# Patient Record
Sex: FEMALE | Race: WHITE | NOT HISPANIC OR LATINO | Employment: OTHER | URBAN - NONMETROPOLITAN AREA
[De-identification: names, ages, dates, MRNs, and addresses within clinical notes are randomized per-mention and may not be internally consistent; named-entity substitution may affect disease eponyms.]

---

## 2020-06-01 ENCOUNTER — HISTORICAL (OUTPATIENT)
Dept: ADMINISTRATIVE | Facility: HOSPITAL | Age: 70
End: 2020-06-01

## 2020-07-08 ENCOUNTER — HISTORICAL (OUTPATIENT)
Dept: ADMINISTRATIVE | Facility: HOSPITAL | Age: 70
End: 2020-07-08

## 2020-07-08 LAB
APPEARANCE, UA: CLEAR
BACTERIA SPEC CULT: NORMAL /HPF
BILIRUB UR QL STRIP: NORMAL MG/DL
BUDDING YEAST: NORMAL /HPF
CASTS, URINE MICROSCOPIC: 28 /LPF
COLOR UR: NORMAL
EPITHELIAL, URINE MICROSCOPIC: NEGATIVE /HPF
GLUCOSE (UA): NEGATIVE MG/DL
HGB UR QL STRIP: NEGATIVE ERY/UL
KETONES UR QL STRIP: 15 MG/DL
LEUKOCYTE ESTERASE UR QL STRIP: NORMAL LEU/UL
NITRITE UR QL STRIP: POSITIVE MG/DL
PH UR STRIP: 6 [PH] (ref 5–7.5)
PROT UR QL STRIP: 30 MG/DL
RBC #/AREA URNS HPF: NEGATIVE /HPF
SP GR UR STRIP: 1.03 (ref 1–1.03)
SPERM, URINE MICROSCOPIC: NORMAL /HPF
TYPE OF SPECIMEN  (UA): NORMAL
UNCLASSIFIED CRYSTALS, UA: NORMAL /HPF
UROBILINOGEN UR STRIP-ACNC: 1 EU/L
WBC #/AREA URNS HPF: 32 /HPF

## 2020-07-08 PROCEDURE — 87077 CULTURE AEROBIC IDENTIFY: CPT

## 2020-07-08 PROCEDURE — 87186 SC STD MICRODIL/AGAR DIL: CPT

## 2020-07-08 PROCEDURE — 99283 EMERGENCY DEPT VISIT LOW MDM: CPT

## 2020-07-08 PROCEDURE — 99990 CHARGE CONVERSION: CPT

## 2020-07-08 PROCEDURE — 81001 URINALYSIS AUTO W/SCOPE: CPT

## 2020-07-08 PROCEDURE — 87086 URINE CULTURE/COLONY COUNT: CPT

## 2020-07-10 ENCOUNTER — HISTORICAL (OUTPATIENT)
Dept: ADMINISTRATIVE | Facility: HOSPITAL | Age: 70
End: 2020-07-10

## 2020-07-10 ENCOUNTER — HOSPITAL ENCOUNTER (INPATIENT)
Facility: HOSPITAL | Age: 70
LOS: 20 days | Discharge: NURSING, REHAB OR LONG-TERM CARE | DRG: 871 | End: 2020-07-30
Attending: INTERNAL MEDICINE | Admitting: INTERNAL MEDICINE
Payer: MEDICARE

## 2020-07-10 LAB
ALBUMIN SERPL BCP-MCNC: 2.8 G/DL (ref 3.5–5)
ALBUMIN/GLOB SERPL ELPH: 0.6 {RATIO} (ref 1.5–2.2)
ALP SERPL-CCNC: 102 U/L (ref 45–117)
ALT SERPL W P-5'-P-CCNC: 28 U/L (ref 13–56)
ANION GAP SERPL CALC-SCNC: 12 MEQ/L (ref 10–20)
APPEARANCE, UA: CLEAR
AST SERPL-CCNC: 36 U/L (ref 15–37)
BACTERIA SPEC CULT: NEGATIVE /HPF
BASOPHILS NFR BLD: 0 10 (ref 0–0.1)
BASOPHILS NFR BLD: 0.1 % (ref 0–1.5)
BILIRUB SERPL-MCNC: 1.02 MG/DL (ref 0.2–1)
BILIRUB UR QL STRIP: NEGATIVE MG/DL
BNP SERPL-MCNC: 1535 PG/ML (ref 0–352)
BUDDING YEAST: ABNORMAL /HPF
BUN SERPL-MCNC: 22 MG/DL (ref 7–18)
CALCIUM SERPL-MCNC: 9.7 MG/DL (ref 8.5–10.1)
CASTS, URINE MICROSCOPIC: NEGATIVE /LPF
CHLORIDE SERPL-SCNC: 96 MMOL/L (ref 98–107)
CO2 SERPL-SCNC: 29 MMOL/L (ref 22–32)
COLOR UR: YELLOW
COVID-19 INTERNAL CONTROL: NORMAL
CREAT SERPL-MCNC: 1.25 MG/DL (ref 0.55–1.02)
D DIMER PPP IA.FEU-MCNC: 3.83 MG/L (ref 0–0.5)
EGFR: 45 ML/MIN/1.73M
EOSINOPHIL NFR BLD: 0 % (ref 0–7)
EOSINOPHIL NFR BLD: 0 10 (ref 0–0.7)
EPITHELIAL, URINE MICROSCOPIC: NEGATIVE /HPF
ERYTHROCYTE [DISTWIDTH] IN BLOOD BY AUTOMATED COUNT: 15.5 % (ref 11.5–14.5)
GLOBULIN: 4.5 G/DL (ref 2.3–3.5)
GLUCOSE (UA): NEGATIVE MG/DL
GLUCOSE SERPL-MCNC: 141 MG/DL (ref 70–99)
GRAN #: 22.07 10 (ref 2–7.5)
GRAN%: 0.9 %
GRAN%: 80 % (ref 50–80)
HCT VFR BLD AUTO: 42.8 % (ref 37.7–47.9)
HGB BLD-MCNC: 13.7 G/DL (ref 12.2–16.2)
HGB UR QL STRIP: ABNORMAL ERY/UL
IMMATURE GRANULOCYTES #: 0.26 10
KETONES UR QL STRIP: NEGATIVE MG/DL
LACTATE SERPL-SCNC: 1.56 MMOL/L (ref 0.4–2)
LACTATE SERPL-SCNC: 2.32 MMOL/L (ref 0.4–2)
LEUKOCYTE ESTERASE UR QL STRIP: NEGATIVE LEU/UL
LIPASE SERPL-CCNC: 33 U/L (ref 73–393)
LYMPH #: 1.4 10 (ref 1–3.5)
LYMPH%: 5 % (ref 12–50)
MAGNESIUM SERPL-MCNC: 1.9 MG/DL (ref 1.8–2.4)
MCH RBC QN AUTO: 29.1 PG (ref 27–31)
MCHC RBC AUTO-ENTMCNC: 32 G% (ref 32–35)
MCV RBC AUTO: 90.9 FL (ref 80–97)
MONO #: 3.9 10 (ref 0–0.8)
MONO%: 14 % (ref 0–12)
NITRITE UR QL STRIP: NEGATIVE MG/DL
OSMOC: 272 MOSM/KG (ref 275–295)
PH UR STRIP: 5 [PH] (ref 5–7.5)
PMV BLD AUTO: 235 10 (ref 142–424)
PMV BLD AUTO: 9.8 FL (ref 7.4–10.4)
POTASSIUM SERPL-SCNC: 4 MMOL/L (ref 3.5–5.1)
PROT SERPL-MCNC: 7.3 G/DL (ref 6.4–8.2)
PROT UR QL STRIP: 100 MG/DL
RBC # BLD AUTO: 4.71 M/UL (ref 4.04–5.48)
RBC #/AREA URNS HPF: NEGATIVE /HPF
SARS-COV-2 RNA RESP QL NAA+PROBE: NOT DETECTED
SODIUM BLD-SCNC: 133 MMOL/L (ref 136–145)
SP GR UR STRIP: >1.045 (ref 1–1.03)
SPERM, URINE MICROSCOPIC: ABNORMAL /HPF
TROPONIN I SERPL DL<=0.01 NG/ML-MCNC: <0.02 NG/ML (ref 0–0.05)
TYPE OF SPECIMEN  (UA): ABNORMAL
UNCLASSIFIED CRYSTALS, UA: ABNORMAL /HPF
URINE CULTURE, ROUTINE: NORMAL
UROBILINOGEN UR STRIP-ACNC: 1 EU/L
WBC # BLD AUTO: 27.6 10 (ref 4–10.2)
WBC #/AREA URNS HPF: NEGATIVE /HPF

## 2020-07-10 PROCEDURE — 81001 URINALYSIS AUTO W/SCOPE: CPT

## 2020-07-10 PROCEDURE — 80053 COMPREHEN METABOLIC PANEL: CPT

## 2020-07-10 PROCEDURE — 85025 COMPLETE CBC W/AUTO DIFF WBC: CPT

## 2020-07-10 PROCEDURE — 87077 CULTURE AEROBIC IDENTIFY: CPT

## 2020-07-10 PROCEDURE — 85379 FIBRIN DEGRADATION QUANT: CPT

## 2020-07-10 PROCEDURE — 83880 ASSAY OF NATRIURETIC PEPTIDE: CPT

## 2020-07-10 PROCEDURE — 87186 SC STD MICRODIL/AGAR DIL: CPT

## 2020-07-10 PROCEDURE — 94640 AIRWAY INHALATION TREATMENT: CPT

## 2020-07-10 PROCEDURE — 87040 BLOOD CULTURE FOR BACTERIA: CPT

## 2020-07-10 PROCEDURE — 83605 ASSAY OF LACTIC ACID: CPT

## 2020-07-10 PROCEDURE — P9612 CATHETERIZE FOR URINE SPEC: HCPCS

## 2020-07-10 PROCEDURE — 83735 ASSAY OF MAGNESIUM: CPT

## 2020-07-10 PROCEDURE — 71275 CT ANGIOGRAPHY CHEST: CPT

## 2020-07-10 PROCEDURE — 99285 EMERGENCY DEPT VISIT HI MDM: CPT | Mod: 25

## 2020-07-10 PROCEDURE — 84484 ASSAY OF TROPONIN QUANT: CPT

## 2020-07-10 PROCEDURE — 93005 ELECTROCARDIOGRAM TRACING: CPT

## 2020-07-10 PROCEDURE — 71045 X-RAY EXAM CHEST 1 VIEW: CPT

## 2020-07-10 PROCEDURE — 99990 CHARGE CONVERSION: CPT | Mod: 25

## 2020-07-10 PROCEDURE — 83690 ASSAY OF LIPASE: CPT

## 2020-07-11 LAB
ANION GAP SERPL CALC-SCNC: 7.6 MEQ/L (ref 10–20)
BASOPHILS NFR BLD: 0 10 (ref 0–0.1)
BASOPHILS NFR BLD: 0.1 % (ref 0–1.5)
BUN SERPL-MCNC: 20 MG/DL (ref 7–18)
CALCIUM SERPL-MCNC: 8.6 MG/DL (ref 8.5–10.1)
CHLORIDE SERPL-SCNC: 98 MMOL/L (ref 98–107)
CO2 SERPL-SCNC: 30 MMOL/L (ref 22–32)
CREAT SERPL-MCNC: 1.08 MG/DL (ref 0.55–1.02)
EGFR: 53 ML/MIN/1.73M
EOSINOPHIL NFR BLD: 0 % (ref 0–7)
EOSINOPHIL NFR BLD: 0 10 (ref 0–0.7)
ERYTHROCYTE [DISTWIDTH] IN BLOOD BY AUTOMATED COUNT: 15.5 % (ref 11.5–14.5)
GLUCOSE SERPL-MCNC: 136 MG/DL (ref 70–99)
GRAN #: 20.04 10 (ref 2–7.5)
GRAN%: 1.1 %
GRAN%: 80.9 % (ref 50–80)
HCT VFR BLD AUTO: 38.9 % (ref 37.7–47.9)
HGB BLD-MCNC: 12.6 G/DL (ref 12.2–16.2)
IMMATURE GRANULOCYTES #: 0.26 10
LACTATE SERPL-SCNC: 0.81 MMOL/L (ref 0.4–2)
LYMPH #: 1 10 (ref 1–3.5)
LYMPH%: 4.2 % (ref 12–50)
MAGNESIUM SERPL-MCNC: 2.37 MG/DL (ref 1.8–2.4)
MCH RBC QN AUTO: 29.3 PG (ref 27–31)
MCHC RBC AUTO-ENTMCNC: 32.4 G% (ref 32–35)
MCV RBC AUTO: 90.5 FL (ref 80–97)
MONO #: 3.4 10 (ref 0–0.8)
MONO%: 13.7 % (ref 0–12)
OSMOC: 269 MOSM/KG (ref 275–295)
PMV BLD AUTO: 10 FL (ref 7.4–10.4)
PMV BLD AUTO: 228 10 (ref 142–424)
POTASSIUM SERPL-SCNC: 3.6 MMOL/L (ref 3.5–5.1)
RBC # BLD AUTO: 4.3 M/UL (ref 4.04–5.48)
SODIUM BLD-SCNC: 132 MMOL/L (ref 136–145)
WBC # BLD AUTO: 24.8 10 (ref 4–10.2)

## 2020-07-11 PROCEDURE — 80048 BASIC METABOLIC PNL TOTAL CA: CPT

## 2020-07-11 PROCEDURE — 87205 SMEAR GRAM STAIN: CPT

## 2020-07-11 PROCEDURE — 94640 AIRWAY INHALATION TREATMENT: CPT | Mod: 76

## 2020-07-11 PROCEDURE — 83605 ASSAY OF LACTIC ACID: CPT

## 2020-07-11 PROCEDURE — 99990 CHARGE CONVERSION: CPT | Mod: 76

## 2020-07-11 PROCEDURE — 83735 ASSAY OF MAGNESIUM: CPT

## 2020-07-11 PROCEDURE — 85025 COMPLETE CBC W/AUTO DIFF WBC: CPT

## 2020-07-12 LAB
ALBUMIN SERPL BCP-MCNC: 2.3 G/DL (ref 3.5–5)
ALBUMIN/GLOB SERPL ELPH: 0.5 {RATIO} (ref 1.5–2.2)
ALP SERPL-CCNC: 142 U/L (ref 45–117)
ALT SERPL W P-5'-P-CCNC: 32 U/L (ref 13–56)
ANION GAP SERPL CALC-SCNC: 12.4 MEQ/L (ref 10–20)
AST SERPL-CCNC: 35 U/L (ref 15–37)
BASOPHILS NFR BLD: 0 10 (ref 0–0.1)
BASOPHILS NFR BLD: 0.1 % (ref 0–1.5)
BILIRUB SERPL-MCNC: 0.52 MG/DL (ref 0.2–1)
BUN SERPL-MCNC: 27 MG/DL (ref 7–18)
CALCIUM SERPL-MCNC: 9.3 MG/DL (ref 8.5–10.1)
CHLORIDE SERPL-SCNC: 91 MMOL/L (ref 98–107)
CO2 SERPL-SCNC: 28 MMOL/L (ref 22–32)
CREAT SERPL-MCNC: 1.28 MG/DL (ref 0.55–1.02)
EGFR: 44 ML/MIN/1.73M
EOSINOPHIL NFR BLD: 0 % (ref 0–7)
EOSINOPHIL NFR BLD: 0 10 (ref 0–0.7)
ERYTHROCYTE [DISTWIDTH] IN BLOOD BY AUTOMATED COUNT: 15.3 % (ref 11.5–14.5)
GLOBULIN: 4.6 G/DL (ref 2.3–3.5)
GLUCOSE SERPL-MCNC: 111 MG/DL (ref 70–99)
GRAN #: 16.77 10 (ref 2–7.5)
GRAN%: 0.7 %
GRAN%: 78.5 % (ref 50–80)
HCT VFR BLD AUTO: 35.8 % (ref 37.7–47.9)
HGB BLD-MCNC: 11.8 G/DL (ref 12.2–16.2)
IMMATURE GRANULOCYTES #: 0.15 10
LYMPH #: 1.1 10 (ref 1–3.5)
LYMPH%: 4.9 % (ref 12–50)
MAGNESIUM SERPL-MCNC: 2.35 MG/DL (ref 1.8–2.4)
MCH RBC QN AUTO: 29.6 PG (ref 27–31)
MCHC RBC AUTO-ENTMCNC: 33 G% (ref 32–35)
MCV RBC AUTO: 89.9 FL (ref 80–97)
MONO #: 3.4 10 (ref 0–0.8)
MONO%: 15.8 % (ref 0–12)
OSMOC: 263 MOSM/KG (ref 275–295)
PMV BLD AUTO: 10.6 FL (ref 7.4–10.4)
PMV BLD AUTO: 225 10 (ref 142–424)
POTASSIUM SERPL-SCNC: 3.4 MMOL/L (ref 3.5–5.1)
PROT SERPL-MCNC: 6.9 G/DL (ref 6.4–8.2)
RBC # BLD AUTO: 3.98 M/UL (ref 4.04–5.48)
SODIUM BLD-SCNC: 128 MMOL/L (ref 136–145)
WBC # BLD AUTO: 21.4 10 (ref 4–10.2)

## 2020-07-12 PROCEDURE — 85025 COMPLETE CBC W/AUTO DIFF WBC: CPT

## 2020-07-12 PROCEDURE — C1751 CATH, INF, PER/CENT/MIDLINE: HCPCS

## 2020-07-12 PROCEDURE — 83735 ASSAY OF MAGNESIUM: CPT

## 2020-07-12 PROCEDURE — 80053 COMPREHEN METABOLIC PANEL: CPT

## 2020-07-12 PROCEDURE — 94640 AIRWAY INHALATION TREATMENT: CPT | Mod: 76

## 2020-07-12 PROCEDURE — 99990 CHARGE CONVERSION: CPT

## 2020-07-13 ENCOUNTER — HISTORICAL (OUTPATIENT)
Dept: ADMINISTRATIVE | Facility: HOSPITAL | Age: 70
End: 2020-07-13

## 2020-07-13 LAB
ALBUMIN SERPL BCP-MCNC: 2.1 G/DL (ref 3.5–5)
ALBUMIN/GLOB SERPL ELPH: 0.4 {RATIO} (ref 1.5–2.2)
ALP SERPL-CCNC: 117 U/L (ref 45–117)
ALT SERPL W P-5'-P-CCNC: 42 U/L (ref 13–56)
ANION GAP SERPL CALC-SCNC: 10.3 MEQ/L (ref 10–20)
AST SERPL-CCNC: 42 U/L (ref 15–37)
BACTERIA BLD CULT: NORMAL
BASOPHILS NFR BLD: 0 10 (ref 0–0.1)
BASOPHILS NFR BLD: 0.2 % (ref 0–1.5)
BILIRUB SERPL-MCNC: 0.58 MG/DL (ref 0.2–1)
BUN SERPL-MCNC: 23 MG/DL (ref 7–18)
CALCIUM SERPL-MCNC: 8.9 MG/DL (ref 8.5–10.1)
CHLORIDE SERPL-SCNC: 90 MMOL/L (ref 98–107)
CO2 SERPL-SCNC: 29 MMOL/L (ref 22–32)
CREAT SERPL-MCNC: 1.18 MG/DL (ref 0.55–1.02)
EGFR: 48 ML/MIN/1.73M
EOSINOPHIL NFR BLD: 0 10 (ref 0–0.7)
EOSINOPHIL NFR BLD: 0.2 % (ref 0–7)
ERYTHROCYTE [DISTWIDTH] IN BLOOD BY AUTOMATED COUNT: 15.2 % (ref 11.5–14.5)
GLOBULIN: 5 G/DL (ref 2.3–3.5)
GLUCOSE SERPL-MCNC: 109 MG/DL (ref 70–99)
GRAN #: 14.77 10 (ref 2–7.5)
GRAN%: 1.1 %
GRAN%: 74.9 % (ref 50–80)
HCT VFR BLD AUTO: 37.9 % (ref 37.7–47.9)
HGB BLD-MCNC: 12.6 G/DL (ref 12.2–16.2)
IMMATURE GRANULOCYTES #: 0.22 10
LYMPH #: 1.4 10 (ref 1–3.5)
LYMPH%: 7 % (ref 12–50)
MAGNESIUM SERPL-MCNC: 2.28 MG/DL (ref 1.8–2.4)
MCH RBC QN AUTO: 29.3 PG (ref 27–31)
MCHC RBC AUTO-ENTMCNC: 33.2 G% (ref 32–35)
MCV RBC AUTO: 88.1 FL (ref 80–97)
MONO #: 3.3 10 (ref 0–0.8)
MONO%: 16.6 % (ref 0–12)
OSMOC: 258 MOSM/KG (ref 275–295)
PMV BLD AUTO: 10.5 FL (ref 7.4–10.4)
PMV BLD AUTO: 242 10 (ref 142–424)
POTASSIUM SERPL-SCNC: 3.3 MMOL/L (ref 3.5–5.1)
PROT SERPL-MCNC: 7.1 G/DL (ref 6.4–8.2)
RBC # BLD AUTO: 4.3 M/UL (ref 4.04–5.48)
SODIUM BLD-SCNC: 126 MMOL/L (ref 136–145)
VANCOMYCIN-TROUGH: 22.5 UG/ML (ref 10–20)
WBC # BLD AUTO: 19.7 10 (ref 4–10.2)

## 2020-07-13 PROCEDURE — 80202 ASSAY OF VANCOMYCIN: CPT

## 2020-07-13 PROCEDURE — 71045 X-RAY EXAM CHEST 1 VIEW: CPT

## 2020-07-13 PROCEDURE — 99990 CHARGE CONVERSION: CPT | Mod: 76

## 2020-07-13 PROCEDURE — 94640 AIRWAY INHALATION TREATMENT: CPT | Mod: 76

## 2020-07-13 PROCEDURE — 85025 COMPLETE CBC W/AUTO DIFF WBC: CPT

## 2020-07-13 PROCEDURE — 80053 COMPREHEN METABOLIC PANEL: CPT

## 2020-07-13 PROCEDURE — 93306 TTE W/DOPPLER COMPLETE: CPT

## 2020-07-13 PROCEDURE — 83735 ASSAY OF MAGNESIUM: CPT

## 2020-07-14 LAB
ALBUMIN SERPL BCP-MCNC: 2.1 G/DL (ref 3.5–5)
ALBUMIN/GLOB SERPL ELPH: 0.4 {RATIO} (ref 1.5–2.2)
ALP SERPL-CCNC: 122 U/L (ref 45–117)
ALT SERPL W P-5'-P-CCNC: 48 U/L (ref 13–56)
ANION GAP SERPL CALC-SCNC: 10.3 MEQ/L (ref 10–20)
AST SERPL-CCNC: 44 U/L (ref 15–37)
BASOPHILS NFR BLD: 0.1 10 (ref 0–0.1)
BASOPHILS NFR BLD: 0.3 % (ref 0–1.5)
BILIRUB SERPL-MCNC: 0.67 MG/DL (ref 0.2–1)
BUN SERPL-MCNC: 17 MG/DL (ref 7–18)
CALCIUM SERPL-MCNC: 9.4 MG/DL (ref 8.5–10.1)
CHLORIDE SERPL-SCNC: 88 MMOL/L (ref 98–107)
CO2 SERPL-SCNC: 29 MMOL/L (ref 22–32)
CREAT SERPL-MCNC: 1 MG/DL (ref 0.55–1.02)
EGFR: 58 ML/MIN/1.73M
EOSINOPHIL NFR BLD: 0.1 10 (ref 0–0.7)
EOSINOPHIL NFR BLD: 0.3 % (ref 0–7)
ERYTHROCYTE [DISTWIDTH] IN BLOOD BY AUTOMATED COUNT: 14.6 % (ref 11.5–14.5)
GLOBULIN: 5.2 G/DL (ref 2.3–3.5)
GLUCOSE SERPL-MCNC: 105 MG/DL (ref 70–99)
GRAN #: 12.04 10 (ref 2–7.5)
GRAN%: 3.5 %
GRAN%: 67.6 % (ref 50–80)
HCT VFR BLD AUTO: 36.2 % (ref 37.7–47.9)
HGB BLD-MCNC: 12.3 G/DL (ref 12.2–16.2)
IMMATURE GRANULOCYTES #: 0.62 10
LYMPH #: 1.9 10 (ref 1–3.5)
LYMPH%: 10.4 % (ref 12–50)
MAGNESIUM SERPL-MCNC: 2.13 MG/DL (ref 1.8–2.4)
MCH RBC QN AUTO: 29.1 PG (ref 27–31)
MCHC RBC AUTO-ENTMCNC: 34 G% (ref 32–35)
MCV RBC AUTO: 85.8 FL (ref 80–97)
MONO #: 3.2 10 (ref 0–0.8)
MONO%: 17.9 % (ref 0–12)
OSMOC: 252 MOSM/KG (ref 275–295)
PMV BLD AUTO: 10.1 FL (ref 7.4–10.4)
PMV BLD AUTO: 298 10 (ref 142–424)
POTASSIUM SERPL-SCNC: 3.3 MMOL/L (ref 3.5–5.1)
PROT SERPL-MCNC: 7.3 G/DL (ref 6.4–8.2)
RBC # BLD AUTO: 4.22 M/UL (ref 4.04–5.48)
SODIUM BLD-SCNC: 124 MMOL/L (ref 136–145)
VANCOMYCIN-TROUGH: 26.2 UG/ML (ref 10–20)
WBC # BLD AUTO: 17.8 10 (ref 4–10.2)

## 2020-07-14 PROCEDURE — 80202 ASSAY OF VANCOMYCIN: CPT

## 2020-07-14 PROCEDURE — 94640 AIRWAY INHALATION TREATMENT: CPT | Mod: 76

## 2020-07-14 PROCEDURE — 80053 COMPREHEN METABOLIC PANEL: CPT

## 2020-07-14 PROCEDURE — 99990 CHARGE CONVERSION: CPT | Mod: 76

## 2020-07-14 PROCEDURE — 85025 COMPLETE CBC W/AUTO DIFF WBC: CPT

## 2020-07-14 PROCEDURE — 83735 ASSAY OF MAGNESIUM: CPT

## 2020-07-15 ENCOUNTER — HISTORICAL (OUTPATIENT)
Dept: ADMINISTRATIVE | Facility: HOSPITAL | Age: 70
End: 2020-07-15

## 2020-07-15 LAB
ALBUMIN SERPL BCP-MCNC: 2.1 G/DL (ref 3.5–5)
ALBUMIN/GLOB SERPL ELPH: 0.4 {RATIO} (ref 1.5–2.2)
ALP SERPL-CCNC: 139 U/L (ref 45–117)
ALT SERPL W P-5'-P-CCNC: 45 U/L (ref 13–56)
ANION GAP SERPL CALC-SCNC: 10.7 MEQ/L (ref 10–20)
AST SERPL-CCNC: 42 U/L (ref 15–37)
BANDS: 6 % (ref 0–3)
BASOPHILS NFR BLD: 0 % (ref 0–3)
BASOPHILS NFR BLD: 0.1 10 (ref 0–0.1)
BASOPHILS NFR BLD: 0.5 % (ref 0–1.5)
BE (B): 2.2 MMOL/L
BILIRUB SERPL-MCNC: 0.51 MG/DL (ref 0.2–1)
BUN SERPL-MCNC: 20 MG/DL (ref 7–18)
CALCIUM SERPL-MCNC: 9 MG/DL (ref 8.5–10.1)
CHLORIDE SERPL-SCNC: 88 MMOL/L (ref 98–107)
CO2 SERPL-SCNC: 30 MMOL/L (ref 22–32)
CREAT SERPL-MCNC: 1.09 MG/DL (ref 0.55–1.02)
CTCO2: 27.4 MMOL/L (ref 22–30)
EGFR: 53 ML/MIN/1.73M
EOSINOPHIL NFR BLD: 0.1 10 (ref 0–0.7)
EOSINOPHIL NFR BLD: 0.5 % (ref 0–7)
EOSINOPHIL NFR BLD: 1 % (ref 0–5)
ERYTHROCYTE [DISTWIDTH] IN BLOOD BY AUTOMATED COUNT: 14.6 % (ref 11.5–14.5)
FIO2: 40
FLOW: 5
GLOBULIN: 5.2 G/DL (ref 2.3–3.5)
GLUCOSE SERPL-MCNC: 103 MG/DL (ref 70–99)
GRAN #: 12.26 10 (ref 2–7.5)
GRAN%: 6.8 %
GRAN%: 65 % (ref 46–66)
GRAN%: 65.8 % (ref 50–80)
HCO3-: 26.2 MMOL/L (ref 22–26)
HCT VFR BLD AUTO: 35.8 % (ref 37.7–47.9)
HGB BLD-MCNC: 12.1 G/DL (ref 12.2–16.2)
IMMATURE GRANULOCYTES #: 1.27 10
LYMPH #: 1.9 10 (ref 1–3.5)
LYMPH #: 11 % (ref 24–44)
LYMPH%: 10.1 % (ref 12–50)
MAGNESIUM SERPL-MCNC: 2.17 MG/DL (ref 1.8–2.4)
MCH RBC QN AUTO: 28.8 PG (ref 27–31)
MCHC RBC AUTO-ENTMCNC: 33.8 G% (ref 32–35)
MCV RBC AUTO: 85.2 FL (ref 80–97)
MODALLEN: ABNORMAL
MODE: ABNORMAL
MONO #: 17 % (ref 0–12)
MONO #: 3.1 10 (ref 0–0.8)
MONO%: 16.3 % (ref 0–12)
O2SAT: 92.8 % (ref 92–100)
OSMOC: 254 MOSM/KG (ref 275–295)
PCO2: 38.9 MMHG (ref 35–45)
PH: 7.45 (ref 7.35–7.45)
PMV BLD AUTO: 313 10 (ref 142–424)
PMV BLD AUTO: 9.8 FL (ref 7.4–10.4)
PO2: 63.4 (ref 80–100)
POTASSIUM SERPL-SCNC: 3.7 MMOL/L (ref 3.5–5.1)
PROT SERPL-MCNC: 7.3 G/DL (ref 6.4–8.2)
RBC # BLD AUTO: 4.2 M/UL (ref 4.04–5.48)
SAM TYPE: ABNORMAL
SITE: ABNORMAL
SODIUM BLD-SCNC: 125 MMOL/L (ref 136–145)
SOURCE: ABNORMAL
TOTALC1: 100
VANCOMYCIN, PEDIATRIC (1HR): 18.4 UG/ML
WBC # BLD AUTO: 18.7 10 (ref 4–10.2)

## 2020-07-15 PROCEDURE — 73610 X-RAY EXAM OF ANKLE: CPT

## 2020-07-15 PROCEDURE — 85025 COMPLETE CBC W/AUTO DIFF WBC: CPT

## 2020-07-15 PROCEDURE — 83735 ASSAY OF MAGNESIUM: CPT

## 2020-07-15 PROCEDURE — 80053 COMPREHEN METABOLIC PANEL: CPT

## 2020-07-15 PROCEDURE — 82803 BLOOD GASES ANY COMBINATION: CPT

## 2020-07-15 PROCEDURE — 94640 AIRWAY INHALATION TREATMENT: CPT | Mod: 76

## 2020-07-15 PROCEDURE — 80202 ASSAY OF VANCOMYCIN: CPT

## 2020-07-15 PROCEDURE — 99990 CHARGE CONVERSION: CPT | Mod: 76

## 2020-07-15 PROCEDURE — 71045 X-RAY EXAM CHEST 1 VIEW: CPT

## 2020-07-15 PROCEDURE — 36600 WITHDRAWAL OF ARTERIAL BLOOD: CPT

## 2020-07-16 LAB
ALBUMIN SERPL BCP-MCNC: 1.8 G/DL (ref 3.5–5)
ALBUMIN/GLOB SERPL ELPH: 0.4 {RATIO} (ref 1.5–2.2)
ALP SERPL-CCNC: 113 U/L (ref 45–117)
ALT SERPL W P-5'-P-CCNC: 33 U/L (ref 13–56)
ANION GAP SERPL CALC-SCNC: 12.7 MEQ/L (ref 10–20)
AST SERPL-CCNC: 26 U/L (ref 15–37)
BANDS: 17 % (ref 0–3)
BASOPHILS NFR BLD: 0 % (ref 0–3)
BASOPHILS NFR BLD: 0.1 10 (ref 0–0.1)
BASOPHILS NFR BLD: 0.6 % (ref 0–1.5)
BILIRUB SERPL-MCNC: 0.62 MG/DL (ref 0.2–1)
BUN SERPL-MCNC: 22 MG/DL (ref 7–18)
CALCIUM SERPL-MCNC: 9.4 MG/DL (ref 8.5–10.1)
CHLORIDE SERPL-SCNC: 89 MMOL/L (ref 98–107)
CO2 SERPL-SCNC: 29 MMOL/L (ref 22–32)
CREAT SERPL-MCNC: 1.27 MG/DL (ref 0.55–1.02)
EGFR: 44 ML/MIN/1.73M
EOSINOPHIL NFR BLD: 0 % (ref 0–5)
EOSINOPHIL NFR BLD: 0 10 (ref 0–0.7)
EOSINOPHIL NFR BLD: 0.2 % (ref 0–7)
ERYTHROCYTE [DISTWIDTH] IN BLOOD BY AUTOMATED COUNT: 14.6 % (ref 11.5–14.5)
GLOBULIN: 5.1 G/DL (ref 2.3–3.5)
GLUCOSE SERPL-MCNC: 107 MG/DL (ref 70–99)
GRAN #: 17.44 10 (ref 2–7.5)
GRAN%: 6.8 %
GRAN%: 68 % (ref 46–66)
GRAN%: 71.1 % (ref 50–80)
HCT VFR BLD AUTO: 34.7 % (ref 37.7–47.9)
HGB BLD-MCNC: 11.8 G/DL (ref 12.2–16.2)
IMMATURE GRANULOCYTES #: 1.67 10
LYMPH #: 2.3 10 (ref 1–3.5)
LYMPH #: 9 % (ref 24–44)
LYMPH%: 9.5 % (ref 12–50)
MAGNESIUM SERPL-MCNC: 2.2 MG/DL (ref 1.8–2.4)
MCH RBC QN AUTO: 28.9 PG (ref 27–31)
MCHC RBC AUTO-ENTMCNC: 34 G% (ref 32–35)
MCV RBC AUTO: 84.8 FL (ref 80–97)
MONO #: 2.9 10 (ref 0–0.8)
MONO #: 6 % (ref 0–12)
MONO%: 11.8 % (ref 0–12)
OSMOC: 259 MOSM/KG (ref 275–295)
PMV BLD AUTO: 363 10 (ref 142–424)
PMV BLD AUTO: 9.8 FL (ref 7.4–10.4)
POTASSIUM SERPL-SCNC: 3.7 MMOL/L (ref 3.5–5.1)
PROT SERPL-MCNC: 6.9 G/DL (ref 6.4–8.2)
RBC # BLD AUTO: 4.09 M/UL (ref 4.04–5.48)
SODIUM BLD-SCNC: 127 MMOL/L (ref 136–145)
TOTALC1: 100
VANCOMYCIN, PEDIATRIC (1HR): 9.6 UG/ML
WBC # BLD AUTO: 24.5 10 (ref 4–10.2)

## 2020-07-16 PROCEDURE — 80202 ASSAY OF VANCOMYCIN: CPT

## 2020-07-16 PROCEDURE — 85025 COMPLETE CBC W/AUTO DIFF WBC: CPT

## 2020-07-16 PROCEDURE — 80053 COMPREHEN METABOLIC PANEL: CPT

## 2020-07-16 PROCEDURE — 99990 CHARGE CONVERSION: CPT | Mod: 76

## 2020-07-16 PROCEDURE — 83735 ASSAY OF MAGNESIUM: CPT

## 2020-07-16 PROCEDURE — 94640 AIRWAY INHALATION TREATMENT: CPT | Mod: 76

## 2020-07-17 LAB
ALBUMIN SERPL BCP-MCNC: 1.7 G/DL (ref 3.5–5)
ALBUMIN/GLOB SERPL ELPH: 0.3 {RATIO} (ref 1.5–2.2)
ALP SERPL-CCNC: 121 U/L (ref 45–117)
ALT SERPL W P-5'-P-CCNC: 34 U/L (ref 13–56)
ANION GAP SERPL CALC-SCNC: 14.1 MEQ/L (ref 10–20)
AST SERPL-CCNC: 33 U/L (ref 15–37)
BANDS: 5 % (ref 0–3)
BASOPHILS NFR BLD: 0 % (ref 0–3)
BASOPHILS NFR BLD: 0 10 (ref 0–0.1)
BASOPHILS NFR BLD: 0.1 % (ref 0–1.5)
BILIRUB SERPL-MCNC: 0.58 MG/DL (ref 0.2–1)
BLASTS: 0 %
BUN SERPL-MCNC: 29 MG/DL (ref 7–18)
CALCIUM SERPL-MCNC: 9.2 MG/DL (ref 8.5–10.1)
CHLORIDE SERPL-SCNC: 88 MMOL/L (ref 98–107)
CO2 SERPL-SCNC: 26 MMOL/L (ref 22–32)
CREAT SERPL-MCNC: 1.65 MG/DL (ref 0.55–1.02)
EGFR: 33 ML/MIN/1.73M
EOSINOPHIL NFR BLD: 0 % (ref 0–5)
EOSINOPHIL NFR BLD: 0.1 10 (ref 0–0.7)
EOSINOPHIL NFR BLD: 0.2 % (ref 0–7)
ERYTHROCYTE [DISTWIDTH] IN BLOOD BY AUTOMATED COUNT: 14.7 % (ref 11.5–14.5)
GLOBULIN: 5.6 G/DL (ref 2.3–3.5)
GLUCOSE SERPL-MCNC: 94 MG/DL (ref 70–99)
GRAN #: 30.74 10 (ref 2–7.5)
GRAN%: 6.4 %
GRAN%: 77 % (ref 46–66)
GRAN%: 78 % (ref 50–80)
HCT VFR BLD AUTO: 35 % (ref 37.7–47.9)
HGB BLD-MCNC: 11.9 G/DL (ref 12.2–16.2)
IMMATURE GRANULOCYTES #: 2.53 10
LYMPH #: 0 %
LYMPH #: 2.6 10 (ref 1–3.5)
LYMPH #: 9 % (ref 24–44)
LYMPH%: 6.5 % (ref 12–50)
MAGNESIUM SERPL-MCNC: 2.37 MG/DL (ref 1.8–2.4)
MCH RBC QN AUTO: 29.1 PG (ref 27–31)
MCHC RBC AUTO-ENTMCNC: 34 G% (ref 32–35)
MCV RBC AUTO: 85.6 FL (ref 80–97)
METAMYELOCYTES # BLD MANUAL: 1 %
MONO #: 3.5 10 (ref 0–0.8)
MONO #: 7 % (ref 0–12)
MONO%: 8.8 % (ref 0–12)
MYELOCYTES: 1 % (ref 0–1)
OSMOC: 253 MOSM/KG (ref 275–295)
PMV BLD AUTO: 333 10 (ref 142–424)
PMV BLD AUTO: 9.9 FL (ref 7.4–10.4)
POTASSIUM SERPL-SCNC: 5.1 MMOL/L (ref 3.5–5.1)
PROMYELOCYTES/100 LEUKOCYTES: 0 %
PROT SERPL-MCNC: 7.3 G/DL (ref 6.4–8.2)
RBC # BLD AUTO: 4.09 M/UL (ref 4.04–5.48)
REACTIVE LYMPHOCYTES: 0 %
SODIUM BLD-SCNC: 123 MMOL/L (ref 136–145)
TOTALC1: 98
TOTALC2: 2
WBC # BLD AUTO: 39.4 10 (ref 4–10.2)

## 2020-07-17 PROCEDURE — 99990 CHARGE CONVERSION: CPT | Mod: 76

## 2020-07-17 PROCEDURE — 94640 AIRWAY INHALATION TREATMENT: CPT | Mod: 76

## 2020-07-17 PROCEDURE — 80053 COMPREHEN METABOLIC PANEL: CPT

## 2020-07-17 PROCEDURE — 85025 COMPLETE CBC W/AUTO DIFF WBC: CPT

## 2020-07-17 PROCEDURE — 83735 ASSAY OF MAGNESIUM: CPT

## 2020-07-18 LAB
ALBUMIN SERPL BCP-MCNC: 1.4 G/DL (ref 3.5–5)
ALBUMIN/GLOB SERPL ELPH: 0.3 {RATIO} (ref 1.5–2.2)
ALP SERPL-CCNC: 123 U/L (ref 45–117)
ALT SERPL W P-5'-P-CCNC: 32 U/L (ref 13–56)
ANION GAP SERPL CALC-SCNC: 14.1 MEQ/L (ref 10–20)
AST SERPL-CCNC: 34 U/L (ref 15–37)
BANDS: 24 % (ref 0–3)
BASOPHILS NFR BLD: 0 % (ref 0–3)
BASOPHILS NFR BLD: 0.19 10 (ref 0–0.1)
BASOPHILS NFR BLD: 0.4 % (ref 0–1.5)
BILIRUB SERPL-MCNC: 0.61 MG/DL (ref 0.2–1)
BUN SERPL-MCNC: 33 MG/DL (ref 7–18)
CALCIUM SERPL-MCNC: 9 MG/DL (ref 8.5–10.1)
CHLORIDE SERPL-SCNC: 84 MMOL/L (ref 98–107)
CO2 SERPL-SCNC: 28 MMOL/L (ref 22–32)
CREAT SERPL-MCNC: 1.51 MG/DL (ref 0.55–1.02)
EGFR: 36 ML/MIN/1.73M
EOSINOPHIL NFR BLD: 0 % (ref 0–5)
EOSINOPHIL NFR BLD: 0.07 10 (ref 0–0.7)
EOSINOPHIL NFR BLD: 0.2 % (ref 0–7)
ERYTHROCYTE [DISTWIDTH] IN BLOOD BY AUTOMATED COUNT: 14.6 % (ref 11.5–14.5)
GLOBULIN: 5.4 G/DL (ref 2.3–3.5)
GLUCOSE SERPL-MCNC: 83 MG/DL (ref 70–99)
GRAN #: 35.01 10 (ref 2–7.5)
GRAN%: 5.5 %
GRAN%: 62 % (ref 46–66)
GRAN%: 80.9 % (ref 50–80)
HCT VFR BLD AUTO: 30.7 % (ref 37.7–47.9)
HGB BLD-MCNC: 10.5 G/DL (ref 12.2–16.2)
IMMATURE GRANULOCYTES #: 2.36 10
LYMPH #: 2.26 10 (ref 1–3.5)
LYMPH #: 8 % (ref 24–44)
LYMPH%: 5.2 % (ref 12–50)
MCH RBC QN AUTO: 29.1 PG (ref 27–31)
MCHC RBC AUTO-ENTMCNC: 34.2 G% (ref 32–35)
MCV RBC AUTO: 85 FL (ref 80–97)
MONO #: 3.39 10 (ref 0–0.8)
MONO #: 6 % (ref 0–12)
MONO%: 7.8 % (ref 0–12)
NUCLEATED RBC/100 LEUKOCYTES: 0
OSMOC: 251 MOSM/KG (ref 275–295)
PMV BLD AUTO: 10 FL (ref 7.4–10.4)
PMV BLD AUTO: 374 10 (ref 142–424)
POTASSIUM SERPL-SCNC: 5.1 MMOL/L (ref 3.5–5.1)
PROT SERPL-MCNC: 6.8 G/DL (ref 6.4–8.2)
RBC # BLD AUTO: 3.61 M/UL (ref 4.04–5.48)
SODIUM BLD-SCNC: 121 MMOL/L (ref 136–145)
TOTALC1: 100
WBC # BLD AUTO: 44.6 10 (ref 4.6–10.2)

## 2020-07-18 PROCEDURE — 85027 COMPLETE CBC AUTOMATED: CPT

## 2020-07-18 PROCEDURE — 99990 CHARGE CONVERSION: CPT | Mod: 76

## 2020-07-18 PROCEDURE — 80053 COMPREHEN METABOLIC PANEL: CPT

## 2020-07-18 PROCEDURE — 94640 AIRWAY INHALATION TREATMENT: CPT | Mod: 76

## 2020-07-18 PROCEDURE — 85004 AUTOMATED DIFF WBC COUNT: CPT

## 2020-07-19 LAB
ALBUMIN SERPL BCP-MCNC: 1.4 G/DL (ref 3.5–5)
ALBUMIN/GLOB SERPL ELPH: 0.2 {RATIO} (ref 1.5–2.2)
ALP SERPL-CCNC: 131 U/L (ref 45–117)
ALT SERPL W P-5'-P-CCNC: 31 U/L (ref 13–56)
ANION GAP SERPL CALC-SCNC: 10.9 MEQ/L (ref 10–20)
AST SERPL-CCNC: 35 U/L (ref 15–37)
BANDS: 16 % (ref 0–3)
BASOPHILS NFR BLD: 0 % (ref 0–3)
BASOPHILS NFR BLD: 0.13 10 (ref 0–0.1)
BASOPHILS NFR BLD: 0.3 % (ref 0–1.5)
BILIRUB SERPL-MCNC: 0.63 MG/DL (ref 0.2–1)
BUN SERPL-MCNC: 27 MG/DL (ref 7–18)
CALCIUM SERPL-MCNC: 8.9 MG/DL (ref 8.5–10.1)
CHLORIDE SERPL-SCNC: 88 MMOL/L (ref 98–107)
CO2 SERPL-SCNC: 28 MMOL/L (ref 22–32)
CREAT SERPL-MCNC: 1.31 MG/DL (ref 0.55–1.02)
EGFR: 43 ML/MIN/1.73M
EOSINOPHIL NFR BLD: 0 % (ref 0–5)
EOSINOPHIL NFR BLD: 0.07 10 (ref 0–0.7)
EOSINOPHIL NFR BLD: 0.2 % (ref 0–7)
ERYTHROCYTE [DISTWIDTH] IN BLOOD BY AUTOMATED COUNT: 14.6 % (ref 11.5–14.5)
GLOBULIN: 5.7 G/DL (ref 2.3–3.5)
GLUCOSE SERPL-MCNC: 85 MG/DL (ref 70–99)
GRAN #: 32.08 10 (ref 2–7.5)
GRAN%: 5 %
GRAN%: 70 % (ref 46–66)
GRAN%: 80.8 % (ref 50–80)
HCT VFR BLD AUTO: 29 % (ref 37.7–47.9)
HGB BLD-MCNC: 10.1 G/DL (ref 12.2–16.2)
IMMATURE GRANULOCYTES #: 1.99 10
LYMPH #: 1.57 10 (ref 1–3.5)
LYMPH #: 2 % (ref 24–44)
LYMPH%: 4 % (ref 12–50)
MCH RBC QN AUTO: 29.4 PG (ref 27–31)
MCHC RBC AUTO-ENTMCNC: 34.8 G% (ref 32–35)
MCV RBC AUTO: 84.5 FL (ref 80–97)
MONO #: 12 % (ref 0–12)
MONO #: 3.83 10 (ref 0–0.8)
MONO%: 9.7 % (ref 0–12)
OSMOC: 250 MOSM/KG (ref 275–295)
PMV BLD AUTO: 396 10 (ref 142–424)
PMV BLD AUTO: 9.4 FL (ref 7.4–10.4)
POTASSIUM SERPL-SCNC: 4.9 MMOL/L (ref 3.5–5.1)
PROT SERPL-MCNC: 7.1 G/DL (ref 6.4–8.2)
RBC # BLD AUTO: 3.43 M/UL (ref 4.04–5.48)
SODIUM BLD-SCNC: 122 MMOL/L (ref 136–145)
TOTALC1: 100
VANCOMYCIN-TROUGH: 22 UG/ML (ref 10–20)
WBC # BLD AUTO: 39.6 10 (ref 4.6–10.2)

## 2020-07-19 PROCEDURE — 80053 COMPREHEN METABOLIC PANEL: CPT

## 2020-07-19 PROCEDURE — 80202 ASSAY OF VANCOMYCIN: CPT

## 2020-07-19 PROCEDURE — 94640 AIRWAY INHALATION TREATMENT: CPT | Mod: 76

## 2020-07-19 PROCEDURE — 99990 CHARGE CONVERSION: CPT | Mod: 76

## 2020-07-19 PROCEDURE — 85004 AUTOMATED DIFF WBC COUNT: CPT

## 2020-07-19 PROCEDURE — 87040 BLOOD CULTURE FOR BACTERIA: CPT

## 2020-07-19 PROCEDURE — 85027 COMPLETE CBC AUTOMATED: CPT

## 2020-07-20 LAB
ALBUMIN SERPL BCP-MCNC: 1.4 G/DL (ref 3.5–5)
ALBUMIN/GLOB SERPL ELPH: 0.2 {RATIO} (ref 1.5–2.2)
ALP SERPL-CCNC: 126 U/L (ref 45–117)
ALT SERPL W P-5'-P-CCNC: 31 U/L (ref 13–56)
ANION GAP SERPL CALC-SCNC: 11.7 MEQ/L (ref 10–20)
AST SERPL-CCNC: 34 U/L (ref 15–37)
BANDS: 9 % (ref 0–3)
BASOPHILS NFR BLD: 0 % (ref 0–3)
BASOPHILS NFR BLD: 0.1 10 (ref 0–0.1)
BASOPHILS NFR BLD: 0.4 % (ref 0–1.5)
BILIRUB SERPL-MCNC: 0.58 MG/DL (ref 0.2–1)
BUN SERPL-MCNC: 27 MG/DL (ref 7–18)
CALCIUM SERPL-MCNC: 9 MG/DL (ref 8.5–10.1)
CHLORIDE SERPL-SCNC: 89 MMOL/L (ref 98–107)
CO2 SERPL-SCNC: 28 MMOL/L (ref 22–32)
CREAT SERPL-MCNC: 1.18 MG/DL (ref 0.55–1.02)
EGFR: 48 ML/MIN/1.73M
EOSINOPHIL NFR BLD: 0 % (ref 0–5)
EOSINOPHIL NFR BLD: 0.1 10 (ref 0–0.7)
EOSINOPHIL NFR BLD: 0.4 % (ref 0–7)
ERYTHROCYTE [DISTWIDTH] IN BLOOD BY AUTOMATED COUNT: 14.6 % (ref 11.5–14.5)
GLOBULIN: 5.8 G/DL (ref 2.3–3.5)
GLUCOSE SERPL-MCNC: 82 MG/DL (ref 70–99)
GRAN #: 26.97 10 (ref 2–7.5)
GRAN%: 5 %
GRAN%: 78 % (ref 46–66)
GRAN%: 79.1 % (ref 50–80)
HCT VFR BLD AUTO: 28.3 % (ref 37.7–47.9)
HGB BLD-MCNC: 9.7 G/DL (ref 12.2–16.2)
IMMATURE GRANULOCYTES #: 1.71 10
LYMPH #: 1.5 10 (ref 1–3.5)
LYMPH #: 3 % (ref 24–44)
LYMPH%: 4.4 % (ref 12–50)
MAGNESIUM SERPL-MCNC: 2.58 MG/DL (ref 1.8–2.4)
MCH RBC QN AUTO: 29.1 PG (ref 27–31)
MCHC RBC AUTO-ENTMCNC: 34.3 G% (ref 32–35)
MCV RBC AUTO: 85 FL (ref 80–97)
MONO #: 10 % (ref 0–12)
MONO #: 3.6 10 (ref 0–0.8)
MONO%: 10.7 % (ref 0–12)
OSMOC: 254 MOSM/KG (ref 275–295)
PMV BLD AUTO: 421 10 (ref 142–424)
PMV BLD AUTO: 9.1 FL (ref 7.4–10.4)
POTASSIUM SERPL-SCNC: 4.7 MMOL/L (ref 3.5–5.1)
PROT SERPL-MCNC: 7.2 G/DL (ref 6.4–8.2)
RBC # BLD AUTO: 3.33 M/UL (ref 4.04–5.48)
SODIUM BLD-SCNC: 124 MMOL/L (ref 136–145)
TOTALC1: 100
WBC # BLD AUTO: 34.1 10 (ref 4–10.2)

## 2020-07-20 PROCEDURE — 80053 COMPREHEN METABOLIC PANEL: CPT

## 2020-07-20 PROCEDURE — 85025 COMPLETE CBC W/AUTO DIFF WBC: CPT

## 2020-07-20 PROCEDURE — 94640 AIRWAY INHALATION TREATMENT: CPT | Mod: 76

## 2020-07-20 PROCEDURE — 83735 ASSAY OF MAGNESIUM: CPT

## 2020-07-20 PROCEDURE — 99990 CHARGE CONVERSION: CPT | Mod: 76

## 2020-07-21 LAB
ALBUMIN SERPL BCP-MCNC: 1.3 G/DL (ref 3.5–5)
ALBUMIN/GLOB SERPL ELPH: 0.2 {RATIO} (ref 1.5–2.2)
ALP SERPL-CCNC: 117 U/L (ref 45–117)
ALT SERPL W P-5'-P-CCNC: 30 U/L (ref 13–56)
ANION GAP SERPL CALC-SCNC: 12.6 MEQ/L (ref 10–20)
AST SERPL-CCNC: 34 U/L (ref 15–37)
BANDS: 4 % (ref 0–3)
BASOPHILS NFR BLD: 0 % (ref 0–3)
BASOPHILS NFR BLD: 0.1 10 (ref 0–0.1)
BASOPHILS NFR BLD: 0.3 % (ref 0–1.5)
BILIRUB SERPL-MCNC: 0.51 MG/DL (ref 0.2–1)
BLASTS: 0 %
BUN SERPL-MCNC: 23 MG/DL (ref 7–18)
CALCIUM SERPL-MCNC: 9.3 MG/DL (ref 8.5–10.1)
CHLORIDE SERPL-SCNC: 95 MMOL/L (ref 98–107)
CO2 SERPL-SCNC: 26 MMOL/L (ref 22–32)
CREAT SERPL-MCNC: 1.04 MG/DL (ref 0.55–1.02)
EGFR: 56 ML/MIN/1.73M
EOSINOPHIL NFR BLD: 0 % (ref 0–5)
EOSINOPHIL NFR BLD: 0.1 10 (ref 0–0.7)
EOSINOPHIL NFR BLD: 0.3 % (ref 0–7)
ERYTHROCYTE [DISTWIDTH] IN BLOOD BY AUTOMATED COUNT: 15.1 % (ref 11.5–14.5)
GLOBULIN: 6.1 G/DL (ref 2.3–3.5)
GLUCOSE SERPL-MCNC: 83 MG/DL (ref 70–99)
GRAN #: 22.86 10 (ref 2–7.5)
GRAN%: 4.4 %
GRAN%: 77 % (ref 46–66)
GRAN%: 78.4 % (ref 50–80)
HCT VFR BLD AUTO: 29.1 % (ref 37.7–47.9)
HGB BLD-MCNC: 9.9 G/DL (ref 12.2–16.2)
IMMATURE GRANULOCYTES #: 1.27 10
IPF: 15.6
LYMPH #: 0 %
LYMPH #: 1.6 10 (ref 1–3.5)
LYMPH #: 5 % (ref 24–44)
LYMPH%: 5.6 % (ref 12–50)
MAGNESIUM SERPL-MCNC: 2.56 MG/DL (ref 1.8–2.4)
MCH RBC QN AUTO: 29.3 PG (ref 27–31)
MCHC RBC AUTO-ENTMCNC: 34 G% (ref 32–35)
MCV RBC AUTO: 86.1 FL (ref 80–97)
METAMYELOCYTES # BLD MANUAL: 2 %
MONO #: 3.2 10 (ref 0–0.8)
MONO #: 9 % (ref 0–12)
MONO%: 11 % (ref 0–12)
MYELOCYTES: 3 % (ref 0–1)
OSMOC: 262 MOSM/KG (ref 275–295)
PMV BLD AUTO: 458 10 (ref 142–424)
PMV BLD AUTO: 9.6 FL (ref 7.4–10.4)
POTASSIUM SERPL-SCNC: 4.6 MMOL/L (ref 3.5–5.1)
PROMYELOCYTES/100 LEUKOCYTES: 0 %
PROT SERPL-MCNC: 7.4 G/DL (ref 6.4–8.2)
RBC # BLD AUTO: 3.38 M/UL (ref 4.04–5.48)
REACTIVE LYMPHOCYTES: 0 %
SODIUM BLD-SCNC: 129 MMOL/L (ref 136–145)
TOTALC1: 95
TOTALC2: 5
WBC # BLD AUTO: 29.1 10 (ref 4–10.2)

## 2020-07-21 PROCEDURE — 80053 COMPREHEN METABOLIC PANEL: CPT

## 2020-07-21 PROCEDURE — 99990 CHARGE CONVERSION: CPT | Mod: 76

## 2020-07-21 PROCEDURE — 97167 OT EVAL HIGH COMPLEX 60 MIN: CPT

## 2020-07-21 PROCEDURE — 94640 AIRWAY INHALATION TREATMENT: CPT | Mod: 76

## 2020-07-21 PROCEDURE — 83735 ASSAY OF MAGNESIUM: CPT

## 2020-07-21 PROCEDURE — 85025 COMPLETE CBC W/AUTO DIFF WBC: CPT

## 2020-07-21 PROCEDURE — 97530 THERAPEUTIC ACTIVITIES: CPT

## 2020-07-21 PROCEDURE — 97161 PT EVAL LOW COMPLEX 20 MIN: CPT

## 2020-07-22 ENCOUNTER — HISTORICAL (OUTPATIENT)
Dept: ADMINISTRATIVE | Facility: HOSPITAL | Age: 70
End: 2020-07-22

## 2020-07-22 LAB
ALBUMIN SERPL BCP-MCNC: 1.4 G/DL (ref 3.5–5)
ALBUMIN/GLOB SERPL ELPH: 0.2 {RATIO} (ref 1.5–2.2)
ALP SERPL-CCNC: 122 U/L (ref 45–117)
ALT SERPL W P-5'-P-CCNC: 29 U/L (ref 13–56)
ANION GAP SERPL CALC-SCNC: 8.5 MEQ/L (ref 10–20)
AST SERPL-CCNC: 33 U/L (ref 15–37)
BASOPHILS NFR BLD: 0.1 10 (ref 0–0.1)
BASOPHILS NFR BLD: 0.3 % (ref 0–1.5)
BILIRUB SERPL-MCNC: 0.47 MG/DL (ref 0.2–1)
BUN SERPL-MCNC: 19 MG/DL (ref 7–18)
CALCIUM SERPL-MCNC: 9.6 MG/DL (ref 8.5–10.1)
CHLORIDE SERPL-SCNC: 101 MMOL/L (ref 98–107)
CO2 SERPL-SCNC: 29 MMOL/L (ref 22–32)
CREAT SERPL-MCNC: 1.01 MG/DL (ref 0.55–1.02)
EGFR: 57 ML/MIN/1.73M
EOSINOPHIL NFR BLD: 0.1 10 (ref 0–0.7)
EOSINOPHIL NFR BLD: 0.4 % (ref 0–7)
ERYTHROCYTE [DISTWIDTH] IN BLOOD BY AUTOMATED COUNT: 15.4 % (ref 11.5–14.5)
GLOBULIN: 6 G/DL (ref 2.3–3.5)
GLUCOSE SERPL-MCNC: 88 MG/DL (ref 70–99)
GRAN #: 18.23 10 (ref 2–7.5)
GRAN%: 4.1 %
GRAN%: 76.5 % (ref 50–80)
HCT VFR BLD AUTO: 28.8 % (ref 37.7–47.9)
HGB BLD-MCNC: 9.5 G/DL (ref 12.2–16.2)
IMMATURE GRANULOCYTES #: 0.98 10
LYMPH #: 1.4 10 (ref 1–3.5)
LYMPH%: 5.7 % (ref 12–50)
MAGNESIUM SERPL-MCNC: 2.62 MG/DL (ref 1.8–2.4)
MCH RBC QN AUTO: 29.1 PG (ref 27–31)
MCHC RBC AUTO-ENTMCNC: 33 G% (ref 32–35)
MCV RBC AUTO: 88.1 FL (ref 80–97)
MONO #: 3.1 10 (ref 0–0.8)
MONO%: 13 % (ref 0–12)
OSMOC: 270 MOSM/KG (ref 275–295)
PMV BLD AUTO: 501 10 (ref 142–424)
PMV BLD AUTO: 9.3 FL (ref 7.4–10.4)
POTASSIUM SERPL-SCNC: 4.5 MMOL/L (ref 3.5–5.1)
PROT SERPL-MCNC: 7.4 G/DL (ref 6.4–8.2)
RBC # BLD AUTO: 3.27 M/UL (ref 4.04–5.48)
SODIUM BLD-SCNC: 134 MMOL/L (ref 136–145)
VANCOMYCIN-TROUGH: 18.9 UG/ML (ref 10–20)
WBC # BLD AUTO: 23.8 10 (ref 4–10.2)

## 2020-07-22 PROCEDURE — 94640 AIRWAY INHALATION TREATMENT: CPT | Mod: 76

## 2020-07-22 PROCEDURE — 97110 THERAPEUTIC EXERCISES: CPT

## 2020-07-22 PROCEDURE — 83735 ASSAY OF MAGNESIUM: CPT

## 2020-07-22 PROCEDURE — 80053 COMPREHEN METABOLIC PANEL: CPT

## 2020-07-22 PROCEDURE — 99990 CHARGE CONVERSION: CPT | Mod: 76

## 2020-07-22 PROCEDURE — 80202 ASSAY OF VANCOMYCIN: CPT

## 2020-07-22 PROCEDURE — 85025 COMPLETE CBC W/AUTO DIFF WBC: CPT

## 2020-07-22 PROCEDURE — 71250 CT THORAX DX C-: CPT

## 2020-07-22 PROCEDURE — 97530 THERAPEUTIC ACTIVITIES: CPT

## 2020-07-22 PROCEDURE — 71045 X-RAY EXAM CHEST 1 VIEW: CPT

## 2020-07-23 LAB
ALBUMIN SERPL BCP-MCNC: 1.4 G/DL (ref 3.5–5)
ALBUMIN/GLOB SERPL ELPH: 0.2 {RATIO} (ref 1.5–2.2)
ALP SERPL-CCNC: 108 U/L (ref 45–117)
ALT SERPL W P-5'-P-CCNC: 29 U/L (ref 13–56)
ANION GAP SERPL CALC-SCNC: 10.7 MEQ/L (ref 10–20)
APTT PPP: 24.2 SEC (ref 23–30.4)
AST SERPL-CCNC: 29 U/L (ref 15–37)
BASOPHILS NFR BLD: 0.1 10 (ref 0–0.1)
BASOPHILS NFR BLD: 0.4 % (ref 0–1.5)
BILIRUB SERPL-MCNC: 0.44 MG/DL (ref 0.2–1)
BUN SERPL-MCNC: 17 MG/DL (ref 7–18)
CALCIUM SERPL-MCNC: 9.7 MG/DL (ref 8.5–10.1)
CHLORIDE SERPL-SCNC: 101 MMOL/L (ref 98–107)
CO2 SERPL-SCNC: 29 MMOL/L (ref 22–32)
CREAT SERPL-MCNC: 0.92 MG/DL (ref 0.55–1.02)
EGFR: 64 ML/MIN/1.73M
EOSINOPHIL NFR BLD: 0.1 10 (ref 0–0.7)
EOSINOPHIL NFR BLD: 0.6 % (ref 0–7)
ERYTHROCYTE [DISTWIDTH] IN BLOOD BY AUTOMATED COUNT: 15.6 % (ref 11.5–14.5)
GLOBULIN: 6 G/DL (ref 2.3–3.5)
GLUCOSE SERPL-MCNC: 94 MG/DL (ref 70–99)
GRAN #: 14.53 10 (ref 2–7.5)
GRAN%: 3.2 %
GRAN%: 76.9 % (ref 50–80)
HCT VFR BLD AUTO: 27.3 % (ref 37.7–47.9)
HGB BLD-MCNC: 9.1 G/DL (ref 12.2–16.2)
IMMATURE GRANULOCYTES #: 0.61 10
INR PPP: 1.1 (ref 0.9–1.2)
LYMPH #: 1.4 10 (ref 1–3.5)
LYMPH%: 7.5 % (ref 12–50)
MAGNESIUM SERPL-MCNC: 2.29 MG/DL (ref 1.8–2.4)
MCH RBC QN AUTO: 29.4 PG (ref 27–31)
MCHC RBC AUTO-ENTMCNC: 33.3 G% (ref 32–35)
MCV RBC AUTO: 88.1 FL (ref 80–97)
MONO #: 2.2 10 (ref 0–0.8)
MONO%: 11.4 % (ref 0–12)
OSMOC: 273 MOSM/KG (ref 275–295)
PMV BLD AUTO: 507 10 (ref 142–424)
PMV BLD AUTO: 9.1 FL (ref 7.4–10.4)
POTASSIUM SERPL-SCNC: 4.7 MMOL/L (ref 3.5–5.1)
PROT SERPL-MCNC: 7.4 G/DL (ref 6.4–8.2)
PROTHROMBIN TIME: 11 SEC (ref 9.8–12.4)
RBC # BLD AUTO: 3.1 M/UL (ref 4.04–5.48)
SODIUM BLD-SCNC: 136 MMOL/L (ref 136–145)
WBC # BLD AUTO: 18.9 10 (ref 4–10.2)

## 2020-07-23 PROCEDURE — 99990 CHARGE CONVERSION: CPT | Mod: 76

## 2020-07-23 PROCEDURE — 80053 COMPREHEN METABOLIC PANEL: CPT

## 2020-07-23 PROCEDURE — 85025 COMPLETE CBC W/AUTO DIFF WBC: CPT

## 2020-07-23 PROCEDURE — 85730 THROMBOPLASTIN TIME PARTIAL: CPT

## 2020-07-23 PROCEDURE — 97530 THERAPEUTIC ACTIVITIES: CPT

## 2020-07-23 PROCEDURE — 94640 AIRWAY INHALATION TREATMENT: CPT | Mod: 76

## 2020-07-23 PROCEDURE — 97110 THERAPEUTIC EXERCISES: CPT

## 2020-07-23 PROCEDURE — 97535 SELF CARE MNGMENT TRAINING: CPT

## 2020-07-23 PROCEDURE — 83735 ASSAY OF MAGNESIUM: CPT

## 2020-07-23 PROCEDURE — 85610 PROTHROMBIN TIME: CPT

## 2020-07-24 ENCOUNTER — HISTORICAL (OUTPATIENT)
Dept: ADMINISTRATIVE | Facility: HOSPITAL | Age: 70
End: 2020-07-24

## 2020-07-24 LAB
ALBUMIN SERPL BCP-MCNC: 1.5 G/DL (ref 3.5–5)
ALBUMIN/GLOB SERPL ELPH: 0.2 {RATIO} (ref 1.5–2.2)
ALP SERPL-CCNC: 107 U/L (ref 45–117)
ALT SERPL W P-5'-P-CCNC: 30 U/L (ref 13–56)
ANION GAP SERPL CALC-SCNC: 6.7 MEQ/L (ref 10–20)
AST SERPL-CCNC: 31 U/L (ref 15–37)
BASOPHILS NFR BLD: 0.1 10 (ref 0–0.1)
BASOPHILS NFR BLD: 0.3 % (ref 0–1.5)
BILIRUB SERPL-MCNC: 0.37 MG/DL (ref 0.2–1)
BUN SERPL-MCNC: 15 MG/DL (ref 7–18)
CALCIUM SERPL-MCNC: 10.5 MG/DL (ref 8.5–10.1)
CBC W AUTO DIFF BLD: NORMAL
CHLORIDE SERPL-SCNC: 102 MMOL/L (ref 98–107)
CO2 SERPL-SCNC: 31 MMOL/L (ref 22–32)
COLOR, FLUID: NORMAL
CREAT SERPL-MCNC: 1.01 MG/DL (ref 0.55–1.02)
EGFR: 57 ML/MIN/1.73M
EOSINOPHIL NFR BLD: 0.1 10 (ref 0–0.7)
EOSINOPHIL NFR BLD: 0.8 % (ref 0–7)
ERYTHROCYTE [DISTWIDTH] IN BLOOD BY AUTOMATED COUNT: 15.9 % (ref 11.5–14.5)
FLUID TYPE: NORMAL
GLOBULIN: 6.5 G/DL (ref 2.3–3.5)
GLUCOSE SERPL-MCNC: 90 MG/DL (ref 70–99)
GRAN #: 12.13 10 (ref 2–7.5)
GRAN%: 2.3 %
GRAN%: 75 % (ref 50–80)
HCT VFR BLD AUTO: 31 % (ref 37.7–47.9)
HGB BLD-MCNC: 10 G/DL (ref 12.2–16.2)
IMMATURE GRANULOCYTES #: 0.38 10
LYMPH #: 1.7 10 (ref 1–3.5)
LYMPH%: 10.8 % (ref 12–50)
Lab: 14 %
Lab: 390 CELLS/MM3
Lab: 86 %
Lab: NORMAL CELLS/MM3
MAGNESIUM SERPL-MCNC: 2.39 MG/DL (ref 1.8–2.4)
MCH RBC QN AUTO: 29 PG (ref 27–31)
MCHC RBC AUTO-ENTMCNC: 32.3 G% (ref 32–35)
MCV RBC AUTO: 89.9 FL (ref 80–97)
MONO #: 1.8 10 (ref 0–0.8)
MONO%: 10.8 % (ref 0–12)
OSMOC: 271 MOSM/KG (ref 275–295)
PMV BLD AUTO: 528 10 (ref 142–424)
PMV BLD AUTO: 9.3 FL (ref 7.4–10.4)
POTASSIUM SERPL-SCNC: 4.7 MMOL/L (ref 3.5–5.1)
PROT SERPL-MCNC: 8 G/DL (ref 6.4–8.2)
RBC # BLD AUTO: 3.45 M/UL (ref 4.04–5.48)
SODIUM BLD-SCNC: 135 MMOL/L (ref 136–145)
WBC # BLD AUTO: 16.2 10 (ref 4–10.2)

## 2020-07-24 PROCEDURE — 83986 ASSAY PH BODY FLUID NOS: CPT

## 2020-07-24 PROCEDURE — 99001 SPECIMEN HANDLING PT-LAB: CPT

## 2020-07-24 PROCEDURE — 85025 COMPLETE CBC W/AUTO DIFF WBC: CPT

## 2020-07-24 PROCEDURE — 99990 CHARGE CONVERSION: CPT | Mod: 76

## 2020-07-24 PROCEDURE — 89051 BODY FLUID CELL COUNT: CPT

## 2020-07-24 PROCEDURE — 87015 SPECIMEN INFECT AGNT CONCNTJ: CPT

## 2020-07-24 PROCEDURE — 80053 COMPREHEN METABOLIC PANEL: CPT

## 2020-07-24 PROCEDURE — 84157 ASSAY OF PROTEIN OTHER: CPT

## 2020-07-24 PROCEDURE — 87205 SMEAR GRAM STAIN: CPT

## 2020-07-24 PROCEDURE — 83615 LACTATE (LD) (LDH) ENZYME: CPT

## 2020-07-24 PROCEDURE — 77012 CT SCAN FOR NEEDLE BIOPSY: CPT

## 2020-07-24 PROCEDURE — 87070 CULTURE OTHR SPECIMN AEROBIC: CPT

## 2020-07-24 PROCEDURE — 94640 AIRWAY INHALATION TREATMENT: CPT | Mod: 76

## 2020-07-24 PROCEDURE — 82945 GLUCOSE OTHER FLUID: CPT

## 2020-07-24 PROCEDURE — 83735 ASSAY OF MAGNESIUM: CPT

## 2020-07-25 DIAGNOSIS — S82.61XG: Primary | ICD-10-CM

## 2020-07-25 DIAGNOSIS — A41.9 SEVERE SEPSIS: ICD-10-CM

## 2020-07-25 DIAGNOSIS — J18.9 COMMUNITY ACQUIRED PNEUMONIA: ICD-10-CM

## 2020-07-25 DIAGNOSIS — G72.9 MYOPATHY: ICD-10-CM

## 2020-07-25 DIAGNOSIS — R65.20 SEVERE SEPSIS: ICD-10-CM

## 2020-07-25 LAB
ALBUMIN SERPL BCP-MCNC: 1.5 G/DL (ref 3.5–5)
ALBUMIN/GLOB SERPL ELPH: 0.3 {RATIO} (ref 1.5–2.2)
ALP SERPL-CCNC: 100 U/L (ref 45–117)
ALT SERPL W P-5'-P-CCNC: 31 U/L (ref 13–56)
ANION GAP SERPL CALC-SCNC: 7 MEQ/L (ref 10–20)
AST SERPL-CCNC: 32 U/L (ref 15–37)
BASOPHILS NFR BLD: 0 10 (ref 0–0.1)
BASOPHILS NFR BLD: 0.2 % (ref 0–1.5)
BILIRUB SERPL-MCNC: 0.27 MG/DL (ref 0.2–1)
BUN SERPL-MCNC: 15 MG/DL (ref 7–18)
CALCIUM SERPL-MCNC: 10 MG/DL (ref 8.5–10.1)
CHLORIDE SERPL-SCNC: 103 MMOL/L (ref 98–107)
CO2 SERPL-SCNC: 33 MMOL/L (ref 22–32)
CREAT SERPL-MCNC: 0.96 MG/DL (ref 0.55–1.02)
EGFR: 61 ML/MIN/1.73M
EOSINOPHIL NFR BLD: 0.1 10 (ref 0–0.7)
EOSINOPHIL NFR BLD: 1.1 % (ref 0–7)
ERYTHROCYTE [DISTWIDTH] IN BLOOD BY AUTOMATED COUNT: 16 % (ref 11.5–14.5)
GLOBULIN: 6 G/DL (ref 2.3–3.5)
GLUCOSE SERPL-MCNC: 106 MG/DL (ref 70–99)
GLUCOSE, BODY FLUID: 10 MG/DL
GRAN #: 9.39 10 (ref 2–7.5)
GRAN%: 1.6 %
GRAN%: 76.1 % (ref 50–80)
HCT VFR BLD AUTO: 29.8 % (ref 37.7–47.9)
HGB BLD-MCNC: 9.5 G/DL (ref 12.2–16.2)
IMMATURE GRANULOCYTES #: 0.2 10
LDH, BODY FLUID: 1854 IU/L
LYMPH #: 1.2 10 (ref 1–3.5)
LYMPH%: 9.6 % (ref 12–50)
MAGNESIUM SERPL-MCNC: 2.2 MG/DL (ref 1.8–2.4)
MCH RBC QN AUTO: 29 PG (ref 27–31)
MCHC RBC AUTO-ENTMCNC: 31.9 G% (ref 32–35)
MCV RBC AUTO: 90.9 FL (ref 80–97)
MONO #: 1.4 10 (ref 0–0.8)
MONO%: 11.4 % (ref 0–12)
OSMOC: 277 MOSM/KG (ref 275–295)
PMV BLD AUTO: 497 10 (ref 142–424)
PMV BLD AUTO: 8.9 FL (ref 7.4–10.4)
POTASSIUM SERPL-SCNC: 5 MMOL/L (ref 3.5–5.1)
PROT SERPL-MCNC: 7.5 G/DL (ref 6.4–8.2)
RBC # BLD AUTO: 3.28 M/UL (ref 4.04–5.48)
SODIUM BLD-SCNC: 138 MMOL/L (ref 136–145)
TOTAL PROTEIN, BODY FLUID: 4.7 G/DL
WBC # BLD AUTO: 12.4 10 (ref 4–10.2)

## 2020-07-25 PROCEDURE — 85025 COMPLETE CBC W/AUTO DIFF WBC: CPT

## 2020-07-25 PROCEDURE — 97530 THERAPEUTIC ACTIVITIES: CPT

## 2020-07-25 PROCEDURE — 83735 ASSAY OF MAGNESIUM: CPT

## 2020-07-25 PROCEDURE — 80053 COMPREHEN METABOLIC PANEL: CPT

## 2020-07-25 PROCEDURE — 97110 THERAPEUTIC EXERCISES: CPT

## 2020-07-25 PROCEDURE — 99990 CHARGE CONVERSION: CPT | Mod: 76

## 2020-07-25 PROCEDURE — 94640 AIRWAY INHALATION TREATMENT: CPT | Mod: 76

## 2020-07-25 RX ORDER — ISOSORBIDE DINITRATE 20 MG/1
20 TABLET ORAL DAILY
Status: ON HOLD | COMMUNITY
End: 2020-07-30 | Stop reason: HOSPADM

## 2020-07-25 RX ORDER — METOPROLOL SUCCINATE 50 MG/1
50 TABLET, EXTENDED RELEASE ORAL DAILY
Status: ON HOLD | COMMUNITY
End: 2020-12-17 | Stop reason: HOSPADM

## 2020-07-25 RX ORDER — PRAVASTATIN SODIUM 40 MG/1
40 TABLET ORAL NIGHTLY
Status: DISCONTINUED | OUTPATIENT
Start: 2020-07-25 | End: 2020-07-30 | Stop reason: HOSPADM

## 2020-07-25 RX ORDER — METOPROLOL SUCCINATE 25 MG/1
25 TABLET, EXTENDED RELEASE ORAL DAILY
Status: ON HOLD | COMMUNITY
End: 2020-07-30 | Stop reason: HOSPADM

## 2020-07-25 RX ORDER — LISINOPRIL 40 MG/1
40 TABLET ORAL DAILY
Status: ON HOLD | COMMUNITY
End: 2020-07-30 | Stop reason: HOSPADM

## 2020-07-25 RX ORDER — ACETAMINOPHEN 325 MG/1
650 TABLET ORAL EVERY 6 HOURS PRN
Status: DISCONTINUED | OUTPATIENT
Start: 2020-07-25 | End: 2020-07-30 | Stop reason: HOSPADM

## 2020-07-25 RX ORDER — BACLOFEN 20 MG/1
20 TABLET ORAL 2 TIMES DAILY
Status: ON HOLD | COMMUNITY
End: 2020-07-30 | Stop reason: HOSPADM

## 2020-07-25 RX ORDER — ROPINIROLE 1 MG/1
1 TABLET, FILM COATED ORAL NIGHTLY
Status: ON HOLD | COMMUNITY
End: 2020-07-30 | Stop reason: HOSPADM

## 2020-07-25 RX ORDER — AMLODIPINE BESYLATE 10 MG/1
10 TABLET ORAL DAILY
Status: ON HOLD | COMMUNITY
End: 2020-07-30 | Stop reason: HOSPADM

## 2020-07-25 RX ORDER — LINEZOLID 2 MG/ML
600 INJECTION, SOLUTION INTRAVENOUS
Status: DISCONTINUED | OUTPATIENT
Start: 2020-07-26 | End: 2020-07-30 | Stop reason: HOSPADM

## 2020-07-25 RX ORDER — IPRATROPIUM BROMIDE AND ALBUTEROL SULFATE 2.5; .5 MG/3ML; MG/3ML
3 SOLUTION RESPIRATORY (INHALATION)
Status: DISCONTINUED | OUTPATIENT
Start: 2020-07-25 | End: 2020-07-30 | Stop reason: HOSPADM

## 2020-07-25 RX ORDER — MEMANTINE HYDROCHLORIDE 28 MG/1
28 CAPSULE, EXTENDED RELEASE ORAL DAILY
Status: ON HOLD | COMMUNITY
End: 2020-12-01 | Stop reason: HOSPADM

## 2020-07-25 RX ORDER — SODIUM CHLORIDE 0.9 % (FLUSH) 0.9 %
10 SYRINGE (ML) INJECTION
Status: DISCONTINUED | OUTPATIENT
Start: 2020-07-25 | End: 2020-07-30 | Stop reason: HOSPADM

## 2020-07-25 RX ORDER — DONEPEZIL HYDROCHLORIDE 10 MG/1
10 TABLET, FILM COATED ORAL NIGHTLY
COMMUNITY
End: 2022-05-12 | Stop reason: SDUPTHER

## 2020-07-25 RX ORDER — GLUCOSAMINE/CHONDR SU A SOD 167-133 MG
500 CAPSULE ORAL NIGHTLY
Status: ON HOLD | COMMUNITY
End: 2020-07-30 | Stop reason: HOSPADM

## 2020-07-25 RX ORDER — FERROUS SULFATE 324(65)MG
325 TABLET, DELAYED RELEASE (ENTERIC COATED) ORAL NIGHTLY
COMMUNITY
End: 2022-05-12 | Stop reason: SDUPTHER

## 2020-07-25 RX ORDER — PANTOPRAZOLE SODIUM 40 MG/1
40 TABLET, DELAYED RELEASE ORAL
Status: DISCONTINUED | OUTPATIENT
Start: 2020-07-26 | End: 2020-07-30 | Stop reason: HOSPADM

## 2020-07-25 RX ORDER — BACLOFEN 10 MG/1
10 TABLET ORAL 2 TIMES DAILY
Status: ON HOLD | COMMUNITY
End: 2020-07-30 | Stop reason: HOSPADM

## 2020-07-25 RX ORDER — ASPIRIN 81 MG/1
81 TABLET ORAL DAILY
Status: DISCONTINUED | OUTPATIENT
Start: 2020-07-26 | End: 2020-07-30 | Stop reason: HOSPADM

## 2020-07-25 RX ORDER — IPRATROPIUM BROMIDE AND ALBUTEROL SULFATE 2.5; .5 MG/3ML; MG/3ML
3 SOLUTION RESPIRATORY (INHALATION)
Status: DISCONTINUED | OUTPATIENT
Start: 2020-07-26 | End: 2020-07-25

## 2020-07-25 RX ORDER — METOPROLOL SUCCINATE 50 MG/1
50 TABLET, EXTENDED RELEASE ORAL DAILY
Status: DISCONTINUED | OUTPATIENT
Start: 2020-07-26 | End: 2020-07-30 | Stop reason: HOSPADM

## 2020-07-25 RX ORDER — LISINOPRIL 20 MG/1
20 TABLET ORAL DAILY
Status: DISCONTINUED | OUTPATIENT
Start: 2020-07-25 | End: 2020-07-30 | Stop reason: HOSPADM

## 2020-07-25 RX ORDER — ENOXAPARIN SODIUM 100 MG/ML
40 INJECTION SUBCUTANEOUS EVERY 24 HOURS
Status: DISCONTINUED | OUTPATIENT
Start: 2020-07-25 | End: 2020-07-30 | Stop reason: HOSPADM

## 2020-07-25 RX ORDER — BACLOFEN 20 MG/1
20 TABLET ORAL DAILY
Status: ON HOLD | COMMUNITY
End: 2020-07-30 | Stop reason: HOSPADM

## 2020-07-25 RX ORDER — ISOSORBIDE MONONITRATE 30 MG/1
60 TABLET, EXTENDED RELEASE ORAL DAILY
Status: DISCONTINUED | OUTPATIENT
Start: 2020-07-26 | End: 2020-07-30 | Stop reason: HOSPADM

## 2020-07-25 RX ORDER — DULOXETIN HYDROCHLORIDE 60 MG/1
60 CAPSULE, DELAYED RELEASE ORAL DAILY
COMMUNITY
End: 2022-05-12 | Stop reason: SDUPTHER

## 2020-07-25 RX ORDER — ISOSORBIDE MONONITRATE 60 MG/1
60 TABLET, EXTENDED RELEASE ORAL DAILY
COMMUNITY
End: 2022-04-14 | Stop reason: SDUPTHER

## 2020-07-25 RX ORDER — GABAPENTIN 600 MG/1
600 TABLET ORAL 3 TIMES DAILY
Status: ON HOLD | COMMUNITY
End: 2020-07-30 | Stop reason: HOSPADM

## 2020-07-25 RX ORDER — HYDROCODONE BITARTRATE AND ACETAMINOPHEN 10; 325 MG/1; MG/1
1 TABLET ORAL EVERY 4 HOURS PRN
Status: DISCONTINUED | OUTPATIENT
Start: 2020-07-25 | End: 2020-07-30 | Stop reason: HOSPADM

## 2020-07-25 RX ORDER — SUCRALFATE 1 G/1
1 TABLET ORAL 2 TIMES DAILY
Status: ON HOLD | COMMUNITY
End: 2020-07-30 | Stop reason: HOSPADM

## 2020-07-25 RX ORDER — SPIRONOLACTONE 25 MG/1
25 TABLET ORAL DAILY
Status: ON HOLD | COMMUNITY
End: 2020-07-30 | Stop reason: HOSPADM

## 2020-07-25 RX ORDER — HYDROXYZINE HYDROCHLORIDE 50 MG/1
50 TABLET, FILM COATED ORAL NIGHTLY
Status: ON HOLD | COMMUNITY
End: 2020-07-30 | Stop reason: HOSPADM

## 2020-07-25 RX ORDER — ASPIRIN 81 MG/1
81 TABLET ORAL DAILY
COMMUNITY

## 2020-07-25 RX ORDER — FAMOTIDINE 20 MG/1
20 TABLET, FILM COATED ORAL DAILY
Status: ON HOLD | COMMUNITY
End: 2020-07-30 | Stop reason: HOSPADM

## 2020-07-25 RX ORDER — PRAVASTATIN SODIUM 40 MG/1
40 TABLET ORAL NIGHTLY
COMMUNITY
End: 2022-05-12 | Stop reason: SDUPTHER

## 2020-07-26 PROBLEM — F03.90 DEMENTIA WITHOUT BEHAVIORAL DISTURBANCE: Status: ACTIVE | Noted: 2020-07-26

## 2020-07-26 PROBLEM — J18.9 COMMUNITY ACQUIRED PNEUMONIA: Status: ACTIVE | Noted: 2020-07-26

## 2020-07-26 PROBLEM — R78.81 MRSA BACTEREMIA: Status: ACTIVE | Noted: 2020-07-26

## 2020-07-26 PROBLEM — Z66 DO NOT RESUSCITATE: Status: ACTIVE | Noted: 2020-07-26

## 2020-07-26 PROBLEM — B95.62 MRSA BACTEREMIA: Status: ACTIVE | Noted: 2020-07-26

## 2020-07-26 PROBLEM — S82.61XG: Status: ACTIVE | Noted: 2020-07-26

## 2020-07-26 PROBLEM — J90 PLEURAL EFFUSION: Status: ACTIVE | Noted: 2020-07-26

## 2020-07-26 LAB
ALBUMIN SERPL BCP-MCNC: 1.5 G/DL (ref 3.5–5.2)
ALP SERPL-CCNC: 94 U/L (ref 55–135)
ALT SERPL W/O P-5'-P-CCNC: 30 U/L (ref 10–44)
ANION GAP SERPL CALC-SCNC: 3 MMOL/L (ref 8–16)
AST SERPL-CCNC: 29 U/L (ref 10–40)
BASOPHILS # BLD AUTO: 0.04 K/UL (ref 0–0.2)
BASOPHILS NFR BLD: 0.3 % (ref 0–1.9)
BILIRUB SERPL-MCNC: 0.3 MG/DL (ref 0.1–1)
BUN SERPL-MCNC: 14 MG/DL (ref 8–23)
CALCIUM SERPL-MCNC: 10 MG/DL (ref 8.7–10.5)
CHLORIDE SERPL-SCNC: 102 MMOL/L (ref 95–110)
CO2 SERPL-SCNC: 33 MMOL/L (ref 23–29)
CREAT SERPL-MCNC: 0.9 MG/DL (ref 0.5–1.4)
DIFFERENTIAL METHOD: ABNORMAL
EOSINOPHIL # BLD AUTO: 0.1 K/UL (ref 0–0.5)
EOSINOPHIL NFR BLD: 0.8 % (ref 0–8)
ERYTHROCYTE [DISTWIDTH] IN BLOOD BY AUTOMATED COUNT: 15.8 % (ref 11.5–14.5)
EST. GFR  (AFRICAN AMERICAN): >60 ML/MIN/1.73 M^2
EST. GFR  (NON AFRICAN AMERICAN): >60 ML/MIN/1.73 M^2
GLUCOSE SERPL-MCNC: 115 MG/DL (ref 70–110)
HCT VFR BLD AUTO: 29.7 % (ref 37–48.5)
HGB BLD-MCNC: 9.4 G/DL (ref 12–16)
IMM GRANULOCYTES # BLD AUTO: 0.1 K/UL (ref 0–0.04)
IMM GRANULOCYTES NFR BLD AUTO: 0.8 % (ref 0–0.5)
LYMPHOCYTES # BLD AUTO: 1.3 K/UL (ref 1–4.8)
LYMPHOCYTES NFR BLD: 9.4 % (ref 18–48)
MAGNESIUM SERPL-MCNC: 2.2 MG/DL (ref 1.6–2.6)
MCH RBC QN AUTO: 28.9 PG (ref 27–31)
MCHC RBC AUTO-ENTMCNC: 31.6 G/DL (ref 32–36)
MCV RBC AUTO: 91 FL (ref 82–98)
MONOCYTES # BLD AUTO: 1.2 K/UL (ref 0.3–1)
MONOCYTES NFR BLD: 8.7 % (ref 4–15)
NEUTROPHILS # BLD AUTO: 10.6 K/UL (ref 1.8–7.7)
NEUTROPHILS NFR BLD: 80 % (ref 38–73)
NRBC BLD-RTO: 0 /100 WBC
PLATELET # BLD AUTO: 491 K/UL (ref 150–350)
PMV BLD AUTO: 8.8 FL (ref 9.2–12.9)
POTASSIUM SERPL-SCNC: 4.9 MMOL/L (ref 3.5–5.1)
PROT SERPL-MCNC: 7.5 G/DL (ref 6–8.4)
RBC # BLD AUTO: 3.25 M/UL (ref 4–5.4)
SODIUM SERPL-SCNC: 138 MMOL/L (ref 136–145)
WBC # BLD AUTO: 13.25 K/UL (ref 3.9–12.7)

## 2020-07-26 PROCEDURE — 85025 COMPLETE CBC W/AUTO DIFF WBC: CPT

## 2020-07-26 PROCEDURE — 99900035 HC TECH TIME PER 15 MIN (STAT)

## 2020-07-26 PROCEDURE — 36415 COLL VENOUS BLD VENIPUNCTURE: CPT

## 2020-07-26 PROCEDURE — 94640 AIRWAY INHALATION TREATMENT: CPT

## 2020-07-26 PROCEDURE — 12000002 HC ACUTE/MED SURGE SEMI-PRIVATE ROOM

## 2020-07-26 PROCEDURE — 25000003 PHARM REV CODE 250

## 2020-07-26 PROCEDURE — 80053 COMPREHEN METABOLIC PANEL: CPT

## 2020-07-26 PROCEDURE — 94761 N-INVAS EAR/PLS OXIMETRY MLT: CPT

## 2020-07-26 PROCEDURE — 63600175 PHARM REV CODE 636 W HCPCS

## 2020-07-26 PROCEDURE — 25000242 PHARM REV CODE 250 ALT 637 W/ HCPCS

## 2020-07-26 PROCEDURE — 27000221 HC OXYGEN, UP TO 24 HOURS

## 2020-07-26 PROCEDURE — 83735 ASSAY OF MAGNESIUM: CPT

## 2020-07-26 PROCEDURE — 99900031 HC PATIENT EDUCATION (STAT)

## 2020-07-26 RX ADMIN — IPRATROPIUM BROMIDE AND ALBUTEROL SULFATE 3 ML: .5; 3 SOLUTION RESPIRATORY (INHALATION) at 01:07

## 2020-07-26 RX ADMIN — ISOSORBIDE MONONITRATE 60 MG: 30 TABLET, EXTENDED RELEASE ORAL at 08:07

## 2020-07-26 RX ADMIN — LISINOPRIL 20 MG: 20 TABLET ORAL at 08:07

## 2020-07-26 RX ADMIN — HYDROCODONE BITARTRATE AND ACETAMINOPHEN 1 TABLET: 10; 325 TABLET ORAL at 05:07

## 2020-07-26 RX ADMIN — LINEZOLID 600 MG: 600 INJECTION, SOLUTION INTRAVENOUS at 08:07

## 2020-07-26 RX ADMIN — ENOXAPARIN SODIUM 40 MG: 40 INJECTION SUBCUTANEOUS at 05:07

## 2020-07-26 RX ADMIN — IPRATROPIUM BROMIDE AND ALBUTEROL SULFATE 3 ML: .5; 3 SOLUTION RESPIRATORY (INHALATION) at 09:07

## 2020-07-26 RX ADMIN — IPRATROPIUM BROMIDE AND ALBUTEROL SULFATE 3 ML: .5; 3 SOLUTION RESPIRATORY (INHALATION) at 08:07

## 2020-07-26 RX ADMIN — METOPROLOL SUCCINATE 50 MG: 50 TABLET, EXTENDED RELEASE ORAL at 08:07

## 2020-07-26 RX ADMIN — LINEZOLID 600 MG: 600 INJECTION, SOLUTION INTRAVENOUS at 09:07

## 2020-07-26 RX ADMIN — PRAVASTATIN SODIUM 40 MG: 40 TABLET ORAL at 09:07

## 2020-07-26 RX ADMIN — PANTOPRAZOLE SODIUM 40 MG: 40 TABLET, DELAYED RELEASE ORAL at 06:07

## 2020-07-26 RX ADMIN — ASPIRIN 81 MG: 81 TABLET, COATED ORAL at 08:07

## 2020-07-26 NOTE — HPI
The patient is a 69-year-old female who presented to our facility with severe sepsis secondary to MRSA bacteremia.

## 2020-07-26 NOTE — HOSPITAL COURSE
Patient progressed fairly well throughout her hospital course thus far.  This was complicated by community-acquired pneumonia with loculated pleural effusion.  She required CT-guided paracentesis.    7/27/20  -  today the patient is without any new complaints.  I am unsure the volume of fluid that was removed but we need repeat imaging studies and to discuss with General surgery whether or not further surgical into in intervention is needed for her pleural effusion.    7/28/20 LF discussed with nurse pleural drain was dislodged on yesterday, General surgery to address today  7/29/20  family at bedside discussed condition and discharge plans. Patient and daughter both agree that she needs SNF. Have decided on [facility]. Patient not a candidate for inpatient rehab but due to lack of mobility at home patient on a normal basis uses a [employer]  7/30/20  patient is stable able to wean oxygen from VM to 2 liters N/C, COVID test was negative, patient was approved for SNF will d/c to [facility] today

## 2020-07-26 NOTE — SUBJECTIVE & OBJECTIVE
Interval History:  No acute events overnight.    Review of Systems  Objective:     Vital Signs (Most Recent):  Temp: 98.5 °F (36.9 °C) (07/26/20 1605)  Pulse: 67 (07/26/20 1605)  Resp: (!) 22 (07/26/20 1735)  BP: 138/61 (07/26/20 1605)  SpO2: (!) 91 % (07/26/20 1605) Vital Signs (24h Range):  Temp:  [97.9 °F (36.6 °C)-98.6 °F (37 °C)] 98.5 °F (36.9 °C)  Pulse:  [62-71] 67  Resp:  [18-22] 22  SpO2:  [91 %-96 %] 91 %  BP: (125-166)/(61-70) 138/61     Weight: (!) 152.4 kg (335 lb 15.7 oz)  Body mass index is 48.21 kg/m².    Intake/Output Summary (Last 24 hours) at 7/26/2020 1858  Last data filed at 7/26/2020 0600  Gross per 24 hour   Intake 1319 ml   Output 3400 ml   Net -2081 ml      Physical Exam  Vitals signs and nursing note reviewed.   Constitutional:       Appearance: She is obese.      Comments: Chronically ill-appearing.   HENT:      Head: Normocephalic and atraumatic.      Nose: Nose normal.   Eyes:      Extraocular Movements: Extraocular movements intact.      Pupils: Pupils are equal, round, and reactive to light.   Neck:      Musculoskeletal: Normal range of motion and neck supple.   Cardiovascular:      Rate and Rhythm: Normal rate and regular rhythm.      Heart sounds: No murmur. No friction rub. No gallop.    Pulmonary:      Effort: Pulmonary effort is normal.      Breath sounds: Rales present.      Comments: Decreased breath sounds at the bases  Abdominal:      General: Abdomen is flat. Bowel sounds are normal.      Palpations: Abdomen is soft.   Musculoskeletal:         General: No swelling or deformity.      Comments: Trace edema bilateral lower extremities.  Diffuse weakness noted.  Right lower extremity currently in splint.   Skin:     General: Skin is warm and dry.      Capillary Refill: Capillary refill takes less than 2 seconds.   Neurological:      General: No focal deficit present.      Mental Status: She is alert.      Motor: Weakness present.      Gait: Gait abnormal.   Psychiatric:          Mood and Affect: Mood normal.      Comments: Dimension noted         Significant Labs: All pertinent labs within the past 24 hours have been reviewed.    Significant Imaging: I have reviewed and interpreted all pertinent imaging results/findings within the past 24 hours.

## 2020-07-27 LAB
ALBUMIN SERPL BCP-MCNC: 1.5 G/DL (ref 3.5–5.2)
ALP SERPL-CCNC: 90 U/L (ref 55–135)
ALT SERPL W/O P-5'-P-CCNC: 28 U/L (ref 10–44)
ANION GAP SERPL CALC-SCNC: 3 MMOL/L (ref 8–16)
AST SERPL-CCNC: 26 U/L (ref 10–40)
BASOPHILS # BLD AUTO: 0.03 K/UL (ref 0–0.2)
BASOPHILS NFR BLD: 0.3 % (ref 0–1.9)
BILIRUB SERPL-MCNC: 0.2 MG/DL (ref 0.1–1)
BUN SERPL-MCNC: 15 MG/DL (ref 8–23)
CALCIUM SERPL-MCNC: 9.9 MG/DL (ref 8.7–10.5)
CHLORIDE SERPL-SCNC: 102 MMOL/L (ref 95–110)
CO2 SERPL-SCNC: 33 MMOL/L (ref 23–29)
CREAT SERPL-MCNC: 1 MG/DL (ref 0.5–1.4)
DIFFERENTIAL METHOD: ABNORMAL
EOSINOPHIL # BLD AUTO: 0.1 K/UL (ref 0–0.5)
EOSINOPHIL NFR BLD: 1.1 % (ref 0–8)
ERYTHROCYTE [DISTWIDTH] IN BLOOD BY AUTOMATED COUNT: 15.9 % (ref 11.5–14.5)
EST. GFR  (AFRICAN AMERICAN): >60 ML/MIN/1.73 M^2
EST. GFR  (NON AFRICAN AMERICAN): 56.8 ML/MIN/1.73 M^2
GLUCOSE SERPL-MCNC: 116 MG/DL (ref 70–110)
HCT VFR BLD AUTO: 28.9 % (ref 37–48.5)
HGB BLD-MCNC: 9.3 G/DL (ref 12–16)
IMM GRANULOCYTES # BLD AUTO: 0.09 K/UL (ref 0–0.04)
IMM GRANULOCYTES NFR BLD AUTO: 0.8 % (ref 0–0.5)
LYMPHOCYTES # BLD AUTO: 1.2 K/UL (ref 1–4.8)
LYMPHOCYTES NFR BLD: 10.7 % (ref 18–48)
MAGNESIUM SERPL-MCNC: 2.1 MG/DL (ref 1.6–2.6)
MCH RBC QN AUTO: 29.2 PG (ref 27–31)
MCHC RBC AUTO-ENTMCNC: 32.2 G/DL (ref 32–36)
MCV RBC AUTO: 91 FL (ref 82–98)
MONOCYTES # BLD AUTO: 1.1 K/UL (ref 0.3–1)
MONOCYTES NFR BLD: 9.4 % (ref 4–15)
NEUTROPHILS # BLD AUTO: 8.9 K/UL (ref 1.8–7.7)
NEUTROPHILS NFR BLD: 77.7 % (ref 38–73)
NRBC BLD-RTO: 0 /100 WBC
PLATELET # BLD AUTO: 463 K/UL (ref 150–350)
PMV BLD AUTO: 8.9 FL (ref 9.2–12.9)
POTASSIUM SERPL-SCNC: 4.9 MMOL/L (ref 3.5–5.1)
PROT SERPL-MCNC: 7.5 G/DL (ref 6–8.4)
RBC # BLD AUTO: 3.18 M/UL (ref 4–5.4)
SODIUM SERPL-SCNC: 138 MMOL/L (ref 136–145)
WBC # BLD AUTO: 11.41 K/UL (ref 3.9–12.7)

## 2020-07-27 PROCEDURE — 99900035 HC TECH TIME PER 15 MIN (STAT)

## 2020-07-27 PROCEDURE — 27000221 HC OXYGEN, UP TO 24 HOURS

## 2020-07-27 PROCEDURE — 12000002 HC ACUTE/MED SURGE SEMI-PRIVATE ROOM

## 2020-07-27 PROCEDURE — 97110 THERAPEUTIC EXERCISES: CPT

## 2020-07-27 PROCEDURE — 97530 THERAPEUTIC ACTIVITIES: CPT

## 2020-07-27 PROCEDURE — 25000003 PHARM REV CODE 250

## 2020-07-27 PROCEDURE — 94640 AIRWAY INHALATION TREATMENT: CPT

## 2020-07-27 PROCEDURE — 63600175 PHARM REV CODE 636 W HCPCS

## 2020-07-27 PROCEDURE — 85025 COMPLETE CBC W/AUTO DIFF WBC: CPT

## 2020-07-27 PROCEDURE — 94761 N-INVAS EAR/PLS OXIMETRY MLT: CPT

## 2020-07-27 PROCEDURE — 36415 COLL VENOUS BLD VENIPUNCTURE: CPT

## 2020-07-27 PROCEDURE — 83735 ASSAY OF MAGNESIUM: CPT

## 2020-07-27 PROCEDURE — 25000242 PHARM REV CODE 250 ALT 637 W/ HCPCS

## 2020-07-27 PROCEDURE — 80053 COMPREHEN METABOLIC PANEL: CPT

## 2020-07-27 RX ADMIN — HYDROCODONE BITARTRATE AND ACETAMINOPHEN 1 TABLET: 10; 325 TABLET ORAL at 05:07

## 2020-07-27 RX ADMIN — IPRATROPIUM BROMIDE AND ALBUTEROL SULFATE 3 ML: .5; 3 SOLUTION RESPIRATORY (INHALATION) at 08:07

## 2020-07-27 RX ADMIN — PANTOPRAZOLE SODIUM 40 MG: 40 TABLET, DELAYED RELEASE ORAL at 05:07

## 2020-07-27 RX ADMIN — METOPROLOL SUCCINATE 50 MG: 50 TABLET, EXTENDED RELEASE ORAL at 09:07

## 2020-07-27 RX ADMIN — HYDROCODONE BITARTRATE AND ACETAMINOPHEN 1 TABLET: 10; 325 TABLET ORAL at 09:07

## 2020-07-27 RX ADMIN — LINEZOLID 600 MG: 600 INJECTION, SOLUTION INTRAVENOUS at 10:07

## 2020-07-27 RX ADMIN — IPRATROPIUM BROMIDE AND ALBUTEROL SULFATE 3 ML: .5; 3 SOLUTION RESPIRATORY (INHALATION) at 03:07

## 2020-07-27 RX ADMIN — HYDROCODONE BITARTRATE AND ACETAMINOPHEN 1 TABLET: 10; 325 TABLET ORAL at 04:07

## 2020-07-27 RX ADMIN — HYDROCODONE BITARTRATE AND ACETAMINOPHEN 1 TABLET: 10; 325 TABLET ORAL at 11:07

## 2020-07-27 RX ADMIN — LISINOPRIL 20 MG: 20 TABLET ORAL at 09:07

## 2020-07-27 RX ADMIN — ASPIRIN 81 MG: 81 TABLET, COATED ORAL at 09:07

## 2020-07-27 RX ADMIN — ENOXAPARIN SODIUM 40 MG: 40 INJECTION SUBCUTANEOUS at 04:07

## 2020-07-27 RX ADMIN — IPRATROPIUM BROMIDE AND ALBUTEROL SULFATE 3 ML: .5; 3 SOLUTION RESPIRATORY (INHALATION) at 07:07

## 2020-07-27 RX ADMIN — PRAVASTATIN SODIUM 40 MG: 40 TABLET ORAL at 11:07

## 2020-07-27 RX ADMIN — LINEZOLID 600 MG: 600 INJECTION, SOLUTION INTRAVENOUS at 09:07

## 2020-07-27 RX ADMIN — ISOSORBIDE MONONITRATE 60 MG: 30 TABLET, EXTENDED RELEASE ORAL at 09:07

## 2020-07-27 NOTE — ASSESSMENT & PLAN NOTE
This is further complicated by a loculated pleural effusion status post CT-guided thoracentesis.  Patient doing well postop.  Cultures pending.

## 2020-07-27 NOTE — PLAN OF CARE
35% venti mask with oxygen maintaining around 90-92%. Chest drainage to left chest wall. Purewick in place. Contact precautions for MRSA in urine.

## 2020-07-27 NOTE — PROGRESS NOTES
[facility] Medicine  Progress Note    Patient Name: Martina Betancourt  MRN: [id number]  Patient Class: IP- Inpatient   Admission Date: 7/10/2020  Length of Stay: 16 days  Attending Physician: Joe Fuller III, MD  Primary Care Provider: Joe Fuller Iii, MD        Subjective:     Principal Problem:<principal problem not specified>        HPI:  The patient is a 69-year-old female who presented to our facility with severe sepsis secondary to MRSA bacteremia    Overview/Hospital Course:  Patient progressed fairly well throughout her hospital course thus far.  This was complicated by community-acquired pneumonia with loculated pleural effusion.  She required CT-guided paracentesis.    Interval History:  No acute events overnight.    Review of Systems  Objective:     Vital Signs (Most Recent):  Temp: 98.5 °F (36.9 °C) (07/26/20 1605)  Pulse: 67 (07/26/20 1605)  Resp: (!) 22 (07/26/20 1735)  BP: 138/61 (07/26/20 1605)  SpO2: (!) 91 % (07/26/20 1605) Vital Signs (24h Range):  Temp:  [97.9 °F (36.6 °C)-98.6 °F (37 °C)] 98.5 °F (36.9 °C)  Pulse:  [62-71] 67  Resp:  [18-22] 22  SpO2:  [91 %-96 %] 91 %  BP: (125-166)/(61-70) 138/61     Weight: (!) 152.4 kg (335 lb 15.7 oz)  Body mass index is 48.21 kg/m².    Intake/Output Summary (Last 24 hours) at 7/26/2020 1858  Last data filed at 7/26/2020 0600  Gross per 24 hour   Intake 1319 ml   Output 3400 ml   Net -2081 ml      Physical Exam  Vitals signs and nursing note reviewed.   Constitutional:       Appearance: She is obese.      Comments: Chronically ill-appearing.   HENT:      Head: Normocephalic and atraumatic.      Nose: Nose normal.   Eyes:      Extraocular Movements: Extraocular movements intact.      Pupils: Pupils are equal, round, and reactive to light.   Neck:      Musculoskeletal: Normal range of motion and neck supple.   Cardiovascular:      Rate and Rhythm: Normal rate and regular rhythm.      Heart sounds: No murmur. No friction rub. No gallop.     Pulmonary:      Effort: Pulmonary effort is normal.      Breath sounds: Rales present.      Comments: Decreased breath sounds at the bases  Abdominal:      General: Abdomen is flat. Bowel sounds are normal.      Palpations: Abdomen is soft.   Musculoskeletal:         General: No swelling or deformity.      Comments: Trace edema bilateral lower extremities.  Diffuse weakness noted.  Right lower extremity currently in splint.   Skin:     General: Skin is warm and dry.      Capillary Refill: Capillary refill takes less than 2 seconds.   Neurological:      General: No focal deficit present.      Mental Status: She is alert.      Motor: Weakness present.      Gait: Gait abnormal.   Psychiatric:         Mood and Affect: Mood normal.      Comments: Dimension noted         Significant Labs: All pertinent labs within the past 24 hours have been reviewed.    Significant Imaging: I have reviewed and interpreted all pertinent imaging results/findings within the past 24 hours.      Assessment/Plan:      Closed high lateral malleolus fracture, right, with delayed healing, subsequent encounter  Status post splint placement.  Defer further management to primary team.      Dementia without behavioral disturbance  Patient appears to be at baseline.      Pleural effusion  Status post thoracentesis.  Doing well postop thus far.      Community acquired pneumonia  This is further complicated by a loculated pleural effusion status post CT-guided thoracentesis.  Patient doing well postop.  Cultures pending.      MRSA bacteremia  Continues Zyvox at current dosing      Severe sepsis  This is secondary to MRSA bacteremia.  Continue Zyvox.  Patient completed 14 days of vancomycin and Zosyn as well as 7 days of Cleocin.  White blood cell count is stable.        VTE Risk Mitigation (From admission, onward)         Ordered     enoxaparin injection 40 mg  Every 24 hours      07/25/20 1980                      Gokul Ndiaye Jr, MD  Department  of [facility] Medicine   [facility]

## 2020-07-27 NOTE — PROGRESS NOTES
[facility] Medicine  Progress Note    Patient Name: Martina Betancourt  MRN: [id number]  Patient Class: IP- Inpatient   Admission Date: 7/10/2020  Length of Stay: 17 days  Attending Physician: Joe Fuller III, MD  Primary Care Provider: Joe Fuller Iii, MD        Subjective:     Principal Problem:Severe sepsis        HPI:  The patient is a 69-year-old female who presented to our facility with severe sepsis secondary to MRSA bacteremia.    Overview/Hospital Course:  Patient progressed fairly well throughout her hospital course thus far.  This was complicated by community-acquired pneumonia with loculated pleural effusion.  She required CT-guided paracentesis.    7/27/20 FM -  today the patient is without any new complaints.  I am unsure the volume of fluid that was removed but we need repeat imaging studies and to discuss with General surgery whether or not further surgical into in intervention is needed for her pleural effusion.    No new subjective & objective note has been filed under this hospital service since the last note was generated.      Assessment/Plan:      * Severe sepsis  This is secondary to MRSA bacteremia.  Continue Zyvox.  Patient completed 14 days of vancomycin and Zosyn as well as 7 days of Cleocin.  White blood cell count is stable.      Closed high lateral malleolus fracture, right, with delayed healing, subsequent encounter  Status post splint placement.  Defer further management to primary team.      Dementia without behavioral disturbance  Patient appears to be at baseline.      Pleural effusion  Status post thoracentesis.  Doing well postop thus far.    ? How much volume removed?    Community acquired pneumonia  This is further complicated by a loculated pleural effusion status post CT-guided thoracentesis.  Patient doing well postop.  Cultures pending.      MRSA bacteremia  Continues Zyvox at current dosing        VTE Risk Mitigation (From admission, onward)          Ordered     enoxaparin injection 40 mg  Every 24 hours      07/25/20 6401                      Joe Fuller Iii, MD  Department of [facility] Medicine   [facility]

## 2020-07-27 NOTE — PLAN OF CARE
Pt's vitals are stable. Pt worked with PT and SPO2 decreased while moving in bed. Pt received pain medication for back pain. No complaints at this time. Will continue to monitor.

## 2020-07-27 NOTE — PLAN OF CARE
Pt's vitals were stable. Complained of back pain, administered PO pain med. Clear, yellow urine voided via purewick. Will continue to monitor.

## 2020-07-27 NOTE — PROGRESS NOTES
[facility]  Progress Note    Patient Name: Martina Betancourt  MRN: [id number]  Patient Class: IP- Inpatient   Admission Date: 7/10/2020  Length of Stay: 17 days  Attending Physician: Joe Fuller III, MD  Primary Care Provider: Joe Fuller Iii, MD        Subjective:     Principal Problem:Severe sepsis        HPI:  The patient is a 69-year-old female who presented to our facility with severe sepsis secondary to MRSA bacteremia.    Overview/Hospital Course:  Patient progressed fairly well throughout her hospital course thus far.  This was complicated by community-acquired pneumonia with loculated pleural effusion.  She required CT-guided paracentesis.    7/27/20 FM -  today the patient is without any new complaints.  I am unsure the volume of fluid that was removed but we need repeat imaging studies and to discuss with General surgery whether or not further surgical into in intervention is needed for her pleural effusion.    Interval History: See Note.    Review of Systems   Constitutional: Negative for activity change, chills and fever.   Respiratory: Positive for chest tightness, shortness of breath and wheezing.    Cardiovascular: Negative for chest pain and leg swelling.   Gastrointestinal: Negative for abdominal distention, abdominal pain, blood in stool and nausea.   Neurological: Positive for weakness and light-headedness.     Objective:     Vital Signs (Most Recent):  Temp: 98.3 °F (36.8 °C) (07/27/20 0400)  Pulse: 66 (07/27/20 0400)  Resp: 18 (07/27/20 0512)  BP: (!) 142/75 (07/27/20 0400)  SpO2: (!) 93 % (07/26/20 2046) Vital Signs (24h Range):  Temp:  [97.2 °F (36.2 °C)-98.5 °F (36.9 °C)] 98.3 °F (36.8 °C)  Pulse:  [62-70] 66  Resp:  [18-24] 18  SpO2:  [91 %-96 %] 93 %  BP: (113-148)/(56-77) 142/75     Weight: (!) 152.4 kg (335 lb 15.7 oz)  Body mass index is 48.21 kg/m².    Intake/Output Summary (Last 24 hours) at 7/27/2020 0818  Last data filed at 7/27/2020 0600  Gross per 24  hour   Intake 1747 ml   Output 1800 ml   Net -53 ml      Physical Exam  Constitutional:       Appearance: She is obese.   HENT:      Head: Normocephalic and atraumatic.   Cardiovascular:      Rate and Rhythm: Normal rate and regular rhythm.   Pulmonary:      Effort: Respiratory distress present.      Breath sounds: Rhonchi and rales present.   Abdominal:      General: There is no distension.      Palpations: There is no mass.      Tenderness: There is no abdominal tenderness. There is no guarding or rebound.      Hernia: No hernia is present.   Neurological:      Mental Status: She is alert.         Significant Labs:   Recent Lab Results       07/27/20  0410        Albumin 1.5     Alkaline Phosphatase 90     ALT 28     Anion Gap 3     AST 26     Baso # 0.03     Basophil% 0.3     BILIRUBIN TOTAL 0.2  Comment:  For infants and newborns, interpretation of results should be based  on gestational age, weight and in agreement with clinical  observations.  Premature Infant recommended reference ranges:  Up to 24 hours.............<8.0 mg/dL  Up to 48 hours............<12.0 mg/dL  3-5 days..................<15.0 mg/dL  6-29 days.................<15.0 mg/dL  For patients on Eltrombopag therapy, use of Dimension [place] TBIL is   not   recommended.       BUN, Bld 15     Calcium 9.9     Chloride 102     CO2 33     Creatinine 1.0     Differential Method Automated     eGFR if African American >60.0     eGFR if non  56.8  Comment:  Calculation used to obtain the estimated glomerular filtration  rate (eGFR) is the CKD-EPI equation.        Eos # 0.1     Eosinophil% 1.1     Glucose 116     Gran # (ANC) 8.9     Gran% 77.7     Hematocrit 28.9     Hemoglobin 9.3     Immature Grans (Abs) 0.09  Comment:  Mild elevation in immature granulocytes is non specific and   can be seen in a variety of conditions including stress response,   acute inflammation, trauma and pregnancy. Correlation with other   laboratory and clinical  findings is essential.       Immature Granulocytes 0.8     Lymph # 1.2     Lymph% 10.7     Magnesium 2.1     MCH 29.2     MCHC 32.2     MCV 91     Mono # 1.1     Mono% 9.4     MPV 8.9     nRBC 0     Platelets 463     Potassium 4.9     PROTEIN TOTAL 7.5     RBC 3.18     RDW 15.9     Sodium 138     WBC 11.41           Significant Imaging: I have reviewed and interpreted all pertinent imaging results/findings within the past 24 hours.      Assessment/Plan:      * Severe sepsis  This is secondary to MRSA bacteremia.  Continue Zyvox.  Patient completed 14 days of vancomycin and Zosyn as well as 7 days of Cleocin.  White blood cell count is stable.      Closed high lateral malleolus fracture, right, with delayed healing, subsequent encounter  Status post splint placement.  Defer further management to primary team.      Dementia without behavioral disturbance  Patient appears to be at baseline.      Pleural effusion  Status post thoracentesis.  Doing well postop thus far.    ? How much volume removed?    Community acquired pneumonia  This is further complicated by a loculated pleural effusion status post CT-guided thoracentesis.  Patient doing well postop.  Cultures pending.      MRSA bacteremia  Continues Zyvox at current dosing        VTE Risk Mitigation (From admission, onward)         Ordered     enoxaparin injection 40 mg  Every 24 hours      07/25/20 0621                      Joe Fuller Iii, MD  Department of Hospital Medicine   [facility]

## 2020-07-27 NOTE — PT/OT/SLP PROGRESS
Occupational Therapy   Treatment    Name: Martina Betancourt  MRN: [id number]  Admitting Diagnosis:  Severe sepsis       Recommendations:     Discharge Recommendations: nursing facility, skilled  Discharge Equipment Recommendations:  bedside commode, bath bench, grab bar, oxygen, walker, rolling, wheelchair  Barriers to discharge:  Inaccessible home environment    Assessment:     Martina Betancourt is a 69 y.o. female with a medical diagnosis of Severe sepsis.  She presents with decreased strength, decreased functional mobility, decreased endurance, decreased ADL.    Rehab Prognosis:  Good; patient would benefit from acute skilled OT services to address these deficits and reach maximum level of function.       Plan:     Patient to be seen 6 x/week to address the above listed problems via self-care/home management, therapeutic activities, therapeutic exercises  · Plan of Care Expires: 07/31/20  · Plan of Care Reviewed with: patient    Subjective     Pain/Comfort:  · Pain Rating 1: 6/10  · Location - Side 1: Right(RLE)  · Location - Orientation 1: lower  · Location 1: leg  · Pain Addressed 1: Nurse notified  · Pain Rating Post-Intervention 1: 6/10    Objective:     Communicated with: Nursing prior to session.  Patient found supine with arterial line, chest tube, oxygen upon OT entry to room.    General Precautions: Standard, airborne, droplet, fall   Orthopedic Precautions:RLE non weight bearing   Braces: (Ankle immobilizer)     Occupational Performance:     Activities of Daily Living:  · Feeding:  minimum assistance and moderate assistance to bring straw to mouth and adjust ventimask      AMPAC 6 Click ADL: 9    Treatment & Education    Patient left supine with call button in reachEducation:      GOALS:   Multidisciplinary Problems     Occupational Therapy Goals        Problem: Occupational Therapy Goal    Goal Priority Disciplines Outcome Interventions   Occupational Therapy Goal     OT, PT/OT     Description: Goals to be  met by: End of week     Patient will increase functional independence with ADLs by performing:    Feeding with Set-up Assistance.  Sitting at edge of bed x 10 minutes with Supervision.  Supine to sit with Contact Guard Assistance.  Upper extremity exercise program x20 reps per handout, with assistance as needed.                     Time Tracking:     OT Date of Treatment: 07/27/20  OT Start Time: 0130  OT Stop Time: 0153  OT Total Time (min): 23 min    Billable Minutes:Therapeutic Activity 23    Farheen Camacho OT  7/27/2020

## 2020-07-27 NOTE — ASSESSMENT & PLAN NOTE
This is secondary to MRSA bacteremia.  Continue Zyvox.  Patient completed 14 days of vancomycin and Zosyn as well as 7 days of Cleocin.  White blood cell count is stable.

## 2020-07-27 NOTE — SUBJECTIVE & OBJECTIVE
Interval History: See Note.    Review of Systems   Constitutional: Negative for activity change, chills and fever.   Respiratory: Positive for chest tightness, shortness of breath and wheezing.    Cardiovascular: Negative for chest pain and leg swelling.   Gastrointestinal: Negative for abdominal distention, abdominal pain, blood in stool and nausea.   Neurological: Positive for weakness and light-headedness.     Objective:     Vital Signs (Most Recent):  Temp: 98.3 °F (36.8 °C) (07/27/20 0400)  Pulse: 66 (07/27/20 0400)  Resp: 18 (07/27/20 0512)  BP: (!) 142/75 (07/27/20 0400)  SpO2: (!) 93 % (07/26/20 2046) Vital Signs (24h Range):  Temp:  [97.2 °F (36.2 °C)-98.5 °F (36.9 °C)] 98.3 °F (36.8 °C)  Pulse:  [62-70] 66  Resp:  [18-24] 18  SpO2:  [91 %-96 %] 93 %  BP: (113-148)/(56-77) 142/75     Weight: (!) 152.4 kg (335 lb 15.7 oz)  Body mass index is 48.21 kg/m².    Intake/Output Summary (Last 24 hours) at 7/27/2020 0818  Last data filed at 7/27/2020 0600  Gross per 24 hour   Intake 1747 ml   Output 1800 ml   Net -53 ml      Physical Exam  Constitutional:       Appearance: She is obese.   HENT:      Head: Normocephalic and atraumatic.   Cardiovascular:      Rate and Rhythm: Normal rate and regular rhythm.   Pulmonary:      Effort: Respiratory distress present.      Breath sounds: Rhonchi and rales present.   Abdominal:      General: There is no distension.      Palpations: There is no mass.      Tenderness: There is no abdominal tenderness. There is no guarding or rebound.      Hernia: No hernia is present.   Neurological:      Mental Status: She is alert.         Significant Labs:   Recent Lab Results       07/27/20  0410        Albumin 1.5     Alkaline Phosphatase 90     ALT 28     Anion Gap 3     AST 26     Baso # 0.03     Basophil% 0.3     BILIRUBIN TOTAL 0.2  Comment:  For infants and newborns, interpretation of results should be based  on gestational age, weight and in agreement with  clinical  observations.  Premature Infant recommended reference ranges:  Up to 24 hours.............<8.0 mg/dL  Up to 48 hours............<12.0 mg/dL  3-5 days..................<15.0 mg/dL  6-29 days.................<15.0 mg/dL  For patients on Eltrombopag therapy, use of Dimension [place] TBIL is   not   recommended.       BUN, Bld 15     Calcium 9.9     Chloride 102     CO2 33     Creatinine 1.0     Differential Method Automated     eGFR if African American >60.0     eGFR if non  56.8  Comment:  Calculation used to obtain the estimated glomerular filtration  rate (eGFR) is the CKD-EPI equation.        Eos # 0.1     Eosinophil% 1.1     Glucose 116     Gran # (ANC) 8.9     Gran% 77.7     Hematocrit 28.9     Hemoglobin 9.3     Immature Grans (Abs) 0.09  Comment:  Mild elevation in immature granulocytes is non specific and   can be seen in a variety of conditions including stress response,   acute inflammation, trauma and pregnancy. Correlation with other   laboratory and clinical findings is essential.       Immature Granulocytes 0.8     Lymph # 1.2     Lymph% 10.7     Magnesium 2.1     MCH 29.2     MCHC 32.2     MCV 91     Mono # 1.1     Mono% 9.4     MPV 8.9     nRBC 0     Platelets 463     Potassium 4.9     PROTEIN TOTAL 7.5     RBC 3.18     RDW 15.9     Sodium 138     WBC 11.41           Significant Imaging: I have reviewed and interpreted all pertinent imaging results/findings within the past 24 hours.

## 2020-07-28 PROBLEM — S82.209A TIBIAL FRACTURE: Status: ACTIVE | Noted: 2020-07-28

## 2020-07-28 PROBLEM — G72.9 MYOPATHY: Status: ACTIVE | Noted: 2020-07-28

## 2020-07-28 LAB
ALBUMIN SERPL BCP-MCNC: 1.6 G/DL (ref 3.5–5.2)
ALP SERPL-CCNC: 89 U/L (ref 55–135)
ALT SERPL W/O P-5'-P-CCNC: 34 U/L (ref 10–44)
ANION GAP SERPL CALC-SCNC: 8 MMOL/L (ref 8–16)
AST SERPL-CCNC: 31 U/L (ref 10–40)
BASOPHILS # BLD AUTO: 0.03 K/UL (ref 0–0.2)
BASOPHILS NFR BLD: 0.3 % (ref 0–1.9)
BILIRUB SERPL-MCNC: 0.3 MG/DL (ref 0.1–1)
BUN SERPL-MCNC: 15 MG/DL (ref 8–23)
CALCIUM SERPL-MCNC: 10 MG/DL (ref 8.7–10.5)
CHLORIDE SERPL-SCNC: 101 MMOL/L (ref 95–110)
CO2 SERPL-SCNC: 29 MMOL/L (ref 23–29)
CREAT SERPL-MCNC: 1 MG/DL (ref 0.5–1.4)
DIFFERENTIAL METHOD: ABNORMAL
EOSINOPHIL # BLD AUTO: 0.1 K/UL (ref 0–0.5)
EOSINOPHIL NFR BLD: 1.1 % (ref 0–8)
ERYTHROCYTE [DISTWIDTH] IN BLOOD BY AUTOMATED COUNT: 15.9 % (ref 11.5–14.5)
EST. GFR  (AFRICAN AMERICAN): >60 ML/MIN/1.73 M^2
EST. GFR  (NON AFRICAN AMERICAN): 56.8 ML/MIN/1.73 M^2
GLUCOSE SERPL-MCNC: 95 MG/DL (ref 70–110)
HCT VFR BLD AUTO: 32.1 % (ref 37–48.5)
HGB BLD-MCNC: 10 G/DL (ref 12–16)
IMM GRANULOCYTES # BLD AUTO: 0.04 K/UL (ref 0–0.04)
IMM GRANULOCYTES NFR BLD AUTO: 0.4 % (ref 0–0.5)
LYMPHOCYTES # BLD AUTO: 1.1 K/UL (ref 1–4.8)
LYMPHOCYTES NFR BLD: 10.8 % (ref 18–48)
MAGNESIUM SERPL-MCNC: 1.9 MG/DL (ref 1.6–2.6)
MCH RBC QN AUTO: 28.7 PG (ref 27–31)
MCHC RBC AUTO-ENTMCNC: 31.2 G/DL (ref 32–36)
MCV RBC AUTO: 92 FL (ref 82–98)
MONOCYTES # BLD AUTO: 1.1 K/UL (ref 0.3–1)
MONOCYTES NFR BLD: 10.9 % (ref 4–15)
NEUTROPHILS # BLD AUTO: 8 K/UL (ref 1.8–7.7)
NEUTROPHILS NFR BLD: 76.5 % (ref 38–73)
NRBC BLD-RTO: 0 /100 WBC
PLATELET # BLD AUTO: 450 K/UL (ref 150–350)
PMV BLD AUTO: 9.1 FL (ref 9.2–12.9)
POTASSIUM SERPL-SCNC: 4.8 MMOL/L (ref 3.5–5.1)
PROT SERPL-MCNC: 7.6 G/DL (ref 6–8.4)
RBC # BLD AUTO: 3.49 M/UL (ref 4–5.4)
SODIUM SERPL-SCNC: 138 MMOL/L (ref 136–145)
WBC # BLD AUTO: 10.46 K/UL (ref 3.9–12.7)

## 2020-07-28 PROCEDURE — 36415 COLL VENOUS BLD VENIPUNCTURE: CPT

## 2020-07-28 PROCEDURE — 83735 ASSAY OF MAGNESIUM: CPT

## 2020-07-28 PROCEDURE — 85025 COMPLETE CBC W/AUTO DIFF WBC: CPT

## 2020-07-28 PROCEDURE — 27000221 HC OXYGEN, UP TO 24 HOURS

## 2020-07-28 PROCEDURE — 97110 THERAPEUTIC EXERCISES: CPT

## 2020-07-28 PROCEDURE — 94640 AIRWAY INHALATION TREATMENT: CPT

## 2020-07-28 PROCEDURE — 25000242 PHARM REV CODE 250 ALT 637 W/ HCPCS

## 2020-07-28 PROCEDURE — 94761 N-INVAS EAR/PLS OXIMETRY MLT: CPT

## 2020-07-28 PROCEDURE — 12000002 HC ACUTE/MED SURGE SEMI-PRIVATE ROOM

## 2020-07-28 PROCEDURE — 80053 COMPREHEN METABOLIC PANEL: CPT

## 2020-07-28 PROCEDURE — 63600175 PHARM REV CODE 636 W HCPCS

## 2020-07-28 PROCEDURE — 25000003 PHARM REV CODE 250

## 2020-07-28 PROCEDURE — 99900035 HC TECH TIME PER 15 MIN (STAT)

## 2020-07-28 RX ADMIN — ISOSORBIDE MONONITRATE 60 MG: 30 TABLET, EXTENDED RELEASE ORAL at 08:07

## 2020-07-28 RX ADMIN — HYDROCODONE BITARTRATE AND ACETAMINOPHEN 1 TABLET: 10; 325 TABLET ORAL at 07:07

## 2020-07-28 RX ADMIN — ENOXAPARIN SODIUM 40 MG: 40 INJECTION SUBCUTANEOUS at 04:07

## 2020-07-28 RX ADMIN — LISINOPRIL 20 MG: 20 TABLET ORAL at 08:07

## 2020-07-28 RX ADMIN — HYDROCODONE BITARTRATE AND ACETAMINOPHEN 1 TABLET: 10; 325 TABLET ORAL at 01:07

## 2020-07-28 RX ADMIN — PANTOPRAZOLE SODIUM 40 MG: 40 TABLET, DELAYED RELEASE ORAL at 07:07

## 2020-07-28 RX ADMIN — METOPROLOL SUCCINATE 50 MG: 50 TABLET, EXTENDED RELEASE ORAL at 09:07

## 2020-07-28 RX ADMIN — LINEZOLID 600 MG: 600 INJECTION, SOLUTION INTRAVENOUS at 09:07

## 2020-07-28 RX ADMIN — HYDROCODONE BITARTRATE AND ACETAMINOPHEN 1 TABLET: 10; 325 TABLET ORAL at 09:07

## 2020-07-28 RX ADMIN — IPRATROPIUM BROMIDE AND ALBUTEROL SULFATE 3 ML: .5; 3 SOLUTION RESPIRATORY (INHALATION) at 07:07

## 2020-07-28 RX ADMIN — LINEZOLID 600 MG: 600 INJECTION, SOLUTION INTRAVENOUS at 08:07

## 2020-07-28 RX ADMIN — IPRATROPIUM BROMIDE AND ALBUTEROL SULFATE 3 ML: .5; 3 SOLUTION RESPIRATORY (INHALATION) at 02:07

## 2020-07-28 RX ADMIN — IPRATROPIUM BROMIDE AND ALBUTEROL SULFATE 3 ML: .5; 3 SOLUTION RESPIRATORY (INHALATION) at 08:07

## 2020-07-28 RX ADMIN — PRAVASTATIN SODIUM 40 MG: 40 TABLET ORAL at 09:07

## 2020-07-28 RX ADMIN — ASPIRIN 81 MG: 81 TABLET, COATED ORAL at 08:07

## 2020-07-28 NOTE — ASSESSMENT & PLAN NOTE
Status post thoracentesis.  Doing well postop thus far.    7/28/20 LF patient's pleural drain was dislodged, general surgery following.  Will get chest x-ray today

## 2020-07-28 NOTE — PROGRESS NOTES
Physical Therapy  Treatment    Martina Betancourt   MRN: [id number]   Admitting Diagnosis: Community acquired pneumonia                          Billable Minutes:  Therapeutic Exercise 10 minutes                     General Precautions: Standard, contact for MRSA       Subjective:  Communicated with nures prior to session.        Objective:     Functional Mobility:  Bed Mobility:   Total assist for positioning only.   Transfers:    Therapeutic Activities and Exercises:  Performed AAROM/PROM of B LE's in supine with frequent rest breaks due to pt O2%fluctuating on  8L ventimask from 84% to 91% with vc's required for proper breathing technique to maintain sats.      Patient left HOB elevated with all lines intact and call button in reach.    Assessment:  Martina Betancourt is a 69 y.o. female with a medical diagnosis of Community acquired pneumonia and presents     Rehab identified problem list/impairments:weakness    Rehab potential is fair.    Activity tolerance: Poor    Discharge recommendations:SNF    Barriers to discharge:weakness; requires 24 hour care  Equipment recommendations: hospital bed    GOALS:   Multidisciplinary Problems     Physical Therapy Goals     Not on file                PLAN:    Continue POC      Brielle Hylton, PT  07/28/2020

## 2020-07-28 NOTE — ASSESSMENT & PLAN NOTE
7/28/20 patient s/p fall 7/15/20 with right medial malleolus and distal tibial fracture--continue splint plan to follow up outpatient with ortho

## 2020-07-28 NOTE — PROGRESS NOTES
[facility] Medicine  Progress Note    Patient Name: Martina Betancourt  MRN: [id number]  Patient Class: IP- Inpatient   Admission Date: 7/10/2020  Length of Stay: 18 days  Attending Physician: Joe Fuller III, MD  Primary Care Provider: Joe Fuller Iii, MD        Subjective:     Principal Problem:Community acquired pneumonia        HPI:  The patient is a 69-year-old female who presented to our facility with severe sepsis secondary to MRSA bacteremia.    Overview/Hospital Course:  Patient progressed fairly well throughout her hospital course thus far.  This was complicated by community-acquired pneumonia with loculated pleural effusion.  She required CT-guided paracentesis.    7/27/20 FM -  today the patient is without any new complaints.  I am unsure the volume of fluid that was removed but we need repeat imaging studies and to discuss with General surgery whether or not further surgical into in intervention is needed for her pleural effusion.    7/28/20 LF discussed with nurse pleural drain was dislodged on yesterday, General surgery to address today    Interval History: Patients drain dislodged on yesterday, no acute distress. Patient reports some nausea on yesterday however resolved. No other complaints or acute events at this time. No family present at bedside today    Review of Systems   Constitutional: Positive for appetite change (appetite improving). Negative for activity change, chills, diaphoresis, fatigue, fever and unexpected weight change.   HENT: Negative for congestion, postnasal drip, sore throat and trouble swallowing.    Eyes: Negative for discharge.   Respiratory: Negative for cough, chest tightness, shortness of breath and wheezing.    Cardiovascular: Negative for chest pain, palpitations and leg swelling.   Gastrointestinal: Positive for nausea. Negative for abdominal pain, blood in stool, constipation, diarrhea and vomiting.   Genitourinary: Negative for decreased urine  volume, difficulty urinating, dysuria, hematuria and urgency.   Musculoskeletal: Negative for arthralgias.   Skin: Negative for rash and wound.   Neurological: Negative for dizziness, speech difficulty, numbness and headaches.   Psychiatric/Behavioral: Negative for agitation and sleep disturbance.     Objective:     Vital Signs (Most Recent):  Temp: 98.9 °F (37.2 °C) (07/28/20 0706)  Pulse: 69 (07/28/20 0706)  Resp: 20 (07/28/20 0733)  BP: (!) 146/73 (07/28/20 0706)  SpO2: 98 % (07/28/20 0706) Vital Signs (24h Range):  Temp:  [98.2 °F (36.8 °C)-99.5 °F (37.5 °C)] 98.9 °F (37.2 °C)  Pulse:  [7-86] 69  Resp:  [18-22] 20  SpO2:  [92 %-100 %] 98 %  BP: (118-168)/(63-73) 146/73     Weight: (!) 152.4 kg (335 lb 15.7 oz)  Body mass index is 48.21 kg/m².    Intake/Output Summary (Last 24 hours) at 7/28/2020 0815  Last data filed at 7/27/2020 1800  Gross per 24 hour   Intake 1297 ml   Output 1525 ml   Net -228 ml      Physical Exam  Constitutional:       Appearance: She is obese.   HENT:      Head: Normocephalic.      Mouth/Throat:      Mouth: Mucous membranes are moist.   Cardiovascular:      Rate and Rhythm: Normal rate and regular rhythm.      Heart sounds: Normal heart sounds.   Pulmonary:      Effort: Pulmonary effort is normal.      Breath sounds: Normal breath sounds.   Abdominal:      General: Bowel sounds are normal. There is distension.      Palpations: Abdomen is soft.   Skin:     General: Skin is warm.   Neurological:      Mental Status: She is oriented to person, place, and time.   Psychiatric:         Mood and Affect: Mood normal.         Behavior: Behavior normal.         Thought Content: Thought content normal.         Judgment: Judgment normal.         Significant Labs: All pertinent labs within the past 24 hours have been reviewed.    Significant Imaging: none      Assessment/Plan:      * Community acquired pneumonia  This is further complicated by a loculated pleural effusion status post CT-guided  thoracentesis.  Patient doing well postop.  Cultures pending.    7/28/20  Patients pleural drain was dislodged, general surgery following. NO respiratory distress. Will get chest xray today and discuss case and plans; WBC normal, afebrile    Myopathy  7/28/20  patient with myopathy secondary to acute illness. Continue PT/OT      Do not resuscitate  7/28/20 LF per la post      Closed high lateral malleolus fracture, right, with delayed healing, subsequent encounter  Status post splint placement.  Defer further management to primary team.    7/28/20 patient s/p fall 7/15/20 with right medial malleolus and distal tibial fracture--continue splint plan to follow up outpatient with ortho      Dementia without behavioral disturbance  Patient appears to be at baseline.  7/28/20  baseline      Pleural effusion  Status post thoracentesis.  Doing well postop thus far.    7/28/20  patient's pleural drain was dislodged, general surgery following.  Will get chest x-ray today    MRSA bacteremia  Continues Zyvox at current dosing    7/28/20  repeat blood cultures negative, continue zyvox #6      Severe sepsis  This is secondary to MRSA bacteremia.  Continue Zyvox.  Patient completed 14 days of vancomycin and Zosyn as well as 7 days of Cleocin.  White blood cell count is stable.  7/28/20  WBC normal, continue zyvox #6        VTE Risk Mitigation (From admission, onward)         Ordered     enoxaparin injection 40 mg  Every 24 hours      07/25/20 6874                      Livia Earl NP  Department of Hospital Medicine   [facility]

## 2020-07-28 NOTE — ASSESSMENT & PLAN NOTE
This is further complicated by a loculated pleural effusion status post CT-guided thoracentesis.  Patient doing well postop.  Cultures pending.    7/28/20 LF Patients pleural drain was dislodged, general surgery following. NO respiratory distress. Will get chest xray today and discuss case and plans; WBC normal, afebrile

## 2020-07-28 NOTE — SUBJECTIVE & OBJECTIVE
Interval History: No acute events over weekend.  Denies SOB or CP.    Medications:  Continuous Infusions:  Scheduled Meds:   albuterol-ipratropium  3 mL Nebulization TID WAKE    aspirin  81 mg Oral Daily    enoxaparin  40 mg Subcutaneous Q24H    isosorbide mononitrate  60 mg Oral Daily    linezolid  600 mg Intravenous Q12H    lisinopriL  20 mg Oral Daily    metoprolol succinate  50 mg Oral Daily    pantoprazole  40 mg Oral Before breakfast    pravastatin  40 mg Oral QHS     PRN Meds:acetaminophen, albuterol-ipratropium, HYDROcodone-acetaminophen, sodium chloride 0.9%     Review of patient's allergies indicates:   Allergen Reactions    Hydroxyzine     Tizanidine      Objective:     Vital Signs (Most Recent):  Temp: 98.2 °F (36.8 °C) (07/27/20 2000)  Pulse: 66 (07/27/20 2000)  Resp: 20 (07/27/20 2000)  BP: 118/63 (07/27/20 2000)  SpO2: 96 % (07/1950) Vital Signs (24h Range):  Temp:  [98.2 °F (36.8 °C)-98.7 °F (37.1 °C)] 98.2 °F (36.8 °C)  Pulse:  [63-86] 66  Resp:  [18-22] 20  SpO2:  [93 %-100 %] 96 %  BP: (118-168)/(63-77) 118/63     Weight: (!) 152.4 kg (335 lb 15.7 oz)  Body mass index is 48.21 kg/m².    Intake/Output - Last 3 Shifts       07/25 0700 - 07/26 0659 07/26 0700 - 07/27 0659 07/27 0700 - 07/28 0659    P.O. 720 1120 960    I.V. (mL/kg) 299 (2) 300 (2)     IV Piggyback 300 327 337    Total Intake(mL/kg) 1319 (8.7) 1747 (11.5) 1297 (8.5)    Urine (mL/kg/hr) 3400 (0.9) 1700 (0.5) 1525 (0.6)    Drains  100 0    Stool 0 0 0    Total Output 3400 1800 1525    Net -2081 -53 -228                 Physical Exam  Vitals signs reviewed.   Constitutional:       General: She is not in acute distress.  Cardiovascular:      Rate and Rhythm: Normal rate and regular rhythm.   Pulmonary:      Breath sounds: Decreased air movement present.      Comments: Decreased at L base  Tube out chest and lying on bed  Abdominal:      General: Bowel sounds are normal.      Palpations: Abdomen is soft.      Tenderness:  There is no abdominal tenderness.   Musculoskeletal:      Right shoulder: She exhibits swelling.   Neurological:      Mental Status: She is alert and oriented to person, place, and time.         Significant Labs:  CBC:   Recent Labs   Lab 07/27/20 0410   WBC 11.41   RBC 3.18*   HGB 9.3*   HCT 28.9*   *   MCV 91   MCH 29.2   MCHC 32.2     BMP:   Recent Labs   Lab 07/27/20 0410   *      K 4.9      CO2 33*   BUN 15   CREATININE 1.0   CALCIUM 9.9   MG 2.1     Microbiology Results (last 7 days)     ** No results found for the last 168 hours. **        Specimen (12h ago, onward)    None          Significant Diagnostics:  No new CXR

## 2020-07-28 NOTE — PROGRESS NOTES
[facility] S[facility]  General Surgery  Progress Note    Subjective:     History of Present Illness:  No notes on file    Post-Op Info:  * No surgery found *         Interval History: No acute events over weekend.  Denies SOB or CP.    Medications:  Continuous Infusions:  Scheduled Meds:   albuterol-ipratropium  3 mL Nebulization TID WAKE    aspirin  81 mg Oral Daily    enoxaparin  40 mg Subcutaneous Q24H    isosorbide mononitrate  60 mg Oral Daily    linezolid  600 mg Intravenous Q12H    lisinopriL  20 mg Oral Daily    metoprolol succinate  50 mg Oral Daily    pantoprazole  40 mg Oral Before breakfast    pravastatin  40 mg Oral QHS     PRN Meds:acetaminophen, albuterol-ipratropium, HYDROcodone-acetaminophen, sodium chloride 0.9%     Review of patient's allergies indicates:   Allergen Reactions    Hydroxyzine     Tizanidine      Objective:     Vital Signs (Most Recent):  Temp: 98.2 °F (36.8 °C) (07/27/20 2000)  Pulse: 66 (07/27/20 2000)  Resp: 20 (07/27/20 2000)  BP: 118/63 (07/27/20 2000)  SpO2: 96 % (07/1950) Vital Signs (24h Range):  Temp:  [98.2 °F (36.8 °C)-98.7 °F (37.1 °C)] 98.2 °F (36.8 °C)  Pulse:  [63-86] 66  Resp:  [18-22] 20  SpO2:  [93 %-100 %] 96 %  BP: (118-168)/(63-77) 118/63     Weight: (!) 152.4 kg (335 lb 15.7 oz)  Body mass index is 48.21 kg/m².    Intake/Output - Last 3 Shifts       07/25 0700 - 07/26 0659 07/26 0700 - 07/27 0659 07/27 0700 - 07/28 0659    P.O. 720 1120 960    I.V. (mL/kg) 299 (2) 300 (2)     IV Piggyback 300 327 337    Total Intake(mL/kg) 1319 (8.7) 1747 (11.5) 1297 (8.5)    Urine (mL/kg/hr) 3400 (0.9) 1700 (0.5) 1525 (0.6)    Drains  100 0    Stool 0 0 0    Total Output 3400 1800 1525    Net -2081 -53 -228                 Physical Exam  Vitals signs reviewed.   Constitutional:       General: She is not in acute distress.  Cardiovascular:      Rate and Rhythm: Normal rate and regular rhythm.   Pulmonary:      Breath sounds: Decreased air movement present.       Comments: Decreased at L base  Tube out chest and lying on bed  Abdominal:      General: Bowel sounds are normal.      Palpations: Abdomen is soft.      Tenderness: There is no abdominal tenderness.   Musculoskeletal:      Right shoulder: She exhibits swelling.   Neurological:      Mental Status: She is alert and oriented to person, place, and time.         Significant Labs:  CBC:   Recent Labs   Lab 07/27/20  0410   WBC 11.41   RBC 3.18*   HGB 9.3*   HCT 28.9*   *   MCV 91   MCH 29.2   MCHC 32.2     BMP:   Recent Labs   Lab 07/27/20  0410   *      K 4.9      CO2 33*   BUN 15   CREATININE 1.0   CALCIUM 9.9   MG 2.1     Microbiology Results (last 7 days)     ** No results found for the last 168 hours. **        Specimen (12h ago, onward)    None          Significant Diagnostics:  No new CXR      Assessment/Plan:     Pleural effusion  Chest CXR in AM  Dressing to drain site  May need to repeat CT    Community acquired pneumonia  Antimicrobials per primary and culture results        Park Ortiz MD  General Surgery  [facility]

## 2020-07-28 NOTE — PROGRESS NOTES
Physical Therapy  Treatment    Martina Betancourt   MRN: [id number]   Admitting Diagnosis: Community acquired pneumonia                    Billable Minutes:  Therapeutic Exercise 15 minutes      General Precautions: Standard,     Subjective:  Communicated with nurseSheridan prior to session.      Objective:    Pt on 6 L O2 by mask; O2% 95-96% throughout today    Functional Mobility:  Bed Mobility: practiced rolling L/R with max assist for lower body management, pt able to assist with UE's though weak in shoulders and requires assist to reach rails       Therapeutic Activities and Exercises:  Performed AAROM of B LE's in supine x 15 reps each     Patient left supine with all lines intact and call button in reach.    Assessment:  Martina Betancourt is a 69 y.o. female with a medical diagnosis of Community acquired pneumonia and presents with decreased strength/mobility    Rehab identified problem list/impairments:    Rehab potential is fair.    Activity tolerance: Fair    Discharge recommendations: SNF    Barriers to discharge: wtbearing prec/24 hour assist    Equipment recommendations: pending progress     GOALS:   Multidisciplinary Problems     Physical Therapy Goals     Not on file                PLAN:    Continue strengthening, progress mobility as kaiden.       Brielle Hylton, PT  07/28/2020

## 2020-07-28 NOTE — ASSESSMENT & PLAN NOTE
Continues Zyvox at current dosing    7/28/20 LF repeat blood cultures negative, continue zyvox #6

## 2020-07-28 NOTE — SUBJECTIVE & OBJECTIVE
Interval History: Patients drain dislodged on yesterday, no acute distress. Patient reports some nausea on yesterday however resolved. No other complaints or acute events at this time. No family present at bedside today    Review of Systems   Constitutional: Positive for appetite change (appetite improving). Negative for activity change, chills, diaphoresis, fatigue, fever and unexpected weight change.   HENT: Negative for congestion, postnasal drip, sore throat and trouble swallowing.    Eyes: Negative for discharge.   Respiratory: Negative for cough, chest tightness, shortness of breath and wheezing.    Cardiovascular: Negative for chest pain, palpitations and leg swelling.   Gastrointestinal: Positive for nausea. Negative for abdominal pain, blood in stool, constipation, diarrhea and vomiting.   Genitourinary: Negative for decreased urine volume, difficulty urinating, dysuria, hematuria and urgency.   Musculoskeletal: Negative for arthralgias.   Skin: Negative for rash and wound.   Neurological: Negative for dizziness, speech difficulty, numbness and headaches.   Psychiatric/Behavioral: Negative for agitation and sleep disturbance.     Objective:     Vital Signs (Most Recent):  Temp: 98.9 °F (37.2 °C) (07/28/20 0706)  Pulse: 69 (07/28/20 0706)  Resp: 20 (07/28/20 0733)  BP: (!) 146/73 (07/28/20 0706)  SpO2: 98 % (07/28/20 0706) Vital Signs (24h Range):  Temp:  [98.2 °F (36.8 °C)-99.5 °F (37.5 °C)] 98.9 °F (37.2 °C)  Pulse:  [7-86] 69  Resp:  [18-22] 20  SpO2:  [92 %-100 %] 98 %  BP: (118-168)/(63-73) 146/73     Weight: (!) 152.4 kg (335 lb 15.7 oz)  Body mass index is 48.21 kg/m².    Intake/Output Summary (Last 24 hours) at 7/28/2020 0815  Last data filed at 7/27/2020 1800  Gross per 24 hour   Intake 1297 ml   Output 1525 ml   Net -228 ml      Physical Exam  Constitutional:       Appearance: She is obese.   HENT:      Head: Normocephalic.      Mouth/Throat:      Mouth: Mucous membranes are moist.    Cardiovascular:      Rate and Rhythm: Normal rate and regular rhythm.      Heart sounds: Normal heart sounds.   Pulmonary:      Effort: Pulmonary effort is normal.      Breath sounds: Normal breath sounds.   Abdominal:      General: Bowel sounds are normal. There is distension.      Palpations: Abdomen is soft.   Skin:     General: Skin is warm.   Neurological:      Mental Status: She is oriented to person, place, and time.   Psychiatric:         Mood and Affect: Mood normal.         Behavior: Behavior normal.         Thought Content: Thought content normal.         Judgment: Judgment normal.         Significant Labs: All pertinent labs within the past 24 hours have been reviewed.    Significant Imaging: none

## 2020-07-28 NOTE — ASSESSMENT & PLAN NOTE
This is secondary to MRSA bacteremia.  Continue Zyvox.  Patient completed 14 days of vancomycin and Zosyn as well as 7 days of Cleocin.  White blood cell count is stable.  7/28/20 LF WBC normal, continue zyvox #6

## 2020-07-28 NOTE — ASSESSMENT & PLAN NOTE
Status post splint placement.  Defer further management to primary team.    7/28/20 patient s/p fall 7/15/20 with right medial malleolus and distal tibial fracture--continue splint plan to follow up outpatient with ortho

## 2020-07-29 LAB
ALBUMIN SERPL BCP-MCNC: 1.6 G/DL (ref 3.5–5.2)
ALP SERPL-CCNC: 86 U/L (ref 55–135)
ALT SERPL W/O P-5'-P-CCNC: 33 U/L (ref 10–44)
ANION GAP SERPL CALC-SCNC: 3 MMOL/L (ref 8–16)
AST SERPL-CCNC: 30 U/L (ref 10–40)
BASOPHILS # BLD AUTO: 0.03 K/UL (ref 0–0.2)
BASOPHILS NFR BLD: 0.3 % (ref 0–1.9)
BILIRUB SERPL-MCNC: 0.3 MG/DL (ref 0.1–1)
BUN SERPL-MCNC: 15 MG/DL (ref 8–23)
CALCIUM SERPL-MCNC: 9.7 MG/DL (ref 8.7–10.5)
CHLORIDE SERPL-SCNC: 101 MMOL/L (ref 95–110)
CO2 SERPL-SCNC: 34 MMOL/L (ref 23–29)
CREAT SERPL-MCNC: 1.1 MG/DL (ref 0.5–1.4)
DIFFERENTIAL METHOD: ABNORMAL
EOSINOPHIL # BLD AUTO: 0.1 K/UL (ref 0–0.5)
EOSINOPHIL NFR BLD: 1.2 % (ref 0–8)
ERYTHROCYTE [DISTWIDTH] IN BLOOD BY AUTOMATED COUNT: 15.8 % (ref 11.5–14.5)
EST. GFR  (AFRICAN AMERICAN): 58.4 ML/MIN/1.73 M^2
EST. GFR  (NON AFRICAN AMERICAN): 50.6 ML/MIN/1.73 M^2
GLUCOSE SERPL-MCNC: 98 MG/DL (ref 70–110)
HCT VFR BLD AUTO: 30.8 % (ref 37–48.5)
HGB BLD-MCNC: 9.6 G/DL (ref 12–16)
IMM GRANULOCYTES # BLD AUTO: 0.04 K/UL (ref 0–0.04)
IMM GRANULOCYTES NFR BLD AUTO: 0.5 % (ref 0–0.5)
LYMPHOCYTES # BLD AUTO: 1 K/UL (ref 1–4.8)
LYMPHOCYTES NFR BLD: 11.6 % (ref 18–48)
MAGNESIUM SERPL-MCNC: 1.9 MG/DL (ref 1.6–2.6)
MCH RBC QN AUTO: 28.7 PG (ref 27–31)
MCHC RBC AUTO-ENTMCNC: 31.2 G/DL (ref 32–36)
MCV RBC AUTO: 92 FL (ref 82–98)
MONOCYTES # BLD AUTO: 1 K/UL (ref 0.3–1)
MONOCYTES NFR BLD: 11.5 % (ref 4–15)
NEUTROPHILS # BLD AUTO: 6.6 K/UL (ref 1.8–7.7)
NEUTROPHILS NFR BLD: 74.9 % (ref 38–73)
NRBC BLD-RTO: 0 /100 WBC
PH, BODY FLUID: 7.6
PLATELET # BLD AUTO: 457 K/UL (ref 150–350)
PMV BLD AUTO: 9 FL (ref 9.2–12.9)
POTASSIUM SERPL-SCNC: 4.6 MMOL/L (ref 3.5–5.1)
PROT SERPL-MCNC: 7.4 G/DL (ref 6–8.4)
RBC # BLD AUTO: 3.35 M/UL (ref 4–5.4)
SARS-COV-2 RDRP RESP QL NAA+PROBE: NEGATIVE
SODIUM SERPL-SCNC: 138 MMOL/L (ref 136–145)
WBC # BLD AUTO: 8.86 K/UL (ref 3.9–12.7)

## 2020-07-29 PROCEDURE — U0002 COVID-19 LAB TEST NON-CDC: HCPCS

## 2020-07-29 PROCEDURE — 12000002 HC ACUTE/MED SURGE SEMI-PRIVATE ROOM

## 2020-07-29 PROCEDURE — 27000221 HC OXYGEN, UP TO 24 HOURS

## 2020-07-29 PROCEDURE — 97530 THERAPEUTIC ACTIVITIES: CPT

## 2020-07-29 PROCEDURE — 94640 AIRWAY INHALATION TREATMENT: CPT

## 2020-07-29 PROCEDURE — 86580 TB INTRADERMAL TEST: CPT | Performed by: NURSE PRACTITIONER

## 2020-07-29 PROCEDURE — 63600175 PHARM REV CODE 636 W HCPCS

## 2020-07-29 PROCEDURE — 85025 COMPLETE CBC W/AUTO DIFF WBC: CPT

## 2020-07-29 PROCEDURE — 25000003 PHARM REV CODE 250

## 2020-07-29 PROCEDURE — 83735 ASSAY OF MAGNESIUM: CPT

## 2020-07-29 PROCEDURE — 94761 N-INVAS EAR/PLS OXIMETRY MLT: CPT

## 2020-07-29 PROCEDURE — 30200315 PPD INTRADERMAL TEST REV CODE 302: Performed by: NURSE PRACTITIONER

## 2020-07-29 PROCEDURE — 36415 COLL VENOUS BLD VENIPUNCTURE: CPT

## 2020-07-29 PROCEDURE — 99900031 HC PATIENT EDUCATION (STAT)

## 2020-07-29 PROCEDURE — 99900035 HC TECH TIME PER 15 MIN (STAT)

## 2020-07-29 PROCEDURE — 25000242 PHARM REV CODE 250 ALT 637 W/ HCPCS

## 2020-07-29 PROCEDURE — 80053 COMPREHEN METABOLIC PANEL: CPT

## 2020-07-29 RX ADMIN — PRAVASTATIN SODIUM 40 MG: 40 TABLET ORAL at 09:07

## 2020-07-29 RX ADMIN — METOPROLOL SUCCINATE 50 MG: 50 TABLET, EXTENDED RELEASE ORAL at 09:07

## 2020-07-29 RX ADMIN — ISOSORBIDE MONONITRATE 60 MG: 30 TABLET, EXTENDED RELEASE ORAL at 09:07

## 2020-07-29 RX ADMIN — HYDROCODONE BITARTRATE AND ACETAMINOPHEN 1 TABLET: 10; 325 TABLET ORAL at 11:07

## 2020-07-29 RX ADMIN — ASPIRIN 81 MG: 81 TABLET, COATED ORAL at 09:07

## 2020-07-29 RX ADMIN — LINEZOLID 600 MG: 600 INJECTION, SOLUTION INTRAVENOUS at 09:07

## 2020-07-29 RX ADMIN — LISINOPRIL 20 MG: 20 TABLET ORAL at 09:07

## 2020-07-29 RX ADMIN — ENOXAPARIN SODIUM 40 MG: 40 INJECTION SUBCUTANEOUS at 04:07

## 2020-07-29 RX ADMIN — IPRATROPIUM BROMIDE AND ALBUTEROL SULFATE 3 ML: .5; 3 SOLUTION RESPIRATORY (INHALATION) at 08:07

## 2020-07-29 RX ADMIN — HYDROCODONE BITARTRATE AND ACETAMINOPHEN 1 TABLET: 10; 325 TABLET ORAL at 05:07

## 2020-07-29 RX ADMIN — HYDROCODONE BITARTRATE AND ACETAMINOPHEN 1 TABLET: 10; 325 TABLET ORAL at 09:07

## 2020-07-29 RX ADMIN — TUBERCULIN PURIFIED PROTEIN DERIVATIVE 5 UNITS: 5 INJECTION INTRADERMAL at 10:07

## 2020-07-29 RX ADMIN — IPRATROPIUM BROMIDE AND ALBUTEROL SULFATE 3 ML: .5; 3 SOLUTION RESPIRATORY (INHALATION) at 02:07

## 2020-07-29 RX ADMIN — PANTOPRAZOLE SODIUM 40 MG: 40 TABLET, DELAYED RELEASE ORAL at 06:07

## 2020-07-29 NOTE — ASSESSMENT & PLAN NOTE
Status post splint placement.  Defer further management to primary team.    7/28/20 patient s/p fall 7/15/20 with right medial malleolus and distal tibial fracture--continue splint plan to follow up outpatient with ortho  7/29/20  plan to follow up with ortho outpatient

## 2020-07-29 NOTE — PLAN OF CARE
New order to arrange for SNF. Spoke to Dr. Fuller and Livia Earl NP who say that the patient and her daughter agreed to [facility] in [place]. I verified this with the patient and her daughter. Choice form placed in chart. Notified Cynthia with [facility] of new referral and faxed clinicals. Cynthia will begin working on referral. Cynthia aware that patient is ready for discharge once approved.

## 2020-07-29 NOTE — PROGRESS NOTES
"Pt was supine in bed when OT entered. She was lethargic. When asked if she would participate, she responded, "No, I need to rest and I already got out of bed with the other people from therapy". OT informed pt we would be checking back in with her tomorrow.  "

## 2020-07-29 NOTE — PT/OT/SLP PROGRESS
"Occupational Therapy      Patient Name:  Martina Betancourt   MRN:  [id number]    Patient not seen today secondary to Patient fatigue, Patient unwilling to participate. Will follow-up 7/30/20. Pt was supine in bed when OT entered. She was lethargic. When asked if she would participate, she responded, "No, I need to rest and I already got out of bed with the other people from therapy". OT informed pt we would be checking back in with her tomorrow.     Marleny Randolph, OT  7/29/2020  "

## 2020-07-29 NOTE — ASSESSMENT & PLAN NOTE
This is secondary to MRSA bacteremia.  Continue Zyvox.  Patient completed 14 days of vancomycin and Zosyn as well as 7 days of Cleocin.  White blood cell count is stable.  7/28/20 LF WBC normal, continue zyvox #6  7/29/20 LF continue zyvox #7

## 2020-07-29 NOTE — ASSESSMENT & PLAN NOTE
This is further complicated by a loculated pleural effusion status post CT-guided thoracentesis.  Patient doing well postop.  Cultures pending.    7/28/20  Patients pleural drain was dislodged, general surgery following. NO respiratory distress. Will get chest xray today and discuss case and plans; WBC normal, afebrile    7/29/20  xray with improvement plan to complete zyvox #7/14

## 2020-07-29 NOTE — SUBJECTIVE & OBJECTIVE
Interval History: patient reports that stomach is not having any discomfort. No acute events overnight    Review of Systems   Constitutional: Positive for appetite change (appetite improving). Negative for activity change, chills, diaphoresis, fatigue, fever and unexpected weight change.   HENT: Negative for congestion, postnasal drip, sore throat and trouble swallowing.    Eyes: Negative for discharge.   Respiratory: Negative for cough, chest tightness, shortness of breath and wheezing.    Cardiovascular: Negative for chest pain, palpitations and leg swelling.   Gastrointestinal: Negative for abdominal pain, blood in stool, constipation, diarrhea, nausea and vomiting.   Genitourinary: Negative for decreased urine volume, difficulty urinating, dysuria, hematuria and urgency.   Musculoskeletal: Negative for arthralgias.   Skin: Negative for rash and wound.   Neurological: Negative for dizziness, speech difficulty, numbness and headaches.   Psychiatric/Behavioral: Negative for agitation and sleep disturbance.     Objective:     Vital Signs (Most Recent):  Temp: 98.6 °F (37 °C) (07/29/20 0804)  Pulse: 70 (07/29/20 0804)  Resp: 20 (07/29/20 0804)  BP: 126/65 (07/29/20 0804)  SpO2: 96 % (07/29/20 0804) Vital Signs (24h Range):  Temp:  [98.3 °F (36.8 °C)-98.9 °F (37.2 °C)] 98.6 °F (37 °C)  Pulse:  [66-75] 70  Resp:  [18-20] 20  SpO2:  [94 %-98 %] 96 %  BP: (126-155)/(61-85) 126/65     Weight: (!) 152.4 kg (335 lb 15.7 oz)  Body mass index is 48.21 kg/m².    Intake/Output Summary (Last 24 hours) at 7/29/2020 0814  Last data filed at 7/29/2020 0700  Gross per 24 hour   Intake 1778 ml   Output 3000 ml   Net -1222 ml      Physical Exam  Constitutional:       Appearance: She is obese.   HENT:      Head: Normocephalic.      Mouth/Throat:      Mouth: Mucous membranes are moist.   Cardiovascular:      Rate and Rhythm: Normal rate and regular rhythm.      Heart sounds: Normal heart sounds.   Pulmonary:      Effort: Pulmonary  effort is normal.      Breath sounds: Normal breath sounds.   Abdominal:      General: Bowel sounds are normal. There is distension.      Palpations: Abdomen is soft.   Skin:     General: Skin is warm.   Neurological:      Mental Status: She is oriented to person, place, and time.   Psychiatric:         Mood and Affect: Mood normal.         Behavior: Behavior normal.         Thought Content: Thought content normal.         Judgment: Judgment normal.         Significant Labs: All pertinent labs within the past 24 hours have been reviewed.    Significant Imaging: CXR: I have reviewed all pertinent results/findings within the past 24 hours and my personal findings are:  Interval improvement in the left pleural effusion and bilateral perihilar infiltrates.

## 2020-07-29 NOTE — ASSESSMENT & PLAN NOTE
7/28/20 LF patient with myopathy secondary to acute illness. Continue PT/OT  7/29/20 LF consult for SNF at [facility]

## 2020-07-29 NOTE — PROGRESS NOTES
[facility] Medicine  Progress Note    Patient Name: Martina Betancourt  MRN: [id number]  Patient Class: IP- Inpatient   Admission Date: 7/10/2020  Length of Stay: 19 days  Attending Physician: Joe Fuller III, MD  Primary Care Provider: Joe Fuller Iii, MD        Subjective:     Principal Problem:Community acquired pneumonia        HPI:  The patient is a 69-year-old female who presented to our facility with severe sepsis secondary to MRSA bacteremia.    Overview/Hospital Course:  Patient progressed fairly well throughout her hospital course thus far.  This was complicated by community-acquired pneumonia with loculated pleural effusion.  She required CT-guided paracentesis.    7/27/20 FM -  today the patient is without any new complaints.  I am unsure the volume of fluid that was removed but we need repeat imaging studies and to discuss with General surgery whether or not further surgical into in intervention is needed for her pleural effusion.    7/28/20 LF discussed with nurse pleural drain was dislodged on yesterday, General surgery to address today  7/29/20 LF family at bedside discussed condition and discharge plans. Patient and daughter both agree that she needs SNF. Have decided on [facility]. Patient not a candidate for inpatient rehab but due to lack of mobility at home patient on a normal basis uses a [employer]    Interval History: patient reports that stomach is not having any discomfort. No acute events overnight    Review of Systems   Constitutional: Positive for appetite change (appetite improving). Negative for activity change, chills, diaphoresis, fatigue, fever and unexpected weight change.   HENT: Negative for congestion, postnasal drip, sore throat and trouble swallowing.    Eyes: Negative for discharge.   Respiratory: Negative for cough, chest tightness, shortness of breath and wheezing.    Cardiovascular: Negative for chest pain, palpitations and leg swelling.    Gastrointestinal: Negative for abdominal pain, blood in stool, constipation, diarrhea, nausea and vomiting.   Genitourinary: Negative for decreased urine volume, difficulty urinating, dysuria, hematuria and urgency.   Musculoskeletal: Negative for arthralgias.   Skin: Negative for rash and wound.   Neurological: Negative for dizziness, speech difficulty, numbness and headaches.   Psychiatric/Behavioral: Negative for agitation and sleep disturbance.     Objective:     Vital Signs (Most Recent):  Temp: 98.6 °F (37 °C) (07/29/20 0804)  Pulse: 70 (07/29/20 0804)  Resp: 20 (07/29/20 0804)  BP: 126/65 (07/29/20 0804)  SpO2: 96 % (07/29/20 0804) Vital Signs (24h Range):  Temp:  [98.3 °F (36.8 °C)-98.9 °F (37.2 °C)] 98.6 °F (37 °C)  Pulse:  [66-75] 70  Resp:  [18-20] 20  SpO2:  [94 %-98 %] 96 %  BP: (126-155)/(61-85) 126/65     Weight: (!) 152.4 kg (335 lb 15.7 oz)  Body mass index is 48.21 kg/m².    Intake/Output Summary (Last 24 hours) at 7/29/2020 0814  Last data filed at 7/29/2020 0700  Gross per 24 hour   Intake 1778 ml   Output 3000 ml   Net -1222 ml      Physical Exam  Constitutional:       Appearance: She is obese.   HENT:      Head: Normocephalic.      Mouth/Throat:      Mouth: Mucous membranes are moist.   Cardiovascular:      Rate and Rhythm: Normal rate and regular rhythm.      Heart sounds: Normal heart sounds.   Pulmonary:      Effort: Pulmonary effort is normal.      Breath sounds: Normal breath sounds.   Abdominal:      General: Bowel sounds are normal. There is distension.      Palpations: Abdomen is soft.   Skin:     General: Skin is warm.   Neurological:      Mental Status: She is oriented to person, place, and time.   Psychiatric:         Mood and Affect: Mood normal.         Behavior: Behavior normal.         Thought Content: Thought content normal.         Judgment: Judgment normal.         Significant Labs: All pertinent labs within the past 24 hours have been reviewed.    Significant Imaging: CXR:  I have reviewed all pertinent results/findings within the past 24 hours and my personal findings are:  Interval improvement in the left pleural effusion and bilateral perihilar infiltrates.         Assessment/Plan:      * Community acquired pneumonia  This is further complicated by a loculated pleural effusion status post CT-guided thoracentesis.  Patient doing well postop.  Cultures pending.    7/28/20  Patients pleural drain was dislodged, general surgery following. NO respiratory distress. Will get chest xray today and discuss case and plans; WBC normal, afebrile    7/29/20  xray with improvement plan to complete zyvox #7/14    Myopathy  7/28/20  patient with myopathy secondary to acute illness. Continue PT/OT  7/29/20  consult for SNF at [facility]      Do not resuscitate  7/28/20 LF per la post  7/29/20 LF per la post      Closed high lateral malleolus fracture, right, with delayed healing, subsequent encounter  Status post splint placement.  Defer further management to primary team.    7/28/20 patient s/p fall 7/15/20 with right medial malleolus and distal tibial fracture--continue splint plan to follow up outpatient with ortho  7/29/20  plan to follow up with ortho outpatient      Dementia without behavioral disturbance  Patient appears to be at baseline.  7/28/20  baseline  7/29/20  baseline    Pleural effusion  Status post thoracentesis.  Doing well postop thus far.    7/28/20 LF patient's pleural drain was dislodged, general surgery following.  Will get chest x-ray today    7/29/20  xray with improvment    MRSA bacteremia  Continues Zyvox at current dosing    7/28/20 LF repeat blood cultures negative, continue zyvox #6  7/29/20 LF complete zyvox #7/14      Severe sepsis  This is secondary to MRSA bacteremia.  Continue Zyvox.  Patient completed 14 days of vancomycin and Zosyn as well as 7 days of Cleocin.  White blood cell count is stable.  7/28/20 LF WBC normal, continue zyvox #6  7/29/20 LF  continue zyvox #7        VTE Risk Mitigation (From admission, onward)         Ordered     enoxaparin injection 40 mg  Every 24 hours      07/25/20 8700                      Livia Earl NP  Department of Hospital Medicine   [facility]

## 2020-07-29 NOTE — PROGRESS NOTES
Physical Therapy  Treatment    Martina Betancourt   MRN: [id number]   Admitting Diagnosis: Community acquired pneumonia                          Billable Minutes:30  Therapeutic Activity 30      General Precautions: Standard,  fall    Subjective:  Communicated with nurse prior to session.  Pt's dtr present upon arrival, pt is alert and willing to particiapte    Objective:        Functional Mobility:  Bed Mobility:   -rolling to sidelying mod assist  -Supine to/from sit edge of bed max assist x 2, good pt effort but overall weakness limits ability  -scooting up in bed total assist 2    Balance:   Static Sit: POOR+: Needs MINIMAL assist to maintain        Therapeutic Activities and Exercises:  Tolerated BS sitting x 15 minutes. O2% stable at 95% on 6 L ventimask. /67 pre treatment; 138/67 after.      Patient left supine with all lines intact and call button in reach.    Assessment:  Martina Betancourt is a 69 y.o. female with a medical diagnosis of Community acquired pneumonia and presents with decreased strength, mobility and activity kaiden.    Rehab identified problem list/impairments:      Rehab potential is fair.    Activity tolerance: Good     Discharge recommendations:   SNF    Barriers to discharge:  wtbearing restrictions R LE; 24 hour care requried    Equipment recommendations:       GOALS:   Multidisciplinary Problems     Physical Therapy Goals        Problem: Physical Therapy Goal    Goal Priority Disciplines Outcome Goal Variances Interventions   Physical Therapy Goal     PT, PT/OT      Description: Goals to be met by: 20     Patient will increase functional independence with mobility by performin. Supine to sit with Moderate Assistance  2. Sit to supine with Moderate Assistance  3. Sitting at edge of bed x20 minutes with Modified [place]                     PLAN:      Plan of Care reviewed with:  pt;dtr and nursing         Brielle Hylton PT  2020

## 2020-07-29 NOTE — PLAN OF CARE
Goal to be met by 8/12/20    1.pt will perform supine to/from sit with mod assist x 2  2. Pt will tolerate BS sitting balance x 20 minutes mod I to prepare for transfers OOB

## 2020-07-29 NOTE — ASSESSMENT & PLAN NOTE
Status post thoracentesis.  Doing well postop thus far.    7/28/20  patient's pleural drain was dislodged, general surgery following.  Will get chest x-ray today    7/29/20  xray with improvment

## 2020-07-29 NOTE — PLAN OF CARE
Pt resting at this time. No complaints voiced. Education on o2 meds and plan of care. Pt aamir rich

## 2020-07-29 NOTE — ASSESSMENT & PLAN NOTE
Continues Zyvox at current dosing    7/28/20 LF repeat blood cultures negative, continue zyvox #6  7/29/20 LF complete zyvox #7/14

## 2020-07-29 NOTE — SUBJECTIVE & OBJECTIVE
Interval History: Doing well.  No SOB.  Fed herself.  Increasing strength.      Medications:  Continuous Infusions:  Scheduled Meds:   albuterol-ipratropium  3 mL Nebulization TID WAKE    aspirin  81 mg Oral Daily    enoxaparin  40 mg Subcutaneous Q24H    isosorbide mononitrate  60 mg Oral Daily    linezolid  600 mg Intravenous Q12H    lisinopriL  20 mg Oral Daily    metoprolol succinate  50 mg Oral Daily    pantoprazole  40 mg Oral Before breakfast    pravastatin  40 mg Oral QHS     PRN Meds:acetaminophen, albuterol-ipratropium, HYDROcodone-acetaminophen, sodium chloride 0.9%     Review of patient's allergies indicates:   Allergen Reactions    Hydroxyzine     Tizanidine      Objective:     Vital Signs (Most Recent):  Temp: 98.1 °F (36.7 °C) (07/29/20 1555)  Pulse: 68 (07/29/20 1555)  Resp: 20 (07/29/20 1555)  BP: (!) 127/58 (07/29/20 1555)  SpO2: 95 % (07/29/20 1555) Vital Signs (24h Range):  Temp:  [98.1 °F (36.7 °C)-98.6 °F (37 °C)] 98.1 °F (36.7 °C)  Pulse:  [65-75] 68  Resp:  [20] 20  SpO2:  [94 %-98 %] 95 %  BP: (104-155)/(58-85) 127/58     Weight: (!) 152.4 kg (335 lb 15.7 oz)  Body mass index is 48.21 kg/m².    Intake/Output - Last 3 Shifts       07/27 0700 - 07/28 0659 07/28 0700 - 07/29 0659 07/29 0700 - 07/30 0659    P.O.     I.V. (mL/kg)   658 (4.3)    IV Piggyback 337      Total Intake(mL/kg) 1297 (8.5) 960 (6.3) 1658 (10.9)    Urine (mL/kg/hr) 1525 (0.4) 1600 (0.4) 1400 (0.8)    Drains 0      Stool 0 0     Total Output 1525 1600 1400    Net -228 -640 +258                 Physical Exam  Vitals signs and nursing note reviewed.   Constitutional:       General: She is not in acute distress.     Appearance: She is obese.   Cardiovascular:      Rate and Rhythm: Normal rate and regular rhythm.      Heart sounds: Normal heart sounds.   Pulmonary:      Effort: Pulmonary effort is normal.      Breath sounds: Normal breath sounds.   Abdominal:      General: Bowel sounds are normal.       Palpations: Abdomen is soft.   Skin:     General: Skin is warm and dry.   Neurological:      Mental Status: She is alert.         Significant Labs:  CBC:   Recent Labs   Lab 07/29/20  0525   WBC 8.86   RBC 3.35*   HGB 9.6*   HCT 30.8*   *   MCV 92   MCH 28.7   MCHC 31.2*     CMP:   Recent Labs   Lab 07/29/20  0525   GLU 98   CALCIUM 9.7   ALBUMIN 1.6*   PROT 7.4      K 4.6   CO2 34*      BUN 15   CREATININE 1.1   ALKPHOS 86   ALT 33   AST 30   BILITOT 0.3     ABGs: No results for input(s): PH, PCO2, PO2, HCO3, POCSATURATED, BE in the last 168 hours.    Significant Diagnostics:  CXR: I have reviewed all pertinent results/findings within the past 24 hours and my personal findings are:  improved L lung field with resolved effusion

## 2020-07-29 NOTE — PROGRESS NOTES
[facility] Surg  General Surgery  Progress Note    Subjective:     History of Present Illness:  No notes on file    Post-Op Info:  * No surgery found *         Interval History: Doing well.  No SOB.  Fed herself.  Increasing strength.      Medications:  Continuous Infusions:  Scheduled Meds:   albuterol-ipratropium  3 mL Nebulization TID WAKE    aspirin  81 mg Oral Daily    enoxaparin  40 mg Subcutaneous Q24H    isosorbide mononitrate  60 mg Oral Daily    linezolid  600 mg Intravenous Q12H    lisinopriL  20 mg Oral Daily    metoprolol succinate  50 mg Oral Daily    pantoprazole  40 mg Oral Before breakfast    pravastatin  40 mg Oral QHS     PRN Meds:acetaminophen, albuterol-ipratropium, HYDROcodone-acetaminophen, sodium chloride 0.9%     Review of patient's allergies indicates:   Allergen Reactions    Hydroxyzine     Tizanidine      Objective:     Vital Signs (Most Recent):  Temp: 98.1 °F (36.7 °C) (07/29/20 1555)  Pulse: 68 (07/29/20 1555)  Resp: 20 (07/29/20 1555)  BP: (!) 127/58 (07/29/20 1555)  SpO2: 95 % (07/29/20 1555) Vital Signs (24h Range):  Temp:  [98.1 °F (36.7 °C)-98.6 °F (37 °C)] 98.1 °F (36.7 °C)  Pulse:  [65-75] 68  Resp:  [20] 20  SpO2:  [94 %-98 %] 95 %  BP: (104-155)/(58-85) 127/58     Weight: (!) 152.4 kg (335 lb 15.7 oz)  Body mass index is 48.21 kg/m².    Intake/Output - Last 3 Shifts       07/27 0700 - 07/28 0659 07/28 0700 - 07/29 0659 07/29 0700 - 07/30 0659    P.O.     I.V. (mL/kg)   658 (4.3)    IV Piggyback 337      Total Intake(mL/kg) 1297 (8.5) 960 (6.3) 1658 (10.9)    Urine (mL/kg/hr) 1525 (0.4) 1600 (0.4) 1400 (0.8)    Drains 0      Stool 0 0     Total Output 1525 1600 1400    Net -228 -640 +258                 Physical Exam  Vitals signs and nursing note reviewed.   Constitutional:       General: She is not in acute distress.     Appearance: She is obese.   Cardiovascular:      Rate and Rhythm: Normal rate and regular rhythm.      Heart sounds: Normal heart  sounds.   Pulmonary:      Effort: Pulmonary effort is normal.      Breath sounds: Normal breath sounds.   Abdominal:      General: Bowel sounds are normal.      Palpations: Abdomen is soft.   Skin:     General: Skin is warm and dry.   Neurological:      Mental Status: She is alert.         Significant Labs:  CBC:   Recent Labs   Lab 07/29/20  0525   WBC 8.86   RBC 3.35*   HGB 9.6*   HCT 30.8*   *   MCV 92   MCH 28.7   MCHC 31.2*     CMP:   Recent Labs   Lab 07/29/20  0525   GLU 98   CALCIUM 9.7   ALBUMIN 1.6*   PROT 7.4      K 4.6   CO2 34*      BUN 15   CREATININE 1.1   ALKPHOS 86   ALT 33   AST 30   BILITOT 0.3     ABGs: No results for input(s): PH, PCO2, PO2, HCO3, POCSATURATED, BE in the last 168 hours.    Significant Diagnostics:  CXR: I have reviewed all pertinent results/findings within the past 24 hours and my personal findings are:  improved L lung field with resolved effusion    Assessment/Plan:     * Community acquired pneumonia  Antimicrobials per primary    Pleural effusion  Resolved per CXR  Dressing to drain site  Reconsult as needed        Park Ortiz MD  General Surgery  [facility]

## 2020-07-29 NOTE — PLAN OF CARE
07/29/20 1031   Post-Acute Status   Post-Acute Authorization Placement   Post-Acute Placement Status Referrals Sent       Sent referral to [facility] in [place]. Awaiting approval.

## 2020-07-30 VITALS
WEIGHT: 293 LBS | SYSTOLIC BLOOD PRESSURE: 132 MMHG | BODY MASS INDEX: 41.95 KG/M2 | HEART RATE: 70 BPM | DIASTOLIC BLOOD PRESSURE: 60 MMHG | HEIGHT: 70 IN | OXYGEN SATURATION: 99 % | TEMPERATURE: 99 F | RESPIRATION RATE: 20 BRPM

## 2020-07-30 PROBLEM — R65.20 SEVERE SEPSIS: Status: RESOLVED | Noted: 2020-07-25 | Resolved: 2020-07-30

## 2020-07-30 PROBLEM — A41.9 SEVERE SEPSIS: Status: RESOLVED | Noted: 2020-07-25 | Resolved: 2020-07-30

## 2020-07-30 LAB
ALBUMIN SERPL BCP-MCNC: 1.6 G/DL (ref 3.5–5.2)
ALP SERPL-CCNC: 89 U/L (ref 55–135)
ALT SERPL W/O P-5'-P-CCNC: 32 U/L (ref 10–44)
ANION GAP SERPL CALC-SCNC: 6 MMOL/L (ref 8–16)
AST SERPL-CCNC: 26 U/L (ref 10–40)
BASOPHILS # BLD AUTO: 0.04 K/UL (ref 0–0.2)
BASOPHILS NFR BLD: 0.5 % (ref 0–1.9)
BILIRUB SERPL-MCNC: 0.3 MG/DL (ref 0.1–1)
BUN SERPL-MCNC: 16 MG/DL (ref 8–23)
CALCIUM SERPL-MCNC: 9.9 MG/DL (ref 8.7–10.5)
CHLORIDE SERPL-SCNC: 100 MMOL/L (ref 95–110)
CO2 SERPL-SCNC: 32 MMOL/L (ref 23–29)
CREAT SERPL-MCNC: 1.2 MG/DL (ref 0.5–1.4)
DIFFERENTIAL METHOD: ABNORMAL
EOSINOPHIL # BLD AUTO: 0.1 K/UL (ref 0–0.5)
EOSINOPHIL NFR BLD: 1.2 % (ref 0–8)
ERYTHROCYTE [DISTWIDTH] IN BLOOD BY AUTOMATED COUNT: 16 % (ref 11.5–14.5)
EST. GFR  (AFRICAN AMERICAN): 52.5 ML/MIN/1.73 M^2
EST. GFR  (NON AFRICAN AMERICAN): 45.6 ML/MIN/1.73 M^2
GLUCOSE SERPL-MCNC: 110 MG/DL (ref 70–110)
HCT VFR BLD AUTO: 30.8 % (ref 37–48.5)
HGB BLD-MCNC: 9.7 G/DL (ref 12–16)
IMM GRANULOCYTES # BLD AUTO: 0.05 K/UL (ref 0–0.04)
IMM GRANULOCYTES NFR BLD AUTO: 0.6 % (ref 0–0.5)
LYMPHOCYTES # BLD AUTO: 1 K/UL (ref 1–4.8)
LYMPHOCYTES NFR BLD: 11.6 % (ref 18–48)
MAGNESIUM SERPL-MCNC: 1.9 MG/DL (ref 1.6–2.6)
MCH RBC QN AUTO: 29.2 PG (ref 27–31)
MCHC RBC AUTO-ENTMCNC: 31.5 G/DL (ref 32–36)
MCV RBC AUTO: 93 FL (ref 82–98)
MONOCYTES # BLD AUTO: 1.2 K/UL (ref 0.3–1)
MONOCYTES NFR BLD: 13.5 % (ref 4–15)
NEUTROPHILS # BLD AUTO: 6.4 K/UL (ref 1.8–7.7)
NEUTROPHILS NFR BLD: 72.6 % (ref 38–73)
NRBC BLD-RTO: 0 /100 WBC
PLATELET # BLD AUTO: 430 K/UL (ref 150–350)
PMV BLD AUTO: 9 FL (ref 9.2–12.9)
POTASSIUM SERPL-SCNC: 4.3 MMOL/L (ref 3.5–5.1)
PROT SERPL-MCNC: 7.6 G/DL (ref 6–8.4)
RBC # BLD AUTO: 3.32 M/UL (ref 4–5.4)
SODIUM SERPL-SCNC: 138 MMOL/L (ref 136–145)
WBC # BLD AUTO: 8.83 K/UL (ref 3.9–12.7)

## 2020-07-30 PROCEDURE — 85025 COMPLETE CBC W/AUTO DIFF WBC: CPT

## 2020-07-30 PROCEDURE — 94640 AIRWAY INHALATION TREATMENT: CPT

## 2020-07-30 PROCEDURE — 25000003 PHARM REV CODE 250

## 2020-07-30 PROCEDURE — 25000242 PHARM REV CODE 250 ALT 637 W/ HCPCS

## 2020-07-30 PROCEDURE — 99900035 HC TECH TIME PER 15 MIN (STAT)

## 2020-07-30 PROCEDURE — 63600175 PHARM REV CODE 636 W HCPCS

## 2020-07-30 PROCEDURE — 27000221 HC OXYGEN, UP TO 24 HOURS

## 2020-07-30 PROCEDURE — 36415 COLL VENOUS BLD VENIPUNCTURE: CPT

## 2020-07-30 PROCEDURE — 94761 N-INVAS EAR/PLS OXIMETRY MLT: CPT

## 2020-07-30 PROCEDURE — 99900031 HC PATIENT EDUCATION (STAT)

## 2020-07-30 PROCEDURE — 83735 ASSAY OF MAGNESIUM: CPT

## 2020-07-30 PROCEDURE — 80053 COMPREHEN METABOLIC PANEL: CPT

## 2020-07-30 RX ORDER — ACETAMINOPHEN 325 MG/1
650 TABLET ORAL EVERY 6 HOURS PRN
Refills: 0
Start: 2020-07-30

## 2020-07-30 RX ORDER — HYDROCODONE BITARTRATE AND ACETAMINOPHEN 10; 325 MG/1; MG/1
1 TABLET ORAL EVERY 4 HOURS PRN
Refills: 0 | Status: ON HOLD
Start: 2020-07-30 | End: 2020-08-12 | Stop reason: HOSPADM

## 2020-07-30 RX ORDER — IPRATROPIUM BROMIDE AND ALBUTEROL SULFATE 2.5; .5 MG/3ML; MG/3ML
3 SOLUTION RESPIRATORY (INHALATION) 3 TIMES DAILY
Qty: 1 BOX | Refills: 0 | Status: ON HOLD | OUTPATIENT
Start: 2020-07-30 | End: 2020-08-12 | Stop reason: SDUPTHER

## 2020-07-30 RX ORDER — IPRATROPIUM BROMIDE AND ALBUTEROL SULFATE 2.5; .5 MG/3ML; MG/3ML
3 SOLUTION RESPIRATORY (INHALATION)
Qty: 1 BOX | Refills: 0 | Status: ON HOLD | OUTPATIENT
Start: 2020-07-30 | End: 2020-08-12 | Stop reason: HOSPADM

## 2020-07-30 RX ORDER — LISINOPRIL 20 MG/1
20 TABLET ORAL DAILY
Qty: 90 TABLET | Refills: 3 | Status: ON HOLD | OUTPATIENT
Start: 2020-07-31 | End: 2020-08-12 | Stop reason: HOSPADM

## 2020-07-30 RX ORDER — LINEZOLID 600 MG/1
600 TABLET, FILM COATED ORAL EVERY 12 HOURS
Qty: 14 TABLET | Refills: 0
Start: 2020-07-30 | End: 2020-08-06

## 2020-07-30 RX ORDER — PANTOPRAZOLE SODIUM 40 MG/1
40 TABLET, DELAYED RELEASE ORAL
Qty: 30 TABLET | Refills: 11 | OUTPATIENT
Start: 2020-07-31 | End: 2021-07-31

## 2020-07-30 RX ADMIN — HYDROCODONE BITARTRATE AND ACETAMINOPHEN 1 TABLET: 10; 325 TABLET ORAL at 10:07

## 2020-07-30 RX ADMIN — ASPIRIN 81 MG: 81 TABLET, COATED ORAL at 10:07

## 2020-07-30 RX ADMIN — HYDROCODONE BITARTRATE AND ACETAMINOPHEN 1 TABLET: 10; 325 TABLET ORAL at 04:07

## 2020-07-30 RX ADMIN — LINEZOLID 600 MG: 600 INJECTION, SOLUTION INTRAVENOUS at 10:07

## 2020-07-30 RX ADMIN — ISOSORBIDE MONONITRATE 60 MG: 30 TABLET, EXTENDED RELEASE ORAL at 10:07

## 2020-07-30 RX ADMIN — ENOXAPARIN SODIUM 40 MG: 40 INJECTION SUBCUTANEOUS at 04:07

## 2020-07-30 RX ADMIN — LISINOPRIL 20 MG: 20 TABLET ORAL at 10:07

## 2020-07-30 RX ADMIN — PANTOPRAZOLE SODIUM 40 MG: 40 TABLET, DELAYED RELEASE ORAL at 05:07

## 2020-07-30 RX ADMIN — METOPROLOL SUCCINATE 50 MG: 50 TABLET, EXTENDED RELEASE ORAL at 10:07

## 2020-07-30 RX ADMIN — IPRATROPIUM BROMIDE AND ALBUTEROL SULFATE 3 ML: .5; 3 SOLUTION RESPIRATORY (INHALATION) at 07:07

## 2020-07-30 RX ADMIN — IPRATROPIUM BROMIDE AND ALBUTEROL SULFATE 3 ML: .5; 3 SOLUTION RESPIRATORY (INHALATION) at 01:07

## 2020-07-30 NOTE — NURSING
7520  REPORT CALLED TO OH RIVERA AT [facility] IN [place].  PATIENT WILL BE GOING TO THEIR FACILITY VIA [facility].  SALINE-LOCK  D/C'D WITH CATHETER INTACT.  TELEMETRY D/C'D AS WELL.  [facility] NOTIFIED OF NEED FOR TRANSPORT.   YOSELIN HE LPN

## 2020-07-30 NOTE — ASSESSMENT & PLAN NOTE
7/28/20 LF patient with myopathy secondary to acute illness. Continue PT/OT  7/29/20 LF consult for SNF at [facility]  7/30/20LF discharge to SNF at [facility] today

## 2020-07-30 NOTE — NURSING
1750  PATIENT STILL WAITING ON [facility] TO TRANSPORT HER TO [facility].  DENIES C/O PAIN AT THIS TIME.  PAIN MEDS EFFECTIVE.   YOSELIN HE, EUGENION

## 2020-07-30 NOTE — PLAN OF CARE
07/30/20 1403   Final Note   Assessment Type Final Discharge Note   Anticipated Discharge Disposition SNF  ([facility]       New orders for patient to discharge to [facility] in . Patient agrees with discharge. Verbal consent obtained for second signature on IM. Copy given to patient. Discharge orders faxed to Cynthia with [facility]. Primary Nurse, Fina, to call report. Dr. Fuller to follow at nursing home. Patient needs an ortho referral. Spoke to patient who would like to see Dr. Caputo in [place]. Called his office and had to leave a voicemail. Awaiting a call back. Notified Cynthia with [facility] of this and asked her that if Dr. Ray's office doesn't call me back could she make sure that the patient gets an ortho appointment. She says she will take care of it. No other discharge needs.

## 2020-07-30 NOTE — PT/OT/SLP PROGRESS
Occupational Therapy      Patient Name:  Martina Betancourt   MRN:  [id number]    Patient not seen today secondary to Patient fatigue, Pain, Patient unwilling to participate. Will follow-up 7/30/20. Spoke with , she reported that patient has been approved for SNF placement and is planned to transfer out today. Waiting on RT to change pt from ventimask to nasal canula to make sure pt can tolerate nasal canula with good oxygenation. Went in to treat pt, she was supine in bed with ventimask on and family at BS. She was a bit agitated. Reporting that she had not yet had her pain meds and she was not doing any therapy until she was given pain meds. Pt also reported that she was being transferred to the SNF so she was leaving anyways, so she didn't need to do any therapy now because she was drained. Told her therapy will come back later to try again and check on her if she has not yet left.    Marleny Randolph, OT

## 2020-07-30 NOTE — ASSESSMENT & PLAN NOTE
This is further complicated by a loculated pleural effusion status post CT-guided thoracentesis.  Patient doing well postop.  Cultures pending.    7/28/20 LF Patients pleural drain was dislodged, general surgery following. NO respiratory distress. Will get chest xray today and discuss case and plans; WBC normal, afebrile      7/29/20 LF xray with improvement plan to complete zyvox #7/14 7/30/20 LF xray with improvement plan to complete zyvox #8/14, will get outpatient xray in one month

## 2020-07-30 NOTE — ASSESSMENT & PLAN NOTE
Continues Zyvox at current dosing    7/28/20 LF repeat blood cultures negative, continue zyvox #6  7/29/20 LF complete zyvox #7/14 7/30/20 LF patient to complete 14 day course of zyvox--change IV to PO to complete

## 2020-07-30 NOTE — ASSESSMENT & PLAN NOTE
Status post splint placement.  Defer further management to primary team.    7/28/20 patient s/p fall 7/15/20 with right medial malleolus and distal tibial fracture--continue splint plan to follow up outpatient with ortho  7/29/20  plan to follow up with ortho outpatient  7/30/20  continue splint and weight bearing restrictions--needs referral to ortho in 1-2 weeks

## 2020-07-30 NOTE — DISCHARGE SUMMARY
[facility] Medicine  Discharge Summary      Patient Name: Martina Betancourt  MRN: [id number]  Admission Date: 7/10/2020  Hospital Length of Stay: 20 days  Discharge Date and Time:  07/30/2020 1:14 PM  Attending Physician: Joe Fuller III, MD   Discharging Provider: Livia Nettles NP  Primary Care Provider: Joe Fuller Iii, MD      HPI:   The patient is a 69-year-old female who presented to our facility with severe sepsis secondary to MRSA bacteremia.    * No surgery found *      Hospital Course:   Patient progressed fairly well throughout her hospital course thus far.  This was complicated by community-acquired pneumonia with loculated pleural effusion.  She required CT-guided paracentesis.    7/27/20  -  today the patient is without any new complaints.  I am unsure the volume of fluid that was removed but we need repeat imaging studies and to discuss with General surgery whether or not further surgical into in intervention is needed for her pleural effusion.    7/28/20 LF discussed with nurse pleural drain was dislodged on yesterday, General surgery to address today  7/29/20 LF family at bedside discussed condition and discharge plans. Patient and daughter both agree that she needs SNF. Have decided on [facility]. Patient not a candidate for inpatient rehab but due to lack of mobility at home patient on a normal basis uses a [employer]  7/30/20 LF patient is stable able to wean oxygen from VM to 2 liters N/C, COVID test was negative, patient was approved for SNF will d/c to [facility] today     Consults:   Consults (From admission, onward)        Status Ordering Provider     Inpatient consult to General Surgery  Once     Provider:  Park Ortiz MD    Acknowledged ORDER, ABSTRACTED     Inpatient consult to Social Work/Case Management  Once     Provider:  (Not yet assigned)    Acknowledged LIVIA NETTLES          * Community acquired pneumonia  This is further complicated by a loculated  pleural effusion status post CT-guided thoracentesis.  Patient doing well postop.  Cultures pending.    7/28/20  Patients pleural drain was dislodged, general surgery following. NO respiratory distress. Will get chest xray today and discuss case and plans; WBC normal, afebrile      7/29/20 LF xray with improvement plan to complete zyvox #7/14  7/30/20 LF xray with improvement plan to complete zyvox #8/14, will get outpatient xray in one month    Myopathy  7/28/20  patient with myopathy secondary to acute illness. Continue PT/OT  7/29/20  consult for SNF at [facility]  7/30/20LF discharge to SNF at [facility] today    Do not resuscitate  7/28/20 LF per la post  7/29/20 LF per la post  7/30/20 LF per la post      Closed high lateral malleolus fracture, right, with delayed healing, subsequent encounter  Status post splint placement.  Defer further management to primary team.    7/28/20 patient s/p fall 7/15/20 with right medial malleolus and distal tibial fracture--continue splint plan to follow up outpatient with ortho  7/29/20  plan to follow up with ortho outpatient  7/30/20  continue splint and weight bearing restrictions--needs referral to ortho in 1-2 weeks      Dementia without behavioral disturbance  Patient appears to be at baseline.  7/28/20  baseline  7/29/20  baseline  7/30/20  at baseline will resume meds on discharge    Pleural effusion  Status post thoracentesis.  Doing well postop thus far.    7/28/20  patient's pleural drain was dislodged, general surgery following.  Will get chest x-ray today    7/29/20  xray with improvement    7/30/20  will monitor condition at SNF, plan to repeat xray in one month    MRSA bacteremia  Continues Zyvox at current dosing    7/28/20  repeat blood cultures negative, continue zyvox #6  7/29/20 LF complete zyvox #7/14  7/30/20  patient to complete 14 day course of zyvox--change IV to PO to complete        Final Active Diagnoses:    Diagnosis Date  Noted POA    PRINCIPAL PROBLEM:  Community acquired pneumonia [J18.9] 07/26/2020 Yes    Myopathy [G72.9] 07/28/2020 No    Pleural effusion [J90] 07/26/2020 Yes    MRSA bacteremia [R78.81] 07/26/2020 Yes    Closed high lateral malleolus fracture, right, with delayed healing, subsequent encounter [S82.61XG] 07/26/2020 Not Applicable    Do not resuscitate [Z66] 07/26/2020 Yes      Problems Resolved During this Admission:    Diagnosis Date Noted Date Resolved POA    Severe sepsis [A41.9, R65.20] 07/25/2020 07/30/2020 Yes       Discharged Condition: good    Disposition: Skilled Nursing Facility    Follow Up:   Contact information for follow-up providers     Joe Fuller Iii, MD In 2 weeks.    Specialty: Internal Medicine  Why: Dr Fuller to follow at [facility]  Contact information:  [address] [facility] [address]  [phone]                   Contact information for after-discharge care     Destination     [facility] [place].    Service: Skilled Nursing  Contact information:  [phone]                           Patient Instructions:      Comprehensive metabolic panel   Standing Status: Future Standing Exp. Date: 09/28/21     CBC auto differential   Standing Status: Future Standing Exp. Date: 09/28/21     Magnesium   Standing Status: Future Standing Exp. Date: 09/28/21     Ambulatory referral/consult to Orthopedics   Standing Status: Future   Referral Priority: Routine Referral Type: Consultation   Requested Specialty: Orthopedic Surgery   Number of Visits Requested: 1     Diet Cardiac     Activity as tolerated     Weight bearing restrictions (specify):   Order Comments: Can not bear weight on right leg until seen per ortho       Significant Diagnostic Studies: Labs: All labs within the past 24 hours have been reviewed  Microbiology:   Blood Culture   Lab Results   Component Value Date    LABBLOO  07/10/2020      ========================================================================================================================    LABBLOO  07/10/2020     Erythromycin                     <=0.5          S                                                                           LABBLOO  07/10/2020     Gentamicin                       <=4            S                                                                           LABBLOO  07/10/2020     Levofloxacin                     <=1            S                                                                           LABBLOO  07/10/2020     Linezolid                        4              S                                                                           LABBLOO  07/10/2020     Moxifloxacin                     <=0.5          S                                                                           LABBLOO  07/10/2020     Oxacillin                        >2             R                                                                           LABBLOO  07/10/2020     Penicillin                       >8          R*                                                                             LABBLOO  07/10/2020     Rifampin                         <=1            S                                                                           LABBLOO  07/10/2020     Tetracycline                     <=4            S                                                                           LABBLOO  07/10/2020     Trimeth/Sulfa                    <=0.5/9.5      S                                                                           LABBLOO  07/10/2020     Specimen NO.:   [id number]          Exam Status:  ** Final **           Procedure:  CULTURE BLOOD SET #1                       LABBLOO  07/10/2020     Cefoxitin Screen                 >4          POS                                                                            LABBLOO  07/10/2020     Ceftaroline                       <=0.5          S                                                                           LABBLOO  07/10/2020     Vancomycin                       1              S                                                                           LABBLOO  07/10/2020     ========================================================================================================================    [facility]  07/10/2020     Iso/Result: 01 MRSA                                                                                                         [facility]  07/10/2020     Antimicrobic/Dose                RUBY        Systemic  Urine                                                                 LABOO  07/10/2020     _________________                ___        ________  _____                                                                 [facility]BLOO  07/10/2020     Ciprofloxacin                    <=1            S                                                                           [facility]BLOO  07/10/2020     Clindamycin                      <=0.5          S                                                                           [facility]BLOO  07/10/2020     Daptomycin                       <=0.5          S                                                                           and Wound Culture: negative  Radiology: X-Ray: CXR: X-Ray Chest 1 View (CXR):   Results for orders placed or performed during the hospital encounter of 07/10/20   X-Ray Chest 1 View    Narrative    EXAMINATION:  XR CHEST 1 VIEW    CLINICAL HISTORY:  pneumonia; dislodge of pleural drain;    COMPARISON:  07/22/2020    FINDINGS:  Cardiac silhouette is unchanged.  The left pleural effusion appears improved with a small amount of residual pleural fluid suspected.  The bilateral perihilar infiltrates have improved as well.      Impression    Interval improvement in the left pleural effusion and bilateral perihilar infiltrates.      Electronically signed by: Sunny  MD Jamison  Date:    07/28/2020  Time:    13:08       Pending Diagnostic Studies:     None         Medications:  Reconciled Home Medications:      Medication List      START taking these medications    acetaminophen 325 MG tablet  Commonly known as: TYLENOL  Take 2 tablets (650 mg total) by mouth every 6 (six) hours as needed (HEADACHE, TEMPERATURE - FEVER > 100.4).     * albuterol-ipratropium 2.5 mg-0.5 mg/3 mL nebulizer solution  Commonly known as: DUO-NEB  Take 3 mLs by nebulization 3 (three) times daily. Rescue     * albuterol-ipratropium 2.5 mg-0.5 mg/3 mL nebulizer solution  Commonly known as: DUO-NEB  Take 3 mLs by nebulization every 2 (two) hours as needed for Wheezing or Shortness of Breath. Rescue     HYDROcodone-acetaminophen  mg per tablet  Commonly known as: NORCO  Take 1 tablet by mouth every 4 (four) hours as needed.     linezolid 600 mg Tab  Commonly known as: ZYVOX  Take 1 tablet (600 mg total) by mouth every 12 (twelve) hours. for 7 days     pantoprazole 40 MG tablet  Commonly known as: PROTONIX  Take 1 tablet (40 mg total) by mouth before breakfast.  Start taking on: July 31, 2020         * This list has 2 medication(s) that are the same as other medications prescribed for you. Read the directions carefully, and ask your doctor or other care provider to review them with you.            CHANGE how you take these medications    lisinopriL 20 MG tablet  Commonly known as: PRINIVIL,ZESTRIL  Take 1 tablet (20 mg total) by mouth once daily.  Start taking on: July 31, 2020  What changed:   · medication strength  · how much to take     metoprolol succinate 50 MG 24 hr tablet  Commonly known as: TOPROL-XL  Take 50 mg by mouth once daily.  What changed: Another medication with the same name was removed. Continue taking this medication, and follow the directions you see here.        CONTINUE taking these medications    aspirin 81 MG EC tablet  Commonly known as: ECOTRIN  Take 81 mg by mouth once daily.      donepeziL 10 MG tablet  Commonly known as: ARICEPT  Take 10 mg by mouth every evening.     DULoxetine 60 MG capsule  Commonly known as: CYMBALTA  Take 60 mg by mouth once daily.     ferrous sulfate 324 mg (65 mg iron) Tbec  Take 325 mg by mouth every evening.     isosorbide mononitrate 60 MG 24 hr tablet  Commonly known as: IMDUR  Take 60 mg by mouth once daily.     memantine 28 mg Cspx  Commonly known as: NAMENDA XR  Take 28 mg by mouth once daily.     pravastatin 40 MG tablet  Commonly known as: PRAVACHOL  Take 40 mg by mouth every evening.        STOP taking these medications    amLODIPine 10 MG tablet  Commonly known as: NORVASC     baclofen 10 MG tablet  Commonly known as: LIORESAL     baclofen 20 MG tablet  Commonly known as: LIORESAL     famotidine 20 MG tablet  Commonly known as: PEPCID     gabapentin 600 MG tablet  Commonly known as: NEURONTIN     hydrOXYzine 50 MG tablet  Commonly known as: ATARAX     isosorbide dinitrate 20 MG tablet  Commonly known as: ISORDIL     niacin 500 MG Tab     rOPINIRole 1 MG tablet  Commonly known as: REQUIP     spironolactone 25 MG tablet  Commonly known as: ALDACTONE     sucralfate 1 gram tablet  Commonly known as: CARAFATE            Indwelling Lines/Drains at time of discharge:   Lines/Drains/Airways     None                 Time spent on the discharge of patient: 30 minutes  Patient was seen and examined on the date of discharge and determined to be suitable for discharge.         Livia Earl NP  Department of Hospital Medicine  [facility]

## 2020-07-30 NOTE — PLAN OF CARE
07/30/20 0924   Post-Acute Status   Post-Acute Authorization Placement   Post-Acute Placement Status Set-up Complete  (Recieved approval for patient to go to [facility] yesterday afternoon. Spoke with Livia Earl NP who says that we will discharge on 7/30/2020.)

## 2020-07-30 NOTE — ASSESSMENT & PLAN NOTE
Patient appears to be at baseline.  7/28/20 LF baseline  7/29/20 LF baseline  7/30/20 LF at baseline will resume meds on discharge

## 2020-07-30 NOTE — ASSESSMENT & PLAN NOTE
Status post thoracentesis.  Doing well postop thus far.    7/28/20  patient's pleural drain was dislodged, general surgery following.  Will get chest x-ray today    7/29/20  xray with improvement    7/30/20  will monitor condition at [facility], plan to repeat xray in one month

## 2020-07-31 NOTE — NURSING
1915   [facility] HERE TO TRANSPORT PATIENT TO [facility].  PATIENT LEFT FLOOR IN STABLE CONDITION PER STRETCHER WITH [facility] PERSONNEL IN ATTENDANCE.  YOSELIN HE, EUGENION

## 2020-08-09 ENCOUNTER — HOSPITAL ENCOUNTER (EMERGENCY)
Facility: HOSPITAL | Age: 70
End: 2020-08-10
Attending: EMERGENCY MEDICINE
Payer: MEDICARE

## 2020-08-09 DIAGNOSIS — I21.4 NON-ST ELEVATION MYOCARDIAL INFARCTION (NSTEMI): Primary | ICD-10-CM

## 2020-08-09 DIAGNOSIS — R06.02 SOB (SHORTNESS OF BREATH): ICD-10-CM

## 2020-08-09 DIAGNOSIS — R09.02 HYPOXIA: ICD-10-CM

## 2020-08-09 LAB
ALBUMIN SERPL BCP-MCNC: 2 G/DL (ref 3.5–5.2)
ALP SERPL-CCNC: 115 U/L (ref 55–135)
ALT SERPL W/O P-5'-P-CCNC: 23 U/L (ref 10–44)
ANION GAP SERPL CALC-SCNC: 10 MMOL/L (ref 8–16)
AST SERPL-CCNC: 25 U/L (ref 10–40)
BASOPHILS # BLD AUTO: 0.02 K/UL (ref 0–0.2)
BASOPHILS NFR BLD: 0.3 % (ref 0–1.9)
BILIRUB SERPL-MCNC: 0.4 MG/DL (ref 0.1–1)
BUN SERPL-MCNC: 14 MG/DL (ref 8–23)
CALCIUM SERPL-MCNC: 9.7 MG/DL (ref 8.7–10.5)
CHLORIDE SERPL-SCNC: 97 MMOL/L (ref 95–110)
CO2 SERPL-SCNC: 30 MMOL/L (ref 23–29)
CREAT SERPL-MCNC: 1.1 MG/DL (ref 0.5–1.4)
D DIMER PPP IA.FEU-MCNC: >33 MG/L FEU
DIFFERENTIAL METHOD: ABNORMAL
EOSINOPHIL # BLD AUTO: 0 K/UL (ref 0–0.5)
EOSINOPHIL NFR BLD: 0.4 % (ref 0–8)
ERYTHROCYTE [DISTWIDTH] IN BLOOD BY AUTOMATED COUNT: 16 % (ref 11.5–14.5)
EST. GFR  (AFRICAN AMERICAN): 58.4 ML/MIN/1.73 M^2
EST. GFR  (NON AFRICAN AMERICAN): 50.6 ML/MIN/1.73 M^2
GLUCOSE SERPL-MCNC: 108 MG/DL (ref 70–110)
HCT VFR BLD AUTO: 37.3 % (ref 37–48.5)
HGB BLD-MCNC: 11 G/DL (ref 12–16)
IMM GRANULOCYTES # BLD AUTO: 0.05 K/UL (ref 0–0.04)
IMM GRANULOCYTES NFR BLD AUTO: 0.7 % (ref 0–0.5)
INR PPP: 1.2 (ref 0.8–1.2)
LACTATE SERPL-SCNC: 3.2 MMOL/L (ref 0.5–2.2)
LYMPHOCYTES # BLD AUTO: 0.3 K/UL (ref 1–4.8)
LYMPHOCYTES NFR BLD: 4.4 % (ref 18–48)
MAGNESIUM SERPL-MCNC: 1.7 MG/DL (ref 1.6–2.6)
MCH RBC QN AUTO: 28.1 PG (ref 27–31)
MCHC RBC AUTO-ENTMCNC: 29.5 G/DL (ref 32–36)
MCV RBC AUTO: 95 FL (ref 82–98)
MONOCYTES # BLD AUTO: 0 K/UL (ref 0.3–1)
MONOCYTES NFR BLD: 0.6 % (ref 4–15)
NEUTROPHILS # BLD AUTO: 6.6 K/UL (ref 1.8–7.7)
NEUTROPHILS NFR BLD: 93.6 % (ref 38–73)
NRBC BLD-RTO: 0 /100 WBC
NT-PROBNP: 729 PG/ML (ref 5–900)
PLATELET # BLD AUTO: 363 K/UL (ref 150–350)
PMV BLD AUTO: 8.6 FL (ref 9.2–12.9)
POTASSIUM SERPL-SCNC: 4.2 MMOL/L (ref 3.5–5.1)
PROT SERPL-MCNC: 7.8 G/DL (ref 6–8.4)
PROTHROMBIN TIME: 12.5 SEC (ref 9–12.5)
RBC # BLD AUTO: 3.92 M/UL (ref 4–5.4)
SARS-COV-2 RDRP RESP QL NAA+PROBE: NEGATIVE
SODIUM SERPL-SCNC: 137 MMOL/L (ref 136–145)
TROPONIN I SERPL DL<=0.01 NG/ML-MCNC: 0.14 NG/ML (ref 0–0.03)
TROPONIN I SERPL DL<=0.01 NG/ML-MCNC: 0.88 NG/ML (ref 0–0.03)
TROPONIN I SERPL DL<=0.01 NG/ML-MCNC: 1.66 NG/ML (ref 0–0.03)
TSH SERPL DL<=0.005 MIU/L-ACNC: 1.04 UIU/ML (ref 0.4–4)
WBC # BLD AUTO: 7.06 K/UL (ref 3.9–12.7)

## 2020-08-09 PROCEDURE — 85610 PROTHROMBIN TIME: CPT

## 2020-08-09 PROCEDURE — 99900035 HC TECH TIME PER 15 MIN (STAT)

## 2020-08-09 PROCEDURE — 80053 COMPREHEN METABOLIC PANEL: CPT

## 2020-08-09 PROCEDURE — 96374 THER/PROPH/DIAG INJ IV PUSH: CPT

## 2020-08-09 PROCEDURE — 27000190 HC CPAP FULL FACE MASK W/VALVE

## 2020-08-09 PROCEDURE — 25000242 PHARM REV CODE 250 ALT 637 W/ HCPCS: Performed by: EMERGENCY MEDICINE

## 2020-08-09 PROCEDURE — 83880 ASSAY OF NATRIURETIC PEPTIDE: CPT

## 2020-08-09 PROCEDURE — 93010 EKG 12-LEAD: ICD-10-PCS | Mod: ,,, | Performed by: INTERNAL MEDICINE

## 2020-08-09 PROCEDURE — 96372 THER/PROPH/DIAG INJ SC/IM: CPT | Mod: 59

## 2020-08-09 PROCEDURE — 94640 AIRWAY INHALATION TREATMENT: CPT

## 2020-08-09 PROCEDURE — 36415 COLL VENOUS BLD VENIPUNCTURE: CPT

## 2020-08-09 PROCEDURE — U0002 COVID-19 LAB TEST NON-CDC: HCPCS

## 2020-08-09 PROCEDURE — 84443 ASSAY THYROID STIM HORMONE: CPT

## 2020-08-09 PROCEDURE — 94660 CPAP INITIATION&MGMT: CPT

## 2020-08-09 PROCEDURE — 84484 ASSAY OF TROPONIN QUANT: CPT

## 2020-08-09 PROCEDURE — 25500020 PHARM REV CODE 255: Performed by: EMERGENCY MEDICINE

## 2020-08-09 PROCEDURE — 27000221 HC OXYGEN, UP TO 24 HOURS

## 2020-08-09 PROCEDURE — 99285 EMERGENCY DEPT VISIT HI MDM: CPT | Mod: 25

## 2020-08-09 PROCEDURE — 87040 BLOOD CULTURE FOR BACTERIA: CPT | Mod: 59

## 2020-08-09 PROCEDURE — 63600175 PHARM REV CODE 636 W HCPCS: Performed by: EMERGENCY MEDICINE

## 2020-08-09 PROCEDURE — 99900031 HC PATIENT EDUCATION (STAT)

## 2020-08-09 PROCEDURE — 84484 ASSAY OF TROPONIN QUANT: CPT | Mod: 91

## 2020-08-09 PROCEDURE — 99900029 HC O2 SETUP (STAT)

## 2020-08-09 PROCEDURE — 83605 ASSAY OF LACTIC ACID: CPT

## 2020-08-09 PROCEDURE — 83735 ASSAY OF MAGNESIUM: CPT

## 2020-08-09 PROCEDURE — 85025 COMPLETE CBC W/AUTO DIFF WBC: CPT

## 2020-08-09 PROCEDURE — 25000003 PHARM REV CODE 250: Performed by: EMERGENCY MEDICINE

## 2020-08-09 PROCEDURE — 93005 ELECTROCARDIOGRAM TRACING: CPT

## 2020-08-09 PROCEDURE — 94761 N-INVAS EAR/PLS OXIMETRY MLT: CPT

## 2020-08-09 PROCEDURE — 85379 FIBRIN DEGRADATION QUANT: CPT

## 2020-08-09 PROCEDURE — 93010 ELECTROCARDIOGRAM REPORT: CPT | Mod: ,,, | Performed by: INTERNAL MEDICINE

## 2020-08-09 RX ORDER — ASPIRIN 325 MG
325 TABLET ORAL
Status: COMPLETED | OUTPATIENT
Start: 2020-08-09 | End: 2020-08-09

## 2020-08-09 RX ORDER — ENOXAPARIN SODIUM 100 MG/ML
100 INJECTION SUBCUTANEOUS
Status: COMPLETED | OUTPATIENT
Start: 2020-08-09 | End: 2020-08-09

## 2020-08-09 RX ORDER — METOPROLOL TARTRATE 1 MG/ML
2.5 INJECTION, SOLUTION INTRAVENOUS
Status: COMPLETED | OUTPATIENT
Start: 2020-08-09 | End: 2020-08-09

## 2020-08-09 RX ORDER — IPRATROPIUM BROMIDE AND ALBUTEROL SULFATE 2.5; .5 MG/3ML; MG/3ML
3 SOLUTION RESPIRATORY (INHALATION)
Status: COMPLETED | OUTPATIENT
Start: 2020-08-09 | End: 2020-08-09

## 2020-08-09 RX ADMIN — METOPROLOL TARTRATE 2.5 MG: 5 INJECTION, SOLUTION INTRAVENOUS at 11:08

## 2020-08-09 RX ADMIN — ENOXAPARIN SODIUM 100 MG: 100 INJECTION SUBCUTANEOUS at 08:08

## 2020-08-09 RX ADMIN — ASPIRIN 325 MG ORAL TABLET 325 MG: 325 PILL ORAL at 08:08

## 2020-08-09 RX ADMIN — IPRATROPIUM BROMIDE AND ALBUTEROL SULFATE 3 ML: .5; 3 SOLUTION RESPIRATORY (INHALATION) at 05:08

## 2020-08-09 RX ADMIN — IOHEXOL 100 ML: 350 INJECTION, SOLUTION INTRAVENOUS at 09:08

## 2020-08-09 NOTE — ED PROVIDER NOTES
Encounter Date: 8/9/2020       History     Chief Complaint   Patient presents with    Shortness of Breath     Initial sats @ the nursing home <50%, en route [facility] sats up to 82%.       This is a 69-year-old white female from the nursing home history of Alzheimer's disease coronary disease hyperlipidemia hypertension obesity and sleep apnea.  Presents with shortness of breath that began today for the nursing home staff.  Has a remote history of COVID positivity and then subsequent negatives patient afebrile here.  EMS reports sats were in the 50s when they got the nursing home.  A breathing treatment was given the nursing home and placed on 100% non-rebreather by EMS.  Patient is a DNR and is a patient of Dr. Fuller        Review of patient's allergies indicates:   Allergen Reactions    Hydroxyzine     Tizanidine      Past Medical History:   Diagnosis Date    Alzheimer disease     Alzheimer disease     Coronary artery disease     Hyperlipidemia     Hypertension     Obesity     Sleep apnea      Past Surgical History:   Procedure Laterality Date    APPENDECTOMY      APPENDECTOMY      CHOLECYSTECTOMY      CORONARY STENT PLACEMENT      HYSTERECTOMY       History reviewed. No pertinent family history.  Social History     Tobacco Use    Smoking status: Unknown If Ever Smoked   Substance Use Topics    Alcohol use: Not on file    Drug use: Not on file     Review of Systems   Constitutional: Negative for fever.        Obese   HENT: Negative for congestion.    Eyes: Negative for discharge.   Respiratory: Positive for cough, shortness of breath and wheezing. Negative for apnea.    Cardiovascular: Negative for chest pain, palpitations and leg swelling.   Gastrointestinal: Negative for abdominal distention.   All other systems reviewed and are negative.      Physical Exam     Initial Vitals   BP Pulse Resp Temp SpO2   08/09/20 1712 08/09/20 1712 08/09/20 1712 08/09/20 1717 08/09/20 1712   (!) 131/59 101 (!) 42 98.5  °F (36.9 °C) (!) 94 %      MAP       --                Physical Exam    Nursing note and vitals reviewed.  Constitutional: She appears well-developed and well-nourished. She is not diaphoretic. No distress.   Obese   HENT:   Head: Normocephalic and atraumatic.   Eyes: Conjunctivae and EOM are normal. Pupils are equal, round, and reactive to light.   Neck: Normal range of motion. Neck supple.   Cardiovascular: Normal rate, regular rhythm, normal heart sounds and intact distal pulses.   No murmur heard.  Pulmonary/Chest: Breath sounds normal. No respiratory distress. She has no wheezes. She has no rhonchi. She has no rales. She exhibits no tenderness.   Mild respiratory distress on arrival.  Tachypneic   Abdominal: Soft. Bowel sounds are normal.   Musculoskeletal: Normal range of motion. No tenderness or edema.   Neurological: She is alert and oriented to person, place, and time. She has normal strength and normal reflexes. No cranial nerve deficit. GCS eye subscore is 4. GCS verbal subscore is 5. GCS motor subscore is 6.   Skin: Skin is warm and dry. Capillary refill takes less than 2 seconds.         ED Course   Procedures  Labs Reviewed   CBC W/ AUTO DIFFERENTIAL - Abnormal; Notable for the following components:       Result Value    RBC 3.92 (*)     Hemoglobin 11.0 (*)     Mean Corpuscular Hemoglobin Conc 29.5 (*)     RDW 16.0 (*)     Platelets 363 (*)     MPV 8.6 (*)     Immature Granulocytes 0.7 (*)     Immature Grans (Abs) 0.05 (*)     Lymph # 0.3 (*)     Mono # 0.0 (*)     Gran% 93.6 (*)     Lymph% 4.4 (*)     Mono% 0.6 (*)     All other components within normal limits   COMPREHENSIVE METABOLIC PANEL - Abnormal; Notable for the following components:    CO2 30 (*)     Albumin 2.0 (*)     eGFR if  58.4 (*)     eGFR if non  50.6 (*)     All other components within normal limits   D DIMER, QUANTITATIVE - Abnormal; Notable for the following components:    D-Dimer >33.00 (*)     All  other components within normal limits   LACTIC ACID, PLASMA - Abnormal; Notable for the following components:    Lactate (Lactic Acid) 3.2 (*)     All other components within normal limits   TROPONIN I - Abnormal; Notable for the following components:    Troponin I 0.142 (*)     All other components within normal limits   TROPONIN I - Abnormal; Notable for the following components:    Troponin I 0.877 (*)     All other components within normal limits   TROPONIN I - Abnormal; Notable for the following components:    Troponin I 1.660 (*)     All other components within normal limits   CULTURE, BLOOD   CULTURE, BLOOD   SARS-COV-2 RNA AMPLIFICATION, QUAL   MAGNESIUM   PROTIME-INR   TSH   NT-PRO NATRIURETIC PEPTIDE     EKG Readings: (Independently Interpreted)   Initial Reading: No STEMI. Rhythm: Normal Sinus Rhythm. Heart Rate: 100. Ectopy: No Ectopy. Conduction: Normal. ST Segments: Non-Specific ST Segment Depression. ST Segment Depression: V4, V5 and V6. T Waves: Normal. Axis: Normal. Clinical Impression: Normal Sinus Rhythm     ECG Results          EKG 12-lead (Final result)  Result time 08/09/20 20:49:54    Final result by Interface, Lab In [facility] (08/09/20 20:49:54)                 Narrative:    Test Reason : R06.02,    Vent. Rate : 100 BPM     Atrial Rate : 100 BPM     P-R Int : 158 ms          QRS Dur : 092 ms      QT Int : 304 ms       P-R-T Axes : 053 020 117 degrees     QTc Int : 392 ms    Normal sinus rhythm Baseline Artifact  ST and/or T wave abnormalities suggesting myocardial ischemia Lateral leads  Abnormal ECG  No previous ECGs available  Confirmed by SP HAY MD ([id number]) on 8/9/2020 8:49:41 PM    Referred By: [email or web address]   SELF           Confirmed By:SP HAY MD                            Imaging Results          CTA Chest Non-Coronary (PE Study) (In process)                X-Ray Chest 1 View (In process)                  Medical Decision Making:   ED Management:  Respiratory status has greatly improved  with BIPAP. SaO2 97% on FiO2 of 60%.  Pt resting comfortably.  She denies any current chest pain or chest pain prior to this epidsode.  She does have a cardiac history with stent placement however.  Initial troponin 0.1, repeat troponins 0.8 and 1.6.  ASA and lovenox given.  CTA chest demonstrates NAD and no PE                   ED Course as of Aug 10 0022   Sun Aug 09, 2020   1749 Chest x-ray obtained.  Actually appears better than the last chest x-ray at the end of July.  Some chronic issues, possible fluid versus infiltrate    [SD]   1818 Transfer of care to Dr. Oates.    [SD]   Mon Aug 10, 2020   0021 Discussed with Dr Avery at [facility] and will transfer for cardiology. Dx NSTEMI    [BK]      ED Course User Index  [BK] Mayco Oates MD  [SD] Bryce Rosado MD       Patient Condition: The patient has been stabilized such that, within reasonable medical probability, no material deterioration of the patient's condition or the condition of the unborn child([place]) is likely to result from transfer.  Reason for Transfer: Service(s) unavailable  Benefits of Transfer: cardiology  MD Certification: Patient examined and risks and benefits explained, Patient and/or family/representative verbalize understanding        Clinical Impression:       ICD-10-CM ICD-9-CM   1. Non-ST elevation myocardial infarction (NSTEMI)  I21.4 410.70   2. SOB (shortness of breath)  R06.02 786.05   3. Hypoxia  R09.02 799.02             ED Disposition Condition    Transfer to Another Facility Serious                          Mayco Oates MD  08/10/20 0022

## 2020-08-10 ENCOUNTER — HOSPITAL ENCOUNTER (INPATIENT)
Facility: HOSPITAL | Age: 70
LOS: 2 days | Discharge: NURSING, REHAB OR LONG-TERM CARE | DRG: 280 | End: 2020-08-12
Attending: INTERNAL MEDICINE | Admitting: INTERNAL MEDICINE
Payer: MEDICARE

## 2020-08-10 VITALS
HEIGHT: 70 IN | TEMPERATURE: 99 F | HEART RATE: 58 BPM | WEIGHT: 293 LBS | RESPIRATION RATE: 22 BRPM | OXYGEN SATURATION: 90 % | DIASTOLIC BLOOD PRESSURE: 57 MMHG | SYSTOLIC BLOOD PRESSURE: 106 MMHG | BODY MASS INDEX: 41.95 KG/M2

## 2020-08-10 DIAGNOSIS — R07.9 CHEST PAIN: ICD-10-CM

## 2020-08-10 DIAGNOSIS — J96.02 ACUTE RESPIRATORY FAILURE WITH HYPOXIA AND HYPERCARBIA: ICD-10-CM

## 2020-08-10 DIAGNOSIS — I21.4 NSTEMI (NON-ST ELEVATED MYOCARDIAL INFARCTION): ICD-10-CM

## 2020-08-10 DIAGNOSIS — S82.61XG: ICD-10-CM

## 2020-08-10 DIAGNOSIS — J96.01 ACUTE RESPIRATORY FAILURE WITH HYPOXIA AND HYPERCARBIA: ICD-10-CM

## 2020-08-10 DIAGNOSIS — I21.4 NSTEMI (NON-ST ELEVATION MYOCARDIAL INFARCTION): ICD-10-CM

## 2020-08-10 DIAGNOSIS — L89.611 PRESSURE INJURY OF RIGHT HEEL, STAGE 1: Primary | ICD-10-CM

## 2020-08-10 PROBLEM — G47.33 OBSTRUCTIVE SLEEP APNEA TREATED WITH CONTINUOUS POSITIVE AIRWAY PRESSURE (CPAP): Status: ACTIVE | Noted: 2020-08-10

## 2020-08-10 PROBLEM — E66.01 SEVERE OBESITY (BMI >= 40): Status: ACTIVE | Noted: 2020-08-10

## 2020-08-10 LAB
ALLENS TEST: ABNORMAL
ALLENS TEST: ABNORMAL
AORTIC ROOT ANNULUS: 3.69 CM
AORTIC VALVE CUSP SEPERATION: 2.39 CM
AV INDEX (PROSTH): 0.74
AV MEAN GRADIENT: 4 MMHG
AV PEAK GRADIENT: 8 MMHG
AV VALVE AREA: 2.88 CM2
AV VELOCITY RATIO: 0.87
BIPAP: ABNORMAL
BSA FOR ECHO PROCEDURE: 2.62 M2
CHOLEST SERPL-MCNC: 63 MG/DL (ref 120–199)
CHOLEST/HDLC SERPL: 3.3 {RATIO} (ref 2–5)
CORRECTED TEMPERATURE (PCO2): 51.9 MMHG
CORRECTED TEMPERATURE (PH): 7.41
CORRECTED TEMPERATURE (PO2): 51 MMHG
DELSYS: ABNORMAL
DELSYS: ABNORMAL
DOP CALC AO PEAK VEL: 1.43 M/S
DOP CALC AO VTI: 30.18 CM
DOP CALC LVOT AREA: 3.9 CM2
DOP CALC LVOT DIAMETER: 2.22 CM
DOP CALC LVOT PEAK VEL: 1.24 M/S
DOP CALC LVOT STROKE VOLUME: 86.78 CM3
DOP CALCLVOT PEAK VEL VTI: 22.43 CM
E WAVE DECELERATION TIME: 239.47 MSEC
E/A RATIO: 1.28
EP: 5
EP: 5
ERYTHROCYTE [SEDIMENTATION RATE] IN BLOOD BY WESTERGREN METHOD: 16 MM/H
FIO2: 50
FIO2: 50 %
FIO2: 60
HCO3 UR-SCNC: 30.8 MMOL/L
HCO3 UR-SCNC: 32 MMOL/L (ref 24–28)
HCO3 UR-SCNC: 32.5 MMOL/L (ref 24–28)
HDLC SERPL-MCNC: 19 MG/DL (ref 40–75)
HDLC SERPL: 30.2 % (ref 20–50)
IP: 12
IP: 14
IVRT: 94.58 MSEC
LA MAJOR: 4.52 CM
LDLC SERPL CALC-MCNC: 28.6 MG/DL (ref 63–159)
LEFT ATRIUM SIZE: 3.77 CM
Lab: ABNORMAL
MIN VOL: 8.5
MODE: ABNORMAL
MODE: ABNORMAL
MV PEAK A VEL: 0.85 M/S
MV PEAK E VEL: 1.09 M/S
MV STENOSIS PRESSURE HALF TIME: 82.59 MS
MV VALVE AREA P 1/2 METHOD: 2.66 CM2
NONHDLC SERPL-MCNC: 44 MG/DL
NOTIFIED BY: ABNORMAL
O2DEVICE: ABNORMAL
PCO2 BLDA: 51.6 MMHG (ref 35–45)
PCO2 BLDA: 51.9 MMHG (ref 35–45)
PCO2 BLDA: 53.5 MMHG (ref 35–45)
PH SMN: 7.39 [PH] (ref 7.35–7.45)
PH SMN: 7.4 [PH] (ref 7.35–7.45)
PH SMN: 7.41 [PH] (ref 7.34–7.45)
PISA TR MAX VEL: 1.58 M/S
PO2 BLDA: 51 MMHG (ref 80–100)
PO2 BLDA: 67 MMHG (ref 80–100)
PO2 BLDA: 83 MMHG (ref 80–100)
POC BASE DEFICIT: 8.3 MMOL/L
POC BE: 6 MMOL/L
POC BE: 6 MMOL/L
POC PERFORMED BY: ABNORMAL
POC SATURATED O2: 86.8 %
POC SATURATED O2: 92 % (ref 95–100)
POC SATURATED O2: 96 % (ref 95–100)
POC TCO2: 30.7 MMOL/L
POC TCO2: 34 MMOL/L (ref 23–27)
POC TCO2: 34 MMOL/L (ref 23–27)
POC TEMPERATURE: 37 C
PROVIDER NOTIFIED: ABNORMAL
PV PEAK VELOCITY: 0.84 CM/S
RA MAJOR: 5.06 CM
RA PRESSURE: 3 MMHG
RA WIDTH: 4.52 CM
SAMPLE: ABNORMAL
SAMPLE: ABNORMAL
SITE: ABNORMAL
SITE: ABNORMAL
SPECIMEN SOURCE: ABNORMAL
TR MAX PG: 10 MMHG
TRICUSPID ANNULAR PLANE SYSTOLIC EXCURSION: 1.86 CM
TRIGL SERPL-MCNC: 77 MG/DL (ref 30–150)
TROPONIN I SERPL DL<=0.01 NG/ML-MCNC: 0.43 NG/ML (ref 0–0.03)
TROPONIN I SERPL DL<=0.01 NG/ML-MCNC: 0.62 NG/ML (ref 0–0.03)
TROPONIN I SERPL DL<=0.01 NG/ML-MCNC: 1.05 NG/ML (ref 0–0.03)
TV REST PULMONARY ARTERY PRESSURE: 13 MMHG

## 2020-08-10 PROCEDURE — 94761 N-INVAS EAR/PLS OXIMETRY MLT: CPT

## 2020-08-10 PROCEDURE — 36600 WITHDRAWAL OF ARTERIAL BLOOD: CPT

## 2020-08-10 PROCEDURE — 84484 ASSAY OF TROPONIN QUANT: CPT | Mod: 91

## 2020-08-10 PROCEDURE — 99223 PR INITIAL HOSPITAL CARE,LEVL III: ICD-10-PCS | Mod: 25,,, | Performed by: INTERNAL MEDICINE

## 2020-08-10 PROCEDURE — 21400001 HC TELEMETRY ROOM

## 2020-08-10 PROCEDURE — 27000190 HC CPAP FULL FACE MASK W/VALVE

## 2020-08-10 PROCEDURE — 96375 TX/PRO/DX INJ NEW DRUG ADDON: CPT

## 2020-08-10 PROCEDURE — 93005 ELECTROCARDIOGRAM TRACING: CPT

## 2020-08-10 PROCEDURE — 25000003 PHARM REV CODE 250: Performed by: HOSPITALIST

## 2020-08-10 PROCEDURE — A4216 STERILE WATER/SALINE, 10 ML: HCPCS | Performed by: INTERNAL MEDICINE

## 2020-08-10 PROCEDURE — 97535 SELF CARE MNGMENT TRAINING: CPT

## 2020-08-10 PROCEDURE — 96374 THER/PROPH/DIAG INJ IV PUSH: CPT

## 2020-08-10 PROCEDURE — 99223 1ST HOSP IP/OBS HIGH 75: CPT | Mod: 25,,, | Performed by: INTERNAL MEDICINE

## 2020-08-10 PROCEDURE — 92610 EVALUATE SWALLOWING FUNCTION: CPT

## 2020-08-10 PROCEDURE — 93010 EKG 12-LEAD: ICD-10-PCS | Mod: ,,, | Performed by: INTERNAL MEDICINE

## 2020-08-10 PROCEDURE — 36415 COLL VENOUS BLD VENIPUNCTURE: CPT

## 2020-08-10 PROCEDURE — 82803 BLOOD GASES ANY COMBINATION: CPT

## 2020-08-10 PROCEDURE — 80061 LIPID PANEL: CPT

## 2020-08-10 PROCEDURE — 25000242 PHARM REV CODE 250 ALT 637 W/ HCPCS: Performed by: INTERNAL MEDICINE

## 2020-08-10 PROCEDURE — 94660 CPAP INITIATION&MGMT: CPT

## 2020-08-10 PROCEDURE — 94640 AIRWAY INHALATION TREATMENT: CPT

## 2020-08-10 PROCEDURE — 99900035 HC TECH TIME PER 15 MIN (STAT)

## 2020-08-10 PROCEDURE — 97161 PT EVAL LOW COMPLEX 20 MIN: CPT

## 2020-08-10 PROCEDURE — 63600175 PHARM REV CODE 636 W HCPCS: Performed by: INTERNAL MEDICINE

## 2020-08-10 PROCEDURE — 25000003 PHARM REV CODE 250: Performed by: INTERNAL MEDICINE

## 2020-08-10 PROCEDURE — 25000003 PHARM REV CODE 250: Performed by: EMERGENCY MEDICINE

## 2020-08-10 PROCEDURE — 97165 OT EVAL LOW COMPLEX 30 MIN: CPT

## 2020-08-10 PROCEDURE — 27000221 HC OXYGEN, UP TO 24 HOURS

## 2020-08-10 PROCEDURE — 93010 ELECTROCARDIOGRAM REPORT: CPT | Mod: ,,, | Performed by: INTERNAL MEDICINE

## 2020-08-10 PROCEDURE — 99223 PR INITIAL HOSPITAL CARE,LEVL III: ICD-10-PCS | Mod: ,,, | Performed by: ORTHOPAEDIC SURGERY

## 2020-08-10 PROCEDURE — 63600175 PHARM REV CODE 636 W HCPCS: Performed by: EMERGENCY MEDICINE

## 2020-08-10 PROCEDURE — 99223 1ST HOSP IP/OBS HIGH 75: CPT | Mod: ,,, | Performed by: ORTHOPAEDIC SURGERY

## 2020-08-10 RX ORDER — CLOPIDOGREL BISULFATE 75 MG/1
75 TABLET ORAL DAILY
Status: DISCONTINUED | OUTPATIENT
Start: 2020-08-10 | End: 2020-08-12 | Stop reason: HOSPADM

## 2020-08-10 RX ORDER — IPRATROPIUM BROMIDE AND ALBUTEROL SULFATE 2.5; .5 MG/3ML; MG/3ML
3 SOLUTION RESPIRATORY (INHALATION) EVERY 4 HOURS PRN
Status: DISCONTINUED | OUTPATIENT
Start: 2020-08-10 | End: 2020-08-12 | Stop reason: HOSPADM

## 2020-08-10 RX ORDER — MORPHINE SULFATE 4 MG/ML
4 INJECTION, SOLUTION INTRAMUSCULAR; INTRAVENOUS
Status: COMPLETED | OUTPATIENT
Start: 2020-08-10 | End: 2020-08-10

## 2020-08-10 RX ORDER — ATORVASTATIN CALCIUM 40 MG/1
40 TABLET, FILM COATED ORAL NIGHTLY
Status: DISCONTINUED | OUTPATIENT
Start: 2020-08-10 | End: 2020-08-12 | Stop reason: HOSPADM

## 2020-08-10 RX ORDER — FERROUS SULFATE 325(65) MG
325 TABLET, DELAYED RELEASE (ENTERIC COATED) ORAL NIGHTLY
Status: DISCONTINUED | OUTPATIENT
Start: 2020-08-10 | End: 2020-08-12 | Stop reason: HOSPADM

## 2020-08-10 RX ORDER — IBUPROFEN 200 MG
16 TABLET ORAL
Status: DISCONTINUED | OUTPATIENT
Start: 2020-08-10 | End: 2020-08-12 | Stop reason: HOSPADM

## 2020-08-10 RX ORDER — NYSTATIN 100000 [USP'U]/G
POWDER TOPICAL 2 TIMES DAILY
Status: DISCONTINUED | OUTPATIENT
Start: 2020-08-10 | End: 2020-08-10

## 2020-08-10 RX ORDER — PANTOPRAZOLE SODIUM 40 MG/1
40 TABLET, DELAYED RELEASE ORAL
Status: DISCONTINUED | OUTPATIENT
Start: 2020-08-10 | End: 2020-08-12 | Stop reason: HOSPADM

## 2020-08-10 RX ORDER — IPRATROPIUM BROMIDE AND ALBUTEROL SULFATE 2.5; .5 MG/3ML; MG/3ML
3 SOLUTION RESPIRATORY (INHALATION) EVERY 6 HOURS
Status: DISCONTINUED | OUTPATIENT
Start: 2020-08-10 | End: 2020-08-11

## 2020-08-10 RX ORDER — MEMANTINE HYDROCHLORIDE 5 MG/1
10 TABLET ORAL 2 TIMES DAILY
Status: DISCONTINUED | OUTPATIENT
Start: 2020-08-10 | End: 2020-08-12 | Stop reason: HOSPADM

## 2020-08-10 RX ORDER — ACETAMINOPHEN 325 MG/1
650 TABLET ORAL EVERY 4 HOURS PRN
Status: DISCONTINUED | OUTPATIENT
Start: 2020-08-10 | End: 2020-08-12 | Stop reason: HOSPADM

## 2020-08-10 RX ORDER — PRAVASTATIN SODIUM 40 MG/1
40 TABLET ORAL NIGHTLY
Status: DISCONTINUED | OUTPATIENT
Start: 2020-08-10 | End: 2020-08-10

## 2020-08-10 RX ORDER — IBUPROFEN 200 MG
24 TABLET ORAL
Status: DISCONTINUED | OUTPATIENT
Start: 2020-08-10 | End: 2020-08-12 | Stop reason: HOSPADM

## 2020-08-10 RX ORDER — LISINOPRIL 20 MG/1
20 TABLET ORAL DAILY
Status: DISCONTINUED | OUTPATIENT
Start: 2020-08-10 | End: 2020-08-11

## 2020-08-10 RX ORDER — ENOXAPARIN SODIUM 150 MG/ML
1 INJECTION SUBCUTANEOUS
Status: DISCONTINUED | OUTPATIENT
Start: 2020-08-10 | End: 2020-08-11

## 2020-08-10 RX ORDER — SODIUM CHLORIDE 0.9 % (FLUSH) 0.9 %
10 SYRINGE (ML) INJECTION
Status: DISCONTINUED | OUTPATIENT
Start: 2020-08-10 | End: 2020-08-12 | Stop reason: HOSPADM

## 2020-08-10 RX ORDER — LEVOFLOXACIN 5 MG/ML
750 INJECTION, SOLUTION INTRAVENOUS
Status: DISCONTINUED | OUTPATIENT
Start: 2020-08-10 | End: 2020-08-12 | Stop reason: HOSPADM

## 2020-08-10 RX ORDER — DULOXETIN HYDROCHLORIDE 30 MG/1
60 CAPSULE, DELAYED RELEASE ORAL DAILY
Status: DISCONTINUED | OUTPATIENT
Start: 2020-08-10 | End: 2020-08-12 | Stop reason: HOSPADM

## 2020-08-10 RX ORDER — GLUCAGON 1 MG
1 KIT INJECTION
Status: DISCONTINUED | OUTPATIENT
Start: 2020-08-10 | End: 2020-08-12 | Stop reason: HOSPADM

## 2020-08-10 RX ORDER — CLOPIDOGREL BISULFATE 75 MG/1
300 TABLET ORAL
Status: COMPLETED | OUTPATIENT
Start: 2020-08-10 | End: 2020-08-10

## 2020-08-10 RX ORDER — FUROSEMIDE 40 MG/1
40 TABLET ORAL DAILY
Status: DISCONTINUED | OUTPATIENT
Start: 2020-08-10 | End: 2020-08-12 | Stop reason: HOSPADM

## 2020-08-10 RX ORDER — AMOXICILLIN 250 MG
1 CAPSULE ORAL 2 TIMES DAILY PRN
Status: DISCONTINUED | OUTPATIENT
Start: 2020-08-10 | End: 2020-08-12 | Stop reason: HOSPADM

## 2020-08-10 RX ORDER — FUROSEMIDE 10 MG/ML
20 INJECTION INTRAMUSCULAR; INTRAVENOUS DAILY
Status: DISCONTINUED | OUTPATIENT
Start: 2020-08-10 | End: 2020-08-10

## 2020-08-10 RX ORDER — METOPROLOL SUCCINATE 50 MG/1
50 TABLET, EXTENDED RELEASE ORAL DAILY
Status: DISCONTINUED | OUTPATIENT
Start: 2020-08-10 | End: 2020-08-12 | Stop reason: HOSPADM

## 2020-08-10 RX ORDER — TALC
6 POWDER (GRAM) TOPICAL NIGHTLY PRN
Status: DISCONTINUED | OUTPATIENT
Start: 2020-08-10 | End: 2020-08-12 | Stop reason: HOSPADM

## 2020-08-10 RX ORDER — ISOSORBIDE MONONITRATE 30 MG/1
60 TABLET, EXTENDED RELEASE ORAL DAILY
Status: DISCONTINUED | OUTPATIENT
Start: 2020-08-10 | End: 2020-08-12 | Stop reason: HOSPADM

## 2020-08-10 RX ORDER — LEVOFLOXACIN 5 MG/ML
500 INJECTION, SOLUTION INTRAVENOUS
Status: DISCONTINUED | OUTPATIENT
Start: 2020-08-10 | End: 2020-08-10

## 2020-08-10 RX ORDER — DONEPEZIL HYDROCHLORIDE 10 MG/1
10 TABLET, FILM COATED ORAL NIGHTLY
Status: DISCONTINUED | OUTPATIENT
Start: 2020-08-10 | End: 2020-08-12 | Stop reason: HOSPADM

## 2020-08-10 RX ORDER — HYDROCODONE BITARTRATE AND ACETAMINOPHEN 5; 325 MG/1; MG/1
1 TABLET ORAL EVERY 6 HOURS PRN
Status: DISCONTINUED | OUTPATIENT
Start: 2020-08-10 | End: 2020-08-12 | Stop reason: HOSPADM

## 2020-08-10 RX ORDER — NAPROXEN SODIUM 220 MG/1
81 TABLET, FILM COATED ORAL DAILY
Status: DISCONTINUED | OUTPATIENT
Start: 2020-08-10 | End: 2020-08-12 | Stop reason: HOSPADM

## 2020-08-10 RX ORDER — ONDANSETRON 2 MG/ML
4 INJECTION INTRAMUSCULAR; INTRAVENOUS
Status: COMPLETED | OUTPATIENT
Start: 2020-08-10 | End: 2020-08-10

## 2020-08-10 RX ORDER — FUROSEMIDE 10 MG/ML
20 INJECTION INTRAMUSCULAR; INTRAVENOUS
Status: COMPLETED | OUTPATIENT
Start: 2020-08-10 | End: 2020-08-10

## 2020-08-10 RX ADMIN — NYSTATIN: 100000 POWDER TOPICAL at 11:08

## 2020-08-10 RX ADMIN — PANTOPRAZOLE SODIUM 40 MG: 40 TABLET, DELAYED RELEASE ORAL at 05:08

## 2020-08-10 RX ADMIN — LEVOFLOXACIN 750 MG: 750 INJECTION, SOLUTION INTRAVENOUS at 05:08

## 2020-08-10 RX ADMIN — ENOXAPARIN SODIUM 135 MG: 150 INJECTION SUBCUTANEOUS at 09:08

## 2020-08-10 RX ADMIN — DOCUSATE SODIUM 50 MG AND SENNOSIDES 8.6 MG 1 TABLET: 8.6; 5 TABLET, FILM COATED ORAL at 05:08

## 2020-08-10 RX ADMIN — LISINOPRIL 20 MG: 20 TABLET ORAL at 09:08

## 2020-08-10 RX ADMIN — ISOSORBIDE MONONITRATE 60 MG: 30 TABLET, EXTENDED RELEASE ORAL at 09:08

## 2020-08-10 RX ADMIN — HYDROCODONE BITARTRATE AND ACETAMINOPHEN 1 TABLET: 5; 325 TABLET ORAL at 11:08

## 2020-08-10 RX ADMIN — HYDROCODONE BITARTRATE AND ACETAMINOPHEN 1 TABLET: 5; 325 TABLET ORAL at 05:08

## 2020-08-10 RX ADMIN — MEMANTINE HYDROCHLORIDE 10 MG: 5 TABLET ORAL at 09:08

## 2020-08-10 RX ADMIN — ONDANSETRON 4 MG: 2 INJECTION INTRAMUSCULAR; INTRAVENOUS at 12:08

## 2020-08-10 RX ADMIN — IPRATROPIUM BROMIDE AND ALBUTEROL SULFATE 3 ML: .5; 3 SOLUTION RESPIRATORY (INHALATION) at 08:08

## 2020-08-10 RX ADMIN — FERROUS SULFATE TAB EC 325 MG (65 MG FE EQUIVALENT) 325 MG: 325 (65 FE) TABLET DELAYED RESPONSE at 09:08

## 2020-08-10 RX ADMIN — IPRATROPIUM BROMIDE AND ALBUTEROL SULFATE 3 ML: .5; 3 SOLUTION RESPIRATORY (INHALATION) at 02:08

## 2020-08-10 RX ADMIN — MORPHINE SULFATE 4 MG: 4 INJECTION, SOLUTION INTRAMUSCULAR; INTRAVENOUS at 12:08

## 2020-08-10 RX ADMIN — FUROSEMIDE 20 MG: 10 INJECTION, SOLUTION INTRAVENOUS at 12:08

## 2020-08-10 RX ADMIN — Medication 10 ML: at 05:08

## 2020-08-10 RX ADMIN — DONEPEZIL HYDROCHLORIDE 10 MG: 10 TABLET, FILM COATED ORAL at 09:08

## 2020-08-10 RX ADMIN — CLOPIDOGREL 300 MG: 75 TABLET, FILM COATED ORAL at 12:08

## 2020-08-10 RX ADMIN — HYDROCODONE BITARTRATE AND ACETAMINOPHEN 1 TABLET: 5; 325 TABLET ORAL at 06:08

## 2020-08-10 RX ADMIN — MICONAZOLE NITRATE: 20 OINTMENT TOPICAL at 09:08

## 2020-08-10 RX ADMIN — ATORVASTATIN CALCIUM 40 MG: 40 TABLET, FILM COATED ORAL at 10:08

## 2020-08-10 RX ADMIN — FUROSEMIDE 40 MG: 40 TABLET ORAL at 10:08

## 2020-08-10 RX ADMIN — DULOXETINE HYDROCHLORIDE 60 MG: 30 CAPSULE, DELAYED RELEASE ORAL at 09:08

## 2020-08-10 RX ADMIN — CLOPIDOGREL 75 MG: 75 TABLET, FILM COATED ORAL at 09:08

## 2020-08-10 RX ADMIN — ASPIRIN 81 MG 81 MG: 81 TABLET ORAL at 09:08

## 2020-08-10 NOTE — CONSULTS
Please note that Dr. Barnett is already seeing this patient.     [facility] will sign [facility] now    Please re-consult if needed

## 2020-08-10 NOTE — PT/OT/SLP EVAL
"Occupational Therapy   Evaluation/Treatment    Name: Martina Betancourt  MRN: [id number]  Admitting Diagnosis:  Acute respiratory failure with hypoxia and hypercarbia      Recommendations:     Discharge Recommendations: nursing facility, skilled  Discharge Equipment Recommendations:  (TBD at next level of care)  Barriers to discharge:  (returnt to SNF)    Assessment:     Martina Betancourt is a 69 y.o. female with a medical diagnosis of Acute respiratory failure with hypoxia and hypercarbia.   Performance deficits affecting function: weakness, impaired endurance, impaired self care skills, impaired functional mobilty, impaired balance, decreased upper extremity function, decreased lower extremity function, pain, edema, impaired cardiopulmonary response to activity, orthopedic precautions.    The patient is limited by c/o chronic back pain and NWB? to the RLE 2* ankle fracture ~one month ago. The patient will benefit from OT to address functional deficits.    Rehab Prognosis: Fair; patient would benefit from acute skilled OT services to address these deficits and reach maximum level of function.       Plan:     Patient to be seen 5 x/week to address the above listed problems via self-care/home management, therapeutic activities, therapeutic exercises  · Plan of Care Expires: 08/24/20  · Plan of Care Reviewed with: patient    Subjective     Chief Complaint: severe back gown 2* "sciatic nerve"  Patient/Family Comments/goals: wants to be able to transfer to her power chair    Occupational Profile:  Living Environment: Prior to ankle fx, the patient lived alone   Previous level of function: Mod I with transfer to power chair. The patient was mod I with self care.  Roles and Routines: The patient was admitted from SNF.  Equipment Used at Home:  power chair(uses a Hoverroound)  Assistance upon Discharge: daughter    Pain/Comfort:  · Pain Rating 1: 8/10  · Location 1: back  · Pain Addressed 1: Nurse notified, Cessation of " Activity, Reposition(pain meds requested)  · Location - Side 2: Right  · Location 2: knee  · Pain Addressed 2: Reposition    Patients cultural, spiritual, Mandaeism conflicts given the current situation:      Objective:     Communicated with: nurseSharon prior to session.  Patient found HOB elevated with telemetry, oxygen, peripheral IV, bed alarm(5L NC) upon OT entry to room.    General Precautions: Standard, fall   Orthopedic Precautions:(ortho consult requested for right ankle fx)   Braces: (right ankle in splint)     Occupational Performance:    Bed Mobility:    · Patient completed Rolling/Turning to Left with  moderate assistance and with side rail  · Patient completed Rolling/Turning to Right with minimum assistance and with side rail  · Patient completed Scooting/Bridging with total assistance and 2 persons for draw sheet lift to the top of the bed in [facility]  · Patient completed Supine to Sit with maximal assistance and 2 persons  · Patient completed Sit to Supine with maximal assistance and 2 persons    Functional Mobility/Transfers:  · Functional Mobility: The patient tolerated sitting on the EOB ~5-1o min. With SBA.  The patient reports no right ankle pain but back pain was 8/10    Activities of Daily Living:  · Grooming: set up assist to wipe hands and face on the EOB    · Upper Body Dressing: maximal assistance    · Lower Body Dressing: dependence  to don left sock    Cognitive/Visual Perceptual:  Cognitive/Psychosocial Skills:     -       Oriented to: Person, Place, Situation and month and year   -       Follows Commands/attention:Follows two-step commands  -       Communication: clear/fluent  -       Memory: No Deficits noted  -       Safety awareness/insight to disability: intact   -       Mood/Affect/Coping skills/emotional control: Appropriate to situation    Physical Exam:  Balance: -       fair+ sitting  Postural examination/scapula alignment:    -       Rounded shoulders  -        Forward head  Skin integrity: Visible skin intact  Edema:  BLE  Sensation:    -       Intact  Upper Extremity Range of Motion:     -       Right Upper Extremity: WFL  -       Left Upper Extremity: WFL  Upper Extremity Strength:    -       Right Upper Extremity: WFL  -       Left Upper Extremity: WFL    AMPAC 6 Click ADL:  AMPAC Total Score: 16    Treatment & Education:  The patient participated in OT eval and rolling left to right to adjust sheets.  · Pt educated on OT role/POC.   · Importance of proper positioning in bed for LE edema management  · White board updated   · Multiple self-care tasks/functional mobility completed- assistance level noted above     Patient left HOB elevated with all lines intact, call button in reach, bed alarm on and nurse notified    GOALS:   Multidisciplinary Problems     Occupational Therapy Goals        Problem: Occupational Therapy Goal    Goal Priority Disciplines Outcome Interventions   Occupational Therapy Goal     OT, PT/OT Ongoing, Progressing    Description: Goals to be met by: 8/24/20    Patient will increase functional independence with ADLs by performing:    UE Dressing with Modified [place] and Supervision.  Grooming while seated with Modified [place].  Sitting at edge of bed x20 minutes with Modified [place].  Rolling to Bilateral with Modified [place].   Supine to sit with Contact Guard Assistance.  Squat pivot transfers: TBA as able  Upper extremity exercise program x15 reps per handout, with independence.                     History:     Past Medical History:   Diagnosis Date    Alzheimer disease     Alzheimer disease     Coronary artery disease     Hyperlipidemia     Hypertension     Obesity     Sleep apnea        Past Surgical History:   Procedure Laterality Date    APPENDECTOMY      APPENDECTOMY      CHOLECYSTECTOMY      CORONARY STENT PLACEMENT      HYSTERECTOMY      TONSILLECTOMY         Time Tracking:     OT Date of Treatment:  08/10/20  OT Start Time: 1111  OT Stop Time: 1140  OT Total Time (min): 29 min    Billable Minutes:Evaluation 15 (with PT)  Self Care/Home Management 14  Total Time 29    Chanelle Diaz OT  8/10/2020

## 2020-08-10 NOTE — PT/OT/SLP EVAL
"Physical Therapy Evaluation    Patient Name:  Martina Betancourt   MRN:  [id number]    Recommendations:     Discharge Recommendations:  nursing facility, skilled   Discharge Equipment Recommendations: hospital bed, lift device, wheelchair  And HHPT if DC'd to home with dtr. Stretcher for transport home (pt unable to stand to get into WC and unable to tolerate sitting > 10 minutes due to severe back pain)  Barriers to discharge: Decreased caregiver support    Assessment:     Martina Betancourt is a 69 y.o. female admitted with a medical diagnosis of Acute respiratory failure with hypoxia and hypercarbia.  She presents with the following impairments/functional limitations:  weakness, impaired self care skills, impaired balance, decreased ROM, impaired skin, impaired joint extensibility, impaired endurance, impaired functional mobilty, decreased upper extremity function, pain, impaired muscle length, gait instability, decreased lower extremity function, impaired cardiopulmonary response to activity, orthopedic precautions .    Pt is a good historian. She was living alone, mostly non amb but ind with t/f to her power scooter until she broke her R ankle. She stated she spent x 1 month in hospital until she was DC'd to SNF ~ 2 weeks ago. She describes that since she broke her R ankle she has not been OOB to a WC due to SNF not having a wide enough WC for her, so therapy mostly included sitting at EOB and performing therex. Today she was limited by severe "sciatic" back pain which radiates to her legs, worse with upright sitting at EOB. Pending Ortho consult for RLE WB status. Progressive Mobility Level 1: In-bed activity. Positioning, ROM, and turning as tolerated.       Rehab Prognosis: Good; patient would benefit from acute skilled PT services to address these deficits and reach maximum level of function.    Recent Surgery: * No surgery found *      Plan:     During this hospitalization, patient to be seen 6 x/week to address " the identified rehab impairments via therapeutic activities, therapeutic exercises, neuromuscular re-education and progress toward the following goals:    · Plan of Care Expires:  08/24/20    Subjective     Chief Complaint: sciatic pain once sitting EOB  Patient/Family Comments/goals: be able to stand and pivot into her hoverround and live with her dtr  Pain/Comfort:  · Pain Rating 1: 8/10  · Location 1: back  · Pain Addressed 1: Nurse notified, Reposition, Distraction, Cessation of Activity  · Pain Rating 2: (margot baker 10, with R leg lift via OT in bed)  · Location - Side 2: Right  · Location 2: knee  · Pain Addressed 2: Reposition    Patients cultural, spiritual, Congregational conflicts given the current situation: no    Living Environment:  From SNF (was there x 2 weeks). Has a dtr. Was living alone until R ankle fx ~ 6 weeks ago (per pt report)  Prior to admission, patients level of function was assist for bed mobiltiy at SNF. Non ambulatory; unable to get OOB with yesenia lift at SNF due to lack of accomodating WC (pt weighs > 300 #).  Equipment used at home: (has a Hoverround at home).  DME owned (not currently used): n/a.  Upon discharge, patient will have assistance from n/a.    Objective:     Communicated with LORAINE Herrera prior to session.  Patient found HOB elevated with bed alarm, peripheral IV, telemetry, oxygen(5L O2 via NC, diaper)  upon PT entry to room.    General Precautions: Standard, fall   Orthopedic Precautions:(assume NWB RLE until cleared by Ortho)   Braces: (R ankle in splint)     Exams:  · on 5LO2 throughout.   · Cognitive Exam:  Patient is oriented to Person, Place, Time and Situation  · Gross Motor Coordination:  WFL  · Postural Exam:  Patient presented with the following abnormalities:    · -       large body habitus  · Sensation:    · -       Intact  · Skin Integrity/Edema:      · -       Skin integrity: ecchymosis on lower abd  · RLE ROM: Deficits: ankle fixed in splint to neutral; hip and  knee WFL  · RLE Strength: Deficits: hip flex <3/5; knee flex/ext >3/5  · LLE ROM: WFL  · LLE Strength: Deficits: as RLE    Functional Mobility:  · Bed Mobility:     · Rolling Left:  moderate assistance  · Rolling Right: minimum assistance  · Scooting: stand by assistance and < 3 inches via BUEs and LLE pushoff, NWB RLE along EOB  · Supine to Sit: maximal assistance and of 2 persons  · Sit to Supine: maximal assistance and of 2 persons  · Balance: good seated   · Sat EOB ~ 5 min; c/o increased back pain. Notified RN  · Pt able to lean trunk forward moderately but limited by increased back pain with forward flexion    Therapeutic Activities and Exercises:   n/a    AM-PAC 6 CLICK MOBILITY  Total Score:9     Patient left HOB elevated with call button in reach, bed alarm on and RN present.    GOALS:   Multidisciplinary Problems     Physical Therapy Goals        Problem: Physical Therapy Goal    Goal Priority Disciplines Outcome Goal Variances Interventions   Physical Therapy Goal     PT, PT/OT Ongoing, Progressing     Description: Goals to be met by:      Patient will increase functional independence with mobility by performin. Supine to sit with Stand-by Assistance  2. Sit to supine with Stand-by Assistance  3. Rolling to Left and Right with Stand-by Assistance.  4. Sitting at edge of bed x20 minutes with Modified [place]  5. Lower extremity exercise program x20 reps per handout, with supervision    Standing TBA once WB status clarified after Ortho consult                     History:     Past Medical History:   Diagnosis Date    Alzheimer disease     Alzheimer disease     Coronary artery disease     Hyperlipidemia     Hypertension     Obesity     Sleep apnea        Past Surgical History:   Procedure Laterality Date    APPENDECTOMY      APPENDECTOMY      CHOLECYSTECTOMY      CORONARY STENT PLACEMENT      HYSTERECTOMY      TONSILLECTOMY         Time Tracking:     PT Received On: 08/10/20  PT  Start Time: 1110     PT Stop Time: 1140  PT Total Time (min): 30 min     Billable Minutes: Evaluation 15   Coeval with OT      yKm Messer, PT  08/10/2020

## 2020-08-10 NOTE — PT/OT/SLP EVAL
Speech Language Pathology Evaluation  Bedside Swallow    Patient Name:  Martina Betancourt   MRN:  [id number]  Admitting Diagnosis: Acute respiratory failure with hypoxia and hypercarbia    Recommendations:                 General Recommendations:  Dysphagia therapy  Diet recommendations:  Mechanical soft, No Rice, No Bread, Thin   Aspiration Precautions: Alternating bites/sips, Feed only when awake/alert, HOB to 90 degrees, Meds whole 1 at a time and Monitor for s/s of aspiration   General Precautions: Standard, respiratory  Communication strategies:  none    History:     Past Medical History:   Diagnosis Date    Alzheimer disease     Alzheimer disease     Coronary artery disease     Hyperlipidemia     Hypertension     Obesity     Sleep apnea        Past Surgical History:   Procedure Laterality Date    APPENDECTOMY      APPENDECTOMY      CHOLECYSTECTOMY      CORONARY STENT PLACEMENT      HYSTERECTOMY      TONSILLECTOMY         Social History: Patient lives at home idependently.    Chest X-Rays:  Most recent on 8/10/20 compared to 7/28/20     Suggestion of diffuse superimposed interstitial infiltrate or edema and probable left basilar small effusion    Prior diet: Regular diet consistency     Subjective     Pt awake and alert with her daughter present at b/s. Per pt and her daughter, pt has no hx of difficulty with eating/drinking.  HOB adjusted for initiation of ST evaluation.     Objective:     Oral Musculature Evaluation  · Oral Musculature: WFL  · Dentition: edentulous  · Mucosal Quality: adequate  · Mandibular Strength and Mobility: WFL  · Oral Labial Strength and Mobility: WFL  · Lingual Strength and Mobility: WFL, functional strength  · Buccal Strength and Mobility: WFL, decreased tone  · Volitional Swallow: Delayed  · Voice Prior to PO Intake: Clear    Bedside Swallow Eval:   Consistencies Assessed:  · Thin liquids approx 4oz  · Puree x5tsp   · Soft solids x3     Oral Phase:   · Prolonged mastication  - across trials soft solid trials  · Slow oral transit time - across PO trials     Pharyngeal Phase:   · decreased hyolaryngeal excursion to palpation  · delayed swallow initation    Compensatory Strategies  · None    Treatment: Pt and daughter educated regarding overt s/s of aspiration. Silent aspiration cannot be r/o at b/s.     Assessment:     Martina Betancourt is a 69 y.o. female with a medical diagnosis of Acute respiratory failure with hypoxia and hypercarbia. She presents with dysphagia c/b increased mastication, delayed A-P transport and delayed swallow timing.     Goals:   Multidisciplinary Problems     SLP Goals        Problem: SLP Goal    Goal Priority Disciplines Outcome   SLP Goal     SLP Ongoing, Progressing   Description: 1. Pt will tolerate mech soft diet (NO Rice, NO Bread) and thin liquids without overt s/s of aspiration.                    Plan:     · Patient to be seen:  3 x/week   · Plan of Care expires:  08/21/20  · Plan of Care reviewed with:  patient, daughter   · SLP Follow-Up:  Yes       Discharge recommendations:  (TBD)   Barriers to Discharge:  None    Time Tracking:     SLP Treatment Date:   08/10/20  Speech Start Time:  1030  Speech Stop Time:  1045     Speech Total Time (min):  15 min    Billable Minutes: Eval Swallow and Oral Function 8min and Seld Care/Home Management Training 7min    Fany Reis CCC-SLP  08/10/2020

## 2020-08-10 NOTE — ASSESSMENT & PLAN NOTE
Peak troponin 1.6. No acute EKG changes or CP. DNR. Unclear if this was demand ischemia - will treat as small NSTEMI. Poor cath lab candidate - dementia/DNR. Check echo. Medical Rx for CAD

## 2020-08-10 NOTE — PLAN OF CARE
Problem: Physical Therapy Goal  Goal: Physical Therapy Goal  Description: Goals to be met by:      Patient will increase functional independence with mobility by performin. Supine to sit with Stand-by Assistance  2. Sit to supine with Stand-by Assistance  3. Rolling to Left and Right with Stand-by Assistance.  4. Sitting at edge of bed x20 minutes with Modified [place]  5. Lower extremity exercise program x20 reps per handout, with supervision    Standing TBA once WB status clarified after Ortho consult    Outcome: Ongoing, Progressing    6x. Return to SNF

## 2020-08-10 NOTE — ED NOTES
[facility] states patient accepted to [facility], Rm 322.  Call report to [phone]

## 2020-08-10 NOTE — NURSING
Report received from LORAINE Greene. Visualized patient and assessed patient's overall condition and appearance. No acute distress noted. Will continue to monitor

## 2020-08-10 NOTE — PLAN OF CARE
B/s swallow evaluation completed. ST will follow.     Problem: SLP Goal  Goal: SLP Goal  Description: 1. Pt will tolerate mech soft diet (NO Rice, NO Bread) and thin liquids without overt s/s of aspiration.   Outcome: Ongoing, Progressing

## 2020-08-10 NOTE — PLAN OF CARE
Problem: Infection (Pneumonia)  Goal: Resolution of Infection Signs/Symptoms  Outcome: Ongoing, Progressing     Problem: Adult Inpatient Plan of Care  Goal: Plan of Care Review  Outcome: Ongoing, Progressing     Problem: Wound  Goal: Optimal Wound Healing  Outcome: Ongoing, Progressing     Problem: Infection  Goal: Infection Symptom Resolution  Outcome: Ongoing, Progressing     Problem: Fall Injury Risk  Goal: Absence of Fall and Fall-Related Injury  Outcome: Ongoing, Progressing

## 2020-08-10 NOTE — CONSULTS
"Orthopedics Consult Note     CC: right ankle fracture     HPI: 69 female recently hospitalized at [facility] - sustained a R trimal fx about 1 month ago - does not know how it happened because she states she was "out of it"   Readmitted for concern for NSTEMI and transferred here   Non ambulatory prior to injury of ankle  Was supposed to follow-up with an orthopedic surgeon in [place] but she states he would not see her because she was in a nursing home.  She states that she plans on going home from here not returning to the nursing home.  Minimal pain of the ankle  Has been in the splint since her last hospitalization  Has not have the splint taken down since its placement    Past Medical History:   Diagnosis Date    Alzheimer disease     Alzheimer disease     Coronary artery disease     Hyperlipidemia     Hypertension     Obesity     Sleep apnea      Past Surgical History:   Procedure Laterality Date    APPENDECTOMY      APPENDECTOMY      CHOLECYSTECTOMY      CORONARY STENT PLACEMENT      HYSTERECTOMY      TONSILLECTOMY       Social History     Tobacco Use    Smoking status: Unknown If Ever Smoked   Substance Use Topics    Alcohol use: Not Currently    Drug use: Never     History reviewed. No pertinent family history.      Current Facility-Administered Medications:     acetaminophen tablet 650 mg, 650 mg, Oral, Q4H PRN, LUIS Avery MD    albuterol-ipratropium 2.5 mg-0.5 mg/3 mL nebulizer solution 3 mL, 3 mL, Nebulization, Q4H PRN, LUIS Avery MD    albuterol-ipratropium 2.5 mg-0.5 mg/3 mL nebulizer solution 3 mL, 3 mL, Nebulization, Q6H, LUIS Avery MD, 3 mL at 08/10/20 1400    aspirin chewable tablet 81 mg, 81 mg, Oral, Daily, LUIS Avery MD, 81 mg at 08/10/20 0912    atorvastatin tablet 40 mg, 40 mg, Oral, QHS, Trevon Nolen MD, 40 mg at 08/10/20 1045    clopidogreL tablet 75 mg, 75 mg, Oral, Daily, LUIS Avery MD, 75 mg at 08/10/20 0912    dextrose 50% " injection 12.5 g, 12.5 g, Intravenous, PRN, LUIS Avery MD    dextrose 50% injection 25 g, 25 g, Intravenous, PRN, LUIS Avery MD    donepeziL tablet 10 mg, 10 mg, Oral, QHS, LUIS Avery MD    DULoxetine DR capsule 60 mg, 60 mg, Oral, Daily, LUIS Avery MD, 60 mg at 08/10/20 0913    enoxaparin injection 135 mg, 1 mg/kg, Subcutaneous, Q12H, LUIS Avery MD, 135 mg at 08/10/20 0914    ferrous sulfate EC tablet 325 mg, 325 mg, Oral, QHS, LUIS Avery MD    furosemide tablet 40 mg, 40 mg, Oral, Daily, Trevon Nolen MD, 40 mg at 08/10/20 1045    glucagon (human recombinant) injection 1 mg, 1 mg, Intramuscular, PRN, LUIS Avery MD    glucose chewable tablet 16 g, 16 g, Oral, PRN, LUIS Avery MD    glucose chewable tablet 24 g, 24 g, Oral, PRN, LUIS Avery MD    HYDROcodone-acetaminophen 5-325 mg per tablet 1 tablet, 1 tablet, Oral, Q6H PRN, LUIS Avery MD, 1 tablet at 08/10/20 1137    isosorbide mononitrate 24 hr tablet 60 mg, 60 mg, Oral, Daily, LUIS Avery MD, 60 mg at 08/10/20 0912    levoFLOXacin 750 mg/150 mL IVPB 750 mg, 750 mg, Intravenous, Q24H, LUIS Avery MD, Last Rate: 100 mL/hr at 08/10/20 0537, 750 mg at 08/10/20 0537    lisinopriL tablet 20 mg, 20 mg, Oral, Daily, LUIS Avery MD, 20 mg at 08/10/20 0913    melatonin tablet 6 mg, 6 mg, Oral, Nightly PRN, LUIS Avery MD    memantine tablet 10 mg, 10 mg, Oral, BID, LUIS Avery MD, 10 mg at 08/10/20 0912    metoprolol succinate (TOPROL-XL) 24 hr tablet 50 mg, 50 mg, Oral, Daily, LUIS Avery MD    miconazole nitrate 2% ointment, , Topical (Top), BID, Trevon Nolen MD    pantoprazole EC tablet 40 mg, 40 mg, Oral, Before breakfast, LUIS Avery MD, 40 mg at 08/10/20 0534    promethazine (PHENERGAN) 6.25 mg in dextrose 5 % 50 mL IVPB, 6.25 mg, Intravenous, Q6H PRN, LUIS Avery MD    senna-docusate 8.6-50 mg per tablet 1 tablet, 1 tablet, Oral,  BID LUIS HICKEY MD, 1 tablet at 08/10/20 0534    sodium chloride 0.9% flush 10 mL, 10 mL, Intravenous, PRN, LUIS Avery MD, 10 mL at 08/10/20 0539    Physical Exam:    Temp:  [97.7 °F (36.5 °C)-98.5 °F (36.9 °C)] 98.4 °F (36.9 °C)  Pulse:  [] 70  Resp:  [16-42] 18  SpO2:  [90 %-99 %] 92 %  BP: (102-131)/(50-66) 102/50    General: Patient is   alert, awake and oriented to time, place and person. Mood and affect are appropriate.  Patient does not appear to be in any distress, denies any constitutional symptoms and appears stated age.   HEENT: Pupils are equal and round, sclera are not injected. External examination of ears and nose reveals no abnormalities. Cranial nerves II-X are grossly intact  Skin:  No rashes or abrasions.  Pressure wound over heel as described below  Resp: No respiratory distress or audible wheezing   CV: 2+  pulses, all extremities warm and well perfused   Right Ankle    Posterior slab splint applied to the ankle.  She was splinted in about 20° of plantar flexion  Diffuse ecchymosis over the medial lateral aspects of the ankle  No abrasions, lacerations or pressure injury over the medial or lateral ankle.  Minimally tender palpation over medial and lateral malleoli  Posterior heel demonstrates area of pressure necrosis with non blanchable skin surrounding.  No exposed bone or subcutaneous tissue.  It is about 2 and 0.5 cm in diameter  Ltsi s/s/sp/dp/t  + ehl/fhl/ta/gs  2+ DP        Imaging:  Three views of the right ankle show a nondisplaced fractures of the medial, lateral and posterior malleoli.  There is some early callus formation.      A/P: 69 y.o. female with right trimalleolar fracture  - this fracture can be treated as an outpatient.  She may benefit from non operative treatment given her nonambulatory status and multiple medical comorbidities with increased cardiac and medical perioperative risk.  - Mepilex to heel   - wound care consult for pressure wound over the  heel  - discontinue splint, place CAM boot  - nonweightbearing right lower extremity  - if she does go home she will need to be set up with home health PT/OT, wound care, DME in outpatient orthopedic follow-up.  She does prefer to follow up with someone closer to her home, possibly in [place].  She did have an orthopedic surgeon that she was established with but he recently moved to [place] so she does not have anyone to see.  She will need to be set up with this definitively prior to discharge given lack of follow-up after last admission.  If she has difficulty establishing care with an orthopedic surgeon closer to home I will be happy to see her in my clinic here.

## 2020-08-10 NOTE — SUBJECTIVE & OBJECTIVE
Past Medical History:   Diagnosis Date    Alzheimer disease     Alzheimer disease     Coronary artery disease     Hyperlipidemia     Hypertension     Obesity     Sleep apnea        Past Surgical History:   Procedure Laterality Date    APPENDECTOMY      APPENDECTOMY      CHOLECYSTECTOMY      CORONARY STENT PLACEMENT      HYSTERECTOMY      TONSILLECTOMY         Review of patient's allergies indicates:   Allergen Reactions    Hydroxyzine     Tizanidine        Current Facility-Administered Medications on File Prior to Encounter   Medication    [COMPLETED] albuterol-ipratropium 2.5 mg-0.5 mg/3 mL nebulizer solution 3 mL    [COMPLETED] aspirin tablet 325 mg    [COMPLETED] clopidogreL tablet 300 mg    [COMPLETED] enoxaparin injection 100 mg    [COMPLETED] furosemide injection 20 mg    [COMPLETED] iohexoL (OMNIPAQUE 350) injection 100 mL    [COMPLETED] metoprolol injection 2.5 mg    [COMPLETED] morphine injection 4 mg    [COMPLETED] ondansetron injection 4 mg     Current Outpatient Medications on File Prior to Encounter   Medication Sig    acetaminophen (TYLENOL) 325 MG tablet Take 2 tablets (650 mg total) by mouth every 6 (six) hours as needed (HEADACHE, TEMPERATURE - FEVER > 100.4).    albuterol-ipratropium (DUO-NEB) 2.5 mg-0.5 mg/3 mL nebulizer solution Take 3 mLs by nebulization 3 (three) times daily. Rescue    albuterol-ipratropium (DUO-NEB) 2.5 mg-0.5 mg/3 mL nebulizer solution Take 3 mLs by nebulization every 2 (two) hours as needed for Wheezing or Shortness of Breath. Rescue    aspirin (ECOTRIN) 81 MG EC tablet Take 81 mg by mouth once daily.    donepeziL (ARICEPT) 10 MG tablet Take 10 mg by mouth every evening.    DULoxetine (CYMBALTA) 60 MG capsule Take 60 mg by mouth once daily.    ferrous sulfate 324 mg (65 mg iron) TbEC Take 325 mg by mouth every evening.    HYDROcodone-acetaminophen (NORCO)  mg per tablet Take 1 tablet by mouth every 4 (four) hours as needed.     isosorbide mononitrate (IMDUR) 60 MG 24 hr tablet Take 60 mg by mouth once daily.    lisinopriL (PRINIVIL,ZESTRIL) 20 MG tablet Take 1 tablet (20 mg total) by mouth once daily.    memantine (NAMENDA XR) 28 mg CSpX Take 28 mg by mouth once daily.    metoprolol succinate (TOPROL-XL) 50 MG 24 hr tablet Take 50 mg by mouth once daily.    pantoprazole (PROTONIX) 40 MG tablet Take 1 tablet (40 mg total) by mouth before breakfast.    pravastatin (PRAVACHOL) 40 MG tablet Take 40 mg by mouth every evening.     Family History     None        Tobacco Use    Smoking status: Unknown If Ever Smoked   Substance and Sexual Activity    Alcohol use: Not Currently    Drug use: Never    Sexual activity: Not Currently     Review of Systems   Unable to perform ROS: acuity of condition     Objective:     Vital Signs (Most Recent):  Temp: 97.7 °F (36.5 °C) (08/10/20 0743)  Pulse: (!) 57 (08/10/20 0826)  Resp: 20 (08/10/20 0826)  BP: (!) 109/56 (08/10/20 0743)  SpO2: 96 % (08/10/20 0826) Vital Signs (24h Range):  Temp:  [97.7 °F (36.5 °C)-98.5 °F (36.9 °C)] 97.7 °F (36.5 °C)  Pulse:  [] 57  Resp:  [18-42] 20  SpO2:  [90 %-99 %] 96 %  BP: (105-131)/(52-66) 109/56     Weight: (!) 139 kg (306 lb 7 oz)  Body mass index is 43.97 kg/m².    SpO2: 96 %  O2 Device (Oxygen Therapy): nasal cannula      Intake/Output Summary (Last 24 hours) at 8/10/2020 0927  Last data filed at 8/10/2020 0730  Gross per 24 hour   Intake 270 ml   Output --   Net 270 ml       Lines/Drains/Airways     Peripheral Intravenous Line                 Peripheral IV - Single Lumen 08/09/20 1752 22 G Left Wrist less than 1 day         Peripheral IV - Single Lumen 08/09/20 2100 18 G Left Antecubital less than 1 day                Physical Exam   Constitutional: She is oriented to person, place, and time. She appears well-developed and well-nourished.   HENT:   Head: Normocephalic and atraumatic.   Eyes: Pupils are equal, round, and reactive to light. Conjunctivae are  normal.   Neck: Normal range of motion. Neck supple.   Cardiovascular: Normal rate, normal heart sounds and intact distal pulses.   Pulmonary/Chest: Effort normal. She has decreased breath sounds.   Abdominal: Soft. Bowel sounds are normal.   Musculoskeletal: Normal range of motion.   Neurological: She is alert and oriented to person, place, and time.   Skin: Skin is warm and dry.       Significant Labs: All pertinent lab results from the last 24 hours have been reviewed.    Significant Imaging: Echocardiogram: 2D echo with color flow doppler: No results found for this or any previous visit.

## 2020-08-10 NOTE — PLAN OF CARE
(Physician in Lead of Transfers)   Outside Transfer Acceptance Note / [facility]erral [facility]      Transferring Physician: Dr. Oates    Accepting Physician: Kailash Silvestre MD    Date of Acceptance: 08/10/2020    Transferring Facility: [facility]    Reason for Transfer: NSTEMI    Report from Transferring Physician/Hospital course: The patient is a 68 y/o female NH resident with PMH of Alzheimer's, CAD, HTN, HLP, and sleep apnea who presented with SOB. Covid negative. Patient is a DNR. Placed on BIPAP and is requiring continuous BIPAP. Initial troponin of 0.1 then trended up to 1.6. CTA negative for PE. Given ASA and lovenox. Possible NSTEMI. Transferring due to ICU bed requirement.       Labs & Radiographs: see EPIC      To Do List:   1) Telemetry  2) Wean BIPAP as tolerated  3) ACS work up       Kailash Silvestre MD  Hospital Medicine Staff

## 2020-08-10 NOTE — PLAN OF CARE
Problem: Occupational Therapy Goal  Goal: Occupational Therapy Goal  Description: Goals to be met by: 8/24/20    Patient will increase functional independence with ADLs by performing:    UE Dressing with Modified [place] and Supervision.  Grooming while seated with Modified [place].  Sitting at edge of bed x20 minutes with Modified [place].  Rolling to Bilateral with Modified [place].   Supine to sit with Contact Guard Assistance.  Squat pivot transfers: TBA as able  Upper extremity exercise program x15 reps per handout, with independence.    Outcome: Ongoing, Progressing   The patient participated in OT eval. The patient will benefit from OT to address functional deficits.    The patient will benefit from continued Rehab via SNF

## 2020-08-10 NOTE — NURSING TRANSFER
Nursing Transfer Note      8/10/2020     Transfer from [facility] to [facility]     Transfer via stretcher     Transfer with 4 person assist and transfer board    Transported by [facility]    Medicines sent: none    Chart send with patient: YES     Notified: Dr. Avery    Patient reassessed at:     Upon arrival to floor: Telemetry

## 2020-08-10 NOTE — HPI
"HPI: Ms. Betancourt is a 70yo lady with morbid obesity, YOVANI, alzheimers and HTN who is residing in a nursing home currently due to a fractured right ankle.  She cannot remember the day's events, but per EMS reports, "sats were in the 50s when they got the nursing home.  A breathing treatment was given the nursing home and placed on 100% non-rebreather by EMS.  Patient is a DNR and is a patient of Dr. Fuller."     She was taken to [facility] and underwent work up with CTA, which was negative for PTE.  She was found to have an elevated trop and BNP though.  She was begun on NSTEMI therapy and placed on BiPAP.  She was transferred here for further management.  The patient denies any CP to me.  She states she does feel SOB.    On BiPAP - denies CP. Less SOB  EKG NSR - no acute STT changes  Troponin peak 1.6 now trending down    Prior cardiac Hx unknown  "

## 2020-08-10 NOTE — ASSESSMENT & PLAN NOTE
Her troponin is rapidly rising:   -0.142 -> 0.877 ->1.66  Loaded with Plavix at outside ED   -Continue plavix 75mg po qday  Given ASA 325mg x 1 at outside ED   -Continue ASA 81mg po qday  Lovenox 1mg/Kg given at outside ED at 8:30pm 8/9/20   -Continue with Lovenox 135mg SQ q12 hours at 9am  Telemetry, Cardiology consult  Given age, degree of illness and DNR, recommend against LH   -Likely better for medical management  Check Echo

## 2020-08-10 NOTE — CONSULTS
"[facility]  Cardiology  Consult Note    Patient Name: Martina Betancourt  MRN: [id number]  Admission Date: 8/10/2020  Hospital Length of Stay: 0 days  Code Status: DNR   Attending Provider: Trevon Nolen MD   Consulting Provider: Nish Felix MD  Primary Care Physician: Joe Fuller Iii, MD  Principal Problem:Acute respiratory failure with hypoxia and hypercarbia    Patient information was obtained from patient and ER records.     Consults  Subjective:     Chief Complaint:  NSTEMI     HPI: Ms. Betancourt is a 68yo lady with morbid obesity, YOVANI, alzheimers and HTN who is residing in a nursing home currently due to a fractured right ankle.  She cannot remember the day's events, but per EMS reports, "sats were in the 50s when they got the nursing home.  A breathing treatment was given the nursing home and placed on 100% non-rebreather by EMS.  Patient is a DNR and is a patient of Dr. Fuller."     She was taken to [facility] and underwent work up with CTA, which was negative for PTE.  She was found to have an elevated trop and BNP though.  She was begun on NSTEMI therapy and placed on BiPAP.  She was transferred here for further management.  The patient denies any CP to me.  She states she does feel SOB.    On BiPAP - denies CP. Less SOB  EKG NSR - no acute STT changes  Troponin peak 1.6 now trending down    Prior cardiac Hx unknown    Past Medical History:   Diagnosis Date    Alzheimer disease     Alzheimer disease     Coronary artery disease     Hyperlipidemia     Hypertension     Obesity     Sleep apnea        Past Surgical History:   Procedure Laterality Date    APPENDECTOMY      APPENDECTOMY      CHOLECYSTECTOMY      CORONARY STENT PLACEMENT      HYSTERECTOMY      TONSILLECTOMY         Review of patient's allergies indicates:   Allergen Reactions    Hydroxyzine     Tizanidine        Current Facility-Administered Medications on File Prior to Encounter   Medication    " [COMPLETED] albuterol-ipratropium 2.5 mg-0.5 mg/3 mL nebulizer solution 3 mL    [COMPLETED] aspirin tablet 325 mg    [COMPLETED] clopidogreL tablet 300 mg    [COMPLETED] enoxaparin injection 100 mg    [COMPLETED] furosemide injection 20 mg    [COMPLETED] iohexoL (OMNIPAQUE 350) injection 100 mL    [COMPLETED] metoprolol injection 2.5 mg    [COMPLETED] morphine injection 4 mg    [COMPLETED] ondansetron injection 4 mg     Current Outpatient Medications on File Prior to Encounter   Medication Sig    acetaminophen (TYLENOL) 325 MG tablet Take 2 tablets (650 mg total) by mouth every 6 (six) hours as needed (HEADACHE, TEMPERATURE - FEVER > 100.4).    albuterol-ipratropium (DUO-NEB) 2.5 mg-0.5 mg/3 mL nebulizer solution Take 3 mLs by nebulization 3 (three) times daily. Rescue    albuterol-ipratropium (DUO-NEB) 2.5 mg-0.5 mg/3 mL nebulizer solution Take 3 mLs by nebulization every 2 (two) hours as needed for Wheezing or Shortness of Breath. Rescue    aspirin (ECOTRIN) 81 MG EC tablet Take 81 mg by mouth once daily.    donepeziL (ARICEPT) 10 MG tablet Take 10 mg by mouth every evening.    DULoxetine (CYMBALTA) 60 MG capsule Take 60 mg by mouth once daily.    ferrous sulfate 324 mg (65 mg iron) TbEC Take 325 mg by mouth every evening.    HYDROcodone-acetaminophen (NORCO)  mg per tablet Take 1 tablet by mouth every 4 (four) hours as needed.    isosorbide mononitrate (IMDUR) 60 MG 24 hr tablet Take 60 mg by mouth once daily.    lisinopriL (PRINIVIL,ZESTRIL) 20 MG tablet Take 1 tablet (20 mg total) by mouth once daily.    memantine (NAMENDA XR) 28 mg CSpX Take 28 mg by mouth once daily.    metoprolol succinate (TOPROL-XL) 50 MG 24 hr tablet Take 50 mg by mouth once daily.    pantoprazole (PROTONIX) 40 MG tablet Take 1 tablet (40 mg total) by mouth before breakfast.    pravastatin (PRAVACHOL) 40 MG tablet Take 40 mg by mouth every evening.     Family History     None        Tobacco Use    Smoking  status: Unknown If Ever Smoked   Substance and Sexual Activity    Alcohol use: Not Currently    Drug use: Never    Sexual activity: Not Currently     Review of Systems   Unable to perform ROS: acuity of condition     Objective:     Vital Signs (Most Recent):  Temp: 97.7 °F (36.5 °C) (08/10/20 0743)  Pulse: (!) 57 (08/10/20 0826)  Resp: 20 (08/10/20 0826)  BP: (!) 109/56 (08/10/20 0743)  SpO2: 96 % (08/10/20 0826) Vital Signs (24h Range):  Temp:  [97.7 °F (36.5 °C)-98.5 °F (36.9 °C)] 97.7 °F (36.5 °C)  Pulse:  [] 57  Resp:  [18-42] 20  SpO2:  [90 %-99 %] 96 %  BP: (105-131)/(52-66) 109/56     Weight: (!) 139 kg (306 lb 7 oz)  Body mass index is 43.97 kg/m².    SpO2: 96 %  O2 Device (Oxygen Therapy): nasal cannula      Intake/Output Summary (Last 24 hours) at 8/10/2020 0927  Last data filed at 8/10/2020 0730  Gross per 24 hour   Intake 270 ml   Output --   Net 270 ml       Lines/Drains/Airways     Peripheral Intravenous Line                 Peripheral IV - Single Lumen 08/09/20 1752 22 G Left Wrist less than 1 day         Peripheral IV - Single Lumen 08/09/20 2100 18 G Left Antecubital less than 1 day                Physical Exam   Constitutional: She is oriented to person, place, and time. She appears well-developed and well-nourished.   HENT:   Head: Normocephalic and atraumatic.   Eyes: Pupils are equal, round, and reactive to light. Conjunctivae are normal.   Neck: Normal range of motion. Neck supple.   Cardiovascular: Normal rate, normal heart sounds and intact distal pulses.   Pulmonary/Chest: Effort normal. She has decreased breath sounds.   Abdominal: Soft. Bowel sounds are normal.   Musculoskeletal: Normal range of motion.   Neurological: She is alert and oriented to person, place, and time.   Skin: Skin is warm and dry.       Significant Labs: All pertinent lab results from the last 24 hours have been reviewed.    Significant Imaging: Echocardiogram: 2D echo with color flow doppler: No results found  for this or any previous visit.    Assessment and Plan:     * Acute respiratory failure with hypoxia and hypercarbia  Likely the trigger for elevated troponin - per primary    NSTEMI (non-ST elevated myocardial infarction)  Peak troponin 1.6. No acute EKG changes or CP. DNR. Unclear if this was demand ischemia - will treat as small NSTEMI. Poor cath lab candidate - dementia/DNR. Check echo. Medical Rx for CAD    Do not resuscitate  noted    Dementia without behavioral disturbance  Per primary        VTE Risk Mitigation (From admission, onward)         Ordered     enoxaparin injection 135 mg  Every 12 hours (non-standard times)      08/10/20 6428                Thank you for your consult. I will follow-up with patient. Please contact us if you have any additional questions.    Nish Felix MD  Cardiology   [facility]

## 2020-08-10 NOTE — ASSESSMENT & PLAN NOTE
"She is supposed to be using he CPAP, but states she, "hasn't used it in 3 years"  On BiPAP currently    "

## 2020-08-10 NOTE — HPI
"Ms. Betancourt is a 68yo lady with morbid obesity, YOVANI, alzheimers and HTN who is residing in a nursing home currently due to a fractured right ankle.  She cannot remember the day's events, but per EMS reports, "sats were in the 50s when they got the nursing home.  A breathing treatment was given the nursing home and placed on 100% non-rebreather by EMS.  Patient is a DNR and is a patient of Dr. Fuller."    She was taken to [facility] and underwent work up with CTA, which was negative for PTE.  She was found to have an elevated trop and BNP though.  She was begun on NSTEMI therapy and placed on BiPAP.  She was transferred here for further management.  The patient denies any CP to me.  She states she does feel SOB.    "

## 2020-08-10 NOTE — SUBJECTIVE & OBJECTIVE
Past Medical History:   Diagnosis Date    Alzheimer disease     Alzheimer disease     Coronary artery disease     Hyperlipidemia     Hypertension     Obesity     Sleep apnea        Past Surgical History:   Procedure Laterality Date    APPENDECTOMY      APPENDECTOMY      CHOLECYSTECTOMY      CORONARY STENT PLACEMENT      HYSTERECTOMY      TONSILLECTOMY         Review of patient's allergies indicates:   Allergen Reactions    Hydroxyzine     Tizanidine        Current Facility-Administered Medications on File Prior to Encounter   Medication    [COMPLETED] albuterol-ipratropium 2.5 mg-0.5 mg/3 mL nebulizer solution 3 mL    [COMPLETED] aspirin tablet 325 mg    [COMPLETED] clopidogreL tablet 300 mg    [COMPLETED] enoxaparin injection 100 mg    [COMPLETED] furosemide injection 20 mg    [COMPLETED] iohexoL (OMNIPAQUE 350) injection 100 mL    [COMPLETED] metoprolol injection 2.5 mg    [COMPLETED] morphine injection 4 mg    [COMPLETED] ondansetron injection 4 mg     Current Outpatient Medications on File Prior to Encounter   Medication Sig    acetaminophen (TYLENOL) 325 MG tablet Take 2 tablets (650 mg total) by mouth every 6 (six) hours as needed (HEADACHE, TEMPERATURE - FEVER > 100.4).    albuterol-ipratropium (DUO-NEB) 2.5 mg-0.5 mg/3 mL nebulizer solution Take 3 mLs by nebulization 3 (three) times daily. Rescue    albuterol-ipratropium (DUO-NEB) 2.5 mg-0.5 mg/3 mL nebulizer solution Take 3 mLs by nebulization every 2 (two) hours as needed for Wheezing or Shortness of Breath. Rescue    aspirin (ECOTRIN) 81 MG EC tablet Take 81 mg by mouth once daily.    donepeziL (ARICEPT) 10 MG tablet Take 10 mg by mouth every evening.    DULoxetine (CYMBALTA) 60 MG capsule Take 60 mg by mouth once daily.    ferrous sulfate 324 mg (65 mg iron) TbEC Take 325 mg by mouth every evening.    HYDROcodone-acetaminophen (NORCO)  mg per tablet Take 1 tablet by mouth every 4 (four) hours as needed.     isosorbide mononitrate (IMDUR) 60 MG 24 hr tablet Take 60 mg by mouth once daily.    lisinopriL (PRINIVIL,ZESTRIL) 20 MG tablet Take 1 tablet (20 mg total) by mouth once daily.    memantine (NAMENDA XR) 28 mg CSpX Take 28 mg by mouth once daily.    metoprolol succinate (TOPROL-XL) 50 MG 24 hr tablet Take 50 mg by mouth once daily.    pantoprazole (PROTONIX) 40 MG tablet Take 1 tablet (40 mg total) by mouth before breakfast.    pravastatin (PRAVACHOL) 40 MG tablet Take 40 mg by mouth every evening.     Family History     None        Tobacco Use    Smoking status: Unknown If Ever Smoked   Substance and Sexual Activity    Alcohol use: Not on file    Drug use: Not on file    Sexual activity: Not on file     Review of Systems   Unable to perform ROS: Dementia   Constitutional: Positive for fatigue. Negative for chills and fever.   HENT: Negative for congestion.    Respiratory: Positive for cough and shortness of breath.    Cardiovascular: Positive for leg swelling. Negative for chest pain.   Gastrointestinal: Negative for abdominal pain, constipation, diarrhea, nausea and vomiting.   Endocrine: Positive for cold intolerance.   Genitourinary: Negative for dysuria.   Musculoskeletal: Positive for arthralgias and back pain.   Skin: Positive for color change.   Allergic/Immunologic: Negative for immunocompromised state.   Neurological: Negative for dizziness and weakness.   Hematological: Does not bruise/bleed easily.   Psychiatric/Behavioral: Positive for confusion and decreased concentration. The patient is not nervous/anxious.      Objective:     Vital Signs (Most Recent):  Temp: 98 °F (36.7 °C) (08/10/20 0424)  Pulse: 62 (08/10/20 0424)  Resp: 18 (08/10/20 0424)  BP: (!) 105/56 (08/10/20 0424)  SpO2: (!) 92 % (08/10/20 0424) Vital Signs (24h Range):  Temp:  [98 °F (36.7 °C)-98.5 °F (36.9 °C)] 98 °F (36.7 °C)  Pulse:  [] 62  Resp:  [18-42] 18  SpO2:  [90 %-99 %] 92 %  BP: (105-131)/(52-66) 105/56      Weight: (!) 139 kg (306 lb 7 oz)  Body mass index is 43.97 kg/m².    Physical Exam  Vitals signs and nursing note reviewed.   Constitutional:       General: She is not in acute distress.     Appearance: She is well-developed. She is morbidly obese. She is ill-appearing. She is not toxic-appearing or diaphoretic.   HENT:      Head: Normocephalic and atraumatic.      Mouth/Throat:      Pharynx: No oropharyngeal exudate.   Eyes:      General: No scleral icterus.        Right eye: No discharge.         Left eye: No discharge.      Conjunctiva/sclera: Conjunctivae normal.      Pupils: Pupils are equal, round, and reactive to light.   Neck:      Musculoskeletal: Normal range of motion and neck supple.      Thyroid: No thyromegaly.      Vascular: No JVD.      Trachea: No tracheal deviation.   Cardiovascular:      Rate and Rhythm: Normal rate and regular rhythm.      Heart sounds: Normal heart sounds. No murmur. No friction rub. No gallop.    Pulmonary:      Effort: Pulmonary effort is normal. No respiratory distress.      Breath sounds: Decreased air movement present. No stridor. Examination of the right-lower field reveals rales. Examination of the left-lower field reveals rales. Decreased breath sounds and rales present. No wheezing or rhonchi.   Chest:      Chest wall: No tenderness.   Abdominal:      General: Bowel sounds are normal. There is no distension.      Palpations: Abdomen is soft. There is no mass.      Tenderness: There is no abdominal tenderness. There is no guarding or rebound.   Genitourinary:     Comments: No null in place  Musculoskeletal:         General: No tenderness.      Right ankle: She exhibits decreased range of motion.      Comments: Right ankle in brace and wrapped   Lymphadenopathy:      Cervical: No cervical adenopathy.      Comments: Trace edema to lower legs   Skin:     General: Skin is cool and moist.      Coloration: Skin is not pale.      Findings: No erythema or rash.    Neurological:      Mental Status: She is oriented to person, place, and time. She is lethargic.      GCS: GCS eye subscore is 4. GCS verbal subscore is 5. GCS motor subscore is 6.      Cranial Nerves: No cranial nerve deficit.      Motor: No abnormal muscle tone.      Coordination: Coordination normal.      Deep Tendon Reflexes: Reflexes normal.   Psychiatric:         Behavior: Behavior normal.         Thought Content: Thought content normal.         Cognition and Memory: Cognition is impaired. Memory is impaired. She exhibits impaired recent memory and impaired remote memory.         Judgment: Judgment normal.           CRANIAL NERVES     CN III, IV, VI   Pupils are equal, round, and reactive to light.       Significant Labs:   Recent Results (from the past 24 hour(s))   COVID-19 Rapid Screening    Collection Time: 08/09/20  5:33 PM   Result Value Ref Range    SARS-CoV-2 RNA, Amplification, Qual Negative Negative   CBC auto differential    Collection Time: 08/09/20  5:53 PM   Result Value Ref Range    WBC 7.06 3.90 - 12.70 K/uL    RBC 3.92 (L) 4.00 - 5.40 M/uL    Hemoglobin 11.0 (L) 12.0 - 16.0 g/dL    Hematocrit 37.3 37.0 - 48.5 %    Mean Corpuscular Volume 95 82 - 98 fL    Mean Corpuscular Hemoglobin 28.1 27.0 - 31.0 pg    Mean Corpuscular Hemoglobin Conc 29.5 (L) 32.0 - 36.0 g/dL    RDW 16.0 (H) 11.5 - 14.5 %    Platelets 363 (H) 150 - 350 K/uL    MPV 8.6 (L) 9.2 - 12.9 fL    Immature Granulocytes 0.7 (H) 0.0 - 0.5 %    Gran # (ANC) 6.6 1.8 - 7.7 K/uL    Immature Grans (Abs) 0.05 (H) 0.00 - 0.04 K/uL    Lymph # 0.3 (L) 1.0 - 4.8 K/uL    Mono # 0.0 (L) 0.3 - 1.0 K/uL    Eos # 0.0 0.0 - 0.5 K/uL    Baso # 0.02 0.00 - 0.20 K/uL    nRBC 0 0 /100 WBC    Gran% 93.6 (H) 38.0 - 73.0 %    Lymph% 4.4 (L) 18.0 - 48.0 %    Mono% 0.6 (L) 4.0 - 15.0 %    Eosinophil% 0.4 0.0 - 8.0 %    Basophil% 0.3 0.0 - 1.9 %    Differential Method Automated    Comprehensive metabolic panel    Collection Time: 08/09/20  5:53 PM   Result  Value Ref Range    Sodium 137 136 - 145 mmol/L    Potassium 4.2 3.5 - 5.1 mmol/L    Chloride 97 95 - 110 mmol/L    CO2 30 (H) 23 - 29 mmol/L    Glucose 108 70 - 110 mg/dL    BUN, Bld 14 8 - 23 mg/dL    Creatinine 1.1 0.5 - 1.4 mg/dL    Calcium 9.7 8.7 - 10.5 mg/dL    Total Protein 7.8 6.0 - 8.4 g/dL    Albumin 2.0 (L) 3.5 - 5.2 g/dL    Total Bilirubin 0.4 0.1 - 1.0 mg/dL    Alkaline Phosphatase 115 55 - 135 U/L    AST 25 10 - 40 U/L    ALT 23 10 - 44 U/L    Anion Gap 10 8 - 16 mmol/L    eGFR if African American 58.4 (A) >60 mL/min/1.73 m^2    eGFR if non African American 50.6 (A) >60 mL/min/1.73 m^2   D dimer, quantitative    Collection Time: 08/09/20  5:53 PM   Result Value Ref Range    D-Dimer >33.00 (H) <0.50 mg/L FEU   Lactic acid, plasma    Collection Time: 08/09/20  5:53 PM   Result Value Ref Range    Lactate (Lactic Acid) 3.2 (H) 0.5 - 2.2 mmol/L   Magnesium    Collection Time: 08/09/20  5:53 PM   Result Value Ref Range    Magnesium 1.7 1.6 - 2.6 mg/dL   Protime-INR    Collection Time: 08/09/20  5:53 PM   Result Value Ref Range    Prothrombin Time 12.5 9.0 - 12.5 sec    INR 1.2 0.8 - 1.2   Troponin I    Collection Time: 08/09/20  5:53 PM   Result Value Ref Range    Troponin I 0.142 (H) 0.000 - 0.026 ng/mL   TSH    Collection Time: 08/09/20  5:53 PM   Result Value Ref Range    TSH 1.040 0.400 - 4.000 uIU/mL   NT-Pro Natriuretic Peptide    Collection Time: 08/09/20  5:54 PM   Result Value Ref Range    NT-proBNP 729 5 - 900 pg/mL   Troponin I    Collection Time: 08/09/20  7:43 PM   Result Value Ref Range    Troponin I 0.877 (H) 0.000 - 0.026 ng/mL   Troponin I    Collection Time: 08/09/20 10:13 PM   Result Value Ref Range    Troponin I 1.660 (H) 0.000 - 0.026 ng/mL   POCT ARTERIAL BLOOD GAS    Collection Time: 08/10/20  1:44 AM   Result Value Ref Range    POC PH 7.411 7.345 - 7.450    POC PCO2 51.9 (H) 35.0 - 45.0 mmHg    POC PO2 51.0 (LL) 80.0 - 100 mmHg    POC SATURATED O2 86.8 %    Correct Temperature (PH)  7.411     Corrected Temperature (pCO2) 51.9 mmHg    Corrected Temperature (pO2) 51.0 mmHg    POC HCO3 30.8 mmol/l    Base Deficit 8.3 mmol/l    POC TCO2 30.7 mmol/l    POC Temp 37.0 C    Specimen source Arterial     Performed By: Karel     FiO2 50.0 %    O2DEVICE BIPAP     BIPAP 14/5     Provider Notified: dr [email or web address]     Notified Time 08/10/2020 01:45:00     Notified By Karel      Significant Imaging:     CXR: personal interpretation  Bibasilar infiltrates, poor inspiration  Left pleural effusion    CTA chest (verbal report from sending MD):  Large hiatal hernia  Negative for PTE

## 2020-08-10 NOTE — H&P
"[facility] Medicine  History & Physical    Patient Name: Martina Betancourt  MRN: [id number]  Admission Date: 8/10/2020  Attending Physician: LUIS Avery MD   Primary Care Provider: Joe Fuller Iii, MD         Patient information was obtained from patient, past medical records and ER records.     Subjective:     Principal Problem:Acute respiratory failure with hypoxia and hypercarbia    Chief Complaint: "I guess I just got really short of breath at the nursing home."       HPI: Ms. Betancourt is a 70yo lady with morbid obesity, YOVANI, alzheimers and HTN who is residing in a nursing home currently due to a fractured right ankle.  She cannot remember the day's events, but per EMS reports, "sats were in the 50s when they got the nursing home.  A breathing treatment was given the nursing home and placed on 100% non-rebreather by EMS.  Patient is a DNR and is a patient of Dr. Fuller."    She was taken to [facility] and underwent work up with CTA, which was negative for PTE.  She was found to have an elevated trop and BNP though.  She was begun on NSTEMI therapy and placed on BiPAP.  She was transferred here for further management.  The patient denies any CP to me.  She states she does feel SOB.      Past Medical History:   Diagnosis Date    Alzheimer disease     Alzheimer disease     Coronary artery disease     Hyperlipidemia     Hypertension     Obesity     Sleep apnea        Past Surgical History:   Procedure Laterality Date    APPENDECTOMY      APPENDECTOMY      CHOLECYSTECTOMY      CORONARY STENT PLACEMENT      HYSTERECTOMY      TONSILLECTOMY         Review of patient's allergies indicates:   Allergen Reactions    Hydroxyzine     Tizanidine        Current Facility-Administered Medications on File Prior to Encounter   Medication    [COMPLETED] albuterol-ipratropium 2.5 mg-0.5 mg/3 mL nebulizer solution 3 mL    [COMPLETED] aspirin tablet 325 mg    [COMPLETED] " clopidogreL tablet 300 mg    [COMPLETED] enoxaparin injection 100 mg    [COMPLETED] furosemide injection 20 mg    [COMPLETED] iohexoL (OMNIPAQUE 350) injection 100 mL    [COMPLETED] metoprolol injection 2.5 mg    [COMPLETED] morphine injection 4 mg    [COMPLETED] ondansetron injection 4 mg     Current Outpatient Medications on File Prior to Encounter   Medication Sig    acetaminophen (TYLENOL) 325 MG tablet Take 2 tablets (650 mg total) by mouth every 6 (six) hours as needed (HEADACHE, TEMPERATURE - FEVER > 100.4).    albuterol-ipratropium (DUO-NEB) 2.5 mg-0.5 mg/3 mL nebulizer solution Take 3 mLs by nebulization 3 (three) times daily. Rescue    albuterol-ipratropium (DUO-NEB) 2.5 mg-0.5 mg/3 mL nebulizer solution Take 3 mLs by nebulization every 2 (two) hours as needed for Wheezing or Shortness of Breath. Rescue    aspirin (ECOTRIN) 81 MG EC tablet Take 81 mg by mouth once daily.    donepeziL (ARICEPT) 10 MG tablet Take 10 mg by mouth every evening.    DULoxetine (CYMBALTA) 60 MG capsule Take 60 mg by mouth once daily.    ferrous sulfate 324 mg (65 mg iron) TbEC Take 325 mg by mouth every evening.    HYDROcodone-acetaminophen (NORCO)  mg per tablet Take 1 tablet by mouth every 4 (four) hours as needed.    isosorbide mononitrate (IMDUR) 60 MG 24 hr tablet Take 60 mg by mouth once daily.    lisinopriL (PRINIVIL,ZESTRIL) 20 MG tablet Take 1 tablet (20 mg total) by mouth once daily.    memantine (NAMENDA XR) 28 mg CSpX Take 28 mg by mouth once daily.    metoprolol succinate (TOPROL-XL) 50 MG 24 hr tablet Take 50 mg by mouth once daily.    pantoprazole (PROTONIX) 40 MG tablet Take 1 tablet (40 mg total) by mouth before breakfast.    pravastatin (PRAVACHOL) 40 MG tablet Take 40 mg by mouth every evening.     Family History     None        Tobacco Use    Smoking status: Unknown If Ever Smoked   Substance and Sexual Activity    Alcohol use: Not on file    Drug use: Not on file    Sexual  activity: Not on file     Review of Systems   Unable to perform ROS: Dementia   Constitutional: Positive for fatigue. Negative for chills and fever.   HENT: Negative for congestion.    Respiratory: Positive for cough and shortness of breath.    Cardiovascular: Positive for leg swelling. Negative for chest pain.   Gastrointestinal: Negative for abdominal pain, constipation, diarrhea, nausea and vomiting.   Endocrine: Positive for cold intolerance.   Genitourinary: Negative for dysuria.   Musculoskeletal: Positive for arthralgias and back pain.   Skin: Positive for color change.   Allergic/Immunologic: Negative for immunocompromised state.   Neurological: Negative for dizziness and weakness.   Hematological: Does not bruise/bleed easily.   Psychiatric/Behavioral: Positive for confusion and decreased concentration. The patient is not nervous/anxious.      Objective:     Vital Signs (Most Recent):  Temp: 98 °F (36.7 °C) (08/10/20 0424)  Pulse: 62 (08/10/20 0424)  Resp: 18 (08/10/20 0424)  BP: (!) 105/56 (08/10/20 0424)  SpO2: (!) 92 % (08/10/20 0424) Vital Signs (24h Range):  Temp:  [98 °F (36.7 °C)-98.5 °F (36.9 °C)] 98 °F (36.7 °C)  Pulse:  [] 62  Resp:  [18-42] 18  SpO2:  [90 %-99 %] 92 %  BP: (105-131)/(52-66) 105/56     Weight: (!) 139 kg (306 lb 7 oz)  Body mass index is 43.97 kg/m².    Physical Exam  Vitals signs and nursing note reviewed.   Constitutional:       General: She is not in acute distress.     Appearance: She is well-developed. She is morbidly obese. She is ill-appearing. She is not toxic-appearing or diaphoretic.   HENT:      Head: Normocephalic and atraumatic.      Mouth/Throat:      Pharynx: No oropharyngeal exudate.   Eyes:      General: No scleral icterus.        Right eye: No discharge.         Left eye: No discharge.      Conjunctiva/sclera: Conjunctivae normal.      Pupils: Pupils are equal, round, and reactive to light.   Neck:      Musculoskeletal: Normal range of motion and neck  supple.      Thyroid: No thyromegaly.      Vascular: No JVD.      Trachea: No tracheal deviation.   Cardiovascular:      Rate and Rhythm: Normal rate and regular rhythm.      Heart sounds: Normal heart sounds. No murmur. No friction rub. No gallop.    Pulmonary:      Effort: Pulmonary effort is normal. No respiratory distress.      Breath sounds: Decreased air movement present. No stridor. Examination of the right-lower field reveals rales. Examination of the left-lower field reveals rales. Decreased breath sounds and rales present. No wheezing or rhonchi.   Chest:      Chest wall: No tenderness.   Abdominal:      General: Bowel sounds are normal. There is no distension.      Palpations: Abdomen is soft. There is no mass.      Tenderness: There is no abdominal tenderness. There is no guarding or rebound.   Genitourinary:     Comments: No null in place  Musculoskeletal:         General: No tenderness.      Right ankle: She exhibits decreased range of motion.      Comments: Right ankle in brace and wrapped   Lymphadenopathy:      Cervical: No cervical adenopathy.      Comments: Trace edema to lower legs   Skin:     General: Skin is cool and moist.      Coloration: Skin is not pale.      Findings: No erythema or rash.   Neurological:      Mental Status: She is oriented to person, place, and time. She is lethargic.      GCS: GCS eye subscore is 4. GCS verbal subscore is 5. GCS motor subscore is 6.      Cranial Nerves: No cranial nerve deficit.      Motor: No abnormal muscle tone.      Coordination: Coordination normal.      Deep Tendon Reflexes: Reflexes normal.   Psychiatric:         Behavior: Behavior normal.         Thought Content: Thought content normal.         Cognition and Memory: Cognition is impaired. Memory is impaired. She exhibits impaired recent memory and impaired remote memory.         Judgment: Judgment normal.           CRANIAL NERVES     CN III, IV, VI   Pupils are equal, round, and reactive to  light.       Significant Labs:   Recent Results (from the past 24 hour(s))   COVID-19 Rapid Screening    Collection Time: 08/09/20  5:33 PM   Result Value Ref Range    SARS-CoV-2 RNA, Amplification, Qual Negative Negative   CBC auto differential    Collection Time: 08/09/20  5:53 PM   Result Value Ref Range    WBC 7.06 3.90 - 12.70 K/uL    RBC 3.92 (L) 4.00 - 5.40 M/uL    Hemoglobin 11.0 (L) 12.0 - 16.0 g/dL    Hematocrit 37.3 37.0 - 48.5 %    Mean Corpuscular Volume 95 82 - 98 fL    Mean Corpuscular Hemoglobin 28.1 27.0 - 31.0 pg    Mean Corpuscular Hemoglobin Conc 29.5 (L) 32.0 - 36.0 g/dL    RDW 16.0 (H) 11.5 - 14.5 %    Platelets 363 (H) 150 - 350 K/uL    MPV 8.6 (L) 9.2 - 12.9 fL    Immature Granulocytes 0.7 (H) 0.0 - 0.5 %    Gran # (ANC) 6.6 1.8 - 7.7 K/uL    Immature Grans (Abs) 0.05 (H) 0.00 - 0.04 K/uL    Lymph # 0.3 (L) 1.0 - 4.8 K/uL    Mono # 0.0 (L) 0.3 - 1.0 K/uL    Eos # 0.0 0.0 - 0.5 K/uL    Baso # 0.02 0.00 - 0.20 K/uL    nRBC 0 0 /100 WBC    Gran% 93.6 (H) 38.0 - 73.0 %    Lymph% 4.4 (L) 18.0 - 48.0 %    Mono% 0.6 (L) 4.0 - 15.0 %    Eosinophil% 0.4 0.0 - 8.0 %    Basophil% 0.3 0.0 - 1.9 %    Differential Method Automated    Comprehensive metabolic panel    Collection Time: 08/09/20  5:53 PM   Result Value Ref Range    Sodium 137 136 - 145 mmol/L    Potassium 4.2 3.5 - 5.1 mmol/L    Chloride 97 95 - 110 mmol/L    CO2 30 (H) 23 - 29 mmol/L    Glucose 108 70 - 110 mg/dL    BUN, Bld 14 8 - 23 mg/dL    Creatinine 1.1 0.5 - 1.4 mg/dL    Calcium 9.7 8.7 - 10.5 mg/dL    Total Protein 7.8 6.0 - 8.4 g/dL    Albumin 2.0 (L) 3.5 - 5.2 g/dL    Total Bilirubin 0.4 0.1 - 1.0 mg/dL    Alkaline Phosphatase 115 55 - 135 U/L    AST 25 10 - 40 U/L    ALT 23 10 - 44 U/L    Anion Gap 10 8 - 16 mmol/L    eGFR if African American 58.4 (A) >60 mL/min/1.73 m^2    eGFR if non African American 50.6 (A) >60 mL/min/1.73 m^2   D dimer, quantitative    Collection Time: 08/09/20  5:53 PM   Result Value Ref Range    D-Dimer  >33.00 (H) <0.50 mg/L FEU   Lactic acid, plasma    Collection Time: 08/09/20  5:53 PM   Result Value Ref Range    Lactate (Lactic Acid) 3.2 (H) 0.5 - 2.2 mmol/L   Magnesium    Collection Time: 08/09/20  5:53 PM   Result Value Ref Range    Magnesium 1.7 1.6 - 2.6 mg/dL   Protime-INR    Collection Time: 08/09/20  5:53 PM   Result Value Ref Range    Prothrombin Time 12.5 9.0 - 12.5 sec    INR 1.2 0.8 - 1.2   Troponin I    Collection Time: 08/09/20  5:53 PM   Result Value Ref Range    Troponin I 0.142 (H) 0.000 - 0.026 ng/mL   TSH    Collection Time: 08/09/20  5:53 PM   Result Value Ref Range    TSH 1.040 0.400 - 4.000 uIU/mL   NT-Pro Natriuretic Peptide    Collection Time: 08/09/20  5:54 PM   Result Value Ref Range    NT-proBNP 729 5 - 900 pg/mL   Troponin I    Collection Time: 08/09/20  7:43 PM   Result Value Ref Range    Troponin I 0.877 (H) 0.000 - 0.026 ng/mL   Troponin I    Collection Time: 08/09/20 10:13 PM   Result Value Ref Range    Troponin I 1.660 (H) 0.000 - 0.026 ng/mL   POCT ARTERIAL BLOOD GAS    Collection Time: 08/10/20  1:44 AM   Result Value Ref Range    POC PH 7.411 7.345 - 7.450    POC PCO2 51.9 (H) 35.0 - 45.0 mmHg    POC PO2 51.0 (LL) 80.0 - 100 mmHg    POC SATURATED O2 86.8 %    Correct Temperature (PH) 7.411     Corrected Temperature (pCO2) 51.9 mmHg    Corrected Temperature (pO2) 51.0 mmHg    POC HCO3 30.8 mmol/l    Base Deficit 8.3 mmol/l    POC TCO2 30.7 mmol/l    POC Temp 37.0 C    Specimen source Arterial     Performed By: Karel     FiO2 50.0 %    O2DEVICE BIPAP     BIPAP 14/5     Provider Notified: dr nicholas     Notified Time 08/10/2020 01:45:00     Notified By Karel      Significant Imaging:     CXR: personal interpretation  Bibasilar infiltrates, poor inspiration  Left pleural effusion    CTA chest (verbal report from sending MD):  Large hiatal hernia  Negative for PTE    Assessment/Plan:     * Acute respiratory failure with hypoxia and hypercarbia  Per sending MD (I am unable to see  "the report):   -CTA was negative for PTE   -Large hiatal hernia was present   -Edema lung bases  Her BNP is elevated, but no echo on file for EF   -Abrupt rise in troponin, so acute cardiac ischemia and failure is other possibility  She has no fever, chills, leukocytosis to suggest PNA   -Given abrupt onset and dementia, will treat with Levaquin in case of aspiration  YOVANI and hypoventilation could be playing a part as well   -Currently on BiPAP at 12/5 and 50%   -Stat ABG now  Duonebs q6 hours scheduled and prn q4 hours      NSTEMI (non-ST elevated myocardial infarction)  Her troponin is rapidly rising:   -0.142 -> 0.877 ->1.66  Loaded with Plavix at outside ED   -Continue plavix 75mg po qday  Given ASA 325mg x 1 at outside ED   -Continue ASA 81mg po qday  Lovenox 1mg/Kg given at outside ED at 8:30pm 8/9/20   -Continue with Lovenox 135mg SQ q12 hours at 9am  Telemetry, Cardiology consult  Given age, degree of illness and DNR, recommend against LHC   -Likely better for medical management  Check Echo         Dementia without behavioral disturbance  Consult SLP for swallow eval  Continue home Aricept and Namenda  Aspiration precautions      Obstructive sleep apnea treated with continuous positive airway pressure (CPAP)  She is supposed to be using he CPAP, but states she, "hasn't used it in 3 years"  On BiPAP currently      Severe obesity (BMI >= 40)  Contributing to YVOANI and atelectasis  Encourage weight loss      Do not resuscitate  As noted by sending MD, "Patient is a DNR and is a patient of Dr. Fuller."  She also came with a signed LaPOST stating DNR  The patient confirmed with the nurse again she wanted to be DNR  She is willing to take fluids, trial of BiPAP and antibiotics though    Closed high lateral malleolus fracture, right, with delayed healing, subsequent encounter  Currently wrapped and in brace  PT and OT consults        VTE Risk Mitigation (From admission, onward)         Ordered     enoxaparin injection " 135 mg  Every 12 hours (non-standard times)      08/10/20 0432                   W Paul Avery MD  Department of Hospital Medicine   [facility]

## 2020-08-10 NOTE — PLAN OF CARE
08/10/20 1558   Discharge Assessment   Assessment Type Discharge Planning Assessment   Confirmed/corrected address and phone number on facesheet? Yes   Assessment information obtained from? Patient   Communicated expected length of stay with patient/caregiver yes   Prior to hospitilization cognitive status: Alert/Oriented   Prior to hospitalization functional status: Assistive Equipment   Current cognitive status: Alert/Oriented   Current Functional Status: Assistive Equipment   Facility Arrived From: Home   Lives With alone  (According to pt, she lives alone but her daughter will move in on the 1st of the month.)   Able to Return to Prior Arrangements yes   Is patient able to care for self after discharge? Yes   Who are your caregiver(s) and their phone number(s)? Alexsandra, pt's daughter, [phone]   Patient's perception of discharge disposition skilled nursing facility   Readmission Within the Last 30 Days no previous admission in last 30 days   Patient currently being followed by outpatient case management? No   Patient currently receives any other outside agency services? No   Equipment Currently Used at Home power chair   Do you have any problems affording any of your prescribed medications? No   Is the patient taking medications as prescribed? yes   Does the patient have transportation home? Yes   Transportation Anticipated family or friend will provide   Dialysis Name and Scheduled days N/A   Does the patient receive services at the [facility]? No   Discharge Plan A Skilled Nursing Facility   Discharge Plan B Home with family;Home Health   DME Needed Upon Discharge  none   Patient/Family in Agreement with Plan yes     SW to patient's room to discuss Helping the patient manage care at home.   TN/SW role explained to pt.  Patient identified using two identifiers:  Name and date of birth.    SW's name and contact info placed on white board.     Person who will help at home if needed:  Alexsandra pt's daughter.  "    "Help at home Questions" discussed and placed in "My Health Packet" and placed at bedside.     Preferred Appointment time: Morning appts.        "

## 2020-08-10 NOTE — ASSESSMENT & PLAN NOTE
"As noted by sending MD, "Patient is a DNR and is a patient of Dr. Fuller."  She also came with a signed LaPOST stating DNR  The patient confirmed with the nurse again she wanted to be DNR  She is willing to take fluids, trial of BiPAP and antibiotics though  "

## 2020-08-10 NOTE — ASSESSMENT & PLAN NOTE
Per sending MD (I am unable to see the report):   -CTA was negative for PTE   -Large hiatal hernia was present   -Edema lung bases  Her BNP is elevated, but no echo on file for EF   -Abrupt rise in troponin, so acute cardiac ischemia and failure is other possibility  She has no fever, chills, leukocytosis to suggest PNA   -Given abrupt onset and dementia, will treat with Levaquin in case of aspiration  YOVANI and hypoventilation could be playing a part as well   -Currently on BiPAP at 12/5 and 50%   -Stat ABG now  Duonebs q6 hours scheduled and prn q4 hours

## 2020-08-10 NOTE — CARE UPDATE
Patient has been seen and evaluated,patient is admitted  for acute on chronic hypoxic,hypercapnic respiratory failure,with history of YOVANI,non complaint with CPAP at home,has been started in Bipap over night,repeat ABG show improvement,changed bipap to QHS,stable on NC O 2,alert,has elevated Trponin with no chest pain or EKG changed,cardiology is following,will have Echo,only medical treatment recommended.consulted wound care for below  breast wounds,started on nystatin,will do X ray right Ankle for history of fracture,consulted ortho,consulted PT,OT,ST.

## 2020-08-10 NOTE — CONSULTS
Consulted for wound below breast  A 69 year old female admitted today from NH with acute respiratory failure with hypoxia and hypercarbia; NSTEMI; dementia without behavioral disturbance; YOVANI treated with CPAP; severe obesity; DNR; closed high lateral malleolus fracture right with delayed healing  PMH: Alzheimer's disease; CAD; HLD; HTN; obesity; sleep apnea  8/9 WBC 7.06 Hgb 11.0 Hct 37.3 Alb 2.0   Assessment:  Linear skin breakdown under left breast with newly healed area under right breast.   Patient reports treatment of area of skin breakdown for a couple of weeks at nursing home. Has been at nursing home since fracturing right ankle.  Treatment:  Local wound care to skin breakdown under breast twice a day with CriticAid AF (2% miconazole ointment).   Treatment plan discussed with patient.

## 2020-08-11 PROBLEM — S21.109A WOUND, OPEN, CHEST WALL WITH COMPLICATION: Status: ACTIVE | Noted: 2020-08-11

## 2020-08-11 LAB
ANION GAP SERPL CALC-SCNC: 7 MMOL/L (ref 8–16)
BASOPHILS # BLD AUTO: 0.03 K/UL (ref 0–0.2)
BASOPHILS NFR BLD: 0.2 % (ref 0–1.9)
BUN SERPL-MCNC: 20 MG/DL (ref 8–23)
CALCIUM SERPL-MCNC: 9 MG/DL (ref 8.7–10.5)
CHLORIDE SERPL-SCNC: 96 MMOL/L (ref 95–110)
CO2 SERPL-SCNC: 33 MMOL/L (ref 23–29)
CREAT SERPL-MCNC: 1.2 MG/DL (ref 0.5–1.4)
DIFFERENTIAL METHOD: ABNORMAL
EOSINOPHIL # BLD AUTO: 0 K/UL (ref 0–0.5)
EOSINOPHIL NFR BLD: 0.3 % (ref 0–8)
ERYTHROCYTE [DISTWIDTH] IN BLOOD BY AUTOMATED COUNT: 16 % (ref 11.5–14.5)
EST. GFR  (AFRICAN AMERICAN): 53 ML/MIN/1.73 M^2
EST. GFR  (NON AFRICAN AMERICAN): 46 ML/MIN/1.73 M^2
GLUCOSE SERPL-MCNC: 82 MG/DL (ref 70–110)
HCT VFR BLD AUTO: 29.4 % (ref 37–48.5)
HGB BLD-MCNC: 8.9 G/DL (ref 12–16)
IMM GRANULOCYTES # BLD AUTO: 0.05 K/UL (ref 0–0.04)
IMM GRANULOCYTES NFR BLD AUTO: 0.4 % (ref 0–0.5)
LYMPHOCYTES # BLD AUTO: 1.9 K/UL (ref 1–4.8)
LYMPHOCYTES NFR BLD: 14.5 % (ref 18–48)
MAGNESIUM SERPL-MCNC: 2 MG/DL (ref 1.6–2.6)
MCH RBC QN AUTO: 28.9 PG (ref 27–31)
MCHC RBC AUTO-ENTMCNC: 30.3 G/DL (ref 32–36)
MCV RBC AUTO: 96 FL (ref 82–98)
MONOCYTES # BLD AUTO: 1.5 K/UL (ref 0.3–1)
MONOCYTES NFR BLD: 11.7 % (ref 4–15)
NEUTROPHILS # BLD AUTO: 9.5 K/UL (ref 1.8–7.7)
NEUTROPHILS NFR BLD: 72.9 % (ref 38–73)
NRBC BLD-RTO: 0 /100 WBC
PHOSPHATE SERPL-MCNC: 3.5 MG/DL (ref 2.7–4.5)
PLATELET # BLD AUTO: 322 K/UL (ref 150–350)
PMV BLD AUTO: 9 FL (ref 9.2–12.9)
POTASSIUM SERPL-SCNC: 4.1 MMOL/L (ref 3.5–5.1)
RBC # BLD AUTO: 3.08 M/UL (ref 4–5.4)
SODIUM SERPL-SCNC: 136 MMOL/L (ref 136–145)
WBC # BLD AUTO: 12.99 K/UL (ref 3.9–12.7)

## 2020-08-11 PROCEDURE — 99900035 HC TECH TIME PER 15 MIN (STAT)

## 2020-08-11 PROCEDURE — 94761 N-INVAS EAR/PLS OXIMETRY MLT: CPT

## 2020-08-11 PROCEDURE — 63600175 PHARM REV CODE 636 W HCPCS: Performed by: INTERNAL MEDICINE

## 2020-08-11 PROCEDURE — 25000242 PHARM REV CODE 250 ALT 637 W/ HCPCS: Performed by: INTERNAL MEDICINE

## 2020-08-11 PROCEDURE — 85025 COMPLETE CBC W/AUTO DIFF WBC: CPT

## 2020-08-11 PROCEDURE — 36415 COLL VENOUS BLD VENIPUNCTURE: CPT

## 2020-08-11 PROCEDURE — 84100 ASSAY OF PHOSPHORUS: CPT

## 2020-08-11 PROCEDURE — 25000003 PHARM REV CODE 250: Performed by: INTERNAL MEDICINE

## 2020-08-11 PROCEDURE — 80048 BASIC METABOLIC PNL TOTAL CA: CPT

## 2020-08-11 PROCEDURE — 83735 ASSAY OF MAGNESIUM: CPT

## 2020-08-11 PROCEDURE — 25000003 PHARM REV CODE 250: Performed by: HOSPITALIST

## 2020-08-11 PROCEDURE — 25000242 PHARM REV CODE 250 ALT 637 W/ HCPCS: Performed by: HOSPITALIST

## 2020-08-11 PROCEDURE — 27000221 HC OXYGEN, UP TO 24 HOURS

## 2020-08-11 PROCEDURE — 94640 AIRWAY INHALATION TREATMENT: CPT

## 2020-08-11 PROCEDURE — 21400001 HC TELEMETRY ROOM

## 2020-08-11 PROCEDURE — 97110 THERAPEUTIC EXERCISES: CPT

## 2020-08-11 RX ORDER — IPRATROPIUM BROMIDE AND ALBUTEROL SULFATE 2.5; .5 MG/3ML; MG/3ML
3 SOLUTION RESPIRATORY (INHALATION)
Status: DISCONTINUED | OUTPATIENT
Start: 2020-08-11 | End: 2020-08-12 | Stop reason: HOSPADM

## 2020-08-11 RX ORDER — LISINOPRIL 2.5 MG/1
2.5 TABLET ORAL DAILY
Status: DISCONTINUED | OUTPATIENT
Start: 2020-08-12 | End: 2020-08-12 | Stop reason: HOSPADM

## 2020-08-11 RX ORDER — ENOXAPARIN SODIUM 100 MG/ML
40 INJECTION SUBCUTANEOUS EVERY 24 HOURS
Status: DISCONTINUED | OUTPATIENT
Start: 2020-08-12 | End: 2020-08-12 | Stop reason: HOSPADM

## 2020-08-11 RX ORDER — MAG HYDROX/ALUMINUM HYD/SIMETH 200-200-20
30 SUSPENSION, ORAL (FINAL DOSE FORM) ORAL EVERY 6 HOURS PRN
Status: DISCONTINUED | OUTPATIENT
Start: 2020-08-11 | End: 2020-08-12 | Stop reason: HOSPADM

## 2020-08-11 RX ADMIN — ASPIRIN 81 MG 81 MG: 81 TABLET ORAL at 09:08

## 2020-08-11 RX ADMIN — MICONAZOLE NITRATE: 20 OINTMENT TOPICAL at 09:08

## 2020-08-11 RX ADMIN — DULOXETINE HYDROCHLORIDE 60 MG: 30 CAPSULE, DELAYED RELEASE ORAL at 09:08

## 2020-08-11 RX ADMIN — IPRATROPIUM BROMIDE AND ALBUTEROL SULFATE 3 ML: .5; 3 SOLUTION RESPIRATORY (INHALATION) at 12:08

## 2020-08-11 RX ADMIN — MEMANTINE HYDROCHLORIDE 10 MG: 5 TABLET ORAL at 09:08

## 2020-08-11 RX ADMIN — LISINOPRIL 20 MG: 20 TABLET ORAL at 09:08

## 2020-08-11 RX ADMIN — HYDROCODONE BITARTRATE AND ACETAMINOPHEN 1 TABLET: 5; 325 TABLET ORAL at 08:08

## 2020-08-11 RX ADMIN — FUROSEMIDE 40 MG: 40 TABLET ORAL at 09:08

## 2020-08-11 RX ADMIN — ISOSORBIDE MONONITRATE 60 MG: 30 TABLET, EXTENDED RELEASE ORAL at 09:08

## 2020-08-11 RX ADMIN — FERROUS SULFATE TAB EC 325 MG (65 MG FE EQUIVALENT) 325 MG: 325 (65 FE) TABLET DELAYED RESPONSE at 08:08

## 2020-08-11 RX ADMIN — HYDROCODONE BITARTRATE AND ACETAMINOPHEN 1 TABLET: 5; 325 TABLET ORAL at 07:08

## 2020-08-11 RX ADMIN — IPRATROPIUM BROMIDE AND ALBUTEROL SULFATE 3 ML: .5; 3 SOLUTION RESPIRATORY (INHALATION) at 08:08

## 2020-08-11 RX ADMIN — METOPROLOL SUCCINATE 50 MG: 50 TABLET, FILM COATED, EXTENDED RELEASE ORAL at 09:08

## 2020-08-11 RX ADMIN — HYDROCODONE BITARTRATE AND ACETAMINOPHEN 1 TABLET: 5; 325 TABLET ORAL at 01:08

## 2020-08-11 RX ADMIN — ENOXAPARIN SODIUM 135 MG: 150 INJECTION SUBCUTANEOUS at 09:08

## 2020-08-11 RX ADMIN — ATORVASTATIN CALCIUM 40 MG: 40 TABLET, FILM COATED ORAL at 08:08

## 2020-08-11 RX ADMIN — PANTOPRAZOLE SODIUM 40 MG: 40 TABLET, DELAYED RELEASE ORAL at 05:08

## 2020-08-11 RX ADMIN — LEVOFLOXACIN 750 MG: 750 INJECTION, SOLUTION INTRAVENOUS at 05:08

## 2020-08-11 RX ADMIN — MEMANTINE HYDROCHLORIDE 10 MG: 5 TABLET ORAL at 08:08

## 2020-08-11 RX ADMIN — CLOPIDOGREL 75 MG: 75 TABLET, FILM COATED ORAL at 09:08

## 2020-08-11 RX ADMIN — IPRATROPIUM BROMIDE AND ALBUTEROL SULFATE 3 ML: .5; 3 SOLUTION RESPIRATORY (INHALATION) at 02:08

## 2020-08-11 RX ADMIN — MAGNESIUM HYDROXIDE/ALUMINUM HYDROXICE/SIMETHICONE 30 ML: 120; 1200; 1200 SUSPENSION ORAL at 09:08

## 2020-08-11 RX ADMIN — DONEPEZIL HYDROCHLORIDE 10 MG: 10 TABLET, FILM COATED ORAL at 08:08

## 2020-08-11 RX ADMIN — MICONAZOLE NITRATE: 20 OINTMENT TOPICAL at 08:08

## 2020-08-11 RX ADMIN — HYDROCODONE BITARTRATE AND ACETAMINOPHEN 1 TABLET: 5; 325 TABLET ORAL at 12:08

## 2020-08-11 RX ADMIN — DOCUSATE SODIUM 50 MG AND SENNOSIDES 8.6 MG 1 TABLET: 8.6; 5 TABLET, FILM COATED ORAL at 01:08

## 2020-08-11 NOTE — PLAN OF CARE
Problem: Respiratory Compromise (Pneumonia)  Goal: Effective Oxygenation and Ventilation  Outcome: Ongoing, Progressing     Problem: Adult Inpatient Plan of Care  Goal: Plan of Care Review  Outcome: Ongoing, Progressing     Problem: Bariatric Environmental Safety  Goal: Safety Maintained with Care  Outcome: Ongoing, Progressing     Problem: Infection  Goal: Infection Symptom Resolution  Outcome: Ongoing, Progressing     Problem: Fall Injury Risk  Goal: Absence of Fall and Fall-Related Injury  Outcome: Ongoing, Progressing     Problem: Pain Acute  Goal: Optimal Pain Control  Outcome: Ongoing, Progressing    Pt remained free of injuries or falls throughout the shift. Vitals signs WNL. Pt is clean and dry. Nasal cannula intact with 3L of O2. NAD noted. Cam boot intact and BLE are elevated on pillows. Bed lowered and lock with call light within reach.

## 2020-08-11 NOTE — NURSING
Report received from LORAINE Munson. Visualized patient and assessed patient's overall condition and appearance. No acute distress noted. Will continue to monitor

## 2020-08-11 NOTE — PLAN OF CARE
08/11/20 0850   Post-Acute Status   Post-Acute Authorization Placement   Post-Acute Placement Status Pending Payor Review   Patient choice form signed by patient/caregiver   (Pt to return to [facility])   Discharge Plan   Discharge Plan A Skilled Nursing Facility     JOHN contacted pt's daughter, Alexsandra, at [phone] to discuss d/c planning. Alexsandra informed  pt was previously in SNF at [facility] and the d/c goal is for pt to return to SNF. According to Alexsandra, she is in the progress of moving in with pt to assist at home when pt is discharged from SNF. JOHN will follow-up with [facility] to confirm pt is eligible to return.     2:50pm  JOHN contacted [facility] at [phone] to confirm pt's residency. Cynthia confirmed pt is a resident and provided  with fax number to send clinicals. JOHN faxed PACKET, MAR, PT/OT notes, and wound care notes to NH at [phone].

## 2020-08-11 NOTE — ASSESSMENT & PLAN NOTE
"She is supposed to be using he CPAP, but states she, "hasn't used it in 3 years"  Improving with nightly bipap.    "

## 2020-08-11 NOTE — ASSESSMENT & PLAN NOTE
Per sending MD (I am unable to see the report):   -CTA was negative for PTE   -Large hiatal hernia was present   -Edema lung bases  Her BNP is elevated, but no echo on file for EF   -Abrupt rise in troponin, so acute cardiac ischemia and failure is other possibility  She has no fever, chills, leukocytosis to suggest PNA   -Given abrupt onset and dementia, will treat with Levaquin in case of aspiration  YOVANI and hypoventilation could be playing a part as well   -Currently on BiPAP at 12/5 and 50%    ABG   Duonebs q6 hours scheduled and prn q4 hours   history of YOVANI,non complaint with CPAP at home,has been started in Bipap over night,repeat ABG show improvement,changed bipap to QHS,stable on NC O 2,alert,

## 2020-08-11 NOTE — ASSESSMENT & PLAN NOTE
Currently wrapped and in brace  PT and OT consults       X ray right Ankle for show healing trimalleolar fracture,ortho. is following,boot is placed,    PT,OT is recommending SNF,SW is consulted.

## 2020-08-11 NOTE — PLAN OF CARE
Problem: Occupational Therapy Goal  Goal: Occupational Therapy Goal  Description: Goals to be met by: 8/24/20    Patient will increase functional independence with ADLs by performing:    UE Dressing with Modified [place] and Supervision.  Grooming while seated with Modified [place].  Sitting at edge of bed x20 minutes with Modified [place].  Rolling to Bilateral with Modified [place].   Supine to sit with Contact Guard Assistance.  Squat pivot transfers: TBA as able  Upper extremity exercise program x15 reps per handout, with independence.    Outcome: Ongoing, Progressing  The patient participated in UE therx in bed. The patient was limited by right shoulder weakness.   Patient will benefit from OT to address functional deficits.  The patient will benefit from SNF.

## 2020-08-11 NOTE — SUBJECTIVE & OBJECTIVE
Past Medical History:   Diagnosis Date    Alzheimer disease     Alzheimer disease     Coronary artery disease     Hyperlipidemia     Hypertension     Obesity     Sleep apnea        Past Surgical History:   Procedure Laterality Date    APPENDECTOMY      APPENDECTOMY      CHOLECYSTECTOMY      CORONARY STENT PLACEMENT      HYSTERECTOMY      TONSILLECTOMY         Review of patient's allergies indicates:   Allergen Reactions    Hydroxyzine     Tizanidine        No current facility-administered medications on file prior to encounter.      Current Outpatient Medications on File Prior to Encounter   Medication Sig    acetaminophen (TYLENOL) 325 MG tablet Take 2 tablets (650 mg total) by mouth every 6 (six) hours as needed (HEADACHE, TEMPERATURE - FEVER > 100.4).    albuterol-ipratropium (DUO-NEB) 2.5 mg-0.5 mg/3 mL nebulizer solution Take 3 mLs by nebulization 3 (three) times daily. Rescue    albuterol-ipratropium (DUO-NEB) 2.5 mg-0.5 mg/3 mL nebulizer solution Take 3 mLs by nebulization every 2 (two) hours as needed for Wheezing or Shortness of Breath. Rescue    aspirin (ECOTRIN) 81 MG EC tablet Take 81 mg by mouth once daily.    donepeziL (ARICEPT) 10 MG tablet Take 10 mg by mouth every evening.    DULoxetine (CYMBALTA) 60 MG capsule Take 60 mg by mouth once daily.    ferrous sulfate 324 mg (65 mg iron) TbEC Take 325 mg by mouth every evening.    HYDROcodone-acetaminophen (NORCO)  mg per tablet Take 1 tablet by mouth every 4 (four) hours as needed.    isosorbide mononitrate (IMDUR) 60 MG 24 hr tablet Take 60 mg by mouth once daily.    lisinopriL (PRINIVIL,ZESTRIL) 20 MG tablet Take 1 tablet (20 mg total) by mouth once daily.    memantine (NAMENDA XR) 28 mg CSpX Take 28 mg by mouth once daily.    metoprolol succinate (TOPROL-XL) 50 MG 24 hr tablet Take 50 mg by mouth once daily.    pantoprazole (PROTONIX) 40 MG tablet Take 1 tablet (40 mg total) by mouth before breakfast.    pravastatin  (PRAVACHOL) 40 MG tablet Take 40 mg by mouth every evening.     Family History     None        Tobacco Use    Smoking status: Unknown If Ever Smoked   Substance and Sexual Activity    Alcohol use: Not Currently    Drug use: Never    Sexual activity: Not Currently     Review of Systems   Unable to perform ROS: Dementia   Constitutional: Positive for fatigue. Negative for chills and fever.   HENT: Negative for congestion.    Respiratory: Positive for cough. Negative for shortness of breath.    Cardiovascular: Positive for leg swelling. Negative for chest pain.   Gastrointestinal: Negative for abdominal pain, constipation, diarrhea, nausea and vomiting.   Endocrine: Positive for cold intolerance.   Genitourinary: Negative for dysuria.   Musculoskeletal: Positive for arthralgias and back pain.   Skin: Positive for wound. Negative for color change.   Allergic/Immunologic: Negative for immunocompromised state.   Neurological: Negative for dizziness and weakness.   Hematological: Does not bruise/bleed easily.   Psychiatric/Behavioral: Negative for confusion and decreased concentration. The patient is not nervous/anxious.      Objective:     Vital Signs (Most Recent):  Temp: 97.1 °F (36.2 °C) (08/11/20 0757)  Pulse: 70 (08/11/20 0841)  Resp: 18 (08/11/20 0841)  BP: (!) 109/53 (08/11/20 0757)  SpO2: (!) 93 % (08/11/20 0841) Vital Signs (24h Range):  Temp:  [97.1 °F (36.2 °C)-98.5 °F (36.9 °C)] 97.1 °F (36.2 °C)  Pulse:  [] 70  Resp:  [16-21] 18  SpO2:  [78 %-98 %] 93 %  BP: (102-122)/(50-58) 109/53     Weight: (!) 148.1 kg (326 lb 8 oz)  Body mass index is 46.85 kg/m².    Physical Exam  Vitals signs and nursing note reviewed.   Constitutional:       General: She is not in acute distress.     Appearance: She is well-developed. She is morbidly obese. She is ill-appearing. She is not toxic-appearing or diaphoretic.   HENT:      Head: Normocephalic and atraumatic.      Mouth/Throat:      Pharynx: No oropharyngeal  exudate.   Eyes:      General: No scleral icterus.        Right eye: No discharge.         Left eye: No discharge.      Conjunctiva/sclera: Conjunctivae normal.      Pupils: Pupils are equal, round, and reactive to light.   Neck:      Musculoskeletal: Normal range of motion and neck supple.      Thyroid: No thyromegaly.      Vascular: No JVD.      Trachea: No tracheal deviation.   Cardiovascular:      Rate and Rhythm: Normal rate and regular rhythm.      Heart sounds: Normal heart sounds. No murmur. No friction rub. No gallop.    Pulmonary:      Effort: Pulmonary effort is normal. No respiratory distress.      Breath sounds: Decreased air movement present. No stridor. Examination of the right-lower field reveals rales. Examination of the left-lower field reveals rales. Decreased breath sounds and rales present. No wheezing or rhonchi.   Chest:      Chest wall: No tenderness.   Abdominal:      General: Bowel sounds are normal. There is no distension.      Palpations: Abdomen is soft. There is no mass.      Tenderness: There is no abdominal tenderness. There is no guarding or rebound.   Genitourinary:     Comments: No null in place  Musculoskeletal:         General: No tenderness.      Right ankle: She exhibits decreased range of motion.      Comments: Right ankle in brace and wrapped   Lymphadenopathy:      Cervical: No cervical adenopathy.      Comments: Trace edema to lower legs   Skin:     General: Skin is cool and moist.      Coloration: Skin is not pale.      Findings: No erythema or rash.      Comments: Right below breast wounds.   Neurological:      Mental Status: She is oriented to person, place, and time. She is lethargic.      GCS: GCS eye subscore is 4. GCS verbal subscore is 5. GCS motor subscore is 6.      Cranial Nerves: No cranial nerve deficit.      Motor: No abnormal muscle tone.      Coordination: Coordination normal.      Deep Tendon Reflexes: Reflexes normal.   Psychiatric:         Behavior:  Behavior normal.         Thought Content: Thought content normal.         Cognition and Memory: Cognition is impaired. Memory is impaired. She exhibits impaired recent memory and impaired remote memory.         Judgment: Judgment normal.           CRANIAL NERVES     CN III, IV, VI   Pupils are equal, round, and reactive to light.       Significant Labs:   Recent Results (from the past 24 hour(s))   Troponin I    Collection Time: 08/10/20 12:07 PM   Result Value Ref Range    Troponin I 0.625 (H) 0.000 - 0.026 ng/mL   Echo Color Flow Doppler? Yes; Bubble Contrast? No    Collection Time: 08/10/20 12:50 PM   Result Value Ref Range    BSA 2.62 m2    AORTIC VALVE CUSP SEPERATION 2.39 cm    PV PEAK VELOCITY 0.84 cm/s    Ao root annulus 3.69 cm    LA size 3.77 cm    TAPSE 1.86 cm    AV mean gradient 4 mmHg    AV valve area 2.88 cm2    AV Velocity Ratio 0.87     AV index (prosthetic) 0.74     MV valve area p 1/2 method 2.66 cm2    E/A ratio 1.28     E wave decelartion time 239.47 msec    IVRT 94.58 msec    LVOT diameter 2.22 cm    LVOT area 3.9 cm2    LVOT peak anderson 1.24 m/s    LVOT peak VTI 22.43 cm    Ao peak anderson 1.43 m/s    Ao VTI 30.18 cm    LVOT stroke volume 86.78 cm3    AV peak gradient 8 mmHg    MV Peak E Anderson 1.09 m/s    TR Max Anderson 1.58 m/s    MV stenosis pressure 1/2 time 82.59 ms    MV Peak A Anderson 0.85 m/s    RA Major Axis 5.06 cm    Left Atrium Major Axis 4.52 cm    Triscuspid Valve Regurgitation Peak Gradient 10 mmHg    RA Width 4.52 cm    Right Atrial Pressure (from IVC) 3 mmHg    TV rest pulmonary artery pressure 13 mmHg   Troponin I    Collection Time: 08/10/20  6:00 PM   Result Value Ref Range    Troponin I 0.429 (H) 0.000 - 0.026 ng/mL   Basic Metabolic Panel (BMP)    Collection Time: 08/11/20  4:53 AM   Result Value Ref Range    Sodium 136 136 - 145 mmol/L    Potassium 4.1 3.5 - 5.1 mmol/L    Chloride 96 95 - 110 mmol/L    CO2 33 (H) 23 - 29 mmol/L    Glucose 82 70 - 110 mg/dL    BUN, Bld 20 8 - 23 mg/dL     Creatinine 1.2 0.5 - 1.4 mg/dL    Calcium 9.0 8.7 - 10.5 mg/dL    Anion Gap 7 (L) 8 - 16 mmol/L    eGFR if African American 53 (A) >60 mL/min/1.73 m^2    eGFR if non African American 46 (A) >60 mL/min/1.73 m^2   Magnesium    Collection Time: 08/11/20  4:53 AM   Result Value Ref Range    Magnesium 2.0 1.6 - 2.6 mg/dL   Phosphorus    Collection Time: 08/11/20  4:53 AM   Result Value Ref Range    Phosphorus 3.5 2.7 - 4.5 mg/dL   CBC with Automated Differential    Collection Time: 08/11/20  4:53 AM   Result Value Ref Range    WBC 12.99 (H) 3.90 - 12.70 K/uL    RBC 3.08 (L) 4.00 - 5.40 M/uL    Hemoglobin 8.9 (L) 12.0 - 16.0 g/dL    Hematocrit 29.4 (L) 37.0 - 48.5 %    Mean Corpuscular Volume 96 82 - 98 fL    Mean Corpuscular Hemoglobin 28.9 27.0 - 31.0 pg    Mean Corpuscular Hemoglobin Conc 30.3 (L) 32.0 - 36.0 g/dL    RDW 16.0 (H) 11.5 - 14.5 %    Platelets 322 150 - 350 K/uL    MPV 9.0 (L) 9.2 - 12.9 fL    Immature Granulocytes 0.4 0.0 - 0.5 %    Gran # (ANC) 9.5 (H) 1.8 - 7.7 K/uL    Immature Grans (Abs) 0.05 (H) 0.00 - 0.04 K/uL    Lymph # 1.9 1.0 - 4.8 K/uL    Mono # 1.5 (H) 0.3 - 1.0 K/uL    Eos # 0.0 0.0 - 0.5 K/uL    Baso # 0.03 0.00 - 0.20 K/uL    nRBC 0 0 /100 WBC    Gran% 72.9 38.0 - 73.0 %    Lymph% 14.5 (L) 18.0 - 48.0 %    Mono% 11.7 4.0 - 15.0 %    Eosinophil% 0.3 0.0 - 8.0 %    Basophil% 0.2 0.0 - 1.9 %    Differential Method Automated      Significant Imaging:     CXR: personal interpretation  Bibasilar infiltrates, poor inspiration  Left pleural effusion    CTA chest (verbal report from sending MD):  Large hiatal hernia  Negative for PTE

## 2020-08-11 NOTE — NURSING
1908 Received bedside report from OH Herrera. Patient alert and oriented. Complaints of decreasing pain on her back - just had pain meds. No sign of distress. IV line intact - saline locked. Call bell given on her reach. Instructed to call for assistance all the time. Bed  On lowest position. Bed alarm on. Safety maintained.

## 2020-08-11 NOTE — ASSESSMENT & PLAN NOTE
Consult SLP for swallow eval,passed on diet.  Continue home Aricept and Namenda  Aspiration precautions

## 2020-08-11 NOTE — CONSULTS
Consulted for altered skin integrity of right heel  Patient seen yesterday for wound below breast and treatment plan developed.  8/10/20 Ortho consult for right trimal fracture about 1 month ago. Has been in splint since last hospitalization; splint not taken down since its placement  Ortho identified posterior heel pressure necrosis with non blanchable skin surrounding. No exposed bone or subcutaneous tissue.   Ortho recommendations:  Mepilex to heel; consult wound care for pressure wound over the heel; discontinue splint, place CAM boot; nonweightbearing right lower extremity; discharge planning for treatment of ankle and follow up with Ortho  Assessment:  EHOB boot in place to right lower extremity. Foam dressing in place over posterior heel.  Photodocumentation    Right posterior heel- Unstageable pressure injury 5 cm(L) 8 mm(W). Dry stable eschar with light erythema surrounding.     Right dorsal foot- Light pink erythema on dorsal foot with surrounding scattered areas of ecchymosis.  Patient reports splint was on right foot for > 1 month.   Patient unable to lift either foot off of bed.  Left heel- No pressure injury identified.  Skin under bilateral breast improving with antifungal barrier ointment. Less erythema and ointment managing moisture.   Treatment:  Nursing to get Cam boot for right foot and place as per Ortho plan  Continue monitoring pressure injury on left heel and offload pressure from heel.  Use EHOB boot on left lower extremity to offload pressure from heel.  Continue CriticAid AF under breast to treat fungus/manage moisture.

## 2020-08-11 NOTE — PLAN OF CARE
Problem: Pain Acute  Goal: Optimal Pain Control  Outcome: Ongoing, Progressing  Intervention: Develop Pain Management Plan  Flowsheets (Taken 8/11/2020 0427)  Pain Management Interventions:   awakened for pain meds per patient request   around-the-clock dosing utilized   relaxation techniques promoted   quiet environment facilitated   position adjusted  Intervention: Prevent or Manage Pain  Flowsheets (Taken 8/11/2020 0427)  Sleep/Rest Enhancement:   awakenings minimized   consistent schedule promoted  Sensory Stimulation Regulation: quiet environment promoted  Intervention: Optimize Psychosocial Wellbeing  Flowsheets (Taken 8/11/2020 0427)  Supportive Measures:   active listening utilized   counseling provided   decision-making supported

## 2020-08-11 NOTE — PT/OT/SLP PROGRESS
Physical Therapy      Patient Name:  Martina Betancourt   MRN:  [id number]    Patient not seen today secondary to Other (PT SOILED). On second attempt pt being cleaned.  Will follow-up as able.    Devante Nesbitt, PTA

## 2020-08-11 NOTE — PT/OT/SLP PROGRESS
Occupational Therapy   Treatment    Name: Martina Betancourt  MRN: [id number]  Admitting Diagnosis:  Acute respiratory failure with hypoxia and hypercarbia       Recommendations:     Discharge Recommendations: nursing facility, skilled  Discharge Equipment Recommendations:  none(TBD at SNF)  Barriers to discharge:  (returnt to SNF)    Assessment:     Martina Betancourt is a 69 y.o. female with a medical diagnosis of Acute respiratory failure with hypoxia and hypercarbia.  Performance deficits affecting function are weakness, impaired endurance, impaired self care skills, impaired functional mobilty, impaired balance, decreased upper extremity function, decreased lower extremity function, decreased safety awareness, decreased coordination, pain, impaired skin, edema, impaired cardiopulmonary response to activity, orthopedic precautions.   The patient participated in UE therx in bed. The patient was limited by right shoulder weakness.     Rehab Prognosis:  Good; patient would benefit from acute skilled OT services to address these deficits and reach maximum level of function.       Plan:     Patient to be seen 3 x/week, 5 x/week to address the above listed problems via self-care/home management, therapeutic activities, therapeutic exercises  · Plan of Care Expires: 08/24/20  · Plan of Care Reviewed with: patient    Subjective     Pain/Comfort:  · Pain Rating 1: (yes-did not rate)  · Location - Orientation 1: lower  · Location 1: back  · Pain Addressed 1: Reposition, Cessation of Activity    Objective:      Patient found HOB elevated with bed alarm, oxygen, telemetry upon OT entry to room.    General Precautions: Standard, respiratory, fall   Orthopedic Precautions:RLE non weight bearing   Braces: (cam boot to right foot)     Occupational Performance:     Bed Mobility:    · Patient completed Scooting/Bridging from supine. The patient was able to scoot to the top of the bed with use of B hands on the head bed, bed in  sabino and assist to bend left kneee to pull herself up with CGA. The patient required to be placed in supine in small movements 2* c/o back pain.    Functional Mobility/Transfers:  · Functional Mobility: N/T    Activities of Daily Living:  · Feeding:  modified independence and after tray set up in bed  · Grooming: set up assist to wipe her hands        AMPAC 6 Click ADL: 15    Treatment & Education:  The patient participated in self care and functional mobility tasks as noted above,  · Pt completed  BUE HEP with red theraband in elevated bed:  · Shoulder horizontal ab/dduction x10  · Shoulder flexion/extension with ab/dduction left shoulder only x10  · Elbow extension BUE x10  · Handout placed in blue folder with theraband placed within reach of pt   · The patient was instructed to perform UE therx with OT only    Patient left HOB elevated with all lines intact, call button in reach, bed alarm on and nurseSharon notifiedEducation:      GOALS:   Multidisciplinary Problems     Occupational Therapy Goals        Problem: Occupational Therapy Goal    Goal Priority Disciplines Outcome Interventions   Occupational Therapy Goal     OT, PT/OT Ongoing, Progressing    Description: Goals to be met by: 8/24/20    Patient will increase functional independence with ADLs by performing:    UE Dressing with Modified Union and Supervision.  Grooming while seated with Modified Union.  Sitting at edge of bed x20 minutes with Modified Union.  Rolling to Bilateral with Modified Union.   Supine to sit with Contact Guard Assistance.  Squat pivot transfers: TBA as able  Upper extremity exercise program x15 reps per handout, with independence.                     Time Tracking:     OT Date of Treatment: 08/11/20  OT Start Time: 1653  OT Stop Time: 1708  OT Total Time (min): 15 min    Billable Minutes:Therapeutic Exercise 15    Chanelle Diaz OT  8/11/2020

## 2020-08-11 NOTE — NURSING
[facility] [place]-  RN Proactive Rounding Note    Date of Visit: 08/11/2020  Time of Visit: 0845    Admit Date: 8/10/2020  LOS: 1    Code Status: DNR     Attending Physician: Trevon Nolen MD    TRIGGER:    Reason for Round:  O2 Device/ Pulse Ox and RN concerned    ASSESSMENT:     Abnormal Vital Signs: BiPAP switched to nasal canula   Clinical Issues: Respiratory     INTERVENTIONS/ RECOMMENDATIONS:     Pt is awake alert and oriented Xs 4. Able to follow commands. Sats are 97% on 3 L NC. Pt does not complain of feeling SOB or chest pain. Lungs sounds coarse. Pt resting in bed comfortably.     Discussed plan of care with RN, YES.    PHYSICIAN ESCALATION:     Yes/No  no    Orders received and case discussed with   N/A .    Disposition: Remain in room 322.    FOLLOW-UP/CONTINGENCY:     Call back the Rapid Response Nurse at [phone] for additional questions or concerns.

## 2020-08-11 NOTE — NURSING
Applied Cam boot to (R) extremity. Pt tolerated well. No complaints noted. Will continue to monitor.

## 2020-08-11 NOTE — ASSESSMENT & PLAN NOTE
Her troponin is rapidly rising:   -0.142 -> 0.877 ->1.66  Loaded with Plavix at outside ED   -Continue plavix 75mg po qday  Given ASA 325mg x 1 at outside ED   -Continue ASA 81mg po qday  Lovenox 1mg/Kg given at outside ED at 8:30pm 8/9/20   -Continue with Lovenox 135mg SQ q12 hours at 9am  Telemetry, Cardiology consult  Given age, degree of illness and DNR, recommend against LHC   -Likely better for medical management  t,has elevated Trponin with no chest pain or EKG changed,cardiology is following,Echo show preserved EF,per cardiology small NSTEMI,she denies chest pain,troponin level are decreasing,on ACS mediations,

## 2020-08-11 NOTE — NURSING
Bedside Report given to LORAINE Munson. Walking rounds completed. Visualized and assessed patient NAD noted. Safety precautions maintained and call light within reach.     Chart check completed.

## 2020-08-11 NOTE — ASSESSMENT & PLAN NOTE
,,consulted wound care for below  breast wounds,on chest wall,started on nystatin,on local wound care

## 2020-08-11 NOTE — PROGRESS NOTES
"[facility]  Progress Note    Patient Name: Martina Betancourt  MRN: [id number]  Patient Class: IP- Inpatient   Admission Date: 8/10/2020  Length of Stay: 1 days  Attending Physician: Trevon Nolen MD  Primary Care Provider: Joe Fuller Iii, MD        Subjective:     Principal Problem:Acute respiratory failure with hypoxia and hypercarbia        HPI:  Ms. Betancourt is a 68yo lady with morbid obesity, YOVANI, alzheimers and HTN who is residing in a nursing home currently due to a fractured right ankle.  She cannot remember the day's events, but per EMS reports, "sats were in the 50s when they got the nursing home.  A breathing treatment was given the nursing home and placed on 100% non-rebreather by EMS.  Patient is a DNR and is a patient of Dr. Fuller."    She was taken to [facility] and underwent work up with CTA, which was negative for PTE.  She was found to have an elevated trop and BNP though.  She was begun on NSTEMI therapy and placed on BiPAP.  She was transferred here for further management.  The patient denies any CP to me.  She states she does feel SOB.      Overview/Hospital Course:  patient is admitted  for acute on chronic hypoxic,hypercapnic respiratory failure,with history of YOVANI,non complaint with CPAP at home,has been started in Bipap over night,repeat ABG show improvement,changed bipap to QHS,stable on NC O 2,alert,has elevated Trponin with no chest pain or EKG changed,cardiology is following,Echo show preserved EF,per cardiology small NSTEMI,she denies chest pain,troponin level are decreasing,on ACS mediations,,consulted wound care for below  breast wounds,started on nystatin,   X ray right Ankle for show healing trimalleolar fracture,ortho. is following,boot is placed,  Passed ST,on diet.  PT,OT is recommending SNF,SW is consulted.    Past Medical History:   Diagnosis Date    Alzheimer disease     Alzheimer disease     Coronary artery disease     " Hyperlipidemia     Hypertension     Obesity     Sleep apnea        Past Surgical History:   Procedure Laterality Date    APPENDECTOMY      APPENDECTOMY      CHOLECYSTECTOMY      CORONARY STENT PLACEMENT      HYSTERECTOMY      TONSILLECTOMY         Review of patient's allergies indicates:   Allergen Reactions    Hydroxyzine     Tizanidine        No current facility-administered medications on file prior to encounter.      Current Outpatient Medications on File Prior to Encounter   Medication Sig    acetaminophen (TYLENOL) 325 MG tablet Take 2 tablets (650 mg total) by mouth every 6 (six) hours as needed (HEADACHE, TEMPERATURE - FEVER > 100.4).    albuterol-ipratropium (DUO-NEB) 2.5 mg-0.5 mg/3 mL nebulizer solution Take 3 mLs by nebulization 3 (three) times daily. Rescue    albuterol-ipratropium (DUO-NEB) 2.5 mg-0.5 mg/3 mL nebulizer solution Take 3 mLs by nebulization every 2 (two) hours as needed for Wheezing or Shortness of Breath. Rescue    aspirin (ECOTRIN) 81 MG EC tablet Take 81 mg by mouth once daily.    donepeziL (ARICEPT) 10 MG tablet Take 10 mg by mouth every evening.    DULoxetine (CYMBALTA) 60 MG capsule Take 60 mg by mouth once daily.    ferrous sulfate 324 mg (65 mg iron) TbEC Take 325 mg by mouth every evening.    HYDROcodone-acetaminophen (NORCO)  mg per tablet Take 1 tablet by mouth every 4 (four) hours as needed.    isosorbide mononitrate (IMDUR) 60 MG 24 hr tablet Take 60 mg by mouth once daily.    lisinopriL (PRINIVIL,ZESTRIL) 20 MG tablet Take 1 tablet (20 mg total) by mouth once daily.    memantine (NAMENDA XR) 28 mg CSpX Take 28 mg by mouth once daily.    metoprolol succinate (TOPROL-XL) 50 MG 24 hr tablet Take 50 mg by mouth once daily.    pantoprazole (PROTONIX) 40 MG tablet Take 1 tablet (40 mg total) by mouth before breakfast.    pravastatin (PRAVACHOL) 40 MG tablet Take 40 mg by mouth every evening.     Family History     None        Tobacco Use    Smoking  status: Unknown If Ever Smoked   Substance and Sexual Activity    Alcohol use: Not Currently    Drug use: Never    Sexual activity: Not Currently     Review of Systems   Unable to perform ROS: Dementia   Constitutional: Positive for fatigue. Negative for chills and fever.   HENT: Negative for congestion.    Respiratory: Positive for cough. Negative for shortness of breath.    Cardiovascular: Positive for leg swelling. Negative for chest pain.   Gastrointestinal: Negative for abdominal pain, constipation, diarrhea, nausea and vomiting.   Endocrine: Positive for cold intolerance.   Genitourinary: Negative for dysuria.   Musculoskeletal: Positive for arthralgias and back pain.   Skin: Positive for wound. Negative for color change.   Allergic/Immunologic: Negative for immunocompromised state.   Neurological: Negative for dizziness and weakness.   Hematological: Does not bruise/bleed easily.   Psychiatric/Behavioral: Negative for confusion and decreased concentration. The patient is not nervous/anxious.      Objective:     Vital Signs (Most Recent):  Temp: 97.1 °F (36.2 °C) (08/11/20 0757)  Pulse: 70 (08/11/20 0841)  Resp: 18 (08/11/20 0841)  BP: (!) 109/53 (08/11/20 0757)  SpO2: (!) 93 % (08/11/20 0841) Vital Signs (24h Range):  Temp:  [97.1 °F (36.2 °C)-98.5 °F (36.9 °C)] 97.1 °F (36.2 °C)  Pulse:  [] 70  Resp:  [16-21] 18  SpO2:  [78 %-98 %] 93 %  BP: (102-122)/(50-58) 109/53     Weight: (!) 148.1 kg (326 lb 8 oz)  Body mass index is 46.85 kg/m².    Physical Exam  Vitals signs and nursing note reviewed.   Constitutional:       General: She is not in acute distress.     Appearance: She is well-developed. She is morbidly obese. She is ill-appearing. She is not toxic-appearing or diaphoretic.   HENT:      Head: Normocephalic and atraumatic.      Mouth/Throat:      Pharynx: No oropharyngeal exudate.   Eyes:      General: No scleral icterus.        Right eye: No discharge.         Left eye: No discharge.       Conjunctiva/sclera: Conjunctivae normal.      Pupils: Pupils are equal, round, and reactive to light.   Neck:      Musculoskeletal: Normal range of motion and neck supple.      Thyroid: No thyromegaly.      Vascular: No JVD.      Trachea: No tracheal deviation.   Cardiovascular:      Rate and Rhythm: Normal rate and regular rhythm.      Heart sounds: Normal heart sounds. No murmur. No friction rub. No gallop.    Pulmonary:      Effort: Pulmonary effort is normal. No respiratory distress.      Breath sounds: Decreased air movement present. No stridor. Examination of the right-lower field reveals rales. Examination of the left-lower field reveals rales. Decreased breath sounds and rales present. No wheezing or rhonchi.   Chest:      Chest wall: No tenderness.   Abdominal:      General: Bowel sounds are normal. There is no distension.      Palpations: Abdomen is soft. There is no mass.      Tenderness: There is no abdominal tenderness. There is no guarding or rebound.   Genitourinary:     Comments: No null in place  Musculoskeletal:         General: No tenderness.      Right ankle: She exhibits decreased range of motion.      Comments: Right ankle in brace and wrapped   Lymphadenopathy:      Cervical: No cervical adenopathy.      Comments: Trace edema to lower legs   Skin:     General: Skin is cool and moist.      Coloration: Skin is not pale.      Findings: No erythema or rash.      Comments: Right below breast wounds.   Neurological:      Mental Status: She is oriented to person, place, and time. She is lethargic.      GCS: GCS eye subscore is 4. GCS verbal subscore is 5. GCS motor subscore is 6.      Cranial Nerves: No cranial nerve deficit.      Motor: No abnormal muscle tone.      Coordination: Coordination normal.      Deep Tendon Reflexes: Reflexes normal.   Psychiatric:         Behavior: Behavior normal.         Thought Content: Thought content normal.         Cognition and Memory: Cognition is impaired.  Memory is impaired. She exhibits impaired recent memory and impaired remote memory.         Judgment: Judgment normal.           CRANIAL NERVES     CN III, IV, VI   Pupils are equal, round, and reactive to light.       Significant Labs:   Recent Results (from the past 24 hour(s))   Troponin I    Collection Time: 08/10/20 12:07 PM   Result Value Ref Range    Troponin I 0.625 (H) 0.000 - 0.026 ng/mL   Echo Color Flow Doppler? Yes; Bubble Contrast? No    Collection Time: 08/10/20 12:50 PM   Result Value Ref Range    BSA 2.62 m2    AORTIC VALVE CUSP SEPERATION 2.39 cm    PV PEAK VELOCITY 0.84 cm/s    Ao root annulus 3.69 cm    LA size 3.77 cm    TAPSE 1.86 cm    AV mean gradient 4 mmHg    AV valve area 2.88 cm2    AV Velocity Ratio 0.87     AV index (prosthetic) 0.74     MV valve area p 1/2 method 2.66 cm2    E/A ratio 1.28     E wave decelartion time 239.47 msec    IVRT 94.58 msec    LVOT diameter 2.22 cm    LVOT area 3.9 cm2    LVOT peak anderson 1.24 m/s    LVOT peak VTI 22.43 cm    Ao peak anderson 1.43 m/s    Ao VTI 30.18 cm    LVOT stroke volume 86.78 cm3    AV peak gradient 8 mmHg    MV Peak E Anderson 1.09 m/s    TR Max Anderson 1.58 m/s    MV stenosis pressure 1/2 time 82.59 ms    MV Peak A Anderson 0.85 m/s    RA Major Axis 5.06 cm    Left Atrium Major Axis 4.52 cm    Triscuspid Valve Regurgitation Peak Gradient 10 mmHg    RA Width 4.52 cm    Right Atrial Pressure (from IVC) 3 mmHg    TV rest pulmonary artery pressure 13 mmHg   Troponin I    Collection Time: 08/10/20  6:00 PM   Result Value Ref Range    Troponin I 0.429 (H) 0.000 - 0.026 ng/mL   Basic Metabolic Panel (BMP)    Collection Time: 08/11/20  4:53 AM   Result Value Ref Range    Sodium 136 136 - 145 mmol/L    Potassium 4.1 3.5 - 5.1 mmol/L    Chloride 96 95 - 110 mmol/L    CO2 33 (H) 23 - 29 mmol/L    Glucose 82 70 - 110 mg/dL    BUN, Bld 20 8 - 23 mg/dL    Creatinine 1.2 0.5 - 1.4 mg/dL    Calcium 9.0 8.7 - 10.5 mg/dL    Anion Gap 7 (L) 8 - 16 mmol/L    eGFR if African  American 53 (A) >60 mL/min/1.73 m^2    eGFR if non African American 46 (A) >60 mL/min/1.73 m^2   Magnesium    Collection Time: 08/11/20  4:53 AM   Result Value Ref Range    Magnesium 2.0 1.6 - 2.6 mg/dL   Phosphorus    Collection Time: 08/11/20  4:53 AM   Result Value Ref Range    Phosphorus 3.5 2.7 - 4.5 mg/dL   CBC with Automated Differential    Collection Time: 08/11/20  4:53 AM   Result Value Ref Range    WBC 12.99 (H) 3.90 - 12.70 K/uL    RBC 3.08 (L) 4.00 - 5.40 M/uL    Hemoglobin 8.9 (L) 12.0 - 16.0 g/dL    Hematocrit 29.4 (L) 37.0 - 48.5 %    Mean Corpuscular Volume 96 82 - 98 fL    Mean Corpuscular Hemoglobin 28.9 27.0 - 31.0 pg    Mean Corpuscular Hemoglobin Conc 30.3 (L) 32.0 - 36.0 g/dL    RDW 16.0 (H) 11.5 - 14.5 %    Platelets 322 150 - 350 K/uL    MPV 9.0 (L) 9.2 - 12.9 fL    Immature Granulocytes 0.4 0.0 - 0.5 %    Gran # (ANC) 9.5 (H) 1.8 - 7.7 K/uL    Immature Grans (Abs) 0.05 (H) 0.00 - 0.04 K/uL    Lymph # 1.9 1.0 - 4.8 K/uL    Mono # 1.5 (H) 0.3 - 1.0 K/uL    Eos # 0.0 0.0 - 0.5 K/uL    Baso # 0.03 0.00 - 0.20 K/uL    nRBC 0 0 /100 WBC    Gran% 72.9 38.0 - 73.0 %    Lymph% 14.5 (L) 18.0 - 48.0 %    Mono% 11.7 4.0 - 15.0 %    Eosinophil% 0.3 0.0 - 8.0 %    Basophil% 0.2 0.0 - 1.9 %    Differential Method Automated      Significant Imaging:     CXR: personal interpretation  Bibasilar infiltrates, poor inspiration  Left pleural effusion    CTA chest (verbal report from sending MD):  Large hiatal hernia  Negative for PTE      Assessment/Plan:      * Acute respiratory failure with hypoxia and hypercarbia  Per sending MD (I am unable to see the report):   -CTA was negative for PTE   -Large hiatal hernia was present   -Edema lung bases  Her BNP is elevated, but no echo on file for EF   -Abrupt rise in troponin, so acute cardiac ischemia and failure is other possibility  She has no fever, chills, leukocytosis to suggest PNA   -Given abrupt onset and dementia, will treat with Levaquin in case of  "aspiration  YOVANI and hypoventilation could be playing a part as well   -Currently on BiPAP at 12/5 and 50%    ABG   Duonebs q6 hours scheduled and prn q4 hours   history of YOVANI,non complaint with CPAP at home,has been started in Bipap over night,repeat ABG show improvement,changed bipap to QHS,stable on NC O 2,alert,      Wound, open, chest wall with complication  ,,consulted wound care for below  breast wounds,on chest wall,started on nystatin,on local wound care         Pressure injury of right heel, stage 1  Local wound care .      Severe obesity (BMI >= 40)  Contributing to YOVANI and atelectasis  Encourage weight loss      Obstructive sleep apnea treated with continuous positive airway pressure (CPAP)  She is supposed to be using he CPAP, but states she, "hasn't used it in 3 years"  Improving with nightly bipap.      NSTEMI (non-ST elevated myocardial infarction)  Her troponin is rapidly rising:   -0.142 -> 0.877 ->1.66  Loaded with Plavix at outside ED   -Continue plavix 75mg po qday  Given ASA 325mg x 1 at outside ED   -Continue ASA 81mg po qday  Lovenox 1mg/Kg given at outside ED at 8:30pm 8/9/20   -Continue with Lovenox 135mg SQ q12 hours at 9am  Telemetry, Cardiology consult  Given age, degree of illness and DNR, recommend against [facility]   -Likely better for medical management  t,has elevated Trponin with no chest pain or EKG changed,cardiology is following,Echo show preserved EF,per cardiology small NSTEMI,she denies chest pain,troponin level are decreasing,on ACS mediations,           Do not resuscitate  As noted by sending MD, "Patient is a DNR and is a patient of Dr. Fuller."  She also came with a signed [place] stating DNR  The patient confirmed with the nurse again she wanted to be DNR  She is willing to take fluids, trial of BiPAP and antibiotics though    Closed high lateral malleolus fracture, right, with delayed healing, subsequent encounter  Currently wrapped and in brace  PT and OT consults       X ray " right Ankle for show healing trimalleolar fracture,ortho. is following,boot is placed,    PT,OT is recommending SNF,SW is consulted.    Dementia without behavioral disturbance  Consult SLP for swallow eval,passed on diet.  Continue home Aricept and Namenda  Aspiration precautions        VTE Risk Mitigation (From admission, onward)         Ordered     enoxaparin injection 40 mg  Every 24 hours      08/11/20 1020                      Trevon Nolen MD  Department of Hospital Medicine   [facility]

## 2020-08-12 VITALS
DIASTOLIC BLOOD PRESSURE: 53 MMHG | SYSTOLIC BLOOD PRESSURE: 106 MMHG | OXYGEN SATURATION: 92 % | WEIGHT: 293 LBS | RESPIRATION RATE: 20 BRPM | HEIGHT: 70 IN | TEMPERATURE: 98 F | HEART RATE: 71 BPM | BODY MASS INDEX: 41.95 KG/M2

## 2020-08-12 LAB
ANION GAP SERPL CALC-SCNC: 7 MMOL/L (ref 8–16)
BASOPHILS # BLD AUTO: 0.03 K/UL (ref 0–0.2)
BASOPHILS NFR BLD: 0.3 % (ref 0–1.9)
BUN SERPL-MCNC: 16 MG/DL (ref 8–23)
CALCIUM SERPL-MCNC: 9.1 MG/DL (ref 8.7–10.5)
CHLORIDE SERPL-SCNC: 97 MMOL/L (ref 95–110)
CO2 SERPL-SCNC: 31 MMOL/L (ref 23–29)
CREAT SERPL-MCNC: 1 MG/DL (ref 0.5–1.4)
DIFFERENTIAL METHOD: ABNORMAL
EOSINOPHIL # BLD AUTO: 0.1 K/UL (ref 0–0.5)
EOSINOPHIL NFR BLD: 0.9 % (ref 0–8)
ERYTHROCYTE [DISTWIDTH] IN BLOOD BY AUTOMATED COUNT: 16.1 % (ref 11.5–14.5)
EST. GFR  (AFRICAN AMERICAN): >60 ML/MIN/1.73 M^2
EST. GFR  (NON AFRICAN AMERICAN): 57 ML/MIN/1.73 M^2
GLUCOSE SERPL-MCNC: 89 MG/DL (ref 70–110)
HCT VFR BLD AUTO: 29.5 % (ref 37–48.5)
HGB BLD-MCNC: 8.9 G/DL (ref 12–16)
IMM GRANULOCYTES # BLD AUTO: 0.03 K/UL (ref 0–0.04)
IMM GRANULOCYTES NFR BLD AUTO: 0.3 % (ref 0–0.5)
LYMPHOCYTES # BLD AUTO: 1.7 K/UL (ref 1–4.8)
LYMPHOCYTES NFR BLD: 18.4 % (ref 18–48)
MAGNESIUM SERPL-MCNC: 2.2 MG/DL (ref 1.6–2.6)
MCH RBC QN AUTO: 28.4 PG (ref 27–31)
MCHC RBC AUTO-ENTMCNC: 30.2 G/DL (ref 32–36)
MCV RBC AUTO: 94 FL (ref 82–98)
MONOCYTES # BLD AUTO: 1.4 K/UL (ref 0.3–1)
MONOCYTES NFR BLD: 15.3 % (ref 4–15)
NEUTROPHILS # BLD AUTO: 6 K/UL (ref 1.8–7.7)
NEUTROPHILS NFR BLD: 64.8 % (ref 38–73)
NRBC BLD-RTO: 0 /100 WBC
PHOSPHATE SERPL-MCNC: 2.6 MG/DL (ref 2.7–4.5)
PLATELET # BLD AUTO: 309 K/UL (ref 150–350)
PMV BLD AUTO: 8.7 FL (ref 9.2–12.9)
POTASSIUM SERPL-SCNC: 4 MMOL/L (ref 3.5–5.1)
RBC # BLD AUTO: 3.13 M/UL (ref 4–5.4)
SODIUM SERPL-SCNC: 135 MMOL/L (ref 136–145)
WBC # BLD AUTO: 9.2 K/UL (ref 3.9–12.7)

## 2020-08-12 PROCEDURE — 36415 COLL VENOUS BLD VENIPUNCTURE: CPT

## 2020-08-12 PROCEDURE — 84100 ASSAY OF PHOSPHORUS: CPT

## 2020-08-12 PROCEDURE — 97530 THERAPEUTIC ACTIVITIES: CPT | Mod: CQ

## 2020-08-12 PROCEDURE — 94660 CPAP INITIATION&MGMT: CPT

## 2020-08-12 PROCEDURE — 85025 COMPLETE CBC W/AUTO DIFF WBC: CPT

## 2020-08-12 PROCEDURE — 25000003 PHARM REV CODE 250: Performed by: HOSPITALIST

## 2020-08-12 PROCEDURE — 97110 THERAPEUTIC EXERCISES: CPT | Mod: CQ

## 2020-08-12 PROCEDURE — 25000242 PHARM REV CODE 250 ALT 637 W/ HCPCS: Performed by: HOSPITALIST

## 2020-08-12 PROCEDURE — 94761 N-INVAS EAR/PLS OXIMETRY MLT: CPT

## 2020-08-12 PROCEDURE — 99900035 HC TECH TIME PER 15 MIN (STAT)

## 2020-08-12 PROCEDURE — 94640 AIRWAY INHALATION TREATMENT: CPT

## 2020-08-12 PROCEDURE — 80048 BASIC METABOLIC PNL TOTAL CA: CPT

## 2020-08-12 PROCEDURE — 25000003 PHARM REV CODE 250: Performed by: INTERNAL MEDICINE

## 2020-08-12 PROCEDURE — 63600175 PHARM REV CODE 636 W HCPCS: Performed by: INTERNAL MEDICINE

## 2020-08-12 PROCEDURE — 83735 ASSAY OF MAGNESIUM: CPT

## 2020-08-12 RX ORDER — FUROSEMIDE 40 MG/1
40 TABLET ORAL DAILY
Qty: 30 TABLET | Refills: 11
Start: 2020-08-12 | End: 2021-08-12

## 2020-08-12 RX ORDER — IPRATROPIUM BROMIDE AND ALBUTEROL SULFATE 2.5; .5 MG/3ML; MG/3ML
3 SOLUTION RESPIRATORY (INHALATION) EVERY 6 HOURS PRN
Qty: 1 BOX | Refills: 0
Start: 2020-08-12 | End: 2022-04-14 | Stop reason: SDUPTHER

## 2020-08-12 RX ORDER — AMOXICILLIN AND CLAVULANATE POTASSIUM 875; 125 MG/1; MG/1
1 TABLET, FILM COATED ORAL 2 TIMES DAILY
Qty: 14 TABLET | Refills: 0
Start: 2020-08-12 | End: 2020-08-19

## 2020-08-12 RX ORDER — HYDROCODONE BITARTRATE AND ACETAMINOPHEN 5; 325 MG/1; MG/1
1 TABLET ORAL EVERY 8 HOURS PRN
Qty: 15 TABLET | Refills: 0 | Status: SHIPPED | OUTPATIENT
Start: 2020-08-12 | End: 2020-08-17

## 2020-08-12 RX ORDER — LISINOPRIL 2.5 MG/1
2.5 TABLET ORAL DAILY
Qty: 90 TABLET | Refills: 3 | Status: ON HOLD
Start: 2020-08-12 | End: 2020-12-01 | Stop reason: HOSPADM

## 2020-08-12 RX ORDER — CLOPIDOGREL BISULFATE 75 MG/1
75 TABLET ORAL DAILY
Qty: 30 TABLET | Refills: 11 | Status: ON HOLD
Start: 2020-08-12 | End: 2022-04-12 | Stop reason: HOSPADM

## 2020-08-12 RX ADMIN — PANTOPRAZOLE SODIUM 40 MG: 40 TABLET, DELAYED RELEASE ORAL at 05:08

## 2020-08-12 RX ADMIN — CLOPIDOGREL 75 MG: 75 TABLET, FILM COATED ORAL at 09:08

## 2020-08-12 RX ADMIN — IPRATROPIUM BROMIDE AND ALBUTEROL SULFATE 3 ML: .5; 3 SOLUTION RESPIRATORY (INHALATION) at 08:08

## 2020-08-12 RX ADMIN — DOCUSATE SODIUM 50 MG AND SENNOSIDES 8.6 MG 1 TABLET: 8.6; 5 TABLET, FILM COATED ORAL at 10:08

## 2020-08-12 RX ADMIN — METOPROLOL SUCCINATE 50 MG: 50 TABLET, FILM COATED, EXTENDED RELEASE ORAL at 09:08

## 2020-08-12 RX ADMIN — HYDROCODONE BITARTRATE AND ACETAMINOPHEN 1 TABLET: 5; 325 TABLET ORAL at 12:08

## 2020-08-12 RX ADMIN — LISINOPRIL 2.5 MG: 2.5 TABLET ORAL at 09:08

## 2020-08-12 RX ADMIN — MEMANTINE HYDROCHLORIDE 10 MG: 5 TABLET ORAL at 09:08

## 2020-08-12 RX ADMIN — LEVOFLOXACIN 750 MG: 750 INJECTION, SOLUTION INTRAVENOUS at 05:08

## 2020-08-12 RX ADMIN — HYDROCODONE BITARTRATE AND ACETAMINOPHEN 1 TABLET: 5; 325 TABLET ORAL at 05:08

## 2020-08-12 RX ADMIN — ASPIRIN 81 MG 81 MG: 81 TABLET ORAL at 09:08

## 2020-08-12 RX ADMIN — FUROSEMIDE 40 MG: 40 TABLET ORAL at 09:08

## 2020-08-12 RX ADMIN — DULOXETINE HYDROCHLORIDE 60 MG: 30 CAPSULE, DELAYED RELEASE ORAL at 09:08

## 2020-08-12 RX ADMIN — ISOSORBIDE MONONITRATE 60 MG: 30 TABLET, EXTENDED RELEASE ORAL at 09:08

## 2020-08-12 NOTE — PROGRESS NOTES
This note was entered in error by Dr. Surendra Sue and was not completed by him.  He is no longer working at our facility.

## 2020-08-12 NOTE — PLAN OF CARE
08/12/20 1050   Post-Acute Status   Post-Acute Authorization Placement   Post-Acute Placement Status Pending Payor Review   Patient choice form signed by patient/caregiver List with quality metrics by geographic area provided   Discharge Delays None known at this time   Discharge Plan   Discharge Plan A Skilled Nursing Facility     Orders received to transport pt back to [facility]. SW faxed orders to [phone]. SW waiting confirmation services will be provided.     11:02am  JOHN faxed updated orders to [facility] at [phone].     11:15am   received a call from Cynthia with [facility] providing SW with call report information. According to Cynthia, pt will transport back to room 118, nurse will be Cece, and call report to [phone]. According to Cynthia, they will approve for pt to transport by w/c or stretcher pending pt's condition via [facility].     11:24am  JOHN contacted pt's daughter, Alexsandra, and notified pt to transport back to SNF and transport will be arranged for 1:00pm. Alexsandra verbalized understanding.     11:38am  JOHN spoke with LORAINE Coleman, to discuss pt's travel. We discussed w/c vs stretcher. According to pt's nursePreet, pt is unable to travel by w/c. Stretcher arranged due to alzheimer's diease, postural instability, ankle fracture, and confusion.     11:39am  ADT 30 order placed for Stretcher Transportation.  Requested  time: 1:00pm.   If transportation does not arrive at ETA time nurse will be instructed to follow protocol for transportation below:   How can I get in touch directly with dispatch, if needed?                 · Non-emergent (stretcher): [phone]    · Emergent dispatch: [phone]    ++NURSING:  If Stretcher does not arrive at requested time please call the above Non Emergent Dispatcher.  If issue not resolved please escalate to your charge nurse for further instructions.

## 2020-08-12 NOTE — PLAN OF CARE
Problem: Fluid Imbalance (Pneumonia)  Goal: Fluid Balance  Outcome: Ongoing, Progressing  Intervention: Monitor and Manage Fluid Balance  Flowsheets (Taken 8/12/2020 0632)  Fluid/Electrolyte Management: fluids provided     Problem: Respiratory Compromise (Pneumonia)  Goal: Effective Oxygenation and Ventilation  Outcome: Ongoing, Progressing  Intervention: Optimize Oxygenation and Ventilation  Flowsheets (Taken 8/12/2020 0632)  Airway/Ventilation Management:   airway patency maintained   pulmonary hygiene promoted  Head of Bed (HOB): HOB at 30 degrees     Problem: Adult Inpatient Plan of Care  Goal: Plan of Care Review  Outcome: Ongoing, Progressing  Flowsheets (Taken 8/12/2020 0632)  Plan of Care Reviewed With: patient  Goal: Patient-Specific Goal (Individualization)  Outcome: Ongoing, Progressing  Goal: Absence of Hospital-Acquired Illness or Injury  Outcome: Ongoing, Progressing  Goal: Optimal Comfort and Wellbeing  Outcome: Ongoing, Progressing     Problem: Bariatric Environmental Safety  Goal: Safety Maintained with Care  Outcome: Ongoing, Progressing     Problem: Wound  Goal: Optimal Wound Healing  Outcome: Ongoing, Progressing     Problem: Infection  Goal: Infection Symptom Resolution  Outcome: Ongoing, Progressing     Problem: Fall Injury Risk  Goal: Absence of Fall and Fall-Related Injury  Outcome: Ongoing, Progressing     Problem: Skin Injury Risk Increased  Goal: Skin Health and Integrity  Outcome: Ongoing, Progressing     Problem: Pain Acute  Goal: Optimal Pain Control  Outcome: Ongoing, Progressing

## 2020-08-12 NOTE — DISCHARGE SUMMARY
"[facility]  Discharge Summary      Patient Name: Martina Betancourt  MRN: [id number]  Admission Date: 8/10/2020  Hospital Length of Stay: 2 days  Discharge Date and Time:  08/12/2020 11:07 AM  Attending Physician: Trevon Nolen MD   Discharging Provider: Trevon Nolen MD  Primary Care Provider: Joe Fuller Iii, MD      HPI:   Ms. Betancourt is a 68yo lady with morbid obesity, YOVANI, alzheimers and HTN who is residing in a nursing home currently due to a fractured right ankle.  She cannot remember the day's events, but per EMS reports, "sats were in the 50s when they got the nursing home.  A breathing treatment was given the nursing home and placed on 100% non-rebreather by EMS.  Patient is a DNR and is a patient of Dr. Fuller."    She was taken to [facility] and underwent work up with CTA, which was negative for PTE.  She was found to have an elevated trop and BNP though.  She was begun on NSTEMI therapy and placed on BiPAP.  She was transferred here for further management.  The patient denies any CP to me.  She states she does feel SOB.      * No surgery found *      Hospital Course:   patient was admitted  for acute on chronic hypoxic,hypercapnic respiratory failure,with history of YOVANI,non complaint with CPAP at home,was started in Bipap over night,repeat ABG show improvement,changed bipap to QHS,was stable on NC O 2 during day,,alert,had  elevated Trponin with no chest pain or EKG changed,cardiology was following,Echo show preserved EF,per cardiology small NSTEMI,she denies chest pain,troponin level are improved,was on ACS mediations,.  consulted wound care for below  breast wounds,started on miconazole,also foot wound care.   X ray right Ankle, show healing trimalleolar fracture,ortho. Was following,boot was placed,no plan for intervention.  Passed ST,was on diet.empiric IV Abx,  Her symptoms much improved,was alert and oriented,  PT,OT is recommending " SNF,SW is consulted.  Patient was discharged to SNF with nightly bipap,oxygen during day,and follow up with PCP and orthopedic as out patient.     Consults:   Consults (From admission, onward)        Status Ordering Provider     Inpatient consult to Cardiology  Once     Provider:  (Not yet assigned)    Acknowledged LUIS PARTIDA     Inpatient consult to Orthopedic Surgery  Once     Provider:  Jamarcus Coello MD    Completed JORGE A GEE     Inpatient consult to Social Work  Once     Provider:  (Not yet assigned)    Acknowledged JORGE A GEE          No new Assessment & Plan notes have been filed under this hospital service since the last note was generated.  Service: Hospital Medicine    Final Active Diagnoses:    Diagnosis Date Noted POA    PRINCIPAL PROBLEM:  Acute respiratory failure with hypoxia and hypercarbia [J96.01, J96.02] 08/10/2020 Yes    Wound, open, chest wall with complication [S21.109A] 08/11/2020 Yes    NSTEMI (non-ST elevated myocardial infarction) [I21.4] 08/10/2020 Yes    Obstructive sleep apnea treated with continuous positive airway pressure (CPAP) [G47.33, Z99.89] 08/10/2020 Not Applicable    Severe obesity (BMI >= 40) [E66.01] 08/10/2020 Yes    Pressure injury of right heel, stage 1 [L89.611] 08/10/2020 Yes    Dementia without behavioral disturbance [F03.90] 07/26/2020 Yes    Do not resuscitate [Z66] 07/26/2020 Yes    Closed high lateral malleolus fracture, right, with delayed healing, subsequent encounter [S82.61XG] 07/26/2020 Not Applicable      Problems Resolved During this Admission:       Discharged Condition: stable    Disposition: Skilled Nursing Facility    Follow Up:  Follow-up Information     Joe Fuller Iii, MD In 1 week.    Specialty: Internal Medicine  Contact information:  [address] [place] [address] [id number] [phone]                 Patient Instructions:      Activity as tolerated       Significant Diagnostic Studies: Labs:   BMP:    Recent Labs   Lab 08/11/20  0453 08/12/20  0506   GLU 82 89    135*   K 4.1 4.0   CL 96 97   CO2 33* 31*   BUN 20 16   CREATININE 1.2 1.0   CALCIUM 9.0 9.1   MG 2.0 2.2   , CMP   Recent Labs   Lab 08/11/20  0453 08/12/20  0506    135*   K 4.1 4.0   CL 96 97   CO2 33* 31*   GLU 82 89   BUN 20 16   CREATININE 1.2 1.0   CALCIUM 9.0 9.1   ANIONGAP 7* 7*   ESTGFRAFRICA 53* >60   EGFRNONAA 46* 57*   , CBC   Recent Labs   Lab 08/11/20  0453 08/12/20  0506   WBC 12.99* 9.20   HGB 8.9* 8.9*   HCT 29.4* 29.5*    309   , Lipid Panel   Lab Results   Component Value Date    CHOL 63 (L) 08/10/2020    HDL 19 (L) 08/10/2020    LDLCALC 28.6 (L) 08/10/2020    TRIG 77 08/10/2020    CHOLHDL 30.2 08/10/2020    and Troponin   Recent Labs   Lab 08/10/20  1800   TROPONINI 0.429*     Radiology: X-Ray: CXR: X-Ray Chest 1 View (CXR): No results found for this visit on 08/10/20., X-Ray Chest PA and Lateral (CXR): No results found for this visit on 08/10/20. and foot     Pending Diagnostic Studies:     None         Medications:  Reconciled Home Medications:      Medication List      START taking these medications    amoxicillin-clavulanate 875-125mg 875-125 mg per tablet  Commonly known as: AUGMENTIN  Take 1 tablet by mouth 2 (two) times daily. for 7 days     clopidogreL 75 mg tablet  Commonly known as: PLAVIX  Take 1 tablet (75 mg total) by mouth once daily.     furosemide 40 MG tablet  Commonly known as: LASIX  Take 1 tablet (40 mg total) by mouth once daily.     HYDROcodone-acetaminophen 5-325 mg per tablet  Commonly known as: NORCO  Take 1 tablet by mouth every 8 (eight) hours as needed.  Replaces: HYDROcodone-acetaminophen  mg per tablet     miconazole nitrate 2% 2 % Oint  Commonly known as: MICOTIN  Apply topically 2 (two) times daily.        CHANGE how you take these medications    albuterol-ipratropium 2.5 mg-0.5 mg/3 mL nebulizer solution  Commonly known as: DUO-NEB  Take 3 mLs by nebulization every 6 (six)  hours as needed for Wheezing or Shortness of Breath. Rescue  What changed:   · when to take this  · reasons to take this  · Another medication with the same name was removed. Continue taking this medication, and follow the directions you see here.     lisinopriL 2.5 MG tablet  Commonly known as: PRINIVIL,ZESTRIL  Take 1 tablet (2.5 mg total) by mouth once daily.  What changed:   · medication strength  · how much to take        CONTINUE taking these medications    acetaminophen 325 MG tablet  Commonly known as: TYLENOL  Take 2 tablets (650 mg total) by mouth every 6 (six) hours as needed (HEADACHE, TEMPERATURE - FEVER > 100.4).     aspirin 81 MG EC tablet  Commonly known as: ECOTRIN  Take 81 mg by mouth once daily.     donepeziL 10 MG tablet  Commonly known as: ARICEPT  Take 10 mg by mouth every evening.     DULoxetine 60 MG capsule  Commonly known as: CYMBALTA  Take 60 mg by mouth once daily.     ferrous sulfate 324 mg (65 mg iron) Tbec  Take 325 mg by mouth every evening.     isosorbide mononitrate 60 MG 24 hr tablet  Commonly known as: IMDUR  Take 60 mg by mouth once daily.     memantine 28 mg Cspx  Commonly known as: NAMENDA XR  Take 28 mg by mouth once daily.     metoprolol succinate 50 MG 24 hr tablet  Commonly known as: TOPROL-XL  Take 50 mg by mouth once daily.     pantoprazole 40 MG tablet  Commonly known as: PROTONIX  Take 1 tablet (40 mg total) by mouth before breakfast.     pravastatin 40 MG tablet  Commonly known as: PRAVACHOL  Take 40 mg by mouth every evening.        STOP taking these medications    HYDROcodone-acetaminophen  mg per tablet  Commonly known as: NORCO  Replaced by: HYDROcodone-acetaminophen 5-325 mg per tablet            Indwelling Lines/Drains at time of discharge:   Lines/Drains/Airways     Drain            Female External Urinary Catheter 08/11/20 2000 less than 1 day                Time spent on the discharge of patient: over 30  minutes  Patient was seen and examined on the  date of discharge and determined to be suitable for discharge.         Trevon Nolen MD  Department of Hospital Medicine  [facility]

## 2020-08-12 NOTE — PT/OT/SLP DISCHARGE
Occupational Therapy Discharge Summary    Martina Betancourt  MRN: [id number]   Principal Problem: Acute respiratory failure with hypoxia and hypercarbia      Patient Discharged from acute Occupational Therapy on 8/12/20.  Please refer to prior OT notes for functional status.    Assessment:      Patient appropriate for care in another setting.    Objective:     GOALS:   Multidisciplinary Problems     Occupational Therapy Goals        Problem: Occupational Therapy Goal    Goal Priority Disciplines Outcome Interventions   Occupational Therapy Goal     OT, PT/OT Ongoing, Progressing    Description: Goals to be met by: 8/24/20    Patient will increase functional independence with ADLs by performing:    UE Dressing with Modified [place] and Supervision.  Grooming while seated with Modified [place].  Sitting at edge of bed x20 minutes with Modified [place].  Rolling to Bilateral with Modified [place].   Supine to sit with Contact Guard Assistance.  Squat pivot transfers: TBA as able  Upper extremity exercise program x15 reps per handout, with independence.                     Reasons for Discontinuation of Therapy Services  Transfer to alternate level of care.      Plan:     Patient Discharged to: Skilled Nursing Facility    Chanelle Diaz OT  8/12/2020

## 2020-08-12 NOTE — NURSING
1912 Received report from LORAINE Jaramillo. Patient alert and oriented. Complaints of back pain - will give PRN meds. No sign of distress. IV line intact - saline locked. On 3L of oxygen. Cardiac monitor in place. Call bell given on her reach. Instructed to call for assistance as needed. Started on Purewick to suction. Safety maintained.

## 2020-08-12 NOTE — PT/OT/SLP PROGRESS
"Occupational Therapy      Patient Name:  Martina Betancourt   MRN:  [id number]    Patient not seen today secondary to Other (Pt just finished worked with PT and was assisted back to supine in preparation for discharge, pt stated " I think I have worked out enough today". Pt discharging back to SNF shortly). Will follow-up as able    EMILY Justin  8/12/2020  "

## 2020-08-12 NOTE — PLAN OF CARE
08/12/20 1142   Final Note   Assessment Type Final Discharge Note   Anticipated Discharge Disposition SNF   What phone number can be called within the next 1-3 days to see how you are doing after discharge? [phone]   Right Care Referral Info   Post Acute Recommendation SNF / Sub-Acute Rehab   Referral Type SNF   Facility Name [facility] [address] [place] [address]   Post-Acute Status   Post-Acute Authorization Placement   Post-Acute Placement Status Set-up Complete   Discharge Delays None known at this time

## 2020-08-12 NOTE — NURSING
Bedside Report given to LORAINE gee. Walking rounds completed. Visualized and assessed patient NAD noted. Safety precautions maintained and call light within reach.     Chart check completed.

## 2020-08-12 NOTE — PT/OT/SLP DISCHARGE
Physical Therapy Discharge Summary    Name: Martina Betancourt  MRN: [id number]   Principal Problem: Acute respiratory failure with hypoxia and hypercarbia     Patient Discharged from acute Physical Therapy on .  Please refer to prior PT noted date on  for functional status.     Assessment:     Goals partially met.    Objective:     GOALS:   Multidisciplinary Problems     Physical Therapy Goals        Problem: Physical Therapy Goal    Goal Priority Disciplines Outcome Goal Variances Interventions   Physical Therapy Goal     PT, PT/OT Ongoing, Progressing     Description: Goals to be met by:      Patient will increase functional independence with mobility by performin. Supine to sit with Stand-by Assistance  2. Sit to supine with Stand-by Assistance  3. Rolling to Left and Right with Stand-by Assistance.  4. Sitting at edge of bed x20 minutes with Modified [place]  5. Lower extremity exercise program x20 reps per handout, with supervision    Standing TBA once WB status clarified after Ortho consult                     Reasons for Discontinuation of Therapy Services  Transfer to alternate level of care.      Plan:     Patient Discharged to: Skilled Nursing Facility.    Kym Messer, PT  2020

## 2020-08-12 NOTE — PLAN OF CARE
[facility]     Department of Hospital Medicine     [address] [place] [address]     [phone] after hours  [phone] fax       NURSING HOME ORDERS    08/12/2020    Admit to Nursing Home:     Skilled Bed                                               Diagnoses:  Active Hospital Problems    Diagnosis  POA    *Acute respiratory failure with hypoxia and hypercarbia [J96.01, J96.02]  Yes    Wound, open, chest wall with complication [S21.109A]  Yes    NSTEMI (non-ST elevated myocardial infarction) [I21.4]  Yes    Obstructive sleep apnea treated with continuous positive airway pressure (CPAP) [G47.33, Z99.89]  Not Applicable    Severe obesity (BMI >= 40) [E66.01]  Yes    Pressure injury of right heel, stage 1 [L89.611]  Yes    Dementia without behavioral disturbance [F03.90]  Yes    Do not resuscitate [Z66]  Yes    Closed high lateral malleolus fracture, right, with delayed healing, subsequent encounter [S82.61XG]  Not Applicable      Resolved Hospital Problems   No resolved problems to display.       Patient is homebound due to:  Acute respiratory failure with hypoxia and hypercarbia    Allergies:  Review of patient's allergies indicates:   Allergen Reactions    Hydroxyzine     Tizanidine        Vitals:           Every shift (Skilled Nursing patients)    Diet: mechanical soft diet,aspiration precaution     Acitivities:      - Up in a chair each morning as tolerated   - Ambulate with assistance to bathroom       - May use walker, cane, or self-propelled wheelchair   - Weight bearing: non weight bearing on right leg     LABS:  Per facility protocol    Nursing Precautions:    - Aspiration precautions:                 -  Upright 90 degrees befor during and after meals               - Fall precautions per nursing home protocol     - Decubitus precautions:        -  for positioning   - Pressure reducing foam mattress   - Turn patient every two hours.  Use wedge pillows to anchor patient    CONSULTS:      Physical Therapy to evaluate and treat     Occupational Therapy to evaluate and treat         MISCELLANEOUS CARE:              Wound care,    Continue monitoring pressure injury on left heel and offload pressure from heel.  Use EHOB boot on right lower extremity to offload pressure from heel.  Place daily meplex on left foot.    Continue CriticAid AF under breast to treat fungus/manage moisture.     Routine Skin for Bedridden Patients:  Apply moisture barrier cream to all    skin folds and wet areas in perineal area daily and after baths and                           all bowel movements.      Respiratory,please keep on 2 liter Oxygen during day and Bipap 12/5 FIO2 at 30% QHS                     Medications: Discontinue all previous medication orders, if any. See new list below.     Martina Betancourt   Home Medication Instructions SUE:[id number]    Printed on:08/12/20 6221   Medication Information                      acetaminophen (TYLENOL) 325 MG tablet  Take 2 tablets (650 mg total) by mouth every 6 (six) hours as needed (HEADACHE, TEMPERATURE - FEVER > 100.4).             albuterol-ipratropium (DUO-NEB) 2.5 mg-0.5 mg/3 mL nebulizer solution  Take 3 mLs by nebulization every 6 (six) hours as needed for Wheezing or Shortness of Breath. Rescue             amoxicillin-clavulanate 875-125mg (AUGMENTIN) 875-125 mg per tablet  Take 1 tablet by mouth 2 (two) times daily. for 7 days  Until 8.19.20            aspirin (ECOTRIN) 81 MG EC tablet  Take 81 mg by mouth once daily.             clopidogreL (PLAVIX) 75 mg tablet  Take 1 tablet (75 mg total) by mouth once daily.             donepeziL (ARICEPT) 10 MG tablet  Take 10 mg by mouth every evening.             DULoxetine (CYMBALTA) 60 MG capsule  Take 60 mg by mouth once daily.             ferrous sulfate 324 mg (65 mg iron) TbEC  Take 325 mg by mouth every evening.             furosemide (LASIX) 40 MG tablet  Take 1  tablet (40 mg total) by mouth once daily.             HYDROcodone-acetaminophen (NORCO) 5-325 mg per tablet  Take 1 tablet by mouth every 8 (eight) hours as needed for pain.             isosorbide mononitrate (IMDUR) 60 MG 24 hr tablet  Take 60 mg by mouth once daily.             lisinopriL (PRINIVIL,ZESTRIL) 2.5 MG tablet  Take 1 tablet (2.5 mg total) by mouth once daily.             memantine (NAMENDA XR) 28 mg CSpX  Take 28 mg by mouth once daily.             metoprolol succinate (TOPROL-XL) 50 MG 24 hr tablet  Take 50 mg by mouth once daily.             miconazole nitrate 2% (MICOTIN) 2 % Oint  Apply topically 2 (two) times daily,apply below breast              pantoprazole (PROTONIX) 40 MG tablet  Take 1 tablet (40 mg total) by mouth before breakfast.             pravastatin (PRAVACHOL) 40 MG tablet  Take 40 mg by mouth every evening.                       _________________________________  Trevon Nolen MD  08/12/2020

## 2020-08-12 NOTE — PT/OT/SLP PROGRESS
"Physical Therapy Treatment    Patient Name:  Martina Betancourt   MRN:  [id number]    Recommendations:     Discharge Recommendations:  nursing facility, skilled   Discharge Equipment Recommendations: hospital bed, lift device, wheelchair   Barriers to discharge: Decreased caregiver support    Assessment:     Martina Betancourt is a 69 y.o. female admitted with a medical diagnosis of Acute respiratory failure with hypoxia and hypercarbia.  She presents with the following impairments/functional limitations:  weakness, impaired endurance, impaired self care skills, impaired functional mobilty, gait instability, impaired balance, decreased coordination, decreased upper extremity function, decreased lower extremity function, decreased safety awareness, pain, edema, impaired skin, impaired cardiopulmonary response to activity, impaired joint extensibility.   Pt  Sup to sit with MIN a with RLE. Sat EOB with good balance x 30 min for LE therex and to eat lunch. Sit to sup with MIN A with BLE lift, max x 3 to scoot to HOB. Pt tolerated BLE TE supine and sitting 2x10 with vc.    Rehab Prognosis: Good; patient would benefit from acute skilled PT services to address these deficits and reach maximum level of function.    Recent Surgery: * No surgery found *      Plan:     During this hospitalization, patient to be seen 6 x/week to address the identified rehab impairments via therapeutic activities, therapeutic exercises, neuromuscular re-education and progress toward the following goals:    · Plan of Care Expires:  08/24/20    Subjective     Chief Complaint: pain  Patient/Family Comments/goals: "I have sciatica pain in my back and down the back of both my legs."  Pain/Comfort:  · Pain Rating 1: 8/10  · Location - Side 1: Bilateral  · Location - Orientation 1: generalized  · Location 1: back(sciatic pain; BLE)  · Pain Addressed 1: Pre-medicate for activity, Reposition, Distraction, Cessation of Activity, Nurse notified  · Pain Rating " Post-Intervention 1: 8/10      Objective:     Communicated with nurse prior to session.  Patient found HOB elevated with oxygen, peripheral IV upon PT entry to room.     General Precautions: Standard, fall   Orthopedic Precautions:RLE non weight bearing   Braces: (CAMBOOT)     Functional Mobility:  · Bed Mobility:     · Rolling Right: stand by assistance  · Scooting: stand by assistance to EOB. To HOB tot A x 3 using drawsheet  · Supine to Sit: minimum assistance  · Sit to Supine: minimum assistance /c BLE lift  · Balance: good sitting balance      AM-PAC 6 CLICK MOBILITY  Turning over in bed (including adjusting bedclothes, sheets and blankets)?: 2  Sitting down on and standing up from a chair with arms (e.g., wheelchair, bedside commode, etc.): 2  Moving from lying on back to sitting on the side of the bed?: 3  Moving to and from a bed to a chair (including a wheelchair)?: 2  Need to walk in hospital room?: 1  Climbing 3-5 steps with a railing?: 1  Basic Mobility Total Score: 11       Therapeutic Activities and Exercises:   supine BLE TE 2X10; hs, abd/add, hip rotation, ap  LAQ, AND PILLOW SQUEEZES 2X10  Sat EOB x 30 mins for therex and eating lunch    Patient left HOB elevated with all lines intact, call button in reach and nurse present..    GOALS:   Multidisciplinary Problems     Physical Therapy Goals        Problem: Physical Therapy Goal    Goal Priority Disciplines Outcome Goal Variances Interventions   Physical Therapy Goal     PT, PT/OT Ongoing, Progressing     Description: Goals to be met by:      Patient will increase functional independence with mobility by performin. Supine to sit with Stand-by Assistance  2. Sit to supine with Stand-by Assistance  3. Rolling to Left and Right with Stand-by Assistance.  4. Sitting at edge of bed x20 minutes with Modified [place]  5. Lower extremity exercise program x20 reps per handout, with supervision    Standing TBA once WB status clarified after  Ortho consult                     Time Tracking:     PT Received On: 08/12/20  PT Start Time: 1155     PT Stop Time: 1233  PT Total Time (min): 38 min     Billable Minutes: Therapeutic Activity 15 and Therapeutic Exercise 23       PT/PTA: PTA     PTA Visit Number: 1     Devante Nesbitt PTA  08/12/2020

## 2020-08-14 LAB
BACTERIA BLD CULT: NORMAL
BACTERIA BLD CULT: NORMAL

## 2020-08-25 ENCOUNTER — LAB VISIT (OUTPATIENT)
Dept: LAB | Facility: HOSPITAL | Age: 70
End: 2020-08-25
Attending: INTERNAL MEDICINE
Payer: OTHER GOVERNMENT

## 2020-08-25 DIAGNOSIS — E78.5 HYPERLIPEMIA: Primary | ICD-10-CM

## 2020-08-25 LAB
ALBUMIN SERPL BCP-MCNC: 2.3 G/DL (ref 3.5–5.2)
ALP SERPL-CCNC: 114 U/L (ref 55–135)
ALT SERPL W/O P-5'-P-CCNC: 14 U/L (ref 10–44)
AST SERPL-CCNC: 14 U/L (ref 10–40)
BILIRUB DIRECT SERPL-MCNC: 0.2 MG/DL (ref 0.1–0.3)
BILIRUB SERPL-MCNC: 0.5 MG/DL (ref 0.1–1)
CHOLEST SERPL-MCNC: 93 MG/DL (ref 120–199)
CHOLEST/HDLC SERPL: 3.4 {RATIO} (ref 2–5)
HDLC SERPL-MCNC: 27 MG/DL (ref 40–75)
HDLC SERPL: 29 % (ref 20–50)
LDLC SERPL CALC-MCNC: 43.8 MG/DL (ref 63–159)
NONHDLC SERPL-MCNC: 66 MG/DL
PROT SERPL-MCNC: 7.2 G/DL (ref 6–8.4)
TRIGL SERPL-MCNC: 111 MG/DL (ref 30–150)

## 2020-08-25 PROCEDURE — 80076 HEPATIC FUNCTION PANEL: CPT

## 2020-08-25 PROCEDURE — 36415 COLL VENOUS BLD VENIPUNCTURE: CPT

## 2020-08-25 PROCEDURE — 80061 LIPID PANEL: CPT

## 2020-09-16 ENCOUNTER — APPOINTMENT (OUTPATIENT)
Dept: LAB | Facility: HOSPITAL | Age: 70
End: 2020-09-16
Attending: INTERNAL MEDICINE
Payer: MEDICARE

## 2020-09-16 DIAGNOSIS — N39.0 URINARY TRACT INFECTION, SITE NOT SPECIFIED: Primary | ICD-10-CM

## 2020-09-16 LAB
BILIRUB UR QL STRIP: NEGATIVE
CLARITY UR: CLEAR
COLOR UR: YELLOW
GLUCOSE UR QL STRIP: NEGATIVE
HGB UR QL STRIP: NEGATIVE
KETONES UR QL STRIP: NEGATIVE
LEUKOCYTE ESTERASE UR QL STRIP: NEGATIVE
NITRITE UR QL STRIP: NEGATIVE
PH UR STRIP: 6 [PH] (ref 5–8)
PROT UR QL STRIP: NEGATIVE
SP GR UR STRIP: 1.01 (ref 1–1.03)
URN SPEC COLLECT METH UR: NORMAL
UROBILINOGEN UR STRIP-ACNC: NEGATIVE EU/DL

## 2020-09-16 PROCEDURE — 87086 URINE CULTURE/COLONY COUNT: CPT

## 2020-09-16 PROCEDURE — 81003 URINALYSIS AUTO W/O SCOPE: CPT

## 2020-09-17 ENCOUNTER — HOSPITAL ENCOUNTER (EMERGENCY)
Facility: HOSPITAL | Age: 70
Discharge: HOME | End: 2020-09-17
Attending: EMERGENCY MEDICINE
Payer: MEDICARE

## 2020-09-17 VITALS
SYSTOLIC BLOOD PRESSURE: 139 MMHG | DIASTOLIC BLOOD PRESSURE: 80 MMHG | HEIGHT: 70 IN | WEIGHT: 293 LBS | TEMPERATURE: 98 F | BODY MASS INDEX: 41.95 KG/M2 | HEART RATE: 77 BPM | OXYGEN SATURATION: 99 % | RESPIRATION RATE: 22 BRPM

## 2020-09-17 DIAGNOSIS — L89.612 PRESSURE INJURY OF RIGHT HEEL, STAGE 2: ICD-10-CM

## 2020-09-17 DIAGNOSIS — M54.31 BILATERAL SCIATICA: Primary | ICD-10-CM

## 2020-09-17 DIAGNOSIS — M54.32 BILATERAL SCIATICA: Primary | ICD-10-CM

## 2020-09-17 LAB — BACTERIA UR CULT: NORMAL

## 2020-09-17 PROCEDURE — 99284 EMERGENCY DEPT VISIT MOD MDM: CPT | Mod: 25

## 2020-09-17 PROCEDURE — 63600175 PHARM REV CODE 636 W HCPCS: Performed by: CLINICAL NURSE SPECIALIST

## 2020-09-17 PROCEDURE — 96372 THER/PROPH/DIAG INJ SC/IM: CPT

## 2020-09-17 RX ORDER — KETOROLAC TROMETHAMINE 30 MG/ML
30 INJECTION, SOLUTION INTRAMUSCULAR; INTRAVENOUS
Status: COMPLETED | OUTPATIENT
Start: 2020-09-17 | End: 2020-09-17

## 2020-09-17 RX ORDER — DEXAMETHASONE SODIUM PHOSPHATE 4 MG/ML
4 INJECTION, SOLUTION INTRA-ARTICULAR; INTRALESIONAL; INTRAMUSCULAR; INTRAVENOUS; SOFT TISSUE
Status: COMPLETED | OUTPATIENT
Start: 2020-09-17 | End: 2020-09-17

## 2020-09-17 RX ADMIN — DEXAMETHASONE SODIUM PHOSPHATE 4 MG: 4 INJECTION, SOLUTION INTRA-ARTICULAR; INTRALESIONAL; INTRAMUSCULAR; INTRAVENOUS; SOFT TISSUE at 12:09

## 2020-09-17 RX ADMIN — KETOROLAC TROMETHAMINE 30 MG: 30 INJECTION, SOLUTION INTRAMUSCULAR at 12:09

## 2020-09-17 NOTE — ED PROVIDER NOTES
Encounter Date: 9/17/2020       History     Chief Complaint   Patient presents with    Sciatica     Pt is having sciatic nerve pain on both sides but the right side is worse.  Pt also has sore on her right heel that they would like for the ER to look at .     69-year-old female presents via EMS from a nursing home for complaints of sciatica to both sides but greater on the right.  Patient also has stage to ulcer to right heel with dressing is dry and intact.  Appears to be healing well        Review of patient's allergies indicates:   Allergen Reactions    Hydroxyzine     Tizanidine      Past Medical History:   Diagnosis Date    Alzheimer disease     Alzheimer disease     Coronary artery disease     Hyperlipidemia     Hypertension     Myopathy     Non-ST elevation (NSTEMI) myocardial infarction     Obesity     Pneumonia     Sleep apnea      Past Surgical History:   Procedure Laterality Date    APPENDECTOMY      APPENDECTOMY      CHOLECYSTECTOMY      CORONARY STENT PLACEMENT      HYSTERECTOMY      TONSILLECTOMY       History reviewed. No pertinent family history.  Social History     Tobacco Use    Smoking status: Unknown If Ever Smoked   Substance Use Topics    Alcohol use: Not Currently    Drug use: Never     Review of Systems   Constitutional: Negative for fever.   HENT: Negative for sore throat.    Respiratory: Negative for shortness of breath.    Cardiovascular: Negative for chest pain.   Gastrointestinal: Negative for nausea.   Genitourinary: Negative for dysuria.   Musculoskeletal: Positive for back pain and gait problem.   Skin: Positive for wound. Negative for rash.   Neurological: Negative for weakness.   Hematological: Does not bruise/bleed easily.   All other systems reviewed and are negative.      Physical Exam     Initial Vitals [09/17/20 1149]   BP Pulse Resp Temp SpO2   (!) 149/81 66 18 98.2 °F (36.8 °C) 95 %      MAP       --         Physical Exam    Nursing note and vitals  reviewed.  Constitutional: She appears well-developed and well-nourished.   HENT:   Head: Normocephalic and atraumatic.   Eyes: Pupils are equal, round, and reactive to light.   Neck: Normal range of motion.   Cardiovascular: Normal rate and regular rhythm.   Pulmonary/Chest: Breath sounds normal.   Abdominal: Soft. Bowel sounds are normal.   Musculoskeletal: Normal range of motion.   Neurological: She is alert and oriented to person, place, and time.   Skin: There is erythema.   Healing stage II wound to right heel.  Dressing dry and intact   Psychiatric: She has a normal mood and affect.         ED Course   Procedures  Labs Reviewed - No data to display       Imaging Results    None          Medical Decision Making:   Differential Diagnosis:   Stage II decubitus, sciatica, low back pain                             Clinical Impression:     ICD-10-CM ICD-9-CM   1. Bilateral sciatica  M54.31 724.3    M54.32    2. Pressure injury of right heel, stage 2  L89.612 707.07     707.22                          ED Disposition Condition    Discharge Stable        ED Prescriptions     None        Follow-up Information    None                                      Debra Rosas NP  09/17/20 [id number]

## 2020-09-23 ENCOUNTER — HOSPITAL ENCOUNTER (INPATIENT)
Facility: HOSPITAL | Age: 70
LOS: 8 days | Discharge: HOME HEALTH | DRG: 193 | End: 2020-10-01
Attending: EMERGENCY MEDICINE | Admitting: EMERGENCY MEDICINE
Payer: MEDICARE

## 2020-09-23 DIAGNOSIS — J18.9 HOSPITAL-ACQUIRED PNEUMONIA: Primary | ICD-10-CM

## 2020-09-23 DIAGNOSIS — R06.02 SHORTNESS OF BREATH: ICD-10-CM

## 2020-09-23 DIAGNOSIS — J96.02 ACUTE RESPIRATORY FAILURE WITH HYPOXIA AND HYPERCARBIA: ICD-10-CM

## 2020-09-23 DIAGNOSIS — J96.01 ACUTE RESPIRATORY FAILURE WITH HYPOXIA AND HYPERCARBIA: ICD-10-CM

## 2020-09-23 DIAGNOSIS — L89.611 PRESSURE INJURY OF RIGHT HEEL, STAGE 1: ICD-10-CM

## 2020-09-23 DIAGNOSIS — R09.02 HYPOXIA: ICD-10-CM

## 2020-09-23 DIAGNOSIS — E66.01 SEVERE OBESITY (BMI >= 40): ICD-10-CM

## 2020-09-23 DIAGNOSIS — G72.9 MYOPATHY: ICD-10-CM

## 2020-09-23 DIAGNOSIS — N30.00 ACUTE CYSTITIS WITHOUT HEMATURIA: ICD-10-CM

## 2020-09-23 DIAGNOSIS — J90 PLEURAL EFFUSION: ICD-10-CM
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
## 2020-09-23 LAB
ALBUMIN SERPL BCP-MCNC: 2.4 G/DL (ref 3.5–5.2)
ALP SERPL-CCNC: 117 U/L (ref 55–135)
ALT SERPL W/O P-5'-P-CCNC: 19 U/L (ref 10–44)
ANION GAP SERPL CALC-SCNC: 10 MMOL/L (ref 8–16)
AST SERPL-CCNC: 23 U/L (ref 10–40)
BACTERIA #/AREA URNS HPF: ABNORMAL /HPF
BASOPHILS # BLD AUTO: 0.03 K/UL (ref 0–0.2)
BASOPHILS NFR BLD: 0.1 % (ref 0–1.9)
BILIRUB SERPL-MCNC: 1 MG/DL (ref 0.1–1)
BILIRUB UR QL STRIP: ABNORMAL
BIPAP: ABNORMAL
BUN SERPL-MCNC: 17 MG/DL (ref 8–23)
CALCIUM SERPL-MCNC: 9.6 MG/DL (ref 8.7–10.5)
CHLORIDE SERPL-SCNC: 100 MMOL/L (ref 95–110)
CLARITY UR: ABNORMAL
CO2 SERPL-SCNC: 28 MMOL/L (ref 23–29)
COLOR UR: YELLOW
CORRECTED TEMPERATURE (PCO2): 46.3 MMHG
CORRECTED TEMPERATURE (PH): 7.4
CORRECTED TEMPERATURE (PO2): 83.1 MMHG
CREAT SERPL-MCNC: 1.3 MG/DL (ref 0.5–1.4)
DIFFERENTIAL METHOD: ABNORMAL
EOSINOPHIL # BLD AUTO: 0 K/UL (ref 0–0.5)
EOSINOPHIL NFR BLD: 0 % (ref 0–8)
ERYTHROCYTE [DISTWIDTH] IN BLOOD BY AUTOMATED COUNT: 15.9 % (ref 11.5–14.5)
EST. GFR  (AFRICAN AMERICAN): 47.7 ML/MIN/1.73 M^2
EST. GFR  (NON AFRICAN AMERICAN): 41.4 ML/MIN/1.73 M^2
FIO2: 50 %
GLUCOSE SERPL-MCNC: 120 MG/DL (ref 70–110)
GLUCOSE UR QL STRIP: NEGATIVE
HCO3 UR-SCNC: 27.2 MMOL/L
HCT VFR BLD AUTO: 41.3 % (ref 37–48.5)
HGB BLD-MCNC: 12.4 G/DL (ref 12–16)
HGB UR QL STRIP: ABNORMAL
HYALINE CASTS #/AREA URNS LPF: 0 /LPF
IMM GRANULOCYTES # BLD AUTO: 0.14 K/UL (ref 0–0.04)
IMM GRANULOCYTES NFR BLD AUTO: 0.7 % (ref 0–0.5)
KETONES UR QL STRIP: ABNORMAL
LACTATE SERPL-SCNC: 2 MMOL/L (ref 0.5–2.2)
LEUKOCYTE ESTERASE UR QL STRIP: ABNORMAL
LYMPHOCYTES # BLD AUTO: 2.2 K/UL (ref 1–4.8)
LYMPHOCYTES NFR BLD: 10.8 % (ref 18–48)
MCH RBC QN AUTO: 27.9 PG (ref 27–31)
MCHC RBC AUTO-ENTMCNC: 30 G/DL (ref 32–36)
MCV RBC AUTO: 93 FL (ref 82–98)
MICROSCOPIC COMMENT: ABNORMAL
MONOCYTES # BLD AUTO: 3.3 K/UL (ref 0.3–1)
MONOCYTES NFR BLD: 16.3 % (ref 4–15)
NEUTROPHILS # BLD AUTO: 14.8 K/UL (ref 1.8–7.7)
NEUTROPHILS NFR BLD: 72.1 % (ref 38–73)
NITRITE UR QL STRIP: POSITIVE
NRBC BLD-RTO: 0 /100 WBC
NT-PROBNP: 609 PG/ML (ref 5–900)
O2DEVICE: ABNORMAL
PCO2 BLDA: 46.3 MMHG (ref 35–45)
PH SMN: 7.4 [PH] (ref 7.34–7.45)
PH UR STRIP: 5 [PH] (ref 5–8)
PLATELET # BLD AUTO: 309 K/UL (ref 150–350)
PMV BLD AUTO: 9.3 FL (ref 9.2–12.9)
PO2 BLDA: 83.1 MMHG (ref 80–100)
POC BASE DEFICIT: 3.8 MMOL/L
POC PERFORMED BY: ABNORMAL
POC SATURATED O2: 96.9 %
POC TCO2: 26.1 MMOL/L
POC TEMPERATURE: 37 C
POTASSIUM SERPL-SCNC: 4.4 MMOL/L (ref 3.5–5.1)
PROT SERPL-MCNC: 7.9 G/DL (ref 6–8.4)
PROT UR QL STRIP: ABNORMAL
RBC # BLD AUTO: 4.44 M/UL (ref 4–5.4)
RBC #/AREA URNS HPF: 7 /HPF (ref 0–4)
SARS-COV-2 RDRP RESP QL NAA+PROBE: NEGATIVE
SODIUM SERPL-SCNC: 138 MMOL/L (ref 136–145)
SP GR UR STRIP: 1.02 (ref 1–1.03)
SPECIMEN SOURCE: ABNORMAL
SQUAMOUS #/AREA URNS HPF: 1 /HPF
TROPONIN I SERPL DL<=0.01 NG/ML-MCNC: <0.02 NG/ML (ref 0–0.03)
URN SPEC COLLECT METH UR: ABNORMAL
UROBILINOGEN UR STRIP-ACNC: 1 EU/DL
WBC # BLD AUTO: 20.54 K/UL (ref 3.9–12.7)
WBC #/AREA URNS HPF: 25 /HPF (ref 0–5)

## 2020-09-23 PROCEDURE — 94761 N-INVAS EAR/PLS OXIMETRY MLT: CPT

## 2020-09-23 PROCEDURE — 25000003 PHARM REV CODE 250: Performed by: EMERGENCY MEDICINE

## 2020-09-23 PROCEDURE — 85025 COMPLETE CBC W/AUTO DIFF WBC: CPT

## 2020-09-23 PROCEDURE — 25000003 PHARM REV CODE 250: Performed by: NURSE PRACTITIONER

## 2020-09-23 PROCEDURE — 63600175 PHARM REV CODE 636 W HCPCS: Performed by: EMERGENCY MEDICINE

## 2020-09-23 PROCEDURE — 87186 SC STD MICRODIL/AGAR DIL: CPT

## 2020-09-23 PROCEDURE — 87077 CULTURE AEROBIC IDENTIFY: CPT

## 2020-09-23 PROCEDURE — 27000190 HC CPAP FULL FACE MASK W/VALVE

## 2020-09-23 PROCEDURE — U0002 COVID-19 LAB TEST NON-CDC: HCPCS

## 2020-09-23 PROCEDURE — 94760 N-INVAS EAR/PLS OXIMETRY 1: CPT

## 2020-09-23 PROCEDURE — 83605 ASSAY OF LACTIC ACID: CPT

## 2020-09-23 PROCEDURE — 96365 THER/PROPH/DIAG IV INF INIT: CPT

## 2020-09-23 PROCEDURE — 36600 WITHDRAWAL OF ARTERIAL BLOOD: CPT

## 2020-09-23 PROCEDURE — 87088 URINE BACTERIA CULTURE: CPT

## 2020-09-23 PROCEDURE — 94660 CPAP INITIATION&MGMT: CPT

## 2020-09-23 PROCEDURE — 81000 URINALYSIS NONAUTO W/SCOPE: CPT

## 2020-09-23 PROCEDURE — 99900035 HC TECH TIME PER 15 MIN (STAT)

## 2020-09-23 PROCEDURE — 83880 ASSAY OF NATRIURETIC PEPTIDE: CPT

## 2020-09-23 PROCEDURE — 84484 ASSAY OF TROPONIN QUANT: CPT

## 2020-09-23 PROCEDURE — 87086 URINE CULTURE/COLONY COUNT: CPT

## 2020-09-23 PROCEDURE — P9612 CATHETERIZE FOR URINE SPEC: HCPCS

## 2020-09-23 PROCEDURE — 93005 ELECTROCARDIOGRAM TRACING: CPT

## 2020-09-23 PROCEDURE — 93010 ELECTROCARDIOGRAM REPORT: CPT | Mod: ,,, | Performed by: INTERNAL MEDICINE

## 2020-09-23 PROCEDURE — 87040 BLOOD CULTURE FOR BACTERIA: CPT

## 2020-09-23 PROCEDURE — 36415 COLL VENOUS BLD VENIPUNCTURE: CPT

## 2020-09-23 PROCEDURE — 93010 EKG 12-LEAD: ICD-10-PCS | Mod: ,,, | Performed by: INTERNAL MEDICINE

## 2020-09-23 PROCEDURE — 99900031 HC PATIENT EDUCATION (STAT)

## 2020-09-23 PROCEDURE — 11000001 HC ACUTE MED/SURG PRIVATE ROOM

## 2020-09-23 PROCEDURE — 27000221 HC OXYGEN, UP TO 24 HOURS

## 2020-09-23 PROCEDURE — 80053 COMPREHEN METABOLIC PANEL: CPT

## 2020-09-23 PROCEDURE — 99285 EMERGENCY DEPT VISIT HI MDM: CPT | Mod: 25

## 2020-09-23 PROCEDURE — 82803 BLOOD GASES ANY COMBINATION: CPT

## 2020-09-23 RX ORDER — METOPROLOL SUCCINATE 50 MG/1
50 TABLET, EXTENDED RELEASE ORAL DAILY
Status: DISCONTINUED | OUTPATIENT
Start: 2020-09-24 | End: 2020-10-01 | Stop reason: HOSPADM

## 2020-09-23 RX ORDER — DULOXETIN HYDROCHLORIDE 60 MG/1
60 CAPSULE, DELAYED RELEASE ORAL DAILY
Status: DISCONTINUED | OUTPATIENT
Start: 2020-09-24 | End: 2020-10-01 | Stop reason: HOSPADM

## 2020-09-23 RX ORDER — LISINOPRIL 2.5 MG/1
2.5 TABLET ORAL DAILY
Status: DISCONTINUED | OUTPATIENT
Start: 2020-09-24 | End: 2020-10-01 | Stop reason: HOSPADM

## 2020-09-23 RX ORDER — DONEPEZIL HYDROCHLORIDE 5 MG/1
10 TABLET, FILM COATED ORAL NIGHTLY
Status: DISCONTINUED | OUTPATIENT
Start: 2020-09-23 | End: 2020-10-01 | Stop reason: HOSPADM

## 2020-09-23 RX ORDER — CLOPIDOGREL BISULFATE 75 MG/1
75 TABLET ORAL DAILY
Status: DISCONTINUED | OUTPATIENT
Start: 2020-09-24 | End: 2020-10-01 | Stop reason: HOSPADM

## 2020-09-23 RX ORDER — ASPIRIN 81 MG/1
81 TABLET ORAL DAILY
Status: DISCONTINUED | OUTPATIENT
Start: 2020-09-24 | End: 2020-10-01 | Stop reason: HOSPADM

## 2020-09-23 RX ORDER — HYDROCODONE BITARTRATE AND ACETAMINOPHEN 5; 325 MG/1; MG/1
1 TABLET ORAL ONCE AS NEEDED
Status: COMPLETED | OUTPATIENT
Start: 2020-09-23 | End: 2020-09-23

## 2020-09-23 RX ORDER — SODIUM CHLORIDE 0.9 % (FLUSH) 0.9 %
10 SYRINGE (ML) INJECTION
Status: DISCONTINUED | OUTPATIENT
Start: 2020-09-23 | End: 2020-10-01 | Stop reason: HOSPADM

## 2020-09-23 RX ORDER — PRAVASTATIN SODIUM 40 MG/1
40 TABLET ORAL NIGHTLY
Status: DISCONTINUED | OUTPATIENT
Start: 2020-09-23 | End: 2020-10-01 | Stop reason: HOSPADM

## 2020-09-23 RX ORDER — ISOSORBIDE MONONITRATE 30 MG/1
60 TABLET, EXTENDED RELEASE ORAL DAILY
Status: DISCONTINUED | OUTPATIENT
Start: 2020-09-24 | End: 2020-10-01 | Stop reason: HOSPADM

## 2020-09-23 RX ORDER — HYDROCODONE BITARTRATE AND ACETAMINOPHEN 10; 325 MG/1; MG/1
1 TABLET ORAL 3 TIMES DAILY
Status: ON HOLD | COMMUNITY
End: 2020-09-24

## 2020-09-23 RX ORDER — FAMOTIDINE 20 MG/1
20 TABLET, FILM COATED ORAL 2 TIMES DAILY
Status: DISCONTINUED | OUTPATIENT
Start: 2020-09-23 | End: 2020-10-01 | Stop reason: HOSPADM

## 2020-09-23 RX ORDER — SODIUM CHLORIDE 9 MG/ML
INJECTION, SOLUTION INTRAVENOUS CONTINUOUS
Status: DISCONTINUED | OUTPATIENT
Start: 2020-09-23 | End: 2020-10-01 | Stop reason: HOSPADM

## 2020-09-23 RX ORDER — LINEZOLID 2 MG/ML
600 INJECTION, SOLUTION INTRAVENOUS
Status: DISCONTINUED | OUTPATIENT
Start: 2020-09-23 | End: 2020-10-01 | Stop reason: HOSPADM

## 2020-09-23 RX ORDER — ONDANSETRON 4 MG/1
8 TABLET, ORALLY DISINTEGRATING ORAL EVERY 8 HOURS PRN
Status: DISCONTINUED | OUTPATIENT
Start: 2020-09-23 | End: 2020-10-01 | Stop reason: HOSPADM

## 2020-09-23 RX ORDER — ACETAMINOPHEN 325 MG/1
650 TABLET ORAL EVERY 8 HOURS PRN
Status: DISCONTINUED | OUTPATIENT
Start: 2020-09-23 | End: 2020-10-01 | Stop reason: HOSPADM

## 2020-09-23 RX ADMIN — PIPERACILLIN SODIUM,TAZOBACTAM SODIUM 4.5 G: 4; .5 INJECTION, POWDER, FOR SOLUTION INTRAVENOUS at 10:09

## 2020-09-23 RX ADMIN — LINEZOLID 600 MG: 2 INJECTION, SOLUTION INTRAVENOUS at 05:09

## 2020-09-23 RX ADMIN — ACETAMINOPHEN 650 MG: 325 TABLET ORAL at 08:09

## 2020-09-23 RX ADMIN — ONDANSETRON 8 MG: 4 TABLET, ORALLY DISINTEGRATING ORAL at 09:09

## 2020-09-23 RX ADMIN — FAMOTIDINE 20 MG: 20 TABLET ORAL at 08:09

## 2020-09-23 RX ADMIN — DONEPEZIL HYDROCHLORIDE 10 MG: 5 TABLET, FILM COATED ORAL at 08:09

## 2020-09-23 RX ADMIN — SODIUM CHLORIDE: 0.9 INJECTION, SOLUTION INTRAVENOUS at 05:09

## 2020-09-23 RX ADMIN — PRAVASTATIN SODIUM 40 MG: 40 TABLET ORAL at 08:09

## 2020-09-23 RX ADMIN — HYDROCODONE BITARTRATE AND ACETAMINOPHEN 1 TABLET: 5; 325 TABLET ORAL at 10:09

## 2020-09-23 RX ADMIN — PIPERACILLIN AND TAZOBACTAM 4.5 G: 4; .5 INJECTION, POWDER, LYOPHILIZED, FOR SOLUTION INTRAVENOUS; PARENTERAL at 02:09

## 2020-09-23 NOTE — RESPIRATORY THERAPY
09/23/20 1610   Patient Assessment/Suction   Level of Consciousness (AVPU) alert   Respiratory Effort Mild;Labored   Expansion/Accessory Muscles/Retractions accessory muscle use;abdominal muscle use   Rhythm/Pattern, Respiratory labored;shortness of breath   PRE-TX-O2   O2 Device (Oxygen Therapy) BiPAP   $ Is the patient on Low Flow Oxygen? Yes   Oxygen Concentration (%) 50   SpO2 96 %   Pulse Oximetry Type Intermittent   $ Pulse Oximetry - Multiple Charge Pulse Oximetry - Multiple   Pulse 82   Resp (!) 27   Positioning HOB elevated 30 degrees   Equipment Change   $ RT Equipment large full face mask   Preset CPAP/BiPAP Settings   Mode Of Delivery BiPAP S/T   $ CPAP/BiPAP Daily Charge BiPAP/CPAP Daily   $ Initial CPAP/BiPAP Setup? No   $ Is patient using? Yes   Size of Mask Large  (changed out mask to large full face mask for patient comfort)   Sized Appropriately? Yes   Equipment Type Other (see comment)  (ev 300)   Airway Device Type large full face mask   Humidifier not applicable   Ipap 12   EPAP (cm H2O) 5   Pressure Support (cm H2O) 7   Patient CPAP/BiPAP Settings   RR Total (Breaths/Min) 27   Tidal Volume (mL) 464   VE Minute Ventilation (L/min) 6.1 L/min   Peak Inspiratory Pressure (cm H2O) 11.8   Total Leak (L/Min) 34   CPAP/BiPAP Backup Settings   Backup Rate 8 breaths per minute (bpm)   CPAP/BiPAP Alarms   High Pressure (cm H2O) 20   Low Pressure (cm H2O) 4   High RR (breaths/min) 45   Low RR (breaths/min) 8         Placed patient back on Bipap at this time. No distress noted. Parag BARON at bedside

## 2020-09-23 NOTE — RESPIRATORY THERAPY
09/23/20 1600   PRE-TX-O2   O2 Device (Oxygen Therapy) nasal cannula   $ Is the patient on Low Flow Oxygen? Yes   Flow (L/min) 4   Oxygen Concentration (%) 36   SpO2 (!) 91 %   Pulse Oximetry Type Intermittent   $ Pulse Oximetry - Multiple Charge Pulse Oximetry - Multiple   Pulse 82   Transport Patient   $ Transport Tech Time Charge 15 min   Oxygen Method Nasal cannula   Transport to 3   Toleration Fair       Transported patient and Bipap from ER to room 623. Patient placed on 4lpm nasal cannula for transport. Patient's O2 saturation 91% on 4lpm nasal cannula. Will place patient back on Bipap once in Room 623. Martina Amaya RN with patient and RT for Transport

## 2020-09-23 NOTE — NURSING
Arrived to unit via stretcher. Alert and oriented. Resp even and labored.  Instructed on call bell. BIPAP applied continuous. Bed rails up x2. Call bell in reach.

## 2020-09-23 NOTE — ED PROVIDER NOTES
Encounter Date: 9/23/2020       History     Chief Complaint   Patient presents with    Shortness of Breath     Patient recently discharged from nursing home. SOB since last night. Patient is alert and oriented.       This is a 69-year-old white female history of Alzheimer's disease coronary disease hypertension obstructive sleep apnea obesity presents to the emergency department via EMS with shortness of breath and some lethargy.  History of this in the past was recently admitted and transferred to an outlying facility due to elevated troponins.  Unsure of what happened at outlying facility.  Reviewing old notes states that when she presented here BiPAP improved her mental status tremendously.  Is alert, will wake up when prompted, is talking in complete sentences. Patient is also a DNR per previous ER records and confirmed by EMS        Review of patient's allergies indicates:   Allergen Reactions    Tizanidine      Past Medical History:   Diagnosis Date    Alzheimer disease     Alzheimer disease     Coronary artery disease     Hyperlipidemia     Hypertension     Myopathy     Non-ST elevation (NSTEMI) myocardial infarction     Obesity     Pneumonia     Sleep apnea      Past Surgical History:   Procedure Laterality Date    APPENDECTOMY      APPENDECTOMY      CHOLECYSTECTOMY      CORONARY STENT PLACEMENT      HYSTERECTOMY      TONSILLECTOMY       History reviewed. No pertinent family history.  Social History     Tobacco Use    Smoking status: Unknown If Ever Smoked   Substance Use Topics    Alcohol use: Not Currently    Drug use: Never     Review of Systems   Constitutional: Negative for fever.   HENT: Negative for sore throat.    Respiratory: Positive for shortness of breath.    Cardiovascular: Negative for chest pain.   Gastrointestinal: Negative for nausea.   Genitourinary: Negative for dysuria.   Musculoskeletal: Negative for back pain.   Skin: Negative for rash.   Neurological: Negative for  weakness.        Mild altered mental status   Hematological: Does not bruise/bleed easily.   All other systems reviewed and are negative.      Physical Exam     Initial Vitals [09/23/20 1314]   BP Pulse Resp Temp SpO2   127/79 93 (!) 24 99.6 °F (37.6 °C) 95 %      MAP       --         Physical Exam    Nursing note and vitals reviewed.  Constitutional: She appears well-developed and well-nourished. She is not diaphoretic. No distress.   Morbid obesity   HENT:   Head: Normocephalic and atraumatic.   Eyes: Conjunctivae and EOM are normal. Pupils are equal, round, and reactive to light.   Neck: Normal range of motion. Neck supple.   Cardiovascular: Normal rate, regular rhythm, normal heart sounds and intact distal pulses.   No murmur heard.  Pulmonary/Chest: Breath sounds normal. No respiratory distress. She has no wheezes. She has no rhonchi. She has no rales. She exhibits no tenderness.   Abdominal: Soft. Bowel sounds are normal.   Musculoskeletal: Normal range of motion. No tenderness or edema.      Comments: Pressure ulcer to right heel   Neurological: She is alert and oriented to person, place, and time. She has normal strength and normal reflexes. No cranial nerve deficit. GCS score is 15. GCS eye subscore is 4. GCS verbal subscore is 5. GCS motor subscore is 6.   Skin: Skin is warm and dry. Capillary refill takes less than 2 seconds.         ED Course   Procedures  Labs Reviewed   CBC W/ AUTO DIFFERENTIAL - Abnormal; Notable for the following components:       Result Value    WBC 20.54 (*)     Mean Corpuscular Hemoglobin Conc 30.0 (*)     RDW 15.9 (*)     Immature Granulocytes 0.7 (*)     Gran # (ANC) 14.8 (*)     Immature Grans (Abs) 0.14 (*)     Mono # 3.3 (*)     Lymph% 10.8 (*)     Mono% 16.3 (*)     All other components within normal limits   COMPREHENSIVE METABOLIC PANEL - Abnormal; Notable for the following components:    Glucose 120 (*)     Albumin 2.4 (*)     eGFR if  47.7 (*)     eGFR if  non  41.4 (*)     All other components within normal limits   URINALYSIS, REFLEX TO URINE CULTURE - Abnormal; Notable for the following components:    Appearance, UA Cloudy (*)     Protein, UA Trace (*)     Ketones, UA Trace (*)     Bilirubin (UA) 1+ (*)     Occult Blood UA 3+ (*)     Nitrite, UA Positive (*)     Leukocytes, UA 1+ (*)     All other components within normal limits    Narrative:     Specimen Source->Urine   URINALYSIS MICROSCOPIC - Abnormal; Notable for the following components:    RBC, UA 7 (*)     WBC, UA 25 (*)     Bacteria Many (*)     All other components within normal limits    Narrative:     Specimen Source->Urine   CULTURE, BLOOD   CULTURE, BLOOD   CULTURE, URINE   NT-PRO NATRIURETIC PEPTIDE   LACTIC ACID, PLASMA   TROPONIN I   SARS-COV-2 RNA AMPLIFICATION, QUAL        ECG Results          EKG 12-lead (Final result)  Result time 09/23/20 15:05:23    Final result by Interface, Lab In [facility] (09/23/20 15:05:23)                 Narrative:    Test Reason : R06.02,    Vent. Rate : 094 BPM     Atrial Rate : 094 BPM     P-R Int : 150 ms          QRS Dur : 080 ms      QT Int : 328 ms       P-R-T Axes : 057 017 061 degrees     QTc Int : 410 ms    Normal sinus rhythm  Nonspecific ST and T wave abnormality  Abnormal ECG  When compared with ECG of 10-AUG-2020 06:42,  Nonspecific T wave abnormality now evident in Lateral leads  Confirmed by Willie Matthews MD ([id number]) on 9/23/2020 3:05:10 PM    Referred By: [email or web address]   SELF           Confirmed By:Willie Matthews MD                            Imaging Results          X-Ray Chest 1 View (Final result)  Result time 09/23/20 13:21:09    Final result by Gokul Ding MD (09/23/20 13:21:09)                 Impression:      Left lower lung zone opacity with indistinctness of left hemidiaphragm, may reflect atelectasis or evolving airspace disease with a small left pleural effusion.      Electronically signed by: Gokul Ding  MD  Date:    09/23/2020  Time:    13:21             Narrative:    EXAMINATION:  XR CHEST 1 VIEW    CLINICAL HISTORY:  Shortness of breath    COMPARISON:  Portable chest 08/09/2020, CTA chest with IV contrast 08/09/2020.    FINDINGS:  Frontal chest radiograph is slightly rotated to the right.  There is a mild left lower lung zone opacity.  Indistinctness of the left costophrenic angle.  Mild enlargement of cardiac silhouette.                                 Medical Decision Making:   Differential Diagnosis:     YOVANI, pneumonia, hypercapnia, hypoxia                   ED Course as of Sep 23 1655   Wed Sep 23, 2020   1351 Results for LIV CULP (MRN [id number]) as of 9/23/2020 13:50    9/23/2020 13:36  POC PH: 7.399  POC PCO2: 46.3 (H)  POC PO2: 83.1  POC HCO3: 27.2  POC SATURATED O2: 96.9  POC TCO2: 26.1  FiO2: 50.0  O2DEVICE: BIPAP  Specimen source: Arterial  Base Deficit: 3.8  Performed By:: Lewis  Corrected Temperature (pCO2): 46.3  Corrected Temperature (pO2): 83.1  Correct Temperature (PH): 7.399  BIPAP: 12/5      [SD]   1438  White count is elevated at 20.54    [SD]   1438  COVID is negative    [SD]   1438  Chest x-ray read by radiologist and consistent with left-sided infiltrate    [SD]   1446  Discussed with Dr. Fuller,  and will admit for hospital-acquired pneumonia    [SD]      ED Course User Index  [SD] Bryce Rosado MD            Clinical Impression:       ICD-10-CM ICD-9-CM   1. Hospital-acquired pneumonia  J18.9 486    Y95    2. Shortness of breath  R06.02 786.05                          ED Disposition Condition    Admit                             Bryce Rosado MD  09/23/20 1656

## 2020-09-24 PROBLEM — R09.02 HYPOXIA: Status: ACTIVE | Noted: 2020-09-24

## 2020-09-24 PROBLEM — N39.0 UTI (URINARY TRACT INFECTION): Status: ACTIVE | Noted: 2020-09-24

## 2020-09-24 PROCEDURE — 27000221 HC OXYGEN, UP TO 24 HOURS

## 2020-09-24 PROCEDURE — 94660 CPAP INITIATION&MGMT: CPT

## 2020-09-24 PROCEDURE — 99900035 HC TECH TIME PER 15 MIN (STAT)

## 2020-09-24 PROCEDURE — 94760 N-INVAS EAR/PLS OXIMETRY 1: CPT

## 2020-09-24 PROCEDURE — 94640 AIRWAY INHALATION TREATMENT: CPT

## 2020-09-24 PROCEDURE — 25000242 PHARM REV CODE 250 ALT 637 W/ HCPCS: Performed by: NURSE PRACTITIONER

## 2020-09-24 PROCEDURE — 94761 N-INVAS EAR/PLS OXIMETRY MLT: CPT

## 2020-09-24 PROCEDURE — 25000003 PHARM REV CODE 250: Performed by: EMERGENCY MEDICINE

## 2020-09-24 PROCEDURE — 25000003 PHARM REV CODE 250: Performed by: NURSE PRACTITIONER

## 2020-09-24 PROCEDURE — 63600175 PHARM REV CODE 636 W HCPCS: Performed by: EMERGENCY MEDICINE

## 2020-09-24 PROCEDURE — 99900031 HC PATIENT EDUCATION (STAT)

## 2020-09-24 PROCEDURE — 63600175 PHARM REV CODE 636 W HCPCS: Performed by: NURSE PRACTITIONER

## 2020-09-24 PROCEDURE — 11000001 HC ACUTE MED/SURG PRIVATE ROOM

## 2020-09-24 RX ORDER — HYDROCODONE BITARTRATE AND ACETAMINOPHEN 5; 325 MG/1; MG/1
1 TABLET ORAL EVERY 6 HOURS PRN
Status: DISCONTINUED | OUTPATIENT
Start: 2020-09-24 | End: 2020-09-26

## 2020-09-24 RX ORDER — MEMANTINE HYDROCHLORIDE 7 MG/1
28 CAPSULE, EXTENDED RELEASE ORAL DAILY
Status: DISCONTINUED | OUTPATIENT
Start: 2020-09-25 | End: 2020-10-01 | Stop reason: HOSPADM

## 2020-09-24 RX ORDER — IPRATROPIUM BROMIDE AND ALBUTEROL SULFATE 2.5; .5 MG/3ML; MG/3ML
3 SOLUTION RESPIRATORY (INHALATION) EVERY 4 HOURS
Status: DISCONTINUED | OUTPATIENT
Start: 2020-09-24 | End: 2020-09-29

## 2020-09-24 RX ORDER — CIPROFLOXACIN 2 MG/ML
400 INJECTION, SOLUTION INTRAVENOUS
Status: COMPLETED | OUTPATIENT
Start: 2020-09-24 | End: 2020-10-01

## 2020-09-24 RX ADMIN — IPRATROPIUM BROMIDE AND ALBUTEROL SULFATE 3 ML: 2.5; .5 SOLUTION RESPIRATORY (INHALATION) at 11:09

## 2020-09-24 RX ADMIN — PIPERACILLIN SODIUM,TAZOBACTAM SODIUM 4.5 G: 4; .5 INJECTION, POWDER, FOR SOLUTION INTRAVENOUS at 04:09

## 2020-09-24 RX ADMIN — LINEZOLID 600 MG: 2 INJECTION, SOLUTION INTRAVENOUS at 05:09

## 2020-09-24 RX ADMIN — HYDROCODONE BITARTRATE AND ACETAMINOPHEN 1 TABLET: 5; 325 TABLET ORAL at 08:09

## 2020-09-24 RX ADMIN — DULOXETINE HYDROCHLORIDE 60 MG: 60 CAPSULE, DELAYED RELEASE ORAL at 08:09

## 2020-09-24 RX ADMIN — IPRATROPIUM BROMIDE AND ALBUTEROL SULFATE 3 ML: 2.5; .5 SOLUTION RESPIRATORY (INHALATION) at 04:09

## 2020-09-24 RX ADMIN — FAMOTIDINE 20 MG: 20 TABLET ORAL at 08:09

## 2020-09-24 RX ADMIN — LISINOPRIL 2.5 MG: 2.5 TABLET ORAL at 09:09

## 2020-09-24 RX ADMIN — ASPIRIN 81 MG: 81 TABLET, COATED ORAL at 08:09

## 2020-09-24 RX ADMIN — PIPERACILLIN SODIUM,TAZOBACTAM SODIUM 4.5 G: 4; .5 INJECTION, POWDER, FOR SOLUTION INTRAVENOUS at 10:09

## 2020-09-24 RX ADMIN — SODIUM CHLORIDE: 0.9 INJECTION, SOLUTION INTRAVENOUS at 02:09

## 2020-09-24 RX ADMIN — METOPROLOL SUCCINATE 50 MG: 50 TABLET, EXTENDED RELEASE ORAL at 08:09

## 2020-09-24 RX ADMIN — ISOSORBIDE MONONITRATE 60 MG: 30 TABLET, EXTENDED RELEASE ORAL at 08:09

## 2020-09-24 RX ADMIN — CLOPIDOGREL 75 MG: 75 TABLET, FILM COATED ORAL at 08:09

## 2020-09-24 RX ADMIN — IPRATROPIUM BROMIDE AND ALBUTEROL SULFATE 3 ML: 2.5; .5 SOLUTION RESPIRATORY (INHALATION) at 07:09

## 2020-09-24 RX ADMIN — DONEPEZIL HYDROCHLORIDE 10 MG: 5 TABLET, FILM COATED ORAL at 08:09

## 2020-09-24 RX ADMIN — LINEZOLID 600 MG: 2 INJECTION, SOLUTION INTRAVENOUS at 06:09

## 2020-09-24 RX ADMIN — PIPERACILLIN SODIUM,TAZOBACTAM SODIUM 4.5 G: 4; .5 INJECTION, POWDER, FOR SOLUTION INTRAVENOUS at 06:09

## 2020-09-24 RX ADMIN — CIPROFLOXACIN 400 MG: 2 INJECTION, SOLUTION INTRAVENOUS at 04:09

## 2020-09-24 RX ADMIN — PRAVASTATIN SODIUM 40 MG: 40 TABLET ORAL at 08:09

## 2020-09-24 NOTE — SUBJECTIVE & OBJECTIVE
Past Medical History:   Diagnosis Date    Alzheimer disease     Alzheimer disease     Coronary artery disease     Hyperlipidemia     Hypertension     Myopathy     Non-ST elevation (NSTEMI) myocardial infarction     Obesity     Pneumonia     Sleep apnea        Past Surgical History:   Procedure Laterality Date    APPENDECTOMY      APPENDECTOMY      CHOLECYSTECTOMY      CORONARY STENT PLACEMENT      HYSTERECTOMY      TONSILLECTOMY         Review of patient's allergies indicates:   Allergen Reactions    Tizanidine        No current facility-administered medications on file prior to encounter.      Current Outpatient Medications on File Prior to Encounter   Medication Sig    clopidogreL (PLAVIX) 75 mg tablet Take 1 tablet (75 mg total) by mouth once daily.    donepeziL (ARICEPT) 10 MG tablet Take 10 mg by mouth every evening.    DULoxetine (CYMBALTA) 60 MG capsule Take 60 mg by mouth once daily.    furosemide (LASIX) 40 MG tablet Take 1 tablet (40 mg total) by mouth once daily.    HYDROcodone-acetaminophen (NORCO)  mg per tablet Take 1 tablet by mouth 3 (three) times daily.    isosorbide mononitrate (IMDUR) 60 MG 24 hr tablet Take 60 mg by mouth once daily.    lisinopriL (PRINIVIL,ZESTRIL) 2.5 MG tablet Take 1 tablet (2.5 mg total) by mouth once daily.    memantine (NAMENDA XR) 28 mg CSpX Take 28 mg by mouth once daily.    metoprolol succinate (TOPROL-XL) 50 MG 24 hr tablet Take 50 mg by mouth once daily.    pantoprazole (PROTONIX) 40 MG tablet Take 1 tablet (40 mg total) by mouth before breakfast.    pravastatin (PRAVACHOL) 40 MG tablet Take 40 mg by mouth every evening.    acetaminophen (TYLENOL) 325 MG tablet Take 2 tablets (650 mg total) by mouth every 6 (six) hours as needed (HEADACHE, TEMPERATURE - FEVER > 100.4).    albuterol-ipratropium (DUO-NEB) 2.5 mg-0.5 mg/3 mL nebulizer solution Take 3 mLs by nebulization every 6 (six) hours as needed for Wheezing or Shortness of Breath.  Rescue    aspirin (ECOTRIN) 81 MG EC tablet Take 81 mg by mouth once daily.    ferrous sulfate 324 mg (65 mg iron) TbEC Take 325 mg by mouth every evening.    miconazole nitrate 2% (MICOTIN) 2 % Oint Apply topically 2 (two) times daily.     Family History     None        Tobacco Use    Smoking status: Unknown If Ever Smoked   Substance and Sexual Activity    Alcohol use: Not Currently    Drug use: Never    Sexual activity: Not Currently     Review of Systems   Constitutional: Positive for activity change, appetite change and fatigue. Negative for chills and fever.   HENT: Negative for ear pain, mouth sores, sinus pressure, sinus pain and sore throat.    Eyes: Negative for photophobia and visual disturbance.   Respiratory: Positive for cough and shortness of breath. Negative for wheezing.    Cardiovascular: Negative for chest pain, palpitations and leg swelling.   Gastrointestinal: Negative for abdominal pain, constipation, diarrhea, nausea and vomiting.   Endocrine: Negative for cold intolerance and heat intolerance.   Genitourinary: Negative for difficulty urinating.   Musculoskeletal: Positive for arthralgias and myalgias.   Skin: Positive for wound. Negative for rash.   Neurological: Positive for weakness. Negative for dizziness and light-headedness.     Objective:     Vital Signs (Most Recent):  Temp: 98.4 °F (36.9 °C) (09/24/20 1032)  Pulse: 83 (09/24/20 1032)  Resp: 19 (09/24/20 1032)  BP: (!) 107/51 (09/24/20 1032)  SpO2: (!) 93 % (09/24/20 1032) Vital Signs (24h Range):  Temp:  [97.6 °F (36.4 °C)-101.1 °F (38.4 °C)] 98.4 °F (36.9 °C)  Pulse:  [55-86] 83  Resp:  [19-30] 19  SpO2:  [78 %-99 %] 93 %  BP: (107-158)/(51-79) 107/51     Weight: (!) 140 kg (308 lb 11.2 oz)  Body mass index is 41.87 kg/m².    Physical Exam  Vitals signs and nursing note reviewed.   Constitutional:       General: She is not in acute distress.     Appearance: She is obese. She is ill-appearing.   HENT:      Head: Normocephalic  and atraumatic.      Mouth/Throat:      Pharynx: Oropharynx is clear.   Eyes:      Extraocular Movements: Extraocular movements intact.   Neck:      Musculoskeletal: Normal range of motion and neck supple.   Cardiovascular:      Rate and Rhythm: Normal rate and regular rhythm.      Pulses: Normal pulses.      Heart sounds: Normal heart sounds.   Pulmonary:      Effort: No respiratory distress.      Breath sounds: Normal breath sounds. No wheezing or rhonchi.      Comments: Wearing bipap.  Abdominal:      General: Bowel sounds are normal.      Palpations: Abdomen is soft.      Tenderness: There is no abdominal tenderness. There is no guarding.   Musculoskeletal:      Right lower leg: Edema (trace) present.      Left lower leg: Edema (trace) present.   Skin:     General: Skin is warm and dry.      Capillary Refill: Capillary refill takes less than 2 seconds.      Comments: Wound with dressing to right heel.   Neurological:      Motor: Weakness present.      Comments: Lethargic.             Significant Labs:   Blood Culture:   Recent Labs   Lab 09/23/20  1422 09/23/20  1433   LABBLOO Gram stain dale bottle: Gram positive cocci in clusters resembling Staph   Results called to and read back by:Parag Galvan RN 09/24/2020  11:30  Gram stain aer bottle: Gram positive cocci in clusters resembling Staph  Positive results previously called 09/24/2020  13:53 Gram stain dale bottle: Gram positive cocci in clusters resembling Staph   Results called to and read back by:Parag Galvan RN 09/24/2020  11:30  Gram stain aer bottle: Gram positive cocci in clusters resembling Staph   Positive results previously called 09/24/2020  13:51     CBC:   Recent Labs   Lab 09/23/20  1347   WBC 20.54*   HGB 12.4   HCT 41.3        CMP:   Recent Labs   Lab 09/23/20  1347      K 4.4      CO2 28   *   BUN 17   CREATININE 1.3   CALCIUM 9.6   PROT 7.9   ALBUMIN 2.4*   BILITOT 1.0   ALKPHOS 117   AST 23   ALT 19   ANIONGAP  10   EGFRNONAA 41.4*     Lactic Acid:   Recent Labs   Lab 09/23/20  1347   LACTATE 2.0     Troponin:   Recent Labs   Lab 09/23/20  1347   TROPONINI <0.020     Urine Culture: No results for input(s): LABURIN in the last 48 hours.  Urine Studies:   Recent Labs   Lab 09/23/20  1457   COLORU Yellow   APPEARANCEUA Cloudy*   PHUR 5.0   SPECGRAV 1.020   PROTEINUA Trace*   GLUCUA Negative   KETONESU Trace*   BILIRUBINUA 1+*   OCCULTUA 3+*   NITRITE Positive*   UROBILINOGEN 1.0   LEUKOCYTESUR 1+*   RBCUA 7*   WBCUA 25*   BACTERIA Many*   SQUAMEPITHEL 1   HYALINECASTS 0     Lab Results   Component Value Date    [id number] Negative 09/23/2020       All pertinent labs within the past 24 hours have been reviewed.    Significant Imaging: I have reviewed all pertinent imaging results/findings within the past 24 hours.

## 2020-09-24 NOTE — PLAN OF CARE
No IM noted in scanned documents or documented.  Irene Vinson was contacted by director JOHN Pino and was informed IM was scanned.

## 2020-09-24 NOTE — HOSPITAL COURSE
09/24/2020 SD: BCx2 positive for gram+ cocci in clusters resembling staph - treat with linezolid, zosyn, and duoneb. Continuous oxygen therapy with bipap qHS. Urine culture pending - empiric treatment with zosyn and cipro. Medical management of Alzheimer's, CAD, and HTN. GI proph. with famotidine. DVT proph. with SCDs  09/25/2020 SD: BCx2 positive for staphylococcus aureus, susceptibility pending - continue Linezolid and duoneb. Urine culture positive for gram negative john, identification and susceptibility pending - continue Zosyn and Cipro. Pt reports back pain - managed with pain medicine. States she is feeling better, requesting PT. No problems overnight.  09/26 : seen and examined, remains in pain and no acute evebnts reported, urine pos for klebsiella , will change abx to cipro and dc zosyn  09/27 : seen and examined, remains in pain and no acute evebnts reported, urine pos for klebsiella , afebrile   09/28/2020 SD: Continue antibiotic therapy. Pt c/o back pain - managed with pain medicine.  09/29/2020 SD: Continue antibiotic therapy to complete minimum 7-day treatment. Continue bipap qHS and PRN. Replete potassium. Pt reports back pain is manageable with pain medicine prescribed. No problems overnight.  09/30/2020 SD: Continue antibiotic therapy to complete minimum 7-day treatment. Continue bipap qHS and PRN. Potassium stabilized. Pt reports improved appetite, requests food vs clear liquid diet. Pt denies any complaints. No problems overnight.  10/01/2020 SD:  7-day antibiotic therapy complete. Pt's condition improved. Denies any c/o with no problems overnight. Plan to discharge home.

## 2020-09-24 NOTE — ASSESSMENT & PLAN NOTE
BC x2 positive for gram+ cocci in clusters resembling staph. Treating with Linezolid, Zosyn, and Duoneb treatments.

## 2020-09-24 NOTE — H&P
[facility] Medicine  History & Physical    Patient Name: Martina Betancourt  MRN: [id number]  Admission Date: 9/23/2020  Attending Physician: Joe Fuller III, MD   Primary Care Provider: Joe Fuller Iii, MD         Patient information was obtained from patient and ER records.     Subjective:     Principal Problem:Hospital-acquired pneumonia    Chief Complaint:   Chief Complaint   Patient presents with    Shortness of Breath     Patient recently discharged from nursing home. SOB since last night. Patient is alert and oriented.         HPI: Pt is a 69-year-old white female history of Alzheimer's disease, CAD, HTN, Obstructive Sleep Apnea, and obesity presents to the [facility] ED via EMS with SOB and lethargy.  Hx of this in the past - recently admitted and transferred to an outlying facility due to elevated troponins.  Pt is alert, will wake up when prompted, is talking in complete sentences. DNR per previous ER records and confirmed by EMS.    Past Medical History:   Diagnosis Date    Alzheimer disease     Alzheimer disease     Coronary artery disease     Hyperlipidemia     Hypertension     Myopathy     Non-ST elevation (NSTEMI) myocardial infarction     Obesity     Pneumonia     Sleep apnea        Past Surgical History:   Procedure Laterality Date    APPENDECTOMY      APPENDECTOMY      CHOLECYSTECTOMY      CORONARY STENT PLACEMENT      HYSTERECTOMY      TONSILLECTOMY         Review of patient's allergies indicates:   Allergen Reactions    Tizanidine        No current facility-administered medications on file prior to encounter.      Current Outpatient Medications on File Prior to Encounter   Medication Sig    clopidogreL (PLAVIX) 75 mg tablet Take 1 tablet (75 mg total) by mouth once daily.    donepeziL (ARICEPT) 10 MG tablet Take 10 mg by mouth every evening.    DULoxetine (CYMBALTA) 60 MG capsule Take 60 mg by mouth once daily.    furosemide (LASIX) 40 MG tablet Take 1  tablet (40 mg total) by mouth once daily.    HYDROcodone-acetaminophen (NORCO)  mg per tablet Take 1 tablet by mouth 3 (three) times daily.    isosorbide mononitrate (IMDUR) 60 MG 24 hr tablet Take 60 mg by mouth once daily.    lisinopriL (PRINIVIL,ZESTRIL) 2.5 MG tablet Take 1 tablet (2.5 mg total) by mouth once daily.    memantine (NAMENDA XR) 28 mg CSpX Take 28 mg by mouth once daily.    metoprolol succinate (TOPROL-XL) 50 MG 24 hr tablet Take 50 mg by mouth once daily.    pantoprazole (PROTONIX) 40 MG tablet Take 1 tablet (40 mg total) by mouth before breakfast.    pravastatin (PRAVACHOL) 40 MG tablet Take 40 mg by mouth every evening.    acetaminophen (TYLENOL) 325 MG tablet Take 2 tablets (650 mg total) by mouth every 6 (six) hours as needed (HEADACHE, TEMPERATURE - FEVER > 100.4).    albuterol-ipratropium (DUO-NEB) 2.5 mg-0.5 mg/3 mL nebulizer solution Take 3 mLs by nebulization every 6 (six) hours as needed for Wheezing or Shortness of Breath. Rescue    aspirin (ECOTRIN) 81 MG EC tablet Take 81 mg by mouth once daily.    ferrous sulfate 324 mg (65 mg iron) TbEC Take 325 mg by mouth every evening.    miconazole nitrate 2% (MICOTIN) 2 % Oint Apply topically 2 (two) times daily.     Family History     None        Tobacco Use    Smoking status: Unknown If Ever Smoked   Substance and Sexual Activity    Alcohol use: Not Currently    Drug use: Never    Sexual activity: Not Currently     Review of Systems   Constitutional: Positive for activity change, appetite change and fatigue. Negative for chills and fever.   HENT: Negative for ear pain, mouth sores, sinus pressure, sinus pain and sore throat.    Eyes: Negative for photophobia and visual disturbance.   Respiratory: Positive for cough and shortness of breath. Negative for wheezing.    Cardiovascular: Negative for chest pain, palpitations and leg swelling.   Gastrointestinal: Negative for abdominal pain, constipation, diarrhea, nausea and  vomiting.   Endocrine: Negative for cold intolerance and heat intolerance.   Genitourinary: Negative for difficulty urinating.   Musculoskeletal: Positive for arthralgias and myalgias.   Skin: Positive for wound. Negative for rash.   Neurological: Positive for weakness. Negative for dizziness and light-headedness.     Objective:     Vital Signs (Most Recent):  Temp: 98.4 °F (36.9 °C) (09/24/20 1032)  Pulse: 83 (09/24/20 1032)  Resp: 19 (09/24/20 1032)  BP: (!) 107/51 (09/24/20 1032)  SpO2: (!) 93 % (09/24/20 1032) Vital Signs (24h Range):  Temp:  [97.6 °F (36.4 °C)-101.1 °F (38.4 °C)] 98.4 °F (36.9 °C)  Pulse:  [55-86] 83  Resp:  [19-30] 19  SpO2:  [78 %-99 %] 93 %  BP: (107-158)/(51-79) 107/51     Weight: (!) 140 kg (308 lb 11.2 oz)  Body mass index is 41.87 kg/m².    Physical Exam  Vitals signs and nursing note reviewed.   Constitutional:       General: She is not in acute distress.     Appearance: She is obese. She is ill-appearing.   HENT:      Head: Normocephalic and atraumatic.      Mouth/Throat:      Pharynx: Oropharynx is clear.   Eyes:      Extraocular Movements: Extraocular movements intact.   Neck:      Musculoskeletal: Normal range of motion and neck supple.   Cardiovascular:      Rate and Rhythm: Normal rate and regular rhythm.      Pulses: Normal pulses.      Heart sounds: Normal heart sounds.   Pulmonary:      Effort: No respiratory distress.      Breath sounds: Normal breath sounds. No wheezing or rhonchi.      Comments: Wearing bipap.  Abdominal:      General: Bowel sounds are normal.      Palpations: Abdomen is soft.      Tenderness: There is no abdominal tenderness. There is no guarding.   Musculoskeletal:      Right lower leg: Edema (trace) present.      Left lower leg: Edema (trace) present.   Skin:     General: Skin is warm and dry.      Capillary Refill: Capillary refill takes less than 2 seconds.      Comments: Wound with dressing to right heel.   Neurological:      Motor: Weakness present.       Comments: Lethargic.             Significant Labs:   Blood Culture:   Recent Labs   Lab 09/23/20  1422 09/23/20  1433   LABBLOO Gram stain dale bottle: Gram positive cocci in clusters resembling Staph   Results called to and read back by:Parag Galvan RN 09/24/2020  11:30  Gram stain aer bottle: Gram positive cocci in clusters resembling Staph  Positive results previously called 09/24/2020  13:53 Gram stain dale bottle: Gram positive cocci in clusters resembling Staph   Results called to and read back by:Parag Galvan RN 09/24/2020  11:30  Gram stain aer bottle: Gram positive cocci in clusters resembling Staph   Positive results previously called 09/24/2020  13:51     CBC:   Recent Labs   Lab 09/23/20  1347   WBC 20.54*   HGB 12.4   HCT 41.3        CMP:   Recent Labs   Lab 09/23/20  1347      K 4.4      CO2 28   *   BUN 17   CREATININE 1.3   CALCIUM 9.6   PROT 7.9   ALBUMIN 2.4*   BILITOT 1.0   ALKPHOS 117   AST 23   ALT 19   ANIONGAP 10   EGFRNONAA 41.4*     Lactic Acid:   Recent Labs   Lab 09/23/20  1347   LACTATE 2.0     Troponin:   Recent Labs   Lab 09/23/20  1347   TROPONINI <0.020     Urine Culture: No results for input(s): LABURIN in the last 48 hours.  Urine Studies:   Recent Labs   Lab 09/23/20  1457   COLORU Yellow   APPEARANCEUA Cloudy*   PHUR 5.0   SPECGRAV 1.020   PROTEINUA Trace*   GLUCUA Negative   KETONESU Trace*   BILIRUBINUA 1+*   OCCULTUA 3+*   NITRITE Positive*   UROBILINOGEN 1.0   LEUKOCYTESUR 1+*   RBCUA 7*   WBCUA 25*   BACTERIA Many*   SQUAMEPITHEL 1   HYALINECASTS 0     Lab Results   Component Value Date    [id number] Negative 09/23/2020       All pertinent labs within the past 24 hours have been reviewed.    Significant Imaging: I have reviewed all pertinent imaging results/findings within the past 24 hours.    Assessment/Plan:     * Hospital-acquired pneumonia  BC x2 positive for gram+ cocci in clusters resembling staph. Treating with Linezolid, Zosyn,  and Duoneb treatments.      Hypoxia  Continuous oxygen therapy with bipap qHS and PRN.      UTI (urinary tract infection)  UC pending. Empiric treatment with Zosyn and Cipro.        VTE Risk Mitigation (From admission, onward)         Ordered     IP VTE HIGH RISK PATIENT  Once      09/23/20 1621     Place sequential compression device  Until discontinued      09/23/20 1621                   Ramona Downey NP  Department of Hospital Medicine   [facility]

## 2020-09-24 NOTE — PLAN OF CARE
09/24/20 1307   Discharge Assessment   Assessment Type Discharge Planning Assessment   Confirmed/corrected address and phone number on facesheet? Yes   Assessment information obtained from? Caregiver   Expected Length of Stay (days) 3   Communicated expected length of stay with patient/caregiver yes   Prior to hospitilization cognitive status: Alert/Oriented   Prior to hospitalization functional status: Partially Dependent  (Able to feed self only if meals prepared)   Current cognitive status: Not Oriented to Person;Not Oriented to Place   Current Functional Status: Completely Dependent   Lives With child(j[place]ica), adult   Able to Return to Prior Arrangements yes   Is patient able to care for self after discharge? No   Who are your caregiver(s) and their phone number(s)? Sharon (daughter) [phone]   Patient's perception of discharge disposition home or selfcare;home health   Readmission Within the Last 30 Days no previous admission in last 30 days   Patient currently being followed by outpatient case management? No   Patient currently receives any other outside agency services? Yes   Name and contact number of agency or person providing outside services [facility]   Equipment Currently Used at Home bedside commode;other (see comments)  (transporting chair)   Do you have any problems affording any of your prescribed medications? No   Is the patient taking medications as prescribed? yes   Does the patient have transportation home? Yes   Transportation Anticipated family or friend will provide   Discharge Plan A Home;Home Health   Discharge Plan B Home;Home Health   DME Needed Upon Discharge  hospital bed  (Daughter requesting hospital bed)   Patient/Family in Agreement with Plan yes     DCP obtained from patient's daughter Sharon.  She states patient was recently at [facility] but is now to discharge home.  She states that they will require a hospital bed and she states that [facility] is currently in  the works of getting them one.  CM did reach out to [facility] who denies them attempting to obtain hospital bed for patient.  CM will speak to physician and request hospital bed be initiated.  Sharon also states that upon discharge, they want to resume services with [facility].

## 2020-09-24 NOTE — PLAN OF CARE
Pt rested in bed with bipap on throughout the night. C/o of pain after being changed,  Called verbalized order for 1 time dose of Norco. Tolerated all meds well.

## 2020-09-24 NOTE — NURSING
After changing pt she c/o of 6/10 pain in her back and legs.temp of 101.1 Notified  On call. Orders for Narco 5 mg 1 times dose.

## 2020-09-24 NOTE — NURSING
ALANIS Greene notified of positive blood cultures +Cocci and clusters resembling STAPH. No new orders noted.

## 2020-09-25 LAB
ALBUMIN SERPL BCP-MCNC: 1.6 G/DL (ref 3.5–5.2)
ALP SERPL-CCNC: 101 U/L (ref 55–135)
ALT SERPL W/O P-5'-P-CCNC: 12 U/L (ref 10–44)
ANION GAP SERPL CALC-SCNC: 3 MMOL/L (ref 8–16)
AST SERPL-CCNC: 12 U/L (ref 10–40)
BACTERIA UR CULT: ABNORMAL
BASOPHILS # BLD AUTO: 0.03 K/UL (ref 0–0.2)
BASOPHILS NFR BLD: 0.1 % (ref 0–1.9)
BILIRUB SERPL-MCNC: 0.6 MG/DL (ref 0.1–1)
BUN SERPL-MCNC: 31 MG/DL (ref 8–23)
CALCIUM SERPL-MCNC: 9 MG/DL (ref 8.7–10.5)
CHLORIDE SERPL-SCNC: 102 MMOL/L (ref 95–110)
CO2 SERPL-SCNC: 32 MMOL/L (ref 23–29)
CREAT SERPL-MCNC: 1.2 MG/DL (ref 0.5–1.4)
DIFFERENTIAL METHOD: ABNORMAL
EOSINOPHIL # BLD AUTO: 0.1 K/UL (ref 0–0.5)
EOSINOPHIL NFR BLD: 0.2 % (ref 0–8)
ERYTHROCYTE [DISTWIDTH] IN BLOOD BY AUTOMATED COUNT: 15.7 % (ref 11.5–14.5)
EST. GFR  (AFRICAN AMERICAN): 52.5 ML/MIN/1.73 M^2
EST. GFR  (NON AFRICAN AMERICAN): 45.6 ML/MIN/1.73 M^2
GLUCOSE SERPL-MCNC: 114 MG/DL (ref 70–110)
HCT VFR BLD AUTO: 31.8 % (ref 37–48.5)
HGB BLD-MCNC: 9.5 G/DL (ref 12–16)
IMM GRANULOCYTES # BLD AUTO: 0.13 K/UL (ref 0–0.04)
IMM GRANULOCYTES NFR BLD AUTO: 0.6 % (ref 0–0.5)
LYMPHOCYTES # BLD AUTO: 2.3 K/UL (ref 1–4.8)
LYMPHOCYTES NFR BLD: 11.5 % (ref 18–48)
MAGNESIUM SERPL-MCNC: 2 MG/DL (ref 1.6–2.6)
MCH RBC QN AUTO: 27.9 PG (ref 27–31)
MCHC RBC AUTO-ENTMCNC: 29.9 G/DL (ref 32–36)
MCV RBC AUTO: 93 FL (ref 82–98)
MONOCYTES # BLD AUTO: 1.9 K/UL (ref 0.3–1)
MONOCYTES NFR BLD: 9.3 % (ref 4–15)
NEUTROPHILS # BLD AUTO: 15.8 K/UL (ref 1.8–7.7)
NEUTROPHILS NFR BLD: 78.3 % (ref 38–73)
NRBC BLD-RTO: 0 /100 WBC
PLATELET # BLD AUTO: 274 K/UL (ref 150–350)
PMV BLD AUTO: 9.9 FL (ref 9.2–12.9)
POTASSIUM SERPL-SCNC: 3.6 MMOL/L (ref 3.5–5.1)
PROT SERPL-MCNC: 6.2 G/DL (ref 6–8.4)
RBC # BLD AUTO: 3.41 M/UL (ref 4–5.4)
SODIUM SERPL-SCNC: 137 MMOL/L (ref 136–145)
WBC # BLD AUTO: 20.18 K/UL (ref 3.9–12.7)

## 2020-09-25 PROCEDURE — 25000242 PHARM REV CODE 250 ALT 637 W/ HCPCS: Performed by: NURSE PRACTITIONER

## 2020-09-25 PROCEDURE — 36415 COLL VENOUS BLD VENIPUNCTURE: CPT

## 2020-09-25 PROCEDURE — 27100171 HC OXYGEN HIGH FLOW UP TO 24 HOURS

## 2020-09-25 PROCEDURE — 27000221 HC OXYGEN, UP TO 24 HOURS

## 2020-09-25 PROCEDURE — 97162 PT EVAL MOD COMPLEX 30 MIN: CPT

## 2020-09-25 PROCEDURE — 99900035 HC TECH TIME PER 15 MIN (STAT)

## 2020-09-25 PROCEDURE — 94640 AIRWAY INHALATION TREATMENT: CPT

## 2020-09-25 PROCEDURE — 63600175 PHARM REV CODE 636 W HCPCS: Performed by: EMERGENCY MEDICINE

## 2020-09-25 PROCEDURE — 85025 COMPLETE CBC W/AUTO DIFF WBC: CPT

## 2020-09-25 PROCEDURE — 94761 N-INVAS EAR/PLS OXIMETRY MLT: CPT

## 2020-09-25 PROCEDURE — 94760 N-INVAS EAR/PLS OXIMETRY 1: CPT

## 2020-09-25 PROCEDURE — 63600175 PHARM REV CODE 636 W HCPCS: Performed by: NURSE PRACTITIONER

## 2020-09-25 PROCEDURE — 25000003 PHARM REV CODE 250: Performed by: EMERGENCY MEDICINE

## 2020-09-25 PROCEDURE — 11000001 HC ACUTE MED/SURG PRIVATE ROOM

## 2020-09-25 PROCEDURE — 25000003 PHARM REV CODE 250: Performed by: NURSE PRACTITIONER

## 2020-09-25 PROCEDURE — 99900031 HC PATIENT EDUCATION (STAT)

## 2020-09-25 PROCEDURE — 97168 OT RE-EVAL EST PLAN CARE: CPT

## 2020-09-25 PROCEDURE — 80053 COMPREHEN METABOLIC PANEL: CPT

## 2020-09-25 PROCEDURE — 94660 CPAP INITIATION&MGMT: CPT

## 2020-09-25 PROCEDURE — 83735 ASSAY OF MAGNESIUM: CPT

## 2020-09-25 PROCEDURE — 97165 OT EVAL LOW COMPLEX 30 MIN: CPT

## 2020-09-25 RX ADMIN — FAMOTIDINE 20 MG: 20 TABLET ORAL at 08:09

## 2020-09-25 RX ADMIN — IPRATROPIUM BROMIDE AND ALBUTEROL SULFATE 3 ML: 2.5; .5 SOLUTION RESPIRATORY (INHALATION) at 07:09

## 2020-09-25 RX ADMIN — HYDROCODONE BITARTRATE AND ACETAMINOPHEN 1 TABLET: 5; 325 TABLET ORAL at 10:09

## 2020-09-25 RX ADMIN — IPRATROPIUM BROMIDE AND ALBUTEROL SULFATE 3 ML: 2.5; .5 SOLUTION RESPIRATORY (INHALATION) at 03:09

## 2020-09-25 RX ADMIN — LINEZOLID 600 MG: 2 INJECTION, SOLUTION INTRAVENOUS at 05:09

## 2020-09-25 RX ADMIN — ASPIRIN 81 MG: 81 TABLET, COATED ORAL at 08:09

## 2020-09-25 RX ADMIN — HYDROCODONE BITARTRATE AND ACETAMINOPHEN 1 TABLET: 5; 325 TABLET ORAL at 05:09

## 2020-09-25 RX ADMIN — FAMOTIDINE 20 MG: 20 TABLET ORAL at 09:09

## 2020-09-25 RX ADMIN — CIPROFLOXACIN 400 MG: 2 INJECTION, SOLUTION INTRAVENOUS at 03:09

## 2020-09-25 RX ADMIN — METOPROLOL SUCCINATE 50 MG: 50 TABLET, EXTENDED RELEASE ORAL at 08:09

## 2020-09-25 RX ADMIN — PRAVASTATIN SODIUM 40 MG: 40 TABLET ORAL at 09:09

## 2020-09-25 RX ADMIN — MEMANTINE HYDROCHLORIDE 28 MG: 7 CAPSULE, EXTENDED RELEASE ORAL at 08:09

## 2020-09-25 RX ADMIN — IPRATROPIUM BROMIDE AND ALBUTEROL SULFATE 3 ML: 2.5; .5 SOLUTION RESPIRATORY (INHALATION) at 04:09

## 2020-09-25 RX ADMIN — CIPROFLOXACIN 400 MG: 2 INJECTION, SOLUTION INTRAVENOUS at 04:09

## 2020-09-25 RX ADMIN — LINEZOLID 600 MG: 2 INJECTION, SOLUTION INTRAVENOUS at 06:09

## 2020-09-25 RX ADMIN — IPRATROPIUM BROMIDE AND ALBUTEROL SULFATE 3 ML: 2.5; .5 SOLUTION RESPIRATORY (INHALATION) at 11:09

## 2020-09-25 RX ADMIN — LISINOPRIL 2.5 MG: 2.5 TABLET ORAL at 08:09

## 2020-09-25 RX ADMIN — CLOPIDOGREL 75 MG: 75 TABLET, FILM COATED ORAL at 08:09

## 2020-09-25 RX ADMIN — DULOXETINE HYDROCHLORIDE 60 MG: 60 CAPSULE, DELAYED RELEASE ORAL at 08:09

## 2020-09-25 RX ADMIN — PIPERACILLIN SODIUM,TAZOBACTAM SODIUM 4.5 G: 4; .5 INJECTION, POWDER, FOR SOLUTION INTRAVENOUS at 02:09

## 2020-09-25 RX ADMIN — ISOSORBIDE MONONITRATE 60 MG: 30 TABLET, EXTENDED RELEASE ORAL at 08:09

## 2020-09-25 RX ADMIN — HYDROCODONE BITARTRATE AND ACETAMINOPHEN 1 TABLET: 5; 325 TABLET ORAL at 04:09

## 2020-09-25 RX ADMIN — PIPERACILLIN SODIUM,TAZOBACTAM SODIUM 4.5 G: 4; .5 INJECTION, POWDER, FOR SOLUTION INTRAVENOUS at 10:09

## 2020-09-25 RX ADMIN — DONEPEZIL HYDROCHLORIDE 10 MG: 5 TABLET, FILM COATED ORAL at 09:09

## 2020-09-25 RX ADMIN — PIPERACILLIN SODIUM,TAZOBACTAM SODIUM 4.5 G: 4; .5 INJECTION, POWDER, FOR SOLUTION INTRAVENOUS at 07:09

## 2020-09-25 NOTE — ASSESSMENT & PLAN NOTE
BCx2 positive for staphylococcus aureus, susceptibility pending - continue Linezolid and duoneb treatments

## 2020-09-25 NOTE — PLAN OF CARE
Pt rested in bed Bipap on. C/o of pain after changing. Tolerated all meds well. Iv intact, infusing fluids per orders

## 2020-09-25 NOTE — PROGRESS NOTES
[facility] Medicine  Progress Note    Patient Name: Martina Betancourt  MRN: [id number]  Patient Class: IP- Inpatient   Admission Date: 9/23/2020  Length of Stay: 2 days  Attending Physician: Joe Fuller III, MD  Primary Care Provider: Joe Fuller Iii, MD        Subjective:     Principal Problem:Hospital-acquired pneumonia        HPI:  Pt is a 69-year-old white female history of Alzheimer's disease, CAD, HTN, Obstructive Sleep Apnea, and obesity presents to the [facility] ED via EMS with SOB and lethargy.  Hx of this in the past - recently admitted and transferred to an outlying facility due to elevated troponins.  Pt is alert, will wake up when prompted, is talking in complete sentences. DNR per previous ER records and confirmed by EMS.    Overview/Hospital Course:  09/24/2020 SD: BCx2 positive for gram+ cocci in clusters resembling staph - treat with linezolid, zosyn, and duoneb. Continuous oxygen therapy with bipap qHS. Urine culture pending - empiric treatment with zosyn and cipro. Medical management of Alzheimer's, CAD, and HTN. GI proph. with famotidine. DVT proph. with SCDs  09/25/2020 SD: BCx2 positive for staphylococcus aureus, susceptibility pending - continue Linezolid and duoneb. Urine culture positive for gram negative john, identification and susceptibility pending - continue Zosyn and Cipro. Pt reports back pain - managed with pain medicine. States she is feeling better, requesting PT. No problems overnight.    Past Medical History:   Diagnosis Date    Alzheimer disease     Alzheimer disease     Coronary artery disease     Hyperlipidemia     Hypertension     Myopathy     Non-ST elevation (NSTEMI) myocardial infarction     Obesity     Pneumonia     Sleep apnea        Past Surgical History:   Procedure Laterality Date    APPENDECTOMY      APPENDECTOMY      CHOLECYSTECTOMY      CORONARY STENT PLACEMENT      HYSTERECTOMY      TONSILLECTOMY         Review of patient's  allergies indicates:   Allergen Reactions    Tizanidine        No current facility-administered medications on file prior to encounter.      Current Outpatient Medications on File Prior to Encounter   Medication Sig    clopidogreL (PLAVIX) 75 mg tablet Take 1 tablet (75 mg total) by mouth once daily.    donepeziL (ARICEPT) 10 MG tablet Take 10 mg by mouth every evening.    DULoxetine (CYMBALTA) 60 MG capsule Take 60 mg by mouth once daily.    furosemide (LASIX) 40 MG tablet Take 1 tablet (40 mg total) by mouth once daily.    isosorbide mononitrate (IMDUR) 60 MG 24 hr tablet Take 60 mg by mouth once daily.    lisinopriL (PRINIVIL,ZESTRIL) 2.5 MG tablet Take 1 tablet (2.5 mg total) by mouth once daily.    memantine (NAMENDA XR) 28 mg CSpX Take 28 mg by mouth once daily.    metoprolol succinate (TOPROL-XL) 50 MG 24 hr tablet Take 50 mg by mouth once daily.    pantoprazole (PROTONIX) 40 MG tablet Take 1 tablet (40 mg total) by mouth before breakfast.    pravastatin (PRAVACHOL) 40 MG tablet Take 40 mg by mouth every evening.    acetaminophen (TYLENOL) 325 MG tablet Take 2 tablets (650 mg total) by mouth every 6 (six) hours as needed (HEADACHE, TEMPERATURE - FEVER > 100.4).    albuterol-ipratropium (DUO-NEB) 2.5 mg-0.5 mg/3 mL nebulizer solution Take 3 mLs by nebulization every 6 (six) hours as needed for Wheezing or Shortness of Breath. Rescue    aspirin (ECOTRIN) 81 MG EC tablet Take 81 mg by mouth once daily.    ferrous sulfate 324 mg (65 mg iron) TbEC Take 325 mg by mouth every evening.    miconazole nitrate 2% (MICOTIN) 2 % Oint Apply topically 2 (two) times daily.     Family History     None        Tobacco Use    Smoking status: Unknown If Ever Smoked   Substance and Sexual Activity    Alcohol use: Not Currently    Drug use: Never    Sexual activity: Not Currently     Review of Systems   Constitutional: Positive for activity change, appetite change and fatigue. Negative for chills and fever.    HENT: Negative for ear pain, mouth sores, sinus pressure, sinus pain and sore throat.    Eyes: Negative for photophobia and visual disturbance.   Respiratory: Positive for cough. Negative for shortness of breath and wheezing.    Cardiovascular: Negative for chest pain, palpitations and leg swelling.   Gastrointestinal: Negative for abdominal pain, constipation, diarrhea, nausea and vomiting.   Endocrine: Negative for cold intolerance and heat intolerance.   Genitourinary: Negative for difficulty urinating.   Musculoskeletal: Positive for back pain and myalgias.   Skin: Positive for wound. Negative for rash.   Neurological: Positive for weakness. Negative for dizziness and light-headedness.     Objective:     Vital Signs (Most Recent):  Temp: 98.1 °F (36.7 °C) (09/25/20 0711)  Pulse: 79 (09/25/20 0711)  Resp: 20 (09/25/20 0711)  BP: (!) 130/56 (09/25/20 0711)  SpO2: (!) 90 % (09/25/20 0711) Vital Signs (24h Range):  Temp:  [98.1 °F (36.7 °C)-99.8 °F (37.7 °C)] 98.1 °F (36.7 °C)  Pulse:  [66-86] 79  Resp:  [15-20] 20  SpO2:  [90 %-100 %] 90 %  BP: (101-130)/(53-62) 130/56     Weight: (!) 140 kg (308 lb 11.2 oz)  Body mass index is 41.87 kg/m².    Physical Exam  Vitals signs and nursing note reviewed.   Constitutional:       General: She is not in acute distress.     Appearance: She is obese.   HENT:      Head: Normocephalic and atraumatic.      Mouth/Throat:      Pharynx: Oropharynx is clear.   Eyes:      Extraocular Movements: Extraocular movements intact.   Neck:      Musculoskeletal: Normal range of motion and neck supple.   Cardiovascular:      Rate and Rhythm: Normal rate and regular rhythm.      Pulses: Normal pulses.      Heart sounds: Normal heart sounds.   Pulmonary:      Effort: No respiratory distress.      Breath sounds: Normal breath sounds. No wheezing or rhonchi.      Comments: Wearing bipap.  Abdominal:      General: Bowel sounds are normal.      Palpations: Abdomen is soft.      Tenderness: There is  no abdominal tenderness. There is no guarding.   Musculoskeletal:      Right lower leg: Edema (trace) present.      Left lower leg: Edema (trace) present.   Skin:     General: Skin is warm and dry.      Capillary Refill: Capillary refill takes less than 2 seconds.      Comments: Wound with dressing to right heel.   Neurological:      General: No focal deficit present.      Mental Status: She is oriented to person, place, and time.      Motor: Weakness present.   Psychiatric:         Mood and Affect: Mood normal.         Behavior: Behavior normal.         Thought Content: Thought content normal.         Judgment: Judgment normal.        Significant Labs:   Blood Culture:   Recent Labs   Lab 09/23/20  1422 09/23/20  1433   LABBLOO Gram stain dale bottle: Gram positive cocci in clusters resembling Staph   Results called to and read back by:Parag Galvan RN 09/24/2020  11:30  Gram stain aer bottle: Gram positive cocci in clusters resembling Staph  Positive results previously called 09/24/2020  13:53  STAPHYLOCOCCUS AUREUS  Susceptibility pending  ID consult required at [facility] [place] [address].  * Gram stain dale bottle: Gram positive cocci in clusters resembling Staph   Results called to and read back by:Parag Galvan RN 09/24/2020  11:30  Gram stain aer bottle: Gram positive cocci in clusters resembling Staph   Positive results previously called 09/24/2020  13:51  STAPHYLOCOCCUS AUREUS  ID consult required at [facility].  For susceptibility see order #[id number]  *     CBC:   Recent Labs   Lab 09/23/20  1347 09/25/20  0344   WBC 20.54* 20.18*   HGB 12.4 9.5*   HCT 41.3 31.8*    274     CMP:   Recent Labs   Lab 09/23/20  1347 09/25/20  0344    137   K 4.4 3.6    102   CO2 28 32*   * 114*   BUN 17 31*   CREATININE 1.3 1.2   CALCIUM 9.6 9.0   PROT 7.9 6.2   ALBUMIN 2.4* 1.6*   BILITOT 1.0 0.6   ALKPHOS 117 101   AST 23 12   ALT 19 12    ANIONGAP 10 3*   EGFRNONAA 41.4* 45.6*     Magnesium:   Recent Labs   Lab 09/25/20  0344   MG 2.0     Urine Culture:   Recent Labs   Lab 09/23/20  1457   LABURIN GRAM NEGATIVE KATERINE  >100,000 cfu/ml  Identification and susceptibility pending  *       All pertinent labs within the past 24 hours have been reviewed.    Significant Imaging: I have reviewed all pertinent imaging results/findings within the past 24 hours.      Assessment/Plan:      * Hospital-acquired pneumonia  BCx2 positive for staphylococcus aureus, susceptibility pending - continue Linezolid and duoneb treatments    Hypoxia  Continuous oxygen therapy with bipap qHS and PRN.      UTI (urinary tract infection)  Urine culture positive for gram negative katerine, identification and susceptibility pending - continue Zosyn and Cipro    Myopathy  My patient Martina Betancourt (1950) was last seen and evaluated by me on 09/25/2020. She has a diagnosis of pneumonia, hypoxia, UTI, and myopathy. This causes her to require an electric hospital bed due to an increased risk of aspiration unless the patient's head and upper body are elevated above 30 degrees. Pillows or wedges will not be efficient for this task. I believe an electric hospital bed will provide a safer environment for her by providing variable height feature to assist in transport from bed to wheelchair. As a result, Martina Betancourt can have a better quality of life. Please take into consideration my medical opinion when making a decision for my patient regarding the request for an electric hospital bed.        VTE Risk Mitigation (From admission, onward)         Ordered     IP VTE HIGH RISK PATIENT  Once      09/23/20 1621     Place sequential compression device  Until discontinued      09/23/20 1621                Discharge Planning   BRIT:      Code Status: DNR   Is the patient medically ready for discharge?: No    Reason for patient still in hospital (select all that apply): Patient trending condition,  Laboratory test and Treatment  Discharge Plan A: Home, Home Health                  Ramona Downey NP  Department of Hospital Medicine   [facility]

## 2020-09-25 NOTE — ASSESSMENT & PLAN NOTE
My patient Martina Betancourt (1950) was last seen and evaluated by me on 09/25/2020. She has a diagnosis of pneumonia, hypoxia, UTI, and myopathy. This causes her to require an electric hospital bed due to an increased risk of aspiration unless the patient's head and upper body are elevated above 30 degrees. Pillows or wedges will not be efficient for this task. I believe an electric hospital bed will provide a safer environment for her by providing variable height feature to assist in transport from bed to wheelchair. As a result, Martina Betancourt can have a better quality of life. Please take into consideration my medical opinion when making a decision for my patient regarding the request for an electric hospital bed.

## 2020-09-25 NOTE — PT/OT/SLP EVAL
Physical Therapy Evaluation    Patient Name:  Martina Betancourt   MRN:  [id number]    Recommendations:     Discharge Recommendations:    Home  Discharge Equipment Recommendations:     Barriers to discharge: None    Assessment:     Martina Betancourt is a 69 y.o. female admitted with a medical diagnosis of Hospital-acquired pneumonia.  She presents with the following impairments/functional limitations:    Limited functional mobility, bed mobility, endurance, transfer ability.    Rehab Prognosis: Fair; patient would benefit from acute skilled PT services to address these deficits and reach maximum level of function.    Recent Surgery: * No surgery found *      Plan:     During this hospitalization, patient to be seen   to address the identified rehab impairments via   and progress toward the following goals:    · Plan of Care Expires:  10/02/20    Subjective     Chief Complaint: Back pain, general body pain  Patient/Family Comments/goals: Non stated by patient  Pain/Comfort:  · 10/10 with activity    Patients cultural, spiritual, Hinduism conflicts given the current situation:      Living Environment:  Lives with daughter and family who provide care.  Prior to admission, patients level of function was receiving rehabilitative services at a nursing home. Daughter stated the the most the patient does is [facility]  Equipment used at home:  .  DME owned (not currently used): [employer].  Upon discharge, patient will have assistance from family.    Objective:     Communicated with CNA prior to session.  Patient found supine with    upon PT entry to room.    General Precautions: Standard, fall, other (see comments)(sl\kin breakdown)   Orthopedic Precautions:    Braces:       Exams:  · Cognitive Exam:  Patient is oriented to Person and Place    Functional Mobility:  · Bed Mobility:     · Rolling Left:  moderate assistance  · Rolling Right: moderate assistance  · Supine to Sit: maximal  assistance  · Sit to Supine: maximal assistance    Therapeutic Activities and Exercises:   Worked on sitting balance at EOB    Patient left right sidelying with all lines intact and daughter and RT present.    GOALS:   Multidisciplinary Problems     Physical Therapy Goals        Problem: Physical Therapy Goal    Goal Priority Disciplines Outcome Goal Variances Interventions   Physical Therapy Goal     PT, PT/OT                      History:     Past Medical History:   Diagnosis Date    Alzheimer disease     Alzheimer disease     Coronary artery disease     Hyperlipidemia     Hypertension     Myopathy     Non-ST elevation (NSTEMI) myocardial infarction     Obesity     Pneumonia     Sleep apnea        Past Surgical History:   Procedure Laterality Date    APPENDECTOMY      APPENDECTOMY      CHOLECYSTECTOMY      CORONARY STENT PLACEMENT      HYSTERECTOMY      TONSILLECTOMY         Time Tracking:     PT Received On: 09/25/20  PT Start Time: 1544     PT Stop Time: 1556  PT Total Time (min): 12 min     Billable Minutes: Evaluation 12      Azeem Wheeler, PT  09/25/2020

## 2020-09-25 NOTE — PLAN OF CARE
Plan of care discussed with patient at bedside. Patient had complaints today of pain to back and sides which were relieved with pain medication and positioning. Patient is alert but can be confused at times. Wound to right heel was assessed by Hanna today, New orders in place to provide further care. Will continue to monitor.

## 2020-09-25 NOTE — PT/OT/SLP EVAL
"Occupational Therapy   Evaluation    Name: Martina Betancourt  MRN: [id number]  Admitting Diagnosis:  Hospital-acquired pneumonia      Recommendations:     Discharge Recommendations: nursing facility, skilled  Discharge Equipment Recommendations:     Barriers to discharge:  None    Assessment:     Martina Betancourt is a 69 y.o. female with a medical diagnosis of Hospital-acquired pneumonia.  . Performance deficits affecting function: weakness, impaired endurance, impaired self care skills, impaired functional mobilty, gait instability, impaired balance, decreased upper extremity function, decreased lower extremity function, decreased safety awareness, pain, decreased ROM, impaired skin, edema.      Rehab Prognosis: Fair; patient would benefit from acute skilled OT services to address these deficits and reach maximum level of function.       Plan:     Patient to be seen 6 x/week to address the above listed problems via self-care/home management, therapeutic activities, therapeutic exercises  · Plan of Care Expires: 09/30/20  · Plan of Care Reviewed with: patient, daughter    Subjective     Chief Complaint: "My back hurts"    Patient/Family Comments/goals: To get stronger    Occupational Profile:  Living Environment: was living with daughter   Previous level of function: Min-MAX A with all ADLs.   Roles and Routines: Daughter helped with all t/f and ADLs.   Equipment Used at Home:     Assistance upon Discharge: Assist with t/f and ADLs. Home with daughter or SNF placement.      Pain/Comfort:  · Pain Rating 1: 6/10  · Location - Side 1: Bilateral  · Location 1: back  · Pain Addressed 1: Cessation of Activity, Nurse notified    Patients cultural, spiritual, Hinduism conflicts given the current situation:      Objective:     Communicated with: nursing prior to session.  Patient found supine with oxygen, peripheral IV upon OT entry to room.    General Precautions: Standard, fall   Orthopedic Precautions:N/A   Braces: N/A "     Occupational Performance:    Bed Mobility:    · Patient completed Supine to Sit with maximal assistance  · Patient completed Sit to Supine with maximal assistance    Functional Mobility/Transfers:  · Patient completed Sit <> Stand Transfer with na/  with  na   · Functional Mobility: none attempted    Activities of Daily Living:  · Feeding:  supervision and stand by assistance na  · Grooming: supervision and stand by assistance na\    Cognitive/Visual Perceptual:  Cognitive/Psychosocial Skills:     -       Oriented to: Person, Place, Time and Situation     Physical Exam:  Upper Extremity Range of Motion:     -       Right Upper Extremity: WFL  -       Left Upper Extremity: WFL  Upper Extremity Strength:    -       Right Upper Extremity: Deficits: 2/5  -       Left Upper Extremity: Deficits: 2/5    AMPAC 6 Click ADL:  AMPAC Total Score: 11    Treatment & Education:  Pt evaluated for ADLs, sitting balance, endurance, and strength, bed mobility  Education:    Patient left supine with all lines intact, call button in reach and daughter present    GOALS:   Multidisciplinary Problems     Occupational Therapy Goals        Problem: Occupational Therapy Goal    Goal Priority Disciplines Outcome Interventions   Occupational Therapy Goal     OT, PT/OT     Description: 1.  Pt will sit EOB for 5 minutes and complete grooming activity with set-up assistance.   2.  Pt will complete feeding with MOD I.  3.  Pt will complete BUE exercises as tolerated with no SOB.    4.  Pt will improve supine to sit with MIN A.                     History:     Past Medical History:   Diagnosis Date    Alzheimer disease     Alzheimer disease     Coronary artery disease     Hyperlipidemia     Hypertension     Myopathy     Non-ST elevation (NSTEMI) myocardial infarction     Obesity     Pneumonia     Sleep apnea        Past Surgical History:   Procedure Laterality Date    APPENDECTOMY      APPENDECTOMY      CHOLECYSTECTOMY       CORONARY STENT PLACEMENT      HYSTERECTOMY      TONSILLECTOMY         Time Tracking:     OT Date of Treatment:    OT Start Time: 1544  OT Stop Time: 1556  OT Total Time (min): 12 min    Billable Minutes:Evaluation 12    Farheen Camacho OT  9/25/2020

## 2020-09-25 NOTE — PROGRESS NOTES
09/25/20 1315        Altered Skin Integrity 08/10/20 1700 Right posterior Heel Full thickness tissue loss. Base is covered by slough and/or eschar in the wound bed   Date First Assessed/Time First Assessed: 08/10/20 1700   Altered Skin Integrity Present on Admission: yes  Side: Right  Orientation: posterior  Location: Heel  Description of Altered Skin Integrity: Full thickness tissue loss. Base is covered by sloug...   Wound Image    Description of Altered Skin Integrity Full thickness tissue loss. Base is covered by slough and/or eschar in the wound bed  (Medical device pressure-related injury)   Dressing Appearance Clean;Dry   Drainage Amount Scant   Drainage Characteristics/Odor Serous;No odor   Appearance Pink;Red;Slough   Tissue loss description Full thickness   Red (%), Wound Tissue Color 25 %   Yellow (%), Wound Tissue Color 75 %   Periwound Area Pink;Dry;Intact   Wound Edges Defined   Wound Length (cm) 1.6 cm   Wound Width (cm) 1 cm   Wound Depth (cm) 0.1 cm   Wound Volume (cm^3) 0.16 cm^3   Wound Surface Area (cm^2) 1.6 cm^2   Care Cleansed with:;Wound cleanser;Applied:;Skin Barrier   Dressing Changed;Foam;Other (see comments)  (Opticell Ag+ applied to wound bed)   Periwound Care Skin barrier film applied   Dressing Change Due 09/28/20

## 2020-09-25 NOTE — ASSESSMENT & PLAN NOTE
Urine culture positive for gram negative john, identification and susceptibility pending - continue Zosyn and Cipro

## 2020-09-25 NOTE — SUBJECTIVE & OBJECTIVE
Past Medical History:   Diagnosis Date    Alzheimer disease     Alzheimer disease     Coronary artery disease     Hyperlipidemia     Hypertension     Myopathy     Non-ST elevation (NSTEMI) myocardial infarction     Obesity     Pneumonia     Sleep apnea        Past Surgical History:   Procedure Laterality Date    APPENDECTOMY      APPENDECTOMY      CHOLECYSTECTOMY      CORONARY STENT PLACEMENT      HYSTERECTOMY      TONSILLECTOMY         Review of patient's allergies indicates:   Allergen Reactions    Tizanidine        No current facility-administered medications on file prior to encounter.      Current Outpatient Medications on File Prior to Encounter   Medication Sig    clopidogreL (PLAVIX) 75 mg tablet Take 1 tablet (75 mg total) by mouth once daily.    donepeziL (ARICEPT) 10 MG tablet Take 10 mg by mouth every evening.    DULoxetine (CYMBALTA) 60 MG capsule Take 60 mg by mouth once daily.    furosemide (LASIX) 40 MG tablet Take 1 tablet (40 mg total) by mouth once daily.    isosorbide mononitrate (IMDUR) 60 MG 24 hr tablet Take 60 mg by mouth once daily.    lisinopriL (PRINIVIL,ZESTRIL) 2.5 MG tablet Take 1 tablet (2.5 mg total) by mouth once daily.    memantine (NAMENDA XR) 28 mg CSpX Take 28 mg by mouth once daily.    metoprolol succinate (TOPROL-XL) 50 MG 24 hr tablet Take 50 mg by mouth once daily.    pantoprazole (PROTONIX) 40 MG tablet Take 1 tablet (40 mg total) by mouth before breakfast.    pravastatin (PRAVACHOL) 40 MG tablet Take 40 mg by mouth every evening.    acetaminophen (TYLENOL) 325 MG tablet Take 2 tablets (650 mg total) by mouth every 6 (six) hours as needed (HEADACHE, TEMPERATURE - FEVER > 100.4).    albuterol-ipratropium (DUO-NEB) 2.5 mg-0.5 mg/3 mL nebulizer solution Take 3 mLs by nebulization every 6 (six) hours as needed for Wheezing or Shortness of Breath. Rescue    aspirin (ECOTRIN) 81 MG EC tablet Take 81 mg by mouth once daily.    ferrous sulfate 324 mg  (65 mg iron) TbEC Take 325 mg by mouth every evening.    miconazole nitrate 2% (MICOTIN) 2 % Oint Apply topically 2 (two) times daily.     Family History     None        Tobacco Use    Smoking status: Unknown If Ever Smoked   Substance and Sexual Activity    Alcohol use: Not Currently    Drug use: Never    Sexual activity: Not Currently     Review of Systems   Constitutional: Positive for activity change, appetite change and fatigue. Negative for chills and fever.   HENT: Negative for ear pain, mouth sores, sinus pressure, sinus pain and sore throat.    Eyes: Negative for photophobia and visual disturbance.   Respiratory: Positive for cough. Negative for shortness of breath and wheezing.    Cardiovascular: Negative for chest pain, palpitations and leg swelling.   Gastrointestinal: Negative for abdominal pain, constipation, diarrhea, nausea and vomiting.   Endocrine: Negative for cold intolerance and heat intolerance.   Genitourinary: Negative for difficulty urinating.   Musculoskeletal: Positive for back pain and myalgias.   Skin: Positive for wound. Negative for rash.   Neurological: Positive for weakness. Negative for dizziness and light-headedness.     Objective:     Vital Signs (Most Recent):  Temp: 98.1 °F (36.7 °C) (09/25/20 0711)  Pulse: 79 (09/25/20 0711)  Resp: 20 (09/25/20 0711)  BP: (!) 130/56 (09/25/20 0711)  SpO2: (!) 90 % (09/25/20 0711) Vital Signs (24h Range):  Temp:  [98.1 °F (36.7 °C)-99.8 °F (37.7 °C)] 98.1 °F (36.7 °C)  Pulse:  [66-86] 79  Resp:  [15-20] 20  SpO2:  [90 %-100 %] 90 %  BP: (101-130)/(53-62) 130/56     Weight: (!) 140 kg (308 lb 11.2 oz)  Body mass index is 41.87 kg/m².    Physical Exam  Vitals signs and nursing note reviewed.   Constitutional:       General: She is not in acute distress.     Appearance: She is obese.   HENT:      Head: Normocephalic and atraumatic.      Mouth/Throat:      Pharynx: Oropharynx is clear.   Eyes:      Extraocular Movements: Extraocular movements  intact.   Neck:      Musculoskeletal: Normal range of motion and neck supple.   Cardiovascular:      Rate and Rhythm: Normal rate and regular rhythm.      Pulses: Normal pulses.      Heart sounds: Normal heart sounds.   Pulmonary:      Effort: No respiratory distress.      Breath sounds: Normal breath sounds. No wheezing or rhonchi.      Comments: Wearing bipap.  Abdominal:      General: Bowel sounds are normal.      Palpations: Abdomen is soft.      Tenderness: There is no abdominal tenderness. There is no guarding.   Musculoskeletal:      Right lower leg: Edema (trace) present.      Left lower leg: Edema (trace) present.   Skin:     General: Skin is warm and dry.      Capillary Refill: Capillary refill takes less than 2 seconds.      Comments: Wound with dressing to right heel.   Neurological:      General: No focal deficit present.      Mental Status: She is oriented to person, place, and time.      Motor: Weakness present.   Psychiatric:         Mood and Affect: Mood normal.         Behavior: Behavior normal.         Thought Content: Thought content normal.         Judgment: Judgment normal.        Significant Labs:   Blood Culture:   Recent Labs   Lab 09/23/20  1422 09/23/20  1433   LABBLOO Gram stain dale bottle: Gram positive cocci in clusters resembling Staph   Results called to and read back by:Parag Galvan RN 09/24/2020  11:30  Gram stain aer bottle: Gram positive cocci in clusters resembling Staph  Positive results previously called 09/24/2020  13:53  STAPHYLOCOCCUS AUREUS  Susceptibility pending  ID consult required at [facility] and [facility] locations.  * Gram stain dale bottle: Gram positive cocci in clusters resembling Staph   Results called to and read back by:Parag Galvan RN 09/24/2020  11:30  Gram stain aer bottle: Gram positive cocci in clusters resembling Staph   Positive results previously called 09/24/2020  13:51  STAPHYLOCOCCUS AUREUS  ID consult required at [facility]  and [facility].  For susceptibility see order #[id number]  *     CBC:   Recent Labs   Lab 09/23/20  1347 09/25/20  0344   WBC 20.54* 20.18*   HGB 12.4 9.5*   HCT 41.3 31.8*    274     CMP:   Recent Labs   Lab 09/23/20  1347 09/25/20  0344    137   K 4.4 3.6    102   CO2 28 32*   * 114*   BUN 17 31*   CREATININE 1.3 1.2   CALCIUM 9.6 9.0   PROT 7.9 6.2   ALBUMIN 2.4* 1.6*   BILITOT 1.0 0.6   ALKPHOS 117 101   AST 23 12   ALT 19 12   ANIONGAP 10 3*   EGFRNONAA 41.4* 45.6*     Magnesium:   Recent Labs   Lab 09/25/20  0344   MG 2.0     Urine Culture:   Recent Labs   Lab 09/23/20  1457   LABURIN GRAM NEGATIVE KATERINE  >100,000 cfu/ml  Identification and susceptibility pending  *       All pertinent labs within the past 24 hours have been reviewed.    Significant Imaging: I have reviewed all pertinent imaging results/findings within the past 24 hours.

## 2020-09-25 NOTE — CONSULTS
[facility]  Adult Nutrition  Consult Note    SUMMARY     Recommendations    Recommendation/Intervention: Advanced diet as medically appropriate per MD. Add Cedrick BID.  Goals: Intake of 75%  Nutrition Goal Status: new    Reason for Assessment    Reason For Assessment: consult  Diagnosis: pulmonary disease    Nutrition Risk Screen    Nutrition Risk Screen: large or nonhealing wound, burn or pressure injury    Nutrition/Diet History    Food Allergies: NKFA  Factors Affecting Nutritional Intake: impaired cognitive status/motor control, clear liquid diet    Anthropometrics    Temp: 98.3 °F (36.8 °C)  Height: 6' (182.9 cm)  Height (inches): 72 in  Weight Method: Bed Scale  Weight: (!) 139.7 kg (308 lb)  Weight (lb): (!) 308 lb  Ideal Body Weight (IBW), Female: 160 lb  % Ideal Body Weight, Female (lb): 192.5 %  BMI (Calculated): 41.8  BMI Grade: greater than 40 - morbid obesity       Lab/Procedures/Meds    Pertinent Labs Reviewed: reviewed  Pertinent Medications Reviewed: reviewed    Physical Findings/Assessment         Estimated/Assessed Needs    Weight Used For Calorie Calculations: 99 kg (218 lb 4.1 oz)(aj ibw)  Energy Calorie Requirements (kcal): 2475 kcal(25 kcal/kg aj ibw)  Energy Need Method: Kcal/kg  Protein Requirements: 128 gm(1.3 gm/kg aj ibw)  Weight Used For Protein Calculations: 99 kg (218 lb 4.1 oz)(aj ibw)  Fluid Requirements (mL): 2475 ml  Estimated Fluid Requirement Method: RDA Method  RDA Method (mL): 2475         Nutrition Prescription Ordered    Current Diet Order: Clear liquid    Evaluation of Received Nutrient/Fluid Intake       % Intake of Estimated Energy Needs: 0 - 25 %  % Meal Intake: 0 - 25 %    Nutrition Risk    Level of Risk/Frequency of Follow-up: moderate - high     Assessment and Plan  70 y/o female admitted for hospital acquired pneumonia. PMHx includes HTN, CAD, alzheimer's, HLD, MI, and obesity.    RD consulted for wound. Wound is full thickness located on the right  heel. Currently she is only receiving clear liquids with poor intake. Will add Cedrick BID to diet order.     Nutrition Problem  Increased nutrient needs    Related to (etiology):   Wound healing    Signs and Symptoms (as evidenced by):   Full thickness wound to right heel     Interventions/Recommendations (treatment strategy):  Add Cedrick BID.  Advance diet as medically appropriate per MD.    Nutrition Diagnosis Status:   New    Monitor and Evaluation  Monitor intake, weight, and labs.       Nutrition Follow-Up    RD Follow-up?: Yes   Miles Ball

## 2020-09-25 NOTE — PLAN OF CARE
09/25/20 1200   Post-Acute Status   Post-Acute Authorization HME   HME Status Referrals Sent     [facility] sent [facility] bed.

## 2020-09-26 LAB
ALBUMIN SERPL BCP-MCNC: 1.6 G/DL (ref 3.5–5.2)
ALP SERPL-CCNC: 104 U/L (ref 55–135)
ALT SERPL W/O P-5'-P-CCNC: 20 U/L (ref 10–44)
ANION GAP SERPL CALC-SCNC: 4 MMOL/L (ref 8–16)
AST SERPL-CCNC: 30 U/L (ref 10–40)
BACTERIA BLD CULT: ABNORMAL
BASOPHILS # BLD AUTO: 0.02 K/UL (ref 0–0.2)
BASOPHILS NFR BLD: 0.1 % (ref 0–1.9)
BILIRUB SERPL-MCNC: 0.5 MG/DL (ref 0.1–1)
BUN SERPL-MCNC: 13 MG/DL (ref 8–23)
CALCIUM SERPL-MCNC: 9.2 MG/DL (ref 8.7–10.5)
CHLORIDE SERPL-SCNC: 101 MMOL/L (ref 95–110)
CO2 SERPL-SCNC: 31 MMOL/L (ref 23–29)
CREAT SERPL-MCNC: 0.6 MG/DL (ref 0.5–1.4)
DIFFERENTIAL METHOD: ABNORMAL
EOSINOPHIL # BLD AUTO: 0.1 K/UL (ref 0–0.5)
EOSINOPHIL NFR BLD: 0.4 % (ref 0–8)
ERYTHROCYTE [DISTWIDTH] IN BLOOD BY AUTOMATED COUNT: 15.5 % (ref 11.5–14.5)
EST. GFR  (AFRICAN AMERICAN): >60 ML/MIN/1.73 M^2
EST. GFR  (NON AFRICAN AMERICAN): >60 ML/MIN/1.73 M^2
GLUCOSE SERPL-MCNC: 102 MG/DL (ref 70–110)
HCT VFR BLD AUTO: 32 % (ref 37–48.5)
HGB BLD-MCNC: 9.5 G/DL (ref 12–16)
IMM GRANULOCYTES # BLD AUTO: 0.05 K/UL (ref 0–0.04)
IMM GRANULOCYTES NFR BLD AUTO: 0.3 % (ref 0–0.5)
LYMPHOCYTES # BLD AUTO: 1.5 K/UL (ref 1–4.8)
LYMPHOCYTES NFR BLD: 9.5 % (ref 18–48)
MAGNESIUM SERPL-MCNC: 2 MG/DL (ref 1.6–2.6)
MCH RBC QN AUTO: 27.5 PG (ref 27–31)
MCHC RBC AUTO-ENTMCNC: 29.7 G/DL (ref 32–36)
MCV RBC AUTO: 93 FL (ref 82–98)
MONOCYTES # BLD AUTO: 1.6 K/UL (ref 0.3–1)
MONOCYTES NFR BLD: 10.4 % (ref 4–15)
NEUTROPHILS # BLD AUTO: 12.4 K/UL (ref 1.8–7.7)
NEUTROPHILS NFR BLD: 79.3 % (ref 38–73)
NRBC BLD-RTO: 0 /100 WBC
PLATELET # BLD AUTO: 290 K/UL (ref 150–350)
PMV BLD AUTO: 10 FL (ref 9.2–12.9)
POTASSIUM SERPL-SCNC: 3.4 MMOL/L (ref 3.5–5.1)
PROT SERPL-MCNC: 6.5 G/DL (ref 6–8.4)
RBC # BLD AUTO: 3.46 M/UL (ref 4–5.4)
SODIUM SERPL-SCNC: 136 MMOL/L (ref 136–145)
WBC # BLD AUTO: 15.61 K/UL (ref 3.9–12.7)

## 2020-09-26 PROCEDURE — 80053 COMPREHEN METABOLIC PANEL: CPT

## 2020-09-26 PROCEDURE — 27000221 HC OXYGEN, UP TO 24 HOURS

## 2020-09-26 PROCEDURE — 97110 THERAPEUTIC EXERCISES: CPT

## 2020-09-26 PROCEDURE — 25000242 PHARM REV CODE 250 ALT 637 W/ HCPCS: Performed by: NURSE PRACTITIONER

## 2020-09-26 PROCEDURE — 11000001 HC ACUTE MED/SURG PRIVATE ROOM

## 2020-09-26 PROCEDURE — 36415 COLL VENOUS BLD VENIPUNCTURE: CPT

## 2020-09-26 PROCEDURE — 94640 AIRWAY INHALATION TREATMENT: CPT

## 2020-09-26 PROCEDURE — 97530 THERAPEUTIC ACTIVITIES: CPT

## 2020-09-26 PROCEDURE — 63600175 PHARM REV CODE 636 W HCPCS: Performed by: EMERGENCY MEDICINE

## 2020-09-26 PROCEDURE — 94761 N-INVAS EAR/PLS OXIMETRY MLT: CPT

## 2020-09-26 PROCEDURE — 94660 CPAP INITIATION&MGMT: CPT

## 2020-09-26 PROCEDURE — 85025 COMPLETE CBC W/AUTO DIFF WBC: CPT

## 2020-09-26 PROCEDURE — 87040 BLOOD CULTURE FOR BACTERIA: CPT

## 2020-09-26 PROCEDURE — 25000003 PHARM REV CODE 250: Performed by: EMERGENCY MEDICINE

## 2020-09-26 PROCEDURE — 63600175 PHARM REV CODE 636 W HCPCS: Performed by: NURSE PRACTITIONER

## 2020-09-26 PROCEDURE — 25000003 PHARM REV CODE 250: Performed by: INTERNAL MEDICINE

## 2020-09-26 PROCEDURE — 99900035 HC TECH TIME PER 15 MIN (STAT)

## 2020-09-26 PROCEDURE — 25000003 PHARM REV CODE 250: Performed by: NURSE PRACTITIONER

## 2020-09-26 PROCEDURE — 83735 ASSAY OF MAGNESIUM: CPT

## 2020-09-26 RX ORDER — HYDROCODONE BITARTRATE AND ACETAMINOPHEN 10; 325 MG/1; MG/1
1 TABLET ORAL EVERY 4 HOURS PRN
Status: DISCONTINUED | OUTPATIENT
Start: 2020-09-26 | End: 2020-10-01 | Stop reason: HOSPADM

## 2020-09-26 RX ADMIN — LINEZOLID 600 MG: 2 INJECTION, SOLUTION INTRAVENOUS at 05:09

## 2020-09-26 RX ADMIN — METOPROLOL SUCCINATE 50 MG: 50 TABLET, EXTENDED RELEASE ORAL at 09:09

## 2020-09-26 RX ADMIN — IPRATROPIUM BROMIDE AND ALBUTEROL SULFATE 3 ML: 2.5; .5 SOLUTION RESPIRATORY (INHALATION) at 11:09

## 2020-09-26 RX ADMIN — HYDROCODONE BITARTRATE AND ACETAMINOPHEN 1 TABLET: 5; 325 TABLET ORAL at 09:09

## 2020-09-26 RX ADMIN — CLOPIDOGREL 75 MG: 75 TABLET, FILM COATED ORAL at 09:09

## 2020-09-26 RX ADMIN — FAMOTIDINE 20 MG: 20 TABLET ORAL at 09:09

## 2020-09-26 RX ADMIN — HYDROCODONE BITARTRATE AND ACETAMINOPHEN 1 TABLET: 10; 325 TABLET ORAL at 05:09

## 2020-09-26 RX ADMIN — HYDROCODONE BITARTRATE AND ACETAMINOPHEN 1 TABLET: 5; 325 TABLET ORAL at 04:09

## 2020-09-26 RX ADMIN — SODIUM CHLORIDE: 0.9 INJECTION, SOLUTION INTRAVENOUS at 02:09

## 2020-09-26 RX ADMIN — IPRATROPIUM BROMIDE AND ALBUTEROL SULFATE 3 ML: 2.5; .5 SOLUTION RESPIRATORY (INHALATION) at 07:09

## 2020-09-26 RX ADMIN — ISOSORBIDE MONONITRATE 60 MG: 30 TABLET, EXTENDED RELEASE ORAL at 09:09

## 2020-09-26 RX ADMIN — MEMANTINE HYDROCHLORIDE 28 MG: 7 CAPSULE, EXTENDED RELEASE ORAL at 09:09

## 2020-09-26 RX ADMIN — ASPIRIN 81 MG: 81 TABLET, COATED ORAL at 09:09

## 2020-09-26 RX ADMIN — IPRATROPIUM BROMIDE AND ALBUTEROL SULFATE 3 ML: 2.5; .5 SOLUTION RESPIRATORY (INHALATION) at 04:09

## 2020-09-26 RX ADMIN — IPRATROPIUM BROMIDE AND ALBUTEROL SULFATE 3 ML: 2.5; .5 SOLUTION RESPIRATORY (INHALATION) at 03:09

## 2020-09-26 RX ADMIN — ONDANSETRON 8 MG: 4 TABLET, ORALLY DISINTEGRATING ORAL at 09:09

## 2020-09-26 RX ADMIN — LISINOPRIL 2.5 MG: 2.5 TABLET ORAL at 09:09

## 2020-09-26 RX ADMIN — CIPROFLOXACIN 400 MG: 2 INJECTION, SOLUTION INTRAVENOUS at 03:09

## 2020-09-26 RX ADMIN — IPRATROPIUM BROMIDE AND ALBUTEROL SULFATE 3 ML: 2.5; .5 SOLUTION RESPIRATORY (INHALATION) at 12:09

## 2020-09-26 RX ADMIN — DULOXETINE HYDROCHLORIDE 60 MG: 60 CAPSULE, DELAYED RELEASE ORAL at 09:09

## 2020-09-26 RX ADMIN — CIPROFLOXACIN 400 MG: 2 INJECTION, SOLUTION INTRAVENOUS at 04:09

## 2020-09-26 RX ADMIN — HYDROCODONE BITARTRATE AND ACETAMINOPHEN 1 TABLET: 10; 325 TABLET ORAL at 09:09

## 2020-09-26 RX ADMIN — DONEPEZIL HYDROCHLORIDE 10 MG: 5 TABLET, FILM COATED ORAL at 09:09

## 2020-09-26 RX ADMIN — PIPERACILLIN SODIUM,TAZOBACTAM SODIUM 4.5 G: 4; .5 INJECTION, POWDER, FOR SOLUTION INTRAVENOUS at 06:09

## 2020-09-26 RX ADMIN — PRAVASTATIN SODIUM 40 MG: 40 TABLET ORAL at 09:09

## 2020-09-26 NOTE — PLAN OF CARE
Plan of care discussed with patient. New order for lortab 10mg q4prn for pain that was unrelieved with lortab 5mg q6prn. Will continue to monitor for any changes.

## 2020-09-26 NOTE — PROGRESS NOTES
Physical Therapy  Treatment    Martina Betancourt   MRN: [id number]   Admitting Diagnosis: Hospital-acquired pneumonia                          Billable Minutes:  Therapeutic Activity 10 and Therapeutic Exercise 13                     General Precautions: Standard, fall, other (see comments)(sl\kin breakdown)  Orthopedic Precautions:     Braces:           Subjective:  Communicated with nurse prior to session.           Objective:        Functional Mobility:  Bed Mobility: maxa for sit to supine and supine to sit. She was able to pull herself up in bed with bed tilted downward and rashida       Transfers:Pt went sit to stand with maxa for only a brief period of time       Gait:        Stairs:          Balance:   Static Sit: FAIR+: Able to take MINIMAL challenges from all directions  Dynamic Sit: FAIR: Cannot move trunk without losing balance  Static Stand: 0: Needs MAXIMAL assist to maintain   Dynamic stand: 0: N/A       Therapeutic Activities and Exercises:  Once in sitting Pt performed 2 sets of 10 reps ble arom exs. Her left leg is definitely weaker then her right leg     AM-PAC 6 CLICK MOBILITY  How much help from another person does this patient currently need?   1 = Unable, Total/Dependent Assistance  2 = A lot, Maximum/Moderate Assistance  3 = A little, Minimum/Contact Guard/Supervision  4 = None, Modified Mason/Independent         AM-PAC Raw Score CMS G-Code Modifier Level of Impairment Assistance   6 % Total / Unable   7 - 9 CM 80 - 100% Maximal Assist   10 - 14 CL 60 - 80% Moderate Assist   15 - 19 CK 40 - 60% Moderate Assist   20 - 22 CJ 20 - 40% Minimal Assist   23 CI 1-20% SBA / CGA   24 CH 0% Independent/ Mod I     Patient left supine with bed alarm on.    Assessment:  Martina Betancourt is a 69 y.o. female with a medical diagnosis of Hospital-acquired pneumonia and Pt was able to tolerate slightly more activity then yesterday.    Rehab identified problem list/impairments:      Rehab potential is  fair.    Activity tolerance: Fair    Discharge recommendations:       Barriers to discharge:      Equipment recommendations:       GOALS:   Multidisciplinary Problems     Physical Therapy Goals        Problem: Physical Therapy Goal    Goal Priority Disciplines Outcome Goal Variances Interventions   Physical Therapy Goal     PT, PT/OT                      PLAN:    Patient to be seen    to address the above listed problems via    Plan of Care expires: 10/02/20  Plan of Care reviewed with:           Mitch Richardson, PT  09/26/2020

## 2020-09-26 NOTE — PROGRESS NOTES
Principal Problem:Hospital-acquired pneumonia           HPI:  Pt is a 69-year-old white female history of Alzheimer's disease, CAD, HTN, Obstructive Sleep Apnea, and obesity presents to the [facility] ED via EMS with SOB and lethargy.  Hx of this in the past - recently admitted and transferred to an outlying facility due to elevated troponins.  Pt is alert, will wake up when prompted, is talking in complete sentences. DNR per previous ER records and confirmed by EMS.     Overview/Hospital Course:  09/24/2020 SD: BCx2 positive for gram+ cocci in clusters resembling staph - treat with linezolid, zosyn, and duoneb. Continuous oxygen therapy with bipap qHS. Urine culture pending - empiric treatment with zosyn and cipro. Medical management of Alzheimer's, CAD, and HTN. GI proph. with famotidine. DVT proph. with SCDs  09/25/2020 SD: BCx2 positive for staphylococcus aureus, susceptibility pending - continue Linezolid and duoneb. Urine culture positive for gram negative john, identification and susceptibility pending - continue Zosyn and Cipro. Pt reports back pain - managed with pain medicine. States she is feeling better, requesting PT. No problems overnight.    09/26 : seen and examined, remains in pain and no acute evebnts reported, urine pos for klebsiella , will change abx to cipro and dc zosyn          Past Medical History:   Diagnosis Date    Alzheimer disease      Alzheimer disease      Coronary artery disease      Hyperlipidemia      Hypertension      Myopathy      Non-ST elevation (NSTEMI) myocardial infarction      Obesity      Pneumonia      Sleep apnea                 Past Surgical History:   Procedure Laterality Date    APPENDECTOMY        APPENDECTOMY        CHOLECYSTECTOMY        CORONARY STENT PLACEMENT        HYSTERECTOMY        TONSILLECTOMY                  Review of patient's allergies indicates:   Allergen Reactions    Tizanidine           No current facility-administered medications on file  prior to encounter.            Current Outpatient Medications on File Prior to Encounter   Medication Sig    clopidogreL (PLAVIX) 75 mg tablet Take 1 tablet (75 mg total) by mouth once daily.    donepeziL (ARICEPT) 10 MG tablet Take 10 mg by mouth every evening.    DULoxetine (CYMBALTA) 60 MG capsule Take 60 mg by mouth once daily.    furosemide (LASIX) 40 MG tablet Take 1 tablet (40 mg total) by mouth once daily.    isosorbide mononitrate (IMDUR) 60 MG 24 hr tablet Take 60 mg by mouth once daily.    lisinopriL (PRINIVIL,ZESTRIL) 2.5 MG tablet Take 1 tablet (2.5 mg total) by mouth once daily.    memantine (NAMENDA XR) 28 mg CSpX Take 28 mg by mouth once daily.    metoprolol succinate (TOPROL-XL) 50 MG 24 hr tablet Take 50 mg by mouth once daily.    pantoprazole (PROTONIX) 40 MG tablet Take 1 tablet (40 mg total) by mouth before breakfast.    pravastatin (PRAVACHOL) 40 MG tablet Take 40 mg by mouth every evening.    acetaminophen (TYLENOL) 325 MG tablet Take 2 tablets (650 mg total) by mouth every 6 (six) hours as needed (HEADACHE, TEMPERATURE - FEVER > 100.4).    albuterol-ipratropium (DUO-NEB) 2.5 mg-0.5 mg/3 mL nebulizer solution Take 3 mLs by nebulization every 6 (six) hours as needed for Wheezing or Shortness of Breath. Rescue    aspirin (ECOTRIN) 81 MG EC tablet Take 81 mg by mouth once daily.    ferrous sulfate 324 mg (65 mg iron) TbEC Take 325 mg by mouth every evening.    miconazole nitrate 2% (MICOTIN) 2 % Oint Apply topically 2 (two) times daily.          Family History      None               Tobacco Use    Smoking status: Unknown If Ever Smoked   Substance and Sexual Activity    Alcohol use: Not Currently    Drug use: Never    Sexual activity: Not Currently      Review of Systems   Constitutional: Positive for activity change, appetite change and fatigue. Negative for chills and fever.   HENT: Negative for ear pain, mouth sores, sinus pressure, sinus pain and sore throat.    Eyes:  Negative for photophobia and visual disturbance.   Respiratory: Positive for cough. Negative for shortness of breath and wheezing.    Cardiovascular: Negative for chest pain, palpitations and leg swelling.   Gastrointestinal: Negative for abdominal pain, constipation, diarrhea, nausea and vomiting.   Endocrine: Negative for cold intolerance and heat intolerance.   Genitourinary: Negative for difficulty urinating.   Musculoskeletal: Positive for back pain and myalgias.   Skin: Positive for wound. Negative for rash.   Neurological: Positive for weakness. Negative for dizziness and light-headedness.      Objective:      Vitals:    09/26/20 1148   BP: (!) 142/65   Pulse: 74   Resp: 20   Temp: 98.1 °F (36.7 °C)       Weight: (!) 140 kg (308 lb 11.2 oz)  Body mass index is 41.87 kg/m².     Physical Exam  Vitals signs and nursing note reviewed.   Constitutional:       General: She is not in acute distress.     Appearance: She is obese.   HENT:      Head: Normocephalic and atraumatic.      Mouth/Throat:      Pharynx: Oropharynx is clear.   Eyes:      Extraocular Movements: Extraocular movements intact.   Neck:      Musculoskeletal: Normal range of motion and neck supple.   Cardiovascular:      Rate and Rhythm: Normal rate and regular rhythm.      Pulses: Normal pulses.      Heart sounds: Normal heart sounds.   Pulmonary:      Effort: No respiratory distress.      Breath sounds: Normal breath sounds. No wheezing or rhonchi.      Comments: Wearing bipap.  Abdominal:      General: Bowel sounds are normal.      Palpations: Abdomen is soft.      Tenderness: There is no abdominal tenderness. There is no guarding.   Musculoskeletal:      Right lower leg: Edema (trace) present.      Left lower leg: Edema (trace) present.   Skin:     General: Skin is warm and dry.      Capillary Refill: Capillary refill takes less than 2 seconds.      Comments: Wound with dressing to right heel.   Neurological:      General: No focal deficit  present.      Mental Status: She is oriented to person, place, and time.      Motor: Weakness present.   Psychiatric:         Mood and Affect: Mood normal.         Behavior: Behavior normal.         Thought Content: Thought content normal.         Judgment: Judgment normal.         Significant Labs:   Lab Results   Component Value Date    WBC 15.61 (H) 09/26/2020    HGB 9.5 (L) 09/26/2020    HCT 32.0 (L) 09/26/2020    MCV 93 09/26/2020     09/26/2020       Recent Labs   Lab 09/26/20  0330      K 3.4*      CO2 31*   BUN 13   CREATININE 0.6   CALCIUM 9.2       Significant Imaging: I have reviewed all pertinent imaging results/findings within the past 24 hours.        Assessment/Plan:      * Hospital-acquired pneumonia  BCx2 positive for staphylococcus aureus, susceptibility pending - continue Linezolid and duoneb treatments     Hypoxia  Continuous oxygen therapy with bipap qHS and PRN.        UTI (urinary tract infection)  Urine culture positive for gram negative john, identification and susceptibility pending - continue Zosyn and Cipro     Myopathy  My patient Martina Betancourt ([id number]) was last seen and evaluated by me on 09/25/2020. She has a diagnosis of pneumonia, hypoxia, UTI, and myopathy. This causes her to require an electric hospital bed due to an increased risk of aspiration unless the patient's head and upper body are elevated above 30 degrees. Pillows or wedges will not be efficient for this task. I believe an electric hospital bed will provide a safer environment for her by providing variable height feature to assist in transport from bed to wheelchair. As a result, Martina Betancourt can have a better quality of life. Please take into consideration my medical opinion when making a decision for my patient regarding the request for an electric hospital bed.               VTE Risk Mitigation (From admission, onward)                  Ordered        IP VTE HIGH RISK PATIENT  Once      09/23/20  1621        Place sequential compression device  Until discontinued      09/23/20 1621                     Discharge Planning   BRIT:      Code Status: DNR   Is the patient medically ready for discharge?: No    Reason for patient still in hospital (select all that apply): Patient trending condition, Laboratory test and Treatment  Discharge Plan A: Home, Home Health       Dispo: mrsa bacteremia - continue with abx  Repeat blood cx  afberile  Continue zyvox for now  Dc zosyn

## 2020-09-26 NOTE — PLAN OF CARE
No acute resp distress noted this shift. Pt remained on BiPap this shift. No coughing. No difficulty swallowing liquids. Pt was in a great amount of pain to the lower back area. Manipulating the bed to get in a comfortable position. Relief with pain med, until about 1 and 1/2 to 1 hour before the next dose is due.

## 2020-09-26 NOTE — PT/OT/SLP PROGRESS
Occupational Therapy   Treatment    Name: Martina Betancourt  MRN: [id number]  Admitting Diagnosis:  Hospital-acquired pneumonia       Recommendations:     Discharge Recommendations: nursing facility, skilled  Discharge Equipment Recommendations:     Barriers to discharge:       Assessment:     Martina Betancourt is a 69 y.o. female with a medical diagnosis of Hospital-acquired pneumonia. Performance deficits affecting function are  .     Rehab Prognosis:  Fair; patient would benefit from acute skilled OT services to address these deficits and reach maximum level of function.       Plan:     Patient to be seen 6 x/week to address the above listed problems via self-care/home management, therapeutic activities, therapeutic exercises  · Plan of Care Expires: 09/30/20  · Plan of Care Reviewed with: patient, daughter    Subjective     Pain/Comfort:  · Pain Rating 1: 10/10  · Location - Side 1: Bilateral  · Location - Orientation 1: lower  · Location 1: back  · Pain Addressed 1: Reposition  · Pain Rating Post-Intervention 1: other (see comments)(nursing was entering to give medicine for pain)  · Pain Rating 2: (nursing entering to give med )    Objective:     Communicated with:  Patient found supine with PureWick, peripheral IV, oxygen upon OT entry to room.    General Precautions: Standard, fall, other (see comments)(skin breakdown)   Orthopedic Precautions:N/A   Braces:       Occupational Performance:     Bed Mobility:    · Patient completed Rolling/Turning to Left with  maximal assistance  · Patient completed Supine to Sit with maximal assistance  · Patient completed Sit to Supine with moderate assistance     Functional Mobility/Transfers:  · Not attempted   ·     Activities of Daily Living:   not attempted       [facility] 6 Click ADL: 11    Treatment & Education:  Pt was instructed on bed mobility and supine to EOB to supine mobility. Pt required max A to complete tasks due to weakness and poor endurance. Pt required extended  time due to severe low back pain. Pt benefited from repositioning.     Patient left left sidelying with all lines intact, call button in reach and nurse notifiedEducation:      GOALS:   Multidisciplinary Problems     Occupational Therapy Goals        Problem: Occupational Therapy Goal    Goal Priority Disciplines Outcome Interventions   Occupational Therapy Goal     OT, PT/OT     Description: 1.  Pt will sit EOB for 5 minutes and complete grooming activity with set-up assistance.   2.  Pt will complete feeding with MOD I.  3.  Pt will complete BUE exercises as tolerated with no SOB.    4.  Pt will improve supine to sit with MIN A.                     Time Tracking:     OT Date of Treatment: 09/26/20  OT Start Time: 0955  OT Stop Time: 1010  OT Total Time (min): 15 min    Billable Minutes:Therapeutic Activity 15 min    Clair Amaro OT  9/26/2020

## 2020-09-26 NOTE — CARE UPDATE
09/26/20 0715   Patient Assessment/Suction   Level of Consciousness (AVPU) alert   Respiratory Effort Normal;Unlabored   Expansion/Accessory Muscles/Retractions expansion symmetric   All Lung Fields Breath Sounds clear   Rhythm/Pattern, Respiratory pattern regular   Cough Frequency no cough   PRE-TX-O2   O2 Device (Oxygen Therapy) BiPAP   $ Is the patient on Low Flow Oxygen? Yes   Oxygen Concentration (%) 50   SpO2 96 %   Pulse Oximetry Type Intermittent   $ Pulse Oximetry - Multiple Charge Pulse Oximetry - Multiple   Pulse 71   Resp 18   Positioning Flat   Aerosol Therapy   $ Aerosol Therapy Charges Aerosol Treatment   Daily Review of Necessity (SVN) completed   Respiratory Treatment Status (SVN) given   Treatment Route (SVN) in-line   Patient Position (SVN) other (see comments)  (flat)   Post Treatment Assessment (SVN) increased aeration   Signs of Intolerance (SVN) none   Breath Sounds Post-Respiratory Treatment   Throughout All Fields Post-Treatment All Fields   Throughout All Fields Post-Treatment aeration increased;wheezes, expiratory   Post-treatment Heart Rate (beats/min) 78   Post-treatment Resp Rate (breaths/min) 17   Preset CPAP/BiPAP Settings   Mode Of Delivery BiPAP S/T   $ Is patient using? Yes   Size of Mask Large   Sized Appropriately? Yes   Equipment Type Other (see comment)  ([id number])   Airway Device Type large full face mask   Humidifier not applicable   Ipap 12   EPAP (cm H2O) 5   Pressure Support (cm H2O) 7   Set Rate (Breaths/Min) 8   ITime (sec) 3   Patient CPAP/BiPAP Settings   RR Total (Breaths/Min) 18   Tidal Volume (mL) 440   VE Minute Ventilation (L/min) 8 L/min   Peak Inspiratory Pressure (cm H2O) 11.9   Total Leak (L/Min) 22   Patient Trigger - ST Mode Only (%) 100   CPAP/BiPAP Alarms   Minute Ventilation (L/Min)   (low alarm 4, high alarm 24)   High RR (breaths/min) 50   Low RR (breaths/min) 8   Apnea (Sec) 5   Respiratory Evaluation   $ Care Plan Tech Time 15 min

## 2020-09-27 LAB
ALBUMIN SERPL BCP-MCNC: 1.6 G/DL (ref 3.5–5.2)
ALP SERPL-CCNC: 103 U/L (ref 55–135)
ALT SERPL W/O P-5'-P-CCNC: 24 U/L (ref 10–44)
ANION GAP SERPL CALC-SCNC: 4 MMOL/L (ref 8–16)
AST SERPL-CCNC: 26 U/L (ref 10–40)
BASOPHILS # BLD AUTO: 0.02 K/UL (ref 0–0.2)
BASOPHILS NFR BLD: 0.2 % (ref 0–1.9)
BILIRUB SERPL-MCNC: 0.4 MG/DL (ref 0.1–1)
BUN SERPL-MCNC: 7 MG/DL (ref 8–23)
CALCIUM SERPL-MCNC: 9.2 MG/DL (ref 8.7–10.5)
CHLORIDE SERPL-SCNC: 103 MMOL/L (ref 95–110)
CO2 SERPL-SCNC: 31 MMOL/L (ref 23–29)
CREAT SERPL-MCNC: 0.5 MG/DL (ref 0.5–1.4)
DIFFERENTIAL METHOD: ABNORMAL
EOSINOPHIL # BLD AUTO: 0.1 K/UL (ref 0–0.5)
EOSINOPHIL NFR BLD: 0.5 % (ref 0–8)
ERYTHROCYTE [DISTWIDTH] IN BLOOD BY AUTOMATED COUNT: 15.6 % (ref 11.5–14.5)
EST. GFR  (AFRICAN AMERICAN): >60 ML/MIN/1.73 M^2
EST. GFR  (NON AFRICAN AMERICAN): >60 ML/MIN/1.73 M^2
GLUCOSE SERPL-MCNC: 94 MG/DL (ref 70–110)
HCT VFR BLD AUTO: 34 % (ref 37–48.5)
HGB BLD-MCNC: 10 G/DL (ref 12–16)
IMM GRANULOCYTES # BLD AUTO: 0.04 K/UL (ref 0–0.04)
IMM GRANULOCYTES NFR BLD AUTO: 0.4 % (ref 0–0.5)
LYMPHOCYTES # BLD AUTO: 1.2 K/UL (ref 1–4.8)
LYMPHOCYTES NFR BLD: 10.7 % (ref 18–48)
MAGNESIUM SERPL-MCNC: 1.9 MG/DL (ref 1.6–2.6)
MCH RBC QN AUTO: 27 PG (ref 27–31)
MCHC RBC AUTO-ENTMCNC: 29.4 G/DL (ref 32–36)
MCV RBC AUTO: 92 FL (ref 82–98)
MONOCYTES # BLD AUTO: 1.5 K/UL (ref 0.3–1)
MONOCYTES NFR BLD: 13.4 % (ref 4–15)
NEUTROPHILS # BLD AUTO: 8.3 K/UL (ref 1.8–7.7)
NEUTROPHILS NFR BLD: 74.8 % (ref 38–73)
NRBC BLD-RTO: 0 /100 WBC
PLATELET # BLD AUTO: 291 K/UL (ref 150–350)
PMV BLD AUTO: 10.2 FL (ref 9.2–12.9)
POTASSIUM SERPL-SCNC: 3.4 MMOL/L (ref 3.5–5.1)
PROT SERPL-MCNC: 6.5 G/DL (ref 6–8.4)
RBC # BLD AUTO: 3.7 M/UL (ref 4–5.4)
SODIUM SERPL-SCNC: 138 MMOL/L (ref 136–145)
WBC # BLD AUTO: 11.06 K/UL (ref 3.9–12.7)

## 2020-09-27 PROCEDURE — 80053 COMPREHEN METABOLIC PANEL: CPT

## 2020-09-27 PROCEDURE — 36415 COLL VENOUS BLD VENIPUNCTURE: CPT

## 2020-09-27 PROCEDURE — 63600175 PHARM REV CODE 636 W HCPCS: Performed by: NURSE PRACTITIONER

## 2020-09-27 PROCEDURE — 85025 COMPLETE CBC W/AUTO DIFF WBC: CPT

## 2020-09-27 PROCEDURE — 25000003 PHARM REV CODE 250: Performed by: NURSE PRACTITIONER

## 2020-09-27 PROCEDURE — 27000221 HC OXYGEN, UP TO 24 HOURS

## 2020-09-27 PROCEDURE — 94640 AIRWAY INHALATION TREATMENT: CPT

## 2020-09-27 PROCEDURE — 25000003 PHARM REV CODE 250: Performed by: INTERNAL MEDICINE

## 2020-09-27 PROCEDURE — 11000001 HC ACUTE MED/SURG PRIVATE ROOM

## 2020-09-27 PROCEDURE — 25000003 PHARM REV CODE 250: Performed by: EMERGENCY MEDICINE

## 2020-09-27 PROCEDURE — 63600175 PHARM REV CODE 636 W HCPCS: Performed by: EMERGENCY MEDICINE

## 2020-09-27 PROCEDURE — 83735 ASSAY OF MAGNESIUM: CPT

## 2020-09-27 PROCEDURE — 94761 N-INVAS EAR/PLS OXIMETRY MLT: CPT

## 2020-09-27 PROCEDURE — 25000242 PHARM REV CODE 250 ALT 637 W/ HCPCS: Performed by: NURSE PRACTITIONER

## 2020-09-27 PROCEDURE — 94660 CPAP INITIATION&MGMT: CPT

## 2020-09-27 PROCEDURE — 99900035 HC TECH TIME PER 15 MIN (STAT)

## 2020-09-27 RX ADMIN — LINEZOLID 600 MG: 2 INJECTION, SOLUTION INTRAVENOUS at 05:09

## 2020-09-27 RX ADMIN — ISOSORBIDE MONONITRATE 60 MG: 30 TABLET, EXTENDED RELEASE ORAL at 08:09

## 2020-09-27 RX ADMIN — DONEPEZIL HYDROCHLORIDE 10 MG: 5 TABLET, FILM COATED ORAL at 09:09

## 2020-09-27 RX ADMIN — PRAVASTATIN SODIUM 40 MG: 40 TABLET ORAL at 09:09

## 2020-09-27 RX ADMIN — DULOXETINE HYDROCHLORIDE 60 MG: 60 CAPSULE, DELAYED RELEASE ORAL at 08:09

## 2020-09-27 RX ADMIN — HYDROCODONE BITARTRATE AND ACETAMINOPHEN 1 TABLET: 10; 325 TABLET ORAL at 03:09

## 2020-09-27 RX ADMIN — IPRATROPIUM BROMIDE AND ALBUTEROL SULFATE 3 ML: 2.5; .5 SOLUTION RESPIRATORY (INHALATION) at 12:09

## 2020-09-27 RX ADMIN — IPRATROPIUM BROMIDE AND ALBUTEROL SULFATE 3 ML: 2.5; .5 SOLUTION RESPIRATORY (INHALATION) at 07:09

## 2020-09-27 RX ADMIN — HYDROCODONE BITARTRATE AND ACETAMINOPHEN 1 TABLET: 10; 325 TABLET ORAL at 09:09

## 2020-09-27 RX ADMIN — FAMOTIDINE 20 MG: 20 TABLET ORAL at 08:09

## 2020-09-27 RX ADMIN — METOPROLOL SUCCINATE 50 MG: 50 TABLET, EXTENDED RELEASE ORAL at 08:09

## 2020-09-27 RX ADMIN — LISINOPRIL 2.5 MG: 2.5 TABLET ORAL at 08:09

## 2020-09-27 RX ADMIN — ASPIRIN 81 MG: 81 TABLET, COATED ORAL at 08:09

## 2020-09-27 RX ADMIN — CLOPIDOGREL 75 MG: 75 TABLET, FILM COATED ORAL at 08:09

## 2020-09-27 RX ADMIN — HYDROCODONE BITARTRATE AND ACETAMINOPHEN 1 TABLET: 10; 325 TABLET ORAL at 01:09

## 2020-09-27 RX ADMIN — IPRATROPIUM BROMIDE AND ALBUTEROL SULFATE 3 ML: 2.5; .5 SOLUTION RESPIRATORY (INHALATION) at 11:09

## 2020-09-27 RX ADMIN — SODIUM CHLORIDE: 0.9 INJECTION, SOLUTION INTRAVENOUS at 01:09

## 2020-09-27 RX ADMIN — LINEZOLID 600 MG: 2 INJECTION, SOLUTION INTRAVENOUS at 06:09

## 2020-09-27 RX ADMIN — FAMOTIDINE 20 MG: 20 TABLET ORAL at 09:09

## 2020-09-27 RX ADMIN — IPRATROPIUM BROMIDE AND ALBUTEROL SULFATE 3 ML: 2.5; .5 SOLUTION RESPIRATORY (INHALATION) at 03:09

## 2020-09-27 RX ADMIN — CIPROFLOXACIN 400 MG: 2 INJECTION, SOLUTION INTRAVENOUS at 04:09

## 2020-09-27 RX ADMIN — SODIUM CHLORIDE: 0.9 INJECTION, SOLUTION INTRAVENOUS at 11:09

## 2020-09-27 RX ADMIN — CIPROFLOXACIN 400 MG: 2 INJECTION, SOLUTION INTRAVENOUS at 03:09

## 2020-09-27 RX ADMIN — IPRATROPIUM BROMIDE AND ALBUTEROL SULFATE 3 ML: 2.5; .5 SOLUTION RESPIRATORY (INHALATION) at 04:09

## 2020-09-27 RX ADMIN — MEMANTINE HYDROCHLORIDE 28 MG: 7 CAPSULE, EXTENDED RELEASE ORAL at 08:09

## 2020-09-27 RX ADMIN — HYDROCODONE BITARTRATE AND ACETAMINOPHEN 1 TABLET: 10; 325 TABLET ORAL at 08:09

## 2020-09-27 NOTE — PROGRESS NOTES
Principal Problem:Hospital-acquired pneumonia           HPI:  Pt is a 69-year-old white female history of Alzheimer's disease, CAD, HTN, Obstructive Sleep Apnea, and obesity presents to the [facility] ED via EMS with SOB and lethargy.  Hx of this in the past - recently admitted and transferred to an outlying facility due to elevated troponins.  Pt is alert, will wake up when prompted, is talking in complete sentences. DNR per previous ER records and confirmed by EMS.     Overview/Hospital Course:  09/24/2020 SD: BCx2 positive for gram+ cocci in clusters resembling staph - treat with linezolid, zosyn, and duoneb. Continuous oxygen therapy with bipap qHS. Urine culture pending - empiric treatment with zosyn and cipro. Medical management of Alzheimer's, CAD, and HTN. GI proph. with famotidine. DVT proph. with SCDs  09/25/2020 SD: BCx2 positive for staphylococcus aureus, susceptibility pending - continue Linezolid and duoneb. Urine culture positive for gram negative john, identification and susceptibility pending - continue Zosyn and Cipro. Pt reports back pain - managed with pain medicine. States she is feeling better, requesting PT. No problems overnight.     09/26 : seen and examined, remains in pain and no acute evebnts reported, urine pos for klebsiella , will change abx to cipro and dc zosyn    09/27 : seen and examined, remains in pain and no acute evebnts reported, urine pos for klebsiella , afebrile           Past Medical History:   Diagnosis Date    Alzheimer disease      Alzheimer disease      Coronary artery disease      Hyperlipidemia      Hypertension      Myopathy      Non-ST elevation (NSTEMI) myocardial infarction      Obesity      Pneumonia      Sleep apnea                     Past Surgical History:   Procedure Laterality Date    APPENDECTOMY        APPENDECTOMY        CHOLECYSTECTOMY        CORONARY STENT PLACEMENT        HYSTERECTOMY        TONSILLECTOMY                     Review of  patient's allergies indicates:   Allergen Reactions    Tizanidine           No current facility-administered medications on file prior to encounter.               Current Outpatient Medications on File Prior to Encounter   Medication Sig    clopidogreL (PLAVIX) 75 mg tablet Take 1 tablet (75 mg total) by mouth once daily.    donepeziL (ARICEPT) 10 MG tablet Take 10 mg by mouth every evening.    DULoxetine (CYMBALTA) 60 MG capsule Take 60 mg by mouth once daily.    furosemide (LASIX) 40 MG tablet Take 1 tablet (40 mg total) by mouth once daily.    isosorbide mononitrate (IMDUR) 60 MG 24 hr tablet Take 60 mg by mouth once daily.    lisinopriL (PRINIVIL,ZESTRIL) 2.5 MG tablet Take 1 tablet (2.5 mg total) by mouth once daily.    memantine (NAMENDA XR) 28 mg CSpX Take 28 mg by mouth once daily.    metoprolol succinate (TOPROL-XL) 50 MG 24 hr tablet Take 50 mg by mouth once daily.    pantoprazole (PROTONIX) 40 MG tablet Take 1 tablet (40 mg total) by mouth before breakfast.    pravastatin (PRAVACHOL) 40 MG tablet Take 40 mg by mouth every evening.    acetaminophen (TYLENOL) 325 MG tablet Take 2 tablets (650 mg total) by mouth every 6 (six) hours as needed (HEADACHE, TEMPERATURE - FEVER > 100.4).    albuterol-ipratropium (DUO-NEB) 2.5 mg-0.5 mg/3 mL nebulizer solution Take 3 mLs by nebulization every 6 (six) hours as needed for Wheezing or Shortness of Breath. Rescue    aspirin (ECOTRIN) 81 MG EC tablet Take 81 mg by mouth once daily.    ferrous sulfate 324 mg (65 mg iron) TbEC Take 325 mg by mouth every evening.    miconazole nitrate 2% (MICOTIN) 2 % Oint Apply topically 2 (two) times daily.            Family History      None                  Tobacco Use    Smoking status: Unknown If Ever Smoked   Substance and Sexual Activity    Alcohol use: Not Currently    Drug use: Never    Sexual activity: Not Currently      Review of Systems   Constitutional: Positive for activity change, appetite change and  fatigue. Negative for chills and fever.   HENT: Negative for ear pain, mouth sores, sinus pressure, sinus pain and sore throat.    Eyes: Negative for photophobia and visual disturbance.   Respiratory: Positive for cough. Negative for shortness of breath and wheezing.    Cardiovascular: Negative for chest pain, palpitations and leg swelling.   Gastrointestinal: Negative for abdominal pain, constipation, diarrhea, nausea and vomiting.   Endocrine: Negative for cold intolerance and heat intolerance.   Genitourinary: Negative for difficulty urinating.   Musculoskeletal: Positive for back pain and myalgias.   Skin: Positive for wound. Negative for rash.   Neurological: Positive for weakness. Negative for dizziness and light-headedness.      Objective:      Vitals:    09/27/20 0752 09/27/20 0830 09/27/20 1122 09/27/20 1125   BP: (!) 140/64   138/62   BP Location: Left arm      Patient Position: Lying   Lying   Pulse: 79  67 67   Resp: (!) 22 (!) 22 20 18   Temp: 97.8 °F (36.6 °C)   98.3 °F (36.8 °C)   TempSrc: Oral   Oral   SpO2: (!) 94%  95% 95%   Weight:       Height:              Weight: (!) 140 kg (308 lb 11.2 oz)  Body mass index is 41.87 kg/m².     Physical Exam  Vitals signs and nursing note reviewed.   Constitutional:       General: She is not in acute distress.     Appearance: She is obese.   HENT:      Head: Normocephalic and atraumatic.      Mouth/Throat:      Pharynx: Oropharynx is clear.   Eyes:      Extraocular Movements: Extraocular movements intact.   Neck:      Musculoskeletal: Normal range of motion and neck supple.   Cardiovascular:      Rate and Rhythm: Normal rate and regular rhythm.      Pulses: Normal pulses.      Heart sounds: Normal heart sounds.   Pulmonary:      Effort: No respiratory distress.      Breath sounds: Normal breath sounds. No wheezing or rhonchi.      Comments: Wearing bipap.  Abdominal:      General: Bowel sounds are normal.      Palpations: Abdomen is soft.      Tenderness: There  is no abdominal tenderness. There is no guarding.   Musculoskeletal:      Right lower leg: Edema (trace) present.      Left lower leg: Edema (trace) present.   Skin:     General: Skin is warm and dry.      Capillary Refill: Capillary refill takes less than 2 seconds.      Comments: Wound with dressing to right heel.   Neurological:      General: No focal deficit present.      Mental Status: She is oriented to person, place, and time.      Motor: Weakness present.   Psychiatric:         Mood and Affect: Mood normal.         Behavior: Behavior normal.         Thought Content: Thought content normal.         Judgment: Judgment normal.         Significant Labs:   Lab Results   Component Value Date    WBC 11.06 09/27/2020    HGB 10.0 (L) 09/27/2020    HCT 34.0 (L) 09/27/2020    MCV 92 09/27/2020     09/27/2020       Recent Labs   Lab 09/27/20  0345      K 3.4*      CO2 31*   BUN 7*   CREATININE 0.5   CALCIUM 9.2       Significant Imaging: I have reviewed all pertinent imaging results/findings within the past 24 hours.        Assessment/Plan:      * Hospital-acquired pneumonia  BCx2 positive for staphylococcus aureus, susceptibility pending - continue Linezolid and duoneb treatments     Hypoxia  Continuous oxygen therapy with bipap qHS and PRN.        UTI (urinary tract infection)  Urine culture positive for gram negative john, identification and susceptibility pending - continue Zosyn and Cipro     Myopathy  My patient Martina Betancourt (1950) was last seen and evaluated by me on 09/25/2020. She has a diagnosis of pneumonia, hypoxia, UTI, and myopathy. This causes her to require an electric hospital bed due to an increased risk of aspiration unless the patient's head and upper body are elevated above 30 degrees. Pillows or wedges will not be efficient for this task. I believe an electric hospital bed will provide a safer environment for her by providing variable height feature to assist in transport  from bed to wheelchair. As a result, Martina Betancourt can have a better quality of life. Please take into consideration my medical opinion when making a decision for my patient regarding the request for an electric hospital bed.                       VTE Risk Mitigation (From admission, onward)                        Ordered         IP VTE HIGH RISK PATIENT  Once      09/23/20 1621         Place sequential compression device  Until discontinued      09/23/20 1621                           Discharge Planning   BRIT:      Code Status: DNR   Is the patient medically ready for discharge?: No    Reason for patient still in hospital (select all that apply): Patient trending condition, Laboratory test and Treatment  Discharge Plan A: Home, Home Health         Dispo: mrsa bacteremia - continue with abx  Repeat blood cx  afberile  Continue zyvox for now  Continue cipro  Repeat blood xc neg so far

## 2020-09-27 NOTE — PLAN OF CARE
Plan of care discussed with patient. Patient has pain relieved with the lortab. She is confused to situation at times. Will continue to monitor.

## 2020-09-27 NOTE — PLAN OF CARE
Pt requesting pain med for back pain. No acute resp distress. Requested to have BiPap removed first half of the night.

## 2020-09-27 NOTE — CARE UPDATE
09/27/20 0710   Patient Assessment/Suction   Level of Consciousness (AVPU) alert   Respiratory Effort Normal;Unlabored   Expansion/Accessory Muscles/Retractions expansion symmetric;no use of accessory muscles   All Lung Fields Breath Sounds diminished;clear   Rhythm/Pattern, Respiratory pattern regular   PRE-TX-O2   O2 Device (Oxygen Therapy) nasal cannula   $ Is the patient on Low Flow Oxygen? Yes   Flow (L/min) 4   Oxygen Concentration (%) 36   SpO2 (!) 91 %   Pulse Oximetry Type Intermittent   $ Pulse Oximetry - Multiple Charge Pulse Oximetry - Multiple   Pulse 75   Resp 20   Positioning Flat   Aerosol Therapy   $ Aerosol Therapy Charges Aerosol Treatment   Daily Review of Necessity (SVN) completed   Respiratory Treatment Status (SVN) given   Treatment Route (SVN) mask   Patient Position (SVN) other (see comments)  (flat)   Post Treatment Assessment (SVN) increased aeration   Signs of Intolerance (SVN) none   Breath Sounds Post-Respiratory Treatment   Throughout All Fields Post-Treatment All Fields   Throughout All Fields Post-Treatment wheezes, expiratory   Post-treatment Heart Rate (beats/min) 77   Post-treatment Resp Rate (breaths/min) 20   Respiratory Evaluation   $ Care Plan Tech Time 15 min

## 2020-09-28 LAB
ALBUMIN SERPL BCP-MCNC: 1.6 G/DL (ref 3.5–5.2)
ALP SERPL-CCNC: 96 U/L (ref 55–135)
ALT SERPL W/O P-5'-P-CCNC: 19 U/L (ref 10–44)
ANION GAP SERPL CALC-SCNC: 3 MMOL/L (ref 8–16)
AST SERPL-CCNC: 17 U/L (ref 10–40)
BASOPHILS # BLD AUTO: 0.01 K/UL (ref 0–0.2)
BASOPHILS NFR BLD: 0.1 % (ref 0–1.9)
BILIRUB SERPL-MCNC: 0.3 MG/DL (ref 0.1–1)
BUN SERPL-MCNC: 6 MG/DL (ref 8–23)
CALCIUM SERPL-MCNC: 9.6 MG/DL (ref 8.7–10.5)
CHLORIDE SERPL-SCNC: 102 MMOL/L (ref 95–110)
CO2 SERPL-SCNC: 33 MMOL/L (ref 23–29)
CREAT SERPL-MCNC: 0.5 MG/DL (ref 0.5–1.4)
DIFFERENTIAL METHOD: ABNORMAL
EOSINOPHIL # BLD AUTO: 0.1 K/UL (ref 0–0.5)
EOSINOPHIL NFR BLD: 0.7 % (ref 0–8)
ERYTHROCYTE [DISTWIDTH] IN BLOOD BY AUTOMATED COUNT: 15.8 % (ref 11.5–14.5)
EST. GFR  (AFRICAN AMERICAN): >60 ML/MIN/1.73 M^2
EST. GFR  (NON AFRICAN AMERICAN): >60 ML/MIN/1.73 M^2
GLUCOSE SERPL-MCNC: 116 MG/DL (ref 70–110)
HCT VFR BLD AUTO: 32.3 % (ref 37–48.5)
HGB BLD-MCNC: 9.5 G/DL (ref 12–16)
IMM GRANULOCYTES # BLD AUTO: 0.04 K/UL (ref 0–0.04)
IMM GRANULOCYTES NFR BLD AUTO: 0.4 % (ref 0–0.5)
LYMPHOCYTES # BLD AUTO: 1.4 K/UL (ref 1–4.8)
LYMPHOCYTES NFR BLD: 13.3 % (ref 18–48)
MAGNESIUM SERPL-MCNC: 2 MG/DL (ref 1.6–2.6)
MCH RBC QN AUTO: 27.2 PG (ref 27–31)
MCHC RBC AUTO-ENTMCNC: 29.4 G/DL (ref 32–36)
MCV RBC AUTO: 93 FL (ref 82–98)
MONOCYTES # BLD AUTO: 1.3 K/UL (ref 0.3–1)
MONOCYTES NFR BLD: 12.5 % (ref 4–15)
NEUTROPHILS # BLD AUTO: 7.6 K/UL (ref 1.8–7.7)
NEUTROPHILS NFR BLD: 73 % (ref 38–73)
NRBC BLD-RTO: 0 /100 WBC
PLATELET # BLD AUTO: 347 K/UL (ref 150–350)
PMV BLD AUTO: 9.5 FL (ref 9.2–12.9)
POTASSIUM SERPL-SCNC: 3.3 MMOL/L (ref 3.5–5.1)
PROT SERPL-MCNC: 6.6 G/DL (ref 6–8.4)
RBC # BLD AUTO: 3.49 M/UL (ref 4–5.4)
SODIUM SERPL-SCNC: 138 MMOL/L (ref 136–145)
WBC # BLD AUTO: 10.42 K/UL (ref 3.9–12.7)

## 2020-09-28 PROCEDURE — 94640 AIRWAY INHALATION TREATMENT: CPT

## 2020-09-28 PROCEDURE — 36415 COLL VENOUS BLD VENIPUNCTURE: CPT

## 2020-09-28 PROCEDURE — 97530 THERAPEUTIC ACTIVITIES: CPT

## 2020-09-28 PROCEDURE — 85025 COMPLETE CBC W/AUTO DIFF WBC: CPT

## 2020-09-28 PROCEDURE — 27000221 HC OXYGEN, UP TO 24 HOURS

## 2020-09-28 PROCEDURE — A4216 STERILE WATER/SALINE, 10 ML: HCPCS | Performed by: EMERGENCY MEDICINE

## 2020-09-28 PROCEDURE — 25000242 PHARM REV CODE 250 ALT 637 W/ HCPCS: Performed by: NURSE PRACTITIONER

## 2020-09-28 PROCEDURE — 99900035 HC TECH TIME PER 15 MIN (STAT)

## 2020-09-28 PROCEDURE — 25000003 PHARM REV CODE 250: Performed by: NURSE PRACTITIONER

## 2020-09-28 PROCEDURE — 80053 COMPREHEN METABOLIC PANEL: CPT

## 2020-09-28 PROCEDURE — 94660 CPAP INITIATION&MGMT: CPT

## 2020-09-28 PROCEDURE — 63600175 PHARM REV CODE 636 W HCPCS: Performed by: EMERGENCY MEDICINE

## 2020-09-28 PROCEDURE — 63600175 PHARM REV CODE 636 W HCPCS: Performed by: NURSE PRACTITIONER

## 2020-09-28 PROCEDURE — 25000003 PHARM REV CODE 250: Performed by: EMERGENCY MEDICINE

## 2020-09-28 PROCEDURE — 94761 N-INVAS EAR/PLS OXIMETRY MLT: CPT

## 2020-09-28 PROCEDURE — 99900031 HC PATIENT EDUCATION (STAT)

## 2020-09-28 PROCEDURE — 25000003 PHARM REV CODE 250: Performed by: INTERNAL MEDICINE

## 2020-09-28 PROCEDURE — 83735 ASSAY OF MAGNESIUM: CPT

## 2020-09-28 PROCEDURE — 11000001 HC ACUTE MED/SURG PRIVATE ROOM

## 2020-09-28 RX ADMIN — SODIUM CHLORIDE 10 ML: 9 INJECTION INTRAMUSCULAR; INTRAVENOUS; SUBCUTANEOUS at 09:09

## 2020-09-28 RX ADMIN — DULOXETINE HYDROCHLORIDE 60 MG: 60 CAPSULE, DELAYED RELEASE ORAL at 09:09

## 2020-09-28 RX ADMIN — IPRATROPIUM BROMIDE AND ALBUTEROL SULFATE 3 ML: 2.5; .5 SOLUTION RESPIRATORY (INHALATION) at 12:09

## 2020-09-28 RX ADMIN — MEMANTINE HYDROCHLORIDE 28 MG: 7 CAPSULE, EXTENDED RELEASE ORAL at 09:09

## 2020-09-28 RX ADMIN — PRAVASTATIN SODIUM 40 MG: 40 TABLET ORAL at 10:09

## 2020-09-28 RX ADMIN — ISOSORBIDE MONONITRATE 60 MG: 30 TABLET, EXTENDED RELEASE ORAL at 09:09

## 2020-09-28 RX ADMIN — HYDROCODONE BITARTRATE AND ACETAMINOPHEN 1 TABLET: 10; 325 TABLET ORAL at 09:09

## 2020-09-28 RX ADMIN — IPRATROPIUM BROMIDE AND ALBUTEROL SULFATE 3 ML: 2.5; .5 SOLUTION RESPIRATORY (INHALATION) at 04:09

## 2020-09-28 RX ADMIN — METOPROLOL SUCCINATE 50 MG: 50 TABLET, EXTENDED RELEASE ORAL at 09:09

## 2020-09-28 RX ADMIN — CIPROFLOXACIN 400 MG: 2 INJECTION, SOLUTION INTRAVENOUS at 03:09

## 2020-09-28 RX ADMIN — IPRATROPIUM BROMIDE AND ALBUTEROL SULFATE 3 ML: 2.5; .5 SOLUTION RESPIRATORY (INHALATION) at 07:09

## 2020-09-28 RX ADMIN — LISINOPRIL 2.5 MG: 2.5 TABLET ORAL at 09:09

## 2020-09-28 RX ADMIN — LINEZOLID 600 MG: 2 INJECTION, SOLUTION INTRAVENOUS at 05:09

## 2020-09-28 RX ADMIN — LINEZOLID 600 MG: 2 INJECTION, SOLUTION INTRAVENOUS at 06:09

## 2020-09-28 RX ADMIN — FAMOTIDINE 20 MG: 20 TABLET ORAL at 09:09

## 2020-09-28 RX ADMIN — ONDANSETRON 8 MG: 4 TABLET, ORALLY DISINTEGRATING ORAL at 09:09

## 2020-09-28 RX ADMIN — DONEPEZIL HYDROCHLORIDE 10 MG: 5 TABLET, FILM COATED ORAL at 10:09

## 2020-09-28 RX ADMIN — ASPIRIN 81 MG: 81 TABLET, COATED ORAL at 09:09

## 2020-09-28 RX ADMIN — CLOPIDOGREL 75 MG: 75 TABLET, FILM COATED ORAL at 09:09

## 2020-09-28 RX ADMIN — IPRATROPIUM BROMIDE AND ALBUTEROL SULFATE 3 ML: 2.5; .5 SOLUTION RESPIRATORY (INHALATION) at 11:09

## 2020-09-28 RX ADMIN — FAMOTIDINE 20 MG: 20 TABLET ORAL at 10:09

## 2020-09-28 NOTE — PROGRESS NOTES
[facility] Medicine  Progress Note    Patient Name: Martina Betancourt  MRN: [id number]  Patient Class: IP- Inpatient   Admission Date: 9/23/2020  Length of Stay: 5 days  Attending Physician: Joe Fuller III, MD  Primary Care Provider: Joe Fuller Iii, MD        Subjective:     Principal Problem:Hospital-acquired pneumonia        HPI:  Pt is a 69-year-old white female history of Alzheimer's disease, CAD, HTN, Obstructive Sleep Apnea, and obesity presents to the [facility] ED via EMS with SOB and lethargy.  Hx of this in the past - recently admitted and transferred to an outlying facility due to elevated troponins.  Pt is alert, will wake up when prompted, is talking in complete sentences. DNR per previous ER records and confirmed by EMS.    Overview/Hospital Course:  09/24/2020 SD: BCx2 positive for gram+ cocci in clusters resembling staph - treat with linezolid, zosyn, and duoneb. Continuous oxygen therapy with bipap qHS. Urine culture pending - empiric treatment with zosyn and cipro. Medical management of Alzheimer's, CAD, and HTN. GI proph. with famotidine. DVT proph. with SCDs  09/25/2020 SD: BCx2 positive for staphylococcus aureus, susceptibility pending - continue Linezolid and duoneb. Urine culture positive for gram negative john, identification and susceptibility pending - continue Zosyn and Cipro. Pt reports back pain - managed with pain medicine. States she is feeling better, requesting PT. No problems overnight.  09/26 : seen and examined, remains in pain and no acute evebnts reported, urine pos for klebsiella , will change abx to cipro and dc zosyn  09/27 : seen and examined, remains in pain and no acute evebnts reported, urine pos for klebsiella , afebrile   09/28/2020 SD: Continue antibiotic therapy. Pt c/o back pain - managed with pain medicine.    Interval History: See hospital course.    Review of Systems   Constitutional: Positive for activity change, appetite change and  fatigue. Negative for chills and fever.   HENT: Negative for ear pain, mouth sores, sinus pressure, sinus pain and sore throat.    Eyes: Negative for photophobia and visual disturbance.   Respiratory: Positive for cough. Negative for shortness of breath and wheezing.    Cardiovascular: Negative for chest pain, palpitations and leg swelling.   Gastrointestinal: Negative for abdominal pain, constipation, diarrhea, nausea and vomiting.   Endocrine: Negative for cold intolerance and heat intolerance.   Genitourinary: Negative for difficulty urinating.   Musculoskeletal: Positive for back pain and myalgias.   Skin: Positive for wound. Negative for rash.   Neurological: Positive for weakness. Negative for dizziness and light-headedness.     Objective:     Vital Signs (Most Recent):  Temp: 98.4 °F (36.9 °C) (09/28/20 1127)  Pulse: 77 (09/28/20 1127)  Resp: 20 (09/28/20 1127)  BP: (!) 163/70 (09/28/20 1127)  SpO2: (!) 90 % (09/28/20 1127) Vital Signs (24h Range):  Temp:  [97.6 °F (36.4 °C)-98.7 °F (37.1 °C)] 98.4 °F (36.9 °C)  Pulse:  [65-85] 77  Resp:  [18-22] 20  SpO2:  [90 %-97 %] 90 %  BP: (123-165)/(60-74) 163/70     Weight: (!) 139.7 kg (308 lb)  Body mass index is 41.77 kg/m².    Intake/Output Summary (Last 24 hours) at 9/28/2020 1323  Last data filed at 9/28/2020 0600  Gross per 24 hour   Intake 2480 ml   Output --   Net 2480 ml      Physical Exam  Vitals signs and nursing note reviewed.   Constitutional:       General: She is not in acute distress.     Appearance: She is obese.   HENT:      Head: Normocephalic and atraumatic.      Mouth/Throat:      Pharynx: Oropharynx is clear.   Eyes:      Extraocular Movements: Extraocular movements intact.   Neck:      Musculoskeletal: Normal range of motion and neck supple.   Cardiovascular:      Rate and Rhythm: Normal rate and regular rhythm.      Pulses: Normal pulses.      Heart sounds: Normal heart sounds.   Pulmonary:      Effort: No respiratory distress.      Breath  sounds: Normal breath sounds. No wheezing or rhonchi.      Comments: Wearing bipap.  Abdominal:      General: Bowel sounds are normal.      Palpations: Abdomen is soft.      Tenderness: There is no abdominal tenderness. There is no guarding.   Musculoskeletal:      Right lower leg: Edema (trace) present.      Left lower leg: Edema (trace) present.   Skin:     General: Skin is warm and dry.      Capillary Refill: Capillary refill takes less than 2 seconds.      Comments: Wound with dressing to right heel.   Neurological:      General: No focal deficit present.      Mental Status: She is oriented to person, place, and time.      Motor: Weakness present.   Psychiatric:         Mood and Affect: Mood normal.         Behavior: Behavior normal.         Thought Content: Thought content normal.         Judgment: Judgment normal.         Significant Labs:   CBC:   Recent Labs   Lab 09/27/20 0345 09/28/20  0545   WBC 11.06 10.42   HGB 10.0* 9.5*   HCT 34.0* 32.3*    347     CMP:   Recent Labs   Lab 09/27/20 0345 09/28/20  0545    138   K 3.4* 3.3*    102   CO2 31* 33*   GLU 94 116*   BUN 7* 6*   CREATININE 0.5 0.5   CALCIUM 9.2 9.6   PROT 6.5 6.6   ALBUMIN 1.6* 1.6*   BILITOT 0.4 0.3   ALKPHOS 103 96   AST 26 17   ALT 24 19   ANIONGAP 4* 3*   EGFRNONAA >60.0 >60.0     Magnesium:   Recent Labs   Lab 09/27/20 0345 09/28/20  0545   MG 1.9 2.0     All pertinent labs within the past 24 hours have been reviewed.    Significant Imaging: I have reviewed all pertinent imaging results/findings within the past 24 hours.      Assessment/Plan:      * Hospital-acquired pneumonia  BCx2 positive for staphylococcus aureus - continue Linezolid and duoneb treatments    Hypoxia  Continuous oxygen therapy with bipap qHS and PRN.      UTI (urinary tract infection)  Urine culture positive for klebsiella pneumoniae. Zosyn discontinued S/T sensitivity report. Continue Cipro.    Myopathy  - My patient Martina Betancourt (1950)  was last seen and evaluated by me on 09/25/2020. She has a diagnosis of pneumonia, hypoxia, UTI, and myopathy. This causes her to require an electric hospital bed due to an increased risk of aspiration unless the patient's head and upper body are elevated above 30 degrees. Pillows or wedges will not be efficient for this task. I believe an electric hospital bed will provide a safer environment for her by providing variable height feature to assist in transport from bed to wheelchair. As a result, Martina Betancourt can have a better quality of life. Please take into consideration my medical opinion when making a decision for my patient regarding the request for an electric hospital bed.  - Hospital bed approved.        VTE Risk Mitigation (From admission, onward)         Ordered     IP VTE HIGH RISK PATIENT  Once      09/23/20 1621     Place sequential compression device  Until discontinued      09/23/20 1621                Discharge Planning   BRIT:      Code Status: DNR   Is the patient medically ready for discharge?: No    Reason for patient still in hospital (select all that apply): Patient trending condition, Laboratory test and Treatment  Discharge Plan A: Home, Home Health                  Ramona Downey NP  Department of Hospital Medicine   [facility]

## 2020-09-28 NOTE — PLAN OF CARE
09/28/20 0909   Discharge Reassessment   Assessment Type Discharge Planning Reassessment   Anticipated Discharge Disposition Home-Health   Provided patient/caregiver education on the expected discharge date and the discharge plan Yes   Do you have any problems affording any of your prescribed medications? No   Discharge Plan A Home;Home Health   Discharge Plan B Home;Home Health   DME Needed Upon Discharge  hospital bed   Post-Acute Status   Post-Acute Authorization Home Health;HME   HME Status Set-up Complete   Home Health Status Set-up Complete     Patient to discharge home with services from [facility].  Hospital bed set-up w/Jenifer.  Contacted by Denisa Simmons stating only thing pending for delivery is date of patient's discharge.  Informed Denisa RAZO will notify her.

## 2020-09-28 NOTE — ASSESSMENT & PLAN NOTE
- My patient Martina Betancourt (1950) was last seen and evaluated by me on 09/25/2020. She has a diagnosis of pneumonia, hypoxia, UTI, and myopathy. This causes her to require an electric hospital bed due to an increased risk of aspiration unless the patient's head and upper body are elevated above 30 degrees. Pillows or wedges will not be efficient for this task. I believe an electric hospital bed will provide a safer environment for her by providing variable height feature to assist in transport from bed to wheelchair. As a result, Martina Betancourt can have a better quality of life. Please take into consideration my medical opinion when making a decision for my patient regarding the request for an electric hospital bed.  - Hospital bed approved.

## 2020-09-28 NOTE — PT/OT/SLP PROGRESS
"Physical Therapy Treatment    Patient Name:  Martina Betancourt   MRN:  [id number]    Recommendations:   Barriers to discharge: impaired functional mobility    Assessment:     Martina Betancourt is a 69 y.o. female admitted with a medical diagnosis of Hospital-acquired pneumonia.  She presents with the following impairments/functional limitations:    impaired functional strength, gait, transfers, balance, coordination, safety.    Rehab Prognosis: Fair; patient would benefit from acute skilled PT services to address these deficits and reach maximum level of function.    Recent Surgery: * No surgery found *      Plan:     During this hospitalization, patient to be seen  up to 12 x per week to address the identified rehab impairments via  therapeutic exercise and activities and progress toward the following goals:    · Plan of Care Expires:  10/02/20    Subjective     Chief Complaint: Patient reports LBP today. Patient requesting to sit up on side of bed.   Patient/Family Comments/goals: "I want to go home."  Pain/Comfort:  · Pain Rating 1: 8/10  · Location - Side 1: Bilateral  · Location - Orientation 1: lower  · Location 1: back  · Pain Addressed 1: Reposition, Nurse notified      Objective:     Communicated with nurse prior to session.  Patient found supine with peripheral IV upon PT entry to room.     General Precautions: Standard, fall, other (see comments)(sl\kin breakdown)     Functional Mobility:  · Bed Mobility:     · Rolling Left:  minimum assistance  · Rolling Right: minimum assistance  · Supine to Sit: moderate assistance  · Sit to Supine: moderate assistance      Therapeutic Activities and Exercises:   Patient sat on EOB with verbal and tactile cues to maintain proper postural alignment. Patient declined to perform LE exercises. Patient sat approximately 5 minutes then requested to lay back down.     Patient left supine with all lines intact and call button in reach..    GOALS:   Multidisciplinary Problems     " Physical Therapy Goals        Problem: Physical Therapy Goal    Goal Priority Disciplines Outcome Goal Variances Interventions   Physical Therapy Goal     PT, PT/OT Ongoing, Progressing                     Time Tracking:     PT Received On:    PT Start Time: 0925     PT Stop Time: 0935  PT Total Time (min): 10 min     Billable Minutes: Therapeutic Activity 10    Treatment Type: Treatment  PT/PTA: PT           Bria Felix, PT  09/28/2020

## 2020-09-28 NOTE — PLAN OF CARE
Pt voices back pain occasionally, but wants to remain on her back while in bed. Staff attempts to reposition and pt scoots back in supine position. No resp distress noted. Continue to observe.

## 2020-09-28 NOTE — ASSESSMENT & PLAN NOTE
Urine culture positive for klebsiella pneumoniae. Zosyn discontinued S/T sensitivity report. Continue Cipro.

## 2020-09-28 NOTE — SUBJECTIVE & OBJECTIVE
Interval History: See hospital course.    Review of Systems   Constitutional: Positive for activity change, appetite change and fatigue. Negative for chills and fever.   HENT: Negative for ear pain, mouth sores, sinus pressure, sinus pain and sore throat.    Eyes: Negative for photophobia and visual disturbance.   Respiratory: Positive for cough. Negative for shortness of breath and wheezing.    Cardiovascular: Negative for chest pain, palpitations and leg swelling.   Gastrointestinal: Negative for abdominal pain, constipation, diarrhea, nausea and vomiting.   Endocrine: Negative for cold intolerance and heat intolerance.   Genitourinary: Negative for difficulty urinating.   Musculoskeletal: Positive for back pain and myalgias.   Skin: Positive for wound. Negative for rash.   Neurological: Positive for weakness. Negative for dizziness and light-headedness.     Objective:     Vital Signs (Most Recent):  Temp: 98.4 °F (36.9 °C) (09/28/20 1127)  Pulse: 77 (09/28/20 1127)  Resp: 20 (09/28/20 1127)  BP: (!) 163/70 (09/28/20 1127)  SpO2: (!) 90 % (09/28/20 1127) Vital Signs (24h Range):  Temp:  [97.6 °F (36.4 °C)-98.7 °F (37.1 °C)] 98.4 °F (36.9 °C)  Pulse:  [65-85] 77  Resp:  [18-22] 20  SpO2:  [90 %-97 %] 90 %  BP: (123-165)/(60-74) 163/70     Weight: (!) 139.7 kg (308 lb)  Body mass index is 41.77 kg/m².    Intake/Output Summary (Last 24 hours) at 9/28/2020 1323  Last data filed at 9/28/2020 0600  Gross per 24 hour   Intake 2480 ml   Output --   Net 2480 ml      Physical Exam  Vitals signs and nursing note reviewed.   Constitutional:       General: She is not in acute distress.     Appearance: She is obese.   HENT:      Head: Normocephalic and atraumatic.      Mouth/Throat:      Pharynx: Oropharynx is clear.   Eyes:      Extraocular Movements: Extraocular movements intact.   Neck:      Musculoskeletal: Normal range of motion and neck supple.   Cardiovascular:      Rate and Rhythm: Normal rate and regular rhythm.       Pulses: Normal pulses.      Heart sounds: Normal heart sounds.   Pulmonary:      Effort: No respiratory distress.      Breath sounds: Normal breath sounds. No wheezing or rhonchi.      Comments: Wearing bipap.  Abdominal:      General: Bowel sounds are normal.      Palpations: Abdomen is soft.      Tenderness: There is no abdominal tenderness. There is no guarding.   Musculoskeletal:      Right lower leg: Edema (trace) present.      Left lower leg: Edema (trace) present.   Skin:     General: Skin is warm and dry.      Capillary Refill: Capillary refill takes less than 2 seconds.      Comments: Wound with dressing to right heel.   Neurological:      General: No focal deficit present.      Mental Status: She is oriented to person, place, and time.      Motor: Weakness present.   Psychiatric:         Mood and Affect: Mood normal.         Behavior: Behavior normal.         Thought Content: Thought content normal.         Judgment: Judgment normal.         Significant Labs:   CBC:   Recent Labs   Lab 09/27/20 0345 09/28/20  0545   WBC 11.06 10.42   HGB 10.0* 9.5*   HCT 34.0* 32.3*    347     CMP:   Recent Labs   Lab 09/27/20 0345 09/28/20  0545    138   K 3.4* 3.3*    102   CO2 31* 33*   GLU 94 116*   BUN 7* 6*   CREATININE 0.5 0.5   CALCIUM 9.2 9.6   PROT 6.5 6.6   ALBUMIN 1.6* 1.6*   BILITOT 0.4 0.3   ALKPHOS 103 96   AST 26 17   ALT 24 19   ANIONGAP 4* 3*   EGFRNONAA >60.0 >60.0     Magnesium:   Recent Labs   Lab 09/27/20 0345 09/28/20  0545   MG 1.9 2.0     All pertinent labs within the past 24 hours have been reviewed.    Significant Imaging: I have reviewed all pertinent imaging results/findings within the past 24 hours.

## 2020-09-28 NOTE — PT/OT/SLP PROGRESS
"Pt refused, reporting "I did my exercises already today."  Pt very lethargic. Nursing reported she had given her pain meds prior.  Will check back as appropriate.    Farheen Camacho, OTR/L  "

## 2020-09-29 PROBLEM — E87.6 HYPOKALEMIA: Status: ACTIVE | Noted: 2020-09-29

## 2020-09-29 LAB
ALBUMIN SERPL BCP-MCNC: 1.6 G/DL (ref 3.5–5.2)
ALP SERPL-CCNC: 90 U/L (ref 55–135)
ALT SERPL W/O P-5'-P-CCNC: 14 U/L (ref 10–44)
ANION GAP SERPL CALC-SCNC: 5 MMOL/L (ref 8–16)
AST SERPL-CCNC: 14 U/L (ref 10–40)
BASOPHILS # BLD AUTO: 0.02 K/UL (ref 0–0.2)
BASOPHILS NFR BLD: 0.2 % (ref 0–1.9)
BILIRUB SERPL-MCNC: 0.3 MG/DL (ref 0.1–1)
BUN SERPL-MCNC: 6 MG/DL (ref 8–23)
CALCIUM SERPL-MCNC: 9.6 MG/DL (ref 8.7–10.5)
CHLORIDE SERPL-SCNC: 101 MMOL/L (ref 95–110)
CO2 SERPL-SCNC: 33 MMOL/L (ref 23–29)
CREAT SERPL-MCNC: 0.4 MG/DL (ref 0.5–1.4)
DIFFERENTIAL METHOD: ABNORMAL
EOSINOPHIL # BLD AUTO: 0.1 K/UL (ref 0–0.5)
EOSINOPHIL NFR BLD: 0.4 % (ref 0–8)
ERYTHROCYTE [DISTWIDTH] IN BLOOD BY AUTOMATED COUNT: 15.8 % (ref 11.5–14.5)
EST. GFR  (AFRICAN AMERICAN): >60 ML/MIN/1.73 M^2
EST. GFR  (NON AFRICAN AMERICAN): >60 ML/MIN/1.73 M^2
GLUCOSE SERPL-MCNC: 89 MG/DL (ref 70–110)
HCT VFR BLD AUTO: 31.2 % (ref 37–48.5)
HGB BLD-MCNC: 9.3 G/DL (ref 12–16)
IMM GRANULOCYTES # BLD AUTO: 0.06 K/UL (ref 0–0.04)
IMM GRANULOCYTES NFR BLD AUTO: 0.5 % (ref 0–0.5)
LYMPHOCYTES # BLD AUTO: 1.5 K/UL (ref 1–4.8)
LYMPHOCYTES NFR BLD: 13.8 % (ref 18–48)
MAGNESIUM SERPL-MCNC: 1.8 MG/DL (ref 1.6–2.6)
MCH RBC QN AUTO: 27.4 PG (ref 27–31)
MCHC RBC AUTO-ENTMCNC: 29.8 G/DL (ref 32–36)
MCV RBC AUTO: 92 FL (ref 82–98)
MONOCYTES # BLD AUTO: 1.3 K/UL (ref 0.3–1)
MONOCYTES NFR BLD: 12 % (ref 4–15)
NEUTROPHILS # BLD AUTO: 8.1 K/UL (ref 1.8–7.7)
NEUTROPHILS NFR BLD: 73.1 % (ref 38–73)
NRBC BLD-RTO: 0 /100 WBC
PLATELET # BLD AUTO: 343 K/UL (ref 150–350)
PMV BLD AUTO: 9.2 FL (ref 9.2–12.9)
POTASSIUM SERPL-SCNC: 3.2 MMOL/L (ref 3.5–5.1)
PROT SERPL-MCNC: 6.3 G/DL (ref 6–8.4)
RBC # BLD AUTO: 3.39 M/UL (ref 4–5.4)
SODIUM SERPL-SCNC: 139 MMOL/L (ref 136–145)
WBC # BLD AUTO: 11.12 K/UL (ref 3.9–12.7)

## 2020-09-29 PROCEDURE — 94660 CPAP INITIATION&MGMT: CPT

## 2020-09-29 PROCEDURE — 27000221 HC OXYGEN, UP TO 24 HOURS

## 2020-09-29 PROCEDURE — 97530 THERAPEUTIC ACTIVITIES: CPT

## 2020-09-29 PROCEDURE — 25000242 PHARM REV CODE 250 ALT 637 W/ HCPCS: Performed by: INTERNAL MEDICINE

## 2020-09-29 PROCEDURE — 94761 N-INVAS EAR/PLS OXIMETRY MLT: CPT

## 2020-09-29 PROCEDURE — 94640 AIRWAY INHALATION TREATMENT: CPT

## 2020-09-29 PROCEDURE — 83735 ASSAY OF MAGNESIUM: CPT

## 2020-09-29 PROCEDURE — 11000001 HC ACUTE MED/SURG PRIVATE ROOM

## 2020-09-29 PROCEDURE — 36415 COLL VENOUS BLD VENIPUNCTURE: CPT

## 2020-09-29 PROCEDURE — 63600175 PHARM REV CODE 636 W HCPCS: Performed by: EMERGENCY MEDICINE

## 2020-09-29 PROCEDURE — 99900031 HC PATIENT EDUCATION (STAT)

## 2020-09-29 PROCEDURE — 80053 COMPREHEN METABOLIC PANEL: CPT

## 2020-09-29 PROCEDURE — 25000242 PHARM REV CODE 250 ALT 637 W/ HCPCS: Performed by: NURSE PRACTITIONER

## 2020-09-29 PROCEDURE — 25000003 PHARM REV CODE 250: Performed by: INTERNAL MEDICINE

## 2020-09-29 PROCEDURE — 25000003 PHARM REV CODE 250: Performed by: EMERGENCY MEDICINE

## 2020-09-29 PROCEDURE — 85025 COMPLETE CBC W/AUTO DIFF WBC: CPT

## 2020-09-29 PROCEDURE — 63600175 PHARM REV CODE 636 W HCPCS: Performed by: NURSE PRACTITIONER

## 2020-09-29 PROCEDURE — 99900035 HC TECH TIME PER 15 MIN (STAT)

## 2020-09-29 PROCEDURE — 25000003 PHARM REV CODE 250: Performed by: NURSE PRACTITIONER

## 2020-09-29 RX ORDER — IPRATROPIUM BROMIDE AND ALBUTEROL SULFATE 2.5; .5 MG/3ML; MG/3ML
3 SOLUTION RESPIRATORY (INHALATION)
Status: DISCONTINUED | OUTPATIENT
Start: 2020-09-29 | End: 2020-10-01 | Stop reason: HOSPADM

## 2020-09-29 RX ORDER — POTASSIUM CHLORIDE 20 MEQ/1
40 TABLET, EXTENDED RELEASE ORAL ONCE
Status: COMPLETED | OUTPATIENT
Start: 2020-09-29 | End: 2020-09-29

## 2020-09-29 RX ADMIN — DULOXETINE HYDROCHLORIDE 60 MG: 60 CAPSULE, DELAYED RELEASE ORAL at 08:09

## 2020-09-29 RX ADMIN — PRAVASTATIN SODIUM 40 MG: 40 TABLET ORAL at 08:09

## 2020-09-29 RX ADMIN — FAMOTIDINE 20 MG: 20 TABLET ORAL at 08:09

## 2020-09-29 RX ADMIN — IPRATROPIUM BROMIDE AND ALBUTEROL SULFATE 3 ML: 2.5; .5 SOLUTION RESPIRATORY (INHALATION) at 07:09

## 2020-09-29 RX ADMIN — LINEZOLID 600 MG: 2 INJECTION, SOLUTION INTRAVENOUS at 05:09

## 2020-09-29 RX ADMIN — HYDROCODONE BITARTRATE AND ACETAMINOPHEN 1 TABLET: 10; 325 TABLET ORAL at 08:09

## 2020-09-29 RX ADMIN — DONEPEZIL HYDROCHLORIDE 10 MG: 5 TABLET, FILM COATED ORAL at 08:09

## 2020-09-29 RX ADMIN — CIPROFLOXACIN 400 MG: 2 INJECTION, SOLUTION INTRAVENOUS at 03:09

## 2020-09-29 RX ADMIN — ISOSORBIDE MONONITRATE 60 MG: 30 TABLET, EXTENDED RELEASE ORAL at 08:09

## 2020-09-29 RX ADMIN — CIPROFLOXACIN 400 MG: 2 INJECTION, SOLUTION INTRAVENOUS at 04:09

## 2020-09-29 RX ADMIN — CLOPIDOGREL 75 MG: 75 TABLET, FILM COATED ORAL at 08:09

## 2020-09-29 RX ADMIN — IPRATROPIUM BROMIDE AND ALBUTEROL SULFATE 3 ML: 2.5; .5 SOLUTION RESPIRATORY (INHALATION) at 11:09

## 2020-09-29 RX ADMIN — LISINOPRIL 2.5 MG: 2.5 TABLET ORAL at 08:09

## 2020-09-29 RX ADMIN — IPRATROPIUM BROMIDE AND ALBUTEROL SULFATE 3 ML: 2.5; .5 SOLUTION RESPIRATORY (INHALATION) at 12:09

## 2020-09-29 RX ADMIN — METOPROLOL SUCCINATE 50 MG: 50 TABLET, EXTENDED RELEASE ORAL at 08:09

## 2020-09-29 RX ADMIN — POTASSIUM CHLORIDE 40 MEQ: 1500 TABLET, EXTENDED RELEASE ORAL at 11:09

## 2020-09-29 RX ADMIN — MEMANTINE HYDROCHLORIDE 28 MG: 7 CAPSULE, EXTENDED RELEASE ORAL at 08:09

## 2020-09-29 RX ADMIN — IPRATROPIUM BROMIDE AND ALBUTEROL SULFATE 3 ML: .5; 3 SOLUTION RESPIRATORY (INHALATION) at 07:09

## 2020-09-29 RX ADMIN — IPRATROPIUM BROMIDE AND ALBUTEROL SULFATE 3 ML: 2.5; .5 SOLUTION RESPIRATORY (INHALATION) at 04:09

## 2020-09-29 RX ADMIN — ASPIRIN 81 MG: 81 TABLET, COATED ORAL at 08:09

## 2020-09-29 RX ADMIN — SODIUM CHLORIDE: 0.9 INJECTION, SOLUTION INTRAVENOUS at 08:09

## 2020-09-29 NOTE — PROGRESS NOTES
[facility]  Adult Nutrition  Progress Note    SUMMARY       Recommendations    Recommendation/Intervention: Advanced diet as medically appropriate per MD. Add Cedrick BID.  Goals: Intake of 75%  Nutrition Goal Status: new    Reason for Assessment    Reason For Assessment: consult  Diagnosis: pulmonary disease    Nutrition Risk Screen    Nutrition Risk Screen: no indicators present    Nutrition/Diet History    Food Allergies: NKFA  Factors Affecting Nutritional Intake: impaired cognitive status/motor control, clear liquid diet    Anthropometrics    Temp: 98.2 °F (36.8 °C)  Height: 6' (182.9 cm)  Height (inches): 72 in  Weight Method: Bed Scale  Weight: (!) 139.7 kg (308 lb)  Weight (lb): (!) 308 lb  Ideal Body Weight (IBW), Female: 160 lb  % Ideal Body Weight, Female (lb): 192.5 %  BMI (Calculated): 41.8  BMI Grade: greater than 40 - morbid obesity       Lab/Procedures/Meds    Pertinent Labs Reviewed: reviewed  Pertinent Medications Reviewed: reviewed    Physical Findings/Assessment         Estimated/Assessed Needs    Weight Used For Calorie Calculations: 99 kg (218 lb 4.1 oz)( ibw)  Energy Calorie Requirements (kcal): 2475 kcal(25 kcal/kg aj ibw)  Energy Need Method: Kcal/kg  Protein Requirements: 128 gm(1.3 gm/kg  ibw)  Weight Used For Protein Calculations: 99 kg (218 lb 4.1 oz)(aj ibw)  Fluid Requirements (mL): 2475 ml  Estimated Fluid Requirement Method: RDA Method  RDA Method (mL): 2475         Nutrition Prescription Ordered    Current Diet Order: Clear liquid    Evaluation of Received Nutrient/Fluid Intake       % Intake of Estimated Energy Needs: 25 - 50 %  % Meal Intake: 50 - 75 %    Nutrition Risk    Level of Risk/Frequency of Follow-up: moderate - high     Assessment and Plan  68 y/o female admitted for hospital acquired pneumonia. PMHx includes HTN, CAD, alzheimer's, HLD, MI, and obesity.    RD consulted for wound. Wound is full thickness located on the right heel. Currently she is only  receiving clear liquids with poor intake. Will add Cedrick BID to diet order.     9/29- PO @ 60% on clear liquid diet. Pt is unable to obtain adequate nutrients through a clear liquid diet. Since her intake and mental status has improved, I will add Ensure clear to diet order to increase calorie and protein intake.     Nutrition Problem  Increased nutrient needs     Related to (etiology):   Wound healing     Signs and Symptoms (as evidenced by):   Full thickness wound to right heel      Interventions/Recommendations (treatment strategy):  Add Ensure Clear BID.  Advance diet as medically appropriate per MD.     Nutrition Diagnosis Status:   New     Monitor and Evaluation  Monitor intake, weight, and labs.     Nutrition Follow-Up    RD Follow-up?: Yes   Miles Ball

## 2020-09-29 NOTE — PT/OT/SLP PROGRESS
Attempted tx at 1520. Pt continues to be lethargic and unable to be aroused enough to participate in tx. Will continue with POC tomorrow.

## 2020-09-29 NOTE — PLAN OF CARE
Pt rested throughout shift, PO meds given with no problem, Pt slept with bipap on all night, noted extreme fatigue, pt awakens/responds to voice. IVF's infusing per MD orders, site patent.

## 2020-09-29 NOTE — PROGRESS NOTES
[facility] Medicine  Progress Note    Patient Name: Martina Betancourt  MRN: [id number]  Patient Class: IP- Inpatient   Admission Date: 9/23/2020  Length of Stay: 6 days  Attending Physician: Joe Fuller III, MD  Primary Care Provider: Joe Fuller Iii, MD        Subjective:     Principal Problem:Hospital-acquired pneumonia        HPI:  Pt is a 69-year-old white female history of Alzheimer's disease, CAD, HTN, Obstructive Sleep Apnea, and obesity presents to the [facility] ED via EMS with SOB and lethargy.  Hx of this in the past - recently admitted and transferred to an outlying facility due to elevated troponins.  Pt is alert, will wake up when prompted, is talking in complete sentences. DNR per previous ER records and confirmed by EMS.    Overview/Hospital Course:  09/24/2020 SD: BCx2 positive for gram+ cocci in clusters resembling staph - treat with linezolid, zosyn, and duoneb. Continuous oxygen therapy with bipap qHS. Urine culture pending - empiric treatment with zosyn and cipro. Medical management of Alzheimer's, CAD, and HTN. GI proph. with famotidine. DVT proph. with SCDs  09/25/2020 SD: BCx2 positive for staphylococcus aureus, susceptibility pending - continue Linezolid and duoneb. Urine culture positive for gram negative john, identification and susceptibility pending - continue Zosyn and Cipro. Pt reports back pain - managed with pain medicine. States she is feeling better, requesting PT. No problems overnight.  09/26 : seen and examined, remains in pain and no acute evebnts reported, urine pos for klebsiella , will change abx to cipro and dc zosyn  09/27 : seen and examined, remains in pain and no acute evebnts reported, urine pos for klebsiella , afebrile   09/28/2020 SD: Continue antibiotic therapy. Pt c/o back pain - managed with pain medicine.  09/29/2020 SD: Continue antibiotic therapy to complete minimum 7-day treatment. Continue bipap qHS and PRN. Replete potassium. Pt  reports back pain is manageable with pain medicine prescribed. No problems overnight.    Interval History: See hospital course.    Review of Systems   Constitutional: Positive for activity change, appetite change and fatigue. Negative for chills and fever.   HENT: Negative for ear pain, mouth sores, sinus pressure, sinus pain and sore throat.    Eyes: Negative for photophobia and visual disturbance.   Respiratory: Positive for cough. Negative for shortness of breath and wheezing.    Cardiovascular: Negative for chest pain, palpitations and leg swelling.   Gastrointestinal: Negative for abdominal pain, constipation, diarrhea, nausea and vomiting.   Endocrine: Negative for cold intolerance and heat intolerance.   Genitourinary: Negative for difficulty urinating.   Musculoskeletal: Positive for back pain and myalgias.   Skin: Positive for wound. Negative for rash.   Neurological: Positive for weakness. Negative for dizziness and light-headedness.     Objective:     Vital Signs (Most Recent):  Temp: 98.6 °F (37 °C) (09/29/20 0726)  Pulse: 68 (09/29/20 0726)  Resp: 18 (09/29/20 0818)  BP: (!) 165/66 (09/29/20 0726)  SpO2: 96 % (09/29/20 0726) Vital Signs (24h Range):  Temp:  [98.3 °F (36.8 °C)-99 °F (37.2 °C)] 98.6 °F (37 °C)  Pulse:  [64-85] 68  Resp:  [18-22] 18  SpO2:  [88 %-97 %] 96 %  BP: (140-165)/(61-74) 165/66     Weight: (!) 139.7 kg (308 lb)  Body mass index is 41.77 kg/m².    Intake/Output Summary (Last 24 hours) at 9/29/2020 1033  Last data filed at 9/29/2020 0600  Gross per 24 hour   Intake 4853 ml   Output --   Net 4853 ml      Physical Exam  Vitals signs and nursing note reviewed.   Constitutional:       General: She is not in acute distress.     Appearance: She is obese.   HENT:      Head: Normocephalic and atraumatic.      Mouth/Throat:      Pharynx: Oropharynx is clear.   Eyes:      Extraocular Movements: Extraocular movements intact.   Neck:      Musculoskeletal: Normal range of motion and neck supple.    Cardiovascular:      Rate and Rhythm: Normal rate and regular rhythm.      Pulses: Normal pulses.      Heart sounds: Normal heart sounds.   Pulmonary:      Effort: No respiratory distress.      Breath sounds: Normal breath sounds. No wheezing or rhonchi.      Comments: Wearing bipap.  Abdominal:      General: Bowel sounds are normal.      Palpations: Abdomen is soft.      Tenderness: There is no abdominal tenderness. There is no guarding.   Musculoskeletal:      Right lower leg: Edema (trace) present.      Left lower leg: Edema (trace) present.   Skin:     General: Skin is warm and dry.      Capillary Refill: Capillary refill takes less than 2 seconds.      Comments: Wound with dressing to right heel.   Neurological:      General: No focal deficit present.      Mental Status: She is oriented to person, place, and time.      Motor: Weakness present.   Psychiatric:         Mood and Affect: Mood normal.         Behavior: Behavior normal.         Thought Content: Thought content normal.         Judgment: Judgment normal.         Significant Labs:   CBC:   Recent Labs   Lab 09/28/20  0545 09/29/20 0317   WBC 10.42 11.12   HGB 9.5* 9.3*   HCT 32.3* 31.2*    343     CMP:   Recent Labs   Lab 09/28/20  0545 09/29/20 0317    139   K 3.3* 3.2*    101   CO2 33* 33*   * 89   BUN 6* 6*   CREATININE 0.5 0.4*   CALCIUM 9.6 9.6   PROT 6.6 6.3   ALBUMIN 1.6* 1.6*   BILITOT 0.3 0.3   ALKPHOS 96 90   AST 17 14   ALT 19 14   ANIONGAP 3* 5*   EGFRNONAA >60.0 >60.0     Magnesium:   Recent Labs   Lab 09/28/20  0545 09/29/20 0317   MG 2.0 1.8     All pertinent labs within the past 24 hours have been reviewed.    Significant Imaging: I have reviewed all pertinent imaging results/findings within the past 24 hours.      Assessment/Plan:      * Hospital-acquired pneumonia  BCx2 positive for staphylococcus aureus - continue Linezolid and duoneb treatments    Hypoxia  Continuous oxygen therapy with bipap qHS and  PRN.      UTI (urinary tract infection)  Urine culture positive for klebsiella pneumoniae. Zosyn discontinued S/T sensitivity report. Continue Cipro.    Myopathy  - My patient Martina Betancourt (1950) was last seen and evaluated by me on 09/25/2020. She has a diagnosis of pneumonia, hypoxia, UTI, and myopathy. This causes her to require an electric hospital bed due to an increased risk of aspiration unless the patient's head and upper body are elevated above 30 degrees. Pillows or wedges will not be efficient for this task. I believe an electric hospital bed will provide a safer environment for her by providing variable height feature to assist in transport from bed to wheelchair. As a result, Martina Betancourt can have a better quality of life. Please take into consideration my medical opinion when making a decision for my patient regarding the request for an electric hospital bed.  - Hospital bed approved.      Hypokalemia  Replete and monitor.        VTE Risk Mitigation (From admission, onward)         Ordered     IP VTE HIGH RISK PATIENT  Once      09/23/20 1621     Place sequential compression device  Until discontinued      09/23/20 1621                Discharge Planning   BRIT:      Code Status: DNR   Is the patient medically ready for discharge?: No    Reason for patient still in hospital (select all that apply): Patient trending condition, Laboratory test and Treatment  Discharge Plan A: Home, Home Health                  Ramona Downey NP  Department of Hospital Medicine   [facility]

## 2020-09-29 NOTE — PROGRESS NOTES
Physical Therapy  Treatment    Martina Betancourt   MRN: [id number]   Admitting Diagnosis: Hospital-acquired pneumonia       Start time: 1015  Stop time: 1045      Billable Minutes:  Therapeutic Activity 30 minutes                     General Precautions: Standard, fall, other (see comments)(sl\kin breakdown)  Contact for MRSA in blood       Subjective:  Communicated with nurse/CNA prior to session.  Pt was noted to be soiled in urine during mobilization; CNA in room to assist in extensive cleaning.         Objective:       Pt found to be very lethargic. Attempted rolling to sit up in order to increase level of alertness and found pt very soiled in urine, through sheets. CNA to room and assisted in cleaning/changing pt. Note that pt remained very groggy throughout process.    Functional Mobility:  Bed Mobility:    -Rolling L/R x 3-4 trials for cleaning, changing sheets total assist x 2  -scooting up in bed total assist x 3    Transfers:   NA -pt unable to participate well enough today        Balance: NA, pt too groggy        Therapeutic Activities and Exercises:  PROM of B SOSA's, pt unable to participate. Bed mobility as above.       Patient left HOB elevated with call button in reach and chair alarm off.    Assessment:  Martina Betancourt is a 69 y.o. female with a medical diagnosis of Hospital-acquired pneumonia and presents with increased lethargy today; inability to participate with urinary incontinence.    Rehab identified problem list/impairments:      Rehab potential is fair.    Activity tolerance: Poor today    Discharge recommendations:   continued PT    Barriers to discharge:      Equipment recommendations:       GOALS:   Multidisciplinary Problems     Physical Therapy Goals        Problem: Physical Therapy Goal    Goal Priority Disciplines Outcome Goal Variances Interventions   Physical Therapy Goal     PT, PT/OT Ongoing, Progressing                     PLAN:    Continue POC       Brielle Hylton  PT  09/29/2020

## 2020-09-29 NOTE — PT/OT/SLP PROGRESS
Attempted OT tx at 0930. Pt very lethargic, unable to maintain wakeful state despite maximum verbal and tactile cues. Pt unsafe to attempt to sit up at this time and unable to participate in tx. Will attempt later in the day.

## 2020-09-29 NOTE — SUBJECTIVE & OBJECTIVE
Interval History: See hospital course.    Review of Systems   Constitutional: Positive for activity change, appetite change and fatigue. Negative for chills and fever.   HENT: Negative for ear pain, mouth sores, sinus pressure, sinus pain and sore throat.    Eyes: Negative for photophobia and visual disturbance.   Respiratory: Positive for cough. Negative for shortness of breath and wheezing.    Cardiovascular: Negative for chest pain, palpitations and leg swelling.   Gastrointestinal: Negative for abdominal pain, constipation, diarrhea, nausea and vomiting.   Endocrine: Negative for cold intolerance and heat intolerance.   Genitourinary: Negative for difficulty urinating.   Musculoskeletal: Positive for back pain and myalgias.   Skin: Positive for wound. Negative for rash.   Neurological: Positive for weakness. Negative for dizziness and light-headedness.     Objective:     Vital Signs (Most Recent):  Temp: 98.6 °F (37 °C) (09/29/20 0726)  Pulse: 68 (09/29/20 0726)  Resp: 18 (09/29/20 0818)  BP: (!) 165/66 (09/29/20 0726)  SpO2: 96 % (09/29/20 0726) Vital Signs (24h Range):  Temp:  [98.3 °F (36.8 °C)-99 °F (37.2 °C)] 98.6 °F (37 °C)  Pulse:  [64-85] 68  Resp:  [18-22] 18  SpO2:  [88 %-97 %] 96 %  BP: (140-165)/(61-74) 165/66     Weight: (!) 139.7 kg (308 lb)  Body mass index is 41.77 kg/m².    Intake/Output Summary (Last 24 hours) at 9/29/2020 1033  Last data filed at 9/29/2020 0600  Gross per 24 hour   Intake 4853 ml   Output --   Net 4853 ml      Physical Exam  Vitals signs and nursing note reviewed.   Constitutional:       General: She is not in acute distress.     Appearance: She is obese.   HENT:      Head: Normocephalic and atraumatic.      Mouth/Throat:      Pharynx: Oropharynx is clear.   Eyes:      Extraocular Movements: Extraocular movements intact.   Neck:      Musculoskeletal: Normal range of motion and neck supple.   Cardiovascular:      Rate and Rhythm: Normal rate and regular rhythm.      Pulses:  Normal pulses.      Heart sounds: Normal heart sounds.   Pulmonary:      Effort: No respiratory distress.      Breath sounds: Normal breath sounds. No wheezing or rhonchi.      Comments: Wearing bipap.  Abdominal:      General: Bowel sounds are normal.      Palpations: Abdomen is soft.      Tenderness: There is no abdominal tenderness. There is no guarding.   Musculoskeletal:      Right lower leg: Edema (trace) present.      Left lower leg: Edema (trace) present.   Skin:     General: Skin is warm and dry.      Capillary Refill: Capillary refill takes less than 2 seconds.      Comments: Wound with dressing to right heel.   Neurological:      General: No focal deficit present.      Mental Status: She is oriented to person, place, and time.      Motor: Weakness present.   Psychiatric:         Mood and Affect: Mood normal.         Behavior: Behavior normal.         Thought Content: Thought content normal.         Judgment: Judgment normal.         Significant Labs:   CBC:   Recent Labs   Lab 09/28/20 0545 09/29/20 0317   WBC 10.42 11.12   HGB 9.5* 9.3*   HCT 32.3* 31.2*    343     CMP:   Recent Labs   Lab 09/28/20 0545 09/29/20 0317    139   K 3.3* 3.2*    101   CO2 33* 33*   * 89   BUN 6* 6*   CREATININE 0.5 0.4*   CALCIUM 9.6 9.6   PROT 6.6 6.3   ALBUMIN 1.6* 1.6*   BILITOT 0.3 0.3   ALKPHOS 96 90   AST 17 14   ALT 19 14   ANIONGAP 3* 5*   EGFRNONAA >60.0 >60.0     Magnesium:   Recent Labs   Lab 09/28/20 0545 09/29/20 0317   MG 2.0 1.8     All pertinent labs within the past 24 hours have been reviewed.    Significant Imaging: I have reviewed all pertinent imaging results/findings within the past 24 hours.

## 2020-09-29 NOTE — NURSING
Patient is resting with HOB elevated.Continues on 4L of oxygen nc. Tolerates medications well.No distress noted during the shift.Afebrile, no adverse reactions noted due to abx therapy.Tolerating breathing treatments well. Bed in lowest position,call light within reach.[email or web address]

## 2020-09-30 LAB
ALBUMIN SERPL BCP-MCNC: 1.6 G/DL (ref 3.5–5.2)
ALP SERPL-CCNC: 79 U/L (ref 55–135)
ALT SERPL W/O P-5'-P-CCNC: 14 U/L (ref 10–44)
ANION GAP SERPL CALC-SCNC: 3 MMOL/L (ref 8–16)
AST SERPL-CCNC: 11 U/L (ref 10–40)
BASOPHILS # BLD AUTO: 0.03 K/UL (ref 0–0.2)
BASOPHILS NFR BLD: 0.3 % (ref 0–1.9)
BILIRUB SERPL-MCNC: 0.3 MG/DL (ref 0.1–1)
BUN SERPL-MCNC: 8 MG/DL (ref 8–23)
CALCIUM SERPL-MCNC: 9.3 MG/DL (ref 8.7–10.5)
CHLORIDE SERPL-SCNC: 104 MMOL/L (ref 95–110)
CO2 SERPL-SCNC: 35 MMOL/L (ref 23–29)
CREAT SERPL-MCNC: 0.4 MG/DL (ref 0.5–1.4)
DIFFERENTIAL METHOD: ABNORMAL
EOSINOPHIL # BLD AUTO: 0.2 K/UL (ref 0–0.5)
EOSINOPHIL NFR BLD: 1.8 % (ref 0–8)
ERYTHROCYTE [DISTWIDTH] IN BLOOD BY AUTOMATED COUNT: 16 % (ref 11.5–14.5)
EST. GFR  (AFRICAN AMERICAN): >60 ML/MIN/1.73 M^2
EST. GFR  (NON AFRICAN AMERICAN): >60 ML/MIN/1.73 M^2
GLUCOSE SERPL-MCNC: 81 MG/DL (ref 70–110)
HCT VFR BLD AUTO: 30.2 % (ref 37–48.5)
HGB BLD-MCNC: 9 G/DL (ref 12–16)
IMM GRANULOCYTES # BLD AUTO: 0.04 K/UL (ref 0–0.04)
IMM GRANULOCYTES NFR BLD AUTO: 0.4 % (ref 0–0.5)
LYMPHOCYTES # BLD AUTO: 1.8 K/UL (ref 1–4.8)
LYMPHOCYTES NFR BLD: 18.7 % (ref 18–48)
MAGNESIUM SERPL-MCNC: 1.9 MG/DL (ref 1.6–2.6)
MCH RBC QN AUTO: 27.6 PG (ref 27–31)
MCHC RBC AUTO-ENTMCNC: 29.8 G/DL (ref 32–36)
MCV RBC AUTO: 93 FL (ref 82–98)
MONOCYTES # BLD AUTO: 1.2 K/UL (ref 0.3–1)
MONOCYTES NFR BLD: 12.7 % (ref 4–15)
NEUTROPHILS # BLD AUTO: 6.4 K/UL (ref 1.8–7.7)
NEUTROPHILS NFR BLD: 66.1 % (ref 38–73)
NRBC BLD-RTO: 0 /100 WBC
PLATELET # BLD AUTO: 397 K/UL (ref 150–350)
PMV BLD AUTO: 9.5 FL (ref 9.2–12.9)
POTASSIUM SERPL-SCNC: 3.7 MMOL/L (ref 3.5–5.1)
PROT SERPL-MCNC: 6 G/DL (ref 6–8.4)
RBC # BLD AUTO: 3.26 M/UL (ref 4–5.4)
SODIUM SERPL-SCNC: 142 MMOL/L (ref 136–145)
WBC # BLD AUTO: 9.73 K/UL (ref 3.9–12.7)

## 2020-09-30 PROCEDURE — 25000003 PHARM REV CODE 250: Performed by: INTERNAL MEDICINE

## 2020-09-30 PROCEDURE — 63600175 PHARM REV CODE 636 W HCPCS: Performed by: EMERGENCY MEDICINE

## 2020-09-30 PROCEDURE — 94660 CPAP INITIATION&MGMT: CPT

## 2020-09-30 PROCEDURE — 94761 N-INVAS EAR/PLS OXIMETRY MLT: CPT

## 2020-09-30 PROCEDURE — 99900031 HC PATIENT EDUCATION (STAT)

## 2020-09-30 PROCEDURE — 25000003 PHARM REV CODE 250: Performed by: EMERGENCY MEDICINE

## 2020-09-30 PROCEDURE — 99900035 HC TECH TIME PER 15 MIN (STAT)

## 2020-09-30 PROCEDURE — 97530 THERAPEUTIC ACTIVITIES: CPT

## 2020-09-30 PROCEDURE — 25000003 PHARM REV CODE 250: Performed by: NURSE PRACTITIONER

## 2020-09-30 PROCEDURE — 97110 THERAPEUTIC EXERCISES: CPT

## 2020-09-30 PROCEDURE — 36415 COLL VENOUS BLD VENIPUNCTURE: CPT

## 2020-09-30 PROCEDURE — 27000221 HC OXYGEN, UP TO 24 HOURS

## 2020-09-30 PROCEDURE — 80053 COMPREHEN METABOLIC PANEL: CPT

## 2020-09-30 PROCEDURE — 85025 COMPLETE CBC W/AUTO DIFF WBC: CPT

## 2020-09-30 PROCEDURE — 94640 AIRWAY INHALATION TREATMENT: CPT

## 2020-09-30 PROCEDURE — 83735 ASSAY OF MAGNESIUM: CPT

## 2020-09-30 PROCEDURE — 63600175 PHARM REV CODE 636 W HCPCS: Performed by: NURSE PRACTITIONER

## 2020-09-30 PROCEDURE — 11000001 HC ACUTE MED/SURG PRIVATE ROOM

## 2020-09-30 PROCEDURE — 25000242 PHARM REV CODE 250 ALT 637 W/ HCPCS: Performed by: INTERNAL MEDICINE

## 2020-09-30 RX ADMIN — LINEZOLID 600 MG: 2 INJECTION, SOLUTION INTRAVENOUS at 05:09

## 2020-09-30 RX ADMIN — IPRATROPIUM BROMIDE AND ALBUTEROL SULFATE 3 ML: .5; 3 SOLUTION RESPIRATORY (INHALATION) at 11:09

## 2020-09-30 RX ADMIN — FAMOTIDINE 20 MG: 20 TABLET ORAL at 09:09

## 2020-09-30 RX ADMIN — ISOSORBIDE MONONITRATE 60 MG: 30 TABLET, EXTENDED RELEASE ORAL at 09:09

## 2020-09-30 RX ADMIN — CLOPIDOGREL 75 MG: 75 TABLET, FILM COATED ORAL at 09:09

## 2020-09-30 RX ADMIN — METOPROLOL SUCCINATE 50 MG: 50 TABLET, EXTENDED RELEASE ORAL at 09:09

## 2020-09-30 RX ADMIN — IPRATROPIUM BROMIDE AND ALBUTEROL SULFATE 3 ML: .5; 3 SOLUTION RESPIRATORY (INHALATION) at 07:09

## 2020-09-30 RX ADMIN — PRAVASTATIN SODIUM 40 MG: 40 TABLET ORAL at 09:09

## 2020-09-30 RX ADMIN — ASPIRIN 81 MG: 81 TABLET, COATED ORAL at 09:09

## 2020-09-30 RX ADMIN — CIPROFLOXACIN 400 MG: 2 INJECTION, SOLUTION INTRAVENOUS at 03:09

## 2020-09-30 RX ADMIN — HYDROCODONE BITARTRATE AND ACETAMINOPHEN 1 TABLET: 10; 325 TABLET ORAL at 12:09

## 2020-09-30 RX ADMIN — DONEPEZIL HYDROCHLORIDE 10 MG: 5 TABLET, FILM COATED ORAL at 09:09

## 2020-09-30 RX ADMIN — LINEZOLID 600 MG: 2 INJECTION, SOLUTION INTRAVENOUS at 06:09

## 2020-09-30 RX ADMIN — HYDROCODONE BITARTRATE AND ACETAMINOPHEN 1 TABLET: 10; 325 TABLET ORAL at 02:09

## 2020-09-30 RX ADMIN — IPRATROPIUM BROMIDE AND ALBUTEROL SULFATE 3 ML: .5; 3 SOLUTION RESPIRATORY (INHALATION) at 03:09

## 2020-09-30 RX ADMIN — DULOXETINE HYDROCHLORIDE 60 MG: 60 CAPSULE, DELAYED RELEASE ORAL at 09:09

## 2020-09-30 RX ADMIN — HYDROCODONE BITARTRATE AND ACETAMINOPHEN 1 TABLET: 10; 325 TABLET ORAL at 09:09

## 2020-09-30 RX ADMIN — MEMANTINE HYDROCHLORIDE 28 MG: 7 CAPSULE, EXTENDED RELEASE ORAL at 09:09

## 2020-09-30 RX ADMIN — CIPROFLOXACIN 400 MG: 2 INJECTION, SOLUTION INTRAVENOUS at 05:09

## 2020-09-30 RX ADMIN — LISINOPRIL 2.5 MG: 2.5 TABLET ORAL at 09:09

## 2020-09-30 NOTE — PT/OT/SLP PROGRESS
Occupational Therapy   Treatment    Name: Martina Betancourt  MRN: [id number]  Admitting Diagnosis:  Hospital-acquired pneumonia       Recommendations:     Discharge Recommendations: nursing facility, skilled  Discharge Equipment Recommendations:   TBD  Barriers to discharge:   Medical status    Assessment:     Martina Betancourt is a 69 y.o. female with a medical diagnosis of Hospital-acquired pneumonia.  She presents with decreased functional Ind with ADLs and functional mobility. Performance deficits affecting function are  BUE strength, endurance, coordination, balance, and safety.     Rehab Prognosis:  Fair; patient would benefit from acute skilled OT services to address these deficits and reach maximum level of function.       Plan:     Patient to be seen 6 x/week to address the above listed problems via self-care/home management, therapeutic activities, therapeutic exercises  · Plan of Care Expires: 09/30/20  · Plan of Care Reviewed with: patient, daughter    Subjective     Pain/Comfort:  ·  0/10    Objective:     Communicated with: nursing prior to session.  Patient found supine with   NCO2; null catheter, IV, upon OT entry to room.    General Precautions: Standard, fall, other (see comments)(skin breakdown)     Treatment & Education:  Pt supine in bed upon OT entry into room. Pt states she recently finished PT and was too fatigued to sit upright at EOB. She is agreeable to perform bed mobility and weight shifting training to allow her to relieve pressure and prevent skin breakdown. OT instructed pt in strategies to perform bridging and rolling in bed to prevent skin breakdown, as well as proper hygiene and use of pillows to float heels and elevate legs. Pt verbalizes understanding of all education, but requires Total A to execute due to functional level. Pt much more alert today and able to perform BUE AROM exercises all joints through all planes 3x10 reps to improve strength required for ADLs and functional  mobility. Pt requires rest breaks and eventually began with SOB and heavy breathing. Session ended to allow pt to recover. Pt tolerated tx well without complaints.     Patient left HOB elevated with all lines intact, call button in reach, nurse notified and pt given room phone to call daughterEducation:      GOALS:   Multidisciplinary Problems     Occupational Therapy Goals        Problem: Occupational Therapy Goal    Goal Priority Disciplines Outcome Interventions   Occupational Therapy Goal     OT, PT/OT Ongoing, Progressing    Description: 1.  Pt will sit EOB for 5 minutes and complete grooming activity with set-up assistance.   2.  Pt will complete feeding with MOD I.  3.  Pt will complete BUE exercises as tolerated with no SOB.    4.  Pt will improve supine to sit with MIN A.                     Time Tracking:     OT Date of Treatment:   09/30/2020  OT Start Time:  1430  OT Stop Time:  1454  OT Total Time (min):  24 minutes    Billable Minutes:Therapeutic Activity 16 minutes  Therapeutic Exercise 8 minutes    Rio Martinez OT  9/30/2020

## 2020-09-30 NOTE — PLAN OF CARE
Contacted by patient's daughter requesting trapeze for hospital bed at home.  CM contacted JOYCE Shahid to obtain orders.  Orders entered as indicated and faxed referral to [facility] as they are provided hospital bed as well.

## 2020-09-30 NOTE — PHYSICIAN QUERY
PT Name: Martina Betancourt  MR #: [id number]     SYSTEMIC INFECTIOUS PROCESS CLARIFICATION     CDS/: Charley Reyes RN             Contact information: [email or web address]  This form is a permanent document in the medical record.     Query Date: September 30, 2020    By submitting this query, we are merely seeking further clarification of documentation.  Please utilize your independent clinical judgment when addressing the question(s) below.  The Medical Record contains the following:  Indicators Supporting Clinical Findings Location in Medical Record   x HR         RR          BP        Temp Temp:  [97.6 °F (36.4 °C)-101.1 °F   Pulse:  [55-86]   Resp:  [19-30]   SpO2:  [78 %-99 %]   BP: (107-158)/(51-79)  H & P of 9/24   x Lactic Acid          Procalcitonin   Lactate, Rakesh 2.0      Lactate of 9/23   x WBC           Bands          CRP    WBC 20.54 (H) 20.18 (H) 15.61 (H) 11.06 10.42 11.12      WBC of 9/25 to 9/29   x Culture(s) METHICILLIN RESISTANT STAPHYLOCOCCUS AUREUS     KLEBSIELLA PNEUMONIAE   >100,000 cfu/ml      Blood cultures of 9/23      Urine cultures of 9/23   x AMS, Confusion, LOC, etc. Mild altered mental status         ED Provider Note of 9/23    Organ Dysfunction/Failure      Bacteremia or Sepsis / Septic     x Known or Suspected Source of Infection documented Hospital-acquired pneumonia    UTI   HM H & P  of 9/24     H & P of 9/24    (Failed) Outpatient Treatment     x Medication Ciprofloxacin 400mg IV every 12 hours     Linezolid 600 mg IV every 8 hours     Piperacillin 4.5mg IV every 8 hours     Piperacillin 4.5 mg IV ED times one     NACL infusion IV @ 100cc/hr     MAR 9/24 to 10/1    MAR 9/23 to present    MAR 9/23 to 9/26    MAR 9/23    MAR 9/23 to present    Treatment      Other          Provider, please specify diagnosis or diagnoses associated with above clinical findings: Select all that apply  [   ] Sepsis due to MRSA   [   } Sepsis due to Klebsiella Pneumoniae   [   ] Severe  Sepsis with Acute Organ Dysfunction/Failure (please specify organ dysfunction/failure): _______   [ X ] Bacteremia without Sepsis   [   ] Bacteremia with Sepsis   [   ] SIRS with infection but without Sepsis   [   ] Other Infectious Disease (please specify): __________   [   ] Sepsis Ruled Out   [  ] Clinically Undetermined         Please document in your progress notes daily for the duration of treatment until resolved and include in your discharge summary.

## 2020-09-30 NOTE — PHYSICIAN QUERY
PT Name: Martina Betancourt  MR #: [id number]     PHYSICIAN QUERY -  INTEGUMENTARY CLARIFICATION     CDS/: Charley Reyes RN            Contact information: [email or web address]  This form is a permanent document in the medical record.     Query Date: September 30, 2020    By submitting this query, we are merely seeking further clarification of documentation.  Please utilize your independent clinical judgment when addressing the question(s) below.  The Medical Record contains the following:   Indicators   Supporting Clinical Findings Location in Medical Record    Redness     x Decubitus, Pressure, Ulcer, etc. Pressure ulcer to right heel    ED Provider Note of 9/23    Deep Tissue Injury     x Wound care consult Altered Skin Integrity Present on Admission: yes  Side: Right  Orientation: posterior  Location: Heel  Description of Altered Skin Integrity: Full thickness tissue loss. Base is covered by sloug...  Full thickness tissue loss. Base is covered by slough and/or eschar in the wound bed  (Medical device pressure-related injury) Wound Care consult of 9/25    Medication:     x Treatment: Cleansed with:;Wound cleanser;Applied:;Skin Barrier  Changed;Foam;Other (see comments)  (Opticell Ag+ applied to wound bed) Wound Care Consult of 9/25   x Other:          National Pressure Ulcer Advisory Panel (2007) Pressure Ulcer Definitions & Stages:  Stage I:                     Intact skin with non-blanchable redness of a localized area usually over a bony prominence.   Stage II:                    Partial thickness loss of dermis presenting as a shallow open ulcer with a red pink wound bed, without slough.   Stage III:                   Full thickness tissue loss. Subcutaneous fat may be visible but bone, tendon or muscle is not exposed. Slough may be                                      present but does not obscure the depth of tissue loss. May include undermining and tunneling.  Stage IV:                  Full thickness  tissue loss with exposed bone, tendon or muscle. Slough or eschar may be present on some parts of the                                      wound bed. Often include undermining and tunneling.  Unstageable:           Full thickness tissue loss but base of ulcer is covered by slough and/or eschar in the wound bed. Until enough                                        slough and/or eschar is removed to expose wound base, its true depth, and therefore stage, cannot be determined  Deep Tissue Injury: Purple or maroon localized area of discolored intact skin or blood-filled blister due to damage of underlying soft tissue   from pressure and/or shear.  Suspected deep tissue injuries may develop into a stageable ulcer or turn out to be another diagnosis (e.g. an ecchymosis)     Provider, please specify the stage of the Pressure ulcer to right heel     SITE LATERALITY STAGE   [X] heel [X]  right       [  ]  left [  ] 1         [  ] 2  [X] 3         [  ] 4  [  ]Unstageable  [  ] DTI   [  ] other site (please specify): ______ [  ]  right       [  ]  left [  ] 1         [  ] 2  [  ] 3         [  ] 4  [  ]Unstageable  [  ] DTI       [  ] Clinically undetermined         Please document in your progress notes daily for the duration of treatment until resolved and include in your discharge summary.

## 2020-09-30 NOTE — SUBJECTIVE & OBJECTIVE
Interval History: See hospital course.    Review of Systems   Constitutional: Positive for activity change and fatigue. Negative for appetite change, chills and fever.   HENT: Negative for ear pain, mouth sores, sinus pressure, sinus pain and sore throat.    Eyes: Negative for photophobia and visual disturbance.   Respiratory: Positive for cough. Negative for shortness of breath and wheezing.    Cardiovascular: Negative for chest pain, palpitations and leg swelling.   Gastrointestinal: Negative for abdominal pain, constipation, diarrhea, nausea and vomiting.   Endocrine: Negative for cold intolerance and heat intolerance.   Genitourinary: Negative for difficulty urinating.   Musculoskeletal: Positive for back pain and myalgias.   Skin: Positive for wound. Negative for rash.   Neurological: Positive for weakness. Negative for dizziness and light-headedness.     Objective:     Vital Signs (Most Recent):  Temp: 98.5 °F (36.9 °C) (09/30/20 0705)  Pulse: 65 (09/30/20 0751)  Resp: 20 (09/30/20 0751)  BP: (!) 141/66 (09/30/20 0705)  SpO2: 96 % (09/30/20 0751) Vital Signs (24h Range):  Temp:  [97.6 °F (36.4 °C)-98.5 °F (36.9 °C)] 98.5 °F (36.9 °C)  Pulse:  [57-66] 65  Resp:  [18-22] 20  SpO2:  [92 %-98 %] 96 %  BP: (132-147)/(63-71) 141/66     Weight: (!) 139.7 kg (308 lb)  Body mass index is 41.77 kg/m².    Intake/Output Summary (Last 24 hours) at 9/30/2020 0911  Last data filed at 9/30/2020 0600  Gross per 24 hour   Intake 1600 ml   Output 2807 ml   Net -1207 ml      Physical Exam  Vitals signs and nursing note reviewed.   Constitutional:       General: She is not in acute distress.     Appearance: She is obese.   HENT:      Head: Normocephalic and atraumatic.      Mouth/Throat:      Pharynx: Oropharynx is clear.   Eyes:      Extraocular Movements: Extraocular movements intact.   Neck:      Musculoskeletal: Normal range of motion and neck supple.   Cardiovascular:      Rate and Rhythm: Normal rate and regular rhythm.       Pulses: Normal pulses.      Heart sounds: Normal heart sounds.   Pulmonary:      Effort: No respiratory distress.      Breath sounds: Normal breath sounds. No wheezing or rhonchi.      Comments: Wearing bipap.  Abdominal:      General: Bowel sounds are normal.      Palpations: Abdomen is soft.      Tenderness: There is no abdominal tenderness. There is no guarding.   Musculoskeletal:      Right lower leg: Edema (trace) present.      Left lower leg: Edema (trace) present.   Skin:     General: Skin is warm and dry.      Capillary Refill: Capillary refill takes less than 2 seconds.      Comments: Wound with dressing to right heel.   Neurological:      General: No focal deficit present.      Mental Status: She is oriented to person, place, and time.      Motor: Weakness present.   Psychiatric:         Mood and Affect: Mood normal.         Behavior: Behavior normal.         Thought Content: Thought content normal.         Judgment: Judgment normal.         Significant Labs:   CBC:   Recent Labs   Lab 09/29/20 0317 09/30/20  0320   WBC 11.12 9.73   HGB 9.3* 9.0*   HCT 31.2* 30.2*    397*     CMP:   Recent Labs   Lab 09/29/20 0317 09/30/20  0321    142   K 3.2* 3.7    104   CO2 33* 35*   GLU 89 81   BUN 6* 8   CREATININE 0.4* 0.4*   CALCIUM 9.6 9.3   PROT 6.3 6.0   ALBUMIN 1.6* 1.6*   BILITOT 0.3 0.3   ALKPHOS 90 79   AST 14 11   ALT 14 14   ANIONGAP 5* 3*   EGFRNONAA >60.0 >60.0     Magnesium:   Recent Labs   Lab 09/29/20 0317 09/30/20  0321   MG 1.8 1.9     All pertinent labs within the past 24 hours have been reviewed.    Significant Imaging: I have reviewed all pertinent imaging results/findings within the past 24 hours.

## 2020-09-30 NOTE — PROGRESS NOTES
"Physical Therapy  Treatment    Martina Betancourt   MRN: [id number]   Admitting Diagnosis: Hospital-acquired pneumonia         start time: 1325  Stop time: 1350          Billable Minutes:  Therapeutic Activity 25 minutes                     General Precautions: Standard, fall, other (see comments)(sl\kin breakdown)  MRSA blood       Subjective:  Communicated with nurse prior to session.    Pain: no apparent pain       Objective:    -pt laying in bed, more alert today and willing to participate    Functional Mobility:  Bed Mobility:    -rolling max assist x 2  -supine to/from sit max-total assist x 2  -scooting in sitting total assist  -scooting up in bed total assist x 2    Transfers:   -attempted sit to stand edge of bed x 2 max assist x 2, buttocks barely cleared bed then pt states "I;m afraid of that thing" referring to walker and reports she uses a WC at home and has not ambulated in a long time but gets to WC with family assist daily. Attempted sit to stand 3rd trial but pt unable to stand upright with max assist x 2 effort.    Gait: NA           Balance:   Static Sit: POOR+: Needs MINIMAL assist to maintain  Dynamic Sit: 0: N/A  Static Stand:NA; unable to achieve standing  Dynamic stand: NA      Therapeutic Activities and Exercises:    Patient left HOB elevated with all lines intact.    Assessment:  Martina Betancourt is a 69 y.o. female with a medical diagnosis of Hospital-acquired pneumonia and presents with decreased B LE strength and mobility.    Rehab identified problem list/impairments:      Rehab potential is fair.    Activity tolerance: Fair    Discharge recommendations:       Barriers to discharge:      Equipment recommendations:       GOALS:   Multidisciplinary Problems     Physical Therapy Goals        Problem: Physical Therapy Goal    Goal Priority Disciplines Outcome Goal Variances Interventions   Physical Therapy Goal     PT, PT/OT Ongoing, Progressing                     PLAN:    Continue POC   "     Brielle Hylton, PT  09/30/2020

## 2020-09-30 NOTE — PROGRESS NOTES
[facility] Medicine  Progress Note    Patient Name: Martina Betancourt  MRN: [id number]  Patient Class: IP- Inpatient   Admission Date: 9/23/2020  Length of Stay: 7 days  Attending Physician: Joe Fuller III, MD  Primary Care Provider: Joe Fuller Iii, MD        Subjective:     Principal Problem:Hospital-acquired pneumonia        HPI:  Pt is a 69-year-old white female history of Alzheimer's disease, CAD, HTN, Obstructive Sleep Apnea, and obesity presents to the [facility] ED via EMS with SOB and lethargy.  Hx of this in the past - recently admitted and transferred to an outlying facility due to elevated troponins.  Pt is alert, will wake up when prompted, is talking in complete sentences. DNR per previous ER records and confirmed by EMS.    Overview/Hospital Course:  09/24/2020 SD: BCx2 positive for gram+ cocci in clusters resembling staph - treat with linezolid, zosyn, and duoneb. Continuous oxygen therapy with bipap qHS. Urine culture pending - empiric treatment with zosyn and cipro. Medical management of Alzheimer's, CAD, and HTN. GI proph. with famotidine. DVT proph. with SCDs  09/25/2020 SD: BCx2 positive for staphylococcus aureus, susceptibility pending - continue Linezolid and duoneb. Urine culture positive for gram negative john, identification and susceptibility pending - continue Zosyn and Cipro. Pt reports back pain - managed with pain medicine. States she is feeling better, requesting PT. No problems overnight.  09/26 : seen and examined, remains in pain and no acute evebnts reported, urine pos for klebsiella , will change abx to cipro and dc zosyn  09/27 : seen and examined, remains in pain and no acute evebnts reported, urine pos for klebsiella , afebrile   09/28/2020 SD: Continue antibiotic therapy. Pt c/o back pain - managed with pain medicine.  09/29/2020 SD: Continue antibiotic therapy to complete minimum 7-day treatment. Continue bipap qHS and PRN. Replete potassium. Pt  reports back pain is manageable with pain medicine prescribed. No problems overnight.  09/30/2020 SE: Continue antibiotic therapy to complete minimum 7-day treatment. Continue bipap qHS and PRN. Potassium stabilized. Pt reports improved appetite, requests food vs clear liquid diet. Pt denies any complaints. No problems overnight.    Interval History: See hospital course.    Review of Systems   Constitutional: Positive for activity change and fatigue. Negative for appetite change, chills and fever.   HENT: Negative for ear pain, mouth sores, sinus pressure, sinus pain and sore throat.    Eyes: Negative for photophobia and visual disturbance.   Respiratory: Positive for cough. Negative for shortness of breath and wheezing.    Cardiovascular: Negative for chest pain, palpitations and leg swelling.   Gastrointestinal: Negative for abdominal pain, constipation, diarrhea, nausea and vomiting.   Endocrine: Negative for cold intolerance and heat intolerance.   Genitourinary: Negative for difficulty urinating.   Musculoskeletal: Positive for back pain and myalgias.   Skin: Positive for wound. Negative for rash.   Neurological: Positive for weakness. Negative for dizziness and light-headedness.     Objective:     Vital Signs (Most Recent):  Temp: 98.5 °F (36.9 °C) (09/30/20 0705)  Pulse: 65 (09/30/20 0751)  Resp: 20 (09/30/20 0751)  BP: (!) 141/66 (09/30/20 0705)  SpO2: 96 % (09/30/20 0751) Vital Signs (24h Range):  Temp:  [97.6 °F (36.4 °C)-98.5 °F (36.9 °C)] 98.5 °F (36.9 °C)  Pulse:  [57-66] 65  Resp:  [18-22] 20  SpO2:  [92 %-98 %] 96 %  BP: (132-147)/(63-71) 141/66     Weight: (!) 139.7 kg (308 lb)  Body mass index is 41.77 kg/m².    Intake/Output Summary (Last 24 hours) at 9/30/2020 0911  Last data filed at 9/30/2020 0600  Gross per 24 hour   Intake 1600 ml   Output 2807 ml   Net -1207 ml      Physical Exam  Vitals signs and nursing note reviewed.   Constitutional:       General: She is not in acute distress.      Appearance: She is obese.   HENT:      Head: Normocephalic and atraumatic.      Mouth/Throat:      Pharynx: Oropharynx is clear.   Eyes:      Extraocular Movements: Extraocular movements intact.   Neck:      Musculoskeletal: Normal range of motion and neck supple.   Cardiovascular:      Rate and Rhythm: Normal rate and regular rhythm.      Pulses: Normal pulses.      Heart sounds: Normal heart sounds.   Pulmonary:      Effort: No respiratory distress.      Breath sounds: Normal breath sounds. No wheezing or rhonchi.      Comments: Wearing bipap.  Abdominal:      General: Bowel sounds are normal.      Palpations: Abdomen is soft.      Tenderness: There is no abdominal tenderness. There is no guarding.   Musculoskeletal:      Right lower leg: Edema (trace) present.      Left lower leg: Edema (trace) present.   Skin:     General: Skin is warm and dry.      Capillary Refill: Capillary refill takes less than 2 seconds.      Comments: Wound with dressing to right heel.   Neurological:      General: No focal deficit present.      Mental Status: She is oriented to person, place, and time.      Motor: Weakness present.   Psychiatric:         Mood and Affect: Mood normal.         Behavior: Behavior normal.         Thought Content: Thought content normal.         Judgment: Judgment normal.         Significant Labs:   CBC:   Recent Labs   Lab 09/29/20 0317 09/30/20  0320   WBC 11.12 9.73   HGB 9.3* 9.0*   HCT 31.2* 30.2*    397*     CMP:   Recent Labs   Lab 09/29/20 0317 09/30/20  0321    142   K 3.2* 3.7    104   CO2 33* 35*   GLU 89 81   BUN 6* 8   CREATININE 0.4* 0.4*   CALCIUM 9.6 9.3   PROT 6.3 6.0   ALBUMIN 1.6* 1.6*   BILITOT 0.3 0.3   ALKPHOS 90 79   AST 14 11   ALT 14 14   ANIONGAP 5* 3*   EGFRNONAA >60.0 >60.0     Magnesium:   Recent Labs   Lab 09/29/20 0317 09/30/20  0321   MG 1.8 1.9     All pertinent labs within the past 24 hours have been reviewed.    Significant Imaging: I have reviewed all  pertinent imaging results/findings within the past 24 hours.      Assessment/Plan:      * Hospital-acquired pneumonia  BCx2 positive for staphylococcus aureus - continue Linezolid and duoneb treatments    Hypoxia  Continuous oxygen therapy with bipap qHS and PRN.      UTI (urinary tract infection)  Urine culture positive for klebsiella pneumoniae. Zosyn discontinued S/T sensitivity report. Continue Cipro.    Myopathy  - My patient Martina Betancourt (1950) was last seen and evaluated by me on 09/25/2020. She has a diagnosis of pneumonia, hypoxia, UTI, and myopathy. This causes her to require an electric hospital bed due to an increased risk of aspiration unless the patient's head and upper body are elevated above 30 degrees. Pillows or wedges will not be efficient for this task. I believe an electric hospital bed will provide a safer environment for her by providing variable height feature to assist in transport from bed to wheelchair. As a result, Martina Betancourt can have a better quality of life. Please take into consideration my medical opinion when making a decision for my patient regarding the request for an electric hospital bed.  - Hospital bed approved.  - Recommend trapeze for hospital bed to assist with facilitating movement.      Hypokalemia  - Replete and monitor  - Resolved        VTE Risk Mitigation (From admission, onward)         Ordered     IP VTE HIGH RISK PATIENT  Once      09/23/20 1621     Place sequential compression device  Until discontinued      09/23/20 1621                Discharge Planning   BRIT:      Code Status: DNR   Is the patient medically ready for discharge?: No    Reason for patient still in hospital (select all that apply): Patient trending condition, Laboratory test and Treatment  Discharge Plan A: Home, Home Health                  Ramona Downey NP  Department of Hospital Medicine   [facility]

## 2020-09-30 NOTE — PLAN OF CARE
Pt rested comfortably throughout shift, requested pain medication x 2. IVF's infusing per MD orders, site patent.

## 2020-09-30 NOTE — PHYSICIAN QUERY
PT Name: Martina Betancourt  MR #: [id number]     RESPIRATORY CONDITION CLARIFICATION     CDS/: Charley Reyes RN         Contact information: [email or web address]  This form is a permanent document in the medical record.     Query Date: September 30, 2020    By submitting this query, we are merely seeking further clarification of documentation.  Please utilize your independent clinical judgment when addressing the question(s) below.  The Medical Record contains the following   Indicators   Supporting Clinical Findings Location in Medical Record   x SOB, BISHOP, Wheezing, Productive Cough, Use of Accessory Muscles, etc. SOB since last night    Negative for wheezing    Respiratory: Positive for cough and shortness of breath     ED Provider Note  of 9/23    HM H & P of 9/24    HM H & P of 9/24   x RR         ABGs         O2 sat Temp:  [97.6 °F (36.4 °C)-101.1 °F (38.4 °C)]   Pulse:  [55-86]   Resp:  [19-30]   SpO2:  [78 %-99 %]   BP: (107-158)/(51-79)       POC PH 7.399   POC PCO2 46.3 (H)   POC PO2 83.1   POC HCO3 27.2   POC SATURATED O2 96.9   POC TCO2 26.1   FiO2 50.0   O2DEVICE BIPAP   Specimen source Arterial      H & P of 9/24                Blood Gases of 9/23   x Hypoxia/Hypercapnia Hypoxia    H & P of 9/24   x BiPAP/Intubation/Mechanical Ventilation BIPAP up to 50%    Vital Sign Flowsheet of 9/23 to 9/29     x Supplemental O2 O2 NC up to 4L Vital Sign Flowsheet 9/25 to 9/29      Home O2, Oxygen Dependence        Respiratory Distress or Failure       x Radiology Findings Left lower lung zone opacity with indistinctness of left hemidiaphragm, may reflect atelectasis or evolving airspace disease with a small left pleural effusion.   Chest xray of 9/23   x Acute/Chronic Illness Hospital-acquired Pneumonia    history of Alzheimer's disease coronary disease hypertension obstructive sleep apnea obesity    ED Provider Note of 9/23    Treatment      Other       Respiratory failure can be acute, chronic or both. It is  generally further specified as hypoxic, hypercapnic or both. Lastly, it is important to identify an etiology, if known or suspected.   References:: https://www.acphospitalist.org/archives/2013/10/coding.htm    http://[email or web address]/acute-respiratory-failure-know    The noted clinical guidelines are only system guidelines and do not replace the providers clinical judgment.    Provider, please specify diagnosis or diagnoses associated with above clinical findings.     [    ] Acute Respiratory Failure with Hypoxia - ABG pO2 < 60 mmHg or O2 sat of <91% on room air and respiratory symptoms documented   [ X ] Hypoxia Only   [    ] Other Respiratory Diagnosis (please specify): _________________   [   ] Clinically Undetermined            Please document in your progress notes daily for the duration of treatment until resolved and include in your discharge summary.

## 2020-10-01 VITALS
RESPIRATION RATE: 20 BRPM | TEMPERATURE: 98 F | OXYGEN SATURATION: 96 % | WEIGHT: 293 LBS | BODY MASS INDEX: 39.68 KG/M2 | SYSTOLIC BLOOD PRESSURE: 132 MMHG | HEIGHT: 72 IN | HEART RATE: 65 BPM | DIASTOLIC BLOOD PRESSURE: 60 MMHG

## 2020-10-01 LAB
ALBUMIN SERPL BCP-MCNC: 1.6 G/DL (ref 3.5–5.2)
ALP SERPL-CCNC: 85 U/L (ref 55–135)
ALT SERPL W/O P-5'-P-CCNC: 13 U/L (ref 10–44)
ANION GAP SERPL CALC-SCNC: 3 MMOL/L (ref 8–16)
AST SERPL-CCNC: 9 U/L (ref 10–40)
BASOPHILS # BLD AUTO: 0.02 K/UL (ref 0–0.2)
BASOPHILS NFR BLD: 0.2 % (ref 0–1.9)
BILIRUB SERPL-MCNC: 0.3 MG/DL (ref 0.1–1)
BUN SERPL-MCNC: 8 MG/DL (ref 8–23)
CALCIUM SERPL-MCNC: 9.4 MG/DL (ref 8.7–10.5)
CHLORIDE SERPL-SCNC: 102 MMOL/L (ref 95–110)
CO2 SERPL-SCNC: 34 MMOL/L (ref 23–29)
CREAT SERPL-MCNC: 0.4 MG/DL (ref 0.5–1.4)
DIFFERENTIAL METHOD: ABNORMAL
EOSINOPHIL # BLD AUTO: 0.2 K/UL (ref 0–0.5)
EOSINOPHIL NFR BLD: 2.1 % (ref 0–8)
ERYTHROCYTE [DISTWIDTH] IN BLOOD BY AUTOMATED COUNT: 15.8 % (ref 11.5–14.5)
EST. GFR  (AFRICAN AMERICAN): >60 ML/MIN/1.73 M^2
EST. GFR  (NON AFRICAN AMERICAN): >60 ML/MIN/1.73 M^2
GLUCOSE SERPL-MCNC: 80 MG/DL (ref 70–110)
HCT VFR BLD AUTO: 31.1 % (ref 37–48.5)
HGB BLD-MCNC: 9.3 G/DL (ref 12–16)
IMM GRANULOCYTES # BLD AUTO: 0.05 K/UL (ref 0–0.04)
IMM GRANULOCYTES NFR BLD AUTO: 0.5 % (ref 0–0.5)
LYMPHOCYTES # BLD AUTO: 1.9 K/UL (ref 1–4.8)
LYMPHOCYTES NFR BLD: 18 % (ref 18–48)
MAGNESIUM SERPL-MCNC: 1.8 MG/DL (ref 1.6–2.6)
MCH RBC QN AUTO: 27.4 PG (ref 27–31)
MCHC RBC AUTO-ENTMCNC: 29.9 G/DL (ref 32–36)
MCV RBC AUTO: 92 FL (ref 82–98)
MONOCYTES # BLD AUTO: 1.2 K/UL (ref 0.3–1)
MONOCYTES NFR BLD: 11.5 % (ref 4–15)
NEUTROPHILS # BLD AUTO: 7 K/UL (ref 1.8–7.7)
NEUTROPHILS NFR BLD: 67.7 % (ref 38–73)
NRBC BLD-RTO: 0 /100 WBC
PLATELET # BLD AUTO: 440 K/UL (ref 150–350)
PMV BLD AUTO: 9 FL (ref 9.2–12.9)
POTASSIUM SERPL-SCNC: 3.6 MMOL/L (ref 3.5–5.1)
PROT SERPL-MCNC: 6.4 G/DL (ref 6–8.4)
RBC # BLD AUTO: 3.39 M/UL (ref 4–5.4)
SODIUM SERPL-SCNC: 139 MMOL/L (ref 136–145)
WBC # BLD AUTO: 10.4 K/UL (ref 3.9–12.7)

## 2020-10-01 PROCEDURE — 99900035 HC TECH TIME PER 15 MIN (STAT)

## 2020-10-01 PROCEDURE — 27000221 HC OXYGEN, UP TO 24 HOURS

## 2020-10-01 PROCEDURE — 90471 IMMUNIZATION ADMIN: CPT | Performed by: INTERNAL MEDICINE

## 2020-10-01 PROCEDURE — 25000003 PHARM REV CODE 250: Performed by: NURSE PRACTITIONER

## 2020-10-01 PROCEDURE — 90472 IMMUNIZATION ADMIN EACH ADD: CPT | Performed by: INTERNAL MEDICINE

## 2020-10-01 PROCEDURE — 94660 CPAP INITIATION&MGMT: CPT

## 2020-10-01 PROCEDURE — 63600175 PHARM REV CODE 636 W HCPCS: Performed by: INTERNAL MEDICINE

## 2020-10-01 PROCEDURE — 94761 N-INVAS EAR/PLS OXIMETRY MLT: CPT

## 2020-10-01 PROCEDURE — 99900031 HC PATIENT EDUCATION (STAT)

## 2020-10-01 PROCEDURE — 85025 COMPLETE CBC W/AUTO DIFF WBC: CPT

## 2020-10-01 PROCEDURE — 94640 AIRWAY INHALATION TREATMENT: CPT

## 2020-10-01 PROCEDURE — 90694 VACC AIIV4 NO PRSRV 0.5ML IM: CPT | Performed by: INTERNAL MEDICINE

## 2020-10-01 PROCEDURE — 36415 COLL VENOUS BLD VENIPUNCTURE: CPT

## 2020-10-01 PROCEDURE — G0008 ADMIN INFLUENZA VIRUS VAC: HCPCS | Performed by: INTERNAL MEDICINE

## 2020-10-01 PROCEDURE — 25000003 PHARM REV CODE 250: Performed by: EMERGENCY MEDICINE

## 2020-10-01 PROCEDURE — 80053 COMPREHEN METABOLIC PANEL: CPT

## 2020-10-01 PROCEDURE — 25000003 PHARM REV CODE 250: Performed by: INTERNAL MEDICINE

## 2020-10-01 PROCEDURE — 63600175 PHARM REV CODE 636 W HCPCS: Performed by: NURSE PRACTITIONER

## 2020-10-01 PROCEDURE — 25000242 PHARM REV CODE 250 ALT 637 W/ HCPCS: Performed by: INTERNAL MEDICINE

## 2020-10-01 PROCEDURE — 63600175 PHARM REV CODE 636 W HCPCS: Performed by: EMERGENCY MEDICINE

## 2020-10-01 PROCEDURE — 83735 ASSAY OF MAGNESIUM: CPT

## 2020-10-01 RX ADMIN — A/SINGAPORE/GP1908/2015 IVR-180 (AN A/MICHIGAN/45/2015 (H1N1)PDM09-LIKE VIRUS, A/HONG KONG/4801/2014, NYMC X-263B (H3N2) (AN A/HONG KONG/4801/2014-LIKE VIRUS), AND B/BRISBANE/60/2008, WILD TYPE (A B/BRISBANE/60/2008-LIKE VIRUS) 0.5 ML: 15; 15; 15 INJECTION, SUSPENSION INTRAMUSCULAR at 01:10

## 2020-10-01 RX ADMIN — MEMANTINE HYDROCHLORIDE 28 MG: 7 CAPSULE, EXTENDED RELEASE ORAL at 08:10

## 2020-10-01 RX ADMIN — ISOSORBIDE MONONITRATE 60 MG: 30 TABLET, EXTENDED RELEASE ORAL at 08:10

## 2020-10-01 RX ADMIN — IPRATROPIUM BROMIDE AND ALBUTEROL SULFATE 3 ML: .5; 3 SOLUTION RESPIRATORY (INHALATION) at 11:10

## 2020-10-01 RX ADMIN — FAMOTIDINE 20 MG: 20 TABLET ORAL at 08:10

## 2020-10-01 RX ADMIN — LINEZOLID 600 MG: 2 INJECTION, SOLUTION INTRAVENOUS at 06:10

## 2020-10-01 RX ADMIN — HYDROCODONE BITARTRATE AND ACETAMINOPHEN 1 TABLET: 10; 325 TABLET ORAL at 01:10

## 2020-10-01 RX ADMIN — IPRATROPIUM BROMIDE AND ALBUTEROL SULFATE 3 ML: .5; 3 SOLUTION RESPIRATORY (INHALATION) at 07:10

## 2020-10-01 RX ADMIN — METOPROLOL SUCCINATE 50 MG: 50 TABLET, EXTENDED RELEASE ORAL at 08:10

## 2020-10-01 RX ADMIN — LISINOPRIL 2.5 MG: 2.5 TABLET ORAL at 08:10

## 2020-10-01 RX ADMIN — CIPROFLOXACIN 400 MG: 2 INJECTION, SOLUTION INTRAVENOUS at 04:10

## 2020-10-01 RX ADMIN — CLOPIDOGREL 75 MG: 75 TABLET, FILM COATED ORAL at 08:10

## 2020-10-01 RX ADMIN — DULOXETINE HYDROCHLORIDE 60 MG: 60 CAPSULE, DELAYED RELEASE ORAL at 08:10

## 2020-10-01 RX ADMIN — HYDROCODONE BITARTRATE AND ACETAMINOPHEN 1 TABLET: 10; 325 TABLET ORAL at 08:10

## 2020-10-01 RX ADMIN — ASPIRIN 81 MG: 81 TABLET, COATED ORAL at 08:10

## 2020-10-01 NOTE — NURSING
[facility] home care called with report given to Chencho at this time. Currently awaiting oxygen to be delivered to oxygen before patient is released.

## 2020-10-01 NOTE — PLAN OF CARE
Followed up w/Jenifer in reference to ETA for DME.  Jackie will call me back as she did not get an answer from .

## 2020-10-01 NOTE — PLAN OF CARE
10/01/20 1022   Medicare Message   Date IMM was signed 10/01/20   Time IMM was signed 1009     Second signature obtained.

## 2020-10-01 NOTE — ASSESSMENT & PLAN NOTE
- BCx2 positive for staphylococcus aureus - continue Linezolid and duoneb treatments  - 7-day antibiotic therapy complete. Pt's condition improved. Plan to discharge home.

## 2020-10-01 NOTE — PLAN OF CARE
Followed up with Jenifer to check on status of bed delivery.  Per Jackie, bed and trapeze will be delivered today.

## 2020-10-01 NOTE — PLAN OF CARE
10/01/20 1026   Final Note   Assessment Type Final Discharge Note   Anticipated Discharge Disposition Home-Health   Post-Acute Status   Post-Acute Authorization HME;Home Health   Post-Acute Placement Status Set-up Complete   HME Status Set-up Complete   Home Health Status Set-up Complete   Discharge Delays None known at this time     Patient being discharged to home with home health services from [facility].  CM contacted patient's granddaughter Christelle to inform her of discharge and obtain consent for IM.  Patient's granddaughter in agreement w/discharge.  Informed her that hospital bed and trapeze will be delivered today and that we are currently working on getting patient home o2.  Informed her that once all is set up at home and oxygen delivered to hospital, patient can go home.  Informed her that she will also be followed by [facility].  CM did contact Chencho taylor/[facility] and informed her of discharge and that all orders were faxed.  Patient clear to dc from CM stand point and made primary nurse aware.

## 2020-10-01 NOTE — ASSESSMENT & PLAN NOTE
- Urine culture positive for klebsiella pneumoniae. Zosyn discontinued S/T sensitivity report. Continue Cipro.  -  7-day antibiotic therapy complete. Pt's condition improved. Plan to discharge home.

## 2020-10-01 NOTE — NURSING
Oxygen delivered to hospital. I called Alexsandra and verified that oxygen concentrator is at home. Patient leaving unit via wheelchair with all belongings in hand via wheelchair with 3l nasal cannuala in use.

## 2020-10-01 NOTE — SUBJECTIVE & OBJECTIVE
Interval History: ***    Review of Systems  Objective:     Vital Signs (Most Recent):  Temp: 98.5 °F (36.9 °C) (10/01/20 0706)  Pulse: 64 (10/01/20 0915)  Resp: 20 (10/01/20 0814)  BP: (!) 140/63 (10/01/20 0706)  SpO2: 95 % (10/01/20 0915) Vital Signs (24h Range):  Temp:  [97.9 °F (36.6 °C)-99.2 °F (37.3 °C)] 98.5 °F (36.9 °C)  Pulse:  [58-69] 64  Resp:  [18-22] 20  SpO2:  [78 %-100 %] 95 %  BP: (134-197)/(63-82) 140/63     Weight: (!) 139.7 kg (308 lb)  Body mass index is 41.77 kg/m².    Intake/Output Summary (Last 24 hours) at 10/1/2020 0918  Last data filed at 9/30/2020 1800  Gross per 24 hour   Intake 1740 ml   Output --   Net 1740 ml      Physical Exam    Significant Labs: {Results:14204}    Significant Imaging: {Imaging Review:91971}

## 2020-10-01 NOTE — ASSESSMENT & PLAN NOTE
- My patient Martina Betancourt (1950) was last seen and evaluated by me on 09/25/2020. She has a diagnosis of pneumonia, hypoxia, UTI, and myopathy. This causes her to require an electric hospital bed due to an increased risk of aspiration unless the patient's head and upper body are elevated above 30 degrees. Pillows or wedges will not be efficient for this task. I believe an electric hospital bed will provide a safer environment for her by providing variable height feature to assist in transport from bed to wheelchair. As a result, Martina Betancourt can have a better quality of life. Please take into consideration my medical opinion when making a decision for my patient regarding the request for an electric hospital bed.  - Hospital bed approved.  - Recommend trapeze for hospital bed to assist with facilitating movement.

## 2020-10-01 NOTE — CARE UPDATE
10/01/20 0722   Patient Assessment/Suction   Level of Consciousness (AVPU) alert   Respiratory Effort Normal;Unlabored   Expansion/Accessory Muscles/Retractions no use of accessory muscles   All Lung Fields Breath Sounds diminished   Rhythm/Pattern, Respiratory pattern regular   PRE-TX-O2   O2 Device (Oxygen Therapy) nasal cannula   $ Is the patient on Low Flow Oxygen? Yes   SpO2 (!) 91 %   Pulse Oximetry Type Intermittent   $ Pulse Oximetry - Multiple Charge Pulse Oximetry - Multiple   Pulse 64   Resp 20   Positioning HOB elevated 30 degrees   Aerosol Therapy   $ Aerosol Therapy Charges Aerosol Treatment   Daily Review of Necessity (SVN) completed   Respiratory Treatment Status (SVN) given   Treatment Route (SVN) mask   Patient Position (SVN) HOB elevated   Post Treatment Assessment (SVN) increased aeration   Signs of Intolerance (SVN) none   Breath Sounds Post-Respiratory Treatment   Throughout All Fields Post-Treatment All Fields   Throughout All Fields Post-Treatment aeration increased   Post-treatment Heart Rate (beats/min) 68   Post-treatment Resp Rate (breaths/min) 20   Respiratory Evaluation   $ Care Plan Tech Time 15 min

## 2020-10-01 NOTE — DISCHARGE SUMMARY
[facility] Medicine  Discharge Summary      Patient Name: Martina Betancourt  MRN: [id number]  Admission Date: 9/23/2020  Hospital Length of Stay: 8 days  Discharge Date and Time:  10/01/2020 9:37 AM  Attending Physician: Joe Fuller III, MD   Discharging Provider: Ramona Downey NP  Primary Care Provider: Joe Fuller Iii, MD      HPI:   Pt is a 69-year-old white female history of Alzheimer's disease, CAD, HTN, Obstructive Sleep Apnea, and obesity presents to the [facility] ED via EMS with SOB and lethargy.  Hx of this in the past - recently admitted and transferred to an outlying facility due to elevated troponins.  Pt is alert, will wake up when prompted, is talking in complete sentences. DNR per previous ER records and confirmed by EMS.    * No surgery found *      Hospital Course:   09/24/2020 SD: BCx2 positive for gram+ cocci in clusters resembling staph - treat with linezolid, zosyn, and duoneb. Continuous oxygen therapy with bipap qHS. Urine culture pending - empiric treatment with zosyn and cipro. Medical management of Alzheimer's, CAD, and HTN. GI proph. with famotidine. DVT proph. with SCDs  09/25/2020 SD: BCx2 positive for staphylococcus aureus, susceptibility pending - continue Linezolid and duoneb. Urine culture positive for gram negative john, identification and susceptibility pending - continue Zosyn and Cipro. Pt reports back pain - managed with pain medicine. States she is feeling better, requesting PT. No problems overnight.  09/26 : seen and examined, remains in pain and no acute evebnts reported, urine pos for klebsiella , will change abx to cipro and dc zosyn  09/27 : seen and examined, remains in pain and no acute evebnts reported, urine pos for klebsiella , afebrile   09/28/2020 SD: Continue antibiotic therapy. Pt c/o back pain - managed with pain medicine.  09/29/2020 SD: Continue antibiotic therapy to complete minimum 7-day treatment. Continue bipap qHS and PRN.  Replete potassium. Pt reports back pain is manageable with pain medicine prescribed. No problems overnight.  09/30/2020 SD: Continue antibiotic therapy to complete minimum 7-day treatment. Continue bipap qHS and PRN. Potassium stabilized. Pt reports improved appetite, requests food vs clear liquid diet. Pt denies any complaints. No problems overnight.  10/01/2020 SD:  7-day antibiotic therapy complete. Pt's condition improved. Denies any c/o with no problems overnight. Plan to discharge home.     Consults:   Consults (From admission, onward)        Status Ordering Provider     Inpatient consult to Registered Dietitian/Nutritionist  Once     Provider:  (Not yet assigned)    Completed ERIC FARRELL III     Inpatient consult to Social Work/Case Management  Once     Provider:  (Not yet assigned)    Acknowledged ERIC FARRELL III     IP consult to case management  Once     Provider:  (Not yet assigned)    Acknowledged ERIC FARRELL III          * Hospital-acquired pneumonia  - BCx2 positive for staphylococcus aureus - continue Linezolid and duoneb treatments  - 7-day antibiotic therapy complete. Pt's condition improved. Plan to discharge home.    Hypoxia  - Continuous oxygen therapy with bipap qHS and PRN.  - Pt 78% on room air, 95% on 3L NC. Qualifies for home oxygen.      UTI (urinary tract infection)  - Urine culture positive for klebsiella pneumoniae. Zosyn discontinued S/T sensitivity report. Continue Cipro.  -  7-day antibiotic therapy complete. Pt's condition improved. Plan to discharge home.    Myopathy  - My patient Martina Betancourt (1950) was last seen and evaluated by me on 09/25/2020. She has a diagnosis of pneumonia, hypoxia, UTI, and myopathy. This causes her to require an electric hospital bed due to an increased risk of aspiration unless the patient's head and upper body are elevated above 30 degrees. Pillows or wedges will not be efficient for this task. I believe an electric hospital bed  will provide a safer environment for her by providing variable height feature to assist in transport from bed to wheelchair. As a result, Martina Betancourt can have a better quality of life. Please take into consideration my medical opinion when making a decision for my patient regarding the request for an electric hospital bed.  - Hospital bed approved.  - Recommend trapeze for hospital bed to assist with facilitating movement.      Hypokalemia  - Replete and monitor  - Resolved        Final Active Diagnoses:    Diagnosis Date Noted POA    PRINCIPAL PROBLEM:  Hospital-acquired pneumonia [J18.9, Y95] 09/23/2020 Yes    Hypoxia [R09.02] 09/24/2020 Yes    UTI (urinary tract infection) [N39.0] 09/24/2020 Yes    Myopathy [G72.9] 07/28/2020 Yes    Hypokalemia [E87.6] 09/29/2020 No      Problems Resolved During this Admission:       Discharged Condition: fair    Disposition:     Follow Up:   Contact information for follow-up providers     Joe Fuller Iii, MD. Schedule an appointment as soon as possible for a visit in 2 weeks.    Specialty: Internal Medicine  Contact information:  [address] [place] [id number] [phone]                   Contact information for after-discharge care     Durable Medical Equipment     [facility] .    Service: Durable Medical Equipment  Contact information:  [address] [place] [phone]                           Patient Instructions:      HOSPITAL BED FOR HOME USE     Order Specific Question Answer Comments   Type: Semi-electric    Length of need (1-99 months): 99    Does patient have medical equipment at home? bedside commode transporting chair / transporting chair   Does patient have medical equipment at home? other (see comments)    Height: 6' (1.829 m)    Weight: 140 kg (308 lb 11.2 oz)    Please check all that apply: Patient requires positioning of the body in ways not feasible in an ordinary bed due to a medical condition which is expected to last at  least one month.    Please check all that apply: Patient requires, for the alleviation of pain, positioning of the body in ways not feasible in an ordinary bed.    Please check all that apply: Patient requires the head of bed to be elevated more than 30 degrees most of the time due to congestive heart failure, chronic pulmonary disease, or aspiration.  Pillows and wedges have been considered and ruled out.    Please check all that apply: Patient requires a bed height different than a fixed height hospital bed to permit transfers to chair, wheelchair, or standing.    Please check all that apply: Patient requires frequent changes in body position and/or has an immediate need for a change in body position.      HME - OTHER     Order Specific Question Answer Comments   Type of Equipment: Trapeze for hospital bed    Height: 6' (1.829 m)    Weight: 139.7 kg (308 lb)    Does patient have medical equipment at home? bedside commode transporting chair / transporting chair   Does patient have medical equipment at home? other (see comments)      Home O2 eval (desaturation screen)       Significant Diagnostic Studies: Labs:   CMP   Recent Labs   Lab 09/30/20  0321 10/01/20  0530    139   K 3.7 3.6    102   CO2 35* 34*   GLU 81 80   BUN 8 8   CREATININE 0.4* 0.4*   CALCIUM 9.3 9.4   PROT 6.0 6.4   ALBUMIN 1.6* 1.6*   BILITOT 0.3 0.3   ALKPHOS 79 85   AST 11 9*   ALT 14 13   ANIONGAP 3* 3*   ESTGFRAFRICA >60.0 >60.0   EGFRNONAA >60.0 >60.0   , CBC   Recent Labs   Lab 09/30/20  0320 10/01/20  0530   WBC 9.73 10.40   HGB 9.0* 9.3*   HCT 30.2* 31.1*   * 440*    and All labs within the past 24 hours have been reviewed  Microbiology:   Blood Culture   Lab Results   Component Value Date    LABBLOO No Growth to date 09/26/2020    LABBLOO No Growth to date 09/26/2020    LABBLOO No Growth to date 09/26/2020    LABBLOO No Growth to date 09/26/2020    LABBLOO No Growth to date 09/26/2020    and Urine Culture    Lab Results    Component Value Date    LABURIN KLEBSIELLA PNEUMONIAE  >100,000 cfu/ml   (A) 09/23/2020       Pending Diagnostic Studies:     None         Medications:  Reconciled Home Medications:      Medication List      STOP taking these medications    HYDROcodone-acetaminophen  mg per tablet  Commonly known as: NORCO        ASK your doctor about these medications    acetaminophen 325 MG tablet  Commonly known as: TYLENOL  Take 2 tablets (650 mg total) by mouth every 6 (six) hours as needed (HEADACHE, TEMPERATURE - FEVER > 100.4).     albuterol-ipratropium 2.5 mg-0.5 mg/3 mL nebulizer solution  Commonly known as: DUO-NEB  Take 3 mLs by nebulization every 6 (six) hours as needed for Wheezing or Shortness of Breath. Rescue     aspirin 81 MG EC tablet  Commonly known as: ECOTRIN  Take 81 mg by mouth once daily.     clopidogreL 75 mg tablet  Commonly known as: PLAVIX  Take 1 tablet (75 mg total) by mouth once daily.     donepeziL 10 MG tablet  Commonly known as: ARICEPT  Take 10 mg by mouth every evening.     DULoxetine 60 MG capsule  Commonly known as: CYMBALTA  Take 60 mg by mouth once daily.     ferrous sulfate 324 mg (65 mg iron) Tbec  Take 325 mg by mouth every evening.     furosemide 40 MG tablet  Commonly known as: LASIX  Take 1 tablet (40 mg total) by mouth once daily.     isosorbide mononitrate 60 MG 24 hr tablet  Commonly known as: IMDUR  Take 60 mg by mouth once daily.     lisinopriL 2.5 MG tablet  Commonly known as: PRINIVIL,ZESTRIL  Take 1 tablet (2.5 mg total) by mouth once daily.     memantine 28 mg Cspx  Commonly known as: NAMENDA XR  Take 28 mg by mouth once daily.     metoprolol succinate 50 MG 24 hr tablet  Commonly known as: TOPROL-XL  Take 50 mg by mouth once daily.     miconazole nitrate 2% 2 % Oint  Commonly known as: MICOTIN  Apply topically 2 (two) times daily.     pantoprazole 40 MG tablet  Commonly known as: PROTONIX  Take 1 tablet (40 mg total) by mouth before breakfast.     pravastatin 40 MG  tablet  Commonly known as: PRAVACHOL  Take 40 mg by mouth every evening.            Indwelling Lines/Drains at time of discharge:   Lines/Drains/Airways     None                 Time spent on the discharge of patient: 30 minutes  Patient was seen and examined on the date of discharge and determined to be suitable for discharge.         Ramona Downey NP  Department of Hospital Medicine  [facility]

## 2020-10-01 NOTE — CARE UPDATE
10/01/20 0915   PRE-TX-O2   O2 Device (Oxygen Therapy) nasal cannula   Flow (L/min) 3   Oxygen Concentration (%) 32   SpO2 95 %   Pulse 64

## 2020-10-01 NOTE — CARE UPDATE
10/01/20 0920   PRE-TX-O2   O2 Device (Oxygen Therapy) nasal cannula   Flow (L/min) 2   Oxygen Concentration (%) 28   SpO2 (!) 93 %   Pulse 65

## 2020-10-01 NOTE — ASSESSMENT & PLAN NOTE
- Continuous oxygen therapy with bipap qHS and PRN.  - Pt 78% on room air, 95% on 3L NC. Qualifies for home oxygen.

## 2020-10-02 LAB — BACTERIA BLD CULT: NORMAL

## 2020-10-06 NOTE — PHYSICIAN QUERY
PT Name: Martina Betancourt  MR #: [id number]    CAUSE AND EFFECT RELATIONSHIP CLARIFICATION     CDS/: Charley Reyes RN             Contact information: [email or web address]    This form is a permanent document in the medical record.     Query Date: October 6, 2020    By submitting this query, we are merely seeking further clarification of documentation. Please utilize your independent clinical judgment when addressing the question(s) below.    Supporting Clinical Findings   Location in Medical Record   Hospital-acquired pneumonia  BCx2 positive for staphylococcus aureus, susceptibility pending - continue Linezolid and duoneb treatments                                                                                                                                                                                   HM PN of 9/25   Methicillin Resistant Staphylococcus Aureus                                                                                                                                                                                       Blood cultures of 9/23         Provider, please clarify if there is any clinical correlation between Hospital-acquired pneumonia and Methicillin Resistant Staphylococcus Aureus              Are the conditions:      [  ] Due to or associated with each other   [  ] Unrelated to each other   [  ] Other explanation (Please Specify): ______________   [ x ] Clinically Undetermined                                                                               Please document in your progress notes daily for the duration of treatment until resolved and include in your discharge summary.

## 2020-10-07 ENCOUNTER — HOSPITAL ENCOUNTER (OUTPATIENT)
Facility: HOSPITAL | Age: 70
Discharge: ANOTHER HOSPITAL | End: 2020-10-08
Attending: EMERGENCY MEDICINE | Admitting: INTERNAL MEDICINE
Payer: MEDICARE

## 2020-10-07 DIAGNOSIS — M54.9 BACK PAIN, UNSPECIFIED BACK LOCATION, UNSPECIFIED BACK PAIN LATERALITY, UNSPECIFIED CHRONICITY: ICD-10-CM

## 2020-10-07 DIAGNOSIS — M46.44 DISCITIS OF THORACIC REGION: Primary | ICD-10-CM

## 2020-10-07 LAB
ALBUMIN SERPL BCP-MCNC: 1.8 G/DL (ref 3.5–5.2)
ALP SERPL-CCNC: 98 U/L (ref 55–135)
ALT SERPL W/O P-5'-P-CCNC: 9 U/L (ref 10–44)
ANION GAP SERPL CALC-SCNC: 4 MMOL/L (ref 8–16)
AST SERPL-CCNC: 11 U/L (ref 10–40)
BASOPHILS # BLD AUTO: 0.02 K/UL (ref 0–0.2)
BASOPHILS NFR BLD: 0.1 % (ref 0–1.9)
BILIRUB SERPL-MCNC: 0.4 MG/DL (ref 0.1–1)
BILIRUB UR QL STRIP: NEGATIVE
BUN SERPL-MCNC: 8 MG/DL (ref 8–23)
CALCIUM SERPL-MCNC: 9.5 MG/DL (ref 8.7–10.5)
CHLORIDE SERPL-SCNC: 99 MMOL/L (ref 95–110)
CLARITY UR: CLEAR
CO2 SERPL-SCNC: 35 MMOL/L (ref 23–29)
COLOR UR: YELLOW
CREAT SERPL-MCNC: 0.5 MG/DL (ref 0.5–1.4)
DIFFERENTIAL METHOD: ABNORMAL
EOSINOPHIL # BLD AUTO: 0 K/UL (ref 0–0.5)
EOSINOPHIL NFR BLD: 0.1 % (ref 0–8)
ERYTHROCYTE [DISTWIDTH] IN BLOOD BY AUTOMATED COUNT: 15.9 % (ref 11.5–14.5)
EST. GFR  (AFRICAN AMERICAN): >60 ML/MIN/1.73 M^2
EST. GFR  (NON AFRICAN AMERICAN): >60 ML/MIN/1.73 M^2
GLUCOSE SERPL-MCNC: 96 MG/DL (ref 70–110)
GLUCOSE UR QL STRIP: NEGATIVE
HCT VFR BLD AUTO: 35.4 % (ref 37–48.5)
HGB BLD-MCNC: 10.6 G/DL (ref 12–16)
HGB UR QL STRIP: ABNORMAL
HYALINE CASTS #/AREA URNS LPF: 5 /LPF
IMM GRANULOCYTES # BLD AUTO: 0.05 K/UL (ref 0–0.04)
IMM GRANULOCYTES NFR BLD AUTO: 0.3 % (ref 0–0.5)
KETONES UR QL STRIP: NEGATIVE
LEUKOCYTE ESTERASE UR QL STRIP: ABNORMAL
LIPASE SERPL-CCNC: 61 U/L (ref 23–300)
LYMPHOCYTES # BLD AUTO: 2 K/UL (ref 1–4.8)
LYMPHOCYTES NFR BLD: 13.4 % (ref 18–48)
MCH RBC QN AUTO: 27.2 PG (ref 27–31)
MCHC RBC AUTO-ENTMCNC: 29.9 G/DL (ref 32–36)
MCV RBC AUTO: 91 FL (ref 82–98)
MICROSCOPIC COMMENT: ABNORMAL
MONOCYTES # BLD AUTO: 1.5 K/UL (ref 0.3–1)
MONOCYTES NFR BLD: 10.4 % (ref 4–15)
NEUTROPHILS # BLD AUTO: 11 K/UL (ref 1.8–7.7)
NEUTROPHILS NFR BLD: 75.7 % (ref 38–73)
NITRITE UR QL STRIP: NEGATIVE
NRBC BLD-RTO: 0 /100 WBC
PH UR STRIP: 8 [PH] (ref 5–8)
PLATELET # BLD AUTO: 537 K/UL (ref 150–350)
PMV BLD AUTO: 8.9 FL (ref 9.2–12.9)
POTASSIUM SERPL-SCNC: 3.6 MMOL/L (ref 3.5–5.1)
PROT SERPL-MCNC: 7.1 G/DL (ref 6–8.4)
PROT UR QL STRIP: NEGATIVE
RBC # BLD AUTO: 3.89 M/UL (ref 4–5.4)
RBC #/AREA URNS HPF: 14 /HPF (ref 0–4)
SARS-COV-2 RDRP RESP QL NAA+PROBE: NEGATIVE
SODIUM SERPL-SCNC: 138 MMOL/L (ref 136–145)
SP GR UR STRIP: 1.01 (ref 1–1.03)
SQUAMOUS #/AREA URNS HPF: 3 /HPF
URN SPEC COLLECT METH UR: ABNORMAL
UROBILINOGEN UR STRIP-ACNC: 1 EU/DL
WBC # BLD AUTO: 14.55 K/UL (ref 3.9–12.7)
WBC #/AREA URNS HPF: 5 /HPF (ref 0–5)

## 2020-10-07 PROCEDURE — 36415 COLL VENOUS BLD VENIPUNCTURE: CPT

## 2020-10-07 PROCEDURE — 25000003 PHARM REV CODE 250: Performed by: EMERGENCY MEDICINE

## 2020-10-07 PROCEDURE — 96375 TX/PRO/DX INJ NEW DRUG ADDON: CPT | Performed by: EMERGENCY MEDICINE

## 2020-10-07 PROCEDURE — G0378 HOSPITAL OBSERVATION PER HR: HCPCS

## 2020-10-07 PROCEDURE — 96365 THER/PROPH/DIAG IV INF INIT: CPT | Performed by: EMERGENCY MEDICINE

## 2020-10-07 PROCEDURE — 27000221 HC OXYGEN, UP TO 24 HOURS

## 2020-10-07 PROCEDURE — 63600175 PHARM REV CODE 636 W HCPCS: Performed by: EMERGENCY MEDICINE

## 2020-10-07 PROCEDURE — 81000 URINALYSIS NONAUTO W/SCOPE: CPT

## 2020-10-07 PROCEDURE — 99285 EMERGENCY DEPT VISIT HI MDM: CPT | Mod: 25

## 2020-10-07 PROCEDURE — U0002 COVID-19 LAB TEST NON-CDC: HCPCS

## 2020-10-07 PROCEDURE — 85025 COMPLETE CBC W/AUTO DIFF WBC: CPT

## 2020-10-07 PROCEDURE — 83690 ASSAY OF LIPASE: CPT

## 2020-10-07 PROCEDURE — 80053 COMPREHEN METABOLIC PANEL: CPT

## 2020-10-07 PROCEDURE — 99900035 HC TECH TIME PER 15 MIN (STAT)

## 2020-10-07 RX ORDER — MEMANTINE HYDROCHLORIDE 7 MG/1
28 CAPSULE, EXTENDED RELEASE ORAL DAILY
Status: DISCONTINUED | OUTPATIENT
Start: 2020-10-08 | End: 2020-10-08 | Stop reason: HOSPADM

## 2020-10-07 RX ORDER — DULOXETIN HYDROCHLORIDE 60 MG/1
60 CAPSULE, DELAYED RELEASE ORAL DAILY
Status: DISCONTINUED | OUTPATIENT
Start: 2020-10-08 | End: 2020-10-08 | Stop reason: HOSPADM

## 2020-10-07 RX ORDER — IPRATROPIUM BROMIDE AND ALBUTEROL SULFATE 2.5; .5 MG/3ML; MG/3ML
3 SOLUTION RESPIRATORY (INHALATION) EVERY 6 HOURS PRN
Status: DISCONTINUED | OUTPATIENT
Start: 2020-10-07 | End: 2020-10-08 | Stop reason: HOSPADM

## 2020-10-07 RX ORDER — HYDROCODONE BITARTRATE AND ACETAMINOPHEN 10; 325 MG/1; MG/1
1 TABLET ORAL
Status: COMPLETED | OUTPATIENT
Start: 2020-10-07 | End: 2020-10-07

## 2020-10-07 RX ORDER — FUROSEMIDE 40 MG/1
40 TABLET ORAL DAILY
Status: DISCONTINUED | OUTPATIENT
Start: 2020-10-08 | End: 2020-10-08 | Stop reason: HOSPADM

## 2020-10-07 RX ORDER — CLOPIDOGREL BISULFATE 75 MG/1
75 TABLET ORAL DAILY
Status: DISCONTINUED | OUTPATIENT
Start: 2020-10-08 | End: 2020-10-08 | Stop reason: HOSPADM

## 2020-10-07 RX ORDER — FERROUS SULFATE 324(65)MG
325 TABLET, DELAYED RELEASE (ENTERIC COATED) ORAL NIGHTLY
Status: DISCONTINUED | OUTPATIENT
Start: 2020-10-08 | End: 2020-10-08

## 2020-10-07 RX ORDER — ISOSORBIDE MONONITRATE 30 MG/1
60 TABLET, EXTENDED RELEASE ORAL DAILY
Status: DISCONTINUED | OUTPATIENT
Start: 2020-10-08 | End: 2020-10-08 | Stop reason: HOSPADM

## 2020-10-07 RX ORDER — DONEPEZIL HYDROCHLORIDE 5 MG/1
10 TABLET, FILM COATED ORAL NIGHTLY
Status: DISCONTINUED | OUTPATIENT
Start: 2020-10-07 | End: 2020-10-08 | Stop reason: HOSPADM

## 2020-10-07 RX ORDER — METOPROLOL SUCCINATE 25 MG/1
50 TABLET, EXTENDED RELEASE ORAL DAILY
Status: DISCONTINUED | OUTPATIENT
Start: 2020-10-08 | End: 2020-10-08 | Stop reason: HOSPADM

## 2020-10-07 RX ORDER — MORPHINE SULFATE 4 MG/ML
4 INJECTION, SOLUTION INTRAMUSCULAR; INTRAVENOUS EVERY 4 HOURS PRN
Status: DISCONTINUED | OUTPATIENT
Start: 2020-10-07 | End: 2020-10-08 | Stop reason: HOSPADM

## 2020-10-07 RX ORDER — LISINOPRIL 2.5 MG/1
2.5 TABLET ORAL DAILY
Status: DISCONTINUED | OUTPATIENT
Start: 2020-10-08 | End: 2020-10-08 | Stop reason: HOSPADM

## 2020-10-07 RX ORDER — ACETAMINOPHEN 325 MG/1
650 TABLET ORAL EVERY 6 HOURS PRN
Status: DISCONTINUED | OUTPATIENT
Start: 2020-10-07 | End: 2020-10-08 | Stop reason: HOSPADM

## 2020-10-07 RX ORDER — PANTOPRAZOLE SODIUM 40 MG/1
40 TABLET, DELAYED RELEASE ORAL
Status: DISCONTINUED | OUTPATIENT
Start: 2020-10-08 | End: 2020-10-08 | Stop reason: HOSPADM

## 2020-10-07 RX ORDER — ACETAMINOPHEN 325 MG/1
650 TABLET ORAL EVERY 8 HOURS PRN
Status: DISCONTINUED | OUTPATIENT
Start: 2020-10-07 | End: 2020-10-08 | Stop reason: HOSPADM

## 2020-10-07 RX ORDER — HYDROCODONE BITARTRATE AND ACETAMINOPHEN 5; 325 MG/1; MG/1
1 TABLET ORAL EVERY 4 HOURS PRN
Status: DISCONTINUED | OUTPATIENT
Start: 2020-10-07 | End: 2020-10-08 | Stop reason: HOSPADM

## 2020-10-07 RX ORDER — ONDANSETRON 2 MG/ML
4 INJECTION INTRAMUSCULAR; INTRAVENOUS EVERY 8 HOURS PRN
Status: DISCONTINUED | OUTPATIENT
Start: 2020-10-07 | End: 2020-10-08 | Stop reason: HOSPADM

## 2020-10-07 RX ORDER — SODIUM CHLORIDE 0.9 % (FLUSH) 0.9 %
10 SYRINGE (ML) INJECTION
Status: DISCONTINUED | OUTPATIENT
Start: 2020-10-07 | End: 2020-10-08 | Stop reason: HOSPADM

## 2020-10-07 RX ORDER — ASPIRIN 81 MG/1
81 TABLET ORAL DAILY
Status: DISCONTINUED | OUTPATIENT
Start: 2020-10-08 | End: 2020-10-08 | Stop reason: HOSPADM

## 2020-10-07 RX ORDER — PRAVASTATIN SODIUM 40 MG/1
40 TABLET ORAL NIGHTLY
Status: DISCONTINUED | OUTPATIENT
Start: 2020-10-08 | End: 2020-10-08 | Stop reason: HOSPADM

## 2020-10-07 RX ADMIN — DONEPEZIL HYDROCHLORIDE 10 MG: 5 TABLET, FILM COATED ORAL at 11:10

## 2020-10-07 RX ADMIN — HYDROCODONE BITARTRATE AND ACETAMINOPHEN 1 TABLET: 10; 325 TABLET ORAL at 02:10

## 2020-10-07 RX ADMIN — MORPHINE SULFATE 4 MG: 4 INJECTION, SOLUTION INTRAMUSCULAR; INTRAVENOUS at 08:10

## 2020-10-07 RX ADMIN — PIPERACILLIN AND TAZOBACTAM 4.5 G: .5; 4 INJECTION, POWDER, LYOPHILIZED, FOR SOLUTION INTRAVENOUS at 09:10

## 2020-10-07 RX ADMIN — HYDROCODONE BITARTRATE AND ACETAMINOPHEN 1 TABLET: 5; 325 TABLET ORAL at 11:10

## 2020-10-07 NOTE — ED PROVIDER NOTES
Encounter Date: 10/7/2020       History     Chief Complaint   Patient presents with    Flank Pain     bilateral kidney pain. was discharged from hospital x 6 days ago for UTI. did not go home with antibiotics     70-year-old female presents with bilateral flank pain.  Patient states that she was discharged from the hospital 1 week ago by  and was treated for pneumonia and urinary tract infection.  She states that she continues to have back pain.  She denies having fever or dysuria.  She states these are the same symptoms        Review of patient's allergies indicates:   Allergen Reactions    Hydroxyzine hcl     Tizanidine      Past Medical History:   Diagnosis Date    Alzheimer disease     Alzheimer disease     Coronary artery disease     Hyperlipidemia     Hypertension     Myopathy     Non-ST elevation (NSTEMI) myocardial infarction     Obesity     Pneumonia     Sleep apnea      Past Surgical History:   Procedure Laterality Date    APPENDECTOMY      APPENDECTOMY      CHOLECYSTECTOMY      CORONARY STENT PLACEMENT      HYSTERECTOMY      TONSILLECTOMY       History reviewed. No pertinent family history.  Social History     Tobacco Use    Smoking status: Unknown If Ever Smoked   Substance Use Topics    Alcohol use: Not Currently    Drug use: Never     Review of Systems   Musculoskeletal: Positive for back pain.        Flank pain bilateral   All other systems reviewed and are negative.      Physical Exam     Initial Vitals [10/07/20 1338]   BP Pulse Resp Temp SpO2   (!) 171/71 65 20 98.1 °F (36.7 °C) (!) 94 %      MAP       --         Physical Exam    Nursing note and vitals reviewed.  Constitutional: She appears well-developed and well-nourished.   HENT:   Head: Atraumatic.   Mouth/Throat: Oropharynx is clear and moist.   Eyes: EOM are normal. Pupils are equal, round, and reactive to light.   Cardiovascular: Normal rate and regular rhythm.   Pulmonary/Chest: Breath sounds normal. No  respiratory distress.   Abdominal: Soft. There is no abdominal tenderness. There is no rebound and no guarding.   Neurological: She is alert and oriented to person, place, and time.   Or bilateral flank tenderness to palpation   Skin: Skin is warm and dry.         ED Course   Procedures  Labs Reviewed   CBC W/ AUTO DIFFERENTIAL - Abnormal; Notable for the following components:       Result Value    WBC 14.55 (*)     RBC 3.89 (*)     Hemoglobin 10.6 (*)     Hematocrit 35.4 (*)     Mean Corpuscular Hemoglobin Conc 29.9 (*)     RDW 15.9 (*)     Platelets 537 (*)     MPV 8.9 (*)     Gran # (ANC) 11.0 (*)     Immature Grans (Abs) 0.05 (*)     Mono # 1.5 (*)     Gran% 75.7 (*)     Lymph% 13.4 (*)     All other components within normal limits   COMPREHENSIVE METABOLIC PANEL - Abnormal; Notable for the following components:    CO2 35 (*)     Albumin 1.8 (*)     ALT 9 (*)     Anion Gap 4 (*)     All other components within normal limits   URINALYSIS, REFLEX TO URINE CULTURE - Abnormal; Notable for the following components:    Occult Blood UA Trace (*)     Leukocytes, UA Trace (*)     All other components within normal limits    Narrative:     Specimen Source->Urine   URINALYSIS MICROSCOPIC - Abnormal; Notable for the following components:    RBC, UA 14 (*)     Hyaline Casts, UA 5 (*)     All other components within normal limits    Narrative:     Specimen Source->Urine   LIPASE          Imaging Results          CT Renal Stone Study ABD Pelvis WO (Final result)  Result time 10/07/20 15:52:40    Final result by Sunny Swift MD (10/07/20 15:52:40)                 Impression:      Destructive changes at the endplates of the T9 and T10 bodies, new when compared to the chest CT from 08/09/2020 and suspicious for discitis.    Mild-to-moderate right hydronephrosis along with multiple calcifications in the vicinity of the distal right ureter, most of which likely reflect calcified phleboliths.  One of these calcifications could  represent a ureteral stone.    Colonic diverticulosis.    Small bilateral pleural effusions with adjacent atelectasis/infiltrates.    10 mm subpleural nodular density within the right middle pulmonary lobe.  This nodule is unchanged from the previous chest CTs.  A follow-up noncontrast chest CT may be obtained in 1 year for surveillance.    Additional observations as above.    Findings discussed with Dr. Oates at the time of this dictation.      Electronically signed by: Sunny Swift MD  Date:    10/07/2020  Time:    15:52             Narrative:    EXAMINATION:  CT RENAL STONE STUDY ABD PELVIS WO    CLINICAL HISTORY:  Flank pain, kidney stone suspected;    CT/nuclear cardiac exams in previous 12 months: 3    TECHNIQUE:  Axial CT images were obtained and evaluated with coronal reformatted images.  Iterative reconstruction technique was used.    COMPARISON:  Reference is made to the previous chest CTs.    FINDINGS:  Images through the lower chest demonstrate small bilateral pleural effusions with adjacent atelectasis/infiltrates.  A 10 mm subpleural nodular density at the lateral aspect of the right middle pulmonary lobe is unchanged when compared to a CTA chest from 07/10/2020.  There is a large hiatal hernia.  No abnormality seen of the nonenhanced liver, pancreas, spleen, adrenal glands or left kidney.  There is mild to moderate right hydronephrosis.  The distal right ureter is not well visualized.  Multiple ovoid calcifications are present in the vicinity of the distal right ureter and right UVJ, most of which likely reflect calcified phleboliths.  One of these calcifications could represent a ureteral stone.  No nephrolithiasis.  Gallbladder is surgically absent.  A diverticulum is suspected of the duodenum.  A moderate to large amount of fecal material is present within the rectum, suggesting fecal impaction.  There is colonic diverticulosis without definitive evidence of diverticulitis.  No evidence of bowel  obstruction or appendicitis.  Urinary bladder is moderately distended.  There is diffuse atherosclerotic change of the abdominal aorta.  Vertebral body endplate destruction at the T9-T10 level is new when compared to the CTA chest from 08/09/2020, suspicious for discitis.                               X-Ray Chest AP Portable (Final result)  Result time 10/07/20 15:55:08    Final result by Sunny Swift MD (10/07/20 15:55:08)                 Impression:      No definite acute pulmonary process.      Electronically signed by: Sunny Swift MD  Date:    10/07/2020  Time:    15:55             Narrative:    EXAMINATION:  XR CHEST AP PORTABLE    CLINICAL HISTORY:  Shortness of breath    COMPARISON:  09/23/2020    FINDINGS:  Cardiac silhouette appears mildly enlarged.  No obvious focal infiltrate or pleural effusion identified.  There is degenerative change of the spine and left shoulder.                                 Medical Decision Making:   ED Management:  Discussed with Dr Fortune and she requests transfer for biopsy.  Discussed with [facility] Dr. Dejesus, who states that this is not an emergent situation and the diagnosis is not conclusive on CT scan therefore he recommended admission for MRI and definitive diagnosis prior to transfer.  Discussed with Dr. jose winkler in and she will admit on antibiotics pain control and MRI and re-evaluate                             Clinical Impression:     ICD-10-CM ICD-9-CM   1. Discitis of thoracic region  M46.44 722.92   2. Back pain, unspecified back location, unspecified back pain laterality, unspecified chronicity  M54.9 724.5                          ED Disposition Condition    Admit                             Mayco Oates MD  10/07/20 1741

## 2020-10-08 ENCOUNTER — HOSPITAL ENCOUNTER (INPATIENT)
Facility: HOSPITAL | Age: 70
LOS: 12 days | Discharge: NURSING, REHAB OR LONG-TERM CARE | DRG: 478 | End: 2020-10-20
Attending: HOSPITALIST | Admitting: HOSPITALIST
Payer: MEDICARE

## 2020-10-08 VITALS
WEIGHT: 293 LBS | HEART RATE: 60 BPM | TEMPERATURE: 98 F | DIASTOLIC BLOOD PRESSURE: 67 MMHG | HEIGHT: 70 IN | BODY MASS INDEX: 41.95 KG/M2 | RESPIRATION RATE: 20 BRPM | OXYGEN SATURATION: 93 % | SYSTOLIC BLOOD PRESSURE: 132 MMHG

## 2020-10-08 DIAGNOSIS — R78.81 BACTEREMIA DUE TO STAPHYLOCOCCUS AUREUS: ICD-10-CM

## 2020-10-08 DIAGNOSIS — M46.40 DISCITIS: ICD-10-CM

## 2020-10-08 DIAGNOSIS — B95.62 MRSA BACTEREMIA: ICD-10-CM

## 2020-10-08 DIAGNOSIS — B95.61 BACTEREMIA DUE TO STAPHYLOCOCCUS AUREUS: ICD-10-CM

## 2020-10-08 DIAGNOSIS — M54.9 BACK PAIN, UNSPECIFIED BACK LOCATION, UNSPECIFIED BACK PAIN LATERALITY, UNSPECIFIED CHRONICITY: ICD-10-CM

## 2020-10-08 DIAGNOSIS — N13.39 OTHER HYDRONEPHROSIS: ICD-10-CM

## 2020-10-08 DIAGNOSIS — R78.81 MRSA BACTEREMIA: ICD-10-CM

## 2020-10-08 DIAGNOSIS — M46.44 DISCITIS OF THORACIC REGION: ICD-10-CM

## 2020-10-08 DIAGNOSIS — J90 PLEURAL EFFUSION: ICD-10-CM

## 2020-10-08 PROBLEM — I10 HTN (HYPERTENSION): Status: ACTIVE | Noted: 2020-10-08

## 2020-10-08 PROBLEM — I25.10 CAD (CORONARY ARTERY DISEASE): Status: ACTIVE | Noted: 2020-10-08

## 2020-10-08 LAB
ALBUMIN SERPL BCP-MCNC: 1.8 G/DL (ref 3.5–5.2)
ALP SERPL-CCNC: 93 U/L (ref 55–135)
ALT SERPL W/O P-5'-P-CCNC: 9 U/L (ref 10–44)
ANION GAP SERPL CALC-SCNC: 2 MMOL/L (ref 8–16)
AST SERPL-CCNC: 10 U/L (ref 10–40)
BASOPHILS # BLD AUTO: 0.03 K/UL (ref 0–0.2)
BASOPHILS NFR BLD: 0.2 % (ref 0–1.9)
BILIRUB SERPL-MCNC: 0.5 MG/DL (ref 0.1–1)
BUN SERPL-MCNC: 7 MG/DL (ref 8–23)
CALCIUM SERPL-MCNC: 9.3 MG/DL (ref 8.7–10.5)
CHLORIDE SERPL-SCNC: 99 MMOL/L (ref 95–110)
CO2 SERPL-SCNC: 35 MMOL/L (ref 23–29)
CREAT SERPL-MCNC: 0.5 MG/DL (ref 0.5–1.4)
DIFFERENTIAL METHOD: ABNORMAL
EOSINOPHIL # BLD AUTO: 0.1 K/UL (ref 0–0.5)
EOSINOPHIL NFR BLD: 0.4 % (ref 0–8)
ERYTHROCYTE [DISTWIDTH] IN BLOOD BY AUTOMATED COUNT: 15.7 % (ref 11.5–14.5)
EST. GFR  (AFRICAN AMERICAN): >60 ML/MIN/1.73 M^2
EST. GFR  (NON AFRICAN AMERICAN): >60 ML/MIN/1.73 M^2
GLUCOSE SERPL-MCNC: 94 MG/DL (ref 70–110)
HCT VFR BLD AUTO: 35.7 % (ref 37–48.5)
HGB BLD-MCNC: 10.6 G/DL (ref 12–16)
IMM GRANULOCYTES # BLD AUTO: 0.05 K/UL (ref 0–0.04)
IMM GRANULOCYTES NFR BLD AUTO: 0.4 % (ref 0–0.5)
LYMPHOCYTES # BLD AUTO: 1.4 K/UL (ref 1–4.8)
LYMPHOCYTES NFR BLD: 10.6 % (ref 18–48)
MCH RBC QN AUTO: 26.9 PG (ref 27–31)
MCHC RBC AUTO-ENTMCNC: 29.7 G/DL (ref 32–36)
MCV RBC AUTO: 91 FL (ref 82–98)
MONOCYTES # BLD AUTO: 1.6 K/UL (ref 0.3–1)
MONOCYTES NFR BLD: 12.1 % (ref 4–15)
NEUTROPHILS # BLD AUTO: 10.2 K/UL (ref 1.8–7.7)
NEUTROPHILS NFR BLD: 76.3 % (ref 38–73)
NRBC BLD-RTO: 0 /100 WBC
PLATELET # BLD AUTO: 527 K/UL (ref 150–350)
PMV BLD AUTO: 9 FL (ref 9.2–12.9)
POTASSIUM SERPL-SCNC: 3.1 MMOL/L (ref 3.5–5.1)
PROT SERPL-MCNC: 7 G/DL (ref 6–8.4)
RBC # BLD AUTO: 3.94 M/UL (ref 4–5.4)
SODIUM SERPL-SCNC: 136 MMOL/L (ref 136–145)
WBC # BLD AUTO: 13.44 K/UL (ref 3.9–12.7)

## 2020-10-08 PROCEDURE — 36415 COLL VENOUS BLD VENIPUNCTURE: CPT

## 2020-10-08 PROCEDURE — 63600175 PHARM REV CODE 636 W HCPCS: Performed by: INTERNAL MEDICINE

## 2020-10-08 PROCEDURE — 96366 THER/PROPH/DIAG IV INF ADDON: CPT | Performed by: EMERGENCY MEDICINE

## 2020-10-08 PROCEDURE — 80202 ASSAY OF VANCOMYCIN: CPT

## 2020-10-08 PROCEDURE — G0378 HOSPITAL OBSERVATION PER HR: HCPCS | Mod: OBSCO

## 2020-10-08 PROCEDURE — 63600175 PHARM REV CODE 636 W HCPCS

## 2020-10-08 PROCEDURE — 99900035 HC TECH TIME PER 15 MIN (STAT)

## 2020-10-08 PROCEDURE — 96376 TX/PRO/DX INJ SAME DRUG ADON: CPT | Performed by: EMERGENCY MEDICINE

## 2020-10-08 PROCEDURE — 99900031 HC PATIENT EDUCATION (STAT)

## 2020-10-08 PROCEDURE — 94761 N-INVAS EAR/PLS OXIMETRY MLT: CPT

## 2020-10-08 PROCEDURE — 99223 1ST HOSP IP/OBS HIGH 75: CPT | Mod: ,,, | Performed by: HOSPITALIST

## 2020-10-08 PROCEDURE — 87040 BLOOD CULTURE FOR BACTERIA: CPT | Mod: 59

## 2020-10-08 PROCEDURE — 25000003 PHARM REV CODE 250: Performed by: INTERNAL MEDICINE

## 2020-10-08 PROCEDURE — 80053 COMPREHEN METABOLIC PANEL: CPT

## 2020-10-08 PROCEDURE — 11000001 HC ACUTE MED/SURG PRIVATE ROOM

## 2020-10-08 PROCEDURE — 25000003 PHARM REV CODE 250

## 2020-10-08 PROCEDURE — 27000221 HC OXYGEN, UP TO 24 HOURS

## 2020-10-08 PROCEDURE — 63600175 PHARM REV CODE 636 W HCPCS: Performed by: EMERGENCY MEDICINE

## 2020-10-08 PROCEDURE — G0378 HOSPITAL OBSERVATION PER HR: HCPCS

## 2020-10-08 PROCEDURE — 96367 TX/PROPH/DG ADDL SEQ IV INF: CPT | Performed by: EMERGENCY MEDICINE

## 2020-10-08 PROCEDURE — 25000003 PHARM REV CODE 250: Performed by: EMERGENCY MEDICINE

## 2020-10-08 PROCEDURE — 85025 COMPLETE CBC W/AUTO DIFF WBC: CPT

## 2020-10-08 PROCEDURE — 99223 PR INITIAL HOSPITAL CARE,LEVL III: ICD-10-PCS | Mod: ,,, | Performed by: HOSPITALIST

## 2020-10-08 RX ORDER — HYDROCODONE BITARTRATE AND ACETAMINOPHEN 10; 325 MG/1; MG/1
1 TABLET ORAL EVERY 6 HOURS PRN
Status: DISCONTINUED | OUTPATIENT
Start: 2020-10-09 | End: 2020-10-20 | Stop reason: HOSPADM

## 2020-10-08 RX ORDER — ONDANSETRON 2 MG/ML
4 INJECTION INTRAMUSCULAR; INTRAVENOUS EVERY 8 HOURS PRN
Status: DISCONTINUED | OUTPATIENT
Start: 2020-10-08 | End: 2020-10-20 | Stop reason: HOSPADM

## 2020-10-08 RX ORDER — IBUPROFEN 200 MG
16 TABLET ORAL
Status: DISCONTINUED | OUTPATIENT
Start: 2020-10-08 | End: 2020-10-20 | Stop reason: HOSPADM

## 2020-10-08 RX ORDER — SODIUM CHLORIDE 0.9 % (FLUSH) 0.9 %
10 SYRINGE (ML) INJECTION
Status: DISCONTINUED | OUTPATIENT
Start: 2020-10-08 | End: 2020-10-20 | Stop reason: HOSPADM

## 2020-10-08 RX ORDER — IBUPROFEN 200 MG
24 TABLET ORAL
Status: DISCONTINUED | OUTPATIENT
Start: 2020-10-08 | End: 2020-10-20 | Stop reason: HOSPADM

## 2020-10-08 RX ORDER — DULOXETIN HYDROCHLORIDE 30 MG/1
60 CAPSULE, DELAYED RELEASE ORAL DAILY
Status: DISCONTINUED | OUTPATIENT
Start: 2020-10-09 | End: 2020-10-20 | Stop reason: HOSPADM

## 2020-10-08 RX ORDER — PROCHLORPERAZINE EDISYLATE 5 MG/ML
5 INJECTION INTRAMUSCULAR; INTRAVENOUS EVERY 6 HOURS PRN
Status: DISCONTINUED | OUTPATIENT
Start: 2020-10-08 | End: 2020-10-20 | Stop reason: HOSPADM

## 2020-10-08 RX ORDER — ENOXAPARIN SODIUM 100 MG/ML
40 INJECTION SUBCUTANEOUS EVERY 24 HOURS
Status: DISCONTINUED | OUTPATIENT
Start: 2020-10-09 | End: 2020-10-16

## 2020-10-08 RX ORDER — IPRATROPIUM BROMIDE AND ALBUTEROL SULFATE 2.5; .5 MG/3ML; MG/3ML
3 SOLUTION RESPIRATORY (INHALATION) EVERY 6 HOURS PRN
Status: DISCONTINUED | OUTPATIENT
Start: 2020-10-08 | End: 2020-10-20 | Stop reason: HOSPADM

## 2020-10-08 RX ORDER — ACETAMINOPHEN 325 MG/1
650 TABLET ORAL EVERY 4 HOURS PRN
Status: DISCONTINUED | OUTPATIENT
Start: 2020-10-08 | End: 2020-10-08

## 2020-10-08 RX ORDER — PRAVASTATIN SODIUM 40 MG/1
40 TABLET ORAL NIGHTLY
Status: DISCONTINUED | OUTPATIENT
Start: 2020-10-08 | End: 2020-10-20 | Stop reason: HOSPADM

## 2020-10-08 RX ORDER — LISINOPRIL 2.5 MG/1
2.5 TABLET ORAL DAILY
Status: DISCONTINUED | OUTPATIENT
Start: 2020-10-09 | End: 2020-10-20 | Stop reason: HOSPADM

## 2020-10-08 RX ORDER — FERROUS SULFATE 324(65)MG
324 TABLET, DELAYED RELEASE (ENTERIC COATED) ORAL NIGHTLY
Status: DISCONTINUED | OUTPATIENT
Start: 2020-10-08 | End: 2020-10-08

## 2020-10-08 RX ORDER — FERROUS SULFATE 325(65) MG
325 TABLET ORAL NIGHTLY
Status: DISCONTINUED | OUTPATIENT
Start: 2020-10-08 | End: 2020-10-08 | Stop reason: HOSPADM

## 2020-10-08 RX ORDER — PANTOPRAZOLE SODIUM 40 MG/1
40 TABLET, DELAYED RELEASE ORAL
Status: DISCONTINUED | OUTPATIENT
Start: 2020-10-09 | End: 2020-10-20 | Stop reason: HOSPADM

## 2020-10-08 RX ORDER — METOPROLOL SUCCINATE 50 MG/1
50 TABLET, EXTENDED RELEASE ORAL DAILY
Status: DISCONTINUED | OUTPATIENT
Start: 2020-10-09 | End: 2020-10-20 | Stop reason: HOSPADM

## 2020-10-08 RX ORDER — HYDROMORPHONE HYDROCHLORIDE 1 MG/ML
0.5 INJECTION, SOLUTION INTRAMUSCULAR; INTRAVENOUS; SUBCUTANEOUS EVERY 6 HOURS PRN
Status: DISCONTINUED | OUTPATIENT
Start: 2020-10-09 | End: 2020-10-20 | Stop reason: HOSPADM

## 2020-10-08 RX ORDER — ISOSORBIDE MONONITRATE 30 MG/1
60 TABLET, EXTENDED RELEASE ORAL DAILY
Status: DISCONTINUED | OUTPATIENT
Start: 2020-10-09 | End: 2020-10-20 | Stop reason: HOSPADM

## 2020-10-08 RX ORDER — FUROSEMIDE 40 MG/1
40 TABLET ORAL DAILY
Status: DISCONTINUED | OUTPATIENT
Start: 2020-10-09 | End: 2020-10-15

## 2020-10-08 RX ORDER — CLOPIDOGREL BISULFATE 75 MG/1
75 TABLET ORAL DAILY
Status: DISCONTINUED | OUTPATIENT
Start: 2020-10-09 | End: 2020-10-08

## 2020-10-08 RX ORDER — ASPIRIN 81 MG/1
81 TABLET ORAL DAILY
Status: DISCONTINUED | OUTPATIENT
Start: 2020-10-09 | End: 2020-10-20 | Stop reason: HOSPADM

## 2020-10-08 RX ORDER — TALC
6 POWDER (GRAM) TOPICAL NIGHTLY PRN
Status: DISCONTINUED | OUTPATIENT
Start: 2020-10-08 | End: 2020-10-20 | Stop reason: HOSPADM

## 2020-10-08 RX ORDER — DONEPEZIL HYDROCHLORIDE 10 MG/1
10 TABLET, FILM COATED ORAL NIGHTLY
Status: DISCONTINUED | OUTPATIENT
Start: 2020-10-08 | End: 2020-10-20 | Stop reason: HOSPADM

## 2020-10-08 RX ADMIN — VANCOMYCIN HYDROCHLORIDE 2000 MG: 1 INJECTION, POWDER, LYOPHILIZED, FOR SOLUTION INTRAVENOUS at 01:10

## 2020-10-08 RX ADMIN — MORPHINE SULFATE 4 MG: 4 INJECTION, SOLUTION INTRAMUSCULAR; INTRAVENOUS at 04:10

## 2020-10-08 RX ADMIN — FUROSEMIDE 40 MG: 40 TABLET ORAL at 09:10

## 2020-10-08 RX ADMIN — MEMANTINE HYDROCHLORIDE 28 MG: 7 CAPSULE, EXTENDED RELEASE ORAL at 09:10

## 2020-10-08 RX ADMIN — MORPHINE SULFATE 4 MG: 4 INJECTION, SOLUTION INTRAMUSCULAR; INTRAVENOUS at 10:10

## 2020-10-08 RX ADMIN — HYDROCODONE BITARTRATE AND ACETAMINOPHEN 1 TABLET: 5; 325 TABLET ORAL at 04:10

## 2020-10-08 RX ADMIN — PANTOPRAZOLE SODIUM 40 MG: 40 TABLET, DELAYED RELEASE ORAL at 07:10

## 2020-10-08 RX ADMIN — CLOPIDOGREL 75 MG: 75 TABLET, FILM COATED ORAL at 09:10

## 2020-10-08 RX ADMIN — DULOXETINE HYDROCHLORIDE 60 MG: 60 CAPSULE, DELAYED RELEASE ORAL at 09:10

## 2020-10-08 RX ADMIN — PIPERACILLIN AND TAZOBACTAM 4.5 G: .5; 4 INJECTION, POWDER, LYOPHILIZED, FOR SOLUTION INTRAVENOUS at 04:10

## 2020-10-08 RX ADMIN — PIPERACILLIN AND TAZOBACTAM 4.5 G: .5; 4 INJECTION, POWDER, LYOPHILIZED, FOR SOLUTION INTRAVENOUS at 01:10

## 2020-10-08 RX ADMIN — LISINOPRIL 2.5 MG: 2.5 TABLET ORAL at 09:10

## 2020-10-08 RX ADMIN — ISOSORBIDE MONONITRATE 60 MG: 30 TABLET, EXTENDED RELEASE ORAL at 09:10

## 2020-10-08 RX ADMIN — METOPROLOL SUCCINATE 50 MG: 25 TABLET, EXTENDED RELEASE ORAL at 09:10

## 2020-10-08 RX ADMIN — ASPIRIN 81 MG: 81 TABLET, COATED ORAL at 09:10

## 2020-10-08 RX ADMIN — VANCOMYCIN HYDROCHLORIDE 1250 MG: 1.25 INJECTION, POWDER, LYOPHILIZED, FOR SOLUTION INTRAVENOUS at 12:10

## 2020-10-08 RX ADMIN — VANCOMYCIN HYDROCHLORIDE 1.25 G: 1.25 INJECTION, POWDER, LYOPHILIZED, FOR SOLUTION INTRAVENOUS at 01:10

## 2020-10-08 RX ADMIN — MORPHINE SULFATE 4 MG: 4 INJECTION, SOLUTION INTRAMUSCULAR; INTRAVENOUS at 07:10

## 2020-10-08 NOTE — PROGRESS NOTES
Pharmacokinetic Initial Assessment: IV Vancomycin    Assessment/Plan:    Initiate intravenous vancomycin with loading dose of 2500 mg once followed by a maintenance dose of vancomycin 2000mg IV every 12 hours  Desired empiric serum trough concentration is 15 to 20 mcg/mL  Draw vancomycin trough level 60 min prior to fourth dose on 10/9/20 at approximately 1130  Pharmacy will continue to follow and monitor vancomycin.      Please contact pharmacy with any questions regarding this assessment.     Thank you for the consult,   Alia Kelley       Patient brief summary:  Martina Betancourt is a 70 y.o. female initiated on antimicrobial therapy with IV Vancomycin for treatment of suspected bone/joint infection    Drug Allergies:   Review of patient's allergies indicates:   Allergen Reactions    Hydroxyzine hcl     Tizanidine        Actual Body Weight:   137 kg    Renal Function:   Estimated Creatinine Clearance: 154 mL/min (based on SCr of 0.5 mg/dL).,       CBC (last 72 hours):  Recent Labs   Lab Result Units 10/07/20  1421   WBC K/uL 14.55*   Hemoglobin g/dL 10.6*   Hematocrit % 35.4*   Platelets K/uL 537*   Gran% % 75.7*   Lymph% % 13.4*   Mono% % 10.4   Eosinophil% % 0.1   Basophil% % 0.1   Differential Method  Automated       Metabolic Panel (last 72 hours):  Recent Labs   Lab Result Units 10/07/20  1421 10/07/20  1449   Sodium mmol/L 138  --    Potassium mmol/L 3.6  --    Chloride mmol/L 99  --    CO2 mmol/L 35*  --    Glucose mg/dL 96  --    Glucose, UA   --  Negative   BUN, Bld mg/dL 8  --    Creatinine mg/dL 0.5  --    Albumin g/dL 1.8*  --    Total Bilirubin mg/dL 0.4  --    Alkaline Phosphatase U/L 98  --    AST U/L 11  --    ALT U/L 9*  --        Drug levels (last 3 results):  No results for input(s): VANCOMYCINRA, VANCOMYCINPE, VANCOMYCINTR in the last 72 hours.    Microbiologic Results:  Microbiology Results (last 7 days)     ** No results found for the last 168 hours. **

## 2020-10-08 NOTE — HPI
Patient is a 70-year-old female with a history of recent hospitalization x2 including a Staph aureus bacteremia and then a subsequent pneumonia who at the last hospitalization about 1-2 weeks ago made a significant improvement with antibiotics return home with her daughter.  The patient reports she was doing well until about 24 hours ago began having excruciating flank pain.  In the ED she had imaging studies that showed a diskitis at the bottom thoracic spine levels and the concern of a possible epidural abscess/vertebral infection was entertained.  The case was discussed with an outside facility that had neurosurgical services and they felt that an MRI was needed before transfer.  She is scheduled for the transfer today.  The patient has some dementia and during her last few hospitalizations was placed do not resuscitate because of her deteriorating condition.  I will clarify this with the daughter today.

## 2020-10-08 NOTE — PLAN OF CARE
" (Physician in Lead of Transfers)   Outside Transfer Acceptance Note / Regional Referral Center    Transferring Physician: Alina SHARPE MD (), Ramona Downey NP    Accepting Physician: Anna Oconnell MD    Date of Acceptance: 10/08/2020    Transferring Facility: [facility]    Reason for Transfer:  Spinal OM/abscesses, needs neurosurgery consult    Report from Transferring Physician/Hospital course: 70F with Alzheimer's dz, HTN, HLD, CAD/stent(s), YOVANI, obesity, recent admit x2 for Staph aureus bacteremia and subsequent pneumonia (?and UTI), admitted to  at [facility] via the ED for excruciating flank pain x 24 hrs, CT renal stone study negative for renal stones but revealed diskitis.   Transfer center was called for transfer for spine services, and neurosurgery rec'd to obtain a dedicated MRI T spine prior to transfer.  MRI T spine (non-contrast) revealed "Evidence of discitis/osteomyelitis at T9-T10 with possible small associated abscesses within the adjacent soft tissues"  In terms of advanced directives, "The patient has some dementia and during her last few hospitalizations was placed do not resuscitate because of her deteriorating condition."    On Vanc and Zosyn since yesterday.  Neuro exam: "worsening B paraparesis" per verbal report in my discussion with referring team, and Physical exam in Epic note with "weakness present," "abnormal reflexes," "sensory deficit" .   I asked for details of the strength, reflexes, and other aspects of the neuro exam.  The hospitalist team there will update their H&P note to reflect the details so that we may appropriately trend this on serial exams.  Exam is challenging as she has dementia and is obese and weak at baseline (baseline unclear).      Dr Erickson, Neurosurgery staff, is aware and will consult.     VS: VSS, see Epic    Labs: WBC 13.4 (from 14.5).  See Epic.     Radiographs: see above    To Do List: IV ABx, ID and Neurosurgery consults    Upon patient arrival to floor, " please send SecureChat to [facility] or call extension [phone] (if no answer, this will flip to a beeper, so enter your call back number) for Hospital Medicine admit team assignment and for additional admit orders for the patient.  Do not page the attending physician associated with the patient on arrival (this physician may not be on duty at the time of arrival).  Rather, always call [phone] to reach the triage physician for orders and team assignment.    Anna Oconnell MD  Hospital Medicine Staff  Cell: [phone]

## 2020-10-08 NOTE — ASSESSMENT & PLAN NOTE
Strong suspicion for epidural abscess at the thoracic region.  The patient has a history of Staph aureus bacteremia and pneumonia.  Once MRI is completed will transfer for for neurosurgical care if necessary.  I have spoken to the patient's daughter.  She would likely do not resuscitate order continued which has been in place for the last few admissions.

## 2020-10-08 NOTE — HOSPITAL COURSE
10/08/2020 SD: MRI Thoracic Spine indicates Evidence of discitis/osteomyelitis at T9-T10 with possible small associated abscesses within the adjacent soft tissues; No significant central canal or foraminal stenosis identified of the thoracic spine. Transfer pt to [facility] for higher level of care (Neuro).

## 2020-10-08 NOTE — PLAN OF CARE
Contacted by JOYCE Shahid that patient needed transferring for neuro surgery services due to abnormal MRI.  CM contacted patient's daughter to inform her of the transfer and is in agreement to get patient for higher level of care.  CM initiated transfer.  Contacted [facility] to initiate.

## 2020-10-08 NOTE — ED NOTES
Incontinence cleaned and barrier cream applied. New bedding with pad applied. Patient moved to hospital bed from stretcher. Pure wick applied to suction. Patient tolerated well in no acute distress.

## 2020-10-08 NOTE — NURSING
PureWick changed.  Bedding and gown changed.  Taryn-area cleansed and re-applied cream provided by ER to redness.  It hurts pt more to turn to the right than the left; she screamed.

## 2020-10-08 NOTE — SUBJECTIVE & OBJECTIVE
Past Medical History:   Diagnosis Date    Alzheimer disease     Alzheimer disease     Coronary artery disease     Hyperlipidemia     Hypertension     Myopathy     Non-ST elevation (NSTEMI) myocardial infarction     Obesity     Pneumonia     Sleep apnea        Past Surgical History:   Procedure Laterality Date    APPENDECTOMY      APPENDECTOMY      CHOLECYSTECTOMY      CORONARY STENT PLACEMENT      HYSTERECTOMY      TONSILLECTOMY         Review of patient's allergies indicates:   Allergen Reactions    Hydroxyzine hcl     Tizanidine        No current facility-administered medications on file prior to encounter.      Current Outpatient Medications on File Prior to Encounter   Medication Sig    acetaminophen (TYLENOL) 325 MG tablet Take 2 tablets (650 mg total) by mouth every 6 (six) hours as needed (HEADACHE, TEMPERATURE - FEVER > 100.4).    albuterol-ipratropium (DUO-NEB) 2.5 mg-0.5 mg/3 mL nebulizer solution Take 3 mLs by nebulization every 6 (six) hours as needed for Wheezing or Shortness of Breath. Rescue    aspirin (ECOTRIN) 81 MG EC tablet Take 81 mg by mouth once daily.    clopidogreL (PLAVIX) 75 mg tablet Take 1 tablet (75 mg total) by mouth once daily.    donepeziL (ARICEPT) 10 MG tablet Take 10 mg by mouth every evening.    DULoxetine (CYMBALTA) 60 MG capsule Take 60 mg by mouth once daily.    ferrous sulfate 324 mg (65 mg iron) TbEC Take 325 mg by mouth every evening.    furosemide (LASIX) 40 MG tablet Take 1 tablet (40 mg total) by mouth once daily.    isosorbide mononitrate (IMDUR) 60 MG 24 hr tablet Take 60 mg by mouth once daily.    lisinopriL (PRINIVIL,ZESTRIL) 2.5 MG tablet Take 1 tablet (2.5 mg total) by mouth once daily.    memantine (NAMENDA XR) 28 mg CSpX Take 28 mg by mouth once daily.    metoprolol succinate (TOPROL-XL) 50 MG 24 hr tablet Take 50 mg by mouth once daily.    miconazole nitrate 2% (MICOTIN) 2 % Oint Apply topically 2 (two) times daily.    pantoprazole  (PROTONIX) 40 MG tablet Take 1 tablet (40 mg total) by mouth before breakfast.    pravastatin (PRAVACHOL) 40 MG tablet Take 40 mg by mouth every evening.     Family History     None        Tobacco Use    Smoking status: Unknown If Ever Smoked   Substance and Sexual Activity    Alcohol use: Not Currently    Drug use: Never    Sexual activity: Not Currently     Review of Systems   Constitutional: Positive for activity change, appetite change and fever. Negative for chills, diaphoresis, fatigue and unexpected weight change.   HENT: Negative for congestion, dental problem, ear discharge, ear pain, facial swelling, mouth sores, nosebleeds, rhinorrhea, sinus pain, sore throat, tinnitus, trouble swallowing and voice change.    Eyes: Negative for photophobia, pain, discharge, redness, itching and visual disturbance.   Respiratory: Positive for cough and shortness of breath. Negative for apnea, choking, chest tightness, wheezing and stridor.    Cardiovascular: Positive for leg swelling. Negative for chest pain and palpitations.   Gastrointestinal: Negative for abdominal distention, abdominal pain, anal bleeding, blood in stool, constipation, diarrhea, nausea, rectal pain and vomiting.   Endocrine: Negative for cold intolerance, heat intolerance, polydipsia, polyphagia and polyuria.   Genitourinary: Negative for difficulty urinating, dysuria, flank pain, frequency, genital sores, hematuria and urgency.   Musculoskeletal: Positive for back pain, gait problem and myalgias. Negative for arthralgias, joint swelling, neck pain and neck stiffness.   Skin: Negative for color change, rash and wound.   Allergic/Immunologic: Negative for environmental allergies, food allergies and immunocompromised state.   Neurological: Positive for weakness and numbness. Negative for dizziness, tremors, syncope, facial asymmetry, speech difficulty, light-headedness and headaches.   Hematological: Negative for adenopathy. Does not bruise/bleed  easily.   Psychiatric/Behavioral: Positive for confusion. Negative for agitation, decreased concentration, hallucinations, self-injury and suicidal ideas.     Objective:     Vital Signs (Most Recent):  Temp: 98 °F (36.7 °C) (10/08/20 0732)  Pulse: 69 (10/08/20 0732)  Resp: (!) 22 (10/08/20 0732)  BP: 134/65 (10/08/20 0732)  SpO2: (!) 93 % (10/08/20 0732) Vital Signs (24h Range):  Temp:  [97.9 °F (36.6 °C)-98.4 °F (36.9 °C)] 98 °F (36.7 °C)  Pulse:  [61-69] 69  Resp:  [18-22] 22  SpO2:  [90 %-97 %] 93 %  BP: (134-198)/(64-92) 134/65     Weight: (!) 137 kg (302 lb)  Body mass index is 43.33 kg/m².    Physical Exam  Constitutional:       General: She is awake. She is not in acute distress.     Appearance: Normal appearance. She is obese. She is ill-appearing. She is not toxic-appearing or diaphoretic.   HENT:      Head: Normocephalic and atraumatic.      Nose: Nose normal. No congestion or rhinorrhea.      Mouth/Throat:      Mouth: Mucous membranes are moist.      Pharynx: Oropharynx is clear. No oropharyngeal exudate or posterior oropharyngeal erythema.   Eyes:      General: No scleral icterus.        Right eye: No discharge.         Left eye: No discharge.      Extraocular Movements: Extraocular movements intact.      Pupils: Pupils are equal, round, and reactive to light.   Neck:      Musculoskeletal: Normal range of motion and neck supple. No neck rigidity or muscular tenderness.      Thyroid: No thyroid mass or thyromegaly.      Vascular: No carotid bruit.      Meningeal: Brudzinski's sign and Kernig's sign absent.   Cardiovascular:      Rate and Rhythm: Normal rate and regular rhythm.      Chest Wall: PMI is not displaced. No thrill.      Pulses: Normal pulses.      Heart sounds: Normal heart sounds. No murmur. No friction rub. No gallop.    Pulmonary:      Effort: Pulmonary effort is normal. No tachypnea, accessory muscle usage, prolonged expiration or respiratory distress.      Breath sounds: Normal breath  sounds. No stridor or decreased air movement. No wheezing, rhonchi or rales.   Chest:      Chest wall: No tenderness.   Abdominal:      General: Bowel sounds are normal. There is no distension.      Palpations: Abdomen is soft. There is no hepatomegaly, splenomegaly or mass.      Tenderness: There is no abdominal tenderness. There is no right CVA tenderness, left CVA tenderness, guarding or rebound.      Hernia: No hernia is present.   Musculoskeletal: Normal range of motion.         General: No swelling, tenderness, deformity or signs of injury.      Right lower leg: Edema present.      Left lower leg: Edema present.   Lymphadenopathy:      Cervical: No cervical adenopathy.   Skin:     General: Skin is warm.      Capillary Refill: Capillary refill takes 2 to 3 seconds.      Coloration: Skin is not cyanotic, jaundiced or pale.      Findings: No bruising, erythema, lesion, petechiae or rash.   Neurological:      Mental Status: She is alert. She is disoriented.      Cranial Nerves: No cranial nerve deficit, dysarthria or facial asymmetry.      Sensory: Sensory deficit present.      Motor: Weakness present. No tremor.      Coordination: Coordination abnormal.      Gait: Gait abnormal.      Deep Tendon Reflexes: Reflexes abnormal.   Psychiatric:         Mood and Affect: Mood normal. Mood is not anxious or depressed. Affect is not flat.         Speech: Speech is not rapid and pressured or slurred.         Behavior: Behavior normal. Behavior is not agitated, aggressive or combative.         Thought Content: Thought content normal. Thought content is not paranoid or delusional.         Cognition and Memory: Cognition is not impaired. Memory is not impaired.           CRANIAL NERVES     CN III, IV, VI   Pupils are equal, round, and reactive to light.       Significant Labs:   CBC:   Recent Labs   Lab 10/07/20  1421 10/08/20  0557   WBC 14.55* 13.44*   HGB 10.6* 10.6*   HCT 35.4* 35.7*   * 527*     CMP:   Recent Labs    Lab 10/07/20  1421 10/08/20  0557    136   K 3.6 3.1*   CL 99 99   CO2 35* 35*   GLU 96 94   BUN 8 7*   CREATININE 0.5 0.5   CALCIUM 9.5 9.3   PROT 7.1 7.0   ALBUMIN 1.8* 1.8*   BILITOT 0.4 0.5   ALKPHOS 98 93   AST 11 10   ALT 9* 9*   ANIONGAP 4* 2*   EGFRNONAA >60.0 >60.0       Significant Imaging: CT: I have reviewed all pertinent results/findings within the past 24 hours and my personal findings are:  Noted.

## 2020-10-08 NOTE — DISCHARGE SUMMARY
[facility] Medicine  Discharge Summary      Patient Name: Martina Betancourt  MRN: [id number]  Admission Date: 10/7/2020  Hospital Length of Stay: 0 days  Discharge Date and Time:  10/08/2020 5:03 PM  Attending Physician: Joe Fuller III, MD   Discharging Provider: Ramona Downey NP  Primary Care Provider: Joe Fuller Iii, MD      HPI:   Patient is a 70-year-old female with a history of recent hospitalization x2 including a Staph aureus bacteremia and then a subsequent pneumonia who at the last hospitalization about 1-2 weeks ago made a significant improvement with antibiotics return home with her daughter.  The patient reports she was doing well until about 24 hours ago began having excruciating flank pain.  In the ED she had imaging studies that showed a diskitis at the bottom thoracic spine levels and the concern of a possible epidural abscess/vertebral infection was entertained.  The case was discussed with an outside facility that had neurosurgical services and they felt that an MRI was needed before transfer.  She is scheduled for the transfer today.  The patient has some dementia and during her last few hospitalizations was placed do not resuscitate because of her deteriorating condition.  I will clarify this with the daughter today.    * No surgery found *      Hospital Course:   10/08/2020 SD: MRI Thoracic Spine indicates Evidence of discitis/osteomyelitis at T9-T10 with possible small associated abscesses within the adjacent soft tissues; No significant central canal or foraminal stenosis identified of the thoracic spine. Transfer pt to [facility] for higher level of care (Neuro).     Consults:   Consults (From admission, onward)        Status Ordering Provider     Inpatient consult to Internal Medicine  Once     Provider:  Joe Fuller III, MD    Acknowledged AARON RODRIGUEZ     Pharmacy to dose Vancomycin consult  Once     Provider:  (Not yet assigned)     Acknowledged AARON OATES          No new Assessment & Plan notes have been filed under this hospital service since the last note was generated.  Service: Hospital Medicine    Final Active Diagnoses:    Diagnosis Date Noted POA    PRINCIPAL PROBLEM:  Discitis of thoracic region [M46.44] 10/07/2020 Yes    MRSA bacteremia [R78.81, B95.62] 07/26/2020 Yes    Dementia without behavioral disturbance [F03.90] 07/26/2020 Yes    Closed high lateral malleolus fracture, right, with delayed healing, subsequent encounter [S82.61XG] 07/26/2020 Not Applicable    Do not resuscitate [Z66] 07/26/2020 Yes      Problems Resolved During this Admission:       Discharged Condition: stable    Disposition: Another Health Care Inst*    Follow Up:    Patient Instructions:   No discharge procedures on file.    Significant Diagnostic Studies: Labs: All labs within the past 24 hours have been reviewed    Pending Diagnostic Studies:     None         Medications:  Transfer Medications (for Discharge Readmit only):   Current Facility-Administered Medications   Medication Dose Route Frequency Provider Last Rate Last Dose    acetaminophen tablet 650 mg  650 mg Oral Q6H PRN Aaron Oates MD        acetaminophen tablet 650 mg  650 mg Oral Q8H PRN Aaron Oates MD        acetaminophen tablet 650 mg  650 mg Oral Q8H PRN Aaron Oates MD        albuterol-ipratropium 2.5 mg-0.5 mg/3 mL nebulizer solution 3 mL  3 mL Nebulization Q6H PRN Aaron Oates MD        aspirin EC tablet 81 mg  81 mg Oral Daily Aaron Oates MD   81 mg at 10/08/20 0923    clopidogreL tablet 75 mg  75 mg Oral Daily Aaron Oates MD   75 mg at 10/08/20 0925    donepeziL tablet 10 mg  10 mg Oral QHS Aaron Oates MD   10 mg at 10/07/20 2332    DULoxetine DR capsule 60 mg  60 mg Oral Daily Aaron Oates MD   60 mg at 10/08/20 0925    ferrous sulfate tablet 325 mg  325 mg Oral QHS Aaron Oates MD        furosemide tablet  40 mg  40 mg Oral Daily Mayco Oates MD   40 mg at 10/08/20 0923    HYDROcodone-acetaminophen 5-325 mg per tablet 1 tablet  1 tablet Oral Q4H PRN Mayco Oates MD   1 tablet at 10/08/20 1625    isosorbide mononitrate 24 hr tablet 60 mg  60 mg Oral Daily Mayco Oates MD   60 mg at 10/08/20 0924    lisinopriL tablet 2.5 mg  2.5 mg Oral Daily Mayco Oates MD   2.5 mg at 10/08/20 0953    memantine capsule CSpX 28 mg  28 mg Oral Daily Mayco Oates MD   28 mg at 10/08/20 0926    metoprolol succinate (TOPROL-XL) 24 hr tablet 50 mg  50 mg Oral Daily Mayco Oates MD   50 mg at 10/08/20 0923    morphine injection 4 mg  4 mg Intravenous Q4H PRN Mayco Oates MD   4 mg at 10/08/20 1049    ondansetron injection 4 mg  4 mg Intravenous Q8H PRN Mayco Oates MD        pantoprazole EC tablet 40 mg  40 mg Oral Before breakfast Mayco Oates MD   40 mg at 10/08/20 0720    piperacillin-tazobactam 4.5 g in dextrose 5 % 100 mL IVPB (ready to mix system)  4.5 g Intravenous Q8H Mayco Oates MD 25 mL/hr at 10/08/20 1310 4.5 g at 10/08/20 1310    pravastatin tablet 40 mg  40 mg Oral QHS Mayco Oates MD        sodium chloride 0.9% flush 10 mL  10 mL Intravenous PRN Mayco Oates MD        vancomycin (VANCOCIN) 2,000 mg in dextrose 5 % 500 mL IVPB  15 mg/kg Intravenous Q12H Joe H. Alina SHARPE MD   2,000 mg at 10/08/20 1310    vancomycin - pharmacy to dose   Intravenous pharmacy to manage frequency Mayco Oates MD           Indwelling Lines/Drains at time of discharge:   Lines/Drains/Airways     Drain            Female External Urinary Catheter 10/07/20 1700 1 day                Time spent on the discharge of patient: 30 minutes  Patient was seen and examined on the date of discharge and determined to be suitable for discharge.         Ramona Downey NP  [facility]  [facility]

## 2020-10-08 NOTE — PLAN OF CARE
10/08/20 1203   Discharge Assessment   Assessment Type Discharge Planning Assessment   Confirmed/corrected address and phone number on facesheet? Yes   Assessment information obtained from? Caregiver   Expected Length of Stay (days) 1   Communicated expected length of stay with patient/caregiver yes   Prior to hospitilization cognitive status: Alert/Oriented   Prior to hospitalization functional status: Partially Dependent  (Patient only able to feed self at this time.)   Current cognitive status: Alert/Oriented   Current Functional Status: Partially Dependent   Lives With child([place]), adult   Able to Return to Prior Arrangements other (see comments)  (Patient transferring to higher level of care.)   Is patient able to care for self after discharge? No   Patient's perception of discharge disposition [facility]   Equipment Currently Used at Home bedside commode;CPAP;grab bar;hospital bed;oxygen;wheelchair;walker, rolling   Do you have any problems affording any of your prescribed medications? No   Is the patient taking medications as prescribed? yes   Does the patient have transportation home? Yes   Transportation Anticipated family or friend will provide   Discharge Plan A Home with family;Home Health   Discharge Plan B Home with family;Home Health   DME Needed Upon Discharge  none   Patient/Family in Agreement with Plan yes     Patient recently hospitalized and discharged home w/home health services from [facility].  Patient has all required DME at home at this time.  Patient possibly transferring for higher level of care.  Awaiting MRI results.

## 2020-10-08 NOTE — PLAN OF CARE
10/08/20 1724   Discharge Assessment   Assessment Type Final Discharge Note   Discharge Plan A Other  (Patient transferred for higher level of care.)   Discharge Plan B Other   DME Needed Upon Discharge  lift device     Patient transferred to [facility] for higher level of care.

## 2020-10-08 NOTE — PLAN OF CARE
10/08/20 1501   PALMER Message   Date PALMER was signed 10/08/20   Time PALMER was signed 1448      PALMER obtained.

## 2020-10-08 NOTE — H&P
[facility] Medicine  History & Physical    Patient Name: Martina Betancourt  MRN: [id number]  Admission Date: 10/7/2020  Attending Physician: Joe Fuller III, MD   Primary Care Provider: Joe Fuller Iii, MD         Patient information was obtained from patient, relative(s), past medical records and ER records.     Subjective:     Principal Problem:Discitis of thoracic region    Chief Complaint:   Chief Complaint   Patient presents with    Flank Pain     bilateral kidney pain. was discharged from hospital x 6 days ago for UTI. did not go home with antibiotics        HPI: Patient is a 70-year-old female with a history of recent hospitalization x2 including a Staph aureus bacteremia and then a subsequent pneumonia who at the last hospitalization about 1-2 weeks ago made a significant improvement with antibiotics return home with her daughter.  The patient reports she was doing well until about 24 hours ago began having excruciating flank pain.  In the ED she had imaging studies that showed a diskitis at the bottom thoracic spine levels and the concern of a possible epidural abscess/vertebral infection was entertained.  The case was discussed with an outside facility that had neurosurgical services and they felt that an MRI was needed before transfer.  She is scheduled for the transfer today.  The patient has some dementia and during her last few hospitalizations was placed do not resuscitate because of her deteriorating condition.  I will clarify this with the daughter today.    Past Medical History:   Diagnosis Date    Alzheimer disease     Alzheimer disease     Coronary artery disease     Hyperlipidemia     Hypertension     Myopathy     Non-ST elevation (NSTEMI) myocardial infarction     Obesity     Pneumonia     Sleep apnea        Past Surgical History:   Procedure Laterality Date    APPENDECTOMY      APPENDECTOMY      CHOLECYSTECTOMY      CORONARY STENT PLACEMENT       HYSTERECTOMY      TONSILLECTOMY         Review of patient's allergies indicates:   Allergen Reactions    Hydroxyzine hcl     Tizanidine        No current facility-administered medications on file prior to encounter.      Current Outpatient Medications on File Prior to Encounter   Medication Sig    acetaminophen (TYLENOL) 325 MG tablet Take 2 tablets (650 mg total) by mouth every 6 (six) hours as needed (HEADACHE, TEMPERATURE - FEVER > 100.4).    albuterol-ipratropium (DUO-NEB) 2.5 mg-0.5 mg/3 mL nebulizer solution Take 3 mLs by nebulization every 6 (six) hours as needed for Wheezing or Shortness of Breath. Rescue    aspirin (ECOTRIN) 81 MG EC tablet Take 81 mg by mouth once daily.    clopidogreL (PLAVIX) 75 mg tablet Take 1 tablet (75 mg total) by mouth once daily.    donepeziL (ARICEPT) 10 MG tablet Take 10 mg by mouth every evening.    DULoxetine (CYMBALTA) 60 MG capsule Take 60 mg by mouth once daily.    ferrous sulfate 324 mg (65 mg iron) TbEC Take 325 mg by mouth every evening.    furosemide (LASIX) 40 MG tablet Take 1 tablet (40 mg total) by mouth once daily.    isosorbide mononitrate (IMDUR) 60 MG 24 hr tablet Take 60 mg by mouth once daily.    lisinopriL (PRINIVIL,ZESTRIL) 2.5 MG tablet Take 1 tablet (2.5 mg total) by mouth once daily.    memantine (NAMENDA XR) 28 mg CSpX Take 28 mg by mouth once daily.    metoprolol succinate (TOPROL-XL) 50 MG 24 hr tablet Take 50 mg by mouth once daily.    miconazole nitrate 2% (MICOTIN) 2 % Oint Apply topically 2 (two) times daily.    pantoprazole (PROTONIX) 40 MG tablet Take 1 tablet (40 mg total) by mouth before breakfast.    pravastatin (PRAVACHOL) 40 MG tablet Take 40 mg by mouth every evening.     Family History     None        Tobacco Use    Smoking status: Unknown If Ever Smoked   Substance and Sexual Activity    Alcohol use: Not Currently    Drug use: Never    Sexual activity: Not Currently     Review of Systems   Constitutional: Positive for  activity change, appetite change and fever. Negative for chills, diaphoresis, fatigue and unexpected weight change.   HENT: Negative for congestion, dental problem, ear discharge, ear pain, facial swelling, mouth sores, nosebleeds, rhinorrhea, sinus pain, sore throat, tinnitus, trouble swallowing and voice change.    Eyes: Negative for photophobia, pain, discharge, redness, itching and visual disturbance.   Respiratory: Positive for cough and shortness of breath. Negative for apnea, choking, chest tightness, wheezing and stridor.    Cardiovascular: Positive for leg swelling. Negative for chest pain and palpitations.   Gastrointestinal: Negative for abdominal distention, abdominal pain, anal bleeding, blood in stool, constipation, diarrhea, nausea, rectal pain and vomiting.   Endocrine: Negative for cold intolerance, heat intolerance, polydipsia, polyphagia and polyuria.   Genitourinary: Negative for difficulty urinating, dysuria, flank pain, frequency, genital sores, hematuria and urgency.   Musculoskeletal: Positive for back pain, gait problem and myalgias. Negative for arthralgias, joint swelling, neck pain and neck stiffness.   Skin: Negative for color change, rash and wound.   Allergic/Immunologic: Negative for environmental allergies, food allergies and immunocompromised state.   Neurological: Positive for weakness and numbness. Negative for dizziness, tremors, syncope, facial asymmetry, speech difficulty, light-headedness and headaches.   Hematological: Negative for adenopathy. Does not bruise/bleed easily.   Psychiatric/Behavioral: Positive for confusion. Negative for agitation, decreased concentration, hallucinations, self-injury and suicidal ideas.     Objective:     Vital Signs (Most Recent):  Temp: 98 °F (36.7 °C) (10/08/20 0732)  Pulse: 69 (10/08/20 0732)  Resp: (!) 22 (10/08/20 0732)  BP: 134/65 (10/08/20 0732)  SpO2: (!) 93 % (10/08/20 0732) Vital Signs (24h Range):  Temp:  [97.9 °F (36.6 °C)-98.4 °F  (36.9 °C)] 98 °F (36.7 °C)  Pulse:  [61-69] 69  Resp:  [18-22] 22  SpO2:  [90 %-97 %] 93 %  BP: (134-198)/(64-92) 134/65     Weight: (!) 137 kg (302 lb)  Body mass index is 43.33 kg/m².    Physical Exam  Constitutional:       General: She is awake. She is not in acute distress.     Appearance: Normal appearance. She is obese. She is ill-appearing. She is not toxic-appearing or diaphoretic.   HENT:      Head: Normocephalic and atraumatic.      Nose: Nose normal. No congestion or rhinorrhea.      Mouth/Throat:      Mouth: Mucous membranes are moist.      Pharynx: Oropharynx is clear. No oropharyngeal exudate or posterior oropharyngeal erythema.   Eyes:      General: No scleral icterus.        Right eye: No discharge.         Left eye: No discharge.      Extraocular Movements: Extraocular movements intact.      Pupils: Pupils are equal, round, and reactive to light.   Neck:      Musculoskeletal: Normal range of motion and neck supple. No neck rigidity or muscular tenderness.      Thyroid: No thyroid mass or thyromegaly.      Vascular: No carotid bruit.      Meningeal: Brudzinski's sign and Kernig's sign absent.   Cardiovascular:      Rate and Rhythm: Normal rate and regular rhythm.      Chest Wall: PMI is not displaced. No thrill.      Pulses: Normal pulses.      Heart sounds: Normal heart sounds. No murmur. No friction rub. No gallop.    Pulmonary:      Effort: Pulmonary effort is normal. No tachypnea, accessory muscle usage, prolonged expiration or respiratory distress.      Breath sounds: Normal breath sounds. No stridor or decreased air movement. No wheezing, rhonchi or rales.   Chest:      Chest wall: No tenderness.   Abdominal:      General: Bowel sounds are normal. There is no distension.      Palpations: Abdomen is soft. There is no hepatomegaly, splenomegaly or mass.      Tenderness: There is no abdominal tenderness. There is no right CVA tenderness, left CVA tenderness, guarding or rebound.      Hernia: No  hernia is present.   Musculoskeletal: Normal range of motion.         General: No swelling, tenderness, deformity or signs of injury.      Right lower leg: Edema present.      Left lower leg: Edema present.   Lymphadenopathy:      Cervical: No cervical adenopathy.   Skin:     General: Skin is warm.      Capillary Refill: Capillary refill takes 2 to 3 seconds.      Coloration: Skin is not cyanotic, jaundiced or pale.      Findings: No bruising, erythema, lesion, petechiae or rash.   Neurological:      Mental Status: She is alert. She is disoriented.      Cranial Nerves: No cranial nerve deficit, dysarthria or facial asymmetry.      Sensory: Sensory deficit present.      Motor: Weakness present. No tremor.      Coordination: Coordination abnormal.      Gait: Gait abnormal.      Deep Tendon Reflexes: Reflexes abnormal.   Psychiatric:         Mood and Affect: Mood normal. Mood is not anxious or depressed. Affect is not flat.         Speech: Speech is not rapid and pressured or slurred.         Behavior: Behavior normal. Behavior is not agitated, aggressive or combative.         Thought Content: Thought content normal. Thought content is not paranoid or delusional.         Cognition and Memory: Cognition is not impaired. Memory is not impaired.           CRANIAL NERVES     CN III, IV, VI   Pupils are equal, round, and reactive to light.       Significant Labs:   CBC:   Recent Labs   Lab 10/07/20  1421 10/08/20  0557   WBC 14.55* 13.44*   HGB 10.6* 10.6*   HCT 35.4* 35.7*   * 527*     CMP:   Recent Labs   Lab 10/07/20  1421 10/08/20  0557    136   K 3.6 3.1*   CL 99 99   CO2 35* 35*   GLU 96 94   BUN 8 7*   CREATININE 0.5 0.5   CALCIUM 9.5 9.3   PROT 7.1 7.0   ALBUMIN 1.8* 1.8*   BILITOT 0.4 0.5   ALKPHOS 98 93   AST 11 10   ALT 9* 9*   ANIONGAP 4* 2*   EGFRNONAA >60.0 >60.0       Significant Imaging: CT: I have reviewed all pertinent results/findings within the past 24 hours and my personal findings are:   Noted.    Assessment/Plan:     * Discitis of thoracic region  Strong suspicion for epidural abscess at the thoracic region.  The patient has a history of Staph aureus bacteremia and pneumonia.  Once MRI is completed will transfer for for neurosurgical care if necessary.  I have spoken to the patient's daughter.  She would likely do not resuscitate order continued which has been in place for the last few admissions.      Do not resuscitate        Closed high lateral malleolus fracture, right, with delayed healing, subsequent encounter  Continue pain control      Dementia without behavioral disturbance  Continue current care      MRSA bacteremia  Prior during her prior admissions so will carry over to this diagnoses for now        VTE Risk Mitigation (From admission, onward)         Ordered     IP VTE HIGH RISK PATIENT  Once      10/07/20 2249     Place sequential compression device  Until discontinued      10/07/20 2249                   Joe Fuller Iii, MD  Department of Hospital Medicine   [facility]

## 2020-10-09 PROBLEM — N13.30 HYDRONEPHROSIS: Status: ACTIVE | Noted: 2020-10-09

## 2020-10-09 LAB
ALBUMIN SERPL BCP-MCNC: 2 G/DL (ref 3.5–5.2)
ALP SERPL-CCNC: 101 U/L (ref 55–135)
ALT SERPL W/O P-5'-P-CCNC: 8 U/L (ref 10–44)
ANION GAP SERPL CALC-SCNC: 11 MMOL/L (ref 8–16)
AST SERPL-CCNC: 14 U/L (ref 10–40)
BASOPHILS # BLD AUTO: 0.03 K/UL (ref 0–0.2)
BASOPHILS NFR BLD: 0.2 % (ref 0–1.9)
BILIRUB SERPL-MCNC: 0.5 MG/DL (ref 0.1–1)
BUN SERPL-MCNC: 9 MG/DL (ref 8–23)
CALCIUM SERPL-MCNC: 9.7 MG/DL (ref 8.7–10.5)
CHLORIDE SERPL-SCNC: 98 MMOL/L (ref 95–110)
CO2 SERPL-SCNC: 33 MMOL/L (ref 23–29)
CREAT SERPL-MCNC: 0.7 MG/DL (ref 0.5–1.4)
CRP SERPL-MCNC: 139.5 MG/L (ref 0–8.2)
DIFFERENTIAL METHOD: ABNORMAL
EOSINOPHIL # BLD AUTO: 0.1 K/UL (ref 0–0.5)
EOSINOPHIL NFR BLD: 0.5 % (ref 0–8)
ERYTHROCYTE [DISTWIDTH] IN BLOOD BY AUTOMATED COUNT: 15.9 % (ref 11.5–14.5)
EST. GFR  (AFRICAN AMERICAN): >60 ML/MIN/1.73 M^2
EST. GFR  (NON AFRICAN AMERICAN): >60 ML/MIN/1.73 M^2
GLUCOSE SERPL-MCNC: 95 MG/DL (ref 70–110)
HCT VFR BLD AUTO: 37.5 % (ref 37–48.5)
HGB BLD-MCNC: 11 G/DL (ref 12–16)
IMM GRANULOCYTES # BLD AUTO: 0.04 K/UL (ref 0–0.04)
IMM GRANULOCYTES NFR BLD AUTO: 0.3 % (ref 0–0.5)
LYMPHOCYTES # BLD AUTO: 2 K/UL (ref 1–4.8)
LYMPHOCYTES NFR BLD: 15 % (ref 18–48)
MCH RBC QN AUTO: 27.4 PG (ref 27–31)
MCHC RBC AUTO-ENTMCNC: 29.3 G/DL (ref 32–36)
MCV RBC AUTO: 93 FL (ref 82–98)
MONOCYTES # BLD AUTO: 1.7 K/UL (ref 0.3–1)
MONOCYTES NFR BLD: 12.5 % (ref 4–15)
NEUTROPHILS # BLD AUTO: 9.5 K/UL (ref 1.8–7.7)
NEUTROPHILS NFR BLD: 71.5 % (ref 38–73)
NRBC BLD-RTO: 0 /100 WBC
PLATELET # BLD AUTO: 510 K/UL (ref 150–350)
PMV BLD AUTO: 9.3 FL (ref 9.2–12.9)
POTASSIUM SERPL-SCNC: 3.6 MMOL/L (ref 3.5–5.1)
PROT SERPL-MCNC: 7 G/DL (ref 6–8.4)
RBC # BLD AUTO: 4.02 M/UL (ref 4–5.4)
SODIUM SERPL-SCNC: 142 MMOL/L (ref 136–145)
VANCOMYCIN SERPL-MCNC: 22.9 UG/ML
WBC # BLD AUTO: 13.25 K/UL (ref 3.9–12.7)

## 2020-10-09 PROCEDURE — 99223 PR INITIAL HOSPITAL CARE,LEVL III: ICD-10-PCS | Mod: ,,, | Performed by: PHYSICIAN ASSISTANT

## 2020-10-09 PROCEDURE — 36415 COLL VENOUS BLD VENIPUNCTURE: CPT

## 2020-10-09 PROCEDURE — 99232 PR SUBSEQUENT HOSPITAL CARE,LEVL II: ICD-10-PCS | Mod: ,,, | Performed by: INTERNAL MEDICINE

## 2020-10-09 PROCEDURE — 97161 PT EVAL LOW COMPLEX 20 MIN: CPT

## 2020-10-09 PROCEDURE — 86140 C-REACTIVE PROTEIN: CPT

## 2020-10-09 PROCEDURE — 25000003 PHARM REV CODE 250: Performed by: INTERNAL MEDICINE

## 2020-10-09 PROCEDURE — 25000003 PHARM REV CODE 250: Performed by: HOSPITALIST

## 2020-10-09 PROCEDURE — 85025 COMPLETE CBC W/AUTO DIFF WBC: CPT

## 2020-10-09 PROCEDURE — 99223 1ST HOSP IP/OBS HIGH 75: CPT | Mod: ,,, | Performed by: PHYSICIAN ASSISTANT

## 2020-10-09 PROCEDURE — 63600175 PHARM REV CODE 636 W HCPCS: Performed by: HOSPITALIST

## 2020-10-09 PROCEDURE — 99232 SBSQ HOSP IP/OBS MODERATE 35: CPT | Mod: ,,, | Performed by: INTERNAL MEDICINE

## 2020-10-09 PROCEDURE — 11000001 HC ACUTE MED/SURG PRIVATE ROOM

## 2020-10-09 PROCEDURE — 80053 COMPREHEN METABOLIC PANEL: CPT

## 2020-10-09 PROCEDURE — 63600175 PHARM REV CODE 636 W HCPCS: Performed by: INTERNAL MEDICINE

## 2020-10-09 PROCEDURE — 97530 THERAPEUTIC ACTIVITIES: CPT

## 2020-10-09 PROCEDURE — 63600175 PHARM REV CODE 636 W HCPCS: Performed by: PHYSICIAN ASSISTANT

## 2020-10-09 PROCEDURE — 97802 MEDICAL NUTRITION INDIV IN: CPT

## 2020-10-09 RX ADMIN — FUROSEMIDE 40 MG: 40 TABLET ORAL at 10:10

## 2020-10-09 RX ADMIN — HYDROCODONE BITARTRATE AND ACETAMINOPHEN 1 TABLET: 10; 325 TABLET ORAL at 01:10

## 2020-10-09 RX ADMIN — ISOSORBIDE MONONITRATE 60 MG: 30 TABLET, EXTENDED RELEASE ORAL at 01:10

## 2020-10-09 RX ADMIN — PIPERACILLIN SODIUM AND TAZOBACTAM SODIUM 4.5 G: 4; .5 INJECTION, POWDER, LYOPHILIZED, FOR SOLUTION INTRAVENOUS at 01:10

## 2020-10-09 RX ADMIN — DONEPEZIL HYDROCHLORIDE 10 MG: 10 TABLET ORAL at 09:10

## 2020-10-09 RX ADMIN — CEFTRIAXONE SODIUM 2 G: 2 INJECTION, SOLUTION INTRAVENOUS at 08:10

## 2020-10-09 RX ADMIN — PIPERACILLIN SODIUM AND TAZOBACTAM SODIUM 4.5 G: 4; .5 INJECTION, POWDER, LYOPHILIZED, FOR SOLUTION INTRAVENOUS at 05:10

## 2020-10-09 RX ADMIN — PRAVASTATIN SODIUM 40 MG: 40 TABLET ORAL at 09:10

## 2020-10-09 RX ADMIN — ASPIRIN 81 MG: 81 TABLET, COATED ORAL at 10:10

## 2020-10-09 RX ADMIN — PRAVASTATIN SODIUM 40 MG: 40 TABLET ORAL at 01:10

## 2020-10-09 RX ADMIN — LISINOPRIL 2.5 MG: 2.5 TABLET ORAL at 10:10

## 2020-10-09 RX ADMIN — HYDROCODONE BITARTRATE AND ACETAMINOPHEN 1 TABLET: 10; 325 TABLET ORAL at 10:10

## 2020-10-09 RX ADMIN — METOPROLOL SUCCINATE 50 MG: 50 TABLET, EXTENDED RELEASE ORAL at 10:10

## 2020-10-09 RX ADMIN — DONEPEZIL HYDROCHLORIDE 10 MG: 10 TABLET ORAL at 01:10

## 2020-10-09 RX ADMIN — DULOXETINE HYDROCHLORIDE 60 MG: 30 CAPSULE, DELAYED RELEASE ORAL at 10:10

## 2020-10-09 RX ADMIN — VANCOMYCIN HYDROCHLORIDE 2500 MG: 1 INJECTION, POWDER, LYOPHILIZED, FOR SOLUTION INTRAVENOUS at 05:10

## 2020-10-09 RX ADMIN — PIPERACILLIN SODIUM AND TAZOBACTAM SODIUM 4.5 G: 4; .5 INJECTION, POWDER, LYOPHILIZED, FOR SOLUTION INTRAVENOUS at 11:10

## 2020-10-09 RX ADMIN — ENOXAPARIN SODIUM 40 MG: 40 INJECTION SUBCUTANEOUS at 05:10

## 2020-10-09 NOTE — H&P
"Hospital Medicine  History and Physical Exam    Team: [facility] A Morales Garzon MD  Admit Date: 10/8/2020  Principal Problem:  Discitis   Patient information was obtained from patient, past medical records and ER records.   Primary care Physician: Joe Fuller Iii, MD  Code status: DNR    HPI: 70F with Alzheimer's dz, HTN, HLD, CAD, YOVANI, obesity, recent admit x2 for MRSA bacteremia and subsequent pneumonia (?and UTI), admitted to  at [facility] via the ED for excruciating flank pain x 24 hrs, CT renal stone study negative for renal stones but revealed diskitis.   Transfer center was called for transfer for spine services, and neurosurgery rec'd to obtain a dedicated MRI T spine prior to transfer.  MRI T spine (non-contrast) revealed "Evidence of discitis/osteomyelitis at T9-T10 with possible small associated abscesses within the adjacent soft tissues"  In terms of advanced directives, "The patient has some dementia and during her last few hospitalizations was placed do not resuscitate because of her deteriorating condition." Of note, after most recent admission with MRSA bacteremia, pt. Was treated with IV linezolid for 8 days which was stopped at discharge.      On Vanc and Zosyn since yesterday.  Neuro exam: "worsening B paraparesis" per verbal report in my discussion with referring team, and Physical exam in Epic note with "weakness present," "abnormal reflexes," "sensory deficit" .   I asked for details of the strength, reflexes, and other aspects of the neuro exam.  The hospitalist team there will update their H&P note to reflect the details so that we may appropriately trend this on serial exams.  Exam is challenging as she has dementia and is obese and weak at baseline (baseline unclear).      On my assessment, the patient is alert and oriented. She reports that she has chronic back pain at baseline, but she developed significant worsening of her pain on the night prior to presentation (10/6). She states " that the pain has been significant ever since and only sometimes controlled by pain medications. She denies any other associated symptoms such as fevers, chills, nausea, vomiting, malaise, cough, or focal weakness/numbness.       No results found for: HGBA1C    Past Medical History: Patient has a past medical history of Alzheimer disease, Alzheimer disease, Coronary artery disease, Hyperlipidemia, Hypertension, Myopathy, Non-ST elevation (NSTEMI) myocardial infarction, Obesity, Pneumonia, and Sleep apnea.    Past Surgical History: Patient has a past surgical history that includes Appendectomy; Cholecystectomy; Appendectomy; Hysterectomy; Coronary stent placement; and Tonsillectomy.    Social History: Patient reports previous alcohol use. She reports that she does not use drugs.    Family History: family history is not on file.    Medications: reviewed     Allergies: Patient is allergic to hydroxyzine hcl and tizanidine.    ROS  Pain Scale: 6 /10   Constitutional: no fever or chills  Respiratory: no cough or shortness of breath  Cardiovascular: no chest pain or palpitations  Gastrointestinal: no nausea or vomiting, no abdominal pain or change in bowel habits  Genitourinary: no hematuria or dysuria  Integument/Breast: no rash or pruritis  Hematologic/Lymphatic: no easy bruising or lymphadenopathy  Musculoskeletal: Positive for back pain  Neurological: no seizures or tremors  Behavioral/Psych: no depression or anxiety    PEx  Temp:  [97.7 °F (36.5 °C)-98.4 °F (36.9 °C)]   Pulse:  [60-69]   Resp:  [18-22]   BP: (129-153)/(64-83)   SpO2:  [90 %-96 %]   There is no height or weight on file to calculate BMI.   No intake or output data in the 24 hours ending 10/08/20 2227    General appearance: no distress, pt. resting comfortably  Mental status: Alert and oriented x 3  HEENT:  conjunctivae/corneas clear, PERRL  Neck: supple, thyroid not enlarged  Pulm:   normal respiratory effort, CTA B, no c/w/r  Card: RRR, S1, S2 normal,  no murmur, click, rub or gallop  Abd: soft, NT, ND, BS present; no masses, no organomegaly  Ext: no c/c/e  Pulses: 2+, symmetric  Skin: color, texture, turgor normal. No rashes or lesions  Neuro: CN II-XII grossly intact, no focal numbness or weakness, normal strength and tone     Recent Results (from the past 24 hour(s))   CBC auto differential    Collection Time: 10/08/20  5:57 AM   Result Value Ref Range    WBC 13.44 (H) 3.90 - 12.70 K/uL    RBC 3.94 (L) 4.00 - 5.40 M/uL    Hemoglobin 10.6 (L) 12.0 - 16.0 g/dL    Hematocrit 35.7 (L) 37.0 - 48.5 %    Mean Corpuscular Volume 91 82 - 98 fL    Mean Corpuscular Hemoglobin 26.9 (L) 27.0 - 31.0 pg    Mean Corpuscular Hemoglobin Conc 29.7 (L) 32.0 - 36.0 g/dL    RDW 15.7 (H) 11.5 - 14.5 %    Platelets 527 (H) 150 - 350 K/uL    MPV 9.0 (L) 9.2 - 12.9 fL    Immature Granulocytes 0.4 0.0 - 0.5 %    Gran # (ANC) 10.2 (H) 1.8 - 7.7 K/uL    Immature Grans (Abs) 0.05 (H) 0.00 - 0.04 K/uL    Lymph # 1.4 1.0 - 4.8 K/uL    Mono # 1.6 (H) 0.3 - 1.0 K/uL    Eos # 0.1 0.0 - 0.5 K/uL    Baso # 0.03 0.00 - 0.20 K/uL    nRBC 0 0 /100 WBC    Gran% 76.3 (H) 38.0 - 73.0 %    Lymph% 10.6 (L) 18.0 - 48.0 %    Mono% 12.1 4.0 - 15.0 %    Eosinophil% 0.4 0.0 - 8.0 %    Basophil% 0.2 0.0 - 1.9 %    Differential Method Automated    Comprehensive metabolic panel    Collection Time: 10/08/20  5:57 AM   Result Value Ref Range    Sodium 136 136 - 145 mmol/L    Potassium 3.1 (L) 3.5 - 5.1 mmol/L    Chloride 99 95 - 110 mmol/L    CO2 35 (H) 23 - 29 mmol/L    Glucose 94 70 - 110 mg/dL    BUN, Bld 7 (L) 8 - 23 mg/dL    Creatinine 0.5 0.5 - 1.4 mg/dL    Calcium 9.3 8.7 - 10.5 mg/dL    Total Protein 7.0 6.0 - 8.4 g/dL    Albumin 1.8 (L) 3.5 - 5.2 g/dL    Total Bilirubin 0.5 0.1 - 1.0 mg/dL    Alkaline Phosphatase 93 55 - 135 U/L    AST 10 10 - 40 U/L    ALT 9 (L) 10 - 44 U/L    Anion Gap 2 (L) 8 - 16 mmol/L    eGFR if African American >60.0 >60 mL/min/1.73 m^2    eGFR if non African American >60.0 >60  mL/min/1.73 m^2       No results for input(s): POCTGLUCOSE in the last 168 hours.    Active Hospital Problems    Diagnosis  POA    Discitis [M46.40]  Yes      Resolved Hospital Problems   No resolved problems to display.         Assessment and Plan:  Discitis of thoracic region  MRSA Bacteremia  -Pt. With recent hx of MRSA bacteremia presents with back pain. MRI shows  Evidence of discitis/osteomyelitis at T9-T10 with possible small associated abscesses within the adjacent soft tissues  -Neurosurgery consulted regarding need for intervention  -Continue broad spectrum abx with vanc/zosyn therapy for now, but strong suspicion for MRSA as cause.  -Blood cultures ordered as they were not drawn at OSH prior to transfer (note that abx have already been given)  -Consult ID for further recommendations regarding need for zosyn    HTN  -Continue home BP meds     Dementia without behavioral disturbance  -Continue donepezil PRN  -Delirium precautions     CAD  -Pt. With recent NSTEMI during admission last month. Was deemed Poor cath lab candidate - dementia/DNR  -Continue GDMT with ASA, b-blocker, ACEi, and statin. Will hold plavix for now as pt. Does not have an absolute indication until neurosurgery evaluates need for intervention     DVT PPx: Lovenox    Morales Garzon MD  Hospital Medicine Staff  [phone] pager

## 2020-10-09 NOTE — PLAN OF CARE
Recommendations    Recommendation:  1. [place] regular diet, ADAT to regular texture per SLP   2. Add boost plus BID to increase intake   3. Suggest Vitmain C, zinc and MVI to aid in wound healing  4. RD to monitor and follow up    Goals: pt to tolerate >85% of EEN/EPN by RD follow up  Nutrition Goal Status: new  Communication of RD Recs: other (comment)(POC)

## 2020-10-09 NOTE — CONSULTS
"  [facility]  Adult Nutrition  Consult Note    SUMMARY     Recommendations    Recommendation:  1. S[place]set regular diet, ADAT to regular texture per SLP   2. Add boost plus BID to increase intake   3. Suggest Vitmain C, zinc and MVI to aid in wound healing  4. RD to monitor and follow up    Goals: pt to tolerate >85% of EEN/EPN by RD follow up  Nutrition Goal Status: new  Communication of RD Recs: other (comment)(POC)    Reason for Assessment    Reason For Assessment: consult  Diagnosis: (discitis)  Relevant Medical History: HTN; CAD; obesity; HLD; myopathy  Interdisciplinary Rounds: did not attend  General Information Comments: Pt resting in bed, remains NPO at this time. PTA states poor PO intake at home for a few weeks. Wt loss reported of ~40 lbs from UBW of 345 lbs. No c/o N/V/C/D reported at this time. Agrees to drink boost plus to increase intake. NFPE completed today, pt with mild muscle/fat loss. Meets criteria for moderate malnutrition at this time.  Nutrition Discharge Planning: adequate PO intake    Nutrition Risk Screen    Nutrition Risk Screen: no indicators present    Nutrition/Diet History    Food Allergies: NKFA  Factors Affecting Nutritional Intake: NPO    Anthropometrics    Temp: 98.5 °F (36.9 °C)  Height: 5' 10" (177.8 cm)  Height (inches): 70 in  Weight Method: Bed Scale  Weight: (!) 137 kg (302 lb 0.5 oz)  Weight (lb): (!) 302.03 lb  Ideal Body Weight (IBW), Female: 150 lb  % Ideal Body Weight, Female (lb): 201.35 %  BMI (Calculated): 43.3  BMI Grade: greater than 40 - morbid obesity    Lab/Procedures/Meds    Pertinent Labs Reviewed: reviewed  Pertinent Labs Comments: BUN  24; Cr 1.5; Glucose 161; Ca 8.1  Pertinent Medications Reviewed: reviewed  Pertinent Medications Comments: furosemide; lisinopril; statin; vancomycin; aspirin     Estimated/Assessed Needs    Weight Used For Calorie Calculations: (!) 137 kg (302 lb 0.5 oz)  Energy Calorie Requirements (kcal): 1960 " kcal/d  Energy Need Method: [place]- Jay  Protein Requirements: 109-137 g/day  Weight Used For Protein Calculations: (!) 137 kg (302 lb 0.5 oz)  Fluid Requirements (mL): 1 mL/kcal or per MD  RDA Method (mL): 1960    Nutrition Prescription Ordered    Current Diet Order: NPO    Evaluation of Received Nutrient/Fluid Intake    Energy Calories Required: not meeting needs  Protein Required: not meeting needs  Fluid Required: not meeting needs  Comments: LBM 10/5  Tolerance: tolerating  % Intake of Estimated Energy Needs: 0 - 25 %  % Meal Intake: NPO    Nutrition Risk    Level of Risk/Frequency of Follow-up: high     Assessment and Plan  Nutrition Problem:   Moderate Protein-Calorie Malnutrition  Malnutrition in the context of Chronic Illness/Injury    Related to (etiology):  Decreased intake     Signs and Symptoms (as evidenced by):  Energy Intake: <75% of estimated energy requirement for >1 month   Body Fat Depletion: mild depletion of orbitals   Muscle Mass Depletion: mild depletion of temples   Weight Loss: 12% x 1 month     Interventions(treatment strategy):  Collaboration of care with other providers    Nutrition Diagnosis Status:  New    Monitor and Evaluation    Food and Nutrient Intake: food and beverage intake, energy intake  Food and Nutrient Adminstration: diet order  Knowledge/Beliefs/Attitudes: food and nutrition knowledge/skill  Anthropometric Measurements: weight, weight change  Biochemical Data, Medical Tests and Procedures: electrolyte and renal panel, gastrointestinal profile, glucose/endocrine profile, inflammatory profile, lipid profile  Nutrition-Focused Physical Findings: overall appearance     Malnutrition Assessment  Orbital Region (Subcutaneous Fat Loss): mild depletion  Upper Arm Region (Subcutaneous Fat Loss): well nourished  Thoracic and Lumbar Region: well nourished   [place] Region (Muscle Loss): mild depletion  Clavicle Bone Region (Muscle Loss): well nourished  Clavicle and Acromion Bone  Region (Muscle Loss): well nourished  Dorsal Hand (Muscle Loss): mild depletion  Anterior Thigh Region (Muscle Loss): well nourished  Posterior Calf Region (Muscle Loss): well nourished     Nutrition Follow-Up    RD Follow-up?: Yes

## 2020-10-09 NOTE — HPI
Patient is a 70-year-old female with a history of recent hospitalization x2 including a Staph aureus bacteremia and then a subsequent pneumonia who at the last hospitalization about 1-2 weeks ago made a significant improvement with antibiotics return home with her daughter.  The patient reports she was doing well until about 24 hours ago began having excruciating flank pain.  In the ED she had imaging studies that showed a diskitis at the bottom thoracic spine levels and the concern of a possible epidural abscess/vertebral infection was entertained.  The case was discussed with an outside facility that had neurosurgical services and they felt that an MRI was needed before transfer.  She was transferred from [facility].  The patient has some dementia and during her last few hospitalizations was placed do not resuscitate because of her deteriorating condition.     Per dc summary from last hospitalization patient was treated from 9/23 to 10/1 with zyvox then dc'd.  Blood cultures cleared on 9/26.  And repeats 10/8 are NGTD.    MRI T spine obtained showing:  There is fluid signal within the T9-T10 disc space along with destructive changes of the adjacent vertebral body endplates and associated marrow edema, suggestive of discitis and osteomyelitis.  Small ovoid areas of fluid signal within the soft tissues along the lateral aspects of the T9-T10 disc space bilaterally could reflect abscesses.  The collection on the left measures 2.7 x 1.5 cm and the collection on the right measures 3.8 x 2.4 cm.  Mild degenerative related signal change is seen along the endplates of multiple other thoracic bodies.    CT renal stone study shows:  Destructive changes at the endplates of the T9 and T10 bodies, new when compared to the chest CT from 08/09/2020 and suspicious for discitis.  Mild-to-moderate right hydronephrosis along with multiple calcifications in the vicinity of the distal right ureter, most of which likely reflect calcified  phleboliths.  One of these calcifications could represent a ureteral stone.  Small bilateral pleural effusions with adjacent atelectasis/infiltrates.    Patient afebrile and WBC 13s.  NSGY consulted. On Vanc and Zosyn.  ID consulted for abx recs.  Patient co of sever back pain.  She denies dysuria, fever, chills and sweats.

## 2020-10-09 NOTE — PT/OT/SLP EVAL
Physical Therapy Evaluation    Patient Name:  Martina Betancourt   MRN:  [id number]    Recommendations:     Discharge Recommendations:  nursing facility, skilled(Option for longterm care/senior care)   Discharge Equipment Recommendations: other (see comments)(TBD by next level of care)   Barriers to discharge: None    Assessment:     Martina Betancourt is a 70 y.o. female admitted with a medical diagnosis of Discitis.  She presents with the following impairments/functional limitations:  weakness, impaired endurance, impaired self care skills, impaired functional mobilty, gait instability, impaired balance, impaired sensation, decreased upper extremity function, decreased lower extremity function, pain, decreased safety awareness. PT recommending skilled nursing facility upon DC from hospital.    Rehab Prognosis: Good; patient would benefit from acute skilled PT services to address these deficits and reach maximum level of function.    Recent Surgery: * No surgery found *      Plan:     During this hospitalization, patient to be seen 3 x/week to address the identified rehab impairments via gait training, therapeutic activities, therapeutic exercises, neuromuscular re-education and progress toward the following goals:    · Plan of Care Expires:  11/08/20    Subjective     Chief Complaint: pain  Patient/Family Comments/goals: improve functional mobility  Pain/Comfort:  · Pain Rating 1: 10/10  · Location - Orientation 1: generalized  · Location 1: back  · Pain Addressed 1: Reposition, Distraction, Cessation of Activity  · Pain Rating Post-Intervention 1: 10/10    Patients cultural, spiritual, Methodist conflicts given the current situation: no    Living Environment:  Pt lives with her daughter in a [facility] with 0E.  Prior to onset of symptoms/admission in June, pt was modified independent with a RW. Prior to previous admission, pt was primarily bed bound due to severe back pain. Equipment used at home: walker, rolling, oxygen,  hospital bed, bedside commode.  DME owned (not currently used): rolling walker and bedside commode.  Upon discharge, patient will have assistance from daughter.    Objective:     Communicated with Rn prior to session.  Patient found HOB elevated with PureWick, peripheral IV  upon PT entry to room.    General Precautions: Standard, fall   Orthopedic Precautions:N/A   Braces: N/A     Exams:  · Cognitive Exam:  Patient is oriented to Person, Place, Time and Situation  · Gross Motor Coordination:  WFL  · Postural Exam:  AUBREY, EOB deferred 2/2 pain  · Sensation: BLE feet impaired sensation 2/2 neuropathy, R>L 2/2 previous foot fracture   · RLE ROM: decreased 2/2 body habitus  · RLE Strength: Deficits: grossly 3+/5, except DF 4/5  · LLE ROM: decreased 2/2 body habitus  · LLE Strength: grossly 3+/5, except DF 4/5    Functional Mobility:  · Bed Mobility:     · Rolling Left:  maximal assistance  · Rolling Right: maximal assistance  · Supine to Sit: deferred 2/2 pain    Therapeutic Activities and Exercises:  Patient and daughter educated on log roll technique.  Patient educated on role of therapy, goals of session, and benefits of mobilizing.   Discussed PT plan of care during hospitalization.   Patient educated on calling for assistance.   Patient educated on how their diagnosis impacts their mobility within PT scope of practice.   Communication board up to date.  All questions answered within PT scope of practice.      AM-PAC 6 CLICK MOBILITY  Total Score:7     Patient left HOB elevated with all lines intact, call button in reach, bed alarm on and daughter present.    GOALS:   Multidisciplinary Problems     Physical Therapy Goals        Problem: Physical Therapy Goal    Goal Priority Disciplines Outcome Goal Variances Interventions   Physical Therapy Goal     PT, PT/OT Ongoing, Progressing     Description: Goals to be met by: 10/23/2020    Patient will increase functional independence with mobility by performin. Pt  will perform bed mobility (rolling L/R, scooting, and bridging) with Rose.  2. Pt will perform supine to/from sit with Rose.  3. Pt will sit EOB x 10 mins with no UE support with min assistance.  4. Pt will perform sit to stand with max assistance and RW.  5. Pt will perform there-ex from handout x 15 reps to improve strength for functional mobility.                         History:     Past Medical History:   Diagnosis Date    Alzheimer disease     Alzheimer disease     Coronary artery disease     Hyperlipidemia     Hypertension     Myopathy     Non-ST elevation (NSTEMI) myocardial infarction     Obesity     Pneumonia     Sleep apnea        Past Surgical History:   Procedure Laterality Date    APPENDECTOMY      APPENDECTOMY      CHOLECYSTECTOMY      CORONARY STENT PLACEMENT      HYSTERECTOMY      TONSILLECTOMY         Time Tracking:     PT Received On: 10/09/20  PT Start Time: 1545     PT Stop Time: 1601  PT Total Time (min): 16 min     Billable Minutes: Evaluation 8 and Therapeutic Activity 8      Gissell Holloway, PT  10/09/2020

## 2020-10-09 NOTE — CONSULTS
Wound consult received on patient's right heel. Unstageable pressure injury noted to right heel, 1cm x 1cm dry stable crust/scabbing present. Recommend painting with betadine daily. Left heel intact. Recommend keeping heels elevated with heel boots.  Nursing to continue care. Wound care to follow prn.

## 2020-10-09 NOTE — PLAN OF CARE
PT Eval complete and POC established.    Gissell Holloway, PT, DPT  10/9/2020      Problem: Physical Therapy Goal  Goal: Physical Therapy Goal  Description: Goals to be met by: 10/23/2020    Patient will increase functional independence with mobility by performin. Pt will perform bed mobility (rolling L/R, scooting, and bridging) with Rose.  2. Pt will perform supine to/from sit with Rose.  3. Pt will sit EOB x 10 mins with no UE support with min assistance.  4. Pt will perform sit to stand with max assistance and RW.  5. Pt will perform there-ex from handout x 15 reps to improve strength for functional mobility.        Outcome: Ongoing, Progressing

## 2020-10-09 NOTE — CONSULTS
[facility]  Infectious Disease  Consult Note    Patient Name: Martina Betancourt  MRN: [id number]  Admission Date: 10/8/2020  Hospital Length of Stay: 1 days  Attending Physician: Tha Villarreal MD  Primary Care Provider: Joe Fuller Iii, MD       Inpatient consult to Infectious Diseases  Consult performed by: LAYO Hightower Jr.  Consult ordered by: Morales Garzon MD      Consult received.  Full consult to follow.    LAYO Kennedy  Infectious Disease  [facility]

## 2020-10-09 NOTE — PT/OT/SLP EVAL
Occupational Therapy   Evaluation    Name: Martina Betancourt  MRN: [id number]  Admitting Diagnosis:  Discitis      Recommendations:     Discharge Recommendations: nursing facility, skilled  Discharge Equipment Recommendations:  (TBD)  Barriers to discharge:       Assessment:     Martina Betancourt is a 70 y.o. female with a medical diagnosis of Discitis.  She presents with uncontrolled pain and a decline in functional mobility and ADL participation. Pt was willing to sit EOB and participate in therapy, but limited by back pain and dizziness. Concerns for s/s for OH. Will assess with mobility next visit. Pt demonstrates good EOB sitting with no assistance for balance to perform ADLs. Performance deficits affecting function: weakness, impaired endurance, impaired self care skills, impaired functional mobilty, decreased upper extremity function, decreased lower extremity function, pain.      Rehab Prognosis: Good; patient would benefit from acute skilled OT services to address these deficits and reach maximum level of function.       Plan:     Patient to be seen 3 x/week to address the above listed problems via therapeutic exercises, therapeutic activities, self-care/home management  · Plan of Care Expires: 11/09/20  · Plan of Care Reviewed with: patient    Subjective     Chief Complaint: Back pain  Patient/Family Comments/goals: Pt wishes to return to prior level of function.    Occupational Profile:  Living Environment: Pt lives in a [facility] with daughter and his . Pt has no steps to enter.   Previous level of function: Pt reports being independent in all ADLs prior to being in and out of the hospital since the beginning of July 2020.  Roles and Routines: Mother  Equipment Used at Home:  bedside commode, power chair (hover round), hospital bed, grab bar, walker, rolling, wheelchair, trapeze, walk in shower, shower chair  Assistance upon Discharge: Daughter's  to provide assistance at  home.    Pain/Comfort:  · Pain Rating 1: 10/10  · Location 1: back  · Pain Addressed 1: Distraction, Cessation of Activity  · Pain Rating Post-Intervention 1: other (see comments)(Pt in pain, did not rate)    Patients cultural, spiritual, Jehovah's witness conflicts given the current situation: no    Objective:     Communicated with: RN prior to session.  Patient found supine with peripheral IV, PureWick upon OT entry to room.    General Precautions: Standard,     Orthopedic Precautions:N/A   Braces: N/A     Occupational Performance:    Bed Mobility:    · Patient completed Rolling/Turning to Left with  total assistance and 2 persons  · Patient completed Scooting/Bridging with total assistance and 2 persons  · Patient completed Supine to Sit with total assistance and 2 persons  · Patient completed Sit to Supine with total assistance and 2 persons   · Pt completed scooting to EOB while seated with Max A.    Functional Mobility/Transfers:  · Functional Mobility: Pt able to sit EOB and maintain balance with SBA.    Activities of Daily Living:  · Pt deferred to feeling of dizziness and pain while seated EOB.     Physical Exam:  Balance:    -       Pt maintain adequate postural control white sitting.    AMPAC 6 Click ADL:  AMPAC Total Score: 7    Treatment & Education:  Pt educated on role of OT in acute care setting.   Assisted with ADLs and functional mobility with assist levels noted above   Pt educated on purpose OT evaluation.      Education:    Patient left supine with call button in reach    GOALS:   Multidisciplinary Problems     Occupational Therapy Goals     Not on file                History:     Past Medical History:   Diagnosis Date    Alzheimer disease     Alzheimer disease     Coronary artery disease     Hyperlipidemia     Hypertension     Myopathy     Non-ST elevation (NSTEMI) myocardial infarction     Obesity     Pneumonia     Sleep apnea        Past Surgical History:   Procedure Laterality Date     APPENDECTOMY      APPENDECTOMY      CHOLECYSTECTOMY      CORONARY STENT PLACEMENT      HYSTERECTOMY      TONSILLECTOMY         Time Tracking:     OT Date of Treatment: 10/09/20  OT Start Time: 1357  OT Stop Time: 1426  OT Total Time (min): 29 min    Billable Minutes:Evaluation 10  Therapeutic Activity 19    WILLOW Pierre  10/9/2020     .

## 2020-10-09 NOTE — PROGRESS NOTES
Hospital Medicine  Progress note    Team: [facility] HOSP MED A Tha Villarreal MD  Admit Date: 10/8/2020  BR[facility]   Length of Stay:  LOS: 1 day   Code status: Full Code    Principal Problem:  Discitis    HPI / Hospital Course     Interval hx:  10/09 await ID consult. VSS, WBC is 13.2. BC-NGTD    ROS     Respiratory: neg for cough neg for shortness of breath  Cardiovascular: neg for chest pain neg for palpitations  Gastrointestinal: neg for nausea neg for vomiting, neg for abdominal pain neg for diarrhea neg for constipation   Behavioral/Psych: neg for depression neg for anxiety    PEx  Temp:  [97.7 °F (36.5 °C)-98.2 °F (36.8 °C)]   Pulse:  [60-66]   Resp:  [15-22]   BP: (129-160)/(63-70)   SpO2:  [90 %-94 %]   No intake or output data in the 24 hours ending 10/09/20 0817    General Appearance: no acute distress   Heart: regular rate and rhythm  Respiratory: Normal respiratory effort, no crackles   Abdomen: Soft, non-tender; bowel sounds active  Skin: intact.Skin intact  Neurologic:  No focal numbness or weakness  Mental status: Alert, oriented x 4, affect appropriate     Recent Labs   Lab 10/07/20  1421 10/08/20  0557 10/09/20  0439   WBC 14.55* 13.44* 13.25*   HGB 10.6* 10.6* 11.0*   HCT 35.4* 35.7* 37.5   * 527* 510*     Recent Labs   Lab 10/07/20  1421 10/08/20  0557 10/09/20  0439    136 142   K 3.6 3.1* 3.6   CL 99 99 98   CO2 35* 35* 33*   BUN 8 7* 9   CREATININE 0.5 0.5 0.7   GLU 96 94 95   CALCIUM 9.5 9.3 9.7     Recent Labs   Lab 10/07/20  1421 10/08/20  0557 10/09/20  0439   ALKPHOS 98 93 101   ALT 9* 9* 8*   AST 11 10 14   ALBUMIN 1.8* 1.8* 2.0*   PROT 7.1 7.0 7.0   BILITOT 0.4 0.5 0.5        No results for input(s): POCTGLUCOSE in the last 168 hours.    Scheduled Meds:   aspirin  81 mg Oral Daily    donepeziL  10 mg Oral QHS    DULoxetine  60 mg Oral Daily    enoxaparin  40 mg Subcutaneous Q24H    furosemide  40 mg Oral Daily    isosorbide mononitrate  60 mg Oral Daily    lisinopriL  2.5 mg  Oral Daily    metoprolol succinate  50 mg Oral Daily    pantoprazole  40 mg Oral Before breakfast    piperacillin-tazobactam (ZOSYN) IVPB  4.5 g Intravenous Q8H    pravastatin  40 mg Oral QHS    vancomycin (VANCOCIN) IVPB  2,500 mg Intravenous Q24H     Continuous Infusions:  As Needed:  albuterol-ipratropium, glucose, glucose, HYDROcodone-acetaminophen, HYDROmorphone, melatonin, ondansetron, prochlorperazine, sodium chloride 0.9%, Pharmacy to dose Vancomycin consult **AND** vancomycin - pharmacy to dose    ** update problem list    Active Hospital Problems    Diagnosis  POA    *Discitis [M46.40]  Yes    HTN (hypertension) [I10]  Unknown    CAD (coronary artery disease) [I25.10]  Unknown    MRSA bacteremia [R78.81, B95.62]  Yes    Dementia without behavioral disturbance [F03.90]  Yes      Resolved Hospital Problems   No resolved problems to display.       Assessment and Plan  / Problems managed today    MRSA Bacteremia  -Pt. With recent hx of MRSA bacteremia presents with back pain. MRI shows  Evidence of discitis/osteomyelitis at T9-T10 with possible small associated abscesses within the adjacent soft tissues  -Neurosurgery consulted regarding need for intervention  -Continue broad spectrum abx with vanc/zosyn therapy for now, but strong suspicion for MRSA as cause.  -Blood cultures ordered as they were not drawn at OSH prior to transfer (note that abx have already been given)  -Consult ID for further recommendations regarding need for zosyn     HTN  -Continue home BP meds     Dementia without behavioral disturbance  -Continue donepezil PRN  -Delirium precautions     CAD  -Pt. With recent NSTEMI during admission last month. Was deemed Poor cath lab candidate - dementia/DNR  -Continue GDMT with ASA, b-blocker, ACEi, and statin. Will hold plavix for now as pt. Does not have an absolute indication until neurosurgery evaluates need for intervention     DVT PPx: Lovenox           :    Goals of Care:  Return to  prior functional status    Discharge plan:    Time (minutes) spent in care of the patient (Greater than 1/2 spent in direct face-to-face contact)  35 minutes    Tha Villarreal MD

## 2020-10-09 NOTE — PLAN OF CARE
Eval complete  Continue with POC    Problem: Occupational Therapy Goal  Goal: Occupational Therapy Goal  Description: Goals to be met by: 10/16/2020     Patient will increase functional independence with ADLs by performing:    Feeding with Minimal Assistance seated EOB.  UE Dressing with Minimal Assistance seated EOB.  Grooming while EOB with Minimal Assistance.  Bathing from edge of bed with wipes with Minimal Assistance.  Rolling to Bilateral with Moderate Assistance.   Supine to sit with Moderate Assistance.    Outcome: Ongoing, Progressing     WILLOW Pierre  10/9/2020  Student OT

## 2020-10-09 NOTE — PLAN OF CARE
CM met with patient in room for Discharge Planning Assessment.  Per patient,  patient lives with daughter and grand daughter in a(n) SS with 0 steps to enter.   Per patient, patient was partial assist with ADLS and used rolling walker for ambulation.  Per patient, the patient will have assistance from family upon discharge.  Discharge Plan A Home with home health and Discharge Plan B SS SNF.  Discharge Planning Booklet given to patient/family and discussed.  All questions addressed.     PCP:  Joe Fuller Iii, MD      Pharmacy:    [address] [address]  Phone: [phone] Fax: [phone]        Emergency Contacts:  Extended Emergency Contact Information  Primary Emergency Contact: Sharon Turk  Mobile Phone: [phone]  Relation: Daughter  Secondary Emergency Contact: Christelle Yoder  Mobile Phone: [phone]  Relation: Grandchild      Insurance:    Payor: MEDICARE / Plan: MEDICARE PART A & B / Product Type: [facility] /        10/09/20 1307   Discharge Assessment   Assessment Type Discharge Planning Assessment   Confirmed/corrected address and phone number on facesheet? Yes   Assessment information obtained from? Patient   Expected Length of Stay (days) 4   Communicated expected length of stay with patient/caregiver yes   Prior to hospitilization cognitive status: Alert/Oriented;No Deficits   Prior to hospitalization functional status: Needs Assistance;Assistive Equipment   Current cognitive status: Alert/Oriented   Current Functional Status: Assistive Equipment;Needs Assistance;Partially Dependent   Facility Arrived From: [facility]   Lives With child(jamaica), adult   Able to Return to Prior Arrangements yes   Is patient able to care for self after discharge? Unable to determine at this time (comments)   Who are your caregiver(s) and their phone number(s)? Sharon Turk daughter [phone]; Christelle Paresh grand daughter  [phone]   Patient's perception of discharge disposition home health   Readmission Within the Last 30 Days current reason for admission unrelated to previous admission   If yes, most recent facility name: skilled Nursing facility and [facility]   Patient currently being followed by outpatient case management? No   Patient currently receives any other outside agency services? Yes   How many hours a day does the patient receive services? 8   Name and contact number of agency or person providing outside services [facility]   Is it the patient/care giver preference to resume care with the current outside agency? Yes   Equipment Currently Used at Home bedside commode;CPAP;grab bar;hospital bed;oxygen;wheelchair;walker, rolling   Part D Coverage medicare/medicaid   Do you have any problems affording any of your prescribed medications? TBD   Is the patient taking medications as prescribed? yes   Does the patient have transportation home? Yes   Transportation Anticipated family or friend will provide  (daughter/grand daughter)   Dialysis Name and Scheduled days na   Does the patient receive services at the Coumadin Clinic? No   Discharge Plan A Home with family;Home Health   Discharge Plan B Skilled Nursing Facility  (Wants Swing bed SNF)   DME Needed Upon Discharge  other (see comments)  (tbd)   Patient/Family in Agreement with Plan yes   Readmission Questionnaire   At the time of your discharge, did someone talk to you about what your health problems were? Yes   At the time of discharge, did someone talk to you about what to watch out for regarding worsening of your health problem? Yes   At the time of discharge, did someone talk to you about what to do if you experienced worsening of your health problem? Yes   At the time of discharge, did someone talk to you about which medication to take when you left the hospital and which ones to stop taking? Yes   At the time of discharge, did someone talk to you about when and  where to follow up with a doctor after you left the hospital? Yes   What do you believe caused you to be sick enough to be re-admitted? infection in spine   How often do you need to have someone help you when you read instructions, pamphlets, or other written material from your doctor or pharmacy? Always   Do you have problems taking your medications as prescribed? No   Do you have any problems affording any of  your prescribed medications? To be determined   Do you have problems obtaining/receiving your medications? Yes   Does the patient have transportation to healthcare appointments? Yes   Living Arrangements house   Does the patient have family/friends to help with healtcare needs after discharge? yes   Does your caregiver provide all the help you need? Yes   Are you currently feeling confused? No   Are you currently having problems thinking? No   Are you currently having memory problems? No   Have you felt down, depressed, or hopeless? 0   Have you felt little interest or pleasure in doing things? 0   In the last 7 days, my sleep quality was: jonny Tierney RN  Case Management  Ext: [phone]

## 2020-10-09 NOTE — SUBJECTIVE & OBJECTIVE
Past Medical History:   Diagnosis Date    Alzheimer disease     Alzheimer disease     Coronary artery disease     Hyperlipidemia     Hypertension     Myopathy     Non-ST elevation (NSTEMI) myocardial infarction     Obesity     Pneumonia     Sleep apnea        Past Surgical History:   Procedure Laterality Date    APPENDECTOMY      APPENDECTOMY      CHOLECYSTECTOMY      CORONARY STENT PLACEMENT      HYSTERECTOMY      TONSILLECTOMY         Review of patient's allergies indicates:   Allergen Reactions    Hydroxyzine hcl     Tizanidine        Medications:  Medications Prior to Admission   Medication Sig    acetaminophen (TYLENOL) 325 MG tablet Take 2 tablets (650 mg total) by mouth every 6 (six) hours as needed (HEADACHE, TEMPERATURE - FEVER > 100.4).    albuterol-ipratropium (DUO-NEB) 2.5 mg-0.5 mg/3 mL nebulizer solution Take 3 mLs by nebulization every 6 (six) hours as needed for Wheezing or Shortness of Breath. Rescue    aspirin (ECOTRIN) 81 MG EC tablet Take 81 mg by mouth once daily.    clopidogreL (PLAVIX) 75 mg tablet Take 1 tablet (75 mg total) by mouth once daily.    donepeziL (ARICEPT) 10 MG tablet Take 10 mg by mouth every evening.    DULoxetine (CYMBALTA) 60 MG capsule Take 60 mg by mouth once daily.    ferrous sulfate 324 mg (65 mg iron) TbEC Take 325 mg by mouth every evening.    furosemide (LASIX) 40 MG tablet Take 1 tablet (40 mg total) by mouth once daily.    isosorbide mononitrate (IMDUR) 60 MG 24 hr tablet Take 60 mg by mouth once daily.    lisinopriL (PRINIVIL,ZESTRIL) 2.5 MG tablet Take 1 tablet (2.5 mg total) by mouth once daily.    memantine (NAMENDA XR) 28 mg CSpX Take 28 mg by mouth once daily.    metoprolol succinate (TOPROL-XL) 50 MG 24 hr tablet Take 50 mg by mouth once daily.    miconazole nitrate 2% (MICOTIN) 2 % Oint Apply topically 2 (two) times daily.    pantoprazole (PROTONIX) 40 MG tablet Take 1 tablet (40 mg total) by mouth before breakfast.     pravastatin (PRAVACHOL) 40 MG tablet Take 40 mg by mouth every evening.     Antibiotics (From admission, onward)    Start     Stop Route Frequency Ordered    10/09/20 0600  vancomycin (VANCOCIN) 2,500 mg in dextrose 5 % 500 mL IVPB      -- IV Every 24 hours (non-standard times) 10/09/20 0039    10/09/20 0000  piperacillin-tazobactam 4.5 g in sodium chloride 0.9% 100 mL IVPB (ready to mix system)      -- IV Every 8 hours (non-standard times) 10/08/20 2247    10/08/20 2334  vancomycin - pharmacy to dose  (vancomycin IVPB)      -- IV pharmacy to manage frequency 10/08/20 2234        Antifungals (From admission, onward)    None        Antivirals (From admission, onward)    None           Immunization History   Administered Date(s) Administered    Influenza (FLUAD) - Quadrivalent - Adjuvanted - PF *Preferred* (65+) 10/01/2020    PPD Test 07/29/2020       Family History     None        Social History     Socioeconomic History    Marital status:      Spouse name: Not on file    Number of children: Not on file    Years of education: Not on file    Highest education level: Not on file   Occupational History    Not on file   Social Needs    Financial resource strain: Not on file    Food insecurity     Worry: Not on file     Inability: Not on file    Transportation needs     Medical: Not on file     Non-medical: Not on file   Tobacco Use    Smoking status: Unknown If Ever Smoked   Substance and Sexual Activity    Alcohol use: Not Currently    Drug use: Never    Sexual activity: Not Currently   Lifestyle    Physical activity     Days per week: Not on file     Minutes per session: Not on file    Stress: Not on file   Relationships    Social connections     Talks on phone: Not on file     Gets together: Not on file     Attends Voodoo service: Not on file     Active member of club or organization: Not on file     Attends meetings of clubs or organizations: Not on file     Relationship status: Not on file    Other Topics Concern    Not on file   Social History Narrative    Not on file     Review of Systems   Constitutional: Negative for appetite change, chills, diaphoresis, fatigue, fever and unexpected weight change.   HENT: Negative for congestion, ear pain, hearing loss, sore throat and tinnitus.    Eyes: Negative for pain, redness and visual disturbance.   Respiratory: Negative for cough, chest tightness, shortness of breath and wheezing.    Cardiovascular: Negative for chest pain.   Gastrointestinal: Negative for abdominal pain, constipation, diarrhea, nausea and vomiting.   Endocrine: Negative for cold intolerance and heat intolerance.   Genitourinary: Negative for decreased urine volume, difficulty urinating, dysuria, flank pain, frequency, hematuria and urgency.   Musculoskeletal: Positive for back pain. Negative for arthralgias, myalgias and neck pain.   Skin: Negative for rash and wound.   Allergic/Immunologic: Negative for environmental allergies, food allergies and immunocompromised state.   Neurological: Negative for dizziness, facial asymmetry, weakness, light-headedness, numbness and headaches.   Hematological: Negative for adenopathy. Does not bruise/bleed easily.   Psychiatric/Behavioral: Negative for agitation, behavioral problems and confusion.     Objective:     Vital Signs (Most Recent):  Temp: 98.2 °F (36.8 °C) (10/09/20 0853)  Pulse: 64 (10/09/20 0900)  Resp: 19 (10/09/20 0854)  BP: (!) 144/81 (10/09/20 0854)  SpO2: 96 % (10/09/20 0854) Vital Signs (24h Range):  Temp:  [97.7 °F (36.5 °C)-98.2 °F (36.8 °C)] 98.2 °F (36.8 °C)  Pulse:  [60-66] 64  Resp:  [15-22] 19  SpO2:  [90 %-96 %] 96 %  BP: (129-160)/(63-81) 144/81     Weight: (!) 137 kg (302 lb 0.5 oz)  Body mass index is 43.34 kg/m².    Estimated Creatinine Clearance: 110 mL/min (based on SCr of 0.7 mg/dL).    Physical Exam  Constitutional:       General: She is not in acute distress.     Appearance: She is well-developed. She is not  diaphoretic.       HENT:      Head: Normocephalic and atraumatic.   Cardiovascular:      Rate and Rhythm: Normal rate and regular rhythm.      Heart sounds: Normal heart sounds. No murmur. No friction rub. No gallop.    Pulmonary:      Effort: Pulmonary effort is normal. No respiratory distress.      Breath sounds: Normal breath sounds. No wheezing or rales.   Abdominal:      General: Bowel sounds are normal. There is no distension.      Palpations: Abdomen is soft. There is no mass.      Tenderness: There is no abdominal tenderness. There is no guarding or rebound.   Skin:     General: Skin is warm and dry.   Neurological:      Mental Status: She is alert and oriented to person, place, and time.   Psychiatric:         Behavior: Behavior normal.         Significant Labs:   Blood Culture:   Recent Labs   Lab 09/23/20  1422 09/23/20  1433 09/26/20  1452 10/08/20  2344 10/08/20  2345   LABBLOO Gram stain dale bottle: Gram positive cocci in clusters resembling Staph   Results called to and read back by:Parag Galvan RN 09/24/2020  11:30  Gram stain aer bottle: Gram positive cocci in clusters resembling Staph  Positive results previously called 09/24/2020  13:53  METHICILLIN RESISTANT STAPHYLOCOCCUS AUREUS  ID consult required at [facility].  * Gram stain dale bottle: Gram positive cocci in clusters resembling Staph   Results called to and read back by:Parag Galvan RN 09/24/2020  11:30  Gram stain aer bottle: Gram positive cocci in clusters resembling Staph   Positive results previously called 09/24/2020  13:51  METHICILLIN RESISTANT STAPHYLOCOCCUS AUREUS  ID consult required at [facility].  For susceptibility see order #[id number]  * No growth after 5 days. No Growth to date No Growth to date     CBC:   Recent Labs   Lab 10/07/20  1421 10/08/20  0557 10/09/20  0439   WBC 14.55* 13.44* 13.25*   HGB 10.6* 10.6* 11.0*   HCT 35.4* 35.7* 37.5   *  527* 510*     CMP:   Recent Labs   Lab 10/07/20  1421 10/08/20  0557 10/09/20  0439    136 142   K 3.6 3.1* 3.6   CL 99 99 98   CO2 35* 35* 33*   GLU 96 94 95   BUN 8 7* 9   CREATININE 0.5 0.5 0.7   CALCIUM 9.5 9.3 9.7   PROT 7.1 7.0 7.0   ALBUMIN 1.8* 1.8* 2.0*   BILITOT 0.4 0.5 0.5   ALKPHOS 98 93 101   AST 11 10 14   ALT 9* 9* 8*   ANIONGAP 4* 2* 11   EGFRNONAA >60.0 >60.0 >60.0     Wound Culture: No results for input(s): LABAERO in the last 4320 hours.  All pertinent labs within the past 24 hours have been reviewed.    Significant Imaging: I have reviewed all pertinent imaging results/findings within the past 24 hours. MRI Thoracic Spine Without Contrast  Order: [id number]  Status:  Final result   Visible to patient:  No (inaccessible in Patient Portal) Next appt:  None  Details    Reading Physician Reading Date Result Priority   Sunny Swift MD  [phone] 10/8/2020 STAT      Narrative & Impression     EXAMINATION:  MRI THORACIC SPINE WITHOUT CONTRAST     CLINICAL HISTORY:  Back pain, evidence of discitis seen on a recent CT exam     COMPARISON:  None     FINDINGS:  MR images of the thoracic spine were obtained in multiple planes and sequences.  Thoracic body alignment is maintained.  There is fluid signal within the T9-T10 disc space along with destructive changes of the adjacent vertebral body endplates and associated marrow edema, suggestive of discitis and osteomyelitis.  Small ovoid areas of fluid signal within the soft tissues along the lateral aspects of the T9-T10 disc space bilaterally could reflect abscesses.  The collection on the left measures 2.7 x 1.5 cm and the collection on the right measures 3.8 x 2.4 cm.  Mild degenerative related signal change is seen along the endplates of multiple other thoracic bodies.  A few mild disc bulges are present at the upper thoracic spine which only mildly compress the thecal sac.  No significant central canal or foraminal stenosis identified through the  thoracic region.  Small bilateral pleural effusions incidentally noted.     Impression:     Evidence of discitis/osteomyelitis at T9-T10 with possible small associated abscesses within the adjacent soft tissues.     No significant central canal or foraminal stenosis identified of the thoracic spine.     Please see the above report for details and additional observations.

## 2020-10-09 NOTE — PROGRESS NOTES
Pharmacokinetic Initial Assessment: IV Vancomycin    Assessment/Plan:  - Patient received vancomycin 2.5g loading dose @ ~0100 this morning and another 2g @ 13:10  - Vancomycin random level result = 22.9 mcg/ml at 23:45 which is above the therapeutic range  - Initiate intravenous vancomycin maintenance dose of vancomycin 2500 mg IV every 24 hours to start early tomorrow morning. Given patients age, a higher mg/kg dose given q24h is more appropriate than a q12h regimen  - Desired empiric serum trough concentration is 15 to 20 mcg/mL  - Draw vancomycin trough level prior to 4th dose on 10/12 at approximately 0500  - Pharmacy will continue to follow and monitor vancomycin.      Please contact pharmacy at extension [phone] with any questions regarding this assessment.     Thank you for the consult,   Nneka Adhikari       Patient brief summary:  Martina Betancourt is a 70 y.o. female initiated on antimicrobial therapy with IV Vancomycin for treatment of suspected bacteremia    Drug Allergies:   Review of patient's allergies indicates:   Allergen Reactions    Hydroxyzine hcl     Tizanidine        Actual Body Weight:   137 kg    Renal Function:   CrCl cannot be calculated (Unknown ideal weight.).,       CBC (last 72 hours):  Recent Labs   Lab Result Units 10/07/20  1421 10/08/20  0557   WBC K/uL 14.55* 13.44*   Hemoglobin g/dL 10.6* 10.6*   Hematocrit % 35.4* 35.7*   Platelets K/uL 537* 527*   Gran% % 75.7* 76.3*   Lymph% % 13.4* 10.6*   Mono% % 10.4 12.1   Eosinophil% % 0.1 0.4   Basophil% % 0.1 0.2   Differential Method  Automated Automated       Metabolic Panel (last 72 hours):  Recent Labs   Lab Result Units 10/07/20  1421 10/07/20  1449 10/08/20  0557   Sodium mmol/L 138  --  136   Potassium mmol/L 3.6  --  3.1*   Chloride mmol/L 99  --  99   CO2 mmol/L 35*  --  35*   Glucose mg/dL 96  --  94   Glucose, UA   --  Negative  --    BUN, Bld mg/dL 8  --  7*   Creatinine mg/dL 0.5  --  0.5   Albumin g/dL 1.8*  --  1.8*   Total  Bilirubin mg/dL 0.4  --  0.5   Alkaline Phosphatase U/L 98  --  93   AST U/L 11  --  10   ALT U/L 9*  --  9*       Drug levels (last 3 results):  Recent Labs   Lab Result Units 10/08/20  2344   Vancomycin, Random ug/mL 22.9       Microbiologic Results:  Microbiology Results (last 7 days)     Procedure Component Value Units Date/Time    Blood culture [[id number]] Collected: 10/08/20 2344    Order Status: Sent Specimen: Blood Updated: 10/08/20 2350    Blood culture [[id number]] Collected: 10/08/20 2345    Order Status: Sent Specimen: Blood Updated: 10/08/20 2350

## 2020-10-10 LAB
ALBUMIN SERPL BCP-MCNC: 1.8 G/DL (ref 3.5–5.2)
ALP SERPL-CCNC: 90 U/L (ref 55–135)
ALT SERPL W/O P-5'-P-CCNC: 8 U/L (ref 10–44)
ANION GAP SERPL CALC-SCNC: 8 MMOL/L (ref 8–16)
AST SERPL-CCNC: 12 U/L (ref 10–40)
BASOPHILS # BLD AUTO: 0.03 K/UL (ref 0–0.2)
BASOPHILS NFR BLD: 0.3 % (ref 0–1.9)
BILIRUB SERPL-MCNC: 0.3 MG/DL (ref 0.1–1)
BUN SERPL-MCNC: 9 MG/DL (ref 8–23)
CALCIUM SERPL-MCNC: 9.2 MG/DL (ref 8.7–10.5)
CHLORIDE SERPL-SCNC: 96 MMOL/L (ref 95–110)
CO2 SERPL-SCNC: 34 MMOL/L (ref 23–29)
CREAT SERPL-MCNC: 0.7 MG/DL (ref 0.5–1.4)
DIFFERENTIAL METHOD: ABNORMAL
EOSINOPHIL # BLD AUTO: 0.1 K/UL (ref 0–0.5)
EOSINOPHIL NFR BLD: 1 % (ref 0–8)
ERYTHROCYTE [DISTWIDTH] IN BLOOD BY AUTOMATED COUNT: 16 % (ref 11.5–14.5)
EST. GFR  (AFRICAN AMERICAN): >60 ML/MIN/1.73 M^2
EST. GFR  (NON AFRICAN AMERICAN): >60 ML/MIN/1.73 M^2
GLUCOSE SERPL-MCNC: 92 MG/DL (ref 70–110)
HCT VFR BLD AUTO: 34.5 % (ref 37–48.5)
HGB BLD-MCNC: 10.2 G/DL (ref 12–16)
IMM GRANULOCYTES # BLD AUTO: 0.03 K/UL (ref 0–0.04)
IMM GRANULOCYTES NFR BLD AUTO: 0.3 % (ref 0–0.5)
LYMPHOCYTES # BLD AUTO: 1.6 K/UL (ref 1–4.8)
LYMPHOCYTES NFR BLD: 17.5 % (ref 18–48)
MCH RBC QN AUTO: 27.6 PG (ref 27–31)
MCHC RBC AUTO-ENTMCNC: 29.6 G/DL (ref 32–36)
MCV RBC AUTO: 94 FL (ref 82–98)
MONOCYTES # BLD AUTO: 1.2 K/UL (ref 0.3–1)
MONOCYTES NFR BLD: 12.4 % (ref 4–15)
NEUTROPHILS # BLD AUTO: 6.4 K/UL (ref 1.8–7.7)
NEUTROPHILS NFR BLD: 68.5 % (ref 38–73)
NRBC BLD-RTO: 0 /100 WBC
PLATELET # BLD AUTO: 443 K/UL (ref 150–350)
PMV BLD AUTO: 9.5 FL (ref 9.2–12.9)
POTASSIUM SERPL-SCNC: 3.1 MMOL/L (ref 3.5–5.1)
PROT SERPL-MCNC: 6.7 G/DL (ref 6–8.4)
RBC # BLD AUTO: 3.69 M/UL (ref 4–5.4)
SODIUM SERPL-SCNC: 138 MMOL/L (ref 136–145)
WBC # BLD AUTO: 9.3 K/UL (ref 3.9–12.7)

## 2020-10-10 PROCEDURE — 85025 COMPLETE CBC W/AUTO DIFF WBC: CPT

## 2020-10-10 PROCEDURE — 63600175 PHARM REV CODE 636 W HCPCS: Performed by: HOSPITALIST

## 2020-10-10 PROCEDURE — 11000001 HC ACUTE MED/SURG PRIVATE ROOM

## 2020-10-10 PROCEDURE — 99232 SBSQ HOSP IP/OBS MODERATE 35: CPT | Mod: ,,, | Performed by: INTERNAL MEDICINE

## 2020-10-10 PROCEDURE — 25000003 PHARM REV CODE 250: Performed by: INTERNAL MEDICINE

## 2020-10-10 PROCEDURE — 25000003 PHARM REV CODE 250: Performed by: HOSPITALIST

## 2020-10-10 PROCEDURE — 99232 PR SUBSEQUENT HOSPITAL CARE,LEVL II: ICD-10-PCS | Mod: ,,, | Performed by: INTERNAL MEDICINE

## 2020-10-10 PROCEDURE — 80053 COMPREHEN METABOLIC PANEL: CPT

## 2020-10-10 PROCEDURE — 63600175 PHARM REV CODE 636 W HCPCS: Performed by: INTERNAL MEDICINE

## 2020-10-10 PROCEDURE — 63600175 PHARM REV CODE 636 W HCPCS: Performed by: PHYSICIAN ASSISTANT

## 2020-10-10 PROCEDURE — 36415 COLL VENOUS BLD VENIPUNCTURE: CPT

## 2020-10-10 RX ADMIN — HYDROCODONE BITARTRATE AND ACETAMINOPHEN 1 TABLET: 10; 325 TABLET ORAL at 06:10

## 2020-10-10 RX ADMIN — VANCOMYCIN HYDROCHLORIDE 2500 MG: 1 INJECTION, POWDER, LYOPHILIZED, FOR SOLUTION INTRAVENOUS at 06:10

## 2020-10-10 RX ADMIN — DONEPEZIL HYDROCHLORIDE 10 MG: 10 TABLET ORAL at 09:10

## 2020-10-10 RX ADMIN — LISINOPRIL 2.5 MG: 2.5 TABLET ORAL at 09:10

## 2020-10-10 RX ADMIN — METOPROLOL SUCCINATE 50 MG: 50 TABLET, EXTENDED RELEASE ORAL at 09:10

## 2020-10-10 RX ADMIN — DULOXETINE HYDROCHLORIDE 60 MG: 30 CAPSULE, DELAYED RELEASE ORAL at 09:10

## 2020-10-10 RX ADMIN — ASPIRIN 81 MG: 81 TABLET, COATED ORAL at 09:10

## 2020-10-10 RX ADMIN — ENOXAPARIN SODIUM 40 MG: 40 INJECTION SUBCUTANEOUS at 04:10

## 2020-10-10 RX ADMIN — ISOSORBIDE MONONITRATE 60 MG: 30 TABLET, EXTENDED RELEASE ORAL at 01:10

## 2020-10-10 RX ADMIN — FUROSEMIDE 40 MG: 40 TABLET ORAL at 09:10

## 2020-10-10 RX ADMIN — CEFTRIAXONE SODIUM 2 G: 2 INJECTION, SOLUTION INTRAVENOUS at 08:10

## 2020-10-10 RX ADMIN — PRAVASTATIN SODIUM 40 MG: 40 TABLET ORAL at 09:10

## 2020-10-10 RX ADMIN — PANTOPRAZOLE SODIUM 40 MG: 40 TABLET, DELAYED RELEASE ORAL at 06:10

## 2020-10-10 RX ADMIN — HYDROCODONE BITARTRATE AND ACETAMINOPHEN 1 TABLET: 10; 325 TABLET ORAL at 07:10

## 2020-10-10 NOTE — PLAN OF CARE
Problem: Adult Inpatient Plan of Care  Goal: Plan of Care Review  Outcome: Ongoing, Progressing     Problem: Adult Inpatient Plan of Care  Goal: Optimal Comfort and Wellbeing  Outcome: Ongoing, Progressing     Problem: Adult Inpatient Plan of Care  Goal: Absence of Hospital-Acquired Illness or Injury  Outcome: Ongoing, Progressing     Problem: Infection (Pneumonia)  Goal: Resolution of Infection Signs/Symptoms  Outcome: Ongoing, Progressing

## 2020-10-10 NOTE — ASSESSMENT & PLAN NOTE
Osteodiscitis T9-10 with small soft tissue abscesses  - likely from bacteremia  - on vanc and zosyn  - dc zosyn and start ceftriaxone   - NSGY consult  - rec IR aspiration of abscesses if possible  - continue vanc

## 2020-10-10 NOTE — ASSESSMENT & PLAN NOTE
- possible passed renal stone with mild hydronephrosis on CT  - on ceftriaxone  - consider urology consult

## 2020-10-10 NOTE — CONSULTS
[facility]  Infectious Disease  Consult Note    Patient Name: Martina Betancourt  MRN: [id number]  Admission Date: 10/8/2020  Hospital Length of Stay: 1 days  Attending Physician: Tha Villarreal MD  Primary Care Provider: Joe Fuller Iii, MD     Isolation Status: Contact    Patient information was obtained from patient and ER records.      Consults  Assessment/Plan:     * Discitis  Osteodiscitis T9-10 with small soft tissue abscesses  - likely from bacteremia  - on vanc and zosyn  - dc zosyn and start ceftriaxone   - NSGY consult  - rec IR aspiration of abscesses if possible  - continue vanc      Hydronephrosis  - possible passed renal stone with mild hydronephrosis on CT  - on ceftriaxone  - consider urology consult    MRSA bacteremia  HX of mrsa bacteremia treated with Zyvox x 7d ending 10/1 at OSH  - likely bacteremia was undertreated and seeded back  - needs 2 D echo  - repeat bcxs ngtd        Thank you for your consult. I will follow-up with patient. Please contact us if you have any additional questions.    LAYO Kennedy  Infectious Disease  [facility]    Subjective:     Principal Problem: Discitis    HPI: Patient is a 70-year-old female with a history of recent hospitalization x2 including a Staph aureus bacteremia and then a subsequent pneumonia who at the last hospitalization about 1-2 weeks ago made a significant improvement with antibiotics return home with her daughter.  The patient reports she was doing well until about 24 hours ago began having excruciating flank pain.  In the ED she had imaging studies that showed a diskitis at the bottom thoracic spine levels and the concern of a possible epidural abscess/vertebral infection was entertained.  The case was discussed with an outside facility that had neurosurgical services and they felt that an MRI was needed before transfer.  She was transferred from and OSH.  The patient has some dementia and during  her last few hospitalizations was placed do not resuscitate because of her deteriorating condition.     Per dc summary from last hospitalization patient was treated from 9/23 to 10/1 with zyvox then dc'd.  Blood cultures cleared on 9/26.  And repeats 10/8 are NGTD.    MRI T spine obtained showing:  There is fluid signal within the T9-T10 disc space along with destructive changes of the adjacent vertebral body endplates and associated marrow edema, suggestive of discitis and osteomyelitis.  Small ovoid areas of fluid signal within the soft tissues along the lateral aspects of the T9-T10 disc space bilaterally could reflect abscesses.  The collection on the left measures 2.7 x 1.5 cm and the collection on the right measures 3.8 x 2.4 cm.  Mild degenerative related signal change is seen along the endplates of multiple other thoracic bodies.    CT renal stone study shows:  Destructive changes at the endplates of the T9 and T10 bodies, new when compared to the chest CT from 08/09/2020 and suspicious for discitis.  Mild-to-moderate right hydronephrosis along with multiple calcifications in the vicinity of the distal right ureter, most of which likely reflect calcified phleboliths.  One of these calcifications could represent a ureteral stone.  Small bilateral pleural effusions with adjacent atelectasis/infiltrates.    Patient afebrile and WBC 13s.  NSGY consulted. On Vanc and Zosyn.  ID consulted for abx recs.  Patient co of sever back pain.  She denies dysuria, fever, chills and sweats.    Past Medical History:   Diagnosis Date    Alzheimer disease     Alzheimer disease     Coronary artery disease     Hyperlipidemia     Hypertension     Myopathy     Non-ST elevation (NSTEMI) myocardial infarction     Obesity     Pneumonia     Sleep apnea        Past Surgical History:   Procedure Laterality Date    APPENDECTOMY      APPENDECTOMY      CHOLECYSTECTOMY      CORONARY STENT PLACEMENT      HYSTERECTOMY       TONSILLECTOMY         Review of patient's allergies indicates:   Allergen Reactions    Hydroxyzine hcl     Tizanidine        Medications:  Medications Prior to Admission   Medication Sig    acetaminophen (TYLENOL) 325 MG tablet Take 2 tablets (650 mg total) by mouth every 6 (six) hours as needed (HEADACHE, TEMPERATURE - FEVER > 100.4).    albuterol-ipratropium (DUO-NEB) 2.5 mg-0.5 mg/3 mL nebulizer solution Take 3 mLs by nebulization every 6 (six) hours as needed for Wheezing or Shortness of Breath. Rescue    aspirin (ECOTRIN) 81 MG EC tablet Take 81 mg by mouth once daily.    clopidogreL (PLAVIX) 75 mg tablet Take 1 tablet (75 mg total) by mouth once daily.    donepeziL (ARICEPT) 10 MG tablet Take 10 mg by mouth every evening.    DULoxetine (CYMBALTA) 60 MG capsule Take 60 mg by mouth once daily.    ferrous sulfate 324 mg (65 mg iron) TbEC Take 325 mg by mouth every evening.    furosemide (LASIX) 40 MG tablet Take 1 tablet (40 mg total) by mouth once daily.    isosorbide mononitrate (IMDUR) 60 MG 24 hr tablet Take 60 mg by mouth once daily.    lisinopriL (PRINIVIL,ZESTRIL) 2.5 MG tablet Take 1 tablet (2.5 mg total) by mouth once daily.    memantine (NAMENDA XR) 28 mg CSpX Take 28 mg by mouth once daily.    metoprolol succinate (TOPROL-XL) 50 MG 24 hr tablet Take 50 mg by mouth once daily.    miconazole nitrate 2% (MICOTIN) 2 % Oint Apply topically 2 (two) times daily.    pantoprazole (PROTONIX) 40 MG tablet Take 1 tablet (40 mg total) by mouth before breakfast.    pravastatin (PRAVACHOL) 40 MG tablet Take 40 mg by mouth every evening.     Antibiotics (From admission, onward)    Start     Stop Route Frequency Ordered    10/09/20 0600  vancomycin (VANCOCIN) 2,500 mg in dextrose 5 % 500 mL IVPB      -- IV Every 24 hours (non-standard times) 10/09/20 0039    10/09/20 0000  piperacillin-tazobactam 4.5 g in sodium chloride 0.9% 100 mL IVPB (ready to mix system)      -- IV Every 8 hours (non-standard  times) 10/08/20 2247    10/08/20 2334  vancomycin - pharmacy to dose  (vancomycin IVPB)      -- IV pharmacy to manage frequency 10/08/20 2234        Antifungals (From admission, onward)    None        Antivirals (From admission, onward)    None           Immunization History   Administered Date(s) Administered    Influenza (FLUAD) - Quadrivalent - Adjuvanted - PF *Preferred* (65+) 10/01/2020    PPD Test 07/29/2020       Family History     None        Social History     Socioeconomic History    Marital status:      Spouse name: Not on file    Number of children: Not on file    Years of education: Not on file    Highest education level: Not on file   Occupational History    Not on file   Social Needs    Financial resource strain: Not on file    Food insecurity     Worry: Not on file     Inability: Not on file    Transportation needs     Medical: Not on file     Non-medical: Not on file   Tobacco Use    Smoking status: Unknown If Ever Smoked   Substance and Sexual Activity    Alcohol use: Not Currently    Drug use: Never    Sexual activity: Not Currently   Lifestyle    Physical activity     Days per week: Not on file     Minutes per session: Not on file    Stress: Not on file   Relationships    Social connections     Talks on phone: Not on file     Gets together: Not on file     Attends Congregation service: Not on file     Active member of club or organization: Not on file     Attends meetings of clubs or organizations: Not on file     Relationship status: Not on file   Other Topics Concern    Not on file   Social History Narrative    Not on file     Review of Systems   Constitutional: Negative for appetite change, chills, diaphoresis, fatigue, fever and unexpected weight change.   HENT: Negative for congestion, ear pain, hearing loss, sore throat and tinnitus.    Eyes: Negative for pain, redness and visual disturbance.   Respiratory: Negative for cough, chest tightness, shortness of breath and  wheezing.    Cardiovascular: Negative for chest pain.   Gastrointestinal: Negative for abdominal pain, constipation, diarrhea, nausea and vomiting.   Endocrine: Negative for cold intolerance and heat intolerance.   Genitourinary: Negative for decreased urine volume, difficulty urinating, dysuria, flank pain, frequency, hematuria and urgency.   Musculoskeletal: Positive for back pain. Negative for arthralgias, myalgias and neck pain.   Skin: Negative for rash and wound.   Allergic/Immunologic: Negative for environmental allergies, food allergies and immunocompromised state.   Neurological: Negative for dizziness, facial asymmetry, weakness, light-headedness, numbness and headaches.   Hematological: Negative for adenopathy. Does not bruise/bleed easily.   Psychiatric/Behavioral: Negative for agitation, behavioral problems and confusion.     Objective:     Vital Signs (Most Recent):  Temp: 98.2 °F (36.8 °C) (10/09/20 0853)  Pulse: 64 (10/09/20 0900)  Resp: 19 (10/09/20 0854)  BP: (!) 144/81 (10/09/20 0854)  SpO2: 96 % (10/09/20 0854) Vital Signs (24h Range):  Temp:  [97.7 °F (36.5 °C)-98.2 °F (36.8 °C)] 98.2 °F (36.8 °C)  Pulse:  [60-66] 64  Resp:  [15-22] 19  SpO2:  [90 %-96 %] 96 %  BP: (129-160)/(63-81) 144/81     Weight: (!) 137 kg (302 lb 0.5 oz)  Body mass index is 43.34 kg/m².    Estimated Creatinine Clearance: 110 mL/min (based on SCr of 0.7 mg/dL).    Physical Exam  Constitutional:       General: She is not in acute distress.     Appearance: She is well-developed. She is not diaphoretic.       HENT:      Head: Normocephalic and atraumatic.   Cardiovascular:      Rate and Rhythm: Normal rate and regular rhythm.      Heart sounds: Normal heart sounds. No murmur. No friction rub. No gallop.    Pulmonary:      Effort: Pulmonary effort is normal. No respiratory distress.      Breath sounds: Normal breath sounds. No wheezing or rales.   Abdominal:      General: Bowel sounds are normal. There is no distension.       Palpations: Abdomen is soft. There is no mass.      Tenderness: There is no abdominal tenderness. There is no guarding or rebound.   Skin:     General: Skin is warm and dry.   Neurological:      Mental Status: She is alert and oriented to person, place, and time.   Psychiatric:         Behavior: Behavior normal.         Significant Labs:   Blood Culture:   Recent Labs   Lab 09/23/20  1422 09/23/20  1433 09/26/20  1452 10/08/20  2344 10/08/20  2345   LABBLOO Gram stain dale bottle: Gram positive cocci in clusters resembling Staph   Results called to and read back by:Parag Galvan RN 09/24/2020  11:30  Gram stain aer bottle: Gram positive cocci in clusters resembling Staph  Positive results previously called 09/24/2020  13:53  METHICILLIN RESISTANT STAPHYLOCOCCUS AUREUS  ID consult required at [facility] and [facility] locations.  * Gram stain dale bottle: Gram positive cocci in clusters resembling Staph   Results called to and read back by:Parag Galvan RN 09/24/2020  11:30  Gram stain aer bottle: Gram positive cocci in clusters resembling Staph   Positive results previously called 09/24/2020  13:51  METHICILLIN RESISTANT STAPHYLOCOCCUS AUREUS  ID consult required at [facility] and [facility] locations.  For susceptibility see order #[id number]  * No growth after 5 days. No Growth to date No Growth to date     CBC:   Recent Labs   Lab 10/07/20  1421 10/08/20  0557 10/09/20  0439   WBC 14.55* 13.44* 13.25*   HGB 10.6* 10.6* 11.0*   HCT 35.4* 35.7* 37.5   * 527* 510*     CMP:   Recent Labs   Lab 10/07/20  1421 10/08/20  0557 10/09/20  0439    136 142   K 3.6 3.1* 3.6   CL 99 99 98   CO2 35* 35* 33*   GLU 96 94 95   BUN 8 7* 9   CREATININE 0.5 0.5 0.7   CALCIUM 9.5 9.3 9.7   PROT 7.1 7.0 7.0   ALBUMIN 1.8* 1.8* 2.0*   BILITOT 0.4 0.5 0.5   ALKPHOS 98 93 101   AST 11 10 14   ALT 9* 9* 8*   ANIONGAP 4* 2* 11   EGFRNONAA >60.0 >60.0 >60.0     Wound Culture: No results for input(s): LABAERO in  the last 4320 hours.  All pertinent labs within the past 24 hours have been reviewed.    Significant Imaging: I have reviewed all pertinent imaging results/findings within the past 24 hours. MRI Thoracic Spine Without Contrast  Order: [id number]  Status:  Final result   Visible to patient:  No (inaccessible in Patient Portal) Next appt:  None  Details    Reading Physician Reading Date Result Priority   Sunny Swift MD  [phone] 10/8/2020 STAT      Narrative & Impression     EXAMINATION:  MRI THORACIC SPINE WITHOUT CONTRAST     CLINICAL HISTORY:  Back pain, evidence of discitis seen on a recent CT exam     COMPARISON:  None     FINDINGS:  MR images of the thoracic spine were obtained in multiple planes and sequences.  Thoracic body alignment is maintained.  There is fluid signal within the T9-T10 disc space along with destructive changes of the adjacent vertebral body endplates and associated marrow edema, suggestive of discitis and osteomyelitis.  Small ovoid areas of fluid signal within the soft tissues along the lateral aspects of the T9-T10 disc space bilaterally could reflect abscesses.  The collection on the left measures 2.7 x 1.5 cm and the collection on the right measures 3.8 x 2.4 cm.  Mild degenerative related signal change is seen along the endplates of multiple other thoracic bodies.  A few mild disc bulges are present at the upper thoracic spine which only mildly compress the thecal sac.  No significant central canal or foraminal stenosis identified through the thoracic region.  Small bilateral pleural effusions incidentally noted.     Impression:     Evidence of discitis/osteomyelitis at T9-T10 with possible small associated abscesses within the adjacent soft tissues.     No significant central canal or foraminal stenosis identified of the thoracic spine.     Please see the above report for details and additional observations.

## 2020-10-10 NOTE — PROGRESS NOTES
Hospital Medicine  Progress note    Team: [facility] Tha Villarreal MD  Admit Date: 10/8/2020  BRIT 10/12/2020  Length of Stay:  LOS: 2 days   Code status: Full Code    Principal Problem:  Discitis    HPI / Hospital Course     Interval hx:  10/10 Zosyn stopped, rocephin started. Neurosurgery consult pending. WBC -9, afebrile, BC=NGTD    10/09 await ID consult. VSS, WBC is 13.2. BC-NGTD    ROS     Respiratory: neg for cough neg for shortness of breath  Cardiovascular: neg for chest pain neg for palpitations  Gastrointestinal: neg for nausea neg for vomiting, neg for abdominal pain neg for diarrhea neg for constipation   Behavioral/Psych: neg for depression neg for anxiety    PEx  Temp:  [97.9 °F (36.6 °C)-98.5 °F (36.9 °C)]   Pulse:  [63-65]   Resp:  [17-27]   BP: (139-149)/(78-87)   SpO2:  [91 %-98 %]     Intake/Output Summary (Last 24 hours) at 10/10/2020 0814  Last data filed at 10/10/2020 0600  Gross per 24 hour   Intake --   Output 350 ml   Net -350 ml       General Appearance: no acute distress   Heart: regular rate and rhythm  Respiratory: Normal respiratory effort, no crackles   Abdomen: Soft, non-tender; bowel sounds active  Skin: intact.Skin intact  Neurologic:  No focal numbness or weakness  Mental status: Alert, oriented x 4, affect appropriate     Recent Labs   Lab 10/08/20  0557 10/09/20  0439 10/10/20  0625   WBC 13.44* 13.25* 9.30   HGB 10.6* 11.0* 10.2*   HCT 35.7* 37.5 34.5*   * 510* 443*     Recent Labs   Lab 10/08/20  0557 10/09/20  0439 10/10/20  0625    142 138   K 3.1* 3.6 3.1*   CL 99 98 96   CO2 35* 33* 34*   BUN 7* 9 9   CREATININE 0.5 0.7 0.7   GLU 94 95 92   CALCIUM 9.3 9.7 9.2     Recent Labs   Lab 10/08/20  0557 10/09/20  0439 10/10/20  0625   ALKPHOS 93 101 90   ALT 9* 8* 8*   AST 10 14 12   ALBUMIN 1.8* 2.0* 1.8*   PROT 7.0 7.0 6.7   BILITOT 0.5 0.5 0.3        No results for input(s): POCTGLUCOSE in the last 168 hours.    Scheduled Meds:   aspirin  81 mg Oral Daily     cefTRIAXone (ROCEPHIN) IVPB  2 g Intravenous Q24H    donepeziL  10 mg Oral QHS    DULoxetine  60 mg Oral Daily    enoxaparin  40 mg Subcutaneous Q24H    furosemide  40 mg Oral Daily    isosorbide mononitrate  60 mg Oral Daily    lisinopriL  2.5 mg Oral Daily    metoprolol succinate  50 mg Oral Daily    pantoprazole  40 mg Oral Before breakfast    pravastatin  40 mg Oral QHS    vancomycin (VANCOCIN) IVPB  2,500 mg Intravenous Q24H     Continuous Infusions:  As Needed:  albuterol-ipratropium, glucose, glucose, HYDROcodone-acetaminophen, HYDROmorphone, melatonin, ondansetron, prochlorperazine, sodium chloride 0.9%, Pharmacy to dose Vancomycin consult **AND** vancomycin - pharmacy to dose    ** update problem list    Active Hospital Problems    Diagnosis  POA    *Discitis [M46.40]  Yes    Hydronephrosis [N13.30]  Yes    HTN (hypertension) [I10]  Unknown    CAD (coronary artery disease) [I25.10]  Unknown    MRSA bacteremia [R78.81, B95.62]  Yes    Dementia without behavioral disturbance [F03.90]  Yes      Resolved Hospital Problems   No resolved problems to display.       Assessment and Plan  / Problems managed today    MRSA Bacteremia  -Pt. With recent hx of MRSA bacteremia presents with back pain. MRI shows  Evidence of discitis/osteomyelitis at T9-T10 with possible small associated abscesses within the adjacent soft tissues  -Neurosurgery consulted regarding need for intervention  -Continue broad spectrum abx with vanc/zosyn therapy for now, but strong suspicion for MRSA as cause.  -Blood cultures ordered as they were not drawn at OSH prior to transfer (note that abx have already been given)  -Consult ID for further recommendations regarding need for zosyn     HTN  -Continue home BP meds     Dementia without behavioral disturbance  -Continue donepezil PRN  -Delirium precautions     CAD  -Pt. With recent NSTEMI during admission last month. Was deemed Poor cath lab candidate - dementia/DNR  -Continue  GDMT with ASA, b-blocker, ACEi, and statin. Will hold plavix for now as pt. Does not have an absolute indication until neurosurgery evaluates need for intervention     DVT PPx: Lovenox           :    Goals of Care:  Return to prior functional status    Discharge plan:    Time (minutes) spent in care of the patient (Greater than 1/2 spent in direct face-to-face contact)  35 minutes    Tha Villarreal MD

## 2020-10-10 NOTE — ASSESSMENT & PLAN NOTE
HX of mrsa bacteremia treated with Zyvox x 7d ending 10/1 at OSH  - likely bacteremia was undertreated and seeded back  - needs 2 D echo  - repeat bcxs ngtd

## 2020-10-10 NOTE — NURSING
IV access was lost after vanc infiltration. Warm compressed placed and arm elevated. IV access was restored ~1600. MD made aware of this finding. No further intervention was recommended by pharmacy and MD. Will continue to monitor for changes in condition.

## 2020-10-11 LAB
ALBUMIN SERPL BCP-MCNC: 1.9 G/DL (ref 3.5–5.2)
ALP SERPL-CCNC: 89 U/L (ref 55–135)
ALT SERPL W/O P-5'-P-CCNC: 7 U/L (ref 10–44)
ANION GAP SERPL CALC-SCNC: 8 MMOL/L (ref 8–16)
AST SERPL-CCNC: 12 U/L (ref 10–40)
BASOPHILS # BLD AUTO: 0.05 K/UL (ref 0–0.2)
BASOPHILS NFR BLD: 0.6 % (ref 0–1.9)
BILIRUB SERPL-MCNC: 0.2 MG/DL (ref 0.1–1)
BUN SERPL-MCNC: 8 MG/DL (ref 8–23)
CALCIUM SERPL-MCNC: 9.7 MG/DL (ref 8.7–10.5)
CHLORIDE SERPL-SCNC: 96 MMOL/L (ref 95–110)
CO2 SERPL-SCNC: 36 MMOL/L (ref 23–29)
CREAT SERPL-MCNC: 0.7 MG/DL (ref 0.5–1.4)
DIFFERENTIAL METHOD: ABNORMAL
EOSINOPHIL # BLD AUTO: 0.1 K/UL (ref 0–0.5)
EOSINOPHIL NFR BLD: 1.4 % (ref 0–8)
ERYTHROCYTE [DISTWIDTH] IN BLOOD BY AUTOMATED COUNT: 15.7 % (ref 11.5–14.5)
EST. GFR  (AFRICAN AMERICAN): >60 ML/MIN/1.73 M^2
EST. GFR  (NON AFRICAN AMERICAN): >60 ML/MIN/1.73 M^2
GLUCOSE SERPL-MCNC: 87 MG/DL (ref 70–110)
HCT VFR BLD AUTO: 34.5 % (ref 37–48.5)
HGB BLD-MCNC: 9.9 G/DL (ref 12–16)
IMM GRANULOCYTES # BLD AUTO: 0.04 K/UL (ref 0–0.04)
IMM GRANULOCYTES NFR BLD AUTO: 0.5 % (ref 0–0.5)
LYMPHOCYTES # BLD AUTO: 1.9 K/UL (ref 1–4.8)
LYMPHOCYTES NFR BLD: 21.8 % (ref 18–48)
MCH RBC QN AUTO: 27 PG (ref 27–31)
MCHC RBC AUTO-ENTMCNC: 28.7 G/DL (ref 32–36)
MCV RBC AUTO: 94 FL (ref 82–98)
MONOCYTES # BLD AUTO: 1.2 K/UL (ref 0.3–1)
MONOCYTES NFR BLD: 14.1 % (ref 4–15)
NEUTROPHILS # BLD AUTO: 5.4 K/UL (ref 1.8–7.7)
NEUTROPHILS NFR BLD: 61.6 % (ref 38–73)
NRBC BLD-RTO: 0 /100 WBC
PLATELET # BLD AUTO: 509 K/UL (ref 150–350)
PMV BLD AUTO: 9.5 FL (ref 9.2–12.9)
POCT GLUCOSE: 119 MG/DL (ref 70–110)
POTASSIUM SERPL-SCNC: 2.9 MMOL/L (ref 3.5–5.1)
PROT SERPL-MCNC: 6.7 G/DL (ref 6–8.4)
RBC # BLD AUTO: 3.66 M/UL (ref 4–5.4)
SODIUM SERPL-SCNC: 140 MMOL/L (ref 136–145)
VANCOMYCIN TROUGH SERPL-MCNC: 26.2 UG/ML (ref 10–22)
WBC # BLD AUTO: 8.81 K/UL (ref 3.9–12.7)

## 2020-10-11 PROCEDURE — 63600175 PHARM REV CODE 636 W HCPCS: Performed by: INTERNAL MEDICINE

## 2020-10-11 PROCEDURE — 11000001 HC ACUTE MED/SURG PRIVATE ROOM

## 2020-10-11 PROCEDURE — 63600175 PHARM REV CODE 636 W HCPCS: Performed by: PHYSICIAN ASSISTANT

## 2020-10-11 PROCEDURE — 80053 COMPREHEN METABOLIC PANEL: CPT

## 2020-10-11 PROCEDURE — 36415 COLL VENOUS BLD VENIPUNCTURE: CPT

## 2020-10-11 PROCEDURE — 94799 UNLISTED PULMONARY SVC/PX: CPT

## 2020-10-11 PROCEDURE — 99232 PR SUBSEQUENT HOSPITAL CARE,LEVL II: ICD-10-PCS | Mod: ,,, | Performed by: INTERNAL MEDICINE

## 2020-10-11 PROCEDURE — 27000221 HC OXYGEN, UP TO 24 HOURS

## 2020-10-11 PROCEDURE — 99232 SBSQ HOSP IP/OBS MODERATE 35: CPT | Mod: ,,, | Performed by: INTERNAL MEDICINE

## 2020-10-11 PROCEDURE — 80202 ASSAY OF VANCOMYCIN: CPT

## 2020-10-11 PROCEDURE — 94761 N-INVAS EAR/PLS OXIMETRY MLT: CPT

## 2020-10-11 PROCEDURE — 25000003 PHARM REV CODE 250: Performed by: INTERNAL MEDICINE

## 2020-10-11 PROCEDURE — 63600175 PHARM REV CODE 636 W HCPCS: Performed by: HOSPITALIST

## 2020-10-11 PROCEDURE — 85025 COMPLETE CBC W/AUTO DIFF WBC: CPT

## 2020-10-11 PROCEDURE — 25000003 PHARM REV CODE 250: Performed by: HOSPITALIST

## 2020-10-11 RX ADMIN — PRAVASTATIN SODIUM 40 MG: 40 TABLET ORAL at 09:10

## 2020-10-11 RX ADMIN — CEFTRIAXONE SODIUM 2 G: 2 INJECTION, SOLUTION INTRAVENOUS at 09:10

## 2020-10-11 RX ADMIN — HYDROCODONE BITARTRATE AND ACETAMINOPHEN 1 TABLET: 10; 325 TABLET ORAL at 12:10

## 2020-10-11 RX ADMIN — ENOXAPARIN SODIUM 40 MG: 40 INJECTION SUBCUTANEOUS at 05:10

## 2020-10-11 RX ADMIN — ASPIRIN 81 MG: 81 TABLET, COATED ORAL at 10:10

## 2020-10-11 RX ADMIN — FUROSEMIDE 40 MG: 40 TABLET ORAL at 10:10

## 2020-10-11 RX ADMIN — METOPROLOL SUCCINATE 50 MG: 50 TABLET, EXTENDED RELEASE ORAL at 10:10

## 2020-10-11 RX ADMIN — LISINOPRIL 2.5 MG: 2.5 TABLET ORAL at 10:10

## 2020-10-11 RX ADMIN — ISOSORBIDE MONONITRATE 60 MG: 30 TABLET, EXTENDED RELEASE ORAL at 10:10

## 2020-10-11 RX ADMIN — VANCOMYCIN HYDROCHLORIDE 2500 MG: 1 INJECTION, POWDER, LYOPHILIZED, FOR SOLUTION INTRAVENOUS at 05:10

## 2020-10-11 RX ADMIN — PANTOPRAZOLE SODIUM 40 MG: 40 TABLET, DELAYED RELEASE ORAL at 05:10

## 2020-10-11 RX ADMIN — HYDROCODONE BITARTRATE AND ACETAMINOPHEN 1 TABLET: 10; 325 TABLET ORAL at 03:10

## 2020-10-11 RX ADMIN — HYDROCODONE BITARTRATE AND ACETAMINOPHEN 1 TABLET: 10; 325 TABLET ORAL at 09:10

## 2020-10-11 RX ADMIN — DULOXETINE HYDROCHLORIDE 60 MG: 30 CAPSULE, DELAYED RELEASE ORAL at 10:10

## 2020-10-11 RX ADMIN — DONEPEZIL HYDROCHLORIDE 10 MG: 10 TABLET ORAL at 09:10

## 2020-10-11 NOTE — PROGRESS NOTES
Hospital Medicine  Progress note    Team: [facility] A Tha Villarreal MD  Admit Date: 10/8/2020  BRIT 10/12/2020  Length of Stay:  LOS: 3 days   Code status: Full Code    Principal Problem:  Discitis    HPI / Hospital Course     Interval hx:  10/11 Zosyn stopped, rocephin started. Neurosurgery consult pending. WBC -8.8, afebrile, BC- NGTD    10/09 await ID consult. VSS, WBC is 13.2. BC-NGTD    ROS     Respiratory: neg for cough neg for shortness of breath  Cardiovascular: neg for chest pain neg for palpitations  Gastrointestinal: neg for nausea neg for vomiting, neg for abdominal pain neg for diarrhea neg for constipation   Behavioral/Psych: neg for depression neg for anxiety    PEx  Temp:  [97.7 °F (36.5 °C)-98.8 °F (37.1 °C)]   Pulse:  [65-99]   Resp:  [17-21]   BP: (133-147)/(69-83)   SpO2:  [92 %-96 %]     Intake/Output Summary (Last 24 hours) at 10/11/2020 0826  Last data filed at 10/11/2020 0400  Gross per 24 hour   Intake --   Output 800 ml   Net -800 ml       General Appearance: no acute distress   Heart: regular rate and rhythm  Respiratory: Normal respiratory effort, no crackles   Abdomen: Soft, non-tender; bowel sounds active  Skin: intact.Skin intact  Neurologic:  No focal numbness or weakness  Mental status: Alert, oriented x 4, affect appropriate     Recent Labs   Lab 10/09/20  0439 10/10/20  0625 10/11/20  0542   WBC 13.25* 9.30 8.81   HGB 11.0* 10.2* 9.9*   HCT 37.5 34.5* 34.5*   * 443* 509*     Recent Labs   Lab 10/09/20  0439 10/10/20  0625 10/11/20  0541    138 140   K 3.6 3.1* 2.9*   CL 98 96 96   CO2 33* 34* 36*   BUN 9 9 8   CREATININE 0.7 0.7 0.7   GLU 95 92 87   CALCIUM 9.7 9.2 9.7     Recent Labs   Lab 10/09/20  0439 10/10/20  0625 10/11/20  0541   ALKPHOS 101 90 89   ALT 8* 8* 7*   AST 14 12 12   ALBUMIN 2.0* 1.8* 1.9*   PROT 7.0 6.7 6.7   BILITOT 0.5 0.3 0.2        No results for input(s): POCTGLUCOSE in the last 168 hours.    Scheduled Meds:   aspirin  81 mg Oral Daily     cefTRIAXone (ROCEPHIN) IVPB  2 g Intravenous Q24H    donepeziL  10 mg Oral QHS    DULoxetine  60 mg Oral Daily    enoxaparin  40 mg Subcutaneous Q24H    furosemide  40 mg Oral Daily    isosorbide mononitrate  60 mg Oral Daily    lisinopriL  2.5 mg Oral Daily    metoprolol succinate  50 mg Oral Daily    pantoprazole  40 mg Oral Before breakfast    pravastatin  40 mg Oral QHS    vancomycin (VANCOCIN) IVPB  2,500 mg Intravenous Q24H     Continuous Infusions:  As Needed:  albuterol-ipratropium, glucose, glucose, HYDROcodone-acetaminophen, HYDROmorphone, melatonin, ondansetron, prochlorperazine, sodium chloride 0.9%, Pharmacy to dose Vancomycin consult **AND** vancomycin - pharmacy to dose    ** update problem list    Active Hospital Problems    Diagnosis  POA    *Discitis [M46.40]  Yes    Hydronephrosis [N13.30]  Yes    HTN (hypertension) [I10]  Unknown    CAD (coronary artery disease) [I25.10]  Unknown    MRSA bacteremia [R78.81, B95.62]  Yes    Dementia without behavioral disturbance [F03.90]  Yes      Resolved Hospital Problems   No resolved problems to display.       Assessment and Plan  / Problems managed today    MRSA Bacteremia  -Pt. With recent hx of MRSA bacteremia presents with back pain. MRI shows  Evidence of discitis/osteomyelitis at T9-T10 with possible small associated abscesses within the adjacent soft tissues  -Neurosurgery consulted regarding need for intervention  -Continue broad spectrum abx with vanc/zosyn therapy for now, but strong suspicion for MRSA as cause.  -Blood cultures ordered as they were not drawn at OSH prior to transfer (note that abx have already been given)  -Consult ID for further recommendations regarding need for zosyn     HTN  -Continue home BP meds     Dementia without behavioral disturbance  -Continue donepezil PRN  -Delirium precautions     CAD  -Pt. With recent NSTEMI during admission last month. Was deemed Poor cath lab candidate - dementia/DNR  -Continue  GDMT with ASA, b-blocker, ACEi, and statin. Will hold plavix for now as pt. Does not have an absolute indication until neurosurgery evaluates need for intervention     DVT PPx: Lovenox           :    Goals of Care:  Return to prior functional status    Discharge plan:    Time (minutes) spent in care of the patient (Greater than 1/2 spent in direct face-to-face contact)  35 minutes    Tah Villarreal MD

## 2020-10-11 NOTE — PLAN OF CARE
Problem: Respiratory Compromise (Pneumonia)  Goal: Effective Oxygenation and Ventilation  Outcome: Ongoing, Progressing     Problem: Fall Injury Risk  Goal: Absence of Fall and Fall-Related Injury  Outcome: Ongoing, Progressing

## 2020-10-11 NOTE — NURSING
Plan  Of care reviewed with patient. Patient demonstrated understanding of plan of care. VSS throughout shift. Patient complained of constant lower back pain. PRN pain meds  given. Mild relief obtained. IV antibiotics given. Fall and aspiration precautions maintained. SCDs checked and maintained throughout shift. Patient turned q2 and frequent turns were encouraged. Bed locked and low, patient belongings and call light within reach. Wctm.

## 2020-10-11 NOTE — PROGRESS NOTES
Pharmacokinetic Assessment Follow Up: IV Vancomycin    Vancomycin serum concentration assessment(s):    -Vancomycin trough was drawn ~ 23 hrs from the previous dose and can be used to guide therapy.  -A level of 26.2 mcg/mL is above therapeutic goal 15-20 mcg/mL for osteodiscitis.  -Renal function remains stable.    Vancomycin Regimen Plan:    -Obtain a random level with AM labs tomorrow prior to re-dosing.   -Reduce regimen to 1750 mg IV Q24H.  -Obtain a trough prior to the 3rd dose on 10/14 @ 0445.   -PK estimates: AUC: RUBY of 588 mcg*hr/mL, with an est peak of 17 mcg/mL.    Drug levels (last 3 results):  Recent Labs   Lab Result Units 10/08/20  2344 10/11/20  0542   Vancomycin, Random ug/mL 22.9  --    Vancomycin-Trough ug/mL  --  26.2*       Pharmacy will continue to follow and monitor vancomycin.    Please contact pharmacy at extension [phone] for questions regarding this assessment.    Thank you for the consult,   Leif Hernandez       Patient brief summary:  Martina Betancourt is a 70 y.o. female initiated on antimicrobial therapy with IV Vancomycin for treatment of bone/joint infection    Drug Allergies:   Review of patient's allergies indicates:   Allergen Reactions    Hydroxyzine hcl     Tizanidine        Actual Body Weight:   137 kg  Renal Function:   Estimated Creatinine Clearance: 110 mL/min (based on SCr of 0.7 mg/dL).,     Dialysis Method (if applicable):  N/A    CBC (last 72 hours):  Recent Labs   Lab Result Units 10/09/20  0439 10/10/20  0625 10/11/20  0542   WBC K/uL 13.25* 9.30 8.81   Hemoglobin g/dL 11.0* 10.2* 9.9*   Hematocrit % 37.5 34.5* 34.5*   Platelets K/uL 510* 443* 509*   Gran% % 71.5 68.5 61.6   Lymph% % 15.0* 17.5* 21.8   Mono% % 12.5 12.4 14.1   Eosinophil% % 0.5 1.0 1.4   Basophil% % 0.2 0.3 0.6   Differential Method  Automated Automated Automated       Metabolic Panel (last 72 hours):  Recent Labs   Lab Result Units 10/09/20  0439 10/10/20  0625 10/11/20  0541   Sodium mmol/L 142 138  140   Potassium mmol/L 3.6 3.1* 2.9*   Chloride mmol/L 98 96 96   CO2 mmol/L 33* 34* 36*   Glucose mg/dL 95 92 87   BUN, Bld mg/dL 9 9 8   Creatinine mg/dL 0.7 0.7 0.7   Albumin g/dL 2.0* 1.8* 1.9*   Total Bilirubin mg/dL 0.5 0.3 0.2   Alkaline Phosphatase U/L 101 90 89   AST U/L 14 12 12   ALT U/L 8* 8* 7*       Vancomycin Administrations:  vancomycin given in the last 96 hours                   vancomycin (VANCOCIN) 2,500 mg in dextrose 5 % 500 mL IVPB (mg) 2,500 mg New Bag 10/11/20 0548     2,500 mg New Bag 10/10/20 0649     2,500 mg New Bag 10/09/20 0539    vancomycin (VANCOCIN) 2,000 mg in dextrose 5 % 500 mL IVPB (mg) 2,000 mg New Bag 10/08/20 1310    VANCOMYCIN 1.25 G/250 ML D5W (READY TO MIX) IVPB (g) 1.25 g New Bag 10/08/20 0146    vancomycin 1.25 g in dextrose 5% 250 mL IVPB (ready to mix) (mg) 1,250 mg New Bag 10/08/20 0021                Microbiologic Results:  Microbiology Results (last 7 days)     Procedure Component Value Units Date/Time    Blood culture [[id number]] Collected: 10/08/20 2345    Order Status: Completed Specimen: Blood Updated: 10/11/20 0612     Blood Culture, Routine No Growth to date      No Growth to date      No Growth to date    Blood culture [[id number]] Collected: 10/08/20 2344    Order Status: Completed Specimen: Blood Updated: 10/11/20 0612     Blood Culture, Routine No Growth to date      No Growth to date      No Growth to date

## 2020-10-11 NOTE — PLAN OF CARE
Problem: Adult Inpatient Plan of Care  Goal: Plan of Care Review  Outcome: Ongoing, Progressing     Problem: Adult Inpatient Plan of Care  Goal: Absence of Hospital-Acquired Illness or Injury  Outcome: Ongoing, Progressing     Problem: Adult Inpatient Plan of Care  Goal: Optimal Comfort and Wellbeing  Outcome: Ongoing, Progressing     Problem: Bariatric Environmental Safety  Goal: Safety Maintained with Care  Outcome: Ongoing, Progressing

## 2020-10-12 LAB
ALBUMIN SERPL BCP-MCNC: 1.9 G/DL (ref 3.5–5.2)
ALP SERPL-CCNC: 84 U/L (ref 55–135)
ALT SERPL W/O P-5'-P-CCNC: 7 U/L (ref 10–44)
ANION GAP SERPL CALC-SCNC: 9 MMOL/L (ref 8–16)
ASCENDING AORTA: 3.67 CM
AST SERPL-CCNC: 9 U/L (ref 10–40)
AV INDEX (PROSTH): 0.74
AV MEAN GRADIENT: 10 MMHG
AV PEAK GRADIENT: 18 MMHG
AV VALVE AREA: 2.34 CM2
AV VELOCITY RATIO: 0.52
BASOPHILS # BLD AUTO: 0.04 K/UL (ref 0–0.2)
BASOPHILS NFR BLD: 0.5 % (ref 0–1.9)
BILIRUB SERPL-MCNC: 0.2 MG/DL (ref 0.1–1)
BSA FOR ECHO PROCEDURE: 2.6 M2
BUN SERPL-MCNC: 7 MG/DL (ref 8–23)
CALCIUM SERPL-MCNC: 9.5 MG/DL (ref 8.7–10.5)
CHLORIDE SERPL-SCNC: 95 MMOL/L (ref 95–110)
CO2 SERPL-SCNC: 36 MMOL/L (ref 23–29)
CREAT SERPL-MCNC: 0.7 MG/DL (ref 0.5–1.4)
CV ECHO LV RWT: 0.37 CM
DIFFERENTIAL METHOD: ABNORMAL
DOP CALC AO PEAK VEL: 2.1 M/S
DOP CALC AO VTI: 37.64 CM
DOP CALC LVOT AREA: 3.2 CM2
DOP CALC LVOT DIAMETER: 2.01 CM
DOP CALC LVOT PEAK VEL: 1.09 M/S
DOP CALC LVOT STROKE VOLUME: 88.26 CM3
DOP CALCLVOT PEAK VEL VTI: 27.83 CM
E WAVE DECELERATION TIME: 272.77 MSEC
E/A RATIO: 1.04
E/E' RATIO: 13.53 M/S
ECHO LV POSTERIOR WALL: 0.78 CM (ref 0.6–1.1)
EOSINOPHIL # BLD AUTO: 0.1 K/UL (ref 0–0.5)
EOSINOPHIL NFR BLD: 1.2 % (ref 0–8)
ERYTHROCYTE [DISTWIDTH] IN BLOOD BY AUTOMATED COUNT: 15.7 % (ref 11.5–14.5)
EST. GFR  (AFRICAN AMERICAN): >60 ML/MIN/1.73 M^2
EST. GFR  (NON AFRICAN AMERICAN): >60 ML/MIN/1.73 M^2
FRACTIONAL SHORTENING: 23 % (ref 28–44)
GLUCOSE SERPL-MCNC: 87 MG/DL (ref 70–110)
HCT VFR BLD AUTO: 34.8 % (ref 37–48.5)
HGB BLD-MCNC: 10 G/DL (ref 12–16)
IMM GRANULOCYTES # BLD AUTO: 0.02 K/UL (ref 0–0.04)
IMM GRANULOCYTES NFR BLD AUTO: 0.2 % (ref 0–0.5)
INTERVENTRICULAR SEPTUM: 1.11 CM (ref 0.6–1.1)
LA MAJOR: 7.42 CM
LA MINOR: 7.01 CM
LA WIDTH: 4.8 CM
LEFT ATRIUM SIZE: 4.37 CM
LEFT ATRIUM VOLUME INDEX: 51.7 ML/M2
LEFT ATRIUM VOLUME: 128.54 CM3
LEFT INTERNAL DIMENSION IN SYSTOLE: 3.29 CM (ref 2.1–4)
LEFT VENTRICLE DIASTOLIC VOLUME INDEX: 32.41 ML/M2
LEFT VENTRICLE DIASTOLIC VOLUME: 80.61 ML
LEFT VENTRICLE MASS INDEX: 52 G/M2
LEFT VENTRICLE SYSTOLIC VOLUME INDEX: 17.7 ML/M2
LEFT VENTRICLE SYSTOLIC VOLUME: 43.9 ML
LEFT VENTRICULAR INTERNAL DIMENSION IN DIASTOLE: 4.25 CM (ref 3.5–6)
LEFT VENTRICULAR MASS: 129.32 G
LV LATERAL E/E' RATIO: 10.45 M/S
LV SEPTAL E/E' RATIO: 19.17 M/S
LYMPHOCYTES # BLD AUTO: 1.8 K/UL (ref 1–4.8)
LYMPHOCYTES NFR BLD: 21.8 % (ref 18–48)
MCH RBC QN AUTO: 27.2 PG (ref 27–31)
MCHC RBC AUTO-ENTMCNC: 28.7 G/DL (ref 32–36)
MCV RBC AUTO: 95 FL (ref 82–98)
MONOCYTES # BLD AUTO: 1.1 K/UL (ref 0.3–1)
MONOCYTES NFR BLD: 13.6 % (ref 4–15)
MV PEAK A VEL: 1.11 M/S
MV PEAK E VEL: 1.15 M/S
MV STENOSIS PRESSURE HALF TIME: 79.1 MS
MV VALVE AREA P 1/2 METHOD: 2.78 CM2
NEUTROPHILS # BLD AUTO: 5.2 K/UL (ref 1.8–7.7)
NEUTROPHILS NFR BLD: 62.7 % (ref 38–73)
NRBC BLD-RTO: 0 /100 WBC
PLATELET # BLD AUTO: 486 K/UL (ref 150–350)
PMV BLD AUTO: 9.4 FL (ref 9.2–12.9)
POTASSIUM SERPL-SCNC: 2.8 MMOL/L (ref 3.5–5.1)
PROT SERPL-MCNC: 6.6 G/DL (ref 6–8.4)
RA MAJOR: 6.13 CM
RA PRESSURE: 3 MMHG
RA WIDTH: 4.28 CM
RBC # BLD AUTO: 3.67 M/UL (ref 4–5.4)
RIGHT VENTRICULAR END-DIASTOLIC DIMENSION: 3.58 CM
SINUS: 3.42 CM
SODIUM SERPL-SCNC: 140 MMOL/L (ref 136–145)
STJ: 3.12 CM
TDI LATERAL: 0.11 M/S
TDI SEPTAL: 0.06 M/S
TDI: 0.09 M/S
TRICUSPID ANNULAR PLANE SYSTOLIC EXCURSION: 3.06 CM
VANCOMYCIN SERPL-MCNC: 24.6 UG/ML
WBC # BLD AUTO: 8.25 K/UL (ref 3.9–12.7)

## 2020-10-12 PROCEDURE — 63600175 PHARM REV CODE 636 W HCPCS: Performed by: PHYSICIAN ASSISTANT

## 2020-10-12 PROCEDURE — 99232 PR SUBSEQUENT HOSPITAL CARE,LEVL II: ICD-10-PCS | Mod: ,,, | Performed by: INTERNAL MEDICINE

## 2020-10-12 PROCEDURE — 80053 COMPREHEN METABOLIC PANEL: CPT

## 2020-10-12 PROCEDURE — 63600175 PHARM REV CODE 636 W HCPCS: Performed by: INTERNAL MEDICINE

## 2020-10-12 PROCEDURE — 99233 SBSQ HOSP IP/OBS HIGH 50: CPT | Mod: ,,, | Performed by: PHYSICIAN ASSISTANT

## 2020-10-12 PROCEDURE — 99233 PR SUBSEQUENT HOSPITAL CARE,LEVL III: ICD-10-PCS | Mod: ,,, | Performed by: PHYSICIAN ASSISTANT

## 2020-10-12 PROCEDURE — 27000221 HC OXYGEN, UP TO 24 HOURS

## 2020-10-12 PROCEDURE — 85025 COMPLETE CBC W/AUTO DIFF WBC: CPT

## 2020-10-12 PROCEDURE — 99223 1ST HOSP IP/OBS HIGH 75: CPT | Mod: ,,, | Performed by: PHYSICIAN ASSISTANT

## 2020-10-12 PROCEDURE — 94761 N-INVAS EAR/PLS OXIMETRY MLT: CPT

## 2020-10-12 PROCEDURE — 99232 SBSQ HOSP IP/OBS MODERATE 35: CPT | Mod: ,,, | Performed by: INTERNAL MEDICINE

## 2020-10-12 PROCEDURE — 99223 PR INITIAL HOSPITAL CARE,LEVL III: ICD-10-PCS | Mod: ,,, | Performed by: PHYSICIAN ASSISTANT

## 2020-10-12 PROCEDURE — 94799 UNLISTED PULMONARY SVC/PX: CPT

## 2020-10-12 PROCEDURE — 80202 ASSAY OF VANCOMYCIN: CPT

## 2020-10-12 PROCEDURE — 36415 COLL VENOUS BLD VENIPUNCTURE: CPT

## 2020-10-12 PROCEDURE — 63600175 PHARM REV CODE 636 W HCPCS: Performed by: HOSPITALIST

## 2020-10-12 PROCEDURE — 25500020 PHARM REV CODE 255: Performed by: INTERNAL MEDICINE

## 2020-10-12 PROCEDURE — A9585 GADOBUTROL INJECTION: HCPCS | Performed by: INTERNAL MEDICINE

## 2020-10-12 PROCEDURE — 11000001 HC ACUTE MED/SURG PRIVATE ROOM

## 2020-10-12 PROCEDURE — 99900035 HC TECH TIME PER 15 MIN (STAT)

## 2020-10-12 PROCEDURE — 25000003 PHARM REV CODE 250: Performed by: HOSPITALIST

## 2020-10-12 RX ORDER — GADOBUTROL 604.72 MG/ML
10 INJECTION INTRAVENOUS
Status: COMPLETED | OUTPATIENT
Start: 2020-10-12 | End: 2020-10-12

## 2020-10-12 RX ORDER — BISACODYL 5 MG
5 TABLET, DELAYED RELEASE (ENTERIC COATED) ORAL DAILY PRN
Status: DISCONTINUED | OUTPATIENT
Start: 2020-10-12 | End: 2020-10-20 | Stop reason: HOSPADM

## 2020-10-12 RX ADMIN — HUMAN ALBUMIN MICROSPHERES AND PERFLUTREN 0.66 MG: 10; .22 INJECTION, SOLUTION INTRAVENOUS at 09:10

## 2020-10-12 RX ADMIN — METOPROLOL SUCCINATE 50 MG: 50 TABLET, EXTENDED RELEASE ORAL at 10:10

## 2020-10-12 RX ADMIN — HYDROCODONE BITARTRATE AND ACETAMINOPHEN 1 TABLET: 10; 325 TABLET ORAL at 05:10

## 2020-10-12 RX ADMIN — HYDROCODONE BITARTRATE AND ACETAMINOPHEN 1 TABLET: 10; 325 TABLET ORAL at 06:10

## 2020-10-12 RX ADMIN — PANTOPRAZOLE SODIUM 40 MG: 40 TABLET, DELAYED RELEASE ORAL at 05:10

## 2020-10-12 RX ADMIN — VANCOMYCIN HYDROCHLORIDE 1750 MG: 750 INJECTION, POWDER, LYOPHILIZED, FOR SOLUTION INTRAVENOUS at 06:10

## 2020-10-12 RX ADMIN — CEFTRIAXONE SODIUM 2 G: 2 INJECTION, SOLUTION INTRAVENOUS at 08:10

## 2020-10-12 RX ADMIN — HYDROCODONE BITARTRATE AND ACETAMINOPHEN 1 TABLET: 10; 325 TABLET ORAL at 11:10

## 2020-10-12 RX ADMIN — DULOXETINE HYDROCHLORIDE 60 MG: 30 CAPSULE, DELAYED RELEASE ORAL at 10:10

## 2020-10-12 RX ADMIN — FUROSEMIDE 40 MG: 40 TABLET ORAL at 10:10

## 2020-10-12 RX ADMIN — ASPIRIN 81 MG: 81 TABLET, COATED ORAL at 11:10

## 2020-10-12 RX ADMIN — PRAVASTATIN SODIUM 40 MG: 40 TABLET ORAL at 08:10

## 2020-10-12 RX ADMIN — GADOBUTROL 10 ML: 604.72 INJECTION INTRAVENOUS at 01:10

## 2020-10-12 RX ADMIN — ISOSORBIDE MONONITRATE 60 MG: 30 TABLET, EXTENDED RELEASE ORAL at 10:10

## 2020-10-12 RX ADMIN — ENOXAPARIN SODIUM 40 MG: 40 INJECTION SUBCUTANEOUS at 06:10

## 2020-10-12 RX ADMIN — DONEPEZIL HYDROCHLORIDE 10 MG: 10 TABLET ORAL at 08:10

## 2020-10-12 RX ADMIN — LISINOPRIL 2.5 MG: 2.5 TABLET ORAL at 10:10

## 2020-10-12 NOTE — PT/OT/SLP PROGRESS
Occupational Therapy      Patient Name:  Martina Betancourt   MRN:  [id number]    Chart review completed:    Patient not seen today secondary to -- neurosurgery consult in place, pending MRI results to determine necessary intervention, pain control and not appropriate to participate with therapy at this time.     Will follow-up per schedule and as patient appropriate to participate.     Neetu Ramirez OT  10/12/2020

## 2020-10-12 NOTE — PROGRESS NOTES
[facility]  Infectious Disease  Progress Note    Patient Name: Martina Betancourt  MRN: [id number]  Admission Date: 10/8/2020  Length of Stay: 4 days  Attending Physician: Tha Villarreal MD  Primary Care Provider: Joe Fuller Iii, MD    Isolation Status: Contact  Assessment/Plan:      Discitis     70 year old female recently admitted to OSH with MRSA bacteremia and subsequent pneumonia treated with Zyvox x 7 days with clearance of her bacteremia now admitted with back pain found to have T9-10 osteo discitis with associated soft tissue abscesses. Spine infection likely hematogenous from under treated bacteremia. Neurosurgery has been consulted. Further imaging ordered. Repeat blood cx sterile. On Vancomycin and Ceftriaxone. Afebrile. HDS/neurologically intact.    Plan  - Continue Vancomycin and Ceftriaxone.  - NSGY consulted for surgical evaluation. Repeat imaging ordered  - If he is not taken for surgery, recommend IR drainage of abscesses if possible  - Plan for 6 - 8 weeks of IV antibiotics  - ID will follow.       MRSA bacteremia      See above        Please call for any questions. Thank you.  Krystina Mendoza PA-C  Phone: [phone]  Pager: [phone]    Subjective:     Principal Problem:Discitis    HPI: Patient is a 70-year-old female with a history of recent hospitalization x2 including a Staph aureus bacteremia and then a subsequent pneumonia who at the last hospitalization about 1-2 weeks ago made a significant improvement with antibiotics return home with her daughter.  The patient reports she was doing well until about 24 hours ago began having excruciating flank pain.  In the ED she had imaging studies that showed a diskitis at the bottom thoracic spine levels and the concern of a possible epidural abscess/vertebral infection was entertained.  The case was discussed with an outside facility that had neurosurgical services and they felt that an MRI was needed before transfer.  She was  transferred from and OSH.  The patient has some dementia and during her last few hospitalizations was placed do not resuscitate because of her deteriorating condition.     Per dc summary from last hospitalization patient was treated from 9/23 to 10/1 with zyvox then dc'd.  Blood cultures cleared on 9/26.  And repeats 10/8 are NGTD.    MRI T spine obtained showing:  There is fluid signal within the T9-T10 disc space along with destructive changes of the adjacent vertebral body endplates and associated marrow edema, suggestive of discitis and osteomyelitis.  Small ovoid areas of fluid signal within the soft tissues along the lateral aspects of the T9-T10 disc space bilaterally could reflect abscesses.  The collection on the left measures 2.7 x 1.5 cm and the collection on the right measures 3.8 x 2.4 cm.  Mild degenerative related signal change is seen along the endplates of multiple other thoracic bodies.    CT renal stone study shows:  Destructive changes at the endplates of the T9 and T10 bodies, new when compared to the chest CT from 08/09/2020 and suspicious for discitis.  Mild-to-moderate right hydronephrosis along with multiple calcifications in the vicinity of the distal right ureter, most of which likely reflect calcified phleboliths.  One of these calcifications could represent a ureteral stone.  Small bilateral pleural effusions with adjacent atelectasis/infiltrates.    Patient afebrile and WBC 13s.  NSGY consulted. On Vanc and Zosyn.  ID consulted for abx recs.  Patient co of sever back pain.  She denies dysuria, fever, chills and sweats.  Interval History: NAEON. Afebrile. No leukocytosis. NSGY evaluated patient. Repeat imaging ordered. She has no acute complaints today.     Review of Systems   Constitutional: Negative for chills, diaphoresis and fever.   HENT: Negative for congestion, rhinorrhea and trouble swallowing.    Respiratory: Negative for cough and shortness of breath.    Cardiovascular:  Negative for chest pain and palpitations.   Gastrointestinal: Positive for constipation. Negative for nausea.   Genitourinary: Positive for flank pain. Negative for difficulty urinating and dysuria.   Musculoskeletal: Positive for back pain. Negative for arthralgias and neck pain.   Skin: Negative for rash and wound.   Neurological: Negative for weakness and numbness.   Psychiatric/Behavioral: Negative for agitation and confusion. The patient is not nervous/anxious.      Objective:     Vital Signs (Most Recent):  Temp: 97.9 °F (36.6 °C) (10/12/20 1458)  Pulse: 63 (10/12/20 1458)  Resp: 18 (10/12/20 1458)  BP: (!) 161/70 (10/12/20 1458)  SpO2: 95 % (10/12/20 1458) Vital Signs (24h Range):  Temp:  [97.9 °F (36.6 °C)-99.1 °F (37.3 °C)] 97.9 °F (36.6 °C)  Pulse:  [63-74] 63  Resp:  [16-23] 18  SpO2:  [88 %-98 %] 95 %  BP: (131-162)/(64-73) 161/70     Weight: (!) 137 kg (302 lb)  Body mass index is 43.33 kg/m².    Estimated Creatinine Clearance: 110 mL/min (based on SCr of 0.7 mg/dL).    Physical Exam  Constitutional:       General: She is not in acute distress.     Appearance: She is well-developed. She is not diaphoretic.       HENT:      Head: Normocephalic and atraumatic.   Cardiovascular:      Rate and Rhythm: Normal rate and regular rhythm.   Pulmonary:      Effort: Pulmonary effort is normal. No respiratory distress.      Breath sounds: Normal breath sounds. No wheezing or rales.   Abdominal:      General: There is no distension.      Palpations: Abdomen is soft.      Tenderness: There is no abdominal tenderness. There is no guarding.   Skin:     General: Skin is warm and dry.   Neurological:      Mental Status: She is alert and oriented to person, place, and time.   Psychiatric:         Behavior: Behavior normal.         Significant Labs: All pertinent labs within the past 24 hours have been reviewed.    Significant Imaging: I have reviewed all pertinent imaging results/findings within the past 24 hours.

## 2020-10-12 NOTE — ASSESSMENT & PLAN NOTE
Martina Betancourt is a 70 y.o. female with recent MRSA bacteremia who presents with new onselt back pain, found to haveT9-T10 discitis with bone erosion and possible adjacent soft tissue abscesses.     Neurologically intact on exam.     - All labs and imaging personally reviewed  - q4 neuro checks  - MRI w wo contrast of thoracic and lumbar spine shows discitis/osteomyelitis at T9-T10 with T8-T10 phlegmon. There is a bilateral spondylolysis at L5 and central canal narrowing from L3-L5.   - CT thoracic/lumbar spine shows T9-T10 discitis/osteomyelitis. No retropulsion, no focal kyphosis.   - No emergent neurosurgical intervention indicated at this time. Recommend IR biopsy/drainage.   - ID: Recent hospitalization for MRSA bacteremia, likely infectious cause. Blood cultures 10/8 NGTD. WBC wnl, afebrile.    - ID recommending vanc + rocephin anticipated for 6-8 weeks.   - Back Pain: Per primary team. Not well managed on current regimen of Norco 10 mg PRN. Dilaudid ordered PRN but pt not taking.   - Recommend trial of muscle relaxer and gabapentin for improved multimodal pain control  - Constipation: Recommend bowel regimen for c/o constipation   - Bilateral pleural effusions: seen on CT lumbar spine. On 4L NC.   - Please notify neurosx for any changes in neuro exam      Discussed with Dr. Monteiro

## 2020-10-12 NOTE — PLAN OF CARE
Interval hx:  10/12 Zosyn stopped, rocephin started. Neurosurgery consult, II spoke to them, consult pending,. WBC -8.2, afebrile, BC- NGTD       10/12/20 1234   Discharge Reassessment   Assessment Type Discharge Planning Reassessment   Provided patient/caregiver education on the expected discharge date and the discharge plan Yes   Do you have any problems affording any of your prescribed medications? TBD   Discharge Plan A Home with family   Discharge Plan B Skilled Nursing Facility   DME Needed Upon Discharge  other (see comments)  (tbd)   Patient choice form signed by patient/caregiver N/A   Anticipated Discharge Disposition Swing Bed   Can the patient/caregiver answer the patient profile reliably? Yes, cognitively intact   How does the patient rate their overall health at the present time? Fair   Describe the patient's ability to walk at the present time. Major restrictions/daily assistance from another person   How often would a person be available to care for the patient? Whenever needed   Number of comorbid conditions (as recorded on the chart) Four   During the past month, has the patient often been bothered by feeling down, depressed or hopeless? No   During the past month, has the patient often been bothered by little interest or pleasure in doing things? No   Post-Acute Status   Post-Acute Authorization Placement   Post-Acute Placement Status Pending Post-Acute Clinical Review   Discharge Delays None known at this time     Marilu Tierney RN  Case Management  Ext: [phone]

## 2020-10-12 NOTE — NURSING
POC reviewed with pt. Pt's questions and concerns were addressed. Medications reviewed in completion with pt. No distress noted. Pt was also given an IS for breathing exercises. VS WDL. Pt call light, bed in lowest and locked position, and bed alarm on. Pt's  personal belongings at the bedside. Will continue to monitor pt accordingly.

## 2020-10-12 NOTE — PT/OT/SLP PROGRESS
Physical Therapy      Patient Name:  Martina Betancourt   MRN:  [id number]    Patient not seen today secondary to PT awaiting Neuro-Sx full consult after viewing MRI. Pt functional mobility limited by pain and Neuro-Sx PA contacted about PLOF and pain limiting function. Will follow-up 10/13/2020 as appropriate.    Gissell Holloway, PT

## 2020-10-12 NOTE — ASSESSMENT & PLAN NOTE
70 year old female recently admitted to OSH with MRSA bacteremia and subsequent pneumonia treated with Zyvox x 7 days with clearance of her bacteremia now admitted with back pain found to have T9-10 osteo discitis with associated soft tissue abscesses. Spine infection likely hematogenous from under treated bacteremia. Neurosurgery has been consulted. Further imaging ordered. Repeat blood cx sterile. On Vancomycin and Ceftriaxone. Afebrile. HDS/neurologically intact.    Plan  - Continue Vancomycin and Ceftriaxone.  - NSGY consulted for surgical evaluation. Repeat imaging ordered  - If he is not taken for surgery, recommend IR drainage of abscesses if possible  - Plan for 6 - 8 weeks of IV antibiotics  - ID will follow.

## 2020-10-12 NOTE — PROGRESS NOTES
Hospital Medicine  Progress note    [place]: [facility] Tha Villarreal MD  Admit Date: 10/8/2020  BRIT 10/12/2020  Length of Stay:  LOS: 4 days   Code status: Full Code    Principal Problem:  Discitis    HPI / Hospital Course     Interval hx:  10/12 Zosyn stopped, rocephin started. Neurosurgery consult, II spoke to them, consult pending,. WBC -8.2, afebrile, BC- NGTD. Will get MRI and CT scan of back today.    10/09 await ID consult. VSS, WBC is 13.2. BC-NGTD    ROS     Respiratory: neg for cough neg for shortness of breath  Cardiovascular: neg for chest pain neg for palpitations  Gastrointestinal: neg for nausea neg for vomiting, neg for abdominal pain neg for diarrhea neg for constipation   Behavioral/Psych: neg for depression neg for anxiety    PEx  Temp:  [97.8 °F (36.6 °C)-99.1 °F (37.3 °C)]   Pulse:  [64-71]   Resp:  [16-23]   BP: (131-176)/(64-87)   SpO2:  [88 %-93 %]     Intake/Output Summary (Last 24 hours) at 10/12/2020 0800  Last data filed at 10/12/2020 0500  Gross per 24 hour   Intake 1000 ml   Output 3350 ml   Net -2350 ml       General Appearance: no acute distress   Heart: regular rate and rhythm  Respiratory: Normal respiratory effort, no crackles   Abdomen: Soft, non-tender; bowel sounds active  Skin: intact.Skin intact  Neurologic:  No focal numbness or weakness  Mental status: Alert, oriented x 4, affect appropriate     Recent Labs   Lab 10/10/20  0625 10/11/20  0542 10/12/20  0522   WBC 9.30 8.81 8.25   HGB 10.2* 9.9* 10.0*   HCT 34.5* 34.5* 34.8*   * 509* 486*     Recent Labs   Lab 10/10/20  0625 10/11/20  0541 10/12/20  0522    140 140   K 3.1* 2.9* 2.8*   CL 96 96 95   CO2 34* 36* 36*   BUN 9 8 7*   CREATININE 0.7 0.7 0.7   GLU 92 87 87   CALCIUM 9.2 9.7 9.5     Recent Labs   Lab 10/10/20  0625 10/11/20  0541 10/12/20  0522   ALKPHOS 90 89 84   ALT 8* 7* 7*   AST 12 12 9*   ALBUMIN 1.8* 1.9* 1.9*   PROT 6.7 6.7 6.6   BILITOT 0.3 0.2 0.2        Recent Labs   Lab 10/11/20  1151    POCTGLUCOSE 119*       Scheduled Meds:   aspirin  81 mg Oral Daily    cefTRIAXone (ROCEPHIN) IVPB  2 g Intravenous Q24H    donepeziL  10 mg Oral QHS    DULoxetine  60 mg Oral Daily    enoxaparin  40 mg Subcutaneous Q24H    furosemide  40 mg Oral Daily    isosorbide mononitrate  60 mg Oral Daily    lisinopriL  2.5 mg Oral Daily    metoprolol succinate  50 mg Oral Daily    pantoprazole  40 mg Oral Before breakfast    pravastatin  40 mg Oral QHS    vancomycin (VANCOCIN) IVPB  1,750 mg Intravenous Q24H     Continuous Infusions:  As Needed:  albuterol-ipratropium, glucose, glucose, HYDROcodone-acetaminophen, HYDROmorphone, melatonin, ondansetron, prochlorperazine, sodium chloride 0.9%, Pharmacy to dose Vancomycin consult **AND** vancomycin - pharmacy to dose    ** update problem list    Active Hospital Problems    Diagnosis  POA    *Discitis [M46.40]  Yes    Hydronephrosis [N13.30]  Yes    HTN (hypertension) [I10]  Unknown    CAD (coronary artery disease) [I25.10]  Unknown    MRSA bacteremia [R78.81, B95.62]  Yes    Dementia without behavioral disturbance [F03.90]  Yes      Resolved Hospital Problems   No resolved problems to display.       Assessment and Plan  / Problems managed today    MRSA Bacteremia  -Pt. With recent hx of MRSA bacteremia presents with back pain. MRI shows  Evidence of discitis/osteomyelitis at T9-T10 with possible small associated abscesses within the adjacent soft tissues  -Neurosurgery consulted regarding need for intervention  -Continue broad spectrum abx with vanc/zosyn therapy for now, but strong suspicion for MRSA as cause.  -Blood cultures ordered as they were not drawn at OSH prior to transfer (note that abx have already been given)  -Consult ID for further recommendations regarding need for zosyn     HTN  -Continue home BP meds     Dementia without behavioral disturbance  -Continue donepezil PRN  -Delirium precautions     CAD  -Pt. With recent NSTEMI during admission  last month. Was deemed Poor cath lab candidate - dementia/DNR  -Continue GDMT with ASA, b-blocker, ACEi, and statin. Will hold plavix for now as pt. Does not have an absolute indication until neurosurgery evaluates need for intervention     DVT PPx: Lovenox           :    Goals of Care:  Return to prior functional status    Discharge plan:    Time (minutes) spent in care of the patient (Greater than 1/2 spent in direct face-to-face contact)  35 minutes    Tha Villarreal MD

## 2020-10-12 NOTE — CONSULTS
[facility]  Neurosurgery  Consult Note    Inpatient consult to Neurosurgery  Consult performed by: Georgina Morrison PA-C  Consult ordered by: Tha Villarreal MD        Subjective:     Chief Complaint/Reason for Admission: Back and flank pain     History of Present Illness: Patient is a 70 year old F with a PMHx of Alzheimer's dz, HTN, HLD, CAD, YOVANI, and morbid obesity who was recently hospitalized x2 for MRSA bacteremia complicated by pneumonia and possible UTI, treated with linozolid x8 days and cipro and discharged 10/1. She was admitted to [facility] 10/8 from ED, presenting for excruciating flank pain x 24 hours. Ct renal stone study negative, but revealed T9-T10 discitis. Non-contrast MRI T spine prior to transfer revealed T9-T10 discitis/osteomyelitis with vertebral bone erosion and possible adjacent soft tissue abscesses. Patient transfer 10/8, began IV vanc 10/8 and IV rocephin 10/9. On neurosx consult exam, patient is awake alert and cooperative, AOx4. 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 Patient reports nonambulatory at baseline, denies changes to her activity other than pain limitation. Neurologically intact on PEx, no evidence of motor/sensory deficit.           Medications Prior to Admission   Medication Sig Dispense Refill Last Dose    acetaminophen (TYLENOL) 325 MG tablet Take 2 tablets (650 mg total) by mouth every 6 (six) hours as needed (HEADACHE, TEMPERATURE - FEVER > 100.4).  0     albuterol-ipratropium (DUO-NEB) 2.5 mg-0.5 mg/3 mL nebulizer solution Take 3 mLs by nebulization every 6 (six) hours as needed for Wheezing or Shortness of Breath. Rescue 1 Box 0     aspirin (ECOTRIN) 81 MG EC tablet Take 81 mg by mouth  once daily.       clopidogreL (PLAVIX) 75 mg tablet Take 1 tablet (75 mg total) by mouth once daily. 30 tablet 11     donepeziL (ARICEPT) 10 MG tablet Take 10 mg by mouth every evening.       DULoxetine (CYMBALTA) 60 MG capsule Take 60 mg by mouth once daily.       ferrous sulfate 324 mg (65 mg iron) TbEC Take 325 mg by mouth every evening.       furosemide (LASIX) 40 MG tablet Take 1 tablet (40 mg total) by mouth once daily. 30 tablet 11     isosorbide mononitrate (IMDUR) 60 MG 24 hr tablet Take 60 mg by mouth once daily.       lisinopriL (PRINIVIL,ZESTRIL) 2.5 MG tablet Take 1 tablet (2.5 mg total) by mouth once daily. 90 tablet 3     memantine (NAMENDA XR) 28 mg CSpX Take 28 mg by mouth once daily.       metoprolol succinate (TOPROL-XL) 50 MG 24 hr tablet Take 50 mg by mouth once daily.       miconazole nitrate 2% (MICOTIN) 2 % Oint Apply topically 2 (two) times daily.  0     pantoprazole (PROTONIX) 40 MG tablet Take 1 tablet (40 mg total) by mouth before breakfast. 30 tablet 11     pravastatin (PRAVACHOL) 40 MG tablet Take 40 mg by mouth every evening.          Review of patient's allergies indicates:   Allergen Reactions    Hydroxyzine hcl     Tizanidine        Past Medical History:   Diagnosis Date    Alzheimer disease     Alzheimer disease     Coronary artery disease     Hyperlipidemia     Hypertension     Myopathy     Non-ST elevation (NSTEMI) myocardial infarction     Obesity     Pneumonia     Sleep apnea      Past Surgical History:   Procedure Laterality Date    APPENDECTOMY      APPENDECTOMY      CHOLECYSTECTOMY      CORONARY STENT PLACEMENT      HYSTERECTOMY      TONSILLECTOMY       Family History     None        Tobacco Use    Smoking status: Unknown If Ever Smoked   Substance and Sexual Activity    Alcohol use: Not Currently    Drug use: Never    Sexual activity: Not Currently     Review of Systems   Constitutional: Negative for chills and fever.   HENT: Negative for  rhinorrhea and trouble swallowing.    Eyes: Negative for photophobia and visual disturbance.   Respiratory: Negative for cough and shortness of breath.    Cardiovascular: Negative for chest pain and palpitations.   Gastrointestinal: Positive for constipation. Negative for nausea and vomiting.   Genitourinary: Positive for flank pain. Negative for difficulty urinating and dysuria.   Musculoskeletal: Positive for back pain. Negative for neck pain.   Skin: Negative for rash and wound.   Neurological: Negative for tremors, weakness and numbness (baseline right foot neuropathy ).   Psychiatric/Behavioral: Negative for agitation and confusion.     Objective:     Weight: (!) 137 kg (302 lb)  Body mass index is 43.33 kg/m².  Vital Signs (Most Recent):  Temp: 98.8 °F (37.1 °C) (10/12/20 1023)  Pulse: 71 (10/12/20 1023)  Resp: 18 (10/12/20 1023)  BP: (!) 162/72 (10/12/20 1023)  SpO2: 95 % (10/12/20 1023) Vital Signs (24h Range):  Temp:  [97.8 °F (36.6 °C)-99.1 °F (37.3 °C)] 98.8 °F (37.1 °C)  Pulse:  [65-71] 71  Resp:  [16-23] 18  SpO2:  [88 %-95 %] 95 %  BP: (131-162)/(64-73) 162/72                     Female External Urinary Catheter 10/07/20 1700 (Active)   Skin no redness;no breakdown 10/11/20 2000   Tolerance no signs/symptoms of discomfort 10/11/20 2000   Suction Continuous suction at 40 mmHg 10/11/20 0808   Date of last wick change 10/09/20 10/09/20 2000   Time of last wick change 0449 10/08/20 2200   Output (mL) 350 mL 10/10/20 0600       Neurosurgery Physical Exam   General: well developed, morbidly obese, no distress.   Head: normocephalic, atraumatic  Neck: No tracheal deviation. Full ROM.   Neurologic: Alert and oriented. Thought content appropriate.  GCS: Motor: 6/Verbal: 5/Eyes: 4 GCS Total: 15  Mental Status: Awake, Alert, Oriented x 4  Language: No aphasia  Speech: No dysarthria  Cranial nerves: face symmetric, tongue midline, CN II-XII grossly intact.   Eyes: pupils equal, round, reactive to light with  accomodation, EOMI.   Ears: No drainage.   Pulmonary: normal respirations, no signs of respiratory distress      Sensory: baseline neuropathy of right plantar foot, otherwise intact to light touch throughout  Motor Strength: Moves all extremities spontaneously with good tone.  Full strength upper and lower extremities. No abnormal movements seen. Range of motion limited 2/2 body habitus.     Strength  Deltoids Triceps Biceps Wrist Extension Wrist Flexion Hand    Upper: R 5/5 5/5 5/5 5/5 5/5 5/5    L 5/5 5/5 5/5 5/5 5/5 5/5     Iliopsoas Quadriceps Knee  Flexion Tibialis  anterior Gastro- cnemius EHL   Lower: R 5/5 5/5 5/5 5/5 5/5 5/5    L 5/5 5/5 5/5 5/5 5/5 5/5     DTR's - symmetric patellar bilaterally     Stein: absent  Clonus: absent  Vascular: No LE edema.   Skin: Skin is warm, dry and intact.    +TTP at midline lower thoracic spine     Significant Labs:  Recent Labs   Lab 10/11/20  0541 10/12/20  0522   GLU 87 87    140   K 2.9* 2.8*   CL 96 95   CO2 36* 36*   BUN 8 7*   CREATININE 0.7 0.7   CALCIUM 9.7 9.5     Recent Labs   Lab 10/11/20  0542 10/12/20  0522   WBC 8.81 8.25   HGB 9.9* 10.0*   HCT 34.5* 34.8*   * 486*     No results for input(s): LABPT, INR, APTT in the last 48 hours.  Microbiology Results (last 7 days)     Procedure Component Value Units Date/Time    Blood culture [[id number]] Collected: 10/08/20 2344    Order Status: Completed Specimen: Blood Updated: 10/12/20 0612     Blood Culture, Routine No Growth to date      No Growth to date      No Growth to date      No Growth to date    Blood culture [[id number]] Collected: 10/08/20 2345    Order Status: Completed Specimen: Blood Updated: 10/12/20 0612     Blood Culture, Routine No Growth to date      No Growth to date      No Growth to date      No Growth to date        All pertinent labs from the last 24 hours have been reviewed.    Significant Diagnostics:  I have reviewed all pertinent imaging results/findings within the past 24  hours.    Assessment/Plan:     * Discitis  Martina Betancourt is a 70 y.o. female with recent MRSA bacteremia who presents with new onselt back pain, found to haveT9-T10 discitis with bone erosion and possible adjacent soft tissue abscesses.     Neurologically intact on exam.     - All labs and imaging personally reviewed  - q4 neuro checks  - Recommend CT thoracic/lumbar and MRI with contrast thoracic/lumbar to assess severity of infection and vertebral stability.   - No emergent neurosurgical intervention indicated at this time. Further recs pending completion of above imaging. May recommend IR bx/aspiration if no plan for surgery.  - ID: Recent hospitalization for MRSA bacteremia, likely infectious cause. Blood cultures 10/8 NGTD. WBC wnl, afebrile.    - Follow up ID recs for abx therapy. Currently on IV vanc (10/8) and cipro (10/9).  - Back Pain: Per primary team. Not well managed on current regimen of Norco 10 mg PRN. Dilaudid ordered PRN but pt not taking.   - Recommend trial of muscle relaxer and gabapentin for improved multimodal pain control  - Constipation: Recommend bowel regimen for c/o constipation   - Please notify neurosx for any changes in neuro exam          Thank you for your consult. I will follow-up with patient. Please contact us if you have any additional questions.    Georgina Morrison PA-C  Neurosurgery  [facility]

## 2020-10-12 NOTE — HPI
Patient is a 70 year old F with a PMHx of Alzheimer's dz, HTN, HLD, CAD, YOVANI, and morbid obesity who was recently hospitalized x2 for MRSA bacteremia complicated by pneumonia and possible UTI, treated with linozolid x8 days and cipro and discharged 10/1. She was admitted to [facility] 10/8 from ED, presenting for excruciating flank pain x 24 hours. Ct renal stone study negative, but revealed T9-T10 discitis. Non-contrast MRI T spine prior to transfer revealed T9-T10 discitis/osteomyelitis with vertebral bone erosion and possible adjacent soft tissue abscesses. Patient transfer 10/8, began IV vanc 10/8 and IV rocephin 10/9. On neurosx consult exam, patient is awake alert and cooperative, AOx4. 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 Patient reports nonambulatory at baseline, denies changes to her activity other than pain limitation. Neurologically intact on PEx, no evidence of motor/sensory deficit.

## 2020-10-12 NOTE — PHYSICIAN QUERY
PT Name: Martina Betancourt  MR #: [id number]    Consultant Diagnosis Clarification     CDS/: BRITTANY Bell, RN, CDS               Contact information:[email or web address]   This form is a permanent document in the medical record.    Query Date: October 12, 2020      By submitting this query, we are merely seeking further clarification of documentation.  Please utilize your independent clinical judgment when addressing the question(s) below.    The Medical Record reflects the following:    Clinical Information Location in Medical Record      Moderate Protein-Calorie Malnutrition    Malnutrition in the context of Chronic Illness/Injury     Related to (etiology):  Decreased intake      Signs and Symptoms (as evidenced by):  Energy Intake: <75% of estimated energy requirement for >1 month   Body Fat Depletion: mild depletion of orbitals   Muscle Mass Depletion: mild depletion of temples   Weight Loss: 12% x 1 month      Recommendation:  1. [place] regular diet, ADAT to regular texture per SLP   2. Add boost plus BID to increase intake   3. Suggest Vitmain C, zinc and MVI to aid in wound healing  4. RD to monitor and follow up     NFPE completed today, pt with mild muscle/fat loss. Meets criteria for moderate malnutrition at this time.     BMI (Calculated): 43.3       RD, 10/9     AND / ASPEN Clinical Characteristics (October 2011)  A minimum of two characteristics is recommended for diagnosing either moderate or severe malnutrition   Mild Malnutrition Moderate Malnutrition Severe Malnutrition   Energy Intake from p.o., TF or TPN. < 75% intake of estimated energy needs for less than 7 days < 75% intake of estimated energy needs for greater than 7 days < 50% intake of estimated energy needs for > 5 days   Weight Loss 1-2% in 1 month  5% in 3 months  7.5% in 6 months  10% in 1 year 1-2 % in 1 week  5% in 1 month  7.5% in 3 months  10% in 6 months  20% in 1 year > 2% in 1 week  > 5% in 1 month  > 7.5% in 3  months  > 10% in 6 months  > 20% in 1 year   Physical Findings     None *Mild subcutaneous fat and/or muscle loss  *Mild fluid accumulation  *Stage II decubitus  *Surgical wound or non-healing wound *Mod/severe subcutaneous fat and/or muscle loss  *Mod/severe fluid accumulation  *Stage III or IV decubitus  *Non-healing surgical wound         Please clarify/confirm the Consultants diagnosis of Moderate Malnutrition:     [x  ] Diagnosis ruled in   [  ] Diagnosis ruled out   [  ] Other diagnosis (please specify): _____________________________   [  ] Clinically undetermined

## 2020-10-12 NOTE — PROGRESS NOTES
Pt moved from stretcher to table / removed gown and VISI monitor per protocol / pt currently on O2 4L N/C with Sat 84% pt mouth breathing and changed to simple mask / Sat increased to 95% @ 4L O2 / MRI scan began

## 2020-10-12 NOTE — PROGRESS NOTES
Pharmacokinetic Assessment Follow Up: IV Vancomycin    Vancomycin serum concentration assessment(s):  · Random level of 24.6 mcg/mL is above target of 15-20 mcg/mL  · Scheduled dose of 1750mg already given this morning  · Renal function stable    Vancomycin Regimen Plan:  1. Will discontinue scheduled vancomycin order and obtain random level with AM labs 10/13. Consider re-dosing when level < 20 mcg/mL    Drug levels (last 3 results):  Recent Labs   Lab Result Units 10/11/20  0542 10/12/20  0522   Vancomycin, Random ug/mL  --  24.6   Vancomycin-Trough ug/mL 26.2*  --        Pharmacy will continue to follow and monitor vancomycin.    Please contact pharmacy at extension [phone] for questions regarding this assessment.    Thank you for the consult,   Shannan Rajan       Patient brief summary:  Martina Betancourt is a 70 y.o. female initiated on antimicrobial therapy with IV Vancomycin for treatment of bone/joint infection    The patient's current regimen is 1750mg q24 hours    Drug Allergies:   Review of patient's allergies indicates:   Allergen Reactions    Hydroxyzine hcl     Tizanidine        Actual Body Weight:   137 kg    Renal Function:   Estimated Creatinine Clearance: 110 mL/min (based on SCr of 0.7 mg/dL).,       CBC (last 72 hours):  Recent Labs   Lab Result Units 10/10/20  0625 10/11/20  0542 10/12/20  0522   WBC K/uL 9.30 8.81 8.25   Hemoglobin g/dL 10.2* 9.9* 10.0*   Hematocrit % 34.5* 34.5* 34.8*   Platelets K/uL 443* 509* 486*   Gran% % 68.5 61.6 62.7   Lymph% % 17.5* 21.8 21.8   Mono% % 12.4 14.1 13.6   Eosinophil% % 1.0 1.4 1.2   Basophil% % 0.3 0.6 0.5   Differential Method  Automated Automated Automated       Metabolic Panel (last 72 hours):  Recent Labs   Lab Result Units 10/10/20  0625 10/11/20  0541 10/12/20  0522   Sodium mmol/L 138 140 140   Potassium mmol/L 3.1* 2.9* 2.8*   Chloride mmol/L 96 96 95   CO2 mmol/L 34* 36* 36*   Glucose mg/dL 92 87 87   BUN, Bld mg/dL 9 8 7*   Creatinine mg/dL 0.7  0.7 0.7   Albumin g/dL 1.8* 1.9* 1.9*   Total Bilirubin mg/dL 0.3 0.2 0.2   Alkaline Phosphatase U/L 90 89 84   AST U/L 12 12 9*   ALT U/L 8* 7* 7*       Vancomycin Administrations:  vancomycin given in the last 96 hours                   vancomycin 1.75 g in 5 % dextrose 500 mL IVPB (mg) 1,750 mg New Bag 10/12/20 0610    vancomycin (VANCOCIN) 2,500 mg in dextrose 5 % 500 mL IVPB (mg) 2,500 mg New Bag 10/11/20 0548     2,500 mg New Bag 10/10/20 0649     2,500 mg New Bag 10/09/20 0539    vancomycin (VANCOCIN) 2,000 mg in dextrose 5 % 500 mL IVPB (mg) 2,000 mg New Bag 10/08/20 1310                Microbiologic Results:  Microbiology Results (last 7 days)     Procedure Component Value Units Date/Time    Blood culture [[id number]] Collected: 10/08/20 2344    Order Status: Completed Specimen: Blood Updated: 10/12/20 0612     Blood Culture, Routine No Growth to date      No Growth to date      No Growth to date      No Growth to date    Blood culture [[id number]] Collected: 10/08/20 2345    Order Status: Completed Specimen: Blood Updated: 10/12/20 0612     Blood Culture, Routine No Growth to date      No Growth to date      No Growth to date      No Growth to date

## 2020-10-12 NOTE — PLAN OF CARE
10/12/20 1138   Post-Acute Status   Post-Acute Authorization Placement   Post-Acute Placement Status Referrals Sent       Pt refusing NH placement only wants Swing bed. Referral made to [facility].  SW in contact with CM and Medical staff. Will continue to follow and offer support as needed.     Salinas Lopez, LMSW [facility]   Ext. [phone]

## 2020-10-12 NOTE — PHYSICIAN QUERY
PT Name: Martina Betancourt  MR #: [id number]     PHYSICIAN QUERY - INTEGUMENTARY CLARIFICATION     CDS/: BRITTANY Bell, RN, CDS               Contact information:[email or web address]   This form is a permanent document in the medical record.     Query Date: October 12, 2020    By submitting this query, we are merely seeking further clarification of documentation.  Please utilize your independent clinical judgment when addressing the question(s) below.    The Medical Record contains the following:   Indicators   Supporting Clinical Findings Location in Medical Record    Redness      Decubitus, Pressure, Ulcer, etc.      Deep Tissue Injury     X Wound care consult  Unstageable pressure injury noted to right heel, 1cm x 1cm dry stable crust/scabbing present. Recommend painting with betadine daily.        Wound care, 10/9    Medication:      Treatment:      Other:          National Pressure Ulcer Advisory Panel (2007) Pressure Ulcer Definitions & Stages:  Stage I:                     Intact skin with non-blanchable redness of a localized area usually over a bony prominence.   Stage II:                    Partial thickness loss of dermis presenting as a shallow open ulcer with a red pink wound bed, without slough.   Stage III:                   Full thickness tissue loss. Subcutaneous fat may be visible but bone, tendon or muscle is not exposed. Slough may be                                      present but does not obscure the depth of tissue loss. May include undermining and tunneling.  Stage IV:                  Full thickness tissue loss with exposed bone, tendon or muscle. Slough or eschar may be present on some parts of the                                      wound bed. Often include undermining and tunneling.  Unstageable:           Full thickness tissue loss but base of ulcer is covered by slough and/or eschar in the wound bed. Until enough                                        slough and/or eschar is  removed to expose wound base, its true depth, and therefore stage, cannot be determined  Deep Tissue Injury: Purple or maroon localized area of discolored intact skin or blood-filled blister due to damage of underlying soft tissue   from pressure and/or shear.  Suspected deep tissue injuries may develop into a stageable ulcer or turn out to be another diagnosis (e.g. an ecchymosis)     Provider, please clarify/ provide the diagnosis related to the documentation Right Heel:    [ x  ] Decubitus (Pressure) Ulcer    [   ] Other Integumentary Diagnosis (please specify):______________   [  ] Diagnosis Clinically Undetermined       Please document in your progress notes daily for the duration of treatment until resolved and include in your discharge summary.

## 2020-10-12 NOTE — NURSING
Hung pt's Vanc; started the infusion at 100mL/hour due to the trough/blood draw being performed at 0522 and the results are not finalized at this point. Will monitor pt.

## 2020-10-12 NOTE — PLAN OF CARE
Problem: Adult Inpatient Plan of Care  Goal: Plan of Care Review  Outcome: Met     Problem: Adult Inpatient Plan of Care  Goal: Patient-Specific Goal (Individualization)  Outcome: Met     Problem: Adult Inpatient Plan of Care  Goal: Absence of Hospital-Acquired Illness or Injury  Outcome: Met     Problem: Adult Inpatient Plan of Care  Goal: Absence of Hospital-Acquired Illness or Injury  Outcome: Met     Problem: Adult Inpatient Plan of Care  Goal: Optimal Comfort and Wellbeing  Outcome: Met     Problem: Adult Inpatient Plan of Care  Goal: Plan of Care Review  Outcome: Met  Goal: Patient-Specific Goal (Individualization)  Outcome: Met  Goal: Absence of Hospital-Acquired Illness or Injury  Outcome: Met  Goal: Optimal Comfort and Wellbeing  Outcome: Met  Goal: Readiness for Transition of Care  Outcome: Met  Goal: Rounds/Family Conference  Outcome: Met     Problem: Bariatric Environmental Safety  Goal: Safety Maintained with Care  Outcome: Met     Problem: Fluid Imbalance (Pneumonia)  Goal: Fluid Balance  Outcome: Met     Problem: Infection (Pneumonia)  Goal: Resolution of Infection Signs/Symptoms  Outcome: Met     Problem: Respiratory Compromise (Pneumonia)  Goal: Effective Oxygenation and Ventilation  Outcome: Met     Problem: Infection  Goal: Infection Symptom Resolution  Outcome: Met     Problem: Wound  Goal: Optimal Wound Healing  Outcome: Met     Problem: Skin Injury Risk Increased  Goal: Skin Health and Integrity  Outcome: Met     Problem: Fall Injury Risk  Goal: Absence of Fall and Fall-Related Injury  Outcome: Met

## 2020-10-12 NOTE — ASSESSMENT & PLAN NOTE
T9-T10 discitis with bone erosion and possible adjacent soft tissue abscesses    - Neurologically intact.    - q4 neuro checks   - Recent hospitalization for MRSA bacteremia, likely infectious cause. Blood cultures 10/8 NGTD.    - WBC wnl, afebrile.    - IV vanc (10/8) and cipro (10/9). Follow up ID recs for abx therapy.   - Recommend CT thoracic/lumbar and MRI with contrast thoracic/lumbar to assess severity of infection and vertebral stability.    - No emergent neurosurgical intervention indicated at this time. Further recs pending completion of above imaging. May recommend IR bx/aspiration if no plan for surgery.   - Recommend bowel regimen for c/o constipation    - Please notify neurosx for any changes in neuro exam

## 2020-10-12 NOTE — SUBJECTIVE & OBJECTIVE
Medications Prior to Admission   Medication Sig Dispense Refill Last Dose    acetaminophen (TYLENOL) 325 MG tablet Take 2 tablets (650 mg total) by mouth every 6 (six) hours as needed (HEADACHE, TEMPERATURE - FEVER > 100.4).  0     albuterol-ipratropium (DUO-NEB) 2.5 mg-0.5 mg/3 mL nebulizer solution Take 3 mLs by nebulization every 6 (six) hours as needed for Wheezing or Shortness of Breath. Rescue 1 Box 0     aspirin (ECOTRIN) 81 MG EC tablet Take 81 mg by mouth once daily.       clopidogreL (PLAVIX) 75 mg tablet Take 1 tablet (75 mg total) by mouth once daily. 30 tablet 11     donepeziL (ARICEPT) 10 MG tablet Take 10 mg by mouth every evening.       DULoxetine (CYMBALTA) 60 MG capsule Take 60 mg by mouth once daily.       ferrous sulfate 324 mg (65 mg iron) TbEC Take 325 mg by mouth every evening.       furosemide (LASIX) 40 MG tablet Take 1 tablet (40 mg total) by mouth once daily. 30 tablet 11     isosorbide mononitrate (IMDUR) 60 MG 24 hr tablet Take 60 mg by mouth once daily.       lisinopriL (PRINIVIL,ZESTRIL) 2.5 MG tablet Take 1 tablet (2.5 mg total) by mouth once daily. 90 tablet 3     memantine (NAMENDA XR) 28 mg CSpX Take 28 mg by mouth once daily.       metoprolol succinate (TOPROL-XL) 50 MG 24 hr tablet Take 50 mg by mouth once daily.       miconazole nitrate 2% (MICOTIN) 2 % Oint Apply topically 2 (two) times daily.  0     pantoprazole (PROTONIX) 40 MG tablet Take 1 tablet (40 mg total) by mouth before breakfast. 30 tablet 11     pravastatin (PRAVACHOL) 40 MG tablet Take 40 mg by mouth every evening.          Review of patient's allergies indicates:   Allergen Reactions    Hydroxyzine hcl     Tizanidine        Past Medical History:   Diagnosis Date    Alzheimer disease     Alzheimer disease     Coronary artery disease     Hyperlipidemia     Hypertension     Myopathy     Non-ST elevation (NSTEMI) myocardial infarction     Obesity     Pneumonia     Sleep apnea      Past  Surgical History:   Procedure Laterality Date    APPENDECTOMY      APPENDECTOMY      CHOLECYSTECTOMY      CORONARY STENT PLACEMENT      HYSTERECTOMY      TONSILLECTOMY       Family History     None        Tobacco Use    Smoking status: Unknown If Ever Smoked   Substance and Sexual Activity    Alcohol use: Not Currently    Drug use: Never    Sexual activity: Not Currently     Review of Systems   Constitutional: Negative for chills and fever.   HENT: Negative for rhinorrhea and trouble swallowing.    Eyes: Negative for photophobia and visual disturbance.   Respiratory: Negative for cough and shortness of breath.    Cardiovascular: Negative for chest pain and palpitations.   Gastrointestinal: Positive for constipation. Negative for nausea and vomiting.   Genitourinary: Positive for flank pain. Negative for difficulty urinating and dysuria.   Musculoskeletal: Positive for back pain. Negative for neck pain.   Skin: Negative for rash and wound.   Neurological: Negative for tremors, weakness and numbness (baseline right foot neuropathy ).   Psychiatric/Behavioral: Negative for agitation and confusion.     Objective:     Weight: (!) 137 kg (302 lb)  Body mass index is 43.33 kg/m².  Vital Signs (Most Recent):  Temp: 98.8 °F (37.1 °C) (10/12/20 1023)  Pulse: 71 (10/12/20 1023)  Resp: 18 (10/12/20 1023)  BP: (!) 162/72 (10/12/20 1023)  SpO2: 95 % (10/12/20 1023) Vital Signs (24h Range):  Temp:  [97.8 °F (36.6 °C)-99.1 °F (37.3 °C)] 98.8 °F (37.1 °C)  Pulse:  [65-71] 71  Resp:  [16-23] 18  SpO2:  [88 %-95 %] 95 %  BP: (131-162)/(64-73) 162/72                     Female External Urinary Catheter 10/07/20 1700 (Active)   Skin no redness;no breakdown 10/11/20 2000   Tolerance no signs/symptoms of discomfort 10/11/20 2000   Suction Continuous suction at 40 mmHg 10/11/20 0808   Date of last wick change 10/09/20 10/09/20 2000   Time of last wick change 0449 10/08/20 2200   Output (mL) 350 mL 10/10/20 0600       Neurosurgery  Physical Exam   General: well developed, morbidly obese, no distress.   Head: normocephalic, atraumatic  Neck: No tracheal deviation. Full ROM.   Neurologic: Alert and oriented. Thought content appropriate.  GCS: Motor: 6/Verbal: 5/Eyes: 4 GCS Total: 15  Mental Status: Awake, Alert, Oriented x 4  Language: No aphasia  Speech: No dysarthria  Cranial nerves: face symmetric, tongue midline, CN II-XII grossly intact.   Eyes: pupils equal, round, reactive to light with accomodation, EOMI.   Ears: No drainage.   Pulmonary: normal respirations, no signs of respiratory distress      Sensory: baseline neuropathy of right plantar foot, otherwise intact to light touch throughout  Motor Strength: Moves all extremities spontaneously with good tone.  Full strength upper and lower extremities. No abnormal movements seen. Range of motion limited 2/2 body habitus.     Strength  Deltoids Triceps Biceps Wrist Extension Wrist Flexion Hand    Upper: R 5/5 5/5 5/5 5/5 5/5 5/5    L 5/5 5/5 5/5 5/5 5/5 5/5     Iliopsoas Quadriceps Knee  Flexion Tibialis  anterior Gastro- cnemius EHL   Lower: R 5/5 5/5 5/5 5/5 5/5 5/5    L 5/5 5/5 5/5 5/5 5/5 5/5     DTR's - symmetric patellar bilaterally     Stein: absent  Clonus: absent  Vascular: No LE edema.   Skin: Skin is warm, dry and intact.    +TTP at midline lower thoracic spine     Significant Labs:  Recent Labs   Lab 10/11/20  0541 10/12/20  0522   GLU 87 87    140   K 2.9* 2.8*   CL 96 95   CO2 36* 36*   BUN 8 7*   CREATININE 0.7 0.7   CALCIUM 9.7 9.5     Recent Labs   Lab 10/11/20  0542 10/12/20  0522   WBC 8.81 8.25   HGB 9.9* 10.0*   HCT 34.5* 34.8*   * 486*     No results for input(s): LABPT, INR, APTT in the last 48 hours.  Microbiology Results (last 7 days)     Procedure Component Value Units Date/Time    Blood culture [[id number]] Collected: 10/08/20 5835    Order Status: Completed Specimen: Blood Updated: 10/12/20 0612     Blood Culture, Routine No Growth to date       No Growth to date      No Growth to date      No Growth to date    Blood culture [[id number]] Collected: 10/08/20 2345    Order Status: Completed Specimen: Blood Updated: 10/12/20 0612     Blood Culture, Routine No Growth to date      No Growth to date      No Growth to date      No Growth to date        All pertinent labs from the last 24 hours have been reviewed.    Significant Diagnostics:  I have reviewed all pertinent imaging results/findings within the past 24 hours.

## 2020-10-12 NOTE — PLAN OF CARE
Problem: Wound  Goal: Optimal Wound Healing  Outcome: Ongoing, Progressing     Problem: Skin Injury Risk Increased  Goal: Skin Health and Integrity  Outcome: Ongoing, Progressing     Problem: Fall Injury Risk  Goal: Absence of Fall and Fall-Related Injury  Outcome: Ongoing, Progressing

## 2020-10-12 NOTE — SUBJECTIVE & OBJECTIVE
Interval History: NAEON. Afebrile. No leukocytosis. NSGY evaluated patient. Repeat imaging ordered. She has no acute complaints today.     Review of Systems   Constitutional: Negative for chills, diaphoresis and fever.   HENT: Negative for congestion, rhinorrhea and trouble swallowing.    Respiratory: Negative for cough and shortness of breath.    Cardiovascular: Negative for chest pain and palpitations.   Gastrointestinal: Positive for constipation. Negative for nausea.   Genitourinary: Positive for flank pain. Negative for difficulty urinating and dysuria.   Musculoskeletal: Positive for back pain. Negative for arthralgias and neck pain.   Skin: Negative for rash and wound.   Neurological: Negative for weakness and numbness.   Psychiatric/Behavioral: Negative for agitation and confusion. The patient is not nervous/anxious.      Objective:     Vital Signs (Most Recent):  Temp: 97.9 °F (36.6 °C) (10/12/20 1458)  Pulse: 63 (10/12/20 1458)  Resp: 18 (10/12/20 1458)  BP: (!) 161/70 (10/12/20 1458)  SpO2: 95 % (10/12/20 1458) Vital Signs (24h Range):  Temp:  [97.9 °F (36.6 °C)-99.1 °F (37.3 °C)] 97.9 °F (36.6 °C)  Pulse:  [63-74] 63  Resp:  [16-23] 18  SpO2:  [88 %-98 %] 95 %  BP: (131-162)/(64-73) 161/70     Weight: (!) 137 kg (302 lb)  Body mass index is 43.33 kg/m².    Estimated Creatinine Clearance: 110 mL/min (based on SCr of 0.7 mg/dL).    Physical Exam  Constitutional:       General: She is not in acute distress.     Appearance: She is well-developed. She is not diaphoretic.       HENT:      Head: Normocephalic and atraumatic.   Cardiovascular:      Rate and Rhythm: Normal rate and regular rhythm.   Pulmonary:      Effort: Pulmonary effort is normal. No respiratory distress.      Breath sounds: Normal breath sounds. No wheezing or rales.   Abdominal:      General: There is no distension.      Palpations: Abdomen is soft.      Tenderness: There is no abdominal tenderness. There is no guarding.   Skin:     General:  Skin is warm and dry.   Neurological:      Mental Status: She is alert and oriented to person, place, and time.   Psychiatric:         Behavior: Behavior normal.         Significant Labs: All pertinent labs within the past 24 hours have been reviewed.    Significant Imaging: I have reviewed all pertinent imaging results/findings within the past 24 hours.

## 2020-10-13 LAB
ALBUMIN SERPL BCP-MCNC: 2 G/DL (ref 3.5–5.2)
ALP SERPL-CCNC: 86 U/L (ref 55–135)
ALT SERPL W/O P-5'-P-CCNC: 7 U/L (ref 10–44)
ANION GAP SERPL CALC-SCNC: 9 MMOL/L (ref 8–16)
AST SERPL-CCNC: 11 U/L (ref 10–40)
BASOPHILS # BLD AUTO: 0.03 K/UL (ref 0–0.2)
BASOPHILS NFR BLD: 0.4 % (ref 0–1.9)
BILIRUB SERPL-MCNC: 0.2 MG/DL (ref 0.1–1)
BUN SERPL-MCNC: 8 MG/DL (ref 8–23)
CALCIUM SERPL-MCNC: 9.6 MG/DL (ref 8.7–10.5)
CHLORIDE SERPL-SCNC: 96 MMOL/L (ref 95–110)
CO2 SERPL-SCNC: 36 MMOL/L (ref 23–29)
CREAT SERPL-MCNC: 0.7 MG/DL (ref 0.5–1.4)
DIFFERENTIAL METHOD: ABNORMAL
EOSINOPHIL # BLD AUTO: 0.1 K/UL (ref 0–0.5)
EOSINOPHIL NFR BLD: 0.8 % (ref 0–8)
ERYTHROCYTE [DISTWIDTH] IN BLOOD BY AUTOMATED COUNT: 15.5 % (ref 11.5–14.5)
EST. GFR  (AFRICAN AMERICAN): >60 ML/MIN/1.73 M^2
EST. GFR  (NON AFRICAN AMERICAN): >60 ML/MIN/1.73 M^2
GLUCOSE SERPL-MCNC: 85 MG/DL (ref 70–110)
HCT VFR BLD AUTO: 35.7 % (ref 37–48.5)
HGB BLD-MCNC: 10.2 G/DL (ref 12–16)
IMM GRANULOCYTES # BLD AUTO: 0.03 K/UL (ref 0–0.04)
IMM GRANULOCYTES NFR BLD AUTO: 0.4 % (ref 0–0.5)
LYMPHOCYTES # BLD AUTO: 1.9 K/UL (ref 1–4.8)
LYMPHOCYTES NFR BLD: 23 % (ref 18–48)
MCH RBC QN AUTO: 27.3 PG (ref 27–31)
MCHC RBC AUTO-ENTMCNC: 28.6 G/DL (ref 32–36)
MCV RBC AUTO: 96 FL (ref 82–98)
MONOCYTES # BLD AUTO: 1 K/UL (ref 0.3–1)
MONOCYTES NFR BLD: 11.7 % (ref 4–15)
NEUTROPHILS # BLD AUTO: 5.3 K/UL (ref 1.8–7.7)
NEUTROPHILS NFR BLD: 63.7 % (ref 38–73)
NRBC BLD-RTO: 0 /100 WBC
PLATELET # BLD AUTO: 441 K/UL (ref 150–350)
PMV BLD AUTO: 9.3 FL (ref 9.2–12.9)
POTASSIUM SERPL-SCNC: 2.8 MMOL/L (ref 3.5–5.1)
PROT SERPL-MCNC: 6.7 G/DL (ref 6–8.4)
RBC # BLD AUTO: 3.73 M/UL (ref 4–5.4)
SODIUM SERPL-SCNC: 141 MMOL/L (ref 136–145)
VANCOMYCIN SERPL-MCNC: 20.7 UG/ML
WBC # BLD AUTO: 8.32 K/UL (ref 3.9–12.7)

## 2020-10-13 PROCEDURE — 63600175 PHARM REV CODE 636 W HCPCS: Performed by: PHYSICIAN ASSISTANT

## 2020-10-13 PROCEDURE — 25000003 PHARM REV CODE 250: Performed by: INTERNAL MEDICINE

## 2020-10-13 PROCEDURE — 99233 SBSQ HOSP IP/OBS HIGH 50: CPT | Mod: ,,, | Performed by: PHYSICIAN ASSISTANT

## 2020-10-13 PROCEDURE — 30200315 PPD INTRADERMAL TEST REV CODE 302: Performed by: INTERNAL MEDICINE

## 2020-10-13 PROCEDURE — 86580 TB INTRADERMAL TEST: CPT | Performed by: INTERNAL MEDICINE

## 2020-10-13 PROCEDURE — 97530 THERAPEUTIC ACTIVITIES: CPT | Mod: CQ

## 2020-10-13 PROCEDURE — 63600175 PHARM REV CODE 636 W HCPCS: Performed by: HOSPITALIST

## 2020-10-13 PROCEDURE — 99232 PR SUBSEQUENT HOSPITAL CARE,LEVL II: ICD-10-PCS | Mod: ,,, | Performed by: INTERNAL MEDICINE

## 2020-10-13 PROCEDURE — 99233 PR SUBSEQUENT HOSPITAL CARE,LEVL III: ICD-10-PCS | Mod: ,,, | Performed by: PHYSICIAN ASSISTANT

## 2020-10-13 PROCEDURE — 85025 COMPLETE CBC W/AUTO DIFF WBC: CPT

## 2020-10-13 PROCEDURE — 80202 ASSAY OF VANCOMYCIN: CPT

## 2020-10-13 PROCEDURE — 97110 THERAPEUTIC EXERCISES: CPT

## 2020-10-13 PROCEDURE — 97535 SELF CARE MNGMENT TRAINING: CPT

## 2020-10-13 PROCEDURE — 99232 SBSQ HOSP IP/OBS MODERATE 35: CPT | Mod: ,,, | Performed by: INTERNAL MEDICINE

## 2020-10-13 PROCEDURE — 11000001 HC ACUTE MED/SURG PRIVATE ROOM

## 2020-10-13 PROCEDURE — 25000003 PHARM REV CODE 250: Performed by: HOSPITALIST

## 2020-10-13 PROCEDURE — 80053 COMPREHEN METABOLIC PANEL: CPT

## 2020-10-13 RX ORDER — BISACODYL 5 MG
5 TABLET, DELAYED RELEASE (ENTERIC COATED) ORAL DAILY
Status: DISCONTINUED | OUTPATIENT
Start: 2020-10-13 | End: 2020-10-20 | Stop reason: HOSPADM

## 2020-10-13 RX ADMIN — ISOSORBIDE MONONITRATE 60 MG: 30 TABLET, EXTENDED RELEASE ORAL at 09:10

## 2020-10-13 RX ADMIN — HYDROCODONE BITARTRATE AND ACETAMINOPHEN 1 TABLET: 10; 325 TABLET ORAL at 12:10

## 2020-10-13 RX ADMIN — PANTOPRAZOLE SODIUM 40 MG: 40 TABLET, DELAYED RELEASE ORAL at 05:10

## 2020-10-13 RX ADMIN — HYDROCODONE BITARTRATE AND ACETAMINOPHEN 1 TABLET: 10; 325 TABLET ORAL at 05:10

## 2020-10-13 RX ADMIN — METOPROLOL SUCCINATE 50 MG: 50 TABLET, EXTENDED RELEASE ORAL at 09:10

## 2020-10-13 RX ADMIN — VANCOMYCIN HYDROCHLORIDE 1750 MG: 10 INJECTION, POWDER, LYOPHILIZED, FOR SOLUTION INTRAVENOUS at 04:10

## 2020-10-13 RX ADMIN — FUROSEMIDE 40 MG: 40 TABLET ORAL at 09:10

## 2020-10-13 RX ADMIN — HYDROCODONE BITARTRATE AND ACETAMINOPHEN 1 TABLET: 10; 325 TABLET ORAL at 03:10

## 2020-10-13 RX ADMIN — LISINOPRIL 2.5 MG: 2.5 TABLET ORAL at 09:10

## 2020-10-13 RX ADMIN — BISACODYL 5 MG: 5 TABLET, COATED ORAL at 03:10

## 2020-10-13 RX ADMIN — MICONAZOLE NITRATE: 20 OINTMENT TOPICAL at 08:10

## 2020-10-13 RX ADMIN — DULOXETINE HYDROCHLORIDE 60 MG: 30 CAPSULE, DELAYED RELEASE ORAL at 09:10

## 2020-10-13 RX ADMIN — DONEPEZIL HYDROCHLORIDE 10 MG: 10 TABLET ORAL at 08:10

## 2020-10-13 RX ADMIN — HYDROCODONE BITARTRATE AND ACETAMINOPHEN 1 TABLET: 10; 325 TABLET ORAL at 10:10

## 2020-10-13 RX ADMIN — ENOXAPARIN SODIUM 40 MG: 40 INJECTION SUBCUTANEOUS at 04:10

## 2020-10-13 RX ADMIN — PRAVASTATIN SODIUM 40 MG: 40 TABLET ORAL at 08:10

## 2020-10-13 RX ADMIN — TUBERCULIN PURIFIED PROTEIN DERIVATIVE 5 UNITS: 5 INJECTION, SOLUTION INTRADERMAL at 04:10

## 2020-10-13 RX ADMIN — CEFTRIAXONE SODIUM 2 G: 2 INJECTION, SOLUTION INTRAVENOUS at 08:10

## 2020-10-13 NOTE — PROGRESS NOTES
Hospital Medicine  Progress note    Team: [facility] MED A Tha Villarreal MD  Admit Date: 10/8/2020  BRIT 10/16/2020  Length of Stay:  LOS: 5 days   Code status: Full Code    Principal Problem:  Discitis    HPI / Hospital Course     Interval hx:  10/13 Zosyn stopped, rocephin started. Neurosurgery consult,II spoke to them, final recs pending,. WBC -8.2, afebrile, BC- NGTD.  MRI and CT scan of back completed, some nerve root compression noted, await NSG input.    10/09 await ID consult. VSS, WBC is 13.2. BC-NGTD    ROS     Respiratory: neg for cough neg for shortness of breath  Cardiovascular: neg for chest pain neg for palpitations  Gastrointestinal: neg for nausea neg for vomiting, neg for abdominal pain neg for diarrhea neg for constipation   Behavioral/Psych: neg for depression neg for anxiety    PEx  Temp:  [97.8 °F (36.6 °C)-98.8 °F (37.1 °C)]   Pulse:  [61-97]   Resp:  [16-18]   BP: (146-176)/(68-76)   SpO2:  [92 %-100 %]     Intake/Output Summary (Last 24 hours) at 10/13/2020 0834  Last data filed at 10/12/2020 1731  Gross per 24 hour   Intake --   Output 1200 ml   Net -1200 ml       General Appearance: no acute distress   Heart: regular rate and rhythm  Respiratory: Normal respiratory effort, no crackles   Abdomen: Soft, non-tender; bowel sounds active  Skin: intact.Skin intact  Neurologic:  No focal numbness or weakness  Mental status: Alert, oriented x 4, affect appropriate     Recent Labs   Lab 10/11/20  0542 10/12/20  0522 10/13/20  0447   WBC 8.81 8.25 8.32   HGB 9.9* 10.0* 10.2*   HCT 34.5* 34.8* 35.7*   * 486* 441*     Recent Labs   Lab 10/11/20  0541 10/12/20  0522 10/13/20  0450    140 141   K 2.9* 2.8* 2.8*   CL 96 95 96   CO2 36* 36* 36*   BUN 8 7* 8   CREATININE 0.7 0.7 0.7   GLU 87 87 85   CALCIUM 9.7 9.5 9.6     Recent Labs   Lab 10/11/20  0541 10/12/20  0522 10/13/20  0450   ALKPHOS 89 84 86   ALT 7* 7* 7*   AST 12 9* 11   ALBUMIN 1.9* 1.9* 2.0*   PROT 6.7 6.6 6.7   BILITOT 0.2 0.2  0.2        Recent Labs   Lab 10/11/20  1153   POCTGLUCOSE 119*       Scheduled Meds:   aspirin  81 mg Oral Daily    cefTRIAXone (ROCEPHIN) IVPB  2 g Intravenous Q24H    donepeziL  10 mg Oral QHS    DULoxetine  60 mg Oral Daily    enoxaparin  40 mg Subcutaneous Q24H    furosemide  40 mg Oral Daily    isosorbide mononitrate  60 mg Oral Daily    lisinopriL  2.5 mg Oral Daily    metoprolol succinate  50 mg Oral Daily    pantoprazole  40 mg Oral Before breakfast    pravastatin  40 mg Oral QHS     Continuous Infusions:  As Needed:  albuterol-ipratropium, bisacodyL, glucose, glucose, HYDROcodone-acetaminophen, HYDROmorphone, melatonin, ondansetron, prochlorperazine, sodium chloride 0.9%, Pharmacy to dose Vancomycin consult **AND** vancomycin - pharmacy to dose    ** update problem list    Active Hospital Problems    Diagnosis  POA    *Discitis [M46.40]  Yes    Bacteremia due to Staphylococcus aureus [R78.81, B95.61]  No    Hydronephrosis [N13.30]  Yes    HTN (hypertension) [I10]  Unknown    CAD (coronary artery disease) [I25.10]  Unknown    MRSA bacteremia [R78.81, B95.62]  Yes    Dementia without behavioral disturbance [F03.90]  Yes      Resolved Hospital Problems   No resolved problems to display.       Assessment and Plan  / Problems managed today    MRSA Bacteremia  -Pt. With recent hx of MRSA bacteremia presents with back pain. MRI shows  Evidence of discitis/osteomyelitis at T9-T10 with possible small associated abscesses within the adjacent soft tissues  -Neurosurgery consulted regarding need for intervention  -Continue broad spectrum abx with vanc/zosyn therapy for now, but strong suspicion for MRSA as cause.  -Blood cultures ordered as they were not drawn at OSH prior to transfer (note that abx have already been given)  -Consult ID for further recommendations regarding need for zosyn     HTN  -Continue home BP meds     Dementia without behavioral disturbance  -Continue donepezil PRN  -Delirium  precautions     CAD  -Pt. With recent NSTEMI during admission last month. Was deemed Poor cath lab candidate - dementia/DNR  -Continue GDMT with ASA, b-blocker, ACEi, and statin. Will hold plavix for now as pt. Does not have an absolute indication until neurosurgery evaluates need for intervention     DVT PPx: Lovenox           :    Goals of Care:  Return to prior functional status    Discharge plan:    Time (minutes) spent in care of the patient (Greater than 1/2 spent in direct face-to-face contact)  35 minutes    Tha Villarreal MD

## 2020-10-13 NOTE — PROGRESS NOTES
Pharmacokinetic Assessment Follow Up: IV Vancomycin    Vancomycin serum concentration assessment(s):  · Random level of 20.7 mcg/mL is now closer to target of < 20 mcg/mL  · Renal function stable    Vancomycin Regimen Plan:  1. Will allow for further clearance today and restart vancomycin 1750mg q24 hours this afternoon  2. Repeat trough prior to 3rd dose of new regimen    Drug levels (last 3 results):  Recent Labs   Lab Result Units 10/11/20  0542 10/12/20  0522 10/13/20  0447   Vancomycin, Random ug/mL  --  24.6 20.7   Vancomycin-Trough ug/mL 26.2*  --   --        Pharmacy will continue to follow and monitor vancomycin.    Please contact pharmacy at extension [phone] for questions regarding this assessment.    Thank you for the consult,   Shannan Rajan       Patient brief summary:  Martina Betancourt is a 70 y.o. female initiated on antimicrobial therapy with IV Vancomycin for treatment of bone/joint infection    Drug Allergies:   Review of patient's allergies indicates:   Allergen Reactions    Hydroxyzine hcl     Tizanidine        Actual Body Weight:   137 kg    Renal Function:   Estimated Creatinine Clearance: 110 mL/min (based on SCr of 0.7 mg/dL).,       CBC (last 72 hours):  Recent Labs   Lab Result Units 10/11/20  0542 10/12/20  0522 10/13/20  0447   WBC K/uL 8.81 8.25 8.32   Hemoglobin g/dL 9.9* 10.0* 10.2*   Hematocrit % 34.5* 34.8* 35.7*   Platelets K/uL 509* 486* 441*   Gran% % 61.6 62.7 63.7   Lymph% % 21.8 21.8 23.0   Mono% % 14.1 13.6 11.7   Eosinophil% % 1.4 1.2 0.8   Basophil% % 0.6 0.5 0.4   Differential Method  Automated Automated Automated       Metabolic Panel (last 72 hours):  Recent Labs   Lab Result Units 10/11/20  0541 10/12/20  0522 10/13/20  0450   Sodium mmol/L 140 140 141   Potassium mmol/L 2.9* 2.8* 2.8*   Chloride mmol/L 96 95 96   CO2 mmol/L 36* 36* 36*   Glucose mg/dL 87 87 85   BUN, Bld mg/dL 8 7* 8   Creatinine mg/dL 0.7 0.7 0.7   Albumin g/dL 1.9* 1.9* 2.0*   Total Bilirubin mg/dL  0.2 0.2 0.2   Alkaline Phosphatase U/L 89 84 86   AST U/L 12 9* 11   ALT U/L 7* 7* 7*       Vancomycin Administrations:  vancomycin given in the last 96 hours                   vancomycin 1.75 g in 5 % dextrose 500 mL IVPB (mg) 1,750 mg New Bag 10/12/20 0610    vancomycin (VANCOCIN) 2,500 mg in dextrose 5 % 500 mL IVPB (mg) 2,500 mg New Bag 10/11/20 0548     2,500 mg New Bag 10/10/20 0649                Microbiologic Results:  Microbiology Results (last 7 days)     Procedure Component Value Units Date/Time    Blood culture [[id number]] Collected: 10/08/20 2345    Order Status: Completed Specimen: Blood Updated: 10/13/20 0612     Blood Culture, Routine No Growth to date      No Growth to date      No Growth to date      No Growth to date      No Growth to date    Blood culture [[id number]] Collected: 10/08/20 2344    Order Status: Completed Specimen: Blood Updated: 10/13/20 0612     Blood Culture, Routine No Growth to date      No Growth to date      No Growth to date      No Growth to date      No Growth to date

## 2020-10-13 NOTE — PLAN OF CARE
Continue per OT POC, all goals remain appropriate. Patient participated well with therapy on this date.    Problem: Occupational Therapy Goal  Goal: Occupational Therapy Goal  Description: Goals to be met by: 10/16/2020     Patient will increase functional independence with ADLs by performing:    Feeding with Minimal Assistance seated EOB.  UE Dressing with Minimal Assistance seated EOB.  Grooming while EOB with Minimal Assistance.  Bathing from edge of bed with wipes with Minimal Assistance.  Rolling to Bilateral with Moderate Assistance.   Supine to sit with Moderate Assistance.    Outcome: Ongoing, Progressing

## 2020-10-13 NOTE — PROGRESS NOTES
[facility]  Infectious Disease  Progress Note    Patient Name: Martina Betancourt  MRN: [id number]  Admission Date: 10/8/2020  Length of Stay: 5 days  Attending Physician: Tha Villarreal MD  Primary Care Provider: Joe Fuller Iii, MD    Isolation Status: Contact  Assessment/Plan:     MRSA bacteremia   See below    Discitis     70 year old female recently admitted to OSH with MRSA bacteremia and subsequent pneumonia treated with Zyvox x 7 days with clearance of her bacteremia now admitted with back pain found to have T9-10 osteo discitis, T8-10 epidural phlegmon with associated paraverterbral abscesses. Also noted to have bilateral complex pleural effusions. Spine infection likely hematogenous from under treated bacteremia. Neurosurgery has been consulted - no plans for acute surgical intervention. On Vancomycin and Ceftriaxone. Afebrile. HDS. Repeat blood cx sterile.    Plan  - Continue Vancomycin and Ceftriaxone.  - If neurosurgery does not plan for surgical intervention,  recommend IR drainage of abscesses if possible to send for gram stain, aerobic, anaerobic, AFB and fungal cultures   - Monitor respiratory status closely. Consider pulm consult for diagnostic/therapeutic thoracentesis.   - Plan for 6 - 8 weeks of IV antibiotics  - ID will follow.             Please call for any questions. Thank you.  Krystina Mendoza PA-C  Phone: [phone]  Pager: [phone]      Subjective:     Principal Problem:Discitis    HPI: Patient is a 70-year-old female with a history of recent hospitalization x2 including a Staph aureus bacteremia and then a subsequent pneumonia who at the last hospitalization about 1-2 weeks ago made a significant improvement with antibiotics return home with her daughter.  The patient reports she was doing well until about 24 hours ago began having excruciating flank pain.  In the ED she had imaging studies that showed a diskitis at the bottom thoracic spine levels and the concern of a  possible epidural abscess/vertebral infection was entertained.  The case was discussed with an outside facility that had neurosurgical services and they felt that an MRI was needed before transfer.  She was transferred from and [facility].  The patient has some dementia and during her last few hospitalizations was placed do not resuscitate because of her deteriorating condition.     Per dc summary from last hospitalization patient was treated from 9/23 to 10/1 with zyvox then dc'd.  Blood cultures cleared on 9/26.  And repeats 10/8 are NGTD.    MRI T spine obtained showing:  There is fluid signal within the T9-T10 disc space along with destructive changes of the adjacent vertebral body endplates and associated marrow edema, suggestive of discitis and osteomyelitis.  Small ovoid areas of fluid signal within the soft tissues along the lateral aspects of the T9-T10 disc space bilaterally could reflect abscesses.  The collection on the left measures 2.7 x 1.5 cm and the collection on the right measures 3.8 x 2.4 cm.  Mild degenerative related signal change is seen along the endplates of multiple other thoracic bodies.    CT renal stone study shows:  Destructive changes at the endplates of the T9 and T10 bodies, new when compared to the chest CT from 08/09/2020 and suspicious for discitis.  Mild-to-moderate right hydronephrosis along with multiple calcifications in the vicinity of the distal right ureter, most of which likely reflect calcified phleboliths.  One of these calcifications could represent a ureteral stone.  Small bilateral pleural effusions with adjacent atelectasis/infiltrates.    Patient afebrile and WBC 13s.  NSGY consulted. On Vanc and Zosyn.  ID consulted for abx recs.  Patient co of sever back pain.  She denies dysuria, fever, chills and sweats.  Interval History: NAEON. Afebrile. No leukocytosis. Repeat imaging reviewed. NSGY does not plan for acute surgical intervention. IR has been consulted. She complains  of ongoing constipation. No BM in days. Has not received a stool softener or laxative. Still SOB.    Review of Systems   Constitutional: Negative for chills, diaphoresis and fever.   Respiratory: Positive for shortness of breath. Negative for cough.    Cardiovascular: Negative for chest pain and palpitations.   Gastrointestinal: Positive for constipation. Negative for nausea.   Genitourinary: Positive for flank pain. Negative for difficulty urinating and dysuria.   Musculoskeletal: Positive for back pain. Negative for arthralgias and neck pain.   Skin: Negative for rash and wound.   Neurological: Negative for weakness and numbness.   Psychiatric/Behavioral: Negative for agitation and confusion. The patient is not nervous/anxious.      Objective:     Vital Signs (Most Recent):  Temp: 97.5 °F (36.4 °C) (10/13/20 1142)  Pulse: 62 (10/13/20 1142)  Resp: 18 (10/13/20 1142)  BP: (!) 175/76 (10/13/20 1142)  SpO2: (!) 94 % (10/13/20 1142) Vital Signs (24h Range):  Temp:  [97.5 °F (36.4 °C)-98 °F (36.7 °C)] 97.5 °F (36.4 °C)  Pulse:  [61-97] 62  Resp:  [16-18] 18  SpO2:  [92 %-100 %] 94 %  BP: (160-176)/(70-76) 175/76     Weight: (!) 137 kg (302 lb)  Body mass index is 43.33 kg/m².    Estimated Creatinine Clearance: 110 mL/min (based on SCr of 0.7 mg/dL).    Physical Exam  Constitutional:       General: She is not in acute distress.     Appearance: She is well-developed. She is not diaphoretic.   HENT:      Head: Normocephalic and atraumatic.   Eyes:      General: No scleral icterus.     Conjunctiva/sclera: Conjunctivae normal.   Cardiovascular:      Rate and Rhythm: Normal rate and regular rhythm.   Pulmonary:      Effort: Pulmonary effort is normal. No respiratory distress.      Comments: O2 via NC  Abdominal:      General: There is no distension.      Palpations: Abdomen is soft.      Tenderness: There is no abdominal tenderness. There is no guarding.   Musculoskeletal:         General: Tenderness (midline spine, flanks)  present.   Skin:     General: Skin is warm and dry.      Findings: Rash (intertrigo) present.   Neurological:      Mental Status: She is alert and oriented to person, place, and time.   Psychiatric:         Mood and Affect: Mood normal.         Behavior: Behavior normal.         Thought Content: Thought content normal.         Significant Labs: All pertinent labs within the past 24 hours have been reviewed.    Significant Imaging: I have reviewed all pertinent imaging results/findings within the past 24 hours.

## 2020-10-13 NOTE — PROGRESS NOTES
[facility]  Neurosurgery  Progress Note    Subjective:     History of Present Illness: Patient is a 70 year old F with a PMHx of Alzheimer's dz, HTN, HLD, CAD, YOVANI, and morbid obesity who was recently hospitalized x2 for MRSA bacteremia complicated by pneumonia and possible UTI, treated with linozolid x8 days and cipro and discharged 10/1. She was admitted to [facility] 10/8 from ED, presenting for excruciating flank pain x 24 hours. Ct renal stone study negative, but revealed T9-T10 discitis. Non-contrast MRI T spine prior to transfer revealed T9-T10 discitis/osteomyelitis with vertebral bone erosion and possible adjacent soft tissue abscesses. Patient transfer 10/8, began IV vanc 10/8 and IV rocephin 10/9. On neurosx consult exam, patient is awake alert and cooperative, AOx4. 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 Patient reports nonambulatory at baseline, denies changes to her activity other than pain limitation. Neurologically intact on PEx, no evidence of motor/sensory deficit.           Post-Op Info:  * No surgery found *         Interval History: NAEON. Patient voiding spontaneously. Reports continued constipation. Neurologically intact on exam. Denies paresthesias or weakness, only endorses Mid back pain. MRI T/L spine and CT T/L spine complete. Continues on vanc + rocephin.     Medications:  Continuous Infusions:  Scheduled Meds:   aspirin  81 mg Oral Daily    cefTRIAXone (ROCEPHIN) IVPB  2 g Intravenous Q24H    donepeziL  10 mg Oral QHS    DULoxetine  60 mg Oral Daily    enoxaparin  40 mg Subcutaneous Q24H    furosemide  40 mg Oral Daily    isosorbide mononitrate  60 mg Oral Daily    lisinopriL  2.5 mg Oral Daily    metoprolol  succinate  50 mg Oral Daily    pantoprazole  40 mg Oral Before breakfast    pravastatin  40 mg Oral QHS     PRN Meds:albuterol-ipratropium, bisacodyL, glucose, glucose, HYDROcodone-acetaminophen, HYDROmorphone, melatonin, ondansetron, prochlorperazine, sodium chloride 0.9%, Pharmacy to dose Vancomycin consult **AND** vancomycin - pharmacy to dose     Review of Systems  Objective:     Weight: (!) 137 kg (302 lb)  Body mass index is 43.33 kg/m².  Vital Signs (Most Recent):  Temp: 98 °F (36.7 °C) (10/13/20 0442)  Pulse: 68 (10/13/20 0442)  Resp: 18 (10/13/20 0538)  BP: (!) 161/72 (10/13/20 0442)  SpO2: (!) 93 % (10/13/20 0442) Vital Signs (24h Range):  Temp:  [97.8 °F (36.6 °C)-98 °F (36.7 °C)] 98 °F (36.7 °C)  Pulse:  [61-97] 68  Resp:  [16-18] 18  SpO2:  [92 %-100 %] 93 %  BP: (146-176)/(68-76) 161/72                     Female External Urinary Catheter 10/07/20 1700 (Active)   Skin no redness;no breakdown 10/12/20 2019   Tolerance no signs/symptoms of discomfort 10/12/20 2019   Suction Continuous suction at 40 mmHg 10/11/20 0808   Date of last wick change 10/09/20 10/09/20 2000   Time of last wick change 0449 10/08/20 2200   Output (mL) 350 mL 10/10/20 0600       Neurosurgery Physical Exam  General: well developed, morbidly obese, no distress.   Head: normocephalic, atraumatic  Neck: No tracheal deviation. Full ROM.   Neurologic: Alert and oriented. Thought content appropriate.  GCS: Motor: 6/Verbal: 5/Eyes: 4 GCS Total: 15  Mental Status: Awake, Alert, Oriented x 4  Language: No aphasia  Speech: No dysarthria  Cranial nerves: face symmetric, tongue midline, CN II-XII grossly intact.   Eyes: pupils equal, round, reactive to light with accomodation, EOMI.   Ears: No drainage.   Pulmonary: normal respirations, no signs of respiratory distress        Sensory: baseline neuropathy of right plantar foot, otherwise intact to light touch throughout  Motor Strength: Moves all extremities spontaneously with good tone.  Full  strength upper and lower extremities. No abnormal movements seen. Range of motion limited 2/2 body habitus.      Strength   Deltoids Triceps Biceps Wrist Extension Wrist Flexion Hand    Upper: R 5/5 5/5 5/5 5/5 5/5 5/5     L 5/5 5/5 5/5 5/5 5/5 5/5       Iliopsoas Quadriceps Knee  Flexion Tibialis  anterior Gastro- cnemius EHL   Lower: R 5/5 5/5 5/5 5/5 5/5 5/5     L 5/5 5/5 5/5 5/5 5/5 5/5     Stein: absent  Clonus: absent  Skin: Skin is warm, dry and intact.       Significant Labs:  Recent Labs   Lab 10/12/20  0522 10/13/20  0450   GLU 87 85    141   K 2.8* 2.8*   CL 95 96   CO2 36* 36*   BUN 7* 8   CREATININE 0.7 0.7   CALCIUM 9.5 9.6     Recent Labs   Lab 10/12/20  0522 10/13/20  0447   WBC 8.25 8.32   HGB 10.0* 10.2*   HCT 34.8* 35.7*   * 441*     No results for input(s): LABPT, INR, APTT in the last 48 hours.  Microbiology Results (last 7 days)     Procedure Component Value Units Date/Time    Blood culture [[id number]] Collected: 10/08/20 2345    Order Status: Completed Specimen: Blood Updated: 10/13/20 0612     Blood Culture, Routine No Growth to date      No Growth to date      No Growth to date      No Growth to date      No Growth to date    Blood culture [[id number]] Collected: 10/08/20 2344    Order Status: Completed Specimen: Blood Updated: 10/13/20 0612     Blood Culture, Routine No Growth to date      No Growth to date      No Growth to date      No Growth to date      No Growth to date        All pertinent labs from the last 24 hours have been reviewed.    Significant Diagnostics:  Ct Thoracic Spine Without Contrast    Result Date: 10/13/2020  Findings above consistent with T9-T10 discitis/osteomyelitis with multilocular paravertebral abscesses, better evaluated on MRI 10/12/2020.  No significant retropulsion osseous fragments into the canal. Multilevel lumbar spondylosis resulting in moderate to severe spinal canal stenosis from L2-L3 through L4-L5 and severe bilateral neural  foraminal narrowing at L5-S1. Mild multilevel thoracic spondylosis, detailed above. Bilateral pleural effusions, larger on the left with compressive atelectasis/volume loss of adjacent lung parenchyma. Moderate-large size hiatal hernia Multi-vessel coronary artery atherosclerosis. Additional findings detailed above. This report was flagged in Epic as abnormal. Electronically signed by resident: Oscar Castro MD Date:    10/13/2020 Time:    07:49 Electronically signed by: Israel Diaz MD Date:    10/13/2020 Time:    08:54    Ct Lumbar Spine Without Contrast    Result Date: 10/13/2020  Findings above consistent with T9-T10 discitis/osteomyelitis with multilocular paravertebral abscesses, better evaluated on MRI 10/12/2020.  No significant retropulsion osseous fragments into the canal. Multilevel lumbar spondylosis resulting in moderate to severe spinal canal stenosis from L2-L3 through L4-L5 and severe bilateral neural foraminal narrowing at L5-S1. Mild multilevel thoracic spondylosis, detailed above. Bilateral pleural effusions, larger on the left with compressive atelectasis/volume loss of adjacent lung parenchyma. Moderate-large size hiatal hernia Multi-vessel coronary artery atherosclerosis. Additional findings detailed above. This report was flagged in Epic as abnormal. Electronically signed by resident: Oscar Castro MD Date:    10/13/2020 Time:    07:49 Electronically signed by: Israel Diaz MD Date:    10/13/2020 Time:    08:54    Mri Thoracic Spine W Wo Cont    Result Date: 10/12/2020  Discitis/osteomyelitis at the T9-T10 level with multilocular paravertebral abscesses similar to prior exam. T8-T10 epidural phlegmon results in moderate narrowing of the thecal sac. There is associated nerve root enhancement, which may be seen with arachnoiditis. Bilateral complex pleural effusions with enhancement.  Empyema is a consideration.  Consider further evaluation with contrast enhanced chest CT. Multilevel mild  degenerative changes and disc bulges in the thoracic spine, most prominent at T5-T6. Additional findings as above. This report was flagged in Epic as abnormal. Electronically signed by resident: Efrem Urena Date:    10/12/2020 Time:    14:05 Electronically signed by: Tiago Luna MD Date:    10/12/2020 Time:    16:20    Mri Lumbar Spine W Wo Cont    Result Date: 10/12/2020  No evidence for spondylodiscitis. Bilateral L5 spondylolysis with grade 2 anterolisthesis. Multilevel degenerative changes of the lumbar spine detailed above.  Moderate to severe spinal canal stenosis noted at L2-L5.  Severe neural foraminal narrowing noted at L5-S1. Electronically signed by: Tiago Luna MD Date:    10/12/2020 Time:    13:30      Assessment/Plan:     * Discitis  Martina Betancourt is a 70 y.o. female with recent MRSA bacteremia who presents with new onselt back pain, found to haveT9-T10 discitis with bone erosion and possible adjacent soft tissue abscesses.     Neurologically intact on exam.     - All labs and imaging personally reviewed  - q4 neuro checks  - MRI w wo contrast of thoracic and lumbar spine shows discitis/osteomyelitis at T9-T10 with T8-T10 phlegmon. There is a bilateral spondylolysis at L5 and central canal narrowing from L3-L5.   - CT thoracic/lumbar spine shows T9-T10 discitis/osteomyelitis. No retropulsion, no focal kyphosis.   - No emergent neurosurgical intervention indicated at this time. Recommend IR biopsy/drainage.   - ID: Recent hospitalization for MRSA bacteremia, likely infectious cause. Blood cultures 10/8 NGTD. WBC wnl, afebrile.    - ID recommending vanc + rocephin anticipated for 6-8 weeks.   - Back Pain: Per primary team. Not well managed on current regimen of Norco 10 mg PRN. Dilaudid ordered PRN but pt not taking.   - Recommend trial of muscle relaxer and gabapentin for improved multimodal pain control  - Constipation: Recommend bowel regimen for c/o constipation   - Bilateral pleural  effusions: seen on CT lumbar spine. On 4L NC.   - Please notify neurosx for any changes in neuro exam      Discussed with Dr. Thania Manzo PA-C  Neurosurgery  [facility]

## 2020-10-13 NOTE — PLAN OF CARE
Problem: Adult Inpatient Plan of Care  Goal: Plan of Care Review  Outcome: Ongoing, Progressing  Goal: Patient-Specific Goal (Individualization)  Outcome: Ongoing, Progressing  Goal: Absence of Hospital-Acquired Illness or Injury  Outcome: Ongoing, Progressing  Goal: Optimal Comfort and Wellbeing  Outcome: Ongoing, Progressing  Goal: Readiness for Transition of Care  Outcome: Ongoing, Progressing  Goal: Rounds/Family Conference  Outcome: Ongoing, Progressing   Patient remains free from injury or fall. No neuro changes noted or reported. PPD placed in RFA. Plan= to OR on Thursday. Will be NPO after midnight on Wednesday. Will hold lovenox 24 hrs before procedure. Will continue IV antibiotics and pain management. Isolation maintained.

## 2020-10-13 NOTE — SUBJECTIVE & OBJECTIVE
Interval History: NAEON. Patient voiding spontaneously. Reports continued constipation. Neurologically intact on exam. Denies paresthesias or weakness, only endorses Mid back pain. MRI T/L spine and CT T/L spine complete. Continues on vanc + rocephin.     Medications:  Continuous Infusions:  Scheduled Meds:   aspirin  81 mg Oral Daily    cefTRIAXone (ROCEPHIN) IVPB  2 g Intravenous Q24H    donepeziL  10 mg Oral QHS    DULoxetine  60 mg Oral Daily    enoxaparin  40 mg Subcutaneous Q24H    furosemide  40 mg Oral Daily    isosorbide mononitrate  60 mg Oral Daily    lisinopriL  2.5 mg Oral Daily    metoprolol succinate  50 mg Oral Daily    pantoprazole  40 mg Oral Before breakfast    pravastatin  40 mg Oral QHS     PRN Meds:albuterol-ipratropium, bisacodyL, glucose, glucose, HYDROcodone-acetaminophen, HYDROmorphone, melatonin, ondansetron, prochlorperazine, sodium chloride 0.9%, Pharmacy to dose Vancomycin consult **AND** vancomycin - pharmacy to dose     Review of Systems  Objective:     Weight: (!) 137 kg (302 lb)  Body mass index is 43.33 kg/m².  Vital Signs (Most Recent):  Temp: 98 °F (36.7 °C) (10/13/20 0442)  Pulse: 68 (10/13/20 0442)  Resp: 18 (10/13/20 0538)  BP: (!) 161/72 (10/13/20 0442)  SpO2: (!) 93 % (10/13/20 0442) Vital Signs (24h Range):  Temp:  [97.8 °F (36.6 °C)-98 °F (36.7 °C)] 98 °F (36.7 °C)  Pulse:  [61-97] 68  Resp:  [16-18] 18  SpO2:  [92 %-100 %] 93 %  BP: (146-176)/(68-76) 161/72                     Female External Urinary Catheter 10/07/20 1700 (Active)   Skin no redness;no breakdown 10/12/20 2019   Tolerance no signs/symptoms of discomfort 10/12/20 2019   Suction Continuous suction at 40 mmHg 10/11/20 0808   Date of last wick change 10/09/20 10/09/20 2000   Time of last wick change 0449 10/08/20 2200   Output (mL) 350 mL 10/10/20 0600       Neurosurgery Physical Exam  General: well developed, morbidly obese, no distress.   Head: normocephalic, atraumatic  Neck: No tracheal  deviation. Full ROM.   Neurologic: Alert and oriented. Thought content appropriate.  GCS: Motor: 6/Verbal: 5/Eyes: 4 GCS Total: 15  Mental Status: Awake, Alert, Oriented x 4  Language: No aphasia  Speech: No dysarthria  Cranial nerves: face symmetric, tongue midline, CN II-XII grossly intact.   Eyes: pupils equal, round, reactive to light with accomodation, EOMI.   Ears: No drainage.   Pulmonary: normal respirations, no signs of respiratory distress        Sensory: baseline neuropathy of right plantar foot, otherwise intact to light touch throughout  Motor Strength: Moves all extremities spontaneously with good tone.  Full strength upper and lower extremities. No abnormal movements seen. Range of motion limited 2/2 body habitus.      Strength   Deltoids Triceps Biceps Wrist Extension Wrist Flexion Hand    Upper: R 5/5 5/5 5/5 5/5 5/5 5/5     L 5/5 5/5 5/5 5/5 5/5 5/5       Iliopsoas Quadriceps Knee  Flexion Tibialis  anterior Gastro- cnemius EHL   Lower: R 5/5 5/5 5/5 5/5 5/5 5/5     L 5/5 5/5 5/5 5/5 5/5 5/5     Stein: absent  Clonus: absent  Skin: Skin is warm, dry and intact.       Significant Labs:  Recent Labs   Lab 10/12/20  0522 10/13/20  0450   GLU 87 85    141   K 2.8* 2.8*   CL 95 96   CO2 36* 36*   BUN 7* 8   CREATININE 0.7 0.7   CALCIUM 9.5 9.6     Recent Labs   Lab 10/12/20  0522 10/13/20  0447   WBC 8.25 8.32   HGB 10.0* 10.2*   HCT 34.8* 35.7*   * 441*     No results for input(s): LABPT, INR, APTT in the last 48 hours.  Microbiology Results (last 7 days)     Procedure Component Value Units Date/Time    Blood culture [[id number]] Collected: 10/08/20 2345    Order Status: Completed Specimen: Blood Updated: 10/13/20 0612     Blood Culture, Routine No Growth to date      No Growth to date      No Growth to date      No Growth to date      No Growth to date    Blood culture [[id number]] Collected: 10/08/20 2344    Order Status: Completed Specimen: Blood Updated: 10/13/20 0612     Blood  Culture, Routine No Growth to date      No Growth to date      No Growth to date      No Growth to date      No Growth to date        All pertinent labs from the last 24 hours have been reviewed.    Significant Diagnostics:  Ct Thoracic Spine Without Contrast    Result Date: 10/13/2020  Findings above consistent with T9-T10 discitis/osteomyelitis with multilocular paravertebral abscesses, better evaluated on MRI 10/12/2020.  No significant retropulsion osseous fragments into the canal. Multilevel lumbar spondylosis resulting in moderate to severe spinal canal stenosis from L2-L3 through L4-L5 and severe bilateral neural foraminal narrowing at L5-S1. Mild multilevel thoracic spondylosis, detailed above. Bilateral pleural effusions, larger on the left with compressive atelectasis/volume loss of adjacent lung parenchyma. Moderate-large size hiatal hernia Multi-vessel coronary artery atherosclerosis. Additional findings detailed above. This report was flagged in Epic as abnormal. Electronically signed by resident: Oscar Castro MD Date:    10/13/2020 Time:    07:49 Electronically signed by: Israel Diaz MD Date:    10/13/2020 Time:    08:54    Ct Lumbar Spine Without Contrast    Result Date: 10/13/2020  Findings above consistent with T9-T10 discitis/osteomyelitis with multilocular paravertebral abscesses, better evaluated on MRI 10/12/2020.  No significant retropulsion osseous fragments into the canal. Multilevel lumbar spondylosis resulting in moderate to severe spinal canal stenosis from L2-L3 through L4-L5 and severe bilateral neural foraminal narrowing at L5-S1. Mild multilevel thoracic spondylosis, detailed above. Bilateral pleural effusions, larger on the left with compressive atelectasis/volume loss of adjacent lung parenchyma. Moderate-large size hiatal hernia Multi-vessel coronary artery atherosclerosis. Additional findings detailed above. This report was flagged in Epic as abnormal. Electronically signed by  resident: Oscar Castro MD Date:    10/13/2020 Time:    07:49 Electronically signed by: Israel Diaz MD Date:    10/13/2020 Time:    08:54    Mri Thoracic Spine W Wo Cont    Result Date: 10/12/2020  Discitis/osteomyelitis at the T9-T10 level with multilocular paravertebral abscesses similar to prior exam. T8-T10 epidural phlegmon results in moderate narrowing of the thecal sac. There is associated nerve root enhancement, which may be seen with arachnoiditis. Bilateral complex pleural effusions with enhancement.  Empyema is a consideration.  Consider further evaluation with contrast enhanced chest CT. Multilevel mild degenerative changes and disc bulges in the thoracic spine, most prominent at T5-T6. Additional findings as above. This report was flagged in Epic as abnormal. Electronically signed by resident: Efrem Urena Date:    10/12/2020 Time:    14:05 Electronically signed by: Tiago Luna MD Date:    10/12/2020 Time:    16:20    Mri Lumbar Spine W Wo Cont    Result Date: 10/12/2020  No evidence for spondylodiscitis. Bilateral L5 spondylolysis with grade 2 anterolisthesis. Multilevel degenerative changes of the lumbar spine detailed above.  Moderate to severe spinal canal stenosis noted at L2-L5.  Severe neural foraminal narrowing noted at L5-S1. Electronically signed by: Tiago Luna MD Date:    10/12/2020 Time:    13:30

## 2020-10-13 NOTE — SUBJECTIVE & OBJECTIVE
Interval History: NAEON. Afebrile. No leukocytosis. Repeat imaging reviewed. NSGY does not plan for acute surgical intervention. IR has been consulted. She complains of ongoing constipation. No BM in days. Has not received a stool softener or laxative. Still SOB.    Review of Systems   Constitutional: Negative for chills, diaphoresis and fever.   Respiratory: Positive for shortness of breath. Negative for cough.    Cardiovascular: Negative for chest pain and palpitations.   Gastrointestinal: Positive for constipation. Negative for nausea.   Genitourinary: Positive for flank pain. Negative for difficulty urinating and dysuria.   Musculoskeletal: Positive for back pain. Negative for arthralgias and neck pain.   Skin: Negative for rash and wound.   Neurological: Negative for weakness and numbness.   Psychiatric/Behavioral: Negative for agitation and confusion. The patient is not nervous/anxious.      Objective:     Vital Signs (Most Recent):  Temp: 97.5 °F (36.4 °C) (10/13/20 1142)  Pulse: 62 (10/13/20 1142)  Resp: 18 (10/13/20 1142)  BP: (!) 175/76 (10/13/20 1142)  SpO2: (!) 94 % (10/13/20 1142) Vital Signs (24h Range):  Temp:  [97.5 °F (36.4 °C)-98 °F (36.7 °C)] 97.5 °F (36.4 °C)  Pulse:  [61-97] 62  Resp:  [16-18] 18  SpO2:  [92 %-100 %] 94 %  BP: (160-176)/(70-76) 175/76     Weight: (!) 137 kg (302 lb)  Body mass index is 43.33 kg/m².    Estimated Creatinine Clearance: 110 mL/min (based on SCr of 0.7 mg/dL).    Physical Exam  Constitutional:       General: She is not in acute distress.     Appearance: She is well-developed. She is not diaphoretic.   HENT:      Head: Normocephalic and atraumatic.   Eyes:      General: No scleral icterus.     Conjunctiva/sclera: Conjunctivae normal.   Cardiovascular:      Rate and Rhythm: Normal rate and regular rhythm.   Pulmonary:      Effort: Pulmonary effort is normal. No respiratory distress.      Comments: O2 via NC  Abdominal:      General: There is no distension.       Palpations: Abdomen is soft.      Tenderness: There is no abdominal tenderness. There is no guarding.   Musculoskeletal:         General: Tenderness (midline spine, flanks) present.   Skin:     General: Skin is warm and dry.      Findings: Rash (intertrigo) present.   Neurological:      Mental Status: She is alert and oriented to person, place, and time.   Psychiatric:         Mood and Affect: Mood normal.         Behavior: Behavior normal.         Thought Content: Thought content normal.         Significant Labs: All pertinent labs within the past 24 hours have been reviewed.    Significant Imaging: I have reviewed all pertinent imaging results/findings within the past 24 hours.

## 2020-10-13 NOTE — CONSULTS
Radiology Consult    Martina Betancourt is a 70 y.o. female with Alzheimer's disease. Patient was recently admitted with MRSA bacteremia. MRI of the thoracic spine with T9-T10 discitis and paravertebral abscess. IR consulted for disc/paravertebral abscess aspiration.         Past Medical History:   Diagnosis Date    Alzheimer disease     Alzheimer disease     Coronary artery disease     Hyperlipidemia     Hypertension     Myopathy     Non-ST elevation (NSTEMI) myocardial infarction     Obesity     Pneumonia     Sleep apnea      Past Surgical History:   Procedure Laterality Date    APPENDECTOMY      APPENDECTOMY      CHOLECYSTECTOMY      CORONARY STENT PLACEMENT      HYSTERECTOMY      TONSILLECTOMY         Discussed with primary team, Dr. Villarreal.    Imaging reviewed with Radiology staff, Dr. Stern    Procedure: IR disc/paravertebral aspiration.     Scheduled Meds:    aspirin  81 mg Oral Daily    bisacodyL  5 mg Oral Daily    cefTRIAXone (ROCEPHIN) IVPB  2 g Intravenous Q24H    donepeziL  10 mg Oral QHS    DULoxetine  60 mg Oral Daily    enoxaparin  40 mg Subcutaneous Q24H    furosemide  40 mg Oral Daily    isosorbide mononitrate  60 mg Oral Daily    lisinopriL  2.5 mg Oral Daily    metoprolol succinate  50 mg Oral Daily    miconazole nitrate 2%   Topical (Top) BID    pantoprazole  40 mg Oral Before breakfast    pravastatin  40 mg Oral QHS    vancomycin (VANCOCIN) IVPB  1,750 mg Intravenous Q24H     Continuous Infusions:   PRN Meds:albuterol-ipratropium, bisacodyL, glucose, glucose, HYDROcodone-acetaminophen, HYDROmorphone, melatonin, ondansetron, prochlorperazine, sodium chloride 0.9%, Pharmacy to dose Vancomycin consult **AND** vancomycin - pharmacy to dose    Allergies:   Review of patient's allergies indicates:   Allergen Reactions    Hydroxyzine hcl     Tizanidine        Labs:  No results for input(s): INR in the last 168 hours.    Invalid input(s):  PT,  PTT    Recent Labs   Lab  10/13/20  0447   WBC 8.32   HGB 10.2*   HCT 35.7*   MCV 96   *      Recent Labs   Lab 10/13/20  0450   GLU 85      K 2.8*   CL 96   CO2 36*   BUN 8   CREATININE 0.7   CALCIUM 9.6   ALT 7*   AST 11   ALBUMIN 2.0*   BILITOT 0.2         Vitals (Most Recent):  Temp: 97.5 °F (36.4 °C) (10/13/20 1142)  Pulse: 62 (10/13/20 1142)  Resp: 20 (10/13/20 1559)  BP: (!) 175/76 (10/13/20 1142)  SpO2: (!) 94 % (10/13/20 1142)      Plan:   The patient is currently stable and on antibiotics. Attempted to do procedure today but the patient wasn't NPO.     1. NPO after midnight on Wednesday (10/14/20) as we will attempt to do procedure on Thursday (10/15/20).  2. Hold one dose of Lovenox prior to day of procedure.       Fidel Bellamy MD   PGY-4 Radiology

## 2020-10-13 NOTE — ASSESSMENT & PLAN NOTE
70 year old female recently admitted to OSH with MRSA bacteremia and subsequent pneumonia treated with Zyvox x 7 days with clearance of her bacteremia now admitted with back pain found to have T9-10 osteo discitis, T8-10 epidural phlegmon with associated paraverterbral abscesses. Also noted to have bilateral complex pleural effusions. Spine infection likely hematogenous from under treated bacteremia. Neurosurgery has been consulted - no plans for acute surgical intervention. On Vancomycin and Ceftriaxone. Afebrile. HDS. Repeat blood cx sterile.    Plan  - Continue Vancomycin and Ceftriaxone.  - If neurosurgery does not plan for surgical intervention,  recommend IR drainage of abscesses if possible to send for gram stain, aerobic, anaerobic, AFB and fungal cultures   - Monitor respiratory status closely. Consider pulm consult for diagnostic/therapeutic thoracentesis.   - Plan for 6 - 8 weeks of IV antibiotics  - ID will follow.

## 2020-10-13 NOTE — PT/OT/SLP PROGRESS
"Physical Therapy Treatment  Co-Tx with OT    Patient Name:  Martina Betancourt   MRN:  [id number]  Admitting Diagnosis: Discitis  Recent Surgery: * No surgery found *      Recommendations:     Discharge Recommendations:  nursing facility, skilled(option for longterm care/assisted)   Discharge Equipment Recommendations: (TBD)   Barriers to discharge: None    Plan:     During this hospitalization, patient to be seen 3 x/week to address the above listed problems via gait training, therapeutic activities, therapeutic exercises, neuromuscular re-education  · Plan of Care Expires:  11/08/20   Plan of Care Reviewed with: patient    This Plan of care has been discussed with the patient who was involved in its development and understands and is in agreement with the identified goals and treatment plan    Subjective     Communicated with nurse (Ashley) prior to session.     Patient comments: "I'm better"  Pain/Comfort:  · Pain Rating 1: (no rating provided)  · Location - Side 1: Bilateral  · Location - Orientation 1: generalized  · Location 1: back  · Pain Addressed 1: Reposition, Distraction, Cessation of Activity  · Pain Rating Post-Intervention 1: (no rating provided)    Objective:     Patient found with: Chelita    Patient found L side lying in bed upon PT entry to room, agreeable to treatment.  No family present in the room.    General Precautions: Standard, fall Contact isolation  Orthopedic Precautions:N/A   Braces: N/A       BED MOBILITY (vc's for hand placement sequencing of task):        Rolling to the R:  Max A of 2.       Rolling to the L:  Max A of 2.        Sup > sit at the EOB:  Max A of 2 for trunk and LE's, exiting on the L side.       Sit > sup:  Max A of 2 for trunk and LE's.       Scooting hips to EOB with max A       Scooting hips along the EOB to the L requiring max A of 2 x3-4 scoots                       SITTING AT THE EDGE OF THE BED (20 min)   Assistance Level Required: initially with mod A then SBA for " trunk with B UE support   Postural deviations noted: flexed trunk, PPT   Encouraged: upright posture, APT, B feet in contact with the floor        TRANSFERS         NP 2* pain and weakness    THERAPEUTIC ACTIVITIES/EXERCISES  Pt performs B LE AROM ther ex's while seated at the EOB x15 reps with vc's    EDUCATION  Patient provided with daily orientation and goals of this PT session. They were educated to call for assistance and to transfer with hospital staff only.  Also, pt was educated on the effects of prolonged immobility and the importance of performing OOB activity and exercises to promote healing and reduce recovery time      Whiteboard updated with correct mobility information. RN/PCT notified.  Pt requires max A of 2 to sit at the EOB.    Patient left left sidelying, with  all lines intact, call button in reach, bed alarm on and nurse notified    AM-PAC 6 CLICK MOBILITY  Turning over in bed (including adjusting bedclothes, sheets and blankets)?: 2  Sitting down on and standing up from a chair with arms (e.g., wheelchair, bedside commode, etc.): 1  Moving from lying on back to sitting on the side of the bed?: 2  Moving to and from a bed to a chair (including a wheelchair)?: 1  Need to walk in hospital room?: 1  Climbing 3-5 steps with a railing?: 1  Basic Mobility Total Score: 8     Assessment:     Martina Betancourt is a 70 y.o. female admitted with a medical diagnosis of Discitis.  She presents with the following impairments/functional limitations:  weakness, impaired endurance, impaired self care skills, impaired functional mobilty, gait instability, impaired balance, decreased coordination, decreased upper extremity function, decreased lower extremity function, decreased safety awareness, pain, impaired coordination, edema. requiring significant assistance and verbal cues for bed mob, scooting to/along the EOB 2* weakness, pain and fatigue.   In light of pt's current functional level and deficits, it is  anticipated that pt will need to participate in an intense rehab program consisting of PT and OT in order to achieve full rehab potential to return to previous level of function and roles.  Pt remains motivated to participate in PT session and will cont to benefit from skilled PT intervention.      Rehab Prognosis:  Good; patient would benefit from acute skilled PT services to address these deficits and reach maximum level of function.      GOALS:   Multidisciplinary Problems     Physical Therapy Goals        Problem: Physical Therapy Goal    Goal Priority Disciplines Outcome Goal Variances Interventions   Physical Therapy Goal     PT, PT/OT Ongoing, Progressing     Description: Goals to be met by: 10/23/2020    Patient will increase functional independence with mobility by performin. Pt will perform bed mobility (rolling L/R, scooting, and bridging) with Rose.  2. Pt will perform supine to/from sit with Rose.  3. Pt will sit EOB x 10 mins with no UE support with min assistance.  4. Pt will perform sit to stand with max assistance and RW.  5. Pt will perform there-ex from handout x 15 reps to improve strength for functional mobility.                         Time Tracking:     PT Received On: 10/13/20  PT Start Time: 1048     PT Stop Time: 1121  PT Total Time (min): 33 min     Billable Minutes: Therapeutic Activity 33    Treatment Type: Treatment  PT/PTA: PTA     PTA Visit Number: 1       Viridiana Bermudez PTA.  Pager [phone]    10/13/2020    .

## 2020-10-13 NOTE — PLAN OF CARE
Problem: Physical Therapy Goal  Goal: Physical Therapy Goal  Description: Goals to be met by: 10/23/2020    Patient will increase functional independence with mobility by performin. Pt will perform bed mobility (rolling L/R, scooting, and bridging) with Rose.  2. Pt will perform supine to/from sit with Rose.  3. Pt will sit EOB x 10 mins with no UE support with min assistance.  4. Pt will perform sit to stand with max assistance and RW.  5. Pt will perform there-ex from handout x 15 reps to improve strength for functional mobility.        Outcome: Ongoing, Progressing     Discharge Recommendations: SNF    Pt requires max A of 2 to sit at the EOB.    Goals remain appropriate.     Viridiana Bermudez, PTA.   [phone]   10/13/2020

## 2020-10-13 NOTE — PLAN OF CARE
Problem: Adult Inpatient Plan of Care  Goal: Plan of Care Review  10/13/2020 0344 by Daiana Hutson RN  Outcome: Ongoing, Progressing  10/13/2020 0343 by Daiana Hutson RN  Reactivated     Problem: Bariatric Environmental Safety  Goal: Safety Maintained with Care  10/13/2020 0344 by Daiana Hutson RN  Outcome: Ongoing, Progressing  10/13/2020 0343 by Daiana Hutson RN  Reactivated     Problem: Infection  Goal: Infection Symptom Resolution  10/13/2020 0344 by Daiana Hutson RN  Outcome: Ongoing, Progressing  10/13/2020 0343 by Daiana Hutson RN  Reactivated     Problem: Wound  Goal: Optimal Wound Healing  10/13/2020 0344 by Daiana Hutson RN  Outcome: Ongoing, Progressing  10/13/2020 0343 by Daiana Hutson RN  Reactivated  Intervention: Promote Effective Wound Healing  Flowsheets (Taken 10/13/2020 0344)  Sleep/Rest Enhancement: awakenings minimized  Pain Management Interventions: care clustered     Problem: Skin Injury Risk Increased  Goal: Skin Health and Integrity  10/13/2020 0344 by Daiana Hutson RN  Outcome: Ongoing, Progressing  10/13/2020 0343 by Daiana Hutson RN  Reactivated  Intervention: Optimize Skin Protection  Flowsheets (Taken 10/13/2020 0344)  Pressure Reduction Techniques: frequent weight shift encouraged  Pressure Reduction Devices: foam padding utilized  Skin Protection: adhesive use limited  Head of Bed (HOB): HOB at 30-45 degrees       POC reviewed with patient. Fall precautions reviewed with patient. All questions and concerns addressed and answered. Medications reviewed with patient. Noted excoriation on pt bottom due to incontinence. Pt went down for scheduled CT scan. See flowsheets for further assessment and data.  NAEO. VSS. Pt call light in reach, bed alarm on, and belongings at bedside. TM

## 2020-10-14 LAB
ALBUMIN SERPL BCP-MCNC: 1.9 G/DL (ref 3.5–5.2)
ALP SERPL-CCNC: 88 U/L (ref 55–135)
ALT SERPL W/O P-5'-P-CCNC: 5 U/L (ref 10–44)
ANION GAP SERPL CALC-SCNC: 9 MMOL/L (ref 8–16)
AST SERPL-CCNC: 9 U/L (ref 10–40)
BACTERIA BLD CULT: NORMAL
BACTERIA BLD CULT: NORMAL
BASOPHILS # BLD AUTO: 0.04 K/UL (ref 0–0.2)
BASOPHILS NFR BLD: 0.5 % (ref 0–1.9)
BILIRUB SERPL-MCNC: 0.2 MG/DL (ref 0.1–1)
BUN SERPL-MCNC: 8 MG/DL (ref 8–23)
CALCIUM SERPL-MCNC: 9.9 MG/DL (ref 8.7–10.5)
CHLORIDE SERPL-SCNC: 94 MMOL/L (ref 95–110)
CO2 SERPL-SCNC: 37 MMOL/L (ref 23–29)
CREAT SERPL-MCNC: 0.7 MG/DL (ref 0.5–1.4)
DIFFERENTIAL METHOD: ABNORMAL
EOSINOPHIL # BLD AUTO: 0.1 K/UL (ref 0–0.5)
EOSINOPHIL NFR BLD: 1.1 % (ref 0–8)
ERYTHROCYTE [DISTWIDTH] IN BLOOD BY AUTOMATED COUNT: 15.5 % (ref 11.5–14.5)
EST. GFR  (AFRICAN AMERICAN): >60 ML/MIN/1.73 M^2
EST. GFR  (NON AFRICAN AMERICAN): >60 ML/MIN/1.73 M^2
GLUCOSE SERPL-MCNC: 89 MG/DL (ref 70–110)
HCT VFR BLD AUTO: 34.4 % (ref 37–48.5)
HGB BLD-MCNC: 10.1 G/DL (ref 12–16)
IMM GRANULOCYTES # BLD AUTO: 0.02 K/UL (ref 0–0.04)
IMM GRANULOCYTES NFR BLD AUTO: 0.2 % (ref 0–0.5)
LYMPHOCYTES # BLD AUTO: 1.8 K/UL (ref 1–4.8)
LYMPHOCYTES NFR BLD: 21.2 % (ref 18–48)
MCH RBC QN AUTO: 27.4 PG (ref 27–31)
MCHC RBC AUTO-ENTMCNC: 29.4 G/DL (ref 32–36)
MCV RBC AUTO: 94 FL (ref 82–98)
MONOCYTES # BLD AUTO: 1.1 K/UL (ref 0.3–1)
MONOCYTES NFR BLD: 13.4 % (ref 4–15)
NEUTROPHILS # BLD AUTO: 5.4 K/UL (ref 1.8–7.7)
NEUTROPHILS NFR BLD: 63.6 % (ref 38–73)
NRBC BLD-RTO: 0 /100 WBC
PLATELET # BLD AUTO: 468 K/UL (ref 150–350)
PMV BLD AUTO: 9.5 FL (ref 9.2–12.9)
POCT GLUCOSE: 104 MG/DL (ref 70–110)
POCT GLUCOSE: 99 MG/DL (ref 70–110)
POTASSIUM SERPL-SCNC: 2.7 MMOL/L (ref 3.5–5.1)
PROT SERPL-MCNC: 7 G/DL (ref 6–8.4)
RBC # BLD AUTO: 3.68 M/UL (ref 4–5.4)
SODIUM SERPL-SCNC: 140 MMOL/L (ref 136–145)
VANCOMYCIN TROUGH SERPL-MCNC: 23.3 UG/ML (ref 10–22)
WBC # BLD AUTO: 8.52 K/UL (ref 3.9–12.7)

## 2020-10-14 PROCEDURE — 63600175 PHARM REV CODE 636 W HCPCS: Performed by: INTERNAL MEDICINE

## 2020-10-14 PROCEDURE — 80202 ASSAY OF VANCOMYCIN: CPT

## 2020-10-14 PROCEDURE — 99233 PR SUBSEQUENT HOSPITAL CARE,LEVL III: ICD-10-PCS | Mod: ,,, | Performed by: PHYSICIAN ASSISTANT

## 2020-10-14 PROCEDURE — 11000001 HC ACUTE MED/SURG PRIVATE ROOM

## 2020-10-14 PROCEDURE — 97110 THERAPEUTIC EXERCISES: CPT

## 2020-10-14 PROCEDURE — 99233 SBSQ HOSP IP/OBS HIGH 50: CPT | Mod: ,,, | Performed by: PHYSICIAN ASSISTANT

## 2020-10-14 PROCEDURE — 80053 COMPREHEN METABOLIC PANEL: CPT

## 2020-10-14 PROCEDURE — 25000003 PHARM REV CODE 250: Performed by: INTERNAL MEDICINE

## 2020-10-14 PROCEDURE — 25000003 PHARM REV CODE 250: Performed by: HOSPITALIST

## 2020-10-14 PROCEDURE — 99232 SBSQ HOSP IP/OBS MODERATE 35: CPT | Mod: ,,, | Performed by: INTERNAL MEDICINE

## 2020-10-14 PROCEDURE — 97530 THERAPEUTIC ACTIVITIES: CPT | Mod: CQ

## 2020-10-14 PROCEDURE — 85025 COMPLETE CBC W/AUTO DIFF WBC: CPT

## 2020-10-14 PROCEDURE — 97535 SELF CARE MNGMENT TRAINING: CPT

## 2020-10-14 PROCEDURE — 63600175 PHARM REV CODE 636 W HCPCS: Performed by: PHYSICIAN ASSISTANT

## 2020-10-14 PROCEDURE — 99232 PR SUBSEQUENT HOSPITAL CARE,LEVL II: ICD-10-PCS | Mod: ,,, | Performed by: INTERNAL MEDICINE

## 2020-10-14 RX ORDER — POTASSIUM CHLORIDE 7.45 MG/ML
10 INJECTION INTRAVENOUS
Status: COMPLETED | OUTPATIENT
Start: 2020-10-14 | End: 2020-10-14

## 2020-10-14 RX ADMIN — HYDROCODONE BITARTRATE AND ACETAMINOPHEN 1 TABLET: 10; 325 TABLET ORAL at 05:10

## 2020-10-14 RX ADMIN — POTASSIUM CHLORIDE 10 MEQ: 7.46 INJECTION, SOLUTION INTRAVENOUS at 10:10

## 2020-10-14 RX ADMIN — POTASSIUM CHLORIDE 10 MEQ: 7.46 INJECTION, SOLUTION INTRAVENOUS at 12:10

## 2020-10-14 RX ADMIN — POTASSIUM CHLORIDE 10 MEQ: 7.46 INJECTION, SOLUTION INTRAVENOUS at 11:10

## 2020-10-14 RX ADMIN — PANTOPRAZOLE SODIUM 40 MG: 40 TABLET, DELAYED RELEASE ORAL at 05:10

## 2020-10-14 RX ADMIN — ISOSORBIDE MONONITRATE 60 MG: 30 TABLET, EXTENDED RELEASE ORAL at 10:10

## 2020-10-14 RX ADMIN — CEFTRIAXONE SODIUM 2 G: 2 INJECTION, SOLUTION INTRAVENOUS at 09:10

## 2020-10-14 RX ADMIN — MICONAZOLE NITRATE: 20 OINTMENT TOPICAL at 08:10

## 2020-10-14 RX ADMIN — HYDROCODONE BITARTRATE AND ACETAMINOPHEN 1 TABLET: 10; 325 TABLET ORAL at 11:10

## 2020-10-14 RX ADMIN — DULOXETINE HYDROCHLORIDE 60 MG: 30 CAPSULE, DELAYED RELEASE ORAL at 08:10

## 2020-10-14 RX ADMIN — VANCOMYCIN HYDROCHLORIDE 1750 MG: 10 INJECTION, POWDER, LYOPHILIZED, FOR SOLUTION INTRAVENOUS at 05:10

## 2020-10-14 RX ADMIN — MICONAZOLE NITRATE: 20 OINTMENT TOPICAL at 09:10

## 2020-10-14 RX ADMIN — POTASSIUM CHLORIDE 10 MEQ: 7.46 INJECTION, SOLUTION INTRAVENOUS at 03:10

## 2020-10-14 RX ADMIN — DONEPEZIL HYDROCHLORIDE 10 MG: 10 TABLET ORAL at 09:10

## 2020-10-14 RX ADMIN — FUROSEMIDE 40 MG: 40 TABLET ORAL at 08:10

## 2020-10-14 RX ADMIN — METOPROLOL SUCCINATE 50 MG: 50 TABLET, EXTENDED RELEASE ORAL at 08:10

## 2020-10-14 RX ADMIN — PRAVASTATIN SODIUM 40 MG: 40 TABLET ORAL at 09:10

## 2020-10-14 RX ADMIN — LISINOPRIL 2.5 MG: 2.5 TABLET ORAL at 08:10

## 2020-10-14 RX ADMIN — BISACODYL 5 MG: 5 TABLET, COATED ORAL at 08:10

## 2020-10-14 RX ADMIN — ASPIRIN 81 MG: 81 TABLET, COATED ORAL at 08:10

## 2020-10-14 RX ADMIN — POTASSIUM BICARBONATE 50 MEQ: 978 TABLET, EFFERVESCENT ORAL at 11:10

## 2020-10-14 NOTE — ASSESSMENT & PLAN NOTE
70 year old female recently admitted to OSH with MRSA bacteremia and subsequent pneumonia treated with Zyvox x 7 days with clearance of her bacteremia now admitted with back pain found to have T9-10 osteo discitis, T8-10 epidural phlegmon with associated paraverterbral abscesses. Also noted to have bilateral complex pleural effusions. Spine infection likely hematogenous from under treated bacteremia. Neurosurgery has been consulted - unfortunately no plans for surgical intervention at this time. IR is planning for IR aspiration tomorrow. On Vancomycin and Ceftriaxone. Afebrile. HDS. Repeat blood cx sterile.    Plan  - Continue Vancomycin and Ceftriaxone. Vanc trough goal 15-20.  - As neurosurgery does not plan for surgical intervention,  recommend IR drainage of abscesses and send fluid for gram stain, aerobic, anaerobic, AFB and fungal cultures   - Monitor respiratory status closely. Consider pulm consult for diagnostic/therapeutic thoracentesis.   - Plan for 6 - 8 weeks of IV antibiotics  - ID will follow.

## 2020-10-14 NOTE — PROGRESS NOTES
[facility]  Neurosurgery  Progress Note    Subjective:     History of Present Illness: Patient is a 70 year old F with a PMHx of Alzheimer's dz, HTN, HLD, CAD, YOAVNI, and morbid obesity who was recently hospitalized x2 for MRSA bacteremia complicated by pneumonia and possible UTI, treated with linozolid x8 days and cipro and discharged 10/1. She was admitted to [facility] 10/8 from ED, presenting for excruciating flank pain x 24 hours. Ct renal stone study negative, but revealed T9-T10 discitis. Non-contrast MRI T spine prior to transfer revealed T9-T10 discitis/osteomyelitis with vertebral bone erosion and possible adjacent soft tissue abscesses. Patient transfer 10/8, began IV vanc 10/8 and IV rocephin 10/9. On neurosx consult exam, patient is awake alert and cooperative, AOx4. 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 Patient reports nonambulatory at baseline, denies changes to her activity other than pain limitation. Neurologically intact on PEx, no evidence of motor/sensory deficit.           Post-Op Info:  * No surgery found *         Interval History: NAEON. Plan for IR biopsy tomorrow. Denies weakness, paresthesias, or bladder incontinence/retention. Continues with constipation.     Medications:  Continuous Infusions:  Scheduled Meds:   aspirin  81 mg Oral Daily    bisacodyL  5 mg Oral Daily    cefTRIAXone (ROCEPHIN) IVPB  2 g Intravenous Q24H    donepeziL  10 mg Oral QHS    DULoxetine  60 mg Oral Daily    enoxaparin  40 mg Subcutaneous Q24H    furosemide  40 mg Oral Daily    isosorbide mononitrate  60 mg Oral Daily    lisinopriL  2.5 mg Oral Daily    metoprolol succinate  50 mg Oral Daily    miconazole nitrate 2%   Topical (Top) BID     pantoprazole  40 mg Oral Before breakfast    potassium bicarbonate  50 mEq Oral Once    potassium chloride in water  10 mEq Intravenous Q1H    pravastatin  40 mg Oral QHS    vancomycin (VANCOCIN) IVPB  1,750 mg Intravenous Q24H     PRN Meds:albuterol-ipratropium, bisacodyL, glucose, glucose, HYDROcodone-acetaminophen, HYDROmorphone, melatonin, ondansetron, prochlorperazine, sodium chloride 0.9%, Pharmacy to dose Vancomycin consult **AND** vancomycin - pharmacy to dose     Review of Systems  Objective:     Weight: (!) 137 kg (302 lb)  Body mass index is 43.33 kg/m².  Vital Signs (Most Recent):  Temp: 98.3 °F (36.8 °C) (10/14/20 0442)  Pulse: 65 (10/14/20 0850)  Resp: 18 (10/14/20 0547)  BP: (!) 163/79 (10/14/20 0850)  SpO2: (!) 93 % (10/14/20 0442) Vital Signs (24h Range):  Temp:  [97.5 °F (36.4 °C)-98.6 °F (37 °C)] 98.3 °F (36.8 °C)  Pulse:  [62-70] 65  Resp:  [16-20] 18  SpO2:  [90 %-94 %] 93 %  BP: (138-175)/(68-80) 163/79                     Female External Urinary Catheter 10/07/20 1700 (Active)   Skin reddened;perineum cleansed w/ soap and water 10/13/20 2000   Tolerance no signs/symptoms of discomfort 10/13/20 2000   Suction Continuous suction at 60 mmHg 10/13/20 2000   Date of last wick change 10/13/20 10/13/20 2000   Time of last wick change 0449 10/08/20 2200   Output (mL) 350 mL 10/10/20 0600       Neurosurgery Physical Exam  General: well developed, morbidly obese, no distress.   Head: normocephalic, atraumatic. NC in place.  Neck: No tracheal deviation. Full ROM.   Neurologic: Alert and oriented. Thought content appropriate.  GCS: Motor: 6/Verbal: 5/Eyes: 4 GCS Total: 15  Mental Status: Awake, Alert, Oriented x 4  Language: No aphasia  Speech: No dysarthria  Cranial nerves: face symmetric, tongue midline, CN II-XII grossly intact.   Eyes: pupils equal, round, reactive to light with accomodation, EOMI.   Ears: No drainage.   Pulmonary: normal respirations, no signs of respiratory  distress     Sensory: baseline neuropathy of right plantar foot, otherwise intact to light touch throughout  Motor Strength: Moves all extremities spontaneously with good tone.  Full strength upper and lower extremities. No abnormal movements seen. Range of motion limited 2/2 body habitus.      Strength   Deltoids Triceps Biceps Wrist Extension Wrist Flexion Hand    Upper: R 5/5 5/5 5/5 5/5 5/5 5/5     L 5/5 5/5 5/5 5/5 5/5 5/5       Iliopsoas Quadriceps Knee  Flexion Tibialis  anterior Gastro- cnemius EHL   Lower: R 5/5 5/5 5/5 5/5 5/5 5/5     L 5/5 5/5 5/5 5/5 5/5 5/5      Stein: absent  Clonus: absent  Skin: Skin is warm, dry and intact.    Significant Labs:  Recent Labs   Lab 10/13/20  0450 10/14/20  0502   GLU 85 89    140   K 2.8* 2.7*   CL 96 94*   CO2 36* 37*   BUN 8 8   CREATININE 0.7 0.7   CALCIUM 9.6 9.9     Recent Labs   Lab 10/13/20  0447 10/14/20  0501   WBC 8.32 8.52   HGB 10.2* 10.1*   HCT 35.7* 34.4*   * 468*     No results for input(s): LABPT, INR, APTT in the last 48 hours.  Microbiology Results (last 7 days)     Procedure Component Value Units Date/Time    Blood culture [[id number]] Collected: 10/08/20 2345    Order Status: Completed Specimen: Blood Updated: 10/14/20 0612     Blood Culture, Routine No growth after 5 days.    Blood culture [[id number]] Collected: 10/08/20 2344    Order Status: Completed Specimen: Blood Updated: 10/14/20 0612     Blood Culture, Routine No growth after 5 days.        All pertinent labs from the last 24 hours have been reviewed.    Significant Diagnostics:  No results found in the last 24 hours.      Assessment/Plan:     * Discitis  Martina Betancourt is a 70 y.o. female with recent MRSA bacteremia who presents with new onselt back pain, found to haveT9-T10 discitis with bone erosion and possible adjacent soft tissue abscesses.     Neurologically intact on exam.     - All labs and imaging personally reviewed  - q4 neuro checks  - MRI w wo contrast of  thoracic and lumbar spine shows discitis/osteomyelitis at T9-T10 with T8-T10 phlegmon. There is a bilateral spondylolysis at L5 and central canal narrowing from L3-L5.   - CT thoracic/lumbar spine shows T9-T10 discitis/osteomyelitis. No retropulsion, no focal kyphosis.   - No emergent neurosurgical intervention indicated at this time. Plan for IR biopsy tomorrow, 10/15.   - ID: Recent hospitalization for MRSA bacteremia, likely infectious cause. Blood cultures 10/8 NGTD. WBC wnl, afebrile.    - ID recommending vanc + rocephin anticipated for 6-8 weeks.   - Back Pain: Per primary team. Not well managed on current regimen of Norco 10 mg PRN. Dilaudid ordered PRN but pt not taking.   - Recommend trial of muscle relaxer and gabapentin for improved multimodal pain control  - Constipation: Recommend escalate bowel regimen for c/o constipation   - Bilateral pleural effusions: seen on CT lumbar spine. On 4L NC. ID recommending pulm consult for diagnostic/therapeutic thoracentesis.  - Please notify neurosx for any changes in neuro exam              Rylie Manzo PA-C  Neurosurgery  [facility]

## 2020-10-14 NOTE — PT/OT/SLP PROGRESS
"Occupational Therapy   Treatment  Co-tx with PT    Name: Martina Betancourt  MRN: [id number]  Admitting Diagnosis:  Discitis       Recommendations:     Discharge Recommendations: nursing facility, skilled  Discharge Equipment Recommendations:  (TBD)  Barriers to discharge:  Decreased caregiver support(Level of assistance required)    Assessment:     Martina Betancourt is a 70 y.o. female with a medical diagnosis of Discitis.  She presents with the following performance deficits affecting function are weakness, impaired endurance, impaired self care skills, impaired functional mobilty, gait instability, impaired balance, decreased upper extremity function, decreased lower extremity function, pain, impaired skin, edema. Patient participated well with therapy, required maximal assist x 2 persons for bed mobility and lateral scoots edge of bed, participated with therapeutic exercises. Patient benefited from cueing for adaptive strategies and increased time for rest breaks. Patient will continue to benefit from skilled therapy services during this admission and placement in SNF to address deficits listed above to maximize functional independence.     Rehab Prognosis:  Good; patient would benefit from acute skilled OT services to address these deficits and reach maximum level of function.       Plan:     Patient to be seen 3 x/week to address the above listed problems via self-care/home management, therapeutic activities, therapeutic exercises  · Plan of Care Expires: 11/09/20  · Plan of Care Reviewed with: patient    Subjective   "I will try."    Pain/Comfort:  · Pain Rating 1: (no rating provided)  · Location - Side 1: Bilateral  · Location - Orientation 1: generalized  · Location 1: back  · Pain Addressed 1: Reposition, Distraction, Cessation of Activity  · Pain Rating Post-Intervention 1: (no rating provided)    Objective:     Communicated with: Nursing prior to session.  Patient found supine with oxygen, PureWick, " peripheral IV upon OT entry to room.    General Precautions: Standard, contact, fall   Orthopedic Precautions:N/A   Braces:       Occupational Performance:     Bed Mobility:    · Patient completed Rolling/Turning to Left with  maximal assistance and 2 persons  · Patient completed Rolling/Turning to Right with maximal assistance and 2 persons  · Patient completed Scooting/Bridging with maximal assistance and 2 persons, manual assist to weight shift and cues for UE placement  · Patient completed Supine to Sit with maximal assistance and 2 persons, to L side of bed due to patient's increased pain on R side  · Patient completed Sit to Supine with maximal assistance and 2 persons     Activities of Daily Living:  · Grooming: stand by assistance seated edge of bed, patient able to maintain sitting balance without UE support  · Upper Body Dressing: contact guard assistance seated edge of bed    AMPAC 6 Click ADL:      Treatment & Education:  Patient completed therex seated edge of bed with focus on upright posture and breathing techniques through out, completed all of the following x 10 repititions:  -Scapular elevation, scapular retraction/protraction  -Shoulder flexion and abduction  -Trunk excursions within frontal plane  -Elbow flex/ext   -Patient benefited from cues for full ROM and educated to complete with head of bed elevated as tolerated.    Patient left supine with all lines intact, call button in reach and nursing notifiedEducation:      GOALS:   Multidisciplinary Problems     Occupational Therapy Goals        Problem: Occupational Therapy Goal    Goal Priority Disciplines Outcome Interventions   Occupational Therapy Goal     OT, PT/OT Ongoing, Progressing    Description: Goals to be met by: 10/16/2020     Patient will increase functional independence with ADLs by performing:    Feeding with Minimal Assistance seated EOB.  UE Dressing with Minimal Assistance seated EOB.  Grooming while EOB with Minimal  Assistance.  Bathing from edge of bed with wipes with Minimal Assistance.  Rolling to Bilateral with Moderate Assistance.   Supine to sit with Moderate Assistance.                     Time Tracking:     OT Date of Treatment: 10/13/20  OT Start Time: 1048  OT Stop Time: 1129  OT Total Time (min): 41 min    Billable Minutes:Self Care/Home Management 15  Therapeutic Exercise 26    Neetu Ramirez, OT  10/14/2020

## 2020-10-14 NOTE — PLAN OF CARE
Problem: Adult Inpatient Plan of Care  Goal: Plan of Care Review  Outcome: Ongoing, Progressing  Goal: Optimal Comfort and Wellbeing  Outcome: Ongoing, Progressing  Intervention: Provide Person-Centered Care  Flowsheets (Taken 10/14/2020 6671)  Trust Relationship/Rapport:   care explained   choices provided   emotional support provided   empathic listening provided   questions answered   questions encouraged   reassurance provided   thoughts/feelings acknowledged     Problem: Bariatric Environmental Safety  Goal: Safety Maintained with Care  Outcome: Ongoing, Progressing  Patient safety maintained with safety interventions of bed positioning and locked wheels, lighting adjusted, call bell in reach, frequent checks through out the shift, and bed alarm on.  Patient comfort level maintained by medications administered as ordered.  VSS.

## 2020-10-14 NOTE — PLAN OF CARE
Problem: Physical Therapy Goal  Goal: Physical Therapy Goal  Description: Goals to be met by: 10/23/2020    Patient will increase functional independence with mobility by performin. Pt will perform bed mobility (rolling L/R, scooting, and bridging) with Rose.  2. Pt will perform supine to/from sit with Rose.  3. Pt will sit EOB x 10 mins with no UE support with min assistance.  4. Pt will perform sit to stand with max assistance and RW.  5. Pt will perform there-ex from handout x 15 reps to improve strength for functional mobility.        Outcome: Ongoing, Progressing     Discharge Recommendations: SNF    Pt requires max A of 2 to sit at the EOB.    Goals remain appropriate.     Viridiana Bermudez, PTA.   [phone]   10/14/2020

## 2020-10-14 NOTE — PT/OT/SLP PROGRESS
"  Physical Therapy Treatment  Co-Tx with OT    Patient Name:  Martina Betancourt   MRN:  [id number]  Admitting Diagnosis: Discitis  Recent Surgery: * No surgery found *      Recommendations:     Discharge Recommendations:  nursing facility, skilled(option for longterm/detention)   Discharge Equipment Recommendations: (TBD)   Barriers to discharge: Decreased caregiver support    Plan:     During this hospitalization, patient to be seen 3 x/week to address the above listed problems via gait training, therapeutic activities, therapeutic exercises, neuromuscular re-education  · Plan of Care Expires:  11/08/20   Plan of Care Reviewed with: patient    This Plan of care has been discussed with the patient who was involved in its development and understands and is in agreement with the identified goals and treatment plan    Subjective     Communicated with nurse (Ashley) prior to session.     Patient comments: "I think I need to go"  Pain/Comfort:  · Pain Rating 1: (no rating provided)  · Location - Orientation 1: generalized  · Location 1: back  · Pain Addressed 1: Pre-medicate for activity, Reposition, Distraction, Cessation of Activity  · Pain Rating Post-Intervention 1: (no rating provided)    Objective:     Patient found with: oxygen, PureWick, peripheral IV    Patient found sup in bed upon PT entry to room, agreeable to treatment.  NO family present in the room.    General Precautions: Standard, fall, contact(MRSA in blood)   Orthopedic Precautions:N/A   Braces: N/A     Pt requires assistance for pad change and pericare    BED MOBILITY (vc's for hand placement sequencing of task):        Rolling to the R:  Max A of 2.       Rolling to the L:  Max A of 2.        Sup > sit at the EOB:  Max A of 2 for trunk and LE's, exiting on the L side.       Sit > sup:  Max A of 2 for trunk and LE's.       Scooting hips to EOB with max A       Scooting hips along the EOB to the L requiring max A of 2 x3-4 scoots                    "    SITTING AT THE EDGE OF THE BED (30 min)   Assistance Level Required: SBA for safety, occasional CGA for trunk,  with B UE support   Postural deviations noted: flexed trunk, L post lean, decreased weight shift to the R   Encouraged: upright posture, APT, B feet in contact with the floor        TRANSFERS  (vc's for hand placement, sequencing of task and safety)   Patient completed Sit <> Stand Transfer from EOB with max A of 1-2 for hip ext, B knee ext and balance with no assistive device x2 trial(s). Pt able to clear hips, however, she does not achieve full erect posture   Patient completed Stand <> Sit Transfer to EOB with max A for controlled descent with no assistive device        THERAPEUTIC ACTIVITIES/EXERCISES  Pt performs B LE AAROM ther ex's while sup in bed x8 reps with vc's    EDUCATION  Patient provided with daily orientation and goals of this PT session. They were educated to call for assistance and to transfer with hospital staff only.  Also, pt was educated on the effects of prolonged immobility and the importance of performing OOB activity and exercises to promote healing and reduce recovery time      Whiteboard updated with correct mobility information. RN/PCT notified.  Pt requires max A of 2 to sit at the EOB.    Patient left left sidelying, with  all lines intact, call button in reach, bed alarm on and nurse notified    AM-PAC 6 CLICK MOBILITY  Turning over in bed (including adjusting bedclothes, sheets and blankets)?: 2  Sitting down on and standing up from a chair with arms (e.g., wheelchair, bedside commode, etc.): 1  Moving from lying on back to sitting on the side of the bed?: 2  Moving to and from a bed to a chair (including a wheelchair)?: 1  Need to walk in hospital room?: 1  Climbing 3-5 steps with a railing?: 1  Basic Mobility Total Score: 8     Assessment:     Martina Betancourt is a 70 y.o. female admitted with a medical diagnosis of Discitis.  She presents with the following  impairments/functional limitations:  weakness, impaired endurance, impaired self care skills, impaired functional mobilty, gait instability, impaired balance, decreased upper extremity function, decreased lower extremity function, pain, impaired skin, edema. requiring significant assistance and verbal cues for bed mob, scooting to/along the EOB, sit < > stand, static standing to prevent falls due to weakness, pain and fatigue.   In light of pt's current functional level and deficits, it is anticipated that pt will need to participate in an intense rehab program consisting of PT and OT in order to achieve full rehab potential to return to previous level of function and roles.  Pt remains motivated to participate in PT session and will cont to benefit from skilled PT intervention.      Rehab Prognosis:  Good; patient would benefit from acute skilled PT services to address these deficits and reach maximum level of function.      GOALS:   Multidisciplinary Problems     Physical Therapy Goals        Problem: Physical Therapy Goal    Goal Priority Disciplines Outcome Goal Variances Interventions   Physical Therapy Goal     PT, PT/OT Ongoing, Progressing     Description: Goals to be met by: 10/23/2020    Patient will increase functional independence with mobility by performin. Pt will perform bed mobility (rolling L/R, scooting, and bridging) with Rose.  2. Pt will perform supine to/from sit with Rose.  3. Pt will sit EOB x 10 mins with no UE support with min assistance.  4. Pt will perform sit to stand with max assistance and RW.  5. Pt will perform there-ex from handout x 15 reps to improve strength for functional mobility.                         Time Tracking:     PT Received On: 10/14/20  PT Start Time: 1149     PT Stop Time: 1232  PT Total Time (min): 43 min     Billable Minutes: Therapeutic Activity 43    Treatment Type: Treatment  PT/PTA: PTA     PTA Visit Number: 2       Viridiana Bermudez PTA.  Pager  [phone]    10/14/2020    .

## 2020-10-14 NOTE — SUBJECTIVE & OBJECTIVE
Interval History: NAEON. Plan for IR biopsy tomorrow. Denies weakness, paresthesias, or bladder incontinence/retention. Continues with constipation.     Medications:  Continuous Infusions:  Scheduled Meds:   aspirin  81 mg Oral Daily    bisacodyL  5 mg Oral Daily    cefTRIAXone (ROCEPHIN) IVPB  2 g Intravenous Q24H    donepeziL  10 mg Oral QHS    DULoxetine  60 mg Oral Daily    enoxaparin  40 mg Subcutaneous Q24H    furosemide  40 mg Oral Daily    isosorbide mononitrate  60 mg Oral Daily    lisinopriL  2.5 mg Oral Daily    metoprolol succinate  50 mg Oral Daily    miconazole nitrate 2%   Topical (Top) BID    pantoprazole  40 mg Oral Before breakfast    potassium bicarbonate  50 mEq Oral Once    potassium chloride in water  10 mEq Intravenous Q1H    pravastatin  40 mg Oral QHS    vancomycin (VANCOCIN) IVPB  1,750 mg Intravenous Q24H     PRN Meds:albuterol-ipratropium, bisacodyL, glucose, glucose, HYDROcodone-acetaminophen, HYDROmorphone, melatonin, ondansetron, prochlorperazine, sodium chloride 0.9%, Pharmacy to dose Vancomycin consult **AND** vancomycin - pharmacy to dose     Review of Systems  Objective:     Weight: (!) 137 kg (302 lb)  Body mass index is 43.33 kg/m².  Vital Signs (Most Recent):  Temp: 98.3 °F (36.8 °C) (10/14/20 0442)  Pulse: 65 (10/14/20 0850)  Resp: 18 (10/14/20 0547)  BP: (!) 163/79 (10/14/20 0850)  SpO2: (!) 93 % (10/14/20 0442) Vital Signs (24h Range):  Temp:  [97.5 °F (36.4 °C)-98.6 °F (37 °C)] 98.3 °F (36.8 °C)  Pulse:  [62-70] 65  Resp:  [16-20] 18  SpO2:  [90 %-94 %] 93 %  BP: (138-175)/(68-80) 163/79                     Female External Urinary Catheter 10/07/20 1700 (Active)   Skin reddened;perineum cleansed w/ soap and water 10/13/20 2000   Tolerance no signs/symptoms of discomfort 10/13/20 2000   Suction Continuous suction at 60 mmHg 10/13/20 2000   Date of last wick change 10/13/20 10/13/20 2000   Time of last wick change 0449 10/08/20 2200   Output (mL) 350 mL  10/10/20 0600       Neurosurgery Physical Exam  General: well developed, morbidly obese, no distress.   Head: normocephalic, atraumatic. NC in place.  Neck: No tracheal deviation. Full ROM.   Neurologic: Alert and oriented. Thought content appropriate.  GCS: Motor: 6/Verbal: 5/Eyes: 4 GCS Total: 15  Mental Status: Awake, Alert, Oriented x 4  Language: No aphasia  Speech: No dysarthria  Cranial nerves: face symmetric, tongue midline, CN II-XII grossly intact.   Eyes: pupils equal, round, reactive to light with accomodation, EOMI.   Ears: No drainage.   Pulmonary: normal respirations, no signs of respiratory distress     Sensory: baseline neuropathy of right plantar foot, otherwise intact to light touch throughout  Motor Strength: Moves all extremities spontaneously with good tone.  Full strength upper and lower extremities. No abnormal movements seen. Range of motion limited 2/2 body habitus.      Strength   Deltoids Triceps Biceps Wrist Extension Wrist Flexion Hand    Upper: R 5/5 5/5 5/5 5/5 5/5 5/5     L 5/5 5/5 5/5 5/5 5/5 5/5       Iliopsoas Quadriceps Knee  Flexion Tibialis  anterior Gastro- cnemius EHL   Lower: R 5/5 5/5 5/5 5/5 5/5 5/5     L 5/5 5/5 5/5 5/5 5/5 5/5      Stein: absent  Clonus: absent  Skin: Skin is warm, dry and intact.    Significant Labs:  Recent Labs   Lab 10/13/20  0450 10/14/20  0502   GLU 85 89    140   K 2.8* 2.7*   CL 96 94*   CO2 36* 37*   BUN 8 8   CREATININE 0.7 0.7   CALCIUM 9.6 9.9     Recent Labs   Lab 10/13/20  0447 10/14/20  0501   WBC 8.32 8.52   HGB 10.2* 10.1*   HCT 35.7* 34.4*   * 468*     No results for input(s): LABPT, INR, APTT in the last 48 hours.  Microbiology Results (last 7 days)     Procedure Component Value Units Date/Time    Blood culture [[id number]] Collected: 10/08/20 5178    Order Status: Completed Specimen: Blood Updated: 10/14/20 0612     Blood Culture, Routine No growth after 5 days.    Blood culture [[id number]] Collected: 10/08/20 2346     Order Status: Completed Specimen: Blood Updated: 10/14/20 0612     Blood Culture, Routine No growth after 5 days.        All pertinent labs from the last 24 hours have been reviewed.    Significant Diagnostics:  No results found in the last 24 hours.

## 2020-10-14 NOTE — PROGRESS NOTES
Pharmacokinetic Assessment Follow Up: IV Vancomycin    Vancomycin serum concentration assessment(s):  · Vancomcyin trough of 23.3 mcg/mL was drawn inappropriately 13 hrs after previous dose  · Renal function stable    Vancomycin Regimen Plan:  1. Will continue current dose of vancomycin 1750mg q24 hours  2. Next trough prior to 3rd dose on 10/15    Drug levels (last 3 results):  Recent Labs   Lab Result Units 10/12/20  0522 10/13/20  0447 10/14/20  0501   Vancomycin, Random ug/mL 24.6 20.7  --    Vancomycin-Trough ug/mL  --   --  23.3*       Pharmacy will continue to follow and monitor vancomycin.    Please contact pharmacy at extension [phone] for questions regarding this assessment.    Thank you for the consult,   Shannan Rajan       Patient brief summary:  Martina Betancourt is a 70 y.o. female initiated on antimicrobial therapy with IV Vancomycin for treatment of bone/joint infection      Drug Allergies:   Review of patient's allergies indicates:   Allergen Reactions    Hydroxyzine hcl     Tizanidine        Actual Body Weight:   137 kg    Renal Function:   Estimated Creatinine Clearance: 110 mL/min (based on SCr of 0.7 mg/dL).,       CBC (last 72 hours):  Recent Labs   Lab Result Units 10/12/20  0522 10/13/20  0447 10/14/20  0501   WBC K/uL 8.25 8.32 8.52   Hemoglobin g/dL 10.0* 10.2* 10.1*   Hematocrit % 34.8* 35.7* 34.4*   Platelets K/uL 486* 441* 468*   Gran% % 62.7 63.7 63.6   Lymph% % 21.8 23.0 21.2   Mono% % 13.6 11.7 13.4   Eosinophil% % 1.2 0.8 1.1   Basophil% % 0.5 0.4 0.5   Differential Method  Automated Automated Automated       Metabolic Panel (last 72 hours):  Recent Labs   Lab Result Units 10/12/20  0522 10/13/20  0450 10/14/20  0502   Sodium mmol/L 140 141 140   Potassium mmol/L 2.8* 2.8* 2.7*   Chloride mmol/L 95 96 94*   CO2 mmol/L 36* 36* 37*   Glucose mg/dL 87 85 89   BUN, Bld mg/dL 7* 8 8   Creatinine mg/dL 0.7 0.7 0.7   Albumin g/dL 1.9* 2.0* 1.9*   Total Bilirubin mg/dL 0.2 0.2 0.2   Alkaline  Phosphatase U/L 84 86 88   AST U/L 9* 11 9*   ALT U/L 7* 7* 5*       Vancomycin Administrations:  vancomycin given in the last 96 hours                   vancomycin 1.75 g in 5 % dextrose 500 mL IVPB (mg) 1,750 mg New Bag 10/13/20 1603    vancomycin 1.75 g in 5 % dextrose 500 mL IVPB (mg) 1,750 mg New Bag 10/12/20 0610    vancomycin (VANCOCIN) 2,500 mg in dextrose 5 % 500 mL IVPB (mg) 2,500 mg New Bag 10/11/20 0548                Microbiologic Results:  Microbiology Results (last 7 days)     Procedure Component Value Units Date/Time    Blood culture [[id number]] Collected: 10/08/20 2345    Order Status: Completed Specimen: Blood Updated: 10/14/20 0612     Blood Culture, Routine No growth after 5 days.    Blood culture [[id number]] Collected: 10/08/20 2344    Order Status: Completed Specimen: Blood Updated: 10/14/20 0612     Blood Culture, Routine No growth after 5 days.

## 2020-10-14 NOTE — PROGRESS NOTES
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 Also noted to have bilateral complex pleural effusions. Spine infection likely hematogenous from under treated bacteremia. Neurosurgery has been consulted - unfortunately no plans for surgical intervention at this time. IR is planning for IR aspiration tomorrow. On Vancomycin and Ceftriaxone. Afebrile. HDS. Repeat blood cx sterile.    Plan  - Continue Vancomycin and Ceftriaxone. Vanc trough goal 15-20.  - As neurosurgery does not plan for surgical intervention,  recommend IR drainage of abscesses and send fluid for gram stain, aerobic, anaerobic, AFB and fungal cultures   - Monitor respiratory status closely. Consider pulm consult for diagnostic/therapeutic thoracentesis.   - Plan for 6 - 8 weeks of IV antibiotics  - ID will follow.               Please call for any questions. Thank you.  Krystina Mendoza PA-C  Phone: [phone]  Pager: [phone]      Subjective:     Principal Problem:Discitis    HPI: Patient is a 70-year-old female with a history of recent hospitalization x2 including a Staph aureus bacteremia and then a subsequent pneumonia who at the last hospitalization about 1-2 weeks ago made a significant improvement with antibiotics return home with her daughter.  The patient reports she was doing well until about 24 hours ago began having excruciating flank pain.  In the ED she had imaging  studies that showed a diskitis at the bottom thoracic spine levels and the concern of a possible epidural abscess/vertebral infection was entertained.  The case was discussed with an outside facility that had neurosurgical services and they felt that an MRI was needed before transfer.  She was transferred from [facility].  The patient has some dementia and during her last few hospitalizations was placed do not resuscitate because of her deteriorating condition.     Per dc summary from last hospitalization patient was treated from 9/23 to 10/1 with zyvox then dc'd.  Blood cultures cleared on 9/26.  And repeats 10/8 are NGTD.    MRI T spine obtained showing:  There is fluid signal within the T9-T10 disc space along with destructive changes of the adjacent vertebral body endplates and associated marrow edema, suggestive of discitis and osteomyelitis.  Small ovoid areas of fluid signal within the soft tissues along the lateral aspects of the T9-T10 disc space bilaterally could reflect abscesses.  The collection on the left measures 2.7 x 1.5 cm and the collection on the right measures 3.8 x 2.4 cm.  Mild degenerative related signal change is seen along the endplates of multiple other thoracic bodies.    CT renal stone study shows:  Destructive changes at the endplates of the T9 and T10 bodies, new when compared to the chest CT from 08/09/2020 and suspicious for discitis.  Mild-to-moderate right hydronephrosis along with multiple calcifications in the vicinity of the distal right ureter, most of which likely reflect calcified phleboliths.  One of these calcifications could represent a ureteral stone.  Small bilateral pleural effusions with adjacent atelectasis/infiltrates.    Patient afebrile and WBC 13s.  NSGY consulted. On Vanc and Zosyn.  ID consulted for abx recs.  Patient co of sever back pain.  She denies dysuria, fever, chills and sweats.  Interval History: NAEON. Afebrile. No leukocytosis. Repeat imaging reviewed.  NSGY does not plan for acute surgical intervention. IR has been consulted and plans for IR biopsy tomorrow. She had a BM yesterday after receiving a laxative. Still SOB, stable. No new complaints.    Review of Systems   Constitutional: Negative for chills, diaphoresis and fever.   Respiratory: Positive for shortness of breath. Negative for cough.    Cardiovascular: Negative for chest pain and palpitations.   Gastrointestinal: Positive for constipation. Negative for nausea.   Genitourinary: Positive for flank pain. Negative for difficulty urinating and dysuria.   Musculoskeletal: Positive for back pain. Negative for arthralgias and neck pain.   Skin: Negative for rash and wound.   Neurological: Negative for weakness and numbness.   Psychiatric/Behavioral: Negative for agitation and confusion. The patient is not nervous/anxious.      Objective:     Vital Signs (Most Recent):  Temp: 97.6 °F (36.4 °C) (10/14/20 1134)  Pulse: 61 (10/14/20 1134)  Resp: 20 (10/14/20 1142)  BP: (!) 159/77 (10/14/20 1134)  SpO2: (!) 94 % (10/14/20 1134) Vital Signs (24h Range):  Temp:  [97.6 °F (36.4 °C)-98.6 °F (37 °C)] 97.6 °F (36.4 °C)  Pulse:  [61-70] 61  Resp:  [16-20] 20  SpO2:  [90 %-94 %] 94 %  BP: (138-170)/(68-80) 159/77     Weight: (!) 137 kg (302 lb)  Body mass index is 43.33 kg/m².    Estimated Creatinine Clearance: 110 mL/min (based on SCr of 0.7 mg/dL).    Physical Exam  Constitutional:       General: She is not in acute distress.     Appearance: She is well-developed. She is not diaphoretic.   HENT:      Head: Normocephalic and atraumatic.   Eyes:      General: No scleral icterus.     Conjunctiva/sclera: Conjunctivae normal.   Cardiovascular:      Rate and Rhythm: Normal rate and regular rhythm.   Pulmonary:      Effort: Pulmonary effort is normal. No respiratory distress.      Comments: O2 via NC  Abdominal:      General: There is no distension.      Palpations: Abdomen is soft.      Tenderness: There is no abdominal  tenderness. There is no guarding.   Musculoskeletal:         General: Tenderness (midline spine, flanks) present.   Skin:     General: Skin is warm and dry.      Findings: Rash (intertrigo) present.   Neurological:      Mental Status: She is alert and oriented to person, place, and time.   Psychiatric:         Mood and Affect: Mood normal.         Behavior: Behavior normal.         Thought Content: Thought content normal.         Significant Labs: All pertinent labs within the past 24 hours have been reviewed.    Significant Imaging: I have reviewed all pertinent imaging results/findings within the past 24 hours.

## 2020-10-14 NOTE — PLAN OF CARE
Problem: Adult Inpatient Plan of Care  Goal: Plan of Care Review  10/14/2020 1742 by Ashley White RN  Outcome: Ongoing, Progressing  10/14/2020 1548 by Ashley White RN  Outcome: Ongoing, Progressing  Goal: Patient-Specific Goal (Individualization)  10/14/2020 1742 by Ashley White RN  Outcome: Ongoing, Progressing  10/14/2020 1548 by Ashley White RN  Outcome: Ongoing, Progressing  Goal: Absence of Hospital-Acquired Illness or Injury  10/14/2020 1742 by Ashley White RN  Outcome: Ongoing, Progressing  10/14/2020 1548 by Ashley White RN  Outcome: Ongoing, Progressing  Goal: Optimal Comfort and Wellbeing  10/14/2020 1742 by Ashley White RN  Outcome: Ongoing, Progressing  10/14/2020 1548 by Ashley White RN  Outcome: Ongoing, Progressing  Goal: Readiness for Transition of Care  10/14/2020 1742 by Ashley White RN  Outcome: Ongoing, Progressing  10/14/2020 1548 by Ashley White RN  Outcome: Ongoing, Progressing  Goal: Rounds/Family Conference  10/14/2020 1742 by Ashley White RN  Outcome: Ongoing, Progressing  10/14/2020 1548 by Ashley White RN  Outcome: Ongoing, Progressing   Patient remains free from injury or fall. K riders X4 given.Blood glucose monitored. No neuro changes noted or reported. Plan= will be NPO tonight pre procedure. Will continue to monitor

## 2020-10-14 NOTE — ASSESSMENT & PLAN NOTE
Martina Betancourt is a 70 y.o. female with recent MRSA bacteremia who presents with new onselt back pain, found to haveT9-T10 discitis with bone erosion and possible adjacent soft tissue abscesses.     Neurologically intact on exam.     - All labs and imaging personally reviewed  - q4 neuro checks  - MRI w wo contrast of thoracic and lumbar spine shows discitis/osteomyelitis at T9-T10 with T8-T10 phlegmon. There is a bilateral spondylolysis at L5 and central canal narrowing from L3-L5.   - CT thoracic/lumbar spine shows T9-T10 discitis/osteomyelitis. No retropulsion, no focal kyphosis.   - No emergent neurosurgical intervention indicated at this time. Plan for IR biopsy tomorrow, 10/15.   - ID: Recent hospitalization for MRSA bacteremia, likely infectious cause. Blood cultures 10/8 NGTD. WBC wnl, afebrile.    - ID recommending vanc + rocephin anticipated for 6-8 weeks.   - Back Pain: Per primary team. Not well managed on current regimen of Norco 10 mg PRN. Dilaudid ordered PRN but pt not taking.   - Recommend trial of muscle relaxer and gabapentin for improved multimodal pain control  - Constipation: Recommend escalate bowel regimen for c/o constipation   - Bilateral pleural effusions: seen on CT lumbar spine. On 4L NC. ID recommending pulm consult for diagnostic/therapeutic thoracentesis.  - Please notify neurosx for any changes in neuro exam

## 2020-10-14 NOTE — PROGRESS NOTES
Hospital Medicine  Progress note    Team: [facility] [place] [facility] A Tha Villarreal MD  Admit Date: 10/8/2020  BRIT 10/16/2020  Length of Stay:  LOS: 6 days   Code status: Full Code    Principal Problem:  Discitis    HPI / Hospital Course     Interval hx:  10/14 Biopsy planned for Thursday.    10/13 Zosyn stopped, rocephin started. Neurosurgery consult,II spoke to them, final recs pending,. WBC -8.2, afebrile, BC- NGTD.  MRI and CT scan of back completed, some nerve root compression noted, await NSG input.    10/09 await ID consult. VSS, WBC is 13.2. BC-NGTD    ROS     Respiratory: neg for cough neg for shortness of breath  Cardiovascular: neg for chest pain neg for palpitations  Gastrointestinal: neg for nausea neg for vomiting, neg for abdominal pain neg for diarrhea neg for constipation   Behavioral/Psych: neg for depression neg for anxiety    PEx  Temp:  [97.5 °F (36.4 °C)-98.6 °F (37 °C)]   Pulse:  [62-70]   Resp:  [16-20]   BP: (138-175)/(68-80)   SpO2:  [90 %-94 %]   No intake or output data in the 24 hours ending 10/14/20 0846    General Appearance: no acute distress   Heart: regular rate and rhythm  Respiratory: Normal respiratory effort, no crackles   Abdomen: Soft, non-tender; bowel sounds active  Skin: intact.Skin intact  Neurologic:  No focal numbness or weakness  Mental status: Alert, oriented x 4, affect appropriate     Recent Labs   Lab 10/12/20  0522 10/13/20  0447 10/14/20  0501   WBC 8.25 8.32 8.52   HGB 10.0* 10.2* 10.1*   HCT 34.8* 35.7* 34.4*   * 441* 468*     Recent Labs   Lab 10/11/20  0541 10/12/20  0522 10/13/20  0450    140 141   K 2.9* 2.8* 2.8*   CL 96 95 96   CO2 36* 36* 36*   BUN 8 7* 8   CREATININE 0.7 0.7 0.7   GLU 87 87 85   CALCIUM 9.7 9.5 9.6     Recent Labs   Lab 10/11/20  0541 10/12/20  0522 10/13/20  0450   ALKPHOS 89 84 86   ALT 7* 7* 7*   AST 12 9* 11   ALBUMIN 1.9* 1.9* 2.0*   PROT 6.7 6.6 6.7   BILITOT 0.2 0.2 0.2        Recent Labs   Lab 10/11/20  1153   POCTGLUCOSE 119*        Scheduled Meds:   aspirin  81 mg Oral Daily    bisacodyL  5 mg Oral Daily    cefTRIAXone (ROCEPHIN) IVPB  2 g Intravenous Q24H    donepeziL  10 mg Oral QHS    DULoxetine  60 mg Oral Daily    enoxaparin  40 mg Subcutaneous Q24H    furosemide  40 mg Oral Daily    isosorbide mononitrate  60 mg Oral Daily    lisinopriL  2.5 mg Oral Daily    metoprolol succinate  50 mg Oral Daily    miconazole nitrate 2%   Topical (Top) BID    pantoprazole  40 mg Oral Before breakfast    pravastatin  40 mg Oral QHS    vancomycin (VANCOCIN) IVPB  1,750 mg Intravenous Q24H     Continuous Infusions:  As Needed:  albuterol-ipratropium, bisacodyL, glucose, glucose, HYDROcodone-acetaminophen, HYDROmorphone, melatonin, ondansetron, prochlorperazine, sodium chloride 0.9%, Pharmacy to dose Vancomycin consult **AND** vancomycin - pharmacy to dose    ** update problem list    Active Hospital Problems    Diagnosis  POA    *Discitis [M46.40]  Yes    Back pain [M54.9]  Yes    Bacteremia due to Staphylococcus aureus [R78.81, B95.61]  No    Hydronephrosis [N13.30]  Yes    HTN (hypertension) [I10]  Unknown    CAD (coronary artery disease) [I25.10]  Unknown    MRSA bacteremia [R78.81, B95.62]  Yes    Dementia without behavioral disturbance [F03.90]  Yes      Resolved Hospital Problems   No resolved problems to display.       Assessment and Plan  / Problems managed today    MRSA Bacteremia  -Pt. With recent hx of MRSA bacteremia presents with back pain. MRI shows  Evidence of discitis/osteomyelitis at T9-T10 with possible small associated abscesses within the adjacent soft tissues  -Neurosurgery consulted regarding need for intervention  -Continue broad spectrum abx with vanc/zosyn therapy for now, but strong suspicion for MRSA as cause.  -Blood cultures ordered as they were not drawn at OSH prior to transfer (note that abx have already been given)  -Consult ID for further recommendations regarding need for  zosyn     HTN  -Continue home BP meds     Dementia without behavioral disturbance  -Continue donepezil PRN  -Delirium precautions     CAD  -Pt. With recent NSTEMI during admission last month. Was deemed Poor cath lab candidate - dementia/DNR  -Continue GDMT with ASA, b-blocker, ACEi, and statin. Will hold plavix for now as pt. Does not have an absolute indication until neurosurgery evaluates need for intervention     DVT PPx: Lovenox           :    Goals of Care:  Return to prior functional status    Discharge plan:    Time (minutes) spent in care of the patient (Greater than 1/2 spent in direct face-to-face contact)  35 minutes    Tha Villarreal MD

## 2020-10-14 NOTE — SUBJECTIVE & OBJECTIVE
Interval History: NAEON. Afebrile. No leukocytosis. Repeat imaging reviewed. NSGY does not plan for acute surgical intervention. IR has been consulted and plans for IR biopsy tomorrow. She had a BM yesterday after receiving a laxative. Still SOB, stable. No new complaints.    Review of Systems   Constitutional: Negative for chills, diaphoresis and fever.   Respiratory: Positive for shortness of breath. Negative for cough.    Cardiovascular: Negative for chest pain and palpitations.   Gastrointestinal: Positive for constipation. Negative for nausea.   Genitourinary: Positive for flank pain. Negative for difficulty urinating and dysuria.   Musculoskeletal: Positive for back pain. Negative for arthralgias and neck pain.   Skin: Negative for rash and wound.   Neurological: Negative for weakness and numbness.   Psychiatric/Behavioral: Negative for agitation and confusion. The patient is not nervous/anxious.      Objective:     Vital Signs (Most Recent):  Temp: 97.6 °F (36.4 °C) (10/14/20 1134)  Pulse: 61 (10/14/20 1134)  Resp: 20 (10/14/20 1142)  BP: (!) 159/77 (10/14/20 1134)  SpO2: (!) 94 % (10/14/20 1134) Vital Signs (24h Range):  Temp:  [97.6 °F (36.4 °C)-98.6 °F (37 °C)] 97.6 °F (36.4 °C)  Pulse:  [61-70] 61  Resp:  [16-20] 20  SpO2:  [90 %-94 %] 94 %  BP: (138-170)/(68-80) 159/77     Weight: (!) 137 kg (302 lb)  Body mass index is 43.33 kg/m².    Estimated Creatinine Clearance: 110 mL/min (based on SCr of 0.7 mg/dL).    Physical Exam  Constitutional:       General: She is not in acute distress.     Appearance: She is well-developed. She is not diaphoretic.   HENT:      Head: Normocephalic and atraumatic.   Eyes:      General: No scleral icterus.     Conjunctiva/sclera: Conjunctivae normal.   Cardiovascular:      Rate and Rhythm: Normal rate and regular rhythm.   Pulmonary:      Effort: Pulmonary effort is normal. No respiratory distress.      Comments: O2 via NC  Abdominal:      General: There is no distension.       Palpations: Abdomen is soft.      Tenderness: There is no abdominal tenderness. There is no guarding.   Musculoskeletal:         General: Tenderness (midline spine, flanks) present.   Skin:     General: Skin is warm and dry.      Findings: Rash (intertrigo) present.   Neurological:      Mental Status: She is alert and oriented to person, place, and time.   Psychiatric:         Mood and Affect: Mood normal.         Behavior: Behavior normal.         Thought Content: Thought content normal.         Significant Labs: All pertinent labs within the past 24 hours have been reviewed.    Significant Imaging: I have reviewed all pertinent imaging results/findings within the past 24 hours.

## 2020-10-15 LAB
ALBUMIN SERPL BCP-MCNC: 2.1 G/DL (ref 3.5–5.2)
ALP SERPL-CCNC: 92 U/L (ref 55–135)
ALT SERPL W/O P-5'-P-CCNC: 6 U/L (ref 10–44)
ANION GAP SERPL CALC-SCNC: 12 MMOL/L (ref 8–16)
ANION GAP SERPL CALC-SCNC: 7 MMOL/L (ref 8–16)
AST SERPL-CCNC: 9 U/L (ref 10–40)
BASOPHILS # BLD AUTO: 0.02 K/UL (ref 0–0.2)
BASOPHILS NFR BLD: 0.2 % (ref 0–1.9)
BILIRUB SERPL-MCNC: 0.2 MG/DL (ref 0.1–1)
BUN SERPL-MCNC: 7 MG/DL (ref 8–23)
BUN SERPL-MCNC: 7 MG/DL (ref 8–23)
CALCIUM SERPL-MCNC: 9.7 MG/DL (ref 8.7–10.5)
CALCIUM SERPL-MCNC: 9.8 MG/DL (ref 8.7–10.5)
CHLORIDE SERPL-SCNC: 93 MMOL/L (ref 95–110)
CHLORIDE SERPL-SCNC: 95 MMOL/L (ref 95–110)
CO2 SERPL-SCNC: 35 MMOL/L (ref 23–29)
CO2 SERPL-SCNC: 38 MMOL/L (ref 23–29)
CREAT SERPL-MCNC: 0.7 MG/DL (ref 0.5–1.4)
CREAT SERPL-MCNC: 0.8 MG/DL (ref 0.5–1.4)
DIFFERENTIAL METHOD: ABNORMAL
EOSINOPHIL # BLD AUTO: 0.1 K/UL (ref 0–0.5)
EOSINOPHIL NFR BLD: 0.7 % (ref 0–8)
ERYTHROCYTE [DISTWIDTH] IN BLOOD BY AUTOMATED COUNT: 15.5 % (ref 11.5–14.5)
EST. GFR  (AFRICAN AMERICAN): >60 ML/MIN/1.73 M^2
EST. GFR  (AFRICAN AMERICAN): >60 ML/MIN/1.73 M^2
EST. GFR  (NON AFRICAN AMERICAN): >60 ML/MIN/1.73 M^2
EST. GFR  (NON AFRICAN AMERICAN): >60 ML/MIN/1.73 M^2
GLUCOSE SERPL-MCNC: 129 MG/DL (ref 70–110)
GLUCOSE SERPL-MCNC: 99 MG/DL (ref 70–110)
HCT VFR BLD AUTO: 35.6 % (ref 37–48.5)
HGB BLD-MCNC: 10.4 G/DL (ref 12–16)
IMM GRANULOCYTES # BLD AUTO: 0.02 K/UL (ref 0–0.04)
IMM GRANULOCYTES NFR BLD AUTO: 0.2 % (ref 0–0.5)
INR PPP: 1.1 (ref 0.8–1.2)
LYMPHOCYTES # BLD AUTO: 2.2 K/UL (ref 1–4.8)
LYMPHOCYTES NFR BLD: 22 % (ref 18–48)
MCH RBC QN AUTO: 27 PG (ref 27–31)
MCHC RBC AUTO-ENTMCNC: 29.2 G/DL (ref 32–36)
MCV RBC AUTO: 93 FL (ref 82–98)
MONOCYTES # BLD AUTO: 1.4 K/UL (ref 0.3–1)
MONOCYTES NFR BLD: 13.3 % (ref 4–15)
NEUTROPHILS # BLD AUTO: 6.4 K/UL (ref 1.8–7.7)
NEUTROPHILS NFR BLD: 63.6 % (ref 38–73)
NRBC BLD-RTO: 0 /100 WBC
PLATELET # BLD AUTO: 402 K/UL (ref 150–350)
PMV BLD AUTO: 9.5 FL (ref 9.2–12.9)
POTASSIUM SERPL-SCNC: 2.9 MMOL/L (ref 3.5–5.1)
POTASSIUM SERPL-SCNC: 3 MMOL/L (ref 3.5–5.1)
PROT SERPL-MCNC: 7.1 G/DL (ref 6–8.4)
PROTHROMBIN TIME: 11.7 SEC (ref 9–12.5)
RBC # BLD AUTO: 3.85 M/UL (ref 4–5.4)
SODIUM SERPL-SCNC: 140 MMOL/L (ref 136–145)
SODIUM SERPL-SCNC: 140 MMOL/L (ref 136–145)
VANCOMYCIN TROUGH SERPL-MCNC: 15.6 UG/ML (ref 10–22)
WBC # BLD AUTO: 10.12 K/UL (ref 3.9–12.7)

## 2020-10-15 PROCEDURE — 63600175 PHARM REV CODE 636 W HCPCS: Performed by: STUDENT IN AN ORGANIZED HEALTH CARE EDUCATION/TRAINING PROGRAM

## 2020-10-15 PROCEDURE — 80053 COMPREHEN METABOLIC PANEL: CPT

## 2020-10-15 PROCEDURE — 99233 PR SUBSEQUENT HOSPITAL CARE,LEVL III: ICD-10-PCS | Mod: ,,, | Performed by: PHYSICIAN ASSISTANT

## 2020-10-15 PROCEDURE — 63600175 PHARM REV CODE 636 W HCPCS: Performed by: INTERNAL MEDICINE

## 2020-10-15 PROCEDURE — 25000003 PHARM REV CODE 250: Performed by: HOSPITALIST

## 2020-10-15 PROCEDURE — 99233 PR SUBSEQUENT HOSPITAL CARE,LEVL III: ICD-10-PCS | Mod: ,,, | Performed by: STUDENT IN AN ORGANIZED HEALTH CARE EDUCATION/TRAINING PROGRAM

## 2020-10-15 PROCEDURE — 85025 COMPLETE CBC W/AUTO DIFF WBC: CPT

## 2020-10-15 PROCEDURE — 25000003 PHARM REV CODE 250: Performed by: STUDENT IN AN ORGANIZED HEALTH CARE EDUCATION/TRAINING PROGRAM

## 2020-10-15 PROCEDURE — 25000003 PHARM REV CODE 250: Performed by: INTERNAL MEDICINE

## 2020-10-15 PROCEDURE — 99233 SBSQ HOSP IP/OBS HIGH 50: CPT | Mod: ,,, | Performed by: PHYSICIAN ASSISTANT

## 2020-10-15 PROCEDURE — 36415 COLL VENOUS BLD VENIPUNCTURE: CPT

## 2020-10-15 PROCEDURE — 63600175 PHARM REV CODE 636 W HCPCS: Performed by: PHYSICIAN ASSISTANT

## 2020-10-15 PROCEDURE — 11000001 HC ACUTE MED/SURG PRIVATE ROOM

## 2020-10-15 PROCEDURE — 85610 PROTHROMBIN TIME: CPT

## 2020-10-15 PROCEDURE — 80048 BASIC METABOLIC PNL TOTAL CA: CPT

## 2020-10-15 PROCEDURE — 99233 SBSQ HOSP IP/OBS HIGH 50: CPT | Mod: ,,, | Performed by: STUDENT IN AN ORGANIZED HEALTH CARE EDUCATION/TRAINING PROGRAM

## 2020-10-15 PROCEDURE — 80202 ASSAY OF VANCOMYCIN: CPT

## 2020-10-15 RX ORDER — POTASSIUM CHLORIDE 7.45 MG/ML
10 INJECTION INTRAVENOUS
Status: DISPENSED | OUTPATIENT
Start: 2020-10-15 | End: 2020-10-15

## 2020-10-15 RX ADMIN — POTASSIUM CHLORIDE 10 MEQ: 7.46 INJECTION, SOLUTION INTRAVENOUS at 10:10

## 2020-10-15 RX ADMIN — METOPROLOL SUCCINATE 50 MG: 50 TABLET, EXTENDED RELEASE ORAL at 11:10

## 2020-10-15 RX ADMIN — POTASSIUM CHLORIDE 10 MEQ: 7.46 INJECTION, SOLUTION INTRAVENOUS at 03:10

## 2020-10-15 RX ADMIN — VANCOMYCIN HYDROCHLORIDE 1750 MG: 10 INJECTION, POWDER, LYOPHILIZED, FOR SOLUTION INTRAVENOUS at 06:10

## 2020-10-15 RX ADMIN — POTASSIUM CHLORIDE 10 MEQ: 7.46 INJECTION, SOLUTION INTRAVENOUS at 09:10

## 2020-10-15 RX ADMIN — HYDROCODONE BITARTRATE AND ACETAMINOPHEN 1 TABLET: 10; 325 TABLET ORAL at 11:10

## 2020-10-15 RX ADMIN — BISACODYL 5 MG: 5 TABLET, COATED ORAL at 11:10

## 2020-10-15 RX ADMIN — ASPIRIN 81 MG: 81 TABLET, COATED ORAL at 11:10

## 2020-10-15 RX ADMIN — DULOXETINE HYDROCHLORIDE 60 MG: 30 CAPSULE, DELAYED RELEASE ORAL at 11:10

## 2020-10-15 RX ADMIN — ISOSORBIDE MONONITRATE 60 MG: 30 TABLET, EXTENDED RELEASE ORAL at 11:10

## 2020-10-15 RX ADMIN — DONEPEZIL HYDROCHLORIDE 10 MG: 10 TABLET ORAL at 09:10

## 2020-10-15 RX ADMIN — LISINOPRIL 2.5 MG: 2.5 TABLET ORAL at 11:10

## 2020-10-15 RX ADMIN — POTASSIUM CHLORIDE 10 MEQ: 7.46 INJECTION, SOLUTION INTRAVENOUS at 01:10

## 2020-10-15 RX ADMIN — PRAVASTATIN SODIUM 40 MG: 40 TABLET ORAL at 09:10

## 2020-10-15 RX ADMIN — POTASSIUM CHLORIDE 10 MEQ: 7.46 INJECTION, SOLUTION INTRAVENOUS at 06:10

## 2020-10-15 RX ADMIN — MICONAZOLE NITRATE: 20 OINTMENT TOPICAL at 09:10

## 2020-10-15 RX ADMIN — MELATONIN TAB 3 MG 6 MG: 3 TAB at 09:10

## 2020-10-15 RX ADMIN — POTASSIUM BICARBONATE 50 MEQ: 978 TABLET, EFFERVESCENT ORAL at 11:10

## 2020-10-15 RX ADMIN — CEFTRIAXONE SODIUM 2 G: 2 INJECTION, SOLUTION INTRAVENOUS at 09:10

## 2020-10-15 RX ADMIN — HYDROCODONE BITARTRATE AND ACETAMINOPHEN 1 TABLET: 10; 325 TABLET ORAL at 06:10

## 2020-10-15 NOTE — PLAN OF CARE
POC updated & reviewed with patient, verbalized understanding. Questions regarding POC were encouraged & addressed with patient. VSS, see flow-sheets. Patient is AAO x4 at this time.  Fall/safety precautions maintained, no signs of injury noted during shift. Contact precautions maintained. Patient is to have an IR aspiration 10/15/20. NPO since 10/15/20  @ 0000. Patient was repositioned for comfort, bed locked in low position, side rails up x3, bed alarm set, and call light within reach. Patient was instructed to call staff for mobility, verbalized understanding. WCTM.       Problem: Adult Inpatient Plan of Care  Goal: Plan of Care Review  Outcome: Ongoing, Progressing  Goal: Absence of Hospital-Acquired Illness or Injury  Outcome: Ongoing, Progressing  Goal: Optimal Comfort and Wellbeing  Outcome: Ongoing, Progressing     Problem: Infection  Goal: Infection Symptom Resolution  Outcome: Ongoing, Progressing     Problem: Skin Injury Risk Increased  Goal: Skin Health and Integrity  Outcome: Ongoing, Progressing

## 2020-10-15 NOTE — SUBJECTIVE & OBJECTIVE
Interval History: NAEON. Exam stable. Plan for IR biopsy today T9-T10 with possible abscess drainage.     Medications:  Continuous Infusions:  Scheduled Meds:   aspirin  81 mg Oral Daily    bisacodyL  5 mg Oral Daily    cefTRIAXone (ROCEPHIN) IVPB  2 g Intravenous Q24H    donepeziL  10 mg Oral QHS    DULoxetine  60 mg Oral Daily    enoxaparin  40 mg Subcutaneous Q24H    isosorbide mononitrate  60 mg Oral Daily    lisinopriL  2.5 mg Oral Daily    metoprolol succinate  50 mg Oral Daily    miconazole nitrate 2%   Topical (Top) BID    pantoprazole  40 mg Oral Before breakfast    potassium bicarbonate  50 mEq Oral Once    potassium chloride in water  10 mEq Intravenous Q1H    pravastatin  40 mg Oral QHS    vancomycin (VANCOCIN) IVPB  1,750 mg Intravenous Q24H     PRN Meds:albuterol-ipratropium, bisacodyL, glucose, glucose, HYDROcodone-acetaminophen, HYDROmorphone, melatonin, ondansetron, prochlorperazine, sodium chloride 0.9%, Pharmacy to dose Vancomycin consult **AND** vancomycin - pharmacy to dose     Review of Systems  Objective:     Weight: (!) 137 kg (302 lb)  Body mass index is 43.33 kg/m².  Vital Signs (Most Recent):  Temp: 98 °F (36.7 °C) (10/15/20 0837)  Pulse: 62 (10/15/20 0837)  Resp: 10 (10/15/20 0837)  BP: (!) 148/66 (10/15/20 0837)  SpO2: 95 % (10/15/20 0837) Vital Signs (24h Range):  Temp:  [97 °F (36.1 °C)-98.4 °F (36.9 °C)] 98 °F (36.7 °C)  Pulse:  [61-68] 62  Resp:  [10-21] 10  SpO2:  [91 %-95 %] 95 %  BP: (146-161)/(62-78) 148/66     Date 10/15/20 0700 - 10/16/20 0659   Shift 5058-2216 3860-0319 8096-8994 24 Hour Total   INTAKE   Shift Total(mL/kg)       OUTPUT   Urine(mL/kg/hr) 1200   1200   Stool 1   1   Shift Total(mL/kg) 1201(8.8)   1201(8.8)   Weight (kg) 137 137 137 137                   Female External Urinary Catheter 10/07/20 1700 (Active)   Skin reddened;breakdown 10/14/20 2000   Tolerance no signs/symptoms of discomfort 10/14/20 2000   Suction Continuous suction at 70 mmHg  10/14/20 2000   Date of last wick change 10/13/20 10/14/20 2000   Time of last wick change 0449 10/08/20 2200   Output (mL) 350 mL 10/10/20 0600       Neurosurgery Physical Exam  General: well developed, morbidly obese, no distress.   Head: normocephalic, atraumatic. Nasal cannula in place.  Neck: No tracheal deviation. Full ROM.   Neurologic: Alert and oriented. Thought content appropriate.  GCS: Motor: 6/Verbal: 5/Eyes: 4 GCS Total: 15  Mental Status: Awake, Alert, Oriented x 4  Language: No aphasia  Speech: No dysarthria  Cranial nerves: face symmetric, tongue midline, CN II-XII grossly intact.   Eyes: pupils equal, round, reactive to light with accomodation, EOMI.   Ears: No drainage.   Pulmonary: normal respirations, no signs of respiratory distress     Sensory: baseline neuropathy of right plantar foot, otherwise intact to light touch throughout  Motor Strength: Moves all extremities spontaneously with good tone.  Full strength upper and lower extremities. No abnormal movements seen. Range of motion limited 2/2 body habitus.      Strength   Deltoids Triceps Biceps Wrist Extension Wrist Flexion Hand    Upper: R 5/5 5/5 5/5 5/5 5/5 5/5     L 5/5 5/5 5/5 5/5 5/5 5/5       Iliopsoas Quadriceps Knee  Flexion Tibialis  anterior Gastro- cnemius EHL   Lower: R 5/5 5/5 5/5 5/5 5/5 5/5     L 5/5 5/5 5/5 5/5 5/5 5/5      Stein: absent  Clonus: absent  Skin: Skin is warm, dry and intact    Significant Labs:  Recent Labs   Lab 10/14/20  0502 10/15/20  0333   GLU 89 99    140   K 2.7* 2.9*   CL 94* 93*   CO2 37* 35*   BUN 8 7*   CREATININE 0.7 0.8   CALCIUM 9.9 9.8     Recent Labs   Lab 10/14/20  0501 10/15/20  0333   WBC 8.52 10.12   HGB 10.1* 10.4*   HCT 34.4* 35.6*   * 402*     Recent Labs   Lab 10/15/20  0920   INR 1.1     Microbiology Results (last 7 days)     Procedure Component Value Units Date/Time    Blood culture [[id number]] Collected: 10/08/20 2345    Order Status: Completed Specimen: Blood  Updated: 10/14/20 0612     Blood Culture, Routine No growth after 5 days.    Blood culture [[id number]] Collected: 10/08/20 2344    Order Status: Completed Specimen: Blood Updated: 10/14/20 0612     Blood Culture, Routine No growth after 5 days.        All pertinent labs from the last 24 hours have been reviewed.    Significant Diagnostics:  No results found in the last 24 hours.

## 2020-10-15 NOTE — PROGRESS NOTES
Hospital Medicine  Progress note    [facility]: [facility] [place] [facility] DEEPAK Coronado MD  Admit Date: 10/8/2020  BRIT 10/16/2020  Length of Stay:  LOS: 7 days   Code status: Full Code    Principal Problem:  Discitis    HPI / Hospital Course     Interval hx:  Unable to do IR drainage of abscesses today, will plan again for tomorrow. NPO at midnight. Remains on vancomycin and ceftriaxone.     10/14 Biopsy planned for Thursday.    10/13 Zosyn stopped, rocephin started. Neurosurgery consult,II spoke to them, final recs pending,. WBC -8.2, afebrile, BC- NGTD.  MRI and CT scan of back completed, some nerve root compression noted, await NSG input.    10/09 await ID consult. VSS, WBC is 13.2. BC-NGTD    ROS     Respiratory: neg for cough neg for shortness of breath  Cardiovascular: neg for chest pain neg for palpitations  Gastrointestinal: neg for nausea neg for vomiting, neg for abdominal pain neg for diarrhea neg for constipation   Behavioral/Psych: neg for depression neg for anxiety    PEx  Temp:  [97 °F (36.1 °C)-98.4 °F (36.9 °C)]   Pulse:  [62-68]   Resp:  [10-21]   BP: (148-161)/(66-78)   SpO2:  [92 %-95 %]     Intake/Output Summary (Last 24 hours) at 10/15/2020 1600  Last data filed at 10/15/2020 0840  Gross per 24 hour   Intake --   Output 1201 ml   Net -1201 ml       General Appearance: no acute distress   Heart: regular rate and rhythm  Respiratory: Normal respiratory effort, no crackles   Abdomen: Soft, non-tender; bowel sounds active  Skin: intact.Skin intact  Neurologic:  No focal numbness or weakness  Mental status: Alert, oriented x 4, affect appropriate     Recent Labs   Lab 10/13/20  0447 10/14/20  0501 10/15/20  0333   WBC 8.32 8.52 10.12   HGB 10.2* 10.1* 10.4*   HCT 35.7* 34.4* 35.6*   * 468* 402*     Recent Labs   Lab 10/13/20  0450 10/14/20  0502 10/15/20  0333    140 140   K 2.8* 2.7* 2.9*   CL 96 94* 93*   CO2 36* 37* 35*   BUN 8 8 7*   CREATININE 0.7 0.7 0.8   GLU 85 89 99   CALCIUM 9.6 9.9 9.8      Recent Labs   Lab 10/13/20  0450 10/14/20  0502 10/15/20  0333 10/15/20  0920   ALKPHOS 86 88 92  --    ALT 7* 5* 6*  --    AST 11 9* 9*  --    ALBUMIN 2.0* 1.9* 2.1*  --    PROT 6.7 7.0 7.1  --    BILITOT 0.2 0.2 0.2  --    INR  --   --   --  1.1        Recent Labs   Lab 10/11/20  1153 10/14/20  1148 10/14/20  1634   POCTGLUCOSE 119* 99 104       Scheduled Meds:   aspirin  81 mg Oral Daily    bisacodyL  5 mg Oral Daily    cefTRIAXone (ROCEPHIN) IVPB  2 g Intravenous Q24H    donepeziL  10 mg Oral QHS    DULoxetine  60 mg Oral Daily    enoxaparin  40 mg Subcutaneous Q24H    isosorbide mononitrate  60 mg Oral Daily    lisinopriL  2.5 mg Oral Daily    metoprolol succinate  50 mg Oral Daily    miconazole nitrate 2%   Topical (Top) BID    pantoprazole  40 mg Oral Before breakfast    pravastatin  40 mg Oral QHS    vancomycin (VANCOCIN) IVPB  1,750 mg Intravenous Q24H     Continuous Infusions:  As Needed:  albuterol-ipratropium, bisacodyL, glucose, glucose, HYDROcodone-acetaminophen, HYDROmorphone, melatonin, ondansetron, prochlorperazine, sodium chloride 0.9%, Pharmacy to dose Vancomycin consult **AND** vancomycin - pharmacy to dose    ** update problem list    Active Hospital Problems    Diagnosis  POA    *Discitis [M46.40]  Yes    Back pain [M54.9]  Yes    Bacteremia due to Staphylococcus aureus [R78.81, B95.61]  No    Hydronephrosis [N13.30]  Yes    HTN (hypertension) [I10]  Unknown    CAD (coronary artery disease) [I25.10]  Unknown    MRSA bacteremia [R78.81, B95.62]  Yes    Dementia without behavioral disturbance [F03.90]  Yes      Resolved Hospital Problems   No resolved problems to display.       Assessment and Plan  / Problems managed today    MRSA Bacteremia  Discitis  -Pt. With recent hx of MRSA bacteremia presents with back pain. MRI shows  Evidence of discitis/osteomyelitis at T9-T10 with possible small associated abscesses within the adjacent soft tissues  -Neurosurgery consulted  regarding need for intervention  -Continue broad spectrum abx with vanc/zosyn therapy for now, but strong suspicion for MRSA as cause.  -Blood cultures ordered as they were not drawn at OSH prior to transfer (note that abx have already been given)  -Consult ID for further recommendations regarding need for zosyn  -Unable to do IR drainage of abscesses today, will plan again for tomorrow. NPO at midnight. Remains on vancomycin and ceftriaxone.      HTN  -Continue home BP meds     Dementia without behavioral disturbance  -Continue donepezil PRN  -Delirium precautions     CAD  -Pt. With recent NSTEMI during admission last month. Was deemed Poor cath lab candidate - dementia/DNR  -Continue GDMT with ASA, b-blocker, ACEi, and statin. Will hold plavix for now as pt. Does not have an absolute indication until neurosurgery evaluates need for intervention     DVT PPx: Lovenox        Goals of Care:  Return to prior functional status    Discharge plan:    Time (minutes) spent in care of the patient (Greater than 1/2 spent in direct face-to-face contact)  35 minutes    Shama Coronado MD

## 2020-10-15 NOTE — PLAN OF CARE
Interval hx:  10/14 Biopsy planned for Thursday.       10/15/20 1536   Discharge Reassessment   Assessment Type Discharge Planning Reassessment   Provided patient/caregiver education on the expected discharge date and the discharge plan Yes   Do you have any problems affording any of your prescribed medications? TBD   Discharge Plan A Skilled Nursing Facility   Discharge Plan B Home with family;Home Health   DME Needed Upon Discharge  other (see comments)  (tbd)   Patient choice form signed by patient/caregiver N/A   Anticipated Discharge Disposition SNF   Can the patient/caregiver answer the patient profile reliably? Yes, cognitively intact   How does the patient rate their overall health at the present time? Fair   Describe the patient's ability to walk at the present time. Major restrictions/daily assistance from another person   How often would a person be available to care for the patient? Whenever needed   Number of comorbid conditions (as recorded on the chart) Four   During the past month, has the patient often been bothered by feeling down, depressed or hopeless? No   During the past month, has the patient often been bothered by little interest or pleasure in doing things? No   Post-Acute Status   Post-Acute Authorization Placement   Post-Acute Placement Status Awaiting Internal Medical Clearance   Discharge Delays None known at this time     Marilu Tierney RN  Case Management  Ext: [phone]

## 2020-10-15 NOTE — PROGRESS NOTES
[facility]  Neurosurgery  Progress Note    Subjective:     History of Present Illness: Patient is a 70 year old F with a PMHx of Alzheimer's dz, HTN, HLD, CAD, YOVANI, and morbid obesity who was recently hospitalized x2 for MRSA bacteremia complicated by pneumonia and possible UTI, treated with linozolid x8 days and cipro and discharged 10/1. She was admitted to [facility] 10/8 from ED, presenting for excruciating flank pain x 24 hours. Ct renal stone study negative, but revealed T9-T10 discitis. Non-contrast MRI T spine prior to transfer revealed T9-T10 discitis/osteomyelitis with vertebral bone erosion and possible adjacent soft tissue abscesses. Patient transfer 10/8, began IV vanc 10/8 and IV rocephin 10/9. On neurosx consult exam, patient is awake alert and cooperative, AOx4. 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 Patient reports nonambulatory at baseline, denies changes to her activity other than pain limitation. Neurologically intact on PEx, no evidence of motor/sensory deficit.           Post-Op Info:  * No surgery found *         Interval History: NAEON. Exam stable. Plan for IR biopsy today T9-T10 with possible abscess drainage.     Medications:  Continuous Infusions:  Scheduled Meds:   aspirin  81 mg Oral Daily    bisacodyL  5 mg Oral Daily    cefTRIAXone (ROCEPHIN) IVPB  2 g Intravenous Q24H    donepeziL  10 mg Oral QHS    DULoxetine  60 mg Oral Daily    enoxaparin  40 mg Subcutaneous Q24H    isosorbide mononitrate  60 mg Oral Daily    lisinopriL  2.5 mg Oral Daily    metoprolol succinate  50 mg Oral Daily    miconazole nitrate 2%   Topical (Top) BID    pantoprazole  40 mg Oral Before breakfast    potassium bicarbonate  50 mEq  Oral Once    potassium chloride in water  10 mEq Intravenous Q1H    pravastatin  40 mg Oral QHS    vancomycin (VANCOCIN) IVPB  1,750 mg Intravenous Q24H     PRN Meds:albuterol-ipratropium, bisacodyL, glucose, glucose, HYDROcodone-acetaminophen, HYDROmorphone, melatonin, ondansetron, prochlorperazine, sodium chloride 0.9%, Pharmacy to dose Vancomycin consult **AND** vancomycin - pharmacy to dose     Review of Systems  Objective:     Weight: (!) 137 kg (302 lb)  Body mass index is 43.33 kg/m².  Vital Signs (Most Recent):  Temp: 98 °F (36.7 °C) (10/15/20 0837)  Pulse: 62 (10/15/20 0837)  Resp: 10 (10/15/20 0837)  BP: (!) 148/66 (10/15/20 0837)  SpO2: 95 % (10/15/20 0837) Vital Signs (24h Range):  Temp:  [97 °F (36.1 °C)-98.4 °F (36.9 °C)] 98 °F (36.7 °C)  Pulse:  [61-68] 62  Resp:  [10-21] 10  SpO2:  [91 %-95 %] 95 %  BP: (146-161)/(62-78) 148/66     Date 10/15/20 0700 - 10/16/20 0659   Shift 3033-5835 9419-4246 6757-8309 24 Hour Total   INTAKE   Shift Total(mL/kg)       OUTPUT   Urine(mL/kg/hr) 1200   1200   Stool 1   1   Shift Total(mL/kg) 1201(8.8)   1201(8.8)   Weight (kg) 137 137 137 137                   Female External Urinary Catheter 10/07/20 1700 (Active)   Skin reddened;breakdown 10/14/20 2000   Tolerance no signs/symptoms of discomfort 10/14/20 2000   Suction Continuous suction at 70 mmHg 10/14/20 2000   Date of last wick change 10/13/20 10/14/20 2000   Time of last wick change 0449 10/08/20 2200   Output (mL) 350 mL 10/10/20 0600       Neurosurgery Physical Exam  General: well developed, morbidly obese, no distress.   Head: normocephalic, atraumatic. Nasal cannula in place.  Neck: No tracheal deviation. Full ROM.   Neurologic: Alert and oriented. Thought content appropriate.  GCS: Motor: 6/Verbal: 5/Eyes: 4 GCS Total: 15  Mental Status: Awake, Alert, Oriented x 4  Language: No aphasia  Speech: No dysarthria  Cranial nerves: face symmetric, tongue midline, CN II-XII grossly intact.   Eyes: pupils equal,  round, reactive to light with accomodation, EOMI.   Ears: No drainage.   Pulmonary: normal respirations, no signs of respiratory distress     Sensory: baseline neuropathy of right plantar foot, otherwise intact to light touch throughout  Motor Strength: Moves all extremities spontaneously with good tone.  Full strength upper and lower extremities. No abnormal movements seen. Range of motion limited 2/2 body habitus.      Strength   Deltoids Triceps Biceps Wrist Extension Wrist Flexion Hand    Upper: R 5/5 5/5 5/5 5/5 5/5 5/5     L 5/5 5/5 5/5 5/5 5/5 5/5       Iliopsoas Quadriceps Knee  Flexion Tibialis  anterior Gastro- cnemius EHL   Lower: R 5/5 5/5 5/5 5/5 5/5 5/5     L 5/5 5/5 5/5 5/5 5/5 5/5      Stein: absent  Clonus: absent  Skin: Skin is warm, dry and intact    Significant Labs:  Recent Labs   Lab 10/14/20  0502 10/15/20  0333   GLU 89 99    140   K 2.7* 2.9*   CL 94* 93*   CO2 37* 35*   BUN 8 7*   CREATININE 0.7 0.8   CALCIUM 9.9 9.8     Recent Labs   Lab 10/14/20  0501 10/15/20  0333   WBC 8.52 10.12   HGB 10.1* 10.4*   HCT 34.4* 35.6*   * 402*     Recent Labs   Lab 10/15/20  0920   INR 1.1     Microbiology Results (last 7 days)     Procedure Component Value Units Date/Time    Blood culture [[id number]] Collected: 10/08/20 2345    Order Status: Completed Specimen: Blood Updated: 10/14/20 0612     Blood Culture, Routine No growth after 5 days.    Blood culture [[id number]] Collected: 10/08/20 2344    Order Status: Completed Specimen: Blood Updated: 10/14/20 0612     Blood Culture, Routine No growth after 5 days.        All pertinent labs from the last 24 hours have been reviewed.    Significant Diagnostics:  No results found in the last 24 hours.      Assessment/Plan:     * Discitis  Martina Betancourt is a 70 y.o. female with recent MRSA bacteremia who presents with new onselt back pain, found to haveT9-T10 discitis with bone erosion and possible adjacent soft tissue abscesses.      Neurologically intact on exam.     - All labs and imaging personally reviewed  - q4 neuro checks  - MRI w wo contrast of thoracic and lumbar spine shows discitis/osteomyelitis at T9-T10 with T8-T10 phlegmon. There is a bilateral spondylolysis at L5 and central canal narrowing from L3-L5.   - CT thoracic/lumbar spine shows T9-T10 discitis/osteomyelitis. No retropulsion, no focal kyphosis.   - No emergent neurosurgical intervention indicated at this time. Plan for IR biopsy of T9-T10 today, 10/15. Will follow up cultures  - ID: Recent hospitalization for MRSA bacteremia, likely infectious cause. Blood cultures 10/8 NGTD. WBC wnl, afebrile.    - ID recommending vanc + rocephin anticipated for 6-8 weeks.   - Constipation: Recommend escalate bowel regimen for c/o constipation   - Bilateral pleural effusions: seen on CT lumbar spine. On 3L NC. ID recommending pulm consult for diagnostic/therapeutic thoracentesis.  - Please notify neurosx for any changes in neuro exam              Rylie Manzo PA-C  Neurosurgery  [facility]

## 2020-10-15 NOTE — ASSESSMENT & PLAN NOTE
Martina Betancourt is a 70 y.o. female with recent MRSA bacteremia who presents with new onselt back pain, found to haveT9-T10 discitis with bone erosion and possible adjacent soft tissue abscesses.     Neurologically intact on exam.     - All labs and imaging personally reviewed  - q4 neuro checks  - MRI w wo contrast of thoracic and lumbar spine shows discitis/osteomyelitis at T9-T10 with T8-T10 phlegmon. There is a bilateral spondylolysis at L5 and central canal narrowing from L3-L5.   - CT thoracic/lumbar spine shows T9-T10 discitis/osteomyelitis. No retropulsion, no focal kyphosis.   - No emergent neurosurgical intervention indicated at this time. Plan for IR biopsy of T9-T10 today, 10/15. Will follow up cultures  - ID: Recent hospitalization for MRSA bacteremia, likely infectious cause. Blood cultures 10/8 NGTD. WBC wnl, afebrile.    - ID recommending vanc + rocephin anticipated for 6-8 weeks.   - Constipation: Recommend escalate bowel regimen for c/o constipation   - Bilateral pleural effusions: seen on CT lumbar spine. On 3L NC. ID recommending pulm consult for diagnostic/therapeutic thoracentesis.  - Please notify neurosx for any changes in neuro exam

## 2020-10-15 NOTE — PROGRESS NOTES
Pharmacokinetic Assessment Follow Up: IV Vancomycin    Vancomycin serum concentration assessment(s):    The trough level was drawn correctly and can be used to guide therapy at this time. The measurement is within the desired definitive target range of 15 to 20 mcg/mL.    Vancomycin Regimen Plan:    Continue regimen to Vancomycin 1750 mg IV every 24 hours with next serum trough concentration measured at 1630 prior to next dose on 10/16    Drug levels (last 3 results):  Recent Labs   Lab Result Units 10/13/20  0447 10/14/20  0501 10/15/20  1603   Vancomycin, Random ug/mL 20.7  --   --    Vancomycin-Trough ug/mL  --  23.3* 15.6       Pharmacy will continue to follow and monitor vancomycin.    Please contact pharmacy at extension [phone] for questions regarding this assessment.    Thank you for the consult,   Edwin Moseley       Patient brief summary:  Martina Betancourt is a 70 y.o. female initiated on antimicrobial therapy with IV Vancomycin for treatment of bone/joint infection    The patient's current regimen is vancomycin 1750 mg Q24    Drug Allergies:   Review of patient's allergies indicates:   Allergen Reactions    Hydroxyzine hcl     Tizanidine        Actual Body Weight:   137 kg    Renal Function:   Estimated Creatinine Clearance: 110 mL/min (based on SCr of 0.7 mg/dL).,     Dialysis Method (if applicable):  N/A    CBC (last 72 hours):  Recent Labs   Lab Result Units 10/13/20  0447 10/14/20  0501 10/15/20  0333   WBC K/uL 8.32 8.52 10.12   Hemoglobin g/dL 10.2* 10.1* 10.4*   Hematocrit % 35.7* 34.4* 35.6*   Platelets K/uL 441* 468* 402*   Gran% % 63.7 63.6 63.6   Lymph% % 23.0 21.2 22.0   Mono% % 11.7 13.4 13.3   Eosinophil% % 0.8 1.1 0.7   Basophil% % 0.4 0.5 0.2   Differential Method  Automated Automated Automated       Metabolic Panel (last 72 hours):  Recent Labs   Lab Result Units 10/13/20  0450 10/14/20  0502 10/15/20  0333 10/15/20  1641   Sodium mmol/L 141 140 140 140   Potassium mmol/L 2.8* 2.7* 2.9*  3.0*   Chloride mmol/L 96 94* 93* 95   CO2 mmol/L 36* 37* 35* 38*   Glucose mg/dL 85 89 99 129*   BUN, Bld mg/dL 8 8 7* 7*   Creatinine mg/dL 0.7 0.7 0.8 0.7   Albumin g/dL 2.0* 1.9* 2.1*  --    Total Bilirubin mg/dL 0.2 0.2 0.2  --    Alkaline Phosphatase U/L 86 88 92  --    AST U/L 11 9* 9*  --    ALT U/L 7* 5* 6*  --        Vancomycin Administrations:  vancomycin given in the last 96 hours                   vancomycin 1.75 g in 5 % dextrose 500 mL IVPB (mg) 1,750 mg New Bag 10/15/20 1804     1,750 mg New Bag 10/14/20 1734     1,750 mg New Bag 10/13/20 1603    vancomycin 1.75 g in 5 % dextrose 500 mL IVPB (mg) 1,750 mg New Bag 10/12/20 0610                Microbiologic Results:  Microbiology Results (last 7 days)     Procedure Component Value Units Date/Time    Blood culture [[id number]] Collected: 10/08/20 2345    Order Status: Completed Specimen: Blood Updated: 10/14/20 0612     Blood Culture, Routine No growth after 5 days.    Blood culture [[id number]] Collected: 10/08/20 2344    Order Status: Completed Specimen: Blood Updated: 10/14/20 0612     Blood Culture, Routine No growth after 5 days.

## 2020-10-15 NOTE — H&P
Inpatient Radiology Pre-procedure Note    History of Present Illness:  Martina Betancourt is a 70 y.o. female. She was recently admitted with MRSA bacteremia. MRI of the thoracic spine with T9-T10 discitis and paravertebral abscess. She presents for disc/paravertebral abscess aspiration.       Admission H&P reviewed.  Past Medical History:   Diagnosis Date    Alzheimer disease     Alzheimer disease     Coronary artery disease     Hyperlipidemia     Hypertension     Myopathy     Non-ST elevation (NSTEMI) myocardial infarction     Obesity     Pneumonia     Sleep apnea      Past Surgical History:   Procedure Laterality Date    APPENDECTOMY      APPENDECTOMY      CHOLECYSTECTOMY      CORONARY STENT PLACEMENT      HYSTERECTOMY      TONSILLECTOMY         Review of Systems:   As documented in primary team H&P    Home Meds:   Prior to Admission medications    Medication Sig Start Date End Date Taking? Authorizing Provider   acetaminophen (TYLENOL) 325 MG tablet Take 2 tablets (650 mg total) by mouth every 6 (six) hours as needed (HEADACHE, TEMPERATURE - FEVER > 100.4). 7/30/20  Yes Livia Earl NP   albuterol-ipratropium (DUO-NEB) 2.5 mg-0.5 mg/3 mL nebulizer solution Take 3 mLs by nebulization every 6 (six) hours as needed for Wheezing or Shortness of Breath. Rescue 8/12/20 8/12/21 Yes Trevon Nolen MD   aspirin (ECOTRIN) 81 MG EC tablet Take 81 mg by mouth once daily.   Yes Historical Provider   clopidogreL (PLAVIX) 75 mg tablet Take 1 tablet (75 mg total) by mouth once daily. 8/12/20 8/12/21 Yes Trevon Nolen MD   donepeziL (ARICEPT) 10 MG tablet Take 10 mg by mouth every evening.   Yes Historical Provider   DULoxetine (CYMBALTA) 60 MG capsule Take 60 mg by mouth once daily.   Yes Historical Provider   ferrous sulfate 324 mg (65 mg iron) TbEC Take 325 mg by mouth every evening.   Yes Historical Provider   furosemide (LASIX) 40 MG tablet Take 1 tablet (40 mg total) by mouth once daily.  8/12/20 8/12/21 Yes Trevon Nolen MD   isosorbide mononitrate (IMDUR) 60 MG 24 hr tablet Take 60 mg by mouth once daily.   Yes Historical Provider   lisinopriL (PRINIVIL,ZESTRIL) 2.5 MG tablet Take 1 tablet (2.5 mg total) by mouth once daily. 8/12/20 8/12/21 Yes Trevon Nolen MD   memantine (NAMENDA XR) 28 mg CSpX Take 28 mg by mouth once daily.   Yes Historical Provider   metoprolol succinate (TOPROL-XL) 50 MG 24 hr tablet Take 50 mg by mouth once daily.   Yes Historical Provider   miconazole nitrate 2% (MICOTIN) 2 % Oint Apply topically 2 (two) times daily. 8/12/20  Yes Trevon Nolen MD   pantoprazole (PROTONIX) 40 MG tablet Take 1 tablet (40 mg total) by mouth before breakfast. 7/31/20 7/31/21 Yes Livia Earl NP   pravastatin (PRAVACHOL) 40 MG tablet Take 40 mg by mouth every evening.   Yes Historical Provider     Scheduled Meds:    aspirin  81 mg Oral Daily    bisacodyL  5 mg Oral Daily    cefTRIAXone (ROCEPHIN) IVPB  2 g Intravenous Q24H    donepeziL  10 mg Oral QHS    DULoxetine  60 mg Oral Daily    enoxaparin  40 mg Subcutaneous Q24H    isosorbide mononitrate  60 mg Oral Daily    lisinopriL  2.5 mg Oral Daily    metoprolol succinate  50 mg Oral Daily    miconazole nitrate 2%   Topical (Top) BID    pantoprazole  40 mg Oral Before breakfast    potassium bicarbonate  50 mEq Oral Once    potassium chloride in water  10 mEq Intravenous Q1H    pravastatin  40 mg Oral QHS    vancomycin (VANCOCIN) IVPB  1,750 mg Intravenous Q24H     Continuous Infusions:   PRN Meds:albuterol-ipratropium, bisacodyL, glucose, glucose, HYDROcodone-acetaminophen, HYDROmorphone, melatonin, ondansetron, prochlorperazine, sodium chloride 0.9%, Pharmacy to dose Vancomycin consult **AND** vancomycin - pharmacy to dose  Anticoagulants/Antiplatelets: no anticoagulation    Allergies:   Review of patient's allergies indicates:   Allergen Reactions    Hydroxyzine hcl     Tizanidine      Sedation Hx:  have not been any systemic reactions    Labs:  No results for input(s): INR in the last 168 hours.    Invalid input(s):  PT,  PTT    Recent Labs   Lab 10/15/20  0333   WBC 10.12   HGB 10.4*   HCT 35.6*   MCV 93   *      Recent Labs   Lab 10/15/20  0333   GLU 99      K 2.9*   CL 93*   CO2 35*   BUN 7*   CREATININE 0.8   CALCIUM 9.8   ALT 6*   AST 9*   ALBUMIN 2.1*   BILITOT 0.2         Vitals:  Temp: 98 °F (36.7 °C) (10/15/20 0837)  Pulse: 62 (10/15/20 0837)  Resp: 10 (10/15/20 0837)  BP: (!) 148/66 (10/15/20 0837)  SpO2: 95 % (10/15/20 0837)     Physical Exam:  ASA: 3  Mallampati: 2    General: no acute distress  Mental Status: alert and oriented   HEENT: normocephalic, atraumatic  Chest: unlabored breathing. On nasal canula.   Heart: regular heart rate  Abdomen: nondistended  Extremity: moves all extremities    Plan:  - Image guided T9-T10 disc aspiration and possible paravertebral abscess aspiration.     Sedation Plan: up to moderate     Abeer MD Mia   PGY-4 Radiology

## 2020-10-16 LAB
ALBUMIN SERPL BCP-MCNC: 2.1 G/DL (ref 3.5–5.2)
ALP SERPL-CCNC: 88 U/L (ref 55–135)
ALT SERPL W/O P-5'-P-CCNC: 6 U/L (ref 10–44)
ANION GAP SERPL CALC-SCNC: 10 MMOL/L (ref 8–16)
ANISOCYTOSIS BLD QL SMEAR: SLIGHT
APPEARANCE FLD: CLEAR
AST SERPL-CCNC: 14 U/L (ref 10–40)
BASOPHILS # BLD AUTO: 0.03 K/UL (ref 0–0.2)
BASOPHILS NFR BLD: 0.4 % (ref 0–1.9)
BILIRUB SERPL-MCNC: 0.2 MG/DL (ref 0.1–1)
BODY FLD TYPE: NORMAL
BUN SERPL-MCNC: 6 MG/DL (ref 8–23)
CALCIUM SERPL-MCNC: 10 MG/DL (ref 8.7–10.5)
CHLORIDE SERPL-SCNC: 95 MMOL/L (ref 95–110)
CO2 SERPL-SCNC: 34 MMOL/L (ref 23–29)
COLOR FLD: NORMAL
CREAT SERPL-MCNC: 0.7 MG/DL (ref 0.5–1.4)
DIFFERENTIAL METHOD: ABNORMAL
EOSINOPHIL # BLD AUTO: 0.2 K/UL (ref 0–0.5)
EOSINOPHIL NFR BLD: 2 % (ref 0–8)
ERYTHROCYTE [DISTWIDTH] IN BLOOD BY AUTOMATED COUNT: 15.6 % (ref 11.5–14.5)
EST. GFR  (AFRICAN AMERICAN): >60 ML/MIN/1.73 M^2
EST. GFR  (NON AFRICAN AMERICAN): >60 ML/MIN/1.73 M^2
GLUCOSE SERPL-MCNC: 87 MG/DL (ref 70–110)
GRAM STN SPEC: NORMAL
GRAM STN SPEC: NORMAL
HCT VFR BLD AUTO: 36.7 % (ref 37–48.5)
HGB BLD-MCNC: 10.6 G/DL (ref 12–16)
HYPOCHROMIA BLD QL SMEAR: ABNORMAL
IMM GRANULOCYTES # BLD AUTO: 0.03 K/UL (ref 0–0.04)
IMM GRANULOCYTES NFR BLD AUTO: 0.4 % (ref 0–0.5)
LYMPHOCYTES # BLD AUTO: 1.8 K/UL (ref 1–4.8)
LYMPHOCYTES NFR BLD: 23 % (ref 18–48)
LYMPHOCYTES NFR FLD MANUAL: 23 %
MCH RBC QN AUTO: 27.2 PG (ref 27–31)
MCHC RBC AUTO-ENTMCNC: 28.9 G/DL (ref 32–36)
MCV RBC AUTO: 94 FL (ref 82–98)
MONOCYTES # BLD AUTO: 1 K/UL (ref 0.3–1)
MONOCYTES NFR BLD: 12.7 % (ref 4–15)
MONOS+MACROS NFR FLD MANUAL: 12 %
NEUTROPHILS # BLD AUTO: 4.8 K/UL (ref 1.8–7.7)
NEUTROPHILS NFR BLD: 61.5 % (ref 38–73)
NEUTROPHILS NFR FLD MANUAL: 65 %
NRBC BLD-RTO: 0 /100 WBC
OVALOCYTES BLD QL SMEAR: ABNORMAL
PLATELET # BLD AUTO: 355 K/UL (ref 150–350)
PLATELET BLD QL SMEAR: ABNORMAL
PMV BLD AUTO: 10.5 FL (ref 9.2–12.9)
POTASSIUM SERPL-SCNC: 3.9 MMOL/L (ref 3.5–5.1)
PROT SERPL-MCNC: 7.2 G/DL (ref 6–8.4)
RBC # BLD AUTO: 3.9 M/UL (ref 4–5.4)
SODIUM SERPL-SCNC: 139 MMOL/L (ref 136–145)
TARGETS BLD QL SMEAR: ABNORMAL
TB INDURATION 48 - 72 HR READ: 0 MM
WBC # BLD AUTO: 7.81 K/UL (ref 3.9–12.7)
WBC # FLD: 10 /CU MM

## 2020-10-16 PROCEDURE — 99232 PR SUBSEQUENT HOSPITAL CARE,LEVL II: ICD-10-PCS | Mod: ,,, | Performed by: PHYSICIAN ASSISTANT

## 2020-10-16 PROCEDURE — 63600175 PHARM REV CODE 636 W HCPCS: Performed by: STUDENT IN AN ORGANIZED HEALTH CARE EDUCATION/TRAINING PROGRAM

## 2020-10-16 PROCEDURE — 88307 TISSUE EXAM BY PATHOLOGIST: CPT | Mod: 26,,, | Performed by: PATHOLOGY

## 2020-10-16 PROCEDURE — 87070 CULTURE OTHR SPECIMN AEROBIC: CPT

## 2020-10-16 PROCEDURE — 87206 SMEAR FLUORESCENT/ACID STAI: CPT

## 2020-10-16 PROCEDURE — 63600175 PHARM REV CODE 636 W HCPCS: Performed by: PHYSICIAN ASSISTANT

## 2020-10-16 PROCEDURE — 88311 DECALCIFY TISSUE: CPT | Performed by: PATHOLOGY

## 2020-10-16 PROCEDURE — 87116 MYCOBACTERIA CULTURE: CPT

## 2020-10-16 PROCEDURE — 87015 SPECIMEN INFECT AGNT CONCNTJ: CPT

## 2020-10-16 PROCEDURE — 25000003 PHARM REV CODE 250: Performed by: HOSPITALIST

## 2020-10-16 PROCEDURE — 87102 FUNGUS ISOLATION CULTURE: CPT

## 2020-10-16 PROCEDURE — 80053 COMPREHEN METABOLIC PANEL: CPT

## 2020-10-16 PROCEDURE — 85025 COMPLETE CBC W/AUTO DIFF WBC: CPT

## 2020-10-16 PROCEDURE — 99233 SBSQ HOSP IP/OBS HIGH 50: CPT | Mod: ,,, | Performed by: STUDENT IN AN ORGANIZED HEALTH CARE EDUCATION/TRAINING PROGRAM

## 2020-10-16 PROCEDURE — 99232 SBSQ HOSP IP/OBS MODERATE 35: CPT | Mod: ,,, | Performed by: PHYSICIAN ASSISTANT

## 2020-10-16 PROCEDURE — 11000001 HC ACUTE MED/SURG PRIVATE ROOM

## 2020-10-16 PROCEDURE — 88307 TISSUE EXAM BY PATHOLOGIST: CPT | Performed by: PATHOLOGY

## 2020-10-16 PROCEDURE — 63600175 PHARM REV CODE 636 W HCPCS: Performed by: HOSPITALIST

## 2020-10-16 PROCEDURE — 97803 MED NUTRITION INDIV SUBSEQ: CPT

## 2020-10-16 PROCEDURE — 36415 COLL VENOUS BLD VENIPUNCTURE: CPT

## 2020-10-16 PROCEDURE — 63600175 PHARM REV CODE 636 W HCPCS: Performed by: RADIOLOGY

## 2020-10-16 PROCEDURE — 87075 CULTR BACTERIA EXCEPT BLOOD: CPT

## 2020-10-16 PROCEDURE — 88311 PR  DECALCIFY TISSUE: ICD-10-PCS | Mod: 26,,, | Performed by: PATHOLOGY

## 2020-10-16 PROCEDURE — 89051 BODY FLUID CELL COUNT: CPT

## 2020-10-16 PROCEDURE — 25000003 PHARM REV CODE 250: Performed by: RADIOLOGY

## 2020-10-16 PROCEDURE — 99233 PR SUBSEQUENT HOSPITAL CARE,LEVL III: ICD-10-PCS | Mod: ,,, | Performed by: STUDENT IN AN ORGANIZED HEALTH CARE EDUCATION/TRAINING PROGRAM

## 2020-10-16 PROCEDURE — 87205 SMEAR GRAM STAIN: CPT

## 2020-10-16 PROCEDURE — 88311 DECALCIFY TISSUE: CPT | Mod: 26,,, | Performed by: PATHOLOGY

## 2020-10-16 PROCEDURE — 25000003 PHARM REV CODE 250: Performed by: STUDENT IN AN ORGANIZED HEALTH CARE EDUCATION/TRAINING PROGRAM

## 2020-10-16 PROCEDURE — 88307 PR  SURG PATH,LEVEL V: ICD-10-PCS | Mod: 26,,, | Performed by: PATHOLOGY

## 2020-10-16 PROCEDURE — 63600175 PHARM REV CODE 636 W HCPCS: Performed by: INTERNAL MEDICINE

## 2020-10-16 RX ORDER — FENTANYL CITRATE 50 UG/ML
INJECTION, SOLUTION INTRAMUSCULAR; INTRAVENOUS CODE/TRAUMA/SEDATION MEDICATION
Status: COMPLETED | OUTPATIENT
Start: 2020-10-16 | End: 2020-10-16

## 2020-10-16 RX ORDER — ENOXAPARIN SODIUM 100 MG/ML
40 INJECTION SUBCUTANEOUS EVERY 12 HOURS
Status: DISCONTINUED | OUTPATIENT
Start: 2020-10-16 | End: 2020-10-20 | Stop reason: HOSPADM

## 2020-10-16 RX ORDER — MIDAZOLAM HYDROCHLORIDE 1 MG/ML
INJECTION INTRAMUSCULAR; INTRAVENOUS CODE/TRAUMA/SEDATION MEDICATION
Status: COMPLETED | OUTPATIENT
Start: 2020-10-16 | End: 2020-10-16

## 2020-10-16 RX ORDER — LIDOCAINE HYDROCHLORIDE 10 MG/ML
INJECTION INFILTRATION; PERINEURAL CODE/TRAUMA/SEDATION MEDICATION
Status: COMPLETED | OUTPATIENT
Start: 2020-10-16 | End: 2020-10-16

## 2020-10-16 RX ORDER — HYDRALAZINE HYDROCHLORIDE 50 MG/1
50 TABLET, FILM COATED ORAL EVERY 8 HOURS PRN
Status: DISCONTINUED | OUTPATIENT
Start: 2020-10-16 | End: 2020-10-20 | Stop reason: HOSPADM

## 2020-10-16 RX ADMIN — HYDROMORPHONE HYDROCHLORIDE 0.5 MG: 1 INJECTION, SOLUTION INTRAMUSCULAR; INTRAVENOUS; SUBCUTANEOUS at 12:10

## 2020-10-16 RX ADMIN — HYDROCODONE BITARTRATE AND ACETAMINOPHEN 1 TABLET: 10; 325 TABLET ORAL at 10:10

## 2020-10-16 RX ADMIN — CEFTRIAXONE SODIUM 2 G: 2 INJECTION, SOLUTION INTRAVENOUS at 09:10

## 2020-10-16 RX ADMIN — ENOXAPARIN SODIUM 40 MG: 40 INJECTION SUBCUTANEOUS at 09:10

## 2020-10-16 RX ADMIN — MIDAZOLAM HYDROCHLORIDE 0.5 MG: 1 INJECTION, SOLUTION INTRAMUSCULAR; INTRAVENOUS at 11:10

## 2020-10-16 RX ADMIN — FENTANYL CITRATE 25 MCG: 50 INJECTION, SOLUTION INTRAMUSCULAR; INTRAVENOUS at 12:10

## 2020-10-16 RX ADMIN — FENTANYL CITRATE 50 MCG: 50 INJECTION, SOLUTION INTRAMUSCULAR; INTRAVENOUS at 11:10

## 2020-10-16 RX ADMIN — LIDOCAINE HYDROCHLORIDE 5 ML: 10 INJECTION, SOLUTION INFILTRATION; PERINEURAL at 11:10

## 2020-10-16 RX ADMIN — HYDROCODONE BITARTRATE AND ACETAMINOPHEN 1 TABLET: 10; 325 TABLET ORAL at 04:10

## 2020-10-16 RX ADMIN — PRAVASTATIN SODIUM 40 MG: 40 TABLET ORAL at 09:10

## 2020-10-16 RX ADMIN — HYDROCODONE BITARTRATE AND ACETAMINOPHEN 1 TABLET: 10; 325 TABLET ORAL at 11:10

## 2020-10-16 RX ADMIN — METOPROLOL SUCCINATE 50 MG: 50 TABLET, EXTENDED RELEASE ORAL at 09:10

## 2020-10-16 RX ADMIN — MELATONIN TAB 3 MG 6 MG: 3 TAB at 11:10

## 2020-10-16 RX ADMIN — MIDAZOLAM HYDROCHLORIDE 0.5 MG: 1 INJECTION, SOLUTION INTRAMUSCULAR; INTRAVENOUS at 12:10

## 2020-10-16 RX ADMIN — DONEPEZIL HYDROCHLORIDE 10 MG: 10 TABLET ORAL at 09:10

## 2020-10-16 RX ADMIN — MICONAZOLE NITRATE: 20 OINTMENT TOPICAL at 09:10

## 2020-10-16 RX ADMIN — ISOSORBIDE MONONITRATE 60 MG: 30 TABLET, EXTENDED RELEASE ORAL at 01:10

## 2020-10-16 RX ADMIN — FENTANYL CITRATE 25 MCG: 50 INJECTION, SOLUTION INTRAMUSCULAR; INTRAVENOUS at 11:10

## 2020-10-16 RX ADMIN — DULOXETINE HYDROCHLORIDE 60 MG: 30 CAPSULE, DELAYED RELEASE ORAL at 09:10

## 2020-10-16 RX ADMIN — HYDROCODONE BITARTRATE AND ACETAMINOPHEN 1 TABLET: 10; 325 TABLET ORAL at 12:10

## 2020-10-16 RX ADMIN — HYDRALAZINE HYDROCHLORIDE 50 MG: 50 TABLET, FILM COATED ORAL at 04:10

## 2020-10-16 RX ADMIN — LISINOPRIL 2.5 MG: 2.5 TABLET ORAL at 09:10

## 2020-10-16 RX ADMIN — HYDROMORPHONE HYDROCHLORIDE 0.5 MG: 1 INJECTION, SOLUTION INTRAMUSCULAR; INTRAVENOUS; SUBCUTANEOUS at 07:10

## 2020-10-16 RX ADMIN — MICONAZOLE NITRATE: 20 OINTMENT TOPICAL at 01:10

## 2020-10-16 RX ADMIN — VANCOMYCIN HYDROCHLORIDE 1750 MG: 10 INJECTION, POWDER, LYOPHILIZED, FOR SOLUTION INTRAVENOUS at 04:10

## 2020-10-16 NOTE — PLAN OF CARE
Recommendations    Recommendation:   1. Continue regular diet, encourage good PO intake at meals   2. RD to monitor and follow up    Goals: pt to tolerate >85% of EEN/EPN by RD follow up  Nutrition Goal Status: goal met  Communication of RD Recs: other (comment)(POC)

## 2020-10-16 NOTE — ASSESSMENT & PLAN NOTE
70 year old female recently admitted to OSH with MRSA bacteremia and subsequent pneumonia treated with Zyvox x 7 days with clearance of her bacteremia now admitted with back pain found to have T9-10 osteo discitis, T8-10 epidural phlegmon with associated paraverterbral abscesses. Also noted to have bilateral complex pleural effusions. Spine infection likely hematogenous from under treated bacteremia. Neurosurgery has been consulted - unfortunately no plans for surgical intervention at this time. IR is planning for IR aspiration tomorrow. On Vancomycin and Ceftriaxone. Afebrile. HDS. Repeat blood cx sterile.    Neurosurgery does not plan for surgical intervention,  IR drainage of abscesses and send fluid for gram stain, aerobic, anaerobic, AFB and fungal cultures planned.  Stable non septic    Plan  - Continue Vancomycin and Ceftriaxone. Vanc trough goal 15-20.  - Send IR cultures - NSGY team made aware  - Monitor respiratory status closely. Consider pulm consult for diagnostic/therapeutic thoracentesis.   - Plan for 6 - 8 weeks of IV antibiotics with repeat MRI at midpoint of treatment as no surgical drainage undertaken  - ID will follow.

## 2020-10-16 NOTE — SUBJECTIVE & OBJECTIVE
Interval History: NAEON. Afebrile. WBC WNL. Back pain somewhat improved. Going for aspiration today.  The patient denies any recent fever, chills, or sweats.      Review of Systems   Constitutional: Negative for chills, diaphoresis and fever.   Respiratory: Positive for shortness of breath. Negative for cough.    Cardiovascular: Negative for chest pain and palpitations.   Gastrointestinal: Negative for constipation and nausea.   Genitourinary: Positive for flank pain. Negative for difficulty urinating and dysuria.   Musculoskeletal: Positive for back pain. Negative for arthralgias and neck pain.   Skin: Negative for rash and wound.   Neurological: Negative for weakness and numbness.   Psychiatric/Behavioral: Negative for agitation and confusion. The patient is not nervous/anxious.      Objective:     Vital Signs (Most Recent):  Temp: 96.5 °F (35.8 °C) (10/15/20 1924)  Pulse: 62 (10/15/20 1924)  Resp: 18 (10/15/20 1924)  BP: 138/65 (10/15/20 1924)  SpO2: 95 % (10/15/20 1924) Vital Signs (24h Range):  Temp:  [96.5 °F (35.8 °C)-98 °F (36.7 °C)] 96.5 °F (35.8 °C)  Pulse:  [62-65] 62  Resp:  [10-21] 18  SpO2:  [92 %-95 %] 95 %  BP: (138-161)/(65-72) 138/65     Weight: (!) 137 kg (302 lb)  Body mass index is 43.33 kg/m².    Estimated Creatinine Clearance: 110 mL/min (based on SCr of 0.7 mg/dL).    Physical Exam  Constitutional:       General: She is not in acute distress.     Appearance: She is well-developed. She is not diaphoretic.   HENT:      Head: Normocephalic and atraumatic.   Eyes:      General: No scleral icterus.     Conjunctiva/sclera: Conjunctivae normal.   Cardiovascular:      Rate and Rhythm: Normal rate and regular rhythm.   Pulmonary:      Effort: Pulmonary effort is normal. No respiratory distress.      Comments: O2 via NC  Abdominal:      General: There is no distension.      Palpations: Abdomen is soft.      Tenderness: There is no abdominal tenderness. There is no guarding.   Musculoskeletal:          General: No tenderness.   Skin:     General: Skin is warm and dry.      Findings: No rash.   Neurological:      Mental Status: She is alert and oriented to person, place, and time.   Psychiatric:         Mood and Affect: Mood normal.         Behavior: Behavior normal.         Thought Content: Thought content normal.         Significant Labs: All pertinent labs within the past 24 hours have been reviewed.    Significant Imaging: I have reviewed all pertinent imaging results/findings within the past 24 hours.   CT Thoracic Spine Without Contrast [[id number]] (Abnormal) Resulted: 10/13/20 0854   Order Status: Completed Updated: 10/13/20 0856   Narrative:     EXAMINATION:   CT THORACIC SPINE WITHOUT CONTRAST; CT LUMBAR SPINE WITHOUT CONTRAST     CLINICAL HISTORY:   eval discitis;     TECHNIQUE:   Low dose axial images, sagittal and coronal reformations were performed though the thoracic and lumbar spine.  Contrast was not administered.     COMPARISON:   MRI thoracic and lumbar spine 10/12/2020     FINDINGS:   Thoracic spinal alignment within normal limits.  Destructive changes centered about the T9-T10 intervertebral disc and adjacent endplates concerning for T9-T10 discitis/osteomyelitis.  Resulting moderate vertebral body height loss at T9 and T10 identified.  Increased soft tissue density about the affected vertebral bodies keeping with known multilocular paravertebral abscesses, better evaluated on MRI 10/12/2020.  Osseous fragments within the disc space abut and narrow the ventral aspect of the thecal sac and result in severe bilateral foraminal narrowing.     Vertebral body heights at the unaffected levels are maintained.  Mild multilevel intervertebral disc height loss.  Mild thoracic spondylosis identified, most notable in the upper thoracic spine resulting in mild left, moderate right neural foraminal narrowing at T2-T3, moderate right neural foraminal narrowing T3-T4, and mild right neural foraminal narrowing  at T4-T5.  Multilevel circumferential disc bulge is identified.     Bilateral L5 pars defects with grade 2 anterolisthesis of L5 on S1.  Grade 1 anterolisthesis of L3 on L4.  No acute fracture.  Vertebral body heights are maintained.  There is multilevel intervertebral disc height loss most notable at L4-L5 and L5-S1.  Prominent multilevel anterior marginal osteophytosis.     T12-L1: No significant disc bulge, neural foraminal narrowing, or spinal canal stenosis     L1-L2: Circumferential disc bulge, ligamentum flavum thickening, and facet hypertrophy resulting in mild bilateral neural foraminal narrowing, right greater than left.  No spinal canal stenosis.     L2-L3: Circumferential disc bulge, advanced facet hypertrophy and ligamentum flavum thickening resulting in moderate spinal canal stenosis and mild right neural foraminal narrowing.     L3-L4: Circumferential disc bulge, advanced facet hypertrophy, and ligamentum flavum thickening resulting in severe spinal canal stenosis and mild right neural foraminal narrowing.     L4-L5: Circumferential disc bulge, advanced facet hypertrophy, and ligamentum flavum thickening resulting in severe spinal canal stenosis and mild right neural foraminal narrowing.     L5-S1: Uncovering of the intervertebral disc and advanced facet hypertrophy resulting in severe bilateral neural foraminal narrowing.  No spinal canal stenosis.     Limited evaluation of the intrathoracic structures reveals aortic annular calcification and coronary artery atherosclerosis.  Bilateral pleural effusions identified, larger on the left with compressive atelectasis/volume loss of adjacent lung parenchyma.  Moderate-large hiatal hernia.  Stable nodular thickening of the right adrenal gland, unchanged compared to prior studies.     Limited evaluation of abdominopelvic structures reveals operative change of cholecystectomy, severe aortic atherosclerosis into the branch vessels, mild paraspinal muscular  atrophy, and advanced sigmoid diverticulosis.    Impression:       Findings above consistent with T9-T10 discitis/osteomyelitis with multilocular paravertebral abscesses, better evaluated on MRI 10/12/2020.  No significant retropulsion osseous fragments into the canal.     Multilevel lumbar spondylosis resulting in moderate to severe spinal canal stenosis from L2-L3 through L4-L5 and severe bilateral neural foraminal narrowing at L5-S1.     Mild multilevel thoracic spondylosis, detailed above.     Bilateral pleural effusions, larger on the left with compressive atelectasis/volume loss of adjacent lung parenchyma.     Moderate-large size hiatal hernia     Multi-vessel coronary artery atherosclerosis.     Additional findings detailed above.     This report was flagged in Epic as abnormal.     Electronically signed by resident: Oscar Castro MD   Date: 10/13/2020   Time: 07:49     Electronically signed by: Israel Diaz MD   Date: 10/13/2020   Time: 08:54   CT Lumbar Spine Without Contrast [[id number]] (Abnormal) Resulted: 10/13/20 0854   Order Status: Completed Updated: 10/13/20 0856   Narrative:     EXAMINATION:   CT THORACIC SPINE WITHOUT CONTRAST; CT LUMBAR SPINE WITHOUT CONTRAST     CLINICAL HISTORY:   eval discitis;     TECHNIQUE:   Low dose axial images, sagittal and coronal reformations were performed though the thoracic and lumbar spine.  Contrast was not administered.     COMPARISON:   MRI thoracic and lumbar spine 10/12/2020     FINDINGS:   Thoracic spinal alignment within normal limits.  Destructive changes centered about the T9-T10 intervertebral disc and adjacent endplates concerning for T9-T10 discitis/osteomyelitis.  Resulting moderate vertebral body height loss at T9 and T10 identified.  Increased soft tissue density about the affected vertebral bodies keeping with known multilocular paravertebral abscesses, better evaluated on MRI 10/12/2020.  Osseous fragments within the disc space abut and narrow  the ventral aspect of the thecal sac and result in severe bilateral foraminal narrowing.     Vertebral body heights at the unaffected levels are maintained.  Mild multilevel intervertebral disc height loss.  Mild thoracic spondylosis identified, most notable in the upper thoracic spine resulting in mild left, moderate right neural foraminal narrowing at T2-T3, moderate right neural foraminal narrowing T3-T4, and mild right neural foraminal narrowing at T4-T5.  Multilevel circumferential disc bulge is identified.     Bilateral L5 pars defects with grade 2 anterolisthesis of L5 on S1.  Grade 1 anterolisthesis of L3 on L4.  No acute fracture.  Vertebral body heights are maintained.  There is multilevel intervertebral disc height loss most notable at L4-L5 and L5-S1.  Prominent multilevel anterior marginal osteophytosis.     T12-L1: No significant disc bulge, neural foraminal narrowing, or spinal canal stenosis     L1-L2: Circumferential disc bulge, ligamentum flavum thickening, and facet hypertrophy resulting in mild bilateral neural foraminal narrowing, right greater than left.  No spinal canal stenosis.     L2-L3: Circumferential disc bulge, advanced facet hypertrophy and ligamentum flavum thickening resulting in moderate spinal canal stenosis and mild right neural foraminal narrowing.     L3-L4: Circumferential disc bulge, advanced facet hypertrophy, and ligamentum flavum thickening resulting in severe spinal canal stenosis and mild right neural foraminal narrowing.     L4-L5: Circumferential disc bulge, advanced facet hypertrophy, and ligamentum flavum thickening resulting in severe spinal canal stenosis and mild right neural foraminal narrowing.     L5-S1: Uncovering of the intervertebral disc and advanced facet hypertrophy resulting in severe bilateral neural foraminal narrowing.  No spinal canal stenosis.     Limited evaluation of the intrathoracic structures reveals aortic annular calcification and coronary  artery atherosclerosis.  Bilateral pleural effusions identified, larger on the left with compressive atelectasis/volume loss of adjacent lung parenchyma.  Moderate-large hiatal hernia.  Stable nodular thickening of the right adrenal gland, unchanged compared to prior studies.     Limited evaluation of abdominopelvic structures reveals operative change of cholecystectomy, severe aortic atherosclerosis into the branch vessels, mild paraspinal muscular atrophy, and advanced sigmoid diverticulosis.    Impression:       Findings above consistent with T9-T10 discitis/osteomyelitis with multilocular paravertebral abscesses, better evaluated on MRI 10/12/2020.  No significant retropulsion osseous fragments into the canal.     Multilevel lumbar spondylosis resulting in moderate to severe spinal canal stenosis from L2-L3 through L4-L5 and severe bilateral neural foraminal narrowing at L5-S1.     Mild multilevel thoracic spondylosis, detailed above.     Bilateral pleural effusions, larger on the left with compressive atelectasis/volume loss of adjacent lung parenchyma.     Moderate-large size hiatal hernia     Multi-vessel coronary artery atherosclerosis.     Additional findings detailed above.     This report was flagged in Epic as abnormal.     Electronically signed by resident: Oscar Castro MD   Date: 10/13/2020   Time: 07:49     Electronically signed by: Israel Diaz MD   Date: 10/13/2020   Time: 08:54   MRI Thoracic Spine W WO Cont [[id number]] (Abnormal) Resulted: 10/12/20 1620   Order Status: Completed Updated: 10/12/20 1623   Narrative:     EXAMINATION:   MRI THORACIC SPINE W WO CONTRAST     CLINICAL HISTORY:   eval discitis;     TECHNIQUE:   Multiplanar, multisequence MR images were acquired from the cervicothoracic junction to the thoracolumbar junction with and without Gadavist contrast.     COMPARISON:   MRI thoracic spine without contrast 10/08/2020. CTA chest 08/09/2020.     FINDINGS:   Alignment: Normal.      Vertebrae and discs: Fluid signal within the T9-T10 disc space with destructive changes and enhancement of the adjacent vertebral body endplates along with marrow replacement, compatible with discitis and osteomyelitis. There is extension into the posterior spinal elements. There is mild T9 and T10 vertebral body height loss.  Redemonstration of multilocular paravertebral abscesses adjacent to the T9-T10 disc space.  The largest collection on the right measures 3.3 x 1.6 cm.  The collection on the left measures 1.6 x 0.8 cm.     Cord: There is thickening of the dura at the T8-T10 level with enhancement compatible with epidural phlegmon, resulting in moderate narrowing of the thecal sac.  There is associated nerve root enhancement, which may be seen with arachnoiditis.  No definite spinal cord edema.     Degenerative findings:     Mild degenerative changes and disc bulges in the upper thoracic spine, most prominent at T5-T6, where circumferential disc bulge results in mild compression of the thecal sac.  There is mild bilateral neural foraminal narrowing at T2-T3.     Paraspinal muscles & soft tissues: Bilateral lower lung zone airspace consolidation and complex pleural effusions with enhancement.  Note made of hiatal hernia.    Impression:       Discitis/osteomyelitis at the T9-T10 level with multilocular paravertebral abscesses similar to prior exam.     T8-T10 epidural phlegmon results in moderate narrowing of the thecal sac. There is associated nerve root enhancement, which may be seen with arachnoiditis.     Bilateral complex pleural effusions with enhancement.  Empyema is a consideration.  Consider further evaluation with contrast enhanced chest CT.     Multilevel mild degenerative changes and disc bulges in the thoracic spine, most prominent at T5-T6.     Additional findings as above.     This report was flagged in Epic as abnormal.     Electronically signed by resident: Efrem Urena   Date:  10/12/2020   Time: 14:05     Electronically signed by: Tiago Luna MD   Date: 10/12/2020   Time: 16:20   MRI Lumbar Spine W WO Cont [[id number]] Resulted: 10/12/20 1330   Order Status: Completed Updated: 10/12/20 1333   Narrative:     EXAMINATION:   MRI LUMBAR SPINE W WO CONTRAST     CLINICAL HISTORY:   eval discitis;     TECHNIQUE:   Multiplanar, multisequence MR images were acquired from the thoracolumbar junction to the sacrum without and with 10 mL Gadavist intravenous contrast.     COMPARISON:   01/25/2016     FINDINGS:   Alignment: Grade 2 anterolisthesis at L5-S1 secondary to bilateral L5 spondylolysis.  Grade 1 anterolisthesis noted at L3-L4.     Vertebrae: There are multilevel degenerative endplate changes.  No fracture or infiltrative process.     Discs: There is moderate to severe disc height loss throughout the lower lumbar spine.  Mild fluid signal noted at L3-S1, likely degenerative.     Cord: Normal.  Conus terminates at L1.     Enhancement: Mild enhancement noted about the arthritic L5-S1 facet joints, likely reactive.  No nerve root enhancement.     Degenerative findings:     T12-L1: No spinal canal stenosis or neural foraminal narrowing.     L1-L2: Mild facet arthropathy resulting in mild bilateral neural foraminal narrowing.     L2-L3: Circumferential disc bulge and moderate facet arthropathy result in moderate spinal canal stenosis and mild right neural foraminal narrowing.     L3-L4: Circumferential disc bulge and severe facet arthropathy result in severe spinal canal stenosis and mild right neural foraminal narrowing.     L4-L5: Circumferential disc bulge and moderate facet arthropathy result in moderate spinal canal stenosis and mild bilateral neural foraminal narrowing.     L5-S1: Malalignment with moderate facet arthropathy result in severe bilateral neural foraminal narrowing.     Paraspinal muscles & soft tissues: Moderate paraspinal muscle atrophy.    Impression:       No evidence for  spondylodiscitis.     Bilateral L5 spondylolysis with grade 2 anterolisthesis.     Multilevel degenerative changes of the lumbar spine detailed above.  Moderate to severe spinal canal stenosis noted at L2-L5.  Severe neural foraminal narrowing noted at L5-S1.       Electronically signed by: Tiago Luna MD   Date: 10/12/2020   Time: 13:30   Imaging History    2020  Date Procedure Name Status Accession Number Location   10/13/20 01:04 AM CT Thoracic Spine Without Contrast Final [id number] [facility]   10/13/20 01:04 AM CT Lumbar Spine Without Contrast Final [id number] [facility]   10/12/20 01:21 PM MRI Thoracic Spine W WO Cont Final [id number] [facility]   10/12/20 01:20 PM MRI Lumbar Spine W WO Cont Final [id number] [facility]   10/08/20 12:37 PM MRI Thoracic Spine Without Contrast Final [id number] [facility]   10/07/20 02:35 PM CT Renal Stone Study ABD Pelvis WO Final [id number] [facility]   10/07/20 02:35 PM X-Ray Chest AP Portable Final [id number] [facility]   09/23/20 01:18 PM X-Ray Chest 1 View Final [id number] [facility]   09/23/20 12:00 AM CARDIAC MONITORING STRIPS Final     10/12/20 09:30 AM Echo Color Flow Doppler? Yes Final

## 2020-10-16 NOTE — TREATMENT PLAN
Chart review:    Patient not in room - in IR for aspiration.    Afebrile and WBC WNL.  Vanc therapeutic.  Blood cultures cleared.    Plan:  1. Continue vanc  2. FU aspiration cultures  3. [place].    Contact Ivanna Adhikari NP for questions during day an ID on call overnight for questions.

## 2020-10-16 NOTE — PROGRESS NOTES
"[facility]  Adult Nutrition  Progress Note    SUMMARY       Recommendations    Recommendation:   1. Continue regular diet, encourage good PO intake at meals   2. RD to monitor and follow up    Goals: pt to tolerate >85% of EEN/EPN by RD follow up  Nutrition Goal Status: goal met  Communication of RD Recs: other (comment)(POC)    Reason for Assessment    Reason For Assessment: RD follow-up  Diagnosis: (discitis)  Relevant Medical History: HTN; CAD; obesity; HLD; myopathy  Interdisciplinary Rounds: did not attend  General Information Comments: Pt MEKA at time of visit. Per chart pt with good PO intake at this time, PO 75% of meals. Wt stable since admit. Recent wt loss per pt from UBW ~345 lbs. NFPE completed on 10/9 pt meets criteria for moderate malnutrition at this time.  Nutrition Discharge Planning: adequate PO intake    Nutrition Risk Screen    Nutrition Risk Screen: no indicators present    Nutrition/Diet History    Spiritual, Cultural Beliefs, Anglican Practices, Values that Affect Care: no  Food Allergies: NKFA  Factors Affecting Nutritional Intake: None identified at this time    Anthropometrics    Temp: 98.4 °F (36.9 °C)  Height: 5' 10" (177.8 cm)  Height (inches): 70 in  Weight Method: Bed Scale  Weight: (!) 137 kg (302 lb)  Weight (lb): (!) 302 lb  Ideal Body Weight (IBW), Female: 150 lb  % Ideal Body Weight, Female (lb): 201.33 %  BMI (Calculated): 43.3  BMI Grade: greater than 40 - morbid obesity    Lab/Procedures/Meds    Pertinent Labs Reviewed: reviewed  Pertinent Labs Comments: BUN 6  Pertinent Medications Reviewed: reviewed  Pertinent Medications Comments: vancomycin; statin; enoxaparin; metoprolol     Estimated/Assessed Needs    Weight Used For Calorie Calculations: (!) 137 kg (302 lb 0.5 oz)  Energy Calorie Requirements (kcal): 1960 kcal/d  Energy Need Method: [place]  Protein Requirements: 109-137 g/day  Weight Used For Protein Calculations: (!) 137 kg (302 lb 0.5 " oz)  Fluid Requirements (mL): 1 mL/kcal or per MD  RDA Method (mL): 1960    Nutrition Prescription Ordered    Current Diet Order: NPO    Evaluation of Received Nutrient/Fluid Intake    I/O: -6.0 L since admit  Energy Calories Required: meeting needs  Protein Required: meeting needs  Fluid Required: meeting needs  Comments: LBM 10/15  Tolerance: tolerating  % Intake of Estimated Energy Needs: 75 - 100 %  % Meal Intake: 75 - 100 %    Nutrition Risk    Level of Risk/Frequency of Follow-up: low     Assessment and Plan  Nutrition Problem:   Moderate Protein-Calorie Malnutrition  Malnutrition in the context of Chronic Illness/Injury     Related to (etiology):  Decreased intake      Signs and Symptoms (as evidenced by):  Energy Intake: <75% of estimated energy requirement for >1 month   Body Fat Depletion: mild depletion of orbitals   Muscle Mass Depletion: mild depletion of temples   Weight Loss: 12% x 1 month      Interventions(treatment strategy):  Collaboration of care with other providers     Nutrition Diagnosis Status:  Continues    Monitor and Evaluation    Food and Nutrient Intake: energy intake, food and beverage intake  Food and Nutrient Adminstration: diet order  Knowledge/Beliefs/Attitudes: food and nutrition knowledge/skill  Anthropometric Measurements: weight, weight change  Biochemical Data, Medical Tests and Procedures: electrolyte and renal panel, gastrointestinal profile, glucose/endocrine profile, inflammatory profile, lipid profile  Nutrition-Focused Physical Findings: overall appearance     Malnutrition Assessment  Orbital Region (Subcutaneous Fat Loss): mild depletion  Upper Arm Region (Subcutaneous Fat Loss): well nourished  Thoracic and Lumbar Region: well nourished   Gnosticist Region (Muscle Loss): mild depletion  Clavicle Bone Region (Muscle Loss): well nourished  Clavicle and Acromion Bone Region (Muscle Loss): well nourished  Dorsal Hand (Muscle Loss): mild depletion  Anterior Thigh Region (Muscle  Loss): well nourished  Posterior Calf Region (Muscle Loss): well nourished       Nutrition Follow-Up    RD Follow-up?: Yes

## 2020-10-16 NOTE — PROCEDURES
Radiology Post-Procedure Note    Pre Op Diagnosis: Discitis    Post Op Diagnosis: Same    Procedure: T9-T10 disc biopsy     Procedure performed by: Joe Chen MD    Written Informed Consent Obtained: Yes  Specimen Removed: YES 5 x 18 gauge cores  Estimated Blood Loss: Minimal    Findings:   Under fluoro guidance, 17/18 gauge biopsy system advaced via right paraspinal approach to the T9-T10 disc space. No complications.    Patient tolerated procedure well.    Joe Chen M.D.  Diagnostic and Interventional Radiologist  Department of Radiology  Pager: [phone]

## 2020-10-16 NOTE — PROGRESS NOTES
[facility]  Neurosurgery  Progress Note    Subjective:     History of Present Illness: Patient is a 70 year old F with a PMHx of Alzheimer's dz, HTN, HLD, CAD, YOVANI, and morbid obesity who was recently hospitalized x2 for MRSA bacteremia complicated by pneumonia and possible UTI, treated with linozolid x8 days and cipro and discharged 10/1. She was admitted to [facility] 10/8 from ED, presenting for excruciating flank pain x 24 hours. Ct renal stone study negative, but revealed T9-T10 discitis. Non-contrast MRI T spine prior to transfer revealed T9-T10 discitis/osteomyelitis with vertebral bone erosion and possible adjacent soft tissue abscesses. Patient transfer 10/8, began IV vanc 10/8 and IV rocephin 10/9. On neurosx consult exam, patient is awake alert and cooperative, AOx4. 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 Patient reports nonambulatory at baseline, denies changes to her activity other than pain limitation. Neurologically intact on PEx, no evidence of motor/sensory deficit.           Post-Op Info:  * No surgery found *         Interval History: NAEON. AFVSS. Resting in bed in no acute distress, on oxygen via NC. Exam remains stable. Patient is alert and oriented. Denies chest pain, SOB, weakness, numbness, paresthesias. She states she has paresthesias to the bottom of her right foot towards the heel.     Medications:  Continuous Infusions:  Scheduled Meds:   aspirin  81 mg Oral Daily    bisacodyL  5 mg Oral Daily    cefTRIAXone (ROCEPHIN) IVPB  2 g Intravenous Q24H    donepeziL  10 mg Oral QHS    DULoxetine  60 mg Oral Daily    enoxaparin  40 mg Subcutaneous Q12H    isosorbide mononitrate  60 mg Oral Daily    lisinopriL  2.5 mg  Oral Daily    metoprolol succinate  50 mg Oral Daily    miconazole nitrate 2%   Topical (Top) BID    pantoprazole  40 mg Oral Before breakfast    potassium bicarbonate  50 mEq Oral Once    pravastatin  40 mg Oral QHS    vancomycin (VANCOCIN) IVPB  1,750 mg Intravenous Q24H     PRN Meds:albuterol-ipratropium, bisacodyL, glucose, glucose, HYDROcodone-acetaminophen, HYDROmorphone, melatonin, ondansetron, prochlorperazine, sodium chloride 0.9%, Pharmacy to dose Vancomycin consult **AND** vancomycin - pharmacy to dose       Objective:     Weight: (!) 137 kg (302 lb)  Body mass index is 43.33 kg/m².  Vital Signs (Most Recent):  Temp: 98.2 °F (36.8 °C) (10/16/20 0936)  Pulse: 64 (10/16/20 0936)  Resp: 15 (10/16/20 1009)  BP: (!) 171/78 (10/16/20 0936)  SpO2: (!) 94 % (10/16/20 0936) Vital Signs (24h Range):  Temp:  [96.1 °F (35.6 °C)-98.2 °F (36.8 °C)] 98.2 °F (36.8 °C)  Pulse:  [60-65] 64  Resp:  [15-20] 15  SpO2:  [85 %-97 %] 94 %  BP: (138-181)/(65-78) 171/78                     Female External Urinary Catheter 10/07/20 1700 (Active)   Skin reddened;breakdown;perineum cleansed w/ soap and water 10/15/20 2000   Tolerance no signs/symptoms of discomfort 10/15/20 2000   Suction Continuous suction at 70 mmHg 10/15/20 2000   Date of last wick change 10/15/20 10/15/20 2000   Time of last wick change 1800 10/15/20 2000   Output (mL) 350 mL 10/10/20 0600       Neurosurgery Physical Exam    General: obese, no distress.   Head: normocephalic, atraumatic  Neck: No tracheal deviation.   Neurologic: Alert and oriented. Thought content appropriate.  GCS: Motor: 6/Verbal: 5/Eyes: 4 GCS Total: 15  Mental Status: Awake, Alert, Oriented x 4  Language: No aphasia  Speech: No dysarthria  Cranial nerves: face symmetric, tongue midline, CN II-XII grossly intact.   Eyes: pupils equal, round, reactive to light with accomodation, EOMI.  Ears: No drainage.   Pulmonary: normal respirations, no signs of respiratory distress, nasal cannula in  place.   Sensory: intact to light touch throughout  Motor Strength: Moves all extremities spontaneously with good tone. Proximal BLE strength exam limited 2/2 body habitus and bed positioning. Grossly full strength upper and lower extremities. No abnormal movements seen.     Strength  Deltoids Triceps Biceps Wrist Extension Wrist Flexion Hand    Upper: R 5/5 5/5 5/5 5/5 5/5 5/5    L 5/5 5/5 5/5 5/5 5/5 5/5     Iliopsoas Quadriceps Knee  Flexion Tibialis  anterior Gastro- cnemius EHL   Lower: R 5/5 5/5 5/5 5/5 5/5 5/5    L 5/5 5/5 5/5 5/5 5/5 5/5     Clonus: absent  Skin: Skin is warm, dry and intact.      Significant Labs:  Recent Labs   Lab 10/15/20  0333 10/15/20  1641 10/16/20  0520   GLU 99 129* 87    140 139   K 2.9* 3.0* 3.9   CL 93* 95 95   CO2 35* 38* 34*   BUN 7* 7* 6*   CREATININE 0.8 0.7 0.7   CALCIUM 9.8 9.7 10.0     Recent Labs   Lab 10/15/20  0333 10/16/20  0520   WBC 10.12 7.81   HGB 10.4* 10.6*   HCT 35.6* 36.7*   * 355*     Recent Labs   Lab 10/15/20  0920   INR 1.1     Microbiology Results (last 7 days)     Procedure Component Value Units Date/Time    Blood culture [[id number]] Collected: 10/08/20 2345    Order Status: Completed Specimen: Blood Updated: 10/14/20 0612     Blood Culture, Routine No growth after 5 days.    Blood culture [[id number]] Collected: 10/08/20 2344    Order Status: Completed Specimen: Blood Updated: 10/14/20 0612     Blood Culture, Routine No growth after 5 days.        Recent Lab Results       10/16/20  0545   10/16/20  0520   10/15/20  1641   10/15/20  1603        TB Induration 48 - 72 hr read 0           Albumin   2.1         Alkaline Phosphatase   88         ALT   6         Anion Gap   10 7       Aniso   Slight         AST   14  Comment:  *Result may be interfered by visible hemolysis         Baso #   0.03         Basophil%   0.4         BILIRUBIN TOTAL   0.2  Comment:  For infants and newborns, interpretation of results should be based  on gestational age,  weight and in agreement with clinical  observations.  Premature Infant recommended reference ranges:  Up to 24 hours.............<8.0 mg/dL  Up to 48 hours............<12.0 mg/dL  3-5 days..................<15.0 mg/dL  6-29 days.................<15.0 mg/dL           BUN, Bld   6 7       Calcium   10.0 9.7       Chloride   95 95       CO2   34 38       Creatinine   0.7 0.7       Differential Method   Automated         eGFR if    >60.0 >60.0       eGFR if non    >60.0  Comment:  Calculation used to obtain the estimated glomerular filtration  rate (eGFR) is the CKD-EPI equation.    >60.0  Comment:  Calculation used to obtain the estimated glomerular filtration  rate (eGFR) is the CKD-EPI equation.          Eos #   0.2         Eosinophil%   2.0         Glucose   87 129       Gran # (ANC)   4.8         Gran%   61.5         Hematocrit   36.7         Hemoglobin   10.6         Hypo   Occasional         Immature Grans (Abs)   0.03  Comment:  Mild elevation in immature granulocytes is non specific and   can be seen in a variety of conditions including stress response,   acute inflammation, trauma and pregnancy. Correlation with other   laboratory and clinical findings is essential.           Immature Granulocytes   0.4         Lymph #   1.8         Lymph%   23.0         MCH   27.2         MCHC   28.9         MCV   94         Mono #   1.0         Mono%   12.7         MPV   10.5         nRBC   0         Ovalocytes   Occasional         Platelet Estimate   Appears normal  Comment:  PLT CLUMPS SEEN ON SMEAR         Platelets   355         Potassium   3.9 3.0       PROTEIN TOTAL   7.2         RBC   3.90         RDW   15.6         Sodium   139 140       Target Cells   Occasional         Vancomycin-Trough       15.6     WBC   7.81             All pertinent labs from the last 24 hours have been reviewed.    Significant Diagnostics:  I have reviewed all pertinent imaging results/findings within the past  24 hours.    Assessment/Plan:     * Discitis  Martina Betancourt is a 70 y.o. female with recent MRSA bacteremia who presents with new onselt back pain, found to haveT9-T10 discitis with bone erosion and possible adjacent soft tissue abscesses.     Neurologically intact on exam.     - All labs and imaging personally reviewed  - q4 neuro checks  - MRI w wo contrast of thoracic and lumbar spine shows discitis/osteomyelitis at T9-T10 with T8-T10 phlegmon. There is a bilateral spondylolysis at L5 and central canal narrowing from L3-L5.   - CT thoracic/lumbar spine shows T9-T10 discitis/osteomyelitis. No retropulsion, no focal kyphosis.   - No emergent neurosurgical intervention indicated at this time. Plan for IR biopsy of T9-T10 today, 10/16. Will follow up cultures  - ID: Recent hospitalization for MRSA bacteremia, likely infectious cause. Blood cultures 10/8 NGTD. WBC wnl, afebrile.    - ID recommending vanc + rocephin anticipated for 6-8 weeks.   - Constipation: Recommend escalate bowel regimen for c/o constipation   - Bilateral pleural effusions: seen on CT lumbar spine. On 3L NC. ID recommending pulm consult for diagnostic/therapeutic thoracentesis.  - Please notify neurosx for any changes in neuro exam. Will continue to follow closely.               Carmen Metzger PA-C  Neurosurgery  [facility]

## 2020-10-16 NOTE — ASSESSMENT & PLAN NOTE
Martina Betancourt is a 70 y.o. female with recent MRSA bacteremia who presents with new onselt back pain, found to haveT9-T10 discitis with bone erosion and possible adjacent soft tissue abscesses.     Neurologically intact on exam.     - All labs and imaging personally reviewed  - q4 neuro checks  - MRI w wo contrast of thoracic and lumbar spine shows discitis/osteomyelitis at T9-T10 with T8-T10 phlegmon. There is a bilateral spondylolysis at L5 and central canal narrowing from L3-L5.   - CT thoracic/lumbar spine shows T9-T10 discitis/osteomyelitis. No retropulsion, no focal kyphosis.   - No emergent neurosurgical intervention indicated at this time. Plan for IR biopsy of T9-T10 today, 10/16. Will follow up cultures  - ID: Recent hospitalization for MRSA bacteremia, likely infectious cause. Blood cultures 10/8 NGTD. WBC wnl, afebrile.    - ID recommending vanc + rocephin anticipated for 6-8 weeks.   - Constipation: Recommend escalate bowel regimen for c/o constipation   - Bilateral pleural effusions: seen on CT lumbar spine. On 3L NC. ID recommending pulm consult for diagnostic/therapeutic thoracentesis.  - Please notify neurosx for any changes in neuro exam. Will continue to follow closely.

## 2020-10-16 NOTE — PLAN OF CARE
T9-T10 aspiration completed, pt tolerated well. No apparent distress noted. Mepore applied CDI. Labs collected and sent. Specimen collected and sent to pathology. Pt to be transferred to [facility], reprot given at bedside. Report called to LORAINE Lugo.

## 2020-10-16 NOTE — PT/OT/SLP PROGRESS
Physical Therapy      Patient Name:  Martina Betancourt   MRN:  [id number]  Admitting Diagnosis:  Discitis   Recent Surgery: * No surgery found *    Admit Date: 10/8/2020  Length of Stay: 8 days    Patient not seen today due to Unavailable (Comment)(Pt MEKA for disc aspiration). Pt to  IR disc aspiration -pain lumbar injection. PT orders discharged as patient with surgery/procedure on this date, will require new PT orders post-op. Will follow-up upon receiving new PT orders post-op.    Gissell Holloway, PT, DPT  10/16/2020

## 2020-10-16 NOTE — PT/OT/SLP PROGRESS
Occupational Therapy      Patient Name:  Martina Betancourt   MRN:  [id number]    OT orders discharged as patient with surgery on this date, will require new OT orders.     Patient not seen today secondary to patient scheduled for IR dis aspiration -pain lumbar injection. Will follow-up upon receiving new OT orders post-op.    Neetu Ramirez, OT  10/16/2020

## 2020-10-16 NOTE — SUBJECTIVE & OBJECTIVE
Interval History: NAEON. Afebrile. WBC WNL. Back pain somewhat improved. Still very weak.   The patient denies any recent fever, chills, or sweats. Planning to be discharged to SNF. Called and updated patient's daughter.      Review of Systems   Constitutional: Negative for chills, diaphoresis and fever.   Respiratory: Positive for shortness of breath. Negative for cough.    Cardiovascular: Negative for chest pain and palpitations.   Gastrointestinal: Negative for constipation and nausea.   Genitourinary: Positive for flank pain. Negative for difficulty urinating and dysuria.   Musculoskeletal: Positive for back pain. Negative for arthralgias and neck pain.   Skin: Negative for rash and wound.   Neurological: Positive for weakness. Negative for numbness.   Psychiatric/Behavioral: Negative for agitation and confusion. The patient is not nervous/anxious.      Objective:     Vital Signs (Most Recent):  Temp: 98.2 °F (36.8 °C) (10/16/20 0936)  Pulse: 64 (10/16/20 0936)  Resp: 15 (10/16/20 1009)  BP: (!) 171/78 (10/16/20 0936)  SpO2: (!) 94 % (10/16/20 0936) Vital Signs (24h Range):  Temp:  [96.1 °F (35.6 °C)-98.2 °F (36.8 °C)] 98.2 °F (36.8 °C)  Pulse:  [60-65] 64  Resp:  [15-20] 15  SpO2:  [85 %-97 %] 94 %  BP: (138-181)/(65-78) 171/78     Weight: (!) 137 kg (302 lb)  Body mass index is 43.33 kg/m².    Estimated Creatinine Clearance: 110 mL/min (based on SCr of 0.7 mg/dL).    Physical Exam  Constitutional:       General: She is not in acute distress.     Appearance: She is well-developed. She is obese. She is not diaphoretic.   HENT:      Head: Normocephalic and atraumatic.   Eyes:      General: No scleral icterus.     Conjunctiva/sclera: Conjunctivae normal.   Cardiovascular:      Rate and Rhythm: Normal rate and regular rhythm.   Pulmonary:      Effort: Pulmonary effort is normal. No respiratory distress.   Abdominal:      General: There is no distension.      Palpations: Abdomen is soft.      Tenderness: There is no  abdominal tenderness. There is no guarding.   Musculoskeletal:         General: Tenderness present.   Skin:     General: Skin is warm and dry.      Findings: No rash.   Neurological:      Mental Status: She is alert and oriented to person, place, and time.      Motor: Weakness present.   Psychiatric:         Mood and Affect: Mood normal.         Behavior: Behavior normal.         Thought Content: Thought content normal.         Significant Labs: All pertinent labs within the past 24 hours have been reviewed.    Significant Imaging: I have reviewed all pertinent imaging results/findings within the past 24 hours.   CT Thoracic Spine Without Contrast [[id number]] (Abnormal) Resulted: 10/13/20 0854   Order Status: Completed Updated: 10/13/20 0856   Narrative:     EXAMINATION:   CT THORACIC SPINE WITHOUT CONTRAST; CT LUMBAR SPINE WITHOUT CONTRAST     CLINICAL HISTORY:   eval discitis;     TECHNIQUE:   Low dose axial images, sagittal and coronal reformations were performed though the thoracic and lumbar spine.  Contrast was not administered.     COMPARISON:   MRI thoracic and lumbar spine 10/12/2020     FINDINGS:   Thoracic spinal alignment within normal limits.  Destructive changes centered about the T9-T10 intervertebral disc and adjacent endplates concerning for T9-T10 discitis/osteomyelitis.  Resulting moderate vertebral body height loss at T9 and T10 identified.  Increased soft tissue density about the affected vertebral bodies keeping with known multilocular paravertebral abscesses, better evaluated on MRI 10/12/2020.  Osseous fragments within the disc space abut and narrow the ventral aspect of the thecal sac and result in severe bilateral foraminal narrowing.     Vertebral body heights at the unaffected levels are maintained.  Mild multilevel intervertebral disc height loss.  Mild thoracic spondylosis identified, most notable in the upper thoracic spine resulting in mild left, moderate right neural foraminal  narrowing at T2-T3, moderate right neural foraminal narrowing T3-T4, and mild right neural foraminal narrowing at T4-T5.  Multilevel circumferential disc bulge is identified.     Bilateral L5 pars defects with grade 2 anterolisthesis of L5 on S1.  Grade 1 anterolisthesis of L3 on L4.  No acute fracture.  Vertebral body heights are maintained.  There is multilevel intervertebral disc height loss most notable at L4-L5 and L5-S1.  Prominent multilevel anterior marginal osteophytosis.     T12-L1: No significant disc bulge, neural foraminal narrowing, or spinal canal stenosis     L1-L2: Circumferential disc bulge, ligamentum flavum thickening, and facet hypertrophy resulting in mild bilateral neural foraminal narrowing, right greater than left.  No spinal canal stenosis.     L2-L3: Circumferential disc bulge, advanced facet hypertrophy and ligamentum flavum thickening resulting in moderate spinal canal stenosis and mild right neural foraminal narrowing.     L3-L4: Circumferential disc bulge, advanced facet hypertrophy, and ligamentum flavum thickening resulting in severe spinal canal stenosis and mild right neural foraminal narrowing.     L4-L5: Circumferential disc bulge, advanced facet hypertrophy, and ligamentum flavum thickening resulting in severe spinal canal stenosis and mild right neural foraminal narrowing.     L5-S1: Uncovering of the intervertebral disc and advanced facet hypertrophy resulting in severe bilateral neural foraminal narrowing.  No spinal canal stenosis.     Limited evaluation of the intrathoracic structures reveals aortic annular calcification and coronary artery atherosclerosis.  Bilateral pleural effusions identified, larger on the left with compressive atelectasis/volume loss of adjacent lung parenchyma.  Moderate-large hiatal hernia.  Stable nodular thickening of the right adrenal gland, unchanged compared to prior studies.     Limited evaluation of abdominopelvic structures reveals operative  change of cholecystectomy, severe aortic atherosclerosis into the branch vessels, mild paraspinal muscular atrophy, and advanced sigmoid diverticulosis.    Impression:       Findings above consistent with T9-T10 discitis/osteomyelitis with multilocular paravertebral abscesses, better evaluated on MRI 10/12/2020.  No significant retropulsion osseous fragments into the canal.     Multilevel lumbar spondylosis resulting in moderate to severe spinal canal stenosis from L2-L3 through L4-L5 and severe bilateral neural foraminal narrowing at L5-S1.     Mild multilevel thoracic spondylosis, detailed above.     Bilateral pleural effusions, larger on the left with compressive atelectasis/volume loss of adjacent lung parenchyma.     Moderate-large size hiatal hernia     Multi-vessel coronary artery atherosclerosis.     Additional findings detailed above.     This report was flagged in Epic as abnormal.     Electronically signed by resident: Oscar Castro MD   Date: 10/13/2020   Time: 07:49     Electronically signed by: Israel Diaz MD   Date: 10/13/2020   Time: 08:54   CT Lumbar Spine Without Contrast [[id number]] (Abnormal) Resulted: 10/13/20 0854   Order Status: Completed Updated: 10/13/20 0856   Narrative:     EXAMINATION:   CT THORACIC SPINE WITHOUT CONTRAST; CT LUMBAR SPINE WITHOUT CONTRAST     CLINICAL HISTORY:   eval discitis;     TECHNIQUE:   Low dose axial images, sagittal and coronal reformations were performed though the thoracic and lumbar spine.  Contrast was not administered.     COMPARISON:   MRI thoracic and lumbar spine 10/12/2020     FINDINGS:   Thoracic spinal alignment within normal limits.  Destructive changes centered about the T9-T10 intervertebral disc and adjacent endplates concerning for T9-T10 discitis/osteomyelitis.  Resulting moderate vertebral body height loss at T9 and T10 identified.  Increased soft tissue density about the affected vertebral bodies keeping with known multilocular  paravertebral abscesses, better evaluated on MRI 10/12/2020.  Osseous fragments within the disc space abut and narrow the ventral aspect of the thecal sac and result in severe bilateral foraminal narrowing.     Vertebral body heights at the unaffected levels are maintained.  Mild multilevel intervertebral disc height loss.  Mild thoracic spondylosis identified, most notable in the upper thoracic spine resulting in mild left, moderate right neural foraminal narrowing at T2-T3, moderate right neural foraminal narrowing T3-T4, and mild right neural foraminal narrowing at T4-T5.  Multilevel circumferential disc bulge is identified.     Bilateral L5 pars defects with grade 2 anterolisthesis of L5 on S1.  Grade 1 anterolisthesis of L3 on L4.  No acute fracture.  Vertebral body heights are maintained.  There is multilevel intervertebral disc height loss most notable at L4-L5 and L5-S1.  Prominent multilevel anterior marginal osteophytosis.     T12-L1: No significant disc bulge, neural foraminal narrowing, or spinal canal stenosis     L1-L2: Circumferential disc bulge, ligamentum flavum thickening, and facet hypertrophy resulting in mild bilateral neural foraminal narrowing, right greater than left.  No spinal canal stenosis.     L2-L3: Circumferential disc bulge, advanced facet hypertrophy and ligamentum flavum thickening resulting in moderate spinal canal stenosis and mild right neural foraminal narrowing.     L3-L4: Circumferential disc bulge, advanced facet hypertrophy, and ligamentum flavum thickening resulting in severe spinal canal stenosis and mild right neural foraminal narrowing.     L4-L5: Circumferential disc bulge, advanced facet hypertrophy, and ligamentum flavum thickening resulting in severe spinal canal stenosis and mild right neural foraminal narrowing.     L5-S1: Uncovering of the intervertebral disc and advanced facet hypertrophy resulting in severe bilateral neural foraminal narrowing.  No spinal canal  stenosis.     Limited evaluation of the intrathoracic structures reveals aortic annular calcification and coronary artery atherosclerosis.  Bilateral pleural effusions identified, larger on the left with compressive atelectasis/volume loss of adjacent lung parenchyma.  Moderate-large hiatal hernia.  Stable nodular thickening of the right adrenal gland, unchanged compared to prior studies.     Limited evaluation of abdominopelvic structures reveals operative change of cholecystectomy, severe aortic atherosclerosis into the branch vessels, mild paraspinal muscular atrophy, and advanced sigmoid diverticulosis.    Impression:       Findings above consistent with T9-T10 discitis/osteomyelitis with multilocular paravertebral abscesses, better evaluated on MRI 10/12/2020.  No significant retropulsion osseous fragments into the canal.     Multilevel lumbar spondylosis resulting in moderate to severe spinal canal stenosis from L2-L3 through L4-L5 and severe bilateral neural foraminal narrowing at L5-S1.     Mild multilevel thoracic spondylosis, detailed above.     Bilateral pleural effusions, larger on the left with compressive atelectasis/volume loss of adjacent lung parenchyma.     Moderate-large size hiatal hernia     Multi-vessel coronary artery atherosclerosis.     Additional findings detailed above.     This report was flagged in Epic as abnormal.     Electronically signed by resident: Oscar Castro MD   Date: 10/13/2020   Time: 07:49     Electronically signed by: Israel Diaz MD   Date: 10/13/2020   Time: 08:54   MRI Thoracic Spine W WO Cont [[id number]] (Abnormal) Resulted: 10/12/20 1620   Order Status: Completed Updated: 10/12/20 1623   Narrative:     EXAMINATION:   MRI THORACIC SPINE W WO CONTRAST     CLINICAL HISTORY:   eval discitis;     TECHNIQUE:   Multiplanar, multisequence MR images were acquired from the cervicothoracic junction to the thoracolumbar junction with and without Gadavist contrast.      COMPARISON:   MRI thoracic spine without contrast 10/08/2020. CTA chest 08/09/2020.     FINDINGS:   Alignment: Normal.     Vertebrae and discs: Fluid signal within the T9-T10 disc space with destructive changes and enhancement of the adjacent vertebral body endplates along with marrow replacement, compatible with discitis and osteomyelitis. There is extension into the posterior spinal elements. There is mild T9 and T10 vertebral body height loss.  Redemonstration of multilocular paravertebral abscesses adjacent to the T9-T10 disc space.  The largest collection on the right measures 3.3 x 1.6 cm.  The collection on the left measures 1.6 x 0.8 cm.     Cord: There is thickening of the dura at the T8-T10 level with enhancement compatible with epidural phlegmon, resulting in moderate narrowing of the thecal sac.  There is associated nerve root enhancement, which may be seen with arachnoiditis.  No definite spinal cord edema.     Degenerative findings:     Mild degenerative changes and disc bulges in the upper thoracic spine, most prominent at T5-T6, where circumferential disc bulge results in mild compression of the thecal sac.  There is mild bilateral neural foraminal narrowing at T2-T3.     Paraspinal muscles & soft tissues: Bilateral lower lung zone airspace consolidation and complex pleural effusions with enhancement.  Note made of hiatal hernia.    Impression:       Discitis/osteomyelitis at the T9-T10 level with multilocular paravertebral abscesses similar to prior exam.     T8-T10 epidural phlegmon results in moderate narrowing of the thecal sac. There is associated nerve root enhancement, which may be seen with arachnoiditis.     Bilateral complex pleural effusions with enhancement.  Empyema is a consideration.  Consider further evaluation with contrast enhanced chest CT.     Multilevel mild degenerative changes and disc bulges in the thoracic spine, most prominent at T5-T6.     Additional findings as above.      This report was flagged in Epic as abnormal.     Electronically signed by resident: Efrem Urena   Date: 10/12/2020   Time: 14:05     Electronically signed by: Tiago Luna MD   Date: 10/12/2020   Time: 16:20   MRI Lumbar Spine W WO Cont [[id number]] Resulted: 10/12/20 1330   Order Status: Completed Updated: 10/12/20 1333   Narrative:     EXAMINATION:   MRI LUMBAR SPINE W WO CONTRAST     CLINICAL HISTORY:   eval discitis;     TECHNIQUE:   Multiplanar, multisequence MR images were acquired from the thoracolumbar junction to the sacrum without and with 10 mL Gadavist intravenous contrast.     COMPARISON:   01/25/2016     FINDINGS:   Alignment: Grade 2 anterolisthesis at L5-S1 secondary to bilateral L5 spondylolysis.  Grade 1 anterolisthesis noted at L3-L4.     Vertebrae: There are multilevel degenerative endplate changes.  No fracture or infiltrative process.     Discs: There is moderate to severe disc height loss throughout the lower lumbar spine.  Mild fluid signal noted at L3-S1, likely degenerative.     Cord: Normal.  Conus terminates at L1.     Enhancement: Mild enhancement noted about the arthritic L5-S1 facet joints, likely reactive.  No nerve root enhancement.     Degenerative findings:     T12-L1: No spinal canal stenosis or neural foraminal narrowing.     L1-L2: Mild facet arthropathy resulting in mild bilateral neural foraminal narrowing.     L2-L3: Circumferential disc bulge and moderate facet arthropathy result in moderate spinal canal stenosis and mild right neural foraminal narrowing.     L3-L4: Circumferential disc bulge and severe facet arthropathy result in severe spinal canal stenosis and mild right neural foraminal narrowing.     L4-L5: Circumferential disc bulge and moderate facet arthropathy result in moderate spinal canal stenosis and mild bilateral neural foraminal narrowing.     L5-S1: Malalignment with moderate facet arthropathy result in severe bilateral neural foraminal  narrowing.     Paraspinal muscles & soft tissues: Moderate paraspinal muscle atrophy.    Impression:       No evidence for spondylodiscitis.     Bilateral L5 spondylolysis with grade 2 anterolisthesis.     Multilevel degenerative changes of the lumbar spine detailed above.  Moderate to severe spinal canal stenosis noted at L2-L5.  Severe neural foraminal narrowing noted at L5-S1.       Electronically signed by: Tiago Luna MD   Date: 10/12/2020   Time: 13:30   Imaging History    2020  Date Procedure Name Status Accession Number Location   10/13/20 01:04 AM CT Thoracic Spine Without Contrast Final [id number] [facility]   10/13/20 01:04 AM CT Lumbar Spine Without Contrast Final [id number] [facility]   10/12/20 01:21 PM MRI Thoracic Spine W WO Cont Final [id number] [facility]   10/12/20 01:20 PM MRI Lumbar Spine W WO Cont Final [id number] [facility]   10/08/20 12:37 PM MRI Thoracic Spine Without Contrast Final [id number] [facility]   10/07/20 02:35 PM CT Renal Stone Study ABD Pelvis WO Final [id number] [facility]   10/07/20 02:35 PM X-Ray Chest AP Portable Final [id number] [facility]   09/23/20 01:18 PM X-Ray Chest 1 View Final [id number] [facility]   09/23/20 12:00 AM CARDIAC MONITORING STRIPS Final     10/12/20 09:30 AM Echo Color Flow Doppler? Yes Final

## 2020-10-16 NOTE — PLAN OF CARE
POC updated & reviewed with patient, verbalized understanding. Questions regarding POC were encouraged & addressed with patient. VSS, see flow-sheets. Patient is AAO x 4 at this time. IR aspiration was postponed and rescheduled for 10/16/20. NPO since midnight. Patient would benefit from PICC or Midline placement for around the lock IV antibiotics.   Fall/safety precautions maintained, no signs of injury noted during shift. Patient was repositioned for comfort, bed locked in low position, side rails up x 3, bed alarm set, and call light within reach. Patient was instructed to call staff for mobility, verbalized understanding. WCTM.       Problem: Adult Inpatient Plan of Care  Goal: Plan of Care Review  Outcome: Ongoing, Progressing  Goal: Absence of Hospital-Acquired Illness or Injury  Outcome: Ongoing, Progressing  Goal: Optimal Comfort and Wellbeing  Outcome: Ongoing, Progressing     Problem: Infection  Goal: Infection Symptom Resolution  Outcome: Ongoing, Progressing     Problem: Skin Injury Risk Increased  Goal: Skin Health and Integrity  Outcome: Ongoing, Progressing

## 2020-10-16 NOTE — PT/OT/SLP PROGRESS
Physical Therapy  Pt Not Seen    Patient Name:  Martina Betancourt   MRN:  [id number]    Patient not seen today secondary to pt Unavailable (Comment)(Pt MEKA for disc aspiration).     Viridiana Bermudez, PTA  10/16/2020

## 2020-10-16 NOTE — PROGRESS NOTES
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 Also noted to have bilateral complex pleural effusions. Spine infection likely hematogenous from under treated bacteremia. Neurosurgery has been consulted - unfortunately no plans for surgical intervention at this time. IR is planning for IR aspiration tomorrow. On Vancomycin and Ceftriaxone. Afebrile. HDS. Repeat blood cx sterile.    Neurosurgery does not plan for surgical intervention,  IR drainage of abscesses and send fluid for gram stain, aerobic, anaerobic, AFB and fungal cultures planned.  Stable non septic    Plan  - Continue Vancomycin and Ceftriaxone. Vanc trough goal 15-20.  - Send IR cultures - NSGY team made aware  - Monitor respiratory status closely. Consider pulm consult for diagnostic/therapeutic thoracentesis.   - Plan for 6 - 8 weeks of IV antibiotics with repeat MRI at midpoint of treatment as no surgical drainage undertaken  - ID will follow.       MRSA bacteremia   See below        Anticipated Disposition: tbd    Thank you for your consult. I will follow-up with patient. Please contact us if you have any additional questions.    LAYO Kennedy  Infectious Disease  [facility]    Subjective:     Principal Problem:Discitis    HPI: Patient is a 70-year-old female with a history of recent hospitalization x2 including a Staph aureus bacteremia and then a subsequent pneumonia  who at the last hospitalization about 1-2 weeks ago made a significant improvement with antibiotics return home with her daughter.  The patient reports she was doing well until about 24 hours ago began having excruciating flank pain.  In the ED she had imaging studies that showed a diskitis at the bottom thoracic spine levels and the concern of a possible epidural abscess/vertebral infection was entertained.  The case was discussed with an outside facility that had neurosurgical services and they felt that an MRI was needed before transfer.  She was transferred from [facility].  The patient has some dementia and during her last few hospitalizations was placed do not resuscitate because of her deteriorating condition.     Per dc summary from last hospitalization patient was treated from 9/23 to 10/1 with zyvox then dc'd.  Blood cultures cleared on 9/26.  And repeats 10/8 are NGTD.    MRI T spine obtained showing:  There is fluid signal within the T9-T10 disc space along with destructive changes of the adjacent vertebral body endplates and associated marrow edema, suggestive of discitis and osteomyelitis.  Small ovoid areas of fluid signal within the soft tissues along the lateral aspects of the T9-T10 disc space bilaterally could reflect abscesses.  The collection on the left measures 2.7 x 1.5 cm and the collection on the right measures 3.8 x 2.4 cm.  Mild degenerative related signal change is seen along the endplates of multiple other thoracic bodies.    CT renal stone study shows:  Destructive changes at the endplates of the T9 and T10 bodies, new when compared to the chest CT from 08/09/2020 and suspicious for discitis.  Mild-to-moderate right hydronephrosis along with multiple calcifications in the vicinity of the distal right ureter, most of which likely reflect calcified phleboliths.  One of these calcifications could represent a ureteral stone.  Small bilateral pleural effusions with adjacent  atelectasis/infiltrates.    Patient afebrile and WBC 13s.  NSGY consulted. On Vanc and Zosyn.  ID consulted for abx recs.  Patient co of sever back pain.  She denies dysuria, fever, chills and sweats.  Interval History: NAEON. Afebrile. WBC WNL. Back pain somewhat improved. Going for aspiration today.  The patient denies any recent fever, chills, or sweats.      Review of Systems   Constitutional: Negative for chills, diaphoresis and fever.   Respiratory: Positive for shortness of breath. Negative for cough.    Cardiovascular: Negative for chest pain and palpitations.   Gastrointestinal: Negative for constipation and nausea.   Genitourinary: Positive for flank pain. Negative for difficulty urinating and dysuria.   Musculoskeletal: Positive for back pain. Negative for arthralgias and neck pain.   Skin: Negative for rash and wound.   Neurological: Negative for weakness and numbness.   Psychiatric/Behavioral: Negative for agitation and confusion. The patient is not nervous/anxious.      Objective:     Vital Signs (Most Recent):  Temp: 96.5 °F (35.8 °C) (10/15/20 1924)  Pulse: 62 (10/15/20 1924)  Resp: 18 (10/15/20 1924)  BP: 138/65 (10/15/20 1924)  SpO2: 95 % (10/15/20 1924) Vital Signs (24h Range):  Temp:  [96.5 °F (35.8 °C)-98 °F (36.7 °C)] 96.5 °F (35.8 °C)  Pulse:  [62-65] 62  Resp:  [10-21] 18  SpO2:  [92 %-95 %] 95 %  BP: (138-161)/(65-72) 138/65     Weight: (!) 137 kg (302 lb)  Body mass index is 43.33 kg/m².    Estimated Creatinine Clearance: 110 mL/min (based on SCr of 0.7 mg/dL).    Physical Exam  Constitutional:       General: She is not in acute distress.     Appearance: She is well-developed. She is not diaphoretic.   HENT:      Head: Normocephalic and atraumatic.   Eyes:      General: No scleral icterus.     Conjunctiva/sclera: Conjunctivae normal.   Cardiovascular:      Rate and Rhythm: Normal rate and regular rhythm.   Pulmonary:      Effort: Pulmonary effort is normal. No respiratory distress.       Comments: O2 via NC  Abdominal:      General: There is no distension.      Palpations: Abdomen is soft.      Tenderness: There is no abdominal tenderness. There is no guarding.   Musculoskeletal:         General: No tenderness.   Skin:     General: Skin is warm and dry.      Findings: No rash.   Neurological:      Mental Status: She is alert and oriented to person, place, and time.   Psychiatric:         Mood and Affect: Mood normal.         Behavior: Behavior normal.         Thought Content: Thought content normal.         Significant Labs: All pertinent labs within the past 24 hours have been reviewed.    Significant Imaging: I have reviewed all pertinent imaging results/findings within the past 24 hours.   CT Thoracic Spine Without Contrast [[id number]] (Abnormal) Resulted: 10/13/20 0854   Order Status: Completed Updated: 10/13/20 0856   Narrative:     EXAMINATION:   CT THORACIC SPINE WITHOUT CONTRAST; CT LUMBAR SPINE WITHOUT CONTRAST     CLINICAL HISTORY:   eval discitis;     TECHNIQUE:   Low dose axial images, sagittal and coronal reformations were performed though the thoracic and lumbar spine.  Contrast was not administered.     COMPARISON:   MRI thoracic and lumbar spine 10/12/2020     FINDINGS:   Thoracic spinal alignment within normal limits.  Destructive changes centered about the T9-T10 intervertebral disc and adjacent endplates concerning for T9-T10 discitis/osteomyelitis.  Resulting moderate vertebral body height loss at T9 and T10 identified.  Increased soft tissue density about the affected vertebral bodies keeping with known multilocular paravertebral abscesses, better evaluated on MRI 10/12/2020.  Osseous fragments within the disc space abut and narrow the ventral aspect of the thecal sac and result in severe bilateral foraminal narrowing.     Vertebral body heights at the unaffected levels are maintained.  Mild multilevel intervertebral disc height loss.  Mild thoracic spondylosis identified, most  notable in the upper thoracic spine resulting in mild left, moderate right neural foraminal narrowing at T2-T3, moderate right neural foraminal narrowing T3-T4, and mild right neural foraminal narrowing at T4-T5.  Multilevel circumferential disc bulge is identified.     Bilateral L5 pars defects with grade 2 anterolisthesis of L5 on S1.  Grade 1 anterolisthesis of L3 on L4.  No acute fracture.  Vertebral body heights are maintained.  There is multilevel intervertebral disc height loss most notable at L4-L5 and L5-S1.  Prominent multilevel anterior marginal osteophytosis.     T12-L1: No significant disc bulge, neural foraminal narrowing, or spinal canal stenosis     L1-L2: Circumferential disc bulge, ligamentum flavum thickening, and facet hypertrophy resulting in mild bilateral neural foraminal narrowing, right greater than left.  No spinal canal stenosis.     L2-L3: Circumferential disc bulge, advanced facet hypertrophy and ligamentum flavum thickening resulting in moderate spinal canal stenosis and mild right neural foraminal narrowing.     L3-L4: Circumferential disc bulge, advanced facet hypertrophy, and ligamentum flavum thickening resulting in severe spinal canal stenosis and mild right neural foraminal narrowing.     L4-L5: Circumferential disc bulge, advanced facet hypertrophy, and ligamentum flavum thickening resulting in severe spinal canal stenosis and mild right neural foraminal narrowing.     L5-S1: Uncovering of the intervertebral disc and advanced facet hypertrophy resulting in severe bilateral neural foraminal narrowing.  No spinal canal stenosis.     Limited evaluation of the intrathoracic structures reveals aortic annular calcification and coronary artery atherosclerosis.  Bilateral pleural effusions identified, larger on the left with compressive atelectasis/volume loss of adjacent lung parenchyma.  Moderate-large hiatal hernia.  Stable nodular thickening of the right adrenal gland, unchanged  compared to prior studies.     Limited evaluation of abdominopelvic structures reveals operative change of cholecystectomy, severe aortic atherosclerosis into the branch vessels, mild paraspinal muscular atrophy, and advanced sigmoid diverticulosis.    Impression:       Findings above consistent with T9-T10 discitis/osteomyelitis with multilocular paravertebral abscesses, better evaluated on MRI 10/12/2020.  No significant retropulsion osseous fragments into the canal.     Multilevel lumbar spondylosis resulting in moderate to severe spinal canal stenosis from L2-L3 through L4-L5 and severe bilateral neural foraminal narrowing at L5-S1.     Mild multilevel thoracic spondylosis, detailed above.     Bilateral pleural effusions, larger on the left with compressive atelectasis/volume loss of adjacent lung parenchyma.     Moderate-large size hiatal hernia     Multi-vessel coronary artery atherosclerosis.     Additional findings detailed above.     This report was flagged in Epic as abnormal.     Electronically signed by resident: Oscar Castro MD   Date: 10/13/2020   Time: 07:49     Electronically signed by: Israel Diaz MD   Date: 10/13/2020   Time: 08:54   CT Lumbar Spine Without Contrast [[id number]] (Abnormal) Resulted: 10/13/20 0854   Order Status: Completed Updated: 10/13/20 0856   Narrative:     EXAMINATION:   CT THORACIC SPINE WITHOUT CONTRAST; CT LUMBAR SPINE WITHOUT CONTRAST     CLINICAL HISTORY:   eval discitis;     TECHNIQUE:   Low dose axial images, sagittal and coronal reformations were performed though the thoracic and lumbar spine.  Contrast was not administered.     COMPARISON:   MRI thoracic and lumbar spine 10/12/2020     FINDINGS:   Thoracic spinal alignment within normal limits.  Destructive changes centered about the T9-T10 intervertebral disc and adjacent endplates concerning for T9-T10 discitis/osteomyelitis.  Resulting moderate vertebral body height loss at T9 and T10 identified.  Increased  soft tissue density about the affected vertebral bodies keeping with known multilocular paravertebral abscesses, better evaluated on MRI 10/12/2020.  Osseous fragments within the disc space abut and narrow the ventral aspect of the thecal sac and result in severe bilateral foraminal narrowing.     Vertebral body heights at the unaffected levels are maintained.  Mild multilevel intervertebral disc height loss.  Mild thoracic spondylosis identified, most notable in the upper thoracic spine resulting in mild left, moderate right neural foraminal narrowing at T2-T3, moderate right neural foraminal narrowing T3-T4, and mild right neural foraminal narrowing at T4-T5.  Multilevel circumferential disc bulge is identified.     Bilateral L5 pars defects with grade 2 anterolisthesis of L5 on S1.  Grade 1 anterolisthesis of L3 on L4.  No acute fracture.  Vertebral body heights are maintained.  There is multilevel intervertebral disc height loss most notable at L4-L5 and L5-S1.  Prominent multilevel anterior marginal osteophytosis.     T12-L1: No significant disc bulge, neural foraminal narrowing, or spinal canal stenosis     L1-L2: Circumferential disc bulge, ligamentum flavum thickening, and facet hypertrophy resulting in mild bilateral neural foraminal narrowing, right greater than left.  No spinal canal stenosis.     L2-L3: Circumferential disc bulge, advanced facet hypertrophy and ligamentum flavum thickening resulting in moderate spinal canal stenosis and mild right neural foraminal narrowing.     L3-L4: Circumferential disc bulge, advanced facet hypertrophy, and ligamentum flavum thickening resulting in severe spinal canal stenosis and mild right neural foraminal narrowing.     L4-L5: Circumferential disc bulge, advanced facet hypertrophy, and ligamentum flavum thickening resulting in severe spinal canal stenosis and mild right neural foraminal narrowing.     L5-S1: Uncovering of the intervertebral disc and advanced facet  hypertrophy resulting in severe bilateral neural foraminal narrowing.  No spinal canal stenosis.     Limited evaluation of the intrathoracic structures reveals aortic annular calcification and coronary artery atherosclerosis.  Bilateral pleural effusions identified, larger on the left with compressive atelectasis/volume loss of adjacent lung parenchyma.  Moderate-large hiatal hernia.  Stable nodular thickening of the right adrenal gland, unchanged compared to prior studies.     Limited evaluation of abdominopelvic structures reveals operative change of cholecystectomy, severe aortic atherosclerosis into the branch vessels, mild paraspinal muscular atrophy, and advanced sigmoid diverticulosis.    Impression:       Findings above consistent with T9-T10 discitis/osteomyelitis with multilocular paravertebral abscesses, better evaluated on MRI 10/12/2020.  No significant retropulsion osseous fragments into the canal.     Multilevel lumbar spondylosis resulting in moderate to severe spinal canal stenosis from L2-L3 through L4-L5 and severe bilateral neural foraminal narrowing at L5-S1.     Mild multilevel thoracic spondylosis, detailed above.     Bilateral pleural effusions, larger on the left with compressive atelectasis/volume loss of adjacent lung parenchyma.     Moderate-large size hiatal hernia     Multi-vessel coronary artery atherosclerosis.     Additional findings detailed above.     This report was flagged in Epic as abnormal.     Electronically signed by resident: Oscar Castro MD   Date: 10/13/2020   Time: 07:49     Electronically signed by: Israel Diaz MD   Date: 10/13/2020   Time: 08:54   MRI Thoracic Spine W WO Cont [[id number]] (Abnormal) Resulted: 10/12/20 1620   Order Status: Completed Updated: 10/12/20 1623   Narrative:     EXAMINATION:   MRI THORACIC SPINE W WO CONTRAST     CLINICAL HISTORY:   eval discitis;     TECHNIQUE:   Multiplanar, multisequence MR images were acquired from the cervicothoracic  junction to the thoracolumbar junction with and without Gadavist contrast.     COMPARISON:   MRI thoracic spine without contrast 10/08/2020. CTA chest 08/09/2020.     FINDINGS:   Alignment: Normal.     Vertebrae and discs: Fluid signal within the T9-T10 disc space with destructive changes and enhancement of the adjacent vertebral body endplates along with marrow replacement, compatible with discitis and osteomyelitis. There is extension into the posterior spinal elements. There is mild T9 and T10 vertebral body height loss.  Redemonstration of multilocular paravertebral abscesses adjacent to the T9-T10 disc space.  The largest collection on the right measures 3.3 x 1.6 cm.  The collection on the left measures 1.6 x 0.8 cm.     Cord: There is thickening of the dura at the T8-T10 level with enhancement compatible with epidural phlegmon, resulting in moderate narrowing of the thecal sac.  There is associated nerve root enhancement, which may be seen with arachnoiditis.  No definite spinal cord edema.     Degenerative findings:     Mild degenerative changes and disc bulges in the upper thoracic spine, most prominent at T5-T6, where circumferential disc bulge results in mild compression of the thecal sac.  There is mild bilateral neural foraminal narrowing at T2-T3.     Paraspinal muscles & soft tissues: Bilateral lower lung zone airspace consolidation and complex pleural effusions with enhancement.  Note made of hiatal hernia.    Impression:       Discitis/osteomyelitis at the T9-T10 level with multilocular paravertebral abscesses similar to prior exam.     T8-T10 epidural phlegmon results in moderate narrowing of the thecal sac. There is associated nerve root enhancement, which may be seen with arachnoiditis.     Bilateral complex pleural effusions with enhancement.  Empyema is a consideration.  Consider further evaluation with contrast enhanced chest CT.     Multilevel mild degenerative changes and disc bulges in the  thoracic spine, most prominent at T5-T6.     Additional findings as above.     This report was flagged in Epic as abnormal.     Electronically signed by resident: Efrem Urena   Date: 10/12/2020   Time: 14:05     Electronically signed by: Tiago Luna MD   Date: 10/12/2020   Time: 16:20   MRI Lumbar Spine W WO Cont [[id number]] Resulted: 10/12/20 1330   Order Status: Completed Updated: 10/12/20 1333   Narrative:     EXAMINATION:   MRI LUMBAR SPINE W WO CONTRAST     CLINICAL HISTORY:   eval discitis;     TECHNIQUE:   Multiplanar, multisequence MR images were acquired from the thoracolumbar junction to the sacrum without and with 10 mL Gadavist intravenous contrast.     COMPARISON:   01/25/2016     FINDINGS:   Alignment: Grade 2 anterolisthesis at L5-S1 secondary to bilateral L5 spondylolysis.  Grade 1 anterolisthesis noted at L3-L4.     Vertebrae: There are multilevel degenerative endplate changes.  No fracture or infiltrative process.     Discs: There is moderate to severe disc height loss throughout the lower lumbar spine.  Mild fluid signal noted at L3-S1, likely degenerative.     Cord: Normal.  Conus terminates at L1.     Enhancement: Mild enhancement noted about the arthritic L5-S1 facet joints, likely reactive.  No nerve root enhancement.     Degenerative findings:     T12-L1: No spinal canal stenosis or neural foraminal narrowing.     L1-L2: Mild facet arthropathy resulting in mild bilateral neural foraminal narrowing.     L2-L3: Circumferential disc bulge and moderate facet arthropathy result in moderate spinal canal stenosis and mild right neural foraminal narrowing.     L3-L4: Circumferential disc bulge and severe facet arthropathy result in severe spinal canal stenosis and mild right neural foraminal narrowing.     L4-L5: Circumferential disc bulge and moderate facet arthropathy result in moderate spinal canal stenosis and mild bilateral neural foraminal narrowing.     L5-S1: Malalignment with  moderate facet arthropathy result in severe bilateral neural foraminal narrowing.     Paraspinal muscles & soft tissues: Moderate paraspinal muscle atrophy.    Impression:       No evidence for spondylodiscitis.     Bilateral L5 spondylolysis with grade 2 anterolisthesis.     Multilevel degenerative changes of the lumbar spine detailed above.  Moderate to severe spinal canal stenosis noted at L2-L5.  Severe neural foraminal narrowing noted at L5-S1.       Electronically signed by: Tiago Luna MD   Date: 10/12/2020   Time: 13:30   Imaging History    2020  Date Procedure Name Status Accession Number Location   10/13/20 01:04 AM CT Thoracic Spine Without Contrast Final [id number] [facility]   10/13/20 01:04 AM CT Lumbar Spine Without Contrast Final [id number] [facility]   10/12/20 01:21 PM MRI Thoracic Spine W WO Cont Final [id number] [facility]   10/12/20 01:20 PM MRI Lumbar Spine W WO Cont Final [id number] [facility]   10/08/20 12:37 PM MRI Thoracic Spine Without Contrast Final [id number] [facility]   10/07/20 02:35 PM CT Renal Stone Study ABD Pelvis WO Final [id number] [facility]   10/07/20 02:35 PM X-Ray Chest AP Portable Final [id number] [facility]   09/23/20 01:18 PM X-Ray Chest 1 View Final [id number] [facility]   09/23/20 12:00 AM CARDIAC MONITORING STRIPS Final     10/12/20 09:30 AM Echo Color Flow Doppler? Yes Final

## 2020-10-16 NOTE — PT/OT/SLP DISCHARGE
Physical Therapy Discharge Summary    Name: Martina Betancourt  MRN: [id number]   Principal Problem: Discitis     Patient Discharged from acute Physical Therapy on 10/16/2020.  Please refer to prior PT noted date on 10/14/2020 for functional status.     Assessment:     Patient was discharged unexpectedly.  Information required to complete an accurate discharge summary is unknown.  Refer to therapy initial evaluation and last progress note for initial and most recent functional status and goal achievement.  Recommendations made may be found in medical record.    Objective:     GOALS:   Multidisciplinary Problems     Physical Therapy Goals        Problem: Physical Therapy Goal    Goal Priority Disciplines Outcome Goal Variances Interventions   Physical Therapy Goal     PT, PT/OT Ongoing, Progressing     Description: Goals to be met by: 10/23/2020    Patient will increase functional independence with mobility by performin. Pt will perform bed mobility (rolling L/R, scooting, and bridging) with Rose.  2. Pt will perform supine to/from sit with Rose.  3. Pt will sit EOB x 10 mins with no UE support with min assistance.  4. Pt will perform sit to stand with max assistance and RW.  5. Pt will perform there-ex from handout x 15 reps to improve strength for functional mobility.                         Reasons for Discontinuation of Therapy Services  PT orders discharged as patient with surgery on this date, will require new PT orders.      Plan:     Patient Discharged to: In house.    Gissell Holloway, PT  10/16/2020

## 2020-10-16 NOTE — PLAN OF CARE
Pt arrived to IR room 4 for disc aspiration, no acute distress noted. Orders, consent and labs reviewed on chart. Awaiting MD.

## 2020-10-16 NOTE — NURSING
Pt arrived to [facility] bed 2 for post disk aspiration recovery. Report received from Alley Julien. Dressing CDI. RN to bedside. See flow sheets for post procedure monitoring.

## 2020-10-16 NOTE — SUBJECTIVE & OBJECTIVE
Interval History: NAEON. AFVSS. Resting in bed in no acute distress, on oxygen via NC. Exam remains stable. Patient is alert and oriented. Denies chest pain, SOB, weakness, numbness, paresthesias. She states she has paresthesias to the bottom of her right foot towards the heel.     Medications:  Continuous Infusions:  Scheduled Meds:   aspirin  81 mg Oral Daily    bisacodyL  5 mg Oral Daily    cefTRIAXone (ROCEPHIN) IVPB  2 g Intravenous Q24H    donepeziL  10 mg Oral QHS    DULoxetine  60 mg Oral Daily    enoxaparin  40 mg Subcutaneous Q12H    isosorbide mononitrate  60 mg Oral Daily    lisinopriL  2.5 mg Oral Daily    metoprolol succinate  50 mg Oral Daily    miconazole nitrate 2%   Topical (Top) BID    pantoprazole  40 mg Oral Before breakfast    potassium bicarbonate  50 mEq Oral Once    pravastatin  40 mg Oral QHS    vancomycin (VANCOCIN) IVPB  1,750 mg Intravenous Q24H     PRN Meds:albuterol-ipratropium, bisacodyL, glucose, glucose, HYDROcodone-acetaminophen, HYDROmorphone, melatonin, ondansetron, prochlorperazine, sodium chloride 0.9%, Pharmacy to dose Vancomycin consult **AND** vancomycin - pharmacy to dose       Objective:     Weight: (!) 137 kg (302 lb)  Body mass index is 43.33 kg/m².  Vital Signs (Most Recent):  Temp: 98.2 °F (36.8 °C) (10/16/20 0936)  Pulse: 64 (10/16/20 0936)  Resp: 15 (10/16/20 1009)  BP: (!) 171/78 (10/16/20 0936)  SpO2: (!) 94 % (10/16/20 0936) Vital Signs (24h Range):  Temp:  [96.1 °F (35.6 °C)-98.2 °F (36.8 °C)] 98.2 °F (36.8 °C)  Pulse:  [60-65] 64  Resp:  [15-20] 15  SpO2:  [85 %-97 %] 94 %  BP: (138-181)/(65-78) 171/78                     Female External Urinary Catheter 10/07/20 1700 (Active)   Skin reddened;breakdown;perineum cleansed w/ soap and water 10/15/20 2000   Tolerance no signs/symptoms of discomfort 10/15/20 2000   Suction Continuous suction at 70 mmHg 10/15/20 2000   Date of last wick change 10/15/20 10/15/20 2000   Time of last wick change 1800  10/15/20 2000   Output (mL) 350 mL 10/10/20 0600       Neurosurgery Physical Exam    General: obese, no distress.   Head: normocephalic, atraumatic  Neck: No tracheal deviation.   Neurologic: Alert and oriented. Thought content appropriate.  GCS: Motor: 6/Verbal: 5/Eyes: 4 GCS Total: 15  Mental Status: Awake, Alert, Oriented x 4  Language: No aphasia  Speech: No dysarthria  Cranial nerves: face symmetric, tongue midline, CN II-XII grossly intact.   Eyes: pupils equal, round, reactive to light with accomodation, EOMI.  Ears: No drainage.   Pulmonary: normal respirations, no signs of respiratory distress, nasal cannula in place.   Sensory: intact to light touch throughout  Motor Strength: Moves all extremities spontaneously with good tone. Proximal BLE strength exam limited 2/2 body habitus and bed positioning. Grossly full strength upper and lower extremities. No abnormal movements seen.     Strength  Deltoids Triceps Biceps Wrist Extension Wrist Flexion Hand    Upper: R 5/5 5/5 5/5 5/5 5/5 5/5    L 5/5 5/5 5/5 5/5 5/5 5/5     Iliopsoas Quadriceps Knee  Flexion Tibialis  anterior Gastro- cnemius EHL   Lower: R 5/5 5/5 5/5 5/5 5/5 5/5    L 5/5 5/5 5/5 5/5 5/5 5/5     Clonus: absent  Skin: Skin is warm, dry and intact.      Significant Labs:  Recent Labs   Lab 10/15/20  0333 10/15/20  1641 10/16/20  0520   GLU 99 129* 87    140 139   K 2.9* 3.0* 3.9   CL 93* 95 95   CO2 35* 38* 34*   BUN 7* 7* 6*   CREATININE 0.8 0.7 0.7   CALCIUM 9.8 9.7 10.0     Recent Labs   Lab 10/15/20  0333 10/16/20  0520   WBC 10.12 7.81   HGB 10.4* 10.6*   HCT 35.6* 36.7*   * 355*     Recent Labs   Lab 10/15/20  0920   INR 1.1     Microbiology Results (last 7 days)     Procedure Component Value Units Date/Time    Blood culture [[id number]] Collected: 10/08/20 8415    Order Status: Completed Specimen: Blood Updated: 10/14/20 0612     Blood Culture, Routine No growth after 5 days.    Blood culture [[id number]] Collected: 10/08/20  2344    Order Status: Completed Specimen: Blood Updated: 10/14/20 0612     Blood Culture, Routine No growth after 5 days.        Recent Lab Results       10/16/20  0545   10/16/20  0520   10/15/20  1641   10/15/20  1603        TB Induration 48 - 72 hr read 0           Albumin   2.1         Alkaline Phosphatase   88         ALT   6         Anion Gap   10 7       Aniso   Slight         AST   14  Comment:  *Result may be interfered by visible hemolysis         Baso #   0.03         Basophil%   0.4         BILIRUBIN TOTAL   0.2  Comment:  For infants and newborns, interpretation of results should be based  on gestational age, weight and in agreement with clinical  observations.  Premature Infant recommended reference ranges:  Up to 24 hours.............<8.0 mg/dL  Up to 48 hours............<12.0 mg/dL  3-5 days..................<15.0 mg/dL  6-29 days.................<15.0 mg/dL           BUN, Bld   6 7       Calcium   10.0 9.7       Chloride   95 95       CO2   34 38       Creatinine   0.7 0.7       Differential Method   Automated         eGFR if    >60.0 >60.0       eGFR if non    >60.0  Comment:  Calculation used to obtain the estimated glomerular filtration  rate (eGFR) is the CKD-EPI equation.    >60.0  Comment:  Calculation used to obtain the estimated glomerular filtration  rate (eGFR) is the CKD-EPI equation.          Eos #   0.2         Eosinophil%   2.0         Glucose   87 129       Gran # (ANC)   4.8         Gran%   61.5         Hematocrit   36.7         Hemoglobin   10.6         Hypo   Occasional         Immature Grans (Abs)   0.03  Comment:  Mild elevation in immature granulocytes is non specific and   can be seen in a variety of conditions including stress response,   acute inflammation, trauma and pregnancy. Correlation with other   laboratory and clinical findings is essential.           Immature Granulocytes   0.4         Lymph #   1.8         Lymph%   23.0         MCH    27.2         MCHC   28.9         MCV   94         Mono #   1.0         Mono%   12.7         MPV   10.5         nRBC   0         Ovalocytes   Occasional         Platelet Estimate   Appears normal  Comment:  PLT CLUMPS SEEN ON SMEAR         Platelets   355         Potassium   3.9 3.0       PROTEIN TOTAL   7.2         RBC   3.90         RDW   15.6         Sodium   139 140       Target Cells   Occasional         Vancomycin-Trough       15.6     WBC   7.81             All pertinent labs from the last 24 hours have been reviewed.    Significant Diagnostics:  I have reviewed all pertinent imaging results/findings within the past 24 hours.

## 2020-10-17 LAB
ALBUMIN SERPL BCP-MCNC: 2.1 G/DL (ref 3.5–5.2)
ALP SERPL-CCNC: 97 U/L (ref 55–135)
ALT SERPL W/O P-5'-P-CCNC: 7 U/L (ref 10–44)
ANION GAP SERPL CALC-SCNC: 10 MMOL/L (ref 8–16)
ANISOCYTOSIS BLD QL SMEAR: SLIGHT
AST SERPL-CCNC: 13 U/L (ref 10–40)
BASOPHILS # BLD AUTO: 0.04 K/UL (ref 0–0.2)
BASOPHILS NFR BLD: 0.5 % (ref 0–1.9)
BILIRUB SERPL-MCNC: 0.2 MG/DL (ref 0.1–1)
BUN SERPL-MCNC: 7 MG/DL (ref 8–23)
BURR CELLS BLD QL SMEAR: ABNORMAL
CALCIUM SERPL-MCNC: 9.7 MG/DL (ref 8.7–10.5)
CHLORIDE SERPL-SCNC: 95 MMOL/L (ref 95–110)
CO2 SERPL-SCNC: 32 MMOL/L (ref 23–29)
CREAT SERPL-MCNC: 0.7 MG/DL (ref 0.5–1.4)
DIFFERENTIAL METHOD: ABNORMAL
EOSINOPHIL # BLD AUTO: 0.1 K/UL (ref 0–0.5)
EOSINOPHIL NFR BLD: 1.1 % (ref 0–8)
ERYTHROCYTE [DISTWIDTH] IN BLOOD BY AUTOMATED COUNT: 15.6 % (ref 11.5–14.5)
EST. GFR  (AFRICAN AMERICAN): >60 ML/MIN/1.73 M^2
EST. GFR  (NON AFRICAN AMERICAN): >60 ML/MIN/1.73 M^2
GLUCOSE SERPL-MCNC: 74 MG/DL (ref 70–110)
HCT VFR BLD AUTO: 35.9 % (ref 37–48.5)
HGB BLD-MCNC: 10.4 G/DL (ref 12–16)
HYPOCHROMIA BLD QL SMEAR: ABNORMAL
IMM GRANULOCYTES # BLD AUTO: 0.05 K/UL (ref 0–0.04)
IMM GRANULOCYTES NFR BLD AUTO: 0.6 % (ref 0–0.5)
LYMPHOCYTES # BLD AUTO: 1.7 K/UL (ref 1–4.8)
LYMPHOCYTES NFR BLD: 21.1 % (ref 18–48)
MCH RBC QN AUTO: 27.4 PG (ref 27–31)
MCHC RBC AUTO-ENTMCNC: 29 G/DL (ref 32–36)
MCV RBC AUTO: 95 FL (ref 82–98)
MONOCYTES # BLD AUTO: 1.1 K/UL (ref 0.3–1)
MONOCYTES NFR BLD: 14 % (ref 4–15)
NEUTROPHILS # BLD AUTO: 5.1 K/UL (ref 1.8–7.7)
NEUTROPHILS NFR BLD: 62.7 % (ref 38–73)
NRBC BLD-RTO: 0 /100 WBC
PLATELET # BLD AUTO: 344 K/UL (ref 150–350)
PLATELET BLD QL SMEAR: ABNORMAL
PMV BLD AUTO: 10.7 FL (ref 9.2–12.9)
POIKILOCYTOSIS BLD QL SMEAR: ABNORMAL
POTASSIUM SERPL-SCNC: 3.6 MMOL/L (ref 3.5–5.1)
PROT SERPL-MCNC: 7.1 G/DL (ref 6–8.4)
RBC # BLD AUTO: 3.8 M/UL (ref 4–5.4)
SODIUM SERPL-SCNC: 137 MMOL/L (ref 136–145)
WBC # BLD AUTO: 8.12 K/UL (ref 3.9–12.7)

## 2020-10-17 PROCEDURE — 63600175 PHARM REV CODE 636 W HCPCS: Performed by: STUDENT IN AN ORGANIZED HEALTH CARE EDUCATION/TRAINING PROGRAM

## 2020-10-17 PROCEDURE — 63600175 PHARM REV CODE 636 W HCPCS: Performed by: INTERNAL MEDICINE

## 2020-10-17 PROCEDURE — 99232 PR SUBSEQUENT HOSPITAL CARE,LEVL II: ICD-10-PCS | Mod: ,,, | Performed by: STUDENT IN AN ORGANIZED HEALTH CARE EDUCATION/TRAINING PROGRAM

## 2020-10-17 PROCEDURE — 99232 SBSQ HOSP IP/OBS MODERATE 35: CPT | Mod: ,,, | Performed by: STUDENT IN AN ORGANIZED HEALTH CARE EDUCATION/TRAINING PROGRAM

## 2020-10-17 PROCEDURE — 25000003 PHARM REV CODE 250: Performed by: INTERNAL MEDICINE

## 2020-10-17 PROCEDURE — 80053 COMPREHEN METABOLIC PANEL: CPT

## 2020-10-17 PROCEDURE — 25000003 PHARM REV CODE 250: Performed by: STUDENT IN AN ORGANIZED HEALTH CARE EDUCATION/TRAINING PROGRAM

## 2020-10-17 PROCEDURE — 63600175 PHARM REV CODE 636 W HCPCS: Performed by: PHYSICIAN ASSISTANT

## 2020-10-17 PROCEDURE — 36415 COLL VENOUS BLD VENIPUNCTURE: CPT

## 2020-10-17 PROCEDURE — 25000003 PHARM REV CODE 250: Performed by: HOSPITALIST

## 2020-10-17 PROCEDURE — 85025 COMPLETE CBC W/AUTO DIFF WBC: CPT

## 2020-10-17 PROCEDURE — 11000001 HC ACUTE MED/SURG PRIVATE ROOM

## 2020-10-17 RX ORDER — CLOPIDOGREL BISULFATE 75 MG/1
75 TABLET ORAL DAILY
Status: DISCONTINUED | OUTPATIENT
Start: 2020-10-17 | End: 2020-10-20 | Stop reason: HOSPADM

## 2020-10-17 RX ADMIN — ISOSORBIDE MONONITRATE 60 MG: 30 TABLET, EXTENDED RELEASE ORAL at 09:10

## 2020-10-17 RX ADMIN — HYDROCODONE BITARTRATE AND ACETAMINOPHEN 1 TABLET: 10; 325 TABLET ORAL at 05:10

## 2020-10-17 RX ADMIN — METOPROLOL SUCCINATE 50 MG: 50 TABLET, EXTENDED RELEASE ORAL at 08:10

## 2020-10-17 RX ADMIN — ENOXAPARIN SODIUM 40 MG: 40 INJECTION SUBCUTANEOUS at 09:10

## 2020-10-17 RX ADMIN — PANTOPRAZOLE SODIUM 40 MG: 40 TABLET, DELAYED RELEASE ORAL at 06:10

## 2020-10-17 RX ADMIN — DULOXETINE HYDROCHLORIDE 60 MG: 30 CAPSULE, DELAYED RELEASE ORAL at 08:10

## 2020-10-17 RX ADMIN — PRAVASTATIN SODIUM 40 MG: 40 TABLET ORAL at 09:10

## 2020-10-17 RX ADMIN — DONEPEZIL HYDROCHLORIDE 10 MG: 10 TABLET ORAL at 09:10

## 2020-10-17 RX ADMIN — BISACODYL 5 MG: 5 TABLET, COATED ORAL at 08:10

## 2020-10-17 RX ADMIN — ASPIRIN 81 MG: 81 TABLET, COATED ORAL at 08:10

## 2020-10-17 RX ADMIN — HYDROCODONE BITARTRATE AND ACETAMINOPHEN 1 TABLET: 10; 325 TABLET ORAL at 06:10

## 2020-10-17 RX ADMIN — HYDROCODONE BITARTRATE AND ACETAMINOPHEN 1 TABLET: 10; 325 TABLET ORAL at 12:10

## 2020-10-17 RX ADMIN — MICONAZOLE NITRATE: 20 OINTMENT TOPICAL at 09:10

## 2020-10-17 RX ADMIN — ENOXAPARIN SODIUM 40 MG: 40 INJECTION SUBCUTANEOUS at 08:10

## 2020-10-17 RX ADMIN — CEFTRIAXONE SODIUM 2 G: 2 INJECTION, SOLUTION INTRAVENOUS at 09:10

## 2020-10-17 RX ADMIN — CLOPIDOGREL 75 MG: 75 TABLET, FILM COATED ORAL at 09:10

## 2020-10-17 RX ADMIN — VANCOMYCIN HYDROCHLORIDE 1750 MG: 10 INJECTION, POWDER, LYOPHILIZED, FOR SOLUTION INTRAVENOUS at 04:10

## 2020-10-17 RX ADMIN — LISINOPRIL 2.5 MG: 2.5 TABLET ORAL at 08:10

## 2020-10-17 NOTE — ASSESSMENT & PLAN NOTE
Martina Betancourt is a 70 y.o. female with recent MRSA bacteremia who presents with new onselt back pain, found to haveT9-T10 discitis with bone erosion and possible adjacent soft tissue abscesses.     Neurologically intact on exam.     - All labs and imaging personally reviewed  - q4 neuro checks  - MRI w wo contrast of thoracic and lumbar spine shows discitis/osteomyelitis at T9-T10 with T8-T10 phlegmon. There is a bilateral spondylolysis at L5 and central canal narrowing from L3-L5.   - CT thoracic/lumbar spine shows T9-T10 discitis/osteomyelitis. No retropulsion, no focal kyphosis.   - No emergent neurosurgical intervention indicated at this time.  - IR Bx of T9/T10 disc space completed yesterday. No organisms seen; cx pending.   - ID: Recent hospitalization for MRSA bacteremia, likely infectious cause. Blood cultures 10/8 NGTD. WBC wnl, afebrile.    - ID recommending vanc + rocephin anticipated for 6-8 weeks.   - Constipation: Recommend escalate bowel regimen for c/o constipation   - Bilateral pleural effusions: seen on CT lumbar spine. On 3L NC. ID recommending pulm consult for diagnostic/therapeutic thoracentesis.  - Please notify neurosx for any changes in neuro exam. Will continue to follow closely.

## 2020-10-17 NOTE — PROGRESS NOTES
[facility]  Neurosurgery  Progress Note    Subjective:     History of Present Illness: Patient is a 70 year old F with a PMHx of Alzheimer's dz, HTN, HLD, CAD, YOVANI, and morbid obesity who was recently hospitalized x2 for MRSA bacteremia complicated by pneumonia and possible UTI, treated with linozolid x8 days and cipro and discharged 10/1. She was admitted to [facility] 10/8 from ED, presenting for excruciating flank pain x 24 hours. Ct renal stone study negative, but revealed T9-T10 discitis. Non-contrast MRI T spine prior to transfer revealed T9-T10 discitis/osteomyelitis with vertebral bone erosion and possible adjacent soft tissue abscesses. Patient transfer 10/8, began IV vanc 10/8 and IV rocephin 10/9. On neurosx consult exam, patient is awake alert and cooperative, AOx4. 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 Patient reports nonambulatory at baseline, denies changes to her activity other than pain limitation. Neurologically intact on PEx, no evidence of motor/sensory deficit.           Post-Op Info:  * No surgery found *         Interval History: 10/17: UMU. AFVSS. Neuro-stable. IR disc Bx completed yesterday; GS negative and other results pending.     Medications:  Continuous Infusions:  Scheduled Meds:   aspirin  81 mg Oral Daily    bisacodyL  5 mg Oral Daily    cefTRIAXone (ROCEPHIN) IVPB  2 g Intravenous Q24H    clopidogreL  75 mg Oral Daily    donepeziL  10 mg Oral QHS    DULoxetine  60 mg Oral Daily    enoxaparin  40 mg Subcutaneous Q12H    isosorbide mononitrate  60 mg Oral Daily    lisinopriL  2.5 mg Oral Daily    metoprolol succinate  50 mg Oral Daily    miconazole nitrate 2%   Topical (Top) BID    pantoprazole  40 mg  Oral Before breakfast    potassium bicarbonate  50 mEq Oral Once    pravastatin  40 mg Oral QHS    vancomycin (VANCOCIN) IVPB  1,750 mg Intravenous Q24H     PRN Meds:albuterol-ipratropium, bisacodyL, glucose, glucose, hydrALAZINE, HYDROcodone-acetaminophen, HYDROmorphone, melatonin, ondansetron, prochlorperazine, sodium chloride 0.9%, Pharmacy to dose Vancomycin consult **AND** vancomycin - pharmacy to dose     Review of Systems  Objective:     Weight: (!) 137 kg (302 lb)  Body mass index is 43.33 kg/m².  Vital Signs (Most Recent):  Temp: 98.2 °F (36.8 °C) (10/17/20 1208)  Pulse: 73 (10/17/20 1208)  Resp: (!) 21 (10/17/20 1208)  BP: (!) 143/68 (10/17/20 1208)  SpO2: (!) 90 % (10/17/20 1208) Vital Signs (24h Range):  Temp:  [97.4 °F (36.3 °C)-98.9 °F (37.2 °C)] 98.2 °F (36.8 °C)  Pulse:  [59-73] 73  Resp:  [16-25] 21  SpO2:  [84 %-92 %] 90 %  BP: (139-157)/(66-75) 143/68     Date 10/17/20 0700 - 10/18/20 0659   Shift 4617-4315 9375-3729 3925-3909 24 Hour Total   INTAKE   P.O. 480   480   Shift Total(mL/kg) 480(3.5)   480(3.5)   OUTPUT   Shift Total(mL/kg)       Weight (kg) 137 137 137 137                   Female External Urinary Catheter 10/07/20 1700 (Active)   Skin perineum cleansed w/ soap and water 10/17/20 1000   Tolerance no signs/symptoms of discomfort 10/17/20 1000   Suction Continuous suction at 70 mmHg 10/17/20 1000   Date of last wick change 10/17/20 10/17/20 1000   Time of last wick change 1800 10/15/20 2000   Output (mL) 350 mL 10/10/20 0600       Neurosurgery Physical Exam     General: obese, no distress.   Head: normocephalic, atraumatic  Neck: No tracheal deviation.   Neurologic: Alert and oriented. Thought content appropriate.  GCS: Motor: 6/Verbal: 5/Eyes: 4 GCS Total: 15  Mental Status: Awake, Alert, Oriented x 4  Language: No aphasia  Speech: No dysarthria  Cranial nerves: face symmetric, tongue midline, CN II-XII grossly intact.   Eyes: pupils equal, round, reactive to light with  accomodation, EOMI.  Ears: No drainage.   Pulmonary: normal respirations, no signs of respiratory distress, nasal cannula in place.   Sensory: intact to light touch throughout  Motor Strength: Moves all extremities spontaneously with good tone. Proximal BLE strength exam limited 2/2 body habitus and bed positioning. Grossly full strength upper and lower extremities. No abnormal movements seen.      Strength   Deltoids Triceps Biceps Wrist Extension Wrist Flexion Hand    Upper: R 5/5 5/5 5/5 5/5 5/5 5/5     L 5/5 5/5 5/5 5/5 5/5 5/5       Iliopsoas Quadriceps Knee  Flexion Tibialis  anterior Gastro- cnemius EHL   Lower: R 5/5 5/5 5/5 5/5 5/5 5/5     L 5/5 5/5 5/5 5/5 5/5 5/5      Clonus: absent  Skin: Skin is warm, dry and intact.      Significant Labs:  Recent Labs   Lab 10/15/20  1641 10/16/20  0520 10/17/20  0351   * 87 74    139 137   K 3.0* 3.9 3.6   CL 95 95 95   CO2 38* 34* 32*   BUN 7* 6* 7*   CREATININE 0.7 0.7 0.7   CALCIUM 9.7 10.0 9.7     Recent Labs   Lab 10/16/20  0520 10/17/20  0351   WBC 7.81 8.12   HGB 10.6* 10.4*   HCT 36.7* 35.9*   * 344     No results for input(s): LABPT, INR, APTT in the last 48 hours.  Microbiology Results (last 7 days)     Procedure Component Value Units Date/Time    Aerobic culture [[id number]] Collected: 10/16/20 1228    Order Status: Completed Specimen: Body Fluid from Back Updated: 10/17/20 1037     Aerobic Bacterial Culture No growth    Narrative:      T9-T10    Gram stain [[id number]] Collected: 10/16/20 1228    Order Status: Completed Specimen: Body Fluid from Back Updated: 10/16/20 1548     Gram Stain Result No WBC's      No organisms seen    Narrative:      T9-T10    AFB Culture & Smear [[id number]] Collected: 10/16/20 1228    Order Status: Sent Specimen: Body Fluid from Back Updated: 10/16/20 1515    Fungus culture [[id number]] Collected: 10/16/20 1228    Order Status: Sent Specimen: Body Fluid from Back Updated: 10/16/20 1515    Culture, Anaerobe  [[id number]] Collected: 10/16/20 1228    Order Status: Sent Specimen: Body Fluid from Back Updated: 10/16/20 1514    Blood culture [[id number]] Collected: 10/08/20 2345    Order Status: Completed Specimen: Blood Updated: 10/14/20 0612     Blood Culture, Routine No growth after 5 days.    Blood culture [[id number]] Collected: 10/08/20 2344    Order Status: Completed Specimen: Blood Updated: 10/14/20 0612     Blood Culture, Routine No growth after 5 days.        All pertinent labs from the last 24 hours have been reviewed.    Significant Diagnostics:  I have reviewed all pertinent imaging results/findings within the past 24 hours.   No results found in the last 24 hours.      Assessment/Plan:     * Discitis  Martina Betancourt is a 70 y.o. female with recent MRSA bacteremia who presents with new onselt back pain, found to haveT9-T10 discitis with bone erosion and possible adjacent soft tissue abscesses.     Neurologically intact on exam.     - All labs and imaging personally reviewed  - q4 neuro checks  - MRI w wo contrast of thoracic and lumbar spine shows discitis/osteomyelitis at T9-T10 with T8-T10 phlegmon. There is a bilateral spondylolysis at L5 and central canal narrowing from L3-L5.   - CT thoracic/lumbar spine shows T9-T10 discitis/osteomyelitis. No retropulsion, no focal kyphosis.   - No emergent neurosurgical intervention indicated at this time.  - IR Bx of T9/T10 disc space completed yesterday. No organisms seen; cx pending.   - ID: Recent hospitalization for MRSA bacteremia, likely infectious cause. Blood cultures 10/8 NGTD. WBC wnl, afebrile.    - ID recommending vanc + rocephin anticipated for 6-8 weeks.   - Constipation: Recommend escalate bowel regimen for c/o constipation   - Bilateral pleural effusions: seen on CT lumbar spine. On 3L NC. ID recommending pulm consult for diagnostic/therapeutic thoracentesis.  - Please notify neurosx for any changes in neuro exam. Will continue to follow closely.                Abhishek Marte MD  Neurosurgery  [facility]

## 2020-10-17 NOTE — HOSPITAL COURSE
10/17: UMU. RADS. Neuro-stable. IR disc Bx completed yesterday; GS negative and other results pending.   10/18: UMU. RADS. Neuro-stable. IR Bx negative.

## 2020-10-17 NOTE — SUBJECTIVE & OBJECTIVE
Interval History: 10/17: NAEON. AFVSS. Neuro-stable. IR disc Bx completed yesterday; GS negative and other results pending.     Medications:  Continuous Infusions:  Scheduled Meds:   aspirin  81 mg Oral Daily    bisacodyL  5 mg Oral Daily    cefTRIAXone (ROCEPHIN) IVPB  2 g Intravenous Q24H    clopidogreL  75 mg Oral Daily    donepeziL  10 mg Oral QHS    DULoxetine  60 mg Oral Daily    enoxaparin  40 mg Subcutaneous Q12H    isosorbide mononitrate  60 mg Oral Daily    lisinopriL  2.5 mg Oral Daily    metoprolol succinate  50 mg Oral Daily    miconazole nitrate 2%   Topical (Top) BID    pantoprazole  40 mg Oral Before breakfast    potassium bicarbonate  50 mEq Oral Once    pravastatin  40 mg Oral QHS    vancomycin (VANCOCIN) IVPB  1,750 mg Intravenous Q24H     PRN Meds:albuterol-ipratropium, bisacodyL, glucose, glucose, hydrALAZINE, HYDROcodone-acetaminophen, HYDROmorphone, melatonin, ondansetron, prochlorperazine, sodium chloride 0.9%, Pharmacy to dose Vancomycin consult **AND** vancomycin - pharmacy to dose     Review of Systems  Objective:     Weight: (!) 137 kg (302 lb)  Body mass index is 43.33 kg/m².  Vital Signs (Most Recent):  Temp: 98.2 °F (36.8 °C) (10/17/20 1208)  Pulse: 73 (10/17/20 1208)  Resp: (!) 21 (10/17/20 1208)  BP: (!) 143/68 (10/17/20 1208)  SpO2: (!) 90 % (10/17/20 1208) Vital Signs (24h Range):  Temp:  [97.4 °F (36.3 °C)-98.9 °F (37.2 °C)] 98.2 °F (36.8 °C)  Pulse:  [59-73] 73  Resp:  [16-25] 21  SpO2:  [84 %-92 %] 90 %  BP: (139-157)/(66-75) 143/68     Date 10/17/20 0700 - 10/18/20 0659   Shift 2218-0968 0803-3135 7890-6647 24 Hour Total   INTAKE   P.O. 480   480   Shift Total(mL/kg) 480(3.5)   480(3.5)   OUTPUT   Shift Total(mL/kg)       Weight (kg) 137 137 137 137                   Female External Urinary Catheter 10/07/20 1700 (Active)   Skin perineum cleansed w/ soap and water 10/17/20 1000   Tolerance no signs/symptoms of discomfort 10/17/20 1000   Suction Continuous suction  at 70 mmHg 10/17/20 1000   Date of last wick change 10/17/20 10/17/20 1000   Time of last wick change 1800 10/15/20 2000   Output (mL) 350 mL 10/10/20 0600       Neurosurgery Physical Exam     General: obese, no distress.   Head: normocephalic, atraumatic  Neck: No tracheal deviation.   Neurologic: Alert and oriented. Thought content appropriate.  GCS: Motor: 6/Verbal: 5/Eyes: 4 GCS Total: 15  Mental Status: Awake, Alert, Oriented x 4  Language: No aphasia  Speech: No dysarthria  Cranial nerves: face symmetric, tongue midline, CN II-XII grossly intact.   Eyes: pupils equal, round, reactive to light with accomodation, EOMI.  Ears: No drainage.   Pulmonary: normal respirations, no signs of respiratory distress, nasal cannula in place.   Sensory: intact to light touch throughout  Motor Strength: Moves all extremities spontaneously with good tone. Proximal BLE strength exam limited 2/2 body habitus and bed positioning. Grossly full strength upper and lower extremities. No abnormal movements seen.      Strength   Deltoids Triceps Biceps Wrist Extension Wrist Flexion Hand    Upper: R 5/5 5/5 5/5 5/5 5/5 5/5     L 5/5 5/5 5/5 5/5 5/5 5/5       Iliopsoas Quadriceps Knee  Flexion Tibialis  anterior Gastro- cnemius EHL   Lower: R 5/5 5/5 5/5 5/5 5/5 5/5     L 5/5 5/5 5/5 5/5 5/5 5/5      Clonus: absent  Skin: Skin is warm, dry and intact.      Significant Labs:  Recent Labs   Lab 10/15/20  1641 10/16/20  0520 10/17/20  0351   * 87 74    139 137   K 3.0* 3.9 3.6   CL 95 95 95   CO2 38* 34* 32*   BUN 7* 6* 7*   CREATININE 0.7 0.7 0.7   CALCIUM 9.7 10.0 9.7     Recent Labs   Lab 10/16/20  0520 10/17/20  0351   WBC 7.81 8.12   HGB 10.6* 10.4*   HCT 36.7* 35.9*   * 344     No results for input(s): LABPT, INR, APTT in the last 48 hours.  Microbiology Results (last 7 days)     Procedure Component Value Units Date/Time    Aerobic culture [[id number]] Collected: 10/16/20 1228    Order Status: Completed Specimen:  Body Fluid from Back Updated: 10/17/20 1037     Aerobic Bacterial Culture No growth    Narrative:      T9-T10    Gram stain [[id number]] Collected: 10/16/20 1228    Order Status: Completed Specimen: Body Fluid from Back Updated: 10/16/20 1548     Gram Stain Result No WBC's      No organisms seen    Narrative:      T9-T10    AFB Culture & Smear [[id number]] Collected: 10/16/20 1228    Order Status: Sent Specimen: Body Fluid from Back Updated: 10/16/20 1515    Fungus culture [[id number]] Collected: 10/16/20 1228    Order Status: Sent Specimen: Body Fluid from Back Updated: 10/16/20 1515    Culture, Anaerobe [[id number]] Collected: 10/16/20 1228    Order Status: Sent Specimen: Body Fluid from Back Updated: 10/16/20 1514    Blood culture [[id number]] Collected: 10/08/20 2345    Order Status: Completed Specimen: Blood Updated: 10/14/20 0612     Blood Culture, Routine No growth after 5 days.    Blood culture [[id number]] Collected: 10/08/20 2344    Order Status: Completed Specimen: Blood Updated: 10/14/20 0612     Blood Culture, Routine No growth after 5 days.        All pertinent labs from the last 24 hours have been reviewed.    Significant Diagnostics:  I have reviewed all pertinent imaging results/findings within the past 24 hours.   No results found in the last 24 hours.

## 2020-10-17 NOTE — PROGRESS NOTES
Hospital Medicine  Progress note    Team: [facility] [place] HOSP MED A Shama Coronado MD  Admit Date: 10/8/2020  BRIT 10/20/2020  Length of Stay:  LOS: 9 days   Code status: Full Code    Principal Problem:  Discitis    HPI / Hospital Course     Interval hx: Micro from IR drainage in process, remains on IV abx. BP better controlled, patient having bowel movements.     10/16 Underwent IR drainage today, significantly hypertensive after. PRN hydralazine ordered.     10/15 Unable to do IR drainage of abscesses today, will plan again for tomorrow. NPO at midnight. Remains on vancomycin and ceftriaxone.     10/14 Biopsy planned for Thursday.    10/13 Zosyn stopped, rocephin started. Neurosurgery consult,II spoke to them, final recs pending,. WBC -8.2, afebrile, BC- NGTD.  MRI and CT scan of back completed, some nerve root compression noted, await NSG input.    10/09 await ID consult. VSS, WBC is 13.2. BC-NGTD    ROS     Respiratory: neg for cough neg for shortness of breath  Cardiovascular: neg for chest pain neg for palpitations  Gastrointestinal: neg for nausea neg for vomiting, neg for abdominal pain neg for diarrhea neg for constipation   Behavioral/Psych: neg for depression neg for anxiety    PEx  Temp:  [97.4 °F (36.3 °C)-98.9 °F (37.2 °C)]   Pulse:  [65-73]   Resp:  [16-25]   BP: (139-157)/(66-75)   SpO2:  [84 %-94 %]     Intake/Output Summary (Last 24 hours) at 10/17/2020 1623  Last data filed at 10/17/2020 1600  Gross per 24 hour   Intake 720 ml   Output --   Net 720 ml       General Appearance: no acute distress   Heart: regular rate and rhythm  Respiratory: Normal respiratory effort, no crackles   Abdomen: Soft, non-tender; bowel sounds active  Skin: intact.Skin intact  Neurologic:  No focal numbness or weakness  Mental status: Alert, oriented x 4, affect appropriate     Recent Labs   Lab 10/15/20  0333 10/16/20  0520 10/17/20  0351   WBC 10.12 7.81 8.12   HGB 10.4* 10.6* 10.4*   HCT 35.6* 36.7* 35.9*   * 355* 344      Recent Labs   Lab 10/15/20  1641 10/16/20  0520 10/17/20  0351    139 137   K 3.0* 3.9 3.6   CL 95 95 95   CO2 38* 34* 32*   BUN 7* 6* 7*   CREATININE 0.7 0.7 0.7   * 87 74   CALCIUM 9.7 10.0 9.7     Recent Labs   Lab 10/15/20  0333 10/15/20  0920 10/16/20  0520 10/17/20  0351   ALKPHOS 92  --  88 97   ALT 6*  --  6* 7*   AST 9*  --  14 13   ALBUMIN 2.1*  --  2.1* 2.1*   PROT 7.1  --  7.2 7.1   BILITOT 0.2  --  0.2 0.2   INR  --  1.1  --   --         Recent Labs   Lab 10/11/20  1153 10/14/20  1148 10/14/20  1634   POCTGLUCOSE 119* 99 104       Scheduled Meds:   aspirin  81 mg Oral Daily    bisacodyL  5 mg Oral Daily    cefTRIAXone (ROCEPHIN) IVPB  2 g Intravenous Q24H    clopidogreL  75 mg Oral Daily    donepeziL  10 mg Oral QHS    DULoxetine  60 mg Oral Daily    enoxaparin  40 mg Subcutaneous Q12H    isosorbide mononitrate  60 mg Oral Daily    lisinopriL  2.5 mg Oral Daily    metoprolol succinate  50 mg Oral Daily    miconazole nitrate 2%   Topical (Top) BID    pantoprazole  40 mg Oral Before breakfast    potassium bicarbonate  50 mEq Oral Once    pravastatin  40 mg Oral QHS    vancomycin (VANCOCIN) IVPB  1,750 mg Intravenous Q24H     Continuous Infusions:  As Needed:  albuterol-ipratropium, bisacodyL, glucose, glucose, hydrALAZINE, HYDROcodone-acetaminophen, HYDROmorphone, melatonin, ondansetron, prochlorperazine, sodium chloride 0.9%, Pharmacy to dose Vancomycin consult **AND** vancomycin - pharmacy to dose    ** update problem list    Active Hospital Problems    Diagnosis  POA    *Discitis [M46.40]  Yes    Back pain [M54.9]  Yes    Bacteremia due to Staphylococcus aureus [R78.81, B95.61]  No    Hydronephrosis [N13.30]  Yes    HTN (hypertension) [I10]  Unknown    CAD (coronary artery disease) [I25.10]  Unknown    MRSA bacteremia [R78.81, B95.62]  Yes    Dementia without behavioral disturbance [F03.90]  Yes      Resolved Hospital Problems   No resolved problems to display.        Assessment and Plan  / Problems managed today    MRSA Bacteremia  Discitis  -Pt. With recent hx of MRSA bacteremia presents with back pain. MRI shows  Evidence of discitis/osteomyelitis at T9-T10 with possible small associated abscesses within the adjacent soft tissues  -Neurosurgery consulted regarding need for intervention  -Continue broad spectrum abx with vanc/zosyn therapy for now, but strong suspicion for MRSA as cause.  -Blood cultures ordered as they were not drawn at OSH prior to transfer (note that abx have already been given)  -Consult ID for further recommendations regarding need for zosyn  -Remains on vancomycin and ceftriaxone.   -s/p IR abscess drainage on 10/16, micro labs pending     HTN  -Continue home BP meds     Dementia without behavioral disturbance  -Continue donepezil PRN  -Delirium precautions     CAD  -Pt. With recent NSTEMI during admission last month. Was deemed Poor cath lab candidate - dementia/DNR  -Continue GDMT with ASA, b-blocker, ACEi, and statin. Will hold plavix for now as pt. Does not have an absolute indication until neurosurgery evaluates need for intervention     DVT PPx: Lovenox        Goals of Care:  Return to prior functional status    Discharge plan:    Time (minutes) spent in care of the patient (Greater than 1/2 spent in direct face-to-face contact)  35 minutes    Shama Coronado MD

## 2020-10-17 NOTE — PROGRESS NOTES
Hospital Medicine  Progress note    Team: [facility] [place] HOSP MED A Shama Coronado MD  Admit Date: 10/8/2020  BRIT 10/20/2020  Length of Stay:  LOS: 8 days   Code status: Full Code    Principal Problem:  Discitis    HPI / Hospital Course     Interval hx:  Underwent IR drainage today, significantly hypertensive after. PRN hydralazine ordered.     10/15 Unable to do IR drainage of abscesses today, will plan again for tomorrow. NPO at midnight. Remains on vancomycin and ceftriaxone.     10/14 Biopsy planned for Thursday.    10/13 Zosyn stopped, rocephin started. Neurosurgery consult,II spoke to them, final recs pending,. WBC -8.2, afebrile, BC- NGTD.  MRI and CT scan of back completed, some nerve root compression noted, await NSG input.    10/09 await ID consult. VSS, WBC is 13.2. BC-NGTD    ROS     Respiratory: neg for cough neg for shortness of breath  Cardiovascular: neg for chest pain neg for palpitations  Gastrointestinal: neg for nausea neg for vomiting, neg for abdominal pain neg for diarrhea neg for constipation   Behavioral/Psych: neg for depression neg for anxiety    PEx  Temp:  [96.1 °F (35.6 °C)-98.4 °F (36.9 °C)]   Pulse:  [58-70]   Resp:  [10-23]   BP: (138-206)/(65-92)   SpO2:  [85 %-100 %]   No intake or output data in the 24 hours ending 10/16/20 1902    General Appearance: no acute distress   Heart: regular rate and rhythm  Respiratory: Normal respiratory effort, no crackles   Abdomen: Soft, non-tender; bowel sounds active  Skin: intact.Skin intact  Neurologic:  No focal numbness or weakness  Mental status: Alert, oriented x 4, affect appropriate     Recent Labs   Lab 10/14/20  0501 10/15/20  0333 10/16/20  0520   WBC 8.52 10.12 7.81   HGB 10.1* 10.4* 10.6*   HCT 34.4* 35.6* 36.7*   * 402* 355*     Recent Labs   Lab 10/15/20  0333 10/15/20  1641 10/16/20  0520    140 139   K 2.9* 3.0* 3.9   CL 93* 95 95   CO2 35* 38* 34*   BUN 7* 7* 6*   CREATININE 0.8 0.7 0.7   GLU 99 129* 87   CALCIUM 9.8 9.7  10.0     Recent Labs   Lab 10/14/20  0502 10/15/20  0333 10/15/20  0920 10/16/20  0520   ALKPHOS 88 92  --  88   ALT 5* 6*  --  6*   AST 9* 9*  --  14   ALBUMIN 1.9* 2.1*  --  2.1*   PROT 7.0 7.1  --  7.2   BILITOT 0.2 0.2  --  0.2   INR  --   --  1.1  --         Recent Labs   Lab 10/11/20  1153 10/14/20  1148 10/14/20  1634   POCTGLUCOSE 119* 99 104       Scheduled Meds:   aspirin  81 mg Oral Daily    bisacodyL  5 mg Oral Daily    cefTRIAXone (ROCEPHIN) IVPB  2 g Intravenous Q24H    donepeziL  10 mg Oral QHS    DULoxetine  60 mg Oral Daily    enoxaparin  40 mg Subcutaneous Q12H    isosorbide mononitrate  60 mg Oral Daily    lisinopriL  2.5 mg Oral Daily    metoprolol succinate  50 mg Oral Daily    miconazole nitrate 2%   Topical (Top) BID    pantoprazole  40 mg Oral Before breakfast    potassium bicarbonate  50 mEq Oral Once    pravastatin  40 mg Oral QHS    vancomycin (VANCOCIN) IVPB  1,750 mg Intravenous Q24H     Continuous Infusions:  As Needed:  albuterol-ipratropium, bisacodyL, glucose, glucose, hydrALAZINE, HYDROcodone-acetaminophen, HYDROmorphone, melatonin, ondansetron, prochlorperazine, sodium chloride 0.9%, Pharmacy to dose Vancomycin consult **AND** vancomycin - pharmacy to dose    ** update problem list    Active Hospital Problems    Diagnosis  POA    *Discitis [M46.40]  Yes    Back pain [M54.9]  Yes    Bacteremia due to Staphylococcus aureus [R78.81, B95.61]  No    Hydronephrosis [N13.30]  Yes    HTN (hypertension) [I10]  Unknown    CAD (coronary artery disease) [I25.10]  Unknown    MRSA bacteremia [R78.81, B95.62]  Yes    Dementia without behavioral disturbance [F03.90]  Yes      Resolved Hospital Problems   No resolved problems to display.       Assessment and Plan  / Problems managed today    MRSA Bacteremia  Discitis  -Pt. With recent hx of MRSA bacteremia presents with back pain. MRI shows  Evidence of discitis/osteomyelitis at T9-T10 with possible small associated abscesses  within the adjacent soft tissues  -Neurosurgery consulted regarding need for intervention  -Continue broad spectrum abx with vanc/zosyn therapy for now, but strong suspicion for MRSA as cause.  -Blood cultures ordered as they were not drawn at OSH prior to transfer (note that abx have already been given)  -Consult ID for further recommendations regarding need for zosyn  -Unable to do IR drainage of abscesses today, will plan again for tomorrow. NPO at midnight. Remains on vancomycin and ceftriaxone.      HTN  -Continue home BP meds     Dementia without behavioral disturbance  -Continue donepezil PRN  -Delirium precautions     CAD  -Pt. With recent NSTEMI during admission last month. Was deemed Poor cath lab candidate - dementia/DNR  -Continue GDMT with ASA, b-blocker, ACEi, and statin. Will hold plavix for now as pt. Does not have an absolute indication until neurosurgery evaluates need for intervention     DVT PPx: Lovenox        Goals of Care:  Return to prior functional status    Discharge plan:    Time (minutes) spent in care of the patient (Greater than 1/2 spent in direct face-to-face contact)  35 minutes    Shama Coronado MD

## 2020-10-17 NOTE — PLAN OF CARE
Problem: Adult Inpatient Plan of Care  Goal: Plan of Care Review  Outcome: Ongoing, Progressing  Goal: Optimal Comfort and Wellbeing  Outcome: Ongoing, Progressing  Intervention: Provide Person-Centered Care  Flowsheets (Taken 10/17/2020 0500)  Trust Relationship/Rapport:   care explained   choices provided   emotional support provided   empathic listening provided   questions answered   questions encouraged   reassurance provided   thoughts/feelings acknowledged  Goal: Rounds/Family Conference  Outcome: Ongoing, Progressing     Problem: Infection  Goal: Infection Symptom Resolution  Outcome: Ongoing, Progressing     Problem: Skin Injury Risk Increased  Goal: Skin Health and Integrity  Outcome: Ongoing, Progressing    Optimal pain control promoted thought out over night shift with PRN pain medication as ordered.  Patient has had a restful night.  Safety promoted with call bell in reach, personal items within reach, lighting adjusted for optimal rest, bed alarms on, and wheels locked with bed in low position.  VSS.  Will continue to monitor.

## 2020-10-17 NOTE — PLAN OF CARE
Problem: Skin Injury Risk Increased  Goal: Skin Health and Integrity  Outcome: Ongoing, Progressing     Problem: Wound  Goal: Optimal Wound Healing  Outcome: Ongoing, Progressing     Problem: Infection  Goal: Infection Symptom Resolution  Outcome: Ongoing, Progressing    Patient and family educated on the above s/s to report.   Both verbalized.   Patient resting today with out complaints of SOB, Chest pain or distress.  Patient medicated at 12:00 for pain level 10/10 with hydrocodone 10/325 mg.    Patient kaiden well with mild pain relief.  Will cont POC and monitor pain. SOB and distress.

## 2020-10-18 LAB
ALBUMIN SERPL BCP-MCNC: 2.1 G/DL (ref 3.5–5.2)
ALP SERPL-CCNC: 95 U/L (ref 55–135)
ALT SERPL W/O P-5'-P-CCNC: 6 U/L (ref 10–44)
ANION GAP SERPL CALC-SCNC: 8 MMOL/L (ref 8–16)
AST SERPL-CCNC: 8 U/L (ref 10–40)
BASOPHILS # BLD AUTO: 0.02 K/UL (ref 0–0.2)
BASOPHILS NFR BLD: 0.2 % (ref 0–1.9)
BILIRUB SERPL-MCNC: 0.2 MG/DL (ref 0.1–1)
BUN SERPL-MCNC: 10 MG/DL (ref 8–23)
CALCIUM SERPL-MCNC: 9.8 MG/DL (ref 8.7–10.5)
CHLORIDE SERPL-SCNC: 95 MMOL/L (ref 95–110)
CO2 SERPL-SCNC: 36 MMOL/L (ref 23–29)
CREAT SERPL-MCNC: 0.7 MG/DL (ref 0.5–1.4)
DIFFERENTIAL METHOD: ABNORMAL
EOSINOPHIL # BLD AUTO: 0.1 K/UL (ref 0–0.5)
EOSINOPHIL NFR BLD: 1.1 % (ref 0–8)
ERYTHROCYTE [DISTWIDTH] IN BLOOD BY AUTOMATED COUNT: 15.7 % (ref 11.5–14.5)
EST. GFR  (AFRICAN AMERICAN): >60 ML/MIN/1.73 M^2
EST. GFR  (NON AFRICAN AMERICAN): >60 ML/MIN/1.73 M^2
GLUCOSE SERPL-MCNC: 88 MG/DL (ref 70–110)
HCT VFR BLD AUTO: 36.3 % (ref 37–48.5)
HGB BLD-MCNC: 10.4 G/DL (ref 12–16)
IMM GRANULOCYTES # BLD AUTO: 0.03 K/UL (ref 0–0.04)
IMM GRANULOCYTES NFR BLD AUTO: 0.3 % (ref 0–0.5)
LYMPHOCYTES # BLD AUTO: 2.2 K/UL (ref 1–4.8)
LYMPHOCYTES NFR BLD: 24.3 % (ref 18–48)
MCH RBC QN AUTO: 26.9 PG (ref 27–31)
MCHC RBC AUTO-ENTMCNC: 28.7 G/DL (ref 32–36)
MCV RBC AUTO: 94 FL (ref 82–98)
MONOCYTES # BLD AUTO: 1.3 K/UL (ref 0.3–1)
MONOCYTES NFR BLD: 14.6 % (ref 4–15)
NEUTROPHILS # BLD AUTO: 5.4 K/UL (ref 1.8–7.7)
NEUTROPHILS NFR BLD: 59.5 % (ref 38–73)
NRBC BLD-RTO: 0 /100 WBC
PLATELET # BLD AUTO: 351 K/UL (ref 150–350)
PMV BLD AUTO: 9.9 FL (ref 9.2–12.9)
POTASSIUM SERPL-SCNC: 3.1 MMOL/L (ref 3.5–5.1)
PROT SERPL-MCNC: 6.7 G/DL (ref 6–8.4)
RBC # BLD AUTO: 3.86 M/UL (ref 4–5.4)
SODIUM SERPL-SCNC: 139 MMOL/L (ref 136–145)
WBC # BLD AUTO: 9.06 K/UL (ref 3.9–12.7)

## 2020-10-18 PROCEDURE — 25000003 PHARM REV CODE 250: Performed by: STUDENT IN AN ORGANIZED HEALTH CARE EDUCATION/TRAINING PROGRAM

## 2020-10-18 PROCEDURE — 11000001 HC ACUTE MED/SURG PRIVATE ROOM

## 2020-10-18 PROCEDURE — 97530 THERAPEUTIC ACTIVITIES: CPT

## 2020-10-18 PROCEDURE — 63600175 PHARM REV CODE 636 W HCPCS: Performed by: INTERNAL MEDICINE

## 2020-10-18 PROCEDURE — 25000003 PHARM REV CODE 250: Performed by: INTERNAL MEDICINE

## 2020-10-18 PROCEDURE — 25000003 PHARM REV CODE 250: Performed by: HOSPITALIST

## 2020-10-18 PROCEDURE — 36415 COLL VENOUS BLD VENIPUNCTURE: CPT

## 2020-10-18 PROCEDURE — 63600175 PHARM REV CODE 636 W HCPCS: Performed by: PHYSICIAN ASSISTANT

## 2020-10-18 PROCEDURE — 97168 OT RE-EVAL EST PLAN CARE: CPT

## 2020-10-18 PROCEDURE — 63600175 PHARM REV CODE 636 W HCPCS: Performed by: STUDENT IN AN ORGANIZED HEALTH CARE EDUCATION/TRAINING PROGRAM

## 2020-10-18 PROCEDURE — 99232 PR SUBSEQUENT HOSPITAL CARE,LEVL II: ICD-10-PCS | Mod: ,,, | Performed by: STUDENT IN AN ORGANIZED HEALTH CARE EDUCATION/TRAINING PROGRAM

## 2020-10-18 PROCEDURE — 97164 PT RE-EVAL EST PLAN CARE: CPT

## 2020-10-18 PROCEDURE — 97535 SELF CARE MNGMENT TRAINING: CPT

## 2020-10-18 PROCEDURE — 80053 COMPREHEN METABOLIC PANEL: CPT

## 2020-10-18 PROCEDURE — 99232 SBSQ HOSP IP/OBS MODERATE 35: CPT | Mod: ,,, | Performed by: STUDENT IN AN ORGANIZED HEALTH CARE EDUCATION/TRAINING PROGRAM

## 2020-10-18 PROCEDURE — 63600175 PHARM REV CODE 636 W HCPCS: Performed by: HOSPITALIST

## 2020-10-18 PROCEDURE — 85025 COMPLETE CBC W/AUTO DIFF WBC: CPT

## 2020-10-18 RX ORDER — POTASSIUM CHLORIDE 750 MG/1
40 CAPSULE, EXTENDED RELEASE ORAL ONCE
Status: COMPLETED | OUTPATIENT
Start: 2020-10-18 | End: 2020-10-18

## 2020-10-18 RX ADMIN — POTASSIUM CHLORIDE 40 MEQ: 750 CAPSULE, EXTENDED RELEASE ORAL at 01:10

## 2020-10-18 RX ADMIN — VANCOMYCIN HYDROCHLORIDE 1750 MG: 10 INJECTION, POWDER, LYOPHILIZED, FOR SOLUTION INTRAVENOUS at 04:10

## 2020-10-18 RX ADMIN — ISOSORBIDE MONONITRATE 60 MG: 30 TABLET, EXTENDED RELEASE ORAL at 08:10

## 2020-10-18 RX ADMIN — DULOXETINE HYDROCHLORIDE 60 MG: 30 CAPSULE, DELAYED RELEASE ORAL at 08:10

## 2020-10-18 RX ADMIN — HYDROCODONE BITARTRATE AND ACETAMINOPHEN 1 TABLET: 10; 325 TABLET ORAL at 08:10

## 2020-10-18 RX ADMIN — PRAVASTATIN SODIUM 40 MG: 40 TABLET ORAL at 09:10

## 2020-10-18 RX ADMIN — LISINOPRIL 2.5 MG: 2.5 TABLET ORAL at 08:10

## 2020-10-18 RX ADMIN — METOPROLOL SUCCINATE 50 MG: 50 TABLET, EXTENDED RELEASE ORAL at 08:10

## 2020-10-18 RX ADMIN — CEFTRIAXONE SODIUM 2 G: 2 INJECTION, SOLUTION INTRAVENOUS at 08:10

## 2020-10-18 RX ADMIN — BISACODYL 5 MG: 5 TABLET, COATED ORAL at 08:10

## 2020-10-18 RX ADMIN — MICONAZOLE NITRATE: 20 OINTMENT TOPICAL at 09:10

## 2020-10-18 RX ADMIN — ENOXAPARIN SODIUM 40 MG: 40 INJECTION SUBCUTANEOUS at 08:10

## 2020-10-18 RX ADMIN — MICONAZOLE NITRATE: 20 OINTMENT TOPICAL at 08:10

## 2020-10-18 RX ADMIN — HYDROCODONE BITARTRATE AND ACETAMINOPHEN 1 TABLET: 10; 325 TABLET ORAL at 01:10

## 2020-10-18 RX ADMIN — ENOXAPARIN SODIUM 40 MG: 40 INJECTION SUBCUTANEOUS at 09:10

## 2020-10-18 RX ADMIN — PANTOPRAZOLE SODIUM 40 MG: 40 TABLET, DELAYED RELEASE ORAL at 05:10

## 2020-10-18 RX ADMIN — HYDROCODONE BITARTRATE AND ACETAMINOPHEN 1 TABLET: 10; 325 TABLET ORAL at 09:10

## 2020-10-18 RX ADMIN — HYDROMORPHONE HYDROCHLORIDE: 1 INJECTION, SOLUTION INTRAMUSCULAR; INTRAVENOUS; SUBCUTANEOUS at 04:10

## 2020-10-18 RX ADMIN — ASPIRIN 81 MG: 81 TABLET, COATED ORAL at 08:10

## 2020-10-18 RX ADMIN — CLOPIDOGREL 75 MG: 75 TABLET, FILM COATED ORAL at 08:10

## 2020-10-18 RX ADMIN — DONEPEZIL HYDROCHLORIDE 10 MG: 10 TABLET ORAL at 09:10

## 2020-10-18 RX ADMIN — HYDROCODONE BITARTRATE AND ACETAMINOPHEN 1 TABLET: 10; 325 TABLET ORAL at 02:10

## 2020-10-18 NOTE — PROGRESS NOTES
[facility]  Neurosurgery  Progress Note    Subjective:     History of Present Illness: Patient is a 70 year old F with a PMHx of Alzheimer's dz, HTN, HLD, CAD, YOVANI, and morbid obesity who was recently hospitalized x2 for MRSA bacteremia complicated by pneumonia and possible UTI, treated with linozolid x8 days and cipro and discharged 10/1. She was admitted to [facility] [place] 10/8 from ED, presenting for excruciating flank pain x 24 hours. Ct renal stone study negative, but revealed T9-T10 discitis. Non-contrast MRI T spine prior to transfer revealed T9-T10 discitis/osteomyelitis with vertebral bone erosion and possible adjacent soft tissue abscesses. Patient transfer 10/8, began IV vanc 10/8 and IV rocephin 10/9. On neurosx consult exam, patient is awake alert and cooperative, AOx4. 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 Patient reports nonambulatory at baseline, denies changes to her activity other than pain limitation. Neurologically intact on PEx, no evidence of motor/sensory deficit.           Post-Op Info:  * No surgery found *         Interval History: 10/18: NAEON. AFVSS. Neuro-stable. IR Bx negative.     Medications:  Continuous Infusions:  Scheduled Meds:   aspirin  81 mg Oral Daily    bisacodyL  5 mg Oral Daily    cefTRIAXone (ROCEPHIN) IVPB  2 g Intravenous Q24H    clopidogreL  75 mg Oral Daily    donepeziL  10 mg Oral QHS    DULoxetine  60 mg Oral Daily    enoxaparin  40 mg Subcutaneous Q12H    isosorbide mononitrate  60 mg Oral Daily    lisinopriL  2.5 mg Oral Daily    metoprolol succinate  50 mg Oral Daily    miconazole nitrate 2%   Topical (Top) BID    pantoprazole  40 mg Oral Before breakfast    potassium bicarbonate  50 mEq  Oral Once    pravastatin  40 mg Oral QHS    vancomycin (VANCOCIN) IVPB  1,750 mg Intravenous Q24H     PRN Meds:albuterol-ipratropium, bisacodyL, glucose, glucose, hydrALAZINE, HYDROcodone-acetaminophen, HYDROmorphone, melatonin, ondansetron, prochlorperazine, sodium chloride 0.9%, Pharmacy to dose Vancomycin consult **AND** vancomycin - pharmacy to dose     Review of Systems  Objective:     Weight: (!) 137 kg (302 lb)  Body mass index is 43.33 kg/m².  Vital Signs (Most Recent):  Temp: 98.6 °F (37 °C) (10/18/20 0406)  Pulse: 68 (10/18/20 0746)  Resp: 18 (10/18/20 0838)  BP: (!) 155/73 (10/18/20 0746)  SpO2: (!) 91 % (10/18/20 0746) Vital Signs (24h Range):  Temp:  [97 °F (36.1 °C)-98.6 °F (37 °C)] 98.6 °F (37 °C)  Pulse:  [64-73] 68  Resp:  [14-23] 18  SpO2:  [90 %-94 %] 91 %  BP: (137-157)/(65-74) 155/73                     Female External Urinary Catheter 10/07/20 1700 (Active)   Skin perineum cleansed w/ soap and water 10/18/20 1000   Tolerance no signs/symptoms of discomfort 10/18/20 1000   Suction Continuous suction at 70 mmHg 10/18/20 1000   Date of last wick change 10/17/20 10/18/20 1000   Time of last wick change 1800 10/15/20 2000   Output (mL) 500 mL 10/17/20 1700       Neurosurgery Physical Exam     General: obese, no distress.   Head: normocephalic, atraumatic  Neck: No tracheal deviation.   Neurologic: Alert and oriented. Thought content appropriate.  GCS: Motor: 6/Verbal: 5/Eyes: 4 GCS Total: 15  Mental Status: Awake, Alert, Oriented x 4  Language: No aphasia  Speech: No dysarthria  Cranial nerves: face symmetric, tongue midline, CN II-XII grossly intact.   Eyes: pupils equal, round, reactive to light with accomodation, EOMI.  Ears: No drainage.   Pulmonary: normal respirations, no signs of respiratory distress, nasal cannula in place.   Sensory: intact to light touch throughout  Motor Strength: Moves all extremities spontaneously with good tone. Proximal BLE strength exam limited 2/2 body habitus and  bed positioning. Grossly full strength upper and lower extremities. No abnormal movements seen.      Strength   Deltoids Triceps Biceps Wrist Extension Wrist Flexion Hand    Upper: R 5/5 5/5 5/5 5/5 5/5 5/5     L 5/5 5/5 5/5 5/5 5/5 5/5       Iliopsoas Quadriceps Knee  Flexion Tibialis  anterior Gastro- cnemius EHL   Lower: R 5/5 5/5 5/5 5/5 5/5 5/5     L 5/5 5/5 5/5 5/5 5/5 5/5      Clonus: absent  Skin: Skin is warm, dry and intact.      Significant Labs:  Recent Labs   Lab 10/17/20  0351 10/18/20  0358   GLU 74 88    139   K 3.6 3.1*   CL 95 95   CO2 32* 36*   BUN 7* 10   CREATININE 0.7 0.7   CALCIUM 9.7 9.8     Recent Labs   Lab 10/17/20  0351 10/18/20  0358   WBC 8.12 9.06   HGB 10.4* 10.4*   HCT 35.9* 36.3*    351*     No results for input(s): LABPT, INR, APTT in the last 48 hours.  Microbiology Results (last 7 days)     Procedure Component Value Units Date/Time    Culture, Anaerobe [[id number]] Collected: 10/16/20 1228    Order Status: Completed Specimen: Body Fluid from Back Updated: 10/18/20 1053     Anaerobic Culture Culture in progress    Narrative:      T9-T10    AFB Culture & Smear [[id number]] Collected: 10/16/20 1228    Order Status: Completed Specimen: Body Fluid from Back Updated: 10/17/20 2127     AFB Culture & Smear Culture in progress    Narrative:      T9-T10    Aerobic culture [[id number]] Collected: 10/16/20 1228    Order Status: Completed Specimen: Body Fluid from Back Updated: 10/17/20 1037     Aerobic Bacterial Culture No growth    Narrative:      T9-T10    Gram stain [[id number]] Collected: 10/16/20 1228    Order Status: Completed Specimen: Body Fluid from Back Updated: 10/16/20 1548     Gram Stain Result No WBC's      No organisms seen    Narrative:      T9-T10    Fungus culture [[id number]] Collected: 10/16/20 1228    Order Status: Sent Specimen: Body Fluid from Back Updated: 10/16/20 1515    Blood culture [[id number]] Collected: 10/08/20 2345    Order Status: Completed  Specimen: Blood Updated: 10/14/20 0612     Blood Culture, Routine No growth after 5 days.    Blood culture [[id number]] Collected: 10/08/20 2344    Order Status: Completed Specimen: Blood Updated: 10/14/20 0612     Blood Culture, Routine No growth after 5 days.        All pertinent labs from the last 24 hours have been reviewed.    Significant Diagnostics:  I have reviewed all pertinent imaging results/findings within the past 24 hours.   No results found in the last 24 hours.      Assessment/Plan:     * Discitis  Martina Betancourt is a 70 y.o. female with recent MRSA bacteremia who presents with new onselt back pain, found to haveT9-T10 discitis with bone erosion and possible adjacent soft tissue abscesses.     Neurologically intact on exam.     - All labs and imaging personally reviewed  - q4 neuro checks  - MRI w wo contrast of thoracic and lumbar spine shows discitis/osteomyelitis at T9-T10 with T8-T10 phlegmon. There is a bilateral spondylolysis at L5 and central canal narrowing from L3-L5.   - CT thoracic/lumbar spine shows T9-T10 discitis/osteomyelitis. No retropulsion, no focal kyphosis.   - No emergent neurosurgical intervention indicated at this time.  - IR Bx of T9/T10 disc space completed yesterday. No organisms seen; cx NGTD.  - ID: Recent hospitalization for MRSA bacteremia, likely infectious cause. Blood cultures 10/8 NGTD. WBC wnl, afebrile.    - ID recommending vanc + rocephin anticipated for 6-8 weeks.   - Constipation: Recommend escalate bowel regimen for c/o constipation   - Bilateral pleural effusions: seen on CT lumbar spine. On 3L NC. ID recommending pulm consult for diagnostic/therapeutic thoracentesis.  - Please notify neurosx for any changes in neuro exam. We will follow peripherally.     Plan d/w Dr. Thania Marte MD  Neurosurgery  [facility]

## 2020-10-18 NOTE — PLAN OF CARE
Re-eval complete and POC established.    Gissell Holloway, PT, DPT  10/18/2020      Problem: Physical Therapy Goal  Goal: Physical Therapy Goal  Description: Goals to be met by: 2020    Patient will increase functional independence with mobility by performin. Pt will perform bed mobility (rolling L/R, scooting, and bridging) with Rose.  2. Pt will perform supine to/from sit with Rose.  3. Pt will sit EOB x 10 mins with no UE support with min assistance.  4. Pt will perform sit to stand with max assistance and RW.  5. Pt will perform there-ex from handout x 15 reps to improve strength for functional mobility.        Outcome: Ongoing, Progressing

## 2020-10-18 NOTE — SUBJECTIVE & OBJECTIVE
Interval History: 10/18: NAEON. AFVSS. Neuro-stable. IR Bx negative.     Medications:  Continuous Infusions:  Scheduled Meds:   aspirin  81 mg Oral Daily    bisacodyL  5 mg Oral Daily    cefTRIAXone (ROCEPHIN) IVPB  2 g Intravenous Q24H    clopidogreL  75 mg Oral Daily    donepeziL  10 mg Oral QHS    DULoxetine  60 mg Oral Daily    enoxaparin  40 mg Subcutaneous Q12H    isosorbide mononitrate  60 mg Oral Daily    lisinopriL  2.5 mg Oral Daily    metoprolol succinate  50 mg Oral Daily    miconazole nitrate 2%   Topical (Top) BID    pantoprazole  40 mg Oral Before breakfast    potassium bicarbonate  50 mEq Oral Once    pravastatin  40 mg Oral QHS    vancomycin (VANCOCIN) IVPB  1,750 mg Intravenous Q24H     PRN Meds:albuterol-ipratropium, bisacodyL, glucose, glucose, hydrALAZINE, HYDROcodone-acetaminophen, HYDROmorphone, melatonin, ondansetron, prochlorperazine, sodium chloride 0.9%, Pharmacy to dose Vancomycin consult **AND** vancomycin - pharmacy to dose     Review of Systems  Objective:     Weight: (!) 137 kg (302 lb)  Body mass index is 43.33 kg/m².  Vital Signs (Most Recent):  Temp: 98.6 °F (37 °C) (10/18/20 0406)  Pulse: 68 (10/18/20 0746)  Resp: 18 (10/18/20 0838)  BP: (!) 155/73 (10/18/20 0746)  SpO2: (!) 91 % (10/18/20 0746) Vital Signs (24h Range):  Temp:  [97 °F (36.1 °C)-98.6 °F (37 °C)] 98.6 °F (37 °C)  Pulse:  [64-73] 68  Resp:  [14-23] 18  SpO2:  [90 %-94 %] 91 %  BP: (137-157)/(65-74) 155/73                     Female External Urinary Catheter 10/07/20 1700 (Active)   Skin perineum cleansed w/ soap and water 10/18/20 1000   Tolerance no signs/symptoms of discomfort 10/18/20 1000   Suction Continuous suction at 70 mmHg 10/18/20 1000   Date of last wick change 10/17/20 10/18/20 1000   Time of last wick change 1800 10/15/20 2000   Output (mL) 500 mL 10/17/20 1700       Neurosurgery Physical Exam     General: obese, no distress.   Head: normocephalic, atraumatic  Neck: No tracheal deviation.    Neurologic: Alert and oriented. Thought content appropriate.  GCS: Motor: 6/Verbal: 5/Eyes: 4 GCS Total: 15  Mental Status: Awake, Alert, Oriented x 4  Language: No aphasia  Speech: No dysarthria  Cranial nerves: face symmetric, tongue midline, CN II-XII grossly intact.   Eyes: pupils equal, round, reactive to light with accomodation, EOMI.  Ears: No drainage.   Pulmonary: normal respirations, no signs of respiratory distress, nasal cannula in place.   Sensory: intact to light touch throughout  Motor Strength: Moves all extremities spontaneously with good tone. Proximal BLE strength exam limited 2/2 body habitus and bed positioning. Grossly full strength upper and lower extremities. No abnormal movements seen.      Strength   Deltoids Triceps Biceps Wrist Extension Wrist Flexion Hand    Upper: R 5/5 5/5 5/5 5/5 5/5 5/5     L 5/5 5/5 5/5 5/5 5/5 5/5       Iliopsoas Quadriceps Knee  Flexion Tibialis  anterior Gastro- cnemius EHL   Lower: R 5/5 5/5 5/5 5/5 5/5 5/5     L 5/5 5/5 5/5 5/5 5/5 5/5      Clonus: absent  Skin: Skin is warm, dry and intact.      Significant Labs:  Recent Labs   Lab 10/17/20  0351 10/18/20  0358   GLU 74 88    139   K 3.6 3.1*   CL 95 95   CO2 32* 36*   BUN 7* 10   CREATININE 0.7 0.7   CALCIUM 9.7 9.8     Recent Labs   Lab 10/17/20  0351 10/18/20  0358   WBC 8.12 9.06   HGB 10.4* 10.4*   HCT 35.9* 36.3*    351*     No results for input(s): LABPT, INR, APTT in the last 48 hours.  Microbiology Results (last 7 days)     Procedure Component Value Units Date/Time    Culture, Anaerobe [[id number]] Collected: 10/16/20 1228    Order Status: Completed Specimen: Body Fluid from Back Updated: 10/18/20 1053     Anaerobic Culture Culture in progress    Narrative:      T9-T10    AFB Culture & Smear [[id number]] Collected: 10/16/20 1228    Order Status: Completed Specimen: Body Fluid from Back Updated: 10/17/20 2127     AFB Culture & Smear Culture in progress    Narrative:      T9-T10     Aerobic culture [[id number]] Collected: 10/16/20 1228    Order Status: Completed Specimen: Body Fluid from Back Updated: 10/17/20 1037     Aerobic Bacterial Culture No growth    Narrative:      T9-T10    Gram stain [[id number]] Collected: 10/16/20 1228    Order Status: Completed Specimen: Body Fluid from Back Updated: 10/16/20 1548     Gram Stain Result No WBC's      No organisms seen    Narrative:      T9-T10    Fungus culture [[id number]] Collected: 10/16/20 1228    Order Status: Sent Specimen: Body Fluid from Back Updated: 10/16/20 1515    Blood culture [[id number]] Collected: 10/08/20 2345    Order Status: Completed Specimen: Blood Updated: 10/14/20 0612     Blood Culture, Routine No growth after 5 days.    Blood culture [[id number]] Collected: 10/08/20 2344    Order Status: Completed Specimen: Blood Updated: 10/14/20 0612     Blood Culture, Routine No growth after 5 days.        All pertinent labs from the last 24 hours have been reviewed.    Significant Diagnostics:  I have reviewed all pertinent imaging results/findings within the past 24 hours.   No results found in the last 24 hours.

## 2020-10-18 NOTE — ASSESSMENT & PLAN NOTE
Martina Betancourt is a 70 y.o. female with recent MRSA bacteremia who presents with new onselt back pain, found to haveT9-T10 discitis with bone erosion and possible adjacent soft tissue abscesses.     Neurologically intact on exam.     - All labs and imaging personally reviewed  - q4 neuro checks  - MRI w wo contrast of thoracic and lumbar spine shows discitis/osteomyelitis at T9-T10 with T8-T10 phlegmon. There is a bilateral spondylolysis at L5 and central canal narrowing from L3-L5.   - CT thoracic/lumbar spine shows T9-T10 discitis/osteomyelitis. No retropulsion, no focal kyphosis.   - No emergent neurosurgical intervention indicated at this time.  - IR Bx of T9/T10 disc space completed yesterday. No organisms seen; cx NGTD.  - ID: Recent hospitalization for MRSA bacteremia, likely infectious cause. Blood cultures 10/8 NGTD. WBC wnl, afebrile.    - ID recommending vanc + rocephin anticipated for 6-8 weeks.   - Constipation: Recommend escalate bowel regimen for c/o constipation   - Bilateral pleural effusions: seen on CT lumbar spine. On 3L NC. ID recommending pulm consult for diagnostic/therapeutic thoracentesis.  - Please notify neurosx for any changes in neuro exam. We will follow peripherally.     Plan d/w Dr. Monteiro

## 2020-10-18 NOTE — PLAN OF CARE
Problem: Adult Inpatient Plan of Care  Goal: Plan of Care Review  Outcome: Ongoing, Progressing  Goal: Optimal Comfort and Wellbeing  Outcome: Ongoing, Progressing  Intervention: Provide Person-Centered Care  Flowsheets (Taken 10/18/2020 0305)  Trust Relationship/Rapport:   care explained   choices provided   emotional support provided   empathic listening provided   questions answered   questions encouraged   reassurance provided   thoughts/feelings acknowledged     Problem: Bariatric Environmental Safety  Goal: Safety Maintained with Care  Outcome: Ongoing, Progressing     Problem: Infection  Goal: Infection Symptom Resolution  Outcome: Ongoing, Progressing  Intervention: Prevent or Manage Infection  Flowsheets (Taken 10/18/2020 0305)  Fever Reduction/Comfort Measures:   lightweight bedding   lightweight clothing     Problem: Skin Injury Risk Increased  Goal: Skin Health and Integrity  Outcome: Ongoing, Progressing  Intervention: Optimize Skin Protection  Flowsheets (Taken 10/18/2020 0305)  Pressure Reduction Techniques:   frequent weight shift encouraged   weight shift assistance provided  Head of Bed (HOB): HOB lowered  VSS. Patient resting well throughout the night.  Pt requested sandwich and 2 puddings before falling off to sleep tonight.  Comfort promoted with he use of pain medication as ordered and requested by patient and frequent weight shifting.  Pt has had an uneventful night and has rested well with safety continued to be maintained.

## 2020-10-18 NOTE — PT/OT/SLP RE-EVAL
Occupational Therapy   Qd-[id number] and Treatment    Name: Martina Betancourt  MRN: [id number]  Admitting Diagnosis:  Discitis      Recommendations:     Discharge Recommendations: nursing facility, skilled  Discharge Equipment Recommendations:  (TBD)  Barriers to discharge:  Decreased caregiver support and increased assistance needed    Assessment:     Martina Betancourt is a 70 y.o. female with a medical diagnosis of Discitis.  She presents with performance deficits including weakness, impaired endurance, impaired self care skills, impaired functional mobilty, impaired balance, decreased safety awareness, pain, decreased lower extremity function, decreased upper extremity function. Pt willing to participate but limited by pain with minimal movement. Pt would continue to benefit from OT to increase functional independence and safety. Recommend SNF upon D/C.    Rehab Prognosis: Good; patient would benefit from acute skilled OT services to address these deficits and reach maximum level of function.       Plan:     Patient to be seen 3 x/week to address the above listed problems via self-care/home management, therapeutic activities, therapeutic exercises, neuromuscular re-education  · Plan of Care Expires: 11/18/20  · Plan of Care Reviewed with: patient    Subjective     Chief Complaint: Pain  Patient/Family stated goals: Return to PLOF  Communicated with: RN prior to session.  Pain/Comfort:  · Pain Rating 1: (Did not rate)  · Location 1: back(and R side)  · Pain Addressed 1: Reposition, Distraction, Cessation of Activity  · Pain Rating Post-Intervention 1: (remained throughout)    Objective:     Communicated with: RN prior to session. Patient found with HOB elevated with: telemetry, pulse ox (continuous), peripheral IV, PureWick, bed alarm, oxygen upon OT entry to room, no family present.    General Precautions: Standard, fall, contact   Orthopedic Precautions:N/A   Braces: N/A     Occupational Performance:    Bed  Mobility:    · Patient completed Rolling/Turning to Left with maximal assistance and .2 persons  · Patient completed Rolling/Turning to Right with maximal assistance and 2 persons  · Patient completed Scooting/Bridging with total assistance and 2 persons  · Patient completed Supine to Sit with maximal assistance and 2 persons  · Patient completed Sit to Supine with maximal assistance and 2 persons    Functional Mobility/Transfers:  · Unable to attempt-- pt tolerated sitting EOB ~20 seconds and reports nausea/increased pain and requesting to return to supine    Activities of Daily Living:  · Lower Body Dressing: total assistance to change socks  · Toileting: total assistance in supine/sidelying for sandee hygiene and to change pads    Cognitive/Visual Perceptual:  Cognitive/Psychosocial Skills:     -       Oriented to: Person, Place, Time and Situation   -       Follows Commands/attention: Follows multistep commands  -       Communication: clear/fluent  -       Safety awareness/insight to disability: intact   Visual/Perceptual:      -Intact      Physical Exam:  Balance:    -       impaired sitting balance due to back pain; tolerated sitting EOB briefly  Upper Extremity Range of Motion:     -       Right Upper Extremity: Deficits: shld AROM minimal at baseline due to previous injury, otherwise WFL  -       Left Upper Extremity: WFL  Upper Extremity Strength:    -       Right Upper Extremity: shld 2/5; others WFL  -       Left Upper Extremity: WFL   Strength:    -       Right Upper Extremity: WFL  -       Left Upper Extremity: WFL  Fine Motor Coordination:    -       Intact    AMPAC 6 Click:  AMPAC Total Score: 9    Treatment & Education:  Education:  OT re-eval; educated on OT role and POC and to call or assistance    Patient left HOB elevated with all lines intact and call button in reach    GOALS:   Multidisciplinary Problems     Occupational Therapy Goals        Problem: Occupational Therapy Goal    Goal Priority  Disciplines Outcome Interventions   Occupational Therapy Goal     OT, PT/OT Ongoing, Progressing    Description: Updated Goals to be met by: 10/25/2020     Patient will increase functional independence with ADLs by performing:    Feeding with Minimal Assistance seated EOB.  UE Dressing with Minimal Assistance seated EOB.  Grooming while EOB with Minimal Assistance.  Rolling to Bilateral with Moderate Assistance.   Supine to sit with Moderate Assistance.  Stand pivot to BSC with Moderate Assistance.      Goals to be met by: 10/16/2020     Patient will increase functional independence with ADLs by performing:    Feeding with Minimal Assistance seated EOB.  UE Dressing with Minimal Assistance seated EOB.  Grooming while EOB with Minimal Assistance.  Bathing from edge of bed with wipes with Minimal Assistance.  Rolling to Bilateral with Moderate Assistance.   Supine to sit with Moderate Assistance.                     History:     Past Medical History:   Diagnosis Date    Alzheimer disease     Alzheimer disease     Coronary artery disease     Hyperlipidemia     Hypertension     Myopathy     Non-ST elevation (NSTEMI) myocardial infarction     Obesity     Pneumonia     Sleep apnea        Past Surgical History:   Procedure Laterality Date    APPENDECTOMY      APPENDECTOMY      CHOLECYSTECTOMY      CORONARY STENT PLACEMENT      HYSTERECTOMY      TONSILLECTOMY         Time Tracking:     OT Date of Treatment: 10/18/20  OT Start Time: 1110  OT Stop Time: 1128  OT Total Time (min): 18 min    Billable Minutes:Re-eval 10  Self Care/Home Management 8    ELE Bravo  10/18/2020

## 2020-10-18 NOTE — PLAN OF CARE
Problem: Infection  Goal: Infection Symptom Resolution  Outcome: Ongoing, Progressing     Problem: Adult Inpatient Plan of Care  Goal: Plan of Care Review  Outcome: Ongoing, Progressing    Problem: Wound  Goal: Optimal Wound Healing  Outcome: Ongoing, Progressing    Patient and family educated on the above POC.  Both verbalized.  Patient denies SOB or distress.   Vital signs stable at present.   Patient medicated with Norco 10/325 mg at 0830 and 1430.  Patient kaiden well and expressed mild relief.  Will cont to monitor pain, SOB and distress.

## 2020-10-18 NOTE — PT/OT/SLP RE-EVAL
"Physical Therapy Re-evaluation    Patient Name:  Martina Betancourt   MRN:  [id number]    Recommendations:     Discharge Recommendations:  nursing facility, skilled   Discharge Equipment Recommendations: other (see comments)(TBD by next level of care)   Barriers to discharge: None    Assessment:     Martina Betancourt is a 70 y.o. female admitted with a medical diagnosis of Discitis.  She presents with the following impairments/functional limitations:  weakness, impaired endurance, impaired self care skills, impaired functional mobilty, gait instability, impaired balance, edema, pain, impaired skin, decreased coordination, decreased upper extremity function, decreased lower extremity function. PT recommending SNF upon DC from hospital.    Rehab Prognosis:  Fair; patient would benefit from acute skilled PT services to address these deficits and reach maximum level of function.      Recent Surgery: * No surgery found *      Plan:     During this hospitalization, patient to be seen 3 x/week to address the above listed problems via gait training, therapeutic activities, therapeutic exercises, neuromuscular re-education  · Plan of Care Expires:  11/17/20   Plan of Care Reviewed with: patient    Subjective     Communicated with Rn prior to session.  Patient found HOB elevated with telemetry, pulse ox (continuous), peripheral IV, PureWick, oxygen, bed alarm upon PT entry to room, agreeable to evaluation.      Chief Complaint: back pain  Patient comments/goals: "I can try" - pt's response to therapist wanting to attempt sitting on the EOB  Pain/Comfort:  · Pain Rating 1: other (see comments)(not rated, but pt unable to tolerate sitting EOB >20 seconds)  · Location - Side 1: Bilateral  · Location - Orientation 1: generalized  · Location 1: back  · Pain Addressed 1: Distraction, Cessation of Activity, Nurse notified  · Pain Rating Post-Intervention 1: other (see comments)(not rated, but pain remained)    Patients cultural, " spiritual, [employer] conflicts given the current situation: no      Objective:     Patient found with: telemetry, pulse ox (continuous), peripheral IV, PureWick, oxygen, bed alarm   Additional staffing present: OT  General Precautions: Standard, contact, fall   Orthopedic Precautions:N/A   Braces: N/A     Exams:  · Gross Motor Coordination:  WFL  · Postural Exam:  Patient presented with the following abnormalities:    · -       Rounded shoulders  · -       Forward head  · RLE ROM: Deficits: decreased/limited by body habitus  · RLE Strength: Deficits: grossly 2 to 3+/5  · LLE ROM: Deficits: decreased/limited by body habitus  · LLE Strength: deficits: grossly 2 to 3+/5    Functional Mobility:  · Bed Mobility:     · Rolling Left:  maximal assistance and of 2 persons  · Rolling Right: maximal assistance and of 2 persons  · Scooting: total assistance and of 2 persons to HOB  · Supine to Sit: maximal assistance and of 2 persons, significant pain elicited, pt able to tolerate ~20 seconds before complaining of feeling like she was about to vomit. Pt returned to bed level.  · Sit to Supine: maximal assistance and of 2 persons  · Transfers:  Deferred, pt unable to tolerate sitting EOB  · Balance: max A for sitting EOB ~20 seconds    AM-PAC 6 CLICK MOBILITY  Total Score:8       Therapeutic Activities and Exercises:   Patient educated on role of therapy, goals of session, and benefits of mobilizing.   Discussed PT plan of care during hospitalization.   Patient educated on calling for assistance.   Patient educated on how their diagnosis impacts their mobility within PT scope of practice.   Communication board up to date.  All questions answered within PT scope of practice.    Pt safe for bed level mobility with nursing staff.      Patient left HOB elevated with all lines intact, call button in reach, bed alarm on and RN notified.    GOALS:   Multidisciplinary Problems     Physical Therapy Goals        Problem: Physical Therapy  Goal    Goal Priority Disciplines Outcome Goal Variances Interventions   Physical Therapy Goal     PT, PT/OT Ongoing, Progressing     Description: Goals to be met by: 2020    Patient will increase functional independence with mobility by performin. Pt will perform bed mobility (rolling L/R, scooting, and bridging) with oRse.  2. Pt will perform supine to/from sit with Rose.  3. Pt will sit EOB x 10 mins with no UE support with min assistance.  4. Pt will perform sit to stand with max assistance and RW.  5. Pt will perform there-ex from handout x 15 reps to improve strength for functional mobility.                         History:     Past Medical History:   Diagnosis Date    Alzheimer disease     Alzheimer disease     Coronary artery disease     Hyperlipidemia     Hypertension     Myopathy     Non-ST elevation (NSTEMI) myocardial infarction     Obesity     Pneumonia     Sleep apnea        Past Surgical History:   Procedure Laterality Date    APPENDECTOMY      APPENDECTOMY      CHOLECYSTECTOMY      CORONARY STENT PLACEMENT      HYSTERECTOMY      TONSILLECTOMY         Time Tracking:     PT Received On: 10/18/20  PT Start Time: 1110     PT Stop Time: 1129  PT Total Time (min): 19 min     Billable Minutes: Re-eval 11 and Therapeutic Activity 8      Gissell Holloway, PT  10/18/2020

## 2020-10-19 LAB
ALBUMIN SERPL BCP-MCNC: 2.2 G/DL (ref 3.5–5.2)
ALP SERPL-CCNC: 99 U/L (ref 55–135)
ALT SERPL W/O P-5'-P-CCNC: 5 U/L (ref 10–44)
ANION GAP SERPL CALC-SCNC: 7 MMOL/L (ref 8–16)
AST SERPL-CCNC: 9 U/L (ref 10–40)
BACTERIA SPEC AEROBE CULT: NO GROWTH
BASOPHILS # BLD AUTO: 0.03 K/UL (ref 0–0.2)
BASOPHILS NFR BLD: 0.4 % (ref 0–1.9)
BILIRUB SERPL-MCNC: 0.1 MG/DL (ref 0.1–1)
BUN SERPL-MCNC: 8 MG/DL (ref 8–23)
CALCIUM SERPL-MCNC: 9.7 MG/DL (ref 8.7–10.5)
CHLORIDE SERPL-SCNC: 97 MMOL/L (ref 95–110)
CO2 SERPL-SCNC: 36 MMOL/L (ref 23–29)
CREAT SERPL-MCNC: 0.8 MG/DL (ref 0.5–1.4)
DIFFERENTIAL METHOD: ABNORMAL
EOSINOPHIL # BLD AUTO: 0.1 K/UL (ref 0–0.5)
EOSINOPHIL NFR BLD: 1.5 % (ref 0–8)
ERYTHROCYTE [DISTWIDTH] IN BLOOD BY AUTOMATED COUNT: 15.6 % (ref 11.5–14.5)
EST. GFR  (AFRICAN AMERICAN): >60 ML/MIN/1.73 M^2
EST. GFR  (NON AFRICAN AMERICAN): >60 ML/MIN/1.73 M^2
GLUCOSE SERPL-MCNC: 134 MG/DL (ref 70–110)
HCT VFR BLD AUTO: 35.5 % (ref 37–48.5)
HGB BLD-MCNC: 10.2 G/DL (ref 12–16)
IMM GRANULOCYTES # BLD AUTO: 0.03 K/UL (ref 0–0.04)
IMM GRANULOCYTES NFR BLD AUTO: 0.4 % (ref 0–0.5)
LYMPHOCYTES # BLD AUTO: 1.8 K/UL (ref 1–4.8)
LYMPHOCYTES NFR BLD: 20.9 % (ref 18–48)
MCH RBC QN AUTO: 26.9 PG (ref 27–31)
MCHC RBC AUTO-ENTMCNC: 28.7 G/DL (ref 32–36)
MCV RBC AUTO: 94 FL (ref 82–98)
MONOCYTES # BLD AUTO: 1.3 K/UL (ref 0.3–1)
MONOCYTES NFR BLD: 15.6 % (ref 4–15)
NEUTROPHILS # BLD AUTO: 5.2 K/UL (ref 1.8–7.7)
NEUTROPHILS NFR BLD: 61.2 % (ref 38–73)
NRBC BLD-RTO: 0 /100 WBC
PLATELET # BLD AUTO: 332 K/UL (ref 150–350)
PMV BLD AUTO: 10.4 FL (ref 9.2–12.9)
POTASSIUM SERPL-SCNC: 3.2 MMOL/L (ref 3.5–5.1)
PROT SERPL-MCNC: 6.7 G/DL (ref 6–8.4)
RBC # BLD AUTO: 3.79 M/UL (ref 4–5.4)
SODIUM SERPL-SCNC: 140 MMOL/L (ref 136–145)
VANCOMYCIN TROUGH SERPL-MCNC: 20.5 UG/ML (ref 10–22)
WBC # BLD AUTO: 8.51 K/UL (ref 3.9–12.7)

## 2020-10-19 PROCEDURE — 25000003 PHARM REV CODE 250: Performed by: STUDENT IN AN ORGANIZED HEALTH CARE EDUCATION/TRAINING PROGRAM

## 2020-10-19 PROCEDURE — 80202 ASSAY OF VANCOMYCIN: CPT

## 2020-10-19 PROCEDURE — 25000003 PHARM REV CODE 250: Performed by: INTERNAL MEDICINE

## 2020-10-19 PROCEDURE — 99233 SBSQ HOSP IP/OBS HIGH 50: CPT | Mod: ,,, | Performed by: PHYSICIAN ASSISTANT

## 2020-10-19 PROCEDURE — 76937 US GUIDE VASCULAR ACCESS: CPT

## 2020-10-19 PROCEDURE — C1751 CATH, INF, PER/CENT/MIDLINE: HCPCS

## 2020-10-19 PROCEDURE — 63600175 PHARM REV CODE 636 W HCPCS: Performed by: INTERNAL MEDICINE

## 2020-10-19 PROCEDURE — 99232 SBSQ HOSP IP/OBS MODERATE 35: CPT | Mod: ,,, | Performed by: STUDENT IN AN ORGANIZED HEALTH CARE EDUCATION/TRAINING PROGRAM

## 2020-10-19 PROCEDURE — 36415 COLL VENOUS BLD VENIPUNCTURE: CPT

## 2020-10-19 PROCEDURE — A4216 STERILE WATER/SALINE, 10 ML: HCPCS | Performed by: STUDENT IN AN ORGANIZED HEALTH CARE EDUCATION/TRAINING PROGRAM

## 2020-10-19 PROCEDURE — 80053 COMPREHEN METABOLIC PANEL: CPT

## 2020-10-19 PROCEDURE — 63600175 PHARM REV CODE 636 W HCPCS: Performed by: STUDENT IN AN ORGANIZED HEALTH CARE EDUCATION/TRAINING PROGRAM

## 2020-10-19 PROCEDURE — 99233 PR SUBSEQUENT HOSPITAL CARE,LEVL III: ICD-10-PCS | Mod: ,,, | Performed by: PHYSICIAN ASSISTANT

## 2020-10-19 PROCEDURE — 85025 COMPLETE CBC W/AUTO DIFF WBC: CPT

## 2020-10-19 PROCEDURE — 36573 INSJ PICC RS&I 5 YR+: CPT

## 2020-10-19 PROCEDURE — 25000003 PHARM REV CODE 250: Performed by: HOSPITALIST

## 2020-10-19 PROCEDURE — 99232 PR SUBSEQUENT HOSPITAL CARE,LEVL II: ICD-10-PCS | Mod: ,,, | Performed by: STUDENT IN AN ORGANIZED HEALTH CARE EDUCATION/TRAINING PROGRAM

## 2020-10-19 PROCEDURE — 11000001 HC ACUTE MED/SURG PRIVATE ROOM

## 2020-10-19 RX ORDER — SODIUM CHLORIDE 0.9 % (FLUSH) 0.9 %
10 SYRINGE (ML) INJECTION
Status: DISCONTINUED | OUTPATIENT
Start: 2020-10-19 | End: 2020-10-20 | Stop reason: HOSPADM

## 2020-10-19 RX ORDER — POTASSIUM CHLORIDE 750 MG/1
40 CAPSULE, EXTENDED RELEASE ORAL ONCE
Status: COMPLETED | OUTPATIENT
Start: 2020-10-19 | End: 2020-10-19

## 2020-10-19 RX ORDER — SODIUM CHLORIDE 0.9 % (FLUSH) 0.9 %
10 SYRINGE (ML) INJECTION EVERY 6 HOURS
Status: DISCONTINUED | OUTPATIENT
Start: 2020-10-19 | End: 2020-10-20 | Stop reason: HOSPADM

## 2020-10-19 RX ADMIN — MICONAZOLE NITRATE: 20 OINTMENT TOPICAL at 09:10

## 2020-10-19 RX ADMIN — BISACODYL 5 MG: 5 TABLET, COATED ORAL at 09:10

## 2020-10-19 RX ADMIN — DULOXETINE HYDROCHLORIDE 60 MG: 30 CAPSULE, DELAYED RELEASE ORAL at 09:10

## 2020-10-19 RX ADMIN — ASPIRIN 81 MG: 81 TABLET, COATED ORAL at 09:10

## 2020-10-19 RX ADMIN — CLOPIDOGREL 75 MG: 75 TABLET, FILM COATED ORAL at 09:10

## 2020-10-19 RX ADMIN — ISOSORBIDE MONONITRATE 60 MG: 30 TABLET, EXTENDED RELEASE ORAL at 09:10

## 2020-10-19 RX ADMIN — Medication 10 ML: at 01:10

## 2020-10-19 RX ADMIN — ENOXAPARIN SODIUM 40 MG: 40 INJECTION SUBCUTANEOUS at 09:10

## 2020-10-19 RX ADMIN — PRAVASTATIN SODIUM 40 MG: 40 TABLET ORAL at 09:10

## 2020-10-19 RX ADMIN — HYDROCODONE BITARTRATE AND ACETAMINOPHEN 1 TABLET: 10; 325 TABLET ORAL at 09:10

## 2020-10-19 RX ADMIN — LISINOPRIL 2.5 MG: 2.5 TABLET ORAL at 09:10

## 2020-10-19 RX ADMIN — Medication 10 ML: at 06:10

## 2020-10-19 RX ADMIN — HYDROCODONE BITARTRATE AND ACETAMINOPHEN 1 TABLET: 10; 325 TABLET ORAL at 01:10

## 2020-10-19 RX ADMIN — HYDROCODONE BITARTRATE AND ACETAMINOPHEN 1 TABLET: 10; 325 TABLET ORAL at 04:10

## 2020-10-19 RX ADMIN — DONEPEZIL HYDROCHLORIDE 10 MG: 10 TABLET ORAL at 09:10

## 2020-10-19 RX ADMIN — VANCOMYCIN HYDROCHLORIDE 1750 MG: 10 INJECTION, POWDER, LYOPHILIZED, FOR SOLUTION INTRAVENOUS at 06:10

## 2020-10-19 RX ADMIN — PANTOPRAZOLE SODIUM 40 MG: 40 TABLET, DELAYED RELEASE ORAL at 05:10

## 2020-10-19 RX ADMIN — POTASSIUM CHLORIDE 40 MEQ: 750 CAPSULE, EXTENDED RELEASE ORAL at 09:10

## 2020-10-19 RX ADMIN — METOPROLOL SUCCINATE 50 MG: 50 TABLET, EXTENDED RELEASE ORAL at 09:10

## 2020-10-19 NOTE — PROCEDURES
"Martina Betancourt is a 70 y.o. female patient.    Temp: 98.1 °F (36.7 °C) (10/19/20 0810)  Pulse: 68 (10/19/20 0810)  Resp: 19 (10/19/20 0810)  BP: (!) 150/77 (10/19/20 0810)  SpO2: (!) 93 % (10/19/20 0810)  Weight: (!) 137 kg (302 lb) (10/12/20 1023)  Height: 5' 10" (177.8 cm) (10/12/20 1023)    PICC  Date/Time: 10/19/2020 11:15 AM  Performed by: Kenna Cheng RN  Assisting provider: Daysi Leahy LPN  Time out: Immediately prior to procedure a time out was called to verify the correct patient, procedure, equipment, support staff and site/side marked as required  Indications: med administration and vascular access  Anesthesia: local infiltration  Local anesthetic: lidocaine 1% without epinephrine  Anesthetic Total (mL): 3  Preparation: skin prepped with ChloraPrep  Skin prep agent dried: skin prep agent completely dried prior to procedure  Sterile barriers: all five maximum sterile barriers used - cap, mask, sterile gown, sterile gloves, and large sterile sheet  Hand hygiene: hand hygiene performed prior to central venous catheter insertion  Location details: right brachial  Catheter type: double lumen  Catheter size: 5 Fr  Catheter Length: 39cm    Ultrasound guidance: yes  Vessel Caliber: medium and patent, compressibility normal  Vascular Doppler: not done  Needle advanced into vessel with real time Ultrasound guidance.  Guidewire confirmed in vessel.  Image recorded and saved.  Sterile sheath used.  Number of attempts: 1  Post-procedure: blood return through all ports, chlorhexidine patch and sterile dressing applied  Technical procedures used: 3cg  Specimens: No  Implants: No  Assessment: placement verified by x-ray  Complications: none          Daysi Leahy  10/19/2020  "

## 2020-10-19 NOTE — PLAN OF CARE
Pt. needs met. VSS throughout shift.Turned Q2.  Weight shifting encouraged. Pt had PICC line placed. TB test ordered but not placed at this time.  Pt presented with education, needs reinfocrcement. POC reviewed will continue to monitor and adjust POC.   Problem: Adult Inpatient Plan of Care  Goal: Plan of Care Review  Outcome: Ongoing, Progressing  Goal: Patient-Specific Goal (Individualization)  Outcome: Ongoing, Progressing  Goal: Absence of Hospital-Acquired Illness or Injury  Outcome: Ongoing, Progressing  Goal: Optimal Comfort and Wellbeing  Outcome: Ongoing, Progressing  Goal: Readiness for Transition of Care  Outcome: Ongoing, Progressing  Goal: Rounds/Family Conference  Outcome: Ongoing, Progressing     Problem: Bariatric Environmental Safety  Goal: Safety Maintained with Care  Outcome: Ongoing, Progressing     Problem: Infection  Goal: Infection Symptom Resolution  Outcome: Ongoing, Progressing     Problem: Wound  Goal: Optimal Wound Healing  Outcome: Ongoing, Progressing     Problem: Skin Injury Risk Increased  Goal: Skin Health and Integrity  Outcome: Ongoing, Progressing

## 2020-10-19 NOTE — ASSESSMENT & PLAN NOTE
70 year old female recently admitted to OSH with MRSA bacteremia and subsequent pneumonia treated with Zyvox x 7 days with clearance of her bacteremia now admitted with back pain found to have T9-10 osteo discitis, T8-10 epidural phlegmon with associated paraverterbral abscesses. Spine infection likely hematogenous from under treated bacteremia. Neurosurgery has been consulted - unfortunately no plans for surgical intervention at this time. She has been on Vancomycin and Ceftriaxone.  Underwent T9-10 disc space biopsy with IR on 10/16. Cultures negative, though had been on IV antibiotics multiple days prior.  Afebrile. HDS. Repeat blood cx sterile.      Plan  Recommend Vancomycin 1750 mg IV q 24 hours x 8 weeks    End date of IV antibiotics: 12/3/20    Weekly outpatient laboratory on Monday or Tuesday while on IV antibiotics.    CBC   CMP   ESR and CRP   Vancomycin trough. Target 15-20    If vancomycin trough is not at target (15-20) prior to discharge, the please perform vancomycin trough before their fourth outpatient dose.    Fax laboratory results to [facility] at [phone] with attn: Krystina Mendoza    Outpatient Infectious Diseases clinic follow up will be arranged and found in patient calendar.    Please arrange repeat MRI in 4 weeks to reassess status of her fluid collections as no surgical drainage undertaken    Prior to discharge, please ensure the patient's follow-up has been scheduled.  If there is still no follow-up scheduled in Infectious Diseases clinic, please send an EPIC message to Yvrose Mcgill in Infectious Diseases.    Discussed plan with primary team. ID will sign off.

## 2020-10-19 NOTE — PROGRESS NOTES
Hospital Medicine  Progress note    Team: [facility] MED A Shama Coronado MD  Admit Date: 10/8/2020  BRIT 10/20/2020  Length of Stay:  LOS: 11 days   Code status: Full Code    Principal Problem:  Discitis    HPI / Hospital Course     Interval hx: Received final recs from ID, planning on IV vancomycin for a total of 8 weeks. Patient is medically stable for discharge to SNF.    10/17 Micro from IR drainage in process, remains on IV abx. BP better controlled, patient having bowel movements.     10/16 Underwent IR drainage today, significantly hypertensive after. PRN hydralazine ordered.     10/15 Unable to do IR drainage of abscesses today, will plan again for tomorrow. NPO at midnight. Remains on vancomycin and ceftriaxone.     10/14 Biopsy planned for Thursday.    10/13 Zosyn stopped, rocephin started. Neurosurgery consult,II spoke to them, final recs pending,. WBC -8.2, afebrile, BC- NGTD.  MRI and CT scan of back completed, some nerve root compression noted, await NSG input.    10/09 await ID consult. VSS, WBC is 13.2. BC-NGTD    ROS     Respiratory: neg for cough neg for shortness of breath  Cardiovascular: neg for chest pain neg for palpitations  Gastrointestinal: neg for nausea neg for vomiting, neg for abdominal pain neg for diarrhea neg for constipation   Behavioral/Psych: neg for depression neg for anxiety    PEx  Temp:  [97.7 °F (36.5 °C)-98.8 °F (37.1 °C)]   Pulse:  [63-69]   Resp:  [15-20]   BP: (134-157)/(69-81)   SpO2:  [86 %-96 %]     Intake/Output Summary (Last 24 hours) at 10/19/2020 1627  Last data filed at 10/18/2020 1628  Gross per 24 hour   Intake 740 ml   Output 300 ml   Net 440 ml       General Appearance: no acute distress   Heart: regular rate and rhythm  Respiratory: Normal respiratory effort, no crackles   Abdomen: Soft, non-tender; bowel sounds active  Skin: intact.Skin intact  Neurologic:  No focal numbness or weakness  Mental status: Alert, oriented x 4, affect appropriate     Recent  Labs   Lab 10/17/20  0351 10/18/20  0358 10/19/20  0252   WBC 8.12 9.06 8.51   HGB 10.4* 10.4* 10.2*   HCT 35.9* 36.3* 35.5*    351* 332     Recent Labs   Lab 10/17/20  0351 10/18/20  0358 10/19/20  0252    139 140   K 3.6 3.1* 3.2*   CL 95 95 97   CO2 32* 36* 36*   BUN 7* 10 8   CREATININE 0.7 0.7 0.8   GLU 74 88 134*   CALCIUM 9.7 9.8 9.7     Recent Labs   Lab 10/15/20  0920  10/17/20  0351 10/18/20  0358 10/19/20  0252   ALKPHOS  --    < > 97 95 99   ALT  --    < > 7* 6* 5*   AST  --    < > 13 8* 9*   ALBUMIN  --    < > 2.1* 2.1* 2.2*   PROT  --    < > 7.1 6.7 6.7   BILITOT  --    < > 0.2 0.2 0.1   INR 1.1  --   --   --   --     < > = values in this interval not displayed.        Recent Labs   Lab 10/14/20  1148 10/14/20  1634   POCTGLUCOSE 99 104       Scheduled Meds:   aspirin  81 mg Oral Daily    bisacodyL  5 mg Oral Daily    clopidogreL  75 mg Oral Daily    donepeziL  10 mg Oral QHS    DULoxetine  60 mg Oral Daily    enoxaparin  40 mg Subcutaneous Q12H    isosorbide mononitrate  60 mg Oral Daily    lisinopriL  2.5 mg Oral Daily    metoprolol succinate  50 mg Oral Daily    miconazole nitrate 2%   Topical (Top) BID    pantoprazole  40 mg Oral Before breakfast    pravastatin  40 mg Oral QHS    sodium chloride 0.9%  10 mL Intravenous Q6H    vancomycin (VANCOCIN) IVPB  1,750 mg Intravenous Q24H     Continuous Infusions:  As Needed:  albuterol-ipratropium, bisacodyL, glucose, glucose, hydrALAZINE, HYDROcodone-acetaminophen, HYDROmorphone, melatonin, ondansetron, prochlorperazine, sodium chloride 0.9%, Flushing PICC Protocol **AND** sodium chloride 0.9% **AND** sodium chloride 0.9%, Pharmacy to dose Vancomycin consult **AND** vancomycin - pharmacy to dose    ** update problem list    Active Hospital Problems    Diagnosis  POA    *Discitis [M46.40]  Yes    Back pain [M54.9]  Yes    Bacteremia due to Staphylococcus aureus [R78.81, B95.61]  No    Hydronephrosis [N13.30]  Yes    HTN  (hypertension) [I10]  Unknown    CAD (coronary artery disease) [I25.10]  Unknown    MRSA bacteremia [R78.81, B95.62]  Yes    Dementia without behavioral disturbance [F03.90]  Yes      Resolved Hospital Problems   No resolved problems to display.       Assessment and Plan  / Problems managed today    MRSA Bacteremia  Discitis  -Pt. With recent hx of MRSA bacteremia presents with back pain. MRI shows  Evidence of discitis/osteomyelitis at T9-T10 with possible small associated abscesses within the adjacent soft tissues  -Neurosurgery consulted regarding need for intervention  -Continue broad spectrum abx with vanc/zosyn therapy for now, but strong suspicion for MRSA as cause.  -Blood cultures ordered as they were not drawn at OSH prior to transfer (note that abx have already been given)  -Consult ID for further recommendations regarding need for zosyn  -Remains on vancomycin and ceftriaxone.   -s/p IR abscess drainage on 10/16, micro labs pending  - Final recs from ID: Recommend Vancomycin 1750 mg IV q 24 hours x 8 weeks     End date of IV antibiotics: 12/3/20     Weekly outpatient laboratory on Monday or Tuesday while on IV antibiotics.   · CBC  · CMP  · ESR and CRP  · Vancomycin trough. Target 15-20     HTN  -Continue home BP meds     Dementia without behavioral disturbance  -Continue donepezil PRN  -Delirium precautions     CAD  -Pt. With recent NSTEMI during admission last month. Was deemed Poor cath lab candidate - dementia/DNR  -Continue GDMT with ASA, b-blocker, ACEi, and statin. Will hold plavix for now as pt. Does not have an absolute indication until neurosurgery evaluates need for intervention  -resumed plavix     DVT PPx: Lovenox        Goals of Care:  Return to prior functional status    Discharge plan:    Time (minutes) spent in care of the patient (Greater than 1/2 spent in direct face-to-face contact)  35 minutes    Shama Coronado MD

## 2020-10-19 NOTE — PLAN OF CARE
Problem: Adult Inpatient Plan of Care  Goal: Plan of Care Review  Outcome: Ongoing, Progressing  Goal: Patient-Specific Goal (Individualization)  Outcome: Ongoing, Progressing  Goal: Absence of Hospital-Acquired Illness or Injury  Outcome: Ongoing, Progressing  Intervention: Identify and Manage Fall Risk  Flowsheets (Taken 10/19/2020 0608)  Safety Promotion/Fall Prevention:   assistive device/personal item within reach   Fall Risk signage in place   Fall Risk reviewed with patient/family   side rails raised x 3   instructed to call staff for mobility   lighting adjusted   medications reviewed  Intervention: Prevent VTE (venous thromboembolism)  Flowsheets (Taken 10/19/2020 0608)  VTE Prevention/Management: remove, assess skin and reapply sequential compression device  Goal: Optimal Comfort and Wellbeing  Outcome: Ongoing, Progressing  Intervention: Provide Person-Centered Care  Flowsheets (Taken 10/19/2020 0608)  Trust Relationship/Rapport:   care explained   choices provided   emotional support provided   questions answered   empathic listening provided   questions encouraged   reassurance provided   thoughts/feelings acknowledged     Problem: Skin Injury Risk Increased  Goal: Skin Health and Integrity  Outcome: Ongoing, Progressing  Intervention: Optimize Skin Protection  Flowsheets (Taken 10/19/2020 0608)  Head of Bed (HOB): HOB lowered   VSS through overnight shift.  Patient resting comfortably during shift as long as regularly medicated with pain medication as ordered and requested.

## 2020-10-19 NOTE — CONSULTS
Double lumen PICC placed in right brachial vein of JORDYN, 39cm in length with 0cm exposed and 44cm arm circumference. Lot#[id number].    Line placed by: ROS Cheng RN for 6-8 weeks of IV antibiotics

## 2020-10-19 NOTE — PROGRESS NOTES
Hospital Medicine  Progress note    Team: [facility] [place] [facility] MED A Shama Coronado MD  Admit Date: 10/8/2020  BRIT 10/20/2020  Length of Stay:  LOS: 10 days   Code status: Full Code    Principal Problem:  Discitis    HPI / Hospital Course     Interval hx: Micro from IR drainage in process, remains on IV abx. BP better controlled, patient having bowel movements.     10/16 Underwent IR drainage today, significantly hypertensive after. PRN hydralazine ordered.     10/15 Unable to do IR drainage of abscesses today, will plan again for tomorrow. NPO at midnight. Remains on vancomycin and ceftriaxone.     10/14 Biopsy planned for Thursday.    10/13 Zosyn stopped, rocephin started. Neurosurgery consult,II spoke to them, final recs pending,. WBC -8.2, afebrile, BC- NGTD.  MRI and CT scan of back completed, some nerve root compression noted, await NSG input.    10/09 await ID consult. VSS, WBC is 13.2. BC-NGTD    ROS     Respiratory: neg for cough neg for shortness of breath  Cardiovascular: neg for chest pain neg for palpitations  Gastrointestinal: neg for nausea neg for vomiting, neg for abdominal pain neg for diarrhea neg for constipation   Behavioral/Psych: neg for depression neg for anxiety    PEx  Temp:  [96.5 °F (35.8 °C)-98.6 °F (37 °C)]   Pulse:  [62-69]   Resp:  [15-21]   BP: (137-157)/(64-76)   SpO2:  [89 %-98 %]     Intake/Output Summary (Last 24 hours) at 10/18/2020 2339  Last data filed at 10/18/2020 1628  Gross per 24 hour   Intake 1220 ml   Output 1000 ml   Net 220 ml       General Appearance: no acute distress   Heart: regular rate and rhythm  Respiratory: Normal respiratory effort, no crackles   Abdomen: Soft, non-tender; bowel sounds active  Skin: intact.Skin intact  Neurologic:  No focal numbness or weakness  Mental status: Alert, oriented x 4, affect appropriate     Recent Labs   Lab 10/16/20  0520 10/17/20  0351 10/18/20  0358   WBC 7.81 8.12 9.06   HGB 10.6* 10.4* 10.4*   HCT 36.7* 35.9* 36.3*   * 344  351*     Recent Labs   Lab 10/16/20  0520 10/17/20  0351 10/18/20  0358    137 139   K 3.9 3.6 3.1*   CL 95 95 95   CO2 34* 32* 36*   BUN 6* 7* 10   CREATININE 0.7 0.7 0.7   GLU 87 74 88   CALCIUM 10.0 9.7 9.8     Recent Labs   Lab 10/15/20  0920 10/16/20  0520 10/17/20  0351 10/18/20  0358   ALKPHOS  --  88 97 95   ALT  --  6* 7* 6*   AST  --  14 13 8*   ALBUMIN  --  2.1* 2.1* 2.1*   PROT  --  7.2 7.1 6.7   BILITOT  --  0.2 0.2 0.2   INR 1.1  --   --   --         Recent Labs   Lab 10/14/20  1148 10/14/20  1634   POCTGLUCOSE 99 104       Scheduled Meds:   aspirin  81 mg Oral Daily    bisacodyL  5 mg Oral Daily    cefTRIAXone (ROCEPHIN) IVPB  2 g Intravenous Q24H    clopidogreL  75 mg Oral Daily    donepeziL  10 mg Oral QHS    DULoxetine  60 mg Oral Daily    enoxaparin  40 mg Subcutaneous Q12H    isosorbide mononitrate  60 mg Oral Daily    lisinopriL  2.5 mg Oral Daily    metoprolol succinate  50 mg Oral Daily    miconazole nitrate 2%   Topical (Top) BID    pantoprazole  40 mg Oral Before breakfast    pravastatin  40 mg Oral QHS    vancomycin (VANCOCIN) IVPB  1,750 mg Intravenous Q24H     Continuous Infusions:  As Needed:  albuterol-ipratropium, bisacodyL, glucose, glucose, hydrALAZINE, HYDROcodone-acetaminophen, HYDROmorphone, melatonin, ondansetron, prochlorperazine, sodium chloride 0.9%, Pharmacy to dose Vancomycin consult **AND** vancomycin - pharmacy to dose    ** update problem list    Active Hospital Problems    Diagnosis  POA    *Discitis [M46.40]  Yes    Back pain [M54.9]  Yes    Bacteremia due to Staphylococcus aureus [R78.81, B95.61]  No    Hydronephrosis [N13.30]  Yes    HTN (hypertension) [I10]  Unknown    CAD (coronary artery disease) [I25.10]  Unknown    MRSA bacteremia [R78.81, B95.62]  Yes    Dementia without behavioral disturbance [F03.90]  Yes      Resolved Hospital Problems   No resolved problems to display.       Assessment and Plan  / Problems managed today    MRSA  Bacteremia  Discitis  -Pt. With recent hx of MRSA bacteremia presents with back pain. MRI shows  Evidence of discitis/osteomyelitis at T9-T10 with possible small associated abscesses within the adjacent soft tissues  -Neurosurgery consulted regarding need for intervention  -Continue broad spectrum abx with vanc/zosyn therapy for now, but strong suspicion for MRSA as cause.  -Blood cultures ordered as they were not drawn at OSH prior to transfer (note that abx have already been given)  -Consult ID for further recommendations regarding need for zosyn  -Remains on vancomycin and ceftriaxone.   -s/p IR abscess drainage on 10/16, micro labs pending     HTN  -Continue home BP meds     Dementia without behavioral disturbance  -Continue donepezil PRN  -Delirium precautions     CAD  -Pt. With recent NSTEMI during admission last month. Was deemed Poor cath lab candidate - dementia/DNR  -Continue GDMT with ASA, b-blocker, ACEi, and statin. Will hold plavix for now as pt. Does not have an absolute indication until neurosurgery evaluates need for intervention     DVT PPx: Lovenox        Goals of Care:  Return to prior functional status    Discharge plan:    Time (minutes) spent in care of the patient (Greater than 1/2 spent in direct face-to-face contact)  35 minutes    Shama Coronado MD

## 2020-10-19 NOTE — PROGRESS NOTES
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 Spine infection likely hematogenous from under treated bacteremia. Neurosurgery has been consulted - unfortunately no plans for surgical intervention at this time. She has been on Vancomycin and Ceftriaxone.  Underwent T9-10 disc space biopsy with IR on 10/16. Cultures negative, though had been on IV antibiotics multiple days prior.  Afebrile. HDS. Repeat blood cx sterile.      Plan  Recommend Vancomycin 1750 mg IV q 24 hours x 8 weeks    End date of IV antibiotics: 12/3/20    Weekly outpatient laboratory on Monday or Tuesday while on IV antibiotics.    CBC   CMP   ESR and CRP   Vancomycin trough. Target 15-20    If vancomycin trough is not at target (15-20) prior to discharge, the please perform vancomycin trough before their fourth outpatient dose.    Fax laboratory results to [facility] at [phone] with attn: Krystina Mendoza    Outpatient Infectious Diseases clinic follow up will be arranged and found in patient calendar.    Please arrange repeat MRI in 4 weeks to reassess status of her fluid collections as no surgical drainage undertaken    Prior to discharge, please ensure the patient's follow-up has been scheduled.  If there is still no follow-up scheduled in Infectious Diseases clinic, please send an EPIC message to Yvrose Mcgill in Infectious Diseases.    Discussed plan  with primary team. ID will sign off.            Please call for any questions. Thank you.  Krystina Mendoza PA-C  Phone: [phone]  Pager: [phone]      Subjective:     Principal Problem:Discitis    HPI: Patient is a 70-year-old female with a history of recent hospitalization x2 including a Staph aureus bacteremia and then a subsequent pneumonia who at the last hospitalization about 1-2 weeks ago made a significant improvement with antibiotics return home with her daughter.  The patient reports she was doing well until about 24 hours ago began having excruciating flank pain.  In the ED she had imaging studies that showed a diskitis at the bottom thoracic spine levels and the concern of a possible epidural abscess/vertebral infection was entertained.  The case was discussed with an outside facility that had neurosurgical services and they felt that an MRI was needed before transfer.  She was transferred from [facility].  The patient has some dementia and during her last few hospitalizations was placed do not resuscitate because of her deteriorating condition.     Per dc summary from last hospitalization patient was treated from 9/23 to 10/1 with zyvox then dc'd.  Blood cultures cleared on 9/26.  And repeats 10/8 are NGTD.    MRI T spine obtained showing:  There is fluid signal within the T9-T10 disc space along with destructive changes of the adjacent vertebral body endplates and associated marrow edema, suggestive of discitis and osteomyelitis.  Small ovoid areas of fluid signal within the soft tissues along the lateral aspects of the T9-T10 disc space bilaterally could reflect abscesses.  The collection on the left measures 2.7 x 1.5 cm and the collection on the right measures 3.8 x 2.4 cm.  Mild degenerative related signal change is seen along the endplates of multiple other thoracic bodies.    CT renal stone study shows:  Destructive changes at the endplates of the T9 and T10 bodies, new when compared to the chest CT  from 08/09/2020 and suspicious for discitis.  Mild-to-moderate right hydronephrosis along with multiple calcifications in the vicinity of the distal right ureter, most of which likely reflect calcified phleboliths.  One of these calcifications could represent a ureteral stone.  Small bilateral pleural effusions with adjacent atelectasis/infiltrates.    Patient afebrile and WBC 13s.  NSGY consulted. On Vanc and Zosyn.  ID consulted for abx recs.  Patient co of sever back pain.  She denies dysuria, fever, chills and sweats.  Interval History: NAEON. Afebrile. WBC WNL. Back pain somewhat improved. Still very weak.   The patient denies any recent fever, chills, or sweats. Planning to be discharged to SNF. Called and updated patient's daughter.      Review of Systems   Constitutional: Negative for chills, diaphoresis and fever.   Respiratory: Positive for shortness of breath. Negative for cough.    Cardiovascular: Negative for chest pain and palpitations.   Gastrointestinal: Negative for constipation and nausea.   Genitourinary: Positive for flank pain. Negative for difficulty urinating and dysuria.   Musculoskeletal: Positive for back pain. Negative for arthralgias and neck pain.   Skin: Negative for rash and wound.   Neurological: Positive for weakness. Negative for numbness.   Psychiatric/Behavioral: Negative for agitation and confusion. The patient is not nervous/anxious.      Objective:     Vital Signs (Most Recent):  Temp: 98.2 °F (36.8 °C) (10/16/20 0936)  Pulse: 64 (10/16/20 0936)  Resp: 15 (10/16/20 1009)  BP: (!) 171/78 (10/16/20 0936)  SpO2: (!) 94 % (10/16/20 0936) Vital Signs (24h Range):  Temp:  [96.1 °F (35.6 °C)-98.2 °F (36.8 °C)] 98.2 °F (36.8 °C)  Pulse:  [60-65] 64  Resp:  [15-20] 15  SpO2:  [85 %-97 %] 94 %  BP: (138-181)/(65-78) 171/78     Weight: (!) 137 kg (302 lb)  Body mass index is 43.33 kg/m².    Estimated Creatinine Clearance: 110 mL/min (based on SCr of 0.7 mg/dL).    Physical  Exam  Constitutional:       General: She is not in acute distress.     Appearance: She is well-developed. She is obese. She is not diaphoretic.   HENT:      Head: Normocephalic and atraumatic.   Eyes:      General: No scleral icterus.     Conjunctiva/sclera: Conjunctivae normal.   Cardiovascular:      Rate and Rhythm: Normal rate and regular rhythm.   Pulmonary:      Effort: Pulmonary effort is normal. No respiratory distress.   Abdominal:      General: There is no distension.      Palpations: Abdomen is soft.      Tenderness: There is no abdominal tenderness. There is no guarding.   Musculoskeletal:         General: Tenderness present.   Skin:     General: Skin is warm and dry.      Findings: No rash.   Neurological:      Mental Status: She is alert and oriented to person, place, and time.      Motor: Weakness present.   Psychiatric:         Mood and Affect: Mood normal.         Behavior: Behavior normal.         Thought Content: Thought content normal.         Significant Labs: All pertinent labs within the past 24 hours have been reviewed.    Significant Imaging: I have reviewed all pertinent imaging results/findings within the past 24 hours.   CT Thoracic Spine Without Contrast [[id number]] (Abnormal) Resulted: 10/13/20 0854   Order Status: Completed Updated: 10/13/20 0856   Narrative:     EXAMINATION:   CT THORACIC SPINE WITHOUT CONTRAST; CT LUMBAR SPINE WITHOUT CONTRAST     CLINICAL HISTORY:   eval discitis;     TECHNIQUE:   Low dose axial images, sagittal and coronal reformations were performed though the thoracic and lumbar spine.  Contrast was not administered.     COMPARISON:   MRI thoracic and lumbar spine 10/12/2020     FINDINGS:   Thoracic spinal alignment within normal limits.  Destructive changes centered about the T9-T10 intervertebral disc and adjacent endplates concerning for T9-T10 discitis/osteomyelitis.  Resulting moderate vertebral body height loss at T9 and T10 identified.  Increased soft  tissue density about the affected vertebral bodies keeping with known multilocular paravertebral abscesses, better evaluated on MRI 10/12/2020.  Osseous fragments within the disc space abut and narrow the ventral aspect of the thecal sac and result in severe bilateral foraminal narrowing.     Vertebral body heights at the unaffected levels are maintained.  Mild multilevel intervertebral disc height loss.  Mild thoracic spondylosis identified, most notable in the upper thoracic spine resulting in mild left, moderate right neural foraminal narrowing at T2-T3, moderate right neural foraminal narrowing T3-T4, and mild right neural foraminal narrowing at T4-T5.  Multilevel circumferential disc bulge is identified.     Bilateral L5 pars defects with grade 2 anterolisthesis of L5 on S1.  Grade 1 anterolisthesis of L3 on L4.  No acute fracture.  Vertebral body heights are maintained.  There is multilevel intervertebral disc height loss most notable at L4-L5 and L5-S1.  Prominent multilevel anterior marginal osteophytosis.     T12-L1: No significant disc bulge, neural foraminal narrowing, or spinal canal stenosis     L1-L2: Circumferential disc bulge, ligamentum flavum thickening, and facet hypertrophy resulting in mild bilateral neural foraminal narrowing, right greater than left.  No spinal canal stenosis.     L2-L3: Circumferential disc bulge, advanced facet hypertrophy and ligamentum flavum thickening resulting in moderate spinal canal stenosis and mild right neural foraminal narrowing.     L3-L4: Circumferential disc bulge, advanced facet hypertrophy, and ligamentum flavum thickening resulting in severe spinal canal stenosis and mild right neural foraminal narrowing.     L4-L5: Circumferential disc bulge, advanced facet hypertrophy, and ligamentum flavum thickening resulting in severe spinal canal stenosis and mild right neural foraminal narrowing.     L5-S1: Uncovering of the intervertebral disc and advanced facet  hypertrophy resulting in severe bilateral neural foraminal narrowing.  No spinal canal stenosis.     Limited evaluation of the intrathoracic structures reveals aortic annular calcification and coronary artery atherosclerosis.  Bilateral pleural effusions identified, larger on the left with compressive atelectasis/volume loss of adjacent lung parenchyma.  Moderate-large hiatal hernia.  Stable nodular thickening of the right adrenal gland, unchanged compared to prior studies.     Limited evaluation of abdominopelvic structures reveals operative change of cholecystectomy, severe aortic atherosclerosis into the branch vessels, mild paraspinal muscular atrophy, and advanced sigmoid diverticulosis.    Impression:       Findings above consistent with T9-T10 discitis/osteomyelitis with multilocular paravertebral abscesses, better evaluated on MRI 10/12/2020.  No significant retropulsion osseous fragments into the canal.     Multilevel lumbar spondylosis resulting in moderate to severe spinal canal stenosis from L2-L3 through L4-L5 and severe bilateral neural foraminal narrowing at L5-S1.     Mild multilevel thoracic spondylosis, detailed above.     Bilateral pleural effusions, larger on the left with compressive atelectasis/volume loss of adjacent lung parenchyma.     Moderate-large size hiatal hernia     Multi-vessel coronary artery atherosclerosis.     Additional findings detailed above.     This report was flagged in Epic as abnormal.     Electronically signed by resident: Oscar Castro MD   Date: 10/13/2020   Time: 07:49     Electronically signed by: Israel Diaz MD   Date: 10/13/2020   Time: 08:54   CT Lumbar Spine Without Contrast [[id number]] (Abnormal) Resulted: 10/13/20 0854   Order Status: Completed Updated: 10/13/20 0856   Narrative:     EXAMINATION:   CT THORACIC SPINE WITHOUT CONTRAST; CT LUMBAR SPINE WITHOUT CONTRAST     CLINICAL HISTORY:   eval discitis;     TECHNIQUE:   Low dose axial images, sagittal and  coronal reformations were performed though the thoracic and lumbar spine.  Contrast was not administered.     COMPARISON:   MRI thoracic and lumbar spine 10/12/2020     FINDINGS:   Thoracic spinal alignment within normal limits.  Destructive changes centered about the T9-T10 intervertebral disc and adjacent endplates concerning for T9-T10 discitis/osteomyelitis.  Resulting moderate vertebral body height loss at T9 and T10 identified.  Increased soft tissue density about the affected vertebral bodies keeping with known multilocular paravertebral abscesses, better evaluated on MRI 10/12/2020.  Osseous fragments within the disc space abut and narrow the ventral aspect of the thecal sac and result in severe bilateral foraminal narrowing.     Vertebral body heights at the unaffected levels are maintained.  Mild multilevel intervertebral disc height loss.  Mild thoracic spondylosis identified, most notable in the upper thoracic spine resulting in mild left, moderate right neural foraminal narrowing at T2-T3, moderate right neural foraminal narrowing T3-T4, and mild right neural foraminal narrowing at T4-T5.  Multilevel circumferential disc bulge is identified.     Bilateral L5 pars defects with grade 2 anterolisthesis of L5 on S1.  Grade 1 anterolisthesis of L3 on L4.  No acute fracture.  Vertebral body heights are maintained.  There is multilevel intervertebral disc height loss most notable at L4-L5 and L5-S1.  Prominent multilevel anterior marginal osteophytosis.     T12-L1: No significant disc bulge, neural foraminal narrowing, or spinal canal stenosis     L1-L2: Circumferential disc bulge, ligamentum flavum thickening, and facet hypertrophy resulting in mild bilateral neural foraminal narrowing, right greater than left.  No spinal canal stenosis.     L2-L3: Circumferential disc bulge, advanced facet hypertrophy and ligamentum flavum thickening resulting in moderate spinal canal stenosis and mild right neural foraminal  narrowing.     L3-L4: Circumferential disc bulge, advanced facet hypertrophy, and ligamentum flavum thickening resulting in severe spinal canal stenosis and mild right neural foraminal narrowing.     L4-L5: Circumferential disc bulge, advanced facet hypertrophy, and ligamentum flavum thickening resulting in severe spinal canal stenosis and mild right neural foraminal narrowing.     L5-S1: Uncovering of the intervertebral disc and advanced facet hypertrophy resulting in severe bilateral neural foraminal narrowing.  No spinal canal stenosis.     Limited evaluation of the intrathoracic structures reveals aortic annular calcification and coronary artery atherosclerosis.  Bilateral pleural effusions identified, larger on the left with compressive atelectasis/volume loss of adjacent lung parenchyma.  Moderate-large hiatal hernia.  Stable nodular thickening of the right adrenal gland, unchanged compared to prior studies.     Limited evaluation of abdominopelvic structures reveals operative change of cholecystectomy, severe aortic atherosclerosis into the branch vessels, mild paraspinal muscular atrophy, and advanced sigmoid diverticulosis.    Impression:       Findings above consistent with T9-T10 discitis/osteomyelitis with multilocular paravertebral abscesses, better evaluated on MRI 10/12/2020.  No significant retropulsion osseous fragments into the canal.     Multilevel lumbar spondylosis resulting in moderate to severe spinal canal stenosis from L2-L3 through L4-L5 and severe bilateral neural foraminal narrowing at L5-S1.     Mild multilevel thoracic spondylosis, detailed above.     Bilateral pleural effusions, larger on the left with compressive atelectasis/volume loss of adjacent lung parenchyma.     Moderate-large size hiatal hernia     Multi-vessel coronary artery atherosclerosis.     Additional findings detailed above.     This report was flagged in Epic as abnormal.     Electronically signed by resident:  Oscar Castro MD   Date: 10/13/2020   Time: 07:49     Electronically signed by: Israel Diaz MD   Date: 10/13/2020   Time: 08:54   MRI Thoracic Spine W WO Cont [[id number]] (Abnormal) Resulted: 10/12/20 1620   Order Status: Completed Updated: 10/12/20 1623   Narrative:     EXAMINATION:   MRI THORACIC SPINE W WO CONTRAST     CLINICAL HISTORY:   eval discitis;     TECHNIQUE:   Multiplanar, multisequence MR images were acquired from the cervicothoracic junction to the thoracolumbar junction with and without Gadavist contrast.     COMPARISON:   MRI thoracic spine without contrast 10/08/2020. CTA chest 08/09/2020.     FINDINGS:   Alignment: Normal.     Vertebrae and discs: Fluid signal within the T9-T10 disc space with destructive changes and enhancement of the adjacent vertebral body endplates along with marrow replacement, compatible with discitis and osteomyelitis. There is extension into the posterior spinal elements. There is mild T9 and T10 vertebral body height loss.  Redemonstration of multilocular paravertebral abscesses adjacent to the T9-T10 disc space.  The largest collection on the right measures 3.3 x 1.6 cm.  The collection on the left measures 1.6 x 0.8 cm.     Cord: There is thickening of the dura at the T8-T10 level with enhancement compatible with epidural phlegmon, resulting in moderate narrowing of the thecal sac.  There is associated nerve root enhancement, which may be seen with arachnoiditis.  No definite spinal cord edema.     Degenerative findings:     Mild degenerative changes and disc bulges in the upper thoracic spine, most prominent at T5-T6, where circumferential disc bulge results in mild compression of the thecal sac.  There is mild bilateral neural foraminal narrowing at T2-T3.     Paraspinal muscles & soft tissues: Bilateral lower lung zone airspace consolidation and complex pleural effusions with enhancement.  Note made of hiatal hernia.    Impression:       Discitis/osteomyelitis at  the T9-T10 level with multilocular paravertebral abscesses similar to prior exam.     T8-T10 epidural phlegmon results in moderate narrowing of the thecal sac. There is associated nerve root enhancement, which may be seen with arachnoiditis.     Bilateral complex pleural effusions with enhancement.  Empyema is a consideration.  Consider further evaluation with contrast enhanced chest CT.     Multilevel mild degenerative changes and disc bulges in the thoracic spine, most prominent at T5-T6.     Additional findings as above.     This report was flagged in Epic as abnormal.     Electronically signed by resident: Efrem Urena   Date: 10/12/2020   Time: 14:05     Electronically signed by: Tiago uLna MD   Date: 10/12/2020   Time: 16:20   MRI Lumbar Spine W WO Cont [[id number]] Resulted: 10/12/20 1330   Order Status: Completed Updated: 10/12/20 1333   Narrative:     EXAMINATION:   MRI LUMBAR SPINE W WO CONTRAST     CLINICAL HISTORY:   eval discitis;     TECHNIQUE:   Multiplanar, multisequence MR images were acquired from the thoracolumbar junction to the sacrum without and with 10 mL Gadavist intravenous contrast.     COMPARISON:   01/25/2016     FINDINGS:   Alignment: Grade 2 anterolisthesis at L5-S1 secondary to bilateral L5 spondylolysis.  Grade 1 anterolisthesis noted at L3-L4.     Vertebrae: There are multilevel degenerative endplate changes.  No fracture or infiltrative process.     Discs: There is moderate to severe disc height loss throughout the lower lumbar spine.  Mild fluid signal noted at L3-S1, likely degenerative.     Cord: Normal.  Conus terminates at L1.     Enhancement: Mild enhancement noted about the arthritic L5-S1 facet joints, likely reactive.  No nerve root enhancement.     Degenerative findings:     T12-L1: No spinal canal stenosis or neural foraminal narrowing.     L1-L2: Mild facet arthropathy resulting in mild bilateral neural foraminal narrowing.     L2-L3: Circumferential disc bulge  and moderate facet arthropathy result in moderate spinal canal stenosis and mild right neural foraminal narrowing.     L3-L4: Circumferential disc bulge and severe facet arthropathy result in severe spinal canal stenosis and mild right neural foraminal narrowing.     L4-L5: Circumferential disc bulge and moderate facet arthropathy result in moderate spinal canal stenosis and mild bilateral neural foraminal narrowing.     L5-S1: Malalignment with moderate facet arthropathy result in severe bilateral neural foraminal narrowing.     Paraspinal muscles & soft tissues: Moderate paraspinal muscle atrophy.    Impression:       No evidence for spondylodiscitis.     Bilateral L5 spondylolysis with grade 2 anterolisthesis.     Multilevel degenerative changes of the lumbar spine detailed above.  Moderate to severe spinal canal stenosis noted at L2-L5.  Severe neural foraminal narrowing noted at L5-S1.       Electronically signed by: Tiago Luna MD   Date: 10/12/2020   Time: 13:30   Imaging History    2020  Date Procedure Name Status Accession Number Location   10/13/20 01:04 AM CT Thoracic Spine Without Contrast Final [id number] [facility]   10/13/20 01:04 AM CT Lumbar Spine Without Contrast Final [id number] [facility]   10/12/20 01:21 PM MRI Thoracic Spine W WO Cont Final [id number] [facility]   10/12/20 01:20 PM MRI Lumbar Spine W WO Cont Final [id number] [facility]   10/08/20 12:37 PM MRI Thoracic Spine Without Contrast Final [id number] [facility]   10/07/20 02:35 PM CT Renal Stone Study ABD Pelvis WO Final [id number] [facility]   10/07/20 02:35 PM X-Ray Chest AP Portable Final [id number] [facility]   09/23/20 01:18 PM X-Ray Chest 1 View Final [id number] [facility]   09/23/20 12:00 AM CARDIAC MONITORING STRIPS Final     10/12/20 09:30 AM Echo Color Flow Doppler? Yes Final

## 2020-10-20 ENCOUNTER — HOSPITAL ENCOUNTER (INPATIENT)
Facility: HOSPITAL | Age: 70
LOS: 42 days | Discharge: ANOTHER HOSPITAL | DRG: 948 | End: 2020-12-01
Attending: INTERNAL MEDICINE | Admitting: INTERNAL MEDICINE
Payer: MEDICARE

## 2020-10-20 VITALS
HEIGHT: 70 IN | DIASTOLIC BLOOD PRESSURE: 77 MMHG | BODY MASS INDEX: 41.95 KG/M2 | WEIGHT: 293 LBS | TEMPERATURE: 99 F | OXYGEN SATURATION: 92 % | SYSTOLIC BLOOD PRESSURE: 165 MMHG | RESPIRATION RATE: 20 BRPM | HEART RATE: 65 BPM

## 2020-10-20 DIAGNOSIS — R53.81 DEBILITY: ICD-10-CM

## 2020-10-20 DIAGNOSIS — M46.40 DISCITIS: ICD-10-CM

## 2020-10-20 LAB
ALBUMIN SERPL BCP-MCNC: 2.3 G/DL (ref 3.5–5.2)
ALP SERPL-CCNC: 102 U/L (ref 55–135)
ALT SERPL W/O P-5'-P-CCNC: 7 U/L (ref 10–44)
ANION GAP SERPL CALC-SCNC: 11 MMOL/L (ref 8–16)
AST SERPL-CCNC: 8 U/L (ref 10–40)
BASOPHILS # BLD AUTO: 0.04 K/UL (ref 0–0.2)
BASOPHILS NFR BLD: 0.4 % (ref 0–1.9)
BILIRUB SERPL-MCNC: 0.3 MG/DL (ref 0.1–1)
BUN SERPL-MCNC: 8 MG/DL (ref 8–23)
CALCIUM SERPL-MCNC: 10 MG/DL (ref 8.7–10.5)
CHLORIDE SERPL-SCNC: 97 MMOL/L (ref 95–110)
CO2 SERPL-SCNC: 34 MMOL/L (ref 23–29)
CREAT SERPL-MCNC: 0.8 MG/DL (ref 0.5–1.4)
DIFFERENTIAL METHOD: ABNORMAL
EOSINOPHIL # BLD AUTO: 0.2 K/UL (ref 0–0.5)
EOSINOPHIL NFR BLD: 2.1 % (ref 0–8)
ERYTHROCYTE [DISTWIDTH] IN BLOOD BY AUTOMATED COUNT: 15.7 % (ref 11.5–14.5)
EST. GFR  (AFRICAN AMERICAN): >60 ML/MIN/1.73 M^2
EST. GFR  (NON AFRICAN AMERICAN): >60 ML/MIN/1.73 M^2
GLUCOSE SERPL-MCNC: 80 MG/DL (ref 70–110)
HCT VFR BLD AUTO: 38.8 % (ref 37–48.5)
HGB BLD-MCNC: 10.9 G/DL (ref 12–16)
IMM GRANULOCYTES # BLD AUTO: 0.03 K/UL (ref 0–0.04)
IMM GRANULOCYTES NFR BLD AUTO: 0.3 % (ref 0–0.5)
LYMPHOCYTES # BLD AUTO: 2.3 K/UL (ref 1–4.8)
LYMPHOCYTES NFR BLD: 23.3 % (ref 18–48)
MCH RBC QN AUTO: 26.8 PG (ref 27–31)
MCHC RBC AUTO-ENTMCNC: 28.1 G/DL (ref 32–36)
MCV RBC AUTO: 95 FL (ref 82–98)
MONOCYTES # BLD AUTO: 1.3 K/UL (ref 0.3–1)
MONOCYTES NFR BLD: 13 % (ref 4–15)
NEUTROPHILS # BLD AUTO: 5.9 K/UL (ref 1.8–7.7)
NEUTROPHILS NFR BLD: 60.9 % (ref 38–73)
NRBC BLD-RTO: 0 /100 WBC
PLATELET # BLD AUTO: 343 K/UL (ref 150–350)
PMV BLD AUTO: 10.3 FL (ref 9.2–12.9)
POTASSIUM SERPL-SCNC: 3.9 MMOL/L (ref 3.5–5.1)
PROT SERPL-MCNC: 7.1 G/DL (ref 6–8.4)
RBC # BLD AUTO: 4.07 M/UL (ref 4–5.4)
SODIUM SERPL-SCNC: 142 MMOL/L (ref 136–145)
WBC # BLD AUTO: 9.74 K/UL (ref 3.9–12.7)

## 2020-10-20 PROCEDURE — 63600175 PHARM REV CODE 636 W HCPCS: Performed by: INTERNAL MEDICINE

## 2020-10-20 PROCEDURE — 99239 PR HOSPITAL DISCHARGE DAY,>30 MIN: ICD-10-PCS | Mod: 95,,, | Performed by: INTERNAL MEDICINE

## 2020-10-20 PROCEDURE — 97530 THERAPEUTIC ACTIVITIES: CPT

## 2020-10-20 PROCEDURE — 25000003 PHARM REV CODE 250: Performed by: STUDENT IN AN ORGANIZED HEALTH CARE EDUCATION/TRAINING PROGRAM

## 2020-10-20 PROCEDURE — 11000004 HC SNF PRIVATE

## 2020-10-20 PROCEDURE — 25000003 PHARM REV CODE 250: Performed by: INTERNAL MEDICINE

## 2020-10-20 PROCEDURE — A4216 STERILE WATER/SALINE, 10 ML: HCPCS | Performed by: STUDENT IN AN ORGANIZED HEALTH CARE EDUCATION/TRAINING PROGRAM

## 2020-10-20 PROCEDURE — 97110 THERAPEUTIC EXERCISES: CPT

## 2020-10-20 PROCEDURE — 27000221 HC OXYGEN, UP TO 24 HOURS

## 2020-10-20 PROCEDURE — 80053 COMPREHEN METABOLIC PANEL: CPT

## 2020-10-20 PROCEDURE — 36415 COLL VENOUS BLD VENIPUNCTURE: CPT

## 2020-10-20 PROCEDURE — 63600175 PHARM REV CODE 636 W HCPCS: Performed by: STUDENT IN AN ORGANIZED HEALTH CARE EDUCATION/TRAINING PROGRAM

## 2020-10-20 PROCEDURE — 94760 N-INVAS EAR/PLS OXIMETRY 1: CPT

## 2020-10-20 PROCEDURE — 99239 HOSP IP/OBS DSCHRG MGMT >30: CPT | Mod: 95,,, | Performed by: INTERNAL MEDICINE

## 2020-10-20 PROCEDURE — 85025 COMPLETE CBC W/AUTO DIFF WBC: CPT

## 2020-10-20 PROCEDURE — 94799 UNLISTED PULMONARY SVC/PX: CPT

## 2020-10-20 PROCEDURE — 25000003 PHARM REV CODE 250: Performed by: HOSPITALIST

## 2020-10-20 RX ORDER — DONEPEZIL HYDROCHLORIDE 10 MG/1
10 TABLET, FILM COATED ORAL NIGHTLY
Status: CANCELLED | OUTPATIENT
Start: 2020-10-20

## 2020-10-20 RX ORDER — BISACODYL 5 MG
5 TABLET, DELAYED RELEASE (ENTERIC COATED) ORAL DAILY PRN
Status: CANCELLED | OUTPATIENT
Start: 2020-10-20

## 2020-10-20 RX ORDER — METOPROLOL SUCCINATE 50 MG/1
50 TABLET, EXTENDED RELEASE ORAL DAILY
Status: DISCONTINUED | OUTPATIENT
Start: 2020-10-21 | End: 2020-12-01 | Stop reason: HOSPADM

## 2020-10-20 RX ORDER — DONEPEZIL HYDROCHLORIDE 5 MG/1
10 TABLET, FILM COATED ORAL NIGHTLY
Status: DISCONTINUED | OUTPATIENT
Start: 2020-10-20 | End: 2020-12-01 | Stop reason: HOSPADM

## 2020-10-20 RX ORDER — SODIUM CHLORIDE 0.9 % (FLUSH) 0.9 %
10 SYRINGE (ML) INJECTION
Status: DISCONTINUED | OUTPATIENT
Start: 2020-10-20 | End: 2020-12-01 | Stop reason: HOSPADM

## 2020-10-20 RX ORDER — FUROSEMIDE 40 MG/1
40 TABLET ORAL DAILY
Status: DISCONTINUED | OUTPATIENT
Start: 2020-10-21 | End: 2020-10-20 | Stop reason: HOSPADM

## 2020-10-20 RX ORDER — BISACODYL 5 MG
5 TABLET, DELAYED RELEASE (ENTERIC COATED) ORAL DAILY PRN
Status: DISCONTINUED | OUTPATIENT
Start: 2020-10-20 | End: 2020-12-01 | Stop reason: HOSPADM

## 2020-10-20 RX ORDER — FERROUS SULFATE 325(65) MG
325 TABLET, DELAYED RELEASE (ENTERIC COATED) ORAL NIGHTLY
Status: DISCONTINUED | OUTPATIENT
Start: 2020-10-20 | End: 2020-10-20 | Stop reason: HOSPADM

## 2020-10-20 RX ORDER — PRAVASTATIN SODIUM 40 MG/1
40 TABLET ORAL NIGHTLY
Status: DISCONTINUED | OUTPATIENT
Start: 2020-10-20 | End: 2020-12-01 | Stop reason: HOSPADM

## 2020-10-20 RX ORDER — FUROSEMIDE 40 MG/1
40 TABLET ORAL DAILY
Status: DISCONTINUED | OUTPATIENT
Start: 2020-10-21 | End: 2020-12-01 | Stop reason: HOSPADM

## 2020-10-20 RX ORDER — PANTOPRAZOLE SODIUM 40 MG/1
40 TABLET, DELAYED RELEASE ORAL
Status: DISCONTINUED | OUTPATIENT
Start: 2020-10-21 | End: 2020-12-01 | Stop reason: HOSPADM

## 2020-10-20 RX ORDER — HYDROCODONE BITARTRATE AND ACETAMINOPHEN 10; 325 MG/1; MG/1
1 TABLET ORAL EVERY 6 HOURS PRN
Status: CANCELLED | OUTPATIENT
Start: 2020-10-20

## 2020-10-20 RX ORDER — SODIUM CHLORIDE 0.9 % (FLUSH) 0.9 %
10 SYRINGE (ML) INJECTION EVERY 6 HOURS
Status: DISCONTINUED | OUTPATIENT
Start: 2020-10-21 | End: 2020-12-01 | Stop reason: HOSPADM

## 2020-10-20 RX ORDER — DULOXETIN HYDROCHLORIDE 30 MG/1
60 CAPSULE, DELAYED RELEASE ORAL DAILY
Status: CANCELLED | OUTPATIENT
Start: 2020-10-21

## 2020-10-20 RX ORDER — VANCOMYCIN HYDROCHLORIDE 1 G/20ML
INJECTION, POWDER, LYOPHILIZED, FOR SOLUTION INTRAVENOUS
Status: COMPLETED
Start: 2020-10-20 | End: 2020-10-21

## 2020-10-20 RX ORDER — IPRATROPIUM BROMIDE AND ALBUTEROL SULFATE 2.5; .5 MG/3ML; MG/3ML
3 SOLUTION RESPIRATORY (INHALATION) EVERY 6 HOURS PRN
Status: CANCELLED | OUTPATIENT
Start: 2020-10-20

## 2020-10-20 RX ORDER — LISINOPRIL 2.5 MG/1
2.5 TABLET ORAL DAILY
Status: DISCONTINUED | OUTPATIENT
Start: 2020-10-21 | End: 2020-10-21

## 2020-10-20 RX ORDER — MEMANTINE HYDROCHLORIDE 28 MG/1
28 CAPSULE, EXTENDED RELEASE ORAL DAILY
Status: DISCONTINUED | OUTPATIENT
Start: 2020-10-21 | End: 2020-10-20

## 2020-10-20 RX ORDER — ONDANSETRON 4 MG/1
4 TABLET, ORALLY DISINTEGRATING ORAL EVERY 8 HOURS PRN
Status: CANCELLED | OUTPATIENT
Start: 2020-10-20

## 2020-10-20 RX ORDER — ACETAMINOPHEN 500 MG
500 TABLET ORAL 3 TIMES DAILY
Status: DISCONTINUED | OUTPATIENT
Start: 2020-10-20 | End: 2020-10-20 | Stop reason: HOSPADM

## 2020-10-20 RX ORDER — FUROSEMIDE 40 MG/1
40 TABLET ORAL DAILY
Status: CANCELLED | OUTPATIENT
Start: 2020-10-21

## 2020-10-20 RX ORDER — METHOCARBAMOL 500 MG/1
500 TABLET, FILM COATED ORAL 4 TIMES DAILY
Status: DISCONTINUED | OUTPATIENT
Start: 2020-10-20 | End: 2020-12-01 | Stop reason: HOSPADM

## 2020-10-20 RX ORDER — PANTOPRAZOLE SODIUM 40 MG/1
40 TABLET, DELAYED RELEASE ORAL
Status: CANCELLED | OUTPATIENT
Start: 2020-10-21

## 2020-10-20 RX ORDER — CLOPIDOGREL BISULFATE 75 MG/1
75 TABLET ORAL DAILY
Status: CANCELLED | OUTPATIENT
Start: 2020-10-21

## 2020-10-20 RX ORDER — MEMANTINE HYDROCHLORIDE 28 MG/1
28 CAPSULE, EXTENDED RELEASE ORAL DAILY
Status: DISCONTINUED | OUTPATIENT
Start: 2020-10-21 | End: 2020-10-21

## 2020-10-20 RX ORDER — LISINOPRIL 2.5 MG/1
2.5 TABLET ORAL DAILY
Status: CANCELLED | OUTPATIENT
Start: 2020-10-21

## 2020-10-20 RX ORDER — METHOCARBAMOL 500 MG/1
500 TABLET, FILM COATED ORAL 4 TIMES DAILY
Status: DISCONTINUED | OUTPATIENT
Start: 2020-10-20 | End: 2020-10-20 | Stop reason: HOSPADM

## 2020-10-20 RX ORDER — ACETAMINOPHEN 325 MG/1
650 TABLET ORAL EVERY 6 HOURS PRN
Status: DISCONTINUED | OUTPATIENT
Start: 2020-10-20 | End: 2020-12-01 | Stop reason: HOSPADM

## 2020-10-20 RX ORDER — ISOSORBIDE MONONITRATE 30 MG/1
60 TABLET, EXTENDED RELEASE ORAL DAILY
Status: CANCELLED | OUTPATIENT
Start: 2020-10-21

## 2020-10-20 RX ORDER — CLOPIDOGREL BISULFATE 75 MG/1
75 TABLET ORAL DAILY
Status: DISCONTINUED | OUTPATIENT
Start: 2020-10-21 | End: 2020-11-06

## 2020-10-20 RX ORDER — ASPIRIN 81 MG/1
81 TABLET ORAL DAILY
Status: DISCONTINUED | OUTPATIENT
Start: 2020-10-21 | End: 2020-11-06

## 2020-10-20 RX ORDER — ONDANSETRON 4 MG/1
4 TABLET, ORALLY DISINTEGRATING ORAL EVERY 8 HOURS PRN
Status: DISCONTINUED | OUTPATIENT
Start: 2020-10-20 | End: 2020-12-01 | Stop reason: HOSPADM

## 2020-10-20 RX ORDER — TALC
6 POWDER (GRAM) TOPICAL NIGHTLY PRN
Status: DISCONTINUED | OUTPATIENT
Start: 2020-10-20 | End: 2020-12-01 | Stop reason: HOSPADM

## 2020-10-20 RX ORDER — MEMANTINE HYDROCHLORIDE 28 MG/1
28 CAPSULE, EXTENDED RELEASE ORAL DAILY
Status: CANCELLED | OUTPATIENT
Start: 2020-10-20

## 2020-10-20 RX ORDER — ACETAMINOPHEN 325 MG/1
650 TABLET ORAL EVERY 6 HOURS PRN
Status: CANCELLED | OUTPATIENT
Start: 2020-10-20

## 2020-10-20 RX ORDER — BISACODYL 5 MG
5 TABLET, DELAYED RELEASE (ENTERIC COATED) ORAL DAILY
Status: CANCELLED | OUTPATIENT
Start: 2020-10-21

## 2020-10-20 RX ORDER — FERROUS SULFATE 325(65) MG
325 TABLET, DELAYED RELEASE (ENTERIC COATED) ORAL NIGHTLY
Status: CANCELLED | OUTPATIENT
Start: 2020-10-20

## 2020-10-20 RX ORDER — ACETAMINOPHEN 500 MG
500 TABLET ORAL 3 TIMES DAILY
Status: CANCELLED | OUTPATIENT
Start: 2020-10-20

## 2020-10-20 RX ORDER — CALCIUM CARBONATE 200(500)MG
500 TABLET,CHEWABLE ORAL 2 TIMES DAILY PRN
Status: CANCELLED | OUTPATIENT
Start: 2020-10-20

## 2020-10-20 RX ORDER — ISOSORBIDE MONONITRATE 60 MG/1
60 TABLET, EXTENDED RELEASE ORAL DAILY
Status: DISCONTINUED | OUTPATIENT
Start: 2020-10-21 | End: 2020-12-01 | Stop reason: HOSPADM

## 2020-10-20 RX ORDER — ASPIRIN 81 MG/1
81 TABLET ORAL DAILY
Status: CANCELLED | OUTPATIENT
Start: 2020-10-21

## 2020-10-20 RX ORDER — BISACODYL 5 MG
5 TABLET, DELAYED RELEASE (ENTERIC COATED) ORAL DAILY
Status: DISCONTINUED | OUTPATIENT
Start: 2020-10-21 | End: 2020-10-26

## 2020-10-20 RX ORDER — VANCOMYCIN 1.75 GRAM/500 ML IN 0.9 % SODIUM CHLORIDE INTRAVENOUS
1750
Status: DISCONTINUED | OUTPATIENT
Start: 2020-10-20 | End: 2020-10-26

## 2020-10-20 RX ORDER — HYDROCODONE BITARTRATE AND ACETAMINOPHEN 10; 325 MG/1; MG/1
1 TABLET ORAL EVERY 6 HOURS PRN
Status: DISCONTINUED | OUTPATIENT
Start: 2020-10-20 | End: 2020-12-01 | Stop reason: HOSPADM

## 2020-10-20 RX ORDER — PRAVASTATIN SODIUM 40 MG/1
40 TABLET ORAL NIGHTLY
Status: CANCELLED | OUTPATIENT
Start: 2020-10-20

## 2020-10-20 RX ORDER — ACETAMINOPHEN 500 MG
500 TABLET ORAL 3 TIMES DAILY
Status: DISCONTINUED | OUTPATIENT
Start: 2020-10-20 | End: 2020-12-01 | Stop reason: HOSPADM

## 2020-10-20 RX ORDER — TALC
6 POWDER (GRAM) TOPICAL NIGHTLY PRN
Status: CANCELLED | OUTPATIENT
Start: 2020-10-20

## 2020-10-20 RX ORDER — CALCIUM CARBONATE 200(500)MG
500 TABLET,CHEWABLE ORAL 2 TIMES DAILY PRN
Status: DISCONTINUED | OUTPATIENT
Start: 2020-10-20 | End: 2020-12-01 | Stop reason: HOSPADM

## 2020-10-20 RX ORDER — SODIUM CHLORIDE 0.9 % (FLUSH) 0.9 %
10 SYRINGE (ML) INJECTION EVERY 6 HOURS
Status: CANCELLED | OUTPATIENT
Start: 2020-10-20

## 2020-10-20 RX ORDER — DULOXETIN HYDROCHLORIDE 30 MG/1
60 CAPSULE, DELAYED RELEASE ORAL DAILY
Status: DISCONTINUED | OUTPATIENT
Start: 2020-10-21 | End: 2020-12-01 | Stop reason: HOSPADM

## 2020-10-20 RX ORDER — METHOCARBAMOL 500 MG/1
500 TABLET, FILM COATED ORAL 4 TIMES DAILY
Status: CANCELLED | OUTPATIENT
Start: 2020-10-20

## 2020-10-20 RX ORDER — SODIUM CHLORIDE 0.9 % (FLUSH) 0.9 %
10 SYRINGE (ML) INJECTION
Status: CANCELLED | OUTPATIENT
Start: 2020-10-20

## 2020-10-20 RX ORDER — IPRATROPIUM BROMIDE AND ALBUTEROL SULFATE 2.5; .5 MG/3ML; MG/3ML
3 SOLUTION RESPIRATORY (INHALATION) EVERY 6 HOURS PRN
Status: DISCONTINUED | OUTPATIENT
Start: 2020-10-20 | End: 2020-10-30

## 2020-10-20 RX ORDER — ENOXAPARIN SODIUM 100 MG/ML
40 INJECTION SUBCUTANEOUS EVERY 12 HOURS
Status: CANCELLED | OUTPATIENT
Start: 2020-10-20

## 2020-10-20 RX ORDER — METOPROLOL SUCCINATE 50 MG/1
50 TABLET, EXTENDED RELEASE ORAL DAILY
Status: CANCELLED | OUTPATIENT
Start: 2020-10-21

## 2020-10-20 RX ORDER — FERROUS SULFATE 325(65) MG
325 TABLET ORAL NIGHTLY
Status: DISCONTINUED | OUTPATIENT
Start: 2020-10-20 | End: 2020-12-01 | Stop reason: HOSPADM

## 2020-10-20 RX ORDER — ENOXAPARIN SODIUM 100 MG/ML
40 INJECTION SUBCUTANEOUS EVERY 12 HOURS
Status: DISCONTINUED | OUTPATIENT
Start: 2020-10-20 | End: 2020-10-28

## 2020-10-20 RX ADMIN — FERROUS SULFATE TAB 325 MG (65 MG ELEMENTAL FE) 325 MG: 325 (65 FE) TAB at 10:10

## 2020-10-20 RX ADMIN — ACETAMINOPHEN 500 MG: 500 TABLET, FILM COATED ORAL at 10:10

## 2020-10-20 RX ADMIN — DONEPEZIL HYDROCHLORIDE 10 MG: 5 TABLET, FILM COATED ORAL at 10:10

## 2020-10-20 RX ADMIN — HYDROCODONE BITARTRATE AND ACETAMINOPHEN 1 TABLET: 10; 325 TABLET ORAL at 05:10

## 2020-10-20 RX ADMIN — PRAVASTATIN SODIUM 40 MG: 40 TABLET ORAL at 10:10

## 2020-10-20 RX ADMIN — ASPIRIN 81 MG: 81 TABLET, COATED ORAL at 08:10

## 2020-10-20 RX ADMIN — CLOPIDOGREL 75 MG: 75 TABLET, FILM COATED ORAL at 08:10

## 2020-10-20 RX ADMIN — METHOCARBAMOL TABLETS 500 MG: 500 TABLET, COATED ORAL at 10:10

## 2020-10-20 RX ADMIN — METOPROLOL SUCCINATE 50 MG: 50 TABLET, EXTENDED RELEASE ORAL at 08:10

## 2020-10-20 RX ADMIN — Medication 1750 MG: at 11:10

## 2020-10-20 RX ADMIN — ISOSORBIDE MONONITRATE 60 MG: 30 TABLET, EXTENDED RELEASE ORAL at 08:10

## 2020-10-20 RX ADMIN — VANCOMYCIN HYDROCHLORIDE 1750 MG: 10 INJECTION, POWDER, LYOPHILIZED, FOR SOLUTION INTRAVENOUS at 05:10

## 2020-10-20 RX ADMIN — Medication 10 ML: at 12:10

## 2020-10-20 RX ADMIN — ENOXAPARIN SODIUM 40 MG: 40 INJECTION SUBCUTANEOUS at 08:10

## 2020-10-20 RX ADMIN — METHOCARBAMOL 500 MG: 500 TABLET ORAL at 04:10

## 2020-10-20 RX ADMIN — MICONAZOLE NITRATE: 20 OINTMENT TOPICAL at 10:10

## 2020-10-20 RX ADMIN — PANTOPRAZOLE SODIUM 40 MG: 40 TABLET, DELAYED RELEASE ORAL at 05:10

## 2020-10-20 RX ADMIN — ENOXAPARIN SODIUM 40 MG: 40 INJECTION SUBCUTANEOUS at 10:10

## 2020-10-20 RX ADMIN — METHOCARBAMOL 500 MG: 500 TABLET ORAL at 12:10

## 2020-10-20 RX ADMIN — Medication 10 ML: at 06:10

## 2020-10-20 RX ADMIN — HYDROCODONE BITARTRATE AND ACETAMINOPHEN 1 TABLET: 10; 325 TABLET ORAL at 04:10

## 2020-10-20 RX ADMIN — ACETAMINOPHEN 500 MG: 500 TABLET ORAL at 03:10

## 2020-10-20 RX ADMIN — LISINOPRIL 2.5 MG: 2.5 TABLET ORAL at 08:10

## 2020-10-20 RX ADMIN — MICONAZOLE NITRATE: 20 OINTMENT TOPICAL at 08:10

## 2020-10-20 RX ADMIN — DULOXETINE HYDROCHLORIDE 60 MG: 30 CAPSULE, DELAYED RELEASE ORAL at 08:10

## 2020-10-20 RX ADMIN — ACETAMINOPHEN 500 MG: 500 TABLET ORAL at 12:10

## 2020-10-20 RX ADMIN — BISACODYL 5 MG: 5 TABLET, COATED ORAL at 08:10

## 2020-10-20 NOTE — PLAN OF CARE
Problem: Adult Inpatient Plan of Care  Goal: Plan of Care Review  Outcome: Ongoing, Progressing  Goal: Absence of Hospital-Acquired Illness or Injury  Outcome: Ongoing, Progressing  Intervention: Identify and Manage Fall Risk  Flowsheets (Taken 10/20/2020 0414)  Safety Promotion/Fall Prevention:   assistive device/personal item within reach   bed alarm set   Fall Risk signage in place   Fall Risk reviewed with patient/family   side rails raised x 3   instructed to call staff for mobility  Intervention: Prevent VTE (venous thromboembolism)  Flowsheets (Taken 10/20/2020 0414)  VTE Prevention/Management: ROM (active) performed  Goal: Optimal Comfort and Wellbeing  Outcome: Ongoing, Progressing  Goal: Readiness for Transition of Care  Outcome: Ongoing, Progressing  Intervention: Mutually Develop Transition Plan  Flowsheets (Taken 10/20/2020 0414)  Equipment Currently Used at Home:   bedside commode   [facility] bed   slide board   other (see comments)   wheelchair  Patient/Family in Agreement with Plan: unable to assess  Able to Return to Prior Arrangements: yes  Transportation Anticipated: (will need transportation home as patient is unable to ambulate) health plan transportation  Patient's perception of discharge disposition:   home health   home or selfcare  Is patient able to care for self after discharge?: No  Patient currently receives home health services?: Yes  How many people do you have in your home that can help with your care after discharge?: 2     Problem: Skin Injury Risk Increased  Goal: Skin Health and Integrity  Outcome: Ongoing, Progressing  Intervention: Optimize Skin Protection  Flowsheets (Taken 10/20/2020 0414)  Pressure Reduction Techniques: weight shift assistance provided  Head of Bed (HOB): HOB lowered  VSS and pain managed through the overnight with PRN pain medication being given as ordered and as requested by the patient which is every 6 hours.  Patient still has increased amounts of pain  with movement and perineal care.  Patient states the desire to return to home environment after discharge and was expecting to be discharged home on the previous shift as all disciplines has agreed to follow up care in the outpatient setting or so it appears after reviewing the progress notes.  Patient is without distress in the overnight shift and has rested well.

## 2020-10-20 NOTE — DISCHARGE SUMMARY
"[facility] Hospital Medicine  Telemedicine Discharge Summary      Patient Name: Martina Betancourt  MRN: [id number]  Admission Date: 10/8/2020  Hospital Length of Stay: 12 days  Discharge Date and Time:  10/20/2020 1:30 PM  Attending Physician: Cori Ramey MD   Discharging Provider: Cori Ramey MD  Primary Care Provider: Joe Fuller Iii, MD    [place] [facility] Medicine Team: [facility] VIRTUAL TEAM 10 Cori Ramey MD  Patient was transferred to the telemedicine service on: 10/20/2020  This document was prepared by chart review and may not directly reflect my personal knowledge of the patient's case, clinical course, or significant events during the hospital stay.      HPI:  70F with Alzheimer's dz, HTN, HLD, CAD, YOVANI, obesity, recent admit x2 for MRSA bacteremia and subsequent pneumonia (?and UTI), admitted to  at [facility] via the ED for excruciating flank pain x 24 hrs, CT renal stone study negative for renal stones but revealed diskitis.   Transfer center was called for transfer for spine services, and neurosurgery rec'd to obtain a dedicated MRI T spine prior to transfer.  MRI T spine (non-contrast) revealed "Evidence of discitis/osteomyelitis at T9-T10 with possible small associated abscesses within the adjacent soft tissues"  In terms of advanced directives, "The patient has some dementia and during her last few hospitalizations was placed do not resuscitate because of her deteriorating condition." Of note, after most recent admission with MRSA bacteremia, patient was treated with IV linezolid for 8 days which was stopped at discharge.   On Vanc and Zosyn since yesterday.  Neuro exam: "worsening B paraparesis" per verbal report per discussion with referring team, and Physical Exam in Epic note with "weakness present," "abnormal reflexes," "sensory deficit"   Exam is challenging as she has dementia and is obese and weak at baseline (baseline unclear).    On arrival, the patient is alert " and oriented. She reports that she has chronic back pain at baseline, but she developed significant worsening of her pain on the night prior to presentation (10/6). She states that the pain has been significant ever since and only sometimes controlled by pain medications. She denies any other associated symptoms such as fevers, chills, nausea, vomiting, malaise, cough, or focal weakness/numbness.       * No surgery found *      Hospital Course:     MRSA Bacteremia  Discitis  -With recent hx of MRSA bacteremia presents with back pain. MRI shows: Evidence of discitis/osteomyelitis at T9-T10 with possible small associated abscesses within the adjacent soft tissues  -Neurosurgery consulted regarding need for intervention  -Continued broad spectrum antibiotics with vancomycin/Zosyn therapy for now, but strong suspicion for MRSA as cause.  -Blood cultures ordered as they were not drawn at OSH prior to transfer (note that abx have already been given)  -Consulted ID for further recommendations regarding need for Zosyn  -Remains on vancomycin and ceftriaxone.   -s/p IR abscess drainage on 10/16, micro labs obtained  -Final recs from ID: Recommend Vancomycin 1750 mg IV q 24 hours x 8 weeks     End date of IV antibiotics: 12/3/20     Weekly outpatient laboratory on Monday or Tuesday while on IV antibiotics.   · CBC  · CMP  · ESR and CRP  · Vancomycin trough. Target 15-20     HTN  -Continue home BP meds     Dementia without behavioral disturbance  -Continue donepezil PRN  -Delirium precautions     CAD  -With recent NSTEMI during admission last month. Was deemed Poor cath lab candidate - dementia/DNR  -Continue GDMT with ASA, B-blocker, ACEi, and statin. Held Plavix initially as patient oes not have an absolute indication until neurosurgery evaluated need for intervention  -resumed Plavix    Consults:   Consults (From admission, onward)        Status Ordering Provider     Inpatient consult to Infectious Diseases  Once      Provider:  (Not yet assigned)    Completed SOLANGE RUEDA     Inpatient consult to Interventional Radiology  Once     Provider:  (Not yet assigned)    Completed HARJINDER BIRMINGHAM     Inpatient consult to Midline team  Once     Provider:  (Not yet assigned)    Completed SORAIDA DAY     Inpatient consult to Neurosurgery  Once     Provider:  (Not yet assigned)    Acknowledged SOLANGE RUEDA     Inpatient consult to Neurosurgery  Once     Provider:  (Not yet assigned)    Completed HARJINDER BIRMINGHAM     Inpatient consult to PICC team ([facility])  Once     Provider:  (Not yet assigned)    Completed HARJINDER BIRMINGHAM     Inpatient consult to PICC team ([facility])  Once     Provider:  (Not yet assigned)    Completed SORAIDA DAY     Inpatient consult to Registered Dietitian/Nutritionist  Once     Provider:  (Not yet assigned)    Completed HARJINDER BIRMINGHAM     Inpatient virtual consult to Hospital Medicine  Once     Provider:  (Not yet assigned)    Completed SORAIDA DAY     IP consult to case management  Once     Provider:  (Not yet assigned)    Completed HARJINDER BIRMINGHAM     IP consult to case management  Once     Provider:  (Not yet assigned)    Completed HARJINDER BIRMINGHAM     Pharmacy to dose Vancomycin consult  Once     Provider:  (Not yet assigned)    Acknowledged SOLANGE RUEDA          Final Active Diagnoses:    Diagnosis Date Noted POA    PRINCIPAL PROBLEM:  Discitis [M46.40] 10/08/2020 Yes    Back pain [M54.9]  Yes    Bacteremia due to Staphylococcus aureus [R78.81, B95.61]  Yes    Hydronephrosis [N13.30] 10/09/2020 Yes    HTN (hypertension) [I10] 10/08/2020 Yes    CAD (coronary artery disease) [I25.10] 10/08/2020 Yes    MRSA bacteremia [R78.81, B95.62] 07/26/2020 Yes    Dementia without behavioral disturbance [F03.90] 07/26/2020 Yes    Do not resuscitate [Z66] 07/26/2020 Yes      Problems Resolved During this Admission:      Discharged Condition: stable    Disposition: Skilled Nursing  Facility    Follow Up:  Follow-up Information     Joe Fuller Iii, MD.    Specialty: Internal Medicine  Why: Outpatient Services  Contact information:  [facility] [address] [phone]                 Future Appointments   Date Time Provider Department Center   11/18/2020 11:30 AM LAYO Hightower Jr. [facility] ID [address]   12/3/2020  9:30 AM Jasmin Villagran MD [facility] ID [address]       Patient Instructions:   No discharge procedures on file.  Medications:  Transfer Medications (for Discharge Readmit only):   Medication Dose Route Frequency    acetaminophen tablet 500 mg  500 mg Oral TID    albuterol-ipratropium 2.5 mg-0.5 mg/3 mL nebulizer solution 3 mL  3 mL Nebulization Q6H PRN    aspirin EC tablet 81 mg  81 mg Oral Daily    bisacodyL EC tablet 5 mg  5 mg Oral Daily PRN    bisacodyL EC tablet 5 mg  5 mg Oral Daily    clopidogreL tablet 75 mg  75 mg Oral Daily    donepeziL tablet 10 mg  10 mg Oral QHS    DULoxetine DR capsule 60 mg  60 mg Oral Daily    enoxaparin injection 40 mg  40 mg Subcutaneous Q12H    ferrous sulfate EC tablet 325 mg  325 mg Oral QHS    [START ON 10/21/2020] furosemide tablet 40 mg  40 mg Oral Daily    hydrALAZINE tablet 50 mg  50 mg Oral Q8H PRN    HYDROcodone-acetaminophen  mg per tablet 1 tablet  1 tablet Oral Q6H PRN    isosorbide mononitrate 24 hr tablet 60 mg  60 mg Oral Daily    lisinopriL tablet 2.5 mg  2.5 mg Oral Daily    melatonin tablet 6 mg  6 mg Oral Nightly PRN    [START ON 10/21/2020] memantine CSpX 28 mg  28 mg Oral Daily    methocarbamoL tablet 500 mg  500 mg Oral QID    metoprolol succinate (TOPROL-XL) 24 hr tablet 50 mg  50 mg Oral Daily    miconazole nitrate 2% ointment   Topical (Top) BID    pantoprazole EC tablet 40 mg  40 mg Oral Before breakfast    pravastatin tablet 40 mg  40 mg Oral QHS    vancomycin 1.75 g in 5 % dextrose 500 mL IVPB  1,750 mg Intravenous Q24H        Home Medication List      ASK your doctor  about these medications    acetaminophen 325 MG tablet  Commonly known as: TYLENOL  Take 2 tablets (650 mg total) by mouth every 6 (six) hours as needed (HEADACHE, TEMPERATURE - FEVER > 100.4).     albuterol-ipratropium 2.5 mg-0.5 mg/3 mL nebulizer solution  Commonly known as: DUO-NEB  Take 3 mLs by nebulization every 6 (six) hours as needed for Wheezing or Shortness of Breath. Rescue     aspirin 81 MG EC tablet  Commonly known as: ECOTRIN     clopidogreL 75 mg tablet  Commonly known as: PLAVIX  Take 1 tablet (75 mg total) by mouth once daily.     donepeziL 10 MG tablet  Commonly known as: ARICEPT     DULoxetine 60 MG capsule  Commonly known as: CYMBALTA     ferrous sulfate 324 mg (65 mg iron) Tbec     furosemide 40 MG tablet  Commonly known as: LASIX  Take 1 tablet (40 mg total) by mouth once daily.     isosorbide mononitrate 60 MG 24 hr tablet  Commonly known as: IMDUR     lisinopriL 2.5 MG tablet  Commonly known as: PRINIVIL,ZESTRIL  Take 1 tablet (2.5 mg total) by mouth once daily.     memantine 28 mg Cspx  Commonly known as: NAMENDA XR     metoprolol succinate 50 MG 24 hr tablet  Commonly known as: TOPROL-XL     miconazole nitrate 2% 2 % Oint  Commonly known as: MICOTIN  Apply topically 2 (two) times daily.     pantoprazole 40 MG tablet  Commonly known as: PROTONIX  Take 1 tablet (40 mg total) by mouth before breakfast.     pravastatin 40 MG tablet  Commonly known as: PRAVACHOL            Pending Diagnostic Studies:     Procedure Component Value Units Date/Time    Specimen to Pathology, Radiology Other, please specify [[id number]] Collected: 10/16/20 1228    Order Status: Sent Lab Status: In process Updated: 10/16/20 1243        Indwelling Lines/Drains at time of discharge:   Lines/Drains/Airways     Peripherally Inserted Central Catheter Line            PICC Double Lumen 10/19/20 1114 right brachial 1 day                       Time spent on the discharge of patient: 50 minutes  Patient was seen and examined on  the date of discharge and determined to be suitable for discharge.  Time was spent speaking with consultants and case management, reviewing records, and/or discussing the plan of care with patient/family.  Start time: 1020  Chief complaint: Discitis  The patient location is: [facility]/9 A  The patient arrived at: 10/8/2020  9:52 PM  Present with the patient at the time of the telemed/virtual assessment: telepresenter   End time:  1025  Total time spent with patient: 5 min  I have assessed findings virtually using a telemedicine platform and with assistance of the bedside nurse or telemedicine presenter.  The attending portion of this evaluation, treatment, and documentation was performed per Cori Ramey MD via audiovisual.       Cori Ramey MD  Department of Hospital Medicine  [facility]

## 2020-10-20 NOTE — PLAN OF CARE
Rec is Skilled.  Waiting on acceptance.     10/20/20 1054   Discharge Reassessment   Assessment Type Discharge Planning Reassessment   Provided patient/caregiver education on the expected discharge date and the discharge plan Yes   Do you have any problems affording any of your prescribed medications? No   Discharge Plan A Skilled Nursing Facility   Discharge Plan B Home with family;Home Health   DME Needed Upon Discharge  other (see comments)  (tbd)   Patient choice form signed by patient/caregiver N/A   Anticipated Discharge Disposition SNF   Can the patient/caregiver answer the patient profile reliably? Yes, cognitively intact   Describe the patient's ability to walk at the present time. Major restrictions/daily assistance from another person   Post-Acute Status   Post-Acute Authorization Placement   Post-Acute Placement Status Pending Post-Acute Clinical Review   Discharge Delays None known at this time

## 2020-10-20 NOTE — PT/OT/SLP PROGRESS
Physical Therapy  Pt Not Seen    Patient Name:  Martina Betancourt   MRN:  [id number]    Patient not seen today secondary to pt with discharge orders in place    Viridiana Bermudez PTA  10/20/2020

## 2020-10-20 NOTE — PROGRESS NOTES
Pharmacokinetic Assessment Follow Up: IV Vancomycin    Vancomycin Regimen Plan:  - Vancomycin trough level (23-hour level) resulted at 20.5 mcg/mL; true trough is likely within goal of 15-20 mcg/mL  - Serum creatinine has been stable at 0.7-0.8 mg/dL  - Continue vancomycin 1750 mg IV every 24 hours  - Repeat trough in 5-7 days or sooner if renal function or clinical status changes      Drug levels (last 3 results):  Recent Labs   Lab Result Units 10/19/20  1545   Vancomycin-Trough ug/mL 20.5       Pharmacy will continue to follow and monitor vancomycin.    Please contact pharmacy at extension [phone] for questions regarding this assessment.    Thank you for the consult,   Kassie Aguero       Patient brief summary:  Martina Betancourt is a 70 y.o. female initiated on antimicrobial therapy with IV Vancomycin for treatment of bone/joint infection      Drug Allergies:   Review of patient's allergies indicates:   Allergen Reactions    Hydroxyzine hcl     Tizanidine        Actual Body Weight:   137 kg    Renal Function:   Estimated Creatinine Clearance: 96.2 mL/min (based on SCr of 0.8 mg/dL).    Dialysis Method (if applicable):  N/A

## 2020-10-20 NOTE — NURSING
Report called to LORAINE Currie at [facility]   Waiting on transport   Pt has complaints of pain; prn pain medication administered prior to dc  @ 1650  No S/S of distress; call light within reach; pt encouraged to call for assistance if needed will continue to assess/ monitor closely

## 2020-10-20 NOTE — PLAN OF CARE
Problem: Occupational Therapy Goal  Goal: Occupational Therapy Goal  Description: Updated Goals to be met by: 10/25/2020     Patient will increase functional independence with ADLs by performing:    Feeding with Minimal Assistance seated EOB.  UE Dressing with Minimal Assistance seated EOB.  Grooming while EOB with Minimal Assistance.  Rolling to Bilateral with Moderate Assistance.   Supine to sit with Moderate Assistance.  Stand pivot to BSC with Moderate Assistance.      Goals to be met by: 10/16/2020     Patient will increase functional independence with ADLs by performing:    Feeding with Minimal Assistance seated EOB.  UE Dressing with Minimal Assistance seated EOB.  Grooming while EOB with Minimal Assistance.  Bathing from edge of bed with wipes with Minimal Assistance.  Rolling to Bilateral with Moderate Assistance.   Supine to sit with Moderate Assistance.    Outcome: Ongoing, Progressing    Kelly Bacon OTR/L  Pager: [phone]  10/20/2020

## 2020-10-20 NOTE — CONSULTS
[facility] Hospital Medicine  Telemedicine Consult Note    Patient Name: Martina Betancourt  MRN: [id number]  Admission Date: 10/8/2020  Hospital Length of Stay: 11 days  Attending Physician: Shama Coronado MD   Primary Care Provider: Joe Fuller Iii, MD          Martina Betancourt has been accepted for transfer to [facility] and will be followed through telemedicine services beginning 10/20/20 at 7 AM.         Cori Ramey MD  Department of Hospital Medicine   [facility]

## 2020-10-20 NOTE — PT/OT/SLP PROGRESS
Occupational Therapy   Treatment    Name: Martina Betancourt  MRN: [id number]  Admitting Diagnosis:  Discitis       Recommendations:     Discharge Recommendations: nursing facility, skilled  Discharge Equipment Recommendations:  (TBD)  Barriers to discharge:  Decreased caregiver support, Other (Comment)(Pt requires increased assistance at current functional level)    Assessment:     Martina Betancourt is a 70 y.o. female with a medical diagnosis of Discitis.  She presents with performance deficits affecting function are weakness, impaired endurance, impaired self care skills, impaired functional mobilty, gait instability, impaired balance, impaired cardiopulmonary response to activity, decreased upper extremity function, decreased lower extremity function, pain, edema. Pt tolerated sitting EOB for 8 mins this date with SBA<>min A for sitting balance. Pt reported increased pain during transfers and supine scooting. Pt would benefit from continued skilled acute OT services in order to maximize independence and safety with ADLs and functional mobility to ensure safe return to PLOF in the least restrictive environment. OT recommending SNF placement once pt is medically appropriate for d/c.     Rehab Prognosis:  Good; patient would benefit from acute skilled OT services to address these deficits and reach maximum level of function.       Plan:     Patient to be seen 3 x/week to address the above listed problems via self-care/home management, therapeutic activities, therapeutic exercises, neuromuscular re-education  · Plan of Care Expires: 11/18/20  · Plan of Care Reviewed with: patient    Subjective     Pain/Comfort:  · Pain Rating 1: (Pt did not rate, increased pain in back with mobility and transfer)  · Pain Addressed 1: Distraction, Cessation of Activity    Objective:     Communicated with: RN prior to session.  Patient found HOB elevated with telemetry, pulse ox (continuous), peripheral IV, oxygen, bed alarm upon OT entry  to room. Pt agreeable to therapy session. Rehab tech present assisting therapist     General Precautions: Standard, fall, contact   Orthopedic Precautions:N/A   Braces: N/A     Occupational Performance:     Bed Mobility:    · Patient completed Rolling/Turning to Left with  maximal assistance and 2 persons  · Patient completed Rolling/Turning to Right with maximal assistance and 2 persons  · Patient completed Scooting/Bridging with total assistance, 2 persons and for supine scooting with bed in trendelenburg and max A x 2 persons for anterior scooting towards EOB to plant B feet on floor    · Patient completed Supine to Sit with maximal assistance and 2 persons for trunk elevation and bringing B LEs towards EOB via log roll technique   · Patient completed Sit to Supine with total assistance and 2 persons for trunk descent and bringing B LEs into bed.     Functional Mobility/Transfers:  · Not performed due to impaired activity tolerance     EOB sitting balance:   · Pt tolerated sitting EOB for 8 mins with CGA <>mod A for static sitting balance   · As pt progress she required CGA for sitting balance   · Verbal cues for controlled pursed lip breathing   · SpO2 ranged from 92-94%     Activities of Daily Living:  · Bathing: total assistance washing back while sitting EOB     AMPA 6 Click ADL: 9    Treatment & Education:   Pt educated on role of OT, POC, and goals for therapy.     POC was dicussed with patient/caregiver, who was included in its development and is in agreement with the identified goals and treatment plan.    Pt completed 1 sets of 10 reps of B LE AROM exercise program sitting unsupported EOB in order to work towards increasing LB strength/endurance and to maximize safety and (I) with mobility and self-care skills in standing.  Pt completed the following exercises with initial demonstration from therapist: hip flexion, LAQ, and ankle pumps. Pt required rest breaks in between sets.    Time provided for  therapeutic counseling and discussion of health disposition.    Educated on importance of EOB/OOB mobility, maintaining routine, sitting up in chair, and maximizing independence with ADLs during admission    Pt completed ADLs and functional mobility for treatment session as noted above    Attempted to bring HOB to at least 30* but pt unable to tolerate due to increased back pain    Pt/caregiver verbalized understanding and expressed no further concerns/questions.   Updated communication board with level of assist required (max A x 2 person assistance for supine<>sit)       Patient left HOB elevated with all lines intact, call button in reach, bed alarm on and RN notifiedEducation:      GOALS:   Multidisciplinary Problems     Occupational Therapy Goals        Problem: Occupational Therapy Goal    Goal Priority Disciplines Outcome Interventions   Occupational Therapy Goal     OT, PT/OT Ongoing, Progressing    Description: Updated Goals to be met by: 10/25/2020     Patient will increase functional independence with ADLs by performing:    Feeding with Minimal Assistance seated EOB.  UE Dressing with Minimal Assistance seated EOB.  Grooming while EOB with Minimal Assistance.  Rolling to Bilateral with Moderate Assistance.   Supine to sit with Moderate Assistance.  Stand pivot to C with Moderate Assistance.      Goals to be met by: 10/16/2020     Patient will increase functional independence with ADLs by performing:    Feeding with Minimal Assistance seated EOB.  UE Dressing with Minimal Assistance seated EOB.  Grooming while EOB with Minimal Assistance.  Bathing from edge of bed with wipes with Minimal Assistance.  Rolling to Bilateral with Moderate Assistance.   Supine to sit with Moderate Assistance.                     Time Tracking:     OT Date of Treatment: 10/20/20  OT Start Time: 0918  OT Stop Time: 0956  OT Total Time (min): 38 min    Billable Minutes:Therapeutic Activity 23  Therapeutic Exercise  15    Kelly Bacon, OT  10/20/2020

## 2020-10-20 NOTE — PLAN OF CARE
Patient to be discharged to [facility] Sk[facility].  Care deferred to [facility].  Patient to be transported via stretcher provided per PFC.Daughter informed of transfer to [facility]. CM not requested to make any follow up appointments.      Future Appointments   Date Time Provider Department Center   11/18/2020 11:30 AM LAYO Hightower Jr. [facility] ID [address]   12/3/2020 11:30 AM Claudette Hernandez PA-C [facility] ID [address]       10/20/20 1459   Final Note   Assessment Type Final Discharge Note   Anticipated Discharge Disposition SNF   Hospital Follow Up  Appt(s) scheduled?   (CM not requested to make any follow up appointment.)   Discharge plans and expectations educations in teach back method with documentation complete? Yes   Right Care Referral Info   Post Acute Recommendation SNF / Sub-Acute Rehab   Facility Name [facility]   Post-Acute Status   Post-Acute Authorization Placement   Post-Acute Placement Status Set-up Complete   Discharge Delays None known at this time

## 2020-10-20 NOTE — PLAN OF CARE
10/20/20 1446   Post-Acute Status   Post-Acute Authorization Placement   Post-Acute Placement Status Set-up Complete       Pt has been accepted by [facility]. Nurse can call report to [phone].  Transport setup with EMS for 3:45.  SW in contact with CM and Medical staff. Will continue to follow and offer support as needed.     Salinas Lopez, LMSW [facility]   Ext. [phone]

## 2020-10-21 PROBLEM — E44.1 MALNUTRITION OF MILD DEGREE: Status: ACTIVE | Noted: 2020-10-21

## 2020-10-21 PROBLEM — R53.81 DEBILITY: Status: ACTIVE | Noted: 2020-10-21

## 2020-10-21 LAB
BACTERIA SPEC ANAEROBE CULT: NORMAL
VANCOMYCIN TROUGH SERPL-MCNC: 19.4 UG/ML (ref 10–22)

## 2020-10-21 PROCEDURE — 94799 UNLISTED PULMONARY SVC/PX: CPT

## 2020-10-21 PROCEDURE — 25000003 PHARM REV CODE 250: Performed by: NURSE PRACTITIONER

## 2020-10-21 PROCEDURE — 94761 N-INVAS EAR/PLS OXIMETRY MLT: CPT

## 2020-10-21 PROCEDURE — 25000003 PHARM REV CODE 250: Performed by: INTERNAL MEDICINE

## 2020-10-21 PROCEDURE — 97802 MEDICAL NUTRITION INDIV IN: CPT

## 2020-10-21 PROCEDURE — 99304 PR NURSING FACILITY CARE, INIT, LOW SEVERITY: ICD-10-PCS | Mod: ,,, | Performed by: INTERNAL MEDICINE

## 2020-10-21 PROCEDURE — A4216 STERILE WATER/SALINE, 10 ML: HCPCS | Performed by: INTERNAL MEDICINE

## 2020-10-21 PROCEDURE — 99900035 HC TECH TIME PER 15 MIN (STAT)

## 2020-10-21 PROCEDURE — 99304 1ST NF CARE SF/LOW MDM 25: CPT | Mod: ,,, | Performed by: INTERNAL MEDICINE

## 2020-10-21 PROCEDURE — 97162 PT EVAL MOD COMPLEX 30 MIN: CPT

## 2020-10-21 PROCEDURE — 63600175 PHARM REV CODE 636 W HCPCS: Performed by: INTERNAL MEDICINE

## 2020-10-21 PROCEDURE — 27000221 HC OXYGEN, UP TO 24 HOURS

## 2020-10-21 PROCEDURE — 63600175 PHARM REV CODE 636 W HCPCS

## 2020-10-21 PROCEDURE — 80202 ASSAY OF VANCOMYCIN: CPT

## 2020-10-21 PROCEDURE — 97167 OT EVAL HIGH COMPLEX 60 MIN: CPT

## 2020-10-21 PROCEDURE — 11000004 HC SNF PRIVATE

## 2020-10-21 RX ORDER — MUPIROCIN 20 MG/G
OINTMENT TOPICAL 2 TIMES DAILY
Status: COMPLETED | OUTPATIENT
Start: 2020-10-21 | End: 2020-10-26

## 2020-10-21 RX ORDER — LISINOPRIL 10 MG/1
10 TABLET ORAL DAILY
Status: DISCONTINUED | OUTPATIENT
Start: 2020-10-22 | End: 2020-10-28

## 2020-10-21 RX ORDER — MEMANTINE HYDROCHLORIDE 10 MG/1
10 TABLET ORAL 2 TIMES DAILY
Status: DISCONTINUED | OUTPATIENT
Start: 2020-10-21 | End: 2020-12-01 | Stop reason: HOSPADM

## 2020-10-21 RX ADMIN — METHOCARBAMOL TABLETS 500 MG: 500 TABLET, COATED ORAL at 09:10

## 2020-10-21 RX ADMIN — MEMANTINE 10 MG: 10 TABLET ORAL at 09:10

## 2020-10-21 RX ADMIN — MICONAZOLE NITRATE: 20 OINTMENT TOPICAL at 08:10

## 2020-10-21 RX ADMIN — Medication 10 ML: at 12:10

## 2020-10-21 RX ADMIN — CLOPIDOGREL 75 MG: 75 TABLET, FILM COATED ORAL at 08:10

## 2020-10-21 RX ADMIN — MUPIROCIN: 20 OINTMENT TOPICAL at 09:10

## 2020-10-21 RX ADMIN — ENOXAPARIN SODIUM 40 MG: 40 INJECTION SUBCUTANEOUS at 08:10

## 2020-10-21 RX ADMIN — ACETAMINOPHEN 500 MG: 500 TABLET, FILM COATED ORAL at 03:10

## 2020-10-21 RX ADMIN — ACETAMINOPHEN 500 MG: 500 TABLET, FILM COATED ORAL at 09:10

## 2020-10-21 RX ADMIN — Medication 10 ML: at 10:10

## 2020-10-21 RX ADMIN — ASPIRIN 81 MG: 81 TABLET, COATED ORAL at 08:10

## 2020-10-21 RX ADMIN — METHOCARBAMOL TABLETS 500 MG: 500 TABLET, COATED ORAL at 11:10

## 2020-10-21 RX ADMIN — MEMANTINE 10 MG: 10 TABLET ORAL at 11:10

## 2020-10-21 RX ADMIN — METOPROLOL SUCCINATE 50 MG: 50 TABLET, EXTENDED RELEASE ORAL at 08:10

## 2020-10-21 RX ADMIN — DONEPEZIL HYDROCHLORIDE 10 MG: 5 TABLET, FILM COATED ORAL at 09:10

## 2020-10-21 RX ADMIN — ISOSORBIDE MONONITRATE 60 MG: 60 TABLET, EXTENDED RELEASE ORAL at 08:10

## 2020-10-21 RX ADMIN — HYDROCODONE BITARTRATE AND ACETAMINOPHEN 1 TABLET: 10; 325 TABLET ORAL at 06:10

## 2020-10-21 RX ADMIN — METHOCARBAMOL TABLETS 500 MG: 500 TABLET, COATED ORAL at 04:10

## 2020-10-21 RX ADMIN — METHOCARBAMOL TABLETS 500 MG: 500 TABLET, COATED ORAL at 08:10

## 2020-10-21 RX ADMIN — Medication 10 ML: at 06:10

## 2020-10-21 RX ADMIN — BISACODYL 5 MG: 5 TABLET, COATED ORAL at 08:10

## 2020-10-21 RX ADMIN — LISINOPRIL 2.5 MG: 2.5 TABLET ORAL at 08:10

## 2020-10-21 RX ADMIN — FUROSEMIDE 40 MG: 40 TABLET ORAL at 08:10

## 2020-10-21 RX ADMIN — MICONAZOLE NITRATE: 20 OINTMENT TOPICAL at 09:10

## 2020-10-21 RX ADMIN — FERROUS SULFATE TAB 325 MG (65 MG ELEMENTAL FE) 325 MG: 325 (65 FE) TAB at 09:10

## 2020-10-21 RX ADMIN — PANTOPRAZOLE SODIUM 40 MG: 40 TABLET, DELAYED RELEASE ORAL at 06:10

## 2020-10-21 RX ADMIN — DULOXETINE 60 MG: 30 CAPSULE, DELAYED RELEASE ORAL at 08:10

## 2020-10-21 RX ADMIN — Medication 1750 MG: at 10:10

## 2020-10-21 RX ADMIN — ACETAMINOPHEN 500 MG: 500 TABLET, FILM COATED ORAL at 08:10

## 2020-10-21 RX ADMIN — VANCOMYCIN HYDROCHLORIDE 1750 MG: 1 INJECTION, POWDER, LYOPHILIZED, FOR SOLUTION INTRAVENOUS at 12:10

## 2020-10-21 RX ADMIN — ENOXAPARIN SODIUM 40 MG: 40 INJECTION SUBCUTANEOUS at 09:10

## 2020-10-21 RX ADMIN — PRAVASTATIN SODIUM 40 MG: 40 TABLET ORAL at 09:10

## 2020-10-21 NOTE — PLAN OF CARE
10/21/20 1107   Discharge Assessment   Assessment Type Discharge Planning Assessment   Confirmed/corrected address and phone number on facesheet? Yes   Assessment information obtained from? Patient;Medical Record   Prior to hospitilization cognitive status: Alert/Oriented   Prior to hospitalization functional status: Completely Dependent   Current cognitive status: Alert/Oriented   Current Functional Status: Completely Dependent   Facility Arrived From: [facility]   Lives With child([place]), adult;grandchild([place])   Able to Return to Prior Arrangements yes   Is patient able to care for self after discharge? No   Who are your caregiver(s) and their phone number(s)? Sharon Turk (Daughter) [phone]   Patient's perception of discharge disposition home health   Readmission Within the Last 30 Days no previous admission in last 30 days   Patient currently being followed by outpatient case management? No   Patient currently receives home health services? Yes   Patient currently receives any other outside agency services? Yes   Name and contact number of agency or person providing outside services [facility]   Is it the patient/care giver preference to resume care with the current outside agency? Yes   Equipment Currently Used at Home CPAP;bedside commode;grab bar;oxygen;wheelchair;shower chair;walker, rolling;power chair;other (see comments)  (trapeze)   Do you have any problems affording any of your prescribed medications? No   Does the patient have transportation home? Yes   Transportation Anticipated other (see comments)  (TBD)   Discharge Plan A Home with family;Home Health   Discharge Plan B New Nursing Home placement - intermediate care facility   DME Needed Upon Discharge  none   Patient/Family in Agreement with Plan yes       Discharge assessment completed with patient and through the medical record. Patient reports that she was not walking prior to this admission but was able to transfer  independently. Patient now is requiring max assistance of two people. Patient lives at home with her daughter and granddaughter. Patient reports that she has 18 grandchildren to assist her with any needs. Patient had [facility] before admission and wishes to resume at discharge. SW to remain available as needed.     Ana Hawk LMSW

## 2020-10-21 NOTE — NURSING
Admitted per ambulance stretcher to room 312.  Patient awake and alert.  Voicing not complaints of SOB or nausea.  Pain to back on movement from stretcher to bed.  Slide board used.  Double lumen PICC line to right upper arm.  Intact and clamped.  Island dressing to right upper back intact without drainage.   Bilateral boots to feet.  Nasal cannula oxygent at 3 Liters.  Incontinent.  Purewick positioned in place to suction.

## 2020-10-21 NOTE — PROGRESS NOTES
Pharmacokinetic Initial Assessment: IV Vancomycin    Assessment/Plan:  Patient transferred from [facility] with vancomycin 1750mg q24, last trough yesterday 10/19/20, renal function is stable, will continue dose and check trough tomorrow 10/21/20 and dose accordingly   Desired empiric serum trough concentration is 15 to 20 mcg/mL  Draw vancomycin trough level 60 min prior to third dose on 10/21/20 at approximately 2200  Pharmacy will continue to follow and monitor vancomycin.      Please contact pharmacy at extension [phone] with any questions regarding this assessment.     Thank you for the consult,   Jailene Pace       Patient brief summary:  Martina Betancourt is a 70 y.o. female initiated on antimicrobial therapy with IV Vancomycin for treatment of suspected bacteremia    Drug Allergies:   Review of patient's allergies indicates:   Allergen Reactions    Hydroxyzine hcl     Tizanidine        Actual Body Weight:   137kg    Renal Function:   CrCl cannot be calculated (Unknown ideal weight.).,     Dialysis Method (if applicable):  N/A    CBC (last 72 hours):  Recent Labs   Lab Result Units 10/18/20  0358 10/19/20  0252 10/20/20  0359   WBC K/uL 9.06 8.51 9.74   Hemoglobin g/dL 10.4* 10.2* 10.9*   Hematocrit % 36.3* 35.5* 38.8   Platelets K/uL 351* 332 343   Gran% % 59.5 61.2 60.9   Lymph% % 24.3 20.9 23.3   Mono% % 14.6 15.6* 13.0   Eosinophil% % 1.1 1.5 2.1   Basophil% % 0.2 0.4 0.4   Differential Method  Automated Automated Automated       Metabolic Panel (last 72 hours):  Recent Labs   Lab Result Units 10/18/20  0358 10/19/20  0252 10/20/20  0359   Sodium mmol/L 139 140 142   Potassium mmol/L 3.1* 3.2* 3.9   Chloride mmol/L 95 97 97   CO2 mmol/L 36* 36* 34*   Glucose mg/dL 88 134* 80   BUN, Bld mg/dL 10 8 8   Creatinine mg/dL 0.7 0.8 0.8   Albumin g/dL 2.1* 2.2* 2.3*   Total Bilirubin mg/dL 0.2 0.1 0.3   Alkaline Phosphatase U/L 95 99 102   AST U/L 8* 9* 8*   ALT U/L 6* 5* 7*       Drug levels (last 3  results):  Recent Labs   Lab Result Units 10/19/20  1545   Vancomycin-Trough ug/mL 20.5       Microbiologic Results:  Microbiology Results (last 7 days)       ** No results found for the last 168 hours. **

## 2020-10-21 NOTE — HPI
71yo female patient with hx of alzheiners, CAD, HLD, HTN, myopathy, MI, obesity, sleep apnea and OA (knees non ambulatory uses gonzalez round). She was living at home with her daughter. Recently admitted to [facility] with MRSA bacteremia and subsequent pneumonia treated with Zyvox x 7 days with clearance of her bacteremia now admitted with back pain found to have T9-10 osteo discitis, T8-10 epidural phlegmon with associated paraverterbral abscesses. Spine infection likely hematogenous from under treated bacteremia. Neurosurgery has been consulted. She has been on Vancomycin and Ceftriaxone. Underwent T9-10 disc space biopsy with IR on 10/16. Cultures negative, though had been on IV antibiotics multiple days prior. Afebrile. HDS. Repeat blood cx sterile. ID rec cont vanc 1750mg q 24 till 12/3.

## 2020-10-21 NOTE — H&P
[facility] Medicine  History & Physical    Patient Name: Martina Betancourt  MRN: [id number]  Admission Date: 10/20/2020  Attending Physician: Layne Abbott MD   Primary Care Provider: Joe Fuller Iii, MD         Patient information was obtained from patient and past medical records.     Subjective:     Principal Problem:Debility    Chief Complaint: No chief complaint on file.       HPI: 69yo female patient with hx of alzheiners, CAD, HLD, HTN, myopathy, MI, obesity, sleep apnea and OA (knees non ambulatory uses gonzalez round). She was living at home with her daughter. Recently admitted to [facility] with MRSA bacteremia and subsequent pneumonia treated with Zyvox x 7 days with clearance of her bacteremia now admitted with back pain found to have T9-10 osteo discitis, T8-10 epidural phlegmon with associated paraverterbral abscesses. Spine infection likely hematogenous from under treated bacteremia. Neurosurgery has been consulted. She has been on Vancomycin and Ceftriaxone. Underwent T9-10 disc space biopsy with IR on 10/16. Cultures negative, though had been on IV antibiotics multiple days prior. Afebrile. HDS. Repeat blood cx sterile. ID rec cont vanc 1750mg q 24 till 12/3.     Past Medical History:   Diagnosis Date    Alzheimer disease     Alzheimer disease     Coronary artery disease     Hyperlipidemia     Hypertension     Myopathy     Non-ST elevation (NSTEMI) myocardial infarction     Obesity     Pneumonia     Sleep apnea        Past Surgical History:   Procedure Laterality Date    APPENDECTOMY      APPENDECTOMY      CHOLECYSTECTOMY      CORONARY STENT PLACEMENT      HYSTERECTOMY      TONSILLECTOMY         Review of patient's allergies indicates:   Allergen Reactions    Hydroxyzine hcl     Tizanidine        Current Facility-Administered Medications on File Prior to Encounter   Medication    [DISCONTINUED] acetaminophen tablet 500 mg    [DISCONTINUED]  albuterol-ipratropium 2.5 mg-0.5 mg/3 mL nebulizer solution 3 mL    [DISCONTINUED] aspirin EC tablet 81 mg    [DISCONTINUED] bisacodyL EC tablet 5 mg    [DISCONTINUED] bisacodyL EC tablet 5 mg    [DISCONTINUED] clopidogreL tablet 75 mg    [DISCONTINUED] donepeziL tablet 10 mg    [DISCONTINUED] DULoxetine DR capsule 60 mg    [DISCONTINUED] enoxaparin injection 40 mg    [DISCONTINUED] ferrous sulfate EC tablet 325 mg    [DISCONTINUED] furosemide tablet 40 mg    [DISCONTINUED] glucose chewable tablet 16 g    [DISCONTINUED] glucose chewable tablet 24 g    [DISCONTINUED] hydrALAZINE tablet 50 mg    [DISCONTINUED] HYDROcodone-acetaminophen  mg per tablet 1 tablet    [DISCONTINUED] HYDROmorphone injection 0.5 mg    [DISCONTINUED] isosorbide mononitrate 24 hr tablet 60 mg    [DISCONTINUED] lisinopriL tablet 2.5 mg    [DISCONTINUED] melatonin tablet 6 mg    [DISCONTINUED] memantine CSpX 28 mg    [DISCONTINUED] methocarbamoL tablet 500 mg    [DISCONTINUED] metoprolol succinate (TOPROL-XL) 24 hr tablet 50 mg    [DISCONTINUED] miconazole nitrate 2% ointment    [DISCONTINUED] ondansetron injection 4 mg    [DISCONTINUED] pantoprazole EC tablet 40 mg    [DISCONTINUED] pravastatin tablet 40 mg    [DISCONTINUED] prochlorperazine injection Soln 5 mg    [DISCONTINUED] sodium chloride 0.9% flush 10 mL    [DISCONTINUED] sodium chloride 0.9% flush 10 mL    [DISCONTINUED] sodium chloride 0.9% flush 10 mL    [DISCONTINUED] vancomycin - pharmacy to dose    [DISCONTINUED] vancomycin 1.75 g in 5 % dextrose 500 mL IVPB     Current Outpatient Medications on File Prior to Encounter   Medication Sig    acetaminophen (TYLENOL) 325 MG tablet Take 2 tablets (650 mg total) by mouth every 6 (six) hours as needed (HEADACHE, TEMPERATURE - FEVER > 100.4).    albuterol-ipratropium (DUO-NEB) 2.5 mg-0.5 mg/3 mL nebulizer solution Take 3 mLs by nebulization every 6 (six) hours as needed for Wheezing or Shortness of  Breath. Rescue    aspirin (ECOTRIN) 81 MG EC tablet Take 81 mg by mouth once daily.    clopidogreL (PLAVIX) 75 mg tablet Take 1 tablet (75 mg total) by mouth once daily.    donepeziL (ARICEPT) 10 MG tablet Take 10 mg by mouth every evening.    DULoxetine (CYMBALTA) 60 MG capsule Take 60 mg by mouth once daily.    ferrous sulfate 324 mg (65 mg iron) TbEC Take 325 mg by mouth every evening.    furosemide (LASIX) 40 MG tablet Take 1 tablet (40 mg total) by mouth once daily.    isosorbide mononitrate (IMDUR) 60 MG 24 hr tablet Take 60 mg by mouth once daily.    lisinopriL (PRINIVIL,ZESTRIL) 2.5 MG tablet Take 1 tablet (2.5 mg total) by mouth once daily.    memantine (NAMENDA XR) 28 mg CSpX Take 28 mg by mouth once daily.    metoprolol succinate (TOPROL-XL) 50 MG 24 hr tablet Take 50 mg by mouth once daily.    miconazole nitrate 2% (MICOTIN) 2 % Oint Apply topically 2 (two) times daily.    pantoprazole (PROTONIX) 40 MG tablet Take 1 tablet (40 mg total) by mouth before breakfast.    pravastatin (PRAVACHOL) 40 MG tablet Take 40 mg by mouth every evening.     Family History     None        Tobacco Use    Smoking status: Unknown If Ever Smoked   Substance and Sexual Activity    Alcohol use: Not Currently    Drug use: Never    Sexual activity: Not Currently     Review of Systems   Constitutional: Negative for activity change, fatigue, fever and unexpected weight change.   HENT: Negative for congestion, ear pain, hearing loss, rhinorrhea and sore throat.    Eyes: Negative for redness and visual disturbance.   Respiratory: Negative for cough, shortness of breath and wheezing.    Cardiovascular: Negative for chest pain, palpitations and leg swelling.   Gastrointestinal: Negative for abdominal pain, constipation, diarrhea, nausea and vomiting.   Genitourinary: Negative for dysuria, frequency and urgency.   Musculoskeletal: Negative for back pain, joint swelling and neck pain.   Skin: Negative for color change,  rash and wound.   Neurological: Negative for dizziness, tremors, weakness, light-headedness and headaches.     Objective:     Vital Signs (Most Recent):  Temp: 98.7 °F (37.1 °C) (10/21/20 0856)  Pulse: 64 (10/21/20 0856)  Resp: 16 (10/21/20 0856)  BP: (!) 179/84 (10/21/20 0856)  SpO2: 95 % (10/21/20 0856) Vital Signs (24h Range):  Temp:  [98.7 °F (37.1 °C)] 98.7 °F (37.1 °C)  Pulse:  [64-66] 64  Resp:  [16-20] 16  SpO2:  [93 %-97 %] 95 %  BP: (179-180)/(84) 179/84     Weight: 133 kg (293 lb 3.4 oz)  Body mass index is 42.07 kg/m².    Physical Exam  Vitals signs and nursing note reviewed.   Constitutional:       General: She is not in acute distress.     Appearance: She is well-developed.   HENT:      Head: Normocephalic and atraumatic.      Right Ear: External ear normal.      Left Ear: External ear normal.      Nose: Nose normal.      Mouth/Throat:      Mouth: Mucous membranes are moist.      Pharynx: Oropharynx is clear.   Eyes:      General:         Right eye: No discharge.         Left eye: No discharge.      Extraocular Movements: Extraocular movements intact.      Conjunctiva/sclera: Conjunctivae normal.      Pupils: Pupils are equal, round, and reactive to light.   Neck:      Musculoskeletal: Neck supple.      Thyroid: No thyromegaly.   Cardiovascular:      Rate and Rhythm: Normal rate and regular rhythm.      Heart sounds: No murmur.   Pulmonary:      Effort: Pulmonary effort is normal. No respiratory distress.      Breath sounds: Normal breath sounds. No wheezing or rales.   Abdominal:      General: Bowel sounds are normal. There is no distension.      Palpations: Abdomen is soft.      Tenderness: There is no abdominal tenderness.   Lymphadenopathy:      Cervical: No cervical adenopathy.   Skin:     General: Skin is warm and dry.   Neurological:      Mental Status: She is alert and oriented to person, place, and time.      Cranial Nerves: No cranial nerve deficit.   Psychiatric:         Behavior: Behavior  normal.           CRANIAL NERVES     CN III, IV, VI   Pupils are equal, round, and reactive to light.       Significant Labs:   CBC:   Recent Labs   Lab 10/20/20  0359   WBC 9.74   HGB 10.9*   HCT 38.8        CMP:   Recent Labs   Lab 10/20/20  0359      K 3.9   CL 97   CO2 34*   GLU 80   BUN 8   CREATININE 0.8   CALCIUM 10.0   PROT 7.1   ALBUMIN 2.3*   BILITOT 0.3   ALKPHOS 102   AST 8*   ALT 7*   ANIONGAP 11   EGFRNONAA >60.0     vanc trough 20.5    Body Fluid Type  Other (Specify)    Comment: T9-T10   Fluid Appearance  Clear    Fluid Color  Icteric    WBC, Body Fluid /cu mm 10    Comment: Reference ranges for body fluids not established.   Correlate clinically.    Segs, Fluid % 65    Lymphs, Fluid % 23    Monocytes/Macrophages, Fluid % 12    Aerobic culture no growth   Fungal culture in process  10/8 blood culture NGTD x 5    9/23 METHICILLIN RESISTANT STAPHYLOCOCCUS AUREUS  Clindamycin <=0.5 mcg/mL Sensitive      Erythromycin <=0.5 mcg/mL Sensitive     Oxacillin >2 mcg/mL Resistant     Penicillin 8 mcg/mL Resistant     Tetracycline <=4 mcg/mL Sensitive     Trimeth/Sulfa <=0.5/9.5 m... Sensitive     Vancomycin 2 mcg/mL Sensitive      Significant Imaging:     10/19 CXR There is cardiomegaly.  Lungs are clear.  Central line tip upper SVC    10/13 CT lumbar   Findings above consistent with T9-T10 discitis/osteomyelitis with multilocular paravertebral abscesses, better evaluated on MRI 10/12/2020.  No significant retropulsion osseous fragments into the canal.     Multilevel lumbar spondylosis resulting in moderate to severe spinal canal stenosis from L2-L3 through L4-L5 and severe bilateral neural foraminal narrowing at L5-S1.     Mild multilevel thoracic spondylosis, detailed above.     Bilateral pleural effusions, larger on the left with compressive atelectasis/volume loss of adjacent lung parenchyma.     Moderate-large size hiatal hernia     Multi-vessel coronary artery atherosclerosis.     10/13 CT  thoracic spine   Findings above consistent with T9-T10 discitis/osteomyelitis with multilocular paravertebral abscesses, better evaluated on MRI 10/12/2020.  No significant retropulsion osseous fragments into the canal.     Multilevel lumbar spondylosis resulting in moderate to severe spinal canal stenosis from L2-L3 through L4-L5 and severe bilateral neural foraminal narrowing at L5-S1.     Mild multilevel thoracic spondylosis, detailed above.     Bilateral pleural effusions, larger on the left with compressive atelectasis/volume loss of adjacent lung parenchyma.     Moderate-large size hiatal hernia     Multi-vessel coronary artery atherosclerosis.    10/12 MRI thoracic Discitis/osteomyelitis at the T9-T10 level with multilocular paravertebral abscesses similar to prior exam.     T8-T10 epidural phlegmon results in moderate narrowing of the thecal sac. There is associated nerve root enhancement, which may be seen with arachnoiditis.     Bilateral complex pleural effusions with enhancement.  Empyema is a consideration.  Consider further evaluation with contrast enhanced chest CT.     Multilevel mild degenerative changes and disc bulges in the thoracic spine, most prominent at T5-T6.    10/12 MRI lumbar No evidence for spondylodiscitis.     Bilateral L5 spondylolysis with grade 2 anterolisthesis.     Multilevel degenerative changes of the lumbar spine detailed above.  Moderate to severe spinal canal stenosis noted at L2-L5.  Severe neural foraminal narrowing noted at L5-S1.    9/23 EKG Normal sinus rhythm  Nonspecific ST and T wave abnormality  Abnormal ECG  When compared with ECG of 10-AUG-2020 06:42,  Nonspecific T wave abnormality now evident in Lateral leads  Confirmed by Willie Matthews MD ([id number]) on 9/23/2020 3:05:10 PM    Assessment/Plan:     * Debility  Was walking with walker prior to pneumonia/bacteremia admit last month then after d.c was only w/c bound (gonzalez round) will add pt here and try and get her as  strong as poss as she lives at home with family.       Hydronephrosis  Cont asa, plavix, lasix, isosorbide, lisinopril, toprol and statin      CAD (coronary artery disease)  Cont asa, plavix, lasix, isosorbide, lisinopril, toprol and statin      HTN (hypertension)  Increase lisinopril 2.5>10mg daily      Discitis  vanc 1450mg IV every 24hr x 8 weeks total till 12/3 per ID   PT    Severe obesity (BMI >= 40)        Obstructive sleep apnea treated with continuous positive airway pressure (CPAP)  Bring home cpap      Dementia without behavioral disturbance  Cont namenda and aricpet         VTE Risk Mitigation (From admission, onward)         Ordered     enoxaparin injection 40 mg  Every 12 hours      10/20/20 3328                   Layne Abbott MD  Department of Hospital Medicine   [facility]

## 2020-10-21 NOTE — ASSESSMENT & PLAN NOTE
Was walking with walker prior to pneumonia/bacteremia admit last month then after d.c was only w/c bound (gonzalez round) will add pt here and try and get her as strong as poss as she lives at home with family.

## 2020-10-21 NOTE — SUBJECTIVE & OBJECTIVE
Past Medical History:   Diagnosis Date    Alzheimer disease     Alzheimer disease     Coronary artery disease     Hyperlipidemia     Hypertension     Myopathy     Non-ST elevation (NSTEMI) myocardial infarction     Obesity     Pneumonia     Sleep apnea        Past Surgical History:   Procedure Laterality Date    APPENDECTOMY      APPENDECTOMY      CHOLECYSTECTOMY      CORONARY STENT PLACEMENT      HYSTERECTOMY      TONSILLECTOMY         Review of patient's allergies indicates:   Allergen Reactions    Hydroxyzine hcl     Tizanidine        Current Facility-Administered Medications on File Prior to Encounter   Medication    [DISCONTINUED] acetaminophen tablet 500 mg    [DISCONTINUED] albuterol-ipratropium 2.5 mg-0.5 mg/3 mL nebulizer solution 3 mL    [DISCONTINUED] aspirin EC tablet 81 mg    [DISCONTINUED] bisacodyL EC tablet 5 mg    [DISCONTINUED] bisacodyL EC tablet 5 mg    [DISCONTINUED] clopidogreL tablet 75 mg    [DISCONTINUED] donepeziL tablet 10 mg    [DISCONTINUED] DULoxetine DR capsule 60 mg    [DISCONTINUED] enoxaparin injection 40 mg    [DISCONTINUED] ferrous sulfate EC tablet 325 mg    [DISCONTINUED] furosemide tablet 40 mg    [DISCONTINUED] glucose chewable tablet 16 g    [DISCONTINUED] glucose chewable tablet 24 g    [DISCONTINUED] hydrALAZINE tablet 50 mg    [DISCONTINUED] HYDROcodone-acetaminophen  mg per tablet 1 tablet    [DISCONTINUED] HYDROmorphone injection 0.5 mg    [DISCONTINUED] isosorbide mononitrate 24 hr tablet 60 mg    [DISCONTINUED] lisinopriL tablet 2.5 mg    [DISCONTINUED] melatonin tablet 6 mg    [DISCONTINUED] memantine CSpX 28 mg    [DISCONTINUED] methocarbamoL tablet 500 mg    [DISCONTINUED] metoprolol succinate (TOPROL-XL) 24 hr tablet 50 mg    [DISCONTINUED] miconazole nitrate 2% ointment    [DISCONTINUED] ondansetron injection 4 mg    [DISCONTINUED] pantoprazole EC tablet 40 mg    [DISCONTINUED] pravastatin tablet 40 mg     [DISCONTINUED] prochlorperazine injection Soln 5 mg    [DISCONTINUED] sodium chloride 0.9% flush 10 mL    [DISCONTINUED] sodium chloride 0.9% flush 10 mL    [DISCONTINUED] sodium chloride 0.9% flush 10 mL    [DISCONTINUED] vancomycin - pharmacy to dose    [DISCONTINUED] vancomycin 1.75 g in 5 % dextrose 500 mL IVPB     Current Outpatient Medications on File Prior to Encounter   Medication Sig    acetaminophen (TYLENOL) 325 MG tablet Take 2 tablets (650 mg total) by mouth every 6 (six) hours as needed (HEADACHE, TEMPERATURE - FEVER > 100.4).    albuterol-ipratropium (DUO-NEB) 2.5 mg-0.5 mg/3 mL nebulizer solution Take 3 mLs by nebulization every 6 (six) hours as needed for Wheezing or Shortness of Breath. Rescue    aspirin (ECOTRIN) 81 MG EC tablet Take 81 mg by mouth once daily.    clopidogreL (PLAVIX) 75 mg tablet Take 1 tablet (75 mg total) by mouth once daily.    donepeziL (ARICEPT) 10 MG tablet Take 10 mg by mouth every evening.    DULoxetine (CYMBALTA) 60 MG capsule Take 60 mg by mouth once daily.    ferrous sulfate 324 mg (65 mg iron) TbEC Take 325 mg by mouth every evening.    furosemide (LASIX) 40 MG tablet Take 1 tablet (40 mg total) by mouth once daily.    isosorbide mononitrate (IMDUR) 60 MG 24 hr tablet Take 60 mg by mouth once daily.    lisinopriL (PRINIVIL,ZESTRIL) 2.5 MG tablet Take 1 tablet (2.5 mg total) by mouth once daily.    memantine (NAMENDA XR) 28 mg CSpX Take 28 mg by mouth once daily.    metoprolol succinate (TOPROL-XL) 50 MG 24 hr tablet Take 50 mg by mouth once daily.    miconazole nitrate 2% (MICOTIN) 2 % Oint Apply topically 2 (two) times daily.    pantoprazole (PROTONIX) 40 MG tablet Take 1 tablet (40 mg total) by mouth before breakfast.    pravastatin (PRAVACHOL) 40 MG tablet Take 40 mg by mouth every evening.     Family History     None        Tobacco Use    Smoking status: Unknown If Ever Smoked   Substance and Sexual Activity    Alcohol use: Not Currently     Drug use: Never    Sexual activity: Not Currently     Review of Systems   Constitutional: Negative for activity change, fatigue, fever and unexpected weight change.   HENT: Negative for congestion, ear pain, hearing loss, rhinorrhea and sore throat.    Eyes: Negative for redness and visual disturbance.   Respiratory: Negative for cough, shortness of breath and wheezing.    Cardiovascular: Negative for chest pain, palpitations and leg swelling.   Gastrointestinal: Negative for abdominal pain, constipation, diarrhea, nausea and vomiting.   Genitourinary: Negative for dysuria, frequency and urgency.   Musculoskeletal: Negative for back pain, joint swelling and neck pain.   Skin: Negative for color change, rash and wound.   Neurological: Negative for dizziness, tremors, weakness, light-headedness and headaches.     Objective:     Vital Signs (Most Recent):  Temp: 98.7 °F (37.1 °C) (10/21/20 0856)  Pulse: 64 (10/21/20 0856)  Resp: 16 (10/21/20 0856)  BP: (!) 179/84 (10/21/20 0856)  SpO2: 95 % (10/21/20 0856) Vital Signs (24h Range):  Temp:  [98.7 °F (37.1 °C)] 98.7 °F (37.1 °C)  Pulse:  [64-66] 64  Resp:  [16-20] 16  SpO2:  [93 %-97 %] 95 %  BP: (179-180)/(84) 179/84     Weight: 133 kg (293 lb 3.4 oz)  Body mass index is 42.07 kg/m².    Physical Exam  Vitals signs and nursing note reviewed.   Constitutional:       General: She is not in acute distress.     Appearance: She is well-developed.   HENT:      Head: Normocephalic and atraumatic.      Right Ear: External ear normal.      Left Ear: External ear normal.      Nose: Nose normal.      Mouth/Throat:      Mouth: Mucous membranes are moist.      Pharynx: Oropharynx is clear.   Eyes:      General:         Right eye: No discharge.         Left eye: No discharge.      Extraocular Movements: Extraocular movements intact.      Conjunctiva/sclera: Conjunctivae normal.      Pupils: Pupils are equal, round, and reactive to light.   Neck:      Musculoskeletal: Neck supple.       Thyroid: No thyromegaly.   Cardiovascular:      Rate and Rhythm: Normal rate and regular rhythm.      Heart sounds: No murmur.   Pulmonary:      Effort: Pulmonary effort is normal. No respiratory distress.      Breath sounds: Normal breath sounds. No wheezing or rales.   Abdominal:      General: Bowel sounds are normal. There is no distension.      Palpations: Abdomen is soft.      Tenderness: There is no abdominal tenderness.   Lymphadenopathy:      Cervical: No cervical adenopathy.   Skin:     General: Skin is warm and dry.   Neurological:      Mental Status: She is alert and oriented to person, place, and time.      Cranial Nerves: No cranial nerve deficit.   Psychiatric:         Behavior: Behavior normal.           CRANIAL NERVES     CN III, IV, VI   Pupils are equal, round, and reactive to light.       Significant Labs:   CBC:   Recent Labs   Lab 10/20/20  0359   WBC 9.74   HGB 10.9*   HCT 38.8        CMP:   Recent Labs   Lab 10/20/20  0359      K 3.9   CL 97   CO2 34*   GLU 80   BUN 8   CREATININE 0.8   CALCIUM 10.0   PROT 7.1   ALBUMIN 2.3*   BILITOT 0.3   ALKPHOS 102   AST 8*   ALT 7*   ANIONGAP 11   EGFRNONAA >60.0     vanc trough 20.5    Body Fluid Type  Other (Specify)    Comment: T9-T10   Fluid Appearance  Clear    Fluid Color  Icteric    WBC, Body Fluid /cu mm 10    Comment: Reference ranges for body fluids not established.   Correlate clinically.    Segs, Fluid % 65    Lymphs, Fluid % 23    Monocytes/Macrophages, Fluid % 12    Aerobic culture no growth   Fungal culture in process  10/8 blood culture NGTD x 5    9/23 METHICILLIN RESISTANT STAPHYLOCOCCUS AUREUS  Clindamycin <=0.5 mcg/mL Sensitive      Erythromycin <=0.5 mcg/mL Sensitive     Oxacillin >2 mcg/mL Resistant     Penicillin 8 mcg/mL Resistant     Tetracycline <=4 mcg/mL Sensitive     Trimeth/Sulfa <=0.5/9.5 m... Sensitive     Vancomycin 2 mcg/mL Sensitive      Significant Imaging:     10/19 CXR There is cardiomegaly.  Lungs are  clear.  Central line tip upper SVC    10/13 CT lumbar   Findings above consistent with T9-T10 discitis/osteomyelitis with multilocular paravertebral abscesses, better evaluated on MRI 10/12/2020.  No significant retropulsion osseous fragments into the canal.     Multilevel lumbar spondylosis resulting in moderate to severe spinal canal stenosis from L2-L3 through L4-L5 and severe bilateral neural foraminal narrowing at L5-S1.     Mild multilevel thoracic spondylosis, detailed above.     Bilateral pleural effusions, larger on the left with compressive atelectasis/volume loss of adjacent lung parenchyma.     Moderate-large size hiatal hernia     Multi-vessel coronary artery atherosclerosis.     10/13 CT thoracic spine   Findings above consistent with T9-T10 discitis/osteomyelitis with multilocular paravertebral abscesses, better evaluated on MRI 10/12/2020.  No significant retropulsion osseous fragments into the canal.     Multilevel lumbar spondylosis resulting in moderate to severe spinal canal stenosis from L2-L3 through L4-L5 and severe bilateral neural foraminal narrowing at L5-S1.     Mild multilevel thoracic spondylosis, detailed above.     Bilateral pleural effusions, larger on the left with compressive atelectasis/volume loss of adjacent lung parenchyma.     Moderate-large size hiatal hernia     Multi-vessel coronary artery atherosclerosis.    10/12 MRI thoracic Discitis/osteomyelitis at the T9-T10 level with multilocular paravertebral abscesses similar to prior exam.     T8-T10 epidural phlegmon results in moderate narrowing of the thecal sac. There is associated nerve root enhancement, which may be seen with arachnoiditis.     Bilateral complex pleural effusions with enhancement.  Empyema is a consideration.  Consider further evaluation with contrast enhanced chest CT.     Multilevel mild degenerative changes and disc bulges in the thoracic spine, most prominent at T5-T6.    10/12 MRI lumbar No evidence for  spondylodiscitis.     Bilateral L5 spondylolysis with grade 2 anterolisthesis.     Multilevel degenerative changes of the lumbar spine detailed above.  Moderate to severe spinal canal stenosis noted at L2-L5.  Severe neural foraminal narrowing noted at L5-S1.    9/23 EKG Normal sinus rhythm  Nonspecific ST and T wave abnormality  Abnormal ECG  When compared with ECG of 10-AUG-2020 06:42,  Nonspecific T wave abnormality now evident in Lateral leads  Confirmed by Willie Matthews MD ([id number]) on 9/23/2020 3:05:10 PM

## 2020-10-21 NOTE — PROGRESS NOTES
Nursing Notes    Walking rounding.  Report given to Miya BARON.  Patient asleep.  Respirations deep even.l  Huddle Comments

## 2020-10-21 NOTE — PLAN OF CARE
Patient admitted as SWING, Assessment complete per flow sheet, AAOX4, RR even unlabored BBS diminished, on 3 L NC. Abdomen soft, non distended, with active bowel sounds x 4 quads.Tolerating diet well. Dressing to back C/D/I. PICC to RT arm SL, Dsg C/D/I.  Medications administered per MAR, tolerated well. HERMELINDA heels elevated off bed with pillows and heel boots, daily betadine applied to pressure ulcer. safety precautions maintained, bed alarm on, free from falls/ injury, call bell in reach, instructed to call for needs, voiced understanding, agrees with plan of care.    PT seen patient, see notes.

## 2020-10-21 NOTE — PT/OT/SLP EVAL
"Occupational Therapy   Evaluation    Name: Martina Betancourt  MRN: [id number]  Admitting Diagnosis:  Debility      Recommendations:     Discharge Recommendations: nursing facility, basic  Discharge Equipment Recommendations:  lift device  Barriers to discharge:       Assessment:     Martina Betancourt is a 70 y.o. female with a medical diagnosis of Debility.  She presents with decreased independence due to several hospitalizations since July. Patient reports she was able to complete ADLs with Mod I with Hoveround chair prior to July 2020. Patient with sevre pain in back per report and significant deconditioning due to frequent hospitalizations. Patient would benefit from skilled OT services at this time to reain independence with self care and transfers.     Performance deficits affecting function: weakness, gait instability, decreased upper extremity function, decreased ROM, impaired cardiopulmonary response to activity, impaired endurance, impaired balance, decreased lower extremity function, decreased safety awareness, impaired self care skills, impaired cognition, pain, impaired functional mobilty.      Rehab Prognosis: Fair; patient would benefit from acute skilled OT services to address these deficits and reach maximum level of function.       Plan:     Patient to be seen 5 x/week to address the above listed problems via self-care/home management, therapeutic activities, therapeutic exercises  · Plan of Care Expires: 11/04/20  · Plan of Care Reviewed with: patient    Subjective     Chief Complaint: Pain in back with movement  Patient/Family Comments/goals: "to get better"    Occupational Profile:  Living Environment: with daughter and son in law in [facility] with no RISHABH with walk in shower with shower chair  Previous level of function: Mod I with Hoveround per patient report prior to July 2020  Roles and Routines: mother  Equipment Used at Home:  bedside commode, power chair, grab bar, hospital bed, walker, rolling, " wheelchair, shower chair, other (see comments)(trapeze)  Assistance upon Discharge: daughter, son in law    Pain/Comfort:  · Pain Rating 1: 9/10  · Location - Orientation 1: lower  · Location 1: back  · Pain Addressed 1: Reposition, Nurse notified  · Pain Rating Post-Intervention 1: 8/10    Patients cultural, spiritual, Adventist conflicts given the current situation: no    Objective:     Communicated with: Nursing prior to session.  Patient found supine with oxygen, peripheral IV, PureWick, pressure relief boots upon OT entry to room.    General Precautions: Standard, fall   Orthopedic Precautions:(nursing reports will clarify WB status of R LE due to abelardo fx 7/15/20)   Braces: N/A     Occupational Performance:    Bed Mobility:    · Patient completed Rolling/Turning to Right with maximal assistance    Functional Mobility/Transfers:  · Not completed, patient reporting severe pain with rolling, declining further mobility     Activities of Daily Living:  · Grooming: minimum assistance bed level to wipe face    Cognitive/Visual Perceptual:  Cognitive/Psychosocial Skills:     -       Oriented to: Person, Place, Time and Situation   -       Follows Commands/attention:Inattentive, Easily distracted and Follows one-step commands  -       Communication: scattered speech    Physical Exam:  Upper Extremity Range of Motion:  -       Right Upper Extremity: WFL  -       Left Upper Extremity: WFL  Upper Extremity Strength:    -       Right Upper Extremity: Fair  -       Left Upper Extremity: Fair   Strength:    -       Right Upper Extremity: WFL  -       Left Upper Extremity: WFL    AMPAC 6 Click ADL:  AMPAC Total Score: 11    Treatment & Education:  Therapist educated patient on role of OT and importance of participation in therapy to regain PLOF  Education:    Patient left supine with all lines intact, call button in reach and nursing notified    GOALS:   Multidisciplinary Problems     Occupational Therapy Goals         Problem: Occupational Therapy Goal    Goal Priority Disciplines Outcome Interventions   Occupational Therapy Goal     OT, PT/OT Ongoing, Progressing    Description: Goals to be met by: 11/4/2020     Patient will increase functional independence with ADLs by performing:    Feeding with Supervision.  UE Dressing with Supervision.  LE Dressing with Moderate Assistance.  Grooming while seated with Supervision.  Toileting from bedside commode with Minimal Assistance for hygiene and clothing management.   Sitting at edge of bed x5 minutes with Supervision.  Rolling to Bilateral with Supervision.   Supine to sit with Minimal Assistance.  Squat pivot transfers with Minimal Assistance.  Toilet transfer to bedside commode with Minimal Assistance.  Upper extremity exercise program x10 reps per handout, with assistance as needed.                     History:     Past Medical History:   Diagnosis Date    Alzheimer disease     Alzheimer disease     Coronary artery disease     Hyperlipidemia     Hypertension     Myopathy     Non-ST elevation (NSTEMI) myocardial infarction     Obesity     Pneumonia     Sleep apnea        Past Surgical History:   Procedure Laterality Date    APPENDECTOMY      APPENDECTOMY      CHOLECYSTECTOMY      CORONARY STENT PLACEMENT      HYSTERECTOMY      TONSILLECTOMY         Time Tracking:     OT Date of Treatment: 10/21/20  OT Start Time: 1122  OT Stop Time: 1142  OT Total Time (min): 20 min    Billable Minutes:Evaluation High Complexity 20 minutes    Golden Bartlett OT  10/21/2020

## 2020-10-21 NOTE — HOSPITAL COURSE
10/23 Here for skilled ; needing IV abx for spinal abscess;  vanc 1750mg q 24 till 12/3  Afebrile , 3LNC - O2 sat 95%   + debility, very deconditioned; bilt LE x 10 reps followed by bed mobility trng and static sit balance and tolerance at side of the bed  C/o back pain with activity; Occupational therapist notes that she is very resistant to movement and c/o severe pain with minimal activity.   Will order toradol 15mg x 1dose IV; pt did much better after toradol rx today per OT. Will order daily prior to OT as tolerated  Monitor renal fx     10/26 here for skilled therapy and cont IV abc. Vanc 1750mg iv every 12hr. Noted elevated WBC this am and she report that she has had diarrhea for the last 4 days.   · PT reports Supine to Sit: maximal assistance, of 2 persons and patient able to reach with UE to push through rail and with improved push off with UE and initiation  · Sit to Supine: maximal assistance of 2 people with improved ability of patient to control descent of upper body  · Transfers:     · Sit to Stand:  moderate assistance, maximal assistance of 2 persons with no AD.  PT and PT tech blocking both knees while assisting pt to elevate hips from bed  Balance: pt able to stand x15 seconds EOB limited largely by knee and back pain.       10/28  She is still on vanc IV daily. No longer with diarrhea. Did have heme positive stools H/H stable on lovenox for DVT prophylaxis and plavix. Will monitor h/h M/Thurs. No fever. No elevated WBC  pt is really immobile.  · Bed Mobility:     · Rolling Left:  moderate assistance  · Rolling Right: moderate assistance  · Scooting: maximal assistance  · Supine to Sit: maximum/moderate assistace and cues  · Sit to Supine: maximum assistace x 1-2 and cues  · Transfers:     · Sit to Stand:  maximum assistance and cues inside the parallel bars with facilitation tech  · Bed to Chair: to wheelchair  with  maximum assistance and cues  using  Slide Board  · Balance: Standing Static  inside the parallel bars with Poor grade. Tolerated for ~10 seconds with 2 attempts with  Maximum assistance and cues.  Cont PT daily  10/30  IV  vanc 1,000mg q 24hr x 8 weeks total till 12/3 per ID for discitis; random vanc 12.5 yesterday    No longer with diarrhea. Did have heme positive stools H/H stable on lovenox for DVT prophylaxis and plavix. H&H stable, lovenox stopped. No fever. No elevated WBC. She has productive cough this am. K+ 3.2 yesterday, getting lasix, will repeat KCL 40meq today   pt is really immobile.Standing with RW Static: Poor for ~15 seconds tolerance with max Assistance and cues  Has PICC line- one port clotted;       11/2 she remains on vanc 1000mg  q 24hr x 8 weeks total till 12/3 per ID for discitis. Vanc trough 15.8 10/31/20. Vss/afebrile. intermittent back pain with prn pain meds helping. She is not doing much with PT. She uses [facility] round at home but can transfer independently. Here she is max assist     11/4 Remains on vanc 1000mg  q 24hr x 8 weeks total till 12/3 per ID for discitis. Vanc trough 16.6 on 11/2   Afebrile , having regular BMs; getting Norco 10mg q 6   · Continues to require max assist; Sit to Stand:  Max/moderate assistance and cues with standard walker  Balance: Static Stand with Std Walker: Fair- with 2 mins and 7 seconds  tolerance and max/moderate assistance and cues  11/4 Remains on vanc 1000mg  q 24hr x 8 weeks total till 12/3 per ID for discitis. Vanc trough 16.6 on 11/2   Afebrile, WBC nml, creat stable , K+3.4  Requires maximal assist but finally standing with PT with walker .    11/9/20    Remains on vanc 1000mg  q 24hr x 8 weeks total till 12/3 per ID for discitis. VSS/afebrile. No elevated WBC. Last vanc trough 16.3 11/7  She is progressing with PT now min assist from sit to supine and mod assist sit to stand with RW,    11/11 remains on vanc 750mg IV every 24hr, dose monitored by pharm. Last vanc trough 16.5 11/9. Repeat MRI done Monday. Contact with  "neurosurgery sent to discuss comparison with original johny since she is mid way on IV abx and had no intervention. She has no comoplaints. VSS/labs reviewed. She is standing at bedside 1min and 15 sec with mod assist    11/13 remains on vanc 750mg IV every 24hr, dose monitored by pharm. Last vanc trough 14.8  11/11. Repeat MRI done Monday; mid tx as per ID recommendations;  messaged ID, shows severe Degenerative disease of spine   Able to stand now for over 1m with PT     11/16 remains on vanc every 24hr 750mg. Last vanc trough was 15.9 yesterday. Afebrile, no elevated WBC. She reports that she is feeling well. Getting stronger. Working with PT. Min assist with bed mobility and mod with standing.     11/18/20 planned for OP visit with ID this am   Remains on vanc every 24hr 750mg. Last vanc trough was 16.8  yesterday. Afebrile, no elevated WBC    11/20/20 Remains on vanc 1000mg  q 24hr x 8 weeks total till 12/3 per ID for discitis. VSS/afebrile. No elevated WBC. Last vanc trough 19.2 yesterday ;  Went to appnt with ID 11/18; she reports they told her she still has "three pockets of infection"; however, the note is incomplete and does not provide further recommendations;message sent in Epic this am.   11/20 pt was able stand up and transfer  to Bedside commode for the first time today, also had a BM   ESR/CRP pending otherwise labs stable    11/23/20 pt remains on Vanc 750mg IV every 24hr till 12/3. ID saw patient last week and recommends continued IV abx through the [employer]. Neuro surgery has been set up for virtual visit this am. crp improved 139>16 sed rate 44. Labs and VSS. She is min to contact assist with getting up. She is not ambulating though. Case management working with patient and daughter about her d/c plan.     11/25/20 Remains on vanc 1000mg  q 24hr x 8 weeks total till 12/3 per ID for discitisID saw patient last week and recommends continued IV abx through the coarse. Vanc trough 16.3 yesteray; Had " virtual visit with neurology yesterday   Standing with Std Walker Static: Fair- and tolerated for 1 min and 15 seconds(1st attempt) ; 45 seconds (2nd attempt)  Pt reports on virtual visit that she will need repeat MRI next week. Still waiting these notes and recs     11/27 vanc 1000mg  q 24hr x 8 weeks total till 12/3 per ID for discitisID saw patient last week and recommends continued IV abx through the coarse. Vanc trough 16.2 yesteray; Renal fx stable    Had virtual visit with neurology; plan CT thoracic spine for further evaluation of bone quality; did not document timing of CT?   - Follow-up with Dr. Monteiro within the next 2 weeks for further discussion, may be virtual visit  - Continue Vancomycin through 12/3 per ID recs    11/30 she is doing very well. This am she has OT at bedside. She was able to get from bed to bedside commode with stand by assist!!! She remains on vanc 1000mg IV every 24hr. Last vanc trough was 10.1 yesterday. No fever. VSS.     12/1-  Dr. Monteiro's TOYA just call me tell me that the CT scan of the thoracic spine shows several new fractures and she now has an unstable thoracic spine issue that will need to be corrected.  They recommend patient be transferred back to [facility].  Discussed the situation with the patient and she agrees to go

## 2020-10-21 NOTE — PLAN OF CARE
Plan of Care discussed with patient on admit and during shift when awake.  Requires re-enforcement.  No falls this shift.  Afebrile.  Did not request pain medication.  Accepted when offered.  Bilateral feet with boots elevated off bed.   Oxygen per nasal cannula at 3 Liter.  SATs in 90s.  Island dressing to back intact without drainage.  Incontinent of urine and stool.  Purewick in place.

## 2020-10-21 NOTE — CONSULTS
"  [facility]  Adult Nutrition  Consult Note    SUMMARY     Recommendations    Recommendation:   1. continue cardiac diet   2. encourage pt to increase intake of meals   3. Please order Boost Pudding TID for intake support    Goals: consume at least 50% of meals by f/u  Nutrition Goal Status: new  Communication of RD Recs: (POC, second sign)    Reason for Assessment    Reason For Assessment: consult  Diagnosis: (Debility)  Relevant Medical History: HTN, CAD, obesity, HLD, myopathy    General Information Comments: Pt admitted for SWING. She consumed 25-50% of meals yesterday and 0% so far today (very little bites). Per chart review, pt with a 42lb (12.5%) significant wt loss in 3 months , and Pt states she has lost 60lbs since june. NFPE performed today, mild wasting found in temples and orbital region; pt meets criteria for moderate malnutrition.    Nutrition Discharge Planning: Low sodium diet    Nutrition Risk Screen    Nutrition Risk Screen: no indicators present    Nutrition/Diet History    Spiritual, Cultural Beliefs, Scientologist Practices, Values that Affect Care: no  Food Allergies: NKFA    Anthropometrics    Temp: 98.7 °F (37.1 °C)  Height Method: Stated  Height: 5' 10" (177.8 cm)  Height (inches): 70 in  Weight Method: Bed Scale  Weight: 133 kg (293 lb 3.4 oz)  Weight (lb): 293.21 lb  Ideal Body Weight (IBW), Female: 150 lb  % Ideal Body Weight, Female (lb): 195.47 %  BMI (Calculated): 42.1  BMI Grade: greater than 40 - morbid obesity  Usual Body Weight (UBW), kg: (!) 156.8 kg  % Usual Body Weight: 85     Lab/Procedures/Meds    Pertinent Labs Reviewed: reviewed  Pertinent Labs Comments: albumin 2.3, AST 8, ALT 7  Pertinent Medications Reviewed: reviewed  Pertinent Medications Comments: ferrous sulfate, furosemide, metoprolol succinate, pantoprazole, pravastatin, abx    Estimated/Assessed Needs    Weight Used For Calorie Calculations: 133 kg (293 lb 3.4 oz)  Energy Calorie Requirements " (kcal): 1920  Energy Need Method: Real-St Jeor(no AF)  Protein Requirements: 106-133 g/d  Weight Used For Protein Calculations: 133 kg (293 lb 3.4 oz)     Estimated Fluid Requirement Method: RDA Method(or per MD)  RDA Method (mL): 1920     Nutrition Prescription Ordered    Current Diet Order: Cardiac diet    Evaluation of Received Nutrient/Fluid Intake    Fluid Required: meeting needs  Comments:  LBM 10/20  % Intake of Estimated Energy Needs: 0 - 25 %  % Meal Intake: 0 - 25 %    Nutrition Risk    Level of Risk/Frequency of Follow-up: low - moderate     Assessment and Plan    Malnutrition of mild degree  Nutrition Problem:  (Moderate) Protein-Calorie Malnutrition  Malnutrition in the context of Chronic Illness/Injury    Related to (etiology):  Inadequate oral intake    Signs and Symptoms (as evidenced by):  Energy Intake: <75% of estimated energy requirement for >/= 1 month  Body Fat Depletion: mild depletion of orbitals   Muscle Mass Depletion: mild depletion of temples   Weight Loss: 12.5%% x 3 months    Interventions(treatment strategy):  Sodium modified diet  Collaboration with other providers  [employer] food- Boost Pudding    Nutrition Diagnosis Status:  New        Monitor and Evaluation    Food and Nutrient Intake: energy intake, food and beverage intake  Food and Nutrient Adminstration: diet order  Physical Activity and Function: nutrition-related ADLs and IADLs  Anthropometric Measurements: weight change, body mass index  Biochemical Data, Medical Tests and Procedures: other (specify)  Nutrition-Focused Physical Findings: overall appearance     Malnutrition Assessment  Malnutrition Type: chronic illness          Weight Loss (Malnutrition): greater than 7.5% in 3 months  Energy Intake (Malnutrition): less than 75% for greater than or equal to 1 month   Orbital Region (Subcutaneous Fat Loss): mild depletion  Upper Arm Region (Subcutaneous Fat Loss): well nourished  Thoracic and Lumbar Region: well nourished    Sikhism Region (Muscle Loss): mild depletion  Clavicle Bone Region (Muscle Loss): well nourished  Clavicle and Acromion Bone Region (Muscle Loss): well nourished  Scapular Bone Region (Muscle Loss): well nourished  Dorsal Hand (Muscle Loss): well nourished  Patellar Region (Muscle Loss): well nourished  Anterior Thigh Region (Muscle Loss): well nourished  Posterior Calf Region (Muscle Loss): well nourished               Nutrition Follow-Up    RD Follow-up?: Yes

## 2020-10-21 NOTE — PLAN OF CARE
Recommendation:   1. continue cardiac diet   2. encourage pt to increase intake of meals   3. Please order Boost Pudding TID for intake support    Interventions(treatment strategy):  Sodium modified diet  Collaboration with other providers  Commercial food- Boost Pudding    Goals: consume at least 50% of meals by f/u  Nutrition Goal Status: new    Nutrition Discharge Planning: Low sodium diet

## 2020-10-21 NOTE — PLAN OF CARE
Goals to be met by: 11/10/20    Patient will increase functional independence with mobility by performin. Rolling to sides using bed rail with contact guard assistance and cues  2. Supine to sit with minimal assistance and cues  3. Sit to supine with moderate assistance and cues  4. Tolerate Static Sit at side of the bed for 10 minutes with Standby Assistance  5. Bed to chair transfer with moderate assistance and cues using Slide Board TECHNIQUE  6. Lower extremity exercise program x15 reps per handout, with assistance as needed

## 2020-10-21 NOTE — PT/OT/SLP EVAL
"Physical Therapy Evaluation    Patient Name:  Martina Betancourt   MRN:  [id number]    Recommendations:     Discharge Recommendations:  nursing facility, basic, home with home health   Discharge Equipment Recommendations: lift device, hospital bed   Barriers to discharge: Decreased caregiver support    Assessment:     Martina Betancourt is a 70 y.o. female admitted with a medical diagnosis of Debility.  She presents with the following impairments/functional limitations:  weakness, impaired endurance, impaired functional mobilty, impaired self care skills, gait instability, impaired balance, pain, decreased lower extremity function, decreased upper extremity function, impaired skin(Right posterior heel presure sore healing covered with scab). Obese patient with requires extensive assistance on bed mobility roilling to sides using bed rail but limited tolerance due to generalized body pain and weakness. Patient refused to try  to sit up on side of the bed today. Patient will benefit from Skilled PT tx to inc safety, inc level of mobility, inc endurance with functional activity and dec burden of care.    Rehab Prognosis: Fair; patient would benefit from acute skilled PT services to address these deficits and reach maximum level of function.    Recent Surgery: * No surgery found *      Plan:     During this hospitalization, patient to be seen daily to address the identified rehab impairments via therapeutic activities, therapeutic exercises, wheelchair management/training and progress toward the following goals:    · Plan of Care Expires:  11/10/20    Subjective     Chief Complaint: requires inc assistance with mobility; debility  Patient/Family Comments/goals: "To control body pain, inc mobility and to return home with family".  Pain/Comfort:  · Pain Rating 1: 10/10  · Location - Orientation 1: generalized  · Location 1: back(Right Foot; Knees; Right Shoulder due to Rotator cuff; Right and Left torso(ribs) areas.)  · Pain " Addressed 1: Pre-medicate for activity, Reposition, Cessation of Activity  · Pain Rating Post-Intervention 1: 8/10    Patients cultural, spiritual, Anabaptism conflicts given the current situation: no    Living Environment:  Patient lives with daughter and granddaughter in a [facility] with no RISHABH.  Prior to admission, patients level of function was performing slide board transfers bed<>w/c/scooter using sliding board with family assistance. Used hoveround scooter for ambulation activity  Equipment used at home: bedside commode, grab bar, CPAP, oxygen, wheelchair, power chair, slide board(hoveround scooter).  DME owned (not currently used): none.  Upon discharge, patient will have assistance from daughter and family.    Objective:     Communicated with patient  prior to session.  Patient found supine with oxygen, peripheral IV, PureWick, pressure relief boots  upon PT entry to room.    General Precautions: Standard, fall   Orthopedic Precautions:N/A   Braces: N/A     Exams:  · Cognitive Exam:  Patient is oriented to Person, Place and Time  · Gross Motor Coordination:  Decreased bilat LE due to weakness  · Sensation:    · -       Intact  · Skin Integrity/Edema:      · -       Skin integrity: Visible skin intact and noted healing pressure sore covered with scab on R posterior heel area  · RLE ROM: WFL active assistedly  done  · RLE Strength: 2/5  · LLE ROM: WFL active assistedly  done  · LLE Strength: 2/5    Functional Mobility:  · Bed Mobility:     · Rolling Left:  dependence  · Rolling Right: dependence  · Scooting: unable  · Supine to Sit: unable  · Transfers:     · Bed to Chair: unable with  appropriate AD  using  Slide Board  · Gait: unable  · Balance: Sitting at side of the bed: Unable    Therapeutic Activities and Exercises:   Completed PT eval. Educated patient the role of PT. Initiated bed mobility with safety measures.    AM-PAC 6 CLICK MOBILITY  Total Score:7     Patient left supine with all lines intact, call  button in reach, bed alarm on and nursing notified.    GOALS:   Multidisciplinary Problems     Physical Therapy Goals        Problem: Physical Therapy Goal    Goal Priority Disciplines Outcome Goal Variances Interventions   Physical Therapy Goal     PT, PT/OT      Description: Goals to be met by: 11/10/20    Patient will increase functional independence with mobility by performin. Rolling to sides using bed rail with contact guard assistance and cues  2. Supine to sit with minimal assistance and cues  3. Sit to supine with moderate assistance and cues  4. Tolerate Static Sit at side of the bed for 10 minutes with Standby Assistance  5. Bed to chair transfer with moderate assistance and cues using Slide Board TECHNIQUE  6. Lower extremity exercise program x15 reps per handout, with assistance as needed                      History:     Past Medical History:   Diagnosis Date    Alzheimer disease     Alzheimer disease     Coronary artery disease     Hyperlipidemia     Hypertension     Myopathy     Non-ST elevation (NSTEMI) myocardial infarction     Obesity     Pneumonia     Sleep apnea        Past Surgical History:   Procedure Laterality Date    APPENDECTOMY      APPENDECTOMY      CHOLECYSTECTOMY      CORONARY STENT PLACEMENT      HYSTERECTOMY      TONSILLECTOMY         Time Tracking:     PT Received On: 10/21/20  PT Start Time: 1325     PT Stop Time: 1345  PT Total Time (min): 20 min     Billable Minutes: Evaluation 20      Edgar Lamas, PT  10/21/2020

## 2020-10-22 LAB
ANION GAP SERPL CALC-SCNC: 8 MMOL/L (ref 8–16)
BASOPHILS # BLD AUTO: 0.03 K/UL (ref 0–0.2)
BASOPHILS NFR BLD: 0.4 % (ref 0–1.9)
BUN SERPL-MCNC: 10 MG/DL (ref 8–23)
CALCIUM SERPL-MCNC: 10.1 MG/DL (ref 8.7–10.5)
CHLORIDE SERPL-SCNC: 99 MMOL/L (ref 95–110)
CO2 SERPL-SCNC: 34 MMOL/L (ref 23–29)
CREAT SERPL-MCNC: 0.9 MG/DL (ref 0.5–1.4)
DIFFERENTIAL METHOD: ABNORMAL
EOSINOPHIL # BLD AUTO: 0.2 K/UL (ref 0–0.5)
EOSINOPHIL NFR BLD: 2.6 % (ref 0–8)
ERYTHROCYTE [DISTWIDTH] IN BLOOD BY AUTOMATED COUNT: 15.9 % (ref 11.5–14.5)
EST. GFR  (AFRICAN AMERICAN): >60 ML/MIN/1.73 M^2
EST. GFR  (NON AFRICAN AMERICAN): >60 ML/MIN/1.73 M^2
FINAL PATHOLOGIC DIAGNOSIS: NORMAL
GLUCOSE SERPL-MCNC: 94 MG/DL (ref 70–110)
GROSS: NORMAL
HCT VFR BLD AUTO: 37.2 % (ref 37–48.5)
HGB BLD-MCNC: 10.9 G/DL (ref 12–16)
IMM GRANULOCYTES # BLD AUTO: 0.04 K/UL (ref 0–0.04)
IMM GRANULOCYTES NFR BLD AUTO: 0.5 % (ref 0–0.5)
LYMPHOCYTES # BLD AUTO: 1.8 K/UL (ref 1–4.8)
LYMPHOCYTES NFR BLD: 22.1 % (ref 18–48)
MAGNESIUM SERPL-MCNC: 2.1 MG/DL (ref 1.6–2.6)
MCH RBC QN AUTO: 26.7 PG (ref 27–31)
MCHC RBC AUTO-ENTMCNC: 29.3 G/DL (ref 32–36)
MCV RBC AUTO: 91 FL (ref 82–98)
MONOCYTES # BLD AUTO: 1.1 K/UL (ref 0.3–1)
MONOCYTES NFR BLD: 13.7 % (ref 4–15)
NEUTROPHILS # BLD AUTO: 5 K/UL (ref 1.8–7.7)
NEUTROPHILS NFR BLD: 60.7 % (ref 38–73)
NRBC BLD-RTO: 0 /100 WBC
PHOSPHATE SERPL-MCNC: 3.6 MG/DL (ref 2.7–4.5)
PLATELET # BLD AUTO: 335 K/UL (ref 150–350)
PMV BLD AUTO: 9.9 FL (ref 9.2–12.9)
POCT GLUCOSE: 98 MG/DL (ref 70–110)
POTASSIUM SERPL-SCNC: 3.5 MMOL/L (ref 3.5–5.1)
RBC # BLD AUTO: 4.08 M/UL (ref 4–5.4)
SODIUM SERPL-SCNC: 141 MMOL/L (ref 136–145)
WBC # BLD AUTO: 8.18 K/UL (ref 3.9–12.7)

## 2020-10-22 PROCEDURE — 63600175 PHARM REV CODE 636 W HCPCS: Performed by: INTERNAL MEDICINE

## 2020-10-22 PROCEDURE — 25000003 PHARM REV CODE 250: Performed by: INTERNAL MEDICINE

## 2020-10-22 PROCEDURE — 85025 COMPLETE CBC W/AUTO DIFF WBC: CPT

## 2020-10-22 PROCEDURE — 97530 THERAPEUTIC ACTIVITIES: CPT

## 2020-10-22 PROCEDURE — A4216 STERILE WATER/SALINE, 10 ML: HCPCS | Performed by: INTERNAL MEDICINE

## 2020-10-22 PROCEDURE — 11000004 HC SNF PRIVATE

## 2020-10-22 PROCEDURE — 83735 ASSAY OF MAGNESIUM: CPT

## 2020-10-22 PROCEDURE — 27000221 HC OXYGEN, UP TO 24 HOURS

## 2020-10-22 PROCEDURE — 25000003 PHARM REV CODE 250: Performed by: NURSE PRACTITIONER

## 2020-10-22 PROCEDURE — 94761 N-INVAS EAR/PLS OXIMETRY MLT: CPT

## 2020-10-22 PROCEDURE — 99900035 HC TECH TIME PER 15 MIN (STAT)

## 2020-10-22 PROCEDURE — 84100 ASSAY OF PHOSPHORUS: CPT

## 2020-10-22 PROCEDURE — 80048 BASIC METABOLIC PNL TOTAL CA: CPT

## 2020-10-22 PROCEDURE — 97110 THERAPEUTIC EXERCISES: CPT

## 2020-10-22 RX ADMIN — Medication 10 ML: at 12:10

## 2020-10-22 RX ADMIN — LISINOPRIL 10 MG: 10 TABLET ORAL at 08:10

## 2020-10-22 RX ADMIN — FERROUS SULFATE TAB 325 MG (65 MG ELEMENTAL FE) 325 MG: 325 (65 FE) TAB at 10:10

## 2020-10-22 RX ADMIN — HYDROCODONE BITARTRATE AND ACETAMINOPHEN 1 TABLET: 10; 325 TABLET ORAL at 05:10

## 2020-10-22 RX ADMIN — ISOSORBIDE MONONITRATE 60 MG: 60 TABLET, EXTENDED RELEASE ORAL at 08:10

## 2020-10-22 RX ADMIN — METHOCARBAMOL TABLETS 500 MG: 500 TABLET, COATED ORAL at 08:10

## 2020-10-22 RX ADMIN — METOPROLOL SUCCINATE 50 MG: 50 TABLET, EXTENDED RELEASE ORAL at 08:10

## 2020-10-22 RX ADMIN — METHOCARBAMOL TABLETS 500 MG: 500 TABLET, COATED ORAL at 10:10

## 2020-10-22 RX ADMIN — ENOXAPARIN SODIUM 40 MG: 40 INJECTION SUBCUTANEOUS at 10:10

## 2020-10-22 RX ADMIN — DONEPEZIL HYDROCHLORIDE 10 MG: 5 TABLET, FILM COATED ORAL at 10:10

## 2020-10-22 RX ADMIN — DULOXETINE 60 MG: 30 CAPSULE, DELAYED RELEASE ORAL at 08:10

## 2020-10-22 RX ADMIN — MICONAZOLE NITRATE: 20 OINTMENT TOPICAL at 10:10

## 2020-10-22 RX ADMIN — MUPIROCIN: 20 OINTMENT TOPICAL at 10:10

## 2020-10-22 RX ADMIN — PANTOPRAZOLE SODIUM 40 MG: 40 TABLET, DELAYED RELEASE ORAL at 06:10

## 2020-10-22 RX ADMIN — Medication 10 ML: at 10:10

## 2020-10-22 RX ADMIN — Medication 10 ML: at 06:10

## 2020-10-22 RX ADMIN — BISACODYL 5 MG: 5 TABLET, COATED ORAL at 08:10

## 2020-10-22 RX ADMIN — HYDROCODONE BITARTRATE AND ACETAMINOPHEN 1 TABLET: 10; 325 TABLET ORAL at 10:10

## 2020-10-22 RX ADMIN — CLOPIDOGREL 75 MG: 75 TABLET, FILM COATED ORAL at 08:10

## 2020-10-22 RX ADMIN — ASPIRIN 81 MG: 81 TABLET, COATED ORAL at 08:10

## 2020-10-22 RX ADMIN — FUROSEMIDE 40 MG: 40 TABLET ORAL at 08:10

## 2020-10-22 RX ADMIN — Medication 1750 MG: at 10:10

## 2020-10-22 RX ADMIN — MEMANTINE 10 MG: 10 TABLET ORAL at 10:10

## 2020-10-22 RX ADMIN — MEMANTINE 10 MG: 10 TABLET ORAL at 08:10

## 2020-10-22 RX ADMIN — METHOCARBAMOL TABLETS 500 MG: 500 TABLET, COATED ORAL at 01:10

## 2020-10-22 RX ADMIN — PRAVASTATIN SODIUM 40 MG: 40 TABLET ORAL at 10:10

## 2020-10-22 RX ADMIN — ENOXAPARIN SODIUM 40 MG: 40 INJECTION SUBCUTANEOUS at 08:10

## 2020-10-22 RX ADMIN — MELATONIN TAB 3 MG 6 MG: 3 TAB at 10:10

## 2020-10-22 RX ADMIN — METHOCARBAMOL TABLETS 500 MG: 500 TABLET, COATED ORAL at 05:10

## 2020-10-22 RX ADMIN — MUPIROCIN: 20 OINTMENT TOPICAL at 08:10

## 2020-10-22 RX ADMIN — MICONAZOLE NITRATE: 20 OINTMENT TOPICAL at 08:10

## 2020-10-22 RX ADMIN — HYDROCODONE BITARTRATE AND ACETAMINOPHEN 1 TABLET: 10; 325 TABLET ORAL at 08:10

## 2020-10-22 RX ADMIN — ACETAMINOPHEN 500 MG: 500 TABLET, FILM COATED ORAL at 08:10

## 2020-10-22 RX ADMIN — ACETAMINOPHEN 500 MG: 500 TABLET, FILM COATED ORAL at 10:10

## 2020-10-22 NOTE — PLAN OF CARE
Patient Agrees and Compliant with Plan of Care:  Monitor Vital Signs; Patient Afebrile Vital signs stable.  Maintain Pain Regimen; No c/o pain noted this shift.  Monitor Breathing Pattern, Bilateral Breath sounds auscultated posteriorly and diminished throughout. Oxygen saturation 95-96% on oxygen at 3L nc. Note patient does c/o shortness of breath upon minimal exertion.  Monitor Wounds; Dressing to upper back D/I; Note pressure sore to left heel without drainage; Open to air; area around red; Heels up off bed.Patient repositioned for comfort.  Administer IV Antibiotics as ordered; Vancomycin 1750gms given this shift.  Monitor Vancomycin trough; Vanc trough this shift was 19.4.   Maintain I/O's; Patient has a Purwick external cath. ; note patient moves cath out of place at times; Incontinent X 2 this shift.I/O's documented.  Fall Precautions; Maintain Patient Safety; No falls or injury noted this shift.

## 2020-10-22 NOTE — CARE UPDATE
Spoke with rAiela VASQUES who will see patient while on SWING. She looked over x rays and hx and reports that patient can weight bear as tolerated. Nurse/OT/MD notified

## 2020-10-22 NOTE — PT/OT/SLP PROGRESS
Physical Therapy Treatment    Patient Name:  Martina Bteancourt   MRN:  [id number]    Recommendations:     Discharge Recommendations:  nursing facility, basic   Discharge Equipment Recommendations: lift device   Barriers to discharge: Decreased caregiver support    Assessment:     Martina Betancourt is a 70 y.o. female admitted with a medical diagnosis of Debility.  She presents with the following impairments/functional limitations:  weakness, gait instability, impaired cardiopulmonary response to activity, decreased lower extremity function, decreased upper extremity function, decreased ROM, impaired endurance, impaired balance, impaired functional mobilty, impaired self care skills, pain, decreased safety awareness, impaired skin. Patient remains to complain pain at lower back and knees upon movement. Able to sit patient up at side of the bed with dependent A and cues  Due to back pain. Limited sitting tolerance at side of the bed for ~4 minutes with Standby assistance.     Rehab Prognosis: Fair; patient would benefit from acute skilled PT services to address these deficits and reach maximum level of function.    Recent Surgery: * No surgery found *      Plan:     During this hospitalization, patient to be seen daily to address the identified rehab impairments via therapeutic activities, therapeutic exercises, wheelchair management/training and progress toward the following goals:    · Plan of Care Expires:  11/10/20    Subjective     Chief Complaint: lower back pain, knees pain upon movement  Patient/Family Comments/goals: To increase mobility and dec burden of care.  Pain/Comfort:  · Pain Rating 1: 8/10  · Location - Side 1: Bilateral  · Location - Orientation 1: lower  · Location 1: back  · Pain Addressed 1: Reposition, Distraction  · Pain Rating Post-Intervention 1: 7/10  · Pain Rating 2: 8/10  · Location - Side 2: Bilateral  · Location - Orientation 2: lower  · Location 2: knee  · Pain Addressed 2: Reposition,  Distraction  · Pain Rating Post-Intervention 2: 6/10      Objective:     Communicated with patient  prior to session.  Patient found HOB elevated with PureWick, oxygen, peripheral IV, pressure relief boots upon PT entry to room.     General Precautions: Standard, fall   Orthopedic Precautions:Full weight bearing(Discussed with nursing (Miya) today 10/22/20 and clarified with LAYO Martinez after checked X-Ray. Patient can apply full weight  bearing on Right LE/ankle.)   Braces: N/A     Functional Mobility:  · Bed Mobility:     · Rolling Left:  maximum assistance and cues using bedrail  · Rolling Right: maximum assistance and cues using bedrail  · Supine to Sit: dependence assistance and cues  · Sit to Supine: dependence assistance and cues  · Balance: Static Sit: Fair- and tolearted ~4 minutes with standby assistance      AM-PAC 6 CLICK MOBILITY  Turning over in bed (including adjusting bedclothes, sheets and blankets)?: 2  Sitting down on and standing up from a chair with arms (e.g., wheelchair, bedside commode, etc.): 1  Moving from lying on back to sitting on the side of the bed?: 2  Moving to and from a bed to a chair (including a wheelchair)?: 1  Need to walk in hospital room?: 1  Climbing 3-5 steps with a railing?: 1  Basic Mobility Total Score: 8       Therapeutic Activities and Exercises:   Provided AAROM ex on bilt LE x 10 reps followed by bed mobility trng and static sit balance and tolerance at side of the bed.    Patient left HOB elevated with all lines intact, call button in reach, bed alarm on and nursing notified..    GOALS:   Multidisciplinary Problems     Physical Therapy Goals        Problem: Physical Therapy Goal    Goal Priority Disciplines Outcome Goal Variances Interventions   Physical Therapy Goal     PT, PT/OT Ongoing, Progressing     Description: Goals to be met by: 11/10/20    Patient will increase functional independence with mobility by performin. Rolling to sides using bed  rail with contact guard assistance and cues  2. Supine to sit with minimal assistance and cues  3. Sit to supine with moderate assistance and cues  4. Tolerate Static Sit at side of the bed for 10 minutes with Standby Assistance  5. Bed to chair transfer with moderate assistance and cues using Slide Board TECHNIQUE  6. Lower extremity exercise program x15 reps per handout, with assistance as needed                      Time Tracking:     PT Received On: 10/22/20  PT Start Time: 1410     PT Stop Time: 1430  PT Total Time (min): 20 min     Billable Minutes: Therapeutic Activity 20    Treatment Type: Treatment  PT/PTA: PT           Edgar Lamas, PT  10/22/2020

## 2020-10-22 NOTE — PROGRESS NOTES
Pharmacokinetic Assessment Follow Up: IV Vancomycin    Vancomycin serum concentration assessment(s):    The trough level was drawn correctly and can be used to guide therapy at this time. The measurement is within the desired definitive target range of 15 to 20 mcg/mL.    Vancomycin Regimen Plan:    Continue regimen to Vancomycin 1750 mg IV every 24 hours with next serum trough concentration measured at 10/23/20 prior to 3 dose on 2200    Drug levels (last 3 results):  Recent Labs   Lab Result Units 10/19/20  1545 10/21/20  2206   Vancomycin-Trough ug/mL 20.5 19.4       Pharmacy will continue to follow and monitor vancomycin.    Please contact pharmacy at extension [phone] for questions regarding this assessment.    Thank you for the consult,   Jailene Pace       Patient brief summary:  Martina Betancourt is a 70 y.o. female initiated on antimicrobial therapy with IV Vancomycin for treatment of bacteremia    The patient's current regimen is 1750mg q12    Drug Allergies:   Review of patient's allergies indicates:   Allergen Reactions    Hydroxyzine hcl     Tizanidine        Actual Body Weight:   94kg    Renal Function:   Estimated Creatinine Clearance: 94.6 mL/min (based on SCr of 0.8 mg/dL).,     Dialysis Method (if applicable):  N/A    CBC (last 72 hours):  Recent Labs   Lab Result Units 10/19/20  0252 10/20/20  0359   WBC K/uL 8.51 9.74   Hemoglobin g/dL 10.2* 10.9*   Hematocrit % 35.5* 38.8   Platelets K/uL 332 343   Gran% % 61.2 60.9   Lymph% % 20.9 23.3   Mono% % 15.6* 13.0   Eosinophil% % 1.5 2.1   Basophil% % 0.4 0.4   Differential Method  Automated Automated       Metabolic Panel (last 72 hours):  Recent Labs   Lab Result Units 10/19/20  0252 10/20/20  0359   Sodium mmol/L 140 142   Potassium mmol/L 3.2* 3.9   Chloride mmol/L 97 97   CO2 mmol/L 36* 34*   Glucose mg/dL 134* 80   BUN, Bld mg/dL 8 8   Creatinine mg/dL 0.8 0.8   Albumin g/dL 2.2* 2.3*   Total Bilirubin mg/dL 0.1 0.3   Alkaline Phosphatase U/L 99  102   AST U/L 9* 8*   ALT U/L 5* 7*       Vancomycin Administrations:  vancomycin given in the last 96 hours                     vancomycin (VANCOCIN) 1,000 mg injection (mg) 1,750 mg Given 10/21/20 0015    vancomycin 1750 mg in 0.9% sodium chloride 500 mL IVPB (mg) 1,750 mg New Bag 10/20/20 2300    vancomycin 1.75 g in 5 % dextrose 500 mL IVPB (mg) 1,750 mg New Bag 10/20/20 1733     1,750 mg New Bag 10/19/20 1829     1,750 mg New Bag 10/18/20 1628                    Microbiologic Results:  Microbiology Results (last 7 days)       ** No results found for the last 168 hours. **

## 2020-10-22 NOTE — PT/OT/SLP PROGRESS
Occupational Therapy   Treatment    Name: Martina Betancourt  MRN: [id number]  Admitting Diagnosis:  Debility       Recommendations:     Discharge Recommendations: nursing facility, basic  Discharge Equipment Recommendations:  lift device  Barriers to discharge:       Assessment:     Martina Betancourt is a 70 y.o. female with a medical diagnosis of Debility.  She presents with decreased endurance during this session today. Performance deficits affecting function are weakness, gait instability, impaired cardiopulmonary response to activity, decreased lower extremity function, decreased upper extremity function, decreased ROM, impaired endurance, impaired balance, impaired functional mobilty, impaired self care skills, pain, decreased safety awareness, impaired skin.     Rehab Prognosis:  Fair; patient would benefit from acute skilled OT services to address these deficits and reach maximum level of function.       Plan:     Patient to be seen 5 x/week to address the above listed problems via self-care/home management, therapeutic activities, therapeutic exercises  · Plan of Care Expires: 11/04/20  · Plan of Care Reviewed with: patient    Subjective     Pain/Comfort:  · Pain Rating 1: 7/10  · Location - Side 1: Bilateral  · Location 1: knee  · Pain Addressed 1: Reposition, Distraction, Nurse notified    Objective:     Communicated with: nursing and patient prior to session.  Patient found supine with PureWick, oxygen, peripheral IV upon OT entry to room.    General Precautions: Standard, fall   Orthopedic Precautions:Full weight bearing   Braces: N/A     Occupational Performance:     Bed Mobility:    · Not completed    Functional Mobility/Transfers:  · Not completed    Activities of Daily Living:  · Pt required set up with iced drink and with lifting drink to mouth.      [facility] 6 Click ADL: 11    Treatment & Education:  Pt found supine in bed upon OT arrival. Pt initially agreeable in attempting to sit up in bed but when  attempting to roll, patient started to complain of pain and no longer wanted to continue. Pt agreeable to completing therex in bed. Pt completed 3x10 shoulder flexion but required assist with R arm in completing. Pt completed 3 x10 biceps curls. Pt tolerated treatment session well.     Patient left supine with all lines intactEducation:      GOALS:   Multidisciplinary Problems     Occupational Therapy Goals        Problem: Occupational Therapy Goal    Goal Priority Disciplines Outcome Interventions   Occupational Therapy Goal     OT, PT/OT Ongoing, Progressing    Description: Goals to be met by: 11/4/2020     Patient will increase functional independence with ADLs by performing:    Feeding with Supervision.  UE Dressing with Supervision.  LE Dressing with Moderate Assistance.  Grooming while seated with Supervision.  Toileting from bedside commode with Minimal Assistance for hygiene and clothing management.   Sitting at edge of bed x5 minutes with Supervision.  Rolling to Bilateral with Supervision.   Supine to sit with Minimal Assistance.  Squat pivot transfers with Minimal Assistance.  Toilet transfer to bedside commode with Minimal Assistance.  Upper extremity exercise program x10 reps per handout, with assistance as needed.                     Time Tracking:     OT Date of Treatment: 10/22/20  OT Start Time: 1650  OT Stop Time: 1705  OT Total Time (min): 15 min    Billable Minutes:Therapeutic Exercise 15    Addis Moseley, OT  10/22/2020

## 2020-10-22 NOTE — PLAN OF CARE
Problem: Physical Therapy Goal  Goal: Physical Therapy Goal  Description: Goals to be met by: 11/10/20    Patient will increase functional independence with mobility by performin. Rolling to sides using bed rail with contact guard assistance and cues  2. Supine to sit with minimal assistance and cues  3. Sit to supine with moderate assistance and cues  4. Tolerate Static Sit at side of the bed for 10 minutes with Standby Assistance  5. Bed to chair transfer with moderate assistance and cues using Slide Board TECHNIQUE  6. Lower extremity exercise program x15 reps per handout, with assistance as needed     Outcome: Ongoing, Progressing

## 2020-10-23 LAB — VANCOMYCIN TROUGH SERPL-MCNC: 20.2 UG/ML (ref 10–22)

## 2020-10-23 PROCEDURE — 99900035 HC TECH TIME PER 15 MIN (STAT)

## 2020-10-23 PROCEDURE — 94761 N-INVAS EAR/PLS OXIMETRY MLT: CPT

## 2020-10-23 PROCEDURE — 97530 THERAPEUTIC ACTIVITIES: CPT

## 2020-10-23 PROCEDURE — 63600175 PHARM REV CODE 636 W HCPCS: Performed by: INTERNAL MEDICINE

## 2020-10-23 PROCEDURE — 94799 UNLISTED PULMONARY SVC/PX: CPT

## 2020-10-23 PROCEDURE — 97535 SELF CARE MNGMENT TRAINING: CPT

## 2020-10-23 PROCEDURE — 63600175 PHARM REV CODE 636 W HCPCS: Performed by: NURSE PRACTITIONER

## 2020-10-23 PROCEDURE — 99308 PR NURSING FAC CARE, SUBSEQ, MINOR COMPLIC: ICD-10-PCS | Mod: ,,, | Performed by: STUDENT IN AN ORGANIZED HEALTH CARE EDUCATION/TRAINING PROGRAM

## 2020-10-23 PROCEDURE — A4216 STERILE WATER/SALINE, 10 ML: HCPCS | Performed by: INTERNAL MEDICINE

## 2020-10-23 PROCEDURE — 25000003 PHARM REV CODE 250: Performed by: NURSE PRACTITIONER

## 2020-10-23 PROCEDURE — 99308 SBSQ NF CARE LOW MDM 20: CPT | Mod: ,,, | Performed by: STUDENT IN AN ORGANIZED HEALTH CARE EDUCATION/TRAINING PROGRAM

## 2020-10-23 PROCEDURE — 11000004 HC SNF PRIVATE

## 2020-10-23 PROCEDURE — 36415 COLL VENOUS BLD VENIPUNCTURE: CPT

## 2020-10-23 PROCEDURE — 25000003 PHARM REV CODE 250: Performed by: INTERNAL MEDICINE

## 2020-10-23 PROCEDURE — 27000221 HC OXYGEN, UP TO 24 HOURS

## 2020-10-23 PROCEDURE — 80202 ASSAY OF VANCOMYCIN: CPT

## 2020-10-23 RX ORDER — KETOROLAC TROMETHAMINE 15 MG/ML
15 INJECTION, SOLUTION INTRAMUSCULAR; INTRAVENOUS ONCE
Status: COMPLETED | OUTPATIENT
Start: 2020-10-23 | End: 2020-10-23

## 2020-10-23 RX ORDER — KETOROLAC TROMETHAMINE 15 MG/ML
15 INJECTION, SOLUTION INTRAMUSCULAR; INTRAVENOUS DAILY PRN
Status: DISPENSED | OUTPATIENT
Start: 2020-10-23 | End: 2020-10-26

## 2020-10-23 RX ADMIN — ENOXAPARIN SODIUM 40 MG: 40 INJECTION SUBCUTANEOUS at 08:10

## 2020-10-23 RX ADMIN — Medication 10 ML: at 05:10

## 2020-10-23 RX ADMIN — ENOXAPARIN SODIUM 40 MG: 40 INJECTION SUBCUTANEOUS at 09:10

## 2020-10-23 RX ADMIN — FUROSEMIDE 40 MG: 40 TABLET ORAL at 09:10

## 2020-10-23 RX ADMIN — Medication 1750 MG: at 10:10

## 2020-10-23 RX ADMIN — ACETAMINOPHEN 500 MG: 500 TABLET, FILM COATED ORAL at 09:10

## 2020-10-23 RX ADMIN — MEMANTINE 10 MG: 10 TABLET ORAL at 09:10

## 2020-10-23 RX ADMIN — DONEPEZIL HYDROCHLORIDE 10 MG: 5 TABLET, FILM COATED ORAL at 09:10

## 2020-10-23 RX ADMIN — ISOSORBIDE MONONITRATE 60 MG: 60 TABLET, EXTENDED RELEASE ORAL at 09:10

## 2020-10-23 RX ADMIN — FERROUS SULFATE TAB 325 MG (65 MG ELEMENTAL FE) 325 MG: 325 (65 FE) TAB at 09:10

## 2020-10-23 RX ADMIN — Medication 10 ML: at 12:10

## 2020-10-23 RX ADMIN — HYDROCODONE BITARTRATE AND ACETAMINOPHEN 1 TABLET: 10; 325 TABLET ORAL at 05:10

## 2020-10-23 RX ADMIN — METHOCARBAMOL TABLETS 500 MG: 500 TABLET, COATED ORAL at 09:10

## 2020-10-23 RX ADMIN — MUPIROCIN: 20 OINTMENT TOPICAL at 08:10

## 2020-10-23 RX ADMIN — MUPIROCIN: 20 OINTMENT TOPICAL at 09:10

## 2020-10-23 RX ADMIN — MICONAZOLE NITRATE: 20 OINTMENT TOPICAL at 09:10

## 2020-10-23 RX ADMIN — MICONAZOLE NITRATE: 20 OINTMENT TOPICAL at 10:10

## 2020-10-23 RX ADMIN — DULOXETINE 60 MG: 30 CAPSULE, DELAYED RELEASE ORAL at 09:10

## 2020-10-23 RX ADMIN — ACETAMINOPHEN 500 MG: 500 TABLET, FILM COATED ORAL at 02:10

## 2020-10-23 RX ADMIN — METHOCARBAMOL TABLETS 500 MG: 500 TABLET, COATED ORAL at 01:10

## 2020-10-23 RX ADMIN — ASPIRIN 81 MG: 81 TABLET, COATED ORAL at 09:10

## 2020-10-23 RX ADMIN — METOPROLOL SUCCINATE 50 MG: 50 TABLET, EXTENDED RELEASE ORAL at 09:10

## 2020-10-23 RX ADMIN — HYDROCODONE BITARTRATE AND ACETAMINOPHEN 1 TABLET: 10; 325 TABLET ORAL at 09:10

## 2020-10-23 RX ADMIN — METHOCARBAMOL TABLETS 500 MG: 500 TABLET, COATED ORAL at 05:10

## 2020-10-23 RX ADMIN — PANTOPRAZOLE SODIUM 40 MG: 40 TABLET, DELAYED RELEASE ORAL at 05:10

## 2020-10-23 RX ADMIN — PRAVASTATIN SODIUM 40 MG: 40 TABLET ORAL at 09:10

## 2020-10-23 RX ADMIN — CLOPIDOGREL 75 MG: 75 TABLET, FILM COATED ORAL at 09:10

## 2020-10-23 RX ADMIN — KETOROLAC TROMETHAMINE 15 MG: 15 INJECTION, SOLUTION INTRAMUSCULAR; INTRAVENOUS at 10:10

## 2020-10-23 RX ADMIN — BISACODYL 5 MG: 5 TABLET, COATED ORAL at 09:10

## 2020-10-23 RX ADMIN — LISINOPRIL 10 MG: 10 TABLET ORAL at 09:10

## 2020-10-23 NOTE — PT/OT/SLP PROGRESS
Physical Therapy Treatment    Patient Name:  Martina Betancourt   MRN:  [id number]    Recommendations:     Discharge Recommendations:  nursing facility, basic   Discharge Equipment Recommendations: lift device   Barriers to discharge: Decreased caregiver support    Assessment:     Martina Betancourt is a 70 y.o. female admitted with a medical diagnosis of Debility.  She presents with the following impairments/functional limitations:  weakness, impaired endurance, impaired cognition, impaired self care skills, impaired functional mobilty, gait instability, impaired balance, decreased lower extremity function, decreased upper extremity function, impaired cardiopulmonary response to activity, pain, decreased safety awareness. Noted lesser pain at lower back and knees and tolerated better rolling to sides using bedrail with lesser physical assistance needed. Increased static sit tolerance at side of the bed for ~10 minutes with Standby Assistance for lunch meal(P Therapist set up the lunch meal and called NANCY Benton to assist patient to finished meal and repositioned patient back to bed at comfortable position. Noted tremors/shaking on bilat UE during eating.    Rehab Prognosis: Fair; patient would benefit from acute skilled PT services to address these deficits and reach maximum level of function.    Recent Surgery: * No surgery found *      Plan:     During this hospitalization, patient to be seen daily to address the identified rehab impairments via gait training, therapeutic activities, wheelchair management/training and progress toward the following goals:    · Plan of Care Expires:  11/10/20    Subjective     Chief Complaint: lower back pain and knees pain upon movement  Patient/Family Comments/goals: none stated  Pain/Comfort:  · Pain Rating 1: 7/10  · Location - Side 1: Bilateral  · Location - Orientation 1: lower  · Location 1: back  · Pain Addressed 1: Reposition, Pre-medicate for activity  · Pain Rating  Post-Intervention 1: 7/10  · Pain Rating 2: 7/10  · Location - Side 2: Bilateral  · Location - Orientation 2: lower  · Location 2: knee  · Pain Addressed 2: Reposition, Distraction  · Pain Rating Post-Intervention 2: 7/10      Objective:     Communicated with patient  prior to session.  Patient found supine with PureWick, oxygen, peripheral IV upon PT entry to room.     General Precautions: Standard, fall   Orthopedic Precautions:Full weight bearing(Discussed with nursing (Miya) today 10/22/20 and clarified with LAYO Martinez after checked X-Ray. Patient can apply full weight  bearing on Right LE/ankle.)   Braces: N/A     Functional Mobility:  · Bed Mobility:     · Rolling Left:  maximum/moderate assistance and cues using bedrail  · Rolling Right: maximum/moderate assistance and cues using bedrail  · Scooting: maximum assistance and cues  · Supine to Sit: maximum assistance and cues  · Sit to Supine: maximum x 2 assistance and cues  · Balance: Sit Static at side of the bed: Fair for 10 mins with Standby Assistance      AM-PAC 6 CLICK MOBILITY  Turning over in bed (including adjusting bedclothes, sheets and blankets)?: 2  Sitting down on and standing up from a chair with arms (e.g., wheelchair, bedside commode, etc.): 1  Moving from lying on back to sitting on the side of the bed?: 2  Moving to and from a bed to a chair (including a wheelchair)?: 1  Need to walk in hospital room?: 1  Climbing 3-5 steps with a railing?: 1  Basic Mobility Total Score: 8       Therapeutic Activities and Exercises:  Provided AAROM ex on bilat LE x 10 reps such as Ankle Pumps, heelslides and hips abd/adduction ex followed by body sequence bed mobility trng and static sitting balance and toelrance at side of the bed during self care activity(lunch meal).    Patient left supine with all lines intact, call button in reach, nursing notified and PCT present..    GOALS:   Multidisciplinary Problems     Physical Therapy Goals         Problem: Physical Therapy Goal    Goal Priority Disciplines Outcome Goal Variances Interventions   Physical Therapy Goal     PT, PT/OT Ongoing, Progressing     Description: Goals to be met by: 11/10/20    Patient will increase functional independence with mobility by performin. Rolling to sides using bed rail with contact guard assistance and cues  2. Supine to sit with minimal assistance and cues  3. Sit to supine with moderate assistance and cues  4. Tolerate Static Sit at side of the bed for 10 minutes with Standby Assistance  5. Bed to chair transfer with moderate assistance and cues using Slide Board TECHNIQUE  6. Lower extremity exercise program x15 reps per handout, with assistance as needed                      Time Tracking:     PT Received On: 10/23/20  PT Start Time: 1235     PT Stop Time: 1255  PT Total Time (min): 20 min     Billable Minutes: Therapeutic Activity 20    Treatment Type: Treatment  PT/PTA: PT           Edgar Lamas, PT  10/23/2020

## 2020-10-23 NOTE — PLAN OF CARE
Plan of Care discussed with patient.  Will continue turning and repositioning to prevent further breakdown.  Healing right heel PICC intact to right upper arm.  Dual ports clamped.  Flush without resistance.   Afebrile.  Pain medication effective.  Antibiotic therapy continues.  Purewick in place.  Voiding without difficulty. Adequate urine output.   Nasal cannula at 3 Liters.  SATs 90s.  Daily weight 134.5 kg.  Remains swing bed.

## 2020-10-23 NOTE — ASSESSMENT & PLAN NOTE
Increase lisinopril 2.5>10mg daily  10/23 SBP 160s at times; lisinopril just increased will give more time for lisinopril to work before titration

## 2020-10-23 NOTE — PLAN OF CARE
Patient admitted on SWING status for debility and MRSA infection to the blood stream.  Patient on IV Vancomycin, trough due tonight at 2200.  Here for therapy with PT/OT.  Still has non-weight bearing restrictions.  Able to sit up at bedside with both PT and OT today.  Patient feels she is moving a little easier.  Norco given as needed for pain.  Started on daily Toradol injections daily for pain as well.  Purewick in place, incontinence care maintained to prevent skin breakdown.  Assistance with meals.  Nystatin cream to abdominal folds for yeast.  VSS on 3 liters nasal cannula.

## 2020-10-23 NOTE — SUBJECTIVE & OBJECTIVE
Past Medical History:   Diagnosis Date    Alzheimer disease     Alzheimer disease     Coronary artery disease     Hyperlipidemia     Hypertension     Myopathy     Non-ST elevation (NSTEMI) myocardial infarction     Obesity     Pneumonia     Sleep apnea        Past Surgical History:   Procedure Laterality Date    APPENDECTOMY      APPENDECTOMY      CHOLECYSTECTOMY      CORONARY STENT PLACEMENT      HYSTERECTOMY      TONSILLECTOMY         Review of patient's allergies indicates:   Allergen Reactions    Hydroxyzine hcl     Tizanidine        No current facility-administered medications on file prior to encounter.      Current Outpatient Medications on File Prior to Encounter   Medication Sig    acetaminophen (TYLENOL) 325 MG tablet Take 2 tablets (650 mg total) by mouth every 6 (six) hours as needed (HEADACHE, TEMPERATURE - FEVER > 100.4).    albuterol-ipratropium (DUO-NEB) 2.5 mg-0.5 mg/3 mL nebulizer solution Take 3 mLs by nebulization every 6 (six) hours as needed for Wheezing or Shortness of Breath. Rescue    aspirin (ECOTRIN) 81 MG EC tablet Take 81 mg by mouth once daily.    clopidogreL (PLAVIX) 75 mg tablet Take 1 tablet (75 mg total) by mouth once daily.    donepeziL (ARICEPT) 10 MG tablet Take 10 mg by mouth every evening.    DULoxetine (CYMBALTA) 60 MG capsule Take 60 mg by mouth once daily.    ferrous sulfate 324 mg (65 mg iron) TbEC Take 325 mg by mouth every evening.    furosemide (LASIX) 40 MG tablet Take 1 tablet (40 mg total) by mouth once daily.    isosorbide mononitrate (IMDUR) 60 MG 24 hr tablet Take 60 mg by mouth once daily.    lisinopriL (PRINIVIL,ZESTRIL) 2.5 MG tablet Take 1 tablet (2.5 mg total) by mouth once daily.    memantine (NAMENDA XR) 28 mg CSpX Take 28 mg by mouth once daily.    metoprolol succinate (TOPROL-XL) 50 MG 24 hr tablet Take 50 mg by mouth once daily.    miconazole nitrate 2% (MICOTIN) 2 % Oint Apply topically 2 (two) times daily.    pantoprazole  (PROTONIX) 40 MG tablet Take 1 tablet (40 mg total) by mouth before breakfast.    pravastatin (PRAVACHOL) 40 MG tablet Take 40 mg by mouth every evening.     Family History     None        Tobacco Use    Smoking status: Unknown If Ever Smoked   Substance and Sexual Activity    Alcohol use: Not Currently    Drug use: Never    Sexual activity: Not Currently     Review of Systems   Constitutional: Negative for activity change, fatigue, fever and unexpected weight change.   HENT: Negative for congestion, ear pain, hearing loss, rhinorrhea and sore throat.    Eyes: Negative for redness and visual disturbance.   Respiratory: Negative for cough, shortness of breath and wheezing.    Cardiovascular: Negative for chest pain, palpitations and leg swelling.   Gastrointestinal: Negative for abdominal pain, constipation, diarrhea, nausea and vomiting.   Genitourinary: Negative for dysuria, frequency and urgency.   Musculoskeletal: Negative for back pain, joint swelling and neck pain.   Skin: Negative for color change, rash and wound.   Neurological: Negative for dizziness, tremors, weakness, light-headedness and headaches.     Objective:     Vital Signs (Most Recent):  Temp: 98.1 °F (36.7 °C) (10/23/20 0728)  Pulse: 68 (10/23/20 0728)  Resp: 20 (10/23/20 0728)  BP: (!) 165/75 (10/23/20 0728)  SpO2: 95 % (10/23/20 0728) Vital Signs (24h Range):  Temp:  [97.1 °F (36.2 °C)-98.1 °F (36.7 °C)] 98.1 °F (36.7 °C)  Pulse:  [68-77] 68  Resp:  [18-20] 20  SpO2:  [91 %-96 %] 95 %  BP: (146-167)/(63-76) 165/75     Weight: 134.5 kg (296 lb 8.3 oz)  Body mass index is 42.55 kg/m².    Physical Exam  Vitals signs and nursing note reviewed.   Constitutional:       General: She is not in acute distress.     Appearance: She is well-developed.   HENT:      Head: Normocephalic and atraumatic.      Right Ear: External ear normal.      Left Ear: External ear normal.      Nose: Nose normal.      Mouth/Throat:      Mouth: Mucous membranes are moist.       Pharynx: Oropharynx is clear.   Eyes:      General:         Right eye: No discharge.         Left eye: No discharge.      Extraocular Movements: Extraocular movements intact.      Conjunctiva/sclera: Conjunctivae normal.      Pupils: Pupils are equal, round, and reactive to light.   Neck:      Musculoskeletal: Neck supple.      Thyroid: No thyromegaly.   Cardiovascular:      Rate and Rhythm: Normal rate and regular rhythm.      Heart sounds: No murmur.   Pulmonary:      Effort: Pulmonary effort is normal. No respiratory distress.      Breath sounds: Normal breath sounds. No wheezing or rales.   Abdominal:      General: Bowel sounds are normal. There is no distension.      Palpations: Abdomen is soft.      Tenderness: There is no abdominal tenderness.   Lymphadenopathy:      Cervical: No cervical adenopathy.   Skin:     General: Skin is warm and dry.   Neurological:      Mental Status: She is alert and oriented to person, place, and time.      Cranial Nerves: No cranial nerve deficit.   Psychiatric:         Behavior: Behavior normal.           CRANIAL NERVES     CN III, IV, VI   Pupils are equal, round, and reactive to light.       Significant Labs:   CBC:   Recent Labs   Lab 10/22/20  0413   WBC 8.18   HGB 10.9*   HCT 37.2        CMP:   Recent Labs   Lab 10/22/20  0413      K 3.5   CL 99   CO2 34*   GLU 94   BUN 10   CREATININE 0.9   CALCIUM 10.1   ANIONGAP 8   EGFRNONAA >60     vanc trough 20.5    Body Fluid Type  Other (Specify)    Comment: T9-T10   Fluid Appearance  Clear    Fluid Color  Icteric    WBC, Body Fluid /cu mm 10    Comment: Reference ranges for body fluids not established.   Correlate clinically.    Segs, Fluid % 65    Lymphs, Fluid % 23    Monocytes/Macrophages, Fluid % 12    Aerobic culture no growth   Fungal culture in process  10/8 blood culture NGTD x 5    9/23 METHICILLIN RESISTANT STAPHYLOCOCCUS AUREUS  Clindamycin <=0.5 mcg/mL Sensitive      Erythromycin <=0.5 mcg/mL  Sensitive     Oxacillin >2 mcg/mL Resistant     Penicillin 8 mcg/mL Resistant     Tetracycline <=4 mcg/mL Sensitive     Trimeth/Sulfa <=0.5/9.5 m... Sensitive     Vancomycin 2 mcg/mL Sensitive      Significant Imaging:     10/19 CXR There is cardiomegaly.  Lungs are clear.  Central line tip upper SVC    10/13 CT lumbar   Findings above consistent with T9-T10 discitis/osteomyelitis with multilocular paravertebral abscesses, better evaluated on MRI 10/12/2020.  No significant retropulsion osseous fragments into the canal.     Multilevel lumbar spondylosis resulting in moderate to severe spinal canal stenosis from L2-L3 through L4-L5 and severe bilateral neural foraminal narrowing at L5-S1.     Mild multilevel thoracic spondylosis, detailed above.     Bilateral pleural effusions, larger on the left with compressive atelectasis/volume loss of adjacent lung parenchyma.     Moderate-large size hiatal hernia     Multi-vessel coronary artery atherosclerosis.     10/13 CT thoracic spine   Findings above consistent with T9-T10 discitis/osteomyelitis with multilocular paravertebral abscesses, better evaluated on MRI 10/12/2020.  No significant retropulsion osseous fragments into the canal.     Multilevel lumbar spondylosis resulting in moderate to severe spinal canal stenosis from L2-L3 through L4-L5 and severe bilateral neural foraminal narrowing at L5-S1.     Mild multilevel thoracic spondylosis, detailed above.     Bilateral pleural effusions, larger on the left with compressive atelectasis/volume loss of adjacent lung parenchyma.     Moderate-large size hiatal hernia     Multi-vessel coronary artery atherosclerosis.    10/12 MRI thoracic Discitis/osteomyelitis at the T9-T10 level with multilocular paravertebral abscesses similar to prior exam.     T8-T10 epidural phlegmon results in moderate narrowing of the thecal sac. There is associated nerve root enhancement, which may be seen with arachnoiditis.     Bilateral complex  pleural effusions with enhancement.  Empyema is a consideration.  Consider further evaluation with contrast enhanced chest CT.     Multilevel mild degenerative changes and disc bulges in the thoracic spine, most prominent at T5-T6.    10/12 MRI lumbar No evidence for spondylodiscitis.     Bilateral L5 spondylolysis with grade 2 anterolisthesis.     Multilevel degenerative changes of the lumbar spine detailed above.  Moderate to severe spinal canal stenosis noted at L2-L5.  Severe neural foraminal narrowing noted at L5-S1.    9/23 EKG Normal sinus rhythm  Nonspecific ST and T wave abnormality  Abnormal ECG  When compared with ECG of 10-AUG-2020 06:42,  Nonspecific T wave abnormality now evident in Lateral leads  Confirmed by Willie Matthews MD ([id number]) on 9/23/2020 3:05:10 PM

## 2020-10-23 NOTE — PROGRESS NOTES
[facility] Medicine  Progress Note    Patient Name: Martina Betancourt  MRN: [id number]  Patient Class: IP- Swing   Admission Date: 10/20/2020  Length of Stay: 3 days  Attending Physician: Layne Abbott MD  Primary Care Provider: Joe Fuller Iii, MD        Subjective:     Principal Problem:Debility        HPI:  69yo female patient with hx of alzheiners, CAD, HLD, HTN, myopathy, MI, obesity, sleep apnea and OA (knees non ambulatory uses gonzalez round). She was living at home with her daughter. Recently admitted to [facility] with MRSA bacteremia and subsequent pneumonia treated with Zyvox x 7 days with clearance of her bacteremia now admitted with back pain found to have T9-10 osteo discitis, T8-10 epidural phlegmon with associated paraverterbral abscesses. Spine infection likely hematogenous from under treated bacteremia. Neurosurgery has been consulted. She has been on Vancomycin and Ceftriaxone. Underwent T9-10 disc space biopsy with IR on 10/16. Cultures negative, though had been on IV antibiotics multiple days prior. Afebrile. HDS. Repeat blood cx sterile. ID rec cont vanc 1750mg q 24 till 12/3.     Overview/Hospital Course:  10/23 Here for skilled ; needing IV abx for spinal abscess;  vanc 1750mg q 24 till 12/3  Afebrile , 3LNC - O2 sat 95%   + debility, very deconditioned; bilt LE x 10 reps followed by bed mobility trng and static sit balance and tolerance at side of the bed  C/o back pain with activity; Occupational therapist notes that she is very resistant to movement and c/o severe pain with minimal activity.   Will order toradol 15mg x 1dose IV    Past Medical History:   Diagnosis Date    Alzheimer disease     Alzheimer disease     Coronary artery disease     Hyperlipidemia     Hypertension     Myopathy     Non-ST elevation (NSTEMI) myocardial infarction     Obesity     Pneumonia     Sleep apnea        Past Surgical History:   Procedure Laterality Date    APPENDECTOMY       APPENDECTOMY      CHOLECYSTECTOMY      CORONARY STENT PLACEMENT      HYSTERECTOMY      TONSILLECTOMY         Review of patient's allergies indicates:   Allergen Reactions    Hydroxyzine hcl     Tizanidine        No current facility-administered medications on file prior to encounter.      Current Outpatient Medications on File Prior to Encounter   Medication Sig    acetaminophen (TYLENOL) 325 MG tablet Take 2 tablets (650 mg total) by mouth every 6 (six) hours as needed (HEADACHE, TEMPERATURE - FEVER > 100.4).    albuterol-ipratropium (DUO-NEB) 2.5 mg-0.5 mg/3 mL nebulizer solution Take 3 mLs by nebulization every 6 (six) hours as needed for Wheezing or Shortness of Breath. Rescue    aspirin (ECOTRIN) 81 MG EC tablet Take 81 mg by mouth once daily.    clopidogreL (PLAVIX) 75 mg tablet Take 1 tablet (75 mg total) by mouth once daily.    donepeziL (ARICEPT) 10 MG tablet Take 10 mg by mouth every evening.    DULoxetine (CYMBALTA) 60 MG capsule Take 60 mg by mouth once daily.    ferrous sulfate 324 mg (65 mg iron) TbEC Take 325 mg by mouth every evening.    furosemide (LASIX) 40 MG tablet Take 1 tablet (40 mg total) by mouth once daily.    isosorbide mononitrate (IMDUR) 60 MG 24 hr tablet Take 60 mg by mouth once daily.    lisinopriL (PRINIVIL,ZESTRIL) 2.5 MG tablet Take 1 tablet (2.5 mg total) by mouth once daily.    memantine (NAMENDA XR) 28 mg CSpX Take 28 mg by mouth once daily.    metoprolol succinate (TOPROL-XL) 50 MG 24 hr tablet Take 50 mg by mouth once daily.    miconazole nitrate 2% (MICOTIN) 2 % Oint Apply topically 2 (two) times daily.    pantoprazole (PROTONIX) 40 MG tablet Take 1 tablet (40 mg total) by mouth before breakfast.    pravastatin (PRAVACHOL) 40 MG tablet Take 40 mg by mouth every evening.     Family History     None        Tobacco Use    Smoking status: Unknown If Ever Smoked   Substance and Sexual Activity    Alcohol use: Not Currently    Drug use: Never    Sexual  activity: Not Currently     Review of Systems   Constitutional: Negative for activity change, fatigue, fever and unexpected weight change.   HENT: Negative for congestion, ear pain, hearing loss, rhinorrhea and sore throat.    Eyes: Negative for redness and visual disturbance.   Respiratory: Negative for cough, shortness of breath and wheezing.    Cardiovascular: Negative for chest pain, palpitations and leg swelling.   Gastrointestinal: Negative for abdominal pain, constipation, diarrhea, nausea and vomiting.   Genitourinary: Negative for dysuria, frequency and urgency.   Musculoskeletal: Negative for back pain, joint swelling and neck pain.   Skin: Negative for color change, rash and wound.   Neurological: Negative for dizziness, tremors, weakness, light-headedness and headaches.     Objective:     Vital Signs (Most Recent):  Temp: 98.1 °F (36.7 °C) (10/23/20 0728)  Pulse: 68 (10/23/20 0728)  Resp: 20 (10/23/20 0728)  BP: (!) 165/75 (10/23/20 0728)  SpO2: 95 % (10/23/20 0728) Vital Signs (24h Range):  Temp:  [97.1 °F (36.2 °C)-98.1 °F (36.7 °C)] 98.1 °F (36.7 °C)  Pulse:  [68-77] 68  Resp:  [18-20] 20  SpO2:  [91 %-96 %] 95 %  BP: (146-167)/(63-76) 165/75     Weight: 134.5 kg (296 lb 8.3 oz)  Body mass index is 42.55 kg/m².    Physical Exam  Vitals signs and nursing note reviewed.   Constitutional:       General: She is not in acute distress.     Appearance: She is well-developed.   HENT:      Head: Normocephalic and atraumatic.      Right Ear: External ear normal.      Left Ear: External ear normal.      Nose: Nose normal.      Mouth/Throat:      Mouth: Mucous membranes are moist.      Pharynx: Oropharynx is clear.   Eyes:      General:         Right eye: No discharge.         Left eye: No discharge.      Extraocular Movements: Extraocular movements intact.      Conjunctiva/sclera: Conjunctivae normal.      Pupils: Pupils are equal, round, and reactive to light.   Neck:      Musculoskeletal: Neck supple.       Thyroid: No thyromegaly.   Cardiovascular:      Rate and Rhythm: Normal rate and regular rhythm.      Heart sounds: No murmur.   Pulmonary:      Effort: Pulmonary effort is normal. No respiratory distress.      Breath sounds: Normal breath sounds. No wheezing or rales.   Abdominal:      General: Bowel sounds are normal. There is no distension.      Palpations: Abdomen is soft.      Tenderness: There is no abdominal tenderness.   Lymphadenopathy:      Cervical: No cervical adenopathy.   Skin:     General: Skin is warm and dry.   Neurological:      Mental Status: She is alert and oriented to person, place, and time.      Cranial Nerves: No cranial nerve deficit.   Psychiatric:         Behavior: Behavior normal.           CRANIAL NERVES     CN III, IV, VI   Pupils are equal, round, and reactive to light.       Significant Labs:   CBC:   Recent Labs   Lab 10/22/20  0413   WBC 8.18   HGB 10.9*   HCT 37.2        CMP:   Recent Labs   Lab 10/22/20  0413      K 3.5   CL 99   CO2 34*   GLU 94   BUN 10   CREATININE 0.9   CALCIUM 10.1   ANIONGAP 8   EGFRNONAA >60     vanc trough 20.5    Body Fluid Type  Other (Specify)    Comment: T9-T10   Fluid Appearance  Clear    Fluid Color  Icteric    WBC, Body Fluid /cu mm 10    Comment: Reference ranges for body fluids not established.   Correlate clinically.    Segs, Fluid % 65    Lymphs, Fluid % 23    Monocytes/Macrophages, Fluid % 12    Aerobic culture no growth   Fungal culture in process  10/8 blood culture NGTD x 5    9/23 METHICILLIN RESISTANT STAPHYLOCOCCUS AUREUS  Clindamycin <=0.5 mcg/mL Sensitive      Erythromycin <=0.5 mcg/mL Sensitive     Oxacillin >2 mcg/mL Resistant     Penicillin 8 mcg/mL Resistant     Tetracycline <=4 mcg/mL Sensitive     Trimeth/Sulfa <=0.5/9.5 m... Sensitive     Vancomycin 2 mcg/mL Sensitive      Significant Imaging:     10/19 CXR There is cardiomegaly.  Lungs are clear.  Central line tip upper SVC    10/13 CT lumbar   Findings above  consistent with T9-T10 discitis/osteomyelitis with multilocular paravertebral abscesses, better evaluated on MRI 10/12/2020.  No significant retropulsion osseous fragments into the canal.     Multilevel lumbar spondylosis resulting in moderate to severe spinal canal stenosis from L2-L3 through L4-L5 and severe bilateral neural foraminal narrowing at L5-S1.     Mild multilevel thoracic spondylosis, detailed above.     Bilateral pleural effusions, larger on the left with compressive atelectasis/volume loss of adjacent lung parenchyma.     Moderate-large size hiatal hernia     Multi-vessel coronary artery atherosclerosis.     10/13 CT thoracic spine   Findings above consistent with T9-T10 discitis/osteomyelitis with multilocular paravertebral abscesses, better evaluated on MRI 10/12/2020.  No significant retropulsion osseous fragments into the canal.     Multilevel lumbar spondylosis resulting in moderate to severe spinal canal stenosis from L2-L3 through L4-L5 and severe bilateral neural foraminal narrowing at L5-S1.     Mild multilevel thoracic spondylosis, detailed above.     Bilateral pleural effusions, larger on the left with compressive atelectasis/volume loss of adjacent lung parenchyma.     Moderate-large size hiatal hernia     Multi-vessel coronary artery atherosclerosis.    10/12 MRI thoracic Discitis/osteomyelitis at the T9-T10 level with multilocular paravertebral abscesses similar to prior exam.     T8-T10 epidural phlegmon results in moderate narrowing of the thecal sac. There is associated nerve root enhancement, which may be seen with arachnoiditis.     Bilateral complex pleural effusions with enhancement.  Empyema is a consideration.  Consider further evaluation with contrast enhanced chest CT.     Multilevel mild degenerative changes and disc bulges in the thoracic spine, most prominent at T5-T6.    10/12 MRI lumbar No evidence for spondylodiscitis.     Bilateral L5 spondylolysis with grade 2  anterolisthesis.     Multilevel degenerative changes of the lumbar spine detailed above.  Moderate to severe spinal canal stenosis noted at L2-L5.  Severe neural foraminal narrowing noted at L5-S1.    9/23 EKG Normal sinus rhythm  Nonspecific ST and T wave abnormality  Abnormal ECG  When compared with ECG of 10-AUG-2020 06:42,  Nonspecific T wave abnormality now evident in Lateral leads  Confirmed by Willie Matthews MD ([id number]) on 9/23/2020 3:05:10 PM      Assessment/Plan:      * Debility  Was walking with walker prior to pneumonia/bacteremia admit last month then after d.c was only w/c bound (gonzalez round) will add pt here and try and get her as strong as poss as she lives at home with family.   10/23 Chronic co back ache but also has recent high ankle fx ; per EDDIE Vinson NP Spoke with Ariela VASQUES who will see patient while on SWING. She looked over x rays and hx and reports that patient can weight bear as tolerated. Nurse/OT/MD notified    Malnutrition of mild degree        Hydronephrosis  Cont asa, plavix, lasix, isosorbide, lisinopril, toprol and statin      CAD (coronary artery disease)  Cont asa, plavix, lasix, isosorbide, lisinopril, toprol and statin      HTN (hypertension)  Increase lisinopril 2.5>10mg daily  10/23 SBP 160s at times; lisinopril just increased will give more time for lisinopril to work before titration      Discitis  vanc 1450mg IV every 24hr x 8 weeks total till 12/3 per ID ; will stay here for the duration   PT    Severe obesity (BMI >= 40)        Obstructive sleep apnea treated with continuous positive airway pressure (CPAP)  Bring home cpap      Dementia without behavioral disturbance  Cont namenda and aricpet         VTE Risk Mitigation (From admission, onward)         Ordered     enoxaparin injection 40 mg  Every 12 hours      10/20/20 1671                Discharge Planning   BRIT:      Code Status: DNR   Is the patient medically ready for discharge?:     Reason for patient still in  hospital (select all that apply): Patient trending condition, Treatment and PT / OT recommendations  Discharge Plan A: Home with family, Home Health                  Caleb Elizabeth MD  Department of Hospital Medicine   [facility]

## 2020-10-23 NOTE — PROGRESS NOTES
Nursing Notes  Walking rounding.  Report received from Miya BARON. Patient asleep.  Respirations deep even.  Bed alarm on.      Huddle Comments

## 2020-10-23 NOTE — ASSESSMENT & PLAN NOTE
Was walking with walker prior to pneumonia/bacteremia admit last month then after d.c was only w/c bound (gonzalez round) will add pt here and try and get her as strong as poss as she lives at home with family.   10/23 Chronic co back ache but also has recent high ankle fx ; per EDDIE Vinson NP Spoke with Ariela VASQUES who will see patient while on SWING. She looked over x rays and hx and reports that patient can weight bear as tolerated. Nurse/OT/MD notified

## 2020-10-23 NOTE — PT/OT/SLP PROGRESS
Occupational Therapy   Treatment    Name: Martina Betancourt  MRN: [id number]  Admitting Diagnosis:  Debility       Recommendations:     Discharge Recommendations: nursing facility, basic  Discharge Equipment Recommendations:  lift device  Barriers to discharge:       Assessment:     Martina Betancourt is a 70 y.o. female with a medical diagnosis of Debility.  She presents with improvement in participation and ability to sit EOB after IV pain medication provided prior to therapy session. Therapist facilitated rolling with Max A and Supine to sit with Max A x 2. She continues to benefit from OT services at this time.     Performance deficits affecting function are weakness, impaired endurance, impaired cognition, impaired self care skills, decreased upper extremity function, impaired functional mobilty, decreased lower extremity function, impaired skin, gait instability, decreased safety awareness, impaired balance, impaired cardiopulmonary response to activity.     Rehab Prognosis:  Fair; patient would benefit from acute skilled OT services to address these deficits and reach maximum level of function.       Plan:     Patient to be seen 5 x/week to address the above listed problems via self-care/home management, therapeutic activities, therapeutic exercises  · Plan of Care Expires: 11/04/20  · Plan of Care Reviewed with: patient    Subjective     Pain/Comfort:  · Pain Rating 1: 10/10  · Location - Orientation 1: lower  · Location 1: back  · Pain Addressed 1: Pre-medicate for activity, Reposition, Nurse notified  · Pain Rating 2: 8/10    Objective:     Communicated with: Nursing prior to session.  Patient found supine with PureWick, oxygen, peripheral IV upon OT entry to room.    General Precautions: Standard, fall   Orthopedic Precautions:Full weight bearing   Braces: N/A     Occupational Performance:     Bed Mobility:    · Patient completed Rolling/Turning to Left with  maximal assistance  · Patient completed  Rolling/Turning to Right with maximal assistance  · Patient completed Supine to Sit with maximal assistance and 2 persons  · Patient completed Sit to Supine with maximal assistance and 2 persons     Functional Mobility/Transfers:  · Not completed    Activities of Daily Living:  · Not completed      [facility] 6 Click ADL: 10    Treatment & Education:  Therapist facilitated rolling with Max A with improved participation from patient with less encouragement required, supine to sit with Max A x 2 with decreased screaming in pain this date. Patient educated on importance of participation in therapy. Noted patient with improved performance after IV pain medication administered by nursing.     Patient left supine with all lines intact, call button in reach and PCTs presentEducation:      GOALS:   Multidisciplinary Problems     Occupational Therapy Goals        Problem: Occupational Therapy Goal    Goal Priority Disciplines Outcome Interventions   Occupational Therapy Goal     OT, PT/OT Ongoing, Progressing    Description: Goals to be met by: 11/4/2020     Patient will increase functional independence with ADLs by performing:    Feeding with Supervision.  UE Dressing with Supervision.  LE Dressing with Moderate Assistance.  Grooming while seated with Supervision.  Toileting from bedside commode with Minimal Assistance for hygiene and clothing management.   Sitting at edge of bed x5 minutes with Supervision.  Rolling to Bilateral with Supervision.   Supine to sit with Minimal Assistance.  Squat pivot transfers with Minimal Assistance.  Toilet transfer to bedside commode with Minimal Assistance.  Upper extremity exercise program x10 reps per handout, with assistance as needed.                     Time Tracking:     OT Date of Treatment: 10/23/20  OT Start Time: 1040  OT Stop Time: 1058  OT Total Time (min): 18 min    Billable Minutes:Self Care/Home Management 18 minutes    Golden Bartlett OT  10/23/2020

## 2020-10-24 PROCEDURE — 63600175 PHARM REV CODE 636 W HCPCS: Performed by: INTERNAL MEDICINE

## 2020-10-24 PROCEDURE — 27000221 HC OXYGEN, UP TO 24 HOURS

## 2020-10-24 PROCEDURE — 25000003 PHARM REV CODE 250: Performed by: NURSE PRACTITIONER

## 2020-10-24 PROCEDURE — 97530 THERAPEUTIC ACTIVITIES: CPT | Performed by: PHYSICAL THERAPIST

## 2020-10-24 PROCEDURE — 63600175 PHARM REV CODE 636 W HCPCS: Performed by: NURSE PRACTITIONER

## 2020-10-24 PROCEDURE — 11000004 HC SNF PRIVATE

## 2020-10-24 PROCEDURE — 94761 N-INVAS EAR/PLS OXIMETRY MLT: CPT

## 2020-10-24 PROCEDURE — 25000003 PHARM REV CODE 250: Performed by: STUDENT IN AN ORGANIZED HEALTH CARE EDUCATION/TRAINING PROGRAM

## 2020-10-24 PROCEDURE — 25000003 PHARM REV CODE 250: Performed by: INTERNAL MEDICINE

## 2020-10-24 PROCEDURE — A4216 STERILE WATER/SALINE, 10 ML: HCPCS | Performed by: INTERNAL MEDICINE

## 2020-10-24 PROCEDURE — 99900035 HC TECH TIME PER 15 MIN (STAT)

## 2020-10-24 RX ORDER — ALUMINUM HYDROXIDE, MAGNESIUM HYDROXIDE, AND SIMETHICONE 2400; 240; 2400 MG/30ML; MG/30ML; MG/30ML
30 SUSPENSION ORAL EVERY 6 HOURS PRN
Status: DISCONTINUED | OUTPATIENT
Start: 2020-10-24 | End: 2020-12-01 | Stop reason: HOSPADM

## 2020-10-24 RX ADMIN — HYDROCODONE BITARTRATE AND ACETAMINOPHEN 1 TABLET: 10; 325 TABLET ORAL at 09:10

## 2020-10-24 RX ADMIN — HYDROCODONE BITARTRATE AND ACETAMINOPHEN 1 TABLET: 10; 325 TABLET ORAL at 01:10

## 2020-10-24 RX ADMIN — DONEPEZIL HYDROCHLORIDE 10 MG: 5 TABLET, FILM COATED ORAL at 08:10

## 2020-10-24 RX ADMIN — METHOCARBAMOL TABLETS 500 MG: 500 TABLET, COATED ORAL at 08:10

## 2020-10-24 RX ADMIN — LISINOPRIL 10 MG: 10 TABLET ORAL at 09:10

## 2020-10-24 RX ADMIN — MUPIROCIN: 20 OINTMENT TOPICAL at 08:10

## 2020-10-24 RX ADMIN — ENOXAPARIN SODIUM 40 MG: 40 INJECTION SUBCUTANEOUS at 08:10

## 2020-10-24 RX ADMIN — PANTOPRAZOLE SODIUM 40 MG: 40 TABLET, DELAYED RELEASE ORAL at 05:10

## 2020-10-24 RX ADMIN — BISACODYL 5 MG: 5 TABLET, COATED ORAL at 09:10

## 2020-10-24 RX ADMIN — METOPROLOL SUCCINATE 50 MG: 50 TABLET, EXTENDED RELEASE ORAL at 09:10

## 2020-10-24 RX ADMIN — METHOCARBAMOL TABLETS 500 MG: 500 TABLET, COATED ORAL at 06:10

## 2020-10-24 RX ADMIN — PRAVASTATIN SODIUM 40 MG: 40 TABLET ORAL at 08:10

## 2020-10-24 RX ADMIN — MEMANTINE 10 MG: 10 TABLET ORAL at 08:10

## 2020-10-24 RX ADMIN — MICONAZOLE NITRATE: 20 OINTMENT TOPICAL at 09:10

## 2020-10-24 RX ADMIN — Medication 10 ML: at 05:10

## 2020-10-24 RX ADMIN — FUROSEMIDE 40 MG: 40 TABLET ORAL at 09:10

## 2020-10-24 RX ADMIN — MELATONIN TAB 3 MG 6 MG: 3 TAB at 01:10

## 2020-10-24 RX ADMIN — METHOCARBAMOL TABLETS 500 MG: 500 TABLET, COATED ORAL at 12:10

## 2020-10-24 RX ADMIN — KETOROLAC TROMETHAMINE 15 MG: 15 INJECTION, SOLUTION INTRAMUSCULAR; INTRAVENOUS at 10:10

## 2020-10-24 RX ADMIN — ENOXAPARIN SODIUM 40 MG: 40 INJECTION SUBCUTANEOUS at 09:10

## 2020-10-24 RX ADMIN — ALUMINUM HYDROXIDE, MAGNESIUM HYDROXIDE, AND DIMETHICONE 30 ML: 400; 400; 40 SUSPENSION ORAL at 03:10

## 2020-10-24 RX ADMIN — MICONAZOLE NITRATE: 20 OINTMENT TOPICAL at 08:10

## 2020-10-24 RX ADMIN — FERROUS SULFATE TAB 325 MG (65 MG ELEMENTAL FE) 325 MG: 325 (65 FE) TAB at 08:10

## 2020-10-24 RX ADMIN — ASPIRIN 81 MG: 81 TABLET, COATED ORAL at 09:10

## 2020-10-24 RX ADMIN — Medication 10 ML: at 12:10

## 2020-10-24 RX ADMIN — ACETAMINOPHEN 500 MG: 500 TABLET, FILM COATED ORAL at 08:10

## 2020-10-24 RX ADMIN — ONDANSETRON 4 MG: 4 TABLET, ORALLY DISINTEGRATING ORAL at 09:10

## 2020-10-24 RX ADMIN — ISOSORBIDE MONONITRATE 60 MG: 60 TABLET, EXTENDED RELEASE ORAL at 09:10

## 2020-10-24 RX ADMIN — DULOXETINE 60 MG: 30 CAPSULE, DELAYED RELEASE ORAL at 09:10

## 2020-10-24 RX ADMIN — MEMANTINE 10 MG: 10 TABLET ORAL at 09:10

## 2020-10-24 RX ADMIN — HYDROCODONE BITARTRATE AND ACETAMINOPHEN 1 TABLET: 10; 325 TABLET ORAL at 06:10

## 2020-10-24 RX ADMIN — METHOCARBAMOL TABLETS 500 MG: 500 TABLET, COATED ORAL at 09:10

## 2020-10-24 RX ADMIN — Medication 10 ML: at 06:10

## 2020-10-24 RX ADMIN — CLOPIDOGREL 75 MG: 75 TABLET, FILM COATED ORAL at 09:10

## 2020-10-24 RX ADMIN — MUPIROCIN: 20 OINTMENT TOPICAL at 09:10

## 2020-10-24 RX ADMIN — Medication 1750 MG: at 10:10

## 2020-10-24 NOTE — PROGRESS NOTES
Pharmacokinetic Assessment Follow Up: IV Vancomycin    Vancomycin serum concentration assessment(s):    The trough level was drawn correctly and can be used to guide therapy at this time. The measurement is within the desired definitive target range of 15 to 20 mcg/mL.    Vancomycin Regimen Plan:    Continue regimen to Vancomycin 1750 mg IV every 24 hours with next serum trough concentration measured at 22:00 prior to 3rd dose on 10-26    Drug levels (last 3 results):  Recent Labs   Lab Result Units 10/21/20  2206 10/23/20  2208   Vancomycin-Trough ug/mL 19.4 20.2       Pharmacy will continue to follow and monitor vancomycin.    Please contact pharmacy at extension [phone] for questions regarding this assessment.    Thank you for the consult,   Christopher Rivera       Patient brief summary:  Martina Betancourt is a 70 y.o. female initiated on antimicrobial therapy with IV Vancomycin for treatment of bone/joint infection    The patient's current regimen is 1750 q 24    Drug Allergies:   Review of patient's allergies indicates:   Allergen Reactions    Hydroxyzine hcl     Tizanidine        Actual Body Weight:   134.5 kg    Renal Function:   Estimated Creatinine Clearance: 84.6 mL/min (based on SCr of 0.9 mg/dL).,     Dialysis Method (if applicable):  N/A    CBC (last 72 hours):  Recent Labs   Lab Result Units 10/22/20  0413   WBC K/uL 8.18   Hemoglobin g/dL 10.9*   Hematocrit % 37.2   Platelets K/uL 335   Gran% % 60.7   Lymph% % 22.1   Mono% % 13.7   Eosinophil% % 2.6   Basophil% % 0.4   Differential Method  Automated       Metabolic Panel (last 72 hours):  Recent Labs   Lab Result Units 10/22/20  0413   Sodium mmol/L 141   Potassium mmol/L 3.5   Chloride mmol/L 99   CO2 mmol/L 34*   Glucose mg/dL 94   BUN, Bld mg/dL 10   Creatinine mg/dL 0.9   Magnesium mg/dL 2.1   Phosphorus mg/dL 3.6       Vancomycin Administrations:  vancomycin given in the last 96 hours                     vancomycin 1750 mg in 0.9% sodium chloride 500  mL IVPB (mg) 1,750 mg New Bag 10/23/20 2226     1,750 mg New Bag 10/22/20 2228     1,750 mg New Bag 10/21/20 2255     1,750 mg New Bag 10/20/20 2300    vancomycin (VANCOCIN) 1,000 mg injection (mg) 1,750 mg Given 10/21/20 0015    vancomycin 1.75 g in 5 % dextrose 500 mL IVPB (mg) 1,750 mg New Bag 10/20/20 1733                    Microbiologic Results:  Microbiology Results (last 7 days)       ** No results found for the last 168 hours. **

## 2020-10-24 NOTE — PT/OT/SLP PROGRESS
Physical Therapy Treatment    Patient Name:  Martina Betancourt   MRN:  [id number]    Recommendations:     Discharge Recommendations:  nursing facility, basic   Discharge Equipment Recommendations: lift device   Barriers to discharge: Inaccessible home and Decreased caregiver support    Assessment:     Martina Betancourt is a 70 y.o. female admitted with a medical diagnosis of Debility.  She presents with the following impairments/functional limitations:  weakness, impaired balance, impaired endurance, impaired functional mobilty, gait instability, decreased lower extremity function.  Pt continues to have difficulty with all aspects of mobility with poor tolerance to sitting EOB and inability to attempt transfers.  Today, she required max of two people to reach EOB and to return to supine at conclusion fo the session and was limited by dizziness and fatigue while sitting. .    Rehab Prognosis: Fair; patient would benefit from acute skilled PT services to address these deficits and reach maximum level of function.    Recent Surgery: * No surgery found *      Plan:     During this hospitalization, patient to be seen daily to address the identified rehab impairments via gait training, therapeutic activities, therapeutic exercises and progress toward the following goals:    · Plan of Care Expires:  11/10/20    Subjective     Chief Complaint: fatigue, weakness, dizziness  Patient/Family Comments/goals: pt complaining of dizziness with sitting EOB  Pain/Comfort:  · Pain Rating 1: 7/10  · Location - Side 1: Bilateral  · Location - Orientation 1: lower  · Location 1: back  · Pain Addressed 1: Reposition  · Pain Rating Post-Intervention 1: 7/10      Objective:     Communicated with nurse prior to session.  Patient found supine with PureWick, telemetry, peripheral IV, oxygen upon PT entry to room.     General Precautions: Standard, fall   Orthopedic Precautions:Full weight bearing   Braces: N/A     Functional Mobility:  · Bed  Mobility:     · Rolling Left:  maximal assistance  · Rolling Right: maximal assistance  · Scooting: dependence  · Supine to Sit: maximal assistance and of 2 persons  · Sit to Supine: maximal assistance and of 2 persons      AM-PAC 6 CLICK MOBILITY  Turning over in bed (including adjusting bedclothes, sheets and blankets)?: 2  Sitting down on and standing up from a chair with arms (e.g., wheelchair, bedside commode, etc.): 1  Moving from lying on back to sitting on the side of the bed?: 2  Moving to and from a bed to a chair (including a wheelchair)?: 1  Need to walk in hospital room?: 1  Climbing 3-5 steps with a railing?: 1  Basic Mobility Total Score: 8       Therapeutic Activities and Exercises:   pt educated on need for sitting EOB.  (refused education demanding to return to supine after about 6-7 minutes sitting EOB  PT attempted to work on dynamic sitting balance reaching with UE.  Pt performed 2-3 reps reaching forward maintaining balance with min A.  Pt able to sit static EOB for 6-7 minutes with CGA/min A for maintaining position  PT and tech assisted to reposition in bed performing rolling bilateral with max assist.  Pt able to reach for rails and pull minimally with UE to roll.     Patient left supine with all lines intact and call button in reach..    GOALS:   Multidisciplinary Problems     Physical Therapy Goals        Problem: Physical Therapy Goal    Goal Priority Disciplines Outcome Goal Variances Interventions   Physical Therapy Goal     PT, PT/OT Ongoing, Progressing     Description: Goals to be met by: 11/10/20    Patient will increase functional independence with mobility by performin. Rolling to sides using bed rail with contact guard assistance and cues  2. Supine to sit with minimal assistance and cues  3. Sit to supine with moderate assistance and cues  4. Tolerate Static Sit at side of the bed for 10 minutes with Standby Assistance  5. Bed to chair transfer with moderate assistance  and cues using Slide Board TECHNIQUE  6. Lower extremity exercise program x15 reps per handout, with assistance as needed                      Time Tracking:     PT Received On: 10/24/20  PT Start Time: 1030     PT Stop Time: 1045  PT Total Time (min): 15 min     Billable Minutes: Therapeutic Activity 15    Treatment Type: Treatment  PT/PTA: PT           Jose Cruz Donato, PT  10/24/2020

## 2020-10-24 NOTE — PLAN OF CARE
Problem: Adult Inpatient Plan of Care  Goal: Plan of Care Review  Outcome: Ongoing, Progressing     Problem: Adult Inpatient Plan of Care  Goal: Absence of Hospital-Acquired Illness or Injury  Outcome: Ongoing, Progressing

## 2020-10-24 NOTE — PLAN OF CARE
Patient with recent discitis secondary to MRSA bacteremia.  On 10/16/20, aspiration biopsy done at [facility].  Transferred here 10/20/20 for long-term antibiotics and SNF for debility/PT.  Patient on IV Vancomycin every 24 hours.  Next trough scheduled 10/26/20 at 2200.    PT able to get patient up to edge of bed today.  Difficulty with any movement/mobility.  Still too weak to attempt transferring.  Patient with history of dementia, lucid and oriented currently.  Incontinence, purewick in place for skin breakdown prevention.  Patient VSS on 3 liters nasal cannula; afebrile.  Plan is to continue IV antibiotics while strengthening with PT.

## 2020-10-25 PROCEDURE — 27000221 HC OXYGEN, UP TO 24 HOURS

## 2020-10-25 PROCEDURE — 25000003 PHARM REV CODE 250: Performed by: INTERNAL MEDICINE

## 2020-10-25 PROCEDURE — 63600175 PHARM REV CODE 636 W HCPCS: Performed by: INTERNAL MEDICINE

## 2020-10-25 PROCEDURE — 11000004 HC SNF PRIVATE

## 2020-10-25 PROCEDURE — A4216 STERILE WATER/SALINE, 10 ML: HCPCS | Performed by: INTERNAL MEDICINE

## 2020-10-25 PROCEDURE — 63600175 PHARM REV CODE 636 W HCPCS: Performed by: NURSE PRACTITIONER

## 2020-10-25 PROCEDURE — 97530 THERAPEUTIC ACTIVITIES: CPT | Performed by: PHYSICAL THERAPIST

## 2020-10-25 PROCEDURE — 99900035 HC TECH TIME PER 15 MIN (STAT)

## 2020-10-25 PROCEDURE — 94761 N-INVAS EAR/PLS OXIMETRY MLT: CPT

## 2020-10-25 PROCEDURE — 25000003 PHARM REV CODE 250: Performed by: NURSE PRACTITIONER

## 2020-10-25 RX ADMIN — Medication 1750 MG: at 11:10

## 2020-10-25 RX ADMIN — MEMANTINE 10 MG: 10 TABLET ORAL at 09:10

## 2020-10-25 RX ADMIN — ACETAMINOPHEN 500 MG: 500 TABLET, FILM COATED ORAL at 09:10

## 2020-10-25 RX ADMIN — MICONAZOLE NITRATE: 20 OINTMENT TOPICAL at 09:10

## 2020-10-25 RX ADMIN — CLOPIDOGREL 75 MG: 75 TABLET, FILM COATED ORAL at 09:10

## 2020-10-25 RX ADMIN — Medication 10 ML: at 05:10

## 2020-10-25 RX ADMIN — METHOCARBAMOL TABLETS 500 MG: 500 TABLET, COATED ORAL at 12:10

## 2020-10-25 RX ADMIN — METHOCARBAMOL TABLETS 500 MG: 500 TABLET, COATED ORAL at 09:10

## 2020-10-25 RX ADMIN — ENOXAPARIN SODIUM 40 MG: 40 INJECTION SUBCUTANEOUS at 09:10

## 2020-10-25 RX ADMIN — HYDROCODONE BITARTRATE AND ACETAMINOPHEN 1 TABLET: 10; 325 TABLET ORAL at 09:10

## 2020-10-25 RX ADMIN — ASPIRIN 81 MG: 81 TABLET, COATED ORAL at 09:10

## 2020-10-25 RX ADMIN — PRAVASTATIN SODIUM 40 MG: 40 TABLET ORAL at 09:10

## 2020-10-25 RX ADMIN — FERROUS SULFATE TAB 325 MG (65 MG ELEMENTAL FE) 325 MG: 325 (65 FE) TAB at 09:10

## 2020-10-25 RX ADMIN — MUPIROCIN: 20 OINTMENT TOPICAL at 09:10

## 2020-10-25 RX ADMIN — Medication 10 ML: at 11:10

## 2020-10-25 RX ADMIN — Medication 10 ML: at 12:10

## 2020-10-25 RX ADMIN — HYDROCODONE BITARTRATE AND ACETAMINOPHEN 1 TABLET: 10; 325 TABLET ORAL at 02:10

## 2020-10-25 RX ADMIN — PANTOPRAZOLE SODIUM 40 MG: 40 TABLET, DELAYED RELEASE ORAL at 05:10

## 2020-10-25 RX ADMIN — BISACODYL 5 MG: 5 TABLET, COATED ORAL at 09:10

## 2020-10-25 RX ADMIN — ACETAMINOPHEN 500 MG: 500 TABLET, FILM COATED ORAL at 02:10

## 2020-10-25 RX ADMIN — DULOXETINE 60 MG: 30 CAPSULE, DELAYED RELEASE ORAL at 09:10

## 2020-10-25 RX ADMIN — KETOROLAC TROMETHAMINE 15 MG: 15 INJECTION, SOLUTION INTRAMUSCULAR; INTRAVENOUS at 09:10

## 2020-10-25 RX ADMIN — LISINOPRIL 10 MG: 10 TABLET ORAL at 09:10

## 2020-10-25 RX ADMIN — DONEPEZIL HYDROCHLORIDE 10 MG: 5 TABLET, FILM COATED ORAL at 09:10

## 2020-10-25 RX ADMIN — ENOXAPARIN SODIUM 40 MG: 40 INJECTION SUBCUTANEOUS at 10:10

## 2020-10-25 RX ADMIN — ISOSORBIDE MONONITRATE 60 MG: 60 TABLET, EXTENDED RELEASE ORAL at 09:10

## 2020-10-25 RX ADMIN — FUROSEMIDE 40 MG: 40 TABLET ORAL at 09:10

## 2020-10-25 RX ADMIN — METOPROLOL SUCCINATE 50 MG: 50 TABLET, EXTENDED RELEASE ORAL at 09:10

## 2020-10-25 RX ADMIN — METHOCARBAMOL TABLETS 500 MG: 500 TABLET, COATED ORAL at 05:10

## 2020-10-25 NOTE — PLAN OF CARE
Pt remains on swing for long term vancomycin for MRSA bacteremia. PICC line to right arm.  Receiving PT and OT services due to debility. Vitals stable. Afebrile. Maintaining oxygen sats mid 90s on 3 liters via nasal cannula. Denies cough or shortness of breath. Frequent complaints of back pain; relieved with prn medications. Unable to tolerate long periods of activity. Turning every 2 hours. External catheter in use. Stage 2 wound to right heel; wound care daily. Keeping heel booties in place. Remains free from injury/ falls. Reviewed plan of care with pt; states agreement.

## 2020-10-25 NOTE — PT/OT/SLP PROGRESS
Physical Therapy Treatment    Patient Name:  aMrtina Betancourt   MRN:  [id number]    Recommendations:     Discharge Recommendations:  nursing facility, skilled   Discharge Equipment Recommendations: lift device   Barriers to discharge: None    Assessment:     Martina Betancourt is a 70 y.o. female admitted with a medical diagnosis of Debility.  She presents with the following impairments/functional limitations:  weakness, impaired balance, impaired endurance, impaired functional mobilty, gait instability, decreased lower extremity function, decreased upper extremity function, pain, decreased ROM.  Pt with improved initiation for bed mobility today pushing up through UE for supine to sit and reaching for bed rail and pulling a little better for rolling.  Pt also able to attempt and ocmplete 1 trial of sit to stand with max assist of two maintaining standing position for about 10-15 seconds.  .    Rehab Prognosis: Fair; patient would benefit from acute skilled PT services to address these deficits and reach maximum level of function.    Recent Surgery: * No surgery found *      Plan:     During this hospitalization, patient to be seen daily to address the identified rehab impairments via gait training, therapeutic activities, therapeutic exercises and progress toward the following goals:    · Plan of Care Expires:  11/10/20    Subjective     Chief Complaint: fatigue and back pain  Patient/Family Comments/goals: pt continues to report dizziness upon sitting initially then reports improvement.  Pt with momentary episodes of inattention during sitting EOB but then regains attention   Pain/Comfort:  · Pain Rating 1: 7/10  · Location - Side 1: Bilateral  · Location - Orientation 1: lower  · Location 1: back  · Pain Addressed 1: Reposition  · Pain Rating Post-Intervention 1: 6/10      Objective:     Communicated with nurse prior to session.  Patient found supine with oxygen, PureWick upon PT entry to room.     General  Precautions: Standard, fall   Orthopedic Precautions:Full weight bearing 2  Braces: N/A     Functional Mobility:  · Bed Mobility:     · Supine to Sit: maximal assistance, of 2 persons and patient able to reach with UE to push through rail and with improved push off with UE and initiation  · Sit to Supine: maximal assistance of 2 people with improved ability of patient to control descent of upper body  · Transfers:     · Sit to Stand:  moderate assistance, maximal assistance of 2 persons with no AD.  PT and PT tech blocking both knees while assisting pt to elevate hips from bed  · Balance: pt able to stand x15 seconds EOB limited largely by knee and back pain.         AM-PAC 6 CLICK MOBILITY  Turning over in bed (including adjusting bedclothes, sheets and blankets)?: 2  Sitting down on and standing up from a chair with arms (e.g., wheelchair, bedside commode, etc.): 2  Moving from lying on back to sitting on the side of the bed?: 2  Moving to and from a bed to a chair (including a wheelchair)?: 1  Need to walk in hospital room?: 1  Climbing 3-5 steps with a railing?: 1  Basic Mobility Total Score: 9       Therapeutic Activities and Exercises:   pt performed seated balance CGA progressing to SBA/Supervision  Pt performed seated LAQ, hip flexion, and ankle pumps x10 each limited ROM for all  Pt performed static standing x10-15 seconds     Patient left supine with all lines intact and call button in reach..    GOALS:   Multidisciplinary Problems     Physical Therapy Goals        Problem: Physical Therapy Goal    Goal Priority Disciplines Outcome Goal Variances Interventions   Physical Therapy Goal     PT, PT/OT Ongoing, Progressing     Description: Goals to be met by: 11/10/20    Patient will increase functional independence with mobility by performin. Rolling to sides using bed rail with contact guard assistance and cues  2. Supine to sit with minimal assistance and cues  3. Sit to supine with moderate  assistance and cues  4. Tolerate Static Sit at side of the bed for 10 minutes with Standby Assistance  5. Bed to chair transfer with moderate assistance and cues using Slide Board TECHNIQUE  6. Lower extremity exercise program x15 reps per handout, with assistance as needed                      Time Tracking:     PT Received On: 10/25/20  PT Start Time: 0921     PT Stop Time: 0936  PT Total Time (min): 15 min     Billable Minutes: Therapeutic Activity 15    Treatment Type: Treatment  PT/PTA: PT     PTA Visit Number: 0     Jose Cruz [place], [place]  10/25/2020

## 2020-10-25 NOTE — PLAN OF CARE
Problem: Adult Inpatient Plan of Care  Goal: Plan of Care Review  Outcome: Ongoing, Progressing     Problem: Bariatric Environmental Safety  Goal: Safety Maintained with Care  Outcome: Ongoing, Progressing

## 2020-10-26 PROBLEM — R19.7 DIARRHEA: Status: ACTIVE | Noted: 2020-10-26

## 2020-10-26 LAB
ANION GAP SERPL CALC-SCNC: 9 MMOL/L (ref 8–16)
BASOPHILS # BLD AUTO: 0.02 K/UL (ref 0–0.2)
BASOPHILS NFR BLD: 0.2 % (ref 0–1.9)
BUN SERPL-MCNC: 18 MG/DL (ref 8–23)
CALCIUM SERPL-MCNC: 9.7 MG/DL (ref 8.7–10.5)
CHLORIDE SERPL-SCNC: 99 MMOL/L (ref 95–110)
CO2 SERPL-SCNC: 32 MMOL/L (ref 23–29)
CREAT SERPL-MCNC: 0.8 MG/DL (ref 0.5–1.4)
DIFFERENTIAL METHOD: ABNORMAL
EOSINOPHIL # BLD AUTO: 0.3 K/UL (ref 0–0.5)
EOSINOPHIL NFR BLD: 2.3 % (ref 0–8)
ERYTHROCYTE [DISTWIDTH] IN BLOOD BY AUTOMATED COUNT: 15.9 % (ref 11.5–14.5)
EST. GFR  (AFRICAN AMERICAN): >60 ML/MIN/1.73 M^2
EST. GFR  (NON AFRICAN AMERICAN): >60 ML/MIN/1.73 M^2
GLUCOSE SERPL-MCNC: 84 MG/DL (ref 70–110)
HCT VFR BLD AUTO: 34.2 % (ref 37–48.5)
HGB BLD-MCNC: 9.9 G/DL (ref 12–16)
IMM GRANULOCYTES # BLD AUTO: 0.05 K/UL (ref 0–0.04)
IMM GRANULOCYTES NFR BLD AUTO: 0.4 % (ref 0–0.5)
LYMPHOCYTES # BLD AUTO: 1.7 K/UL (ref 1–4.8)
LYMPHOCYTES NFR BLD: 12.8 % (ref 18–48)
MAGNESIUM SERPL-MCNC: 2 MG/DL (ref 1.6–2.6)
MCH RBC QN AUTO: 27 PG (ref 27–31)
MCHC RBC AUTO-ENTMCNC: 28.9 G/DL (ref 32–36)
MCV RBC AUTO: 93 FL (ref 82–98)
MONOCYTES # BLD AUTO: 1.6 K/UL (ref 0.3–1)
MONOCYTES NFR BLD: 12 % (ref 4–15)
NEUTROPHILS # BLD AUTO: 9.3 K/UL (ref 1.8–7.7)
NEUTROPHILS NFR BLD: 72.3 % (ref 38–73)
NRBC BLD-RTO: 0 /100 WBC
OB PNL STL: POSITIVE
PHOSPHATE SERPL-MCNC: 2.8 MG/DL (ref 2.7–4.5)
PLATELET # BLD AUTO: 285 K/UL (ref 150–350)
PMV BLD AUTO: 10 FL (ref 9.2–12.9)
POTASSIUM SERPL-SCNC: 3.9 MMOL/L (ref 3.5–5.1)
RBC # BLD AUTO: 3.67 M/UL (ref 4–5.4)
SODIUM SERPL-SCNC: 140 MMOL/L (ref 136–145)
VANCOMYCIN TROUGH SERPL-MCNC: 21.2 UG/ML (ref 10–22)
WBC # BLD AUTO: 12.9 K/UL (ref 3.9–12.7)

## 2020-10-26 PROCEDURE — 87427 SHIGA-LIKE TOXIN AG IA: CPT

## 2020-10-26 PROCEDURE — 82272 OCCULT BLD FECES 1-3 TESTS: CPT

## 2020-10-26 PROCEDURE — 84100 ASSAY OF PHOSPHORUS: CPT

## 2020-10-26 PROCEDURE — 25000003 PHARM REV CODE 250: Performed by: INTERNAL MEDICINE

## 2020-10-26 PROCEDURE — 99900035 HC TECH TIME PER 15 MIN (STAT)

## 2020-10-26 PROCEDURE — 85025 COMPLETE CBC W/AUTO DIFF WBC: CPT

## 2020-10-26 PROCEDURE — 87046 STOOL CULTR AEROBIC BACT EA: CPT

## 2020-10-26 PROCEDURE — 97110 THERAPEUTIC EXERCISES: CPT

## 2020-10-26 PROCEDURE — 97535 SELF CARE MNGMENT TRAINING: CPT

## 2020-10-26 PROCEDURE — 80048 BASIC METABOLIC PNL TOTAL CA: CPT

## 2020-10-26 PROCEDURE — 36415 COLL VENOUS BLD VENIPUNCTURE: CPT

## 2020-10-26 PROCEDURE — 11000004 HC SNF PRIVATE

## 2020-10-26 PROCEDURE — 87209 SMEAR COMPLEX STAIN: CPT

## 2020-10-26 PROCEDURE — 80202 ASSAY OF VANCOMYCIN: CPT

## 2020-10-26 PROCEDURE — 27000221 HC OXYGEN, UP TO 24 HOURS

## 2020-10-26 PROCEDURE — 99308 SBSQ NF CARE LOW MDM 20: CPT | Mod: ,,, | Performed by: STUDENT IN AN ORGANIZED HEALTH CARE EDUCATION/TRAINING PROGRAM

## 2020-10-26 PROCEDURE — 25000003 PHARM REV CODE 250: Performed by: NURSE PRACTITIONER

## 2020-10-26 PROCEDURE — 94761 N-INVAS EAR/PLS OXIMETRY MLT: CPT

## 2020-10-26 PROCEDURE — 63600175 PHARM REV CODE 636 W HCPCS: Performed by: INTERNAL MEDICINE

## 2020-10-26 PROCEDURE — A4216 STERILE WATER/SALINE, 10 ML: HCPCS | Performed by: INTERNAL MEDICINE

## 2020-10-26 PROCEDURE — 63600175 PHARM REV CODE 636 W HCPCS: Performed by: NURSE PRACTITIONER

## 2020-10-26 PROCEDURE — 83735 ASSAY OF MAGNESIUM: CPT

## 2020-10-26 PROCEDURE — 87045 FECES CULTURE AEROBIC BACT: CPT

## 2020-10-26 PROCEDURE — 99308 PR NURSING FAC CARE, SUBSEQ, MINOR COMPLIC: ICD-10-PCS | Mod: ,,, | Performed by: STUDENT IN AN ORGANIZED HEALTH CARE EDUCATION/TRAINING PROGRAM

## 2020-10-26 PROCEDURE — 89055 LEUKOCYTE ASSESSMENT FECAL: CPT

## 2020-10-26 PROCEDURE — 97530 THERAPEUTIC ACTIVITIES: CPT

## 2020-10-26 RX ADMIN — ACETAMINOPHEN 500 MG: 500 TABLET, FILM COATED ORAL at 02:10

## 2020-10-26 RX ADMIN — KETOROLAC TROMETHAMINE 15 MG: 15 INJECTION, SOLUTION INTRAMUSCULAR; INTRAVENOUS at 10:10

## 2020-10-26 RX ADMIN — METHOCARBAMOL TABLETS 500 MG: 500 TABLET, COATED ORAL at 09:10

## 2020-10-26 RX ADMIN — Medication 10 ML: at 11:10

## 2020-10-26 RX ADMIN — METHOCARBAMOL TABLETS 500 MG: 500 TABLET, COATED ORAL at 08:10

## 2020-10-26 RX ADMIN — Medication 10 ML: at 06:10

## 2020-10-26 RX ADMIN — METHOCARBAMOL TABLETS 500 MG: 500 TABLET, COATED ORAL at 05:10

## 2020-10-26 RX ADMIN — MUPIROCIN: 20 OINTMENT TOPICAL at 09:10

## 2020-10-26 RX ADMIN — MICONAZOLE NITRATE: 20 OINTMENT TOPICAL at 09:10

## 2020-10-26 RX ADMIN — METOPROLOL SUCCINATE 50 MG: 50 TABLET, EXTENDED RELEASE ORAL at 09:10

## 2020-10-26 RX ADMIN — ACETAMINOPHEN 500 MG: 500 TABLET, FILM COATED ORAL at 08:10

## 2020-10-26 RX ADMIN — BISACODYL 5 MG: 5 TABLET, COATED ORAL at 09:10

## 2020-10-26 RX ADMIN — ACETAMINOPHEN 500 MG: 500 TABLET, FILM COATED ORAL at 09:10

## 2020-10-26 RX ADMIN — METHOCARBAMOL TABLETS 500 MG: 500 TABLET, COATED ORAL at 02:10

## 2020-10-26 RX ADMIN — ENOXAPARIN SODIUM 40 MG: 40 INJECTION SUBCUTANEOUS at 08:10

## 2020-10-26 RX ADMIN — MEMANTINE 10 MG: 10 TABLET ORAL at 09:10

## 2020-10-26 RX ADMIN — MICONAZOLE NITRATE: 20 OINTMENT TOPICAL at 08:10

## 2020-10-26 RX ADMIN — DULOXETINE 60 MG: 30 CAPSULE, DELAYED RELEASE ORAL at 09:10

## 2020-10-26 RX ADMIN — MEMANTINE 10 MG: 10 TABLET ORAL at 08:10

## 2020-10-26 RX ADMIN — PRAVASTATIN SODIUM 40 MG: 40 TABLET ORAL at 08:10

## 2020-10-26 RX ADMIN — FUROSEMIDE 40 MG: 40 TABLET ORAL at 09:10

## 2020-10-26 RX ADMIN — PANTOPRAZOLE SODIUM 40 MG: 40 TABLET, DELAYED RELEASE ORAL at 06:10

## 2020-10-26 RX ADMIN — CLOPIDOGREL 75 MG: 75 TABLET, FILM COATED ORAL at 09:10

## 2020-10-26 RX ADMIN — FERROUS SULFATE TAB 325 MG (65 MG ELEMENTAL FE) 325 MG: 325 (65 FE) TAB at 08:10

## 2020-10-26 RX ADMIN — ENOXAPARIN SODIUM 40 MG: 40 INJECTION SUBCUTANEOUS at 09:10

## 2020-10-26 RX ADMIN — DONEPEZIL HYDROCHLORIDE 10 MG: 5 TABLET, FILM COATED ORAL at 08:10

## 2020-10-26 RX ADMIN — LISINOPRIL 10 MG: 10 TABLET ORAL at 09:10

## 2020-10-26 RX ADMIN — ASPIRIN 81 MG: 81 TABLET, COATED ORAL at 09:10

## 2020-10-26 RX ADMIN — VANCOMYCIN HYDROCHLORIDE 1500 MG: 1.5 INJECTION, POWDER, LYOPHILIZED, FOR SOLUTION INTRAVENOUS at 11:10

## 2020-10-26 RX ADMIN — ISOSORBIDE MONONITRATE 60 MG: 60 TABLET, EXTENDED RELEASE ORAL at 09:10

## 2020-10-26 NOTE — ASSESSMENT & PLAN NOTE
Was walking with walker prior to pneumonia/bacteremia admit last month then after d.c was only w/c bound (gonzalez round) will add pt here and try and get her as strong as poss as she lives at home with family.   10/23 Chronic co back ache but also has recent high ankle fx ; omar Vinson NP Spoke with Ariela VASQUES who will see patient while on SWING. She looked over x rays and hx and reports that patient can weight bear as tolerated. Nurse/OT/MD notified    · 10/26 Supine to Sit: maximal assistance, of 2 persons and patient able to reach with UE to push through rail and with improved push off with UE and initiation  · Sit to Supine: maximal assistance of 2 people with improved ability of patient to control descent of upper body  · Transfers:     · Sit to Stand:  moderate assistance, maximal assistance of 2 persons with no AD.  PT and PT tech blocking both knees while assisting pt to elevate hips from bed  Balance: pt able to stand x15 seconds EOB limited largely by knee and back pain.

## 2020-10-26 NOTE — PT/OT/SLP PROGRESS
Occupational Therapy   Treatment    Name: Martina Betancourt  MRN: [id number]  Admitting Diagnosis:  Debility       Recommendations:     Discharge Recommendations: nursing facility, skilled  Discharge Equipment Recommendations:  lift device  Barriers to discharge:       Assessment:     Martina Betancourt is a 70 y.o. female with a medical diagnosis of Debility.  She presents with improved participation in bed mobility tasks. Patient completing supine to sit with Mod-Max A x 1 person, noted significant improvement. Patient able to complete grooming with Min A sitting EOB but noted to still have poor tolerance of pain while sitting EOB. Patient continues to benefit from skilled OT services at this time.     Performance deficits affecting function are weakness, gait instability, decreased upper extremity function, impaired cardiopulmonary response to activity, impaired endurance, impaired balance, decreased lower extremity function, decreased safety awareness, impaired self care skills, impaired cognition, pain, impaired functional mobilty.     Rehab Prognosis:  Fair; patient would benefit from acute skilled OT services to address these deficits and reach maximum level of function.       Plan:     Patient to be seen 5 x/week to address the above listed problems via self-care/home management, therapeutic activities, therapeutic exercises  · Plan of Care Expires: 11/04/20  · Plan of Care Reviewed with: patient    Subjective     Pain/Comfort:  · Pain Rating 1: 9/10  · Location - Orientation 1: lower  · Location 1: back  · Pain Addressed 1: Pre-medicate for activity, Reposition  · Pain Rating Post-Intervention 1: 9/10    Objective:     Communicated with: Nursing prior to session.  Patient found supine with PureWick, oxygen upon OT entry to room.    General Precautions: Standard, fall   Orthopedic Precautions:Full weight bearing   Braces: N/A     Occupational Performance:     Bed Mobility:    · Patient completed Rolling/Turning to  Right with moderate assistance  · Patient completed Scooting/Bridging with total assistance  · Patient completed Supine to Sit with moderate assistance and maximal assistance  · Patient completed Sit to Supine with moderate assistance and maximal assistance     Functional Mobility/Transfers:  · Not completed    Activities of Daily Living:  · Grooming: minimum assistance sitting EOB to wash face and comb hair      AMPA 6 Click ADL: 10    Treatment & Education:  Therapist educated patient on sequencing for bed mobility and body mechanics to increase independence with improvement noted, assistance of only 1 required, sitting EOB x 10 minutes with supervision while completing grooming, poor tolerance of pain in sitting position    Patient left supine with all lines intact, call button in reach and nursing notifiedEducation:      GOALS:   Multidisciplinary Problems     Occupational Therapy Goals        Problem: Occupational Therapy Goal    Goal Priority Disciplines Outcome Interventions   Occupational Therapy Goal     OT, PT/OT Ongoing, Progressing    Description: Goals to be met by: 11/4/2020     Patient will increase functional independence with ADLs by performing:    Feeding with Supervision.  UE Dressing with Supervision.  LE Dressing with Moderate Assistance.  Grooming while seated with Supervision.  Toileting from bedside commode with Minimal Assistance for hygiene and clothing management.   Sitting at edge of bed x5 minutes with Supervision.  Rolling to Bilateral with Supervision.   Supine to sit with Minimal Assistance.  Squat pivot transfers with Minimal Assistance.  Toilet transfer to bedside commode with Minimal Assistance.  Upper extremity exercise program x10 reps per handout, with assistance as needed.                     Time Tracking:     OT Date of Treatment: 10/26/20  OT Start Time: 1100  OT Stop Time: 1128  OT Total Time (min): 28 min    Billable Minutes:Self Care/Home Management 28 minutes    Golden  Dhruv OT  10/26/2020

## 2020-10-26 NOTE — PROGRESS NOTES
[facility] Hospital Medicine  Progress Note    Patient Name: Martina Betancourt  MRN: [id number]  Patient Class: IP- Swing   Admission Date: 10/20/2020  Length of Stay: 6 days  Attending Physician: Layne Abbott MD  Primary Care Provider: Joe Fuller Iii, MD        Subjective:     Principal Problem:Debility        HPI:  69yo female patient with hx of alzheiners, CAD, HLD, HTN, myopathy, MI, obesity, sleep apnea and OA (knees non ambulatory uses gonzalez round). She was living at home with her daughter. Recently admitted to [facility] with MRSA bacteremia and subsequent pneumonia treated with Zyvox x 7 days with clearance of her bacteremia now admitted with back pain found to have T9-10 osteo discitis, T8-10 epidural phlegmon with associated paraverterbral abscesses. Spine infection likely hematogenous from under treated bacteremia. Neurosurgery has been consulted. She has been on Vancomycin and Ceftriaxone. Underwent T9-10 disc space biopsy with IR on 10/16. Cultures negative, though had been on IV antibiotics multiple days prior. Afebrile. HDS. Repeat blood cx sterile. ID rec cont vanc 1750mg q 24 till 12/3.     Overview/Hospital Course:  10/23 Here for skilled ; needing IV abx for spinal abscess;  vanc 1750mg q 24 till 12/3  Afebrile , 3LNC - O2 sat 95%   + debility, very deconditioned; bilt LE x 10 reps followed by bed mobility trng and static sit balance and tolerance at side of the bed  C/o back pain with activity; Occupational therapist notes that she is very resistant to movement and c/o severe pain with minimal activity.   Will order toradol 15mg x 1dose IV; pt did much better after toradol rx today per OT. Will order daily prior to OT as tolerated  Monitor renal fx     10/26 here for skilled therapy and cont IV abc. Vanc 1750mg iv every 12hr. Noted elevated WBC this am and she report that she has had diarrhea for the last 4 days.   · PT reports Supine to Sit: maximal assistance, of 2 persons and  patient able to reach with UE to push through rail and with improved push off with UE and initiation  · Sit to Supine: maximal assistance of 2 people with improved ability of patient to control descent of upper body  · Transfers:     · Sit to Stand:  moderate assistance, maximal assistance of 2 persons with no AD.  PT and PT tech blocking both knees while assisting pt to elevate hips from bed  Balance: pt able to stand x15 seconds EOB limited largely by knee and back pain.       Review of Systems   Constitutional: Negative for activity change, fatigue, fever and unexpected weight change.   HENT: Negative for congestion, ear pain, hearing loss, rhinorrhea and sore throat.    Eyes: Negative for redness and visual disturbance.   Respiratory: Negative for cough, shortness of breath and wheezing.    Cardiovascular: Negative for chest pain, palpitations and leg swelling.   Gastrointestinal: Positive for diarrhea (watery, non-bloody). Negative for abdominal pain, constipation, nausea and vomiting.   Genitourinary: Negative for dysuria, frequency and urgency.   Musculoskeletal: Negative for back pain, joint swelling and neck pain.   Skin: Negative for color change, rash and wound.   Neurological: Negative for dizziness, tremors, weakness, light-headedness and headaches.     Objective:     Vital Signs (Most Recent):  Temp: 98 °F (36.7 °C) (10/26/20 0800)  Pulse: 70 (10/26/20 0800)  Resp: 20 (10/26/20 0800)  BP: (!) 141/78 (10/26/20 0800)  SpO2: (!) 94 % (10/26/20 0800) Vital Signs (24h Range):  Temp:  [98 °F (36.7 °C)-98.6 °F (37 °C)] 98 °F (36.7 °C)  Pulse:  [70-74] 70  Resp:  [18-20] 20  SpO2:  [94 %-96 %] 94 %  BP: (132-141)/(60-78) 141/78     Weight: 136 kg (299 lb 13.2 oz)  Body mass index is 43.02 kg/m².    Physical Exam  Vitals signs and nursing note reviewed.   Constitutional:       General: She is not in acute distress.     Appearance: She is well-developed.   HENT:      Head: Normocephalic and atraumatic.       Right Ear: External ear normal.      Left Ear: External ear normal.      Nose: Nose normal.      Mouth/Throat:      Mouth: Mucous membranes are moist.      Pharynx: Oropharynx is clear.   Eyes:      General:         Right eye: No discharge.         Left eye: No discharge.      Extraocular Movements: Extraocular movements intact.      Conjunctiva/sclera: Conjunctivae normal.      Pupils: Pupils are equal, round, and reactive to light.   Neck:      Musculoskeletal: Neck supple.      Thyroid: No thyromegaly.   Cardiovascular:      Rate and Rhythm: Normal rate and regular rhythm.      Heart sounds: No murmur.   Pulmonary:      Effort: Pulmonary effort is normal. No respiratory distress.      Breath sounds: Normal breath sounds. No wheezing or rales.   Abdominal:      General: Bowel sounds are normal. There is no distension.      Palpations: Abdomen is soft.      Tenderness: There is no abdominal tenderness.   Lymphadenopathy:      Cervical: No cervical adenopathy.   Skin:     General: Skin is warm and dry.   Neurological:      Mental Status: She is alert and oriented to person, place, and time.      Cranial Nerves: No cranial nerve deficit.   Psychiatric:         Behavior: Behavior normal.           CRANIAL NERVES     CN III, IV, VI   Pupils are equal, round, and reactive to light.       Significant Labs:   CBC:   Recent Labs   Lab 10/26/20  0555   WBC 12.90*   HGB 9.9*   HCT 34.2*        CMP:   Recent Labs   Lab 10/26/20  0555      K 3.9   CL 99   CO2 32*   GLU 84   BUN 18   CREATININE 0.8   CALCIUM 9.7   ANIONGAP 9   EGFRNONAA >60     10/23 vanc trough 20.5    Body Fluid Type  Other (Specify)    Comment: T9-T10   Fluid Appearance  Clear    Fluid Color  Icteric    WBC, Body Fluid /cu mm 10    Comment: Reference ranges for body fluids not established.   Correlate clinically.    Segs, Fluid % 65    Lymphs, Fluid % 23    Monocytes/Macrophages, Fluid % 12    Aerobic culture no growth   Fungal culture in  process  10/8 blood culture NGTD x 5    9/23 METHICILLIN RESISTANT STAPHYLOCOCCUS AUREUS  Clindamycin <=0.5 mcg/mL Sensitive      Erythromycin <=0.5 mcg/mL Sensitive     Oxacillin >2 mcg/mL Resistant     Penicillin 8 mcg/mL Resistant     Tetracycline <=4 mcg/mL Sensitive     Trimeth/Sulfa <=0.5/9.5 m... Sensitive     Vancomycin 2 mcg/mL Sensitive      Significant Imaging:     10/19 CXR There is cardiomegaly.  Lungs are clear.  Central line tip upper SVC    10/13 CT lumbar   Findings above consistent with T9-T10 discitis/osteomyelitis with multilocular paravertebral abscesses, better evaluated on MRI 10/12/2020.  No significant retropulsion osseous fragments into the canal.     Multilevel lumbar spondylosis resulting in moderate to severe spinal canal stenosis from L2-L3 through L4-L5 and severe bilateral neural foraminal narrowing at L5-S1.     Mild multilevel thoracic spondylosis, detailed above.     Bilateral pleural effusions, larger on the left with compressive atelectasis/volume loss of adjacent lung parenchyma.     Moderate-large size hiatal hernia     Multi-vessel coronary artery atherosclerosis.     10/13 CT thoracic spine   Findings above consistent with T9-T10 discitis/osteomyelitis with multilocular paravertebral abscesses, better evaluated on MRI 10/12/2020.  No significant retropulsion osseous fragments into the canal.     Multilevel lumbar spondylosis resulting in moderate to severe spinal canal stenosis from L2-L3 through L4-L5 and severe bilateral neural foraminal narrowing at L5-S1.     Mild multilevel thoracic spondylosis, detailed above.     Bilateral pleural effusions, larger on the left with compressive atelectasis/volume loss of adjacent lung parenchyma.     Moderate-large size hiatal hernia     Multi-vessel coronary artery atherosclerosis.    10/12 MRI thoracic Discitis/osteomyelitis at the T9-T10 level with multilocular paravertebral abscesses similar to prior exam.     T8-T10 epidural  phlegmon results in moderate narrowing of the thecal sac. There is associated nerve root enhancement, which may be seen with arachnoiditis.     Bilateral complex pleural effusions with enhancement.  Empyema is a consideration.  Consider further evaluation with contrast enhanced chest CT.     Multilevel mild degenerative changes and disc bulges in the thoracic spine, most prominent at T5-T6.    10/12 MRI lumbar No evidence for spondylodiscitis.     Bilateral L5 spondylolysis with grade 2 anterolisthesis.     Multilevel degenerative changes of the lumbar spine detailed above.  Moderate to severe spinal canal stenosis noted at L2-L5.  Severe neural foraminal narrowing noted at L5-S1.    9/23 EKG Normal sinus rhythm  Nonspecific ST and T wave abnormality  Abnormal ECG  When compared with ECG of 10-AUG-2020 06:42,  Nonspecific T wave abnormality now evident in Lateral leads  Confirmed by Willie Matthews MD ([id number]) on 9/23/2020 3:05:10 PM      Assessment/Plan:      * Debility  Was walking with walker prior to pneumonia/bacteremia admit last month then after d.c was only w/c bound (gonzalez round) will add pt here and try and get her as strong as poss as she lives at home with family.   10/23 Chronic co back ache but also has recent high ankle fx ; per EDDIE Vinson NP Spoke with Ariela VASQUES who will see patient while on SWING. She looked over x rays and hx and reports that patient can weight bear as tolerated. Nurse/OT/MD notified    · 10/26 Supine to Sit: maximal assistance, of 2 persons and patient able to reach with UE to push through rail and with improved push off with UE and initiation  · Sit to Supine: maximal assistance of 2 people with improved ability of patient to control descent of upper body  · Transfers:     · Sit to Stand:  moderate assistance, maximal assistance of 2 persons with no AD.  PT and PT tech blocking both knees while assisting pt to elevate hips from bed  Balance: pt able to stand x15 seconds  EOB limited largely by knee and back pain.       Diarrhea  Check stools today      Malnutrition of mild degree        Hydronephrosis  Cont asa, plavix, lasix, isosorbide, lisinopril, toprol and statin      CAD (coronary artery disease)  Cont asa, plavix, lasix, isosorbide, lisinopril, toprol and statin      HTN (hypertension)  Increase lisinopril 2.5>10mg daily  10/23 SBP 160s at times; lisinopril just increased will give more time for lisinopril to work before titration  10/26 SBP 140s; cont to monitor      Discitis  vanc 1450mg IV every 24hr x 8 weeks total till 12/3 per ID ; will stay here for the duration   PT    Severe obesity (BMI >= 40)        Obstructive sleep apnea treated with continuous positive airway pressure (CPAP)  Bring home cpap      Dementia without behavioral disturbance  Cont namenda and aricpet         VTE Risk Mitigation (From admission, onward)         Ordered     enoxaparin injection 40 mg  Every 12 hours      10/20/20 2147                Discharge Planning   BRIT:      Code Status: DNR   Is the patient medically ready for discharge?:     Reason for patient still in hospital (select all that apply): Patient trending condition, Laboratory test, Treatment and PT / OT recommendations  Discharge Plan A: Home with family, Home Health                  Caleb Elizabeth MD  Department of Hospital Medicine   [facility]

## 2020-10-26 NOTE — PT/OT/SLP PROGRESS
Physical Therapy Treatment    Patient Name:  Martina Betancourt   MRN:  [id number]    Recommendations:     Discharge Recommendations:  nursing facility, skilled   Discharge Equipment Recommendations: lift device   Barriers to discharge: None    Assessment:     Martina Betancourt is a 70 y.o. female admitted with a medical diagnosis of Debility.  She presents with the following impairments/functional limitations:  weakness, impaired endurance, gait instability, decreased lower extremity function, decreased upper extremity function, pain, decreased ROM, impaired functional mobilty, impaired self care skills. Patient increased initiation on bed mobility reaching for bed rail and pulling for rolling to sides and pushing up with UE upon supine to sit.  Able to perform sit to stand  With max Assistance x 1 and cues  using Std Walker infront at squat position for ~7 seconds tolerance for 1 trial attempt. Unable to perform on the 2nd trial due to fatigue easily.    Rehab Prognosis: Fair; patient would benefit from acute skilled PT services to address these deficits and reach maximum level of function.    Recent Surgery: * No surgery found *      Plan:     During this hospitalization, patient to be seen daily to address the identified rehab impairments via gait training, therapeutic activities, therapeutic exercises and progress toward the following goals:    · Plan of Care Expires:  11/10/20    Subjective     Chief Complaint: lower back pain and fatigue easily  Patient/Family Comments/goals: decrease pain and do better  Pain/Comfort:  · Pain Rating 1: 7/10  · Location - Side 1: Bilateral  · Location - Orientation 1: lower  · Location 1: back  · Pain Addressed 1: Reposition  · Pain Rating Post-Intervention 1: 6/10      Objective:     Communicated with patient prior to session.  Patient found supine with PureWick, oxygen upon PT entry to room.     General Precautions: Standard, fall   Orthopedic Precautions:Full weight bearing    Braces: N/A     Functional Mobility:  · Bed Mobility:     · Rolling Left:  maximal assistance  · Rolling Right: maximal assistance  · Supine to Sit: maximal assistance x 1 and cues  · Sit to Supine: maximal assistance x 2 and cues  · Transfers:     · Sit to Stand:  Maximal assistance and cues using std walker for support with standard walker  · Balance: Static Stand(squat position) using std walker x ~7 seconds with max assistance and cues with Poor grade      AM-PAC 6 CLICK MOBILITY  Turning over in bed (including adjusting bedclothes, sheets and blankets)?: 2  Sitting down on and standing up from a chair with arms (e.g., wheelchair, bedside commode, etc.): 2  Moving from lying on back to sitting on the side of the bed?: 2  Moving to and from a bed to a chair (including a wheelchair)?: 1  Need to walk in hospital room?: 1  Climbing 3-5 steps with a railing?: 1  Basic Mobility Total Score: 9       Therapeutic Activities and Exercises:   Worked on body sequence on bed mobility, sitting balance and sit to stand transfer trng with std walker; Static stand balance and tolerance trng using Std Walker; Performed bilat LE strengthening and coordination ex followed by rolling to side ex uinsg bed rail.    Patient left supine with all lines intact, call button in reach, bed alarm on and nursing notified..    GOALS:   Multidisciplinary Problems     Physical Therapy Goals        Problem: Physical Therapy Goal    Goal Priority Disciplines Outcome Goal Variances Interventions   Physical Therapy Goal     PT, PT/OT Ongoing, Progressing     Description: Goals to be met by: 11/10/20    Patient will increase functional independence with mobility by performin. Rolling to sides using bed rail with contact guard assistance and cues  2. Supine to sit with minimal assistance and cues  3. Sit to supine with moderate assistance and cues  4. Tolerate Static Sit at side of the bed for 10 minutes with Standby Assistance  5. Bed to  chair transfer with moderate assistance and cues using Slide Board TECHNIQUE  6. Lower extremity exercise program x15 reps per handout, with assistance as needed                      Time Tracking:     PT Received On: 10/26/20  PT Start Time: 1310     PT Stop Time: 1347  PT Total Time (min): 37 min     Billable Minutes: Therapeutic Activity 15 and Therapeutic Exercise 20    Treatment Type: Treatment  PT/PTA: PT     PTA Visit Number: 0     Edgar Lamas, PT  10/26/2020

## 2020-10-26 NOTE — SUBJECTIVE & OBJECTIVE
Review of Systems   Constitutional: Negative for activity change, fatigue, fever and unexpected weight change.   HENT: Negative for congestion, ear pain, hearing loss, rhinorrhea and sore throat.    Eyes: Negative for redness and visual disturbance.   Respiratory: Negative for cough, shortness of breath and wheezing.    Cardiovascular: Negative for chest pain, palpitations and leg swelling.   Gastrointestinal: Positive for diarrhea (watery, non-bloody). Negative for abdominal pain, constipation, nausea and vomiting.   Genitourinary: Negative for dysuria, frequency and urgency.   Musculoskeletal: Negative for back pain, joint swelling and neck pain.   Skin: Negative for color change, rash and wound.   Neurological: Negative for dizziness, tremors, weakness, light-headedness and headaches.     Objective:     Vital Signs (Most Recent):  Temp: 98 °F (36.7 °C) (10/26/20 0800)  Pulse: 70 (10/26/20 0800)  Resp: 20 (10/26/20 0800)  BP: (!) 141/78 (10/26/20 0800)  SpO2: (!) 94 % (10/26/20 0800) Vital Signs (24h Range):  Temp:  [98 °F (36.7 °C)-98.6 °F (37 °C)] 98 °F (36.7 °C)  Pulse:  [70-74] 70  Resp:  [18-20] 20  SpO2:  [94 %-96 %] 94 %  BP: (132-141)/(60-78) 141/78     Weight: 136 kg (299 lb 13.2 oz)  Body mass index is 43.02 kg/m².    Physical Exam  Vitals signs and nursing note reviewed.   Constitutional:       General: She is not in acute distress.     Appearance: She is well-developed.   HENT:      Head: Normocephalic and atraumatic.      Right Ear: External ear normal.      Left Ear: External ear normal.      Nose: Nose normal.      Mouth/Throat:      Mouth: Mucous membranes are moist.      Pharynx: Oropharynx is clear.   Eyes:      General:         Right eye: No discharge.         Left eye: No discharge.      Extraocular Movements: Extraocular movements intact.      Conjunctiva/sclera: Conjunctivae normal.      Pupils: Pupils are equal, round, and reactive to light.   Neck:      Musculoskeletal: Neck supple.       Thyroid: No thyromegaly.   Cardiovascular:      Rate and Rhythm: Normal rate and regular rhythm.      Heart sounds: No murmur.   Pulmonary:      Effort: Pulmonary effort is normal. No respiratory distress.      Breath sounds: Normal breath sounds. No wheezing or rales.   Abdominal:      General: Bowel sounds are normal. There is no distension.      Palpations: Abdomen is soft.      Tenderness: There is no abdominal tenderness.   Lymphadenopathy:      Cervical: No cervical adenopathy.   Skin:     General: Skin is warm and dry.   Neurological:      Mental Status: She is alert and oriented to person, place, and time.      Cranial Nerves: No cranial nerve deficit.   Psychiatric:         Behavior: Behavior normal.           CRANIAL NERVES     CN III, IV, VI   Pupils are equal, round, and reactive to light.       Significant Labs:   CBC:   Recent Labs   Lab 10/26/20  0555   WBC 12.90*   HGB 9.9*   HCT 34.2*        CMP:   Recent Labs   Lab 10/26/20  0555      K 3.9   CL 99   CO2 32*   GLU 84   BUN 18   CREATININE 0.8   CALCIUM 9.7   ANIONGAP 9   EGFRNONAA >60     10/23 vanc trough 20.5    Body Fluid Type  Other (Specify)    Comment: T9-T10   Fluid Appearance  Clear    Fluid Color  Icteric    WBC, Body Fluid /cu mm 10    Comment: Reference ranges for body fluids not established.   Correlate clinically.    Segs, Fluid % 65    Lymphs, Fluid % 23    Monocytes/Macrophages, Fluid % 12    Aerobic culture no growth   Fungal culture in process  10/8 blood culture NGTD x 5    9/23 METHICILLIN RESISTANT STAPHYLOCOCCUS AUREUS  Clindamycin <=0.5 mcg/mL Sensitive      Erythromycin <=0.5 mcg/mL Sensitive     Oxacillin >2 mcg/mL Resistant     Penicillin 8 mcg/mL Resistant     Tetracycline <=4 mcg/mL Sensitive     Trimeth/Sulfa <=0.5/9.5 m... Sensitive     Vancomycin 2 mcg/mL Sensitive      Significant Imaging:     10/19 CXR There is cardiomegaly.  Lungs are clear.  Central line tip upper SVC    10/13 CT lumbar   Findings  above consistent with T9-T10 discitis/osteomyelitis with multilocular paravertebral abscesses, better evaluated on MRI 10/12/2020.  No significant retropulsion osseous fragments into the canal.     Multilevel lumbar spondylosis resulting in moderate to severe spinal canal stenosis from L2-L3 through L4-L5 and severe bilateral neural foraminal narrowing at L5-S1.     Mild multilevel thoracic spondylosis, detailed above.     Bilateral pleural effusions, larger on the left with compressive atelectasis/volume loss of adjacent lung parenchyma.     Moderate-large size hiatal hernia     Multi-vessel coronary artery atherosclerosis.     10/13 CT thoracic spine   Findings above consistent with T9-T10 discitis/osteomyelitis with multilocular paravertebral abscesses, better evaluated on MRI 10/12/2020.  No significant retropulsion osseous fragments into the canal.     Multilevel lumbar spondylosis resulting in moderate to severe spinal canal stenosis from L2-L3 through L4-L5 and severe bilateral neural foraminal narrowing at L5-S1.     Mild multilevel thoracic spondylosis, detailed above.     Bilateral pleural effusions, larger on the left with compressive atelectasis/volume loss of adjacent lung parenchyma.     Moderate-large size hiatal hernia     Multi-vessel coronary artery atherosclerosis.    10/12 MRI thoracic Discitis/osteomyelitis at the T9-T10 level with multilocular paravertebral abscesses similar to prior exam.     T8-T10 epidural phlegmon results in moderate narrowing of the thecal sac. There is associated nerve root enhancement, which may be seen with arachnoiditis.     Bilateral complex pleural effusions with enhancement.  Empyema is a consideration.  Consider further evaluation with contrast enhanced chest CT.     Multilevel mild degenerative changes and disc bulges in the thoracic spine, most prominent at T5-T6.    10/12 MRI lumbar No evidence for spondylodiscitis.     Bilateral L5 spondylolysis with grade 2  anterolisthesis.     Multilevel degenerative changes of the lumbar spine detailed above.  Moderate to severe spinal canal stenosis noted at L2-L5.  Severe neural foraminal narrowing noted at L5-S1.    9/23 EKG Normal sinus rhythm  Nonspecific ST and T wave abnormality  Abnormal ECG  When compared with ECG of 10-AUG-2020 06:42,  Nonspecific T wave abnormality now evident in Lateral leads  Confirmed by Willie Matthews MD ([id number]) on 9/23/2020 3:05:10 PM

## 2020-10-26 NOTE — PLAN OF CARE
Patient Agrees and Compliant with Plan of Care;  Maintain Pain Regimen; Hydrocodone given X 1 this shift for c/o back pain with relief.  Monitor Breathing Pattern; No c/o shortness of breath this shift; Patient on Oxygen 3L nc maintaining oxygen saturation of 95%  Maintain I/O's Patient with Pur Wick external cath. Draining clear  yellow urine.  Administer IV Antibiotic as oredered; Patient receiving Vancomycin 1750mg Daily.  Monitor Vancomycin levels ; Next level to be drawn on 10-26-20 at 10PM.  Monitor Skin integrity; Note excoriation to buttocks area ; Barrier cream to area.  Monitor PICC in Upper right arm; Note red port not flushing.;  Fall Precautions; Maintain Patient Safety; No falls or injury noted this shift. Patient encouraging to turn self.

## 2020-10-26 NOTE — ASSESSMENT & PLAN NOTE
Increase lisinopril 2.5>10mg daily  10/23 SBP 160s at times; lisinopril just increased will give more time for lisinopril to work before titration  10/26 SBP 140s; cont to monitor

## 2020-10-27 LAB
BASOPHILS # BLD AUTO: 0.03 K/UL (ref 0–0.2)
BASOPHILS NFR BLD: 0.3 % (ref 0–1.9)
DIFFERENTIAL METHOD: ABNORMAL
EOSINOPHIL # BLD AUTO: 0.3 K/UL (ref 0–0.5)
EOSINOPHIL NFR BLD: 3.4 % (ref 0–8)
ERYTHROCYTE [DISTWIDTH] IN BLOOD BY AUTOMATED COUNT: 15.7 % (ref 11.5–14.5)
HCT VFR BLD AUTO: 33.2 % (ref 37–48.5)
HGB BLD-MCNC: 9.7 G/DL (ref 12–16)
IMM GRANULOCYTES # BLD AUTO: 0.04 K/UL (ref 0–0.04)
IMM GRANULOCYTES NFR BLD AUTO: 0.4 % (ref 0–0.5)
LYMPHOCYTES # BLD AUTO: 1.5 K/UL (ref 1–4.8)
LYMPHOCYTES NFR BLD: 14.9 % (ref 18–48)
MCH RBC QN AUTO: 26.5 PG (ref 27–31)
MCHC RBC AUTO-ENTMCNC: 29.2 G/DL (ref 32–36)
MCV RBC AUTO: 91 FL (ref 82–98)
MONOCYTES # BLD AUTO: 1 K/UL (ref 0.3–1)
MONOCYTES NFR BLD: 10.2 % (ref 4–15)
NEUTROPHILS # BLD AUTO: 6.9 K/UL (ref 1.8–7.7)
NEUTROPHILS NFR BLD: 70.8 % (ref 38–73)
NRBC BLD-RTO: 0 /100 WBC
PLATELET # BLD AUTO: 304 K/UL (ref 150–350)
PMV BLD AUTO: 10 FL (ref 9.2–12.9)
RBC # BLD AUTO: 3.66 M/UL (ref 4–5.4)
WBC # BLD AUTO: 9.71 K/UL (ref 3.9–12.7)
WBC #/AREA STL HPF: NORMAL /[HPF]

## 2020-10-27 PROCEDURE — 63600175 PHARM REV CODE 636 W HCPCS: Performed by: INTERNAL MEDICINE

## 2020-10-27 PROCEDURE — 27000221 HC OXYGEN, UP TO 24 HOURS

## 2020-10-27 PROCEDURE — 11000004 HC SNF PRIVATE

## 2020-10-27 PROCEDURE — 85025 COMPLETE CBC W/AUTO DIFF WBC: CPT

## 2020-10-27 PROCEDURE — 25000003 PHARM REV CODE 250: Performed by: INTERNAL MEDICINE

## 2020-10-27 PROCEDURE — 97530 THERAPEUTIC ACTIVITIES: CPT

## 2020-10-27 PROCEDURE — 25000003 PHARM REV CODE 250: Performed by: NURSE PRACTITIONER

## 2020-10-27 PROCEDURE — 97110 THERAPEUTIC EXERCISES: CPT

## 2020-10-27 PROCEDURE — 94761 N-INVAS EAR/PLS OXIMETRY MLT: CPT

## 2020-10-27 PROCEDURE — 36415 COLL VENOUS BLD VENIPUNCTURE: CPT

## 2020-10-27 PROCEDURE — A4216 STERILE WATER/SALINE, 10 ML: HCPCS | Performed by: INTERNAL MEDICINE

## 2020-10-27 PROCEDURE — 97535 SELF CARE MNGMENT TRAINING: CPT

## 2020-10-27 PROCEDURE — 94799 UNLISTED PULMONARY SVC/PX: CPT

## 2020-10-27 RX ADMIN — ENOXAPARIN SODIUM 40 MG: 40 INJECTION SUBCUTANEOUS at 09:10

## 2020-10-27 RX ADMIN — MEMANTINE 10 MG: 10 TABLET ORAL at 09:10

## 2020-10-27 RX ADMIN — METHOCARBAMOL TABLETS 500 MG: 500 TABLET, COATED ORAL at 06:10

## 2020-10-27 RX ADMIN — VANCOMYCIN HYDROCHLORIDE 1500 MG: 1.5 INJECTION, POWDER, LYOPHILIZED, FOR SOLUTION INTRAVENOUS at 10:10

## 2020-10-27 RX ADMIN — PANTOPRAZOLE SODIUM 40 MG: 40 TABLET, DELAYED RELEASE ORAL at 05:10

## 2020-10-27 RX ADMIN — DONEPEZIL HYDROCHLORIDE 10 MG: 5 TABLET, FILM COATED ORAL at 08:10

## 2020-10-27 RX ADMIN — METHOCARBAMOL TABLETS 500 MG: 500 TABLET, COATED ORAL at 09:10

## 2020-10-27 RX ADMIN — MICONAZOLE NITRATE: 20 OINTMENT TOPICAL at 09:10

## 2020-10-27 RX ADMIN — Medication 10 ML: at 06:10

## 2020-10-27 RX ADMIN — Medication 10 ML: at 05:10

## 2020-10-27 RX ADMIN — ACETAMINOPHEN 500 MG: 500 TABLET, FILM COATED ORAL at 09:10

## 2020-10-27 RX ADMIN — METHOCARBAMOL TABLETS 500 MG: 500 TABLET, COATED ORAL at 01:10

## 2020-10-27 RX ADMIN — FERROUS SULFATE TAB 325 MG (65 MG ELEMENTAL FE) 325 MG: 325 (65 FE) TAB at 08:10

## 2020-10-27 RX ADMIN — ISOSORBIDE MONONITRATE 60 MG: 60 TABLET, EXTENDED RELEASE ORAL at 09:10

## 2020-10-27 RX ADMIN — ASPIRIN 81 MG: 81 TABLET, COATED ORAL at 09:10

## 2020-10-27 RX ADMIN — Medication 10 ML: at 12:10

## 2020-10-27 RX ADMIN — ENOXAPARIN SODIUM 40 MG: 40 INJECTION SUBCUTANEOUS at 08:10

## 2020-10-27 RX ADMIN — ACETAMINOPHEN 500 MG: 500 TABLET, FILM COATED ORAL at 02:10

## 2020-10-27 RX ADMIN — MICONAZOLE NITRATE: 20 OINTMENT TOPICAL at 08:10

## 2020-10-27 RX ADMIN — PRAVASTATIN SODIUM 40 MG: 40 TABLET ORAL at 08:10

## 2020-10-27 RX ADMIN — ACETAMINOPHEN 500 MG: 500 TABLET, FILM COATED ORAL at 08:10

## 2020-10-27 RX ADMIN — MEMANTINE 10 MG: 10 TABLET ORAL at 08:10

## 2020-10-27 RX ADMIN — METHOCARBAMOL TABLETS 500 MG: 500 TABLET, COATED ORAL at 08:10

## 2020-10-27 RX ADMIN — METOPROLOL SUCCINATE 50 MG: 50 TABLET, EXTENDED RELEASE ORAL at 09:10

## 2020-10-27 RX ADMIN — FUROSEMIDE 40 MG: 40 TABLET ORAL at 09:10

## 2020-10-27 RX ADMIN — CLOPIDOGREL 75 MG: 75 TABLET, FILM COATED ORAL at 09:10

## 2020-10-27 RX ADMIN — LISINOPRIL 10 MG: 10 TABLET ORAL at 09:10

## 2020-10-27 RX ADMIN — HYDROCODONE BITARTRATE AND ACETAMINOPHEN 1 TABLET: 10; 325 TABLET ORAL at 06:10

## 2020-10-27 RX ADMIN — DULOXETINE 60 MG: 30 CAPSULE, DELAYED RELEASE ORAL at 09:10

## 2020-10-27 NOTE — PLAN OF CARE
Recommendation:   1. continue cardiac diet   2. encourage pt to increase intake of meals   3. Continue Boost Pudding TID for intake support  4. Consider appetite stimulant    Goals: consume at least 50% of meals by f/u  Nutrition Goal Status: progressing towards goal    Interventions(treatment strategy):  Sodium modified diet  Collaboration with other providers  Commercial food- Boost Pudding    Nutrition Discharge Planning: Low sodium diet

## 2020-10-27 NOTE — PROGRESS NOTES
Pharmacokinetic Assessment Follow Up: IV Vancomycin    Vancomycin serum concentration assessment(s):    The trough level was drawn correctly and can be used to guide therapy at this time. The measurement is above the desired definitive target range of 15 to 20 mcg/mL.    Vancomycin Regimen Plan:    Change regimen to Vancomycin 1500 mg IV every 24 hours with next serum trough concentration measured at 22:00 prior to 3rd  dose on 10-28    Drug levels (last 3 results):  Recent Labs   Lab Result Units 10/26/20  2154   Vancomycin-Trough ug/mL 21.2       Pharmacy will continue to follow and monitor vancomycin.    Please contact pharmacy at extension [phone] for questions regarding this assessment.    Thank you for the consult,   Christopher Rivera       Patient brief summary:  Martina Betancourt is a 70 y.o. female initiated on antimicrobial therapy with IV Vancomycin for treatment of bone/joint infection    The patient's current regimen is 1750 q24    Drug Allergies:   Review of patient's allergies indicates:   Allergen Reactions    Hydroxyzine hcl     Tizanidine        Actual Body Weight: 136 kg    Renal Function:   Estimated Creatinine Clearance: 95.8 mL/min (based on SCr of 0.8 mg/dL).,     Dialysis Method (if applicable):  N/A    CBC (last 72 hours):  Recent Labs   Lab Result Units 10/26/20  0555   WBC K/uL 12.90*   Hemoglobin g/dL 9.9*   Hematocrit % 34.2*   Platelets K/uL 285   Gran% % 72.3   Lymph% % 12.8*   Mono% % 12.0   Eosinophil% % 2.3   Basophil% % 0.2   Differential Method  Automated       Metabolic Panel (last 72 hours):  Recent Labs   Lab Result Units 10/26/20  0555   Sodium mmol/L 140   Potassium mmol/L 3.9   Chloride mmol/L 99   CO2 mmol/L 32*   Glucose mg/dL 84   BUN, Bld mg/dL 18   Creatinine mg/dL 0.8   Magnesium mg/dL 2.0   Phosphorus mg/dL 2.8       Vancomycin Administrations:  vancomycin given in the last 96 hours                     vancomycin 1750 mg in 0.9% sodium chloride 500 mL IVPB (mg) 1,750 mg New  Bag 10/25/20 2300     1,750 mg New Bag 10/24/20 2218     1,750 mg New Bag 10/23/20 2226                    Microbiologic Results:  Microbiology Results (last 7 days)       Procedure Component Value Units Date/Time    E. coli 0157 antigen [[id number]] Collected: 10/26/20 1827    Order Status: No result Specimen: Stool Updated: 10/26/20 2200    Stool culture [[id number]] Collected: 10/26/20 1827    Order Status: Sent Specimen: Stool Updated: 10/26/20 2200    Clostridium difficile EIA [[id number]] Collected: 10/26/20 1827    Order Status: Canceled Specimen: Stool

## 2020-10-27 NOTE — PT/OT/SLP PROGRESS
"Physical Therapy Treatment    Patient Name:  Martina Betancourt   MRN:  [id number]    Recommendations:     Discharge Recommendations:  nursing facility, skilled   Discharge Equipment Recommendations: lift device   Barriers to discharge: Decreased caregiver support    Assessment:     Martina Betancourt is a 70 y.o. female admitted with a medical diagnosis of Debility.  She presents with the following impairments/functional limitations:  weakness, impaired endurance, impaired cognition, decreased coordination, impaired coordination, impaired self care skills, impaired functional mobilty, decreased lower extremity function, gait instability, pain, impaired balance. Patient able to perform and  tolerate transfers bed to wheelchair using slide board with Max Assistance and cues and back to bed using slide board with Max Assistance x 2-3 and cues. Tolerated sitting up in the wheelchair for  ~2 hours while eating late lunch meal. Increased Static Stand tolerance inside the parallel bars in the Therapy Room for ~10 seconds with maxA and cues for 2 attempts. Fatigue easily and complain lower back pain.  Rehab Prognosis: Fair; patient would benefit from acute skilled PT services to address these deficits and reach maximum level of function.    Recent Surgery: * No surgery found *      Plan:     During this hospitalization, patient to be seen daily to address the identified rehab impairments via gait training, therapeutic activities, therapeutic exercises and progress toward the following goals:    · Plan of Care Expires:  11/10/20    Subjective     Chief Complaint: lower back pain during transitional movement  Patient/Family Comments/goals: "To do better"  Pain/Comfort:  · Pain Rating 1: 8/10  · Location - Side 1: Bilateral  · Location - Orientation 1: lower  · Location 1: back  · Pain Addressed 1: Reposition, Cessation of Activity  · Pain Rating Post-Intervention 1: 7/10      Objective:     Communicated with patient  prior to " session.  Patient found supine with PureWick, oxygen upon PT entry to room.     General Precautions: Standard, fall   Orthopedic Precautions:Full weight bearing   Braces: N/A     Functional Mobility:  · Bed Mobility:     · Rolling Left:  moderate assistance  · Rolling Right: moderate assistance  · Scooting: maximal assistance  · Supine to Sit: maximum/moderate assistace and cues  · Sit to Supine: maximum assistace x 1-2 and cues  · Transfers:     · Sit to Stand:  maximum assistance and cues inside the parallel bars with facilitation tech  · Bed to Chair: to wheelchair  with  maximum assistance and cues  using  Slide Board  · Balance: Standing Static inside the parallel bars with Poor grade. Tolerated for ~10 seconds with 2 attempts with  Maximum assistance and cues.      AM-PAC 6 CLICK MOBILITY  Turning over in bed (including adjusting bedclothes, sheets and blankets)?: 2  Sitting down on and standing up from a chair with arms (e.g., wheelchair, bedside commode, etc.): 2  Moving from lying on back to sitting on the side of the bed?: 2  Moving to and from a bed to a chair (including a wheelchair)?: 2  Need to walk in hospital room?: 1  Climbing 3-5 steps with a railing?: 1  Basic Mobility Total Score: 10       Therapeutic Activities and Exercises:   Worked on body sequence on Bed  Mobility, Transfer trng with sliding board, Balance trng and  tolerance at sitting on side of the bed and static stand inside the parallel bars, Bilat LE strengthening ROM ex x 10 reps on  each    Patient left sitting up in the wheelcahir for ~ 2 hours and assisted patient back to bed using sliding board with maxA x 2-3 and cues with all lines intact, call button in reach, bed alarm on, nursing notified and nursing and PCT present..    GOALS:   Multidisciplinary Problems     Physical Therapy Goals        Problem: Physical Therapy Goal    Goal Priority Disciplines Outcome Goal Variances Interventions   Physical Therapy Goal     PT, PT/OT  Ongoing, Progressing     Description: Goals to be met by: 11/10/20    Patient will increase functional independence with mobility by performin. Rolling to sides using bed rail with contact guard assistance and cues  2. Supine to sit with minimal assistance and cues  3. Sit to supine with moderate assistance and cues  4. Tolerate Static Sit at side of the bed for 10 minutes with Standby Assistance  5. Bed to chair transfer with moderate assistance and cues using Slide Board TECHNIQUE  6. Lower extremity exercise program x15 reps per handout, with assistance as needed                      Time Tracking:     PT Received On: 10/27/20  PT Start Time: 1330     PT Stop Time: 1405  PT Total Time (min): 35 min     Billable Minutes: Therapeutic Activity 20 and Therapeutic Exercise 15    Treatment Type: Treatment  PT/PTA: PT     PTA Visit Number: 0     Edgar Lamas, PT  10/27/2020

## 2020-10-27 NOTE — PLAN OF CARE
Pt AAOx2. Pt gets disoriented at time. Pt with visual hallucinations, states seeing a cat, but knows she is in the hospital. VSS. Pt maintained on IV vancomycin. Pt working with PT/OT. Occult stool came back postive. Dr. Agustin notified. No new orders received. Pt states having diarrhea so c diff was ordered, however, when sample collected pts stool was formed. Plan of care discussed with pt, all questions answered.

## 2020-10-27 NOTE — PLAN OF CARE
Patient here as swing patient to work with PT. Patient is compliant with fluid and medication management. Tolerating IV antibiotics well. Able to take oral medications without difficulty. Patient is understanding and compliant with lab draws and procedures. Patient is free from falls and injury. VSS. Plan of care reviewed and agreed upon with patient. Will continue to monitor.

## 2020-10-27 NOTE — PROGRESS NOTES
"[facility]  Adult Nutrition  Progress Note    SUMMARY       Recommendations    Recommendation:   1. continue cardiac diet   2. encourage pt to increase intake of meals   3. Continue Boost Pudding TID for intake support  4. Consider appetite stimulant    Goals: consume at least 50% of meals by f/u  Nutrition Goal Status: progressing towards goal  Communication of RD Recs: reviewed with physician (POC)    Reason for Assessment    Reason For Assessment: RD follow-up  Diagnosis: (Debility)  Relevant Medical History: HTN, CAD, obesity, HLD, myopathy    General Information Comments:   10/20:Pt admitted for SWING. She consumed 25-50% of meals yesterday and 0% so far today (very little bites). Per chart review, pt with a 42lb (12.5%) significant wt loss in 3 months , and Pt states she has lost 60lbs since june. NFPE performed today, mild wasting found in temples and orbital region; pt meets criteria for moderate malnutrition.     10/27: Pt states her appettie is good and that she is eating all of her + Boost Pudding, but flowsheets show Meal intake is fluctuating from 0-50%. On IV abx. Positive for diarrhea. Stage 2 wound to right heel. Spoke with MD about nutritional needs, states they will monitor.     Nutrition Discharge Planning: Low sodium diet    Nutrition Risk Screen    Nutrition Risk Screen: no indicators present    Nutrition/Diet History    Spiritual, Cultural Beliefs, Sikhism Practices, Values that Affect Care: no  Food Allergies: NKFA  Factors Affecting Nutritional Intake: impaired cognitive status/motor control    Anthropometrics    Temp: 98 °F (36.7 °C)  Height Method: Stated  Height: 5' 10" (177.8 cm)  Height (inches): 70 in  Weight Method: Bed Scale  Weight: 135.9 kg (299 lb 9.7 oz)  Weight (lb): 299.61 lb  Ideal Body Weight (IBW), Female: 150 lb  % Ideal Body Weight, Female (lb): 195.47 %  BMI (Calculated): 43  BMI Grade: greater than 40 - morbid obesity  Usual Body Weight (UBW), kg: " (!) 156.8 kg  % Usual Body Weight: 85     Lab/Procedures/Meds    Pertinent Labs Reviewed: reviewed  Pertinent Labs Comments: CO2 32   Pertinent Medications Reviewed: reviewed  Pertinent Medications Comments: enoxaparin, ferrous sulfide, furosemide, lisinopril, pantoprazole, pravastatin, vancomycin    Estimated/Assessed Needs    Weight Used For Calorie Calculations: 133 kg (293 lb 3.4 oz)  Energy Calorie Requirements (kcal): 1920  Energy Need Method: [place]-St Jeor(no AF)  Protein Requirements: 106-133 g/d  Weight Used For Protein Calculations: 133 kg (293 lb 3.4 oz)     Estimated Fluid Requirement Method: RDA Method(or per MD)  RDA Method (mL): 1920     Nutrition Prescription Ordered    Current Diet Order: Cardiac diet    Evaluation of Received Nutrient/Fluid Intake    I/O: -6.8L since admit  Fluid Required: meeting needs  Comments: LBM 10/26  % Intake of Estimated Energy Needs: 25-50%  % Meal Intake: 0-50%    Nutrition Risk    Level of Risk/Frequency of Follow-up: low - moderate     Assessment and Plan  Malnutrition of mild degree  Nutrition Problem:  (Moderate) Protein-Calorie Malnutrition  Malnutrition in the context of Chronic Illness/Injury     Related to (etiology):  Inadequate oral intake     Signs and Symptoms (as evidenced by):  Energy Intake: <75% of estimated energy requirement for >/= 1 month  Body Fat Depletion: mild depletion of orbitals   Muscle Mass Depletion: mild depletion of temples   Weight Loss: 12.5%% x 3 months     Interventions(treatment strategy):  Sodium modified diet  Collaboration with other providers  Commercial food- Boost Pudding     Nutrition Diagnosis Status:  continues    Monitor and Evaluation    Food and Nutrient Intake: energy intake, food and beverage intake  Food and Nutrient Adminstration: diet order  Physical Activity and Function: nutrition-related ADLs and IADLs  Anthropometric Measurements: weight change, body mass index  Biochemical Data, Medical Tests and Procedures:  other (specify)  Nutrition-Focused Physical Findings: overall appearance     Malnutrition Assessment  Malnutrition Type: chronic illness          Weight Loss (Malnutrition): greater than 7.5% in 3 months  Energy Intake (Malnutrition): less than 75% for greater than or equal to 1 month   Orbital Region (Subcutaneous Fat Loss): mild depletion  Upper Arm Region (Subcutaneous Fat Loss): well nourished  Thoracic and Lumbar Region: well nourished   Voodoo Region (Muscle Loss): mild depletion  Clavicle Bone Region (Muscle Loss): well nourished  Clavicle and Acromion Bone Region (Muscle Loss): well nourished  Scapular Bone Region (Muscle Loss): well nourished  Dorsal Hand (Muscle Loss): well nourished  Patellar Region (Muscle Loss): well nourished  Anterior Thigh Region (Muscle Loss): well nourished  Posterior Calf Region (Muscle Loss): well nourished     Nutrition Follow-Up    RD Follow-up?: Yes

## 2020-10-27 NOTE — PLAN OF CARE
Pt AAOx3. Pt with generalized weakness. VSS. Pt maintained on IV Vanc. Pt continues with PT/OT. Plan of care discussed with pt. PICC line dressing changed today.

## 2020-10-27 NOTE — PT/OT/SLP PROGRESS
Occupational Therapy   Treatment    Name: Martina Betancourt  MRN: [id number]  Admitting Diagnosis:  Debility       Recommendations:     Discharge Recommendations: nursing facility, skilled  Discharge Equipment Recommendations:  lift device  Barriers to discharge:       Assessment:     Martina Betancourt is a 70 y.o. female with a medical diagnosis of Debility.  She presents with improvement in bed mobility, completing supine to sit with Mod A, sitting EOB x 15 minutes to eat breakfast and perform grooming with supervision/setup. She is till limited by poor endurance and pain. She continues to benefit from skilled OT services at this time.     Performance deficits affecting function are weakness, impaired endurance, impaired cognition, decreased coordination, impaired coordination, impaired self care skills, impaired functional mobilty, decreased lower extremity function, gait instability, decreased safety awareness, pain.     Rehab Prognosis:  Fair; patient would benefit from acute skilled OT services to address these deficits and reach maximum level of function.       Plan:     Patient to be seen 5 x/week to address the above listed problems via self-care/home management, therapeutic activities, therapeutic exercises  · Plan of Care Expires: 11/04/20  · Plan of Care Reviewed with: patient    Subjective     Pain/Comfort:  · Pain Rating 1: 9/10  · Location - Orientation 1: lower  · Location 1: back  · Pain Addressed 1: Reposition, Cessation of Activity  · Pain Rating Post-Intervention 1: 8/10    Objective:     Communicated with: Nursing prior to session.  Patient found supine with PureWick, oxygen upon OT entry to room.    General Precautions: Standard, fall   Orthopedic Precautions:Full weight bearing   Braces: N/A     Occupational Performance:     Bed Mobility:    · Patient completed Rolling/Turning to Right with moderate assistance  · Patient completed Supine to Sit with moderate assistance  · Patient completed Sit to  Supine with moderate assistance     Functional Mobility/Transfers:  · Not completed     Activities of Daily Living:  · Feeding:  supervision sith setup sitting EOB  · Grooming: supervision with setup sitting EOB, brush hair and mouthwash       [facility] 6 Click ADL: 11    Treatment & Education:  Therapist educated patient on sequencing for bed mobility hand placement to improve supine to sit independence, postural control while sitting EOB.     Patient left supine with all lines intact, call button in reach and PCT notifiedEducation:      GOALS:   Multidisciplinary Problems     Occupational Therapy Goals        Problem: Occupational Therapy Goal    Goal Priority Disciplines Outcome Interventions   Occupational Therapy Goal     OT, PT/OT Ongoing, Progressing    Description: Goals to be met by: 11/4/2020     Patient will increase functional independence with ADLs by performing:    Feeding with Supervision.  UE Dressing with Supervision.  LE Dressing with Moderate Assistance.  Grooming while seated with Supervision.  Toileting from bedside commode with Minimal Assistance for hygiene and clothing management.   Sitting at edge of bed x5 minutes with Supervision.  Rolling to Bilateral with Supervision.   Supine to sit with Minimal Assistance.  Squat pivot transfers with Minimal Assistance.  Toilet transfer to bedside commode with Minimal Assistance.  Upper extremity exercise program x10 reps per handout, with assistance as needed.                     Time Tracking:     OT Date of Treatment: 10/27/20  OT Start Time: 0820  OT Stop Time: 0846  OT Total Time (min): 26 min    Billable Minutes:Self Care/Home Management 26 minutes     Golden Bartlett OT  10/27/2020

## 2020-10-28 LAB
ANION GAP SERPL CALC-SCNC: 12 MMOL/L (ref 8–16)
BUN SERPL-MCNC: 10 MG/DL (ref 8–23)
CALCIUM SERPL-MCNC: 9.9 MG/DL (ref 8.7–10.5)
CHLORIDE SERPL-SCNC: 99 MMOL/L (ref 95–110)
CO2 SERPL-SCNC: 29 MMOL/L (ref 23–29)
CREAT SERPL-MCNC: 0.8 MG/DL (ref 0.5–1.4)
E COLI SXT1 STL QL IA: NEGATIVE
E COLI SXT2 STL QL IA: NEGATIVE
EST. GFR  (AFRICAN AMERICAN): >60 ML/MIN/1.73 M^2
EST. GFR  (NON AFRICAN AMERICAN): >60 ML/MIN/1.73 M^2
GLUCOSE SERPL-MCNC: 83 MG/DL (ref 70–110)
POTASSIUM SERPL-SCNC: 3.4 MMOL/L (ref 3.5–5.1)
SODIUM SERPL-SCNC: 140 MMOL/L (ref 136–145)
VANCOMYCIN TROUGH SERPL-MCNC: 22.3 UG/ML (ref 10–22)

## 2020-10-28 PROCEDURE — 94799 UNLISTED PULMONARY SVC/PX: CPT

## 2020-10-28 PROCEDURE — 99900035 HC TECH TIME PER 15 MIN (STAT)

## 2020-10-28 PROCEDURE — 27000221 HC OXYGEN, UP TO 24 HOURS

## 2020-10-28 PROCEDURE — 25000003 PHARM REV CODE 250: Performed by: INTERNAL MEDICINE

## 2020-10-28 PROCEDURE — 99309 SBSQ NF CARE MODERATE MDM 30: CPT | Mod: ,,, | Performed by: FAMILY MEDICINE

## 2020-10-28 PROCEDURE — 99309 PR NURSING FAC CARE, SUBSEQ, SIGNIF COMPLIC: ICD-10-PCS | Mod: ,,, | Performed by: FAMILY MEDICINE

## 2020-10-28 PROCEDURE — 97530 THERAPEUTIC ACTIVITIES: CPT

## 2020-10-28 PROCEDURE — 94761 N-INVAS EAR/PLS OXIMETRY MLT: CPT

## 2020-10-28 PROCEDURE — 11000004 HC SNF PRIVATE

## 2020-10-28 PROCEDURE — 80048 BASIC METABOLIC PNL TOTAL CA: CPT

## 2020-10-28 PROCEDURE — A4216 STERILE WATER/SALINE, 10 ML: HCPCS | Performed by: INTERNAL MEDICINE

## 2020-10-28 PROCEDURE — 25000003 PHARM REV CODE 250: Performed by: NURSE PRACTITIONER

## 2020-10-28 PROCEDURE — 63600175 PHARM REV CODE 636 W HCPCS: Performed by: INTERNAL MEDICINE

## 2020-10-28 PROCEDURE — 97535 SELF CARE MNGMENT TRAINING: CPT

## 2020-10-28 PROCEDURE — 36415 COLL VENOUS BLD VENIPUNCTURE: CPT

## 2020-10-28 PROCEDURE — 80202 ASSAY OF VANCOMYCIN: CPT

## 2020-10-28 RX ORDER — LISINOPRIL 20 MG/1
20 TABLET ORAL DAILY
Status: DISCONTINUED | OUTPATIENT
Start: 2020-10-29 | End: 2020-10-29

## 2020-10-28 RX ADMIN — MICONAZOLE NITRATE: 20 OINTMENT TOPICAL at 08:10

## 2020-10-28 RX ADMIN — FUROSEMIDE 40 MG: 40 TABLET ORAL at 09:10

## 2020-10-28 RX ADMIN — METHOCARBAMOL TABLETS 500 MG: 500 TABLET, COATED ORAL at 08:10

## 2020-10-28 RX ADMIN — METHOCARBAMOL TABLETS 500 MG: 500 TABLET, COATED ORAL at 05:10

## 2020-10-28 RX ADMIN — MICONAZOLE NITRATE: 20 OINTMENT TOPICAL at 09:10

## 2020-10-28 RX ADMIN — ACETAMINOPHEN 500 MG: 500 TABLET, FILM COATED ORAL at 08:10

## 2020-10-28 RX ADMIN — HYDROCODONE BITARTRATE AND ACETAMINOPHEN 1 TABLET: 10; 325 TABLET ORAL at 03:10

## 2020-10-28 RX ADMIN — ISOSORBIDE MONONITRATE 60 MG: 60 TABLET, EXTENDED RELEASE ORAL at 09:10

## 2020-10-28 RX ADMIN — HYDROCODONE BITARTRATE AND ACETAMINOPHEN 1 TABLET: 10; 325 TABLET ORAL at 09:10

## 2020-10-28 RX ADMIN — METOPROLOL SUCCINATE 50 MG: 50 TABLET, EXTENDED RELEASE ORAL at 09:10

## 2020-10-28 RX ADMIN — Medication 10 ML: at 05:10

## 2020-10-28 RX ADMIN — CLOPIDOGREL 75 MG: 75 TABLET, FILM COATED ORAL at 09:10

## 2020-10-28 RX ADMIN — ACETAMINOPHEN 500 MG: 500 TABLET, FILM COATED ORAL at 09:10

## 2020-10-28 RX ADMIN — PANTOPRAZOLE SODIUM 40 MG: 40 TABLET, DELAYED RELEASE ORAL at 05:10

## 2020-10-28 RX ADMIN — LISINOPRIL 10 MG: 10 TABLET ORAL at 09:10

## 2020-10-28 RX ADMIN — MEMANTINE 10 MG: 10 TABLET ORAL at 08:10

## 2020-10-28 RX ADMIN — ASPIRIN 81 MG: 81 TABLET, COATED ORAL at 09:10

## 2020-10-28 RX ADMIN — Medication 10 ML: at 01:10

## 2020-10-28 RX ADMIN — METHOCARBAMOL TABLETS 500 MG: 500 TABLET, COATED ORAL at 12:10

## 2020-10-28 RX ADMIN — PRAVASTATIN SODIUM 40 MG: 40 TABLET ORAL at 08:10

## 2020-10-28 RX ADMIN — Medication 10 ML: at 11:10

## 2020-10-28 RX ADMIN — ACETAMINOPHEN 500 MG: 500 TABLET, FILM COATED ORAL at 03:10

## 2020-10-28 RX ADMIN — METHOCARBAMOL TABLETS 500 MG: 500 TABLET, COATED ORAL at 09:10

## 2020-10-28 RX ADMIN — Medication 10 ML: at 12:10

## 2020-10-28 RX ADMIN — FERROUS SULFATE TAB 325 MG (65 MG ELEMENTAL FE) 325 MG: 325 (65 FE) TAB at 08:10

## 2020-10-28 RX ADMIN — DULOXETINE 60 MG: 30 CAPSULE, DELAYED RELEASE ORAL at 09:10

## 2020-10-28 RX ADMIN — MEMANTINE 10 MG: 10 TABLET ORAL at 09:10

## 2020-10-28 RX ADMIN — ENOXAPARIN SODIUM 40 MG: 40 INJECTION SUBCUTANEOUS at 09:10

## 2020-10-28 RX ADMIN — DONEPEZIL HYDROCHLORIDE 10 MG: 5 TABLET, FILM COATED ORAL at 08:10

## 2020-10-28 NOTE — PROGRESS NOTES
[facility] Hospital Medicine  Progress Note    Patient Name: Martina Betancourt  MRN: [id number]  Patient Class: IP- Swing   Admission Date: 10/20/2020  Length of Stay: 8 days  Attending Physician: Vamsi Manuel MD  Primary Care Provider: Joe Fuller Iii, MD        Subjective:     Principal Problem:Debility        HPI:  71yo female patient with hx of alzheiners, CAD, HLD, HTN, myopathy, MI, obesity, sleep apnea and OA (knees non ambulatory uses gonzalez round). She was living at home with her daughter. Recently admitted to [facility] with MRSA bacteremia and subsequent pneumonia treated with Zyvox x 7 days with clearance of her bacteremia now admitted with back pain found to have T9-10 osteo discitis, T8-10 epidural phlegmon with associated paraverterbral abscesses. Spine infection likely hematogenous from under treated bacteremia. Neurosurgery has been consulted. She has been on Vancomycin and Ceftriaxone. Underwent T9-10 disc space biopsy with IR on 10/16. Cultures negative, though had been on IV antibiotics multiple days prior. Afebrile. HDS. Repeat blood cx sterile. ID rec cont vanc 1750mg q 24 till 12/3.     Overview/Hospital Course:  10/23 Here for skilled ; needing IV abx for spinal abscess;  vanc 1750mg q 24 till 12/3  Afebrile , 3LNC - O2 sat 95%   + debility, very deconditioned; bilt LE x 10 reps followed by bed mobility trng and static sit balance and tolerance at side of the bed  C/o back pain with activity; Occupational therapist notes that she is very resistant to movement and c/o severe pain with minimal activity.   Will order toradol 15mg x 1dose IV; pt did much better after toradol rx today per OT. Will order daily prior to OT as tolerated  Monitor renal fx     10/26 here for skilled therapy and cont IV abc. Vanc 1750mg iv every 12hr. Noted elevated WBC this am and she report that she has had diarrhea for the last 4 days.   · PT reports Supine to Sit: maximal assistance, of 2  persons and patient able to reach with UE to push through rail and with improved push off with UE and initiation  · Sit to Supine: maximal assistance of 2 people with improved ability of patient to control descent of upper body  · Transfers:     · Sit to Stand:  moderate assistance, maximal assistance of 2 persons with no AD.  PT and PT tech blocking both knees while assisting pt to elevate hips from bed  Balance: pt able to stand x15 seconds EOB limited largely by knee and back pain.       10/28  She is still on vanc IV daily. No longer with diarrhea. Did have heme positive stools H/H stable on lovenox for DVT prophylaxis and plavix. Will monitor h/h M/Thurs. No fever. No elevated WBC  pt is really immobile.  · Bed Mobility:     · Rolling Left:  moderate assistance  · Rolling Right: moderate assistance  · Scooting: maximal assistance  · Supine to Sit: maximum/moderate assistace and cues  · Sit to Supine: maximum assistace x 1-2 and cues  · Transfers:     · Sit to Stand:  maximum assistance and cues inside the parallel bars with facilitation tech  · Bed to Chair: to wheelchair  with  maximum assistance and cues  using  Slide Board  · Balance: Standing Static inside the parallel bars with Poor grade. Tolerated for ~10 seconds with 2 attempts with  Maximum assistance and cues.    Cont PT daily     Review of Systems   Constitutional: Negative for activity change, fatigue, fever and unexpected weight change.   HENT: Negative for congestion, ear pain, hearing loss, rhinorrhea and sore throat.    Eyes: Negative for redness and visual disturbance.   Respiratory: Negative for cough, shortness of breath and wheezing.    Cardiovascular: Negative for chest pain, palpitations and leg swelling.   Gastrointestinal: Negative for abdominal pain, constipation, diarrhea (watery, non-bloody), nausea and vomiting.   Genitourinary: Negative for dysuria, frequency and urgency.   Musculoskeletal: Negative for back pain, joint swelling and  neck pain.   Skin: Negative for color change, rash and wound.   Neurological: Negative for dizziness, tremors, weakness, light-headedness and headaches.   Psychiatric/Behavioral: Positive for confusion and decreased concentration.     Objective:     Vital Signs (Most Recent):  Temp: 98.4 °F (36.9 °C) (10/28/20 0733)  Pulse: 98 (10/28/20 0733)  Resp: 20 (10/28/20 0903)  BP: (!) 180/80 (10/28/20 0733)  SpO2: (!) 92 % (10/28/20 0735) Vital Signs (24h Range):  Temp:  [98.4 °F (36.9 °C)-98.8 °F (37.1 °C)] 98.4 °F (36.9 °C)  Pulse:  [64-98] 98  Resp:  [18-20] 20  SpO2:  [92 %-95 %] 92 %  BP: (162-180)/(72-80) 180/80     Weight: 132.5 kg (292 lb 1.8 oz)  Body mass index is 41.91 kg/m².    Physical Exam  Vitals signs and nursing note reviewed.   Constitutional:       General: She is not in acute distress.     Appearance: She is well-developed.   HENT:      Head: Normocephalic and atraumatic.      Right Ear: External ear normal.      Left Ear: External ear normal.      Nose: Nose normal.      Mouth/Throat:      Mouth: Mucous membranes are moist.      Pharynx: Oropharynx is clear.   Eyes:      General:         Right eye: No discharge.         Left eye: No discharge.      Extraocular Movements: Extraocular movements intact.      Conjunctiva/sclera: Conjunctivae normal.      Pupils: Pupils are equal, round, and reactive to light.   Neck:      Musculoskeletal: Neck supple.      Thyroid: No thyromegaly.   Cardiovascular:      Rate and Rhythm: Normal rate and regular rhythm.      Heart sounds: No murmur.   Pulmonary:      Effort: Pulmonary effort is normal. No respiratory distress.      Breath sounds: Normal breath sounds. No wheezing or rales.   Abdominal:      General: Bowel sounds are normal. There is no distension.      Palpations: Abdomen is soft.      Tenderness: There is no abdominal tenderness.   Musculoskeletal:      Right lower leg: No edema.      Left lower leg: No edema.   Lymphadenopathy:      Cervical: No cervical  adenopathy.   Skin:     General: Skin is warm and dry.   Neurological:      General: No focal deficit present.      Mental Status: She is alert.      Cranial Nerves: No cranial nerve deficit.      Comments: Obese, not very cooperative with PT.  Max assistance   Psychiatric:         Behavior: Behavior normal.           CRANIAL NERVES     CN III, IV, VI   Pupils are equal, round, and reactive to light.       Significant Labs:   CBC:   Recent Labs   Lab 10/27/20  0611   WBC 9.71   HGB 9.7*   HCT 33.2*        BMP  Lab Results   Component Value Date     10/26/2020    K 3.9 10/26/2020    CL 99 10/26/2020    CO2 32 (H) 10/26/2020    BUN 18 10/26/2020    CREATININE 0.8 10/26/2020    CALCIUM 9.7 10/26/2020    ANIONGAP 9 10/26/2020    ESTGFRAFRICA >60 10/26/2020    EGFRNONAA >60 10/26/2020     10/23 vanc trough 20.5    Body Fluid Type  Other (Specify)    Comment: T9-T10   Fluid Appearance  Clear    Fluid Color  Icteric    WBC, Body Fluid /cu mm 10    Comment: Reference ranges for body fluids not established.   Correlate clinically.    Segs, Fluid % 65    Lymphs, Fluid % 23    Monocytes/Macrophages, Fluid % 12    Aerobic culture no growth   Fungal culture in process  10/8 blood culture NGTD x 5    9/23 METHICILLIN RESISTANT STAPHYLOCOCCUS AUREUS  Clindamycin <=0.5 mcg/mL Sensitive      Erythromycin <=0.5 mcg/mL Sensitive     Oxacillin >2 mcg/mL Resistant     Penicillin 8 mcg/mL Resistant     Tetracycline <=4 mcg/mL Sensitive     Trimeth/Sulfa <=0.5/9.5 m... Sensitive     Vancomycin 2 mcg/mL Sensitive      Significant Imaging:     10/19 CXR There is cardiomegaly.  Lungs are clear.  Central line tip upper SVC    10/13 CT lumbar   Findings above consistent with T9-T10 discitis/osteomyelitis with multilocular paravertebral abscesses, better evaluated on MRI 10/12/2020.  No significant retropulsion osseous fragments into the canal.     Multilevel lumbar spondylosis resulting in moderate to severe spinal canal stenosis  from L2-L3 through L4-L5 and severe bilateral neural foraminal narrowing at L5-S1.     Mild multilevel thoracic spondylosis, detailed above.     Bilateral pleural effusions, larger on the left with compressive atelectasis/volume loss of adjacent lung parenchyma.     Moderate-large size hiatal hernia     Multi-vessel coronary artery atherosclerosis.     10/13 CT thoracic spine   Findings above consistent with T9-T10 discitis/osteomyelitis with multilocular paravertebral abscesses, better evaluated on MRI 10/12/2020.  No significant retropulsion osseous fragments into the canal.     Multilevel lumbar spondylosis resulting in moderate to severe spinal canal stenosis from L2-L3 through L4-L5 and severe bilateral neural foraminal narrowing at L5-S1.     Mild multilevel thoracic spondylosis, detailed above.     Bilateral pleural effusions, larger on the left with compressive atelectasis/volume loss of adjacent lung parenchyma.     Moderate-large size hiatal hernia     Multi-vessel coronary artery atherosclerosis.    10/12 MRI thoracic Discitis/osteomyelitis at the T9-T10 level with multilocular paravertebral abscesses similar to prior exam.     T8-T10 epidural phlegmon results in moderate narrowing of the thecal sac. There is associated nerve root enhancement, which may be seen with arachnoiditis.     Bilateral complex pleural effusions with enhancement.  Empyema is a consideration.  Consider further evaluation with contrast enhanced chest CT.     Multilevel mild degenerative changes and disc bulges in the thoracic spine, most prominent at T5-T6.    10/12 MRI lumbar No evidence for spondylodiscitis.     Bilateral L5 spondylolysis with grade 2 anterolisthesis.     Multilevel degenerative changes of the lumbar spine detailed above.  Moderate to severe spinal canal stenosis noted at L2-L5.  Severe neural foraminal narrowing noted at L5-S1.    9/23 EKG Normal sinus rhythm  Nonspecific ST and T wave abnormality  Abnormal  ECG  When compared with ECG of 10-AUG-2020 06:42,  Nonspecific T wave abnormality now evident in Lateral leads  Confirmed by Willie Matthews MD ([id number]) on 9/23/2020 3:05:10 PM      Assessment/Plan:      * Debility  Was walking with walker prior to pneumonia/bacteremia admit last month then after d.c was only w/c bound (gonzalez round) will add pt here and try and get her as strong as poss as she lives at home with family.   10/23 Chronic co back ache but also has recent high ankle fx ; per EDDIE Vinson NP Spoke with Ariela VASQUES who will see patient while on SWING. She looked over x rays and hx and reports that patient can weight bear as tolerated. Nurse/OT/MD notified    · 10/26 Supine to Sit: maximal assistance, of 2 persons and patient able to reach with UE to push through rail and with improved push off with UE and initiation  · Sit to Supine: maximal assistance of 2 people with improved ability of patient to control descent of upper body  · Transfers:     · Sit to Stand:  moderate assistance, maximal assistance of 2 persons with no AD.  PT and PT tech blocking both knees while assisting pt to elevate hips from bed  Balance: pt able to stand x15 seconds EOB limited largely by knee and back pain.     10/28  · Bed Mobility:     · Rolling Left:  moderate assistance  · Rolling Right: moderate assistance  · Scooting: maximal assistance  · Supine to Sit: maximum/moderate assistace and cues  · Sit to Supine: maximum assistace x 1-2 and cues  · Transfers:     · Sit to Stand:  maximum assistance and cues inside the parallel bars with facilitation tech  · Bed to Chair: to wheelchair  with  maximum assistance and cues  using  Slide Board  · Balance: Standing Static inside the parallel bars with Poor grade. Tolerated for ~10 seconds with 2 attempts with  Maximum assistance and cues.    Diarrhea  Stools d/c as diarrhea has improved. + heme occult     Malnutrition of mild degree  Dietary  boost      Hydronephrosis  Cont asa,  plavix, lasix, isosorbide, lisinopril, toprol and statin      CAD (coronary artery disease)  Cont asa, plavix, lasix, isosorbide, lisinopril, toprol and statin  Time to stop Lovenox      HTN (hypertension)  Increase lisinopril 2.5>10mg daily  10/23 SBP 160s at times; lisinopril just increased will give more time for lisinopril to work before titration  10/26 SBP 140s; cont to monitor  10/28 /80- increase lisinopril     Discitis  vanc 1450mg IV every 24hr x 8 weeks total till 12/3 per ID ; will stay here for the duration   PT    Severe obesity (BMI >= 40)  Using boost as she has low appetite and low protein       Obstructive sleep apnea treated with continuous positive airway pressure (CPAP)  Bring home cpap      Dementia without behavioral disturbance  Cont namenda and aricpet         VTE Risk Mitigation (From admission, onward)         Ordered     enoxaparin injection 40 mg  Every 12 hours      10/20/20 2142                Discharge Planning   BRIT:      Code Status: DNR   Is the patient medically ready for discharge?:     Reason for patient still in hospital (select all that apply): Treatment  Discharge Plan A: Home with family, Home Health                  Vamsi Manuel MD  Department of Hospital Medicine   [facility]

## 2020-10-28 NOTE — SUBJECTIVE & OBJECTIVE
Review of Systems   Constitutional: Negative for activity change, fatigue, fever and unexpected weight change.   HENT: Negative for congestion, ear pain, hearing loss, rhinorrhea and sore throat.    Eyes: Negative for redness and visual disturbance.   Respiratory: Negative for cough, shortness of breath and wheezing.    Cardiovascular: Negative for chest pain, palpitations and leg swelling.   Gastrointestinal: Negative for abdominal pain, constipation, diarrhea (watery, non-bloody), nausea and vomiting.   Genitourinary: Negative for dysuria, frequency and urgency.   Musculoskeletal: Negative for back pain, joint swelling and neck pain.   Skin: Negative for color change, rash and wound.   Neurological: Negative for dizziness, tremors, weakness, light-headedness and headaches.   Psychiatric/Behavioral: Positive for confusion and decreased concentration.     Objective:     Vital Signs (Most Recent):  Temp: 98.4 °F (36.9 °C) (10/28/20 0733)  Pulse: 98 (10/28/20 0733)  Resp: 20 (10/28/20 0903)  BP: (!) 180/80 (10/28/20 0733)  SpO2: (!) 92 % (10/28/20 0735) Vital Signs (24h Range):  Temp:  [98.4 °F (36.9 °C)-98.8 °F (37.1 °C)] 98.4 °F (36.9 °C)  Pulse:  [64-98] 98  Resp:  [18-20] 20  SpO2:  [92 %-95 %] 92 %  BP: (162-180)/(72-80) 180/80     Weight: 132.5 kg (292 lb 1.8 oz)  Body mass index is 41.91 kg/m².    Physical Exam  Vitals signs and nursing note reviewed.   Constitutional:       General: She is not in acute distress.     Appearance: She is well-developed.   HENT:      Head: Normocephalic and atraumatic.      Right Ear: External ear normal.      Left Ear: External ear normal.      Nose: Nose normal.      Mouth/Throat:      Mouth: Mucous membranes are moist.      Pharynx: Oropharynx is clear.   Eyes:      General:         Right eye: No discharge.         Left eye: No discharge.      Extraocular Movements: Extraocular movements intact.      Conjunctiva/sclera: Conjunctivae normal.      Pupils: Pupils are equal, round,  and reactive to light.   Neck:      Musculoskeletal: Neck supple.      Thyroid: No thyromegaly.   Cardiovascular:      Rate and Rhythm: Normal rate and regular rhythm.      Heart sounds: No murmur.   Pulmonary:      Effort: Pulmonary effort is normal. No respiratory distress.      Breath sounds: Normal breath sounds. No wheezing or rales.   Abdominal:      General: Bowel sounds are normal. There is no distension.      Palpations: Abdomen is soft.      Tenderness: There is no abdominal tenderness.   Musculoskeletal:      Right lower leg: No edema.      Left lower leg: No edema.   Lymphadenopathy:      Cervical: No cervical adenopathy.   Skin:     General: Skin is warm and dry.   Neurological:      General: No focal deficit present.      Mental Status: She is alert.      Cranial Nerves: No cranial nerve deficit.      Comments: Obese, not very cooperative with PT.  Max assistance   Psychiatric:         Behavior: Behavior normal.           CRANIAL NERVES     CN III, IV, VI   Pupils are equal, round, and reactive to light.       Significant Labs:   CBC:   Recent Labs   Lab 10/27/20  0611   WBC 9.71   HGB 9.7*   HCT 33.2*        BMP  Lab Results   Component Value Date     10/26/2020    K 3.9 10/26/2020    CL 99 10/26/2020    CO2 32 (H) 10/26/2020    BUN 18 10/26/2020    CREATININE 0.8 10/26/2020    CALCIUM 9.7 10/26/2020    ANIONGAP 9 10/26/2020    ESTGFRAFRICA >60 10/26/2020    EGFRNONAA >60 10/26/2020     10/23 vanc trough 20.5    Body Fluid Type  Other (Specify)    Comment: T9-T10   Fluid Appearance  Clear    Fluid Color  Icteric    WBC, Body Fluid /cu mm 10    Comment: Reference ranges for body fluids not established.   Correlate clinically.    Segs, Fluid % 65    Lymphs, Fluid % 23    Monocytes/Macrophages, Fluid % 12    Aerobic culture no growth   Fungal culture in process  10/8 blood culture NGTD x 5    9/23 METHICILLIN RESISTANT STAPHYLOCOCCUS AUREUS  Clindamycin <=0.5 mcg/mL Sensitive       Erythromycin <=0.5 mcg/mL Sensitive     Oxacillin >2 mcg/mL Resistant     Penicillin 8 mcg/mL Resistant     Tetracycline <=4 mcg/mL Sensitive     Trimeth/Sulfa <=0.5/9.5 m... Sensitive     Vancomycin 2 mcg/mL Sensitive      Significant Imaging:     10/19 CXR There is cardiomegaly.  Lungs are clear.  Central line tip upper SVC    10/13 CT lumbar   Findings above consistent with T9-T10 discitis/osteomyelitis with multilocular paravertebral abscesses, better evaluated on MRI 10/12/2020.  No significant retropulsion osseous fragments into the canal.     Multilevel lumbar spondylosis resulting in moderate to severe spinal canal stenosis from L2-L3 through L4-L5 and severe bilateral neural foraminal narrowing at L5-S1.     Mild multilevel thoracic spondylosis, detailed above.     Bilateral pleural effusions, larger on the left with compressive atelectasis/volume loss of adjacent lung parenchyma.     Moderate-large size hiatal hernia     Multi-vessel coronary artery atherosclerosis.     10/13 CT thoracic spine   Findings above consistent with T9-T10 discitis/osteomyelitis with multilocular paravertebral abscesses, better evaluated on MRI 10/12/2020.  No significant retropulsion osseous fragments into the canal.     Multilevel lumbar spondylosis resulting in moderate to severe spinal canal stenosis from L2-L3 through L4-L5 and severe bilateral neural foraminal narrowing at L5-S1.     Mild multilevel thoracic spondylosis, detailed above.     Bilateral pleural effusions, larger on the left with compressive atelectasis/volume loss of adjacent lung parenchyma.     Moderate-large size hiatal hernia     Multi-vessel coronary artery atherosclerosis.    10/12 MRI thoracic Discitis/osteomyelitis at the T9-T10 level with multilocular paravertebral abscesses similar to prior exam.     T8-T10 epidural phlegmon results in moderate narrowing of the thecal sac. There is associated nerve root enhancement, which may be seen with  arachnoiditis.     Bilateral complex pleural effusions with enhancement.  Empyema is a consideration.  Consider further evaluation with contrast enhanced chest CT.     Multilevel mild degenerative changes and disc bulges in the thoracic spine, most prominent at T5-T6.    10/12 MRI lumbar No evidence for spondylodiscitis.     Bilateral L5 spondylolysis with grade 2 anterolisthesis.     Multilevel degenerative changes of the lumbar spine detailed above.  Moderate to severe spinal canal stenosis noted at L2-L5.  Severe neural foraminal narrowing noted at L5-S1.    9/23 EKG Normal sinus rhythm  Nonspecific ST and T wave abnormality  Abnormal ECG  When compared with ECG of 10-AUG-2020 06:42,  Nonspecific T wave abnormality now evident in Lateral leads  Confirmed by Willie Matthews MD ([id number]) on 9/23/2020 3:05:10 PM

## 2020-10-28 NOTE — ASSESSMENT & PLAN NOTE
Was walking with walker prior to pneumonia/bacteremia admit last month then after d.c was only w/c bound (gonzalez round) will add pt here and try and get her as strong as poss as she lives at home with family.   10/23 Chronic co back ache but also has recent high ankle fx ; per EDDIE Vinson NP Spoke with Ariela VASQUES who will see patient while on SWING. She looked over x rays and hx and reports that patient can weight bear as tolerated. Nurse/OT/MD notified    · 10/26 Supine to Sit: maximal assistance, of 2 persons and patient able to reach with UE to push through rail and with improved push off with UE and initiation  · Sit to Supine: maximal assistance of 2 people with improved ability of patient to control descent of upper body  · Transfers:     · Sit to Stand:  moderate assistance, maximal assistance of 2 persons with no AD.  PT and PT tech blocking both knees while assisting pt to elevate hips from bed  Balance: pt able to stand x15 seconds EOB limited largely by knee and back pain.     10/28  · Bed Mobility:     · Rolling Left:  moderate assistance  · Rolling Right: moderate assistance  · Scooting: maximal assistance  · Supine to Sit: maximum/moderate assistace and cues  · Sit to Supine: maximum assistace x 1-2 and cues  · Transfers:     · Sit to Stand:  maximum assistance and cues inside the parallel bars with facilitation tech  · Bed to Chair: to wheelchair  with  maximum assistance and cues  using  Slide Board  · Balance: Standing Static inside the parallel bars with Poor grade. Tolerated for ~10 seconds with 2 attempts with  Maximum assistance and cues.

## 2020-10-28 NOTE — ASSESSMENT & PLAN NOTE
Increase lisinopril 2.5>10mg daily  10/23 SBP 160s at times; lisinopril just increased will give more time for lisinopril to work before titration  10/26 SBP 140s; cont to monitor  10/28 /80- increase lisinopril

## 2020-10-28 NOTE — PT/OT/SLP PROGRESS
"Physical Therapy Treatment    Patient Name:  Martina Betancourt   MRN:  [id number]    Recommendations:     Discharge Recommendations:  nursing facility, skilled   Discharge Equipment Recommendations: lift device   Barriers to discharge: Decreased caregiver support    Assessment:     Martina Betancourt is a 70 y.o. female admitted with a medical diagnosis of Debility.  She presents with the following impairments/functional limitations:  weakness, impaired balance, impaired self care skills, impaired functional mobilty, impaired endurance, gait instability, decreased upper extremity function, decreased lower extremity function, pain, impaired cognition, decreased safety awareness. Patient has increased static sitting tolerance at side of the bed while eating late lunch meal. Still noted lower back pain during transitional movement.     Rehab Prognosis: Fair; patient would benefit from acute skilled PT services to address these deficits and reach maximum level of function.    Recent Surgery: * No surgery found *      Plan:     During this hospitalization, patient to be seen daily to address the identified rehab impairments via gait training, therapeutic activities, therapeutic exercises and progress toward the following goals:    · Plan of Care Expires:  11/10/20    Subjective     Chief Complaint: Lower Back Pain up[on transitional movement.  Patient/Family Comments/goals: "To decrease pain and able to move better".  Pain/Comfort:  · Pain Rating 1: 7/10  · Location - Side 1: Bilateral  · Location - Orientation 1: lower  · Location 1: back  · Pain Addressed 1: Reposition  · Pain Rating Post-Intervention 1: 7/10  · Pain Rating 2: 0/10  · Pain Rating Post-Intervention 2: 0/10      Objective:     Communicated with patient  prior to session.  Patient found supine with peripheral IV, oxygen upon PT entry to room.     General Precautions: Standard, fall   Orthopedic Precautions:Full weight bearing   Braces: N/A     Functional " Mobility:  · Bed Mobility:     · Rolling Left:  minimal/contact guard assistance and cues using bedrail  · Rolling Right: minimal/contact guard assistance and cues using bedrail  · Scooting: minimal assistance and cues  · Supine to Sit: moderate/minimal assistance and cues  · Sit to Supine: maximum/moderate assistance and cues  · Balance: Sitting Static: Fair+; tolerated for ~30 minutes at side of the bed unsupported.      AM-PAC 6 CLICK MOBILITY  Turning over in bed (including adjusting bedclothes, sheets and blankets)?: 3  Sitting down on and standing up from a chair with arms (e.g., wheelchair, bedside commode, etc.): 2  Moving from lying on back to sitting on the side of the bed?: 2  Moving to and from a bed to a chair (including a wheelchair)?: 2  Need to walk in hospital room?: 2       Therapeutic Activities and Exercises:   Worked on body sequence on bed mobility with facilitation tech; Sitting balance trng and tolerance at side of the bed for self care activity    Patient left sitting up at side of the bed with all lines intact, call button in reach and nursing notified..    GOALS:   Multidisciplinary Problems     Physical Therapy Goals        Problem: Physical Therapy Goal    Goal Priority Disciplines Outcome Goal Variances Interventions   Physical Therapy Goal     PT, PT/OT Ongoing, Progressing     Description: Goals to be met by: 11/10/20    Patient will increase functional independence with mobility by performin. Rolling to sides using bed rail with contact guard assistance and cues  2. Supine to sit with minimal assistance and cues  3. Sit to supine with moderate assistance and cues  4. Tolerate Static Sit at side of the bed for 10 minutes with Standby Assistance  5. Bed to chair transfer with moderate assistance and cues using Slide Board TECHNIQUE  6. Lower extremity exercise program x15 reps per handout, with assistance as needed                      Time Tracking:     PT Received On:  10/28/20  PT Start Time: 1330     PT Stop Time: 1405  PT Total Time (min): 35 min     Billable Minutes: Therapeutic Activity 15    Treatment Type: Treatment  PT/PTA: PT     PTA Visit Number: 0     Edgar Lamas, PT  10/28/2020

## 2020-10-28 NOTE — PT/OT/SLP PROGRESS
"Occupational Therapy   Treatment    Name: Martina Betancourt  MRN: [id number]  Admitting Diagnosis:  Debility       Recommendations:     Discharge Recommendations: nursing facility, skilled  Discharge Equipment Recommendations:  lift device  Barriers to discharge:       Assessment:     Martina Betancourt is a 70 y.o. female with a medical diagnosis of Debility.  She presents with decreased motivation,after eating EOB reporting, "now lay me down so I can sleep." Performance deficits affecting function are weakness, impaired balance, decreased safety awareness, impaired skin, impaired endurance, pain, impaired cognition, impaired cardiopulmonary response to activity, impaired self care skills, decreased coordination, impaired coordination, decreased upper extremity function, impaired functional mobilty, gait instability, decreased lower extremity function.     Rehab Prognosis:  Fair; patient would benefit from acute skilled OT services to address these deficits and reach maximum level of function.       Plan:     Patient to be seen 5 x/week to address the above listed problems via self-care/home management, therapeutic activities, therapeutic exercises  · Plan of Care Expires: 11/04/20  · Plan of Care Reviewed with: patient    Subjective     Pain/Comfort:  · Pain Rating 1: 8/10  · Location - Orientation 1: lower  · Location 1: back  · Pain Rating Post-Intervention 1: 8/10    Objective:     Communicated with: Nursing prior to session.  Patient found supine with peripheral IV, oxygen, PureWick upon OT entry to room.    General Precautions: Standard, fall   Orthopedic Precautions:Full weight bearing   Braces: N/A     Occupational Performance:     Bed Mobility:    · Patient completed Rolling/Turning to Right with minimum assistance  · Patient completed Supine to Sit with moderate assistance  · Patient completed Sit to Supine with moderate assistance     Functional Mobility/Transfers:  Patient declined    Activities of Daily " Living:  · Feeding:  supervision sitting EOB with setup      [facility] 6 Click ADL: 11    Treatment & Education:  Therapist educated patient on importance in participation in therapy, sequencing for bed mobility and postural control.    Patient left supine with all lines intact and nursing notifiedEducation:      GOALS:   Multidisciplinary Problems     Occupational Therapy Goals        Problem: Occupational Therapy Goal    Goal Priority Disciplines Outcome Interventions   Occupational Therapy Goal     OT, PT/OT Ongoing, Progressing    Description: Goals to be met by: 11/4/2020     Patient will increase functional independence with ADLs by performing:    Feeding with Supervision.  UE Dressing with Supervision.  LE Dressing with Moderate Assistance.  Grooming while seated with Supervision.  Toileting from bedside commode with Minimal Assistance for hygiene and clothing management.   Sitting at edge of bed x5 minutes with Supervision.  Rolling to Bilateral with Supervision.   Supine to sit with Minimal Assistance.  Squat pivot transfers with Minimal Assistance.  Toilet transfer to bedside commode with Minimal Assistance.  Upper extremity exercise program x10 reps per handout, with assistance as needed.                     Time Tracking:     OT Date of Treatment: 10/28/20  OT Start Time: 0750  OT Stop Time: 0814  OT Total Time (min): 24 min    Billable Minutes:Self Care/Home Management 24 minutes    Golden Bartlett OT  10/28/2020

## 2020-10-29 LAB
ANION GAP SERPL CALC-SCNC: 12 MMOL/L (ref 8–16)
ANISOCYTOSIS BLD QL SMEAR: SLIGHT
BASOPHILS # BLD AUTO: 0.02 K/UL (ref 0–0.2)
BASOPHILS NFR BLD: 0.2 % (ref 0–1.9)
BUN SERPL-MCNC: 10 MG/DL (ref 8–23)
CALCIUM SERPL-MCNC: 9.9 MG/DL (ref 8.7–10.5)
CHLORIDE SERPL-SCNC: 98 MMOL/L (ref 95–110)
CO2 SERPL-SCNC: 31 MMOL/L (ref 23–29)
CREAT SERPL-MCNC: 0.8 MG/DL (ref 0.5–1.4)
DIFFERENTIAL METHOD: ABNORMAL
EOSINOPHIL # BLD AUTO: 0.2 K/UL (ref 0–0.5)
EOSINOPHIL NFR BLD: 2.7 % (ref 0–8)
ERYTHROCYTE [DISTWIDTH] IN BLOOD BY AUTOMATED COUNT: 16 % (ref 11.5–14.5)
EST. GFR  (AFRICAN AMERICAN): >60 ML/MIN/1.73 M^2
EST. GFR  (NON AFRICAN AMERICAN): >60 ML/MIN/1.73 M^2
GLUCOSE SERPL-MCNC: 82 MG/DL (ref 70–110)
HCT VFR BLD AUTO: 34.2 % (ref 37–48.5)
HGB BLD-MCNC: 9.9 G/DL (ref 12–16)
HYPOCHROMIA BLD QL SMEAR: ABNORMAL
IMM GRANULOCYTES # BLD AUTO: 0.04 K/UL (ref 0–0.04)
IMM GRANULOCYTES NFR BLD AUTO: 0.5 % (ref 0–0.5)
LYMPHOCYTES # BLD AUTO: 1.4 K/UL (ref 1–4.8)
LYMPHOCYTES NFR BLD: 17.1 % (ref 18–48)
MAGNESIUM SERPL-MCNC: 1.9 MG/DL (ref 1.6–2.6)
MCH RBC QN AUTO: 26.7 PG (ref 27–31)
MCHC RBC AUTO-ENTMCNC: 28.9 G/DL (ref 32–36)
MCV RBC AUTO: 92 FL (ref 82–98)
MONOCYTES # BLD AUTO: 1.2 K/UL (ref 0.3–1)
MONOCYTES NFR BLD: 14.3 % (ref 4–15)
NEUTROPHILS # BLD AUTO: 5.5 K/UL (ref 1.8–7.7)
NEUTROPHILS NFR BLD: 65.2 % (ref 38–73)
NRBC BLD-RTO: 0 /100 WBC
O+P STL MICRO: NORMAL
PHOSPHATE SERPL-MCNC: 3.8 MG/DL (ref 2.7–4.5)
PLATELET # BLD AUTO: 324 K/UL (ref 150–350)
PMV BLD AUTO: 10.1 FL (ref 9.2–12.9)
POLYCHROMASIA BLD QL SMEAR: ABNORMAL
POTASSIUM SERPL-SCNC: 3.2 MMOL/L (ref 3.5–5.1)
RBC # BLD AUTO: 3.71 M/UL (ref 4–5.4)
SODIUM SERPL-SCNC: 141 MMOL/L (ref 136–145)
STOMATOCYTES BLD QL SMEAR: PRESENT
TARGETS BLD QL SMEAR: ABNORMAL
VANCOMYCIN SERPL-MCNC: 12.5 UG/ML
WBC # BLD AUTO: 8.44 K/UL (ref 3.9–12.7)

## 2020-10-29 PROCEDURE — 25000003 PHARM REV CODE 250: Performed by: NURSE PRACTITIONER

## 2020-10-29 PROCEDURE — 80048 BASIC METABOLIC PNL TOTAL CA: CPT

## 2020-10-29 PROCEDURE — 84100 ASSAY OF PHOSPHORUS: CPT

## 2020-10-29 PROCEDURE — 11000004 HC SNF PRIVATE

## 2020-10-29 PROCEDURE — 85025 COMPLETE CBC W/AUTO DIFF WBC: CPT

## 2020-10-29 PROCEDURE — 97530 THERAPEUTIC ACTIVITIES: CPT

## 2020-10-29 PROCEDURE — 80202 ASSAY OF VANCOMYCIN: CPT

## 2020-10-29 PROCEDURE — 25000003 PHARM REV CODE 250: Performed by: FAMILY MEDICINE

## 2020-10-29 PROCEDURE — 94799 UNLISTED PULMONARY SVC/PX: CPT

## 2020-10-29 PROCEDURE — 94761 N-INVAS EAR/PLS OXIMETRY MLT: CPT

## 2020-10-29 PROCEDURE — 83735 ASSAY OF MAGNESIUM: CPT

## 2020-10-29 PROCEDURE — 27000221 HC OXYGEN, UP TO 24 HOURS

## 2020-10-29 PROCEDURE — 99900035 HC TECH TIME PER 15 MIN (STAT)

## 2020-10-29 PROCEDURE — 63600175 PHARM REV CODE 636 W HCPCS: Performed by: FAMILY MEDICINE

## 2020-10-29 PROCEDURE — A4216 STERILE WATER/SALINE, 10 ML: HCPCS | Performed by: INTERNAL MEDICINE

## 2020-10-29 PROCEDURE — 25000003 PHARM REV CODE 250: Performed by: INTERNAL MEDICINE

## 2020-10-29 RX ORDER — LISINOPRIL 10 MG/1
10 TABLET ORAL DAILY
Status: DISCONTINUED | OUTPATIENT
Start: 2020-10-29 | End: 2020-12-01 | Stop reason: HOSPADM

## 2020-10-29 RX ORDER — POTASSIUM CHLORIDE 20 MEQ/1
40 TABLET, EXTENDED RELEASE ORAL ONCE
Status: COMPLETED | OUTPATIENT
Start: 2020-10-29 | End: 2020-10-29

## 2020-10-29 RX ORDER — VANCOMYCIN HCL IN 5 % DEXTROSE 1G/250ML
1000 PLASTIC BAG, INJECTION (ML) INTRAVENOUS
Status: DISCONTINUED | OUTPATIENT
Start: 2020-10-29 | End: 2020-11-06

## 2020-10-29 RX ADMIN — DONEPEZIL HYDROCHLORIDE 10 MG: 5 TABLET, FILM COATED ORAL at 08:10

## 2020-10-29 RX ADMIN — MEMANTINE 10 MG: 10 TABLET ORAL at 09:10

## 2020-10-29 RX ADMIN — Medication 10 ML: at 05:10

## 2020-10-29 RX ADMIN — PANTOPRAZOLE SODIUM 40 MG: 40 TABLET, DELAYED RELEASE ORAL at 05:10

## 2020-10-29 RX ADMIN — Medication 10 ML: at 10:10

## 2020-10-29 RX ADMIN — VANCOMYCIN HYDROCHLORIDE 1000 MG: 1 INJECTION, POWDER, LYOPHILIZED, FOR SOLUTION INTRAVENOUS at 11:10

## 2020-10-29 RX ADMIN — METHOCARBAMOL TABLETS 500 MG: 500 TABLET, COATED ORAL at 08:10

## 2020-10-29 RX ADMIN — HYDROCODONE BITARTRATE AND ACETAMINOPHEN 1 TABLET: 10; 325 TABLET ORAL at 05:10

## 2020-10-29 RX ADMIN — MICONAZOLE NITRATE: 20 OINTMENT TOPICAL at 09:10

## 2020-10-29 RX ADMIN — LISINOPRIL 10 MG: 10 TABLET ORAL at 09:10

## 2020-10-29 RX ADMIN — ISOSORBIDE MONONITRATE 60 MG: 60 TABLET, EXTENDED RELEASE ORAL at 09:10

## 2020-10-29 RX ADMIN — ACETAMINOPHEN 500 MG: 500 TABLET, FILM COATED ORAL at 04:10

## 2020-10-29 RX ADMIN — Medication 10 ML: at 12:10

## 2020-10-29 RX ADMIN — ACETAMINOPHEN 500 MG: 500 TABLET, FILM COATED ORAL at 08:10

## 2020-10-29 RX ADMIN — MEMANTINE 10 MG: 10 TABLET ORAL at 08:10

## 2020-10-29 RX ADMIN — ASPIRIN 81 MG: 81 TABLET, COATED ORAL at 09:10

## 2020-10-29 RX ADMIN — METOPROLOL SUCCINATE 50 MG: 50 TABLET, EXTENDED RELEASE ORAL at 09:10

## 2020-10-29 RX ADMIN — METHOCARBAMOL TABLETS 500 MG: 500 TABLET, COATED ORAL at 04:10

## 2020-10-29 RX ADMIN — PRAVASTATIN SODIUM 40 MG: 40 TABLET ORAL at 08:10

## 2020-10-29 RX ADMIN — FUROSEMIDE 40 MG: 40 TABLET ORAL at 09:10

## 2020-10-29 RX ADMIN — FERROUS SULFATE TAB 325 MG (65 MG ELEMENTAL FE) 325 MG: 325 (65 FE) TAB at 08:10

## 2020-10-29 RX ADMIN — POTASSIUM CHLORIDE 40 MEQ: 1500 TABLET, EXTENDED RELEASE ORAL at 12:10

## 2020-10-29 RX ADMIN — METHOCARBAMOL TABLETS 500 MG: 500 TABLET, COATED ORAL at 12:10

## 2020-10-29 RX ADMIN — MELATONIN TAB 3 MG 6 MG: 3 TAB at 08:10

## 2020-10-29 RX ADMIN — HYDROCODONE BITARTRATE AND ACETAMINOPHEN 1 TABLET: 10; 325 TABLET ORAL at 04:10

## 2020-10-29 RX ADMIN — METHOCARBAMOL TABLETS 500 MG: 500 TABLET, COATED ORAL at 09:10

## 2020-10-29 RX ADMIN — HYDROCODONE BITARTRATE AND ACETAMINOPHEN 1 TABLET: 10; 325 TABLET ORAL at 10:10

## 2020-10-29 RX ADMIN — DULOXETINE 60 MG: 30 CAPSULE, DELAYED RELEASE ORAL at 09:10

## 2020-10-29 RX ADMIN — ACETAMINOPHEN 500 MG: 500 TABLET, FILM COATED ORAL at 09:10

## 2020-10-29 RX ADMIN — CLOPIDOGREL 75 MG: 75 TABLET, FILM COATED ORAL at 09:10

## 2020-10-29 NOTE — PT/OT/SLP PROGRESS
Occupational Therapy      Patient Name:  Martina Betancourt   MRN:  [id number]    Patient not seen today secondary to Pain, Patient fatigue(Patient reporting she does not want to upset therapist but she is in so much pain and so tired,patient reassured and encouraged patient to rest now and attempt to participate in afternoon PT session, patient agreeable.). Will follow-up as appropriate.    Golden Bartlett OT  10/29/2020

## 2020-10-29 NOTE — PLAN OF CARE
Patient with recent discitis secondary to MRSA bacteremia.  On 10/16/20, aspiration biopsy done at [facility].  Transferred here 10/20/20 for long-term antibiotics and SNF for debility/PT.  Patient on IV Vancomycin every 24 hours.  Next trough tonight at 2200.   PT able to get patient up to edge of bed today.    Incontinence, purewick in place for skin breakdown prevention.  Groin and inner thighs reddened, fungal lotion applied for prevention of further breakdown.  Patient VSS on 3 liters nasal cannula; afebrile.  Plan is to continue IV antibiotics while strengthening with PT.

## 2020-10-29 NOTE — PLAN OF CARE
Patient here on SNF for long-term antibiotic therapy and physical therapy.  Patient on IV Vancomycin every 24 hours.  Trough drawn yesterday was high, dose was held.  New trough will be assessed tonight.  Double lumen PICC to left brachial; one lumen patent with blood return, other lumen occluded.    Patient up to edge of bedside with PT today.  Unable to work with OT today as patient was in pain and was having trouble getting it under control.  Today, patient has had multiple soft, large, Bm's.  Frequent changes and cleaning leaving skin red and sensitive.  Medial sacral crack open/cracked.  Patient being turned every 2 hours, calmoseptine applied with each change.  Potassium level low at 3.2--40meq of potassium given.  Norco 10 given for pain as needed.  VSS.

## 2020-10-29 NOTE — NURSING
Patient verbalizes pain 9/10.  Appears to be in pain, breathing heavy 22/min.  Nothing for pain due a this time.  Spoke with EDDIE Vinson NP.  OK to give Norco 10mg early at this time.

## 2020-10-29 NOTE — PT/OT/SLP PROGRESS
"Physical Therapy Treatment    Patient Name:  Martina Betancourt   MRN:  [id number]    Recommendations:     Discharge Recommendations:  nursing facility, basic   Discharge Equipment Recommendations: lift device   Barriers to discharge: Decreased caregiver support    Assessment:     Martina Betancourt is a 70 y.o. female admitted with a medical diagnosis of Debility.  She presents with the following impairments/functional limitations:  weakness, impaired balance, impaired self care skills, impaired functional mobilty, impaired endurance, gait instability, decreased lower extremity function, decreased upper extremity function, pain, impaired cognition, decreased safety awareness. Patient tolerated fairly well PT session today. Noted gradually increase tolerance and performance on bed mobility such as rolling to sides and supine to sit with lesser physical assistance needed. Also gradually increased Static Stand tolerance using walker at bedside for ~15 seconds with max assistance and cues.    Rehab Prognosis: Fair; patient would benefit from acute skilled PT services to address these deficits and reach maximum level of function.    Recent Surgery: * No surgery found *      Plan:     During this hospitalization, patient to be seen daily to address the identified rehab impairments via gait training, therapeutic activities, therapeutic exercises and progress toward the following goals:    · Plan of Care Expires:  11/10/20    Subjective     Chief Complaint: lower back pain upon movement; bilateral knees pain upon weight bearing activity  Patient/Family Comments/goals: "to do better"  Pain/Comfort:  · Pain Rating 1: 8/10  · Location - Side 1: Bilateral  · Location - Orientation 1: lower  · Location 1: back  · Pain Addressed 1: Reposition, Cessation of Activity  · Pain Rating Post-Intervention 1: 7/10  · Pain Rating 2: 8/10  · Location - Side 2: Bilateral  · Location - Orientation 2: lower  · Location 2: knee  · Pain Addressed 2: " Cessation of Activity  · Pain Rating Post-Intervention 2: 7/10      Objective:     Communicated with patient  prior to session.  Patient found supine with oxygen, peripheral IV upon PT entry to room.     General Precautions: Standard, fall   Orthopedic Precautions:Full weight bearing   Braces: N/A     Functional Mobility:  · Bed Mobility:     · Rolling Left:  minimal/contact guard assistance and cues using bedrail  · Rolling Right: minimal/contact guard assistance and cues using bedrail  · Scooting: minimal assistance and cues  · Supine to Sit: minimal assistance and cues  · Sit to Supine: moderate assistance and cues  · Transfers:     · Sit to Stand:  maximal assistance and cues with standard walker  · Balance: Standing with RW Static: Poor for ~15 seconds tolerance with max Assistance and cues      AM-PAC 6 CLICK MOBILITY  Turning over in bed (including adjusting bedclothes, sheets and blankets)?: 3  Sitting down on and standing up from a chair with arms (e.g., wheelchair, bedside commode, etc.): 2  Moving from lying on back to sitting on the side of the bed?: 3  Moving to and from a bed to a chair (including a wheelchair)?: 2  Need to walk in hospital room?: 2  Climbing 3-5 steps with a railing?: 1  Basic Mobility Total Score: 13       Therapeutic Activities and Exercises:   Body Mechanics trng on bed mobility and transfers sit to stand  With Std Walker    Patient left supine with all lines intact, call button in reach and nursing notified..    GOALS:   Multidisciplinary Problems     Physical Therapy Goals        Problem: Physical Therapy Goal    Goal Priority Disciplines Outcome Goal Variances Interventions   Physical Therapy Goal     PT, PT/OT Ongoing, Progressing     Description: Goals to be met by: 11/10/20    Patient will increase functional independence with mobility by performin. Rolling to sides using bed rail with contact guard assistance and cues  2. Supine to sit with minimal assistance and  cues  3. Sit to supine with moderate assistance and cues  4. Tolerate Static Sit at side of the bed for 10 minutes with Standby Assistance  5. Bed to chair transfer with moderate assistance and cues using Slide Board TECHNIQUE  6. Lower extremity exercise program x15 reps per handout, with assistance as needed                      Time Tracking:     PT Received On: 10/29/20  PT Start Time: 1412     PT Stop Time: 1428  PT Total Time (min): 16 min     Billable Minutes: Therapeutic Activity 16    Treatment Type: Treatment  PT/PTA: PT     PTA Visit Number: 0     Edgar Lamas, PT  10/29/2020

## 2020-10-29 NOTE — PLAN OF CARE
Problem: Physical Therapy Goal  Goal: Physical Therapy Goal  Description: Goals to be met by: 11/10/20    Patient will increase functional independence with mobility by performin. Rolling to sides using bed rail with contact guard assistance and cues  2. Supine to sit with minimal assistance and cues  3. Sit to supine with moderate assistance and cues  4. Tolerate Static Sit at side of the bed for 10 minutes with Standby Assistance - met 10/29/20  5. Bed to chair transfer with moderate assistance and cues using Slide Board TECHNIQUE  6. Lower extremity exercise program x15 reps per handout, with assistance as needed     Outcome: Ongoing, Progressing

## 2020-10-29 NOTE — PLAN OF CARE
Patient Agrees and Compliant with Plan of Care; Swing Patient;  Maintain Pain Regimen;No c/o Pain this shift;  Monitor Vital Signs; Vital Signs stable.  Maintain I/O's; Patient with Purwick which dislocates from time to time; Diaper changed .Purwick changed this shift.  Administer IV Antibiotic As ordered; Vancomycin held this shift.  Monitor Vancomycin Levels; Vancomycin Trough this shift was 22.3;   Next Vancomycin level to be drawn at 10PM 10/29/20. Pharmacy dosing Vancomycin.  Daily weights;  Maintain Skin integrity; Note patient has redness in folds of skin; under breast and perineal area; all areas cleaned and dried; Miconazole to areas.  PT and OT for debility ; Patient encouraged to shift weight in bed. Encouraged to feed and hold her glass when drinking.  IS Encouraged;  Fall Precautions; Maintain Patient Safety; No falls or injury noted this shift.

## 2020-10-29 NOTE — PROGRESS NOTES
Pharmacokinetic Assessment Follow Up: IV Vancomycin    Vancomycin serum concentration assessment(s):    The trough level was drawn correctly and can be used to guide therapy at this time. The measurement is above the desired definitive target range of 15 to 20 mcg/mL.    Vancomycin Regimen Plan:    Re-dose when the random level is less than 20 mcg/mL, next level to be drawn at 10-29 on 22:00 .  Possible accumulation happening.      Drug levels (last 3 results):  Recent Labs   Lab Result Units 10/26/20  2154 10/28/20  2159   Vancomycin-Trough ug/mL 21.2 22.3*       Pharmacy will continue to follow and monitor vancomycin.    Please contact pharmacy at extension [phone] for questions regarding this assessment.    Thank you for the consult,   Christopher Rivera       Patient brief summary:  Martina Betancourt is a 70 y.o. female initiated on antimicrobial therapy with IV Vancomycin for treatment of bone/joint infection    The patient's current regimen is 1500 q24    Drug Allergies:   Review of patient's allergies indicates:   Allergen Reactions    Hydroxyzine hcl     Tizanidine        Actual Body Weight:   132.5 kg    Renal Function:   Estimated Creatinine Clearance: 94.4 mL/min (based on SCr of 0.8 mg/dL).,     Dialysis Method (if applicable):  N/A    CBC (last 72 hours):  Recent Labs   Lab Result Units 10/26/20  0555 10/27/20  0611   WBC K/uL 12.90* 9.71   Hemoglobin g/dL 9.9* 9.7*   Hematocrit % 34.2* 33.2*   Platelets K/uL 285 304   Gran % % 72.3 70.8   Lymph % % 12.8* 14.9*   Mono % % 12.0 10.2   Eosinophil % % 2.3 3.4   Basophil % % 0.2 0.3   Differential Method  Automated Automated       Metabolic Panel (last 72 hours):  Recent Labs   Lab Result Units 10/26/20  0555 10/28/20  2159   Sodium mmol/L 140 140   Potassium mmol/L 3.9 3.4*   Chloride mmol/L 99 99   CO2 mmol/L 32* 29   Glucose mg/dL 84 83   BUN mg/dL 18 10   Creatinine mg/dL 0.8 0.8   Magnesium mg/dL 2.0  --    Phosphorus mg/dL 2.8  --        Vancomycin  Administrations:  vancomycin given in the last 96 hours                     vancomycin 1.5 g in dextrose 5 % 250 mL IVPB (ready to mix) (mg) 1,500 mg New Bag 10/27/20 2228     1,500 mg New Bag 10/26/20 2301    vancomycin 1750 mg in 0.9% sodium chloride 500 mL IVPB (mg) 1,750 mg New Bag 10/25/20 2300                    Microbiologic Results:  Microbiology Results (last 7 days)       Procedure Component Value Units Date/Time    E. coli 0157 antigen [[id number]] Collected: 10/26/20 1827    Order Status: Completed Specimen: Stool Updated: 10/28/20 1213     Shiga Toxin 1 E.coli Negative     Shiga Toxin 2 E.coli Negative    Stool culture [[id number]] Collected: 10/26/20 1827    Order Status: Completed Specimen: Stool Updated: 10/28/20 1047     Stool Culture Nothing significant to date    Clostridium difficile EIA [[id number]] Collected: 10/26/20 1827    Order Status: Canceled Specimen: Stool

## 2020-10-30 PROBLEM — R05.8 COUGH PRODUCTIVE OF PURULENT SPUTUM: Status: ACTIVE | Noted: 2020-10-30

## 2020-10-30 LAB — BACTERIA STL CULT: NORMAL

## 2020-10-30 PROCEDURE — 27000221 HC OXYGEN, UP TO 24 HOURS

## 2020-10-30 PROCEDURE — A4216 STERILE WATER/SALINE, 10 ML: HCPCS | Performed by: INTERNAL MEDICINE

## 2020-10-30 PROCEDURE — 94761 N-INVAS EAR/PLS OXIMETRY MLT: CPT

## 2020-10-30 PROCEDURE — 25000003 PHARM REV CODE 250: Performed by: NURSE PRACTITIONER

## 2020-10-30 PROCEDURE — 63600175 PHARM REV CODE 636 W HCPCS: Performed by: FAMILY MEDICINE

## 2020-10-30 PROCEDURE — 25000003 PHARM REV CODE 250: Performed by: FAMILY MEDICINE

## 2020-10-30 PROCEDURE — 25000242 PHARM REV CODE 250 ALT 637 W/ HCPCS: Performed by: NURSE PRACTITIONER

## 2020-10-30 PROCEDURE — 11000004 HC SNF PRIVATE

## 2020-10-30 PROCEDURE — 97110 THERAPEUTIC EXERCISES: CPT

## 2020-10-30 PROCEDURE — 99307 PR NURSING FAC CARE, SUBSEQ, IMPROVING: ICD-10-PCS | Mod: ,,, | Performed by: FAMILY MEDICINE

## 2020-10-30 PROCEDURE — 25000003 PHARM REV CODE 250: Performed by: INTERNAL MEDICINE

## 2020-10-30 PROCEDURE — 99307 SBSQ NF CARE SF MDM 10: CPT | Mod: ,,, | Performed by: FAMILY MEDICINE

## 2020-10-30 PROCEDURE — 94640 AIRWAY INHALATION TREATMENT: CPT

## 2020-10-30 PROCEDURE — 94799 UNLISTED PULMONARY SVC/PX: CPT

## 2020-10-30 PROCEDURE — 97530 THERAPEUTIC ACTIVITIES: CPT

## 2020-10-30 PROCEDURE — 99900035 HC TECH TIME PER 15 MIN (STAT)

## 2020-10-30 RX ORDER — IPRATROPIUM BROMIDE AND ALBUTEROL SULFATE 2.5; .5 MG/3ML; MG/3ML
3 SOLUTION RESPIRATORY (INHALATION)
Status: DISCONTINUED | OUTPATIENT
Start: 2020-10-30 | End: 2020-11-27

## 2020-10-30 RX ORDER — GUAIFENESIN 600 MG/1
600 TABLET, EXTENDED RELEASE ORAL 2 TIMES DAILY
Status: DISCONTINUED | OUTPATIENT
Start: 2020-10-30 | End: 2020-12-01 | Stop reason: HOSPADM

## 2020-10-30 RX ORDER — POTASSIUM CHLORIDE 20 MEQ/1
40 TABLET, EXTENDED RELEASE ORAL ONCE
Status: COMPLETED | OUTPATIENT
Start: 2020-10-30 | End: 2020-10-30

## 2020-10-30 RX ADMIN — PRAVASTATIN SODIUM 40 MG: 40 TABLET ORAL at 09:10

## 2020-10-30 RX ADMIN — Medication 10 ML: at 05:10

## 2020-10-30 RX ADMIN — MICONAZOLE NITRATE: 20 OINTMENT TOPICAL at 08:10

## 2020-10-30 RX ADMIN — GUAIFENESIN 600 MG: 600 TABLET, EXTENDED RELEASE ORAL at 09:10

## 2020-10-30 RX ADMIN — CLOPIDOGREL 75 MG: 75 TABLET, FILM COATED ORAL at 08:10

## 2020-10-30 RX ADMIN — METOPROLOL SUCCINATE 50 MG: 50 TABLET, EXTENDED RELEASE ORAL at 08:10

## 2020-10-30 RX ADMIN — VANCOMYCIN HYDROCHLORIDE 1000 MG: 1 INJECTION, POWDER, LYOPHILIZED, FOR SOLUTION INTRAVENOUS at 11:10

## 2020-10-30 RX ADMIN — ACETAMINOPHEN 500 MG: 500 TABLET, FILM COATED ORAL at 03:10

## 2020-10-30 RX ADMIN — Medication 10 ML: at 12:10

## 2020-10-30 RX ADMIN — FERROUS SULFATE TAB 325 MG (65 MG ELEMENTAL FE) 325 MG: 325 (65 FE) TAB at 09:10

## 2020-10-30 RX ADMIN — DULOXETINE 60 MG: 30 CAPSULE, DELAYED RELEASE ORAL at 08:10

## 2020-10-30 RX ADMIN — METHOCARBAMOL TABLETS 500 MG: 500 TABLET, COATED ORAL at 05:10

## 2020-10-30 RX ADMIN — MICONAZOLE NITRATE: 20 OINTMENT TOPICAL at 09:10

## 2020-10-30 RX ADMIN — HYDROCODONE BITARTRATE AND ACETAMINOPHEN 1 TABLET: 10; 325 TABLET ORAL at 09:10

## 2020-10-30 RX ADMIN — ACETAMINOPHEN 500 MG: 500 TABLET, FILM COATED ORAL at 08:10

## 2020-10-30 RX ADMIN — Medication 10 ML: at 01:10

## 2020-10-30 RX ADMIN — ACETAMINOPHEN 500 MG: 500 TABLET, FILM COATED ORAL at 09:10

## 2020-10-30 RX ADMIN — ISOSORBIDE MONONITRATE 60 MG: 60 TABLET, EXTENDED RELEASE ORAL at 08:10

## 2020-10-30 RX ADMIN — LISINOPRIL 10 MG: 10 TABLET ORAL at 08:10

## 2020-10-30 RX ADMIN — METHOCARBAMOL TABLETS 500 MG: 500 TABLET, COATED ORAL at 01:10

## 2020-10-30 RX ADMIN — HYDROCODONE BITARTRATE AND ACETAMINOPHEN 1 TABLET: 10; 325 TABLET ORAL at 03:10

## 2020-10-30 RX ADMIN — HYDROCODONE BITARTRATE AND ACETAMINOPHEN 1 TABLET: 10; 325 TABLET ORAL at 08:10

## 2020-10-30 RX ADMIN — METHOCARBAMOL TABLETS 500 MG: 500 TABLET, COATED ORAL at 08:10

## 2020-10-30 RX ADMIN — FUROSEMIDE 40 MG: 40 TABLET ORAL at 08:10

## 2020-10-30 RX ADMIN — MEMANTINE 10 MG: 10 TABLET ORAL at 09:10

## 2020-10-30 RX ADMIN — IPRATROPIUM BROMIDE AND ALBUTEROL SULFATE 3 ML: .5; 3 SOLUTION RESPIRATORY (INHALATION) at 07:10

## 2020-10-30 RX ADMIN — METHOCARBAMOL TABLETS 500 MG: 500 TABLET, COATED ORAL at 09:10

## 2020-10-30 RX ADMIN — GUAIFENESIN 600 MG: 600 TABLET, EXTENDED RELEASE ORAL at 01:10

## 2020-10-30 RX ADMIN — POTASSIUM CHLORIDE 40 MEQ: 1500 TABLET, EXTENDED RELEASE ORAL at 01:10

## 2020-10-30 RX ADMIN — MEMANTINE 10 MG: 10 TABLET ORAL at 08:10

## 2020-10-30 RX ADMIN — IPRATROPIUM BROMIDE AND ALBUTEROL SULFATE 3 ML: .5; 3 SOLUTION RESPIRATORY (INHALATION) at 01:10

## 2020-10-30 RX ADMIN — PANTOPRAZOLE SODIUM 40 MG: 40 TABLET, DELAYED RELEASE ORAL at 05:10

## 2020-10-30 RX ADMIN — MELATONIN TAB 3 MG 6 MG: 3 TAB at 09:10

## 2020-10-30 RX ADMIN — ASPIRIN 81 MG: 81 TABLET, COATED ORAL at 08:10

## 2020-10-30 RX ADMIN — DONEPEZIL HYDROCHLORIDE 10 MG: 5 TABLET, FILM COATED ORAL at 09:10

## 2020-10-30 NOTE — ASSESSMENT & PLAN NOTE
"Productive cough this am per nurse- " pale green, grey"   Add mucinex and give neb tx q 6 while awake  No fever, WBC normal   Increase activity       "

## 2020-10-30 NOTE — PLAN OF CARE
Problem: Physical Therapy Goal  Goal: Physical Therapy Goal  Description: Goals to be met by: 11/10/20    Patient will increase functional independence with mobility by performin. Rolling to sides using bed rail with contact guard assistance and cues  2. Supine to sit with minimal assistance and cues  3. Sit to supine with moderate assistance and cues  4. Tolerate Static Sit at side of the bed for 10 minutes with Standby Assistance - met 10/29/20   Updated 10/30/20:         Tolerate Static Stand using Std Walker  for ~ 1 - 2 minutes with max/moderate assistance and cues  5. Bed to chair transfer with moderate assistance and cues using Slide Board TECHNIQUE  6. Lower extremity exercise program x15 reps per handout, with assistance as needed     Outcome: Ongoing, Progressing

## 2020-10-30 NOTE — SUBJECTIVE & OBJECTIVE
Review of Systems   Constitutional: Negative for activity change, fatigue, fever and unexpected weight change.   HENT: Negative for congestion, ear pain, hearing loss, rhinorrhea and sore throat.    Eyes: Negative for redness and visual disturbance.   Respiratory: Negative for cough, shortness of breath and wheezing.    Cardiovascular: Negative for chest pain, palpitations and leg swelling.   Gastrointestinal: Negative for abdominal pain, constipation, diarrhea (watery, non-bloody), nausea and vomiting.   Genitourinary: Negative for dysuria, frequency and urgency.   Musculoskeletal: Negative for back pain, joint swelling and neck pain.   Skin: Negative for color change, rash and wound.   Neurological: Negative for dizziness, tremors, weakness, light-headedness and headaches.   Psychiatric/Behavioral: Positive for confusion and decreased concentration.     Objective:     Vital Signs (Most Recent):  Temp: 98 °F (36.7 °C) (10/30/20 0743)  Pulse: 67 (10/30/20 0743)  Resp: 19 (10/30/20 0743)  BP: 138/62 (10/30/20 0743)  SpO2: 95 % (10/30/20 0743) Vital Signs (24h Range):  Temp:  [96.9 °F (36.1 °C)-98 °F (36.7 °C)] 98 °F (36.7 °C)  Pulse:  [66-67] 67  Resp:  [19-22] 19  SpO2:  [95 %-96 %] 95 %  BP: (119-138)/(59-62) 138/62     Weight: 133.7 kg (294 lb 12.1 oz)  Body mass index is 42.29 kg/m².    Physical Exam  Vitals signs and nursing note reviewed.   Constitutional:       General: She is not in acute distress.     Appearance: She is well-developed. She is obese.   HENT:      Head: Normocephalic and atraumatic.      Right Ear: External ear normal.      Left Ear: External ear normal.      Nose: Nose normal.      Mouth/Throat:      Mouth: Mucous membranes are moist.      Pharynx: Oropharynx is clear.   Eyes:      General:         Right eye: No discharge.         Left eye: No discharge.      Extraocular Movements: Extraocular movements intact.      Conjunctiva/sclera: Conjunctivae normal.      Pupils: Pupils are equal,  round, and reactive to light.   Neck:      Musculoskeletal: Neck supple.      Thyroid: No thyromegaly.   Cardiovascular:      Rate and Rhythm: Normal rate and regular rhythm.      Heart sounds: No murmur.   Pulmonary:      Effort: Pulmonary effort is normal. No respiratory distress.      Breath sounds: Rhonchi present. No wheezing or rales.   Abdominal:      General: Bowel sounds are normal. There is no distension.      Palpations: Abdomen is soft.      Tenderness: There is no abdominal tenderness.   Musculoskeletal:      Right lower leg: No edema.      Left lower leg: No edema.   Lymphadenopathy:      Cervical: No cervical adenopathy.   Skin:     General: Skin is warm and dry.   Neurological:      General: No focal deficit present.      Mental Status: She is alert.      Cranial Nerves: No cranial nerve deficit.      Comments: Obese, not very cooperative with PT.  Max assistance   Psychiatric:         Behavior: Behavior normal.           CRANIAL NERVES     CN III, IV, VI   Pupils are equal, round, and reactive to light.       Significant Labs:   CBC:   Recent Labs   Lab 10/29/20  0412   WBC 8.44   HGB 9.9*   HCT 34.2*        BMP  Lab Results   Component Value Date     10/29/2020    K 3.2 (L) 10/29/2020    CL 98 10/29/2020    CO2 31 (H) 10/29/2020    BUN 10 10/29/2020    CREATININE 0.8 10/29/2020    CALCIUM 9.9 10/29/2020    ANIONGAP 12 10/29/2020    ESTGFRAFRICA >60 10/29/2020    EGFRNONAA >60 10/29/2020     10/23 vanc trough 20.5    Body Fluid Type  Other (Specify)    Comment: T9-T10   Fluid Appearance  Clear    Fluid Color  Icteric    WBC, Body Fluid /cu mm 10    Comment: Reference ranges for body fluids not established.   Correlate clinically.    Segs, Fluid % 65    Lymphs, Fluid % 23    Monocytes/Macrophages, Fluid % 12    Aerobic culture no growth   Fungal culture in process  10/8 blood culture NGTD x 5    9/23 METHICILLIN RESISTANT STAPHYLOCOCCUS AUREUS  Clindamycin <=0.5 mcg/mL Sensitive       Erythromycin <=0.5 mcg/mL Sensitive     Oxacillin >2 mcg/mL Resistant     Penicillin 8 mcg/mL Resistant     Tetracycline <=4 mcg/mL Sensitive     Trimeth/Sulfa <=0.5/9.5 m... Sensitive     Vancomycin 2 mcg/mL Sensitive      Significant Imaging:     10/19 CXR There is cardiomegaly.  Lungs are clear.  Central line tip upper SVC    10/13 CT lumbar   Findings above consistent with T9-T10 discitis/osteomyelitis with multilocular paravertebral abscesses, better evaluated on MRI 10/12/2020.  No significant retropulsion osseous fragments into the canal.     Multilevel lumbar spondylosis resulting in moderate to severe spinal canal stenosis from L2-L3 through L4-L5 and severe bilateral neural foraminal narrowing at L5-S1.     Mild multilevel thoracic spondylosis, detailed above.     Bilateral pleural effusions, larger on the left with compressive atelectasis/volume loss of adjacent lung parenchyma.     Moderate-large size hiatal hernia     Multi-vessel coronary artery atherosclerosis.     10/13 CT thoracic spine   Findings above consistent with T9-T10 discitis/osteomyelitis with multilocular paravertebral abscesses, better evaluated on MRI 10/12/2020.  No significant retropulsion osseous fragments into the canal.     Multilevel lumbar spondylosis resulting in moderate to severe spinal canal stenosis from L2-L3 through L4-L5 and severe bilateral neural foraminal narrowing at L5-S1.     Mild multilevel thoracic spondylosis, detailed above.     Bilateral pleural effusions, larger on the left with compressive atelectasis/volume loss of adjacent lung parenchyma.     Moderate-large size hiatal hernia     Multi-vessel coronary artery atherosclerosis.    10/12 MRI thoracic Discitis/osteomyelitis at the T9-T10 level with multilocular paravertebral abscesses similar to prior exam.     T8-T10 epidural phlegmon results in moderate narrowing of the thecal sac. There is associated nerve root enhancement, which may be seen with  arachnoiditis.     Bilateral complex pleural effusions with enhancement.  Empyema is a consideration.  Consider further evaluation with contrast enhanced chest CT.     Multilevel mild degenerative changes and disc bulges in the thoracic spine, most prominent at T5-T6.    10/12 MRI lumbar No evidence for spondylodiscitis.     Bilateral L5 spondylolysis with grade 2 anterolisthesis.     Multilevel degenerative changes of the lumbar spine detailed above.  Moderate to severe spinal canal stenosis noted at L2-L5.  Severe neural foraminal narrowing noted at L5-S1.    9/23 EKG Normal sinus rhythm  Nonspecific ST and T wave abnormality  Abnormal ECG  When compared with ECG of 10-AUG-2020 06:42,  Nonspecific T wave abnormality now evident in Lateral leads  Confirmed by Willie Matthews MD ([id number]) on 9/23/2020 3:05:10 PM

## 2020-10-30 NOTE — PT/OT/SLP PROGRESS
Occupational Therapy   Treatment    Name: Martina Betancourt  MRN: [id number]  Admitting Diagnosis:  Debility       Recommendations:     Discharge Recommendations: nursing facility, basic  Discharge Equipment Recommendations:  lift device  Barriers to discharge:       Assessment:     Martina Betancourt is a 70 y.o. female with a medical diagnosis of Debility.  She presents with fatigue today after PT session but willing to participate in bed level UE ROM. Performance deficits affecting function are weakness, impaired balance, decreased safety awareness, impaired skin, impaired endurance, pain, impaired cognition, impaired self care skills, decreased coordination, impaired functional mobilty, decreased upper extremity function, impaired coordination, gait instability, decreased lower extremity function.     Rehab Prognosis:  Fair; patient would benefit from acute skilled OT services to address these deficits and reach maximum level of function.       Plan:     Patient to be seen 5 x/week to address the above listed problems via self-care/home management, therapeutic activities, therapeutic exercises  · Plan of Care Expires: 11/04/20  · Plan of Care Reviewed with: patient    Subjective     Patient comment: patient verbalizing desire to vote in presidential election next week, nursing and social work informed, social work reports will follow up    Pain/Comfort:  · Pain Rating 1: 8/10  · Location - Orientation 1: lower  · Location 1: back  · Pain Addressed 1: Reposition  · Pain Rating Post-Intervention 1: 8/10    Objective:     Communicated with: Nursing prior to session.  Patient found supine with oxygen, peripheral IV, PureWick upon OT entry to room.    General Precautions: Standard, fall   Orthopedic Precautions:Full weight bearing   Braces: N/A     Occupational Performance:   Not completed      [facility] 6 Click ADL: 11    Treatment & Education:  Therapist facilitated L shoulder flexion/extension, B elbow flexion/extension,  and B digit flexion/extension 3 sets x 10 reps each with verbal cues for pacing and body mechanics     Patient left supine with all lines intact, call button in reach and nursing/ social work notifiedEducation:      GOALS:   Multidisciplinary Problems     Occupational Therapy Goals        Problem: Occupational Therapy Goal    Goal Priority Disciplines Outcome Interventions   Occupational Therapy Goal     OT, PT/OT Ongoing, Progressing    Description: Goals to be met by: 11/4/2020     Patient will increase functional independence with ADLs by performing:    Feeding with Supervision.  UE Dressing with Supervision.  LE Dressing with Moderate Assistance.  Grooming while seated with Supervision.  Toileting from bedside commode with Minimal Assistance for hygiene and clothing management.   Sitting at edge of bed x5 minutes with Supervision.  Rolling to Bilateral with Supervision.   Supine to sit with Minimal Assistance.  Squat pivot transfers with Minimal Assistance.  Toilet transfer to bedside commode with Minimal Assistance.  Upper extremity exercise program x10 reps per handout, with assistance as needed.                     Time Tracking:     OT Date of Treatment: 10/30/20  OT Start Time: 1623  OT Stop Time: 1638  OT Total Time (min): 15 min    Billable Minutes:Therapeutic Exercise 15 minutes    Golden Bartlett OT  10/30/2020

## 2020-10-30 NOTE — PLAN OF CARE
Patient Agrees and Compliant with Plan of Care: Swing Patient,  Maintain Pain Regimen; No c/o pain thus far this shift.  Monitor Breathing Pattern; No c/o shortness of breath noted this shift; Patient remains on Oxygen at 3L nc with oxygen saturation of 96%. Bilateral Breath sounds auscultated posteriorly and diminished throughout; IS encouraged.  Maintain skin integrity; Note patient has redness in folds of abdomen and under breast; Sacral area red. Areas cleaned and dried; Moisture and fungal barrier to areas. Patient assisted with repositioning Q 2 hrs.  Maintain I/O's; Patient with Purwick which dislodges periodically with repositioning.   Daily weights.  Administer IV Antibiotic as ordered; Patient received Vancomycin 1gm this shift per pharmacy's order. Vancomycin trough was12.5 prior to dose.  Monitor PICC line; No redness or swelling noted to line; Note unable to flush Purple Port. Red port with good blood return and flushes well.  PT /OT  consulted ; Patient appears to be getting a little stronger. Encouraging patient to assist with care.   Fall Precautions; Maintain Patient Safety; No falls or injury noted this shift. Bed alarm in use.

## 2020-10-30 NOTE — ASSESSMENT & PLAN NOTE
Increase lisinopril 2.5>10mg daily  10/23 SBP 160s at times; lisinopril just increased will give more time for lisinopril to work before titration  10/26 SBP 140s; cont to monitor  10/28 /80- increase lisinopril  10/30 BP much better 119/59- 138/62

## 2020-10-30 NOTE — PROGRESS NOTES
[facility] Medicine  Progress Note    Patient Name: Martina Betancourt  MRN: [id number]  Patient Class: IP- Swing   Admission Date: 10/20/2020  Length of Stay: 10 days  Attending Physician: Vamsi Manuel MD  Primary Care Provider: Joe Fuller Iii, MD        Subjective:     Principal Problem:Debility        HPI:  69yo female patient with hx of alzheiners, CAD, HLD, HTN, myopathy, MI, obesity, sleep apnea and OA (knees non ambulatory uses gonzalez round). She was living at home with her daughter. Recently admitted to [facility] with MRSA bacteremia and subsequent pneumonia treated with Zyvox x 7 days with clearance of her bacteremia now admitted with back pain found to have T9-10 osteo discitis, T8-10 epidural phlegmon with associated paraverterbral abscesses. Spine infection likely hematogenous from under treated bacteremia. Neurosurgery has been consulted. She has been on Vancomycin and Ceftriaxone. Underwent T9-10 disc space biopsy with IR on 10/16. Cultures negative, though had been on IV antibiotics multiple days prior. Afebrile. HDS. Repeat blood cx sterile. ID rec cont vanc 1750mg q 24 till 12/3.     Overview/Hospital Course:  10/23 Here for skilled ; needing IV abx for spinal abscess;  vanc 1750mg q 24 till 12/3  Afebrile , 3LNC - O2 sat 95%   + debility, very deconditioned; bilt LE x 10 reps followed by bed mobility trng and static sit balance and tolerance at side of the bed  C/o back pain with activity; Occupational therapist notes that she is very resistant to movement and c/o severe pain with minimal activity.   Will order toradol 15mg x 1dose IV; pt did much better after toradol rx today per OT. Will order daily prior to OT as tolerated  Monitor renal fx     10/26 here for skilled therapy and cont IV abc. Vanc 1750mg iv every 12hr. Noted elevated WBC this am and she report that she has had diarrhea for the last 4 days.   · PT reports Supine to Sit: maximal assistance, of 2  persons and patient able to reach with UE to push through rail and with improved push off with UE and initiation  · Sit to Supine: maximal assistance of 2 people with improved ability of patient to control descent of upper body  · Transfers:     · Sit to Stand:  moderate assistance, maximal assistance of 2 persons with no AD.  PT and PT tech blocking both knees while assisting pt to elevate hips from bed  Balance: pt able to stand x15 seconds EOB limited largely by knee and back pain.       10/28  She is still on vanc IV daily. No longer with diarrhea. Did have heme positive stools H/H stable on lovenox for DVT prophylaxis and plavix. Will monitor h/h M/Thurs. No fever. No elevated WBC  pt is really immobile.  · Bed Mobility:     · Rolling Left:  moderate assistance  · Rolling Right: moderate assistance  · Scooting: maximal assistance  · Supine to Sit: maximum/moderate assistace and cues  · Sit to Supine: maximum assistace x 1-2 and cues  · Transfers:     · Sit to Stand:  maximum assistance and cues inside the parallel bars with facilitation tech  · Bed to Chair: to wheelchair  with  maximum assistance and cues  using  Slide Board  · Balance: Standing Static inside the parallel bars with Poor grade. Tolerated for ~10 seconds with 2 attempts with  Maximum assistance and cues.  Cont PT daily  10/30  IV  vanc 1,000mg q 24hr x 8 weeks total till 12/3 per ID for discitis; random vanc 12.5 yesterday    No longer with diarrhea. Did have heme positive stools H/H stable on lovenox for DVT prophylaxis and plavix. H&H stable, lovenox stopped. No fever. No elevated WBC. She has productive cough this am. K+ 3.2 yesterday, getting lasix, will repeat KCL 40meq today   pt is really immobile.Standing with RW Static: Poor for ~15 seconds tolerance with max Assistance and cues  Has PICC line- one port clotted;       Review of Systems   Constitutional: Negative for activity change, fatigue, fever and unexpected weight change.   HENT:  Negative for congestion, ear pain, hearing loss, rhinorrhea and sore throat.    Eyes: Negative for redness and visual disturbance.   Respiratory: Negative for cough, shortness of breath and wheezing.    Cardiovascular: Negative for chest pain, palpitations and leg swelling.   Gastrointestinal: Negative for abdominal pain, constipation, diarrhea (watery, non-bloody), nausea and vomiting.   Genitourinary: Negative for dysuria, frequency and urgency.   Musculoskeletal: Negative for back pain, joint swelling and neck pain.   Skin: Negative for color change, rash and wound.   Neurological: Negative for dizziness, tremors, weakness, light-headedness and headaches.   Psychiatric/Behavioral: Positive for confusion and decreased concentration.     Objective:     Vital Signs (Most Recent):  Temp: 98 °F (36.7 °C) (10/30/20 0743)  Pulse: 67 (10/30/20 0743)  Resp: 19 (10/30/20 0743)  BP: 138/62 (10/30/20 0743)  SpO2: 95 % (10/30/20 0743) Vital Signs (24h Range):  Temp:  [96.9 °F (36.1 °C)-98 °F (36.7 °C)] 98 °F (36.7 °C)  Pulse:  [66-67] 67  Resp:  [19-22] 19  SpO2:  [95 %-96 %] 95 %  BP: (119-138)/(59-62) 138/62     Weight: 133.7 kg (294 lb 12.1 oz)  Body mass index is 42.29 kg/m².    Physical Exam  Vitals signs and nursing note reviewed.   Constitutional:       General: She is not in acute distress.     Appearance: She is well-developed. She is obese.   HENT:      Head: Normocephalic and atraumatic.      Right Ear: External ear normal.      Left Ear: External ear normal.      Nose: Nose normal.      Mouth/Throat:      Mouth: Mucous membranes are moist.      Pharynx: Oropharynx is clear.   Eyes:      General:         Right eye: No discharge.         Left eye: No discharge.      Extraocular Movements: Extraocular movements intact.      Conjunctiva/sclera: Conjunctivae normal.      Pupils: Pupils are equal, round, and reactive to light.   Neck:      Musculoskeletal: Neck supple.      Thyroid: No thyromegaly.   Cardiovascular:       Rate and Rhythm: Normal rate and regular rhythm.      Heart sounds: No murmur.   Pulmonary:      Effort: Pulmonary effort is normal. No respiratory distress.      Breath sounds: Rhonchi present. No wheezing or rales.   Abdominal:      General: Bowel sounds are normal. There is no distension.      Palpations: Abdomen is soft.      Tenderness: There is no abdominal tenderness.   Musculoskeletal:      Right lower leg: No edema.      Left lower leg: No edema.   Lymphadenopathy:      Cervical: No cervical adenopathy.   Skin:     General: Skin is warm and dry.   Neurological:      General: No focal deficit present.      Mental Status: She is alert.      Cranial Nerves: No cranial nerve deficit.      Comments: Obese, not very cooperative with PT.  Max assistance   Psychiatric:         Behavior: Behavior normal.           CRANIAL NERVES     CN III, IV, VI   Pupils are equal, round, and reactive to light.       Significant Labs:   CBC:   Recent Labs   Lab 10/29/20  0412   WBC 8.44   HGB 9.9*   HCT 34.2*        BMP  Lab Results   Component Value Date     10/29/2020    K 3.2 (L) 10/29/2020    CL 98 10/29/2020    CO2 31 (H) 10/29/2020    BUN 10 10/29/2020    CREATININE 0.8 10/29/2020    CALCIUM 9.9 10/29/2020    ANIONGAP 12 10/29/2020    ESTGFRAFRICA >60 10/29/2020    EGFRNONAA >60 10/29/2020     10/23 vanc trough 20.5    Body Fluid Type  Other (Specify)    Comment: T9-T10   Fluid Appearance  Clear    Fluid Color  Icteric    WBC, Body Fluid /cu mm 10    Comment: Reference ranges for body fluids not established.   Correlate clinically.    Segs, Fluid % 65    Lymphs, Fluid % 23    Monocytes/Macrophages, Fluid % 12    Aerobic culture no growth   Fungal culture in process  10/8 blood culture NGTD x 5    9/23 METHICILLIN RESISTANT STAPHYLOCOCCUS AUREUS  Clindamycin <=0.5 mcg/mL Sensitive      Erythromycin <=0.5 mcg/mL Sensitive     Oxacillin >2 mcg/mL Resistant     Penicillin 8 mcg/mL Resistant     Tetracycline <=4  mcg/mL Sensitive     Trimeth/Sulfa <=0.5/9.5 m... Sensitive     Vancomycin 2 mcg/mL Sensitive      Significant Imaging:     10/19 CXR There is cardiomegaly.  Lungs are clear.  Central line tip upper SVC    10/13 CT lumbar   Findings above consistent with T9-T10 discitis/osteomyelitis with multilocular paravertebral abscesses, better evaluated on MRI 10/12/2020.  No significant retropulsion osseous fragments into the canal.     Multilevel lumbar spondylosis resulting in moderate to severe spinal canal stenosis from L2-L3 through L4-L5 and severe bilateral neural foraminal narrowing at L5-S1.     Mild multilevel thoracic spondylosis, detailed above.     Bilateral pleural effusions, larger on the left with compressive atelectasis/volume loss of adjacent lung parenchyma.     Moderate-large size hiatal hernia     Multi-vessel coronary artery atherosclerosis.     10/13 CT thoracic spine   Findings above consistent with T9-T10 discitis/osteomyelitis with multilocular paravertebral abscesses, better evaluated on MRI 10/12/2020.  No significant retropulsion osseous fragments into the canal.     Multilevel lumbar spondylosis resulting in moderate to severe spinal canal stenosis from L2-L3 through L4-L5 and severe bilateral neural foraminal narrowing at L5-S1.     Mild multilevel thoracic spondylosis, detailed above.     Bilateral pleural effusions, larger on the left with compressive atelectasis/volume loss of adjacent lung parenchyma.     Moderate-large size hiatal hernia     Multi-vessel coronary artery atherosclerosis.    10/12 MRI thoracic Discitis/osteomyelitis at the T9-T10 level with multilocular paravertebral abscesses similar to prior exam.     T8-T10 epidural phlegmon results in moderate narrowing of the thecal sac. There is associated nerve root enhancement, which may be seen with arachnoiditis.     Bilateral complex pleural effusions with enhancement.  Empyema is a consideration.  Consider further evaluation with  contrast enhanced chest CT.     Multilevel mild degenerative changes and disc bulges in the thoracic spine, most prominent at T5-T6.    10/12 MRI lumbar No evidence for spondylodiscitis.     Bilateral L5 spondylolysis with grade 2 anterolisthesis.     Multilevel degenerative changes of the lumbar spine detailed above.  Moderate to severe spinal canal stenosis noted at L2-L5.  Severe neural foraminal narrowing noted at L5-S1.    9/23 EKG Normal sinus rhythm  Nonspecific ST and T wave abnormality  Abnormal ECG  When compared with ECG of 10-AUG-2020 06:42,  Nonspecific T wave abnormality now evident in Lateral leads  Confirmed by Willie Matthews MD ([id number]) on 9/23/2020 3:05:10 PM      Assessment/Plan:      * Debility  Was walking with walker prior to pneumonia/bacteremia admit last month then after d.c was only w/c bound (gonzalez round) will add pt here and try and get her as strong as poss as she lives at home with family.   10/23 Chronic co back ache but also has recent high ankle fx ; per EDDIE Vinson NP Spoke with Ariela VASQUES who will see patient while on SWING. She looked over x rays and hx and reports that patient can weight bear as tolerated. Nurse/OT/MD notified    · 10/26 Supine to Sit: maximal assistance, of 2 persons and patient able to reach with UE to push through rail and with improved push off with UE and initiation  · Sit to Supine: maximal assistance of 2 people with improved ability of patient to control descent of upper body  · Transfers:     · Sit to Stand:  moderate assistance, maximal assistance of 2 persons with no AD.  PT and PT tech blocking both knees while assisting pt to elevate hips from bed  Balance: pt able to stand x15 seconds EOB limited largely by knee and back pain.     10/28  · Bed Mobility:     · Rolling Left:  moderate assistance  · Rolling Right: moderate assistance  · Scooting: maximal assistance  · Supine to Sit: maximum/moderate assistace and cues  · Sit to Supine: maximum  "assistace x 1-2 and cues  · Transfers:     · Sit to Stand:  maximum assistance and cues inside the parallel bars with facilitation tech  · Bed to Chair: to wheelchair  with  maximum assistance and cues  using  Slide Board  · Balance: Standing Static inside the parallel bars with Poor grade. Tolerated for ~10 seconds with 2 attempts with  Maximum assistance and cues.  10/30 Standing with RW Static: Poor for ~15 seconds tolerance with max Assistance and cues    Cough productive of purulent sputum  Productive cough this am per nurse- " pale green, grey"   Add mucinex and give neb tx q 6 while awake  No fever, WBC normal   Increase activity         Diarrhea  Stools d/c as diarrhea has improved. + heme occult     Malnutrition of mild degree  Dietary  boost      Hydronephrosis  Cont asa, plavix, lasix, isosorbide, lisinopril, toprol and statin      CAD (coronary artery disease)  Cont asa, plavix, lasix, isosorbide, lisinopril, toprol and statin  Time to stop Lovenox      HTN (hypertension)  Increase lisinopril 2.5>10mg daily  10/23 SBP 160s at times; lisinopril just increased will give more time for lisinopril to work before titration  10/26 SBP 140s; cont to monitor  10/28 /80- increase lisinopril  10/30 BP much better 119/59- 138/62      Discitis  vanc 1450mg IV every 24hr x 8 weeks total till 12/3 per ID ; will stay here for the duration   PT    Severe obesity (BMI >= 40)  Using boost as she has low appetite and low protein       Obstructive sleep apnea treated with continuous positive airway pressure (CPAP)  Bring home cpap      Dementia without behavioral disturbance  Cont namenda and aricpet         VTE Risk Mitigation (From admission, onward)    None          Discharge Planning   BRIT:      Code Status: DNR   Is the patient medically ready for discharge?:     Reason for patient still in hospital (select all that apply): Treatment  Discharge Plan A: Home with family, Home Health                  Elisa MUIR" MD Anabelle  Department of Hospital Medicine   [facility]

## 2020-10-30 NOTE — PLAN OF CARE
Patient on IP-swing for PT/OT. Patient with c/o back pain ; hydrocodone prn pain. PICC line to Right forearm sluggish to flush. Discussed picc line with Dr. Ahn. Pure wick in use. IV vancomycin. Next vanc trough 10/31/20. Right heel paint with beatadine. In agreement with plan of care.

## 2020-10-30 NOTE — PROGRESS NOTES
Pharmacokinetic Assessment Follow Up: IV Vancomycin    Vancomycin serum concentration assessment(s):    The random level was drawn correctly and can be used to guide therapy at this time. The measurement is below the desired definitive target range of 15 to 20 mcg/mL.    Vancomycin Regimen Plan:    Change regimen to Vancomycin 1000 mg IV every 24 hours with next serum trough concentration measured at 10/31/20 prior to 3 dose on 2245    Drug levels (last 3 results):  Recent Labs   Lab Result Units 10/28/20  2159 10/29/20  2202   Vancomycin, Random ug/mL  --  12.5   Vancomycin-Trough ug/mL 22.3*  --        Pharmacy will continue to follow and monitor vancomycin.    Please contact pharmacy at extension [phone] for questions regarding this assessment.    Thank you for the consult,   Jailene Pace       Patient brief summary:  Martina Betancourt is a 70 y.o. female initiated on antimicrobial therapy with IV Vancomycin for treatment of bone/joint infection    The patient's current regimen is 1500mg q24 changed to 1g q24     Drug Allergies:   Review of patient's allergies indicates:   Allergen Reactions    Hydroxyzine hcl     Tizanidine        Actual Body Weight:   133kg    Renal Function:   Estimated Creatinine Clearance: 94.9 mL/min (based on SCr of 0.8 mg/dL).,     Dialysis Method (if applicable):  N/A    CBC (last 72 hours):  Recent Labs   Lab Result Units 10/27/20  0611 10/29/20  0412   WBC K/uL 9.71 8.44   Hemoglobin g/dL 9.7* 9.9*   Hematocrit % 33.2* 34.2*   Platelets K/uL 304 324   Gran % % 70.8 65.2   Lymph % % 14.9* 17.1*   Mono % % 10.2 14.3   Eosinophil % % 3.4 2.7   Basophil % % 0.3 0.2   Differential Method  Automated Automated       Metabolic Panel (last 72 hours):  Recent Labs   Lab Result Units 10/28/20  2159 10/29/20  0412   Sodium mmol/L 140 141   Potassium mmol/L 3.4* 3.2*   Chloride mmol/L 99 98   CO2 mmol/L 29 31*   Glucose mg/dL 83 82   BUN mg/dL 10 10   Creatinine mg/dL 0.8 0.8   Magnesium mg/dL  --   1.9   Phosphorus mg/dL  --  3.8       Vancomycin Administrations:  vancomycin given in the last 96 hours                     vancomycin 1.5 g in dextrose 5 % 250 mL IVPB (ready to mix) (mg) 1,500 mg New Bag 10/27/20 2228     1,500 mg New Bag 10/26/20 2301    vancomycin 1750 mg in 0.9% sodium chloride 500 mL IVPB (mg) 1,750 mg New Bag 10/25/20 2300                    Microbiologic Results:  Microbiology Results (last 7 days)       Procedure Component Value Units Date/Time    E. coli 0157 antigen [[id number]] Collected: 10/26/20 1827    Order Status: Completed Specimen: Stool Updated: 10/28/20 1213     Shiga Toxin 1 E.coli Negative     Shiga Toxin 2 E.coli Negative    Stool culture [[id number]] Collected: 10/26/20 1827    Order Status: Completed Specimen: Stool Updated: 10/28/20 1047     Stool Culture Nothing significant to date    Clostridium difficile EIA [[id number]] Collected: 10/26/20 1827    Order Status: Canceled Specimen: Stool

## 2020-10-30 NOTE — ASSESSMENT & PLAN NOTE
Was walking with walker prior to pneumonia/bacteremia admit last month then after d.c was only w/c bound (gonzalez round) will add pt here and try and get her as strong as poss as she lives at home with family.   10/23 Chronic co back ache but also has recent high ankle fx ; per EDDIE Vinson NP Spoke with Ariela VASQUES who will see patient while on SWING. She looked over x rays and hx and reports that patient can weight bear as tolerated. Nurse/OT/MD notified    · 10/26 Supine to Sit: maximal assistance, of 2 persons and patient able to reach with UE to push through rail and with improved push off with UE and initiation  · Sit to Supine: maximal assistance of 2 people with improved ability of patient to control descent of upper body  · Transfers:     · Sit to Stand:  moderate assistance, maximal assistance of 2 persons with no AD.  PT and PT tech blocking both knees while assisting pt to elevate hips from bed  Balance: pt able to stand x15 seconds EOB limited largely by knee and back pain.     10/28  · Bed Mobility:     · Rolling Left:  moderate assistance  · Rolling Right: moderate assistance  · Scooting: maximal assistance  · Supine to Sit: maximum/moderate assistace and cues  · Sit to Supine: maximum assistace x 1-2 and cues  · Transfers:     · Sit to Stand:  maximum assistance and cues inside the parallel bars with facilitation tech  · Bed to Chair: to wheelchair  with  maximum assistance and cues  using  Slide Board  · Balance: Standing Static inside the parallel bars with Poor grade. Tolerated for ~10 seconds with 2 attempts with  Maximum assistance and cues.  10/30 Standing with RW Static: Poor for ~15 seconds tolerance with max Assistance and cues

## 2020-10-30 NOTE — PT/OT/SLP PROGRESS
"Physical Therapy Treatment    Patient Name:  Martina Betancourt   MRN:  [id number]    Recommendations:     Discharge Recommendations:  nursing facility, basic   Discharge Equipment Recommendations: lift device   Barriers to discharge: Decreased caregiver support    Assessment:     Martina Betancourt is a 70 y.o. female admitted with a medical diagnosis of Debility.  She presents with the following impairments/functional limitations:  weakness, impaired endurance, impaired self care skills, impaired functional mobilty, pain, gait instability, decreased lower extremity function, decreased upper extremity function, impaired balance, impaired cognition, decreased safety awareness. Continued gradual inc performance on bed mobility with lesser physical assistance needed. Also noted gradual increased tolerance with static stand using Standard Walker at bedside for ~30 seconds (2 attempts) with max Assistance and cues.    Rehab Prognosis: Fair; patient would benefit from acute skilled PT services to address these deficits and reach maximum level of function.    Recent Surgery: * No surgery found *      Plan:     During this hospitalization, patient to be seen daily to address the identified rehab impairments via gait training, therapeutic activities, therapeutic exercises and progress toward the following goals:    · Plan of Care Expires:  11/10/20    Subjective     Chief Complaint: lower back pain upon transitional mov't and bilat knees pain upon weight bearing activity.  Patient/Family Comments/goals: "To keep trying my best and to return home" - per patient  Pain/Comfort:  · Pain Rating 1: 10/10  · Location - Side 1: Bilateral  · Location - Orientation 1: lower  · Location 1: back  · Pain Addressed 1: Reposition, Cessation of Activity  · Pain Rating Post-Intervention 1: 8/10  · Pain Rating 2: 7/10  · Location - Side 2: Bilateral  · Location - Orientation 2: lower  · Location 2: knee  · Pain Addressed 2: Cessation of " Activity  · Pain Rating Post-Intervention 2: 7/10      Objective:     Communicated with patient  prior to session.  Patient found supine with oxygen, peripheral IV upon PT entry to room.     General Precautions: Standard, fall   Orthopedic Precautions:Full weight bearing   Braces: N/A     Functional Mobility:  · Bed Mobility:     · Rolling Left:  stand by assistance  · Rolling Right: stand by assistance  · Scooting: minimal assistance and cues  · Supine to Sit: minimal/contact guard  assistance and cues  · Sit to Supine: moderate assistance and cues  · Transfers:     · Sit to Stand:  maximal assistance and cues with standard walker  · Balance: Standing with std walker Static: Poor+; Tolerated for ~30 seconds with maxA and cues      AM-PAC 6 CLICK MOBILITY  Turning over in bed (including adjusting bedclothes, sheets and blankets)?: 3  Sitting down on and standing up from a chair with arms (e.g., wheelchair, bedside commode, etc.): 2  Moving from lying on back to sitting on the side of the bed?: 3  Moving to and from a bed to a chair (including a wheelchair)?: 2  Need to walk in hospital room?: 2  Climbing 3-5 steps with a railing?: 1  Basic Mobility Total Score: 13       Therapeutic Activities and Exercises:   Worked on body mechanics on bed mobility and transfers. Implemented Static Stand trng using Std Walker x 30 seconds(twice) with maxA and cues; Bilat LE strengthening ROM ex at supine and sitting position x 10 reps on each.    Patient left supine with all lines intact, call button in reach, bed alarm on and nursing notified..    GOALS:   Multidisciplinary Problems     Physical Therapy Goals        Problem: Physical Therapy Goal    Goal Priority Disciplines Outcome Goal Variances Interventions   Physical Therapy Goal     PT, PT/OT Ongoing, Progressing     Description: Goals to be met by: 11/10/20    Patient will increase functional independence with mobility by performin. Rolling to sides using bed rail  with contact guard assistance and cues  2. Supine to sit with minimal assistance and cues  3. Sit to supine with moderate assistance and cues  4. Tolerate Static Sit at side of the bed for 10 minutes with Standby Assistance  5. Bed to chair transfer with moderate assistance and cues using Slide Board TECHNIQUE  6. Lower extremity exercise program x15 reps per handout, with assistance as needed                      Time Tracking:     PT Received On: 10/30/20  PT Start Time: 1348     PT Stop Time: 1418  PT Total Time (min): 30 min     Billable Minutes: Therapeutic Activity 20 and Therapeutic Exercise 10    Treatment Type: Treatment  PT/PTA: PT     PTA Visit Number: 0     Edgar Lamas, PT  10/30/2020

## 2020-10-31 LAB
ANION GAP SERPL CALC-SCNC: 11 MMOL/L (ref 8–16)
BUN SERPL-MCNC: 12 MG/DL (ref 8–23)
CALCIUM SERPL-MCNC: 9.8 MG/DL (ref 8.7–10.5)
CHLORIDE SERPL-SCNC: 97 MMOL/L (ref 95–110)
CO2 SERPL-SCNC: 30 MMOL/L (ref 23–29)
CREAT SERPL-MCNC: 1 MG/DL (ref 0.5–1.4)
EST. GFR  (AFRICAN AMERICAN): >60 ML/MIN/1.73 M^2
EST. GFR  (NON AFRICAN AMERICAN): 56 ML/MIN/1.73 M^2
GLUCOSE SERPL-MCNC: 132 MG/DL (ref 70–110)
POTASSIUM SERPL-SCNC: 4 MMOL/L (ref 3.5–5.1)
SODIUM SERPL-SCNC: 138 MMOL/L (ref 136–145)
VANCOMYCIN TROUGH SERPL-MCNC: 15.8 UG/ML (ref 10–22)

## 2020-10-31 PROCEDURE — 80202 ASSAY OF VANCOMYCIN: CPT

## 2020-10-31 PROCEDURE — 25000003 PHARM REV CODE 250: Performed by: NURSE PRACTITIONER

## 2020-10-31 PROCEDURE — 11000004 HC SNF PRIVATE

## 2020-10-31 PROCEDURE — A4216 STERILE WATER/SALINE, 10 ML: HCPCS | Performed by: INTERNAL MEDICINE

## 2020-10-31 PROCEDURE — 27000221 HC OXYGEN, UP TO 24 HOURS

## 2020-10-31 PROCEDURE — 25000242 PHARM REV CODE 250 ALT 637 W/ HCPCS: Performed by: NURSE PRACTITIONER

## 2020-10-31 PROCEDURE — 94799 UNLISTED PULMONARY SVC/PX: CPT

## 2020-10-31 PROCEDURE — 97530 THERAPEUTIC ACTIVITIES: CPT

## 2020-10-31 PROCEDURE — 25000003 PHARM REV CODE 250: Performed by: INTERNAL MEDICINE

## 2020-10-31 PROCEDURE — 80048 BASIC METABOLIC PNL TOTAL CA: CPT

## 2020-10-31 PROCEDURE — 36415 COLL VENOUS BLD VENIPUNCTURE: CPT

## 2020-10-31 PROCEDURE — 94761 N-INVAS EAR/PLS OXIMETRY MLT: CPT

## 2020-10-31 PROCEDURE — 94640 AIRWAY INHALATION TREATMENT: CPT

## 2020-10-31 RX ADMIN — ACETAMINOPHEN 500 MG: 500 TABLET, FILM COATED ORAL at 08:10

## 2020-10-31 RX ADMIN — METHOCARBAMOL TABLETS 500 MG: 500 TABLET, COATED ORAL at 08:10

## 2020-10-31 RX ADMIN — DONEPEZIL HYDROCHLORIDE 10 MG: 5 TABLET, FILM COATED ORAL at 08:10

## 2020-10-31 RX ADMIN — Medication 10 ML: at 05:10

## 2020-10-31 RX ADMIN — FERROUS SULFATE TAB 325 MG (65 MG ELEMENTAL FE) 325 MG: 325 (65 FE) TAB at 08:10

## 2020-10-31 RX ADMIN — MEMANTINE 10 MG: 10 TABLET ORAL at 08:10

## 2020-10-31 RX ADMIN — HYDROCODONE BITARTRATE AND ACETAMINOPHEN 1 TABLET: 10; 325 TABLET ORAL at 07:10

## 2020-10-31 RX ADMIN — IPRATROPIUM BROMIDE AND ALBUTEROL SULFATE 3 ML: .5; 3 SOLUTION RESPIRATORY (INHALATION) at 07:10

## 2020-10-31 RX ADMIN — ASPIRIN 81 MG: 81 TABLET, COATED ORAL at 08:10

## 2020-10-31 RX ADMIN — Medication 10 ML: at 12:10

## 2020-10-31 RX ADMIN — GUAIFENESIN 600 MG: 600 TABLET, EXTENDED RELEASE ORAL at 08:10

## 2020-10-31 RX ADMIN — HYDROCODONE BITARTRATE AND ACETAMINOPHEN 1 TABLET: 10; 325 TABLET ORAL at 08:10

## 2020-10-31 RX ADMIN — PRAVASTATIN SODIUM 40 MG: 40 TABLET ORAL at 08:10

## 2020-10-31 RX ADMIN — METHOCARBAMOL TABLETS 500 MG: 500 TABLET, COATED ORAL at 05:10

## 2020-10-31 RX ADMIN — Medication 10 ML: at 07:10

## 2020-10-31 RX ADMIN — LISINOPRIL 10 MG: 10 TABLET ORAL at 08:10

## 2020-10-31 RX ADMIN — ISOSORBIDE MONONITRATE 60 MG: 60 TABLET, EXTENDED RELEASE ORAL at 08:10

## 2020-10-31 RX ADMIN — IPRATROPIUM BROMIDE AND ALBUTEROL SULFATE 3 ML: .5; 3 SOLUTION RESPIRATORY (INHALATION) at 01:10

## 2020-10-31 RX ADMIN — ACETAMINOPHEN 500 MG: 500 TABLET, FILM COATED ORAL at 03:10

## 2020-10-31 RX ADMIN — MICONAZOLE NITRATE: 20 OINTMENT TOPICAL at 09:10

## 2020-10-31 RX ADMIN — MELATONIN TAB 3 MG 6 MG: 3 TAB at 08:10

## 2020-10-31 RX ADMIN — METOPROLOL SUCCINATE 50 MG: 50 TABLET, EXTENDED RELEASE ORAL at 08:10

## 2020-10-31 RX ADMIN — DULOXETINE 60 MG: 30 CAPSULE, DELAYED RELEASE ORAL at 08:10

## 2020-10-31 RX ADMIN — METHOCARBAMOL TABLETS 500 MG: 500 TABLET, COATED ORAL at 12:10

## 2020-10-31 RX ADMIN — CLOPIDOGREL 75 MG: 75 TABLET, FILM COATED ORAL at 08:10

## 2020-10-31 RX ADMIN — MICONAZOLE NITRATE: 20 OINTMENT TOPICAL at 08:10

## 2020-10-31 RX ADMIN — PANTOPRAZOLE SODIUM 40 MG: 40 TABLET, DELAYED RELEASE ORAL at 05:10

## 2020-10-31 RX ADMIN — FUROSEMIDE 40 MG: 40 TABLET ORAL at 08:10

## 2020-10-31 NOTE — PT/OT/SLP PROGRESS
"Physical Therapy Treatment    Patient Name:  Martina Betancourt   MRN:  [id number]    Recommendations:     Discharge Recommendations:  nursing facility, basic   Discharge Equipment Recommendations: lift device   Barriers to discharge: Decreased caregiver support    Assessment:     Martina Betancourt is a 70 y.o. female admitted with a medical diagnosis of Debility.  She presents with the following impairments/functional limitations:  weakness, impaired balance, decreased safety awareness, pain, impaired endurance, impaired cognition, impaired self care skills, decreased coordination, impaired functional mobilty, decreased lower extremity function, decreased upper extremity function, gait instability. Patient showed gradual increased static stand tolerance using Std Walker at bedside and increased tolerance with slide board transfers with max A x 1-2  and cues. Tolerated sitting activity in the wheelchair for ~1 hour today.     Rehab Prognosis: Fair; patient would benefit from acute skilled PT services to address these deficits and reach maximum level of function.    Recent Surgery: * No surgery found *      Plan:     During this hospitalization, patient to be seen daily to address the identified rehab impairments via gait training, therapeutic activities, therapeutic exercises and progress toward the following goals:    · Plan of Care Expires:  11/10/20    Subjective     Chief Complaint: lower back pain upon transitional movement and bilat knees pain upon weight bearing activity.  Patient/Family Comments/goals: "To try stand up better in Therapy".  Pain/Comfort:  · Pain Rating 1: 10/10  · Location - Side 1: Bilateral  · Location - Orientation 1: lower  · Location 1: back  · Pain Addressed 1: Reposition, Cessation of Activity  · Pain Rating Post-Intervention 1: 8/10  · Pain Rating 2: 8/10  · Location - Side 2: Bilateral  · Location - Orientation 2: lower  · Location 2: knee  · Pain Addressed 2: Cessation of Activity  · Pain " Rating Post-Intervention 2: 7/10      Objective:     Communicated with patient  prior to session.  Patient found supine with oxygen, peripheral IV, PureWick upon PT entry to room.     General Precautions: Standard, fall   Orthopedic Precautions:Full weight bearing   Braces: N/A     Functional Mobility:  · Bed Mobility:     · Rolling Left:  minimal/contact guard assistance and cues using bed rail  · Rolling Right: minimal/contact guard assistance and cues using bed rail  · Supine to Sit: minimal/moderate assistance and cues  · Sit to Supine: maximum/moderate assistance and cues  · Transfers:     · Sit to Stand:  maximal assistance and of 1-2 persons with standard walker  · Bed to Chair: maximum assistance and cues x 1-2 with  slide board  using  slide/scoot tech  · Balance: Sitting at side of the bed Static: Fair; Dynamic :Fair-' Standing with std walker Static: Poor+ for ~1.5 minutes tolerance  · Tolerated sitting up in the wheelchair for ~1 hour today for self care activities      AM-PAC 6 CLICK MOBILITY  Turning over in bed (including adjusting bedclothes, sheets and blankets)?: 3  Sitting down on and standing up from a chair with arms (e.g., wheelchair, bedside commode, etc.): 2  Moving from lying on back to sitting on the side of the bed?: 3  Moving to and from a bed to a chair (including a wheelchair)?: 2  Need to walk in hospital room?: 2  Climbing 3-5 steps with a railing?: 1  Basic Mobility Total Score: 13       Therapeutic Activities and Exercises:   Worked on body mechanics on bed mobility and transfers sit to stand with std walker and bed <>wheelchair with sliding board with maxA x 1-2 and cues; Sitting activity at side of the bed and in the wheelchair for self care activities.    Patient left up in the wheelcahior for ~ 1 hour and assisted back to bed with sliding board transfers with max A x 1-2 and cues with all lines intact, call button in reach and nursing notified..    GOALS:   Multidisciplinary  Problems     Physical Therapy Goals        Problem: Physical Therapy Goal    Goal Priority Disciplines Outcome Goal Variances Interventions   Physical Therapy Goal     PT, PT/OT Ongoing, Progressing     Description: Goals to be met by: 11/10/20    Patient will increase functional independence with mobility by performin. Rolling to sides using bed rail with contact guard assistance and cues  2. Supine to sit with minimal assistance and cues  3. Sit to supine with moderate assistance and cues  4. Tolerate Static Sit at side of the bed for 10 minutes with Standby Assistance  5. Bed to chair transfer with moderate assistance and cues using Slide Board TECHNIQUE  6. Lower extremity exercise program x15 reps per handout, with assistance as needed                      Time Tracking:     PT Received On: 10/31/20  PT Start Time: 0915     PT Stop Time: 50  PT Total Time (min): 35 min     Billable Minutes: Therapeutic Activity 35    Treatment Type: Treatment  PT/PTA: PT     PTA Visit Number: 0     Edgar Lamas, PT  10/31/2020

## 2020-10-31 NOTE — PLAN OF CARE
Patient admitted as SWING, Assessment complete per flow sheet, AAOX4, RR even unlabored BBS diminished, on 3 L NC. Abdomen soft, non distended, with active bowel sounds x 4 quads.Tolerating diet well. Dressing to back C/D/I. PICC to RT arm SL, Dsg C/D/I.  Medications administered per MAR, tolerated well. HERMELINDA heels elevated off bed with pillows, daily betadine applied to pressure ulcer. safety precautions maintained, bed alarm on, free from falls/ injury, call bell in reach, instructed to call for needs, voiced understanding, agrees with plan of care.

## 2020-10-31 NOTE — PLAN OF CARE
Problem: Physical Therapy Goal  Goal: Physical Therapy Goal  Description: Goals to be met by: 11/10/20    Patient will increase functional independence with mobility by performin. Rolling to sides using bed rail with contact guard assistance and cues  2. Supine to sit with minimal assistance and cues  3. Sit to supine with moderate assistance and cues  4. Tolerate Static Sit at side of the bed for 10 minutes with Standby Assistance - met 10/29/20   Updated 10/30/20:         Tolerate Static Stand using Std Walker  for ~ 1 - 2 minutes with max/moderate assistance and cues  5. Bed to chair transfer with moderate assistance and cues using Slide Board TECHNIQUE  6. Lower extremity exercise program x15 reps per handout, with assistance as needed     Outcome: Ongoing, ProgressingProblem:

## 2020-11-01 PROCEDURE — 97530 THERAPEUTIC ACTIVITIES: CPT

## 2020-11-01 PROCEDURE — 25000003 PHARM REV CODE 250: Performed by: NURSE PRACTITIONER

## 2020-11-01 PROCEDURE — 94640 AIRWAY INHALATION TREATMENT: CPT

## 2020-11-01 PROCEDURE — 25000003 PHARM REV CODE 250: Performed by: INTERNAL MEDICINE

## 2020-11-01 PROCEDURE — 94799 UNLISTED PULMONARY SVC/PX: CPT

## 2020-11-01 PROCEDURE — 25000003 PHARM REV CODE 250: Performed by: FAMILY MEDICINE

## 2020-11-01 PROCEDURE — 63600175 PHARM REV CODE 636 W HCPCS: Performed by: FAMILY MEDICINE

## 2020-11-01 PROCEDURE — A4216 STERILE WATER/SALINE, 10 ML: HCPCS | Performed by: INTERNAL MEDICINE

## 2020-11-01 PROCEDURE — 11000004 HC SNF PRIVATE

## 2020-11-01 PROCEDURE — 27000221 HC OXYGEN, UP TO 24 HOURS

## 2020-11-01 PROCEDURE — 25000242 PHARM REV CODE 250 ALT 637 W/ HCPCS: Performed by: NURSE PRACTITIONER

## 2020-11-01 PROCEDURE — 94761 N-INVAS EAR/PLS OXIMETRY MLT: CPT

## 2020-11-01 RX ADMIN — MEMANTINE 10 MG: 10 TABLET ORAL at 09:11

## 2020-11-01 RX ADMIN — MICONAZOLE NITRATE: 20 OINTMENT TOPICAL at 08:11

## 2020-11-01 RX ADMIN — DULOXETINE 60 MG: 30 CAPSULE, DELAYED RELEASE ORAL at 08:11

## 2020-11-01 RX ADMIN — METHOCARBAMOL TABLETS 500 MG: 500 TABLET, COATED ORAL at 09:11

## 2020-11-01 RX ADMIN — METHOCARBAMOL TABLETS 500 MG: 500 TABLET, COATED ORAL at 05:11

## 2020-11-01 RX ADMIN — MEMANTINE 10 MG: 10 TABLET ORAL at 08:11

## 2020-11-01 RX ADMIN — METHOCARBAMOL TABLETS 500 MG: 500 TABLET, COATED ORAL at 12:11

## 2020-11-01 RX ADMIN — GUAIFENESIN 600 MG: 600 TABLET, EXTENDED RELEASE ORAL at 08:11

## 2020-11-01 RX ADMIN — IPRATROPIUM BROMIDE AND ALBUTEROL SULFATE 3 ML: .5; 3 SOLUTION RESPIRATORY (INHALATION) at 01:11

## 2020-11-01 RX ADMIN — Medication 10 ML: at 05:11

## 2020-11-01 RX ADMIN — VANCOMYCIN HYDROCHLORIDE 1000 MG: 1 INJECTION, POWDER, LYOPHILIZED, FOR SOLUTION INTRAVENOUS at 12:11

## 2020-11-01 RX ADMIN — FERROUS SULFATE TAB 325 MG (65 MG ELEMENTAL FE) 325 MG: 325 (65 FE) TAB at 09:11

## 2020-11-01 RX ADMIN — IPRATROPIUM BROMIDE AND ALBUTEROL SULFATE 3 ML: .5; 3 SOLUTION RESPIRATORY (INHALATION) at 07:11

## 2020-11-01 RX ADMIN — GUAIFENESIN 600 MG: 600 TABLET, EXTENDED RELEASE ORAL at 09:11

## 2020-11-01 RX ADMIN — LISINOPRIL 10 MG: 10 TABLET ORAL at 08:11

## 2020-11-01 RX ADMIN — PANTOPRAZOLE SODIUM 40 MG: 40 TABLET, DELAYED RELEASE ORAL at 05:11

## 2020-11-01 RX ADMIN — ACETAMINOPHEN 500 MG: 500 TABLET, FILM COATED ORAL at 08:11

## 2020-11-01 RX ADMIN — METOPROLOL SUCCINATE 50 MG: 50 TABLET, EXTENDED RELEASE ORAL at 08:11

## 2020-11-01 RX ADMIN — ACETAMINOPHEN 500 MG: 500 TABLET, FILM COATED ORAL at 02:11

## 2020-11-01 RX ADMIN — VANCOMYCIN HYDROCHLORIDE 1000 MG: 1 INJECTION, POWDER, LYOPHILIZED, FOR SOLUTION INTRAVENOUS at 11:11

## 2020-11-01 RX ADMIN — Medication 10 ML: at 12:11

## 2020-11-01 RX ADMIN — CLOPIDOGREL 75 MG: 75 TABLET, FILM COATED ORAL at 08:11

## 2020-11-01 RX ADMIN — ACETAMINOPHEN 500 MG: 500 TABLET, FILM COATED ORAL at 09:11

## 2020-11-01 RX ADMIN — METHOCARBAMOL TABLETS 500 MG: 500 TABLET, COATED ORAL at 08:11

## 2020-11-01 RX ADMIN — HYDROCODONE BITARTRATE AND ACETAMINOPHEN 1 TABLET: 10; 325 TABLET ORAL at 09:11

## 2020-11-01 RX ADMIN — PRAVASTATIN SODIUM 40 MG: 40 TABLET ORAL at 09:11

## 2020-11-01 RX ADMIN — ASPIRIN 81 MG: 81 TABLET, COATED ORAL at 08:11

## 2020-11-01 RX ADMIN — ISOSORBIDE MONONITRATE 60 MG: 60 TABLET, EXTENDED RELEASE ORAL at 08:11

## 2020-11-01 RX ADMIN — FUROSEMIDE 40 MG: 40 TABLET ORAL at 08:11

## 2020-11-01 RX ADMIN — MICONAZOLE NITRATE: 20 OINTMENT TOPICAL at 09:11

## 2020-11-01 RX ADMIN — Medication 10 ML: at 11:11

## 2020-11-01 RX ADMIN — DONEPEZIL HYDROCHLORIDE 10 MG: 5 TABLET, FILM COATED ORAL at 09:11

## 2020-11-01 NOTE — PLAN OF CARE
Patient resting with some minor complaints of generalized pain with some relief stated with PRN pain medicines. Patient compliant with O2 and using call light. VS stable. A/Ox4. No acute changes noted. Plan of care reviewed and agreed upon.

## 2020-11-01 NOTE — PT/OT/SLP PROGRESS
"Physical Therapy Treatment    Patient Name:  Martina Betancourt   MRN:  [id number]    Recommendations:     Discharge Recommendations:  nursing facility, basic   Discharge Equipment Recommendations: lift device   Barriers to discharge: Decreased caregiver support    Assessment:     Martina Betancourt is a 70 y.o. female admitted with a medical diagnosis of Debility.  She presents with the following impairments/functional limitations:  weakness, impaired balance, impaired self care skills, impaired functional mobilty, impaired endurance, gait instability, decreased safety awareness, impaired cognition, pain, decreased upper extremity function, decreased lower extremity function. Patient has increased performance with cues on bed mobility with lesser physical assistance needed. Tolerated sitting up in the wheelchair for 1.5 hours due to lower back pain. Requires sliding board for transfer activty.    Rehab Prognosis: Fair; patient would benefit from acute skilled PT services to address these deficits and reach maximum level of function.    Recent Surgery: * No surgery found *      Plan:     During this hospitalization, patient to be seen daily to address the identified rehab impairments via gait training, therapeutic activities, therapeutic exercises and progress toward the following goals:    · Plan of Care Expires:  11/10/20    Subjective     Chief Complaint: lower back pain upon transitional movement and sitting activity.  Patient/Family Comments/goals: "To try to get better".  Pain/Comfort:  · Pain Rating 1: 10/10  · Location - Side 1: Bilateral  · Location - Orientation 1: lower  · Location 1: back  · Pain Addressed 1: Reposition, Cessation of Activity  · Pain Rating Post-Intervention 1: 8/10      Objective:     Communicated with patient prior to session.  Patient found HOB elevated with oxygen, peripheral IV, PureWick upon PT entry to room.     General Precautions: Standard, fall   Orthopedic Precautions:Full weight " bearing   Braces: N/A     Functional Mobility:  · Bed Mobility:     · Rolling Left:  minimal/contact guard assistance and cues  · Rolling Right: minimal/contact guard assistance and cues  · Supine to Sit: minimal assistance and cues  · Sit to Supine: maximum/moderate assistance and cues  · Transfers:     · Bed to Chair: wheelchair maximum assistance x 2 and cues  with  slide board  using  slide/scoot tech      AM-PAC 6 CLICK MOBILITY  Turning over in bed (including adjusting bedclothes, sheets and blankets)?: 3  Sitting down on and standing up from a chair with arms (e.g., wheelchair, bedside commode, etc.): 2  Moving from lying on back to sitting on the side of the bed?: 3  Moving to and from a bed to a chair (including a wheelchair)?: 2  Need to walk in hospital room?: 2  Climbing 3-5 steps with a railing?: 1  Basic Mobility Total Score: 13       Therapeutic Activities and Exercises:   Worked on body mechanics on bed mobility and transfers, Sitting activity and tolerance in the wheelchair.    Patient left sitting up in the wheelcahir for 1.5 hours and assisted back to bed with sliding board with all lines intact, call button in reach, bed alarm on and nursing notified..    GOALS:   Multidisciplinary Problems     Physical Therapy Goals        Problem: Physical Therapy Goal    Goal Priority Disciplines Outcome Goal Variances Interventions   Physical Therapy Goal     PT, PT/OT Ongoing, Progressing     Description: Goals to be met by: 11/10/20    Patient will increase functional independence with mobility by performin. Rolling to sides using bed rail with contact guard assistance and cues  2. Supine to sit with minimal assistance and cues  3. Sit to supine with moderate assistance and cues  4. Tolerate Static Sit at side of the bed for 10 minutes with Standby Assistance  5. Bed to chair transfer with moderate assistance and cues using Slide Board TECHNIQUE  6. Lower extremity exercise program x15 reps per  handout, with assistance as needed                      Time Tracking:     PT Received On: 11/01/20  PT Start Time: 0942     PT Stop Time: 0958  PT Total Time (min): 16 min     Billable Minutes: Therapeutic Activity 16    Treatment Type: Treatment  PT/PTA: PT     PTA Visit Number: 0     Edgar Lamas, PT  11/01/2020

## 2020-11-01 NOTE — PROGRESS NOTES
Pharmacokinetic Assessment Follow Up: IV Vancomycin    Vancomycin serum concentration assessment(s):    The trough level was drawn correctly and can be used to guide therapy at this time. The measurement is within the desired definitive target range of 15 to 20 mcg/mL.    Vancomycin Regimen Plan:    Continue regimen to Vancomycin 1000 mg IV every 24 hours with next serum trough concentration measured at 11/2/20 prior to 3 dose on 2330    Drug levels (last 3 results):  Recent Labs   Lab Result Units 10/29/20  2202 10/31/20  2224   Vancomycin, Random ug/mL 12.5  --    Vancomycin-Trough ug/mL  --  15.8       Pharmacy will continue to follow and monitor vancomycin.    Please contact pharmacy at extension [phone] for questions regarding this assessment.    Thank you for the consult,   Jailene Pace       Patient brief summary:  Martina Betancourt is a 70 y.o. female initiated on antimicrobial therapy with IV Vancomycin for treatment of bone/joint infection    The patient's current regimen is 1000mg q24    Drug Allergies:   Review of patient's allergies indicates:   Allergen Reactions    Hydroxyzine hcl     Tizanidine        Actual Body Weight:   134kg    Renal Function:   Estimated Creatinine Clearance: 76.3 mL/min (based on SCr of 1 mg/dL).,     Dialysis Method (if applicable):  N/A    CBC (last 72 hours):  Recent Labs   Lab Result Units 10/29/20  0412   WBC K/uL 8.44   Hemoglobin g/dL 9.9*   Hematocrit % 34.2*   Platelets K/uL 324   Gran % % 65.2   Lymph % % 17.1*   Mono % % 14.3   Eosinophil % % 2.7   Basophil % % 0.2   Differential Method  Automated       Metabolic Panel (last 72 hours):  Recent Labs   Lab Result Units 10/29/20  0412 10/31/20  2224   Sodium mmol/L 141 138   Potassium mmol/L 3.2* 4.0   Chloride mmol/L 98 97   CO2 mmol/L 31* 30*   Glucose mg/dL 82 132*   BUN mg/dL 10 12   Creatinine mg/dL 0.8 1.0   Magnesium mg/dL 1.9  --    Phosphorus mg/dL 3.8  --        Vancomycin Administrations:  vancomycin given in  the last 96 hours                     vancomycin in dextrose 5 % 1 gram/250 mL IVPB 1,000 mg (mg) 1,000 mg New Bag 11/01/20 0030     1,000 mg New Bag 10/30/20 2345     1,000 mg New Bag 10/29/20 2317                    Microbiologic Results:  Microbiology Results (last 7 days)       Procedure Component Value Units Date/Time    Stool culture [[id number]] Collected: 10/26/20 1827    Order Status: Completed Specimen: Stool Updated: 10/30/20 1442     Stool Culture No Salmonella,Shigella,Vibrio,Campylobacter,Yersinia isolated.    E. coli 0157 antigen [[id number]] Collected: 10/26/20 1827    Order Status: Completed Specimen: Stool Updated: 10/28/20 1213     Shiga Toxin 1 E.coli Negative     Shiga Toxin 2 E.coli Negative    Clostridium difficile EIA [[id number]] Collected: 10/26/20 1827    Order Status: Canceled Specimen: Stool

## 2020-11-01 NOTE — PLAN OF CARE
Problem: Physical Therapy Goal  Goal: Physical Therapy Goal  Description: Goals to be met by: 11/10/20    Patient will increase functional independence with mobility by performin. Rolling to sides using bed rail with contact guard assistance and cues  2. Supine to sit with minimal assistance and cues - met 20  3. Sit to supine with moderate assistance and cues  4. Tolerate Static Sit at side of the bed for 10 minutes with Standby Assistance - met 10/29/20   Updated 10/30/20:         Tolerate Static Stand using Std Walker  for ~ 1 - 2 minutes with max/moderate assistance and cues  5. Bed to chair transfer with moderate assistance and cues using Slide Board TECHNIQUE  6. Lower extremity exercise program x15 reps per handout, with assistance as needed     Outcome: Ongoing, Progressing

## 2020-11-02 LAB
ANION GAP SERPL CALC-SCNC: 10 MMOL/L (ref 8–16)
BASOPHILS # BLD AUTO: 0.03 K/UL (ref 0–0.2)
BASOPHILS NFR BLD: 0.3 % (ref 0–1.9)
BUN SERPL-MCNC: 10 MG/DL (ref 8–23)
CALCIUM SERPL-MCNC: 10 MG/DL (ref 8.7–10.5)
CHLORIDE SERPL-SCNC: 100 MMOL/L (ref 95–110)
CO2 SERPL-SCNC: 30 MMOL/L (ref 23–29)
CREAT SERPL-MCNC: 0.9 MG/DL (ref 0.5–1.4)
DIFFERENTIAL METHOD: ABNORMAL
EOSINOPHIL # BLD AUTO: 0.3 K/UL (ref 0–0.5)
EOSINOPHIL NFR BLD: 2.9 % (ref 0–8)
ERYTHROCYTE [DISTWIDTH] IN BLOOD BY AUTOMATED COUNT: 16.2 % (ref 11.5–14.5)
EST. GFR  (AFRICAN AMERICAN): >60 ML/MIN/1.73 M^2
EST. GFR  (NON AFRICAN AMERICAN): >60 ML/MIN/1.73 M^2
GLUCOSE SERPL-MCNC: 91 MG/DL (ref 70–110)
HCT VFR BLD AUTO: 32.7 % (ref 37–48.5)
HGB BLD-MCNC: 9.6 G/DL (ref 12–16)
IMM GRANULOCYTES # BLD AUTO: 0.04 K/UL (ref 0–0.04)
IMM GRANULOCYTES NFR BLD AUTO: 0.5 % (ref 0–0.5)
LYMPHOCYTES # BLD AUTO: 1.9 K/UL (ref 1–4.8)
LYMPHOCYTES NFR BLD: 21.2 % (ref 18–48)
MAGNESIUM SERPL-MCNC: 1.8 MG/DL (ref 1.6–2.6)
MCH RBC QN AUTO: 26.6 PG (ref 27–31)
MCHC RBC AUTO-ENTMCNC: 29.4 G/DL (ref 32–36)
MCV RBC AUTO: 91 FL (ref 82–98)
MONOCYTES # BLD AUTO: 0.9 K/UL (ref 0.3–1)
MONOCYTES NFR BLD: 10.3 % (ref 4–15)
NEUTROPHILS # BLD AUTO: 5.8 K/UL (ref 1.8–7.7)
NEUTROPHILS NFR BLD: 64.8 % (ref 38–73)
NRBC BLD-RTO: 0 /100 WBC
PHOSPHATE SERPL-MCNC: 3.8 MG/DL (ref 2.7–4.5)
PLATELET # BLD AUTO: 371 K/UL (ref 150–350)
PMV BLD AUTO: 9.1 FL (ref 9.2–12.9)
POTASSIUM SERPL-SCNC: 3.7 MMOL/L (ref 3.5–5.1)
RBC # BLD AUTO: 3.61 M/UL (ref 4–5.4)
SODIUM SERPL-SCNC: 140 MMOL/L (ref 136–145)
VANCOMYCIN TROUGH SERPL-MCNC: 16.6 UG/ML (ref 10–22)
WBC # BLD AUTO: 8.87 K/UL (ref 3.9–12.7)

## 2020-11-02 PROCEDURE — 99307 SBSQ NF CARE SF MDM 10: CPT | Mod: ,,, | Performed by: FAMILY MEDICINE

## 2020-11-02 PROCEDURE — 94761 N-INVAS EAR/PLS OXIMETRY MLT: CPT

## 2020-11-02 PROCEDURE — 97535 SELF CARE MNGMENT TRAINING: CPT

## 2020-11-02 PROCEDURE — 11000004 HC SNF PRIVATE

## 2020-11-02 PROCEDURE — A4216 STERILE WATER/SALINE, 10 ML: HCPCS | Performed by: INTERNAL MEDICINE

## 2020-11-02 PROCEDURE — 97530 THERAPEUTIC ACTIVITIES: CPT

## 2020-11-02 PROCEDURE — 25000003 PHARM REV CODE 250: Performed by: INTERNAL MEDICINE

## 2020-11-02 PROCEDURE — 25000242 PHARM REV CODE 250 ALT 637 W/ HCPCS: Performed by: NURSE PRACTITIONER

## 2020-11-02 PROCEDURE — 84100 ASSAY OF PHOSPHORUS: CPT

## 2020-11-02 PROCEDURE — 85025 COMPLETE CBC W/AUTO DIFF WBC: CPT

## 2020-11-02 PROCEDURE — 25000003 PHARM REV CODE 250: Performed by: NURSE PRACTITIONER

## 2020-11-02 PROCEDURE — 97110 THERAPEUTIC EXERCISES: CPT

## 2020-11-02 PROCEDURE — 83735 ASSAY OF MAGNESIUM: CPT

## 2020-11-02 PROCEDURE — 94799 UNLISTED PULMONARY SVC/PX: CPT

## 2020-11-02 PROCEDURE — 80202 ASSAY OF VANCOMYCIN: CPT

## 2020-11-02 PROCEDURE — 99307 PR NURSING FAC CARE, SUBSEQ, IMPROVING: ICD-10-PCS | Mod: ,,, | Performed by: FAMILY MEDICINE

## 2020-11-02 PROCEDURE — 36415 COLL VENOUS BLD VENIPUNCTURE: CPT

## 2020-11-02 PROCEDURE — 80048 BASIC METABOLIC PNL TOTAL CA: CPT

## 2020-11-02 PROCEDURE — 94640 AIRWAY INHALATION TREATMENT: CPT

## 2020-11-02 PROCEDURE — 27000221 HC OXYGEN, UP TO 24 HOURS

## 2020-11-02 RX ADMIN — Medication 10 ML: at 05:11

## 2020-11-02 RX ADMIN — DONEPEZIL HYDROCHLORIDE 10 MG: 5 TABLET, FILM COATED ORAL at 08:11

## 2020-11-02 RX ADMIN — GUAIFENESIN 600 MG: 600 TABLET, EXTENDED RELEASE ORAL at 08:11

## 2020-11-02 RX ADMIN — PRAVASTATIN SODIUM 40 MG: 40 TABLET ORAL at 08:11

## 2020-11-02 RX ADMIN — CALCIUM CARBONATE (ANTACID) CHEW TAB 500 MG 500 MG: 500 CHEW TAB at 08:11

## 2020-11-02 RX ADMIN — MEMANTINE 10 MG: 10 TABLET ORAL at 08:11

## 2020-11-02 RX ADMIN — IPRATROPIUM BROMIDE AND ALBUTEROL SULFATE 3 ML: .5; 3 SOLUTION RESPIRATORY (INHALATION) at 07:11

## 2020-11-02 RX ADMIN — ACETAMINOPHEN 500 MG: 500 TABLET, FILM COATED ORAL at 02:11

## 2020-11-02 RX ADMIN — CLOPIDOGREL 75 MG: 75 TABLET, FILM COATED ORAL at 08:11

## 2020-11-02 RX ADMIN — METHOCARBAMOL TABLETS 500 MG: 500 TABLET, COATED ORAL at 08:11

## 2020-11-02 RX ADMIN — METOPROLOL SUCCINATE 50 MG: 50 TABLET, EXTENDED RELEASE ORAL at 08:11

## 2020-11-02 RX ADMIN — ACETAMINOPHEN 500 MG: 500 TABLET, FILM COATED ORAL at 08:11

## 2020-11-02 RX ADMIN — METHOCARBAMOL TABLETS 500 MG: 500 TABLET, COATED ORAL at 05:11

## 2020-11-02 RX ADMIN — FERROUS SULFATE TAB 325 MG (65 MG ELEMENTAL FE) 325 MG: 325 (65 FE) TAB at 08:11

## 2020-11-02 RX ADMIN — HYDROCODONE BITARTRATE AND ACETAMINOPHEN 1 TABLET: 10; 325 TABLET ORAL at 07:11

## 2020-11-02 RX ADMIN — Medication 10 ML: at 11:11

## 2020-11-02 RX ADMIN — LISINOPRIL 10 MG: 10 TABLET ORAL at 08:11

## 2020-11-02 RX ADMIN — DULOXETINE 60 MG: 30 CAPSULE, DELAYED RELEASE ORAL at 08:11

## 2020-11-02 RX ADMIN — MICONAZOLE NITRATE: 20 OINTMENT TOPICAL at 08:11

## 2020-11-02 RX ADMIN — FUROSEMIDE 40 MG: 40 TABLET ORAL at 08:11

## 2020-11-02 RX ADMIN — METHOCARBAMOL TABLETS 500 MG: 500 TABLET, COATED ORAL at 12:11

## 2020-11-02 RX ADMIN — PANTOPRAZOLE SODIUM 40 MG: 40 TABLET, DELAYED RELEASE ORAL at 05:11

## 2020-11-02 RX ADMIN — IPRATROPIUM BROMIDE AND ALBUTEROL SULFATE 3 ML: .5; 3 SOLUTION RESPIRATORY (INHALATION) at 01:11

## 2020-11-02 RX ADMIN — HYDROCODONE BITARTRATE AND ACETAMINOPHEN 1 TABLET: 10; 325 TABLET ORAL at 08:11

## 2020-11-02 RX ADMIN — HYDROCODONE BITARTRATE AND ACETAMINOPHEN 1 TABLET: 10; 325 TABLET ORAL at 02:11

## 2020-11-02 RX ADMIN — ISOSORBIDE MONONITRATE 60 MG: 60 TABLET, EXTENDED RELEASE ORAL at 08:11

## 2020-11-02 RX ADMIN — ASPIRIN 81 MG: 81 TABLET, COATED ORAL at 08:11

## 2020-11-02 NOTE — PLAN OF CARE
Patient admitted on SWING status for long-term antibiotic therapy related to spinal abscesses.  Patient on IV Vancomycin, daily.  Planned Vancomycin trough tonight at 2200.  Patient up with PT today, sat at edge of bed and stood up with assistance.  Patient has right bracial PICC line for long-term antibiotics.  Purple lumen uable to flush, red lumen patent with blood return.  Dressing change completed today, plans to change again on 11/9/20.  Norco 10mg given as needed for pain.  Sacrum/buttock red, turning every 2 hours for prevention of further breakdown.  Old pressure ulcer to right heel, heel lift boots used to prevent further breakdown.  Purewick in place.  VSS, afebrile, 3 liters nasal cannula.

## 2020-11-02 NOTE — PROGRESS NOTES
[facility] Medicine  Progress Note    Patient Name: Martina Betancourt  MRN: [id number]  Patient Class: IP- Swing   Admission Date: 10/20/2020  Length of Stay: 13 days  Attending Physician: Vamsi Manuel MD  Primary Care Provider: Joe Fuller Iii, MD        Subjective:     Principal Problem:Debility        HPI:  71yo female patient with hx of alzheiners, CAD, HLD, HTN, myopathy, MI, obesity, sleep apnea and OA (knees non ambulatory uses gonzalez round). She was living at home with her daughter. Recently admitted to [facility] with MRSA bacteremia and subsequent pneumonia treated with Zyvox x 7 days with clearance of her bacteremia now admitted with back pain found to have T9-10 osteo discitis, T8-10 epidural phlegmon with associated paraverterbral abscesses. Spine infection likely hematogenous from under treated bacteremia. Neurosurgery has been consulted. She has been on Vancomycin and Ceftriaxone. Underwent T9-10 disc space biopsy with IR on 10/16. Cultures negative, though had been on IV antibiotics multiple days prior. Afebrile. HDS. Repeat blood cx sterile. ID rec cont vanc 1750mg q 24 till 12/3.     Overview/Hospital Course:  10/23 Here for skilled ; needing IV abx for spinal abscess;  vanc 1750mg q 24 till 12/3  Afebrile , 3LNC - O2 sat 95%   + debility, very deconditioned; bilt LE x 10 reps followed by bed mobility trng and static sit balance and tolerance at side of the bed  C/o back pain with activity; Occupational therapist notes that she is very resistant to movement and c/o severe pain with minimal activity.   Will order toradol 15mg x 1dose IV; pt did much better after toradol rx today per OT. Will order daily prior to OT as tolerated  Monitor renal fx     10/26 here for skilled therapy and cont IV abc. Vanc 1750mg iv every 12hr. Noted elevated WBC this am and she report that she has had diarrhea for the last 4 days.   · PT reports Supine to Sit: maximal assistance, of 2  persons and patient able to reach with UE to push through rail and with improved push off with UE and initiation  · Sit to Supine: maximal assistance of 2 people with improved ability of patient to control descent of upper body  · Transfers:     · Sit to Stand:  moderate assistance, maximal assistance of 2 persons with no AD.  PT and PT tech blocking both knees while assisting pt to elevate hips from bed  Balance: pt able to stand x15 seconds EOB limited largely by knee and back pain.       10/28  She is still on vanc IV daily. No longer with diarrhea. Did have heme positive stools H/H stable on lovenox for DVT prophylaxis and plavix. Will monitor h/h M/Thurs. No fever. No elevated WBC  pt is really immobile.  · Bed Mobility:     · Rolling Left:  moderate assistance  · Rolling Right: moderate assistance  · Scooting: maximal assistance  · Supine to Sit: maximum/moderate assistace and cues  · Sit to Supine: maximum assistace x 1-2 and cues  · Transfers:     · Sit to Stand:  maximum assistance and cues inside the parallel bars with facilitation tech  · Bed to Chair: to wheelchair  with  maximum assistance and cues  using  Slide Board  · Balance: Standing Static inside the parallel bars with Poor grade. Tolerated for ~10 seconds with 2 attempts with  Maximum assistance and cues.  Cont PT daily  10/30  IV  vanc 1,000mg q 24hr x 8 weeks total till 12/3 per ID for discitis; random vanc 12.5 yesterday    No longer with diarrhea. Did have heme positive stools H/H stable on lovenox for DVT prophylaxis and plavix. H&H stable, lovenox stopped. No fever. No elevated WBC. She has productive cough this am. K+ 3.2 yesterday, getting lasix, will repeat KCL 40meq today   pt is really immobile.Standing with RW Static: Poor for ~15 seconds tolerance with max Assistance and cues  Has PICC line- one port clotted;       11/2 she remains on vanc 1000mg  q 24hr x 8 weeks total till 12/3 per ID for discitis. Vanc trough 15.8 10/31/20.  Vss/afebrile. intermittent back pain with prn pain meds helping.   .  She is not doing much with PT. She uses gonzalez round at home but can transfer independently. Here she is max assist     Review of Systems   Constitutional: Negative for activity change, fatigue, fever and unexpected weight change.   HENT: Negative for congestion, ear pain, hearing loss, rhinorrhea and sore throat.    Eyes: Negative for redness and visual disturbance.   Respiratory: Negative for cough, shortness of breath and wheezing.    Cardiovascular: Negative for chest pain, palpitations and leg swelling.   Gastrointestinal: Negative for abdominal pain, constipation, diarrhea (watery, non-bloody), nausea and vomiting.   Genitourinary: Negative for dysuria, frequency and urgency.   Musculoskeletal: Positive for back pain. Negative for joint swelling and neck pain.   Skin: Negative for color change, rash and wound.   Neurological: Negative for dizziness, tremors, weakness, light-headedness and headaches.   Psychiatric/Behavioral: Negative for confusion and decreased concentration.     Objective:     Vital Signs (Most Recent):  Temp: 97.6 °F (36.4 °C) (11/02/20 0759)  Pulse: 83 (11/02/20 0759)  Resp: 20 (11/02/20 0818)  BP: (!) 127/58 (11/02/20 0759)  SpO2: (!) 92 % (11/02/20 0759) Vital Signs (24h Range):  Temp:  [97.6 °F (36.4 °C)-98.8 °F (37.1 °C)] 97.6 °F (36.4 °C)  Pulse:  [64-83] 83  Resp:  [16-20] 20  SpO2:  [91 %-93 %] 92 %  BP: (119-127)/(56-58) 127/58     Weight: 134.1 kg (295 lb 10.2 oz)  Body mass index is 42.42 kg/m².    Physical Exam  Vitals signs and nursing note reviewed.   Constitutional:       General: She is not in acute distress.     Appearance: She is well-developed. She is obese.   HENT:      Head: Normocephalic and atraumatic.      Right Ear: External ear normal.      Left Ear: External ear normal.      Nose: Nose normal.      Mouth/Throat:      Mouth: Mucous membranes are moist.      Pharynx: Oropharynx is clear.   Eyes:       General:         Right eye: No discharge.         Left eye: No discharge.      Extraocular Movements: Extraocular movements intact.      Conjunctiva/sclera: Conjunctivae normal.      Pupils: Pupils are equal, round, and reactive to light.   Neck:      Musculoskeletal: Neck supple.      Thyroid: No thyromegaly.   Cardiovascular:      Rate and Rhythm: Normal rate and regular rhythm.      Heart sounds: No murmur.   Pulmonary:      Effort: Pulmonary effort is normal. No respiratory distress.      Breath sounds: No wheezing, rhonchi or rales.   Abdominal:      General: Bowel sounds are normal. There is no distension.      Palpations: Abdomen is soft.      Tenderness: There is no abdominal tenderness.   Musculoskeletal:      Right lower leg: No edema.      Left lower leg: No edema.   Lymphadenopathy:      Cervical: No cervical adenopathy.   Skin:     General: Skin is warm and dry.   Neurological:      General: No focal deficit present.      Mental Status: She is alert.      Cranial Nerves: No cranial nerve deficit.      Comments: Obese, not very cooperative with PT.  Max assistance   Psychiatric:         Behavior: Behavior normal.           CRANIAL NERVES     CN III, IV, VI   Pupils are equal, round, and reactive to light.       Significant Labs:   CBC:   Recent Labs   Lab 11/02/20  0310   WBC 8.87   HGB 9.6*   HCT 32.7*   *     BMP  Lab Results   Component Value Date     11/02/2020    K 3.7 11/02/2020     11/02/2020    CO2 30 (H) 11/02/2020    BUN 10 11/02/2020    CREATININE 0.9 11/02/2020    CALCIUM 10.0 11/02/2020    ANIONGAP 10 11/02/2020    ESTGFRAFRICA >60 11/02/2020    EGFRNONAA >60 11/02/2020     10/23 vanc trough 20.5    Body Fluid Type  Other (Specify)    Comment: T9-T10   Fluid Appearance  Clear    Fluid Color  Icteric    WBC, Body Fluid /cu mm 10    Comment: Reference ranges for body fluids not established.   Correlate clinically.    Segs, Fluid % 65    Lymphs, Fluid % 23     Monocytes/Macrophages, Fluid % 12    Aerobic culture no growth   Fungal culture in process  10/8 blood culture NGTD x 5    9/23 METHICILLIN RESISTANT STAPHYLOCOCCUS AUREUS  Clindamycin <=0.5 mcg/mL Sensitive      Erythromycin <=0.5 mcg/mL Sensitive     Oxacillin >2 mcg/mL Resistant     Penicillin 8 mcg/mL Resistant     Tetracycline <=4 mcg/mL Sensitive     Trimeth/Sulfa <=0.5/9.5 m... Sensitive     Vancomycin 2 mcg/mL Sensitive      Significant Imaging:     10/19 CXR There is cardiomegaly.  Lungs are clear.  Central line tip upper SVC    10/13 CT lumbar   Findings above consistent with T9-T10 discitis/osteomyelitis with multilocular paravertebral abscesses, better evaluated on MRI 10/12/2020.  No significant retropulsion osseous fragments into the canal.     Multilevel lumbar spondylosis resulting in moderate to severe spinal canal stenosis from L2-L3 through L4-L5 and severe bilateral neural foraminal narrowing at L5-S1.     Mild multilevel thoracic spondylosis, detailed above.     Bilateral pleural effusions, larger on the left with compressive atelectasis/volume loss of adjacent lung parenchyma.     Moderate-large size hiatal hernia     Multi-vessel coronary artery atherosclerosis.     10/13 CT thoracic spine   Findings above consistent with T9-T10 discitis/osteomyelitis with multilocular paravertebral abscesses, better evaluated on MRI 10/12/2020.  No significant retropulsion osseous fragments into the canal.     Multilevel lumbar spondylosis resulting in moderate to severe spinal canal stenosis from L2-L3 through L4-L5 and severe bilateral neural foraminal narrowing at L5-S1.     Mild multilevel thoracic spondylosis, detailed above.     Bilateral pleural effusions, larger on the left with compressive atelectasis/volume loss of adjacent lung parenchyma.     Moderate-large size hiatal hernia     Multi-vessel coronary artery atherosclerosis.    10/12 MRI thoracic Discitis/osteomyelitis at the T9-T10 level with  multilocular paravertebral abscesses similar to prior exam.     T8-T10 epidural phlegmon results in moderate narrowing of the thecal sac. There is associated nerve root enhancement, which may be seen with arachnoiditis.     Bilateral complex pleural effusions with enhancement.  Empyema is a consideration.  Consider further evaluation with contrast enhanced chest CT.     Multilevel mild degenerative changes and disc bulges in the thoracic spine, most prominent at T5-T6.    10/12 MRI lumbar No evidence for spondylodiscitis.     Bilateral L5 spondylolysis with grade 2 anterolisthesis.     Multilevel degenerative changes of the lumbar spine detailed above.  Moderate to severe spinal canal stenosis noted at L2-L5.  Severe neural foraminal narrowing noted at L5-S1.    9/23 EKG Normal sinus rhythm  Nonspecific ST and T wave abnormality  Abnormal ECG  When compared with ECG of 10-AUG-2020 06:42,  Nonspecific T wave abnormality now evident in Lateral leads  Confirmed by Willie Matthews MD ([id number]) on 9/23/2020 3:05:10 PM      Assessment/Plan:      * Debility  Was walking with walker prior to pneumonia/bacteremia admit last month then after d.c was only w/c bound (gonzalez round) will add pt here and try and get her as strong as poss as she lives at home with family.   10/23 Chronic co back ache but also has recent high ankle fx ; per EDDIE Vinson NP Spoke with Ariela VASQUES who will see patient while on SWING. She looked over x rays and hx and reports that patient can weight bear as tolerated. Nurse/OT/MD notified    · 10/26 Supine to Sit: maximal assistance, of 2 persons and patient able to reach with UE to push through rail and with improved push off with UE and initiation  · Sit to Supine: maximal assistance of 2 people with improved ability of patient to control descent of upper body  · Transfers:     · Sit to Stand:  moderate assistance, maximal assistance of 2 persons with no AD.  PT and PT tech blocking both knees while  "assisting pt to elevate hips from bed  Balance: pt able to stand x15 seconds EOB limited largely by knee and back pain.     10/28  · Bed Mobility:     · Rolling Left:  moderate assistance  · Rolling Right: moderate assistance  · Scooting: maximal assistance  · Supine to Sit: maximum/moderate assistace and cues  · Sit to Supine: maximum assistace x 1-2 and cues  · Transfers:     · Sit to Stand:  maximum assistance and cues inside the parallel bars with facilitation tech  · Bed to Chair: to wheelchair  with  maximum assistance and cues  using  Slide Board  · Balance: Standing Static inside the parallel bars with Poor grade. Tolerated for ~10 seconds with 2 attempts with  Maximum assistance and cues.  10/30 Standing with RW Static: Poor for ~15 seconds tolerance with max Assistance and cues  11/2  wheelchair maximum assistance x 2 and cues  with  slide board  using  slide/scoot tech    Cough productive of purulent sputum  Productive cough this am per nurse- " pale green, grey"   Add mucinex and give neb tx q 6 while awake  No fever, WBC normal   Increase activity         Diarrhea  Stools d/c as diarrhea has improved. + heme occult     Malnutrition of mild degree  Dietary  boost      Hydronephrosis  Cont asa, plavix, lasix, isosorbide, lisinopril, toprol and statin      CAD (coronary artery disease)  Cont asa, plavix, lasix, isosorbide, lisinopril, toprol and statin  Time to stop Lovenox      HTN (hypertension)  Increase lisinopril 2.5>10mg daily  10/23 SBP 160s at times; lisinopril just increased will give more time for lisinopril to work before titration  10/26 SBP 140s; cont to monitor  10/28 /80- increase lisinopril  10/30 BP much better 119/59- 138/62    11/2 /58    Discitis  vanc 1450mg IV every 24hr x 8 weeks total till 12/3 per ID ; will stay here for the duration   PT  vanc now 1000mg IV every 24hr till 12/3 per ID last vanc trough 10/31 15.8    Reach out to ns today for guidance on " re-imaging    Severe obesity (BMI >= 40)  Using boost as she has low appetite and low protein       Obstructive sleep apnea treated with continuous positive airway pressure (CPAP)  Bring home cpap      Dementia without behavioral disturbance  Cont namenda and aricept         VTE Risk Mitigation (From admission, onward)    None          Discharge Planning   BRIT: 12/3/2020     Code Status: DNR   Is the patient medically ready for discharge?:     Reason for patient still in hospital (select all that apply): Treatment  Discharge Plan A: Home with family, Home Health                  Elisa Ahn MD  Department of Hospital Medicine   [facility]

## 2020-11-02 NOTE — ASSESSMENT & PLAN NOTE
Was walking with walker prior to pneumonia/bacteremia admit last month then after d.c was only w/c bound (gonzalez round) will add pt here and try and get her as strong as poss as she lives at home with family.   10/23 Chronic co back ache but also has recent high ankle fx ; per EDDIE Vinson NP Spoke with Ariela VASQUES who will see patient while on SWING. She looked over x rays and hx and reports that patient can weight bear as tolerated. Nurse/OT/MD notified    · 10/26 Supine to Sit: maximal assistance, of 2 persons and patient able to reach with UE to push through rail and with improved push off with UE and initiation  · Sit to Supine: maximal assistance of 2 people with improved ability of patient to control descent of upper body  · Transfers:     · Sit to Stand:  moderate assistance, maximal assistance of 2 persons with no AD.  PT and PT tech blocking both knees while assisting pt to elevate hips from bed  Balance: pt able to stand x15 seconds EOB limited largely by knee and back pain.     10/28  · Bed Mobility:     · Rolling Left:  moderate assistance  · Rolling Right: moderate assistance  · Scooting: maximal assistance  · Supine to Sit: maximum/moderate assistace and cues  · Sit to Supine: maximum assistace x 1-2 and cues  · Transfers:     · Sit to Stand:  maximum assistance and cues inside the parallel bars with facilitation tech  · Bed to Chair: to wheelchair  with  maximum assistance and cues  using  Slide Board  · Balance: Standing Static inside the parallel bars with Poor grade. Tolerated for ~10 seconds with 2 attempts with  Maximum assistance and cues.  10/30 Standing with RW Static: Poor for ~15 seconds tolerance with max Assistance and cues  11/2  wheelchair maximum assistance x 2 and cues  with  slide board  using  slide/scoot tech

## 2020-11-02 NOTE — SUBJECTIVE & OBJECTIVE
Review of Systems   Constitutional: Negative for activity change, fatigue, fever and unexpected weight change.   HENT: Negative for congestion, ear pain, hearing loss, rhinorrhea and sore throat.    Eyes: Negative for redness and visual disturbance.   Respiratory: Negative for cough, shortness of breath and wheezing.    Cardiovascular: Negative for chest pain, palpitations and leg swelling.   Gastrointestinal: Negative for abdominal pain, constipation, diarrhea (watery, non-bloody), nausea and vomiting.   Genitourinary: Negative for dysuria, frequency and urgency.   Musculoskeletal: Positive for back pain. Negative for joint swelling and neck pain.   Skin: Negative for color change, rash and wound.   Neurological: Negative for dizziness, tremors, weakness, light-headedness and headaches.   Psychiatric/Behavioral: Negative for confusion and decreased concentration.     Objective:     Vital Signs (Most Recent):  Temp: 97.6 °F (36.4 °C) (11/02/20 0759)  Pulse: 83 (11/02/20 0759)  Resp: 20 (11/02/20 0818)  BP: (!) 127/58 (11/02/20 0759)  SpO2: (!) 92 % (11/02/20 0759) Vital Signs (24h Range):  Temp:  [97.6 °F (36.4 °C)-98.8 °F (37.1 °C)] 97.6 °F (36.4 °C)  Pulse:  [64-83] 83  Resp:  [16-20] 20  SpO2:  [91 %-93 %] 92 %  BP: (119-127)/(56-58) 127/58     Weight: 134.1 kg (295 lb 10.2 oz)  Body mass index is 42.42 kg/m².    Physical Exam  Vitals signs and nursing note reviewed.   Constitutional:       General: She is not in acute distress.     Appearance: She is well-developed. She is obese.   HENT:      Head: Normocephalic and atraumatic.      Right Ear: External ear normal.      Left Ear: External ear normal.      Nose: Nose normal.      Mouth/Throat:      Mouth: Mucous membranes are moist.      Pharynx: Oropharynx is clear.   Eyes:      General:         Right eye: No discharge.         Left eye: No discharge.      Extraocular Movements: Extraocular movements intact.      Conjunctiva/sclera: Conjunctivae normal.       Pupils: Pupils are equal, round, and reactive to light.   Neck:      Musculoskeletal: Neck supple.      Thyroid: No thyromegaly.   Cardiovascular:      Rate and Rhythm: Normal rate and regular rhythm.      Heart sounds: No murmur.   Pulmonary:      Effort: Pulmonary effort is normal. No respiratory distress.      Breath sounds: No wheezing, rhonchi or rales.   Abdominal:      General: Bowel sounds are normal. There is no distension.      Palpations: Abdomen is soft.      Tenderness: There is no abdominal tenderness.   Musculoskeletal:      Right lower leg: No edema.      Left lower leg: No edema.   Lymphadenopathy:      Cervical: No cervical adenopathy.   Skin:     General: Skin is warm and dry.   Neurological:      General: No focal deficit present.      Mental Status: She is alert.      Cranial Nerves: No cranial nerve deficit.      Comments: Obese, not very cooperative with PT.  Max assistance   Psychiatric:         Behavior: Behavior normal.           CRANIAL NERVES     CN III, IV, VI   Pupils are equal, round, and reactive to light.       Significant Labs:   CBC:   Recent Labs   Lab 11/02/20  0310   WBC 8.87   HGB 9.6*   HCT 32.7*   *     BMP  Lab Results   Component Value Date     11/02/2020    K 3.7 11/02/2020     11/02/2020    CO2 30 (H) 11/02/2020    BUN 10 11/02/2020    CREATININE 0.9 11/02/2020    CALCIUM 10.0 11/02/2020    ANIONGAP 10 11/02/2020    ESTGFRAFRICA >60 11/02/2020    EGFRNONAA >60 11/02/2020     10/23 vanc trough 20.5    Body Fluid Type  Other (Specify)    Comment: T9-T10   Fluid Appearance  Clear    Fluid Color  Icteric    WBC, Body Fluid /cu mm 10    Comment: Reference ranges for body fluids not established.   Correlate clinically.    Segs, Fluid % 65    Lymphs, Fluid % 23    Monocytes/Macrophages, Fluid % 12    Aerobic culture no growth   Fungal culture in process  10/8 blood culture NGTD x 5    9/23 METHICILLIN RESISTANT STAPHYLOCOCCUS AUREUS  Clindamycin <=0.5 mcg/mL  Sensitive      Erythromycin <=0.5 mcg/mL Sensitive     Oxacillin >2 mcg/mL Resistant     Penicillin 8 mcg/mL Resistant     Tetracycline <=4 mcg/mL Sensitive     Trimeth/Sulfa <=0.5/9.5 m... Sensitive     Vancomycin 2 mcg/mL Sensitive      Significant Imaging:     10/19 CXR There is cardiomegaly.  Lungs are clear.  Central line tip upper SVC    10/13 CT lumbar   Findings above consistent with T9-T10 discitis/osteomyelitis with multilocular paravertebral abscesses, better evaluated on MRI 10/12/2020.  No significant retropulsion osseous fragments into the canal.     Multilevel lumbar spondylosis resulting in moderate to severe spinal canal stenosis from L2-L3 through L4-L5 and severe bilateral neural foraminal narrowing at L5-S1.     Mild multilevel thoracic spondylosis, detailed above.     Bilateral pleural effusions, larger on the left with compressive atelectasis/volume loss of adjacent lung parenchyma.     Moderate-large size hiatal hernia     Multi-vessel coronary artery atherosclerosis.     10/13 CT thoracic spine   Findings above consistent with T9-T10 discitis/osteomyelitis with multilocular paravertebral abscesses, better evaluated on MRI 10/12/2020.  No significant retropulsion osseous fragments into the canal.     Multilevel lumbar spondylosis resulting in moderate to severe spinal canal stenosis from L2-L3 through L4-L5 and severe bilateral neural foraminal narrowing at L5-S1.     Mild multilevel thoracic spondylosis, detailed above.     Bilateral pleural effusions, larger on the left with compressive atelectasis/volume loss of adjacent lung parenchyma.     Moderate-large size hiatal hernia     Multi-vessel coronary artery atherosclerosis.    10/12 MRI thoracic Discitis/osteomyelitis at the T9-T10 level with multilocular paravertebral abscesses similar to prior exam.     T8-T10 epidural phlegmon results in moderate narrowing of the thecal sac. There is associated nerve root enhancement, which may be seen  with arachnoiditis.     Bilateral complex pleural effusions with enhancement.  Empyema is a consideration.  Consider further evaluation with contrast enhanced chest CT.     Multilevel mild degenerative changes and disc bulges in the thoracic spine, most prominent at T5-T6.    10/12 MRI lumbar No evidence for spondylodiscitis.     Bilateral L5 spondylolysis with grade 2 anterolisthesis.     Multilevel degenerative changes of the lumbar spine detailed above.  Moderate to severe spinal canal stenosis noted at L2-L5.  Severe neural foraminal narrowing noted at L5-S1.    9/23 EKG Normal sinus rhythm  Nonspecific ST and T wave abnormality  Abnormal ECG  When compared with ECG of 10-AUG-2020 06:42,  Nonspecific T wave abnormality now evident in Lateral leads  Confirmed by Willie Matthews MD ([id number]) on 9/23/2020 3:05:10 PM

## 2020-11-02 NOTE — PT/OT/SLP PROGRESS
"Physical Therapy Treatment    Patient Name:  Martina Betancourt   MRN:  [id number]    Recommendations:     Discharge Recommendations:  nursing facility, basic   Discharge Equipment Recommendations: lift device   Barriers to discharge: Decreased caregiver support    Assessment:     Martina Betancourt is a 70 y.o. female admitted with a medical diagnosis of Debility.  She presents with the following impairments/functional limitations:  weakness, impaired endurance, impaired self care skills, impaired functional mobilty, gait instability, impaired balance, impaired cognition, decreased upper extremity function, decreased lower extremity function, decreased safety awareness, pain. Patient participated well today and has increased tolerance on static stand at bedside using Standard Walker x 2 minutes with maximum assistance x 1 and cues to inc equal weight distribution.    Rehab Prognosis: Fair; patient would benefit from acute skilled PT services to address these deficits and reach maximum level of function.    Recent Surgery: * No surgery found *      Plan:     During this hospitalization, patient to be seen daily to address the identified rehab impairments via gait training, therapeutic activities, therapeutic exercises and progress toward the following goals:    · Plan of Care Expires:  11/10/20    Subjective     Chief Complaint: Right lower back pain today. No aggravating pain on the knees during standing trng today.  Patient/Family Comments/goals: "To try my best in Therapy".  Pain/Comfort:  · Pain Rating 1: 9/10  · Location - Side 1: Right  · Location - Orientation 1: lower  · Location 1: back  · Pain Addressed 1: Reposition, Pre-medicate for activity, Cessation of Activity  · Pain Rating Post-Intervention 1: 8/10      Objective:     Communicated with patient  prior to session.  Patient found HOB elevated with peripheral IV, PureWick, oxygen upon PT entry to room.     General Precautions: Standard, fall   Orthopedic " Precautions:Full weight bearing   Braces: N/A     Functional Mobility:  · Bed Mobility:     · Rolling Left:  contact guard assistance and cues  · Rolling Right: contact guard assistance and cues  · Scooting: moderate/minimal assistance and cues  · Supine to Sit: minimal assistance and cues  · Sit to Supine: maximum/moderate assistance and cues  · Transfers:     · Sit to Stand:  maximum assistance and cues with standard walker  · Balance: Standing with standard walker Static: Poor+ for 2 minutes toelrance with max assistance and cues.      AM-PAC 6 CLICK MOBILITY  Turning over in bed (including adjusting bedclothes, sheets and blankets)?: 3  Sitting down on and standing up from a chair with arms (e.g., wheelchair, bedside commode, etc.): 2  Moving from lying on back to sitting on the side of the bed?: 3  Moving to and from a bed to a chair (including a wheelchair)?: 2  Need to walk in hospital room?: 2  Climbing 3-5 steps with a railing?: 1  Basic Mobility Total Score: 13       Therapeutic Activities and Exercises:   Worked on body meachnics on bed mobility, sit to stand transfers with std walker and static stand balance and tolerance ex using std walker.    Patient left HOB elevated with all lines intact, call button in reach, bed alarm on and nursing notified..    GOALS:   Multidisciplinary Problems     Physical Therapy Goals        Problem: Physical Therapy Goal    Goal Priority Disciplines Outcome Goal Variances Interventions   Physical Therapy Goal     PT, PT/OT Ongoing, Progressing     Description: Goals to be met by: 11/10/20    Patient will increase functional independence with mobility by performin. Rolling to sides using bed rail with contact guard assistance and cues  2. Supine to sit with minimal assistance and cues  3. Sit to supine with moderate assistance and cues  4. Tolerate Static Sit at side of the bed for 10 minutes with Standby Assistance  5. Bed to chair transfer with moderate assistance  and cues using Slide Board TECHNIQUE  6. Lower extremity exercise program x15 reps per handout, with assistance as needed                      Time Tracking:     PT Received On: 11/02/20  PT Start Time: 1402     PT Stop Time: 1430  PT Total Time (min): 28 min     Billable Minutes: Therapeutic Activity 28    Treatment Type: Treatment  PT/PTA: PT     PTA Visit Number: 0     Edgar Lamas, PT  11/02/2020

## 2020-11-02 NOTE — ASSESSMENT & PLAN NOTE
Increase lisinopril 2.5>10mg daily  10/23 SBP 160s at times; lisinopril just increased will give more time for lisinopril to work before titration  10/26 SBP 140s; cont to monitor  10/28 /80- increase lisinopril  10/30 BP much better 119/59- 138/62    11/2 /58

## 2020-11-02 NOTE — ASSESSMENT & PLAN NOTE
vanc 1450mg IV every 24hr x 8 weeks total till 12/3 per ID ; will stay here for the duration   PT  vanc now 1000mg IV every 24hr till 12/3 per ID last vanc trough 10/31 15.8    Reach out to ns today for guidance on re-imaging

## 2020-11-02 NOTE — PT/OT/SLP PROGRESS
Occupational Therapy   Treatment    Name: Martina Betancourt  MRN: [id number]  Admitting Diagnosis:  Debility       Recommendations:     Discharge Recommendations: nursing facility, basic  Discharge Equipment Recommendations:  lift device  Barriers to discharge:       Assessment:     Martina Betancourt is a 70 y.o. female with a medical diagnosis of Debility.  She presents with increased pain this date impacting OOB participation, attempted bed mobility with total A and sit to supine but patient requesting to return to supine before transition complete due to reports of extreme pain. Patient was able to complete in bed UE ROM and educated to continue throughout the day.     Performance deficits affecting function are weakness, impaired endurance, impaired cognition, decreased coordination, impaired coordination, impaired self care skills, decreased upper extremity function, impaired functional mobilty, decreased lower extremity function, gait instability, decreased safety awareness, impaired balance, impaired cardiopulmonary response to activity.     Rehab Prognosis:  Fair; patient would benefit from acute skilled OT services to address these deficits and reach maximum level of function.       Plan:     Patient to be seen 5 x/week to address the above listed problems via self-care/home management, therapeutic activities, therapeutic exercises  · Plan of Care Expires: 11/04/20  · Plan of Care Reviewed with: patient    Subjective     Pain/Comfort:  · Pain Rating 1: 10/10  · Location 1: back  · Pain Rating 2: 10/10    Objective:     Communicated with: Nursing prior to session.  Patient found supine with oxygen, peripheral IV, PureWick upon OT entry to room.    General Precautions: Standard, fall   Orthopedic Precautions:Full weight bearing   Braces: N/A     Occupational Performance:     Bed Mobility:    · Patient completed Rolling/Turning to Left with  dependent  · Patient completed Rolling/Turning to Right with  dependent  · Patient completed Supine to Sit with unable to complete due to reports of pain     Functional Mobility/Transfers:  · Unable to complete    Activities of Daily Living:  · Attempted to sit EOB to complete UBD, unable to complete due to reports of pain      [facility] 6 Click ADL: 11    Treatment & Education:  Therapist educated patient on importance of getting OOB and on UE ROM program, patient verbalized understanding. Therapist facilitated L shoulder flex/ext, B elbow flex/ext, and B digit flex/ext 3 sets x 10 reps with verbal cues for pacing and proper mechanics     Patient left supine with all lines intact, call button in reach and nursing notifiedEducation:      GOALS:   Multidisciplinary Problems     Occupational Therapy Goals        Problem: Occupational Therapy Goal    Goal Priority Disciplines Outcome Interventions   Occupational Therapy Goal     OT, PT/OT Ongoing, Progressing    Description: Goals to be met by: 11/4/2020     Patient will increase functional independence with ADLs by performing:    Feeding with Supervision.  UE Dressing with Supervision.  LE Dressing with Moderate Assistance.  Grooming while seated with Supervision.  Toileting from bedside commode with Minimal Assistance for hygiene and clothing management.   Sitting at edge of bed x5 minutes with Supervision.  Rolling to Bilateral with Supervision.   Supine to sit with Minimal Assistance.  Squat pivot transfers with Minimal Assistance.  Toilet transfer to bedside commode with Minimal Assistance.  Upper extremity exercise program x10 reps per handout, with assistance as needed.                     Time Tracking:     OT Date of Treatment: 11/02/20  OT Start Time: 1025  OT Stop Time: 1051  OT Total Time (min): 26 min    Billable Minutes:Self Care/Home Management 8 minutes  Therapeutic Exercise 18 minutes    Golden Bartlett OT  11/2/2020

## 2020-11-03 PROCEDURE — 25000242 PHARM REV CODE 250 ALT 637 W/ HCPCS: Performed by: NURSE PRACTITIONER

## 2020-11-03 PROCEDURE — 25000003 PHARM REV CODE 250: Performed by: FAMILY MEDICINE

## 2020-11-03 PROCEDURE — 27000221 HC OXYGEN, UP TO 24 HOURS

## 2020-11-03 PROCEDURE — 11000004 HC SNF PRIVATE

## 2020-11-03 PROCEDURE — 94761 N-INVAS EAR/PLS OXIMETRY MLT: CPT

## 2020-11-03 PROCEDURE — 25000003 PHARM REV CODE 250: Performed by: INTERNAL MEDICINE

## 2020-11-03 PROCEDURE — 25000003 PHARM REV CODE 250: Performed by: NURSE PRACTITIONER

## 2020-11-03 PROCEDURE — 97535 SELF CARE MNGMENT TRAINING: CPT

## 2020-11-03 PROCEDURE — 63600175 PHARM REV CODE 636 W HCPCS: Performed by: FAMILY MEDICINE

## 2020-11-03 PROCEDURE — 94799 UNLISTED PULMONARY SVC/PX: CPT

## 2020-11-03 PROCEDURE — 94640 AIRWAY INHALATION TREATMENT: CPT

## 2020-11-03 PROCEDURE — A4216 STERILE WATER/SALINE, 10 ML: HCPCS | Performed by: INTERNAL MEDICINE

## 2020-11-03 PROCEDURE — 97530 THERAPEUTIC ACTIVITIES: CPT

## 2020-11-03 RX ADMIN — Medication 10 ML: at 05:11

## 2020-11-03 RX ADMIN — MEMANTINE 10 MG: 10 TABLET ORAL at 09:11

## 2020-11-03 RX ADMIN — ACETAMINOPHEN 500 MG: 500 TABLET, FILM COATED ORAL at 03:11

## 2020-11-03 RX ADMIN — ISOSORBIDE MONONITRATE 60 MG: 60 TABLET, EXTENDED RELEASE ORAL at 08:11

## 2020-11-03 RX ADMIN — METHOCARBAMOL TABLETS 500 MG: 500 TABLET, COATED ORAL at 01:11

## 2020-11-03 RX ADMIN — VANCOMYCIN HYDROCHLORIDE 1000 MG: 1 INJECTION, POWDER, LYOPHILIZED, FOR SOLUTION INTRAVENOUS at 12:11

## 2020-11-03 RX ADMIN — CALCIUM CARBONATE (ANTACID) CHEW TAB 500 MG 500 MG: 500 CHEW TAB at 09:11

## 2020-11-03 RX ADMIN — GUAIFENESIN 600 MG: 600 TABLET, EXTENDED RELEASE ORAL at 08:11

## 2020-11-03 RX ADMIN — IPRATROPIUM BROMIDE AND ALBUTEROL SULFATE 3 ML: .5; 3 SOLUTION RESPIRATORY (INHALATION) at 01:11

## 2020-11-03 RX ADMIN — IPRATROPIUM BROMIDE AND ALBUTEROL SULFATE 3 ML: .5; 3 SOLUTION RESPIRATORY (INHALATION) at 07:11

## 2020-11-03 RX ADMIN — HYDROCODONE BITARTRATE AND ACETAMINOPHEN 1 TABLET: 10; 325 TABLET ORAL at 10:11

## 2020-11-03 RX ADMIN — MICONAZOLE NITRATE: 20 OINTMENT TOPICAL at 09:11

## 2020-11-03 RX ADMIN — Medication 10 ML: at 01:11

## 2020-11-03 RX ADMIN — DONEPEZIL HYDROCHLORIDE 10 MG: 5 TABLET, FILM COATED ORAL at 09:11

## 2020-11-03 RX ADMIN — PRAVASTATIN SODIUM 40 MG: 40 TABLET ORAL at 09:11

## 2020-11-03 RX ADMIN — ACETAMINOPHEN 500 MG: 500 TABLET, FILM COATED ORAL at 09:11

## 2020-11-03 RX ADMIN — Medication 10 ML: at 11:11

## 2020-11-03 RX ADMIN — METOPROLOL SUCCINATE 50 MG: 50 TABLET, EXTENDED RELEASE ORAL at 08:11

## 2020-11-03 RX ADMIN — ASPIRIN 81 MG: 81 TABLET, COATED ORAL at 08:11

## 2020-11-03 RX ADMIN — FERROUS SULFATE TAB 325 MG (65 MG ELEMENTAL FE) 325 MG: 325 (65 FE) TAB at 09:11

## 2020-11-03 RX ADMIN — PANTOPRAZOLE SODIUM 40 MG: 40 TABLET, DELAYED RELEASE ORAL at 05:11

## 2020-11-03 RX ADMIN — METHOCARBAMOL TABLETS 500 MG: 500 TABLET, COATED ORAL at 08:11

## 2020-11-03 RX ADMIN — VANCOMYCIN HYDROCHLORIDE 1000 MG: 1 INJECTION, POWDER, LYOPHILIZED, FOR SOLUTION INTRAVENOUS at 11:11

## 2020-11-03 RX ADMIN — DULOXETINE 60 MG: 30 CAPSULE, DELAYED RELEASE ORAL at 08:11

## 2020-11-03 RX ADMIN — HYDROCODONE BITARTRATE AND ACETAMINOPHEN 1 TABLET: 10; 325 TABLET ORAL at 04:11

## 2020-11-03 RX ADMIN — ACETAMINOPHEN 500 MG: 500 TABLET, FILM COATED ORAL at 08:11

## 2020-11-03 RX ADMIN — LISINOPRIL 10 MG: 10 TABLET ORAL at 08:11

## 2020-11-03 RX ADMIN — FUROSEMIDE 40 MG: 40 TABLET ORAL at 08:11

## 2020-11-03 RX ADMIN — METHOCARBAMOL TABLETS 500 MG: 500 TABLET, COATED ORAL at 04:11

## 2020-11-03 RX ADMIN — GUAIFENESIN 600 MG: 600 TABLET, EXTENDED RELEASE ORAL at 09:11

## 2020-11-03 RX ADMIN — METHOCARBAMOL TABLETS 500 MG: 500 TABLET, COATED ORAL at 09:11

## 2020-11-03 RX ADMIN — Medication 10 ML: at 12:11

## 2020-11-03 RX ADMIN — MEMANTINE 10 MG: 10 TABLET ORAL at 08:11

## 2020-11-03 RX ADMIN — CLOPIDOGREL 75 MG: 75 TABLET, FILM COATED ORAL at 08:11

## 2020-11-03 NOTE — PLAN OF CARE
Pt remains on swing for antibiotic therapy secondary to epidural abscesses. On vancomycin daily. PICC line to right upper arm; red port flushes easily with blood return noted; purple port occluded. Vitals stable. Afebrile. PT and OT consulted. Frequent complaints of back pain; relieved with PRN medication. External catheter in use. Redness to groin and buttocks; cream applied. Pressure wound to right heel; painting with betadine daily. Encouraging pt to participate in turns. Remains free from injury/falls. Reviewed plan of care with pt; states agreement.

## 2020-11-03 NOTE — PT/OT/SLP PROGRESS
Occupational Therapy   Treatment    Name: Martina Betancourt  MRN: [id number]  Admitting Diagnosis:  Debility       Recommendations:     Discharge Recommendations: nursing facility, basic  Discharge Equipment Recommendations:  lift device  Barriers to discharge:       Assessment:     Martina Betancourt is a 70 y.o. female with a medical diagnosis of Debility.  She presents with improved bed mobility initiation and UBD with Min A sitting EOB. Therapist attempted sit to stand from EOB with total x 2, unable to achieve standing position on 3 attempts, patient reporting severe pain and unable to assist.     Performance deficits affecting function are weakness, impaired endurance, impaired cognition, decreased coordination, impaired coordination, impaired self care skills, decreased upper extremity function, impaired functional mobilty, decreased lower extremity function, gait instability, decreased safety awareness, edema, pain, impaired cardiopulmonary response to activity, impaired balance.     Rehab Prognosis:  Fair; patient would benefit from acute skilled OT services to address these deficits and reach maximum level of function.       Plan:     Patient to be seen 5 x/week to address the above listed problems via self-care/home management, therapeutic activities, therapeutic exercises  · Plan of Care Expires: 11/04/20  · Plan of Care Reviewed with: patient    Subjective     Pain/Comfort:  · Pain Rating 1: 10/10  · Location - Orientation 1: lower  · Location 1: back  · Pain Addressed 1: Reposition, Nurse notified  · Pain Rating Post-Intervention 1: 6/10    Objective:     Communicated with: Nursing prior to session.  Patient found supine with peripheral IV, PureWick, oxygen upon OT entry to room.    General Precautions: Standard, fall   Orthopedic Precautions:Full weight bearing   Braces: N/A     Occupational Performance:     Bed Mobility:    · Patient completed Rolling/Turning to Right with stand by assistance  · Patient  completed Scooting/Bridging with maximal assistance  · Patient completed Supine to Sit with minimum assistance  · Patient completed Sit to Supine with maximal assistance     Functional Mobility/Transfers:  · Patient completed Sit <> Stand Transfer with dependence, of 2 persons and unable to achieve standing position on 3 attempts  with  rolling walker   · Functional Mobility: not completed    Activities of Daily Living:  · Grooming: stand by assistance with setup to brush hair EOB  · Upper Body Dressing: minimum assistance to don/doff gown      [facility] 6 Click ADL: 12    Treatment & Education:  Therapist facilitated self care EOB with verbal cues for safety and postural control. Patient with improved initiation and less verbalizations of pain during bed mobility, sit to stand attempt x 3 trials but unable to achieve.     Patient left supine with all lines intact, call button in reach and nursing presentEducation:      GOALS:   Multidisciplinary Problems     Occupational Therapy Goals        Problem: Occupational Therapy Goal    Goal Priority Disciplines Outcome Interventions   Occupational Therapy Goal     OT, PT/OT Ongoing, Progressing    Description: Goals to be met by: 11/4/2020     Patient will increase functional independence with ADLs by performing:    Feeding with Supervision.  UE Dressing with Supervision.  LE Dressing with Moderate Assistance.  Grooming while seated with Supervision.  Toileting from bedside commode with Minimal Assistance for hygiene and clothing management.   Sitting at edge of bed x5 minutes with Supervision.  Rolling to Bilateral with Supervision.   Supine to sit with Minimal Assistance.  Squat pivot transfers with Minimal Assistance.  Toilet transfer to bedside commode with Minimal Assistance.  Upper extremity exercise program x10 reps per handout, with assistance as needed.                     Time Tracking:     OT Date of Treatment: 11/03/20  OT Start Time: 1020  OT Stop Time: 1044  OT  Total Time (min): 24 min    Billable Minutes:Self Care/Home Management 24 minutes    Golden Bartlett OT  11/3/2020

## 2020-11-03 NOTE — PLAN OF CARE
Recommendation:   1. Recommend Boost milkshake QD to provide an additional 500kcals and 18g PRO.    2. Encouraged increased PO intake by obtaining food preferences.  3. RD to follow and make rec.s accordingly.     Goals: Pt. will meet at least 75% ENN via meals and supplements by next RD follow up.  Nutrition Goal Status: new    Interventions(treatment strategy):  Sodium modified diet  Collaboration with other providers  Commercial food    Nutrition Discharge Planning: Cardiac

## 2020-11-03 NOTE — PLAN OF CARE
Patient Agrees and Compliant with Plan of Care:  Monitor Vital Signs; Vital signs stable.  Maintain Pain Regimen; Patient received Hydrocodone X 1 this shift for c/o lower back pain with relief.  Monitor Breathing Pattern; Bilateral Breath sounds auscultated posteriorly and diminished throughout. Oxygen saturation 94% on oxygen 3L ns.  Maintain Skin Integrity; Note reddened areas to folds of abdomen and under breast and sacral area . Areas Dried cleaned and dried and Antifungal barrier cream to areas.   Administer IV antibiotic as ordered; Patient received Vancomycin 1gm this shift. Monitor Vancomycin troughs as ordered; Trough drawn tonight and as 16.6. No change in Vancomycin noted.  Monitor PICC line; Red port flushing well with good blood return..  PT working with patient. Patient appears stronger. Assisting with repositioning. Encouraged to keep heels up off bed..  Fall Precautions;Maintain Patient Safety; No falls or injury noted this shift.

## 2020-11-03 NOTE — PATIENT CARE CONFERENCE
SWING bed Skilled Nursing Patient Care Conference.         Skilled level of care weekly patient care conference complete. Patient will remain on swing to complete IV antibiotic therapy. Patient plan is to discharge home.         PT Recommendations:  Discharge Recommendations:  nursing facility, basic   Discharge Equipment Recommendations: lift device   Barriers to discharge: Decreased caregiver support     Progress toward goals:  Functional Mobility:  · Bed Mobility:     · Rolling Left:  contact guard assistance and cues  · Rolling Right: contact guard assistance and cues  · Scooting: moderate/minimal assistance and cues  · Supine to Sit: minimal assistance and cues  · Sit to Supine: maximum/moderate assistance and cues  · Transfers:     · Sit to Stand:  maximum assistance and cues with standard walker  · Balance: Standing with standard walker Static: Poor+ for 2 minutes toelrance with max assistance and cues.        AM-PAC 6 CLICK MOBILITY  Turning over in bed (including adjusting bedclothes, sheets and blankets)?: 3  Sitting down on and standing up from a chair with arms (e.g., wheelchair, bedside commode, etc.): 2  Moving from lying on back to sitting on the side of the bed?: 3  Moving to and from a bed to a chair (including a wheelchair)?: 2  Need to walk in hospital room?: 2  Climbing 3-5 steps with a railing?: 1  Basic Mobility Total Score: 13                    Therapeutic Activities and Exercises:   Worked on body meachnics on bed mobility, sit to stand transfers with std walker and static stand balance and tolerance ex using std walker.         OT Recommendations:  Discharge Recommendations: nursing facility, basic  Discharge Equipment Recommendations:  lift device  Barriers to discharge:       Progress toward goals:   Bed Mobility:    · Patient completed Rolling/Turning to Right with stand by assistance  · Patient completed Scooting/Bridging with maximal assistance  · Patient completed Supine to Sit with  minimum assistance  · Patient completed Sit to Supine with maximal assistance      Functional Mobility/Transfers:  · Patient completed Sit <> Stand Transfer with dependence, of 2 persons and unable to achieve standing position on 3 attempts  with  rolling walker   · Functional Mobility: not completed     Activities of Daily Living:  · Grooming: stand by assistance with setup to brush hair EOB  · Upper Body Dressing: minimum assistance to don/doff gown        [facility] 6 Click ADL: 12     Treatment & Education:  Therapist facilitated self care EOB with verbal cues for safety and postural control. Patient with improved initiation and less verbalizations of pain during bed mobility, sit to stand attempt x 3 trials but unable to achieve.           Conversation with patient and family:  Patient is still addiment about discharging home once swing bed is complete.

## 2020-11-03 NOTE — PLAN OF CARE
Problem: Physical Therapy Goal  Goal: Physical Therapy Goal  Description: Goals to be met by: 11/10/20    Patient will increase functional independence with mobility by performin. Rolling to sides using bed rail with contact guard assistance and cues  2. Supine to sit with minimal assistance and cues - met 20  3. Sit to supine with moderate assistance and cues  4. Tolerate Static Sit at side of the bed for 10 minutes with Standby Assistance - met 10/29/20   Updated 10/30/20:         Tolerate Static Stand using Std Walker  for ~ 1 - 2 minutes with max/moderate assistance and cues - met 11/3/20  Updated 11/3/20:           Tolerate Static Stand  using Bob Walker for ~ 3-4 minutes with max/moderate assistance and cues.  5. Bed to chair transfer with moderate assistance and cues using Slide Board TECHNIQUE  6. Lower extremity exercise program x15 reps per handout, with assistance as needed     Outcome: Ongoing, Progressing

## 2020-11-03 NOTE — PT/OT/SLP PROGRESS
"Physical Therapy Treatment    Patient Name:  Martina Betancourt   MRN:  [id number]    Recommendations:     Discharge Recommendations:  nursing facility, basic   Discharge Equipment Recommendations: lift device   Barriers to discharge: Decreased caregiver support    Assessment:     Martina Betancourt is a 70 y.o. female admitted with a medical diagnosis of Debility.  She presents with the following impairments/functional limitations:  weakness, impaired endurance, impaired cognition, impaired coordination, impaired self care skills, impaired functional mobilty, pain, gait instability, impaired balance, decreased safety awareness, decreased lower extremity function, decreased upper extremity function. Patient gradually increased static standing tolerance at bedside using Std Walker to ~2 minute and 7 seconds with max.moderate assistance and cues. Nursing able to change beddings while patient doing standing ex.    Rehab Prognosis: Fair; patient would benefit from acute skilled PT services to address these deficits and reach maximum level of function.    Recent Surgery: * No surgery found *      Plan:     During this hospitalization, patient to be seen daily to address the identified rehab impairments via gait training, therapeutic activities, therapeutic exercises and progress toward the following goals:    · Plan of Care Expires:  11/10/20    Subjective     Chief Complaint: chronic lower back pain upon transitional movement and bilat knees pain upon weight bearing activity.  Patient/Family Comments/goals: "To do my best in Therapy".  Pain/Comfort:  · Pain Rating 1: 8/10  · Location - Side 1: Right  · Location - Orientation 1: lower  · Location 1: back  · Pain Addressed 1: Reposition, Cessation of Activity, Pre-medicate for activity  · Pain Rating Post-Intervention 1: 7/10  · Pain Rating 2: 8/10  · Location - Side 2: Bilateral  · Location - Orientation 2: lower  · Location 2: knee  · Pain Addressed 2: Cessation of Activity, " Pre-medicate for activity  · Pain Rating Post-Intervention 2: 7/10      Objective:     Communicated with patient prior to session.  Patient found supine with peripheral IV, PureWick upon PT entry to room.     General Precautions: Standard, fall   Orthopedic Precautions:Full weight bearing   Braces: N/A     Functional Mobility:  · Bed Mobility:     · Rolling Left:  contact guard assistance and cues using bedrail  · Rolling Right: contact guard assistance and cues using bedrail  · Scooting: minimal assistance and cues  · Supine to Sit: minimal assistance and cues  · Sit to Supine: max/moderate assistance and cues  · Transfers:     · Sit to Stand:  Max/moderate assistance and cues with standard walker  · Balance: Static Stand with Std Walker: Fair- with 2 mins and 7 seconds  tolerance and max/moderate assistance and cues.      AM-PAC 6 CLICK MOBILITY  Turning over in bed (including adjusting bedclothes, sheets and blankets)?: 3  Sitting down on and standing up from a chair with arms (e.g., wheelchair, bedside commode, etc.): 2  Moving from lying on back to sitting on the side of the bed?: 3  Moving to and from a bed to a chair (including a wheelchair)?: 2  Need to walk in hospital room?: 2  Climbing 3-5 steps with a railing?: 1  Basic Mobility Total Score: 13       Therapeutic Activities and Exercises:   Worked on body mechanics on bed mobility, sit to stand transfers with std walker and static standing balance and tolerance ex with std walker.    Patient left supine with all lines intact, call button in reach, bed alarm on and nursing notified..    GOALS:   Multidisciplinary Problems     Physical Therapy Goals        Problem: Physical Therapy Goal    Goal Priority Disciplines Outcome Goal Variances Interventions   Physical Therapy Goal     PT, PT/OT Ongoing, Progressing     Description: Goals to be met by: 11/10/20    Patient will increase functional independence with mobility by performin. Rolling to sides  using bed rail with contact guard assistance and cues  2. Supine to sit with minimal assistance and cues  3. Sit to supine with moderate assistance and cues  4. Tolerate Static Sit at side of the bed for 10 minutes with Standby Assistance  5. Bed to chair transfer with moderate assistance and cues using Slide Board TECHNIQUE  6. Lower extremity exercise program x15 reps per handout, with assistance as needed                      Time Tracking:     PT Received On: 11/03/20  PT Start Time: 1334     PT Stop Time: 1355  PT Total Time (min): 21 min     Billable Minutes: Therapeutic Activity 21    Treatment Type: Treatment  PT/PTA: PT     PTA Visit Number: 0     Edgar Lamas, PT  11/03/2020

## 2020-11-03 NOTE — PROGRESS NOTES
[facility]  Adult Nutrition  Progress Note    SUMMARY       Recommendations    Recommendation:   1. Recommend Boost milkshake QD to provide an additional 500kcals and 18g PRO.    2. Encouraged increased PO intake by obtaining food preferences.  3. RD to follow and make rec.s accordingly.    Goals: Pt. will meet at least 75% ENN via meals and supplements by next RD follow up.  Nutrition Goal Status: new  Communication of RD Recs: (POC, second sign)    Reason for Assessment    Reason For Assessment: RD follow-up  Diagnosis: other (see comments)(Debility)  Relevant Medical History: HTN, CAD, obesity, HLD, myopathy  Interdisciplinary Rounds: did not attend    General Information Comments:  10/20:Pt admitted for SWING. She consumed 25-50% of meals yesterday and 0% so far today (very little bites). Per chart review, pt with a 42lb (12.5%) significant wt loss in 3 months , and Pt states she has lost 60lbs since june. NFPE performed today, mild wasting found in temples and orbital region; pt meets criteria for moderate malnutrition.      10/27: Pt states her appettie is good and that she is eating all of her + Boost Pudding, but flowsheets show Meal intake is fluctuating from 0-50%. On IV abx. Positive for diarrhea. Stage 2 wound to right heel. Spoke with MD about nutritional needs, states they will monitor.      11/3-Spoke to pt. Obtained food preferences. Pt. states that Boost Pudding causes stomach discomfort. Pt. States that Boost Plus causes diarrhea. Pt.s intake improving at 75%. Pt. States she doesn't eat 100% of meals due to dislike of vegetables.     Nutrition Discharge Planning: Cardiac      Nutrition Risk Screen    Nutrition Risk Screen: no indicators present    Nutrition/Diet History    Spiritual, Cultural Beliefs, Buddhist Practices, Values that Affect Care: no  Food Allergies: NKFA  Factors Affecting Nutritional Intake: impaired cognitive status/motor control    Anthropometrics    Temp:  "98.1 °F (36.7 °C)  Height Method: Stated  Height: 5' 10" (177.8 cm)  Height (inches): 70 in  Weight Method: Bed Scale  Weight: 134.1 kg (295 lb 10.2 oz)  Weight (lb): 295.64 lb  Ideal Body Weight (IBW), Female: 150 lb  % Ideal Body Weight, Female (lb): 195.47 %  BMI (Calculated): 42.4  BMI Grade: greater than 40 - morbid obesity  Usual Body Weight (UBW), kg: (!) 156.8 kg  % Usual Body Weight: 85       Lab/Procedures/Meds    Pertinent Labs Reviewed: reviewed  Pertinent Labs Comments: Hgb/Hct 9.6/32.7(L), CO2 30(H), alb. 2.3(L)  Pertinent Medications Reviewed: reviewed  Pertinent Medications Comments: Nebulizer, lisinopril, iron supplement, furosemide, metoprolol succinate, normal saline, pravastatin    Estimated/Assessed Needs    Weight Used For Calorie Calculations: 133 kg (293 lb 3.4 oz)  Energy Calorie Requirements (kcal): 1920  Energy Need Method: [place]-St Jeor(no AF)  Protein Requirements: 106-133 g/d  Weight Used For Protein Calculations: 133 kg (293 lb 3.4 oz)     Estimated Fluid Requirement Method: RDA Method(or per MD)  RDA Method (mL): 1920     Nutrition Prescription Ordered    Current Diet Order: Cardiac    Evaluation of Received Nutrient/Fluid Intake    Fluid Required: meeting needs  Comments: LBM 10/26  % Intake of Estimated Energy Needs: 50-75%  % Meal Intake: 50 - 75 %    Nutrition Risk    Level of Risk/Frequency of Follow-up: low     Assessment and Plan  Malnutrition of mild degree  Nutrition Problem:  (Moderate) Protein-Calorie Malnutrition  Malnutrition in the context of Chronic Illness/Injury     Related to (etiology):  Inadequate oral intake     Signs and Symptoms (as evidenced by):  Energy Intake: <75% of estimated energy requirement for >/= 1 month  Body Fat Depletion: mild depletion of orbitals   Muscle Mass Depletion: mild depletion of temples   Weight Loss: 12.5%% x 3 months     Interventions(treatment strategy):  Sodium modified diet  Collaboration with other providers  Commercial " food     Nutrition Diagnosis Status:  Improving     Nutrition Problem  Inadequate oral intake    Related to (etiology):   Food preferences    Signs and Symptoms (as evidenced by):   Pt. Reports intake of 50-75% ENN secondary to dislike of vegetables and stomach discomfort with commercial foods.    Interventions (treatment strategy):  Commercial beverage    Nutrition Diagnosis Status:   New    Monitor and Evaluation    Food and Nutrient Intake: energy intake, food and beverage intake  Food and Nutrient Adminstration: diet order  Physical Activity and Function: nutrition-related ADLs and IADLs  Anthropometric Measurements: weight, weight change, body mass index  Biochemical Data, Medical Tests and Procedures: electrolyte and renal panel, gastrointestinal profile, glucose/endocrine profile, inflammatory profile, lipid profile  Nutrition-Focused Physical Findings: overall appearance     Malnutrition Assessment  Malnutrition Type: chronic illness          Weight Loss (Malnutrition): greater than 7.5% in 3 months  Energy Intake (Malnutrition): less than 75% for greater than or equal to 1 month   Orbital Region (Subcutaneous Fat Loss): mild depletion  Upper Arm Region (Subcutaneous Fat Loss): well nourished  Thoracic and Lumbar Region: well nourished   Yazidi Region (Muscle Loss): mild depletion  Clavicle Bone Region (Muscle Loss): well nourished  Clavicle and Acromion Bone Region (Muscle Loss): well nourished  Scapular Bone Region (Muscle Loss): well nourished  Dorsal Hand (Muscle Loss): well nourished  Patellar Region (Muscle Loss): well nourished  Anterior Thigh Region (Muscle Loss): well nourished  Posterior Calf Region (Muscle Loss): well nourished         Nutrition Follow-Up    RD Follow-up?: Yes

## 2020-11-04 LAB — POCT GLUCOSE: 109 MG/DL (ref 70–110)

## 2020-11-04 PROCEDURE — 11000004 HC SNF PRIVATE

## 2020-11-04 PROCEDURE — 97535 SELF CARE MNGMENT TRAINING: CPT

## 2020-11-04 PROCEDURE — 97530 THERAPEUTIC ACTIVITIES: CPT

## 2020-11-04 PROCEDURE — 94640 AIRWAY INHALATION TREATMENT: CPT

## 2020-11-04 PROCEDURE — 99309 PR NURSING FAC CARE, SUBSEQ, SIGNIF COMPLIC: ICD-10-PCS | Mod: ,,, | Performed by: FAMILY MEDICINE

## 2020-11-04 PROCEDURE — 25000242 PHARM REV CODE 250 ALT 637 W/ HCPCS: Performed by: NURSE PRACTITIONER

## 2020-11-04 PROCEDURE — 27000221 HC OXYGEN, UP TO 24 HOURS

## 2020-11-04 PROCEDURE — 25000003 PHARM REV CODE 250: Performed by: NURSE PRACTITIONER

## 2020-11-04 PROCEDURE — 94799 UNLISTED PULMONARY SVC/PX: CPT

## 2020-11-04 PROCEDURE — 94761 N-INVAS EAR/PLS OXIMETRY MLT: CPT

## 2020-11-04 PROCEDURE — 63600175 PHARM REV CODE 636 W HCPCS: Performed by: FAMILY MEDICINE

## 2020-11-04 PROCEDURE — A4216 STERILE WATER/SALINE, 10 ML: HCPCS | Performed by: INTERNAL MEDICINE

## 2020-11-04 PROCEDURE — 25000003 PHARM REV CODE 250: Performed by: INTERNAL MEDICINE

## 2020-11-04 PROCEDURE — 99309 SBSQ NF CARE MODERATE MDM 30: CPT | Mod: ,,, | Performed by: FAMILY MEDICINE

## 2020-11-04 PROCEDURE — 25000003 PHARM REV CODE 250: Performed by: FAMILY MEDICINE

## 2020-11-04 RX ADMIN — METHOCARBAMOL TABLETS 500 MG: 500 TABLET, COATED ORAL at 09:11

## 2020-11-04 RX ADMIN — FUROSEMIDE 40 MG: 40 TABLET ORAL at 09:11

## 2020-11-04 RX ADMIN — DULOXETINE 60 MG: 30 CAPSULE, DELAYED RELEASE ORAL at 09:11

## 2020-11-04 RX ADMIN — GUAIFENESIN 600 MG: 600 TABLET, EXTENDED RELEASE ORAL at 09:11

## 2020-11-04 RX ADMIN — ASPIRIN 81 MG: 81 TABLET, COATED ORAL at 09:11

## 2020-11-04 RX ADMIN — CLOPIDOGREL 75 MG: 75 TABLET, FILM COATED ORAL at 09:11

## 2020-11-04 RX ADMIN — PANTOPRAZOLE SODIUM 40 MG: 40 TABLET, DELAYED RELEASE ORAL at 06:11

## 2020-11-04 RX ADMIN — VANCOMYCIN HYDROCHLORIDE 1000 MG: 1 INJECTION, POWDER, LYOPHILIZED, FOR SOLUTION INTRAVENOUS at 11:11

## 2020-11-04 RX ADMIN — HYDROCODONE BITARTRATE AND ACETAMINOPHEN 1 TABLET: 10; 325 TABLET ORAL at 02:11

## 2020-11-04 RX ADMIN — MICONAZOLE NITRATE: 20 OINTMENT TOPICAL at 09:11

## 2020-11-04 RX ADMIN — METHOCARBAMOL TABLETS 500 MG: 500 TABLET, COATED ORAL at 01:11

## 2020-11-04 RX ADMIN — PRAVASTATIN SODIUM 40 MG: 40 TABLET ORAL at 09:11

## 2020-11-04 RX ADMIN — Medication 10 ML: at 12:11

## 2020-11-04 RX ADMIN — HYDROCODONE BITARTRATE AND ACETAMINOPHEN 1 TABLET: 10; 325 TABLET ORAL at 09:11

## 2020-11-04 RX ADMIN — MEMANTINE 10 MG: 10 TABLET ORAL at 09:11

## 2020-11-04 RX ADMIN — ACETAMINOPHEN 500 MG: 500 TABLET, FILM COATED ORAL at 09:11

## 2020-11-04 RX ADMIN — Medication 10 ML: at 06:11

## 2020-11-04 RX ADMIN — LISINOPRIL 10 MG: 10 TABLET ORAL at 09:11

## 2020-11-04 RX ADMIN — ISOSORBIDE MONONITRATE 60 MG: 60 TABLET, EXTENDED RELEASE ORAL at 09:11

## 2020-11-04 RX ADMIN — DONEPEZIL HYDROCHLORIDE 10 MG: 5 TABLET, FILM COATED ORAL at 09:11

## 2020-11-04 RX ADMIN — IPRATROPIUM BROMIDE AND ALBUTEROL SULFATE 3 ML: .5; 3 SOLUTION RESPIRATORY (INHALATION) at 01:11

## 2020-11-04 RX ADMIN — METOPROLOL SUCCINATE 50 MG: 50 TABLET, EXTENDED RELEASE ORAL at 09:11

## 2020-11-04 RX ADMIN — IPRATROPIUM BROMIDE AND ALBUTEROL SULFATE 3 ML: .5; 3 SOLUTION RESPIRATORY (INHALATION) at 06:11

## 2020-11-04 RX ADMIN — IPRATROPIUM BROMIDE AND ALBUTEROL SULFATE 3 ML: .5; 3 SOLUTION RESPIRATORY (INHALATION) at 07:11

## 2020-11-04 RX ADMIN — FERROUS SULFATE TAB 325 MG (65 MG ELEMENTAL FE) 325 MG: 325 (65 FE) TAB at 09:11

## 2020-11-04 RX ADMIN — METHOCARBAMOL TABLETS 500 MG: 500 TABLET, COATED ORAL at 05:11

## 2020-11-04 RX ADMIN — Medication 10 ML: at 11:11

## 2020-11-04 NOTE — SUBJECTIVE & OBJECTIVE
Review of Systems   Constitutional: Negative for activity change, fatigue, fever and unexpected weight change.   HENT: Negative for congestion, ear pain, hearing loss, rhinorrhea and sore throat.    Eyes: Negative for redness and visual disturbance.   Respiratory: Negative for cough, shortness of breath and wheezing.    Cardiovascular: Negative for chest pain, palpitations and leg swelling.   Gastrointestinal: Negative for abdominal pain, constipation, diarrhea (watery, non-bloody), nausea and vomiting.   Genitourinary: Negative for dysuria, frequency and urgency.   Musculoskeletal: Positive for back pain. Negative for joint swelling and neck pain.   Skin: Negative for color change, rash and wound.   Neurological: Negative for dizziness, tremors, weakness, light-headedness and headaches.   Psychiatric/Behavioral: Negative for confusion and decreased concentration.     Objective:     Vital Signs (Most Recent):  Temp: 97.9 °F (36.6 °C) (11/04/20 0819)  Pulse: 80 (11/04/20 0819)  Resp: 18 (11/04/20 0819)  BP: (!) 164/70 (11/04/20 0819)  SpO2: (!) 94 % (11/04/20 0819) Vital Signs (24h Range):  Temp:  [97.4 °F (36.3 °C)-97.9 °F (36.6 °C)] 97.9 °F (36.6 °C)  Pulse:  [67-80] 80  Resp:  [16-20] 18  SpO2:  [91 %-95 %] 94 %  BP: (115-164)/(57-70) 164/70     Weight: 134.1 kg (295 lb 10.2 oz)  Body mass index is 42.42 kg/m².    Physical Exam  Vitals signs and nursing note reviewed.   Constitutional:       General: She is not in acute distress.     Appearance: She is well-developed. She is obese.   HENT:      Head: Normocephalic and atraumatic.      Right Ear: External ear normal.      Left Ear: External ear normal.      Nose: Nose normal.      Mouth/Throat:      Mouth: Mucous membranes are moist.      Pharynx: Oropharynx is clear.   Eyes:      General:         Right eye: No discharge.         Left eye: No discharge.      Extraocular Movements: Extraocular movements intact.      Conjunctiva/sclera: Conjunctivae normal.       Pupils: Pupils are equal, round, and reactive to light.   Neck:      Musculoskeletal: Neck supple.      Thyroid: No thyromegaly.   Cardiovascular:      Rate and Rhythm: Normal rate and regular rhythm.      Heart sounds: No murmur.   Pulmonary:      Effort: Pulmonary effort is normal. No respiratory distress.      Breath sounds: No wheezing, rhonchi or rales.   Abdominal:      General: Bowel sounds are normal. There is no distension.      Palpations: Abdomen is soft.      Tenderness: There is no abdominal tenderness.   Musculoskeletal:      Right lower leg: No edema.      Left lower leg: No edema.   Lymphadenopathy:      Cervical: No cervical adenopathy.   Skin:     General: Skin is warm and dry.   Neurological:      General: No focal deficit present.      Mental Status: She is alert.      Cranial Nerves: No cranial nerve deficit.      Comments: Obese, not very cooperative with PT.  Max assistance   Psychiatric:         Behavior: Behavior normal.           CRANIAL NERVES     CN III, IV, VI   Pupils are equal, round, and reactive to light.       Significant Labs:   CBC:   No results for input(s): WBC, HGB, HCT, PLT in the last 48 hours.  [facility]  Lab Results   Component Value Date     11/02/2020    K 3.7 11/02/2020     11/02/2020    CO2 30 (H) 11/02/2020    BUN 10 11/02/2020    CREATININE 0.9 11/02/2020    CALCIUM 10.0 11/02/2020    ANIONGAP 10 11/02/2020    ESTGFRAFRICA >60 11/02/2020    EGFRNONAA >60 11/02/2020     10/23 vanc trough 20.5    Body Fluid Type  Other (Specify)    Comment: T9-T10   Fluid Appearance  Clear    Fluid Color  Icteric    WBC, Body Fluid /cu mm 10    Comment: Reference ranges for body fluids not established.   Correlate clinically.    Segs, Fluid % 65    Lymphs, Fluid % 23    Monocytes/Macrophages, Fluid % 12    Aerobic culture no growth   Fungal culture in process  10/8 blood culture NGTD x 5    9/23 METHICILLIN RESISTANT STAPHYLOCOCCUS AUREUS  Clindamycin <=0.5 mcg/mL Sensitive       Erythromycin <=0.5 mcg/mL Sensitive     Oxacillin >2 mcg/mL Resistant     Penicillin 8 mcg/mL Resistant     Tetracycline <=4 mcg/mL Sensitive     Trimeth/Sulfa <=0.5/9.5 m... Sensitive     Vancomycin 2 mcg/mL Sensitive      Significant Imaging:     10/19 CXR There is cardiomegaly.  Lungs are clear.  Central line tip upper SVC    10/13 CT lumbar   Findings above consistent with T9-T10 discitis/osteomyelitis with multilocular paravertebral abscesses, better evaluated on MRI 10/12/2020.  No significant retropulsion osseous fragments into the canal.     Multilevel lumbar spondylosis resulting in moderate to severe spinal canal stenosis from L2-L3 through L4-L5 and severe bilateral neural foraminal narrowing at L5-S1.     Mild multilevel thoracic spondylosis, detailed above.     Bilateral pleural effusions, larger on the left with compressive atelectasis/volume loss of adjacent lung parenchyma.     Moderate-large size hiatal hernia     Multi-vessel coronary artery atherosclerosis.     10/13 CT thoracic spine   Findings above consistent with T9-T10 discitis/osteomyelitis with multilocular paravertebral abscesses, better evaluated on MRI 10/12/2020.  No significant retropulsion osseous fragments into the canal.     Multilevel lumbar spondylosis resulting in moderate to severe spinal canal stenosis from L2-L3 through L4-L5 and severe bilateral neural foraminal narrowing at L5-S1.     Mild multilevel thoracic spondylosis, detailed above.     Bilateral pleural effusions, larger on the left with compressive atelectasis/volume loss of adjacent lung parenchyma.     Moderate-large size hiatal hernia     Multi-vessel coronary artery atherosclerosis.    10/12 MRI thoracic Discitis/osteomyelitis at the T9-T10 level with multilocular paravertebral abscesses similar to prior exam.     T8-T10 epidural phlegmon results in moderate narrowing of the thecal sac. There is associated nerve root enhancement, which may be seen with  arachnoiditis.     Bilateral complex pleural effusions with enhancement.  Empyema is a consideration.  Consider further evaluation with contrast enhanced chest CT.     Multilevel mild degenerative changes and disc bulges in the thoracic spine, most prominent at T5-T6.    10/12 MRI lumbar No evidence for spondylodiscitis.     Bilateral L5 spondylolysis with grade 2 anterolisthesis.     Multilevel degenerative changes of the lumbar spine detailed above.  Moderate to severe spinal canal stenosis noted at L2-L5.  Severe neural foraminal narrowing noted at L5-S1.    9/23 EKG Normal sinus rhythm  Nonspecific ST and T wave abnormality  Abnormal ECG  When compared with ECG of 10-AUG-2020 06:42,  Nonspecific T wave abnormality now evident in Lateral leads  Confirmed by Willie Matthews MD ([id number]) on 9/23/2020 3:05:10 PM

## 2020-11-04 NOTE — PLAN OF CARE
Patient admitted as SWING, Assessment complete per flow sheet, AAOX4, RR even unlabored BBS diminished, on 3 L NC. Abdomen soft, non distended, with active bowel sounds x 4 quads.Tolerating diet well. Dressing to back C/D/I. PICC to RT arm SL, Dsg C/D/I.  Medications administered per MAR, tolerated well. HERMELINDA heels elevated off bed with pillows, daily betadine applied to pressure ulcer. safety precautions maintained, bed alarm on, free from falls/ injury, call bell in reach, instructed to call for needs, voiced understanding, agrees with plan of care.  Worked with PT, see notes.

## 2020-11-04 NOTE — PLAN OF CARE
Patient Agrees and Compliant with Plan of Care; Swing Patient.  Monitor Vital Signs; Vital signs stable thus far this shift.  Monitor Breathing Pattern; Bilateral Breath sounds auscultated posteriorly with fine crackles LLL. Oxygen saturation 91-93% on 3L nc. No c/o shortness of breath.  Maintain Pain Regimen; Hydrocodone given X 1 this shift for c/o lower back pain. With relief.  Maintain Skin Integrity; Folds to abdomen and area under Breast improving. Areas cleaned and dried well and antifungal barrier to area. Note perineal and upper thigh area red. This area cleaned and Dried well and Antifungal barrier to area.  Assisting Patient with repositioning ..Patient has Purrwick external cath draining clear yellow urine.  Administer IV Antibiotic as ordered; Vancomycin 1gram IVPB given tonight.  Monitor Vancomycin Troughs as ordered; 11/5/20 at 2330.  Patient working with PT ; Patient stronger and assisting with repositioning .Did stand for 2.7sec. yesterday with PT.   Fall Precautions; Maintain Patient Safety; No Falls or injury noted this shift.

## 2020-11-04 NOTE — PT/OT/SLP PROGRESS
Occupational Therapy   Treatment    Name: Martina Betancourt  MRN: [id number]  Admitting Diagnosis:  Debility       Recommendations:     Discharge Recommendations: nursing facility, basic  Discharge Equipment Recommendations:  lift device  Barriers to discharge:       Assessment:     Martina Bteancourt is a 70 y.o. female with a medical diagnosis of Debility.  She presents with improvement with bed mobility and scooting this date but progress still impacted by severe pain and poor endurance. Performance deficits affecting function are weakness, impaired endurance, impaired cognition, decreased ROM, decreased coordination, impaired coordination, impaired self care skills, decreased upper extremity function, impaired functional mobilty, decreased lower extremity function, gait instability, decreased safety awareness, impaired balance, pain, impaired cardiopulmonary response to activity.     Rehab Prognosis:  Fair; patient would benefit from acute skilled OT services to address these deficits and reach maximum level of function.       Plan:     Patient to be seen 5 x/week to address the above listed problems via self-care/home management, therapeutic activities, therapeutic exercises  · Plan of Care Expires: 11/11/20  · Plan of Care Reviewed with: patient    Subjective     Pain/Comfort:  · Pain Rating 1: 8/10  · Location - Side 1: Right  · Location - Orientation 1: lower  · Location 1: back  · Pain Addressed 1: Reposition, Cessation of Activity  · Pain Rating Post-Intervention 1: 6/10    Objective:     Communicated with: Nursing prior to session.  Patient found supine with peripheral IV, PureWick, oxygen upon OT entry to room.    General Precautions: Standard, fall   Orthopedic Precautions:Full weight bearing   Braces: N/A     Occupational Performance:     Bed Mobility:    · Patient completed Rolling/Turning to Left with  supervision  · Patient completed Rolling/Turning to Right with supervision  · Patient completed  Scooting/Bridging with stand by assistance  · Patient completed Supine to Sit with stand by assistance  · Patient completed Sit to Supine with moderate assistance     Functional Mobility/Transfers:  · Unable to complete    Activities of Daily Living:  · Feeding:  supervision  mustapha setup  · Grooming: supervision with setup  · Lower Body Dressing: dependence to don socks bed level      [facility] 6 Click ADL: 12    Treatment & Education:  Therapist facilitated scooting task and education on body mechanics to lift bottom with head forward, patient with good participation and scooting towards HOB with SBA, reaching task to promote forward weight shift completed as well with SBA with frequent verbal cues. Therapist facilitated sitting balance EOB with Supervision x 5 minutes with no noted LOB. Patient also demonstrated good understanding of UE exercise program    Patient left supine with all lines intact, call button in reach and nursing notifiedEducation:      GOALS:   Multidisciplinary Problems     Occupational Therapy Goals        Problem: Occupational Therapy Goal    Goal Priority Disciplines Outcome Interventions   Occupational Therapy Goal     OT, PT/OT Ongoing, Progressing    Description: Goals to be met by: 11/11/2020     Patient will increase functional independence with ADLs by performing:    Feeding with Supervision. (GOAL MET)  UE Dressing with Supervision.  LE Dressing with Moderate Assistance.  Grooming while seated with Supervision (GOAL MET).  Toileting from bedside commode with Minimal Assistance for hygiene and clothing management.   Sitting at edge of bed x5 minutes with Supervision. (GOAL MET)  Rolling to Bilateral with Supervision. (GOAL MET)  Supine to sit with Minimal Assistance. (GOAL MET)  Squat pivot transfers with Minimal Assistance.  Toilet transfer to bedside commode with Minimal Assistance.  Upper extremity exercise program x10 reps per handout, with assistance as needed. (GOAL MET)                      Time Tracking:     OT Date of Treatment: 11/04/20  OT Start Time: 1015  OT Stop Time: 1038  OT Total Time (min): 23 min    Billable Minutes:Self Care/Home Management 15 minutes  Therapeutic Activity 8 minutes    Golden Bartlett OT  11/4/2020

## 2020-11-04 NOTE — PROGRESS NOTES
[facility] Medicine  Progress Note    Patient Name: Martina Betancourt  MRN: [id number]  Patient Class: IP- Swing   Admission Date: 10/20/2020  Length of Stay: 15 days  Attending Physician: Vamsi Manuel MD  Primary Care Provider: Joe Fuller Iii, MD        Subjective:     Principal Problem:Debility        HPI:  69yo female patient with hx of alzheiners, CAD, HLD, HTN, myopathy, MI, obesity, sleep apnea and OA (knees non ambulatory uses gonzalez round). She was living at home with her daughter. Recently admitted to [facility] with MRSA bacteremia and subsequent pneumonia treated with Zyvox x 7 days with clearance of her bacteremia now admitted with back pain found to have T9-10 osteo discitis, T8-10 epidural phlegmon with associated paraverterbral abscesses. Spine infection likely hematogenous from under treated bacteremia. Neurosurgery has been consulted. She has been on Vancomycin and Ceftriaxone. Underwent T9-10 disc space biopsy with IR on 10/16. Cultures negative, though had been on IV antibiotics multiple days prior. Afebrile. HDS. Repeat blood cx sterile. ID rec cont vanc 1750mg q 24 till 12/3.     Overview/Hospital Course:  10/23 Here for skilled ; needing IV abx for spinal abscess;  vanc 1750mg q 24 till 12/3  Afebrile , 3LNC - O2 sat 95%   + debility, very deconditioned; bilt LE x 10 reps followed by bed mobility trng and static sit balance and tolerance at side of the bed  C/o back pain with activity; Occupational therapist notes that she is very resistant to movement and c/o severe pain with minimal activity.   Will order toradol 15mg x 1dose IV; pt did much better after toradol rx today per OT. Will order daily prior to OT as tolerated  Monitor renal fx     10/26 here for skilled therapy and cont IV abc. Vanc 1750mg iv every 12hr. Noted elevated WBC this am and she report that she has had diarrhea for the last 4 days.   · PT reports Supine to Sit: maximal assistance, of 2  persons and patient able to reach with UE to push through rail and with improved push off with UE and initiation  · Sit to Supine: maximal assistance of 2 people with improved ability of patient to control descent of upper body  · Transfers:     · Sit to Stand:  moderate assistance, maximal assistance of 2 persons with no AD.  PT and PT tech blocking both knees while assisting pt to elevate hips from bed  Balance: pt able to stand x15 seconds EOB limited largely by knee and back pain.       10/28  She is still on vanc IV daily. No longer with diarrhea. Did have heme positive stools H/H stable on lovenox for DVT prophylaxis and plavix. Will monitor h/h M/Thurs. No fever. No elevated WBC  pt is really immobile.  · Bed Mobility:     · Rolling Left:  moderate assistance  · Rolling Right: moderate assistance  · Scooting: maximal assistance  · Supine to Sit: maximum/moderate assistace and cues  · Sit to Supine: maximum assistace x 1-2 and cues  · Transfers:     · Sit to Stand:  maximum assistance and cues inside the parallel bars with facilitation tech  · Bed to Chair: to wheelchair  with  maximum assistance and cues  using  Slide Board  · Balance: Standing Static inside the parallel bars with Poor grade. Tolerated for ~10 seconds with 2 attempts with  Maximum assistance and cues.  Cont PT daily  10/30  IV  vanc 1,000mg q 24hr x 8 weeks total till 12/3 per ID for discitis; random vanc 12.5 yesterday    No longer with diarrhea. Did have heme positive stools H/H stable on lovenox for DVT prophylaxis and plavix. H&H stable, lovenox stopped. No fever. No elevated WBC. She has productive cough this am. K+ 3.2 yesterday, getting lasix, will repeat KCL 40meq today   pt is really immobile.Standing with RW Static: Poor for ~15 seconds tolerance with max Assistance and cues  Has PICC line- one port clotted;       11/2 she remains on vanc 1000mg  q 24hr x 8 weeks total till 12/3 per ID for discitis. Vanc trough 15.8 10/31/20.  Vss/afebrile. intermittent back pain with prn pain meds helping. She is not doing much with PT. She uses Proteopure round at home but can transfer independently. Here she is max assist     11/4 Remains on vanc 1000mg  q 24hr x 8 weeks total till 12/3 per ID for discitis. Vanc trough 16.6 on 11/2   Afebrile , having regular BMs; getting Norco 10mg q 6   · Continues to require max assist; Sit to Stand:  Max/moderate assistance and cues with standard walker  Balance: Static Stand with Std Walker: Fair- with 2 mins and 7 seconds  tolerance and max/moderate assistance and cues      Review of Systems   Constitutional: Negative for activity change, fatigue, fever and unexpected weight change.   HENT: Negative for congestion, ear pain, hearing loss, rhinorrhea and sore throat.    Eyes: Negative for redness and visual disturbance.   Respiratory: Negative for cough, shortness of breath and wheezing.    Cardiovascular: Negative for chest pain, palpitations and leg swelling.   Gastrointestinal: Negative for abdominal pain, constipation, diarrhea (watery, non-bloody), nausea and vomiting.   Genitourinary: Negative for dysuria, frequency and urgency.   Musculoskeletal: Positive for back pain. Negative for joint swelling and neck pain.   Skin: Negative for color change, rash and wound.   Neurological: Negative for dizziness, tremors, weakness, light-headedness and headaches.   Psychiatric/Behavioral: Negative for confusion and decreased concentration.     Objective:     Vital Signs (Most Recent):  Temp: 97.9 °F (36.6 °C) (11/04/20 0819)  Pulse: 80 (11/04/20 0819)  Resp: 18 (11/04/20 0819)  BP: (!) 164/70 (11/04/20 0819)  SpO2: (!) 94 % (11/04/20 0819) Vital Signs (24h Range):  Temp:  [97.4 °F (36.3 °C)-97.9 °F (36.6 °C)] 97.9 °F (36.6 °C)  Pulse:  [67-80] 80  Resp:  [16-20] 18  SpO2:  [91 %-95 %] 94 %  BP: (115-164)/(57-70) 164/70     Weight: 134.1 kg (295 lb 10.2 oz)  Body mass index is 42.42 kg/m².    Physical Exam  Vitals signs and  nursing note reviewed.   Constitutional:       General: She is not in acute distress.     Appearance: She is well-developed. She is obese.   HENT:      Head: Normocephalic and atraumatic.      Right Ear: External ear normal.      Left Ear: External ear normal.      Nose: Nose normal.      Mouth/Throat:      Mouth: Mucous membranes are moist.      Pharynx: Oropharynx is clear.   Eyes:      General:         Right eye: No discharge.         Left eye: No discharge.      Extraocular Movements: Extraocular movements intact.      Conjunctiva/sclera: Conjunctivae normal.      Pupils: Pupils are equal, round, and reactive to light.   Neck:      Musculoskeletal: Neck supple.      Thyroid: No thyromegaly.   Cardiovascular:      Rate and Rhythm: Normal rate and regular rhythm.      Heart sounds: No murmur.   Pulmonary:      Effort: Pulmonary effort is normal. No respiratory distress.      Breath sounds: No wheezing, rhonchi or rales.   Abdominal:      General: Bowel sounds are normal. There is no distension.      Palpations: Abdomen is soft.      Tenderness: There is no abdominal tenderness.   Musculoskeletal:      Right lower leg: No edema.      Left lower leg: No edema.   Lymphadenopathy:      Cervical: No cervical adenopathy.   Skin:     General: Skin is warm and dry.   Neurological:      General: No focal deficit present.      Mental Status: She is alert.      Cranial Nerves: No cranial nerve deficit.      Comments: Obese, not very cooperative with PT.  Max assistance   Psychiatric:         Behavior: Behavior normal.           CRANIAL NERVES     CN III, IV, VI   Pupils are equal, round, and reactive to light.       Significant Labs:   CBC:   No results for input(s): WBC, HGB, HCT, PLT in the last 48 hours.  BMP  Lab Results   Component Value Date     11/02/2020    K 3.7 11/02/2020     11/02/2020    CO2 30 (H) 11/02/2020    BUN 10 11/02/2020    CREATININE 0.9 11/02/2020    CALCIUM 10.0 11/02/2020    ANIONGAP 10  11/02/2020    ESTGFRAFRICA >60 11/02/2020    EGFRNONAA >60 11/02/2020     10/23 vanc trough 20.5    Body Fluid Type  Other (Specify)    Comment: T9-T10   Fluid Appearance  Clear    Fluid Color  Icteric    WBC, Body Fluid /cu mm 10    Comment: Reference ranges for body fluids not established.   Correlate clinically.    Segs, Fluid % 65    Lymphs, Fluid % 23    Monocytes/Macrophages, Fluid % 12    Aerobic culture no growth   Fungal culture in process  10/8 blood culture NGTD x 5    9/23 METHICILLIN RESISTANT STAPHYLOCOCCUS AUREUS  Clindamycin <=0.5 mcg/mL Sensitive      Erythromycin <=0.5 mcg/mL Sensitive     Oxacillin >2 mcg/mL Resistant     Penicillin 8 mcg/mL Resistant     Tetracycline <=4 mcg/mL Sensitive     Trimeth/Sulfa <=0.5/9.5 m... Sensitive     Vancomycin 2 mcg/mL Sensitive      Significant Imaging:     10/19 CXR There is cardiomegaly.  Lungs are clear.  Central line tip upper SVC    10/13 CT lumbar   Findings above consistent with T9-T10 discitis/osteomyelitis with multilocular paravertebral abscesses, better evaluated on MRI 10/12/2020.  No significant retropulsion osseous fragments into the canal.     Multilevel lumbar spondylosis resulting in moderate to severe spinal canal stenosis from L2-L3 through L4-L5 and severe bilateral neural foraminal narrowing at L5-S1.     Mild multilevel thoracic spondylosis, detailed above.     Bilateral pleural effusions, larger on the left with compressive atelectasis/volume loss of adjacent lung parenchyma.     Moderate-large size hiatal hernia     Multi-vessel coronary artery atherosclerosis.     10/13 CT thoracic spine   Findings above consistent with T9-T10 discitis/osteomyelitis with multilocular paravertebral abscesses, better evaluated on MRI 10/12/2020.  No significant retropulsion osseous fragments into the canal.     Multilevel lumbar spondylosis resulting in moderate to severe spinal canal stenosis from L2-L3 through L4-L5 and severe bilateral neural foraminal  narrowing at L5-S1.     Mild multilevel thoracic spondylosis, detailed above.     Bilateral pleural effusions, larger on the left with compressive atelectasis/volume loss of adjacent lung parenchyma.     Moderate-large size hiatal hernia     Multi-vessel coronary artery atherosclerosis.    10/12 MRI thoracic Discitis/osteomyelitis at the T9-T10 level with multilocular paravertebral abscesses similar to prior exam.     T8-T10 epidural phlegmon results in moderate narrowing of the thecal sac. There is associated nerve root enhancement, which may be seen with arachnoiditis.     Bilateral complex pleural effusions with enhancement.  Empyema is a consideration.  Consider further evaluation with contrast enhanced chest CT.     Multilevel mild degenerative changes and disc bulges in the thoracic spine, most prominent at T5-T6.    10/12 MRI lumbar No evidence for spondylodiscitis.     Bilateral L5 spondylolysis with grade 2 anterolisthesis.     Multilevel degenerative changes of the lumbar spine detailed above.  Moderate to severe spinal canal stenosis noted at L2-L5.  Severe neural foraminal narrowing noted at L5-S1.    9/23 EKG Normal sinus rhythm  Nonspecific ST and T wave abnormality  Abnormal ECG  When compared with ECG of 10-AUG-2020 06:42,  Nonspecific T wave abnormality now evident in Lateral leads  Confirmed by Willie Matthews MD ([id number]) on 9/23/2020 3:05:10 PM      Assessment/Plan:      * Debility  Was walking with walker prior to pneumonia/bacteremia admit last month then after d.c was only w/c bound (gonzalez round) will add pt here and try and get her as strong as poss as she lives at home with family.   10/23 Chronic co back ache but also has recent high ankle fx ; per EDDIE Vinson NP Spoke with Ariela VASQUES who will see patient while on SWING. She looked over x rays and hx and reports that patient can weight bear as tolerated. Nurse/OT/MD notified    · 10/26 Supine to Sit: maximal assistance, of 2 persons and  "patient able to reach with UE to push through rail and with improved push off with UE and initiation  · Sit to Supine: maximal assistance of 2 people with improved ability of patient to control descent of upper body  · Transfers:     · Sit to Stand:  moderate assistance, maximal assistance of 2 persons with no AD.  PT and PT tech blocking both knees while assisting pt to elevate hips from bed  Balance: pt able to stand x15 seconds EOB limited largely by knee and back pain.     10/28  · Bed Mobility:     · Rolling Left:  moderate assistance  · Rolling Right: moderate assistance  · Scooting: maximal assistance  · Supine to Sit: maximum/moderate assistace and cues  · Sit to Supine: maximum assistace x 1-2 and cues  · Transfers:     · Sit to Stand:  maximum assistance and cues inside the parallel bars with facilitation tech  · Bed to Chair: to wheelchair  with  maximum assistance and cues  using  Slide Board  · Balance: Standing Static inside the parallel bars with Poor grade. Tolerated for ~10 seconds with 2 attempts with  Maximum assistance and cues.  10/30 Standing with RW Static: Poor for ~15 seconds tolerance with max Assistance and cues  11/2  wheelchair maximum assistance x 2 and cues  with  slide board  using  slide/scoot tech  11/4 Sit to Stand:  Max/moderate assistance and cues with standard walker  Balance: Static Stand with Std Walker: Fair- with 2 mins and 7 seconds  tolerance and max/moderate assistance and cues    Cough productive of purulent sputum  Productive cough this am per nurse- " pale green, grey"   Add mucinex and give neb tx q 6 while awake  No fever, WBC normal   Increase activity         Diarrhea  Stools d/c as diarrhea has improved. + heme occult     Malnutrition of mild degree  Dietary  boost      Hydronephrosis  Cont asa, plavix, lasix, isosorbide, lisinopril, toprol and statin      CAD (coronary artery disease)  Cont asa, plavix, lasix, isosorbide, lisinopril, toprol and statin  Time to " stop Lovenox      HTN (hypertension)  Increase lisinopril 2.5>10mg daily  10/23 SBP 160s at times; lisinopril just increased will give more time for lisinopril to work before titration  10/26 SBP 140s; cont to monitor  10/28 /80- increase lisinopril  10/30 BP much better 119/59- 138/62    11/2 /58    Discitis  vanc 1450mg IV every 24hr x 8 weeks total till 12/3 per ID ; will stay here for the duration   PT  vanc now 1000mg IV every 24hr till 12/3 per ID last vanc trough 10/31 15.8    Reach out to ns today for guidance on re-imaging    Severe obesity (BMI >= 40)  Using boost as she has low appetite and low protein       Obstructive sleep apnea treated with continuous positive airway pressure (CPAP)  Bring home cpap      Dementia without behavioral disturbance  Cont namenda and aricept         VTE Risk Mitigation (From admission, onward)    None          Discharge Planning   BRIT: 12/3/2020     Code Status: DNR   Is the patient medically ready for discharge?:     Reason for patient still in hospital (select all that apply): Treatment  Discharge Plan A: Home with family, Home Health                  Orestes Rehman MD  Department of Hospital Medicine   [facility]

## 2020-11-04 NOTE — PT/OT/SLP PROGRESS
"Physical Therapy Treatment    Patient Name:  Martina Betancourt   MRN:  [id number]    Recommendations:     Discharge Recommendations:  nursing facility, basic   Discharge Equipment Recommendations: lift device   Barriers to discharge: Decreased caregiver support    Assessment:     Martina Betancourt is a 70 y.o. female admitted with a medical diagnosis of Debility.  She presents with the following impairments/functional limitations:  weakness, impaired endurance, impaired cognition, decreased ROM, pain, gait instability, impaired functional mobilty, impaired self care skills, decreased lower extremity function, impaired balance, impaired cardiopulmonary response to activity.Patient participated well and gradually increased static standing tolerance using standard walker at bedside to 2 minutes and 30 seconds with maximum/moderate assistance cues.     Rehab Prognosis: Fair; patient would benefit from acute skilled PT services to address these deficits and reach maximum level of function.    Recent Surgery: * No surgery found *      Plan:     During this hospitalization, patient to be seen daily to address the identified rehab impairments via gait training, therapeutic activities, therapeutic exercises and progress toward the following goals:    · Plan of Care Expires:  11/10/20    Subjective     Chief Complaint: chronic R Lower back Pain; knees pain upon weight bearing activity.  Patient/Family Comments/goals: "To try my best in Therapy".  Pain/Comfort:  · Pain Rating 1: 8/10  · Location - Side 1: Right  · Location - Orientation 1: lower  · Location 1: back  · Pain Addressed 1: Cessation of Activity, Reposition  · Pain Rating Post-Intervention 1: 7/10  · Pain Rating 2: 7/10  · Location - Side 2: Bilateral  · Location - Orientation 2: lower  · Location 2: knee  · Pain Addressed 2: Cessation of Activity  · Pain Rating Post-Intervention 2: 6/10      Objective:     Communicated with patient  prior to session.  Patient found " supine with PureWick, peripheral IV, oxygen upon PT entry to room.     General Precautions: Standard, fall   Orthopedic Precautions:Full weight bearing   Braces: N/A     Functional Mobility:  · Bed Mobility:     · Rolling Left:  minimal/contact guard assistance and cues with bedrail  · Rolling Right: minimal/contact guard assistance and cues with bedrail  · Scooting: minimal assistance and cues  · Supine to Sit: minimal assistance and cues  · Sit to Supine: maximum/moderate assistance and cues on ascending bilat LE  · Transfers:     · Sit to Stand:  maximum/moderate assistance and cues with standard walker  · Balance: Standing with Std Walker Static: Poor+ for 2 mins and 30 seconds tolerance .      AM-PAC 6 CLICK MOBILITY  Turning over in bed (including adjusting bedclothes, sheets and blankets)?: 3  Sitting down on and standing up from a chair with arms (e.g., wheelchair, bedside commode, etc.): 2  Moving from lying on back to sitting on the side of the bed?: 3  Moving to and from a bed to a chair (including a wheelchair)?: 2  Need to walk in hospital room?: 2  Climbing 3-5 steps with a railing?: 1  Basic Mobility Total Score: 13       Therapeutic Activities and Exercises:   Worked on body sequence/mechanics on bed mobility, sit to stand trasnfer trng and static stand tolerance ex using std walker.    Patient left supine with all lines intact, call button in reach, bed alarm on and nursing notified..    GOALS:   Multidisciplinary Problems     Physical Therapy Goals        Problem: Physical Therapy Goal    Goal Priority Disciplines Outcome Goal Variances Interventions   Physical Therapy Goal     PT, PT/OT Ongoing, Progressing     Description: Goals to be met by: 11/10/20    Patient will increase functional independence with mobility by performin. Rolling to sides using bed rail with contact guard assistance and cues  2. Supine to sit with minimal assistance and cues  3. Sit to supine with moderate assistance  and cues  4. Tolerate Static Sit at side of the bed for 10 minutes with Standby Assistance  5. Bed to chair transfer with moderate assistance and cues using Slide Board TECHNIQUE  6. Lower extremity exercise program x15 reps per handout, with assistance as needed                      Time Tracking:     PT Received On: 11/04/20  PT Start Time: 1320     PT Stop Time: 1350  PT Total Time (min): 30 min     Billable Minutes: Therapeutic Activity 30    Treatment Type: Treatment  PT/PTA: PT     PTA Visit Number: 0     Edgar Lamas, PT  11/04/2020

## 2020-11-04 NOTE — ASSESSMENT & PLAN NOTE
Was walking with walker prior to pneumonia/bacteremia admit last month then after d.c was only w/c bound (gonzalez round) will add pt here and try and get her as strong as poss as she lives at home with family.   10/23 Chronic co back ache but also has recent high ankle fx ; per EDDIE Vinson NP Spoke with Ariela VASQUES who will see patient while on SWING. She looked over x rays and hx and reports that patient can weight bear as tolerated. Nurse/OT/MD notified    · 10/26 Supine to Sit: maximal assistance, of 2 persons and patient able to reach with UE to push through rail and with improved push off with UE and initiation  · Sit to Supine: maximal assistance of 2 people with improved ability of patient to control descent of upper body  · Transfers:     · Sit to Stand:  moderate assistance, maximal assistance of 2 persons with no AD.  PT and PT tech blocking both knees while assisting pt to elevate hips from bed  Balance: pt able to stand x15 seconds EOB limited largely by knee and back pain.     10/28  · Bed Mobility:     · Rolling Left:  moderate assistance  · Rolling Right: moderate assistance  · Scooting: maximal assistance  · Supine to Sit: maximum/moderate assistace and cues  · Sit to Supine: maximum assistace x 1-2 and cues  · Transfers:     · Sit to Stand:  maximum assistance and cues inside the parallel bars with facilitation tech  · Bed to Chair: to wheelchair  with  maximum assistance and cues  using  Slide Board  · Balance: Standing Static inside the parallel bars with Poor grade. Tolerated for ~10 seconds with 2 attempts with  Maximum assistance and cues.  10/30 Standing with RW Static: Poor for ~15 seconds tolerance with max Assistance and cues  11/2  wheelchair maximum assistance x 2 and cues  with  slide board  using  slide/scoot tech  11/4 Sit to Stand:  Max/moderate assistance and cues with standard walker  Balance: Static Stand with Std Walker: Fair- with 2 mins and 7 seconds  tolerance and  max/moderate assistance and cues

## 2020-11-05 LAB
ANION GAP SERPL CALC-SCNC: 11 MMOL/L (ref 8–16)
BASOPHILS # BLD AUTO: 0.05 K/UL (ref 0–0.2)
BASOPHILS NFR BLD: 0.6 % (ref 0–1.9)
BUN SERPL-MCNC: 13 MG/DL (ref 8–23)
CALCIUM SERPL-MCNC: 10 MG/DL (ref 8.7–10.5)
CHLORIDE SERPL-SCNC: 98 MMOL/L (ref 95–110)
CO2 SERPL-SCNC: 30 MMOL/L (ref 23–29)
CREAT SERPL-MCNC: 1.3 MG/DL (ref 0.5–1.4)
DIFFERENTIAL METHOD: ABNORMAL
EOSINOPHIL # BLD AUTO: 0.3 K/UL (ref 0–0.5)
EOSINOPHIL NFR BLD: 3.8 % (ref 0–8)
ERYTHROCYTE [DISTWIDTH] IN BLOOD BY AUTOMATED COUNT: 16.4 % (ref 11.5–14.5)
EST. GFR  (AFRICAN AMERICAN): 47 ML/MIN/1.73 M^2
EST. GFR  (NON AFRICAN AMERICAN): 41 ML/MIN/1.73 M^2
GLUCOSE SERPL-MCNC: 95 MG/DL (ref 70–110)
HCT VFR BLD AUTO: 34.3 % (ref 37–48.5)
HGB BLD-MCNC: 10.1 G/DL (ref 12–16)
IMM GRANULOCYTES # BLD AUTO: 0.04 K/UL (ref 0–0.04)
IMM GRANULOCYTES NFR BLD AUTO: 0.5 % (ref 0–0.5)
LYMPHOCYTES # BLD AUTO: 2.1 K/UL (ref 1–4.8)
LYMPHOCYTES NFR BLD: 26.1 % (ref 18–48)
MAGNESIUM SERPL-MCNC: 1.9 MG/DL (ref 1.6–2.6)
MCH RBC QN AUTO: 26.7 PG (ref 27–31)
MCHC RBC AUTO-ENTMCNC: 29.4 G/DL (ref 32–36)
MCV RBC AUTO: 91 FL (ref 82–98)
MONOCYTES # BLD AUTO: 0.9 K/UL (ref 0.3–1)
MONOCYTES NFR BLD: 11.5 % (ref 4–15)
NEUTROPHILS # BLD AUTO: 4.7 K/UL (ref 1.8–7.7)
NEUTROPHILS NFR BLD: 57.5 % (ref 38–73)
NRBC BLD-RTO: 0 /100 WBC
PHOSPHATE SERPL-MCNC: 4.7 MG/DL (ref 2.7–4.5)
PLATELET # BLD AUTO: 423 K/UL (ref 150–350)
PMV BLD AUTO: 9.3 FL (ref 9.2–12.9)
POTASSIUM SERPL-SCNC: 3.4 MMOL/L (ref 3.5–5.1)
RBC # BLD AUTO: 3.78 M/UL (ref 4–5.4)
SODIUM SERPL-SCNC: 139 MMOL/L (ref 136–145)
VANCOMYCIN TROUGH SERPL-MCNC: 21.1 UG/ML (ref 10–22)
WBC # BLD AUTO: 8.09 K/UL (ref 3.9–12.7)

## 2020-11-05 PROCEDURE — 80202 ASSAY OF VANCOMYCIN: CPT

## 2020-11-05 PROCEDURE — 94761 N-INVAS EAR/PLS OXIMETRY MLT: CPT

## 2020-11-05 PROCEDURE — 97530 THERAPEUTIC ACTIVITIES: CPT

## 2020-11-05 PROCEDURE — 36415 COLL VENOUS BLD VENIPUNCTURE: CPT

## 2020-11-05 PROCEDURE — 25000242 PHARM REV CODE 250 ALT 637 W/ HCPCS: Performed by: NURSE PRACTITIONER

## 2020-11-05 PROCEDURE — 27000221 HC OXYGEN, UP TO 24 HOURS

## 2020-11-05 PROCEDURE — 94799 UNLISTED PULMONARY SVC/PX: CPT

## 2020-11-05 PROCEDURE — A4216 STERILE WATER/SALINE, 10 ML: HCPCS | Performed by: INTERNAL MEDICINE

## 2020-11-05 PROCEDURE — 11000004 HC SNF PRIVATE

## 2020-11-05 PROCEDURE — 80048 BASIC METABOLIC PNL TOTAL CA: CPT | Mod: 91

## 2020-11-05 PROCEDURE — 94640 AIRWAY INHALATION TREATMENT: CPT

## 2020-11-05 PROCEDURE — 25000003 PHARM REV CODE 250: Performed by: NURSE PRACTITIONER

## 2020-11-05 PROCEDURE — 84100 ASSAY OF PHOSPHORUS: CPT

## 2020-11-05 PROCEDURE — 80048 BASIC METABOLIC PNL TOTAL CA: CPT

## 2020-11-05 PROCEDURE — 83735 ASSAY OF MAGNESIUM: CPT

## 2020-11-05 PROCEDURE — 25000003 PHARM REV CODE 250: Performed by: INTERNAL MEDICINE

## 2020-11-05 PROCEDURE — 85025 COMPLETE CBC W/AUTO DIFF WBC: CPT

## 2020-11-05 RX ADMIN — DONEPEZIL HYDROCHLORIDE 10 MG: 5 TABLET, FILM COATED ORAL at 08:11

## 2020-11-05 RX ADMIN — DULOXETINE 60 MG: 30 CAPSULE, DELAYED RELEASE ORAL at 08:11

## 2020-11-05 RX ADMIN — SODIUM CHLORIDE 500 ML: 0.9 SOLUTION INTRAVENOUS at 10:11

## 2020-11-05 RX ADMIN — ASPIRIN 81 MG: 81 TABLET, COATED ORAL at 08:11

## 2020-11-05 RX ADMIN — FERROUS SULFATE TAB 325 MG (65 MG ELEMENTAL FE) 325 MG: 325 (65 FE) TAB at 08:11

## 2020-11-05 RX ADMIN — CALCIUM CARBONATE (ANTACID) CHEW TAB 500 MG 500 MG: 500 CHEW TAB at 10:11

## 2020-11-05 RX ADMIN — MICONAZOLE NITRATE: 20 OINTMENT TOPICAL at 08:11

## 2020-11-05 RX ADMIN — IPRATROPIUM BROMIDE AND ALBUTEROL SULFATE 3 ML: .5; 3 SOLUTION RESPIRATORY (INHALATION) at 07:11

## 2020-11-05 RX ADMIN — CLOPIDOGREL 75 MG: 75 TABLET, FILM COATED ORAL at 08:11

## 2020-11-05 RX ADMIN — MEMANTINE 10 MG: 10 TABLET ORAL at 08:11

## 2020-11-05 RX ADMIN — METHOCARBAMOL TABLETS 500 MG: 500 TABLET, COATED ORAL at 08:11

## 2020-11-05 RX ADMIN — GUAIFENESIN 600 MG: 600 TABLET, EXTENDED RELEASE ORAL at 08:11

## 2020-11-05 RX ADMIN — METHOCARBAMOL TABLETS 500 MG: 500 TABLET, COATED ORAL at 05:11

## 2020-11-05 RX ADMIN — PANTOPRAZOLE SODIUM 40 MG: 40 TABLET, DELAYED RELEASE ORAL at 05:11

## 2020-11-05 RX ADMIN — FUROSEMIDE 40 MG: 40 TABLET ORAL at 08:11

## 2020-11-05 RX ADMIN — HYDROCODONE BITARTRATE AND ACETAMINOPHEN 1 TABLET: 10; 325 TABLET ORAL at 01:11

## 2020-11-05 RX ADMIN — METHOCARBAMOL TABLETS 500 MG: 500 TABLET, COATED ORAL at 01:11

## 2020-11-05 RX ADMIN — Medication 10 ML: at 11:11

## 2020-11-05 RX ADMIN — ONDANSETRON 4 MG: 4 TABLET, ORALLY DISINTEGRATING ORAL at 11:11

## 2020-11-05 RX ADMIN — Medication 10 ML: at 06:11

## 2020-11-05 RX ADMIN — MICONAZOLE NITRATE: 20 OINTMENT TOPICAL at 09:11

## 2020-11-05 RX ADMIN — PRAVASTATIN SODIUM 40 MG: 40 TABLET ORAL at 08:11

## 2020-11-05 RX ADMIN — IPRATROPIUM BROMIDE AND ALBUTEROL SULFATE 3 ML: .5; 3 SOLUTION RESPIRATORY (INHALATION) at 01:11

## 2020-11-05 RX ADMIN — ACETAMINOPHEN 500 MG: 500 TABLET, FILM COATED ORAL at 08:11

## 2020-11-05 RX ADMIN — Medication 10 ML: at 05:11

## 2020-11-05 NOTE — PLAN OF CARE
Patient admitted as SWING, Assessment complete per flow sheet, AAOX4, RR even unlabored BBS diminished, on 3 L NC. Abdomen soft, non distended, with active bowel sounds x 4 quads.Tolerating diet well. Dressing to back C/D/I. PICC to RT arm SL, Dsg C/D/I.  Medications administered per MAR, tolerated well. Blood pressure low this morning, blood pressure medications held and 500 ml NS bolus given. HERMELINDA heels elevated off bed with pillows, daily betadine applied to pressure ulcer. safety precautions maintained, bed alarm on, free from falls/ injury, call bell in reach, instructed to call for needs, voiced understanding, agrees with plan of care.  Worked with PT, see notes.

## 2020-11-05 NOTE — PROGRESS NOTES
Nursing Notes  Bedside report received from LORAINE Holliday. Patient AAOx4. C/o pain to lower back. Will bring PRN when available. POC discussed. Asked to call for assistance.      Huddle Comments

## 2020-11-05 NOTE — PLAN OF CARE
Problem: Adult Inpatient Plan of Care  Goal: Plan of Care Review  Outcome: Ongoing, Progressing  Goal: Patient-Specific Goal (Individualization)  Outcome: Ongoing, Progressing  Goal: Absence of Hospital-Acquired Illness or Injury  Outcome: Ongoing, Progressing  Goal: Optimal Comfort and Wellbeing  Outcome: Ongoing, Progressing  Goal: Readiness for Transition of Care  Outcome: Ongoing, Progressing  Goal: Rounds/Family Conference  Outcome: Ongoing, Progressing     Problem: Bariatric Environmental Safety  Goal: Safety Maintained with Care  Outcome: Ongoing, Progressing     Problem: Fluid Imbalance (Pneumonia)  Goal: Fluid Balance  Outcome: Ongoing, Progressing     Problem: Infection (Pneumonia)  Goal: Resolution of Infection Signs/Symptoms  Outcome: Ongoing, Progressing     Problem: Respiratory Compromise (Pneumonia)  Goal: Effective Oxygenation and Ventilation  Outcome: Ongoing, Progressing     Problem: Infection  Goal: Infection Symptom Resolution  Outcome: Ongoing, Progressing     Problem: Wound  Goal: Optimal Wound Healing  Outcome: Ongoing, Progressing     Problem: Fall Injury Risk  Goal: Absence of Fall and Fall-Related Injury  Outcome: Ongoing, Progressing     Problem: Skin Injury Risk Increased  Goal: Skin Health and Integrity  Outcome: Ongoing, Progressing     Inpatient SWING. Patient received 1g vancomycin given q24h. Vanc trough to be done 11/5. Complains of lower back pain, received PRN hydrocodone. Afebrile throughout the night. Purwick on patient. PICC line dated for 11/2, to be redressed on 11/9. Purple port of PICC does not flush. Red port remains easily flushed, with blood return. Free from falls and injury.

## 2020-11-05 NOTE — PT/OT/SLP PROGRESS
"Physical Therapy Treatment    Patient Name:  Martina Betancourt   MRN:  [id number]    Recommendations:     Discharge Recommendations:  nursing facility, basic   Discharge Equipment Recommendations: lift device   Barriers to discharge: Decreased caregiver support    Assessment:     Martina Betancourt is a 70 y.o. female admitted with a medical diagnosis of Debility.  She presents with the following impairments/functional limitations:  weakness, impaired endurance, decreased ROM, gait instability, impaired self care skills, impaired functional mobilty, decreased lower extremity function, impaired balance, decreased upper extremity function, pain. Limited static stand tolerance using std walker today due to feeling dizziness. Monitored Blood Pressure: During PT session: 138 mm/hg; After PT session: 145/79 mm/hg    Rehab Prognosis: Fair; patient would benefit from acute skilled PT services to address these deficits and reach maximum level of function.    Recent Surgery: * No surgery found *      Plan:     During this hospitalization, patient to be seen daily to address the identified rehab impairments via gait training, therapeutic activities, therapeutic exercises and progress toward the following goals:    · Plan of Care Expires:  11/10/20    Subjective     Chief Complaint: chronic right lower back pain  Patient/Family Comments/goals: "I'll try to do best in Therapy" - per patient  Pain/Comfort:  · Pain Rating 1: 8/10  · Location - Side 1: Right  · Location - Orientation 1: lower  · Location 1: back  · Pain Addressed 1: Cessation of Activity, Reposition      Objective:     Communicated with patient  prior to session.  Patient found supine with PureWick, peripheral IV, oxygen upon PT entry to room.     General Precautions: Standard, fall   Orthopedic Precautions:Full weight bearing   Braces: N/A     Functional Mobility:  · Bed Mobility:     · Rolling Left:  minimal/contact guard assistance and cues using bedrail  · Rolling " Right: minimal/contact guard assistance and cues using bedrail  · Scooting: minimal assistance and cues  · Supine to Sit: minimal assistance and cues  · Sit to Supine: max/moderate assistance and cues in ascending LE  · Transfers:     · Sit to Stand:  max/moderate assistance and cues  with standard walker  · Balance: Standing Static with std walker: Poor+ for 1 minute and 15 seconds(1st attempt); 45 seconds(2nd attempt)      AM-PAC 6 CLICK MOBILITY  Turning over in bed (including adjusting bedclothes, sheets and blankets)?: 3  Sitting down on and standing up from a chair with arms (e.g., wheelchair, bedside commode, etc.): 2  Moving from lying on back to sitting on the side of the bed?: 3  Moving to and from a bed to a chair (including a wheelchair)?: 2  Need to walk in hospital room?: 2  Climbing 3-5 steps with a railing?: 1  Basic Mobility Total Score: 13       Therapeutic Activities and Exercises:   Worked on body mechancis on bed mobility and transfers sit to stand with std walker; Also worked on static stand balance and tolerance ex using std walker    Patient left supine with all lines intact, call button in reach, bed alarm on and nursing notified..    GOALS:   Multidisciplinary Problems     Physical Therapy Goals        Problem: Physical Therapy Goal    Goal Priority Disciplines Outcome Goal Variances Interventions   Physical Therapy Goal     PT, PT/OT Ongoing, Progressing     Description: Goals to be met by: 11/10/20    Patient will increase functional independence with mobility by performin. Rolling to sides using bed rail with contact guard assistance and cues  2. Supine to sit with minimal assistance and cues  3. Sit to supine with moderate assistance and cues  4. Tolerate Static Sit at side of the bed for 10 minutes with Standby Assistance  5. Bed to chair transfer with moderate assistance and cues using Slide Board TECHNIQUE  6. Lower extremity exercise program x15 reps per handout, with  assistance as needed                      Time Tracking:     PT Received On: 11/05/20  PT Start Time: 1333     PT Stop Time: 1400  PT Total Time (min): 27 min     Billable Minutes: Therapeutic Activity 27    Treatment Type: Treatment  PT/PTA: PT     PTA Visit Number: 0     Edgar Lamas, PT  11/05/2020

## 2020-11-05 NOTE — PT/OT/SLP PROGRESS
Occupational Therapy   Treatment    Name: Martina Betancourt  MRN: [id number]  Admitting Diagnosis:  Debility       Recommendations:     Discharge Recommendations: nursing facility, basic  Discharge Equipment Recommendations:  lift device  Barriers to discharge:       Assessment:     Martina Betancourt is a 70 y.o. female with a medical diagnosis of Debility.  She presents with reports of not feeling well at all due to low BP this date but able to participate in bed level functional reaching tasks.     Performance deficits affecting function are weakness, gait instability, decreased upper extremity function, impaired cardiopulmonary response to activity, impaired endurance, impaired balance, decreased lower extremity function, decreased safety awareness, impaired self care skills, impaired cognition, pain, impaired functional mobilty.     Rehab Prognosis:  Fair; patient would benefit from acute skilled OT services to address these deficits and reach maximum level of function.       Plan:     Patient to be seen 5 x/week to address the above listed problems via self-care/home management, therapeutic activities, therapeutic exercises  · Plan of Care Expires: 11/11/20  · Plan of Care Reviewed with: patient    Subjective     Pain/Comfort:  · Pain Rating 1: 10/10  · Location - Orientation 1: lower  · Location 1: back  · Pain Addressed 1: Cessation of Activity  · Pain Rating Post-Intervention 1: 10/10    Objective:     Communicated with: Nursing prior to session.  Patient found supine with PureWick, peripheral IV, oxygen upon OT entry to room.    General Precautions: Standard, fall   Orthopedic Precautions:Full weight bearing   Braces: N/A     Occupational Performance:   Patient declined, not feeling well      [facility] 6 Click ADL: 12    Treatment & Education:  Therapist educated patient on importance of participation in therapy, willing to participate in functional reaching task bed level with Min A for proper body mechanics to  improve UE function for dressing tasks     Patient left supine with all lines intact, call button in reach and nursing notifiedEducation:      GOALS:   Multidisciplinary Problems     Occupational Therapy Goals        Problem: Occupational Therapy Goal    Goal Priority Disciplines Outcome Interventions   Occupational Therapy Goal     OT, PT/OT Ongoing, Progressing    Description: Goals to be met by: 11/11/2020     Patient will increase functional independence with ADLs by performing:    Feeding with Supervision. (GOAL MET)  UE Dressing with Supervision.  LE Dressing with Moderate Assistance.  Grooming while seated with Supervision (GOAL MET).  Toileting from bedside commode with Minimal Assistance for hygiene and clothing management.   Sitting at edge of bed x5 minutes with Supervision. (GOAL MET)  Rolling to Bilateral with Supervision. (GOAL MET)  Supine to sit with Minimal Assistance. (GOAL MET)  Squat pivot transfers with Minimal Assistance.  Toilet transfer to bedside commode with Minimal Assistance.  Upper extremity exercise program x10 reps per handout, with assistance as needed. (GOAL MET)                     Time Tracking:     OT Date of Treatment: 11/05/20  OT Start Time: 1111  OT Stop Time: 1120  OT Total Time (min): 9 min    Billable Minutes:Therapeutic Activity 9 minutes    Golden Bartlett OT  11/5/2020

## 2020-11-06 LAB
ANION GAP SERPL CALC-SCNC: 11 MMOL/L (ref 8–16)
ANION GAP SERPL CALC-SCNC: 12 MMOL/L (ref 8–16)
BUN SERPL-MCNC: 14 MG/DL (ref 8–23)
BUN SERPL-MCNC: 14 MG/DL (ref 8–23)
CALCIUM SERPL-MCNC: 10 MG/DL (ref 8.7–10.5)
CALCIUM SERPL-MCNC: 10.1 MG/DL (ref 8.7–10.5)
CHLORIDE SERPL-SCNC: 101 MMOL/L (ref 95–110)
CHLORIDE SERPL-SCNC: 101 MMOL/L (ref 95–110)
CO2 SERPL-SCNC: 27 MMOL/L (ref 23–29)
CO2 SERPL-SCNC: 28 MMOL/L (ref 23–29)
CREAT SERPL-MCNC: 1 MG/DL (ref 0.5–1.4)
CREAT SERPL-MCNC: 1.1 MG/DL (ref 0.5–1.4)
EST. GFR  (AFRICAN AMERICAN): 58 ML/MIN/1.73 M^2
EST. GFR  (AFRICAN AMERICAN): >60 ML/MIN/1.73 M^2
EST. GFR  (NON AFRICAN AMERICAN): 50 ML/MIN/1.73 M^2
EST. GFR  (NON AFRICAN AMERICAN): 56 ML/MIN/1.73 M^2
GLUCOSE SERPL-MCNC: 96 MG/DL (ref 70–110)
GLUCOSE SERPL-MCNC: 98 MG/DL (ref 70–110)
POTASSIUM SERPL-SCNC: 3.4 MMOL/L (ref 3.5–5.1)
POTASSIUM SERPL-SCNC: 3.5 MMOL/L (ref 3.5–5.1)
SODIUM SERPL-SCNC: 140 MMOL/L (ref 136–145)
SODIUM SERPL-SCNC: 140 MMOL/L (ref 136–145)

## 2020-11-06 PROCEDURE — 25000242 PHARM REV CODE 250 ALT 637 W/ HCPCS: Performed by: NURSE PRACTITIONER

## 2020-11-06 PROCEDURE — 36415 COLL VENOUS BLD VENIPUNCTURE: CPT

## 2020-11-06 PROCEDURE — 63600175 PHARM REV CODE 636 W HCPCS: Performed by: FAMILY MEDICINE

## 2020-11-06 PROCEDURE — 94799 UNLISTED PULMONARY SVC/PX: CPT

## 2020-11-06 PROCEDURE — 99308 SBSQ NF CARE LOW MDM 20: CPT | Mod: ,,, | Performed by: INTERNAL MEDICINE

## 2020-11-06 PROCEDURE — 94640 AIRWAY INHALATION TREATMENT: CPT

## 2020-11-06 PROCEDURE — 11000004 HC SNF PRIVATE

## 2020-11-06 PROCEDURE — 25000003 PHARM REV CODE 250: Performed by: NURSE PRACTITIONER

## 2020-11-06 PROCEDURE — 94761 N-INVAS EAR/PLS OXIMETRY MLT: CPT

## 2020-11-06 PROCEDURE — 80048 BASIC METABOLIC PNL TOTAL CA: CPT

## 2020-11-06 PROCEDURE — 27000221 HC OXYGEN, UP TO 24 HOURS

## 2020-11-06 PROCEDURE — 25000003 PHARM REV CODE 250: Performed by: INTERNAL MEDICINE

## 2020-11-06 PROCEDURE — 99308 PR NURSING FAC CARE, SUBSEQ, MINOR COMPLIC: ICD-10-PCS | Mod: ,,, | Performed by: INTERNAL MEDICINE

## 2020-11-06 PROCEDURE — A4216 STERILE WATER/SALINE, 10 ML: HCPCS | Performed by: INTERNAL MEDICINE

## 2020-11-06 PROCEDURE — 25000003 PHARM REV CODE 250: Performed by: FAMILY MEDICINE

## 2020-11-06 PROCEDURE — 97535 SELF CARE MNGMENT TRAINING: CPT

## 2020-11-06 RX ORDER — ASPIRIN 81 MG/1
81 TABLET ORAL DAILY
Status: DISCONTINUED | OUTPATIENT
Start: 2020-11-06 | End: 2020-12-01

## 2020-11-06 RX ORDER — CLOPIDOGREL BISULFATE 75 MG/1
75 TABLET ORAL DAILY
Status: DISCONTINUED | OUTPATIENT
Start: 2020-11-06 | End: 2020-12-01

## 2020-11-06 RX ORDER — POTASSIUM CHLORIDE 20 MEQ/1
40 TABLET, EXTENDED RELEASE ORAL ONCE
Status: COMPLETED | OUTPATIENT
Start: 2020-11-06 | End: 2020-11-06

## 2020-11-06 RX ADMIN — GUAIFENESIN 600 MG: 600 TABLET, EXTENDED RELEASE ORAL at 08:11

## 2020-11-06 RX ADMIN — ACETAMINOPHEN 500 MG: 500 TABLET, FILM COATED ORAL at 08:11

## 2020-11-06 RX ADMIN — METHOCARBAMOL TABLETS 500 MG: 500 TABLET, COATED ORAL at 08:11

## 2020-11-06 RX ADMIN — ISOSORBIDE MONONITRATE 60 MG: 60 TABLET, EXTENDED RELEASE ORAL at 08:11

## 2020-11-06 RX ADMIN — DULOXETINE 60 MG: 30 CAPSULE, DELAYED RELEASE ORAL at 08:11

## 2020-11-06 RX ADMIN — METOPROLOL SUCCINATE 50 MG: 50 TABLET, EXTENDED RELEASE ORAL at 08:11

## 2020-11-06 RX ADMIN — LISINOPRIL 10 MG: 10 TABLET ORAL at 08:11

## 2020-11-06 RX ADMIN — HYDROCODONE BITARTRATE AND ACETAMINOPHEN 1 TABLET: 10; 325 TABLET ORAL at 08:11

## 2020-11-06 RX ADMIN — IPRATROPIUM BROMIDE AND ALBUTEROL SULFATE 3 ML: .5; 3 SOLUTION RESPIRATORY (INHALATION) at 07:11

## 2020-11-06 RX ADMIN — Medication 10 ML: at 11:11

## 2020-11-06 RX ADMIN — MEMANTINE 10 MG: 10 TABLET ORAL at 09:11

## 2020-11-06 RX ADMIN — FERROUS SULFATE TAB 325 MG (65 MG ELEMENTAL FE) 325 MG: 325 (65 FE) TAB at 09:11

## 2020-11-06 RX ADMIN — CLOPIDOGREL 75 MG: 75 TABLET, FILM COATED ORAL at 08:11

## 2020-11-06 RX ADMIN — MICONAZOLE NITRATE: 20 OINTMENT TOPICAL at 08:11

## 2020-11-06 RX ADMIN — DONEPEZIL HYDROCHLORIDE 10 MG: 5 TABLET, FILM COATED ORAL at 09:11

## 2020-11-06 RX ADMIN — METHOCARBAMOL TABLETS 500 MG: 500 TABLET, COATED ORAL at 04:11

## 2020-11-06 RX ADMIN — VANCOMYCIN HYDROCHLORIDE 1000 MG: 1 INJECTION, POWDER, LYOPHILIZED, FOR SOLUTION INTRAVENOUS at 12:11

## 2020-11-06 RX ADMIN — HYDROCODONE BITARTRATE AND ACETAMINOPHEN 1 TABLET: 10; 325 TABLET ORAL at 10:11

## 2020-11-06 RX ADMIN — PANTOPRAZOLE SODIUM 40 MG: 40 TABLET, DELAYED RELEASE ORAL at 05:11

## 2020-11-06 RX ADMIN — ACETAMINOPHEN 500 MG: 500 TABLET, FILM COATED ORAL at 09:11

## 2020-11-06 RX ADMIN — METHOCARBAMOL TABLETS 500 MG: 500 TABLET, COATED ORAL at 01:11

## 2020-11-06 RX ADMIN — GUAIFENESIN 600 MG: 600 TABLET, EXTENDED RELEASE ORAL at 09:11

## 2020-11-06 RX ADMIN — Medication 10 ML: at 01:11

## 2020-11-06 RX ADMIN — FUROSEMIDE 40 MG: 40 TABLET ORAL at 08:11

## 2020-11-06 RX ADMIN — MICONAZOLE NITRATE: 20 OINTMENT TOPICAL at 09:11

## 2020-11-06 RX ADMIN — METHOCARBAMOL TABLETS 500 MG: 500 TABLET, COATED ORAL at 09:11

## 2020-11-06 RX ADMIN — Medication 10 ML: at 07:11

## 2020-11-06 RX ADMIN — Medication 10 ML: at 06:11

## 2020-11-06 RX ADMIN — PRAVASTATIN SODIUM 40 MG: 40 TABLET ORAL at 09:11

## 2020-11-06 RX ADMIN — MEMANTINE 10 MG: 10 TABLET ORAL at 08:11

## 2020-11-06 RX ADMIN — ASPIRIN 81 MG: 81 TABLET, COATED ORAL at 08:11

## 2020-11-06 RX ADMIN — ACETAMINOPHEN 500 MG: 500 TABLET, FILM COATED ORAL at 04:11

## 2020-11-06 RX ADMIN — ONDANSETRON 4 MG: 4 TABLET, ORALLY DISINTEGRATING ORAL at 08:11

## 2020-11-06 RX ADMIN — POTASSIUM CHLORIDE 40 MEQ: 1500 TABLET, EXTENDED RELEASE ORAL at 08:11

## 2020-11-06 RX ADMIN — IPRATROPIUM BROMIDE AND ALBUTEROL SULFATE 3 ML: .5; 3 SOLUTION RESPIRATORY (INHALATION) at 02:11

## 2020-11-06 RX ADMIN — HYDROCODONE BITARTRATE AND ACETAMINOPHEN 1 TABLET: 10; 325 TABLET ORAL at 04:11

## 2020-11-06 NOTE — PROGRESS NOTES
Pharmacokinetic Assessment Follow Up: IV Vancomycin    Vancomycin serum concentration assessment(s):    The trough level was drawn correctly and can be used to guide therapy at this time. The measurement is above the desired definitive target range of 15 to 20 mcg/mL.    Vancomycin Regimen Plan:    Discontinue the scheduled vancomycin regimen and re-dose when the random level is less than 20 mcg/mL, next level to be drawn at 1100 on 11/7/2020.    Drug levels (last 3 results):  Recent Labs   Lab Result Units 11/05/20  2332   Vancomycin-Trough ug/mL 21.1       Pharmacy will continue to follow and monitor vancomycin.    Please contact pharmacy at extension [phone] for questions regarding this assessment.    Thank you for the consult,   Amaya Richardson       Patient brief summary:  Martina Betancourt is a 70 y.o. female initiated on antimicrobial therapy with IV Vancomycin for treatment of bone/joint infection    The patient's current regimen is Vancomycin 1000 mg IV Q24    Drug Allergies:   Review of patient's allergies indicates:   Allergen Reactions    Hydroxyzine hcl     Tizanidine        Actual Body Weight:   132.7 kg    Renal Function:   Estimated Creatinine Clearance: 75.6 mL/min (based on SCr of 1 mg/dL).,     Dialysis Method (if applicable):  N/A    CBC (last 72 hours):  Recent Labs   Lab Result Units 11/05/20  0410   WBC K/uL 8.09   Hemoglobin g/dL 10.1*   Hematocrit % 34.3*   Platelets K/uL 423*   Gran % % 57.5   Lymph % % 26.1   Mono % % 11.5   Eosinophil % % 3.8   Basophil % % 0.6   Differential Method  Automated       Metabolic Panel (last 72 hours):  Recent Labs   Lab Result Units 11/05/20  0410 11/05/20  2332 11/06/20  0550   Sodium mmol/L 139 140 140   Potassium mmol/L 3.4* 3.5 3.4*   Chloride mmol/L 98 101 101   CO2 mmol/L 30* 27 28   Glucose mg/dL 95 98 96   BUN mg/dL 13 14 14   Creatinine mg/dL 1.3 1.1 1.0   Magnesium mg/dL 1.9  --   --    Phosphorus mg/dL 4.7*  --   --        Vancomycin  Administrations:  vancomycin given in the last 96 hours                   vancomycin in dextrose 5 % 1 gram/250 mL IVPB 1,000 mg (mg) 1,000 mg New Bag 11/06/20 0006     1,000 mg New Bag 11/04/20 2345     1,000 mg New Bag 11/03/20 2339     1,000 mg New Bag  0027                Microbiologic Results:  Microbiology Results (last 7 days)     Procedure Component Value Units Date/Time    Stool culture [[id number]] Collected: 10/26/20 1827    Order Status: Completed Specimen: Stool Updated: 10/30/20 1442     Stool Culture No Salmonella,Shigella,Vibrio,Campylobacter,Yersinia isolated.

## 2020-11-06 NOTE — PT/OT/SLP PROGRESS
Occupational Therapy   Treatment    Name: Martina Betancourt  MRN: [id number]  Admitting Diagnosis:  Debility       Recommendations:     Discharge Recommendations: nursing facility, basic  Discharge Equipment Recommendations:  lift device  Barriers to discharge:       Assessment:     Martina Betancourt is a 70 y.o. female with a medical diagnosis of Debility.  She presents with nausea upon OT entry into room but willing to participate in therapy with nursing and OT encouragement. Therapist facilitated UBD with supervision and LBD with Mod A to don pajamas. Patient continues to benefit from OT services at this time.     Performance deficits affecting function are weakness, impaired endurance, impaired cognition, decreased coordination, impaired coordination, impaired self care skills, decreased upper extremity function, impaired functional mobilty, decreased lower extremity function, decreased safety awareness, impaired balance, impaired cardiopulmonary response to activity, pain.     Rehab Prognosis:  Fair; patient would benefit from acute skilled OT services to address these deficits and reach maximum level of function.       Plan:     Patient to be seen 5 x/week to address the above listed problems via self-care/home management, therapeutic activities, therapeutic exercises  · Plan of Care Expires: 11/11/20  · Plan of Care Reviewed with: patient    Subjective     Pain/Comfort:  · Pain Rating 1: 8/10  · Location - Side 1: Right  · Location - Orientation 1: lower  · Location 1: back  · Pain Addressed 1: Reposition, Nurse notified, Distraction  · Pain Rating Post-Intervention 1: 8/10    Objective:     Communicated with: Nursing prior to session.  Patient found supine with PureWick, peripheral IV, oxygen upon OT entry to room.    General Precautions: Standard, fall   Orthopedic Precautions:Full weight bearing   Braces: N/A     Occupational Performance:     Bed Mobility:    · Patient completed Rolling/Turning to Left with   supervision  · Patient completed Rolling/Turning to Right with supervision  · Patient completed Supine to Sit with contact guard assistance  · Patient completed Sit to Supine with moderate assistance     Functional Mobility/Transfers:  Patient declined due to nausea     Activities of Daily Living:  · Upper Body Dressing: supervision to don pajama top  · Lower Body Dressing: moderate assistance to don pajama bottom      [facility] 6 Click ADL: 14    Treatment & Education:  Therapist educated patient on importance of participation in therapy and OOB tasks as well as functional reaching such as grabbing water container. Therapist educated patient on sequencing for dressing to achieve with increased independence and provided verbal cues for core control to maintain LE at EOB.     Patient left supine with all lines intact, call button in reach, bed alarm on and nursing notifiedEducation:      GOALS:   Multidisciplinary Problems     Occupational Therapy Goals        Problem: Occupational Therapy Goal    Goal Priority Disciplines Outcome Interventions   Occupational Therapy Goal     OT, PT/OT Ongoing, Progressing    Description: Goals to be met by: 11/11/2020     Patient will increase functional independence with ADLs by performing:    Feeding with Supervision. (GOAL MET)  UE Dressing with Supervision.  LE Dressing with Moderate Assistance.  Grooming while seated with Supervision (GOAL MET).  Toileting from bedside commode with Minimal Assistance for hygiene and clothing management.   Sitting at edge of bed x5 minutes with Supervision. (GOAL MET)  Rolling to Bilateral with Supervision. (GOAL MET)  Supine to sit with Minimal Assistance. (GOAL MET)  Squat pivot transfers with Minimal Assistance.  Toilet transfer to bedside commode with Minimal Assistance.  Upper extremity exercise program x10 reps per handout, with assistance as needed. (GOAL MET)                     Time Tracking:     OT Date of Treatment: 11/06/20  OT Start  Time: 0835  OT Stop Time: 0913  OT Total Time (min): 38 min    Billable Minutes:Self Care/Home Management 38 minutes    Golden Bartlett OT  11/6/2020

## 2020-11-06 NOTE — ASSESSMENT & PLAN NOTE
"Productive cough this am per nurse- " pale green, grey"   Add mucinex and give neb tx q 6 while awake  No fever, WBC normal   Increase activity .      "

## 2020-11-06 NOTE — PROGRESS NOTES
Nursing Notes  Bedside report received from LORAINE Holliday. Patient AAOx4. POC discussed. Asked to call for assistance.      Huddle Comments

## 2020-11-06 NOTE — PROGRESS NOTES
[facility] Medicine  Progress Note    Patient Name: Martina Betancourt  MRN: [id number]  Patient Class: IP- Swing   Admission Date: 10/20/2020  Length of Stay: 17 days  Attending Physician: Vamsi Manuel MD  Primary Care Provider: Joe Fuller Iii, MD        Subjective:     Principal Problem:Debility        HPI:  71yo female patient with hx of alzheiners, CAD, HLD, HTN, myopathy, MI, obesity, sleep apnea and OA (knees non ambulatory uses gonzalez round). She was living at home with her daughter. Recently admitted to [facility] with MRSA bacteremia and subsequent pneumonia treated with Zyvox x 7 days with clearance of her bacteremia now admitted with back pain found to have T9-10 osteo discitis, T8-10 epidural phlegmon with associated paraverterbral abscesses. Spine infection likely hematogenous from under treated bacteremia. Neurosurgery has been consulted. She has been on Vancomycin and Ceftriaxone. Underwent T9-10 disc space biopsy with IR on 10/16. Cultures negative, though had been on IV antibiotics multiple days prior. Afebrile. HDS. Repeat blood cx sterile. ID rec cont vanc 1750mg q 24 till 12/3.     Overview/Hospital Course:  10/23 Here for skilled ; needing IV abx for spinal abscess;  vanc 1750mg q 24 till 12/3  Afebrile , 3LNC - O2 sat 95%   + debility, very deconditioned; bilt LE x 10 reps followed by bed mobility trng and static sit balance and tolerance at side of the bed  C/o back pain with activity; Occupational therapist notes that she is very resistant to movement and c/o severe pain with minimal activity.   Will order toradol 15mg x 1dose IV; pt did much better after toradol rx today per OT. Will order daily prior to OT as tolerated  Monitor renal fx     10/26 here for skilled therapy and cont IV abc. Vanc 1750mg iv every 12hr. Noted elevated WBC this am and she report that she has had diarrhea for the last 4 days.   · PT reports Supine to Sit: maximal assistance, of 2  persons and patient able to reach with UE to push through rail and with improved push off with UE and initiation  · Sit to Supine: maximal assistance of 2 people with improved ability of patient to control descent of upper body  · Transfers:     · Sit to Stand:  moderate assistance, maximal assistance of 2 persons with no AD.  PT and PT tech blocking both knees while assisting pt to elevate hips from bed  Balance: pt able to stand x15 seconds EOB limited largely by knee and back pain.       10/28  She is still on vanc IV daily. No longer with diarrhea. Did have heme positive stools H/H stable on lovenox for DVT prophylaxis and plavix. Will monitor h/h M/Thurs. No fever. No elevated WBC  pt is really immobile.  · Bed Mobility:     · Rolling Left:  moderate assistance  · Rolling Right: moderate assistance  · Scooting: maximal assistance  · Supine to Sit: maximum/moderate assistace and cues  · Sit to Supine: maximum assistace x 1-2 and cues  · Transfers:     · Sit to Stand:  maximum assistance and cues inside the parallel bars with facilitation tech  · Bed to Chair: to wheelchair  with  maximum assistance and cues  using  Slide Board  · Balance: Standing Static inside the parallel bars with Poor grade. Tolerated for ~10 seconds with 2 attempts with  Maximum assistance and cues.  Cont PT daily  10/30  IV  vanc 1,000mg q 24hr x 8 weeks total till 12/3 per ID for discitis; random vanc 12.5 yesterday    No longer with diarrhea. Did have heme positive stools H/H stable on lovenox for DVT prophylaxis and plavix. H&H stable, lovenox stopped. No fever. No elevated WBC. She has productive cough this am. K+ 3.2 yesterday, getting lasix, will repeat KCL 40meq today   pt is really immobile.Standing with RW Static: Poor for ~15 seconds tolerance with max Assistance and cues  Has PICC line- one port clotted;       11/2 she remains on vanc 1000mg  q 24hr x 8 weeks total till 12/3 per ID for discitis. Vanc trough 15.8 10/31/20.  Vss/afebrile. intermittent back pain with prn pain meds helping. She is not doing much with PT. She uses gonzalez round at home but can transfer independently. Here she is max assist     11/4 Remains on vanc 1000mg  q 24hr x 8 weeks total till 12/3 per ID for discitis. Vanc trough 16.6 on 11/2   Afebrile , having regular BMs; getting Norco 10mg q 6   · Continues to require max assist; Sit to Stand:  Max/moderate assistance and cues with standard walker  Balance: Static Stand with Std Walker: Fair- with 2 mins and 7 seconds  tolerance and max/moderate assistance and cues  11/4 Remains on vanc 1000mg  q 24hr x 8 weeks total till 12/3 per ID for discitis. Vanc trough 16.6 on 11/2   Afebrile, WBC nml, creat stable , K+3.4  Requires maximal assist but finally standing with PT with walker ..        Review of Systems   Constitutional: Negative for activity change, fatigue, fever and unexpected weight change.   HENT: Negative for congestion, ear pain, hearing loss, rhinorrhea and sore throat.    Eyes: Negative for redness and visual disturbance.   Respiratory: Negative for cough, shortness of breath and wheezing.    Cardiovascular: Negative for chest pain, palpitations and leg swelling.   Gastrointestinal: Negative for abdominal pain, constipation, diarrhea (watery, non-bloody), nausea and vomiting.   Genitourinary: Negative for dysuria, frequency and urgency.   Musculoskeletal: Positive for back pain. Negative for joint swelling and neck pain.   Skin: Negative for color change, rash and wound.   Neurological: Negative for dizziness, tremors, weakness, light-headedness and headaches.   Psychiatric/Behavioral: Negative for confusion and decreased concentration.     Objective:     Vital Signs (Most Recent):  Temp: 97.8 °F (36.6 °C) (11/06/20 0712)  Pulse: 89 (11/06/20 0714)  Resp: 20 (11/06/20 0853)  BP: 135/64 (11/06/20 0712)  SpO2: (!) 94 % (11/06/20 0714) Vital Signs (24h Range):  Temp:  [97.3 °F (36.3 °C)-97.8 °F (36.6 °C)]  97.8 °F (36.6 °C)  Pulse:  [70-89] 89  Resp:  [16-20] 20  SpO2:  [94 %-95 %] 94 %  BP: (114-135)/(58-64) 135/64     Weight: 132.7 kg (292 lb 8.8 oz)  Body mass index is 41.98 kg/m².    Intake/Output Summary (Last 24 hours) at 11/6/2020 1013  Last data filed at 11/6/2020 0900  Gross per 24 hour   Intake 1200 ml   Output 1000 ml   Net 200 ml      Physical Exam  Vitals signs and nursing note reviewed.   Constitutional:       General: She is not in acute distress.     Appearance: She is well-developed. She is obese.   HENT:      Head: Normocephalic and atraumatic.      Right Ear: External ear normal.      Left Ear: External ear normal.      Nose: Nose normal.      Mouth/Throat:      Mouth: Mucous membranes are moist.      Pharynx: Oropharynx is clear.   Eyes:      General:         Right eye: No discharge.         Left eye: No discharge.      Extraocular Movements: Extraocular movements intact.      Conjunctiva/sclera: Conjunctivae normal.      Pupils: Pupils are equal, round, and reactive to light.   Neck:      Musculoskeletal: Neck supple.      Thyroid: No thyromegaly.   Cardiovascular:      Rate and Rhythm: Normal rate and regular rhythm.      Heart sounds: No murmur.   Pulmonary:      Effort: Pulmonary effort is normal. No respiratory distress.      Breath sounds: No wheezing, rhonchi or rales.   Abdominal:      General: Bowel sounds are normal. There is no distension.      Palpations: Abdomen is soft.      Tenderness: There is no abdominal tenderness.   Musculoskeletal:      Right lower leg: No edema.      Left lower leg: No edema.   Lymphadenopathy:      Cervical: No cervical adenopathy.   Skin:     General: Skin is warm and dry.   Neurological:      General: No focal deficit present.      Mental Status: She is alert.      Cranial Nerves: No cranial nerve deficit.      Comments: Obese, not very cooperative with PT.  Max assistance   Psychiatric:         Behavior: Behavior normal.         Significant Labs:   BMP:    Recent Labs   Lab 11/05/20  0410  11/06/20  0550   GLU 95   < > 96      < > 140   K 3.4*   < > 3.4*   CL 98   < > 101   CO2 30*   < > 28   BUN 13   < > 14   CREATININE 1.3   < > 1.0   CALCIUM 10.0   < > 10.1   MG 1.9  --   --     < > = values in this interval not displayed.     CBC:   Recent Labs   Lab 11/05/20  0410   WBC 8.09   HGB 10.1*   HCT 34.3*   *           Assessment/Plan:      * Debility  Was walking with walker prior to pneumonia/bacteremia admit last month then after d.c was only w/c bound (gonzalez round) will add pt here and try and get her as strong as poss as she lives at home with family.   10/23 Chronic co back ache but also has recent high ankle fx ; per EDDIE Vinson NP Spoke with Ariela VASQUES who will see patient while on SWING. She looked over x rays and hx and reports that patient can weight bear as tolerated. Nurse/OT/MD notified    · 10/26 Supine to Sit: maximal assistance, of 2 persons and patient able to reach with UE to push through rail and with improved push off with UE and initiation  · Sit to Supine: maximal assistance of 2 people with improved ability of patient to control descent of upper body  · Transfers:     · Sit to Stand:  moderate assistance, maximal assistance of 2 persons with no AD.  PT and PT tech blocking both knees while assisting pt to elevate hips from bed  Balance: pt able to stand x15 seconds EOB limited largely by knee and back pain.     10/28.  · Bed Mobility:     · Rolling Left:  moderate assistance  · Rolling Right: moderate assistance  · Scooting: maximal assistance  · Supine to Sit: maximum/moderate assistace and cues  · Sit to Supine: maximum assistace x 1-2 and cues  · Transfers:     · Sit to Stand:  maximum assistance and cues inside the parallel bars with facilitation tech  · Bed to Chair: to wheelchair  with  maximum assistance and cues  using  Slide Board  · Balance: Standing Static inside the parallel bars with Poor grade. Tolerated for  "~10 seconds with 2 attempts with  Maximum assistance and cues.  10/30 Standing with RW Static: Poor for ~15 seconds tolerance with max Assistance and cues  11/2  wheelchair maximum assistance x 2 and cues  with  slide board  using  slide/scoot tech  11/4 Sit to Stand:  Max/moderate assistance and cues with standard walker  Balance: Static Stand with Std Walker: Fair- with 2 mins and 7 seconds  tolerance and max/moderate assistance and cues  · 11/6 max/moderate assistance and cues  with standard walker  Balance: Standing Static with std walker: Poor+ for 1 minute and 15 seconds(1st attempt); 45 seconds(2nd attempt)    Cough productive of purulent sputum  Productive cough this am per nurse- " pale green, grey"   Add mucinex and give neb tx q 6 while awake  No fever, WBC normal   Increase activity .        Diarrhea  Stools d/c as diarrhea has improved. + heme occult .    Malnutrition of mild degree  Dietary  Boost.      Hydronephrosis  Cont asa, plavix, lasix, isosorbide, lisinopril, toprol and statin.      CAD (coronary artery disease)  Cont asa, plavix, lasix, isosorbide, lisinopril, toprol and statin  Time to stop Lovenox.    HTN (hypertension)  Increase lisinopril 2.5>10mg daily  10/23 SBP 160s at times; lisinopril just increased will give more time for lisinopril to work before titration  10/26 SBP 140s; cont to monitor  10/28 /80- increase lisinopril  10/30 BP much better 119/59- 138/62  .  11/2 /58.  VSS     Discitis  vanc 1450mg IV every 24hr x 8 weeks total till 12/3 per ID ; will stay here for the duration   PT  vanc now 1000mg IV every 24hr till 12/3 per ID last vanc trough 10/31 15.8    Reach out to ns today for guidance on re-imaging  Noted per ID Plan for 6 - 8 weeks of IV antibiotics with repeat MRI at midpoint of treatment as no surgical drainage undertaken.  - ID will follow.  She is currently midpoint; will plan for MRI of thoracic on Monday .    Severe obesity (BMI >= 40)  Using boost as " she has low appetite and low protein .      Obstructive sleep apnea treated with continuous positive airway pressure (CPAP)  Bring home cpap.      Dementia without behavioral disturbance  Cont namenda and aricept .      VTE Risk Mitigation (From admission, onward)    None          Discharge Planning   BRIT: 12/3/2020     Code Status: DNR   Is the patient medically ready for discharge?:     Reason for patient still in hospital (select all that apply): Treatment  Discharge Plan A: Home with family, Home Health                  Julio César Huntley MD  Department of Hospital Medicine   [facility]

## 2020-11-06 NOTE — PT/OT/SLP PROGRESS
Physical Therapy      Patient Name:  Martina Betancourt   MRN:  [id number]    Patient not seen today secondary to Patient ill (Comment)(Patient refused PT tx due to having stomach ache today and did not sleep good last night). Notified nursing. Will follow-up tomorrow.    Edgar Lamas, PT

## 2020-11-06 NOTE — PLAN OF CARE
Problem: Adult Inpatient Plan of Care  Goal: Plan of Care Review  Outcome: Ongoing, Progressing  Goal: Patient-Specific Goal (Individualization)  Outcome: Ongoing, Progressing  Goal: Absence of Hospital-Acquired Illness or Injury  Outcome: Ongoing, Progressing  Goal: Optimal Comfort and Wellbeing  Outcome: Ongoing, Progressing  Goal: Readiness for Transition of Care  Outcome: Ongoing, Progressing  Goal: Rounds/Family Conference  Outcome: Ongoing, Progressing     Problem: Bariatric Environmental Safety  Goal: Safety Maintained with Care  Outcome: Ongoing, Progressing     Problem: Fluid Imbalance (Pneumonia)  Goal: Fluid Balance  Outcome: Ongoing, Progressing     Problem: Infection (Pneumonia)  Goal: Resolution of Infection Signs/Symptoms  Outcome: Ongoing, Progressing     Problem: Respiratory Compromise (Pneumonia)  Goal: Effective Oxygenation and Ventilation  Outcome: Ongoing, Progressing     Problem: Infection  Goal: Infection Symptom Resolution  Outcome: Ongoing, Progressing     Problem: Wound  Goal: Optimal Wound Healing  Outcome: Ongoing, Progressing     Problem: Fall Injury Risk  Goal: Absence of Fall and Fall-Related Injury  Outcome: Ongoing, Progressing     Problem: Skin Injury Risk Increased  Goal: Skin Health and Integrity  Outcome: Ongoing, Progressing     Inpatient SWING. Patient received 1g vancomycin given q24h. Vanc trough doen tonight. WNL.. Afebrile throughout the night. Purwick on patient. PICC line dated for 11/2, to be redressed on 11/9. Purple port of PICC does not flush. Red port remains easily flushed, with blood return. PRN TUMS and zofran was given. Free from falls and injury.

## 2020-11-06 NOTE — ASSESSMENT & PLAN NOTE
Increase lisinopril 2.5>10mg daily  10/23 SBP 160s at times; lisinopril just increased will give more time for lisinopril to work before titration  10/26 SBP 140s; cont to monitor  10/28 /80- increase lisinopril  10/30 BP much better 119/59- 138/62  .  11/2 /58.  VSS

## 2020-11-06 NOTE — ASSESSMENT & PLAN NOTE
vanc 1450mg IV every 24hr x 8 weeks total till 12/3 per ID ; will stay here for the duration   PT  vanc now 1000mg IV every 24hr till 12/3 per ID last vanc trough 10/31 15.8    Reach out to ns today for guidance on re-imaging  Noted per ID Plan for 6 - 8 weeks of IV antibiotics with repeat MRI at midpoint of treatment as no surgical drainage undertaken.  - ID will follow.  She is currently midpoint; will plan for MRI of thoracic on Monday .

## 2020-11-06 NOTE — SUBJECTIVE & OBJECTIVE
Review of Systems   Constitutional: Negative for activity change, fatigue, fever and unexpected weight change.   HENT: Negative for congestion, ear pain, hearing loss, rhinorrhea and sore throat.    Eyes: Negative for redness and visual disturbance.   Respiratory: Negative for cough, shortness of breath and wheezing.    Cardiovascular: Negative for chest pain, palpitations and leg swelling.   Gastrointestinal: Negative for abdominal pain, constipation, diarrhea (watery, non-bloody), nausea and vomiting.   Genitourinary: Negative for dysuria, frequency and urgency.   Musculoskeletal: Positive for back pain. Negative for joint swelling and neck pain.   Skin: Negative for color change, rash and wound.   Neurological: Negative for dizziness, tremors, weakness, light-headedness and headaches.   Psychiatric/Behavioral: Negative for confusion and decreased concentration.     Objective:     Vital Signs (Most Recent):  Temp: 97.8 °F (36.6 °C) (11/06/20 0712)  Pulse: 89 (11/06/20 0714)  Resp: 20 (11/06/20 0853)  BP: 135/64 (11/06/20 0712)  SpO2: (!) 94 % (11/06/20 0714) Vital Signs (24h Range):  Temp:  [97.3 °F (36.3 °C)-97.8 °F (36.6 °C)] 97.8 °F (36.6 °C)  Pulse:  [70-89] 89  Resp:  [16-20] 20  SpO2:  [94 %-95 %] 94 %  BP: (114-135)/(58-64) 135/64     Weight: 132.7 kg (292 lb 8.8 oz)  Body mass index is 41.98 kg/m².    Intake/Output Summary (Last 24 hours) at 11/6/2020 1013  Last data filed at 11/6/2020 0900  Gross per 24 hour   Intake 1200 ml   Output 1000 ml   Net 200 ml      Physical Exam  Vitals signs and nursing note reviewed.   Constitutional:       General: She is not in acute distress.     Appearance: She is well-developed. She is obese.   HENT:      Head: Normocephalic and atraumatic.      Right Ear: External ear normal.      Left Ear: External ear normal.      Nose: Nose normal.      Mouth/Throat:      Mouth: Mucous membranes are moist.      Pharynx: Oropharynx is clear.   Eyes:      General:         Right eye:  No discharge.         Left eye: No discharge.      Extraocular Movements: Extraocular movements intact.      Conjunctiva/sclera: Conjunctivae normal.      Pupils: Pupils are equal, round, and reactive to light.   Neck:      Musculoskeletal: Neck supple.      Thyroid: No thyromegaly.   Cardiovascular:      Rate and Rhythm: Normal rate and regular rhythm.      Heart sounds: No murmur.   Pulmonary:      Effort: Pulmonary effort is normal. No respiratory distress.      Breath sounds: No wheezing, rhonchi or rales.   Abdominal:      General: Bowel sounds are normal. There is no distension.      Palpations: Abdomen is soft.      Tenderness: There is no abdominal tenderness.   Musculoskeletal:      Right lower leg: No edema.      Left lower leg: No edema.   Lymphadenopathy:      Cervical: No cervical adenopathy.   Skin:     General: Skin is warm and dry.   Neurological:      General: No focal deficit present.      Mental Status: She is alert.      Cranial Nerves: No cranial nerve deficit.      Comments: Obese, not very cooperative with PT.  Max assistance   Psychiatric:         Behavior: Behavior normal.         Significant Labs:   BMP:   Recent Labs   Lab 11/05/20 0410 11/06/20  0550   GLU 95   < > 96      < > 140   K 3.4*   < > 3.4*   CL 98   < > 101   CO2 30*   < > 28   BUN 13   < > 14   CREATININE 1.3   < > 1.0   CALCIUM 10.0   < > 10.1   MG 1.9  --   --     < > = values in this interval not displayed.     CBC:   Recent Labs   Lab 11/05/20 0410   WBC 8.09   HGB 10.1*   HCT 34.3*   *

## 2020-11-06 NOTE — ASSESSMENT & PLAN NOTE
Was walking with walker prior to pneumonia/bacteremia admit last month then after d.c was only w/c bound (gonzalez round) will add pt here and try and get her as strong as poss as she lives at home with family.   10/23 Chronic co back ache but also has recent high ankle fx ; per EDDIE Vinson NP Spoke with Ariela VASQUES who will see patient while on SWING. She looked over x rays and hx and reports that patient can weight bear as tolerated. Nurse/OT/MD notified    · 10/26 Supine to Sit: maximal assistance, of 2 persons and patient able to reach with UE to push through rail and with improved push off with UE and initiation  · Sit to Supine: maximal assistance of 2 people with improved ability of patient to control descent of upper body  · Transfers:     · Sit to Stand:  moderate assistance, maximal assistance of 2 persons with no AD.  PT and PT tech blocking both knees while assisting pt to elevate hips from bed  Balance: pt able to stand x15 seconds EOB limited largely by knee and back pain.     10/28.  · Bed Mobility:     · Rolling Left:  moderate assistance  · Rolling Right: moderate assistance  · Scooting: maximal assistance  · Supine to Sit: maximum/moderate assistace and cues  · Sit to Supine: maximum assistace x 1-2 and cues  · Transfers:     · Sit to Stand:  maximum assistance and cues inside the parallel bars with facilitation tech  · Bed to Chair: to wheelchair  with  maximum assistance and cues  using  Slide Board  · Balance: Standing Static inside the parallel bars with Poor grade. Tolerated for ~10 seconds with 2 attempts with  Maximum assistance and cues.  10/30 Standing with RW Static: Poor for ~15 seconds tolerance with max Assistance and cues  11/2  wheelchair maximum assistance x 2 and cues  with  slide board  using  slide/scoot tech  11/4 Sit to Stand:  Max/moderate assistance and cues with standard walker  Balance: Static Stand with Std Walker: Fair- with 2 mins and 7 seconds  tolerance and  max/moderate assistance and cues  · 11/6 max/moderate assistance and cues  with standard walker  Balance: Standing Static with std walker: Poor+ for 1 minute and 15 seconds(1st attempt); 45 seconds(2nd attempt)

## 2020-11-07 LAB
ANION GAP SERPL CALC-SCNC: 11 MMOL/L (ref 8–16)
BUN SERPL-MCNC: 13 MG/DL (ref 8–23)
CALCIUM SERPL-MCNC: 10.3 MG/DL (ref 8.7–10.5)
CHLORIDE SERPL-SCNC: 100 MMOL/L (ref 95–110)
CO2 SERPL-SCNC: 28 MMOL/L (ref 23–29)
CREAT SERPL-MCNC: 1.1 MG/DL (ref 0.5–1.4)
EST. GFR  (AFRICAN AMERICAN): 58 ML/MIN/1.73 M^2
EST. GFR  (NON AFRICAN AMERICAN): 50 ML/MIN/1.73 M^2
GLUCOSE SERPL-MCNC: 102 MG/DL (ref 70–110)
POTASSIUM SERPL-SCNC: 4 MMOL/L (ref 3.5–5.1)
SODIUM SERPL-SCNC: 139 MMOL/L (ref 136–145)
VANCOMYCIN SERPL-MCNC: 16.3 UG/ML

## 2020-11-07 PROCEDURE — 36415 COLL VENOUS BLD VENIPUNCTURE: CPT

## 2020-11-07 PROCEDURE — 94640 AIRWAY INHALATION TREATMENT: CPT

## 2020-11-07 PROCEDURE — 27000221 HC OXYGEN, UP TO 24 HOURS

## 2020-11-07 PROCEDURE — 80202 ASSAY OF VANCOMYCIN: CPT

## 2020-11-07 PROCEDURE — 25000003 PHARM REV CODE 250: Performed by: INTERNAL MEDICINE

## 2020-11-07 PROCEDURE — 11000004 HC SNF PRIVATE

## 2020-11-07 PROCEDURE — 25000003 PHARM REV CODE 250: Performed by: NURSE PRACTITIONER

## 2020-11-07 PROCEDURE — A4216 STERILE WATER/SALINE, 10 ML: HCPCS | Performed by: INTERNAL MEDICINE

## 2020-11-07 PROCEDURE — 25000242 PHARM REV CODE 250 ALT 637 W/ HCPCS: Performed by: NURSE PRACTITIONER

## 2020-11-07 PROCEDURE — 63600175 PHARM REV CODE 636 W HCPCS: Performed by: FAMILY MEDICINE

## 2020-11-07 PROCEDURE — 97530 THERAPEUTIC ACTIVITIES: CPT

## 2020-11-07 PROCEDURE — 80048 BASIC METABOLIC PNL TOTAL CA: CPT

## 2020-11-07 PROCEDURE — 25000003 PHARM REV CODE 250: Performed by: FAMILY MEDICINE

## 2020-11-07 PROCEDURE — 94799 UNLISTED PULMONARY SVC/PX: CPT

## 2020-11-07 PROCEDURE — 94761 N-INVAS EAR/PLS OXIMETRY MLT: CPT

## 2020-11-07 RX ADMIN — FUROSEMIDE 40 MG: 40 TABLET ORAL at 09:11

## 2020-11-07 RX ADMIN — GUAIFENESIN 600 MG: 600 TABLET, EXTENDED RELEASE ORAL at 08:11

## 2020-11-07 RX ADMIN — Medication 10 ML: at 05:11

## 2020-11-07 RX ADMIN — Medication 10 ML: at 12:11

## 2020-11-07 RX ADMIN — HYDROCODONE BITARTRATE AND ACETAMINOPHEN 1 TABLET: 10; 325 TABLET ORAL at 03:11

## 2020-11-07 RX ADMIN — MEMANTINE 10 MG: 10 TABLET ORAL at 08:11

## 2020-11-07 RX ADMIN — ACETAMINOPHEN 500 MG: 500 TABLET, FILM COATED ORAL at 08:11

## 2020-11-07 RX ADMIN — METHOCARBAMOL TABLETS 500 MG: 500 TABLET, COATED ORAL at 12:11

## 2020-11-07 RX ADMIN — IPRATROPIUM BROMIDE AND ALBUTEROL SULFATE 3 ML: .5; 3 SOLUTION RESPIRATORY (INHALATION) at 07:11

## 2020-11-07 RX ADMIN — ASPIRIN 81 MG: 81 TABLET, COATED ORAL at 09:11

## 2020-11-07 RX ADMIN — Medication 10 ML: at 11:11

## 2020-11-07 RX ADMIN — METHOCARBAMOL TABLETS 500 MG: 500 TABLET, COATED ORAL at 08:11

## 2020-11-07 RX ADMIN — IPRATROPIUM BROMIDE AND ALBUTEROL SULFATE 3 ML: .5; 3 SOLUTION RESPIRATORY (INHALATION) at 01:11

## 2020-11-07 RX ADMIN — DONEPEZIL HYDROCHLORIDE 10 MG: 5 TABLET, FILM COATED ORAL at 08:11

## 2020-11-07 RX ADMIN — METHOCARBAMOL TABLETS 500 MG: 500 TABLET, COATED ORAL at 09:11

## 2020-11-07 RX ADMIN — DULOXETINE 60 MG: 30 CAPSULE, DELAYED RELEASE ORAL at 08:11

## 2020-11-07 RX ADMIN — MICONAZOLE NITRATE: 20 OINTMENT TOPICAL at 09:11

## 2020-11-07 RX ADMIN — CALCIUM CARBONATE (ANTACID) CHEW TAB 500 MG 500 MG: 500 CHEW TAB at 10:11

## 2020-11-07 RX ADMIN — VANCOMYCIN HYDROCHLORIDE 750 MG: 750 INJECTION, POWDER, LYOPHILIZED, FOR SOLUTION INTRAVENOUS at 01:11

## 2020-11-07 RX ADMIN — LISINOPRIL 10 MG: 10 TABLET ORAL at 09:11

## 2020-11-07 RX ADMIN — CLOPIDOGREL 75 MG: 75 TABLET, FILM COATED ORAL at 09:11

## 2020-11-07 RX ADMIN — HYDROCODONE BITARTRATE AND ACETAMINOPHEN 1 TABLET: 10; 325 TABLET ORAL at 09:11

## 2020-11-07 RX ADMIN — METHOCARBAMOL TABLETS 500 MG: 500 TABLET, COATED ORAL at 05:11

## 2020-11-07 RX ADMIN — MICONAZOLE NITRATE: 20 OINTMENT TOPICAL at 08:11

## 2020-11-07 RX ADMIN — METOPROLOL SUCCINATE 50 MG: 50 TABLET, EXTENDED RELEASE ORAL at 08:11

## 2020-11-07 RX ADMIN — PRAVASTATIN SODIUM 40 MG: 40 TABLET ORAL at 08:11

## 2020-11-07 RX ADMIN — ISOSORBIDE MONONITRATE 60 MG: 60 TABLET, EXTENDED RELEASE ORAL at 08:11

## 2020-11-07 RX ADMIN — HYDROCODONE BITARTRATE AND ACETAMINOPHEN 1 TABLET: 10; 325 TABLET ORAL at 08:11

## 2020-11-07 RX ADMIN — PANTOPRAZOLE SODIUM 40 MG: 40 TABLET, DELAYED RELEASE ORAL at 05:11

## 2020-11-07 RX ADMIN — FERROUS SULFATE TAB 325 MG (65 MG ELEMENTAL FE) 325 MG: 325 (65 FE) TAB at 08:11

## 2020-11-07 NOTE — PLAN OF CARE
Patient admitted for SWING/long-term antibiotics.  Discharge date planned is 12/3/20.  Patient on IV Vancomycin.  Dose held yesterday due to elevated trough.  Random level drawn today and results showed 16.3.  Started back on Vancomycin today, will repeat trough 11/9/20 at 1230.  Potassium good at 4.0, stable after supplementing yesterday.   PICC line in place, one lumen unable to flush, second lumen patent with blood return.  Patient non-ambulatory, working with PT, up to side of bed today.  To prevent skin breakdown further, purewick in place, turn Q2.  Patient refuses heel lift protectors.  Norco used PRN for pain control.  Plans to continue antibiotics, will repeat MRI Monday to confirm status of spinal abscesses.

## 2020-11-07 NOTE — PROGRESS NOTES
Pharmacokinetic Assessment Follow Up: IV Vancomycin    Vancomycin serum concentration assessment(s):    The random level was drawn correctly and can be used to guide therapy at this time. The measurement is within the desired definitive target range of 15 to 20 mcg/mL.    Vancomycin Regimen Plan:    Change regimen to Vancomycin 750 mg IV every 24 hours with next serum trough concentration measured at 1230 prior to 3rd dose on 11/9/20    Drug levels (last 3 results):  Recent Labs   Lab Result Units 11/05/20  2332 11/07/20  1157   Vancomycin, Random ug/mL  --  16.3   Vancomycin-Trough ug/mL 21.1  --        Pharmacy will continue to follow and monitor vancomycin.    Please contact pharmacy at extension [phone] for questions regarding this assessment.    Thank you for the consult,   Madai Fine, PharmD       Patient brief summary:  Martina Betancourt is a 70 y.o. female initiated on antimicrobial therapy with IV Vancomycin for treatment of bone/joint infection    The patient's current regimen is 1000mg iv q24h     Drug Allergies:   Review of patient's allergies indicates:   Allergen Reactions    Hydroxyzine hcl     Tizanidine        Actual Body Weight:   131.5kg    Renal Function:   Estimated Creatinine Clearance: 68.4 mL/min (based on SCr of 1.1 mg/dL).,     Dialysis Method (if applicable):  N/A    CBC (last 72 hours):  Recent Labs   Lab Result Units 11/05/20  0410   WBC K/uL 8.09   Hemoglobin g/dL 10.1*   Hematocrit % 34.3*   Platelets K/uL 423*   Gran % % 57.5   Lymph % % 26.1   Mono % % 11.5   Eosinophil % % 3.8   Basophil % % 0.6   Differential Method  Automated       Metabolic Panel (last 72 hours):  Recent Labs   Lab Result Units 11/05/20  0410 11/05/20  2332 11/06/20  0550 11/07/20  0617   Sodium mmol/L 139 140 140 139   Potassium mmol/L 3.4* 3.5 3.4* 4.0   Chloride mmol/L 98 101 101 100   CO2 mmol/L 30* 27 28 28   Glucose mg/dL 95 98 96 102   BUN mg/dL 13 14 14 13   Creatinine mg/dL 1.3 1.1 1.0 1.1    Magnesium mg/dL 1.9  --   --   --    Phosphorus mg/dL 4.7*  --   --   --        Vancomycin Administrations:  vancomycin given in the last 96 hours                   vancomycin in dextrose 5 % 1 gram/250 mL IVPB 1,000 mg (mg) 1,000 mg New Bag 11/06/20 0006     1,000 mg New Bag 11/04/20 2345     1,000 mg New Bag 11/03/20 2339                Microbiologic Results:  Microbiology Results (last 7 days)     ** No results found for the last 168 hours. **

## 2020-11-07 NOTE — PT/OT/SLP PROGRESS
Physical Therapy Treatment    Patient Name:  Martina Betancourt   MRN:  [id number]    Recommendations:     Discharge Recommendations:  nursing facility, basic   Discharge Equipment Recommendations: lift device, hospital bed   Barriers to discharge: Inaccessible home and Decreased caregiver support    Assessment:     Martina Betancourt is a 70 y.o. female admitted with a medical diagnosis of Debility.  She presents with the following impairments/functional limitations:  weakness, impaired endurance, impaired self care skills, impaired functional mobilty, gait instability, impaired balance, decreased upper extremity function, decreased lower extremity function, decreased safety awareness, pain, edema.  Pt displayed decreased toleration of PT treatment session due to diffuse lumbar pain that significantly decreased her independence and endurance with transfer tasks.    Rehab Prognosis: Poor; patient would benefit from acute skilled PT services to address these deficits and reach maximum level of function.    Recent Surgery: * No surgery found *      Plan:     During this hospitalization, patient to be seen daily to address the identified rehab impairments via gait training, therapeutic activities, therapeutic exercises and progress toward the following goals:    · Plan of Care Expires:  11/10/20    Subjective     Chief Complaint: pain  Patient/Family Comments/goals: decrease pain  Pain/Comfort:  · Pain Rating 1: 10/10  · Location - Side 1: Right  · Location - Orientation 1: lower  · Location 1: back  · Pain Addressed 1: Reposition, Nurse notified, Pre-medicate for activity  · Pain Rating Post-Intervention 1: 9/10      Objective:     Communicated with pt prior to session.  Patient found supine with PureWick, peripheral IV, oxygen upon PT entry to room.     General Precautions: Standard, fall   Orthopedic Precautions:N/A   Braces: N/A     Functional Mobility:  · Bed Mobility:     · Rolling Left:  contact guard  assistance  · Rolling Right: contact guard assistance  · Scooting: contact guard assistance  · Supine to Sit: contact guard assistance  · Sit to Supine: minimum assistance  · Transfers:     · Sit to Stand:  moderate assistance and maximal assistance with no AD and rolling walker      AM-PAC 6 CLICK MOBILITY  Turning over in bed (including adjusting bedclothes, sheets and blankets)?: 3  Sitting down on and standing up from a chair with arms (e.g., wheelchair, bedside commode, etc.): 2  Moving from lying on back to sitting on the side of the bed?: 3  Moving to and from a bed to a chair (including a wheelchair)?: 2  Need to walk in hospital room?: 1  Climbing 3-5 steps with a railing?: 1  Basic Mobility Total Score: 12       Therapeutic Activities and Exercises:   Rolling L/R x 2  LE management x 4 min  Scooting in sitting x 10  Sit to stand x 12  Static standing 3 x 30 sec  Scooting in supine x 7    Patient left supine with all lines intact and call button in reach..    GOALS:   Multidisciplinary Problems     Physical Therapy Goals        Problem: Physical Therapy Goal    Goal Priority Disciplines Outcome Goal Variances Interventions   Physical Therapy Goal     PT, PT/OT Ongoing, Progressing     Description: Goals to be met by: 11/10/20    Patient will increase functional independence with mobility by performin. Rolling to sides using bed rail with contact guard assistance and cues  2. Supine to sit with minimal assistance and cues  3. Sit to supine with moderate assistance and cues  4. Tolerate Static Sit at side of the bed for 10 minutes with Standby Assistance  5. Bed to chair transfer with moderate assistance and cues using Slide Board TECHNIQUE  6. Lower extremity exercise program x15 reps per handout, with assistance as needed                      Time Tracking:     PT Received On: 20  PT Start Time: 900     PT Stop Time: 925  PT Total Time (min): 25 min     Billable Minutes: Therapeutic Activity  23    Treatment Type: Treatment  PT/PTA: PT     PTA Visit Number: 0     Bobby Mohan, PT  11/07/2020

## 2020-11-07 NOTE — PLAN OF CARE
Patient Agrees and Compliant with Plan of Care: Swing Patient.  Monitor Vital Signs; Vital signs stable this shift.  Monitor Breathing Pattern; Bilateral Breath sounds auscultated posteriorly early this shift with diminished sounds and fine crackles LLL. Oxygen at 3L nc maintaining oxygen saturation of 94-95%.  Administer IV Antibiotics as ordered; Patient on Vancomycin Q 24 hrs. Note dose was held this shift due to elevated Vancomycin trough. Vancomycin trough to be repeated this am.  Maintain Pain Regimen; Hydrocodone given X 1 this shift for c/o back pain with relief.  Maintain PICC line in upper right arm; Note only one port flushing.  Monitor for Nausea/ Vomiting; Patient did not c/o any nausea or emesis this shift.  Maintain I/O's; Patient has Purwick . Note Purwick displaced periodically .  Daily weight.   Maintain Skin integrity; Note inner aspect of upper thighs red; Area cleaned and Antifungal Barrier cream to area. Note no redness to folds of abdomen and under breast. Pressure area to right heel . Heels floating off bed. Encouraged patient to turn self:  Patient stated she did not get up with PT yesterday because she was not feeling well.   Fall Precautions; Maintain Patient Safety; Bed Alarm in use. No falls or injury noted this shift.

## 2020-11-08 LAB
ANION GAP SERPL CALC-SCNC: 11 MMOL/L (ref 8–16)
BUN SERPL-MCNC: 14 MG/DL (ref 8–23)
CALCIUM SERPL-MCNC: 10.3 MG/DL (ref 8.7–10.5)
CHLORIDE SERPL-SCNC: 99 MMOL/L (ref 95–110)
CO2 SERPL-SCNC: 29 MMOL/L (ref 23–29)
CREAT SERPL-MCNC: 1 MG/DL (ref 0.5–1.4)
EST. GFR  (AFRICAN AMERICAN): >60 ML/MIN/1.73 M^2
EST. GFR  (NON AFRICAN AMERICAN): 56 ML/MIN/1.73 M^2
GLUCOSE SERPL-MCNC: 98 MG/DL (ref 70–110)
POTASSIUM SERPL-SCNC: 4.1 MMOL/L (ref 3.5–5.1)
SODIUM SERPL-SCNC: 139 MMOL/L (ref 136–145)

## 2020-11-08 PROCEDURE — 25000003 PHARM REV CODE 250: Performed by: INTERNAL MEDICINE

## 2020-11-08 PROCEDURE — 80048 BASIC METABOLIC PNL TOTAL CA: CPT

## 2020-11-08 PROCEDURE — 25000242 PHARM REV CODE 250 ALT 637 W/ HCPCS: Performed by: NURSE PRACTITIONER

## 2020-11-08 PROCEDURE — 94761 N-INVAS EAR/PLS OXIMETRY MLT: CPT

## 2020-11-08 PROCEDURE — 25000003 PHARM REV CODE 250: Performed by: NURSE PRACTITIONER

## 2020-11-08 PROCEDURE — 63600175 PHARM REV CODE 636 W HCPCS: Performed by: FAMILY MEDICINE

## 2020-11-08 PROCEDURE — 99900035 HC TECH TIME PER 15 MIN (STAT)

## 2020-11-08 PROCEDURE — 94640 AIRWAY INHALATION TREATMENT: CPT

## 2020-11-08 PROCEDURE — 97530 THERAPEUTIC ACTIVITIES: CPT

## 2020-11-08 PROCEDURE — 36415 COLL VENOUS BLD VENIPUNCTURE: CPT

## 2020-11-08 PROCEDURE — 11000004 HC SNF PRIVATE

## 2020-11-08 PROCEDURE — 27000221 HC OXYGEN, UP TO 24 HOURS

## 2020-11-08 PROCEDURE — 25000003 PHARM REV CODE 250: Performed by: FAMILY MEDICINE

## 2020-11-08 PROCEDURE — 94799 UNLISTED PULMONARY SVC/PX: CPT

## 2020-11-08 PROCEDURE — A4216 STERILE WATER/SALINE, 10 ML: HCPCS | Performed by: INTERNAL MEDICINE

## 2020-11-08 RX ADMIN — CLOPIDOGREL 75 MG: 75 TABLET, FILM COATED ORAL at 08:11

## 2020-11-08 RX ADMIN — METHOCARBAMOL TABLETS 500 MG: 500 TABLET, COATED ORAL at 01:11

## 2020-11-08 RX ADMIN — Medication 10 ML: at 06:11

## 2020-11-08 RX ADMIN — MICONAZOLE NITRATE: 20 OINTMENT TOPICAL at 08:11

## 2020-11-08 RX ADMIN — METOPROLOL SUCCINATE 50 MG: 50 TABLET, EXTENDED RELEASE ORAL at 08:11

## 2020-11-08 RX ADMIN — FERROUS SULFATE TAB 325 MG (65 MG ELEMENTAL FE) 325 MG: 325 (65 FE) TAB at 08:11

## 2020-11-08 RX ADMIN — GUAIFENESIN 600 MG: 600 TABLET, EXTENDED RELEASE ORAL at 08:11

## 2020-11-08 RX ADMIN — IPRATROPIUM BROMIDE AND ALBUTEROL SULFATE 3 ML: .5; 3 SOLUTION RESPIRATORY (INHALATION) at 07:11

## 2020-11-08 RX ADMIN — PRAVASTATIN SODIUM 40 MG: 40 TABLET ORAL at 08:11

## 2020-11-08 RX ADMIN — ASPIRIN 81 MG: 81 TABLET, COATED ORAL at 08:11

## 2020-11-08 RX ADMIN — METHOCARBAMOL TABLETS 500 MG: 500 TABLET, COATED ORAL at 08:11

## 2020-11-08 RX ADMIN — HYDROCODONE BITARTRATE AND ACETAMINOPHEN 1 TABLET: 10; 325 TABLET ORAL at 07:11

## 2020-11-08 RX ADMIN — ACETAMINOPHEN 500 MG: 500 TABLET, FILM COATED ORAL at 08:11

## 2020-11-08 RX ADMIN — LISINOPRIL 10 MG: 10 TABLET ORAL at 08:11

## 2020-11-08 RX ADMIN — DONEPEZIL HYDROCHLORIDE 10 MG: 5 TABLET, FILM COATED ORAL at 08:11

## 2020-11-08 RX ADMIN — Medication 10 ML: at 12:11

## 2020-11-08 RX ADMIN — VANCOMYCIN HYDROCHLORIDE 750 MG: 750 INJECTION, POWDER, LYOPHILIZED, FOR SOLUTION INTRAVENOUS at 01:11

## 2020-11-08 RX ADMIN — FUROSEMIDE 40 MG: 40 TABLET ORAL at 08:11

## 2020-11-08 RX ADMIN — METHOCARBAMOL TABLETS 500 MG: 500 TABLET, COATED ORAL at 04:11

## 2020-11-08 RX ADMIN — PANTOPRAZOLE SODIUM 40 MG: 40 TABLET, DELAYED RELEASE ORAL at 06:11

## 2020-11-08 RX ADMIN — DULOXETINE 60 MG: 30 CAPSULE, DELAYED RELEASE ORAL at 08:11

## 2020-11-08 RX ADMIN — Medication 10 ML: at 11:11

## 2020-11-08 RX ADMIN — Medication 10 ML: at 07:11

## 2020-11-08 RX ADMIN — ACETAMINOPHEN 500 MG: 500 TABLET, FILM COATED ORAL at 02:11

## 2020-11-08 RX ADMIN — MEMANTINE 10 MG: 10 TABLET ORAL at 08:11

## 2020-11-08 RX ADMIN — ISOSORBIDE MONONITRATE 60 MG: 60 TABLET, EXTENDED RELEASE ORAL at 08:11

## 2020-11-08 RX ADMIN — IPRATROPIUM BROMIDE AND ALBUTEROL SULFATE 3 ML: .5; 3 SOLUTION RESPIRATORY (INHALATION) at 01:11

## 2020-11-08 NOTE — PLAN OF CARE
Patient Agrees and Compliant with Plan of Care: Swing Patient.  Maintain Pain Regimen; Hydrocodone X 1 given for c/o lower back pain with relief.  Monitor Breathing Pattern; Bilateral Breath sounds auscultated posteriorly early this shift with diminished Breath sounds and fine crackles bilateral lower lobes. Patient on O2 at 3L nc with oxygen saturation of 94-97% . No c/o shortness of breath noted .  Administer IV Antibiotic as ordered; Patient receiving Vancomycin 750mg Daily. Next Vancomycin trough to be done on 11/9120 at 1230PM.  MRI of Spine ordered scheduled to be done on 11-09-20 ?  Monitor Lab Values; Most recent Potassium level 4.0.  Maintain I/O's; Patient has Purwick working off and on . Monitoring I/O's .  Daily weight. 131.2kg  Maintain Skin Integrity; Note redness to inner aspect of thighs; Area cleaned well and Antifungal/Barrier cream at bedside used. Note folds to abd. And skin under breast healing well. Small pressure area to right heel. Open to air.Heels up off bed supported on pillows.  Patient encouraged to get up with PT.  Fall Precautions; Maintain Patient Safety; No falls or injury noted this shift.

## 2020-11-08 NOTE — PT/OT/SLP PROGRESS
Physical Therapy Treatment    Patient Name:  Martina Betancourt   MRN:  [id number]    Recommendations:     Discharge Recommendations:  nursing facility, basic   Discharge Equipment Recommendations: hospital bed, lift device   Barriers to discharge: Inaccessible home and Decreased caregiver support    Assessment:     Martina Betancourt is a 70 y.o. female admitted with a medical diagnosis of Debility.  She presents with the following impairments/functional limitations:  weakness, impaired endurance, impaired self care skills, impaired functional mobilty, gait instability, impaired balance, decreased lower extremity function, decreased upper extremity function, decreased safety awareness, pain, edema.  Pt tolerated treatment session and displayed increased endurance with static standing, but afterward pt had increased pain that ceased session.    Rehab Prognosis: Poor; patient would benefit from acute skilled PT services to address these deficits and reach maximum level of function.    Recent Surgery: * No surgery found *      Plan:     During this hospitalization, patient to be seen daily to address the identified rehab impairments via gait training, therapeutic activities, therapeutic exercises and progress toward the following goals:    · Plan of Care Expires:  11/10/20    Subjective     Chief Complaint: pain  Patient/Family Comments/goals: decrease pain  Pain/Comfort:  · Pain Rating 1: 10/10  · Location - Side 1: Right  · Location - Orientation 1: lower  · Location 1: back  · Pain Addressed 1: Reposition, Pre-medicate for activity  · Pain Rating Post-Intervention 1: 8/10      Objective:     Communicated with pt prior to session.  Patient found supine with PureWick, peripheral IV, oxygen upon PT entry to room.     General Precautions: Standard, fall   Orthopedic Precautions:N/A   Braces: N/A     Functional Mobility:  · Bed Mobility:     · Rolling Left:  stand by assistance  · Rolling Right: stand by  assistance  · Scooting: stand by assistance  · Supine to Sit: contact guard assistance  · Sit to Supine: minimum assistance  · Transfers:     · Sit to Stand:  moderate assistance with rolling walker      AM-PAC 6 CLICK MOBILITY  Turning over in bed (including adjusting bedclothes, sheets and blankets)?: 3  Sitting down on and standing up from a chair with arms (e.g., wheelchair, bedside commode, etc.): 2  Moving from lying on back to sitting on the side of the bed?: 3  Moving to and from a bed to a chair (including a wheelchair)?: 2  Need to walk in hospital room?: 1  Climbing 3-5 steps with a railing?: 1  Basic Mobility Total Score: 12       Therapeutic Activities and Exercises:   Supine to sit x 2  Scooting in sitting x 6  Sit to stand x 8  Static standing x 1 min    Patient left supine with all lines intact and call button in reach..    GOALS:   Multidisciplinary Problems     Physical Therapy Goals        Problem: Physical Therapy Goal    Goal Priority Disciplines Outcome Goal Variances Interventions   Physical Therapy Goal     PT, PT/OT Ongoing, Progressing     Description: Goals to be met by: 11/10/20    Patient will increase functional independence with mobility by performin. Rolling to sides using bed rail with contact guard assistance and cues  2. Supine to sit with minimal assistance and cues  3. Sit to supine with moderate assistance and cues  4. Tolerate Static Sit at side of the bed for 10 minutes with Standby Assistance  5. Bed to chair transfer with moderate assistance and cues using Slide Board TECHNIQUE  6. Lower extremity exercise program x15 reps per handout, with assistance as needed                      Time Tracking:     PT Received On: 20  PT Start Time: 0855     PT Stop Time: 0910  PT Total Time (min): 15 min     Billable Minutes: Therapeutic Activity 15    Treatment Type: Treatment  PT/PTA: PT     PTA Visit Number: 0     Bobby Mohan, PT  2020

## 2020-11-09 LAB
ANION GAP SERPL CALC-SCNC: 9 MMOL/L (ref 8–16)
BASOPHILS # BLD AUTO: 0.03 K/UL (ref 0–0.2)
BASOPHILS NFR BLD: 0.4 % (ref 0–1.9)
BUN SERPL-MCNC: 13 MG/DL (ref 8–23)
CALCIUM SERPL-MCNC: 10.3 MG/DL (ref 8.7–10.5)
CHLORIDE SERPL-SCNC: 99 MMOL/L (ref 95–110)
CO2 SERPL-SCNC: 32 MMOL/L (ref 23–29)
CREAT SERPL-MCNC: 1 MG/DL (ref 0.5–1.4)
DIFFERENTIAL METHOD: ABNORMAL
EOSINOPHIL # BLD AUTO: 0.3 K/UL (ref 0–0.5)
EOSINOPHIL NFR BLD: 4.4 % (ref 0–8)
ERYTHROCYTE [DISTWIDTH] IN BLOOD BY AUTOMATED COUNT: 16 % (ref 11.5–14.5)
EST. GFR  (AFRICAN AMERICAN): >60 ML/MIN/1.73 M^2
EST. GFR  (NON AFRICAN AMERICAN): 56 ML/MIN/1.73 M^2
GLUCOSE SERPL-MCNC: 90 MG/DL (ref 70–110)
HCT VFR BLD AUTO: 33.6 % (ref 37–48.5)
HGB BLD-MCNC: 9.9 G/DL (ref 12–16)
IMM GRANULOCYTES # BLD AUTO: 0.03 K/UL (ref 0–0.04)
IMM GRANULOCYTES NFR BLD AUTO: 0.4 % (ref 0–0.5)
LYMPHOCYTES # BLD AUTO: 2.2 K/UL (ref 1–4.8)
LYMPHOCYTES NFR BLD: 30 % (ref 18–48)
MAGNESIUM SERPL-MCNC: 1.9 MG/DL (ref 1.6–2.6)
MCH RBC QN AUTO: 26.8 PG (ref 27–31)
MCHC RBC AUTO-ENTMCNC: 29.5 G/DL (ref 32–36)
MCV RBC AUTO: 91 FL (ref 82–98)
MONOCYTES # BLD AUTO: 0.9 K/UL (ref 0.3–1)
MONOCYTES NFR BLD: 12.7 % (ref 4–15)
NEUTROPHILS # BLD AUTO: 3.8 K/UL (ref 1.8–7.7)
NEUTROPHILS NFR BLD: 52.1 % (ref 38–73)
NRBC BLD-RTO: 0 /100 WBC
PHOSPHATE SERPL-MCNC: 3.7 MG/DL (ref 2.7–4.5)
PLATELET # BLD AUTO: 391 K/UL (ref 150–350)
PMV BLD AUTO: 9.1 FL (ref 9.2–12.9)
POTASSIUM SERPL-SCNC: 3.7 MMOL/L (ref 3.5–5.1)
RBC # BLD AUTO: 3.7 M/UL (ref 4–5.4)
SODIUM SERPL-SCNC: 140 MMOL/L (ref 136–145)
VANCOMYCIN TROUGH SERPL-MCNC: 16.5 UG/ML (ref 10–22)
WBC # BLD AUTO: 7.24 K/UL (ref 3.9–12.7)

## 2020-11-09 PROCEDURE — 85025 COMPLETE CBC W/AUTO DIFF WBC: CPT

## 2020-11-09 PROCEDURE — 94640 AIRWAY INHALATION TREATMENT: CPT

## 2020-11-09 PROCEDURE — 80202 ASSAY OF VANCOMYCIN: CPT

## 2020-11-09 PROCEDURE — 25500020 PHARM REV CODE 255: Performed by: FAMILY MEDICINE

## 2020-11-09 PROCEDURE — 25000003 PHARM REV CODE 250: Performed by: INTERNAL MEDICINE

## 2020-11-09 PROCEDURE — 25000003 PHARM REV CODE 250: Performed by: NURSE PRACTITIONER

## 2020-11-09 PROCEDURE — 99309 PR NURSING FAC CARE, SUBSEQ, SIGNIF COMPLIC: ICD-10-PCS | Mod: ,,, | Performed by: INTERNAL MEDICINE

## 2020-11-09 PROCEDURE — A9585 GADOBUTROL INJECTION: HCPCS | Performed by: FAMILY MEDICINE

## 2020-11-09 PROCEDURE — 83735 ASSAY OF MAGNESIUM: CPT

## 2020-11-09 PROCEDURE — 80048 BASIC METABOLIC PNL TOTAL CA: CPT

## 2020-11-09 PROCEDURE — 84100 ASSAY OF PHOSPHORUS: CPT

## 2020-11-09 PROCEDURE — 36415 COLL VENOUS BLD VENIPUNCTURE: CPT

## 2020-11-09 PROCEDURE — 99309 SBSQ NF CARE MODERATE MDM 30: CPT | Mod: ,,, | Performed by: INTERNAL MEDICINE

## 2020-11-09 PROCEDURE — 97530 THERAPEUTIC ACTIVITIES: CPT

## 2020-11-09 PROCEDURE — A4216 STERILE WATER/SALINE, 10 ML: HCPCS | Performed by: INTERNAL MEDICINE

## 2020-11-09 PROCEDURE — 25000242 PHARM REV CODE 250 ALT 637 W/ HCPCS: Performed by: NURSE PRACTITIONER

## 2020-11-09 PROCEDURE — 63600175 PHARM REV CODE 636 W HCPCS: Performed by: FAMILY MEDICINE

## 2020-11-09 PROCEDURE — 97110 THERAPEUTIC EXERCISES: CPT

## 2020-11-09 PROCEDURE — 25000003 PHARM REV CODE 250: Performed by: FAMILY MEDICINE

## 2020-11-09 PROCEDURE — 63600175 PHARM REV CODE 636 W HCPCS: Performed by: INTERNAL MEDICINE

## 2020-11-09 PROCEDURE — 94761 N-INVAS EAR/PLS OXIMETRY MLT: CPT

## 2020-11-09 PROCEDURE — 11000004 HC SNF PRIVATE

## 2020-11-09 PROCEDURE — 27000221 HC OXYGEN, UP TO 24 HOURS

## 2020-11-09 PROCEDURE — 94799 UNLISTED PULMONARY SVC/PX: CPT

## 2020-11-09 RX ORDER — HEPARIN 100 UNIT/ML
5 SYRINGE INTRAVENOUS ONCE
Status: COMPLETED | OUTPATIENT
Start: 2020-11-09 | End: 2020-11-09

## 2020-11-09 RX ORDER — GADOBUTROL 604.72 MG/ML
10 INJECTION INTRAVENOUS
Status: COMPLETED | OUTPATIENT
Start: 2020-11-09 | End: 2020-11-09

## 2020-11-09 RX ADMIN — IPRATROPIUM BROMIDE AND ALBUTEROL SULFATE 3 ML: .5; 3 SOLUTION RESPIRATORY (INHALATION) at 07:11

## 2020-11-09 RX ADMIN — MEMANTINE 10 MG: 10 TABLET ORAL at 08:11

## 2020-11-09 RX ADMIN — METHOCARBAMOL TABLETS 500 MG: 500 TABLET, COATED ORAL at 08:11

## 2020-11-09 RX ADMIN — HEPARIN 500 UNITS: 100 SYRINGE at 01:11

## 2020-11-09 RX ADMIN — HYDROCODONE BITARTRATE AND ACETAMINOPHEN 1 TABLET: 10; 325 TABLET ORAL at 06:11

## 2020-11-09 RX ADMIN — Medication 10 ML: at 06:11

## 2020-11-09 RX ADMIN — VANCOMYCIN HYDROCHLORIDE 750 MG: 750 INJECTION, POWDER, LYOPHILIZED, FOR SOLUTION INTRAVENOUS at 01:11

## 2020-11-09 RX ADMIN — GADOBUTROL 10 ML: 604.72 INJECTION INTRAVENOUS at 12:11

## 2020-11-09 RX ADMIN — ISOSORBIDE MONONITRATE 60 MG: 60 TABLET, EXTENDED RELEASE ORAL at 08:11

## 2020-11-09 RX ADMIN — DONEPEZIL HYDROCHLORIDE 10 MG: 5 TABLET, FILM COATED ORAL at 08:11

## 2020-11-09 RX ADMIN — GUAIFENESIN 600 MG: 600 TABLET, EXTENDED RELEASE ORAL at 08:11

## 2020-11-09 RX ADMIN — CLOPIDOGREL 75 MG: 75 TABLET, FILM COATED ORAL at 08:11

## 2020-11-09 RX ADMIN — PANTOPRAZOLE SODIUM 40 MG: 40 TABLET, DELAYED RELEASE ORAL at 05:11

## 2020-11-09 RX ADMIN — MELATONIN TAB 3 MG 6 MG: 3 TAB at 08:11

## 2020-11-09 RX ADMIN — METHOCARBAMOL TABLETS 500 MG: 500 TABLET, COATED ORAL at 04:11

## 2020-11-09 RX ADMIN — ASPIRIN 81 MG: 81 TABLET, COATED ORAL at 08:11

## 2020-11-09 RX ADMIN — ACETAMINOPHEN 500 MG: 500 TABLET, FILM COATED ORAL at 08:11

## 2020-11-09 RX ADMIN — MICONAZOLE NITRATE: 20 OINTMENT TOPICAL at 08:11

## 2020-11-09 RX ADMIN — HEPARIN 500 UNITS: 100 SYRINGE at 02:11

## 2020-11-09 RX ADMIN — METOPROLOL SUCCINATE 50 MG: 50 TABLET, EXTENDED RELEASE ORAL at 08:11

## 2020-11-09 RX ADMIN — HYDROCODONE BITARTRATE AND ACETAMINOPHEN 1 TABLET: 10; 325 TABLET ORAL at 12:11

## 2020-11-09 RX ADMIN — ACETAMINOPHEN 500 MG: 500 TABLET, FILM COATED ORAL at 02:11

## 2020-11-09 RX ADMIN — DULOXETINE 60 MG: 30 CAPSULE, DELAYED RELEASE ORAL at 08:11

## 2020-11-09 RX ADMIN — IPRATROPIUM BROMIDE AND ALBUTEROL SULFATE 3 ML: .5; 3 SOLUTION RESPIRATORY (INHALATION) at 08:11

## 2020-11-09 RX ADMIN — HYDROCODONE BITARTRATE AND ACETAMINOPHEN 1 TABLET: 10; 325 TABLET ORAL at 08:11

## 2020-11-09 RX ADMIN — METHOCARBAMOL TABLETS 500 MG: 500 TABLET, COATED ORAL at 12:11

## 2020-11-09 RX ADMIN — Medication 10 ML: at 01:11

## 2020-11-09 RX ADMIN — Medication 10 ML: at 05:11

## 2020-11-09 RX ADMIN — PRAVASTATIN SODIUM 40 MG: 40 TABLET ORAL at 08:11

## 2020-11-09 RX ADMIN — FUROSEMIDE 40 MG: 40 TABLET ORAL at 08:11

## 2020-11-09 RX ADMIN — FERROUS SULFATE TAB 325 MG (65 MG ELEMENTAL FE) 325 MG: 325 (65 FE) TAB at 08:11

## 2020-11-09 RX ADMIN — LISINOPRIL 10 MG: 10 TABLET ORAL at 08:11

## 2020-11-09 RX ADMIN — IPRATROPIUM BROMIDE AND ALBUTEROL SULFATE 3 ML: .5; 3 SOLUTION RESPIRATORY (INHALATION) at 01:11

## 2020-11-09 NOTE — ASSESSMENT & PLAN NOTE
vanc 1450mg IV every 24hr x 8 weeks total till 12/3 per ID ; will stay here for the duration   PT  vanc now 1000mg IV every 24hr till 12/3 per ID last vanc trough 10/31 15.8    Reach out to ns today for guidance on re-imaging  Noted per ID Plan for 6 - 8 weeks of IV antibiotics with repeat MRI at midpoint of treatment as no surgical drainage undertaken.  - ID will follow.  She is currently midpoint; will plan for MRI of thoracic on Monday .  Cont vanc now 750mg IV daily with trough 11/7 16.3 .

## 2020-11-09 NOTE — PT/OT/SLP PROGRESS
"Physical Therapy Treatment    Patient Name:  Martina Betancourt   MRN:  [id number]    Recommendations:     Discharge Recommendations:  nursing facility, basic   Discharge Equipment Recommendations: lift device, hospital bed   Barriers to discharge: Decreased caregiver support    Assessment:     Martina Betancourt is a 70 y.o. female admitted with a medical diagnosis of Debility.  She presents with the following impairments/functional limitations:  weakness, impaired endurance, impaired self care skills, impaired functional mobilty, gait instability, impaired balance, pain, decreased safety awareness, decreased lower extremity function, decreased upper extremity function. Patient continue to perform static  Standing balance and tolerance trng using RW but limited due to pain at lower back  And bilat knees today.      Rehab Prognosis: Fair; patient would benefit from acute skilled PT services to address these deficits and reach maximum level of function.    Recent Surgery: * No surgery found *      Plan:     During this hospitalization, patient to be seen daily to address the identified rehab impairments via gait training, therapeutic activities, therapeutic exercises and progress toward the following goals:    · Plan of Care Expires:  11/10/20    Subjective     Chief Complaint: Patient had an MRI this morning. Patient reports feeling discomfort during transitional movement.   Patient/Family Comments/goals: " To try my best in Therapy"  Pain/Comfort:  · Pain Rating 1: 8/10  · Location - Side 1: Bilateral  · Location - Orientation 1: lower  · Location 1: back  · Pain Addressed 1: Reposition, Pre-medicate for activity  · Pain Rating Post-Intervention 1: 8/10  · Pain Rating 2: 8/10  · Location - Side 2: Bilateral  · Location - Orientation 2: lower  · Location 2: knee  · Pain Addressed 2: Cessation of Activity, Pre-medicate for activity  · Pain Rating Post-Intervention 2: 8/10      Objective:     Communicated with patient  prior " to session.  Patient found supine with PureWick, peripheral IV, oxygen upon PT entry to room.     General Precautions: Standard, fall   Orthopedic Precautions:N/A   Braces: N/A     Functional Mobility:  · Bed Mobility:     · Rolling Left:  minimal assistance and cues using bed rail  · Rolling Right: minimal assistance and cues using bed rail  · Scooting: minimal assistance and cues  · Supine to Sit: minimal assistance and cues   · Sit to Supine: moderate assistance and cues in ascending bilat LE  · Transfers:     · Sit to Stand:  moderate assistancce and cues with standard walker  · Balance: Standing with standard walker x 1 minute and 10 seconds with moderate assistance and cues.      AM-PAC 6 CLICK MOBILITY  Turning over in bed (including adjusting bedclothes, sheets and blankets)?: 3  Sitting down on and standing up from a chair with arms (e.g., wheelchair, bedside commode, etc.): 2  Moving from lying on back to sitting on the side of the bed?: 3  Moving to and from a bed to a chair (including a wheelchair)?: 2  Need to walk in hospital room?: 1  Climbing 3-5 steps with a railing?: 1  Basic Mobility Total Score: 12       Therapeutic Activities and Exercises:   Worked on body sequence on bed mobility and sit to stand transfers; Standing balance and tolernace ex using std walker; Performed bilat LE strengthening ex x 10 reps on each such as LAQ, hip flexion/ heel slides, scooting at supine and at sitting.    Patient left supine with all lines intact, call button in reach, bed alarm on and nursing notified..    GOALS:   Multidisciplinary Problems     Physical Therapy Goals        Problem: Physical Therapy Goal    Goal Priority Disciplines Outcome Goal Variances Interventions   Physical Therapy Goal     PT, PT/OT Ongoing, Progressing     Description: Goals to be met by: 11/10/20    Patient will increase functional independence with mobility by performin. Rolling to sides using bed rail with contact guard  assistance and cues  2. Supine to sit with minimal assistance and cues  3. Sit to supine with moderate assistance and cues  4. Tolerate Static Sit at side of the bed for 10 minutes with Standby Assistance  5. Bed to chair transfer with moderate assistance and cues using Slide Board TECHNIQUE  6. Lower extremity exercise program x15 reps per handout, with assistance as needed                      Time Tracking:     PT Received On: 11/09/20  PT Start Time: 1315     PT Stop Time: 1345  PT Total Time (min): 30 min     Billable Minutes: Therapeutic Activity 15 and Therapeutic Exercise 15    Treatment Type: Treatment  PT/PTA: PT     PTA Visit Number: 0     Edgar Lamas, PT  11/09/2020

## 2020-11-09 NOTE — PLAN OF CARE
Patient Agrees and Compliant with Plan of Care:  Monitor Vital Signs; Note B/P this shift.116/54.  Monitor Breathing Pattern; Patient on oxygen at 3L nc. With oxygen saturation of 95-96%. Bilateral Breath sounds auscultated posteriorly with fine crackles Bilateral lower lobes. No c/o shortness of breath.  Maintain I/O's Urine output this 12 hr shift was 1600. PO intake 220.  Maintain Pain Regimen; Patient received Hydrocodone X 1 this shift.with hot packs under knees.  Monitor PICC line ; Note unable to flush left port; ; right port very sluggish.Dressing change due 11/9/20..  Administer IV Antibiotics as ordered; Patient on Vancomycin on Day shift 750mg. Maintain PICC line . Note Picc line in upper right arm  Unable to flush one port.  Monitor Vancomycin Troughs as ordered; Next trough to be done on 11/9/20 at 12:30.  Daily weight; 135.5kg  Maintain skin integrity; No redness noted to folds in abdomen and or under breast.  Fall Precautions; Maintain patient Safety; Bed Alarm in use.  No falls or injury noted this shift.

## 2020-11-09 NOTE — ASSESSMENT & PLAN NOTE
Was walking with walker prior to pneumonia/bacteremia admit last month then after d.c was only w/c bound (gonzalez round) will add pt here and try and get her as strong as poss as she lives at home with family.   10/23 Chronic co back ache but also has recent high ankle fx ; per EDDIE Vinson NP Spoke with Ariela VASQUES who will see patient while on SWING. She looked over x rays and hx and reports that patient can weight bear as tolerated. Nurse/OT/MD notified    · 10/26 Supine to Sit: maximal assistance, of 2 persons and patient able to reach with UE to push through rail and with improved push off with UE and initiation  · Sit to Supine: maximal assistance of 2 people with improved ability of patient to control descent of upper body  · Transfers:     · Sit to Stand:  moderate assistance, maximal assistance of 2 persons with no AD.  PT and PT tech blocking both knees while assisting pt to elevate hips from bed  Balance: pt able to stand x15 seconds EOB limited largely by knee and back pain.     10/28.  · Bed Mobility:     · Rolling Left:  moderate assistance  · Rolling Right: moderate assistance  · Scooting: maximal assistance  · Supine to Sit: maximum/moderate assistace and cues  · Sit to Supine: maximum assistace x 1-2 and cues  · Transfers:     · Sit to Stand:  maximum assistance and cues inside the parallel bars with facilitation tech  · Bed to Chair: to wheelchair  with  maximum assistance and cues  using  Slide Board  · Balance: Standing Static inside the parallel bars with Poor grade. Tolerated for ~10 seconds with 2 attempts with  Maximum assistance and cues.  10/30 Standing with RW Static: Poor for ~15 seconds tolerance with max Assistance and cues  11/2  wheelchair maximum assistance x 2 and cues  with  slide board  using  slide/scoot tech  11/4 Sit to Stand:  Max/moderate assistance and cues with standard walker  Balance: Static Stand with Std Walker: Fair- with 2 mins and 7 seconds  tolerance and  max/moderate assistance and cues  · 11/6 max/moderate assistance and cues  with standard walker  Balance: Standing Static with std walker: Poor+ for 1 minute and 15 seconds(1st attempt); 45 seconds(2nd attempt)    11/9 she is becoming stronger  · Cont with PT  · Rolling Left:  stand by assistance  · Rolling Right: stand by assistance  · Scooting: stand by assistance  · Supine to Sit: contact guard assistance  · Sit to Supine: minimum assistance  · Transfers:     Sit to Stand:  moderate assistance with rolling walker.

## 2020-11-09 NOTE — SUBJECTIVE & OBJECTIVE
Review of Systems   Constitutional: Negative for activity change, fatigue, fever and unexpected weight change.   HENT: Negative for congestion, ear pain, hearing loss, rhinorrhea and sore throat.    Eyes: Negative for redness and visual disturbance.   Respiratory: Negative for cough, shortness of breath and wheezing.    Cardiovascular: Negative for chest pain, palpitations and leg swelling.   Gastrointestinal: Negative for abdominal pain, constipation, diarrhea, nausea and vomiting.   Genitourinary: Negative for dysuria, frequency and urgency.   Musculoskeletal: Positive for back pain. Negative for joint swelling and neck pain.   Skin: Negative for color change, rash and wound.   Neurological: Negative for dizziness, tremors, weakness, light-headedness and headaches.   Psychiatric/Behavioral: Negative for confusion and decreased concentration.     Objective:     Vital Signs (Most Recent):  Temp: 97.9 °F (36.6 °C) (11/08/20 1925)  Pulse: 74 (11/09/20 0815)  Resp: 18 (11/09/20 0815)  BP: (!) 116/56 (11/08/20 1925)  SpO2: 96 % (11/09/20 0815) Vital Signs (24h Range):  Temp:  [97.9 °F (36.6 °C)] 97.9 °F (36.6 °C)  Pulse:  [66-74] 74  Resp:  [18-20] 18  SpO2:  [95 %-96 %] 96 %  BP: (116)/(56) 116/56     Weight: 130.5 kg (287 lb 11.2 oz)  Body mass index is 41.28 kg/m².    Intake/Output Summary (Last 24 hours) at 11/9/2020 0955  Last data filed at 11/9/2020 0601  Gross per 24 hour   Intake 490 ml   Output 1600 ml   Net -1110 ml      Physical Exam  Vitals signs and nursing note reviewed.   Constitutional:       General: She is not in acute distress.     Appearance: She is well-developed. She is obese.   HENT:      Head: Normocephalic and atraumatic.      Right Ear: External ear normal.      Left Ear: External ear normal.      Nose: Nose normal.      Mouth/Throat:      Mouth: Mucous membranes are moist.      Pharynx: Oropharynx is clear.   Eyes:      General:         Right eye: No discharge.         Left eye: No  discharge.      Extraocular Movements: Extraocular movements intact.      Conjunctiva/sclera: Conjunctivae normal.      Pupils: Pupils are equal, round, and reactive to light.   Neck:      Musculoskeletal: Neck supple.      Thyroid: No thyromegaly.   Cardiovascular:      Rate and Rhythm: Normal rate and regular rhythm.      Heart sounds: No murmur.   Pulmonary:      Effort: Pulmonary effort is normal. No respiratory distress.      Breath sounds: No wheezing, rhonchi or rales.   Abdominal:      General: Bowel sounds are normal. There is no distension.      Palpations: Abdomen is soft.      Tenderness: There is no abdominal tenderness.   Musculoskeletal:      Right lower leg: No edema.      Left lower leg: No edema.   Lymphadenopathy:      Cervical: No cervical adenopathy.   Skin:     General: Skin is warm and dry.   Neurological:      General: No focal deficit present.      Mental Status: She is alert.      Cranial Nerves: No cranial nerve deficit.      Comments: Obese, not very cooperative with PT.  Max assistance   Psychiatric:         Behavior: Behavior normal.         Significant Labs:   BMP:   Recent Labs   Lab 11/09/20 0226   GLU 90      K 3.7   CL 99   CO2 32*   BUN 13   CREATININE 1.0   CALCIUM 10.3   MG 1.9     CBC:   Recent Labs   Lab 11/09/20 0226   WBC 7.24   HGB 9.9*   HCT 33.6*   *     11/7 vanc trough 16.3

## 2020-11-09 NOTE — ASSESSMENT & PLAN NOTE
Increase lisinopril 2.5>10mg daily  10/23 SBP 160s at times; lisinopril just increased will give more time for lisinopril to work before titration  10/26 SBP 140s; cont to monitor  10/28 /80- increase lisinopril  10/30 BP much better 119/59- 138/62  .  11/2 /58.  VSS   11/9 /56- well controlled (cont lisinopril, lasix, isosorbide, metoprolol).

## 2020-11-09 NOTE — ASSESSMENT & PLAN NOTE
"Productive cough this am per nurse- " pale green, grey"   Add mucinex and give neb tx q 6 while awake  No fever, WBC normal   Increase activity ..  11/9 Laying flat today. No complaints SOB./cough.      "

## 2020-11-09 NOTE — PLAN OF CARE
Patient admitted as SWING, Assessment complete per flow sheet, AAOX4, RR even unlabored BBS diminished, on 3 L NC. Abdomen soft, non distended, with active bowel sounds x 4 quads.Tolerating diet well. PICC to RT arm SL, purple port does not flush, red port sluggish, Dsg C/D/I.  Medications administered per MAR, tolerated well. HERMELINDA heels elevated off bed with pillows, daily betadine applied to pressure ulcer. safety precautions maintained, bed alarm on, free from falls/ injury, call bell in reach, instructed to call for needs, voiced understanding, agrees with plan of care.  MRI complete.  PICC line dressing changed by Ariela ANDERSON RN

## 2020-11-09 NOTE — PROGRESS NOTES
[facility] Medicine  Progress Note    Patient Name: Martina Betancourt  MRN: [id number]  Patient Class: IP- Swing   Admission Date: 10/20/2020  Length of Stay: 20 days  Attending Physician: Vamsi Manuel MD  Primary Care Provider: Joe Fuller Iii, MD        Subjective:     Principal Problem:Debility        HPI:  69yo female patient with hx of alzheiners, CAD, HLD, HTN, myopathy, MI, obesity, sleep apnea and OA (knees non ambulatory uses gonzalez round). She was living at home with her daughter. Recently admitted to [facility] with MRSA bacteremia and subsequent pneumonia treated with Zyvox x 7 days with clearance of her bacteremia now admitted with back pain found to have T9-10 osteo discitis, T8-10 epidural phlegmon with associated paraverterbral abscesses. Spine infection likely hematogenous from under treated bacteremia. Neurosurgery has been consulted. She has been on Vancomycin and Ceftriaxone. Underwent T9-10 disc space biopsy with IR on 10/16. Cultures negative, though had been on IV antibiotics multiple days prior. Afebrile. HDS. Repeat blood cx sterile. ID rec cont vanc 1750mg q 24 till 12/3.     Overview/Hospital Course:  10/23 Here for skilled ; needing IV abx for spinal abscess;  vanc 1750mg q 24 till 12/3  Afebrile , 3LNC - O2 sat 95%   + debility, very deconditioned; bilt LE x 10 reps followed by bed mobility trng and static sit balance and tolerance at side of the bed  C/o back pain with activity; Occupational therapist notes that she is very resistant to movement and c/o severe pain with minimal activity.   Will order toradol 15mg x 1dose IV; pt did much better after toradol rx today per OT. Will order daily prior to OT as tolerated  Monitor renal fx     10/26 here for skilled therapy and cont IV abc. Vanc 1750mg iv every 12hr. Noted elevated WBC this am and she report that she has had diarrhea for the last 4 days.   · PT reports Supine to Sit: maximal assistance, of 2  persons and patient able to reach with UE to push through rail and with improved push off with UE and initiation  · Sit to Supine: maximal assistance of 2 people with improved ability of patient to control descent of upper body  · Transfers:     · Sit to Stand:  moderate assistance, maximal assistance of 2 persons with no AD.  PT and PT tech blocking both knees while assisting pt to elevate hips from bed  Balance: pt able to stand x15 seconds EOB limited largely by knee and back pain.       10/28  She is still on vanc IV daily. No longer with diarrhea. Did have heme positive stools H/H stable on lovenox for DVT prophylaxis and plavix. Will monitor h/h M/Thurs. No fever. No elevated WBC  pt is really immobile.  · Bed Mobility:     · Rolling Left:  moderate assistance  · Rolling Right: moderate assistance  · Scooting: maximal assistance  · Supine to Sit: maximum/moderate assistace and cues  · Sit to Supine: maximum assistace x 1-2 and cues  · Transfers:     · Sit to Stand:  maximum assistance and cues inside the parallel bars with facilitation tech  · Bed to Chair: to wheelchair  with  maximum assistance and cues  using  Slide Board  · Balance: Standing Static inside the parallel bars with Poor grade. Tolerated for ~10 seconds with 2 attempts with  Maximum assistance and cues.  Cont PT daily  10/30  IV  vanc 1,000mg q 24hr x 8 weeks total till 12/3 per ID for discitis; random vanc 12.5 yesterday    No longer with diarrhea. Did have heme positive stools H/H stable on lovenox for DVT prophylaxis and plavix. H&H stable, lovenox stopped. No fever. No elevated WBC. She has productive cough this am. K+ 3.2 yesterday, getting lasix, will repeat KCL 40meq today   pt is really immobile.Standing with RW Static: Poor for ~15 seconds tolerance with max Assistance and cues  Has PICC line- one port clotted;       11/2 she remains on vanc 1000mg  q 24hr x 8 weeks total till 12/3 per ID for discitis. Vanc trough 15.8 10/31/20.  Vss/afebrile. intermittent back pain with prn pain meds helping. She is not doing much with PT. She uses gonzalez round at home but can transfer independently. Here she is max assist     11/4 Remains on vanc 1000mg  q 24hr x 8 weeks total till 12/3 per ID for discitis. Vanc trough 16.6 on 11/2   Afebrile , having regular BMs; getting Norco 10mg q 6   · Continues to require max assist; Sit to Stand:  Max/moderate assistance and cues with standard walker  Balance: Static Stand with Std Walker: Fair- with 2 mins and 7 seconds  tolerance and max/moderate assistance and cues  11/4 Remains on vanc 1000mg  q 24hr x 8 weeks total till 12/3 per ID for discitis. Vanc trough 16.6 on 11/2   Afebrile, WBC nml, creat stable , K+3.4  Requires maximal assist but finally standing with PT with walker .    11/9/20    Remains on vanc 1000mg  q 24hr x 8 weeks total till 12/3 per ID for discitis. VSS/afebrile. No elevated WBC. Last vanc trough 16.3 11/7  She is progressing with PT now min assist from sit to supine and mod assist sit to stand with RW,      Review of Systems   Constitutional: Negative for activity change, fatigue, fever and unexpected weight change.   HENT: Negative for congestion, ear pain, hearing loss, rhinorrhea and sore throat.    Eyes: Negative for redness and visual disturbance.   Respiratory: Negative for cough, shortness of breath and wheezing.    Cardiovascular: Negative for chest pain, palpitations and leg swelling.   Gastrointestinal: Negative for abdominal pain, constipation, diarrhea, nausea and vomiting.   Genitourinary: Negative for dysuria, frequency and urgency.   Musculoskeletal: Positive for back pain. Negative for joint swelling and neck pain.   Skin: Negative for color change, rash and wound.   Neurological: Negative for dizziness, tremors, weakness, light-headedness and headaches.   Psychiatric/Behavioral: Negative for confusion and decreased concentration.     Objective:     Vital Signs (Most  Recent):  Temp: 97.9 °F (36.6 °C) (11/08/20 1925)  Pulse: 74 (11/09/20 0815)  Resp: 18 (11/09/20 0815)  BP: (!) 116/56 (11/08/20 1925)  SpO2: 96 % (11/09/20 0815) Vital Signs (24h Range):  Temp:  [97.9 °F (36.6 °C)] 97.9 °F (36.6 °C)  Pulse:  [66-74] 74  Resp:  [18-20] 18  SpO2:  [95 %-96 %] 96 %  BP: (116)/(56) 116/56     Weight: 130.5 kg (287 lb 11.2 oz)  Body mass index is 41.28 kg/m².    Intake/Output Summary (Last 24 hours) at 11/9/2020 0955  Last data filed at 11/9/2020 0601  Gross per 24 hour   Intake 490 ml   Output 1600 ml   Net -1110 ml      Physical Exam  Vitals signs and nursing note reviewed.   Constitutional:       General: She is not in acute distress.     Appearance: She is well-developed. She is obese.   HENT:      Head: Normocephalic and atraumatic.      Right Ear: External ear normal.      Left Ear: External ear normal.      Nose: Nose normal.      Mouth/Throat:      Mouth: Mucous membranes are moist.      Pharynx: Oropharynx is clear.   Eyes:      General:         Right eye: No discharge.         Left eye: No discharge.      Extraocular Movements: Extraocular movements intact.      Conjunctiva/sclera: Conjunctivae normal.      Pupils: Pupils are equal, round, and reactive to light.   Neck:      Musculoskeletal: Neck supple.      Thyroid: No thyromegaly.   Cardiovascular:      Rate and Rhythm: Normal rate and regular rhythm.      Heart sounds: No murmur.   Pulmonary:      Effort: Pulmonary effort is normal. No respiratory distress.      Breath sounds: No wheezing, rhonchi or rales.   Abdominal:      General: Bowel sounds are normal. There is no distension.      Palpations: Abdomen is soft.      Tenderness: There is no abdominal tenderness.   Musculoskeletal:      Right lower leg: No edema.      Left lower leg: No edema.   Lymphadenopathy:      Cervical: No cervical adenopathy.   Skin:     General: Skin is warm and dry.   Neurological:      General: No focal deficit present.      Mental Status: She  is alert.      Cranial Nerves: No cranial nerve deficit.      Comments: Obese, not very cooperative with PT.  Max assistance   Psychiatric:         Behavior: Behavior normal.         Significant Labs:   BMP:   Recent Labs   Lab 11/09/20 0226   GLU 90      K 3.7   CL 99   CO2 32*   BUN 13   CREATININE 1.0   CALCIUM 10.3   MG 1.9     CBC:   Recent Labs   Lab 11/09/20 0226   WBC 7.24   HGB 9.9*   HCT 33.6*   *     11/7 vanc trough 16.3      Assessment/Plan:      * Debility  Was walking with walker prior to pneumonia/bacteremia admit last month then after d.c was only w/c bound (gonzalez round) will add pt here and try and get her as strong as poss as she lives at home with family.   10/23 Chronic co back ache but also has recent high ankle fx ; per EDDIE Vinson NP Spoke with Ariela VASQUES who will see patient while on SWING. She looked over x rays and hx and reports that patient can weight bear as tolerated. Nurse/OT/MD notified    · 10/26 Supine to Sit: maximal assistance, of 2 persons and patient able to reach with UE to push through rail and with improved push off with UE and initiation  · Sit to Supine: maximal assistance of 2 people with improved ability of patient to control descent of upper body  · Transfers:     · Sit to Stand:  moderate assistance, maximal assistance of 2 persons with no AD.  PT and PT tech blocking both knees while assisting pt to elevate hips from bed  Balance: pt able to stand x15 seconds EOB limited largely by knee and back pain.     10/28.  · Bed Mobility:     · Rolling Left:  moderate assistance  · Rolling Right: moderate assistance  · Scooting: maximal assistance  · Supine to Sit: maximum/moderate assistace and cues  · Sit to Supine: maximum assistace x 1-2 and cues  · Transfers:     · Sit to Stand:  maximum assistance and cues inside the parallel bars with facilitation tech  · Bed to Chair: to wheelchair  with  maximum assistance and cues  using  Slide Board  · Balance:  "Standing Static inside the parallel bars with Poor grade. Tolerated for ~10 seconds with 2 attempts with  Maximum assistance and cues.  10/30 Standing with RW Static: Poor for ~15 seconds tolerance with max Assistance and cues  11/2  wheelchair maximum assistance x 2 and cues  with  slide board  using  slide/scoot tech  11/4 Sit to Stand:  Max/moderate assistance and cues with standard walker  Balance: Static Stand with Std Walker: Fair- with 2 mins and 7 seconds  tolerance and max/moderate assistance and cues  · 11/6 max/moderate assistance and cues  with standard walker  Balance: Standing Static with std walker: Poor+ for 1 minute and 15 seconds(1st attempt); 45 seconds(2nd attempt)    11/9 she is becoming stronger  · Cont with PT  · Rolling Left:  stand by assistance  · Rolling Right: stand by assistance  · Scooting: stand by assistance  · Supine to Sit: contact guard assistance  · Sit to Supine: minimum assistance  · Transfers:     Sit to Stand:  moderate assistance with rolling walker.    Cough productive of purulent sputum  Productive cough this am per nurse- " pale green, grey"   Add mucinex and give neb tx q 6 while awake  No fever, WBC normal   Increase activity ..  11/9 Laying flat today. No complaints SOB./cough.        Diarrhea  Stools d/c as diarrhea has improved. + heme occult . lovenox stopped cbc stable .    Malnutrition of mild degree  Dietary.  Boost.      Hydronephrosis  Seen on CT 10/7 - u/a was negative .      CAD (coronary artery disease)  Cont asa, plavix, lasix, isosorbide, lisinopril, toprol and statin.      HTN (hypertension)  Increase lisinopril 2.5>10mg daily  10/23 SBP 160s at times; lisinopril just increased will give more time for lisinopril to work before titration  10/26 SBP 140s; cont to monitor  10/28 /80- increase lisinopril  10/30 BP much better 119/59- 138/62  .  11/2 /58.  VSS   11/9 /56- well controlled (cont lisinopril, lasix, isosorbide, " metoprolol).    Discitis  vanc 1450mg IV every 24hr x 8 weeks total till 12/3 per ID ; will stay here for the duration   PT  vanc now 1000mg IV every 24hr till 12/3 per ID last vanc trough 10/31 15.8    Reach out to ns today for guidance on re-imaging  Noted per ID Plan for 6 - 8 weeks of IV antibiotics with repeat MRI at midpoint of treatment as no surgical drainage undertaken.  - ID will follow.  She is currently midpoint; will plan for MRI of thoracic on Monday .  Cont vanc now 750mg IV daily with trough 11/7 16.3 .    Severe obesity (BMI >= 40)  Using boost as she has low appetite and low protein       Obstructive sleep apnea treated with continuous positive airway pressure (CPAP)  Bring home cpap      Dementia without behavioral disturbance  Cont namenda and aricept .      VTE Risk Mitigation (From admission, onward)    None          Discharge Planning   BRIT: 12/3/2020     Code Status: DNR   Is the patient medically ready for discharge?:     Reason for patient still in hospital (select all that apply): Treatment  Discharge Plan A: Home with family, Home Health                  Julio César Huntley MD  Department of Hospital Medicine   [facility]

## 2020-11-09 NOTE — PROGRESS NOTES
Pharmacokinetic Assessment Follow Up: IV Vancomycin    Vancomycin serum concentration assessment(s):    The trough level was drawn correctly and can be used to guide therapy at this time. The measurement is within the desired definitive target range of 15 to 20 mcg/mL.    Vancomycin Regimen Plan:    Continue regimen to Vancomycin 750 mg IV every 24 hours with next serum trough concentration measured at 1220 prior to 3rd dose on 11/11/2020    Drug levels (last 3 results):  Recent Labs   Lab Result Units 11/07/20  1157 11/09/20  1227   Vancomycin, Random ug/mL 16.3  --    Vancomycin-Trough ug/mL  --  16.5       Pharmacy will continue to follow and monitor vancomycin.    Please contact pharmacy at extension [phone] for questions regarding this assessment.    Thank you for the consult,   Amaya Richardson       Patient brief summary:  Martina Betancourt is a 70 y.o. female initiated on antimicrobial therapy with IV Vancomycin for treatment of bone/joint infection    The patient's current regimen is Vancomycin 750 mg IV every 24 hours     Drug Allergies:   Review of patient's allergies indicates:   Allergen Reactions    Hydroxyzine hcl     Tizanidine        Actual Body Weight:   130.5 kg    Renal Function:   Estimated Creatinine Clearance: 74.9 mL/min (based on SCr of 1 mg/dL).,     Dialysis Method (if applicable):  N/A    CBC (last 72 hours):  Recent Labs   Lab Result Units 11/09/20  0226   WBC K/uL 7.24   Hemoglobin g/dL 9.9*   Hematocrit % 33.6*   Platelets K/uL 391*   Gran % % 52.1   Lymph % % 30.0   Mono % % 12.7   Eosinophil % % 4.4   Basophil % % 0.4   Differential Method  Automated       Metabolic Panel (last 72 hours):  Recent Labs   Lab Result Units 11/07/20  0617 11/08/20  0736 11/09/20  0226   Sodium mmol/L 139 139 140   Potassium mmol/L 4.0 4.1 3.7   Chloride mmol/L 100 99 99   CO2 mmol/L 28 29 32*   Glucose mg/dL 102 98 90   BUN mg/dL 13 14 13   Creatinine mg/dL 1.1 1.0 1.0   Magnesium mg/dL  --   --  1.9    Phosphorus mg/dL  --   --  3.7       Vancomycin Administrations:  vancomycin given in the last 96 hours                   vancomycin 750 mg in dextrose 5 % 250 mL IVPB (ready to mix system) (mg) 750 mg New Bag 11/08/20 1313     750 mg New Bag 11/07/20 1354    vancomycin in dextrose 5 % 1 gram/250 mL IVPB 1,000 mg (mg) 1,000 mg New Bag 11/06/20 0006                Microbiologic Results:  Microbiology Results (last 7 days)     ** No results found for the last 168 hours. **

## 2020-11-10 LAB
ANION GAP SERPL CALC-SCNC: 10 MMOL/L (ref 8–16)
BUN SERPL-MCNC: 13 MG/DL (ref 8–23)
CALCIUM SERPL-MCNC: 10.4 MG/DL (ref 8.7–10.5)
CHLORIDE SERPL-SCNC: 98 MMOL/L (ref 95–110)
CO2 SERPL-SCNC: 31 MMOL/L (ref 23–29)
CREAT SERPL-MCNC: 1 MG/DL (ref 0.5–1.4)
EST. GFR  (AFRICAN AMERICAN): >60 ML/MIN/1.73 M^2
EST. GFR  (NON AFRICAN AMERICAN): 56 ML/MIN/1.73 M^2
GLUCOSE SERPL-MCNC: 93 MG/DL (ref 70–110)
POTASSIUM SERPL-SCNC: 3.7 MMOL/L (ref 3.5–5.1)
SODIUM SERPL-SCNC: 139 MMOL/L (ref 136–145)

## 2020-11-10 PROCEDURE — 25000003 PHARM REV CODE 250: Performed by: STUDENT IN AN ORGANIZED HEALTH CARE EDUCATION/TRAINING PROGRAM

## 2020-11-10 PROCEDURE — 27000221 HC OXYGEN, UP TO 24 HOURS

## 2020-11-10 PROCEDURE — 25000003 PHARM REV CODE 250: Performed by: INTERNAL MEDICINE

## 2020-11-10 PROCEDURE — 25000003 PHARM REV CODE 250: Performed by: NURSE PRACTITIONER

## 2020-11-10 PROCEDURE — 94761 N-INVAS EAR/PLS OXIMETRY MLT: CPT

## 2020-11-10 PROCEDURE — 97530 THERAPEUTIC ACTIVITIES: CPT

## 2020-11-10 PROCEDURE — 11000004 HC SNF PRIVATE

## 2020-11-10 PROCEDURE — 94640 AIRWAY INHALATION TREATMENT: CPT

## 2020-11-10 PROCEDURE — 36415 COLL VENOUS BLD VENIPUNCTURE: CPT

## 2020-11-10 PROCEDURE — 63600175 PHARM REV CODE 636 W HCPCS: Performed by: FAMILY MEDICINE

## 2020-11-10 PROCEDURE — A4216 STERILE WATER/SALINE, 10 ML: HCPCS | Performed by: INTERNAL MEDICINE

## 2020-11-10 PROCEDURE — 80048 BASIC METABOLIC PNL TOTAL CA: CPT

## 2020-11-10 PROCEDURE — 97110 THERAPEUTIC EXERCISES: CPT

## 2020-11-10 PROCEDURE — 94799 UNLISTED PULMONARY SVC/PX: CPT

## 2020-11-10 PROCEDURE — 25000242 PHARM REV CODE 250 ALT 637 W/ HCPCS: Performed by: NURSE PRACTITIONER

## 2020-11-10 PROCEDURE — 97535 SELF CARE MNGMENT TRAINING: CPT

## 2020-11-10 PROCEDURE — 99900035 HC TECH TIME PER 15 MIN (STAT)

## 2020-11-10 RX ADMIN — IPRATROPIUM BROMIDE AND ALBUTEROL SULFATE 3 ML: .5; 3 SOLUTION RESPIRATORY (INHALATION) at 07:11

## 2020-11-10 RX ADMIN — ACETAMINOPHEN 500 MG: 500 TABLET, FILM COATED ORAL at 02:11

## 2020-11-10 RX ADMIN — MEMANTINE 10 MG: 10 TABLET ORAL at 08:11

## 2020-11-10 RX ADMIN — PRAVASTATIN SODIUM 40 MG: 40 TABLET ORAL at 08:11

## 2020-11-10 RX ADMIN — Medication 10 ML: at 07:11

## 2020-11-10 RX ADMIN — ACETAMINOPHEN 500 MG: 500 TABLET, FILM COATED ORAL at 08:11

## 2020-11-10 RX ADMIN — METHOCARBAMOL TABLETS 500 MG: 500 TABLET, COATED ORAL at 05:11

## 2020-11-10 RX ADMIN — Medication 10 ML: at 02:11

## 2020-11-10 RX ADMIN — HYDROCODONE BITARTRATE AND ACETAMINOPHEN 1 TABLET: 10; 325 TABLET ORAL at 11:11

## 2020-11-10 RX ADMIN — METHOCARBAMOL TABLETS 500 MG: 500 TABLET, COATED ORAL at 02:11

## 2020-11-10 RX ADMIN — Medication 10 ML: at 11:11

## 2020-11-10 RX ADMIN — LISINOPRIL 10 MG: 10 TABLET ORAL at 07:11

## 2020-11-10 RX ADMIN — DONEPEZIL HYDROCHLORIDE 10 MG: 5 TABLET, FILM COATED ORAL at 08:11

## 2020-11-10 RX ADMIN — GUAIFENESIN 600 MG: 600 TABLET, EXTENDED RELEASE ORAL at 07:11

## 2020-11-10 RX ADMIN — ISOSORBIDE MONONITRATE 60 MG: 60 TABLET, EXTENDED RELEASE ORAL at 07:11

## 2020-11-10 RX ADMIN — ASPIRIN 81 MG: 81 TABLET, COATED ORAL at 07:11

## 2020-11-10 RX ADMIN — PANTOPRAZOLE SODIUM 40 MG: 40 TABLET, DELAYED RELEASE ORAL at 05:11

## 2020-11-10 RX ADMIN — MICONAZOLE NITRATE: 20 OINTMENT TOPICAL at 10:11

## 2020-11-10 RX ADMIN — METHOCARBAMOL TABLETS 500 MG: 500 TABLET, COATED ORAL at 07:11

## 2020-11-10 RX ADMIN — METOPROLOL SUCCINATE 50 MG: 50 TABLET, EXTENDED RELEASE ORAL at 08:11

## 2020-11-10 RX ADMIN — MICONAZOLE NITRATE: 20 OINTMENT TOPICAL at 08:11

## 2020-11-10 RX ADMIN — Medication 10 ML: at 12:11

## 2020-11-10 RX ADMIN — MEMANTINE 10 MG: 10 TABLET ORAL at 07:11

## 2020-11-10 RX ADMIN — HYDROCODONE BITARTRATE AND ACETAMINOPHEN 1 TABLET: 10; 325 TABLET ORAL at 05:11

## 2020-11-10 RX ADMIN — Medication 10 ML: at 06:11

## 2020-11-10 RX ADMIN — METHOCARBAMOL TABLETS 500 MG: 500 TABLET, COATED ORAL at 08:11

## 2020-11-10 RX ADMIN — DULOXETINE 60 MG: 30 CAPSULE, DELAYED RELEASE ORAL at 07:11

## 2020-11-10 RX ADMIN — FERROUS SULFATE TAB 325 MG (65 MG ELEMENTAL FE) 325 MG: 325 (65 FE) TAB at 08:11

## 2020-11-10 RX ADMIN — VANCOMYCIN HYDROCHLORIDE 750 MG: 750 INJECTION, POWDER, LYOPHILIZED, FOR SOLUTION INTRAVENOUS at 02:11

## 2020-11-10 RX ADMIN — ACETAMINOPHEN 500 MG: 500 TABLET, FILM COATED ORAL at 07:11

## 2020-11-10 RX ADMIN — GUAIFENESIN 600 MG: 600 TABLET, EXTENDED RELEASE ORAL at 08:11

## 2020-11-10 RX ADMIN — ALUMINUM HYDROXIDE, MAGNESIUM HYDROXIDE, AND DIMETHICONE 30 ML: 400; 400; 40 SUSPENSION ORAL at 01:11

## 2020-11-10 RX ADMIN — HYDROCODONE BITARTRATE AND ACETAMINOPHEN 1 TABLET: 10; 325 TABLET ORAL at 10:11

## 2020-11-10 RX ADMIN — IPRATROPIUM BROMIDE AND ALBUTEROL SULFATE 3 ML: .5; 3 SOLUTION RESPIRATORY (INHALATION) at 01:11

## 2020-11-10 RX ADMIN — CLOPIDOGREL 75 MG: 75 TABLET, FILM COATED ORAL at 07:11

## 2020-11-10 NOTE — PLAN OF CARE
Recommendations:  1. Continue current cardiac diet.  2. Continue to encourage increased PO intake.  3. Boost no longer needed to meet nutritional needs  4. RD to follow     Goals: Pt. will continue to meet at least 75% ENN via meals by next RD follow up.  Nutrition Goal Status: goal met, goal continues    Interventions(treatment strategy):  Sodium modified diet  Collaboration with other providers  [facility]    Nutrition Discharge Planning: Heart healthy diet (low sodium and saturated fat)

## 2020-11-10 NOTE — PROGRESS NOTES
[facility]  Adult Nutrition  Progress Note    SUMMARY       Recommendations    Recommendations:  1. Continue current cardiac diet.  2. Continue to encourage increased PO intake.  3. Boost no longer needed to meet nutritional needs  4. RD to follow    Goals: Pt. will continue to meet at least 75% ENN via meals by next RD follow up.  Nutrition Goal Status: goal met, goal continues  Communication of RD Recs: (Documented in POC)    Reason for Assessment    Reason For Assessment: RD follow-up  Diagnosis: (Debility)  Relevant Medical History: HTN, CAD, HLD, obesity  Interdisciplinary Rounds: did not attend    General Information Comments:     10/20:Pt admitted for SWING. She consumed 25-50% of meals yesterday and 0% so far today (very little bites). Per chart review, pt with a 42lb (12.5%) significant wt loss in 3 months , and Pt states she has lost 60lbs since june. NFPE performed today, mild wasting found in temples and orbital region; pt meets criteria for moderate malnutrition.      10/27: Pt states her appettie is good and that she is eating all of her + Boost Pudding, but flowsheets show Meal intake is fluctuating from 0-50%. On IV abx. Positive for diarrhea. Stage 2 wound to right heel. Spoke with MD about nutritional needs, states they will monitor.      11/3-Spoke to pt. Obtained food preferences. Pt. states that Boost Pudding causes stomach discomfort. Pt. States that Boost Plus causes diarrhea. Pt.s intake improving at 75%. Pt. States she doesn't eat 100% of meals due to dislike of vegetables.     11/10-Spoke with patient to obtain current PO intake. Patient states she is consuming 75% of her meals. Boost plus and Boost milkshake d/c due to causing diarhhea.  Patient's intake is adequate and states appetite is good.    Nutrition Discharge Planning: Heart healthy diet (low sodium and saturated fat)    Nutrition Risk Screen    Nutrition Risk Screen: no indicators present    Nutrition/Diet  "History    Spiritual, Cultural Beliefs, [employer]yb[employer]ian Practices, Values that Affect Care: no  Food Allergies: NKFA  Factors Affecting Nutritional Intake: impaired cognitive status/motor control    Anthropometrics    Temp: 97.6 °F (36.4 °C)  Height Method: Stated  Height: 5' 10" (177.8 cm)  Height (inches): 70 in  Weight Method: Bed Scale  Weight: 130.5 kg (287 lb 11.2 oz)  Weight (lb): 287.7 lb  Ideal Body Weight (IBW), Female: 150 lb  % Ideal Body Weight, Female (lb): 195.47 %  BMI (Calculated): 41.3  BMI Grade: greater than 40 - morbid obesity  Usual Body Weight (UBW), kg: (!) 156.8 kg  % Usual Body Weight: 85       Lab/Procedures/Meds    Pertinent Labs Reviewed: reviewed  Pertinent Labs Comments: GFR 56(L)  Pertinent Medications Reviewed: reviewed  Pertinent Medications Comments: No pertinent medications at this time    Estimated/Assessed Needs    Weight Used For Calorie Calculations: 133 kg (293 lb 3.4 oz)  Energy Calorie Requirements (kcal): 1920  Energy Need Method: Huddy-St Jeor(no AF)  Protein Requirements: 106-133 g/d  Weight Used For Protein Calculations: 133 kg (293 lb 3.4 oz)     Estimated Fluid Requirement Method: RDA Method(or per MD)  RDA Method (mL): 1920       Nutrition Prescription Ordered    Current Diet Order: Cardiac diet    Evaluation of Received Nutrient/Fluid Intake    I/O: -4.5L since admit  Fluid Required: meeting needs  Comments: LBM 11/7  % Intake of Estimated Energy Needs: 75 - 100 %  % Meal Intake: 75 - 100 %    Nutrition Risk    Level of Risk/Frequency of Follow-up: low     Assessment and Plan    Malnutrition of mild degree  Nutrition Problem:  (Moderate) Protein-Calorie Malnutrition  Malnutrition in the context of Chronic Illness/Injury     Related to (etiology):  Inadequate oral intake     Signs and Symptoms (as evidenced by):  Energy Intake: <75% of estimated energy requirement for >/= 1 month  Body Fat Depletion: mild depletion of orbitals   Muscle Mass Depletion: mild depletion of " temples   Weight Loss: 12.5%% x 3 months     Interventions(treatment strategy):  Sodium modified diet  Collaboration with other providers  Commercial food     Nutrition Diagnosis Status:  Improving     Nutrition Problem  Inadequate oral intake     Related to (etiology):   Food preferences     Signs and Symptoms (as evidenced by):   Pt. Reports intake of 50-75% ENN secondary to dislike of vegetables and stomach discomfort with commercial foods.     Interventions (treatment strategy):  Commercial beverage     Nutrition Diagnosis Status:   Resolved    Monitor and Evaluation    Food and Nutrient Intake: energy intake, food and beverage intake  Food and Nutrient Adminstration: diet order  Physical Activity and Function: nutrition-related ADLs and IADLs  Anthropometric Measurements: weight, weight change, body mass index  Biochemical Data, Medical Tests and Procedures: electrolyte and renal panel  Nutrition-Focused Physical Findings: overall appearance     Malnutrition Assessment  Malnutrition Type: chronic illness          Weight Loss (Malnutrition): greater than 7.5% in 3 months  Energy Intake (Malnutrition): less than 75% for greater than or equal to 1 month   Orbital Region (Subcutaneous Fat Loss): mild depletion  Upper Arm Region (Subcutaneous Fat Loss): well nourished  Thoracic and Lumbar Region: well nourished   [place] Region (Muscle Loss): mild depletion  Clavicle Bone Region (Muscle Loss): well nourished  Clavicle and Acromion Bone Region (Muscle Loss): well nourished  Scapular Bone Region (Muscle Loss): well nourished  Dorsal Hand (Muscle Loss): well nourished  Patellar Region (Muscle Loss): well nourished  Anterior Thigh Region (Muscle Loss): well nourished  Posterior Calf Region (Muscle Loss): well nourished                 Nutrition Follow-Up    RD Follow-up?: Yes

## 2020-11-10 NOTE — PT/OT/SLP PROGRESS
"Physical Therapy Treatment    Patient Name:  Martina Betancourt   MRN:  [id number]    Recommendations:     Discharge Recommendations:  nursing facility, basic   Discharge Equipment Recommendations: lift device, hospital bed   Barriers to discharge: Decreased caregiver support    Assessment:     Martina Betancourt is a 70 y.o. female admitted with a medical diagnosis of Debility.  She presents with the following impairments/functional limitations:  weakness, impaired endurance, impaired cognition, decreased coordination, impaired self care skills, impaired functional mobilty, gait instability, pain, decreased lower extremity function, decreased upper extremity function, impaired balance. Patient tolerated PT session well today. Static stand tolerance using Standard Walker and sitting actiivty at bedside remains limited due to increasing pain at lower back. Patient gradually increase performance on bed mobility with lesser physical assistance needed.    Rehab Prognosis: Fair; patient would benefit from acute skilled PT services to address these deficits and reach maximum level of function.    Recent Surgery: * No surgery found *      Plan:     During this hospitalization, patient to be seen daily to address the identified rehab impairments via gait training, therapeutic activities, therapeutic exercises and progress toward the following goals:    · Plan of Care Expires:  11/10/20    Subjective     Chief Complaint: chronic Right Lower back Pain and increases upon transitional movement  Patient/Family Comments/goals: "To do better"  Pain/Comfort:  · Pain Rating 1: 8/10  · Location - Side 1: Right  · Location - Orientation 1: lower  · Location 1: back  · Pain Addressed 1: Pre-medicate for activity, Cessation of Activity, Reposition  · Pain Rating Post-Intervention 1: 8/10  · Pain Rating 2: 8/10  · Location - Side 2: Bilateral  · Location - Orientation 2: lower  · Location 2: knee  · Pain Addressed 2: Cessation of Activity, " Pre-medicate for activity  · Pain Rating Post-Intervention 2: 7/10      Objective:     Communicated with patient  prior to session.  Patient found supine with PureWick, peripheral IV, oxygen upon PT entry to room.     General Precautions: Standard, fall   Orthopedic Precautions:N/A   Braces: N/A     Functional Mobility:  · Bed Mobility:     · Rolling Left:  contact guard assistance  · Rolling Right: contact guard assistance  · Scooting: contact guard assistance and cues  · Supine to Sit: minimal/contact guard assistance and cues  · Sit to Supine: moderate assistanjce and cues in ascending bilat LE  · Transfers:     · Sit to Stand:  moderate assistance and cues with standard walker  · Balance: Static Standing with standard walker: Poor+ for 1 minute and 15 seconds tolerance and with moderate assistance.      AM-PAC 6 CLICK MOBILITY  Turning over in bed (including adjusting bedclothes, sheets and blankets)?: 3  Sitting down on and standing up from a chair with arms (e.g., wheelchair, bedside commode, etc.): 2  Moving from lying on back to sitting on the side of the bed?: 3  Moving to and from a bed to a chair (including a wheelchair)?: 2  Need to walk in hospital room?: 1  Climbing 3-5 steps with a railing?: 1  Basic Mobility Total Score: 12       Therapeutic Activities and Exercises:   Worked on bed mobility ex rolling to sides x 5 reps on each side, scooting at supine, scooting at sitting, bilat LE ex at sitting such as LAQ and hip flexion x 7 - 10 reps; Sit to stand transfer trng with std walker and Static stand tolerance ex with std walker.    Patient left supine with all lines intact, call button in reach, bed alarm on and nursing notified..    GOALS:   Multidisciplinary Problems     Physical Therapy Goals        Problem: Physical Therapy Goal    Goal Priority Disciplines Outcome Goal Variances Interventions   Physical Therapy Goal     PT, PT/OT Ongoing, Progressing     Description: Goals to be met by:  11/10/20    Patient will increase functional independence with mobility by performin. Rolling to sides using bed rail with contact guard assistance and cues  2. Supine to sit with minimal assistance and cues  3. Sit to supine with moderate assistance and cues  4. Tolerate Static Sit at side of the bed for 10 minutes with Standby Assistance  5. Bed to chair transfer with moderate assistance and cues using Slide Board TECHNIQUE  6. Lower extremity exercise program x15 reps per handout, with assistance as needed                      Time Tracking:     PT Received On: 11/10/20  PT Start Time: 1340     PT Stop Time: 1415  PT Total Time (min): 35 min     Billable Minutes: Therapeutic Activity 20 and Therapeutic Exercise 15    Treatment Type: Treatment  PT/PTA: PT     PTA Visit Number: 0     Edgar Lamas, PT  11/10/2020

## 2020-11-10 NOTE — PT/OT/SLP PROGRESS
Occupational Therapy   Treatment    Name: Martina Betancourt  MRN: [id number]  Admitting Diagnosis:  Debility       Recommendations:     Discharge Recommendations: nursing facility, basic  Discharge Equipment Recommendations:  lift device, hospital bed  Barriers to discharge:       Assessment:     Martina Betancourt is a 70 y.o. female with a medical diagnosis of Debility.  She presents with participation in standing tasks with OT this date, noted able to stand with RW x2 trials, session ended due to increased pain after standing.     Performance deficits affecting function are weakness, impaired endurance, impaired cognition, decreased coordination, impaired coordination, impaired self care skills, decreased upper extremity function, impaired functional mobilty, decreased lower extremity function, gait instability, decreased safety awareness, impaired balance, pain, impaired cardiopulmonary response to activity.     Rehab Prognosis:  Fair; patient would benefit from acute skilled OT services to address these deficits and reach maximum level of function.       Plan:     Patient to be seen 5 x/week to address the above listed problems via self-care/home management, therapeutic activities, therapeutic exercises  · Plan of Care Expires: 11/11/20  · Plan of Care Reviewed with: patient    Subjective     Pain/Comfort:  · Pain Rating 1: 8/10  · Location - Side 1: Right  · Location - Orientation 1: lower  · Location 1: back  · Pain Addressed 1: Reposition, Cessation of Activity, Nurse notified  · Pain Rating 2: 8/10    Objective:     Communicated with: Nursing prior to session.  Patient found supine with PureWick, peripheral IV, oxygen upon OT entry to room.    General Precautions: Standard, fall   Orthopedic Precautions:N/A   Braces: N/A     Occupational Performance:     Bed Mobility:    · Patient completed Rolling/Turning to Right with supervision  · Patient completed Scooting/Bridging with supervision  · Patient completed  Supine to Sit with supervision  · Patient completed Sit to Supine with supervision     Functional Mobility/Transfers:  · Patient completed Sit <> Stand Transfer with maximal assistance  with  rolling walker  x 2 trials   · Functional Mobility: not completed    Activities of Daily Living:  · Patient declined      [facility] 6 Click ADL: 14    Treatment & Education:  Therapist educated patient on importance of staying awake throughout day and moving in bed and completing UE ROM tasks, patient verbalized understanding. Therapist educated patient on proper positioning to stand, patient requiring both hand rails up and bed flat with walker and gait belt in order to stand with very poor standing endurance and impulsive sitting due to reports of pain.     Patient left supine with all lines intact, call button in reach and nursing notifiedEducation:      GOALS:   Multidisciplinary Problems     Occupational Therapy Goals        Problem: Occupational Therapy Goal    Goal Priority Disciplines Outcome Interventions   Occupational Therapy Goal     OT, PT/OT Ongoing, Progressing    Description: Goals to be met by: 11/11/2020     Patient will increase functional independence with ADLs by performing:    Feeding with Supervision. (GOAL MET)  UE Dressing with Supervision.  LE Dressing with Moderate Assistance.  Grooming while seated with Supervision (GOAL MET).  Toileting from bedside commode with Minimal Assistance for hygiene and clothing management.   Sitting at edge of bed x5 minutes with Supervision. (GOAL MET)  Rolling to Bilateral with Supervision. (GOAL MET)  Supine to sit with Minimal Assistance. (GOAL MET)  Squat pivot transfers with Minimal Assistance.  Toilet transfer to bedside commode with Minimal Assistance.  Upper extremity exercise program x10 reps per handout, with assistance as needed. (GOAL MET)                     Time Tracking:     OT Date of Treatment: 11/10/20  OT Start Time: 1020  OT Stop Time: 1043  OT Total Time  (min): 23 min    Billable Minutes:Self Care/Home Management 8 minutes  Therapeutic Activity 15 minutes    Golden Bartlett OT  11/10/2020

## 2020-11-10 NOTE — PLAN OF CARE
Patient admitted as SWING, Assessment complete per flow sheet, AAOX4, RR even unlabored BBS diminished, on 3 L NC. Abdomen soft, non distended, with active bowel sounds x 4 quads.Tolerating diet well. PICC to RT arm SL, purple port does not flush, red port sluggish, Dsg C/D/I.  Medications administered per MAR, tolerated well. HERMELINDA heels elevated off bed with pillows, daily betadine applied to pressure ulcer. safety precautions maintained, bed alarm on, free from falls/ injury, call bell in reach, instructed to call for needs, voiced understanding, agrees with plan of care  Worked with PT, see notes

## 2020-11-11 LAB
ANION GAP SERPL CALC-SCNC: 12 MMOL/L (ref 8–16)
BUN SERPL-MCNC: 14 MG/DL (ref 8–23)
CALCIUM SERPL-MCNC: 10.4 MG/DL (ref 8.7–10.5)
CHLORIDE SERPL-SCNC: 99 MMOL/L (ref 95–110)
CO2 SERPL-SCNC: 29 MMOL/L (ref 23–29)
CREAT SERPL-MCNC: 1 MG/DL (ref 0.5–1.4)
EST. GFR  (AFRICAN AMERICAN): >60 ML/MIN/1.73 M^2
EST. GFR  (NON AFRICAN AMERICAN): 56 ML/MIN/1.73 M^2
GLUCOSE SERPL-MCNC: 94 MG/DL (ref 70–110)
POTASSIUM SERPL-SCNC: 3.3 MMOL/L (ref 3.5–5.1)
SODIUM SERPL-SCNC: 140 MMOL/L (ref 136–145)
VANCOMYCIN TROUGH SERPL-MCNC: 14.8 UG/ML (ref 10–22)

## 2020-11-11 PROCEDURE — 25000003 PHARM REV CODE 250: Performed by: INTERNAL MEDICINE

## 2020-11-11 PROCEDURE — 25000003 PHARM REV CODE 250: Performed by: NURSE PRACTITIONER

## 2020-11-11 PROCEDURE — 94799 UNLISTED PULMONARY SVC/PX: CPT

## 2020-11-11 PROCEDURE — A4216 STERILE WATER/SALINE, 10 ML: HCPCS | Performed by: INTERNAL MEDICINE

## 2020-11-11 PROCEDURE — 99305 1ST NF CARE MODERATE MDM 35: CPT | Mod: ,,, | Performed by: STUDENT IN AN ORGANIZED HEALTH CARE EDUCATION/TRAINING PROGRAM

## 2020-11-11 PROCEDURE — 11000004 HC SNF PRIVATE

## 2020-11-11 PROCEDURE — 94761 N-INVAS EAR/PLS OXIMETRY MLT: CPT

## 2020-11-11 PROCEDURE — 25000003 PHARM REV CODE 250: Performed by: FAMILY MEDICINE

## 2020-11-11 PROCEDURE — 80202 ASSAY OF VANCOMYCIN: CPT

## 2020-11-11 PROCEDURE — 97110 THERAPEUTIC EXERCISES: CPT

## 2020-11-11 PROCEDURE — 97530 THERAPEUTIC ACTIVITIES: CPT

## 2020-11-11 PROCEDURE — 94640 AIRWAY INHALATION TREATMENT: CPT

## 2020-11-11 PROCEDURE — 99305 PR NURSING FACILITY CARE, INIT, MOD SEVERITY: ICD-10-PCS | Mod: ,,, | Performed by: STUDENT IN AN ORGANIZED HEALTH CARE EDUCATION/TRAINING PROGRAM

## 2020-11-11 PROCEDURE — 27000221 HC OXYGEN, UP TO 24 HOURS

## 2020-11-11 PROCEDURE — 80048 BASIC METABOLIC PNL TOTAL CA: CPT

## 2020-11-11 PROCEDURE — 25000242 PHARM REV CODE 250 ALT 637 W/ HCPCS: Performed by: NURSE PRACTITIONER

## 2020-11-11 PROCEDURE — 63600175 PHARM REV CODE 636 W HCPCS: Performed by: FAMILY MEDICINE

## 2020-11-11 PROCEDURE — 36415 COLL VENOUS BLD VENIPUNCTURE: CPT

## 2020-11-11 RX ORDER — POTASSIUM CHLORIDE 20 MEQ/1
40 TABLET, EXTENDED RELEASE ORAL ONCE
Status: COMPLETED | OUTPATIENT
Start: 2020-11-11 | End: 2020-11-11

## 2020-11-11 RX ADMIN — MICONAZOLE NITRATE: 20 OINTMENT TOPICAL at 09:11

## 2020-11-11 RX ADMIN — POTASSIUM CHLORIDE 40 MEQ: 1500 TABLET, EXTENDED RELEASE ORAL at 01:11

## 2020-11-11 RX ADMIN — Medication 10 ML: at 06:11

## 2020-11-11 RX ADMIN — Medication 10 ML: at 05:11

## 2020-11-11 RX ADMIN — DONEPEZIL HYDROCHLORIDE 10 MG: 5 TABLET, FILM COATED ORAL at 08:11

## 2020-11-11 RX ADMIN — FUROSEMIDE 40 MG: 40 TABLET ORAL at 08:11

## 2020-11-11 RX ADMIN — Medication 10 ML: at 01:11

## 2020-11-11 RX ADMIN — IPRATROPIUM BROMIDE AND ALBUTEROL SULFATE 3 ML: .5; 3 SOLUTION RESPIRATORY (INHALATION) at 01:11

## 2020-11-11 RX ADMIN — VANCOMYCIN HYDROCHLORIDE 750 MG: 750 INJECTION, POWDER, LYOPHILIZED, FOR SOLUTION INTRAVENOUS at 01:11

## 2020-11-11 RX ADMIN — Medication 10 ML: at 11:11

## 2020-11-11 RX ADMIN — CLOPIDOGREL 75 MG: 75 TABLET, FILM COATED ORAL at 08:11

## 2020-11-11 RX ADMIN — HYDROCODONE BITARTRATE AND ACETAMINOPHEN 1 TABLET: 10; 325 TABLET ORAL at 02:11

## 2020-11-11 RX ADMIN — HYDROCODONE BITARTRATE AND ACETAMINOPHEN 1 TABLET: 10; 325 TABLET ORAL at 08:11

## 2020-11-11 RX ADMIN — ASPIRIN 81 MG: 81 TABLET, COATED ORAL at 08:11

## 2020-11-11 RX ADMIN — LISINOPRIL 10 MG: 10 TABLET ORAL at 08:11

## 2020-11-11 RX ADMIN — METHOCARBAMOL TABLETS 500 MG: 500 TABLET, COATED ORAL at 08:11

## 2020-11-11 RX ADMIN — ACETAMINOPHEN 500 MG: 500 TABLET, FILM COATED ORAL at 08:11

## 2020-11-11 RX ADMIN — PRAVASTATIN SODIUM 40 MG: 40 TABLET ORAL at 08:11

## 2020-11-11 RX ADMIN — METOPROLOL SUCCINATE 50 MG: 50 TABLET, EXTENDED RELEASE ORAL at 08:11

## 2020-11-11 RX ADMIN — GUAIFENESIN 600 MG: 600 TABLET, EXTENDED RELEASE ORAL at 08:11

## 2020-11-11 RX ADMIN — FERROUS SULFATE TAB 325 MG (65 MG ELEMENTAL FE) 325 MG: 325 (65 FE) TAB at 08:11

## 2020-11-11 RX ADMIN — MICONAZOLE NITRATE: 20 OINTMENT TOPICAL at 08:11

## 2020-11-11 RX ADMIN — MEMANTINE 10 MG: 10 TABLET ORAL at 08:11

## 2020-11-11 RX ADMIN — METHOCARBAMOL TABLETS 500 MG: 500 TABLET, COATED ORAL at 01:11

## 2020-11-11 RX ADMIN — ISOSORBIDE MONONITRATE 60 MG: 60 TABLET, EXTENDED RELEASE ORAL at 08:11

## 2020-11-11 RX ADMIN — HYDROCODONE BITARTRATE AND ACETAMINOPHEN 1 TABLET: 10; 325 TABLET ORAL at 09:11

## 2020-11-11 RX ADMIN — DULOXETINE 60 MG: 30 CAPSULE, DELAYED RELEASE ORAL at 08:11

## 2020-11-11 RX ADMIN — IPRATROPIUM BROMIDE AND ALBUTEROL SULFATE 3 ML: .5; 3 SOLUTION RESPIRATORY (INHALATION) at 07:11

## 2020-11-11 RX ADMIN — METHOCARBAMOL TABLETS 500 MG: 500 TABLET, COATED ORAL at 05:11

## 2020-11-11 RX ADMIN — ACETAMINOPHEN 500 MG: 500 TABLET, FILM COATED ORAL at 02:11

## 2020-11-11 RX ADMIN — PANTOPRAZOLE SODIUM 40 MG: 40 TABLET, DELAYED RELEASE ORAL at 05:11

## 2020-11-11 NOTE — ASSESSMENT & PLAN NOTE
Was walking with walker prior to pneumonia/bacteremia admit last month then after d.c was only w/c bound (gonzalez round) will add pt here and try and get her as strong as poss as she lives at home with family.   10/23 Chronic co back ache but also has recent high ankle fx ; per EDDIE Vinson NP Spoke with Ariela VASQUES who will see patient while on SWING. She looked over x rays and hx and reports that patient can weight bear as tolerated. Nurse/OT/MD notified    · 10/26 Supine to Sit: maximal assistance, of 2 persons and patient able to reach with UE to push through rail and with improved push off with UE and initiation  · Sit to Supine: maximal assistance of 2 people with improved ability of patient to control descent of upper body  · Transfers:     · Sit to Stand:  moderate assistance, maximal assistance of 2 persons with no AD.  PT and PT tech blocking both knees while assisting pt to elevate hips from bed  Balance: pt able to stand x15 seconds EOB limited largely by knee and back pain.     10/28.  · Bed Mobility:     · Rolling Left:  moderate assistance  · Rolling Right: moderate assistance  · Scooting: maximal assistance  · Supine to Sit: maximum/moderate assistace and cues  · Sit to Supine: maximum assistace x 1-2 and cues  · Transfers:     · Sit to Stand:  maximum assistance and cues inside the parallel bars with facilitation tech  · Bed to Chair: to wheelchair  with  maximum assistance and cues  using  Slide Board  · Balance: Standing Static inside the parallel bars with Poor grade. Tolerated for ~10 seconds with 2 attempts with  Maximum assistance and cues.  10/30 Standing with RW Static: Poor for ~15 seconds tolerance with max Assistance and cues  11/2  wheelchair maximum assistance x 2 and cues  with  slide board  using  slide/scoot tech  11/4 Sit to Stand:  Max/moderate assistance and cues with standard walker  Balance: Static Stand with Std Walker: Fair- with 2 mins and 7 seconds  tolerance and  max/moderate assistance and cues  · 11/6 max/moderate assistance and cues  with standard walker  Balance: Standing Static with std walker: Poor+ for 1 minute and 15 seconds(1st attempt); 45 seconds(2nd attempt)    11/9 she is becoming stronger  · Cont with PT  · Rolling Left:  stand by assistance  · Rolling Right: stand by assistance  · Scooting: stand by assistance  · Supine to Sit: contact guard assistance  · Sit to Supine: minimum assistance  · Transfers:     Sit to Stand:  moderate assistance with rolling walker.3    · 11/11 Rolling Left:  contact guard assistance  · Rolling Right: contact guard assistance  · Scooting: contact guard assistance and cues  · Supine to Sit: minimal/contact guard assistance and cues  · Sit to Supine: moderate assistanjce and cues in ascending bilat LE  · Transfers:     · Sit to Stand:  moderate assistance and cues with standard walker  · Balance: Static Standing with standard walker: Poor+ for 1 minute and 15 seconds tolerance and with moderate assistance.

## 2020-11-11 NOTE — PLAN OF CARE
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  Following aspiration biopsy, admitted here for long-term antibiotics. Patient on Vancomycin daily.  Trough drawn today and level 14.8.  Dose given, trough to be repeated 11/13/20 at 12:30.  Right brachial PICC line, double lumen.  Red port flushes with blood return, purple lumen clotted.  Patient working with PT/OT, working on sitting on edge of bed and standing for short periods to assist with wheelchair transfer upon discharge.   Pain controlled with PRN Norco, 10mg.    Potassium level 3.3 today, 40meq of oral potassium given to supplement.   Patient on Purewick for skin prevention.   Turn every 2 hours for skin integrity.

## 2020-11-11 NOTE — PROGRESS NOTES
Pharmacokinetic Assessment Follow Up: IV Vancomycin    Vancomycin serum concentration assessment(s):    The trough level was drawn correctly and can be used to guide therapy at this time. The measurement is within the desired definitive target range of 10 to 15 mcg/mL.    Vancomycin Regimen Plan:    Continue regimen to Vancomycin 750 mg IV every 24 hours with next serum trough concentration measured at 1230 prior to 3rd dose on 11/13/20    Drug levels (last 3 results):  Recent Labs   Lab Result Units 11/09/20  1227 11/11/20  1228   Vancomycin-Trough ug/mL 16.5 14.8       Pharmacy will continue to follow and monitor vancomycin.    Please contact pharmacy at extension [phone] for questions regarding this assessment.    Thank you for the consult,   Davian Guerrero       Patient brief summary:  Martina Betancourt is a 70 y.o. female initiated on antimicrobial therapy with IV Vancomycin for treatment of bone/joint infection    The patient's current regimen is Vancomycin 750 mg IV every 24 hours     Drug Allergies:   Review of patient's allergies indicates:   Allergen Reactions    Hydroxyzine hcl     Tizanidine        Actual Body Weight:   129.3kg    Renal Function:   Estimated Creatinine Clearance: 74.5 mL/min (based on SCr of 1 mg/dL).,     Dialysis Method (if applicable):      CBC (last 72 hours):  Recent Labs   Lab Result Units 11/09/20  0226   WBC K/uL 7.24   Hemoglobin g/dL 9.9*   Hematocrit % 33.6*   Platelets K/uL 391*   Gran % % 52.1   Lymph % % 30.0   Mono % % 12.7   Eosinophil % % 4.4   Basophil % % 0.4   Differential Method  Automated       Metabolic Panel (last 72 hours):  Recent Labs   Lab Result Units 11/09/20  0226 11/10/20  0549 11/11/20  0535   Sodium mmol/L 140 139 140   Potassium mmol/L 3.7 3.7 3.3*   Chloride mmol/L 99 98 99   CO2 mmol/L 32* 31* 29   Glucose mg/dL 90 93 94   BUN mg/dL 13 13 14   Creatinine mg/dL 1.0 1.0 1.0   Magnesium mg/dL 1.9  --   --    Phosphorus mg/dL 3.7  --   --         Vancomycin Administrations:  vancomycin given in the last 96 hours                   vancomycin 750 mg in dextrose 5 % 250 mL IVPB (ready to mix system) (mg) 750 mg New Bag 11/11/20 1303     750 mg New Bag 11/10/20 1417     750 mg New Bag 11/09/20 1311     750 mg New Bag 11/08/20 1313                Microbiologic Results:  Microbiology Results (last 7 days)     ** No results found for the last 168 hours. **

## 2020-11-11 NOTE — PROGRESS NOTES
[facility] Medicine  Progress Note    Patient Name: Martina Betancourt  MRN: [id number]  Patient Class: IP- Swing   Admission Date: 10/20/2020  Length of Stay: 22 days  Attending Physician: Vamsi Manuel MD  Primary Care Provider: Joe Fuller Iii, MD        Subjective:     Principal Problem:Debility        HPI:  71yo female patient with hx of alzheiners, CAD, HLD, HTN, myopathy, MI, obesity, sleep apnea and OA (knees non ambulatory uses gonzalez round). She was living at home with her daughter. Recently admitted to [facility] with MRSA bacteremia and subsequent pneumonia treated with Zyvox x 7 days with clearance of her bacteremia now admitted with back pain found to have T9-10 osteo discitis, T8-10 epidural phlegmon with associated paraverterbral abscesses. Spine infection likely hematogenous from under treated bacteremia. Neurosurgery has been consulted. She has been on Vancomycin and Ceftriaxone. Underwent T9-10 disc space biopsy with IR on 10/16. Cultures negative, though had been on IV antibiotics multiple days prior. Afebrile. HDS. Repeat blood cx sterile. ID rec cont vanc 1750mg q 24 till 12/3.     Overview/Hospital Course:  10/23 Here for skilled ; needing IV abx for spinal abscess;  vanc 1750mg q 24 till 12/3  Afebrile , 3LNC - O2 sat 95%   + debility, very deconditioned; bilt LE x 10 reps followed by bed mobility trng and static sit balance and tolerance at side of the bed  C/o back pain with activity; Occupational therapist notes that she is very resistant to movement and c/o severe pain with minimal activity.   Will order toradol 15mg x 1dose IV; pt did much better after toradol rx today per OT. Will order daily prior to OT as tolerated  Monitor renal fx     10/26 here for skilled therapy and cont IV abc. Vanc 1750mg iv every 12hr. Noted elevated WBC this am and she report that she has had diarrhea for the last 4 days.   · PT reports Supine to Sit: maximal assistance, of 2  persons and patient able to reach with UE to push through rail and with improved push off with UE and initiation  · Sit to Supine: maximal assistance of 2 people with improved ability of patient to control descent of upper body  · Transfers:     · Sit to Stand:  moderate assistance, maximal assistance of 2 persons with no AD.  PT and PT tech blocking both knees while assisting pt to elevate hips from bed  Balance: pt able to stand x15 seconds EOB limited largely by knee and back pain.       10/28  She is still on vanc IV daily. No longer with diarrhea. Did have heme positive stools H/H stable on lovenox for DVT prophylaxis and plavix. Will monitor h/h M/Thurs. No fever. No elevated WBC  pt is really immobile.  · Bed Mobility:     · Rolling Left:  moderate assistance  · Rolling Right: moderate assistance  · Scooting: maximal assistance  · Supine to Sit: maximum/moderate assistace and cues  · Sit to Supine: maximum assistace x 1-2 and cues  · Transfers:     · Sit to Stand:  maximum assistance and cues inside the parallel bars with facilitation tech  · Bed to Chair: to wheelchair  with  maximum assistance and cues  using  Slide Board  · Balance: Standing Static inside the parallel bars with Poor grade. Tolerated for ~10 seconds with 2 attempts with  Maximum assistance and cues.  Cont PT daily  10/30  IV  vanc 1,000mg q 24hr x 8 weeks total till 12/3 per ID for discitis; random vanc 12.5 yesterday    No longer with diarrhea. Did have heme positive stools H/H stable on lovenox for DVT prophylaxis and plavix. H&H stable, lovenox stopped. No fever. No elevated WBC. She has productive cough this am. K+ 3.2 yesterday, getting lasix, will repeat KCL 40meq today   pt is really immobile.Standing with RW Static: Poor for ~15 seconds tolerance with max Assistance and cues  Has PICC line- one port clotted;       11/2 she remains on vanc 1000mg  q 24hr x 8 weeks total till 12/3 per ID for discitis. Vanc trough 15.8 10/31/20.  Vss/afebrile. intermittent back pain with prn pain meds helping. She is not doing much with PT. She uses gonzalez round at home but can transfer independently. Here she is max assist     11/4 Remains on vanc 1000mg  q 24hr x 8 weeks total till 12/3 per ID for discitis. Vanc trough 16.6 on 11/2   Afebrile , having regular BMs; getting Norco 10mg q 6   · Continues to require max assist; Sit to Stand:  Max/moderate assistance and cues with standard walker  Balance: Static Stand with Std Walker: Fair- with 2 mins and 7 seconds  tolerance and max/moderate assistance and cues  11/4 Remains on vanc 1000mg  q 24hr x 8 weeks total till 12/3 per ID for discitis. Vanc trough 16.6 on 11/2   Afebrile, WBC nml, creat stable , K+3.4  Requires maximal assist but finally standing with PT with walker .    11/9/20    Remains on vanc 1000mg  q 24hr x 8 weeks total till 12/3 per ID for discitis. VSS/afebrile. No elevated WBC. Last vanc trough 16.3 11/7  She is progressing with PT now min assist from sit to supine and mod assist sit to stand with RW,    11/11 remains on vanc 750mg IV every 24hr, diose monitored by pharm. Last vanc trough 16.5 11/9. Repeat MRI done Monday. Contact with neurosurgery sent to discuss comparison with original johny since she is mid way on IV abx and had no intervention. She has no comopklainst. VSS/labs reviewed. She is standing at bedside 1min and 15 sec with mod assist      Review of Systems   Constitutional: Negative for activity change, fatigue, fever and unexpected weight change.   HENT: Negative for congestion, ear pain, hearing loss, rhinorrhea and sore throat.    Eyes: Negative for redness and visual disturbance.   Respiratory: Negative for cough, shortness of breath and wheezing.    Cardiovascular: Negative for chest pain, palpitations and leg swelling.   Gastrointestinal: Negative for abdominal pain, constipation, diarrhea, nausea and vomiting.   Genitourinary: Negative for dysuria, frequency and  urgency.   Musculoskeletal: Positive for back pain. Negative for joint swelling and neck pain.   Skin: Negative for color change, rash and wound.   Neurological: Negative for dizziness, tremors, weakness, light-headedness and headaches.   Psychiatric/Behavioral: Negative for confusion and decreased concentration.     Objective:     Vital Signs (Most Recent):  Temp: 97.4 °F (36.3 °C) (11/11/20 0714)  Pulse: 67 (11/11/20 0725)  Resp: 20 (11/11/20 0850)  BP: 138/62 (11/11/20 0714)  SpO2: (!) 92 % (11/11/20 0725) Vital Signs (24h Range):  Temp:  [96.9 °F (36.1 °C)-97.4 °F (36.3 °C)] 97.4 °F (36.3 °C)  Pulse:  [65-70] 67  Resp:  [16-20] 20  SpO2:  [91 %-96 %] 92 %  BP: (104-138)/(50-62) 138/62     Weight: 129.3 kg (285 lb 0.9 oz)  Body mass index is 40.9 kg/m².    Intake/Output Summary (Last 24 hours) at 11/11/2020 0953  Last data filed at 11/11/2020 0800  Gross per 24 hour   Intake 1019 ml   Output 1100 ml   Net -81 ml      Physical Exam  Vitals signs and nursing note reviewed.   Constitutional:       General: She is not in acute distress.     Appearance: She is well-developed. She is obese.   HENT:      Head: Normocephalic and atraumatic.      Right Ear: External ear normal.      Left Ear: External ear normal.      Nose: Nose normal.      Mouth/Throat:      Mouth: Mucous membranes are moist.      Pharynx: Oropharynx is clear.   Eyes:      General:         Right eye: No discharge.         Left eye: No discharge.      Extraocular Movements: Extraocular movements intact.      Conjunctiva/sclera: Conjunctivae normal.      Pupils: Pupils are equal, round, and reactive to light.   Neck:      Musculoskeletal: Neck supple.      Thyroid: No thyromegaly.   Cardiovascular:      Rate and Rhythm: Normal rate and regular rhythm.      Heart sounds: No murmur.   Pulmonary:      Effort: Pulmonary effort is normal. No respiratory distress.      Breath sounds: No wheezing, rhonchi or rales.   Abdominal:      General: Bowel sounds are  normal. There is no distension.      Palpations: Abdomen is soft.      Tenderness: There is no abdominal tenderness.   Musculoskeletal:      Right lower leg: No edema.      Left lower leg: No edema.   Lymphadenopathy:      Cervical: No cervical adenopathy.   Skin:     General: Skin is warm and dry.   Neurological:      General: No focal deficit present.      Mental Status: She is alert.      Cranial Nerves: No cranial nerve deficit.      Comments: Obese, not very cooperative with PT.  Max assistance   Psychiatric:         Behavior: Behavior normal.         Significant Labs:   BMP:   Recent Labs   Lab 11/11/20  0535   GLU 94      K 3.3*   CL 99   CO2 29   BUN 14   CREATININE 1.0   CALCIUM 10.4   phos 3.7  Mag 1.9  Lab Results   Component Value Date    WBC 7.24 11/09/2020    HGB 9.9 (L) 11/09/2020    HCT 33.6 (L) 11/09/2020    MCV 91 11/09/2020     (H) 11/09/2020 11/9 vanc trough 16.5    MRI thoracic spine   1. Altered signal intensity changes at T9/T10 with evidence of erosive focus about the opposing endplates and evidence of enhancing phlegmonous formation identified in the central component of the disc with central low signal intensity compatible with osteomyelitis/discitis.  Vertical dimensions of the area of interest cause extreme erosion along the anterior portion of the vertebral bodies of interest without evidence of any significant paraspinal component.  2. No significant soft tissue component.  3. Chronic degenerative spondylosis changes of the remaining segments of the thoracic spine.  4. Chronic degenerative changes of the entirety of the cervical spine.      Assessment/Plan:      * Debility  Was walking with walker prior to pneumonia/bacteremia admit last month then after d.c was only w/c bound (gonzalez round) will add pt here and try and get her as strong as poss as she lives at home with family.   10/23 Chronic co back ache but also has recent high ankle fx ; per EDDIE Vinson NP Spoke  with Ariela VASQUES who will see patient while on SWING. She looked over x rays and hx and reports that patient can weight bear as tolerated. Nurse/OT/MD notified    · 10/26 Supine to Sit: maximal assistance, of 2 persons and patient able to reach with UE to push through rail and with improved push off with UE and initiation  · Sit to Supine: maximal assistance of 2 people with improved ability of patient to control descent of upper body  · Transfers:     · Sit to Stand:  moderate assistance, maximal assistance of 2 persons with no AD.  PT and PT tech blocking both knees while assisting pt to elevate hips from bed  Balance: pt able to stand x15 seconds EOB limited largely by knee and back pain.     10/28.  · Bed Mobility:     · Rolling Left:  moderate assistance  · Rolling Right: moderate assistance  · Scooting: maximal assistance  · Supine to Sit: maximum/moderate assistace and cues  · Sit to Supine: maximum assistace x 1-2 and cues  · Transfers:     · Sit to Stand:  maximum assistance and cues inside the parallel bars with facilitation tech  · Bed to Chair: to wheelchair  with  maximum assistance and cues  using  Slide Board  · Balance: Standing Static inside the parallel bars with Poor grade. Tolerated for ~10 seconds with 2 attempts with  Maximum assistance and cues.  10/30 Standing with RW Static: Poor for ~15 seconds tolerance with max Assistance and cues  11/2  wheelchair maximum assistance x 2 and cues  with  slide board  using  slide/scoot tech  11/4 Sit to Stand:  Max/moderate assistance and cues with standard walker  Balance: Static Stand with Std Walker: Fair- with 2 mins and 7 seconds  tolerance and max/moderate assistance and cues  · 11/6 max/moderate assistance and cues  with standard walker  Balance: Standing Static with std walker: Poor+ for 1 minute and 15 seconds(1st attempt); 45 seconds(2nd attempt)    11/9 she is becoming stronger  · Cont with PT  · Rolling Left:  stand by  "assistance  · Rolling Right: stand by assistance  · Scooting: stand by assistance  · Supine to Sit: contact guard assistance  · Sit to Supine: minimum assistance  · Transfers:     Sit to Stand:  moderate assistance with rolling walker.3    · 11/11 Rolling Left:  contact guard assistance  · Rolling Right: contact guard assistance  · Scooting: contact guard assistance and cues  · Supine to Sit: minimal/contact guard assistance and cues  · Sit to Supine: moderate assistanjce and cues in ascending bilat LE  · Transfers:     · Sit to Stand:  moderate assistance and cues with standard walker  · Balance: Static Standing with standard walker: Poor+ for 1 minute and 15 seconds tolerance and with moderate assistance.       Cough productive of purulent sputum  Productive cough this am per nurse- " pale green, grey"   Add mucinex and give neb tx q 6 while awake  No fever, WBC normal   Increase activity ..  11/9 Laying flat today. No complaints SOB./cough.        Diarrhea  Stools d/c as diarrhea has improved. + heme occult . lovenox stopped cbc stable .    Malnutrition of mild degree  Dietary.  Boost.      Hydronephrosis  Seen on CT 10/7 - u/a was negative .      CAD (coronary artery disease)  Cont asa, plavix, lasix, isosorbide, lisinopril, toprol and statin.      HTN (hypertension)  Increase lisinopril 2.5>10mg daily  10/23 SBP 160s at times; lisinopril just increased will give more time for lisinopril to work before titration  10/26 SBP 140s; cont to monitor  10/28 /80- increase lisinopril  10/30 BP much better 119/59- 138/62  .  11/2 /58.  VSS   11/9 /56- well controlled (cont lisinopril, lasix, isosorbide, metoprolol).    Discitis  vanc 1450mg IV every 24hr x 8 weeks total till 12/3 per ID ; will stay here for the duration   PT  vanc now 1000mg IV every 24hr till 12/3 per ID last vanc trough 10/31 15.8    Reach out to ns today for guidance on re-imaging  Noted per ID Plan for 6 - 8 weeks of IV antibiotics " with repeat MRI at midpoint of treatment as no surgical drainage undertaken.  - ID will follow.  She is currently midpoint; will plan for MRI of thoracic on Monday .  Cont vanc now 750mg IV daily with trough 11/7 16.3 .    11/11 contacted neurosurgery for repeat MRI review. She is only able to stand at bedside with therapy for 1min and 15 sec with mod to max assist. She would be a difficult transfer at this point.     Severe obesity (BMI >= 40)  Using boost as she has low appetite and low protein       Obstructive sleep apnea treated with continuous positive airway pressure (CPAP)  Bring home cpap      Dementia without behavioral disturbance  Cont namenda and aricept .      VTE Risk Mitigation (From admission, onward)    None          Discharge Planning   BRIT: 12/3/2020     Code Status: DNR   Is the patient medically ready for discharge?:     Reason for patient still in hospital (select all that apply): Patient trending condition, Treatment and PT / OT recommendations  Discharge Plan A: Home with family, Home Health                  Caleb Elizabeth MD  Department of Hospital Medicine   [facility]

## 2020-11-11 NOTE — SUBJECTIVE & OBJECTIVE
Review of Systems   Constitutional: Negative for activity change, fatigue, fever and unexpected weight change.   HENT: Negative for congestion, ear pain, hearing loss, rhinorrhea and sore throat.    Eyes: Negative for redness and visual disturbance.   Respiratory: Negative for cough, shortness of breath and wheezing.    Cardiovascular: Negative for chest pain, palpitations and leg swelling.   Gastrointestinal: Negative for abdominal pain, constipation, diarrhea, nausea and vomiting.   Genitourinary: Negative for dysuria, frequency and urgency.   Musculoskeletal: Positive for back pain. Negative for joint swelling and neck pain.   Skin: Negative for color change, rash and wound.   Neurological: Negative for dizziness, tremors, weakness, light-headedness and headaches.   Psychiatric/Behavioral: Negative for confusion and decreased concentration.     Objective:     Vital Signs (Most Recent):  Temp: 97.4 °F (36.3 °C) (11/11/20 0714)  Pulse: 67 (11/11/20 0725)  Resp: 20 (11/11/20 0850)  BP: 138/62 (11/11/20 0714)  SpO2: (!) 92 % (11/11/20 0725) Vital Signs (24h Range):  Temp:  [96.9 °F (36.1 °C)-97.4 °F (36.3 °C)] 97.4 °F (36.3 °C)  Pulse:  [65-70] 67  Resp:  [16-20] 20  SpO2:  [91 %-96 %] 92 %  BP: (104-138)/(50-62) 138/62     Weight: 129.3 kg (285 lb 0.9 oz)  Body mass index is 40.9 kg/m².    Intake/Output Summary (Last 24 hours) at 11/11/2020 0953  Last data filed at 11/11/2020 0800  Gross per 24 hour   Intake 1019 ml   Output 1100 ml   Net -81 ml      Physical Exam  Vitals signs and nursing note reviewed.   Constitutional:       General: She is not in acute distress.     Appearance: She is well-developed. She is obese.   HENT:      Head: Normocephalic and atraumatic.      Right Ear: External ear normal.      Left Ear: External ear normal.      Nose: Nose normal.      Mouth/Throat:      Mouth: Mucous membranes are moist.      Pharynx: Oropharynx is clear.   Eyes:      General:         Right eye: No discharge.          Left eye: No discharge.      Extraocular Movements: Extraocular movements intact.      Conjunctiva/sclera: Conjunctivae normal.      Pupils: Pupils are equal, round, and reactive to light.   Neck:      Musculoskeletal: Neck supple.      Thyroid: No thyromegaly.   Cardiovascular:      Rate and Rhythm: Normal rate and regular rhythm.      Heart sounds: No murmur.   Pulmonary:      Effort: Pulmonary effort is normal. No respiratory distress.      Breath sounds: No wheezing, rhonchi or rales.   Abdominal:      General: Bowel sounds are normal. There is no distension.      Palpations: Abdomen is soft.      Tenderness: There is no abdominal tenderness.   Musculoskeletal:      Right lower leg: No edema.      Left lower leg: No edema.   Lymphadenopathy:      Cervical: No cervical adenopathy.   Skin:     General: Skin is warm and dry.   Neurological:      General: No focal deficit present.      Mental Status: She is alert.      Cranial Nerves: No cranial nerve deficit.      Comments: Obese, not very cooperative with PT.  Max assistance   Psychiatric:         Behavior: Behavior normal.         Significant Labs:   BMP:   Recent Labs   Lab 11/11/20  0535   GLU 94      K 3.3*   CL 99   CO2 29   BUN 14   CREATININE 1.0   CALCIUM 10.4   phos 3.7  Mag 1.9  Lab Results   Component Value Date    WBC 7.24 11/09/2020    HGB 9.9 (L) 11/09/2020    HCT 33.6 (L) 11/09/2020    MCV 91 11/09/2020     (H) 11/09/2020 11/9 vanc trough 16.5    MRI thoracic spine   1. Altered signal intensity changes at T9/T10 with evidence of erosive focus about the opposing endplates and evidence of enhancing phlegmonous formation identified in the central component of the disc with central low signal intensity compatible with osteomyelitis/discitis.  Vertical dimensions of the area of interest cause extreme erosion along the anterior portion of the vertebral bodies of interest without evidence of any significant paraspinal component.  2. No  significant soft tissue component.  3. Chronic degenerative spondylosis changes of the remaining segments of the thoracic spine.  4. Chronic degenerative changes of the entirety of the cervical spine.

## 2020-11-11 NOTE — PT/OT/SLP PROGRESS
"Physical Therapy Treatment    Patient Name:  Martina Betancourt   MRN:  [id number]    Recommendations:     Discharge Recommendations:  nursing facility, basic   Discharge Equipment Recommendations: lift device, hospital bed   Barriers to discharge: Decreased caregiver support    Assessment:     Martina Betancourt is a 70 y.o. female admitted with a medical diagnosis of Debility.  She presents with the following impairments/functional limitations:  weakness, impaired endurance, gait instability, impaired self care skills, impaired balance, impaired functional mobilty, decreased lower extremity function, decreased upper extremity function, pain. Patient is showiing steady progress in P Therapy. Limited tolerance on  static standing using  std walker due to increase pain at Right lower back and Right knee today.    Rehab Prognosis: Fair; patient would benefit from acute skilled PT services to address these deficits and reach maximum level of function.    Recent Surgery: * No surgery found *      Plan:     During this hospitalization, patient to be seen daily to address the identified rehab impairments via gait training, therapeutic activities, therapeutic exercises and progress toward the following goals:    · Plan of Care Expires:  12/03/20    Subjective     Chief Complaint:  Increasing pain at Right lower back and Right knee upon standing up.  Patient/Family Comments/goals: "To try to do better".  Pain/Comfort:  · Pain Rating 1: 8/10  · Location - Side 1: Right  · Location - Orientation 1: lower  · Location 1: back  · Pain Addressed 1: Reposition, Pre-medicate for activity, Cessation of Activity  · Pain Rating Post-Intervention 1: 8/10  · Pain Rating 2: 8/10  · Location - Side 2: Right  · Location - Orientation 2: lower  · Location 2: knee  · Pain Addressed 2: Pre-medicate for activity, Cessation of Activity  · Pain Rating Post-Intervention 2: 7/10      Objective:     Communicated with patient  prior to session.  Patient " found supine with PureWick, peripheral IV, oxygen upon PT entry to room.     General Precautions: Standard, fall   Orthopedic Precautions:N/A   Braces: N/A     Functional Mobility:  · Bed Mobility:     · Rolling Left:  contact guard assistance using bedrail  · Rolling Right: contact guard assistance using bedrail  · Scooting: contact guard assistance and cues  · Supine to Sit: minimal/contact guard assistance and cues  · Sit to Supine: moderate assistance and cues in ascending bilat LE  · Transfers:     · Sit to Stand:  moderate assistance and cues with standard walker  · Balance: Standing with Standard Walker Static: Fair- for 1 minute and 7 seconds with moderate assistance and cues.      AM-PAC 6 CLICK MOBILITY  Turning over in bed (including adjusting bedclothes, sheets and blankets)?: 3  Sitting down on and standing up from a chair with arms (e.g., wheelchair, bedside commode, etc.): 2  Moving from lying on back to sitting on the side of the bed?: 3  Moving to and from a bed to a chair (including a wheelchair)?: 2  Need to walk in hospital room?: 1  Climbing 3-5 steps with a railing?: 1  Basic Mobility Total Score: 12       Therapeutic Activities and Exercises:   Implemented bed mobility ex rolling to sides using bed rail and scooting x 5 reps on each; Bilat LE ex at sitting x 10 reps on each such as LAQ and hip flexion; Isometric ex such as adduction sets, gluteal sets ad quat sets; Standing balance trng and tolerance using std walker.    Patient left supine with all lines intact, call button in reach, bed alarm on and nursing notified..    GOALS:   Multidisciplinary Problems     Physical Therapy Goals        Problem: Physical Therapy Goal    Goal Priority Disciplines Outcome Goal Variances Interventions   Physical Therapy Goal     PT, PT/OT Ongoing, Progressing     Description: Goals to be met by: 11/10/20    Patient will increase functional independence with mobility by performin. Rolling to sides  using bed rail with contact guard assistance and cues  2. Supine to sit with minimal assistance and cues  3. Sit to supine with moderate assistance and cues  4. Tolerate Static Sit at side of the bed for 10 minutes with Standby Assistance  5. Bed to chair transfer with moderate assistance and cues using Slide Board TECHNIQUE  6. Lower extremity exercise program x15 reps per handout, with assistance as needed                      Time Tracking:     PT Received On: 11/11/20  PT Start Time: 1245     PT Stop Time: 1315  PT Total Time (min): 30 min     Billable Minutes: Therapeutic Activity 15 and Therapeutic Exercise 15    Treatment Type: Treatment  PT/PTA: PT     PTA Visit Number: 0     Edgar Lamas, PT  11/11/2020

## 2020-11-11 NOTE — ASSESSMENT & PLAN NOTE
vanc 1450mg IV every 24hr x 8 weeks total till 12/3 per ID ; will stay here for the duration   PT  vanc now 1000mg IV every 24hr till 12/3 per ID last vanc trough 10/31 15.8    Reach out to ns today for guidance on re-imaging  Noted per ID Plan for 6 - 8 weeks of IV antibiotics with repeat MRI at midpoint of treatment as no surgical drainage undertaken.  - ID will follow.  She is currently midpoint; will plan for MRI of thoracic on Monday .  Cont vanc now 750mg IV daily with trough 11/7 16.3 .    11/11 contacted neurosurgery for repeat MRI review. She is only able to stand at bedside with therapy for 1min and 15 sec with mod to max assist. She would be a difficult transfer at this point.

## 2020-11-11 NOTE — PLAN OF CARE
Problem: Adult Inpatient Plan of Care  Goal: Plan of Care Review  Outcome: Ongoing, Progressing  Goal: Patient-Specific Goal (Individualization)  Outcome: Ongoing, Progressing  Goal: Absence of Hospital-Acquired Illness or Injury  Outcome: Ongoing, Progressing  Goal: Optimal Comfort and Wellbeing  Outcome: Ongoing, Progressing  Goal: Readiness for Transition of Care  Outcome: Ongoing, Progressing  Goal: Rounds/Family Conference  Outcome: Ongoing, Progressing     Problem: Bariatric Environmental Safety  Goal: Safety Maintained with Care  Outcome: Ongoing, Progressing     Problem: Fluid Imbalance (Pneumonia)  Goal: Fluid Balance  Outcome: Ongoing, Progressing     Problem: Infection (Pneumonia)  Goal: Resolution of Infection Signs/Symptoms  Outcome: Ongoing, Progressing     Problem: Respiratory Compromise (Pneumonia)  Goal: Effective Oxygenation and Ventilation  Outcome: Ongoing, Progressing     Problem: Infection  Goal: Infection Symptom Resolution  Outcome: Ongoing, Progressing     Problem: Wound  Goal: Optimal Wound Healing  Outcome: Ongoing, Progressing     Problem: Fall Injury Risk  Goal: Absence of Fall and Fall-Related Injury  Outcome: Ongoing, Progressing     Problem: Skin Injury Risk Increased  Goal: Skin Health and Integrity  Outcome: Ongoing, Progressing    Patient received PRN hydrocodone for lower back pain. Afebrile throughout the night. PICC line to Rt upper arm dated for 11/9, to be changed 11/16. Unable to flush purple port. Blood return and sluggish flushing to red port. Purwick in place, changed @ 2140 11/10.

## 2020-11-12 LAB
ANION GAP SERPL CALC-SCNC: 10 MMOL/L (ref 8–16)
BASOPHILS # BLD AUTO: 0.04 K/UL (ref 0–0.2)
BASOPHILS NFR BLD: 0.6 % (ref 0–1.9)
BUN SERPL-MCNC: 14 MG/DL (ref 8–23)
CALCIUM SERPL-MCNC: 10.4 MG/DL (ref 8.7–10.5)
CHLORIDE SERPL-SCNC: 100 MMOL/L (ref 95–110)
CO2 SERPL-SCNC: 29 MMOL/L (ref 23–29)
CREAT SERPL-MCNC: 1 MG/DL (ref 0.5–1.4)
DIFFERENTIAL METHOD: ABNORMAL
EOSINOPHIL # BLD AUTO: 0.3 K/UL (ref 0–0.5)
EOSINOPHIL NFR BLD: 3.8 % (ref 0–8)
ERYTHROCYTE [DISTWIDTH] IN BLOOD BY AUTOMATED COUNT: 16.3 % (ref 11.5–14.5)
EST. GFR  (AFRICAN AMERICAN): >60 ML/MIN/1.73 M^2
EST. GFR  (NON AFRICAN AMERICAN): 56 ML/MIN/1.73 M^2
GLUCOSE SERPL-MCNC: 94 MG/DL (ref 70–110)
HCT VFR BLD AUTO: 34.7 % (ref 37–48.5)
HGB BLD-MCNC: 10.3 G/DL (ref 12–16)
IMM GRANULOCYTES # BLD AUTO: 0.02 K/UL (ref 0–0.04)
IMM GRANULOCYTES NFR BLD AUTO: 0.3 % (ref 0–0.5)
LYMPHOCYTES # BLD AUTO: 1.8 K/UL (ref 1–4.8)
LYMPHOCYTES NFR BLD: 26.1 % (ref 18–48)
MAGNESIUM SERPL-MCNC: 2.1 MG/DL (ref 1.6–2.6)
MCH RBC QN AUTO: 26.8 PG (ref 27–31)
MCHC RBC AUTO-ENTMCNC: 29.7 G/DL (ref 32–36)
MCV RBC AUTO: 90 FL (ref 82–98)
MONOCYTES # BLD AUTO: 0.8 K/UL (ref 0.3–1)
MONOCYTES NFR BLD: 12 % (ref 4–15)
NEUTROPHILS # BLD AUTO: 3.9 K/UL (ref 1.8–7.7)
NEUTROPHILS NFR BLD: 57.2 % (ref 38–73)
NRBC BLD-RTO: 0 /100 WBC
PHOSPHATE SERPL-MCNC: 3.4 MG/DL (ref 2.7–4.5)
PLATELET # BLD AUTO: 373 K/UL (ref 150–350)
PMV BLD AUTO: 9.4 FL (ref 9.2–12.9)
POTASSIUM SERPL-SCNC: 4 MMOL/L (ref 3.5–5.1)
RBC # BLD AUTO: 3.85 M/UL (ref 4–5.4)
SODIUM SERPL-SCNC: 139 MMOL/L (ref 136–145)
WBC # BLD AUTO: 6.77 K/UL (ref 3.9–12.7)

## 2020-11-12 PROCEDURE — 25000003 PHARM REV CODE 250: Performed by: INTERNAL MEDICINE

## 2020-11-12 PROCEDURE — 11000004 HC SNF PRIVATE

## 2020-11-12 PROCEDURE — 80048 BASIC METABOLIC PNL TOTAL CA: CPT

## 2020-11-12 PROCEDURE — A4216 STERILE WATER/SALINE, 10 ML: HCPCS | Performed by: INTERNAL MEDICINE

## 2020-11-12 PROCEDURE — 97535 SELF CARE MNGMENT TRAINING: CPT

## 2020-11-12 PROCEDURE — 25000003 PHARM REV CODE 250: Performed by: NURSE PRACTITIONER

## 2020-11-12 PROCEDURE — 25000003 PHARM REV CODE 250: Performed by: FAMILY MEDICINE

## 2020-11-12 PROCEDURE — 97530 THERAPEUTIC ACTIVITIES: CPT

## 2020-11-12 PROCEDURE — 94799 UNLISTED PULMONARY SVC/PX: CPT

## 2020-11-12 PROCEDURE — 27000221 HC OXYGEN, UP TO 24 HOURS

## 2020-11-12 PROCEDURE — 94640 AIRWAY INHALATION TREATMENT: CPT

## 2020-11-12 PROCEDURE — 83735 ASSAY OF MAGNESIUM: CPT

## 2020-11-12 PROCEDURE — 84100 ASSAY OF PHOSPHORUS: CPT

## 2020-11-12 PROCEDURE — 25000242 PHARM REV CODE 250 ALT 637 W/ HCPCS: Performed by: NURSE PRACTITIONER

## 2020-11-12 PROCEDURE — 63600175 PHARM REV CODE 636 W HCPCS: Performed by: FAMILY MEDICINE

## 2020-11-12 PROCEDURE — 85025 COMPLETE CBC W/AUTO DIFF WBC: CPT

## 2020-11-12 PROCEDURE — 94761 N-INVAS EAR/PLS OXIMETRY MLT: CPT

## 2020-11-12 RX ADMIN — DONEPEZIL HYDROCHLORIDE 10 MG: 5 TABLET, FILM COATED ORAL at 08:11

## 2020-11-12 RX ADMIN — METHOCARBAMOL TABLETS 500 MG: 500 TABLET, COATED ORAL at 12:11

## 2020-11-12 RX ADMIN — PRAVASTATIN SODIUM 40 MG: 40 TABLET ORAL at 08:11

## 2020-11-12 RX ADMIN — ACETAMINOPHEN 500 MG: 500 TABLET, FILM COATED ORAL at 08:11

## 2020-11-12 RX ADMIN — Medication 10 ML: at 12:11

## 2020-11-12 RX ADMIN — METHOCARBAMOL TABLETS 500 MG: 500 TABLET, COATED ORAL at 05:11

## 2020-11-12 RX ADMIN — HYDROCODONE BITARTRATE AND ACETAMINOPHEN 1 TABLET: 10; 325 TABLET ORAL at 12:11

## 2020-11-12 RX ADMIN — HYDROCODONE BITARTRATE AND ACETAMINOPHEN 1 TABLET: 10; 325 TABLET ORAL at 08:11

## 2020-11-12 RX ADMIN — MEMANTINE 10 MG: 10 TABLET ORAL at 08:11

## 2020-11-12 RX ADMIN — LISINOPRIL 10 MG: 10 TABLET ORAL at 08:11

## 2020-11-12 RX ADMIN — IPRATROPIUM BROMIDE AND ALBUTEROL SULFATE 3 ML: .5; 3 SOLUTION RESPIRATORY (INHALATION) at 07:11

## 2020-11-12 RX ADMIN — GUAIFENESIN 600 MG: 600 TABLET, EXTENDED RELEASE ORAL at 08:11

## 2020-11-12 RX ADMIN — ASPIRIN 81 MG: 81 TABLET, COATED ORAL at 08:11

## 2020-11-12 RX ADMIN — METHOCARBAMOL TABLETS 500 MG: 500 TABLET, COATED ORAL at 08:11

## 2020-11-12 RX ADMIN — FERROUS SULFATE TAB 325 MG (65 MG ELEMENTAL FE) 325 MG: 325 (65 FE) TAB at 08:11

## 2020-11-12 RX ADMIN — PANTOPRAZOLE SODIUM 40 MG: 40 TABLET, DELAYED RELEASE ORAL at 06:11

## 2020-11-12 RX ADMIN — CLOPIDOGREL 75 MG: 75 TABLET, FILM COATED ORAL at 08:11

## 2020-11-12 RX ADMIN — Medication 10 ML: at 11:11

## 2020-11-12 RX ADMIN — MICONAZOLE NITRATE: 20 OINTMENT TOPICAL at 08:11

## 2020-11-12 RX ADMIN — Medication 10 ML: at 05:11

## 2020-11-12 RX ADMIN — ACETAMINOPHEN 500 MG: 500 TABLET, FILM COATED ORAL at 03:11

## 2020-11-12 RX ADMIN — IPRATROPIUM BROMIDE AND ALBUTEROL SULFATE 3 ML: .5; 3 SOLUTION RESPIRATORY (INHALATION) at 01:11

## 2020-11-12 RX ADMIN — HYDROCODONE BITARTRATE AND ACETAMINOPHEN 1 TABLET: 10; 325 TABLET ORAL at 06:11

## 2020-11-12 RX ADMIN — MICONAZOLE NITRATE: 20 OINTMENT TOPICAL at 09:11

## 2020-11-12 RX ADMIN — METOPROLOL SUCCINATE 50 MG: 50 TABLET, EXTENDED RELEASE ORAL at 08:11

## 2020-11-12 RX ADMIN — FUROSEMIDE 40 MG: 40 TABLET ORAL at 08:11

## 2020-11-12 RX ADMIN — ISOSORBIDE MONONITRATE 60 MG: 60 TABLET, EXTENDED RELEASE ORAL at 08:11

## 2020-11-12 RX ADMIN — DULOXETINE 60 MG: 30 CAPSULE, DELAYED RELEASE ORAL at 08:11

## 2020-11-12 RX ADMIN — VANCOMYCIN HYDROCHLORIDE 750 MG: 750 INJECTION, POWDER, LYOPHILIZED, FOR SOLUTION INTRAVENOUS at 01:11

## 2020-11-12 NOTE — PT/OT/SLP PROGRESS
Occupational Therapy      Patient Name:  Martina Betancourt   MRN:  [id number]    Patient not seen today secondary to Patient unwilling to participate. Will follow-up as appropriate.    Golden Bartlett OT  11/11/2020

## 2020-11-12 NOTE — PATIENT CARE CONFERENCE
SWING bed Skilled Nursing Patient Care Conference.         Skilled level of care weekly patient care conference complete.   Patient will complete IV antibiotics 12/3/2020. Patient is still addiment about discharging home despite her slow progress with PT/OT. SW and CM will continue to follow case and assist with discharge plan.

## 2020-11-12 NOTE — PLAN OF CARE
Patient Agrees and Compliant with Plan of Care; Swing Patient.  Monitor Vital signs ; Vital signs stable.  Monitor Breathing Pattern; Bilateral Breath sounds auscultated Posteriorly earlier this shift with diminished breath sounds and crackles RML RLL. Patient on O2 at 3L nc. Oxygen saturation of 92%. No c/o shortness of breath noted.  Maintain Pain Regimen; Patient received Hydrocodone 10mg for c/o back pain X 1 this shift with relief of pain.   Administer IV Antibiotic as ordered . Patient receiving Vancomycin 750mg Q 24 hrs. Given on day shift.  Maintain PICC line in Right upper arm. One port flushing well . Unable to flush other port.  Maintain dressing change to PICC due on 11/16/20.  Maintain I/O's; Patient has PurWick working off and on.  Maintain skin integrity ; note redness to upper thigh region; Antifungal /Barrier cream to area. Note folds of abd. And area under breast healing well.  Fall Precautions ; Maintain Patient Safety; No falls or injury noted this shift.

## 2020-11-12 NOTE — PT/OT/SLP PROGRESS
"Physical Therapy Treatment    Patient Name:  Martina Betancourt   MRN:  [id number]    Recommendations:     Discharge Recommendations:  nursing facility, basic   Discharge Equipment Recommendations: hospital bed, lift device   Barriers to discharge: Decreased caregiver support    Assessment:     Martina Betancourt is a 70 y.o. female admitted with a medical diagnosis of Debility.  She presents with the following impairments/functional limitations:  weakness, impaired endurance, impaired self care skills, impaired functional mobilty, gait instability, pain, impaired balance, decreased lower extremity function, decreased upper extremity function. Patient gradually increased tolerance with static standing at bedside using Standard Walker today. Increasing pain at Right lower back and Right knee during standing is markedly noted.    Rehab Prognosis: Fair; patient would benefit from acute skilled PT services to address these deficits and reach maximum level of function.    Recent Surgery: * No surgery found *      Plan:     During this hospitalization, patient to be seen daily to address the identified rehab impairments via gait training, therapeutic activities, therapeutic exercises and progress toward the following goals:    · Plan of Care Expires:  12/03/20    Subjective     Chief Complaint: Right lower back and knee upon standing   Patient/Family Comments/goals: "to decrease the pain and do better"  Pain/Comfort:  · Pain Rating 1: 8/10  · Location - Side 1: Right  · Location - Orientation 1: lower  · Location 1: back  · Pain Addressed 1: Pre-medicate for activity, Reposition, Cessation of Activity  · Pain Rating Post-Intervention 1: 8/10  · Pain Rating 2: 8/10  · Location - Side 2: Right  · Location - Orientation 2: lower  · Location 2: knee  · Pain Addressed 2: Pre-medicate for activity, Cessation of Activity  · Pain Rating Post-Intervention 2: 8/10      Objective:     Communicated with patient  prior to session.  Patient " found supine with PureWick, peripheral IV, oxygen upon PT entry to room.     General Precautions: Standard, fall   Orthopedic Precautions:N/A   Braces: N/A     Functional Mobility:  · Bed Mobility:     · Rolling Left:  contact guard assistance and cues using bed rail  · Rolling Right: contact guard assistance and cues using bed rail  · Scooting: stand by assistance  · Supine to Sit: minimal assistance and cues  · Sit to Supine: moderate assistance and cues in ascending bilat LE  · Transfers:     · Sit to Stand:  Moderate assistance and cues with standard walker  · Balance: Standing with Std Walker Static: Poor+ for  1 minute and  20 seconds(1st attempt); 35 seconds(2nd attempt). woth Moderate Assistance and cues      AM-PAC 6 CLICK MOBILITY  Turning over in bed (including adjusting bedclothes, sheets and blankets)?: 3  Sitting down on and standing up from a chair with arms (e.g., wheelchair, bedside commode, etc.): 2  Moving from lying on back to sitting on the side of the bed?: 3  Moving to and from a bed to a chair (including a wheelchair)?: 2  Need to walk in hospital room?: 1  Climbing 3-5 steps with a railing?: 1  Basic Mobility Total Score: 12       Therapeutic Activities and Exercises:   Worked on body mechanics on bed mobility and sit to stand transfers followed by static standing tolerance ex. Educated patient with home ex program and proper bed positioning for pain mgt    Patient left supine with all lines intact, call button in reach, bed alarm on and nursing notified..    GOALS:   Multidisciplinary Problems     Physical Therapy Goals        Problem: Physical Therapy Goal    Goal Priority Disciplines Outcome Goal Variances Interventions   Physical Therapy Goal     PT, PT/OT Ongoing, Progressing     Description: Goals to be met by: 11/10/20    Patient will increase functional independence with mobility by performin. Rolling to sides using bed rail with contact guard assistance and cues  2. Supine to  sit with minimal assistance and cues  3. Sit to supine with moderate assistance and cues  4. Tolerate Static Sit at side of the bed for 10 minutes with Standby Assistance  5. Bed to chair transfer with moderate assistance and cues using Slide Board TECHNIQUE  6. Lower extremity exercise program x15 reps per handout, with assistance as needed                      Time Tracking:     PT Received On: 11/12/20  PT Start Time: 1349     PT Stop Time: 1420  PT Total Time (min): 31 min     Billable Minutes: Therapeutic Activity 31    Treatment Type: Treatment  PT/PTA: PT     PTA Visit Number: 0     Edgar Lamas, PT  11/12/2020

## 2020-11-12 NOTE — PT/OT/SLP PROGRESS
Occupational Therapy   Treatment    Name: Martina Betancourt  MRN: [id number]  Admitting Diagnosis:  Debility       Recommendations:     Discharge Recommendations: nursing facility, basic  Discharge Equipment Recommendations:  hospital bed, lift device  Barriers to discharge:       Assessment:     Martina Betancourt is a 70 y.o. female with a medical diagnosis of Debility.  She presents with good participation in bed level UBD and LBD. Performance deficits affecting function are weakness, gait instability, decreased upper extremity function, impaired cardiopulmonary response to activity, impaired endurance, impaired balance, decreased lower extremity function, decreased safety awareness, impaired self care skills, impaired cognition, pain, impaired functional mobilty, decreased coordination.     Rehab Prognosis:  Fair; patient would benefit from acute skilled OT services to address these deficits and reach maximum level of function.       Plan:     Patient to be seen 5 x/week to address the above listed problems via self-care/home management, therapeutic activities, therapeutic exercises  · Plan of Care Expires: 11/19/20  · Plan of Care Reviewed with: patient    Subjective     Pain/Comfort:  · Pain Rating 1: 8/10  · Location - Side 1: Right  · Location - Orientation 1: lower  · Location 1: back  · Pain Addressed 1: Reposition  · Pain Rating Post-Intervention 1: 6/10    Objective:     Communicated with: Nursing prior to session.  Patient found supine with peripheral IV, oxygen, PureWick upon OT entry to room.    General Precautions: Standard, fall   Orthopedic Precautions:N/A   Braces: N/A     Occupational Performance:     Bed Mobility:    · Patient completed Rolling/Turning to Left with  supervision  · Patient completed Rolling/Turning to Right with supervision  · Patient completed Scooting/Bridging with supervision  · Patient completed Supine to Sit with supervision  · Patient completed Sit to Supine with minimum  assistance     Functional Mobility/Transfers:  · Not completed, patient declined    Activities of Daily Living:  · Upper Body Dressing: modified independence sitting EOB  · Lower Body Dressing: moderate assistance Min A for shorts, Max A for socks  · Toileting: total assistance unable to wipe bed level      [facility] 6 Click ADL: 15    Treatment & Education:  Therapist educated patient on rolling for bed level toileting, and sequencing for dressing, sitting EOB to don shirt and short, supine to pull up shorts with max verbal cues, max to don socks    Patient left supine with all lines intact, call button in reach and nursing notifiedEducation:      GOALS:   Multidisciplinary Problems     Occupational Therapy Goals        Problem: Occupational Therapy Goal    Goal Priority Disciplines Outcome Interventions   Occupational Therapy Goal     OT, PT/OT Ongoing, Progressing    Description: Goals to be met by: 11/18/2020     Patient will increase functional independence with ADLs by performing:    Feeding with Supervision. (GOAL MET)  UE Dressing with Supervision (GOAL MET).  LE Dressing with Moderate Assistance (GOAL MET).  Grooming while seated with Supervision (GOAL MET).  Toileting from bedside commode with Minimal Assistance for hygiene and clothing management.   Sitting at edge of bed x5 minutes with Supervision. (GOAL MET)  Rolling to Bilateral with Supervision. (GOAL MET)  Supine to sit with Minimal Assistance. (GOAL MET)  Squat pivot transfers with Minimal Assistance.  Toilet transfer to bedside commode with Minimal Assistance.  Upper extremity exercise program x10 reps per handout, with assistance as needed. (GOAL MET)                     Time Tracking:     OT Date of Treatment: 11/12/20  OT Start Time: 1038  OT Stop Time: 1102  OT Total Time (min): 24 min    Billable Minutes:Self Care/Home Management 24 minutes    Golden Bartlett OT  11/12/2020

## 2020-11-13 LAB
ANION GAP SERPL CALC-SCNC: 10 MMOL/L (ref 8–16)
BUN SERPL-MCNC: 12 MG/DL (ref 8–23)
CALCIUM SERPL-MCNC: 10.4 MG/DL (ref 8.7–10.5)
CHLORIDE SERPL-SCNC: 100 MMOL/L (ref 95–110)
CO2 SERPL-SCNC: 29 MMOL/L (ref 23–29)
CREAT SERPL-MCNC: 1 MG/DL (ref 0.5–1.4)
EST. GFR  (AFRICAN AMERICAN): >60 ML/MIN/1.73 M^2
EST. GFR  (NON AFRICAN AMERICAN): 56 ML/MIN/1.73 M^2
GLUCOSE SERPL-MCNC: 95 MG/DL (ref 70–110)
POTASSIUM SERPL-SCNC: 3.6 MMOL/L (ref 3.5–5.1)
SODIUM SERPL-SCNC: 139 MMOL/L (ref 136–145)
VANCOMYCIN TROUGH SERPL-MCNC: 14.5 UG/ML (ref 10–22)

## 2020-11-13 PROCEDURE — 25000003 PHARM REV CODE 250: Performed by: FAMILY MEDICINE

## 2020-11-13 PROCEDURE — 25000003 PHARM REV CODE 250: Performed by: INTERNAL MEDICINE

## 2020-11-13 PROCEDURE — 25000242 PHARM REV CODE 250 ALT 637 W/ HCPCS: Performed by: NURSE PRACTITIONER

## 2020-11-13 PROCEDURE — A4216 STERILE WATER/SALINE, 10 ML: HCPCS | Performed by: INTERNAL MEDICINE

## 2020-11-13 PROCEDURE — 63600175 PHARM REV CODE 636 W HCPCS: Performed by: FAMILY MEDICINE

## 2020-11-13 PROCEDURE — 99309 SBSQ NF CARE MODERATE MDM 30: CPT | Mod: ,,, | Performed by: FAMILY MEDICINE

## 2020-11-13 PROCEDURE — 25000003 PHARM REV CODE 250: Performed by: NURSE PRACTITIONER

## 2020-11-13 PROCEDURE — 80048 BASIC METABOLIC PNL TOTAL CA: CPT

## 2020-11-13 PROCEDURE — 94640 AIRWAY INHALATION TREATMENT: CPT

## 2020-11-13 PROCEDURE — 99900035 HC TECH TIME PER 15 MIN (STAT)

## 2020-11-13 PROCEDURE — 11000004 HC SNF PRIVATE

## 2020-11-13 PROCEDURE — 97530 THERAPEUTIC ACTIVITIES: CPT

## 2020-11-13 PROCEDURE — 97110 THERAPEUTIC EXERCISES: CPT

## 2020-11-13 PROCEDURE — 27000221 HC OXYGEN, UP TO 24 HOURS

## 2020-11-13 PROCEDURE — 94761 N-INVAS EAR/PLS OXIMETRY MLT: CPT

## 2020-11-13 PROCEDURE — 36415 COLL VENOUS BLD VENIPUNCTURE: CPT

## 2020-11-13 PROCEDURE — 80202 ASSAY OF VANCOMYCIN: CPT

## 2020-11-13 PROCEDURE — 99309 PR NURSING FAC CARE, SUBSEQ, SIGNIF COMPLIC: ICD-10-PCS | Mod: ,,, | Performed by: FAMILY MEDICINE

## 2020-11-13 PROCEDURE — 94799 UNLISTED PULMONARY SVC/PX: CPT

## 2020-11-13 RX ADMIN — PANTOPRAZOLE SODIUM 40 MG: 40 TABLET, DELAYED RELEASE ORAL at 06:11

## 2020-11-13 RX ADMIN — HYDROCODONE BITARTRATE AND ACETAMINOPHEN 1 TABLET: 10; 325 TABLET ORAL at 11:11

## 2020-11-13 RX ADMIN — METHOCARBAMOL TABLETS 500 MG: 500 TABLET, COATED ORAL at 02:11

## 2020-11-13 RX ADMIN — DONEPEZIL HYDROCHLORIDE 10 MG: 5 TABLET, FILM COATED ORAL at 08:11

## 2020-11-13 RX ADMIN — METHOCARBAMOL TABLETS 500 MG: 500 TABLET, COATED ORAL at 05:11

## 2020-11-13 RX ADMIN — VANCOMYCIN HYDROCHLORIDE 750 MG: 750 INJECTION, POWDER, LYOPHILIZED, FOR SOLUTION INTRAVENOUS at 02:11

## 2020-11-13 RX ADMIN — IPRATROPIUM BROMIDE AND ALBUTEROL SULFATE 3 ML: .5; 3 SOLUTION RESPIRATORY (INHALATION) at 07:11

## 2020-11-13 RX ADMIN — IPRATROPIUM BROMIDE AND ALBUTEROL SULFATE 3 ML: .5; 3 SOLUTION RESPIRATORY (INHALATION) at 02:11

## 2020-11-13 RX ADMIN — ACETAMINOPHEN 500 MG: 500 TABLET, FILM COATED ORAL at 08:11

## 2020-11-13 RX ADMIN — FERROUS SULFATE TAB 325 MG (65 MG ELEMENTAL FE) 325 MG: 325 (65 FE) TAB at 08:11

## 2020-11-13 RX ADMIN — MICONAZOLE NITRATE: 20 OINTMENT TOPICAL at 08:11

## 2020-11-13 RX ADMIN — PRAVASTATIN SODIUM 40 MG: 40 TABLET ORAL at 08:11

## 2020-11-13 RX ADMIN — CLOPIDOGREL 75 MG: 75 TABLET, FILM COATED ORAL at 08:11

## 2020-11-13 RX ADMIN — LISINOPRIL 10 MG: 10 TABLET ORAL at 08:11

## 2020-11-13 RX ADMIN — ISOSORBIDE MONONITRATE 60 MG: 60 TABLET, EXTENDED RELEASE ORAL at 08:11

## 2020-11-13 RX ADMIN — METOPROLOL SUCCINATE 50 MG: 50 TABLET, EXTENDED RELEASE ORAL at 08:11

## 2020-11-13 RX ADMIN — DULOXETINE 60 MG: 30 CAPSULE, DELAYED RELEASE ORAL at 08:11

## 2020-11-13 RX ADMIN — ALUMINUM HYDROXIDE, MAGNESIUM HYDROXIDE, AND DIMETHICONE 30 ML: 400; 400; 40 SUSPENSION ORAL at 10:11

## 2020-11-13 RX ADMIN — ASPIRIN 81 MG: 81 TABLET, COATED ORAL at 08:11

## 2020-11-13 RX ADMIN — METHOCARBAMOL TABLETS 500 MG: 500 TABLET, COATED ORAL at 08:11

## 2020-11-13 RX ADMIN — FUROSEMIDE 40 MG: 40 TABLET ORAL at 08:11

## 2020-11-13 RX ADMIN — CALCIUM CARBONATE (ANTACID) CHEW TAB 500 MG 500 MG: 500 CHEW TAB at 09:11

## 2020-11-13 RX ADMIN — GUAIFENESIN 600 MG: 600 TABLET, EXTENDED RELEASE ORAL at 08:11

## 2020-11-13 RX ADMIN — ACETAMINOPHEN 500 MG: 500 TABLET, FILM COATED ORAL at 02:11

## 2020-11-13 RX ADMIN — MEMANTINE 10 MG: 10 TABLET ORAL at 08:11

## 2020-11-13 RX ADMIN — Medication 10 ML: at 05:11

## 2020-11-13 RX ADMIN — Medication 10 ML: at 06:11

## 2020-11-13 NOTE — SUBJECTIVE & OBJECTIVE
Review of Systems   Constitutional: Positive for activity change. Negative for fever.   Cardiovascular: Negative for chest pain.   Gastrointestinal: Negative for abdominal pain.   Musculoskeletal: Positive for back pain.   Neurological: Positive for weakness.   Psychiatric/Behavioral: Positive for confusion.     Objective:     Vital Signs (Most Recent):  Temp: 97.8 °F (36.6 °C) (11/13/20 0809)  Pulse: 76 (11/13/20 0809)  Resp: 20 (11/13/20 0809)  BP: (!) 119/57 (11/13/20 0809)  SpO2: (!) 94 % (11/13/20 0809) Vital Signs (24h Range):  Temp:  [97.8 °F (36.6 °C)-98.5 °F (36.9 °C)] 97.8 °F (36.6 °C)  Pulse:  [54-79] 76  Resp:  [14-20] 20  SpO2:  [92 %-95 %] 94 %  BP: (108-119)/(56-57) 119/57     Weight: 126.7 kg (279 lb 5.2 oz)  Body mass index is 40.08 kg/m².    Intake/Output Summary (Last 24 hours) at 11/13/2020 1054  Last data filed at 11/13/2020 0600  Gross per 24 hour   Intake 444 ml   Output 1400 ml   Net -956 ml      Physical Exam  Vitals signs and nursing note reviewed.   Constitutional:       General: She is not in acute distress.     Appearance: She is well-developed. She is obese.   HENT:      Head: Normocephalic and atraumatic.      Nose: Nose normal.      Mouth/Throat:      Mouth: Mucous membranes are moist.      Pharynx: Oropharynx is clear.   Eyes:      Extraocular Movements: Extraocular movements intact.      Conjunctiva/sclera: Conjunctivae normal.      Pupils: Pupils are equal, round, and reactive to light.   Neck:      Musculoskeletal: Neck supple.      Thyroid: No thyromegaly.   Cardiovascular:      Rate and Rhythm: Normal rate and regular rhythm.      Pulses: Normal pulses.      Heart sounds: No murmur. No friction rub. No gallop.    Pulmonary:      Effort: Pulmonary effort is normal. No respiratory distress.      Breath sounds: No wheezing, rhonchi or rales.   Abdominal:      General: Bowel sounds are normal.      Palpations: Abdomen is soft.   Musculoskeletal:      Right lower leg: No edema.       Left lower leg: No edema.   Lymphadenopathy:      Cervical: No cervical adenopathy.   Skin:     General: Skin is warm and dry.   Neurological:      General: No focal deficit present.      Mental Status: She is alert.      Cranial Nerves: No cranial nerve deficit.      Motor: Weakness present.      Gait: Gait abnormal.      Comments: Obese, not very cooperative with PT.  Max assistance   Psychiatric:         Mood and Affect: Mood normal.         Behavior: Behavior normal.         Significant Labs:   BMP:   Recent Labs   Lab 11/12/20  0713 11/13/20  0549   GLU 94 95    139   K 4.0 3.6    100   CO2 29 29   BUN 14 12   CREATININE 1.0 1.0   CALCIUM 10.4 10.4   MG 2.1  --    phos 3.7  Mag 1.9  Lab Results   Component Value Date    WBC 6.77 11/12/2020    HGB 10.3 (L) 11/12/2020    HCT 34.7 (L) 11/12/2020    MCV 90 11/12/2020     (H) 11/12/2020 11/9 vanc trough 16.5    MRI thoracic spine   1. Altered signal intensity changes at T9/T10 with evidence of erosive focus about the opposing endplates and evidence of enhancing phlegmonous formation identified in the central component of the disc with central low signal intensity compatible with osteomyelitis/discitis.  Vertical dimensions of the area of interest cause extreme erosion along the anterior portion of the vertebral bodies of interest without evidence of any significant paraspinal component.  2. No significant soft tissue component.  3. Chronic degenerative spondylosis changes of the remaining segments of the thoracic spine.  4. Chronic degenerative changes of the entirety of the cervical spine.

## 2020-11-13 NOTE — PT/OT/SLP PROGRESS
"Physical Therapy Treatment    Patient Name:  Martina Betancourt   MRN:  [id number]    Recommendations:     Discharge Recommendations:  nursing facility, basic   Discharge Equipment Recommendations: power chair, lift device   Barriers to discharge: Decreased caregiver support    Assessment:     Martina Betancourt is a 70 y.o. female admitted with a medical diagnosis of Debility.  She presents with the following impairments/functional limitations:  weakness, impaired endurance, impaired self care skills, impaired functional mobilty, gait instability, decreased lower extremity function, decreased upper extremity function, impaired balance, pain, decreased safety awareness. Patient has increased static stand tolerance using std walker today. Also noted rolling much better on the bed using bed rail for repositioning and help manage back pain. Patient remains increasing pain  at Right lower back during standing.    Rehab Prognosis: Fair; patient would benefit from acute skilled PT services to address these deficits and reach maximum level of function.    Recent Surgery: * No surgery found *      Plan:     During this hospitalization, patient to be seen daily to address the identified rehab impairments via gait training, therapeutic activities, therapeutic exercises and progress toward the following goals:    · Plan of Care Expires:  12/03/20    Subjective     Chief Complaint: Pain at right lower back and knee; R knee giving out at standing.  Patient/Family Comments/goals: "To decrease the pain and do better in Therapy".  Pain/Comfort:  · Pain Rating 1: 8/10  · Location - Side 1: Right  · Location - Orientation 1: lower  · Location 1: back  · Pain Addressed 1: Pre-medicate for activity, Reposition, Cessation of Activity  · Pain Rating Post-Intervention 1: 8/10  · Pain Rating 2: 6/10  · Location - Side 2: Right  · Location - Orientation 2: lower  · Location 2: knee  · Pain Addressed 2: Pre-medicate for activity, Cessation of " Activity  · Pain Rating Post-Intervention 2: 6/10      Objective:     Communicated with patient  prior to session.  Patient found supine with peripheral IV, oxygen upon PT entry to room.     General Precautions: Standard, fall   Orthopedic Precautions:N/A   Braces: N/A     Functional Mobility:  · Bed Mobility:     · Rolling Left:  standby assistance and cues uisng bed rail  · Rolling Right: standby assistance and cues uisng bed rail  · Scooting: stand by assistance  · Supine to Sit: minimal/contact guard assistance  · Sit to Supine: moderate assistance and cues in ascending bilat LE  · Transfers:     · Sit to Stand:  moderate/minimal assistance and cues  with standard walker  · Balance: Standing with Std Walker Static: Fair- for 2 minutes tolerance(1st attempt) and  30 seconds( 2nd attempt).      AM-PAC 6 CLICK MOBILITY  Turning over in bed (including adjusting bedclothes, sheets and blankets)?: 3  Sitting down on and standing up from a chair with arms (e.g., wheelchair, bedside commode, etc.): 2  Moving from lying on back to sitting on the side of the bed?: 3  Moving to and from a bed to a chair (including a wheelchair)?: 2  Need to walk in hospital room?: 1  Climbing 3-5 steps with a railing?: 1  Basic Mobility Total Score: 12       Therapeutic Activities and Exercises:   Worked bed mobility ex rolling to sides using bedrail x 5 reps on each side, scooting x 4, arm push ups x 5; Quad sets and gluteal sets x 5 reps for pain mgt: Sit to Stand trng  And Static stand balance and tolerance with std walker.    Patient left supine with all lines intact, call button in reach, bed alarm on and nursing notified..    GOALS:   Multidisciplinary Problems     Physical Therapy Goals        Problem: Physical Therapy Goal    Goal Priority Disciplines Outcome Goal Variances Interventions   Physical Therapy Goal     PT, PT/OT Ongoing, Progressing     Description: Goals to be met by: 11/10/20    Patient will increase functional  independence with mobility by performin. Rolling to sides using bed rail with contact guard assistance and cues  2. Supine to sit with minimal assistance and cues  3. Sit to supine with moderate assistance and cues  4. Tolerate Static Sit at side of the bed for 10 minutes with Standby Assistance  5. Bed to chair transfer with moderate assistance and cues using Slide Board TECHNIQUE  6. Lower extremity exercise program x15 reps per handout, with assistance as needed                      Time Tracking:     PT Received On: 20  PT Start Time: 1332     PT Stop Time: 1400  PT Total Time (min): 28 min     Billable Minutes: Therapeutic Activity 28    Treatment Type: Treatment  PT/PTA: PT     PTA Visit Number: 0     Edgar Lamas, PT  2020

## 2020-11-13 NOTE — PLAN OF CARE
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  Following aspiration biopsy, admitted here for long-term antibiotics. Patient on Vancomycin daily.  Trough to be drawn tomorrow at 12:30.     Right brachial PICC line, double lumen.  Red port flushes with blood return, purple lumen clotted.  Patient working with PT/OT, working on sitting on edge of bed and standing for short periods to assist with wheelchair transfer upon discharge.   Pain controlled with PRN Norco, 10mg.    Potassium level improved today at 4.0 after supplementing with oral medication yesterday.  Patient on Purewick for skin breakdown prevention.   Turn every 2 hours for skin integrity.  Buttock and groin reddened with sensitivity due to frequent BM's.

## 2020-11-13 NOTE — ASSESSMENT & PLAN NOTE
vanc 1450mg IV every 24hr x 8 weeks total till 12/3 per ID ; will stay here for the duration   PT  vanc now 1000mg IV every 24hr till 12/3 per ID last vanc trough 10/31 15.8    Reach out to ns today for guidance on re-imaging  Noted per ID Plan for 6 - 8 weeks of IV antibiotics with repeat MRI at midpoint of treatment as no surgical drainage undertaken.  - ID will follow.  She is currently midpoint; will plan for MRI of thoracic on Monday .  Cont vanc now 750mg IV daily with trough 11/7 16.3 .  11/13 remains max assist; can stand for 1m 20sec; awaiting ID assessment of MRI    11/11 contacted neurosurgery for repeat MRI review. She is only able to stand at bedside with therapy for 1min and 15 sec with mod to max assist. She would be a difficult transfer at this point.

## 2020-11-13 NOTE — PROGRESS NOTES
[facility] Medicine  Progress Note    Patient Name: Martina Betancourt  MRN: [id number]  Patient Class: IP- Swing   Admission Date: 10/20/2020  Length of Stay: 24 days  Attending Physician: Vamsi Manuel MD  Primary Care Provider: Joe Fuller Iii, MD        Subjective:     Principal Problem:Debility        HPI:  71yo female patient with hx of alzheiners, CAD, HLD, HTN, myopathy, MI, obesity, sleep apnea and OA (knees non ambulatory uses gonzalez round). She was living at home with her daughter. Recently admitted to [facility] with MRSA bacteremia and subsequent pneumonia treated with Zyvox x 7 days with clearance of her bacteremia now admitted with back pain found to have T9-10 osteo discitis, T8-10 epidural phlegmon with associated paraverterbral abscesses. Spine infection likely hematogenous from under treated bacteremia. Neurosurgery has been consulted. She has been on Vancomycin and Ceftriaxone. Underwent T9-10 disc space biopsy with IR on 10/16. Cultures negative, though had been on IV antibiotics multiple days prior. Afebrile. HDS. Repeat blood cx sterile. ID rec cont vanc 1750mg q 24 till 12/3.     Overview/Hospital Course:  10/23 Here for skilled ; needing IV abx for spinal abscess;  vanc 1750mg q 24 till 12/3  Afebrile , 3LNC - O2 sat 95%   + debility, very deconditioned; bilt LE x 10 reps followed by bed mobility trng and static sit balance and tolerance at side of the bed  C/o back pain with activity; Occupational therapist notes that she is very resistant to movement and c/o severe pain with minimal activity.   Will order toradol 15mg x 1dose IV; pt did much better after toradol rx today per OT. Will order daily prior to OT as tolerated  Monitor renal fx     10/26 here for skilled therapy and cont IV abc. Vanc 1750mg iv every 12hr. Noted elevated WBC this am and she report that she has had diarrhea for the last 4 days.   · PT reports Supine to Sit: maximal assistance, of 2  persons and patient able to reach with UE to push through rail and with improved push off with UE and initiation  · Sit to Supine: maximal assistance of 2 people with improved ability of patient to control descent of upper body  · Transfers:     · Sit to Stand:  moderate assistance, maximal assistance of 2 persons with no AD.  PT and PT tech blocking both knees while assisting pt to elevate hips from bed  Balance: pt able to stand x15 seconds EOB limited largely by knee and back pain.       10/28  She is still on vanc IV daily. No longer with diarrhea. Did have heme positive stools H/H stable on lovenox for DVT prophylaxis and plavix. Will monitor h/h M/Thurs. No fever. No elevated WBC  pt is really immobile.  · Bed Mobility:     · Rolling Left:  moderate assistance  · Rolling Right: moderate assistance  · Scooting: maximal assistance  · Supine to Sit: maximum/moderate assistace and cues  · Sit to Supine: maximum assistace x 1-2 and cues  · Transfers:     · Sit to Stand:  maximum assistance and cues inside the parallel bars with facilitation tech  · Bed to Chair: to wheelchair  with  maximum assistance and cues  using  Slide Board  · Balance: Standing Static inside the parallel bars with Poor grade. Tolerated for ~10 seconds with 2 attempts with  Maximum assistance and cues.  Cont PT daily  10/30  IV  vanc 1,000mg q 24hr x 8 weeks total till 12/3 per ID for discitis; random vanc 12.5 yesterday    No longer with diarrhea. Did have heme positive stools H/H stable on lovenox for DVT prophylaxis and plavix. H&H stable, lovenox stopped. No fever. No elevated WBC. She has productive cough this am. K+ 3.2 yesterday, getting lasix, will repeat KCL 40meq today   pt is really immobile.Standing with RW Static: Poor for ~15 seconds tolerance with max Assistance and cues  Has PICC line- one port clotted;       11/2 she remains on vanc 1000mg  q 24hr x 8 weeks total till 12/3 per ID for discitis. Vanc trough 15.8 10/31/20.  Vss/afebrile. intermittent back pain with prn pain meds helping. She is not doing much with PT. She uses gonzalez round at home but can transfer independently. Here she is max assist     11/4 Remains on vanc 1000mg  q 24hr x 8 weeks total till 12/3 per ID for discitis. Vanc trough 16.6 on 11/2   Afebrile , having regular BMs; getting Norco 10mg q 6   · Continues to require max assist; Sit to Stand:  Max/moderate assistance and cues with standard walker  Balance: Static Stand with Std Walker: Fair- with 2 mins and 7 seconds  tolerance and max/moderate assistance and cues  11/4 Remains on vanc 1000mg  q 24hr x 8 weeks total till 12/3 per ID for discitis. Vanc trough 16.6 on 11/2   Afebrile, WBC nml, creat stable , K+3.4  Requires maximal assist but finally standing with PT with walker .    11/9/20    Remains on vanc 1000mg  q 24hr x 8 weeks total till 12/3 per ID for discitis. VSS/afebrile. No elevated WBC. Last vanc trough 16.3 11/7  She is progressing with PT now min assist from sit to supine and mod assist sit to stand with RW,    11/11 remains on vanc 750mg IV every 24hr, dose monitored by pharm. Last vanc trough 16.5 11/9. Repeat MRI done Monday. Contact with neurosurgery sent to discuss comparison with original johny since she is mid way on IV abx and had no intervention. She has no comoplaints. VSS/labs reviewed. She is standing at bedside 1min and 15 sec with mod assist    11/13 remains on vanc 750mg IV every 24hr, dose monitored by pharm. Last vanc trough 14.8  11/11. Repeat MRI done Monday; mid tx as per ID recommendations;  messaged ID, shows severe Degenerative disease of spine   Able to stand now for over 1m with PT       Review of Systems   Constitutional: Positive for activity change. Negative for fever.   Cardiovascular: Negative for chest pain.   Gastrointestinal: Negative for abdominal pain.   Musculoskeletal: Positive for back pain.   Neurological: Positive for weakness.   Psychiatric/Behavioral:  Positive for confusion.     Objective:     Vital Signs (Most Recent):  Temp: 97.8 °F (36.6 °C) (11/13/20 0809)  Pulse: 76 (11/13/20 0809)  Resp: 20 (11/13/20 0809)  BP: (!) 119/57 (11/13/20 0809)  SpO2: (!) 94 % (11/13/20 0809) Vital Signs (24h Range):  Temp:  [97.8 °F (36.6 °C)-98.5 °F (36.9 °C)] 97.8 °F (36.6 °C)  Pulse:  [54-79] 76  Resp:  [14-20] 20  SpO2:  [92 %-95 %] 94 %  BP: (108-119)/(56-57) 119/57     Weight: 126.7 kg (279 lb 5.2 oz)  Body mass index is 40.08 kg/m².    Intake/Output Summary (Last 24 hours) at 11/13/2020 1054  Last data filed at 11/13/2020 0600  Gross per 24 hour   Intake 444 ml   Output 1400 ml   Net -956 ml      Physical Exam  Vitals signs and nursing note reviewed.   Constitutional:       General: She is not in acute distress.     Appearance: She is well-developed. She is obese.   HENT:      Head: Normocephalic and atraumatic.      Nose: Nose normal.      Mouth/Throat:      Mouth: Mucous membranes are moist.      Pharynx: Oropharynx is clear.   Eyes:      Extraocular Movements: Extraocular movements intact.      Conjunctiva/sclera: Conjunctivae normal.      Pupils: Pupils are equal, round, and reactive to light.   Neck:      Musculoskeletal: Neck supple.      Thyroid: No thyromegaly.   Cardiovascular:      Rate and Rhythm: Normal rate and regular rhythm.      Pulses: Normal pulses.      Heart sounds: No murmur. No friction rub. No gallop.    Pulmonary:      Effort: Pulmonary effort is normal. No respiratory distress.      Breath sounds: No wheezing, rhonchi or rales.   Abdominal:      General: Bowel sounds are normal.      Palpations: Abdomen is soft.   Musculoskeletal:      Right lower leg: No edema.      Left lower leg: No edema.   Lymphadenopathy:      Cervical: No cervical adenopathy.   Skin:     General: Skin is warm and dry.   Neurological:      General: No focal deficit present.      Mental Status: She is alert.      Cranial Nerves: No cranial nerve deficit.      Motor: Weakness  present.      Gait: Gait abnormal.      Comments: Obese, not very cooperative with PT.  Max assistance   Psychiatric:         Mood and Affect: Mood normal.         Behavior: Behavior normal.         Significant Labs:   BMP:   Recent Labs   Lab 11/12/20  0713 11/13/20  0549   GLU 94 95    139   K 4.0 3.6    100   CO2 29 29   BUN 14 12   CREATININE 1.0 1.0   CALCIUM 10.4 10.4   MG 2.1  --    phos 3.7  Mag 1.9  Lab Results   Component Value Date    WBC 6.77 11/12/2020    HGB 10.3 (L) 11/12/2020    HCT 34.7 (L) 11/12/2020    MCV 90 11/12/2020     (H) 11/12/2020 11/9 vanc trough 16.5    MRI thoracic spine   1. Altered signal intensity changes at T9/T10 with evidence of erosive focus about the opposing endplates and evidence of enhancing phlegmonous formation identified in the central component of the disc with central low signal intensity compatible with osteomyelitis/discitis.  Vertical dimensions of the area of interest cause extreme erosion along the anterior portion of the vertebral bodies of interest without evidence of any significant paraspinal component.  2. No significant soft tissue component.  3. Chronic degenerative spondylosis changes of the remaining segments of the thoracic spine.  4. Chronic degenerative changes of the entirety of the cervical spine.      Assessment/Plan:      * Debility  Was walking with walker prior to pneumonia/bacteremia admit last month then after d.c was only w/c bound (gonzalez round) will add pt here and try and get her as strong as poss as she lives at home with family.   10/23 Chronic co back ache but also has recent high ankle fx ; per EDDIE Vinson NP Spoke with Ariela VASQUES who will see patient while on SWING. She looked over x rays and hx and reports that patient can weight bear as tolerated. Nurse/OT/MD notified    · 10/26 Supine to Sit: maximal assistance, of 2 persons and patient able to reach with UE to push through rail and with improved push  off with UE and initiation  · Sit to Supine: maximal assistance of 2 people with improved ability of patient to control descent of upper body  · Transfers:     · Sit to Stand:  moderate assistance, maximal assistance of 2 persons with no AD.  PT and PT tech blocking both knees while assisting pt to elevate hips from bed  Balance: pt able to stand x15 seconds EOB limited largely by knee and back pain.     10/28.  · Bed Mobility:     · Rolling Left:  moderate assistance  · Rolling Right: moderate assistance  · Scooting: maximal assistance  · Supine to Sit: maximum/moderate assistace and cues  · Sit to Supine: maximum assistace x 1-2 and cues  · Transfers:     · Sit to Stand:  maximum assistance and cues inside the parallel bars with facilitation tech  · Bed to Chair: to wheelchair  with  maximum assistance and cues  using  Slide Board  · Balance: Standing Static inside the parallel bars with Poor grade. Tolerated for ~10 seconds with 2 attempts with  Maximum assistance and cues.  10/30 Standing with RW Static: Poor for ~15 seconds tolerance with max Assistance and cues  11/2  wheelchair maximum assistance x 2 and cues  with  slide board  using  slide/scoot tech  11/4 Sit to Stand:  Max/moderate assistance and cues with standard walker  Balance: Static Stand with Std Walker: Fair- with 2 mins and 7 seconds  tolerance and max/moderate assistance and cues  · 11/6 max/moderate assistance and cues  with standard walker  Balance: Standing Static with std walker: Poor+ for 1 minute and 15 seconds(1st attempt); 45 seconds(2nd attempt)    11/9 she is becoming stronger  · Cont with PT  · Rolling Left:  stand by assistance  · Rolling Right: stand by assistance  · Scooting: stand by assistance  · Supine to Sit: contact guard assistance  · Sit to Supine: minimum assistance  · Transfers:     Sit to Stand:  moderate assistance with rolling walker.3    11/13/20 Transfers:     · Sit to Stand:  Moderate assistance and cues with  "standard walker  Balance: Standing with Std Walker Static: Poor+ for  1 minute and  20 seconds(1st attempt); 35 seconds(2nd attempt). woth Moderate Assistance and cues    Cough productive of purulent sputum  Productive cough this am per nurse- " pale green, grey"   Add mucinex and give neb tx q 6 while awake  No fever, WBC normal   Increase activity ..  11/9 Laying flat today. No complaints SOB./cough.        Diarrhea  Stools d/c as diarrhea has improved. + heme occult . lovenox stopped cbc stable .    Malnutrition of mild degree  Dietary.  Boost.      Hydronephrosis  Seen on CT 10/7 - u/a was negative .      CAD (coronary artery disease)  Cont asa, plavix, lasix, isosorbide, lisinopril, toprol and statin.      HTN (hypertension)  Increase lisinopril 2.5>10mg daily  10/23 SBP 160s at times; lisinopril just increased will give more time for lisinopril to work before titration  10/26 SBP 140s; cont to monitor  10/28 /80- increase lisinopril  10/30 BP much better 119/59- 138/62  .  11/2 /58.  VSS   11/9 /56- well controlled (cont lisinopril, lasix, isosorbide, metoprolol).    Discitis  vanc 1450mg IV every 24hr x 8 weeks total till 12/3 per ID ; will stay here for the duration   PT  vanc now 1000mg IV every 24hr till 12/3 per ID last vanc trough 10/31 15.8    Reach out to ns today for guidance on re-imaging  Noted per ID Plan for 6 - 8 weeks of IV antibiotics with repeat MRI at midpoint of treatment as no surgical drainage undertaken.  - ID will follow.  She is currently midpoint; will plan for MRI of thoracic on Monday .  Cont vanc now 750mg IV daily with trough 11/7 16.3 .  11/13 remains max assist; can stand for 1m 20sec; awaiting ID assessment of MRI    11/11 contacted neurosurgery for repeat MRI review. She is only able to stand at bedside with therapy for 1min and 15 sec with mod to max assist. She would be a difficult transfer at this point.     Severe obesity (BMI >= 40)  Using boost as she " has low appetite and low protein       Obstructive sleep apnea treated with continuous positive airway pressure (CPAP)  continue home cpap      Dementia without behavioral disturbance  Cont namenda and aricept .      VTE Risk Mitigation (From admission, onward)    None          Discharge Planning   BRIT: 12/3/2020     Code Status: DNR   Is the patient medically ready for discharge?:     Reason for patient still in hospital (select all that apply): Treatment  Discharge Plan A: Home with family, Home Health                  Vamsi Manuel MD  Department of Hospital Medicine   [facility]

## 2020-11-13 NOTE — PLAN OF CARE
Patient Agrees and Compliant with Plan of Care; Swing Patient.  Monitor Breathing Pattern; Patient on oxygen at 3L nc . Oxygen saturations of 92-95%. Bilateral Breath sounds auscultated posteriorly and diminished throughout.  Maintain Pain Regimen; Patient received Hydrocodone X 1 this shift for c/o back pain.  Monitor Vital Signs; Vital Signs Stable this shift.  Administer IV Antibiotic as ordered; Patient receiving Vancomycin 750mg Q 24hrs. Dose given on Day shift.  Monitor Vancomycin Trough as ordered; Next Vancomycin Trough due today at 1230PM.   Maintain PICC line to upper right arm. One port flushing well; Unable to flush the other port.  Maintain Skin Integrity; Note redness to folds of Abdomen and upper thigh regions; Area cleaned well, Dried and Antifungal /Barrier cream to areas. Sacral area red.  Patient Encouraged to turn. Patient repositioned Q 2 hrs this shift.  Fall Precautions; Maintain Patient Safety; No falls or injury noted this shift.

## 2020-11-13 NOTE — ASSESSMENT & PLAN NOTE
Was walking with walker prior to pneumonia/bacteremia admit last month then after d.c was only w/c bound (gonzalez round) will add pt here and try and get her as strong as poss as she lives at home with family.   10/23 Chronic co back ache but also has recent high ankle fx ; per EDDIE Vinson NP Spoke with Ariela VASQUES who will see patient while on SWING. She looked over x rays and hx and reports that patient can weight bear as tolerated. Nurse/OT/MD notified    · 10/26 Supine to Sit: maximal assistance, of 2 persons and patient able to reach with UE to push through rail and with improved push off with UE and initiation  · Sit to Supine: maximal assistance of 2 people with improved ability of patient to control descent of upper body  · Transfers:     · Sit to Stand:  moderate assistance, maximal assistance of 2 persons with no AD.  PT and PT tech blocking both knees while assisting pt to elevate hips from bed  Balance: pt able to stand x15 seconds EOB limited largely by knee and back pain.     10/28.  · Bed Mobility:     · Rolling Left:  moderate assistance  · Rolling Right: moderate assistance  · Scooting: maximal assistance  · Supine to Sit: maximum/moderate assistace and cues  · Sit to Supine: maximum assistace x 1-2 and cues  · Transfers:     · Sit to Stand:  maximum assistance and cues inside the parallel bars with facilitation tech  · Bed to Chair: to wheelchair  with  maximum assistance and cues  using  Slide Board  · Balance: Standing Static inside the parallel bars with Poor grade. Tolerated for ~10 seconds with 2 attempts with  Maximum assistance and cues.  10/30 Standing with RW Static: Poor for ~15 seconds tolerance with max Assistance and cues  11/2  wheelchair maximum assistance x 2 and cues  with  slide board  using  slide/scoot tech  11/4 Sit to Stand:  Max/moderate assistance and cues with standard walker  Balance: Static Stand with Std Walker: Fair- with 2 mins and 7 seconds  tolerance and  max/moderate assistance and cues  · 11/6 max/moderate assistance and cues  with standard walker  Balance: Standing Static with std walker: Poor+ for 1 minute and 15 seconds(1st attempt); 45 seconds(2nd attempt)    11/9 she is becoming stronger  · Cont with PT  · Rolling Left:  stand by assistance  · Rolling Right: stand by assistance  · Scooting: stand by assistance  · Supine to Sit: contact guard assistance  · Sit to Supine: minimum assistance  · Transfers:     Sit to Stand:  moderate assistance with rolling walker.3    11/13/20 Transfers:     · Sit to Stand:  Moderate assistance and cues with standard walker  Balance: Standing with Std Walker Static: Poor+ for  1 minute and  20 seconds(1st attempt); 35 seconds(2nd attempt). woth Moderate Assistance and cues

## 2020-11-13 NOTE — PT/OT/SLP PROGRESS
Occupational Therapy   Treatment    Name: Martina Betancourt  MRN: [id number]  Admitting Diagnosis:  Debility       Recommendations:     Discharge Recommendations: nursing facility, basic  Discharge Equipment Recommendations:  power chair, lift device  Barriers to discharge:  Decreased caregiver support    Assessment:     Martina Betancourt is a 70 y.o. female with a medical diagnosis of Debility.  She presents with the following performance deficits affecting function are weakness, impaired endurance, impaired self care skills, impaired functional mobilty, gait instability, decreased lower extremity function, decreased upper extremity function, impaired balance, pain, decreased safety awareness. Pt with decreased endurance during this session today. Pt tolerated therapeutic exercises while in bed today.     Rehab Prognosis:  Fair; patient would benefit from acute skilled OT services to address these deficits and reach maximum level of function.       Plan:     Patient to be seen 5 x/week to address the above listed problems via self-care/home management, therapeutic activities, therapeutic exercises  · Plan of Care Expires: 11/19/20  · Plan of Care Reviewed with: patient    Subjective     Pain/Comfort:  · Pain Rating 1: 8/10  · Location - Side 1: Right  · Location - Orientation 1: lower  · Location 1: back  · Pain Addressed 1: Reposition, Distraction, Nurse notified    Objective:     Communicated with: nursing prior to session.  Patient found supine with oxygen, peripheral IV upon OT entry to room.    General Precautions: Standard, fall   Orthopedic Precautions:N/A   Braces: N/A     Occupational Performance:     Bed Mobility:    · Not completed today    Functional Mobility/Transfers:  · Not completed today    Activities of Daily Living:  · Grooming: supervision with set up for face washing      AMPA 6 Click ADL: 15    Treatment & Education:  Pt initially agreeable to sitting up at EOB for session but after attempting to  sit up the patient started complaining of pain and refused to sit up. Pt agreeable to completing face washing and therapeutic exercises while in bed. Pt completed 3x10 shoulder flexion with AAROM and biceps curls. Pt only able to complete 1x5 chest press due to fatigue.     Patient left supine with all lines intact, call button in reach and bed alarm onEducation:      GOALS:   Multidisciplinary Problems     Occupational Therapy Goals        Problem: Occupational Therapy Goal    Goal Priority Disciplines Outcome Interventions   Occupational Therapy Goal     OT, PT/OT Ongoing, Progressing    Description: Goals to be met by: 11/18/2020     Patient will increase functional independence with ADLs by performing:    Feeding with Supervision. (GOAL MET)  UE Dressing with Supervision (GOAL MET).  LE Dressing with Moderate Assistance (GOAL MET).  Grooming while seated with Supervision (GOAL MET).  Toileting from bedside commode with Minimal Assistance for hygiene and clothing management.   Sitting at edge of bed x5 minutes with Supervision. (GOAL MET)  Rolling to Bilateral with Supervision. (GOAL MET)  Supine to sit with Minimal Assistance. (GOAL MET)  Squat pivot transfers with Minimal Assistance.  Toilet transfer to bedside commode with Minimal Assistance.  Upper extremity exercise program x10 reps per handout, with assistance as needed. (GOAL MET)                     Time Tracking:     OT Date of Treatment: 11/13/20  OT Start Time: 1440  OT Stop Time: 1450  OT Total Time (min): 10 min    Billable Minutes:Therapeutic Exercise 10    Addis Lorelei OT  11/13/2020

## 2020-11-13 NOTE — PROGRESS NOTES
Pharmacokinetic Assessment Follow Up: IV Vancomycin    Vancomycin serum concentration assessment(s):    The trough level was drawn correctly and can be used to guide therapy at this time. The measurement is below the desired definitive target range of 15 to 20 mcg/mL.    Vancomycin Regimen Plan:    Change regimen to Vancomycin 750 mg IV every 18 hours with next serum trough concentration measured at 0100 prior to 3rd dose on 11/15/20    Drug levels (last 3 results):  Recent Labs   Lab Result Units 11/11/20  1228 11/13/20  1228   Vancomycin-Trough ug/mL 14.8 14.5       Pharmacy will continue to follow and monitor vancomycin.    Please contact pharmacy at extension [phone] for questions regarding this assessment.    Thank you for the consult,   Davian Guerrero       Patient brief summary:  Martina Betancourt is a 70 y.o. female initiated on antimicrobial therapy with IV Vancomycin for treatment of bone/joint infection    The patient's current regimen is Vancomycin 750 mg IV every 24 hours     Drug Allergies:   Review of patient's allergies indicates:   Allergen Reactions    Hydroxyzine hcl     Tizanidine        Actual Body Weight:   126.7kg    Renal Function:   Estimated Creatinine Clearance: 73.7 mL/min (based on SCr of 1 mg/dL).,     Dialysis Method (if applicable):      CBC (last 72 hours):  Recent Labs   Lab Result Units 11/12/20  0713   WBC K/uL 6.77   Hemoglobin g/dL 10.3*   Hematocrit % 34.7*   Platelets K/uL 373*   Gran % % 57.2   Lymph % % 26.1   Mono % % 12.0   Eosinophil % % 3.8   Basophil % % 0.6   Differential Method  Automated       Metabolic Panel (last 72 hours):  Recent Labs   Lab Result Units 11/11/20  0535 11/12/20  0713 11/13/20  0549   Sodium mmol/L 140 139 139   Potassium mmol/L 3.3* 4.0 3.6   Chloride mmol/L 99 100 100   CO2 mmol/L 29 29 29   Glucose mg/dL 94 94 95   BUN mg/dL 14 14 12   Creatinine mg/dL 1.0 1.0 1.0   Magnesium mg/dL  --  2.1  --    Phosphorus mg/dL  --  3.4  --         Vancomycin Administrations:  vancomycin given in the last 96 hours                   vancomycin 750 mg in dextrose 5 % 250 mL IVPB (ready to mix system) (mg) 750 mg New Bag 11/13/20 1403     750 mg New Bag 11/12/20 1347     750 mg New Bag 11/11/20 1303     750 mg New Bag 11/10/20 1417                Microbiologic Results:  Microbiology Results (last 7 days)     ** No results found for the last 168 hours. **

## 2020-11-13 NOTE — PLAN OF CARE
Problem: Bariatric Environmental Safety  Goal: Safety Maintained with Care  Outcome: Ongoing, Progressing     Problem: Fluid Imbalance (Pneumonia)  Goal: Fluid Balance  Outcome: Ongoing, Progressing     Problem: Wound  Goal: Optimal Wound Healing  Outcome: Ongoing, Progressing     Problem: Infection  Goal: Infection Symptom Resolution  Outcome: Ongoing, Progressing     Problem: Fall Injury Risk  Goal: Absence of Fall and Fall-Related Injury  Outcome: Ongoing, Progressing

## 2020-11-14 LAB
ANION GAP SERPL CALC-SCNC: 12 MMOL/L (ref 8–16)
BUN SERPL-MCNC: 12 MG/DL (ref 8–23)
CALCIUM SERPL-MCNC: 10.5 MG/DL (ref 8.7–10.5)
CHLORIDE SERPL-SCNC: 100 MMOL/L (ref 95–110)
CO2 SERPL-SCNC: 28 MMOL/L (ref 23–29)
CREAT SERPL-MCNC: 1 MG/DL (ref 0.5–1.4)
EST. GFR  (AFRICAN AMERICAN): >60 ML/MIN/1.73 M^2
EST. GFR  (NON AFRICAN AMERICAN): 56 ML/MIN/1.73 M^2
GLUCOSE SERPL-MCNC: 98 MG/DL (ref 70–110)
POTASSIUM SERPL-SCNC: 4 MMOL/L (ref 3.5–5.1)
SODIUM SERPL-SCNC: 140 MMOL/L (ref 136–145)

## 2020-11-14 PROCEDURE — 94640 AIRWAY INHALATION TREATMENT: CPT

## 2020-11-14 PROCEDURE — 25000003 PHARM REV CODE 250: Performed by: STUDENT IN AN ORGANIZED HEALTH CARE EDUCATION/TRAINING PROGRAM

## 2020-11-14 PROCEDURE — 94761 N-INVAS EAR/PLS OXIMETRY MLT: CPT

## 2020-11-14 PROCEDURE — 80048 BASIC METABOLIC PNL TOTAL CA: CPT

## 2020-11-14 PROCEDURE — 63600175 PHARM REV CODE 636 W HCPCS: Performed by: FAMILY MEDICINE

## 2020-11-14 PROCEDURE — A4216 STERILE WATER/SALINE, 10 ML: HCPCS | Performed by: INTERNAL MEDICINE

## 2020-11-14 PROCEDURE — 27000221 HC OXYGEN, UP TO 24 HOURS

## 2020-11-14 PROCEDURE — 94799 UNLISTED PULMONARY SVC/PX: CPT

## 2020-11-14 PROCEDURE — 25000003 PHARM REV CODE 250: Performed by: INTERNAL MEDICINE

## 2020-11-14 PROCEDURE — 25000003 PHARM REV CODE 250: Performed by: NURSE PRACTITIONER

## 2020-11-14 PROCEDURE — 99900035 HC TECH TIME PER 15 MIN (STAT)

## 2020-11-14 PROCEDURE — 36415 COLL VENOUS BLD VENIPUNCTURE: CPT

## 2020-11-14 PROCEDURE — 11000004 HC SNF PRIVATE

## 2020-11-14 PROCEDURE — 25000242 PHARM REV CODE 250 ALT 637 W/ HCPCS: Performed by: NURSE PRACTITIONER

## 2020-11-14 PROCEDURE — 97530 THERAPEUTIC ACTIVITIES: CPT

## 2020-11-14 RX ADMIN — Medication 10 ML: at 12:11

## 2020-11-14 RX ADMIN — MICONAZOLE NITRATE: 20 OINTMENT TOPICAL at 08:11

## 2020-11-14 RX ADMIN — PRAVASTATIN SODIUM 40 MG: 40 TABLET ORAL at 08:11

## 2020-11-14 RX ADMIN — METHOCARBAMOL TABLETS 500 MG: 500 TABLET, COATED ORAL at 04:11

## 2020-11-14 RX ADMIN — MEMANTINE 10 MG: 10 TABLET ORAL at 08:11

## 2020-11-14 RX ADMIN — ISOSORBIDE MONONITRATE 60 MG: 60 TABLET, EXTENDED RELEASE ORAL at 07:11

## 2020-11-14 RX ADMIN — Medication 10 ML: at 04:11

## 2020-11-14 RX ADMIN — LISINOPRIL 10 MG: 10 TABLET ORAL at 08:11

## 2020-11-14 RX ADMIN — GUAIFENESIN 600 MG: 600 TABLET, EXTENDED RELEASE ORAL at 08:11

## 2020-11-14 RX ADMIN — HYDROCODONE BITARTRATE AND ACETAMINOPHEN 1 TABLET: 10; 325 TABLET ORAL at 01:11

## 2020-11-14 RX ADMIN — DONEPEZIL HYDROCHLORIDE 10 MG: 5 TABLET, FILM COATED ORAL at 08:11

## 2020-11-14 RX ADMIN — PANTOPRAZOLE SODIUM 40 MG: 40 TABLET, DELAYED RELEASE ORAL at 05:11

## 2020-11-14 RX ADMIN — METOPROLOL SUCCINATE 50 MG: 50 TABLET, EXTENDED RELEASE ORAL at 08:11

## 2020-11-14 RX ADMIN — FUROSEMIDE 40 MG: 40 TABLET ORAL at 08:11

## 2020-11-14 RX ADMIN — VANCOMYCIN HYDROCHLORIDE 750 MG: 750 INJECTION, POWDER, LYOPHILIZED, FOR SOLUTION INTRAVENOUS at 07:11

## 2020-11-14 RX ADMIN — IPRATROPIUM BROMIDE AND ALBUTEROL SULFATE 3 ML: .5; 3 SOLUTION RESPIRATORY (INHALATION) at 07:11

## 2020-11-14 RX ADMIN — ASPIRIN 81 MG: 81 TABLET, COATED ORAL at 07:11

## 2020-11-14 RX ADMIN — METHOCARBAMOL TABLETS 500 MG: 500 TABLET, COATED ORAL at 08:11

## 2020-11-14 RX ADMIN — FERROUS SULFATE TAB 325 MG (65 MG ELEMENTAL FE) 325 MG: 325 (65 FE) TAB at 08:11

## 2020-11-14 RX ADMIN — Medication 10 ML: at 01:11

## 2020-11-14 RX ADMIN — DULOXETINE 60 MG: 30 CAPSULE, DELAYED RELEASE ORAL at 07:11

## 2020-11-14 RX ADMIN — METHOCARBAMOL TABLETS 500 MG: 500 TABLET, COATED ORAL at 01:11

## 2020-11-14 RX ADMIN — METHOCARBAMOL TABLETS 500 MG: 500 TABLET, COATED ORAL at 07:11

## 2020-11-14 RX ADMIN — Medication 10 ML: at 05:11

## 2020-11-14 RX ADMIN — IPRATROPIUM BROMIDE AND ALBUTEROL SULFATE 3 ML: .5; 3 SOLUTION RESPIRATORY (INHALATION) at 08:11

## 2020-11-14 RX ADMIN — HYDROCODONE BITARTRATE AND ACETAMINOPHEN 1 TABLET: 10; 325 TABLET ORAL at 07:11

## 2020-11-14 RX ADMIN — CLOPIDOGREL 75 MG: 75 TABLET, FILM COATED ORAL at 07:11

## 2020-11-14 RX ADMIN — IPRATROPIUM BROMIDE AND ALBUTEROL SULFATE 3 ML: .5; 3 SOLUTION RESPIRATORY (INHALATION) at 01:11

## 2020-11-14 RX ADMIN — ALUMINUM HYDROXIDE, MAGNESIUM HYDROXIDE, AND DIMETHICONE 30 ML: 400; 400; 40 SUSPENSION ORAL at 09:11

## 2020-11-14 RX ADMIN — HYDROCODONE BITARTRATE AND ACETAMINOPHEN 1 TABLET: 10; 325 TABLET ORAL at 08:11

## 2020-11-14 NOTE — PT/OT/SLP PROGRESS
Physical Therapy Treatment    Patient Name:  Martina Betancourt   MRN:  [id number]    Recommendations:     Discharge Recommendations:  nursing facility, basic   Discharge Equipment Recommendations: lift device   Barriers to discharge: Decreased caregiver support    Assessment:     Martina Betancourt is a 70 y.o. female admitted with a medical diagnosis of Debility.  She presents with the following impairments/functional limitations:  weakness, impaired functional mobilty, impaired cardiopulmonary response to activity, impaired endurance, pain, impaired joint extensibility, impaired balance, impaired muscle length, impaired self care skills, decreased ROM, decreased lower extremity function .  Patient was found supine in bed with bowel and bladder incontinence.  Max assist with rolling side <> side with increase time using side rail due to pain, mod/max assist with supine <> sit increase time due to pain, max assist x 1 with sit <> stand using a SW.  Tolerated standing x 2 with forward flexed posture, 10-30 seconds at a time. Dependent with perineal and sandee-anal hygiene.     Rehab Prognosis: Fair; patient would benefit from acute skilled PT services to address these deficits and reach maximum level of function.    Recent Surgery: * No surgery found *      Plan:     During this hospitalization, patient to be seen daily to address the identified rehab impairments via therapeutic activities, therapeutic exercises, wheelchair management/training, neuromuscular re-education and progress toward the following goals:    · Plan of Care Expires:  11/20/20    Subjective     Chief Complaint: Back pain   Patient/Family Comments/goals: Unstated   Pain/Comfort:  · Pain Rating 1: 9/10  · Location - Side 1: Bilateral  · Location - Orientation 1: lower  · Location 1: back  · Pain Addressed 1: Reposition, Distraction, Nurse notified  · Pain Rating Post-Intervention 1: (did not quantify, patient was screaming with every  movement)      Objective:     Communicated with nurse, patient and nursing assistant  prior to session.  Patient found supine with PureWick, oxygen, peripheral IV upon PT entry to room.     General Precautions: Standard, fall   Orthopedic Precautions:N/A   Braces: N/A     Functional Mobility:  · Bed Mobility:     · Rolling Left:  maximal assistance  · Rolling Right: maximal assistance  · Scooting: maximal assistance  · Bridging: maximal assistance  · Supine to Sit: moderate assistance and maximal assistance  · Sit to Supine: moderate assistance and maximal assistance  · Transfers:     · Sit to Stand:  maximal assistance with standard walker  · Balance: SBA with static sitting balance, mod assist with static standing using a SW, forward flexed posture      AM-PAC 6 CLICK MOBILITY  Turning over in bed (including adjusting bedclothes, sheets and blankets)?: 2  Sitting down on and standing up from a chair with arms (e.g., wheelchair, bedside commode, etc.): 1  Moving from lying on back to sitting on the side of the bed?: 2  Moving to and from a bed to a chair (including a wheelchair)?: 1  Need to walk in hospital room?: 1  Climbing 3-5 steps with a railing?: 1  Basic Mobility Total Score: 8       Therapeutic Activities and Exercises:   Bed mobility, sitting at edge of bed, sit <. stand with SW,  and standing with SW    Patient left HOB elevated with all lines intact, call button in reach and nurse notified..    GOALS:   Multidisciplinary Problems     Physical Therapy Goals        Problem: Physical Therapy Goal    Goal Priority Disciplines Outcome Goal Variances Interventions   Physical Therapy Goal     PT, PT/OT Ongoing, Progressing     Description: Goals to be met by: 11/10/20    Patient will increase functional independence with mobility by performin. Rolling to sides using bed rail with contact guard assistance and cues  2. Supine to sit with minimal assistance and cues  3. Sit to supine with moderate  assistance and cues  4. Tolerate Static Sit at side of the bed for 10 minutes with Standby Assistance  5. Bed to chair transfer with moderate assistance and cues using Slide Board TECHNIQUE  6. Lower extremity exercise program x15 reps per handout, with assistance as needed                      Time Tracking:     PT Received On: 11/14/20  PT Start Time: 1057     PT Stop Time: 1130  PT Total Time (min): 33 min     Billable Minutes: Therapeutic Activity 33    Treatment Type: Treatment  PT/PTA: PT     PTA Visit Number: 0     Socorro casper, PT  11/14/2020

## 2020-11-14 NOTE — CARE UPDATE
Message sent to ID LAYO Rm via secure chat,  to see if she will need in person follow up or virtual for appt scheduled 11/18. Awaiting response.

## 2020-11-14 NOTE — PLAN OF CARE
Patient admitted as SWING, Assessment complete per flow sheet, AAOX4, RR even unlabored BBS diminished, on 3 L NC. Abdomen soft, non distended, with active bowel sounds x 4 quads.Tolerating diet well. PICC to RT arm SL, purple port does not flush, red port sluggish, Dsg C/D/I. Medications administered per MAR, tolerated well. HERMELINDA heels elevated off bed with pillows, daily betadine applied to pressure ulcer by Erin BARON. safety precautions maintained, bed alarm on, free from falls/ injury, call bell in reach, instructed to call for needs, voiced understanding, agrees with plan of care  Worked with PT, see notes

## 2020-11-14 NOTE — PLAN OF CARE
Problem: Physical Therapy Goal  Goal: Physical Therapy Goal  Description: Goals to be met by: 20    Patient will increase functional independence with mobility by performin. Rolling to sides using bed rail with contact guard assistance and cues  2. Supine to sit with minimal assistance and cues  3. Sit to supine with moderate assistance and cues  4. Tolerate Static Sit at side of the bed for 10 minutes with Standby Assistance  5. Bed to chair transfer with moderate assistance and cues using Slide Board TECHNIQUE  6. Lower extremity exercise program x15 reps per handout, with assistance as needed     Outcome: Ongoing, Progressing

## 2020-11-15 LAB
ANION GAP SERPL CALC-SCNC: 12 MMOL/L (ref 8–16)
BUN SERPL-MCNC: 12 MG/DL (ref 8–23)
CALCIUM SERPL-MCNC: 10.1 MG/DL (ref 8.7–10.5)
CHLORIDE SERPL-SCNC: 100 MMOL/L (ref 95–110)
CO2 SERPL-SCNC: 26 MMOL/L (ref 23–29)
CREAT SERPL-MCNC: 1 MG/DL (ref 0.5–1.4)
EST. GFR  (AFRICAN AMERICAN): >60 ML/MIN/1.73 M^2
EST. GFR  (NON AFRICAN AMERICAN): 56 ML/MIN/1.73 M^2
GLUCOSE SERPL-MCNC: 93 MG/DL (ref 70–110)
POTASSIUM SERPL-SCNC: 3.7 MMOL/L (ref 3.5–5.1)
SODIUM SERPL-SCNC: 138 MMOL/L (ref 136–145)
VANCOMYCIN TROUGH SERPL-MCNC: 15.9 UG/ML (ref 10–22)

## 2020-11-15 PROCEDURE — 94640 AIRWAY INHALATION TREATMENT: CPT

## 2020-11-15 PROCEDURE — 11000004 HC SNF PRIVATE

## 2020-11-15 PROCEDURE — 63600175 PHARM REV CODE 636 W HCPCS: Performed by: FAMILY MEDICINE

## 2020-11-15 PROCEDURE — 99900035 HC TECH TIME PER 15 MIN (STAT)

## 2020-11-15 PROCEDURE — 80048 BASIC METABOLIC PNL TOTAL CA: CPT

## 2020-11-15 PROCEDURE — A4216 STERILE WATER/SALINE, 10 ML: HCPCS | Performed by: INTERNAL MEDICINE

## 2020-11-15 PROCEDURE — 25000003 PHARM REV CODE 250: Performed by: NURSE PRACTITIONER

## 2020-11-15 PROCEDURE — 25000003 PHARM REV CODE 250: Performed by: STUDENT IN AN ORGANIZED HEALTH CARE EDUCATION/TRAINING PROGRAM

## 2020-11-15 PROCEDURE — 27000221 HC OXYGEN, UP TO 24 HOURS

## 2020-11-15 PROCEDURE — 25000003 PHARM REV CODE 250: Performed by: INTERNAL MEDICINE

## 2020-11-15 PROCEDURE — 25000242 PHARM REV CODE 250 ALT 637 W/ HCPCS: Performed by: NURSE PRACTITIONER

## 2020-11-15 PROCEDURE — 97530 THERAPEUTIC ACTIVITIES: CPT

## 2020-11-15 PROCEDURE — 36415 COLL VENOUS BLD VENIPUNCTURE: CPT

## 2020-11-15 PROCEDURE — 25000003 PHARM REV CODE 250: Performed by: FAMILY MEDICINE

## 2020-11-15 PROCEDURE — 94761 N-INVAS EAR/PLS OXIMETRY MLT: CPT

## 2020-11-15 PROCEDURE — 80202 ASSAY OF VANCOMYCIN: CPT

## 2020-11-15 PROCEDURE — 94799 UNLISTED PULMONARY SVC/PX: CPT

## 2020-11-15 RX ADMIN — DONEPEZIL HYDROCHLORIDE 10 MG: 5 TABLET, FILM COATED ORAL at 08:11

## 2020-11-15 RX ADMIN — ISOSORBIDE MONONITRATE 60 MG: 60 TABLET, EXTENDED RELEASE ORAL at 08:11

## 2020-11-15 RX ADMIN — ACETAMINOPHEN 500 MG: 500 TABLET, FILM COATED ORAL at 08:11

## 2020-11-15 RX ADMIN — IPRATROPIUM BROMIDE AND ALBUTEROL SULFATE 3 ML: .5; 3 SOLUTION RESPIRATORY (INHALATION) at 07:11

## 2020-11-15 RX ADMIN — Medication 10 ML: at 05:11

## 2020-11-15 RX ADMIN — DULOXETINE 60 MG: 30 CAPSULE, DELAYED RELEASE ORAL at 08:11

## 2020-11-15 RX ADMIN — METHOCARBAMOL TABLETS 500 MG: 500 TABLET, COATED ORAL at 08:11

## 2020-11-15 RX ADMIN — Medication 10 ML: at 07:11

## 2020-11-15 RX ADMIN — VANCOMYCIN HYDROCHLORIDE 750 MG: 750 INJECTION, POWDER, LYOPHILIZED, FOR SOLUTION INTRAVENOUS at 02:11

## 2020-11-15 RX ADMIN — FERROUS SULFATE TAB 325 MG (65 MG ELEMENTAL FE) 325 MG: 325 (65 FE) TAB at 08:11

## 2020-11-15 RX ADMIN — LISINOPRIL 10 MG: 10 TABLET ORAL at 08:11

## 2020-11-15 RX ADMIN — HYDROCODONE BITARTRATE AND ACETAMINOPHEN 1 TABLET: 10; 325 TABLET ORAL at 08:11

## 2020-11-15 RX ADMIN — HYDROCODONE BITARTRATE AND ACETAMINOPHEN 1 TABLET: 10; 325 TABLET ORAL at 02:11

## 2020-11-15 RX ADMIN — PRAVASTATIN SODIUM 40 MG: 40 TABLET ORAL at 08:11

## 2020-11-15 RX ADMIN — MICONAZOLE NITRATE: 20 OINTMENT TOPICAL at 09:11

## 2020-11-15 RX ADMIN — Medication 10 ML: at 02:11

## 2020-11-15 RX ADMIN — METHOCARBAMOL TABLETS 500 MG: 500 TABLET, COATED ORAL at 05:11

## 2020-11-15 RX ADMIN — METOPROLOL SUCCINATE 50 MG: 50 TABLET, EXTENDED RELEASE ORAL at 08:11

## 2020-11-15 RX ADMIN — ALUMINUM HYDROXIDE, MAGNESIUM HYDROXIDE, AND DIMETHICONE 30 ML: 400; 400; 40 SUSPENSION ORAL at 08:11

## 2020-11-15 RX ADMIN — MEMANTINE 10 MG: 10 TABLET ORAL at 08:11

## 2020-11-15 RX ADMIN — IPRATROPIUM BROMIDE AND ALBUTEROL SULFATE 3 ML: .5; 3 SOLUTION RESPIRATORY (INHALATION) at 01:11

## 2020-11-15 RX ADMIN — ASPIRIN 81 MG: 81 TABLET, COATED ORAL at 08:11

## 2020-11-15 RX ADMIN — Medication 10 ML: at 11:11

## 2020-11-15 RX ADMIN — PANTOPRAZOLE SODIUM 40 MG: 40 TABLET, DELAYED RELEASE ORAL at 05:11

## 2020-11-15 RX ADMIN — Medication 10 ML: at 12:11

## 2020-11-15 RX ADMIN — VANCOMYCIN HYDROCHLORIDE 750 MG: 750 INJECTION, POWDER, LYOPHILIZED, FOR SOLUTION INTRAVENOUS at 07:11

## 2020-11-15 RX ADMIN — METHOCARBAMOL TABLETS 500 MG: 500 TABLET, COATED ORAL at 02:11

## 2020-11-15 RX ADMIN — GUAIFENESIN 600 MG: 600 TABLET, EXTENDED RELEASE ORAL at 08:11

## 2020-11-15 RX ADMIN — FUROSEMIDE 40 MG: 40 TABLET ORAL at 08:11

## 2020-11-15 RX ADMIN — MICONAZOLE NITRATE: 20 OINTMENT TOPICAL at 08:11

## 2020-11-15 RX ADMIN — CLOPIDOGREL 75 MG: 75 TABLET, FILM COATED ORAL at 08:11

## 2020-11-15 NOTE — PROGRESS NOTES
Pharmacokinetic Assessment Follow Up: IV Vancomycin    Vancomycin serum concentration assessment(s):    The trough level was drawn correctly and can be used to guide therapy at this time. The measurement is within the desired definitive target range of 15 to 20 mcg/mL.    Vancomycin Regimen Plan:    Continue regimen to Vancomycin 750 mg IV every 18 hours with next serum trough concentration measured at 11/16/20 prior to 3 dose on 1300    Drug levels (last 3 results):  Recent Labs   Lab Result Units 11/13/20  1228 11/15/20  0135   Vancomycin-Trough ug/mL 14.5 15.9       Pharmacy will continue to follow and monitor vancomycin.    Please contact pharmacy at extension [phone] for questions regarding this assessment.    Thank you for the consult,   Jailene Pace       Patient brief summary:  Martina Betancourt is a 70 y.o. female initiated on antimicrobial therapy with IV Vancomycin for treatment of bone/joint infection    The patient's current regimen is 750mg q18    Drug Allergies:   Review of patient's allergies indicates:   Allergen Reactions    Hydroxyzine hcl     Tizanidine        Actual Body Weight:   126kg    Renal Function:   Estimated Creatinine Clearance: 73.7 mL/min (based on SCr of 1 mg/dL).,     Dialysis Method (if applicable):  N/A    CBC (last 72 hours):  Recent Labs   Lab Result Units 11/12/20  0713   WBC K/uL 6.77   Hemoglobin g/dL 10.3*   Hematocrit % 34.7*   Platelets K/uL 373*   Gran % % 57.2   Lymph % % 26.1   Mono % % 12.0   Eosinophil % % 3.8   Basophil % % 0.6   Differential Method  Automated       Metabolic Panel (last 72 hours):  Recent Labs   Lab Result Units 11/12/20  0713 11/13/20  0549 11/14/20  0635   Sodium mmol/L 139 139 140   Potassium mmol/L 4.0 3.6 4.0   Chloride mmol/L 100 100 100   CO2 mmol/L 29 29 28   Glucose mg/dL 94 95 98   BUN mg/dL 14 12 12   Creatinine mg/dL 1.0 1.0 1.0   Magnesium mg/dL 2.1  --   --    Phosphorus mg/dL 3.4  --   --        Vancomycin Administrations:  vancomycin  given in the last 96 hours                     vancomycin 750 mg in dextrose 5 % 250 mL IVPB (ready to mix system) (mg) 750 mg New Bag 11/15/20 0216     750 mg New Bag 11/14/20 0752    vancomycin 750 mg in dextrose 5 % 250 mL IVPB (ready to mix system) (mg) 750 mg New Bag 11/13/20 1403     750 mg New Bag 11/12/20 1347     750 mg New Bag 11/11/20 1303                    Microbiologic Results:  Microbiology Results (last 7 days)       ** No results found for the last 168 hours. **

## 2020-11-15 NOTE — PLAN OF CARE
Problem: Physical Therapy Goal  Goal: Physical Therapy Goal  Description: Goals to be met by: 20    Patient will increase functional independence with mobility by performin. Rolling to sides using bed rail with contact guard assistance and cues  2. Supine to sit with minimal assistance and cues  3. Sit to supine with moderate assistance and cues  4. Tolerate Static Sit at side of the bed for 10 minutes with Standby Assistance  5. Bed to chair transfer with moderate assistance and cues using stand step TECHNIQUE  6. Lower extremity exercise program x15 reps per handout, with assistance as needed     Outcome: Ongoing, Progressing

## 2020-11-15 NOTE — PT/OT/SLP PROGRESS
"Physical Therapy Treatment    Patient Name:  Martina Betancourt   MRN:  [id number]    Recommendations:     Discharge Recommendations:  nursing facility, skilled, nursing facility, basic   Discharge Equipment Recommendations: none   Barriers to discharge: Decreased caregiver support    Assessment:     Martina Betancourt is a 70 y.o. female admitted with a medical diagnosis of Debility.  She presents with the following impairments/functional limitations:  weakness, impaired functional mobilty, impaired cardiopulmonary response to activity, impaired endurance, pain, impaired joint extensibility, impaired balance, impaired muscle length, impaired self care skills, decreased lower extremity function, decreased ROM .  Patient reports that she does not walk but she was able to get from the Hoveround to her bed by herself.  Patient tolerated treatment today with tolerable pain.  She stood up 3 times with a SW, more upright posture compared to yesterday.     Rehab Prognosis: Fair; patient would benefit from acute skilled PT services to address these deficits and reach maximum level of function.    Recent Surgery: * No surgery found *      Plan:     During this hospitalization, patient to be seen daily to address the identified rehab impairments via gait training, therapeutic activities, therapeutic exercises, neuromuscular re-education and progress toward the following goals:    · Plan of Care Expires:  11/20/20    Subjective     Chief Complaint: "My back."   Patient/Family Comments/goals: "They gave me my right medications. "   Pain/Comfort:  · Pain Rating 1: 6/10  · Location - Side 1: Bilateral  · Location - Orientation 1: lower  · Location 1: back  · Pain Addressed 1: Pre-medicate for activity, Nurse notified, Reposition, Distraction  · Pain Rating Post-Intervention 1: (did not quantify)      Objective:     Communicated with nurse and patient  prior to session.  Patient found supine with peripheral IV, PureWick, oxygen upon PT " entry to room.     General Precautions: Standard, fall   Orthopedic Precautions:N/A   Braces: N/A     Functional Mobility:  · Bed Mobility:     · Scooting: minimum assistance  · Bridging: minimum assistance  · Supine to Sit: minimum assistance  · Sit to Supine: moderate assistance  · Transfers:     · Sit to Stand:  minimum assistance and moderate assistance with standard walker  · Balance: Modified independent with static sitting at edge of bed, min assist with static standing with SW       AM-PAC 6 CLICK MOBILITY  Turning over in bed (including adjusting bedclothes, sheets and blankets)?: 3  Sitting down on and standing up from a chair with arms (e.g., wheelchair, bedside commode, etc.): 1  Moving from lying on back to sitting on the side of the bed?: 3  Moving to and from a bed to a chair (including a wheelchair)?: 1  Need to walk in hospital room?: 1  Climbing 3-5 steps with a railing?: 1  Basic Mobility Total Score: 10       Therapeutic Activities and Exercises:   Supine <> sit, sitting at edge of bed, scooting, bridging, standing  X 20-30 seconds at a time x 3 working on upright posture     Patient left supine with all lines intact, call button in reach and nurse notified..    GOALS:   Multidisciplinary Problems     Physical Therapy Goals        Problem: Physical Therapy Goal    Goal Priority Disciplines Outcome Goal Variances Interventions   Physical Therapy Goal     PT, PT/OT Ongoing, Progressing     Description: Goals to be met by: 11/10/20    Patient will increase functional independence with mobility by performin. Rolling to sides using bed rail with contact guard assistance and cues  2. Supine to sit with minimal assistance and cues  3. Sit to supine with moderate assistance and cues  4. Tolerate Static Sit at side of the bed for 10 minutes with Standby Assistance  5. Bed to chair transfer with moderate assistance and cues using stand step TECHNIQUE  6. Lower extremity exercise program x15 reps per  handout, with assistance as needed                      Time Tracking:     PT Received On: 11/15/20  PT Start Time: 1108     PT Stop Time: 1126  PT Total Time (min): 18 min     Billable Minutes: Therapeutic Activity 18    Treatment Type: Treatment  PT/PTA: PT     PTA Visit Number: 0     Socorro casper, PT  11/15/2020

## 2020-11-16 LAB
ANION GAP SERPL CALC-SCNC: 8 MMOL/L (ref 8–16)
BASOPHILS # BLD AUTO: 0.03 K/UL (ref 0–0.2)
BASOPHILS NFR BLD: 0.3 % (ref 0–1.9)
BUN SERPL-MCNC: 15 MG/DL (ref 8–23)
CALCIUM SERPL-MCNC: 10.5 MG/DL (ref 8.7–10.5)
CHLORIDE SERPL-SCNC: 98 MMOL/L (ref 95–110)
CO2 SERPL-SCNC: 31 MMOL/L (ref 23–29)
CREAT SERPL-MCNC: 1.1 MG/DL (ref 0.5–1.4)
DIFFERENTIAL METHOD: ABNORMAL
EOSINOPHIL # BLD AUTO: 0.4 K/UL (ref 0–0.5)
EOSINOPHIL NFR BLD: 4 % (ref 0–8)
ERYTHROCYTE [DISTWIDTH] IN BLOOD BY AUTOMATED COUNT: 16.5 % (ref 11.5–14.5)
EST. GFR  (AFRICAN AMERICAN): 58 ML/MIN/1.73 M^2
EST. GFR  (NON AFRICAN AMERICAN): 50 ML/MIN/1.73 M^2
FUNGUS SPEC CULT: NORMAL
GLUCOSE SERPL-MCNC: 96 MG/DL (ref 70–110)
HCT VFR BLD AUTO: 37.1 % (ref 37–48.5)
HGB BLD-MCNC: 10.9 G/DL (ref 12–16)
IMM GRANULOCYTES # BLD AUTO: 0.02 K/UL (ref 0–0.04)
IMM GRANULOCYTES NFR BLD AUTO: 0.2 % (ref 0–0.5)
LYMPHOCYTES # BLD AUTO: 1.2 K/UL (ref 1–4.8)
LYMPHOCYTES NFR BLD: 13 % (ref 18–48)
MAGNESIUM SERPL-MCNC: 2.5 MG/DL (ref 1.6–2.6)
MCH RBC QN AUTO: 27.2 PG (ref 27–31)
MCHC RBC AUTO-ENTMCNC: 29.4 G/DL (ref 32–36)
MCV RBC AUTO: 93 FL (ref 82–98)
MONOCYTES # BLD AUTO: 0.8 K/UL (ref 0.3–1)
MONOCYTES NFR BLD: 8.5 % (ref 4–15)
NEUTROPHILS # BLD AUTO: 6.5 K/UL (ref 1.8–7.7)
NEUTROPHILS NFR BLD: 74 % (ref 38–73)
NRBC BLD-RTO: 0 /100 WBC
PHOSPHATE SERPL-MCNC: 3.2 MG/DL (ref 2.7–4.5)
PLATELET # BLD AUTO: 344 K/UL (ref 150–350)
PMV BLD AUTO: 9.5 FL (ref 9.2–12.9)
POTASSIUM SERPL-SCNC: 4 MMOL/L (ref 3.5–5.1)
PROCALCITONIN SERPL IA-MCNC: 0.04 NG/ML
RBC # BLD AUTO: 4.01 M/UL (ref 4–5.4)
SODIUM SERPL-SCNC: 137 MMOL/L (ref 136–145)
VANCOMYCIN TROUGH SERPL-MCNC: 16.8 UG/ML (ref 10–22)
WBC # BLD AUTO: 8.83 K/UL (ref 3.9–12.7)

## 2020-11-16 PROCEDURE — 94799 UNLISTED PULMONARY SVC/PX: CPT

## 2020-11-16 PROCEDURE — 25000003 PHARM REV CODE 250: Performed by: INTERNAL MEDICINE

## 2020-11-16 PROCEDURE — 25000003 PHARM REV CODE 250: Performed by: NURSE PRACTITIONER

## 2020-11-16 PROCEDURE — 27000221 HC OXYGEN, UP TO 24 HOURS

## 2020-11-16 PROCEDURE — 99309 PR NURSING FAC CARE, SUBSEQ, SIGNIF COMPLIC: ICD-10-PCS | Mod: ,,, | Performed by: FAMILY MEDICINE

## 2020-11-16 PROCEDURE — 99309 SBSQ NF CARE MODERATE MDM 30: CPT | Mod: ,,, | Performed by: FAMILY MEDICINE

## 2020-11-16 PROCEDURE — 84100 ASSAY OF PHOSPHORUS: CPT

## 2020-11-16 PROCEDURE — 25000242 PHARM REV CODE 250 ALT 637 W/ HCPCS: Performed by: NURSE PRACTITIONER

## 2020-11-16 PROCEDURE — 36415 COLL VENOUS BLD VENIPUNCTURE: CPT

## 2020-11-16 PROCEDURE — 97530 THERAPEUTIC ACTIVITIES: CPT

## 2020-11-16 PROCEDURE — 63600175 PHARM REV CODE 636 W HCPCS: Performed by: FAMILY MEDICINE

## 2020-11-16 PROCEDURE — 11000004 HC SNF PRIVATE

## 2020-11-16 PROCEDURE — 80202 ASSAY OF VANCOMYCIN: CPT

## 2020-11-16 PROCEDURE — 80048 BASIC METABOLIC PNL TOTAL CA: CPT

## 2020-11-16 PROCEDURE — 25000003 PHARM REV CODE 250: Performed by: FAMILY MEDICINE

## 2020-11-16 PROCEDURE — 94640 AIRWAY INHALATION TREATMENT: CPT

## 2020-11-16 PROCEDURE — 94761 N-INVAS EAR/PLS OXIMETRY MLT: CPT

## 2020-11-16 PROCEDURE — A4216 STERILE WATER/SALINE, 10 ML: HCPCS | Performed by: INTERNAL MEDICINE

## 2020-11-16 PROCEDURE — 83735 ASSAY OF MAGNESIUM: CPT

## 2020-11-16 PROCEDURE — 85025 COMPLETE CBC W/AUTO DIFF WBC: CPT

## 2020-11-16 PROCEDURE — 84145 PROCALCITONIN (PCT): CPT

## 2020-11-16 RX ADMIN — IPRATROPIUM BROMIDE AND ALBUTEROL SULFATE 3 ML: .5; 3 SOLUTION RESPIRATORY (INHALATION) at 07:11

## 2020-11-16 RX ADMIN — MICONAZOLE NITRATE: 20 OINTMENT TOPICAL at 08:11

## 2020-11-16 RX ADMIN — DULOXETINE 60 MG: 30 CAPSULE, DELAYED RELEASE ORAL at 08:11

## 2020-11-16 RX ADMIN — ACETAMINOPHEN 500 MG: 500 TABLET, FILM COATED ORAL at 02:11

## 2020-11-16 RX ADMIN — ISOSORBIDE MONONITRATE 60 MG: 60 TABLET, EXTENDED RELEASE ORAL at 08:11

## 2020-11-16 RX ADMIN — METHOCARBAMOL TABLETS 500 MG: 500 TABLET, COATED ORAL at 08:11

## 2020-11-16 RX ADMIN — MEMANTINE 10 MG: 10 TABLET ORAL at 08:11

## 2020-11-16 RX ADMIN — PRAVASTATIN SODIUM 40 MG: 40 TABLET ORAL at 08:11

## 2020-11-16 RX ADMIN — ACETAMINOPHEN 500 MG: 500 TABLET, FILM COATED ORAL at 08:11

## 2020-11-16 RX ADMIN — METOPROLOL SUCCINATE 50 MG: 50 TABLET, EXTENDED RELEASE ORAL at 08:11

## 2020-11-16 RX ADMIN — HYDROCODONE BITARTRATE AND ACETAMINOPHEN 1 TABLET: 10; 325 TABLET ORAL at 12:11

## 2020-11-16 RX ADMIN — HYDROCODONE BITARTRATE AND ACETAMINOPHEN 1 TABLET: 10; 325 TABLET ORAL at 08:11

## 2020-11-16 RX ADMIN — METHOCARBAMOL TABLETS 500 MG: 500 TABLET, COATED ORAL at 12:11

## 2020-11-16 RX ADMIN — DONEPEZIL HYDROCHLORIDE 10 MG: 5 TABLET, FILM COATED ORAL at 08:11

## 2020-11-16 RX ADMIN — IPRATROPIUM BROMIDE AND ALBUTEROL SULFATE 3 ML: .5; 3 SOLUTION RESPIRATORY (INHALATION) at 02:11

## 2020-11-16 RX ADMIN — VANCOMYCIN HYDROCHLORIDE 750 MG: 750 INJECTION, POWDER, LYOPHILIZED, FOR SOLUTION INTRAVENOUS at 02:11

## 2020-11-16 RX ADMIN — FERROUS SULFATE TAB 325 MG (65 MG ELEMENTAL FE) 325 MG: 325 (65 FE) TAB at 08:11

## 2020-11-16 RX ADMIN — METHOCARBAMOL TABLETS 500 MG: 500 TABLET, COATED ORAL at 05:11

## 2020-11-16 RX ADMIN — GUAIFENESIN 600 MG: 600 TABLET, EXTENDED RELEASE ORAL at 08:11

## 2020-11-16 RX ADMIN — Medication 10 ML: at 05:11

## 2020-11-16 RX ADMIN — Medication 10 ML: at 11:11

## 2020-11-16 RX ADMIN — PANTOPRAZOLE SODIUM 40 MG: 40 TABLET, DELAYED RELEASE ORAL at 05:11

## 2020-11-16 RX ADMIN — LISINOPRIL 10 MG: 10 TABLET ORAL at 08:11

## 2020-11-16 RX ADMIN — Medication 10 ML: at 12:11

## 2020-11-16 RX ADMIN — ASPIRIN 81 MG: 81 TABLET, COATED ORAL at 08:11

## 2020-11-16 RX ADMIN — HYDROCODONE BITARTRATE AND ACETAMINOPHEN 1 TABLET: 10; 325 TABLET ORAL at 03:11

## 2020-11-16 RX ADMIN — FUROSEMIDE 40 MG: 40 TABLET ORAL at 08:11

## 2020-11-16 RX ADMIN — CLOPIDOGREL 75 MG: 75 TABLET, FILM COATED ORAL at 08:11

## 2020-11-16 NOTE — PT/OT/SLP PROGRESS
"Physical Therapy Treatment    Patient Name:  Martina Betancourt   MRN:  [id number]    Recommendations:     Discharge Recommendations:  nursing facility, basic   Discharge Equipment Recommendations: none   Barriers to discharge: Decreased caregiver support    Assessment:     Martina Betancourt is a 70 y.o. female admitted with a medical diagnosis of Debility.  She presents with the following impairments/functional limitations:  weakness, impaired endurance, impaired self care skills, impaired functional mobilty, decreased safety awareness, impaired balance, pain, gait instability. Patient tolerated well PT session. Improved performance on bed mobility with lesser physical assistance needed and no sign of aggravating pain at lower back.    Rehab Prognosis: Fair; patient would benefit from acute skilled PT services to address these deficits and reach maximum level of function.    Recent Surgery: * No surgery found *      Plan:     During this hospitalization, patient to be seen daily to address the identified rehab impairments via therapeutic activities, therapeutic exercises, gait training and progress toward the following goals:    · Plan of Care Expires:  11/20/20    Subjective     Chief Complaint: lower back pain and Right knee pain  Patient/Family Comments/goals: "To do my best in Therapy".  Pain/Comfort:  · Pain Rating 1: 8/10  · Location - Side 1: Right  · Location - Orientation 1: lower  · Location 1: back  · Pain Addressed 1: Cessation of Activity, Reposition  · Pain Rating Post-Intervention 1: 7/10  · Pain Rating 2: 8/10  · Location - Side 2: Right  · Location - Orientation 2: lower  · Location 2: knee  · Pain Addressed 2: Cessation of Activity  · Pain Rating Post-Intervention 2: 6/10      Objective:     Communicated with patient prior to session.  Patient found supine with peripheral IV, PureWick, oxygen upon PT entry to room.     General Precautions: Standard, fall   Orthopedic Precautions:N/A   Braces: N/A "     Functional Mobility:  · Bed Mobility:     · Rolling Left:  supervision  · Rolling Right: supervision  · Supine to Sit: supervision  · Sit to Supine: moderate assistance and cues in ascending LE  · Transfers:     · Sit to Stand:  moderate assistance and cues with standard walker  · Balance: Standing with std walker Static: Fair- and tolerated for 1 minute and 20 seconds(1st attempt) ; 35 seconds( 2nd attempt).      AM-PAC 6 CLICK MOBILITY  Turning over in bed (including adjusting bedclothes, sheets and blankets)?: 3  Sitting down on and standing up from a chair with arms (e.g., wheelchair, bedside commode, etc.): 2  Moving from lying on back to sitting on the side of the bed?: 3  Moving to and from a bed to a chair (including a wheelchair)?: 1  Need to walk in hospital room?: 1  Climbing 3-5 steps with a railing?: 1  Basic Mobility Total Score: 11       Therapeutic Activities and Exercises:   Body Mechanics on bed mobility, sitting and standing balance trng and tolerance at bed side using Std Walker.    Patient left supine with all lines intact, call button in reach, bed alarm on and nursing notified..    GOALS:   Multidisciplinary Problems     Physical Therapy Goals        Problem: Physical Therapy Goal    Goal Priority Disciplines Outcome Goal Variances Interventions   Physical Therapy Goal     PT, PT/OT Ongoing, Progressing     Description: Goals to be met by: 11/10/20    Patient will increase functional independence with mobility by performin. Rolling to sides using bed rail with contact guard assistance and cues  2. Supine to sit with minimal assistance and cues  3. Sit to supine with moderate assistance and cues  4. Tolerate Static Sit at side of the bed for 10 minutes with Standby Assistance  5. Bed to chair transfer with moderate assistance and cues using stand step TECHNIQUE  6. Lower extremity exercise program x15 reps per handout, with assistance as needed                      Time Tracking:      PT Received On: 11/16/20  PT Start Time: 1352     PT Stop Time: 1415  PT Total Time (min): 23 min     Billable Minutes: Therapeutic Activity 20    Treatment Type: Treatment  PT/PTA: PT     PTA Visit Number: 0     Edgar Lamas, PT  11/16/2020

## 2020-11-16 NOTE — PT/OT/SLP PROGRESS
"Occupational Therapy   Treatment    Name: Martina Betancourt  MRN: [id number]  Admitting Diagnosis:  Debility       Recommendations:     Discharge Recommendations: nursing facility, basic  Discharge Equipment Recommendations:  none  Barriers to discharge:       Assessment:     Martina Betancourt is a 70 y.o. female with a medical diagnosis of Debility.  She presents with reports of depression this date, noting she has lost several family members and Nonden[employer] members this year and the holidays are hard for her. Nursing informed and aware. Patient willing to sit EOB but declined ADLs or standing reporting "that's all the energy I have for today." Upon sitting EOB OT noticed tweezers stuck to patient's back with imprint on back, patient reporting she lost the tweezers, nursing informed and assessed. Patient continues to benefit from skilled OT services.   Performance deficits affecting function are weakness, impaired endurance, impaired cognition, impaired self care skills, decreased upper extremity function, impaired functional mobilty, decreased lower extremity function, gait instability, decreased safety awareness, impaired balance, impaired cardiopulmonary response to activity.     Rehab Prognosis:  Fair; patient would benefit from acute skilled OT services to address these deficits and reach maximum level of function.       Plan:     Patient to be seen 5 x/week to address the above listed problems via self-care/home management, therapeutic activities, therapeutic exercises  · Plan of Care Expires: 11/19/20  · Plan of Care Reviewed with: patient    Subjective     Pain/Comfort:  · Pain Rating 1: 8/10  · Location - Side 1: Bilateral  · Location - Orientation 1: lower  · Location 1: back  · Pain Addressed 1: Cessation of Activity  · Pain Rating Post-Intervention 1: 6/10    Objective:     Communicated with: Nursing prior to session.  Patient found supine with peripheral IV, PureWick, oxygen upon OT entry to room.    General " Precautions: Standard, fall   Orthopedic Precautions:N/A   Braces: N/A     Occupational Performance:     Bed Mobility:    · Patient completed Rolling/Turning to Left with  modified independence  · Patient completed Scooting/Bridging with modified independence  · Patient completed Supine to Sit with supervision  · Patient completed Sit to Supine with minimum assistance     Functional Mobility/Transfers:  · Patient declined    Activities of Daily Living:  · Patient declined      [facility] 6 Click ADL: 15    Treatment & Education:  Therapist facilitated bed mobility and sitting EOB, encouraging patient to participate in sit to stand or ADLs or strengthening, patient declining, reporting sadness and depression this date due to holidays approaching and loss of several family/[employer] members.     Patient left supine with all lines intact, call button in reach and nursing notifiedEducation:      GOALS:   Multidisciplinary Problems     Occupational Therapy Goals        Problem: Occupational Therapy Goal    Goal Priority Disciplines Outcome Interventions   Occupational Therapy Goal     OT, PT/OT Ongoing, Progressing    Description: Goals to be met by: 11/18/2020     Patient will increase functional independence with ADLs by performing:    Feeding with Supervision. (GOAL MET)  UE Dressing with Supervision (GOAL MET).  LE Dressing with Moderate Assistance (GOAL MET).  Grooming while seated with Supervision (GOAL MET).  Toileting from bedside commode with Minimal Assistance for hygiene and clothing management.   Sitting at edge of bed x5 minutes with Supervision. (GOAL MET)  Rolling to Bilateral with Supervision. (GOAL MET)  Supine to sit with Minimal Assistance. (GOAL MET)  Squat pivot transfers with Minimal Assistance.  Toilet transfer to bedside commode with Minimal Assistance.  Upper extremity exercise program x10 reps per handout, with assistance as needed. (GOAL MET)                     Time Tracking:     OT Date of Treatment:  11/16/20  OT Start Time: 1033  OT Stop Time: 1048  OT Total Time (min): 15 min    Billable Minutes:Therapeutic Activity 15 minutes    Golden Bartlett OT  11/16/2020

## 2020-11-16 NOTE — PROGRESS NOTES
[facility] Medicine  Progress Note    Patient Name: Martina Betancourt  MRN: [id number]  Patient Class: IP- Swing   Admission Date: 10/20/2020  Length of Stay: 27 days  Attending Physician: Vamsi Manuel MD  Primary Care Provider: Joe Fuller Iii, MD        Subjective:     Principal Problem:Debility        HPI:  69yo female patient with hx of alzheiners, CAD, HLD, HTN, myopathy, MI, obesity, sleep apnea and OA (knees non ambulatory uses gonzalez round). She was living at home with her daughter. Recently admitted to [facility] with MRSA bacteremia and subsequent pneumonia treated with Zyvox x 7 days with clearance of her bacteremia now admitted with back pain found to have T9-10 osteo discitis, T8-10 epidural phlegmon with associated paraverterbral abscesses. Spine infection likely hematogenous from under treated bacteremia. Neurosurgery has been consulted. She has been on Vancomycin and Ceftriaxone. Underwent T9-10 disc space biopsy with IR on 10/16. Cultures negative, though had been on IV antibiotics multiple days prior. Afebrile. HDS. Repeat blood cx sterile. ID rec cont vanc 1750mg q 24 till 12/3.     Overview/Hospital Course:  10/23 Here for skilled ; needing IV abx for spinal abscess;  vanc 1750mg q 24 till 12/3  Afebrile , 3LNC - O2 sat 95%   + debility, very deconditioned; bilt LE x 10 reps followed by bed mobility trng and static sit balance and tolerance at side of the bed  C/o back pain with activity; Occupational therapist notes that she is very resistant to movement and c/o severe pain with minimal activity.   Will order toradol 15mg x 1dose IV; pt did much better after toradol rx today per OT. Will order daily prior to OT as tolerated  Monitor renal fx     10/26 here for skilled therapy and cont IV abc. Vanc 1750mg iv every 12hr. Noted elevated WBC this am and she report that she has had diarrhea for the last 4 days.   · PT reports Supine to Sit: maximal assistance, of 2  persons and patient able to reach with UE to push through rail and with improved push off with UE and initiation  · Sit to Supine: maximal assistance of 2 people with improved ability of patient to control descent of upper body  · Transfers:     · Sit to Stand:  moderate assistance, maximal assistance of 2 persons with no AD.  PT and PT tech blocking both knees while assisting pt to elevate hips from bed  Balance: pt able to stand x15 seconds EOB limited largely by knee and back pain.       10/28  She is still on vanc IV daily. No longer with diarrhea. Did have heme positive stools H/H stable on lovenox for DVT prophylaxis and plavix. Will monitor h/h M/Thurs. No fever. No elevated WBC  pt is really immobile.  · Bed Mobility:     · Rolling Left:  moderate assistance  · Rolling Right: moderate assistance  · Scooting: maximal assistance  · Supine to Sit: maximum/moderate assistace and cues  · Sit to Supine: maximum assistace x 1-2 and cues  · Transfers:     · Sit to Stand:  maximum assistance and cues inside the parallel bars with facilitation tech  · Bed to Chair: to wheelchair  with  maximum assistance and cues  using  Slide Board  · Balance: Standing Static inside the parallel bars with Poor grade. Tolerated for ~10 seconds with 2 attempts with  Maximum assistance and cues.  Cont PT daily  10/30  IV  vanc 1,000mg q 24hr x 8 weeks total till 12/3 per ID for discitis; random vanc 12.5 yesterday    No longer with diarrhea. Did have heme positive stools H/H stable on lovenox for DVT prophylaxis and plavix. H&H stable, lovenox stopped. No fever. No elevated WBC. She has productive cough this am. K+ 3.2 yesterday, getting lasix, will repeat KCL 40meq today   pt is really immobile.Standing with RW Static: Poor for ~15 seconds tolerance with max Assistance and cues  Has PICC line- one port clotted;       11/2 she remains on vanc 1000mg  q 24hr x 8 weeks total till 12/3 per ID for discitis. Vanc trough 15.8 10/31/20.  Vss/afebrile. intermittent back pain with prn pain meds helping. She is not doing much with PT. She uses gonzalez round at home but can transfer independently. Here she is max assist     11/4 Remains on vanc 1000mg  q 24hr x 8 weeks total till 12/3 per ID for discitis. Vanc trough 16.6 on 11/2   Afebrile , having regular BMs; getting Norco 10mg q 6   · Continues to require max assist; Sit to Stand:  Max/moderate assistance and cues with standard walker  Balance: Static Stand with Std Walker: Fair- with 2 mins and 7 seconds  tolerance and max/moderate assistance and cues  11/4 Remains on vanc 1000mg  q 24hr x 8 weeks total till 12/3 per ID for discitis. Vanc trough 16.6 on 11/2   Afebrile, WBC nml, creat stable , K+3.4  Requires maximal assist but finally standing with PT with walker .    11/9/20    Remains on vanc 1000mg  q 24hr x 8 weeks total till 12/3 per ID for discitis. VSS/afebrile. No elevated WBC. Last vanc trough 16.3 11/7  She is progressing with PT now min assist from sit to supine and mod assist sit to stand with RW,    11/11 remains on vanc 750mg IV every 24hr, dose monitored by pharm. Last vanc trough 16.5 11/9. Repeat MRI done Monday. Contact with neurosurgery sent to discuss comparison with original johny since she is mid way on IV abx and had no intervention. She has no comoplaints. VSS/labs reviewed. She is standing at bedside 1min and 15 sec with mod assist    11/13 remains on vanc 750mg IV every 24hr, dose monitored by pharm. Last vanc trough 14.8  11/11. Repeat MRI done Monday; mid tx as per ID recommendations;  messaged ID, shows severe Degenerative disease of spine   Able to stand now for over 1m with PT     11/16 remains on vanc every 24hr 750mg. Last vanc trough was 15.9 yesterday. Afebrile, no elevated WBC. She reports that she is feeling well. Getting stronger. Working with PT. Min assist with bed mobility and mod with standing.       Review of Systems   Constitutional: Positive for activity  change. Negative for fever.   Cardiovascular: Negative for chest pain.   Gastrointestinal: Negative for abdominal pain.   Musculoskeletal: Positive for back pain.   Neurological: Positive for weakness.   Psychiatric/Behavioral: Positive for confusion.     Objective:     Vital Signs (Most Recent):  Temp: 98.1 °F (36.7 °C) (11/16/20 0714)  Pulse: 64 (11/16/20 0731)  Resp: 20 (11/16/20 0851)  BP: 133/60 (11/16/20 0714)  SpO2: (!) 94 % (11/16/20 0731) Vital Signs (24h Range):  Temp:  [97 °F (36.1 °C)-98.1 °F (36.7 °C)] 98.1 °F (36.7 °C)  Pulse:  [63-68] 64  Resp:  [18-20] 20  SpO2:  [92 %-96 %] 94 %  BP: (105-133)/(52-60) 133/60     Weight: 128.7 kg (283 lb 11.7 oz)  Body mass index is 40.71 kg/m².    Intake/Output Summary (Last 24 hours) at 11/16/2020 1030  Last data filed at 11/15/2020 1800  Gross per 24 hour   Intake 547 ml   Output 1950 ml   Net -1403 ml      Physical Exam  Vitals signs and nursing note reviewed.   Constitutional:       General: She is not in acute distress.     Appearance: She is well-developed. She is obese.   HENT:      Head: Normocephalic and atraumatic.      Nose: Nose normal.      Mouth/Throat:      Mouth: Mucous membranes are moist.      Pharynx: Oropharynx is clear.   Eyes:      Extraocular Movements: Extraocular movements intact.      Conjunctiva/sclera: Conjunctivae normal.      Pupils: Pupils are equal, round, and reactive to light.   Neck:      Musculoskeletal: Neck supple.      Thyroid: No thyromegaly.   Cardiovascular:      Rate and Rhythm: Normal rate and regular rhythm.      Pulses: Normal pulses.      Heart sounds: No murmur. No friction rub. No gallop.    Pulmonary:      Effort: Pulmonary effort is normal. No respiratory distress.      Breath sounds: No wheezing, rhonchi or rales.   Abdominal:      General: Bowel sounds are normal.      Palpations: Abdomen is soft.   Musculoskeletal:      Right lower leg: No edema.      Left lower leg: No edema.   Lymphadenopathy:      Cervical: No  cervical adenopathy.   Skin:     General: Skin is warm and dry.   Neurological:      General: No focal deficit present.      Mental Status: She is alert.      Cranial Nerves: No cranial nerve deficit.      Motor: Weakness present.      Gait: Gait abnormal.      Comments: Obese, not very cooperative with PT.  Max assistance   Psychiatric:         Mood and Affect: Mood normal.         Behavior: Behavior normal.         Significant Labs: procal 0.04  BMP:   Recent Labs   Lab 11/16/20  0538   GLU 96      K 4.0   CL 98   CO2 31*   BUN 15   CREATININE 1.1   CALCIUM 10.5   MG 2.5   phos 3.7  Mag 1.9  Lab Results   Component Value Date    WBC 8.83 11/16/2020    HGB 10.9 (L) 11/16/2020    HCT 37.1 11/16/2020    MCV 93 11/16/2020     11/16/2020 11/9 vanc trough 16.5    MRI thoracic spine   1. Altered signal intensity changes at T9/T10 with evidence of erosive focus about the opposing endplates and evidence of enhancing phlegmonous formation identified in the central component of the disc with central low signal intensity compatible with osteomyelitis/discitis.  Vertical dimensions of the area of interest cause extreme erosion along the anterior portion of the vertebral bodies of interest without evidence of any significant paraspinal component.  2. No significant soft tissue component.  3. Chronic degenerative spondylosis changes of the remaining segments of the thoracic spine.  4. Chronic degenerative changes of the entirety of the cervical spine.      Assessment/Plan:      * Debility  Was walking with walker prior to pneumonia/bacteremia admit last month then after d.c was only w/c bound (gonzalez round) will add pt here and try and get her as strong as poss as she lives at home with family.   10/23 Chronic co back ache but also has recent high ankle fx ; per LDana Vinson NP Spoke with Ariela VASQUES who will see patient while on SWING. She looked over x rays and hx and reports that patient can weight  bear as tolerated. Nurse/OT/MD notified    · 10/26 Supine to Sit: maximal assistance, of 2 persons and patient able to reach with UE to push through rail and with improved push off with UE and initiation  · Sit to Supine: maximal assistance of 2 people with improved ability of patient to control descent of upper body  · Transfers:     · Sit to Stand:  moderate assistance, maximal assistance of 2 persons with no AD.  PT and PT tech blocking both knees while assisting pt to elevate hips from bed  Balance: pt able to stand x15 seconds EOB limited largely by knee and back pain.     10/28.  · Bed Mobility:     · Rolling Left:  moderate assistance  · Rolling Right: moderate assistance  · Scooting: maximal assistance  · Supine to Sit: maximum/moderate assistace and cues  · Sit to Supine: maximum assistace x 1-2 and cues  · Transfers:     · Sit to Stand:  maximum assistance and cues inside the parallel bars with facilitation tech  · Bed to Chair: to wheelchair  with  maximum assistance and cues  using  Slide Board  · Balance: Standing Static inside the parallel bars with Poor grade. Tolerated for ~10 seconds with 2 attempts with  Maximum assistance and cues.  10/30 Standing with RW Static: Poor for ~15 seconds tolerance with max Assistance and cues  11/2  wheelchair maximum assistance x 2 and cues  with  slide board  using  slide/scoot tech  11/4 Sit to Stand:  Max/moderate assistance and cues with standard walker  Balance: Static Stand with Std Walker: Fair- with 2 mins and 7 seconds  tolerance and max/moderate assistance and cues  · 11/6 max/moderate assistance and cues  with standard walker  Balance: Standing Static with std walker: Poor+ for 1 minute and 15 seconds(1st attempt); 45 seconds(2nd attempt)    11/9 she is becoming stronger  · Cont with PT  · Rolling Left:  stand by assistance  · Rolling Right: stand by assistance  · Scooting: stand by assistance  · Supine to Sit: contact guard assistance  · Sit to Supine:  "minimum assistance  · Transfers:     Sit to Stand:  moderate assistance with rolling walker.3    11/13/20 Transfers:     · Sit to Stand:  Moderate assistance and cues with standard walker  Balance: Standing with Std Walker Static: Poor+ for  1 minute and  20 seconds(1st attempt); 35 seconds(2nd attempt). woth Moderate Assistance and cues    · 11/16 Scooting: minimum assistance  · Bridging: minimum assistance  · Supine to Sit: minimum assistance  · Sit to Supine: moderate assistance  · Transfers:     · Sit to Stand:  minimum assistance and moderate assistance with standard walker  Balance: Modified independent with static sitting at edge of bed, min assist with static standing with SW     Cough productive of purulent sputum  Productive cough this am per nurse- " pale green, grey"   Add mucinex and give neb tx q 6 while awake  No fever, WBC normal   Increase activity ..  Laying flat today. No complaints SOB./cough.  On 2L NC pox 92-94%      Diarrhea  Stools d/c as diarrhea has improved. + heme occult . lovenox stopped cbc stable .    Malnutrition of mild degree  Dietary.  Boost.      Hydronephrosis  Seen on CT 10/7 - u/a was negative .      CAD (coronary artery disease)  Cont asa, plavix, lasix, isosorbide, lisinopril, toprol and statin.      HTN (hypertension)  Increase lisinopril 2.5>10mg daily  10/23 SBP 160s at times; lisinopril just increased will give more time for lisinopril to work before titration  10/26 SBP 140s; cont to monitor  10/28 /80- increase lisinopril  10/30 BP much better 119/59- 138/62  .  11/2 /58.  VSS   11/9 /56- well controlled (cont lisinopril, lasix, isosorbide, metoprolol).    Discitis  vanc 1450mg IV every 24hr x 8 weeks total till 12/3 per ID ; will stay here for the duration   PT  vanc now 1000mg IV every 24hr till 12/3 per ID last vanc trough 10/31 15.8    Reach out to ns today for guidance on re-imaging  Noted per ID Plan for 6 - 8 weeks of IV antibiotics with repeat " MRI at midpoint of treatment as no surgical drainage undertaken.  - ID will follow.  She is currently midpoint; will plan for MRI of thoracic on Monday .  Cont vanc now 750mg IV daily with trough 11/7 16.3 .  11/13 remains max assist; can stand for 1m 20sec; awaiting ID assessment of MRI    11/11 contacted neurosurgery for repeat MRI review. She is only able to stand at bedside with therapy for 1min and 15 sec with mod to max assist. She would be a difficult transfer at this point.   11/16 will repeat message to neurosurgery as well as ID to eval patient maybe virtual visit. She would be a very difficult transfer for in person visit    Severe obesity (BMI >= 40)  Using boost as she has low appetite and low protein       Obstructive sleep apnea treated with continuous positive airway pressure (CPAP)  continue home cpap      Dementia without behavioral disturbance  Cont namenda and aricept .      VTE Risk Mitigation (From admission, onward)    None          Discharge Planning   BRIT: 12/3/2020     Code Status: DNR   Is the patient medically ready for discharge?:     Reason for patient still in hospital (select all that apply): Patient trending condition and Treatment  Discharge Plan A: Home with family, Home Health                  Vamsi Manuel MD  Department of Hospital Medicine   [facility]

## 2020-11-16 NOTE — ASSESSMENT & PLAN NOTE
Was walking with walker prior to pneumonia/bacteremia admit last month then after d.c was only w/c bound (gonzalez round) will add pt here and try and get her as strong as poss as she lives at home with family.   10/23 Chronic co back ache but also has recent high ankle fx ; per EDDIE Vinson NP Spoke with Ariela VASQUES who will see patient while on SWING. She looked over x rays and hx and reports that patient can weight bear as tolerated. Nurse/OT/MD notified    · 10/26 Supine to Sit: maximal assistance, of 2 persons and patient able to reach with UE to push through rail and with improved push off with UE and initiation  · Sit to Supine: maximal assistance of 2 people with improved ability of patient to control descent of upper body  · Transfers:     · Sit to Stand:  moderate assistance, maximal assistance of 2 persons with no AD.  PT and PT tech blocking both knees while assisting pt to elevate hips from bed  Balance: pt able to stand x15 seconds EOB limited largely by knee and back pain.     10/28.  · Bed Mobility:     · Rolling Left:  moderate assistance  · Rolling Right: moderate assistance  · Scooting: maximal assistance  · Supine to Sit: maximum/moderate assistace and cues  · Sit to Supine: maximum assistace x 1-2 and cues  · Transfers:     · Sit to Stand:  maximum assistance and cues inside the parallel bars with facilitation tech  · Bed to Chair: to wheelchair  with  maximum assistance and cues  using  Slide Board  · Balance: Standing Static inside the parallel bars with Poor grade. Tolerated for ~10 seconds with 2 attempts with  Maximum assistance and cues.  10/30 Standing with RW Static: Poor for ~15 seconds tolerance with max Assistance and cues  11/2  wheelchair maximum assistance x 2 and cues  with  slide board  using  slide/scoot tech  11/4 Sit to Stand:  Max/moderate assistance and cues with standard walker  Balance: Static Stand with Std Walker: Fair- with 2 mins and 7 seconds  tolerance and  max/moderate assistance and cues  · 11/6 max/moderate assistance and cues  with standard walker  Balance: Standing Static with std walker: Poor+ for 1 minute and 15 seconds(1st attempt); 45 seconds(2nd attempt)    11/9 she is becoming stronger  · Cont with PT  · Rolling Left:  stand by assistance  · Rolling Right: stand by assistance  · Scooting: stand by assistance  · Supine to Sit: contact guard assistance  · Sit to Supine: minimum assistance  · Transfers:     Sit to Stand:  moderate assistance with rolling walker.3    11/13/20 Transfers:     · Sit to Stand:  Moderate assistance and cues with standard walker  Balance: Standing with Std Walker Static: Poor+ for  1 minute and  20 seconds(1st attempt); 35 seconds(2nd attempt). woth Moderate Assistance and cues    · 11/16 Scooting: minimum assistance  · Bridging: minimum assistance  · Supine to Sit: minimum assistance  · Sit to Supine: moderate assistance  · Transfers:     · Sit to Stand:  minimum assistance and moderate assistance with standard walker  Balance: Modified independent with static sitting at edge of bed, min assist with static standing with SW

## 2020-11-16 NOTE — SUBJECTIVE & OBJECTIVE
Review of Systems   Constitutional: Positive for activity change. Negative for fever.   Cardiovascular: Negative for chest pain.   Gastrointestinal: Negative for abdominal pain.   Musculoskeletal: Positive for back pain.   Neurological: Positive for weakness.   Psychiatric/Behavioral: Positive for confusion.     Objective:     Vital Signs (Most Recent):  Temp: 98.1 °F (36.7 °C) (11/16/20 0714)  Pulse: 64 (11/16/20 0731)  Resp: 20 (11/16/20 0851)  BP: 133/60 (11/16/20 0714)  SpO2: (!) 94 % (11/16/20 0731) Vital Signs (24h Range):  Temp:  [97 °F (36.1 °C)-98.1 °F (36.7 °C)] 98.1 °F (36.7 °C)  Pulse:  [63-68] 64  Resp:  [18-20] 20  SpO2:  [92 %-96 %] 94 %  BP: (105-133)/(52-60) 133/60     Weight: 128.7 kg (283 lb 11.7 oz)  Body mass index is 40.71 kg/m².    Intake/Output Summary (Last 24 hours) at 11/16/2020 1030  Last data filed at 11/15/2020 1800  Gross per 24 hour   Intake 547 ml   Output 1950 ml   Net -1403 ml      Physical Exam  Vitals signs and nursing note reviewed.   Constitutional:       General: She is not in acute distress.     Appearance: She is well-developed. She is obese.   HENT:      Head: Normocephalic and atraumatic.      Nose: Nose normal.      Mouth/Throat:      Mouth: Mucous membranes are moist.      Pharynx: Oropharynx is clear.   Eyes:      Extraocular Movements: Extraocular movements intact.      Conjunctiva/sclera: Conjunctivae normal.      Pupils: Pupils are equal, round, and reactive to light.   Neck:      Musculoskeletal: Neck supple.      Thyroid: No thyromegaly.   Cardiovascular:      Rate and Rhythm: Normal rate and regular rhythm.      Pulses: Normal pulses.      Heart sounds: No murmur. No friction rub. No gallop.    Pulmonary:      Effort: Pulmonary effort is normal. No respiratory distress.      Breath sounds: No wheezing, rhonchi or rales.   Abdominal:      General: Bowel sounds are normal.      Palpations: Abdomen is soft.   Musculoskeletal:      Right lower leg: No edema.       Left lower leg: No edema.   Lymphadenopathy:      Cervical: No cervical adenopathy.   Skin:     General: Skin is warm and dry.   Neurological:      General: No focal deficit present.      Mental Status: She is alert.      Cranial Nerves: No cranial nerve deficit.      Motor: Weakness present.      Gait: Gait abnormal.      Comments: Obese, not very cooperative with PT.  Max assistance   Psychiatric:         Mood and Affect: Mood normal.         Behavior: Behavior normal.         Significant Labs: procal 0.04  BMP:   Recent Labs   Lab 11/16/20  0538   GLU 96      K 4.0   CL 98   CO2 31*   BUN 15   CREATININE 1.1   CALCIUM 10.5   MG 2.5   phos 3.7  Mag 1.9  Lab Results   Component Value Date    WBC 8.83 11/16/2020    HGB 10.9 (L) 11/16/2020    HCT 37.1 11/16/2020    MCV 93 11/16/2020     11/16/2020 11/9 vanc trough 16.5    MRI thoracic spine   1. Altered signal intensity changes at T9/T10 with evidence of erosive focus about the opposing endplates and evidence of enhancing phlegmonous formation identified in the central component of the disc with central low signal intensity compatible with osteomyelitis/discitis.  Vertical dimensions of the area of interest cause extreme erosion along the anterior portion of the vertebral bodies of interest without evidence of any significant paraspinal component.  2. No significant soft tissue component.  3. Chronic degenerative spondylosis changes of the remaining segments of the thoracic spine.  4. Chronic degenerative changes of the entirety of the cervical spine.

## 2020-11-16 NOTE — ASSESSMENT & PLAN NOTE
"Productive cough this am per nurse- " pale green, grey"   Add mucinex and give neb tx q 6 while awake  No fever, WBC normal   Increase activity ..  Laying flat today. No complaints SOB./cough.  On 2L NC pox 92-94%    "

## 2020-11-16 NOTE — ASSESSMENT & PLAN NOTE
vanc 1450mg IV every 24hr x 8 weeks total till 12/3 per ID ; will stay here for the duration   PT  vanc now 1000mg IV every 24hr till 12/3 per ID last vanc trough 10/31 15.8    Reach out to ns today for guidance on re-imaging  Noted per ID Plan for 6 - 8 weeks of IV antibiotics with repeat MRI at midpoint of treatment as no surgical drainage undertaken.  - ID will follow.  She is currently midpoint; will plan for MRI of thoracic on Monday .  Cont vanc now 750mg IV daily with trough 11/7 16.3 .  11/13 remains max assist; can stand for 1m 20sec; awaiting ID assessment of MRI    11/11 contacted neurosurgery for repeat MRI review. She is only able to stand at bedside with therapy for 1min and 15 sec with mod to max assist. She would be a difficult transfer at this point.   11/16 will repeat message to neurosurgery as well as ID to eval patient maybe virtual visit. She would be a very difficult transfer for in person visit

## 2020-11-16 NOTE — PROGRESS NOTES
Pharmacokinetic Assessment Follow Up: IV Vancomycin    Vancomycin serum concentration assessment(s):    The trough level was drawn correctly and can be used to guide therapy at this time. The measurement is within the desired definitive target range of 15 to 20 mcg/mL.    Vancomycin Regimen Plan:    Continue regimen to Vancomycin 750 mg IV every 18 hours with next serum trough concentration measured at 0100 prior to 3rd dose on 11/18/20    Drug levels (last 3 results):  Recent Labs   Lab Result Units 11/15/20  0135 11/16/20  1258   Vancomycin-Trough ug/mL 15.9 16.8       Pharmacy will continue to follow and monitor vancomycin.    Please contact pharmacy at extension [phone] for questions regarding this assessment.    Thank you for the consult,   Davian Guerrero       Patient brief summary:  Martina Betancourt is a 70 y.o. female initiated on antimicrobial therapy with IV Vancomycin for treatment of bone/joint infection    The patient's current regimen is vancomycin 750mg iv q 18 h    Drug Allergies:   Review of patient's allergies indicates:   Allergen Reactions    Hydroxyzine hcl     Tizanidine        Actual Body Weight:   128.7kg    Renal Function:   Estimated Creatinine Clearance: 67.6 mL/min (based on SCr of 1.1 mg/dL).,     Dialysis Method (if applicable):      CBC (last 72 hours):  Recent Labs   Lab Result Units 11/16/20  0538   WBC K/uL 8.83   Hemoglobin g/dL 10.9*   Hematocrit % 37.1   Platelets K/uL 344   Gran % % 74.0*   Lymph % % 13.0*   Mono % % 8.5   Eosinophil % % 4.0   Basophil % % 0.3   Differential Method  Automated       Metabolic Panel (last 72 hours):  Recent Labs   Lab Result Units 11/14/20  0635 11/15/20  0629 11/16/20  0538   Sodium mmol/L 140 138 137   Potassium mmol/L 4.0 3.7 4.0   Chloride mmol/L 100 100 98   CO2 mmol/L 28 26 31*   Glucose mg/dL 98 93 96   BUN mg/dL 12 12 15   Creatinine mg/dL 1.0 1.0 1.1   Magnesium mg/dL  --   --  2.5   Phosphorus mg/dL  --   --  3.2       Vancomycin  Administrations:  vancomycin given in the last 96 hours                   vancomycin 750 mg in dextrose 5 % 250 mL IVPB (ready to mix system) (mg) 750 mg New Bag 11/16/20 1407     750 mg New Bag 11/15/20 1913     750 mg New Bag  0216     750 mg New Bag 11/14/20 0752    vancomycin 750 mg in dextrose 5 % 250 mL IVPB (ready to mix system) (mg) 750 mg New Bag 11/13/20 1403                Microbiologic Results:  Microbiology Results (last 7 days)     ** No results found for the last 168 hours. **

## 2020-11-17 LAB
ANION GAP SERPL CALC-SCNC: 11 MMOL/L (ref 8–16)
BUN SERPL-MCNC: 13 MG/DL (ref 8–23)
CALCIUM SERPL-MCNC: 10.3 MG/DL (ref 8.7–10.5)
CHLORIDE SERPL-SCNC: 98 MMOL/L (ref 95–110)
CO2 SERPL-SCNC: 29 MMOL/L (ref 23–29)
CREAT SERPL-MCNC: 1 MG/DL (ref 0.5–1.4)
EST. GFR  (AFRICAN AMERICAN): >60 ML/MIN/1.73 M^2
EST. GFR  (NON AFRICAN AMERICAN): 56 ML/MIN/1.73 M^2
GLUCOSE SERPL-MCNC: 95 MG/DL (ref 70–110)
POTASSIUM SERPL-SCNC: 4.3 MMOL/L (ref 3.5–5.1)
SODIUM SERPL-SCNC: 138 MMOL/L (ref 136–145)

## 2020-11-17 PROCEDURE — 63600175 PHARM REV CODE 636 W HCPCS: Performed by: FAMILY MEDICINE

## 2020-11-17 PROCEDURE — 11000004 HC SNF PRIVATE

## 2020-11-17 PROCEDURE — A4216 STERILE WATER/SALINE, 10 ML: HCPCS | Performed by: INTERNAL MEDICINE

## 2020-11-17 PROCEDURE — 27000221 HC OXYGEN, UP TO 24 HOURS

## 2020-11-17 PROCEDURE — 25000003 PHARM REV CODE 250: Performed by: FAMILY MEDICINE

## 2020-11-17 PROCEDURE — 94799 UNLISTED PULMONARY SVC/PX: CPT

## 2020-11-17 PROCEDURE — 94761 N-INVAS EAR/PLS OXIMETRY MLT: CPT

## 2020-11-17 PROCEDURE — 25000003 PHARM REV CODE 250: Performed by: INTERNAL MEDICINE

## 2020-11-17 PROCEDURE — 94640 AIRWAY INHALATION TREATMENT: CPT

## 2020-11-17 PROCEDURE — 80048 BASIC METABOLIC PNL TOTAL CA: CPT

## 2020-11-17 PROCEDURE — 97530 THERAPEUTIC ACTIVITIES: CPT

## 2020-11-17 PROCEDURE — 36415 COLL VENOUS BLD VENIPUNCTURE: CPT

## 2020-11-17 PROCEDURE — 25000003 PHARM REV CODE 250: Performed by: NURSE PRACTITIONER

## 2020-11-17 PROCEDURE — 99900035 HC TECH TIME PER 15 MIN (STAT)

## 2020-11-17 PROCEDURE — 25000242 PHARM REV CODE 250 ALT 637 W/ HCPCS: Performed by: NURSE PRACTITIONER

## 2020-11-17 RX ADMIN — METHOCARBAMOL TABLETS 500 MG: 500 TABLET, COATED ORAL at 09:11

## 2020-11-17 RX ADMIN — Medication 10 ML: at 09:11

## 2020-11-17 RX ADMIN — METOPROLOL SUCCINATE 50 MG: 50 TABLET, EXTENDED RELEASE ORAL at 09:11

## 2020-11-17 RX ADMIN — IPRATROPIUM BROMIDE AND ALBUTEROL SULFATE 3 ML: .5; 3 SOLUTION RESPIRATORY (INHALATION) at 07:11

## 2020-11-17 RX ADMIN — PANTOPRAZOLE SODIUM 40 MG: 40 TABLET, DELAYED RELEASE ORAL at 05:11

## 2020-11-17 RX ADMIN — GUAIFENESIN 600 MG: 600 TABLET, EXTENDED RELEASE ORAL at 09:11

## 2020-11-17 RX ADMIN — Medication 10 ML: at 05:11

## 2020-11-17 RX ADMIN — ACETAMINOPHEN 500 MG: 500 TABLET, FILM COATED ORAL at 09:11

## 2020-11-17 RX ADMIN — METHOCARBAMOL TABLETS 500 MG: 500 TABLET, COATED ORAL at 05:11

## 2020-11-17 RX ADMIN — DONEPEZIL HYDROCHLORIDE 10 MG: 5 TABLET, FILM COATED ORAL at 08:11

## 2020-11-17 RX ADMIN — ASPIRIN 81 MG: 81 TABLET, COATED ORAL at 09:11

## 2020-11-17 RX ADMIN — HYDROCODONE BITARTRATE AND ACETAMINOPHEN 1 TABLET: 10; 325 TABLET ORAL at 09:11

## 2020-11-17 RX ADMIN — Medication 10 ML: at 12:11

## 2020-11-17 RX ADMIN — IPRATROPIUM BROMIDE AND ALBUTEROL SULFATE 3 ML: .5; 3 SOLUTION RESPIRATORY (INHALATION) at 01:11

## 2020-11-17 RX ADMIN — MEMANTINE 10 MG: 10 TABLET ORAL at 09:11

## 2020-11-17 RX ADMIN — PRAVASTATIN SODIUM 40 MG: 40 TABLET ORAL at 09:11

## 2020-11-17 RX ADMIN — MICONAZOLE NITRATE: 20 OINTMENT TOPICAL at 09:11

## 2020-11-17 RX ADMIN — LISINOPRIL 10 MG: 10 TABLET ORAL at 09:11

## 2020-11-17 RX ADMIN — ACETAMINOPHEN 500 MG: 500 TABLET, FILM COATED ORAL at 03:11

## 2020-11-17 RX ADMIN — DULOXETINE 60 MG: 30 CAPSULE, DELAYED RELEASE ORAL at 09:11

## 2020-11-17 RX ADMIN — FERROUS SULFATE TAB 325 MG (65 MG ELEMENTAL FE) 325 MG: 325 (65 FE) TAB at 09:11

## 2020-11-17 RX ADMIN — ACETAMINOPHEN 500 MG: 500 TABLET, FILM COATED ORAL at 08:11

## 2020-11-17 RX ADMIN — FUROSEMIDE 40 MG: 40 TABLET ORAL at 09:11

## 2020-11-17 RX ADMIN — HYDROCODONE BITARTRATE AND ACETAMINOPHEN 1 TABLET: 10; 325 TABLET ORAL at 03:11

## 2020-11-17 RX ADMIN — ISOSORBIDE MONONITRATE 60 MG: 60 TABLET, EXTENDED RELEASE ORAL at 09:11

## 2020-11-17 RX ADMIN — CLOPIDOGREL 75 MG: 75 TABLET, FILM COATED ORAL at 09:11

## 2020-11-17 RX ADMIN — METHOCARBAMOL TABLETS 500 MG: 500 TABLET, COATED ORAL at 12:11

## 2020-11-17 RX ADMIN — VANCOMYCIN HYDROCHLORIDE 750 MG: 750 INJECTION, POWDER, LYOPHILIZED, FOR SOLUTION INTRAVENOUS at 09:11

## 2020-11-17 NOTE — PT/OT/SLP PROGRESS
"Occupational Therapy   Treatment    Name: Martina Betancourt  MRN: [id number]  Admitting Diagnosis:  Debility       Recommendations:     Discharge Recommendations: nursing facility, basic  Discharge Equipment Recommendations:  none  Barriers to discharge:       Assessment:     Martina Betancourt is a 70 y.o. female with a medical diagnosis of Debility.  She presents with good participation but continues to be limited by knees "buckling" when standing.     Performance deficits affecting function are weakness, gait instability, impaired cardiopulmonary response to activity, impaired balance, impaired endurance, decreased lower extremity function, decreased safety awareness, pain, impaired cognition, impaired self care skills, impaired functional mobilty.     Rehab Prognosis:  Fair; patient would benefit from acute skilled OT services to address these deficits and reach maximum level of function.       Plan:     Patient to be seen 5 x/week to address the above listed problems via self-care/home management, therapeutic activities, therapeutic exercises  · Plan of Care Expires: 11/19/20  · Plan of Care Reviewed with: patient    Subjective     Pain/Comfort:  · Pain Rating 1: 8/10  · Location - Orientation 1: lower  · Location 1: back  · Pain Addressed 1: Reposition  · Pain Rating Post-Intervention 1: 5/10    Objective:     Communicated with: Nursing prior to session.  Patient found supine with peripheral IV, PureWick, oxygen upon OT entry to room.    General Precautions: Standard, fall   Orthopedic Precautions:N/A   Braces: N/A     Occupational Performance:     Bed Mobility:    · Patient completed Rolling/Turning to Left with  modified independence  · Patient completed Rolling/Turning to Right with modified independence  · Patient completed Scooting/Bridging with supervision  · Patient completed Supine to Sit with supervision  · Patient completed Sit to Supine with minimum assistance     Functional Mobility/Transfers:  · Patient " "completed Sit <> Stand Transfer with moderate assistance  with  rolling walker and x 2 trials   · Functional Mobility: not completed    Activities of Daily Living:  · Grooming: modified independence sitting EOB to comb hair      [facility] [address] 6 Click ADL: 15    Treatment & Education:  Therapist educated patient on body mechanics for bed mobility and sequencing including need to roll to side to sit up, provided verbal cues for positioning to improve independence for sit to stand, able to perform with Mod A x 2 trials with max verbal cues, each trial less than 1 minute due to knees "buckling" and patient impulsively sitting    Patient left supine with all lines intact, call button in reach and nursing notifiedEducation:      GOALS:   Multidisciplinary Problems     Occupational Therapy Goals        Problem: Occupational Therapy Goal    Goal Priority Disciplines Outcome Interventions   Occupational Therapy Goal     OT, PT/OT Ongoing, Progressing    Description: Goals to be met by: 11/18/2020     Patient will increase functional independence with ADLs by performing:    Feeding with Supervision. (GOAL MET)  UE Dressing with Supervision (GOAL MET).  LE Dressing with Moderate Assistance (GOAL MET).  Grooming while seated with Supervision (GOAL MET).  Toileting from bedside commode with Minimal Assistance for hygiene and clothing management.   Sitting at edge of bed x5 minutes with Supervision. (GOAL MET)  Rolling to Bilateral with Supervision. (GOAL MET)  Supine to sit with Minimal Assistance. (GOAL MET)  Squat pivot transfers with Minimal Assistance.  Toilet transfer to bedside commode with Minimal Assistance.  Upper extremity exercise program x10 reps per handout, with assistance as needed. (GOAL MET)                     Time Tracking:     OT Date of Treatment: 11/17/20  OT Start Time: 1127  OT Stop Time: 1146  OT Total Time (min): 19 min    Billable Minutes:Therapeutic Activity 19 minutes    Golden Bartlett OT  11/17/2020    "

## 2020-11-17 NOTE — PLAN OF CARE
Recommendations:  1. continue current cardiac diet.  2. Continue to encourage adequate PO intake via meals and snacks.  3. Boost no longer needed to meet nutritional needs  4. RD to follow.     Goals: Pt. will meet at least 75% ENN via meals by next RD follow up.  Nutrition Goal Status: goal met(goal continues)    Interventions(treatment strategy):  Sodium modified diet  Collaboration with other providers    Nutrition Discharge Planning: Heart healthy diet (low sodium and saturated fat)

## 2020-11-17 NOTE — CARE UPDATE
Patient has follow up appointment with ID tomorrow.     Dr. Ahn states she needs to be seen in person.    [facility] called for transport. They are scheduled to pick patient up at 9:30 AM.    Spoke with Erin, House supervisor, to obtain approval for payment. She confirmed.    Nurse, Fior, is aware of the above.

## 2020-11-17 NOTE — PROGRESS NOTES
[facility]  Adult Nutrition  Progress Note    SUMMARY       Recommendations    Recommendations:  1. continue current cardiac diet.  2. Continue to encourage adequate PO intake via meals and snacks.  3. Boost no longer needed to meet nutritional needs  4. RD to follow.    Goals: Pt. will meet at least 75% ENN via meals by next RD follow up.  Nutrition Goal Status: goal met(goal continues)  Communication of RD Recs: (POC)    Reason for Assessment    Reason For Assessment: RD follow-up  Diagnosis: (Debility)  Relevant Medical History: HTN, CAD, HLD, obesity  Interdisciplinary Rounds: attended    General Information Comments:    10/20:Pt admitted for SWING. She consumed 25-50% of meals yesterday and 0% so far today (very little bites). Per chart review, pt with a 42lb (12.5%) significant wt loss in 3 months , and Pt states she has lost 60lbs since june. NFPE performed today, mild wasting found in temples and orbital region; pt meets criteria for moderate malnutrition.      10/27: Pt states her appettie is good and that she is eating all of her + Boost Pudding, but flowsheets show Meal intake is fluctuating from 0-50%. On IV abx. Positive for diarrhea. Stage 2 wound to right heel. Spoke with MD about nutritional needs, states they will monitor.      11/3-Spoke to pt. Obtained food preferences. Pt. states that Boost Pudding causes stomach discomfort. Pt. States that Boost Plus causes diarrhea. Pt.s intake improving at 75%. Pt. States she doesn't eat 100% of meals due to dislike of vegetables.      11/10-Spoke with patient to obtain current PO intake. Patient states she is consuming 75% of her meals. Boost plus and Boost milkshake d/c due to causing diarhhea.  Patient's intake is adequate and states appetite is good.    11/17-Patient has recorded intake of % of meals and 75% ENN. Noted patient is maintaining weight. Patient states appetite is good and confirmed intake of meals/snacks.    Nutrition  "Discharge Planning: Heart healthy diet (low sodium and saturated fat)    Nutrition Risk Screen    Nutrition Risk Screen: no indicators present    Nutrition/Diet History    Food Preferences: pudding, ice cream, red beans, white beans, cornbread,  meat loaf, scalloped potatoes, baked potatoes  Spiritual, Cultural Beliefs, Gnosticism Practices, Values that Affect Care: no  Food Allergies: NKFA  Factors Affecting Nutritional Intake: impaired cognitive status/motor control    Anthropometrics    Temp: 98 °F (36.7 °C)  Height Method: Stated  Height: 5' 10" (177.8 cm)  Height (inches): 70 in  Weight Method: Bed Scale  Weight: 128.7 kg (283 lb 11.7 oz)  Weight (lb): 283.73 lb  Ideal Body Weight (IBW), Female: 150 lb  % Ideal Body Weight, Female (lb): 195.47 %  BMI (Calculated): 40.7  BMI Grade: greater than 40 - morbid obesity  Usual Body Weight (UBW), kg: (!) 156.8 kg  % Usual Body Weight: 85     Lab/Procedures/Meds    Pertinent Labs Reviewed: reviewed  Pertinent Labs Comments: GFR 56(L)  Pertinent Medications Reviewed: reviewed  Pertinent Medications Comments: No pertinent medications at this time    Estimated/Assessed Needs    Weight Used For Calorie Calculations: 133 kg (293 lb 3.4 oz)  Energy Calorie Requirements (kcal): 1920  Energy Need Method: [place]-St Jeor(no AF)  Protein Requirements: 106-133 g/d  Weight Used For Protein Calculations: 133 kg (293 lb 3.4 oz)     Estimated Fluid Requirement Method: RDA Method(or per MD)  RDA Method (mL): 1920     Nutrition Prescription Ordered    Current Diet Order: cardiac  Oral Nutrition Supplement: Boost pudding TID    Evaluation of Received Nutrient/Fluid Intake    % Kcal Needs: 50  I/O: -6.8L since admit  Fluid Required: meeting needs  Comments: LBM 10/26  % Intake of Estimated Energy Needs: 50 - 75 %  % Meal Intake: 50 - 75 %    Nutrition Risk    Level of Risk/Frequency of Follow-up: low     Assessment and Plan    Nutrition Problem:  (Moderate) Protein-Calorie " Malnutrition  Malnutrition in the context of Chronic Illness/Injury     Related to (etiology):  Inadequate oral intake     Signs and Symptoms (as evidenced by):  Energy Intake: <75% of estimated energy requirement for >/= 1 month  Body Fat Depletion: mild depletion of orbitals   Muscle Mass Depletion: mild depletion of temples   Weight Loss: 12.5%% x 3 months     Interventions(treatment strategy):  Sodium modified diet  Collaboration with other providers  [facility]     Nutrition Diagnosis Status:  Improving       Monitor and Evaluation    Food and Nutrient Intake: energy intake, food and beverage intake  Food and Nutrient Adminstration: diet order  Physical Activity and Function: nutrition-related ADLs and IADLs  Anthropometric Measurements: weight, weight change, body mass index  Biochemical Data, Medical Tests and Procedures: electrolyte and renal panel  Nutrition-Focused Physical Findings: overall appearance     Malnutrition Assessment  Malnutrition Type: chronic illness          Weight Loss (Malnutrition): greater than 7.5% in 3 months  Energy Intake (Malnutrition): less than 75% for greater than or equal to 1 month   Orbital Region (Subcutaneous Fat Loss): mild depletion  Upper Arm Region (Subcutaneous Fat Loss): well nourished  Thoracic and Lumbar Region: well nourished   Islam Region (Muscle Loss): mild depletion  Clavicle Bone Region (Muscle Loss): well nourished  Clavicle and Acromion Bone Region (Muscle Loss): well nourished  Scapular Bone Region (Muscle Loss): well nourished  Dorsal Hand (Muscle Loss): well nourished  Patellar Region (Muscle Loss): well nourished  Anterior Thigh Region (Muscle Loss): well nourished  Posterior Calf Region (Muscle Loss): well nourished               Nutrition Follow-Up    RD Follow-up?: Yes

## 2020-11-17 NOTE — PT/OT/SLP PROGRESS
"Physical Therapy Treatment    Patient Name:  Martina Betancourt   MRN:  [id number]    Recommendations:     Discharge Recommendations:  nursing facility, basic   Discharge Equipment Recommendations: none   Barriers to discharge: Decreased caregiver support    Assessment:     Martina Betancourt is a 70 y.o. female admitted with a medical diagnosis of Debility.  She presents with the following impairments/functional limitations:  weakness, gait instability, impaired cardiopulmonary response to activity, impaired endurance, impaired self care skills, impaired functional mobilty, pain, impaired balance, decreased lower extremity function, decreased safety awareness. Offered patient to sit up in a wheelchair but patient declined due back pain. Noted limited Static standing tolerance today due to inc pain at lower back and Right Knee.    Rehab Prognosis: Fair; patient would benefit from acute skilled PT services to address these deficits and reach maximum level of function.    Recent Surgery: * No surgery found *      Plan:     During this hospitalization, patient to be seen daily to address the identified rehab impairments via gait training, therapeutic activities, therapeutic exercises and progress toward the following goals:    · Plan of Care Expires:  11/20/20    Subjective     Chief Complaint: lower back pain and Right knee pain increases upon standing up today.  Patient/Family Comments/goals: "To control the pain and able to move myself better".  Pain/Comfort:  · Pain Rating 1: 8/10  · Location - Side 1: Right  · Location - Orientation 1: lower  · Location 1: back  · Pain Addressed 1: Reposition  · Pain Rating Post-Intervention 1: 9/10  · Pain Rating 2: 8/10  · Location - Side 2: Right  · Location - Orientation 2: lower  · Location 2: knee  · Pain Addressed 2: Cessation of Activity  · Pain Rating Post-Intervention 2: 9/10      Objective:     Communicated with patient prior to session.  Patient found supine with peripheral " IV, PureWick, oxygen upon PT entry to room.     General Precautions: Standard, fall   Orthopedic Precautions:N/A   Braces: N/A     Functional Mobility:  · Bed Mobility:     · Rolling Left:  supervision  · Rolling Right: supervision  · Scooting: supervision  · Supine to Sit: contact guard assistance  · Sit to Supine: moderate assistance and cues in ascending LE  · Transfers:     · Sit to Stand:  moderate assiotance and cues with standard walker  · Balance: Standing with std walker Static: Fair for ~50 seconds tolerance(1st attempt) ; ~10 seconds tolerance(2nd attempt)      AM-PAC 6 CLICK MOBILITY  Turning over in bed (including adjusting bedclothes, sheets and blankets)?: 3  Sitting down on and standing up from a chair with arms (e.g., wheelchair, bedside commode, etc.): 2  Moving from lying on back to sitting on the side of the bed?: 3  Moving to and from a bed to a chair (including a wheelchair)?: 1  Need to walk in hospital room?: 1  Climbing 3-5 steps with a railing?: 1  Basic Mobility Total Score: 11       Therapeutic Activities and Exercises:   Worked on body mechanics on bed mobility, sitting activity at side of the bed and standing balance and tolerance with std walker.    Patient left supine with all lines intact, call button in reach, bed alarm on and nursing notified..    GOALS:   Multidisciplinary Problems     Physical Therapy Goals        Problem: Physical Therapy Goal    Goal Priority Disciplines Outcome Goal Variances Interventions   Physical Therapy Goal     PT, PT/OT Ongoing, Progressing     Description: Goals to be met by: 11/10/20    Patient will increase functional independence with mobility by performin. Rolling to sides using bed rail with contact guard assistance and cues  2. Supine to sit with minimal assistance and cues  3. Sit to supine with moderate assistance and cues  4. Tolerate Static Sit at side of the bed for 10 minutes with Standby Assistance  5. Bed to chair transfer with  moderate assistance and cues using stand step TECHNIQUE  6. Lower extremity exercise program x15 reps per handout, with assistance as needed                      Time Tracking:     PT Received On: 11/17/20  PT Start Time: 1442     PT Stop Time: 1458  PT Total Time (min): 16 min     Billable Minutes: Therapeutic Activity 16    Treatment Type: Treatment  PT/PTA: PT     PTA Visit Number: 0     Edgar Lamas, PT  11/17/2020

## 2020-11-17 NOTE — PLAN OF CARE
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  Following aspiration biopsy, admitted here for long-term antibiotics. Patient on Vancomycin daily.  Trough drawn today, results 16.8.  Will have repeat trough on 11/18 at 1am   Right brachial PICC line, double lumen.  Red port flushes with blood return, purple lumen clotted.  Dressing change completed today, next change due 11/23/20.  Patient working with PT/OT, working on sitting on edge of bed and standing for short periods to assist with wheelchair transfer upon discharge.   Pain controlled with PRN Norco, 10mg.

## 2020-11-17 NOTE — PLAN OF CARE
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  Following aspiration biopsy, admitted here for long-term antibiotics. Patient on Vancomycin daily.  Trough scheduled to be drawn tomorrow morning at 1am.  Plans for patient to take leave of absence tomorrow to go to [facility] for follow up with Infectious Diseases.   Right brachial PICC line, double lumen.  Red port flushes with blood return, purple lumen clotted.  Patient working with PT/OT, working on sitting on edge of bed and standing for short periods to assist with wheelchair transfer upon discharge.   Pain controlled with PRN Norco, 10mg.

## 2020-11-18 ENCOUNTER — OFFICE VISIT (OUTPATIENT)
Dept: INFECTIOUS DISEASES | Facility: CLINIC | Age: 70
End: 2020-11-18
Payer: MEDICARE

## 2020-11-18 VITALS
HEIGHT: 70 IN | BODY MASS INDEX: 39.76 KG/M2 | WEIGHT: 277.75 LBS | TEMPERATURE: 98 F | DIASTOLIC BLOOD PRESSURE: 59 MMHG | HEART RATE: 70 BPM | SYSTOLIC BLOOD PRESSURE: 113 MMHG

## 2020-11-18 DIAGNOSIS — M46.44 DISCITIS THORACIC REGION: Primary | ICD-10-CM

## 2020-11-18 LAB
ANION GAP SERPL CALC-SCNC: 9 MMOL/L (ref 8–16)
BUN SERPL-MCNC: 15 MG/DL (ref 8–23)
CALCIUM SERPL-MCNC: 10.4 MG/DL (ref 8.7–10.5)
CHLORIDE SERPL-SCNC: 98 MMOL/L (ref 95–110)
CO2 SERPL-SCNC: 31 MMOL/L (ref 23–29)
CREAT SERPL-MCNC: 1.1 MG/DL (ref 0.5–1.4)
EST. GFR  (AFRICAN AMERICAN): 58 ML/MIN/1.73 M^2
EST. GFR  (NON AFRICAN AMERICAN): 50 ML/MIN/1.73 M^2
GLUCOSE SERPL-MCNC: 108 MG/DL (ref 70–110)
POTASSIUM SERPL-SCNC: 4.4 MMOL/L (ref 3.5–5.1)
SODIUM SERPL-SCNC: 138 MMOL/L (ref 136–145)
VANCOMYCIN TROUGH SERPL-MCNC: 18 UG/ML (ref 10–22)

## 2020-11-18 PROCEDURE — 27000221 HC OXYGEN, UP TO 24 HOURS

## 2020-11-18 PROCEDURE — 94799 UNLISTED PULMONARY SVC/PX: CPT

## 2020-11-18 PROCEDURE — 63600175 PHARM REV CODE 636 W HCPCS: Performed by: FAMILY MEDICINE

## 2020-11-18 PROCEDURE — 25000003 PHARM REV CODE 250: Performed by: NURSE PRACTITIONER

## 2020-11-18 PROCEDURE — 94640 AIRWAY INHALATION TREATMENT: CPT

## 2020-11-18 PROCEDURE — 99308 SBSQ NF CARE LOW MDM 20: CPT | Mod: ,,, | Performed by: FAMILY MEDICINE

## 2020-11-18 PROCEDURE — 99999 PR PBB SHADOW E&M-EST. PATIENT-LVL V: ICD-10-PCS | Mod: PBBFAC,,, | Performed by: PHYSICIAN ASSISTANT

## 2020-11-18 PROCEDURE — 94761 N-INVAS EAR/PLS OXIMETRY MLT: CPT

## 2020-11-18 PROCEDURE — 80048 BASIC METABOLIC PNL TOTAL CA: CPT

## 2020-11-18 PROCEDURE — 80202 ASSAY OF VANCOMYCIN: CPT

## 2020-11-18 PROCEDURE — 99215 OFFICE O/P EST HI 40 MIN: CPT | Mod: PBBFAC,25 | Performed by: PHYSICIAN ASSISTANT

## 2020-11-18 PROCEDURE — 25000242 PHARM REV CODE 250 ALT 637 W/ HCPCS: Performed by: NURSE PRACTITIONER

## 2020-11-18 PROCEDURE — 99213 OFFICE O/P EST LOW 20 MIN: CPT | Mod: S$PBB,,, | Performed by: PHYSICIAN ASSISTANT

## 2020-11-18 PROCEDURE — A4216 STERILE WATER/SALINE, 10 ML: HCPCS | Performed by: INTERNAL MEDICINE

## 2020-11-18 PROCEDURE — 25000003 PHARM REV CODE 250: Performed by: STUDENT IN AN ORGANIZED HEALTH CARE EDUCATION/TRAINING PROGRAM

## 2020-11-18 PROCEDURE — 36415 COLL VENOUS BLD VENIPUNCTURE: CPT

## 2020-11-18 PROCEDURE — 99308 PR NURSING FAC CARE, SUBSEQ, MINOR COMPLIC: ICD-10-PCS | Mod: ,,, | Performed by: FAMILY MEDICINE

## 2020-11-18 PROCEDURE — 25000003 PHARM REV CODE 250: Performed by: INTERNAL MEDICINE

## 2020-11-18 PROCEDURE — 25000003 PHARM REV CODE 250: Performed by: FAMILY MEDICINE

## 2020-11-18 PROCEDURE — 99999 PR PBB SHADOW E&M-EST. PATIENT-LVL V: CPT | Mod: PBBFAC,,, | Performed by: PHYSICIAN ASSISTANT

## 2020-11-18 PROCEDURE — 11000004 HC SNF PRIVATE

## 2020-11-18 PROCEDURE — 99213 PR OFFICE/OUTPT VISIT, EST, LEVL III, 20-29 MIN: ICD-10-PCS | Mod: S$PBB,,, | Performed by: PHYSICIAN ASSISTANT

## 2020-11-18 RX ORDER — HYDROCODONE BITARTRATE AND ACETAMINOPHEN 10; 325 MG/1; MG/1
1 TABLET ORAL EVERY 6 HOURS PRN
Status: ON HOLD | COMMUNITY
End: 2020-12-17 | Stop reason: HOSPADM

## 2020-11-18 RX ORDER — BISACODYL 5 MG
5 TABLET, DELAYED RELEASE (ENTERIC COATED) ORAL DAILY PRN
Status: ON HOLD | COMMUNITY
End: 2020-12-17 | Stop reason: HOSPADM

## 2020-11-18 RX ORDER — GUAIFENESIN 600 MG/1
1200 TABLET, EXTENDED RELEASE ORAL 2 TIMES DAILY
Status: ON HOLD | COMMUNITY
End: 2020-12-17 | Stop reason: HOSPADM

## 2020-11-18 RX ORDER — METHOCARBAMOL 500 MG/1
500 TABLET, FILM COATED ORAL 4 TIMES DAILY
Status: ON HOLD | COMMUNITY
End: 2020-12-17 | Stop reason: HOSPADM

## 2020-11-18 RX ADMIN — PRAVASTATIN SODIUM 40 MG: 40 TABLET ORAL at 08:11

## 2020-11-18 RX ADMIN — GUAIFENESIN 600 MG: 600 TABLET, EXTENDED RELEASE ORAL at 07:11

## 2020-11-18 RX ADMIN — Medication 10 ML: at 06:11

## 2020-11-18 RX ADMIN — IPRATROPIUM BROMIDE AND ALBUTEROL SULFATE 3 ML: .5; 3 SOLUTION RESPIRATORY (INHALATION) at 02:11

## 2020-11-18 RX ADMIN — FUROSEMIDE 40 MG: 40 TABLET ORAL at 07:11

## 2020-11-18 RX ADMIN — ACETAMINOPHEN 500 MG: 500 TABLET, FILM COATED ORAL at 04:11

## 2020-11-18 RX ADMIN — ALUMINUM HYDROXIDE, MAGNESIUM HYDROXIDE, AND DIMETHICONE 30 ML: 400; 400; 40 SUSPENSION ORAL at 11:11

## 2020-11-18 RX ADMIN — METHOCARBAMOL TABLETS 500 MG: 500 TABLET, COATED ORAL at 08:11

## 2020-11-18 RX ADMIN — ACETAMINOPHEN 500 MG: 500 TABLET, FILM COATED ORAL at 08:11

## 2020-11-18 RX ADMIN — CLOPIDOGREL 75 MG: 75 TABLET, FILM COATED ORAL at 07:11

## 2020-11-18 RX ADMIN — HYDROCODONE BITARTRATE AND ACETAMINOPHEN 1 TABLET: 10; 325 TABLET ORAL at 07:11

## 2020-11-18 RX ADMIN — METHOCARBAMOL TABLETS 500 MG: 500 TABLET, COATED ORAL at 05:11

## 2020-11-18 RX ADMIN — ASPIRIN 81 MG: 81 TABLET, COATED ORAL at 07:11

## 2020-11-18 RX ADMIN — MICONAZOLE NITRATE: 20 OINTMENT TOPICAL at 08:11

## 2020-11-18 RX ADMIN — MEMANTINE 10 MG: 10 TABLET ORAL at 07:11

## 2020-11-18 RX ADMIN — MEMANTINE 10 MG: 10 TABLET ORAL at 08:11

## 2020-11-18 RX ADMIN — IPRATROPIUM BROMIDE AND ALBUTEROL SULFATE 3 ML: .5; 3 SOLUTION RESPIRATORY (INHALATION) at 07:11

## 2020-11-18 RX ADMIN — VANCOMYCIN HYDROCHLORIDE 750 MG: 750 INJECTION, POWDER, LYOPHILIZED, FOR SOLUTION INTRAVENOUS at 08:11

## 2020-11-18 RX ADMIN — GUAIFENESIN 600 MG: 600 TABLET, EXTENDED RELEASE ORAL at 08:11

## 2020-11-18 RX ADMIN — DULOXETINE 60 MG: 30 CAPSULE, DELAYED RELEASE ORAL at 07:11

## 2020-11-18 RX ADMIN — DONEPEZIL HYDROCHLORIDE 10 MG: 5 TABLET, FILM COATED ORAL at 08:11

## 2020-11-18 RX ADMIN — VANCOMYCIN HYDROCHLORIDE 750 MG: 750 INJECTION, POWDER, LYOPHILIZED, FOR SOLUTION INTRAVENOUS at 01:11

## 2020-11-18 RX ADMIN — FERROUS SULFATE TAB 325 MG (65 MG ELEMENTAL FE) 325 MG: 325 (65 FE) TAB at 08:11

## 2020-11-18 RX ADMIN — METHOCARBAMOL TABLETS 500 MG: 500 TABLET, COATED ORAL at 07:11

## 2020-11-18 RX ADMIN — LISINOPRIL 10 MG: 10 TABLET ORAL at 07:11

## 2020-11-18 RX ADMIN — METOPROLOL SUCCINATE 50 MG: 50 TABLET, EXTENDED RELEASE ORAL at 07:11

## 2020-11-18 RX ADMIN — MICONAZOLE NITRATE: 20 OINTMENT TOPICAL at 07:11

## 2020-11-18 RX ADMIN — ISOSORBIDE MONONITRATE 60 MG: 60 TABLET, EXTENDED RELEASE ORAL at 07:11

## 2020-11-18 RX ADMIN — IPRATROPIUM BROMIDE AND ALBUTEROL SULFATE 3 ML: .5; 3 SOLUTION RESPIRATORY (INHALATION) at 08:11

## 2020-11-18 RX ADMIN — Medication 10 ML: at 01:11

## 2020-11-18 RX ADMIN — PANTOPRAZOLE SODIUM 40 MG: 40 TABLET, DELAYED RELEASE ORAL at 06:11

## 2020-11-18 RX ADMIN — Medication 10 ML: at 07:11

## 2020-11-18 NOTE — PATIENT CARE CONFERENCE
SWING bed Skilled Nursing Patient Care Conference.         Skilled level of care weekly patient care conference complete.   Patient will complete IV antibiotic therapy 12/3/2020. She is progressing slowly with PT. Patient plan is to D/C home with home health.     PT Recommendations:  Discharge Recommendations:  nursing facility, basic   Discharge Equipment Recommendations: none   Barriers to discharge: Decreased caregiver support     Progress toward goals:  Functional Mobility:  · Bed Mobility:     · Rolling Left:  supervision  · Rolling Right: supervision  · Scooting: supervision  · Supine to Sit: contact guard assistance  · Sit to Supine: moderate assistance and cues in ascending LE  · Transfers:     · Sit to Stand:  moderate assiotance and cues with standard walker  · Balance: Standing with std walker Static: Fair for ~50 seconds tolerance(1st attempt) ; ~10 seconds tolerance(2nd attempt)        AM-PAC 6 CLICK MOBILITY  Turning over in bed (including adjusting bedclothes, sheets and blankets)?: 3  Sitting down on and standing up from a chair with arms (e.g., wheelchair, bedside commode, etc.): 2  Moving from lying on back to sitting on the side of the bed?: 3  Moving to and from a bed to a chair (including a wheelchair)?: 1  Need to walk in hospital room?: 1  Climbing 3-5 steps with a railing?: 1  Basic Mobility Total Score: 11                    Therapeutic Activities and Exercises:   Worked on body mechanics on bed mobility, sitting activity at side of the bed and standing balance and tolerance with std walker.         OT Recommendations:  Discharge Recommendations: nursing facility, basic  Discharge Equipment Recommendations:  none  Barriers to discharge      Progress toward goals:   Occupational Performance:      Bed Mobility:    · Patient completed Rolling/Turning to Left with  modified independence  · Patient completed Rolling/Turning to Right with modified independence  · Patient completed  "Scooting/Bridging with supervision  · Patient completed Supine to Sit with supervision  · Patient completed Sit to Supine with minimum assistance      Functional Mobility/Transfers:  · Patient completed Sit <> Stand Transfer with moderate assistance  with  rolling walker and x 2 trials   · Functional Mobility: not completed     Activities of Daily Living:  · Grooming: modified independence sitting EOB to comb hair        [facility] 6 Click ADL: 15     Treatment & Education:  Therapist educated patient on body mechanics for bed mobility and sequencing including need to roll to side to sit up, provided verbal cues for positioning to improve independence for sit to stand, able to perform with Mod A x 2 trials with max verbal cues, each trial less than 1 minute due to knees "buckling" and patient impulsively sitting           Dietary Recommendations:  Recommendations:  1. continue current cardiac diet.  2. Continue to encourage adequate PO intake via meals and snacks.  3. Boost no longer needed to meet nutritional needs  4. RD to follow.     Goals: Pt. will meet at least 75% ENN via meals by next RD follow up.  Nutrition Goal Status: goal met(goal continues)     Interventions(treatment strategy):  Sodium modified diet  Collaboration with other providers     Nutrition Discharge Planning: Heart healthy diet (low sodium and saturated fat        Conversation with patient and family:    Patient is still planning on discharge home.     "

## 2020-11-18 NOTE — PROGRESS NOTES
Subjective:      Patient ID: Martina Betancourt is a 70 y.o. female.    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 Spine infection likely hematogenous from under treated bacteremia. Neurosurgery was been consulted - unfortunately no plans for surgical intervention at this time. She was treated with Vancomycin and Ceftriaxone.  Underwent T9-10 disc space biopsy with IR on 10/16. Cultures negative, though had been on IV antibiotics multiple days prior.  She remained afebrile and HDS. Repeat blood cx sterile.  Plan was for Vancomycin 1750 mg IV q 24 hours x 8 weeks.  Planned end date of IV antibiotics: 12/3/20.  She was discharged to [facility] for continued treatment.    Patient returns today for evaluation in ID clinic.  She had a repeat MRI of T spine 11/9/20 showing:  Impression:  1. Altered signal intensity changes at T9/T10 with evidence of erosive focus about the opposing endplates and evidence of enhancing phlegmonous formation identified in the central component of the disc with central low signal intensity compatible with osteomyelitis/discitis.  Vertical dimensions of the area of interest cause extreme erosion along the anterior portion of the vertebral bodies of interest without evidence of any significant paraspinal component.  2. No significant soft tissue component.  3. Chronic degenerative spondylosis changes of the remaining segments of the thoracic spine.  4. Chronic degenerative changes of the entirety of the cervical spine.    Patient says her back pain has improved significantly but reports she is still unable to stand for more than a couple of seconds.  She is tolerating  the abx without event and PICC is without issues. She has not had NSGY FU.  The patient denies any recent fever, chills, or sweats.          Review of Systems   Constitution: Positive for chills, malaise/fatigue and weight loss. Negative for decreased appetite, fever, night sweats and weight gain.   HENT: Negative for congestion, ear pain, hearing loss, hoarse voice, sore throat and tinnitus.    Eyes: Negative for blurred vision, redness and visual disturbance.   Cardiovascular: Negative for chest pain, leg swelling and palpitations.   Respiratory: Negative for cough, hemoptysis, shortness of breath, sputum production and wheezing.    Endocrine: Negative for cold intolerance and heat intolerance.   Hematologic/Lymphatic: Negative for adenopathy. Does not bruise/bleed easily.   Skin: Negative for dry skin, itching, rash and suspicious lesions.   Musculoskeletal: Positive for back pain, joint pain, myalgias and neck pain.   Gastrointestinal: Positive for constipation, diarrhea and nausea. Negative for abdominal pain, heartburn and vomiting.   Genitourinary: Negative for dysuria, flank pain, frequency, hematuria, hesitancy and urgency.   Neurological: Negative for dizziness, headaches, numbness, paresthesias and weakness.   Psychiatric/Behavioral: Positive for memory loss. Negative for depression. The patient does not have insomnia and is not nervous/anxious.    Allergic/Immunologic: Negative for environmental allergies, HIV exposure, hives and persistent infections.     Objective:   Physical Exam  Constitutional:       General: She is not in acute distress.     Appearance: She is well-developed. She is not diaphoretic.       HENT:      Head: Normocephalic and atraumatic.   Cardiovascular:      Rate and Rhythm: Normal rate and regular rhythm.      Heart sounds: Normal heart sounds. No murmur. No friction rub. No gallop.    Pulmonary:      Effort: Pulmonary effort is normal. No respiratory distress.      Breath sounds:  Normal breath sounds. No wheezing or rales.   Abdominal:      General: Bowel sounds are normal. There is no distension.      Palpations: Abdomen is soft. There is no mass.      Tenderness: There is no abdominal tenderness. There is no guarding or rebound.   Musculoskeletal:      Right lower leg: Edema present.      Left lower leg: Edema present.   Skin:     General: Skin is warm and dry.   Neurological:      Mental Status: She is alert and oriented to person, place, and time.   Psychiatric:         Behavior: Behavior normal.           Assessment:       1. Bradycardia    2. Ebstein anomaly        Appears stable and tolerating abx without event.    Needs NSGY FU  Repeat MRI indicates abscesses seen in posterior T spine have resolved as there is no mention of them on most recent MRI.  CRP much improved from 10/9/20 when it was 132   Plan:   1. Complete Vanc through 12/3 to complete 6 weeks  2. Arrange NSGY FU ASAP  3. Trend sed rate and CRP  4. Needs aggressive rehab  5. Plan discussed with attending at [place]  6. FU at [facility] with Dr. Villagran - likely needs to be virtual

## 2020-11-18 NOTE — SUBJECTIVE & OBJECTIVE
Review of Systems   Constitutional: Positive for activity change. Negative for fever.   Cardiovascular: Negative for chest pain.   Gastrointestinal: Negative for abdominal pain.   Musculoskeletal: Positive for back pain.   Neurological: Positive for weakness.   Psychiatric/Behavioral: Negative for confusion.     Objective:     Vital Signs (Most Recent):  Temp: 98 °F (36.7 °C) (11/18/20 0738)  Pulse: 76 (11/18/20 0738)  Resp: 18 (11/18/20 0740)  BP: (!) 106/59 (11/18/20 0738)  SpO2: 95 % (11/18/20 0738) Vital Signs (24h Range):  Temp:  [97.1 °F (36.2 °C)-98 °F (36.7 °C)] 98 °F (36.7 °C)  Pulse:  [63-76] 76  Resp:  [16-18] 18  SpO2:  [94 %-95 %] 95 %  BP: (106-107)/(51-59) 106/59     Weight: 126.7 kg (279 lb 5.2 oz)  Body mass index is 40.08 kg/m².    Intake/Output Summary (Last 24 hours) at 11/18/2020 0906  Last data filed at 11/18/2020 0626  Gross per 24 hour   Intake 524 ml   Output 2700 ml   Net -2176 ml      Physical Exam  Vitals signs and nursing note reviewed.   Constitutional:       General: She is not in acute distress.     Appearance: She is well-developed. She is obese.   HENT:      Head: Normocephalic and atraumatic.      Nose: Nose normal.      Mouth/Throat:      Mouth: Mucous membranes are moist.      Pharynx: Oropharynx is clear.   Eyes:      Extraocular Movements: Extraocular movements intact.      Conjunctiva/sclera: Conjunctivae normal.      Pupils: Pupils are equal, round, and reactive to light.   Neck:      Musculoskeletal: Neck supple.      Thyroid: No thyromegaly.   Cardiovascular:      Rate and Rhythm: Normal rate and regular rhythm.      Pulses: Normal pulses.      Heart sounds: No murmur. No friction rub. No gallop.    Pulmonary:      Effort: Pulmonary effort is normal. No respiratory distress.      Breath sounds: No wheezing, rhonchi or rales.   Abdominal:      General: Bowel sounds are normal.      Palpations: Abdomen is soft.   Musculoskeletal:      Right lower leg: No edema.      Left  lower leg: No edema.   Lymphadenopathy:      Cervical: No cervical adenopathy.   Skin:     General: Skin is warm and dry.   Neurological:      General: No focal deficit present.      Mental Status: She is alert.      Cranial Nerves: No cranial nerve deficit.      Motor: Weakness present.      Gait: Gait abnormal.      Comments: Obese, not very cooperative with PT.  Max assistance   Psychiatric:         Mood and Affect: Mood normal.         Behavior: Behavior normal.         Significant Labs: procal 0.04  BMP:   Recent Labs   Lab 11/18/20  0633         K 4.4   CL 98   CO2 31*   BUN 15   CREATININE 1.1   CALCIUM 10.4   phos 3.7  Mag 1.9  Lab Results   Component Value Date    WBC 8.83 11/16/2020    HGB 10.9 (L) 11/16/2020    HCT 37.1 11/16/2020    MCV 93 11/16/2020     11/16/2020 11/9 vanc trough 16.5    MRI thoracic spine   1. Altered signal intensity changes at T9/T10 with evidence of erosive focus about the opposing endplates and evidence of enhancing phlegmonous formation identified in the central component of the disc with central low signal intensity compatible with osteomyelitis/discitis.  Vertical dimensions of the area of interest cause extreme erosion along the anterior portion of the vertebral bodies of interest without evidence of any significant paraspinal component.  2. No significant soft tissue component.  3. Chronic degenerative spondylosis changes of the remaining segments of the thoracic spine.  4. Chronic degenerative changes of the entirety of the cervical spine.

## 2020-11-18 NOTE — PLAN OF CARE
Problem: Adult Inpatient Plan of Care  Goal: Rounds/Family Conference  Outcome: Ongoing, Progressing     Problem: Bariatric Environmental Safety  Goal: Safety Maintained with Care  Outcome: Ongoing, Progressing     Problem: Fluid Imbalance (Pneumonia)  Goal: Fluid Balance  Outcome: Ongoing, Progressing     Problem: Infection  Goal: Infection Symptom Resolution  Outcome: Ongoing, Progressing     Problem: Wound  Goal: Optimal Wound Healing  Outcome: Ongoing, Progressing     Problem: Fall Injury Risk  Goal: Absence of Fall and Fall-Related Injury  Outcome: Ongoing, Progressing

## 2020-11-18 NOTE — PROGRESS NOTES
[facility] Medicine  Progress Note    Patient Name: Martina Betancourt  MRN: [id number]  Patient Class: IP- Swing   Admission Date: 10/20/2020  Length of Stay: 29 days  Attending Physician: Vamsi Manuel MD  Primary Care Provider: Joe Fuller Iii, MD        Subjective:     Principal Problem:Debility        HPI:  71yo female patient with hx of alzheiners, CAD, HLD, HTN, myopathy, MI, obesity, sleep apnea and OA (knees non ambulatory uses gonzalez round). She was living at home with her daughter. Recently admitted to [facility] with MRSA bacteremia and subsequent pneumonia treated with Zyvox x 7 days with clearance of her bacteremia now admitted with back pain found to have T9-10 osteo discitis, T8-10 epidural phlegmon with associated paraverterbral abscesses. Spine infection likely hematogenous from under treated bacteremia. Neurosurgery has been consulted. She has been on Vancomycin and Ceftriaxone. Underwent T9-10 disc space biopsy with IR on 10/16. Cultures negative, though had been on IV antibiotics multiple days prior. Afebrile. HDS. Repeat blood cx sterile. ID rec cont vanc 1750mg q 24 till 12/3.     Overview/Hospital Course:  10/23 Here for skilled ; needing IV abx for spinal abscess;  vanc 1750mg q 24 till 12/3  Afebrile , 3LNC - O2 sat 95%   + debility, very deconditioned; bilt LE x 10 reps followed by bed mobility trng and static sit balance and tolerance at side of the bed  C/o back pain with activity; Occupational therapist notes that she is very resistant to movement and c/o severe pain with minimal activity.   Will order toradol 15mg x 1dose IV; pt did much better after toradol rx today per OT. Will order daily prior to OT as tolerated  Monitor renal fx     10/26 here for skilled therapy and cont IV abc. Vanc 1750mg iv every 12hr. Noted elevated WBC this am and she report that she has had diarrhea for the last 4 days.   · PT reports Supine to Sit: maximal assistance, of 2  persons and patient able to reach with UE to push through rail and with improved push off with UE and initiation  · Sit to Supine: maximal assistance of 2 people with improved ability of patient to control descent of upper body  · Transfers:     · Sit to Stand:  moderate assistance, maximal assistance of 2 persons with no AD.  PT and PT tech blocking both knees while assisting pt to elevate hips from bed  Balance: pt able to stand x15 seconds EOB limited largely by knee and back pain.       10/28  She is still on vanc IV daily. No longer with diarrhea. Did have heme positive stools H/H stable on lovenox for DVT prophylaxis and plavix. Will monitor h/h M/Thurs. No fever. No elevated WBC  pt is really immobile.  · Bed Mobility:     · Rolling Left:  moderate assistance  · Rolling Right: moderate assistance  · Scooting: maximal assistance  · Supine to Sit: maximum/moderate assistace and cues  · Sit to Supine: maximum assistace x 1-2 and cues  · Transfers:     · Sit to Stand:  maximum assistance and cues inside the parallel bars with facilitation tech  · Bed to Chair: to wheelchair  with  maximum assistance and cues  using  Slide Board  · Balance: Standing Static inside the parallel bars with Poor grade. Tolerated for ~10 seconds with 2 attempts with  Maximum assistance and cues.  Cont PT daily  10/30  IV  vanc 1,000mg q 24hr x 8 weeks total till 12/3 per ID for discitis; random vanc 12.5 yesterday    No longer with diarrhea. Did have heme positive stools H/H stable on lovenox for DVT prophylaxis and plavix. H&H stable, lovenox stopped. No fever. No elevated WBC. She has productive cough this am. K+ 3.2 yesterday, getting lasix, will repeat KCL 40meq today   pt is really immobile.Standing with RW Static: Poor for ~15 seconds tolerance with max Assistance and cues  Has PICC line- one port clotted;       11/2 she remains on vanc 1000mg  q 24hr x 8 weeks total till 12/3 per ID for discitis. Vanc trough 15.8 10/31/20.  Vss/afebrile. intermittent back pain with prn pain meds helping. She is not doing much with PT. She uses gonzalez round at home but can transfer independently. Here she is max assist     11/4 Remains on vanc 1000mg  q 24hr x 8 weeks total till 12/3 per ID for discitis. Vanc trough 16.6 on 11/2   Afebrile , having regular BMs; getting Norco 10mg q 6   · Continues to require max assist; Sit to Stand:  Max/moderate assistance and cues with standard walker  Balance: Static Stand with Std Walker: Fair- with 2 mins and 7 seconds  tolerance and max/moderate assistance and cues  11/4 Remains on vanc 1000mg  q 24hr x 8 weeks total till 12/3 per ID for discitis. Vanc trough 16.6 on 11/2   Afebrile, WBC nml, creat stable , K+3.4  Requires maximal assist but finally standing with PT with walker .    11/9/20    Remains on vanc 1000mg  q 24hr x 8 weeks total till 12/3 per ID for discitis. VSS/afebrile. No elevated WBC. Last vanc trough 16.3 11/7  She is progressing with PT now min assist from sit to supine and mod assist sit to stand with RW,    11/11 remains on vanc 750mg IV every 24hr, dose monitored by pharm. Last vanc trough 16.5 11/9. Repeat MRI done Monday. Contact with neurosurgery sent to discuss comparison with original johny since she is mid way on IV abx and had no intervention. She has no comoplaints. VSS/labs reviewed. She is standing at bedside 1min and 15 sec with mod assist    11/13 remains on vanc 750mg IV every 24hr, dose monitored by pharm. Last vanc trough 14.8  11/11. Repeat MRI done Monday; mid tx as per ID recommendations;  messaged ID, shows severe Degenerative disease of spine   Able to stand now for over 1m with PT     11/16 remains on vanc every 24hr 750mg. Last vanc trough was 15.9 yesterday. Afebrile, no elevated WBC. She reports that she is feeling well. Getting stronger. Working with PT. Min assist with bed mobility and mod with standing.     11/18/20 planned for OP visit with ID this am   Remains on  vanc every 24hr 750mg. Last vanc trough was 16.8  yesterday. Afebrile, no elevated WBC      Review of Systems   Constitutional: Positive for activity change. Negative for fever.   Cardiovascular: Negative for chest pain.   Gastrointestinal: Negative for abdominal pain.   Musculoskeletal: Positive for back pain.   Neurological: Positive for weakness.   Psychiatric/Behavioral: Negative for confusion.     Objective:     Vital Signs (Most Recent):  Temp: 98 °F (36.7 °C) (11/18/20 0738)  Pulse: 76 (11/18/20 0738)  Resp: 18 (11/18/20 0740)  BP: (!) 106/59 (11/18/20 0738)  SpO2: 95 % (11/18/20 0738) Vital Signs (24h Range):  Temp:  [97.1 °F (36.2 °C)-98 °F (36.7 °C)] 98 °F (36.7 °C)  Pulse:  [63-76] 76  Resp:  [16-18] 18  SpO2:  [94 %-95 %] 95 %  BP: (106-107)/(51-59) 106/59     Weight: 126.7 kg (279 lb 5.2 oz)  Body mass index is 40.08 kg/m².    Intake/Output Summary (Last 24 hours) at 11/18/2020 0906  Last data filed at 11/18/2020 0626  Gross per 24 hour   Intake 524 ml   Output 2700 ml   Net -2176 ml      Physical Exam  Vitals signs and nursing note reviewed.   Constitutional:       General: She is not in acute distress.     Appearance: She is well-developed. She is obese.   HENT:      Head: Normocephalic and atraumatic.      Nose: Nose normal.      Mouth/Throat:      Mouth: Mucous membranes are moist.      Pharynx: Oropharynx is clear.   Eyes:      Extraocular Movements: Extraocular movements intact.      Conjunctiva/sclera: Conjunctivae normal.      Pupils: Pupils are equal, round, and reactive to light.   Neck:      Musculoskeletal: Neck supple.      Thyroid: No thyromegaly.   Cardiovascular:      Rate and Rhythm: Normal rate and regular rhythm.      Pulses: Normal pulses.      Heart sounds: No murmur. No friction rub. No gallop.    Pulmonary:      Effort: Pulmonary effort is normal. No respiratory distress.      Breath sounds: No wheezing, rhonchi or rales.   Abdominal:      General: Bowel sounds are normal.       Palpations: Abdomen is soft.   Musculoskeletal:      Right lower leg: No edema.      Left lower leg: No edema.   Lymphadenopathy:      Cervical: No cervical adenopathy.   Skin:     General: Skin is warm and dry.   Neurological:      General: No focal deficit present.      Mental Status: She is alert.      Cranial Nerves: No cranial nerve deficit.      Motor: Weakness present.      Gait: Gait abnormal.      Comments: Obese, not very cooperative with PT.  Max assistance   Psychiatric:         Mood and Affect: Mood normal.         Behavior: Behavior normal.         Significant Labs: procal 0.04  BMP:   Recent Labs   Lab 11/18/20  0633         K 4.4   CL 98   CO2 31*   BUN 15   CREATININE 1.1   CALCIUM 10.4   phos 3.7  Mag 1.9  Lab Results   Component Value Date    WBC 8.83 11/16/2020    HGB 10.9 (L) 11/16/2020    HCT 37.1 11/16/2020    MCV 93 11/16/2020     11/16/2020       11/9 vanc trough 16.5    MRI thoracic spine   1. Altered signal intensity changes at T9/T10 with evidence of erosive focus about the opposing endplates and evidence of enhancing phlegmonous formation identified in the central component of the disc with central low signal intensity compatible with osteomyelitis/discitis.  Vertical dimensions of the area of interest cause extreme erosion along the anterior portion of the vertebral bodies of interest without evidence of any significant paraspinal component.  2. No significant soft tissue component.  3. Chronic degenerative spondylosis changes of the remaining segments of the thoracic spine.  4. Chronic degenerative changes of the entirety of the cervical spine.      Assessment/Plan:      * Debility  Was walking with walker prior to pneumonia/bacteremia admit last month then after d.c was only w/c bound (gonzalez round) will add pt here and try and get her as strong as poss as she lives at home with family.   10/23 Chronic co back ache but also has recent high ankle fx ; per EDDIE Vinson  NP Spoke with Ariela VASQUES who will see patient while on SWING. She looked over x rays and hx and reports that patient can weight bear as tolerated. Nurse/OT/MD notified    · 10/26 Supine to Sit: maximal assistance, of 2 persons and patient able to reach with UE to push through rail and with improved push off with UE and initiation  · Sit to Supine: maximal assistance of 2 people with improved ability of patient to control descent of upper body  · Transfers:     · Sit to Stand:  moderate assistance, maximal assistance of 2 persons with no AD.  PT and PT tech blocking both knees while assisting pt to elevate hips from bed  Balance: pt able to stand x15 seconds EOB limited largely by knee and back pain.     10/28.  · Bed Mobility:     · Rolling Left:  moderate assistance  · Rolling Right: moderate assistance  · Scooting: maximal assistance  · Supine to Sit: maximum/moderate assistace and cues  · Sit to Supine: maximum assistace x 1-2 and cues  · Transfers:     · Sit to Stand:  maximum assistance and cues inside the parallel bars with facilitation tech  · Bed to Chair: to wheelchair  with  maximum assistance and cues  using  Slide Board  · Balance: Standing Static inside the parallel bars with Poor grade. Tolerated for ~10 seconds with 2 attempts with  Maximum assistance and cues.  10/30 Standing with RW Static: Poor for ~15 seconds tolerance with max Assistance and cues  11/2  wheelchair maximum assistance x 2 and cues  with  slide board  using  slide/scoot tech  11/4 Sit to Stand:  Max/moderate assistance and cues with standard walker  Balance: Static Stand with Std Walker: Fair- with 2 mins and 7 seconds  tolerance and max/moderate assistance and cues  · 11/6 max/moderate assistance and cues  with standard walker  Balance: Standing Static with std walker: Poor+ for 1 minute and 15 seconds(1st attempt); 45 seconds(2nd attempt)    11/9 she is becoming stronger  · Cont with PT  · Rolling Left:  stand by  "assistance  · Rolling Right: stand by assistance  · Scooting: stand by assistance  · Supine to Sit: contact guard assistance  · Sit to Supine: minimum assistance  · Transfers:     Sit to Stand:  moderate assistance with rolling walker.3    11/13/20 Transfers:     · Sit to Stand:  Moderate assistance and cues with standard walker  Balance: Standing with Std Walker Static: Poor+ for  1 minute and  20 seconds(1st attempt); 35 seconds(2nd attempt). woth Moderate Assistance and cues    · 11/16 Scooting: minimum assistance  · Bridging: minimum assistance  · Supine to Sit: minimum assistance  · Sit to Supine: moderate assistance  · Transfers:     · Sit to Stand:  minimum assistance and moderate assistance with standard walker  Balance: Modified independent with static sitting at edge of bed, min assist with static standing with SW     11/18 - Sit to Stand:  moderate assiotance and cues with standard walker  · Balance: Standing with std walker Static: Fair for ~50 seconds tolerance(1st attempt) ; ~10 seconds tolerance(2nd attempt)       Cough productive of purulent sputum  Productive cough this am per nurse- " pale green, grey"   Add mucinex and give neb tx q 6 while awake  No fever, WBC normal   Increase activity ..  Laying flat today. No complaints SOB./cough.  On 2L NC pox 92-94%      Diarrhea  Stools d/c as diarrhea has improved. + heme occult . lovenox stopped cbc stable .    Malnutrition of mild degree  Dietary.  Boost.      Hydronephrosis  Seen on CT 10/7 - u/a was negative .      CAD (coronary artery disease)  Cont asa, plavix, lasix, isosorbide, lisinopril, toprol and statin.      HTN (hypertension)  Increase lisinopril 2.5>10mg daily  10/23 SBP 160s at times; lisinopril just increased will give more time for lisinopril to work before titration  10/26 SBP 140s; cont to monitor  10/28 /80- increase lisinopril  10/30 BP much better 119/59- 138/62  .  11/2 /58.  VSS   11/9 /56- well controlled (cont " lisinopril, lasix, isosorbide, metoprolol).    Discitis  vanc 1450mg IV every 24hr x 8 weeks total till 12/3 per ID ; will stay here for the duration   PT  vanc now 1000mg IV every 24hr till 12/3 per ID last vanc trough 10/31 15.8    Reach out to ns today for guidance on re-imaging  Noted per ID Plan for 6 - 8 weeks of IV antibiotics with repeat MRI at midpoint of treatment as no surgical drainage undertaken.  - ID will follow.  She is currently midpoint; will plan for MRI of thoracic on Monday .  Cont vanc now 750mg IV daily with trough 11/7 16.3 .  11/13 remains max assist; can stand for 1m 20sec; awaiting ID assessment of MRI    11/11 contacted neurosurgery for repeat MRI review. She is only able to stand at bedside with therapy for 1min and 15 sec with mod to max assist. She would be a difficult transfer at this point.   11/16 will repeat message to neurosurgery as well as ID to eval patient maybe virtual visit. She would be a very difficult transfer for in person visit  11/18 no response from ID ; pt set up for [facility] for OP appnt with ID today     Severe obesity (BMI >= 40)  Using boost as she has low appetite and low protein       Obstructive sleep apnea treated with continuous positive airway pressure (CPAP)  continue home cpap      Dementia without behavioral disturbance  Cont namenda and aricept .      VTE Risk Mitigation (From admission, onward)    None          Discharge Planning   BRIT: 12/3/2020     Code Status: DNR   Is the patient medically ready for discharge?:     Reason for patient still in hospital (select all that apply): Treatment  Discharge Plan A: Home with family, Home Health                  Elisa Ahn MD  Department of Hospital Medicine   [facility]

## 2020-11-18 NOTE — PLAN OF CARE
Patient Agrees and Compliant with Plan of Care: Swing Patient  Monitor Vital Signs; Most recent B/P 118/57. Afebrile.  Monitor Breathing Pattern; Bilateral Breath sounds auscultated posteriorly and Diminished throughout. Oxygen saturation 94-95% on Oxygen at 2L  NC . No c/o shortness of breath noted this shift.  Maintain Pain Regimen; Patient did not received Hydrocodone this shift; Scheduled Tylenol  given.Tolerated well.  Administer IV Antibiotics as ordered; Vancomycin 750mg IVPB. Given this shift.  Monitor Vancomycin Trough as ordered; Vancomycin Trough tonight was 18. No change noted in Vancomycin dose per pharmacy. Next Vancomycin Trough to be done on 11-19-20 at 1300.  Maintain PICC to Right Basilic; Red Port flushes well; Unable to flush Purple Port.  Fall Precautions; Maintain Patient Safety; Bed Alarm in use; No falls or injury noted this shift.   Patient allowed time to verbalize; Patient stated she felt she was doing a little better with PT..Patient appeared a little concerned about going to [facility] To see the infectious disease doctor. Informed patient that she would get an early bath and she would be going by [facility]. Verbalized understanding.

## 2020-11-18 NOTE — Clinical Note
EOC 12/3 and has fu with [facility].  Likely needs to be virtual as patient admitted at [place]  - please arrange - thx

## 2020-11-18 NOTE — PLAN OF CARE
Patient admitted as SWING, Assessment complete per flow sheet, AAOX4, RR even unlabored BBS diminished, on 2 L NC. Abdomen soft, non distended, with active bowel sounds x 4 quads.Tolerating diet well. PICC to RT arm SL, purple port does not flush, red port sluggish, Dsg C/D/I. Medications administered per MAR, tolerated well. HERMELINDA heels elevated off bed with pillows, safety precautions maintained, bed alarm on, free from falls/ injury, call bell in reach, instructed to call for needs, voiced understanding, agrees with plan of care  Patient brought to [facility] by [facility] for follow up appointment.

## 2020-11-18 NOTE — ASSESSMENT & PLAN NOTE
Was walking with walker prior to pneumonia/bacteremia admit last month then after d.c was only w/c bound (gonzalez round) will add pt here and try and get her as strong as poss as she lives at home with family.   10/23 Chronic co back ache but also has recent high ankle fx ; per EDDIE Vinson NP Spoke with Ariela VASQUES who will see patient while on SWING. She looked over x rays and hx and reports that patient can weight bear as tolerated. Nurse/OT/MD notified    · 10/26 Supine to Sit: maximal assistance, of 2 persons and patient able to reach with UE to push through rail and with improved push off with UE and initiation  · Sit to Supine: maximal assistance of 2 people with improved ability of patient to control descent of upper body  · Transfers:     · Sit to Stand:  moderate assistance, maximal assistance of 2 persons with no AD.  PT and PT tech blocking both knees while assisting pt to elevate hips from bed  Balance: pt able to stand x15 seconds EOB limited largely by knee and back pain.     10/28.  · Bed Mobility:     · Rolling Left:  moderate assistance  · Rolling Right: moderate assistance  · Scooting: maximal assistance  · Supine to Sit: maximum/moderate assistace and cues  · Sit to Supine: maximum assistace x 1-2 and cues  · Transfers:     · Sit to Stand:  maximum assistance and cues inside the parallel bars with facilitation tech  · Bed to Chair: to wheelchair  with  maximum assistance and cues  using  Slide Board  · Balance: Standing Static inside the parallel bars with Poor grade. Tolerated for ~10 seconds with 2 attempts with  Maximum assistance and cues.  10/30 Standing with RW Static: Poor for ~15 seconds tolerance with max Assistance and cues  11/2  wheelchair maximum assistance x 2 and cues  with  slide board  using  slide/scoot tech  11/4 Sit to Stand:  Max/moderate assistance and cues with standard walker  Balance: Static Stand with Std Walker: Fair- with 2 mins and 7 seconds  tolerance and  max/moderate assistance and cues  · 11/6 max/moderate assistance and cues  with standard walker  Balance: Standing Static with std walker: Poor+ for 1 minute and 15 seconds(1st attempt); 45 seconds(2nd attempt)    11/9 she is becoming stronger  · Cont with PT  · Rolling Left:  stand by assistance  · Rolling Right: stand by assistance  · Scooting: stand by assistance  · Supine to Sit: contact guard assistance  · Sit to Supine: minimum assistance  · Transfers:     Sit to Stand:  moderate assistance with rolling walker.3    11/13/20 Transfers:     · Sit to Stand:  Moderate assistance and cues with standard walker  Balance: Standing with Std Walker Static: Poor+ for  1 minute and  20 seconds(1st attempt); 35 seconds(2nd attempt). woth Moderate Assistance and cues    · 11/16 Scooting: minimum assistance  · Bridging: minimum assistance  · Supine to Sit: minimum assistance  · Sit to Supine: moderate assistance  · Transfers:     · Sit to Stand:  minimum assistance and moderate assistance with standard walker  Balance: Modified independent with static sitting at edge of bed, min assist with static standing with SW     11/18 - Sit to Stand:  moderate assiotance and cues with standard walker  · Balance: Standing with std walker Static: Fair for ~50 seconds tolerance(1st attempt) ; ~10 seconds tolerance(2nd attempt)

## 2020-11-18 NOTE — PT/OT/SLP PROGRESS
Physical Therapy      Patient Name:  Martina Betancourt   MRN:  [id number]    Patient not seen today secondary to Patient unwilling to participate(Patient just got back from MD appointment at [facility] [place] [place]. Feeling tired ,exhausted and back pain.). Will follow-up tomorrow.    Edgar Lamas, PT

## 2020-11-18 NOTE — ASSESSMENT & PLAN NOTE
vanc 1450mg IV every 24hr x 8 weeks total till 12/3 per ID ; will stay here for the duration   PT  vanc now 1000mg IV every 24hr till 12/3 per ID last vanc trough 10/31 15.8    Reach out to ns today for guidance on re-imaging  Noted per ID Plan for 6 - 8 weeks of IV antibiotics with repeat MRI at midpoint of treatment as no surgical drainage undertaken.  - ID will follow.  She is currently midpoint; will plan for MRI of thoracic on Monday .  Cont vanc now 750mg IV daily with trough 11/7 16.3 .  11/13 remains max assist; can stand for 1m 20sec; awaiting ID assessment of MRI    11/11 contacted neurosurgery for repeat MRI review. She is only able to stand at bedside with therapy for 1min and 15 sec with mod to max assist. She would be a difficult transfer at this point.   11/16 will repeat message to neurosurgery as well as ID to eval patient maybe virtual visit. She would be a very difficult transfer for in person visit  11/18 no response from ID ; pt set up for [facility] for OP appnt with ID today

## 2020-11-18 NOTE — PT/OT/SLP PROGRESS
Occupational Therapy      Patient Name:  Martina Betancourt   MRN:  [id number]    Patient not seen today secondary to Patient fatigue, Patient unwilling to participate. Patient refused stating that she just got back from [place] from an appointment and is too tired. Will follow-up as appropriate.    Addis Moseley OT  11/18/2020

## 2020-11-19 LAB
ANION GAP SERPL CALC-SCNC: 10 MMOL/L (ref 8–16)
BASOPHILS # BLD AUTO: 0.04 K/UL (ref 0–0.2)
BASOPHILS NFR BLD: 0.7 % (ref 0–1.9)
BUN SERPL-MCNC: 14 MG/DL (ref 8–23)
CALCIUM SERPL-MCNC: 10.3 MG/DL (ref 8.7–10.5)
CHLORIDE SERPL-SCNC: 98 MMOL/L (ref 95–110)
CO2 SERPL-SCNC: 31 MMOL/L (ref 23–29)
CREAT SERPL-MCNC: 1 MG/DL (ref 0.5–1.4)
DIFFERENTIAL METHOD: ABNORMAL
EOSINOPHIL # BLD AUTO: 0.2 K/UL (ref 0–0.5)
EOSINOPHIL NFR BLD: 3.6 % (ref 0–8)
ERYTHROCYTE [DISTWIDTH] IN BLOOD BY AUTOMATED COUNT: 16.4 % (ref 11.5–14.5)
EST. GFR  (AFRICAN AMERICAN): >60 ML/MIN/1.73 M^2
EST. GFR  (NON AFRICAN AMERICAN): 56 ML/MIN/1.73 M^2
GLUCOSE SERPL-MCNC: 92 MG/DL (ref 70–110)
HCT VFR BLD AUTO: 34 % (ref 37–48.5)
HGB BLD-MCNC: 10.2 G/DL (ref 12–16)
IMM GRANULOCYTES # BLD AUTO: 0.02 K/UL (ref 0–0.04)
IMM GRANULOCYTES NFR BLD AUTO: 0.3 % (ref 0–0.5)
LYMPHOCYTES # BLD AUTO: 1.8 K/UL (ref 1–4.8)
LYMPHOCYTES NFR BLD: 30.9 % (ref 18–48)
MAGNESIUM SERPL-MCNC: 2.3 MG/DL (ref 1.6–2.6)
MCH RBC QN AUTO: 26.7 PG (ref 27–31)
MCHC RBC AUTO-ENTMCNC: 30 G/DL (ref 32–36)
MCV RBC AUTO: 89 FL (ref 82–98)
MONOCYTES # BLD AUTO: 0.7 K/UL (ref 0.3–1)
MONOCYTES NFR BLD: 12.1 % (ref 4–15)
NEUTROPHILS # BLD AUTO: 3.1 K/UL (ref 1.8–7.7)
NEUTROPHILS NFR BLD: 52.4 % (ref 38–73)
NRBC BLD-RTO: 0 /100 WBC
PHOSPHATE SERPL-MCNC: 3.2 MG/DL (ref 2.7–4.5)
PLATELET # BLD AUTO: 340 K/UL (ref 150–350)
PMV BLD AUTO: 9.7 FL (ref 9.2–12.9)
POTASSIUM SERPL-SCNC: 4.1 MMOL/L (ref 3.5–5.1)
RBC # BLD AUTO: 3.82 M/UL (ref 4–5.4)
SODIUM SERPL-SCNC: 139 MMOL/L (ref 136–145)
VANCOMYCIN TROUGH SERPL-MCNC: 19.2 UG/ML (ref 10–22)
WBC # BLD AUTO: 5.86 K/UL (ref 3.9–12.7)

## 2020-11-19 PROCEDURE — 99900035 HC TECH TIME PER 15 MIN (STAT)

## 2020-11-19 PROCEDURE — 25000003 PHARM REV CODE 250: Performed by: FAMILY MEDICINE

## 2020-11-19 PROCEDURE — 97530 THERAPEUTIC ACTIVITIES: CPT

## 2020-11-19 PROCEDURE — 85025 COMPLETE CBC W/AUTO DIFF WBC: CPT

## 2020-11-19 PROCEDURE — 36415 COLL VENOUS BLD VENIPUNCTURE: CPT

## 2020-11-19 PROCEDURE — 25000003 PHARM REV CODE 250: Performed by: INTERNAL MEDICINE

## 2020-11-19 PROCEDURE — 25000003 PHARM REV CODE 250: Performed by: NURSE PRACTITIONER

## 2020-11-19 PROCEDURE — 63600175 PHARM REV CODE 636 W HCPCS: Performed by: FAMILY MEDICINE

## 2020-11-19 PROCEDURE — A4216 STERILE WATER/SALINE, 10 ML: HCPCS | Performed by: INTERNAL MEDICINE

## 2020-11-19 PROCEDURE — 11000004 HC SNF PRIVATE

## 2020-11-19 PROCEDURE — 83735 ASSAY OF MAGNESIUM: CPT

## 2020-11-19 PROCEDURE — 80202 ASSAY OF VANCOMYCIN: CPT

## 2020-11-19 PROCEDURE — 94799 UNLISTED PULMONARY SVC/PX: CPT

## 2020-11-19 PROCEDURE — A4216 STERILE WATER/SALINE, 10 ML: HCPCS | Performed by: NURSE PRACTITIONER

## 2020-11-19 PROCEDURE — 84100 ASSAY OF PHOSPHORUS: CPT

## 2020-11-19 PROCEDURE — 25000242 PHARM REV CODE 250 ALT 637 W/ HCPCS: Performed by: NURSE PRACTITIONER

## 2020-11-19 PROCEDURE — 80048 BASIC METABOLIC PNL TOTAL CA: CPT

## 2020-11-19 PROCEDURE — 94761 N-INVAS EAR/PLS OXIMETRY MLT: CPT

## 2020-11-19 PROCEDURE — 27000221 HC OXYGEN, UP TO 24 HOURS

## 2020-11-19 PROCEDURE — 94640 AIRWAY INHALATION TREATMENT: CPT

## 2020-11-19 RX ADMIN — MICONAZOLE NITRATE: 20 OINTMENT TOPICAL at 08:11

## 2020-11-19 RX ADMIN — PANTOPRAZOLE SODIUM 40 MG: 40 TABLET, DELAYED RELEASE ORAL at 05:11

## 2020-11-19 RX ADMIN — IPRATROPIUM BROMIDE AND ALBUTEROL SULFATE 3 ML: .5; 3 SOLUTION RESPIRATORY (INHALATION) at 07:11

## 2020-11-19 RX ADMIN — HYDROCODONE BITARTRATE AND ACETAMINOPHEN 1 TABLET: 10; 325 TABLET ORAL at 08:11

## 2020-11-19 RX ADMIN — Medication 10 ML: at 01:11

## 2020-11-19 RX ADMIN — HYDROCODONE BITARTRATE AND ACETAMINOPHEN 1 TABLET: 10; 325 TABLET ORAL at 01:11

## 2020-11-19 RX ADMIN — METOPROLOL SUCCINATE 50 MG: 50 TABLET, EXTENDED RELEASE ORAL at 08:11

## 2020-11-19 RX ADMIN — MEMANTINE 10 MG: 10 TABLET ORAL at 08:11

## 2020-11-19 RX ADMIN — Medication 10 ML: at 12:11

## 2020-11-19 RX ADMIN — LISINOPRIL 10 MG: 10 TABLET ORAL at 08:11

## 2020-11-19 RX ADMIN — ISOSORBIDE MONONITRATE 60 MG: 60 TABLET, EXTENDED RELEASE ORAL at 08:11

## 2020-11-19 RX ADMIN — METHOCARBAMOL TABLETS 500 MG: 500 TABLET, COATED ORAL at 01:11

## 2020-11-19 RX ADMIN — Medication 10 ML: at 05:11

## 2020-11-19 RX ADMIN — CLOPIDOGREL 75 MG: 75 TABLET, FILM COATED ORAL at 08:11

## 2020-11-19 RX ADMIN — METHOCARBAMOL TABLETS 500 MG: 500 TABLET, COATED ORAL at 05:11

## 2020-11-19 RX ADMIN — FUROSEMIDE 40 MG: 40 TABLET ORAL at 08:11

## 2020-11-19 RX ADMIN — DONEPEZIL HYDROCHLORIDE 10 MG: 5 TABLET, FILM COATED ORAL at 08:11

## 2020-11-19 RX ADMIN — GUAIFENESIN 600 MG: 600 TABLET, EXTENDED RELEASE ORAL at 08:11

## 2020-11-19 RX ADMIN — ACETAMINOPHEN 500 MG: 500 TABLET, FILM COATED ORAL at 08:11

## 2020-11-19 RX ADMIN — FERROUS SULFATE TAB 325 MG (65 MG ELEMENTAL FE) 325 MG: 325 (65 FE) TAB at 08:11

## 2020-11-19 RX ADMIN — PRAVASTATIN SODIUM 40 MG: 40 TABLET ORAL at 08:11

## 2020-11-19 RX ADMIN — METHOCARBAMOL TABLETS 500 MG: 500 TABLET, COATED ORAL at 08:11

## 2020-11-19 RX ADMIN — IPRATROPIUM BROMIDE AND ALBUTEROL SULFATE 3 ML: .5; 3 SOLUTION RESPIRATORY (INHALATION) at 01:11

## 2020-11-19 RX ADMIN — Medication 10 ML: at 11:11

## 2020-11-19 RX ADMIN — ASPIRIN 81 MG: 81 TABLET, COATED ORAL at 08:11

## 2020-11-19 RX ADMIN — DULOXETINE 60 MG: 30 CAPSULE, DELAYED RELEASE ORAL at 08:11

## 2020-11-19 RX ADMIN — ALUMINUM HYDROXIDE, MAGNESIUM HYDROXIDE, AND DIMETHICONE 30 ML: 400; 400; 40 SUSPENSION ORAL at 08:11

## 2020-11-19 RX ADMIN — IPRATROPIUM BROMIDE AND ALBUTEROL SULFATE 3 ML: .5; 3 SOLUTION RESPIRATORY (INHALATION) at 08:11

## 2020-11-19 RX ADMIN — VANCOMYCIN HYDROCHLORIDE 750 MG: 750 INJECTION, POWDER, LYOPHILIZED, FOR SOLUTION INTRAVENOUS at 07:11

## 2020-11-19 NOTE — PT/OT/SLP PROGRESS
Occupational Therapy   Treatment    Name: Martina Betancourt  MRN: [id number]  Admitting Diagnosis:  Debility       Recommendations:     Discharge Recommendations: nursing facility, basic  Discharge Equipment Recommendations:  hospital bed, lift device  Barriers to discharge:       Assessment:     Martina Betancourt is a 70 y.o. female with a medical diagnosis of Debility.  She presents with confusion this date and difficulty following commands after waking from deep sleep, nursing informed. Completed sit to stand x 2 trials, declined transfer to [facility] [place].     Performance deficits affecting function are weakness, gait instability, impaired cardiopulmonary response to activity, impaired endurance, impaired balance, decreased lower extremity function, decreased safety awareness, pain, impaired cognition, impaired self care skills, impaired functional mobilty.     Rehab Prognosis:  Fair; patient would benefit from acute skilled OT services to address these deficits and reach maximum level of function.       Plan:     Patient to be seen 5 x/week to address the above listed problems via self-care/home management, therapeutic activities, therapeutic exercises  · Plan of Care Expires: 11/26/20  · Plan of Care Reviewed with: patient    Subjective     Pain/Comfort:  · Pain Rating 1: 8/10  · Location - Side 1: Bilateral  · Location - Orientation 1: lower  · Location 1: back  · Pain Addressed 1: Reposition, Cessation of Activity  · Pain Rating Post-Intervention 1: 8/10    Objective:     Communicated with: Nursing prior to session.  Patient found supine with peripheral IV, PureWick, oxygen upon OT entry to room.    General Precautions: Standard, fall   Orthopedic Precautions:N/A   Braces: N/A     Occupational Performance:     Bed Mobility:    · Patient completed Rolling/Turning to Left with  modified independence  · Patient completed Rolling/Turning to Right with modified independence  · Patient completed Scooting/Bridging with modified  independence  · Patient completed Supine to Sit with modified independence  · Patient completed Sit to Supine with modified independence     Functional Mobility/Transfers:  · Patient completed Sit <> Stand Transfer with moderate assistance  with  standard walker   · Functional Mobility: not completed    Activities of Daily Living:  · Patient declined      [facility] 6 Click ADL: 15    Treatment & Education:  Therapist facilitated sit to stand x 2 with mod A with Max verbal cues and demonstration/education on proper mechanics to accomplish full stand, patient with difficulty following commands this date.     Patient left supine with all lines intact, call button in reach and nursing notifiedEducation:      GOALS:   Multidisciplinary Problems     Occupational Therapy Goals        Problem: Occupational Therapy Goal    Goal Priority Disciplines Outcome Interventions   Occupational Therapy Goal     OT, PT/OT Ongoing, Progressing    Description: Goals to be met by: 11/26/2020     Patient will increase functional independence with ADLs by performing:    Feeding with Supervision. (GOAL MET)  UE Dressing with Supervision (GOAL MET).  LE Dressing with Moderate Assistance (GOAL MET).  Grooming while seated with Supervision (GOAL MET).  Sitting at edge of bed x5 minutes with Supervision. (GOAL MET)  Rolling to Bilateral with Supervision. (GOAL MET)  Supine to sit with Minimal Assistance. (GOAL MET)  Upper extremity exercise program x10 reps per handout, with assistance as needed. (GOAL MET)    Squat pivot transfers with Minimal Assistance.  Toilet transfer to bedside commode with Minimal Assistance.  Toileting from bedside commode with Minimal Assistance for hygiene and clothing management.                    Time Tracking:     OT Date of Treatment: 11/19/20  OT Start Time: 1130  OT Stop Time: 1148  OT Total Time (min): 18 min    Billable Minutes:Therapeutic Activity 18 minutes    Golden Bartlett OT  11/19/2020

## 2020-11-19 NOTE — PROGRESS NOTES
Pharmacokinetic Assessment Follow Up: IV Vancomycin    Vancomycin serum concentration assessment(s):    The trough level was drawn correctly and can be used to guide therapy at this time. The measurement is within the desired definitive target range of 15 to 20 mcg/mL.    Vancomycin Regimen Plan:    Change regimen to Vancomycin 750 mg IV every 24 hours with next serum trough concentration measured at 1900 prior to 3rd dose on 11/21/20    Drug levels (last 3 results):  Recent Labs   Lab Result Units 11/18/20  0100 11/19/20  1257   Vancomycin-Trough ug/mL 18.0 19.2       Pharmacy will continue to follow and monitor vancomycin.    Please contact pharmacy at extension [phone] for questions regarding this assessment.    Thank you for the consult,   Davian Guerrero       Patient brief summary:  Martina Betnacourt is a 70 y.o. female initiated on antimicrobial therapy with IV Vancomycin for treatment of bone/joint infection    The patient's current regimen is vancomycin 750mg IV q 24 h    Drug Allergies:   Review of patient's allergies indicates:   Allergen Reactions    Hydroxyzine hcl     Tizanidine        Actual Body Weight:   127.8kg    Renal Function:   Estimated Creatinine Clearance: 74 mL/min (based on SCr of 1 mg/dL).,     Dialysis Method (if applicable):      CBC (last 72 hours):  Recent Labs   Lab Result Units 11/19/20  0547   WBC K/uL 5.86   Hemoglobin g/dL 10.2*   Hematocrit % 34.0*   Platelets K/uL 340   Gran % % 52.4   Lymph % % 30.9   Mono % % 12.1   Eosinophil % % 3.6   Basophil % % 0.7   Differential Method  Automated       Metabolic Panel (last 72 hours):  Recent Labs   Lab Result Units 11/17/20  0622 11/18/20  0633 11/19/20  0547   Sodium mmol/L 138 138 139   Potassium mmol/L 4.3 4.4 4.1   Chloride mmol/L 98 98 98   CO2 mmol/L 29 31* 31*   Glucose mg/dL 95 108 92   BUN mg/dL 13 15 14   Creatinine mg/dL 1.0 1.1 1.0   Magnesium mg/dL  --   --  2.3   Phosphorus mg/dL  --   --  3.2       Vancomycin  Administrations:  vancomycin given in the last 96 hours                   vancomycin 750 mg in dextrose 5 % 250 mL IVPB (ready to mix system) (mg) 750 mg New Bag 11/18/20 2006     750 mg New Bag  0158     750 mg New Bag 11/17/20 0913     750 mg New Bag 11/16/20 1407     750 mg New Bag 11/15/20 1913                Microbiologic Results:  Microbiology Results (last 7 days)     ** No results found for the last 168 hours. **

## 2020-11-19 NOTE — PLAN OF CARE
Patient admitted as SWING, Assessment complete per flow sheet, AAOX4, RR even unlabored BBS diminished, on 2 L NC. Abdomen soft, non distended, with active bowel sounds x 4 quads.Tolerating diet well. PICC to RT arm SL, purple port does not flush, red port sluggish, Dsg C/D/I. Medications administered per MAR, tolerated well. HERMELINDA heels elevated off bed with pillows, safety precautions maintained, bed alarm on, free from falls/ injury, call bell in reach, instructed to call for needs, voiced understanding.agrees with plan of care  Patient refused a bath several times through out shift. States her back hurts

## 2020-11-19 NOTE — PLAN OF CARE
Patient here as swing patient to work with PT. Patient is compliant with fluid and medication management. Tolerating IV antibiotics well. Able to take oral medications without difficulty. Patient is understanding and compliant with lab draws and procedures. Patient is free from falls and injury. VSS. No complaints of pain so far this shift. Plan of care reviewed and agreed upon with patient. Will continue to monitor.

## 2020-11-19 NOTE — PT/OT/SLP PROGRESS
"Physical Therapy Treatment    Patient Name:  Martina Betancourt   MRN:  [id number]    Recommendations:     Discharge Recommendations:  nursing facility, basic   Discharge Equipment Recommendations: hospital bed, lift device   Barriers to discharge: Decreased caregiver support    Assessment:     Martina Betancourt is a 70 y.o. female admitted with a medical diagnosis of Debility.  She presents with the following impairments/functional limitations:  weakness, gait instability, impaired cardiopulmonary response to activity, impaired endurance, impaired self care skills, pain, impaired balance, decreased lower extremity function, decreased safety awareness. Patient is showing gradual progress in Therapy. Increased static stand tolerance today with lesser physical assistance needed.    Rehab Prognosis: Fair; patient would benefit from acute skilled PT services to address these deficits and reach maximum level of function.    Recent Surgery: * No surgery found *      Plan:     During this hospitalization, patient to be seen daily to address the identified rehab impairments via gait training, therapeutic activities, therapeutic exercises and progress toward the following goals:    · Plan of Care Expires:  11/20/20    Subjective     Chief Complaint: chronic pain at Right lower back and Right knee upon standing up  Patient/Family Comments/goals: "To control the pain and able to move better"  Pain/Comfort:  · Pain Rating 1: 8/10  · Location - Side 1: Right  · Location - Orientation 1: lower  · Location 1: back  · Pain Addressed 1: Cessation of Activity, Reposition, Pre-medicate for activity  · Pain Rating Post-Intervention 1: 8/10  · Pain Rating 2: 8/10  · Location - Side 2: Right  · Location - Orientation 2: lower  · Location 2: knee  · Pain Addressed 2: Cessation of Activity, Pre-medicate for activity  · Pain Rating Post-Intervention 2: 8/10      Objective:     Communicated with patient  prior to session.  Patient found supine " with peripheral IV, PureWick, oxygen upon PT entry to room.     General Precautions: Standard, fall   Orthopedic Precautions:N/A   Braces: N/A     Functional Mobility:  · Bed Mobility:     · Rolling Left:  contact guard assistance and cues using bed  rail  · Rolling Right: contact guard assistance and cues using bed  rail  · Scooting: supervision  · Supine to Sit: contact guard assistance  · Sit to Supine: moderate assistance and cues in ascending bilat LE  · Transfers:     · Sit to Stand:  moderate assistance with standard walker  · Balance: Standing with std walker Static: Poor+ for 1 minute and 20 seconds tolerance(1st attempt); 40 seconds(2nd attempt)      AM-PAC 6 CLICK MOBILITY  Turning over in bed (including adjusting bedclothes, sheets and blankets)?: 3  Sitting down on and standing up from a chair with arms (e.g., wheelchair, bedside commode, etc.): 2  Moving from lying on back to sitting on the side of the bed?: 3  Moving to and from a bed to a chair (including a wheelchair)?: 1  Need to walk in hospital room?: 1  Climbing 3-5 steps with a railing?: 1  Basic Mobility Total Score: 11       Therapeutic Activities and Exercises:   Implemented body mechanics on bed mobility and sit to stand transfers with std walker followed by Static Stand balance and tolerance.    Patient left supine with all lines intact, call button in reach, bed alarm on and nursing notified..    GOALS:   Multidisciplinary Problems     Physical Therapy Goals        Problem: Physical Therapy Goal    Goal Priority Disciplines Outcome Goal Variances Interventions   Physical Therapy Goal     PT, PT/OT Ongoing, Progressing     Description: Goals to be met by: 11/10/20    Patient will increase functional independence with mobility by performin. Rolling to sides using bed rail with contact guard assistance and cues  2. Supine to sit with minimal assistance and cues  3. Sit to supine with moderate assistance and cues  4. Tolerate Static  Sit at side of the bed for 10 minutes with Standby Assistance  5. Bed to chair transfer with moderate assistance and cues using stand step TECHNIQUE  6. Lower extremity exercise program x15 reps per handout, with assistance as needed                      Time Tracking:     PT Received On: 11/19/20  PT Start Time: 1303     PT Stop Time: 1323  PT Total Time (min): 20 min     Billable Minutes: Therapeutic Activity 20    Treatment Type: Treatment  PT/PTA: PT     PTA Visit Number: 0     Edgar Lamas, PT  11/19/2020

## 2020-11-20 LAB
ANION GAP SERPL CALC-SCNC: 10 MMOL/L (ref 8–16)
BUN SERPL-MCNC: 14 MG/DL (ref 8–23)
CALCIUM SERPL-MCNC: 10.3 MG/DL (ref 8.7–10.5)
CHLORIDE SERPL-SCNC: 98 MMOL/L (ref 95–110)
CO2 SERPL-SCNC: 30 MMOL/L (ref 23–29)
CREAT SERPL-MCNC: 1.2 MG/DL (ref 0.5–1.4)
CRP SERPL-MCNC: 16.2 MG/L (ref 0–8.2)
ERYTHROCYTE [SEDIMENTATION RATE] IN BLOOD BY WESTERGREN METHOD: 44 MM/HR (ref 0–20)
EST. GFR  (AFRICAN AMERICAN): 52 ML/MIN/1.73 M^2
EST. GFR  (NON AFRICAN AMERICAN): 45 ML/MIN/1.73 M^2
GLUCOSE SERPL-MCNC: 87 MG/DL (ref 70–110)
POTASSIUM SERPL-SCNC: 4 MMOL/L (ref 3.5–5.1)
SODIUM SERPL-SCNC: 138 MMOL/L (ref 136–145)

## 2020-11-20 PROCEDURE — 85651 RBC SED RATE NONAUTOMATED: CPT

## 2020-11-20 PROCEDURE — 94799 UNLISTED PULMONARY SVC/PX: CPT

## 2020-11-20 PROCEDURE — 94640 AIRWAY INHALATION TREATMENT: CPT

## 2020-11-20 PROCEDURE — 11000004 HC SNF PRIVATE

## 2020-11-20 PROCEDURE — 25000003 PHARM REV CODE 250: Performed by: FAMILY MEDICINE

## 2020-11-20 PROCEDURE — 25000003 PHARM REV CODE 250: Performed by: NURSE PRACTITIONER

## 2020-11-20 PROCEDURE — A4216 STERILE WATER/SALINE, 10 ML: HCPCS | Performed by: NURSE PRACTITIONER

## 2020-11-20 PROCEDURE — 63600175 PHARM REV CODE 636 W HCPCS: Performed by: FAMILY MEDICINE

## 2020-11-20 PROCEDURE — 97530 THERAPEUTIC ACTIVITIES: CPT

## 2020-11-20 PROCEDURE — 99309 PR NURSING FAC CARE, SUBSEQ, SIGNIF COMPLIC: ICD-10-PCS | Mod: ,,, | Performed by: INTERNAL MEDICINE

## 2020-11-20 PROCEDURE — 80048 BASIC METABOLIC PNL TOTAL CA: CPT

## 2020-11-20 PROCEDURE — 99309 SBSQ NF CARE MODERATE MDM 30: CPT | Mod: ,,, | Performed by: INTERNAL MEDICINE

## 2020-11-20 PROCEDURE — 36415 COLL VENOUS BLD VENIPUNCTURE: CPT

## 2020-11-20 PROCEDURE — 97535 SELF CARE MNGMENT TRAINING: CPT

## 2020-11-20 PROCEDURE — 25000242 PHARM REV CODE 250 ALT 637 W/ HCPCS: Performed by: NURSE PRACTITIONER

## 2020-11-20 PROCEDURE — 27000221 HC OXYGEN, UP TO 24 HOURS

## 2020-11-20 PROCEDURE — 94761 N-INVAS EAR/PLS OXIMETRY MLT: CPT

## 2020-11-20 PROCEDURE — 63600175 PHARM REV CODE 636 W HCPCS: Performed by: INTERNAL MEDICINE

## 2020-11-20 RX ORDER — ENOXAPARIN SODIUM 100 MG/ML
40 INJECTION SUBCUTANEOUS EVERY 24 HOURS
Status: DISCONTINUED | OUTPATIENT
Start: 2020-11-20 | End: 2020-11-21

## 2020-11-20 RX ADMIN — ALUMINUM HYDROXIDE, MAGNESIUM HYDROXIDE, AND DIMETHICONE 30 ML: 400; 400; 40 SUSPENSION ORAL at 08:11

## 2020-11-20 RX ADMIN — METOPROLOL SUCCINATE 50 MG: 50 TABLET, EXTENDED RELEASE ORAL at 09:11

## 2020-11-20 RX ADMIN — HYDROCODONE BITARTRATE AND ACETAMINOPHEN 1 TABLET: 10; 325 TABLET ORAL at 08:11

## 2020-11-20 RX ADMIN — Medication 10 ML: at 05:11

## 2020-11-20 RX ADMIN — IPRATROPIUM BROMIDE AND ALBUTEROL SULFATE 3 ML: .5; 3 SOLUTION RESPIRATORY (INHALATION) at 07:11

## 2020-11-20 RX ADMIN — VANCOMYCIN HYDROCHLORIDE 750 MG: 750 INJECTION, POWDER, LYOPHILIZED, FOR SOLUTION INTRAVENOUS at 08:11

## 2020-11-20 RX ADMIN — METHOCARBAMOL TABLETS 500 MG: 500 TABLET, COATED ORAL at 09:11

## 2020-11-20 RX ADMIN — ENOXAPARIN SODIUM 40 MG: 40 INJECTION SUBCUTANEOUS at 04:11

## 2020-11-20 RX ADMIN — PRAVASTATIN SODIUM 40 MG: 40 TABLET ORAL at 08:11

## 2020-11-20 RX ADMIN — MICONAZOLE NITRATE: 20 OINTMENT TOPICAL at 09:11

## 2020-11-20 RX ADMIN — IPRATROPIUM BROMIDE AND ALBUTEROL SULFATE 3 ML: .5; 3 SOLUTION RESPIRATORY (INHALATION) at 01:11

## 2020-11-20 RX ADMIN — DULOXETINE 60 MG: 30 CAPSULE, DELAYED RELEASE ORAL at 09:11

## 2020-11-20 RX ADMIN — GUAIFENESIN 600 MG: 600 TABLET, EXTENDED RELEASE ORAL at 09:11

## 2020-11-20 RX ADMIN — ISOSORBIDE MONONITRATE 60 MG: 60 TABLET, EXTENDED RELEASE ORAL at 09:11

## 2020-11-20 RX ADMIN — ACETAMINOPHEN 500 MG: 500 TABLET, FILM COATED ORAL at 09:11

## 2020-11-20 RX ADMIN — FERROUS SULFATE TAB 325 MG (65 MG ELEMENTAL FE) 325 MG: 325 (65 FE) TAB at 08:11

## 2020-11-20 RX ADMIN — HYDROCODONE BITARTRATE AND ACETAMINOPHEN 1 TABLET: 10; 325 TABLET ORAL at 12:11

## 2020-11-20 RX ADMIN — MEMANTINE 10 MG: 10 TABLET ORAL at 08:11

## 2020-11-20 RX ADMIN — CLOPIDOGREL 75 MG: 75 TABLET, FILM COATED ORAL at 09:11

## 2020-11-20 RX ADMIN — FUROSEMIDE 40 MG: 40 TABLET ORAL at 09:11

## 2020-11-20 RX ADMIN — LISINOPRIL 10 MG: 10 TABLET ORAL at 09:11

## 2020-11-20 RX ADMIN — METHOCARBAMOL TABLETS 500 MG: 500 TABLET, COATED ORAL at 12:11

## 2020-11-20 RX ADMIN — Medication 10 ML: at 11:11

## 2020-11-20 RX ADMIN — ASPIRIN 81 MG: 81 TABLET, COATED ORAL at 09:11

## 2020-11-20 RX ADMIN — GUAIFENESIN 600 MG: 600 TABLET, EXTENDED RELEASE ORAL at 08:11

## 2020-11-20 RX ADMIN — PANTOPRAZOLE SODIUM 40 MG: 40 TABLET, DELAYED RELEASE ORAL at 05:11

## 2020-11-20 RX ADMIN — METHOCARBAMOL TABLETS 500 MG: 500 TABLET, COATED ORAL at 08:11

## 2020-11-20 RX ADMIN — Medication 10 ML: at 06:11

## 2020-11-20 RX ADMIN — MEMANTINE 10 MG: 10 TABLET ORAL at 09:11

## 2020-11-20 RX ADMIN — ACETAMINOPHEN 500 MG: 500 TABLET, FILM COATED ORAL at 08:11

## 2020-11-20 RX ADMIN — DONEPEZIL HYDROCHLORIDE 10 MG: 5 TABLET, FILM COATED ORAL at 08:11

## 2020-11-20 RX ADMIN — METHOCARBAMOL TABLETS 500 MG: 500 TABLET, COATED ORAL at 04:11

## 2020-11-20 RX ADMIN — MICONAZOLE NITRATE: 20 OINTMENT TOPICAL at 08:11

## 2020-11-20 RX ADMIN — HYDROCODONE BITARTRATE AND ACETAMINOPHEN 1 TABLET: 10; 325 TABLET ORAL at 02:11

## 2020-11-20 NOTE — ASSESSMENT & PLAN NOTE
Stools d/c as diarrhea has improved. + heme occult . lovenox stopped cbc stable     11/20: patient still with significant immobility. H/H stable and no gross bleeding. Will resume lovenox.

## 2020-11-20 NOTE — ASSESSMENT & PLAN NOTE
vanc 1450mg IV every 24hr x 8 weeks total till 12/3 per ID ; will stay here for the duration   PT  vanc now 1000mg IV every 24hr till 12/3 per ID last vanc trough 10/31 15.8    Reach out to ns today for guidance on re-imaging  Noted per ID Plan for 6 - 8 weeks of IV antibiotics with repeat MRI at midpoint of treatment as no surgical drainage undertaken.  - ID will follow.  She is currently midpoint; will plan for MRI of thoracic on Monday .  Cont vanc now 750mg IV daily with trough 11/7 16.3 .  11/13 remains max assist; can stand for 1m 20sec; awaiting ID assessment of MRI    11/11 contacted neurosurgery for repeat MRI review. She is only able to stand at bedside with therapy for 1min and 15 sec with mod to max assist. She would be a difficult transfer at this point.   11/16 will repeat message to neurosurgery as well as ID to eval patient maybe virtual visit. She would be a very difficult transfer for in person visit  11/18 no response from ID ; pt set up for AYANNA for OP appnt with ID today   11/20 Still waiting on ID recs, multiple messages sent to neurosurgery; Georgina Morrison and Luis E Calabrese as well as ID; Neftali Rm. We are trying to follow discharge plan from acute stay with repeat MRI and ID follow up and awaiting recommendations. Cont current treatment pending recs

## 2020-11-20 NOTE — ASSESSMENT & PLAN NOTE
Was walking with walker prior to pneumonia/bacteremia admit last month then after d.c was only w/c bound (gonzalez round) will add pt here and try and get her as strong as poss as she lives at home with family.   10/23 Chronic co back ache but also has recent high ankle fx ; per EDDIE Vinson NP Spoke with Ariela VASQUES who will see patient while on SWING. She looked over x rays and hx and reports that patient can weight bear as tolerated. Nurse/OT/MD notified    · 10/26 Supine to Sit: maximal assistance, of 2 persons and patient able to reach with UE to push through rail and with improved push off with UE and initiation  · Sit to Supine: maximal assistance of 2 people with improved ability of patient to control descent of upper body  · Transfers:     · Sit to Stand:  moderate assistance, maximal assistance of 2 persons with no AD.  PT and PT tech blocking both knees while assisting pt to elevate hips from bed  Balance: pt able to stand x15 seconds EOB limited largely by knee and back pain.     10/28.  · Bed Mobility:     · Rolling Left:  moderate assistance  · Rolling Right: moderate assistance  · Scooting: maximal assistance  · Supine to Sit: maximum/moderate assistace and cues  · Sit to Supine: maximum assistace x 1-2 and cues  · Transfers:     · Sit to Stand:  maximum assistance and cues inside the parallel bars with facilitation tech  · Bed to Chair: to wheelchair  with  maximum assistance and cues  using  Slide Board  · Balance: Standing Static inside the parallel bars with Poor grade. Tolerated for ~10 seconds with 2 attempts with  Maximum assistance and cues.  10/30 Standing with RW Static: Poor for ~15 seconds tolerance with max Assistance and cues  11/2  wheelchair maximum assistance x 2 and cues  with  slide board  using  slide/scoot tech  11/4 Sit to Stand:  Max/moderate assistance and cues with standard walker  Balance: Static Stand with Std Walker: Fair- with 2 mins and 7 seconds  tolerance and  max/moderate assistance and cues  · 11/6 max/moderate assistance and cues  with standard walker  Balance: Standing Static with std walker: Poor+ for 1 minute and 15 seconds(1st attempt); 45 seconds(2nd attempt)    11/9 she is becoming stronger  · Cont with PT  · Rolling Left:  stand by assistance  · Rolling Right: stand by assistance  · Scooting: stand by assistance  · Supine to Sit: contact guard assistance  · Sit to Supine: minimum assistance  · Transfers:     Sit to Stand:  moderate assistance with rolling walker.3    11/13/20 Transfers:     · Sit to Stand:  Moderate assistance and cues with standard walker  Balance: Standing with Std Walker Static: Poor+ for  1 minute and  20 seconds(1st attempt); 35 seconds(2nd attempt). woth Moderate Assistance and cues    · 11/16 Scooting: minimum assistance  · Bridging: minimum assistance  · Supine to Sit: minimum assistance  · Sit to Supine: moderate assistance  · Transfers:     · Sit to Stand:  minimum assistance and moderate assistance with standard walker  Balance: Modified independent with static sitting at edge of bed, min assist with static standing with SW     11/18 - Sit to Stand:  moderate assiotance and cues with standard walker  · Balance: Standing with std walker Static: Fair for ~50 seconds tolerance(1st attempt) ; ~10 seconds tolerance(2nd attempt)  11/19 pt was able stand up and transfer  to Bedside commode for the first time today

## 2020-11-20 NOTE — SUBJECTIVE & OBJECTIVE
Review of Systems   Constitutional: Positive for activity change. Negative for fever.   Cardiovascular: Negative for chest pain.   Gastrointestinal: Negative for abdominal pain.   Musculoskeletal: Positive for back pain.   Neurological: Positive for weakness.   Psychiatric/Behavioral: Negative for confusion.     Objective:     Vital Signs (Most Recent):  Temp: 97.2 °F (36.2 °C) (11/20/20 0711)  Pulse: 69 (11/20/20 0711)  Resp: 17 (11/20/20 0711)  BP: (!) 142/64 (11/20/20 0711)  SpO2: 97 % (11/20/20 0711) Vital Signs (24h Range):  Temp:  [97.2 °F (36.2 °C)-98.3 °F (36.8 °C)] 97.2 °F (36.2 °C)  Pulse:  [67-80] 69  Resp:  [16-18] 17  SpO2:  [95 %-98 %] 97 %  BP: (124-146)/(59-65) 142/64     Weight: 127.3 kg (280 lb 10.3 oz)  Body mass index is 40.27 kg/m².    Intake/Output Summary (Last 24 hours) at 11/20/2020 0758  Last data filed at 11/19/2020 1500  Gross per 24 hour   Intake --   Output 700 ml   Net -700 ml      Physical Exam  Vitals signs and nursing note reviewed.   Constitutional:       General: She is not in acute distress.     Appearance: She is well-developed. She is obese.   HENT:      Head: Normocephalic and atraumatic.      Nose: Nose normal.      Mouth/Throat:      Mouth: Mucous membranes are moist.      Pharynx: Oropharynx is clear.   Eyes:      Extraocular Movements: Extraocular movements intact.      Conjunctiva/sclera: Conjunctivae normal.      Pupils: Pupils are equal, round, and reactive to light.   Neck:      Musculoskeletal: Neck supple.      Thyroid: No thyromegaly.   Cardiovascular:      Rate and Rhythm: Normal rate and regular rhythm.      Pulses: Normal pulses.      Heart sounds: No murmur. No friction rub. No gallop.    Pulmonary:      Effort: Pulmonary effort is normal. No respiratory distress.      Breath sounds: No wheezing, rhonchi or rales.   Abdominal:      General: Bowel sounds are normal.      Palpations: Abdomen is soft.   Musculoskeletal:      Right lower leg: No edema.      Left  lower leg: No edema.   Lymphadenopathy:      Cervical: No cervical adenopathy.   Skin:     General: Skin is warm and dry.   Neurological:      General: No focal deficit present.      Mental Status: She is alert.      Cranial Nerves: No cranial nerve deficit.      Motor: Weakness present.      Gait: Gait abnormal.      Comments: Obese, not very cooperative with PT due to pain.  Max assistance   Psychiatric:         Mood and Affect: Mood normal.         Behavior: Behavior normal.         Significant Labs: procal 0.04  BMP:   Recent Labs   Lab 11/19/20  0547   GLU 92      K 4.1   CL 98   CO2 31*   BUN 14   CREATININE 1.0   CALCIUM 10.3   MG 2.3   phos 3.7  Mag 1.9  Lab Results   Component Value Date    WBC 5.86 11/19/2020    HGB 10.2 (L) 11/19/2020    HCT 34.0 (L) 11/19/2020    MCV 89 11/19/2020     11/19/2020 11/9 vanc trough 16.5    MRI thoracic spine   1. Altered signal intensity changes at T9/T10 with evidence of erosive focus about the opposing endplates and evidence of enhancing phlegmonous formation identified in the central component of the disc with central low signal intensity compatible with osteomyelitis/discitis.  Vertical dimensions of the area of interest cause extreme erosion along the anterior portion of the vertebral bodies of interest without evidence of any significant paraspinal component.  2. No significant soft tissue component.  3. Chronic degenerative spondylosis changes of the remaining segments of the thoracic spine.  4. Chronic degenerative changes of the entirety of the cervical spine.

## 2020-11-20 NOTE — PT/OT/SLP PROGRESS
Occupational Therapy   Treatment    Name: Martina Betancourt  MRN: [id number]  Admitting Diagnosis:  Debility       Recommendations:     Discharge Recommendations: nursing facility, basic  Discharge Equipment Recommendations:  hospital bed, lift device  Barriers to discharge:       Assessment:     Martina Betancourt is a 70 y.o. female with a medical diagnosis of Debility.  She presents with good participation in drop arm commode transfer with Mod-Max A with scoot transfer and Max A for toileting for hygiene.     Performance deficits affecting function are weakness, gait instability, impaired cardiopulmonary response to activity, impaired endurance, impaired balance, decreased lower extremity function, decreased safety awareness, pain, impaired cognition, impaired self care skills, impaired functional mobilty.     Rehab Prognosis:  Fair; patient would benefit from acute skilled OT services to address these deficits and reach maximum level of function.       Plan:     Patient to be seen 5 x/week to address the above listed problems via self-care/home management, therapeutic activities, therapeutic exercises  · Plan of Care Expires: 11/26/20  · Plan of Care Reviewed with: patient    Subjective     Pain/Comfort:  · Pain Rating 1: 6/10  · Location - Orientation 1: lower  · Location 1: back  · Pain Addressed 1: Reposition  · Pain Rating Post-Intervention 1: 6/10    Objective:     Communicated with: Nursing prior to session.  Patient found supine with peripheral IV, PureWick, oxygen upon OT entry to room.    General Precautions: Standard, fall   Orthopedic Precautions:N/A   Braces: N/A     Occupational Performance:     Bed Mobility:    · Patient completed Rolling/Turning to Right with supervision  · Patient completed Scooting/Bridging with supervision  · Patient completed Supine to Sit with supervision  · Patient completed Sit to Supine with supervision     Functional Mobility/Transfers:  · Patient completed Toilet Transfer  Scoot Pivot technique with moderate assistance and maximal assistance with  drop arm commode  · Functional Mobility: not completed    Activities of Daily Living:  · Toileting: maximal assistance for hygiene after BM on drop arm commode      [facility] 6 Click ADL: 16    Treatment & Education:  Therapist educated patient on sequencing and positioning for drop arm commode transfer with Mod- Max A with good initiation from patient     Patient left supine with all lines intact, call button in reach and PCT presentEducation:      GOALS:   Multidisciplinary Problems     Occupational Therapy Goals        Problem: Occupational Therapy Goal    Goal Priority Disciplines Outcome Interventions   Occupational Therapy Goal     OT, PT/OT Ongoing, Progressing    Description: Goals to be met by: 11/26/2020     Patient will increase functional independence with ADLs by performing:    Feeding with Supervision. (GOAL MET)  UE Dressing with Supervision (GOAL MET).  LE Dressing with Moderate Assistance (GOAL MET).  Grooming while seated with Supervision (GOAL MET).  Sitting at edge of bed x5 minutes with Supervision. (GOAL MET)  Rolling to Bilateral with Supervision. (GOAL MET)  Supine to sit with Minimal Assistance. (GOAL MET)  Upper extremity exercise program x10 reps per handout, with assistance as needed. (GOAL MET)    Squat pivot transfers with Minimal Assistance.  Toilet transfer to bedside commode with Minimal Assistance.  Toileting from bedside commode with Minimal Assistance for hygiene and clothing management.                    Time Tracking:     OT Date of Treatment: 11/20/20  OT Start Time: 0922  OT Stop Time: 0955  OT Total Time (min): 33 min    Billable Minutes:Self Care/Home Management 33 minutes    Golden Bartlett OT  11/20/2020

## 2020-11-20 NOTE — ASSESSMENT & PLAN NOTE
Seen on CT 10/7 - u/a was negative .  Urinating without issue  No flank pain  No signs of infection  No hematuria

## 2020-11-20 NOTE — PLAN OF CARE
Pt remains on swing for iv antibiotic therapy due to spinal abscesses. Vancomycin daily. PICC line to right upper arm; unable to flush purple port; red port flushes easily. PT and OT consulted. Frequent complaints of back pain; relieved with scheduled and PRN medications. Vitals stable. Afebrile. Maintaining oxygen sats mid to upper 90s on 2 liters via nasal cannula. Pt refusing frequent turns; encouraging turns and using pillows to redistribute weight as much as possible. Tolerating diet. Pt transferred to [facility] with OT this morning for BM. Wound to right heel; painting with betadine daily and using pillow to relieve pressure. External catheter in use. Remains free from injury/falls. Set pt up with my chart so that she can have virtual visits with neurosurgery.  Reviewed plan of care with pt; states agreement.

## 2020-11-20 NOTE — PT/OT/SLP PROGRESS
"Physical Therapy Treatment    Patient Name:  Martina Betancourt   MRN:  [id number]    Recommendations:     Discharge Recommendations:  nursing facility, basic   Discharge Equipment Recommendations: hospital bed   Barriers to discharge: Decreased caregiver support    Assessment:     Martina Betancourt is a 70 y.o. female admitted with a medical diagnosis of Debility.  She presents with the following impairments/functional limitations:  weakness, gait instability, impaired cardiopulmonary response to activity, impaired endurance, impaired self care skills, impaired functional mobilty, decreased lower extremity function, impaired cognition, decreased safety awareness, pain. Tolerated well PT session today with slightly lesser pain at lower back. Able to perform well slide/scoot transfers bed to bedside commode with minimal/contact guard assistance. Required moderate/.minimum assistance bedside commode to bed transfers. Patient decline to transfers in a wheelchair due to intense LBP.    Rehab Prognosis: Fair; patient would benefit from acute skilled PT services to address these deficits and reach maximum level of function.    Recent Surgery: * No surgery found *      Plan:     During this hospitalization, patient to be seen daily to address the identified rehab impairments via gait training, therapeutic activities, therapeutic exercises and progress toward the following goals:    · Plan of Care Expires:  12/01/20    Subjective     Chief Complaint: chronic lower back pain  Patient/Family Comments/goals: "Able to sit up better"  Pain/Comfort:  · Pain Rating 1: 6/10  · Location - Side 1: Bilateral  · Location - Orientation 1: lower  · Location 1: back  · Pain Addressed 1: Reposition  · Pain Rating Post-Intervention 1: 6/10      Objective:     Communicated with patient prior to session.  Patient found supine with PureWick, peripheral IV, oxygen upon PT entry to room.     General Precautions: Standard, fall   Orthopedic " Precautions:N/A   Braces: N/A     Functional Mobility:  · Bed Mobility:     · Rolling Left:  stand by assistance and contact guard assistance  · Rolling Right: stand by assistance and contact guard assistance  · Scooting: stand by assistance  · Supine to Sit: contact guard assistance  · Sit to Supine: moderate assistance  · Transfers:     · Toilet Transfer: bed to bedside commode with  slide/scoot tech transfers  using  draw sheet with minimum/contact guard assistance;  Bedside Commode to Bed transfers with slide/scoot tech using draw sheet with moderaet/minimum assistance.      AM-PAC 6 CLICK MOBILITY  Turning over in bed (including adjusting bedclothes, sheets and blankets)?: 3  Sitting down on and standing up from a chair with arms (e.g., wheelchair, bedside commode, etc.): 2  Moving from lying on back to sitting on the side of the bed?: 3  Moving to and from a bed to a chair (including a wheelchair)?: 3  Need to walk in hospital room?: 1  Climbing 3-5 steps with a railing?: 1  Basic Mobility Total Score: 13       Therapeutic Activities and Exercises:   Worked on body sequence/mechanics on bed mobility, bedside commode transfers and sitting activity/tolerance.    Patient left supine with all lines intact, call button in reach, bed alarm on and nursing notified..    GOALS:   Multidisciplinary Problems     Physical Therapy Goals        Problem: Physical Therapy Goal    Goal Priority Disciplines Outcome Goal Variances Interventions   Physical Therapy Goal     PT, PT/OT Ongoing, Progressing     Description: Goals to be met by: 11/10/20    Patient will increase functional independence with mobility by performin. Rolling to sides using bed rail with contact guard assistance and cues  2. Supine to sit with minimal assistance and cues  3. Sit to supine with moderate assistance and cues  4. Tolerate Static Sit at side of the bed for 10 minutes with Standby Assistance  5. Bed to chair transfer with moderate  assistance and cues using stand step TECHNIQUE  6. Lower extremity exercise program x15 reps per handout, with assistance as needed                      Time Tracking:     PT Received On: 11/20/20  PT Start Time: 1402     PT Stop Time: 1430  PT Total Time (min): 28 min     Billable Minutes: Therapeutic Activity 28    Treatment Type: Treatment  PT/PTA: PT     PTA Visit Number: 0     Edgar Lamas, PT  11/20/2020

## 2020-11-20 NOTE — PLAN OF CARE
Problem: Physical Therapy Goal  Goal: Physical Therapy Goal  Description: Goals to be met by: 11/10/20    Patient will increase functional independence with mobility by performin. Rolling to sides using bed rail with contact guard assistance and cues- met  2. Supine to sit with minimal assistance and cues- met  3. Sit to supine with moderate assistance and cues - met  4. Tolerate Static Sit at side of the bed for 10 minutes with Standby Assistance - met  5. Bed to chair transfer with moderate assistance and cues using stand step TECHNIQUE - not met  6. Lower extremity exercise program x15 reps per handout, with assistance as needed     Outcome: Ongoing, Progressing     20 Revised PT goals  Goals to be met 20:  Rolling to side  using bed rail with supervision/modified independent.  Supine to sit with supervision  Sit to supine with moderate/minimum assistance and cues  Bed to bedside commode transfers with supervision.  Increase Static Stand tolerance with std walker x  ~ 2 minutes.  Lower extremity exercise program x15 reps per handout, with assistance as needed.

## 2020-11-20 NOTE — PROGRESS NOTES
[facility] Medicine  Progress Note    Patient Name: Martina Betancourt  MRN: [id number]  Patient Class: IP- Swing   Admission Date: 10/20/2020  Length of Stay: 31 days  Attending Physician: Vamsi Manuel MD  Primary Care Provider: Joe Fuller Iii, MD        Subjective:     Principal Problem:Debility        HPI:  69yo female patient with hx of alzheiners, CAD, HLD, HTN, myopathy, MI, obesity, sleep apnea and OA (knees non ambulatory uses gonzalez round). She was living at home with her daughter. Recently admitted to [facility] with MRSA bacteremia and subsequent pneumonia treated with Zyvox x 7 days with clearance of her bacteremia now admitted with back pain found to have T9-10 osteo discitis, T8-10 epidural phlegmon with associated paraverterbral abscesses. Spine infection likely hematogenous from under treated bacteremia. Neurosurgery has been consulted. She has been on Vancomycin and Ceftriaxone. Underwent T9-10 disc space biopsy with IR on 10/16. Cultures negative, though had been on IV antibiotics multiple days prior. Afebrile. HDS. Repeat blood cx sterile. ID rec cont vanc 1750mg q 24 till 12/3.     Overview/Hospital Course:  10/23 Here for skilled ; needing IV abx for spinal abscess;  vanc 1750mg q 24 till 12/3  Afebrile , 3LNC - O2 sat 95%   + debility, very deconditioned; bilt LE x 10 reps followed by bed mobility trng and static sit balance and tolerance at side of the bed  C/o back pain with activity; Occupational therapist notes that she is very resistant to movement and c/o severe pain with minimal activity.   Will order toradol 15mg x 1dose IV; pt did much better after toradol rx today per OT. Will order daily prior to OT as tolerated  Monitor renal fx     10/26 here for skilled therapy and cont IV abc. Vanc 1750mg iv every 12hr. Noted elevated WBC this am and she report that she has had diarrhea for the last 4 days.   · PT reports Supine to Sit: maximal assistance, of 2  persons and patient able to reach with UE to push through rail and with improved push off with UE and initiation  · Sit to Supine: maximal assistance of 2 people with improved ability of patient to control descent of upper body  · Transfers:     · Sit to Stand:  moderate assistance, maximal assistance of 2 persons with no AD.  PT and PT tech blocking both knees while assisting pt to elevate hips from bed  Balance: pt able to stand x15 seconds EOB limited largely by knee and back pain.       10/28  She is still on vanc IV daily. No longer with diarrhea. Did have heme positive stools H/H stable on lovenox for DVT prophylaxis and plavix. Will monitor h/h M/Thurs. No fever. No elevated WBC  pt is really immobile.  · Bed Mobility:     · Rolling Left:  moderate assistance  · Rolling Right: moderate assistance  · Scooting: maximal assistance  · Supine to Sit: maximum/moderate assistace and cues  · Sit to Supine: maximum assistace x 1-2 and cues  · Transfers:     · Sit to Stand:  maximum assistance and cues inside the parallel bars with facilitation tech  · Bed to Chair: to wheelchair  with  maximum assistance and cues  using  Slide Board  · Balance: Standing Static inside the parallel bars with Poor grade. Tolerated for ~10 seconds with 2 attempts with  Maximum assistance and cues.  Cont PT daily  10/30  IV  vanc 1,000mg q 24hr x 8 weeks total till 12/3 per ID for discitis; random vanc 12.5 yesterday    No longer with diarrhea. Did have heme positive stools H/H stable on lovenox for DVT prophylaxis and plavix. H&H stable, lovenox stopped. No fever. No elevated WBC. She has productive cough this am. K+ 3.2 yesterday, getting lasix, will repeat KCL 40meq today   pt is really immobile.Standing with RW Static: Poor for ~15 seconds tolerance with max Assistance and cues  Has PICC line- one port clotted;       11/2 she remains on vanc 1000mg  q 24hr x 8 weeks total till 12/3 per ID for discitis. Vanc trough 15.8 10/31/20.  Vss/afebrile. intermittent back pain with prn pain meds helping. She is not doing much with PT. She uses gonzalez round at home but can transfer independently. Here she is max assist     11/4 Remains on vanc 1000mg  q 24hr x 8 weeks total till 12/3 per ID for discitis. Vanc trough 16.6 on 11/2   Afebrile , having regular BMs; getting Norco 10mg q 6   · Continues to require max assist; Sit to Stand:  Max/moderate assistance and cues with standard walker  Balance: Static Stand with Std Walker: Fair- with 2 mins and 7 seconds  tolerance and max/moderate assistance and cues  11/4 Remains on vanc 1000mg  q 24hr x 8 weeks total till 12/3 per ID for discitis. Vanc trough 16.6 on 11/2   Afebrile, WBC nml, creat stable , K+3.4  Requires maximal assist but finally standing with PT with walker .    11/9/20    Remains on vanc 1000mg  q 24hr x 8 weeks total till 12/3 per ID for discitis. VSS/afebrile. No elevated WBC. Last vanc trough 16.3 11/7  She is progressing with PT now min assist from sit to supine and mod assist sit to stand with RW,    11/11 remains on vanc 750mg IV every 24hr, dose monitored by pharm. Last vanc trough 16.5 11/9. Repeat MRI done Monday. Contact with neurosurgery sent to discuss comparison with original johny since she is mid way on IV abx and had no intervention. She has no comoplaints. VSS/labs reviewed. She is standing at bedside 1min and 15 sec with mod assist    11/13 remains on vanc 750mg IV every 24hr, dose monitored by pharm. Last vanc trough 14.8  11/11. Repeat MRI done Monday; mid tx as per ID recommendations;  messaged ID, shows severe Degenerative disease of spine   Able to stand now for over 1m with PT     11/16 remains on vanc every 24hr 750mg. Last vanc trough was 15.9 yesterday. Afebrile, no elevated WBC. She reports that she is feeling well. Getting stronger. Working with PT. Min assist with bed mobility and mod with standing.     11/18/20 planned for OP visit with ID this am   Remains on  "vanc every 24hr 750mg. Last vanc trough was 16.8  yesterday. Afebrile, no elevated WBC    11/20/20 Remains on vanc 1000mg  q 24hr x 8 weeks total till 12/3 per ID for discitis. VSS/afebrile. No elevated WBC. Last vanc trough 19.2 yesterday ;  Went to [facility] with ID 11/18; she reports they told her she still has "three pockets of infection"; however, the note is incomplete and does not provide further recommendations;message sent in Epic this am.   11/20 pt was able stand up and transfer  to Bedside commode for the first time today, also had a BM   ESR/CRP pending otherwise labs stable      Review of Systems   Constitutional: Positive for activity change. Negative for fever.   Cardiovascular: Negative for chest pain.   Gastrointestinal: Negative for abdominal pain.   Musculoskeletal: Positive for back pain.   Neurological: Positive for weakness.   Psychiatric/Behavioral: Negative for confusion.     Objective:     Vital Signs (Most Recent):  Temp: 97.2 °F (36.2 °C) (11/20/20 0711)  Pulse: 69 (11/20/20 0711)  Resp: 17 (11/20/20 0711)  BP: (!) 142/64 (11/20/20 0711)  SpO2: 97 % (11/20/20 0711) Vital Signs (24h Range):  Temp:  [97.2 °F (36.2 °C)-98.3 °F (36.8 °C)] 97.2 °F (36.2 °C)  Pulse:  [67-80] 69  Resp:  [16-18] 17  SpO2:  [95 %-98 %] 97 %  BP: (124-146)/(59-65) 142/64     Weight: 127.3 kg (280 lb 10.3 oz)  Body mass index is 40.27 kg/m².    Intake/Output Summary (Last 24 hours) at 11/20/2020 0753  Last data filed at 11/19/2020 1500  Gross per 24 hour   Intake --   Output 700 ml   Net -700 ml      Physical Exam  Vitals signs and nursing note reviewed.   Constitutional:       General: She is not in acute distress.     Appearance: She is well-developed. She is obese.   HENT:      Head: Normocephalic and atraumatic.      Nose: Nose normal.      Mouth/Throat:      Mouth: Mucous membranes are moist.      Pharynx: Oropharynx is clear.   Eyes:      Extraocular Movements: Extraocular movements intact.      " Conjunctiva/sclera: Conjunctivae normal.      Pupils: Pupils are equal, round, and reactive to light.   Neck:      Musculoskeletal: Neck supple.      Thyroid: No thyromegaly.   Cardiovascular:      Rate and Rhythm: Normal rate and regular rhythm.      Pulses: Normal pulses.      Heart sounds: No murmur. No friction rub. No gallop.    Pulmonary:      Effort: Pulmonary effort is normal. No respiratory distress.      Breath sounds: No wheezing, rhonchi or rales.   Abdominal:      General: Bowel sounds are normal.      Palpations: Abdomen is soft.   Musculoskeletal:      Right lower leg: No edema.      Left lower leg: No edema.   Lymphadenopathy:      Cervical: No cervical adenopathy.   Skin:     General: Skin is warm and dry.   Neurological:      General: No focal deficit present.      Mental Status: She is alert.      Cranial Nerves: No cranial nerve deficit.      Motor: Weakness present.      Gait: Gait abnormal.      Comments: Obese, not very cooperative with PT due to pain.  Max assistance   Psychiatric:         Mood and Affect: Mood normal.         Behavior: Behavior normal.         Significant Labs: procal 0.04  BMP:   Recent Labs   Lab 11/19/20  0547   GLU 92      K 4.1   CL 98   CO2 31*   BUN 14   CREATININE 1.0   CALCIUM 10.3   MG 2.3   phos 3.7  Mag 1.9  Lab Results   Component Value Date    WBC 5.86 11/19/2020    HGB 10.2 (L) 11/19/2020    HCT 34.0 (L) 11/19/2020    MCV 89 11/19/2020     11/19/2020 11/9 vanc trough 16.5    MRI thoracic spine   1. Altered signal intensity changes at T9/T10 with evidence of erosive focus about the opposing endplates and evidence of enhancing phlegmonous formation identified in the central component of the disc with central low signal intensity compatible with osteomyelitis/discitis.  Vertical dimensions of the area of interest cause extreme erosion along the anterior portion of the vertebral bodies of interest without evidence of any significant paraspinal  component.  2. No significant soft tissue component.  3. Chronic degenerative spondylosis changes of the remaining segments of the thoracic spine.  4. Chronic degenerative changes of the entirety of the cervical spine.      Assessment/Plan:      * Debility  Was walking with walker prior to pneumonia/bacteremia admit last month then after d.c was only w/c bound (gonzalez round) will add pt here and try and get her as strong as poss as she lives at home with family.   10/23 Chronic co back ache but also has recent high ankle fx ; per EDDIE Vinson NP Spoke with Ariela VASQUES who will see patient while on SWING. She looked over x rays and hx and reports that patient can weight bear as tolerated. Nurse/OT/MD notified    · 10/26 Supine to Sit: maximal assistance, of 2 persons and patient able to reach with UE to push through rail and with improved push off with UE and initiation  · Sit to Supine: maximal assistance of 2 people with improved ability of patient to control descent of upper body  · Transfers:     · Sit to Stand:  moderate assistance, maximal assistance of 2 persons with no AD.  PT and PT tech blocking both knees while assisting pt to elevate hips from bed  Balance: pt able to stand x15 seconds EOB limited largely by knee and back pain.     10/28.  · Bed Mobility:     · Rolling Left:  moderate assistance  · Rolling Right: moderate assistance  · Scooting: maximal assistance  · Supine to Sit: maximum/moderate assistace and cues  · Sit to Supine: maximum assistace x 1-2 and cues  · Transfers:     · Sit to Stand:  maximum assistance and cues inside the parallel bars with facilitation tech  · Bed to Chair: to wheelchair  with  maximum assistance and cues  using  Slide Board  · Balance: Standing Static inside the parallel bars with Poor grade. Tolerated for ~10 seconds with 2 attempts with  Maximum assistance and cues.  10/30 Standing with RW Static: Poor for ~15 seconds tolerance with max Assistance and cues  11/2  " wheelchair maximum assistance x 2 and cues  with  slide board  using  slide/scoot tech  11/4 Sit to Stand:  Max/moderate assistance and cues with standard walker  Balance: Static Stand with Std Walker: Fair- with 2 mins and 7 seconds  tolerance and max/moderate assistance and cues  · 11/6 max/moderate assistance and cues  with standard walker  Balance: Standing Static with std walker: Poor+ for 1 minute and 15 seconds(1st attempt); 45 seconds(2nd attempt)    11/9 she is becoming stronger  · Cont with PT  · Rolling Left:  stand by assistance  · Rolling Right: stand by assistance  · Scooting: stand by assistance  · Supine to Sit: contact guard assistance  · Sit to Supine: minimum assistance  · Transfers:     Sit to Stand:  moderate assistance with rolling walker.3    11/13/20 Transfers:     · Sit to Stand:  Moderate assistance and cues with standard walker  Balance: Standing with Std Walker Static: Poor+ for  1 minute and  20 seconds(1st attempt); 35 seconds(2nd attempt). woth Moderate Assistance and cues    · 11/16 Scooting: minimum assistance  · Bridging: minimum assistance  · Supine to Sit: minimum assistance  · Sit to Supine: moderate assistance  · Transfers:     · Sit to Stand:  minimum assistance and moderate assistance with standard walker  Balance: Modified independent with static sitting at edge of bed, min assist with static standing with SW     11/18 - Sit to Stand:  moderate assiotance and cues with standard walker  · Balance: Standing with std walker Static: Fair for ~50 seconds tolerance(1st attempt) ; ~10 seconds tolerance(2nd attempt)  11/19 pt was able stand up and transfer  to Bedside commode for the first time today      Cough productive of purulent sputum  Productive cough this am per nurse- " pale green, grey"   Add mucinex and give neb tx q 6 while awake  No fever, WBC normal   Increase activity ..  Laying flat today. No complaints SOB./cough.  On 2L NC pox 92-94%      Diarrhea  Stools d/c as " diarrhea has improved. + heme occult . lovenox stopped cbc stable     11/20: patient still with significant immobility. H/H stable and no gross bleeding. Will resume lovenox.     Malnutrition of mild degree  Dietary.  Boost.      Hydronephrosis  Seen on CT 10/7 - u/a was negative .  Urinating without issue  No flank pain  No signs of infection  No hematuria      CAD (coronary artery disease)  Cont asa, plavix, lasix, isosorbide, lisinopril, toprol and statin.      HTN (hypertension)  Increase lisinopril 2.5>10mg daily  10/23 SBP 160s at times; lisinopril just increased will give more time for lisinopril to work before titration  10/26 SBP 140s; cont to monitor  10/28 /80- increase lisinopril  10/30 BP much better 119/59- 138/62  .  11/2 /58.  VSS   11/9 /56- well controlled (cont lisinopril, lasix, isosorbide, metoprolol).    Discitis  vanc 1450mg IV every 24hr x 8 weeks total till 12/3 per ID ; will stay here for the duration   PT  vanc now 1000mg IV every 24hr till 12/3 per ID last vanc trough 10/31 15.8    Reach out to ns today for guidance on re-imaging  Noted per ID Plan for 6 - 8 weeks of IV antibiotics with repeat MRI at midpoint of treatment as no surgical drainage undertaken.  - ID will follow.  She is currently midpoint; will plan for MRI of thoracic on Monday .  Cont vanc now 750mg IV daily with trough 11/7 16.3 .  11/13 remains max assist; can stand for 1m 20sec; awaiting ID assessment of MRI    11/11 contacted neurosurgery for repeat MRI review. She is only able to stand at bedside with therapy for 1min and 15 sec with mod to max assist. She would be a difficult transfer at this point.   11/16 will repeat message to neurosurgery as well as ID to eval patient maybe virtual visit. She would be a very difficult transfer for in person visit  11/18 no response from ID ; pt set up for [facility]YANNA for OP appnt with ID today   11/20 Still waiting on ID recs, multiple messages sent to neurosurgery;  Georgina Morrison and Luis E Calabrese as well as ID; Neftali Rm. We are trying to follow discharge plan from acute stay with repeat MRI and ID follow up and awaiting recommendations. Cont current treatment pending recs    Severe obesity (BMI >= 40)  Using boost as she has low appetite and low protein       Obstructive sleep apnea treated with continuous positive airway pressure (CPAP)  continue home cpap      Dementia without behavioral disturbance  Cont namenda and aricept .      VTE Risk Mitigation (From admission, onward)         Ordered     enoxaparin injection 40 mg  Every 24 hours      11/20/20 1107                Discharge Planning   BRIT: 12/3/2020     Code Status: Prior   Is the patient medically ready for discharge?:     Reason for patient still in hospital (select all that apply): Treatment  Discharge Plan A: Home with family, Home Health                  Layne Abbott MD  Department of Hospital Medicine   [facility]

## 2020-11-21 LAB
ANION GAP SERPL CALC-SCNC: 10 MMOL/L (ref 8–16)
BUN SERPL-MCNC: 15 MG/DL (ref 8–23)
CALCIUM SERPL-MCNC: 10.5 MG/DL (ref 8.7–10.5)
CHLORIDE SERPL-SCNC: 97 MMOL/L (ref 95–110)
CO2 SERPL-SCNC: 29 MMOL/L (ref 23–29)
CREAT SERPL-MCNC: 1.2 MG/DL (ref 0.5–1.4)
EST. GFR  (AFRICAN AMERICAN): 52 ML/MIN/1.73 M^2
EST. GFR  (NON AFRICAN AMERICAN): 45 ML/MIN/1.73 M^2
GLUCOSE SERPL-MCNC: 103 MG/DL (ref 70–110)
POTASSIUM SERPL-SCNC: 4.3 MMOL/L (ref 3.5–5.1)
SODIUM SERPL-SCNC: 136 MMOL/L (ref 136–145)
VANCOMYCIN TROUGH SERPL-MCNC: 17.4 UG/ML (ref 10–22)

## 2020-11-21 PROCEDURE — 94799 UNLISTED PULMONARY SVC/PX: CPT

## 2020-11-21 PROCEDURE — 25000003 PHARM REV CODE 250: Performed by: FAMILY MEDICINE

## 2020-11-21 PROCEDURE — 36415 COLL VENOUS BLD VENIPUNCTURE: CPT

## 2020-11-21 PROCEDURE — 27000221 HC OXYGEN, UP TO 24 HOURS

## 2020-11-21 PROCEDURE — 25000242 PHARM REV CODE 250 ALT 637 W/ HCPCS: Performed by: NURSE PRACTITIONER

## 2020-11-21 PROCEDURE — 97530 THERAPEUTIC ACTIVITIES: CPT

## 2020-11-21 PROCEDURE — 63600175 PHARM REV CODE 636 W HCPCS: Performed by: INTERNAL MEDICINE

## 2020-11-21 PROCEDURE — 94640 AIRWAY INHALATION TREATMENT: CPT

## 2020-11-21 PROCEDURE — 25000003 PHARM REV CODE 250: Performed by: NURSE PRACTITIONER

## 2020-11-21 PROCEDURE — A4216 STERILE WATER/SALINE, 10 ML: HCPCS | Performed by: NURSE PRACTITIONER

## 2020-11-21 PROCEDURE — 80048 BASIC METABOLIC PNL TOTAL CA: CPT

## 2020-11-21 PROCEDURE — 11000004 HC SNF PRIVATE

## 2020-11-21 PROCEDURE — 94761 N-INVAS EAR/PLS OXIMETRY MLT: CPT

## 2020-11-21 PROCEDURE — 80202 ASSAY OF VANCOMYCIN: CPT

## 2020-11-21 PROCEDURE — 63600175 PHARM REV CODE 636 W HCPCS: Performed by: FAMILY MEDICINE

## 2020-11-21 RX ORDER — ENOXAPARIN SODIUM 100 MG/ML
40 INJECTION SUBCUTANEOUS EVERY 12 HOURS
Status: DISCONTINUED | OUTPATIENT
Start: 2020-11-21 | End: 2020-11-28

## 2020-11-21 RX ADMIN — IPRATROPIUM BROMIDE AND ALBUTEROL SULFATE 3 ML: .5; 3 SOLUTION RESPIRATORY (INHALATION) at 01:11

## 2020-11-21 RX ADMIN — ASPIRIN 81 MG: 81 TABLET, COATED ORAL at 09:11

## 2020-11-21 RX ADMIN — Medication 10 ML: at 05:11

## 2020-11-21 RX ADMIN — MEMANTINE 10 MG: 10 TABLET ORAL at 09:11

## 2020-11-21 RX ADMIN — FERROUS SULFATE TAB 325 MG (65 MG ELEMENTAL FE) 325 MG: 325 (65 FE) TAB at 08:11

## 2020-11-21 RX ADMIN — MICONAZOLE NITRATE: 20 OINTMENT TOPICAL at 09:11

## 2020-11-21 RX ADMIN — DONEPEZIL HYDROCHLORIDE 10 MG: 5 TABLET, FILM COATED ORAL at 08:11

## 2020-11-21 RX ADMIN — IPRATROPIUM BROMIDE AND ALBUTEROL SULFATE 3 ML: .5; 3 SOLUTION RESPIRATORY (INHALATION) at 08:11

## 2020-11-21 RX ADMIN — ACETAMINOPHEN 500 MG: 500 TABLET, FILM COATED ORAL at 03:11

## 2020-11-21 RX ADMIN — VANCOMYCIN HYDROCHLORIDE 750 MG: 750 INJECTION, POWDER, LYOPHILIZED, FOR SOLUTION INTRAVENOUS at 08:11

## 2020-11-21 RX ADMIN — DULOXETINE 60 MG: 30 CAPSULE, DELAYED RELEASE ORAL at 09:11

## 2020-11-21 RX ADMIN — METHOCARBAMOL TABLETS 500 MG: 500 TABLET, COATED ORAL at 08:11

## 2020-11-21 RX ADMIN — PRAVASTATIN SODIUM 40 MG: 40 TABLET ORAL at 08:11

## 2020-11-21 RX ADMIN — IPRATROPIUM BROMIDE AND ALBUTEROL SULFATE 3 ML: .5; 3 SOLUTION RESPIRATORY (INHALATION) at 07:11

## 2020-11-21 RX ADMIN — ENOXAPARIN SODIUM 40 MG: 40 INJECTION SUBCUTANEOUS at 08:11

## 2020-11-21 RX ADMIN — LISINOPRIL 10 MG: 10 TABLET ORAL at 09:11

## 2020-11-21 RX ADMIN — METHOCARBAMOL TABLETS 500 MG: 500 TABLET, COATED ORAL at 05:11

## 2020-11-21 RX ADMIN — GUAIFENESIN 600 MG: 600 TABLET, EXTENDED RELEASE ORAL at 08:11

## 2020-11-21 RX ADMIN — ONDANSETRON 4 MG: 4 TABLET, ORALLY DISINTEGRATING ORAL at 03:11

## 2020-11-21 RX ADMIN — ISOSORBIDE MONONITRATE 60 MG: 60 TABLET, EXTENDED RELEASE ORAL at 09:11

## 2020-11-21 RX ADMIN — METHOCARBAMOL TABLETS 500 MG: 500 TABLET, COATED ORAL at 09:11

## 2020-11-21 RX ADMIN — PANTOPRAZOLE SODIUM 40 MG: 40 TABLET, DELAYED RELEASE ORAL at 05:11

## 2020-11-21 RX ADMIN — ACETAMINOPHEN 500 MG: 500 TABLET, FILM COATED ORAL at 08:11

## 2020-11-21 RX ADMIN — CLOPIDOGREL 75 MG: 75 TABLET, FILM COATED ORAL at 09:11

## 2020-11-21 RX ADMIN — MEMANTINE 10 MG: 10 TABLET ORAL at 08:11

## 2020-11-21 RX ADMIN — GUAIFENESIN 600 MG: 600 TABLET, EXTENDED RELEASE ORAL at 09:11

## 2020-11-21 RX ADMIN — FUROSEMIDE 40 MG: 40 TABLET ORAL at 09:11

## 2020-11-21 RX ADMIN — METHOCARBAMOL TABLETS 500 MG: 500 TABLET, COATED ORAL at 11:11

## 2020-11-21 RX ADMIN — ACETAMINOPHEN 500 MG: 500 TABLET, FILM COATED ORAL at 09:11

## 2020-11-21 RX ADMIN — METOPROLOL SUCCINATE 50 MG: 50 TABLET, EXTENDED RELEASE ORAL at 09:11

## 2020-11-21 RX ADMIN — Medication 10 ML: at 11:11

## 2020-11-21 RX ADMIN — HYDROCODONE BITARTRATE AND ACETAMINOPHEN 1 TABLET: 10; 325 TABLET ORAL at 11:11

## 2020-11-21 RX ADMIN — HYDROCODONE BITARTRATE AND ACETAMINOPHEN 1 TABLET: 10; 325 TABLET ORAL at 05:11

## 2020-11-21 RX ADMIN — MICONAZOLE NITRATE: 20 OINTMENT TOPICAL at 08:11

## 2020-11-21 NOTE — PLAN OF CARE
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  Following aspiration biopsy, admitted here for long-term antibiotics. Patient on Vancomycin daily.  Trough scheduled to be drawn tonight at 7pm. Patient had follow up appointment with infectious diseases on 11/19, patient will have virtual visits in future, awaiting plan from [facility].  Lovenox restarted for VTE prevention.   Right brachial PICC line, double lumen.  Red port flushes with blood return, purple lumen clotted.  Dressing change due 11/23.   Patient working with PT/OT, working on sitting on edge of bed and standing for short periods to assist with wheelchair transfer upon discharge.   Pain controlled with PRN Norco, 10mg.

## 2020-11-21 NOTE — PT/OT/SLP PROGRESS
"Physical Therapy Treatment    Patient Name:  Martina Betancourt   MRN:  [id number]    Recommendations:     Discharge Recommendations:  nursing facility, basic   Discharge Equipment Recommendations: hospital bed   Barriers to discharge: None    Assessment:     Martina Betancourt is a 70 y.o. female admitted with a medical diagnosis of Debility.  She presents with the following impairments/functional limitations:  weakness, impaired endurance, impaired self care skills, impaired functional mobilty, gait instability, impaired balance, decreased lower extremity function, pain, decreased ROM. Patient required less assistance in transferring from Supine to sitting EOB today, only required CGA x1. Patient refused attempting TF to Chair or to stand due to increased Back pain. Therefore, focussed session on therx in sitting position which she tolerated fairly well.    Rehab Prognosis: Fair; patient would benefit from acute skilled PT services to address these deficits and reach maximum level of function.    Recent Surgery: * No surgery found *      Plan:     During this hospitalization, patient to be seen daily to address the identified rehab impairments via gait training, therapeutic activities, therapeutic exercises, wheelchair management/training and progress toward the following goals:    · Plan of Care Expires:  11/27/20    Subjective     Chief Complaint: "My back is hurting today."  Patient/Family Comments/goals: "to get the back pain to stop."  Pain/Comfort:  · Pain Rating 1: 8/10  · Location - Side 1: Bilateral  · Location - Orientation 1: lower  · Location 1: back  · Pain Addressed 1: Reposition  · Pain Rating Post-Intervention 1: 8/10      Objective:     Communicated with Patient prior to session.  Patient found HOB elevated with peripheral IV, oxygen, telemetry upon PT entry to room.     General Precautions: Standard, fall   Orthopedic Precautions:N/A   Braces: N/A     Functional Mobility:  · Bed Mobility:     · Rolling " Right: modified independence  · Bridging: modified independence  · Supine to Sit: contact guard assistance  · Sit to Supine: contact guard assistance  · Balance: sitting Fair+      AM-PAC 6 CLICK MOBILITY  Turning over in bed (including adjusting bedclothes, sheets and blankets)?: 3  Sitting down on and standing up from a chair with arms (e.g., wheelchair, bedside commode, etc.): 2  Moving from lying on back to sitting on the side of the bed?: 3  Moving to and from a bed to a chair (including a wheelchair)?: 2  Need to walk in hospital room?: 1  Climbing 3-5 steps with a railing?: 1  Basic Mobility Total Score: 12       Therapeutic Activities and Exercises:   Patient completed:  Ankle Pumps  LAQS  Seated marches  Hip Abd/Add     Patient left HOB elevated with all lines intact and call button in reach..    GOALS:   Multidisciplinary Problems     Physical Therapy Goals        Problem: Physical Therapy Goal    Goal Priority Disciplines Outcome Goal Variances Interventions   Physical Therapy Goal     PT, PT/OT Ongoing, Progressing     Description: Goals to be met by: 11/10/20    Patient will increase functional independence with mobility by performin. Rolling to sides using bed rail with contact guard assistance and cues  2. Supine to sit with minimal assistance and cues  3. Sit to supine with moderate assistance and cues  4. Tolerate Static Sit at side of the bed for 10 minutes with Standby Assistance  5. Bed to chair transfer with moderate assistance and cues using stand step TECHNIQUE  6. Lower extremity exercise program x15 reps per handout, with assistance as needed                      Time Tracking:     PT Received On: 20  PT Start Time: 1000     PT Stop Time: 1020  PT Total Time (min): 20 min     Billable Minutes: Therapeutic Activity 20 minutes    Treatment Type: Treatment  PT/PTA: PT     PTA Visit Number: 0     Tha Grady, PT  2020

## 2020-11-22 LAB
ANION GAP SERPL CALC-SCNC: 10 MMOL/L (ref 8–16)
BUN SERPL-MCNC: 15 MG/DL (ref 8–23)
CALCIUM SERPL-MCNC: 10.3 MG/DL (ref 8.7–10.5)
CHLORIDE SERPL-SCNC: 100 MMOL/L (ref 95–110)
CO2 SERPL-SCNC: 29 MMOL/L (ref 23–29)
CREAT SERPL-MCNC: 1.1 MG/DL (ref 0.5–1.4)
EST. GFR  (AFRICAN AMERICAN): 58 ML/MIN/1.73 M^2
EST. GFR  (NON AFRICAN AMERICAN): 50 ML/MIN/1.73 M^2
GLUCOSE SERPL-MCNC: 89 MG/DL (ref 70–110)
POTASSIUM SERPL-SCNC: 4.2 MMOL/L (ref 3.5–5.1)
SODIUM SERPL-SCNC: 139 MMOL/L (ref 136–145)

## 2020-11-22 PROCEDURE — A4216 STERILE WATER/SALINE, 10 ML: HCPCS | Performed by: NURSE PRACTITIONER

## 2020-11-22 PROCEDURE — 25000003 PHARM REV CODE 250: Performed by: FAMILY MEDICINE

## 2020-11-22 PROCEDURE — 94761 N-INVAS EAR/PLS OXIMETRY MLT: CPT

## 2020-11-22 PROCEDURE — 80048 BASIC METABOLIC PNL TOTAL CA: CPT

## 2020-11-22 PROCEDURE — 25000003 PHARM REV CODE 250: Performed by: NURSE PRACTITIONER

## 2020-11-22 PROCEDURE — 11000004 HC SNF PRIVATE

## 2020-11-22 PROCEDURE — 27000221 HC OXYGEN, UP TO 24 HOURS

## 2020-11-22 PROCEDURE — 97530 THERAPEUTIC ACTIVITIES: CPT

## 2020-11-22 PROCEDURE — 94799 UNLISTED PULMONARY SVC/PX: CPT

## 2020-11-22 PROCEDURE — 25000242 PHARM REV CODE 250 ALT 637 W/ HCPCS: Performed by: NURSE PRACTITIONER

## 2020-11-22 PROCEDURE — 94640 AIRWAY INHALATION TREATMENT: CPT

## 2020-11-22 PROCEDURE — 63600175 PHARM REV CODE 636 W HCPCS: Performed by: FAMILY MEDICINE

## 2020-11-22 PROCEDURE — 36415 COLL VENOUS BLD VENIPUNCTURE: CPT

## 2020-11-22 PROCEDURE — 63600175 PHARM REV CODE 636 W HCPCS: Performed by: INTERNAL MEDICINE

## 2020-11-22 RX ADMIN — LISINOPRIL 10 MG: 10 TABLET ORAL at 09:11

## 2020-11-22 RX ADMIN — METHOCARBAMOL TABLETS 500 MG: 500 TABLET, COATED ORAL at 04:11

## 2020-11-22 RX ADMIN — MICONAZOLE NITRATE: 20 OINTMENT TOPICAL at 09:11

## 2020-11-22 RX ADMIN — ASPIRIN 81 MG: 81 TABLET, COATED ORAL at 09:11

## 2020-11-22 RX ADMIN — FERROUS SULFATE TAB 325 MG (65 MG ELEMENTAL FE) 325 MG: 325 (65 FE) TAB at 08:11

## 2020-11-22 RX ADMIN — IPRATROPIUM BROMIDE AND ALBUTEROL SULFATE 3 ML: .5; 3 SOLUTION RESPIRATORY (INHALATION) at 01:11

## 2020-11-22 RX ADMIN — Medication 10 ML: at 06:11

## 2020-11-22 RX ADMIN — MEMANTINE 10 MG: 10 TABLET ORAL at 08:11

## 2020-11-22 RX ADMIN — GUAIFENESIN 600 MG: 600 TABLET, EXTENDED RELEASE ORAL at 08:11

## 2020-11-22 RX ADMIN — METHOCARBAMOL TABLETS 500 MG: 500 TABLET, COATED ORAL at 12:11

## 2020-11-22 RX ADMIN — IPRATROPIUM BROMIDE AND ALBUTEROL SULFATE 3 ML: .5; 3 SOLUTION RESPIRATORY (INHALATION) at 07:11

## 2020-11-22 RX ADMIN — ISOSORBIDE MONONITRATE 60 MG: 60 TABLET, EXTENDED RELEASE ORAL at 09:11

## 2020-11-22 RX ADMIN — PANTOPRAZOLE SODIUM 40 MG: 40 TABLET, DELAYED RELEASE ORAL at 05:11

## 2020-11-22 RX ADMIN — Medication 10 ML: at 05:11

## 2020-11-22 RX ADMIN — HYDROCODONE BITARTRATE AND ACETAMINOPHEN 1 TABLET: 10; 325 TABLET ORAL at 09:11

## 2020-11-22 RX ADMIN — DULOXETINE 60 MG: 30 CAPSULE, DELAYED RELEASE ORAL at 09:11

## 2020-11-22 RX ADMIN — MICONAZOLE NITRATE: 20 OINTMENT TOPICAL at 08:11

## 2020-11-22 RX ADMIN — ACETAMINOPHEN 500 MG: 500 TABLET, FILM COATED ORAL at 08:11

## 2020-11-22 RX ADMIN — METOPROLOL SUCCINATE 50 MG: 50 TABLET, EXTENDED RELEASE ORAL at 09:11

## 2020-11-22 RX ADMIN — CLOPIDOGREL 75 MG: 75 TABLET, FILM COATED ORAL at 09:11

## 2020-11-22 RX ADMIN — ACETAMINOPHEN 500 MG: 500 TABLET, FILM COATED ORAL at 03:11

## 2020-11-22 RX ADMIN — IPRATROPIUM BROMIDE AND ALBUTEROL SULFATE 3 ML: .5; 3 SOLUTION RESPIRATORY (INHALATION) at 08:11

## 2020-11-22 RX ADMIN — METHOCARBAMOL TABLETS 500 MG: 500 TABLET, COATED ORAL at 09:11

## 2020-11-22 RX ADMIN — MEMANTINE 10 MG: 10 TABLET ORAL at 09:11

## 2020-11-22 RX ADMIN — Medication 10 ML: at 12:11

## 2020-11-22 RX ADMIN — Medication 10 ML: at 11:11

## 2020-11-22 RX ADMIN — METHOCARBAMOL TABLETS 500 MG: 500 TABLET, COATED ORAL at 08:11

## 2020-11-22 RX ADMIN — VANCOMYCIN HYDROCHLORIDE 750 MG: 750 INJECTION, POWDER, LYOPHILIZED, FOR SOLUTION INTRAVENOUS at 08:11

## 2020-11-22 RX ADMIN — FUROSEMIDE 40 MG: 40 TABLET ORAL at 09:11

## 2020-11-22 RX ADMIN — DONEPEZIL HYDROCHLORIDE 10 MG: 5 TABLET, FILM COATED ORAL at 08:11

## 2020-11-22 RX ADMIN — ENOXAPARIN SODIUM 40 MG: 40 INJECTION SUBCUTANEOUS at 08:11

## 2020-11-22 RX ADMIN — ENOXAPARIN SODIUM 40 MG: 40 INJECTION SUBCUTANEOUS at 09:11

## 2020-11-22 RX ADMIN — PRAVASTATIN SODIUM 40 MG: 40 TABLET ORAL at 08:11

## 2020-11-22 RX ADMIN — GUAIFENESIN 600 MG: 600 TABLET, EXTENDED RELEASE ORAL at 09:11

## 2020-11-22 NOTE — PLAN OF CARE
Pt remains on swing for continued IV antibiotics for spinal abscesses. PICC line to right upper arm. Red port flushes easily; purple port clotted. Afebrile. WBC negative. Vitals stable. Maintaining oxygen sats low to mid 90s on 2 liters nasal cannula. PT and OT consulted. External catheter in use. Occasional complaints of back pain; relieved with scheduled and prn medications. Remains free from injury/falls. Reviewed plan of care with pt; states agreement.

## 2020-11-22 NOTE — PROGRESS NOTES
Pharmacokinetic Assessment Follow Up: IV Vancomycin    Vancomycin serum concentration assessment(s):    The trough level was drawn correctly and can be used to guide therapy at this time. The measurement is within the desired definitive target range of 15 to 20 mcg/mL.    Vancomycin Regimen Plan:    Continue regimen to Vancomycin 750 mg IV every 24 hours with next serum trough concentration measured at 1900 prior to 3rd dose on 11-24-20    Drug levels (last 3 results):  Recent Labs   Lab Result Units 11/19/20  1257 11/21/20  1911   Vancomycin-Trough ug/mL 19.2 17.4       Pharmacy will continue to follow and monitor vancomycin.    Please contact pharmacy at extension [phone] for questions regarding this assessment.    Thank you for the consult,   Efrem Em       Patient brief summary:  Martina Betancourt is a 70 y.o. female initiated on antimicrobial therapy with IV Vancomycin for treatment of bone/joint infection        Drug Allergies:   Review of patient's allergies indicates:   Allergen Reactions    Hydroxyzine hcl     Tizanidine        Actual Body Weight:   128.5 kg    Renal Function:   Estimated Creatinine Clearance: 61.9 mL/min (based on SCr of 1.2 mg/dL).,     Dialysis Method (if applicable):  N/A    CBC (last 72 hours):  Recent Labs   Lab Result Units 11/19/20  0547   WBC K/uL 5.86   Hemoglobin g/dL 10.2*   Hematocrit % 34.0*   Platelets K/uL 340   Gran % % 52.4   Lymph % % 30.9   Mono % % 12.1   Eosinophil % % 3.6   Basophil % % 0.7   Differential Method  Automated       Metabolic Panel (last 72 hours):  Recent Labs   Lab Result Units 11/19/20  0547 11/20/20  0539 11/21/20  0557   Sodium mmol/L 139 138 136   Potassium mmol/L 4.1 4.0 4.3   Chloride mmol/L 98 98 97   CO2 mmol/L 31* 30* 29   Glucose mg/dL 92 87 103   BUN mg/dL 14 14 15   Creatinine mg/dL 1.0 1.2 1.2   Magnesium mg/dL 2.3  --   --    Phosphorus mg/dL 3.2  --   --        Vancomycin Administrations:  vancomycin given in the last 96 hours                      vancomycin 750 mg in dextrose 5 % 250 mL IVPB (ready to mix system) (mg) 750 mg New Bag 11/21/20 2004     750 mg New Bag 11/20/20 2023     750 mg New Bag 11/19/20 1932    vancomycin 750 mg in dextrose 5 % 250 mL IVPB (ready to mix system) (mg) 750 mg New Bag 11/18/20 2006     750 mg New Bag  0158                    Microbiologic Results:  Microbiology Results (last 7 days)       ** No results found for the last 168 hours. **

## 2020-11-22 NOTE — PT/OT/SLP PROGRESS
"Physical Therapy Treatment    Patient Name:  Martina Betancourt   MRN:  [id number]    Recommendations:     Discharge Recommendations:  nursing facility, basic   Discharge Equipment Recommendations: hospital bed   Barriers to discharge: None    Assessment:     Martina Betancourt is a 70 y.o. female admitted with a medical diagnosis of Debility.  She presents with the following impairments/functional limitations:  weakness, impaired endurance, impaired self care skills, impaired functional mobilty, gait instability, impaired balance, decreased lower extremity function, pain, decreased ROM. Patient with improved tolerance of PT session today. Patient with improved bed mobility and transfers compared to previous visit. Patient still appears to be limited by back pain and weakness of BLE. She was able to transfer to standing today with RW and only Moderate assistance, however, quickly sat to back pain.    Rehab Prognosis: Fair; patient would benefit from acute skilled PT services to address these deficits and reach maximum level of function.    Recent Surgery: * No surgery found *      Plan:     During this hospitalization, patient to be seen daily to address the identified rehab impairments via gait training, therapeutic activities, therapeutic exercises and progress toward the following goals:    · Plan of Care Expires:  11/27/20    Subjective     Chief Complaint: "Back Pain"  Patient/Family Comments/goals: "to help the back pain a feel better."  Pain/Comfort:  · Pain Rating 1: 6/10  · Location - Side 1: Bilateral  · Location - Orientation 1: lower  · Location 1: back  · Pain Addressed 1: Reposition  · Pain Rating Post-Intervention 1: 6/10      Objective:     Communicated with patient prior to session.  Patient found HOB elevated with peripheral IV, oxygen, telemetry upon PT entry to room.     General Precautions: Standard, fall   Orthopedic Precautions:N/A   Braces: N/A     Functional Mobility:  · Bed Mobility:     · Rolling " Right: modified independence  · Scooting: moderate assistance  · Bridging: contact guard assistance  · Supine to Sit: contact guard assistance  · Sit to Supine: minimum assistance  · Transfers:     · Sit to Stand:  minimum assistance with rolling walker  · Balance: sitting good, standing Poor      AM-PAC 6 CLICK MOBILITY  Turning over in bed (including adjusting bedclothes, sheets and blankets)?: 3  Sitting down on and standing up from a chair with arms (e.g., wheelchair, bedside commode, etc.): 2  Moving from lying on back to sitting on the side of the bed?: 3  Moving to and from a bed to a chair (including a wheelchair)?: 2  Need to walk in hospital room?: 1  Climbing 3-5 steps with a railing?: 1  Basic Mobility Total Score: 12       Therapeutic Activities and Exercises:   Patient completed the follow exercises while positioned EOB  Ankle Pumps  LAQS  Seated Marches  Hip ABD/ADD    Patient left HOB elevated with all lines intact and call button in reach..    GOALS:   Multidisciplinary Problems     Physical Therapy Goals        Problem: Physical Therapy Goal    Goal Priority Disciplines Outcome Goal Variances Interventions   Physical Therapy Goal     PT, PT/OT Ongoing, Progressing     Description: Goals to be met by: 11/10/20    Patient will increase functional independence with mobility by performin. Rolling to sides using bed rail with contact guard assistance and cues  2. Supine to sit with minimal assistance and cues  3. Sit to supine with moderate assistance and cues  4. Tolerate Static Sit at side of the bed for 10 minutes with Standby Assistance  5. Bed to chair transfer with moderate assistance and cues using stand step TECHNIQUE  6. Lower extremity exercise program x15 reps per handout, with assistance as needed                      Time Tracking:     PT Received On: 20  PT Start Time: 1015     PT Stop Time: 1030  PT Total Time (min): 15 min     Billable Minutes: Therapeutic Activity 15  minutes    Treatment Type: Treatment  PT/PTA: PT     PTA Visit Number: 0     Tha Grady, PT  11/22/2020

## 2020-11-23 ENCOUNTER — OFFICE VISIT (OUTPATIENT)
Dept: NEUROSURGERY | Facility: CLINIC | Age: 70
End: 2020-11-23
Payer: OTHER GOVERNMENT

## 2020-11-23 DIAGNOSIS — M46.44 DISCITIS OF THORACIC REGION: Primary | ICD-10-CM

## 2020-11-23 DIAGNOSIS — M54.9 BACK PAIN, UNSPECIFIED BACK LOCATION, UNSPECIFIED BACK PAIN LATERALITY, UNSPECIFIED CHRONICITY: ICD-10-CM

## 2020-11-23 LAB
ANION GAP SERPL CALC-SCNC: 9 MMOL/L (ref 8–16)
BASOPHILS # BLD AUTO: 0.03 K/UL (ref 0–0.2)
BASOPHILS NFR BLD: 0.5 % (ref 0–1.9)
BUN SERPL-MCNC: 14 MG/DL (ref 8–23)
CALCIUM SERPL-MCNC: 10.1 MG/DL (ref 8.7–10.5)
CHLORIDE SERPL-SCNC: 99 MMOL/L (ref 95–110)
CO2 SERPL-SCNC: 31 MMOL/L (ref 23–29)
CREAT SERPL-MCNC: 1.2 MG/DL (ref 0.5–1.4)
DIFFERENTIAL METHOD: ABNORMAL
EOSINOPHIL # BLD AUTO: 0.3 K/UL (ref 0–0.5)
EOSINOPHIL NFR BLD: 5 % (ref 0–8)
ERYTHROCYTE [DISTWIDTH] IN BLOOD BY AUTOMATED COUNT: 16.5 % (ref 11.5–14.5)
EST. GFR  (AFRICAN AMERICAN): 52 ML/MIN/1.73 M^2
EST. GFR  (NON AFRICAN AMERICAN): 45 ML/MIN/1.73 M^2
GLUCOSE SERPL-MCNC: 85 MG/DL (ref 70–110)
HCT VFR BLD AUTO: 34.4 % (ref 37–48.5)
HGB BLD-MCNC: 10.2 G/DL (ref 12–16)
IMM GRANULOCYTES # BLD AUTO: 0.02 K/UL (ref 0–0.04)
IMM GRANULOCYTES NFR BLD AUTO: 0.3 % (ref 0–0.5)
LYMPHOCYTES # BLD AUTO: 1.9 K/UL (ref 1–4.8)
LYMPHOCYTES NFR BLD: 30.3 % (ref 18–48)
MAGNESIUM SERPL-MCNC: 2.3 MG/DL (ref 1.6–2.6)
MCH RBC QN AUTO: 27.1 PG (ref 27–31)
MCHC RBC AUTO-ENTMCNC: 29.7 G/DL (ref 32–36)
MCV RBC AUTO: 92 FL (ref 82–98)
MONOCYTES # BLD AUTO: 0.7 K/UL (ref 0.3–1)
MONOCYTES NFR BLD: 10.9 % (ref 4–15)
NEUTROPHILS # BLD AUTO: 3.4 K/UL (ref 1.8–7.7)
NEUTROPHILS NFR BLD: 53 % (ref 38–73)
NRBC BLD-RTO: 0 /100 WBC
PHOSPHATE SERPL-MCNC: 3.5 MG/DL (ref 2.7–4.5)
PLATELET # BLD AUTO: 321 K/UL (ref 150–350)
PMV BLD AUTO: 9.8 FL (ref 9.2–12.9)
POTASSIUM SERPL-SCNC: 4.1 MMOL/L (ref 3.5–5.1)
RBC # BLD AUTO: 3.76 M/UL (ref 4–5.4)
SODIUM SERPL-SCNC: 139 MMOL/L (ref 136–145)
WBC # BLD AUTO: 6.4 K/UL (ref 3.9–12.7)

## 2020-11-23 PROCEDURE — 63600175 PHARM REV CODE 636 W HCPCS: Performed by: FAMILY MEDICINE

## 2020-11-23 PROCEDURE — 25000003 PHARM REV CODE 250: Performed by: NURSE PRACTITIONER

## 2020-11-23 PROCEDURE — 80048 BASIC METABOLIC PNL TOTAL CA: CPT

## 2020-11-23 PROCEDURE — 25000242 PHARM REV CODE 250 ALT 637 W/ HCPCS: Performed by: NURSE PRACTITIONER

## 2020-11-23 PROCEDURE — 84100 ASSAY OF PHOSPHORUS: CPT

## 2020-11-23 PROCEDURE — 99213 OFFICE O/P EST LOW 20 MIN: CPT | Mod: 95,,, | Performed by: PHYSICIAN ASSISTANT

## 2020-11-23 PROCEDURE — 99309 PR NURSING FAC CARE, SUBSEQ, SIGNIF COMPLIC: ICD-10-PCS | Mod: ,,, | Performed by: INTERNAL MEDICINE

## 2020-11-23 PROCEDURE — 94799 UNLISTED PULMONARY SVC/PX: CPT

## 2020-11-23 PROCEDURE — 85025 COMPLETE CBC W/AUTO DIFF WBC: CPT

## 2020-11-23 PROCEDURE — A4216 STERILE WATER/SALINE, 10 ML: HCPCS | Performed by: NURSE PRACTITIONER

## 2020-11-23 PROCEDURE — 25000003 PHARM REV CODE 250: Performed by: FAMILY MEDICINE

## 2020-11-23 PROCEDURE — 94761 N-INVAS EAR/PLS OXIMETRY MLT: CPT

## 2020-11-23 PROCEDURE — 97530 THERAPEUTIC ACTIVITIES: CPT

## 2020-11-23 PROCEDURE — 83735 ASSAY OF MAGNESIUM: CPT

## 2020-11-23 PROCEDURE — 11000004 HC SNF PRIVATE

## 2020-11-23 PROCEDURE — 99213 PR OFFICE/OUTPT VISIT, EST, LEVL III, 20-29 MIN: ICD-10-PCS | Mod: 95,,, | Performed by: PHYSICIAN ASSISTANT

## 2020-11-23 PROCEDURE — 99309 SBSQ NF CARE MODERATE MDM 30: CPT | Mod: ,,, | Performed by: INTERNAL MEDICINE

## 2020-11-23 PROCEDURE — 27000221 HC OXYGEN, UP TO 24 HOURS

## 2020-11-23 PROCEDURE — 63600175 PHARM REV CODE 636 W HCPCS: Performed by: INTERNAL MEDICINE

## 2020-11-23 PROCEDURE — 94640 AIRWAY INHALATION TREATMENT: CPT

## 2020-11-23 RX ADMIN — MICONAZOLE NITRATE: 20 OINTMENT TOPICAL at 08:11

## 2020-11-23 RX ADMIN — HYDROCODONE BITARTRATE AND ACETAMINOPHEN 1 TABLET: 10; 325 TABLET ORAL at 01:11

## 2020-11-23 RX ADMIN — FERROUS SULFATE TAB 325 MG (65 MG ELEMENTAL FE) 325 MG: 325 (65 FE) TAB at 08:11

## 2020-11-23 RX ADMIN — ASPIRIN 81 MG: 81 TABLET, COATED ORAL at 07:11

## 2020-11-23 RX ADMIN — VANCOMYCIN HYDROCHLORIDE 750 MG: 750 INJECTION, POWDER, LYOPHILIZED, FOR SOLUTION INTRAVENOUS at 08:11

## 2020-11-23 RX ADMIN — ISOSORBIDE MONONITRATE 60 MG: 60 TABLET, EXTENDED RELEASE ORAL at 07:11

## 2020-11-23 RX ADMIN — ENOXAPARIN SODIUM 40 MG: 40 INJECTION SUBCUTANEOUS at 07:11

## 2020-11-23 RX ADMIN — FUROSEMIDE 40 MG: 40 TABLET ORAL at 07:11

## 2020-11-23 RX ADMIN — Medication 10 ML: at 06:11

## 2020-11-23 RX ADMIN — METHOCARBAMOL TABLETS 500 MG: 500 TABLET, COATED ORAL at 01:11

## 2020-11-23 RX ADMIN — MEMANTINE 10 MG: 10 TABLET ORAL at 07:11

## 2020-11-23 RX ADMIN — GUAIFENESIN 600 MG: 600 TABLET, EXTENDED RELEASE ORAL at 07:11

## 2020-11-23 RX ADMIN — METHOCARBAMOL TABLETS 500 MG: 500 TABLET, COATED ORAL at 07:11

## 2020-11-23 RX ADMIN — HYDROCODONE BITARTRATE AND ACETAMINOPHEN 1 TABLET: 10; 325 TABLET ORAL at 06:11

## 2020-11-23 RX ADMIN — METOPROLOL SUCCINATE 50 MG: 50 TABLET, EXTENDED RELEASE ORAL at 07:11

## 2020-11-23 RX ADMIN — IPRATROPIUM BROMIDE AND ALBUTEROL SULFATE 3 ML: .5; 3 SOLUTION RESPIRATORY (INHALATION) at 07:11

## 2020-11-23 RX ADMIN — LISINOPRIL 10 MG: 10 TABLET ORAL at 07:11

## 2020-11-23 RX ADMIN — CLOPIDOGREL 75 MG: 75 TABLET, FILM COATED ORAL at 07:11

## 2020-11-23 RX ADMIN — ACETAMINOPHEN 500 MG: 500 TABLET, FILM COATED ORAL at 08:11

## 2020-11-23 RX ADMIN — IPRATROPIUM BROMIDE AND ALBUTEROL SULFATE 3 ML: .5; 3 SOLUTION RESPIRATORY (INHALATION) at 02:11

## 2020-11-23 RX ADMIN — Medication 10 ML: at 01:11

## 2020-11-23 RX ADMIN — PANTOPRAZOLE SODIUM 40 MG: 40 TABLET, DELAYED RELEASE ORAL at 06:11

## 2020-11-23 RX ADMIN — DULOXETINE 60 MG: 30 CAPSULE, DELAYED RELEASE ORAL at 07:11

## 2020-11-23 RX ADMIN — DONEPEZIL HYDROCHLORIDE 10 MG: 5 TABLET, FILM COATED ORAL at 08:11

## 2020-11-23 RX ADMIN — METHOCARBAMOL TABLETS 500 MG: 500 TABLET, COATED ORAL at 05:11

## 2020-11-23 RX ADMIN — ENOXAPARIN SODIUM 40 MG: 40 INJECTION SUBCUTANEOUS at 08:11

## 2020-11-23 RX ADMIN — PRAVASTATIN SODIUM 40 MG: 40 TABLET ORAL at 08:11

## 2020-11-23 RX ADMIN — GUAIFENESIN 600 MG: 600 TABLET, EXTENDED RELEASE ORAL at 08:11

## 2020-11-23 RX ADMIN — METHOCARBAMOL TABLETS 500 MG: 500 TABLET, COATED ORAL at 09:11

## 2020-11-23 RX ADMIN — MEMANTINE 10 MG: 10 TABLET ORAL at 08:11

## 2020-11-23 NOTE — SUBJECTIVE & OBJECTIVE
Review of Systems   Constitutional: Positive for activity change. Negative for fever.   Cardiovascular: Negative for chest pain.   Gastrointestinal: Negative for abdominal pain.   Musculoskeletal: Positive for back pain.   Neurological: Positive for weakness.   Psychiatric/Behavioral: Negative for confusion.     Objective:     Vital Signs (Most Recent):  Temp: 97.6 °F (36.4 °C) (11/23/20 0750)  Pulse: 72 (11/23/20 0750)  Resp: 18 (11/23/20 0750)  BP: (!) 112/53 (11/23/20 0750)  SpO2: (!) 94 % (11/23/20 0750) Vital Signs (24h Range):  Temp:  [97.6 °F (36.4 °C)-98.4 °F (36.9 °C)] 97.6 °F (36.4 °C)  Pulse:  [64-75] 72  Resp:  [16-20] 18  SpO2:  [93 %-96 %] 94 %  BP: (106-129)/(53-64) 112/53     Weight: 128.4 kg (283 lb 1.1 oz)  Body mass index is 40.62 kg/m².    Intake/Output Summary (Last 24 hours) at 11/23/2020 1028  Last data filed at 11/23/2020 0800  Gross per 24 hour   Intake 520 ml   Output 1700 ml   Net -1180 ml      Physical Exam  Vitals signs and nursing note reviewed.   Constitutional:       General: She is not in acute distress.     Appearance: She is well-developed. She is obese.   HENT:      Head: Normocephalic and atraumatic.      Nose: Nose normal.      Mouth/Throat:      Mouth: Mucous membranes are moist.      Pharynx: Oropharynx is clear.   Eyes:      Extraocular Movements: Extraocular movements intact.      Conjunctiva/sclera: Conjunctivae normal.      Pupils: Pupils are equal, round, and reactive to light.   Neck:      Musculoskeletal: Neck supple.      Thyroid: No thyromegaly.   Cardiovascular:      Rate and Rhythm: Normal rate and regular rhythm.      Pulses: Normal pulses.      Heart sounds: No murmur. No friction rub. No gallop.    Pulmonary:      Effort: Pulmonary effort is normal. No respiratory distress.      Breath sounds: No wheezing, rhonchi or rales.   Abdominal:      General: Bowel sounds are normal.      Palpations: Abdomen is soft.   Musculoskeletal:      Right lower leg: No edema.       Left lower leg: No edema.   Lymphadenopathy:      Cervical: No cervical adenopathy.   Skin:     General: Skin is warm and dry.   Neurological:      General: No focal deficit present.      Mental Status: She is alert.      Cranial Nerves: No cranial nerve deficit.      Motor: Weakness present.      Gait: Gait abnormal.   Psychiatric:         Mood and Affect: Mood normal.         Behavior: Behavior normal.         Significant Labs: procal 0.04  BMP:   Recent Labs   Lab 11/23/20  0536   GLU 85      K 4.1   CL 99   CO2 31*   BUN 14   CREATININE 1.2   CALCIUM 10.1   MG 2.3   phos 3.7  Mag 1.9  Lab Results   Component Value Date    WBC 6.40 11/23/2020    HGB 10.2 (L) 11/23/2020    HCT 34.4 (L) 11/23/2020    MCV 92 11/23/2020     11/23/2020 11/20 >>16.2     11/20 sed rate 44    11/19 vanc trough 19.5. 11/21 17.4    MRI thoracic spine   1. Altered signal intensity changes at T9/T10 with evidence of erosive focus about the opposing endplates and evidence of enhancing phlegmonous formation identified in the central component of the disc with central low signal intensity compatible with osteomyelitis/discitis.  Vertical dimensions of the area of interest cause extreme erosion along the anterior portion of the vertebral bodies of interest without evidence of any significant paraspinal component.  2. No significant soft tissue component.  3. Chronic degenerative spondylosis changes of the remaining segments of the thoracic spine.  4. Chronic degenerative changes of the entirety of the cervical spine.

## 2020-11-23 NOTE — PLAN OF CARE
Patient Agrees and Compliant with Plan of Care:  Monitor Breathing Pattern; Bilateral Breath sounds auscultated posteriorly diminished and clear throughout. Oxygen saturation 93-94% on O2 at 2L NC.  Maintain Pain Regimen; Patient received her scheduled Tylenol this shift.  Monitor Vital Signs; B/P 106/64. Will repeat B/P at 4am.  Administer IV Antibiotic as ordered; Patient receiving Vancomycin 750mg Q 24 hrs. Did receive a dose early this shift.   Monitor Vancomycin Trough as ordered; Next trough to be drawn on 11/24/20 at 1900.  Maintain PICC line to upper right arm; Note Red Port flushes well . Unable to flush other port. Dressing D/I; Dressing change due to be changed today.  Fall Precautions: Maintain Patient Safety; Patient admit to sitting on side of bed with PT and not strong on her legs. Time allowed to verbalize. Encouragement provided.

## 2020-11-23 NOTE — ASSESSMENT & PLAN NOTE
Increase lisinopril 2.5>10mg daily  10/23 SBP 160s at times; lisinopril just increased will give more time for lisinopril to work before titration  10/26 SBP 140s; cont to monitor  10/28 /80- increase lisinopril  10/30 BP much better 119/59- 138/62  .  11/2 /58.  VSS   11/9 /56- well controlled (cont lisinopril, lasix, isosorbide, metoprolol).  11/23 112/53 well controlled

## 2020-11-23 NOTE — PT/OT/SLP PROGRESS
Occupational Therapy      Patient Name:  Martina Betancourt   MRN:  [id number]    Patient not seen today secondary to Unavailable (Comment)(patient in virtual MD visit on phone upon 2 trials). Will follow-up as appropriate.    Golden Bartlett OT  11/23/2020

## 2020-11-23 NOTE — ASSESSMENT & PLAN NOTE
Was walking with walker prior to pneumonia/bacteremia admit last month then after d.c was only w/c bound (gonzalez round) will add pt here and try and get her as strong as poss as she lives at home with family.   10/23 Chronic co back ache but also has recent high ankle fx ; per EDDIE Vinson NP Spoke with Ariela VASQUES who will see patient while on SWING. She looked over x rays and hx and reports that patient can weight bear as tolerated. Nurse/OT/MD notified    · 10/26 Supine to Sit: maximal assistance, of 2 persons and patient able to reach with UE to push through rail and with improved push off with UE and initiation  · Sit to Supine: maximal assistance of 2 people with improved ability of patient to control descent of upper body  · Transfers:     · Sit to Stand:  moderate assistance, maximal assistance of 2 persons with no AD.  PT and PT tech blocking both knees while assisting pt to elevate hips from bed  Balance: pt able to stand x15 seconds EOB limited largely by knee and back pain.     10/28.  · Bed Mobility:     · Rolling Left:  moderate assistance  · Rolling Right: moderate assistance  · Scooting: maximal assistance  · Supine to Sit: maximum/moderate assistace and cues  · Sit to Supine: maximum assistace x 1-2 and cues  · Transfers:     · Sit to Stand:  maximum assistance and cues inside the parallel bars with facilitation tech  · Bed to Chair: to wheelchair  with  maximum assistance and cues  using  Slide Board  · Balance: Standing Static inside the parallel bars with Poor grade. Tolerated for ~10 seconds with 2 attempts with  Maximum assistance and cues.  10/30 Standing with RW Static: Poor for ~15 seconds tolerance with max Assistance and cues  11/2  wheelchair maximum assistance x 2 and cues  with  slide board  using  slide/scoot tech  11/4 Sit to Stand:  Max/moderate assistance and cues with standard walker  Balance: Static Stand with Std Walker: Fair- with 2 mins and 7 seconds  tolerance and  max/moderate assistance and cues  · 11/6 max/moderate assistance and cues  with standard walker  Balance: Standing Static with std walker: Poor+ for 1 minute and 15 seconds(1st attempt); 45 seconds(2nd attempt)    11/9 she is becoming stronger  · Cont with PT  · Rolling Left:  stand by assistance  · Rolling Right: stand by assistance  · Scooting: stand by assistance  · Supine to Sit: contact guard assistance  · Sit to Supine: minimum assistance  · Transfers:     Sit to Stand:  moderate assistance with rolling walker.3    11/13/20 Transfers:     · Sit to Stand:  Moderate assistance and cues with standard walker  Balance: Standing with Std Walker Static: Poor+ for  1 minute and  20 seconds(1st attempt); 35 seconds(2nd attempt). woth Moderate Assistance and cues    · 11/16 Scooting: minimum assistance  · Bridging: minimum assistance  · Supine to Sit: minimum assistance  · Sit to Supine: moderate assistance  · Transfers:     · Sit to Stand:  minimum assistance and moderate assistance with standard walker  Balance: Modified independent with static sitting at edge of bed, min assist with static standing with SW     11/18 - Sit to Stand:  moderate assiotance and cues with standard walker  · Balance: Standing with std walker Static: Fair for ~50 seconds tolerance(1st attempt) ; ~10 seconds tolerance(2nd attempt)  11/19 pt was able stand up and transfer  to Bedside commode for the first time today      · 11/23 Sit to Stand:  minimum assistance with rolling walker  Balance: sitting good, standing Poor

## 2020-11-23 NOTE — PT/OT/SLP PROGRESS
"Physical Therapy Treatment    Patient Name:  Martina Betancourt   MRN:  [id number]    Recommendations:     Discharge Recommendations:  nursing facility, basic   Discharge Equipment Recommendations: hospital bed   Barriers to discharge: Decreased caregiver support    Assessment:     Martina Betancourt is a 70 y.o. female admitted with a medical diagnosis of Debility.  She presents with the following impairments/functional limitations:  weakness, impaired endurance, impaired self care skills, impaired functional mobilty, gait instability, impaired balance, decreased lower extremity function, pain, decreased ROM. Patient tolerated well PT session. Patient is showing steady progress with bed mobility. Noted lesser assistance needed in rolling to sides and in supine to sit. Able to scoot better on side of the bed at sitting in preparation to transfers at bedside commode.    Rehab Prognosis: Fair; patient would benefit from acute skilled PT services to address these deficits and reach maximum level of function.    Recent Surgery: * No surgery found *      Plan:     During this hospitalization, patient to be seen daily to address the identified rehab impairments via gait training, therapeutic activities, therapeutic exercises and progress toward the following goals:    · Plan of Care Expires:  12/01/20    Subjective     Chief Complaint: chronic lower back pain  Patient/Family Comments/goals: "To control the pain and able to move better".  Pain/Comfort:  · Pain Rating 1: 8/10  · Location - Side 1: Right  · Location - Orientation 1: lower  · Location 1: back  · Pain Addressed 1: Reposition  · Pain Rating Post-Intervention 1: 8/10      Objective:     Communicated with patient  prior to session.  Patient found supine with peripheral IV, oxygen, telemetry upon PT entry to room.     General Precautions: Standard, fall   Orthopedic Precautions:N/A   Braces: N/A     Functional Mobility:  · Bed Mobility:     · Rolling Left:  " supervision  · Rolling Right: supervision  · Scooting: supervision  · Supine to Sit: stand by assistance  · Sit to Supine: moderate assistance and cues in ascending bilat LE  · Transfers:  Bed to bedside commode with removable arm rest: minimal assistance  and cues  with  draw sheet  using  slide/scoot tech  · Balance: Sit Static and Dynamic: Good at side of the bed      AM-PAC 6 CLICK MOBILITY  Turning over in bed (including adjusting bedclothes, sheets and blankets)?: 3  Sitting down on and standing up from a chair with arms (e.g., wheelchair, bedside commode, etc.): 2  Moving from lying on back to sitting on the side of the bed?: 3  Moving to and from a bed to a chair (including a wheelchair)?: 2  Need to walk in hospital room?: 1  Climbing 3-5 steps with a railing?: 1  Basic Mobility Total Score: 12       Therapeutic Activities and Exercises:   Implemented body mechanics on bed mobility, sitting balance at side of the bed  And bedside commode transfers; Performed bed mobility ex such as rolling to sides, supine to sit and scooting on different directions at supine and sitting position.    Patient left supine with all lines intact, call button in reach, bed alarm on and nursing notified..    GOALS:   Multidisciplinary Problems     Physical Therapy Goals        Problem: Physical Therapy Goal    Goal Priority Disciplines Outcome Goal Variances Interventions   Physical Therapy Goal     PT, PT/OT Ongoing, Progressing     Description: Goals to be met by: 11/10/20    Patient will increase functional independence with mobility by performin. Rolling to sides using bed rail with contact guard assistance and cues  2. Supine to sit with minimal assistance and cues  3. Sit to supine with moderate assistance and cues  4. Tolerate Static Sit at side of the bed for 10 minutes with Standby Assistance  5. Bed to chair transfer with moderate assistance and cues using stand step TECHNIQUE  6. Lower extremity exercise program  x15 reps per handout, with assistance as needed                      Time Tracking:     PT Received On: 11/23/20  PT Start Time: 1323     PT Stop Time: 1355  PT Total Time (min): 32 min     Billable Minutes: Therapeutic Activity 32    Treatment Type: Treatment  PT/PTA: PT     PTA Visit Number: 0     Edgar Lamas, PT  11/23/2020

## 2020-11-23 NOTE — ASSESSMENT & PLAN NOTE
vanc 1450mg IV every 24hr x 8 weeks total till 12/3 per ID ; will stay here for the duration   PT  vanc now 1000mg IV every 24hr till 12/3 per ID last vanc trough 10/31 15.8    Reach out to ns today for guidance on re-imaging  Noted per ID Plan for 6 - 8 weeks of IV antibiotics with repeat MRI at midpoint of treatment as no surgical drainage undertaken.  - ID will follow.  She is currently midpoint; will plan for MRI of thoracic on Monday .  Cont vanc now 750mg IV daily with trough 11/7 16.3 .  11/13 remains max assist; can stand for 1m 20sec; awaiting ID assessment of MRI    11/11 contacted neurosurgery for repeat MRI review. She is only able to stand at bedside with therapy for 1min and 15 sec with mod to max assist. She would be a difficult transfer at this point.   11/16 will repeat message to neurosurgery as well as ID to eval patient maybe virtual visit. She would be a very difficult transfer for in person visit  11/18 no response from ID ; pt set up for AYANNA for OP appnt with ID today   11/20 Still waiting on ID recs, multiple messages sent to neurosurgery; Georgina Morrison and Luis E Calabrese as well as ID; Neftali Rm. We are trying to follow discharge plan from acute stay with repeat MRI and ID follow up and awaiting recommendations. Cont current treatment pending recs    11/23 Dr juan discussed case with Dr Rm and recs to cont plan with vanc till 12/3

## 2020-11-23 NOTE — PLAN OF CARE
Patient admitted as SWING, Assessment complete per flow sheet, AAOX4, RR even unlabored BBS diminished, on 2 L NC. Abdomen soft, non distended, with active bowel sounds x 4 quads.Tolerating diet well. PICC to RT arm SL, purple port does not flush, red port sluggish, Dsg C/D/I. Medications administered per MAR, tolerated well. HERMELINDA heels elevated off bed with pillows, safety precautions maintained, bed alarm on, free from falls/ injury, call bell in reach, instructed to call for needs, voiced understanding.agrees with plan of care.  Patient had video appointment with neuro today at 1200.  Patient sitting up in bed eating supper, NAD noted.  Chlorhexidine bath given by staff.

## 2020-11-23 NOTE — PLAN OF CARE
Problem: Physical Therapy Goal  Goal: Physical Therapy Goal  Description: Goals to be met by: 11/10/20    Patient will increase functional independence with mobility by performin. Rolling to sides using bed rail with contact guard assistance and cues  2. Supine to sit with minimal assistance and cues  3. Sit to supine with moderate assistance and cues  4. Tolerate Static Sit at side of the bed for 10 minutes with Standby Assistance  5. Bed to chair transfer with moderate assistance and cues using stand step TECHNIQUE  6. Lower extremity exercise program x15 reps per handout, with assistance as needed     Outcome: Ongoing, Progressing   20 Revised PT goals  Goals to be met 20:  Rolling to side  using bed rail with supervision/modified independent.  Supine to sit with supervision  Sit to supine with moderate/minimum assistance and cues  Bed to bedside commode transfers with supervision.  Increase Static Stand tolerance with std walker x  ~ 2 minutes.  Lower extremity exercise program x15 reps per handout, with assistance as needed.

## 2020-11-23 NOTE — PROGRESS NOTES
[facility] Medicine  Progress Note    Patient Name: Martina Betancourt  MRN: [id number]  Patient Class: IP- Swing   Admission Date: 10/20/2020  Length of Stay: 34 days  Attending Physician: Vamsi Manuel MD  Primary Care Provider: Joe Fuller Iii, MD        Subjective:     Principal Problem:Debility        HPI:  71yo female patient with hx of alzheiners, CAD, HLD, HTN, myopathy, MI, obesity, sleep apnea and OA (knees non ambulatory uses gonzalez round). She was living at home with her daughter. Recently admitted to [facility] with MRSA bacteremia and subsequent pneumonia treated with Zyvox x 7 days with clearance of her bacteremia now admitted with back pain found to have T9-10 osteo discitis, T8-10 epidural phlegmon with associated paraverterbral abscesses. Spine infection likely hematogenous from under treated bacteremia. Neurosurgery has been consulted. She has been on Vancomycin and Ceftriaxone. Underwent T9-10 disc space biopsy with IR on 10/16. Cultures negative, though had been on IV antibiotics multiple days prior. Afebrile. HDS. Repeat blood cx sterile. ID rec cont vanc 1750mg q 24 till 12/3.     Overview/Hospital Course:  10/23 Here for skilled ; needing IV abx for spinal abscess;  vanc 1750mg q 24 till 12/3  Afebrile , 3LNC - O2 sat 95%   + debility, very deconditioned; bilt LE x 10 reps followed by bed mobility trng and static sit balance and tolerance at side of the bed  C/o back pain with activity; Occupational therapist notes that she is very resistant to movement and c/o severe pain with minimal activity.   Will order toradol 15mg x 1dose IV; pt did much better after toradol rx today per OT. Will order daily prior to OT as tolerated  Monitor renal fx     10/26 here for skilled therapy and cont IV abc. Vanc 1750mg iv every 12hr. Noted elevated WBC this am and she report that she has had diarrhea for the last 4 days.   · PT reports Supine to Sit: maximal assistance, of 2  persons and patient able to reach with UE to push through rail and with improved push off with UE and initiation  · Sit to Supine: maximal assistance of 2 people with improved ability of patient to control descent of upper body  · Transfers:     · Sit to Stand:  moderate assistance, maximal assistance of 2 persons with no AD.  PT and PT tech blocking both knees while assisting pt to elevate hips from bed  Balance: pt able to stand x15 seconds EOB limited largely by knee and back pain.       10/28  She is still on vanc IV daily. No longer with diarrhea. Did have heme positive stools H/H stable on lovenox for DVT prophylaxis and plavix. Will monitor h/h M/Thurs. No fever. No elevated WBC  pt is really immobile.  · Bed Mobility:     · Rolling Left:  moderate assistance  · Rolling Right: moderate assistance  · Scooting: maximal assistance  · Supine to Sit: maximum/moderate assistace and cues  · Sit to Supine: maximum assistace x 1-2 and cues  · Transfers:     · Sit to Stand:  maximum assistance and cues inside the parallel bars with facilitation tech  · Bed to Chair: to wheelchair  with  maximum assistance and cues  using  Slide Board  · Balance: Standing Static inside the parallel bars with Poor grade. Tolerated for ~10 seconds with 2 attempts with  Maximum assistance and cues.  Cont PT daily  10/30  IV  vanc 1,000mg q 24hr x 8 weeks total till 12/3 per ID for discitis; random vanc 12.5 yesterday    No longer with diarrhea. Did have heme positive stools H/H stable on lovenox for DVT prophylaxis and plavix. H&H stable, lovenox stopped. No fever. No elevated WBC. She has productive cough this am. K+ 3.2 yesterday, getting lasix, will repeat KCL 40meq today   pt is really immobile.Standing with RW Static: Poor for ~15 seconds tolerance with max Assistance and cues  Has PICC line- one port clotted;       11/2 she remains on vanc 1000mg  q 24hr x 8 weeks total till 12/3 per ID for discitis. Vanc trough 15.8 10/31/20.  Vss/afebrile. intermittent back pain with prn pain meds helping. She is not doing much with PT. She uses gonzalez round at home but can transfer independently. Here she is max assist     11/4 Remains on vanc 1000mg  q 24hr x 8 weeks total till 12/3 per ID for discitis. Vanc trough 16.6 on 11/2   Afebrile , having regular BMs; getting Norco 10mg q 6   · Continues to require max assist; Sit to Stand:  Max/moderate assistance and cues with standard walker  Balance: Static Stand with Std Walker: Fair- with 2 mins and 7 seconds  tolerance and max/moderate assistance and cues  11/4 Remains on vanc 1000mg  q 24hr x 8 weeks total till 12/3 per ID for discitis. Vanc trough 16.6 on 11/2   Afebrile, WBC nml, creat stable , K+3.4  Requires maximal assist but finally standing with PT with walker .    11/9/20    Remains on vanc 1000mg  q 24hr x 8 weeks total till 12/3 per ID for discitis. VSS/afebrile. No elevated WBC. Last vanc trough 16.3 11/7  She is progressing with PT now min assist from sit to supine and mod assist sit to stand with RW,    11/11 remains on vanc 750mg IV every 24hr, dose monitored by pharm. Last vanc trough 16.5 11/9. Repeat MRI done Monday. Contact with neurosurgery sent to discuss comparison with original johny since she is mid way on IV abx and had no intervention. She has no comoplaints. VSS/labs reviewed. She is standing at bedside 1min and 15 sec with mod assist    11/13 remains on vanc 750mg IV every 24hr, dose monitored by pharm. Last vanc trough 14.8  11/11. Repeat MRI done Monday; mid tx as per ID recommendations;  messaged ID, shows severe Degenerative disease of spine   Able to stand now for over 1m with PT     11/16 remains on vanc every 24hr 750mg. Last vanc trough was 15.9 yesterday. Afebrile, no elevated WBC. She reports that she is feeling well. Getting stronger. Working with PT. Min assist with bed mobility and mod with standing.     11/18/20 planned for OP visit with ID this am   Remains on  "vanc every 24hr 750mg. Last vanc trough was 16.8  yesterday. Afebrile, no elevated WBC    11/20/20 Remains on vanc 1000mg  q 24hr x 8 weeks total till 12/3 per ID for discitis. VSS/afebrile. No elevated WBC. Last vanc trough 19.2 yesterday ;  Went to appnt with ID 11/18; she reports they told her she still has "three pockets of infection"; however, the note is incomplete and does not provide further recommendations;message sent in Epic this am.   11/20 pt was able stand up and transfer  to Bedside commode for the first time today, also had a BM   ESR/CRP pending otherwise labs stable    11/23/20 pt remains on Vanc 750mg IV every 24hr till 12/3. ID saw patient last week and recommends continued IV abx through the coarse. Neuro surgery has been set up for virtual visit this am. crp improved 139>16 sed rate 44. Labs and VSS. She is min to contact assist with getting up. She is not ambulating though. Case management working with patient and daughter about her d/c plan.       Review of Systems   Constitutional: Positive for activity change. Negative for fever.   Cardiovascular: Negative for chest pain.   Gastrointestinal: Negative for abdominal pain.   Musculoskeletal: Positive for back pain.   Neurological: Positive for weakness.   Psychiatric/Behavioral: Negative for confusion.     Objective:     Vital Signs (Most Recent):  Temp: 97.6 °F (36.4 °C) (11/23/20 0750)  Pulse: 72 (11/23/20 0750)  Resp: 18 (11/23/20 0750)  BP: (!) 112/53 (11/23/20 0750)  SpO2: (!) 94 % (11/23/20 0750) Vital Signs (24h Range):  Temp:  [97.6 °F (36.4 °C)-98.4 °F (36.9 °C)] 97.6 °F (36.4 °C)  Pulse:  [64-75] 72  Resp:  [16-20] 18  SpO2:  [93 %-96 %] 94 %  BP: (106-129)/(53-64) 112/53     Weight: 128.4 kg (283 lb 1.1 oz)  Body mass index is 40.62 kg/m².    Intake/Output Summary (Last 24 hours) at 11/23/2020 1028  Last data filed at 11/23/2020 0800  Gross per 24 hour   Intake 520 ml   Output 1700 ml   Net -1180 ml      Physical Exam  Vitals signs " and nursing note reviewed.   Constitutional:       General: She is not in acute distress.     Appearance: She is well-developed. She is obese.   HENT:      Head: Normocephalic and atraumatic.      Nose: Nose normal.      Mouth/Throat:      Mouth: Mucous membranes are moist.      Pharynx: Oropharynx is clear.   Eyes:      Extraocular Movements: Extraocular movements intact.      Conjunctiva/sclera: Conjunctivae normal.      Pupils: Pupils are equal, round, and reactive to light.   Neck:      Musculoskeletal: Neck supple.      Thyroid: No thyromegaly.   Cardiovascular:      Rate and Rhythm: Normal rate and regular rhythm.      Pulses: Normal pulses.      Heart sounds: No murmur. No friction rub. No gallop.    Pulmonary:      Effort: Pulmonary effort is normal. No respiratory distress.      Breath sounds: No wheezing, rhonchi or rales.   Abdominal:      General: Bowel sounds are normal.      Palpations: Abdomen is soft.   Musculoskeletal:      Right lower leg: No edema.      Left lower leg: No edema.   Lymphadenopathy:      Cervical: No cervical adenopathy.   Skin:     General: Skin is warm and dry.   Neurological:      General: No focal deficit present.      Mental Status: She is alert.      Cranial Nerves: No cranial nerve deficit.      Motor: Weakness present.      Gait: Gait abnormal.   Psychiatric:         Mood and Affect: Mood normal.         Behavior: Behavior normal.         Significant Labs: procal 0.04  BMP:   Recent Labs   Lab 11/23/20  0536   GLU 85      K 4.1   CL 99   CO2 31*   BUN 14   CREATININE 1.2   CALCIUM 10.1   MG 2.3   phos 3.7  Mag 1.9  Lab Results   Component Value Date    WBC 6.40 11/23/2020    HGB 10.2 (L) 11/23/2020    HCT 34.4 (L) 11/23/2020    MCV 92 11/23/2020     11/23/2020 11/20 >>16.2     11/20 sed rate 44    11/19 vanc trough 19.5. 11/21 17.4    MRI thoracic spine   1. Altered signal intensity changes at T9/T10 with evidence of erosive focus about the opposing  endplates and evidence of enhancing phlegmonous formation identified in the central component of the disc with central low signal intensity compatible with osteomyelitis/discitis.  Vertical dimensions of the area of interest cause extreme erosion along the anterior portion of the vertebral bodies of interest without evidence of any significant paraspinal component.  2. No significant soft tissue component.  3. Chronic degenerative spondylosis changes of the remaining segments of the thoracic spine.  4. Chronic degenerative changes of the entirety of the cervical spine.      Assessment/Plan:      * Debility  Was walking with walker prior to pneumonia/bacteremia admit last month then after d.c was only w/c bound (gonzalez round) will add pt here and try and get her as strong as poss as she lives at home with family.   10/23 Chronic co back ache but also has recent high ankle fx ; per EDDIE Vinson NP Spoke with Ariela VASQUES who will see patient while on SWING. She looked over x rays and hx and reports that patient can weight bear as tolerated. Nurse/OT/MD notified    · 10/26 Supine to Sit: maximal assistance, of 2 persons and patient able to reach with UE to push through rail and with improved push off with UE and initiation  · Sit to Supine: maximal assistance of 2 people with improved ability of patient to control descent of upper body  · Transfers:     · Sit to Stand:  moderate assistance, maximal assistance of 2 persons with no AD.  PT and PT tech blocking both knees while assisting pt to elevate hips from bed  Balance: pt able to stand x15 seconds EOB limited largely by knee and back pain.     10/28.  · Bed Mobility:     · Rolling Left:  moderate assistance  · Rolling Right: moderate assistance  · Scooting: maximal assistance  · Supine to Sit: maximum/moderate assistace and cues  · Sit to Supine: maximum assistace x 1-2 and cues  · Transfers:     · Sit to Stand:  maximum assistance and cues inside the parallel  bars with facilitation tech  · Bed to Chair: to wheelchair  with  maximum assistance and cues  using  Slide Board  · Balance: Standing Static inside the parallel bars with Poor grade. Tolerated for ~10 seconds with 2 attempts with  Maximum assistance and cues.  10/30 Standing with RW Static: Poor for ~15 seconds tolerance with max Assistance and cues  11/2  wheelchair maximum assistance x 2 and cues  with  slide board  using  slide/scoot tech  11/4 Sit to Stand:  Max/moderate assistance and cues with standard walker  Balance: Static Stand with Std Walker: Fair- with 2 mins and 7 seconds  tolerance and max/moderate assistance and cues  · 11/6 max/moderate assistance and cues  with standard walker  Balance: Standing Static with std walker: Poor+ for 1 minute and 15 seconds(1st attempt); 45 seconds(2nd attempt)    11/9 she is becoming stronger  · Cont with PT  · Rolling Left:  stand by assistance  · Rolling Right: stand by assistance  · Scooting: stand by assistance  · Supine to Sit: contact guard assistance  · Sit to Supine: minimum assistance  · Transfers:     Sit to Stand:  moderate assistance with rolling walker.3    11/13/20 Transfers:     · Sit to Stand:  Moderate assistance and cues with standard walker  Balance: Standing with Std Walker Static: Poor+ for  1 minute and  20 seconds(1st attempt); 35 seconds(2nd attempt). woth Moderate Assistance and cues    · 11/16 Scooting: minimum assistance  · Bridging: minimum assistance  · Supine to Sit: minimum assistance  · Sit to Supine: moderate assistance  · Transfers:     · Sit to Stand:  minimum assistance and moderate assistance with standard walker  Balance: Modified independent with static sitting at edge of bed, min assist with static standing with SW     11/18 - Sit to Stand:  moderate assiotance and cues with standard walker  · Balance: Standing with std walker Static: Fair for ~50 seconds tolerance(1st attempt) ; ~10 seconds tolerance(2nd attempt)  11/19 pt was  "able stand up and transfer  to Bedside commode for the first time today      · 11/23 Sit to Stand:  minimum assistance with rolling walker  Balance: sitting good, standing Poor    Cough productive of purulent sputum  Productive cough this am per nurse- " pale green, grey"   Add mucinex and give neb tx q 6 while awake  No fever, WBC normal   Increase activity ..  Laying flat today. No complaints SOB./cough.  On 2L NC pox 92-94%      Diarrhea  Stools d/c as diarrhea has improved. + heme occult . lovenox stopped cbc stable     11/20: patient still with significant immobility. H/H stable and no gross bleeding. Will resume lovenox.       Malnutrition of mild degree  Dietary.  Boost.      Hydronephrosis  Seen on CT 10/7 - u/a was negative .  Urinating without issue  No flank pain  No signs of infection  No hematuria      CAD (coronary artery disease)  Cont asa, plavix, lasix, isosorbide, lisinopril, toprol and statin.      HTN (hypertension)  Increase lisinopril 2.5>10mg daily  10/23 SBP 160s at times; lisinopril just increased will give more time for lisinopril to work before titration  10/26 SBP 140s; cont to monitor  10/28 /80- increase lisinopril  10/30 BP much better 119/59- 138/62  .  11/2 /58.  VSS   11/9 /56- well controlled (cont lisinopril, lasix, isosorbide, metoprolol).  11/23 112/53 well controlled     Discitis  vanc 1450mg IV every 24hr x 8 weeks total till 12/3 per ID ; will stay here for the duration   PT  vanc now 1000mg IV every 24hr till 12/3 per ID last vanc trough 10/31 15.8    Reach out to ns today for guidance on re-imaging  Noted per ID Plan for 6 - 8 weeks of IV antibiotics with repeat MRI at midpoint of treatment as no surgical drainage undertaken.  - ID will follow.  She is currently midpoint; will plan for MRI of thoracic on Monday .  Cont vanc now 750mg IV daily with trough 11/7 16.3 .  11/13 remains max assist; can stand for 1m 20sec; awaiting ID assessment of MRI    11/11 " contacted neurosurgery for repeat MRI review. She is only able to stand at bedside with therapy for 1min and 15 sec with mod to max assist. She would be a difficult transfer at this point.   11/16 will repeat message to neurosurgery as well as ID to eval patient maybe virtual visit. She would be a very difficult transfer for in person visit  11/18 no response from ID ; pt set up for [facility] for OP appnt with ID today   11/20 Still waiting on ID recs, multiple messages sent to neurosurgery; Georgina Morrison and Luis E Calabrese as well as ID; Neftali Rm. We are trying to follow discharge plan from acute stay with repeat MRI and ID follow up and awaiting recommendations. Cont current treatment pending recs    11/23 Dr juan discussed case with Dr Rm and recs to cont plan with vanc till 12/3    Severe obesity (BMI >= 40)  Using boost as she has low appetite and low protein       Obstructive sleep apnea treated with continuous positive airway pressure (CPAP)  continue home cpap      Dementia without behavioral disturbance  Cont namenda and aricept .      VTE Risk Mitigation (From admission, onward)         Ordered     enoxaparin injection 40 mg  Every 12 hours      11/21/20 0937                Discharge Planning   BRIT: 12/3/2020     Code Status: Prior   Is the patient medically ready for discharge?:     Reason for patient still in hospital (select all that apply): PT / OT recommendations  Discharge Plan A: Home with family, Home Health   Discharge Delays: None known at this time              Layne Juan MD  Department of Hospital Medicine   [facility]

## 2020-11-23 NOTE — PROGRESS NOTES
The patient location is: [facility]  The chief complaint leading to consultation is: thoracic osteodiscitis    Visit type: audiovisual    Face to Face time with patient: 15 minutes  30 minutes of total time spent on the encounter, which includes face to face time and non-face to face time preparing to see the patient (eg, review of tests), Obtaining and/or reviewing separately obtained history, Documenting clinical information in the electronic or other health record, Independently interpreting results (not separately reported) and communicating results to the patient/family/caregiver, or Care coordination (not separately reported).         Each patient to whom he or she provides medical services by telemedicine is:  (1) informed of the relationship between the physician and patient and the respective role of any other health care provider with respect to management of the patient; and (2) notified that he or she may decline to receive medical services by telemedicine and may withdraw from such care at any time.    Neurosurgery  Established Patient    SUBJECTIVE:     History of Present Illness:  Martina Betancourt is a 70 y.o. female with PMH of Alzheimer's dz, HTN, HLD, CAD, YOVANI, and morbid obesity who was recently hospitalized x2 for MRSA bacteremia complicated by pneumonia and possible UTI, treated with linozolid x8 days and cipro and discharged 10/1. She was then readmitted to [facility] on 10/8 with severe back pain. MRI revealed T9-10 osteodiscitis and T8-10 epidural phlegmon with associated paraverterbral abscesses. Spine infection likely hematogenous from under-treated bacteremia. Patient had no neurologic deficits on exam, no indications for emergent neurosurgical intervention, plan was made for conservative non-surgical treatment. She underwent needle aspiration of T9-10 disc space on 10/16, cultures were negative although she had already been undergoing antibiotic treatment. Repeat  "BCx remained no growth and she remained afebrile and neurologically stable. Patient was discharged to [facility] on 10/20 with plan for Vancomycin IV x 8 weeks (estimated end date 12/3).    Patient presents today for Neurosurgery follow-up with updated MRI via virtual visit. Patient continues to report back pain rated 8/10, although reports that it has improved significantly since discharging from the hospital. She states the pain mostly midline at the mid-low back. Denies radiation into the lower extremities or elsewhere. Aggravating factors are movement and ambulation. Alleviating factors are rest. States she has made progress with her mobility while at the skilled nursing facility. She reports she is now able to stand with a walker and assistance, which she has not been able to do, but not for longer than a few seconds. She reports to me that she was unable to walk much at baseline prior to her recent hospitalizations due to severe arthritis in her knees, uses a "gonzalez round" at home. She tells me she is unsure how much her back pain is limiting her mobility vs her knee pain. She denies any remi weakness in her lower extremities. Denies any numbness, tingling, bowel/bladder incontinence, and saddle anesthesia. She remains therapeutic on Vancomycin with planned end date of 12/3. Denies any recent fevers, chills, and n/v.    Review of patient's allergies indicates:   Allergen Reactions    Hydroxyzine hcl     Tizanidine        No current facility-administered medications for this visit.      No current outpatient medications on file.     Facility-Administered Medications Ordered in Other Visits   Medication Dose Route Frequency Provider Last Rate Last Dose    acetaminophen tablet 500 mg  500 mg Oral TID Jewels Bird NP   500 mg at 11/22/20 2019    acetaminophen tablet 650 mg  650 mg Oral Q6H PRN Jewels Bird NP        albuterol-ipratropium 2.5 mg-0.5 mg/3 mL nebulizer solution 3 mL  " 3 mL Nebulization Q6H WAKE Jewels Bird NP   3 mL at 11/23/20 0717    aluminum & magnesium hydroxide-simethicone 400-400-40 mg/5 mL suspension 30 mL  30 mL Oral Q6H PRN Jewels Bird NP   30 mL at 11/20/20 2022    aspirin EC tablet 81 mg  81 mg Oral Daily Jewels Bird NP   81 mg at 11/23/20 0754    bisacodyL EC tablet 5 mg  5 mg Oral Daily PRN Jewels Bird NP        calcium carbonate 200 mg calcium (500 mg) chewable tablet 500 mg  500 mg Oral BID PRN Jewels Bird NP   500 mg at 11/13/20 2139    clopidogreL tablet 75 mg  75 mg Oral Daily Jewels Bird NP   75 mg at 11/23/20 0755    donepeziL tablet 10 mg  10 mg Oral QHS Jewels Bird NP   10 mg at 11/22/20 2019    DULoxetine DR capsule 60 mg  60 mg Oral Daily Jewels Bird NP   60 mg at 11/23/20 0751    enoxaparin injection 40 mg  40 mg Subcutaneous Q12H Layne Abbott MD   40 mg at 11/23/20 0756    ferrous sulfate tablet 325 mg  325 mg Oral QHS Jewels Bird NP   325 mg at 11/22/20 2019    furosemide tablet 40 mg  40 mg Oral Daily Jewels Bird NP   40 mg at 11/23/20 0753    guaiFENesin 12 hr tablet 600 mg  600 mg Oral BID Jewels Bird NP   600 mg at 11/23/20 0753    HYDROcodone-acetaminophen  mg per tablet 1 tablet  1 tablet Oral Q6H PRN Jewels Bird NP   1 tablet at 11/23/20 0617    isosorbide mononitrate 24 hr tablet 60 mg  60 mg Oral Daily Jewels Bird NP   60 mg at 11/23/20 0755    lisinopriL tablet 10 mg  10 mg Oral Daily Jewels Bird NP   10 mg at 11/23/20 0753    melatonin tablet 6 mg  6 mg Oral Nightly PRN Jewels Bird NP   6 mg at 11/09/20 2009    memantine tablet 10 mg  10 mg Oral BID Jewelsjoshua Gatese, NP   10 mg at 11/23/20 0753    methocarbamoL tablet 500 mg  500 mg Oral QID Jewels GDana Gatese, NP   500 mg at 11/23/20 0752    metoprolol succinate (TOPROL-XL) 24 hr tablet 50 mg  50 mg Oral Daily Jewels Bird,  NP   50 mg at 11/23/20 0754    miconazole nitrate 2% ointment   Topical (Top) BID Jewels CASTAÑEDADana Bird NP        ondansetron disintegrating tablet 4 mg  4 mg Oral Q8H PRN Jewels CASTAÑEDADana Parthsivan, NP   4 mg at 11/21/20 0312    pantoprazole EC tablet 40 mg  40 mg Oral Before breakfast Jewels GDana Bird, NP   40 mg at 11/23/20 0613    pravastatin tablet 40 mg  40 mg Oral QHS Jewels GDana Bird NP   40 mg at 11/22/20 2019    sodium chloride 0.9% flush 10 mL  10 mL Intravenous PRN Jewels CASTAÑEDADana Bird, NP        sodium chloride 0.9% flush 10 mL  10 mL Intravenous Q6H Jewels Bird, NP   10 mL at 11/23/20 0614    And    sodium chloride 0.9% flush 10 mL  10 mL Intravenous PRN Jewels CASTAÑEDADana Parthsivan NP   10 mL at 11/18/20 0159    vancomycin 750 mg in dextrose 5 % 250 mL IVPB (ready to mix system)  750 mg Intravenous Q24H Vamsi Manuel  mL/hr at 11/22/20 2013 750 mg at 11/22/20 2013       Past Medical History:   Diagnosis Date    Alzheimer disease     Alzheimer disease     Coronary artery disease     Hyperlipidemia     Hypertension     Myopathy     Non-ST elevation (NSTEMI) myocardial infarction     Obesity     Pneumonia     Sleep apnea      Past Surgical History:   Procedure Laterality Date    APPENDECTOMY      APPENDECTOMY      CHOLECYSTECTOMY      CORONARY STENT PLACEMENT      HYSTERECTOMY      TONSILLECTOMY       Family History     None        Social History     Socioeconomic History    Marital status:      Spouse name: Not on file    Number of children: Not on file    Years of education: Not on file    Highest education level: Not on file   Occupational History    Not on file   Social Needs    Financial resource strain: Not on file    Food insecurity     Worry: Not on file     Inability: Not on file    Transportation needs     Medical: Not on file     Non-medical: Not on file   Tobacco Use    Smoking status: Unknown If Ever Smoked   Substance and Sexual Activity     Alcohol use: Not Currently    Drug use: Never    Sexual activity: Not Currently   Lifestyle    Physical activity     Days per week: Not on file     Minutes per session: Not on file    Stress: Not on file   Relationships    Social connections     Talks on phone: Not on file     Gets together: Not on file     Attends Congregational service: Not on file     Active member of club or organization: Not on file     Attends meetings of clubs or organizations: Not on file     Relationship status: Not on file   Other Topics Concern    Not on file   Social History Narrative    Not on file       Review of Systems   Constitutional: Positive for fatigue. Negative for chills and fever.   HENT: Negative for nosebleeds and trouble swallowing.    Eyes: Negative for photophobia and visual disturbance.   Respiratory: Negative for cough and shortness of breath.    Gastrointestinal: Negative for nausea and vomiting.   Endocrine: Negative for cold intolerance and heat intolerance.   Genitourinary: Negative for difficulty urinating and dysuria.   Musculoskeletal: Positive for arthralgias, back pain and myalgias.   Neurological: Negative for weakness, numbness and headaches.   Psychiatric/Behavioral: Negative for agitation and confusion.       OBJECTIVE:     Vital Signs     There is no height or weight on file to calculate BMI.    Neurosurgery Physical Exam    General: well developed, well nourished, no distress.   Head: normocephalic  Neurologic: Alert and oriented. Thought content appropriate.  GCS: Motor: 6/Verbal: 5/Eyes: 4 GCS Total: 15  Mental Status: Awake, Alert, Oriented x 4  Language: No aphasia  Speech: No dysarthria  Cranial nerves: CN II-XII grossly intact.   Eyes: EOMI appear grossly intact  Pulmonary: no signs of respiratory distress. NC in place.  Motor Strength: Moves all extremities spontaneously with good tone. BUE and BLE are at least 3/5 throughout. No abnormal movements seen.         Diagnostic Results:  MRI  thoracic spine w/wo contrast done 11/9/2020 was independently reviewed by myself and Dr. Monteiro, along with the associated radiology report. Significant for signal changes surrounding T9/T10 consistent with endplate erosion extending into both vertebral bodies, associated increased peripheral enhancement concerning for ongoing osteodiscitis/myelitis. Also concerning for component of phlegmon within central disc space.      ASSESSMENT/PLAN:     Martina Betancourt is a 70 y.o. female who presents for follow-up of T9/T10 osteodiscitis/myelitis. Patient remains neurologically and clinically stable. Her back pain and mobility are improving. She has been afebrile and CRP down-trending, on Vancomycin with therapeutic levels. However, repeat MRI thoracic spine concerning for ongoing infection as large degree of bony erosion, may signify instability but will need CT to evaluate bone. Discussed with patient that this is a likely indication for surgical intervention.    Plan:  - CT thoracic spine for further evaluation of bone quality  - Follow-up with Dr. Monteiro within the next 2 weeks for further discussion, may be virtual visit  - Continue Vancomycin through 12/3 per ID recs  - Encouraged patient to call the clinic with any questions, concerns, or exam changes prior to follow up appt.       Georgina Morrison PA-C  Neurosurgery  [facility]

## 2020-11-23 NOTE — PLAN OF CARE
11/23/20 1106   Post-Acute Status   Post-Acute Authorization HME;Home Health   HME Status Awaiting Internal medical Clearance   Home Health Status Awaiting Internal Medical Clearance   Discharge Delays None known at this time       SW spoke with patient in regards to discharge disposition. Prior to admit, patient able to transfer to hover round independently. Patient is now basically bed bound and has only recently begun to transfer to the bedside commode. Patient states that she plans to go home at discharge and will be able to transfer independently at that time. States that she has all needed equipment at home but could use another rolling walker. Patient would like to use [facility] at discharge as she has used them in the past for home care. Patient also states that [facility] who comes to her [facility] when needed to assist her with her needs.     SW then spoke with patient's daughter who reports that her plan is to take the patient home at discharge. States that she or another family member will be available for transport home at discharge. SW inquired about patient's new functional status. Explained that the patient has transferred a few times but needs assistance with transferring. Also explained that patient is basically bed bound at this time. Daughter, Sharon, states understanding and says that the patient was at this functional status before admission and that the family has all needed equipment to care for her at home.     SW to remain available for discharge planning.     Ana Hawk LMSW

## 2020-11-24 LAB
ANION GAP SERPL CALC-SCNC: 10 MMOL/L (ref 8–16)
BUN SERPL-MCNC: 14 MG/DL (ref 8–23)
CALCIUM SERPL-MCNC: 10.2 MG/DL (ref 8.7–10.5)
CHLORIDE SERPL-SCNC: 100 MMOL/L (ref 95–110)
CO2 SERPL-SCNC: 29 MMOL/L (ref 23–29)
CREAT SERPL-MCNC: 1.1 MG/DL (ref 0.5–1.4)
EST. GFR  (AFRICAN AMERICAN): 58 ML/MIN/1.73 M^2
EST. GFR  (NON AFRICAN AMERICAN): 50 ML/MIN/1.73 M^2
GLUCOSE SERPL-MCNC: 84 MG/DL (ref 70–110)
POTASSIUM SERPL-SCNC: 4.1 MMOL/L (ref 3.5–5.1)
SODIUM SERPL-SCNC: 139 MMOL/L (ref 136–145)
VANCOMYCIN TROUGH SERPL-MCNC: 16.3 UG/ML (ref 10–22)

## 2020-11-24 PROCEDURE — 97803 MED NUTRITION INDIV SUBSEQ: CPT

## 2020-11-24 PROCEDURE — 25000003 PHARM REV CODE 250: Performed by: NURSE PRACTITIONER

## 2020-11-24 PROCEDURE — 25000242 PHARM REV CODE 250 ALT 637 W/ HCPCS: Performed by: NURSE PRACTITIONER

## 2020-11-24 PROCEDURE — 80048 BASIC METABOLIC PNL TOTAL CA: CPT

## 2020-11-24 PROCEDURE — 36415 COLL VENOUS BLD VENIPUNCTURE: CPT

## 2020-11-24 PROCEDURE — 63600175 PHARM REV CODE 636 W HCPCS: Performed by: INTERNAL MEDICINE

## 2020-11-24 PROCEDURE — 94799 UNLISTED PULMONARY SVC/PX: CPT

## 2020-11-24 PROCEDURE — A4216 STERILE WATER/SALINE, 10 ML: HCPCS | Performed by: NURSE PRACTITIONER

## 2020-11-24 PROCEDURE — 27000221 HC OXYGEN, UP TO 24 HOURS

## 2020-11-24 PROCEDURE — 63600175 PHARM REV CODE 636 W HCPCS: Performed by: FAMILY MEDICINE

## 2020-11-24 PROCEDURE — 80202 ASSAY OF VANCOMYCIN: CPT

## 2020-11-24 PROCEDURE — 11000004 HC SNF PRIVATE

## 2020-11-24 PROCEDURE — 97530 THERAPEUTIC ACTIVITIES: CPT

## 2020-11-24 PROCEDURE — 94640 AIRWAY INHALATION TREATMENT: CPT

## 2020-11-24 PROCEDURE — 94761 N-INVAS EAR/PLS OXIMETRY MLT: CPT

## 2020-11-24 PROCEDURE — 97535 SELF CARE MNGMENT TRAINING: CPT

## 2020-11-24 PROCEDURE — 25000003 PHARM REV CODE 250: Performed by: FAMILY MEDICINE

## 2020-11-24 RX ADMIN — PRAVASTATIN SODIUM 40 MG: 40 TABLET ORAL at 08:11

## 2020-11-24 RX ADMIN — CLOPIDOGREL 75 MG: 75 TABLET, FILM COATED ORAL at 08:11

## 2020-11-24 RX ADMIN — ISOSORBIDE MONONITRATE 60 MG: 60 TABLET, EXTENDED RELEASE ORAL at 08:11

## 2020-11-24 RX ADMIN — FERROUS SULFATE TAB 325 MG (65 MG ELEMENTAL FE) 325 MG: 325 (65 FE) TAB at 08:11

## 2020-11-24 RX ADMIN — METOPROLOL SUCCINATE 50 MG: 50 TABLET, EXTENDED RELEASE ORAL at 08:11

## 2020-11-24 RX ADMIN — IPRATROPIUM BROMIDE AND ALBUTEROL SULFATE 3 ML: .5; 3 SOLUTION RESPIRATORY (INHALATION) at 02:11

## 2020-11-24 RX ADMIN — HYDROCODONE BITARTRATE AND ACETAMINOPHEN 1 TABLET: 10; 325 TABLET ORAL at 02:11

## 2020-11-24 RX ADMIN — Medication 10 ML: at 12:11

## 2020-11-24 RX ADMIN — LISINOPRIL 10 MG: 10 TABLET ORAL at 08:11

## 2020-11-24 RX ADMIN — MEMANTINE 10 MG: 10 TABLET ORAL at 08:11

## 2020-11-24 RX ADMIN — METHOCARBAMOL TABLETS 500 MG: 500 TABLET, COATED ORAL at 08:11

## 2020-11-24 RX ADMIN — GUAIFENESIN 600 MG: 600 TABLET, EXTENDED RELEASE ORAL at 08:11

## 2020-11-24 RX ADMIN — PANTOPRAZOLE SODIUM 40 MG: 40 TABLET, DELAYED RELEASE ORAL at 05:11

## 2020-11-24 RX ADMIN — IPRATROPIUM BROMIDE AND ALBUTEROL SULFATE 3 ML: .5; 3 SOLUTION RESPIRATORY (INHALATION) at 07:11

## 2020-11-24 RX ADMIN — ENOXAPARIN SODIUM 40 MG: 40 INJECTION SUBCUTANEOUS at 08:11

## 2020-11-24 RX ADMIN — ASPIRIN 81 MG: 81 TABLET, COATED ORAL at 08:11

## 2020-11-24 RX ADMIN — DONEPEZIL HYDROCHLORIDE 10 MG: 5 TABLET, FILM COATED ORAL at 08:11

## 2020-11-24 RX ADMIN — Medication 10 ML: at 05:11

## 2020-11-24 RX ADMIN — METHOCARBAMOL TABLETS 500 MG: 500 TABLET, COATED ORAL at 02:11

## 2020-11-24 RX ADMIN — FUROSEMIDE 40 MG: 40 TABLET ORAL at 08:11

## 2020-11-24 RX ADMIN — MICONAZOLE NITRATE: 20 OINTMENT TOPICAL at 08:11

## 2020-11-24 RX ADMIN — DULOXETINE 60 MG: 30 CAPSULE, DELAYED RELEASE ORAL at 08:11

## 2020-11-24 RX ADMIN — METHOCARBAMOL TABLETS 500 MG: 500 TABLET, COATED ORAL at 05:11

## 2020-11-24 RX ADMIN — ACETAMINOPHEN 500 MG: 500 TABLET, FILM COATED ORAL at 08:11

## 2020-11-24 RX ADMIN — ENOXAPARIN SODIUM 40 MG: 40 INJECTION SUBCUTANEOUS at 09:11

## 2020-11-24 RX ADMIN — HYDROCODONE BITARTRATE AND ACETAMINOPHEN 1 TABLET: 10; 325 TABLET ORAL at 08:11

## 2020-11-24 RX ADMIN — VANCOMYCIN HYDROCHLORIDE 750 MG: 750 INJECTION, POWDER, LYOPHILIZED, FOR SOLUTION INTRAVENOUS at 08:11

## 2020-11-24 RX ADMIN — Medication 10 ML: at 11:11

## 2020-11-24 NOTE — PROGRESS NOTES
[facility]  Adult Nutrition  Progress Note    SUMMARY       Recommendations    Recommendation:   1. continue current cardiac diet   2. Continue to encourage adequate PO intake via meals   3. Boost pudding no longer needed to meet nutritional needs please discontinue  4. RD to follow.    Goals: Pt. will meet at least 75% ENN via meals by next RD follow up.  Nutrition Goal Status: goal met(goal continues)  Communication of RD Recs: (POC, sticky note)    Reason for Assessment    Reason For Assessment: RD follow-up  Diagnosis: (Debility)  Relevant Medical History: HTN, CAD, HLD, obesity  Interdisciplinary Rounds: attended    General Information Comments:   10/20:Pt admitted for SWING. She consumed 25-50% of meals yesterday and 0% so far today (very little bites). Per chart review, pt with a 42lb (12.5%) significant wt loss in 3 months , and Pt states she has lost 60lbs since june. NFPE performed today, mild wasting found in temples and orbital region; pt meets criteria for moderate malnutrition.      10/27: Pt states her appettie is good and that she is eating all of her + Boost Pudding, but flowsheets show Meal intake is fluctuating from 0-50%. On IV abx. Positive for diarrhea. Stage 2 wound to right heel. Spoke with MD about nutritional needs, states they will monitor.      11/3-Spoke to pt. Obtained food preferences. Pt. states that Boost Pudding causes stomach discomfort. Pt. States that Boost Plus causes diarrhea. Pt.s intake improving at 75%. Pt. States she doesn't eat 100% of meals due to dislike of vegetables.      11/10-Spoke with patient to obtain current PO intake. Patient states she is consuming 75% of her meals. Boost plus and Boost milkshake d/c due to causing diarhhea.  Patient's intake is adequate and states appetite is good.     11/17-Patient has recorded intake of % of meals and 75% ENN. Noted patient is maintaining weight. Patient states appetite is good and confirmed intake  "of meals/snacks    11/24: In the past 48 hrs pt has consumed either 50 or 100% of meals. 11lb non-significant weight loss noted since admit (3.7% in 1 month). Activity level is low while inpatient, pt is basically bed bound besides transferring to commode. Boost pudding not d/c'd by provider as recommended but pt cannot consume the product 2/2 causing stomach discomfort; supplement not needed to meet nutritional needs. NFPE reassessed today due to pt's prolonged stay; pt no longer meets 2 criteria for malnutrition dx per improved intake in the last month and evidence of 15.8% weight loss in 4 months which is partially hospital acquired.    Nutrition Discharge Planning: Heart healthy diet (low sodium and saturated fat)    Nutrition Risk Screen    Nutrition Risk Screen: no indicators present    Nutrition/Diet History    Food Preferences: pudding, ice cream, red beans, white beans, cornbread,  meat loaf, scalloped potatoes, baked potatoes  Spiritual, Cultural Beliefs, Confucianism Practices, Values that Affect Care: no  Food Allergies: NKFA  Factors Affecting Nutritional Intake: None identified at this time    Anthropometrics    Temp: 98.7 °F (37.1 °C)  Height Method: Stated  Height: 5' 10" (177.8 cm)  Height (inches): 70 in  Weight Method: Bed Scale  Weight: 128 kg (282 lb 3 oz)  Weight (lb): 282.19 lb  Ideal Body Weight (IBW), Female: 150 lb  % Ideal Body Weight, Female (lb): 195.47 %  BMI (Calculated): 40.5  BMI Grade: greater than 40 - morbid obesity  Usual Body Weight (UBW), kg: (!) 156.8 kg  Weight Change Amount: 53 lb(in 4 months)  % Usual Body Weight: 85  Weight Loss Since Admission: 11 lb  % Weight Change Since Admission: 3.9     Lab/Procedures/Meds    Pertinent Labs Reviewed: reviewed  Pertinent Labs Comments: eGFR 50  Pertinent Medications Reviewed: reviewed  Pertinent Medications Comments:  ferrous sulfate, enoxaparin, furosemide, isosorbide mononitrate, lisinopril, metoprolol succinate, pantoprazole, statin, " NS flush, abx    Estimated/Assessed Needs    Weight Used For Calorie Calculations: 128 kg (282 lb 3 oz)  Energy Calorie Requirements (kcal): 1870  Energy Need Method: Hammon-St Jeor(no AF)  Protein Requirements: 102-128 g(.8-1 g/kg)  Weight Used For Protein Calculations: 128 kg (282 lb 3 oz)     Estimated Fluid Requirement Method: RDA Method(or per MD)  RDA Method (mL): 1870     Nutrition Prescription Ordered    Current Diet Order: cardiac  Oral Nutrition Supplement: Boost pudding TID    Evaluation of Received Nutrient/Fluid Intake    I/O: -8.6 L since admit  Fluid Required: meeting needs  Comments: LBM 11/23  % Intake of Estimated Energy Needs: 75 - 100 %  % Meal Intake: 50 - 100 %    Nutrition Risk    Level of Risk/Frequency of Follow-up: low - moderate     Assessment and Plan  Nutrition Problem:  (Moderate) Protein-Calorie Malnutrition  Malnutrition in the context of Chronic Illness/Injury     Related to (etiology):  Inadequate oral intake     Signs and Symptoms (as evidenced by):  Energy Intake:  less than 75% for greater than or equal to 1 month(intake has improved and is no longer an issue)   Body Fat Depletion: mild depletion of orbitals   Muscle Mass Depletion: mild depletion of temples   Weight Loss: 15.8% weight loss in 4 months; 3.7% in 1 month     Interventions(treatment strategy):  Sodium modified diet  Collaboration with other providers     Nutrition Diagnosis Status:  Resolved    Monitor and Evaluation    Food and Nutrient Intake: energy intake, food and beverage intake  Food and Nutrient Adminstration: diet order  Physical Activity and Function: nutrition-related ADLs and IADLs  Anthropometric Measurements: weight, weight change, body mass index  Biochemical Data, Medical Tests and Procedures: electrolyte and renal panel  Nutrition-Focused Physical Findings: overall appearance     Malnutrition Assessment  Malnutrition Type: (pt no longer meets 2 ASPEN criteria for malnutrition dx)          Weight  Loss (Malnutrition): (15.8% weight loss in 4 months; 3.7% in 1 month)  Energy Intake (Malnutrition): less than 75% for greater than or equal to 1 month(intake has improved and is no longer an issue)   Orbital Region (Subcutaneous Fat Loss): mild depletion  Upper Arm Region (Subcutaneous Fat Loss): well nourished  Thoracic and Lumbar Region: well nourished   Christianity Region (Muscle Loss): mild depletion  Clavicle Bone Region (Muscle Loss): well nourished  Clavicle and Acromion Bone Region (Muscle Loss): well nourished  Scapular Bone Region (Muscle Loss): well nourished  Dorsal Hand (Muscle Loss): well nourished  Patellar Region (Muscle Loss): well nourished  Anterior Thigh Region (Muscle Loss): well nourished  Posterior Calf Region (Muscle Loss): well nourished   Nutrition Follow-Up    RD Follow-up?: Yes

## 2020-11-24 NOTE — PLAN OF CARE
Patient admitted as SWING, Assessment complete per flow sheet, AAOX4, RR even unlabored BBS diminished, on 2 L NC. Abdomen soft, non distended, with active bowel sounds x 4 quads.Tolerating diet well. PICC to RT arm SL, purple port does not flush, red port sluggish, Dsg C/D/I. Medications administered per MAR, tolerated well. HERMELINDA heels elevated off bed with pillows, safety precautions maintained, bed alarm on, free from falls/ injury, call bell in reach, instructed to call for needs, voiced understanding.agrees with plan of care.  Worked with PT/OT, see notes

## 2020-11-24 NOTE — PLAN OF CARE
pt no longer meets 2 ASPEN criteria for malnutrtion dx    Recommendation:   1. Continue current cardiac diet   2. Continue to encourage adequate PO intake via meals   3. Boost pudding no longer needed to meet nutritional needs please discontinue  4. RD to follow.    Goals: Pt. will meet at least 75% ENN via meals by next RD follow up.  Nutrition Goal Status: goal met(goal continues)    Interventions(treatment strategy):  Sodium modified diet  Collaboration with other providers    Nutrition Discharge Planning: Heart healthy diet (low sodium and saturated fat)

## 2020-11-24 NOTE — ASSESSMENT & PLAN NOTE
10/21/20: Nutrition Problem:  (Moderate) Protein-Calorie Malnutrition  Malnutrition in the context of Chronic Illness/Injury    Related to (etiology):  Inadequate oral intake    Signs and Symptoms (as evidenced by):  Energy Intake: <75% of estimated energy requirement for >/= 1 month  Body Fat Depletion: mild depletion of orbitals   Muscle Mass Depletion: mild depletion of temples   Weight Loss: 12.5%% x 3 months    Interventions(treatment strategy):  Sodium modified diet  Collaboration with other providers  Commercial food- Boost Pudding    Nutrition Diagnosis Status:  New    11/24/20:  Nutrition Problem:  (Moderate) Protein-Calorie Malnutrition  Malnutrition in the context of Chronic Illness/Injury     Related to (etiology):  Inadequate oral intake     Signs and Symptoms (as evidenced by):  Energy Intake:  less than 75% for greater than or equal to 1 month(intake has improved and is no longer an issue)   Body Fat Depletion: mild depletion of orbitals   Muscle Mass Depletion: mild depletion of temples   Weight Loss: 15.8% weight loss in 4 months; 3.7% in 1 month     Interventions(treatment strategy):  Sodium modified diet  Collaboration with other providers     Nutrition Diagnosis Status:  Resolved

## 2020-11-24 NOTE — PT/OT/SLP PROGRESS
"Physical Therapy Treatment    Patient Name:  Martina Betancourt   MRN:  [id number]    Recommendations:     Discharge Recommendations:  nursing facility, basic   Discharge Equipment Recommendations: hospital bed   Barriers to discharge: Decreased caregiver support    Assessment:     Martina Betancourt is a 70 y.o. female admitted with a medical diagnosis of Debility.  She presents with the following impairments/functional limitations:  weakness, gait instability, impaired functional mobilty, impaired endurance, impaired self care skills, pain, impaired balance, impaired cognition, decreased safety awareness. Patient performed sit to stand transfers using standard walker with lesser assistance needed today. Limited static standing tolerance due to lower back pain.    Rehab Prognosis: Fair; patient would benefit from acute skilled PT services to address these deficits and reach maximum level of function.    Recent Surgery: * No surgery found *      Plan:     During this hospitalization, patient to be seen daily to address the identified rehab impairments via gait training, therapeutic activities, therapeutic exercises and progress toward the following goals:    · Plan of Care Expires:  12/01/20    Subjective     Chief Complaint: Lower back pain and Right Knee pain  Patient/Family Comments/goals: "To decrease the pain and able to move better".  Pain/Comfort:  · Pain Rating 1: 8/10  · Location - Side 1: Right  · Location - Orientation 1: lower  · Location 1: back  · Pain Addressed 1: Reposition, Cessation of Activity  · Pain Rating Post-Intervention 1: 8/10  · Pain Rating 2: 7/10  · Location - Side 2: Right  · Location - Orientation 2: lower  · Location 2: knee  · Pain Addressed 2: Cessation of Activity  · Pain Rating Post-Intervention 2: 7/10      Objective:     Communicated with patient  prior to session.  Patient found supine with peripheral IV, PureWick, oxygen upon PT entry to room.     General Precautions: Standard, fall "   Orthopedic Precautions:N/A   Braces: N/A     Functional Mobility:  · Bed Mobility:     · Rolling Left:  supervision  · Rolling Right: supervision  · Scooting: supervision  · Supine to Sit: supervision  · Sit to Supine: moderate/minimum assistance and cues in ascending bilat LE  · Transfers:     · Sit to Stand:  minimum assistance with standard walker  · Balance: Standing with Std Walker Static: Fair- and tolerated for 1 min and 15 seconds(1st attempt) ; 45 seconds (2nd attempt)      AM-PAC 6 CLICK MOBILITY  Turning over in bed (including adjusting bedclothes, sheets and blankets)?: 3  Sitting down on and standing up from a chair with arms (e.g., wheelchair, bedside commode, etc.): 3  Moving from lying on back to sitting on the side of the bed?: 3  Moving to and from a bed to a chair (including a wheelchair)?: 3  Need to walk in hospital room?: 1  Climbing 3-5 steps with a railing?: 1  Basic Mobility Total Score: 14       Therapeutic Activities and Exercises:   Sit to stand transfer trng with std walker; Static stand balance and tolerance trng    Patient left supine with all lines intact, call button in reach, bed alarm on and nursing notified..    GOALS:   Multidisciplinary Problems     Physical Therapy Goals        Problem: Physical Therapy Goal    Goal Priority Disciplines Outcome Goal Variances Interventions   Physical Therapy Goal     PT, PT/OT Ongoing, Progressing     Description: Goals to be met by: 11/10/20    Patient will increase functional independence with mobility by performin. Rolling to sides using bed rail with contact guard assistance and cues  2. Supine to sit with minimal assistance and cues  3. Sit to supine with moderate assistance and cues  4. Tolerate Static Sit at side of the bed for 10 minutes with Standby Assistance  5. Bed to chair transfer with moderate assistance and cues using stand step TECHNIQUE  6. Lower extremity exercise program x15 reps per handout, with assistance as  needed                      Time Tracking:     PT Received On: 11/24/20  PT Start Time: 1353     PT Stop Time: 1410  PT Total Time (min): 17 min     Billable Minutes: Therapeutic Activity 17    Treatment Type: Treatment  PT/PTA: PT     PTA Visit Number: 0     Edgar Lamas, PT  11/24/2020

## 2020-11-24 NOTE — PT/OT/SLP PROGRESS
Occupational Therapy   Treatment    Name: Martina Betancourt  MRN: [id number]  Admitting Diagnosis:  Debility       Recommendations:     Discharge Recommendations: nursing facility, basic  Discharge Equipment Recommendations:  hospital bed  Barriers to discharge:       Assessment:     Martina Betancourt is a 70 y.o. female with a medical diagnosis of Debility.  She presents with improved independence with drop arm commode transfers with Min A. Nursing informed OT recommending ambulance transfer at discharge for safety.     Performance deficits affecting function are weakness, gait instability, decreased upper extremity function, impaired cardiopulmonary response to activity, impaired endurance, impaired balance, decreased lower extremity function, decreased safety awareness, impaired self care skills, impaired cognition, impaired functional mobilty.     Rehab Prognosis:  Fair; patient would benefit from acute skilled OT services to address these deficits and reach maximum level of function.       Plan:     Patient to be seen 5 x/week to address the above listed problems via self-care/home management, therapeutic activities, therapeutic exercises  · Plan of Care Expires: 11/26/20  · Plan of Care Reviewed with: patient    Subjective     Pain/Comfort:  · Pain Rating 1: 0/10    Objective:     Communicated with: Nursing prior to session.  Patient found supine with peripheral IV, oxygen, PureWick upon OT entry to room.    General Precautions: Standard, fall   Orthopedic Precautions:N/A   Braces: N/A     Occupational Performance:     Bed Mobility:    · Patient completed Rolling/Turning to Left with  modified independence  · Patient completed Rolling/Turning to Right with modified independence  · Patient completed Scooting/Bridging with modified independence  · Patient completed Supine to Sit with modified independence  · Patient completed Sit to Supine with minimum assistance     Functional Mobility/Transfers:  · Patient  completed Toilet Transfer Scoot Pivot technique with minimum assistance with  drop arm commode  · Functional Mobility: not completed    Activities of Daily Living:  · Toileting: stand by assistance to wipe bed level for urination, requires assistance with BM      [facility] 6 Click ADL: 19    Treatment & Education:  Therapist facilitated transfer training with improvement noted but poor endurance, patient able to transfer onto drop arm commode with SBA but required Min A to transfer back to bed due to fatigue and poor endurance    Patient left supine with all lines intact, call button in reach and nursing notifiedEducation:      GOALS:   Multidisciplinary Problems     Occupational Therapy Goals        Problem: Occupational Therapy Goal    Goal Priority Disciplines Outcome Interventions   Occupational Therapy Goal     OT, PT/OT Ongoing, Progressing    Description: Goals to be met by: 11/26/2020     Patient will increase functional independence with ADLs by performing:    Feeding with Supervision. (GOAL MET)  UE Dressing with Supervision (GOAL MET).  LE Dressing with Moderate Assistance (GOAL MET).  Grooming while seated with Supervision (GOAL MET).  Sitting at edge of bed x5 minutes with Supervision. (GOAL MET)  Rolling to Bilateral with Supervision. (GOAL MET)  Supine to sit with Minimal Assistance. (GOAL MET)  Upper extremity exercise program x10 reps per handout, with assistance as needed. (GOAL MET)    Squat pivot transfers with Minimal Assistance.  Toilet transfer to bedside commode with Minimal Assistance.  Toileting from bedside commode with Minimal Assistance for hygiene and clothing management.                    Time Tracking:     OT Date of Treatment: 11/24/20  OT Start Time: 1108  OT Stop Time: 1137  OT Total Time (min): 29 min    Billable Minutes:Self Care/Home Management 29 minutes    Golden Bartlett OT  11/24/2020

## 2020-11-25 LAB
ANION GAP SERPL CALC-SCNC: 11 MMOL/L (ref 8–16)
BUN SERPL-MCNC: 14 MG/DL (ref 8–23)
CALCIUM SERPL-MCNC: 10 MG/DL (ref 8.7–10.5)
CHLORIDE SERPL-SCNC: 99 MMOL/L (ref 95–110)
CO2 SERPL-SCNC: 28 MMOL/L (ref 23–29)
CREAT SERPL-MCNC: 1 MG/DL (ref 0.5–1.4)
EST. GFR  (AFRICAN AMERICAN): >60 ML/MIN/1.73 M^2
EST. GFR  (NON AFRICAN AMERICAN): 56 ML/MIN/1.73 M^2
GLUCOSE SERPL-MCNC: 86 MG/DL (ref 70–110)
POTASSIUM SERPL-SCNC: 3.7 MMOL/L (ref 3.5–5.1)
SODIUM SERPL-SCNC: 138 MMOL/L (ref 136–145)

## 2020-11-25 PROCEDURE — 97535 SELF CARE MNGMENT TRAINING: CPT

## 2020-11-25 PROCEDURE — 80048 BASIC METABOLIC PNL TOTAL CA: CPT

## 2020-11-25 PROCEDURE — 97530 THERAPEUTIC ACTIVITIES: CPT

## 2020-11-25 PROCEDURE — 25000003 PHARM REV CODE 250: Performed by: NURSE PRACTITIONER

## 2020-11-25 PROCEDURE — 99305 PR NURSING FACILITY CARE, INIT, MOD SEVERITY: ICD-10-PCS | Mod: ,,, | Performed by: INTERNAL MEDICINE

## 2020-11-25 PROCEDURE — 11000004 HC SNF PRIVATE

## 2020-11-25 PROCEDURE — 63600175 PHARM REV CODE 636 W HCPCS: Performed by: INTERNAL MEDICINE

## 2020-11-25 PROCEDURE — 94761 N-INVAS EAR/PLS OXIMETRY MLT: CPT

## 2020-11-25 PROCEDURE — 94799 UNLISTED PULMONARY SVC/PX: CPT

## 2020-11-25 PROCEDURE — 94640 AIRWAY INHALATION TREATMENT: CPT

## 2020-11-25 PROCEDURE — 25000003 PHARM REV CODE 250: Performed by: FAMILY MEDICINE

## 2020-11-25 PROCEDURE — 99305 1ST NF CARE MODERATE MDM 35: CPT | Mod: ,,, | Performed by: INTERNAL MEDICINE

## 2020-11-25 PROCEDURE — 27000221 HC OXYGEN, UP TO 24 HOURS

## 2020-11-25 PROCEDURE — 36415 COLL VENOUS BLD VENIPUNCTURE: CPT

## 2020-11-25 PROCEDURE — A4216 STERILE WATER/SALINE, 10 ML: HCPCS | Performed by: NURSE PRACTITIONER

## 2020-11-25 PROCEDURE — 25000242 PHARM REV CODE 250 ALT 637 W/ HCPCS: Performed by: NURSE PRACTITIONER

## 2020-11-25 PROCEDURE — 63600175 PHARM REV CODE 636 W HCPCS: Performed by: FAMILY MEDICINE

## 2020-11-25 RX ADMIN — FUROSEMIDE 40 MG: 40 TABLET ORAL at 08:11

## 2020-11-25 RX ADMIN — METHOCARBAMOL TABLETS 500 MG: 500 TABLET, COATED ORAL at 05:11

## 2020-11-25 RX ADMIN — IPRATROPIUM BROMIDE AND ALBUTEROL SULFATE 3 ML: .5; 3 SOLUTION RESPIRATORY (INHALATION) at 07:11

## 2020-11-25 RX ADMIN — ENOXAPARIN SODIUM 40 MG: 40 INJECTION SUBCUTANEOUS at 08:11

## 2020-11-25 RX ADMIN — IPRATROPIUM BROMIDE AND ALBUTEROL SULFATE 3 ML: .5; 3 SOLUTION RESPIRATORY (INHALATION) at 02:11

## 2020-11-25 RX ADMIN — Medication 10 ML: at 11:11

## 2020-11-25 RX ADMIN — Medication 10 ML: at 05:11

## 2020-11-25 RX ADMIN — ACETAMINOPHEN 500 MG: 500 TABLET, FILM COATED ORAL at 08:11

## 2020-11-25 RX ADMIN — ISOSORBIDE MONONITRATE 60 MG: 60 TABLET, EXTENDED RELEASE ORAL at 08:11

## 2020-11-25 RX ADMIN — METHOCARBAMOL TABLETS 500 MG: 500 TABLET, COATED ORAL at 01:11

## 2020-11-25 RX ADMIN — PANTOPRAZOLE SODIUM 40 MG: 40 TABLET, DELAYED RELEASE ORAL at 05:11

## 2020-11-25 RX ADMIN — GUAIFENESIN 600 MG: 600 TABLET, EXTENDED RELEASE ORAL at 08:11

## 2020-11-25 RX ADMIN — PRAVASTATIN SODIUM 40 MG: 40 TABLET ORAL at 08:11

## 2020-11-25 RX ADMIN — FERROUS SULFATE TAB 325 MG (65 MG ELEMENTAL FE) 325 MG: 325 (65 FE) TAB at 08:11

## 2020-11-25 RX ADMIN — VANCOMYCIN HYDROCHLORIDE 750 MG: 750 INJECTION, POWDER, LYOPHILIZED, FOR SOLUTION INTRAVENOUS at 08:11

## 2020-11-25 RX ADMIN — ASPIRIN 81 MG: 81 TABLET, COATED ORAL at 09:11

## 2020-11-25 RX ADMIN — MEMANTINE 10 MG: 10 TABLET ORAL at 09:11

## 2020-11-25 RX ADMIN — DONEPEZIL HYDROCHLORIDE 10 MG: 5 TABLET, FILM COATED ORAL at 08:11

## 2020-11-25 RX ADMIN — GUAIFENESIN 600 MG: 600 TABLET, EXTENDED RELEASE ORAL at 09:11

## 2020-11-25 RX ADMIN — MICONAZOLE NITRATE: 20 OINTMENT TOPICAL at 08:11

## 2020-11-25 RX ADMIN — METHOCARBAMOL TABLETS 500 MG: 500 TABLET, COATED ORAL at 08:11

## 2020-11-25 RX ADMIN — MICONAZOLE NITRATE: 20 OINTMENT TOPICAL at 09:11

## 2020-11-25 RX ADMIN — LISINOPRIL 10 MG: 10 TABLET ORAL at 08:11

## 2020-11-25 RX ADMIN — DULOXETINE 60 MG: 30 CAPSULE, DELAYED RELEASE ORAL at 08:11

## 2020-11-25 RX ADMIN — MEMANTINE 10 MG: 10 TABLET ORAL at 08:11

## 2020-11-25 RX ADMIN — HYDROCODONE BITARTRATE AND ACETAMINOPHEN 1 TABLET: 10; 325 TABLET ORAL at 09:11

## 2020-11-25 RX ADMIN — Medication 10 ML: at 01:11

## 2020-11-25 RX ADMIN — CLOPIDOGREL 75 MG: 75 TABLET, FILM COATED ORAL at 09:11

## 2020-11-25 RX ADMIN — METOPROLOL SUCCINATE 50 MG: 50 TABLET, EXTENDED RELEASE ORAL at 09:11

## 2020-11-25 RX ADMIN — ACETAMINOPHEN 500 MG: 500 TABLET, FILM COATED ORAL at 02:11

## 2020-11-25 NOTE — SUBJECTIVE & OBJECTIVE
Review of Systems   Constitutional: Positive for activity change and chills. Negative for fever.   Cardiovascular: Negative for chest pain.   Gastrointestinal: Negative for abdominal pain.   Musculoskeletal: Positive for back pain.   Neurological: Positive for weakness.   Psychiatric/Behavioral: Negative for confusion.        Frustrated with prolonged illness      Objective:     Vital Signs (Most Recent):  Temp: 97.7 °F (36.5 °C) (11/25/20 0707)  Pulse: 72 (11/25/20 0737)  Resp: 16 (11/25/20 0737)  BP: (!) 115/57 (11/25/20 0707)  SpO2: 97 % (11/25/20 0737) Vital Signs (24h Range):  Temp:  [97.7 °F (36.5 °C)-98.7 °F (37.1 °C)] 97.7 °F (36.5 °C)  Pulse:  [69-76] 72  Resp:  [16-20] 16  SpO2:  [93 %-97 %] 97 %  BP: (104-128)/(54-58) 115/57     Weight: 128.4 kg (283 lb 1.1 oz)  Body mass index is 40.62 kg/m².    Intake/Output Summary (Last 24 hours) at 11/25/2020 0758  Last data filed at 11/24/2020 1800  Gross per 24 hour   Intake 444 ml   Output 1100 ml   Net -656 ml      Physical Exam  Vitals signs and nursing note reviewed.   Constitutional:       General: She is not in acute distress.     Appearance: She is well-developed. She is obese.   HENT:      Head: Normocephalic and atraumatic.      Nose: Nose normal.      Mouth/Throat:      Mouth: Mucous membranes are moist.      Pharynx: Oropharynx is clear.   Eyes:      Extraocular Movements: Extraocular movements intact.      Conjunctiva/sclera: Conjunctivae normal.      Pupils: Pupils are equal, round, and reactive to light.   Neck:      Musculoskeletal: Neck supple.      Thyroid: No thyromegaly.   Cardiovascular:      Rate and Rhythm: Normal rate and regular rhythm.      Pulses: Normal pulses.      Heart sounds: No murmur. No friction rub. No gallop.    Pulmonary:      Effort: Pulmonary effort is normal. No respiratory distress.      Breath sounds: No wheezing, rhonchi or rales.   Abdominal:      General: Bowel sounds are normal.      Palpations: Abdomen is soft.    Musculoskeletal:      Right lower leg: No edema.      Left lower leg: No edema.   Lymphadenopathy:      Cervical: No cervical adenopathy.   Skin:     General: Skin is warm and dry.   Neurological:      General: No focal deficit present.      Mental Status: She is alert.      Cranial Nerves: No cranial nerve deficit.      Motor: Weakness present.      Gait: Gait abnormal.   Psychiatric:         Mood and Affect: Mood is depressed.         Behavior: Behavior normal.         Significant Labs: procal 0.04  BMP:   Recent Labs   Lab 11/25/20  0537   GLU 86      K 3.7   CL 99   CO2 28   BUN 14   CREATININE 1.0   CALCIUM 10.0   phos 3.7  Mag 1.9  Lab Results   Component Value Date    WBC 6.40 11/23/2020    HGB 10.2 (L) 11/23/2020    HCT 34.4 (L) 11/23/2020    MCV 92 11/23/2020     11/23/2020 11/20 >>16.2     11/20 sed rate 44    11/19 vanc trough 19.5. 11/21 17.4    MRI thoracic spine   1. Altered signal intensity changes at T9/T10 with evidence of erosive focus about the opposing endplates and evidence of enhancing phlegmonous formation identified in the central component of the disc with central low signal intensity compatible with osteomyelitis/discitis.  Vertical dimensions of the area of interest cause extreme erosion along the anterior portion of the vertebral bodies of interest without evidence of any significant paraspinal component.  2. No significant soft tissue component.  3. Chronic degenerative spondylosis changes of the remaining segments of the thoracic spine.  4. Chronic degenerative changes of the entirety of the cervical spine.

## 2020-11-25 NOTE — ASSESSMENT & PLAN NOTE
Stools d/c as diarrhea has improved. + heme occult . lovenox stopped cbc stable     11/20: patient still with significant immobility. H/H stable and no gross bleeding. Will resume lovenox

## 2020-11-25 NOTE — PT/OT/SLP PROGRESS
"Physical Therapy Treatment    Patient Name:  Martina Betancourt   MRN:  [id number]    Recommendations:     Discharge Recommendations:  nursing facility, basic   Discharge Equipment Recommendations: hospital bed   Barriers to discharge: Decreased caregiver support    Assessment:     Martina Betancourt is a 70 y.o. female admitted with a medical diagnosis of Debility.  She presents with the following impairments/functional limitations:  weakness, gait instability, impaired endurance, impaired self care skills, impaired functional mobilty, impaired balance, decreased lower extremity function, pain, impaired cognition, decreased safety awareness. Increased static stand tolerance at bedside using standard walker. Lesser assistance needed on sit to stand transfers using standard walker.    Rehab Prognosis: Fair; patient would benefit from acute skilled PT services to address these deficits and reach maximum level of function.    Recent Surgery: * No surgery found *      Plan:     During this hospitalization, patient to be seen daily to address the identified rehab impairments via gait training, therapeutic activities, therapeutic exercises and progress toward the following goals:    · Plan of Care Expires:  12/01/20    Subjective     Chief Complaint: lower back pain and spasm  Patient/Family Comments/goals: "To decrease pain and able to help myself better".  Pain/Comfort:  · Pain Rating 1: 8/10  · Location - Side 1: Right  · Location - Orientation 1: lower  · Location 1: back  · Pain Addressed 1: Reposition, Pre-medicate for activity, Cessation of Activity  · Pain Rating Post-Intervention 1: 8/10      Objective:     Communicated with patient  prior to session.  Patient found supine with peripheral IV, PureWick, oxygen upon PT entry to room.     General Precautions: Standard, fall   Orthopedic Precautions:N/A   Braces: N/A     Functional Mobility:  · Bed Mobility:     · Rolling Left:  modified independence and using " bedrail  · Rolling Right: modified independence and using bedrail  · Scooting: modified independence  · Supine to Sit: supervision  · Sit to Supine: minimal assistance and cues in ascending bilat LE  · Transfers:     · Sit to Stand:  minimal assistance and cues with standard walker  · Balance: Standing with std walker Static: Fair- and tolerated for 2 minutes and 10 seconds(1st attempt); 2nd attempt unable due to knees gave out.      AM-PAC 6 CLICK MOBILITY  Turning over in bed (including adjusting bedclothes, sheets and blankets)?: 3  Sitting down on and standing up from a chair with arms (e.g., wheelchair, bedside commode, etc.): 3  Moving from lying on back to sitting on the side of the bed?: 3  Moving to and from a bed to a chair (including a wheelchair)?: 3  Need to walk in hospital room?: 1  Climbing 3-5 steps with a railing?: 1  Basic Mobility Total Score: 14       Therapeutic Activities and Exercises:   Performed bed mobility ex, bilat LE isometric and strengthening ex x 10 reps on each at supine position and sitting position; Sit to stand transfers and static standing tolerance ex using Std Walker.    Patient left supine with all lines intact, call button in reach, bed alarm on and nursing notified..    GOALS:   Multidisciplinary Problems     Physical Therapy Goals        Problem: Physical Therapy Goal    Goal Priority Disciplines Outcome Goal Variances Interventions   Physical Therapy Goal     PT, PT/OT Ongoing, Progressing     Description: Goals to be met by: 11/10/20    Patient will increase functional independence with mobility by performin. Rolling to sides using bed rail with contact guard assistance and cues  2. Supine to sit with minimal assistance and cues  3. Sit to supine with moderate assistance and cues  4. Tolerate Static Sit at side of the bed for 10 minutes with Standby Assistance  5. Bed to chair transfer with moderate assistance and cues using stand step TECHNIQUE  6. Lower  extremity exercise program x15 reps per handout, with assistance as needed                      Time Tracking:     PT Received On: 11/25/20  PT Start Time: 1337     PT Stop Time: 1405  PT Total Time (min): 28 min     Billable Minutes: Therapeutic Activity 28    Treatment Type: Treatment  PT/PTA: PT     PTA Visit Number: 0     Edgar Lamas, PT  11/25/2020

## 2020-11-25 NOTE — PROGRESS NOTES
[facility] Medicine  Progress Note    Patient Name: Martina Betancourt  MRN: [id number]  Patient Class: IP- Swing   Admission Date: 10/20/2020  Length of Stay: 36 days  Attending Physician: Vamsi Manuel MD  Primary Care Provider: Joe Fuller Iii, MD        Subjective:     Principal Problem:Debility        HPI:  71yo female patient with hx of alzheiners, CAD, HLD, HTN, myopathy, MI, obesity, sleep apnea and OA (knees non ambulatory uses gonzalez round). She was living at home with her daughter. Recently admitted to [facility] with MRSA bacteremia and subsequent pneumonia treated with Zyvox x 7 days with clearance of her bacteremia now admitted with back pain found to have T9-10 osteo discitis, T8-10 epidural phlegmon with associated paraverterbral abscesses. Spine infection likely hematogenous from under treated bacteremia. Neurosurgery has been consulted. She has been on Vancomycin and Ceftriaxone. Underwent T9-10 disc space biopsy with IR on 10/16. Cultures negative, though had been on IV antibiotics multiple days prior. Afebrile. HDS. Repeat blood cx sterile. ID rec cont vanc 1750mg q 24 till 12/3.     Overview/Hospital Course:  10/23 Here for skilled ; needing IV abx for spinal abscess;  vanc 1750mg q 24 till 12/3  Afebrile , 3LNC - O2 sat 95%   + debility, very deconditioned; bilt LE x 10 reps followed by bed mobility trng and static sit balance and tolerance at side of the bed  C/o back pain with activity; Occupational therapist notes that she is very resistant to movement and c/o severe pain with minimal activity.   Will order toradol 15mg x 1dose IV; pt did much better after toradol rx today per OT. Will order daily prior to OT as tolerated  Monitor renal fx     10/26 here for skilled therapy and cont IV abc. Vanc 1750mg iv every 12hr. Noted elevated WBC this am and she report that she has had diarrhea for the last 4 days.   · PT reports Supine to Sit: maximal assistance, of 2  persons and patient able to reach with UE to push through rail and with improved push off with UE and initiation  · Sit to Supine: maximal assistance of 2 people with improved ability of patient to control descent of upper body  · Transfers:     · Sit to Stand:  moderate assistance, maximal assistance of 2 persons with no AD.  PT and PT tech blocking both knees while assisting pt to elevate hips from bed  Balance: pt able to stand x15 seconds EOB limited largely by knee and back pain.       10/28  She is still on vanc IV daily. No longer with diarrhea. Did have heme positive stools H/H stable on lovenox for DVT prophylaxis and plavix. Will monitor h/h M/Thurs. No fever. No elevated WBC  pt is really immobile.  · Bed Mobility:     · Rolling Left:  moderate assistance  · Rolling Right: moderate assistance  · Scooting: maximal assistance  · Supine to Sit: maximum/moderate assistace and cues  · Sit to Supine: maximum assistace x 1-2 and cues  · Transfers:     · Sit to Stand:  maximum assistance and cues inside the parallel bars with facilitation tech  · Bed to Chair: to wheelchair  with  maximum assistance and cues  using  Slide Board  · Balance: Standing Static inside the parallel bars with Poor grade. Tolerated for ~10 seconds with 2 attempts with  Maximum assistance and cues.  Cont PT daily  10/30  IV  vanc 1,000mg q 24hr x 8 weeks total till 12/3 per ID for discitis; random vanc 12.5 yesterday    No longer with diarrhea. Did have heme positive stools H/H stable on lovenox for DVT prophylaxis and plavix. H&H stable, lovenox stopped. No fever. No elevated WBC. She has productive cough this am. K+ 3.2 yesterday, getting lasix, will repeat KCL 40meq today   pt is really immobile.Standing with RW Static: Poor for ~15 seconds tolerance with max Assistance and cues  Has PICC line- one port clotted;       11/2 she remains on vanc 1000mg  q 24hr x 8 weeks total till 12/3 per ID for discitis. Vanc trough 15.8 10/31/20.  Vss/afebrile. intermittent back pain with prn pain meds helping. She is not doing much with PT. She uses gonzalez round at home but can transfer independently. Here she is max assist     11/4 Remains on vanc 1000mg  q 24hr x 8 weeks total till 12/3 per ID for discitis. Vanc trough 16.6 on 11/2   Afebrile , having regular BMs; getting Norco 10mg q 6   · Continues to require max assist; Sit to Stand:  Max/moderate assistance and cues with standard walker  Balance: Static Stand with Std Walker: Fair- with 2 mins and 7 seconds  tolerance and max/moderate assistance and cues  11/4 Remains on vanc 1000mg  q 24hr x 8 weeks total till 12/3 per ID for discitis. Vanc trough 16.6 on 11/2   Afebrile, WBC nml, creat stable , K+3.4  Requires maximal assist but finally standing with PT with walker .    11/9/20    Remains on vanc 1000mg  q 24hr x 8 weeks total till 12/3 per ID for discitis. VSS/afebrile. No elevated WBC. Last vanc trough 16.3 11/7  She is progressing with PT now min assist from sit to supine and mod assist sit to stand with RW,    11/11 remains on vanc 750mg IV every 24hr, dose monitored by pharm. Last vanc trough 16.5 11/9. Repeat MRI done Monday. Contact with neurosurgery sent to discuss comparison with original johny since she is mid way on IV abx and had no intervention. She has no comoplaints. VSS/labs reviewed. She is standing at bedside 1min and 15 sec with mod assist    11/13 remains on vanc 750mg IV every 24hr, dose monitored by pharm. Last vanc trough 14.8  11/11. Repeat MRI done Monday; mid tx as per ID recommendations;  messaged ID, shows severe Degenerative disease of spine   Able to stand now for over 1m with PT     11/16 remains on vanc every 24hr 750mg. Last vanc trough was 15.9 yesterday. Afebrile, no elevated WBC. She reports that she is feeling well. Getting stronger. Working with PT. Min assist with bed mobility and mod with standing.     11/18/20 planned for OP visit with ID this am   Remains on  "vanc every 24hr 750mg. Last vanc trough was 16.8  yesterday. Afebrile, no elevated WBC    11/20/20 Remains on vanc 1000mg  q 24hr x 8 weeks total till 12/3 per ID for discitis. VSS/afebrile. No elevated WBC. Last vanc trough 19.2 yesterday ;  Went to [facility] with ID 11/18; she reports they told her she still has "three pockets of infection"; however, the note is incomplete and does not provide further recommendations;message sent in Epic this am.   11/20 pt was able stand up and transfer  to Bedside commode for the first time today, also had a BM   ESR/CRP pending otherwise labs stable    11/23/20 pt remains on Vanc 750mg IV every 24hr till 12/3. ID saw patient last week and recommends continued IV abx through the [facility]. Neuro surgery has been set up for virtual visit this am. crp improved 139>16 sed rate 44. Labs and VSS. She is min to contact assist with getting up. She is not ambulating though. Case management working with patient and daughter about her d/c plan.     11/25/20 Remains on vanc 1000mg  q 24hr x 8 weeks total till 12/3 per ID for discitisID saw patient last week and recommends continued IV abx through the coarse. Vanc trough 16.3 yesteray; Had virtual visit with neurology yesterday   Standing with Std Walker Static: Fair- and tolerated for 1 min and 15 seconds(1st attempt) ; 45 seconds (2nd attempt)  Pt reports on virtual visit that she will need repeat MRI next week. Still waiting these notes and recs       Review of Systems   Constitutional: Positive for activity change and chills. Negative for fever.   Cardiovascular: Negative for chest pain.   Gastrointestinal: Negative for abdominal pain.   Musculoskeletal: Positive for back pain.   Neurological: Positive for weakness.   Psychiatric/Behavioral: Negative for confusion.        Frustrated with prolonged illness      Objective:     Vital Signs (Most Recent):  Temp: 97.7 °F (36.5 °C) (11/25/20 0707)  Pulse: 72 (11/25/20 0737)  Resp: 16 (11/25/20 " 0737)  BP: (!) 115/57 (11/25/20 0707)  SpO2: 97 % (11/25/20 0737) Vital Signs (24h Range):  Temp:  [97.7 °F (36.5 °C)-98.7 °F (37.1 °C)] 97.7 °F (36.5 °C)  Pulse:  [69-76] 72  Resp:  [16-20] 16  SpO2:  [93 %-97 %] 97 %  BP: (104-128)/(54-58) 115/57     Weight: 128.4 kg (283 lb 1.1 oz)  Body mass index is 40.62 kg/m².    Intake/Output Summary (Last 24 hours) at 11/25/2020 0758  Last data filed at 11/24/2020 1800  Gross per 24 hour   Intake 444 ml   Output 1100 ml   Net -656 ml      Physical Exam  Vitals signs and nursing note reviewed.   Constitutional:       General: She is not in acute distress.     Appearance: She is well-developed. She is obese.   HENT:      Head: Normocephalic and atraumatic.      Nose: Nose normal.      Mouth/Throat:      Mouth: Mucous membranes are moist.      Pharynx: Oropharynx is clear.   Eyes:      Extraocular Movements: Extraocular movements intact.      Conjunctiva/sclera: Conjunctivae normal.      Pupils: Pupils are equal, round, and reactive to light.   Neck:      Musculoskeletal: Neck supple.      Thyroid: No thyromegaly.   Cardiovascular:      Rate and Rhythm: Normal rate and regular rhythm.      Pulses: Normal pulses.      Heart sounds: No murmur. No friction rub. No gallop.    Pulmonary:      Effort: Pulmonary effort is normal. No respiratory distress.      Breath sounds: No wheezing, rhonchi or rales.   Abdominal:      General: Bowel sounds are normal.      Palpations: Abdomen is soft.   Musculoskeletal:      Right lower leg: No edema.      Left lower leg: No edema.   Lymphadenopathy:      Cervical: No cervical adenopathy.   Skin:     General: Skin is warm and dry.   Neurological:      General: No focal deficit present.      Mental Status: She is alert.      Cranial Nerves: No cranial nerve deficit.      Motor: Weakness present.      Gait: Gait abnormal.   Psychiatric:         Mood and Affect: Mood is depressed.         Behavior: Behavior normal.         Significant Labs: procal  0.04  BMP:   Recent Labs   Lab 11/25/20  0537   GLU 86      K 3.7   CL 99   CO2 28   BUN 14   CREATININE 1.0   CALCIUM 10.0   phos 3.7  Mag 1.9  Lab Results   Component Value Date    WBC 6.40 11/23/2020    HGB 10.2 (L) 11/23/2020    HCT 34.4 (L) 11/23/2020    MCV 92 11/23/2020     11/23/2020 11/20 >>16.2     11/20 sed rate 44    11/19 vanc trough 19.5. 11/21 17.4    MRI thoracic spine   1. Altered signal intensity changes at T9/T10 with evidence of erosive focus about the opposing endplates and evidence of enhancing phlegmonous formation identified in the central component of the disc with central low signal intensity compatible with osteomyelitis/discitis.  Vertical dimensions of the area of interest cause extreme erosion along the anterior portion of the vertebral bodies of interest without evidence of any significant paraspinal component.  2. No significant soft tissue component.  3. Chronic degenerative spondylosis changes of the remaining segments of the thoracic spine.  4. Chronic degenerative changes of the entirety of the cervical spine.      Assessment/Plan:      * Debility  Was walking with walker prior to pneumonia/bacteremia admit last month then after d.c was only w/c bound (gonzalez round) will add pt here and try and get her as strong as poss as she lives at home with family.   10/23 Chronic co back ache but also has recent high ankle fx ; per EDDIE Vinson NP Spoke with Ariela VASQUES who will see patient while on SWING. She looked over x rays and hx and reports that patient can weight bear as tolerated. Nurse/OT/MD notified    · 10/26 Supine to Sit: maximal assistance, of 2 persons and patient able to reach with UE to push through rail and with improved push off with UE and initiation  · Sit to Supine: maximal assistance of 2 people with improved ability of patient to control descent of upper body  · Transfers:     · Sit to Stand:  moderate assistance, maximal assistance of 2  persons with no AD.  PT and PT tech blocking both knees while assisting pt to elevate hips from bed  Balance: pt able to stand x15 seconds EOB limited largely by knee and back pain.     10/28.  · Bed Mobility:     · Rolling Left:  moderate assistance  · Rolling Right: moderate assistance  · Scooting: maximal assistance  · Supine to Sit: maximum/moderate assistace and cues  · Sit to Supine: maximum assistace x 1-2 and cues  · Transfers:     · Sit to Stand:  maximum assistance and cues inside the parallel bars with facilitation tech  · Bed to Chair: to wheelchair  with  maximum assistance and cues  using  Slide Board  · Balance: Standing Static inside the parallel bars with Poor grade. Tolerated for ~10 seconds with 2 attempts with  Maximum assistance and cues.  10/30 Standing with RW Static: Poor for ~15 seconds tolerance with max Assistance and cues  11/2  wheelchair maximum assistance x 2 and cues  with  slide board  using  slide/scoot tech  11/4 Sit to Stand:  Max/moderate assistance and cues with standard walker  Balance: Static Stand with Std Walker: Fair- with 2 mins and 7 seconds  tolerance and max/moderate assistance and cues  · 11/6 max/moderate assistance and cues  with standard walker  Balance: Standing Static with std walker: Poor+ for 1 minute and 15 seconds(1st attempt); 45 seconds(2nd attempt)    11/9 she is becoming stronger  · Cont with PT  · Rolling Left:  stand by assistance  · Rolling Right: stand by assistance  · Scooting: stand by assistance  · Supine to Sit: contact guard assistance  · Sit to Supine: minimum assistance  · Transfers:     Sit to Stand:  moderate assistance with rolling walker.3    11/13/20 Transfers:     · Sit to Stand:  Moderate assistance and cues with standard walker  Balance: Standing with Std Walker Static: Poor+ for  1 minute and  20 seconds(1st attempt); 35 seconds(2nd attempt). woth Moderate Assistance and cues    · 11/16 Scooting: minimum assistance  · Bridging: minimum  "assistance  · Supine to Sit: minimum assistance  · Sit to Supine: moderate assistance  · Transfers:     · Sit to Stand:  minimum assistance and moderate assistance with standard walker  Balance: Modified independent with static sitting at edge of bed, min assist with static standing with SW     11/18 - Sit to Stand:  moderate assiotance and cues with standard walker  · Balance: Standing with std walker Static: Fair for ~50 seconds tolerance(1st attempt) ; ~10 seconds tolerance(2nd attempt)  11/19 pt was able stand up and transfer  to Bedside commode for the first time today      · 11/23 Sit to Stand:  minimum assistance with rolling walker.  Balance: sitting good, standing Poor    Cough productive of purulent sputum  Productive cough this am per nurse- " pale green, grey"   Add mucinex and give neb tx q 6 while awake  No fever, WBC normal   Increase activity ..  Laying flat today. No complaints SOB./cough.  On 2L NC pox 92-94%.      Diarrhea  Stools d/c as diarrhea has improved. + heme occult . lovenox stopped cbc stable     11/20: patient still with significant immobility. H/H stable and no gross bleeding. Will resume lovenox      Malnutrition of mild degree  Dietary..  Boost.      Hydronephrosis  Seen on CT 10/7 - u/a was negative .  Urinating without issue  No flank pain  No signs of infection  No hematuria.      CAD (coronary artery disease)  Cont asa, plavix, lasix, isosorbide, lisinopril, toprol and statin      HTN (hypertension)  Increase lisinopril 2.5>10mg daily  10/23 SBP 160s at times; lisinopril just increased will give more time for lisinopril to work before titration  10/26 SBP 140s; cont to monitor  10/28 /80- increase lisinopril  10/30 BP much better 119/59- 138/62  .  11/2 /58.  VSS   11/9 /56- well controlled (cont lisinopril, lasix, isosorbide, metoprolol).  11/23 112/53 well controlled .    Discitis  vanc 1450mg IV every 24hr x 8 weeks total till 12/3 per ID ; will stay here " for the duration   PT  vanc now 1000mg IV every 24hr till 12/3 per ID last vanc trough 10/31 15.8    Reach out to ns today for guidance on re-imaging  Noted per ID Plan for 6 - 8 weeks of IV antibiotics with repeat MRI at midpoint of treatment as no surgical drainage undertaken.  - ID will follow.  She is currently midpoint; will plan for MRI of thoracic on Monday .  Cont vanc now 750mg IV daily with trough 11/7 16.3 .  11/13 remains max assist; can stand for 1m 20sec; awaiting ID assessment of MRI    11/11 contacted neurosurgery for repeat MRI review. She is only able to stand at bedside with therapy for 1min and 15 sec with mod to max assist. She would be a difficult transfer at this point.   11/16 will repeat message to neurosurgery as well as ID to eval patient maybe virtual visit. She would be a very difficult transfer for in person visit  11/18 no response from ID ; pt set up for [facility] for OP appnt with ID today   11/20 Still waiting on ID recs, multiple messages sent to neurosurgery; Georgina Morrison and Luis E Calabrese as well as ID; Neftali Rm. We are trying to follow discharge plan from acute stay with repeat MRI and ID follow up and awaiting recommendations. Cont current treatment pending recs    11/23 Dr juan discussed case with Dr Rm and recs to cont plan with vanc till 12/3.    Severe obesity (BMI >= 40)  Using boost as she has low appetite and low protein .      Obstructive sleep apnea treated with continuous positive airway pressure (CPAP)  continue home cpap.      Dementia without behavioral disturbance  Cont namenda and aricept       VTE Risk Mitigation (From admission, onward)         Ordered     enoxaparin injection 40 mg  Every 12 hours      11/21/20 0937                Discharge Planning   BRIT: 12/3/2020     Code Status: Prior   Is the patient medically ready for discharge?:     Reason for patient still in hospital (select all that apply): Treatment  Discharge Plan A: Home with  family, Home Health   Discharge Delays: None known at this time              Julio César Huntley MD  Department of Hospital Medicine   [facility]

## 2020-11-25 NOTE — ASSESSMENT & PLAN NOTE
Was walking with walker prior to pneumonia/bacteremia admit last month then after d.c was only w/c bound (gonzalez round) will add pt here and try and get her as strong as poss as she lives at home with family.   10/23 Chronic co back ache but also has recent high ankle fx ; per EDDIE Vinson NP Spoke with Ariela VASQUES who will see patient while on SWING. She looked over x rays and hx and reports that patient can weight bear as tolerated. Nurse/OT/MD notified    · 10/26 Supine to Sit: maximal assistance, of 2 persons and patient able to reach with UE to push through rail and with improved push off with UE and initiation  · Sit to Supine: maximal assistance of 2 people with improved ability of patient to control descent of upper body  · Transfers:     · Sit to Stand:  moderate assistance, maximal assistance of 2 persons with no AD.  PT and PT tech blocking both knees while assisting pt to elevate hips from bed  Balance: pt able to stand x15 seconds EOB limited largely by knee and back pain.     10/28.  · Bed Mobility:     · Rolling Left:  moderate assistance  · Rolling Right: moderate assistance  · Scooting: maximal assistance  · Supine to Sit: maximum/moderate assistace and cues  · Sit to Supine: maximum assistace x 1-2 and cues  · Transfers:     · Sit to Stand:  maximum assistance and cues inside the parallel bars with facilitation tech  · Bed to Chair: to wheelchair  with  maximum assistance and cues  using  Slide Board  · Balance: Standing Static inside the parallel bars with Poor grade. Tolerated for ~10 seconds with 2 attempts with  Maximum assistance and cues.  10/30 Standing with RW Static: Poor for ~15 seconds tolerance with max Assistance and cues  11/2  wheelchair maximum assistance x 2 and cues  with  slide board  using  slide/scoot tech  11/4 Sit to Stand:  Max/moderate assistance and cues with standard walker  Balance: Static Stand with Std Walker: Fair- with 2 mins and 7 seconds  tolerance and  max/moderate assistance and cues  · 11/6 max/moderate assistance and cues  with standard walker  Balance: Standing Static with std walker: Poor+ for 1 minute and 15 seconds(1st attempt); 45 seconds(2nd attempt)    11/9 she is becoming stronger  · Cont with PT  · Rolling Left:  stand by assistance  · Rolling Right: stand by assistance  · Scooting: stand by assistance  · Supine to Sit: contact guard assistance  · Sit to Supine: minimum assistance  · Transfers:     Sit to Stand:  moderate assistance with rolling walker.3    11/13/20 Transfers:     · Sit to Stand:  Moderate assistance and cues with standard walker  Balance: Standing with Std Walker Static: Poor+ for  1 minute and  20 seconds(1st attempt); 35 seconds(2nd attempt). woth Moderate Assistance and cues    · 11/16 Scooting: minimum assistance  · Bridging: minimum assistance  · Supine to Sit: minimum assistance  · Sit to Supine: moderate assistance  · Transfers:     · Sit to Stand:  minimum assistance and moderate assistance with standard walker  Balance: Modified independent with static sitting at edge of bed, min assist with static standing with SW     11/18 - Sit to Stand:  moderate assiotance and cues with standard walker  · Balance: Standing with std walker Static: Fair for ~50 seconds tolerance(1st attempt) ; ~10 seconds tolerance(2nd attempt)  11/19 pt was able stand up and transfer  to Bedside commode for the first time today      · 11/23 Sit to Stand:  minimum assistance with rolling walker.  Balance: sitting good, standing Poor

## 2020-11-25 NOTE — PT/OT/SLP PROGRESS
Occupational Therapy   Treatment    Name: Martina Betancourt  MRN: [id number]  Admitting Diagnosis:  Debility       Recommendations:     Discharge Recommendations: nursing facility, basic  Discharge Equipment Recommendations:  hospital bed, lift device  Barriers to discharge:       Assessment:     Martina Betancourt is a 70 y.o. female with a medical diagnosis of Debility.  She presents with good participation in treatment, remaining EOB with nursing aware for lunch. Performance deficits affecting function are weakness, gait instability, decreased upper extremity function, impaired cardiopulmonary response to activity, impaired endurance, impaired balance, decreased lower extremity function, decreased safety awareness, impaired cognition, impaired self care skills, impaired functional mobilty.     Rehab Prognosis:  Fair; patient would benefit from acute skilled OT services to address these deficits and reach maximum level of function.       Plan:     Patient to be seen 5 x/week to address the above listed problems via self-care/home management, therapeutic activities, therapeutic exercises  · Plan of Care Expires: 11/26/20  · Plan of Care Reviewed with: patient    Subjective     Pain/Comfort:  · Pain Rating 1: 0/10    Objective:     Communicated with: Nursing prior to session.  Patient found EOB with peripheral IV, PureWick, oxygen upon OT entry to room.    General Precautions: Standard, fall   Orthopedic Precautions:N/A   Braces: N/A     Occupational Performance:     Bed Mobility:    · Patient completed Rolling/Turning to Left with  modified independence  · Patient completed Scooting/Bridging with modified independence  · Patient completed Supine to Sit with modified independence       Activities of Daily Living:  · Feeding:  modified independence  sitting EOB       [facility] 6 Click ADL: 19    Treatment & Education:  Therapist facilitated bed mobility and sitting balance as well as feeding, encouraging patient to sit EOB and  press call button to return to supine, nursing informed    Patient left sitting EOB with all lines intact, call button in reach and nursing notifiedEducation:      GOALS:   Multidisciplinary Problems     Occupational Therapy Goals        Problem: Occupational Therapy Goal    Goal Priority Disciplines Outcome Interventions   Occupational Therapy Goal     OT, PT/OT Ongoing, Progressing    Description: Goals to be met by: 11/26/2020     Patient will increase functional independence with ADLs by performing:    Feeding with Supervision. (GOAL MET)  UE Dressing with Supervision (GOAL MET).  LE Dressing with Moderate Assistance (GOAL MET).  Grooming while seated with Supervision (GOAL MET).  Sitting at edge of bed x5 minutes with Supervision. (GOAL MET)  Rolling to Bilateral with Supervision. (GOAL MET)  Supine to sit with Minimal Assistance. (GOAL MET)  Upper extremity exercise program x10 reps per handout, with assistance as needed. (GOAL MET)    Squat pivot transfers with Minimal Assistance.  Toilet transfer to bedside commode with Minimal Assistance.  Toileting from bedside commode with Minimal Assistance for hygiene and clothing management.                    Time Tracking:     OT Date of Treatment: 11/25/20  OT Start Time: 1141  OT Stop Time: 1149  OT Total Time (min): 8 min    Billable Minutes:Self Care/Home Management 8 minutes    Golden Bartlett OT  11/25/2020

## 2020-11-25 NOTE — ASSESSMENT & PLAN NOTE
Increase lisinopril 2.5>10mg daily  10/23 SBP 160s at times; lisinopril just increased will give more time for lisinopril to work before titration  10/26 SBP 140s; cont to monitor  10/28 /80- increase lisinopril  10/30 BP much better 119/59- 138/62  .  11/2 /58.  VSS   11/9 /56- well controlled (cont lisinopril, lasix, isosorbide, metoprolol).  11/23 112/53 well controlled .

## 2020-11-25 NOTE — PLAN OF CARE
Pt remains on swing for antibiotic therapy for epidural abscesses. On Vancomycin. PICC line to right upper arm; red port intact and flushes easily; unable to flush purple port. Afebrile. WBC negative. Vitals stable. PT and OT consulted. Occasional complaints of pain; relieved with scheduled tylenol and prn hydrocodone. Encouraging pt to turn every 2 hours. External catheter in use. Remains free from injury/falls. Reviewed plan of care with pt; states agreement.

## 2020-11-25 NOTE — ASSESSMENT & PLAN NOTE
Seen on CT 10/7 - u/a was negative .  Urinating without issue  No flank pain  No signs of infection  No hematuria.

## 2020-11-25 NOTE — PROGRESS NOTES
Pharmacokinetic Assessment Follow Up: IV Vancomycin    Vancomycin serum concentration assessment(s):    The trough level was drawn correctly and can be used to guide therapy at this time. The measurement is within the desired definitive target range of 15 to 20 mcg/mL.    Vancomycin Regimen Plan:    Continue regimen to Vancomycin 750 mg IV every 24 hours with next serum trough concentration measured at 1900 prior to 3rd dose on 11/26    Drug levels (last 3 results):  Recent Labs   Lab Result Units 11/24/20  1851   Vancomycin-Trough ug/mL 16.3       Pharmacy will continue to follow and monitor vancomycin.    Please contact pharmacy at extension [phone] for questions regarding this assessment.    Thank you for the consult,   Dian Persaud       Patient brief summary:  Martina Betancourt is a 70 y.o. female initiated on antimicrobial therapy with IV Vancomycin for treatment of bone/joint infection    The patient's current regimen is 750 mg q24h    Drug Allergies:   Review of patient's allergies indicates:   Allergen Reactions    Hydroxyzine hcl     Tizanidine        Actual Body Weight:   128 kg    Renal Function:   Estimated Creatinine Clearance: 67.4 mL/min (based on SCr of 1.1 mg/dL).,     Dialysis Method (if applicable):  N/A    CBC (last 72 hours):  Recent Labs   Lab Result Units 11/23/20  0536   WBC K/uL 6.40   Hemoglobin g/dL 10.2*   Hematocrit % 34.4*   Platelets K/uL 321   Gran % % 53.0   Lymph % % 30.3   Mono % % 10.9   Eosinophil % % 5.0   Basophil % % 0.5   Differential Method  Automated       Metabolic Panel (last 72 hours):  Recent Labs   Lab Result Units 11/22/20  0614 11/23/20  0536 11/24/20  0543   Sodium mmol/L 139 139 139   Potassium mmol/L 4.2 4.1 4.1   Chloride mmol/L 100 99 100   CO2 mmol/L 29 31* 29   Glucose mg/dL 89 85 84   BUN mg/dL 15 14 14   Creatinine mg/dL 1.1 1.2 1.1   Magnesium mg/dL  --  2.3  --    Phosphorus mg/dL  --  3.5  --        Vancomycin Administrations:  vancomycin given in  the last 96 hours                     vancomycin 750 mg in dextrose 5 % 250 mL IVPB (ready to mix system) (mg) 750 mg New Bag 11/24/20 2022     750 mg New Bag 11/23/20 2031     750 mg New Bag 11/22/20 2013     750 mg New Bag 11/21/20 2004                    Microbiologic Results:  Microbiology Results (last 7 days)       ** No results found for the last 168 hours. **

## 2020-11-25 NOTE — ASSESSMENT & PLAN NOTE
"Productive cough this am per nurse- " pale green, grey"   Add mucinex and give neb tx q 6 while awake  No fever, WBC normal   Increase activity ..  Laying flat today. No complaints SOB./cough.  On 2L NC pox 92-94%.    "

## 2020-11-26 LAB
ANION GAP SERPL CALC-SCNC: 11 MMOL/L (ref 8–16)
BASOPHILS # BLD AUTO: 0.03 K/UL (ref 0–0.2)
BASOPHILS NFR BLD: 0.5 % (ref 0–1.9)
BUN SERPL-MCNC: 15 MG/DL (ref 8–23)
CALCIUM SERPL-MCNC: 9.7 MG/DL (ref 8.7–10.5)
CHLORIDE SERPL-SCNC: 98 MMOL/L (ref 95–110)
CO2 SERPL-SCNC: 28 MMOL/L (ref 23–29)
CREAT SERPL-MCNC: 1.2 MG/DL (ref 0.5–1.4)
DIFFERENTIAL METHOD: ABNORMAL
EOSINOPHIL # BLD AUTO: 0.3 K/UL (ref 0–0.5)
EOSINOPHIL NFR BLD: 4.8 % (ref 0–8)
ERYTHROCYTE [DISTWIDTH] IN BLOOD BY AUTOMATED COUNT: 16.6 % (ref 11.5–14.5)
EST. GFR  (AFRICAN AMERICAN): 52 ML/MIN/1.73 M^2
EST. GFR  (NON AFRICAN AMERICAN): 45 ML/MIN/1.73 M^2
GLUCOSE SERPL-MCNC: 93 MG/DL (ref 70–110)
HCT VFR BLD AUTO: 33.7 % (ref 37–48.5)
HGB BLD-MCNC: 10 G/DL (ref 12–16)
IMM GRANULOCYTES # BLD AUTO: 0.03 K/UL (ref 0–0.04)
IMM GRANULOCYTES NFR BLD AUTO: 0.5 % (ref 0–0.5)
LYMPHOCYTES # BLD AUTO: 2 K/UL (ref 1–4.8)
LYMPHOCYTES NFR BLD: 33.3 % (ref 18–48)
MAGNESIUM SERPL-MCNC: 2 MG/DL (ref 1.6–2.6)
MCH RBC QN AUTO: 27 PG (ref 27–31)
MCHC RBC AUTO-ENTMCNC: 29.7 G/DL (ref 32–36)
MCV RBC AUTO: 91 FL (ref 82–98)
MONOCYTES # BLD AUTO: 0.8 K/UL (ref 0.3–1)
MONOCYTES NFR BLD: 13.2 % (ref 4–15)
NEUTROPHILS # BLD AUTO: 2.8 K/UL (ref 1.8–7.7)
NEUTROPHILS NFR BLD: 47.7 % (ref 38–73)
NRBC BLD-RTO: 0 /100 WBC
PHOSPHATE SERPL-MCNC: 4.1 MG/DL (ref 2.7–4.5)
PLATELET # BLD AUTO: 313 K/UL (ref 150–350)
PMV BLD AUTO: 9.7 FL (ref 9.2–12.9)
POTASSIUM SERPL-SCNC: 3.8 MMOL/L (ref 3.5–5.1)
RBC # BLD AUTO: 3.7 M/UL (ref 4–5.4)
SODIUM SERPL-SCNC: 137 MMOL/L (ref 136–145)
VANCOMYCIN TROUGH SERPL-MCNC: 16.2 UG/ML (ref 10–22)
WBC # BLD AUTO: 5.85 K/UL (ref 3.9–12.7)

## 2020-11-26 PROCEDURE — A4216 STERILE WATER/SALINE, 10 ML: HCPCS | Performed by: NURSE PRACTITIONER

## 2020-11-26 PROCEDURE — 94761 N-INVAS EAR/PLS OXIMETRY MLT: CPT

## 2020-11-26 PROCEDURE — 25000003 PHARM REV CODE 250: Performed by: FAMILY MEDICINE

## 2020-11-26 PROCEDURE — 94799 UNLISTED PULMONARY SVC/PX: CPT

## 2020-11-26 PROCEDURE — 80202 ASSAY OF VANCOMYCIN: CPT

## 2020-11-26 PROCEDURE — 25000003 PHARM REV CODE 250: Performed by: NURSE PRACTITIONER

## 2020-11-26 PROCEDURE — 84100 ASSAY OF PHOSPHORUS: CPT

## 2020-11-26 PROCEDURE — 27000221 HC OXYGEN, UP TO 24 HOURS

## 2020-11-26 PROCEDURE — 80048 BASIC METABOLIC PNL TOTAL CA: CPT

## 2020-11-26 PROCEDURE — 63600175 PHARM REV CODE 636 W HCPCS: Performed by: FAMILY MEDICINE

## 2020-11-26 PROCEDURE — 94640 AIRWAY INHALATION TREATMENT: CPT

## 2020-11-26 PROCEDURE — 25000242 PHARM REV CODE 250 ALT 637 W/ HCPCS: Performed by: NURSE PRACTITIONER

## 2020-11-26 PROCEDURE — 83735 ASSAY OF MAGNESIUM: CPT

## 2020-11-26 PROCEDURE — 63600175 PHARM REV CODE 636 W HCPCS: Performed by: INTERNAL MEDICINE

## 2020-11-26 PROCEDURE — 11000004 HC SNF PRIVATE

## 2020-11-26 PROCEDURE — 85025 COMPLETE CBC W/AUTO DIFF WBC: CPT

## 2020-11-26 PROCEDURE — 36415 COLL VENOUS BLD VENIPUNCTURE: CPT

## 2020-11-26 PROCEDURE — 97110 THERAPEUTIC EXERCISES: CPT

## 2020-11-26 RX ADMIN — Medication 10 ML: at 04:11

## 2020-11-26 RX ADMIN — METOPROLOL SUCCINATE 50 MG: 50 TABLET, EXTENDED RELEASE ORAL at 08:11

## 2020-11-26 RX ADMIN — HYDROCODONE BITARTRATE AND ACETAMINOPHEN 1 TABLET: 10; 325 TABLET ORAL at 08:11

## 2020-11-26 RX ADMIN — HYDROCODONE BITARTRATE AND ACETAMINOPHEN 1 TABLET: 10; 325 TABLET ORAL at 09:11

## 2020-11-26 RX ADMIN — ASPIRIN 81 MG: 81 TABLET, COATED ORAL at 08:11

## 2020-11-26 RX ADMIN — ACETAMINOPHEN 500 MG: 500 TABLET, FILM COATED ORAL at 03:11

## 2020-11-26 RX ADMIN — VANCOMYCIN HYDROCHLORIDE 750 MG: 750 INJECTION, POWDER, LYOPHILIZED, FOR SOLUTION INTRAVENOUS at 07:11

## 2020-11-26 RX ADMIN — MICONAZOLE NITRATE: 20 OINTMENT TOPICAL at 08:11

## 2020-11-26 RX ADMIN — DULOXETINE 60 MG: 30 CAPSULE, DELAYED RELEASE ORAL at 08:11

## 2020-11-26 RX ADMIN — MEMANTINE 10 MG: 10 TABLET ORAL at 08:11

## 2020-11-26 RX ADMIN — ONDANSETRON 4 MG: 4 TABLET, ORALLY DISINTEGRATING ORAL at 09:11

## 2020-11-26 RX ADMIN — IPRATROPIUM BROMIDE AND ALBUTEROL SULFATE 3 ML: .5; 3 SOLUTION RESPIRATORY (INHALATION) at 01:11

## 2020-11-26 RX ADMIN — METHOCARBAMOL TABLETS 500 MG: 500 TABLET, COATED ORAL at 01:11

## 2020-11-26 RX ADMIN — MICONAZOLE NITRATE: 20 OINTMENT TOPICAL at 09:11

## 2020-11-26 RX ADMIN — PRAVASTATIN SODIUM 40 MG: 40 TABLET ORAL at 09:11

## 2020-11-26 RX ADMIN — ENOXAPARIN SODIUM 40 MG: 40 INJECTION SUBCUTANEOUS at 09:11

## 2020-11-26 RX ADMIN — FERROUS SULFATE TAB 325 MG (65 MG ELEMENTAL FE) 325 MG: 325 (65 FE) TAB at 09:11

## 2020-11-26 RX ADMIN — Medication 10 ML: at 10:11

## 2020-11-26 RX ADMIN — ALUMINUM HYDROXIDE, MAGNESIUM HYDROXIDE, AND DIMETHICONE 30 ML: 400; 400; 40 SUSPENSION ORAL at 07:11

## 2020-11-26 RX ADMIN — METHOCARBAMOL TABLETS 500 MG: 500 TABLET, COATED ORAL at 09:11

## 2020-11-26 RX ADMIN — FUROSEMIDE 40 MG: 40 TABLET ORAL at 08:11

## 2020-11-26 RX ADMIN — DONEPEZIL HYDROCHLORIDE 10 MG: 5 TABLET, FILM COATED ORAL at 09:11

## 2020-11-26 RX ADMIN — METHOCARBAMOL TABLETS 500 MG: 500 TABLET, COATED ORAL at 05:11

## 2020-11-26 RX ADMIN — LISINOPRIL 10 MG: 10 TABLET ORAL at 08:11

## 2020-11-26 RX ADMIN — IPRATROPIUM BROMIDE AND ALBUTEROL SULFATE 3 ML: .5; 3 SOLUTION RESPIRATORY (INHALATION) at 07:11

## 2020-11-26 RX ADMIN — ACETAMINOPHEN 500 MG: 500 TABLET, FILM COATED ORAL at 09:11

## 2020-11-26 RX ADMIN — METHOCARBAMOL TABLETS 500 MG: 500 TABLET, COATED ORAL at 08:11

## 2020-11-26 RX ADMIN — GUAIFENESIN 600 MG: 600 TABLET, EXTENDED RELEASE ORAL at 08:11

## 2020-11-26 RX ADMIN — GUAIFENESIN 600 MG: 600 TABLET, EXTENDED RELEASE ORAL at 09:11

## 2020-11-26 RX ADMIN — ENOXAPARIN SODIUM 40 MG: 40 INJECTION SUBCUTANEOUS at 08:11

## 2020-11-26 RX ADMIN — PANTOPRAZOLE SODIUM 40 MG: 40 TABLET, DELAYED RELEASE ORAL at 05:11

## 2020-11-26 RX ADMIN — CLOPIDOGREL 75 MG: 75 TABLET, FILM COATED ORAL at 08:11

## 2020-11-26 RX ADMIN — Medication 10 ML: at 05:11

## 2020-11-26 RX ADMIN — ISOSORBIDE MONONITRATE 60 MG: 60 TABLET, EXTENDED RELEASE ORAL at 08:11

## 2020-11-26 RX ADMIN — Medication 10 ML: at 01:11

## 2020-11-26 RX ADMIN — MEMANTINE 10 MG: 10 TABLET ORAL at 09:11

## 2020-11-26 NOTE — PT/OT/SLP PROGRESS
Physical Therapy Treatment    Patient Name:  Martina Betancourt   MRN:  [id number]    Recommendations:     Discharge Recommendations:  nursing facility, basic   Discharge Equipment Recommendations: hospital bed   Barriers to discharge: Decreased caregiver support    Assessment:     Martina Betancourt is a 70 y.o. female admitted with a medical diagnosis of Debility.  She presents with the following impairments/functional limitations:  weakness, gait instability, impaired self care skills, impaired functional mobilty, decreased lower extremity function, impaired cognition, decreased safety awareness. Pt complaining of fatigue and generalized pain. Pt reports of not being able to sleep last night and just receiving pain meds. Pt requesting to rest today but agreeing to try to do bed activity. Pt able to tolerate LE exercises but pt falling asleep during activity limiting therapy session today.    Rehab Prognosis: Fair; patient would benefit from acute skilled PT services to address these deficits and reach maximum level of function.    Recent Surgery: * No surgery found *      Plan:     During this hospitalization, patient to be seen daily to address the identified rehab impairments via gait training, therapeutic activities, therapeutic exercises and progress toward the following goals:    · Plan of Care Expires:  12/01/20    Subjective     Chief Complaint: generalized pain  Patient/Family Comments/goals: pt wanting to sleep  Pain/Comfort:  · Pain Rating 1: 8/10  · Location - Orientation 1: generalized  · Pain Addressed 1: Reposition, Cessation of Activity, Pre-medicate for activity  · Pain Rating Post-Intervention 1: 8/10      Objective:     Communicated with patient and nurisng prior to session.  Patient found supine with peripheral IV, PureWick upon PT entry to room.     General Precautions: Standard, fall   Orthopedic Precautions:N/A   Braces: N/A     Functional Mobility:  · Bed Mobility:     · Rolling Left:  minimum  assistance  · Rolling Right: minimum assistance      AM-PAC 6 CLICK MOBILITY          Therapeutic Activities and Exercises:   Pt performed B LE exercises consisting of active range of motion exercises x 10 reps consisting of Ankle DF, Ankle PF, Quad Sets, Heel slides, ABD/ADD, LAQ with pt able to tolerate activities with frequent breaks to verbally redirect pt and keep her awake.      Patient left supine with all lines intact and call button in reach..    GOALS:   Multidisciplinary Problems     Physical Therapy Goals        Problem: Physical Therapy Goal    Goal Priority Disciplines Outcome Goal Variances Interventions   Physical Therapy Goal     PT, PT/OT Ongoing, Progressing     Description: Goals to be met by: 11/10/20    Patient will increase functional independence with mobility by performin. Rolling to sides using bed rail with contact guard assistance and cues  2. Supine to sit with minimal assistance and cues  3. Sit to supine with moderate assistance and cues  4. Tolerate Static Sit at side of the bed for 10 minutes with Standby Assistance  5. Bed to chair transfer with moderate assistance and cues using stand step TECHNIQUE  6. Lower extremity exercise program x15 reps per handout, with assistance as needed                      Time Tracking:     PT Received On: 20  PT Start Time: 920     PT Stop Time: 930  PT Total Time (min): 10 min     Billable Minutes: Therapeutic Exercise 10    Treatment Type: Treatment        PTA Visit Number: 0     Deangelo Stapleton, PT  2020

## 2020-11-26 NOTE — PLAN OF CARE
Patient Agrees and Compliant with Plan of Care: Swing Patient.  Monitor Breathing Pattern; Bilateral Breath sounds auscultated posteriorly and diminished throughout. Oxygen saturation 94-97% on oxygen 2L nc. No c/o shortness of breath.  Maintain Pain Regimen; Note Patient received scheduled Tylenol this shift. No request for other pain medication.  Maintain PICC in upper right arm; Note red port flushes well ; Unable to flush other port.  Monitor Vital Signs; B/P 104/51 .  Administer IV Antibiotic as ordered; Patient received Vancomycin 750mg as ordered.  Monitor Vancomycin Trough; Next trough to be drawn on 11-26-20 at 1900.  Fall Precautions; Maintain Patient Safety; Bed Alarm in use; No falls or injury noted this shift.

## 2020-11-26 NOTE — PLAN OF CARE
Pt remains on swing for vancomycin for epidural abscesses. PICC line to right upper arm; red port flushes easily; unable to flush purple port. Occasional complaints of back pain; relieved with scheduled tylenol and PRN norco. Afebrile. WBC negative. Vitals stable. Maintaining oxygen sats mid 90s on 2 liters via nasal cannula. PT and OT consulted. Tolerating diet. Remains free from injury/falls. Plan of care reviewed with pt; states agreement.

## 2020-11-27 PROBLEM — R05.8 COUGH PRODUCTIVE OF PURULENT SPUTUM: Status: RESOLVED | Noted: 2020-10-30 | Resolved: 2020-11-27

## 2020-11-27 LAB
ANION GAP SERPL CALC-SCNC: 10 MMOL/L (ref 8–16)
BUN SERPL-MCNC: 16 MG/DL (ref 8–23)
CALCIUM SERPL-MCNC: 9.6 MG/DL (ref 8.7–10.5)
CHLORIDE SERPL-SCNC: 100 MMOL/L (ref 95–110)
CO2 SERPL-SCNC: 28 MMOL/L (ref 23–29)
CREAT SERPL-MCNC: 1.2 MG/DL (ref 0.5–1.4)
EST. GFR  (AFRICAN AMERICAN): 52 ML/MIN/1.73 M^2
EST. GFR  (NON AFRICAN AMERICAN): 45 ML/MIN/1.73 M^2
GLUCOSE SERPL-MCNC: 89 MG/DL (ref 70–110)
POTASSIUM SERPL-SCNC: 3.7 MMOL/L (ref 3.5–5.1)
SODIUM SERPL-SCNC: 138 MMOL/L (ref 136–145)

## 2020-11-27 PROCEDURE — 25000003 PHARM REV CODE 250: Performed by: NURSE PRACTITIONER

## 2020-11-27 PROCEDURE — 63600175 PHARM REV CODE 636 W HCPCS: Performed by: INTERNAL MEDICINE

## 2020-11-27 PROCEDURE — 25000242 PHARM REV CODE 250 ALT 637 W/ HCPCS: Performed by: NURSE PRACTITIONER

## 2020-11-27 PROCEDURE — 94761 N-INVAS EAR/PLS OXIMETRY MLT: CPT

## 2020-11-27 PROCEDURE — 99309 SBSQ NF CARE MODERATE MDM 30: CPT | Mod: ,,, | Performed by: FAMILY MEDICINE

## 2020-11-27 PROCEDURE — 36415 COLL VENOUS BLD VENIPUNCTURE: CPT

## 2020-11-27 PROCEDURE — 25000242 PHARM REV CODE 250 ALT 637 W/ HCPCS: Performed by: FAMILY MEDICINE

## 2020-11-27 PROCEDURE — A4216 STERILE WATER/SALINE, 10 ML: HCPCS | Performed by: NURSE PRACTITIONER

## 2020-11-27 PROCEDURE — 94640 AIRWAY INHALATION TREATMENT: CPT

## 2020-11-27 PROCEDURE — 80048 BASIC METABOLIC PNL TOTAL CA: CPT

## 2020-11-27 PROCEDURE — 94799 UNLISTED PULMONARY SVC/PX: CPT

## 2020-11-27 PROCEDURE — 27000221 HC OXYGEN, UP TO 24 HOURS

## 2020-11-27 PROCEDURE — 11000004 HC SNF PRIVATE

## 2020-11-27 PROCEDURE — 97530 THERAPEUTIC ACTIVITIES: CPT

## 2020-11-27 PROCEDURE — 97110 THERAPEUTIC EXERCISES: CPT

## 2020-11-27 PROCEDURE — 99309 PR NURSING FAC CARE, SUBSEQ, SIGNIF COMPLIC: ICD-10-PCS | Mod: ,,, | Performed by: FAMILY MEDICINE

## 2020-11-27 PROCEDURE — 63600175 PHARM REV CODE 636 W HCPCS: Performed by: FAMILY MEDICINE

## 2020-11-27 PROCEDURE — 25000003 PHARM REV CODE 250: Performed by: FAMILY MEDICINE

## 2020-11-27 RX ORDER — IPRATROPIUM BROMIDE AND ALBUTEROL SULFATE 2.5; .5 MG/3ML; MG/3ML
3 SOLUTION RESPIRATORY (INHALATION) EVERY 6 HOURS PRN
Status: DISCONTINUED | OUTPATIENT
Start: 2020-11-27 | End: 2020-12-01 | Stop reason: HOSPADM

## 2020-11-27 RX ADMIN — METHOCARBAMOL TABLETS 500 MG: 500 TABLET, COATED ORAL at 04:11

## 2020-11-27 RX ADMIN — BISACODYL 5 MG: 5 TABLET, COATED ORAL at 01:11

## 2020-11-27 RX ADMIN — IPRATROPIUM BROMIDE AND ALBUTEROL SULFATE 3 ML: .5; 3 SOLUTION RESPIRATORY (INHALATION) at 07:11

## 2020-11-27 RX ADMIN — PANTOPRAZOLE SODIUM 40 MG: 40 TABLET, DELAYED RELEASE ORAL at 05:11

## 2020-11-27 RX ADMIN — CALCIUM CARBONATE (ANTACID) CHEW TAB 500 MG 500 MG: 500 CHEW TAB at 08:11

## 2020-11-27 RX ADMIN — MICONAZOLE NITRATE: 20 OINTMENT TOPICAL at 08:11

## 2020-11-27 RX ADMIN — ACETAMINOPHEN 500 MG: 500 TABLET, FILM COATED ORAL at 09:11

## 2020-11-27 RX ADMIN — FUROSEMIDE 40 MG: 40 TABLET ORAL at 09:11

## 2020-11-27 RX ADMIN — LISINOPRIL 10 MG: 10 TABLET ORAL at 09:11

## 2020-11-27 RX ADMIN — CLOPIDOGREL 75 MG: 75 TABLET, FILM COATED ORAL at 09:11

## 2020-11-27 RX ADMIN — ACETAMINOPHEN 500 MG: 500 TABLET, FILM COATED ORAL at 08:11

## 2020-11-27 RX ADMIN — DONEPEZIL HYDROCHLORIDE 10 MG: 5 TABLET, FILM COATED ORAL at 08:11

## 2020-11-27 RX ADMIN — MEMANTINE 10 MG: 10 TABLET ORAL at 08:11

## 2020-11-27 RX ADMIN — IPRATROPIUM BROMIDE AND ALBUTEROL SULFATE 3 ML: .5; 3 SOLUTION RESPIRATORY (INHALATION) at 02:11

## 2020-11-27 RX ADMIN — Medication 10 ML: at 01:11

## 2020-11-27 RX ADMIN — FERROUS SULFATE TAB 325 MG (65 MG ELEMENTAL FE) 325 MG: 325 (65 FE) TAB at 08:11

## 2020-11-27 RX ADMIN — METOPROLOL SUCCINATE 50 MG: 50 TABLET, EXTENDED RELEASE ORAL at 09:11

## 2020-11-27 RX ADMIN — Medication 10 ML: at 12:11

## 2020-11-27 RX ADMIN — Medication 10 ML: at 07:11

## 2020-11-27 RX ADMIN — METHOCARBAMOL TABLETS 500 MG: 500 TABLET, COATED ORAL at 09:11

## 2020-11-27 RX ADMIN — METHOCARBAMOL TABLETS 500 MG: 500 TABLET, COATED ORAL at 01:11

## 2020-11-27 RX ADMIN — MEMANTINE 10 MG: 10 TABLET ORAL at 09:11

## 2020-11-27 RX ADMIN — ISOSORBIDE MONONITRATE 60 MG: 60 TABLET, EXTENDED RELEASE ORAL at 09:11

## 2020-11-27 RX ADMIN — ACETAMINOPHEN 500 MG: 500 TABLET, FILM COATED ORAL at 03:11

## 2020-11-27 RX ADMIN — VANCOMYCIN HYDROCHLORIDE 750 MG: 750 INJECTION, POWDER, LYOPHILIZED, FOR SOLUTION INTRAVENOUS at 07:11

## 2020-11-27 RX ADMIN — ASPIRIN 81 MG: 81 TABLET, COATED ORAL at 09:11

## 2020-11-27 RX ADMIN — ALUMINUM HYDROXIDE, MAGNESIUM HYDROXIDE, AND DIMETHICONE 30 ML: 400; 400; 40 SUSPENSION ORAL at 04:11

## 2020-11-27 RX ADMIN — METHOCARBAMOL TABLETS 500 MG: 500 TABLET, COATED ORAL at 08:11

## 2020-11-27 RX ADMIN — GUAIFENESIN 600 MG: 600 TABLET, EXTENDED RELEASE ORAL at 08:11

## 2020-11-27 RX ADMIN — PRAVASTATIN SODIUM 40 MG: 40 TABLET ORAL at 08:11

## 2020-11-27 RX ADMIN — MICONAZOLE NITRATE: 20 OINTMENT TOPICAL at 09:11

## 2020-11-27 RX ADMIN — DULOXETINE 60 MG: 30 CAPSULE, DELAYED RELEASE ORAL at 09:11

## 2020-11-27 RX ADMIN — ENOXAPARIN SODIUM 40 MG: 40 INJECTION SUBCUTANEOUS at 08:11

## 2020-11-27 RX ADMIN — GUAIFENESIN 600 MG: 600 TABLET, EXTENDED RELEASE ORAL at 09:11

## 2020-11-27 RX ADMIN — ENOXAPARIN SODIUM 40 MG: 40 INJECTION SUBCUTANEOUS at 09:11

## 2020-11-27 RX ADMIN — Medication 10 ML: at 11:11

## 2020-11-27 RX ADMIN — Medication 10 ML: at 05:11

## 2020-11-27 NOTE — PROGRESS NOTES
Nursing Notes  Bedside report received from LORAINE Kc. Patient appears asleep. No signs of distress noted. Will continue to monitor.       Huddle Comments

## 2020-11-27 NOTE — ASSESSMENT & PLAN NOTE
vanc 1450mg IV every 24hr x 8 weeks total till 12/3 per ID ; will stay here for the duration   PT  vanc now 1000mg IV every 24hr till 12/3 per ID last vanc trough 10/31 15.8    Reach out to ns today for guidance on re-imaging  Noted per ID Plan for 6 - 8 weeks of IV antibiotics with repeat MRI at midpoint of treatment as no surgical drainage undertaken.  - ID will follow.  She is currently midpoint; will plan for MRI of thoracic on Monday .  Cont vanc now 750mg IV daily with trough 11/7 16.3 .  11/13 remains max assist; can stand for 1m 20sec; awaiting ID assessment of MRI    11/11 contacted neurosurgery for repeat MRI review. She is only able to stand at bedside with therapy for 1min and 15 sec with mod to max assist. She would be a difficult transfer at this point.   11/16 will repeat message to neurosurgery as well as ID to eval patient maybe virtual visit. She would be a very difficult transfer for in person visit  11/18 no response from ID ; pt set up for AYANNA for OP appnt with ID today   11/20 Still waiting on ID recs, multiple messages sent to neurosurgery; Georgina Morrison and Luis E Calabrese as well as ID; Neftali Rm. We are trying to follow discharge plan from acute stay with repeat MRI and ID follow up and awaiting recommendations. Cont current treatment pending recs    11/23 Dr juan discussed case with Dr Rm and recs to cont plan with vanc till 12/3.

## 2020-11-27 NOTE — PLAN OF CARE
Patient Agrees and Compliant with Plan of Care: Swing Patient.  Monitor Breathing Pattern; Patient on oxygen at 2L nc with oxygen saturation of 95%. No c/o shortness of breath noted this shift. Encouraged IS.  Maintain Pain Regimen: Hydrocodone X 1 given early this shift for c/o lower back pain. Patient admits to relief of pain with medication.  Administer IV Antibiotic as ordered; Vancomycin 750mg IVPB given prior to my arrival this shift. Vancomycin level done at 1900 tonight was 9.6 .   Monitor Vancomycin level as ordered; Next Vancomycin level to be done on 11-29-20.  Maintain I/O's Patient has Purwick draining clear yellow urine. Purwick to be changed early am.  Fall Precautions: Maintain Patient Safety; Patient assisting with care . No falls or injury noted this shift.

## 2020-11-27 NOTE — PROGRESS NOTES
[facility] Medicine  Progress Note    Patient Name: Martina Betancourt  MRN: [id number]  Patient Class: IP- Swing   Admission Date: 10/20/2020  Length of Stay: 38 days  Attending Physician: Vamsi Manuel MD  Primary Care Provider: Joe Fuller Iii, MD        Subjective:     Principal Problem:Debility        HPI:  71yo female patient with hx of alzheiners, CAD, HLD, HTN, myopathy, MI, obesity, sleep apnea and OA (knees non ambulatory uses gonzalez round). She was living at home with her daughter. Recently admitted to [facility] with MRSA bacteremia and subsequent pneumonia treated with Zyvox x 7 days with clearance of her bacteremia now admitted with back pain found to have T9-10 osteo discitis, T8-10 epidural phlegmon with associated paraverterbral abscesses. Spine infection likely hematogenous from under treated bacteremia. Neurosurgery has been consulted. She has been on Vancomycin and Ceftriaxone. Underwent T9-10 disc space biopsy with IR on 10/16. Cultures negative, though had been on IV antibiotics multiple days prior. Afebrile. HDS. Repeat blood cx sterile. ID rec cont vanc 1750mg q 24 till 12/3.     Overview/Hospital Course:  10/23 Here for skilled ; needing IV abx for spinal abscess;  vanc 1750mg q 24 till 12/3  Afebrile , 3LNC - O2 sat 95%   + debility, very deconditioned; bilt LE x 10 reps followed by bed mobility trng and static sit balance and tolerance at side of the bed  C/o back pain with activity; Occupational therapist notes that she is very resistant to movement and c/o severe pain with minimal activity.   Will order toradol 15mg x 1dose IV; pt did much better after toradol rx today per OT. Will order daily prior to OT as tolerated  Monitor renal fx     10/26 here for skilled therapy and cont IV abc. Vanc 1750mg iv every 12hr. Noted elevated WBC this am and she report that she has had diarrhea for the last 4 days.   · PT reports Supine to Sit: maximal assistance, of 2  persons and patient able to reach with UE to push through rail and with improved push off with UE and initiation  · Sit to Supine: maximal assistance of 2 people with improved ability of patient to control descent of upper body  · Transfers:     · Sit to Stand:  moderate assistance, maximal assistance of 2 persons with no AD.  PT and PT tech blocking both knees while assisting pt to elevate hips from bed  Balance: pt able to stand x15 seconds EOB limited largely by knee and back pain.       10/28  She is still on vanc IV daily. No longer with diarrhea. Did have heme positive stools H/H stable on lovenox for DVT prophylaxis and plavix. Will monitor h/h M/Thurs. No fever. No elevated WBC  pt is really immobile.  · Bed Mobility:     · Rolling Left:  moderate assistance  · Rolling Right: moderate assistance  · Scooting: maximal assistance  · Supine to Sit: maximum/moderate assistace and cues  · Sit to Supine: maximum assistace x 1-2 and cues  · Transfers:     · Sit to Stand:  maximum assistance and cues inside the parallel bars with facilitation tech  · Bed to Chair: to wheelchair  with  maximum assistance and cues  using  Slide Board  · Balance: Standing Static inside the parallel bars with Poor grade. Tolerated for ~10 seconds with 2 attempts with  Maximum assistance and cues.  Cont PT daily  10/30  IV  vanc 1,000mg q 24hr x 8 weeks total till 12/3 per ID for discitis; random vanc 12.5 yesterday    No longer with diarrhea. Did have heme positive stools H/H stable on lovenox for DVT prophylaxis and plavix. H&H stable, lovenox stopped. No fever. No elevated WBC. She has productive cough this am. K+ 3.2 yesterday, getting lasix, will repeat KCL 40meq today   pt is really immobile.Standing with RW Static: Poor for ~15 seconds tolerance with max Assistance and cues  Has PICC line- one port clotted;       11/2 she remains on vanc 1000mg  q 24hr x 8 weeks total till 12/3 per ID for discitis. Vanc trough 15.8 10/31/20.  Vss/afebrile. intermittent back pain with prn pain meds helping. She is not doing much with PT. She uses gonzalez round at home but can transfer independently. Here she is max assist     11/4 Remains on vanc 1000mg  q 24hr x 8 weeks total till 12/3 per ID for discitis. Vanc trough 16.6 on 11/2   Afebrile , having regular BMs; getting Norco 10mg q 6   · Continues to require max assist; Sit to Stand:  Max/moderate assistance and cues with standard walker  Balance: Static Stand with Std Walker: Fair- with 2 mins and 7 seconds  tolerance and max/moderate assistance and cues  11/4 Remains on vanc 1000mg  q 24hr x 8 weeks total till 12/3 per ID for discitis. Vanc trough 16.6 on 11/2   Afebrile, WBC nml, creat stable , K+3.4  Requires maximal assist but finally standing with PT with walker .    11/9/20    Remains on vanc 1000mg  q 24hr x 8 weeks total till 12/3 per ID for discitis. VSS/afebrile. No elevated WBC. Last vanc trough 16.3 11/7  She is progressing with PT now min assist from sit to supine and mod assist sit to stand with RW,    11/11 remains on vanc 750mg IV every 24hr, dose monitored by pharm. Last vanc trough 16.5 11/9. Repeat MRI done Monday. Contact with neurosurgery sent to discuss comparison with original johny since she is mid way on IV abx and had no intervention. She has no comoplaints. VSS/labs reviewed. She is standing at bedside 1min and 15 sec with mod assist    11/13 remains on vanc 750mg IV every 24hr, dose monitored by pharm. Last vanc trough 14.8  11/11. Repeat MRI done Monday; mid tx as per ID recommendations;  messaged ID, shows severe Degenerative disease of spine   Able to stand now for over 1m with PT     11/16 remains on vanc every 24hr 750mg. Last vanc trough was 15.9 yesterday. Afebrile, no elevated WBC. She reports that she is feeling well. Getting stronger. Working with PT. Min assist with bed mobility and mod with standing.     11/18/20 planned for OP visit with ID this am   Remains on  "vanc every 24hr 750mg. Last vanc trough was 16.8  yesterday. Afebrile, no elevated WBC    11/20/20 Remains on vanc 1000mg  q 24hr x 8 weeks total till 12/3 per ID for discitis. VSS/afebrile. No elevated WBC. Last vanc trough 19.2 yesterday ;  Went to [facility] with ID 11/18; she reports they told her she still has "three pockets of infection"; however, the note is incomplete and does not provide further recommendations;message sent in Epic this am.   11/20 pt was able stand up and transfer  to Bedside commode for the first time today, also had a BM   ESR/CRP pending otherwise labs stable    11/23/20 pt remains on Vanc 750mg IV every 24hr till 12/3. ID saw patient last week and recommends continued IV abx through the coarse. Neuro surgery has been set up for virtual visit this am. crp improved 139>16 sed rate 44. Labs and VSS. She is min to contact assist with getting up. She is not ambulating though. Case management working with patient and daughter about her d/c plan.     11/25/20 Remains on vanc 1000mg  q 24hr x 8 weeks total till 12/3 per ID for discitisID saw patient last week and recommends continued IV abx through the coarse. Vanc trough 16.3 yesteray; Had virtual visit with neurology yesterday   Standing with Std Walker Static: Fair- and tolerated for 1 min and 15 seconds(1st attempt) ; 45 seconds (2nd attempt)  Pt reports on virtual visit that she will need repeat MRI next week. Still waiting these notes and recs     11/27 vanc 1000mg  q 24hr x 8 weeks total till 12/3 per ID for discitisID saw patient last week and recommends continued IV abx through the coarse. Vanc trough 16.2 yesteray; Renal fx stable    Had virtual visit with neurology; plan CT thoracic spine for further evaluation of bone quality; did not document timing of CT?   - Follow-up with Dr. Monteiro within the next 2 weeks for further discussion, may be virtual visit  - Continue Vancomycin through 12/3 per ID recs      Review of Systems "   Constitutional: Positive for activity change and chills. Negative for fever.   Cardiovascular: Negative for chest pain.   Gastrointestinal: Negative for abdominal pain.   Musculoskeletal: Positive for back pain.   Neurological: Positive for weakness.   Psychiatric/Behavioral: Negative for confusion.        Frustrated with prolonged illness      Objective:     Vital Signs (Most Recent):  Temp: 96.1 °F (35.6 °C) (11/27/20 0813)  Pulse: 73 (11/27/20 0813)  Resp: 18 (11/27/20 0813)  BP: (!) 117/54 (11/27/20 0813)  SpO2: 95 % (11/27/20 0813) Vital Signs (24h Range):  Temp:  [96.1 °F (35.6 °C)-97.5 °F (36.4 °C)] 96.1 °F (35.6 °C)  Pulse:  [72-81] 73  Resp:  [16-20] 18  SpO2:  [91 %-99 %] 95 %  BP: (117-128)/(54-59) 117/54     Weight: 128.4 kg (283 lb 1.1 oz)  Body mass index is 40.62 kg/m².    Intake/Output Summary (Last 24 hours) at 11/27/2020 0841  Last data filed at 11/27/2020 0549  Gross per 24 hour   Intake 480 ml   Output 1300 ml   Net -820 ml      Physical Exam  Vitals signs and nursing note reviewed.   Constitutional:       General: She is not in acute distress.     Appearance: She is well-developed. She is obese.   HENT:      Head: Normocephalic and atraumatic.      Nose: Nose normal.      Mouth/Throat:      Mouth: Mucous membranes are moist.      Pharynx: Oropharynx is clear.   Eyes:      Extraocular Movements: Extraocular movements intact.      Conjunctiva/sclera: Conjunctivae normal.      Pupils: Pupils are equal, round, and reactive to light.   Neck:      Musculoskeletal: Neck supple.      Thyroid: No thyromegaly.   Cardiovascular:      Rate and Rhythm: Normal rate and regular rhythm.      Pulses: Normal pulses.      Heart sounds: No murmur. No friction rub. No gallop.    Pulmonary:      Effort: Pulmonary effort is normal. No respiratory distress.      Breath sounds: No wheezing, rhonchi or rales.   Abdominal:      General: Bowel sounds are normal.      Palpations: Abdomen is soft.   Musculoskeletal:       Right lower leg: No edema.      Left lower leg: No edema.   Lymphadenopathy:      Cervical: No cervical adenopathy.   Skin:     General: Skin is warm and dry.   Neurological:      General: No focal deficit present.      Mental Status: She is alert.      Cranial Nerves: No cranial nerve deficit.      Motor: Weakness present.      Comments: Globally weak    Psychiatric:         Mood and Affect: Mood is depressed.         Behavior: Behavior normal.         Significant Labs: procal 0.04  BMP:   Recent Labs   Lab 11/26/20  0427 11/27/20  0537   GLU  --  89   NA  --  138   K  --  3.7   CL  --  100   CO2  --  28   BUN  --  16   CREATININE  --  1.2   CALCIUM  --  9.6   MG 2.0  --    phos 3.7  Mag 1.9  Lab Results   Component Value Date    WBC 5.85 11/26/2020    HGB 10.0 (L) 11/26/2020    HCT 33.7 (L) 11/26/2020    MCV 91 11/26/2020     11/26/2020 11/20 >>16.2     11/20 sed rate 44    11/19 vanc trough 19.5. 11/21 17.4    MRI thoracic spine   1. Altered signal intensity changes at T9/T10 with evidence of erosive focus about the opposing endplates and evidence of enhancing phlegmonous formation identified in the central component of the disc with central low signal intensity compatible with osteomyelitis/discitis.  Vertical dimensions of the area of interest cause extreme erosion along the anterior portion of the vertebral bodies of interest without evidence of any significant paraspinal component.  2. No significant soft tissue component.  3. Chronic degenerative spondylosis changes of the remaining segments of the thoracic spine.  4. Chronic degenerative changes of the entirety of the cervical spine.      Assessment/Plan:      * Debility  Was walking with walker prior to pneumonia/bacteremia admit last month then after d.c was only w/c bound (gonzalez round) will add pt here and try and get her as strong as poss as she lives at home with family.   10/23 Chronic co back ache but also has recent high ankle fx  ; omar Vinson NP Spoke with Ariela VASQUES who will see patient while on SWING. She looked over x rays and hx and reports that patient can weight bear as tolerated. Nurse/OT/MD notified    · 10/26 Supine to Sit: maximal assistance, of 2 persons and patient able to reach with UE to push through rail and with improved push off with UE and initiation  · Sit to Supine: maximal assistance of 2 people with improved ability of patient to control descent of upper body  · Transfers:     · Sit to Stand:  moderate assistance, maximal assistance of 2 persons with no AD.  PT and PT tech blocking both knees while assisting pt to elevate hips from bed  Balance: pt able to stand x15 seconds EOB limited largely by knee and back pain.     10/28.  · Bed Mobility:     · Rolling Left:  moderate assistance  · Rolling Right: moderate assistance  · Scooting: maximal assistance  · Supine to Sit: maximum/moderate assistace and cues  · Sit to Supine: maximum assistace x 1-2 and cues  · Transfers:     · Sit to Stand:  maximum assistance and cues inside the parallel bars with facilitation tech  · Bed to Chair: to wheelchair  with  maximum assistance and cues  using  Slide Board  · Balance: Standing Static inside the parallel bars with Poor grade. Tolerated for ~10 seconds with 2 attempts with  Maximum assistance and cues.  10/30 Standing with RW Static: Poor for ~15 seconds tolerance with max Assistance and cues  11/2  wheelchair maximum assistance x 2 and cues  with  slide board  using  slide/scoot tech  11/4 Sit to Stand:  Max/moderate assistance and cues with standard walker  Balance: Static Stand with Std Walker: Fair- with 2 mins and 7 seconds  tolerance and max/moderate assistance and cues  · 11/6 max/moderate assistance and cues  with standard walker  Balance: Standing Static with std walker: Poor+ for 1 minute and 15 seconds(1st attempt); 45 seconds(2nd attempt)    11/9 she is becoming stronger  · Cont with PT  · Rolling Left:   stand by assistance  · Rolling Right: stand by assistance  · Scooting: stand by assistance  · Supine to Sit: contact guard assistance  · Sit to Supine: minimum assistance  · Transfers:     Sit to Stand:  moderate assistance with rolling walker.3    11/13/20 Transfers:     · Sit to Stand:  Moderate assistance and cues with standard walker  Balance: Standing with Std Walker Static: Poor+ for  1 minute and  20 seconds(1st attempt); 35 seconds(2nd attempt). woth Moderate Assistance and cues    · 11/16 Scooting: minimum assistance  · Bridging: minimum assistance  · Supine to Sit: minimum assistance  · Sit to Supine: moderate assistance  · Transfers:     · Sit to Stand:  minimum assistance and moderate assistance with standard walker  Balance: Modified independent with static sitting at edge of bed, min assist with static standing with SW     11/18 - Sit to Stand:  moderate assiotance and cues with standard walker  · Balance: Standing with std walker Static: Fair for ~50 seconds tolerance(1st attempt) ; ~10 seconds tolerance(2nd attempt)  11/19 pt was able stand up and transfer  to Bedside commode for the first time today      · 11/23 Sit to Stand:  minimum assistance with rolling walker.  Balance: sitting good, standing Poor    Diarrhea  Stools d/c as diarrhea has improved. + heme occult . lovenox stopped cbc stable     11/20: patient still with significant immobility. H/H stable and no gross bleeding. Will resume lovenox      Malnutrition of mild degree  Dietary..  Boost.      Hydronephrosis  Seen on CT 10/7 - u/a was negative .  Urinating without issue  No flank pain  No signs of infection  No hematuria.      CAD (coronary artery disease)  Cont asa, plavix, lasix, isosorbide, lisinopril, toprol and statin      HTN (hypertension)  Increase lisinopril 2.5>10mg daily  10/23 SBP 160s at times; lisinopril just increased will give more time for lisinopril to work before titration  10/26 SBP 140s; cont to monitor  10/28 SBP  180/80- increase lisinopril  10/30 BP much better 119/59- 138/62  .  11/2 /58.  VSS   11/9 /56- well controlled (cont lisinopril, lasix, isosorbide, metoprolol).  11/23 112/53 well controlled .    Discitis  vanc 1450mg IV every 24hr x 8 weeks total till 12/3 per ID ; will stay here for the duration   PT  vanc now 1000mg IV every 24hr till 12/3 per ID last vanc trough 10/31 15.8    Reach out to ns today for guidance on re-imaging  Noted per ID Plan for 6 - 8 weeks of IV antibiotics with repeat MRI at midpoint of treatment as no surgical drainage undertaken.  - ID will follow.  She is currently midpoint; will plan for MRI of thoracic on Monday .  Cont vanc now 750mg IV daily with trough 11/7 16.3 .  11/13 remains max assist; can stand for 1m 20sec; awaiting ID assessment of MRI    11/11 contacted neurosurgery for repeat MRI review. She is only able to stand at bedside with therapy for 1min and 15 sec with mod to max assist. She would be a difficult transfer at this point.   11/16 will repeat message to neurosurgery as well as ID to eval patient maybe virtual visit. She would be a very difficult transfer for in person visit  11/18 no response from ID ; pt set up for [facility] for OP appnt with ID today   11/20 Still waiting on ID recs, multiple messages sent to neurosurgery; Georgina Morrison and Luis E Calabrese as well as ID; Neftali Rm. We are trying to follow discharge plan from acute stay with repeat MRI and ID follow up and awaiting recommendations. Cont current treatment pending recs    11/23 Dr juan discussed case with Dr Rm and recs to cont plan with vanc till 12/3.    Severe obesity (BMI >= 40)  Using boost as she has low appetite and low protein .      Obstructive sleep apnea treated with continuous positive airway pressure (CPAP)  continue home cpap.      Dementia without behavioral disturbance  Cont namenda and aricept       VTE Risk Mitigation (From admission, onward)         Ordered      enoxaparin injection 40 mg  Every 12 hours      11/21/20 0937                Discharge Planning   BRIT: 12/3/2020     Code Status: Prior   Is the patient medically ready for discharge?:     Reason for patient still in hospital (select all that apply): Treatment  Discharge Plan A: Home with family, Home Health   Discharge Delays: None known at this time              Orestes Rehman MD  Department of Hospital Medicine   [facility]

## 2020-11-27 NOTE — PROGRESS NOTES
Pharmacokinetic Assessment Follow Up: IV Vancomycin    Vancomycin serum concentration assessment(s):    The trough level was drawn correctly and can be used to guide therapy at this time. The measurement is within the desired definitive target range of 15 to 20 mcg/mL.    Vancomycin Regimen Plan:    Continue regimen to Vancomycin 750 mg IV every 24 hours with next serum trough concentration measured at 11/29/20 prior to 4 dose on 1900    Drug levels (last 3 results):  Recent Labs   Lab Result Units 11/24/20  1851 11/26/20  1852   Vancomycin-Trough ug/mL 16.3 16.2       Pharmacy will continue to follow and monitor vancomycin.    Please contact pharmacy at extension [phone] for questions regarding this assessment.    Thank you for the consult,   Jailene Pace       Patient brief summary:  Martina Betancourt is a 70 y.o. female initiated on antimicrobial therapy with IV Vancomycin for treatment of bone/joint infection    The patient's current regimen is 750mg q24    Drug Allergies:   Review of patient's allergies indicates:   Allergen Reactions    Hydroxyzine hcl     Tizanidine        Actual Body Weight:   128kg    Renal Function:   Estimated Creatinine Clearance: 61.9 mL/min (based on SCr of 1.2 mg/dL).,     Dialysis Method (if applicable):  N/A    CBC (last 72 hours):  Recent Labs   Lab Result Units 11/26/20  0427   WBC K/uL 5.85   Hemoglobin g/dL 10.0*   Hematocrit % 33.7*   Platelets K/uL 313   Gran % % 47.7   Lymph % % 33.3   Mono % % 13.2   Eosinophil % % 4.8   Basophil % % 0.5   Differential Method  Automated       Metabolic Panel (last 72 hours):  Recent Labs   Lab Result Units 11/24/20  0543 11/25/20  0537 11/26/20  0426 11/26/20  0427   Sodium mmol/L 139 138 137  --    Potassium mmol/L 4.1 3.7 3.8  --    Chloride mmol/L 100 99 98  --    CO2 mmol/L 29 28 28  --    Glucose mg/dL 84 86 93  --    BUN mg/dL 14 14 15  --    Creatinine mg/dL 1.1 1.0 1.2  --    Magnesium mg/dL  --   --   --  2.0   Phosphorus mg/dL  --    --   --  4.1       Vancomycin Administrations:  vancomycin given in the last 96 hours                     vancomycin 750 mg in dextrose 5 % 250 mL IVPB (ready to mix system) (mg) 750 mg New Bag 11/26/20 1932     750 mg New Bag 11/25/20 2040     750 mg New Bag 11/24/20 2022     750 mg New Bag 11/23/20 2031     750 mg New Bag 11/22/20 2013                    Microbiologic Results:  Microbiology Results (last 7 days)       ** No results found for the last 168 hours. **

## 2020-11-27 NOTE — SUBJECTIVE & OBJECTIVE
Review of Systems   Constitutional: Positive for activity change and chills. Negative for fever.   Cardiovascular: Negative for chest pain.   Gastrointestinal: Negative for abdominal pain.   Musculoskeletal: Positive for back pain.   Neurological: Positive for weakness.   Psychiatric/Behavioral: Negative for confusion.        Frustrated with prolonged illness      Objective:     Vital Signs (Most Recent):  Temp: 96.1 °F (35.6 °C) (11/27/20 0813)  Pulse: 73 (11/27/20 0813)  Resp: 18 (11/27/20 0813)  BP: (!) 117/54 (11/27/20 0813)  SpO2: 95 % (11/27/20 0813) Vital Signs (24h Range):  Temp:  [96.1 °F (35.6 °C)-97.5 °F (36.4 °C)] 96.1 °F (35.6 °C)  Pulse:  [72-81] 73  Resp:  [16-20] 18  SpO2:  [91 %-99 %] 95 %  BP: (117-128)/(54-59) 117/54     Weight: 128.4 kg (283 lb 1.1 oz)  Body mass index is 40.62 kg/m².    Intake/Output Summary (Last 24 hours) at 11/27/2020 0841  Last data filed at 11/27/2020 0549  Gross per 24 hour   Intake 480 ml   Output 1300 ml   Net -820 ml      Physical Exam  Vitals signs and nursing note reviewed.   Constitutional:       General: She is not in acute distress.     Appearance: She is well-developed. She is obese.   HENT:      Head: Normocephalic and atraumatic.      Nose: Nose normal.      Mouth/Throat:      Mouth: Mucous membranes are moist.      Pharynx: Oropharynx is clear.   Eyes:      Extraocular Movements: Extraocular movements intact.      Conjunctiva/sclera: Conjunctivae normal.      Pupils: Pupils are equal, round, and reactive to light.   Neck:      Musculoskeletal: Neck supple.      Thyroid: No thyromegaly.   Cardiovascular:      Rate and Rhythm: Normal rate and regular rhythm.      Pulses: Normal pulses.      Heart sounds: No murmur. No friction rub. No gallop.    Pulmonary:      Effort: Pulmonary effort is normal. No respiratory distress.      Breath sounds: No wheezing, rhonchi or rales.   Abdominal:      General: Bowel sounds are normal.      Palpations: Abdomen is soft.    Musculoskeletal:      Right lower leg: No edema.      Left lower leg: No edema.   Lymphadenopathy:      Cervical: No cervical adenopathy.   Skin:     General: Skin is warm and dry.   Neurological:      General: No focal deficit present.      Mental Status: She is alert.      Cranial Nerves: No cranial nerve deficit.      Motor: Weakness present.      Comments: Globally weak    Psychiatric:         Mood and Affect: Mood is depressed.         Behavior: Behavior normal.         Significant Labs: procal 0.04  BMP:   Recent Labs   Lab 11/26/20  0427 11/27/20  0537   GLU  --  89   NA  --  138   K  --  3.7   CL  --  100   CO2  --  28   BUN  --  16   CREATININE  --  1.2   CALCIUM  --  9.6   MG 2.0  --    phos 3.7  Mag 1.9  Lab Results   Component Value Date    WBC 5.85 11/26/2020    HGB 10.0 (L) 11/26/2020    HCT 33.7 (L) 11/26/2020    MCV 91 11/26/2020     11/26/2020 11/20 >>16.2     11/20 sed rate 44    11/19 vanc trough 19.5. 11/21 17.4    MRI thoracic spine   1. Altered signal intensity changes at T9/T10 with evidence of erosive focus about the opposing endplates and evidence of enhancing phlegmonous formation identified in the central component of the disc with central low signal intensity compatible with osteomyelitis/discitis.  Vertical dimensions of the area of interest cause extreme erosion along the anterior portion of the vertebral bodies of interest without evidence of any significant paraspinal component.  2. No significant soft tissue component.  3. Chronic degenerative spondylosis changes of the remaining segments of the thoracic spine.  4. Chronic degenerative changes of the entirety of the cervical spine.

## 2020-11-27 NOTE — PT/OT/SLP PROGRESS
Occupational Therapy      Patient Name:  Martina Betancourt   MRN:  [id number]    Patient not seen today secondary to Patient fatigue, Patient unwilling to participate. Will follow-up as appropriate.    Golden Bartlett OT  11/27/2020

## 2020-11-27 NOTE — PT/OT/SLP PROGRESS
"Physical Therapy Treatment    Patient Name:  Martina Betancourt   MRN:  [id number]    Recommendations:     Discharge Recommendations:  nursing facility, basic   Discharge Equipment Recommendations: hospital bed   Barriers to discharge: Decreased caregiver support    Assessment:     Martina Betancourt is a 70 y.o. female admitted with a medical diagnosis of Debility.  She presents with the following impairments/functional limitations:  weakness, impaired endurance, impaired functional mobilty, gait instability, impaired self care skills, pain, decreased lower extremity function, decreased safety awareness. Patient tolerated well PT session today. Increased Static stand tolerance using Standard Walker. Noted Knees giving out during standing.    Rehab Prognosis: Fair; patient would benefit from acute skilled PT services to address these deficits and reach maximum level of function.    Recent Surgery: * No surgery found *      Plan:     During this hospitalization, patient to be seen daily to address the identified rehab impairments via gait training, therapeutic activities, therapeutic exercises and progress toward the following goals:    · Plan of Care Expires:  12/01/20    Subjective     Chief Complaint: Right Lower Back Pain  Patient/Family Comments/goals: "To return home with my family".  Pain/Comfort:  Pain Rating 1: 8/10  Location - Side 1: Right  Location - Orientation 1: generalized  Location 1: back  Pain Addressed 1: Reposition, Pre-medicate for activity, Cessation of Activity  Pain Rating Post-Intervention 1: 8/10      Objective:     Communicated with patient prior to session.  Patient found supine with peripheral IV, PureWick, oxygen upon PT entry to room.     General Precautions: Standard, fall   Orthopedic Precautions:N/A   Braces: N/A     Functional Mobility:  · Bed Mobility:     · Rolling Left:  modified independence  · Rolling Right: modified independence  · Scooting: modified independence  · Supine to Sit: " supervision  · Sit to Supine: minimum assistance and cues in ascending bilat LE  · Transfers:     · Sit to Stand:  minimal assistance and cues with standard walker  · Balance: Standing with Std Walker Static: Fair- Tolerated for 1 minute and 25 seconds(1st attempt); 30 seconds( 2nd attempt).      AM-PAC 6 CLICK MOBILITY  Turning over in bed (including adjusting bedclothes, sheets and blankets)?: 3  Sitting down on and standing up from a chair with arms (e.g., wheelchair, bedside commode, etc.): 3  Moving from lying on back to sitting on the side of the bed?: 3  Moving to and from a bed to a chair (including a wheelchair)?: 3  Need to walk in hospital room?: 1  Climbing 3-5 steps with a railing?: 1  Basic Mobility Total Score: 14       Therapeutic Activities and Exercises:   Performed body mechanics on bed mobility and sit to stand transfer trng with Std Walker. Implemented Standing tolerance using Standard Walker; Reviwed and performed Home Ex Program x 10 reps on each such as LAQ, Gluteal sets, Quad sets, Hip Flexion ex, Heel slides and  Scooting ex.    Patient left supine with all lines intact, call button in reach, bed alarm on and nursing notified..    GOALS:   Multidisciplinary Problems     Physical Therapy Goals        Problem: Physical Therapy Goal    Goal Priority Disciplines Outcome Goal Variances Interventions   Physical Therapy Goal     PT, PT/OT Ongoing, Progressing     Description: Goals to be met by: 11/10/20    Patient will increase functional independence with mobility by performin. Rolling to sides using bed rail with contact guard assistance and cues  2. Supine to sit with minimal assistance and cues  3. Sit to supine with moderate assistance and cues  4. Tolerate Static Sit at side of the bed for 10 minutes with Standby Assistance  5. Bed to chair transfer with moderate assistance and cues using stand step TECHNIQUE  6. Lower extremity exercise program x15 reps per handout, with assistance  as needed                      Time Tracking:     PT Received On: 11/27/20  PT Start Time: 1320     PT Stop Time: 1350  PT Total Time (min): 30 min     Billable Minutes: Therapeutic Activity 15 and Therapeutic Exercise 15    Treatment Type: Treatment  PT/PTA: PT     PTA Visit Number: 0     Edgar Lamas, PT  11/27/2020

## 2020-11-28 LAB
ANION GAP SERPL CALC-SCNC: 10 MMOL/L (ref 8–16)
BUN SERPL-MCNC: 13 MG/DL (ref 8–23)
CALCIUM SERPL-MCNC: 10 MG/DL (ref 8.7–10.5)
CHLORIDE SERPL-SCNC: 100 MMOL/L (ref 95–110)
CO2 SERPL-SCNC: 29 MMOL/L (ref 23–29)
CREAT SERPL-MCNC: 1 MG/DL (ref 0.5–1.4)
EST. GFR  (AFRICAN AMERICAN): >60 ML/MIN/1.73 M^2
EST. GFR  (NON AFRICAN AMERICAN): 56 ML/MIN/1.73 M^2
GLUCOSE SERPL-MCNC: 87 MG/DL (ref 70–110)
POTASSIUM SERPL-SCNC: 3.8 MMOL/L (ref 3.5–5.1)
SODIUM SERPL-SCNC: 139 MMOL/L (ref 136–145)

## 2020-11-28 PROCEDURE — 25000003 PHARM REV CODE 250: Performed by: NURSE PRACTITIONER

## 2020-11-28 PROCEDURE — 27000221 HC OXYGEN, UP TO 24 HOURS

## 2020-11-28 PROCEDURE — 25000003 PHARM REV CODE 250: Performed by: FAMILY MEDICINE

## 2020-11-28 PROCEDURE — 63600175 PHARM REV CODE 636 W HCPCS: Performed by: FAMILY MEDICINE

## 2020-11-28 PROCEDURE — 94760 N-INVAS EAR/PLS OXIMETRY 1: CPT

## 2020-11-28 PROCEDURE — 36415 COLL VENOUS BLD VENIPUNCTURE: CPT

## 2020-11-28 PROCEDURE — A4216 STERILE WATER/SALINE, 10 ML: HCPCS | Performed by: NURSE PRACTITIONER

## 2020-11-28 PROCEDURE — 99900035 HC TECH TIME PER 15 MIN (STAT)

## 2020-11-28 PROCEDURE — 94761 N-INVAS EAR/PLS OXIMETRY MLT: CPT

## 2020-11-28 PROCEDURE — 94799 UNLISTED PULMONARY SVC/PX: CPT

## 2020-11-28 PROCEDURE — 80048 BASIC METABOLIC PNL TOTAL CA: CPT

## 2020-11-28 PROCEDURE — 63600175 PHARM REV CODE 636 W HCPCS: Performed by: INTERNAL MEDICINE

## 2020-11-28 PROCEDURE — 11000004 HC SNF PRIVATE

## 2020-11-28 PROCEDURE — 97110 THERAPEUTIC EXERCISES: CPT | Mod: CQ

## 2020-11-28 RX ORDER — ENOXAPARIN SODIUM 100 MG/ML
40 INJECTION SUBCUTANEOUS EVERY 24 HOURS
Status: DISCONTINUED | OUTPATIENT
Start: 2020-11-29 | End: 2020-12-01

## 2020-11-28 RX ADMIN — ENOXAPARIN SODIUM 40 MG: 40 INJECTION SUBCUTANEOUS at 08:11

## 2020-11-28 RX ADMIN — PANTOPRAZOLE SODIUM 40 MG: 40 TABLET, DELAYED RELEASE ORAL at 05:11

## 2020-11-28 RX ADMIN — Medication 10 ML: at 01:11

## 2020-11-28 RX ADMIN — MICONAZOLE NITRATE: 20 OINTMENT TOPICAL at 08:11

## 2020-11-28 RX ADMIN — CLOPIDOGREL 75 MG: 75 TABLET, FILM COATED ORAL at 08:11

## 2020-11-28 RX ADMIN — FERROUS SULFATE TAB 325 MG (65 MG ELEMENTAL FE) 325 MG: 325 (65 FE) TAB at 09:11

## 2020-11-28 RX ADMIN — HYDROCODONE BITARTRATE AND ACETAMINOPHEN 1 TABLET: 10; 325 TABLET ORAL at 09:11

## 2020-11-28 RX ADMIN — ISOSORBIDE MONONITRATE 60 MG: 60 TABLET, EXTENDED RELEASE ORAL at 08:11

## 2020-11-28 RX ADMIN — METHOCARBAMOL TABLETS 500 MG: 500 TABLET, COATED ORAL at 09:11

## 2020-11-28 RX ADMIN — METHOCARBAMOL TABLETS 500 MG: 500 TABLET, COATED ORAL at 05:11

## 2020-11-28 RX ADMIN — Medication 10 ML: at 05:11

## 2020-11-28 RX ADMIN — METHOCARBAMOL TABLETS 500 MG: 500 TABLET, COATED ORAL at 08:11

## 2020-11-28 RX ADMIN — ACETAMINOPHEN 500 MG: 500 TABLET, FILM COATED ORAL at 09:11

## 2020-11-28 RX ADMIN — GUAIFENESIN 600 MG: 600 TABLET, EXTENDED RELEASE ORAL at 08:11

## 2020-11-28 RX ADMIN — MEMANTINE 10 MG: 10 TABLET ORAL at 08:11

## 2020-11-28 RX ADMIN — DONEPEZIL HYDROCHLORIDE 10 MG: 5 TABLET, FILM COATED ORAL at 09:11

## 2020-11-28 RX ADMIN — GUAIFENESIN 600 MG: 600 TABLET, EXTENDED RELEASE ORAL at 09:11

## 2020-11-28 RX ADMIN — HYDROCODONE BITARTRATE AND ACETAMINOPHEN 1 TABLET: 10; 325 TABLET ORAL at 02:11

## 2020-11-28 RX ADMIN — FUROSEMIDE 40 MG: 40 TABLET ORAL at 08:11

## 2020-11-28 RX ADMIN — HYDROCODONE BITARTRATE AND ACETAMINOPHEN 1 TABLET: 10; 325 TABLET ORAL at 08:11

## 2020-11-28 RX ADMIN — LISINOPRIL 10 MG: 10 TABLET ORAL at 08:11

## 2020-11-28 RX ADMIN — MEMANTINE 10 MG: 10 TABLET ORAL at 09:11

## 2020-11-28 RX ADMIN — METHOCARBAMOL TABLETS 500 MG: 500 TABLET, COATED ORAL at 01:11

## 2020-11-28 RX ADMIN — METOPROLOL SUCCINATE 50 MG: 50 TABLET, EXTENDED RELEASE ORAL at 08:11

## 2020-11-28 RX ADMIN — MICONAZOLE NITRATE: 20 OINTMENT TOPICAL at 09:11

## 2020-11-28 RX ADMIN — PRAVASTATIN SODIUM 40 MG: 40 TABLET ORAL at 09:11

## 2020-11-28 RX ADMIN — BISACODYL 5 MG: 5 TABLET, COATED ORAL at 11:11

## 2020-11-28 RX ADMIN — DULOXETINE 60 MG: 30 CAPSULE, DELAYED RELEASE ORAL at 08:11

## 2020-11-28 RX ADMIN — VANCOMYCIN HYDROCHLORIDE 750 MG: 750 INJECTION, POWDER, LYOPHILIZED, FOR SOLUTION INTRAVENOUS at 09:11

## 2020-11-28 RX ADMIN — ASPIRIN 81 MG: 81 TABLET, COATED ORAL at 08:11

## 2020-11-28 NOTE — PT/OT/SLP PROGRESS
"Physical Therapy Treatment    Patient Name:  Martina Betancourt   MRN:  [id number]    Recommendations:     Discharge Recommendations:  nursing facility, skilled   Discharge Equipment Recommendations: hospital bed   Barriers to discharge: Decreased caregiver support    Assessment:     Martina Betanocurt is a 70 y.o. female admitted with a medical diagnosis of Debility.  She presents with the following impairments/functional limitations:  weakness, impaired endurance, impaired self care skills, impaired functional mobilty, gait instability, impaired balance, decreased upper extremity function, decreased lower extremity function, decreased safety awareness, pain. Patient has fair tolerance to therapy activities today due to increase back pain. She was able to sat up at EOB for 5 minutes to complete BLE exercises for flexibility, strengthening, and endurance. Will continue to work on PT goals.     Rehab Prognosis: Fair; patient would benefit from acute skilled PT services to address these deficits and reach maximum level of function.    Recent Surgery: * No surgery found *      Plan:     During this hospitalization, patient to be seen daily to address the identified rehab impairments via gait training, therapeutic activities, therapeutic exercises and progress toward the following goals:    · Plan of Care Expires:  12/01/20    Subjective     Chief Complaint: back pain  Patient/Family Comments/goals: "I didn't get my pain pill yet".  Pain/Comfort:  · Pain Rating 1: 9/10  · Location - Orientation 1: lower  · Location 1: back  · Pain Addressed 1: Pre-medicate for activity, Reposition, Distraction      Objective:     Communicated with nursing and patient prior to session.  Patient found supine with telemetry, peripheral IV upon PT entry to room.     General Precautions: Standard, fall   Orthopedic Precautions:N/A   Braces: N/A     Functional Mobility:  · Bed Mobility:     · Rolling Right: stand by assistance  · Scooting towards " EOB: contact guard assistance  · Bridging towards HOB: stand by assistance  · Supine to Sit: contact guard assistance  · Sit to Supine: minimum assistance for BLE  · Balance: Fair in sitting at EOB      AM-PAC 6 CLICK MOBILITY  Turning over in bed (including adjusting bedclothes, sheets and blankets)?: 3  Sitting down on and standing up from a chair with arms (e.g., wheelchair, bedside commode, etc.): 2  Moving from lying on back to sitting on the side of the bed?: 3  Moving to and from a bed to a chair (including a wheelchair)?: 2  Need to walk in hospital room?: 2  Climbing 3-5 steps with a railing?: 1  Basic Mobility Total Score: 13       Therapeutic Activities and Exercises:  Supine <> sit  Sitting at EOB for 5 minutes with BUE supports on bed  Seated bilateral ankle pumps 10x  Seated LAQ 10x each  Seated hip ADD pillow squeezes 10x    Patient left HOB elevated with all lines intact and call button in reach..    GOALS:   Multidisciplinary Problems     Physical Therapy Goals        Problem: Physical Therapy Goal    Goal Priority Disciplines Outcome Goal Variances Interventions   Physical Therapy Goal     PT, PT/OT Ongoing, Progressing     Description: Goals to be met by: 11/10/20    Patient will increase functional independence with mobility by performin. Rolling to sides using bed rail with contact guard assistance and cues  2. Supine to sit with minimal assistance and cues  3. Sit to supine with moderate assistance and cues  4. Tolerate Static Sit at side of the bed for 10 minutes with Standby Assistance  5. Bed to chair transfer with moderate assistance and cues using stand step TECHNIQUE  6. Lower extremity exercise program x15 reps per handout, with assistance as needed                      Time Tracking:     PT Received On: 20  PT Start Time: 746     PT Stop Time: 0800  PT Total Time (min): 14 min     Billable Minutes: Therapeutic Exercise 14    Treatment Type: Treatment  PT/PTA: PTA     PTA  Visit Number: 1     Sherlyn Dalton, ROSY  11/28/2020   no

## 2020-11-28 NOTE — PLAN OF CARE
Patient here as swing patient to work with PT and for antibiotic treatment. Patient is compliant with fluid and medication management. Tolerating IV antibiotics well. Able to take oral medications without difficulty. Patient is understanding and compliant with lab draws and procedures. Patient is free from falls and injury. VSS. Plan of care reviewed and agreed upon with patient. Will continue to monitor.

## 2020-11-28 NOTE — PLAN OF CARE
Problem: Adult Inpatient Plan of Care  Goal: Plan of Care Review  Outcome: Ongoing, Progressing     Problem: Adult Inpatient Plan of Care  Goal: Absence of Hospital-Acquired Illness or Injury  Outcome: Ongoing, Progressing     Problem: Bariatric Environmental Safety  Goal: Safety Maintained with Care  Outcome: Ongoing, Progressing     Problem: Fall Injury Risk  Goal: Absence of Fall and Fall-Related Injury  Outcome: Ongoing, Progressing     Problem: Skin Injury Risk Increased  Goal: Skin Health and Integrity  Outcome: Ongoing, Progressing    Pt admitted as swing status for IV vancomycin r/t epidural abscesses. Next vanc trough due 11/29 at 1900. Pt with L upper arm PICC. Purple port occluded. Pt maintained on 2L NC, sats WNL. Pt with purewick in place. Pt c/o of back pain controlled with tylenol, norco, and robaxin. PT/OT working with pt. Plan of care discussed with pt.

## 2020-11-28 NOTE — PLAN OF CARE
Patient admitted as SWING, Assessment complete per flow sheet, AAOX4, RR even unlabored BBS diminished, on 2 L NC. Abdomen soft, non distended, with active bowel sounds x 4 quads.Tolerating diet well. PICC to RT arm SL, purple port does not flush, red port sluggish, Dsg C/D/I. Medications administered per MAR, tolerated well. HERMELINDA heels elevated off bed with pillows, safety precautions maintained, bed alarm on, free from falls/ injury, call bell in reach, instructed to call for needs, voiced understanding.agrees with plan of care.  Worked with PT/OT, see notes  C/O abdominal pain and constipation, medication given will monitor.

## 2020-11-29 LAB
ANION GAP SERPL CALC-SCNC: 10 MMOL/L (ref 8–16)
BUN SERPL-MCNC: 12 MG/DL (ref 8–23)
CALCIUM SERPL-MCNC: 10 MG/DL (ref 8.7–10.5)
CHLORIDE SERPL-SCNC: 100 MMOL/L (ref 95–110)
CO2 SERPL-SCNC: 29 MMOL/L (ref 23–29)
CREAT SERPL-MCNC: 1.1 MG/DL (ref 0.5–1.4)
EST. GFR  (AFRICAN AMERICAN): 58 ML/MIN/1.73 M^2
EST. GFR  (NON AFRICAN AMERICAN): 50 ML/MIN/1.73 M^2
GLUCOSE SERPL-MCNC: 85 MG/DL (ref 70–110)
POTASSIUM SERPL-SCNC: 4.2 MMOL/L (ref 3.5–5.1)
SODIUM SERPL-SCNC: 139 MMOL/L (ref 136–145)
VANCOMYCIN TROUGH SERPL-MCNC: 10.1 UG/ML (ref 10–22)

## 2020-11-29 PROCEDURE — 97530 THERAPEUTIC ACTIVITIES: CPT | Mod: CQ

## 2020-11-29 PROCEDURE — 25000003 PHARM REV CODE 250: Performed by: NURSE PRACTITIONER

## 2020-11-29 PROCEDURE — 80048 BASIC METABOLIC PNL TOTAL CA: CPT

## 2020-11-29 PROCEDURE — 94761 N-INVAS EAR/PLS OXIMETRY MLT: CPT

## 2020-11-29 PROCEDURE — 36415 COLL VENOUS BLD VENIPUNCTURE: CPT

## 2020-11-29 PROCEDURE — 27000221 HC OXYGEN, UP TO 24 HOURS

## 2020-11-29 PROCEDURE — A4216 STERILE WATER/SALINE, 10 ML: HCPCS | Performed by: NURSE PRACTITIONER

## 2020-11-29 PROCEDURE — 63600175 PHARM REV CODE 636 W HCPCS: Performed by: FAMILY MEDICINE

## 2020-11-29 PROCEDURE — 94799 UNLISTED PULMONARY SVC/PX: CPT

## 2020-11-29 PROCEDURE — 11000004 HC SNF PRIVATE

## 2020-11-29 PROCEDURE — 80202 ASSAY OF VANCOMYCIN: CPT

## 2020-11-29 RX ORDER — VANCOMYCIN HCL IN 5 % DEXTROSE 1G/250ML
1000 PLASTIC BAG, INJECTION (ML) INTRAVENOUS
Status: DISCONTINUED | OUTPATIENT
Start: 2020-11-30 | End: 2020-12-01 | Stop reason: HOSPADM

## 2020-11-29 RX ADMIN — Medication 10 ML: at 01:11

## 2020-11-29 RX ADMIN — Medication 10 ML: at 05:11

## 2020-11-29 RX ADMIN — MELATONIN TAB 3 MG 6 MG: 3 TAB at 09:11

## 2020-11-29 RX ADMIN — Medication 10 ML: at 12:11

## 2020-11-29 RX ADMIN — ISOSORBIDE MONONITRATE 60 MG: 60 TABLET, EXTENDED RELEASE ORAL at 08:11

## 2020-11-29 RX ADMIN — HYDROCODONE BITARTRATE AND ACETAMINOPHEN 1 TABLET: 10; 325 TABLET ORAL at 09:11

## 2020-11-29 RX ADMIN — DONEPEZIL HYDROCHLORIDE 10 MG: 5 TABLET, FILM COATED ORAL at 09:11

## 2020-11-29 RX ADMIN — METOPROLOL SUCCINATE 50 MG: 50 TABLET, EXTENDED RELEASE ORAL at 08:11

## 2020-11-29 RX ADMIN — VANCOMYCIN HYDROCHLORIDE 750 MG: 750 INJECTION, POWDER, LYOPHILIZED, FOR SOLUTION INTRAVENOUS at 09:11

## 2020-11-29 RX ADMIN — METHOCARBAMOL TABLETS 500 MG: 500 TABLET, COATED ORAL at 06:11

## 2020-11-29 RX ADMIN — LISINOPRIL 10 MG: 10 TABLET ORAL at 08:11

## 2020-11-29 RX ADMIN — MICONAZOLE NITRATE: 20 OINTMENT TOPICAL at 09:11

## 2020-11-29 RX ADMIN — ACETAMINOPHEN 500 MG: 500 TABLET, FILM COATED ORAL at 09:11

## 2020-11-29 RX ADMIN — METHOCARBAMOL TABLETS 500 MG: 500 TABLET, COATED ORAL at 08:11

## 2020-11-29 RX ADMIN — PRAVASTATIN SODIUM 40 MG: 40 TABLET ORAL at 09:11

## 2020-11-29 RX ADMIN — HYDROCODONE BITARTRATE AND ACETAMINOPHEN 1 TABLET: 10; 325 TABLET ORAL at 02:11

## 2020-11-29 RX ADMIN — BISACODYL 5 MG: 5 TABLET, COATED ORAL at 09:11

## 2020-11-29 RX ADMIN — METHOCARBAMOL TABLETS 500 MG: 500 TABLET, COATED ORAL at 09:11

## 2020-11-29 RX ADMIN — DULOXETINE 60 MG: 30 CAPSULE, DELAYED RELEASE ORAL at 08:11

## 2020-11-29 RX ADMIN — CLOPIDOGREL 75 MG: 75 TABLET, FILM COATED ORAL at 08:11

## 2020-11-29 RX ADMIN — GUAIFENESIN 600 MG: 600 TABLET, EXTENDED RELEASE ORAL at 08:11

## 2020-11-29 RX ADMIN — METHOCARBAMOL TABLETS 500 MG: 500 TABLET, COATED ORAL at 01:11

## 2020-11-29 RX ADMIN — FUROSEMIDE 40 MG: 40 TABLET ORAL at 08:11

## 2020-11-29 RX ADMIN — MEMANTINE 10 MG: 10 TABLET ORAL at 08:11

## 2020-11-29 RX ADMIN — ENOXAPARIN SODIUM 40 MG: 40 INJECTION SUBCUTANEOUS at 08:11

## 2020-11-29 RX ADMIN — MICONAZOLE NITRATE: 20 OINTMENT TOPICAL at 08:11

## 2020-11-29 RX ADMIN — PANTOPRAZOLE SODIUM 40 MG: 40 TABLET, DELAYED RELEASE ORAL at 05:11

## 2020-11-29 RX ADMIN — GUAIFENESIN 600 MG: 600 TABLET, EXTENDED RELEASE ORAL at 09:11

## 2020-11-29 RX ADMIN — ASPIRIN 81 MG: 81 TABLET, COATED ORAL at 08:11

## 2020-11-29 RX ADMIN — HYDROCODONE BITARTRATE AND ACETAMINOPHEN 1 TABLET: 10; 325 TABLET ORAL at 07:11

## 2020-11-29 RX ADMIN — MEMANTINE 10 MG: 10 TABLET ORAL at 09:11

## 2020-11-29 RX ADMIN — FERROUS SULFATE TAB 325 MG (65 MG ELEMENTAL FE) 325 MG: 325 (65 FE) TAB at 09:11

## 2020-11-29 NOTE — PT/OT/SLP PROGRESS
"Physical Therapy Treatment    Patient Name:  Martina Betancourt   MRN:  [id number]    Recommendations:     Discharge Recommendations:  nursing facility, skilled   Discharge Equipment Recommendations: none   Barriers to discharge: Decreased caregiver support    Assessment:     Martina Betancourt is a 70 y.o. female admitted with a medical diagnosis of Debility.  She presents with the following impairments/functional limitations:  weakness, impaired endurance, impaired self care skills, impaired functional mobilty, gait instability, impaired balance, decreased upper extremity function, decreased lower extremity function, pain, decreased ROM . Patient appears to be tolerating therapy better this AM compared to last visit. Her pain level is still at 8 or 9 out 10 to lower back. However, she was able to complete BLE while sitting up at EOB for strengthening and endurance with no complaints of increase low back pain. Continue working on PT goals.     Rehab Prognosis: Fair; patient would benefit from acute skilled PT services to address these deficits and reach maximum level of function.    Recent Surgery: * No surgery found *      Plan:     During this hospitalization, patient to be seen daily to address the identified rehab impairments via gait training, therapeutic activities, therapeutic exercises and progress toward the following goals:    · Plan of Care Expires:  12/01/20    Subjective     Chief Complaint: back pain  Patient/Family Comments/goals: "my back always hurts".  Pain/Comfort:  · Pain Rating 1: 8/10  · Location - Orientation 1: lower  · Location 1: back  · Pain Addressed 1: Pre-medicate for activity, Reposition, Distraction      Objective:     Communicated with nursing and patient prior to session.  Patient found HOB elevated with telemetry, peripheral IV, PureWick upon PT entry to room.     General Precautions: Standard, fall   Orthopedic Precautions:N/A   Braces: N/A     Functional Mobility:  · Bed Mobility:   "   · Rolling Right: stand by assistance  · Scooting: stand by assistance  · Bridging: stand by assistance  · Supine to Sit: stand by assistance  · Sit to Supine: minimum assistance        AM-PAC 6 CLICK MOBILITY  Turning over in bed (including adjusting bedclothes, sheets and blankets)?: 4  Sitting down on and standing up from a chair with arms (e.g., wheelchair, bedside commode, etc.): 2  Moving from lying on back to sitting on the side of the bed?: 3  Moving to and from a bed to a chair (including a wheelchair)?: 2  Need to walk in hospital room?: 2  Climbing 3-5 steps with a railing?: 1  Basic Mobility Total Score: 14       Therapeutic Activities and Exercises:   supine <> sit  Sitting at EOB for 5 minutes with BUE supports on bed  Seated bilateral ankle pumps 10x  Seated LAQ 10x each  Seated hip ADD pillow squeezes 10x  Seated marches 10x    Patient left HOB elevated with all lines intact and call button in reach..    GOALS:   Multidisciplinary Problems     Physical Therapy Goals        Problem: Physical Therapy Goal    Goal Priority Disciplines Outcome Goal Variances Interventions   Physical Therapy Goal     PT, PT/OT Ongoing, Progressing     Description: Goals to be met by: 11/10/20    Patient will increase functional independence with mobility by performin. Rolling to sides using bed rail with contact guard assistance and cues  2. Supine to sit with minimal assistance and cues  3. Sit to supine with moderate assistance and cues  4. Tolerate Static Sit at side of the bed for 10 minutes with Standby Assistance  5. Bed to chair transfer with moderate assistance and cues using stand step TECHNIQUE  6. Lower extremity exercise program x15 reps per handout, with assistance as needed                      Time Tracking:     PT Received On: 20  PT Start Time: 805     PT Stop Time: 820  PT Total Time (min): 15 min     Billable Minutes: Therapeutic Activity 15    Treatment Type: Treatment  PT/PTA: PTA      PTA Visit Number: 2     Sherlyn Dalton, ROSY  11/29/2020

## 2020-11-29 NOTE — PLAN OF CARE
Problem: Adult Inpatient Plan of Care  Goal: Plan of Care Review  Outcome: Ongoing, Progressing  Goal: Patient-Specific Goal (Individualization)  Outcome: Ongoing, Progressing  Goal: Absence of Hospital-Acquired Illness or Injury  Outcome: Ongoing, Progressing  Goal: Optimal Comfort and Wellbeing  Outcome: Ongoing, Progressing  Goal: Readiness for Transition of Care  Outcome: Ongoing, Progressing  Goal: Rounds/Family Conference  Outcome: Ongoing, Progressing     Problem: Bariatric Environmental Safety  Goal: Safety Maintained with Care  Outcome: Ongoing, Progressing     Problem: Fluid Imbalance (Pneumonia)  Goal: Fluid Balance  Outcome: Ongoing, Progressing     Problem: Infection (Pneumonia)  Goal: Resolution of Infection Signs/Symptoms  Outcome: Ongoing, Progressing     Problem: Respiratory Compromise (Pneumonia)  Goal: Effective Oxygenation and Ventilation  Outcome: Ongoing, Progressing     Problem: Infection  Goal: Infection Symptom Resolution  Outcome: Ongoing, Progressing     Problem: Wound  Goal: Optimal Wound Healing  Outcome: Ongoing, Progressing     Problem: Fall Injury Risk  Goal: Absence of Fall and Fall-Related Injury  Outcome: Ongoing, Progressing     Problem: Skin Injury Risk Increased  Goal: Skin Health and Integrity  Outcome: Ongoing, Progressing

## 2020-11-30 ENCOUNTER — TELEPHONE (OUTPATIENT)
Dept: NEUROSURGERY | Facility: CLINIC | Age: 70
End: 2020-11-30

## 2020-11-30 LAB
ANION GAP SERPL CALC-SCNC: 11 MMOL/L (ref 8–16)
BASOPHILS # BLD AUTO: 0.04 K/UL (ref 0–0.2)
BASOPHILS NFR BLD: 0.6 % (ref 0–1.9)
BUN SERPL-MCNC: 15 MG/DL (ref 8–23)
CALCIUM SERPL-MCNC: 10 MG/DL (ref 8.7–10.5)
CHLORIDE SERPL-SCNC: 98 MMOL/L (ref 95–110)
CO2 SERPL-SCNC: 28 MMOL/L (ref 23–29)
CREAT SERPL-MCNC: 1.1 MG/DL (ref 0.5–1.4)
DIFFERENTIAL METHOD: ABNORMAL
EOSINOPHIL # BLD AUTO: 0.3 K/UL (ref 0–0.5)
EOSINOPHIL NFR BLD: 4.8 % (ref 0–8)
ERYTHROCYTE [DISTWIDTH] IN BLOOD BY AUTOMATED COUNT: 16.7 % (ref 11.5–14.5)
EST. GFR  (AFRICAN AMERICAN): 58 ML/MIN/1.73 M^2
EST. GFR  (NON AFRICAN AMERICAN): 50 ML/MIN/1.73 M^2
GLUCOSE SERPL-MCNC: 95 MG/DL (ref 70–110)
HCT VFR BLD AUTO: 35.4 % (ref 37–48.5)
HGB BLD-MCNC: 10.7 G/DL (ref 12–16)
IMM GRANULOCYTES # BLD AUTO: 0.01 K/UL (ref 0–0.04)
IMM GRANULOCYTES NFR BLD AUTO: 0.1 % (ref 0–0.5)
LYMPHOCYTES # BLD AUTO: 1.8 K/UL (ref 1–4.8)
LYMPHOCYTES NFR BLD: 25.9 % (ref 18–48)
MAGNESIUM SERPL-MCNC: 2.2 MG/DL (ref 1.6–2.6)
MCH RBC QN AUTO: 27.6 PG (ref 27–31)
MCHC RBC AUTO-ENTMCNC: 30.2 G/DL (ref 32–36)
MCV RBC AUTO: 91 FL (ref 82–98)
MONOCYTES # BLD AUTO: 0.9 K/UL (ref 0.3–1)
MONOCYTES NFR BLD: 12.6 % (ref 4–15)
NEUTROPHILS # BLD AUTO: 3.9 K/UL (ref 1.8–7.7)
NEUTROPHILS NFR BLD: 56 % (ref 38–73)
NRBC BLD-RTO: 0 /100 WBC
PHOSPHATE SERPL-MCNC: 4.4 MG/DL (ref 2.7–4.5)
PLATELET # BLD AUTO: 327 K/UL (ref 150–350)
PMV BLD AUTO: 9.4 FL (ref 9.2–12.9)
POTASSIUM SERPL-SCNC: 3.8 MMOL/L (ref 3.5–5.1)
RBC # BLD AUTO: 3.88 M/UL (ref 4–5.4)
SODIUM SERPL-SCNC: 137 MMOL/L (ref 136–145)
WBC # BLD AUTO: 6.92 K/UL (ref 3.9–12.7)

## 2020-11-30 PROCEDURE — 63600175 PHARM REV CODE 636 W HCPCS: Performed by: FAMILY MEDICINE

## 2020-11-30 PROCEDURE — 85025 COMPLETE CBC W/AUTO DIFF WBC: CPT

## 2020-11-30 PROCEDURE — 99309 SBSQ NF CARE MODERATE MDM 30: CPT | Mod: ,,, | Performed by: FAMILY MEDICINE

## 2020-11-30 PROCEDURE — 83735 ASSAY OF MAGNESIUM: CPT

## 2020-11-30 PROCEDURE — 25000003 PHARM REV CODE 250: Performed by: NURSE PRACTITIONER

## 2020-11-30 PROCEDURE — 84100 ASSAY OF PHOSPHORUS: CPT

## 2020-11-30 PROCEDURE — 36415 COLL VENOUS BLD VENIPUNCTURE: CPT

## 2020-11-30 PROCEDURE — 27000221 HC OXYGEN, UP TO 24 HOURS

## 2020-11-30 PROCEDURE — A4216 STERILE WATER/SALINE, 10 ML: HCPCS | Performed by: NURSE PRACTITIONER

## 2020-11-30 PROCEDURE — 97535 SELF CARE MNGMENT TRAINING: CPT

## 2020-11-30 PROCEDURE — 99309 PR NURSING FAC CARE, SUBSEQ, SIGNIF COMPLIC: ICD-10-PCS | Mod: ,,, | Performed by: FAMILY MEDICINE

## 2020-11-30 PROCEDURE — 11000004 HC SNF PRIVATE

## 2020-11-30 PROCEDURE — 94761 N-INVAS EAR/PLS OXIMETRY MLT: CPT

## 2020-11-30 PROCEDURE — 25000003 PHARM REV CODE 250: Performed by: FAMILY MEDICINE

## 2020-11-30 PROCEDURE — 80048 BASIC METABOLIC PNL TOTAL CA: CPT

## 2020-11-30 PROCEDURE — 94799 UNLISTED PULMONARY SVC/PX: CPT

## 2020-11-30 RX ADMIN — PRAVASTATIN SODIUM 40 MG: 40 TABLET ORAL at 09:11

## 2020-11-30 RX ADMIN — PANTOPRAZOLE SODIUM 40 MG: 40 TABLET, DELAYED RELEASE ORAL at 05:11

## 2020-11-30 RX ADMIN — METHOCARBAMOL TABLETS 500 MG: 500 TABLET, COATED ORAL at 08:11

## 2020-11-30 RX ADMIN — FUROSEMIDE 40 MG: 40 TABLET ORAL at 08:11

## 2020-11-30 RX ADMIN — ENOXAPARIN SODIUM 40 MG: 40 INJECTION SUBCUTANEOUS at 08:11

## 2020-11-30 RX ADMIN — CALCIUM CARBONATE (ANTACID) CHEW TAB 500 MG 500 MG: 500 CHEW TAB at 09:11

## 2020-11-30 RX ADMIN — GUAIFENESIN 600 MG: 600 TABLET, EXTENDED RELEASE ORAL at 08:11

## 2020-11-30 RX ADMIN — BISACODYL 5 MG: 5 TABLET, COATED ORAL at 11:11

## 2020-11-30 RX ADMIN — DONEPEZIL HYDROCHLORIDE 10 MG: 5 TABLET, FILM COATED ORAL at 09:11

## 2020-11-30 RX ADMIN — HYDROCODONE BITARTRATE AND ACETAMINOPHEN 1 TABLET: 10; 325 TABLET ORAL at 09:11

## 2020-11-30 RX ADMIN — GUAIFENESIN 600 MG: 600 TABLET, EXTENDED RELEASE ORAL at 09:11

## 2020-11-30 RX ADMIN — ACETAMINOPHEN 500 MG: 500 TABLET, FILM COATED ORAL at 09:11

## 2020-11-30 RX ADMIN — LISINOPRIL 10 MG: 10 TABLET ORAL at 08:11

## 2020-11-30 RX ADMIN — ISOSORBIDE MONONITRATE 60 MG: 60 TABLET, EXTENDED RELEASE ORAL at 08:11

## 2020-11-30 RX ADMIN — ACETAMINOPHEN 500 MG: 500 TABLET, FILM COATED ORAL at 08:11

## 2020-11-30 RX ADMIN — METHOCARBAMOL TABLETS 500 MG: 500 TABLET, COATED ORAL at 01:11

## 2020-11-30 RX ADMIN — Medication 10 ML: at 06:11

## 2020-11-30 RX ADMIN — METHOCARBAMOL TABLETS 500 MG: 500 TABLET, COATED ORAL at 05:11

## 2020-11-30 RX ADMIN — HYDROCODONE BITARTRATE AND ACETAMINOPHEN 1 TABLET: 10; 325 TABLET ORAL at 11:11

## 2020-11-30 RX ADMIN — Medication 10 ML: at 12:11

## 2020-11-30 RX ADMIN — DULOXETINE 60 MG: 30 CAPSULE, DELAYED RELEASE ORAL at 08:11

## 2020-11-30 RX ADMIN — CLOPIDOGREL 75 MG: 75 TABLET, FILM COATED ORAL at 08:11

## 2020-11-30 RX ADMIN — FERROUS SULFATE TAB 325 MG (65 MG ELEMENTAL FE) 325 MG: 325 (65 FE) TAB at 09:11

## 2020-11-30 RX ADMIN — METOPROLOL SUCCINATE 50 MG: 50 TABLET, EXTENDED RELEASE ORAL at 08:11

## 2020-11-30 RX ADMIN — MICONAZOLE NITRATE: 20 OINTMENT TOPICAL at 08:11

## 2020-11-30 RX ADMIN — MEMANTINE 10 MG: 10 TABLET ORAL at 09:11

## 2020-11-30 RX ADMIN — ASPIRIN 81 MG: 81 TABLET, COATED ORAL at 08:11

## 2020-11-30 RX ADMIN — VANCOMYCIN HYDROCHLORIDE 1000 MG: 1 INJECTION, POWDER, LYOPHILIZED, FOR SOLUTION INTRAVENOUS at 09:11

## 2020-11-30 RX ADMIN — MEMANTINE 10 MG: 10 TABLET ORAL at 08:11

## 2020-11-30 RX ADMIN — MICONAZOLE NITRATE: 20 OINTMENT TOPICAL at 09:11

## 2020-11-30 RX ADMIN — METHOCARBAMOL TABLETS 500 MG: 500 TABLET, COATED ORAL at 09:11

## 2020-11-30 RX ADMIN — Medication 10 ML: at 11:11

## 2020-11-30 RX ADMIN — Medication 10 ML: at 05:11

## 2020-11-30 NOTE — PT/OT/SLP PROGRESS
Physical Therapy      Patient Name:  Martina Betancourt   MRN:  [id number]    Patient not seen today secondary to CT/MRI(Patient is out for CT SCAN at this time.). Will follow-up later if schedule allows.    Edgar Lamas, PT

## 2020-11-30 NOTE — SUBJECTIVE & OBJECTIVE
Review of Systems   Constitutional: Positive for activity change. Negative for chills and fever.   Cardiovascular: Negative for chest pain.   Gastrointestinal: Negative for abdominal pain.   Musculoskeletal: Positive for back pain.   Neurological: Positive for weakness.   Psychiatric/Behavioral: Negative for confusion.        Frustrated with prolonged illness      Objective:     Vital Signs (Most Recent):  Temp: 97.9 °F (36.6 °C) (11/30/20 0805)  Pulse: 68 (11/30/20 0805)  Resp: 20 (11/30/20 0805)  BP: (!) 115/57 (11/30/20 0805)  SpO2: (!) 94 % (11/30/20 0811) Vital Signs (24h Range):  Temp:  [97.7 °F (36.5 °C)-97.9 °F (36.6 °C)] 97.9 °F (36.6 °C)  Pulse:  [65-68] 68  Resp:  [18-20] 20  SpO2:  [94 %-96 %] 94 %  BP: (112-115)/(56-57) 115/57     Weight: 127.1 kg (280 lb 3.3 oz)  Body mass index is 40.21 kg/m².    Intake/Output Summary (Last 24 hours) at 11/30/2020 0851  Last data filed at 11/30/2020 0554  Gross per 24 hour   Intake 769 ml   Output 2200 ml   Net -1431 ml      Physical Exam  Vitals signs and nursing note reviewed.   Constitutional:       General: She is not in acute distress.     Appearance: She is well-developed. She is obese.   HENT:      Head: Normocephalic and atraumatic.      Nose: Nose normal.      Mouth/Throat:      Mouth: Mucous membranes are moist.      Pharynx: Oropharynx is clear.   Eyes:      Extraocular Movements: Extraocular movements intact.      Conjunctiva/sclera: Conjunctivae normal.      Pupils: Pupils are equal, round, and reactive to light.   Neck:      Musculoskeletal: Neck supple.      Thyroid: No thyromegaly.   Cardiovascular:      Rate and Rhythm: Normal rate and regular rhythm.      Pulses: Normal pulses.      Heart sounds: No murmur. No friction rub. No gallop.    Pulmonary:      Effort: Pulmonary effort is normal. No respiratory distress.      Breath sounds: No wheezing, rhonchi or rales.   Abdominal:      General: Bowel sounds are normal.      Palpations: Abdomen is soft.    Musculoskeletal:      Right lower leg: No edema.      Left lower leg: No edema.   Lymphadenopathy:      Cervical: No cervical adenopathy.   Skin:     General: Skin is warm and dry.   Neurological:      General: No focal deficit present.      Mental Status: She is alert.      Cranial Nerves: No cranial nerve deficit.      Motor: Weakness present.      Comments: Globally weak    Psychiatric:         Mood and Affect: Mood is depressed.         Behavior: Behavior normal.         Significant Labs: procal 0.04  BMP:   Recent Labs   Lab 11/30/20  0527 11/30/20  0528   GLU 95  --      --    K 3.8  --    CL 98  --    CO2 28  --    BUN 15  --    CREATININE 1.1  --    CALCIUM 10.0  --    MG  --  2.2   phos 3.7  Mag 1.9  Lab Results   Component Value Date    WBC 6.92 11/30/2020    HGB 10.7 (L) 11/30/2020    HCT 35.4 (L) 11/30/2020    MCV 91 11/30/2020     11/30/2020 11/20 >>16.2     11/20 sed rate 44   11/29 vanc trough 10.1     MRI thoracic spine   1. Altered signal intensity changes at T9/T10 with evidence of erosive focus about the opposing endplates and evidence of enhancing phlegmonous formation identified in the central component of the disc with central low signal intensity compatible with osteomyelitis/discitis.  Vertical dimensions of the area of interest cause extreme erosion along the anterior portion of the vertebral bodies of interest without evidence of any significant paraspinal component.  2. No significant soft tissue component.  3. Chronic degenerative spondylosis changes of the remaining segments of the thoracic spine.  4. Chronic degenerative changes of the entirety of the cervical spine.

## 2020-11-30 NOTE — ASSESSMENT & PLAN NOTE
Was walking with walker prior to pneumonia/bacteremia admit last month then after d.c was only w/c bound (gonzalez round) will add pt here and try and get her as strong as poss as she lives at home with family.   10/23 Chronic co back ache but also has recent high ankle fx ; per EDDIE Vinson NP Spoke with Ariela VASQUES who will see patient while on SWING. She looked over x rays and hx and reports that patient can weight bear as tolerated. Nurse/OT/MD notified    · 10/26 Supine to Sit: maximal assistance, of 2 persons and patient able to reach with UE to push through rail and with improved push off with UE and initiation  · Sit to Supine: maximal assistance of 2 people with improved ability of patient to control descent of upper body  · Transfers:     · Sit to Stand:  moderate assistance, maximal assistance of 2 persons with no AD.  PT and PT tech blocking both knees while assisting pt to elevate hips from bed  Balance: pt able to stand x15 seconds EOB limited largely by knee and back pain.     10/28.  · Bed Mobility:     · Rolling Left:  moderate assistance  · Rolling Right: moderate assistance  · Scooting: maximal assistance  · Supine to Sit: maximum/moderate assistace and cues  · Sit to Supine: maximum assistace x 1-2 and cues  · Transfers:     · Sit to Stand:  maximum assistance and cues inside the parallel bars with facilitation tech  · Bed to Chair: to wheelchair  with  maximum assistance and cues  using  Slide Board  · Balance: Standing Static inside the parallel bars with Poor grade. Tolerated for ~10 seconds with 2 attempts with  Maximum assistance and cues.  10/30 Standing with RW Static: Poor for ~15 seconds tolerance with max Assistance and cues  11/2  wheelchair maximum assistance x 2 and cues  with  slide board  using  slide/scoot tech  11/4 Sit to Stand:  Max/moderate assistance and cues with standard walker  Balance: Static Stand with Std Walker: Fair- with 2 mins and 7 seconds  tolerance and  max/moderate assistance and cues  · 11/6 max/moderate assistance and cues  with standard walker  Balance: Standing Static with std walker: Poor+ for 1 minute and 15 seconds(1st attempt); 45 seconds(2nd attempt)    11/9 she is becoming stronger  · Cont with PT  · Rolling Left:  stand by assistance  · Rolling Right: stand by assistance  · Scooting: stand by assistance  · Supine to Sit: contact guard assistance  · Sit to Supine: minimum assistance  · Transfers:     Sit to Stand:  moderate assistance with rolling walker.3    11/13/20 Transfers:     · Sit to Stand:  Moderate assistance and cues with standard walker  Balance: Standing with Std Walker Static: Poor+ for  1 minute and  20 seconds(1st attempt); 35 seconds(2nd attempt). woth Moderate Assistance and cues    · 11/16 Scooting: minimum assistance  · Bridging: minimum assistance  · Supine to Sit: minimum assistance  · Sit to Supine: moderate assistance  · Transfers:     · Sit to Stand:  minimum assistance and moderate assistance with standard walker  Balance: Modified independent with static sitting at edge of bed, min assist with static standing with SW     11/18 - Sit to Stand:  moderate assiotance and cues with standard walker  · Balance: Standing with std walker Static: Fair for ~50 seconds tolerance(1st attempt) ; ~10 seconds tolerance(2nd attempt)  11/19 pt was able stand up and transfer  to Bedside commode for the first time today      · 11/23 Sit to Stand:  minimum assistance with rolling walker.  Balance: sitting good, standing Poor    11/30 she was able to get from laying to sitting to standing and transfer to bedside commode today with stand by assistance!!!!

## 2020-11-30 NOTE — ASSESSMENT & PLAN NOTE
Increase lisinopril 2.5>10mg daily  10/23 SBP 160s at times; lisinopril just increased will give more time for lisinopril to work before titration  10/26 SBP 140s; cont to monitor  10/28 /80- increase lisinopril  10/30 BP much better 119/59- 138/62  .  11/2 /58.  VSS   11/9 /56- well controlled (cont lisinopril, lasix, isosorbide, metoprolol).  11/23 112/53 well controlled  11/30 /57

## 2020-11-30 NOTE — PLAN OF CARE
Problem: Bariatric Environmental Safety  Goal: Safety Maintained with Care  Outcome: Ongoing, Progressing     Problem: Fluid Imbalance (Pneumonia)  Goal: Fluid Balance  Outcome: Ongoing, Progressing     Problem: Infection (Pneumonia)  Goal: Resolution of Infection Signs/Symptoms  Outcome: Ongoing, Progressing     Problem: Infection  Goal: Infection Symptom Resolution  Outcome: Ongoing, Progressing     Problem: Skin Injury Risk Increased  Goal: Skin Health and Integrity  Outcome: Ongoing, Progressing      Pt doing well. No question/concerns at this time. Agrees with poc. No falls.  Pain relieved with po med's.Vital signs pulse ox and temp are stable. She is working with pt and ot.  Her Appetite is good. Colace given today for BM which she did have. Ct of spine also done today. Still on iv vancomycin every 24hours.  Attempted to call [facility] for virtual appt. Left message for someone to call me back  d/t [facility]   does not have virtual appts available

## 2020-11-30 NOTE — TELEPHONE ENCOUNTER
Spoke with Tiffani @ [facility] informing her patient is scheduled for 12/14/20 @ 3 pm Virtual Visit. Tiffani verbalized understanding.

## 2020-11-30 NOTE — PROGRESS NOTES
Pharmacokinetic Assessment Follow Up: IV Vancomycin    Vancomycin serum concentration assessment(s):    The trough level was drawn correctly and can be used to guide therapy at this time. The measurement is below the desired definitive target range of 15 to 20 mcg/mL.    Vancomycin Regimen Plan:    Change regimen to Vancomycin 1000 mg IV every 24 hours with next serum trough concentration measured at 1900 prior to 3rd dose on 12-2-20    Drug levels (last 3 results):  Recent Labs   Lab Result Units 11/29/20  1905   Vancomycin-Trough ug/mL 10.1       Pharmacy will continue to follow and monitor vancomycin.    Please contact pharmacy at extension [phone] for questions regarding this assessment.    Thank you for the consult,   Efrem Em       Patient brief summary:  Martina Betancourt is a 70 y.o. female initiated on antimicrobial therapy with IV Vancomycin for treatment of bone/joint infection    The patient's current regimen is Vancomycin 750 mg IVPB q24h    Drug Allergies:   Review of patient's allergies indicates:   Allergen Reactions    Hydroxyzine hcl     Tizanidine        Actual Body Weight:   125.2 kg    Renal Function:   Estimated Creatinine Clearance: 66.6 mL/min (based on SCr of 1.1 mg/dL).,     Dialysis Method (if applicable):  N/A    CBC (last 72 hours):  No results for input(s): WHITE BLOOD CELL COUNT, HEMOGLOBIN, HEMATOCRIT, PLATELETS, GRAN%, LYMPH%, MONO%, EOSINOPHIL%, BASOPHIL%, DIFFERENTIAL METHOD in the last 72 hours.    Metabolic Panel (last 72 hours):  Recent Labs   Lab Result Units 11/27/20  0537 11/28/20  0543 11/29/20  0603   Sodium mmol/L 138 139 139   Potassium mmol/L 3.7 3.8 4.2   Chloride mmol/L 100 100 100   CO2 mmol/L 28 29 29   Glucose mg/dL 89 87 85   BUN mg/dL 16 13 12   Creatinine mg/dL 1.2 1.0 1.1       Vancomycin Administrations:  vancomycin given in the last 96 hours                     vancomycin 750 mg in dextrose 5 % 250 mL IVPB (ready to mix system) (mg) 750 mg New Bag 11/28/20  2142     750 mg New Bag 11/27/20 1905     750 mg New Bag 11/26/20 1932     750 mg New Bag 11/25/20 2040                    Microbiologic Results:  Microbiology Results (last 7 days)       ** No results found for the last 168 hours. **

## 2020-11-30 NOTE — ASSESSMENT & PLAN NOTE
vanc 1450mg IV every 24hr x 8 weeks total till 12/3 per ID ; will stay here for the duration   PT  vanc now 1000mg IV every 24hr till 12/3 per ID last vanc trough 10/31 15.8    Reach out to ns today for guidance on re-imaging  Noted per ID Plan for 6 - 8 weeks of IV antibiotics with repeat MRI at midpoint of treatment as no surgical drainage undertaken.  - ID will follow.  She is currently midpoint; will plan for MRI of thoracic on Monday .  Cont vanc now 750mg IV daily with trough 11/7 16.3 .  11/13 remains max assist; can stand for 1m 20sec; awaiting ID assessment of MRI    11/11 contacted neurosurgery for repeat MRI review. She is only able to stand at bedside with therapy for 1min and 15 sec with mod to max assist. She would be a difficult transfer at this point.   11/16 will repeat message to neurosurgery as well as ID to eval patient maybe virtual visit. She would be a very difficult transfer for in person visit  11/18 no response from ID ; pt set up for [facility] for OP appnt with ID today   11/20 Still waiting on ID recs, multiple messages sent to neurosurgery; Georgina Morrison and Luis E Calabrese as well as ID; Neftali Rm. We are trying to follow discharge plan from acute stay with repeat MRI and ID follow up and awaiting recommendations. Cont current treatment pending recs    11/23 Dr juan discussed case with Dr Rm and recs to cont plan with vanc till 12/3.    11/30 Dr Rm ID cont vanc till 12/3 to complete 6 weeks IV abx            Jin VASQUES recommends cont abx as well as repeat CT- thoracic -- scheduled today   Also scheduled virtual f/u with Dr Calabrese 12/7- asked nurse to set up today

## 2020-11-30 NOTE — TELEPHONE ENCOUNTER
----- Message from Virginia Oliver sent at 11/30/2020  3:10 PM CST -----  Contact: Sherri [facility]    [phone]  Calling to schedule a 1 week hospital discharge for 12-7-20.  Would like a Virtual appt.  Pls call.

## 2020-11-30 NOTE — PT/OT/SLP PROGRESS
Occupational Therapy   Treatment    Name: Martina Betancourt  MRN: [id number]  Admitting Diagnosis:  Debility       Recommendations:     Discharge Recommendations: nursing facility, basic  Discharge Equipment Recommendations:  lift device  Barriers to discharge:       Assessment:     Martina Betancourt is a 70 y.o. female with a medical diagnosis of Debility.  She presents with good participation in toilet transfer to [facility] with SBA scooting. She continues to require assistance for lower body clothing mamangement and wiping during toileting.     Performance deficits affecting function are weakness, impaired endurance, impaired cognition, impaired self care skills, impaired functional mobilty, decreased lower extremity function, gait instability, decreased safety awareness, impaired balance, impaired cardiopulmonary response to activity.     Rehab Prognosis:  Fair; patient would benefit from acute skilled OT services to address these deficits and reach maximum level of function.       Plan:     Patient to be seen 5 x/week to address the above listed problems via self-care/home management, therapeutic activities, therapeutic exercises  · Plan of Care Expires: 12/04/20  · Plan of Care Reviewed with: patient    Subjective     Pain/Comfort:  · Pain Rating 1: 5/10  · Location - Side 1: Right  · Location - Orientation 1: lower  · Location 1: back  · Pain Addressed 1: Reposition, Cessation of Activity  · Pain Rating Post-Intervention 1: 2/10    Objective:     Communicated with: Nursing prior to session.  Patient found supine with telemetry, PureWick, peripheral IV upon OT entry to room.    General Precautions: Standard, fall   Orthopedic Precautions:N/A   Braces: N/A     Occupational Performance:     Bed Mobility:    · Patient completed Rolling/Turning to Right with modified independence  · Patient completed Scooting/Bridging with modified independence  · Patient completed Supine to Sit with modified  independence  · Patient completed Sit to Supine with minimum assistance     Functional Mobility/Transfers:  · Patient completed Toilet Transfer Scoot Pivot technique with stand by assistance with  drop arm commode  · Functional Mobility: not completed    Activities of Daily Living:  · Feeding:  modified independence    · Toileting: maximal assistance for wiping bed level with BM      [facility] 6 Click ADL: 19    Treatment & Education:  Therapist facilitated drop arm commode transfer with verbal cues for positioning and proper body mechanics with SBA onto and off of drop arm commode, noted improvement. Patient continues to require Max A for toileting/ wiping bed level after BM.    Patient left HOB elevated with all lines intact and call button in reachEducation:      GOALS:   Multidisciplinary Problems     Occupational Therapy Goals        Problem: Occupational Therapy Goal    Goal Priority Disciplines Outcome Interventions   Occupational Therapy Goal     OT, PT/OT Ongoing, Progressing    Description: Goals to be met by: 12/4/2020     Patient will increase functional independence with ADLs by performing:    Feeding with Supervision. (GOAL MET)  UE Dressing with Supervision (GOAL MET).  LE Dressing with Moderate Assistance (GOAL MET).  Grooming while seated with Supervision (GOAL MET).  Sitting at edge of bed x5 minutes with Supervision. (GOAL MET)  Rolling to Bilateral with Supervision. (GOAL MET)  Supine to sit with Minimal Assistance. (GOAL MET)  Upper extremity exercise program x10 reps per handout, with assistance as needed. (GOAL MET)  Toilet transfer to bedside commode with Minimal Assistance. (GOAL MET)    Squat pivot transfers with Minimal Assistance.  Toileting from bedside commode with Minimal Assistance for hygiene and clothing management.                    Time Tracking:     OT Date of Treatment: 11/30/20  OT Start Time: 0830  OT Stop Time: 0857  OT Total Time (min): 27 min    Billable Minutes:Self Care/Home  Management 27 minutes    Golden Bartlett, OT  11/30/2020

## 2020-11-30 NOTE — PROGRESS NOTES
Nursing Notes  Bedside report received from LORAINE Holliday. Patient AAOx4. Denies pain. Asked to call for assistance.      Huddle Comments

## 2020-11-30 NOTE — PT/OT/SLP PROGRESS
Physical Therapy      Patient Name:  Martina Betancourt   MRN:  [id number]    Patient not seen today secondary to Patient unwilling to participate(Patient just got back from CT SCAN and  not feeling well.). Will follow-up tomorrow 11/30/20.    Edgar Lamas, PT

## 2020-11-30 NOTE — PLAN OF CARE
Problem: Adult Inpatient Plan of Care  Goal: Plan of Care Review  Outcome: Ongoing, Progressing  Goal: Patient-Specific Goal (Individualization)  Outcome: Ongoing, Progressing  Goal: Absence of Hospital-Acquired Illness or Injury  Outcome: Ongoing, Progressing  Goal: Optimal Comfort and Wellbeing  Outcome: Ongoing, Progressing  Goal: Readiness for Transition of Care  Outcome: Ongoing, Progressing  Goal: Rounds/Family Conference  Outcome: Ongoing, Progressing     Problem: Bariatric Environmental Safety  Goal: Safety Maintained with Care  Outcome: Ongoing, Progressing     Problem: Fluid Imbalance (Pneumonia)  Goal: Fluid Balance  Outcome: Ongoing, Progressing     Problem: Infection (Pneumonia)  Goal: Resolution of Infection Signs/Symptoms  Outcome: Ongoing, Progressing     Problem: Respiratory Compromise (Pneumonia)  Goal: Effective Oxygenation and Ventilation  Outcome: Ongoing, Progressing     Problem: Infection  Goal: Infection Symptom Resolution  Outcome: Ongoing, Progressing     Problem: Wound  Goal: Optimal Wound Healing  Outcome: Ongoing, Progressing     Problem: Fall Injury Risk  Goal: Absence of Fall and Fall-Related Injury  Outcome: Ongoing, Progressing     Problem: Skin Injury Risk Increased  Goal: Skin Health and Integrity  Outcome: Ongoing, Progressing    Vancmycin trough collected tonight, dosage adjusted accordingly. PICC to OTTO. Red port unable to flush. Purple port flushed without difficulty, blood return noted. Dressing change to be done 12/2. PRN pain medication given for lower back pain. PRN stool softener given. Afebrile. Purwick in place.

## 2020-11-30 NOTE — PROGRESS NOTES
[facility] Medicine  Progress Note    Patient Name: Martina Betancourt  MRN: [id number]  Patient Class: IP- Swing   Admission Date: 10/20/2020  Length of Stay: 41 days  Attending Physician: Vamsi Manuel MD  Primary Care Provider: Joe Fuller Iii, MD        Subjective:     Principal Problem:Debility        HPI:  71yo female patient with hx of alzheiners, CAD, HLD, HTN, myopathy, MI, obesity, sleep apnea and OA (knees non ambulatory uses gonzalez round). She was living at home with her daughter. Recently admitted to [facility] with MRSA bacteremia and subsequent pneumonia treated with Zyvox x 7 days with clearance of her bacteremia now admitted with back pain found to have T9-10 osteo discitis, T8-10 epidural phlegmon with associated paraverterbral abscesses. Spine infection likely hematogenous from under treated bacteremia. Neurosurgery has been consulted. She has been on Vancomycin and Ceftriaxone. Underwent T9-10 disc space biopsy with IR on 10/16. Cultures negative, though had been on IV antibiotics multiple days prior. Afebrile. HDS. Repeat blood cx sterile. ID rec cont vanc 1750mg q 24 till 12/3.     Overview/Hospital Course:  10/23 Here for skilled ; needing IV abx for spinal abscess;  vanc 1750mg q 24 till 12/3  Afebrile , 3LNC - O2 sat 95%   + debility, very deconditioned; bilt LE x 10 reps followed by bed mobility trng and static sit balance and tolerance at side of the bed  C/o back pain with activity; Occupational therapist notes that she is very resistant to movement and c/o severe pain with minimal activity.   Will order toradol 15mg x 1dose IV; pt did much better after toradol rx today per OT. Will order daily prior to OT as tolerated  Monitor renal fx     10/26 here for skilled therapy and cont IV abc. Vanc 1750mg iv every 12hr. Noted elevated WBC this am and she report that she has had diarrhea for the last 4 days.   · PT reports Supine to Sit: maximal assistance, of 2  persons and patient able to reach with UE to push through rail and with improved push off with UE and initiation  · Sit to Supine: maximal assistance of 2 people with improved ability of patient to control descent of upper body  · Transfers:     · Sit to Stand:  moderate assistance, maximal assistance of 2 persons with no AD.  PT and PT tech blocking both knees while assisting pt to elevate hips from bed  Balance: pt able to stand x15 seconds EOB limited largely by knee and back pain.       10/28  She is still on vanc IV daily. No longer with diarrhea. Did have heme positive stools H/H stable on lovenox for DVT prophylaxis and plavix. Will monitor h/h M/Thurs. No fever. No elevated WBC  pt is really immobile.  · Bed Mobility:     · Rolling Left:  moderate assistance  · Rolling Right: moderate assistance  · Scooting: maximal assistance  · Supine to Sit: maximum/moderate assistace and cues  · Sit to Supine: maximum assistace x 1-2 and cues  · Transfers:     · Sit to Stand:  maximum assistance and cues inside the parallel bars with facilitation tech  · Bed to Chair: to wheelchair  with  maximum assistance and cues  using  Slide Board  · Balance: Standing Static inside the parallel bars with Poor grade. Tolerated for ~10 seconds with 2 attempts with  Maximum assistance and cues.  Cont PT daily  10/30  IV  vanc 1,000mg q 24hr x 8 weeks total till 12/3 per ID for discitis; random vanc 12.5 yesterday    No longer with diarrhea. Did have heme positive stools H/H stable on lovenox for DVT prophylaxis and plavix. H&H stable, lovenox stopped. No fever. No elevated WBC. She has productive cough this am. K+ 3.2 yesterday, getting lasix, will repeat KCL 40meq today   pt is really immobile.Standing with RW Static: Poor for ~15 seconds tolerance with max Assistance and cues  Has PICC line- one port clotted;       11/2 she remains on vanc 1000mg  q 24hr x 8 weeks total till 12/3 per ID for discitis. Vanc trough 15.8 10/31/20.  Vss/afebrile. intermittent back pain with prn pain meds helping. She is not doing much with PT. She uses gonzalez round at home but can transfer independently. Here she is max assist     11/4 Remains on vanc 1000mg  q 24hr x 8 weeks total till 12/3 per ID for discitis. Vanc trough 16.6 on 11/2   Afebrile , having regular BMs; getting Norco 10mg q 6   · Continues to require max assist; Sit to Stand:  Max/moderate assistance and cues with standard walker  Balance: Static Stand with Std Walker: Fair- with 2 mins and 7 seconds  tolerance and max/moderate assistance and cues  11/4 Remains on vanc 1000mg  q 24hr x 8 weeks total till 12/3 per ID for discitis. Vanc trough 16.6 on 11/2   Afebrile, WBC nml, creat stable , K+3.4  Requires maximal assist but finally standing with PT with walker .    11/9/20    Remains on vanc 1000mg  q 24hr x 8 weeks total till 12/3 per ID for discitis. VSS/afebrile. No elevated WBC. Last vanc trough 16.3 11/7  She is progressing with PT now min assist from sit to supine and mod assist sit to stand with RW,    11/11 remains on vanc 750mg IV every 24hr, dose monitored by pharm. Last vanc trough 16.5 11/9. Repeat MRI done Monday. Contact with neurosurgery sent to discuss comparison with original johny since she is mid way on IV abx and had no intervention. She has no comoplaints. VSS/labs reviewed. She is standing at bedside 1min and 15 sec with mod assist    11/13 remains on vanc 750mg IV every 24hr, dose monitored by pharm. Last vanc trough 14.8  11/11. Repeat MRI done Monday; mid tx as per ID recommendations;  messaged ID, shows severe Degenerative disease of spine   Able to stand now for over 1m with PT     11/16 remains on vanc every 24hr 750mg. Last vanc trough was 15.9 yesterday. Afebrile, no elevated WBC. She reports that she is feeling well. Getting stronger. Working with PT. Min assist with bed mobility and mod with standing.     11/18/20 planned for OP visit with ID this am   Remains on  "vanc every 24hr 750mg. Last vanc trough was 16.8  yesterday. Afebrile, no elevated WBC    11/20/20 Remains on vanc 1000mg  q 24hr x 8 weeks total till 12/3 per ID for discitis. VSS/afebrile. No elevated WBC. Last vanc trough 19.2 yesterday ;  Went to appnt with ID 11/18; she reports they told her she still has "three pockets of infection"; however, the note is incomplete and does not provide further recommendations;message sent in Epic this am.   11/20 pt was able stand up and transfer  to Bedside commode for the first time today, also had a BM   ESR/CRP pending otherwise labs stable    11/23/20 pt remains on Vanc 750mg IV every 24hr till 12/3. ID saw patient last week and recommends continued IV abx through the coarse. Neuro surgery has been set up for virtual visit this am. crp improved 139>16 sed rate 44. Labs and VSS. She is min to contact assist with getting up. She is not ambulating though. Case management working with patient and daughter about her d/c plan.     11/25/20 Remains on vanc 1000mg  q 24hr x 8 weeks total till 12/3 per ID for discitisID saw patient last week and recommends continued IV abx through the coarse. Vanc trough 16.3 yesteray; Had virtual visit with neurology yesterday   Standing with Std Walker Static: Fair- and tolerated for 1 min and 15 seconds(1st attempt) ; 45 seconds (2nd attempt)  Pt reports on virtual visit that she will need repeat MRI next week. Still waiting these notes and recs     11/27 vanc 1000mg  q 24hr x 8 weeks total till 12/3 per ID for discitisID saw patient last week and recommends continued IV abx through the coarse. Vanc trough 16.2 yesteray; Renal fx stable    Had virtual visit with neurology; plan CT thoracic spine for further evaluation of bone quality; did not document timing of CT?   - Follow-up with Dr. Monteiro within the next 2 weeks for further discussion, may be virtual visit  - Continue Vancomycin through 12/3 per ID recs    11/30 she is doing very well. " This am she has OT at bedside. She was able to get from bed to bedside commode with stand by assist!!! She remains on vanc 1000mg IV every 24hr. Last vanc trough was 10.1 yesterday. No fever. VSS.       Review of Systems   Constitutional: Positive for activity change. Negative for chills and fever.   Cardiovascular: Negative for chest pain.   Gastrointestinal: Negative for abdominal pain.   Musculoskeletal: Positive for back pain.   Neurological: Positive for weakness.   Psychiatric/Behavioral: Negative for confusion.        Frustrated with prolonged illness      Objective:     Vital Signs (Most Recent):  Temp: 97.9 °F (36.6 °C) (11/30/20 0805)  Pulse: 68 (11/30/20 0805)  Resp: 20 (11/30/20 0805)  BP: (!) 115/57 (11/30/20 0805)  SpO2: (!) 94 % (11/30/20 0811) Vital Signs (24h Range):  Temp:  [97.7 °F (36.5 °C)-97.9 °F (36.6 °C)] 97.9 °F (36.6 °C)  Pulse:  [65-68] 68  Resp:  [18-20] 20  SpO2:  [94 %-96 %] 94 %  BP: (112-115)/(56-57) 115/57     Weight: 127.1 kg (280 lb 3.3 oz)  Body mass index is 40.21 kg/m².    Intake/Output Summary (Last 24 hours) at 11/30/2020 0851  Last data filed at 11/30/2020 0554  Gross per 24 hour   Intake 769 ml   Output 2200 ml   Net -1431 ml      Physical Exam  Vitals signs and nursing note reviewed.   Constitutional:       General: She is not in acute distress.     Appearance: She is well-developed. She is obese.   HENT:      Head: Normocephalic and atraumatic.      Nose: Nose normal.      Mouth/Throat:      Mouth: Mucous membranes are moist.      Pharynx: Oropharynx is clear.   Eyes:      Extraocular Movements: Extraocular movements intact.      Conjunctiva/sclera: Conjunctivae normal.      Pupils: Pupils are equal, round, and reactive to light.   Neck:      Musculoskeletal: Neck supple.      Thyroid: No thyromegaly.   Cardiovascular:      Rate and Rhythm: Normal rate and regular rhythm.      Pulses: Normal pulses.      Heart sounds: No murmur. No friction rub. No gallop.    Pulmonary:       Effort: Pulmonary effort is normal. No respiratory distress.      Breath sounds: No wheezing, rhonchi or rales.   Abdominal:      General: Bowel sounds are normal.      Palpations: Abdomen is soft.   Musculoskeletal:      Right lower leg: No edema.      Left lower leg: No edema.   Lymphadenopathy:      Cervical: No cervical adenopathy.   Skin:     General: Skin is warm and dry.   Neurological:      General: No focal deficit present.      Mental Status: She is alert.      Cranial Nerves: No cranial nerve deficit.      Motor: Weakness present.      Comments: Globally weak    Psychiatric:         Mood and Affect: Mood is depressed.         Behavior: Behavior normal.         Significant Labs: procal 0.04  BMP:   Recent Labs   Lab 11/30/20  0527 11/30/20  0528   GLU 95  --      --    K 3.8  --    CL 98  --    CO2 28  --    BUN 15  --    CREATININE 1.1  --    CALCIUM 10.0  --    MG  --  2.2   phos 3.7  Mag 1.9  Lab Results   Component Value Date    WBC 6.92 11/30/2020    HGB 10.7 (L) 11/30/2020    HCT 35.4 (L) 11/30/2020    MCV 91 11/30/2020     11/30/2020 11/20 >>16.2     11/20 sed rate 44   11/29 vanc trough 10.1     MRI thoracic spine   1. Altered signal intensity changes at T9/T10 with evidence of erosive focus about the opposing endplates and evidence of enhancing phlegmonous formation identified in the central component of the disc with central low signal intensity compatible with osteomyelitis/discitis.  Vertical dimensions of the area of interest cause extreme erosion along the anterior portion of the vertebral bodies of interest without evidence of any significant paraspinal component.  2. No significant soft tissue component.  3. Chronic degenerative spondylosis changes of the remaining segments of the thoracic spine.  4. Chronic degenerative changes of the entirety of the cervical spine.      Assessment/Plan:      * Debility  Was walking with walker prior to pneumonia/bacteremia admit  last month then after d.c was only w/c bound (gonzalez round) will add pt here and try and get her as strong as poss as she lives at home with family.   10/23 Chronic co back ache but also has recent high ankle fx ; omar Vinson NP Spoke with Ariela VASQUES who will see patient while on SWING. She looked over x rays and hx and reports that patient can weight bear as tolerated. Nurse/OT/MD notified    · 10/26 Supine to Sit: maximal assistance, of 2 persons and patient able to reach with UE to push through rail and with improved push off with UE and initiation  · Sit to Supine: maximal assistance of 2 people with improved ability of patient to control descent of upper body  · Transfers:     · Sit to Stand:  moderate assistance, maximal assistance of 2 persons with no AD.  PT and PT tech blocking both knees while assisting pt to elevate hips from bed  Balance: pt able to stand x15 seconds EOB limited largely by knee and back pain.     10/28.  · Bed Mobility:     · Rolling Left:  moderate assistance  · Rolling Right: moderate assistance  · Scooting: maximal assistance  · Supine to Sit: maximum/moderate assistace and cues  · Sit to Supine: maximum assistace x 1-2 and cues  · Transfers:     · Sit to Stand:  maximum assistance and cues inside the parallel bars with facilitation tech  · Bed to Chair: to wheelchair  with  maximum assistance and cues  using  Slide Board  · Balance: Standing Static inside the parallel bars with Poor grade. Tolerated for ~10 seconds with 2 attempts with  Maximum assistance and cues.  10/30 Standing with RW Static: Poor for ~15 seconds tolerance with max Assistance and cues  11/2  wheelchair maximum assistance x 2 and cues  with  slide board  using  slide/scoot tech  11/4 Sit to Stand:  Max/moderate assistance and cues with standard walker  Balance: Static Stand with Std Walker: Fair- with 2 mins and 7 seconds  tolerance and max/moderate assistance and cues  · 11/6 max/moderate assistance  and cues  with standard walker  Balance: Standing Static with std walker: Poor+ for 1 minute and 15 seconds(1st attempt); 45 seconds(2nd attempt)    11/9 she is becoming stronger  · Cont with PT  · Rolling Left:  stand by assistance  · Rolling Right: stand by assistance  · Scooting: stand by assistance  · Supine to Sit: contact guard assistance  · Sit to Supine: minimum assistance  · Transfers:     Sit to Stand:  moderate assistance with rolling walker.3    11/13/20 Transfers:     · Sit to Stand:  Moderate assistance and cues with standard walker  Balance: Standing with Std Walker Static: Poor+ for  1 minute and  20 seconds(1st attempt); 35 seconds(2nd attempt). woth Moderate Assistance and cues    · 11/16 Scooting: minimum assistance  · Bridging: minimum assistance  · Supine to Sit: minimum assistance  · Sit to Supine: moderate assistance  · Transfers:     · Sit to Stand:  minimum assistance and moderate assistance with standard walker  Balance: Modified independent with static sitting at edge of bed, min assist with static standing with SW     11/18 - Sit to Stand:  moderate assiotance and cues with standard walker  · Balance: Standing with std walker Static: Fair for ~50 seconds tolerance(1st attempt) ; ~10 seconds tolerance(2nd attempt)  11/19 pt was able stand up and transfer  to Bedside commode for the first time today      · 11/23 Sit to Stand:  minimum assistance with rolling walker.  Balance: sitting good, standing Poor    11/30 she was able to get from laying to sitting to standing and transfer to bedside commode today with stand by assistance!!!!    Diarrhea  Stools d/c as diarrhea has improved. + heme occult . lovenox stopped cbc stable     11/20: patient still with significant immobility. H/H stable and no gross bleeding. Will resume lovenox      Malnutrition of mild degree  Dietary..  Boost.      Hydronephrosis  Seen on CT 10/7 - u/a was negative .  Urinating without issue  No flank pain  No signs of  infection  No hematuria.      CAD (coronary artery disease)  Cont asa, plavix, lasix, isosorbide, lisinopril, toprol and statin      HTN (hypertension)  Increase lisinopril 2.5>10mg daily  10/23 SBP 160s at times; lisinopril just increased will give more time for lisinopril to work before titration  10/26 SBP 140s; cont to monitor  10/28 /80- increase lisinopril  10/30 BP much better 119/59- 138/62  .  11/2 /58.  VSS   11/9 /56- well controlled (cont lisinopril, lasix, isosorbide, metoprolol).  11/23 112/53 well controlled  11/30 /57     Discitis  vanc 1450mg IV every 24hr x 8 weeks total till 12/3 per ID ; will stay here for the duration   PT  vanc now 1000mg IV every 24hr till 12/3 per ID last vanc trough 10/31 15.8    Reach out to ns today for guidance on re-imaging  Noted per ID Plan for 6 - 8 weeks of IV antibiotics with repeat MRI at midpoint of treatment as no surgical drainage undertaken.  - ID will follow.  She is currently midpoint; will plan for MRI of thoracic on Monday .  Cont vanc now 750mg IV daily with trough 11/7 16.3 .  11/13 remains max assist; can stand for 1m 20sec; awaiting ID assessment of MRI    11/11 contacted neurosurgery for repeat MRI review. She is only able to stand at bedside with therapy for 1min and 15 sec with mod to max assist. She would be a difficult transfer at this point.   11/16 will repeat message to neurosurgery as well as ID to eval patient maybe virtual visit. She would be a very difficult transfer for in person visit  11/18 no response from ID ; pt set up for [facility] for OP appnt with ID today   11/20 Still waiting on ID recs, multiple messages sent to neurosurgery; Georgina Morrison and Luis E Calabrese as well as ID; Neftali Rm. We are trying to follow discharge plan from acute stay with repeat MRI and ID follow up and awaiting recommendations. Cont current treatment pending recs    11/23 Dr juan discussed case with Dr Rm and recs to  cont plan with vanc till 12/3.    11/30 Dr Rm ID cont vanc till 12/3 to complete 6 weeks IV abx            Jin VASQUES recommends cont abx as well as repeat CT- thoracic -- scheduled today   Also scheduled virtual f/u with Dr Calabrese 12/7- asked nurse to set up today    Severe obesity (BMI >= 40)  Using boost as she has low appetite and low protein .      Obstructive sleep apnea treated with continuous positive airway pressure (CPAP)  continue home cpap.      Dementia without behavioral disturbance  Cont namenda and aricept       VTE Risk Mitigation (From admission, onward)         Ordered     enoxaparin injection 40 mg  Every 24 hours      11/28/20 0952                Discharge Planning   BRIT: 12/3/2020     Code Status: Prior   Is the patient medically ready for discharge?:     Reason for patient still in hospital (select all that apply): Treatment  Discharge Plan A: Home with family, Home Health   Discharge Delays: None known at this time              Orestes Rehman MD  Department of Hospital Medicine   [facility]

## 2020-12-01 ENCOUNTER — HOSPITAL ENCOUNTER (INPATIENT)
Facility: HOSPITAL | Age: 70
LOS: 17 days | Discharge: NURSING, REHAB OR LONG-TERM CARE | DRG: 453 | End: 2020-12-18
Attending: NEUROLOGICAL SURGERY | Admitting: NEUROLOGICAL SURGERY
Payer: MEDICARE

## 2020-12-01 VITALS
TEMPERATURE: 98 F | WEIGHT: 276.44 LBS | HEART RATE: 70 BPM | RESPIRATION RATE: 20 BRPM | DIASTOLIC BLOOD PRESSURE: 59 MMHG | HEIGHT: 70 IN | BODY MASS INDEX: 39.58 KG/M2 | SYSTOLIC BLOOD PRESSURE: 121 MMHG | OXYGEN SATURATION: 96 %

## 2020-12-01 DIAGNOSIS — S22.009A THORACIC SPINE FRACTURE: ICD-10-CM

## 2020-12-01 DIAGNOSIS — S22.078G OTHER CLOSED FRACTURE OF NINTH THORACIC VERTEBRA WITH DELAYED HEALING, SUBSEQUENT ENCOUNTER: ICD-10-CM

## 2020-12-01 DIAGNOSIS — R78.81 MRSA BACTEREMIA: Primary | ICD-10-CM

## 2020-12-01 DIAGNOSIS — I21.4 NSTEMI (NON-ST ELEVATED MYOCARDIAL INFARCTION): ICD-10-CM

## 2020-12-01 DIAGNOSIS — M46.24 OSTEOMYELITIS OF THORACIC VERTEBRA: ICD-10-CM

## 2020-12-01 DIAGNOSIS — B95.62 MRSA BACTEREMIA: Primary | ICD-10-CM

## 2020-12-01 DIAGNOSIS — M46.44 DISCITIS OF THORACIC REGION: ICD-10-CM

## 2020-12-01 DIAGNOSIS — I25.10 CORONARY ARTERY DISEASE, ANGINA PRESENCE UNSPECIFIED, UNSPECIFIED VESSEL OR LESION TYPE, UNSPECIFIED WHETHER NATIVE OR TRANSPLANTED HEART: ICD-10-CM

## 2020-12-01 DIAGNOSIS — S22.078D OTHER CLOSED FRACTURE OF NINTH THORACIC VERTEBRA WITH ROUTINE HEALING, SUBSEQUENT ENCOUNTER: ICD-10-CM

## 2020-12-01 LAB
ANION GAP SERPL CALC-SCNC: 9 MMOL/L (ref 8–16)
BUN SERPL-MCNC: 13 MG/DL (ref 8–23)
CALCIUM SERPL-MCNC: 9.9 MG/DL (ref 8.7–10.5)
CHLORIDE SERPL-SCNC: 101 MMOL/L (ref 95–110)
CO2 SERPL-SCNC: 30 MMOL/L (ref 23–29)
CREAT SERPL-MCNC: 1 MG/DL (ref 0.5–1.4)
EST. GFR  (AFRICAN AMERICAN): >60 ML/MIN/1.73 M^2
EST. GFR  (NON AFRICAN AMERICAN): 56 ML/MIN/1.73 M^2
GLUCOSE SERPL-MCNC: 93 MG/DL (ref 70–110)
POTASSIUM SERPL-SCNC: 3.8 MMOL/L (ref 3.5–5.1)
SARS-COV-2 RDRP RESP QL NAA+PROBE: NEGATIVE
SODIUM SERPL-SCNC: 140 MMOL/L (ref 136–145)

## 2020-12-01 PROCEDURE — 99223 PR INITIAL HOSPITAL CARE,LEVL III: ICD-10-PCS | Mod: AI,,, | Performed by: NEUROLOGICAL SURGERY

## 2020-12-01 PROCEDURE — A4216 STERILE WATER/SALINE, 10 ML: HCPCS | Performed by: NURSE PRACTITIONER

## 2020-12-01 PROCEDURE — U0002 COVID-19 LAB TEST NON-CDC: HCPCS

## 2020-12-01 PROCEDURE — 63600175 PHARM REV CODE 636 W HCPCS: Performed by: FAMILY MEDICINE

## 2020-12-01 PROCEDURE — 25000003 PHARM REV CODE 250: Performed by: NURSE PRACTITIONER

## 2020-12-01 PROCEDURE — 11000004 HC SNF PRIVATE

## 2020-12-01 PROCEDURE — 20600001 HC STEP DOWN PRIVATE ROOM

## 2020-12-01 PROCEDURE — 99315 NF DSCHRG MGMT 30 MIN/LESS: CPT | Mod: ,,, | Performed by: FAMILY MEDICINE

## 2020-12-01 PROCEDURE — 99315 PR NURSING FAC DISCHRGE DAY,1-30 MIN: ICD-10-PCS | Mod: ,,, | Performed by: FAMILY MEDICINE

## 2020-12-01 PROCEDURE — 36415 COLL VENOUS BLD VENIPUNCTURE: CPT

## 2020-12-01 PROCEDURE — 27000221 HC OXYGEN, UP TO 24 HOURS

## 2020-12-01 PROCEDURE — 80048 BASIC METABOLIC PNL TOTAL CA: CPT

## 2020-12-01 PROCEDURE — 94761 N-INVAS EAR/PLS OXIMETRY MLT: CPT

## 2020-12-01 PROCEDURE — 99223 1ST HOSP IP/OBS HIGH 75: CPT | Mod: AI,,, | Performed by: NEUROLOGICAL SURGERY

## 2020-12-01 PROCEDURE — 97535 SELF CARE MNGMENT TRAINING: CPT

## 2020-12-01 PROCEDURE — 94799 UNLISTED PULMONARY SVC/PX: CPT

## 2020-12-01 RX ORDER — IBUPROFEN 200 MG
16 TABLET ORAL
Status: DISCONTINUED | OUTPATIENT
Start: 2020-12-01 | End: 2020-12-18 | Stop reason: HOSPADM

## 2020-12-01 RX ORDER — GLUCAGON 1 MG
1 KIT INJECTION
Status: DISCONTINUED | OUTPATIENT
Start: 2020-12-01 | End: 2020-12-18 | Stop reason: HOSPADM

## 2020-12-01 RX ORDER — LISINOPRIL 10 MG/1
10 TABLET ORAL DAILY
Qty: 30 TABLET | Refills: 0 | Status: ON HOLD
Start: 2020-12-01 | End: 2020-12-17 | Stop reason: HOSPADM

## 2020-12-01 RX ORDER — TALC
6 POWDER (GRAM) TOPICAL NIGHTLY PRN
Refills: 0 | Status: ON HOLD
Start: 2020-12-01 | End: 2020-12-17 | Stop reason: HOSPADM

## 2020-12-01 RX ORDER — IBUPROFEN 200 MG
24 TABLET ORAL
Status: DISCONTINUED | OUTPATIENT
Start: 2020-12-01 | End: 2020-12-18 | Stop reason: HOSPADM

## 2020-12-01 RX ORDER — SODIUM CHLORIDE 9 MG/ML
INJECTION, SOLUTION INTRAVENOUS CONTINUOUS
Status: DISCONTINUED | OUTPATIENT
Start: 2020-12-01 | End: 2020-12-02

## 2020-12-01 RX ORDER — SODIUM CHLORIDE 0.9 % (FLUSH) 0.9 %
10 SYRINGE (ML) INJECTION
Qty: 100 ML | Refills: 0 | Status: ON HOLD
Start: 2020-12-01 | End: 2020-12-17 | Stop reason: HOSPADM

## 2020-12-01 RX ORDER — ALUMINUM HYDROXIDE, MAGNESIUM HYDROXIDE, AND SIMETHICONE 2400; 240; 2400 MG/30ML; MG/30ML; MG/30ML
30 SUSPENSION ORAL EVERY 6 HOURS PRN
Refills: 0 | COMMUNITY
Start: 2020-12-01 | End: 2022-04-12

## 2020-12-01 RX ORDER — MUPIROCIN 20 MG/G
OINTMENT TOPICAL 2 TIMES DAILY
Status: COMPLETED | OUTPATIENT
Start: 2020-12-02 | End: 2020-12-06

## 2020-12-01 RX ORDER — MEMANTINE HYDROCHLORIDE 10 MG/1
10 TABLET ORAL 2 TIMES DAILY
Qty: 60 TABLET | Refills: 0 | Status: SHIPPED | OUTPATIENT
Start: 2020-12-01 | End: 2022-05-12 | Stop reason: SDUPTHER

## 2020-12-01 RX ORDER — ONDANSETRON 4 MG/1
4 TABLET, ORALLY DISINTEGRATING ORAL EVERY 8 HOURS PRN
Qty: 12 TABLET | Refills: 0
Start: 2020-12-01

## 2020-12-01 RX ORDER — CALCIUM CARBONATE 200(500)MG
500 TABLET,CHEWABLE ORAL 2 TIMES DAILY PRN
Status: ON HOLD | COMMUNITY
Start: 2020-12-01 | End: 2020-12-17 | Stop reason: HOSPADM

## 2020-12-01 RX ORDER — VANCOMYCIN HCL IN 5 % DEXTROSE 1G/250ML
1000 PLASTIC BAG, INJECTION (ML) INTRAVENOUS DAILY
Qty: 250 ML | Refills: 0 | Status: ON HOLD | OUTPATIENT
Start: 2020-12-01 | End: 2020-12-17 | Stop reason: SDUPTHER

## 2020-12-01 RX ORDER — INSULIN ASPART 100 [IU]/ML
0-5 INJECTION, SOLUTION INTRAVENOUS; SUBCUTANEOUS
Status: DISCONTINUED | OUTPATIENT
Start: 2020-12-01 | End: 2020-12-18 | Stop reason: HOSPADM

## 2020-12-01 RX ORDER — ENOXAPARIN SODIUM 100 MG/ML
40 INJECTION SUBCUTANEOUS DAILY
Qty: 4 ML | Refills: 0 | Status: ON HOLD
Start: 2020-12-01 | End: 2020-12-17 | Stop reason: HOSPADM

## 2020-12-01 RX ADMIN — METHOCARBAMOL TABLETS 500 MG: 500 TABLET, COATED ORAL at 07:12

## 2020-12-01 RX ADMIN — LISINOPRIL 10 MG: 10 TABLET ORAL at 07:12

## 2020-12-01 RX ADMIN — Medication 10 ML: at 01:12

## 2020-12-01 RX ADMIN — PANTOPRAZOLE SODIUM 40 MG: 40 TABLET, DELAYED RELEASE ORAL at 05:12

## 2020-12-01 RX ADMIN — DULOXETINE 60 MG: 30 CAPSULE, DELAYED RELEASE ORAL at 07:12

## 2020-12-01 RX ADMIN — ASPIRIN 81 MG: 81 TABLET, COATED ORAL at 07:12

## 2020-12-01 RX ADMIN — MICONAZOLE NITRATE: 20 OINTMENT TOPICAL at 07:12

## 2020-12-01 RX ADMIN — CALCIUM CARBONATE (ANTACID) CHEW TAB 500 MG 500 MG: 500 CHEW TAB at 08:12

## 2020-12-01 RX ADMIN — METHOCARBAMOL TABLETS 500 MG: 500 TABLET, COATED ORAL at 04:12

## 2020-12-01 RX ADMIN — ISOSORBIDE MONONITRATE 60 MG: 60 TABLET, EXTENDED RELEASE ORAL at 07:12

## 2020-12-01 RX ADMIN — CLOPIDOGREL 75 MG: 75 TABLET, FILM COATED ORAL at 07:12

## 2020-12-01 RX ADMIN — METHOCARBAMOL TABLETS 500 MG: 500 TABLET, COATED ORAL at 02:12

## 2020-12-01 RX ADMIN — METOPROLOL SUCCINATE 50 MG: 50 TABLET, EXTENDED RELEASE ORAL at 07:12

## 2020-12-01 RX ADMIN — FUROSEMIDE 40 MG: 40 TABLET ORAL at 07:12

## 2020-12-01 RX ADMIN — HYDROCODONE BITARTRATE AND ACETAMINOPHEN 1 TABLET: 10; 325 TABLET ORAL at 07:12

## 2020-12-01 RX ADMIN — HYDROCODONE BITARTRATE AND ACETAMINOPHEN 1 TABLET: 10; 325 TABLET ORAL at 08:12

## 2020-12-01 RX ADMIN — ACETAMINOPHEN 500 MG: 500 TABLET, FILM COATED ORAL at 02:12

## 2020-12-01 RX ADMIN — ENOXAPARIN SODIUM 40 MG: 40 INJECTION SUBCUTANEOUS at 07:12

## 2020-12-01 RX ADMIN — GUAIFENESIN 600 MG: 600 TABLET, EXTENDED RELEASE ORAL at 07:12

## 2020-12-01 RX ADMIN — MEMANTINE 10 MG: 10 TABLET ORAL at 07:12

## 2020-12-01 NOTE — ASSESSMENT & PLAN NOTE
vanc 1450mg IV every 24hr x 8 weeks total till 12/3 per ID ; will stay here for the duration   PT  vanc now 1000mg IV every 24hr till 12/3 per ID last vanc trough 10/31 15.8    Reach out to ns today for guidance on re-imaging  Noted per ID Plan for 6 - 8 weeks of IV antibiotics with repeat MRI at midpoint of treatment as no surgical drainage undertaken.  - ID will follow.  She is currently midpoint; will plan for MRI of thoracic on Monday .  Cont vanc now 750mg IV daily with trough 11/7 16.3 .  11/13 remains max assist; can stand for 1m 20sec; awaiting ID assessment of MRI    11/11 contacted neurosurgery for repeat MRI review. She is only able to stand at bedside with therapy for 1min and 15 sec with mod to max assist. She would be a difficult transfer at this point.   11/16 will repeat message to neurosurgery as well as ID to eval patient maybe virtual visit. She would be a very difficult transfer for in person visit  11/18 no response from ID ; pt set up for [facility] for OP appnt with ID today   11/20 Still waiting on ID recs, multiple messages sent to neurosurgery; Georgina Morrison and Luis E Calabrese as well as ID; Neftali Rm. We are trying to follow discharge plan from acute stay with repeat MRI and ID follow up and awaiting recommendations. Cont current treatment pending recs    11/23 Dr juan discussed case with Dr Rm and recs to cont plan with vanc till 12/3.    11/30 Dr Rm ID cont vanc till 12/3 to complete 6 weeks IV abx            Jin VASQUES recommends cont abx as well as repeat CT- thoracic -- scheduled today   Also scheduled virtual f/u with Dr Calabrese 12/7- asked nurse to set up today    12/1-Transfer patient to Dr. Colon's service due to worsening unstable thoracic fractures

## 2020-12-01 NOTE — PT/OT/SLP PROGRESS
Occupational Therapy   Treatment    Name: Martina Betancourt  MRN: [id number]  Admitting Diagnosis:  Debility       Recommendations:     Discharge Recommendations: nursing facility, basic  Discharge Equipment Recommendations:  lift device  Barriers to discharge:       Assessment:     Martina Betancourt is a 70 y.o. female with a medical diagnosis of Debility.  She presents with good participation in bed level bathing, dressing, and grooming. Performance deficits affecting function are weakness, impaired endurance, impaired cognition, impaired self care skills, decreased upper extremity function, impaired functional mobilty, decreased lower extremity function, gait instability, decreased safety awareness, pain, impaired balance, impaired cardiopulmonary response to activity.     Rehab Prognosis:  Fair; patient would benefit from acute skilled OT services to address these deficits and reach maximum level of function.       Plan:     Patient to be seen 5 x/week to address the above listed problems via self-care/home management, therapeutic activities, therapeutic exercises  · Plan of Care Expires: 12/04/20  · Plan of Care Reviewed with: patient    Subjective     Pain/Comfort:  · Pain Rating 1: 0/10    Objective:     Communicated with: Nursing prior to session.  Patient found supine with telemetry, PureWick, peripheral IV upon OT entry to room.    General Precautions: Standard, fall   Orthopedic Precautions:N/A   Braces: N/A     Occupational Performance:     Bed Mobility:    · Patient completed Rolling/Turning to Left with  modified independence  · Patient completed Rolling/Turning to Right with modified independence  · Patient completed Scooting/Bridging with modified independence  · Patient completed Supine to Sit with modified independence  · Patient completed Sit to Supine with minimum assistance     Functional Mobility/Transfers:  Not completed     Activities of Daily Living:  · Feeding:  modified independence  sitting  EOB to eat cereal  · Grooming: modified independence sitting EOB to comb hair  · Bathing: moderate assistance bed level  · Upper Body Dressing: modified independence  to don long sleeve shirt  · Lower Body Dressing: moderate assistance to don pants, diaper, and socks  · Toileting: maximal assistance bed level       [facility] 6 Click ADL: 19    Treatment & Education:  Therapist facilitated bed level ADLs with verbal cues for initiation and sequencing at times with good carryover of learned techniques for improved independence. Educated patient on carryover of independence and techniques for home, patient verbalized understanding      Patient left sitting EOB with all lines intact, call button in reach and nursing notifiedEducation:      GOALS:   Multidisciplinary Problems     Occupational Therapy Goals        Problem: Occupational Therapy Goal    Goal Priority Disciplines Outcome Interventions   Occupational Therapy Goal     OT, PT/OT Ongoing, Progressing    Description: Goals to be met by: 12/4/2020     Patient will increase functional independence with ADLs by performing:    Feeding with Supervision. (GOAL MET)  UE Dressing with Supervision (GOAL MET).  LE Dressing with Moderate Assistance (GOAL MET).  Grooming while seated with Supervision (GOAL MET).  Sitting at edge of bed x5 minutes with Supervision. (GOAL MET)  Rolling to Bilateral with Supervision. (GOAL MET)  Supine to sit with Minimal Assistance. (GOAL MET)  Upper extremity exercise program x10 reps per handout, with assistance as needed. (GOAL MET)  Toilet transfer to bedside commode with Minimal Assistance. (GOAL MET)    Squat pivot transfers with Minimal Assistance.  Toileting from bedside commode with Minimal Assistance for hygiene and clothing management.                    Time Tracking:     OT Date of Treatment: 12/01/20  OT Start Time: 0814  OT Stop Time: 0858  OT Total Time (min): 44 min    Billable Minutes:Self Care/Home Management 44  minutes    Golden Bartlett, OT  12/1/2020

## 2020-12-01 NOTE — PLAN OF CARE
Problem: Bariatric Environmental Safety  Goal: Safety Maintained with Care  Outcome: Ongoing, Progressing     Problem: Fluid Imbalance (Pneumonia)  Goal: Fluid Balance  Outcome: Ongoing, Progressing     Problem: Infection  Goal: Infection Symptom Resolution  Outcome: Ongoing, Progressing     Problem: Respiratory Compromise (Pneumonia)  Goal: Effective Oxygenation and Ventilation  Outcome: Ongoing, Progressing

## 2020-12-01 NOTE — PROGRESS NOTES
[facility] Medicine  Progress Note    Patient Name: Martina Betancourt  MRN: [id number]  Patient Class: IP- Swing   Admission Date: 10/20/2020  Length of Stay: 42 days  Attending Physician: Vamsi Manuel MD  Primary Care Provider: Joe Fuller Iii, MD        Subjective:     Principal Problem:Debility        HPI:  71yo female patient with hx of alzheiners, CAD, HLD, HTN, myopathy, MI, obesity, sleep apnea and OA (knees non ambulatory uses gonzalez round). She was living at home with her daughter. Recently admitted to [facility] with MRSA bacteremia and subsequent pneumonia treated with Zyvox x 7 days with clearance of her bacteremia now admitted with back pain found to have T9-10 osteo discitis, T8-10 epidural phlegmon with associated paraverterbral abscesses. Spine infection likely hematogenous from under treated bacteremia. Neurosurgery has been consulted. She has been on Vancomycin and Ceftriaxone. Underwent T9-10 disc space biopsy with IR on 10/16. Cultures negative, though had been on IV antibiotics multiple days prior. Afebrile. HDS. Repeat blood cx sterile. ID rec cont vanc 1750mg q 24 till 12/3.     Overview/Hospital Course:  10/23 Here for skilled ; needing IV abx for spinal abscess;  vanc 1750mg q 24 till 12/3  Afebrile , 3LNC - O2 sat 95%   + debility, very deconditioned; bilt LE x 10 reps followed by bed mobility trng and static sit balance and tolerance at side of the bed  C/o back pain with activity; Occupational therapist notes that she is very resistant to movement and c/o severe pain with minimal activity.   Will order toradol 15mg x 1dose IV; pt did much better after toradol rx today per OT. Will order daily prior to OT as tolerated  Monitor renal fx     10/26 here for skilled therapy and cont IV abc. Vanc 1750mg iv every 12hr. Noted elevated WBC this am and she report that she has had diarrhea for the last 4 days.   · PT reports Supine to Sit: maximal assistance, of 2  persons and patient able to reach with UE to push through rail and with improved push off with UE and initiation  · Sit to Supine: maximal assistance of 2 people with improved ability of patient to control descent of upper body  · Transfers:     · Sit to Stand:  moderate assistance, maximal assistance of 2 persons with no AD.  PT and PT tech blocking both knees while assisting pt to elevate hips from bed  Balance: pt able to stand x15 seconds EOB limited largely by knee and back pain.       10/28  She is still on vanc IV daily. No longer with diarrhea. Did have heme positive stools H/H stable on lovenox for DVT prophylaxis and plavix. Will monitor h/h M/Thurs. No fever. No elevated WBC  pt is really immobile.  · Bed Mobility:     · Rolling Left:  moderate assistance  · Rolling Right: moderate assistance  · Scooting: maximal assistance  · Supine to Sit: maximum/moderate assistace and cues  · Sit to Supine: maximum assistace x 1-2 and cues  · Transfers:     · Sit to Stand:  maximum assistance and cues inside the parallel bars with facilitation tech  · Bed to Chair: to wheelchair  with  maximum assistance and cues  using  Slide Board  · Balance: Standing Static inside the parallel bars with Poor grade. Tolerated for ~10 seconds with 2 attempts with  Maximum assistance and cues.  Cont PT daily  10/30  IV  vanc 1,000mg q 24hr x 8 weeks total till 12/3 per ID for discitis; random vanc 12.5 yesterday    No longer with diarrhea. Did have heme positive stools H/H stable on lovenox for DVT prophylaxis and plavix. H&H stable, lovenox stopped. No fever. No elevated WBC. She has productive cough this am. K+ 3.2 yesterday, getting lasix, will repeat KCL 40meq today   pt is really immobile.Standing with RW Static: Poor for ~15 seconds tolerance with max Assistance and cues  Has PICC line- one port clotted;       11/2 she remains on vanc 1000mg  q 24hr x 8 weeks total till 12/3 per ID for discitis. Vanc trough 15.8 10/31/20.  Vss/afebrile. intermittent back pain with prn pain meds helping. She is not doing much with PT. She uses gonzalez round at home but can transfer independently. Here she is max assist     11/4 Remains on vanc 1000mg  q 24hr x 8 weeks total till 12/3 per ID for discitis. Vanc trough 16.6 on 11/2   Afebrile , having regular BMs; getting Norco 10mg q 6   · Continues to require max assist; Sit to Stand:  Max/moderate assistance and cues with standard walker  Balance: Static Stand with Std Walker: Fair- with 2 mins and 7 seconds  tolerance and max/moderate assistance and cues  11/4 Remains on vanc 1000mg  q 24hr x 8 weeks total till 12/3 per ID for discitis. Vanc trough 16.6 on 11/2   Afebrile, WBC nml, creat stable , K+3.4  Requires maximal assist but finally standing with PT with walker .    11/9/20    Remains on vanc 1000mg  q 24hr x 8 weeks total till 12/3 per ID for discitis. VSS/afebrile. No elevated WBC. Last vanc trough 16.3 11/7  She is progressing with PT now min assist from sit to supine and mod assist sit to stand with RW,    11/11 remains on vanc 750mg IV every 24hr, dose monitored by pharm. Last vanc trough 16.5 11/9. Repeat MRI done Monday. Contact with neurosurgery sent to discuss comparison with original johny since she is mid way on IV abx and had no intervention. She has no comoplaints. VSS/labs reviewed. She is standing at bedside 1min and 15 sec with mod assist    11/13 remains on vanc 750mg IV every 24hr, dose monitored by pharm. Last vanc trough 14.8  11/11. Repeat MRI done Monday; mid tx as per ID recommendations;  messaged ID, shows severe Degenerative disease of spine   Able to stand now for over 1m with PT     11/16 remains on vanc every 24hr 750mg. Last vanc trough was 15.9 yesterday. Afebrile, no elevated WBC. She reports that she is feeling well. Getting stronger. Working with PT. Min assist with bed mobility and mod with standing.     11/18/20 planned for OP visit with ID this am   Remains on  "vanc every 24hr 750mg. Last vanc trough was 16.8  yesterday. Afebrile, no elevated WBC    11/20/20 Remains on vanc 1000mg  q 24hr x 8 weeks total till 12/3 per ID for discitis. VSS/afebrile. No elevated WBC. Last vanc trough 19.2 yesterday ;  Went to appnt with ID 11/18; she reports they told her she still has "three pockets of infection"; however, the note is incomplete and does not provide further recommendations;message sent in Epic this am.   11/20 pt was able stand up and transfer  to Bedside commode for the first time today, also had a BM   ESR/CRP pending otherwise labs stable    11/23/20 pt remains on Vanc 750mg IV every 24hr till 12/3. ID saw patient last week and recommends continued IV abx through the coarse. Neuro surgery has been set up for virtual visit this am. crp improved 139>16 sed rate 44. Labs and VSS. She is min to contact assist with getting up. She is not ambulating though. Case management working with patient and daughter about her d/c plan.     11/25/20 Remains on vanc 1000mg  q 24hr x 8 weeks total till 12/3 per ID for discitisID saw patient last week and recommends continued IV abx through the coarse. Vanc trough 16.3 yesteray; Had virtual visit with neurology yesterday   Standing with Std Walker Static: Fair- and tolerated for 1 min and 15 seconds(1st attempt) ; 45 seconds (2nd attempt)  Pt reports on virtual visit that she will need repeat MRI next week. Still waiting these notes and recs     11/27 vanc 1000mg  q 24hr x 8 weeks total till 12/3 per ID for discitisID saw patient last week and recommends continued IV abx through the coarse. Vanc trough 16.2 yesteray; Renal fx stable    Had virtual visit with neurology; plan CT thoracic spine for further evaluation of bone quality; did not document timing of CT?   - Follow-up with Dr. Monteiro within the next 2 weeks for further discussion, may be virtual visit  - Continue Vancomycin through 12/3 per ID recs    11/30 she is doing very well. " This am she has OT at bedside. She was able to get from bed to bedside commode with stand by assist!!! She remains on vanc 1000mg IV every 24hr. Last vanc trough was 10.1 yesterday. No fever. VSS.     12/1-  Dr. Monteiro's TOYA just call me tell me that the CT scan of the thoracic spine shows several new fractures and she now has an unstable thoracic spine issue that will need to be corrected.  They recommend patient be transferred back to [facility].  Discussed the situation with the patient and she agrees to go      Review of Systems   Constitutional: Positive for activity change. Negative for chills and fever.   Cardiovascular: Negative for chest pain.   Gastrointestinal: Negative for abdominal pain.   Musculoskeletal: Positive for back pain.   Neurological: Positive for weakness.   Psychiatric/Behavioral: Negative for confusion.        Frustrated with prolonged illness      Objective:     Vital Signs (Most Recent):  Temp: 98.1 °F (36.7 °C) (12/01/20 0704)  Pulse: 73 (12/01/20 0704)  Resp: 20 (12/01/20 0704)  BP: (!) 121/55 (12/01/20 0704)  SpO2: 95 % (12/01/20 0705) Vital Signs (24h Range):  Temp:  [97.8 °F (36.6 °C)-98.1 °F (36.7 °C)] 98.1 °F (36.7 °C)  Pulse:  [70-73] 73  Resp:  [18-20] 20  SpO2:  [95 %-96 %] 95 %  BP: (102-121)/(50-55) 121/55     Weight: 125.4 kg (276 lb 7.3 oz)  Body mass index is 39.67 kg/m².    Intake/Output Summary (Last 24 hours) at 12/1/2020 1020  Last data filed at 12/1/2020 0600  Gross per 24 hour   Intake 1230 ml   Output 1400 ml   Net -170 ml      Physical Exam  Vitals signs and nursing note reviewed.   Constitutional:       General: She is not in acute distress.     Appearance: She is well-developed. She is obese.   HENT:      Head: Normocephalic and atraumatic.      Nose: Nose normal.      Mouth/Throat:      Mouth: Mucous membranes are moist.      Pharynx: Oropharynx is clear.   Eyes:      Extraocular Movements: Extraocular movements intact.      Conjunctiva/sclera:  Conjunctivae normal.      Pupils: Pupils are equal, round, and reactive to light.   Neck:      Musculoskeletal: Neck supple.      Thyroid: No thyromegaly.   Cardiovascular:      Rate and Rhythm: Normal rate and regular rhythm.      Pulses: Normal pulses.      Heart sounds: No murmur. No friction rub. No gallop.    Pulmonary:      Effort: Pulmonary effort is normal. No respiratory distress.      Breath sounds: No wheezing, rhonchi or rales.   Abdominal:      General: Bowel sounds are normal.      Palpations: Abdomen is soft.   Musculoskeletal:         General: Tenderness present.      Right lower leg: No edema.      Left lower leg: No edema.      Comments: Thoracic area   Lymphadenopathy:      Cervical: No cervical adenopathy.   Skin:     General: Skin is warm and dry.   Neurological:      General: No focal deficit present.      Mental Status: She is alert.      Cranial Nerves: No cranial nerve deficit.      Motor: Weakness present.      Comments: Globally weak    Psychiatric:         Mood and Affect: Mood is depressed.         Behavior: Behavior normal.         Significant Labs: procal 0.04  BMP:   Recent Labs   Lab 11/30/20  0528 12/01/20  0547   GLU  --  93   NA  --  140   K  --  3.8   CL  --  101   CO2  --  30*   BUN  --  13   CREATININE  --  1.0   CALCIUM  --  9.9   MG 2.2  --    phos 3.7  Mag 1.9  Lab Results   Component Value Date    WBC 6.92 11/30/2020    HGB 10.7 (L) 11/30/2020    HCT 35.4 (L) 11/30/2020    MCV 91 11/30/2020     11/30/2020 11/20 >>16.2     11/20 sed rate 44   11/29 vanc trough 10.1     MRI thoracic spine   1. Altered signal intensity changes at T9/T10 with evidence of erosive focus about the opposing endplates and evidence of enhancing phlegmonous formation identified in the central component of the disc with central low signal intensity compatible with osteomyelitis/discitis.  Vertical dimensions of the area of interest cause extreme erosion along the anterior portion of  the vertebral bodies of interest without evidence of any significant paraspinal component.  2. No significant soft tissue component.  3. Chronic degenerative spondylosis changes of the remaining segments of the thoracic spine.  4. Chronic degenerative changes of the entirety of the cervical spine.    CT scan thoracic spine shows increased number of fractures resulting in an unstable thoracic spine.      Assessment/Plan:      * Debility  Was walking with walker prior to pneumonia/bacteremia admit last month then after d.c was only w/c bound (gonzalez round) will add pt here and try and get her as strong as poss as she lives at home with family.   10/23 Chronic co back ache but also has recent high ankle fx ; per EDDIE Vinson NP Spoke with Ariela VASQUES who will see patient while on SWING. She looked over x rays and hx and reports that patient can weight bear as tolerated. Nurse/OT/MD notified    · 10/26 Supine to Sit: maximal assistance, of 2 persons and patient able to reach with UE to push through rail and with improved push off with UE and initiation  · Sit to Supine: maximal assistance of 2 people with improved ability of patient to control descent of upper body  · Transfers:     · Sit to Stand:  moderate assistance, maximal assistance of 2 persons with no AD.  PT and PT tech blocking both knees while assisting pt to elevate hips from bed  Balance: pt able to stand x15 seconds EOB limited largely by knee and back pain.     10/28.  · Bed Mobility:     · Rolling Left:  moderate assistance  · Rolling Right: moderate assistance  · Scooting: maximal assistance  · Supine to Sit: maximum/moderate assistace and cues  · Sit to Supine: maximum assistace x 1-2 and cues  · Transfers:     · Sit to Stand:  maximum assistance and cues inside the parallel bars with facilitation tech  · Bed to Chair: to wheelchair  with  maximum assistance and cues  using  Slide Board  · Balance: Standing Static inside the parallel bars with  Poor grade. Tolerated for ~10 seconds with 2 attempts with  Maximum assistance and cues.  10/30 Standing with RW Static: Poor for ~15 seconds tolerance with max Assistance and cues  11/2  wheelchair maximum assistance x 2 and cues  with  slide board  using  slide/scoot tech  11/4 Sit to Stand:  Max/moderate assistance and cues with standard walker  Balance: Static Stand with Std Walker: Fair- with 2 mins and 7 seconds  tolerance and max/moderate assistance and cues  · 11/6 max/moderate assistance and cues  with standard walker  Balance: Standing Static with std walker: Poor+ for 1 minute and 15 seconds(1st attempt); 45 seconds(2nd attempt)    11/9 she is becoming stronger  · Cont with PT  · Rolling Left:  stand by assistance  · Rolling Right: stand by assistance  · Scooting: stand by assistance  · Supine to Sit: contact guard assistance  · Sit to Supine: minimum assistance  · Transfers:     Sit to Stand:  moderate assistance with rolling walker.3    11/13/20 Transfers:     · Sit to Stand:  Moderate assistance and cues with standard walker  Balance: Standing with Std Walker Static: Poor+ for  1 minute and  20 seconds(1st attempt); 35 seconds(2nd attempt). woth Moderate Assistance and cues    · 11/16 Scooting: minimum assistance  · Bridging: minimum assistance  · Supine to Sit: minimum assistance  · Sit to Supine: moderate assistance  · Transfers:     · Sit to Stand:  minimum assistance and moderate assistance with standard walker  Balance: Modified independent with static sitting at edge of bed, min assist with static standing with SW     11/18 - Sit to Stand:  moderate assiotance and cues with standard walker  · Balance: Standing with std walker Static: Fair for ~50 seconds tolerance(1st attempt) ; ~10 seconds tolerance(2nd attempt)  11/19 pt was able stand up and transfer  to Bedside commode for the first time today      · 11/23 Sit to Stand:  minimum assistance with rolling walker.  Balance: sitting good, standing  Poor    11/30 she was able to get from laying to sitting to standing and transfer to bedside commode today with stand by assistance!!!!    Diarrhea  Stools d/c as diarrhea has improved. + heme occult . lovenox stopped cbc stable     11/20: patient still with significant immobility. H/H stable and no gross bleeding. Will resume lovenox      Malnutrition of mild degree  Dietary..  Boost.      Hydronephrosis  Seen on CT 10/7 - u/a was negative .  Urinating without issue  No flank pain  No signs of infection  No hematuria.      CAD (coronary artery disease)  Cont asa, plavix, lasix, isosorbide, lisinopril, toprol and statin      HTN (hypertension)  Increase lisinopril 2.5>10mg daily  10/23 SBP 160s at times; lisinopril just increased will give more time for lisinopril to work before titration  10/26 SBP 140s; cont to monitor  10/28 /80- increase lisinopril  10/30 BP much better 119/59- 138/62  .  11/2 /58.  VSS   11/9 /56- well controlled (cont lisinopril, lasix, isosorbide, metoprolol).  11/23 112/53 well controlled  11/30 /57     Discitis  vanc 1450mg IV every 24hr x 8 weeks total till 12/3 per ID ; will stay here for the duration   PT  vanc now 1000mg IV every 24hr till 12/3 per ID last vanc trough 10/31 15.8    Reach out to ns today for guidance on re-imaging  Noted per ID Plan for 6 - 8 weeks of IV antibiotics with repeat MRI at midpoint of treatment as no surgical drainage undertaken.  - ID will follow.  She is currently midpoint; will plan for MRI of thoracic on Monday .  Cont vanc now 750mg IV daily with trough 11/7 16.3 .  11/13 remains max assist; can stand for 1m 20sec; awaiting ID assessment of MRI    11/11 contacted neurosurgery for repeat MRI review. She is only able to stand at bedside with therapy for 1min and 15 sec with mod to max assist. She would be a difficult transfer at this point.   11/16 will repeat message to neurosurgery as well as ID to eval patient maybe virtual  visit. She would be a very difficult transfer for in person visit  11/18 no response from ID ; pt set up for AYANNA for OP appnt with ID today   11/20 Still waiting on ID recs, multiple messages sent to neurosurgery; Georgina Morrison and Luis E Calabrese as well as ID; Neftali Rm. We are trying to follow discharge plan from acute stay with repeat MRI and ID follow up and awaiting recommendations. Cont current treatment pending recs    11/23 Dr juan discussed case with Dr Rm and recs to cont plan with vanc till 12/3.    11/30 Dr Rm ID cont vanc till 12/3 to complete 6 weeks IV abx            Jin VASQUES recommends cont abx as well as repeat CT- thoracic -- scheduled today   Also scheduled virtual f/u with Dr Calabrese 12/7- asked nurse to set up today    12/1-Transfer patient to Dr. Colon's service due to worsening unstable thoracic fractures        Severe obesity (BMI >= 40)  Using boost as she has low appetite and low protein .      Obstructive sleep apnea treated with continuous positive airway pressure (CPAP)  continue home cpap.      Dementia without behavioral disturbance  Cont namenda and aricept       VTE Risk Mitigation (From admission, onward)         Ordered     enoxaparin injection 40 mg  Every 24 hours      11/28/20 0952                Discharge Planning   BRIT: 12/3/2020     Code Status: Prior   Is the patient medically ready for discharge?:     Reason for patient still in hospital (select all that apply): Treatment  Discharge Plan A: Home with family, Home Health   Discharge Delays: None known at this time              Vamsi Manuel MD  Department of Hospital Medicine   [facility]

## 2020-12-01 NOTE — SUBJECTIVE & OBJECTIVE
Review of Systems   Constitutional: Positive for activity change. Negative for chills and fever.   Cardiovascular: Negative for chest pain.   Gastrointestinal: Negative for abdominal pain.   Musculoskeletal: Positive for back pain.   Neurological: Positive for weakness.   Psychiatric/Behavioral: Negative for confusion.        Frustrated with prolonged illness      Objective:     Vital Signs (Most Recent):  Temp: 98.1 °F (36.7 °C) (12/01/20 0704)  Pulse: 73 (12/01/20 0704)  Resp: 20 (12/01/20 0704)  BP: (!) 121/55 (12/01/20 0704)  SpO2: 95 % (12/01/20 0705) Vital Signs (24h Range):  Temp:  [97.8 °F (36.6 °C)-98.1 °F (36.7 °C)] 98.1 °F (36.7 °C)  Pulse:  [70-73] 73  Resp:  [18-20] 20  SpO2:  [95 %-96 %] 95 %  BP: (102-121)/(50-55) 121/55     Weight: 125.4 kg (276 lb 7.3 oz)  Body mass index is 39.67 kg/m².    Intake/Output Summary (Last 24 hours) at 12/1/2020 1020  Last data filed at 12/1/2020 0600  Gross per 24 hour   Intake 1230 ml   Output 1400 ml   Net -170 ml      Physical Exam  Vitals signs and nursing note reviewed.   Constitutional:       General: She is not in acute distress.     Appearance: She is well-developed. She is obese.   HENT:      Head: Normocephalic and atraumatic.      Nose: Nose normal.      Mouth/Throat:      Mouth: Mucous membranes are moist.      Pharynx: Oropharynx is clear.   Eyes:      Extraocular Movements: Extraocular movements intact.      Conjunctiva/sclera: Conjunctivae normal.      Pupils: Pupils are equal, round, and reactive to light.   Neck:      Musculoskeletal: Neck supple.      Thyroid: No thyromegaly.   Cardiovascular:      Rate and Rhythm: Normal rate and regular rhythm.      Pulses: Normal pulses.      Heart sounds: No murmur. No friction rub. No gallop.    Pulmonary:      Effort: Pulmonary effort is normal. No respiratory distress.      Breath sounds: No wheezing, rhonchi or rales.   Abdominal:      General: Bowel sounds are normal.      Palpations: Abdomen is soft.    Musculoskeletal:         General: Tenderness present.      Right lower leg: No edema.      Left lower leg: No edema.      Comments: Thoracic area   Lymphadenopathy:      Cervical: No cervical adenopathy.   Skin:     General: Skin is warm and dry.   Neurological:      General: No focal deficit present.      Mental Status: She is alert.      Cranial Nerves: No cranial nerve deficit.      Motor: Weakness present.      Comments: Globally weak    Psychiatric:         Mood and Affect: Mood is depressed.         Behavior: Behavior normal.         Significant Labs: procal 0.04  BMP:   Recent Labs   Lab 11/30/20  0528 12/01/20  0547   GLU  --  93   NA  --  140   K  --  3.8   CL  --  101   CO2  --  30*   BUN  --  13   CREATININE  --  1.0   CALCIUM  --  9.9   MG 2.2  --    phos 3.7  Mag 1.9  Lab Results   Component Value Date    WBC 6.92 11/30/2020    HGB 10.7 (L) 11/30/2020    HCT 35.4 (L) 11/30/2020    MCV 91 11/30/2020     11/30/2020 11/20 >>16.2     11/20 sed rate 44   11/29 vanc trough 10.1     MRI thoracic spine   1. Altered signal intensity changes at T9/T10 with evidence of erosive focus about the opposing endplates and evidence of enhancing phlegmonous formation identified in the central component of the disc with central low signal intensity compatible with osteomyelitis/discitis.  Vertical dimensions of the area of interest cause extreme erosion along the anterior portion of the vertebral bodies of interest without evidence of any significant paraspinal component.  2. No significant soft tissue component.  3. Chronic degenerative spondylosis changes of the remaining segments of the thoracic spine.  4. Chronic degenerative changes of the entirety of the cervical spine.    CT scan thoracic spine shows increased number of fractures resulting in an unstable thoracic spine.

## 2020-12-01 NOTE — ASSESSMENT & PLAN NOTE
Was walking with walker prior to pneumonia/bacteremia admit last month then after d.c was only w/c bound (gonzalez round) will add pt here and try and get her as strong as poss as she lives at home with family.   10/23 Chronic co back ache but also has recent high ankle fx ; per EDDIE Vinson NP Spoke with Ariela VASQUES who will see patient while on SWING. She looked over x rays and hx and reports that patient can weight bear as tolerated. Nurse/OT/MD notified    · 10/26 Supine to Sit: maximal assistance, of 2 persons and patient able to reach with UE to push through rail and with improved push off with UE and initiation  · Sit to Supine: maximal assistance of 2 people with improved ability of patient to control descent of upper body  · Transfers:     · Sit to Stand:  moderate assistance, maximal assistance of 2 persons with no AD.  PT and PT tech blocking both knees while assisting pt to elevate hips from bed  Balance: pt able to stand x15 seconds EOB limited largely by knee and back pain.     10/28.  · Bed Mobility:     · Rolling Left:  moderate assistance  · Rolling Right: moderate assistance  · Scooting: maximal assistance  · Supine to Sit: maximum/moderate assistace and cues  · Sit to Supine: maximum assistace x 1-2 and cues  · Transfers:     · Sit to Stand:  maximum assistance and cues inside the parallel bars with facilitation tech  · Bed to Chair: to wheelchair  with  maximum assistance and cues  using  Slide Board  · Balance: Standing Static inside the parallel bars with Poor grade. Tolerated for ~10 seconds with 2 attempts with  Maximum assistance and cues.  10/30 Standing with RW Static: Poor for ~15 seconds tolerance with max Assistance and cues  11/2  wheelchair maximum assistance x 2 and cues  with  slide board  using  slide/scoot tech  11/4 Sit to Stand:  Max/moderate assistance and cues with standard walker  Balance: Static Stand with Std Walker: Fair- with 2 mins and 7 seconds  tolerance and  max/moderate assistance and cues  · 11/6 max/moderate assistance and cues  with standard walker  Balance: Standing Static with std walker: Poor+ for 1 minute and 15 seconds(1st attempt); 45 seconds(2nd attempt)    11/9 she is becoming stronger  · Cont with PT  · Rolling Left:  stand by assistance  · Rolling Right: stand by assistance  · Scooting: stand by assistance  · Supine to Sit: contact guard assistance  · Sit to Supine: minimum assistance  · Transfers:     Sit to Stand:  moderate assistance with rolling walker.3    11/13/20 Transfers:     · Sit to Stand:  Moderate assistance and cues with standard walker  Balance: Standing with Std Walker Static: Poor+ for  1 minute and  20 seconds(1st attempt); 35 seconds(2nd attempt). woth Moderate Assistance and cues    · 11/16 Scooting: minimum assistance  · Bridging: minimum assistance  · Supine to Sit: minimum assistance  · Sit to Supine: moderate assistance  · Transfers:     · Sit to Stand:  minimum assistance and moderate assistance with standard walker  Balance: Modified independent with static sitting at edge of bed, min assist with static standing with SW     11/18 - Sit to Stand:  moderate assiotance and cues with standard walker  · Balance: Standing with std walker Static: Fair for ~50 seconds tolerance(1st attempt) ; ~10 seconds tolerance(2nd attempt)  11/19 pt was able stand up and transfer  to Bedside commode for the first time today      · 11/23 Sit to Stand:  minimum assistance with rolling walker.  Balance: sitting good, standing Poor    11/30 she was able to get from laying to sitting to standing and transfer to bedside commode today with stand by assistance!!!!  12/1DrDana Monteiro's TOYA just call me tell me that the CT scan of the thoracic spine shows several new fractures and she now has an unstable thoracic spine issue that will need to be corrected.  They recommend patient be transferred back to [facility].  Discussed the situation with the patient  and she agrees to go

## 2020-12-01 NOTE — PLAN OF CARE
12/01/20 1310   Final Note   Assessment Type Final Discharge Note   Anticipated Discharge Disposition Short Term       Patient being transferred for a higher level of care.     Ana Hawk LMSW

## 2020-12-01 NOTE — PLAN OF CARE
Problem: Adult Inpatient Plan of Care  Goal: Plan of Care Review  Outcome: Ongoing, Progressing     Problem: Fluid Imbalance (Pneumonia)  Goal: Fluid Balance  Outcome: Ongoing, Progressing     Problem: Infection (Pneumonia)  Goal: Resolution of Infection Signs/Symptoms  Outcome: Ongoing, Progressing     Problem: Respiratory Compromise (Pneumonia)  Goal: Effective Oxygenation and Ventilation  Outcome: Ongoing, Progressing     Problem: Wound  Goal: Optimal Wound Healing  Outcome: Ongoing, Progressing     Problem: Fall Injury Risk  Goal: Absence of Fall and Fall-Related Injury  Outcome: Ongoing, Progressing     Problem: Skin Injury Risk Increased  Goal: Skin Health and Integrity  Outcome: Ongoing, Progressing

## 2020-12-01 NOTE — PROGRESS NOTES
Infectious Disease Progress Note    Author: Krerie Flores M.D. DOS & Time created: 4/9/2017  3:13 PM    Chief Complaint   Patient presents with   • Low Back Pain   FU for osteomyelitis, GAS bacteremia and CDI, suspected endograft infection    Interval History:  3/17 AF, WBC 12.9, pt c/o lower back pain, asking for morphine, poor insight into illness, denies any diarrhea  3/18 AF, WBC 13.3, sleeping and not arousable to voice, cultures here negative to date  3/19 AF, WBC 13.4, frustrated about lower back pain not adequately controlled on current pain regimen, plan for EGD with dilatation today  3/20 AF WBC 9.6 +pain-denies SE abx  3/21 AF WBC 10.5 no new complaints  3/22 AF WBC not done tolerating abx well  3/23 AF eye feels a little better-pain about the same  3/24 AF tolerating abx well-MRI confirm OM  3/25 AF WBC 9.5 s/p toe amp this am  3/26 AF WBC 10.1 deneies any new sxs-  3/27 AF, WBC 9.1, sleeping but arousable to voice, back pain stable  3/28 Tmax 99.9, WBC 7.7, not happy about soft foods, and wants to go back on regular diet, having normal BMs, back pain controlled on pain regimen  3/29 AF, WBC 5.7, transferred to ortho, happy he took a shower today, plan of care discussed with pain, states he was told he has arthritis of his back, back pain stable, no diarrhea  3/30 AF, WBC 8.5, had a loose BM this am follow by normal BM, no abd pain  3/31 AF, WBC 9, nausea and vomiting this am, had soft BM this am  4/1 WBC 13.7, still having emesis, states he continues to be thirsty despite drinking lots of water yesterday, diarrhea improving and stools forming  4/2 AF, WBC 11.6, had nose bleed overnight, being transfused blood for Hg 6.6, tolerating pills  4/3- denies any diarrhea. No fevers.   4/4- no fevers. No diarrhea. Back pain under control.   4/6- no diarrhea. No fevers. Still throws up everything  4/7- no fevers. No new issues. The pain issues being addressed.  4/8- no fevers. No new issues  4/9- continues to  [facility] from [facility] wants her transfer for surgery today.    regurgitate. No fevers.   Labs Reviewed, Medications Reviewed, Radiology Reviewed and Wound Reviewed.    Review of Systems:  Review of Systems   Constitutional: Negative for fever and chills.   HENT: Negative for nosebleeds.    Respiratory: Negative for cough and shortness of breath.    Gastrointestinal: Positive for vomiting. Negative for nausea, abdominal pain and diarrhea.   Genitourinary: Negative for dysuria.   Musculoskeletal: Positive for back pain.        Stable   Neurological: Negative for headaches.   All other systems reviewed and are negative.      Hemodynamics:  Temp (24hrs), Av.3 °C (97.4 °F), Min:36.2 °C (97.1 °F), Max:36.7 °C (98.1 °F)  Temperature: 36.2 °C (97.2 °F)  Pulse  Av.1  Min: 56  Max: 114   Blood Pressure : (!) 94/57 mmHg       Physical Exam:  Physical Exam   Constitutional: He is oriented to person, place, and time. He appears well-developed. No distress.   HENT:   Head: Normocephalic and atraumatic.   Eyes: EOM are normal. Pupils are equal, round, and reactive to light.   Neck: Neck supple.   Cardiovascular: Normal rate.    Murmur heard.  IRR   Pulmonary/Chest: Effort normal and breath sounds normal.   Abdominal: Soft. He exhibits no distension. There is no tenderness.   Musculoskeletal: Normal range of motion. He exhibits edema.   R great toe with slight edema but no drainage or erythema, medial callus. Erythema resolving    Left 3rd toe-suture in place. No drainage    RUE PICC nontender   Neurological: He is alert and oriented to person, place, and time.   Skin:   Chronic changes   Nursing note and vitals reviewed.      Labs:  Recent Labs      17   0038  17   0426   WBC  7.7  7.9   RBC  2.26*  2.62*   HEMOGLOBIN  7.1*  8.3*   HEMATOCRIT  22.0*  25.6*   MCV  97.3  97.7   MCH  31.4  31.7   RDW  55.8*  55.1*   PLATELETCT  188  197   MPV  8.6*  8.7*   NEUTSPOLYS  68.90  75.40*   LYMPHOCYTES  18.60*  16.40*   MONOCYTES  10.40  6.60   EOSINOPHILS  1.40  0.80   BASOPHILS   0.30  0.40     Recent Labs      04/07/17   0005  04/09/17   0400   SODIUM  133*  135   POTASSIUM  4.4  3.9   CHLORIDE  100  102   CO2  24  24   GLUCOSE  114*  115*   BUN  6*  8     Recent Labs      04/07/17   0005  04/09/17   0400   ALBUMIN  2.4*   --    TBILIRUBIN  0.5   --    ALKPHOSPHAT  105*   --    TOTPROTEIN  7.4   --    ALTSGPT  56*   --    ASTSGOT  82*   --    CREATININE  1.04  1.04       BLOOD CULTURE   Date Value Ref Range Status   03/15/2017 No growth after 5 days of incubation.  Final    ':  Results     ** No results found for the last 168 hours. **          Imaging  3/17 MRI lumbar spine: There are changes secondary to the abdominal aortic aneurysm endograft. The aneurysm sac measures an approximately 7.6 x 6.7 cm in size. Abnormal contrast enhancement is noted within the wall of the aneurysmal sac. There is also abnormal soft tissue   contrast enhancement in the surrounding soft tissue. These findings are highly suspicious for infected thrombosed aneurysm sac. The possibility of endograft infection is more likely. There is irregular outpouching of the aneurysmal sac along the   posterior portion indenting the left psoas muscle.  2.  Free fluid in the pelvis  3.  There is mild-to-moderate left hydronephrosis likely representing pelviureteric junction obstruction.    3/16 R foot xray - +OM  3/16 L foot xray +OM    Assessment:  Active Hospital Problems    Diagnosis   • *Osteomyelitis of toe (CMS-HCC) [M86.9]   • Bacteremia [R78.81]   • Back pain [M54.9]   • CHF (congestive heart failure) (CMS-HCC) [I50.9]   • Tobacco dependence [F17.200]   • Atrial fibrillation (CMS-HCC) [I48.91]    Endovascular infection       Plan:  Right great toe and left 3rd osteomyelitis  +OM on radiographs-confirmed on repeat MRIs  3/9 OSH wound cx - GAS, MSSA (I confirmed with Peggs microlab)  3/25 s/p 3rd toe amp. No purulence seen proximally to amp site  OR cx (left 3rd toe) - CoNS (S-unasyn) Vanco LINUS 2  Continue  Unasyn  Anticipate 6 weeks of IV abx. 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  Appreciate vascular evaluation - surgery associated with high mortality  Abx per above followed by chronic abx suppression   Tagged WBC scan - no e/o endovascular infection  Will be getting a MRI of the graft area      Clostridium difficile diarrhea  Continue oral vancomycin QID for 2 weeks stop date 03/29/17 then BID while on IV abx with stop date of 04/30/17    Esophageal dysmotility-concern for achalasia  H/o strictures with recent dilatation as [place]'s  Esophagram +distal esophageal obstruction  s/p EGD with dilatation  Needs GI re-eval for possible repeat EGD given hx of strictures      Leukocytosis. Resolved  Multifactorial  No new signs or symptoms of infection  Monitor    Back pain  Chronic per patient      Prognosis guarded    Dispo:  Awaiting SNF placement for long-term abx    DW IM/ vascular

## 2020-12-01 NOTE — PLAN OF CARE
12/01/20 1310   Post-Acute Status   Post-Acute Authorization Other   Other Status See Comments   Discharge Delays None known at this time

## 2020-12-01 NOTE — PLAN OF CARE
NOMNO completed.    12/01/20 1545   Medicare Message   Important Message from Medicare regarding Discharge Appeal Rights Given to patient/caregiver;Explained to patient/caregiver;Signed/date by patient/caregiver   Date IMM was signed 12/01/20   Time IMM was signed 1228

## 2020-12-01 NOTE — CARE UPDATE
After further review of CT with  Surgery team at [facility]. It has been decided patient will transfer back here for surgery intervention.   CM at [facility] to assist with discharge needs.

## 2020-12-01 NOTE — PT/OT/SLP PROGRESS
Physical Therapy      Patient Name:  Martina Betancourt   MRN:  [id number]    Patient not seen today secondary to Other (Comment)(Noted from MRI result had multiple thoracic fractures and is scheduled to be [place] [facility] [place].)    Edgar Lamas, PT

## 2020-12-01 NOTE — DISCHARGE SUMMARY
[facility] Medicine  Discharge Summary      Patient Name: Martina Betancourt  MRN: [id number]  Admission Date: 10/20/2020  Hospital Length of Stay: 42 days  Discharge Date and Time:  12/01/2020 11:27 AM  Attending Physician: Vamsi Manuel MD   Discharging Provider: Jewels Bird NP  Primary Care Provider: Joe Fuller Iii, MD      HPI:   71yo female patient with hx of alzheiners, CAD, HLD, HTN, myopathy, MI, obesity, sleep apnea and OA (knees non ambulatory uses gonzalez round). She was living at home with her daughter. Recently admitted to [facility] with MRSA bacteremia and subsequent pneumonia treated with Zyvox x 7 days with clearance of her bacteremia now admitted with back pain found to have T9-10 osteo discitis, T8-10 epidural phlegmon with associated paraverterbral abscesses. Spine infection likely hematogenous from under treated bacteremia. Neurosurgery has been consulted. She has been on Vancomycin and Ceftriaxone. Underwent T9-10 disc space biopsy with IR on 10/16. Cultures negative, though had been on IV antibiotics multiple days prior. Afebrile. HDS. Repeat blood cx sterile. ID rec cont vanc 1750mg q 24 till 12/3.     * No surgery found *      Hospital Course:   10/23 Here for skilled ; needing IV abx for spinal abscess;  vanc 1750mg q 24 till 12/3  Afebrile , 3LNC - O2 sat 95%   + debility, very deconditioned; bilt LE x 10 reps followed by bed mobility trng and static sit balance and tolerance at side of the bed  C/o back pain with activity; Occupational therapist notes that she is very resistant to movement and c/o severe pain with minimal activity.   Will order toradol 15mg x 1dose IV; pt did much better after toradol rx today per OT. Will order daily prior to OT as tolerated  Monitor renal fx     10/26 here for skilled therapy and cont IV abc. Vanc 1750mg iv every 12hr. Noted elevated WBC this am and she report that she has had diarrhea for the last 4 days.   · PT  reports Supine to Sit: maximal assistance, of 2 persons and patient able to reach with UE to push through rail and with improved push off with UE and initiation  · Sit to Supine: maximal assistance of 2 people with improved ability of patient to control descent of upper body  · Transfers:     · Sit to Stand:  moderate assistance, maximal assistance of 2 persons with no AD.  PT and PT tech blocking both knees while assisting pt to elevate hips from bed  Balance: pt able to stand x15 seconds EOB limited largely by knee and back pain.       10/28  She is still on vanc IV daily. No longer with diarrhea. Did have heme positive stools H/H stable on lovenox for DVT prophylaxis and plavix. Will monitor h/h M/Thurs. No fever. No elevated WBC  pt is really immobile.  · Bed Mobility:     · Rolling Left:  moderate assistance  · Rolling Right: moderate assistance  · Scooting: maximal assistance  · Supine to Sit: maximum/moderate assistace and cues  · Sit to Supine: maximum assistace x 1-2 and cues  · Transfers:     · Sit to Stand:  maximum assistance and cues inside the parallel bars with facilitation tech  · Bed to Chair: to wheelchair  with  maximum assistance and cues  using  Slide Board  · Balance: Standing Static inside the parallel bars with Poor grade. Tolerated for ~10 seconds with 2 attempts with  Maximum assistance and cues.  Cont PT daily  10/30  IV  vanc 1,000mg q 24hr x 8 weeks total till 12/3 per ID for discitis; random vanc 12.5 yesterday    No longer with diarrhea. Did have heme positive stools H/H stable on lovenox for DVT prophylaxis and plavix. H&H stable, lovenox stopped. No fever. No elevated WBC. She has productive cough this am. K+ 3.2 yesterday, getting lasix, will repeat KCL 40meq today   pt is really immobile.Standing with RW Static: Poor for ~15 seconds tolerance with max Assistance and cues  Has PICC line- one port clotted;       11/2 she remains on vanc 1000mg  q 24hr x 8 weeks total till 12/3 per ID  for discitis. Vanc trough 15.8 10/31/20. Vss/afebrile. intermittent back pain with prn pain meds helping. She is not doing much with PT. She uses gonzalez round at home but can transfer independently. Here she is max assist     11/4 Remains on vanc 1000mg  q 24hr x 8 weeks total till 12/3 per ID for discitis. Vanc trough 16.6 on 11/2   Afebrile , having regular BMs; getting Norco 10mg q 6   · Continues to require max assist; Sit to Stand:  Max/moderate assistance and cues with standard walker  Balance: Static Stand with Std Walker: Fair- with 2 mins and 7 seconds  tolerance and max/moderate assistance and cues  11/4 Remains on vanc 1000mg  q 24hr x 8 weeks total till 12/3 per ID for discitis. Vanc trough 16.6 on 11/2   Afebrile, WBC nml, creat stable , K+3.4  Requires maximal assist but finally standing with PT with walker .    11/9/20    Remains on vanc 1000mg  q 24hr x 8 weeks total till 12/3 per ID for discitis. VSS/afebrile. No elevated WBC. Last vanc trough 16.3 11/7  She is progressing with PT now min assist from sit to supine and mod assist sit to stand with RW,    11/11 remains on vanc 750mg IV every 24hr, dose monitored by pharm. Last vanc trough 16.5 11/9. Repeat MRI done Monday. Contact with neurosurgery sent to discuss comparison with original johny since she is mid way on IV abx and had no intervention. She has no comoplaints. VSS/labs reviewed. She is standing at bedside 1min and 15 sec with mod assist    11/13 remains on vanc 750mg IV every 24hr, dose monitored by pharm. Last vanc trough 14.8  11/11. Repeat MRI done Monday; mid tx as per ID recommendations;  messaged ID, shows severe Degenerative disease of spine   Able to stand now for over 1m with PT     11/16 remains on vanc every 24hr 750mg. Last vanc trough was 15.9 yesterday. Afebrile, no elevated WBC. She reports that she is feeling well. Getting stronger. Working with PT. Min assist with bed mobility and mod with standing.     11/18/20 planned  "for OP visit with ID this am   Remains on vanc every 24hr 750mg. Last vanc trough was 16.8  yesterday. Afebrile, no elevated WBC    11/20/20 Remains on vanc 1000mg  q 24hr x 8 weeks total till 12/3 per ID for discitis. VSS/afebrile. No elevated WBC. Last vanc trough 19.2 yesterday ;  Went to [facility] with ID 11/18; she reports they told her she still has "three pockets of infection"; however, the note is incomplete and does not provide further recommendations;message sent in Epic this am.   11/20 pt was able stand up and transfer  to Bedside commode for the first time today, also had a BM   ESR/CRP pending otherwise labs stable    11/23/20 pt remains on Vanc 750mg IV every 24hr till 12/3. ID saw patient last week and recommends continued IV abx through the coarse. Neuro surgery has been set up for virtual visit this am. crp improved 139>16 sed rate 44. Labs and VSS. She is min to contact assist with getting up. She is not ambulating though. Case management working with patient and daughter about her d/c plan.     11/25/20 Remains on vanc 1000mg  q 24hr x 8 weeks total till 12/3 per ID for discitisID saw patient last week and recommends continued IV abx through the coarse. Vanc trough 16.3 yesteray; Had virtual visit with neurology yesterday   Standing with Std Walker Static: Fair- and tolerated for 1 min and 15 seconds(1st attempt) ; 45 seconds (2nd attempt)  Pt reports on virtual visit that she will need repeat MRI next week. Still waiting these notes and recs     11/27 vanc 1000mg  q 24hr x 8 weeks total till 12/3 per ID for discitisID saw patient last week and recommends continued IV abx through the coarse. Vanc trough 16.2 yesteray; Renal fx stable    Had virtual visit with neurology; plan CT thoracic spine for further evaluation of bone quality; did not document timing of CT?   - Follow-up with Dr. Monteiro within the next 2 weeks for further discussion, may be virtual visit  - Continue Vancomycin through 12/3 per " ID recs    11/30 she is doing very well. This am she has OT at bedside. She was able to get from bed to bedside commode with stand by assist!!! She remains on vanc 1000mg IV every 24hr. Last vanc trough was 10.1 yesterday. No fever. VSS.     12/1-  Dr. Monteiro's TOYA just call me tell me that the CT scan of the thoracic spine shows several new fractures and she now has an unstable thoracic spine issue that will need to be corrected.  They recommend patient be transferred back to [facility].  Discussed the situation with the patient and she agrees to go     Consults:   Consults (From admission, onward)        Status Ordering Provider     Inpatient consult to Orthopedics  Once     Provider:  Mango Mancuso MD    Acknowledged PAZ DAVALOS     Inpatient consult to Registered Dietitian/Nutritionist  Once     Provider:  (Not yet assigned)    Completed RUSSEL ALTAMIRANO     IP consult to case management  Once     Provider:  (Not yet assigned)    Completed SOFIYA JACOBSON     Pharmacy to dose Vancomycin consult  Once     Provider:  (Not yet assigned)    Acknowledged PAZ DAVALOS          * Debility  Was walking with walker prior to pneumonia/bacteremia admit last month then after d.c was only w/c bound (gonzalez round) will add pt here and try and get her as strong as poss as she lives at home with family.   10/23 Chronic co back ache but also has recent high ankle fx ; per EDDIE Vinson NP Spoke with Ariela VASQUES who will see patient while on SWING. She looked over x rays and hx and reports that patient can weight bear as tolerated. Nurse/OT/MD notified    · 10/26 Supine to Sit: maximal assistance, of 2 persons and patient able to reach with UE to push through rail and with improved push off with UE and initiation  · Sit to Supine: maximal assistance of 2 people with improved ability of patient to control descent of upper body  · Transfers:     · Sit to Stand:  moderate assistance, maximal  assistance of 2 persons with no AD.  PT and PT tech blocking both knees while assisting pt to elevate hips from bed  Balance: pt able to stand x15 seconds EOB limited largely by knee and back pain.     10/28.  · Bed Mobility:     · Rolling Left:  moderate assistance  · Rolling Right: moderate assistance  · Scooting: maximal assistance  · Supine to Sit: maximum/moderate assistace and cues  · Sit to Supine: maximum assistace x 1-2 and cues  · Transfers:     · Sit to Stand:  maximum assistance and cues inside the parallel bars with facilitation tech  · Bed to Chair: to wheelchair  with  maximum assistance and cues  using  Slide Board  · Balance: Standing Static inside the parallel bars with Poor grade. Tolerated for ~10 seconds with 2 attempts with  Maximum assistance and cues.  10/30 Standing with RW Static: Poor for ~15 seconds tolerance with max Assistance and cues  11/2  wheelchair maximum assistance x 2 and cues  with  slide board  using  slide/scoot tech  11/4 Sit to Stand:  Max/moderate assistance and cues with standard walker  Balance: Static Stand with Std Walker: Fair- with 2 mins and 7 seconds  tolerance and max/moderate assistance and cues  · 11/6 max/moderate assistance and cues  with standard walker  Balance: Standing Static with std walker: Poor+ for 1 minute and 15 seconds(1st attempt); 45 seconds(2nd attempt)    11/9 she is becoming stronger  · Cont with PT  · Rolling Left:  stand by assistance  · Rolling Right: stand by assistance  · Scooting: stand by assistance  · Supine to Sit: contact guard assistance  · Sit to Supine: minimum assistance  · Transfers:     Sit to Stand:  moderate assistance with rolling walker.3    11/13/20 Transfers:     · Sit to Stand:  Moderate assistance and cues with standard walker  Balance: Standing with Std Walker Static: Poor+ for  1 minute and  20 seconds(1st attempt); 35 seconds(2nd attempt). woth Moderate Assistance and cues    · 11/16 Scooting: minimum  assistance  · Bridging: minimum assistance  · Supine to Sit: minimum assistance  · Sit to Supine: moderate assistance  · Transfers:     · Sit to Stand:  minimum assistance and moderate assistance with standard walker  Balance: Modified independent with static sitting at edge of bed, min assist with static standing with SW     11/18 - Sit to Stand:  moderate assiotance and cues with standard walker  · Balance: Standing with std walker Static: Fair for ~50 seconds tolerance(1st attempt) ; ~10 seconds tolerance(2nd attempt)  11/19 pt was able stand up and transfer  to Bedside commode for the first time today      · 11/23 Sit to Stand:  minimum assistance with rolling walker.  Balance: sitting good, standing Poor    11/30 she was able to get from laying to sitting to standing and transfer to bedside commode today with stand by assistance!!!!  12/1Dr. hTania's TOYA just call me tell me that the CT scan of the thoracic spine shows several new fractures and she now has an unstable thoracic spine issue that will need to be corrected.  They recommend patient be transferred back to [facility].  Discussed the situation with the patient and she agrees to go      Diarrhea  Stools d/c as diarrhea has improved. + heme occult . lovenox stopped cbc stable     11/20: patient still with significant immobility. H/H stable and no gross bleeding. Will resume lovenox      Malnutrition of mild degree  Dietary..  Boost.      Hydronephrosis  Seen on CT 10/7 - u/a was negative .  Urinating without issue  No flank pain  No signs of infection  No hematuria.      CAD (coronary artery disease)  Cont asa, plavix, lasix, isosorbide, lisinopril, toprol and statin      HTN (hypertension)  Increase lisinopril 2.5>10mg daily  10/23 SBP 160s at times; lisinopril just increased will give more time for lisinopril to work before titration  10/26 SBP 140s; cont to monitor  10/28 /80- increase lisinopril  10/30 BP much better 119/59- 138/62   .  11/2 /58.  VSS   11/9 /56- well controlled (cont lisinopril, lasix, isosorbide, metoprolol).  11/23 112/53 well controlled  11/30 /57     Discitis  vanc 1450mg IV every 24hr x 8 weeks total till 12/3 per ID ; will stay here for the duration   PT  vanc now 1000mg IV every 24hr till 12/3 per ID last vanc trough 10/31 15.8    Reach out to ns today for guidance on re-imaging  Noted per ID Plan for 6 - 8 weeks of IV antibiotics with repeat MRI at midpoint of treatment as no surgical drainage undertaken.  - ID will follow.  She is currently midpoint; will plan for MRI of thoracic on Monday .  Cont vanc now 750mg IV daily with trough 11/7 16.3 .  11/13 remains max assist; can stand for 1m 20sec; awaiting ID assessment of MRI    11/11 contacted neurosurgery for repeat MRI review. She is only able to stand at bedside with therapy for 1min and 15 sec with mod to max assist. She would be a difficult transfer at this point.   11/16 will repeat message to neurosurgery as well as ID to eval patient maybe virtual visit. She would be a very difficult transfer for in person visit  11/18 no response from ID ; pt set up for [facility]YANNA for OP appnt with ID today   11/20 Still waiting on ID recs, multiple messages sent to neurosurgery; Georgina Morrison and Luis E Calabrese as well as ID; Neftali Rm. We are trying to follow discharge plan from acute stay with repeat MRI and ID follow up and awaiting recommendations. Cont current treatment pending recs    11/23 Dr juan discussed case with Dr Rm and recs to cont plan with vanc till 12/3.    11/30 Dr Rm ID cont vanc till 12/3 to complete 6 weeks IV abx            Jin VASQUES recommends cont abx as well as repeat CT- thoracic -- scheduled today   Also scheduled virtual f/u with Dr Calabrese 12/7- asked nurse to set up today    12/1-Transfer patient to Dr. Colon's service due to worsening unstable thoracic fractures        Severe obesity (BMI >= 40)  Using  boost as she has low appetite and low protein .      Obstructive sleep apnea treated with continuous positive airway pressure (CPAP)  continue home cpap.      Dementia without behavioral disturbance  Cont namenda and aricept       Final Active Diagnoses:    Diagnosis Date Noted POA    PRINCIPAL PROBLEM:  Debility [R53.81] 10/21/2020 Yes    Diarrhea [R19.7] 10/26/2020 Yes    Malnutrition of mild degree [E44.1] 10/21/2020 Yes    Hydronephrosis [N13.30] 10/09/2020 Yes    Discitis [M46.40] 10/08/2020 Yes    CAD (coronary artery disease) [I25.10] 10/08/2020 Yes    HTN (hypertension) [I10] 10/08/2020 Yes    Severe obesity (BMI >= 40) [E66.01] 08/10/2020 Yes    Obstructive sleep apnea treated with continuous positive airway pressure (CPAP) [G47.33, Z99.89] 08/10/2020 Not Applicable    Dementia without behavioral disturbance [F03.90] 07/26/2020 Yes      Problems Resolved During this Admission:    Diagnosis Date Noted Date Resolved POA    Cough productive of purulent sputum [R05] 10/30/2020 11/27/2020 No       Discharged Condition: stable    Disposition: Short Term Hospital    Follow Up:  Follow-up Information     Luis E Monteiro MD.    Specialty: Neurosurgery  Why: virtual appt 12/14 at 3pm   Contact information:  [address]  [facility] [address] [phone]                 Patient Instructions:   No discharge procedures on file.    Significant Diagnostic Studies:   Significant Labs: procal 0.04  BMP:        Recent Labs   Lab 11/30/20  0528 12/01/20  0547   GLU  --  93   NA  --  140   K  --  3.8   CL  --  101   CO2  --  30*   BUN  --  13   CREATININE  --  1.0   CALCIUM  --  9.9   MG 2.2  --    phos 3.7  Mag 1.9        Lab Results   Component Value Date     WBC 6.92 11/30/2020     HGB 10.7 (L) 11/30/2020     HCT 35.4 (L) 11/30/2020     MCV 91 11/30/2020      11/30/2020 11/20 >>16.2      11/20 sed rate 44   11/29 vanc trough 10.1      MRI thoracic spine   1. Altered signal intensity  changes at T9/T10 with evidence of erosive focus about the opposing endplates and evidence of enhancing phlegmonous formation identified in the central component of the disc with central low signal intensity compatible with osteomyelitis/discitis.  Vertical dimensions of the area of interest cause extreme erosion along the anterior portion of the vertebral bodies of interest without evidence of any significant paraspinal component.  2. No significant soft tissue component.  3. Chronic degenerative spondylosis changes of the remaining segments of the thoracic spine.  4. Chronic degenerative changes of the entirety of the cervical spine.     CT scan thoracic spine shows increased number of fractures resulting in an unstable thoracic spine.         Pending Diagnostic Studies:     None         Medications:  Reconciled Home Medications:      Medication List      START taking these medications    aluminum & magnesium hydroxide-simethicone 400-400-40 mg/5 mL suspension  Commonly known as: MYLANTA MAX STRENGTH  Take 30 mLs by mouth every 6 (six) hours as needed for Indigestion.     calcium carbonate 200 mg calcium (500 mg) chewable tablet  Commonly known as: TUMS  Take 1 tablet (500 mg total) by mouth 2 (two) times daily as needed.     enoxaparin 40 mg/0.4 mL Syrg  Commonly known as: LOVENOX  Inject 0.4 mLs (40 mg total) into the skin once daily.     melatonin 3 mg tablet  Commonly known as: MELATIN  Take 2 tablets (6 mg total) by mouth nightly as needed for Insomnia.     memantine 10 MG Tab  Commonly known as: NAMENDA  Take 1 tablet (10 mg total) by mouth 2 (two) times daily.  Replaces: memantine 28 mg Cspx     ondansetron 4 MG Tbdl  Commonly known as: ZOFRAN-ODT  Take 1 tablet (4 mg total) by mouth every 8 (eight) hours as needed.     sodium chloride 0.9% injection  Commonly known as: NORMAL SALINE FLUSH  Inject 10 mLs into the vein as needed.     vancomycin in dextrose 5 % 1 gram/250 mL Soln  Inject 250 mLs (1,000 mg  total) into the vein once daily.        CHANGE how you take these medications    lisinopriL 10 MG tablet  Take 1 tablet (10 mg total) by mouth once daily.  What changed:   · medication strength  · how much to take        CONTINUE taking these medications    acetaminophen 325 MG tablet  Commonly known as: TYLENOL  Take 2 tablets (650 mg total) by mouth every 6 (six) hours as needed (HEADACHE, TEMPERATURE - FEVER > 100.4).     albuterol-ipratropium 2.5 mg-0.5 mg/3 mL nebulizer solution  Commonly known as: DUO-NEB  Take 3 mLs by nebulization every 6 (six) hours as needed for Wheezing or Shortness of Breath. Rescue     aspirin 81 MG EC tablet  Commonly known as: ECOTRIN  Take 81 mg by mouth once daily.     bisacodyL 5 mg EC tablet  Commonly known as: DULCOLAX  Take 5 mg by mouth daily as needed for Constipation.     clopidogreL 75 mg tablet  Commonly known as: PLAVIX  Take 1 tablet (75 mg total) by mouth once daily.     donepeziL 10 MG tablet  Commonly known as: ARICEPT  Take 10 mg by mouth every evening.     DULoxetine 60 MG capsule  Commonly known as: CYMBALTA  Take 60 mg by mouth once daily.     ferrous sulfate 324 mg (65 mg iron) Tbec  Take 325 mg by mouth every evening.     furosemide 40 MG tablet  Commonly known as: LASIX  Take 1 tablet (40 mg total) by mouth once daily.     guaiFENesin 600 mg 12 hr tablet  Commonly known as: MUCINEX  Take 1,200 mg by mouth 2 (two) times daily.     HYDROcodone-acetaminophen  mg per tablet  Commonly known as: NORCO  Take 1 tablet by mouth every 6 (six) hours as needed for Pain.     isosorbide mononitrate 60 MG 24 hr tablet  Commonly known as: IMDUR  Take 60 mg by mouth once daily.     methocarbamoL 500 MG Tab  Commonly known as: ROBAXIN  Take 500 mg by mouth 4 (four) times daily.     metoprolol succinate 50 MG 24 hr tablet  Commonly known as: TOPROL-XL  Take 50 mg by mouth once daily.     miconazole nitrate 2% 2 % Oint  Commonly known as: MICOTIN  Apply topically 2 (two)  times daily.     pantoprazole 40 MG tablet  Commonly known as: PROTONIX  Take 1 tablet (40 mg total) by mouth before breakfast.     pravastatin 40 MG tablet  Commonly known as: PRAVACHOL  Take 40 mg by mouth every evening.        STOP taking these medications    memantine 28 mg Cspx  Commonly known as: NAMENDA XR  Replaced by: memantine 10 MG Tab            Indwelling Lines/Drains at time of discharge:   Lines/Drains/Airways     Peripherally Inserted Central Catheter Line            PICC Double Lumen 10/19/20 1114 right brachial 43 days          Drain            Female External Urinary Catheter 11/13/20 1000 18 days                Time spent on the discharge of patient: 30 minutes  Patient was seen and examined on the date of discharge and determined to be suitable for discharge.         Jewels Bird NP  Department of Hospital Medicine  [facility]

## 2020-12-02 ENCOUNTER — TELEPHONE (OUTPATIENT)
Dept: INFECTIOUS DISEASES | Facility: CLINIC | Age: 70
End: 2020-12-02

## 2020-12-02 PROBLEM — M46.24 OSTEOMYELITIS OF THORACIC VERTEBRA: Status: ACTIVE | Noted: 2020-12-02

## 2020-12-02 PROBLEM — Z01.810 PRE-OPERATIVE CARDIOVASCULAR EXAMINATION: Status: ACTIVE | Noted: 2020-12-02

## 2020-12-02 LAB
ABO + RH BLD: NORMAL
ALBUMIN SERPL BCP-MCNC: 2.8 G/DL (ref 3.5–5.2)
ALP SERPL-CCNC: 117 U/L (ref 55–135)
ALT SERPL W/O P-5'-P-CCNC: 10 U/L (ref 10–44)
ANION GAP SERPL CALC-SCNC: 10 MMOL/L (ref 8–16)
ANION GAP SERPL CALC-SCNC: 9 MMOL/L (ref 8–16)
APTT BLDCRRT: 25.1 SEC (ref 21–32)
AST SERPL-CCNC: 12 U/L (ref 10–40)
BASOPHILS # BLD AUTO: 0.03 K/UL (ref 0–0.2)
BASOPHILS # BLD AUTO: 0.04 K/UL (ref 0–0.2)
BASOPHILS NFR BLD: 0.4 % (ref 0–1.9)
BASOPHILS NFR BLD: 0.5 % (ref 0–1.9)
BILIRUB SERPL-MCNC: 0.3 MG/DL (ref 0.1–1)
BLD GP AB SCN CELLS X3 SERPL QL: NORMAL
BUN SERPL-MCNC: 15 MG/DL (ref 8–23)
BUN SERPL-MCNC: 15 MG/DL (ref 8–23)
CALCIUM SERPL-MCNC: 10.1 MG/DL (ref 8.7–10.5)
CALCIUM SERPL-MCNC: 10.3 MG/DL (ref 8.7–10.5)
CHLORIDE SERPL-SCNC: 100 MMOL/L (ref 95–110)
CHLORIDE SERPL-SCNC: 98 MMOL/L (ref 95–110)
CO2 SERPL-SCNC: 28 MMOL/L (ref 23–29)
CO2 SERPL-SCNC: 30 MMOL/L (ref 23–29)
CREAT SERPL-MCNC: 1 MG/DL (ref 0.5–1.4)
CREAT SERPL-MCNC: 1.1 MG/DL (ref 0.5–1.4)
CRP SERPL-MCNC: 12.4 MG/L (ref 0–8.2)
DIFFERENTIAL METHOD: ABNORMAL
DIFFERENTIAL METHOD: ABNORMAL
EOSINOPHIL # BLD AUTO: 0.2 K/UL (ref 0–0.5)
EOSINOPHIL # BLD AUTO: 0.2 K/UL (ref 0–0.5)
EOSINOPHIL NFR BLD: 2.8 % (ref 0–8)
EOSINOPHIL NFR BLD: 3.1 % (ref 0–8)
ERYTHROCYTE [DISTWIDTH] IN BLOOD BY AUTOMATED COUNT: 16.7 % (ref 11.5–14.5)
ERYTHROCYTE [DISTWIDTH] IN BLOOD BY AUTOMATED COUNT: 16.9 % (ref 11.5–14.5)
ERYTHROCYTE [SEDIMENTATION RATE] IN BLOOD BY WESTERGREN METHOD: 97 MM/HR (ref 0–36)
EST. GFR  (AFRICAN AMERICAN): 58 ML/MIN/1.73 M^2
EST. GFR  (AFRICAN AMERICAN): >60 ML/MIN/1.73 M^2
EST. GFR  (NON AFRICAN AMERICAN): 50.3 ML/MIN/1.73 M^2
EST. GFR  (NON AFRICAN AMERICAN): 56.4 ML/MIN/1.73 M^2
GLUCOSE SERPL-MCNC: 71 MG/DL (ref 70–110)
GLUCOSE SERPL-MCNC: 88 MG/DL (ref 70–110)
HCT VFR BLD AUTO: 36.8 % (ref 37–48.5)
HCT VFR BLD AUTO: 37.6 % (ref 37–48.5)
HGB BLD-MCNC: 10.7 G/DL (ref 12–16)
HGB BLD-MCNC: 11 G/DL (ref 12–16)
IMM GRANULOCYTES # BLD AUTO: 0.02 K/UL (ref 0–0.04)
IMM GRANULOCYTES # BLD AUTO: 0.02 K/UL (ref 0–0.04)
IMM GRANULOCYTES NFR BLD AUTO: 0.3 % (ref 0–0.5)
IMM GRANULOCYTES NFR BLD AUTO: 0.3 % (ref 0–0.5)
INR PPP: 0.9 (ref 0.8–1.2)
LYMPHOCYTES # BLD AUTO: 2 K/UL (ref 1–4.8)
LYMPHOCYTES # BLD AUTO: 2.5 K/UL (ref 1–4.8)
LYMPHOCYTES NFR BLD: 27.5 % (ref 18–48)
LYMPHOCYTES NFR BLD: 31 % (ref 18–48)
MCH RBC QN AUTO: 27.5 PG (ref 27–31)
MCH RBC QN AUTO: 27.5 PG (ref 27–31)
MCHC RBC AUTO-ENTMCNC: 29.1 G/DL (ref 32–36)
MCHC RBC AUTO-ENTMCNC: 29.3 G/DL (ref 32–36)
MCV RBC AUTO: 94 FL (ref 82–98)
MCV RBC AUTO: 95 FL (ref 82–98)
MONOCYTES # BLD AUTO: 0.9 K/UL (ref 0.3–1)
MONOCYTES # BLD AUTO: 1 K/UL (ref 0.3–1)
MONOCYTES NFR BLD: 11.2 % (ref 4–15)
MONOCYTES NFR BLD: 13.2 % (ref 4–15)
NEUTROPHILS # BLD AUTO: 4 K/UL (ref 1.8–7.7)
NEUTROPHILS # BLD AUTO: 4.3 K/UL (ref 1.8–7.7)
NEUTROPHILS NFR BLD: 54.2 % (ref 38–73)
NEUTROPHILS NFR BLD: 55.5 % (ref 38–73)
NRBC BLD-RTO: 0 /100 WBC
NRBC BLD-RTO: 0 /100 WBC
PLATELET # BLD AUTO: 307 K/UL (ref 150–350)
PLATELET # BLD AUTO: 368 K/UL (ref 150–350)
PMV BLD AUTO: 9.4 FL (ref 9.2–12.9)
PMV BLD AUTO: 9.5 FL (ref 9.2–12.9)
POTASSIUM SERPL-SCNC: 3.6 MMOL/L (ref 3.5–5.1)
POTASSIUM SERPL-SCNC: 3.9 MMOL/L (ref 3.5–5.1)
PROCALCITONIN SERPL IA-MCNC: 0.03 NG/ML
PROT SERPL-MCNC: 6.8 G/DL (ref 6–8.4)
PROTHROMBIN TIME: 10.4 SEC (ref 9–12.5)
RBC # BLD AUTO: 3.89 M/UL (ref 4–5.4)
RBC # BLD AUTO: 4 M/UL (ref 4–5.4)
SODIUM SERPL-SCNC: 137 MMOL/L (ref 136–145)
SODIUM SERPL-SCNC: 138 MMOL/L (ref 136–145)
VANCOMYCIN TROUGH SERPL-MCNC: 12.9 UG/ML (ref 10–22)
WBC # BLD AUTO: 7.2 K/UL (ref 3.9–12.7)
WBC # BLD AUTO: 7.94 K/UL (ref 3.9–12.7)

## 2020-12-02 PROCEDURE — 85610 PROTHROMBIN TIME: CPT

## 2020-12-02 PROCEDURE — 80048 BASIC METABOLIC PNL TOTAL CA: CPT

## 2020-12-02 PROCEDURE — 99223 1ST HOSP IP/OBS HIGH 75: CPT | Mod: ,,, | Performed by: PHYSICIAN ASSISTANT

## 2020-12-02 PROCEDURE — 25000003 PHARM REV CODE 250: Performed by: NEUROLOGICAL SURGERY

## 2020-12-02 PROCEDURE — 86850 RBC ANTIBODY SCREEN: CPT

## 2020-12-02 PROCEDURE — 85730 THROMBOPLASTIN TIME PARTIAL: CPT

## 2020-12-02 PROCEDURE — 80202 ASSAY OF VANCOMYCIN: CPT

## 2020-12-02 PROCEDURE — 25000003 PHARM REV CODE 250: Performed by: STUDENT IN AN ORGANIZED HEALTH CARE EDUCATION/TRAINING PROGRAM

## 2020-12-02 PROCEDURE — 36415 COLL VENOUS BLD VENIPUNCTURE: CPT

## 2020-12-02 PROCEDURE — 84145 PROCALCITONIN (PCT): CPT

## 2020-12-02 PROCEDURE — 25000003 PHARM REV CODE 250: Performed by: PHYSICIAN ASSISTANT

## 2020-12-02 PROCEDURE — 63600175 PHARM REV CODE 636 W HCPCS: Performed by: PHYSICIAN ASSISTANT

## 2020-12-02 PROCEDURE — 99223 PR INITIAL HOSPITAL CARE,LEVL III: ICD-10-PCS | Mod: ,,, | Performed by: PHYSICIAN ASSISTANT

## 2020-12-02 PROCEDURE — 20600001 HC STEP DOWN PRIVATE ROOM

## 2020-12-02 PROCEDURE — 87040 BLOOD CULTURE FOR BACTERIA: CPT

## 2020-12-02 PROCEDURE — 63600175 PHARM REV CODE 636 W HCPCS: Performed by: NEUROLOGICAL SURGERY

## 2020-12-02 PROCEDURE — 86140 C-REACTIVE PROTEIN: CPT

## 2020-12-02 PROCEDURE — 63600175 PHARM REV CODE 636 W HCPCS: Mod: JG | Performed by: PHYSICIAN ASSISTANT

## 2020-12-02 PROCEDURE — 85652 RBC SED RATE AUTOMATED: CPT

## 2020-12-02 PROCEDURE — A9585 GADOBUTROL INJECTION: HCPCS | Performed by: NEUROLOGICAL SURGERY

## 2020-12-02 PROCEDURE — 99233 PR SUBSEQUENT HOSPITAL CARE,LEVL III: ICD-10-PCS | Mod: ,,, | Performed by: PHYSICIAN ASSISTANT

## 2020-12-02 PROCEDURE — 63600175 PHARM REV CODE 636 W HCPCS: Mod: JG | Performed by: STUDENT IN AN ORGANIZED HEALTH CARE EDUCATION/TRAINING PROGRAM

## 2020-12-02 PROCEDURE — 99233 SBSQ HOSP IP/OBS HIGH 50: CPT | Mod: ,,, | Performed by: PHYSICIAN ASSISTANT

## 2020-12-02 PROCEDURE — 80053 COMPREHEN METABOLIC PANEL: CPT

## 2020-12-02 PROCEDURE — 25500020 PHARM REV CODE 255: Performed by: NEUROLOGICAL SURGERY

## 2020-12-02 PROCEDURE — 85025 COMPLETE CBC W/AUTO DIFF WBC: CPT | Mod: 91

## 2020-12-02 PROCEDURE — 99223 1ST HOSP IP/OBS HIGH 75: CPT | Mod: GC,,, | Performed by: INTERNAL MEDICINE

## 2020-12-02 PROCEDURE — 99223 PR INITIAL HOSPITAL CARE,LEVL III: ICD-10-PCS | Mod: GC,,, | Performed by: INTERNAL MEDICINE

## 2020-12-02 RX ORDER — DONEPEZIL HYDROCHLORIDE 5 MG/1
10 TABLET, FILM COATED ORAL NIGHTLY
Status: DISCONTINUED | OUTPATIENT
Start: 2020-12-02 | End: 2020-12-18 | Stop reason: HOSPADM

## 2020-12-02 RX ORDER — HYDROCODONE BITARTRATE AND ACETAMINOPHEN 10; 325 MG/1; MG/1
1 TABLET ORAL EVERY 6 HOURS PRN
Status: DISCONTINUED | OUTPATIENT
Start: 2020-12-02 | End: 2020-12-02

## 2020-12-02 RX ORDER — ACETAMINOPHEN 500 MG
1000 TABLET ORAL EVERY 8 HOURS
Status: DISCONTINUED | OUTPATIENT
Start: 2020-12-02 | End: 2020-12-09

## 2020-12-02 RX ORDER — METOPROLOL SUCCINATE 25 MG/1
50 TABLET, EXTENDED RELEASE ORAL DAILY
Status: DISCONTINUED | OUTPATIENT
Start: 2020-12-02 | End: 2020-12-09

## 2020-12-02 RX ORDER — AMOXICILLIN 250 MG
1 CAPSULE ORAL 2 TIMES DAILY
Status: DISCONTINUED | OUTPATIENT
Start: 2020-12-02 | End: 2020-12-18 | Stop reason: HOSPADM

## 2020-12-02 RX ORDER — ACETAMINOPHEN 325 MG/1
650 TABLET ORAL EVERY 6 HOURS PRN
Status: DISCONTINUED | OUTPATIENT
Start: 2020-12-02 | End: 2020-12-11

## 2020-12-02 RX ORDER — VANCOMYCIN HCL IN 5 % DEXTROSE 1G/250ML
1000 PLASTIC BAG, INJECTION (ML) INTRAVENOUS
Status: DISCONTINUED | OUTPATIENT
Start: 2020-12-02 | End: 2020-12-02

## 2020-12-02 RX ORDER — IPRATROPIUM BROMIDE AND ALBUTEROL SULFATE 2.5; .5 MG/3ML; MG/3ML
3 SOLUTION RESPIRATORY (INHALATION) EVERY 6 HOURS PRN
Status: DISCONTINUED | OUTPATIENT
Start: 2020-12-02 | End: 2020-12-18 | Stop reason: HOSPADM

## 2020-12-02 RX ORDER — GADOBUTROL 604.72 MG/ML
10 INJECTION INTRAVENOUS
Status: COMPLETED | OUTPATIENT
Start: 2020-12-02 | End: 2020-12-02

## 2020-12-02 RX ORDER — HEPARIN SODIUM 5000 [USP'U]/ML
5000 INJECTION, SOLUTION INTRAVENOUS; SUBCUTANEOUS EVERY 8 HOURS
Status: DISCONTINUED | OUTPATIENT
Start: 2020-12-02 | End: 2020-12-07

## 2020-12-02 RX ORDER — PANTOPRAZOLE SODIUM 40 MG/1
40 TABLET, DELAYED RELEASE ORAL
Status: DISCONTINUED | OUTPATIENT
Start: 2020-12-02 | End: 2020-12-18 | Stop reason: HOSPADM

## 2020-12-02 RX ORDER — PRAVASTATIN SODIUM 40 MG/1
40 TABLET ORAL NIGHTLY
Status: DISCONTINUED | OUTPATIENT
Start: 2020-12-02 | End: 2020-12-18 | Stop reason: HOSPADM

## 2020-12-02 RX ORDER — OXYCODONE HYDROCHLORIDE 5 MG/1
5 TABLET ORAL EVERY 6 HOURS PRN
Status: DISCONTINUED | OUTPATIENT
Start: 2020-12-02 | End: 2020-12-18 | Stop reason: HOSPADM

## 2020-12-02 RX ORDER — ONDANSETRON 4 MG/1
4 TABLET, ORALLY DISINTEGRATING ORAL EVERY 8 HOURS PRN
Status: DISCONTINUED | OUTPATIENT
Start: 2020-12-02 | End: 2020-12-18 | Stop reason: HOSPADM

## 2020-12-02 RX ORDER — OXYCODONE HYDROCHLORIDE 10 MG/1
10 TABLET ORAL EVERY 6 HOURS PRN
Status: DISCONTINUED | OUTPATIENT
Start: 2020-12-02 | End: 2020-12-18 | Stop reason: HOSPADM

## 2020-12-02 RX ORDER — METHOCARBAMOL 500 MG/1
500 TABLET, FILM COATED ORAL 4 TIMES DAILY
Status: DISCONTINUED | OUTPATIENT
Start: 2020-12-02 | End: 2020-12-11

## 2020-12-02 RX ORDER — DULOXETIN HYDROCHLORIDE 30 MG/1
60 CAPSULE, DELAYED RELEASE ORAL DAILY
Status: DISCONTINUED | OUTPATIENT
Start: 2020-12-02 | End: 2020-12-18 | Stop reason: HOSPADM

## 2020-12-02 RX ORDER — MEMANTINE HYDROCHLORIDE 5 MG/1
10 TABLET ORAL 2 TIMES DAILY
Status: DISCONTINUED | OUTPATIENT
Start: 2020-12-02 | End: 2020-12-18 | Stop reason: HOSPADM

## 2020-12-02 RX ADMIN — MEMANTINE HYDROCHLORIDE 10 MG: 10 TABLET ORAL at 08:12

## 2020-12-02 RX ADMIN — HYDROCODONE BITARTRATE AND ACETAMINOPHEN 1 TABLET: 10; 325 TABLET ORAL at 06:12

## 2020-12-02 RX ADMIN — METHOCARBAMOL 500 MG: 500 TABLET ORAL at 08:12

## 2020-12-02 RX ADMIN — ACETAMINOPHEN 1000 MG: 500 TABLET ORAL at 09:12

## 2020-12-02 RX ADMIN — PRAVASTATIN SODIUM 40 MG: 40 TABLET ORAL at 12:12

## 2020-12-02 RX ADMIN — HYDROCODONE BITARTRATE AND ACETAMINOPHEN 1 TABLET: 10; 325 TABLET ORAL at 01:12

## 2020-12-02 RX ADMIN — OXYCODONE HYDROCHLORIDE 5 MG: 5 TABLET ORAL at 08:12

## 2020-12-02 RX ADMIN — SENNOSIDES AND DOCUSATE SODIUM 1 TABLET: 8.6; 5 TABLET ORAL at 08:12

## 2020-12-02 RX ADMIN — DONEPEZIL HYDROCHLORIDE 10 MG: 10 TABLET ORAL at 12:12

## 2020-12-02 RX ADMIN — MUPIROCIN: 20 OINTMENT TOPICAL at 08:12

## 2020-12-02 RX ADMIN — METOPROLOL SUCCINATE 50 MG: 50 TABLET, EXTENDED RELEASE ORAL at 08:12

## 2020-12-02 RX ADMIN — GADOBUTROL 10 ML: 604.72 INJECTION INTRAVENOUS at 03:12

## 2020-12-02 RX ADMIN — METHOCARBAMOL 500 MG: 500 TABLET ORAL at 01:12

## 2020-12-02 RX ADMIN — HYDROCODONE BITARTRATE AND ACETAMINOPHEN 1 TABLET: 10; 325 TABLET ORAL at 12:12

## 2020-12-02 RX ADMIN — HEPARIN SODIUM 5000 UNITS: 5000 INJECTION INTRAVENOUS; SUBCUTANEOUS at 09:12

## 2020-12-02 RX ADMIN — PANTOPRAZOLE SODIUM 40 MG: 40 TABLET, DELAYED RELEASE ORAL at 06:12

## 2020-12-02 RX ADMIN — SODIUM CHLORIDE 100 ML/HR: 0.9 INJECTION, SOLUTION INTRAVENOUS at 12:12

## 2020-12-02 RX ADMIN — METHOCARBAMOL 500 MG: 500 TABLET ORAL at 05:12

## 2020-12-02 RX ADMIN — PRAVASTATIN SODIUM 40 MG: 40 TABLET ORAL at 08:12

## 2020-12-02 RX ADMIN — VANCOMYCIN HYDROCHLORIDE 1000 MG: 1 INJECTION, POWDER, LYOPHILIZED, FOR SOLUTION INTRAVENOUS at 05:12

## 2020-12-02 RX ADMIN — DULOXETINE HYDROCHLORIDE 60 MG: 60 CAPSULE, DELAYED RELEASE ORAL at 08:12

## 2020-12-02 RX ADMIN — ALTEPLASE 2 MG: 2.2 INJECTION, POWDER, LYOPHILIZED, FOR SOLUTION INTRAVENOUS at 05:12

## 2020-12-02 RX ADMIN — DONEPEZIL HYDROCHLORIDE 10 MG: 10 TABLET ORAL at 08:12

## 2020-12-02 RX ADMIN — ONDANSETRON 4 MG: 4 TABLET, ORALLY DISINTEGRATING ORAL at 12:12

## 2020-12-02 RX ADMIN — CEFTAROLINE FOSAMIL 600 MG: 600 POWDER, FOR SOLUTION INTRAVENOUS at 05:12

## 2020-12-02 NOTE — ASSESSMENT & PLAN NOTE
70 F with known MRSA bacteremia with thoracic osteomyelitis presenting as transfer from SNF after CT T spine concerning for increased erosion of T9/T10 vertebral bodies as well as bilateral T9 pedicle fractures:    - Admitted to [facility] [place]  - q4 neurochecks  - Preop labs reviewed  - MRI C/T/L W/WO shows known T9-T10 osteomyelitis/discitis with intervertebral disc space abscess and epidural phlegmon, causing some mass effect on spinal cord but no cord signal changes. Alignment grossly maintained.  - Plan for stabilization with T9-T10 corpectomy and fusion above/below. Tentatively scheduled for 12/8. Will obtain intraoperative cultures.  - TLSO brace at all times  - Activity: Bed rest with spinal precautions, log roll, HOB < 30 at all times.  - Pain control: scheduled Tylenol and Robaxin, Oxycodone PRN  - Preoperative Clearance:  consulted for preoperative risk assessment and clearance.   - RCRI 10.1% risk.  Patient is moderate risk for moderate risk surgery, benefits of surgery outweigh risk, ok to proceed from medicine standpoint.    - Recommend repeat TTE as outpatient given changes from 8/10 - 10/12. Pt may have had NSTEMI. Endocarditis felt less likely.  - H/o MRSA Bacteremia: Afebrile, no leukocytosis. ESR 97, CRP 12.4.    - ID following, appreciate assistance. Added ceftaroline.   - CAD, s/p Cardiac Stent: MI in 2000 which required stent placements. Pt takes ASA and Plavix, last dose 11/30.   - Hold ASA/Plavix preoperatively  - Alzheimer's: Continue home memantine and donepezil  - HLD: Continue home pravastatin  -DVT prophylaxis: SHAYLA's, SCD's, SQH  -Bowel regimen: senna BID     Contact Neurosurgery with any questions, concerns, or neuro changes.    D/w Dr. Gongora

## 2020-12-02 NOTE — HOSPITAL COURSE
12/2: Patient admitted due to progression of bony erosion at T9-10 from osteomyelitis, now with bilateral T9 pedicle fractures. NAEON. AFVSS. Patient reports mid-back pain, progressively worsening, radiating around to right side. Reports her leg strength has been improving. Denies new weakness, numbness, and b/b dysfunction. Plan for 2 level corpectomy and fusion, tentatively scheduled for 12/8. HM consulted for preop clearance. Continue current course of Vanc for prior MRSA infection, ID consulted for recs.  12/3: NAEON. Patient compliant with spinal precautions, HOB < 30. No changes on exam. Will obtain CT cervical spine to further assess prevertebral edema at C5-6 on MRI.  Patient denies neck pain or radicular upper extremity pain. Voiding spontaneously. Plan for OR Tuesday.  12/4: NAEON. Vitals and labs stable. Pt reports continued mid-back pain, somewhat improved on current pain regimen. Denies weakness, numbness/paresthesias, and b/b dysfunction. Denies neck pain. CT C-spine stable with degenerative changes, no significant prevertebral swelling. Pending OR Tuesday.  12/5: NAEO. AFVSS. Neuro stable. No complaints this morning. Plan for OR Tuesday.  12/6: NAEO. AFVSS. Neuro stable. No complaints this morning. Plan for OR Tuesday. Blood cultures with NGTD. Continue ceftaroline. Will get OR cultures. Continue SQH.  12/7: NAEON. AFVSS. Neuro exam stable. Reports pain in R knee today with movement after being turned last night. Otherwise no acute complaints. Denies new weakness and numbness/paresthesias. Urinating via Purewick, denies difficulty. Plan for OR tomorrow. Continue ceftaroline.  12/8: OR today, to ICU postop for close monitoring  12/9: NAEON. AFVSS. Patient 1 day S/P T9-10 corpectomy; T7-12 posterior fusion. Pain being managed with Oxycodone 5 & 10 mg PRN, as well as Morphine 2 mg. Patient endorses Nausea and is prescribed Phenergan. Nurse reports denied Phenergan when offered. Patient prescribed  "Ondansetron as well. Patient A&Ox3 and Neuro exam stable. Patient reports having BM yesterday. Sodium level 131 in the AM.  12/10: stepdown to NSGY, d/c Lopressor, continue abx for MRSA bacteremia, add regular diet, repeat CBC stable, replace lytes PRN  12/11: NAEON. AFVSS. No episodes of hypotension. Drain output decreased, will remove 1 HV today. H/H improved s/p transfusion of 1U yesterday, patient reports she "feels better." Remains on 3L via nasal cannula, neuro exam stable. Denies weakness, paresthesias, b/b dysfunction, chest pain, SOB.  12/12: No acute events overnight. Pt AAOx4 and denies any numbness and tingling. Pt on 3L NC. Pt c/o pain during shift. Prn pain medication given per MAR.  12/13: Pt AAOx4, VSS, tele in place - sinus ryann to NSR noted, sats stable on 2L NC, afebrile. Chest and abdominal xray completed at bedside today. K 3.1 this AM - IV replacement given per order. Abx continued. Pt c/o back pain - pt reports moderate relief with positioning and PRN medications. WV in place over back incision, good seal. L posterior hemovac drain in place at full suction. Skin bruised but intact - continuing to encourage frequent position changes but pt refuses some turns 2/2 pain. Pressure points protected and waffle mattress in place. Pt voids per external catheter. Pt with 1 moderate BM this shift.   12/14: NAEON. AFVSS. O2 sats stable on 2L via nasal cannula. Vanc subtherapeutic, discussed with CM regarding ability of rehab to continue vanc dosing as next trough due tomorrow evening. Reports she was able to sit EOB with therapy, not able to get OOB to chair. Bed mobility improved, able to turn self well. Neuro exam remains stable. Voiding spontaneously. WV and final HV removed. Denies chest pain, SOB. Pending discharge to [facility], medically stable.   12/15: NAEON. AFVSS. Slight improvement in pain control resulting in improved proximal pain limited weakness on exam. Medically stable for discharge to [facility]. " Voiding spontaneously. Motivated to progress with therapy.   12/16: LUCAS LEROYS. Pt reports her back pain fluctuates, c/o of pain radiating to R hip which is not new. Controlled with current pain regimen. Denies any new/worsening weakness or numbness/paresthesias. Voiding spontaneously, BM yesterday. Has been able to stand with therapy. Last Vanc trough 12.9, plan for DC to rehab once therapeutic.  12/17: LUCAS LEROYS. Pt resting this morning, c/o continued incisional back pain and R hip pain. Neuro exam stable. RLE more limited today 2/2 hip pain but grossly full strength. Will check XR of pelvis. Denies any new weakness, paresthesias, and b/b dysfunction. Repeat Vanc trough tonight, likely plan for DC to rehab tomorrow.  12/18: LUCAS LEROYS. Neuro stable. Denies new weakness, numbness, bowel or bladder dysfunction. Vanc dosage increased to reach therapeutic level. Patient without any further questions or concerns. Pelvic xray negative for any acute processes.

## 2020-12-02 NOTE — ASSESSMENT & PLAN NOTE
70 y.o. female Alzheimer's, HTN, HLD, CAD, YOVANI, and morbid obesity who was recently hospitalized x2 for MRSA bacteremia complicated by T9-10 osteodiscitis and T8-10 epidural phlegmon with associated paraverterbral abscesses. Patient was discharged to [facility] on 10/20 with plan for Vancomycin IV x 8 weeks (estimated end date 12/3). Interval imaging obtained 11/30 revealed worsening bony destruction, unstable T9 fracture. Transferred for [facility]. [facility] medicine consulted for pre-operative risk assessment.     Patient reports she had an MI in 2000 which required stent placements. Per chart review patient had possible NSTEMI on 8/10 admission but was not a candidate for cath. TTE on 8/10 and 10/12 shown below. Patient previously had a smoking history of 2ppd for 50 years but quit 20 years ago. She denies alcohol use. She denies history of any pulmonary disease, CHF, or DM.    TTE: 8/10:  Normal left ventricular systolic function. The estimated ejection fraction is 55%.  · Normal LV diastolic function.  · Normal right ventricular systolic function.  · Very TDS  TTE 10/12:  · Technically challenging study  · With normal systolic function. The estimated ejection fraction is 65%.  · Indeterminate diastolic function.  · Normal right ventricular systolic function.  · Severe left atrial enlargement.  · Mild-to-moderate aortic regurgitation. Valve morphology not well seen. ecentric posterioly directed jet. Consider RAMÓN if clinically indicated.  · Normal central venous pressure (3 mmHg).      Surgical Risk Assessment:    Active Cardiac Issues:  Active decompensated heart failure? No   Unstable angina?  No   Significant uncontrolled arrhythmias? No   Severe valvular heart disease-Aortic or Mitral Stenosis? No   Recent MI or coronary revascularization < 30 days? No     Cardiac Risk Factors:  High risk surgery? Yes   History of CAD/ischemic heart disease? Yes   History of cerebrovascular disease? No   History of  compensated heart failure? No   Type 2 diabetes requiring insulin? No   Serum Creatinine > 2? No   Total cardiac risk factors 2     Functional mets <4    < 4 METs -unable to walk > 2 blocks on level ground without stopping due to symptoms  - eating, dressing, toileting, walking indoors, light housework. POOR   > 4 METs -climbing > 1 flight of stairs without stopping  -walking up hill > 1-2 blocks  -scrubbing floors  -moving furniture  - golf, bowling, dancing or tennis  -running short distance MODERATE to EXCELLENT           Perioperative Risk Assessment:  RCRI Score 2, Class 3 risk with 10.1% risk of cardiopulmonary complications (Duceppe, 2017). <4 METS.  Patient is at intermediate risk for perioperative cardiopulmonary complications. Given patient's clinical condition, benefits of surgery outweighs risk.     - Of note, patient's TTE has changed from 8/10 to 10/12, unclear if this is from prior NSTEMI. Endocarditis is less likely given patient has been afebrile with negative blood cultures but will defer to ID. Would recommend repeat TTE outpatient  .

## 2020-12-02 NOTE — CONSULTS
[facility] Hospital Medicine  Consult Note    Patient Name: Martina Betancourt  MRN: [id number]  Admission Date: 12/1/2020  Hospital Length of Stay: 1 days  Attending Physician: Luis E Monteiro MD   Primary Care Provider: Joe Fuller Iii, MD     [facility] Medicine Team: Networked reference to record PCT  Azeem Hernandez MD      Patient information was obtained from patient, past medical records and ER records.     Inpatient consult to Hospital Medicine-General  Consult performed by: Azeem Hernandez MD  Consult ordered by: Tania Berry MD  Reason for consult: pre-op clearance        Subjective:     Principal Problem: <principal problem not specified>    Chief Complaint: No chief complaint on file.       HPI: 70 y.o. female Alzheimer's, HTN, HLD, CAD, YOVANI, and morbid obesity who was recently hospitalized x2 for MRSA bacteremia complicated by pneumonia and possible UTI, treated with linozolid x8 days and cipro and discharged 10/1. She was then [place] [facility] [place] with severe back pain. MRI revealed T9-10 osteodiscitis and T8-10 epidural phlegmon with associated paraverterbral abscesses. Plan at the time was for conservative non-surgical treatment. She underwent needle aspiration of T9-10 disc space on 10/16, cultures were negative although she had already been undergoing antibiotic treatment. Repeat BCx remained no growth and she remained afebrile and neurologically stable. Patient was discharged to [facility] on 10/20 with plan for Vancomycin IV x 8 weeks (estimated end date 12/3).     Patient presents today after interval imaging obtained 11/30 revealed worsening bony destruction, unstable T9 fracture. Transferred for [facility]. [facility] medicine consulted for pre-operative risk assessment.     Past Medical History:   Diagnosis Date    Alzheimer disease     Alzheimer disease     Coronary artery disease     Hyperlipidemia     Hypertension     Myopathy     Non-ST  elevation (NSTEMI) myocardial infarction     Obesity     Pneumonia     Sleep apnea        Past Surgical History:   Procedure Laterality Date    APPENDECTOMY      APPENDECTOMY      CHOLECYSTECTOMY      CORONARY STENT PLACEMENT      HYSTERECTOMY      TONSILLECTOMY         Review of patient's allergies indicates:   Allergen Reactions    Hydroxyzine hcl     Tizanidine        Current Facility-Administered Medications on File Prior to Encounter   Medication    [DISCONTINUED] acetaminophen tablet 500 mg    [DISCONTINUED] acetaminophen tablet 650 mg    [DISCONTINUED] albuterol-ipratropium 2.5 mg-0.5 mg/3 mL nebulizer solution 3 mL    [DISCONTINUED] aluminum & magnesium hydroxide-simethicone 400-400-40 mg/5 mL suspension 30 mL    [DISCONTINUED] aspirin EC tablet 81 mg    [DISCONTINUED] bisacodyL EC tablet 5 mg    [DISCONTINUED] calcium carbonate 200 mg calcium (500 mg) chewable tablet 500 mg    [DISCONTINUED] clopidogreL tablet 75 mg    [DISCONTINUED] donepeziL tablet 10 mg    [DISCONTINUED] DULoxetine DR capsule 60 mg    [DISCONTINUED] enoxaparin injection 40 mg    [DISCONTINUED] ferrous sulfate tablet 325 mg    [DISCONTINUED] furosemide tablet 40 mg    [DISCONTINUED] guaiFENesin 12 hr tablet 600 mg    [DISCONTINUED] HYDROcodone-acetaminophen  mg per tablet 1 tablet    [DISCONTINUED] isosorbide mononitrate 24 hr tablet 60 mg    [DISCONTINUED] lisinopriL tablet 10 mg    [DISCONTINUED] melatonin tablet 6 mg    [DISCONTINUED] memantine tablet 10 mg    [DISCONTINUED] methocarbamoL tablet 500 mg    [DISCONTINUED] metoprolol succinate (TOPROL-XL) 24 hr tablet 50 mg    [DISCONTINUED] miconazole nitrate 2% ointment    [DISCONTINUED] ondansetron disintegrating tablet 4 mg    [DISCONTINUED] pantoprazole EC tablet 40 mg    [DISCONTINUED] pravastatin tablet 40 mg    [DISCONTINUED] sodium chloride 0.9% flush 10 mL    [DISCONTINUED] sodium chloride 0.9% flush 10 mL    [DISCONTINUED] sodium  chloride 0.9% flush 10 mL    [DISCONTINUED] vancomycin in dextrose 5 % 1 gram/250 mL IVPB 1,000 mg     Current Outpatient Medications on File Prior to Encounter   Medication Sig    acetaminophen (TYLENOL) 325 MG tablet Take 2 tablets (650 mg total) by mouth every 6 (six) hours as needed (HEADACHE, TEMPERATURE - FEVER > 100.4).    albuterol-ipratropium (DUO-NEB) 2.5 mg-0.5 mg/3 mL nebulizer solution Take 3 mLs by nebulization every 6 (six) hours as needed for Wheezing or Shortness of Breath. Rescue    aluminum & magnesium hydroxide-simethicone (MYLANTA MAX STRENGTH) 400-400-40 mg/5 mL suspension Take 30 mLs by mouth every 6 (six) hours as needed for Indigestion.    aspirin (ECOTRIN) 81 MG EC tablet Take 81 mg by mouth once daily.    bisacodyL (DULCOLAX) 5 mg EC tablet Take 5 mg by mouth daily as needed for Constipation.    calcium carbonate (TUMS) 200 mg calcium (500 mg) chewable tablet Take 1 tablet (500 mg total) by mouth 2 (two) times daily as needed.    clopidogreL (PLAVIX) 75 mg tablet Take 1 tablet (75 mg total) by mouth once daily.    donepeziL (ARICEPT) 10 MG tablet Take 10 mg by mouth every evening.    DULoxetine (CYMBALTA) 60 MG capsule Take 60 mg by mouth once daily.    enoxaparin (LOVENOX) 40 mg/0.4 mL Syrg Inject 0.4 mLs (40 mg total) into the skin once daily.    ferrous sulfate 324 mg (65 mg iron) TbEC Take 325 mg by mouth every evening.    furosemide (LASIX) 40 MG tablet Take 1 tablet (40 mg total) by mouth once daily.    guaiFENesin (MUCINEX) 600 mg 12 hr tablet Take 1,200 mg by mouth 2 (two) times daily.    HYDROcodone-acetaminophen (NORCO)  mg per tablet Take 1 tablet by mouth every 6 (six) hours as needed for Pain.    isosorbide mononitrate (IMDUR) 60 MG 24 hr tablet Take 60 mg by mouth once daily.    lisinopriL 10 MG tablet Take 1 tablet (10 mg total) by mouth once daily.    melatonin (MELATIN) 3 mg tablet Take 2 tablets (6 mg total) by mouth nightly as needed for Insomnia.     memantine (NAMENDA) 10 MG Tab Take 1 tablet (10 mg total) by mouth 2 (two) times daily.    methocarbamoL (ROBAXIN) 500 MG Tab Take 500 mg by mouth 4 (four) times daily.    metoprolol succinate (TOPROL-XL) 50 MG 24 hr tablet Take 50 mg by mouth once daily.    miconazole nitrate 2% (MICOTIN) 2 % Oint Apply topically 2 (two) times daily.    ondansetron (ZOFRAN-ODT) 4 MG TbDL Take 1 tablet (4 mg total) by mouth every 8 (eight) hours as needed.    pantoprazole (PROTONIX) 40 MG tablet Take 1 tablet (40 mg total) by mouth before breakfast.    pravastatin (PRAVACHOL) 40 MG tablet Take 40 mg by mouth every evening.    sodium chloride 0.9% (NORMAL SALINE FLUSH) injection Inject 10 mLs into the vein as needed.    vancomycin HCl in 5 % dextrose (VANCOMYCIN IN DEXTROSE 5 %) 1 gram/250 mL Soln Inject 250 mLs (1,000 mg total) into the vein once daily.     Family History     None        Tobacco Use    Smoking status: Unknown If Ever Smoked   Substance and Sexual Activity    Alcohol use: Not Currently    Drug use: Never    Sexual activity: Not Currently     Review of Systems   Constitutional: Negative for activity change, appetite change, chills and fever.   HENT: Negative for congestion.    Respiratory: Negative for cough and shortness of breath.    Cardiovascular: Negative for chest pain, palpitations and leg swelling.   Gastrointestinal: Negative for abdominal pain, constipation, diarrhea, nausea and vomiting.   Genitourinary: Negative for difficulty urinating and dysuria.   Musculoskeletal: Positive for back pain. Negative for arthralgias and myalgias.   Neurological: Positive for weakness. Negative for dizziness, light-headedness and numbness.   Psychiatric/Behavioral: Negative for confusion.     Objective:     Vital Signs (Most Recent):  Temp: 98 °F (36.7 °C) (12/02/20 0741)  Pulse: 70 (12/02/20 0741)  Resp: 13 (12/02/20 0741)  BP: (!) 154/92 (12/02/20 0741)  SpO2: 99 % (12/02/20 0741) Vital Signs (24h  Range):  Temp:  [97.6 °F (36.4 °C)-98.5 °F (36.9 °C)] 98 °F (36.7 °C)  Pulse:  [70-84] 70  Resp:  [13-20] 13  SpO2:  [93 %-99 %] 99 %  BP: (106-154)/(52-92) 154/92        There is no height or weight on file to calculate BMI.    Physical Exam  HENT:      Head: Normocephalic and atraumatic.      Mouth/Throat:      Mouth: Mucous membranes are moist.   Eyes:      Pupils: Pupils are equal, round, and reactive to light.   Cardiovascular:      Rate and Rhythm: Normal rate and regular rhythm.      Heart sounds: No murmur. No gallop.    Pulmonary:      Effort: Pulmonary effort is normal. No respiratory distress.      Breath sounds: Normal breath sounds.   Abdominal:      General: Bowel sounds are normal.      Tenderness: There is no abdominal tenderness. There is no guarding.   Skin:     General: Skin is warm and dry.   Neurological:      Mental Status: She is alert and oriented to person, place, and time.   Psychiatric:         Mood and Affect: Mood normal.         Behavior: Behavior normal.         Significant Labs: All pertinent labs within the past 24 hours have been reviewed.    Significant Imaging: I have reviewed and interpreted all pertinent imaging results/findings within the past 24 hours.    Assessment/Plan:     Pre-operative cardiovascular examination  70 y.o. female Alzheimer's, HTN, HLD, CAD, YOVANI, and morbid obesity who was recently hospitalized x2 for MRSA bacteremia complicated by T9-10 osteodiscitis and T8-10 epidural phlegmon with associated paraverterbral abscesses. Patient was discharged to [facility] on 10/20 with plan for Vancomycin IV x 8 weeks (estimated end date 12/3). Interval imaging obtained 11/30 revealed worsening bony destruction, unstable T9 fracture. Transferred for [facility]. [facility] medicine consulted for pre-operative risk assessment.     Patient reports she had an MI in 2000 which required stent placements. Per chart review patient had possible NSTEMI on 8/10 admission but was  not a candidate for cath. TTE on 8/10 and 10/12 shown below. Patient previously had a smoking history of 2ppd for 50 years but quit 20 years ago. She denies alcohol use. She denies history of any pulmonary disease, CHF, or DM.    TTE: 8/10:  Normal left ventricular systolic function. The estimated ejection fraction is 55%.  · Normal LV diastolic function.  · Normal right ventricular systolic function.  · Very TDS  TTE 10/12:  · Technically challenging study  · With normal systolic function. The estimated ejection fraction is 65%.  · Indeterminate diastolic function.  · Normal right ventricular systolic function.  · Severe left atrial enlargement.  · Mild-to-moderate aortic regurgitation. Valve morphology not well seen. ecentric posterioly directed jet. Consider RAMÓN if clinically indicated.  · Normal central venous pressure (3 mmHg).      Surgical Risk Assessment:    Active Cardiac Issues:  Active decompensated heart failure? No   Unstable angina?  No   Significant uncontrolled arrhythmias? No   Severe valvular heart disease-Aortic or Mitral Stenosis? No   Recent MI or coronary revascularization < 30 days? No     Cardiac Risk Factors:  High risk surgery? Yes   History of CAD/ischemic heart disease? Yes   History of cerebrovascular disease? No   History of compensated heart failure? No   Type 2 diabetes requiring insulin? No   Serum Creatinine > 2? No   Total cardiac risk factors 2     Functional mets <4    < 4 METs -unable to walk > 2 blocks on level ground without stopping due to symptoms  - eating, dressing, toileting, walking indoors, light housework. POOR   > 4 METs -climbing > 1 flight of stairs without stopping  -walking up hill > 1-2 blocks  -scrubbing floors  -moving furniture  - golf, bowling, dancing or tennis  -running short distance MODERATE to EXCELLENT           Perioperative Risk Assessment:  RCRI Score 2, Class 3 risk with 10.1% risk of cardiopulmonary complications (Duceppe, 2017). <4 METS.  Patient  is at intermediate risk for perioperative cardiopulmonary complications. Given patient's clinical condition, benefits of surgery outweighs risk.     - Of note, patient's TTE has changed from 8/10 to 10/12, unclear if this is from prior NSTEMI. Endocarditis is less likely given patient has been afebrile with negative blood cultures but will defer to ID. Would recommend repeat TTE outpatient  .          VTE Risk Mitigation (From admission, onward)         Ordered     IP VTE HIGH RISK PATIENT  Once      12/01/20 2235     Place sequential compression device  Until discontinued      12/01/20 2235     Place SHAYLA hose  Until discontinued      12/01/20 2235                    Thank you for your consult. I will sign off. Please contact us if you have any additional questions.    Azeem Hernandez MD  Department of Hospital Medicine   [facility]

## 2020-12-02 NOTE — NURSING
D/C transfer to [facility] via [facility], [facility]. A/OX3 in NAD. Patient had support boots d/t bed ridden and breakdown to right heel. Patient stated she did not want the boots, but was informed that she paid for them and she should keep them for future use.  This nurse was later informed that the EMT dropped the boots off in the ED stating that the patient said she didn't want them.   Report called to [facility].

## 2020-12-02 NOTE — ASSESSMENT & PLAN NOTE
70 F with known MRSA bacteremia with thoracic osteomyelitis presenting as transfer from SNF after CT T spine concerning for b/l T9 pedicle fractures:    Admit NSGY  q4 neurochecks  Preop labs pending - CBC, CMP, T&S, Coags, inflammatory markers  MRI C/T/L W/WO  ID, continue Vancomycin  Hold ASA/Plavix/Lovenox  TLSO ordered from DME  LOG ROLL, STRICT SPINE PRECAUTIONS, HOB FLAT  NPO after MN  Continue home meds  Pain control  Page w/exam change    D/w Dr Monteiro

## 2020-12-02 NOTE — SUBJECTIVE & OBJECTIVE
Medications Prior to Admission   Medication Sig Dispense Refill Last Dose    acetaminophen (TYLENOL) 325 MG tablet Take 2 tablets (650 mg total) by mouth every 6 (six) hours as needed (HEADACHE, TEMPERATURE - FEVER > 100.4).  0     albuterol-ipratropium (DUO-NEB) 2.5 mg-0.5 mg/3 mL nebulizer solution Take 3 mLs by nebulization every 6 (six) hours as needed for Wheezing or Shortness of Breath. Rescue 1 Box 0     aspirin (ECOTRIN) 81 MG EC tablet Take 81 mg by mouth once daily.       bisacodyL (DULCOLAX) 5 mg EC tablet Take 5 mg by mouth daily as needed for Constipation.       clopidogreL (PLAVIX) 75 mg tablet Take 1 tablet (75 mg total) by mouth once daily. 30 tablet 11     donepeziL (ARICEPT) 10 MG tablet Take 10 mg by mouth every evening.       DULoxetine (CYMBALTA) 60 MG capsule Take 60 mg by mouth once daily.       ferrous sulfate 324 mg (65 mg iron) TbEC Take 325 mg by mouth every evening.       furosemide (LASIX) 40 MG tablet Take 1 tablet (40 mg total) by mouth once daily. 30 tablet 11     guaiFENesin (MUCINEX) 600 mg 12 hr tablet Take 1,200 mg by mouth 2 (two) times daily.       HYDROcodone-acetaminophen (NORCO)  mg per tablet Take 1 tablet by mouth every 6 (six) hours as needed for Pain.       isosorbide mononitrate (IMDUR) 60 MG 24 hr tablet Take 60 mg by mouth once daily.       methocarbamoL (ROBAXIN) 500 MG Tab Take 500 mg by mouth 4 (four) times daily.       metoprolol succinate (TOPROL-XL) 50 MG 24 hr tablet Take 50 mg by mouth once daily.       miconazole nitrate 2% (MICOTIN) 2 % Oint Apply topically 2 (two) times daily.  0     pantoprazole (PROTONIX) 40 MG tablet Take 1 tablet (40 mg total) by mouth before breakfast. 30 tablet 11     pravastatin (PRAVACHOL) 40 MG tablet Take 40 mg by mouth every evening.       [DISCONTINUED] lisinopriL (PRINIVIL,ZESTRIL) 2.5 MG tablet Take 1 tablet (2.5 mg total) by mouth once daily. 90 tablet 3     [DISCONTINUED] memantine (NAMENDA XR) 28 mg  CSpX Take 28 mg by mouth once daily.          Review of patient's allergies indicates:   Allergen Reactions    Hydroxyzine hcl     Tizanidine        Past Medical History:   Diagnosis Date    Alzheimer disease     Alzheimer disease     Coronary artery disease     Hyperlipidemia     Hypertension     Myopathy     Non-ST elevation (NSTEMI) myocardial infarction     Obesity     Pneumonia     Sleep apnea      Past Surgical History:   Procedure Laterality Date    APPENDECTOMY      APPENDECTOMY      CHOLECYSTECTOMY      CORONARY STENT PLACEMENT      HYSTERECTOMY      TONSILLECTOMY       Family History     None        Tobacco Use    Smoking status: Unknown If Ever Smoked   Substance and Sexual Activity    Alcohol use: Not Currently    Drug use: Never    Sexual activity: Not Currently     Review of Systems   Constitutional: Negative for chills and fever.   HENT: Negative for trouble swallowing and voice change.    Eyes: Negative for photophobia and visual disturbance.   Respiratory: Negative for chest tightness and shortness of breath.    Cardiovascular: Negative for chest pain and palpitations.   Gastrointestinal: Positive for constipation. Negative for nausea and vomiting.   Genitourinary: Negative for difficulty urinating.   Musculoskeletal: Positive for back pain. Negative for neck pain.   Neurological: Negative for weakness and numbness.     Objective:        There is no height or weight on file to calculate BMI.  Vital Signs (Most Recent):  Temp: 98.3 °F (36.8 °C) (12/01/20 2221)  Pulse: 71 (12/01/20 2221)  Resp: 20 (12/01/20 2221)  BP: (!) 115/56 (12/01/20 2221)  SpO2: (!) 93 % (12/01/20 2221) Vital Signs (24h Range):  Temp:  [97.6 °F (36.4 °C)-98.5 °F (36.9 °C)] 98.3 °F (36.8 °C)  Pulse:  [70-73] 71  Resp:  [18-20] 20  SpO2:  [93 %-96 %] 93 %  BP: (106-121)/(52-60) 115/56                     Female External Urinary Catheter 11/13/20 1000 (Active)   Skin no redness;no breakdown 12/01/20 0705    Tolerance no signs/symptoms of discomfort 12/01/20 0705   Suction Continuous suction at 70 mmHg 12/01/20 0705   Date of last wick change 12/01/20 12/01/20 2044   Time of last wick change 0852 12/01/20 0705   Output (mL) 1100 mL 12/01/20 2000       Neurosurgery Physical Exam   Awake, alert, NAD  PERRL  EOMI  RICHTER antigravity, symmetrically  FS in BUE  FS in LLE  Pain limited weakness RLE (baseline per patient from prior injury), able to move limb antigravity 4/5  SILT    Significant Labs:  Recent Labs   Lab 11/30/20 0527 11/30/20  0528 12/01/20 0547   GLU 95  --  93     --  140   K 3.8  --  3.8   CL 98  --  101   CO2 28  --  30*   BUN 15  --  13   CREATININE 1.1  --  1.0   CALCIUM 10.0  --  9.9   MG  --  2.2  --      Recent Labs   Lab 11/30/20 0527   WBC 6.92   HGB 10.7*   HCT 35.4*        No results for input(s): LABPT, INR, APTT in the last 48 hours.  Microbiology Results (last 7 days)     Procedure Component Value Units Date/Time    Blood culture [[id number]]     Order Status: Sent Specimen: Blood           Significant Diagnostics:  No results found in the last 24 hours.

## 2020-12-02 NOTE — HPI
70 y.o. female known to [facility] [place] PMH of Alzheimer's dz, HTN, HLD, CAD, YOVANI, and morbid obesity who was recently hospitalized x2 for MRSA bacteremia complicated by pneumonia and possible UTI, treated with linozolid x8 days and cipro and discharged 10/1. She was then readmitted to [facility] on 10/8 with severe back pain. MRI revealed T9-10 osteodiscitis and T8-10 epidural phlegmon with associated paraverterbral abscesses. Spine infection likely hematogenous from under-treated bacteremia. Patient had no neurologic deficits on exam, no indications for emergent neurosurgical intervention, plan was made for conservative non-surgical treatment. She underwent needle aspiration of T9-10 disc space on 10/16, cultures were negative although she had already been undergoing antibiotic treatment. Repeat BCx remained no growth and she remained afebrile and neurologically stable. Patient was discharged to [facility] on 10/20 with plan for Vancomycin IV x 8 weeks (estimated end date 12/3).    Patient presents today after interval imaging obtained 11/30 revealed worsening bony destruction, unstable T9 fracture. Transferred [place] [facility].

## 2020-12-02 NOTE — HPI
71 yo female with a history of MRSA bactermia and dementia known to ID service and under the treatment for presumed MRSA osteodiscitis at T9-10.  She had been seen in Oct 2020 after having been admitted to and OSH and found to have MRSA bacteremia that was treated as inpt and dc'd on Zyvox to complete 7 d.  She then presented with excruciating back pain and found to have osteodiscitis at T9-10 and BL ST abscesses.  Blood cultures were no growth at that time and [facility] underwent aspiration at T9-10 and it was no growth.  She was discharged on Vancomycin to complete 6-8 weeks EOC 12/3/20 and she has been at [facility]  She had followed up in ID clinic on  and back pain was improving. Repeat MRI  was done and showed:  1. Altered signal intensity changes at T9/T10 with evidence of erosive focus about the opposing endplates and evidence of enhancing phlegmonous formation identified in the central component of the disc with central low signal intensity compatible with osteomyelitis/discitis.  Vertical dimensions of the area of interest cause extreme erosion along the anterior portion of the vertebral bodies of interest without evidence of any significant paraspinal component.  2. No significant soft tissue component.    NSGY fu was arranged.      FU CT scan was planned and done on  showin.Findings compatible with T9-10 discitis/osteomyelitis with surrounding perivertebral soft tissue thickening.  Evaluation for underlying abscess an epidural process limited given the lack of intravenous contrast.  If there is concern for underlying epidural process, consider further evaluation with an MRI of the thoracic spine.  No retropulsion is noted.  2. Interval development of fracture deformity through the bilateral T9 pedicles and posterior superior cortical margin of the T9 vertebral body    MRI C/T/L done showin. Findings in keeping with known T9-T10 and osteomyelitis/discitis with associated  intervertebral disc space abscess, epidural phlegmon with associated mass effect on the thoracic spinal cord and paravertebral phlegmon as discussed above.  Findings do not appear significantly improved when compared to most recent exam of 11/09/2020.   2. Subtle T2/STIR signal hyperintensity involving the T3-T4 intervertebral disc space, increased in conspicuity from prior examination. This could reflect an additional region of discitis/osteomyelitis and attention on follow-up exam is advised.   3. No evidence to suggest osteomyelitis/discitis in the cervical or lumbar spine at this time.  Multilevel degenerative change of the cervical and lumbar spine.   4. Moderate size hiatal hernia and left pleural effusion.     She was admitted and surgery planned.  Her back pain has worsened over the last 2-3 weeks but she denies systemic sing of infection.  Blood cultures are NGTD and crp 12.4.  She remains on vanc and denies PICC problems.  The patient denies any recent fever, chills, or sweats.

## 2020-12-02 NOTE — PROGRESS NOTES
VANCOMYCIN DOSING BY PHARMACY DISCONTINUATION NOTE    Martina Betancourt is a 70 y.o. female who had been consulted for vancomycin dosing.    The pharmacy consult for vancomycin dosing has been discontinued.     Vancomycin Dosing by Pharmacy Consult will sign-off. Please reconsult if necessary. Thank you for allowing us to participate in this patient's care.         Oliva Hernandez   [phone]

## 2020-12-02 NOTE — PROGRESS NOTES
[facility]  Neurosurgery  Progress Note    Subjective:     History of Present Illness: 70 y.o. female known to [facility] with PMH of Alzheimer's dz, HTN, HLD, CAD, YOVANI, and morbid obesity who was recently hospitalized x2 for MRSA bacteremia complicated by pneumonia and possible UTI, treated with linozolid x8 days and cipro and discharged 10/1. She was then readmitted to [facility] on 10/8 with severe back pain. MRI revealed T9-10 osteodiscitis and T8-10 epidural phlegmon with associated paraverterbral abscesses. Spine infection likely hematogenous from under-treated bacteremia. Patient had no neurologic deficits on exam, no indications for emergent neurosurgical intervention, plan was made for conservative non-surgical treatment. She underwent needle aspiration of T9-10 disc space on 10/16, cultures were negative although she had already been undergoing antibiotic treatment. Repeat BCx remained no growth and she remained afebrile and neurologically stable. Patient was discharged to [facility] on 10/20 with plan for Vancomycin IV x 8 weeks (estimated end date 12/3).    Patient presents today after interval imaging obtained 11/30 revealed worsening bony destruction, unstable T9 fracture. Transferred for [facility].     Post-Op Info:  Procedure(s) (LRB):  T9-T10 corpectomy, posterior instrumented fusion T7-T12. Aero. Globus, Depuy. Neuromonitoring. (N/A)         Interval History: Patient admitted due to progression of bony erosion at T9-10 from osteomyelitis, now with bilateral T9 pedicle fractures. NAEON. AFVSS. Patient reports mid-back pain, progressively worsening, radiating around to right side. Reports her leg strength has been improving. Denies new weakness, numbness, and b/b dysfunction. Plan for 2 level corpectomy and fusion, tentatively scheduled for 12/8. HM consulted for preop clearance. Continue current course of Vanc for prior MRSA infection, ID consulted for  recs.    Medications:  Continuous Infusions:  Scheduled Meds:   donepeziL  10 mg Oral QHS    DULoxetine  60 mg Oral Daily    memantine  10 mg Oral BID    methocarbamoL  500 mg Oral QID    metoprolol succinate  50 mg Oral Daily    mupirocin   Nasal BID    pantoprazole  40 mg Oral Before breakfast    pravastatin  40 mg Oral QHS    vancomycin (VANCOCIN) IVPB  1,000 mg Intravenous Q24H     PRN Meds:acetaminophen, albuterol-ipratropium, dextrose 50%, dextrose 50%, glucagon (human recombinant), glucose, glucose, glucose, HYDROcodone-acetaminophen, insulin aspart U-100, ondansetron     Review of Systems   Constitutional: Negative for chills and fever.   HENT: Negative for rhinorrhea and trouble swallowing.    Eyes: Negative for photophobia and visual disturbance.   Respiratory: Negative for cough and shortness of breath.    Cardiovascular: Negative for chest pain and palpitations.   Gastrointestinal: Negative for nausea and vomiting.   Genitourinary: Negative for difficulty urinating and dysuria.   Musculoskeletal: Positive for back pain. Negative for neck pain.   Skin: Negative for rash and wound.   Neurological: Negative for weakness, numbness and headaches.   Hematological: Negative for adenopathy. Does not bruise/bleed easily.   Psychiatric/Behavioral: Negative for behavioral problems and confusion.     Objective:     Weight: 125.4 kg (276 lb 7.3 oz)  Body mass index is 39.67 kg/m².  Vital Signs (Most Recent):  Temp: 98.1 °F (36.7 °C) (12/02/20 1313)  Pulse: 63 (12/02/20 1313)  Resp: 16 (12/02/20 1325)  BP: 134/80 (12/02/20 1313)  SpO2: 99 % (12/02/20 1313) Vital Signs (24h Range):  Temp:  [97.6 °F (36.4 °C)-98.5 °F (36.9 °C)] 98.1 °F (36.7 °C)  Pulse:  [63-84] 63  Resp:  [13-20] 16  SpO2:  [93 %-99 %] 99 %  BP: (106-154)/(52-92) 134/80     Date 12/02/20 0700 - 12/03/20 0659   Shift 0443-1590 5591-4158 1286-5312 24 Hour Total   INTAKE   P.O. 300   300   Shift Total(mL/kg) 300(2.4)   300(2.4)   OUTPUT    Urine(mL/kg/hr)  400  400   Shift Total(mL/kg)  400(3.2)  400(3.2)   Weight (kg) 125.4 125.4 125.4 125.4                   Female External Urinary Catheter 12/01/20 2200 (Active)   Skin no redness;no breakdown 12/02/20 0740   Tolerance no signs/symptoms of discomfort 12/02/20 0740   Suction Continuous suction at 70 mmHg 12/02/20 0740   Date of last wick change 12/01/20 12/02/20 0740   Time of last wick change 2200 12/02/20 0740   Output (mL) 400 mL 12/02/20 1500       Neurosurgery Physical Exam    General: well developed, well nourished, no distress.   Head: normocephalic, atraumatic  Neurologic: Alert and oriented. Thought content appropriate.  GCS: E4 V5 M6; Total: 15  Mental Status: Awake, Alert, Oriented x 4  Language: No aphasia  Speech: No dysarthria  Cranial nerves: face symmetric, tongue midline, CN II-XII grossly intact.   Eyes: pupils equal, round, reactive to light with accomodation, EOMI.   Ears: No drainage.   Pulmonary: normal respirations, no signs of respiratory distress  Abdomen: soft, non-distended, not tender to palpation    Sensory: intact to light touch throughout  Motor Strength: Moves all extremities spontaneously with good tone. No abnormal movements seen. Pain-limited hip flexion with decreased ROM bilaterally.    Strength  Deltoids Triceps Biceps Wrist Extension Wrist Flexion Hand    Upper: R 5/5 5/5 5/5 5/5 5/5 5/5    L 5/5 5/5 5/5 5/5 5/5 5/5     Iliopsoas Quadriceps Knee  Flexion Tibialis  anterior Gastro- cnemius EHL   Lower: R 3/5 5/5 5/5 5/5 5/5 5/5    L 3+/5 5/5 5/5 5/5 5/5 5/5     Clonus: absent  Vascular: No LE edema.   Skin: Skin is warm, dry and intact.      Significant Labs:  Recent Labs   Lab 12/01/20  0547 12/02/20  0021 12/02/20  0510   GLU 93 88 71    137 138   K 3.8 3.9 3.6    98 100   CO2 30* 30* 28   BUN 13 15 15   CREATININE 1.0 1.1 1.0   CALCIUM 9.9 10.3 10.1     Recent Labs   Lab 12/02/20  0021 12/02/20  0510   WBC 7.94 7.20   HGB 11.0* 10.7*   HCT  37.6 36.8*   * 307     Recent Labs   Lab 12/02/20 0021   INR 0.9   APTT 25.1     Microbiology Results (last 7 days)     Procedure Component Value Units Date/Time    Blood culture [[id number]] Collected: 12/02/20 0020    Order Status: Completed Specimen: Blood Updated: 12/02/20 0715     Blood Culture, Routine No Growth to date        All pertinent labs from the last 24 hours have been reviewed.    Significant Diagnostics:  I have reviewed and interpreted all pertinent imaging results/findings within the past 24 hours.    Assessment/Plan:     MRSA bacteremia  70 F with known MRSA bacteremia with thoracic osteomyelitis presenting as transfer from [facility] after CT T spine concerning for increased erosion of T9/T10 vertebral bodies as well as bilateral T9 pedicle fractures:    - Admitted to [facility]  -  neurochecks  - Preop labs reviewed  - MRI C/T/L W/WO shows known T9-T10 osteomyelitis/discitis with intervertebral disc space abscess and epidural phlegmon, causing some mass effect on spinal cord but no cord signal changes. Alignment grossly maintained.  - Plan for stabilization with T9-T10 corpectomy and fusion above/below. Tentatively scheduled for 12/8. Will obtain intraoperative cultures.  - TLSO brace at all times  - Activity: Bed rest with spinal precautions, log roll, HOB < 30 at all times.  - Pain control: scheduled Tylenol and Robaxin, Oxycodone PRN  - Preoperative Clearance:  consulted for preoperative risk assessment and clearance.   - RCRI 10.1% risk.  Patient is moderate risk for moderate risk surgery, benefits of surgery outweigh risk, ok to proceed from medicine standpoint.    - Recommend repeat TTE as outpatient given changes from 8/10 - 10/12. Pt may have had NSTEMI. Endocarditis felt less likely.  - H/o MRSA Bacteremia: Afebrile, no leukocytosis. ESR 97, CRP 12.4.    - Continue course of Vancomycin at this time.    - ID consulted for further recs.  - CAD, s/p Cardiac Stent: MI in 2000 which required  stent placements. Pt takes ASA and Plavix, last dose 11/30.   - Hold ASA/Plavix preoperatively  - Alzheimer's: Continue home memantine and donepezil  - HLD: Continue home pravastatin  -DVT prophylaxis: SHAYLA's, SCD's, SQH  -Bowel regimen: senna BID     Contact Neurosurgery with any questions, concerns, or neuro changes.    D/w Dr. Fransisca Morrison, PA-C  Neurosurgery  [facility]

## 2020-12-02 NOTE — ASSESSMENT & PLAN NOTE
70 F with known MRSA bacteremia with thoracic osteomyelitis presenting as transfer from SNF after CT T spine concerning for increased erosion of T9/T10 vertebral bodies as well as bilateral T9 pedicle fractures:    - Admitted to [facility]  - q4 neurochecks  - Preop labs reviewed  - MRI C/T/L W/WO shows known T9-T10 osteomyelitis/discitis with intervertebral disc space abscess and epidural phlegmon, causing some mass effect on spinal cord but no cord signal changes. Alignment grossly maintained.  - Plan for stabilization with T9-T10 corpectomy and fusion above/below. Tentatively scheduled for 12/8. Will obtain intraoperative cultures.  - TLSO brace at all times  - Activity: Bed rest with spinal precautions, log roll, HOB < 30 at all times.  - Pain control: scheduled Tylenol and Robaxin, Oxycodone PRN  - Preoperative Clearance:  consulted for preoperative risk assessment and clearance.   - RCRI 10.1% risk.  Patient is moderate risk for moderate risk surgery, benefits of surgery outweigh risk, ok to proceed from medicine standpoint.    - Recommend repeat TTE as outpatient given changes from 8/10 - 10/12. Pt may have had NSTEMI. Endocarditis felt less likely.  - H/o MRSA Bacteremia: Afebrile, no leukocytosis. ESR 97, CRP 12.4.    - Continue course of Vancomycin at this time.    - ID consulted for further recs.  - CAD, s/p Cardiac Stent: MI in 2000 which required stent placements. Pt takes ASA and Plavix, last dose 11/30.   - Hold ASA/Plavix preoperatively  - Alzheimer's: Continue home memantine and donepezil  - HLD: Continue home pravastatin  -DVT prophylaxis: SHAYLA's, SCD's, SQH  -Bowel regimen: senna BID     Contact Neurosurgery with any questions, concerns, or neuro changes.    D/w Dr Monteiro

## 2020-12-02 NOTE — NURSING
Red lumen of double lumen picc occluded, unable to flush or get blood return. Cath hung ordered by primary team, instilled at  0517 post MRI as to allow access to Purple lumen for procedure.  Cathflo withdrawn at 0650 and blood return obtained, 6 ml wasted and red lumen flushed with 20 cc saline.

## 2020-12-02 NOTE — PLAN OF CARE
12/02/20 1441   Post-Acute Status   Post-Acute Authorization Placement   Post-Acute Placement Status Awaiting Internal Medical Clearance   Discharge Plan   Discharge Plan A Skilled Nursing Facility   Discharge Plan B Home Health

## 2020-12-02 NOTE — PLAN OF CARE
Joe Fuller Iii, MD   [address] [place] [address] / [facility] [address]       [facility] [place] [id number] [facility] [address]  Phone: [phone] Fax: [phone]     Payor: MEDICARE / Plan: MEDICARE PART A & B / Product Type: Government /             12/02/20 1437   Discharge Assessment   Assessment Type Discharge Planning Assessment   Confirmed/corrected address and phone number on facesheet? Yes   Assessment information obtained from? Patient;Medical Record   Prior to hospitilization cognitive status: Alert/Oriented   Prior to hospitalization functional status: Completely Dependent;Assistive Equipment   Current cognitive status: Alert/Oriented   Current Functional Status: Assistive Equipment;Completely Dependent   Lives With child(jamaica), adult;grandchild(jamaica)  (Patient lives with her daughter Sharon.)   Able to Return to Prior Arrangements yes   Is patient able to care for self after discharge? Unable to determine at this time (comments)   Who are your caregiver(s) and their phone number(s)? Sharon Betancourt (daughter) [phone]   Patient's perception of discharge disposition home health   Readmission Within the Last 30 Days no previous admission in last 30 days   Patient currently being followed by outpatient case management? No   Equipment Currently Used at Home power chair;wheelchair;oxygen;walker, rolling;bedside commode   Do you have any problems affording any of your prescribed medications? No   Is the patient taking medications as prescribed? yes   Does the patient receive services at the [facility]? No   Discharge Plan A Skilled Nursing Facility   Discharge Plan B Home Health  ([facility] in the past)   DME Needed Upon Discharge  other (see comments)  (TBD)   Patient/Family in Agreement with Plan yes   Does the patient have family/friends to help with healtcare needs after discharge? yes

## 2020-12-02 NOTE — PROGRESS NOTES
Pharmacokinetic Initial Assessment: IV Vancomycin    Assessment/Plan:    Continue intravenous vancomycin with dose of 1000 mg every 24 hours  Desired empiric serum trough concentration is 10 to 20 mcg/mL  Draw vancomycin trough level 60 min prior to third dose on 12/04 at approximately 0200  Pharmacy will continue to follow and monitor vancomycin.      Please contact pharmacy at extension [phone] with any questions regarding this assessment.     Thank you for the consult,   Deangelo WANDER James       Patient brief summary:  Martina Betancourt is a 70 y.o. female initiated on antimicrobial therapy with IV Vancomycin for treatment of suspected skin & soft tissue infection    Drug Allergies:   Review of patient's allergies indicates:   Allergen Reactions    Hydroxyzine hcl     Tizanidine        Actual Body Weight:   125.4kg    Renal Function:   CrCl cannot be calculated (Unknown ideal weight.).,     Dialysis Method (if applicable):  N/A    CBC (last 72 hours):  Recent Labs   Lab Result Units 11/30/20  0527 12/02/20  0021   WBC K/uL 6.92 7.94   Hemoglobin g/dL 10.7* 11.0*   Hematocrit % 35.4* 37.6   Platelets K/uL 327 368*   Gran % % 56.0 54.2   Lymph % % 25.9 31.0   Mono % % 12.6 11.2   Eosinophil % % 4.8 2.8   Basophil % % 0.6 0.5   Differential Method  Automated Automated       Metabolic Panel (last 72 hours):  Recent Labs   Lab Result Units 11/29/20  0603 11/30/20  0527 11/30/20  0528 12/01/20  0547 12/02/20  0021   Sodium mmol/L 139 137  --  140 137   Potassium mmol/L 4.2 3.8  --  3.8 3.9   Chloride mmol/L 100 98  --  101 98   CO2 mmol/L 29 28  --  30* 30*   Glucose mg/dL 85 95  --  93 88   BUN mg/dL 12 15  --  13 15   Creatinine mg/dL 1.1 1.1  --  1.0 1.1   Albumin g/dL  --   --   --   --  2.8*   Total Bilirubin mg/dL  --   --   --   --  0.3   Alkaline Phosphatase U/L  --   --   --   --  117   AST U/L  --   --   --   --  12   ALT U/L  --   --   --   --  10   Magnesium mg/dL  --   --  2.2  --   --    Phosphorus mg/dL  --    --  4.4  --   --        Drug levels (last 3 results):  Recent Labs   Lab Result Units 11/29/20  1905 12/02/20  0021   Vancomycin-Trough ug/mL 10.1 12.9       Microbiologic Results:  Microbiology Results (last 7 days)     Procedure Component Value Units Date/Time    Blood culture [[id number]] Collected: 12/02/20 0020    Order Status: Sent Specimen: Blood Updated: 12/02/20 0030

## 2020-12-02 NOTE — HPI
70 y.o. female Alzheimer's, HTN, HLD, CAD, YOVANI, and morbid obesity who was recently hospitalized x2 for MRSA bacteremia complicated by pneumonia and possible UTI, treated with linozolid x8 days and cipro and discharged 10/1. She [place] [facility] [place] severe [place] pain. MRI revealed T9-10 osteodiscitis and T8-10 epidural phlegmon with associated paraverterbral abscesses. Plan at the time was for conservative non-surgical treatment. She underwent needle aspiration of T9-10 disc space on 10/16, cultures were negative although she had already been undergoing antibiotic treatment. Repeat BCx remained no growth and she remained afebrile and neurologically stable. Patient was discharged to [facility] on 10/20 with plan for Vancomycin IV x 8 weeks (estimated end date 12/3).     Patient presents today after interval imaging obtained 11/30 revealed worsening bony destruction, unstable T9 fracture. Transferred for [facility]. [facility] medicine consulted for pre-operative risk assessment.

## 2020-12-02 NOTE — SUBJECTIVE & OBJECTIVE
Past Medical History:   Diagnosis Date    Alzheimer disease     Alzheimer disease     Coronary artery disease     Hyperlipidemia     Hypertension     Myopathy     Non-ST elevation (NSTEMI) myocardial infarction     Obesity     Pneumonia     Sleep apnea        Past Surgical History:   Procedure Laterality Date    APPENDECTOMY      APPENDECTOMY      CHOLECYSTECTOMY      CORONARY STENT PLACEMENT      HYSTERECTOMY      TONSILLECTOMY         Review of patient's allergies indicates:   Allergen Reactions    Hydroxyzine hcl     Tizanidine        Medications:  Medications Prior to Admission   Medication Sig    acetaminophen (TYLENOL) 325 MG tablet Take 2 tablets (650 mg total) by mouth every 6 (six) hours as needed (HEADACHE, TEMPERATURE - FEVER > 100.4).    albuterol-ipratropium (DUO-NEB) 2.5 mg-0.5 mg/3 mL nebulizer solution Take 3 mLs by nebulization every 6 (six) hours as needed for Wheezing or Shortness of Breath. Rescue    aluminum & magnesium hydroxide-simethicone (MYLANTA MAX STRENGTH) 400-400-40 mg/5 mL suspension Take 30 mLs by mouth every 6 (six) hours as needed for Indigestion.    aspirin (ECOTRIN) 81 MG EC tablet Take 81 mg by mouth once daily.    bisacodyL (DULCOLAX) 5 mg EC tablet Take 5 mg by mouth daily as needed for Constipation.    calcium carbonate (TUMS) 200 mg calcium (500 mg) chewable tablet Take 1 tablet (500 mg total) by mouth 2 (two) times daily as needed.    clopidogreL (PLAVIX) 75 mg tablet Take 1 tablet (75 mg total) by mouth once daily.    donepeziL (ARICEPT) 10 MG tablet Take 10 mg by mouth every evening.    DULoxetine (CYMBALTA) 60 MG capsule Take 60 mg by mouth once daily.    enoxaparin (LOVENOX) 40 mg/0.4 mL Syrg Inject 0.4 mLs (40 mg total) into the skin once daily.    ferrous sulfate 324 mg (65 mg iron) TbEC Take 325 mg by mouth every evening.    furosemide (LASIX) 40 MG tablet Take 1 tablet (40 mg total) by mouth once daily.    guaiFENesin (MUCINEX) 600 mg  12 hr tablet Take 1,200 mg by mouth 2 (two) times daily.    HYDROcodone-acetaminophen (NORCO)  mg per tablet Take 1 tablet by mouth every 6 (six) hours as needed for Pain.    isosorbide mononitrate (IMDUR) 60 MG 24 hr tablet Take 60 mg by mouth once daily.    lisinopriL 10 MG tablet Take 1 tablet (10 mg total) by mouth once daily.    melatonin (MELATIN) 3 mg tablet Take 2 tablets (6 mg total) by mouth nightly as needed for Insomnia.    memantine (NAMENDA) 10 MG Tab Take 1 tablet (10 mg total) by mouth 2 (two) times daily.    methocarbamoL (ROBAXIN) 500 MG Tab Take 500 mg by mouth 4 (four) times daily.    metoprolol succinate (TOPROL-XL) 50 MG 24 hr tablet Take 50 mg by mouth once daily.    miconazole nitrate 2% (MICOTIN) 2 % Oint Apply topically 2 (two) times daily.    ondansetron (ZOFRAN-ODT) 4 MG TbDL Take 1 tablet (4 mg total) by mouth every 8 (eight) hours as needed.    pantoprazole (PROTONIX) 40 MG tablet Take 1 tablet (40 mg total) by mouth before breakfast.    pravastatin (PRAVACHOL) 40 MG tablet Take 40 mg by mouth every evening.    sodium chloride 0.9% (NORMAL SALINE FLUSH) injection Inject 10 mLs into the vein as needed.    vancomycin HCl in 5 % dextrose (VANCOMYCIN IN DEXTROSE 5 %) 1 gram/250 mL Soln Inject 250 mLs (1,000 mg total) into the vein once daily.     Antibiotics (From admission, onward)    Start     Stop Route Frequency Ordered    12/02/20 0900  mupirocin 2 % ointment      12/07 0859 Nasl 2 times daily 12/01/20 2239    12/02/20 0300  vancomycin in dextrose 5 % 1 gram/250 mL IVPB 1,000 mg      -- IV Every 24 hours (non-standard times) 12/02/20 0148        Antifungals (From admission, onward)    None        Antivirals (From admission, onward)    None           Immunization History   Administered Date(s) Administered    Influenza (FLUAD) - Quadrivalent - Adjuvanted - PF *Preferred* (65+) 10/01/2020    PPD Test 07/29/2020, 10/13/2020       Family History     None        Social  History     Socioeconomic History    Marital status:      Spouse name: Not on file    Number of children: Not on file    Years of education: Not on file    Highest education level: Not on file   Occupational History    Not on file   Social Needs    Financial resource strain: Not on file    Food insecurity     Worry: Not on file     Inability: Not on file    Transportation needs     Medical: Not on file     Non-medical: Not on file   Tobacco Use    Smoking status: Unknown If Ever Smoked   Substance and Sexual Activity    Alcohol use: Not Currently    Drug use: Never    Sexual activity: Not Currently   Lifestyle    Physical activity     Days per week: Not on file     Minutes per session: Not on file    Stress: Not on file   Relationships    Social connections     Talks on phone: Not on file     Gets together: Not on file     Attends Anabaptism service: Not on file     Active member of club or organization: Not on file     Attends meetings of clubs or organizations: Not on file     Relationship status: Not on file   Other Topics Concern    Not on file   Social History Narrative    Not on file     Review of Systems   Constitutional: Negative for appetite change, chills, diaphoresis, fatigue, fever and unexpected weight change.   HENT: Negative for congestion, ear pain, hearing loss, sore throat and tinnitus.    Eyes: Negative for pain, redness and visual disturbance.   Respiratory: Negative for cough, chest tightness, shortness of breath and wheezing.    Cardiovascular: Negative for chest pain.   Gastrointestinal: Negative for abdominal pain, constipation, diarrhea, nausea and vomiting.   Endocrine: Negative for cold intolerance and heat intolerance.   Genitourinary: Negative for decreased urine volume, difficulty urinating, dysuria, flank pain, frequency, hematuria and urgency.   Musculoskeletal: Positive for back pain. Negative for arthralgias, myalgias and neck pain.   Skin: Negative for rash  and wound.   Allergic/Immunologic: Negative for environmental allergies, food allergies and immunocompromised state.   Neurological: Negative for dizziness, facial asymmetry, weakness, light-headedness, numbness and headaches.   Hematological: Negative for adenopathy. Does not bruise/bleed easily.   Psychiatric/Behavioral: Negative for agitation, behavioral problems and confusion.     Objective:     Vital Signs (Most Recent):  Temp: 98 °F (36.7 °C) (12/02/20 1550)  Pulse: 63 (12/02/20 1313)  Resp: 16 (12/02/20 1325)  BP: (!) 119/57 (12/02/20 1550)  SpO2: 99 % (12/02/20 1313) Vital Signs (24h Range):  Temp:  [97.6 °F (36.4 °C)-98.3 °F (36.8 °C)] 98 °F (36.7 °C)  Pulse:  [63-84] 63  Resp:  [13-20] 16  SpO2:  [93 %-99 %] 99 %  BP: (106-154)/(52-92) 119/57     Weight: 125.4 kg (276 lb 7.3 oz)  Body mass index is 39.67 kg/m².    Estimated Creatinine Clearance: 73.3 mL/min (based on SCr of 1 mg/dL).    Physical Exam  Constitutional:       General: She is not in acute distress.     Appearance: She is well-developed. She is not diaphoretic.   HENT:      Head: Normocephalic and atraumatic.   Cardiovascular:      Rate and Rhythm: Normal rate and regular rhythm.      Heart sounds: Normal heart sounds. No murmur. No friction rub. No gallop.    Pulmonary:      Effort: Pulmonary effort is normal. No respiratory distress.      Breath sounds: Normal breath sounds. No wheezing or rales.   Abdominal:      General: Bowel sounds are normal. There is no distension.      Palpations: Abdomen is soft. There is no mass.      Tenderness: There is no abdominal tenderness. There is no guarding or rebound.   Skin:     General: Skin is warm and dry.   Neurological:      Mental Status: She is alert and oriented to person, place, and time.   Psychiatric:         Behavior: Behavior normal.         Significant Labs:   Blood Culture:   Recent Labs   Lab 09/23/20  1433 09/26/20  1452 10/08/20  2344 10/08/20  2345 12/02/20  0020   DAMARIS Gram stain  dale bottle: Gram positive cocci in clusters resembling Staph   Results called to and read back by:Parag Galvan RN 09/24/2020  11:30  Gram stain aer bottle: Gram positive cocci in clusters resembling Staph   Positive results previously called 09/24/2020  13:51  METHICILLIN RESISTANT STAPHYLOCOCCUS AUREUS  ID consult required at [facility] and [facility] locations.  For susceptibility see order #[id number]  * No growth after 5 days. No growth after 5 days. No growth after 5 days. No Growth to date     CBC:   Recent Labs   Lab 12/02/20  0021 12/02/20  0510   WBC 7.94 7.20   HGB 11.0* 10.7*   HCT 37.6 36.8*   * 307     CMP:   Recent Labs   Lab 12/01/20  0547 12/02/20  0021 12/02/20  0510    137 138   K 3.8 3.9 3.6    98 100   CO2 30* 30* 28   GLU 93 88 71   BUN 13 15 15   CREATININE 1.0 1.1 1.0   CALCIUM 9.9 10.3 10.1   PROT  --  6.8  --    ALBUMIN  --  2.8*  --    BILITOT  --  0.3  --    ALKPHOS  --  117  --    AST  --  12  --    ALT  --  10  --    ANIONGAP 9 9 10   EGFRNONAA 56* 50.3* 56.4*     Wound Culture:   Recent Labs   Lab 10/16/20  1228   LABAERO No growth     All pertinent labs within the past 24 hours have been reviewed.    Significant Imaging: I have reviewed all pertinent imaging results/findings within the past 24 hours.   MRI SPINE CERVICAL-THORACIC-LUMBAR W W/O CONTRAST (XPD) [[id number]] (Abnormal) Resulted: 12/02/20 0626   Order Status: Completed Updated: 12/02/20 0628   Narrative:     EXAMINATION:   MRI SPINE CERVICAL-THORACIC-LUMBAR W W/O CONTRAST (XPD)     CLINICAL HISTORY:   worsening osteo;     TECHNIQUE:   Multiplanar, multisequence MR images of the cervical, thoracic and lumbar spine were performed before and after the administration of 10 cc gadolinium based IV contrast.     COMPARISON:   *CT thoracic spine 11/30/2020 10/13/2020   *MRI thoracic spine 11/09/2020, 10/12/2020   *CT lumbar spine 10/13/2020   *MRI lumbar spine 10/12/2020     FINDINGS:   Cervical spine:      Please note image quality is degraded by patient motion artifact, most notably sagittal postcontrast images.  Cervical vertebral bodies demonstrate no evidence to suggest acute fracture or abnormal infiltrating marrow replacement process.  There is multilevel intervertebral disc desiccation and disc height loss most pronounced at the C5-C6 level.  The craniocervical junction is within normal limits.  The cervical spinal cord is normal in caliber and signal intensity.  There is multilevel degenerative change of the cervical spine consisting of uncovertebral joint DJD, posterior disc osteophyte complexes and facet arthropathy.  Degenerative change most pronounced at the C5-C6 level where there is effacement of the anterior thecal sac and moderate/severe bilateral neural foraminal narrowing (right greater than left).  Following the administration of IV contrast, no pathologic foci of enhancement are appreciated.  Incidentally visualized soft tissue structures demonstrate a presumed sebaceous cyst in the right posterior extra thoracic soft tissues.     Thoracic:     There is redemonstration of abnormal marrow signal intensity and postcontrast enhancement involving the T9-T10 level consistent with patient's known osteomyelitis/discitis.  There is abnormal fluid signal and peripheral enhancement involving the T9-T10 intervertebral disc space concerning for associated disc space abscess.  There is abnormal signal intensity and postcontrast enhancement involving the anterior and posterior epidural spaces from the T8 through T10 level in keeping with associated epidural phlegmon.  There is associated mass effect on the thoracic spinal cord without definite abnormal intramedullary cord signal intensity.  Phlegmonous change appears to involve the T8-T9 and T9-T10 neural foramina.  There is associated paravertebral phlegmonous change also at these levels.  There is subtle T2/STIR signal hyperintensity involving the T3-T4  intervertebral disc space noting this appears increased in conspicuity from prior examination.  This could reflect an additional region of discitis/osteomyelitis and attention on follow-up exam is advised.  Thoracic vertebral body alignment is stable and there is redemonstration of multilevel degenerative change.  Incidentally visualized intrathoracic structures demonstrate a moderate-sized hiatal hernia and left pleural fluid.     Lumbar spine:     There is grade 2 anterolisthesis of L5 on S1 secondary to bilateral L5 pars defects, unchanged from prior examination.  There is grade 1 anterolisthesis of L3 on L4, also unchanged.  Lumbar vertebral bodies demonstrate no evidence of acute fracture or infiltrating marrow replacement process.  There is multilevel intervertebral disc desiccation.  The conus medullaris is normal in morphology and terminates at the T12-L1 level.  Following the administration of IV contrast, no pathologic foci of enhancement are appreciated.  There is multilevel degenerative change of the lumbar spine similar to most recent MRI of 10/12/2020.  There is a subcentimeter right renal cortical T2 hyperintensity, likely a cyst.  There is fatty atrophy of the paraspinal musculature.    Impression:       1. Findings in keeping with known T9-T10 and osteomyelitis/discitis with associated intervertebral disc space abscess, epidural phlegmon with associated mass effect on the thoracic spinal cord and paravertebral phlegmon as discussed above.  Findings do not appear significantly improved when compared to most recent exam of 11/09/2020.   2. Subtle T2/STIR signal hyperintensity involving the T3-T4 intervertebral disc space, increased in conspicuity from prior examination. This could reflect an additional region of discitis/osteomyelitis and attention on follow-up exam is advised.   3. No evidence to suggest osteomyelitis/discitis in the cervical or lumbar spine at this time.  Multilevel degenerative  change of the cervical and lumbar spine.   4. Moderate size hiatal hernia and left pleural effusion.   This report was flagged in Epic as abnormal.     Electronically signed by resident: Leif Glover   Date: 12/02/2020   Time: 04:58     Electronically signed by: Christy Mcgee MD   Date: 12/02/2020   Time: 06:26   Imaging History    2020  Date Procedure Name Status Accession Number Location   12/02/20 04:03 AM MRI SPINE CERVICAL-THORACIC-LUMBAR W W/O CONTRAST (XPD) Final [id number]   11/30/20 02:10 PM CT Thoracic Spine Without Contrast Final [id number] [facility]   11/09/20 12:08 PM MRI Thoracic Spine W WO Cont Final [id number] [facility]

## 2020-12-02 NOTE — PT/OT/SLP DISCHARGE
Physical Therapy Discharge Summary    Name: Martina Betancourt  MRN: [id number]   Principal Problem: Debility     Patient Discharged from acute Physical Therapy on 20.  Please refer to prior PT noted date on 20 for functional status.     Assessment:     Patient was discharged unexpectedly.  Information required to complete an accurate discharge summary is unknown.  Refer to therapy initial evaluation and last progress note for initial and most recent functional status and goal achievement.  Recommendations made may be found in medical record.    Objective:     GOALS:   Multidisciplinary Problems     Physical Therapy Goals        Problem: Physical Therapy Goal    Goal Priority Disciplines Outcome Goal Variances Interventions   Physical Therapy Goal     PT, PT/OT Ongoing, Progressing     Description: Goals to be met by: 11/10/20    Patient will increase functional independence with mobility by performin. Rolling to sides using bed rail with contact guard assistance and cues  2. Supine to sit with minimal assistance and cues  3. Sit to supine with moderate assistance and cues  4. Tolerate Static Sit at side of the bed for 10 minutes with Standby Assistance  5. Bed to chair transfer with moderate assistance and cues using stand step TECHNIQUE  6. Lower extremity exercise program x15 reps per handout, with assistance as needed                      Reasons for Discontinuation of Therapy Services  Transfer to alternate level of care.      Plan:     Patient Discharged to: [facility] for higher level of care..    Edgar Lamas, PT  2020

## 2020-12-02 NOTE — SUBJECTIVE & OBJECTIVE
Interval History: Patient admitted due to progression of bony erosion at T9-10 from osteomyelitis, now with bilateral T9 pedicle fractures. NAEON. AFVSS. Patient reports mid-back pain, progressively worsening, radiating around to right side. Reports her leg strength has been improving. Denies new weakness, numbness, and b/b dysfunction. Plan for 2 level corpectomy and fusion, tentatively scheduled for 12/8. HM consulted for preop clearance. Continue current course of Vanc for prior MRSA infection, ID consulted for recs.    Medications:  Continuous Infusions:  Scheduled Meds:   donepeziL  10 mg Oral QHS    DULoxetine  60 mg Oral Daily    memantine  10 mg Oral BID    methocarbamoL  500 mg Oral QID    metoprolol succinate  50 mg Oral Daily    mupirocin   Nasal BID    pantoprazole  40 mg Oral Before breakfast    pravastatin  40 mg Oral QHS    vancomycin (VANCOCIN) IVPB  1,000 mg Intravenous Q24H     PRN Meds:acetaminophen, albuterol-ipratropium, dextrose 50%, dextrose 50%, glucagon (human recombinant), glucose, glucose, glucose, HYDROcodone-acetaminophen, insulin aspart U-100, ondansetron     Review of Systems   Constitutional: Negative for chills and fever.   HENT: Negative for rhinorrhea and trouble swallowing.    Eyes: Negative for photophobia and visual disturbance.   Respiratory: Negative for cough and shortness of breath.    Cardiovascular: Negative for chest pain and palpitations.   Gastrointestinal: Negative for nausea and vomiting.   Genitourinary: Negative for difficulty urinating and dysuria.   Musculoskeletal: Positive for back pain. Negative for neck pain.   Skin: Negative for rash and wound.   Neurological: Negative for weakness, numbness and headaches.   Hematological: Negative for adenopathy. Does not bruise/bleed easily.   Psychiatric/Behavioral: Negative for behavioral problems and confusion.     Objective:     Weight: 125.4 kg (276 lb 7.3 oz)  Body mass index is 39.67 kg/m².  Vital Signs  (Most Recent):  Temp: 98.1 °F (36.7 °C) (12/02/20 1313)  Pulse: 63 (12/02/20 1313)  Resp: 16 (12/02/20 1325)  BP: 134/80 (12/02/20 1313)  SpO2: 99 % (12/02/20 1313) Vital Signs (24h Range):  Temp:  [97.6 °F (36.4 °C)-98.5 °F (36.9 °C)] 98.1 °F (36.7 °C)  Pulse:  [63-84] 63  Resp:  [13-20] 16  SpO2:  [93 %-99 %] 99 %  BP: (106-154)/(52-92) 134/80     Date 12/02/20 0700 - 12/03/20 0659   Shift 0635-5475 4418-3816 9189-9902 24 Hour Total   INTAKE   P.O. 300   300   Shift Total(mL/kg) 300(2.4)   300(2.4)   OUTPUT   Urine(mL/kg/hr)  400  400   Shift Total(mL/kg)  400(3.2)  400(3.2)   Weight (kg) 125.4 125.4 125.4 125.4                   Female External Urinary Catheter 12/01/20 2200 (Active)   Skin no redness;no breakdown 12/02/20 0740   Tolerance no signs/symptoms of discomfort 12/02/20 0740   Suction Continuous suction at 70 mmHg 12/02/20 0740   Date of last wick change 12/01/20 12/02/20 0740   Time of last wick change 2200 12/02/20 0740   Output (mL) 400 mL 12/02/20 1500       Neurosurgery Physical Exam    General: well developed, well nourished, no distress.   Head: normocephalic, atraumatic  Neurologic: Alert and oriented. Thought content appropriate.  GCS: E4 V5 M6; Total: 15  Mental Status: Awake, Alert, Oriented x 4  Language: No aphasia  Speech: No dysarthria  Cranial nerves: face symmetric, tongue midline, CN II-XII grossly intact.   Eyes: pupils equal, round, reactive to light with accomodation, EOMI.   Ears: No drainage.   Pulmonary: normal respirations, no signs of respiratory distress  Abdomen: soft, non-distended, not tender to palpation    Sensory: intact to light touch throughout  Motor Strength: Moves all extremities spontaneously with good tone. No abnormal movements seen. Pain-limited hip flexion with decreased ROM bilaterally.    Strength  Deltoids Triceps Biceps Wrist Extension Wrist Flexion Hand    Upper: R 5/5 5/5 5/5 5/5 5/5 5/5    L 5/5 5/5 5/5 5/5 5/5 5/5     Iliopsoas Quadriceps  Knee  Flexion Tibialis  anterior Gastro- cnemius EHL   Lower: R 3/5 5/5 5/5 5/5 5/5 5/5    L 3+/5 5/5 5/5 5/5 5/5 5/5     Clonus: absent  Vascular: No LE edema.   Skin: Skin is warm, dry and intact.      Significant Labs:  Recent Labs   Lab 12/01/20  0547 12/02/20  0021 12/02/20  0510   GLU 93 88 71    137 138   K 3.8 3.9 3.6    98 100   CO2 30* 30* 28   BUN 13 15 15   CREATININE 1.0 1.1 1.0   CALCIUM 9.9 10.3 10.1     Recent Labs   Lab 12/02/20 0021 12/02/20  0510   WBC 7.94 7.20   HGB 11.0* 10.7*   HCT 37.6 36.8*   * 307     Recent Labs   Lab 12/02/20 0021   INR 0.9   APTT 25.1     Microbiology Results (last 7 days)     Procedure Component Value Units Date/Time    Blood culture [[id number]] Collected: 12/02/20 0020    Order Status: Completed Specimen: Blood Updated: 12/02/20 0715     Blood Culture, Routine No Growth to date        All pertinent labs from the last 24 hours have been reviewed.    Significant Diagnostics:  I have reviewed and interpreted all pertinent imaging results/findings within the past 24 hours.

## 2020-12-02 NOTE — H&P
[facility]  Neuorsurgery  History and Physical     Patient Name: Martina Betancourt  MRN: [id number]  Admission Date: (Not on file)  Attending Physician: Luis E Monteiro MD   Primary Care Physician: Joe Fuller Iii, MD    Patient information was obtained from patient and ER records.     Subjective:     Chief Complaint/Reason for Admission: Osteo     HPI:  70 y.o. female known to [facility] with PMH of Alzheimer's dz, HTN, HLD, CAD, YOVANI, and morbid obesity who was recently hospitalized x2 for MRSA bacteremia complicated by pneumonia and possible UTI, treated with linozolid x8 days and cipro and discharged 10/1. She was then readmitted to [facility] on 10/8 with severe back pain. MRI revealed T9-10 osteodiscitis and T8-10 epidural phlegmon with associated paraverterbral abscesses. Spine infection likely hematogenous from under-treated bacteremia. Patient had no neurologic deficits on exam, no indications for emergent neurosurgical intervention, plan was made for conservative non-surgical treatment. She underwent needle aspiration of T9-10 disc space on 10/16, cultures were negative although she had already been undergoing antibiotic treatment. Repeat BCx remained no growth and she remained afebrile and neurologically stable. Patient was discharged to [facility] on 10/20 with plan for Vancomycin IV x 8 weeks (estimated end date 12/3).    Patient presents today after interval imaging obtained 11/30 revealed worsening bony destruction, unstable T9 fracture. Transferred for [facility] eval. Denies new b/b symptoms. NO saddle anesthesia    Medications Prior to Admission   Medication Sig Dispense Refill Last Dose    acetaminophen (TYLENOL) 325 MG tablet Take 2 tablets (650 mg total) by mouth every 6 (six) hours as needed (HEADACHE, TEMPERATURE - FEVER > 100.4).  0     albuterol-ipratropium (DUO-NEB) 2.5 mg-0.5 mg/3 mL nebulizer solution Take 3 mLs by nebulization every 6 (six) hours as needed for Wheezing or  Shortness of Breath. Rescue 1 Box 0     aspirin (ECOTRIN) 81 MG EC tablet Take 81 mg by mouth once daily.       bisacodyL (DULCOLAX) 5 mg EC tablet Take 5 mg by mouth daily as needed for Constipation.       clopidogreL (PLAVIX) 75 mg tablet Take 1 tablet (75 mg total) by mouth once daily. 30 tablet 11     donepeziL (ARICEPT) 10 MG tablet Take 10 mg by mouth every evening.       DULoxetine (CYMBALTA) 60 MG capsule Take 60 mg by mouth once daily.       ferrous sulfate 324 mg (65 mg iron) TbEC Take 325 mg by mouth every evening.       furosemide (LASIX) 40 MG tablet Take 1 tablet (40 mg total) by mouth once daily. 30 tablet 11     guaiFENesin (MUCINEX) 600 mg 12 hr tablet Take 1,200 mg by mouth 2 (two) times daily.       HYDROcodone-acetaminophen (NORCO)  mg per tablet Take 1 tablet by mouth every 6 (six) hours as needed for Pain.       isosorbide mononitrate (IMDUR) 60 MG 24 hr tablet Take 60 mg by mouth once daily.       methocarbamoL (ROBAXIN) 500 MG Tab Take 500 mg by mouth 4 (four) times daily.       metoprolol succinate (TOPROL-XL) 50 MG 24 hr tablet Take 50 mg by mouth once daily.       miconazole nitrate 2% (MICOTIN) 2 % Oint Apply topically 2 (two) times daily.  0     pantoprazole (PROTONIX) 40 MG tablet Take 1 tablet (40 mg total) by mouth before breakfast. 30 tablet 11     pravastatin (PRAVACHOL) 40 MG tablet Take 40 mg by mouth every evening.       [DISCONTINUED] lisinopriL (PRINIVIL,ZESTRIL) 2.5 MG tablet Take 1 tablet (2.5 mg total) by mouth once daily. 90 tablet 3     [DISCONTINUED] memantine (NAMENDA XR) 28 mg CSpX Take 28 mg by mouth once daily.          Review of patient's allergies indicates:   Allergen Reactions    Hydroxyzine hcl     Tizanidine        Past Medical History:   Diagnosis Date    Alzheimer disease     Alzheimer disease     Coronary artery disease     Hyperlipidemia     Hypertension     Myopathy     Non-ST elevation (NSTEMI) myocardial infarction      Obesity     Pneumonia     Sleep apnea      Past Surgical History:   Procedure Laterality Date    APPENDECTOMY      APPENDECTOMY      CHOLECYSTECTOMY      CORONARY STENT PLACEMENT      HYSTERECTOMY      TONSILLECTOMY       Family History     None        Tobacco Use    Smoking status: Unknown If Ever Smoked   Substance and Sexual Activity    Alcohol use: Not Currently    Drug use: Never    Sexual activity: Not Currently     Review of Systems   Constitutional: Negative for chills and fever.   HENT: Negative for trouble swallowing and voice change.    Eyes: Negative for photophobia and visual disturbance.   Respiratory: Negative for chest tightness and shortness of breath.    Cardiovascular: Negative for chest pain and palpitations.   Gastrointestinal: Positive for constipation. Negative for nausea and vomiting.   Genitourinary: Negative for difficulty urinating.   Musculoskeletal: Positive for back pain. Negative for neck pain.   Neurological: Negative for weakness and numbness.     Objective:        There is no height or weight on file to calculate BMI.  Vital Signs (Most Recent):  Temp: 98.3 °F (36.8 °C) (12/01/20 2221)  Pulse: 71 (12/01/20 2221)  Resp: 20 (12/01/20 2221)  BP: (!) 115/56 (12/01/20 2221)  SpO2: (!) 93 % (12/01/20 2221) Vital Signs (24h Range):  Temp:  [97.6 °F (36.4 °C)-98.5 °F (36.9 °C)] 98.3 °F (36.8 °C)  Pulse:  [70-73] 71  Resp:  [18-20] 20  SpO2:  [93 %-96 %] 93 %  BP: (106-121)/(52-60) 115/56                     Female External Urinary Catheter 11/13/20 1000 (Active)   Skin no redness;no breakdown 12/01/20 0705   Tolerance no signs/symptoms of discomfort 12/01/20 0705   Suction Continuous suction at 70 mmHg 12/01/20 0705   Date of last wick change 12/01/20 12/01/20 2044   Time of last wick change 0852 12/01/20 0705   Output (mL) 1100 mL 12/01/20 2000       Neurosurgery Physical Exam   Awake, alert, NAD  PERRL  EOMI  RICHTER antigravity, symmetrically  FS in BUE  FS in LLE  Pain limited  weakness RLE (baseline per patient from prior injury), able to move limb antigravity 4/5  SILT    Significant Labs:  Recent Labs   Lab 11/30/20  0527 11/30/20  0528 12/01/20  0547   GLU 95  --  93     --  140   K 3.8  --  3.8   CL 98  --  101   CO2 28  --  30*   BUN 15  --  13   CREATININE 1.1  --  1.0   CALCIUM 10.0  --  9.9   MG  --  2.2  --      Recent Labs   Lab 11/30/20 0527   WBC 6.92   HGB 10.7*   HCT 35.4*        No results for input(s): LABPT, INR, APTT in the last 48 hours.  Microbiology Results (last 7 days)     Procedure Component Value Units Date/Time    Blood culture [[id number]]     Order Status: Sent Specimen: Blood           Significant Diagnostics:  No results found in the last 24 hours.      Assessment and Plan:     MRSA bacteremia  70 F with known MRSA bacteremia with thoracic osteomyelitis presenting as transfer from SNF after CT T spine concerning for b/l T9 pedicle fractures:    Admit NSGY  q4 neurochecks  Preop labs pending - CBC, CMP, T&S, Coags, inflammatory markers  MRI C/T/L W/WO  ID, continue Vancomycin  Hold ASA/Plavix/Lovenox  TLSO ordered from DME  LOG ROLL, STRICT SPINE PRECAUTIONS, HOB FLAT  NPO after MN  Continue home meds  Pain control  Page w/exam change    D/w Dr Thania Gillette MD  Neurosurgery  [facility]

## 2020-12-02 NOTE — TELEPHONE ENCOUNTER
----- Message from Alexa Lilly MA sent at 12/2/2020 10:26 AM CST -----    ----- Message -----  From: Taina Holliday  Sent: 12/2/2020  10:02 AM CST  To: [facility] Staff    Caller:  Sharon / Daughter  tel: [phone]   /    Pt. Was admitted last night at the [facility] .   Her room is 8086 .    Admitted due to back problems.    Asking if the dr. For ID can see her while she is in the hospital.

## 2020-12-02 NOTE — TELEPHONE ENCOUNTER
Returned patient daughter's call. There was no answer. Unable to leave a voicemail. The phone just continued to ring.

## 2020-12-03 ENCOUNTER — TELEPHONE (OUTPATIENT)
Dept: NEUROSURGERY | Facility: CLINIC | Age: 70
End: 2020-12-03

## 2020-12-03 LAB
ANION GAP SERPL CALC-SCNC: 9 MMOL/L (ref 8–16)
BASOPHILS # BLD AUTO: 0.03 K/UL (ref 0–0.2)
BASOPHILS NFR BLD: 0.4 % (ref 0–1.9)
BUN SERPL-MCNC: 12 MG/DL (ref 8–23)
CALCIUM SERPL-MCNC: 10 MG/DL (ref 8.7–10.5)
CHLORIDE SERPL-SCNC: 101 MMOL/L (ref 95–110)
CO2 SERPL-SCNC: 30 MMOL/L (ref 23–29)
CREAT SERPL-MCNC: 1 MG/DL (ref 0.5–1.4)
DIFFERENTIAL METHOD: ABNORMAL
EOSINOPHIL # BLD AUTO: 0.3 K/UL (ref 0–0.5)
EOSINOPHIL NFR BLD: 3.6 % (ref 0–8)
ERYTHROCYTE [DISTWIDTH] IN BLOOD BY AUTOMATED COUNT: 16.7 % (ref 11.5–14.5)
EST. GFR  (AFRICAN AMERICAN): >60 ML/MIN/1.73 M^2
EST. GFR  (NON AFRICAN AMERICAN): 56.4 ML/MIN/1.73 M^2
GLUCOSE SERPL-MCNC: 78 MG/DL (ref 70–110)
HCT VFR BLD AUTO: 35.1 % (ref 37–48.5)
HGB BLD-MCNC: 10.3 G/DL (ref 12–16)
IMM GRANULOCYTES # BLD AUTO: 0.02 K/UL (ref 0–0.04)
IMM GRANULOCYTES NFR BLD AUTO: 0.3 % (ref 0–0.5)
LYMPHOCYTES # BLD AUTO: 2.2 K/UL (ref 1–4.8)
LYMPHOCYTES NFR BLD: 31.6 % (ref 18–48)
MCH RBC QN AUTO: 27.4 PG (ref 27–31)
MCHC RBC AUTO-ENTMCNC: 29.3 G/DL (ref 32–36)
MCV RBC AUTO: 93 FL (ref 82–98)
MONOCYTES # BLD AUTO: 0.9 K/UL (ref 0.3–1)
MONOCYTES NFR BLD: 13.3 % (ref 4–15)
NEUTROPHILS # BLD AUTO: 3.5 K/UL (ref 1.8–7.7)
NEUTROPHILS NFR BLD: 50.8 % (ref 38–73)
NRBC BLD-RTO: 0 /100 WBC
PLATELET # BLD AUTO: 329 K/UL (ref 150–350)
PMV BLD AUTO: 9.5 FL (ref 9.2–12.9)
POTASSIUM SERPL-SCNC: 3.6 MMOL/L (ref 3.5–5.1)
RBC # BLD AUTO: 3.76 M/UL (ref 4–5.4)
SODIUM SERPL-SCNC: 140 MMOL/L (ref 136–145)
WBC # BLD AUTO: 6.9 K/UL (ref 3.9–12.7)

## 2020-12-03 PROCEDURE — 80048 BASIC METABOLIC PNL TOTAL CA: CPT

## 2020-12-03 PROCEDURE — 99024 PR POST-OP FOLLOW-UP VISIT: ICD-10-PCS | Mod: POP,,, | Performed by: PHYSICIAN ASSISTANT

## 2020-12-03 PROCEDURE — 20600001 HC STEP DOWN PRIVATE ROOM

## 2020-12-03 PROCEDURE — 99024 POSTOP FOLLOW-UP VISIT: CPT | Mod: POP,,, | Performed by: PHYSICIAN ASSISTANT

## 2020-12-03 PROCEDURE — 25000003 PHARM REV CODE 250: Performed by: STUDENT IN AN ORGANIZED HEALTH CARE EDUCATION/TRAINING PROGRAM

## 2020-12-03 PROCEDURE — 36415 COLL VENOUS BLD VENIPUNCTURE: CPT

## 2020-12-03 PROCEDURE — 63600175 PHARM REV CODE 636 W HCPCS: Performed by: PHYSICIAN ASSISTANT

## 2020-12-03 PROCEDURE — 25000003 PHARM REV CODE 250: Performed by: PHYSICIAN ASSISTANT

## 2020-12-03 PROCEDURE — 63600175 PHARM REV CODE 636 W HCPCS: Mod: JG | Performed by: PHYSICIAN ASSISTANT

## 2020-12-03 PROCEDURE — 85025 COMPLETE CBC W/AUTO DIFF WBC: CPT

## 2020-12-03 RX ADMIN — PRAVASTATIN SODIUM 40 MG: 40 TABLET ORAL at 08:12

## 2020-12-03 RX ADMIN — MEMANTINE HYDROCHLORIDE 10 MG: 10 TABLET ORAL at 10:12

## 2020-12-03 RX ADMIN — PANTOPRAZOLE SODIUM 40 MG: 40 TABLET, DELAYED RELEASE ORAL at 05:12

## 2020-12-03 RX ADMIN — ACETAMINOPHEN 1000 MG: 500 TABLET ORAL at 05:12

## 2020-12-03 RX ADMIN — DONEPEZIL HYDROCHLORIDE 10 MG: 10 TABLET ORAL at 08:12

## 2020-12-03 RX ADMIN — METHOCARBAMOL 500 MG: 500 TABLET ORAL at 12:12

## 2020-12-03 RX ADMIN — OXYCODONE HYDROCHLORIDE 5 MG: 5 TABLET ORAL at 08:12

## 2020-12-03 RX ADMIN — MUPIROCIN: 20 OINTMENT TOPICAL at 10:12

## 2020-12-03 RX ADMIN — OXYCODONE HYDROCHLORIDE 5 MG: 5 TABLET ORAL at 10:12

## 2020-12-03 RX ADMIN — HEPARIN SODIUM 5000 UNITS: 5000 INJECTION INTRAVENOUS; SUBCUTANEOUS at 02:12

## 2020-12-03 RX ADMIN — MUPIROCIN: 20 OINTMENT TOPICAL at 08:12

## 2020-12-03 RX ADMIN — SENNOSIDES AND DOCUSATE SODIUM 1 TABLET: 8.6; 5 TABLET ORAL at 08:12

## 2020-12-03 RX ADMIN — ACETAMINOPHEN 1000 MG: 500 TABLET ORAL at 09:12

## 2020-12-03 RX ADMIN — METOPROLOL SUCCINATE 50 MG: 50 TABLET, EXTENDED RELEASE ORAL at 10:12

## 2020-12-03 RX ADMIN — CEFTAROLINE FOSAMIL 600 MG: 600 POWDER, FOR SOLUTION INTRAVENOUS at 02:12

## 2020-12-03 RX ADMIN — CEFTAROLINE FOSAMIL 600 MG: 600 POWDER, FOR SOLUTION INTRAVENOUS at 06:12

## 2020-12-03 RX ADMIN — ACETAMINOPHEN 1000 MG: 500 TABLET ORAL at 02:12

## 2020-12-03 RX ADMIN — MEMANTINE HYDROCHLORIDE 10 MG: 10 TABLET ORAL at 08:12

## 2020-12-03 RX ADMIN — METHOCARBAMOL 500 MG: 500 TABLET ORAL at 04:12

## 2020-12-03 RX ADMIN — HEPARIN SODIUM 5000 UNITS: 5000 INJECTION INTRAVENOUS; SUBCUTANEOUS at 08:12

## 2020-12-03 RX ADMIN — METHOCARBAMOL 500 MG: 500 TABLET ORAL at 10:12

## 2020-12-03 RX ADMIN — OXYCODONE HYDROCHLORIDE 10 MG: 10 TABLET ORAL at 03:12

## 2020-12-03 RX ADMIN — SENNOSIDES AND DOCUSATE SODIUM 1 TABLET: 8.6; 5 TABLET ORAL at 10:12

## 2020-12-03 RX ADMIN — CEFTAROLINE FOSAMIL 600 MG: 600 POWDER, FOR SOLUTION INTRAVENOUS at 09:12

## 2020-12-03 RX ADMIN — DULOXETINE HYDROCHLORIDE 60 MG: 60 CAPSULE, DELAYED RELEASE ORAL at 10:12

## 2020-12-03 RX ADMIN — METHOCARBAMOL 500 MG: 500 TABLET ORAL at 08:12

## 2020-12-03 RX ADMIN — HEPARIN SODIUM 5000 UNITS: 5000 INJECTION INTRAVENOUS; SUBCUTANEOUS at 05:12

## 2020-12-03 NOTE — ASSESSMENT & PLAN NOTE
69yo female with Hx of MRSA bacteremia completing 6-8 weeks IV vanc not with pedicular fracture and worsening infection at T8-10 with epidural phlegmon/abscess on vanc  Afebrile and WBC WNL.  Stable non septic   BCXs NGTD  NSGY planning intervention ASAP    Plan:  1. DC vanc  2. Expand coverage to ceftaroline  3. Follow BCXs  4. TO OR ASAP  5. Will follow.

## 2020-12-03 NOTE — PLAN OF CARE
Problem: Adult Inpatient Plan of Care  Goal: Plan of Care Review  Outcome: Ongoing, Progressing  Goal: Optimal Comfort and Wellbeing  Outcome: Ongoing, Progressing  Intervention: Provide Person-Centered Care  Flowsheets (Taken 12/2/2020 1926)  Trust Relationship/Rapport:   care explained   emotional support provided   empathic listening provided   questions answered   thoughts/feelings acknowledged   reassurance provided     Problem: Infection  Goal: Infection Symptom Resolution  Outcome: Ongoing, Progressing  Intervention: Prevent or Manage Infection  Flowsheets (Taken 12/2/2020 1926)  Fever Reduction/Comfort Measures: medication administered  Infection Management: aseptic technique maintained     Problem: Skin Injury Risk Increased  Goal: Skin Health and Integrity  Outcome: Ongoing, Progressing  Intervention: Optimize Skin Protection  Flowsheets (Taken 12/2/2020 1926)  Pressure Reduction Techniques: weight shift assistance provided  Pressure Reduction Devices:   foam padding utilized   pressure-redistributing mattress utilized  Skin Protection:   adhesive use limited   incontinence pads utilized  Head of Bed (HOB): HOB flat

## 2020-12-03 NOTE — PLAN OF CARE
Problem: Adult Inpatient Plan of Care  Goal: Absence of Hospital-Acquired Illness or Injury  12/3/2020 1540 by Cassie Baron LPN  Outcome: Ongoing, Progressing  12/3/2020 1540 by Cassie Baron LPN  Outcome: Ongoing, Progressing  Intervention: Identify and Manage Fall Risk  Flowsheets (Taken 12/3/2020 1540)  Safety Promotion/Fall Prevention:   assistive device/personal item within reach   bed alarm set   lighting adjusted   medications reviewed   side rails raised x 2  Plan of care was reviewed with the pt. AAOx4. Vitals were stable. Pain management was reviewed with the pt. PRN pain meds were given. CT scan of the spine was done. Pt is still on 2L NC. No major changes through out the day. Safety precautions were in placed.

## 2020-12-03 NOTE — PROGRESS NOTES
[facility]  Neurosurgery  Progress Note    Subjective:     History of Present Illness: 70 y.o. female known to [facility] with PMH of Alzheimer's dz, HTN, HLD, CAD, YOVANI, and morbid obesity who was recently hospitalized x2 for MRSA bacteremia complicated by pneumonia and possible UTI, treated with linozolid x8 days and cipro and discharged 10/1. She was then readmitted to [facility] on 10/8 with severe back pain. MRI revealed T9-10 osteodiscitis and T8-10 epidural phlegmon with associated paraverterbral abscesses. Spine infection likely hematogenous from under-treated bacteremia. Patient had no neurologic deficits on exam, no indications for emergent neurosurgical intervention, plan was made for conservative non-surgical treatment. She underwent needle aspiration of T9-10 disc space on 10/16, cultures were negative although she had already been undergoing antibiotic treatment. Repeat BCx remained no growth and she remained afebrile and neurologically stable. Patient was discharged to [facility] on 10/20 with plan for Vancomycin IV x 8 weeks (estimated end date 12/3).    Patient presents today after interval imaging obtained 11/30 revealed worsening bony destruction, unstable T9 fracture. Transferred for [facility].     Post-Op Info:  Procedure(s) (LRB):  T9-T10 corpectomy, posterior instrumented fusion T7-T12. Aero. Globus, Depuy. Neuromonitoring. (N/A)         Interval History: NAEON. Patient compliant with spinal precautions, HOB < 30. No changes on exam. Will obtain CT cervical spine to further assess prevertebral edema at C5-6 on MRI.  Patient denies neck pain or radicular upper extremity pain. Voiding spontaneously. Plan for OR Tuesday.    Medications:  Continuous Infusions:  Scheduled Meds:   acetaminophen  1,000 mg Oral Q8H    ceftaroline fosamil  600 mg Intravenous Q8H    donepeziL  10 mg Oral QHS    DULoxetine  60 mg Oral Daily    heparin (porcine)  5,000 Units Subcutaneous Q8H     memantine  10 mg Oral BID    methocarbamoL  500 mg Oral QID    metoprolol succinate  50 mg Oral Daily    mupirocin   Nasal BID    pantoprazole  40 mg Oral Before breakfast    pravastatin  40 mg Oral QHS    senna-docusate 8.6-50 mg  1 tablet Oral BID     PRN Meds:acetaminophen, albuterol-ipratropium, dextrose 50%, dextrose 50%, glucagon (human recombinant), glucose, glucose, glucose, insulin aspart U-100, ondansetron, oxyCODONE, oxyCODONE     Objective:     Weight: 125.4 kg (276 lb 7.3 oz)  Body mass index is 39.67 kg/m².  Vital Signs (Most Recent):  Temp: 97.8 °F (36.6 °C) (12/03/20 1100)  Pulse: 74 (12/03/20 0748)  Resp: 18 (12/03/20 1014)  BP: 133/60 (12/03/20 0748)  SpO2: 99 % (12/03/20 0748) Vital Signs (24h Range):  Temp:  [97.8 °F (36.6 °C)-98.4 °F (36.9 °C)] 97.8 °F (36.6 °C)  Pulse:  [65-74] 74  Resp:  [16-18] 18  SpO2:  [92 %-99 %] 99 %  BP: (119-138)/(57-81) 133/60     Date 12/03/20 0700 - 12/04/20 0659   Shift 8098-6922 3420-3372 5870-2254 24 Hour Total   INTAKE   P.O. 360   360   Shift Total(mL/kg) 360(2.9)   360(2.9)   OUTPUT   Urine(mL/kg/hr) 700(0.7)   700   Stool 0   0   Shift Total(mL/kg) 700(5.6)   700(5.6)   Weight (kg) 125.4 125.4 125.4 125.4                   Female External Urinary Catheter 12/01/20 2200 (Active)   Skin no redness;no breakdown 12/03/20 0800   Tolerance no signs/symptoms of discomfort 12/03/20 0800   Suction Continuous suction at 70 mmHg 12/03/20 0800   Date of last wick change 12/01/20 12/02/20 0740   Time of last wick change 2200 12/02/20 0740   Output (mL) 100 mL 12/03/20 1405       Neurosurgery Physical Exam    General: well developed, well nourished, no distress.   Head: normocephalic, atraumatic  Neck: No tracheal deviation. No palpable masses. Full ROM.   Neurologic: Alert and oriented. Thought content appropriate.  GCS: Motor: 6/Verbal: 5/Eyes: 4 GCS Total: 15  Mental Status: Awake, Alert, Oriented x 4  Language: No aphasia  Speech: No dysarthria  Cranial nerves:  face symmetric, tongue midline, CN II-XII grossly intact.   Eyes: pupils equal, round, reactive to light with accomodation, EOMI.  Ears: No drainage.   Pulmonary: normal respirations, no signs of respiratory distress  Abdomen: soft, non-distended, not tender to palpation  Sensory: intact to light touch throughout  Motor Strength: Moves all extremities spontaneously with good tone.  Pain limited weakness in proximal BLE limiting exam, limited also by body habitus. No abnormal movements seen.     Strength  Deltoids Triceps Biceps Wrist Extension Wrist Flexion Hand    Upper: R 5/5 5/5 5/5 5/5 5/5 5/5    L 5/5 5/5 5/5 5/5 5/5 5/5     Iliopsoas Quadriceps Knee  Flexion Tibialis  anterior Gastro- cnemius EHL   Lower: R 3/5 3/5 3/5 5/5 5/5 5/5    L 4-/5 4-/5 4-/5 5/5 5/5 5/5     Stein: absent  Clonus: absent  DTR: 2+ and symmetric in brachioradialis, biceps, patellar  Vascular: No LE edema.   Skin: Skin is warm, dry and intact.      Significant Labs:  Recent Labs   Lab 12/02/20 0021 12/02/20  0510 12/03/20  0249   GLU 88 71 78    138 140   K 3.9 3.6 3.6   CL 98 100 101   CO2 30* 28 30*   BUN 15 15 12   CREATININE 1.1 1.0 1.0   CALCIUM 10.3 10.1 10.0     Recent Labs   Lab 12/02/20 0021 12/02/20  0510 12/03/20  0250   WBC 7.94 7.20 6.90   HGB 11.0* 10.7* 10.3*   HCT 37.6 36.8* 35.1*   * 307 329     Recent Labs   Lab 12/02/20 0021   INR 0.9   APTT 25.1     Microbiology Results (last 7 days)     Procedure Component Value Units Date/Time    Blood culture [[id number]] Collected: 12/02/20 0020    Order Status: Completed Specimen: Blood Updated: 12/03/20 0613     Blood Culture, Routine No Growth to date      No Growth to date        Recent Lab Results       12/03/20 0250 12/03/20 0249        Anion Gap   9     Baso # 0.03       Basophil % 0.4       BUN   12     Calcium   10.0     Chloride   101     CO2   30     Creatinine   1.0     Differential Method Automated       eGFR if    >60.0      eGFR if non    56.4  Comment:  Calculation used to obtain the estimated glomerular filtration  rate (eGFR) is the CKD-EPI equation.        Eos # 0.3       Eosinophil % 3.6       Glucose   78     Gran # (ANC) 3.5       Gran % 50.8       Hematocrit 35.1       Hemoglobin 10.3       Immature Grans (Abs) 0.02  Comment:  Mild elevation in immature granulocytes is non specific and   can be seen in a variety of conditions including stress response,   acute inflammation, trauma and pregnancy. Correlation with other   laboratory and clinical findings is essential.         Immature Granulocytes 0.3       Lymph # 2.2       Lymph % 31.6       MCH 27.4       MCHC 29.3       MCV 93       Mono # 0.9       Mono % 13.3       MPV 9.5       nRBC 0       Platelets 329       Potassium   3.6     RBC 3.76       RDW 16.7       Sodium   140     WBC 6.90           All pertinent labs from the last 24 hours have been reviewed.    Significant Diagnostics:  I have reviewed all pertinent imaging results/findings within the past 24 hours.    Assessment/Plan:     MRSA bacteremia  70 F with known MRSA bacteremia with thoracic osteomyelitis presenting as transfer from SNF after CT T spine concerning for increased erosion of T9/T10 vertebral bodies as well as bilateral T9 pedicle fractures:    - Admitted to [place] [id number] [facility]  - Preop labs reviewed  - MRI C/T/L W/WO shows known T9-T10 osteomyelitis/discitis with intervertebral disc space abscess and epidural phlegmon, causing some mass effect on spinal cord but no cord signal changes. Alignment grossly maintained.  - Plan for stabilization with T9-T10 corpectomy and fusion above/below. Tentatively scheduled for 12/8. Will obtain intraoperative cultures.  - TLSO brace at all times  - Activity: Bed rest with spinal precautions, log roll, HOB < 30 at all times.  - Pain control: scheduled Tylenol and Robaxin, Oxycodone PRN  - Preoperative Clearance: HM consulted for preoperative risk  assessment and clearance.   - RCRI 10.1% risk.  Patient is moderate risk for moderate risk surgery, benefits of surgery outweigh risk, ok to proceed from medicine standpoint.    - Recommend repeat TTE as outpatient given changes from 8/10 - 10/12. Pt may have had NSTEMI. Endocarditis felt less likely.  - H/o MRSA Bacteremia: Afebrile, no leukocytosis. ESR 97, CRP 12.4.    - ID following, appreciate assistance. Added ceftaroline.   - CAD, s/p Cardiac Stent: MI in 2000 which required stent placements. Pt takes ASA and Plavix, last dose 11/30.   - Hold ASA/Plavix preoperatively  - Alzheimer's: Continue home memantine and donepezil  - HLD: Continue home pravastatin  -DVT prophylaxis: SHAYLA's, SCD's, SQH  -Bowel regimen: senna BID     Contact Neurosurgery with any questions, concerns, or neuro changes.    D/w Dr. Fransisca Metzger, PA-C  Neurosurgery  [facility]

## 2020-12-03 NOTE — CONSULTS
[facility]  Infectious Disease  Consult Note    Patient Name: Martina Betancourt  MRN: [id number]  Admission Date: 12/1/2020  Hospital Length of Stay: 1 days  Attending Physician: Everardo Gongora MD  Primary Care Provider: Joe Fuller Iii, MD     Isolation Status: No active isolations    Patient information was obtained from patient and ER records.      Inpatient consult to Infectious Diseases  Consult performed by: LAYO Hightower Jr.  Consult ordered by: Tania Berry MD        Assessment/Plan:     * Osteomyelitis of thoracic vertebra  69yo female with Hx of MRSA bacteremia completing 6-8 weeks IV vanc not with pedicular fracture and worsening infection at T8-10 with epidural phlegmon/abscess on vanc  Afebrile and WBC WNL.  Stable non septic   BCXs NGTD  NSGY planning intervention ASAP    Plan:  1. DC vanc  2. Expand coverage to ceftaroline  3. Follow BCXs  4. TO OR ASAP  5. Will follow.         Thank you for your consult. I will follow-up with patient. Please contact us if you have any additional questions.    LAYO Kennedy  Infectious Disease  [facility]    Subjective:     Principal Problem: Osteomyelitis of thoracic vertebra    HPI: 69 yo female with a history of MRSA bactermia and dementia known to ID service and under the treatment for presumed MRSA osteodiscitis at T9-10.  She had been seen in Oct 2020 after having been admitted to and OSH and found to have MRSA bacteremia that was treated as inpt and dc'd on Zyvox to complete 7 d.  She then presented with excruciating back pain and found to have osteodiscitis at T9-10 and BL ST abscesses.  Blood cultures were no growth at that time and [facility] underwent aspiration at T9-10 and it was no growth.  She was discharged on Vancomycin to complete 6-8 weeks EOC 12/3/20 and she has been at [facility]  She had followed up in ID clinic on 11/18 and back pain was improving. Repeat MRI 11/9 was done and  showed:  1. Altered signal intensity changes at T9/T10 with evidence of erosive focus about the opposing endplates and evidence of enhancing phlegmonous formation identified in the central component of the disc with central low signal intensity compatible with osteomyelitis/discitis.  Vertical dimensions of the area of interest cause extreme erosion along the anterior portion of the vertebral bodies of interest without evidence of any significant paraspinal component.  2. No significant soft tissue component.    NSGY fu was arranged.      FU CT scan was planned and done on  showin.Findings compatible with T9-10 discitis/osteomyelitis with surrounding perivertebral soft tissue thickening.  Evaluation for underlying abscess an epidural process limited given the lack of intravenous contrast.  If there is concern for underlying epidural process, consider further evaluation with an MRI of the thoracic spine.  No retropulsion is noted.  2. Interval development of fracture deformity through the bilateral T9 pedicles and posterior superior cortical margin of the T9 vertebral body    MRI C/T/L done showin. Findings in keeping with known T9-T10 and osteomyelitis/discitis with associated intervertebral disc space abscess, epidural phlegmon with associated mass effect on the thoracic spinal cord and paravertebral phlegmon as discussed above.  Findings do not appear significantly improved when compared to most recent exam of 2020.   2. Subtle T2/STIR signal hyperintensity involving the T3-T4 intervertebral disc space, increased in conspicuity from prior examination. This could reflect an additional region of discitis/osteomyelitis and attention on follow-up exam is advised.   3. No evidence to suggest osteomyelitis/discitis in the cervical or lumbar spine at this time.  Multilevel degenerative change of the cervical and lumbar spine.   4. Moderate size hiatal hernia and left pleural effusion.     She was  admitted and surgery planned.  Her back pain has worsened over the last 2-3 weeks but she denies systemic sing of infection.  Blood cultures are NGTD and crp 12.4.  She remains on vanc and denies PICC problems.  The patient denies any recent fever, chills, or sweats.        Past Medical History:   Diagnosis Date    Alzheimer disease     Alzheimer disease     Coronary artery disease     Hyperlipidemia     Hypertension     Myopathy     Non-ST elevation (NSTEMI) myocardial infarction     Obesity     Pneumonia     Sleep apnea        Past Surgical History:   Procedure Laterality Date    APPENDECTOMY      APPENDECTOMY      CHOLECYSTECTOMY      CORONARY STENT PLACEMENT      HYSTERECTOMY      TONSILLECTOMY         Review of patient's allergies indicates:   Allergen Reactions    Hydroxyzine hcl     Tizanidine        Medications:  Medications Prior to Admission   Medication Sig    acetaminophen (TYLENOL) 325 MG tablet Take 2 tablets (650 mg total) by mouth every 6 (six) hours as needed (HEADACHE, TEMPERATURE - FEVER > 100.4).    albuterol-ipratropium (DUO-NEB) 2.5 mg-0.5 mg/3 mL nebulizer solution Take 3 mLs by nebulization every 6 (six) hours as needed for Wheezing or Shortness of Breath. Rescue    aluminum & magnesium hydroxide-simethicone (MYLANTA MAX STRENGTH) 400-400-40 mg/5 mL suspension Take 30 mLs by mouth every 6 (six) hours as needed for Indigestion.    aspirin (ECOTRIN) 81 MG EC tablet Take 81 mg by mouth once daily.    bisacodyL (DULCOLAX) 5 mg EC tablet Take 5 mg by mouth daily as needed for Constipation.    calcium carbonate (TUMS) 200 mg calcium (500 mg) chewable tablet Take 1 tablet (500 mg total) by mouth 2 (two) times daily as needed.    clopidogreL (PLAVIX) 75 mg tablet Take 1 tablet (75 mg total) by mouth once daily.    donepeziL (ARICEPT) 10 MG tablet Take 10 mg by mouth every evening.    DULoxetine (CYMBALTA) 60 MG capsule Take 60 mg by mouth once daily.    enoxaparin  (LOVENOX) 40 mg/0.4 mL Syrg Inject 0.4 mLs (40 mg total) into the skin once daily.    ferrous sulfate 324 mg (65 mg iron) TbEC Take 325 mg by mouth every evening.    furosemide (LASIX) 40 MG tablet Take 1 tablet (40 mg total) by mouth once daily.    guaiFENesin (MUCINEX) 600 mg 12 hr tablet Take 1,200 mg by mouth 2 (two) times daily.    HYDROcodone-acetaminophen (NORCO)  mg per tablet Take 1 tablet by mouth every 6 (six) hours as needed for Pain.    isosorbide mononitrate (IMDUR) 60 MG 24 hr tablet Take 60 mg by mouth once daily.    lisinopriL 10 MG tablet Take 1 tablet (10 mg total) by mouth once daily.    melatonin (MELATIN) 3 mg tablet Take 2 tablets (6 mg total) by mouth nightly as needed for Insomnia.    memantine (NAMENDA) 10 MG Tab Take 1 tablet (10 mg total) by mouth 2 (two) times daily.    methocarbamoL (ROBAXIN) 500 MG Tab Take 500 mg by mouth 4 (four) times daily.    metoprolol succinate (TOPROL-XL) 50 MG 24 hr tablet Take 50 mg by mouth once daily.    miconazole nitrate 2% (MICOTIN) 2 % Oint Apply topically 2 (two) times daily.    ondansetron (ZOFRAN-ODT) 4 MG TbDL Take 1 tablet (4 mg total) by mouth every 8 (eight) hours as needed.    pantoprazole (PROTONIX) 40 MG tablet Take 1 tablet (40 mg total) by mouth before breakfast.    pravastatin (PRAVACHOL) 40 MG tablet Take 40 mg by mouth every evening.    sodium chloride 0.9% (NORMAL SALINE FLUSH) injection Inject 10 mLs into the vein as needed.    vancomycin HCl in 5 % dextrose (VANCOMYCIN IN DEXTROSE 5 %) 1 gram/250 mL Soln Inject 250 mLs (1,000 mg total) into the vein once daily.     Antibiotics (From admission, onward)    Start     Stop Route Frequency Ordered    12/02/20 0900  mupirocin 2 % ointment      12/07 0859 Nasl 2 times daily 12/01/20 2239    12/02/20 0300  vancomycin in dextrose 5 % 1 gram/250 mL IVPB 1,000 mg      -- IV Every 24 hours (non-standard times) 12/02/20 0148        Antifungals (From admission, onward)    None         Antivirals (From admission, onward)    None           Immunization History   Administered Date(s) Administered    Influenza (FLUAD) - Quadrivalent - Adjuvanted - PF *Preferred* (65+) 10/01/2020    PPD Test 07/29/2020, 10/13/2020       Family History     None        Social History     Socioeconomic History    Marital status:      Spouse name: Not on file    Number of children: Not on file    Years of education: Not on file    Highest education level: Not on file   Occupational History    Not on file   Social Needs    Financial resource strain: Not on file    Food insecurity     Worry: Not on file     Inability: Not on file    Transportation needs     Medical: Not on file     Non-medical: Not on file   Tobacco Use    Smoking status: Unknown If Ever Smoked   Substance and Sexual Activity    Alcohol use: Not Currently    Drug use: Never    Sexual activity: Not Currently   Lifestyle    Physical activity     Days per week: Not on file     Minutes per session: Not on file    Stress: Not on file   Relationships    Social connections     Talks on phone: Not on file     Gets together: Not on file     Attends Denominational service: Not on file     Active member of club or organization: Not on file     Attends meetings of clubs or organizations: Not on file     Relationship status: Not on file   Other Topics Concern    Not on file   Social History Narrative    Not on file     Review of Systems   Constitutional: Negative for appetite change, chills, diaphoresis, fatigue, fever and unexpected weight change.   HENT: Negative for congestion, ear pain, hearing loss, sore throat and tinnitus.    Eyes: Negative for pain, redness and visual disturbance.   Respiratory: Negative for cough, chest tightness, shortness of breath and wheezing.    Cardiovascular: Negative for chest pain.   Gastrointestinal: Negative for abdominal pain, constipation, diarrhea, nausea and vomiting.   Endocrine: Negative for cold  intolerance and heat intolerance.   Genitourinary: Negative for decreased urine volume, difficulty urinating, dysuria, flank pain, frequency, hematuria and urgency.   Musculoskeletal: Positive for back pain. Negative for arthralgias, myalgias and neck pain.   Skin: Negative for rash and wound.   Allergic/Immunologic: Negative for environmental allergies, food allergies and immunocompromised state.   Neurological: Negative for dizziness, facial asymmetry, weakness, light-headedness, numbness and headaches.   Hematological: Negative for adenopathy. Does not bruise/bleed easily.   Psychiatric/Behavioral: Negative for agitation, behavioral problems and confusion.     Objective:     Vital Signs (Most Recent):  Temp: 98 °F (36.7 °C) (12/02/20 1550)  Pulse: 63 (12/02/20 1313)  Resp: 16 (12/02/20 1325)  BP: (!) 119/57 (12/02/20 1550)  SpO2: 99 % (12/02/20 1313) Vital Signs (24h Range):  Temp:  [97.6 °F (36.4 °C)-98.3 °F (36.8 °C)] 98 °F (36.7 °C)  Pulse:  [63-84] 63  Resp:  [13-20] 16  SpO2:  [93 %-99 %] 99 %  BP: (106-154)/(52-92) 119/57     Weight: 125.4 kg (276 lb 7.3 oz)  Body mass index is 39.67 kg/m².    Estimated Creatinine Clearance: 73.3 mL/min (based on SCr of 1 mg/dL).    Physical Exam  Constitutional:       General: She is not in acute distress.     Appearance: She is well-developed. She is not diaphoretic.   HENT:      Head: Normocephalic and atraumatic.   Cardiovascular:      Rate and Rhythm: Normal rate and regular rhythm.      Heart sounds: Normal heart sounds. No murmur. No friction rub. No gallop.    Pulmonary:      Effort: Pulmonary effort is normal. No respiratory distress.      Breath sounds: Normal breath sounds. No wheezing or rales.   Abdominal:      General: Bowel sounds are normal. There is no distension.      Palpations: Abdomen is soft. There is no mass.      Tenderness: There is no abdominal tenderness. There is no guarding or rebound.   Skin:     General: Skin is warm and dry.   Neurological:       Mental Status: She is alert and oriented to person, place, and time.   Psychiatric:         Behavior: Behavior normal.         Significant Labs:   Blood Culture:   Recent Labs   Lab 09/23/20  1433 09/26/20  1452 10/08/20  2344 10/08/20  2345 12/02/20  0020   LABBLOO Gram stain dale bottle: Gram positive cocci in clusters resembling Staph   Results called to and read back by:Parag Galvan RN 09/24/2020  11:30  Gram stain aer bottle: Gram positive cocci in clusters resembling Staph   Positive results previously called 09/24/2020  13:51  METHICILLIN RESISTANT STAPHYLOCOCCUS AUREUS  ID consult required at [facility] and [place] locations.  For susceptibility see order #[id number]  * No growth after 5 days. No growth after 5 days. No growth after 5 days. No Growth to date     CBC:   Recent Labs   Lab 12/02/20  0021 12/02/20  0510   WBC 7.94 7.20   HGB 11.0* 10.7*   HCT 37.6 36.8*   * 307     CMP:   Recent Labs   Lab 12/01/20  0547 12/02/20  0021 12/02/20  0510    137 138   K 3.8 3.9 3.6    98 100   CO2 30* 30* 28   GLU 93 88 71   BUN 13 15 15   CREATININE 1.0 1.1 1.0   CALCIUM 9.9 10.3 10.1   PROT  --  6.8  --    ALBUMIN  --  2.8*  --    BILITOT  --  0.3  --    ALKPHOS  --  117  --    AST  --  12  --    ALT  --  10  --    ANIONGAP 9 9 10   EGFRNONAA 56* 50.3* 56.4*     Wound Culture:   Recent Labs   Lab 10/16/20  1228   LABAERO No growth     All pertinent labs within the past 24 hours have been reviewed.    Significant Imaging: I have reviewed all pertinent imaging results/findings within the past 24 hours.   MRI SPINE CERVICAL-THORACIC-LUMBAR W W/O CONTRAST (XPD) [[id number]] (Abnormal) Resulted: 12/02/20 0626   Order Status: Completed Updated: 12/02/20 0628   Narrative:     EXAMINATION:   MRI SPINE CERVICAL-THORACIC-LUMBAR W W/O CONTRAST (XPD)     CLINICAL HISTORY:   worsening osteo;     TECHNIQUE:   Multiplanar, multisequence MR images of the cervical, thoracic and lumbar spine were  performed before and after the administration of 10 cc gadolinium based IV contrast.     COMPARISON:   *CT thoracic spine 11/30/2020 10/13/2020   *MRI thoracic spine 11/09/2020, 10/12/2020   *CT lumbar spine 10/13/2020   *MRI lumbar spine 10/12/2020     FINDINGS:   Cervical spine:     Please note image quality is degraded by patient motion artifact, most notably sagittal postcontrast images.  Cervical vertebral bodies demonstrate no evidence to suggest acute fracture or abnormal infiltrating marrow replacement process.  There is multilevel intervertebral disc desiccation and disc height loss most pronounced at the C5-C6 level.  The craniocervical junction is within normal limits.  The cervical spinal cord is normal in caliber and signal intensity.  There is multilevel degenerative change of the cervical spine consisting of uncovertebral joint DJD, posterior disc osteophyte complexes and facet arthropathy.  Degenerative change most pronounced at the C5-C6 level where there is effacement of the anterior thecal sac and moderate/severe bilateral neural foraminal narrowing (right greater than left).  Following the administration of IV contrast, no pathologic foci of enhancement are appreciated.  Incidentally visualized soft tissue structures demonstrate a presumed sebaceous cyst in the right posterior extra thoracic soft tissues.     Thoracic:     There is redemonstration of abnormal marrow signal intensity and postcontrast enhancement involving the T9-T10 level consistent with patient's known osteomyelitis/discitis.  There is abnormal fluid signal and peripheral enhancement involving the T9-T10 intervertebral disc space concerning for associated disc space abscess.  There is abnormal signal intensity and postcontrast enhancement involving the anterior and posterior epidural spaces from the T8 through T10 level in keeping with associated epidural phlegmon.  There is associated mass effect on the thoracic spinal cord  without definite abnormal intramedullary cord signal intensity.  Phlegmonous change appears to involve the T8-T9 and T9-T10 neural foramina.  There is associated paravertebral phlegmonous change also at these levels.  There is subtle T2/STIR signal hyperintensity involving the T3-T4 intervertebral disc space noting this appears increased in conspicuity from prior examination.  This could reflect an additional region of discitis/osteomyelitis and attention on follow-up exam is advised.  Thoracic vertebral body alignment is stable and there is redemonstration of multilevel degenerative change.  Incidentally visualized intrathoracic structures demonstrate a moderate-sized hiatal hernia and left pleural fluid.     Lumbar spine:     There is grade 2 anterolisthesis of L5 on S1 secondary to bilateral L5 pars defects, unchanged from prior examination.  There is grade 1 anterolisthesis of L3 on L4, also unchanged.  Lumbar vertebral bodies demonstrate no evidence of acute fracture or infiltrating marrow replacement process.  There is multilevel intervertebral disc desiccation.  The conus medullaris is normal in morphology and terminates at the T12-L1 level.  Following the administration of IV contrast, no pathologic foci of enhancement are appreciated.  There is multilevel degenerative change of the lumbar spine similar to most recent MRI of 10/12/2020.  There is a subcentimeter right renal cortical T2 hyperintensity, likely a cyst.  There is fatty atrophy of the paraspinal musculature.    Impression:       1. Findings in keeping with known T9-T10 and osteomyelitis/discitis with associated intervertebral disc space abscess, epidural phlegmon with associated mass effect on the thoracic spinal cord and paravertebral phlegmon as discussed above.  Findings do not appear significantly improved when compared to most recent exam of 11/09/2020.   2. Subtle T2/STIR signal hyperintensity involving the T3-T4 intervertebral disc space,  increased in conspicuity from prior examination. This could reflect an additional region of discitis/osteomyelitis and attention on follow-up exam is advised.   3. No evidence to suggest osteomyelitis/discitis in the cervical or lumbar spine at this time.  Multilevel degenerative change of the cervical and lumbar spine.   4. Moderate size hiatal hernia and left pleural effusion.   This report was flagged in Epic as abnormal.     Electronically signed by resident: Leif Glover   Date: 12/02/2020   Time: 04:58     Electronically signed by: Christy Mcgee MD   Date: 12/02/2020   Time: 06:26   Imaging History    2020  Date Procedure Name Status Accession Number Location   12/02/20 04:03 AM MRI SPINE CERVICAL-THORACIC-LUMBAR W W/O CONTRAST (XPD) Final [id number]   11/30/20 02:10 PM CT Thoracic Spine Without Contrast Final [id number] [facility]   11/09/20 12:08 PM MRI Thoracic Spine W WO Cont Final [id number] [facility]

## 2020-12-03 NOTE — PLAN OF CARE
Problem: Fall Injury Risk  Goal: Absence of Fall and Fall-Related Injury  Outcome: Ongoing, Progressing  Intervention: Identify and Manage Contributors to Fall Injury Risk  Flowsheets (Taken 12/3/2020 9603)  Self-Care Promotion:   safe use of adaptive equipment encouraged   BADL personal objects within reach  Medication Review/Management: medications reviewed    Plan of care reviewed with patient. PRN pain medication given as ordered. Safety maintained, call light in reach, bed in lowest position, will continue to monitor.

## 2020-12-03 NOTE — TELEPHONE ENCOUNTER
Reviewed visitor policy w/daughter, Sx date, and procedure.  States she has been unable to reach pt.  Denies any further needs at this time. V/U. ----- Message from Nidia Botello sent at 12/3/2020  4:14 PM CST -----  [facility] daughter Ailin calling regarding Patient Advice stated pt is admitting into the hospital.she want to know if her procedure need rescheduling.  call back [phone]

## 2020-12-03 NOTE — SUBJECTIVE & OBJECTIVE
Interval History: NAEON. Patient compliant with spinal precautions, HOB < 30. No changes on exam. Will obtain CT cervical spine to further assess prevertebral edema at C5-6 on MRI.  Patient denies neck pain or radicular upper extremity pain. Voiding spontaneously. Plan for OR Tuesday.    Medications:  Continuous Infusions:  Scheduled Meds:   acetaminophen  1,000 mg Oral Q8H    ceftaroline fosamil  600 mg Intravenous Q8H    donepeziL  10 mg Oral QHS    DULoxetine  60 mg Oral Daily    heparin (porcine)  5,000 Units Subcutaneous Q8H    memantine  10 mg Oral BID    methocarbamoL  500 mg Oral QID    metoprolol succinate  50 mg Oral Daily    mupirocin   Nasal BID    pantoprazole  40 mg Oral Before breakfast    pravastatin  40 mg Oral QHS    senna-docusate 8.6-50 mg  1 tablet Oral BID     PRN Meds:acetaminophen, albuterol-ipratropium, dextrose 50%, dextrose 50%, glucagon (human recombinant), glucose, glucose, glucose, insulin aspart U-100, ondansetron, oxyCODONE, oxyCODONE     Objective:     Weight: 125.4 kg (276 lb 7.3 oz)  Body mass index is 39.67 kg/m².  Vital Signs (Most Recent):  Temp: 97.8 °F (36.6 °C) (12/03/20 1100)  Pulse: 74 (12/03/20 0748)  Resp: 18 (12/03/20 1014)  BP: 133/60 (12/03/20 0748)  SpO2: 99 % (12/03/20 0748) Vital Signs (24h Range):  Temp:  [97.8 °F (36.6 °C)-98.4 °F (36.9 °C)] 97.8 °F (36.6 °C)  Pulse:  [65-74] 74  Resp:  [16-18] 18  SpO2:  [92 %-99 %] 99 %  BP: (119-138)/(57-81) 133/60     Date 12/03/20 0700 - 12/04/20 0659   Shift 8366-0960 3077-0228 0526-3706 24 Hour Total   INTAKE   P.O. 360   360   Shift Total(mL/kg) 360(2.9)   360(2.9)   OUTPUT   Urine(mL/kg/hr) 700(0.7)   700   Stool 0   0   Shift Total(mL/kg) 700(5.6)   700(5.6)   Weight (kg) 125.4 125.4 125.4 125.4                   Female External Urinary Catheter 12/01/20 2200 (Active)   Skin no redness;no breakdown 12/03/20 0800   Tolerance no signs/symptoms of discomfort 12/03/20 0800   Suction Continuous suction at 70 mmHg  12/03/20 0800   Date of last wick change 12/01/20 12/02/20 0740   Time of last wick change 2200 12/02/20 0740   Output (mL) 100 mL 12/03/20 1405       Neurosurgery Physical Exam    General: well developed, well nourished, no distress.   Head: normocephalic, atraumatic  Neck: No tracheal deviation. No palpable masses. Full ROM.   Neurologic: Alert and oriented. Thought content appropriate.  GCS: Motor: 6/Verbal: 5/Eyes: 4 GCS Total: 15  Mental Status: Awake, Alert, Oriented x 4  Language: No aphasia  Speech: No dysarthria  Cranial nerves: face symmetric, tongue midline, CN II-XII grossly intact.   Eyes: pupils equal, round, reactive to light with accomodation, EOMI.  Ears: No drainage.   Pulmonary: normal respirations, no signs of respiratory distress  Abdomen: soft, non-distended, not tender to palpation  Sensory: intact to light touch throughout  Motor Strength: Moves all extremities spontaneously with good tone.  Pain limited weakness in proximal BLE limiting exam, limited also by body habitus. No abnormal movements seen.     Strength  Deltoids Triceps Biceps Wrist Extension Wrist Flexion Hand    Upper: R 5/5 5/5 5/5 5/5 5/5 5/5    L 5/5 5/5 5/5 5/5 5/5 5/5     Iliopsoas Quadriceps Knee  Flexion Tibialis  anterior Gastro- cnemius EHL   Lower: R 3/5 3/5 3/5 5/5 5/5 5/5    L 4-/5 4-/5 4-/5 5/5 5/5 5/5     Stein: absent  Clonus: absent  DTR: 2+ and symmetric in brachioradialis, biceps, patellar  Vascular: No LE edema.   Skin: Skin is warm, dry and intact.      Significant Labs:  Recent Labs   Lab 12/02/20  0021 12/02/20  0510 12/03/20  0249   GLU 88 71 78    138 140   K 3.9 3.6 3.6   CL 98 100 101   CO2 30* 28 30*   BUN 15 15 12   CREATININE 1.1 1.0 1.0   CALCIUM 10.3 10.1 10.0     Recent Labs   Lab 12/02/20  0021 12/02/20  0510 12/03/20  0250   WBC 7.94 7.20 6.90   HGB 11.0* 10.7* 10.3*   HCT 37.6 36.8* 35.1*   * 307 329     Recent Labs   Lab 12/02/20  0021   INR 0.9   APTT 25.1     Microbiology  Results (last 7 days)     Procedure Component Value Units Date/Time    Blood culture [[id number]] Collected: 12/02/20 0020    Order Status: Completed Specimen: Blood Updated: 12/03/20 0613     Blood Culture, Routine No Growth to date      No Growth to date        Recent Lab Results       12/03/20  0250   12/03/20  0249        Anion Gap   9     Baso # 0.03       Basophil % 0.4       BUN   12     Calcium   10.0     Chloride   101     CO2   30     Creatinine   1.0     Differential Method Automated       eGFR if    >60.0     eGFR if non    56.4  Comment:  Calculation used to obtain the estimated glomerular filtration  rate (eGFR) is the CKD-EPI equation.        Eos # 0.3       Eosinophil % 3.6       Glucose   78     Gran # (ANC) 3.5       Gran % 50.8       Hematocrit 35.1       Hemoglobin 10.3       Immature Grans (Abs) 0.02  Comment:  Mild elevation in immature granulocytes is non specific and   can be seen in a variety of conditions including stress response,   acute inflammation, trauma and pregnancy. Correlation with other   laboratory and clinical findings is essential.         Immature Granulocytes 0.3       Lymph # 2.2       Lymph % 31.6       MCH 27.4       MCHC 29.3       MCV 93       Mono # 0.9       Mono % 13.3       MPV 9.5       nRBC 0       Platelets 329       Potassium   3.6     RBC 3.76       RDW 16.7       Sodium   140     WBC 6.90           All pertinent labs from the last 24 hours have been reviewed.    Significant Diagnostics:  I have reviewed all pertinent imaging results/findings within the past 24 hours.

## 2020-12-04 LAB
ANION GAP SERPL CALC-SCNC: 11 MMOL/L (ref 8–16)
BASOPHILS # BLD AUTO: 0.04 K/UL (ref 0–0.2)
BASOPHILS NFR BLD: 0.6 % (ref 0–1.9)
BUN SERPL-MCNC: 12 MG/DL (ref 8–23)
CALCIUM SERPL-MCNC: 9.8 MG/DL (ref 8.7–10.5)
CHLORIDE SERPL-SCNC: 100 MMOL/L (ref 95–110)
CO2 SERPL-SCNC: 26 MMOL/L (ref 23–29)
CREAT SERPL-MCNC: 1.1 MG/DL (ref 0.5–1.4)
DIFFERENTIAL METHOD: ABNORMAL
EOSINOPHIL # BLD AUTO: 0.3 K/UL (ref 0–0.5)
EOSINOPHIL NFR BLD: 4 % (ref 0–8)
ERYTHROCYTE [DISTWIDTH] IN BLOOD BY AUTOMATED COUNT: 16.7 % (ref 11.5–14.5)
EST. GFR  (AFRICAN AMERICAN): 58 ML/MIN/1.73 M^2
EST. GFR  (NON AFRICAN AMERICAN): 50.3 ML/MIN/1.73 M^2
GLUCOSE SERPL-MCNC: 89 MG/DL (ref 70–110)
HCT VFR BLD AUTO: 35.3 % (ref 37–48.5)
HGB BLD-MCNC: 10.2 G/DL (ref 12–16)
IMM GRANULOCYTES # BLD AUTO: 0.02 K/UL (ref 0–0.04)
IMM GRANULOCYTES NFR BLD AUTO: 0.3 % (ref 0–0.5)
LYMPHOCYTES # BLD AUTO: 1.9 K/UL (ref 1–4.8)
LYMPHOCYTES NFR BLD: 29.3 % (ref 18–48)
MCH RBC QN AUTO: 27.3 PG (ref 27–31)
MCHC RBC AUTO-ENTMCNC: 28.9 G/DL (ref 32–36)
MCV RBC AUTO: 94 FL (ref 82–98)
MONOCYTES # BLD AUTO: 0.8 K/UL (ref 0.3–1)
MONOCYTES NFR BLD: 12 % (ref 4–15)
NEUTROPHILS # BLD AUTO: 3.5 K/UL (ref 1.8–7.7)
NEUTROPHILS NFR BLD: 53.8 % (ref 38–73)
NRBC BLD-RTO: 0 /100 WBC
PLATELET # BLD AUTO: 298 K/UL (ref 150–350)
PMV BLD AUTO: 10 FL (ref 9.2–12.9)
POTASSIUM SERPL-SCNC: 3.7 MMOL/L (ref 3.5–5.1)
RBC # BLD AUTO: 3.74 M/UL (ref 4–5.4)
SODIUM SERPL-SCNC: 137 MMOL/L (ref 136–145)
WBC # BLD AUTO: 6.58 K/UL (ref 3.9–12.7)

## 2020-12-04 PROCEDURE — 25000003 PHARM REV CODE 250: Performed by: PHYSICIAN ASSISTANT

## 2020-12-04 PROCEDURE — 99233 PR SUBSEQUENT HOSPITAL CARE,LEVL III: ICD-10-PCS | Mod: ,,, | Performed by: PHYSICIAN ASSISTANT

## 2020-12-04 PROCEDURE — 36415 COLL VENOUS BLD VENIPUNCTURE: CPT

## 2020-12-04 PROCEDURE — 99233 SBSQ HOSP IP/OBS HIGH 50: CPT | Mod: ,,, | Performed by: PHYSICIAN ASSISTANT

## 2020-12-04 PROCEDURE — 25000003 PHARM REV CODE 250: Performed by: STUDENT IN AN ORGANIZED HEALTH CARE EDUCATION/TRAINING PROGRAM

## 2020-12-04 PROCEDURE — 63600175 PHARM REV CODE 636 W HCPCS: Performed by: PHYSICIAN ASSISTANT

## 2020-12-04 PROCEDURE — 63600175 PHARM REV CODE 636 W HCPCS: Mod: JG | Performed by: PHYSICIAN ASSISTANT

## 2020-12-04 PROCEDURE — 20600001 HC STEP DOWN PRIVATE ROOM

## 2020-12-04 PROCEDURE — 85025 COMPLETE CBC W/AUTO DIFF WBC: CPT

## 2020-12-04 PROCEDURE — 80048 BASIC METABOLIC PNL TOTAL CA: CPT

## 2020-12-04 RX ADMIN — OXYCODONE HYDROCHLORIDE 10 MG: 10 TABLET ORAL at 10:12

## 2020-12-04 RX ADMIN — MUPIROCIN: 20 OINTMENT TOPICAL at 08:12

## 2020-12-04 RX ADMIN — CEFTAROLINE FOSAMIL 600 MG: 600 POWDER, FOR SOLUTION INTRAVENOUS at 02:12

## 2020-12-04 RX ADMIN — METHOCARBAMOL 500 MG: 500 TABLET ORAL at 02:12

## 2020-12-04 RX ADMIN — MEMANTINE HYDROCHLORIDE 10 MG: 10 TABLET ORAL at 08:12

## 2020-12-04 RX ADMIN — OXYCODONE HYDROCHLORIDE 5 MG: 5 TABLET ORAL at 05:12

## 2020-12-04 RX ADMIN — PRAVASTATIN SODIUM 40 MG: 40 TABLET ORAL at 09:12

## 2020-12-04 RX ADMIN — OXYCODONE HYDROCHLORIDE 5 MG: 5 TABLET ORAL at 02:12

## 2020-12-04 RX ADMIN — MEMANTINE HYDROCHLORIDE 10 MG: 10 TABLET ORAL at 09:12

## 2020-12-04 RX ADMIN — CEFTAROLINE FOSAMIL 600 MG: 600 POWDER, FOR SOLUTION INTRAVENOUS at 05:12

## 2020-12-04 RX ADMIN — ACETAMINOPHEN 1000 MG: 500 TABLET ORAL at 10:12

## 2020-12-04 RX ADMIN — ACETAMINOPHEN 1000 MG: 500 TABLET ORAL at 02:12

## 2020-12-04 RX ADMIN — CEFTAROLINE FOSAMIL 600 MG: 600 POWDER, FOR SOLUTION INTRAVENOUS at 09:12

## 2020-12-04 RX ADMIN — MUPIROCIN: 20 OINTMENT TOPICAL at 10:12

## 2020-12-04 RX ADMIN — HEPARIN SODIUM 5000 UNITS: 5000 INJECTION INTRAVENOUS; SUBCUTANEOUS at 09:12

## 2020-12-04 RX ADMIN — HEPARIN SODIUM 5000 UNITS: 5000 INJECTION INTRAVENOUS; SUBCUTANEOUS at 02:12

## 2020-12-04 RX ADMIN — METOPROLOL SUCCINATE 50 MG: 50 TABLET, EXTENDED RELEASE ORAL at 08:12

## 2020-12-04 RX ADMIN — DONEPEZIL HYDROCHLORIDE 10 MG: 10 TABLET ORAL at 09:12

## 2020-12-04 RX ADMIN — HEPARIN SODIUM 5000 UNITS: 5000 INJECTION INTRAVENOUS; SUBCUTANEOUS at 05:12

## 2020-12-04 RX ADMIN — METHOCARBAMOL 500 MG: 500 TABLET ORAL at 05:12

## 2020-12-04 RX ADMIN — SENNOSIDES AND DOCUSATE SODIUM 1 TABLET: 8.6; 5 TABLET ORAL at 09:12

## 2020-12-04 RX ADMIN — SENNOSIDES AND DOCUSATE SODIUM 1 TABLET: 8.6; 5 TABLET ORAL at 08:12

## 2020-12-04 RX ADMIN — PANTOPRAZOLE SODIUM 40 MG: 40 TABLET, DELAYED RELEASE ORAL at 05:12

## 2020-12-04 RX ADMIN — METHOCARBAMOL 500 MG: 500 TABLET ORAL at 08:12

## 2020-12-04 RX ADMIN — ACETAMINOPHEN 1000 MG: 500 TABLET ORAL at 05:12

## 2020-12-04 RX ADMIN — DULOXETINE HYDROCHLORIDE 60 MG: 60 CAPSULE, DELAYED RELEASE ORAL at 08:12

## 2020-12-04 RX ADMIN — METHOCARBAMOL 500 MG: 500 TABLET ORAL at 09:12

## 2020-12-04 NOTE — PROGRESS NOTES
[facility]  Neurosurgery  Progress Note    Subjective:     History of Present Illness: 70 y.o. female known to [facility] with PMH of Alzheimer's dz, HTN, HLD, CAD, YOVANI, and morbid obesity who was recently hospitalized x2 for MRSA bacteremia complicated by pneumonia and possible UTI, treated with linozolid x8 days and cipro and discharged 10/1. [place] was then [place] [facility] [place] with severe back pain. MRI revealed T9-10 osteodiscitis and T8-10 epidural phlegmon with associated paraverterbral abscesses. Spine infection likely hematogenous from under-treated bacteremia. Patient had no neurologic deficits on exam, no indications for emergent neurosurgical intervention, plan was made for conservative non-surgical treatment. She underwent needle aspiration of T9-10 disc space on 10/16, cultures were negative although she had already been undergoing antibiotic treatment. Repeat BCx remained no growth and she remained afebrile and neurologically stable. Patient was discharged to [facility] on 10/20 with plan for Vancomycin IV x 8 weeks (estimated end date 12/3).    Patient presents today after interval imaging obtained 11/30 revealed worsening bony destruction, unstable T9 fracture. Transferred for [facility].     Post-Op Info:  Procedure(s) (LRB):  T9-T10 corpectomy, posterior instrumented fusion T7-T12. Aero. Globus, Depuy. Neuromonitoring. (N/A)         Interval History: NAEON. Vitals and labs stable. Pt reports continued mid-back pain, somewhat improved on current pain regimen. Denies weakness, numbness/paresthesias, and b/b dysfunction. Denies neck pain. CT C-spine stable with degenerative changes, no significant prevertebral swelling. Pending OR Tuesday.    Medications:  Continuous Infusions:  Scheduled Meds:   acetaminophen  1,000 mg Oral Q8H    ceftaroline fosamil  600 mg Intravenous Q8H    donepeziL  10 mg Oral QHS    DULoxetine  60 mg Oral Daily    heparin (porcine)  5,000 Units  Subcutaneous Q8H    memantine  10 mg Oral BID    methocarbamoL  500 mg Oral QID    metoprolol succinate  50 mg Oral Daily    mupirocin   Nasal BID    pantoprazole  40 mg Oral Before breakfast    pravastatin  40 mg Oral QHS    senna-docusate 8.6-50 mg  1 tablet Oral BID     PRN Meds:acetaminophen, albuterol-ipratropium, dextrose 50%, dextrose 50%, glucagon (human recombinant), glucose, glucose, glucose, insulin aspart U-100, ondansetron, oxyCODONE, oxyCODONE     Review of Systems   Constitutional: Negative for chills and fever.   Gastrointestinal: Negative for nausea and vomiting.   Genitourinary: Negative for difficulty urinating and dysuria.   Musculoskeletal: Positive for back pain. Negative for neck pain.   Neurological: Negative for weakness and numbness.   Psychiatric/Behavioral: Negative for behavioral problems and confusion.     Objective:     Weight: 125.4 kg (276 lb 7.3 oz)  Body mass index is 39.67 kg/m².  Vital Signs (Most Recent):  Temp: 97.8 °F (36.6 °C) (12/04/20 1533)  Pulse: 70 (12/04/20 1533)  Resp: 17 (12/04/20 1533)  BP: (!) 122/56 (12/04/20 1533)  SpO2: (!) 92 % (12/04/20 1533) Vital Signs (24h Range):  Temp:  [97.8 °F (36.6 °C)-98.6 °F (37 °C)] 97.8 °F (36.6 °C)  Pulse:  [61-70] 70  Resp:  [17-18] 17  SpO2:  [92 %-99 %] 92 %  BP: (122-149)/(56-77) 122/56     Date 12/04/20 0700 - 12/05/20 0659   Shift 0558-8895 5713-2392 0516-0139 24 Hour Total   INTAKE   P.O. 360   360   Shift Total(mL/kg) 360(2.9)   360(2.9)   OUTPUT   Urine(mL/kg/hr) 400(0.4)   400   Shift Total(mL/kg) 400(3.2)   400(3.2)   Weight (kg) 125.4 125.4 125.4 125.4                   Female External Urinary Catheter 12/01/20 2200 (Active)   Skin no redness;no breakdown 12/04/20 0843   Tolerance no signs/symptoms of discomfort 12/04/20 0843   Suction Continuous suction at 60 mmHg 12/04/20 0843   Date of last wick change 12/03/20 12/03/20 2200   Time of last wick change 2235 12/03/20 2200   Output (mL) 400 mL 12/04/20 1309        Neurosurgery Physical Exam    General: well developed, well nourished, no distress.   Head: normocephalic, atraumatic  Neck: No tracheal deviation. No palpable masses. Full ROM.   Neurologic: Alert and oriented. Thought content appropriate.  GCS: Motor: 6/Verbal: 5/Eyes: 4 GCS Total: 15  Mental Status: Awake, Alert, Oriented x 4  Language: No aphasia  Speech: No dysarthria  Cranial nerves: face symmetric, tongue midline, CN II-XII grossly intact.   Eyes: pupils equal, round, reactive to light with accomodation, EOMI.  Ears: No drainage.   Pulmonary: normal respirations, no signs of respiratory distress  Abdomen: soft, non-distended, not tender to palpation  Sensory: intact to light touch throughout  Motor Strength: Moves all extremities spontaneously with good tone.  Pain limited weakness in proximal BLE limiting exam, limited also by body habitus. No abnormal movements seen.      Strength   Deltoids Triceps Biceps Wrist Extension Wrist Flexion Hand    Upper: R 5/5 5/5 5/5 5/5 5/5 5/5     L 5/5 5/5 5/5 5/5 5/5 5/5       Iliopsoas Quadriceps Knee  Flexion Tibialis  anterior Gastro- cnemius EHL   Lower: R 3/5 3+/5 3+/5 5/5 5/5 5/5     L 4-/5 4-/5 4-/5 5/5 5/5 5/5      Stein: absent  Clonus: absent  DTR: 2+ and symmetric in brachioradialis, biceps, patellar  Vascular: No LE edema.   Skin: Skin is warm, dry and intact.      Significant Labs:  Recent Labs   Lab 12/03/20  0249 12/04/20  0540   GLU 78 89    137   K 3.6 3.7    100   CO2 30* 26   BUN 12 12   CREATININE 1.0 1.1   CALCIUM 10.0 9.8     Recent Labs   Lab 12/03/20  0250 12/04/20  0540   WBC 6.90 6.58   HGB 10.3* 10.2*   HCT 35.1* 35.3*    298     No results for input(s): LABPT, INR, APTT in the last 48 hours.  Microbiology Results (last 7 days)     Procedure Component Value Units Date/Time    Blood culture [[id number]] Collected: 12/02/20 0020    Order Status: Completed Specimen: Blood Updated: 12/04/20 0613     Blood Culture,  Routine No Growth to date      No Growth to date      No Growth to date        All pertinent labs from the last 24 hours have been reviewed.    Significant Diagnostics:  I have reviewed and interpreted all pertinent imaging results/findings within the past 24 hours.    Assessment/Plan:     MRSA bacteremia  70 F with known MRSA bacteremia with thoracic osteomyelitis presenting as transfer from SNF after CT T spine concerning for increased erosion of T9/T10 vertebral bodies as well as bilateral T9 pedicle fractures:    Neurologically stable on exam.    - Admitted to [facility]  -  neurochecks  - Preop labs reviewed  - MRI C/T/L W/WO shows known T9-T10 osteomyelitis/discitis with intervertebral disc space abscess and epidural phlegmon, causing some mass effect on spinal cord but no cord signal changes. Alignment grossly maintained.  - Plan for stabilization with T9-T10 corpectomy and fusion above/below. Tentatively scheduled for 12/8. Will obtain intraoperative cultures and pathology.  - TLSO brace at all times  - Activity: Bed rest with spinal precautions, log roll, HOB < 30 at all times.  - Pain control: scheduled Tylenol and Robaxin, Oxycodone PRN  - Preoperative Clearance:  consulted for preoperative risk assessment and clearance.   - RCRI 10.1% risk.  Patient is moderate risk for moderate risk surgery, benefits of surgery outweigh risk, ok to proceed from medicine standpoint.    - Recommend repeat TTE as outpatient given changes from 8/10 - 10/12. Pt may have had NSTEMI. Endocarditis felt less likely.  - H/o MRSA Bacteremia: Afebrile, no leukocytosis. ESR 97, CRP 12.4.    - ID following, appreciate assistance. Added ceftaroline. Send OR Cx's and pathology.  - CAD, s/p Cardiac Stent: MI in 2000 which required stent placements. Pt takes ASA and Plavix, last dose 11/30.   - Hold ASA/Plavix preoperatively  - Alzheimer's: Continue home memantine and donepezil  - HLD: Continue home pravastatin  -DVT prophylaxis: Nick  SCD's, SQH  -Bowel regimen: senna BID     Contact Neurosurgery with any questions, concerns, or neuro changes.    D/w Dr. Fransisca Morrison, PA-C  Neurosurgery  [facility]

## 2020-12-04 NOTE — PROGRESS NOTES
[facility]  Infectious Disease  Progress Note    Patient Name: Martina Betancourt  MRN: [id number]  Admission Date: 12/1/2020  Length of Stay: 3 days  Attending Physician: Everardo Gongora MD  Primary Care Provider: Joe Fuller Iii, MD    Isolation Status: No active isolations  Assessment/Plan:      * Osteomyelitis of thoracic vertebra  71yo female with Hx of MRSA bacteremia completing 6-8 weeks IV vanc not with pedicular fracture and worsening infection at T8-10 with epidural phlegmon/abscess on vanc  Afebrile and WBC WNL.  Stable non septic   BCXs NGTD  NSGY planning intervention on 12/8    Plan:  1. Continue ceftaroline  2. Monitor clinically  3. OR 12/8 - please send cultures and pathology - NSGY notified  [place]. [place]    Contact Ivanna Adhikari NP during hours over weekend and ID on call after hours as needed.        Anticipated Disposition: tbd    Thank you for your consult. I will follow-up with patient. Please contact us if you have any additional questions.    LAYO Kennedy  Infectious Disease  [facility]    Subjective:     Principal Problem:Osteomyelitis of thoracic vertebra    HPI: 69 yo female with a history of MRSA bactermia and dementia known to ID service and under the treatment for presumed MRSA osteodiscitis at T9-10.  She had been seen in Oct 2020 after having been admitted to and OSH and found to have MRSA bacteremia that was treated as inpt and dc'd on Zyvox to complete 7 d.  She then presented with excruciating back pain and found to have osteodiscitis at T9-10 and BL ST abscesses.  Blood cultures were no growth at that time and [facility] underwent aspiration at T9-10 and it was no growth.  She was discharged on Vancomycin to complete 6-8 weeks EOC 12/3/20 and she has been at [facility]  She had followed up in ID clinic on 11/18 and back pain was improving. Repeat MRI 11/9 was done and showed:  1. Altered signal intensity changes  at T9/T10 with evidence of erosive focus about the opposing endplates and evidence of enhancing phlegmonous formation identified in the central component of the disc with central low signal intensity compatible with osteomyelitis/discitis.  Vertical dimensions of the area of interest cause extreme erosion along the anterior portion of the vertebral bodies of interest without evidence of any significant paraspinal component.  2. No significant soft tissue component.    NSGY fu was arranged.      FU CT scan was planned and done on  showin.Findings compatible with T9-10 discitis/osteomyelitis with surrounding perivertebral soft tissue thickening.  Evaluation for underlying abscess an epidural process limited given the lack of intravenous contrast.  If there is concern for underlying epidural process, consider further evaluation with an MRI of the thoracic spine.  No retropulsion is noted.  2. Interval development of fracture deformity through the bilateral T9 pedicles and posterior superior cortical margin of the T9 vertebral body    MRI C/T/L done showin. Findings in keeping with known T9-T10 and osteomyelitis/discitis with associated intervertebral disc space abscess, epidural phlegmon with associated mass effect on the thoracic spinal cord and paravertebral phlegmon as discussed above.  Findings do not appear significantly improved when compared to most recent exam of 2020.   2. Subtle T2/STIR signal hyperintensity involving the T3-T4 intervertebral disc space, increased in conspicuity from prior examination. This could reflect an additional region of discitis/osteomyelitis and attention on follow-up exam is advised.   3. No evidence to suggest osteomyelitis/discitis in the cervical or lumbar spine at this time.  Multilevel degenerative change of the cervical and lumbar spine.   4. Moderate size hiatal hernia and left pleural effusion.     She was admitted and surgery planned.  Her back pain  has worsened over the last 2-3 weeks but she denies systemic sing of infection.  Blood cultures are NGTD and crp 12.4.  She remains on vanc and denies PICC problems.  The patient denies any recent fever, chills, or sweats.      Interval History: No AEON.  WBC WNL.  BCXS NGTD.  Surgery planned for 12/8. Back pain unchanged. The patient denies any recent fever, chills, or sweats.      Review of Systems   Constitutional: Negative for activity change, chills, diaphoresis and fever.   Respiratory: Negative for cough, shortness of breath and wheezing.    Cardiovascular: Negative for chest pain.   Gastrointestinal: Negative for abdominal pain, constipation, diarrhea, nausea and vomiting.   Genitourinary: Negative for dysuria, frequency and urgency.   Musculoskeletal: Positive for back pain.   Neurological: Negative for dizziness.   Hematological: Does not bruise/bleed easily.     Objective:     Vital Signs (Most Recent):  Temp: 98.6 °F (37 °C) (12/04/20 0815)  Pulse: 69 (12/04/20 0815)  Resp: 18 (12/04/20 0815)  BP: (!) 149/77 (12/04/20 0815)  SpO2: 95 % (12/04/20 0815) Vital Signs (24h Range):  Temp:  [97.8 °F (36.6 °C)-98.6 °F (37 °C)] 98.6 °F (37 °C)  Pulse:  [64-69] 69  Resp:  [18] 18  SpO2:  [95 %-99 %] 95 %  BP: (127-149)/(70-77) 149/77     Weight: 125.4 kg (276 lb 7.3 oz)  Body mass index is 39.67 kg/m².    Estimated Creatinine Clearance: 66.6 mL/min (based on SCr of 1.1 mg/dL).    Physical Exam  Constitutional:       General: She is not in acute distress.     Appearance: She is well-developed. She is not diaphoretic.       HENT:      Head: Normocephalic and atraumatic.   Cardiovascular:      Rate and Rhythm: Normal rate and regular rhythm.      Heart sounds: Normal heart sounds. No murmur. No friction rub. No gallop.    Pulmonary:      Effort: Pulmonary effort is normal. No respiratory distress.      Breath sounds: Normal breath sounds. No wheezing or rales.   Abdominal:      General: Bowel sounds are normal. There  is no distension.      Palpations: Abdomen is soft. There is no mass.      Tenderness: There is no abdominal tenderness. There is no guarding or rebound.   Skin:     General: Skin is warm and dry.   Neurological:      Mental Status: She is alert and oriented to person, place, and time.   Psychiatric:         Behavior: Behavior normal.         Significant Labs:   Blood Culture:   Recent Labs   Lab 09/23/20  1433 09/26/20  1452 10/08/20  2344 10/08/20  2345 12/02/20  0020   LABBLOO Gram stain dale bottle: Gram positive cocci in clusters resembling Staph   Results called to and read back by:Parag Galvan RN 09/24/2020  11:30  Gram stain aer bottle: Gram positive cocci in clusters resembling Staph   Positive results previously called 09/24/2020  13:51  METHICILLIN RESISTANT STAPHYLOCOCCUS AUREUS  ID consult required at [facility] and [facility] locations.  For susceptibility see order #[id number]  * No growth after 5 days. No growth after 5 days. No growth after 5 days. No Growth to date  No Growth to date  No Growth to date     CBC:   Recent Labs   Lab 12/03/20  0250 12/04/20  0540   WBC 6.90 6.58   HGB 10.3* 10.2*   HCT 35.1* 35.3*    298     CMP:   Recent Labs   Lab 12/03/20  0249 12/04/20  0540    137   K 3.6 3.7    100   CO2 30* 26   GLU 78 89   BUN 12 12   CREATININE 1.0 1.1   CALCIUM 10.0 9.8   ANIONGAP 9 11   EGFRNONAA 56.4* 50.3*     Wound Culture:   Recent Labs   Lab 10/16/20  1228   LABAERO No growth     All pertinent labs within the past 24 hours have been reviewed.    Significant Imaging: I have reviewed all pertinent imaging results/findings within the past 24 hours.   CT Cervical Spine Without Contrast [[id number]] Resulted: 12/04/20 0626   Order Status: Completed Updated: 12/04/20 0628   Narrative:     EXAMINATION:   CT CERVICAL SPINE WITHOUT CONTRAST     CLINICAL HISTORY:   evaluate for fracture; prevertebral edema at C5-6 on MRI;     TECHNIQUE:   Low dose axial images,  sagittal and coronal reformations were performed though the cervical spine.  Contrast was not administered.     COMPARISON:   MRI cervical, thoracic and lumbar spine 12/02/2020, cervical spine MRI 06/01/2015     FINDINGS:   Cervical vertebral body alignment appears to be within normal limits.  Vertebral body heights appear maintained and similar to prior MRI of 2015.  No definite CT evidence to suggest acute cervical spine fracture. The facet joints articulate appropriately. No significant prevertebral soft tissue swelling noting the cervical esophagus is slightly thickened. There is multilevel intervertebral disc height loss and extensive multilevel degenerative change of the visualized cervical spine primarily consisting of multiple posterior disc osteophyte complexes, uncovertebral joint DJD and facet arthropathy.  C4-C5 and C5-C6 levels where there are varying degrees of moderate/severe neural foraminal narrowing bilaterally.  The visualized skull base is intact.  The visualized lung apices demonstrate bilateral pleural fluid.    Impression:       1. No CT evidence to suggest acute cervical spine fracture.  Clinical correlation and further evaluation as warranted.   2. Multilevel degenerative change of the cervical spine most pronounced at the C4-C5 and C5-C6 levels.       Electronically signed by: Christy Mcgee MD   Date: 12/04/2020   Time: 06:26   MRI SPINE CERVICAL-THORACIC-LUMBAR W W/O CONTRAST (XPD) [[id number]] (Abnormal) Resulted: 12/02/20 0626   Order Status: Completed Updated: 12/02/20 0628   Narrative:     EXAMINATION:   MRI SPINE CERVICAL-THORACIC-LUMBAR W W/O CONTRAST (XPD)     CLINICAL HISTORY:   worsening osteo;     TECHNIQUE:   Multiplanar, multisequence MR images of the cervical, thoracic and lumbar spine were performed before and after the administration of 10 cc gadolinium based IV contrast.     COMPARISON:   *CT thoracic spine 11/30/2020 10/13/2020   *MRI thoracic spine 11/09/2020, 10/12/2020    *CT lumbar spine 10/13/2020   *MRI lumbar spine 10/12/2020     FINDINGS:   Cervical spine:     Please note image quality is degraded by patient motion artifact, most notably sagittal postcontrast images.  Cervical vertebral bodies demonstrate no evidence to suggest acute fracture or abnormal infiltrating marrow replacement process.  There is multilevel intervertebral disc desiccation and disc height loss most pronounced at the C5-C6 level.  The craniocervical junction is within normal limits.  The cervical spinal cord is normal in caliber and signal intensity.  There is multilevel degenerative change of the cervical spine consisting of uncovertebral joint DJD, posterior disc osteophyte complexes and facet arthropathy.  Degenerative change most pronounced at the C5-C6 level where there is effacement of the anterior thecal sac and moderate/severe bilateral neural foraminal narrowing (right greater than left).  Following the administration of IV contrast, no pathologic foci of enhancement are appreciated.  Incidentally visualized soft tissue structures demonstrate a presumed sebaceous cyst in the right posterior extra thoracic soft tissues.     Thoracic:     There is redemonstration of abnormal marrow signal intensity and postcontrast enhancement involving the T9-T10 level consistent with patient's known osteomyelitis/discitis.  There is abnormal fluid signal and peripheral enhancement involving the T9-T10 intervertebral disc space concerning for associated disc space abscess.  There is abnormal signal intensity and postcontrast enhancement involving the anterior and posterior epidural spaces from the T8 through T10 level in keeping with associated epidural phlegmon.  There is associated mass effect on the thoracic spinal cord without definite abnormal intramedullary cord signal intensity.  Phlegmonous change appears to involve the T8-T9 and T9-T10 neural foramina.  There is associated paravertebral phlegmonous  change also at these levels.  There is subtle T2/STIR signal hyperintensity involving the T3-T4 intervertebral disc space noting this appears increased in conspicuity from prior examination.  This could reflect an additional region of discitis/osteomyelitis and attention on follow-up exam is advised.  Thoracic vertebral body alignment is stable and there is redemonstration of multilevel degenerative change.  Incidentally visualized intrathoracic structures demonstrate a moderate-sized hiatal hernia and left pleural fluid.     Lumbar spine:     There is grade 2 anterolisthesis of L5 on S1 secondary to bilateral L5 pars defects, unchanged from prior examination.  There is grade 1 anterolisthesis of L3 on L4, also unchanged.  Lumbar vertebral bodies demonstrate no evidence of acute fracture or infiltrating marrow replacement process.  There is multilevel intervertebral disc desiccation.  The conus medullaris is normal in morphology and terminates at the T12-L1 level.  Following the administration of IV contrast, no pathologic foci of enhancement are appreciated.  There is multilevel degenerative change of the lumbar spine similar to most recent MRI of 10/12/2020.  There is a subcentimeter right renal cortical T2 hyperintensity, likely a cyst.  There is fatty atrophy of the paraspinal musculature.    Impression:       1. Findings in keeping with known T9-T10 and osteomyelitis/discitis with associated intervertebral disc space abscess, epidural phlegmon with associated mass effect on the thoracic spinal cord and paravertebral phlegmon as discussed above.  Findings do not appear significantly improved when compared to most recent exam of 11/09/2020.   2. Subtle T2/STIR signal hyperintensity involving the T3-T4 intervertebral disc space, increased in conspicuity from prior examination. This could reflect an additional region of discitis/osteomyelitis and attention on follow-up exam is advised.   3. No evidence to suggest  osteomyelitis/discitis in the cervical or lumbar spine at this time.  Multilevel degenerative change of the cervical and lumbar spine.   4. Moderate size hiatal hernia and left pleural effusion.   This report was flagged in Epic as abnormal.     Electronically signed by resident: Leif Glover   Date: 12/02/2020   Time: 04:58     Electronically signed by: Christy Mcgee MD   Date: 12/02/2020   Time: 06:26   Imaging History    2020  Date Procedure Name Status Accession Number Location   12/03/20 03:16 PM CT Cervical Spine Without Contrast Final [id number] [facility]   12/02/20 04:03 AM MRI SPINE CERVICAL-THORACIC-LUMBAR W W/O CONTRAST (XPD) Final [id number] [facility]   11/30/20 02:10 PM CT Thoracic Spine Without Contrast Final [id number] [facility]   11/09/20 12:08 PM MRI Thoracic Spine W WO Cont Final [id number] [facility]

## 2020-12-04 NOTE — ASSESSMENT & PLAN NOTE
70 F with known MRSA bacteremia with thoracic osteomyelitis presenting as transfer from [facility] after CT T spine concerning for increased erosion of T9/T10 vertebral bodies as well as bilateral T9 pedicle fractures:    Neurologically stable on exam.    - Admitted to [facility]  - q4 neurochecks  - Preop labs reviewed  - MRI C/T/L W/WO shows known T9-T10 osteomyelitis/discitis with intervertebral disc space abscess and epidural phlegmon, causing some mass effect on spinal cord but no cord signal changes. Alignment grossly maintained.  - Plan for stabilization with T9-T10 corpectomy and fusion above/below. Tentatively scheduled for 12/8. Will obtain intraoperative cultures and pathology.  - TLSO brace at all times  - Activity: Bed rest with spinal precautions, log roll, HOB < 30 at all times.  - Pain control: scheduled Tylenol and Robaxin, Oxycodone PRN  - Preoperative Clearance:  consulted for preoperative risk assessment and clearance.   - RCRI 10.1% risk.  Patient is moderate risk for moderate risk surgery, benefits of surgery outweigh risk, ok to proceed from medicine standpoint.    - Recommend repeat TTE as outpatient given changes from 8/10 - 10/12. Pt may have had NSTEMI. Endocarditis felt less likely.  - H/o MRSA Bacteremia: Afebrile, no leukocytosis. ESR 97, CRP 12.4.    - ID following, appreciate assistance. Added ceftaroline. Send OR Cx's and pathology.  - CAD, s/p Cardiac Stent: MI in 2000 which required stent placements. Pt takes ASA and Plavix, last dose 11/30.   - Hold ASA/Plavix preoperatively  - Alzheimer's: Continue home memantine and donepezil  - HLD: Continue home pravastatin  -DVT prophylaxis: SHAYLA's, SCD's, SQH  -Bowel regimen: senna BID     Contact Neurosurgery with any questions, concerns, or neuro changes.    D/w Dr. Gongora

## 2020-12-04 NOTE — ASSESSMENT & PLAN NOTE
69yo female with Hx of MRSA bacteremia completing 6-8 weeks IV vanc not with pedicular fracture and worsening infection at T8-10 with epidural phlegmon/abscess on vanc  Afebrile and WBC WNL.  Stable non septic   BCXs NGTD  NSGY planning intervention on 12/8    Plan:  1. Continue ceftaroline  2. Monitor clinically  3. OR 12/8 - please send cultures and pathology - NSGY notified  4. [place]    Contact Ivanna Adhikari NP during hours over weekend and ID on call after hours as needed.

## 2020-12-04 NOTE — SUBJECTIVE & OBJECTIVE
Interval History: No AEON.  WBC WNL.  BCXS NGTD.  Surgery planned for 12/8. Back pain unchanged. The patient denies any recent fever, chills, or sweats.      Review of Systems   Constitutional: Negative for activity change, chills, diaphoresis and fever.   Respiratory: Negative for cough, shortness of breath and wheezing.    Cardiovascular: Negative for chest pain.   Gastrointestinal: Negative for abdominal pain, constipation, diarrhea, nausea and vomiting.   Genitourinary: Negative for dysuria, frequency and urgency.   Musculoskeletal: Positive for back pain.   Neurological: Negative for dizziness.   Hematological: Does not bruise/bleed easily.     Objective:     Vital Signs (Most Recent):  Temp: 98.6 °F (37 °C) (12/04/20 0815)  Pulse: 69 (12/04/20 0815)  Resp: 18 (12/04/20 0815)  BP: (!) 149/77 (12/04/20 0815)  SpO2: 95 % (12/04/20 0815) Vital Signs (24h Range):  Temp:  [97.8 °F (36.6 °C)-98.6 °F (37 °C)] 98.6 °F (37 °C)  Pulse:  [64-69] 69  Resp:  [18] 18  SpO2:  [95 %-99 %] 95 %  BP: (127-149)/(70-77) 149/77     Weight: 125.4 kg (276 lb 7.3 oz)  Body mass index is 39.67 kg/m².    Estimated Creatinine Clearance: 66.6 mL/min (based on SCr of 1.1 mg/dL).    Physical Exam  Constitutional:       General: She is not in acute distress.     Appearance: She is well-developed. She is not diaphoretic.       HENT:      Head: Normocephalic and atraumatic.   Cardiovascular:      Rate and Rhythm: Normal rate and regular rhythm.      Heart sounds: Normal heart sounds. No murmur. No friction rub. No gallop.    Pulmonary:      Effort: Pulmonary effort is normal. No respiratory distress.      Breath sounds: Normal breath sounds. No wheezing or rales.   Abdominal:      General: Bowel sounds are normal. There is no distension.      Palpations: Abdomen is soft. There is no mass.      Tenderness: There is no abdominal tenderness. There is no guarding or rebound.   Skin:     General: Skin is warm and dry.   Neurological:      Mental  Status: She is alert and oriented to person, place, and time.   Psychiatric:         Behavior: Behavior normal.         Significant Labs:   Blood Culture:   Recent Labs   Lab 09/23/20  1433 09/26/20  1452 10/08/20  2344 10/08/20  2345 12/02/20  0020   LABBLOO Gram stain dale bottle: Gram positive cocci in clusters resembling Staph   Results called to and read back by:Parag Galvan RN 09/24/2020  11:30  Gram stain aer bottle: Gram positive cocci in clusters resembling Staph   Positive results previously called 09/24/2020  13:51  METHICILLIN RESISTANT STAPHYLOCOCCUS AUREUS  ID consult required at [facility] and [facility] locations.  For susceptibility see order #[id number]  * No growth after 5 days. No growth after 5 days. No growth after 5 days. No Growth to date  No Growth to date  No Growth to date     CBC:   Recent Labs   Lab 12/03/20  0250 12/04/20  0540   WBC 6.90 6.58   HGB 10.3* 10.2*   HCT 35.1* 35.3*    298     CMP:   Recent Labs   Lab 12/03/20  0249 12/04/20  0540    137   K 3.6 3.7    100   CO2 30* 26   GLU 78 89   BUN 12 12   CREATININE 1.0 1.1   CALCIUM 10.0 9.8   ANIONGAP 9 11   EGFRNONAA 56.4* 50.3*     Wound Culture:   Recent Labs   Lab 10/16/20  1228   LABAERO No growth     All pertinent labs within the past 24 hours have been reviewed.    Significant Imaging: I have reviewed all pertinent imaging results/findings within the past 24 hours.   CT Cervical Spine Without Contrast [[id number]] Resulted: 12/04/20 0626   Order Status: Completed Updated: 12/04/20 0628   Narrative:     EXAMINATION:   CT CERVICAL SPINE WITHOUT CONTRAST     CLINICAL HISTORY:   evaluate for fracture; prevertebral edema at C5-6 on MRI;     TECHNIQUE:   Low dose axial images, sagittal and coronal reformations were performed though the cervical spine.  Contrast was not administered.     COMPARISON:   MRI cervical, thoracic and lumbar spine 12/02/2020, cervical spine MRI 06/01/2015     FINDINGS:    Cervical vertebral body alignment appears to be within normal limits.  Vertebral body heights appear maintained and similar to prior MRI of 2015.  No definite CT evidence to suggest acute cervical spine fracture. The facet joints articulate appropriately. No significant prevertebral soft tissue swelling noting the cervical esophagus is slightly thickened. There is multilevel intervertebral disc height loss and extensive multilevel degenerative change of the visualized cervical spine primarily consisting of multiple posterior disc osteophyte complexes, uncovertebral joint DJD and facet arthropathy.  C4-C5 and C5-C6 levels where there are varying degrees of moderate/severe neural foraminal narrowing bilaterally.  The visualized skull base is intact.  The visualized lung apices demonstrate bilateral pleural fluid.    Impression:       1. No CT evidence to suggest acute cervical spine fracture.  Clinical correlation and further evaluation as warranted.   2. Multilevel degenerative change of the cervical spine most pronounced at the C4-C5 and C5-C6 levels.       Electronically signed by: Christy Mcgee MD   Date: 12/04/2020   Time: 06:26   MRI SPINE CERVICAL-THORACIC-LUMBAR W W/O CONTRAST (XPD) [[id number]] (Abnormal) Resulted: 12/02/20 0626   Order Status: Completed Updated: 12/02/20 0628   Narrative:     EXAMINATION:   MRI SPINE CERVICAL-THORACIC-LUMBAR W W/O CONTRAST (XPD)     CLINICAL HISTORY:   worsening osteo;     TECHNIQUE:   Multiplanar, multisequence MR images of the cervical, thoracic and lumbar spine were performed before and after the administration of 10 cc gadolinium based IV contrast.     COMPARISON:   *CT thoracic spine 11/30/2020 10/13/2020   *MRI thoracic spine 11/09/2020, 10/12/2020   *CT lumbar spine 10/13/2020   *MRI lumbar spine 10/12/2020     FINDINGS:   Cervical spine:     Please note image quality is degraded by patient motion artifact, most notably sagittal postcontrast images.  Cervical  vertebral bodies demonstrate no evidence to suggest acute fracture or abnormal infiltrating marrow replacement process.  There is multilevel intervertebral disc desiccation and disc height loss most pronounced at the C5-C6 level.  The craniocervical junction is within normal limits.  The cervical spinal cord is normal in caliber and signal intensity.  There is multilevel degenerative change of the cervical spine consisting of uncovertebral joint DJD, posterior disc osteophyte complexes and facet arthropathy.  Degenerative change most pronounced at the C5-C6 level where there is effacement of the anterior thecal sac and moderate/severe bilateral neural foraminal narrowing (right greater than left).  Following the administration of IV contrast, no pathologic foci of enhancement are appreciated.  Incidentally visualized soft tissue structures demonstrate a presumed sebaceous cyst in the right posterior extra thoracic soft tissues.     Thoracic:     There is redemonstration of abnormal marrow signal intensity and postcontrast enhancement involving the T9-T10 level consistent with patient's known osteomyelitis/discitis.  There is abnormal fluid signal and peripheral enhancement involving the T9-T10 intervertebral disc space concerning for associated disc space abscess.  There is abnormal signal intensity and postcontrast enhancement involving the anterior and posterior epidural spaces from the T8 through T10 level in keeping with associated epidural phlegmon.  There is associated mass effect on the thoracic spinal cord without definite abnormal intramedullary cord signal intensity.  Phlegmonous change appears to involve the T8-T9 and T9-T10 neural foramina.  There is associated paravertebral phlegmonous change also at these levels.  There is subtle T2/STIR signal hyperintensity involving the T3-T4 intervertebral disc space noting this appears increased in conspicuity from prior examination.  This could reflect an  additional region of discitis/osteomyelitis and attention on follow-up exam is advised.  Thoracic vertebral body alignment is stable and there is redemonstration of multilevel degenerative change.  Incidentally visualized intrathoracic structures demonstrate a moderate-sized hiatal hernia and left pleural fluid.     Lumbar spine:     There is grade 2 anterolisthesis of L5 on S1 secondary to bilateral L5 pars defects, unchanged from prior examination.  There is grade 1 anterolisthesis of L3 on L4, also unchanged.  Lumbar vertebral bodies demonstrate no evidence of acute fracture or infiltrating marrow replacement process.  There is multilevel intervertebral disc desiccation.  The conus medullaris is normal in morphology and terminates at the T12-L1 level.  Following the administration of IV contrast, no pathologic foci of enhancement are appreciated.  There is multilevel degenerative change of the lumbar spine similar to most recent MRI of 10/12/2020.  There is a subcentimeter right renal cortical T2 hyperintensity, likely a cyst.  There is fatty atrophy of the paraspinal musculature.    Impression:       1. Findings in keeping with known T9-T10 and osteomyelitis/discitis with associated intervertebral disc space abscess, epidural phlegmon with associated mass effect on the thoracic spinal cord and paravertebral phlegmon as discussed above.  Findings do not appear significantly improved when compared to most recent exam of 11/09/2020.   2. Subtle T2/STIR signal hyperintensity involving the T3-T4 intervertebral disc space, increased in conspicuity from prior examination. This could reflect an additional region of discitis/osteomyelitis and attention on follow-up exam is advised.   3. No evidence to suggest osteomyelitis/discitis in the cervical or lumbar spine at this time.  Multilevel degenerative change of the cervical and lumbar spine.   4. Moderate size hiatal hernia and left pleural effusion.   This report was  flagged in Epic as abnormal.     Electronically signed by resident: Leif Glover   Date: 12/02/2020   Time: 04:58     Electronically signed by: Christy Mcgee MD   Date: 12/02/2020   Time: 06:26   Imaging History    2020  Date Procedure Name Status Accession Number Location   12/03/20 03:16 PM CT Cervical Spine Without Contrast Final [id number] [facility]   12/02/20 04:03 AM MRI SPINE CERVICAL-THORACIC-LUMBAR W W/O CONTRAST [id number]   11/30/20 02:10 PM CT Thoracic Spine Without Contrast Final [id number] [facility]   11/09/20 12:08 PM MRI Thoracic Spine W WO Cont Final [id number] [facility]

## 2020-12-04 NOTE — PLAN OF CARE
Problem: Skin Injury Risk Increased  Goal: Skin Health and Integrity  Outcome: Ongoing, Progressing  Intervention: Optimize Skin Protection  Flowsheets (Taken 12/4/2020 1480)  Pressure Reduction Techniques:   frequent weight shift encouraged   weight shift assistance provided  Skin Protection:   adhesive use limited   incontinence pads utilized   tubing/devices free from skin contact  Head of Bed (HOB): HOB at 20-30 degrees    Plan of care reviewed with patient. PRN pain medication given as ordered. No acute changes overnight. Safety maintained, call light in reach, bed in lowest position, will continue to monitor.    Pt aware

## 2020-12-04 NOTE — PLAN OF CARE
Patient is tentatively scheduled for a T9-T10 corpectomy/fusion on 12/8.         12/04/20 1416   Discharge Reassessment   Assessment Type Discharge Planning Reassessment   Provided patient/caregiver education on the expected discharge date and the discharge plan Yes   Discharge Plan A Skilled Nursing Facility   Discharge Plan B Rehab   DME Needed Upon Discharge  other (see comments)  (TBD)   Anticipated Discharge Disposition SNF   Post-Acute Status   Post-Acute Authorization Placement   Post-Acute Placement Status Awaiting Internal Medical Clearance   Discharge Delays None known at this time

## 2020-12-04 NOTE — SUBJECTIVE & OBJECTIVE
Interval History: NAEON. Vitals and labs stable. Pt reports continued mid-back pain, somewhat improved on current pain regimen. Denies weakness, numbness/paresthesias, and b/b dysfunction. Denies neck pain. CT C-spine stable with degenerative changes, no significant prevertebral swelling. Pending OR Tuesday.    Medications:  Continuous Infusions:  Scheduled Meds:   acetaminophen  1,000 mg Oral Q8H    ceftaroline fosamil  600 mg Intravenous Q8H    donepeziL  10 mg Oral QHS    DULoxetine  60 mg Oral Daily    heparin (porcine)  5,000 Units Subcutaneous Q8H    memantine  10 mg Oral BID    methocarbamoL  500 mg Oral QID    metoprolol succinate  50 mg Oral Daily    mupirocin   Nasal BID    pantoprazole  40 mg Oral Before breakfast    pravastatin  40 mg Oral QHS    senna-docusate 8.6-50 mg  1 tablet Oral BID     PRN Meds:acetaminophen, albuterol-ipratropium, dextrose 50%, dextrose 50%, glucagon (human recombinant), glucose, glucose, glucose, insulin aspart U-100, ondansetron, oxyCODONE, oxyCODONE     Review of Systems   Constitutional: Negative for chills and fever.   Gastrointestinal: Negative for nausea and vomiting.   Genitourinary: Negative for difficulty urinating and dysuria.   Musculoskeletal: Positive for back pain. Negative for neck pain.   Neurological: Negative for weakness and numbness.   Psychiatric/Behavioral: Negative for behavioral problems and confusion.     Objective:     Weight: 125.4 kg (276 lb 7.3 oz)  Body mass index is 39.67 kg/m².  Vital Signs (Most Recent):  Temp: 97.8 °F (36.6 °C) (12/04/20 1533)  Pulse: 70 (12/04/20 1533)  Resp: 17 (12/04/20 1533)  BP: (!) 122/56 (12/04/20 1533)  SpO2: (!) 92 % (12/04/20 1533) Vital Signs (24h Range):  Temp:  [97.8 °F (36.6 °C)-98.6 °F (37 °C)] 97.8 °F (36.6 °C)  Pulse:  [61-70] 70  Resp:  [17-18] 17  SpO2:  [92 %-99 %] 92 %  BP: (122-149)/(56-77) 122/56     Date 12/04/20 0700 - 12/05/20 0659   Shift [id number] 1564-8033 2170-0659 24 Hour Total    INTAKE   P.O. 360   360   Shift Total(mL/kg) 360(2.9)   360(2.9)   OUTPUT   Urine(mL/kg/hr) 400(0.4)   400   Shift Total(mL/kg) 400(3.2)   400(3.2)   Weight (kg) 125.4 125.4 125.4 125.4                   Female External Urinary Catheter 12/01/20 2200 (Active)   Skin no redness;no breakdown 12/04/20 0843   Tolerance no signs/symptoms of discomfort 12/04/20 0843   Suction Continuous suction at 60 mmHg 12/04/20 0843   Date of last wick change 12/03/20 12/03/20 2200   Time of last wick change 2235 12/03/20 2200   Output (mL) 400 mL 12/04/20 1309       Neurosurgery Physical Exam    General: well developed, well nourished, no distress.   Head: normocephalic, atraumatic  Neck: No tracheal deviation. No palpable masses. Full ROM.   Neurologic: Alert and oriented. Thought content appropriate.  GCS: Motor: 6/Verbal: 5/Eyes: 4 GCS Total: 15  Mental Status: Awake, Alert, Oriented x 4  Language: No aphasia  Speech: No dysarthria  Cranial nerves: face symmetric, tongue midline, CN II-XII grossly intact.   Eyes: pupils equal, round, reactive to light with accomodation, EOMI.  Ears: No drainage.   Pulmonary: normal respirations, no signs of respiratory distress  Abdomen: soft, non-distended, not tender to palpation  Sensory: intact to light touch throughout  Motor Strength: Moves all extremities spontaneously with good tone.  Pain limited weakness in proximal BLE limiting exam, limited also by body habitus. No abnormal movements seen.      Strength   Deltoids Triceps Biceps Wrist Extension Wrist Flexion Hand    Upper: R 5/5 5/5 5/5 5/5 5/5 5/5     L 5/5 5/5 5/5 5/5 5/5 5/5       Iliopsoas Quadriceps Knee  Flexion Tibialis  anterior Gastro- cnemius EHL   Lower: R 3/5 3+/5 3+/5 5/5 5/5 5/5     L 4-/5 4-/5 4-/5 5/5 5/5 5/5      Stein: absent  Clonus: absent  DTR: 2+ and symmetric in brachioradialis, biceps, patellar  Vascular: No LE edema.   Skin: Skin is warm, dry and intact.      Significant Labs:  Recent Labs   Lab  12/03/20  0249 12/04/20  0540   GLU 78 89    137   K 3.6 3.7    100   CO2 30* 26   BUN 12 12   CREATININE 1.0 1.1   CALCIUM 10.0 9.8     Recent Labs   Lab 12/03/20  0250 12/04/20  0540   WBC 6.90 6.58   HGB 10.3* 10.2*   HCT 35.1* 35.3*    298     No results for input(s): LABPT, INR, APTT in the last 48 hours.  Microbiology Results (last 7 days)     Procedure Component Value Units Date/Time    Blood culture [[id number]] Collected: 12/02/20 0020    Order Status: Completed Specimen: Blood Updated: 12/04/20 0613     Blood Culture, Routine No Growth to date      No Growth to date      No Growth to date        All pertinent labs from the last 24 hours have been reviewed.    Significant Diagnostics:  I have reviewed and interpreted all pertinent imaging results/findings within the past 24 hours.

## 2020-12-05 PROCEDURE — 25000003 PHARM REV CODE 250: Performed by: STUDENT IN AN ORGANIZED HEALTH CARE EDUCATION/TRAINING PROGRAM

## 2020-12-05 PROCEDURE — 20600001 HC STEP DOWN PRIVATE ROOM

## 2020-12-05 PROCEDURE — 63600175 PHARM REV CODE 636 W HCPCS: Mod: JG | Performed by: PHYSICIAN ASSISTANT

## 2020-12-05 PROCEDURE — 25000003 PHARM REV CODE 250: Performed by: PHYSICIAN ASSISTANT

## 2020-12-05 PROCEDURE — 63600175 PHARM REV CODE 636 W HCPCS: Performed by: PHYSICIAN ASSISTANT

## 2020-12-05 RX ADMIN — CEFTAROLINE FOSAMIL 600 MG: 600 POWDER, FOR SOLUTION INTRAVENOUS at 09:12

## 2020-12-05 RX ADMIN — DONEPEZIL HYDROCHLORIDE 10 MG: 10 TABLET ORAL at 09:12

## 2020-12-05 RX ADMIN — OXYCODONE HYDROCHLORIDE 5 MG: 5 TABLET ORAL at 09:12

## 2020-12-05 RX ADMIN — MUPIROCIN: 20 OINTMENT TOPICAL at 09:12

## 2020-12-05 RX ADMIN — METHOCARBAMOL 500 MG: 500 TABLET ORAL at 01:12

## 2020-12-05 RX ADMIN — MEMANTINE HYDROCHLORIDE 10 MG: 10 TABLET ORAL at 09:12

## 2020-12-05 RX ADMIN — SENNOSIDES AND DOCUSATE SODIUM 1 TABLET: 8.6; 5 TABLET ORAL at 09:12

## 2020-12-05 RX ADMIN — CEFTAROLINE FOSAMIL 600 MG: 600 POWDER, FOR SOLUTION INTRAVENOUS at 01:12

## 2020-12-05 RX ADMIN — METHOCARBAMOL 500 MG: 500 TABLET ORAL at 05:12

## 2020-12-05 RX ADMIN — METHOCARBAMOL 500 MG: 500 TABLET ORAL at 09:12

## 2020-12-05 RX ADMIN — ACETAMINOPHEN 1000 MG: 500 TABLET ORAL at 06:12

## 2020-12-05 RX ADMIN — ACETAMINOPHEN 1000 MG: 500 TABLET ORAL at 09:12

## 2020-12-05 RX ADMIN — HEPARIN SODIUM 5000 UNITS: 5000 INJECTION INTRAVENOUS; SUBCUTANEOUS at 09:12

## 2020-12-05 RX ADMIN — HEPARIN SODIUM 5000 UNITS: 5000 INJECTION INTRAVENOUS; SUBCUTANEOUS at 01:12

## 2020-12-05 RX ADMIN — OXYCODONE HYDROCHLORIDE 10 MG: 10 TABLET ORAL at 06:12

## 2020-12-05 RX ADMIN — HEPARIN SODIUM 5000 UNITS: 5000 INJECTION INTRAVENOUS; SUBCUTANEOUS at 06:12

## 2020-12-05 RX ADMIN — DULOXETINE HYDROCHLORIDE 60 MG: 60 CAPSULE, DELAYED RELEASE ORAL at 09:12

## 2020-12-05 RX ADMIN — PANTOPRAZOLE SODIUM 40 MG: 40 TABLET, DELAYED RELEASE ORAL at 06:12

## 2020-12-05 RX ADMIN — PRAVASTATIN SODIUM 40 MG: 40 TABLET ORAL at 09:12

## 2020-12-05 RX ADMIN — METOPROLOL SUCCINATE 50 MG: 50 TABLET, EXTENDED RELEASE ORAL at 09:12

## 2020-12-05 NOTE — PLAN OF CARE
Problem: Adult Inpatient Plan of Care  Goal: Optimal Comfort and Wellbeing  Outcome: Ongoing, Progressing  Intervention: Provide Person-Centered Care  Flowsheets (Taken 12/4/2020 2413)  Trust Relationship/Rapport:   care explained   choices provided   questions encouraged  Plan of care was reviewed with the pt. AAOx4. Vitals were stable. Pain management was reviewed with the pt. PRN pain meds were given. I and Os were documented. Pt is still on 2L NC. No major changes through out the day. Safety precautions were in placed.

## 2020-12-06 ENCOUNTER — ANESTHESIA EVENT (OUTPATIENT)
Dept: SURGERY | Facility: HOSPITAL | Age: 70
DRG: 453 | End: 2020-12-06
Payer: MEDICARE

## 2020-12-06 LAB
ANION GAP SERPL CALC-SCNC: 7 MMOL/L (ref 8–16)
BASOPHILS # BLD AUTO: 0.03 K/UL (ref 0–0.2)
BASOPHILS NFR BLD: 0.4 % (ref 0–1.9)
BUN SERPL-MCNC: 12 MG/DL (ref 8–23)
CALCIUM SERPL-MCNC: 9.8 MG/DL (ref 8.7–10.5)
CHLORIDE SERPL-SCNC: 101 MMOL/L (ref 95–110)
CO2 SERPL-SCNC: 29 MMOL/L (ref 23–29)
CREAT SERPL-MCNC: 1 MG/DL (ref 0.5–1.4)
DIFFERENTIAL METHOD: ABNORMAL
EOSINOPHIL # BLD AUTO: 0.3 K/UL (ref 0–0.5)
EOSINOPHIL NFR BLD: 3 % (ref 0–8)
ERYTHROCYTE [DISTWIDTH] IN BLOOD BY AUTOMATED COUNT: 16.8 % (ref 11.5–14.5)
EST. GFR  (AFRICAN AMERICAN): >60 ML/MIN/1.73 M^2
EST. GFR  (NON AFRICAN AMERICAN): 56.4 ML/MIN/1.73 M^2
GLUCOSE SERPL-MCNC: 88 MG/DL (ref 70–110)
HCT VFR BLD AUTO: 34.1 % (ref 37–48.5)
HGB BLD-MCNC: 9.9 G/DL (ref 12–16)
IMM GRANULOCYTES # BLD AUTO: 0.02 K/UL (ref 0–0.04)
IMM GRANULOCYTES NFR BLD AUTO: 0.2 % (ref 0–0.5)
LYMPHOCYTES # BLD AUTO: 1.8 K/UL (ref 1–4.8)
LYMPHOCYTES NFR BLD: 21.7 % (ref 18–48)
MCH RBC QN AUTO: 27.6 PG (ref 27–31)
MCHC RBC AUTO-ENTMCNC: 29 G/DL (ref 32–36)
MCV RBC AUTO: 95 FL (ref 82–98)
MONOCYTES # BLD AUTO: 0.9 K/UL (ref 0.3–1)
MONOCYTES NFR BLD: 11.3 % (ref 4–15)
NEUTROPHILS # BLD AUTO: 5.3 K/UL (ref 1.8–7.7)
NEUTROPHILS NFR BLD: 63.4 % (ref 38–73)
NRBC BLD-RTO: 0 /100 WBC
PLATELET # BLD AUTO: 305 K/UL (ref 150–350)
PMV BLD AUTO: 9.9 FL (ref 9.2–12.9)
POTASSIUM SERPL-SCNC: 3.8 MMOL/L (ref 3.5–5.1)
RBC # BLD AUTO: 3.59 M/UL (ref 4–5.4)
SODIUM SERPL-SCNC: 137 MMOL/L (ref 136–145)
WBC # BLD AUTO: 8.33 K/UL (ref 3.9–12.7)

## 2020-12-06 PROCEDURE — 25000003 PHARM REV CODE 250: Performed by: STUDENT IN AN ORGANIZED HEALTH CARE EDUCATION/TRAINING PROGRAM

## 2020-12-06 PROCEDURE — 36415 COLL VENOUS BLD VENIPUNCTURE: CPT

## 2020-12-06 PROCEDURE — 25000003 PHARM REV CODE 250: Performed by: PHYSICIAN ASSISTANT

## 2020-12-06 PROCEDURE — 63600175 PHARM REV CODE 636 W HCPCS: Mod: JG | Performed by: PHYSICIAN ASSISTANT

## 2020-12-06 PROCEDURE — 63600175 PHARM REV CODE 636 W HCPCS: Performed by: PHYSICIAN ASSISTANT

## 2020-12-06 PROCEDURE — 20600001 HC STEP DOWN PRIVATE ROOM

## 2020-12-06 PROCEDURE — 80048 BASIC METABOLIC PNL TOTAL CA: CPT

## 2020-12-06 PROCEDURE — 85025 COMPLETE CBC W/AUTO DIFF WBC: CPT

## 2020-12-06 RX ADMIN — CEFTAROLINE FOSAMIL 600 MG: 600 POWDER, FOR SOLUTION INTRAVENOUS at 02:12

## 2020-12-06 RX ADMIN — METHOCARBAMOL 500 MG: 500 TABLET ORAL at 09:12

## 2020-12-06 RX ADMIN — METHOCARBAMOL 500 MG: 500 TABLET ORAL at 05:12

## 2020-12-06 RX ADMIN — CEFTAROLINE FOSAMIL 600 MG: 600 POWDER, FOR SOLUTION INTRAVENOUS at 09:12

## 2020-12-06 RX ADMIN — DULOXETINE HYDROCHLORIDE 60 MG: 60 CAPSULE, DELAYED RELEASE ORAL at 09:12

## 2020-12-06 RX ADMIN — DONEPEZIL HYDROCHLORIDE 10 MG: 10 TABLET ORAL at 08:12

## 2020-12-06 RX ADMIN — HEPARIN SODIUM 5000 UNITS: 5000 INJECTION INTRAVENOUS; SUBCUTANEOUS at 09:12

## 2020-12-06 RX ADMIN — MUPIROCIN: 20 OINTMENT TOPICAL at 09:12

## 2020-12-06 RX ADMIN — SENNOSIDES AND DOCUSATE SODIUM 1 TABLET: 8.6; 5 TABLET ORAL at 09:12

## 2020-12-06 RX ADMIN — OXYCODONE HYDROCHLORIDE 10 MG: 10 TABLET ORAL at 04:12

## 2020-12-06 RX ADMIN — METOPROLOL SUCCINATE 50 MG: 50 TABLET, EXTENDED RELEASE ORAL at 09:12

## 2020-12-06 RX ADMIN — PANTOPRAZOLE SODIUM 40 MG: 40 TABLET, DELAYED RELEASE ORAL at 05:12

## 2020-12-06 RX ADMIN — METHOCARBAMOL 500 MG: 500 TABLET ORAL at 08:12

## 2020-12-06 RX ADMIN — HEPARIN SODIUM 5000 UNITS: 5000 INJECTION INTRAVENOUS; SUBCUTANEOUS at 01:12

## 2020-12-06 RX ADMIN — ACETAMINOPHEN 1000 MG: 500 TABLET ORAL at 01:12

## 2020-12-06 RX ADMIN — PRAVASTATIN SODIUM 40 MG: 40 TABLET ORAL at 08:12

## 2020-12-06 RX ADMIN — METHOCARBAMOL 500 MG: 500 TABLET ORAL at 01:12

## 2020-12-06 RX ADMIN — ACETAMINOPHEN 1000 MG: 500 TABLET ORAL at 09:12

## 2020-12-06 RX ADMIN — OXYCODONE HYDROCHLORIDE 10 MG: 10 TABLET ORAL at 09:12

## 2020-12-06 RX ADMIN — CEFTAROLINE FOSAMIL 600 MG: 600 POWDER, FOR SOLUTION INTRAVENOUS at 05:12

## 2020-12-06 RX ADMIN — HEPARIN SODIUM 5000 UNITS: 5000 INJECTION INTRAVENOUS; SUBCUTANEOUS at 05:12

## 2020-12-06 RX ADMIN — ACETAMINOPHEN 1000 MG: 500 TABLET ORAL at 05:12

## 2020-12-06 RX ADMIN — MEMANTINE HYDROCHLORIDE 10 MG: 10 TABLET ORAL at 08:12

## 2020-12-06 RX ADMIN — MEMANTINE HYDROCHLORIDE 10 MG: 10 TABLET ORAL at 09:12

## 2020-12-06 RX ADMIN — MUPIROCIN: 20 OINTMENT TOPICAL at 12:12

## 2020-12-06 NOTE — ASSESSMENT & PLAN NOTE
70 F with known MRSA bacteremia with thoracic osteomyelitis presenting as transfer from SNF after CT T spine concerning for increased erosion of T9/T10 vertebral bodies as well as bilateral T9 pedicle fractures:    Neurologically stable on exam.    - [place] [facility] [place]  - Preop labs reviewed  - MRI C/T/L W/WO shows known T9-T10 osteomyelitis/discitis with intervertebral disc space abscess and epidural phlegmon, causing some mass effect on spinal cord but no cord signal changes. Alignment grossly maintained.  - Plan for stabilization with T9-T10 corpectomy and fusion above/below. Tentatively scheduled for 12/8. Will obtain intraoperative cultures and pathology.  - TLSO brace at all times  - Activity: Bed rest with spinal precautions, log roll, HOB < 30 at all times.  - Pain control: scheduled Tylenol and Robaxin, Oxycodone PRN  - Preoperative Clearance:  consulted for preoperative risk assessment and clearance.   - RCRI 10.1% risk.  Patient is moderate risk for moderate risk surgery, benefits of surgery outweigh risk, ok to proceed from medicine standpoint.    - Recommend repeat TTE as outpatient given changes from 8/10 - 10/12. Pt may have had NSTEMI. Endocarditis felt less likely.  - H/o MRSA Bacteremia: Afebrile, no leukocytosis. ESR 97, CRP 12.4.    - ID following, appreciate assistance. Added ceftaroline. Send OR Cx's and pathology.  - CAD, s/p Cardiac Stent: MI in 2000 which required stent placements. Pt takes ASA and Plavix, last dose 11/30.   - Hold ASA/Plavix preoperatively  - Alzheimer's: Continue home memantine and donepezil  - HLD: Continue home pravastatin  -DVT prophylaxis: SHAYLA's, SCD's, SQH  -Bowel regimen: senna BID     Contact Neurosurgery with any questions, concerns, or neuro changes.    D/w Dr. Gongora

## 2020-12-06 NOTE — PROGRESS NOTES
[facility]  Neurosurgery  Progress Note    Subjective:     History of Present Illness: 70 y.o. female known to [facility] [place] with PMH of Alzheimer's dz, HTN, HLD, CAD, YOVANI, and morbid obesity who was recently hospitalized x2 for MRSA bacteremia complicated by pneumonia and possible UTI, treated with linozolid x8 days and cipro and discharged 10/1. She was then readmitted to [facility] [place] on 10/8 with severe back pain. MRI revealed T9-10 osteodiscitis and T8-10 epidural phlegmon with associated paraverterbral abscesses. Spine infection likely hematogenous from under-treated bacteremia. Patient had no neurologic deficits on exam, no indications for emergent neurosurgical intervention, plan was made for conservative non-surgical treatment. She underwent needle aspiration of T9-10 disc space on 10/16, cultures were negative although she had already been undergoing antibiotic treatment. Repeat BCx remained no growth and she remained afebrile and neurologically stable. Patient was discharged to [facility] on 10/20 with plan for Vancomycin IV x 8 weeks (estimated end date 12/3).    Patient presents today after interval imaging obtained 11/30 revealed worsening bony destruction, unstable T9 fracture. Transferred for [facility].     Post-Op Info:  Procedure(s) (LRB):  T9-T10 corpectomy, posterior instrumented fusion T7-T12. Aero. Globus, Depuy. Neuromonitoring. (N/A)         Interval History: NAEO. AFVSS. Neuro stable. No complaints this morning. Plan for OR Tuesday.    Medications:  Continuous Infusions:  Scheduled Meds:   acetaminophen  1,000 mg Oral Q8H    ceftaroline fosamil  600 mg Intravenous Q8H    donepeziL  10 mg Oral QHS    DULoxetine  60 mg Oral Daily    heparin (porcine)  5,000 Units Subcutaneous Q8H    memantine  10 mg Oral BID    methocarbamoL  500 mg Oral QID    metoprolol succinate  50 mg Oral Daily    mupirocin   Nasal BID    pantoprazole  40 mg Oral Before breakfast    pravastatin  40  mg Oral QHS    senna-docusate 8.6-50 mg  1 tablet Oral BID     PRN Meds:acetaminophen, albuterol-ipratropium, dextrose 50%, dextrose 50%, glucagon (human recombinant), glucose, glucose, glucose, insulin aspart U-100, ondansetron, oxyCODONE, oxyCODONE     Review of Systems   Constitutional: Negative for chills and fever.   Gastrointestinal: Negative for nausea and vomiting.   Genitourinary: Negative for difficulty urinating and dysuria.   Musculoskeletal: Positive for back pain. Negative for neck pain.   Neurological: Negative for weakness and numbness.   Psychiatric/Behavioral: Negative for behavioral problems and confusion.     Objective:     Weight: 125.4 kg (276 lb 7.3 oz)  Body mass index is 39.67 kg/m².  Vital Signs (Most Recent):  Temp: 97.8 °F (36.6 °C) (12/05/20 0900)  Pulse: 62 (12/05/20 1700)  Resp: 18 (12/05/20 1700)  BP: (!) 128/57 (12/05/20 1700)  SpO2: 98 % (12/05/20 1700) Vital Signs (24h Range):  Temp:  [97.7 °F (36.5 °C)-98.2 °F (36.8 °C)] 97.8 °F (36.6 °C)  Pulse:  [56-64] 62  Resp:  [14-18] 18  SpO2:  [94 %-98 %] 98 %  BP: ()/(43-61) 128/57     Date 12/05/20 0700 - 12/06/20 0659   Shift 3072-6142 4446-1392 8725-5032 24 Hour Total   INTAKE   P.O.  420  420   Shift Total(mL/kg)  420(3.3)  420(3.3)   OUTPUT   Urine(mL/kg/hr)  450  450   Shift Total(mL/kg)  450(3.6)  450(3.6)   Weight (kg) 125.4 125.4 125.4 125.4                   Female External Urinary Catheter 12/01/20 2200 (Active)   Skin no redness;no breakdown 12/04/20 0843   Tolerance no signs/symptoms of discomfort 12/04/20 0843   Suction Continuous suction at 60 mmHg 12/04/20 0843   Date of last wick change 12/03/20 12/03/20 2200   Time of last wick change 2235 12/03/20 2200   Output (mL) 400 mL 12/04/20 1309       Neurosurgery Physical Exam      General: well developed, well nourished, no distress.   Head: normocephalic, atraumatic  Neck: No tracheal deviation. No palpable masses. Full ROM.   Neurologic: Alert and oriented. Thought  content appropriate.  GCS: Motor: 6/Verbal: 5/Eyes: 4 GCS Total: 15  Mental Status: Awake, Alert, Oriented x 4  Language: No aphasia  Speech: No dysarthria  Cranial nerves: face symmetric, tongue midline, CN II-XII grossly intact.   Eyes: pupils equal, round, reactive to light with accomodation, EOMI.  Ears: No drainage.   Pulmonary: normal respirations, no signs of respiratory distress  Abdomen: soft, non-distended, not tender to palpation  Sensory: intact to light touch throughout  Motor Strength: Moves all extremities spontaneously with good tone.  Pain limited weakness in proximal BLE limiting exam, limited also by body habitus. No abnormal movements seen.      Strength   Deltoids Triceps Biceps Wrist Extension Wrist Flexion Hand    Upper: R 5/5 5/5 5/5 5/5 5/5 5/5     L 5/5 5/5 5/5 5/5 5/5 5/5       Iliopsoas Quadriceps Knee  Flexion Tibialis  anterior Gastro- cnemius EHL   Lower: R 3/5 3+/5 3+/5 5/5 5/5 5/5     L 4-/5 4-/5 4-/5 5/5 5/5 5/5      Stein: absent  Clonus: absent  DTR: 2+ and symmetric in brachioradialis, biceps, patellar  Vascular: No LE edema.   Skin: Skin is warm, dry and intact.      Significant Labs:  Recent Labs   Lab 12/04/20  0540   GLU 89      K 3.7      CO2 26   BUN 12   CREATININE 1.1   CALCIUM 9.8     Recent Labs   Lab 12/04/20  0540   WBC 6.58   HGB 10.2*   HCT 35.3*        No results for input(s): LABPT, INR, APTT in the last 48 hours.  Microbiology Results (last 7 days)     Procedure Component Value Units Date/Time    Blood culture [[id number]] Collected: 12/02/20 0020    Order Status: Completed Specimen: Blood Updated: 12/05/20 0613     Blood Culture, Routine No Growth to date      No Growth to date      No Growth to date      No Growth to date        All pertinent labs from the last 24 hours have been reviewed.    Significant Diagnostics:  I have reviewed and interpreted all pertinent imaging results/findings within the past 24 hours.    Assessment/Plan:      MRSA bacteremia  70 F with known MRSA bacteremia with thoracic osteomyelitis presenting as transfer from SNF after CT T spine concerning for increased erosion of T9/T10 vertebral bodies as well as bilateral T9 pedicle fractures:    Neurologically stable on exam.    - Admitted to [facility] [place]  - q4 neurochecks  - Preop labs reviewed  - MRI C/T/L W/WO shows known T9-T10 osteomyelitis/discitis with intervertebral disc space abscess and epidural phlegmon, causing some mass effect on spinal cord but no cord signal changes. Alignment grossly maintained.  - Plan for stabilization with T9-T10 corpectomy and fusion above/below. Tentatively scheduled for 12/8. Will obtain intraoperative cultures and pathology.  - TLSO brace at all times  - Activity: Bed rest with spinal precautions, log roll, HOB < 30 at all times.  - Pain control: scheduled Tylenol and Robaxin, Oxycodone PRN  - Preoperative Clearance:  consulted for preoperative risk assessment and clearance.   - RCRI 10.1% risk.  Patient is moderate risk for moderate risk surgery, benefits of surgery outweigh risk, ok to proceed from medicine standpoint.    - Recommend repeat TTE as outpatient given changes from 8/10 - 10/12. Pt may have had NSTEMI. Endocarditis felt less likely.  - H/o MRSA Bacteremia: Afebrile, no leukocytosis. ESR 97, CRP 12.4.    - ID following, appreciate assistance. Added ceftaroline. Send OR Cx's and pathology.  - CAD, s/p Cardiac Stent: MI in 2000 which required stent placements. Pt takes ASA and Plavix, last dose 11/30.   - Hold ASA/Plavix preoperatively  - Alzheimer's: Continue home memantine and donepezil  - HLD: Continue home pravastatin  -DVT prophylaxis: SHAYLA's, SCD's, SQH  -Bowel regimen: senna BID     Contact Neurosurgery with any questions, concerns, or neuro changes.    D/w Dr. Fransisca Baeza MD  Neurosurgery  [facility]

## 2020-12-06 NOTE — ASSESSMENT & PLAN NOTE
70 F with known MRSA bacteremia with thoracic osteomyelitis presenting as transfer from SNF after CT T spine concerning for increased erosion of T9/T10 vertebral bodies as well as bilateral T9 pedicle fractures:    Neurologically stable on exam.    - Admitted to [facility] [place]  - q4 neurochecks  - Preop labs reviewed  - MRI C/T/L W/WO shows known T9-T10 osteomyelitis/discitis with intervertebral disc space abscess and epidural phlegmon, causing some mass effect on spinal cord but no cord signal changes. Alignment grossly maintained.  - Plan for stabilization with T9-T10 corpectomy and fusion above/below. Tentatively scheduled for 12/8. Will obtain intraoperative cultures and pathology.  - TLSO brace at all times  - Activity: Bed rest with spinal precautions, log roll, HOB < 30 at all times.  - Pain control: scheduled Tylenol and Robaxin, Oxycodone PRN  - Preoperative Clearance:  consulted for preoperative risk assessment and clearance.   - RCRI 10.1% risk.  Patient is moderate risk for moderate risk surgery, benefits of surgery outweigh risk, ok to proceed from medicine standpoint.    - Recommend repeat TTE as outpatient given changes from 8/10 - 10/12. Pt may have had  NSTEMI. Endocarditis felt less likely.  - H/o MRSA Bacteremia: Afebrile, no leukocytosis. ESR 97, CRP 12.4.    - ID following, appreciate assistance. Continue ceftaroline. Send OR Cx's and pathology.  - CAD, s/p Cardiac Stent: MI in 2000 which required stent placements. Pt takes ASA and Plavix, last dose  11/30.   - Hold ASA/Plavix preoperatively  - Alzheimer's: Continue home memantine and donepezil  - HLD: Continue home pravastatin  -DVT prophylaxis: SHAYLA's, SCD's, SQH  -Bowel regimen: senna BID     Contact Neurosurgery with any questions, concerns, or neuro changes.    D/w Dr. Gongora

## 2020-12-06 NOTE — SUBJECTIVE & OBJECTIVE
Interval History: NAEO. AFVSS. Neuro stable. No complaints this morning. Plan for OR Tuesday. Blood cultures with NGTD. Continue ceftaroline. Will get OR cultures. Continue SQH.    Medications:  Continuous Infusions:  Scheduled Meds:   acetaminophen  1,000 mg Oral Q8H    ceftaroline fosamil  600 mg Intravenous Q8H    donepeziL  10 mg Oral QHS    DULoxetine  60 mg Oral Daily    heparin (porcine)  5,000 Units Subcutaneous Q8H    memantine  10 mg Oral BID    methocarbamoL  500 mg Oral QID    metoprolol succinate  50 mg Oral Daily    mupirocin   Nasal BID    pantoprazole  40 mg Oral Before breakfast    pravastatin  40 mg Oral QHS    senna-docusate 8.6-50 mg  1 tablet Oral BID     PRN Meds:acetaminophen, albuterol-ipratropium, dextrose 50%, dextrose 50%, glucagon (human recombinant), glucose, glucose, glucose, insulin aspart U-100, ondansetron, oxyCODONE, oxyCODONE     Review of Systems   Constitutional: Negative for chills and fever.   Gastrointestinal: Negative for nausea and vomiting.   Genitourinary: Negative for difficulty urinating and dysuria.   Musculoskeletal: Positive for back pain. Negative for neck pain.   Neurological: Negative for weakness and numbness.   Psychiatric/Behavioral: Negative for behavioral problems and confusion.     Objective:     Weight: 125.4 kg (276 lb 7.3 oz)  Body mass index is 39.67 kg/m².  Vital Signs (Most Recent):  Temp: 98.4 °F (36.9 °C) (12/06/20 0458)  Pulse: (!) 56 (12/06/20 0845)  Resp: (!) 22 (12/06/20 0845)  BP: (!) 143/82 (12/06/20 0845)  SpO2: 97 % (12/06/20 0845) Vital Signs (24h Range):  Temp:  [98.1 °F (36.7 °C)-98.4 °F (36.9 °C)] 98.4 °F (36.9 °C)  Pulse:  [56-62] 56  Resp:  [15-22] 22  SpO2:  [93 %-98 %] 97 %  BP: (101-143)/(52-82) 143/82                     Female External Urinary Catheter 12/01/20 2200 (Active)   Skin no redness;no breakdown 12/04/20 0843   Tolerance no signs/symptoms of discomfort 12/04/20 0843   Suction Continuous suction at 60 mmHg  12/04/20 0843   Date of last wick change 12/03/20 12/03/20 2200   Time of last wick change 2235 12/03/20 2200   Output (mL) 400 mL 12/04/20 1309       Neurosurgery Physical Exam      General: well developed, well nourished, no distress.   Head: normocephalic, atraumatic  Neck: No tracheal deviation. No palpable masses. Full ROM.   Neurologic: Alert and oriented. Thought content appropriate.  GCS: Motor: 6/Verbal: 5/Eyes: 4 GCS Total: 15  Mental Status: Awake, Alert, Oriented x 4  Language: No aphasia  Speech: No dysarthria  Cranial nerves: face symmetric, tongue midline, CN II-XII grossly intact.   Eyes: pupils equal, round, reactive to light with accomodation, EOMI.  Ears: No drainage.   Pulmonary: normal respirations, no signs of respiratory distress  Abdomen: soft, non-distended, not tender to palpation  Sensory: intact to light touch throughout  Motor Strength: Moves all extremities spontaneously with good tone.  Pain limited weakness in proximal BLE limiting exam, limited also by body habitus. No abnormal movements seen.      Strength   Deltoids Triceps Biceps Wrist Extension Wrist Flexion Hand    Upper: R 5/5 5/5 5/5 5/5 5/5 5/5     L 5/5 5/5 5/5 5/5 5/5 5/5       Iliopsoas Quadriceps Knee  Flexion Tibialis  anterior Gastro- cnemius EHL   Lower: R 3/5 3+/5 3+/5 5/5 5/5 5/5     L 4-/5 4-/5 4-/5 5/5 5/5 5/5      Stein: absent  Clonus: absent  DTR: 2+ and symmetric in brachioradialis, biceps, patellar  Vascular: No LE edema.   Skin: Skin is warm, dry and intact.      Significant Labs:  Recent Labs   Lab 12/06/20  0707   GLU 88      K 3.8      CO2 29   BUN 12   CREATININE 1.0   CALCIUM 9.8     Recent Labs   Lab 12/06/20  0707   WBC 8.33   HGB 9.9*   HCT 34.1*        No results for input(s): LABPT, INR, APTT in the last 48 hours.  Microbiology Results (last 7 days)     Procedure Component Value Units Date/Time    Blood culture [[id number]] Collected: 12/02/20 0020    Order Status: Completed  Specimen: Blood Updated: 12/06/20 0612     Blood Culture, Routine No Growth to date      No Growth to date      No Growth to date      No Growth to date      No Growth to date        All pertinent labs from the last 24 hours have been reviewed.    Significant Diagnostics:  I have reviewed and interpreted all pertinent imaging results/findings within the past 24 hours.

## 2020-12-06 NOTE — SUBJECTIVE & OBJECTIVE
Interval History: NAEO. AFVSS. Neuro stable. No complaints this morning. Plan for OR Tuesday.    Medications:  Continuous Infusions:  Scheduled Meds:   acetaminophen  1,000 mg Oral Q8H    ceftaroline fosamil  600 mg Intravenous Q8H    donepeziL  10 mg Oral QHS    DULoxetine  60 mg Oral Daily    heparin (porcine)  5,000 Units Subcutaneous Q8H    memantine  10 mg Oral BID    methocarbamoL  500 mg Oral QID    metoprolol succinate  50 mg Oral Daily    mupirocin   Nasal BID    pantoprazole  40 mg Oral Before breakfast    pravastatin  40 mg Oral QHS    senna-docusate 8.6-50 mg  1 tablet Oral BID     PRN Meds:acetaminophen, albuterol-ipratropium, dextrose 50%, dextrose 50%, glucagon (human recombinant), glucose, glucose, glucose, insulin aspart U-100, ondansetron, oxyCODONE, oxyCODONE     Review of Systems   Constitutional: Negative for chills and fever.   Gastrointestinal: Negative for nausea and vomiting.   Genitourinary: Negative for difficulty urinating and dysuria.   Musculoskeletal: Positive for back pain. Negative for neck pain.   Neurological: Negative for weakness and numbness.   Psychiatric/Behavioral: Negative for behavioral problems and confusion.     Objective:     Weight: 125.4 kg (276 lb 7.3 oz)  Body mass index is 39.67 kg/m².  Vital Signs (Most Recent):  Temp: 97.8 °F (36.6 °C) (12/05/20 0900)  Pulse: 62 (12/05/20 1700)  Resp: 18 (12/05/20 1700)  BP: (!) 128/57 (12/05/20 1700)  SpO2: 98 % (12/05/20 1700) Vital Signs (24h Range):  Temp:  [97.7 °F (36.5 °C)-98.2 °F (36.8 °C)] 97.8 °F (36.6 °C)  Pulse:  [56-64] 62  Resp:  [14-18] 18  SpO2:  [94 %-98 %] 98 %  BP: ()/(43-61) 128/57     Date 12/05/20 0700 - 12/06/20 0659   Shift 5639-9284 7813-6273 5646-3916 24 Hour Total   INTAKE   P.O.  420  420   Shift Total(mL/kg)  420(3.3)  420(3.3)   OUTPUT   Urine(mL/kg/hr)  450  450   Shift Total(mL/kg)  450(3.6)  450(3.6)   Weight (kg) 125.4 125.4 125.4 125.4                   Female External Urinary  Catheter 12/01/20 2200 (Active)   Skin no redness;no breakdown 12/04/20 0843   Tolerance no signs/symptoms of discomfort 12/04/20 0843   Suction Continuous suction at 60 mmHg 12/04/20 0843   Date of last wick change 12/03/20 12/03/20 2200   Time of last wick change 2235 12/03/20 2200   Output (mL) 400 mL 12/04/20 1309       Neurosurgery Physical Exam      General: well developed, well nourished, no distress.   Head: normocephalic, atraumatic  Neck: No tracheal deviation. No palpable masses. Full ROM.   Neurologic: Alert and oriented. Thought content appropriate.  GCS: Motor: 6/Verbal: 5/Eyes: 4 GCS Total: 15  Mental Status: Awake, Alert, Oriented x 4  Language: No aphasia  Speech: No dysarthria  Cranial nerves: face symmetric, tongue midline, CN II-XII grossly intact.   Eyes: pupils equal, round, reactive to light with accomodation, EOMI.  Ears: No drainage.   Pulmonary: normal respirations, no signs of respiratory distress  Abdomen: soft, non-distended, not tender to palpation  Sensory: intact to light touch throughout  Motor Strength: Moves all extremities spontaneously with good tone.  Pain limited weakness in proximal BLE limiting exam, limited also by body habitus. No abnormal movements seen.      Strength   Deltoids Triceps Biceps Wrist Extension Wrist Flexion Hand    Upper: R 5/5 5/5 5/5 5/5 5/5 5/5     L 5/5 5/5 5/5 5/5 5/5 5/5       Iliopsoas Quadriceps Knee  Flexion Tibialis  anterior Gastro- cnemius EHL   Lower: R 3/5 3+/5 3+/5 5/5 5/5 5/5     L 4-/5 4-/5 4-/5 5/5 5/5 5/5      Stein: absent  Clonus: absent  DTR: 2+ and symmetric in brachioradialis, biceps, patellar  Vascular: No LE edema.   Skin: Skin is warm, dry and intact.      Significant Labs:  Recent Labs   Lab 12/04/20  0540   GLU 89      K 3.7      CO2 26   BUN 12   CREATININE 1.1   CALCIUM 9.8     Recent Labs   Lab 12/04/20  0540   WBC 6.58   HGB 10.2*   HCT 35.3*        No results for input(s): LABPT, INR, APTT in the  last 48 hours.  Microbiology Results (last 7 days)     Procedure Component Value Units Date/Time    Blood culture [[id number]] Collected: 12/02/20 0020    Order Status: Completed Specimen: Blood Updated: 12/05/20 0613     Blood Culture, Routine No Growth to date      No Growth to date      No Growth to date      No Growth to date        All pertinent labs from the last 24 hours have been reviewed.    Significant Diagnostics:  I have reviewed and interpreted all pertinent imaging results/findings within the past 24 hours.

## 2020-12-06 NOTE — PLAN OF CARE
ID Note    Patient not seen today.  Chart reviewed.   Afebrile, hemodynamically and neurologically stable.  Plan for OR on 12/8    Continue IV ceftaroline.  Will follow up Monday.   Call me at [phone] before then with any questions/concerns.     Thank you.   Please call for any questions or concerns.  CONSTANZA Cruz, ANP-C  [phone] pager, [place] [phone]

## 2020-12-06 NOTE — PROGRESS NOTES
[facility]  Neurosurgery  Progress Note    Subjective:     History of Present Illness: 70 y.o. female known to [facility] with PMH of Alzheimer's dz, HTN, HLD, CAD, YOVANI, and morbid obesity who was recently hospitalized x2 for MRSA bacteremia complicated by pneumonia and possible UTI, treated with linozolid x8 days and cipro and discharged 10/1. She was then readmitted to [facility] on 10/8 with severe back pain. MRI revealed T9-10 osteodiscitis and T8-10 epidural phlegmon with associated paraverterbral abscesses. Spine infection likely hematogenous from under-treated bacteremia. Patient had no neurologic deficits on exam, no indications for emergent neurosurgical intervention, plan was made for conservative non-surgical treatment. She underwent needle aspiration of T9-10 disc space on 10/16, cultures were negative although she had already been undergoing antibiotic treatment. Repeat BCx remained no growth and she remained afebrile and neurologically stable. Patient was discharged to [facility] on 10/20 with plan for Vancomycin IV x 8 weeks (estimated end date 12/3).    Patient presents today after interval imaging obtained 11/30 revealed worsening bony destruction, unstable T9 fracture. Transferred for [facility].     Post-Op Info:  Procedure(s) (LRB):  T9-T10 corpectomy, posterior instrumented fusion T7-T12. Aero. Globus, Depuy. Neuromonitoring. (N/A)         Interval History: NAEO. AFVSS. Neuro stable. No complaints this morning. Plan for OR Tuesday. Blood cultures with NGTD. Continue ceftaroline. Will get OR cultures. Continue SQH.    Medications:  Continuous Infusions:  Scheduled Meds:   acetaminophen  1,000 mg Oral Q8H    ceftaroline fosamil  600 mg Intravenous Q8H    donepeziL  10 mg Oral QHS    DULoxetine  60 mg Oral Daily    heparin (porcine)  5,000 Units Subcutaneous Q8H    memantine  10 mg Oral BID    methocarbamoL  500 mg Oral QID    metoprolol succinate  50 mg Oral Daily     mupirocin   Nasal BID    pantoprazole  40 mg Oral Before breakfast    pravastatin  40 mg Oral QHS    senna-docusate 8.6-50 mg  1 tablet Oral BID     PRN Meds:acetaminophen, albuterol-ipratropium, dextrose 50%, dextrose 50%, glucagon (human recombinant), glucose, glucose, glucose, insulin aspart U-100, ondansetron, oxyCODONE, oxyCODONE     Review of Systems   Constitutional: Negative for chills and fever.   Gastrointestinal: Negative for nausea and vomiting.   Genitourinary: Negative for difficulty urinating and dysuria.   Musculoskeletal: Positive for back pain. Negative for neck pain.   Neurological: Negative for weakness and numbness.   Psychiatric/Behavioral: Negative for behavioral problems and confusion.     Objective:     Weight: 125.4 kg (276 lb 7.3 oz)  Body mass index is 39.67 kg/m².  Vital Signs (Most Recent):  Temp: 98.4 °F (36.9 °C) (12/06/20 0458)  Pulse: (!) 56 (12/06/20 0845)  Resp: (!) 22 (12/06/20 0845)  BP: (!) 143/82 (12/06/20 0845)  SpO2: 97 % (12/06/20 0845) Vital Signs (24h Range):  Temp:  [98.1 °F (36.7 °C)-98.4 °F (36.9 °C)] 98.4 °F (36.9 °C)  Pulse:  [56-62] 56  Resp:  [15-22] 22  SpO2:  [93 %-98 %] 97 %  BP: (101-143)/(52-82) 143/82                     Female External Urinary Catheter 12/01/20 2200 (Active)   Skin no redness;no breakdown 12/04/20 0843   Tolerance no signs/symptoms of discomfort 12/04/20 0843   Suction Continuous suction at 60 mmHg 12/04/20 0843   Date of last wick change 12/03/20 12/03/20 2200   Time of last wick change 2235 12/03/20 2200   Output (mL) 400 mL 12/04/20 1309       Neurosurgery Physical Exam      General: well developed, well nourished, no distress.   Head: normocephalic, atraumatic  Neck: No tracheal deviation. No palpable masses. Full ROM.   Neurologic: Alert and oriented. Thought content appropriate.  GCS: Motor: 6/Verbal: 5/Eyes: 4 GCS Total: 15  Mental Status: Awake, Alert, Oriented x 4  Language: No aphasia  Speech: No dysarthria  Cranial nerves: face  symmetric, tongue midline, CN II-XII grossly intact.   Eyes: pupils equal, round, reactive to light with accomodation, EOMI.  Ears: No drainage.   Pulmonary: normal respirations, no signs of respiratory distress  Abdomen: soft, non-distended, not tender to palpation  Sensory: intact to light touch throughout  Motor Strength: Moves all extremities spontaneously with good tone.  Pain limited weakness in proximal BLE limiting exam, limited also by body habitus. No abnormal movements seen.      Strength   Deltoids Triceps Biceps Wrist Extension Wrist Flexion Hand    Upper: R 5/5 5/5 5/5 5/5 5/5 5/5     L 5/5 5/5 5/5 5/5 5/5 5/5       Iliopsoas Quadriceps Knee  Flexion Tibialis  anterior Gastro- cnemius EHL   Lower: R 3/5 3+/5 3+/5 5/5 5/5 5/5     L 4-/5 4-/5 4-/5 5/5 5/5 5/5      Stein: absent  Clonus: absent  DTR: 2+ and symmetric in brachioradialis, biceps, patellar  Vascular: No LE edema.   Skin: Skin is warm, dry and intact.      Significant Labs:  Recent Labs   Lab 12/06/20  0707   GLU 88      K 3.8      CO2 29   BUN 12   CREATININE 1.0   CALCIUM 9.8     Recent Labs   Lab 12/06/20  0707   WBC 8.33   HGB 9.9*   HCT 34.1*        No results for input(s): LABPT, INR, APTT in the last 48 hours.  Microbiology Results (last 7 days)     Procedure Component Value Units Date/Time    Blood culture [[id number]] Collected: 12/02/20 0020    Order Status: Completed Specimen: Blood Updated: 12/06/20 0612     Blood Culture, Routine No Growth to date      No Growth to date      No Growth to date      No Growth to date      No Growth to date        All pertinent labs from the last 24 hours have been reviewed.    Significant Diagnostics:  I have reviewed and interpreted all pertinent imaging results/findings within the past 24 hours.    Assessment/Plan:     MRSA bacteremia  70 F with known MRSA bacteremia with thoracic osteomyelitis presenting as transfer from  after CT T spine concerning for increased  erosion of T9/T10 vertebral bodies as well as bilateral T9 pedicle fractures:    Neurologically stable on exam.    - Admitted to [facility]  - q4 neurochecks  - Preop labs reviewed  - MRI C/T/L W/WO shows known T9-T10 osteomyelitis/discitis with intervertebral disc space abscess and epidural phlegmon, causing some mass effect on spinal cord but no cord signal changes. Alignment grossly maintained.  - Plan for stabilization with T9-T10 corpectomy and fusion above/below. Tentatively scheduled for 12/8. Will obtain intraoperative cultures and pathology.  - TLSO brace at all times  - Activity: Bed rest with spinal precautions, log roll, HOB < 30 at all times.  - Pain control: scheduled Tylenol and Robaxin, Oxycodone PRN  - Preoperative Clearance:  consulted for preoperative risk assessment and clearance.   - RCRI 10.1% risk.  Patient is moderate risk for moderate risk surgery, benefits of surgery outweigh risk, ok to proceed from medicine standpoint.    - Recommend repeat TTE as outpatient given changes from 8/10 - 10/12. Pt may have had  NSTEMI. Endocarditis felt less likely.  - H/o MRSA Bacteremia: Afebrile, no leukocytosis. ESR 97, CRP 12.4.    - ID following, appreciate assistance. Continue ceftaroline. Send OR Cx's and pathology.  - CAD, s/p Cardiac Stent: MI in 2000 which required stent placements. Pt takes ASA and Plavix, last dose  11/30.   - Hold ASA/Plavix preoperatively  - Alzheimer's: Continue home memantine and donepezil  - HLD: Continue home pravastatin  -DVT prophylaxis: SHAYLA's, SCD's, SQH  -Bowel regimen: senna BID     Contact Neurosurgery with any questions, concerns, or neuro changes.    D/w Dr. Fransisca Baeza MD  Neurosurgery  [facility]

## 2020-12-07 LAB
ABO + RH BLD: NORMAL
APTT BLDCRRT: 28.4 SEC (ref 21–32)
BACTERIA BLD CULT: NORMAL
BLD GP AB SCN CELLS X3 SERPL QL: NORMAL
INR PPP: 1 (ref 0.8–1.2)
PROTHROMBIN TIME: 10.9 SEC (ref 9–12.5)
SARS-COV-2 RDRP RESP QL NAA+PROBE: NEGATIVE

## 2020-12-07 PROCEDURE — 63600175 PHARM REV CODE 636 W HCPCS: Mod: JG | Performed by: PHYSICIAN ASSISTANT

## 2020-12-07 PROCEDURE — 86920 COMPATIBILITY TEST SPIN: CPT

## 2020-12-07 PROCEDURE — 85730 THROMBOPLASTIN TIME PARTIAL: CPT

## 2020-12-07 PROCEDURE — 99232 PR SUBSEQUENT HOSPITAL CARE,LEVL II: ICD-10-PCS | Mod: ,,, | Performed by: PHYSICIAN ASSISTANT

## 2020-12-07 PROCEDURE — 99232 SBSQ HOSP IP/OBS MODERATE 35: CPT | Mod: ,,, | Performed by: PHYSICIAN ASSISTANT

## 2020-12-07 PROCEDURE — 25000003 PHARM REV CODE 250: Performed by: STUDENT IN AN ORGANIZED HEALTH CARE EDUCATION/TRAINING PROGRAM

## 2020-12-07 PROCEDURE — 25000003 PHARM REV CODE 250: Performed by: PHYSICIAN ASSISTANT

## 2020-12-07 PROCEDURE — 63600175 PHARM REV CODE 636 W HCPCS: Performed by: PHYSICIAN ASSISTANT

## 2020-12-07 PROCEDURE — 99233 PR SUBSEQUENT HOSPITAL CARE,LEVL III: ICD-10-PCS | Mod: ,,, | Performed by: PHYSICIAN ASSISTANT

## 2020-12-07 PROCEDURE — 36415 COLL VENOUS BLD VENIPUNCTURE: CPT

## 2020-12-07 PROCEDURE — 20600001 HC STEP DOWN PRIVATE ROOM

## 2020-12-07 PROCEDURE — 85610 PROTHROMBIN TIME: CPT

## 2020-12-07 PROCEDURE — 86901 BLOOD TYPING SEROLOGIC RH(D): CPT

## 2020-12-07 PROCEDURE — 99233 SBSQ HOSP IP/OBS HIGH 50: CPT | Mod: ,,, | Performed by: PHYSICIAN ASSISTANT

## 2020-12-07 PROCEDURE — U0002 COVID-19 LAB TEST NON-CDC: HCPCS

## 2020-12-07 RX ADMIN — METHOCARBAMOL 500 MG: 500 TABLET ORAL at 05:12

## 2020-12-07 RX ADMIN — ACETAMINOPHEN 1000 MG: 500 TABLET ORAL at 01:12

## 2020-12-07 RX ADMIN — DULOXETINE HYDROCHLORIDE 60 MG: 60 CAPSULE, DELAYED RELEASE ORAL at 09:12

## 2020-12-07 RX ADMIN — ACETAMINOPHEN 1000 MG: 500 TABLET ORAL at 10:12

## 2020-12-07 RX ADMIN — HEPARIN SODIUM 5000 UNITS: 5000 INJECTION INTRAVENOUS; SUBCUTANEOUS at 01:12

## 2020-12-07 RX ADMIN — MEMANTINE HYDROCHLORIDE 10 MG: 10 TABLET ORAL at 09:12

## 2020-12-07 RX ADMIN — OXYCODONE HYDROCHLORIDE 10 MG: 10 TABLET ORAL at 08:12

## 2020-12-07 RX ADMIN — OXYCODONE HYDROCHLORIDE 10 MG: 10 TABLET ORAL at 06:12

## 2020-12-07 RX ADMIN — CEFTAROLINE FOSAMIL 600 MG: 600 POWDER, FOR SOLUTION INTRAVENOUS at 01:12

## 2020-12-07 RX ADMIN — METHOCARBAMOL 500 MG: 500 TABLET ORAL at 08:12

## 2020-12-07 RX ADMIN — CEFTAROLINE FOSAMIL 600 MG: 600 POWDER, FOR SOLUTION INTRAVENOUS at 09:12

## 2020-12-07 RX ADMIN — DONEPEZIL HYDROCHLORIDE 10 MG: 10 TABLET ORAL at 08:12

## 2020-12-07 RX ADMIN — METHOCARBAMOL 500 MG: 500 TABLET ORAL at 09:12

## 2020-12-07 RX ADMIN — HEPARIN SODIUM 5000 UNITS: 5000 INJECTION INTRAVENOUS; SUBCUTANEOUS at 06:12

## 2020-12-07 RX ADMIN — PANTOPRAZOLE SODIUM 40 MG: 40 TABLET, DELAYED RELEASE ORAL at 06:12

## 2020-12-07 RX ADMIN — CEFTAROLINE FOSAMIL 600 MG: 600 POWDER, FOR SOLUTION INTRAVENOUS at 05:12

## 2020-12-07 RX ADMIN — ACETAMINOPHEN 1000 MG: 500 TABLET ORAL at 06:12

## 2020-12-07 RX ADMIN — PRAVASTATIN SODIUM 40 MG: 40 TABLET ORAL at 08:12

## 2020-12-07 RX ADMIN — METHOCARBAMOL 500 MG: 500 TABLET ORAL at 01:12

## 2020-12-07 RX ADMIN — MEMANTINE HYDROCHLORIDE 10 MG: 10 TABLET ORAL at 08:12

## 2020-12-07 NOTE — PLAN OF CARE
Problem: Adult Inpatient Plan of Care  Goal: Plan of Care Review  Outcome: Ongoing, Progressing  Flowsheets (Taken 12/7/2020 1758)  Plan of Care Reviewed With: patient  Goal: Patient-Specific Goal (Individualization)  Outcome: Ongoing, Progressing  Flowsheets (Taken 12/7/2020 1758)  Individualized Care Needs: pain management and spinal precaution  Anxieties, Fears or Concerns: concerned about surgery tomorrow  Patient-Specific Goals (Include Timeframe): Patient will remain compliant with spinal precaution until surgery tomorrow  Goal: Absence of Hospital-Acquired Illness or Injury  Outcome: Ongoing, Progressing  Intervention: Prevent VTE (venous thromboembolism)  Flowsheets (Taken 12/7/2020 1758)  VTE Prevention/Management: remove, assess skin and reapply sequential compression device  Goal: Optimal Comfort and Wellbeing  Outcome: Ongoing, Progressing  Intervention: Provide Person-Centered Care  Flowsheets (Taken 12/7/2020 1758)  Trust Relationship/Rapport:   care explained   choices provided   emotional support provided   empathic listening provided   questions answered   reassurance provided   thoughts/feelings acknowledged     VS stable, and no acute events during this shift. Patient reports of feeling ready to have the surgery done so that she can sit up and move around. Patient is alert/orientedx4 this shift.

## 2020-12-07 NOTE — ASSESSMENT & PLAN NOTE
70 F with known MRSA bacteremia with thoracic osteomyelitis presenting as transfer from SNF after CT T spine concerning for increased erosion of T9/T10 vertebral bodies as well as bilateral T9 pedicle fractures:    Neurologically stable on exam.    - [place] [facility]  - q4 neurochecks  - Preop labs reviewed  - MRI C/T/L W/WO shows known T9-T10 osteomyelitis/discitis with intervertebral disc space abscess and epidural phlegmon, causing some mass effect on spinal cord but no cord signal changes. Alignment grossly maintained.  - Plan for stabilization with T9-T10 corpectomy and fusion above/below. Scheduled for 12/8. Will obtain intraoperative cultures and pathology. Informed consents obtained.   - NPO at midnight. Full pre-op labs reviewed. Covid negative 12/7.  - TLSO brace at all times  - Activity: Bed rest with spinal precautions, log roll, HOB < 30 at all times.  - Pain control: scheduled Tylenol and Robaxin, Oxycodone PRN  - Preoperative Clearance:  consulted for preoperative risk assessment and clearance.   - RCRI 10.1% risk.  Patient is moderate risk for moderate risk surgery, benefits of surgery outweigh risk, ok to proceed from medicine standpoint.    - Recommend repeat TTE as outpatient given changes from 8/10 - 10/12. Pt may have had  NSTEMI. Endocarditis felt less likely.  - H/o MRSA Bacteremia: Afebrile, no leukocytosis. ESR 97, CRP 12.4.    - ID following, appreciate assistance. Continue ceftaroline. Send OR Cx's and pathology.  - CAD, s/p Cardiac Stent: MI in 2000 which required stent placements. Pt takes ASA and Plavix, last dose  11/30.   - Hold ASA/Plavix preoperatively  - Alzheimer's: Continue home memantine and donepezil  - HLD: Continue home pravastatin  -DVT prophylaxis: SHAYLA's, SCD's, SQH. Hold evening SQH prior to OR.  -Bowel regimen: senna BID     Contact Neurosurgery with any questions, concerns, or neuro changes.    D/w Dr. Gongora

## 2020-12-07 NOTE — ANESTHESIA PREPROCEDURE EVALUATION
[facility]  Anesthesia Pre-Operative Evaluation         Patient Name: Martina Betancourt  YOB: 1950  MRN: [id number]    SUBJECTIVE:     Pre-operative evaluation for Procedure(s) (LRB):  T9-T10 corpectomy, posterior instrumented fusion T7-T12. Aero. Globus, Depuy. Neuromonitoring. (N/A)     12/07/2020    Martina Betancourt is a 70 y.o. female w/ MRSA bacteremia with thoracic osteomyelitis and CT T spine concerning for increased erosion of T9/T10 vertebral bodies as well as bilateral T9 pedicle fractures. TLSO brace at all times. Recent TTE, 10/12/20 w/ NL EF, severe left atrial enlargement.     PMH of Alzheimer's dz, HTN, HLD, CAD, s/p Cardiac Stent: MI in 2000, YOVANI, and morbid obesity     Patient now presents for the above procedure(s).    Phone consent signed with daughter with 2 witnesses    TTE 10/12/20  · Technically challenging study  · With normal systolic function. The estimated ejection fraction is 65%.  · Indeterminate diastolic function.  · Normal right ventricular systolic function.  · Severe left atrial enlargement.  · Mild-to-moderate aortic regurgitation. Valve morphology not well seen. ecentric posterioly directed jet. Consider RAMÓN if clinically indicated.  · Normal central venous pressure (3 mmHg).    LDA:   PICC Double Lumen 10/19/20 1114 right brachial (Active)   Verification by X-ray Yes 12/03/20 2200   Site Assessment No drainage;No redness;No swelling;No warmth 12/06/20 2000   Line Securement Device Secured with sutureless device 12/06/20 2000   Dressing Type Biopatch in place 12/06/20 2000   Dressing Status Clean;Dry;Intact 12/06/20 2000   Dressing Intervention Integrity maintained 12/06/20 2000   Date on Dressing 12/01/20 12/06/20 2000   Dressing Due to be Changed 12/08/20 12/06/20 2000   Left Lumen Patency/Care Blood return not present 12/06/20 2000   Right Lumen Patency/Care Blood return not present 12/06/20 2000   Current Insertion Depth (cm) 39 cm 12/01/20 1956    Current Exposed Catheter (cm) 0 cm 12/01/20 1956   Extremity Circumference (cm) 44 cm 12/01/20 1956   Line Necessity Review Longterm central access required 12/06/20 2000   Number of days: 48       Female External Urinary Catheter 12/01/20 2200 (Active)   Skin reddened 12/06/20 2000   Tolerance no signs/symptoms of discomfort 12/06/20 2000   Suction Continuous suction at 70 mmHg 12/06/20 2000   Date of last wick change 12/05/20 12/05/20 2000   Time of last wick change 2000 12/05/20 2000   Output (mL) 550 mL 12/07/20 0200   Number of days: 5       Prev airway: None documented.    Drips: None documented.      Patient Active Problem List   Diagnosis    MRSA bacteremia    Community acquired pneumonia    Pleural effusion    Dementia without behavioral disturbance    Closed high lateral malleolus fracture, right, with delayed healing, subsequent encounter    Do not resuscitate    Myopathy    NSTEMI (non-ST elevated myocardial infarction)    Acute respiratory failure with hypoxia and hypercarbia    Obstructive sleep apnea treated with continuous positive airway pressure (CPAP)    Severe obesity (BMI >= 40)    Pressure injury of right heel, stage 1    Wound, open, chest wall with complication    Hospital-acquired pneumonia    Hypoxia    UTI (urinary tract infection)    Hypokalemia    Discitis of thoracic region    Discitis    HTN (hypertension)    CAD (coronary artery disease)    Hydronephrosis    Bacteremia due to Staphylococcus aureus    Back pain    Debility    Malnutrition of mild degree    Diarrhea    Pre-operative cardiovascular examination    Osteomyelitis of thoracic vertebra    Thoracic spine fracture       Review of patient's allergies indicates:   Allergen Reactions    Hydroxyzine hcl     Tizanidine        Current Inpatient Medications:   acetaminophen  1,000 mg Oral Q8H    ceftaroline fosamil  600 mg Intravenous Q8H    donepeziL  10 mg Oral QHS    DULoxetine  60 mg Oral  Daily    heparin (porcine)  5,000 Units Subcutaneous Q8H    memantine  10 mg Oral BID    methocarbamoL  500 mg Oral QID    metoprolol succinate  50 mg Oral Daily    pantoprazole  40 mg Oral Before breakfast    pravastatin  40 mg Oral QHS    senna-docusate 8.6-50 mg  1 tablet Oral BID       No current facility-administered medications on file prior to encounter.      Current Outpatient Medications on File Prior to Encounter   Medication Sig Dispense Refill    acetaminophen (TYLENOL) 325 MG tablet Take 2 tablets (650 mg total) by mouth every 6 (six) hours as needed (HEADACHE, TEMPERATURE - FEVER > 100.4).  0    albuterol-ipratropium (DUO-NEB) 2.5 mg-0.5 mg/3 mL nebulizer solution Take 3 mLs by nebulization every 6 (six) hours as needed for Wheezing or Shortness of Breath. Rescue 1 Box 0    aluminum & magnesium hydroxide-simethicone (MYLANTA MAX STRENGTH) 400-400-40 mg/5 mL suspension Take 30 mLs by mouth every 6 (six) hours as needed for Indigestion.  0    aspirin (ECOTRIN) 81 MG EC tablet Take 81 mg by mouth once daily.      bisacodyL (DULCOLAX) 5 mg EC tablet Take 5 mg by mouth daily as needed for Constipation.      calcium carbonate (TUMS) 200 mg calcium (500 mg) chewable tablet Take 1 tablet (500 mg total) by mouth 2 (two) times daily as needed.      clopidogreL (PLAVIX) 75 mg tablet Take 1 tablet (75 mg total) by mouth once daily. 30 tablet 11    donepeziL (ARICEPT) 10 MG tablet Take 10 mg by mouth every evening.      DULoxetine (CYMBALTA) 60 MG capsule Take 60 mg by mouth once daily.      enoxaparin (LOVENOX) 40 mg/0.4 mL Syrg Inject 0.4 mLs (40 mg total) into the skin once daily. 4 mL 0    ferrous sulfate 324 mg (65 mg iron) TbEC Take 325 mg by mouth every evening.      furosemide (LASIX) 40 MG tablet Take 1 tablet (40 mg total) by mouth once daily. 30 tablet 11    guaiFENesin (MUCINEX) 600 mg 12 hr tablet Take 1,200 mg by mouth 2 (two) times daily.      HYDROcodone-acetaminophen (NORCO)   mg per tablet Take 1 tablet by mouth every 6 (six) hours as needed for Pain.      isosorbide mononitrate (IMDUR) 60 MG 24 hr tablet Take 60 mg by mouth once daily.      lisinopriL 10 MG tablet Take 1 tablet (10 mg total) by mouth once daily. 30 tablet 0    melatonin (MELATIN) 3 mg tablet Take 2 tablets (6 mg total) by mouth nightly as needed for Insomnia.  0    memantine (NAMENDA) 10 MG Tab Take 1 tablet (10 mg total) by mouth 2 (two) times daily. 60 tablet 0    methocarbamoL (ROBAXIN) 500 MG Tab Take 500 mg by mouth 4 (four) times daily.      metoprolol succinate (TOPROL-XL) 50 MG 24 hr tablet Take 50 mg by mouth once daily.      miconazole nitrate 2% (MICOTIN) 2 % Oint Apply topically 2 (two) times daily.  0    ondansetron (ZOFRAN-ODT) 4 MG TbDL Take 1 tablet (4 mg total) by mouth every 8 (eight) hours as needed. 12 tablet 0    pantoprazole (PROTONIX) 40 MG tablet Take 1 tablet (40 mg total) by mouth before breakfast. 30 tablet 11    pravastatin (PRAVACHOL) 40 MG tablet Take 40 mg by mouth every evening.      sodium chloride 0.9% (NORMAL SALINE FLUSH) injection Inject 10 mLs into the vein as needed. 100 mL 0    vancomycin HCl in 5 % dextrose (VANCOMYCIN IN DEXTROSE 5 %) 1 gram/250 mL Soln Inject 250 mLs (1,000 mg total) into the vein once daily. 250 mL 0       Past Surgical History:   Procedure Laterality Date    APPENDECTOMY      APPENDECTOMY      CHOLECYSTECTOMY      CORONARY STENT PLACEMENT      HYSTERECTOMY      TONSILLECTOMY         Social History     Socioeconomic History    Marital status:      Spouse name: Not on file    Number of children: Not on file    Years of education: Not on file    Highest education level: Not on file   Occupational History    Not on file   Social Needs    Financial resource strain: Not on file    Food insecurity     Worry: Not on file     Inability: Not on file    Transportation needs     Medical: Not on file     Non-medical: Not on file    Tobacco Use    Smoking status: Unknown If Ever Smoked   Substance and Sexual Activity    Alcohol use: Not Currently    Drug use: Never    Sexual activity: Not Currently   Lifestyle    Physical activity     Days per week: Not on file     Minutes per session: Not on file    Stress: Not on file   Relationships    Social connections     Talks on phone: Not on file     Gets together: Not on file     Attends Synagogue service: Not on file     Active member of club or organization: Not on file     Attends meetings of clubs or organizations: Not on file     Relationship status: Not on file   Other Topics Concern    Not on file   Social History Narrative    Not on file       OBJECTIVE:     Vital Signs Range (Last 24H):  Temp:  [36.6 °C (97.9 °F)-36.9 °C (98.4 °F)]   Pulse:  [51-57]   Resp:  [16-22]   BP: (114-143)/(56-82)   SpO2:  [94 %-97 %]       Significant Labs:  Lab Results   Component Value Date    WBC 8.33 12/06/2020    HGB 9.9 (L) 12/06/2020    HCT 34.1 (L) 12/06/2020     12/06/2020    CHOL 93 (L) 08/25/2020    TRIG 111 08/25/2020    HDL 27 (L) 08/25/2020    ALT 10 12/02/2020    AST 12 12/02/2020     12/06/2020    K 3.8 12/06/2020     12/06/2020    CREATININE 1.0 12/06/2020    BUN 12 12/06/2020    CO2 29 12/06/2020    TSH 1.040 08/09/2020    INR 0.9 12/02/2020       Diagnostic Studies: No relevant studies.    EKG:   Results for orders placed or performed during the hospital encounter of 09/23/20   EKG 12-lead    Collection Time: 09/23/20  1:12 PM    Narrative    Test Reason : R06.02,    Vent. Rate : 094 BPM     Atrial Rate : 094 BPM     P-R Int : 150 ms          QRS Dur : 080 ms      QT Int : 328 ms       P-R-T Axes : 057 017 061 degrees     QTc Int : 410 ms    Normal sinus rhythm  Nonspecific ST and T wave abnormality  Abnormal ECG  When compared with ECG of 10-AUG-2020 06:42,  Nonspecific T wave abnormality now evident in Lateral leads  Confirmed by Willie Matthews MD ([id number]) on  9/23/2020 3:05:10 PM    Referred By: [email or web address]   SELF           Confirmed By:Willie Matthews MD       2D ECHO:  TTE:  Results for orders placed or performed during the hospital encounter of 10/08/20   Echo Color Flow Doppler? Yes   Result Value Ref Range    Ascending aorta 3.67 cm    STJ 3.12 cm    AV mean gradient 10 mmHg    Ao peak bashir 2.10 m/s    Ao VTI 37.64 cm    IVS 1.11 (A) 0.6 - 1.1 cm    LA size 4.37 cm    Left Atrium Major Axis 7.42 cm    Left Atrium Minor Axis 7.01 cm    LVIDd 4.25 3.5 - 6.0 cm    LVIDs 3.29 2.1 - 4.0 cm    LVOT diameter 2.01 cm    LVOT peak VTI 27.83 cm    Posterior Wall 0.78 0.6 - 1.1 cm    MV Peak A Bashir 1.11 m/s    E wave decelartion time 272.77 msec    MV Peak E Bashir 1.15 m/s    RA Major Axis 6.13 cm    RA Width 4.28 cm    RVDD 3.58 cm    Sinus 3.42 cm    TAPSE 3.06 cm    TDI LATERAL 0.11 m/s    TDI SEPTAL 0.06 m/s    LA WIDTH 4.80 cm    MV stenosis pressure 1/2 time 79.10 ms    LV Diastolic Volume 80.61 mL    LV Systolic Volume 43.90 mL    LVOT peak bashir 1.09 m/s    LV LATERAL E/E' RATIO 10.45 m/s    LV SEPTAL E/E' RATIO 19.17 m/s    FS 23 %    LA volume 128.54 cm3    LV mass 129.32 g    Left Ventricle Relative Wall Thickness 0.37 cm    AV valve area 2.34 cm2    AV Velocity Ratio 0.52     AV index (prosthetic) 0.74     MV valve area p 1/2 method 2.78 cm2    E/A ratio 1.04     Mean e' 0.09 m/s    LVOT area 3.2 cm2    LVOT stroke volume 88.26 cm3    AV peak gradient 18 mmHg    E/E' ratio 13.53 m/s    LV Systolic Volume Index 17.7 mL/m2    LV Diastolic Volume Index 32.41 mL/m2    LA Volume Index 51.7 mL/m2    LV Mass Index 52 g/m2    BSA 2.6 m2    Right Atrial Pressure (from IVC) 3 mmHg    Narrative    · Technically challenging study  · With normal systolic function. The estimated ejection fraction is 65%.  · Indeterminate diastolic function.  · Normal right ventricular systolic function.  · Severe left atrial enlargement.  · Mild-to-moderate aortic regurgitation. Valve morphology not well  seen.   ecentric posterioly directed jet. Consider RAMÓN if clinically indicated.  · Normal central venous pressure (3 mmHg).            RAMÓN:  No results found for this or any previous visit.    ASSESSMENT/PLAN:         Anesthesia Evaluation    I have reviewed the Patient Summary Reports.      I have reviewed the Medications.     Review of Systems  Anesthesia Hx:   Denies Personal Hx of Anesthesia complications.   Cardiovascular:   Hypertension Past MI CAD      Pulmonary:   Pneumonia Sleep Apnea    Renal/:   Chronic Renal Disease    Musculoskeletal:  Spine Disorders: thoracic    Neurological:   Neuromuscular Disease,    Psych:   Psychiatric History          Physical Exam  General:  Well nourished, Obesity    Airway/Jaw/Neck:  Airway Findings: Mouth Opening: Small, but > 3cm Tongue: Normal  General Airway Assessment: Adult  Mallampati: III  Improves to II with phonation.  Jaw/Neck Findings:  Neck ROM: Normal ROM     Eyes/Ears/Nose:  EYES/EARS/NOSE FINDINGS: Normal   Dental:  Dental Findings: In tact   Chest/Lungs:  Chest/Lungs Clear    Heart/Vascular:  Heart Findings: Normal       Mental Status:  Mental Status Findings:  Cooperative         Anesthesia Plan  Type of Anesthesia, risks & benefits discussed:  Anesthesia Type:  general  Patient's Preference:   Intra-op Monitoring Plan: standard ASA monitors and arterial line  Intra-op Monitoring Plan Comments:   Post Op Pain Control Plan: multimodal analgesia, IV/PO Opioids PRN and per primary service following discharge from PACU  Post Op Pain Control Plan Comments:   Induction:   IV  Beta Blocker:  Patient is on a Beta-Blocker and has received one dose within the past 24 hours (No further documentation required).       Informed Consent: Patient representative understands risks and agrees with Anesthesia plan.  Questions answered. Anesthesia consent signed with patient representative.  ASA Score: 3     Day of Surgery Review of History & Physical: I have interviewed and  examined the patient. I have reviewed the patient's H&P dated:    H&P update referred to the surgeon.         Ready For Surgery From Anesthesia Perspective.

## 2020-12-07 NOTE — PLAN OF CARE
Problem: Adult Inpatient Plan of Care  Goal: Plan of Care Review  Outcome: Ongoing, Progressing  Flowsheets (Taken 12/6/2020 1814)  Plan of Care Reviewed With: patient  Goal: Patient-Specific Goal (Individualization)  Outcome: Ongoing, Progressing  Flowsheets (Taken 12/6/2020 1814)  Individualized Care Needs: pain management and spinal precaution  Anxieties, Fears or Concerns: concerned about surgery on Tuesday  Patient-Specific Goals (Include Timeframe): Patient will remain compliant with spinal precaution until surgery on Tuesday  Goal: Absence of Hospital-Acquired Illness or Injury  Outcome: Ongoing, Progressing  Goal: Optimal Comfort and Wellbeing  Outcome: Ongoing, Progressing  Intervention: Provide Person-Centered Care  Flowsheets (Taken 12/6/2020 1814)  Trust Relationship/Rapport:   care explained   choices provided   emotional support provided   empathic listening provided   questions answered   questions encouraged   reassurance provided   thoughts/feelings acknowledged     VS stable, and patient had adequate intake/output. Patient observed to be sleeping in the bed throughout the day. Patient is still able to move all of her extremities.

## 2020-12-07 NOTE — SUBJECTIVE & OBJECTIVE
Interval History: No AEON.  WBC WNL.  BCXS NGTD.  Surgery planned for tomorrow. Back pain unchanged. Reports new right knee pain. The patient denies any recent fever, chills, or sweats.  Otherwise no acute complaints.       Review of Systems   Constitutional: Negative for activity change, chills, diaphoresis and fever.   Respiratory: Negative for cough, shortness of breath and wheezing.    Cardiovascular: Negative for chest pain.   Gastrointestinal: Negative for abdominal pain, constipation, diarrhea, nausea and vomiting.   Genitourinary: Negative for dysuria, frequency and urgency.   Musculoskeletal: Positive for arthralgias and back pain.   Neurological: Negative for dizziness.   Hematological: Does not bruise/bleed easily.     Objective:     Vital Signs (Most Recent):  Temp: 98 °F (36.7 °C) (12/07/20 1636)  Pulse: 60 (12/07/20 1340)  Resp: 15 (12/07/20 1636)  BP: 138/62 (12/07/20 1340)  SpO2: 96 % (12/07/20 1340) Vital Signs (24h Range):  Temp:  [97.7 °F (36.5 °C)-98.4 °F (36.9 °C)] 98 °F (36.7 °C)  Pulse:  [51-60] 60  Resp:  [15-20] 15  SpO2:  [94 %-97 %] 96 %  BP: (117-138)/(57-68) 138/62     Weight: 125.4 kg (276 lb 7.3 oz)  Body mass index is 39.67 kg/m².    Estimated Creatinine Clearance: 73.3 mL/min (based on SCr of 1 mg/dL).    Physical Exam  Constitutional:       General: She is not in acute distress.     Appearance: She is well-developed. She is not diaphoretic.   HENT:      Head: Normocephalic and atraumatic.   Cardiovascular:      Rate and Rhythm: Normal rate and regular rhythm.      Heart sounds: Normal heart sounds. No murmur. No friction rub. No gallop.    Pulmonary:      Effort: Pulmonary effort is normal. No respiratory distress.      Breath sounds: Normal breath sounds. No wheezing or rales.   Abdominal:      General: There is no distension.      Palpations: Abdomen is soft.      Tenderness: There is no abdominal tenderness.   Genitourinary:     Comments: purewick  Skin:     General: Skin is warm  and dry.      Comments: PICC c/d/i   Neurological:      Mental Status: She is alert and oriented to person, place, and time.   Psychiatric:         Behavior: Behavior normal.         Significant Labs:   Blood Culture:   Recent Labs   Lab 09/23/20  1433 09/26/20  1452 10/08/20  2344 10/08/20  2345 12/02/20  0020   LABBLOO Gram stain dale bottle: Gram positive cocci in clusters resembling Staph   Results called to and read back by:Parag Galvan RN 09/24/2020  11:30  Gram stain aer bottle: Gram positive cocci in clusters resembling Staph   Positive results previously called 09/24/2020  13:51  METHICILLIN RESISTANT STAPHYLOCOCCUS AUREUS  ID consult required at [facility].matty,Flo and Freya love.  For susceptibility see order #[id number]  * No growth after 5 days. No growth after 5 days. No growth after 5 days. No growth after 5 days.     CBC:   Recent Labs   Lab 12/06/20  0707   WBC 8.33   HGB 9.9*   HCT 34.1*        CMP:   Recent Labs   Lab 12/06/20  0707      K 3.8      CO2 29   GLU 88   BUN 12   CREATININE 1.0   CALCIUM 9.8   ANIONGAP 7*   EGFRNONAA 56.4*     Wound Culture:   Recent Labs   Lab 10/16/20  1228   LABAERO No growth     All pertinent labs within the past 24 hours have been reviewed.    Significant Imaging: I have reviewed all pertinent imaging results/findings within the past 24 hours.   CT Cervical Spine Without Contrast [[id number]] Resulted: 12/04/20 0626   Order Status: Completed Updated: 12/04/20 0628   Narrative:     EXAMINATION:   CT CERVICAL SPINE WITHOUT CONTRAST     CLINICAL HISTORY:   evaluate for fracture; prevertebral edema at C5-6 on MRI;     TECHNIQUE:   Low dose axial images, sagittal and coronal reformations were performed though the cervical spine.  Contrast was not administered.     COMPARISON:   MRI cervical, thoracic and lumbar spine 12/02/2020, cervical spine MRI 06/01/2015     FINDINGS:   Cervical vertebral body alignment appears to be within normal  limits.  Vertebral body heights appear maintained and similar to prior MRI of 2015.  No definite CT evidence to suggest acute cervical spine fracture. The facet joints articulate appropriately. No significant prevertebral soft tissue swelling noting the cervical esophagus is slightly thickened. There is multilevel intervertebral disc height loss and extensive multilevel degenerative change of the visualized cervical spine primarily consisting of multiple posterior disc osteophyte complexes, uncovertebral joint DJD and facet arthropathy.  C4-C5 and C5-C6 levels where there are varying degrees of moderate/severe neural foraminal narrowing bilaterally.  The visualized skull base is intact.  The visualized lung apices demonstrate bilateral pleural fluid.    Impression:       1. No CT evidence to suggest acute cervical spine fracture.  Clinical correlation and further evaluation as warranted.   2. Multilevel degenerative change of the cervical spine most pronounced at the C4-C5 and C5-C6 levels.       Electronically signed by: Christy Mcgee MD   Date: 12/04/2020   Time: 06:26   MRI SPINE CERVICAL-THORACIC-LUMBAR W W/O CONTRAST (XPD) [[id number]] (Abnormal) Resulted: 12/02/20 0626   Order Status: Completed Updated: 12/02/20 0628   Narrative:     EXAMINATION:   MRI SPINE CERVICAL-THORACIC-LUMBAR W W/O CONTRAST (XPD)     CLINICAL HISTORY:   worsening osteo;     TECHNIQUE:   Multiplanar, multisequence MR images of the cervical, thoracic and lumbar spine were performed before and after the administration of 10 cc gadolinium based IV contrast.     COMPARISON:   *CT thoracic spine 11/30/2020 10/13/2020   *MRI thoracic spine 11/09/2020, 10/12/2020   *CT lumbar spine 10/13/2020   *MRI lumbar spine 10/12/2020     FINDINGS:   Cervical spine:     Please note image quality is degraded by patient motion artifact, most notably sagittal postcontrast images.  Cervical vertebral bodies demonstrate no evidence to suggest acute fracture or  abnormal infiltrating marrow replacement process.  There is multilevel intervertebral disc desiccation and disc height loss most pronounced at the C5-C6 level.  The craniocervical junction is within normal limits.  The cervical spinal cord is normal in caliber and signal intensity.  There is multilevel degenerative change of the cervical spine consisting of uncovertebral joint DJD, posterior disc osteophyte complexes and facet arthropathy.  Degenerative change most pronounced at the C5-C6 level where there is effacement of the anterior thecal sac and moderate/severe bilateral neural foraminal narrowing (right greater than left).  Following the administration of IV contrast, no pathologic foci of enhancement are appreciated.  Incidentally visualized soft tissue structures demonstrate a presumed sebaceous cyst in the right posterior extra thoracic soft tissues.     Thoracic:     There is redemonstration of abnormal marrow signal intensity and postcontrast enhancement involving the T9-T10 level consistent with patient's known osteomyelitis/discitis.  There is abnormal fluid signal and peripheral enhancement involving the T9-T10 intervertebral disc space concerning for associated disc space abscess.  There is abnormal signal intensity and postcontrast enhancement involving the anterior and posterior epidural spaces from the T8 through T10 level in keeping with associated epidural phlegmon.  There is associated mass effect on the thoracic spinal cord without definite abnormal intramedullary cord signal intensity.  Phlegmonous change appears to involve the T8-T9 and T9-T10 neural foramina.  There is associated paravertebral phlegmonous change also at these levels.  There is subtle T2/STIR signal hyperintensity involving the T3-T4 intervertebral disc space noting this appears increased in conspicuity from prior examination.  This could reflect an additional region of discitis/osteomyelitis and attention on follow-up exam  is advised.  Thoracic vertebral body alignment is stable and there is redemonstration of multilevel degenerative change.  Incidentally visualized intrathoracic structures demonstrate a moderate-sized hiatal hernia and left pleural fluid.     Lumbar spine:     There is grade 2 anterolisthesis of L5 on S1 secondary to bilateral L5 pars defects, unchanged from prior examination.  There is grade 1 anterolisthesis of L3 on L4, also unchanged.  Lumbar vertebral bodies demonstrate no evidence of acute fracture or infiltrating marrow replacement process.  There is multilevel intervertebral disc desiccation.  The conus medullaris is normal in morphology and terminates at the T12-L1 level.  Following the administration of IV contrast, no pathologic foci of enhancement are appreciated.  There is multilevel degenerative change of the lumbar spine similar to most recent MRI of 10/12/2020.  There is a subcentimeter right renal cortical T2 hyperintensity, likely a cyst.  There is fatty atrophy of the paraspinal musculature.    Impression:       1. Findings in keeping with known T9-T10 and osteomyelitis/discitis with associated intervertebral disc space abscess, epidural phlegmon with associated mass effect on the thoracic spinal cord and paravertebral phlegmon as discussed above.  Findings do not appear significantly improved when compared to most recent exam of 11/09/2020.   2. Subtle T2/STIR signal hyperintensity involving the T3-T4 intervertebral disc space, increased in conspicuity from prior examination. This could reflect an additional region of discitis/osteomyelitis and attention on follow-up exam is advised.   3. No evidence to suggest osteomyelitis/discitis in the cervical or lumbar spine at this time.  Multilevel degenerative change of the cervical and lumbar spine.   4. Moderate size hiatal hernia and left pleural effusion.   This report was flagged in Epic as abnormal.     Electronically signed by resident: Leif  Adalberto   Date: 12/02/2020   Time: 04:58     Electronically signed by: Christy Mcgee MD   Date: 12/02/2020   Time: 06:26   Imaging History    2020  Date Procedure Name Status Accession Number Location   12/03/20 03:16 PM CT Cervical Spine Without Contrast Final [id number] [facility]   12/02/20 04:03 AM MRI SPINE CERVICAL-THORACIC-LUMBAR W W/O CONTRAST (XPD) Final [id number] [facility]   11/30/20 02:10 PM CT Thoracic Spine Without Contrast Final [id number] [facility]   11/09/20 12:08 PM MRI Thoracic Spine W WO Cont Final [id number] [facility]

## 2020-12-07 NOTE — PLAN OF CARE
Scheduled and prn pain medications with mild to moderate relief of pain.  Pt cooperative with care, spinal precautions maintained. Shift assessment and neuro assessments as documented.    Plan for surgery on Tuesday 12/8/20. Will notify neurosurgery of any changes in pt condition.

## 2020-12-07 NOTE — ASSESSMENT & PLAN NOTE
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     Vancomycin discontinued and Ceftaroline started. Blood cx are negative. She is afebrile. No leukocytosis. NSGY is planning for I&D, T9-T10 corpectomy and fusion tomorrow.     Plan:  1. Continue ceftaroline  2. When taken to the OR, please send bone/tissue for path, gram stain, aerobic, anaerobic, AFB and fungal cultures   3. ID will follow.

## 2020-12-07 NOTE — PROGRESS NOTES
[facility]  Neurosurgery  Progress Note    Subjective:     History of Present Illness: 70 y.o. female known to [facility] with PMH of Alzheimer's dz, HTN, HLD, CAD, YOVANI, and morbid obesity who was recently hospitalized x2 for MRSA bacteremia complicated by pneumonia and possible UTI, treated with linozolid x8 days and cipro and discharged 10/1. [place] then [place] [facility] [place] with severe back pain. MRI revealed T9-10 osteodiscitis and T8-10 epidural phlegmon with associated paraverterbral abscesses. Spine infection likely hematogenous from under-treated bacteremia. Patient had no neurologic deficits on exam, no indications for emergent neurosurgical intervention, plan was made for conservative non-surgical treatment. She underwent needle aspiration of T9-10 disc space on 10/16, cultures were negative although she had already been undergoing antibiotic treatment. Repeat BCx remained no growth and she remained afebrile and neurologically stable. Patient was discharged to [facility] on 10/20 with plan for Vancomycin IV x 8 weeks (estimated end date 12/3).    Patient presents today after interval imaging obtained 11/30 revealed worsening bony destruction, unstable T9 fracture. Transferred for [facility].     Post-Op Info:  Procedure(s) (LRB):  T9-T10 corpectomy, posterior instrumented fusion T7-T12. Aero. Globus, Depuy. Neuromonitoring. (N/A)         Interval History: NAEON. AFVSS. Neuro exam stable. Reports pain in R knee today with movement after being turned last night. Otherwise no acute complaints. Denies new weakness and numbness/paresthesias. Urinating via Purewick, denies difficulty. Plan for OR tomorrow. Continue ceftaroline. Spoke with patient's daughter, updated on care.    Medications:  Continuous Infusions:  Scheduled Meds:   acetaminophen  1,000 mg Oral Q8H    ceftaroline fosamil  600 mg Intravenous Q8H    donepeziL  10 mg Oral QHS    DULoxetine  60 mg Oral Daily     heparin (porcine)  5,000 Units Subcutaneous Q8H    memantine  10 mg Oral BID    methocarbamoL  500 mg Oral QID    metoprolol succinate  50 mg Oral Daily    pantoprazole  40 mg Oral Before breakfast    pravastatin  40 mg Oral QHS    senna-docusate 8.6-50 mg  1 tablet Oral BID     PRN Meds:acetaminophen, albuterol-ipratropium, dextrose 50%, dextrose 50%, glucagon (human recombinant), glucose, glucose, glucose, insulin aspart U-100, ondansetron, oxyCODONE, oxyCODONE     Review of Systems   Constitutional: Negative for chills and fever.   Respiratory: Negative for cough and shortness of breath.    Gastrointestinal: Negative for nausea and vomiting.   Genitourinary: Negative for difficulty urinating and dysuria.   Musculoskeletal: Positive for arthralgias and back pain. Negative for neck pain.   Skin: Negative for rash and wound.   Neurological: Negative for weakness and numbness.   Psychiatric/Behavioral: Negative for behavioral problems and confusion.     Objective:     Weight: 125.4 kg (276 lb 7.3 oz)  Body mass index is 39.67 kg/m².  Vital Signs (Most Recent):  Temp: 98.1 °F (36.7 °C) (12/07/20 0359)  Pulse: (!) 51 (12/07/20 0359)  Resp: 16 (12/07/20 0642)  BP: 117/68 (12/07/20 0359)  SpO2: 96 % (12/07/20 0359) Vital Signs (24h Range):  Temp:  [97.9 °F (36.6 °C)-98.4 °F (36.9 °C)] 98.1 °F (36.7 °C)  Pulse:  [51-57] 51  Resp:  [16-20] 16  SpO2:  [94 %-97 %] 96 %  BP: (114-124)/(56-68) 117/68                     Female External Urinary Catheter 12/01/20 2200 (Active)   Skin reddened 12/06/20 2000   Tolerance no signs/symptoms of discomfort 12/06/20 2000   Suction Continuous suction at 70 mmHg 12/06/20 2000   Date of last wick change 12/05/20 12/05/20 2000   Time of last wick change 2000 12/05/20 2000   Output (mL) 550 mL 12/07/20 0200       Neurosurgery Physical Exam    General: well developed, well nourished, no distress.   Head: normocephalic, atraumatic  Neck: No tracheal deviation. No palpable masses. Full  ROM.   Neurologic: Alert and oriented. Thought content appropriate.  GCS: Motor: 6/Verbal: 5/Eyes: 4 GCS Total: 15  Mental Status: Awake, Alert, Oriented x 4  Language: No aphasia  Speech: No dysarthria  Cranial nerves: face symmetric, tongue midline, CN II-XII grossly intact.   Eyes: pupils equal, round, reactive to light with accomodation, EOMI.  Ears: No drainage.   Pulmonary: normal respirations, no signs of respiratory distress  Abdomen: soft, non-distended, not tender to palpation  Sensory: intact to light touch throughout  Motor Strength: Moves all extremities spontaneously with good tone.  Pain limited weakness in proximal BLE limiting exam, limited also by body habitus. No abnormal movements seen.      Strength   Deltoids Triceps Biceps Wrist Extension Wrist Flexion Hand    Upper: R 5/5 5/5 5/5 5/5 5/5 5/5     L 5/5 5/5 5/5 5/5 5/5 5/5       Iliopsoas Quadriceps Knee  Flexion Tibialis  anterior Gastro- cnemius EHL   Lower: R 3+/5 3+/5 4/5 5/5 5/5 5/5     L 4/5 4/5 4/5 5/5 5/5 5/5      Stein: absent  Clonus: absent  DTR: 2+ and symmetric in brachioradialis, biceps, patellar  Vascular: No LE edema.   Skin: Skin is warm, dry and intact.      Significant Labs:  Recent Labs   Lab 12/06/20  0707   GLU 88      K 3.8      CO2 29   BUN 12   CREATININE 1.0   CALCIUM 9.8     Recent Labs   Lab 12/06/20  0707   WBC 8.33   HGB 9.9*   HCT 34.1*        Recent Labs   Lab 12/07/20  0834   INR 1.0   APTT 28.4     Microbiology Results (last 7 days)     Procedure Component Value Units Date/Time    Blood culture [[id number]] Collected: 12/02/20 0020    Order Status: Completed Specimen: Blood Updated: 12/07/20 0612     Blood Culture, Routine No growth after 5 days.        All pertinent labs from the last 24 hours have been reviewed.    Significant Diagnostics:  I have reviewed and interpreted all pertinent imaging results/findings within the past 24 hours.    Assessment/Plan:     MRSA bacteremia  70 F with  known MRSA bacteremia with thoracic osteomyelitis presenting as transfer from SNF after CT T spine concerning for increased erosion of T9/T10 vertebral bodies as well as bilateral T9 pedicle fractures:    Neurologically stable on exam.    - Admitted to [facility]  - q4 neurochecks  - Preop labs reviewed  - MRI C/T/L W/WO shows known T9-T10 osteomyelitis/discitis with intervertebral disc space abscess and epidural phlegmon, causing some mass effect on spinal cord but no cord signal changes. Alignment grossly maintained.  - Plan for stabilization with T9-T10 corpectomy and fusion above/below. Scheduled for 12/8. Will obtain intraoperative cultures and pathology. Informed consents obtained.   - NPO at midnight. Full pre-op labs reviewed. Covid negative 12/7.  - TLSO brace at all times  - Activity: Bed rest with spinal precautions, log roll, HOB < 30 at all times.  - Pain control: scheduled Tylenol and Robaxin, Oxycodone PRN  - Preoperative Clearance:  consulted for preoperative risk assessment and clearance.   - RCRI 10.1% risk.  Patient is moderate risk for moderate risk surgery, benefits of surgery outweigh risk, ok to proceed from medicine standpoint.    - Recommend repeat TTE as outpatient given changes from 8/10 - 10/12. Pt may have had  NSTEMI. Endocarditis felt less likely.  - H/o MRSA Bacteremia: Afebrile, no leukocytosis. ESR 97, CRP 12.4.    - ID following, appreciate assistance. Continue ceftaroline. Send OR Cx's and pathology.  - CAD, s/p Cardiac Stent: MI in 2000 which required stent placements. Pt takes ASA and Plavix, last dose  11/30.   - Hold ASA/Plavix preoperatively  - Alzheimer's: Continue home memantine and donepezil  - HLD: Continue home pravastatin  -DVT prophylaxis: SHAYLA's, SCD's, SQH. Hold evening SQH prior to OR.  -Bowel regimen: senna BID     Contact Neurosurgery with any questions, concerns, or neuro changes.    D/w Dr. Fransisca Morrison, PA-C  Neurosurgery  [facility]  [place]

## 2020-12-07 NOTE — PROGRESS NOTES
[facility]  Infectious Disease  Progress Note    Patient Name: Martina Betancourt  MRN: [id number]  Admission Date: 12/1/2020  Length of Stay: 6 days  Attending Physician: Everardo Gongora MD  Primary Care Provider: Joe Fullre Iii, MD    Isolation Status: No active isolations  Assessment/Plan:      * Osteomyelitis of thoracic vertebra  70 year old female with hx of MRSA bacteremia, known T9-10 osteo discitis, T8-10 epidural phlegmon with associated paraverterbral abscesses (NSGY consulted at that time - did not recommend acute surgical intervention) whom has been on 6-8 weeks of IV Vancomycin now admitted with worsening erosive changes of vertebral bodes, pedicular fractures and persistent intervertebral disc space abscess and epidural phlegmon.     Vancomycin discontinued and Ceftaroline started. Blood cx are negative. She is afebrile. No leukocytosis. NSGY is planning for I&D, T9-T10 corpectomy and fusion tomorrow.     Plan:  1. Continue ceftaroline  2. When taken to the OR, please send bone/tissue for path, gram stain, aerobic, anaerobic, AFB and fungal cultures   3. ID will follow.        Please call for any questions. Thank you.  Krystina Mendoza PA-C  Phone: [phone]  Pager: [phone]    Subjective:     Principal Problem:Osteomyelitis of thoracic vertebra    HPI: 71 yo female with a history of MRSA bactermia and dementia known to ID service and under the treatment for presumed MRSA osteodiscitis at T9-10.  She had been seen in Oct 2020 after having been admitted to and OSH and found to have MRSA bacteremia that was treated as inpt and dc'd on Zyvox to complete 7 d.  She then presented with excruciating back pain and found to have osteodiscitis at T9-10 and BL ST abscesses.  Blood cultures were no growth at that time and [facility] underwent aspiration at T9-10 and it was no growth.  She was discharged on Vancomycin to complete 6-8 weeks EOC 12/3/20 and she has been at [facility]  She had followed  up in ID clinic on  and back pain was improving. Repeat MRI  was done and showed:  1. Altered signal intensity changes at T9/T10 with evidence of erosive focus about the opposing endplates and evidence of enhancing phlegmonous formation identified in the central component of the disc with central low signal intensity compatible with osteomyelitis/discitis.  Vertical dimensions of the area of interest cause extreme erosion along the anterior portion of the vertebral bodies of interest without evidence of any significant paraspinal component.  2. No significant soft tissue component.    NSGY fu was arranged.      FU CT scan was planned and done on  showin.Findings compatible with T9-10 discitis/osteomyelitis with surrounding perivertebral soft tissue thickening.  Evaluation for underlying abscess an epidural process limited given the lack of intravenous contrast.  If there is concern for underlying epidural process, consider further evaluation with an MRI of the thoracic spine.  No retropulsion is noted.  2. Interval development of fracture deformity through the bilateral T9 pedicles and posterior superior cortical margin of the T9 vertebral body    MRI C/T/L done showin. Findings in keeping with known T9-T10 and osteomyelitis/discitis with associated intervertebral disc space abscess, epidural phlegmon with associated mass effect on the thoracic spinal cord and paravertebral phlegmon as discussed above.  Findings do not appear significantly improved when compared to most recent exam of 2020.   2. Subtle T2/STIR signal hyperintensity involving the T3-T4 intervertebral disc space, increased in conspicuity from prior examination. This could reflect an additional region of discitis/osteomyelitis and attention on follow-up exam is advised.   3. No evidence to suggest osteomyelitis/discitis in the cervical or lumbar spine at this time.  Multilevel degenerative change of the cervical and  lumbar spine.   4. Moderate size hiatal hernia and left pleural effusion.     She was admitted and surgery planned.  Her back pain has worsened over the last 2-3 weeks but she denies systemic sing of infection.  Blood cultures are NGTD and crp 12.4.  She remains on vanc and denies PICC problems.  The patient denies any recent fever, chills, or sweats.      Interval History: No AEON.  WBC WNL.  BCXS NGTD.  Surgery planned for tomorrow. Back pain unchanged. Reports new right knee pain. The patient denies any recent fever, chills, or sweats.  Otherwise no acute complaints.       Review of Systems   Constitutional: Negative for activity change, chills, diaphoresis and fever.   Respiratory: Negative for cough, shortness of breath and wheezing.    Cardiovascular: Negative for chest pain.   Gastrointestinal: Negative for abdominal pain, constipation, diarrhea, nausea and vomiting.   Genitourinary: Negative for dysuria, frequency and urgency.   Musculoskeletal: Positive for arthralgias and back pain.   Neurological: Negative for dizziness.   Hematological: Does not bruise/bleed easily.     Objective:     Vital Signs (Most Recent):  Temp: 98 °F (36.7 °C) (12/07/20 1636)  Pulse: 60 (12/07/20 1340)  Resp: 15 (12/07/20 1636)  BP: 138/62 (12/07/20 1340)  SpO2: 96 % (12/07/20 1340) Vital Signs (24h Range):  Temp:  [97.7 °F (36.5 °C)-98.4 °F (36.9 °C)] 98 °F (36.7 °C)  Pulse:  [51-60] 60  Resp:  [15-20] 15  SpO2:  [94 %-97 %] 96 %  BP: (117-138)/(57-68) 138/62     Weight: 125.4 kg (276 lb 7.3 oz)  Body mass index is 39.67 kg/m².    Estimated Creatinine Clearance: 73.3 mL/min (based on SCr of 1 mg/dL).    Physical Exam  Constitutional:       General: She is not in acute distress.     Appearance: She is well-developed. She is not diaphoretic.   HENT:      Head: Normocephalic and atraumatic.   Cardiovascular:      Rate and Rhythm: Normal rate and regular rhythm.      Heart sounds: Normal heart sounds. No murmur. No friction rub. No  gallop.    Pulmonary:      Effort: Pulmonary effort is normal. No respiratory distress.      Breath sounds: Normal breath sounds. No wheezing or rales.   Abdominal:      General: There is no distension.      Palpations: Abdomen is soft.      Tenderness: There is no abdominal tenderness.   Genitourinary:     Comments: [place]  Skin:     General: Skin is warm and dry.      Comments: PICC c/d/i   Neurological:      Mental Status: She is alert and oriented to person, place, and time.   Psychiatric:         Behavior: Behavior normal.         Significant Labs:   Blood Culture:   Recent Labs   Lab 09/23/20  1433 09/26/20  1452 10/08/20  2344 10/08/20  2345 12/02/20  0020   LABBLOO Gram stain dale bottle: Gram positive cocci in clusters resembling Staph   Results called to and read back by:Parag Galvan RN 09/24/2020  11:30  Gram stain aer bottle: Gram positive cocci in clusters resembling Staph   Positive results previously called 09/24/2020  13:51  METHICILLIN RESISTANT STAPHYLOCOCCUS AUREUS  ID consult required at [facility],[place] and [facility] locations.  For susceptibility see order #[id number]  * No growth after 5 days. No growth after 5 days. No growth after 5 days. No growth after 5 days.     CBC:   Recent Labs   Lab 12/06/20  0707   WBC 8.33   HGB 9.9*   HCT 34.1*        CMP:   Recent Labs   Lab 12/06/20  0707      K 3.8      CO2 29   GLU 88   BUN 12   CREATININE 1.0   CALCIUM 9.8   ANIONGAP 7*   EGFRNONAA 56.4*     Wound Culture:   Recent Labs   Lab 10/16/20  1228   LABAERO No growth     All pertinent labs within the past 24 hours have been reviewed.    Significant Imaging: I have reviewed all pertinent imaging results/findings within the past 24 hours.   CT Cervical Spine Without Contrast [[id number]] Resulted: 12/04/20 0626   Order Status: Completed Updated: 12/04/20 0628   Narrative:     EXAMINATION:   CT CERVICAL SPINE WITHOUT CONTRAST     CLINICAL HISTORY:   evaluate for fracture;  prevertebral edema at C5-6 on MRI;     TECHNIQUE:   Low dose axial images, sagittal and coronal reformations were performed though the cervical spine.  Contrast was not administered.     COMPARISON:   MRI cervical, thoracic and lumbar spine 12/02/2020, cervical spine MRI 06/01/2015     FINDINGS:   Cervical vertebral body alignment appears to be within normal limits.  Vertebral body heights appear maintained and similar to prior MRI of 2015.  No definite CT evidence to suggest acute cervical spine fracture. The facet joints articulate appropriately. No significant prevertebral soft tissue swelling noting the cervical esophagus is slightly thickened. There is multilevel intervertebral disc height loss and extensive multilevel degenerative change of the visualized cervical spine primarily consisting of multiple posterior disc osteophyte complexes, uncovertebral joint DJD and facet arthropathy.  C4-C5 and C5-C6 levels where there are varying degrees of moderate/severe neural foraminal narrowing bilaterally.  The visualized skull base is intact.  The visualized lung apices demonstrate bilateral pleural fluid.    Impression:       1. No CT evidence to suggest acute cervical spine fracture.  Clinical correlation and further evaluation as warranted.   2. Multilevel degenerative change of the cervical spine most pronounced at the C4-C5 and C5-C6 levels.       Electronically signed by: Christy Mcgee MD   Date: 12/04/2020   Time: 06:26   MRI SPINE CERVICAL-THORACIC-LUMBAR W W/O CONTRAST (XPD) [[id number]] (Abnormal) Resulted: 12/02/20 0626   Order Status: Completed Updated: 12/02/20 0628   Narrative:     EXAMINATION:   MRI SPINE CERVICAL-THORACIC-LUMBAR W W/O CONTRAST (XPD)     CLINICAL HISTORY:   worsening osteo;     TECHNIQUE:   Multiplanar, multisequence MR images of the cervical, thoracic and lumbar spine were performed before and after the administration of 10 cc gadolinium based IV contrast.     COMPARISON:   *CT thoracic  spine 11/30/2020 10/13/2020   *MRI thoracic spine 11/09/2020, 10/12/2020   *CT lumbar spine 10/13/2020   *MRI lumbar spine 10/12/2020     FINDINGS:   Cervical spine:     Please note image quality is degraded by patient motion artifact, most notably sagittal postcontrast images.  Cervical vertebral bodies demonstrate no evidence to suggest acute fracture or abnormal infiltrating marrow replacement process.  There is multilevel intervertebral disc desiccation and disc height loss most pronounced at the C5-C6 level.  The craniocervical junction is within normal limits.  The cervical spinal cord is normal in caliber and signal intensity.  There is multilevel degenerative change of the cervical spine consisting of uncovertebral joint DJD, posterior disc osteophyte complexes and facet arthropathy.  Degenerative change most pronounced at the C5-C6 level where there is effacement of the anterior thecal sac and moderate/severe bilateral neural foraminal narrowing (right greater than left).  Following the administration of IV contrast, no pathologic foci of enhancement are appreciated.  Incidentally visualized soft tissue structures demonstrate a presumed sebaceous cyst in the right posterior extra thoracic soft tissues.     Thoracic:     There is redemonstration of abnormal marrow signal intensity and postcontrast enhancement involving the T9-T10 level consistent with patient's known osteomyelitis/discitis.  There is abnormal fluid signal and peripheral enhancement involving the T9-T10 intervertebral disc space concerning for associated disc space abscess.  There is abnormal signal intensity and postcontrast enhancement involving the anterior and posterior epidural spaces from the T8 through T10 level in keeping with associated epidural phlegmon.  There is associated mass effect on the thoracic spinal cord without definite abnormal intramedullary cord signal intensity.  Phlegmonous change appears to involve the T8-T9 and  T9-T10 neural foramina.  There is associated paravertebral phlegmonous change also at these levels.  There is subtle T2/STIR signal hyperintensity involving the T3-T4 intervertebral disc space noting this appears increased in conspicuity from prior examination.  This could reflect an additional region of discitis/osteomyelitis and attention on follow-up exam is advised.  Thoracic vertebral body alignment is stable and there is redemonstration of multilevel degenerative change.  Incidentally visualized intrathoracic structures demonstrate a moderate-sized hiatal hernia and left pleural fluid.     Lumbar spine:     There is grade 2 anterolisthesis of L5 on S1 secondary to bilateral L5 pars defects, unchanged from prior examination.  There is grade 1 anterolisthesis of L3 on L4, also unchanged.  Lumbar vertebral bodies demonstrate no evidence of acute fracture or infiltrating marrow replacement process.  There is multilevel intervertebral disc desiccation.  The conus medullaris is normal in morphology and terminates at the T12-L1 level.  Following the administration of IV contrast, no pathologic foci of enhancement are appreciated.  There is multilevel degenerative change of the lumbar spine similar to most recent MRI of 10/12/2020.  There is a subcentimeter right renal cortical T2 hyperintensity, likely a cyst.  There is fatty atrophy of the paraspinal musculature.    Impression:       1. Findings in keeping with known T9-T10 and osteomyelitis/discitis with associated intervertebral disc space abscess, epidural phlegmon with associated mass effect on the thoracic spinal cord and paravertebral phlegmon as discussed above.  Findings do not appear significantly improved when compared to most recent exam of 11/09/2020.   2. Subtle T2/STIR signal hyperintensity involving the T3-T4 intervertebral disc space, increased in conspicuity from prior examination. This could reflect an additional region of discitis/osteomyelitis  and attention on follow-up exam is advised.   3. No evidence to suggest osteomyelitis/discitis in the cervical or lumbar spine at this time.  Multilevel degenerative change of the cervical and lumbar spine.   4. Moderate size hiatal hernia and left pleural effusion.   This report was flagged in Epic as abnormal.     Electronically signed by resident: Leif Glover   Date: 12/02/2020   Time: 04:58     Electronically signed by: Christy Mcgee MD   Date: 12/02/2020   Time: 06:26   Imaging History    2020  Date Procedure Name Status Accession Number Location   12/03/20 03:16 PM CT Cervical Spine Without Contrast Final [id number] [facility]   12/02/20 04:03 AM MRI SPINE CERVICAL-THORACIC-LUMBAR W W/O CONTRAST (XPD) Final [id number] [facility]   11/30/20 02:10 PM CT Thoracic Spine Without Contrast Final [id number] [facility]   11/09/20 12:08 PM MRI Thoracic Spine W WO Cont Final [id number] [facility]

## 2020-12-07 NOTE — SUBJECTIVE & OBJECTIVE
Interval History: NAEON. AFVSS. Neuro exam stable. Reports pain in R knee today with movement after being turned last night. Otherwise no acute complaints. Denies new weakness and numbness/paresthesias. Urinating via Purewick, denies difficulty. Plan for OR tomorrow. Continue ceftaroline. Spoke with patient's daughter, updated on care.    Medications:  Continuous Infusions:  Scheduled Meds:   acetaminophen  1,000 mg Oral Q8H    ceftaroline fosamil  600 mg Intravenous Q8H    donepeziL  10 mg Oral QHS    DULoxetine  60 mg Oral Daily    heparin (porcine)  5,000 Units Subcutaneous Q8H    memantine  10 mg Oral BID    methocarbamoL  500 mg Oral QID    metoprolol succinate  50 mg Oral Daily    pantoprazole  40 mg Oral Before breakfast    pravastatin  40 mg Oral QHS    senna-docusate 8.6-50 mg  1 tablet Oral BID     PRN Meds:acetaminophen, albuterol-ipratropium, dextrose 50%, dextrose 50%, glucagon (human recombinant), glucose, glucose, glucose, insulin aspart U-100, ondansetron, oxyCODONE, oxyCODONE     Review of Systems   Constitutional: Negative for chills and fever.   Respiratory: Negative for cough and shortness of breath.    Gastrointestinal: Negative for nausea and vomiting.   Genitourinary: Negative for difficulty urinating and dysuria.   Musculoskeletal: Positive for arthralgias and back pain. Negative for neck pain.   Skin: Negative for rash and wound.   Neurological: Negative for weakness and numbness.   Psychiatric/Behavioral: Negative for behavioral problems and confusion.     Objective:     Weight: 125.4 kg (276 lb 7.3 oz)  Body mass index is 39.67 kg/m².  Vital Signs (Most Recent):  Temp: 98.1 °F (36.7 °C) (12/07/20 0359)  Pulse: (!) 51 (12/07/20 0359)  Resp: 16 (12/07/20 0642)  BP: 117/68 (12/07/20 0359)  SpO2: 96 % (12/07/20 0359) Vital Signs (24h Range):  Temp:  [97.9 °F (36.6 °C)-98.4 °F (36.9 °C)] 98.1 °F (36.7 °C)  Pulse:  [51-57] 51  Resp:  [16-20] 16  SpO2:  [94 %-97 %] 96 %  BP:  (114-124)/(56-68) 117/68                     Female External Urinary Catheter 12/01/20 2200 (Active)   Skin reddened 12/06/20 2000   Tolerance no signs/symptoms of discomfort 12/06/20 2000   Suction Continuous suction at 70 mmHg 12/06/20 2000   Date of last wick change 12/05/20 12/05/20 2000   Time of last wick change 2000 12/05/20 2000   Output (mL) 550 mL 12/07/20 0200       Neurosurgery Physical Exam    General: well developed, well nourished, no distress.   Head: normocephalic, atraumatic  Neck: No tracheal deviation. No palpable masses. Full ROM.   Neurologic: Alert and oriented. Thought content appropriate.  GCS: Motor: 6/Verbal: 5/Eyes: 4 GCS Total: 15  Mental Status: Awake, Alert, Oriented x 4  Language: No aphasia  Speech: No dysarthria  Cranial nerves: face symmetric, tongue midline, CN II-XII grossly intact.   Eyes: pupils equal, round, reactive to light with accomodation, EOMI.  Ears: No drainage.   Pulmonary: normal respirations, no signs of respiratory distress  Abdomen: soft, non-distended, not tender to palpation  Sensory: intact to light touch throughout  Motor Strength: Moves all extremities spontaneously with good tone.  Pain limited weakness in proximal BLE limiting exam, limited also by body habitus. No abnormal movements seen.      Strength   Deltoids Triceps Biceps Wrist Extension Wrist Flexion Hand    Upper: R 5/5 5/5 5/5 5/5 5/5 5/5     L 5/5 5/5 5/5 5/5 5/5 5/5       Iliopsoas Quadriceps Knee  Flexion Tibialis  anterior Gastro- cnemius EHL   Lower: R 3+/5 3+/5 4/5 5/5 5/5 5/5     L 4/5 4/5 4/5 5/5 5/5 5/5      Stein: absent  Clonus: absent  DTR: 2+ and symmetric in brachioradialis, biceps, patellar  Vascular: No LE edema.   Skin: Skin is warm, dry and intact.      Significant Labs:  Recent Labs   Lab 12/06/20  0707   GLU 88      K 3.8      CO2 29   BUN 12   CREATININE 1.0   CALCIUM 9.8     Recent Labs   Lab 12/06/20  0707   WBC 8.33   HGB 9.9*   HCT 34.1*         Recent Labs   Lab 12/07/20  0834   INR 1.0   APTT 28.4     Microbiology Results (last 7 days)     Procedure Component Value Units Date/Time    Blood culture [[id number]] Collected: 12/02/20 0020    Order Status: Completed Specimen: Blood Updated: 12/07/20 0612     Blood Culture, Routine No growth after 5 days.        All pertinent labs from the last 24 hours have been reviewed.    Significant Diagnostics:  I have reviewed and interpreted all pertinent imaging results/findings within the past 24 hours.

## 2020-12-08 ENCOUNTER — ANESTHESIA (OUTPATIENT)
Dept: SURGERY | Facility: HOSPITAL | Age: 70
DRG: 453 | End: 2020-12-08
Payer: MEDICARE

## 2020-12-08 LAB
BLD PROD TYP BPU: NORMAL
BLD PROD TYP BPU: NORMAL
BLOOD UNIT EXPIRATION DATE: NORMAL
BLOOD UNIT EXPIRATION DATE: NORMAL
BLOOD UNIT TYPE CODE: 5100
BLOOD UNIT TYPE CODE: 5100
BLOOD UNIT TYPE: NORMAL
BLOOD UNIT TYPE: NORMAL
CODING SYSTEM: NORMAL
CODING SYSTEM: NORMAL
DISPENSE STATUS: NORMAL
DISPENSE STATUS: NORMAL
GLUCOSE SERPL-MCNC: 101 MG/DL (ref 70–110)
GLUCOSE SERPL-MCNC: 94 MG/DL (ref 70–110)
GRAM STN SPEC: NORMAL
HCO3 UR-SCNC: 24.4 MMOL/L (ref 24–28)
HCO3 UR-SCNC: 31.4 MMOL/L (ref 24–28)
HCT VFR BLD CALC: 30 %PCV (ref 36–54)
HCT VFR BLD CALC: 33 %PCV (ref 36–54)
NUM UNITS TRANS PACKED RBC: NORMAL
PCO2 BLDA: 37.3 MMHG (ref 35–45)
PCO2 BLDA: 45.1 MMHG (ref 35–45)
PH SMN: 7.42 [PH] (ref 7.35–7.45)
PH SMN: 7.45 [PH] (ref 7.35–7.45)
PO2 BLDA: 208 MMHG (ref 80–100)
PO2 BLDA: 466 MMHG (ref 80–100)
POC BE: 0 MMOL/L
POC BE: 7 MMOL/L
POC IONIZED CALCIUM: 1.4 MMOL/L (ref 1.06–1.42)
POC IONIZED CALCIUM: 1.47 MMOL/L (ref 1.06–1.42)
POC SATURATED O2: 100 % (ref 95–100)
POC SATURATED O2: 100 % (ref 95–100)
POC TCO2: 26 MMOL/L (ref 23–27)
POC TCO2: 33 MMOL/L (ref 23–27)
POTASSIUM BLD-SCNC: 3.2 MMOL/L (ref 3.5–5.1)
POTASSIUM BLD-SCNC: 3.7 MMOL/L (ref 3.5–5.1)
SAMPLE: ABNORMAL
SAMPLE: ABNORMAL
SODIUM BLD-SCNC: 135 MMOL/L (ref 136–145)
SODIUM BLD-SCNC: 138 MMOL/L (ref 136–145)
TRANS ERYTHROCYTES VOL PATIENT: NORMAL ML

## 2020-12-08 PROCEDURE — 25000003 PHARM REV CODE 250: Performed by: PHYSICIAN ASSISTANT

## 2020-12-08 PROCEDURE — 27000221 HC OXYGEN, UP TO 24 HOURS

## 2020-12-08 PROCEDURE — 27201037 HC PRESSURE MONITORING SET UP

## 2020-12-08 PROCEDURE — 22614 ARTHRD PST TQ 1NTRSPC EA ADD: CPT | Mod: 62,,, | Performed by: NEUROLOGICAL SURGERY

## 2020-12-08 PROCEDURE — 63101 REMOVE VERT BODY DCMPRN THRC: CPT | Mod: 22,62,, | Performed by: NEUROLOGICAL SURGERY

## 2020-12-08 PROCEDURE — 63101 PR REMV VERT BODY, LATERAL, THORACIC: ICD-10-PCS | Mod: 22,62,, | Performed by: NEUROLOGICAL SURGERY

## 2020-12-08 PROCEDURE — 12000002 HC ACUTE/MED SURGE SEMI-PRIVATE ROOM

## 2020-12-08 PROCEDURE — 87205 SMEAR GRAM STAIN: CPT

## 2020-12-08 PROCEDURE — 22532 PR ARTHRODESIS LATERAL EXTRACAVITARY THORACIC: ICD-10-PCS | Mod: 62,51,, | Performed by: NEUROLOGICAL SURGERY

## 2020-12-08 PROCEDURE — 63103 REMOVE VERTEBRAL BODY ADD-ON: CPT | Mod: 62,,, | Performed by: NEUROLOGICAL SURGERY

## 2020-12-08 PROCEDURE — 22610 ARTHRD PST TQ 1NTRSPC THRC: CPT | Mod: 62,51,, | Performed by: NEUROLOGICAL SURGERY

## 2020-12-08 PROCEDURE — C1713 ANCHOR/SCREW BN/BN,TIS/BN: HCPCS | Performed by: NEUROLOGICAL SURGERY

## 2020-12-08 PROCEDURE — 22532 ARTHRD LAT XTRCVTRY TQ THRC: CPT | Mod: 62,51,, | Performed by: NEUROLOGICAL SURGERY

## 2020-12-08 PROCEDURE — 20930 SP BONE ALGRFT MORSEL ADD-ON: CPT | Mod: ,,, | Performed by: NEUROLOGICAL SURGERY

## 2020-12-08 PROCEDURE — 71000039 HC RECOVERY, EACH ADD'L HOUR: Performed by: NEUROLOGICAL SURGERY

## 2020-12-08 PROCEDURE — 22854 PR INS BIOMECH DEV, VERT CORPECTOMY W/INTRBODY ARTHRODESIS EA INTERSPC: ICD-10-PCS | Mod: ,,, | Performed by: NEUROLOGICAL SURGERY

## 2020-12-08 PROCEDURE — 22610 PR ARTHRODESIS, POST/POSTLAT, SNGL INTERSPACE, THORACIC: ICD-10-PCS | Mod: 62,51,, | Performed by: NEUROLOGICAL SURGERY

## 2020-12-08 PROCEDURE — C1729 CATH, DRAINAGE: HCPCS | Performed by: NEUROLOGICAL SURGERY

## 2020-12-08 PROCEDURE — D9220A PRA ANESTHESIA: ICD-10-PCS | Mod: ANES,,, | Performed by: STUDENT IN AN ORGANIZED HEALTH CARE EDUCATION/TRAINING PROGRAM

## 2020-12-08 PROCEDURE — 22854 INSJ BIOMECHANICAL DEVICE: CPT | Mod: 82,,, | Performed by: NEUROLOGICAL SURGERY

## 2020-12-08 PROCEDURE — 94761 N-INVAS EAR/PLS OXIMETRY MLT: CPT

## 2020-12-08 PROCEDURE — 22842 INSERT SPINE FIXATION DEVICE: CPT | Mod: 82,,, | Performed by: NEUROLOGICAL SURGERY

## 2020-12-08 PROCEDURE — 25000003 PHARM REV CODE 250: Performed by: STUDENT IN AN ORGANIZED HEALTH CARE EDUCATION/TRAINING PROGRAM

## 2020-12-08 PROCEDURE — 25000003 PHARM REV CODE 250: Performed by: NURSE PRACTITIONER

## 2020-12-08 PROCEDURE — 63600175 PHARM REV CODE 636 W HCPCS: Performed by: STUDENT IN AN ORGANIZED HEALTH CARE EDUCATION/TRAINING PROGRAM

## 2020-12-08 PROCEDURE — 63600175 PHARM REV CODE 636 W HCPCS: Performed by: NEUROLOGICAL SURGERY

## 2020-12-08 PROCEDURE — 22842 PR POSTERIOR SEGMENTAL INSTRUMENTATION 3-6 VRT SEG: ICD-10-PCS | Mod: ,,, | Performed by: NEUROLOGICAL SURGERY

## 2020-12-08 PROCEDURE — 37000008 HC ANESTHESIA 1ST 15 MINUTES: Performed by: NEUROLOGICAL SURGERY

## 2020-12-08 PROCEDURE — 63600175 PHARM REV CODE 636 W HCPCS: Mod: JG | Performed by: PHYSICIAN ASSISTANT

## 2020-12-08 PROCEDURE — 61783 SCAN PROC SPINAL: CPT | Mod: ,,, | Performed by: NEUROLOGICAL SURGERY

## 2020-12-08 PROCEDURE — 99233 PR SUBSEQUENT HOSPITAL CARE,LEVL III: ICD-10-PCS | Mod: ,,, | Performed by: PHYSICIAN ASSISTANT

## 2020-12-08 PROCEDURE — 87176 TISSUE HOMOGENIZATION CULTR: CPT

## 2020-12-08 PROCEDURE — 22854 PR INS BIOMECH DEV, VERT CORPECTOMY W/INTRBODY ARTHRODESIS EA INTERSPC: ICD-10-PCS | Mod: 82,,, | Performed by: NEUROLOGICAL SURGERY

## 2020-12-08 PROCEDURE — 27800903 OPTIME MED/SURG SUP & DEVICES OTHER IMPLANTS: Performed by: NEUROLOGICAL SURGERY

## 2020-12-08 PROCEDURE — 22614 PR ARTHRODESIS, POST/POSTLAT, SNGL INTERSPACE, EA ADDTL: ICD-10-PCS | Mod: 62,,, | Performed by: NEUROLOGICAL SURGERY

## 2020-12-08 PROCEDURE — 36620 PR INSERT CATH,ART,PERCUT,SHORTTERM: ICD-10-PCS | Mod: 59,,, | Performed by: STUDENT IN AN ORGANIZED HEALTH CARE EDUCATION/TRAINING PROGRAM

## 2020-12-08 PROCEDURE — 22842 PR POSTERIOR SEGMENTAL INSTRUMENTATION 3-6 VRT SEG: ICD-10-PCS | Mod: 82,,, | Performed by: NEUROLOGICAL SURGERY

## 2020-12-08 PROCEDURE — D9220A PRA ANESTHESIA: Mod: CRNA,,, | Performed by: NURSE ANESTHETIST, CERTIFIED REGISTERED

## 2020-12-08 PROCEDURE — 37000009 HC ANESTHESIA EA ADD 15 MINS: Performed by: NEUROLOGICAL SURGERY

## 2020-12-08 PROCEDURE — D9220A PRA ANESTHESIA: ICD-10-PCS | Mod: CRNA,,, | Performed by: NURSE ANESTHETIST, CERTIFIED REGISTERED

## 2020-12-08 PROCEDURE — 63103 PR REMOVE VERTEBRAL BODY ADD-ON: ICD-10-PCS | Mod: 62,,, | Performed by: NEUROLOGICAL SURGERY

## 2020-12-08 PROCEDURE — 87070 CULTURE OTHR SPECIMN AEROBIC: CPT | Mod: 59

## 2020-12-08 PROCEDURE — 71000033 HC RECOVERY, INTIAL HOUR: Performed by: NEUROLOGICAL SURGERY

## 2020-12-08 PROCEDURE — 25000003 PHARM REV CODE 250: Performed by: NEUROLOGICAL SURGERY

## 2020-12-08 PROCEDURE — D9220A PRA ANESTHESIA: Mod: ANES,,, | Performed by: STUDENT IN AN ORGANIZED HEALTH CARE EDUCATION/TRAINING PROGRAM

## 2020-12-08 PROCEDURE — 99223 1ST HOSP IP/OBS HIGH 75: CPT | Mod: ,,, | Performed by: NURSE PRACTITIONER

## 2020-12-08 PROCEDURE — 20930 PR ALLOGRAFT FOR SPINE SURGERY ONLY MORSELIZED: ICD-10-PCS | Mod: ,,, | Performed by: NEUROLOGICAL SURGERY

## 2020-12-08 PROCEDURE — 22854 INSJ BIOMECHANICAL DEVICE: CPT | Mod: ,,, | Performed by: NEUROLOGICAL SURGERY

## 2020-12-08 PROCEDURE — 87102 FUNGUS ISOLATION CULTURE: CPT

## 2020-12-08 PROCEDURE — 61783 PR STEREOTACTIC COMP ASSIST PROC,SPINAL: ICD-10-PCS | Mod: ,,, | Performed by: NEUROLOGICAL SURGERY

## 2020-12-08 PROCEDURE — 36000710: Performed by: NEUROLOGICAL SURGERY

## 2020-12-08 PROCEDURE — 25000003 PHARM REV CODE 250: Performed by: NURSE ANESTHETIST, CERTIFIED REGISTERED

## 2020-12-08 PROCEDURE — 99900035 HC TECH TIME PER 15 MIN (STAT)

## 2020-12-08 PROCEDURE — 87075 CULTR BACTERIA EXCEPT BLOOD: CPT

## 2020-12-08 PROCEDURE — 27201423 OPTIME MED/SURG SUP & DEVICES STERILE SUPPLY: Performed by: NEUROLOGICAL SURGERY

## 2020-12-08 PROCEDURE — 36000711: Performed by: NEUROLOGICAL SURGERY

## 2020-12-08 PROCEDURE — 99233 SBSQ HOSP IP/OBS HIGH 50: CPT | Mod: ,,, | Performed by: PHYSICIAN ASSISTANT

## 2020-12-08 PROCEDURE — 22842 INSERT SPINE FIXATION DEVICE: CPT | Mod: ,,, | Performed by: NEUROLOGICAL SURGERY

## 2020-12-08 PROCEDURE — 63600175 PHARM REV CODE 636 W HCPCS: Performed by: NURSE ANESTHETIST, CERTIFIED REGISTERED

## 2020-12-08 PROCEDURE — 36620 INSERTION CATHETER ARTERY: CPT | Mod: 59,,, | Performed by: STUDENT IN AN ORGANIZED HEALTH CARE EDUCATION/TRAINING PROGRAM

## 2020-12-08 PROCEDURE — 99223 PR INITIAL HOSPITAL CARE,LEVL III: ICD-10-PCS | Mod: ,,, | Performed by: NURSE PRACTITIONER

## 2020-12-08 DEVICE — ROD SPINAL PRECUT 5.5 X 480MM: Type: IMPLANTABLE DEVICE | Site: SPINE THORACIC | Status: FUNCTIONAL

## 2020-12-08 DEVICE — SCREW EXPEDIUM FEN STRL 5X40MM: Type: IMPLANTABLE DEVICE | Site: SPINE THORACIC | Status: FUNCTIONAL

## 2020-12-08 DEVICE — SET SCREW EXPEDIUM VERSE UNITZ: Type: IMPLANTABLE DEVICE | Site: SPINE THORACIC | Status: FUNCTIONAL

## 2020-12-08 DEVICE — SCREW INNER SINGLE SET TITANIU: Type: IMPLANTABLE DEVICE | Site: SPINE THORACIC | Status: FUNCTIONAL

## 2020-12-08 DEVICE — SCREW BONE SPINAL 5.5 5 X 40MM: Type: IMPLANTABLE DEVICE | Site: SPINE THORACIC | Status: FUNCTIONAL

## 2020-12-08 DEVICE — MATRIX BONE CELLR VIVIGEN 10CC: Type: IMPLANTABLE DEVICE | Site: SPINE THORACIC | Status: FUNCTIONAL

## 2020-12-08 DEVICE — IMPLANTABLE DEVICE: Type: IMPLANTABLE DEVICE | Site: SPINE THORACIC | Status: FUNCTIONAL

## 2020-12-08 RX ORDER — PROPOFOL 10 MG/ML
VIAL (ML) INTRAVENOUS
Status: DISCONTINUED | OUTPATIENT
Start: 2020-12-08 | End: 2020-12-08

## 2020-12-08 RX ORDER — PROPOFOL 10 MG/ML
VIAL (ML) INTRAVENOUS CONTINUOUS PRN
Status: DISCONTINUED | OUTPATIENT
Start: 2020-12-08 | End: 2020-12-08

## 2020-12-08 RX ORDER — LABETALOL HYDROCHLORIDE 5 MG/ML
10 INJECTION, SOLUTION INTRAVENOUS EVERY 4 HOURS PRN
Status: DISCONTINUED | OUTPATIENT
Start: 2020-12-08 | End: 2020-12-18 | Stop reason: HOSPADM

## 2020-12-08 RX ORDER — VANCOMYCIN HYDROCHLORIDE 1 G/20ML
INJECTION, POWDER, LYOPHILIZED, FOR SOLUTION INTRAVENOUS
Status: DISCONTINUED | OUTPATIENT
Start: 2020-12-08 | End: 2020-12-08 | Stop reason: HOSPADM

## 2020-12-08 RX ORDER — DEXAMETHASONE SODIUM PHOSPHATE 4 MG/ML
INJECTION, SOLUTION INTRA-ARTICULAR; INTRALESIONAL; INTRAMUSCULAR; INTRAVENOUS; SOFT TISSUE
Status: DISCONTINUED | OUTPATIENT
Start: 2020-12-08 | End: 2020-12-08

## 2020-12-08 RX ORDER — SODIUM CHLORIDE 0.9 % (FLUSH) 0.9 %
10 SYRINGE (ML) INJECTION
Status: DISCONTINUED | OUTPATIENT
Start: 2020-12-08 | End: 2020-12-09 | Stop reason: HOSPADM

## 2020-12-08 RX ORDER — NEOSTIGMINE METHYLSULFATE 0.5 MG/ML
INJECTION, SOLUTION INTRAVENOUS
Status: DISCONTINUED | OUTPATIENT
Start: 2020-12-08 | End: 2020-12-08

## 2020-12-08 RX ORDER — HYDROMORPHONE HYDROCHLORIDE 1 MG/ML
0.2 INJECTION, SOLUTION INTRAMUSCULAR; INTRAVENOUS; SUBCUTANEOUS EVERY 5 MIN PRN
Status: COMPLETED | OUTPATIENT
Start: 2020-12-08 | End: 2020-12-08

## 2020-12-08 RX ORDER — MORPHINE SULFATE 2 MG/ML
2 INJECTION, SOLUTION INTRAMUSCULAR; INTRAVENOUS
Status: DISCONTINUED | OUTPATIENT
Start: 2020-12-08 | End: 2020-12-18 | Stop reason: HOSPADM

## 2020-12-08 RX ORDER — MAG HYDROX/ALUMINUM HYD/SIMETH 200-200-20
30 SUSPENSION, ORAL (FINAL DOSE FORM) ORAL EVERY 4 HOURS PRN
Status: DISCONTINUED | OUTPATIENT
Start: 2020-12-08 | End: 2020-12-18 | Stop reason: HOSPADM

## 2020-12-08 RX ORDER — SUCCINYLCHOLINE CHLORIDE 20 MG/ML
INJECTION INTRAMUSCULAR; INTRAVENOUS
Status: DISCONTINUED | OUTPATIENT
Start: 2020-12-08 | End: 2020-12-08

## 2020-12-08 RX ORDER — TRANEXAMIC ACID 100 MG/ML
INJECTION, SOLUTION INTRAVENOUS
Status: DISCONTINUED | OUTPATIENT
Start: 2020-12-08 | End: 2020-12-08

## 2020-12-08 RX ORDER — SODIUM CHLORIDE 9 MG/ML
INJECTION, SOLUTION INTRAVENOUS CONTINUOUS
Status: DISCONTINUED | OUTPATIENT
Start: 2020-12-08 | End: 2020-12-11

## 2020-12-08 RX ORDER — SODIUM CHLORIDE 9 MG/ML
INJECTION, SOLUTION INTRAVENOUS CONTINUOUS PRN
Status: DISCONTINUED | OUTPATIENT
Start: 2020-12-08 | End: 2020-12-08

## 2020-12-08 RX ORDER — MUPIROCIN 20 MG/G
OINTMENT TOPICAL 2 TIMES DAILY
Status: DISPENSED | OUTPATIENT
Start: 2020-12-08 | End: 2020-12-13

## 2020-12-08 RX ORDER — BISACODYL 10 MG
10 SUPPOSITORY, RECTAL RECTAL DAILY
Status: DISCONTINUED | OUTPATIENT
Start: 2020-12-08 | End: 2020-12-18 | Stop reason: HOSPADM

## 2020-12-08 RX ORDER — LIDOCAINE HYDROCHLORIDE AND EPINEPHRINE 10; 10 MG/ML; UG/ML
INJECTION, SOLUTION INFILTRATION; PERINEURAL
Status: DISCONTINUED | OUTPATIENT
Start: 2020-12-08 | End: 2020-12-08 | Stop reason: HOSPADM

## 2020-12-08 RX ORDER — FENTANYL CITRATE 50 UG/ML
INJECTION, SOLUTION INTRAMUSCULAR; INTRAVENOUS
Status: DISCONTINUED | OUTPATIENT
Start: 2020-12-08 | End: 2020-12-08

## 2020-12-08 RX ORDER — AMOXICILLIN 250 MG
2 CAPSULE ORAL NIGHTLY PRN
Status: DISCONTINUED | OUTPATIENT
Start: 2020-12-08 | End: 2020-12-18 | Stop reason: HOSPADM

## 2020-12-08 RX ORDER — CEFAZOLIN SODIUM 1 G/3ML
2 INJECTION, POWDER, FOR SOLUTION INTRAMUSCULAR; INTRAVENOUS
Status: DISCONTINUED | OUTPATIENT
Start: 2020-12-08 | End: 2020-12-08

## 2020-12-08 RX ORDER — TRANEXAMIC ACID 100 MG/ML
INJECTION, SOLUTION INTRAVENOUS CONTINUOUS PRN
Status: DISCONTINUED | OUTPATIENT
Start: 2020-12-08 | End: 2020-12-08

## 2020-12-08 RX ORDER — FAMOTIDINE 10 MG/ML
INJECTION INTRAVENOUS
Status: DISCONTINUED | OUTPATIENT
Start: 2020-12-08 | End: 2020-12-08

## 2020-12-08 RX ORDER — PHENYLEPHRINE HYDROCHLORIDE 10 MG/ML
INJECTION INTRAVENOUS
Status: DISCONTINUED | OUTPATIENT
Start: 2020-12-08 | End: 2020-12-08

## 2020-12-08 RX ORDER — CEFAZOLIN SODIUM 1 G/3ML
INJECTION, POWDER, FOR SOLUTION INTRAMUSCULAR; INTRAVENOUS
Status: DISCONTINUED | OUTPATIENT
Start: 2020-12-08 | End: 2020-12-08

## 2020-12-08 RX ORDER — LIDOCAINE HYDROCHLORIDE 20 MG/ML
INJECTION INTRAVENOUS
Status: DISCONTINUED | OUTPATIENT
Start: 2020-12-08 | End: 2020-12-08

## 2020-12-08 RX ORDER — ROCURONIUM BROMIDE 10 MG/ML
INJECTION, SOLUTION INTRAVENOUS
Status: DISCONTINUED | OUTPATIENT
Start: 2020-12-08 | End: 2020-12-08

## 2020-12-08 RX ADMIN — SODIUM CHLORIDE 1 MG/KG/HR: 9 INJECTION, SOLUTION INTRAVENOUS at 07:12

## 2020-12-08 RX ADMIN — SODIUM CHLORIDE, SODIUM GLUCONATE, SODIUM ACETATE, POTASSIUM CHLORIDE, MAGNESIUM CHLORIDE, SODIUM PHOSPHATE, DIBASIC, AND POTASSIUM PHOSPHATE: .53; .5; .37; .037; .03; .012; .00082 INJECTION, SOLUTION INTRAVENOUS at 07:12

## 2020-12-08 RX ADMIN — CEFAZOLIN 2 G: 1 INJECTION, POWDER, FOR SOLUTION INTRAMUSCULAR; INTRAVENOUS at 02:12

## 2020-12-08 RX ADMIN — OXYCODONE HYDROCHLORIDE 10 MG: 10 TABLET ORAL at 08:12

## 2020-12-08 RX ADMIN — ACETAMINOPHEN 1000 MG: 500 TABLET ORAL at 09:12

## 2020-12-08 RX ADMIN — HYDROMORPHONE HYDROCHLORIDE 0.2 MG: 1 INJECTION, SOLUTION INTRAMUSCULAR; INTRAVENOUS; SUBCUTANEOUS at 02:12

## 2020-12-08 RX ADMIN — OXYCODONE HYDROCHLORIDE 10 MG: 10 TABLET ORAL at 01:12

## 2020-12-08 RX ADMIN — ACETAMINOPHEN 1000 MG: 500 TABLET ORAL at 01:12

## 2020-12-08 RX ADMIN — SODIUM CHLORIDE 50 ML/HR: 0.9 INJECTION, SOLUTION INTRAVENOUS at 05:12

## 2020-12-08 RX ADMIN — TRANEXAMIC ACID 1000 MG: 100 INJECTION, SOLUTION INTRAVENOUS at 08:12

## 2020-12-08 RX ADMIN — HYDROMORPHONE HYDROCHLORIDE 0.2 MG: 1 INJECTION, SOLUTION INTRAMUSCULAR; INTRAVENOUS; SUBCUTANEOUS at 01:12

## 2020-12-08 RX ADMIN — REMIFENTANIL HYDROCHLORIDE 0.05 MCG/KG/MIN: 1 INJECTION, POWDER, LYOPHILIZED, FOR SOLUTION INTRAVENOUS at 07:12

## 2020-12-08 RX ADMIN — METHOCARBAMOL 500 MG: 100 INJECTION INTRAMUSCULAR; INTRAVENOUS at 02:12

## 2020-12-08 RX ADMIN — PRAVASTATIN SODIUM 40 MG: 40 TABLET ORAL at 09:12

## 2020-12-08 RX ADMIN — PROPOFOL 100 MCG/KG/MIN: 10 INJECTION, EMULSION INTRAVENOUS at 07:12

## 2020-12-08 RX ADMIN — SODIUM CHLORIDE 0.5 MCG/KG/MIN: 9 INJECTION, SOLUTION INTRAVENOUS at 07:12

## 2020-12-08 RX ADMIN — NEOSTIGMINE METHYLSULFATE 4 MG: 0.5 INJECTION INTRAVENOUS at 12:12

## 2020-12-08 RX ADMIN — ROCURONIUM BROMIDE 40 MG: 10 INJECTION, SOLUTION INTRAVENOUS at 09:12

## 2020-12-08 RX ADMIN — PROPOFOL 80 MG: 10 INJECTION, EMULSION INTRAVENOUS at 07:12

## 2020-12-08 RX ADMIN — SODIUM CHLORIDE: 9 INJECTION, SOLUTION INTRAVENOUS at 07:12

## 2020-12-08 RX ADMIN — FENTANYL CITRATE 25 MCG: 50 INJECTION, SOLUTION INTRAMUSCULAR; INTRAVENOUS at 01:12

## 2020-12-08 RX ADMIN — PHENYLEPHRINE HYDROCHLORIDE 200 MCG: 10 INJECTION INTRAVENOUS at 07:12

## 2020-12-08 RX ADMIN — METHOCARBAMOL 500 MG: 500 TABLET ORAL at 09:12

## 2020-12-08 RX ADMIN — LIDOCAINE HYDROCHLORIDE 100 MG: 20 INJECTION, SOLUTION INTRAVENOUS at 07:12

## 2020-12-08 RX ADMIN — FENTANYL CITRATE 100 MCG: 50 INJECTION, SOLUTION INTRAMUSCULAR; INTRAVENOUS at 07:12

## 2020-12-08 RX ADMIN — FENTANYL CITRATE 50 MCG: 50 INJECTION, SOLUTION INTRAMUSCULAR; INTRAVENOUS at 01:12

## 2020-12-08 RX ADMIN — FAMOTIDINE 20 MG: 10 INJECTION INTRAVENOUS at 01:12

## 2020-12-08 RX ADMIN — CEFTAROLINE FOSAMIL 600 MG: 600 POWDER, FOR SOLUTION INTRAVENOUS at 05:12

## 2020-12-08 RX ADMIN — CEFTAROLINE FOSAMIL 600 MG: 600 POWDER, FOR SOLUTION INTRAVENOUS at 02:12

## 2020-12-08 RX ADMIN — SENNOSIDES AND DOCUSATE SODIUM 1 TABLET: 8.6; 5 TABLET ORAL at 09:12

## 2020-12-08 RX ADMIN — DEXAMETHASONE SODIUM PHOSPHATE 8 MG: 4 INJECTION INTRA-ARTICULAR; INTRALESIONAL; INTRAMUSCULAR; INTRAVENOUS; SOFT TISSUE at 09:12

## 2020-12-08 RX ADMIN — MEMANTINE HYDROCHLORIDE 10 MG: 10 TABLET ORAL at 10:12

## 2020-12-08 RX ADMIN — SUCCINYLCHOLINE CHLORIDE 120 MG: 20 INJECTION, SOLUTION INTRAMUSCULAR; INTRAVENOUS at 07:12

## 2020-12-08 RX ADMIN — MUPIROCIN: 20 OINTMENT TOPICAL at 09:12

## 2020-12-08 RX ADMIN — CEFAZOLIN 3 G: 330 INJECTION, POWDER, FOR SOLUTION INTRAMUSCULAR; INTRAVENOUS at 08:12

## 2020-12-08 RX ADMIN — METHOCARBAMOL 500 MG: 500 TABLET ORAL at 04:12

## 2020-12-08 RX ADMIN — ACETAMINOPHEN 1000 MG: 500 TABLET ORAL at 06:12

## 2020-12-08 RX ADMIN — DONEPEZIL HYDROCHLORIDE 10 MG: 10 TABLET ORAL at 09:12

## 2020-12-08 RX ADMIN — ROCURONIUM BROMIDE 5 MG: 10 INJECTION, SOLUTION INTRAVENOUS at 07:12

## 2020-12-08 NOTE — TRANSFER OF CARE
"Anesthesia Transfer of Care Note    Patient: Martina Betancourt    Procedure(s) Performed: Procedure(s) (LRB):  T9-T10 corpectomy, posterior instrumented fusion T7-T12. (N/A)    Patient location: PACU    Anesthesia Type: general    Transport from OR: Transported from OR on 6-10 L/min O2 by face mask with adequate spontaneous ventilation    Post pain: adequate analgesia    Post assessment: no apparent anesthetic complications and tolerated procedure well    Post vital signs: stable    Level of consciousness: sedated and responds to stimulation    Nausea/Vomiting: no nausea/vomiting    Complications: none    Transfer of care protocol was followed      Last vitals:   Visit Vitals  BP (!) 103/58   Pulse 67   Temp 36.2 °C (97.2 °F) (Temporal)   Resp (!) 22   Ht 5' 10" (1.778 m)   Wt 125.4 kg (276 lb 7.3 oz)   SpO2 100%   Breastfeeding No   BMI 39.67 kg/m²     "

## 2020-12-08 NOTE — ASSESSMENT & PLAN NOTE
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     Vancomycin discontinued and Ceftaroline started. Blood cx are negative. She is afebrile. No leukocytosis. NSGY is planning for I&D, T9-T10 corpectomy and fusion today    Plan:  1. Continue ceftaroline  2. When taken to the OR, please send bone/tissue for path, gram stain, aerobic, anaerobic, AFB and fungal cultures   3. Anticipate 6 weeks of IV antibiotics   4. ID will follow.

## 2020-12-08 NOTE — H&P
[facility]  Neurocritical Care  Progress Note    Admit Date: 12/1/2020  Service Date: 12/08/2020  Length of Stay: 7    Subjective:     Chief Complaint: Osteomyelitis of thoracic vertebra    History of Present Illness:    70 y.o. female known to [facility] with PMH of Alzheimer's dz, HTN, HLD, CAD, YOVANI, and morbid obesity who was recently hospitalized x2 for MRSA bacteremia complicated by pneumonia and possible UTI, treated with linozolid x8 days and cipro and discharged 10/1. She was then readmitted to [facility] on 10/8 with severe back pain. MRI revealed T9-10 osteodiscitis and T8-10 epidural phlegmon with associated paraverterbral abscesses. Spine infection likely hematogenous from under-treated bacteremia. Patient had no neurologic deficits on exam, no indications for emergent neurosurgical intervention, plan was made for conservative non-surgical treatment. She underwent needle aspiration of T9-10 disc space on 10/16, cultures were negative although she had already been undergoing antibiotic treatment. Repeat BCx remained no growth and she remained afebrile and neurologically stable. Patient was discharged to [facility] on 10/20 with plan for Vancomycin IV x 8 weeks (estimated end date 12/3).       Patient presents 12/1 after interval imaging obtained 11/30 revealed worsening bony destruction, unstable T9 fracture. Transferred to [facility] for [place]GY eval.        12/8/2020 will admit to [facility] S/P  T9-10 corpectomy; T7-12 posterior fusion POD#0. Wound vac to incision for 5days and hemovac drains x2 to full suction.        Hospital Course: 12/12/2020 admit to [facility]. S/P T9-10 corpectomy; T7-12 posterior fusion POD#0, 2 hemovac drains to full suction and wound vac to incision for 5 days. Thoracic spine AP xray post-op, pending.    Past Medical History:   Diagnosis Date    Alzheimer disease     Alzheimer disease     Coronary artery disease     Hyperlipidemia     Hypertension     Myopathy     Non-ST  elevation (NSTEMI) myocardial infarction     Obesity     Pneumonia     Sleep apnea      Past Surgical History:   Procedure Laterality Date    APPENDECTOMY      APPENDECTOMY      CHOLECYSTECTOMY      CORONARY STENT PLACEMENT      HYSTERECTOMY      TONSILLECTOMY         No current facility-administered medications on file prior to encounter.      Current Outpatient Medications on File Prior to Encounter   Medication Sig Dispense Refill    acetaminophen (TYLENOL) 325 MG tablet Take 2 tablets (650 mg total) by mouth every 6 (six) hours as needed (HEADACHE, TEMPERATURE - FEVER > 100.4).  0    albuterol-ipratropium (DUO-NEB) 2.5 mg-0.5 mg/3 mL nebulizer solution Take 3 mLs by nebulization every 6 (six) hours as needed for Wheezing or Shortness of Breath. Rescue 1 Box 0    aluminum & magnesium hydroxide-simethicone (MYLANTA MAX STRENGTH) 400-400-40 mg/5 mL suspension Take 30 mLs by mouth every 6 (six) hours as needed for Indigestion.  0    aspirin (ECOTRIN) 81 MG EC tablet Take 81 mg by mouth once daily.      bisacodyL (DULCOLAX) 5 mg EC tablet Take 5 mg by mouth daily as needed for Constipation.      calcium carbonate (TUMS) 200 mg calcium (500 mg) chewable tablet Take 1 tablet (500 mg total) by mouth 2 (two) times daily as needed.      clopidogreL (PLAVIX) 75 mg tablet Take 1 tablet (75 mg total) by mouth once daily. 30 tablet 11    donepeziL (ARICEPT) 10 MG tablet Take 10 mg by mouth every evening.      DULoxetine (CYMBALTA) 60 MG capsule Take 60 mg by mouth once daily.      enoxaparin (LOVENOX) 40 mg/0.4 mL Syrg Inject 0.4 mLs (40 mg total) into the skin once daily. 4 mL 0    ferrous sulfate 324 mg (65 mg iron) TbEC Take 325 mg by mouth every evening.      furosemide (LASIX) 40 MG tablet Take 1 tablet (40 mg total) by mouth once daily. 30 tablet 11    guaiFENesin (MUCINEX) 600 mg 12 hr tablet Take 1,200 mg by mouth 2 (two) times daily.      HYDROcodone-acetaminophen (NORCO)  mg per tablet  Take 1 tablet by mouth every 6 (six) hours as needed for Pain.      isosorbide mononitrate (IMDUR) 60 MG 24 hr tablet Take 60 mg by mouth once daily.      lisinopriL 10 MG tablet Take 1 tablet (10 mg total) by mouth once daily. 30 tablet 0    melatonin (MELATIN) 3 mg tablet Take 2 tablets (6 mg total) by mouth nightly as needed for Insomnia.  0    memantine (NAMENDA) 10 MG Tab Take 1 tablet (10 mg total) by mouth 2 (two) times daily. 60 tablet 0    methocarbamoL (ROBAXIN) 500 MG Tab Take 500 mg by mouth 4 (four) times daily.      metoprolol succinate (TOPROL-XL) 50 MG 24 hr tablet Take 50 mg by mouth once daily.      miconazole nitrate 2% (MICOTIN) 2 % Oint Apply topically 2 (two) times daily.  0    ondansetron (ZOFRAN-ODT) 4 MG TbDL Take 1 tablet (4 mg total) by mouth every 8 (eight) hours as needed. 12 tablet 0    pantoprazole (PROTONIX) 40 MG tablet Take 1 tablet (40 mg total) by mouth before breakfast. 30 tablet 11    pravastatin (PRAVACHOL) 40 MG tablet Take 40 mg by mouth every evening.      sodium chloride 0.9% (NORMAL SALINE FLUSH) injection Inject 10 mLs into the vein as needed. 100 mL 0    vancomycin HCl in 5 % dextrose (VANCOMYCIN IN DEXTROSE 5 %) 1 gram/250 mL Soln Inject 250 mLs (1,000 mg total) into the vein once daily. 250 mL 0     Allergies: Hydroxyzine hcl and Tizanidine    History reviewed. No pertinent family history.    Social History     Tobacco Use    Smoking status: Unknown If Ever Smoked   Substance Use Topics    Alcohol use: Not Currently    Drug use: Never      Review of Systems: Review of Symptoms:  Constitutional:activity change Denies fevers, weight loss, chills, or weakness.  Eyes: Denies changes in vision.  ENT: Denies dysphagia, nasal discharge, ear pain or discharge.  Cardiovascular: Denies chest pain, palpitations, orthopnea, or claudication.  Respiratory: Denies shortness of breath, cough, hemoptysis, or wheezing.  GI: constipation Denies nausea/vomitting,  hematochezia, melena, abd pain, or changes in appetite.  : Denies dysuria, incontinence, or hematuria.  Musculoskeletal: back pain, Denies joint pain or myalgias.  Skin/breast: Denies rashes, lumps, lesions, or discharge.  Neurologic: Denies headache, dizziness, vertigo, or paresthesias.  Psychiatric: Denies changes in mood or hallucinations.  Endocrine: Denies polyuria, polydipsia, heat/cold intolerance.  Hematologic/Lymph: Denies lymphadenopathy, easy bruising or easy bleeding.  Allergic/Immunologic: Denies rash, rhinitis.   Skin: back incision with wound vac in place. C/d/i      Vitals:   Temp: 97.7 °F (36.5 °C)  Pulse: 78  Rhythm: normal sinus rhythm  BP: (!) 146/70  MAP (mmHg): 100  Resp: 17  SpO2: 100 %  Oxygen Concentration (%): 6  O2 Device (Oxygen Therapy): nasal cannula    Temp  Min: 97.2 °F (36.2 °C)  Max: 98.1 °F (36.7 °C)  Pulse  Min: 58  Max: 81  BP  Min: 103/58  Max: 154/78  MAP (mmHg)  Min: 73  Max: 109  Resp  Min: 14  Max: 22  SpO2  Min: 93 %  Max: 100 %  Oxygen Concentration (%)  Min: 2  Max: 6    12/07 0701 - 12/08 0700  In: 50   Out: 1100 [Urine:1100]   Unmeasured Output  Urine Occurrence: 1  Stool Occurrence: 1  Emesis Occurrence: 0     Examination:   Constitutional:  Morbidly obese Well-nourished and -developed. No apparent distress.   Eyes: Conjunctiva clear, anicteric. Lids no lesions.  Head/Ears/Nose/Mouth/Throat/Neck: Moist mucous membranes. External ears, nose atraumatic.   Cardiovascular: Regular rhythm. No murmurs. No leg edema.  Respiratory: Comfortable respirations. Clear to auscultation.  Gastrointestinal: No hernia. Soft, nondistended, nontender. + bowel sounds.  Skin: thoracic back incision wound vac in place dreessing c/d/i    Neurologic:   -[id number]  -Alert. Oriented to person, place, and time. Speech fluent. Follows commands.   -Cranial nerves II-XII intact PERRL 3+ eomi. + cough, gag  -Strength generalized weakness post op  3/5 and symmetric in arms, legs. Tone normal.  "  -Sensation bilat intact to touch in arms, legs.    Today I independently reviewed pertinent medications, lines/drains/airways, imaging, laboratory results, microbiology results, notably:   Assessment/Plan:     Neuro  Thoracic spine fracture  See above    Dementia without behavioral disturbance  Continue donepezil 10mg qhs  memantine 10mg bid    Cardiac/Vascular  HTN (hypertension)  SBP goal 100-160  Metoprolol 50mg xl   prn labetalol    ID  * Osteomyelitis of thoracic vertebra   71y/o F with hx of thoracic spine fracture; 10/8 with severe back pain. MRI revealed T9-10 osteodiscitis and T8-10 epidural phlegmon with associated paraverterbral abscesses. Spine infection likely hematogenous from under-treated bacteremia.    Admit to [facility]  S/P T9-10 corpectomy; T7-12 posterior fusion POD#0 with 2 hemovac drains to full suction  Wound vac intact to incision for 5 days  NSGY following  ID following  TLSO brace in place when up  -160, MAP > 65  Tylenol 1g q8h  Ancef 2g q6h post op  ceftaroline 600mg q8h  methacarbmol 500mg qid  mupiricin oint. To nares bid  Pain regimen oxycodone 5mg q6h prn moderate pain 10mg for severe pain  Morphine 2mg q1h severe pain not relieved by oral meds  PT/OT/SLP when appropriate        Discitis of thoracic region  POA   see osteomyelitis thoracic vertebra  ID following  On ABX    MRSA bacteremia  POA  ID following   was on IV vancomycin for 6-8 weeks per ID ( estimated end date 12/3)  Continue ceftaroline 600mg q8h  Bone/tissue sent from OR for pathology, gram stain, aerobic/anaerobic, AFB and fungal cultures.  anticipate 6 weeks ABX post op     Endocrine  Severe obesity (BMI >= 40)  Estimated body mass index is 39.67 kg/m² as calculated from the following:    Height as of this encounter: 5' 10" (1.778 m).    Weight as of this encounter: 125.4 kg (276 lb 7.3 oz).    Other  Debility  PT/OT following    Obstructive sleep apnea treated with continuous positive airway pressure (CPAP)  CPAP " prn          The patient is being Prophylaxed for:  Venous Thromboembolism with: Mechanical  Stress Ulcer with: Not Applicable   Ventilator Pneumonia with: not applicable    Activity Orders          Diet clear liquid: Clear Liquid starting at 12/08 1330    Straight Cath starting at 12/04 1230        Full Code     I have spent 35 min with this patient, with over 50% of this time spent coordinating care and speaking with the family    Cintia Early NP  Neurocritical Care  [facility]

## 2020-12-08 NOTE — ASSESSMENT & PLAN NOTE
POA  ID following   was on IV vancomycin for 6-8 weeks per ID ( estimated end date 12/3)  Continue ceftaroline 600mg q8h  Bone/tissue sent from OR for pathology, gram stain, aerobic/anaerobic, AFB and fungal cultures.  anticipate 6 weeks ABX post op

## 2020-12-08 NOTE — PROGRESS NOTES
Spoke with Dr. Gongora about bed placement via telephone. MD is OK for patient to tx to SICU tonight if no [facility] beds available.

## 2020-12-08 NOTE — ANESTHESIA PROCEDURE NOTES
Arterial    Diagnosis: thoracic DDD    Patient location during procedure: done in OR  Procedure start time: 12/8/2020 7:20 AM  Timeout: 12/8/2020 7:20 AM  Procedure end time: 12/8/2020 7:23 AM    Staffing  Authorizing Provider: Jeancarlos Borges MD  Performing Provider: Jeancarlos Borges MD    Anesthesiologist was present at the time of the procedure.    Preanesthetic Checklist  Completed: patient identified, site marked, surgical consent, pre-op evaluation, timeout performed, IV checked, risks and benefits discussed, monitors and equipment checked and anesthesia consent givenArterial  Skin Prep: chlorhexidine gluconate  Local Infiltration: none  Orientation: left  Location: radial  Catheter Size: 18 G  Catheter placement by Ultrasound guidance. Heme positive aspiration all ports.  Vessel Caliber: small, patent  Needle advanced into vessel with real time Ultrasound guidance.  Sterile sheath used.Insertion Attempts: 1  Assessment  Dressing: secured with tape and tegaderm  Patient: Tolerated well

## 2020-12-08 NOTE — HOSPITAL COURSE
12/8/2020 admit to [facility]. S/P T9-10 corpectomy; T7-12 posterior fusion POD#0, 2 hemovac drains to full suction and wound vac to incision for 5 days. Thoracic spine AP xray post-op, pending.  12/9: UMU. AFVSS. Patient 1 day S/P T9-10 corpectomy; T7-12 posterior fusion. Pain well controlled. Patient A&Ox3 and Neuro exam stable. Patient reports BM yesterday. Sodium level 131 in the AM.  12/10/2020: stepdown to NSGY, d/c Lopressor, continue abx for MRSA bacteremia, add regular diet, repeat CBC stable, replace lytes PRN  .date

## 2020-12-08 NOTE — ANESTHESIA PROCEDURE NOTES
Intubation  Performed by: China Arce CRNA  Authorized by: Jeancarlos Borges MD     Intubation:     Induction:  Intravenous    Intubated:  Postinduction    Mask Ventilation:  Easy with oral airway    Attempts:  1    Attempted By:  Student (BLAYNE CONDE)    Method of Intubation:  Video laryngoscopy    Blade:  Florez 3    Laryngeal View Grade: Grade I - full view of chords      Difficult Airway Encountered?: No      Complications:  None    Airway Device:  Oral endotracheal tube    Airway Device Size:  7.5    Style/Cuff Inflation:  Cuffed    Tube secured:  22    Secured at:  The lips    Placement Verified By:  Capnometry    Complicating Factors:  None    Findings Post-Intubation:  BS equal bilateral

## 2020-12-08 NOTE — ASSESSMENT & PLAN NOTE
71y/o F with hx of thoracic spine fracture; 10/8 with severe back pain. MRI revealed T9-10 osteodiscitis and T8-10 epidural phlegmon with associated paraverterbral abscesses. Spine infection likely hematogenous from under-treated bacteremia.    Admit to [facility]  S/P T9-10 corpectomy; T7-12 posterior fusion POD#0 with 2 hemovac drains to full suction  Wound vac intact to incision for 5 days  NSGY following  ID following  TLSO brace in place when up  -160, MAP > 65  Tylenol 1g q8h  Ancef 2g q6h post op  ceftaroline 600mg q8h  methacarbmol 500mg qid  mupiricin oint. To nares bid  Pain regimen oxycodone 5mg q6h prn moderate pain 10mg for severe pain  Morphine 2mg q1h severe pain not relieved by oral meds  PT/OT/SLP when appropriate

## 2020-12-08 NOTE — PLAN OF CARE
Unable to complete pre-op in its entirety due to roll time.  Left message with pts daughter, signed up for text updates.  Flushed PICC, obtained vitals, verified consents and COVID - status, checked wristbands.  Patient awake alert and oriented X3 (disoriented to time).

## 2020-12-08 NOTE — HPI
70 y.o. female known to NSGY with PMH of Alzheimer's dz, HTN, HLD, CAD, YOVANI, and morbid obesity who was recently hospitalized x2 for MRSA bacteremia complicated by pneumonia and possible UTI, treated with linozolid x8 days and cipro and discharged 10/1. She was then [place] [facility] [place] severe back pain. MRI revealed T9-10 osteodiscitis and T8-10 epidural phlegmon with associated paraverterbral abscesses. Spine infection likely hematogenous from under-treated bacteremia. Patient had no neurologic deficits on exam, no indications for emergent neurosurgical intervention, plan was made for conservative non-surgical treatment. She underwent needle aspiration of T9-10 disc space on 10/16, cultures were negative although she had already been undergoing antibiotic treatment. Repeat BCx remained no growth and she remained afebrile and neurologically stable. Patient was discharged to [facility] on 10/20 with plan for Vancomycin IV x 8 weeks (estimated end date 12/3).       Patient presents 12/1 after interval imaging obtained 11/30 revealed worsening bony destruction, unstable T9 fracture. [place] [facility] [place]        12/8/2020 will admit to [facility] S/P  T9-10 corpectomy; T7-12 posterior fusion POD#0. Wound vac to incision for 5days and hemovac drains x2 to full suction.

## 2020-12-08 NOTE — ASSESSMENT & PLAN NOTE
"Estimated body mass index is 39.67 kg/m² as calculated from the following:    Height as of this encounter: 5' 10" (1.778 m).    Weight as of this encounter: 125.4 kg (276 lb 7.3 oz).  "

## 2020-12-08 NOTE — SUBJECTIVE & OBJECTIVE
Interval History: No AEON.  WBC WNL.  BCXS NGTD.  Surgery planned for this afternoon. The patient denies any recent fever, chills, or sweats. Back pain unchanged/stable.      Review of Systems   Constitutional: Negative for activity change, chills, diaphoresis and fever.   Respiratory: Negative for cough, shortness of breath and wheezing.    Cardiovascular: Negative for chest pain.   Gastrointestinal: Negative for abdominal pain, constipation, diarrhea, nausea and vomiting.   Genitourinary: Negative for dysuria, frequency and urgency.   Musculoskeletal: Positive for arthralgias and back pain.   Neurological: Negative for dizziness.   Hematological: Does not bruise/bleed easily.     Objective:     Vital Signs (Most Recent):  Temp: 97.2 °F (36.2 °C) (12/08/20 1314)  Pulse: 63 (12/08/20 1345)  Resp: 20 (12/08/20 1345)  BP: 130/76 (12/08/20 1345)  SpO2: 98 % (12/08/20 1345) Vital Signs (24h Range):  Temp:  [97.2 °F (36.2 °C)-98.1 °F (36.7 °C)] 97.2 °F (36.2 °C)  Pulse:  [58-72] 63  Resp:  [14-22] 20  SpO2:  [96 %-100 %] 98 %  BP: (103-138)/(58-77) 130/76     Weight: 125.4 kg (276 lb 7.3 oz)  Body mass index is 39.67 kg/m².    Estimated Creatinine Clearance: 73.3 mL/min (based on SCr of 1 mg/dL).    Physical Exam  Constitutional:       General: She is not in acute distress.     Appearance: She is well-developed. She is not diaphoretic.   HENT:      Head: Normocephalic and atraumatic.   Cardiovascular:      Rate and Rhythm: Normal rate and regular rhythm.      Heart sounds: Normal heart sounds. No murmur. No friction rub. No gallop.    Pulmonary:      Effort: Pulmonary effort is normal. No respiratory distress.      Breath sounds: Normal breath sounds. No wheezing or rales.   Abdominal:      General: There is no distension.      Palpations: Abdomen is soft.      Tenderness: There is no abdominal tenderness.   Genitourinary:     Comments: purewick  Skin:     General: Skin is warm and dry.      Comments: PICC c/d/i    Neurological:      Mental Status: She is alert and oriented to person, place, and time.   Psychiatric:         Behavior: Behavior normal.         Significant Labs:   Blood Culture:   Recent Labs   Lab 09/23/20  1433 09/26/20  1452 10/08/20  2344 10/08/20  2345 12/02/20  0020   LABBLOO Gram stain dale bottle: Gram positive cocci in clusters resembling Staph   Results called to and read back by:Parag Galvan RN 09/24/2020  11:30  Gram stain aer bottle: Gram positive cocci in clusters resembling Staph   Positive results previously called 09/24/2020  13:51  METHICILLIN RESISTANT STAPHYLOCOCCUS AUREUS  ID consult required at [facility],[place] and [facility] locations.  For susceptibility see order #[id number]  * No growth after 5 days. No growth after 5 days. No growth after 5 days. No growth after 5 days.     CBC:   No results for input(s): WBC, HGB, HCT, PLT in the last 48 hours.  CMP:   No results for input(s): NA, K, CL, CO2, GLU, BUN, CREATININE, CALCIUM, PROT, ALBUMIN, BILITOT, ALKPHOS, AST, ALT, ANIONGAP, EGFRNONAA in the last 48 hours.    Invalid input(s): ESTGFAFRICA  Wound Culture:   Recent Labs   Lab 10/16/20  1228   LABAERO No growth     All pertinent labs within the past 24 hours have been reviewed.    Significant Imaging: I have reviewed all pertinent imaging results/findings within the past 24 hours.   CT Cervical Spine Without Contrast [[id number]] Resulted: 12/04/20 0626   Order Status: Completed Updated: 12/04/20 0628   Narrative:     EXAMINATION:   CT CERVICAL SPINE WITHOUT CONTRAST     CLINICAL HISTORY:   evaluate for fracture; prevertebral edema at C5-6 on MRI;     TECHNIQUE:   Low dose axial images, sagittal and coronal reformations were performed though the cervical spine.  Contrast was not administered.     COMPARISON:   MRI cervical, thoracic and lumbar spine 12/02/2020, cervical spine MRI 06/01/2015     FINDINGS:   Cervical vertebral body alignment appears to be within normal limits.   Vertebral body heights appear maintained and similar to prior MRI of 2015.  No definite CT evidence to suggest acute cervical spine fracture. The facet joints articulate appropriately. No significant prevertebral soft tissue swelling noting the cervical esophagus is slightly thickened. There is multilevel intervertebral disc height loss and extensive multilevel degenerative change of the visualized cervical spine primarily consisting of multiple posterior disc osteophyte complexes, uncovertebral joint DJD and facet arthropathy.  C4-C5 and C5-C6 levels where there are varying degrees of moderate/severe neural foraminal narrowing bilaterally.  The visualized skull base is intact.  The visualized lung apices demonstrate bilateral pleural fluid.    Impression:       1. No CT evidence to suggest acute cervical spine fracture.  Clinical correlation and further evaluation as warranted.   2. Multilevel degenerative change of the cervical spine most pronounced at the C4-C5 and C5-C6 levels.       Electronically signed by: Christy Mcgee MD   Date: 12/04/2020   Time: 06:26   MRI SPINE CERVICAL-THORACIC-LUMBAR W W/O CONTRAST (XPD) [[id number]] (Abnormal) Resulted: 12/02/20 0626   Order Status: Completed Updated: 12/02/20 0628   Narrative:     EXAMINATION:   MRI SPINE CERVICAL-THORACIC-LUMBAR W W/O CONTRAST (XPD)     CLINICAL HISTORY:   worsening osteo;     TECHNIQUE:   Multiplanar, multisequence MR images of the cervical, thoracic and lumbar spine were performed before and after the administration of 10 cc gadolinium based IV contrast.     COMPARISON:   *CT thoracic spine 11/30/2020 10/13/2020   *MRI thoracic spine 11/09/2020, 10/12/2020   *CT lumbar spine 10/13/2020   *MRI lumbar spine 10/12/2020     FINDINGS:   Cervical spine:     Please note image quality is degraded by patient motion artifact, most notably sagittal postcontrast images.  Cervical vertebral bodies demonstrate no evidence to suggest acute fracture or abnormal  infiltrating marrow replacement process.  There is multilevel intervertebral disc desiccation and disc height loss most pronounced at the C5-C6 level.  The craniocervical junction is within normal limits.  The cervical spinal cord is normal in caliber and signal intensity.  There is multilevel degenerative change of the cervical spine consisting of uncovertebral joint DJD, posterior disc osteophyte complexes and facet arthropathy.  Degenerative change most pronounced at the C5-C6 level where there is effacement of the anterior thecal sac and moderate/severe bilateral neural foraminal narrowing (right greater than left).  Following the administration of IV contrast, no pathologic foci of enhancement are appreciated.  Incidentally visualized soft tissue structures demonstrate a presumed sebaceous cyst in the right posterior extra thoracic soft tissues.     Thoracic:     There is redemonstration of abnormal marrow signal intensity and postcontrast enhancement involving the T9-T10 level consistent with patient's known osteomyelitis/discitis.  There is abnormal fluid signal and peripheral enhancement involving the T9-T10 intervertebral disc space concerning for associated disc space abscess.  There is abnormal signal intensity and postcontrast enhancement involving the anterior and posterior epidural spaces from the T8 through T10 level in keeping with associated epidural phlegmon.  There is associated mass effect on the thoracic spinal cord without definite abnormal intramedullary cord signal intensity.  Phlegmonous change appears to involve the T8-T9 and T9-T10 neural foramina.  There is associated paravertebral phlegmonous change also at these levels.  There is subtle T2/STIR signal hyperintensity involving the T3-T4 intervertebral disc space noting this appears increased in conspicuity from prior examination.  This could reflect an additional region of discitis/osteomyelitis and attention on follow-up exam is  advised.  Thoracic vertebral body alignment is stable and there is redemonstration of multilevel degenerative change.  Incidentally visualized intrathoracic structures demonstrate a moderate-sized hiatal hernia and left pleural fluid.     Lumbar spine:     There is grade 2 anterolisthesis of L5 on S1 secondary to bilateral L5 pars defects, unchanged from prior examination.  There is grade 1 anterolisthesis of L3 on L4, also unchanged.  Lumbar vertebral bodies demonstrate no evidence of acute fracture or infiltrating marrow replacement process.  There is multilevel intervertebral disc desiccation.  The conus medullaris is normal in morphology and terminates at the T12-L1 level.  Following the administration of IV contrast, no pathologic foci of enhancement are appreciated.  There is multilevel degenerative change of the lumbar spine similar to most recent MRI of 10/12/2020.  There is a subcentimeter right renal cortical T2 hyperintensity, likely a cyst.  There is fatty atrophy of the paraspinal musculature.    Impression:       1. Findings in keeping with known T9-T10 and osteomyelitis/discitis with associated intervertebral disc space abscess, epidural phlegmon with associated mass effect on the thoracic spinal cord and paravertebral phlegmon as discussed above.  Findings do not appear significantly improved when compared to most recent exam of 11/09/2020.   2. Subtle T2/STIR signal hyperintensity involving the T3-T4 intervertebral disc space, increased in conspicuity from prior examination. This could reflect an additional region of discitis/osteomyelitis and attention on follow-up exam is advised.   3. No evidence to suggest osteomyelitis/discitis in the cervical or lumbar spine at this time.  Multilevel degenerative change of the cervical and lumbar spine.   4. Moderate size hiatal hernia and left pleural effusion.   This report was flagged in Epic as abnormal.     Electronically signed by resident: Leif Glover    Date: 12/02/2020   Time: 04:58     Electronically signed by: Christy Mcgee MD   Date: 12/02/2020   Time: 06:26   Imaging History    2020  Date Procedure Name Status Accession Number Location   12/03/20 03:16 PM CT Cervical Spine Without Contrast Final [id number] [facility]   12/02/20 04:03 AM MRI SPINE CERVICAL-THORACIC-LUMBAR W W/O CONTRAST (XPD) Final [id number] [facility]   11/30/20 02:10 PM CT Thoracic Spine Without Contrast Final [id number] [facility]   11/09/20 12:08 PM MRI Thoracic Spine W WO Cont Final [id number] [facility]

## 2020-12-09 PROBLEM — E87.1 HYPONATREMIA: Status: ACTIVE | Noted: 2020-12-09

## 2020-12-09 LAB
ALBUMIN SERPL BCP-MCNC: 2.2 G/DL (ref 3.5–5.2)
ALP SERPL-CCNC: 99 U/L (ref 55–135)
ALT SERPL W/O P-5'-P-CCNC: 7 U/L (ref 10–44)
ANION GAP SERPL CALC-SCNC: 4 MMOL/L (ref 8–16)
ANION GAP SERPL CALC-SCNC: 5 MMOL/L (ref 8–16)
AST SERPL-CCNC: 14 U/L (ref 10–40)
BASOPHILS # BLD AUTO: 0.01 K/UL (ref 0–0.2)
BASOPHILS NFR BLD: 0.1 % (ref 0–1.9)
BILIRUB SERPL-MCNC: 0.2 MG/DL (ref 0.1–1)
BUN SERPL-MCNC: 10 MG/DL (ref 8–23)
BUN SERPL-MCNC: 9 MG/DL (ref 8–23)
CALCIUM SERPL-MCNC: 9.4 MG/DL (ref 8.7–10.5)
CALCIUM SERPL-MCNC: 9.5 MG/DL (ref 8.7–10.5)
CHLORIDE SERPL-SCNC: 102 MMOL/L (ref 95–110)
CHLORIDE SERPL-SCNC: 105 MMOL/L (ref 95–110)
CO2 SERPL-SCNC: 25 MMOL/L (ref 23–29)
CO2 SERPL-SCNC: 26 MMOL/L (ref 23–29)
CREAT SERPL-MCNC: 0.8 MG/DL (ref 0.5–1.4)
CREAT SERPL-MCNC: 0.9 MG/DL (ref 0.5–1.4)
DIFFERENTIAL METHOD: ABNORMAL
EOSINOPHIL # BLD AUTO: 0 K/UL (ref 0–0.5)
EOSINOPHIL NFR BLD: 0 % (ref 0–8)
ERYTHROCYTE [DISTWIDTH] IN BLOOD BY AUTOMATED COUNT: 16.5 % (ref 11.5–14.5)
EST. GFR  (AFRICAN AMERICAN): >60 ML/MIN/1.73 M^2
EST. GFR  (AFRICAN AMERICAN): >60 ML/MIN/1.73 M^2
EST. GFR  (NON AFRICAN AMERICAN): >60 ML/MIN/1.73 M^2
EST. GFR  (NON AFRICAN AMERICAN): >60 ML/MIN/1.73 M^2
GLUCOSE SERPL-MCNC: 119 MG/DL (ref 70–110)
GLUCOSE SERPL-MCNC: 92 MG/DL (ref 70–110)
HCT VFR BLD AUTO: 29 % (ref 37–48.5)
HGB BLD-MCNC: 8.9 G/DL (ref 12–16)
IMM GRANULOCYTES # BLD AUTO: 0.06 K/UL (ref 0–0.04)
IMM GRANULOCYTES NFR BLD AUTO: 0.5 % (ref 0–0.5)
LYMPHOCYTES # BLD AUTO: 1.2 K/UL (ref 1–4.8)
LYMPHOCYTES NFR BLD: 10.3 % (ref 18–48)
MAGNESIUM SERPL-MCNC: 1.7 MG/DL (ref 1.6–2.6)
MCH RBC QN AUTO: 28.3 PG (ref 27–31)
MCHC RBC AUTO-ENTMCNC: 30.7 G/DL (ref 32–36)
MCV RBC AUTO: 92 FL (ref 82–98)
MONOCYTES # BLD AUTO: 1.1 K/UL (ref 0.3–1)
MONOCYTES NFR BLD: 9.7 % (ref 4–15)
NEUTROPHILS # BLD AUTO: 9 K/UL (ref 1.8–7.7)
NEUTROPHILS NFR BLD: 79.4 % (ref 38–73)
NRBC BLD-RTO: 0 /100 WBC
PHOSPHATE SERPL-MCNC: 3.2 MG/DL (ref 2.7–4.5)
PLATELET # BLD AUTO: 258 K/UL (ref 150–350)
PMV BLD AUTO: 10.1 FL (ref 9.2–12.9)
POTASSIUM SERPL-SCNC: 3.9 MMOL/L (ref 3.5–5.1)
POTASSIUM SERPL-SCNC: 4.1 MMOL/L (ref 3.5–5.1)
PROT SERPL-MCNC: 5.5 G/DL (ref 6–8.4)
RBC # BLD AUTO: 3.15 M/UL (ref 4–5.4)
SODIUM SERPL-SCNC: 131 MMOL/L (ref 136–145)
SODIUM SERPL-SCNC: 136 MMOL/L (ref 136–145)
WBC # BLD AUTO: 11.37 K/UL (ref 3.9–12.7)

## 2020-12-09 PROCEDURE — 80053 COMPREHEN METABOLIC PANEL: CPT

## 2020-12-09 PROCEDURE — 97110 THERAPEUTIC EXERCISES: CPT

## 2020-12-09 PROCEDURE — 63600175 PHARM REV CODE 636 W HCPCS: Performed by: PHYSICIAN ASSISTANT

## 2020-12-09 PROCEDURE — 63600175 PHARM REV CODE 636 W HCPCS: Mod: JG | Performed by: PHYSICIAN ASSISTANT

## 2020-12-09 PROCEDURE — 99233 PR SUBSEQUENT HOSPITAL CARE,LEVL III: ICD-10-PCS | Mod: ,,, | Performed by: PHYSICIAN ASSISTANT

## 2020-12-09 PROCEDURE — 97165 OT EVAL LOW COMPLEX 30 MIN: CPT

## 2020-12-09 PROCEDURE — 63600175 PHARM REV CODE 636 W HCPCS: Performed by: NURSE PRACTITIONER

## 2020-12-09 PROCEDURE — 25000003 PHARM REV CODE 250: Performed by: STUDENT IN AN ORGANIZED HEALTH CARE EDUCATION/TRAINING PROGRAM

## 2020-12-09 PROCEDURE — 97530 THERAPEUTIC ACTIVITIES: CPT

## 2020-12-09 PROCEDURE — 99900035 HC TECH TIME PER 15 MIN (STAT)

## 2020-12-09 PROCEDURE — 80048 BASIC METABOLIC PNL TOTAL CA: CPT

## 2020-12-09 PROCEDURE — 63600175 PHARM REV CODE 636 W HCPCS: Performed by: STUDENT IN AN ORGANIZED HEALTH CARE EDUCATION/TRAINING PROGRAM

## 2020-12-09 PROCEDURE — 20000000 HC ICU ROOM

## 2020-12-09 PROCEDURE — 25000003 PHARM REV CODE 250: Performed by: NURSE PRACTITIONER

## 2020-12-09 PROCEDURE — 97112 NEUROMUSCULAR REEDUCATION: CPT

## 2020-12-09 PROCEDURE — 25000003 PHARM REV CODE 250: Performed by: PHYSICIAN ASSISTANT

## 2020-12-09 PROCEDURE — 84100 ASSAY OF PHOSPHORUS: CPT

## 2020-12-09 PROCEDURE — 97535 SELF CARE MNGMENT TRAINING: CPT

## 2020-12-09 PROCEDURE — 94761 N-INVAS EAR/PLS OXIMETRY MLT: CPT

## 2020-12-09 PROCEDURE — 97162 PT EVAL MOD COMPLEX 30 MIN: CPT

## 2020-12-09 PROCEDURE — 63600175 PHARM REV CODE 636 W HCPCS: Performed by: NEUROLOGICAL SURGERY

## 2020-12-09 PROCEDURE — 85025 COMPLETE CBC W/AUTO DIFF WBC: CPT

## 2020-12-09 PROCEDURE — 99233 SBSQ HOSP IP/OBS HIGH 50: CPT | Mod: ,,, | Performed by: PHYSICIAN ASSISTANT

## 2020-12-09 PROCEDURE — 27000221 HC OXYGEN, UP TO 24 HOURS

## 2020-12-09 PROCEDURE — 83735 ASSAY OF MAGNESIUM: CPT

## 2020-12-09 PROCEDURE — 25000003 PHARM REV CODE 250: Performed by: NEUROLOGICAL SURGERY

## 2020-12-09 RX ORDER — METOPROLOL TARTRATE 25 MG/1
25 TABLET, FILM COATED ORAL 2 TIMES DAILY
Status: DISCONTINUED | OUTPATIENT
Start: 2020-12-09 | End: 2020-12-10

## 2020-12-09 RX ORDER — MAGNESIUM SULFATE HEPTAHYDRATE 40 MG/ML
2 INJECTION, SOLUTION INTRAVENOUS ONCE
Status: COMPLETED | OUTPATIENT
Start: 2020-12-09 | End: 2020-12-09

## 2020-12-09 RX ORDER — VANCOMYCIN HCL IN 5 % DEXTROSE 1G/250ML
1000 PLASTIC BAG, INJECTION (ML) INTRAVENOUS
Status: DISCONTINUED | OUTPATIENT
Start: 2020-12-10 | End: 2020-12-09

## 2020-12-09 RX ORDER — METOPROLOL TARTRATE 25 MG/1
25 TABLET, FILM COATED ORAL 3 TIMES DAILY
Status: DISCONTINUED | OUTPATIENT
Start: 2020-12-09 | End: 2020-12-09

## 2020-12-09 RX ORDER — HEPARIN SODIUM 5000 [USP'U]/ML
5000 INJECTION, SOLUTION INTRAVENOUS; SUBCUTANEOUS EVERY 8 HOURS
Status: DISCONTINUED | OUTPATIENT
Start: 2020-12-09 | End: 2020-12-18 | Stop reason: HOSPADM

## 2020-12-09 RX ADMIN — ACETAMINOPHEN 1000 MG: 500 TABLET ORAL at 01:12

## 2020-12-09 RX ADMIN — SODIUM CHLORIDE 500 ML: 0.9 INJECTION, SOLUTION INTRAVENOUS at 08:12

## 2020-12-09 RX ADMIN — PRAVASTATIN SODIUM 40 MG: 40 TABLET ORAL at 09:12

## 2020-12-09 RX ADMIN — MUPIROCIN: 20 OINTMENT TOPICAL at 09:12

## 2020-12-09 RX ADMIN — SODIUM CHLORIDE 50 ML/HR: 0.9 INJECTION, SOLUTION INTRAVENOUS at 05:12

## 2020-12-09 RX ADMIN — PANTOPRAZOLE SODIUM 40 MG: 40 TABLET, DELAYED RELEASE ORAL at 05:12

## 2020-12-09 RX ADMIN — MEMANTINE HYDROCHLORIDE 10 MG: 10 TABLET ORAL at 08:12

## 2020-12-09 RX ADMIN — HEPARIN SODIUM 5000 UNITS: 5000 INJECTION INTRAVENOUS; SUBCUTANEOUS at 09:12

## 2020-12-09 RX ADMIN — VANCOMYCIN HYDROCHLORIDE 2000 MG: 10 INJECTION, POWDER, LYOPHILIZED, FOR SOLUTION INTRAVENOUS at 04:12

## 2020-12-09 RX ADMIN — DONEPEZIL HYDROCHLORIDE 10 MG: 10 TABLET ORAL at 09:12

## 2020-12-09 RX ADMIN — MAGNESIUM SULFATE IN WATER 2 G: 40 INJECTION, SOLUTION INTRAVENOUS at 05:12

## 2020-12-09 RX ADMIN — MORPHINE SULFATE 2 MG: 2 INJECTION, SOLUTION INTRAMUSCULAR; INTRAVENOUS at 08:12

## 2020-12-09 RX ADMIN — CEFTAROLINE FOSAMIL 600 MG: 600 POWDER, FOR SOLUTION INTRAVENOUS at 09:12

## 2020-12-09 RX ADMIN — BISACODYL 10 MG: 10 SUPPOSITORY RECTAL at 08:12

## 2020-12-09 RX ADMIN — ACETAMINOPHEN 500 MG: 500 TABLET ORAL at 05:12

## 2020-12-09 RX ADMIN — METHOCARBAMOL 500 MG: 500 TABLET ORAL at 09:12

## 2020-12-09 RX ADMIN — METOPROLOL SUCCINATE 50 MG: 50 TABLET, EXTENDED RELEASE ORAL at 08:12

## 2020-12-09 RX ADMIN — MEMANTINE HYDROCHLORIDE 10 MG: 10 TABLET ORAL at 09:12

## 2020-12-09 RX ADMIN — METHOCARBAMOL 500 MG: 500 TABLET ORAL at 08:12

## 2020-12-09 RX ADMIN — SENNOSIDES AND DOCUSATE SODIUM 1 TABLET: 8.6; 5 TABLET ORAL at 09:12

## 2020-12-09 RX ADMIN — OXYCODONE HYDROCHLORIDE 5 MG: 5 TABLET ORAL at 08:12

## 2020-12-09 RX ADMIN — CEFTAROLINE FOSAMIL 600 MG: 600 POWDER, FOR SOLUTION INTRAVENOUS at 02:12

## 2020-12-09 RX ADMIN — ACETAMINOPHEN 1000 MG: 500 TABLET ORAL at 09:12

## 2020-12-09 RX ADMIN — OXYCODONE HYDROCHLORIDE 10 MG: 10 TABLET ORAL at 01:12

## 2020-12-09 RX ADMIN — CEFTAROLINE FOSAMIL 600 MG: 600 POWDER, FOR SOLUTION INTRAVENOUS at 01:12

## 2020-12-09 RX ADMIN — DULOXETINE HYDROCHLORIDE 60 MG: 60 CAPSULE, DELAYED RELEASE ORAL at 08:12

## 2020-12-09 RX ADMIN — METHOCARBAMOL 500 MG: 500 TABLET ORAL at 05:12

## 2020-12-09 RX ADMIN — MORPHINE SULFATE 2 MG: 2 INJECTION, SOLUTION INTRAMUSCULAR; INTRAVENOUS at 03:12

## 2020-12-09 NOTE — PLAN OF CARE
[facility] Care Plan    POC reviewed with Martina Betancourt. Pt verbalized understanding. Questions and concerns addressed. Pt arrived from PACU this morning post corpectomy and spinal fusion. TLSO in place. Mag in place. Na+ 131, per Sheard, NP limit free water intake. NS @ 50mL/hr. Oxy 10mg given x1 and Morphine 2mg given x1 for pain management. SBP maintained <180 without pharmacologic intervention. Pt on 2L NC. Will continue to monitor. See below and flowsheets for full assessment and VS info.       Neuro:  [place] Coma Scale  Best Eye Response: 4-->(E4) spontaneous  Best Motor Response: 6-->(M6) obeys commands  Best Verbal Response: 5-->(V5) oriented  [place] Coma Scale Score: 15  Assessment Qualifiers: patient not sedated/intubated  Pupil PERRLA: yes     24hr Temp:  [97.2 °F (36.2 °C)-98.2 °F (36.8 °C)]     CV:   Rhythm: normal sinus rhythm  BP goals:   SBP < 180  MAP > 65    Resp:   O2 Device (Oxygen Therapy): nasal cannula  Oxygen Concentration (%): 24    Plan: N/A    GI/:     Diet/Nutrition Received: clear liquid  Last Bowel Movement: (P) 12/07/20  Voiding Characteristics: urethral catheter (bladder)    Intake/Output Summary (Last 24 hours) at 12/9/2020 0536  Last data filed at 12/9/2020 0505  Gross per 24 hour   Intake 2645.83 ml   Output 2103 ml   Net 542.83 ml     Unmeasured Output  Urine Occurrence: 1  Stool Occurrence: 1  Emesis Occurrence: 0    Labs/Accuchecks:  Recent Labs   Lab 12/09/20  0406   WBC 11.37   RBC 3.15*   HGB 8.9*   HCT 29.0*         Recent Labs   Lab 12/09/20  0406   *   K 3.9   CO2 25      BUN 10   CREATININE 0.9   ALKPHOS 99   ALT 7*   AST 14   BILITOT 0.2      Recent Labs   Lab 12/07/20  0834   INR 1.0   APTT 28.4    No results for input(s): CPK, CPKMB, TROPONINI, MB in the last 168 hours.    Electrolytes: Electrolytes replaced  Accuchecks: none    Gtts:   sodium chloride 0.9% 50 mL/hr at 12/09/20 0505       LDA/Wounds:  Lines/Drains/Airways       Peripherally Inserted  Central Catheter Line              PICC Double Lumen 10/19/20 1114 right brachial 50 days              Drain                   Closed/Suction Drain 12/08/20 1239 Left Back Accordion 10 Fr. less than 1 day         Closed/Suction Drain 12/08/20 1239 Right Back Accordion 10 Fr. less than 1 day         Urethral Catheter 12/08/20 0830 Non-latex;Straight-tip 16 Fr. less than 1 day              Arterial Line              Arterial Line 12/08/20 0720 Left Radial less than 1 day                  Wounds: Yes  Wound care consulted: Yes

## 2020-12-09 NOTE — PROGRESS NOTES
Patient arrived to [facility] from [facility]>>>OR>>>PACU>>>[facility] [address]005    Type of stroke/diagnosis: corpectomy with fusion    TPA start and end time (if applicable) N/A    Thrombectomy start and end time (if applicable) N/A    Current symptoms: back pain with bilateral LE weakness    Skin assessment done: Y  Wounds noted: bilateral back hemovacs with dressing to spine and pressure injury to R heel  YAYO Armband Applied: No, Passed  Patient Belongings on Admit: pt states she had a cell phone present prior to surgery. No belongings when pt arrived to unit.  Will attempt to find    NCC notified: name of person notified MD Nikhil

## 2020-12-09 NOTE — ASSESSMENT & PLAN NOTE
69y/o F with hx of thoracic spine fracture; 10/8 with severe back pain. MRI revealed T9-10 osteodiscitis and T8-10 epidural phlegmon with associated paraverterbral abscesses. Spine infection likely hematogenous from under-treated bacteremia.    S/P T9-10 corpectomy; T7-12 posterior fusion POD#0 with 2 hemovac drains to full suction  Wound vac intact to incision for 5 days  NSGY following  ID following  TLSO brace in place when up  -160, MAP > 65  Tylenol 1g q8h  ceftaroline 600mg q8h  Start Vanc 2 g per ID  methacarbmol 500mg qid  mupiricin oint. To nares bid  Pain regimen oxycodone 5mg q6h prn moderate pain 10mg for severe pain  Morphine 2mg q1h severe pain not relieved by oral meds  PT/OT/SLP when appropriate

## 2020-12-09 NOTE — SUBJECTIVE & OBJECTIVE
Interval History: Patient seen at bedside. Daughter present in room. Tolerated surgery well. Currently has significant back pain and body aches with some post operative nausea. Afebrile. Surgical cx are NGTD. No other acute complaints. Feels very tired today.       Review of Systems   Constitutional: Positive for fatigue. Negative for activity change, chills, diaphoresis and fever.   HENT: Positive for sore throat.    Respiratory: Negative for cough, shortness of breath and wheezing.    Cardiovascular: Negative for chest pain.   Gastrointestinal: Positive for nausea. Negative for abdominal pain, constipation, diarrhea and vomiting.   Genitourinary: Negative for dysuria, frequency and urgency.   Musculoskeletal: Positive for arthralgias and back pain.   Neurological: Negative for dizziness.   Hematological: Does not bruise/bleed easily.     Objective:     Vital Signs (Most Recent):  Temp: 98.2 °F (36.8 °C) (12/09/20 1100)  Pulse: 61 (12/09/20 1200)  Resp: 14 (12/09/20 1200)  BP: (!) 115/51 (12/09/20 1200)  SpO2: 98 % (12/09/20 1200) Vital Signs (24h Range):  Temp:  [97.6 °F (36.4 °C)-98.3 °F (36.8 °C)] 98.2 °F (36.8 °C)  Pulse:  [] 61  Resp:  [13-25] 14  SpO2:  [92 %-100 %] 98 %  BP: (115-155)/(51-76) 115/51  Arterial Line BP: ()/(36-86) 111/86     Weight: 125.4 kg (276 lb 7.3 oz)  Body mass index is 39.67 kg/m².    Estimated Creatinine Clearance: 91.6 mL/min (based on SCr of 0.8 mg/dL).    Physical Exam  Constitutional:       General: She is not in acute distress.     Appearance: She is well-developed. She is obese. She is ill-appearing. She is not diaphoretic.   HENT:      Head: Normocephalic and atraumatic.      Nose: Nose normal.      Mouth/Throat:      Comments: Dried blood in mouth from intubation  Cardiovascular:      Rate and Rhythm: Normal rate and regular rhythm.      Heart sounds: Normal heart sounds. No murmur. No friction rub. No gallop.    Pulmonary:      Effort: Pulmonary effort is normal.  No respiratory distress.      Breath sounds: Normal breath sounds.      Comments: o2 via NC  Abdominal:      General: There is no distension.      Palpations: Abdomen is soft.      Tenderness: There is no abdominal tenderness.   Genitourinary:     Comments: corrina  Musculoskeletal:      Comments: Surgical drains bilaterally with serosanguinous output   Skin:     General: Skin is warm and dry.      Comments: PICC c/d/i   Neurological:      Mental Status: She is alert and oriented to person, place, and time.   Psychiatric:         Behavior: Behavior normal.         Significant Labs:   Blood Culture:   Recent Labs   Lab 09/23/20  1433 09/26/20  1452 10/08/20  2344 10/08/20  2345 12/02/20  0020   LABBLOO Gram stain dale bottle: Gram positive cocci in clusters resembling Staph   Results called to and read back by:Parag Galvan RN 09/24/2020  11:30  Gram stain aer bottle: Gram positive cocci in clusters resembling Staph   Positive results previously called 09/24/2020  13:51  METHICILLIN RESISTANT STAPHYLOCOCCUS AUREUS  ID consult required at [facility]Critical access hospital[facility].  For susceptibility see order #[id number]  * No growth after 5 days. No growth after 5 days. No growth after 5 days. No growth after 5 days.     CBC:   Recent Labs   Lab 12/08/20  0803 12/08/20  1216 12/09/20  0406   WBC  --   --  11.37   HGB  --   --  8.9*   HCT 33* 30* 29.0*   PLT  --   --  258     CMP:   Recent Labs   Lab 12/09/20  0406 12/09/20  1357   * 136   K 3.9 4.1    105   CO2 25 26   * 92   BUN 10 9   CREATININE 0.9 0.8   CALCIUM 9.4 9.5   PROT 5.5*  --    ALBUMIN 2.2*  --    BILITOT 0.2  --    ALKPHOS 99  --    AST 14  --    ALT 7*  --    ANIONGAP 4* 5*   EGFRNONAA >60.0 >60.0     Wound Culture:   Recent Labs   Lab 10/16/20  1228 12/08/20  1139   LABAERO No growth No growth  No growth     All pertinent labs within the past 24 hours have been reviewed.    Significant Imaging: I have reviewed all pertinent  imaging results/findings within the past 24 hours.   CT Cervical Spine Without Contrast [[id number]] Resulted: 12/04/20 0626   Order Status: Completed Updated: 12/04/20 0628   Narrative:     EXAMINATION:   CT CERVICAL SPINE WITHOUT CONTRAST     CLINICAL HISTORY:   evaluate for fracture; prevertebral edema at C5-6 on MRI;     TECHNIQUE:   Low dose axial images, sagittal and coronal reformations were performed though the cervical spine.  Contrast was not administered.     COMPARISON:   MRI cervical, thoracic and lumbar spine 12/02/2020, cervical spine MRI 06/01/2015     FINDINGS:   Cervical vertebral body alignment appears to be within normal limits.  Vertebral body heights appear maintained and similar to prior MRI of 2015.  No definite CT evidence to suggest acute cervical spine fracture. The facet joints articulate appropriately. No significant prevertebral soft tissue swelling noting the cervical esophagus is slightly thickened. There is multilevel intervertebral disc height loss and extensive multilevel degenerative change of the visualized cervical spine primarily consisting of multiple posterior disc osteophyte complexes, uncovertebral joint DJD and facet arthropathy.  C4-C5 and C5-C6 levels where there are varying degrees of moderate/severe neural foraminal narrowing bilaterally.  The visualized skull base is intact.  The visualized lung apices demonstrate bilateral pleural fluid.    Impression:       1. No CT evidence to suggest acute cervical spine fracture.  Clinical correlation and further evaluation as warranted.   2. Multilevel degenerative change of the cervical spine most pronounced at the C4-C5 and C5-C6 levels.       Electronically signed by: Christy Mcgee MD   Date: 12/04/2020   Time: 06:26   MRI SPINE CERVICAL-THORACIC-LUMBAR W W/O CONTRAST (XPD) [[id number]] (Abnormal) Resulted: 12/02/20 0626   Order Status: Completed Updated: 12/02/20 0628   Narrative:     EXAMINATION:   MRI SPINE  CERVICAL-THORACIC-LUMBAR W W/O CONTRAST (XPD)     CLINICAL HISTORY:   worsening osteo;     TECHNIQUE:   Multiplanar, multisequence MR images of the cervical, thoracic and lumbar spine were performed before and after the administration of 10 cc gadolinium based IV contrast.     COMPARISON:   *CT thoracic spine 11/30/2020 10/13/2020   *MRI thoracic spine 11/09/2020, 10/12/2020   *CT lumbar spine 10/13/2020   *MRI lumbar spine 10/12/2020     FINDINGS:   Cervical spine:     Please note image quality is degraded by patient motion artifact, most notably sagittal postcontrast images.  Cervical vertebral bodies demonstrate no evidence to suggest acute fracture or abnormal infiltrating marrow replacement process.  There is multilevel intervertebral disc desiccation and disc height loss most pronounced at the C5-C6 level.  The craniocervical junction is within normal limits.  The cervical spinal cord is normal in caliber and signal intensity.  There is multilevel degenerative change of the cervical spine consisting of uncovertebral joint DJD, posterior disc osteophyte complexes and facet arthropathy.  Degenerative change most pronounced at the C5-C6 level where there is effacement of the anterior thecal sac and moderate/severe bilateral neural foraminal narrowing (right greater than left).  Following the administration of IV contrast, no pathologic foci of enhancement are appreciated.  Incidentally visualized soft tissue structures demonstrate a presumed sebaceous cyst in the right posterior extra thoracic soft tissues.     Thoracic:     There is redemonstration of abnormal marrow signal intensity and postcontrast enhancement involving the T9-T10 level consistent with patient's known osteomyelitis/discitis.  There is abnormal fluid signal and peripheral enhancement involving the T9-T10 intervertebral disc space concerning for associated disc space abscess.  There is abnormal signal intensity and postcontrast enhancement  involving the anterior and posterior epidural spaces from the T8 through T10 level in keeping with associated epidural phlegmon.  There is associated mass effect on the thoracic spinal cord without definite abnormal intramedullary cord signal intensity.  Phlegmonous change appears to involve the T8-T9 and T9-T10 neural foramina.  There is associated paravertebral phlegmonous change also at these levels.  There is subtle T2/STIR signal hyperintensity involving the T3-T4 intervertebral disc space noting this appears increased in conspicuity from prior examination.  This could reflect an additional region of discitis/osteomyelitis and attention on follow-up exam is advised.  Thoracic vertebral body alignment is stable and there is redemonstration of multilevel degenerative change.  Incidentally visualized intrathoracic structures demonstrate a moderate-sized hiatal hernia and left pleural fluid.     Lumbar spine:     There is grade 2 anterolisthesis of L5 on S1 secondary to bilateral L5 pars defects, unchanged from prior examination.  There is grade 1 anterolisthesis of L3 on L4, also unchanged.  Lumbar vertebral bodies demonstrate no evidence of acute fracture or infiltrating marrow replacement process.  There is multilevel intervertebral disc desiccation.  The conus medullaris is normal in morphology and terminates at the T12-L1 level.  Following the administration of IV contrast, no pathologic foci of enhancement are appreciated.  There is multilevel degenerative change of the lumbar spine similar to most recent MRI of 10/12/2020.  There is a subcentimeter right renal cortical T2 hyperintensity, likely a cyst.  There is fatty atrophy of the paraspinal musculature.    Impression:       1. Findings in keeping with known T9-T10 and osteomyelitis/discitis with associated intervertebral disc space abscess, epidural phlegmon with associated mass effect on the thoracic spinal cord and paravertebral phlegmon as discussed  above.  Findings do not appear significantly improved when compared to most recent exam of 11/09/2020.   2. Subtle T2/STIR signal hyperintensity involving the T3-T4 intervertebral disc space, increased in conspicuity from prior examination. This could reflect an additional region of discitis/osteomyelitis and attention on follow-up exam is advised.   3. No evidence to suggest osteomyelitis/discitis in the cervical or lumbar spine at this time.  Multilevel degenerative change of the cervical and lumbar spine.   4. Moderate size hiatal hernia and left pleural effusion.   This report was flagged in Epic as abnormal.     Electronically signed by resident: Leif Glover   Date: 12/02/2020   Time: 04:58     Electronically signed by: Christy Mcgee MD   Date: 12/02/2020   Time: 06:26   Imaging History    2020  Date Procedure Name Status Accession Number Location   12/03/20 03:16 PM CT Cervical Spine Without Contrast Final [id number] [facility]   12/02/20 04:03 AM MRI SPINE CERVICAL-THORACIC-LUMBAR W W/O CONTRAST (XPD) Final [id number] [facility]   11/30/20 02:10 PM CT Thoracic Spine Without Contrast Final [id number] [facility]   11/09/20 12:08 PM MRI Thoracic Spine W WO Cont Final [id number] [facility]

## 2020-12-09 NOTE — PROGRESS NOTES
[facility]  Infectious Disease  Progress Note    Patient Name: Martina Betancourt  MRN: [id number]  Admission Date: 12/1/2020  Length of Stay: 8 days  Attending Physician: Everardo Gongora MD  Primary Care Provider: Joe Fuller Iii, MD    Isolation Status: No active isolations  Assessment/Plan:      * Osteomyelitis of thoracic vertebra     70 year old female with hx of MRSA bacteremia, known T9-10 osteo discitis, T8-10 epidural phlegmon with associated paraverterbral abscesses (NSGY consulted at that time - did not recommend acute surgical intervention) whom has been on 6-8 weeks of IV Vancomycin now admitted with worsening erosive changes of vertebral bodes, pedicular fractures and persistent intervertebral disc space abscess and epidural phlegmon.     Vancomycin discontinued and Ceftaroline started. Blood cx are negative. She is afebrile. No leukocytosis. Now s/p I&D, T9-T10 corpectomy and fusion 12/8. Cx are pending.    Plan:  1. Continue Ceftaroline  2. Restart Vancomycin  3. Follow surgical cultures. If cultures return negative, can discontinue Ceftaroline and treat with Vancomycin alone as worsening likely due to lack of source control and not antibiotic failure  4. Anticipate 6 weeks of IV antibiotics   5. ID will follow.        Please call for any questions. Thank you.  Krystina Mendoza PA-C  Phone: [phone]  Pager: [phone]    Subjective:     Principal Problem:Osteomyelitis of thoracic vertebra    HPI: 71 yo female with a history of MRSA bactermia and dementia known to ID service and under the treatment for presumed MRSA osteodiscitis at T9-10.  She had been seen in Oct 2020 after having been admitted to and OSH and found to have MRSA bacteremia that was treated as inpt and dc'd on Zyvox to complete 7 d.  She then presented with excruciating back pain and found to have osteodiscitis at T9-10 and BL ST abscesses.  Blood cultures were no growth at that time and [facility] underwent aspiration at T9-10  and it was no growth.  She was discharged on Vancomycin to complete 6-8 weeks EOC 12/3/20 and she has been at [facility]  She had followed up in ID clinic on  and back pain was improving. Repeat MRI  was done and showed:  1. Altered signal intensity changes at T9/T10 with evidence of erosive focus about the opposing endplates and evidence of enhancing phlegmonous formation identified in the central component of the disc with central low signal intensity compatible with osteomyelitis/discitis.  Vertical dimensions of the area of interest cause extreme erosion along the anterior portion of the vertebral bodies of interest without evidence of any significant paraspinal component.  2. No significant soft tissue component.    NSGY fu was arranged.      FU CT scan was planned and done on  showin.Findings compatible with T9-10 discitis/osteomyelitis with surrounding perivertebral soft tissue thickening.  Evaluation for underlying abscess an epidural process limited given the lack of intravenous contrast.  If there is concern for underlying epidural process, consider further evaluation with an MRI of the thoracic spine.  No retropulsion is noted.  2. Interval development of fracture deformity through the bilateral T9 pedicles and posterior superior cortical margin of the T9 vertebral body    MRI C/T/L done showin. Findings in keeping with known T9-T10 and osteomyelitis/discitis with associated intervertebral disc space abscess, epidural phlegmon with associated mass effect on the thoracic spinal cord and paravertebral phlegmon as discussed above.  Findings do not appear significantly improved when compared to most recent exam of 2020.   2. Subtle T2/STIR signal hyperintensity involving the T3-T4 intervertebral disc space, increased in conspicuity from prior examination. This could reflect an additional region of discitis/osteomyelitis and attention on follow-up exam is advised.   3. No  evidence to suggest osteomyelitis/discitis in the cervical or lumbar spine at this time.  Multilevel degenerative change of the cervical and lumbar spine.   4. Moderate size hiatal hernia and left pleural effusion.     She was admitted and surgery planned.  Her back pain has worsened over the last 2-3 weeks but she denies systemic sing of infection.  Blood cultures are NGTD and crp 12.4.  She remains on vanc and denies PICC problems.  The patient denies any recent fever, chills, or sweats.      Interval History: Patient seen at bedside. Daughter present in room. Tolerated surgery well. Currently has significant back pain and body aches with some post operative nausea. Afebrile. Surgical cx are NGTD. No other acute complaints. Feels very tired today.       Review of Systems   Constitutional: Positive for fatigue. Negative for activity change, chills, diaphoresis and fever.   HENT: Positive for sore throat.    Respiratory: Negative for cough, shortness of breath and wheezing.    Cardiovascular: Negative for chest pain.   Gastrointestinal: Positive for nausea. Negative for abdominal pain, constipation, diarrhea and vomiting.   Genitourinary: Negative for dysuria, frequency and urgency.   Musculoskeletal: Positive for arthralgias and back pain.   Neurological: Negative for dizziness.   Hematological: Does not bruise/bleed easily.     Objective:     Vital Signs (Most Recent):  Temp: 98.2 °F (36.8 °C) (12/09/20 1100)  Pulse: 61 (12/09/20 1200)  Resp: 14 (12/09/20 1200)  BP: (!) 115/51 (12/09/20 1200)  SpO2: 98 % (12/09/20 1200) Vital Signs (24h Range):  Temp:  [97.6 °F (36.4 °C)-98.3 °F (36.8 °C)] 98.2 °F (36.8 °C)  Pulse:  [] 61  Resp:  [13-25] 14  SpO2:  [92 %-100 %] 98 %  BP: (115-155)/(51-76) 115/51  Arterial Line BP: ()/(36-86) 111/86     Weight: 125.4 kg (276 lb 7.3 oz)  Body mass index is 39.67 kg/m².    Estimated Creatinine Clearance: 91.6 mL/min (based on SCr of 0.8 mg/dL).    Physical  Exam  Constitutional:       General: She is not in acute distress.     Appearance: She is well-developed. She is obese. She is ill-appearing. She is not diaphoretic.   HENT:      Head: Normocephalic and atraumatic.      Nose: Nose normal.      Mouth/Throat:      Comments: Dried blood in mouth from intubation  Cardiovascular:      Rate and Rhythm: Normal rate and regular rhythm.      Heart sounds: Normal heart sounds. No murmur. No friction rub. No gallop.    Pulmonary:      Effort: Pulmonary effort is normal. No respiratory distress.      Breath sounds: Normal breath sounds.      Comments: o2 via NC  Abdominal:      General: There is no distension.      Palpations: Abdomen is soft.      Tenderness: There is no abdominal tenderness.   Genitourinary:     Comments: [place]  Musculoskeletal:      Comments: Surgical drains bilaterally with serosanguinous output   Skin:     General: Skin is warm and dry.      Comments: PICC c/d/i   Neurological:      Mental Status: She is alert and oriented to person, place, and time.   Psychiatric:         Behavior: Behavior normal.         Significant Labs:   Blood Culture:   Recent Labs   Lab 09/23/20  1433 09/26/20  1452 10/08/20  2344 10/08/20  2345 12/02/20  0020   LABBLOO Gram stain dale bottle: Gram positive cocci in clusters resembling Staph   Results called to and read back by:Parag Galvan RN 09/24/2020  11:30  Gram stain aer bottle: Gram positive cocci in clusters resembling Staph   Positive results previously called 09/24/2020  13:51  METHICILLIN RESISTANT STAPHYLOCOCCUS AUREUS  ID consult required at [facility] and [facility].  For susceptibility see order #[id number]  * No growth after 5 days. No growth after 5 days. No growth after 5 days. No growth after 5 days.     CBC:   Recent Labs   Lab 12/08/20  0803 12/08/20  1216 12/09/20  0406   WBC  --   --  11.37   HGB  --   --  8.9*   HCT 33* 30* 29.0*   PLT  --   --  258     CMP:   Recent Labs   Lab  12/09/20  0406 12/09/20  1357   * 136   K 3.9 4.1    105   CO2 25 26   * 92   BUN 10 9   CREATININE 0.9 0.8   CALCIUM 9.4 9.5   PROT 5.5*  --    ALBUMIN 2.2*  --    BILITOT 0.2  --    ALKPHOS 99  --    AST 14  --    ALT 7*  --    ANIONGAP 4* 5*   EGFRNONAA >60.0 >60.0     Wound Culture:   Recent Labs   Lab 10/16/20  1228 12/08/20  1139   LABAERO No growth No growth  No growth     All pertinent labs within the past 24 hours have been reviewed.    Significant Imaging: I have reviewed all pertinent imaging results/findings within the past 24 hours.   CT Cervical Spine Without Contrast [[id number]] Resulted: 12/04/20 0626   Order Status: Completed Updated: 12/04/20 0628   Narrative:     EXAMINATION:   CT CERVICAL SPINE WITHOUT CONTRAST     CLINICAL HISTORY:   evaluate for fracture; prevertebral edema at C5-6 on MRI;     TECHNIQUE:   Low dose axial images, sagittal and coronal reformations were performed though the cervical spine.  Contrast was not administered.     COMPARISON:   MRI cervical, thoracic and lumbar spine 12/02/2020, cervical spine MRI 06/01/2015     FINDINGS:   Cervical vertebral body alignment appears to be within normal limits.  Vertebral body heights appear maintained and similar to prior MRI of 2015.  No definite CT evidence to suggest acute cervical spine fracture. The facet joints articulate appropriately. No significant prevertebral soft tissue swelling noting the cervical esophagus is slightly thickened. There is multilevel intervertebral disc height loss and extensive multilevel degenerative change of the visualized cervical spine primarily consisting of multiple posterior disc osteophyte complexes, uncovertebral joint DJD and facet arthropathy.  C4-C5 and C5-C6 levels where there are varying degrees of moderate/severe neural foraminal narrowing bilaterally.  The visualized skull base is intact.  The visualized lung apices demonstrate bilateral pleural fluid.    Impression:        1. No CT evidence to suggest acute cervical spine fracture.  Clinical correlation and further evaluation as warranted.   2. Multilevel degenerative change of the cervical spine most pronounced at the C4-C5 and C5-C6 levels.       Electronically signed by: Christy Mcgee MD   Date: 12/04/2020   Time: 06:26   MRI SPINE CERVICAL-THORACIC-LUMBAR W W/O CONTRAST (XPD) [[id number]] (Abnormal) Resulted: 12/02/20 0626   Order Status: Completed Updated: 12/02/20 0628   Narrative:     EXAMINATION:   MRI SPINE CERVICAL-THORACIC-LUMBAR W W/O CONTRAST (XPD)     CLINICAL HISTORY:   worsening osteo;     TECHNIQUE:   Multiplanar, multisequence MR images of the cervical, thoracic and lumbar spine were performed before and after the administration of 10 cc gadolinium based IV contrast.     COMPARISON:   *CT thoracic spine 11/30/2020 10/13/2020   *MRI thoracic spine 11/09/2020, 10/12/2020   *CT lumbar spine 10/13/2020   *MRI lumbar spine 10/12/2020     FINDINGS:   Cervical spine:     Please note image quality is degraded by patient motion artifact, most notably sagittal postcontrast images.  Cervical vertebral bodies demonstrate no evidence to suggest acute fracture or abnormal infiltrating marrow replacement process.  There is multilevel intervertebral disc desiccation and disc height loss most pronounced at the C5-C6 level.  The craniocervical junction is within normal limits.  The cervical spinal cord is normal in caliber and signal intensity.  There is multilevel degenerative change of the cervical spine consisting of uncovertebral joint DJD, posterior disc osteophyte complexes and facet arthropathy.  Degenerative change most pronounced at the C5-C6 level where there is effacement of the anterior thecal sac and moderate/severe bilateral neural foraminal narrowing (right greater than left).  Following the administration of IV contrast, no pathologic foci of enhancement are appreciated.  Incidentally visualized soft tissue  structures demonstrate a presumed sebaceous cyst in the right posterior extra thoracic soft tissues.     Thoracic:     There is redemonstration of abnormal marrow signal intensity and postcontrast enhancement involving the T9-T10 level consistent with patient's known osteomyelitis/discitis.  There is abnormal fluid signal and peripheral enhancement involving the T9-T10 intervertebral disc space concerning for associated disc space abscess.  There is abnormal signal intensity and postcontrast enhancement involving the anterior and posterior epidural spaces from the T8 through T10 level in keeping with associated epidural phlegmon.  There is associated mass effect on the thoracic spinal cord without definite abnormal intramedullary cord signal intensity.  Phlegmonous change appears to involve the T8-T9 and T9-T10 neural foramina.  There is associated paravertebral phlegmonous change also at these levels.  There is subtle T2/STIR signal hyperintensity involving the T3-T4 intervertebral disc space noting this appears increased in conspicuity from prior examination.  This could reflect an additional region of discitis/osteomyelitis and attention on follow-up exam is advised.  Thoracic vertebral body alignment is stable and there is redemonstration of multilevel degenerative change.  Incidentally visualized intrathoracic structures demonstrate a moderate-sized hiatal hernia and left pleural fluid.     Lumbar spine:     There is grade 2 anterolisthesis of L5 on S1 secondary to bilateral L5 pars defects, unchanged from prior examination.  There is grade 1 anterolisthesis of L3 on L4, also unchanged.  Lumbar vertebral bodies demonstrate no evidence of acute fracture or infiltrating marrow replacement process.  There is multilevel intervertebral disc desiccation.  The conus medullaris is normal in morphology and terminates at the T12-L1 level.  Following the administration of IV contrast, no pathologic foci of enhancement are  appreciated.  There is multilevel degenerative change of the lumbar spine similar to most recent MRI of 10/12/2020.  There is a subcentimeter right renal cortical T2 hyperintensity, likely a cyst.  There is fatty atrophy of the paraspinal musculature.    Impression:       1. Findings in keeping with known T9-T10 and osteomyelitis/discitis with associated intervertebral disc space abscess, epidural phlegmon with associated mass effect on the thoracic spinal cord and paravertebral phlegmon as discussed above.  Findings do not appear significantly improved when compared to most recent exam of 11/09/2020.   2. Subtle T2/STIR signal hyperintensity involving the T3-T4 intervertebral disc space, increased in conspicuity from prior examination. This could reflect an additional region of discitis/osteomyelitis and attention on follow-up exam is advised.   3. No evidence to suggest osteomyelitis/discitis in the cervical or lumbar spine at this time.  Multilevel degenerative change of the cervical and lumbar spine.   4. Moderate size hiatal hernia and left pleural effusion.   This report was flagged in Epic as abnormal.     Electronically signed by resident: Leif Glovre   Date: 12/02/2020   Time: 04:58     Electronically signed by: Christy Mcgee MD   Date: 12/02/2020   Time: 06:26   Imaging History    2020  Date Procedure Name Status Accession Number Location   12/03/20 03:16 PM CT Cervical Spine Without Contrast Final [id number] [facility]   12/02/20 04:03 AM MRI SPINE CERVICAL-THORACIC-LUMBAR W W/O CONTRAST (XPD) Final [id number] [facility]   11/30/20 02:10 PM CT Thoracic Spine Without Contrast Final [id number] [facility]   11/09/20 12:08 PM MRI Thoracic Spine W WO Cont Final [id number] [facility]

## 2020-12-09 NOTE — SUBJECTIVE & OBJECTIVE
Interval History:  NAEON. AFVSS. Patient 1 day S/P T9-10 corpectomy; T7-12 posterior fusion. Pain being managed with Oxycodone 5 & 10 mg PRN, as well as Morphine 2 mg. Patient endorses Nausea and is prescribed Phenergan. Nurse reports denied Phenergan when offered. Patient prescribed Ondansetron as well. Patient A&Ox3 and Neuro exam stable. Patient reports having BM yesterday. Sodium level 131 in the AM.    Review of Systems   Constitutional: Negative for fever.   Respiratory: Negative for shortness of breath and wheezing.    Cardiovascular: Negative for chest pain.   Gastrointestinal: Positive for nausea. Negative for abdominal distention, abdominal pain, constipation and vomiting.   Musculoskeletal: Positive for back pain.   Neurological: Positive for weakness. Negative for numbness.     Objective:     Vitals:  Temp: 98.2 °F (36.8 °C)  Pulse: 61  Rhythm: normal sinus rhythm  BP: (!) 115/51  MAP (mmHg): 73  Resp: 14  SpO2: 98 %  O2 Device (Oxygen Therapy): nasal cannula    Temp  Min: 97.5 °F (36.4 °C)  Max: 98.3 °F (36.8 °C)  Pulse  Min: 60  Max: 103  BP  Min: 115/51  Max: 155/73  MAP (mmHg)  Min: 73  Max: 124  Resp  Min: 13  Max: 25  SpO2  Min: 92 %  Max: 100 %  Oxygen Concentration (%)  Min: 24  Max: 24    12/08 0701 - 12/09 0700  In: 3245.8 [P.O.:1040; I.V.:2105.8]  Out: 2388 [Urine:1590; Drains:398]   Unmeasured Output  Urine Occurrence: 1  Stool Occurrence: 1  Emesis Occurrence: 0       Physical Exam  Constitutional:  Morbidly obese. Well-nourished and developed. No apparent distress.   Eyes: Conjunctiva clear, anicteric. Lids no lesions.  Head/Ears/Nose/Mouth/Throat/Neck: Moist mucous membranes. External ears, nose atraumatic.   Cardiovascular: Regular rhythm. No murmurs. No leg edema.  Respiratory: Using nasal canula. Comfortable respirations.   Gastrointestinal: Soft, nondistended, nontender.   Skin: Thoracic incision examined. Wound vac intact. 2 hemovac drains present. Surrounding area clean, dry, and  intact.      Neurologic:   -[id number]  -Alert. Oriented to person, place, and time. Speech fluent. Follows commands.   -Cranial nerves II-XII intact. PERRLA. EOMi.   -Strength generalized weakness post op 3/5 and symmetric in UE and proximal LE (iliopsoas). Distal BLE strength 4/5. Tone normal.   -Sensation intact to touch in BUE and BLE.    Medications:  Continuoussodium chloride 0.9%, Last Rate: 50 mL/hr at 12/09/20 1200    Scheduledacetaminophen, 1,000 mg, Q8H  bisacodyL, 10 mg, Daily  ceftaroline fosamil, 600 mg, Q8H  donepeziL, 10 mg, QHS  DULoxetine, 60 mg, Daily  heparin (porcine), 5,000 Units, Q8H  memantine, 10 mg, BID  methocarbamoL, 500 mg, QID  metoprolol tartrate, 25 mg, BID  mupirocin, , BID  pantoprazole, 40 mg, Before breakfast  pravastatin, 40 mg, QHS  senna-docusate 8.6-50 mg, 1 tablet, BID    PRNacetaminophen, 650 mg, Q6H PRN  albuterol-ipratropium, 3 mL, Q6H PRN  aluminum-magnesium hydroxide-simethicone, 30 mL, Q4H PRN  dextrose 50%, 12.5 g, PRN  dextrose 50%, 25 g, PRN  glucagon (human recombinant), 1 mg, PRN  glucose, 16 g, PRN  glucose, 16 g, PRN  glucose, 24 g, PRN  insulin aspart U-100, 0-5 Units, QID (AC + HS) PRN  labetaloL, 10 mg, Q4H PRN  morphine, 2 mg, Q1H PRN  ondansetron, 4 mg, Q8H PRN  oxyCODONE, 5 mg, Q6H PRN  oxyCODONE, 10 mg, Q6H PRN  promethazine (PHENERGAN) IVPB, 6.25 mg, Q6H PRN  senna-docusate 8.6-50 mg, 2 tablet, Nightly PRN  vancomycin - pharmacy to dose, , pharmacy to manage frequency      Today I personally reviewed pertinent medications, lines/drains/airways, imaging, cardiology results, laboratory results, microbiology results, notably:    Diet  Diet clear liquid  Diet clear liquid

## 2020-12-09 NOTE — PROGRESS NOTES
Ifrah, PACU RN contacted in regards to pt belongings. No belongings in PACU and no note from OR concerning pt belongings prior to surgery. Will contact pt's daughter to determine if belongings were brought home with her.

## 2020-12-09 NOTE — PLAN OF CARE
Patient in NSCCU S/p   Post-Op Info:  Procedure(s) (LRB):  T9-T10 corpectomy, posterior instrumented fusion T7-T12. (N/A)   1 Day Post-Op    Not medically ready for discharge.   Awaiting post op therapy recs for needs.       12/09/20 1250   Discharge Reassessment   Assessment Type Discharge Planning Reassessment   Provided patient/caregiver education on the expected discharge date and the discharge plan No   Do you have any problems affording any of your prescribed medications? No   Discharge Plan A Skilled Nursing Facility   Discharge Plan B Rehab   DME Needed Upon Discharge  other (see comments)  (tbd)   Anticipated Discharge Disposition SNF   Can the patient/caregiver answer the patient profile reliably? Yes, cognitively intact   How does the patient rate their overall health at the present time? Fair   Describe the patient's ability to walk at the present time. Does not walk or unable to take any steps at all   How often would a person be available to care for the patient? Whenever needed   Number of comorbid conditions (as recorded on the chart) Five or more       Olivia Rosas RN, CCRN-K, [facility]  Neuro-Critical Care   [phone]

## 2020-12-09 NOTE — PLAN OF CARE
Problem: Occupational Therapy Goal  Goal: Occupational Therapy Goal  Description: Goals to be met by: 12/23/20     Patient will increase functional independence with ADLs by performing:    Grooming while seated with Minimal Assistance.  Sitting at edge of bed x10 minutes with Minimal Assistance.  Rolling to Bilateral with Moderate Assistance.   Supine to sit with Moderate Assistance in preparation for EOB/OOB functional activities.    Outcome: Ongoing, Progressing    OT evaluation completed and POC established.  Ayah Elizalde OT  12/9/2020

## 2020-12-09 NOTE — NURSING TRANSFER
Nursing Transfer Note      12/9/2020     Transfer : [id number]    Transfer via bed    Transfer with IVFs, monitoring, & O2 @ 2L/M    Transported by RN    Medicines sent: nasal oint    Chart send with patient: YES     Notified: daughter via text system    Patient reassessed at: 12/08/20@ 2300    Telephone report and BS assessment done.

## 2020-12-09 NOTE — ASSESSMENT & PLAN NOTE
70 F with known MRSA bacteremia with thoracic osteomyelitis presenting as transfer from SNF after CT T spine concerning for increased erosion of T9/T10 vertebral bodies as well as bilateral T9 pedicle fractures:    Neurologically stable on exam.    - Admitted to [facility]  - q1h neuro checks  - Post op imaging pending  -Continue HV drains, abx while in place  - TLSO brace at all times  - Pain control: scheduled Tylenol and Robaxin, Oxycodone PRN  - Recommend repeat TTE as outpatient given changes from 8/10 - 10/12. Pt may have had  NSTEMI. Endocarditis felt less likely.  - H/o MRSA Bacteremia: Afebrile, no leukocytosis. ESR 97, CRP 12.4.    - ID following, appreciate assistance. Continue ceftaroline. Send OR Cx's and pathology.  - CAD, s/p Cardiac Stent: MI in 2000 which required stent placements. Pt takes ASA and Plavix, last dose  11/30.   - Hold ASA/Plavix preoperatively  - Alzheimer's: Continue home memantine and donepezil  - HLD: Continue home pravastatin  -DVT prophylaxis: SHAYLA's, SCD's, SQH.   -Bowel regimen: senna BID     Contact Neurosurgery with any questions, concerns, or neuro changes.

## 2020-12-09 NOTE — SUBJECTIVE & OBJECTIVE
Interval History: NAEON. Pain controlled. Pending therapy eval and post op films.     Medications:  Continuous Infusions:   sodium chloride 0.9% 50 mL/hr at 12/09/20 1000     Scheduled Meds:   acetaminophen  1,000 mg Oral Q8H    bisacodyL  10 mg Rectal Daily    ceftaroline fosamil  600 mg Intravenous Q8H    donepeziL  10 mg Oral QHS    DULoxetine  60 mg Oral Daily    memantine  10 mg Oral BID    methocarbamoL  500 mg Oral QID    metoprolol succinate  50 mg Oral Daily    mupirocin   Nasal BID    pantoprazole  40 mg Oral Before breakfast    pravastatin  40 mg Oral QHS    senna-docusate 8.6-50 mg  1 tablet Oral BID     PRN Meds:acetaminophen, albuterol-ipratropium, aluminum-magnesium hydroxide-simethicone, dextrose 50%, dextrose 50%, glucagon (human recombinant), glucose, glucose, glucose, insulin aspart U-100, labetaloL, morphine, ondansetron, oxyCODONE, oxyCODONE, promethazine (PHENERGAN) IVPB, senna-docusate 8.6-50 mg     Review of Systems  Objective:     Weight: 125.4 kg (276 lb 7.3 oz)  Body mass index is 39.67 kg/m².  Vital Signs (Most Recent):  Temp: 97.9 °F (36.6 °C) (12/09/20 0715)  Pulse: 60 (12/09/20 1100)  Resp: 17 (12/09/20 1100)  BP: (!) 118/58 (12/09/20 0800)  SpO2: 96 % (12/09/20 1100) Vital Signs (24h Range):  Temp:  [97.2 °F (36.2 °C)-98.3 °F (36.8 °C)] 97.9 °F (36.6 °C)  Pulse:  [] 60  Resp:  [13-25] 17  SpO2:  [92 %-100 %] 96 %  BP: (103-155)/(58-78) 118/58  Arterial Line BP: ()/(36-65) 92/45     Date 12/09/20 0700 - 12/10/20 0659   Shift 6207-2641 3806-8882 7671-5039 24 Hour Total   INTAKE   P.O. 500   500   I.V.(mL/kg) 195.8(1.6)   195.8(1.6)   Shift Total(mL/kg) 695.8(5.5)   695.8(5.5)   OUTPUT   Urine(mL/kg/hr) 185   185   Shift Total(mL/kg) 185(1.5)   185(1.5)   Weight (kg) 125.4 125.4 125.4 125.4              Oxygen Concentration (%):  [6-24] 24         Closed/Suction Drain 12/08/20 1239 Right Back Accordion 10 Fr. (Active)   Site Description Unable to view 12/09/20  "0700   Dressing Type Biopatch in place 12/09/20 0700   Dressing Status Clean;Dry;Intact 12/09/20 0700   Dressing Intervention Integrity maintained 12/09/20 0700   Drainage Serosanguineous 12/09/20 0700   Status To bulb suction 12/09/20 0700   Output (mL) 110 mL 12/09/20 0605            Closed/Suction Drain 12/08/20 1239 Left Back Accordion 10 Fr. (Active)   Site Description Unable to view 12/09/20 0700   Dressing Type Biopatch in place 12/09/20 0700   Dressing Status Clean;Dry;Intact 12/09/20 0700   Dressing Intervention Integrity maintained 12/09/20 0700   Drainage Serosanguineous 12/09/20 0700   Status To bulb suction 12/09/20 0700   Output (mL) 3 mL 12/08/20 2200            Urethral Catheter 12/08/20 0830 Non-latex;Straight-tip 16 Fr. (Active)   Site Assessment Clean;Intact 12/09/20 0700   Collection Container Urimeter 12/09/20 0700   Securement Method secured to top of thigh w/ adhesive device 12/09/20 0700   Catheter Care Performed yes 12/09/20 0700   Reason for Continuing Urinary Catheterization Post operative 12/09/20 0700   CAUTI Prevention Bundle StatLock in place w 1" slack;Intact seal between catheter & drainage tubing;Drainage bag/urimeter off the floor;Green sheeting clip in use;No dependent loops or kinks;Drainage bag/urimeter not overfilled (<2/3 full);Drainage bag/urimeter below bladder 12/09/20 0700   Output (mL) 60 mL 12/09/20 0800       Neurosurgery Physical Exam   Awake, alert, NAD  Brace in place  EOMI, PERRL  FS in BUE  Pain limited proximal leg weakness 3+/5, L>R  4/5 at knee bilaterally  5/5 distally  SILT    Significant Labs:  Recent Labs   Lab 12/09/20  0406   *   *   K 3.9      CO2 25   BUN 10   CREATININE 0.9   CALCIUM 9.4   MG 1.7     Recent Labs   Lab 12/08/20  0803 12/08/20  1216 12/09/20  0406   WBC  --   --  11.37   HGB  --   --  8.9*   HCT 33* 30* 29.0*   PLT  --   --  258     No results for input(s): LABPT, INR, APTT in the last 48 hours.  Microbiology Results " (last 7 days)     Procedure Component Value Units Date/Time    Aerobic culture [[id number]] Collected: 12/08/20 1139    Order Status: Completed Specimen: Back Updated: 12/09/20 0912     Aerobic Bacterial Culture No growth    Narrative:      T9-10 disc space    Aerobic culture [[id number]] Collected: 12/08/20 1139    Order Status: Completed Specimen: Back Updated: 12/09/20 0912     Aerobic Bacterial Culture No growth    Narrative:      T9-10 disc space    Culture, Anaerobe [[id number]] Collected: 12/08/20 1139    Order Status: Completed Specimen: Back Updated: 12/09/20 0730     Anaerobic Culture Culture in progress    Narrative:      T9-10 disc space    Culture, Anaerobe [[id number]] Collected: 12/08/20 1139    Order Status: Completed Specimen: Back Updated: 12/09/20 0730     Anaerobic Culture Culture in progress    Narrative:      T9-10 disc space    Gram stain [[id number]] Collected: 12/08/20 1139    Order Status: Completed Specimen: Back Updated: 12/08/20 1652     Gram Stain Result Rare WBC's      No organisms seen    Narrative:      T9-10 disc space    Gram stain [[id number]] Collected: 12/08/20 1139    Order Status: Completed Specimen: Back Updated: 12/08/20 1651     Gram Stain Result Rare WBC's      No organisms seen    Narrative:      T9-10 disc space    Fungus culture [[id number]] Collected: 12/08/20 1139    Order Status: Sent Specimen: Back Updated: 12/08/20 1220    Fungus culture [[id number]] Collected: 12/08/20 1139    Order Status: Sent Specimen: Back Updated: 12/08/20 1219    Blood culture [[id number]] Collected: 12/02/20 0020    Order Status: Completed Specimen: Blood Updated: 12/07/20 0612     Blood Culture, Routine No growth after 5 days.          Significant Diagnostics:  No results found in the last 24 hours.

## 2020-12-09 NOTE — ANESTHESIA POSTPROCEDURE EVALUATION
Anesthesia Post Evaluation    Patient: Martina Betancourt    Procedure(s) Performed: Procedure(s) (LRB):  T9-T10 corpectomy, posterior instrumented fusion T7-T12. (N/A)    Final Anesthesia Type: general    Patient location during evaluation: ICU  Patient participation: Yes- Able to Participate  Level of consciousness: awake and alert and oriented  Post-procedure vital signs: reviewed and stable  Pain management: adequate  Airway patency: patent  YOVANI mitigation strategies: Postoperative administration of CPAP, nasopharyngeal airway, or oral appliance in the postanesthesia care unit (PACU)  PONV status at discharge: No PONV  Anesthetic complications: no      Cardiovascular status: blood pressure returned to baseline and hemodynamically stable  Respiratory status: spontaneous ventilation, unassisted and nasal cannula  Hydration status: euvolemic  Follow-up not needed.          Vitals Value Taken Time   /63 12/09/20 0605   Temp 36.8 °C (98.3 °F) 12/09/20 0555   Pulse 66 12/09/20 0655   Resp 15 12/09/20 0655   SpO2 95 % 12/09/20 0655   Vitals shown include unvalidated device data.      Event Time   Out of Recovery 12/09/2020 00:35:00         Pain/Umang Score: Pain Rating Prior to Med Admin: 0 (12/9/2020  5:55 AM)  Pain Rating Post Med Admin: 0 (12/8/2020 11:00 PM)  Umang Score: 10 (12/8/2020  8:00 PM)

## 2020-12-09 NOTE — MEDICAL/APP STUDENT
[facility]  Progress Note    Patient Name: Martina Betancourt  MRN: [id number]  Patient Class: IP- Inpatient   Admission Date: 12/1/2020  Length of Stay: 8 days  Attending Physician: Everardo Gongora MD  Primary Care Provider: Joe Fuller Iii, MD    Subjective:          Assessment/Plan:      No notes on file    Active Diagnoses:    Diagnosis Date Noted POA    PRINCIPAL PROBLEM:  Osteomyelitis of thoracic vertebra [M46.24] 12/02/2020 Yes    Pre-operative cardiovascular examination [Z01.810] 12/02/2020 Not Applicable    Thoracic spine fracture [S22.009A]  Yes    Debility [R53.81] 10/21/2020 Yes    HTN (hypertension) [I10] 10/08/2020 Yes    Discitis of thoracic region [M46.44] 10/07/2020 Yes    Obstructive sleep apnea treated with continuous positive airway pressure (CPAP) [G47.33, Z99.89] 08/10/2020 Not Applicable    Severe obesity (BMI >= 40) [E66.01] 08/10/2020 Yes    MRSA bacteremia [R78.81, B95.62] 07/26/2020 Yes    Dementia without behavioral disturbance [F03.90] 07/26/2020 Yes      Problems Resolved During this Admission:     VTE Risk Mitigation (From admission, onward)         Ordered     Place SHAYLA hose  Until discontinued      12/08/20 1352     IP VTE HIGH RISK PATIENT  Once      12/08/20 1352     Place sequential compression device  Until discontinued      12/08/20 1352     Place sequential compression device  Until discontinued      12/01/20 2235     Place SHAYLA hose  Until discontinued      12/01/20 2235                   Abelino Max  [facility]

## 2020-12-09 NOTE — PROGRESS NOTES
Phone xray RE : MD ordered for ap lateral xray post op, Radilogidt mentioned will have xray tomorrow, Team informed, Pt remain stable and comfortable

## 2020-12-09 NOTE — PROGRESS NOTES
Pharmacokinetic Initial Assessment: IV Vancomycin    Assessment/Plan:    - Initiate intravenous vancomycin with loading dose of 2000 mg once  - See-saw effect with ceftaroline for MRSA osteomyelitis   - Previously maintained on 750 mg Q24H, but was subtherapeutic  - Maintenance dose 1250 mg Q24H  - Goal trough 15-20 mcg/mL  - Draw trough prior to third dose on 12/11 at 15:00    Pharmacy will continue to follow and monitor vancomycin. Please contact pharmacy at extension [phone] with any questions regarding this assessment.     Thank you for the consult,   Melisa Husain, PharmD, [facility]  Neurocritical Care Pharmacist  [phone]       Patient brief summary:  Martina Betancourt is a 70 y.o. female initiated on antimicrobial therapy with IV Vancomycin for treatment of suspected bone/joint infection    Drug Allergies:   Review of patient's allergies indicates:   Allergen Reactions    Hydroxyzine hcl     Tizanidine        Actual Body Weight:   125.4 kg    Renal Function:   Estimated Creatinine Clearance: 91.6 mL/min (based on SCr of 0.8 mg/dL).,     Dialysis Method (if applicable):  N/A    CBC (last 72 hours):  Recent Labs   Lab Result Units 12/09/20  0406   WBC K/uL 11.37   Hemoglobin g/dL 8.9*   Hematocrit % 29.0*   Platelets K/uL 258   Gran % % 79.4*   Lymph % % 10.3*   Mono % % 9.7   Eosinophil % % 0.0   Basophil % % 0.1   Differential Method  Automated       Metabolic Panel (last 72 hours):  Recent Labs   Lab Result Units 12/09/20  0406 12/09/20  1357   Sodium mmol/L 131* 136   Potassium mmol/L 3.9 4.1   Chloride mmol/L 102 105   CO2 mmol/L 25 26   Glucose mg/dL 119* 92   BUN mg/dL 10 9   Creatinine mg/dL 0.9 0.8   Albumin g/dL 2.2*  --    Total Bilirubin mg/dL 0.2  --    Alkaline Phosphatase U/L 99  --    AST U/L 14  --    ALT U/L 7*  --    Magnesium mg/dL 1.7  --    Phosphorus mg/dL 3.2  --        Drug levels (last 3 results):  No results for input(s): VANCOMYCINRA, VANCOMYCINPE, VANCOMYCINTR in the last 72  hours.    Microbiologic Results:  Microbiology Results (last 7 days)     Procedure Component Value Units Date/Time    Aerobic culture [[id number]] Collected: 12/08/20 1139    Order Status: Completed Specimen: Back Updated: 12/09/20 0912     Aerobic Bacterial Culture No growth    Narrative:      T9-10 disc space    Aerobic culture [[id number]] Collected: 12/08/20 1139    Order Status: Completed Specimen: Back Updated: 12/09/20 0912     Aerobic Bacterial Culture No growth    Narrative:      T9-10 disc space    Culture, Anaerobe [[id number]] Collected: 12/08/20 1139    Order Status: Completed Specimen: Back Updated: 12/09/20 0730     Anaerobic Culture Culture in progress    Narrative:      T9-10 disc space    Culture, Anaerobe [[id number]] Collected: 12/08/20 1139    Order Status: Completed Specimen: Back Updated: 12/09/20 0730     Anaerobic Culture Culture in progress    Narrative:      T9-10 disc space    Gram stain [[id number]] Collected: 12/08/20 1139    Order Status: Completed Specimen: Back Updated: 12/08/20 1652     Gram Stain Result Rare WBC's      No organisms seen    Narrative:      T9-10 disc space    Gram stain [[id number]] Collected: 12/08/20 1139    Order Status: Completed Specimen: Back Updated: 12/08/20 1651     Gram Stain Result Rare WBC's      No organisms seen    Narrative:      T9-10 disc space    Fungus culture [[id number]] Collected: 12/08/20 1139    Order Status: Sent Specimen: Back Updated: 12/08/20 1220    Fungus culture [[id number]] Collected: 12/08/20 1139    Order Status: Sent Specimen: Back Updated: 12/08/20 1219    Blood culture [[id number]] Collected: 12/02/20 0020    Order Status: Completed Specimen: Blood Updated: 12/07/20 0612     Blood Culture, Routine No growth after 5 days.

## 2020-12-09 NOTE — PROGRESS NOTES
[facility]  Neurocritical Care  Progress Note    Admit Date: 12/1/2020  Service Date: 12/09/2020  Length of Stay: 8    Subjective:     Chief Complaint: Osteomyelitis of thoracic vertebra    History of Present Illness:    70 y.o. [place] [facility] [place]H of Alzheimer's dz, HTN, HLD, CAD, YOVANI, and morbid obesity who was recently hospitalized x2 for MRSA bacteremia complicated by pneumonia and possible UTI, treated with linozolid x8 days and cipro and discharged 10/1. She was then read[place] [facility] [place]8 with severe back pain. MRI revealed T9-10 osteodiscitis and T8-10 epidural phlegmon with associated paraverterbral abscesses. Spine infection likely hematogenous from under-treated bacteremia. Patient had no neurologic deficits on exam, no indications for emergent neurosurgical intervention, plan was made for conservative non-surgical treatment. She underwent needle aspiration of T9-10 disc space on 10/16, cultures were negative although she had already been undergoing antibiotic treatment. Repeat BCx remained no growth and she remained afebrile and neurologically stable. Patient was discharged to [facility] on 10/20 with plan for Vancomycin IV x 8 weeks (estimated end date 12/3).       Patient presents 12/1 after interval imaging obtained 11/30 revealed worsening bony destruction, unstable T9 fracture. Transferred to [facility] [place]GY eval.        12/8/2020 will admit to [facility] S/P  T9-10 corpectomy; T7-12 posterior fusion POD#0. Wound vac to incision for 5days and hemovac drains x2 to full suction.        Hospital Course: 12/8/2020 admit to [facility]. S/P T9-10 corpectomy; T7-12 posterior fusion POD#0, 2 hemovac drains to full suction and wound vac to incision for 5 days. Thoracic spine AP xray post-op, pending.  12/9: LUCAS BAUTISTA. Patient 1 day S/P T9-10 corpectomy; T7-12 posterior fusion. Pain well controlled. Patient A&Ox3 and Neuro exam stable. Patient reports BM yesterday. Sodium level  131 in the AM.    Interval History:  NAEON. AFVSS. Patient 1 day S/P T9-10 corpectomy; T7-12 posterior fusion. Pain being managed with Oxycodone 5 & 10 mg PRN, as well as Morphine 2 mg. Patient endorses Nausea and is prescribed Phenergan. Nurse reports denied Phenergan when offered. Patient prescribed Ondansetron as well. Patient A&Ox3 and Neuro exam stable. Patient reports having BM yesterday. Sodium level 131 in the AM.    Review of Systems   Constitutional: Negative for fever.   Respiratory: Negative for shortness of breath and wheezing.    Cardiovascular: Negative for chest pain.   Gastrointestinal: Positive for nausea. Negative for abdominal distention, abdominal pain, constipation and vomiting.   Musculoskeletal: Positive for back pain.   Neurological: Positive for weakness. Negative for numbness.     Objective:     Vitals:  Temp: 98.2 °F (36.8 °C)  Pulse: 61  Rhythm: normal sinus rhythm  BP: (!) 115/51  MAP (mmHg): 73  Resp: 14  SpO2: 98 %  O2 Device (Oxygen Therapy): nasal cannula    Temp  Min: 97.5 °F (36.4 °C)  Max: 98.3 °F (36.8 °C)  Pulse  Min: 60  Max: 103  BP  Min: 115/51  Max: 155/73  MAP (mmHg)  Min: 73  Max: 124  Resp  Min: 13  Max: 25  SpO2  Min: 92 %  Max: 100 %  Oxygen Concentration (%)  Min: 24  Max: 24    12/08 0701 - 12/09 0700  In: 3245.8 [P.O.:1040; I.V.:2105.8]  Out: 2388 [Urine:1590; Drains:398]   Unmeasured Output  Urine Occurrence: 1  Stool Occurrence: 1  Emesis Occurrence: 0       Physical Exam  Constitutional:  Morbidly obese. Well-nourished and developed. No apparent distress.   Eyes: Conjunctiva clear, anicteric. Lids no lesions.  Head/Ears/Nose/Mouth/Throat/Neck: Moist mucous membranes. External ears, nose atraumatic.   Cardiovascular: Regular rhythm. No murmurs. No leg edema.  Respiratory: Using nasal canula. Comfortable respirations.   Gastrointestinal: Soft, nondistended, nontender.   Skin: Thoracic incision examined. Wound vac intact. 2 hemovac drains present. Surrounding area  clean, dry, and intact.      Neurologic:   -[id number]  -Alert. Oriented to person, place, and time. Speech fluent. Follows commands.   -Cranial nerves II-XII intact. PERRLA. EOMi.   -Strength generalized weakness post op 3/5 and symmetric in UE and proximal LE (iliopsoas). Distal BLE strength 4/5. Tone normal.   -Sensation intact to touch in BUE and BLE.    Medications:  Continuoussodium chloride 0.9%, Last Rate: 50 mL/hr at 12/09/20 1200    Scheduledacetaminophen, 1,000 mg, Q8H  bisacodyL, 10 mg, Daily  ceftaroline fosamil, 600 mg, Q8H  donepeziL, 10 mg, QHS  DULoxetine, 60 mg, Daily  heparin (porcine), 5,000 Units, Q8H  memantine, 10 mg, BID  methocarbamoL, 500 mg, QID  metoprolol tartrate, 25 mg, BID  mupirocin, , BID  pantoprazole, 40 mg, Before breakfast  pravastatin, 40 mg, QHS  senna-docusate 8.6-50 mg, 1 tablet, BID    PRNacetaminophen, 650 mg, Q6H PRN  albuterol-ipratropium, 3 mL, Q6H PRN  aluminum-magnesium hydroxide-simethicone, 30 mL, Q4H PRN  dextrose 50%, 12.5 g, PRN  dextrose 50%, 25 g, PRN  glucagon (human recombinant), 1 mg, PRN  glucose, 16 g, PRN  glucose, 16 g, PRN  glucose, 24 g, PRN  insulin aspart U-100, 0-5 Units, QID (AC + HS) PRN  labetaloL, 10 mg, Q4H PRN  morphine, 2 mg, Q1H PRN  ondansetron, 4 mg, Q8H PRN  oxyCODONE, 5 mg, Q6H PRN  oxyCODONE, 10 mg, Q6H PRN  promethazine (PHENERGAN) IVPB, 6.25 mg, Q6H PRN  senna-docusate 8.6-50 mg, 2 tablet, Nightly PRN  vancomycin - pharmacy to dose, , pharmacy to manage frequency      Today I personally reviewed pertinent medications, lines/drains/airways, imaging, cardiology results, laboratory results, microbiology results, notably:    Diet  Diet clear liquid  Diet clear liquid        Assessment/Plan:     Neuro  Thoracic spine fracture  See above    Dementia without behavioral disturbance  Continue donepezil 10mg qhs  memantine 10mg bid    Cardiac/Vascular  HTN (hypertension)  SBP goal 100-160  Discontinue Metoprolol 50mg xl  Start Metoprolol tartrate  "25 mg BID  PRN Labetalol 10 mg    Renal/  Hyponatremia  Continue 0.9% NaCl infusion  Sodium level 131 in AM  Repeat BMP  Repeat Serum Na    ID  * Osteomyelitis of thoracic vertebra   69y/o F with hx of thoracic spine fracture; 10/8 with severe back pain. MRI revealed T9-10 osteodiscitis and T8-10 epidural phlegmon with associated paraverterbral abscesses. Spine infection likely hematogenous from under-treated bacteremia.    S/P T9-10 corpectomy; T7-12 posterior fusion POD#0 with 2 hemovac drains to full suction  Wound vac intact to incision for 5 days  NSGY following  ID following  TLSO brace in place when up  -160, MAP > 65  Tylenol 1g q8h  ceftaroline 600mg q8h  Start Vanc 2 g per ID  methacarbmol 500mg qid  mupiricin oint. To nares bid  Pain regimen oxycodone 5mg q6h prn moderate pain 10mg for severe pain  Morphine 2mg q1h severe pain not relieved by oral meds  PT/OT/SLP when appropriate        Discitis of thoracic region  POA   see osteomyelitis thoracic vertebra  ID following  On ABX    MRSA bacteremia  POA  ID following   was on IV vancomycin for 6-8 weeks per ID ( estimated end date 12/3)  Continue ceftaroline 600mg q8h  Start Vanc 2 mg per ID  Bone/tissue sent from OR for pathology, gram stain, aerobic/anaerobic, AFB and fungal cultures.  anticipate 6 weeks ABX post op     Endocrine  Severe obesity (BMI >= 40)  Estimated body mass index is 39.67 kg/m² as calculated from the following:    Height as of this encounter: 5' 10" (1.778 m).    Weight as of this encounter: 125.4 kg (276 lb 7.3 oz).    Other  Debility  PT/OT following    Obstructive sleep apnea treated with continuous positive airway pressure (CPAP)  CPAP prn          The patient is being Prophylaxed for:  Venous Thromboembolism with: Chemical  Stress Ulcer with: PPI  Ventilator Pneumonia with: not applicable    Activity Orders          Diet clear liquid: Clear Liquid starting at 12/08 1330    Straight Cath starting at 12/04 1230        Full " Code    Neetu Pemberton PA-C  Neurocritical Care  [facility]

## 2020-12-09 NOTE — PLAN OF CARE
Problem: Physical Therapy Goal  Goal: Physical Therapy Goal  Description: Goals to be met by: 2020     Patient will increase functional independence with mobility by performin. Supine to sit with MInimal Assistance  2. Sit to supine with MInimal Assistance  3. Rolling to Left and Right with Minimal Assistance.  4. Bed to chair transfer with Maximum Assistance using Slideboard  5. Sitting at edge of bed x8 minutes with Stand-by Assistance  6. Lower extremity exercise program x30 reps per handout, with independence    Outcome: Ongoing, Progressing     PT eval complete. This patient is an excellent candidate for inpatient rehabilitation, has a qualifying diagnosis, shows increasing activity tolerance,  is motivated to participate in treatment, and has a supportive family willing to assist the patient upon discharge.     Liss Lacey, PT, DPT  2020

## 2020-12-09 NOTE — ASSESSMENT & PLAN NOTE
POA  ID following   was on IV vancomycin for 6-8 weeks per ID ( estimated end date 12/3)  Continue ceftaroline 600mg q8h  Start Vanc 2 mg per ID  Bone/tissue sent from OR for pathology, gram stain, aerobic/anaerobic, AFB and fungal cultures.  anticipate 6 weeks ABX post op

## 2020-12-09 NOTE — PT/OT/SLP EVAL
Occupational Therapy   Co-Evaluation    Name: Martina Betancourt  MRN: [id number]  Admitting Diagnosis:  Osteomyelitis of thoracic vertebra 1 Day Post-Op  Procedure(s):  T9-T10 corpectomy, posterior instrumented fusion T7-T12.     Recommendations:     Discharge Recommendations: rehabilitation facility(return to rehab)  Discharge Equipment Recommendations:  (TBD at next level of care)  Barriers to discharge:  Decreased caregiver support(Requires increased skilled assistance)    Assessment:     Martina Betancourt is a 70 y.o. female with a medical diagnosis of Osteomyelitis of thoracic vertebra.  She presents with a decline in occupational participation and functional mobility. Patient is agreeable to participate but upon attempt to sit up EOB, patient reports increased pain and EOB/OOB activity deferred this day. RN present and assists with adjustment of TLSO. Daughter present at bedside to assist with social history. Performance deficits affecting function: weakness, impaired endurance, impaired self care skills, impaired functional mobilty, gait instability, impaired balance, decreased upper extremity function, decreased lower extremity function, pain, impaired skin, impaired cardiopulmonary response to activity.      Rehab Prognosis: Fair; patient would benefit from acute skilled OT services to address these deficits and reach maximum level of function.       Plan:     Patient to be seen 4 x/week to address the above listed problems via self-care/home management, therapeutic activities, therapeutic exercises, neuromuscular re-education  · Plan of Care Expires: 01/09/21  · Plan of Care Reviewed with: patient, daughter    Subjective     Chief Complaint: Back pain, discomfort with TLSO  Patient/Family Comments/goals: To feel better    Occupational Profile:  Living Environment: Patient lives with daughter in a single story home with with 0 steps to enter. Patient's bathroom has a tub/shower.  Previous level of function:  Independent prior to June but since June with multiple hospitalizations, patient requires assistance with ADLs and functional transfers. Patient uses hoveround scooter for mobility.  Roles and Routines: Mother. Patient does not drive or work.  Equipment Used at Home:  bedside commode, shower chair, slide board, walker, rolling, wheelchair, grab bar(hoveround scooter)  Assistance upon Discharge: Daughter    Pain/Comfort:  · Pain Rating 1: 8/10  · Location 1: back  · Pain Addressed 1: Reposition, Distraction, Nurse notified, Cessation of Activity  · Pain Rating Post-Intervention 1: 8/10    Patients cultural, spiritual, Adventism conflicts given the current situation: no    Objective:     Communicated with: RN prior to session.  Patient found HOB elevated with arterial line, blood pressure cuff, null catheter, hemovac, oxygen, peripheral IV, pulse ox (continuous), SCD, telemetry, TLSO upon OT entry to room.    General Precautions: Standard, fall   Orthopedic Precautions:spinal precautions   Braces: TLSO     Occupational Performance:    Bed Mobility:    · Patient completed supine rolling L<>R x2 trials with total assist x2    Functional Mobility/Transfers:  · deferred    Activities of Daily Living:  · Feeding:  moderate assistance Patient drank water from a cup with a straw with mod assist while in bed.  · Upper Body Dressing: total assistance Patient adjusted TLSO brace while supine rolling with total assist 2' discomfort.    Cognitive/Visual Perceptual:  Cognitive/Psychosocial Skills:     -       Oriented to: Person, Place, Time and Situation   -       Follows Commands/attention:Follows multistep  commands    Physical Exam:  Upper Extremity Range of Motion:     -       Right Upper Extremity: WFL except did not observe shoulder flex/ext 2' pain  -       Left Upper Extremity: WFL except did not observe shoulder flex/ext 2' pain  Upper Extremity Strength:    -       Right Upper Extremity: DNT 2' pain  -       Left  Upper Extremity: DNT 2' pain   Strength:    -       Right Upper Extremity: WFL  -       Left Upper Extremity: WFL  Fine Motor Coordination:    -       Intact  Left hand thumb/finger opposition skills and Right hand thumb/finger opposition skills    AMPAC 6 Click ADL:  AMPAC Total Score: 8    Treatment & Education:  Role of OT/evaluation  Educated on spinal precautions  Educated on log rolling technique  Call button for assistance    Patient left HOB elevated with all lines intact, call button in reach and RN and daughter present    GOALS:   Multidisciplinary Problems     Occupational Therapy Goals        Problem: Occupational Therapy Goal    Goal Priority Disciplines Outcome Interventions   Occupational Therapy Goal     OT, PT/OT Ongoing, Progressing    Description: Goals to be met by: 12/23/20     Patient will increase functional independence with ADLs by performing:    Grooming while seated with Minimal Assistance.  Sitting at edge of bed x10 minutes with Minimal Assistance.  Rolling to Bilateral with Moderate Assistance.   Supine to sit with Moderate Assistance in preparation for EOB/OOB functional activities.                     History:     Past Medical History:   Diagnosis Date    Alzheimer disease     Alzheimer disease     Coronary artery disease     Hyperlipidemia     Hypertension     Myopathy     Non-ST elevation (NSTEMI) myocardial infarction     Obesity     Pneumonia     Sleep apnea        Past Surgical History:   Procedure Laterality Date    APPENDECTOMY      APPENDECTOMY      CHOLECYSTECTOMY      CORONARY STENT PLACEMENT      HYSTERECTOMY      SURGICAL REMOVAL OF VERTEBRAL BODY OF THORACIC SPINE N/A 12/8/2020    Procedure: T9-T10 corpectomy, posterior instrumented fusion T7-T12.;  Surgeon: Everardo Gongora MD;  Location: [facility] OR [address] [place] [address];  Service: Neurosurgery;  Laterality: N/A;    TONSILLECTOMY         Time Tracking:     OT Date of Treatment: 12/09/20  OT Start Time: 1456  OT Stop  Time: 1528  OT Total Time (min): 32 min    Billable Minutes:Evaluation 8 minutes  Self Care/Home Management 10 minutes  Neuromuscular Re-education 14 minutes    Ayah Elizalde OT  12/9/2020

## 2020-12-09 NOTE — ASSESSMENT & PLAN NOTE
SBP goal 100-160  Discontinue Metoprolol 50mg xl  Start Metoprolol tartrate 25 mg BID  PRN Labetalol 10 mg

## 2020-12-09 NOTE — PROGRESS NOTES
[facility]  Neurosurgery  Progress Note    Subjective:     History of Present Illness: 70 y.o. female known to [facility] with PMH of Alzheimer's dz, HTN, HLD, CAD, YOVANI, and morbid obesity who was recently hospitalized x2 for MRSA bacteremia complicated by pneumonia and possible UTI, treated with linozolid x8 days and cipro and discharged 10/1. She was then readmitted to [facility] on 10/8 with severe back pain. MRI revealed T9-10 osteodiscitis and T8-10 epidural phlegmon with associated paraverterbral abscesses. Spine infection likely hematogenous from under-treated bacteremia. Patient had no neurologic deficits on exam, no indications for emergent neurosurgical intervention, plan was made for conservative non-surgical treatment. She underwent needle aspiration of T9-10 disc space on 10/16, cultures were negative although she had already been undergoing antibiotic treatment. Repeat BCx remained no growth and she remained afebrile and neurologically stable. Patient was discharged to [facility] on 10/20 with plan for Vancomycin IV x 8 weeks (estimated end date 12/3).    Patient presents today after interval imaging obtained 11/30 revealed worsening bony destruction, unstable T9 fracture. Transferred for [facility] eval.     Post-Op Info:  Procedure(s) (LRB):  T9-T10 corpectomy, posterior instrumented fusion T7-T12. (N/A)   1 Day Post-Op     Interval History: NAEON. Pain controlled. Pending therapy eval and post op films.     Medications:  Continuous Infusions:   sodium chloride 0.9% 50 mL/hr at 12/09/20 1000     Scheduled Meds:   acetaminophen  1,000 mg Oral Q8H    bisacodyL  10 mg Rectal Daily    ceftaroline fosamil  600 mg Intravenous Q8H    donepeziL  10 mg Oral QHS    DULoxetine  60 mg Oral Daily    memantine  10 mg Oral BID    methocarbamoL  500 mg Oral QID    metoprolol succinate  50 mg Oral Daily    mupirocin   Nasal BID    pantoprazole  40 mg Oral Before breakfast    pravastatin  40 mg  Oral QHS    senna-docusate 8.6-50 mg  1 tablet Oral BID     PRN Meds:acetaminophen, albuterol-ipratropium, aluminum-magnesium hydroxide-simethicone, dextrose 50%, dextrose 50%, glucagon (human recombinant), glucose, glucose, glucose, insulin aspart U-100, labetaloL, morphine, ondansetron, oxyCODONE, oxyCODONE, promethazine (PHENERGAN) IVPB, senna-docusate 8.6-50 mg     Review of Systems  Objective:     Weight: 125.4 kg (276 lb 7.3 oz)  Body mass index is 39.67 kg/m².  Vital Signs (Most Recent):  Temp: 97.9 °F (36.6 °C) (12/09/20 0715)  Pulse: 60 (12/09/20 1100)  Resp: 17 (12/09/20 1100)  BP: (!) 118/58 (12/09/20 0800)  SpO2: 96 % (12/09/20 1100) Vital Signs (24h Range):  Temp:  [97.2 °F (36.2 °C)-98.3 °F (36.8 °C)] 97.9 °F (36.6 °C)  Pulse:  [] 60  Resp:  [13-25] 17  SpO2:  [92 %-100 %] 96 %  BP: (103-155)/(58-78) 118/58  Arterial Line BP: ()/(36-65) 92/45     Date 12/09/20 0700 - 12/10/20 0659   Shift 8043-1698 3054-8511 0706-3551 24 Hour Total   INTAKE   P.O. 500   500   I.V.(mL/kg) 195.8(1.6)   195.8(1.6)   Shift Total(mL/kg) 695.8(5.5)   695.8(5.5)   OUTPUT   Urine(mL/kg/hr) 185   185   Shift Total(mL/kg) 185(1.5)   185(1.5)   Weight (kg) 125.4 125.4 125.4 125.4              Oxygen Concentration (%):  [6-24] 24         Closed/Suction Drain 12/08/20 1239 Right Back Accordion 10 Fr. (Active)   Site Description Unable to view 12/09/20 0700   Dressing Type Biopatch in place 12/09/20 0700   Dressing Status Clean;Dry;Intact 12/09/20 0700   Dressing Intervention Integrity maintained 12/09/20 0700   Drainage Serosanguineous 12/09/20 0700   Status To bulb suction 12/09/20 0700   Output (mL) 110 mL 12/09/20 0605            Closed/Suction Drain 12/08/20 1239 Left Back Accordion 10 Fr. (Active)   Site Description Unable to view 12/09/20 0700   Dressing Type Biopatch in place 12/09/20 0700   Dressing Status Clean;Dry;Intact 12/09/20 0700   Dressing Intervention Integrity maintained 12/09/20 0700   Drainage  "Serosanguineous 12/09/20 0700   Status To bulb suction 12/09/20 0700   Output (mL) 3 mL 12/08/20 2200            Urethral Catheter 12/08/20 0830 Non-latex;Straight-tip 16 Fr. (Active)   Site Assessment Clean;Intact 12/09/20 0700   Collection Container Urimeter 12/09/20 0700   Securement Method secured to top of thigh w/ adhesive device 12/09/20 0700   Catheter Care Performed yes 12/09/20 0700   Reason for Continuing Urinary Catheterization Post operative 12/09/20 0700   CAUTI Prevention Bundle StatLock in place w 1" slack;Intact seal between catheter & drainage tubing;Drainage bag/urimeter off the floor;Green sheeting clip in use;No dependent loops or kinks;Drainage bag/urimeter not overfilled (<2/3 full);Drainage bag/urimeter below bladder 12/09/20 0700   Output (mL) 60 mL 12/09/20 0800       Neurosurgery Physical Exam   Awake, alert, NAD  Brace in place  EOMI, PERRL  FS in BUE  Pain limited proximal leg weakness 3+/5, L>R  4/5 at knee bilaterally  5/5 distally  SILT    Significant Labs:  Recent Labs   Lab 12/09/20  0406   *   *   K 3.9      CO2 25   BUN 10   CREATININE 0.9   CALCIUM 9.4   MG 1.7     Recent Labs   Lab 12/08/20  0803 12/08/20  1216 12/09/20  0406   WBC  --   --  11.37   HGB  --   --  8.9*   HCT 33* 30* 29.0*   PLT  --   --  258     No results for input(s): LABPT, INR, APTT in the last 48 hours.  Microbiology Results (last 7 days)     Procedure Component Value Units Date/Time    Aerobic culture [[id number]] Collected: 12/08/20 1139    Order Status: Completed Specimen: Back Updated: 12/09/20 0912     Aerobic Bacterial Culture No growth    Narrative:      T9-10 disc space    Aerobic culture [[id number]] Collected: 12/08/20 1139    Order Status: Completed Specimen: Back Updated: 12/09/20 0912     Aerobic Bacterial Culture No growth    Narrative:      T9-10 disc space    Culture, Anaerobe [[id number]] Collected: 12/08/20 1139    Order Status: Completed Specimen: Back Updated: 12/09/20 " 0730     Anaerobic Culture Culture in progress    Narrative:      T9-10 disc space    Culture, Anaerobe [[id number]] Collected: 12/08/20 1139    Order Status: Completed Specimen: Back Updated: 12/09/20 0730     Anaerobic Culture Culture in progress    Narrative:      T9-10 disc space    Gram stain [[id number]] Collected: 12/08/20 1139    Order Status: Completed Specimen: Back Updated: 12/08/20 1652     Gram Stain Result Rare WBC's      No organisms seen    Narrative:      T9-10 disc space    Gram stain [[id number]] Collected: 12/08/20 1139    Order Status: Completed Specimen: Back Updated: 12/08/20 1651     Gram Stain Result Rare WBC's      No organisms seen    Narrative:      T9-10 disc space    Fungus culture [[id number]] Collected: 12/08/20 1139    Order Status: Sent Specimen: Back Updated: 12/08/20 1220    Fungus culture [[id number]] Collected: 12/08/20 1139    Order Status: Sent Specimen: Back Updated: 12/08/20 1219    Blood culture [[id number]] Collected: 12/02/20 0020    Order Status: Completed Specimen: Blood Updated: 12/07/20 0612     Blood Culture, Routine No growth after 5 days.          Significant Diagnostics:  No results found in the last 24 hours.      Assessment/Plan:     MRSA bacteremia  70 F with known MRSA bacteremia with thoracic osteomyelitis presenting as transfer from SNF after CT T spine concerning for increased erosion of T9/T10 vertebral bodies as well as bilateral T9 pedicle fractures:    Neurologically stable on exam.    - Admitted to [facility]  - q1h neuro checks  - Post op imaging pending  -Continue HV drains, abx while in place  - TLSO brace at all times  - Pain control: scheduled Tylenol and Robaxin, Oxycodone PRN  - Recommend repeat TTE as outpatient given changes from 8/10 - 10/12. Pt may have had  NSTEMI. Endocarditis felt less likely.  - H/o MRSA Bacteremia: Afebrile, no leukocytosis. ESR 97, CRP 12.4.    - ID following, appreciate assistance. Continue ceftaroline. Send OR Cx's and  pathology.  - CAD, s/p Cardiac Stent: MI in 2000 which required stent placements. Pt takes ASA and Plavix, last dose  11/30.   - Hold ASA/Plavix preoperatively  - Alzheimer's: Continue home memantine and donepezil  - HLD: Continue home pravastatin  -DVT prophylaxis: SHAYLA's, SCD's, SQH.   -Bowel regimen: senna BID     Contact Neurosurgery with any questions, concerns, or neuro changes.            Tania Gillette MD  Neurosurgery  [facility]

## 2020-12-09 NOTE — PT/OT/SLP EVAL
Physical Therapy Evaluation    Patient Name:  Martina Betancourt   MRN:  [id number]    Recommendations:     Discharge Recommendations:  rehabilitation facility   Discharge Equipment Recommendations: other (see comments)(TBD wtih further functional mobility)   Barriers to discharge: Inaccessible home and Decreased caregiver support    Assessment:     Martina Betancourt is a 70 y.o. female admitted with a medical diagnosis of Osteomyelitis of thoracic vertebra.  She presents with the following impairments/functional limitations:  weakness, impaired endurance, impaired self care skills, impaired functional mobilty, impaired balance, decreased upper extremity function, decreased lower extremity function, decreased safety awareness, pain, decreased ROM, impaired cardiopulmonary response to activity, orthopedic precautions. Eval completed with OT. Fiordaliza for rolling, performed several times to change bed linens and adjust TLSO brace. Patient refused further mobility due to pain. Per chart review patient was progressing well with inpatient rehab and appeared to be primarily pain limited on this date. This patient is an excellent candidate for inpatient rehabilitation, has a qualifying diagnosis, shows increasing activity tolerance,  is motivated to participate in treatment, and has a supportive family willing to assist the patient upon discharge.     Rehab Prognosis: Fair; patient would benefit from acute skilled PT services to address these deficits and reach maximum level of function.    Recent Surgery: Procedure(s) (LRB):  T9-T10 corpectomy, posterior instrumented fusion T7-T12. (N/A) 1 Day Post-Op    Plan:     During this hospitalization, patient to be seen 4 x/week to address the identified rehab impairments via gait training, therapeutic activities, therapeutic exercises, neuromuscular re-education and progress toward the following goals:    · Plan of Care Expires:  01/08/21    Subjective     Chief Complaint: back pain,  discomfort from TSLO brace  Patient/Family Comments/goals: to return to PLOF  Pain/Comfort:  · Pain Rating 1: 8/10  · Location - Orientation 1: generalized  · Location 1: back  · Pain Addressed 1: Pre-medicate for activity, Reposition, Cessation of Activity  · Pain Rating Post-Intervention 1: 8/10    Patients cultural, spiritual, Jainism conflicts given the current situation: no    Living Environment:  Lives with daughter in 1SH with no RISHABH. Patient was admitted from [facility].   Prior to admission, patients level of function was requiring assistance to transfer to [facility] or spending most of the day in bed. Per chart review patient was Sara for supine to sit and able to stand with use of standard walker at the end of November. [facility]  DME owned (not currently used): rolling walker.  Upon discharge, patient will have assistance from daughter.    Objective:     Communicated with nurse prior to session.  Patient found supine with arterial line, telemetry, pulse ox (continuous), blood pressure cuff, peripheral IV, hemovac  upon PT entry to room.    General Precautions: Standard, fall   Orthopedic Precautions:spinal precautions   Braces: TLSO     Exams:  · RLE ROM: demonstrates good ankle and knee ROM in supine, unable to formally assess due to pain  · RLE Strength: unable to formally assess due to pain, patient demonstrates >1/5 through AAROM in supine  · LLE ROM: demonstrates good ankle and knee ROM in supine, unable to formally assess due to pain  · LLE Strength: unable to formally assess due to pain, patient demonstrates >1/5 through AAROM in supine    Functional Mobility:  · Bed Mobility:     · Rolling Left:  total assistance and of 3 persons x 3 trials to change linens and adjust brace  · Rolling Right: total assistance and of 3 persons x 3 trials to change linens and adjust brace  · Supine to Sit: deferred 2* pain    Therapeutic Activities and Exercises:   Patient  educated on role of PT in the hospital.   Pt educated on plan and goals with physical therapy.   Pt educated on importance of OOB activity to decrease the risks associated with bed rest.   LE AROM/AAROM in supine.   Instruction for bed mobility.   PT instructed patient on log rolling technique in order to decrease trunk rotation and strain on back.   Discussed discharge planning with patient and daughter.  All questions and concerns addressed within PT scope of practice.     AM-PAC 6 CLICK MOBILITY  Total Score:7     Patient left HOB elevated with all lines intact, call button in reach and RN and daughter present.    GOALS:   Multidisciplinary Problems     Physical Therapy Goals        Problem: Physical Therapy Goal    Goal Priority Disciplines Outcome Goal Variances Interventions   Physical Therapy Goal     PT, PT/OT Ongoing, Progressing     Description: Goals to be met by: 2020     Patient will increase functional independence with mobility by performin. Supine to sit with MInimal Assistance  2. Sit to supine with MInimal Assistance  3. Rolling to Left and Right with Minimal Assistance.  4. Bed to chair transfer with Maximum Assistance using Slideboard  5. Sitting at edge of bed x8 minutes with Stand-by Assistance  6. Lower extremity exercise program x30 reps per handout, with independence                     History:     Past Medical History:   Diagnosis Date    Alzheimer disease     Alzheimer disease     Coronary artery disease     Hyperlipidemia     Hypertension     Myopathy     Non-ST elevation (NSTEMI) myocardial infarction     Obesity     Pneumonia     Sleep apnea        Past Surgical History:   Procedure Laterality Date    APPENDECTOMY      APPENDECTOMY      CHOLECYSTECTOMY      CORONARY STENT PLACEMENT      HYSTERECTOMY      SURGICAL REMOVAL OF VERTEBRAL BODY OF THORACIC SPINE N/A 2020    Procedure: T9-T10 corpectomy, posterior instrumented fusion T7-T12.;  Surgeon: Everardo  MD Fransisca;  Location: [facility] OR [address] [place] [address];  Service: Neurosurgery;  Laterality: N/A;    TONSILLECTOMY         Time Tracking:     PT Received On: 12/09/20  PT Start Time: 1457     PT Stop Time: 1530  PT Total Time (min): 33 min     Billable Minutes: Evaluation 8, Therapeutic Activity 15 and Therapeutic Exercise 10      Liss Lacey, PT  12/09/2020

## 2020-12-09 NOTE — ASSESSMENT & PLAN NOTE
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     Vancomycin discontinued and Ceftaroline started. Blood cx are negative. She is afebrile. No leukocytosis. Now s/p I&D, T9-T10 corpectomy and fusion 12/8. Cx are pending.    Plan:  1. Continue Ceftaroline  2. Restart Vancomycin  3. Follow surgical cultures. If cultures return negative, can discontinue Ceftaroline and treat with Vancomycin alone as worsening likely due to lack of source control and not antibiotic failure  4. Anticipate 6 weeks of IV antibiotics   5. ID will follow.

## 2020-12-10 LAB
ABO + RH BLD: NORMAL
ALBUMIN SERPL BCP-MCNC: 1.8 G/DL (ref 3.5–5.2)
ALP SERPL-CCNC: 88 U/L (ref 55–135)
ALT SERPL W/O P-5'-P-CCNC: <5 U/L (ref 10–44)
ANION GAP SERPL CALC-SCNC: 5 MMOL/L (ref 8–16)
AST SERPL-CCNC: 10 U/L (ref 10–40)
BASOPHILS # BLD AUTO: 0.03 K/UL (ref 0–0.2)
BASOPHILS # BLD AUTO: 0.03 K/UL (ref 0–0.2)
BASOPHILS NFR BLD: 0.3 % (ref 0–1.9)
BASOPHILS NFR BLD: 0.3 % (ref 0–1.9)
BILIRUB SERPL-MCNC: 0.2 MG/DL (ref 0.1–1)
BLD GP AB SCN CELLS X3 SERPL QL: NORMAL
BLD PROD TYP BPU: NORMAL
BLOOD UNIT EXPIRATION DATE: NORMAL
BLOOD UNIT TYPE CODE: 5100
BLOOD UNIT TYPE: NORMAL
BUN SERPL-MCNC: 9 MG/DL (ref 8–23)
CALCIUM SERPL-MCNC: 9 MG/DL (ref 8.7–10.5)
CHLORIDE SERPL-SCNC: 109 MMOL/L (ref 95–110)
CO2 SERPL-SCNC: 24 MMOL/L (ref 23–29)
CODING SYSTEM: NORMAL
CREAT SERPL-MCNC: 0.8 MG/DL (ref 0.5–1.4)
DIFFERENTIAL METHOD: ABNORMAL
DIFFERENTIAL METHOD: ABNORMAL
DISPENSE STATUS: NORMAL
EOSINOPHIL # BLD AUTO: 0 K/UL (ref 0–0.5)
EOSINOPHIL # BLD AUTO: 0 K/UL (ref 0–0.5)
EOSINOPHIL NFR BLD: 0.3 % (ref 0–8)
EOSINOPHIL NFR BLD: 0.4 % (ref 0–8)
ERYTHROCYTE [DISTWIDTH] IN BLOOD BY AUTOMATED COUNT: 17.2 % (ref 11.5–14.5)
ERYTHROCYTE [DISTWIDTH] IN BLOOD BY AUTOMATED COUNT: 17.2 % (ref 11.5–14.5)
EST. GFR  (AFRICAN AMERICAN): >60 ML/MIN/1.73 M^2
EST. GFR  (NON AFRICAN AMERICAN): >60 ML/MIN/1.73 M^2
ESTIMATED AVG GLUCOSE: 91 MG/DL (ref 68–131)
GLUCOSE SERPL-MCNC: 109 MG/DL (ref 70–110)
HBA1C MFR BLD HPLC: 4.8 % (ref 4–5.6)
HCT VFR BLD AUTO: 26.9 % (ref 37–48.5)
HCT VFR BLD AUTO: 28.2 % (ref 37–48.5)
HGB BLD-MCNC: 7.9 G/DL (ref 12–16)
HGB BLD-MCNC: 8.3 G/DL (ref 12–16)
IMM GRANULOCYTES # BLD AUTO: 0.05 K/UL (ref 0–0.04)
IMM GRANULOCYTES # BLD AUTO: 0.05 K/UL (ref 0–0.04)
IMM GRANULOCYTES NFR BLD AUTO: 0.4 % (ref 0–0.5)
IMM GRANULOCYTES NFR BLD AUTO: 0.5 % (ref 0–0.5)
LYMPHOCYTES # BLD AUTO: 1.4 K/UL (ref 1–4.8)
LYMPHOCYTES # BLD AUTO: 1.7 K/UL (ref 1–4.8)
LYMPHOCYTES NFR BLD: 13.6 % (ref 18–48)
LYMPHOCYTES NFR BLD: 15 % (ref 18–48)
MAGNESIUM SERPL-MCNC: 1.9 MG/DL (ref 1.6–2.6)
MCH RBC QN AUTO: 27.9 PG (ref 27–31)
MCH RBC QN AUTO: 27.9 PG (ref 27–31)
MCHC RBC AUTO-ENTMCNC: 29.4 G/DL (ref 32–36)
MCHC RBC AUTO-ENTMCNC: 29.4 G/DL (ref 32–36)
MCV RBC AUTO: 95 FL (ref 82–98)
MCV RBC AUTO: 95 FL (ref 82–98)
MONOCYTES # BLD AUTO: 1.6 K/UL (ref 0.3–1)
MONOCYTES # BLD AUTO: 2 K/UL (ref 0.3–1)
MONOCYTES NFR BLD: 15.9 % (ref 4–15)
MONOCYTES NFR BLD: 17.9 % (ref 4–15)
NEUTROPHILS # BLD AUTO: 7.2 K/UL (ref 1.8–7.7)
NEUTROPHILS # BLD AUTO: 7.4 K/UL (ref 1.8–7.7)
NEUTROPHILS NFR BLD: 66 % (ref 38–73)
NEUTROPHILS NFR BLD: 69.4 % (ref 38–73)
NRBC BLD-RTO: 0 /100 WBC
NRBC BLD-RTO: 0 /100 WBC
PHOSPHATE SERPL-MCNC: 2.2 MG/DL (ref 2.7–4.5)
PLATELET # BLD AUTO: 213 K/UL (ref 150–350)
PLATELET # BLD AUTO: 222 K/UL (ref 150–350)
PMV BLD AUTO: 10.1 FL (ref 9.2–12.9)
PMV BLD AUTO: 10.6 FL (ref 9.2–12.9)
POTASSIUM SERPL-SCNC: 3.9 MMOL/L (ref 3.5–5.1)
PROT SERPL-MCNC: 4.8 G/DL (ref 6–8.4)
RBC # BLD AUTO: 2.83 M/UL (ref 4–5.4)
RBC # BLD AUTO: 2.98 M/UL (ref 4–5.4)
SODIUM SERPL-SCNC: 138 MMOL/L (ref 136–145)
TRANS ERYTHROCYTES VOL PATIENT: NORMAL ML
WBC # BLD AUTO: 10.31 K/UL (ref 3.9–12.7)
WBC # BLD AUTO: 11.16 K/UL (ref 3.9–12.7)

## 2020-12-10 PROCEDURE — 25000003 PHARM REV CODE 250: Performed by: PHYSICIAN ASSISTANT

## 2020-12-10 PROCEDURE — 83735 ASSAY OF MAGNESIUM: CPT

## 2020-12-10 PROCEDURE — 86920 COMPATIBILITY TEST SPIN: CPT

## 2020-12-10 PROCEDURE — 97112 NEUROMUSCULAR REEDUCATION: CPT

## 2020-12-10 PROCEDURE — 63600175 PHARM REV CODE 636 W HCPCS: Performed by: PHYSICIAN ASSISTANT

## 2020-12-10 PROCEDURE — 97530 THERAPEUTIC ACTIVITIES: CPT | Mod: CQ

## 2020-12-10 PROCEDURE — 25000003 PHARM REV CODE 250: Performed by: STUDENT IN AN ORGANIZED HEALTH CARE EDUCATION/TRAINING PROGRAM

## 2020-12-10 PROCEDURE — P9021 RED BLOOD CELLS UNIT: HCPCS

## 2020-12-10 PROCEDURE — 11000001 HC ACUTE MED/SURG PRIVATE ROOM

## 2020-12-10 PROCEDURE — 25000003 PHARM REV CODE 250: Performed by: NURSE PRACTITIONER

## 2020-12-10 PROCEDURE — 25000003 PHARM REV CODE 250: Performed by: NEUROLOGICAL SURGERY

## 2020-12-10 PROCEDURE — 86850 RBC ANTIBODY SCREEN: CPT

## 2020-12-10 PROCEDURE — 63600175 PHARM REV CODE 636 W HCPCS: Mod: JG | Performed by: PHYSICIAN ASSISTANT

## 2020-12-10 PROCEDURE — 83036 HEMOGLOBIN GLYCOSYLATED A1C: CPT

## 2020-12-10 PROCEDURE — 27000221 HC OXYGEN, UP TO 24 HOURS

## 2020-12-10 PROCEDURE — 99233 PR SUBSEQUENT HOSPITAL CARE,LEVL III: ICD-10-PCS | Mod: ,,, | Performed by: NURSE PRACTITIONER

## 2020-12-10 PROCEDURE — 99233 SBSQ HOSP IP/OBS HIGH 50: CPT | Mod: ,,, | Performed by: NURSE PRACTITIONER

## 2020-12-10 PROCEDURE — 97535 SELF CARE MNGMENT TRAINING: CPT

## 2020-12-10 PROCEDURE — 94761 N-INVAS EAR/PLS OXIMETRY MLT: CPT

## 2020-12-10 PROCEDURE — 63600175 PHARM REV CODE 636 W HCPCS: Performed by: NEUROLOGICAL SURGERY

## 2020-12-10 PROCEDURE — 36430 TRANSFUSION BLD/BLD COMPNT: CPT

## 2020-12-10 PROCEDURE — 85025 COMPLETE CBC W/AUTO DIFF WBC: CPT | Mod: 91

## 2020-12-10 PROCEDURE — 84100 ASSAY OF PHOSPHORUS: CPT

## 2020-12-10 PROCEDURE — 80053 COMPREHEN METABOLIC PANEL: CPT

## 2020-12-10 RX ORDER — HYDROCODONE BITARTRATE AND ACETAMINOPHEN 500; 5 MG/1; MG/1
TABLET ORAL
Status: DISCONTINUED | OUTPATIENT
Start: 2020-12-10 | End: 2020-12-18 | Stop reason: HOSPADM

## 2020-12-10 RX ORDER — SODIUM,POTASSIUM PHOSPHATES 280-250MG
2 POWDER IN PACKET (EA) ORAL
Status: DISCONTINUED | OUTPATIENT
Start: 2020-12-10 | End: 2020-12-11

## 2020-12-10 RX ORDER — LANOLIN ALCOHOL/MO/W.PET/CERES
800 CREAM (GRAM) TOPICAL
Status: DISCONTINUED | OUTPATIENT
Start: 2020-12-10 | End: 2020-12-11

## 2020-12-10 RX ADMIN — METHOCARBAMOL 500 MG: 500 TABLET ORAL at 08:12

## 2020-12-10 RX ADMIN — CEFTAROLINE FOSAMIL 600 MG: 600 POWDER, FOR SOLUTION INTRAVENOUS at 01:12

## 2020-12-10 RX ADMIN — OXYCODONE HYDROCHLORIDE 10 MG: 10 TABLET ORAL at 07:12

## 2020-12-10 RX ADMIN — SENNOSIDES AND DOCUSATE SODIUM 1 TABLET: 8.6; 5 TABLET ORAL at 08:12

## 2020-12-10 RX ADMIN — DONEPEZIL HYDROCHLORIDE 10 MG: 10 TABLET ORAL at 08:12

## 2020-12-10 RX ADMIN — DULOXETINE HYDROCHLORIDE 60 MG: 60 CAPSULE, DELAYED RELEASE ORAL at 08:12

## 2020-12-10 RX ADMIN — METHOCARBAMOL 500 MG: 500 TABLET ORAL at 05:12

## 2020-12-10 RX ADMIN — PRAVASTATIN SODIUM 40 MG: 40 TABLET ORAL at 08:12

## 2020-12-10 RX ADMIN — METOPROLOL TARTRATE 25 MG: 25 TABLET, FILM COATED ORAL at 08:12

## 2020-12-10 RX ADMIN — CEFTAROLINE FOSAMIL 600 MG: 600 POWDER, FOR SOLUTION INTRAVENOUS at 05:12

## 2020-12-10 RX ADMIN — HEPARIN SODIUM 5000 UNITS: 5000 INJECTION INTRAVENOUS; SUBCUTANEOUS at 02:12

## 2020-12-10 RX ADMIN — METHOCARBAMOL 500 MG: 500 TABLET ORAL at 01:12

## 2020-12-10 RX ADMIN — VANCOMYCIN HYDROCHLORIDE 1250 MG: 1.25 INJECTION, POWDER, LYOPHILIZED, FOR SOLUTION INTRAVENOUS at 03:12

## 2020-12-10 RX ADMIN — PANTOPRAZOLE SODIUM 40 MG: 40 TABLET, DELAYED RELEASE ORAL at 05:12

## 2020-12-10 RX ADMIN — MUPIROCIN: 20 OINTMENT TOPICAL at 08:12

## 2020-12-10 RX ADMIN — CEFTAROLINE FOSAMIL 600 MG: 600 POWDER, FOR SOLUTION INTRAVENOUS at 09:12

## 2020-12-10 RX ADMIN — HEPARIN SODIUM 5000 UNITS: 5000 INJECTION INTRAVENOUS; SUBCUTANEOUS at 09:12

## 2020-12-10 RX ADMIN — SODIUM CHLORIDE 50 ML/HR: 0.9 INJECTION, SOLUTION INTRAVENOUS at 09:12

## 2020-12-10 RX ADMIN — OXYCODONE HYDROCHLORIDE 10 MG: 10 TABLET ORAL at 05:12

## 2020-12-10 RX ADMIN — MEMANTINE HYDROCHLORIDE 10 MG: 10 TABLET ORAL at 08:12

## 2020-12-10 RX ADMIN — HEPARIN SODIUM 5000 UNITS: 5000 INJECTION INTRAVENOUS; SUBCUTANEOUS at 05:12

## 2020-12-10 NOTE — PLAN OF CARE
[facility] Care Plan    POC reviewed with Martina Betancourt at 0300. Pt verbalized understanding. Questions and concerns addressed. Post op imaging pending. 500mL bolus given x2 for SBPs in the 90s. Pt on 4L NC with humidification. Oxy 10mg given x1 once pressures were stabilized. PM Lopressor held due to low SBPs and bradycardia.  Will continue to monitor. See below and flowsheets for full assessment and VS info.       Neuro:  Straughn Coma Scale  Best Eye Response: 4-->(E4) spontaneous  Best Motor Response: 6-->(M6) obeys commands  Best Verbal Response: 5-->(V5) oriented  Straughn Coma Scale Score: 15  Assessment Qualifiers: patient not sedated/intubated  Pupil PERRLA: yes     24hr Temp:  [97.9 °F (36.6 °C)-98.7 °F (37.1 °C)]     CV:   Rhythm: normal sinus rhythm  BP goals:   SBP < 180  MAP > 65    Resp:   O2 Device (Oxygen Therapy): nasal cannula w/ humidification(humidification added)  Oxygen Concentration (%): 24    Plan: N/A    GI/:     Diet/Nutrition Received: regular  Last Bowel Movement: 12/08/20  Voiding Characteristics: urethral catheter (bladder)    Intake/Output Summary (Last 24 hours) at 12/10/2020 0607  Last data filed at 12/10/2020 0405  Gross per 24 hour   Intake 3126.66 ml   Output 890 ml   Net 2236.66 ml     Unmeasured Output  Urine Occurrence: 1  Stool Occurrence: 0  Emesis Occurrence: 0  Pad Count: 2    Labs/Accuchecks:  Recent Labs   Lab 12/10/20  0302   WBC 10.31   RBC 2.83*   HGB 7.9*   HCT 26.9*         Recent Labs   Lab 12/10/20  0302      K 3.9   CO2 24      BUN 9   CREATININE 0.8   ALKPHOS 88   ALT <5*   AST 10   BILITOT 0.2      Recent Labs   Lab 12/07/20  0834   INR 1.0   APTT 28.4    No results for input(s): CPK, CPKMB, TROPONINI, MB in the last 168 hours.    Electrolytes: Electrolytes replaced  Accuchecks: none    Gtts:   sodium chloride 0.9% 50 mL/hr at 12/10/20 0405       LDA/Wounds:  Lines/Drains/Airways       Peripherally Inserted Central Catheter Line               PICC Double Lumen 10/19/20 1114 right brachial 51 days              Drain                   Closed/Suction Drain 12/08/20 1239 Left Back Accordion 10 Fr. 1 day         Closed/Suction Drain 12/08/20 1239 Right Back Accordion 10 Fr. 1 day    Female External Urinary Catheter 12/09/20 1741 less than 1 day                  Wounds: Yes  Wound care consulted: Yes

## 2020-12-10 NOTE — PROGRESS NOTES
[facility]  Neurosurgery  Progress Note    Subjective:     History of Present Illness: 70 y.o. female known to [facility] with PMH of Alzheimer's dz, HTN, HLD, CAD, YOVANI, and morbid obesity who was recently hospitalized x2 for MRSA bacteremia complicated by pneumonia and possible UTI, treated with linozolid x8 days and cipro and discharged 10/1. [place] then [place] [facility] [place] with severe back pain. MRI revealed T9-10 osteodiscitis and T8-10 epidural phlegmon with associated paraverterbral abscesses. Spine infection likely hematogenous from under-treated bacteremia. Patient had no neurologic deficits on exam, no indications for emergent neurosurgical intervention, plan was made for conservative non-surgical treatment. She underwent needle aspiration of T9-10 disc space on 10/16, cultures were negative although she had already been undergoing antibiotic treatment. Repeat BCx remained no growth and she remained afebrile and neurologically stable. Patient was discharged to [facility] on 10/20 with plan for Vancomycin IV x 8 weeks (estimated end date 12/3).    Patient presents today after interval imaging obtained 11/30 revealed worsening bony destruction, unstable T9 fracture. Transferred for [facility].     Post-Op Info:  Procedure(s) (LRB):  T9-T10 corpectomy, posterior instrumented fusion T7-T12. (N/A)   2 Days Post-Op     Interval History: NAEON. HV with 50, 115cc. TLSO in place. Pain control improving.     Medications:  Continuous Infusions:   sodium chloride 0.9% 50 mL/hr (12/10/20 0901)     Scheduled Meds:   bisacodyL  10 mg Rectal Daily    ceftaroline fosamil  600 mg Intravenous Q8H    donepeziL  10 mg Oral QHS    DULoxetine  60 mg Oral Daily    heparin (porcine)  5,000 Units Subcutaneous Q8H    memantine  10 mg Oral BID    methocarbamoL  500 mg Oral QID    mupirocin   Nasal BID    pantoprazole  40 mg Oral Before breakfast    pravastatin  40 mg Oral QHS    senna-docusate  8.6-50 mg  1 tablet Oral BID    vancomycin (VANCOCIN) IVPB  1,250 mg Intravenous Q24H     PRN Meds:acetaminophen, albuterol-ipratropium, aluminum-magnesium hydroxide-simethicone, dextrose 50%, dextrose 50%, glucagon (human recombinant), glucose, glucose, glucose, insulin aspart U-100, labetaloL, magnesium oxide, magnesium oxide, morphine, ondansetron, oxyCODONE, oxyCODONE, potassium bicarbonate, potassium bicarbonate, potassium bicarbonate, potassium, sodium phosphates, potassium, sodium phosphates, potassium, sodium phosphates, promethazine (PHENERGAN) IVPB, senna-docusate 8.6-50 mg, Pharmacy to dose Vancomycin consult **AND** vancomycin - pharmacy to dose     Review of Systems  Objective:     Weight: 125.4 kg (276 lb 7.3 oz)  Body mass index is 39.67 kg/m².  Vital Signs (Most Recent):  Temp: 98.8 °F (37.1 °C) (12/10/20 0700)  Pulse: 64 (12/10/20 0900)  Resp: (!) 21 (12/10/20 0900)  BP: 138/64 (12/10/20 0900)  SpO2: 99 % (12/10/20 0900) Vital Signs (24h Range):  Temp:  [98.2 °F (36.8 °C)-98.8 °F (37.1 °C)] 98.8 °F (37.1 °C)  Pulse:  [57-66] 64  Resp:  [10-35] 21  SpO2:  [89 %-100 %] 99 %  BP: ()/(40-65) 138/64  Arterial Line BP: ()/() 42/30     Date 12/10/20 0700 - 12/11/20 0659   Shift 8794-0190 9789-4481 0938-6412 24 Hour Total   INTAKE   I.V.(mL/kg) 146.8(1.2)   146.8(1.2)   Shift Total(mL/kg) 146.8(1.2)   146.8(1.2)   OUTPUT   Shift Total(mL/kg)       Weight (kg) 125.4 125.4 125.4 125.4                        Closed/Suction Drain 12/08/20 1239 Right Back Accordion 10 Fr. (Active)   Site Description Unable to view 12/10/20 0700   Dressing Type Biopatch in place 12/10/20 0700   Dressing Status Clean;Dry;Intact 12/10/20 0700   Dressing Intervention Integrity maintained 12/10/20 0700   Drainage Serosanguineous 12/10/20 0700   Status To bulb suction 12/10/20 0700   Output (mL) 35 mL 12/10/20 0605            Closed/Suction Drain 12/08/20 1239 Left Back Accordion 10 Fr. (Active)   Site Description  Unable to view 12/10/20 0700   Dressing Type Biopatch in place 12/10/20 0700   Dressing Status Clean;Dry;Intact 12/10/20 0700   Dressing Intervention Integrity maintained 12/10/20 0700   Drainage Serosanguineous 12/10/20 0700   Status To bulb suction 12/10/20 0700   Output (mL) 50 mL 12/10/20 0605       Female External Urinary Catheter 12/09/20 1741 (Active)   Skin no redness;no breakdown 12/10/20 0700   Tolerance no signs/symptoms of discomfort 12/10/20 0700   Suction Continuous suction at 40 mmHg 12/10/20 0700   Date of last wick change 12/10/20 12/10/20 0700   Output (mL) 700 mL 12/10/20 0505       Neurosurgery Physical Exam   Awake, alert, NAD  Brace in place  EOMI, PERRL  FS in BUE  Pain limited proximal leg weakness 3+/5, L>R  4/5 at knee bilaterally  5/5 distally  SILT    Significant Labs:  Recent Labs   Lab 12/09/20  0406 12/09/20  1357 12/10/20  0302   * 92 109   * 136 138   K 3.9 4.1 3.9    105 109   CO2 25 26 24   BUN 10 9 9   CREATININE 0.9 0.8 0.8   CALCIUM 9.4 9.5 9.0   MG 1.7  --  1.9     Recent Labs   Lab 12/08/20  1216 12/09/20  0406 12/10/20  0302   WBC  --  11.37 10.31   HGB  --  8.9* 7.9*   HCT 30* 29.0* 26.9*   PLT  --  258 213     No results for input(s): LABPT, INR, APTT in the last 48 hours.  Microbiology Results (last 7 days)     Procedure Component Value Units Date/Time    Aerobic culture [[id number]] Collected: 12/08/20 1139    Order Status: Completed Specimen: Back Updated: 12/09/20 0912     Aerobic Bacterial Culture No growth    Narrative:      T9-10 disc space    Aerobic culture [[id number]] Collected: 12/08/20 1139    Order Status: Completed Specimen: Back Updated: 12/09/20 0912     Aerobic Bacterial Culture No growth    Narrative:      T9-10 disc space    Culture, Anaerobe [[id number]] Collected: 12/08/20 1139    Order Status: Completed Specimen: Back Updated: 12/09/20 0758     Anaerobic Culture Culture in progress    Narrative:      T9-10 disc space    Culture,  Anaerobe [[id number]] Collected: 12/08/20 1139    Order Status: Completed Specimen: Back Updated: 12/09/20 0730     Anaerobic Culture Culture in progress    Narrative:      T9-10 disc space    Gram stain [[id number]] Collected: 12/08/20 1139    Order Status: Completed Specimen: Back Updated: 12/08/20 1652     Gram Stain Result Rare WBC's      No organisms seen    Narrative:      T9-10 disc space    Gram stain [[id number]] Collected: 12/08/20 1139    Order Status: Completed Specimen: Back Updated: 12/08/20 1651     Gram Stain Result Rare WBC's      No organisms seen    Narrative:      T9-10 disc space    Fungus culture [[id number]] Collected: 12/08/20 1139    Order Status: Sent Specimen: Back Updated: 12/08/20 1220    Fungus culture [[id number]] Collected: 12/08/20 1139    Order Status: Sent Specimen: Back Updated: 12/08/20 1219    Blood culture [[id number]] Collected: 12/02/20 0020    Order Status: Completed Specimen: Blood Updated: 12/07/20 0612     Blood Culture, Routine No growth after 5 days.          Significant Diagnostics:  No results found in the last 24 hours.      Assessment/Plan:     MRSA bacteremia  70 F with known MRSA bacteremia with thoracic osteomyelitis presenting as transfer from SNF after CT T spine concerning for increased erosion of T9/T10 vertebral bodies as well as bilateral T9 pedicle fractures:    Neurologically stable on exam.    - Admitted to [facility]  - q1h neuro checks  - Post op imaging pending  - Continue HV drains, abx while in place  - TLSO brace at all times  - Pending post op XR  - Pain control: scheduled Tylenol and Robaxin, Oxycodone PRN  - Recommend repeat TTE as outpatient given changes from 8/10 - 10/12. Pt may have had NSTEMI. Endocarditis felt less likely.  - H/o MRSA Bacteremia: Afebrile, no leukocytosis. ESR 97, CRP 12.4.    - ID following, appreciate assistance. Continue ceftaroline. Send OR Cx's and pathology.  - CAD, s/p Cardiac Stent: MI in 2000 which required stent placements.  Pt takes ASA and Plavix, last dose  11/30.   - Hold ASA/Plavix preoperatively  - Alzheimer's: Continue home memantine and donepezil  - HLD: Continue home pravastatin  -DVT prophylaxis: SHAYLA's, SCD's, SQH.   -Bowel regimen: senna BID     Contact Neurosurgery with any questions, concerns, or neuro changes.            Tania Gillette MD  Neurosurgery  [facility]

## 2020-12-10 NOTE — PROGRESS NOTES
[facility]  Neurocritical Care  Progress Note    Admit Date: 12/1/2020  Service Date: 12/10/2020  Length of Stay: 9    Subjective:     Chief Complaint: Osteomyelitis of thoracic vertebra    History of Present Illness:    70 y.o. female known to [facility] [place] with PMH of Alzheimer's dz, HTN, HLD, CAD, YOVANI, and morbid obesity who was recently hospitalized x2 for MRSA bacteremia complicated by pneumonia and possible UTI, treated with linozolid x8 days and cipro and discharged 10/1. She was then read[place] to [facility] [place]8 with severe back pain. MRI revealed T9-10 osteodiscitis and T8-10 epidural phlegmon with associated paraverterbral abscesses. Spine infection likely hematogenous from under-treated bacteremia. Patient had no neurologic deficits on exam, no indications for emergent neurosurgical intervention, plan was made for conservative non-surgical treatment. She underwent needle aspiration of T9-10 disc space on 10/16, cultures were negative although she had already been undergoing antibiotic treatment. Repeat BCx remained no growth and she remained afebrile and neurologically stable. Patient was discharged to [facility] on 10/20 with plan for Vancomycin IV x 8 weeks (estimated end date 12/3).       Patient presents 12/1 after interval imaging obtained 11/30 revealed worsening bony destruction, unstable T9 fracture. Transferred to [facility] for [place]GY eval.        12/8/2020 will admit to [facility] S/P  T9-10 corpectomy; T7-12 posterior fusion POD#0. Wound vac to incision for 5days and hemovac drains x2 to full suction.        Hospital Course: 12/8/2020 admit to [facility]. S/P T9-10 corpectomy; T7-12 posterior fusion POD#0, 2 hemovac drains to full suction and wound vac to incision for 5 days. Thoracic spine AP xray post-op, pending.  12/9: [facility]UCAS BAUTISTA. Patient 1 day S/P T9-10 corpectomy; T7-12 posterior fusion. Pain well controlled. Patient A&Ox3 and Neuro exam stable. Patient reports BM yesterday. Sodium level  131 in the AM.  12/10/2020: stepdown to NSGY, d/c Lopressor, continue abx for MRSA bacteremia, add regular diet, repeat CBC stable, replace lytes PRN        Review of Systems    Constitutional: Denies fevers, weight loss, chills, or weakness.  Eyes: Denies changes in vision.  ENT: Denies dysphagia, nasal discharge, ear pain or discharge.  Cardiovascular: Denies chest pain, palpitations, orthopnea, or claudication.  Respiratory: Denies shortness of breath, cough, hemoptysis, or wheezing.  GI: Denies nausea/vomitting, hematochezia, melena, abd pain, or changes in appetite.  : Denies dysuria, incontinence, or hematuria.  Musculoskeletal: Denies joint pain or myalgias.  Skin/breast: Denies rashes, lumps, lesions, or discharge.  Neurologic: Denies headache, dizziness, vertigo, or paresthesias.  Psychiatric: Denies changes in mood or hallucinations.  Endocrine: Denies polyuria, polydipsia, heat/cold intolerance.  Hematologic/Lymph: Denies lymphadenopathy, easy bruising or easy bleeding.  Allergic/Immunologic: Denies rash, rhinitis.   Objective:     Vitals:  Temp: 98.3 °F (36.8 °C)  Pulse: (!) 55  Rhythm: normal sinus rhythm  BP: (!) 120/59  MAP (mmHg): 85  Resp: 20  SpO2: 98 %  O2 Device (Oxygen Therapy): nasal cannula    Temp  Min: 98.3 °F (36.8 °C)  Max: 98.8 °F (37.1 °C)  Pulse  Min: 55  Max: 66  BP  Min: 85/43  Max: 138/64  MAP (mmHg)  Min: 56  Max: 92  Resp  Min: 10  Max: 35  SpO2  Min: 89 %  Max: 100 %    12/09 0701 - 12/10 0700  In: 3726.7 [P.O.:950; I.V.:1626.7]  Out: 1675 [Urine:1510; Drains:165]   Unmeasured Output  Urine Occurrence: 1  Stool Occurrence: 0  Emesis Occurrence: 0  Pad Count: 2       Physical Exam  GA:comfortable, no acute distress.   HEENT: No scleral icterus or JVD.   Pulmonary: Clear to auscultation A/L.  Cardiac: RRR S1 & S2 w/o rubs/murmurs/gallops.   Abdominal: Bowel sounds present x 4. No appreciable hepatosplenomegaly.  Skin: No jaundice, rashes, or visible lesions.  Neuro:  --GCS: E4 V5  M6  --Mental Status:  Awake, oriented X4, follows commands, fluent speech  --CN II-XII grossly intact.   --Pupils 3->2mm, PERRL.   --Corneal reflex, gag, cough intact.  --RICHTER spont    Medications:  Continuoussodium chloride 0.9%, Last Rate: 50 mL/hr at 12/10/20 1200    ScheduledbisacodyL, 10 mg, Daily  ceftaroline fosamil, 600 mg, Q8H  donepeziL, 10 mg, QHS  DULoxetine, 60 mg, Daily  heparin (porcine), 5,000 Units, Q8H  memantine, 10 mg, BID  methocarbamoL, 500 mg, QID  mupirocin, , BID  pantoprazole, 40 mg, Before breakfast  pravastatin, 40 mg, QHS  senna-docusate 8.6-50 mg, 1 tablet, BID  vancomycin (VANCOCIN) IVPB, 1,250 mg, Q24H    PRNsodium chloride, , Q24H PRN  acetaminophen, 650 mg, Q6H PRN  albuterol-ipratropium, 3 mL, Q6H PRN  aluminum-magnesium hydroxide-simethicone, 30 mL, Q4H PRN  dextrose 50%, 12.5 g, PRN  dextrose 50%, 25 g, PRN  glucagon (human recombinant), 1 mg, PRN  glucose, 16 g, PRN  glucose, 16 g, PRN  glucose, 24 g, PRN  insulin aspart U-100, 0-5 Units, QID (AC + HS) PRN  labetaloL, 10 mg, Q4H PRN  magnesium oxide, 800 mg, PRN  magnesium oxide, 800 mg, PRN  morphine, 2 mg, Q1H PRN  ondansetron, 4 mg, Q8H PRN  oxyCODONE, 5 mg, Q6H PRN  oxyCODONE, 10 mg, Q6H PRN  potassium bicarbonate, 10 mEq, PRN  potassium bicarbonate, 50 mEq, PRN  potassium bicarbonate, 50 mEq, PRN  potassium, sodium phosphates, 2 packet, PRN  potassium, sodium phosphates, 2 packet, PRN  potassium, sodium phosphates, 2 packet, PRN  promethazine (PHENERGAN) IVPB, 6.25 mg, Q6H PRN  senna-docusate 8.6-50 mg, 2 tablet, Nightly PRN  vancomycin - pharmacy to dose, , pharmacy to manage frequency      Today I personally reviewed pertinent medications, lines/drains/airways, imaging, cardiology results, laboratory results, microbiology results,     Diet  Diet Adult Regular (IDDSI Level 7)      Assessment/Plan:     Neuro  Thoracic spine fracture  See above    Dementia without behavioral disturbance  Continue donepezil 10mg qhs  memantine  "10mg bid    Cardiac/Vascular  HTN (hypertension)  SBP goal 100-160  12/10/2020: d/c metoprolol     Renal/  Hyponatremia  Continue 0.9% NaCl infusion  Sodium level 131 in AM  Repeat BMP  Repeat Serum Na  12/10/2020: Na today 138, resolved    ID  * Osteomyelitis of thoracic vertebra   69y/o F with hx of thoracic spine fracture; 10/8 with severe back pain. MRI revealed T9-10 osteodiscitis and T8-10 epidural phlegmon with associated paraverterbral abscesses. Spine infection likely hematogenous from under-treated bacteremia.    S/P T9-10 corpectomy; T7-12 posterior fusion POD#0 with 2 hemovac drains to full suction  Wound vac intact to incision for 5 days  NSGY following  ID following  TLSO brace in place when up  -160, MAP > 65  Tylenol 1g q8h  ceftaroline 600mg q8h  Start Vanc 2 g per ID  methacarbmol 500mg qid  mupiricin oint. To nares bid  Pain regimen oxycodone 5mg q6h prn moderate pain 10mg for severe pain  Morphine 2mg q1h severe pain not relieved by oral meds  PT/OT/SLP when appropriate  12/10/2020: stepdown to NSGY team  Continue abx per ID         Discitis of thoracic region  POA   see osteomyelitis thoracic vertebra  ID following  On ABX    MRSA bacteremia  POA  ID following   was on IV vancomycin for 6-8 weeks per ID ( estimated end date 12/3)  Continue ceftaroline 600mg q8h  Start Vanc 2 mg per ID  Bone/tissue sent from OR for pathology, gram stain, aerobic/anaerobic, AFB and fungal cultures.  anticipate 6 weeks ABX post op   12/10/2020: stepdown to NSGY team    Endocrine  Severe obesity (BMI >= 40)  Estimated body mass index is 39.67 kg/m² as calculated from the following:    Height as of this encounter: 5' 10" (1.778 m).    Weight as of this encounter: 125.4 kg (276 lb 7.3 oz).    Other  Debility  PT/OT following    Obstructive sleep apnea treated with continuous positive airway pressure (CPAP)  CPAP prn          The patient is being Prophylaxed for:  Venous Thromboembolism with: " Mechanical  Stress Ulcer with: PPI  Ventilator Pneumonia with: not applicable    Activity Orders          Diet Adult Regular (IDDSI Level 7): Regular starting at 12/10 1035    Straight Cath starting at 12/04 1230        Full Code    Malina Winslow NP  Neurocritical Care  [facility]

## 2020-12-10 NOTE — PT/OT/SLP PROGRESS
Occupational Therapy Treatment    Patient Name:  Martina Betancourt   MRN:  [id number]  Admit Date: 12/1/2020  Admitting Diagnosis:  Osteomyelitis of thoracic vertebra   Length of Stay: 9 days  Recent Surgery: Procedure(s) (LRB):  T9-T10 corpectomy, posterior instrumented fusion T7-T12. (N/A) 2 Days Post-Op    Recommendations:     Discharge Recommendations: rehabilitation facility  Discharge Equipment Recommendations:  other (see comments)(TBD)  Barriers to discharge:  Inaccessible home environment, Decreased caregiver support    Plan:     Patient to be seen 4 x/week to address the above listed problems via self-care/home management, therapeutic activities, therapeutic exercises, neuromuscular re-education  · Plan of Care Expires: 01/09/20  · Plan of Care Reviewed with: patient    Assessment:   Matrina Betancourt is a 70 y.o. female with a medical diagnosis of Osteomyelitis of thoracic vertebra.  She presents with the following performance deficits affecting function: weakness, impaired endurance, impaired sensation, impaired self care skills, impaired functional mobilty, gait instability, impaired balance, impaired cognition, decreased coordination, decreased upper extremity function, decreased lower extremity function, decreased safety awareness, pain, impaired coordination, impaired fine motor, impaired cardiopulmonary response to activity, orthopedic precautions, decreased ROM, impaired skin.  Pt continues to benefit from a collaborative PT/OT/SLP program to improve quality of life and focus on recovery of impairments.     Pt continues to require significant assist with functional mobility and safe completion of ADLs Total A x3 for bed mobility and supine<>sit. Pt with noted improvement with Min-CGA, limited by pain and decreased activity tolerance. Patient is making progress towards goals, participating well in this session.     Rehab Prognosis: Fair; patient would benefit from acute skilled OT services to address  "these deficits and reach maximum level of function.        Subjective   Communicated with: RN prior to session.  Patient found HOB elevated with bed alarm, blood pressure cuff, peripheral IV, pulse ox (continuous), SCD, telemetry, hemovac, PureWick, wound vac upon OT entry to room.    Patient: "it feels like there's a pin in my back" pt stated describing back pain.     Pain/Comfort:  · Pain Rating 1: 8/10  · Location - Side 1: Right  · Location - Orientation 1: generalized  · Location 1: back  · Pain Addressed 1: Pre-medicate for activity, Reposition, Distraction, Cessation of Activity, Nurse notified  · Pain Rating Post-Intervention 1: other (see comments)(pt resting comfortably upon OT exit)    Objective:   Patient found with: bed alarm, blood pressure cuff, peripheral IV, pulse ox (continuous), SCD, telemetry, hemovac, PureWick, wound vac   General Precautions: Standard, Cardiac fall   Orthopedic Precautions:spinal precautions   Braces: TLSO   Oxygen Device: Nasal Cannula 4L  Vitals: BP (!) 120/59   Pulse (!) 55   Temp 98.3 °F (36.8 °C) (Oral)   Resp 20   Ht 5' 10" (1.778 m)   Wt 125.4 kg (276 lb 7.3 oz)   SpO2 98%   Breastfeeding No   BMI 39.67 kg/m²     Outcome Measures:  AMPAC 6 Click ADL: 9    Cognition:   · Oriented X 3 and waxing/waning with orientation 2* baseline dementia  · Command following: easily distracted by pain and fatigue and follows one-step commands  · Communication: clear/fluent    Occupational Performance:  Bed Mobility:    · Patient completed Rolling/Turning to Left with  total assistance and 3 persons  · Patient completed Rolling/Turning to Right with total assistance and 3 persons  · Scooting to HOB in supine: total assistance and 3 persons  · Patient completed Supine to Sit with total assistance and 2 persons on L side of bed  · Scooting anteriorly to EOB to have both feet planted on floor: total assistance  · Patient completed Sit to Supine with total assistance and 3 persons on " L side of bed    Functional Mobility/Transfers:   Static Sitting EOB: Min A initially, transitioned to CGA   Dynamic Sitting EOB: CGA, limited EOB tolerance 2* pain, pt with BM incontinence   Further mobility deferred 2* impaired truncal control 2* pain and decreased endurance and impaired arousal as patient fatigued sitting EOB with staff assistance.     Activities of Daily Living:  · Feeding:  contact guard assistance self-feeding with RUE  · Grooming: contact guard assistance washing face seated EOB, limited tolerance 2* pain  · Upper Body Dressing: total assistance donning TLSO  · Lower Body Dressing: total assistance donning socks, pt unable to assist  · Toileting: total assistance and of 2 persons bed level sandee hygeine. Patient with BM and urinary incontinence during EOB activity.     AMPAC 6 Click ADL:  AMPAC Total Score: 9    Treatment & Education:  -Pt education on OT role and POC   -Importance of E/OOB activity with staff assistance, emphasis on daily participation  -Multiple self-care tasks and functional mobility completed -- assistance level noted above  -Safety during functional transfer and mobility ensured  -Education provided reviewed, questions answered within OT scope of practice.   -Education provided regarding fit and wear schedule of TLSO, adjustments provided for comfort. Continued reinforcement required.   -Education provided regarding spinal precautions for use during functional tasks/mobility/transfers      Patient left HOB elevated with all lines intact, call button in reach, bed alarm on, RN notified and   present    GOALS:   Multidisciplinary Problems     Occupational Therapy Goals        Problem: Occupational Therapy Goal    Goal Priority Disciplines Outcome Interventions   Occupational Therapy Goal     OT, PT/OT Ongoing, Progressing    Description: Goals to be met by: 12/23/20     Patient will increase functional independence with ADLs by performing:    Grooming while seated with  Minimal Assistance.  Sitting at edge of bed x10 minutes with Minimal Assistance.  Rolling to Bilateral with Moderate Assistance.   Supine to sit with Moderate Assistance in preparation for EOB/OOB functional activities.                     Time Tracking:     OT Date of Treatment: 12/10/20  OT Start Time: 1128  OT Stop Time: 1158  OT Total Time (min): 30 min  Additional staff present: PT  And Rehab Tech  Co-treatment performed due to patient's multiple medical comorbidities and functional deficits requiring two skilled therapists to appropriately and safely assess patient's strength and endurance while facilitating functional tasks in addition to accommodating for patient's activity tolerance and pain levels.      Billable Minutes:Self Care/Home Management 15  Neuromuscular Re-education 15      ELE Monroe  12/10/2020

## 2020-12-10 NOTE — SUBJECTIVE & OBJECTIVE
Review of Systems    Constitutional: Denies fevers, weight loss, chills, or weakness.  Eyes: Denies changes in vision.  ENT: Denies dysphagia, nasal discharge, ear pain or discharge.  Cardiovascular: Denies chest pain, palpitations, orthopnea, or claudication.  Respiratory: Denies shortness of breath, cough, hemoptysis, or wheezing.  GI: Denies nausea/vomitting, hematochezia, melena, abd pain, or changes in appetite.  : Denies dysuria, incontinence, or hematuria.  Musculoskeletal: Denies joint pain or myalgias.  Skin/breast: Denies rashes, lumps, lesions, or discharge.  Neurologic: Denies headache, dizziness, vertigo, or paresthesias.  Psychiatric: Denies changes in mood or hallucinations.  Endocrine: Denies polyuria, polydipsia, heat/cold intolerance.  Hematologic/Lymph: Denies lymphadenopathy, easy bruising or easy bleeding.  Allergic/Immunologic: Denies rash, rhinitis.   Objective:     Vitals:  Temp: 98.3 °F (36.8 °C)  Pulse: (!) 55  Rhythm: normal sinus rhythm  BP: (!) 120/59  MAP (mmHg): 85  Resp: 20  SpO2: 98 %  O2 Device (Oxygen Therapy): nasal cannula    Temp  Min: 98.3 °F (36.8 °C)  Max: 98.8 °F (37.1 °C)  Pulse  Min: 55  Max: 66  BP  Min: 85/43  Max: 138/64  MAP (mmHg)  Min: 56  Max: 92  Resp  Min: 10  Max: 35  SpO2  Min: 89 %  Max: 100 %    12/09 0701 - 12/10 0700  In: 3726.7 [P.O.:950; I.V.:1626.7]  Out: 1675 [Urine:1510; Drains:165]   Unmeasured Output  Urine Occurrence: 1  Stool Occurrence: 0  Emesis Occurrence: 0  Pad Count: 2       Physical Exam  GA:comfortable, no acute distress.   HEENT: No scleral icterus or JVD.   Pulmonary: Clear to auscultation A/L.  Cardiac: RRR S1 & S2 w/o rubs/murmurs/gallops.   Abdominal: Bowel sounds present x 4. No appreciable hepatosplenomegaly.  Skin: No jaundice, rashes, or visible lesions.  Neuro:  --GCS: E4 V5 M6  --Mental Status:  Awake, oriented X4, follows commands, fluent speech  --CN II-XII grossly intact.   --Pupils 3->2mm, PERRL.   --Corneal reflex, gag,  cough intact.  --RICHTER spont    Medications:  Continuoussodium chloride 0.9%, Last Rate: 50 mL/hr at 12/10/20 1200    ScheduledbisacodyL, 10 mg, Daily  ceftaroline fosamil, 600 mg, Q8H  donepeziL, 10 mg, QHS  DULoxetine, 60 mg, Daily  heparin (porcine), 5,000 Units, Q8H  memantine, 10 mg, BID  methocarbamoL, 500 mg, QID  mupirocin, , BID  pantoprazole, 40 mg, Before breakfast  pravastatin, 40 mg, QHS  senna-docusate 8.6-50 mg, 1 tablet, BID  vancomycin (VANCOCIN) IVPB, 1,250 mg, Q24H    PRNsodium chloride, , Q24H PRN  acetaminophen, 650 mg, Q6H PRN  albuterol-ipratropium, 3 mL, Q6H PRN  aluminum-magnesium hydroxide-simethicone, 30 mL, Q4H PRN  dextrose 50%, 12.5 g, PRN  dextrose 50%, 25 g, PRN  glucagon (human recombinant), 1 mg, PRN  glucose, 16 g, PRN  glucose, 16 g, PRN  glucose, 24 g, PRN  insulin aspart U-100, 0-5 Units, QID (AC + HS) PRN  labetaloL, 10 mg, Q4H PRN  magnesium oxide, 800 mg, PRN  magnesium oxide, 800 mg, PRN  morphine, 2 mg, Q1H PRN  ondansetron, 4 mg, Q8H PRN  oxyCODONE, 5 mg, Q6H PRN  oxyCODONE, 10 mg, Q6H PRN  potassium bicarbonate, 10 mEq, PRN  potassium bicarbonate, 50 mEq, PRN  potassium bicarbonate, 50 mEq, PRN  potassium, sodium phosphates, 2 packet, PRN  potassium, sodium phosphates, 2 packet, PRN  potassium, sodium phosphates, 2 packet, PRN  promethazine (PHENERGAN) IVPB, 6.25 mg, Q6H PRN  senna-docusate 8.6-50 mg, 2 tablet, Nightly PRN  vancomycin - pharmacy to dose, , pharmacy to manage frequency      Today I personally reviewed pertinent medications, lines/drains/airways, imaging, cardiology results, laboratory results, microbiology results,     Diet  Diet Adult Regular (IDDSI Level 7)

## 2020-12-10 NOTE — PLAN OF CARE
Patient to step down to the floor today per MD.  Therapy is recommending inpatient rehab.        12/10/20 1554   Discharge Reassessment   Assessment Type Discharge Planning Reassessment   Provided patient/caregiver education on the expected discharge date and the discharge plan No   Do you have any problems affording any of your prescribed medications? No   Discharge Plan A Rehab   Discharge Plan B Home Health   DME Needed Upon Discharge  other (see comments)  (tbd)   Patient choice form signed by patient/caregiver N/A   Anticipated Discharge Disposition Rehab   Can the patient/caregiver answer the patient profile reliably? Yes, cognitively intact   How does the patient rate their overall health at the present time? Fair   Describe the patient's ability to walk at the present time. Major restrictions/daily assistance from another person   How often would a person be available to care for the patient? Whenever needed   Number of comorbid conditions (as recorded on the chart) Five or more       Olivia Rosas RN, CCRN-K, [facility]  Neuro-Critical Care   [phone]

## 2020-12-10 NOTE — ASSESSMENT & PLAN NOTE
Continue 0.9% NaCl infusion  Sodium level 131 in AM  Repeat BMP  Repeat Serum Na  12/10/2020: Na today 138, resolved

## 2020-12-10 NOTE — ASSESSMENT & PLAN NOTE
POA  ID following   was on IV vancomycin for 6-8 weeks per ID ( estimated end date 12/3)  Continue ceftaroline 600mg q8h  Start Vanc 2 mg per ID  Bone/tissue sent from OR for pathology, gram stain, aerobic/anaerobic, AFB and fungal cultures.  anticipate 6 weeks ABX post op   12/10/2020: stepdown to NSGY team

## 2020-12-10 NOTE — ASSESSMENT & PLAN NOTE
71y/o F with hx of thoracic spine fracture; 10/8 with severe back pain. MRI revealed T9-10 osteodiscitis and T8-10 epidural phlegmon with associated paraverterbral abscesses. Spine infection likely hematogenous from under-treated bacteremia.    S/P T9-10 corpectomy; T7-12 posterior fusion POD#0 with 2 hemovac drains to full suction  Wound vac intact to incision for 5 days  NSGY following  ID following  TLSO brace in place when up  -160, MAP > 65  Tylenol 1g q8h  ceftaroline 600mg q8h  Start Vanc 2 g per ID  methacarbmol 500mg qid  mupiricin oint. To nares bid  Pain regimen oxycodone 5mg q6h prn moderate pain 10mg for severe pain  Morphine 2mg q1h severe pain not relieved by oral meds  PT/OT/SLP when appropriate  12/10/2020: stepdown to NSGY team  Continue abx per ID

## 2020-12-10 NOTE — PLAN OF CARE
Problem: Physical Therapy Goal  Goal: Physical Therapy Goal  Description: Goals to be met by: 2020     Patient will increase functional independence with mobility by performin. Supine to sit with MInimal Assistance  2. Sit to supine with MInimal Assistance  3. Rolling to Left and Right with Minimal Assistance.  4. Bed to chair transfer with Maximum Assistance using Slideboard  5. Sitting at edge of bed x8 minutes with Stand-by Assistance  6. Lower extremity exercise program x30 reps per handout, with independence    Outcome: Ongoing, Progressing     Discharge Recommendations: Rehab    Pt requires total A of 2-3 to sit at the EOB.    Goals remain appropriate.     Viridiana Bermudez, PTA.   [phone]   12/10/2020

## 2020-12-10 NOTE — ASSESSMENT & PLAN NOTE
70 F with known MRSA bacteremia with thoracic osteomyelitis presenting as transfer from SNF after CT T spine concerning for increased erosion of T9/T10 vertebral bodies as well as bilateral T9 pedicle fractures:    Neurologically stable on exam.    - Admitted to [facility]  - q1h neuro checks  - Post op imaging pending  - Continue HV drains, abx while in place  - TLSO brace at all times  - Pending post op XR  - Pain control: scheduled Tylenol and Robaxin, Oxycodone PRN  - Recommend repeat TTE as outpatient given changes from 8/10 - 10/12. Pt may have had NSTEMI. Endocarditis felt less likely.  - H/o MRSA Bacteremia: Afebrile, no leukocytosis. ESR 97, CRP 12.4.    - ID following, appreciate assistance. Continue ceftaroline. Send OR Cx's and pathology.  - CAD, s/p Cardiac Stent: MI in 2000 which required stent placements. Pt takes ASA and Plavix, last dose  11/30.   - Hold ASA/Plavix preoperatively  - Alzheimer's: Continue home memantine and donepezil  - HLD: Continue home pravastatin  -DVT prophylaxis: SHAYLA's, SCD's, SQH.   -Bowel regimen: senna BID     Contact Neurosurgery with any questions, concerns, or neuro changes.

## 2020-12-10 NOTE — SUBJECTIVE & OBJECTIVE
Interval History: No AEON. Afebrile and WBC WNL.  Blood cultures finalized negative.  Back pain not well controlled.  The patient denies any recent fever, chills, or sweats.    Review of Systems   Constitutional: Negative for activity change, chills, diaphoresis and fever.   Respiratory: Negative for cough, shortness of breath and wheezing.    Cardiovascular: Negative for chest pain.   Gastrointestinal: Negative for abdominal pain, constipation, diarrhea, nausea and vomiting.   Genitourinary: Negative for dysuria, frequency and urgency.   Musculoskeletal: Positive for back pain.   Neurological: Negative for dizziness.   Hematological: Does not bruise/bleed easily.     Objective:     Vital Signs (Most Recent):  Temp: 98.8 °F (37.1 °C) (12/10/20 0700)  Pulse: 64 (12/10/20 0900)  Resp: (!) 21 (12/10/20 0900)  BP: 138/64 (12/10/20 0900)  SpO2: 99 % (12/10/20 0900) Vital Signs (24h Range):  Temp:  [98.2 °F (36.8 °C)-98.8 °F (37.1 °C)] 98.8 °F (37.1 °C)  Pulse:  [57-66] 64  Resp:  [10-35] 21  SpO2:  [89 %-100 %] 99 %  BP: ()/(40-65) 138/64  Arterial Line BP: ()/() 42/30     Weight: 125.4 kg (276 lb 7.3 oz)  Body mass index is 39.67 kg/m².    Estimated Creatinine Clearance: 91.6 mL/min (based on SCr of 0.8 mg/dL).    Physical Exam  Constitutional:       General: She is not in acute distress.     Appearance: She is well-developed. She is obese. She is not ill-appearing or diaphoretic.       HENT:      Head: Normocephalic and atraumatic.      Nose: Nose normal.   Cardiovascular:      Rate and Rhythm: Normal rate and regular rhythm.      Heart sounds: Normal heart sounds. No murmur. No friction rub. No gallop.    Pulmonary:      Effort: Pulmonary effort is normal. No respiratory distress.      Breath sounds: Normal breath sounds. No stridor.      Comments: o2 via NC  Abdominal:      General: There is no distension.      Palpations: Abdomen is soft.      Tenderness: There is no abdominal tenderness.    Genitourinary:     Comments: corrina  Musculoskeletal:      Comments: Surgical drains bilaterally with serosanguinous output   Skin:     General: Skin is warm and dry.      Comments: PICC c/d/i   Neurological:      Mental Status: She is alert and oriented to person, place, and time.   Psychiatric:         Behavior: Behavior normal.         Significant Labs:   Blood Culture:   Recent Labs   Lab 09/23/20  1433 09/26/20  1452 10/08/20  2344 10/08/20  2345 12/02/20  0020   LABBLOO Gram stain dale bottle: Gram positive cocci in clusters resembling Staph   Results called to and read back by:Parag Galvan RN 09/24/2020  11:30  Gram stain aer bottle: Gram positive cocci in clusters resembling Staph   Positive results previously called 09/24/2020  13:51  METHICILLIN RESISTANT STAPHYLOCOCCUS AUREUS  ID consult required at [facility],[place] and [place] locations.  For susceptibility see order #[id number]  * No growth after 5 days. No growth after 5 days. No growth after 5 days. No growth after 5 days.     CBC:   Recent Labs   Lab 12/08/20  1216 12/09/20  0406 12/10/20  0302   WBC  --  11.37 10.31   HGB  --  8.9* 7.9*   HCT 30* 29.0* 26.9*   PLT  --  258 213     CMP:   Recent Labs   Lab 12/09/20  0406 12/09/20  1357 12/10/20  0302   * 136 138   K 3.9 4.1 3.9    105 109   CO2 25 26 24   * 92 109   BUN 10 9 9   CREATININE 0.9 0.8 0.8   CALCIUM 9.4 9.5 9.0   PROT 5.5*  --  4.8*   ALBUMIN 2.2*  --  1.8*   BILITOT 0.2  --  0.2   ALKPHOS 99  --  88   AST 14  --  10   ALT 7*  --  <5*   ANIONGAP 4* 5* 5*   EGFRNONAA >60.0 >60.0 >60.0     Wound Culture:   Recent Labs   Lab 10/16/20  1228 12/08/20  1139   LABAERO No growth No growth  No growth     All pertinent labs within the past 24 hours have been reviewed.    Significant Imaging: I have reviewed all pertinent imaging results/findings within the past 24 hours.   CT Interoperative Limited [[id number]] Resulted: 12/08/20 1823   Order Status: Sent Updated:  12/08/20 1824   SURG FL Surgery Fluoro Usage [[id number]] Resulted: 12/08/20 1322   Order Status: Completed Updated: 12/08/20 1322   Narrative:     See OP Notes for results.     IMPRESSION: See OP Notes for results.             This procedure was auto-finalized by: Roxana Radiologist       CT Cervical Spine Without Contrast [[id number]] Resulted: 12/04/20 0626   Order Status: Completed Updated: 12/04/20 0628   Narrative:     EXAMINATION:   CT CERVICAL SPINE WITHOUT CONTRAST     CLINICAL HISTORY:   evaluate for fracture; prevertebral edema at C5-6 on MRI;     TECHNIQUE:   Low dose axial images, sagittal and coronal reformations were performed though the cervical spine.  Contrast was not administered.     COMPARISON:   MRI cervical, thoracic and lumbar spine 12/02/2020, cervical spine MRI 06/01/2015     FINDINGS:   Cervical vertebral body alignment appears to be within normal limits.  Vertebral body heights appear maintained and similar to prior MRI of 2015.  No definite CT evidence to suggest acute cervical spine fracture. The facet joints articulate appropriately. No significant prevertebral soft tissue swelling noting the cervical esophagus is slightly thickened. There is multilevel intervertebral disc height loss and extensive multilevel degenerative change of the visualized cervical spine primarily consisting of multiple posterior disc osteophyte complexes, uncovertebral joint DJD and facet arthropathy.  C4-C5 and C5-C6 levels where there are varying degrees of moderate/severe neural foraminal narrowing bilaterally.  The visualized skull base is intact.  The visualized lung apices demonstrate bilateral pleural fluid.    Impression:       1. No CT evidence to suggest acute cervical spine fracture.  Clinical correlation and further evaluation as warranted.   2. Multilevel degenerative change of the cervical spine most pronounced at the C4-C5 and C5-C6 levels.       Electronically signed by: Christy Mcgee MD   Date:  12/04/2020   Time: 06:26   MRI SPINE CERVICAL-THORACIC-LUMBAR W W/O CONTRAST (XPD) [[id number]] (Abnormal) Resulted: 12/02/20 0626   Order Status: Completed Updated: 12/02/20 0628   Narrative:     EXAMINATION:   MRI SPINE CERVICAL-THORACIC-LUMBAR W W/O CONTRAST (XPD)     CLINICAL HISTORY:   worsening osteo;     TECHNIQUE:   Multiplanar, multisequence MR images of the cervical, thoracic and lumbar spine were performed before and after the administration of 10 cc gadolinium based IV contrast.     COMPARISON:   *CT thoracic spine 11/30/2020 10/13/2020   *MRI thoracic spine 11/09/2020, 10/12/2020   *CT lumbar spine 10/13/2020   *MRI lumbar spine 10/12/2020     FINDINGS:   Cervical spine:     Please note image quality is degraded by patient motion artifact, most notably sagittal postcontrast images.  Cervical vertebral bodies demonstrate no evidence to suggest acute fracture or abnormal infiltrating marrow replacement process.  There is multilevel intervertebral disc desiccation and disc height loss most pronounced at the C5-C6 level.  The craniocervical junction is within normal limits.  The cervical spinal cord is normal in caliber and signal intensity.  There is multilevel degenerative change of the cervical spine consisting of uncovertebral joint DJD, posterior disc osteophyte complexes and facet arthropathy.  Degenerative change most pronounced at the C5-C6 level where there is effacement of the anterior thecal sac and moderate/severe bilateral neural foraminal narrowing (right greater than left).  Following the administration of IV contrast, no pathologic foci of enhancement are appreciated.  Incidentally visualized soft tissue structures demonstrate a presumed sebaceous cyst in the right posterior extra thoracic soft tissues.     Thoracic:     There is redemonstration of abnormal marrow signal intensity and postcontrast enhancement involving the T9-T10 level consistent with patient's known osteomyelitis/discitis.   There is abnormal fluid signal and peripheral enhancement involving the T9-T10 intervertebral disc space concerning for associated disc space abscess.  There is abnormal signal intensity and postcontrast enhancement involving the anterior and posterior epidural spaces from the T8 through T10 level in keeping with associated epidural phlegmon.  There is associated mass effect on the thoracic spinal cord without definite abnormal intramedullary cord signal intensity.  Phlegmonous change appears to involve the T8-T9 and T9-T10 neural foramina.  There is associated paravertebral phlegmonous change also at these levels.  There is subtle T2/STIR signal hyperintensity involving the T3-T4 intervertebral disc space noting this appears increased in conspicuity from prior examination.  This could reflect an additional region of discitis/osteomyelitis and attention on follow-up exam is advised.  Thoracic vertebral body alignment is stable and there is redemonstration of multilevel degenerative change.  Incidentally visualized intrathoracic structures demonstrate a moderate-sized hiatal hernia and left pleural fluid.     Lumbar spine:     There is grade 2 anterolisthesis of L5 on S1 secondary to bilateral L5 pars defects, unchanged from prior examination.  There is grade 1 anterolisthesis of L3 on L4, also unchanged.  Lumbar vertebral bodies demonstrate no evidence of acute fracture or infiltrating marrow replacement process.  There is multilevel intervertebral disc desiccation.  The conus medullaris is normal in morphology and terminates at the T12-L1 level.  Following the administration of IV contrast, no pathologic foci of enhancement are appreciated.  There is multilevel degenerative change of the lumbar spine similar to most recent MRI of 10/12/2020.  There is a subcentimeter right renal cortical T2 hyperintensity, likely a cyst.  There is fatty atrophy of the paraspinal musculature.    Impression:       1. Findings in  keeping with known T9-T10 and osteomyelitis/discitis with associated intervertebral disc space abscess, epidural phlegmon with associated mass effect on the thoracic spinal cord and paravertebral phlegmon as discussed above.  Findings do not appear significantly improved when compared to most recent exam of 11/09/2020.   2. Subtle T2/STIR signal hyperintensity involving the T3-T4 intervertebral disc space, increased in conspicuity from prior examination. This could reflect an additional region of discitis/osteomyelitis and attention on follow-up exam is advised.   3. No evidence to suggest osteomyelitis/discitis in the cervical or lumbar spine at this time.  Multilevel degenerative change of the cervical and lumbar spine.   4. Moderate size hiatal hernia and left pleural effusion.   This report was flagged in Epic as abnormal.     Electronically signed by resident: Leif Glover   Date: 12/02/2020   Time: 04:58     Electronically signed by: Christy Mcgee MD   Date: 12/02/2020   Time: 06:26   Imaging History    2020  Date Procedure Name Status Accession Number Location   12/03/20 03:16 PM CT Cervical Spine Without Contrast Final [id number] [facility]   12/02/20 04:03 AM MRI SPINE CERVICAL-THORACIC-LUMBAR W W/O CONTRAST (XPD) Final [id number] [facility]   11/30/20 02:10 PM CT Thoracic Spine Without Contrast Final [id number] [facility]   11/09/20 12:08 PM MRI Thoracic Spine W WO Cont Final [id number] [facility]

## 2020-12-10 NOTE — PT/OT/SLP PROGRESS
"Physical Therapy Treatment  Co-Tx with OT due to pt's complexity and benefit of 2 skilled therapists to facilitate safe functional mobility at this time      Patient Name:  Martina Betancourt   MRN:  [id number]  Admitting Diagnosis: Osteomyelitis of thoracic vertebra  Recent Surgery: Procedure(s) (LRB):  T9-T10 corpectomy, posterior instrumented fusion T7-T12. (N/A) 2 Days Post-Op    Recommendations:     Discharge Recommendations:  rehabilitation facility   Discharge Equipment Recommendations: ([facility])   Barriers to discharge: Inaccessible home and Decreased caregiver support  Pt requiring increased assistance at current time.       Plan:     During this hospitalization, patient to be seen 4 x/week to address the above listed problems via gait training, therapeutic activities, therapeutic exercises, neuromuscular re-education  · Plan of Care Expires:  01/08/21   Plan of Care Reviewed with: patient    This Plan of care has been discussed with the patient who was involved in its development and understands and is in agreement with the identified goals and treatment plan    Subjective     Communicated with nurse (Tesha) prior to session.     Patient comments: "I feel like a tick is pressing on my back"  Pain/Comfort:  · Pain Rating 1: 8/10  · Location - Side 1: Right  · Location - Orientation 1: generalized  · Location 1: back  · Pain Addressed 1: Pre-medicate for activity, Reposition, Distraction, Cessation of Activity, Nurse notified  · Pain Rating Post-Intervention 1: (pt resting comfortable at the end of PT session)    Objective:     Patient found with: bed alarm, blood pressure cuff, pressure relief boots, PureWick, peripheral IV, pulse ox (continuous), SCD, telemetry, wound vac, hemovac(waffle pad)    Patient found sup in bed upon PT entry to room, agreeable to treatment.  NO family present in the room.    General Precautions: Standard, fall, contact isolation 2* MRSA in blood  Orthopedic Precautions:spinal " precautions   Braces: TLSO       BED MOBILITY (vc's for hand placement sequencing of task):        Rolling to the R:  Total A of 2-3.       Rolling to the L:  Total A of 2-3.        Sup > sit at the EOB:  Total A of 3 for trunk elevation and LE's exiting on the L side.       Sit > sup:  Total A of 3 for trunk and LE's.       Scooting hips to EOB with max A of 2        Pt was dependent of 3 helpers to scoot to HOB via drawsheet x2 scoot(s).                SITTING AT THE EDGE OF THE BED (13-15 min)   Assistance Level Required: initially with mod A then min/SBA for trunk with B UE support   Postural deviations noted: L post lean   Encouraged: midline orientation, B feet in contact with the floor        TRANSFERS         NP this session 2* weakness, pain and fatigue    EDUCATION  Patient provided with daily orientation and goals of this PT session. They were educated to call for assistance and to transfer with hospital staff only.  Also, pt was educated on the effects of prolonged immobility and the importance of performing OOB activity and exercises to promote healing and reduce recovery time      Whiteboard updated with correct mobility information. RN/PCT notified.  Pt requires total A of 2-3 to sit at the EOB.    Patient left supine, with  all lines intact, call button in reach, bed alarm on and nurse notified    AM-PAC 6 CLICK MOBILITY  Turning over in bed (including adjusting bedclothes, sheets and blankets)?: 2  Sitting down on and standing up from a chair with arms (e.g., wheelchair, bedside commode, etc.): 1  Moving from lying on back to sitting on the side of the bed?: 2  Moving to and from a bed to a chair (including a wheelchair)?: 1  Need to walk in hospital room?: 1  Climbing 3-5 steps with a railing?: 1  Basic Mobility Total Score: 8     Assessment:     Martina Betancourt is a 70 y.o. female admitted with a medical diagnosis of Osteomyelitis of thoracic vertebra.  She presents with the following  impairments/functional limitations:  weakness, impaired endurance, impaired sensation, impaired self care skills, impaired functional mobilty, gait instability, impaired balance, decreased upper extremity function, decreased lower extremity function, pain, impaired coordination, impaired fine motor, edema, orthopedic precautions. requiring significant assistance and verbal cues for bed mob, scooting to EOB/HOB and static sitting to prevent post lean due to weakness, and pain.   In light of pt's current functional level and deficits, it is anticipated that pt will need to participate in an intense rehab program consisting of PT and OT in order to achieve full rehab potential to return to previous level of function and roles.  Pt remains motivated to participate in PT session and will cont to benefit from skilled PT intervention.      Rehab Prognosis:  Fair; patient would benefit from acute skilled PT services to address these deficits and reach maximum level of function.      GOALS:   Multidisciplinary Problems     Physical Therapy Goals        Problem: Physical Therapy Goal    Goal Priority Disciplines Outcome Goal Variances Interventions   Physical Therapy Goal     PT, PT/OT Ongoing, Progressing     Description: Goals to be met by: 2020     Patient will increase functional independence with mobility by performin. Supine to sit with MInimal Assistance  2. Sit to supine with MInimal Assistance  3. Rolling to Left and Right with Minimal Assistance.  4. Bed to chair transfer with Maximum Assistance using Slideboard  5. Sitting at edge of bed x8 minutes with Stand-by Assistance  6. Lower extremity exercise program x30 reps per handout, with independence                     Time Tracking:     PT Received On: 12/10/20  PT Start Time: 1120     PT Stop Time: 1200  PT Total Time (min): 40 min     Billable Minutes: Therapeutic Activity 40    Treatment Type: Treatment  PT/PTA: PTA     PTA Visit Number: 1        Viridiana Bermudez PTA.  Pager [phone]    12/10/2020    .

## 2020-12-11 LAB
ALBUMIN SERPL BCP-MCNC: 1.8 G/DL (ref 3.5–5.2)
ALP SERPL-CCNC: 97 U/L (ref 55–135)
ALT SERPL W/O P-5'-P-CCNC: <5 U/L (ref 10–44)
ANION GAP SERPL CALC-SCNC: 8 MMOL/L (ref 8–16)
AST SERPL-CCNC: 11 U/L (ref 10–40)
BACTERIA SPEC AEROBE CULT: NO GROWTH
BASOPHILS # BLD AUTO: 0.01 K/UL (ref 0–0.2)
BASOPHILS NFR BLD: 0.1 % (ref 0–1.9)
BILIRUB SERPL-MCNC: 0.3 MG/DL (ref 0.1–1)
BUN SERPL-MCNC: 8 MG/DL (ref 8–23)
CALCIUM SERPL-MCNC: 8.9 MG/DL (ref 8.7–10.5)
CHLORIDE SERPL-SCNC: 106 MMOL/L (ref 95–110)
CO2 SERPL-SCNC: 24 MMOL/L (ref 23–29)
CREAT SERPL-MCNC: 0.7 MG/DL (ref 0.5–1.4)
DIFFERENTIAL METHOD: ABNORMAL
EOSINOPHIL # BLD AUTO: 0.1 K/UL (ref 0–0.5)
EOSINOPHIL NFR BLD: 0.7 % (ref 0–8)
ERYTHROCYTE [DISTWIDTH] IN BLOOD BY AUTOMATED COUNT: 16.8 % (ref 11.5–14.5)
EST. GFR  (AFRICAN AMERICAN): >60 ML/MIN/1.73 M^2
EST. GFR  (NON AFRICAN AMERICAN): >60 ML/MIN/1.73 M^2
GLUCOSE SERPL-MCNC: 74 MG/DL (ref 70–110)
HCT VFR BLD AUTO: 30.2 % (ref 37–48.5)
HGB BLD-MCNC: 9.1 G/DL (ref 12–16)
IMM GRANULOCYTES # BLD AUTO: 0.03 K/UL (ref 0–0.04)
IMM GRANULOCYTES NFR BLD AUTO: 0.4 % (ref 0–0.5)
LYMPHOCYTES # BLD AUTO: 1.2 K/UL (ref 1–4.8)
LYMPHOCYTES NFR BLD: 13.8 % (ref 18–48)
MAGNESIUM SERPL-MCNC: 1.7 MG/DL (ref 1.6–2.6)
MCH RBC QN AUTO: 28.1 PG (ref 27–31)
MCHC RBC AUTO-ENTMCNC: 30.1 G/DL (ref 32–36)
MCV RBC AUTO: 93 FL (ref 82–98)
MONOCYTES # BLD AUTO: 1.4 K/UL (ref 0.3–1)
MONOCYTES NFR BLD: 16.4 % (ref 4–15)
NEUTROPHILS # BLD AUTO: 5.9 K/UL (ref 1.8–7.7)
NEUTROPHILS NFR BLD: 68.6 % (ref 38–73)
NRBC BLD-RTO: 0 /100 WBC
PHOSPHATE SERPL-MCNC: 2 MG/DL (ref 2.7–4.5)
PLATELET # BLD AUTO: 224 K/UL (ref 150–350)
PMV BLD AUTO: 10.6 FL (ref 9.2–12.9)
POTASSIUM SERPL-SCNC: 4.1 MMOL/L (ref 3.5–5.1)
PROT SERPL-MCNC: 5.1 G/DL (ref 6–8.4)
RBC # BLD AUTO: 3.24 M/UL (ref 4–5.4)
SODIUM SERPL-SCNC: 138 MMOL/L (ref 136–145)
WBC # BLD AUTO: 8.56 K/UL (ref 3.9–12.7)

## 2020-12-11 PROCEDURE — 99900035 HC TECH TIME PER 15 MIN (STAT)

## 2020-12-11 PROCEDURE — 25000003 PHARM REV CODE 250: Performed by: PHYSICIAN ASSISTANT

## 2020-12-11 PROCEDURE — 27000221 HC OXYGEN, UP TO 24 HOURS

## 2020-12-11 PROCEDURE — 83735 ASSAY OF MAGNESIUM: CPT

## 2020-12-11 PROCEDURE — 25000003 PHARM REV CODE 250: Performed by: NEUROLOGICAL SURGERY

## 2020-12-11 PROCEDURE — 97802 MEDICAL NUTRITION INDIV IN: CPT

## 2020-12-11 PROCEDURE — 97535 SELF CARE MNGMENT TRAINING: CPT

## 2020-12-11 PROCEDURE — 63600175 PHARM REV CODE 636 W HCPCS: Performed by: NEUROLOGICAL SURGERY

## 2020-12-11 PROCEDURE — 25000003 PHARM REV CODE 250: Performed by: STUDENT IN AN ORGANIZED HEALTH CARE EDUCATION/TRAINING PROGRAM

## 2020-12-11 PROCEDURE — 97530 THERAPEUTIC ACTIVITIES: CPT | Mod: CQ

## 2020-12-11 PROCEDURE — 99233 SBSQ HOSP IP/OBS HIGH 50: CPT | Mod: ,,, | Performed by: PHYSICIAN ASSISTANT

## 2020-12-11 PROCEDURE — 36415 COLL VENOUS BLD VENIPUNCTURE: CPT

## 2020-12-11 PROCEDURE — 97112 NEUROMUSCULAR REEDUCATION: CPT

## 2020-12-11 PROCEDURE — 63600175 PHARM REV CODE 636 W HCPCS: Mod: JG | Performed by: PHYSICIAN ASSISTANT

## 2020-12-11 PROCEDURE — 84100 ASSAY OF PHOSPHORUS: CPT

## 2020-12-11 PROCEDURE — 85025 COMPLETE CBC W/AUTO DIFF WBC: CPT

## 2020-12-11 PROCEDURE — 99024 POSTOP FOLLOW-UP VISIT: CPT | Mod: POP,,, | Performed by: PHYSICIAN ASSISTANT

## 2020-12-11 PROCEDURE — 99233 PR SUBSEQUENT HOSPITAL CARE,LEVL III: ICD-10-PCS | Mod: ,,, | Performed by: PHYSICIAN ASSISTANT

## 2020-12-11 PROCEDURE — 11000001 HC ACUTE MED/SURG PRIVATE ROOM

## 2020-12-11 PROCEDURE — 63600175 PHARM REV CODE 636 W HCPCS: Performed by: PHYSICIAN ASSISTANT

## 2020-12-11 PROCEDURE — 80053 COMPREHEN METABOLIC PANEL: CPT

## 2020-12-11 PROCEDURE — 99024 PR POST-OP FOLLOW-UP VISIT: ICD-10-PCS | Mod: POP,,, | Performed by: PHYSICIAN ASSISTANT

## 2020-12-11 PROCEDURE — 25000003 PHARM REV CODE 250: Performed by: NURSE PRACTITIONER

## 2020-12-11 RX ORDER — ISOSORBIDE MONONITRATE 30 MG/1
60 TABLET, EXTENDED RELEASE ORAL DAILY
Status: DISCONTINUED | OUTPATIENT
Start: 2020-12-11 | End: 2020-12-18 | Stop reason: HOSPADM

## 2020-12-11 RX ORDER — METHOCARBAMOL 750 MG/1
750 TABLET, FILM COATED ORAL 4 TIMES DAILY
Status: DISCONTINUED | OUTPATIENT
Start: 2020-12-11 | End: 2020-12-18 | Stop reason: HOSPADM

## 2020-12-11 RX ORDER — SODIUM,POTASSIUM PHOSPHATES 280-250MG
1 POWDER IN PACKET (EA) ORAL EVERY 4 HOURS
Status: COMPLETED | OUTPATIENT
Start: 2020-12-11 | End: 2020-12-11

## 2020-12-11 RX ORDER — ACETAMINOPHEN 325 MG/1
650 TABLET ORAL EVERY 6 HOURS
Status: DISCONTINUED | OUTPATIENT
Start: 2020-12-11 | End: 2020-12-18 | Stop reason: HOSPADM

## 2020-12-11 RX ORDER — LISINOPRIL 2.5 MG/1
2.5 TABLET ORAL DAILY
Status: DISCONTINUED | OUTPATIENT
Start: 2020-12-11 | End: 2020-12-18 | Stop reason: HOSPADM

## 2020-12-11 RX ADMIN — POTASSIUM & SODIUM PHOSPHATES POWDER PACK 280-160-250 MG 1 PACKET: 280-160-250 PACK at 03:12

## 2020-12-11 RX ADMIN — MEMANTINE HYDROCHLORIDE 10 MG: 10 TABLET ORAL at 09:12

## 2020-12-11 RX ADMIN — OXYCODONE HYDROCHLORIDE 10 MG: 10 TABLET ORAL at 11:12

## 2020-12-11 RX ADMIN — DULOXETINE HYDROCHLORIDE 60 MG: 60 CAPSULE, DELAYED RELEASE ORAL at 08:12

## 2020-12-11 RX ADMIN — CEFTAROLINE FOSAMIL 600 MG: 600 POWDER, FOR SOLUTION INTRAVENOUS at 09:12

## 2020-12-11 RX ADMIN — ACETAMINOPHEN 650 MG: 325 TABLET ORAL at 05:12

## 2020-12-11 RX ADMIN — METHOCARBAMOL 750 MG: 750 TABLET ORAL at 01:12

## 2020-12-11 RX ADMIN — OXYCODONE HYDROCHLORIDE 5 MG: 5 TABLET ORAL at 10:12

## 2020-12-11 RX ADMIN — HEPARIN SODIUM 5000 UNITS: 5000 INJECTION INTRAVENOUS; SUBCUTANEOUS at 05:12

## 2020-12-11 RX ADMIN — METHOCARBAMOL 500 MG: 500 TABLET ORAL at 08:12

## 2020-12-11 RX ADMIN — BISACODYL 10 MG: 10 SUPPOSITORY RECTAL at 08:12

## 2020-12-11 RX ADMIN — OXYCODONE HYDROCHLORIDE 5 MG: 5 TABLET ORAL at 12:12

## 2020-12-11 RX ADMIN — ACETAMINOPHEN 650 MG: 325 TABLET ORAL at 11:12

## 2020-12-11 RX ADMIN — CEFTAROLINE FOSAMIL 600 MG: 600 POWDER, FOR SOLUTION INTRAVENOUS at 05:12

## 2020-12-11 RX ADMIN — POTASSIUM & SODIUM PHOSPHATES POWDER PACK 280-160-250 MG 1 PACKET: 280-160-250 PACK at 08:12

## 2020-12-11 RX ADMIN — OXYCODONE HYDROCHLORIDE 10 MG: 10 TABLET ORAL at 03:12

## 2020-12-11 RX ADMIN — HEPARIN SODIUM 5000 UNITS: 5000 INJECTION INTRAVENOUS; SUBCUTANEOUS at 03:12

## 2020-12-11 RX ADMIN — SODIUM CHLORIDE 50 ML/HR: 0.9 INJECTION, SOLUTION INTRAVENOUS at 05:12

## 2020-12-11 RX ADMIN — METHOCARBAMOL 750 MG: 750 TABLET ORAL at 09:12

## 2020-12-11 RX ADMIN — MUPIROCIN: 20 OINTMENT TOPICAL at 09:12

## 2020-12-11 RX ADMIN — VANCOMYCIN HYDROCHLORIDE 1250 MG: 10 INJECTION, POWDER, LYOPHILIZED, FOR SOLUTION INTRAVENOUS at 06:12

## 2020-12-11 RX ADMIN — LISINOPRIL 2.5 MG: 2.5 TABLET ORAL at 08:12

## 2020-12-11 RX ADMIN — ACETAMINOPHEN 650 MG: 325 TABLET ORAL at 01:12

## 2020-12-11 RX ADMIN — PANTOPRAZOLE SODIUM 40 MG: 40 TABLET, DELAYED RELEASE ORAL at 05:12

## 2020-12-11 RX ADMIN — OXYCODONE HYDROCHLORIDE 10 MG: 10 TABLET ORAL at 05:12

## 2020-12-11 RX ADMIN — ISOSORBIDE MONONITRATE 60 MG: 30 TABLET, EXTENDED RELEASE ORAL at 08:12

## 2020-12-11 RX ADMIN — MUPIROCIN: 20 OINTMENT TOPICAL at 08:12

## 2020-12-11 RX ADMIN — METHOCARBAMOL 750 MG: 750 TABLET ORAL at 05:12

## 2020-12-11 RX ADMIN — HEPARIN SODIUM 5000 UNITS: 5000 INJECTION INTRAVENOUS; SUBCUTANEOUS at 09:12

## 2020-12-11 RX ADMIN — MEMANTINE HYDROCHLORIDE 10 MG: 10 TABLET ORAL at 08:12

## 2020-12-11 RX ADMIN — SENNOSIDES AND DOCUSATE SODIUM 1 TABLET: 8.6; 5 TABLET ORAL at 09:12

## 2020-12-11 RX ADMIN — SENNOSIDES AND DOCUSATE SODIUM 1 TABLET: 8.6; 5 TABLET ORAL at 08:12

## 2020-12-11 RX ADMIN — DONEPEZIL HYDROCHLORIDE 10 MG: 10 TABLET ORAL at 09:12

## 2020-12-11 RX ADMIN — PRAVASTATIN SODIUM 40 MG: 40 TABLET ORAL at 09:12

## 2020-12-11 RX ADMIN — CEFTAROLINE FOSAMIL 600 MG: 600 POWDER, FOR SOLUTION INTRAVENOUS at 03:12

## 2020-12-11 NOTE — PLAN OF CARE
POC reviewed with pt. Pt verbalized understanding. Questions and concerns addressed. No acute events overnight. Pt AAOx4, moves all extremities spontaneously, and denies any numbness or tingling. Pt on 3L NC with O2 sats > 90%. Pt c/o incisional pain during shift. Prn pain medication given per MAR. Fall/safety precautions maintained. Bed locked and in lowest position with call light within reach. See flowsheets for full assessment and VS information.       Problem: Adult Inpatient Plan of Care  Goal: Plan of Care Review  Outcome: Ongoing, Progressing  Goal: Optimal Comfort and Wellbeing  Outcome: Ongoing, Progressing  Goal: Rounds/Family Conference  Outcome: Ongoing, Progressing     Problem: Fall Injury Risk  Goal: Absence of Fall and Fall-Related Injury  Outcome: Ongoing, Progressing

## 2020-12-11 NOTE — PLAN OF CARE
Pt only has around 28 days of Skilled days left.  The only way she can regain full days back would be to be out of the hospital for 60 consecutive days.  SW in contact with CM and Medical staff. Will continue to follow and offer support as needed.     [place], [place]  [facility]   Ext. [phone]

## 2020-12-11 NOTE — SUBJECTIVE & OBJECTIVE
Interval History: No AEON. Afebrile and WBC WNL. Back pain improving  The patient denies any recent fever, chills, or sweats.    Review of Systems   Constitutional: Negative for activity change, chills, diaphoresis and fever.   Respiratory: Negative for cough, shortness of breath and wheezing.    Cardiovascular: Negative for chest pain.   Gastrointestinal: Negative for abdominal pain, constipation, diarrhea, nausea and vomiting.   Genitourinary: Negative for dysuria, frequency and urgency.   Musculoskeletal: Positive for back pain.   Neurological: Negative for dizziness.   Hematological: Does not bruise/bleed easily.     Objective:     Vital Signs (Most Recent):  Temp: 98.6 °F (37 °C) (12/11/20 0900)  Pulse: (!) 59 (12/11/20 0900)  Resp: 20 (12/11/20 0900)  BP: 119/68 (12/11/20 0900)  SpO2: 95 % (12/11/20 0900) Vital Signs (24h Range):  Temp:  [97.7 °F (36.5 °C)-99 °F (37.2 °C)] 98.6 °F (37 °C)  Pulse:  [55-67] 59  Resp:  [16-60] 20  SpO2:  [75 %-100 %] 95 %  BP: (110-160)/(56-75) 119/68     Weight: 125.4 kg (276 lb 7.3 oz)  Body mass index is 39.67 kg/m².    Estimated Creatinine Clearance: 104.7 mL/min (based on SCr of 0.7 mg/dL).    Physical Exam  Constitutional:       General: She is not in acute distress.     Appearance: She is well-developed. She is obese. She is not ill-appearing or diaphoretic.       HENT:      Head: Normocephalic and atraumatic.      Nose: Nose normal.   Cardiovascular:      Rate and Rhythm: Normal rate and regular rhythm.      Heart sounds: Normal heart sounds. No murmur. No friction rub. No gallop.    Pulmonary:      Effort: Pulmonary effort is normal. No respiratory distress.      Breath sounds: Normal breath sounds. No stridor.      Comments: o2 via NC  Abdominal:      General: There is no distension.      Palpations: Abdomen is soft.      Tenderness: There is no abdominal tenderness.   Genitourinary:     Comments: corrina  Musculoskeletal:      Comments: Surgical drains bilaterally  with serosanguinous output   Skin:     General: Skin is warm and dry.      Comments: PICC c/d/i   Neurological:      Mental Status: She is alert and oriented to person, place, and time.   Psychiatric:         Behavior: Behavior normal.         Significant Labs:   Blood Culture:   Recent Labs   Lab 09/23/20  1433 09/26/20  1452 10/08/20  2344 10/08/20  2345 12/02/20  0020   LABBLOO Gram stain dale bottle: Gram positive cocci in clusters resembling Staph   Results called to and read back by:Parag Galvan RN 09/24/2020  11:30  Gram stain aer bottle: Gram positive cocci in clusters resembling Staph   Positive results previously called 09/24/2020  13:51  METHICILLIN RESISTANT STAPHYLOCOCCUS AUREUS  ID consult required at [facility].Matt,Flo and Freya love.  For susceptibility see order #[id number]  * No growth after 5 days. No growth after 5 days. No growth after 5 days. No growth after 5 days.     CBC:   Recent Labs   Lab 12/10/20  0302 12/10/20  1217 12/11/20  0327   WBC 10.31 11.16 8.56   HGB 7.9* 8.3* 9.1*   HCT 26.9* 28.2* 30.2*    222 224     CMP:   Recent Labs   Lab 12/09/20  1357 12/10/20  0302 12/11/20  0327    138 138   K 4.1 3.9 4.1    109 106   CO2 26 24 24   GLU 92 109 74   BUN 9 9 8   CREATININE 0.8 0.8 0.7   CALCIUM 9.5 9.0 8.9   PROT  --  4.8* 5.1*   ALBUMIN  --  1.8* 1.8*   BILITOT  --  0.2 0.3   ALKPHOS  --  88 97   AST  --  10 11   ALT  --  <5* <5*   ANIONGAP 5* 5* 8   EGFRNONAA >60.0 >60.0 >60.0     Wound Culture:   Recent Labs   Lab 10/16/20  1228 12/08/20  1139   LABAERO No growth No growth  No growth     All pertinent labs within the past 24 hours have been reviewed.    Significant Imaging: I have reviewed all pertinent imaging results/findings within the past 24 hours.   CT Interoperative Limited [[id number]] Resulted: 12/08/20 1823   Order Status: Sent Updated: 12/08/20 1824   SURG FL Surgery Fluoro Usage [[id number]] Resulted: 12/08/20 1322   Order Status: Completed  Updated: 12/08/20 1322   Narrative:     See OP Notes for results.     IMPRESSION: See OP Notes for results.             This procedure was auto-finalized by: Virtual Radiologist       CT Cervical Spine Without Contrast [[id number]] Resulted: 12/04/20 0626   Order Status: Completed Updated: 12/04/20 0628   Narrative:     EXAMINATION:   CT CERVICAL SPINE WITHOUT CONTRAST     CLINICAL HISTORY:   evaluate for fracture; prevertebral edema at C5-6 on MRI;     TECHNIQUE:   Low dose axial images, sagittal and coronal reformations were performed though the cervical spine.  Contrast was not administered.     COMPARISON:   MRI cervical, thoracic and lumbar spine 12/02/2020, cervical spine MRI 06/01/2015     FINDINGS:   Cervical vertebral body alignment appears to be within normal limits.  Vertebral body heights appear maintained and similar to prior MRI of 2015.  No definite CT evidence to suggest acute cervical spine fracture. The facet joints articulate appropriately. No significant prevertebral soft tissue swelling noting the cervical esophagus is slightly thickened. There is multilevel intervertebral disc height loss and extensive multilevel degenerative change of the visualized cervical spine primarily consisting of multiple posterior disc osteophyte complexes, uncovertebral joint DJD and facet arthropathy.  C4-C5 and C5-C6 levels where there are varying degrees of moderate/severe neural foraminal narrowing bilaterally.  The visualized skull base is intact.  The visualized lung apices demonstrate bilateral pleural fluid.    Impression:       1. No CT evidence to suggest acute cervical spine fracture.  Clinical correlation and further evaluation as warranted.   2. Multilevel degenerative change of the cervical spine most pronounced at the C4-C5 and C5-C6 levels.       Electronically signed by: Christy Mcgee MD   Date: 12/04/2020   Time: 06:26   MRI SPINE CERVICAL-THORACIC-LUMBAR W W/O CONTRAST (XPD) [[id number]] (Abnormal)  Resulted: 12/02/20 0626   Order Status: Completed Updated: 12/02/20 0628   Narrative:     EXAMINATION:   MRI SPINE CERVICAL-THORACIC-LUMBAR W W/O CONTRAST (XPD)     CLINICAL HISTORY:   worsening osteo;     TECHNIQUE:   Multiplanar, multisequence MR images of the cervical, thoracic and lumbar spine were performed before and after the administration of 10 cc gadolinium based IV contrast.     COMPARISON:   *CT thoracic spine 11/30/2020 10/13/2020   *MRI thoracic spine 11/09/2020, 10/12/2020   *CT lumbar spine 10/13/2020   *MRI lumbar spine 10/12/2020     FINDINGS:   Cervical spine:     Please note image quality is degraded by patient motion artifact, most notably sagittal postcontrast images.  Cervical vertebral bodies demonstrate no evidence to suggest acute fracture or abnormal infiltrating marrow replacement process.  There is multilevel intervertebral disc desiccation and disc height loss most pronounced at the C5-C6 level.  The craniocervical junction is within normal limits.  The cervical spinal cord is normal in caliber and signal intensity.  There is multilevel degenerative change of the cervical spine consisting of uncovertebral joint DJD, posterior disc osteophyte complexes and facet arthropathy.  Degenerative change most pronounced at the C5-C6 level where there is effacement of the anterior thecal sac and moderate/severe bilateral neural foraminal narrowing (right greater than left).  Following the administration of IV contrast, no pathologic foci of enhancement are appreciated.  Incidentally visualized soft tissue structures demonstrate a presumed sebaceous cyst in the right posterior extra thoracic soft tissues.     Thoracic:     There is redemonstration of abnormal marrow signal intensity and postcontrast enhancement involving the T9-T10 level consistent with patient's known osteomyelitis/discitis.  There is abnormal fluid signal and peripheral enhancement involving the T9-T10 intervertebral disc space  concerning for associated disc space abscess.  There is abnormal signal intensity and postcontrast enhancement involving the anterior and posterior epidural spaces from the T8 through T10 level in keeping with associated epidural phlegmon.  There is associated mass effect on the thoracic spinal cord without definite abnormal intramedullary cord signal intensity.  Phlegmonous change appears to involve the T8-T9 and T9-T10 neural foramina.  There is associated paravertebral phlegmonous change also at these levels.  There is subtle T2/STIR signal hyperintensity involving the T3-T4 intervertebral disc space noting this appears increased in conspicuity from prior examination.  This could reflect an additional region of discitis/osteomyelitis and attention on follow-up exam is advised.  Thoracic vertebral body alignment is stable and there is redemonstration of multilevel degenerative change.  Incidentally visualized intrathoracic structures demonstrate a moderate-sized hiatal hernia and left pleural fluid.     Lumbar spine:     There is grade 2 anterolisthesis of L5 on S1 secondary to bilateral L5 pars defects, unchanged from prior examination.  There is grade 1 anterolisthesis of L3 on L4, also unchanged.  Lumbar vertebral bodies demonstrate no evidence of acute fracture or infiltrating marrow replacement process.  There is multilevel intervertebral disc desiccation.  The conus medullaris is normal in morphology and terminates at the T12-L1 level.  Following the administration of IV contrast, no pathologic foci of enhancement are appreciated.  There is multilevel degenerative change of the lumbar spine similar to most recent MRI of 10/12/2020.  There is a subcentimeter right renal cortical T2 hyperintensity, likely a cyst.  There is fatty atrophy of the paraspinal musculature.    Impression:       1. Findings in keeping with known T9-T10 and osteomyelitis/discitis with associated intervertebral disc space abscess,  epidural phlegmon with associated mass effect on the thoracic spinal cord and paravertebral phlegmon as discussed above.  Findings do not appear significantly improved when compared to most recent exam of 11/09/2020.   2. Subtle T2/STIR signal hyperintensity involving the T3-T4 intervertebral disc space, increased in conspicuity from prior examination. This could reflect an additional region of discitis/osteomyelitis and attention on follow-up exam is advised.   3. No evidence to suggest osteomyelitis/discitis in the cervical or lumbar spine at this time.  Multilevel degenerative change of the cervical and lumbar spine.   4. Moderate size hiatal hernia and left pleural effusion.   This report was flagged in Epic as abnormal.     Electronically signed by resident: Leif Glover   Date: 12/02/2020   Time: 04:58     Electronically signed by: Christy Mcgee MD   Date: 12/02/2020   Time: 06:26   Imaging History    2020  Date Procedure Name Status Accession Number Location   12/03/20 03:16 PM CT Cervical Spine Without Contrast Final [id number] [facility]   12/02/20 04:03 AM MRI SPINE CERVICAL-THORACIC-LUMBAR W W/O CONTRAST (XPD) Final [id number] [facility]   11/30/20 02:10 PM CT Thoracic Spine Without Contrast Final [id number] [facility]   11/09/20 12:08 PM MRI Thoracic Spine W WO Cont Final [id number] [facility]

## 2020-12-11 NOTE — ASSESSMENT & PLAN NOTE
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     Vancomycin discontinued and Ceftaroline started. Blood cx are negative. She is afebrile. No leukocytosis. Now s/p I&D, T9-T10 corpectomy and fusion 12/8. Cx are ngtd.    Stable on vanc and ceftaroline.  Mobilizing    Plan:  1. Continue Ceftaroline as would prefer expanded coverage though suspect likely not an abx failure but failure of source control as suspected issue.  2. DC Vancomycin  3. Follow surgical cultures but will plan on ceftaroline x 6 weeks from sx then suppression  4. Anticipate 6 weeks of IV antibiotics   5. ID will follow.

## 2020-12-11 NOTE — PROGRESS NOTES
Pharmacokinetic Initial Assessment: IV Vancomycin    Assessment/Plan:    RESTARTED Intravenous vancomycin with loading dose of n/a mg once followed by a maintenance dose of vancomycin 1250mg IV every 24 hours.  Dose was due at 1530 but delayed til 1845 so tr with new 3rd dose  Desired empiric serum trough concentration is 15 to 20 mcg/mL  Draw vancomycin trough level 60 min prior to third dose on 12/13 at approximately 1745  Pharmacy will continue to follow and monitor vancomycin.      Thank you for allowing pharmacy participate in this patient's care.     Ayla Turner, PharmD  Clinical Pharmacist, [facility]  [email or web address]  office [phone]        Patient brief summary:  Martina Betancourt is a 70 y.o. female initiated on antimicrobial therapy with IV Vancomycin for treatment of suspected bone/joint infection    Drug Allergies:   Review of patient's allergies indicates:   Allergen Reactions    Hydroxyzine hcl     Tizanidine        Actual Body Weight:   104.7kg    Renal Function:   Estimated Creatinine Clearance: 104.7 mL/min (based on SCr of 0.7 mg/dL).,     Dialysis Method (if applicable):  N/A    CBC (last 72 hours):  Recent Labs   Lab Result Units 12/09/20  0406 12/10/20  0302 12/10/20  1217 12/11/20  0327   WBC K/uL 11.37 10.31 11.16 8.56   Hemoglobin g/dL 8.9* 7.9* 8.3* 9.1*   Hemoglobin A1C %  --   --  4.8  --    Hematocrit % 29.0* 26.9* 28.2* 30.2*   Platelets K/uL 258 213 222 224   Gran % % 79.4* 69.4 66.0 68.6   Lymph % % 10.3* 13.6* 15.0* 13.8*   Mono % % 9.7 15.9* 17.9* 16.4*   Eosinophil % % 0.0 0.3 0.4 0.7   Basophil % % 0.1 0.3 0.3 0.1   Differential Method  Automated Automated Automated Automated       Metabolic Panel (last 72 hours):  Recent Labs   Lab Result Units 12/09/20  0406 12/09/20  1357 12/10/20  0302 12/11/20  0327   Sodium mmol/L 131* 136 138 138   Potassium mmol/L 3.9 4.1 3.9 4.1   Chloride mmol/L 102 105 109 106   CO2 mmol/L 25 26 24 24   Glucose mg/dL 119* 92 109  74   BUN mg/dL 10 9 9 8   Creatinine mg/dL 0.9 0.8 0.8 0.7   Albumin g/dL 2.2*  --  1.8* 1.8*   Total Bilirubin mg/dL 0.2  --  0.2 0.3   Alkaline Phosphatase U/L 99  --  88 97   AST U/L 14  --  10 11   ALT U/L 7*  --  <5* <5*   Magnesium mg/dL 1.7  --  1.9 1.7   Phosphorus mg/dL 3.2  --  2.2* 2.0*       Drug levels (last 3 results):  No results for input(s): VANCOMYCINRA, VANCOMYCINPE, VANCOMYCINTR in the last 72 hours.    Microbiologic Results:  Microbiology Results (last 7 days)       Procedure Component Value Units Date/Time    Aerobic culture [[id number]] Collected: 12/08/20 1139    Order Status: Completed Specimen: Back Updated: 12/11/20 1349     Aerobic Bacterial Culture No growth    Narrative:      T9-10 disc space    Aerobic culture [[id number]] Collected: 12/08/20 1139    Order Status: Completed Specimen: Back Updated: 12/11/20 1047     Aerobic Bacterial Culture No growth    Narrative:      T9-10 disc space    Culture, Anaerobe [[id number]] Collected: 12/08/20 1139    Order Status: Completed Specimen: Back Updated: 12/11/20 0915     Anaerobic Culture Culture in progress    Narrative:      T9-10 disc space    Culture, Anaerobe [[id number]] Collected: 12/08/20 1139    Order Status: Completed Specimen: Back Updated: 12/11/20 0915     Anaerobic Culture Culture in progress    Narrative:      T9-10 disc space    Fungus culture [[id number]] Collected: 12/08/20 1139    Order Status: Completed Specimen: Back Updated: 12/10/20 1118     Fungus (Mycology) Culture Culture in progress    Narrative:      T9-10 disc space    Fungus culture [[id number]] Collected: 12/08/20 1139    Order Status: Completed Specimen: Back Updated: 12/10/20 1118     Fungus (Mycology) Culture Culture in progress    Narrative:      T9-10 disc space    Gram stain [[id number]] Collected: 12/08/20 1139    Order Status: Completed Specimen: Back Updated: 12/08/20 1652     Gram Stain Result Rare WBC's      No organisms seen    Narrative:      T9-10 disc space     Gram stain [[id number]] Collected: 12/08/20 1139    Order Status: Completed Specimen: Back Updated: 12/08/20 1651     Gram Stain Result Rare WBC's      No organisms seen    Narrative:      T9-10 disc space    Blood culture [[id number]] Collected: 12/02/20 0020    Order Status: Completed Specimen: Blood Updated: 12/07/20 0612     Blood Culture, Routine No growth after 5 days.

## 2020-12-11 NOTE — PLAN OF CARE
12/11/20 1132   Post-Acute Status   Post-Acute Authorization Placement   Post-Acute Placement Status Referrals Sent  ([facility])     JOHN met with Pt at bedside. Discussed therapy recs for rehab and provided list. Addressed questions and reported need to send referrals to obtain accepting options. Pt not agreeable to rehab. She advised she will only return to [facility] or go home. She reported further she has a lot of help at home. Left list for review just in case she changes her mind. Pt choice form signed and placed in chart.     JOHN sent referral via .    Miya Morejon LCSW  Neurocritical Care   [facility]  [id number]

## 2020-12-11 NOTE — PROGRESS NOTES
"[facility]  Adult Nutrition  Progress Note    SUMMARY       Recommendations    Recommendation:   1. Continue regular diet, encourage good PO intake at meals   2. RD to monitor and follow up    Goals: pt to tolerate >85% of EEN/EPN by RD follow up  Nutrition Goal Status: new  Communication of RD Recs: other (comment)(POC)    Reason for Assessment    Reason For Assessment: length of stay  Diagnosis: (osteomyelitis of throracic vertebra)  Relevant Medical History: HTN; CAD: HLD; obesity  Interdisciplinary Rounds: did not attend  General Information Comments: Pt resting in bed, RN in room. Reported good PO intake at this time, PO % of meals. Denies use of ONS and does not want boost. Reported wt loss from UBW of 320 lbs, chart shows wt loss over 6 months. Unable to complete NFPE at this time, pt at risk for malnutrition due to wt loss.  Nutrition Discharge Planning: adequate intake via PO diet    Nutrition Risk Screen    Nutrition Risk Screen: no indicators present    Nutrition/Diet History    Spiritual, Cultural Beliefs, Anglican Practices, Values that Affect Care: no  Food Allergies: NKFA  Factors Affecting Nutritional Intake: None identified at this time    Anthropometrics    Temp: 99 °F (37.2 °C)  Height: 5' 10" (177.8 cm)  Height (inches): 70 in  Weight Method: Bed Scale  Weight: 125.4 kg (276 lb 7.3 oz)  Weight (lb): 276.46 lb  Ideal Body Weight (IBW), Female: 150 lb  % Ideal Body Weight, Female (lb): 184.31 %  BMI (Calculated): 39.7  BMI Grade: 35 - 39.9 - obesity - grade II    Lab/Procedures/Meds    Pertinent Labs Reviewed: reviewed  Pertinent Labs Comments: Phos 2.0  Pertinent Medications Reviewed: reviewed  Pertinent Medications Comments: vancomycin; senna-docusate; heparin; pantoprazole; statin    Estimated/Assessed Needs    Weight Used For Calorie Calculations: 125.4 kg (276 lb 7.3 oz)  Energy Calorie Requirements (kcal): 1844 kcal/d  Energy Need Method: [place]  Protein " Requirements: 100-125 g/day  Weight Used For Protein Calculations: 125.4 kg (276 lb 7.3 oz)  Fluid Requirements (mL): 1 mL/kcal or per MD  RDA Method (mL): 1844      Nutrition Prescription Ordered    Current Diet Order: Regular diet    Evaluation of Received Nutrient/Fluid Intake    I/O: +1/0 L since admit  Energy Calories Required: meeting needs  Protein Required: meeting needs  Fluid Required: meeting needs  Comments: LBM 12/10  Tolerance: tolerating  % Intake of Estimated Energy Needs: 75 - 100 %  % Meal Intake: 75 - 100 %    Nutrition Risk    Level of Risk/Frequency of Follow-up: low     Assessment and Plan    Nutrition Problem  unintentional wt loss    Related to (etiology):   Increased nutrient needs    Signs and Symptoms (as evidenced by):   Wt loss from UBW of 320 lbs, unsure of time frame      Interventions (treatment strategy):  Collaboration of care with other providers    Nutrition Diagnosis Status:   New    Monitor and Evaluation    Food and Nutrient Intake: energy intake, food and beverage intake  Food and Nutrient Adminstration: diet order  Knowledge/Beliefs/Attitudes: food and nutrition knowledge/skill  Anthropometric Measurements: weight, weight change  Biochemical Data, Medical Tests and Procedures: electrolyte and renal panel, gastrointestinal profile, glucose/endocrine profile, inflammatory profile, lipid profile  Nutrition-Focused Physical Findings: overall appearance     Nutrition Follow-Up    RD Follow-up?: Yes

## 2020-12-11 NOTE — PROGRESS NOTES
"[facility]  Neurosurgery  Progress Note    Subjective:     History of Present Illness: 70 y.o. female known to [facility] with PMH of Alzheimer's dz, HTN, HLD, CAD, YOVANI, and morbid obesity who was recently hospitalized x2 for MRSA bacteremia complicated by pneumonia and possible UTI, treated with linozolid x8 days and cipro and discharged 10/1. She was then readmitted to [facility] on 10/8 with severe back pain. MRI revealed T9-10 osteodiscitis and T8-10 epidural phlegmon with associated paraverterbral abscesses. Spine infection likely hematogenous from under-treated bacteremia. Patient had no neurologic deficits on exam, no indications for emergent neurosurgical intervention, plan was made for conservative non-surgical treatment. She underwent needle aspiration of T9-10 disc space on 10/16, cultures were negative although she had already been undergoing antibiotic treatment. Repeat BCx remained no growth and she remained afebrile and neurologically stable. Patient was discharged to [facility] on 10/20 with plan for Vancomycin IV x 8 weeks (estimated end date 12/3).    Patient presents today after interval imaging obtained 11/30 revealed worsening bony destruction, unstable T9 fracture. Transferred for [facility].     Post-Op Info:  Procedure(s) (LRB):  T9-T10 corpectomy, posterior instrumented fusion T7-T12. (N/A)   3 Days Post-Op     Interval History:  NAEON. AFVSS. No episodes of hypotension. Drain output decreased, will remove 1 HV today. H/H improved s/p transfusion of 1U yesterday, patient reports she "feels better." Remains on 3L via nasal cannula, neuro exam stable. Denies weakness, paresthesias, b/b dysfunction, chest pain, SOB.      Medications:  Continuous Infusions:  Scheduled Meds:   bisacodyL  10 mg Rectal Daily    ceftaroline fosamil  600 mg Intravenous Q8H    donepeziL  10 mg Oral QHS    DULoxetine  60 mg Oral Daily    heparin (porcine)  5,000 Units Subcutaneous Q8H    " isosorbide mononitrate  60 mg Oral Daily    lisinopriL  2.5 mg Oral Daily    memantine  10 mg Oral BID    methocarbamoL  500 mg Oral QID    mupirocin   Nasal BID    pantoprazole  40 mg Oral Before breakfast    potassium, sodium phosphates  1 packet Oral Q4H    pravastatin  40 mg Oral QHS    senna-docusate 8.6-50 mg  1 tablet Oral BID    vancomycin (VANCOCIN) IVPB  1,250 mg Intravenous Q24H     PRN Meds:sodium chloride, acetaminophen, albuterol-ipratropium, aluminum-magnesium hydroxide-simethicone, dextrose 50%, dextrose 50%, glucagon (human recombinant), glucose, glucose, glucose, insulin aspart U-100, labetaloL, morphine, ondansetron, oxyCODONE, oxyCODONE, promethazine (PHENERGAN) IVPB, senna-docusate 8.6-50 mg, Pharmacy to dose Vancomycin consult **AND** vancomycin - pharmacy to dose       Objective:     Weight: 125.4 kg (276 lb 7.3 oz)  Body mass index is 39.67 kg/m².  Vital Signs (Most Recent):  Temp: 99 °F (37.2 °C) (12/11/20 0425)  Pulse: (!) 57 (12/11/20 0000)  Resp: 18 (12/11/20 0519)  BP: 123/70 (12/11/20 0000)  SpO2: 96 % (12/11/20 0000) Vital Signs (24h Range):  Temp:  [97.7 °F (36.5 °C)-99 °F (37.2 °C)] 99 °F (37.2 °C)  Pulse:  [55-67] 57  Resp:  [16-60] 18  SpO2:  [75 %-100 %] 96 %  BP: (110-160)/(55-70) 123/70                          Closed/Suction Drain 12/08/20 1239 Right Back Accordion 10 Fr. (Active)   Site Description Unable to view 12/10/20 2219   Dressing Type Biopatch in place 12/10/20 2219   Dressing Status Clean;Dry;Intact 12/10/20 2219   Dressing Intervention Integrity maintained 12/10/20 2219   Drainage Serosanguineous 12/10/20 2219   Status Other (Comment) 12/10/20 2219   Output (mL) 5 mL 12/11/20 0600            Closed/Suction Drain 12/08/20 1239 Left Back Accordion 10 Fr. (Active)   Site Description Unable to view 12/10/20 2219   Dressing Type Biopatch in place 12/10/20 2219   Dressing Status Clean;Dry;Intact 12/10/20 2219   Dressing Intervention Integrity maintained 12/10/20  2219   Drainage Serosanguineous 12/10/20 2219   Status Other (Comment) 12/10/20 2219   Output (mL) 10 mL 12/11/20 0600       Female External Urinary Catheter 12/09/20 1741 (Active)   Skin no redness;no breakdown 12/10/20 2219   Tolerance no signs/symptoms of discomfort 12/10/20 2219   Suction Continuous suction at 70 mmHg 12/10/20 2219   Date of last wick change 12/10/20 12/10/20 1913   Time of last wick change 1913 12/10/20 1913   Output (mL) 50 mL 12/11/20 0600       Neurosurgery Physical Exam    General: well developed, well nourished, no distress.   Head: normocephalic, atraumatic  Neck: No tracheal deviation.   Neurologic: Alert and oriented. Thought content appropriate.  GCS: Motor: 6/Verbal: 5/Eyes: 4 GCS Total: 15  Mental Status: Awake, Alert, Oriented x 4  Language: No aphasia  Speech: No dysarthria  Cranial nerves: face symmetric, tongue midline, CN II-XII grossly intact, EOMI.   Ears: No drainage.   Pulmonary: normal respirations, no signs of respiratory distress, on NC  Abdomen: soft, non-distended, not tender to palpation  Sensory: intact to light touch throughout  Motor Strength: Moves all extremities spontaneously with good tone.  Full strength upper and lower extremities. Proximal LE exam limited by body habitus and pain. No abnormal movements seen.     Strength  Deltoids Triceps Biceps Wrist Extension Wrist Flexion Hand    Upper: R 5/5 5/5 5/5 5/5 5/5 5/5    L 5/5 5/5 5/5 5/5 5/5 5/5     Iliopsoas Quadriceps Knee  Flexion Tibialis  anterior Gastro- cnemius EHL   Lower: R 3/5 3/5 3/5 5/5 5/5 5/5    L 3/5 3/5 3/5 5/5 5/5 5/5     Vascular: No LE edema.   Skin: Skin is warm, dry and intact.    Incisional WV in place with good seal. 2 HV drains in place to full suction.         Significant Labs:  Recent Labs   Lab 12/09/20  1357 12/10/20  0302 12/11/20  0327   GLU 92 109 74    138 138   K 4.1 3.9 4.1    109 106   CO2 26 24 24   BUN 9 9 8   CREATININE 0.8 0.8 0.7   CALCIUM 9.5 9.0 8.9   MG   --  1.9 1.7     Recent Labs   Lab 12/10/20  0302 12/10/20  1217 12/11/20  0327   WBC 10.31 11.16 8.56   HGB 7.9* 8.3* 9.1*   HCT 26.9* 28.2* 30.2*    222 224     No results for input(s): LABPT, INR, APTT in the last 48 hours.  Microbiology Results (last 7 days)     Procedure Component Value Units Date/Time    Fungus culture [[id number]] Collected: 12/08/20 1139    Order Status: Completed Specimen: Back Updated: 12/10/20 1118     Fungus (Mycology) Culture Culture in progress    Narrative:      T9-10 disc space    Fungus culture [[id number]] Collected: 12/08/20 1139    Order Status: Completed Specimen: Back Updated: 12/10/20 1118     Fungus (Mycology) Culture Culture in progress    Narrative:      T9-10 disc space    Aerobic culture [[id number]] Collected: 12/08/20 1139    Order Status: Completed Specimen: Back Updated: 12/09/20 0912     Aerobic Bacterial Culture No growth    Narrative:      T9-10 disc space    Aerobic culture [[id number]] Collected: 12/08/20 1139    Order Status: Completed Specimen: Back Updated: 12/09/20 0912     Aerobic Bacterial Culture No growth    Narrative:      T9-10 disc space    Culture, Anaerobe [[id number]] Collected: 12/08/20 1139    Order Status: Completed Specimen: Back Updated: 12/09/20 0730     Anaerobic Culture Culture in progress    Narrative:      T9-10 disc space    Culture, Anaerobe [[id number]] Collected: 12/08/20 1139    Order Status: Completed Specimen: Back Updated: 12/09/20 0730     Anaerobic Culture Culture in progress    Narrative:      T9-10 disc space    Gram stain [[id number]] Collected: 12/08/20 1139    Order Status: Completed Specimen: Back Updated: 12/08/20 1652     Gram Stain Result Rare WBC's      No organisms seen    Narrative:      T9-10 disc space    Gram stain [[id number]] Collected: 12/08/20 1139    Order Status: Completed Specimen: Back Updated: 12/08/20 1651     Gram Stain Result Rare WBC's      No organisms seen    Narrative:      T9-10 disc space    Blood  culture [[id number]] Collected: 12/02/20 0020    Order Status: Completed Specimen: Blood Updated: 12/07/20 0612     Blood Culture, Routine No growth after 5 days.        Recent Lab Results       12/11/20  0327   12/10/20  1408   12/10/20  1217        Unit Blood Type Code   5100       Unit Expiration   [id number]       Unit Blood Type   O POS       Albumin 1.8         Alkaline Phosphatase 97         ALT <5         Anion Gap 8         AST 11         Baso # 0.01   0.03     Basophil % 0.1   0.3     BILIRUBIN TOTAL 0.3  Comment:  For infants and newborns, interpretation of results should be based  on gestational age, weight and in agreement with clinical  observations.  Premature Infant recommended reference ranges:  Up to 24 hours.............<8.0 mg/dL  Up to 48 hours............<12.0 mg/dL  3-5 days..................<15.0 mg/dL  6-29 days.................<15.0 mg/dL           BUN 8         Calcium 8.9         Chloride 106         CO2 24         CODING SYSTEM   [id number]       Creatinine 0.7         Differential Method Automated   Automated     DISPENSE STATUS   TRANSFUSED       eGFR if  >60.0         eGFR if non  >60.0  Comment:  Calculation used to obtain the estimated glomerular filtration  rate (eGFR) is the CKD-EPI equation.            Eos # 0.1   0.0     Eosinophil % 0.7   0.4     Estimated Avg Glucose     91     Glucose 74         Gran # (ANC) 5.9   7.4     Gran % 68.6   66.0     Group & Rh   O POS       Hematocrit 30.2   28.2     Hemoglobin 9.1   8.3     Hemoglobin A1C External     4.8  Comment:  ADA Screening Guidelines:  5.7-6.4%  Consistent with prediabetes  >or=6.5%  Consistent with diabetes  High levels of fetal hemoglobin interfere with the HbA1C  assay. Heterozygous hemoglobin variants (HbS, HgC, etc)do  not significantly interfere with this assay.   However, presence of multiple variants may affect accuracy.       Immature Grans (Abs) 0.03  Comment:  Mild elevation in  immature granulocytes is non specific and   can be seen in a variety of conditions including stress response,   acute inflammation, trauma and pregnancy. Correlation with other   laboratory and clinical findings is essential.     0.05  Comment:  Mild elevation in immature granulocytes is non specific and   can be seen in a variety of conditions including stress response,   acute inflammation, trauma and pregnancy. Correlation with other   laboratory and clinical findings is essential.       Immature Granulocytes 0.4   0.4     INDIRECT ROBIN   NEG       Lymph # 1.2   1.7     Lymph % 13.8   15.0     Magnesium 1.7         MCH 28.1   27.9     MCHC 30.1   29.4     MCV 93   95     Mono # 1.4   2.0     Mono % 16.4   17.9     MPV 10.6   10.6     nRBC 0   0     Phosphorus 2.0         Platelets 224   222     Potassium 4.1         Product Code   [id number]       PROTEIN TOTAL 5.1         RBC 3.24   2.98     RDW 16.8   17.2     Sodium 138         UNIT NUMBER   [id number]       WBC 8.56   11.16         All pertinent labs from the last 24 hours have been reviewed.    Significant Diagnostics:  I have reviewed all pertinent imaging results/findings within the past 24 hours.    Assessment/Plan:     * Osteomyelitis of thoracic vertebra  70 F with known MRSA bacteremia with thoracic osteomyelitis presenting as transfer from SNF after CT T spine concerning for increased erosion of T9/T10 vertebral bodies as well as bilateral T9 pedicle fractures now s/p T9-10 corpectomy with T7-12 PSIF on 12/8:    Neurologically stable on exam.    - Transferred to floor overnight  - q4h neuro checks  - Post op imaging pending when able to sit unassisted.   - Continue HV drain, will likely remove L HV today given decreased output. Continue WV throughout POD5.   - TLSO brace when up/OOB  - Pain control: scheduled Tylenol and Robaxin, Oxycodone PRN  - Recommend repeat TTE as outpatient given changes from 8/10 - 10/12. Pt may have had NSTEMI.  Endocarditis felt less likely.  - H/o MRSA Bacteremia: Afebrile, no leukocytosis. ESR 97, CRP 12.4.    - ID following, appreciate assistance. Continue ceftaroline, started vancomycin. OR cultures remain NGTD.  - On 3L via nasal cannula, wean as tolerated to maintain O2 sats > 90% on RA  - Anemia: improved s/p transfusion 1U RBC 12/10, now 9.1/30, will ctm and transfuse for Hb < 7 or symptomatic at Hb < 8.  - CAD, s/p Cardiac Stent: MI in 2000 which required stent placements. Pt takes ASA and Plavix, last dose  11/30.   - Hold ASA/Plavix preoperatively  - YOVANI: CPAP qhs at home settings  - HTN: SBP goal <160. HR 60s, home dose metoprolol dc'd by NCC prior to stepdown, will continue to hold. Will resume home dose Imdur and Lisinopril as tolerated.  - Alzheimer's: Continue home memantine and donepezil  - HLD: Continue home pravastatin  - DVT prophylaxis: SHAYLA's, SCD's, SQH.   - Bowel regimen: senna BID     Contact Neurosurgery with any questions, concerns, or neuro changes.    Dispo: pending IPR    MRSA bacteremia            Carmen Metzger PA-C  Neurosurgery  [facility]

## 2020-12-11 NOTE — PT/OT/SLP PROGRESS
Occupational Therapy Co-Treatment    Patient Name:  Martina Betancourt   MRN:  [id number]  Admit Date: 12/1/2020  Admitting Diagnosis:  Osteomyelitis of thoracic vertebra   Length of Stay: 10 days  Recent Surgery: Procedure(s) (LRB):  T9-T10 corpectomy, posterior instrumented fusion T7-T12. (N/A) 3 Days Post-Op    Recommendations:     Discharge Recommendations: rehabilitation facility  Discharge Equipment Recommendations:  other (see comments)(TBD pending furhter mobility)  Barriers to discharge:  Inaccessible home environment, Decreased caregiver support(impaired activity tolerance)    Plan:     Patient to be seen 4 x/week to address the above listed problems via self-care/home management, therapeutic activities, therapeutic exercises, neuromuscular re-education  · Plan of Care Expires: 01/09/20  · Plan of Care Reviewed with: patient    Assessment:   Martina Betancourt is a 70 y.o. female with a medical diagnosis of Osteomyelitis of thoracic vertebra.  She presents with the following performance deficits affecting function: weakness, impaired endurance, impaired sensation, impaired self care skills, impaired functional mobilty, gait instability, impaired balance, decreased coordination, decreased upper extremity function, decreased lower extremity function, decreased safety awareness, pain, decreased ROM, impaired skin, edema, impaired cardiopulmonary response to activity, impaired joint extensibility, orthopedic precautions.  Pt continues to benefit from a collaborative PT/OT/SLP program to improve quality of life and focus on recovery of impairments.     Pt continues to require significant assist with functional mobility and safe completion of ADLs 2* significant back pain and impaired activity tolerance. Pt with noted improvement with EOB balance, sitting for aprx 15 min while completing ADLs with OT assistance. Patient is making progress towards goals, participating well in this session.     Rehab Prognosis: Fair;  "patient would benefit from acute skilled OT services to address these deficits and reach maximum level of function.        Subjective   Communicated with: RN prior to session.  Patient found HOB elevated with bed alarm, blood pressure cuff, PureWick, peripheral IV, SCD, telemetry, hemovac, wound vac upon OT entry to room.    Patient: "it feels like nails stabbing into my side" referring to Rsided back pain    Pain/Comfort:  · Pain Rating 1: 10/10  · Location - Orientation 1: generalized  · Location 1: back  · Pain Addressed 1: Pre-medicate for activity, Reposition, Distraction, Nurse notified(Pt tolerated aprx 15 min EOB before requesting to return to supine 2* pain)  · Pain Rating Post-Intervention 1: other (see comments)(resting comfortably, unable to tolerate advancing HOB beyond 30*)    Objective:   Patient found with: bed alarm, blood pressure cuff, PureWick, peripheral IV, SCD, telemetry, hemovac, wound vac   General Precautions: Standard, Cardiac fall   Orthopedic Precautions:spinal precautions   Braces: TLSO   Oxygen Device: Nasal Cannula 3L  Vitals: BP (!) 147/84   Pulse 62   Temp 98.6 °F (37 °C)   Resp 18   Ht 5' 10" (1.778 m)   Wt 125.4 kg (276 lb 7.3 oz)   SpO2 97%   Breastfeeding No   BMI 39.67 kg/m²     Outcome Measures:  [facility] 6 Click ADL: 11    Cognition:   · Cooperative  · Command following: easily distracted by pain and follows one-step commands  · Communication: clear/fluent    Occupational Performance:  Bed Mobility:    · Patient completed Rolling/Turning to Left with  total assistance and 2-3 persons  · Patient completed Rolling/Turning to Right with total assistance and 2-3 persons  · Scooting to HOB in supine: total assistance and 2-3 persons  · Patient completed Supine to Sit with total assistance and 2 persons on L side of bed  · Scooting anteriorly to EOB to have both feet planted on floor: maximal assistance  · Patient completed Sit to Supine with total assistance, 2 person assist " on L side of bed    Functional Mobility/Transfers:   Static Sitting EOB: CGA, highly distracted by pain   Dynamic Sitting EOB: CGA-Min A, highly distracted by pain   Further mobility deferred 2* impaired tolerance for EOB sitting 2* severity of pain and impaired arousal as patient fatigued sitting EOB with staff assistance.     ADLs performed sitting EOB, sitting aprx 15 min  Activities of Daily Living:  · Feeding:  maximal assistance pt unable to tolerate lifting arm to hold cup, requested therapist assist to drink water seated EOB  · Grooming: maximal assistance washing and combing hair seated EOB. Pt sitting balance CGA. Pt w limited unilateral UE assist 2* pain.   · Upper Body Dressing: Min assistance donning fresh gown. Pt threading arms, pulling gown to shoulders.  Max A donning TLSO.   · Lower Body Dressing: total assistance donning socks  · Toileting: purewick in place upon exit     AMPA 6 Click ADL:  AMPA Total Score: 11    Treatment & Education:  -Pt education on OT role and POC   -Importance of E/OOB activity with staff assistance, emphasis on daily participation  -Multiple self-care tasks and functional mobility completed -- assistance level noted above  -Safety during functional transfer and mobility ensured  -Education provided regarding fit and wear schedule of TLSO, adjustments provided for comfort.  -Education provided regarding spinal precautions for use during functional tasks/mobility/transfers  -Education provided reviewed, questions answered within OT scope of practice.      Patient left HOB elevated with all lines intact, call button in reach, bed alarm on and RN notified    GOALS:   Multidisciplinary Problems     Occupational Therapy Goals        Problem: Occupational Therapy Goal    Goal Priority Disciplines Outcome Interventions   Occupational Therapy Goal     OT, PT/OT Ongoing, Progressing    Description: Goals to be met by: 12/23/20     Patient will increase functional independence  with ADLs by performing:    Grooming while seated with Minimal Assistance.  Sitting at edge of bed x10 minutes with Minimal Assistance.  Rolling to Bilateral with Moderate Assistance.   Supine to sit with Moderate Assistance in preparation for EOB/OOB functional activities.                     Time Tracking:     OT Date of Treatment: 12/11/20  OT Start Time: 0922  OT Stop Time: 1000  OT Total Time (min): 38 min  Additional staff present: Rehab Tech and PT    Co-treatment performed due to patient's multiple medical comorbidities and functional deficits requiring two skilled therapists to appropriately and safely assess patient's strength and endurance while facilitating functional tasks in addition to accommodating for patient's activity tolerance and pain levels.          Billable Minutes:Self Care/Home Management 28  Neuromuscular Re-education 10      ELE Monroe  12/11/2020

## 2020-12-11 NOTE — ASSESSMENT & PLAN NOTE
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     She is afebrile. No leukocytosis. Now s/p I&D, T9-T10 corpectomy and fusion 12/8. Cx are ngtd.  Stable on ceftaroline.  Now in floor bed.    Plan:  1. Continue Ceftaroline 600mg IV q8h   2. Restart Vanc with trough goal 15-20 - when trough 15-20 the dc ceftaroline  3. Plan on Vanc x 6 weeks from sx (12/8) then suppression with Doxycycline  4. Rec DC back to [facility] for rehab and IV abx - rec consult ID there to follow if ID services available.  5. Weekly ESR, CRP, CBC, and CMP and vanc trough on mondays. - team there has managed patient before.  6.  ID will sign off.    Infection: MRSA infection of spine now s/p corpectomy and fusion    Discharge antibiotics:   Vancomycin - dose to be determined    End date of IV antibiotics: 1/19/2021    Weekly outpatient laboratory on Monday or Tuesday while on IV antibiotics.    CBC   CMP   ESR and CRP   Vancomycin trough. Target 15-20      If vancomycin trough is not at target (15-20) prior to discharge, the please perform vancomycin trough before their fourth outpatient dose.    Fax laboratory results to [facility] at [phone] with attn: Neftali Rm PA-C    Outpatient Infectious Diseases clinic follow up will be arranged and found in patient calendar.    Prior to discharge, please ensure the patient's follow-up has been scheduled.  If there is still no follow-up scheduled in Infectious Diseases clinic, please send an EPIC message to Yvrose Mcgill in Infectious Diseases.

## 2020-12-11 NOTE — ASSESSMENT & PLAN NOTE
70 F with known MRSA bacteremia with thoracic osteomyelitis presenting as transfer from SNF after CT T spine concerning for increased erosion of T9/T10 vertebral bodies as well as bilateral T9 pedicle fractures now s/p T9-10 corpectomy with T7-12 PSIF on 12/8:    Neurologically stable on exam.    - Transferred to floor overnight  - q4h neuro checks  - Post op imaging pending when able to sit unassisted.   - Continue HV drain, will likely remove L HV today given decreased output. Continue WV throughout POD5.   - TLSO brace when up/OOB  - Pain control: scheduled Tylenol and Robaxin, Oxycodone PRN  - Recommend repeat TTE as outpatient given changes from 8/10 - 10/12. Pt may have had NSTEMI. Endocarditis felt less likely.  - H/o MRSA Bacteremia: Afebrile, no leukocytosis. ESR 97, CRP 12.4.    - ID following, appreciate assistance. Continue ceftaroline, started vancomycin. OR cultures remain NGTD.  - On 3L via nasal cannula, wean as tolerated to maintain O2 sats > 90% on RA  - Anemia: improved s/p transfusion 1U RBC 12/10, now 9.1/30, will ctm and transfuse for Hb < 7 or symptomatic at Hb < 8.  - CAD, s/p Cardiac Stent: MI in 2000 which required stent placements. Pt takes ASA and Plavix, last dose  11/30.   - Hold ASA/Plavix preoperatively  - YOVANI: CPAP qhs at home settings  - HTN: SBP goal <160. HR 60s, home dose metoprolol dc'd by NCC prior to stepdown, will continue to hold. Will resume home dose Imdur and Lisinopril as tolerated.  - Alzheimer's: Continue home memantine and donepezil  - HLD: Continue home pravastatin  - DVT prophylaxis: SHAYLA's, SCD's, SQH.   - Bowel regimen: senna BID     Contact Neurosurgery with any questions, concerns, or neuro changes.    Dispo: pending IPR

## 2020-12-11 NOTE — PT/OT/SLP PROGRESS
"Physical Therapy Treatment  Co-Tx with OT due to pt's complexity and benefit of 2 skilled therapists to facilitate safe functional mobility at this time      Patient Name:  Martina Betancourt   MRN:  [id number]  Admitting Diagnosis: Osteomyelitis of thoracic vertebra  Recent Surgery: Procedure(s) (LRB):  T9-T10 corpectomy, posterior instrumented fusion T7-T12. (N/A) 3 Days Post-Op    Recommendations:     Discharge Recommendations:  rehabilitation facility   Discharge Equipment Recommendations: (TBD)   Barriers to discharge: Inaccessible home and Decreased caregiver support Increased level of skilled assistance required    Plan:     During this hospitalization, patient to be seen 4 x/week to address the above listed problems via gait training, therapeutic activities, therapeutic exercises, neuromuscular re-education  · Plan of Care Expires:  01/08/21   Plan of Care Reviewed with: patient    This Plan of care has been discussed with the patient who was involved in its development and understands and is in agreement with the identified goals and treatment plan    Subjective     Communicated with nurse (Francois) prior to session.     Patient comments: "I can't really sit up much longer"  Pain/Comfort:  · Pain Rating 1: 10/10  · Location - Side 1: Right  · Location - Orientation 1: generalized  · Location 1: back  · Pain Addressed 1: Pre-medicate for activity, Reposition, Distraction, Nurse notified  · Pain Rating Post-Intervention 1: (No rating provided)    Objective:     Patient found with: bed alarm, hemovac, oxygen, pressure relief boots, PureWick, peripheral IV, SCD, telemetry, wound vac    Patient found sup in bed upon PT entry to room, agreeable to treatment.  NO family present in the room.    General Precautions: Standard, fall, contact(MRSA in blood)   Orthopedic Precautions:spinal precautions   Braces: TLSO       BED MOBILITY (vc's for hand placement sequencing of task):        Rolling to the R:  Total/max A of " 2-3.       Rolling to the L:  Total/max A of 2-3.        Sup > sit at the EOB:  Total A of 3 for trunk and LE's, exiting on the L side.       Sit > sup:  Total A of 3 for trunk and LE's.        Pt was dependent of 3 helpers to scoot to HOB via drawsheet x2 scoot(s).       Scooting hips to EOB with max A                SITTING AT THE EDGE OF THE BED (15 min)   Assistance Level Required: initially mod A then min/CGA for trunk with B UE support   Postural deviations noted: flexed trunk   Encouraged: upright posture, B feet in contact with the floor        TRANSFERS         NP 2* pt with c/o pain    EDUCATION  Patient provided with daily orientation and goals of this PT session.  Pt was educated on Spinal precautions (avoid bending, lifting > 5 lbs and twisting x 6 weeks) with 2/3 recall. They were educated to call for assistance and to transfer with hospital staff only.  Also, pt was educated on the effects of prolonged immobility and the importance of performing OOB activity and exercises to promote healing and reduce recovery time    Whiteboard updated with correct mobility information. RN/PCT notified.  Pt requires total A of 3 to sit at the EOB.    Patient left sup in bed with HOB elevated, with  all lines intact, call button in reach, bed alarm on and nurse notified    AM-PAC 6 CLICK MOBILITY  Turning over in bed (including adjusting bedclothes, sheets and blankets)?: 2  Sitting down on and standing up from a chair with arms (e.g., wheelchair, bedside commode, etc.): 1  Moving from lying on back to sitting on the side of the bed?: 2  Moving to and from a bed to a chair (including a wheelchair)?: 1  Need to walk in hospital room?: 1  Climbing 3-5 steps with a railing?: 1  Basic Mobility Total Score: 8     Assessment:     Martina Betancourt is a 70 y.o. female admitted with a medical diagnosis of Osteomyelitis of thoracic vertebra.  She presents with the following impairments/functional limitations:  weakness,  impaired endurance, impaired sensation, impaired self care skills, impaired functional mobilty, gait instability, impaired balance, decreased coordination, decreased upper extremity function, decreased lower extremity function, decreased safety awareness, pain, impaired coordination, impaired fine motor, impaired skin, edema, orthopedic precautions. requiring significant assistance and verbal cues for bed mob, scooting to the EOB/HOB, static sitting at the EOB to prevent falls due to weakness, pain, fatigue and body habitus.   In light of pt's current functional level and deficits, it is anticipated that pt will need to participate in an intense rehab program consisting of PT and OT in order to achieve full rehab potential to return to previous level of function and roles.  Pt remains motivated to participate in PT session and will cont to benefit from skilled PT intervention.    Rehab Prognosis:  Fair; patient would benefit from acute skilled PT services to address these deficits and reach maximum level of function.      GOALS:   Multidisciplinary Problems     Physical Therapy Goals        Problem: Physical Therapy Goal    Goal Priority Disciplines Outcome Goal Variances Interventions   Physical Therapy Goal     PT, PT/OT Ongoing, Progressing     Description: Goals to be met by: 2020     Patient will increase functional independence with mobility by performin. Supine to sit with MInimal Assistance  2. Sit to supine with MInimal Assistance  3. Rolling to Left and Right with Minimal Assistance.  4. Bed to chair transfer with Maximum Assistance using Slideboard  5. Sitting at edge of bed x8 minutes with Stand-by Assistance  6. Lower extremity exercise program x30 reps per handout, with independence                     Time Tracking:     PT Received On: 20  PT Start Time: 0920     PT Stop Time: 1000  PT Total Time (min): 40 min     Billable Minutes: Therapeutic Activity 40    Treatment Type:  Treatment  PT/PTA: PTA     PTA Visit Number: 2       Viridiana Bermudez PTA.  Pager [phone]    12/11/2020    .

## 2020-12-11 NOTE — PLAN OF CARE
Problem: Adult Inpatient Plan of Care  Goal: Plan of Care Review  Outcome: Ongoing, Progressing  Goal: Patient-Specific Goal (Individualization)  Outcome: Ongoing, Progressing  Flowsheets (Taken 12/11/2020 1635)  Individualized Care Needs: pain management  Anxieties, Fears or Concerns: anxiety over pain  Patient-Specific Goals (Include Timeframe): Successful pain management     Problem: Wound  Goal: Optimal Wound Healing  Outcome: Ongoing, Progressing  Intervention: Promote Effective Wound Healing  Flowsheets (Taken 12/11/2020 1635)  Oral Nutrition Promotion:   rest periods promoted   safe use of adaptive equipment encouraged   medicated  Sleep/Rest Enhancement:   relaxation techniques promoted   regular sleep/rest pattern promoted   noise level reduced   awakenings minimized  Pain Management Interventions:   care clustered   around-the-clock dosing utilized   diversional activity provided   pain management plan reviewed with patient/caregiver   pillow support provided   position adjusted   relaxation techniques promoted     Problem: Fall Injury Risk  Goal: Absence of Fall and Fall-Related Injury  Outcome: Ongoing, Progressing  Intervention: Identify and Manage Contributors to Fall Injury Risk  Flowsheets (Taken 12/11/2020 1635)  Self-Care Promotion:   independence encouraged   BADL personal objects within reach   BADL personal routines maintained   safe use of adaptive equipment encouraged   meal setup provided  Medication Review/Management: medications reviewed  Intervention: Promote Injury-Free Environment  Flowsheets (Taken 12/11/2020 1635)  Safety Promotion/Fall Prevention:   assistive device/personal item within reach   bed alarm set   Fall Risk reviewed with patient/family   diversional activities provided   commode/urinal/bedpan at bedside   high risk medications identified   lighting adjusted   medications reviewed   muscle strengthening facilitated   nonskid shoes/socks when  out of bed   pulse ox   side rails raised x 3   toileting scheduled   instructed to call staff for mobility  Environmental Safety Modification:   assistive device/personal items within reach   clutter free environment maintained   lighting adjusted     POC reviewed with Pt and Pt verbalized understanding of POC. All comments and concerns addressed. Pt progressing towards goals. Bed locked in lowest position with bed alarm set. PICC in place, flushed w/o difficultly, no blood return present. Telemetry and visimobile in place. One accordian drain to full suction and wound vac in place. VS and assessment in flowsheets. No acute events to report this shift. PRN pain management giving around the clock. Pt went for xray of back and US of LE to r/o DVTs. Will continue to monitor for changes to POC and clinical condition.

## 2020-12-11 NOTE — PLAN OF CARE
Patient here on SNF/SWING for long-term antibiotic therapy.  Patient had MRSA abscesses to the spine, aspiration biopsy completed prior to antibiotic therapy at [facility].  Patient on IV Vancomycin.  Last night's trough was elevated, dose being held tonight, trough to be rechecked tomorrow to determine dosing.  Patient up with OT today, refused PT due to not feeling well.  Up to side of bed with maximum assistance.  Norco 10 for pain management.  PICC line to left brachial; dressing change not due until 11/9/20.  Double lumen PICC, however purple port not flushing, red port flushes but does not show blood return.  Purewick in place for prevention of further skin breakdown.  Turn every 2 hours, heel lift boots utilized.  Purewick in place for skin breakdown prevention.  Groins red, but not broken skin, applying barrier cream regularly.  Will continue antibiotic therapy.  Plans to repeat MRI on Monday to determine status of abscesses.    Good nutrition is important when healing from an illness, injury, or surgery. Follow any nutrition recommendations given to you during your hospital stay.  If you were given an oral nutrition supplement while in the hospital, continue to take this supplement at home. You can take it with meals, in-between meals, and/or before bedtime. These supplements can be purchased at most local grocery stores, pharmacies, and chain super-stores.  If you have any questions about your diet or nutrition, call the hospital and ask for the dietitian.

## 2020-12-11 NOTE — PROGRESS NOTES
Therapy with vancomycin complete and/or consult discontinued by provider.  Pharmacy will sign off, please re-consult as needed.    Thank you

## 2020-12-11 NOTE — SUBJECTIVE & OBJECTIVE
"Interval History:  NAEON. AFVSS. No episodes of hypotension. Drain output decreased, will remove 1 HV today. H/H improved s/p transfusion of 1U yesterday, patient reports she "feels better." Remains on 3L via nasal cannula, neuro exam stable. Denies weakness, paresthesias, b/b dysfunction, chest pain, SOB.      Medications:  Continuous Infusions:  Scheduled Meds:   bisacodyL  10 mg Rectal Daily    ceftaroline fosamil  600 mg Intravenous Q8H    donepeziL  10 mg Oral QHS    DULoxetine  60 mg Oral Daily    heparin (porcine)  5,000 Units Subcutaneous Q8H    isosorbide mononitrate  60 mg Oral Daily    lisinopriL  2.5 mg Oral Daily    memantine  10 mg Oral BID    methocarbamoL  500 mg Oral QID    mupirocin   Nasal BID    pantoprazole  40 mg Oral Before breakfast    potassium, sodium phosphates  1 packet Oral Q4H    pravastatin  40 mg Oral QHS    senna-docusate 8.6-50 mg  1 tablet Oral BID    vancomycin (VANCOCIN) IVPB  1,250 mg Intravenous Q24H     PRN Meds:sodium chloride, acetaminophen, albuterol-ipratropium, aluminum-magnesium hydroxide-simethicone, dextrose 50%, dextrose 50%, glucagon (human recombinant), glucose, glucose, glucose, insulin aspart U-100, labetaloL, morphine, ondansetron, oxyCODONE, oxyCODONE, promethazine (PHENERGAN) IVPB, senna-docusate 8.6-50 mg, Pharmacy to dose Vancomycin consult **AND** vancomycin - pharmacy to dose       Objective:     Weight: 125.4 kg (276 lb 7.3 oz)  Body mass index is 39.67 kg/m².  Vital Signs (Most Recent):  Temp: 99 °F (37.2 °C) (12/11/20 0425)  Pulse: (!) 57 (12/11/20 0000)  Resp: 18 (12/11/20 0519)  BP: 123/70 (12/11/20 0000)  SpO2: 96 % (12/11/20 0000) Vital Signs (24h Range):  Temp:  [97.7 °F (36.5 °C)-99 °F (37.2 °C)] 99 °F (37.2 °C)  Pulse:  [55-67] 57  Resp:  [16-60] 18  SpO2:  [75 %-100 %] 96 %  BP: (110-160)/(55-70) 123/70                          Closed/Suction Drain 12/08/20 1239 Right Back Accordion 10 Fr. (Active)   Site Description Unable to view " 12/10/20 2219   Dressing Type Biopatch in place 12/10/20 2219   Dressing Status Clean;Dry;Intact 12/10/20 2219   Dressing Intervention Integrity maintained 12/10/20 2219   Drainage Serosanguineous 12/10/20 2219   Status Other (Comment) 12/10/20 2219   Output (mL) 5 mL 12/11/20 0600            Closed/Suction Drain 12/08/20 1239 Left Back Accordion 10 Fr. (Active)   Site Description Unable to view 12/10/20 2219   Dressing Type Biopatch in place 12/10/20 2219   Dressing Status Clean;Dry;Intact 12/10/20 2219   Dressing Intervention Integrity maintained 12/10/20 2219   Drainage Serosanguineous 12/10/20 2219   Status Other (Comment) 12/10/20 2219   Output (mL) 10 mL 12/11/20 0600       Female External Urinary Catheter 12/09/20 1741 (Active)   Skin no redness;no breakdown 12/10/20 2219   Tolerance no signs/symptoms of discomfort 12/10/20 2219   Suction Continuous suction at 70 mmHg 12/10/20 2219   Date of last wick change 12/10/20 12/10/20 1913   Time of last wick change 1913 12/10/20 1913   Output (mL) 50 mL 12/11/20 0600       Neurosurgery Physical Exam    General: well developed, well nourished, no distress.   Head: normocephalic, atraumatic  Neck: No tracheal deviation.   Neurologic: Alert and oriented. Thought content appropriate.  GCS: Motor: 6/Verbal: 5/Eyes: 4 GCS Total: 15  Mental Status: Awake, Alert, Oriented x 4  Language: No aphasia  Speech: No dysarthria  Cranial nerves: face symmetric, tongue midline, CN II-XII grossly intact, EOMI.   Ears: No drainage.   Pulmonary: normal respirations, no signs of respiratory distress, on NC  Abdomen: soft, non-distended, not tender to palpation  Sensory: intact to light touch throughout  Motor Strength: Moves all extremities spontaneously with good tone.  Full strength upper and lower extremities. Proximal LE exam limited by body habitus and pain. No abnormal movements seen.     Strength  Deltoids Triceps Biceps Wrist Extension Wrist Flexion Hand    Upper: R 5/5 5/5  5/5 5/5 5/5 5/5    L 5/5 5/5 5/5 5/5 5/5 5/5     Iliopsoas Quadriceps Knee  Flexion Tibialis  anterior Gastro- cnemius EHL   Lower: R 3/5 3/5 3/5 5/5 5/5 5/5    L 3/5 3/5 3/5 5/5 5/5 5/5     Vascular: No LE edema.   Skin: Skin is warm, dry and intact.    Incisional WV in place with good seal. 2 HV drains in place to full suction.         Significant Labs:  Recent Labs   Lab 12/09/20  1357 12/10/20  0302 12/11/20  0327   GLU 92 109 74    138 138   K 4.1 3.9 4.1    109 106   CO2 26 24 24   BUN 9 9 8   CREATININE 0.8 0.8 0.7   CALCIUM 9.5 9.0 8.9   MG  --  1.9 1.7     Recent Labs   Lab 12/10/20  0302 12/10/20  1217 12/11/20  0327   WBC 10.31 11.16 8.56   HGB 7.9* 8.3* 9.1*   HCT 26.9* 28.2* 30.2*    222 224     No results for input(s): LABPT, INR, APTT in the last 48 hours.  Microbiology Results (last 7 days)     Procedure Component Value Units Date/Time    Fungus culture [[id number]] Collected: 12/08/20 1139    Order Status: Completed Specimen: Back Updated: 12/10/20 1118     Fungus (Mycology) Culture Culture in progress    Narrative:      T9-10 disc space    Fungus culture [[id number]] Collected: 12/08/20 1139    Order Status: Completed Specimen: Back Updated: 12/10/20 1118     Fungus (Mycology) Culture Culture in progress    Narrative:      T9-10 disc space    Aerobic culture [[id number]] Collected: 12/08/20 1139    Order Status: Completed Specimen: Back Updated: 12/09/20 0912     Aerobic Bacterial Culture No growth    Narrative:      T9-10 disc space    Aerobic culture [[id number]] Collected: 12/08/20 1139    Order Status: Completed Specimen: Back Updated: 12/09/20 0912     Aerobic Bacterial Culture No growth    Narrative:      T9-10 disc space    Culture, Anaerobe [[id number]] Collected: 12/08/20 1139    Order Status: Completed Specimen: Back Updated: 12/09/20 0730     Anaerobic Culture Culture in progress    Narrative:      T9-10 disc space    Culture, Anaerobe [[id number]] Collected: 12/08/20 1130     Order Status: Completed Specimen: Back Updated: 12/09/20 0730     Anaerobic Culture Culture in progress    Narrative:      T9-10 disc space    Gram stain [[id number]] Collected: 12/08/20 1139    Order Status: Completed Specimen: Back Updated: 12/08/20 1652     Gram Stain Result Rare WBC's      No organisms seen    Narrative:      T9-10 disc space    Gram stain [[id number]] Collected: 12/08/20 1139    Order Status: Completed Specimen: Back Updated: 12/08/20 1651     Gram Stain Result Rare WBC's      No organisms seen    Narrative:      T9-10 disc space    Blood culture [[id number]] Collected: 12/02/20 0020    Order Status: Completed Specimen: Blood Updated: 12/07/20 0612     Blood Culture, Routine No growth after 5 days.        Recent Lab Results       12/11/20  0327   12/10/20  1408   12/10/20  1217        Unit Blood Type Code   5100       Unit Expiration   [id number]       Unit Blood Type   O POS       Albumin 1.8         Alkaline Phosphatase 97         ALT <5         Anion Gap 8         AST 11         Baso # 0.01   0.03     Basophil % 0.1   0.3     BILIRUBIN TOTAL 0.3  Comment:  For infants and newborns, interpretation of results should be based  on gestational age, weight and in agreement with clinical  observations.  Premature Infant recommended reference ranges:  Up to 24 hours.............<8.0 mg/dL  Up to 48 hours............<12.0 mg/dL  3-5 days..................<15.0 mg/dL  6-29 days.................<15.0 mg/dL           BUN 8         Calcium 8.9         Chloride 106         CO2 24         CODING SYSTEM   [id number]       Creatinine 0.7         Differential Method Automated   Automated     DISPENSE STATUS   TRANSFUSED       eGFR if  >60.0         eGFR if non  >60.0  Comment:  Calculation used to obtain the estimated glomerular filtration  rate (eGFR) is the CKD-EPI equation.            Eos # 0.1   0.0     Eosinophil % 0.7   0.4     Estimated Avg Glucose     91     Glucose 74          Gran # (ANC) 5.9   7.4     Gran % 68.6   66.0     Group & Rh   O POS       Hematocrit 30.2   28.2     Hemoglobin 9.1   8.3     Hemoglobin A1C External     4.8  Comment:  ADA Screening Guidelines:  5.7-6.4%  Consistent with prediabetes  >or=6.5%  Consistent with diabetes  High levels of fetal hemoglobin interfere with the HbA1C  assay. Heterozygous hemoglobin variants (HbS, HgC, etc)do  not significantly interfere with this assay.   However, presence of multiple variants may affect accuracy.       Immature Grans (Abs) 0.03  Comment:  Mild elevation in immature granulocytes is non specific and   can be seen in a variety of conditions including stress response,   acute inflammation, trauma and pregnancy. Correlation with other   laboratory and clinical findings is essential.     0.05  Comment:  Mild elevation in immature granulocytes is non specific and   can be seen in a variety of conditions including stress response,   acute inflammation, trauma and pregnancy. Correlation with other   laboratory and clinical findings is essential.       Immature Granulocytes 0.4   0.4     INDIRECT ROBIN   NEG       Lymph # 1.2   1.7     Lymph % 13.8   15.0     Magnesium 1.7         MCH 28.1   27.9     MCHC 30.1   29.4     MCV 93   95     Mono # 1.4   2.0     Mono % 16.4   17.9     MPV 10.6   10.6     nRBC 0   0     Phosphorus 2.0         Platelets 224   222     Potassium 4.1         Product Code   [id number]       PROTEIN TOTAL 5.1         RBC 3.24   2.98     RDW 16.8   17.2     Sodium 138         UNIT NUMBER   [id number]       WBC 8.56   11.16         All pertinent labs from the last 24 hours have been reviewed.    Significant Diagnostics:  I have reviewed all pertinent imaging results/findings within the past 24 hours.

## 2020-12-11 NOTE — PROGRESS NOTES
[facility]  Infectious Disease  Progress Note    Patient Name: Martina Betancourt  MRN: [id number]  Admission Date: 12/1/2020  Length of Stay: 10 days  Attending Physician: Everardo Gongora MD  Primary Care Provider: Joe Fuller Iii, MD    Isolation Status: No active isolations  Assessment/Plan:      * Osteomyelitis of thoracic vertebra     70 year old female with hx of MRSA bacteremia, known T9-10 osteo discitis, T8-10 epidural phlegmon with associated paraverterbral abscesses (NSGY consulted at that time - did not recommend acute surgical intervention) whom has been on 6-8 weeks of IV Vancomycin now admitted with worsening erosive changes of vertebral bodes, pedicular fractures and persistent intervertebral disc space abscess and epidural phlegmon.     Vancomycin discontinued and Ceftaroline started. Blood cx are negative. She is afebrile. No leukocytosis. Now s/p I&D, T9-T10 corpectomy and fusion 12/8. Cx are ngtd.    Stable on vanc and ceftaroline.  Mobilizing    Plan:  1. Continue Ceftaroline as would prefer expanded coverage though suspect likely not an abx failure but failure of source control as suspected issue.  2. DC Vancomycin  3. Follow surgical cultures but will plan on ceftaroline x 6 weeks from sx then suppression  4. Anticipate 6 weeks of IV antibiotics   5. ID will follow.      Anticipated Disposition: TBD    Thank you for your consult. I will follow-up with patient. Please contact us if you have any additional questions.    LAYO Kennedy  Infectious Disease  [facility]    Subjective:     Principal Problem:Osteomyelitis of thoracic vertebra    HPI: 71 yo female with a history of MRSA bactermia and dementia known to ID service and under the treatment for presumed MRSA osteodiscitis at T9-10.  She had been seen in Oct 2020 after having been admitted to and OSH and found to have MRSA bacteremia that was treated as inpt and dc'd on Zyvox to complete 7 d.   She then presented with excruciating back pain and found to have osteodiscitis at T9-10 and BL ST abscesses.  Blood cultures were no growth at that time and sabrina underwent aspiration at T9-10 and it was no growth.  She was discharged on Vancomycin to complete 6-8 weeks EOC 12/3/20 and she has been at [facility]  She had followed up in ID clinic on  and back pain was improving. Repeat MRI  was done and showed:  1. Altered signal intensity changes at T9/T10 with evidence of erosive focus about the opposing endplates and evidence of enhancing phlegmonous formation identified in the central component of the disc with central low signal intensity compatible with osteomyelitis/discitis.  Vertical dimensions of the area of interest cause extreme erosion along the anterior portion of the vertebral bodies of interest without evidence of any significant paraspinal component.  2. No significant soft tissue component.    NSGY fu was arranged.      FU CT scan was planned and done on  showin.Findings compatible with T9-10 discitis/osteomyelitis with surrounding perivertebral soft tissue thickening.  Evaluation for underlying abscess an epidural process limited given the lack of intravenous contrast.  If there is concern for underlying epidural process, consider further evaluation with an MRI of the thoracic spine.  No retropulsion is noted.  2. Interval development of fracture deformity through the bilateral T9 pedicles and posterior superior cortical margin of the T9 vertebral body    MRI C/T/L done showin. Findings in keeping with known T9-T10 and osteomyelitis/discitis with associated intervertebral disc space abscess, epidural phlegmon with associated mass effect on the thoracic spinal cord and paravertebral phlegmon as discussed above.  Findings do not appear significantly improved when compared to most recent exam of 2020.   2. Subtle T2/STIR signal hyperintensity involving the T3-T4  intervertebral disc space, increased in conspicuity from prior examination. This could reflect an additional region of discitis/osteomyelitis and attention on follow-up exam is advised.   3. No evidence to suggest osteomyelitis/discitis in the cervical or lumbar spine at this time.  Multilevel degenerative change of the cervical and lumbar spine.   4. Moderate size hiatal hernia and left pleural effusion.     She was admitted and surgery planned.  Her back pain has worsened over the last 2-3 weeks but she denies systemic sing of infection.  Blood cultures are NGTD and crp 12.4.  She remains on vanc and denies PICC problems.  The patient denies any recent fever, chills, or sweats.      Interval History: No AEON. Afebrile and WBC WNL.  Blood cultures finalized negative.  Back pain not well controlled.  The patient denies any recent fever, chills, or sweats.    Review of Systems   Constitutional: Negative for activity change, chills, diaphoresis and fever.   Respiratory: Negative for cough, shortness of breath and wheezing.    Cardiovascular: Negative for chest pain.   Gastrointestinal: Negative for abdominal pain, constipation, diarrhea, nausea and vomiting.   Genitourinary: Negative for dysuria, frequency and urgency.   Musculoskeletal: Positive for back pain.   Neurological: Negative for dizziness.   Hematological: Does not bruise/bleed easily.     Objective:     Vital Signs (Most Recent):  Temp: 98.8 °F (37.1 °C) (12/10/20 0700)  Pulse: 64 (12/10/20 0900)  Resp: (!) 21 (12/10/20 0900)  BP: 138/64 (12/10/20 0900)  SpO2: 99 % (12/10/20 0900) Vital Signs (24h Range):  Temp:  [98.2 °F (36.8 °C)-98.8 °F (37.1 °C)] 98.8 °F (37.1 °C)  Pulse:  [57-66] 64  Resp:  [10-35] 21  SpO2:  [89 %-100 %] 99 %  BP: ()/(40-65) 138/64  Arterial Line BP: ()/() 42/30     Weight: 125.4 kg (276 lb 7.3 oz)  Body mass index is 39.67 kg/m².    Estimated Creatinine Clearance: 91.6 mL/min (based on SCr of 0.8  mg/dL).    Physical Exam  Constitutional:       General: She is not in acute distress.     Appearance: She is well-developed. She is obese. She is not ill-appearing or diaphoretic.       HENT:      Head: Normocephalic and atraumatic.      Nose: Nose normal.   Cardiovascular:      Rate and Rhythm: Normal rate and regular rhythm.      Heart sounds: Normal heart sounds. No murmur. No friction rub. No gallop.    Pulmonary:      Effort: Pulmonary effort is normal. No respiratory distress.      Breath sounds: Normal breath sounds. No stridor.      Comments: o2 via NC  Abdominal:      General: There is no distension.      Palpations: Abdomen is soft.      Tenderness: There is no abdominal tenderness.   Genitourinary:     Comments: purewick  Musculoskeletal:      Comments: Surgical drains bilaterally with serosanguinous output   Skin:     General: Skin is warm and dry.      Comments: PICC c/d/i   Neurological:      Mental Status: She is alert and oriented to person, place, and time.   Psychiatric:         Behavior: Behavior normal.         Significant Labs:   Blood Culture:   Recent Labs   Lab 09/23/20  1433 09/26/20  1452 10/08/20  2344 10/08/20  2345 12/02/20  0020   LABBLOO Gram stain dale bottle: Gram positive cocci in clusters resembling Staph   Results called to and read back by:Parag Galvan RN 09/24/2020  11:30  Gram stain aer bottle: Gram positive cocci in clusters resembling Staph   Positive results previously called 09/24/2020  13:51  METHICILLIN RESISTANT STAPHYLOCOCCUS AUREUS  ID consult required at [facility],[place] and [facility] locations.  For susceptibility see order #[id number]  * No growth after 5 days. No growth after 5 days. No growth after 5 days. No growth after 5 days.     CBC:   Recent Labs   Lab 12/08/20  1216 12/09/20  0406 12/10/20  0302   WBC  --  11.37 10.31   HGB  --  8.9* 7.9*   HCT 30* 29.0* 26.9*   PLT  --  258 213     CMP:   Recent Labs   Lab 12/09/20  0406 12/09/20  1357 12/10/20  0302    * 136 138   K 3.9 4.1 3.9    105 109   CO2 25 26 24   * 92 109   BUN 10 9 9   CREATININE 0.9 0.8 0.8   CALCIUM 9.4 9.5 9.0   PROT 5.5*  --  4.8*   ALBUMIN 2.2*  --  1.8*   BILITOT 0.2  --  0.2   ALKPHOS 99  --  88   AST 14  --  10   ALT 7*  --  <5*   ANIONGAP 4* 5* 5*   EGFRNONAA >60.0 >60.0 >60.0     Wound Culture:   Recent Labs   Lab 10/16/20  1228 12/08/20  1139   LABAERO No growth No growth  No growth     All pertinent labs within the past 24 hours have been reviewed.    Significant Imaging: I have reviewed all pertinent imaging results/findings within the past 24 hours.   CT Interoperative Limited [[id number]] Resulted: 12/08/20 1823   Order Status: Sent Updated: 12/08/20 1824   SURG FL Surgery Fluoro Usage [[id number]] Resulted: 12/08/20 1322   Order Status: Completed Updated: 12/08/20 1322   Narrative:     See OP Notes for results.     IMPRESSION: See OP Notes for results.             This procedure was auto-finalized by: Virtual Radiologist       CT Cervical Spine Without Contrast [[id number]] Resulted: 12/04/20 0626   Order Status: Completed Updated: 12/04/20 0628   Narrative:     EXAMINATION:   CT CERVICAL SPINE WITHOUT CONTRAST     CLINICAL HISTORY:   evaluate for fracture; prevertebral edema at C5-6 on MRI;     TECHNIQUE:   Low dose axial images, sagittal and coronal reformations were performed though the cervical spine.  Contrast was not administered.     COMPARISON:   MRI cervical, thoracic and lumbar spine 12/02/2020, cervical spine MRI 06/01/2015     FINDINGS:   Cervical vertebral body alignment appears to be within normal limits.  Vertebral body heights appear maintained and similar to prior MRI of 2015.  No definite CT evidence to suggest acute cervical spine fracture. The facet joints articulate appropriately. No significant prevertebral soft tissue swelling noting the cervical esophagus is slightly thickened. There is multilevel intervertebral disc height loss and extensive  multilevel degenerative change of the visualized cervical spine primarily consisting of multiple posterior disc osteophyte complexes, uncovertebral joint DJD and facet arthropathy.  C4-C5 and C5-C6 levels where there are varying degrees of moderate/severe neural foraminal narrowing bilaterally.  The visualized skull base is intact.  The visualized lung apices demonstrate bilateral pleural fluid.    Impression:       1. No CT evidence to suggest acute cervical spine fracture.  Clinical correlation and further evaluation as warranted.   2. Multilevel degenerative change of the cervical spine most pronounced at the C4-C5 and C5-C6 levels.       Electronically signed by: Christy Mcgee MD   Date: 12/04/2020   Time: 06:26   MRI SPINE CERVICAL-THORACIC-LUMBAR W W/O CONTRAST (XPD) [[id number]] (Abnormal) Resulted: 12/02/20 0626   Order Status: Completed Updated: 12/02/20 0628   Narrative:     EXAMINATION:   MRI SPINE CERVICAL-THORACIC-LUMBAR W W/O CONTRAST (XPD)     CLINICAL HISTORY:   worsening osteo;     TECHNIQUE:   Multiplanar, multisequence MR images of the cervical, thoracic and lumbar spine were performed before and after the administration of 10 cc gadolinium based IV contrast.     COMPARISON:   *CT thoracic spine 11/30/2020 10/13/2020   *MRI thoracic spine 11/09/2020, 10/12/2020   *CT lumbar spine 10/13/2020   *MRI lumbar spine 10/12/2020     FINDINGS:   Cervical spine:     Please note image quality is degraded by patient motion artifact, most notably sagittal postcontrast images.  Cervical vertebral bodies demonstrate no evidence to suggest acute fracture or abnormal infiltrating marrow replacement process.  There is multilevel intervertebral disc desiccation and disc height loss most pronounced at the C5-C6 level.  The craniocervical junction is within normal limits.  The cervical spinal cord is normal in caliber and signal intensity.  There is multilevel degenerative change of the cervical spine consisting of  uncovertebral joint DJD, posterior disc osteophyte complexes and facet arthropathy.  Degenerative change most pronounced at the C5-C6 level where there is effacement of the anterior thecal sac and moderate/severe bilateral neural foraminal narrowing (right greater than left).  Following the administration of IV contrast, no pathologic foci of enhancement are appreciated.  Incidentally visualized soft tissue structures demonstrate a presumed sebaceous cyst in the right posterior extra thoracic soft tissues.     Thoracic:     There is redemonstration of abnormal marrow signal intensity and postcontrast enhancement involving the T9-T10 level consistent with patient's known osteomyelitis/discitis.  There is abnormal fluid signal and peripheral enhancement involving the T9-T10 intervertebral disc space concerning for associated disc space abscess.  There is abnormal signal intensity and postcontrast enhancement involving the anterior and posterior epidural spaces from the T8 through T10 level in keeping with associated epidural phlegmon.  There is associated mass effect on the thoracic spinal cord without definite abnormal intramedullary cord signal intensity.  Phlegmonous change appears to involve the T8-T9 and T9-T10 neural foramina.  There is associated paravertebral phlegmonous change also at these levels.  There is subtle T2/STIR signal hyperintensity involving the T3-T4 intervertebral disc space noting this appears increased in conspicuity from prior examination.  This could reflect an additional region of discitis/osteomyelitis and attention on follow-up exam is advised.  Thoracic vertebral body alignment is stable and there is redemonstration of multilevel degenerative change.  Incidentally visualized intrathoracic structures demonstrate a moderate-sized hiatal hernia and left pleural fluid.     Lumbar spine:     There is grade 2 anterolisthesis of L5 on S1 secondary to bilateral L5 pars defects, unchanged from  prior examination.  There is grade 1 anterolisthesis of L3 on L4, also unchanged.  Lumbar vertebral bodies demonstrate no evidence of acute fracture or infiltrating marrow replacement process.  There is multilevel intervertebral disc desiccation.  The conus medullaris is normal in morphology and terminates at the T12-L1 level.  Following the administration of IV contrast, no pathologic foci of enhancement are appreciated.  There is multilevel degenerative change of the lumbar spine similar to most recent MRI of 10/12/2020.  There is a subcentimeter right renal cortical T2 hyperintensity, likely a cyst.  There is fatty atrophy of the paraspinal musculature.    Impression:       1. Findings in keeping with known T9-T10 and osteomyelitis/discitis with associated intervertebral disc space abscess, epidural phlegmon with associated mass effect on the thoracic spinal cord and paravertebral phlegmon as discussed above.  Findings do not appear significantly improved when compared to most recent exam of 11/09/2020.   2. Subtle T2/STIR signal hyperintensity involving the T3-T4 intervertebral disc space, increased in conspicuity from prior examination. This could reflect an additional region of discitis/osteomyelitis and attention on follow-up exam is advised.   3. No evidence to suggest osteomyelitis/discitis in the cervical or lumbar spine at this time.  Multilevel degenerative change of the cervical and lumbar spine.   4. Moderate size hiatal hernia and left pleural effusion.   This report was flagged in Epic as abnormal.     Electronically signed by resident: Leif Glover   Date: 12/02/2020   Time: 04:58     Electronically signed by: Christy Mcgee MD   Date: 12/02/2020   Time: 06:26   Imaging History    2020  Date Procedure Name Status Accession Number Location   12/03/20 03:16 PM CT Cervical Spine Without Contrast Final [id number] [facility]   12/02/20 04:03 AM MRI SPINE CERVICAL-THORACIC-LUMBAR W W/O CONTRAST (XPD) Final [id number]  [facility]   11/30/20 02:10 PM CT Thoracic Spine Without Contrast Final [id number] [facility]   11/09/20 12:08 PM MRI Thoracic Spine W WO Cont Final [id number] [facility]

## 2020-12-11 NOTE — CARE UPDATE
Discussed antibiotic regimen with ID. Will plan to discharge on Vancomycin, will discontinue ceftaroline once therapeutic on vancomycin. Tentative plan for discharge back to [facility] pending pain control, medical stability, and therapeutic on vancomycin.    Carmen Metzger PA-C  Neurosurgery  [facility]

## 2020-12-11 NOTE — PLAN OF CARE
Problem: Physical Therapy Goal  Goal: Physical Therapy Goal  Description: Goals to be met by: 2020     Patient will increase functional independence with mobility by performin. Supine to sit with MInimal Assistance  2. Sit to supine with MInimal Assistance  3. Rolling to Left and Right with Minimal Assistance.  4. Bed to chair transfer with Maximum Assistance using Slideboard  5. Sitting at edge of bed x8 minutes with Stand-by Assistance  6. Lower extremity exercise program x30 reps per handout, with independence    Outcome: Ongoing, Progressing     Discharge Recommendations: Rehab    Pt requires total A of 3 to sit at the EOB.    Goals remain appropriate.     Viridiana Bermudez, PTA.   [phone]   2020

## 2020-12-11 NOTE — CONSULTS
[facility]  Infectious Disease  Consult Note    Patient Name: Martina Betancourt  MRN: [id number]  Admission Date: 12/1/2020  Hospital Length of Stay: 10 days  Attending Physician: Everardo Gongora MD  Primary Care Provider: Joe Fuller Iii, MD     Isolation Status: No active isolations    Patient information was obtained from patient and ER records.      Consults  Assessment/Plan:     * Osteomyelitis of thoracic vertebra     70 year old female with hx of MRSA bacteremia, known T9-10 osteo discitis, T8-10 epidural phlegmon with associated paraverterbral abscesses (NSGY consulted at that time - did not recommend acute surgical intervention) whom has been on 6-8 weeks of IV Vancomycin now admitted with worsening erosive changes of vertebral bodes, pedicular fractures and persistent intervertebral disc space abscess and epidural phlegmon.     She is afebrile. No leukocytosis. Now s/p I&D, T9-T10 corpectomy and fusion 12/8. Cx are ngtd.  Stable on ceftaroline.  Now in floor bed.    Plan:  1. Continue Ceftaroline 600mg IV q8h   2. Restart Vanc with trough goal 15-20 - when trough 15-20 the dc ceftaroline  3. Plan on Vanc x 6 weeks from sx (12/8) then suppression with Doxycycline  4. Rec DC back to [place] for rehab and IV abx - rec consult ID there to follow if ID services available.  5. Weekly ESR, CRP, CBC, and CMP and vanc trough on mondays. - team there has managed patient before.  6.  ID will sign off.    Infection: MRSA infection of spine now s/p corpectomy and fusion    Discharge antibiotics:   Vancomycin - dose to be determined    End date of IV antibiotics: 1/19/2021    Weekly outpatient laboratory on Monday or Tuesday while on IV antibiotics.    CBC   CMP   ESR and CRP   Vancomycin trough. Target 15-20      If vancomycin trough is not at target (15-20) prior to discharge, the please perform vancomycin trough before their fourth outpatient dose.    Fax laboratory results to [facility]  at [phone] with attn: Neftali Rm PA-C    Outpatient Infectious Diseases clinic follow up will be arranged and found in patient calendar.    Prior to discharge, please ensure the patient's follow-up has been scheduled.  If there is still no follow-up scheduled in Infectious Diseases clinic, please send an EPIC message to Yvrose Mcgill in Infectious Diseases.                    Thank you for your consult. I will sign off. Please contact us if you have any additional questions.    LAYO Kennedy  Infectious Disease  [facility]    Subjective:     Principal Problem: Osteomyelitis of thoracic vertebra    HPI: 69 yo female with a history of MRSA bactermia and dementia known to ID service and under the treatment for presumed MRSA osteodiscitis at T9-10.  She had been seen in Oct 2020 after having been admitted to and OSH and found to have MRSA bacteremia that was treated as inpt and dc'd on Zyvox to complete 7 d.  She then presented with excruciating back pain and found to have osteodiscitis at T9-10 and BL ST abscesses.  Blood cultures were no growth at that time and [facility] underwent aspiration at T9-10 and it was no growth.  She was discharged on Vancomycin to complete 6-8 weeks EOC 12/3/20 and she has been at [facility]  She had followed up in ID clinic on 11/18 and back pain was improving. Repeat MRI 11/9 was done and showed:  1. Altered signal intensity changes at T9/T10 with evidence of erosive focus about the opposing endplates and evidence of enhancing phlegmonous formation identified in the central component of the disc with central low signal intensity compatible with osteomyelitis/discitis.  Vertical dimensions of the area of interest cause extreme erosion along the anterior portion of the vertebral bodies of interest without evidence of any significant paraspinal component.  2. No significant soft tissue component.    NSGY fu was arranged.      FU CT scan was planned  and done on  showin.Findings compatible with T9-10 discitis/osteomyelitis with surrounding perivertebral soft tissue thickening.  Evaluation for underlying abscess an epidural process limited given the lack of intravenous contrast.  If there is concern for underlying epidural process, consider further evaluation with an MRI of the thoracic spine.  No retropulsion is noted.  2. Interval development of fracture deformity through the bilateral T9 pedicles and posterior superior cortical margin of the T9 vertebral body    MRI C/T/L done showin. Findings in keeping with known T9-T10 and osteomyelitis/discitis with associated intervertebral disc space abscess, epidural phlegmon with associated mass effect on the thoracic spinal cord and paravertebral phlegmon as discussed above.  Findings do not appear significantly improved when compared to most recent exam of 2020.   2. Subtle T2/STIR signal hyperintensity involving the T3-T4 intervertebral disc space, increased in conspicuity from prior examination. This could reflect an additional region of discitis/osteomyelitis and attention on follow-up exam is advised.   3. No evidence to suggest osteomyelitis/discitis in the cervical or lumbar spine at this time.  Multilevel degenerative change of the cervical and lumbar spine.   4. Moderate size hiatal hernia and left pleural effusion.     She was admitted and surgery planned.  Her back pain has worsened over the last 2-3 weeks but she denies systemic sing of infection.  Blood cultures are NGTD and crp 12.4.  She remains on vanc and denies PICC problems.  The patient denies any recent fever, chills, or sweats.        Interval History: No AEON. Afebrile and WBC WNL. Back pain improving  The patient denies any recent fever, chills, or sweats.    Review of Systems   Constitutional: Negative for activity change, chills, diaphoresis and fever.   Respiratory: Negative for cough, shortness of breath and wheezing.     Cardiovascular: Negative for chest pain.   Gastrointestinal: Negative for abdominal pain, constipation, diarrhea, nausea and vomiting.   Genitourinary: Negative for dysuria, frequency and urgency.   Musculoskeletal: Positive for back pain.   Neurological: Negative for dizziness.   Hematological: Does not bruise/bleed easily.     Objective:     Vital Signs (Most Recent):  Temp: 98.6 °F (37 °C) (12/11/20 0900)  Pulse: (!) 59 (12/11/20 0900)  Resp: 20 (12/11/20 0900)  BP: 119/68 (12/11/20 0900)  SpO2: 95 % (12/11/20 0900) Vital Signs (24h Range):  Temp:  [97.7 °F (36.5 °C)-99 °F (37.2 °C)] 98.6 °F (37 °C)  Pulse:  [55-67] 59  Resp:  [16-60] 20  SpO2:  [75 %-100 %] 95 %  BP: (110-160)/(56-75) 119/68     Weight: 125.4 kg (276 lb 7.3 oz)  Body mass index is 39.67 kg/m².    Estimated Creatinine Clearance: 104.7 mL/min (based on SCr of 0.7 mg/dL).    Physical Exam  Constitutional:       General: She is not in acute distress.     Appearance: She is well-developed. She is obese. She is not ill-appearing or diaphoretic.       HENT:      Head: Normocephalic and atraumatic.      Nose: Nose normal.   Cardiovascular:      Rate and Rhythm: Normal rate and regular rhythm.      Heart sounds: Normal heart sounds. No murmur. No friction rub. No gallop.    Pulmonary:      Effort: Pulmonary effort is normal. No respiratory distress.      Breath sounds: Normal breath sounds. No stridor.      Comments: o2 via NC  Abdominal:      General: There is no distension.      Palpations: Abdomen is soft.      Tenderness: There is no abdominal tenderness.   Genitourinary:     Comments: purewick  Musculoskeletal:      Comments: Surgical drains bilaterally with serosanguinous output   Skin:     General: Skin is warm and dry.      Comments: PICC c/d/i   Neurological:      Mental Status: She is alert and oriented to person, place, and time.   Psychiatric:         Behavior: Behavior normal.         Significant Labs:   Blood Culture:   Recent Labs   Lab  09/23/20  1433 09/26/20  1452 10/08/20  2344 10/08/20  2345 12/02/20  0020   LABBLOO Gram stain dale bottle: Gram positive cocci in clusters resembling Staph   Results called to and read back by:Parag Galvan RN 09/24/2020  11:30  Gram stain aer bottle: Gram positive cocci in clusters resembling Staph   Positive results previously called 09/24/2020  13:51  METHICILLIN RESISTANT STAPHYLOCOCCUS AUREUS  ID consult required at [facility] and [facility] locations.  For susceptibility see order #[id number]  * No growth after 5 days. No growth after 5 days. No growth after 5 days. No growth after 5 days.     CBC:   Recent Labs   Lab 12/10/20  0302 12/10/20  1217 12/11/20  0327   WBC 10.31 11.16 8.56   HGB 7.9* 8.3* 9.1*   HCT 26.9* 28.2* 30.2*    222 224     CMP:   Recent Labs   Lab 12/09/20  1357 12/10/20  0302 12/11/20  0327    138 138   K 4.1 3.9 4.1    109 106   CO2 26 24 24   GLU 92 109 74   BUN 9 9 8   CREATININE 0.8 0.8 0.7   CALCIUM 9.5 9.0 8.9   PROT  --  4.8* 5.1*   ALBUMIN  --  1.8* 1.8*   BILITOT  --  0.2 0.3   ALKPHOS  --  88 97   AST  --  10 11   ALT  --  <5* <5*   ANIONGAP 5* 5* 8   EGFRNONAA >60.0 >60.0 >60.0     Wound Culture:   Recent Labs   Lab 10/16/20  1228 12/08/20  1139   LABAERO No growth No growth  No growth     All pertinent labs within the past 24 hours have been reviewed.    Significant Imaging: I have reviewed all pertinent imaging results/findings within the past 24 hours.   CT Interoperative Limited [[id number]] Resulted: 12/08/20 1823   Order Status: Sent Updated: 12/08/20 1824   SURG FL Surgery Fluoro Usage [[id number]] Resulted: 12/08/20 1322   Order Status: Completed Updated: 12/08/20 1322   Narrative:     See OP Notes for results.     IMPRESSION: See OP Notes for results.             This procedure was auto-finalized by: Virtual Radiologist       CT Cervical Spine Without Contrast [[id number]] Resulted: 12/04/20 0626   Order Status: Completed Updated: 12/04/20  0628   Narrative:     EXAMINATION:   CT CERVICAL SPINE WITHOUT CONTRAST     CLINICAL HISTORY:   evaluate for fracture; prevertebral edema at C5-6 on MRI;     TECHNIQUE:   Low dose axial images, sagittal and coronal reformations were performed though the cervical spine.  Contrast was not administered.     COMPARISON:   MRI cervical, thoracic and lumbar spine 12/02/2020, cervical spine MRI 06/01/2015     FINDINGS:   Cervical vertebral body alignment appears to be within normal limits.  Vertebral body heights appear maintained and similar to prior MRI of 2015.  No definite CT evidence to suggest acute cervical spine fracture. The facet joints articulate appropriately. No significant prevertebral soft tissue swelling noting the cervical esophagus is slightly thickened. There is multilevel intervertebral disc height loss and extensive multilevel degenerative change of the visualized cervical spine primarily consisting of multiple posterior disc osteophyte complexes, uncovertebral joint DJD and facet arthropathy.  C4-C5 and C5-C6 levels where there are varying degrees of moderate/severe neural foraminal narrowing bilaterally.  The visualized skull base is intact.  The visualized lung apices demonstrate bilateral pleural fluid.    Impression:       1. No CT evidence to suggest acute cervical spine fracture.  Clinical correlation and further evaluation as warranted.   2. Multilevel degenerative change of the cervical spine most pronounced at the C4-C5 and C5-C6 levels.       Electronically signed by: Christy Mcgee MD   Date: 12/04/2020   Time: 06:26   MRI SPINE CERVICAL-THORACIC-LUMBAR W W/O CONTRAST (XPD) [[id number]] (Abnormal) Resulted: 12/02/20 0626   Order Status: Completed Updated: 12/02/20 0628   Narrative:     EXAMINATION:   MRI SPINE CERVICAL-THORACIC-LUMBAR W W/O CONTRAST (XPD)     CLINICAL HISTORY:   worsening osteo;     TECHNIQUE:   Multiplanar, multisequence MR images of the cervical, thoracic and lumbar spine  were performed before and after the administration of 10 cc gadolinium based IV contrast.     COMPARISON:   *CT thoracic spine 11/30/2020 10/13/2020   *MRI thoracic spine 11/09/2020, 10/12/2020   *CT lumbar spine 10/13/2020   *MRI lumbar spine 10/12/2020     FINDINGS:   Cervical spine:     Please note image quality is degraded by patient motion artifact, most notably sagittal postcontrast images.  Cervical vertebral bodies demonstrate no evidence to suggest acute fracture or abnormal infiltrating marrow replacement process.  There is multilevel intervertebral disc desiccation and disc height loss most pronounced at the C5-C6 level.  The craniocervical junction is within normal limits.  The cervical spinal cord is normal in caliber and signal intensity.  There is multilevel degenerative change of the cervical spine consisting of uncovertebral joint DJD, posterior disc osteophyte complexes and facet arthropathy.  Degenerative change most pronounced at the C5-C6 level where there is effacement of the anterior thecal sac and moderate/severe bilateral neural foraminal narrowing (right greater than left).  Following the administration of IV contrast, no pathologic foci of enhancement are appreciated.  Incidentally visualized soft tissue structures demonstrate a presumed sebaceous cyst in the right posterior extra thoracic soft tissues.     Thoracic:     There is redemonstration of abnormal marrow signal intensity and postcontrast enhancement involving the T9-T10 level consistent with patient's known osteomyelitis/discitis.  There is abnormal fluid signal and peripheral enhancement involving the T9-T10 intervertebral disc space concerning for associated disc space abscess.  There is abnormal signal intensity and postcontrast enhancement involving the anterior and posterior epidural spaces from the T8 through T10 level in keeping with associated epidural phlegmon.  There is associated mass effect on the thoracic spinal cord  without definite abnormal intramedullary cord signal intensity.  Phlegmonous change appears to involve the T8-T9 and T9-T10 neural foramina.  There is associated paravertebral phlegmonous change also at these levels.  There is subtle T2/STIR signal hyperintensity involving the T3-T4 intervertebral disc space noting this appears increased in conspicuity from prior examination.  This could reflect an additional region of discitis/osteomyelitis and attention on follow-up exam is advised.  Thoracic vertebral body alignment is stable and there is redemonstration of multilevel degenerative change.  Incidentally visualized intrathoracic structures demonstrate a moderate-sized hiatal hernia and left pleural fluid.     Lumbar spine:     There is grade 2 anterolisthesis of L5 on S1 secondary to bilateral L5 pars defects, unchanged from prior examination.  There is grade 1 anterolisthesis of L3 on L4, also unchanged.  Lumbar vertebral bodies demonstrate no evidence of acute fracture or infiltrating marrow replacement process.  There is multilevel intervertebral disc desiccation.  The conus medullaris is normal in morphology and terminates at the T12-L1 level.  Following the administration of IV contrast, no pathologic foci of enhancement are appreciated.  There is multilevel degenerative change of the lumbar spine similar to most recent MRI of 10/12/2020.  There is a subcentimeter right renal cortical T2 hyperintensity, likely a cyst.  There is fatty atrophy of the paraspinal musculature.    Impression:       1. Findings in keeping with known T9-T10 and osteomyelitis/discitis with associated intervertebral disc space abscess, epidural phlegmon with associated mass effect on the thoracic spinal cord and paravertebral phlegmon as discussed above.  Findings do not appear significantly improved when compared to most recent exam of 11/09/2020.   2. Subtle T2/STIR signal hyperintensity involving the T3-T4 intervertebral disc space,  increased in conspicuity from prior examination. This could reflect an additional region of discitis/osteomyelitis and attention on follow-up exam is advised.   3. No evidence to suggest osteomyelitis/discitis in the cervical or lumbar spine at this time.  Multilevel degenerative change of the cervical and lumbar spine.   4. Moderate size hiatal hernia and left pleural effusion.   This report was flagged in Epic as abnormal.     Electronically signed by resident: Leif Glover   Date: 12/02/2020   Time: 04:58     Electronically signed by: Christy Mcgee MD   Date: 12/02/2020   Time: 06:26   Imaging History    2020  Date Procedure Name Status Accession Number Location   12/03/20 03:16 PM CT Cervical Spine Without Contrast Final [id number] [facility]   12/02/20 04:03 AM MRI SPINE CERVICAL-THORACIC-LUMBAR W W/O CONTRAST (XPD) Final [id number] [facility]   11/30/20 02:10 PM CT Thoracic Spine Without Contrast Final [id number] [facility]   11/09/20 12:08 PM MRI Thoracic Spine W WO Cont Final [id number] [facility]

## 2020-12-12 LAB
ALBUMIN SERPL BCP-MCNC: 1.7 G/DL (ref 3.5–5.2)
ALP SERPL-CCNC: 94 U/L (ref 55–135)
ALT SERPL W/O P-5'-P-CCNC: 5 U/L (ref 10–44)
ANION GAP SERPL CALC-SCNC: 8 MMOL/L (ref 8–16)
AST SERPL-CCNC: 8 U/L (ref 10–40)
BACTERIA SPEC AEROBE CULT: NO GROWTH
BILIRUB SERPL-MCNC: 0.3 MG/DL (ref 0.1–1)
BUN SERPL-MCNC: 7 MG/DL (ref 8–23)
CALCIUM SERPL-MCNC: 9.3 MG/DL (ref 8.7–10.5)
CHLORIDE SERPL-SCNC: 105 MMOL/L (ref 95–110)
CO2 SERPL-SCNC: 26 MMOL/L (ref 23–29)
CREAT SERPL-MCNC: 0.6 MG/DL (ref 0.5–1.4)
EST. GFR  (AFRICAN AMERICAN): >60 ML/MIN/1.73 M^2
EST. GFR  (NON AFRICAN AMERICAN): >60 ML/MIN/1.73 M^2
GLUCOSE SERPL-MCNC: 73 MG/DL (ref 70–110)
MAGNESIUM SERPL-MCNC: 1.7 MG/DL (ref 1.6–2.6)
PHOSPHATE SERPL-MCNC: 3.1 MG/DL (ref 2.7–4.5)
POTASSIUM SERPL-SCNC: 3.6 MMOL/L (ref 3.5–5.1)
PROT SERPL-MCNC: 4.9 G/DL (ref 6–8.4)
SODIUM SERPL-SCNC: 139 MMOL/L (ref 136–145)

## 2020-12-12 PROCEDURE — 94761 N-INVAS EAR/PLS OXIMETRY MLT: CPT

## 2020-12-12 PROCEDURE — 84100 ASSAY OF PHOSPHORUS: CPT

## 2020-12-12 PROCEDURE — 25000003 PHARM REV CODE 250: Performed by: STUDENT IN AN ORGANIZED HEALTH CARE EDUCATION/TRAINING PROGRAM

## 2020-12-12 PROCEDURE — 25000003 PHARM REV CODE 250: Performed by: PHYSICIAN ASSISTANT

## 2020-12-12 PROCEDURE — 36415 COLL VENOUS BLD VENIPUNCTURE: CPT

## 2020-12-12 PROCEDURE — 63600175 PHARM REV CODE 636 W HCPCS: Performed by: PHYSICIAN ASSISTANT

## 2020-12-12 PROCEDURE — 11000001 HC ACUTE MED/SURG PRIVATE ROOM

## 2020-12-12 PROCEDURE — 83735 ASSAY OF MAGNESIUM: CPT

## 2020-12-12 PROCEDURE — 25000003 PHARM REV CODE 250: Performed by: NEUROLOGICAL SURGERY

## 2020-12-12 PROCEDURE — 80053 COMPREHEN METABOLIC PANEL: CPT

## 2020-12-12 PROCEDURE — 63600175 PHARM REV CODE 636 W HCPCS: Performed by: NEUROLOGICAL SURGERY

## 2020-12-12 PROCEDURE — 27000221 HC OXYGEN, UP TO 24 HOURS

## 2020-12-12 PROCEDURE — 63600175 PHARM REV CODE 636 W HCPCS: Mod: JG | Performed by: PHYSICIAN ASSISTANT

## 2020-12-12 RX ADMIN — MEMANTINE HYDROCHLORIDE 10 MG: 10 TABLET ORAL at 08:12

## 2020-12-12 RX ADMIN — METHOCARBAMOL 750 MG: 750 TABLET ORAL at 12:12

## 2020-12-12 RX ADMIN — PRAVASTATIN SODIUM 40 MG: 40 TABLET ORAL at 08:12

## 2020-12-12 RX ADMIN — BISACODYL 10 MG: 10 SUPPOSITORY RECTAL at 09:12

## 2020-12-12 RX ADMIN — VANCOMYCIN HYDROCHLORIDE 1250 MG: 10 INJECTION, POWDER, LYOPHILIZED, FOR SOLUTION INTRAVENOUS at 06:12

## 2020-12-12 RX ADMIN — DULOXETINE HYDROCHLORIDE 60 MG: 60 CAPSULE, DELAYED RELEASE ORAL at 08:12

## 2020-12-12 RX ADMIN — OXYCODONE HYDROCHLORIDE 5 MG: 5 TABLET ORAL at 08:12

## 2020-12-12 RX ADMIN — MUPIROCIN: 20 OINTMENT TOPICAL at 09:12

## 2020-12-12 RX ADMIN — HEPARIN SODIUM 5000 UNITS: 5000 INJECTION INTRAVENOUS; SUBCUTANEOUS at 09:12

## 2020-12-12 RX ADMIN — ISOSORBIDE MONONITRATE 60 MG: 30 TABLET, EXTENDED RELEASE ORAL at 08:12

## 2020-12-12 RX ADMIN — OXYCODONE HYDROCHLORIDE 10 MG: 10 TABLET ORAL at 05:12

## 2020-12-12 RX ADMIN — LISINOPRIL 2.5 MG: 2.5 TABLET ORAL at 09:12

## 2020-12-12 RX ADMIN — HEPARIN SODIUM 5000 UNITS: 5000 INJECTION INTRAVENOUS; SUBCUTANEOUS at 03:12

## 2020-12-12 RX ADMIN — MUPIROCIN: 20 OINTMENT TOPICAL at 08:12

## 2020-12-12 RX ADMIN — METHOCARBAMOL 750 MG: 750 TABLET ORAL at 09:12

## 2020-12-12 RX ADMIN — METHOCARBAMOL 750 MG: 750 TABLET ORAL at 08:12

## 2020-12-12 RX ADMIN — ACETAMINOPHEN 650 MG: 325 TABLET ORAL at 12:12

## 2020-12-12 RX ADMIN — DONEPEZIL HYDROCHLORIDE 10 MG: 10 TABLET ORAL at 08:12

## 2020-12-12 RX ADMIN — SENNOSIDES AND DOCUSATE SODIUM 1 TABLET: 8.6; 5 TABLET ORAL at 09:12

## 2020-12-12 RX ADMIN — METHOCARBAMOL 750 MG: 750 TABLET ORAL at 06:12

## 2020-12-12 RX ADMIN — SENNOSIDES AND DOCUSATE SODIUM 1 TABLET: 8.6; 5 TABLET ORAL at 08:12

## 2020-12-12 RX ADMIN — ACETAMINOPHEN 650 MG: 325 TABLET ORAL at 05:12

## 2020-12-12 RX ADMIN — HEPARIN SODIUM 5000 UNITS: 5000 INJECTION INTRAVENOUS; SUBCUTANEOUS at 05:12

## 2020-12-12 RX ADMIN — CEFTAROLINE FOSAMIL 600 MG: 600 POWDER, FOR SOLUTION INTRAVENOUS at 05:12

## 2020-12-12 RX ADMIN — ACETAMINOPHEN 650 MG: 325 TABLET ORAL at 06:12

## 2020-12-12 RX ADMIN — CEFTAROLINE FOSAMIL 600 MG: 600 POWDER, FOR SOLUTION INTRAVENOUS at 02:12

## 2020-12-12 RX ADMIN — PANTOPRAZOLE SODIUM 40 MG: 40 TABLET, DELAYED RELEASE ORAL at 05:12

## 2020-12-12 RX ADMIN — OXYCODONE HYDROCHLORIDE 10 MG: 10 TABLET ORAL at 08:12

## 2020-12-12 RX ADMIN — CEFTAROLINE FOSAMIL 600 MG: 600 POWDER, FOR SOLUTION INTRAVENOUS at 08:12

## 2020-12-12 NOTE — PLAN OF CARE
POC reviewed with pt. Pt verbalized understanding. Questions and concerns addressed. No acute events overnight. Pt AAOx4 and denies any numbness and tingling. Pt on 3L NC. Pt c/o pain during shift. Prn pain medication given per MAR. Fall/safety precautions maintained. Bed locked and in lowest position with call light within reach. See flowsheets for full assessment and VS information.      Problem: Adult Inpatient Plan of Care  Goal: Plan of Care Review  Outcome: Ongoing, Progressing  Goal: Optimal Comfort and Wellbeing  Outcome: Ongoing, Progressing     Problem: Fall Injury Risk  Goal: Absence of Fall and Fall-Related Injury  Outcome: Ongoing, Progressing     Problem: Skin Injury Risk Increased  Goal: Skin Health and Integrity  Outcome: Ongoing, Progressing

## 2020-12-12 NOTE — NURSING
Pt down to x-ray with transportation. Upon returning, accordian drain was detached at Y-site in tubing. Sutures intact at insertion site. NSX team was notified, instructed to wipe both ends with alcohol wipes and reattach. VSS. Neuro asx at baseline. WCTM.

## 2020-12-12 NOTE — PROGRESS NOTES
[facility]  Neurosurgery  Progress Note    Subjective:     History of Present Illness: 70 y.o. female known to [facility] with PMH of Alzheimer's dz, HTN, HLD, CAD, YOVANI, and morbid obesity who was recently hospitalized x2 for MRSA bacteremia complicated by pneumonia and possible UTI, treated with linozolid x8 days and cipro and discharged 10/1. She was then readmitted to [facility] on 10/8 with severe back pain. MRI revealed T9-10 osteodiscitis and T8-10 epidural phlegmon with associated paraverterbral abscesses. Spine infection likely hematogenous from under-treated bacteremia. Patient had no neurologic deficits on exam, no indications for emergent neurosurgical intervention, plan was made for conservative non-surgical treatment. She underwent needle aspiration of T9-10 disc space on 10/16, cultures were negative although she had already been undergoing antibiotic treatment. Repeat BCx remained no growth and she remained afebrile and neurologically stable. Patient was discharged to [facility] on 10/20 with plan for Vancomycin IV x 8 weeks (estimated end date 12/3).    Patient presents today after interval imaging obtained 11/30 revealed worsening bony destruction, unstable T9 fracture. Transferred for [facility].     Post-Op Info:  Procedure(s) (LRB):  T9-T10 corpectomy, posterior instrumented fusion T7-T12. (N/A)   4 Days Post-Op         Medications:  Continuous Infusions:    Scheduled Meds:   acetaminophen  650 mg Oral Q6H    bisacodyL  10 mg Rectal Daily    ceftaroline fosamil  600 mg Intravenous Q8H    donepeziL  10 mg Oral QHS    DULoxetine  60 mg Oral Daily    heparin (porcine)  5,000 Units Subcutaneous Q8H    isosorbide mononitrate  60 mg Oral Daily    lisinopriL  2.5 mg Oral Daily    memantine  10 mg Oral BID    methocarbamoL  750 mg Oral QID    mupirocin   Nasal BID    pantoprazole  40 mg Oral Before breakfast    pravastatin  40 mg Oral QHS    senna-docusate 8.6-50 mg  1  tablet Oral BID    vancomycin (VANCOCIN) IVPB  1,250 mg Intravenous Q24H     PRN Meds:sodium chloride, albuterol-ipratropium, aluminum-magnesium hydroxide-simethicone, dextrose 50%, dextrose 50%, glucagon (human recombinant), glucose, glucose, glucose, insulin aspart U-100, labetaloL, morphine, ondansetron, oxyCODONE, oxyCODONE, promethazine (PHENERGAN) IVPB, senna-docusate 8.6-50 mg, Pharmacy to dose Vancomycin consult **AND** vancomycin - pharmacy to dose     Review of Systems    Objective:     Weight: 125.4 kg (276 lb 7.3 oz)  Body mass index is 39.67 kg/m².  Vital Signs (Most Recent):  Temp: 98.7 °F (37.1 °C) (12/12/20 1259)  Pulse: 60 (12/12/20 1259)  Resp: 18 (12/12/20 1259)  BP: (!) 104/55 (12/12/20 1259)  SpO2: (!) 93 % (12/12/20 1259) Vital Signs (24h Range):  Temp:  [97.4 °F (36.3 °C)-98.7 °F (37.1 °C)] 98.7 °F (37.1 °C)  Pulse:  [60-62] 60  Resp:  [14-20] 18  SpO2:  [93 %-97 %] 93 %  BP: (104-130)/(55-72) 104/55     Date 12/12/20 0700 - 12/13/20 0659   Shift 9737-8317 2073-0397 9904-7805 24 Hour Total   INTAKE   Shift Total(mL/kg)       OUTPUT   Other 0   0   Shift Total(mL/kg) 0(0)   0(0)   Weight (kg) 125.4 125.4 125.4 125.4                        Closed/Suction Drain 12/08/20 1239 Right Back Accordion 10 Fr. (Active)   Site Description Unable to view 12/10/20 0700   Dressing Type Biopatch in place 12/10/20 0700   Dressing Status Clean;Dry;Intact 12/10/20 0700   Dressing Intervention Integrity maintained 12/10/20 0700   Drainage Serosanguineous 12/10/20 0700   Status To bulb suction 12/10/20 0700   Output (mL) 35 mL 12/10/20 0605            Closed/Suction Drain 12/08/20 1239 Left Back Accordion 10 Fr. (Active)   Site Description Unable to view 12/10/20 0700   Dressing Type Biopatch in place 12/10/20 0700   Dressing Status Clean;Dry;Intact 12/10/20 0700   Dressing Intervention Integrity maintained 12/10/20 0700   Drainage Serosanguineous 12/10/20 0700   Status To bulb suction 12/10/20 0700   Output (mL)  50 mL 12/10/20 0605       Female External Urinary Catheter 12/09/20 1741 (Active)   Skin no redness;no breakdown 12/10/20 0700   Tolerance no signs/symptoms of discomfort 12/10/20 0700   Suction Continuous suction at 40 mmHg 12/10/20 0700   Date of last wick change 12/10/20 12/10/20 0700   Output (mL) 700 mL 12/10/20 0505       Neurosurgery Physical Exam     Awake, alert, NAD  Brace in place  EOMI, PERRL  FS in BUE  Pain limited proximal leg weakness 3+/5, L>R  4/5 at knee bilaterally  5/5 distally  SILT    Significant Labs:  Recent Labs   Lab 12/11/20  0327 12/12/20  0451   GLU 74 73    139   K 4.1 3.6    105   CO2 24 26   BUN 8 7*   CREATININE 0.7 0.6   CALCIUM 8.9 9.3   MG 1.7 1.7     Recent Labs   Lab 12/11/20  0327   WBC 8.56   HGB 9.1*   HCT 30.2*        No results for input(s): LABPT, INR, APTT in the last 48 hours.  Microbiology Results (last 7 days)     Procedure Component Value Units Date/Time    Aerobic culture [[id number]] Collected: 12/08/20 1139    Order Status: Completed Specimen: Back Updated: 12/12/20 1007     Aerobic Bacterial Culture No growth    Narrative:      T9-10 disc space    Aerobic culture [[id number]] Collected: 12/08/20 1139    Order Status: Completed Specimen: Back Updated: 12/11/20 1047     Aerobic Bacterial Culture No growth    Narrative:      T9-10 disc space    Culture, Anaerobe [[id number]] Collected: 12/08/20 1139    Order Status: Completed Specimen: Back Updated: 12/11/20 0915     Anaerobic Culture Culture in progress    Narrative:      T9-10 disc space    Culture, Anaerobe [[id number]] Collected: 12/08/20 1139    Order Status: Completed Specimen: Back Updated: 12/11/20 0915     Anaerobic Culture Culture in progress    Narrative:      T9-10 disc space    Fungus culture [[id number]] Collected: 12/08/20 1139    Order Status: Completed Specimen: Back Updated: 12/10/20 1118     Fungus (Mycology) Culture Culture in progress    Narrative:      T9-10 disc space    Fungus  culture [[id number]] Collected: 12/08/20 1139    Order Status: Completed Specimen: Back Updated: 12/10/20 1118     Fungus (Mycology) Culture Culture in progress    Narrative:      T9-10 disc space    Gram stain [[id number]] Collected: 12/08/20 1139    Order Status: Completed Specimen: Back Updated: 12/08/20 1652     Gram Stain Result Rare WBC's      No organisms seen    Narrative:      T9-10 disc space    Gram stain [[id number]] Collected: 12/08/20 1139    Order Status: Completed Specimen: Back Updated: 12/08/20 1651     Gram Stain Result Rare WBC's      No organisms seen    Narrative:      T9-10 disc space    Blood culture [[id number]] Collected: 12/02/20 0020    Order Status: Completed Specimen: Blood Updated: 12/07/20 0612     Blood Culture, Routine No growth after 5 days.          Significant Diagnostics:  No results found in the last 24 hours.      Assessment/Plan:     * Osteomyelitis of thoracic vertebra  70 F with known MRSA bacteremia with thoracic osteomyelitis presenting as transfer from SNF after CT T spine concerning for increased erosion of T9/T10 vertebral bodies as well as bilateral T9 pedicle fractures now s/p T9-10 corpectomy with T7-12 PSIF on 12/8:    Neurologically stable on exam.      - q4h neuro checks  - Post op imaging pending when able to sit unassisted.   - Continue HV drain, will likely remove L HV today given decreased output. Continue WV throughout POD5.   - TLSO brace when up/OOB  - Pain control: scheduled Tylenol and Robaxin, Oxycodone PRN  - Recommend repeat TTE as outpatient given changes from 8/10 - 10/12. Pt may have had NSTEMI. Endocarditis felt less likely.  - H/o MRSA Bacteremia: Afebrile, no leukocytosis.    - ID following, appreciate assistance. Continue ceftaroline, started vancomycin. OR cultures remain NGTD.  - On 3L via nasal cannula, wean as tolerated to maintain O2 sats > 90% on RA  - Anemia: improved s/p transfusion 1U RBC 12/10, now 9.1/30, will ctm and transfuse for Hb <  7 or symptomatic at Hb < 8.  - CAD, s/p Cardiac Stent: MI in 2000 which required stent placements. Pt takes ASA and Plavix, last dose  11/30.   - Hold ASA/Plavix preoperatively  - YOVANI: CPAP qhs at home settings  - HTN: SBP goal <160. HR 60s, home dose metoprolol dc'd by NCC prior to stepdown, will continue to hold. Will resume home dose Imdur and Lisinopril as tolerated.  - Alzheimer's: Continue home memantine and donepezil  - HLD: Continue home pravastatin  - DVT prophylaxis: SHAYLA's, SCD's, SQH.   - Bowel regimen: senna BID     Contact Neurosurgery with any questions, concerns, or neuro changes.    Dispo: pending IPR    MRSA bacteremia            Sher Miller MD  Neurosurgery  [facility]

## 2020-12-12 NOTE — SUBJECTIVE & OBJECTIVE
Medications:  Continuous Infusions:    Scheduled Meds:   acetaminophen  650 mg Oral Q6H    bisacodyL  10 mg Rectal Daily    ceftaroline fosamil  600 mg Intravenous Q8H    donepeziL  10 mg Oral QHS    DULoxetine  60 mg Oral Daily    heparin (porcine)  5,000 Units Subcutaneous Q8H    isosorbide mononitrate  60 mg Oral Daily    lisinopriL  2.5 mg Oral Daily    memantine  10 mg Oral BID    methocarbamoL  750 mg Oral QID    mupirocin   Nasal BID    pantoprazole  40 mg Oral Before breakfast    pravastatin  40 mg Oral QHS    senna-docusate 8.6-50 mg  1 tablet Oral BID    vancomycin (VANCOCIN) IVPB  1,250 mg Intravenous Q24H     PRN Meds:sodium chloride, albuterol-ipratropium, aluminum-magnesium hydroxide-simethicone, dextrose 50%, dextrose 50%, glucagon (human recombinant), glucose, glucose, glucose, insulin aspart U-100, labetaloL, morphine, ondansetron, oxyCODONE, oxyCODONE, promethazine (PHENERGAN) IVPB, senna-docusate 8.6-50 mg, Pharmacy to dose Vancomycin consult **AND** vancomycin - pharmacy to dose     Review of Systems    Objective:     Weight: 125.4 kg (276 lb 7.3 oz)  Body mass index is 39.67 kg/m².  Vital Signs (Most Recent):  Temp: 98.7 °F (37.1 °C) (12/12/20 1259)  Pulse: 60 (12/12/20 1259)  Resp: 18 (12/12/20 1259)  BP: (!) 104/55 (12/12/20 1259)  SpO2: (!) 93 % (12/12/20 1259) Vital Signs (24h Range):  Temp:  [97.4 °F (36.3 °C)-98.7 °F (37.1 °C)] 98.7 °F (37.1 °C)  Pulse:  [60-62] 60  Resp:  [14-20] 18  SpO2:  [93 %-97 %] 93 %  BP: (104-130)/(55-72) 104/55     Date 12/12/20 0700 - 12/13/20 0659   Shift 4831-4777 5377-4464 5615-2417 24 Hour Total   INTAKE   Shift Total(mL/kg)       OUTPUT   Other 0   0   Shift Total(mL/kg) 0(0)   0(0)   Weight (kg) 125.4 125.4 125.4 125.4                        Closed/Suction Drain 12/08/20 1239 Right Back Accordion 10 Fr. (Active)   Site Description Unable to view 12/10/20 0700   Dressing Type Biopatch in place 12/10/20 0700   Dressing Status  Clean;Dry;Intact 12/10/20 0700   Dressing Intervention Integrity maintained 12/10/20 0700   Drainage Serosanguineous 12/10/20 0700   Status To bulb suction 12/10/20 0700   Output (mL) 35 mL 12/10/20 0605            Closed/Suction Drain 12/08/20 1239 Left Back Accordion 10 Fr. (Active)   Site Description Unable to view 12/10/20 0700   Dressing Type Biopatch in place 12/10/20 0700   Dressing Status Clean;Dry;Intact 12/10/20 0700   Dressing Intervention Integrity maintained 12/10/20 0700   Drainage Serosanguineous 12/10/20 0700   Status To bulb suction 12/10/20 0700   Output (mL) 50 mL 12/10/20 0605       Female External Urinary Catheter 12/09/20 1741 (Active)   Skin no redness;no breakdown 12/10/20 0700   Tolerance no signs/symptoms of discomfort 12/10/20 0700   Suction Continuous suction at 40 mmHg 12/10/20 0700   Date of last wick change 12/10/20 12/10/20 0700   Output (mL) 700 mL 12/10/20 0505       Neurosurgery Physical Exam     Awake, alert, NAD  Brace in place  EOMI, PERRL  FS in BUE  Pain limited proximal leg weakness 3+/5, L>R  4/5 at knee bilaterally  5/5 distally  SILT    Significant Labs:  Recent Labs   Lab 12/11/20  0327 12/12/20  0451   GLU 74 73    139   K 4.1 3.6    105   CO2 24 26   BUN 8 7*   CREATININE 0.7 0.6   CALCIUM 8.9 9.3   MG 1.7 1.7     Recent Labs   Lab 12/11/20  0327   WBC 8.56   HGB 9.1*   HCT 30.2*        No results for input(s): LABPT, INR, APTT in the last 48 hours.  Microbiology Results (last 7 days)     Procedure Component Value Units Date/Time    Aerobic culture [[id number]] Collected: 12/08/20 1139    Order Status: Completed Specimen: Back Updated: 12/12/20 1007     Aerobic Bacterial Culture No growth    Narrative:      T9-10 disc space    Aerobic culture [[id number]] Collected: 12/08/20 1139    Order Status: Completed Specimen: Back Updated: 12/11/20 1047     Aerobic Bacterial Culture No growth    Narrative:      T9-10 disc space    Culture, Anaerobe [[id number]]  Collected: 12/08/20 1139    Order Status: Completed Specimen: Back Updated: 12/11/20 0915     Anaerobic Culture Culture in progress    Narrative:      T9-10 disc space    Culture, Anaerobe [[id number]] Collected: 12/08/20 1139    Order Status: Completed Specimen: Back Updated: 12/11/20 0915     Anaerobic Culture Culture in progress    Narrative:      T9-10 disc space    Fungus culture [[id number]] Collected: 12/08/20 1139    Order Status: Completed Specimen: Back Updated: 12/10/20 1118     Fungus (Mycology) Culture Culture in progress    Narrative:      T9-10 disc space    Fungus culture [[id number]] Collected: 12/08/20 1139    Order Status: Completed Specimen: Back Updated: 12/10/20 1118     Fungus (Mycology) Culture Culture in progress    Narrative:      T9-10 disc space    Gram stain [[id number]] Collected: 12/08/20 1139    Order Status: Completed Specimen: Back Updated: 12/08/20 1652     Gram Stain Result Rare WBC's      No organisms seen    Narrative:      T9-10 disc space    Gram stain [[id number]] Collected: 12/08/20 1139    Order Status: Completed Specimen: Back Updated: 12/08/20 1651     Gram Stain Result Rare WBC's      No organisms seen    Narrative:      T9-10 disc space    Blood culture [[id number]] Collected: 12/02/20 0020    Order Status: Completed Specimen: Blood Updated: 12/07/20 0612     Blood Culture, Routine No growth after 5 days.          Significant Diagnostics:  No results found in the last 24 hours.

## 2020-12-12 NOTE — ASSESSMENT & PLAN NOTE
70 F with known MRSA bacteremia with thoracic osteomyelitis presenting as transfer from SNF after CT T spine concerning for increased erosion of T9/T10 vertebral bodies as well as bilateral T9 pedicle fractures now s/p T9-10 corpectomy with T7-12 PSIF on 12/8:    Neurologically stable on exam.      - q4h neuro checks  - Post op imaging pending when able to sit unassisted.   - Continue HV drain, will likely remove L HV today given decreased output. Continue WV throughout POD5.   - TLSO brace when up/OOB  - Pain control: scheduled Tylenol and Robaxin, Oxycodone PRN  - Recommend repeat TTE as outpatient given changes from 8/10 - 10/12. Pt may have had NSTEMI. Endocarditis felt less likely.  - H/o MRSA Bacteremia: Afebrile, no leukocytosis.    - ID following, appreciate assistance. Continue ceftaroline, started vancomycin. OR cultures remain NGTD.  - On 3L via nasal cannula, wean as tolerated to maintain O2 sats > 90% on RA  - Anemia: improved s/p transfusion 1U RBC 12/10, now 9.1/30, will ctm and transfuse for Hb < 7 or symptomatic at Hb < 8.  - CAD, s/p Cardiac Stent: MI in 2000 which required stent placements. Pt takes ASA and Plavix, last dose  11/30.   - Hold ASA/Plavix preoperatively  - YOVANI: CPAP qhs at home settings  - HTN: SBP goal <160. HR 60s, home dose metoprolol dc'd by NCC prior to stepdown, will continue to hold. Will resume home dose Imdur and Lisinopril as tolerated.  - Alzheimer's: Continue home memantine and donepezil  - HLD: Continue home pravastatin  - DVT prophylaxis: SHAYLA's, SCD's, SQH.   - Bowel regimen: senna BID     Contact Neurosurgery with any questions, concerns, or neuro changes.    Dispo: pending IPR

## 2020-12-13 LAB
ALBUMIN SERPL BCP-MCNC: 1.7 G/DL (ref 3.5–5.2)
ALP SERPL-CCNC: 100 U/L (ref 55–135)
ALT SERPL W/O P-5'-P-CCNC: <5 U/L (ref 10–44)
ANION GAP SERPL CALC-SCNC: 5 MMOL/L (ref 8–16)
AST SERPL-CCNC: 8 U/L (ref 10–40)
BASOPHILS # BLD AUTO: 0.03 K/UL (ref 0–0.2)
BASOPHILS NFR BLD: 0.4 % (ref 0–1.9)
BILIRUB SERPL-MCNC: 0.2 MG/DL (ref 0.1–1)
BUN SERPL-MCNC: 6 MG/DL (ref 8–23)
CALCIUM SERPL-MCNC: 9.1 MG/DL (ref 8.7–10.5)
CHLORIDE SERPL-SCNC: 104 MMOL/L (ref 95–110)
CO2 SERPL-SCNC: 31 MMOL/L (ref 23–29)
CREAT SERPL-MCNC: 0.7 MG/DL (ref 0.5–1.4)
DIFFERENTIAL METHOD: ABNORMAL
EOSINOPHIL # BLD AUTO: 0.2 K/UL (ref 0–0.5)
EOSINOPHIL NFR BLD: 3 % (ref 0–8)
ERYTHROCYTE [DISTWIDTH] IN BLOOD BY AUTOMATED COUNT: 16.8 % (ref 11.5–14.5)
EST. GFR  (AFRICAN AMERICAN): >60 ML/MIN/1.73 M^2
EST. GFR  (NON AFRICAN AMERICAN): >60 ML/MIN/1.73 M^2
GLUCOSE SERPL-MCNC: 77 MG/DL (ref 70–110)
HCT VFR BLD AUTO: 30.2 % (ref 37–48.5)
HGB BLD-MCNC: 8.9 G/DL (ref 12–16)
IMM GRANULOCYTES # BLD AUTO: 0.04 K/UL (ref 0–0.04)
IMM GRANULOCYTES NFR BLD AUTO: 0.5 % (ref 0–0.5)
LYMPHOCYTES # BLD AUTO: 1.3 K/UL (ref 1–4.8)
LYMPHOCYTES NFR BLD: 16.5 % (ref 18–48)
MAGNESIUM SERPL-MCNC: 1.6 MG/DL (ref 1.6–2.6)
MCH RBC QN AUTO: 28.3 PG (ref 27–31)
MCHC RBC AUTO-ENTMCNC: 29.5 G/DL (ref 32–36)
MCV RBC AUTO: 96 FL (ref 82–98)
MONOCYTES # BLD AUTO: 1.3 K/UL (ref 0.3–1)
MONOCYTES NFR BLD: 17.2 % (ref 4–15)
NEUTROPHILS # BLD AUTO: 4.8 K/UL (ref 1.8–7.7)
NEUTROPHILS NFR BLD: 62.4 % (ref 38–73)
NRBC BLD-RTO: 0 /100 WBC
PHOSPHATE SERPL-MCNC: 3 MG/DL (ref 2.7–4.5)
PLATELET # BLD AUTO: 280 K/UL (ref 150–350)
PMV BLD AUTO: 10.6 FL (ref 9.2–12.9)
POTASSIUM SERPL-SCNC: 3.1 MMOL/L (ref 3.5–5.1)
PROT SERPL-MCNC: 5.1 G/DL (ref 6–8.4)
RBC # BLD AUTO: 3.14 M/UL (ref 4–5.4)
SODIUM SERPL-SCNC: 140 MMOL/L (ref 136–145)
VANCOMYCIN TROUGH SERPL-MCNC: 12.5 UG/ML (ref 10–22)
WBC # BLD AUTO: 7.62 K/UL (ref 3.9–12.7)

## 2020-12-13 PROCEDURE — 36415 COLL VENOUS BLD VENIPUNCTURE: CPT

## 2020-12-13 PROCEDURE — 83735 ASSAY OF MAGNESIUM: CPT

## 2020-12-13 PROCEDURE — 80202 ASSAY OF VANCOMYCIN: CPT

## 2020-12-13 PROCEDURE — 85025 COMPLETE CBC W/AUTO DIFF WBC: CPT

## 2020-12-13 PROCEDURE — 25000003 PHARM REV CODE 250: Performed by: PHYSICIAN ASSISTANT

## 2020-12-13 PROCEDURE — 63600175 PHARM REV CODE 636 W HCPCS: Performed by: STUDENT IN AN ORGANIZED HEALTH CARE EDUCATION/TRAINING PROGRAM

## 2020-12-13 PROCEDURE — 63600175 PHARM REV CODE 636 W HCPCS: Mod: JG | Performed by: PHYSICIAN ASSISTANT

## 2020-12-13 PROCEDURE — 63600175 PHARM REV CODE 636 W HCPCS: Performed by: PHYSICIAN ASSISTANT

## 2020-12-13 PROCEDURE — 63600175 PHARM REV CODE 636 W HCPCS: Performed by: NEUROLOGICAL SURGERY

## 2020-12-13 PROCEDURE — 25000003 PHARM REV CODE 250: Performed by: STUDENT IN AN ORGANIZED HEALTH CARE EDUCATION/TRAINING PROGRAM

## 2020-12-13 PROCEDURE — 84100 ASSAY OF PHOSPHORUS: CPT

## 2020-12-13 PROCEDURE — 25000003 PHARM REV CODE 250: Performed by: NEUROLOGICAL SURGERY

## 2020-12-13 PROCEDURE — 11000001 HC ACUTE MED/SURG PRIVATE ROOM

## 2020-12-13 PROCEDURE — 80053 COMPREHEN METABOLIC PANEL: CPT

## 2020-12-13 RX ORDER — POTASSIUM CHLORIDE 7.45 MG/ML
10 INJECTION INTRAVENOUS
Status: COMPLETED | OUTPATIENT
Start: 2020-12-13 | End: 2020-12-13

## 2020-12-13 RX ADMIN — DONEPEZIL HYDROCHLORIDE 10 MG: 10 TABLET ORAL at 10:12

## 2020-12-13 RX ADMIN — ACETAMINOPHEN 650 MG: 325 TABLET ORAL at 12:12

## 2020-12-13 RX ADMIN — SENNOSIDES AND DOCUSATE SODIUM 1 TABLET: 8.6; 5 TABLET ORAL at 10:12

## 2020-12-13 RX ADMIN — PANTOPRAZOLE SODIUM 40 MG: 40 TABLET, DELAYED RELEASE ORAL at 05:12

## 2020-12-13 RX ADMIN — POTASSIUM CHLORIDE 10 MEQ: 7.46 INJECTION, SOLUTION INTRAVENOUS at 09:12

## 2020-12-13 RX ADMIN — ACETAMINOPHEN 650 MG: 325 TABLET ORAL at 05:12

## 2020-12-13 RX ADMIN — OXYCODONE HYDROCHLORIDE 10 MG: 10 TABLET ORAL at 08:12

## 2020-12-13 RX ADMIN — HEPARIN SODIUM 5000 UNITS: 5000 INJECTION INTRAVENOUS; SUBCUTANEOUS at 01:12

## 2020-12-13 RX ADMIN — SENNOSIDES AND DOCUSATE SODIUM 1 TABLET: 8.6; 5 TABLET ORAL at 08:12

## 2020-12-13 RX ADMIN — POTASSIUM CHLORIDE 10 MEQ: 7.46 INJECTION, SOLUTION INTRAVENOUS at 11:12

## 2020-12-13 RX ADMIN — POTASSIUM CHLORIDE 10 MEQ: 7.46 INJECTION, SOLUTION INTRAVENOUS at 01:12

## 2020-12-13 RX ADMIN — POTASSIUM CHLORIDE 10 MEQ: 7.46 INJECTION, SOLUTION INTRAVENOUS at 10:12

## 2020-12-13 RX ADMIN — PRAVASTATIN SODIUM 40 MG: 40 TABLET ORAL at 10:12

## 2020-12-13 RX ADMIN — METHOCARBAMOL 750 MG: 750 TABLET ORAL at 08:12

## 2020-12-13 RX ADMIN — METHOCARBAMOL 750 MG: 750 TABLET ORAL at 05:12

## 2020-12-13 RX ADMIN — METHOCARBAMOL 750 MG: 750 TABLET ORAL at 10:12

## 2020-12-13 RX ADMIN — CEFTAROLINE FOSAMIL 600 MG: 600 POWDER, FOR SOLUTION INTRAVENOUS at 01:12

## 2020-12-13 RX ADMIN — LISINOPRIL 2.5 MG: 2.5 TABLET ORAL at 08:12

## 2020-12-13 RX ADMIN — CEFTAROLINE FOSAMIL 600 MG: 600 POWDER, FOR SOLUTION INTRAVENOUS at 10:12

## 2020-12-13 RX ADMIN — OXYCODONE HYDROCHLORIDE 5 MG: 5 TABLET ORAL at 10:12

## 2020-12-13 RX ADMIN — VANCOMYCIN HYDROCHLORIDE 1250 MG: 10 INJECTION, POWDER, LYOPHILIZED, FOR SOLUTION INTRAVENOUS at 05:12

## 2020-12-13 RX ADMIN — MEMANTINE HYDROCHLORIDE 10 MG: 10 TABLET ORAL at 10:12

## 2020-12-13 RX ADMIN — CEFTAROLINE FOSAMIL 600 MG: 600 POWDER, FOR SOLUTION INTRAVENOUS at 05:12

## 2020-12-13 RX ADMIN — ISOSORBIDE MONONITRATE 60 MG: 30 TABLET, EXTENDED RELEASE ORAL at 08:12

## 2020-12-13 RX ADMIN — METHOCARBAMOL 750 MG: 750 TABLET ORAL at 01:12

## 2020-12-13 RX ADMIN — MEMANTINE HYDROCHLORIDE 10 MG: 10 TABLET ORAL at 08:12

## 2020-12-13 RX ADMIN — HEPARIN SODIUM 5000 UNITS: 5000 INJECTION INTRAVENOUS; SUBCUTANEOUS at 10:12

## 2020-12-13 RX ADMIN — DULOXETINE HYDROCHLORIDE 60 MG: 60 CAPSULE, DELAYED RELEASE ORAL at 08:12

## 2020-12-13 RX ADMIN — HEPARIN SODIUM 5000 UNITS: 5000 INJECTION INTRAVENOUS; SUBCUTANEOUS at 05:12

## 2020-12-13 NOTE — PLAN OF CARE
POC reviewed with pt. Pt verbalized understanding. Questions and concerns addressed. No acute events overnight. Pt resting comfortably. Pain managed with prn pain medication. Fall/safety precautions maintained. Bed locked and in lowest position with call light within reach. See flowsheets for full assessment and VS information.       Problem: Adult Inpatient Plan of Care  Goal: Plan of Care Review  Outcome: Ongoing, Progressing  Goal: Optimal Comfort and Wellbeing  Outcome: Ongoing, Progressing     Problem: Fall Injury Risk  Goal: Absence of Fall and Fall-Related Injury  Outcome: Ongoing, Progressing     Problem: Skin Injury Risk Increased  Goal: Skin Health and Integrity  Outcome: Ongoing, Progressing

## 2020-12-13 NOTE — PLAN OF CARE
Pt AAOx4, VSS, tele in place - sinus ryann to NSR noted, sats stable on 2L NC, afebrile. Chest and abdominal xray completed at bedside today. K 3.1 this AM - IV replacement given per order. Abx continued. Pt c/o back pain - pt reports moderate relief with positioning and PRN medications. WV in place over back incision - 125 continuous suction - see flowsheet for output. L posterior hemovac drain in place at full suction - see flowsheet for output. Skin bruised but intact - continuing to encourage frequent position changes but pt refuses some turns 2/2 pain. Pressure points protected and waffle mattress in place. Pt voids per external catheter - no breakdown at site noted - perineum cleansed and purewick changed. Pt with 1 moderate BM this shift. Pt able to assist with turns. Pt instructed to use call light for assistance - pt demonstrated and verbalized understanding - call bell placed within pt reach. Safety precautions maintained throughout shift - non skid footwear used while out of bed. Pt in no apparent distress - will continue to monitor pt, proactively round on pt, and adjust care as needed.

## 2020-12-14 LAB
ALBUMIN SERPL BCP-MCNC: 1.8 G/DL (ref 3.5–5.2)
ALP SERPL-CCNC: 97 U/L (ref 55–135)
ALT SERPL W/O P-5'-P-CCNC: 7 U/L (ref 10–44)
ANION GAP SERPL CALC-SCNC: 7 MMOL/L (ref 8–16)
AST SERPL-CCNC: 9 U/L (ref 10–40)
BASOPHILS # BLD AUTO: 0.02 K/UL (ref 0–0.2)
BASOPHILS NFR BLD: 0.3 % (ref 0–1.9)
BILIRUB SERPL-MCNC: 0.2 MG/DL (ref 0.1–1)
BUN SERPL-MCNC: 5 MG/DL (ref 8–23)
CALCIUM SERPL-MCNC: 8.7 MG/DL (ref 8.7–10.5)
CHLORIDE SERPL-SCNC: 104 MMOL/L (ref 95–110)
CO2 SERPL-SCNC: 28 MMOL/L (ref 23–29)
CREAT SERPL-MCNC: 0.7 MG/DL (ref 0.5–1.4)
DIFFERENTIAL METHOD: ABNORMAL
EOSINOPHIL # BLD AUTO: 0.2 K/UL (ref 0–0.5)
EOSINOPHIL NFR BLD: 3.4 % (ref 0–8)
ERYTHROCYTE [DISTWIDTH] IN BLOOD BY AUTOMATED COUNT: 16.9 % (ref 11.5–14.5)
EST. GFR  (AFRICAN AMERICAN): >60 ML/MIN/1.73 M^2
EST. GFR  (NON AFRICAN AMERICAN): >60 ML/MIN/1.73 M^2
GLUCOSE SERPL-MCNC: 74 MG/DL (ref 70–110)
HCT VFR BLD AUTO: 28.9 % (ref 37–48.5)
HGB BLD-MCNC: 8.6 G/DL (ref 12–16)
IMM GRANULOCYTES # BLD AUTO: 0.04 K/UL (ref 0–0.04)
IMM GRANULOCYTES NFR BLD AUTO: 0.6 % (ref 0–0.5)
LYMPHOCYTES # BLD AUTO: 1.7 K/UL (ref 1–4.8)
LYMPHOCYTES NFR BLD: 24.8 % (ref 18–48)
MAGNESIUM SERPL-MCNC: 1.6 MG/DL (ref 1.6–2.6)
MCH RBC QN AUTO: 28 PG (ref 27–31)
MCHC RBC AUTO-ENTMCNC: 29.8 G/DL (ref 32–36)
MCV RBC AUTO: 94 FL (ref 82–98)
MONOCYTES # BLD AUTO: 1.3 K/UL (ref 0.3–1)
MONOCYTES NFR BLD: 19.4 % (ref 4–15)
NEUTROPHILS # BLD AUTO: 3.5 K/UL (ref 1.8–7.7)
NEUTROPHILS NFR BLD: 51.5 % (ref 38–73)
NRBC BLD-RTO: 0 /100 WBC
PHOSPHATE SERPL-MCNC: 2.7 MG/DL (ref 2.7–4.5)
PLATELET # BLD AUTO: 287 K/UL (ref 150–350)
PMV BLD AUTO: 10 FL (ref 9.2–12.9)
POTASSIUM SERPL-SCNC: 3.5 MMOL/L (ref 3.5–5.1)
PROT SERPL-MCNC: 5.2 G/DL (ref 6–8.4)
RBC # BLD AUTO: 3.07 M/UL (ref 4–5.4)
SODIUM SERPL-SCNC: 139 MMOL/L (ref 136–145)
WBC # BLD AUTO: 6.82 K/UL (ref 3.9–12.7)

## 2020-12-14 PROCEDURE — 63600175 PHARM REV CODE 636 W HCPCS: Performed by: NEUROLOGICAL SURGERY

## 2020-12-14 PROCEDURE — 25000003 PHARM REV CODE 250: Performed by: PHYSICIAN ASSISTANT

## 2020-12-14 PROCEDURE — 99024 POSTOP FOLLOW-UP VISIT: CPT | Mod: POP,,, | Performed by: PHYSICIAN ASSISTANT

## 2020-12-14 PROCEDURE — 85025 COMPLETE CBC W/AUTO DIFF WBC: CPT

## 2020-12-14 PROCEDURE — 25000003 PHARM REV CODE 250: Performed by: STUDENT IN AN ORGANIZED HEALTH CARE EDUCATION/TRAINING PROGRAM

## 2020-12-14 PROCEDURE — 84100 ASSAY OF PHOSPHORUS: CPT

## 2020-12-14 PROCEDURE — 80053 COMPREHEN METABOLIC PANEL: CPT

## 2020-12-14 PROCEDURE — 63600175 PHARM REV CODE 636 W HCPCS: Mod: JG | Performed by: PHYSICIAN ASSISTANT

## 2020-12-14 PROCEDURE — 94761 N-INVAS EAR/PLS OXIMETRY MLT: CPT

## 2020-12-14 PROCEDURE — 63600175 PHARM REV CODE 636 W HCPCS: Performed by: PHYSICIAN ASSISTANT

## 2020-12-14 PROCEDURE — 11000001 HC ACUTE MED/SURG PRIVATE ROOM

## 2020-12-14 PROCEDURE — 36415 COLL VENOUS BLD VENIPUNCTURE: CPT

## 2020-12-14 PROCEDURE — 83735 ASSAY OF MAGNESIUM: CPT

## 2020-12-14 PROCEDURE — 27000221 HC OXYGEN, UP TO 24 HOURS

## 2020-12-14 PROCEDURE — 97530 THERAPEUTIC ACTIVITIES: CPT | Mod: CQ

## 2020-12-14 PROCEDURE — 99024 PR POST-OP FOLLOW-UP VISIT: ICD-10-PCS | Mod: POP,,, | Performed by: PHYSICIAN ASSISTANT

## 2020-12-14 RX ADMIN — MEMANTINE HYDROCHLORIDE 10 MG: 10 TABLET ORAL at 09:12

## 2020-12-14 RX ADMIN — BISACODYL 10 MG: 10 SUPPOSITORY RECTAL at 08:12

## 2020-12-14 RX ADMIN — ACETAMINOPHEN 650 MG: 325 TABLET ORAL at 12:12

## 2020-12-14 RX ADMIN — CEFTAROLINE FOSAMIL 600 MG: 600 POWDER, FOR SOLUTION INTRAVENOUS at 05:12

## 2020-12-14 RX ADMIN — PANTOPRAZOLE SODIUM 40 MG: 40 TABLET, DELAYED RELEASE ORAL at 05:12

## 2020-12-14 RX ADMIN — HEPARIN SODIUM 5000 UNITS: 5000 INJECTION INTRAVENOUS; SUBCUTANEOUS at 03:12

## 2020-12-14 RX ADMIN — METHOCARBAMOL 750 MG: 750 TABLET ORAL at 06:12

## 2020-12-14 RX ADMIN — METHOCARBAMOL 750 MG: 750 TABLET ORAL at 09:12

## 2020-12-14 RX ADMIN — DULOXETINE HYDROCHLORIDE 60 MG: 60 CAPSULE, DELAYED RELEASE ORAL at 08:12

## 2020-12-14 RX ADMIN — HEPARIN SODIUM 5000 UNITS: 5000 INJECTION INTRAVENOUS; SUBCUTANEOUS at 05:12

## 2020-12-14 RX ADMIN — CEFTAROLINE FOSAMIL 600 MG: 600 POWDER, FOR SOLUTION INTRAVENOUS at 02:12

## 2020-12-14 RX ADMIN — ACETAMINOPHEN 650 MG: 325 TABLET ORAL at 06:12

## 2020-12-14 RX ADMIN — ACETAMINOPHEN 650 MG: 325 TABLET ORAL at 05:12

## 2020-12-14 RX ADMIN — HEPARIN SODIUM 5000 UNITS: 5000 INJECTION INTRAVENOUS; SUBCUTANEOUS at 09:12

## 2020-12-14 RX ADMIN — PRAVASTATIN SODIUM 40 MG: 40 TABLET ORAL at 09:12

## 2020-12-14 RX ADMIN — OXYCODONE HYDROCHLORIDE 10 MG: 10 TABLET ORAL at 02:12

## 2020-12-14 RX ADMIN — CEFTAROLINE FOSAMIL 600 MG: 600 POWDER, FOR SOLUTION INTRAVENOUS at 09:12

## 2020-12-14 RX ADMIN — METHOCARBAMOL 750 MG: 750 TABLET ORAL at 12:12

## 2020-12-14 RX ADMIN — VANCOMYCIN HYDROCHLORIDE 1250 MG: 10 INJECTION, POWDER, LYOPHILIZED, FOR SOLUTION INTRAVENOUS at 10:12

## 2020-12-14 RX ADMIN — LISINOPRIL 2.5 MG: 2.5 TABLET ORAL at 08:12

## 2020-12-14 RX ADMIN — OXYCODONE HYDROCHLORIDE 5 MG: 5 TABLET ORAL at 09:12

## 2020-12-14 RX ADMIN — ISOSORBIDE MONONITRATE 60 MG: 30 TABLET, EXTENDED RELEASE ORAL at 08:12

## 2020-12-14 RX ADMIN — METHOCARBAMOL 750 MG: 750 TABLET ORAL at 08:12

## 2020-12-14 RX ADMIN — MEMANTINE HYDROCHLORIDE 10 MG: 10 TABLET ORAL at 08:12

## 2020-12-14 RX ADMIN — DONEPEZIL HYDROCHLORIDE 10 MG: 10 TABLET ORAL at 09:12

## 2020-12-14 RX ADMIN — SENNOSIDES AND DOCUSATE SODIUM 1 TABLET: 8.6; 5 TABLET ORAL at 08:12

## 2020-12-14 NOTE — ASSESSMENT & PLAN NOTE
70 F with known MRSA bacteremia with thoracic osteomyelitis presenting as transfer from SNF after CT T spine concerning for increased erosion of T9/T10 vertebral bodies as well as bilateral T9 pedicle fractures now s/p T9-10 corpectomy with T7-12 PSIF on 12/8:    Neurologically stable on exam. Fitted tlso brace in place.  - q4h neuro checks  - Post op imaging obtained when flat as patient is unable to sit up unassisted, satisfactory hardware placement.  - TLSO brace when up/OOB  - Pain control: scheduled Tylenol and Robaxin, Oxycodone PRN  - Recommend repeat TTE as outpatient given changes from 8/10 - 10/12. Pt may have had NSTEMI. Endocarditis felt less likely.  - H/o MRSA Bacteremia: Afebrile, no leukocytosis.    - ID following, appreciate assistance. Continue ceftaroline and vancomycin. Can discharge to rehab on vancomycin once therapeutic as [facility] does not carry ceftaroline. OR cultures remain NGTD.  - On 2L via nasal cannula, wean as tolerated to maintain O2 sats > 90% on RA  - Anemia: improved s/p transfusion 1U RBC 12/10, now stable. Will ctm and transfuse for Hb < 7 or symptomatic at Hb < 8.  - CAD, s/p Cardiac Stent: MI in 2000 which required stent placements. Pt takes ASA and Plavix, last dose  11/30.   - Hold ASA/Plavix perioperatively, will resume 12/15  - YOVANI: CPAP qhs at home settings  - HTN: SBP goal <160. HR 60s, home dose metoprolol dc'd by NCC prior to stepdown, will continue to hold. Will resume home dose Imdur and Lisinopril as tolerated.  - Alzheimer's: Continue home memantine and donepezil  - HLD: Continue home pravastatin  - DVT prophylaxis: SHAYLA's, SCD's, SQH.   - Bowel regimen: senna BID     Contact Neurosurgery with any questions, concerns, or neuro changes.    Dispo: pending IPR, medically stable for discharge

## 2020-12-14 NOTE — PROGRESS NOTES
[facility]  Neurosurgery  Progress Note    Subjective:     History of Present Illness: 70 y.o. [place] [facility] PMH of Alzheimer's dz, HTN, HLD, CAD, YOVANI, and morbid obesity who was recently hospitalized x2 for MRSA bacteremia complicated by pneumonia and possible UTI, treated with linozolid x8 days and cipro and discharged 10/1. She was then readmitted to [facility] on 10/8 with severe back pain. MRI revealed T9-10 osteodiscitis and T8-10 epidural phlegmon with associated paraverterbral abscesses. Spine infection likely hematogenous from under-treated bacteremia. Patient had no neurologic deficits on exam, no indications for emergent neurosurgical intervention, plan was made for conservative non-surgical treatment. She underwent needle aspiration of T9-10 disc space on 10/16, cultures were negative although she had already been undergoing antibiotic treatment. Repeat BCx remained no growth and she remained afebrile and neurologically stable. Patient was discharged to [facility] on 10/20 with plan for Vancomycin IV x 8 weeks (estimated end date 12/3).    Patient presents today after interval imaging obtained 11/30 revealed worsening bony destruction, unstable T9 fracture. Transferred for [facility].     Post-Op Info:  Procedure(s) (LRB):  T9-T10 corpectomy, posterior instrumented fusion T7-T12. (N/A)   6 Days Post-Op     Interval History: NAEON. AFVSS. O2 sats stable on 2L via nasal cannula. Vanc subtherapeutic, discussed with CM regarding ability of rehab to continue vanc dosing as next trough due tomorrow evening. Reports she was able to sit EOB with therapy, not able to get OOB to chair. Bed mobility improved, able to turn self well. Neuro exam remains stable. Voiding spontaneously. WV and final HV removed. Denies chest pain, SOB. Pending discharge to [facility], medically stable.     Medications:  Continuous Infusions:  Scheduled Meds:   acetaminophen  650 mg Oral Q6H    bisacodyL  10  mg Rectal Daily    ceftaroline fosamil  600 mg Intravenous Q8H    donepeziL  10 mg Oral QHS    DULoxetine  60 mg Oral Daily    heparin (porcine)  5,000 Units Subcutaneous Q8H    isosorbide mononitrate  60 mg Oral Daily    lisinopriL  2.5 mg Oral Daily    memantine  10 mg Oral BID    methocarbamoL  750 mg Oral QID    pantoprazole  40 mg Oral Before breakfast    pravastatin  40 mg Oral QHS    senna-docusate 8.6-50 mg  1 tablet Oral BID    vancomycin (VANCOCIN) IVPB  1,250 mg Intravenous Q24H     PRN Meds:sodium chloride, albuterol-ipratropium, aluminum-magnesium hydroxide-simethicone, dextrose 50%, dextrose 50%, glucagon (human recombinant), glucose, glucose, glucose, insulin aspart U-100, labetaloL, morphine, ondansetron, oxyCODONE, oxyCODONE, promethazine (PHENERGAN) IVPB, senna-docusate 8.6-50 mg, Pharmacy to dose Vancomycin consult **AND** vancomycin - pharmacy to dose       Objective:     Weight: (!) 141.3 kg (311 lb 8.2 oz)  Body mass index is 44.7 kg/m².  Vital Signs (Most Recent):  Temp: 98.9 °F (37.2 °C) (12/14/20 0500)  Pulse: 62 (12/14/20 1051)  Resp: 12 (12/14/20 0500)  BP: 134/77 (12/14/20 0500)  SpO2: (!) 92 % (12/14/20 0500) Vital Signs (24h Range):  Temp:  [98.3 °F (36.8 °C)-99 °F (37.2 °C)] 98.9 °F (37.2 °C)  Pulse:  [60-67] 62  Resp:  [10-19] 12  SpO2:  [92 %-98 %] 92 %  BP: (127-134)/(68-77) 134/77     Date 12/14/20 0700 - 12/15/20 0659   Shift 5892-2399 7989-6617 3249-1131 24 Hour Total   INTAKE   Shift Total(mL/kg)       OUTPUT   Other 0   0   Shift Total(mL/kg) 0(0)   0(0)   Weight (kg) 141.3 141.3 141.3 141.3                        Closed/Suction Drain 12/08/20 1239 Right Back Accordion 10 Fr. (Active)   Site Description Unable to view 12/13/20 2000   Dressing Type Transparent (Tegaderm) 12/13/20 2000   Dressing Status Clean;Dry;Intact 12/13/20 2000   Dressing Intervention Integrity maintained 12/13/20 2000   Drainage Serosanguineous 12/13/20 2000   Status To bulb suction 12/13/20  2000   Output (mL) 0 mL 12/13/20 1800            Closed/Suction Drain 12/08/20 1239 Left Back Accordion 10 Fr. (Active)   Site Description Unable to view 12/13/20 2000   Dressing Type Transparent (Tegaderm) 12/13/20 2000   Dressing Status Clean;Dry;Intact 12/13/20 2000   Dressing Intervention Integrity maintained 12/13/20 2000   Drainage Serosanguineous 12/13/20 2000   Status To bulb suction 12/13/20 2000   Output (mL) 0 mL 12/13/20 1800       Female External Urinary Catheter 12/09/20 1741 (Active)   Skin no redness;no breakdown 12/14/20 0800   Tolerance no signs/symptoms of discomfort 12/14/20 0800   Suction Continuous suction at 60 mmHg 12/14/20 0800   Date of last wick change 12/14/20 12/14/20 0800   Time of last wick change 2000 12/13/20 2000   Output (mL) 800 mL 12/13/20 1100       Neurosurgery Physical Exam    General: well developed, well nourished, no distress.   Head: normocephalic, atraumatic  Neck: No tracheal deviation.   Neurologic: Alert and oriented. Thought content appropriate.  GCS: Motor: 6/Verbal: 5/Eyes: 4 GCS Total: 15  Mental Status: Awake, Alert, Oriented x 4  Language: No aphasia  Speech: No dysarthria  Cranial nerves: face symmetric, tongue midline, CN II-XII grossly intact.   Eyes: pupils equal, round, reactive to light with accomodation, EOMI.  Ears: No drainage.   Pulmonary: normal respirations, no signs of respiratory distress, nasal cannula in place  Abdomen: soft, non-distended, not tender to palpation  Sensory: intact to light touch throughout  Motor Strength: Moves all extremities spontaneously with good tone.  Full strength upper and lower extremities, pain limited weakness proximal LE mostly on KE. No abnormal movements seen.     Strength  Deltoids Triceps Biceps Wrist Extension Wrist Flexion Hand    Upper: R 5/5 5/5 5/5 5/5 5/5 5/5    L 5/5 5/5 5/5 5/5 5/5 5/5     Iliopsoas Quadriceps Knee  Flexion Tibialis  anterior Gastro- cnemius EHL   Lower: R 4+/5 4/5 4+/5 5/5 5/5 5/5     L 4+/5 4/5 4+/5 5/5 5/5 5/5     Vascular: No LE edema.   Skin: Skin is warm, dry and intact.    Incision c/d/I with skin edges well approximated with staples. No surrounding erythema or edema. No drainage from incision. No palpable hematoma or underlying fluid collection.      Significant Labs:  Recent Labs   Lab 12/13/20  0518 12/14/20  0409   GLU 77 74    139   K 3.1* 3.5    104   CO2 31* 28   BUN 6* 5*   CREATININE 0.7 0.7   CALCIUM 9.1 8.7   MG 1.6 1.6     Recent Labs   Lab 12/13/20  0739 12/14/20  0409   WBC 7.62 6.82   HGB 8.9* 8.6*   HCT 30.2* 28.9*    287     No results for input(s): LABPT, INR, APTT in the last 48 hours.  Microbiology Results (last 7 days)     Procedure Component Value Units Date/Time    Aerobic culture [[id number]] Collected: 12/08/20 1139    Order Status: Completed Specimen: Back Updated: 12/12/20 1007     Aerobic Bacterial Culture No growth    Narrative:      T9-10 disc space    Aerobic culture [[id number]] Collected: 12/08/20 1139    Order Status: Completed Specimen: Back Updated: 12/11/20 1047     Aerobic Bacterial Culture No growth    Narrative:      T9-10 disc space    Culture, Anaerobe [[id number]] Collected: 12/08/20 1139    Order Status: Completed Specimen: Back Updated: 12/11/20 0915     Anaerobic Culture Culture in progress    Narrative:      T9-10 disc space    Culture, Anaerobe [[id number]] Collected: 12/08/20 1139    Order Status: Completed Specimen: Back Updated: 12/11/20 0915     Anaerobic Culture Culture in progress    Narrative:      T9-10 disc space    Fungus culture [[id number]] Collected: 12/08/20 1139    Order Status: Completed Specimen: Back Updated: 12/10/20 1118     Fungus (Mycology) Culture Culture in progress    Narrative:      T9-10 disc space    Fungus culture [[id number]] Collected: 12/08/20 1139    Order Status: Completed Specimen: Back Updated: 12/10/20 1118     Fungus (Mycology) Culture Culture in progress    Narrative:      T9-10 disc space     Gram stain [[id number]] Collected: 12/08/20 1139    Order Status: Completed Specimen: Back Updated: 12/08/20 1652     Gram Stain Result Rare WBC's      No organisms seen    Narrative:      T9-10 disc space    Gram stain [[id number]] Collected: 12/08/20 1139    Order Status: Completed Specimen: Back Updated: 12/08/20 1651     Gram Stain Result Rare WBC's      No organisms seen    Narrative:      T9-10 disc space        Recent Lab Results       12/14/20  0409   12/13/20  1752        Albumin 1.8       Alkaline Phosphatase 97       ALT 7       Anion Gap 7       AST 9       Baso # 0.02       Basophil % 0.3       BILIRUBIN TOTAL 0.2  Comment:  For infants and newborns, interpretation of results should be based  on gestational age, weight and in agreement with clinical  observations.  Premature Infant recommended reference ranges:  Up to 24 hours.............<8.0 mg/dL  Up to 48 hours............<12.0 mg/dL  3-5 days..................<15.0 mg/dL  6-29 days.................<15.0 mg/dL         BUN 5       Calcium 8.7       Chloride 104       CO2 28       Creatinine 0.7       Differential Method Automated       eGFR if  >60.0       eGFR if non  >60.0  Comment:  Calculation used to obtain the estimated glomerular filtration  rate (eGFR) is the CKD-EPI equation.          Eos # 0.2       Eosinophil % 3.4       Glucose 74       Gran # (ANC) 3.5       Gran % 51.5       Hematocrit 28.9       Hemoglobin 8.6       Immature Grans (Abs) 0.04  Comment:  Mild elevation in immature granulocytes is non specific and   can be seen in a variety of conditions including stress response,   acute inflammation, trauma and pregnancy. Correlation with other   laboratory and clinical findings is essential.         Immature Granulocytes 0.6       Lymph # 1.7       Lymph % 24.8       Magnesium 1.6       MCH 28.0       MCHC 29.8       MCV 94       Mono # 1.3       Mono % 19.4       MPV 10.0       nRBC 0       Phosphorus 2.7        Platelets 287       Potassium 3.5       PROTEIN TOTAL 5.2       RBC 3.07       RDW 16.9       Sodium 139       Vancomycin-Trough   12.5     WBC 6.82           All pertinent labs from the last 24 hours have been reviewed.    Significant Diagnostics:  I have reviewed all pertinent imaging results/findings within the past 24 hours.    Assessment/Plan:     * Osteomyelitis of thoracic vertebra  70 F with known MRSA bacteremia with thoracic osteomyelitis presenting as transfer from [facility] after CT T spine concerning for increased erosion of T9/T10 vertebral bodies as well as bilateral T9 pedicle fractures now s/p T9-10 corpectomy with T7-12 PSIF on 12/8:    Neurologically stable on exam. Fitted tlso brace in place.  - q4h neuro checks  - Post op imaging obtained when flat as patient is unable to sit up unassisted, satisfactory hardware placement.  - TLSO brace when up/OOB  - Pain control: scheduled Tylenol and Robaxin, Oxycodone PRN  - Recommend repeat TTE as outpatient given changes from 8/10 - 10/12. Pt may have had NSTEMI. Endocarditis felt less likely.  - H/o MRSA Bacteremia: Afebrile, no leukocytosis.    - ID following, appreciate assistance. Continue ceftaroline and vancomycin. Can discharge to rehab on vancomycin once therapeutic as [facility] does not carry ceftaroline. OR cultures remain NGTD.  - On 2L via nasal cannula, wean as tolerated to maintain O2 sats > 90% on RA  - Anemia: improved s/p transfusion 1U RBC 12/10, now stable. Will ctm and transfuse for Hb < 7 or symptomatic at Hb < 8.  - CAD, s/p Cardiac Stent: MI in 2000 which required stent placements. Pt takes ASA and Plavix, last dose  11/30.   - Hold ASA/Plavix perioperatively, will resume 12/15  - YOVANI: CPAP qhs at home settings  - HTN: SBP goal <160. HR 60s, home dose metoprolol dc'd by NCC prior to stepdown, will continue to hold. Will resume home dose Imdur and Lisinopril as tolerated.  - Alzheimer's: Continue home memantine and  donepezil  - HLD: Continue home pravastatin  - DVT prophylaxis: SHAYLA's, SCD's, SQH.   - Bowel regimen: senna BID     Contact Neurosurgery with any questions, concerns, or neuro changes.    Dispo: pending IPR, medically stable for discharge    MRSA bacteremia            Carmen Metzger PA-C  Neurosurgery  [facility]

## 2020-12-14 NOTE — PHYSICIAN QUERY
PT Name: Martina Betancourt  MR #: [id number]    HEMATOLOGY CLARIFICATION      CDS: Maia Avila RN, CCDS       Contact information: [email or web address]    This form is a permanent document in the medical record.      Query Date: December 14, 2020    By submitting this query, we are merely seeking further clarification of documentation. Please utilize your independent clinical judgment when addressing the question(s) below.    The Medical Record contains the following:   Indicators  Supporting Clinical Findings Location in Medical Record   X Anemia documented - Anemia: improved s/p transfusion 1U RBC 12/10, now 9.1/30, will ctm and transfuse for Hb < 7 or symptomatic at Hb < 8.   12/11-NSGY PN   X  X  X   H&H Hgb=11.0, Hct=37.6  Hgb=7.9, Hct=26.9  Hgb=9.1, Hct=30.2 12/2-Labs  12/10-Labs  12/11-Labs    BP                    HR      GI bleeding documented      Acute bleeding (Non GI site)      Transfusion(s)     X Acute/Chronic illness Osteomyelitis of thoracic vertebra  Procedure: T9-T10 corpectomy, posterior instrumented fusion T7-T12.   12/9-NSGY PN      Treatments     X Other ESTIMATED BLOOD LOSS: 400mL  COMPLICATIONS: None   12/8-Op Note       Provider, please specify diagnosis or diagnoses associated with above clinical findings.     [ X  ] Acute blood loss anemia expected post-operatively    [   ] Other Hematological Diagnosis (please specify): _________________   [   ] Clinically Undetermined            Please document in your progress notes daily for the duration of treatment, until resolved, and include in your discharge summary.    Form No. [id number]

## 2020-12-14 NOTE — SUBJECTIVE & OBJECTIVE
Interval History: NAEON. AFVSS. O2 sats stable on 2L via nasal cannula. Vanc subtherapeutic, discussed with CM regarding ability of rehab to continue vanc dosing as next trough due tomorrow evening. Reports she was able to sit EOB with therapy, not able to get OOB to chair. Bed mobility improved, able to turn self well. Neuro exam remains stable. Voiding spontaneously. WV and final HV removed. Denies chest pain, SOB. Pending discharge to [facility], medically stable.     Medications:  Continuous Infusions:  Scheduled Meds:   acetaminophen  650 mg Oral Q6H    bisacodyL  10 mg Rectal Daily    ceftaroline fosamil  600 mg Intravenous Q8H    donepeziL  10 mg Oral QHS    DULoxetine  60 mg Oral Daily    heparin (porcine)  5,000 Units Subcutaneous Q8H    isosorbide mononitrate  60 mg Oral Daily    lisinopriL  2.5 mg Oral Daily    memantine  10 mg Oral BID    methocarbamoL  750 mg Oral QID    pantoprazole  40 mg Oral Before breakfast    pravastatin  40 mg Oral QHS    senna-docusate 8.6-50 mg  1 tablet Oral BID    vancomycin (VANCOCIN) IVPB  1,250 mg Intravenous Q24H     PRN Meds:sodium chloride, albuterol-ipratropium, aluminum-magnesium hydroxide-simethicone, dextrose 50%, dextrose 50%, glucagon (human recombinant), glucose, glucose, glucose, insulin aspart U-100, labetaloL, morphine, ondansetron, oxyCODONE, oxyCODONE, promethazine (PHENERGAN) IVPB, senna-docusate 8.6-50 mg, Pharmacy to dose Vancomycin consult **AND** vancomycin - pharmacy to dose       Objective:     Weight: (!) 141.3 kg (311 lb 8.2 oz)  Body mass index is 44.7 kg/m².  Vital Signs (Most Recent):  Temp: 98.9 °F (37.2 °C) (12/14/20 0500)  Pulse: 62 (12/14/20 1051)  Resp: 12 (12/14/20 0500)  BP: 134/77 (12/14/20 0500)  SpO2: (!) 92 % (12/14/20 0500) Vital Signs (24h Range):  Temp:  [98.3 °F (36.8 °C)-99 °F (37.2 °C)] 98.9 °F (37.2 °C)  Pulse:  [60-67] 62  Resp:  [10-19] 12  SpO2:  [92 %-98 %] 92 %  BP: (127-134)/(68-77) 134/77     Date 12/14/20 0700 -  12/15/20 0659   Shift 4820-2192 2341-3216 1309-1270 24 Hour Total   INTAKE   Shift Total(mL/kg)       OUTPUT   Other 0   0   Shift Total(mL/kg) 0(0)   0(0)   Weight (kg) 141.3 141.3 141.3 141.3                        Closed/Suction Drain 12/08/20 1239 Right Back Accordion 10 Fr. (Active)   Site Description Unable to view 12/13/20 2000   Dressing Type Transparent (Tegaderm) 12/13/20 2000   Dressing Status Clean;Dry;Intact 12/13/20 2000   Dressing Intervention Integrity maintained 12/13/20 2000   Drainage Serosanguineous 12/13/20 2000   Status To bulb suction 12/13/20 2000   Output (mL) 0 mL 12/13/20 1800            Closed/Suction Drain 12/08/20 1239 Left Back Accordion 10 Fr. (Active)   Site Description Unable to view 12/13/20 2000   Dressing Type Transparent (Tegaderm) 12/13/20 2000   Dressing Status Clean;Dry;Intact 12/13/20 2000   Dressing Intervention Integrity maintained 12/13/20 2000   Drainage Serosanguineous 12/13/20 2000   Status To bulb suction 12/13/20 2000   Output (mL) 0 mL 12/13/20 1800       Female External Urinary Catheter 12/09/20 1741 (Active)   Skin no redness;no breakdown 12/14/20 0800   Tolerance no signs/symptoms of discomfort 12/14/20 0800   Suction Continuous suction at 60 mmHg 12/14/20 0800   Date of last wick change 12/14/20 12/14/20 0800   Time of last wick change 2000 12/13/20 2000   Output (mL) 800 mL 12/13/20 1100       Neurosurgery Physical Exam    General: well developed, well nourished, no distress.   Head: normocephalic, atraumatic  Neck: No tracheal deviation.   Neurologic: Alert and oriented. Thought content appropriate.  GCS: Motor: 6/Verbal: 5/Eyes: 4 GCS Total: 15  Mental Status: Awake, Alert, Oriented x 4  Language: No aphasia  Speech: No dysarthria  Cranial nerves: face symmetric, tongue midline, CN II-XII grossly intact.   Eyes: pupils equal, round, reactive to light with accomodation, EOMI.  Ears: No drainage.   Pulmonary: normal respirations, no signs of respiratory  distress, nasal cannula in place  Abdomen: soft, non-distended, not tender to palpation  Sensory: intact to light touch throughout  Motor Strength: Moves all extremities spontaneously with good tone.  Full strength upper and lower extremities, pain limited weakness proximal LE mostly on KE. No abnormal movements seen.     Strength  Deltoids Triceps Biceps Wrist Extension Wrist Flexion Hand    Upper: R 5/5 5/5 5/5 5/5 5/5 5/5    L 5/5 5/5 5/5 5/5 5/5 5/5     Iliopsoas Quadriceps Knee  Flexion Tibialis  anterior Gastro- cnemius EHL   Lower: R 4+/5 4/5 4+/5 5/5 5/5 5/5    L 4+/5 4/5 4+/5 5/5 5/5 5/5     Vascular: No LE edema.   Skin: Skin is warm, dry and intact.    Incision c/d/I with skin edges well approximated with staples. No surrounding erythema or edema. No drainage from incision. No palpable hematoma or underlying fluid collection.      Significant Labs:  Recent Labs   Lab 12/13/20  0518 12/14/20  0409   GLU 77 74    139   K 3.1* 3.5    104   CO2 31* 28   BUN 6* 5*   CREATININE 0.7 0.7   CALCIUM 9.1 8.7   MG 1.6 1.6     Recent Labs   Lab 12/13/20  0739 12/14/20  0409   WBC 7.62 6.82   HGB 8.9* 8.6*   HCT 30.2* 28.9*    287     No results for input(s): LABPT, INR, APTT in the last 48 hours.  Microbiology Results (last 7 days)     Procedure Component Value Units Date/Time    Aerobic culture [[id number]] Collected: 12/08/20 1139    Order Status: Completed Specimen: Back Updated: 12/12/20 1007     Aerobic Bacterial Culture No growth    Narrative:      T9-10 disc space    Aerobic culture [[id number]] Collected: 12/08/20 1139    Order Status: Completed Specimen: Back Updated: 12/11/20 1047     Aerobic Bacterial Culture No growth    Narrative:      T9-10 disc space    Culture, Anaerobe [[id number]] Collected: 12/08/20 1139    Order Status: Completed Specimen: Back Updated: 12/11/20 0915     Anaerobic Culture Culture in progress    Narrative:      T9-10 disc space    Culture, Anaerobe [[id number]]  Collected: 12/08/20 1139    Order Status: Completed Specimen: Back Updated: 12/11/20 0915     Anaerobic Culture Culture in progress    Narrative:      T9-10 disc space    Fungus culture [[id number]] Collected: 12/08/20 1139    Order Status: Completed Specimen: Back Updated: 12/10/20 1118     Fungus (Mycology) Culture Culture in progress    Narrative:      T9-10 disc space    Fungus culture [[id number]] Collected: 12/08/20 1139    Order Status: Completed Specimen: Back Updated: 12/10/20 1118     Fungus (Mycology) Culture Culture in progress    Narrative:      T9-10 disc space    Gram stain [[id number]] Collected: 12/08/20 1139    Order Status: Completed Specimen: Back Updated: 12/08/20 1652     Gram Stain Result Rare WBC's      No organisms seen    Narrative:      T9-10 disc space    Gram stain [[id number]] Collected: 12/08/20 1139    Order Status: Completed Specimen: Back Updated: 12/08/20 1651     Gram Stain Result Rare WBC's      No organisms seen    Narrative:      T9-10 disc space        Recent Lab Results       12/14/20  0409   12/13/20  1752        Albumin 1.8       Alkaline Phosphatase 97       ALT 7       Anion Gap 7       AST 9       Baso # 0.02       Basophil % 0.3       BILIRUBIN TOTAL 0.2  Comment:  For infants and newborns, interpretation of results should be based  on gestational age, weight and in agreement with clinical  observations.  Premature Infant recommended reference ranges:  Up to 24 hours.............<8.0 mg/dL  Up to 48 hours............<12.0 mg/dL  3-5 days..................<15.0 mg/dL  6-29 days.................<15.0 mg/dL         BUN 5       Calcium 8.7       Chloride 104       CO2 28       Creatinine 0.7       Differential Method Automated       eGFR if  >60.0       eGFR if non  >60.0  Comment:  Calculation used to obtain the estimated glomerular filtration  rate (eGFR) is the CKD-EPI equation.          Eos # 0.2       Eosinophil % 3.4       Glucose 74        Gran # (ANC) 3.5       Gran % 51.5       Hematocrit 28.9       Hemoglobin 8.6       Immature Grans (Abs) 0.04  Comment:  Mild elevation in immature granulocytes is non specific and   can be seen in a variety of conditions including stress response,   acute inflammation, trauma and pregnancy. Correlation with other   laboratory and clinical findings is essential.         Immature Granulocytes 0.6       Lymph # 1.7       Lymph % 24.8       Magnesium 1.6       MCH 28.0       MCHC 29.8       MCV 94       Mono # 1.3       Mono % 19.4       MPV 10.0       nRBC 0       Phosphorus 2.7       Platelets 287       Potassium 3.5       PROTEIN TOTAL 5.2       RBC 3.07       RDW 16.9       Sodium 139       Vancomycin-Trough   12.5     WBC 6.82           All pertinent labs from the last 24 hours have been reviewed.    Significant Diagnostics:  I have reviewed all pertinent imaging results/findings within the past 24 hours.

## 2020-12-14 NOTE — OP NOTE
DATE OF SURGERY: 12/9/2020     PREOPERATIVE DIAGNOSIS:  1. 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 Morbid obesity     POSTOPERATIVE DIAGNOSIS:  Same.     PROCEDURE PERFORMED:  1. Corpectomy from right posterolateral extracavitary approach, T9 and T10  2. Application of expandable titanium corpectomy cage, T9-T10 (Globus)  3. Anterior fusion, T9 to T10  4. Posterior spinal fusion, T7-T12  5. Segmental fixation with pedicle screws and rods, T9-T12 (Depuy)  6. T9 and T10 laminectomy for decompression of thecal sac   7. Infuse, Vivigen and cadaveric bone grafting  8. Intraop CT navigation  9. Use of intraoperative neuromonitoring with MEPs  10. Use of intraoperative flouroscopy     SURGEON: Everardo Gongora M.D.     CO-SURGEON: Gera Monteiro M.D. -- Dr. Monteiro assisted with the placement of all spinal instrumentation because a qualified resident was not available for this portion of the procedure.    ANESTHESIA: GETA     ESTIMATED BLOOD LOSS: 400mL     COMPLICATIONS: None     DRAINS: Subfascial Hemovac x2     SPECIMENS SENT: Spinal cultures     FINDINGS: None     INDICATIONS:     70F with spondylodiscitis at T9 and T10 causing spinal instability. She was neurointact on exam. My recommendation was for a T9 and T10 corpectomy and PSIF from T7 to T12 to stabilize the spine and to debride the infection. R/B/A/I/M were reviewed in detail and she wished to proceed with surgery.     PROCEDURE:     The patient was brought to the operating room where she was intubated and placed under general anesthesia without difficulty.  All lines were placed.  All pressure points were appropriately padded.  AP and lateral x-rays were used to localize from T7-T12.  Lateral x-ray also clearly showed the spondylodiscitis at T9 and T10.The thoracic spine was marked prepped and draped in the usual sterile fashion.  A timeout was performed prior to the procedure.  10ccs of lidocaine with epinephrine were injected into the  skin.     A linear skin incision was made with the 10 blade from approximately T7-T12. Supra and subfascial midline dissection was carried out with Bovie electrocautery.  Subperiosteal dissection was carried out with Bovie electrocautery and Connor elevators to expose the posterior elements from approximately T7-T12.  At the end of our preliminary exposure we placed a pedicle marker into the presumed left T10 pedicle and confirmed levels on lateral x-ray. Medial facetectomies were then performed from T9-10 to T10-11 with the osteotome.     The CT array was docked onto the spinous process of T12 and a CT spin was acquired. It confirmed levels. Using navigation we placed screws bilaterally at T7, T8, T11 and T12. Xrays confirmed good position. Under flouroscopy we augmented bilateral T7 and T12 screws with cement. No significant extravasation was seen.     We then began our extra cavitary approach from the right side in preparation for our T9 and T10 corpectomies. First we performed a T9 and T10 laminectomy in standard fashion. Next, the right T9 and T10 ribs were exposed with Bovie electrocautery. Both rib heads were resected with the craniotome and Leksell rongeurs.  The T9 and T10 pedicles and transverse processes were resected with the Leksell. The right T9 and T10 exiting nerve roots were identified, ligated with silk sutures, and divided with scissors.  The thecal sac was then retracted medially to expose the T9 and T10 bodies and T9-10 disc space.  We performed a total T9-T10 disc debridement for infection and cultured the disc space. We performed partial corpectomies in standard fashion at T9 and T10, resecting more than 30% of each body. We irrigated the corpectomy defect with Betadine and packed it with Vancomycin. The corpectomy defect was sized and an expandable titanium corpectomy cage was countersunk into the defect.  The cage and defect were pre and post filled with Vivigen for anterior arthrodesis from  T9 to T10. Xrays showed good cage position.     Titanium rods were sequentially sized contoured and reduced into the tulip heads on both sides.  Set screws were finally tightened.  Final x-rays showed excellent position of the final construct.  The wound was copiously irrigated with sterile normal saline and a dilute Betadine solution.  Exposed bony surfaces from T7-T12 were decorticated with the high-speed drill.  Infuse and cancellous chips were used for posterior arthrodesis from T7 to T12.      A watertight fascial closure was achieved with interrupted 0 Vicryl sutures and a running Stratafix suture. Two deep Hemovacs were placed.  The soft tissues were closed in layers and the skin was closed with staples.  A Prevena woundvac was applied.     The patient appeared to tolerate the procedure well from a hemodynamic and neural monitoring standpoint.  Motor evoked potential were present in all extremities from the beginning of the case until the end. Dr. Monteiro and I were present for critical portions of the case.  At the end of the case all counts are correct.  She was repositioned supine onto the hospital bed where she was extubated, and sent to the ICU in stable condition for recovery.     JUSTIFICATION OF MODIFIER 22 AND COSURGEON:  This is a complex case of multi-level spondylodiscitis causing spinal instability in an elderly and morbidly obese patient.  It required significantly increased preoperative planning, mental effort, technical skill, and time to perform every aspect of this procedure.Two attending surgeons were indicated to reduce operative times, blood loss, and improve patient outcomes.

## 2020-12-14 NOTE — PLAN OF CARE
POC updated & reviewed with patient, verbalized understanding. Questions regarding POC were encouraged & addressed with patient. VSS, see flow-sheets. Patient is AAO x 4 at this time.  Fall/safety precautions maintained, no signs of injury noted during shift. Patient was repositioned for comfort, bed locked in low position, side rails up x 3, bed alarm set, and call light within reach. Patient was instructed to call staff for mobility, verbalized understanding. No acute signs of distress noted at this time.     Problem: Adult Inpatient Plan of Care  Goal: Plan of Care Review  Outcome: Ongoing, Progressing  Goal: Patient-Specific Goal (Individualization)  Outcome: Ongoing, Progressing  Goal: Absence of Hospital-Acquired Illness or Injury  Outcome: Ongoing, Progressing  Goal: Optimal Comfort and Wellbeing  Outcome: Ongoing, Progressing     Problem: Fluid Imbalance (Pneumonia)  Goal: Fluid Balance  Outcome: Ongoing, Progressing     Problem: Wound  Goal: Optimal Wound Healing  Outcome: Ongoing, Progressing     Problem: Fall Injury Risk  Goal: Absence of Fall and Fall-Related Injury  Outcome: Ongoing, Progressing

## 2020-12-14 NOTE — ASSESSMENT & PLAN NOTE
70 F with known MRSA bacteremia with thoracic osteomyelitis presenting as transfer from SNF after CT T spine concerning for increased erosion of T9/T10 vertebral bodies as well as bilateral T9 pedicle fractures now s/p T9-10 corpectomy with T7-12 PSIF on 12/8:    Neurologically stable on exam. Fitted tlso brace in place.      - q4h neuro checks  - Post op imaging pending when able to sit unassisted.   - Continue HV drain, will likely remove L HV today given decreased output. Continue WV throughout POD5.   - TLSO brace when up/OOB  - Pain control: scheduled Tylenol and Robaxin, Oxycodone PRN  - Recommend repeat TTE as outpatient given changes from 8/10 - 10/12. Pt may have had NSTEMI. Endocarditis felt less likely.  - H/o MRSA Bacteremia: Afebrile, no leukocytosis.    - ID following, appreciate assistance. Continue ceftaroline, started vancomycin. OR cultures remain NGTD.  - On 3L via nasal cannula, wean as tolerated to maintain O2 sats > 90% on RA  - Anemia: improved s/p transfusion 1U RBC 12/10, now 9.1/30, will ctm and transfuse for Hb < 7 or symptomatic at Hb < 8.  - CAD, s/p Cardiac Stent: MI in 2000 which required stent placements. Pt takes ASA and Plavix, last dose  11/30.   - Hold ASA/Plavix preoperatively  - YOVANI: CPAP qhs at home settings  - HTN: SBP goal <160. HR 60s, home dose metoprolol dc'd by NCC prior to stepdown, will continue to hold. Will resume home dose Imdur and Lisinopril as tolerated.  - Alzheimer's: Continue home memantine and donepezil  - HLD: Continue home pravastatin  - DVT prophylaxis: SHAYLA's, SCD's, SQH.   - Bowel regimen: senna BID     Contact Neurosurgery with any questions, concerns, or neuro changes.    Dispo: pending IPR

## 2020-12-14 NOTE — PROGRESS NOTES
[facility]  Neurosurgery  Progress Note    Subjective:     History of Present Illness: 70 y.o. female known to [facility] [place] [facility] with PMH of Alzheimer's dz, HTN, HLD, CAD, YOVANI, and morbid obesity who was recently hospitalized x2 for MRSA bacteremia complicated by pneumonia and possible UTI, treated with linozolid x8 days and cipro and discharged 10/1. She was then readmitted to [facility] on 10/8 with severe back pain. MRI revealed T9-10 osteodiscitis and T8-10 epidural phlegmon with associated paraverterbral abscesses. Spine infection likely hematogenous from under-treated bacteremia. Patient had no neurologic deficits on exam, no indications for emergent neurosurgical intervention, plan was made for conservative non-surgical treatment. She underwent needle aspiration of T9-10 disc space on 10/16, cultures were negative although she had already been undergoing antibiotic treatment. Repeat BCx remained no growth and she remained afebrile and neurologically stable. Patient was discharged to [facility] on 10/20 with plan for Vancomycin IV x 8 weeks (estimated end date 12/3).    Patient presents today after interval imaging obtained 11/30 revealed worsening bony destruction, unstable T9 fracture. Transferred for [facility].     Post-Op Info:  Procedure(s) (LRB):  T9-T10 corpectomy, posterior instrumented fusion T7-T12. (N/A)   5 Days Post-Op         Medications:  Continuous Infusions:    Scheduled Meds:   acetaminophen  650 mg Oral Q6H    bisacodyL  10 mg Rectal Daily    ceftaroline fosamil  600 mg Intravenous Q8H    donepeziL  10 mg Oral QHS    DULoxetine  60 mg Oral Daily    heparin (porcine)  5,000 Units Subcutaneous Q8H    isosorbide mononitrate  60 mg Oral Daily    lisinopriL  2.5 mg Oral Daily    memantine  10 mg Oral BID    methocarbamoL  750 mg Oral QID    mupirocin   Nasal BID    pantoprazole  40 mg Oral Before breakfast    pravastatin  40 mg Oral QHS    senna-docusate 8.6-50 mg  1  tablet Oral BID    vancomycin (VANCOCIN) IVPB  1,250 mg Intravenous Q24H     PRN Meds:sodium chloride, albuterol-ipratropium, aluminum-magnesium hydroxide-simethicone, dextrose 50%, dextrose 50%, glucagon (human recombinant), glucose, glucose, glucose, insulin aspart U-100, labetaloL, morphine, ondansetron, oxyCODONE, oxyCODONE, promethazine (PHENERGAN) IVPB, senna-docusate 8.6-50 mg, Pharmacy to dose Vancomycin consult **AND** vancomycin - pharmacy to dose     Review of Systems    Objective:     Weight: 125.4 kg (276 lb 7.3 oz)  Body mass index is 39.67 kg/m².  Vital Signs (Most Recent):  Temp: 98.3 °F (36.8 °C) (12/13/20 1630)  Pulse: 64 (12/13/20 1630)  Resp: 10 (12/13/20 1630)  BP: 127/68 (12/13/20 1630)  SpO2: 98 % (12/13/20 1630) Vital Signs (24h Range):  Temp:  [98 °F (36.7 °C)-98.6 °F (37 °C)] 98.3 °F (36.8 °C)  Pulse:  [61-74] 64  Resp:  [3-18] 10  SpO2:  [94 %-99 %] 98 %  BP: (113-147)/(56-73) 127/68     Date 12/13/20 0700 - 12/14/20 0659   Shift 0352-1921 5309-6773 7404-6102 24 Hour Total   INTAKE   P.O. 480 500  980   IV Piggyback 500 250  750   Shift Total(mL/kg) 980(7.8) 750(6)  1730(13.8)   OUTPUT   Urine(mL/kg/hr) 800(0.8)   800   Drains 0 0  0   Other 0 0  0   Shift Total(mL/kg) 800(6.4) 0(0)  800(6.4)   Weight (kg) 125.4 125.4 125.4 125.4                        Closed/Suction Drain 12/08/20 1239 Right Back Accordion 10 Fr. (Active)   Site Description Unable to view 12/10/20 0700   Dressing Type Biopatch in place 12/10/20 0700   Dressing Status Clean;Dry;Intact 12/10/20 0700   Dressing Intervention Integrity maintained 12/10/20 0700   Drainage Serosanguineous 12/10/20 0700   Status To bulb suction 12/10/20 0700   Output (mL) 35 mL 12/10/20 0605            Closed/Suction Drain 12/08/20 1239 Left Back Accordion 10 Fr. (Active)   Site Description Unable to view 12/10/20 0700   Dressing Type Biopatch in place 12/10/20 0700   Dressing Status Clean;Dry;Intact 12/10/20 0700   Dressing Intervention  Integrity maintained 12/10/20 0700   Drainage Serosanguineous 12/10/20 0700   Status To bulb suction 12/10/20 0700   Output (mL) 50 mL 12/10/20 0605       Female External Urinary Catheter 12/09/20 1741 (Active)   Skin no redness;no breakdown 12/10/20 0700   Tolerance no signs/symptoms of discomfort 12/10/20 0700   Suction Continuous suction at 40 mmHg 12/10/20 0700   Date of last wick change 12/10/20 12/10/20 0700   Output (mL) 700 mL 12/10/20 0505       Neurosurgery Physical Exam     Awake, alert, NAD  Brace in place  EOMI, PERRL  FS in BUE  Pain limited proximal leg weakness 3+/5, L>R  4/5 at knee bilaterally  5/5 distally  SILT    Significant Labs:  Recent Labs   Lab 12/12/20  0451 12/13/20  0518   GLU 73 77    140   K 3.6 3.1*    104   CO2 26 31*   BUN 7* 6*   CREATININE 0.6 0.7   CALCIUM 9.3 9.1   MG 1.7 1.6     Recent Labs   Lab 12/13/20  0739   WBC 7.62   HGB 8.9*   HCT 30.2*        No results for input(s): LABPT, INR, APTT in the last 48 hours.  Microbiology Results (last 7 days)     Procedure Component Value Units Date/Time    Aerobic culture [[id number]] Collected: 12/08/20 1139    Order Status: Completed Specimen: Back Updated: 12/12/20 1007     Aerobic Bacterial Culture No growth    Narrative:      T9-10 disc space    Aerobic culture [[id number]] Collected: 12/08/20 1139    Order Status: Completed Specimen: Back Updated: 12/11/20 1047     Aerobic Bacterial Culture No growth    Narrative:      T9-10 disc space    Culture, Anaerobe [[id number]] Collected: 12/08/20 1139    Order Status: Completed Specimen: Back Updated: 12/11/20 0915     Anaerobic Culture Culture in progress    Narrative:      T9-10 disc space    Culture, Anaerobe [[id number]] Collected: 12/08/20 1139    Order Status: Completed Specimen: Back Updated: 12/11/20 0915     Anaerobic Culture Culture in progress    Narrative:      T9-10 disc space    Fungus culture [[id number]] Collected: 12/08/20 1139    Order Status: Completed  Specimen: Back Updated: 12/10/20 1118     Fungus (Mycology) Culture Culture in progress    Narrative:      T9-10 disc space    Fungus culture [[id number]] Collected: 12/08/20 1139    Order Status: Completed Specimen: Back Updated: 12/10/20 1118     Fungus (Mycology) Culture Culture in progress    Narrative:      T9-10 disc space    Gram stain [[id number]] Collected: 12/08/20 1139    Order Status: Completed Specimen: Back Updated: 12/08/20 1652     Gram Stain Result Rare WBC's      No organisms seen    Narrative:      T9-10 disc space    Gram stain [[id number]] Collected: 12/08/20 1139    Order Status: Completed Specimen: Back Updated: 12/08/20 1651     Gram Stain Result Rare WBC's      No organisms seen    Narrative:      T9-10 disc space    Blood culture [[id number]] Collected: 12/02/20 0020    Order Status: Completed Specimen: Blood Updated: 12/07/20 0612     Blood Culture, Routine No growth after 5 days.          Significant Diagnostics:  No results found in the last 24 hours.      Assessment/Plan:     * Osteomyelitis of thoracic vertebra  70 F with known MRSA bacteremia with thoracic osteomyelitis presenting as transfer from SNF after CT T spine concerning for increased erosion of T9/T10 vertebral bodies as well as bilateral T9 pedicle fractures now s/p T9-10 corpectomy with T7-12 PSIF on 12/8:    Neurologically stable on exam. Fitted tlso brace in place.      - q4h neuro checks  - Post op imaging pending when able to sit unassisted.   - Continue HV drain, will likely remove L HV today given decreased output. Continue WV throughout POD5.   - TLSO brace when up/OOB  - Pain control: scheduled Tylenol and Robaxin, Oxycodone PRN  - Recommend repeat TTE as outpatient given changes from 8/10 - 10/12. Pt may have had NSTEMI. Endocarditis felt less likely.  - H/o MRSA Bacteremia: Afebrile, no leukocytosis.    - ID following, appreciate assistance. Continue ceftaroline, started vancomycin. OR cultures remain NGTD.  - On 3L  via nasal cannula, wean as tolerated to maintain O2 sats > 90% on RA  - Anemia: improved s/p transfusion 1U RBC 12/10, now 9.1/30, will ctm and transfuse for Hb < 7 or symptomatic at Hb < 8.  - CAD, s/p Cardiac Stent: MI in 2000 which required stent placements. Pt takes ASA and Plavix, last dose  11/30.   - Hold ASA/Plavix preoperatively  - YOVANI: CPAP qhs at home settings  - HTN: SBP goal <160. HR 60s, home dose metoprolol dc'd by NCC prior to stepdown, will continue to hold. Will resume home dose Imdur and Lisinopril as tolerated.  - Alzheimer's: Continue home memantine and donepezil  - HLD: Continue home pravastatin  - DVT prophylaxis: SHAYLA's, SCD's, SQH.   - Bowel regimen: senna BID     Contact Neurosurgery with any questions, concerns, or neuro changes.    Dispo: pending IPR    MRSA bacteremia            Sher Miller MD  Neurosurgery  [facility]

## 2020-12-14 NOTE — PLAN OF CARE
Problem: Physical Therapy Goal  Goal: Physical Therapy Goal  Description: Goals to be met by: 2020     Patient will increase functional independence with mobility by performin. Supine to sit with MInimal Assistance  2. Sit to supine with MInimal Assistance  3. Rolling to Left and Right with Minimal Assistance.  4. Bed to chair transfer with Maximum Assistance using Slideboard  5. Sitting at edge of bed x8 minutes with Stand-by Assistance  6. Lower extremity exercise program x30 reps per handout, with independence    Outcome: Ongoing, Progressing     Discharge Recommendations: Rehab    Pt requires max A of 2 to sit at the EOB.    Goals remain appropriate.     Viridiana Bermudez, PTA.   [phone]   2020

## 2020-12-14 NOTE — SUBJECTIVE & OBJECTIVE
Medications:  Continuous Infusions:    Scheduled Meds:   acetaminophen  650 mg Oral Q6H    bisacodyL  10 mg Rectal Daily    ceftaroline fosamil  600 mg Intravenous Q8H    donepeziL  10 mg Oral QHS    DULoxetine  60 mg Oral Daily    heparin (porcine)  5,000 Units Subcutaneous Q8H    isosorbide mononitrate  60 mg Oral Daily    lisinopriL  2.5 mg Oral Daily    memantine  10 mg Oral BID    methocarbamoL  750 mg Oral QID    mupirocin   Nasal BID    pantoprazole  40 mg Oral Before breakfast    pravastatin  40 mg Oral QHS    senna-docusate 8.6-50 mg  1 tablet Oral BID    vancomycin (VANCOCIN) IVPB  1,250 mg Intravenous Q24H     PRN Meds:sodium chloride, albuterol-ipratropium, aluminum-magnesium hydroxide-simethicone, dextrose 50%, dextrose 50%, glucagon (human recombinant), glucose, glucose, glucose, insulin aspart U-100, labetaloL, morphine, ondansetron, oxyCODONE, oxyCODONE, promethazine (PHENERGAN) IVPB, senna-docusate 8.6-50 mg, Pharmacy to dose Vancomycin consult **AND** vancomycin - pharmacy to dose     Review of Systems    Objective:     Weight: 125.4 kg (276 lb 7.3 oz)  Body mass index is 39.67 kg/m².  Vital Signs (Most Recent):  Temp: 98.3 °F (36.8 °C) (12/13/20 1630)  Pulse: 64 (12/13/20 1630)  Resp: 10 (12/13/20 1630)  BP: 127/68 (12/13/20 1630)  SpO2: 98 % (12/13/20 1630) Vital Signs (24h Range):  Temp:  [98 °F (36.7 °C)-98.6 °F (37 °C)] 98.3 °F (36.8 °C)  Pulse:  [61-74] 64  Resp:  [3-18] 10  SpO2:  [94 %-99 %] 98 %  BP: (113-147)/(56-73) 127/68     Date 12/13/20 0700 - 12/14/20 0659   Shift 9620-3191 4576-6167 9342-3840 24 Hour Total   INTAKE   P.O. 480 500  980   IV Piggyback 500 250  750   Shift Total(mL/kg) 980(7.8) 750(6)  1730(13.8)   OUTPUT   Urine(mL/kg/hr) 800(0.8)   800   Drains 0 0  0   Other 0 0  0   Shift Total(mL/kg) 800(6.4) 0(0)  800(6.4)   Weight (kg) 125.4 125.4 125.4 125.4                        Closed/Suction Drain 12/08/20 1239 Right Back Accordion 10 Fr. (Active)   Site  Description Unable to view 12/10/20 0700   Dressing Type Biopatch in place 12/10/20 0700   Dressing Status Clean;Dry;Intact 12/10/20 0700   Dressing Intervention Integrity maintained 12/10/20 0700   Drainage Serosanguineous 12/10/20 0700   Status To bulb suction 12/10/20 0700   Output (mL) 35 mL 12/10/20 0605            Closed/Suction Drain 12/08/20 1239 Left Back Accordion 10 Fr. (Active)   Site Description Unable to view 12/10/20 0700   Dressing Type Biopatch in place 12/10/20 0700   Dressing Status Clean;Dry;Intact 12/10/20 0700   Dressing Intervention Integrity maintained 12/10/20 0700   Drainage Serosanguineous 12/10/20 0700   Status To bulb suction 12/10/20 0700   Output (mL) 50 mL 12/10/20 0605       Female External Urinary Catheter 12/09/20 1741 (Active)   Skin no redness;no breakdown 12/10/20 0700   Tolerance no signs/symptoms of discomfort 12/10/20 0700   Suction Continuous suction at 40 mmHg 12/10/20 0700   Date of last wick change 12/10/20 12/10/20 0700   Output (mL) 700 mL 12/10/20 0505       Neurosurgery Physical Exam     Awake, alert, NAD  Brace in place  EOMI, PERRL  FS in BUE  Pain limited proximal leg weakness 3+/5, L>R  4/5 at knee bilaterally  5/5 distally  SILT    Significant Labs:  Recent Labs   Lab 12/12/20  0451 12/13/20  0518   GLU 73 77    140   K 3.6 3.1*    104   CO2 26 31*   BUN 7* 6*   CREATININE 0.6 0.7   CALCIUM 9.3 9.1   MG 1.7 1.6     Recent Labs   Lab 12/13/20  0739   WBC 7.62   HGB 8.9*   HCT 30.2*        No results for input(s): LABPT, INR, APTT in the last 48 hours.  Microbiology Results (last 7 days)     Procedure Component Value Units Date/Time    Aerobic culture [[id number]] Collected: 12/08/20 1139    Order Status: Completed Specimen: Back Updated: 12/12/20 1007     Aerobic Bacterial Culture No growth    Narrative:      T9-10 disc space    Aerobic culture [[id number]] Collected: 12/08/20 1139    Order Status: Completed Specimen: Back Updated: 12/11/20 1048      Aerobic Bacterial Culture No growth    Narrative:      T9-10 disc space    Culture, Anaerobe [[id number]] Collected: 12/08/20 1139    Order Status: Completed Specimen: Back Updated: 12/11/20 0915     Anaerobic Culture Culture in progress    Narrative:      T9-10 disc space    Culture, Anaerobe [[id number]] Collected: 12/08/20 1139    Order Status: Completed Specimen: Back Updated: 12/11/20 0915     Anaerobic Culture Culture in progress    Narrative:      T9-10 disc space    Fungus culture [[id number]] Collected: 12/08/20 1139    Order Status: Completed Specimen: Back Updated: 12/10/20 1118     Fungus (Mycology) Culture Culture in progress    Narrative:      T9-10 disc space    Fungus culture [[id number]] Collected: 12/08/20 1139    Order Status: Completed Specimen: Back Updated: 12/10/20 1118     Fungus (Mycology) Culture Culture in progress    Narrative:      T9-10 disc space    Gram stain [[id number]] Collected: 12/08/20 1139    Order Status: Completed Specimen: Back Updated: 12/08/20 1652     Gram Stain Result Rare WBC's      No organisms seen    Narrative:      T9-10 disc space    Gram stain [[id number]] Collected: 12/08/20 1139    Order Status: Completed Specimen: Back Updated: 12/08/20 1651     Gram Stain Result Rare WBC's      No organisms seen    Narrative:      T9-10 disc space    Blood culture [[id number]] Collected: 12/02/20 0020    Order Status: Completed Specimen: Blood Updated: 12/07/20 0612     Blood Culture, Routine No growth after 5 days.          Significant Diagnostics:  No results found in the last 24 hours.

## 2020-12-14 NOTE — OP NOTE
DATE OF SURGERY: 12/9/2020     PREOPERATIVE DIAGNOSIS:  1. 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 Morbid obesity     POSTOPERATIVE DIAGNOSIS:  Same.     PROCEDURE PERFORMED:  1. Corpectomy from right posterolateral extracavitary approach, T9 and T10  2. Application of expandable titanium corpectomy cage, T9-T10 (Globus)  3. Anterior fusion, T9 to T10  4. Posterior spinal fusion, T7-T12  5. Segmental fixation with pedicle screws and rods, T9-T12 (Depuy)  6. T9 and T10 laminectomy for decompression of thecal sac   7. Infuse, Vivigen and cadaveric bone grafting  8. Intraop CT navigation  9. Use of intraoperative neuromonitoring with MEPs  10. Use of intraoperative flouroscopy     SURGEON: Everardo Gongora M.D.     CO-SURGEON: Luis E Monteiro M.D. -- Dr. Monteiro assisted with the placement of all spinal instrumentation because a qualified resident was not available for this portion of the procedure.     ANESTHESIA: GETA     ESTIMATED BLOOD LOSS: 400mL     COMPLICATIONS: None     DRAINS: Subfascial Hemovac x2     SPECIMENS SENT: Spinal cultures     FINDINGS: None     INDICATIONS:     70F with spondylodiscitis at T9 and T10 causing spinal instability. She was neurointact on exam. My recommendation was for a T9 and T10 corpectomy and PSIF from T7 to T12 to stabilize the spine and to debride the infection. R/B/A/I/M were reviewed in detail and she wished to proceed with surgery.     PROCEDURE:     The patient was brought to the operating room where she was intubated and placed under general anesthesia without difficulty.  All lines were placed.  All pressure points were appropriately padded.  AP and lateral x-rays were used to localize from T7-T12.  Lateral x-ray also clearly showed the spondylodiscitis at T9 and T10.The thoracic spine was marked prepped and draped in the usual sterile fashion.  A timeout was performed prior to the procedure.  10ccs of lidocaine with epinephrine were injected into the  skin.     A linear skin incision was made with the 10 blade from approximately T7-T12. Supra and subfascial midline dissection was carried out with Bovie electrocautery.  Subperiosteal dissection was carried out with Bovie electrocautery and Connor elevators to expose the posterior elements from approximately T7-T12.  At the end of our preliminary exposure we placed a pedicle marker into the presumed left T10 pedicle and confirmed levels on lateral x-ray. Medial facetectomies were then performed from T9-10 to T10-11 with the osteotome.     The CT array was docked onto the spinous process of T12 and a CT spin was acquired. It confirmed levels. Using navigation we placed screws bilaterally at T7, T8, T11 and T12. Xrays confirmed good position. Under flouroscopy we augmented bilateral T7 and T12 screws with cement. No significant extravasation was seen.     We then began our extra cavitary approach from the right side in preparation for our T9 and T10 corpectomies. First we performed a T9 and T10 laminectomy in standard fashion. Next, the right T9 and T10 ribs were exposed with Bovie electrocautery. Both rib heads were resected with the craniotome and Leksell rongeurs.  The T9 and T10 pedicles and transverse processes were resected with the Leksell. The right T9 and T10 exiting nerve roots were identified, ligated with silk sutures, and divided with scissors.  The thecal sac was then retracted medially to expose the T9 and T10 bodies and T9-10 disc space.  We performed a total T9-T10 disc debridement for infection and cultured the disc space. We performed partial corpectomies in standard fashion at T9 and T10, resecting more than 30% of each body. We irrigated the corpectomy defect with Betadine and packed it with Vancomycin. The corpectomy defect was sized and an expandable titanium corpectomy cage was countersunk into the defect.  The cage and defect were pre and post filled with Vivigen for anterior arthrodesis from  T9 to T10. Xrays showed good cage position.     Titanium rods were sequentially sized contoured and reduced into the tulip heads on both sides.  Set screws were finally tightened.  Final x-rays showed excellent position of the final construct.  The wound was copiously irrigated with sterile normal saline and a dilute Betadine solution.  Exposed bony surfaces from T7-T12 were decorticated with the high-speed drill.  Infuse and cancellous chips were used for posterior arthrodesis from T7 to T12.      A watertight fascial closure was achieved with interrupted 0 Vicryl sutures and a running Stratafix suture. Two deep Hemovacs were placed.  The soft tissues were closed in layers and the skin was closed with staples.  A Prevena woundvac was applied.     The patient appeared to tolerate the procedure well from a hemodynamic and neural monitoring standpoint.  Motor evoked potential were present in all extremities from the beginning of the case until the end. Dr. Monteiro and I were present for critical portions of the case.  At the end of the case all counts are correct.  She was repositioned supine onto the hospital bed where she was extubated, and sent to the ICU in stable condition for recovery.     JUSTIFICATION OF MODIFIER 22 AND COSURGEON:  This is a complex case of multi-level spondylodiscitis causing spinal instability in an elderly and morbidly obese patient.  It required significantly increased preoperative planning, mental effort, technical skill, and time to perform every aspect of this procedure.Two attending surgeons were indicated to reduce operative times, blood loss, and improve patient outcomes.

## 2020-12-14 NOTE — PT/OT/SLP PROGRESS
"Physical Therapy Treatment    Patient Name:  Martina Betancourt   MRN:  [id number]  Admitting Diagnosis: Osteomyelitis of thoracic vertebra  Recent Surgery: Procedure(s) (LRB):  T9-T10 corpectomy, posterior instrumented fusion T7-T12. (N/A) 6 Days Post-Op    Recommendations:     Discharge Recommendations:  rehabilitation facility   Discharge Equipment Recommendations: (TBD)   Barriers to discharge: Inaccessible home and Decreased caregiver support    Plan:     During this hospitalization, patient to be seen 4 x/week to address the above listed problems via gait training, therapeutic activities, therapeutic exercises, neuromuscular re-education  · Plan of Care Expires:  01/08/21   Plan of Care Reviewed with: patient    This Plan of care has been discussed with the patient who was involved in its development and understands and is in agreement with the identified goals and treatment plan    Subjective     Communicated with nurse (Sushila) prior to session.     Patient comments: "I would be easier if you just let me lie down"  Pain/Comfort:  · Pain Rating 1: 6/10  · Location - Side 1: Bilateral  · Location - Orientation 1: lower  · Location 1: back  · Pain Addressed 1: Pre-medicate for activity, Reposition, Distraction, Cessation of Activity  · Pain Rating Post-Intervention 1: 10/10    Objective:     Patient found with: bed alarm, PureWick, PICC line, telemetry, SCD(waffle pad)    Patient found sup in bed upon PT entry to room, agreeable to treatment.  No family present in the room.    General Precautions: Standard, fall   Orthopedic Precautions:spinal precautions   Braces: TLSO       BED MOBILITY (vc's for hand placement sequencing of task):        Rolling to the R:  Max/mod A with rail.       Rolling to the L:  Max/mod A with rail.        Sup > sit at the EOB:  Max A of 2 for trunk and LE's, exiting on the R side.       Sit > sup:  Max A of 2 for trunk and LE's.       Scooting hips to EOB with max A of 2       Scooting " "hips along the EOB to the R requiring max A x4-5 scoots                       SITTING AT THE EDGE OF THE BED (15 min)   Assistance Level Required: SBA for trunk control with B4 UE support   Postural deviations noted: flexed trunk   Encouraged: upright posture                Pt requires assistance for donning TLSO at the EOB        TRANSFERS        Pt refused to attempt stating "not today"      EDUCATION  Patient provided with daily orientation and goals of this PT session. They were educated to call for assistance and to transfer with hospital staff only.  Also, pt was educated on the effects of prolonged immobility and the importance of performing OOB activity and exercises to promote healing and reduce recovery time      Whiteboard updated with correct mobility information. RN/PCT notified.  Pt requires max A of 2 to sit at the EOB.    Patient left supine, with  all lines intact, call button in reach, bed alarm on and nurse notified    AM-PAC 6 CLICK MOBILITY  Turning over in bed (including adjusting bedclothes, sheets and blankets)?: 2  Sitting down on and standing up from a chair with arms (e.g., wheelchair, bedside commode, etc.): 1  Moving from lying on back to sitting on the side of the bed?: 2  Moving to and from a bed to a chair (including a wheelchair)?: 1  Need to walk in hospital room?: 1  Climbing 3-5 steps with a railing?: 1  Basic Mobility Total Score: 8     Assessment:     Martina Betancourt is a 70 y.o. female admitted with a medical diagnosis of Osteomyelitis of thoracic vertebra.  She presents with the following impairments/functional limitations:  weakness, impaired endurance, impaired sensation, impaired self care skills, impaired functional mobilty, gait instability, impaired balance, decreased upper extremity function, decreased lower extremity function, pain, impaired coordination, impaired fine motor, impaired skin, edema, orthopedic precautions. requiring significant assistance and verbal " cues for bed mob, scooting to/along the EOB 2* weakness, pain and fatigue.   In light of pt's current functional level and deficits, it is anticipated that pt will need to participate in an intense rehab program consisting of PT and OT in order to achieve full rehab potential to return to previous level of function and roles.  Pt remains motivated to participate in PT session and will cont to benefit from skilled PT intervention.      Rehab Prognosis:  Fair; patient would benefit from acute skilled PT services to address these deficits and reach maximum level of function.      GOALS:   Multidisciplinary Problems     Physical Therapy Goals        Problem: Physical Therapy Goal    Goal Priority Disciplines Outcome Goal Variances Interventions   Physical Therapy Goal     PT, PT/OT Ongoing, Progressing     Description: Goals to be met by: 2020     Patient will increase functional independence with mobility by performin. Supine to sit with MInimal Assistance  2. Sit to supine with MInimal Assistance  3. Rolling to Left and Right with Minimal Assistance.  4. Bed to chair transfer with Maximum Assistance using Slideboard  5. Sitting at edge of bed x8 minutes with Stand-by Assistance  6. Lower extremity exercise program x30 reps per handout, with independence                     Time Tracking:     PT Received On: 20  PT Start Time: 1305     PT Stop Time: 1335  PT Total Time (min): 30 min     Billable Minutes: Therapeutic Activity 30    Treatment Type: Treatment  PT/PTA: PTA     PTA Visit Number: 3       Viridiana Bermudez PTA.  Pager [phone]    2020    .

## 2020-12-14 NOTE — PROGRESS NOTES
Pharmacokinetic Assessment Follow Up: IV Vancomycin    Vancomycin serum concentration assessment(s):    The trough level resulted as 12.5 mcg/mL (drawn ~12 min post 12/13 dose based on documentation) but can be used to guide therapy at this time.  The measurement is below the desired definitive target range of 15 to 20 mcg/mL.    Vancomycin Regimen Plan:    Continue Vancomycin 1250 mg IV every 24 hours (due to concerns for accumulation) with next serum trough concentration measured at 1700 prior to 3rd dose on 12/15/2020.    Drug levels (last 3 results):  Recent Labs   Lab Result Units 12/13/20  1752   Vancomycin-Trough ug/mL 12.5       Pharmacy will continue to follow and monitor vancomycin.    Please contact pharmacy at extension [phone] for questions regarding this assessment.    Thank you for the consult,   Jayne Beard       Patient brief summary:  Martina Betancourt is a 70 y.o. female initiated on antimicrobial therapy with IV Vancomycin for treatment of bone/joint infection      Drug Allergies:   Review of patient's allergies indicates:   Allergen Reactions    Hydroxyzine hcl     Tizanidine        Actual Body Weight:   141.3 kg    Renal Function:   Estimated Creatinine Clearance: 111.9 mL/min (based on SCr of 0.7 mg/dL).,     Dialysis Method (if applicable):  N/A    CBC (last 72 hours):  Recent Labs   Lab Result Units 12/13/20  0739 12/14/20  0409   WBC K/uL 7.62 6.82   Hemoglobin g/dL 8.9* 8.6*   Hematocrit % 30.2* 28.9*   Platelets K/uL 280 287   Gran % % 62.4 51.5   Lymph % % 16.5* 24.8   Mono % % 17.2* 19.4*   Eosinophil % % 3.0 3.4   Basophil % % 0.4 0.3   Differential Method  Automated Automated       Metabolic Panel (last 72 hours):  Recent Labs   Lab Result Units 12/12/20  0451 12/13/20  0518 12/14/20  0409   Sodium mmol/L 139 140 139   Potassium mmol/L 3.6 3.1* 3.5   Chloride mmol/L 105 104 104   CO2 mmol/L 26 31* 28   Glucose mg/dL 73 77 74   BUN mg/dL 7* 6* 5*   Creatinine mg/dL 0.6 0.7 0.7    Albumin g/dL 1.7* 1.7* 1.8*   Total Bilirubin mg/dL 0.3 0.2 0.2   Alkaline Phosphatase U/L 94 100 97   AST U/L 8* 8* 9*   ALT U/L 5* <5* 7*   Magnesium mg/dL 1.7 1.6 1.6   Phosphorus mg/dL 3.1 3.0 2.7       Vancomycin Administrations:  vancomycin given in the last 96 hours                   vancomycin 1.25 g in dextrose 5% 250 mL IVPB (ready to mix) (mg) 1,250 mg New Bag 12/13/20 1740     1,250 mg New Bag 12/12/20 1814     1,250 mg New Bag 12/11/20 1801    vancomycin 1.25 g in dextrose 5% 250 mL IVPB (ready to mix) (mg) 1,250 mg New Bag 12/10/20 1557                Microbiologic Results:  Microbiology Results (last 7 days)     Procedure Component Value Units Date/Time    Aerobic culture [[id number]] Collected: 12/08/20 1139    Order Status: Completed Specimen: Back Updated: 12/12/20 1007     Aerobic Bacterial Culture No growth    Narrative:      T9-10 disc space    Aerobic culture [[id number]] Collected: 12/08/20 1139    Order Status: Completed Specimen: Back Updated: 12/11/20 1047     Aerobic Bacterial Culture No growth    Narrative:      T9-10 disc space    Culture, Anaerobe [[id number]] Collected: 12/08/20 1139    Order Status: Completed Specimen: Back Updated: 12/11/20 0915     Anaerobic Culture Culture in progress    Narrative:      T9-10 disc space    Culture, Anaerobe [[id number]] Collected: 12/08/20 1139    Order Status: Completed Specimen: Back Updated: 12/11/20 0915     Anaerobic Culture Culture in progress    Narrative:      T9-10 disc space    Fungus culture [[id number]] Collected: 12/08/20 1139    Order Status: Completed Specimen: Back Updated: 12/10/20 1118     Fungus (Mycology) Culture Culture in progress    Narrative:      T9-10 disc space    Fungus culture [[id number]] Collected: 12/08/20 1139    Order Status: Completed Specimen: Back Updated: 12/10/20 1118     Fungus (Mycology) Culture Culture in progress    Narrative:      T9-10 disc space    Gram stain [[id number]] Collected: 12/08/20 1139    Order  Status: Completed Specimen: Back Updated: 12/08/20 1652     Gram Stain Result Rare WBC's      No organisms seen    Narrative:      T9-10 disc space    Gram stain [[id number]] Collected: 12/08/20 1139    Order Status: Completed Specimen: Back Updated: 12/08/20 1651     Gram Stain Result Rare WBC's      No organisms seen    Narrative:      T9-10 disc space

## 2020-12-14 NOTE — PLAN OF CARE
Patient rec is Rehab.  [facility].  CM sent referral to [facility] for Rehab.   12/14/20 1528   Discharge Reassessment   Assessment Type Discharge Planning Reassessment   Provided patient/caregiver education on the expected discharge date and the discharge plan Yes   Do you have any problems affording any of your prescribed medications? No   Discharge Plan A Rehab   Discharge Plan B Home with family;Home Health   DME Needed Upon Discharge  none   Patient choice form signed by patient/caregiver N/A   Anticipated Discharge Disposition Rehab   Can the patient/caregiver answer the patient profile reliably? Yes, cognitively intact   Describe the patient's ability to walk at the present time. Major restrictions/daily assistance from another person   Post-Acute Status   Post-Acute Authorization Placement   Post-Acute Placement Status Referrals Sent   Discharge Delays None known at this time

## 2020-12-15 LAB
ALBUMIN SERPL BCP-MCNC: 1.7 G/DL (ref 3.5–5.2)
ALP SERPL-CCNC: 100 U/L (ref 55–135)
ALT SERPL W/O P-5'-P-CCNC: <5 U/L (ref 10–44)
ANION GAP SERPL CALC-SCNC: 8 MMOL/L (ref 8–16)
AST SERPL-CCNC: 7 U/L (ref 10–40)
BACTERIA SPEC ANAEROBE CULT: NORMAL
BACTERIA SPEC ANAEROBE CULT: NORMAL
BASOPHILS # BLD AUTO: 0.04 K/UL (ref 0–0.2)
BASOPHILS NFR BLD: 0.6 % (ref 0–1.9)
BILIRUB SERPL-MCNC: 0.2 MG/DL (ref 0.1–1)
BUN SERPL-MCNC: 7 MG/DL (ref 8–23)
CALCIUM SERPL-MCNC: 8.9 MG/DL (ref 8.7–10.5)
CHLORIDE SERPL-SCNC: 105 MMOL/L (ref 95–110)
CO2 SERPL-SCNC: 27 MMOL/L (ref 23–29)
CREAT SERPL-MCNC: 0.7 MG/DL (ref 0.5–1.4)
DIFFERENTIAL METHOD: ABNORMAL
EOSINOPHIL # BLD AUTO: 0.3 K/UL (ref 0–0.5)
EOSINOPHIL NFR BLD: 3.6 % (ref 0–8)
ERYTHROCYTE [DISTWIDTH] IN BLOOD BY AUTOMATED COUNT: 16.8 % (ref 11.5–14.5)
EST. GFR  (AFRICAN AMERICAN): >60 ML/MIN/1.73 M^2
EST. GFR  (NON AFRICAN AMERICAN): >60 ML/MIN/1.73 M^2
GLUCOSE SERPL-MCNC: 73 MG/DL (ref 70–110)
HCT VFR BLD AUTO: 29.2 % (ref 37–48.5)
HGB BLD-MCNC: 8.6 G/DL (ref 12–16)
IMM GRANULOCYTES # BLD AUTO: 0.03 K/UL (ref 0–0.04)
IMM GRANULOCYTES NFR BLD AUTO: 0.4 % (ref 0–0.5)
LYMPHOCYTES # BLD AUTO: 1.6 K/UL (ref 1–4.8)
LYMPHOCYTES NFR BLD: 22.7 % (ref 18–48)
MAGNESIUM SERPL-MCNC: 1.6 MG/DL (ref 1.6–2.6)
MCH RBC QN AUTO: 27.9 PG (ref 27–31)
MCHC RBC AUTO-ENTMCNC: 29.5 G/DL (ref 32–36)
MCV RBC AUTO: 95 FL (ref 82–98)
MONOCYTES # BLD AUTO: 1.2 K/UL (ref 0.3–1)
MONOCYTES NFR BLD: 17 % (ref 4–15)
NEUTROPHILS # BLD AUTO: 3.9 K/UL (ref 1.8–7.7)
NEUTROPHILS NFR BLD: 55.7 % (ref 38–73)
NRBC BLD-RTO: 0 /100 WBC
PHOSPHATE SERPL-MCNC: 3.4 MG/DL (ref 2.7–4.5)
PLATELET # BLD AUTO: 306 K/UL (ref 150–350)
PMV BLD AUTO: 9.9 FL (ref 9.2–12.9)
POTASSIUM SERPL-SCNC: 3.4 MMOL/L (ref 3.5–5.1)
PROT SERPL-MCNC: 5.2 G/DL (ref 6–8.4)
RBC # BLD AUTO: 3.08 M/UL (ref 4–5.4)
SODIUM SERPL-SCNC: 140 MMOL/L (ref 136–145)
VANCOMYCIN TROUGH SERPL-MCNC: 12.9 UG/ML (ref 10–22)
WBC # BLD AUTO: 6.96 K/UL (ref 3.9–12.7)

## 2020-12-15 PROCEDURE — 25000003 PHARM REV CODE 250: Performed by: STUDENT IN AN ORGANIZED HEALTH CARE EDUCATION/TRAINING PROGRAM

## 2020-12-15 PROCEDURE — 83735 ASSAY OF MAGNESIUM: CPT

## 2020-12-15 PROCEDURE — 85025 COMPLETE CBC W/AUTO DIFF WBC: CPT

## 2020-12-15 PROCEDURE — 63600175 PHARM REV CODE 636 W HCPCS: Performed by: NEUROLOGICAL SURGERY

## 2020-12-15 PROCEDURE — 36415 COLL VENOUS BLD VENIPUNCTURE: CPT

## 2020-12-15 PROCEDURE — 25000003 PHARM REV CODE 250: Performed by: PHYSICIAN ASSISTANT

## 2020-12-15 PROCEDURE — 80053 COMPREHEN METABOLIC PANEL: CPT

## 2020-12-15 PROCEDURE — 11000001 HC ACUTE MED/SURG PRIVATE ROOM

## 2020-12-15 PROCEDURE — 63600175 PHARM REV CODE 636 W HCPCS: Mod: JG | Performed by: PHYSICIAN ASSISTANT

## 2020-12-15 PROCEDURE — 84100 ASSAY OF PHOSPHORUS: CPT

## 2020-12-15 PROCEDURE — 25000003 PHARM REV CODE 250: Performed by: NEUROLOGICAL SURGERY

## 2020-12-15 PROCEDURE — 63600175 PHARM REV CODE 636 W HCPCS: Performed by: PHYSICIAN ASSISTANT

## 2020-12-15 PROCEDURE — 80202 ASSAY OF VANCOMYCIN: CPT

## 2020-12-15 RX ORDER — FERROUS SULFATE 325(65) MG
325 TABLET, DELAYED RELEASE (ENTERIC COATED) ORAL NIGHTLY
Status: DISCONTINUED | OUTPATIENT
Start: 2020-12-15 | End: 2020-12-18 | Stop reason: HOSPADM

## 2020-12-15 RX ORDER — ASPIRIN 81 MG/1
81 TABLET ORAL DAILY
Status: DISCONTINUED | OUTPATIENT
Start: 2020-12-15 | End: 2020-12-18 | Stop reason: HOSPADM

## 2020-12-15 RX ORDER — CLOPIDOGREL BISULFATE 75 MG/1
75 TABLET ORAL DAILY
Status: DISCONTINUED | OUTPATIENT
Start: 2020-12-15 | End: 2020-12-18 | Stop reason: HOSPADM

## 2020-12-15 RX ORDER — POTASSIUM CHLORIDE 20 MEQ/1
40 TABLET, EXTENDED RELEASE ORAL ONCE
Status: COMPLETED | OUTPATIENT
Start: 2020-12-15 | End: 2020-12-15

## 2020-12-15 RX ADMIN — VANCOMYCIN HYDROCHLORIDE 1250 MG: 10 INJECTION, POWDER, LYOPHILIZED, FOR SOLUTION INTRAVENOUS at 10:12

## 2020-12-15 RX ADMIN — DONEPEZIL HYDROCHLORIDE 10 MG: 10 TABLET ORAL at 09:12

## 2020-12-15 RX ADMIN — ACETAMINOPHEN 650 MG: 325 TABLET ORAL at 05:12

## 2020-12-15 RX ADMIN — PANTOPRAZOLE SODIUM 40 MG: 40 TABLET, DELAYED RELEASE ORAL at 05:12

## 2020-12-15 RX ADMIN — ASPIRIN 81 MG: 81 TABLET, COATED ORAL at 09:12

## 2020-12-15 RX ADMIN — METHOCARBAMOL 750 MG: 750 TABLET ORAL at 12:12

## 2020-12-15 RX ADMIN — ISOSORBIDE MONONITRATE 60 MG: 30 TABLET, EXTENDED RELEASE ORAL at 08:12

## 2020-12-15 RX ADMIN — MEMANTINE HYDROCHLORIDE 10 MG: 10 TABLET ORAL at 08:12

## 2020-12-15 RX ADMIN — ACETAMINOPHEN 650 MG: 325 TABLET ORAL at 12:12

## 2020-12-15 RX ADMIN — PRAVASTATIN SODIUM 40 MG: 40 TABLET ORAL at 09:12

## 2020-12-15 RX ADMIN — CEFTAROLINE FOSAMIL 600 MG: 600 POWDER, FOR SOLUTION INTRAVENOUS at 05:12

## 2020-12-15 RX ADMIN — METHOCARBAMOL 750 MG: 750 TABLET ORAL at 09:12

## 2020-12-15 RX ADMIN — MEMANTINE HYDROCHLORIDE 10 MG: 10 TABLET ORAL at 09:12

## 2020-12-15 RX ADMIN — METHOCARBAMOL 750 MG: 750 TABLET ORAL at 05:12

## 2020-12-15 RX ADMIN — HEPARIN SODIUM 5000 UNITS: 5000 INJECTION INTRAVENOUS; SUBCUTANEOUS at 09:12

## 2020-12-15 RX ADMIN — POTASSIUM CHLORIDE 40 MEQ: 1500 TABLET, EXTENDED RELEASE ORAL at 03:12

## 2020-12-15 RX ADMIN — SENNOSIDES AND DOCUSATE SODIUM 1 TABLET: 8.6; 5 TABLET ORAL at 10:12

## 2020-12-15 RX ADMIN — HEPARIN SODIUM 5000 UNITS: 5000 INJECTION INTRAVENOUS; SUBCUTANEOUS at 02:12

## 2020-12-15 RX ADMIN — OXYCODONE HYDROCHLORIDE 5 MG: 5 TABLET ORAL at 05:12

## 2020-12-15 RX ADMIN — CEFTAROLINE FOSAMIL 600 MG: 600 POWDER, FOR SOLUTION INTRAVENOUS at 09:12

## 2020-12-15 RX ADMIN — HEPARIN SODIUM 5000 UNITS: 5000 INJECTION INTRAVENOUS; SUBCUTANEOUS at 05:12

## 2020-12-15 RX ADMIN — DULOXETINE HYDROCHLORIDE 60 MG: 60 CAPSULE, DELAYED RELEASE ORAL at 08:12

## 2020-12-15 RX ADMIN — CLOPIDOGREL 75 MG: 75 TABLET, FILM COATED ORAL at 09:12

## 2020-12-15 RX ADMIN — FERROUS SULFATE TAB EC 325 MG (65 MG FE EQUIVALENT) 325 MG: 325 (65 FE) TABLET DELAYED RESPONSE at 09:12

## 2020-12-15 RX ADMIN — CEFTAROLINE FOSAMIL 600 MG: 600 POWDER, FOR SOLUTION INTRAVENOUS at 12:12

## 2020-12-15 RX ADMIN — METHOCARBAMOL 750 MG: 750 TABLET ORAL at 08:12

## 2020-12-15 RX ADMIN — OXYCODONE HYDROCHLORIDE 5 MG: 5 TABLET ORAL at 09:12

## 2020-12-15 RX ADMIN — LISINOPRIL 2.5 MG: 2.5 TABLET ORAL at 09:12

## 2020-12-15 RX ADMIN — OXYCODONE HYDROCHLORIDE 10 MG: 10 TABLET ORAL at 02:12

## 2020-12-15 RX ADMIN — OXYCODONE HYDROCHLORIDE 10 MG: 10 TABLET ORAL at 08:12

## 2020-12-15 NOTE — PLAN OF CARE
POC updated & reviewed with patient, verbalized understanding. Questions regarding POC were encouraged & addressed with patient. VSS, see flow-sheets. Patient is AAO x 4 at this time.  Fall/safety precautions maintained, no signs of injury noted during shift. Patient was repositioned for comfort, bed locked in low position, side rails up x 3, bed alarm set, and call light within reach. Patient was instructed to call staff for mobility, verbalized understanding. Patient has no acute signs of distress noted at this time.    Problem: Adult Inpatient Plan of Care  Goal: Plan of Care Review  Outcome: Ongoing, Progressing  Goal: Absence of Hospital-Acquired Illness or Injury  Outcome: Ongoing, Progressing  Goal: Optimal Comfort and Wellbeing  Outcome: Ongoing, Progressing     Problem: Respiratory Compromise (Pneumonia)  Goal: Effective Oxygenation and Ventilation  Outcome: Ongoing, Progressing     Problem: Infection  Goal: Infection Symptom Resolution  Outcome: Ongoing, Progressing     Problem: Fall Injury Risk  Goal: Absence of Fall and Fall-Related Injury  Outcome: Ongoing, Progressing

## 2020-12-15 NOTE — ASSESSMENT & PLAN NOTE
70 F with known MRSA bacteremia with thoracic osteomyelitis presenting as transfer from SNF after CT T spine concerning for increased erosion of T9/T10 vertebral bodies as well as bilateral T9 pedicle fractures now s/p T9-10 corpectomy with T7-12 PSIF on 12/8:    Neurologically stable on exam. Fitted tlso brace in place.  - q4h neuro checks  - Post op imaging obtained when flat as patient is unable to sit up unassisted, satisfactory hardware placement.  - TLSO brace when up/OOB  - Pain control: scheduled Tylenol and Robaxin, Oxycodone PRN  - Recommend repeat TTE as outpatient given changes from 8/10 - 10/12. Pt may have had NSTEMI. Endocarditis felt less likely.  - H/o MRSA Bacteremia: Afebrile, no leukocytosis.    - ID following, appreciate assistance. Continue ceftaroline and vancomycin. Can discharge to rehab on vancomycin once therapeutic as [facility] does not carry ceftaroline. OR cultures remain NGTD.  - On 2L via nasal cannula, wean as tolerated to maintain O2 sats > 90% on RA  - Anemia: improved s/p transfusion 1U RBC 12/10, now stable. Will ctm and transfuse for Hb < 7 or symptomatic at Hb < 8.  - CAD, s/p Cardiac Stent: MI in 2000 which required stent placements. Pt takes ASA and Plavix, last dose  11/30. Resumed ASA/Plavix today.  - YOVANI: CPAP qhs at home settings  - HTN: SBP goal <160. HR 60s, home dose metoprolol dc'd by [facility] prior to stepdown, will continue to hold. Will resume home dose Imdur and Lisinopril as tolerated.  - Alzheimer's: Continue home memantine and donepezil  - HLD: Continue home pravastatin  - DVT prophylaxis: SHAYLA's, SCD's, SQH.   - Bowel regimen: senna BID     Contact Neurosurgery with any questions, concerns, or neuro changes.    Dispo: pending IPR, medically stable for discharge

## 2020-12-15 NOTE — PLAN OF CARE
Problem: Adult Inpatient Plan of Care  Goal: Plan of Care Review  Outcome: Ongoing, Progressing  Flowsheets (Taken 12/15/2020 1735)  Plan of Care Reviewed With: patient  Goal: Patient-Specific Goal (Individualization)  Outcome: Ongoing, Progressing  Flowsheets (Taken 12/15/2020 1735)  Individualized Care Needs: pain management  Anxieties, Fears or Concerns: Anxiety over pain management  Patient-Specific Goals (Include Timeframe): Discharge to rehab soon     Problem: Skin Injury Risk Increased  Goal: Skin Health and Integrity  Outcome: Ongoing, Progressing  Intervention: Optimize Skin Protection  Flowsheets (Taken 12/15/2020 1735)  Pressure Reduction Techniques:   frequent weight shift encouraged   weight shift assistance provided   positioned off wounds   pressure points protected  Pressure Reduction Devices:   positioning supports utilized   foam padding utilized   pressure-redistributing mattress utilized  Skin Protection:   adhesive use limited   electrode sites changed   incontinence pads utilized   pouching devices used   protective footwear used   pulse oximeter probe site changed   silicone foam dressing in place   transparent dressing maintained   tubing/devices free from skin contact  Head of Bed (HOB):   HOB at 30-45 degrees   HOB at 30 degrees  Intervention: Promote and Optimize Oral Intake  Flowsheets (Taken 12/15/2020 1735)  Oral Nutrition Promotion:   rest periods promoted   safe use of adaptive equipment encouraged     POC reviewed with Pt and Pt verbalized understanding of POC. All comments and concerns addressed. Pt progressing towards goals. Bed locked in lowest position with bed alarm set. PICC maintained, saline locked. Telemetry and visimobile in place. Around the clock dosing of PRN pain management this shift. VS and assessment in flowsheets. No acute events to report this shift. Will continue to monitor for changes to POC and clinical condition.

## 2020-12-15 NOTE — SUBJECTIVE & OBJECTIVE
Interval History:  NAEON. AFVSS. Slight improvement in pain control resulting in improved proximal pain limited weakness on exam. Medically stable for discharge to [facility]. Voiding spontaneously. Motivated to progress with therapy.       Medications:  Continuous Infusions:  Scheduled Meds:   acetaminophen  650 mg Oral Q6H    aspirin  81 mg Oral Daily    bisacodyL  10 mg Rectal Daily    ceftaroline fosamil  600 mg Intravenous Q8H    clopidogreL  75 mg Oral Daily    donepeziL  10 mg Oral QHS    DULoxetine  60 mg Oral Daily    ferrous sulfate  325 mg Oral QHS    heparin (porcine)  5,000 Units Subcutaneous Q8H    isosorbide mononitrate  60 mg Oral Daily    lisinopriL  2.5 mg Oral Daily    memantine  10 mg Oral BID    methocarbamoL  750 mg Oral QID    pantoprazole  40 mg Oral Before breakfast    pravastatin  40 mg Oral QHS    senna-docusate 8.6-50 mg  1 tablet Oral BID    vancomycin (VANCOCIN) IVPB  1,250 mg Intravenous Q24H     PRN Meds:sodium chloride, albuterol-ipratropium, aluminum-magnesium hydroxide-simethicone, dextrose 50%, dextrose 50%, glucagon (human recombinant), glucose, glucose, glucose, insulin aspart U-100, labetaloL, morphine, ondansetron, oxyCODONE, oxyCODONE, promethazine (PHENERGAN) IVPB, senna-docusate 8.6-50 mg, Pharmacy to dose Vancomycin consult **AND** vancomycin - pharmacy to dose       Objective:     Weight: (!) 141.3 kg (311 lb 8.2 oz)  Body mass index is 44.7 kg/m².  Vital Signs (Most Recent):  Temp: 98.9 °F (37.2 °C) (12/15/20 1108)  Pulse: 62 (12/15/20 1108)  Resp: 17 (12/15/20 1108)  BP: 135/66 (12/15/20 1108)  SpO2: 97 % (12/15/20 1108) Vital Signs (24h Range):  Temp:  [97.6 °F (36.4 °C)-98.9 °F (37.2 °C)] 98.9 °F (37.2 °C)  Pulse:  [58-89] 62  Resp:  [16-18] 17  SpO2:  [95 %-98 %] 97 %  BP: (123-142)/(61-78) 135/66     Date 12/15/20 0700 - 12/16/20 0659   Shift 4937-4855 3893-0694 5512-2374 24 Hour Total   INTAKE   Shift Total(mL/kg)       OUTPUT   Urine(mL/kg/hr) 300   300    Shift Total(mL/kg) 300(2.1)   300(2.1)   Weight (kg) 141.3 141.3 141.3 141.3                        Closed/Suction Drain 12/08/20 1239 Right Back Accordion 10 Fr. (Active)   Site Description Unable to view 12/13/20 2000   Dressing Type Transparent (Tegaderm) 12/13/20 2000   Dressing Status Clean;Dry;Intact 12/13/20 2000   Dressing Intervention Integrity maintained 12/13/20 2000   Drainage Serosanguineous 12/13/20 2000   Status To bulb suction 12/13/20 2000   Output (mL) 0 mL 12/13/20 1800            Closed/Suction Drain 12/08/20 1239 Left Back Accordion 10 Fr. (Active)   Site Description Unable to view 12/13/20 2000   Dressing Type Transparent (Tegaderm) 12/13/20 2000   Dressing Status Clean;Dry;Intact 12/13/20 2000   Dressing Intervention Integrity maintained 12/13/20 2000   Drainage Serosanguineous 12/13/20 2000   Status To bulb suction 12/13/20 2000   Output (mL) 0 mL 12/13/20 1800       Female External Urinary Catheter 12/09/20 1741 (Active)   Skin no redness;no breakdown;perineum cleansed w/ soap and water;female external urine collection device repositioned 12/15/20 0800   Tolerance no signs/symptoms of discomfort 12/15/20 0800   Suction Continuous suction at 70 mmHg 12/15/20 0800   Date of last wick change 12/15/20 12/15/20 0800   Time of last wick change 0800 12/15/20 0800   Output (mL) 300 mL 12/15/20 0800       Neurosurgery Physical Exam    General: well developed, well nourished, no distress.   Head: normocephalic, atraumatic  Neck: No tracheal deviation.  Neurologic: Alert and oriented. Thought content appropriate.  GCS: Motor: 6/Verbal: 5/Eyes: 4 GCS Total: 15  Mental Status: Awake, Alert, Oriented x 4  Language: No aphasia  Speech: No dysarthria  Cranial nerves: face symmetric, tongue midline, CN II-XII grossly intact.   Eyes: pupils equal, round, reactive to light with accomodation, EOMI.   Ears: No drainage.   Pulmonary: normal respirations, no signs of respiratory distress, nasal cannula in  place  Abdomen: soft, non-distended, not tender to palpation  Sensory: intact to light touch throughout  Motor Strength: Moves all extremities spontaneously with good tone.  Full strength upper and lower extremities. No abnormal movements seen.     Strength  Deltoids Triceps Biceps Wrist Extension Wrist Flexion Hand    Upper: R 5/5 5/5 5/5 5/5 5/5 5/5    L 5/5 5/5 5/5 5/5 5/5 5/5     Iliopsoas Quadriceps Knee  Flexion Tibialis  anterior Gastro- cnemius EHL   Lower: R 5/5 5/5 5/5 5/5 5/5 5/5    L 5/5 5/5 5/5 5/5 5/5 5/5     Vascular: No LE edema.   Skin: Skin is warm, dry and intact.    Incision c/d/I with skin edges well approximated with staples. No surrounding erythema or edema. No drainage from incision.         Significant Labs:  Recent Labs   Lab 12/14/20  0409 12/15/20  0422   GLU 74 73    140   K 3.5 3.4*    105   CO2 28 27   BUN 5* 7*   CREATININE 0.7 0.7   CALCIUM 8.7 8.9   MG 1.6 1.6     Recent Labs   Lab 12/14/20  0409 12/15/20  0422   WBC 6.82 6.96   HGB 8.6* 8.6*   HCT 28.9* 29.2*    306     No results for input(s): LABPT, INR, APTT in the last 48 hours.  Microbiology Results (last 7 days)     Procedure Component Value Units Date/Time    Culture, Anaerobe [[id number]] Collected: 12/08/20 1139    Order Status: Completed Specimen: Back Updated: 12/15/20 0740     Anaerobic Culture No anaerobes isolated    Narrative:      T9-10 disc space    Culture, Anaerobe [[id number]] Collected: 12/08/20 1139    Order Status: Completed Specimen: Back Updated: 12/15/20 0740     Anaerobic Culture No anaerobes isolated    Narrative:      T9-10 disc space    Aerobic culture [[id number]] Collected: 12/08/20 1139    Order Status: Completed Specimen: Back Updated: 12/12/20 1007     Aerobic Bacterial Culture No growth    Narrative:      T9-10 disc space    Aerobic culture [[id number]] Collected: 12/08/20 1139    Order Status: Completed Specimen: Back Updated: 12/11/20 1047     Aerobic Bacterial Culture No  growth    Narrative:      T9-10 disc space    Fungus culture [[id number]] Collected: 12/08/20 1139    Order Status: Completed Specimen: Back Updated: 12/10/20 1118     Fungus (Mycology) Culture Culture in progress    Narrative:      T9-10 disc space    Fungus culture [[id number]] Collected: 12/08/20 1139    Order Status: Completed Specimen: Back Updated: 12/10/20 1118     Fungus (Mycology) Culture Culture in progress    Narrative:      T9-10 disc space    Gram stain [[id number]] Collected: 12/08/20 1139    Order Status: Completed Specimen: Back Updated: 12/08/20 1652     Gram Stain Result Rare WBC's      No organisms seen    Narrative:      T9-10 disc space    Gram stain [[id number]] Collected: 12/08/20 1139    Order Status: Completed Specimen: Back Updated: 12/08/20 1651     Gram Stain Result Rare WBC's      No organisms seen    Narrative:      T9-10 disc space        Recent Lab Results       12/15/20  0422        Albumin 1.7     Alkaline Phosphatase 100     ALT <5     Anion Gap 8     AST 7     Baso # 0.04     Basophil % 0.6     BILIRUBIN TOTAL 0.2  Comment:  For infants and newborns, interpretation of results should be based  on gestational age, weight and in agreement with clinical  observations.  Premature Infant recommended reference ranges:  Up to 24 hours.............<8.0 mg/dL  Up to 48 hours............<12.0 mg/dL  3-5 days..................<15.0 mg/dL  6-29 days.................<15.0 mg/dL       BUN 7     Calcium 8.9     Chloride 105     CO2 27     Creatinine 0.7     Differential Method Automated     eGFR if African American >60.0     eGFR if non  >60.0  Comment:  Calculation used to obtain the estimated glomerular filtration  rate (eGFR) is the CKD-EPI equation.        Eos # 0.3     Eosinophil % 3.6     Glucose 73     Gran # (ANC) 3.9     Gran % 55.7     Hematocrit 29.2     Hemoglobin 8.6     Immature Grans (Abs) 0.03  Comment:  Mild elevation in immature granulocytes is non specific and   can  be seen in a variety of conditions including stress response,   acute inflammation, trauma and pregnancy. Correlation with other   laboratory and clinical findings is essential.       Immature Granulocytes 0.4     Lymph # 1.6     Lymph % 22.7     Magnesium 1.6     MCH 27.9     MCHC 29.5     MCV 95     Mono # 1.2     Mono % 17.0     MPV 9.9     nRBC 0     Phosphorus 3.4     Platelets 306     Potassium 3.4     PROTEIN TOTAL 5.2     RBC 3.08     RDW 16.8     Sodium 140     WBC 6.96         All pertinent labs from the last 24 hours have been reviewed.    Significant Diagnostics:  I have reviewed all pertinent imaging results/findings within the past 24 hours.

## 2020-12-15 NOTE — PROGRESS NOTES
[facility]  Neurosurgery  Progress Note    Subjective:     History of Present Illness: 70 y.o. female known to [facility] with PMH of Alzheimer's dz, HTN, HLD, CAD, YOVANI, and morbid obesity who was recently hospitalized x2 for MRSA bacteremia complicated by pneumonia and possible UTI, treated with linozolid x8 days and cipro and discharged 10/1. She was then readmitted to [facility] on 10/8 with severe back pain. MRI revealed T9-10 osteodiscitis and T8-10 epidural phlegmon with associated paraverterbral abscesses. Spine infection likely hematogenous from under-treated bacteremia. Patient had no neurologic deficits on exam, no indications for emergent neurosurgical intervention, plan was made for conservative non-surgical treatment. She underwent needle aspiration of T9-10 disc space on 10/16, cultures were negative although she had already been undergoing antibiotic treatment. Repeat BCx remained no growth and she remained afebrile and neurologically stable. Patient was discharged to [facility] on 10/20 with plan for Vancomycin IV x 8 weeks (estimated end date 12/3).    Patient presents today after interval imaging obtained 11/30 revealed worsening bony destruction, unstable T9 fracture. Transferred for [facility].     Post-Op Info:  Procedure(s) (LRB):  T9-T10 corpectomy, posterior instrumented fusion T7-T12. (N/A)   7 Days Post-Op     Interval History:  NAEON. AFVSS. Slight improvement in pain control resulting in improved proximal pain limited weakness on exam. Medically stable for discharge to [facility]. Voiding spontaneously. Motivated to progress with therapy.       Medications:  Continuous Infusions:  Scheduled Meds:   acetaminophen  650 mg Oral Q6H    aspirin  81 mg Oral Daily    bisacodyL  10 mg Rectal Daily    ceftaroline fosamil  600 mg Intravenous Q8H    clopidogreL  75 mg Oral Daily    donepeziL  10 mg Oral QHS    DULoxetine  60 mg Oral Daily    ferrous sulfate  325 mg Oral QHS     heparin (porcine)  5,000 Units Subcutaneous Q8H    isosorbide mononitrate  60 mg Oral Daily    lisinopriL  2.5 mg Oral Daily    memantine  10 mg Oral BID    methocarbamoL  750 mg Oral QID    pantoprazole  40 mg Oral Before breakfast    pravastatin  40 mg Oral QHS    senna-docusate 8.6-50 mg  1 tablet Oral BID    vancomycin (VANCOCIN) IVPB  1,250 mg Intravenous Q24H     PRN Meds:sodium chloride, albuterol-ipratropium, aluminum-magnesium hydroxide-simethicone, dextrose 50%, dextrose 50%, glucagon (human recombinant), glucose, glucose, glucose, insulin aspart U-100, labetaloL, morphine, ondansetron, oxyCODONE, oxyCODONE, promethazine (PHENERGAN) IVPB, senna-docusate 8.6-50 mg, Pharmacy to dose Vancomycin consult **AND** vancomycin - pharmacy to dose       Objective:     Weight: (!) 141.3 kg (311 lb 8.2 oz)  Body mass index is 44.7 kg/m².  Vital Signs (Most Recent):  Temp: 98.9 °F (37.2 °C) (12/15/20 1108)  Pulse: 62 (12/15/20 1108)  Resp: 17 (12/15/20 1108)  BP: 135/66 (12/15/20 1108)  SpO2: 97 % (12/15/20 1108) Vital Signs (24h Range):  Temp:  [97.6 °F (36.4 °C)-98.9 °F (37.2 °C)] 98.9 °F (37.2 °C)  Pulse:  [58-89] 62  Resp:  [16-18] 17  SpO2:  [95 %-98 %] 97 %  BP: (123-142)/(61-78) 135/66     Date 12/15/20 0700 - 12/16/20 0659   Shift 7772-3472 1474-4474 3828-3821 24 Hour Total   INTAKE   Shift Total(mL/kg)       OUTPUT   Urine(mL/kg/hr) 300   300   Shift Total(mL/kg) 300(2.1)   300(2.1)   Weight (kg) 141.3 141.3 141.3 141.3                        Closed/Suction Drain 12/08/20 1239 Right Back Accordion 10 Fr. (Active)   Site Description Unable to view 12/13/20 2000   Dressing Type Transparent (Tegaderm) 12/13/20 2000   Dressing Status Clean;Dry;Intact 12/13/20 2000   Dressing Intervention Integrity maintained 12/13/20 2000   Drainage Serosanguineous 12/13/20 2000   Status To bulb suction 12/13/20 2000   Output (mL) 0 mL 12/13/20 1800            Closed/Suction Drain 12/08/20 1239 Left Back Accordion 10 Fr.  (Active)   Site Description Unable to view 12/13/20 2000   Dressing Type Transparent (Tegaderm) 12/13/20 2000   Dressing Status Clean;Dry;Intact 12/13/20 2000   Dressing Intervention Integrity maintained 12/13/20 2000   Drainage Serosanguineous 12/13/20 2000   Status To bulb suction 12/13/20 2000   Output (mL) 0 mL 12/13/20 1800       Female External Urinary Catheter 12/09/20 1741 (Active)   Skin no redness;no breakdown;perineum cleansed w/ soap and water;female external urine collection device repositioned 12/15/20 0800   Tolerance no signs/symptoms of discomfort 12/15/20 0800   Suction Continuous suction at 70 mmHg 12/15/20 0800   Date of last wick change 12/15/20 12/15/20 0800   Time of last wick change 0800 12/15/20 0800   Output (mL) 300 mL 12/15/20 0800       Neurosurgery Physical Exam    General: well developed, well nourished, no distress.   Head: normocephalic, atraumatic  Neck: No tracheal deviation.  Neurologic: Alert and oriented. Thought content appropriate.  GCS: Motor: 6/Verbal: 5/Eyes: 4 GCS Total: 15  Mental Status: Awake, Alert, Oriented x 4  Language: No aphasia  Speech: No dysarthria  Cranial nerves: face symmetric, tongue midline, CN II-XII grossly intact.   Eyes: pupils equal, round, reactive to light with accomodation, EOMI.   Ears: No drainage.   Pulmonary: normal respirations, no signs of respiratory distress, nasal cannula in place  Abdomen: soft, non-distended, not tender to palpation  Sensory: intact to light touch throughout  Motor Strength: Moves all extremities spontaneously with good tone.  Full strength upper and lower extremities. No abnormal movements seen.     Strength  Deltoids Triceps Biceps Wrist Extension Wrist Flexion Hand    Upper: R 5/5 5/5 5/5 5/5 5/5 5/5    L 5/5 5/5 5/5 5/5 5/5 5/5     Iliopsoas Quadriceps Knee  Flexion Tibialis  anterior Gastro- cnemius EHL   Lower: R 5/5 5/5 5/5 5/5 5/5 5/5    L 5/5 5/5 5/5 5/5 5/5 5/5     Vascular: No LE edema.   Skin: Skin is  warm, dry and intact.    Incision c/d/I with skin edges well approximated with staples. No surrounding erythema or edema. No drainage from incision.         Significant Labs:  Recent Labs   Lab 12/14/20  0409 12/15/20  0422   GLU 74 73    140   K 3.5 3.4*    105   CO2 28 27   BUN 5* 7*   CREATININE 0.7 0.7   CALCIUM 8.7 8.9   MG 1.6 1.6     Recent Labs   Lab 12/14/20  0409 12/15/20  0422   WBC 6.82 6.96   HGB 8.6* 8.6*   HCT 28.9* 29.2*    306     No results for input(s): LABPT, INR, APTT in the last 48 hours.  Microbiology Results (last 7 days)     Procedure Component Value Units Date/Time    Culture, Anaerobe [[id number]] Collected: 12/08/20 1139    Order Status: Completed Specimen: Back Updated: 12/15/20 0740     Anaerobic Culture No anaerobes isolated    Narrative:      T9-10 disc space    Culture, Anaerobe [[id number]] Collected: 12/08/20 1139    Order Status: Completed Specimen: Back Updated: 12/15/20 0740     Anaerobic Culture No anaerobes isolated    Narrative:      T9-10 disc space    Aerobic culture [[id number]] Collected: 12/08/20 1139    Order Status: Completed Specimen: Back Updated: 12/12/20 1007     Aerobic Bacterial Culture No growth    Narrative:      T9-10 disc space    Aerobic culture [[id number]] Collected: 12/08/20 1139    Order Status: Completed Specimen: Back Updated: 12/11/20 1047     Aerobic Bacterial Culture No growth    Narrative:      T9-10 disc space    Fungus culture [[id number]] Collected: 12/08/20 1139    Order Status: Completed Specimen: Back Updated: 12/10/20 1118     Fungus (Mycology) Culture Culture in progress    Narrative:      T9-10 disc space    Fungus culture [[id number]] Collected: 12/08/20 1139    Order Status: Completed Specimen: Back Updated: 12/10/20 1118     Fungus (Mycology) Culture Culture in progress    Narrative:      T9-10 disc space    Gram stain [[id number]] Collected: 12/08/20 1139    Order Status: Completed Specimen: Back Updated: 12/08/20 1652      Gram Stain Result Rare WBC's      No organisms seen    Narrative:      T9-10 disc space    Gram stain [[id number]] Collected: 12/08/20 1139    Order Status: Completed Specimen: Back Updated: 12/08/20 1651     Gram Stain Result Rare WBC's      No organisms seen    Narrative:      T9-10 disc space        Recent Lab Results       12/15/20  0422        Albumin 1.7     Alkaline Phosphatase 100     ALT <5     Anion Gap 8     AST 7     Baso # 0.04     Basophil % 0.6     BILIRUBIN TOTAL 0.2  Comment:  For infants and newborns, interpretation of results should be based  on gestational age, weight and in agreement with clinical  observations.  Premature Infant recommended reference ranges:  Up to 24 hours.............<8.0 mg/dL  Up to 48 hours............<12.0 mg/dL  3-5 days..................<15.0 mg/dL  6-29 days.................<15.0 mg/dL       BUN 7     Calcium 8.9     Chloride 105     CO2 27     Creatinine 0.7     Differential Method Automated     eGFR if African American >60.0     eGFR if non  >60.0  Comment:  Calculation used to obtain the estimated glomerular filtration  rate (eGFR) is the CKD-EPI equation.        Eos # 0.3     Eosinophil % 3.6     Glucose 73     Gran # (ANC) 3.9     Gran % 55.7     Hematocrit 29.2     Hemoglobin 8.6     Immature Grans (Abs) 0.03  Comment:  Mild elevation in immature granulocytes is non specific and   can be seen in a variety of conditions including stress response,   acute inflammation, trauma and pregnancy. Correlation with other   laboratory and clinical findings is essential.       Immature Granulocytes 0.4     Lymph # 1.6     Lymph % 22.7     Magnesium 1.6     MCH 27.9     MCHC 29.5     MCV 95     Mono # 1.2     Mono % 17.0     MPV 9.9     nRBC 0     Phosphorus 3.4     Platelets 306     Potassium 3.4     PROTEIN TOTAL 5.2     RBC 3.08     RDW 16.8     Sodium 140     WBC 6.96         All pertinent labs from the last 24 hours have been reviewed.    Significant  Diagnostics:  I have reviewed all pertinent imaging results/findings within the past 24 hours.    Assessment/Plan:     * Osteomyelitis of thoracic vertebra  70 F with known MRSA bacteremia with thoracic osteomyelitis presenting as transfer from SNF after CT T spine concerning for increased erosion of T9/T10 vertebral bodies as well as bilateral T9 pedicle fractures now s/p T9-10 corpectomy with T7-12 PSIF on 12/8:    Neurologically stable on exam. Fitted tlso brace in place.  - q4h neuro checks  - Post op imaging obtained when flat as patient is unable to sit up unassisted, satisfactory hardware placement.  - TLSO brace when up/OOB  - Pain control: scheduled Tylenol and Robaxin, Oxycodone PRN  - Recommend repeat TTE as outpatient given changes from 8/10 - 10/12. Pt may have had NSTEMI. Endocarditis felt less likely.  - H/o MRSA Bacteremia: Afebrile, no leukocytosis.    - ID following, appreciate assistance. Continue ceftaroline and vancomycin. Can discharge to rehab on vancomycin once therapeutic as [facility] does not carry ceftaroline. OR cultures remain NGTD.  - On 2L via nasal cannula, wean as tolerated to maintain O2 sats > 90% on RA  - Anemia: improved s/p transfusion 1U RBC 12/10, now stable. Will ctm and transfuse for Hb < 7 or symptomatic at Hb < 8.  - CAD, s/p Cardiac Stent: MI in 2000 which required stent placements. Pt takes ASA and Plavix, last dose  11/30. Resumed ASA/Plavix today.  - YOVANI: CPAP qhs at home settings  - HTN: SBP goal <160. HR 60s, home dose metoprolol dc'd by [facility] prior to stepdown, will continue to hold. Will resume home dose Imdur and Lisinopril as tolerated.  - Alzheimer's: Continue home memantine and donepezil  - HLD: Continue home pravastatin  - DVT prophylaxis: SHAYLA's, SCD's, SQH.   - Bowel regimen: senna BID     Contact Neurosurgery with any questions, concerns, or neuro changes.    Dispo: pending IPR, medically stable for discharge    MRSA bacteremia            Carmen  BREONNA Metzger  Neurosurgery  [facility]

## 2020-12-15 NOTE — PLAN OF CARE
DUNG in communication with patient to advise that she was denied by [facility]. Referral had been sent to  [facility] who will be here to assess patient for Rehab. Patient had previously stated she did not want to go to Rehab but now understands the necessity.

## 2020-12-16 LAB
ALBUMIN SERPL BCP-MCNC: 1.7 G/DL (ref 3.5–5.2)
ALP SERPL-CCNC: 99 U/L (ref 55–135)
ALT SERPL W/O P-5'-P-CCNC: <5 U/L (ref 10–44)
ANION GAP SERPL CALC-SCNC: 7 MMOL/L (ref 8–16)
AST SERPL-CCNC: 9 U/L (ref 10–40)
BILIRUB SERPL-MCNC: 0.1 MG/DL (ref 0.1–1)
BUN SERPL-MCNC: 6 MG/DL (ref 8–23)
CALCIUM SERPL-MCNC: 8.9 MG/DL (ref 8.7–10.5)
CHLORIDE SERPL-SCNC: 104 MMOL/L (ref 95–110)
CO2 SERPL-SCNC: 29 MMOL/L (ref 23–29)
CREAT SERPL-MCNC: 0.8 MG/DL (ref 0.5–1.4)
EST. GFR  (AFRICAN AMERICAN): >60 ML/MIN/1.73 M^2
EST. GFR  (NON AFRICAN AMERICAN): >60 ML/MIN/1.73 M^2
GLUCOSE SERPL-MCNC: 90 MG/DL (ref 70–110)
POTASSIUM SERPL-SCNC: 3.4 MMOL/L (ref 3.5–5.1)
PROT SERPL-MCNC: 5.3 G/DL (ref 6–8.4)
SODIUM SERPL-SCNC: 140 MMOL/L (ref 136–145)

## 2020-12-16 PROCEDURE — 25000003 PHARM REV CODE 250: Performed by: PHYSICIAN ASSISTANT

## 2020-12-16 PROCEDURE — 27000221 HC OXYGEN, UP TO 24 HOURS

## 2020-12-16 PROCEDURE — 94760 N-INVAS EAR/PLS OXIMETRY 1: CPT

## 2020-12-16 PROCEDURE — 97530 THERAPEUTIC ACTIVITIES: CPT | Mod: CQ

## 2020-12-16 PROCEDURE — 63600175 PHARM REV CODE 636 W HCPCS: Mod: JG | Performed by: PHYSICIAN ASSISTANT

## 2020-12-16 PROCEDURE — 25000003 PHARM REV CODE 250: Performed by: NEUROLOGICAL SURGERY

## 2020-12-16 PROCEDURE — 80053 COMPREHEN METABOLIC PANEL: CPT

## 2020-12-16 PROCEDURE — 63600175 PHARM REV CODE 636 W HCPCS: Performed by: PHYSICIAN ASSISTANT

## 2020-12-16 PROCEDURE — 97530 THERAPEUTIC ACTIVITIES: CPT

## 2020-12-16 PROCEDURE — 99024 POSTOP FOLLOW-UP VISIT: CPT | Mod: POP,,, | Performed by: PHYSICIAN ASSISTANT

## 2020-12-16 PROCEDURE — 94761 N-INVAS EAR/PLS OXIMETRY MLT: CPT

## 2020-12-16 PROCEDURE — 99024 PR POST-OP FOLLOW-UP VISIT: ICD-10-PCS | Mod: POP,,, | Performed by: PHYSICIAN ASSISTANT

## 2020-12-16 PROCEDURE — 97535 SELF CARE MNGMENT TRAINING: CPT

## 2020-12-16 PROCEDURE — 11000001 HC ACUTE MED/SURG PRIVATE ROOM

## 2020-12-16 PROCEDURE — 25000003 PHARM REV CODE 250: Performed by: STUDENT IN AN ORGANIZED HEALTH CARE EDUCATION/TRAINING PROGRAM

## 2020-12-16 PROCEDURE — 63600175 PHARM REV CODE 636 W HCPCS: Performed by: NEUROLOGICAL SURGERY

## 2020-12-16 PROCEDURE — 99900035 HC TECH TIME PER 15 MIN (STAT)

## 2020-12-16 PROCEDURE — 36415 COLL VENOUS BLD VENIPUNCTURE: CPT

## 2020-12-16 RX ORDER — BACITRACIN ZINC 500 [USP'U]/G
OINTMENT TOPICAL 2 TIMES DAILY
Status: DISCONTINUED | OUTPATIENT
Start: 2020-12-16 | End: 2020-12-18 | Stop reason: HOSPADM

## 2020-12-16 RX ORDER — LANOLIN ALCOHOL/MO/W.PET/CERES
400 CREAM (GRAM) TOPICAL 2 TIMES DAILY
Status: COMPLETED | OUTPATIENT
Start: 2020-12-16 | End: 2020-12-16

## 2020-12-16 RX ORDER — POTASSIUM CHLORIDE 20 MEQ/1
40 TABLET, EXTENDED RELEASE ORAL 2 TIMES DAILY
Status: COMPLETED | OUTPATIENT
Start: 2020-12-16 | End: 2020-12-16

## 2020-12-16 RX ADMIN — HEPARIN SODIUM 5000 UNITS: 5000 INJECTION INTRAVENOUS; SUBCUTANEOUS at 09:12

## 2020-12-16 RX ADMIN — CEFTAROLINE FOSAMIL 600 MG: 600 POWDER, FOR SOLUTION INTRAVENOUS at 10:12

## 2020-12-16 RX ADMIN — Medication 400 MG: at 09:12

## 2020-12-16 RX ADMIN — METHOCARBAMOL 750 MG: 750 TABLET ORAL at 05:12

## 2020-12-16 RX ADMIN — MEMANTINE HYDROCHLORIDE 10 MG: 10 TABLET ORAL at 09:12

## 2020-12-16 RX ADMIN — METHOCARBAMOL 750 MG: 750 TABLET ORAL at 12:12

## 2020-12-16 RX ADMIN — OXYCODONE HYDROCHLORIDE 5 MG: 5 TABLET ORAL at 12:12

## 2020-12-16 RX ADMIN — CEFTAROLINE FOSAMIL 600 MG: 600 POWDER, FOR SOLUTION INTRAVENOUS at 12:12

## 2020-12-16 RX ADMIN — ACETAMINOPHEN 650 MG: 325 TABLET ORAL at 12:12

## 2020-12-16 RX ADMIN — ACETAMINOPHEN 650 MG: 325 TABLET ORAL at 05:12

## 2020-12-16 RX ADMIN — METHOCARBAMOL 750 MG: 750 TABLET ORAL at 09:12

## 2020-12-16 RX ADMIN — POTASSIUM CHLORIDE 40 MEQ: 1500 TABLET, EXTENDED RELEASE ORAL at 09:12

## 2020-12-16 RX ADMIN — ASPIRIN 81 MG: 81 TABLET, COATED ORAL at 09:12

## 2020-12-16 RX ADMIN — DULOXETINE HYDROCHLORIDE 60 MG: 60 CAPSULE, DELAYED RELEASE ORAL at 09:12

## 2020-12-16 RX ADMIN — CEFTAROLINE FOSAMIL 600 MG: 600 POWDER, FOR SOLUTION INTRAVENOUS at 05:12

## 2020-12-16 RX ADMIN — ISOSORBIDE MONONITRATE 60 MG: 30 TABLET, EXTENDED RELEASE ORAL at 09:12

## 2020-12-16 RX ADMIN — CLOPIDOGREL 75 MG: 75 TABLET, FILM COATED ORAL at 09:12

## 2020-12-16 RX ADMIN — FERROUS SULFATE TAB EC 325 MG (65 MG FE EQUIVALENT) 325 MG: 325 (65 FE) TABLET DELAYED RESPONSE at 09:12

## 2020-12-16 RX ADMIN — SENNOSIDES AND DOCUSATE SODIUM 1 TABLET: 8.6; 5 TABLET ORAL at 09:12

## 2020-12-16 RX ADMIN — HEPARIN SODIUM 5000 UNITS: 5000 INJECTION INTRAVENOUS; SUBCUTANEOUS at 02:12

## 2020-12-16 RX ADMIN — HEPARIN SODIUM 5000 UNITS: 5000 INJECTION INTRAVENOUS; SUBCUTANEOUS at 05:12

## 2020-12-16 RX ADMIN — PANTOPRAZOLE SODIUM 40 MG: 40 TABLET, DELAYED RELEASE ORAL at 05:12

## 2020-12-16 RX ADMIN — DONEPEZIL HYDROCHLORIDE 10 MG: 10 TABLET ORAL at 09:12

## 2020-12-16 RX ADMIN — LISINOPRIL 2.5 MG: 2.5 TABLET ORAL at 09:12

## 2020-12-16 RX ADMIN — VANCOMYCIN HYDROCHLORIDE 1500 MG: 1.5 INJECTION, POWDER, LYOPHILIZED, FOR SOLUTION INTRAVENOUS at 09:12

## 2020-12-16 RX ADMIN — PRAVASTATIN SODIUM 40 MG: 40 TABLET ORAL at 09:12

## 2020-12-16 RX ADMIN — OXYCODONE HYDROCHLORIDE 5 MG: 5 TABLET ORAL at 05:12

## 2020-12-16 RX ADMIN — BACITRACIN 1 EACH: 500 OINTMENT TOPICAL at 09:12

## 2020-12-16 NOTE — PLAN OF CARE
POC updated & reviewed with patient, verbalized understanding. Questions regarding POC were encouraged & addressed with patient. VSS, see flow-sheets. Patient is AAO x 4 at this time.  Fall/safety precautions maintained, no signs of injury noted during shift. Patient was repositioned for comfort, bed locked in low position, side rails up x 3, bed alarm set, and call light within reach. Patient was instructed to call staff for mobility, verbalized understanding. Patient shows no acute signs of distress at this time.     Problem: Adult Inpatient Plan of Care  Goal: Plan of Care Review  Outcome: Ongoing, Progressing  Goal: Patient-Specific Goal (Individualization)  Outcome: Ongoing, Progressing  Goal: Optimal Comfort and Wellbeing  Outcome: Ongoing, Progressing     Problem: Infection (Pneumonia)  Goal: Resolution of Infection Signs/Symptoms  Outcome: Ongoing, Progressing     Problem: Infection  Goal: Infection Symptom Resolution  Outcome: Ongoing, Progressing     Problem: Wound  Goal: Optimal Wound Healing  Outcome: Ongoing, Progressing     Problem: Skin Injury Risk Increased  Goal: Skin Health and Integrity  Outcome: Ongoing, Progressing

## 2020-12-16 NOTE — PROGRESS NOTES
Pharmacokinetic Assessment Follow Up: IV Vancomycin    Vancomycin Regimen Assessment and Plan:  - Vancomycin trough level (22.5-hour level) resulted at 12.9 mcg/mL, which is considered subtherapeutic (goal: 15-20 mcg/mL)  - Stable serum creatinine  - Increase to vancomycin 1500 mg IV every 24 hours   - Next vancomycin level scheduled for 12/18 at 2000 or sooner if change in renal function    Drug levels (last 3 results):  Recent Labs   Lab Result Units 12/13/20  1752 12/15/20  2041   Vancomycin-Trough ug/mL 12.5 12.9       Pharmacy will continue to follow and monitor vancomycin.    Please contact pharmacy at extension [phone] for questions regarding this assessment.    Thank you for the consult,   Kassie Aguero       Patient brief summary:  Martina Betancourt is a 70 y.o. female initiated on antimicrobial therapy with IV Vancomycin for treatment of bone/joint infection      Drug Allergies:   Review of patient's allergies indicates:   Allergen Reactions    Hydroxyzine hcl     Tizanidine        Actual Body Weight:   141.3 kg    Renal Function:   Estimated Creatinine Clearance: 111.9 mL/min (based on SCr of 0.7 mg/dL).    Dialysis Method (if applicable):  N/A

## 2020-12-16 NOTE — PLAN OF CARE
Problem: Physical Therapy Goal  Goal: Physical Therapy Goal  Description: Goals to be met by: 2020     Patient will increase functional independence with mobility by performin. Supine to sit with MInimal Assistance  2. Sit to supine with MInimal Assistance  3. Rolling to Left and Right with Minimal Assistance.  4. Bed to chair transfer with Maximum Assistance using Slideboard  5. Sitting at edge of bed x8 minutes with Stand-by Assistance  6. Lower extremity exercise program x30 reps per handout, with independence    Outcome: Ongoing, Progressing     Discharge Recommendations: Rehab    Pt requires max A of 2 to sit at the EOB.    Goals remain appropriate.     Viridiana Bermudez, PTA.   [phone]   2020

## 2020-12-16 NOTE — PT/OT/SLP PROGRESS
Occupational Therapy Treatment    Patient Name:  Martina Betancourt   MRN:  [id number]  Admit Date: 12/1/2020  Admitting Diagnosis:  Osteomyelitis of thoracic vertebra   Length of Stay: 15 days  Recent Surgery: Procedure(s) (LRB):  T9-T10 corpectomy, posterior instrumented fusion T7-T12. (N/A) 8 Days Post-Op    Recommendations:     Discharge Recommendations: rehabilitation facility  Discharge Equipment Recommendations:  (TBD (progress pending))  Barriers to discharge:  Decreased caregiver support, Inaccessible home environment, Other (Comment)(Increased assistance needed)    Plan:     Patient to be seen 4 x/week to address the above listed problems via self-care/home management, therapeutic activities, therapeutic exercises, neuromuscular re-education  · Plan of Care Expires: 01/09/21  · Plan of Care Reviewed with: patient    Assessment:   Martina Betancourt is a 70 y.o. female with a medical diagnosis of Osteomyelitis of thoracic vertebra.  She presents with the following performance deficits affecting function: weakness, impaired endurance, impaired self care skills, impaired functional mobilty, impaired sensation, gait instability, impaired balance, pain, decreased safety awareness, decreased lower extremity function, decreased upper extremity function, decreased coordination, impaired coordination, impaired fine motor, orthopedic precautions, decreased ROM.      Pt tolerated session fairly this date and was eager to participate. Demonstrating continued limited BLE functional mobility, increased pain, impaired endurance and decreased activity tolerance, requiring increased assistance and time to complete functional task. Pt found with BM upon session start, reports not being able to differentiate sensation from urine vs BM until after soiled; Total A required for pericare at bed level. Patient continues to be limited 2* increased pain with functional mobility. Patient is progressing towards established goals, and  "continues to benefit from acute skilled OT services to increase functional performance and improve quality of life. OT to continue to recommend Rehab at D/C to improve pt functional independence and increase patient safety before returning home.      Rehab Prognosis: Fair; patient would benefit from acute skilled OT services to address these deficits and reach maximum level of function.        Subjective   Communicated with: RN prior to session.  Patient found HOB elevated with bed alarm, PureWick, telemetry, SCD, PICC line upon OT entry to room. Pt agreeable to participate at this time.    Patient: "I can't do any more." -in response to additional stand trials    Pain/Comfort:  · Pain Rating 1: 0/10  · Location - Orientation 1: generalized  · Location 1: back  · Pain Addressed 1: Reposition, Pre-medicate for activity, Cessation of Activity  · Pain Rating Post-Intervention 1: 9/10    Objective:   Patient found with: bed alarm, PureWick, telemetry, SCD, PICC line   General Precautions: Standard, Cardiac fall   Orthopedic Precautions:spinal precautions   Braces: TLSO   Oxygen Device: Nasal Cannula 2L  Vitals: /78 (BP Location: Left arm, Patient Position: Lying)   Pulse 65   Temp 97.5 °F (36.4 °C) (Oral)   Resp 18   Ht 5' 10" (1.778 m)   Wt (!) 141.3 kg (311 lb 8.2 oz)   SpO2 97%   Breastfeeding No   BMI 44.70 kg/m²     Outcome Measures:  [facility] 6 Click ADL: 12    Cognition:   · Alert and Cooperative  · Command following: easily distracted by pain and follows one-step commands  · Communication: clear/fluent    Occupational Performance:  Bed Mobility:    · Patient completed Rolling/Turning to Left with  maximal assistance and 2 persons  · Patient completed Rolling/Turning to Right with maximal assistance and 2 persons  · Patient completed Supine to Sit with maximal assistance and 2 persons on R side of bed  · Scooting anteriorly to EOB to have both feet planted on floor: maximal assistance and 2 " persons  · Patient completed Sit to Supine with maximal assistance and 2 persons on R side of bed  · Scooting to HOB in supine: maximal assistance and 2 persons; with drawsheet pull    Functional Mobility/Transfers:   Static Sitting EOB: SBA   Dynamic Sitting EOB: SBA   Patient completed Sit <> Stand Transfer from EOB with maximal assistance  with  no assistive device and hand-held assist ; 2 trials completed; pt unable to obtain full stance 2* increased reports of back pain   Static Standing Balance: MAX A   Dynamic Standing Balance: MAX A   Patient completed functional mobility of R-leading lateral scoots to HOB with MAX A x 2 and cueing for technique      Activities of Daily Living:  · Upper Body Dressing: maximal assistance to don/doff TLSO seated EOB  · Toileting: total assistance to perform pericare at bed level    AMPA 6 Click ADL:  AMPA Total Score: 12    Treatment & Education:  -Pt education on OT role and POC.  -Importance of E/OOB activity with staff assistance, emphasis on daily participation  -Multiple self-care tasks and functional mobility completed -- assistance level noted above  -Safety during functional transfer and mobility ensured  -Patient provided with education on importance of Bilateral UB/LB integration during functional tasks for improvement in functional performance.   -Education provided/reviewed, questions answered within OT scope of practice.   -Patient demonstrates understanding and learning this date.         Patient left HOB elevated with all lines intact, call button in reach and bed alarm on    GOALS:   Multidisciplinary Problems     Occupational Therapy Goals        Problem: Occupational Therapy Goal    Goal Priority Disciplines Outcome Interventions   Occupational Therapy Goal     OT, PT/OT Ongoing, Progressing    Description: Goals to be met by: 12/23/20     Patient will increase functional independence with ADLs by performing:    Grooming while seated with Minimal  Assistance.  Sitting at edge of bed x10 minutes with Minimal Assistance.  Rolling to Bilateral with Moderate Assistance.   Supine to sit with Moderate Assistance in preparation for EOB/OOB functional activities.                     Time Tracking:     OT Date of Treatment: 12/16/20  OT Start Time: 1351  OT Stop Time: 1424  OT Total Time (min): 33 min  Additional staff present: PT      Billable Minutes:Self Care/Home Management 17 mins  Therapeutic Activity 16 mins      ELE Kilpatrick  12/16/2020

## 2020-12-16 NOTE — PROGRESS NOTES
[facility]  Neurosurgery  Progress Note    Subjective:     History of Present Illness: 70 y.o. female known to [facility] with PMH of Alzheimer's dz, HTN, HLD, CAD, YOVANI, and morbid obesity who was recently hospitalized x2 for MRSA bacteremia complicated by pneumonia and possible UTI, treated with linozolid x8 days and cipro and discharged 10/1. She was then readmitted to [facility] on 10/8 with severe back pain. MRI revealed T9-10 osteodiscitis and T8-10 epidural phlegmon with associated paraverterbral abscesses. Spine infection likely hematogenous from under-treated bacteremia. Patient had no neurologic deficits on exam, no indications for emergent neurosurgical intervention, plan was made for conservative non-surgical treatment. She underwent needle aspiration of T9-10 disc space on 10/16, cultures were negative although she had already been undergoing antibiotic treatment. Repeat BCx remained no growth and she remained afebrile and neurologically stable. Patient was discharged to [facility] on 10/20 with plan for Vancomycin IV x 8 weeks (estimated end date 12/3).    Patient presents today after interval imaging obtained 11/30 revealed worsening bony destruction, unstable T9 fracture. [facility]     Post-Op Info:  Procedure(s) (LRB):  T9-T10 corpectomy, posterior instrumented fusion T7-T12. (N/A)   8 Days Post-Op     Interval History: NAEON. AFVSS. Pt reports her back pain fluctuates, c/o of pain radiating to R hip which is not new. Controlled with current pain regimen. Denies any new/worsening weakness or numbness/paresthesias. Voiding spontaneously, BM yesterday. Has been able to stand with therapy. Last Vanc trough 12.9, plan for DC to rehab once therapeutic.    Medications:  Continuous Infusions:  Scheduled Meds:   acetaminophen  650 mg Oral Q6H    aspirin  81 mg Oral Daily    bacitracin zinc   Topical (Top) BID    bisacodyL  10 mg Rectal Daily    ceftaroline fosamil  600  mg Intravenous Q8H    clopidogreL  75 mg Oral Daily    donepeziL  10 mg Oral QHS    DULoxetine  60 mg Oral Daily    ferrous sulfate  325 mg Oral QHS    heparin (porcine)  5,000 Units Subcutaneous Q8H    isosorbide mononitrate  60 mg Oral Daily    lisinopriL  2.5 mg Oral Daily    magnesium oxide  400 mg Oral BID    memantine  10 mg Oral BID    methocarbamoL  750 mg Oral QID    pantoprazole  40 mg Oral Before breakfast    potassium chloride  40 mEq Oral BID    pravastatin  40 mg Oral QHS    senna-docusate 8.6-50 mg  1 tablet Oral BID    vancomycin (VANCOCIN) IVPB  1,500 mg Intravenous Q24H     PRN Meds:sodium chloride, albuterol-ipratropium, aluminum-magnesium hydroxide-simethicone, dextrose 50%, dextrose 50%, glucagon (human recombinant), glucose, glucose, glucose, insulin aspart U-100, labetaloL, morphine, ondansetron, oxyCODONE, oxyCODONE, promethazine (PHENERGAN) IVPB, senna-docusate 8.6-50 mg, Pharmacy to dose Vancomycin consult **AND** vancomycin - pharmacy to dose     Review of Systems  Objective:     Weight: (!) 141.3 kg (311 lb 8.2 oz)  Body mass index is 44.7 kg/m².  Vital Signs (Most Recent):  Temp: 96.8 °F (36 °C) (12/16/20 1206)  Pulse: 67 (12/16/20 1206)  Resp: 19 (12/16/20 1209)  BP: (!) 122/58 (12/16/20 1206)  SpO2: 96 % (12/16/20 1206) Vital Signs (24h Range):  Temp:  [96.8 °F (36 °C)-98.6 °F (37 °C)] 96.8 °F (36 °C)  Pulse:  [60-89] 67  Resp:  [15-19] 19  SpO2:  [92 %-98 %] 96 %  BP: (120-141)/(58-74) 122/58                          Closed/Suction Drain 12/08/20 1239 Right Back Accordion 10 Fr. (Active)   Site Description Unable to view 12/13/20 2000   Dressing Type Transparent (Tegaderm) 12/13/20 2000   Dressing Status Clean;Dry;Intact 12/13/20 2000   Dressing Intervention Integrity maintained 12/13/20 2000   Drainage Serosanguineous 12/13/20 2000   Status To bulb suction 12/13/20 2000   Output (mL) 0 mL 12/13/20 1800            Closed/Suction Drain 12/08/20 1239 Left Back Accordion  10 Fr. (Active)   Site Description Unable to view 12/13/20 2000   Dressing Type Transparent (Tegaderm) 12/13/20 2000   Dressing Status Clean;Dry;Intact 12/13/20 2000   Dressing Intervention Integrity maintained 12/13/20 2000   Drainage Serosanguineous 12/13/20 2000   Status To bulb suction 12/13/20 2000   Output (mL) 0 mL 12/13/20 1800       Female External Urinary Catheter 12/09/20 1741 (Active)   Skin no breakdown;perineum cleansed w/ soap and water;female external urine collection device repositioned;reddened 12/16/20 0800   Tolerance no signs/symptoms of discomfort 12/16/20 0800   Suction Continuous suction at 70 mmHg 12/16/20 0800   Date of last wick change 12/16/20 12/16/20 0800   Time of last wick change 0800 12/16/20 0800   Output (mL) 300 mL 12/15/20 0800       Neurosurgery Physical Exam    General: well developed, well nourished, no distress.   Head: normocephalic, atraumatic  Neck: No tracheal deviation.  Neurologic: Alert and oriented. Thought content appropriate.  GCS: Motor: 6/Verbal: 5/Eyes: 4 GCS Total: 15  Mental Status: Awake, Alert, Oriented x 4  Language: No aphasia  Speech: No dysarthria  Cranial nerves: face symmetric, tongue midline, CN II-XII grossly intact.   Eyes: pupils equal, round, reactive to light with accomodation, EOMI.   Ears: No drainage.   Pulmonary: normal respirations, no signs of respiratory distress, nasal cannula in place  Abdomen: soft, non-distended, not tender to palpation  Sensory: intact to light touch throughout  Motor Strength: Moves all extremities spontaneously with good tone.  Full strength upper and lower extremities. No abnormal movements seen.      Strength   Deltoids Triceps Biceps Wrist Extension Wrist Flexion Hand    Upper: R 5/5 5/5 5/5 5/5 5/5 5/5     L 5/5 5/5 5/5 5/5 5/5 5/5       Iliopsoas Quadriceps Knee  Flexion Tibialis  anterior Gastro- cnemius EHL   Lower: R 5/5 5/5 5/5 5/5 5/5 5/5     L 5/5 5/5 5/5 5/5 5/5 5/5      Vascular: No LE edema.   Skin:  Skin is warm, dry and intact.     Thoracic Incision c/d/I with skin edges well approximated with staples. No surrounding erythema or edema. No drainage from incision.       Significant Labs:  Recent Labs   Lab 12/15/20  0422 12/16/20  0337   GLU 73 90    140   K 3.4* 3.4*    104   CO2 27 29   BUN 7* 6*   CREATININE 0.7 0.8   CALCIUM 8.9 8.9   MG 1.6  --      Recent Labs   Lab 12/15/20  0422   WBC 6.96   HGB 8.6*   HCT 29.2*        No results for input(s): LABPT, INR, APTT in the last 48 hours.  Microbiology Results (last 7 days)     Procedure Component Value Units Date/Time    Culture, Anaerobe [[id number]] Collected: 12/08/20 1139    Order Status: Completed Specimen: Back Updated: 12/15/20 0740     Anaerobic Culture No anaerobes isolated    Narrative:      T9-10 disc space    Culture, Anaerobe [[id number]] Collected: 12/08/20 1139    Order Status: Completed Specimen: Back Updated: 12/15/20 0740     Anaerobic Culture No anaerobes isolated    Narrative:      T9-10 disc space    Aerobic culture [[id number]] Collected: 12/08/20 1139    Order Status: Completed Specimen: Back Updated: 12/12/20 1007     Aerobic Bacterial Culture No growth    Narrative:      T9-10 disc space    Aerobic culture [[id number]] Collected: 12/08/20 1139    Order Status: Completed Specimen: Back Updated: 12/11/20 1047     Aerobic Bacterial Culture No growth    Narrative:      T9-10 disc space    Fungus culture [[id number]] Collected: 12/08/20 1139    Order Status: Completed Specimen: Back Updated: 12/10/20 1118     Fungus (Mycology) Culture Culture in progress    Narrative:      T9-10 disc space    Fungus culture [[id number]] Collected: 12/08/20 1139    Order Status: Completed Specimen: Back Updated: 12/10/20 1118     Fungus (Mycology) Culture Culture in progress    Narrative:      T9-10 disc space        All pertinent labs from the last 24 hours have been reviewed.    Significant Diagnostics:  I have reviewed and interpreted all  pertinent imaging results/findings within the past 24 hours.    Assessment/Plan:     * Osteomyelitis of thoracic vertebra  70 F with known MRSA bacteremia with thoracic osteomyelitis presenting as transfer from SNF after CT T spine concerning for increased erosion of T9/T10 vertebral bodies as well as bilateral T9 pedicle fractures now s/p T9-10 corpectomy with T7-12 PSIF on 12/8:    Neurologically stable on exam.   - q4h neuro checks  - Post op imaging obtained when flat as patient is unable to sit up unassisted, satisfactory hardware placement.  - TLSO brace when up/OOB  - Keep incision open to air, bacitracin BID. Staples to be removed 12/22.  - Pain control: scheduled Tylenol and Robaxin, Oxycodone PRN  - Recommend repeat TTE as outpatient given changes from 8/10 - 10/12. Pt may have had NSTEMI. Endocarditis felt less likely.   - Will place ambulatory referral to Cardiology at discharge  - H/o MRSA Bacteremia: Afebrile, no leukocytosis. OR cultures remain NGTD. ID consulted, appreciate assistance.    - Continue ceftaroline and vancomycin. Discontinue ceftaroline once vancomycin trough therapeutic at 15-20. Can discharge to rehab on vancomycin once therapeutic as [facility] does not carry ceftaroline.    - Plan on Vanc x 6 weeks from sx (BRIT 1/19/2021) then suppression with Doxycycline   - Hypoxia: CXR 12/13 shows airspace disease, left>right. No pleural effusion.    - Supplemental oxygen via NC as needed.    - Wean oxygen to maintain O2 sats > 90%. Encourage IS use.  - Anemia: improved s/p transfusion 1U RBC 12/10, now stable. Will ctm and transfuse for Hb < 7 or symptomatic at Hb < 8.  - CAD, s/p Cardiac Stent: MI in 2000 which required stent placements. Pt takes ASA and Plavix, last dose  11/30. Resumed ASA/Plavix 12/15.  - YOVANI: CPAP qhs at home settings  - HTN: SBP goal <160. HR 60s, home dose metoprolol dc'd by [facility] prior to stepdown, will continue to hold. Continue home dose Imdur and  Lisinopril.  - Alzheimer's: Continue home memantine and donepezil  - HLD: Continue home pravastatin  - DVT prophylaxis: SHAYLA's, SCD's, SQH. Screening BLE U/S 12/11 negative for DVT.  - Bowel regimen: senna BID     Contact Neurosurgery with any questions, concerns, or neuro changes.    Dispo: pending IPR, medically stable for discharge once therapeutic on vancomycin.    MRSA bacteremia            Georgina Morrison PADiannaC  Neurosurgery  [facility]

## 2020-12-16 NOTE — SUBJECTIVE & OBJECTIVE
Interval History: NAEON. AFVSS. Pt reports her back pain fluctuates, c/o of pain radiating to R hip which is not new. Controlled with current pain regimen. Denies any new/worsening weakness or numbness/paresthesias. Voiding spontaneously, BM yesterday. Has been able to stand with therapy. Last Vanc trough 12.9, plan for DC to rehab once therapeutic.    Medications:  Continuous Infusions:  Scheduled Meds:   acetaminophen  650 mg Oral Q6H    aspirin  81 mg Oral Daily    bacitracin zinc   Topical (Top) BID    bisacodyL  10 mg Rectal Daily    ceftaroline fosamil  600 mg Intravenous Q8H    clopidogreL  75 mg Oral Daily    donepeziL  10 mg Oral QHS    DULoxetine  60 mg Oral Daily    ferrous sulfate  325 mg Oral QHS    heparin (porcine)  5,000 Units Subcutaneous Q8H    isosorbide mononitrate  60 mg Oral Daily    lisinopriL  2.5 mg Oral Daily    magnesium oxide  400 mg Oral BID    memantine  10 mg Oral BID    methocarbamoL  750 mg Oral QID    pantoprazole  40 mg Oral Before breakfast    potassium chloride  40 mEq Oral BID    pravastatin  40 mg Oral QHS    senna-docusate 8.6-50 mg  1 tablet Oral BID    vancomycin (VANCOCIN) IVPB  1,500 mg Intravenous Q24H     PRN Meds:sodium chloride, albuterol-ipratropium, aluminum-magnesium hydroxide-simethicone, dextrose 50%, dextrose 50%, glucagon (human recombinant), glucose, glucose, glucose, insulin aspart U-100, labetaloL, morphine, ondansetron, oxyCODONE, oxyCODONE, promethazine (PHENERGAN) IVPB, senna-docusate 8.6-50 mg, Pharmacy to dose Vancomycin consult **AND** vancomycin - pharmacy to dose     Review of Systems  Objective:     Weight: (!) 141.3 kg (311 lb 8.2 oz)  Body mass index is 44.7 kg/m².  Vital Signs (Most Recent):  Temp: 96.8 °F (36 °C) (12/16/20 1206)  Pulse: 67 (12/16/20 1206)  Resp: 19 (12/16/20 1209)  BP: (!) 122/58 (12/16/20 1206)  SpO2: 96 % (12/16/20 1206) Vital Signs (24h Range):  Temp:  [96.8 °F (36 °C)-98.6 °F (37 °C)] 96.8 °F (36  °C)  Pulse:  [60-89] 67  Resp:  [15-19] 19  SpO2:  [92 %-98 %] 96 %  BP: (120-141)/(58-74) 122/58                          Closed/Suction Drain 12/08/20 1239 Right Back Accordion 10 Fr. (Active)   Site Description Unable to view 12/13/20 2000   Dressing Type Transparent (Tegaderm) 12/13/20 2000   Dressing Status Clean;Dry;Intact 12/13/20 2000   Dressing Intervention Integrity maintained 12/13/20 2000   Drainage Serosanguineous 12/13/20 2000   Status To bulb suction 12/13/20 2000   Output (mL) 0 mL 12/13/20 1800            Closed/Suction Drain 12/08/20 1239 Left Back Accordion 10 Fr. (Active)   Site Description Unable to view 12/13/20 2000   Dressing Type Transparent (Tegaderm) 12/13/20 2000   Dressing Status Clean;Dry;Intact 12/13/20 2000   Dressing Intervention Integrity maintained 12/13/20 2000   Drainage Serosanguineous 12/13/20 2000   Status To bulb suction 12/13/20 2000   Output (mL) 0 mL 12/13/20 1800       Female External Urinary Catheter 12/09/20 1741 (Active)   Skin no breakdown;perineum cleansed w/ soap and water;female external urine collection device repositioned;reddened 12/16/20 0800   Tolerance no signs/symptoms of discomfort 12/16/20 0800   Suction Continuous suction at 70 mmHg 12/16/20 0800   Date of last wick change 12/16/20 12/16/20 0800   Time of last wick change 0800 12/16/20 0800   Output (mL) 300 mL 12/15/20 0800       Neurosurgery Physical Exam    General: well developed, well nourished, no distress.   Head: normocephalic, atraumatic  Neck: No tracheal deviation.  Neurologic: Alert and oriented. Thought content appropriate.  GCS: Motor: 6/Verbal: 5/Eyes: 4 GCS Total: 15  Mental Status: Awake, Alert, Oriented x 4  Language: No aphasia  Speech: No dysarthria  Cranial nerves: face symmetric, tongue midline, CN II-XII grossly intact.   Eyes: pupils equal, round, reactive to light with accomodation, EOMI.   Ears: No drainage.   Pulmonary: normal respirations, no signs of respiratory distress,  nasal cannula in place  Abdomen: soft, non-distended, not tender to palpation  Sensory: intact to light touch throughout  Motor Strength: Moves all extremities spontaneously with good tone.  Full strength upper and lower extremities. No abnormal movements seen.      Strength   Deltoids Triceps Biceps Wrist Extension Wrist Flexion Hand    Upper: R 5/5 5/5 5/5 5/5 5/5 5/5     L 5/5 5/5 5/5 5/5 5/5 5/5       Iliopsoas Quadriceps Knee  Flexion Tibialis  anterior Gastro- cnemius EHL   Lower: R 5/5 5/5 5/5 5/5 5/5 5/5     L 5/5 5/5 5/5 5/5 5/5 5/5      Vascular: No LE edema.   Skin: Skin is warm, dry and intact.     Thoracic Incision c/d/I with skin edges well approximated with staples. No surrounding erythema or edema. No drainage from incision.       Significant Labs:  Recent Labs   Lab 12/15/20  0422 12/16/20  0337   GLU 73 90    140   K 3.4* 3.4*    104   CO2 27 29   BUN 7* 6*   CREATININE 0.7 0.8   CALCIUM 8.9 8.9   MG 1.6  --      Recent Labs   Lab 12/15/20  0422   WBC 6.96   HGB 8.6*   HCT 29.2*        No results for input(s): LABPT, INR, APTT in the last 48 hours.  Microbiology Results (last 7 days)     Procedure Component Value Units Date/Time    Culture, Anaerobe [[id number]] Collected: 12/08/20 1139    Order Status: Completed Specimen: Back Updated: 12/15/20 0740     Anaerobic Culture No anaerobes isolated    Narrative:      T9-10 disc space    Culture, Anaerobe [[id number]] Collected: 12/08/20 1139    Order Status: Completed Specimen: Back Updated: 12/15/20 0740     Anaerobic Culture No anaerobes isolated    Narrative:      T9-10 disc space    Aerobic culture [[id number]] Collected: 12/08/20 1139    Order Status: Completed Specimen: Back Updated: 12/12/20 1007     Aerobic Bacterial Culture No growth    Narrative:      T9-10 disc space    Aerobic culture [[id number]] Collected: 12/08/20 1139    Order Status: Completed Specimen: Back Updated: 12/11/20 1047     Aerobic Bacterial Culture No  growth    Narrative:      T9-10 disc space    Fungus culture [[id number]] Collected: 12/08/20 1139    Order Status: Completed Specimen: Back Updated: 12/10/20 1118     Fungus (Mycology) Culture Culture in progress    Narrative:      T9-10 disc space    Fungus culture [[id number]] Collected: 12/08/20 1139    Order Status: Completed Specimen: Back Updated: 12/10/20 1118     Fungus (Mycology) Culture Culture in progress    Narrative:      T9-10 disc space        All pertinent labs from the last 24 hours have been reviewed.    Significant Diagnostics:  I have reviewed and interpreted all pertinent imaging results/findings within the past 24 hours.

## 2020-12-16 NOTE — ASSESSMENT & PLAN NOTE
70 F with known MRSA bacteremia with thoracic osteomyelitis presenting as transfer from SNF after CT T spine concerning for increased erosion of T9/T10 vertebral bodies as well as bilateral T9 pedicle fractures now s/p T9-10 corpectomy with T7-12 PSIF on 12/8:    Neurologically stable on exam.   - q4h neuro checks  - Post op imaging obtained when flat as patient is unable to sit up unassisted, satisfactory hardware placement.  - TLSO brace when up/OOB  - Keep incision open to air, bacitracin BID. Staples to be removed 12/22.  - Pain control: scheduled Tylenol and Robaxin, Oxycodone PRN  - Recommend repeat TTE as outpatient given changes from 8/10 - 10/12. Pt may have had NSTEMI. Endocarditis felt less likely.   - Will place ambulatory referral to Cardiology at discharge  - H/o MRSA Bacteremia: Afebrile, no leukocytosis. OR cultures remain NGTD. ID consulted, appreciate assistance.    - Continue ceftaroline and vancomycin. Discontinue ceftaroline once vancomycin trough therapeutic at 15-20. Can discharge to rehab on vancomycin once therapeutic as [facility] does not carry ceftaroline.    - Plan on Vanc x 6 weeks from sx (BRIT 1/19/2021) then suppression with Doxycycline   - Hypoxia: CXR 12/13 shows airspace disease, left>right. No pleural effusion.    - Supplemental oxygen via NC as needed.    - Wean oxygen to maintain O2 sats > 90%. Encourage IS use.  - Anemia: improved s/p transfusion 1U RBC 12/10, now stable. Will ctm and transfuse for Hb < 7 or symptomatic at Hb < 8.  - CAD, s/p Cardiac Stent: MI in 2000 which required stent placements. Pt takes ASA and Plavix, last dose  11/30. Resumed ASA/Plavix 12/15.  - YOVANI: CPAP qhs at home settings  - HTN: SBP goal <160. HR 60s, home dose metoprolol dc'd by NCC prior to stepdown, will continue to hold. Continue home dose Imdur and Lisinopril.  - Alzheimer's: Continue home memantine and donepezil  - HLD: Continue home pravastatin  - DVT prophylaxis: SHAYLA's, SCD's,  SQH. Screening BLE U/S 12/11 negative for DVT.  - Bowel regimen: senna BID     Contact Neurosurgery with any questions, concerns, or neuro changes.    Dispo: pending IPR, medically stable for discharge once therapeutic on vancomycin.   negative

## 2020-12-16 NOTE — PT/OT/SLP PROGRESS
Physical Therapy Treatment  Co-Tx with OT due to pt's complexity and benefit of 2 skilled therapists to facilitate safe functional mobility at this time      Patient Name:  Martina Betancourt   MRN:  [id number]  Admitting Diagnosis: Osteomyelitis of thoracic vertebra  Recent Surgery: Procedure(s) (LRB):  T9-T10 corpectomy, posterior instrumented fusion T7-T12. (N/A) 8 Days Post-Op    Recommendations:     Discharge Recommendations:  rehabilitation facility   Discharge Equipment Recommendations: (TBD)   Barriers to discharge: Inaccessible home and Decreased caregiver support    Plan:     During this hospitalization, patient to be seen 4 x/week to address the above listed problems via gait training, therapeutic activities, therapeutic exercises, neuromuscular re-education  · Plan of Care Expires:  01/08/21   Plan of Care Reviewed with: patient    This Plan of care has been discussed with the patient who was involved in its development and understands and is in agreement with the identified goals and treatment plan    Subjective     Communicated with nurse (Francois) prior to session.     Patient comments: Pt with c/o dizziness while seated at the EOB  Pain/Comfort:  · Pain Rating 1: 0/10  · Location - Side 1: Bilateral  · Location - Orientation 1: generalized  · Location 1: back(with movement)  · Pain Addressed 1: Pre-medicate for activity, Reposition, Distraction, Cessation of Activity  · Pain Rating Post-Intervention 1: 9/10    Objective:     Patient found with: bed alarm, PureWick, telemetry, SCD, PICC line(waffle pad)    Patient found sup in bed upon PT entry to room, agreeable to treatment.  No family present in the room.    General Precautions: Standard, fall   Orthopedic Precautions:spinal precautions   Braces: TLSO       BED MOBILITY (vc's for hand placement sequencing of task):        Rolling to the R:  Mod A with rail.       Rolling to the L:  Mod A with rail.        Sup > sit at the EOB:  Max A of 2 for trunk  and LE's, exiting on the R side       Sit > sup:  Max A of 2 for trunk and LE's.       Scooting hips to EOB with mod A       Scooting hips along the EOB to the R requiring max A of 2 x3-4 scoots                              SITTING AT THE EDGE OF THE BED    Assistance Level Required: SBA for safety with B UE support   Postural deviations noted: flexed trunk   Encouraged:  Upright posture              Pt requires assistance to don TLSO while at the EOB        TRANSFERS  (vc's for hand placement, sequencing of task and safety)   Patient completed Sit <> Stand Transfer from EOB with max A of 1 for hip elevation and balance with no assistive device x1 trial(s) Pt only able to achieve partial stand.  2-3 attempts with minimal hip clearance    THERAPEUTIC ACTIVITIES/EXERCISES  Pt requires mod A to maintain R side lying while being assistance by OT for pericare and change of pads 2* being wet with urine and soiled with BM    EDUCATION  Patient provided with daily orientation and goals of this PT session. They were educated to call for assistance and to transfer with hospital staff only.  Also, pt was educated on the effects of prolonged immobility and the importance of performing OOB activity and exercises to promote healing and reduce recovery time      Whiteboard updated with correct mobility information. RN/PCT notified.  Pt requires max A of 2 to sit at the EOB.    Patient left supine, with  all lines intact, call button in reach, bed alarm on and nurse notified    AM-PAC 6 CLICK MOBILITY  Turning over in bed (including adjusting bedclothes, sheets and blankets)?: 2  Sitting down on and standing up from a chair with arms (e.g., wheelchair, bedside commode, etc.): 1  Moving from lying on back to sitting on the side of the bed?: 2  Moving to and from a bed to a chair (including a wheelchair)?: 1  Need to walk in hospital room?: 1  Climbing 3-5 steps with a railing?: 1  Basic Mobility Total Score: 8     Assessment:      Martina Betancourt is a 70 y.o. female admitted with a medical diagnosis of Osteomyelitis of thoracic vertebra.  She presents with the following impairments/functional limitations:  weakness, impaired endurance, impaired sensation, impaired self care skills, impaired functional mobilty, gait instability, impaired balance, decreased upper extremity function, decreased lower extremity function, pain, impaired coordination, impaired skin, edema, orthopedic precautions. requiring significant assistance and verbal cues for bed mob, scooting to/along the EOB, sit < > stand to prevent falls due to weakness, pain, fatigue and body habitus.   In light of pt's current functional level and deficits, it is anticipated that pt will need to participate in an intense rehab program consisting of PT and OT in order to achieve full rehab potential to return to previous level of function and roles.  Pt will cont to benefit from skilled PT intervention to address deficits and improve functional mobility.     Rehab Prognosis:  Fair; patient would benefit from acute skilled PT services to address these deficits and reach maximum level of function.      GOALS:   Multidisciplinary Problems     Physical Therapy Goals        Problem: Physical Therapy Goal    Goal Priority Disciplines Outcome Goal Variances Interventions   Physical Therapy Goal     PT, PT/OT Ongoing, Progressing     Description: Goals to be met by: 2020     Patient will increase functional independence with mobility by performin. Supine to sit with MInimal Assistance  2. Sit to supine with MInimal Assistance  3. Rolling to Left and Right with Minimal Assistance.  4. Bed to chair transfer with Maximum Assistance using Slideboard  5. Sitting at edge of bed x8 minutes with Stand-by Assistance  6. Lower extremity exercise program x30 reps per handout, with independence                     Time Tracking:     PT Received On: 20  PT Start Time: 1350     PT Stop  Time: 1424  PT Total Time (min): 34 min     Billable Minutes: Therapeutic Activity 34    Treatment Type: Treatment  PT/PTA: PTA     PTA Visit Number: 4       Viridiana Bermudez PTA.  Pager [phone]    12/16/2020    .

## 2020-12-17 LAB
ALBUMIN SERPL BCP-MCNC: 1.8 G/DL (ref 3.5–5.2)
ALP SERPL-CCNC: 102 U/L (ref 55–135)
ALT SERPL W/O P-5'-P-CCNC: 5 U/L (ref 10–44)
ANION GAP SERPL CALC-SCNC: 6 MMOL/L (ref 8–16)
AST SERPL-CCNC: 11 U/L (ref 10–40)
BASOPHILS # BLD AUTO: 0.05 K/UL (ref 0–0.2)
BASOPHILS NFR BLD: 0.7 % (ref 0–1.9)
BILIRUB SERPL-MCNC: 0.2 MG/DL (ref 0.1–1)
BUN SERPL-MCNC: 5 MG/DL (ref 8–23)
CALCIUM SERPL-MCNC: 9.4 MG/DL (ref 8.7–10.5)
CHLORIDE SERPL-SCNC: 104 MMOL/L (ref 95–110)
CO2 SERPL-SCNC: 29 MMOL/L (ref 23–29)
CREAT SERPL-MCNC: 0.8 MG/DL (ref 0.5–1.4)
DIFFERENTIAL METHOD: ABNORMAL
EOSINOPHIL # BLD AUTO: 0.3 K/UL (ref 0–0.5)
EOSINOPHIL NFR BLD: 4.2 % (ref 0–8)
ERYTHROCYTE [DISTWIDTH] IN BLOOD BY AUTOMATED COUNT: 17.2 % (ref 11.5–14.5)
EST. GFR  (AFRICAN AMERICAN): >60 ML/MIN/1.73 M^2
EST. GFR  (NON AFRICAN AMERICAN): >60 ML/MIN/1.73 M^2
GLUCOSE SERPL-MCNC: 75 MG/DL (ref 70–110)
HCT VFR BLD AUTO: 29 % (ref 37–48.5)
HGB BLD-MCNC: 8.5 G/DL (ref 12–16)
IMM GRANULOCYTES # BLD AUTO: 0.03 K/UL (ref 0–0.04)
IMM GRANULOCYTES NFR BLD AUTO: 0.4 % (ref 0–0.5)
LYMPHOCYTES # BLD AUTO: 1.8 K/UL (ref 1–4.8)
LYMPHOCYTES NFR BLD: 25.3 % (ref 18–48)
MAGNESIUM SERPL-MCNC: 2 MG/DL (ref 1.6–2.6)
MCH RBC QN AUTO: 27.6 PG (ref 27–31)
MCHC RBC AUTO-ENTMCNC: 29.3 G/DL (ref 32–36)
MCV RBC AUTO: 94 FL (ref 82–98)
MONOCYTES # BLD AUTO: 1.1 K/UL (ref 0.3–1)
MONOCYTES NFR BLD: 15.5 % (ref 4–15)
NEUTROPHILS # BLD AUTO: 3.9 K/UL (ref 1.8–7.7)
NEUTROPHILS NFR BLD: 53.9 % (ref 38–73)
NRBC BLD-RTO: 0 /100 WBC
PHOSPHATE SERPL-MCNC: 3.3 MG/DL (ref 2.7–4.5)
PLATELET # BLD AUTO: 353 K/UL (ref 150–350)
PMV BLD AUTO: 9.5 FL (ref 9.2–12.9)
POTASSIUM SERPL-SCNC: 4.1 MMOL/L (ref 3.5–5.1)
PROT SERPL-MCNC: 5.4 G/DL (ref 6–8.4)
RBC # BLD AUTO: 3.08 M/UL (ref 4–5.4)
SARS-COV-2 RDRP RESP QL NAA+PROBE: NEGATIVE
SODIUM SERPL-SCNC: 139 MMOL/L (ref 136–145)
VANCOMYCIN TROUGH SERPL-MCNC: 13.2 UG/ML (ref 10–22)
WBC # BLD AUTO: 7.15 K/UL (ref 3.9–12.7)

## 2020-12-17 PROCEDURE — 99024 POSTOP FOLLOW-UP VISIT: CPT | Mod: POP,,, | Performed by: PHYSICIAN ASSISTANT

## 2020-12-17 PROCEDURE — 84100 ASSAY OF PHOSPHORUS: CPT

## 2020-12-17 PROCEDURE — 83735 ASSAY OF MAGNESIUM: CPT

## 2020-12-17 PROCEDURE — 25000003 PHARM REV CODE 250: Performed by: STUDENT IN AN ORGANIZED HEALTH CARE EDUCATION/TRAINING PROGRAM

## 2020-12-17 PROCEDURE — 99024 PR POST-OP FOLLOW-UP VISIT: ICD-10-PCS | Mod: POP,,, | Performed by: PHYSICIAN ASSISTANT

## 2020-12-17 PROCEDURE — 63600175 PHARM REV CODE 636 W HCPCS: Mod: JG | Performed by: PHYSICIAN ASSISTANT

## 2020-12-17 PROCEDURE — 85025 COMPLETE CBC W/AUTO DIFF WBC: CPT

## 2020-12-17 PROCEDURE — 25000003 PHARM REV CODE 250: Performed by: PHYSICIAN ASSISTANT

## 2020-12-17 PROCEDURE — 80202 ASSAY OF VANCOMYCIN: CPT

## 2020-12-17 PROCEDURE — U0002 COVID-19 LAB TEST NON-CDC: HCPCS

## 2020-12-17 PROCEDURE — 80053 COMPREHEN METABOLIC PANEL: CPT

## 2020-12-17 PROCEDURE — 36415 COLL VENOUS BLD VENIPUNCTURE: CPT

## 2020-12-17 PROCEDURE — 11000001 HC ACUTE MED/SURG PRIVATE ROOM

## 2020-12-17 PROCEDURE — 63600175 PHARM REV CODE 636 W HCPCS: Performed by: PHYSICIAN ASSISTANT

## 2020-12-17 PROCEDURE — 63600175 PHARM REV CODE 636 W HCPCS: Performed by: NEUROLOGICAL SURGERY

## 2020-12-17 RX ORDER — AMOXICILLIN 250 MG
2 CAPSULE ORAL NIGHTLY PRN
Start: 2020-12-17

## 2020-12-17 RX ORDER — VANCOMYCIN HCL IN 5 % DEXTROSE 1G/250ML
1500 PLASTIC BAG, INJECTION (ML) INTRAVENOUS DAILY
Qty: 375 ML | Refills: 0
Start: 2020-12-17 | End: 2020-12-18 | Stop reason: HOSPADM

## 2020-12-17 RX ORDER — METHOCARBAMOL 750 MG/1
750 TABLET, FILM COATED ORAL 4 TIMES DAILY
Qty: 40 TABLET | Refills: 0 | Status: ON HOLD
Start: 2020-12-17 | End: 2021-01-08 | Stop reason: HOSPADM

## 2020-12-17 RX ORDER — OXYCODONE HYDROCHLORIDE 10 MG/1
10 TABLET ORAL EVERY 6 HOURS PRN
Refills: 0 | Status: ON HOLD
Start: 2020-12-17 | End: 2022-05-06 | Stop reason: HOSPADM

## 2020-12-17 RX ORDER — BACITRACIN ZINC 500 [USP'U]/G
OINTMENT TOPICAL 2 TIMES DAILY
Status: ON HOLD | COMMUNITY
Start: 2020-12-17 | End: 2022-04-12 | Stop reason: HOSPADM

## 2020-12-17 RX ADMIN — SENNOSIDES AND DOCUSATE SODIUM 1 TABLET: 8.6; 5 TABLET ORAL at 09:12

## 2020-12-17 RX ADMIN — BACITRACIN: 500 OINTMENT TOPICAL at 09:12

## 2020-12-17 RX ADMIN — CEFTAROLINE FOSAMIL 600 MG: 600 POWDER, FOR SOLUTION INTRAVENOUS at 05:12

## 2020-12-17 RX ADMIN — METHOCARBAMOL 750 MG: 750 TABLET ORAL at 08:12

## 2020-12-17 RX ADMIN — METHOCARBAMOL 750 MG: 750 TABLET ORAL at 09:12

## 2020-12-17 RX ADMIN — ACETAMINOPHEN 650 MG: 325 TABLET ORAL at 05:12

## 2020-12-17 RX ADMIN — MEMANTINE HYDROCHLORIDE 10 MG: 10 TABLET ORAL at 08:12

## 2020-12-17 RX ADMIN — METHOCARBAMOL 750 MG: 750 TABLET ORAL at 11:12

## 2020-12-17 RX ADMIN — PANTOPRAZOLE SODIUM 40 MG: 40 TABLET, DELAYED RELEASE ORAL at 05:12

## 2020-12-17 RX ADMIN — FERROUS SULFATE TAB EC 325 MG (65 MG FE EQUIVALENT) 325 MG: 325 (65 FE) TABLET DELAYED RESPONSE at 08:12

## 2020-12-17 RX ADMIN — HEPARIN SODIUM 5000 UNITS: 5000 INJECTION INTRAVENOUS; SUBCUTANEOUS at 08:12

## 2020-12-17 RX ADMIN — ACETAMINOPHEN 650 MG: 325 TABLET ORAL at 06:12

## 2020-12-17 RX ADMIN — ASPIRIN 81 MG: 81 TABLET, COATED ORAL at 09:12

## 2020-12-17 RX ADMIN — VANCOMYCIN HYDROCHLORIDE 1750 MG: 750 INJECTION, POWDER, LYOPHILIZED, FOR SOLUTION INTRAVENOUS at 10:12

## 2020-12-17 RX ADMIN — METHOCARBAMOL 750 MG: 750 TABLET ORAL at 05:12

## 2020-12-17 RX ADMIN — HEPARIN SODIUM 5000 UNITS: 5000 INJECTION INTRAVENOUS; SUBCUTANEOUS at 05:12

## 2020-12-17 RX ADMIN — CLOPIDOGREL 75 MG: 75 TABLET, FILM COATED ORAL at 09:12

## 2020-12-17 RX ADMIN — ACETAMINOPHEN 650 MG: 325 TABLET ORAL at 11:12

## 2020-12-17 RX ADMIN — MEMANTINE HYDROCHLORIDE 10 MG: 10 TABLET ORAL at 09:12

## 2020-12-17 RX ADMIN — ISOSORBIDE MONONITRATE 60 MG: 30 TABLET, EXTENDED RELEASE ORAL at 09:12

## 2020-12-17 RX ADMIN — OXYCODONE HYDROCHLORIDE 10 MG: 10 TABLET ORAL at 09:12

## 2020-12-17 RX ADMIN — OXYCODONE HYDROCHLORIDE 10 MG: 10 TABLET ORAL at 11:12

## 2020-12-17 RX ADMIN — DONEPEZIL HYDROCHLORIDE 10 MG: 10 TABLET ORAL at 08:12

## 2020-12-17 RX ADMIN — LISINOPRIL 2.5 MG: 2.5 TABLET ORAL at 09:12

## 2020-12-17 RX ADMIN — CEFTAROLINE FOSAMIL 600 MG: 600 POWDER, FOR SOLUTION INTRAVENOUS at 08:12

## 2020-12-17 RX ADMIN — ACETAMINOPHEN 650 MG: 325 TABLET ORAL at 12:12

## 2020-12-17 RX ADMIN — HEPARIN SODIUM 5000 UNITS: 5000 INJECTION INTRAVENOUS; SUBCUTANEOUS at 02:12

## 2020-12-17 RX ADMIN — SENNOSIDES AND DOCUSATE SODIUM 1 TABLET: 8.6; 5 TABLET ORAL at 08:12

## 2020-12-17 RX ADMIN — BACITRACIN 1 EACH: 500 OINTMENT TOPICAL at 08:12

## 2020-12-17 RX ADMIN — CEFTAROLINE FOSAMIL 600 MG: 600 POWDER, FOR SOLUTION INTRAVENOUS at 11:12

## 2020-12-17 RX ADMIN — PRAVASTATIN SODIUM 40 MG: 40 TABLET ORAL at 08:12

## 2020-12-17 RX ADMIN — DULOXETINE HYDROCHLORIDE 60 MG: 60 CAPSULE, DELAYED RELEASE ORAL at 09:12

## 2020-12-17 NOTE — PLAN OF CARE
[facility]    FACILITY TRANSFER ORDERS      Patient Name: Martina Betancourt  YOB: 1950    PCP: Joe Fuller Iii, MD   PCP Address: [address] [place] [address]  PCP Phone Number: [phone]  PCP Fax: [phone]    Encounter Date: 12/18/2020    Admit to: Inpatient Rehab    Vital Signs:  Routine    Diagnoses:   Active Hospital Problems    Diagnosis  POA    *Osteomyelitis of thoracic vertebra [M46.24]  Yes    Hyponatremia [E87.1]  Unknown    Pre-operative cardiovascular examination [Z01.810]  Not Applicable    Thoracic spine fracture [S22.009A]  Yes    Debility [R53.81]  Yes    HTN (hypertension) [I10]  Yes    Discitis of thoracic region [M46.44]  Yes    Obstructive sleep apnea treated with continuous positive airway pressure (CPAP) [G47.33, Z99.89]  Not Applicable    Severe obesity (BMI >= 40) [E66.01]  Yes    MRSA bacteremia [R78.81, B95.62]  Yes    Dementia without behavioral disturbance [F03.90]  Yes      Resolved Hospital Problems   No resolved problems to display.       Allergies:  Review of patient's allergies indicates:   Allergen Reactions    Hydroxyzine hcl     Tizanidine        Diet: cardiac diet    Activities: Activity as tolerated    Nursing:  Fall precautions, delirium precautions    TLSO brace when upright, OOB, or working with therapy. Okay to remove when flat/reclined and for personal hygiene.    Labs: CBC and BMP per facility provider protocol    CONSULTS:    Physical Therapy to evaluate and treat.  and Occupational Therapy to evaluate and treat.    MISCELLANEOUS CARE:  Routine Skin for Bedridden Patients: Apply moisture barrier cream to all skin folds and wet areas in perineal area daily and after baths and all bowel movements.    WOUND CARE ORDERS  Yes: Surgical Wound:  Location: Thoracic spine    Bacitracin to incision BID until staples removed.   Nurse to assess incision for appropriate wound healing. Nurse to remove staples on  12/22/2020 if healed appropriately. For questions and concerns regarding surgical wound, please call [facility] Neurosurgery office: [phone].    Wound Care:    No bandage required. Keep your incision open to the air.    You may shower on the 2nd day after your surgery. Keep the incision clean and dry at all times. Please cover the incision while showering and REMOVE once you have completed taking your shower. Do not allow the force of water to hit the incision. If the incision gets damp, pat it dry. Do not rub or scrub the incision.    You cannot take a bath until 8 weeks after surgery.    The incision does not need to be cleaned with any water, soap, alcohol, peroxide, or other substance.    Apply bacitracin to incision twice a day for 2 weeks after surgery until staples removed.      Medications: Review discharge medications with patient and family and provide education.      Antibiotic Instructions:      Vancomycin - 1.75 g every 24 hours (next dose due: 12/18 @ 2200)     Estimated end date of IV antibiotics: 1/19/2021     Weekly outpatient laboratory on Monday or Tuesday while on IV antibiotics.   · CBC  · CMP  · ESR and CRP  · Vancomycin trough. Target 15-20        Fax laboratory results to [facility] at [phone] with attn: Neftali Rm PA-C    Current Discharge Medication List      START taking these medications    Details   bacitracin zinc 500 unit/gram OiPk Apply topically 2 (two) times daily.  Qty:        oxyCODONE (ROXICODONE) 10 mg Tab immediate release tablet Take 1 tablet (10 mg total) by mouth every 6 (six) hours as needed for Pain.  Qty:  , Refills: 0    Comments: Quantity prescribed more than 7 day supply? No      senna-docusate 8.6-50 mg (PERICOLACE) 8.6-50 mg per tablet Take 2 tablets by mouth nightly as needed for Constipation.  Qty:           CONTINUE these medications which have CHANGED    Details   methocarbamoL (ROBAXIN) 750 MG Tab Take 1 tablet (750 mg total) by mouth 4 (four) times  daily. for 10 days  Qty: 40 tablet, Refills: 0      vancomycin HCl in 5 % dextrose (VANCOMYCIN IN DEXTROSE 5 %) 1 gram/250 mL Soln Inject 375 mLs (1,500 mg total) into the vein once daily.  Qty: 375 mL, Refills: 0         CONTINUE these medications which have NOT CHANGED    Details   acetaminophen (TYLENOL) 325 MG tablet Take 2 tablets (650 mg total) by mouth every 6 (six) hours as needed (HEADACHE, TEMPERATURE - FEVER > 100.4).  Qty:  , Refills: 0      albuterol-ipratropium (DUO-NEB) 2.5 mg-0.5 mg/3 mL nebulizer solution Take 3 mLs by nebulization every 6 (six) hours as needed for Wheezing or Shortness of Breath. Rescue  Qty: 1 Box, Refills: 0      aluminum & magnesium hydroxide-simethicone (MYLANTA MAX STRENGTH) 400-400-40 mg/5 mL suspension Take 30 mLs by mouth every 6 (six) hours as needed for Indigestion.  Qty:  , Refills: 0      aspirin (ECOTRIN) 81 MG EC tablet Take 81 mg by mouth once daily.      clopidogreL (PLAVIX) 75 mg tablet Take 1 tablet (75 mg total) by mouth once daily.  Qty: 30 tablet, Refills: 11      donepeziL (ARICEPT) 10 MG tablet Take 10 mg by mouth every evening.      DULoxetine (CYMBALTA) 60 MG capsule Take 60 mg by mouth once daily.      ferrous sulfate 324 mg (65 mg iron) TbEC Take 325 mg by mouth every evening.      furosemide (LASIX) 40 MG tablet Take 1 tablet (40 mg total) by mouth once daily.  Qty: 30 tablet, Refills: 11      isosorbide mononitrate (IMDUR) 60 MG 24 hr tablet Take 60 mg by mouth once daily.      memantine (NAMENDA) 10 MG Tab Take 1 tablet (10 mg total) by mouth 2 (two) times daily.  Qty: 60 tablet, Refills: 0      ondansetron (ZOFRAN-ODT) 4 MG TbDL Take 1 tablet (4 mg total) by mouth every 8 (eight) hours as needed.  Qty: 12 tablet, Refills: 0      pantoprazole (PROTONIX) 40 MG tablet Take 1 tablet (40 mg total) by mouth before breakfast.  Qty: 30 tablet, Refills: 11      pravastatin (PRAVACHOL) 40 MG tablet Take 40 mg by mouth every evening.         STOP taking these  medications       bisacodyL (DULCOLAX) 5 mg EC tablet Comments:   Reason for Stopping:         calcium carbonate (TUMS) 200 mg calcium (500 mg) chewable tablet Comments:   Reason for Stopping:         enoxaparin (LOVENOX) 40 mg/0.4 mL Syrg Comments:   Reason for Stopping:         guaiFENesin (MUCINEX) 600 mg 12 hr tablet Comments:   Reason for Stopping:         HYDROcodone-acetaminophen (NORCO)  mg per tablet Comments:   Reason for Stopping:         lisinopriL (PRINIVIL,ZESTRIL) 2.5 MG tablet Comments:   Reason for Stopping:         lisinopriL 10 MG tablet Comments:   Reason for Stopping:         melatonin (MELATIN) 3 mg tablet Comments:   Reason for Stopping:         memantine (NAMENDA XR) 28 mg CSpX Comments:   Reason for Stopping:         metoprolol succinate (TOPROL-XL) 50 MG 24 hr tablet Comments:   Reason for Stopping:         miconazole nitrate 2% (MICOTIN) 2 % Oint Comments:   Reason for Stopping:         sodium chloride 0.9% (NORMAL SALINE FLUSH) injection Comments:   Reason for Stopping:                    _________________________________  Melody Santiago PA-C  12/18/2020

## 2020-12-17 NOTE — DISCHARGE INSTRUCTIONS
Neurosurgery Patient Information. Please follow ONLY the instructions that are checked below.    Activity Restrictions:  [x]  Return to work will be determined on an individual basis.  [x]  No lifting greater than 5-10 pounds.  [x]  Avoid bending and twisting the area of your surgery more than 45 degrees from neutral position in any direction.  [x]  No driving or operating machinery:  [x]  until cleared by your surgeon.  [x]  while taking narcotic pain medications or muscle relaxants.  [x]  Wear your brace at all times except when lying in bed, showering, using the restroom, or performing hygiene tasks.   [x]  Slowly increase your activity over the next 2 weeks as tolerated.   [x]  Walk on paved surfaces only. It is okay to walk up and down stairs while holding onto a side rail.  [x]  No sexual activity for 6 weeks.    Discharge Medication/Follow-up:  [x]  Please refer to discharge medication reconciliation form.  [x]  Do not take ANY herbal supplements for 2 weeks after surgery.    [x]  Do not take ANY non-steroidal anti-inflammatory drugs (NSAIDS), including the following: ibuprofen, naprosyn, Aleve, Advil, Indocin, Mobic, or Celebrex for 6 weeks after surgery.   [x]  Prescriptions for appropriate medication will be given upon discharge.  [x]  Take docusate (Colace 100 mg): take one capsule a day as needed for constipation. You can get this over the counter.  [x]  Follow-up appointment:  [x]  4-6 weeks with MD:  [x]  with x-rays  [x]  An appointment will be mailed to you.    Wound Care:  [x]  No bandage required. Keep your incision open to the air.  [x]  You may shower on the 2nd day after your surgery. Keep the incision clean and dry at all times. Please cover the incision while showering and REMOVE once you have completed taking your shower. Do not allow the force of water to hit the incision. If the incision gets damp, pat it dry. Do not rub or scrub the incision.  [x]  You cannot take a bath until 8 weeks  after surgery.  [x]  The incision does not need to be cleaned with any water, soap, alcohol, peroxide, or other substance.  [x]  Apply bacitracin to incision twice a day for 2 weeks after surgery until staples removed.    Call your doctor or go to the Emergency Room for any signs of infection, including: increased redness, drainage, pain, or fever (temperature ?101.5 for 24 hours). Call your doctor or go to the Emergency Room if there are any localized neurological changes; problems with speech, vision, numbness, tingling, weakness, or severe headache; or for other concerns.    Special Instructions:  [x]  No use of tobacco products.  [x]  Diet: Please eat a regular diet as tolerated.      Physicians need 3 days' notice for pain medicine to be refilled. Pain medicine will only be refilled between 8 AM and 5 PM, Monday through Friday, due to Food and Drug Administration regulation of documentation.        [facility] Neurosurgery Office: [phone]              [facility] Neurosurgery Office: [phone]   [facility] Office: [phone]

## 2020-12-17 NOTE — PROGRESS NOTES
[facility]  Neurosurgery  Progress Note    Subjective:     History of Present Illness: 70 y.o. [place] [facility] [place] PMH of Alzheimer's dz, HTN, HLD, CAD, YOVANI, and morbid obesity who was recently hospitalized x2 for MRSA bacteremia complicated by pneumonia and possible UTI, treated with linozolid x8 days and cipro and discharged 10/1. She was then readmitted to [facility] on 10/8 with severe back pain. MRI revealed T9-10 osteodiscitis and T8-10 epidural phlegmon with associated paraverterbral abscesses. Spine infection likely hematogenous from under-treated bacteremia. Patient had no neurologic deficits on exam, no indications for emergent neurosurgical intervention, plan was made for conservative non-surgical treatment. She underwent needle aspiration of T9-10 disc space on 10/16, cultures were negative although she had already been undergoing antibiotic treatment. Repeat BCx remained no growth and she remained afebrile and neurologically stable. Patient was discharged to [facility] on 10/20 with plan for Vancomycin IV x 8 weeks (estimated end date 12/3).    Patient presents today after interval imaging obtained 11/30 revealed worsening bony destruction, unstable T9 fracture. [place] [facility] [place]     Post-Op Info:  Procedure(s) (LRB):  T9-T10 corpectomy, posterior instrumented fusion T7-T12. (N/A)   9 Days Post-Op     Interval History: NAEON. AFVSS. Pt resting this morning, c/o continued incisional back pain and R hip pain. Neuro exam stable. RLE more limited today 2/2 hip pain but grossly full strength. Will check XR of pelvis. Denies any new weakness, paresthesias, and b/b dysfunction. Repeat Vanc trough tonight, likely plan for DC to rehab tomorrow.    Medications:  Continuous Infusions:  Scheduled Meds:   acetaminophen  650 mg Oral Q6H    aspirin  81 mg Oral Daily    bacitracin zinc   Topical (Top) BID    bisacodyL  10 mg Rectal Daily    ceftaroline fosamil  600 mg  Intravenous Q8H    clopidogreL  75 mg Oral Daily    donepeziL  10 mg Oral QHS    DULoxetine  60 mg Oral Daily    ferrous sulfate  325 mg Oral QHS    heparin (porcine)  5,000 Units Subcutaneous Q8H    isosorbide mononitrate  60 mg Oral Daily    lisinopriL  2.5 mg Oral Daily    memantine  10 mg Oral BID    methocarbamoL  750 mg Oral QID    pantoprazole  40 mg Oral Before breakfast    pravastatin  40 mg Oral QHS    senna-docusate 8.6-50 mg  1 tablet Oral BID    vancomycin (VANCOCIN) IVPB  1,500 mg Intravenous Q24H     PRN Meds:sodium chloride, albuterol-ipratropium, aluminum-magnesium hydroxide-simethicone, dextrose 50%, dextrose 50%, glucagon (human recombinant), glucose, glucose, glucose, insulin aspart U-100, labetaloL, morphine, ondansetron, oxyCODONE, oxyCODONE, promethazine (PHENERGAN) IVPB, senna-docusate 8.6-50 mg, Pharmacy to dose Vancomycin consult **AND** vancomycin - pharmacy to dose     Review of Systems  Objective:     Weight: (!) 141.3 kg (311 lb 8.2 oz)  Body mass index is 44.7 kg/m².  Vital Signs (Most Recent):  Temp: 98.4 °F (36.9 °C) (12/17/20 0742)  Pulse: (!) 58 (12/17/20 0819)  Resp: 18 (12/17/20 0908)  BP: 133/86 (12/17/20 0742)  SpO2: 97 % (12/17/20 0742) Vital Signs (24h Range):  Temp:  [96.8 °F (36 °C)-98.7 °F (37.1 °C)] 98.4 °F (36.9 °C)  Pulse:  [58-89] 58  Resp:  [17-21] 18  SpO2:  [95 %-97 %] 97 %  BP: (122-136)/(58-86) 133/86                          Closed/Suction Drain 12/08/20 1239 Right Back Accordion 10 Fr. (Active)   Site Description Unable to view 12/13/20 2000   Dressing Type Transparent (Tegaderm) 12/13/20 2000   Dressing Status Clean;Dry;Intact 12/13/20 2000   Dressing Intervention Integrity maintained 12/13/20 2000   Drainage Serosanguineous 12/13/20 2000   Status To bulb suction 12/13/20 2000   Output (mL) 0 mL 12/13/20 1800            Closed/Suction Drain 12/08/20 1239 Left Back Accordion 10 Fr. (Active)   Site Description Unable to view 12/13/20 2000   Dressing  Type Transparent (Tegaderm) 12/13/20 2000   Dressing Status Clean;Dry;Intact 12/13/20 2000   Dressing Intervention Integrity maintained 12/13/20 2000   Drainage Serosanguineous 12/13/20 2000   Status To bulb suction 12/13/20 2000   Output (mL) 0 mL 12/13/20 1800       Female External Urinary Catheter 12/09/20 1741 (Active)   Skin no breakdown;perineum cleansed w/ soap and water;female external urine collection device repositioned 12/16/20 2000   Tolerance no signs/symptoms of discomfort 12/16/20 2000   Suction Continuous suction at 70 mmHg 12/16/20 2000   Date of last wick change 12/16/20 12/16/20 2000   Time of last wick change 0800 12/16/20 0800   Output (mL) 300 mL 12/15/20 0800       Neurosurgery Physical Exam    General: well developed, well nourished, no distress.   Head: normocephalic, atraumatic  Neck: No tracheal deviation.  Neurologic: Alert and oriented. Thought content appropriate.  GCS: Motor: 6/Verbal: 5/Eyes: 4 GCS Total: 15  Mental Status: Awake, Alert, Oriented x 4  Language: No aphasia  Speech: No dysarthria  Cranial nerves: face symmetric, tongue midline, CN II-XII grossly intact.   Eyes: pupils equal, round, reactive to light with accomodation, EOMI.   Ears: No drainage.   Pulmonary: normal respirations, no signs of respiratory distress, nasal cannula in place  Abdomen: soft, non-distended, not tender to palpation  Sensory: intact to light touch throughout  Motor Strength: Moves all extremities spontaneously with good tone.  Full strength upper and lower extremities. BLE ROM limited 2/2 pain and body habitus, R>L. No abnormal movements seen.      Strength   Deltoids Triceps Biceps Wrist Extension Wrist Flexion Hand    Upper: R 5/5 5/5 5/5 5/5 5/5 5/5     L 5/5 5/5 5/5 5/5 5/5 5/5       Iliopsoas Quadriceps Knee  Flexion Tibialis  anterior Gastro- cnemius EHL   Lower: R 5/5 5/5 5/5 5/5 5/5 5/5     L 5/5 5/5 5/5 5/5 5/5 5/5      Vascular: No LE edema.   Skin: Skin is warm, dry and  intact.     Thoracic Incision c/d/I with skin edges well approximated with staples. No surrounding erythema or edema. No drainage from incision.       Significant Labs:  Recent Labs   Lab 12/16/20  0337 12/17/20  0500   GLU 90 75    139   K 3.4* 4.1    104   CO2 29 29   BUN 6* 5*   CREATININE 0.8 0.8   CALCIUM 8.9 9.4   MG  --  2.0     Recent Labs   Lab 12/17/20  0500   WBC 7.15   HGB 8.5*   HCT 29.0*   *     No results for input(s): LABPT, INR, APTT in the last 48 hours.  Microbiology Results (last 7 days)     Procedure Component Value Units Date/Time    Culture, Anaerobe [[id number]] Collected: 12/08/20 1139    Order Status: Completed Specimen: Back Updated: 12/15/20 0740     Anaerobic Culture No anaerobes isolated    Narrative:      T9-10 disc space    Culture, Anaerobe [[id number]] Collected: 12/08/20 1139    Order Status: Completed Specimen: Back Updated: 12/15/20 0740     Anaerobic Culture No anaerobes isolated    Narrative:      T9-10 disc space    Aerobic culture [[id number]] Collected: 12/08/20 1139    Order Status: Completed Specimen: Back Updated: 12/12/20 1007     Aerobic Bacterial Culture No growth    Narrative:      T9-10 disc space    Aerobic culture [[id number]] Collected: 12/08/20 1139    Order Status: Completed Specimen: Back Updated: 12/11/20 1047     Aerobic Bacterial Culture No growth    Narrative:      T9-10 disc space    Fungus culture [[id number]] Collected: 12/08/20 1139    Order Status: Completed Specimen: Back Updated: 12/10/20 1118     Fungus (Mycology) Culture Culture in progress    Narrative:      T9-10 disc space    Fungus culture [[id number]] Collected: 12/08/20 1139    Order Status: Completed Specimen: Back Updated: 12/10/20 1118     Fungus (Mycology) Culture Culture in progress    Narrative:      T9-10 disc space        All pertinent labs from the last 24 hours have been reviewed.    Significant Diagnostics:  I have reviewed and interpreted all pertinent imaging  results/findings within the past 24 hours.    Assessment/Plan:     * Osteomyelitis of thoracic vertebra  70 F with known MRSA bacteremia with thoracic osteomyelitis presenting as transfer from SNF after CT T spine concerning for increased erosion of T9/T10 vertebral bodies as well as bilateral T9 pedicle fractures now s/p T9-10 corpectomy with T7-12 PSIF on 12/8:    Neurologically stable on exam.   - q4h neuro checks  - Post op imaging obtained when flat as patient is unable to sit up unassisted, satisfactory hardware placement.  - TLSO brace when up/OOB  - Keep incision open to air, bacitracin BID. Staples to be removed 12/22.  - Pain control: scheduled Tylenol and Robaxin, Oxycodone PRN  - Recommend repeat TTE as outpatient given changes from 8/10 - 10/12. Pt may have had NSTEMI. Endocarditis felt less likely.   - Will place ambulatory referral to Cardiology at discharge  - H/o MRSA Bacteremia: Afebrile, no leukocytosis. OR cultures remain NGTD. ID consulted, appreciate assistance.    - Continue ceftaroline and vancomycin. Discontinue ceftaroline once vancomycin trough therapeutic at 15-20. Can discharge to rehab on vancomycin as [facility] does not carry ceftaroline.    - Plan on Vanc x 6 weeks from sx (BRIT 1/19/2021) then suppression with Doxycycline    - Follow up repeat Vanc trough tonight, Pharmacy consulted for dosing assistance  - Hypoxia: CXR 12/13 shows airspace disease, left>right. No pleural effusion.    - Supplemental oxygen via NC as needed.    - Wean oxygen to maintain O2 sats > 90%. Encourage IS use.  - Anemia: improved s/p transfusion 1U RBC 12/10, now stable. Will ctm and transfuse for Hb < 7 or symptomatic at Hb < 8.  - CAD, s/p Cardiac Stent: MI in 2000 which required stent placements. Resumed home ASA/Plavix 12/15.  - YOVANI: CPAP qhs at home settings  - HTN: SBP goal <160. HR 60s, home dose metoprolol dc'd by N[facility] prior to stepdown, will continue to hold. Continue home dose Imdur and  Lisinopril.  - Alzheimer's: Continue home memantine and donepezil  - HLD: Continue home pravastatin  - DVT prophylaxis: SHALYA's, SCD's, SQH. Screening BLE U/S 12/11 negative for DVT.  - Bowel regimen: senna BID  - Atelectasis prophylaxis: IS hourly, PT/OT     Contact Neurosurgery with any questions, concerns, or neuro changes.    Dispo: pending IPR, medically stable for discharge, likely tomorrow pending repeat vancomycin trough this evening.    MRSA bacteremia            CHIDI EllisC  Neurosurgery  [facility]

## 2020-12-17 NOTE — PLAN OF CARE
Problem: Adult Inpatient Plan of Care  Goal: Plan of Care Review  Outcome: Ongoing, Progressing  Flowsheets (Taken 12/16/2020 1828)  Plan of Care Reviewed With: patient  Goal: Patient-Specific Goal (Individualization)  Outcome: Ongoing, Progressing  Flowsheets (Taken 12/16/2020 1828)  Individualized Care Needs: pain management  Anxieties, Fears or Concerns: Anxiety over pain management  Patient-Specific Goals (Include Timeframe): Discharge to rehab soon     Problem: Skin Injury Risk Increased  Goal: Skin Health and Integrity  Outcome: Ongoing, Progressing  Intervention: Optimize Skin Protection  Flowsheets (Taken 12/16/2020 1828)  Pressure Reduction Techniques:   frequent weight shift encouraged   positioned off wounds   pressure points protected   weight shift assistance provided  Pressure Reduction Devices:   foam padding utilized   positioning supports utilized   pressure-redistributing mattress utilized  Skin Protection:   adhesive use limited   electrode sites changed   incontinence pads utilized   pouching devices used   pulse oximeter probe site changed   silicone foam dressing in place   skin sealant/moisture barrier applied   skin-to-device areas padded   skin-to-skin areas padded   transparent dressing maintained   tubing/devices free from skin contact  Head of Bed (HOB): HOB at 30-45 degrees     POC reviewed with Pt and Pt verbalized understanding of POC. All comments and concerns addressed. Pt progressing towards goals. Bed locked in lowest position with bed alarm set. PICC dressing changed today, clean dry intact -- both lumens flushed w/o difficulty, purple lumen blood return noted; red lumen, no blood return noted. Telemetry and visimobile in place. PRN oxycodone given this shift for back pain, 8/10. VS and assessment in flowsheets. No acute events to report this shift. Will continue to monitor for changes to POC and clinical condition.

## 2020-12-17 NOTE — ASSESSMENT & PLAN NOTE
70 F with known MRSA bacteremia with thoracic osteomyelitis presenting as transfer from SNF after CT T spine concerning for increased erosion of T9/T10 vertebral bodies as well as bilateral T9 pedicle fractures now s/p T9-10 corpectomy with T7-12 PSIF on 12/8:    Neurologically stable on exam.   - q4h neuro checks  - Post op imaging obtained when flat as patient is unable to sit up unassisted, satisfactory hardware placement.  - TLSO brace when up/OOB  - Keep incision open to air, bacitracin BID. Staples to be removed 12/22.  - Pain control: scheduled Tylenol and Robaxin, Oxycodone PRN  - Recommend repeat TTE as outpatient given changes from 8/10 - 10/12. Pt may have had NSTEMI. Endocarditis felt less likely.   - Will place ambulatory referral to Cardiology at discharge  - H/o MRSA Bacteremia: Afebrile, no leukocytosis. OR cultures remain NGTD. ID consulted, appreciate assistance.    - Continue ceftaroline and vancomycin. Discontinue ceftaroline once vancomycin trough therapeutic at 15-20. Can discharge to rehab on vancomycin as [facility] does not carry ceftaroline.    - Plan on Vanc x 6 weeks from sx (BRIT 1/19/2021) then suppression with Doxycycline    - Follow up repeat Vanc trough tonight, Pharmacy consulted for dosing assistance  - Hypoxia: CXR 12/13 shows airspace disease, left>right. No pleural effusion.    - Supplemental oxygen via NC as needed.    - Wean oxygen to maintain O2 sats > 90%. Encourage IS use.  - Anemia: improved s/p transfusion 1U RBC 12/10, now stable. Will ctm and transfuse for Hb < 7 or symptomatic at Hb < 8.  - CAD, s/p Cardiac Stent: MI in 2000 which required stent placements. Resumed home ASA/Plavix 12/15.  - YOVANI: CPAP qhs at home settings  - HTN: SBP goal <160. HR 60s, home dose metoprolol dc'd by [facility] prior to stepdown, will continue to hold. Continue home dose Imdur and Lisinopril.  - Alzheimer's: Continue home memantine and donepezil  - HLD: Continue home pravastatin  - DVT  prophylaxis: SHAYLA's, SCD's, SQH. Screening BLE U/S 12/11 negative for DVT.  - Bowel regimen: senna BID  - Atelectasis prophylaxis: IS hourly, PT/OT     Contact Neurosurgery with any questions, concerns, or neuro changes.    Dispo: pending IPR, medically stable for discharge, likely tomorrow pending repeat vancomycin trough this evening.

## 2020-12-18 ENCOUNTER — HOSPITAL ENCOUNTER (INPATIENT)
Facility: HOSPITAL | Age: 70
LOS: 21 days | Discharge: HOME HEALTH | DRG: 552 | End: 2021-01-08
Attending: INTERNAL MEDICINE | Admitting: INTERNAL MEDICINE
Payer: MEDICARE

## 2020-12-18 VITALS
RESPIRATION RATE: 18 BRPM | WEIGHT: 293 LBS | OXYGEN SATURATION: 94 % | TEMPERATURE: 99 F | SYSTOLIC BLOOD PRESSURE: 103 MMHG | BODY MASS INDEX: 41.95 KG/M2 | DIASTOLIC BLOOD PRESSURE: 54 MMHG | HEART RATE: 56 BPM | HEIGHT: 70 IN

## 2020-12-18 DIAGNOSIS — M46.24 OSTEOMYELITIS OF THORACIC VERTEBRA: ICD-10-CM

## 2020-12-18 DIAGNOSIS — F01.50 VASCULAR DEMENTIA WITHOUT BEHAVIORAL DISTURBANCE: Primary | ICD-10-CM

## 2020-12-18 DIAGNOSIS — S24.109A: ICD-10-CM

## 2020-12-18 DIAGNOSIS — M46.44 DISCITIS OF THORACIC REGION: ICD-10-CM

## 2020-12-18 LAB
ACID FAST MOD KINY STN SPEC: NORMAL
MYCOBACTERIUM SPEC QL CULT: NORMAL

## 2020-12-18 PROCEDURE — 99900031 HC PATIENT EDUCATION (STAT)

## 2020-12-18 PROCEDURE — 63600175 PHARM REV CODE 636 W HCPCS: Performed by: PHYSICIAN ASSISTANT

## 2020-12-18 PROCEDURE — 25000003 PHARM REV CODE 250: Performed by: INTERNAL MEDICINE

## 2020-12-18 PROCEDURE — 99900035 HC TECH TIME PER 15 MIN (STAT)

## 2020-12-18 PROCEDURE — 92523 SPEECH SOUND LANG COMPREHEN: CPT

## 2020-12-18 PROCEDURE — 11000001 HC ACUTE MED/SURG PRIVATE ROOM

## 2020-12-18 PROCEDURE — 97167 OT EVAL HIGH COMPLEX 60 MIN: CPT

## 2020-12-18 PROCEDURE — 25000003 PHARM REV CODE 250: Performed by: PHYSICIAN ASSISTANT

## 2020-12-18 PROCEDURE — 63600175 PHARM REV CODE 636 W HCPCS: Mod: JG | Performed by: PHYSICIAN ASSISTANT

## 2020-12-18 PROCEDURE — 25000003 PHARM REV CODE 250: Performed by: STUDENT IN AN ORGANIZED HEALTH CARE EDUCATION/TRAINING PROGRAM

## 2020-12-18 PROCEDURE — 94761 N-INVAS EAR/PLS OXIMETRY MLT: CPT

## 2020-12-18 PROCEDURE — 94799 UNLISTED PULMONARY SVC/PX: CPT

## 2020-12-18 PROCEDURE — 99024 POSTOP FOLLOW-UP VISIT: CPT | Mod: POP,,, | Performed by: PHYSICIAN ASSISTANT

## 2020-12-18 PROCEDURE — 27000221 HC OXYGEN, UP TO 24 HOURS

## 2020-12-18 PROCEDURE — 63600175 PHARM REV CODE 636 W HCPCS: Performed by: INTERNAL MEDICINE

## 2020-12-18 PROCEDURE — 97163 PT EVAL HIGH COMPLEX 45 MIN: CPT

## 2020-12-18 PROCEDURE — 99024 PR POST-OP FOLLOW-UP VISIT: ICD-10-PCS | Mod: POP,,, | Performed by: PHYSICIAN ASSISTANT

## 2020-12-18 RX ORDER — PANTOPRAZOLE SODIUM 40 MG/1
40 TABLET, DELAYED RELEASE ORAL
Status: DISCONTINUED | OUTPATIENT
Start: 2020-12-19 | End: 2021-01-08 | Stop reason: HOSPADM

## 2020-12-18 RX ORDER — AMOXICILLIN 250 MG
1 CAPSULE ORAL 2 TIMES DAILY
Status: DISCONTINUED | OUTPATIENT
Start: 2020-12-18 | End: 2020-12-18

## 2020-12-18 RX ORDER — ENOXAPARIN SODIUM 100 MG/ML
40 INJECTION SUBCUTANEOUS EVERY 24 HOURS
Status: DISCONTINUED | OUTPATIENT
Start: 2020-12-19 | End: 2021-01-08 | Stop reason: HOSPADM

## 2020-12-18 RX ORDER — INSULIN ASPART 100 [IU]/ML
0-5 INJECTION, SOLUTION INTRAVENOUS; SUBCUTANEOUS
Status: DISCONTINUED | OUTPATIENT
Start: 2020-12-18 | End: 2020-12-18

## 2020-12-18 RX ORDER — IPRATROPIUM BROMIDE AND ALBUTEROL SULFATE 2.5; .5 MG/3ML; MG/3ML
3 SOLUTION RESPIRATORY (INHALATION) EVERY 6 HOURS PRN
Status: DISCONTINUED | OUTPATIENT
Start: 2020-12-18 | End: 2021-01-08 | Stop reason: HOSPADM

## 2020-12-18 RX ORDER — IBUPROFEN 200 MG
24 TABLET ORAL
Status: DISCONTINUED | OUTPATIENT
Start: 2020-12-18 | End: 2020-12-18

## 2020-12-18 RX ORDER — HYDROCODONE BITARTRATE AND ACETAMINOPHEN 500; 5 MG/1; MG/1
TABLET ORAL
Status: DISCONTINUED | OUTPATIENT
Start: 2020-12-18 | End: 2020-12-18

## 2020-12-18 RX ORDER — PRAVASTATIN SODIUM 40 MG/1
40 TABLET ORAL NIGHTLY
Status: DISCONTINUED | OUTPATIENT
Start: 2020-12-18 | End: 2021-01-08 | Stop reason: HOSPADM

## 2020-12-18 RX ORDER — MUPIROCIN 20 MG/G
OINTMENT TOPICAL 2 TIMES DAILY
Status: DISPENSED | OUTPATIENT
Start: 2020-12-18 | End: 2020-12-23

## 2020-12-18 RX ORDER — IBUPROFEN 200 MG
16 TABLET ORAL
Status: DISCONTINUED | OUTPATIENT
Start: 2020-12-18 | End: 2020-12-18

## 2020-12-18 RX ORDER — DONEPEZIL HYDROCHLORIDE 5 MG/1
10 TABLET, FILM COATED ORAL NIGHTLY
Status: DISCONTINUED | OUTPATIENT
Start: 2020-12-18 | End: 2021-01-08 | Stop reason: HOSPADM

## 2020-12-18 RX ORDER — OXYCODONE HYDROCHLORIDE 10 MG/1
10 TABLET ORAL EVERY 8 HOURS PRN
Status: DISCONTINUED | OUTPATIENT
Start: 2020-12-18 | End: 2021-01-08 | Stop reason: HOSPADM

## 2020-12-18 RX ORDER — FERROUS SULFATE 324(65)MG
325 TABLET, DELAYED RELEASE (ENTERIC COATED) ORAL NIGHTLY
Status: DISCONTINUED | OUTPATIENT
Start: 2020-12-18 | End: 2020-12-23

## 2020-12-18 RX ORDER — METHOCARBAMOL 750 MG/1
750 TABLET, FILM COATED ORAL 4 TIMES DAILY
Status: DISCONTINUED | OUTPATIENT
Start: 2020-12-18 | End: 2020-12-29

## 2020-12-18 RX ORDER — AMOXICILLIN 250 MG
2 CAPSULE ORAL NIGHTLY PRN
Status: DISCONTINUED | OUTPATIENT
Start: 2020-12-18 | End: 2020-12-18

## 2020-12-18 RX ORDER — DULOXETIN HYDROCHLORIDE 60 MG/1
60 CAPSULE, DELAYED RELEASE ORAL DAILY
Status: DISCONTINUED | OUTPATIENT
Start: 2020-12-19 | End: 2021-01-08 | Stop reason: HOSPADM

## 2020-12-18 RX ORDER — BACITRACIN ZINC 500 [USP'U]/G
OINTMENT TOPICAL 2 TIMES DAILY
Status: DISCONTINUED | OUTPATIENT
Start: 2020-12-18 | End: 2020-12-25

## 2020-12-18 RX ORDER — ACETAMINOPHEN 325 MG/1
650 TABLET ORAL EVERY 6 HOURS PRN
Status: DISCONTINUED | OUTPATIENT
Start: 2020-12-18 | End: 2021-01-08 | Stop reason: HOSPADM

## 2020-12-18 RX ORDER — CLOPIDOGREL BISULFATE 75 MG/1
75 TABLET ORAL DAILY
Status: DISCONTINUED | OUTPATIENT
Start: 2020-12-19 | End: 2021-01-08 | Stop reason: HOSPADM

## 2020-12-18 RX ORDER — AMOXICILLIN 250 MG
2 CAPSULE ORAL NIGHTLY PRN
Status: DISCONTINUED | OUTPATIENT
Start: 2020-12-18 | End: 2021-01-08 | Stop reason: HOSPADM

## 2020-12-18 RX ORDER — ACETAMINOPHEN 325 MG/1
650 TABLET ORAL EVERY 6 HOURS
Status: DISCONTINUED | OUTPATIENT
Start: 2020-12-18 | End: 2020-12-18

## 2020-12-18 RX ORDER — BACITRACIN ZINC 500 [USP'U]/G
OINTMENT TOPICAL 2 TIMES DAILY
Status: DISCONTINUED | OUTPATIENT
Start: 2020-12-18 | End: 2020-12-18

## 2020-12-18 RX ORDER — ISOSORBIDE MONONITRATE 30 MG/1
60 TABLET, EXTENDED RELEASE ORAL DAILY
Status: DISCONTINUED | OUTPATIENT
Start: 2020-12-19 | End: 2021-01-08 | Stop reason: HOSPADM

## 2020-12-18 RX ORDER — FUROSEMIDE 40 MG/1
40 TABLET ORAL DAILY
Status: DISCONTINUED | OUTPATIENT
Start: 2020-12-19 | End: 2021-01-08 | Stop reason: HOSPADM

## 2020-12-18 RX ORDER — BISACODYL 10 MG
10 SUPPOSITORY, RECTAL RECTAL DAILY
Status: DISCONTINUED | OUTPATIENT
Start: 2020-12-19 | End: 2020-12-18

## 2020-12-18 RX ORDER — MAG HYDROX/ALUMINUM HYD/SIMETH 200-200-20
30 SUSPENSION, ORAL (FINAL DOSE FORM) ORAL EVERY 4 HOURS PRN
Status: DISCONTINUED | OUTPATIENT
Start: 2020-12-18 | End: 2021-01-08 | Stop reason: HOSPADM

## 2020-12-18 RX ORDER — GLUCAGON 1 MG
1 KIT INJECTION
Status: DISCONTINUED | OUTPATIENT
Start: 2020-12-18 | End: 2020-12-18

## 2020-12-18 RX ORDER — MEMANTINE HYDROCHLORIDE 10 MG/1
10 TABLET ORAL 2 TIMES DAILY
Status: DISCONTINUED | OUTPATIENT
Start: 2020-12-18 | End: 2021-01-08 | Stop reason: HOSPADM

## 2020-12-18 RX ORDER — ASPIRIN 81 MG/1
81 TABLET ORAL DAILY
Status: DISCONTINUED | OUTPATIENT
Start: 2020-12-19 | End: 2021-01-08 | Stop reason: HOSPADM

## 2020-12-18 RX ADMIN — LISINOPRIL 2.5 MG: 2.5 TABLET ORAL at 08:12

## 2020-12-18 RX ADMIN — HEPARIN SODIUM 5000 UNITS: 5000 INJECTION INTRAVENOUS; SUBCUTANEOUS at 05:12

## 2020-12-18 RX ADMIN — VANCOMYCIN HYDROCHLORIDE 1750 MG: 1 INJECTION, POWDER, LYOPHILIZED, FOR SOLUTION INTRAVENOUS at 11:12

## 2020-12-18 RX ADMIN — CLOPIDOGREL 75 MG: 75 TABLET, FILM COATED ORAL at 08:12

## 2020-12-18 RX ADMIN — OXYCODONE HYDROCHLORIDE 10 MG: 10 TABLET ORAL at 05:12

## 2020-12-18 RX ADMIN — SENNOSIDES AND DOCUSATE SODIUM 1 TABLET: 8.6; 5 TABLET ORAL at 08:12

## 2020-12-18 RX ADMIN — MEMANTINE 10 MG: 10 TABLET ORAL at 08:12

## 2020-12-18 RX ADMIN — ACETAMINOPHEN 650 MG: 325 TABLET ORAL at 05:12

## 2020-12-18 RX ADMIN — BACITRACIN 1 EACH: 500 OINTMENT TOPICAL at 08:12

## 2020-12-18 RX ADMIN — METHOCARBAMOL TABLETS 750 MG: 750 TABLET, COATED ORAL at 04:12

## 2020-12-18 RX ADMIN — PRAVASTATIN SODIUM 40 MG: 40 TABLET ORAL at 08:12

## 2020-12-18 RX ADMIN — OXYCODONE HYDROCHLORIDE 10 MG: 10 TABLET ORAL at 09:12

## 2020-12-18 RX ADMIN — MEMANTINE HYDROCHLORIDE 10 MG: 10 TABLET ORAL at 08:12

## 2020-12-18 RX ADMIN — CEFTAROLINE FOSAMIL 600 MG: 600 POWDER, FOR SOLUTION INTRAVENOUS at 05:12

## 2020-12-18 RX ADMIN — DONEPEZIL HYDROCHLORIDE 10 MG: 5 TABLET ORAL at 08:12

## 2020-12-18 RX ADMIN — METHOCARBAMOL TABLETS 750 MG: 750 TABLET, COATED ORAL at 08:12

## 2020-12-18 RX ADMIN — ISOSORBIDE MONONITRATE 60 MG: 30 TABLET, EXTENDED RELEASE ORAL at 08:12

## 2020-12-18 RX ADMIN — ASPIRIN 81 MG: 81 TABLET, COATED ORAL at 08:12

## 2020-12-18 RX ADMIN — METHOCARBAMOL 750 MG: 750 TABLET ORAL at 08:12

## 2020-12-18 RX ADMIN — PANTOPRAZOLE SODIUM 40 MG: 40 TABLET, DELAYED RELEASE ORAL at 05:12

## 2020-12-18 RX ADMIN — BISACODYL 10 MG: 10 SUPPOSITORY RECTAL at 08:12

## 2020-12-18 RX ADMIN — DULOXETINE HYDROCHLORIDE 60 MG: 60 CAPSULE, DELAYED RELEASE ORAL at 08:12

## 2020-12-18 NOTE — DISCHARGE SUMMARY
[facility]  Neurosurgery  Discharge Summary      Patient Name: Martina Betancourt  MRN: [id number]  Admission Date: 12/1/2020  Hospital Length of Stay: 17 days  Discharge Date and Time: 12/18/2020 12:00 PM  Attending Physician: Everardo Gongora MD  Discharging Provider: Melody Santiago PA-C  Primary Care Provider: Joe Fuller Iii, MD    HPI:   70 y.o. female known to [facility] with PMH of Alzheimer's dz, HTN, HLD, CAD, YOVANI, and morbid obesity who was recently hospitalized x2 for MRSA bacteremia complicated by pneumonia and possible UTI, treated with linozolid x8 days and cipro and discharged 10/1. She was then readmitted to [facility] on 10/8 with severe back pain. MRI revealed T9-10 osteodiscitis and T8-10 epidural phlegmon with associated paraverterbral abscesses. Spine infection likely hematogenous from under-treated bacteremia. Patient had no neurologic deficits on exam, no indications for emergent neurosurgical intervention, plan was made for conservative non-surgical treatment. She underwent needle aspiration of T9-10 disc space on 10/16, cultures were negative although she had already been undergoing antibiotic treatment. Repeat BCx remained no growth and she remained afebrile and neurologically stable. Patient was discharged to [facility] on 10/20 with plan for Vancomycin IV x 8 weeks (estimated end date 12/3).    Patient presents today after interval imaging obtained 11/30 revealed worsening bony destruction, unstable T9 fracture. Transferred for [facility].     Procedure(s) (LRB):  T9-T10 corpectomy, posterior instrumented fusion T7-T12. (N/A)     Hospital Course: 12/2: Patient admitted due to progression of bony erosion at T9-10 from osteomyelitis, now with bilateral T9 pedicle fractures. NAEON. AFVSS. Patient reports mid-back pain, progressively worsening, radiating around to right side. Reports her leg strength has been improving. Denies new weakness, numbness, and b/b dysfunction. Plan for  2 level corpectomy and fusion, tentatively scheduled for 12/8. HM consulted for preop clearance. Continue current course of Vanc for prior MRSA infection, ID consulted for recs.  12/3: NAEON. Patient compliant with spinal precautions, HOB < 30. No changes on exam. Will obtain CT cervical spine to further assess prevertebral edema at C5-6 on MRI.  Patient denies neck pain or radicular upper extremity pain. Voiding spontaneously. Plan for OR Tuesday.  12/4: NAEON. Vitals and labs stable. Pt reports continued mid-back pain, somewhat improved on current pain regimen. Denies weakness, numbness/paresthesias, and b/b dysfunction. Denies neck pain. CT C-spine stable with degenerative changes, no significant prevertebral swelling. Pending OR Tuesday.  12/5: NAEO. AFVSS. Neuro stable. No complaints this morning. Plan for OR Tuesday.  12/6: NAEO. AFVSS. Neuro stable. No complaints this morning. Plan for OR Tuesday. Blood cultures with NGTD. Continue ceftaroline. Will get OR cultures. Continue SQH.  12/7: NAEON. AFVSS. Neuro exam stable. Reports pain in R knee today with movement after being turned last night. Otherwise no acute complaints. Denies new weakness and numbness/paresthesias. Urinating via Purewick, denies difficulty. Plan for OR tomorrow. Continue ceftaroline.  12/8: OR today, to ICU postop for close monitoring  12/9: NAEON. AFVSS. Patient 1 day S/P T9-10 corpectomy; T7-12 posterior fusion. Pain being managed with Oxycodone 5 & 10 mg PRN, as well as Morphine 2 mg. Patient endorses Nausea and is prescribed Phenergan. Nurse reports denied Phenergan when offered. Patient prescribed Ondansetron as well. Patient A&Ox3 and Neuro exam stable. Patient reports having BM yesterday. Sodium level 131 in the AM.  12/10: stepdown to NSGY, d/c Lopressor, continue abx for MRSA bacteremia, add regular diet, repeat CBC stable, replace lytes PRN  12/11: NAEON. AFVSS. No episodes of hypotension. Drain output decreased, will remove 1 HV  "today. H/H improved s/p transfusion of 1U yesterday, patient reports she "feels better." Remains on 3L via nasal cannula, neuro exam stable. Denies weakness, paresthesias, b/b dysfunction, chest pain, SOB.  12/12: No acute events overnight. Pt AAOx4 and denies any numbness and tingling. Pt on 3L NC. Pt c/o pain during shift. Prn pain medication given per MAR.  12/13: Pt AAOx4, VSS, tele in place - sinus ryann to NSR noted, sats stable on 2L NC, afebrile. Chest and abdominal xray completed at bedside today. K 3.1 this AM - IV replacement given per order. Abx continued. Pt c/o back pain - pt reports moderate relief with positioning and PRN medications. WV in place over back incision, good seal. L posterior hemovac drain in place at full suction. Skin bruised but intact - continuing to encourage frequent position changes but pt refuses some turns 2/2 pain. Pressure points protected and waffle mattress in place. Pt voids per external catheter. Pt with 1 moderate BM this shift.   12/14: NAEON. AFVSS. O2 sats stable on 2L via nasal cannula. Vanc subtherapeutic, discussed with CM regarding ability of rehab to continue vanc dosing as next trough due tomorrow evening. Reports she was able to sit EOB with therapy, not able to get OOB to chair. Bed mobility improved, able to turn self well. Neuro exam remains stable. Voiding spontaneously. WV and final HV removed. Denies chest pain, SOB. Pending discharge to [facility], medically stable.   12/15: NAEON. AFVSS. Slight improvement in pain control resulting in improved proximal pain limited weakness on exam. Medically stable for discharge to [facility]. Voiding spontaneously. Motivated to progress with therapy.   12/16: NAEON. AFVSS. Pt reports her back pain fluctuates, c/o of pain radiating to R hip which is not new. Controlled with current pain regimen. Denies any new/worsening weakness or numbness/paresthesias. Voiding spontaneously, BM yesterday. Has been able to stand with therapy. Last " Vanc trough 12.9, plan for DC to rehab once therapeutic.  12/17: UMU. AFVSS. Pt resting this morning, c/o continued incisional back pain and R hip pain. Neuro exam stable. RLE more limited today 2/2 hip pain but grossly full strength. Will check XR of pelvis. Denies any new weakness, paresthesias, and b/b dysfunction. Repeat Vanc trough tonight, likely plan for DC to rehab tomorrow.  12/18: UMU. AFVSS. Neuro stable. Denies new weakness, numbness, bowel or bladder dysfunction. Vanc dosage increased to reach therapeutic level. Patient without any further questions or concerns. Pelvic xray negative for any acute processes.     Consults: PT/OT  Consults (From admission, onward)        Status Ordering Provider     Inpatient consult to [facility] Medicine-General  Once     Provider:  (Not yet assigned)    Completed FABIANO BAUTISTA     Inpatient consult to Infectious Diseases  Once     Provider:  (Not yet assigned)    Completed FABIANO BAUTISTA          Significant Diagnostic Studies: Labs:   BMP:   Recent Labs   Lab 12/17/20  0500   GLU 75      K 4.1      CO2 29   BUN 5*   CREATININE 0.8   CALCIUM 9.4   MG 2.0   , CMP   Recent Labs   Lab 12/17/20  0500      K 4.1      CO2 29   GLU 75   BUN 5*   CREATININE 0.8   CALCIUM 9.4   PROT 5.4*   ALBUMIN 1.8*   BILITOT 0.2   ALKPHOS 102   AST 11   ALT 5*   ANIONGAP 6*   ESTGFRAFRICA >60.0   EGFRNONAA >60.0   , CBC   Recent Labs   Lab 12/17/20  0500   WBC 7.15   HGB 8.5*   HCT 29.0*   *   , INR   Lab Results   Component Value Date    INR 1.0 12/07/2020    INR 0.9 12/02/2020    INR 1.1 10/15/2020   , Lipid Panel   Lab Results   Component Value Date    CHOL 93 (L) 08/25/2020    HDL 27 (L) 08/25/2020    LDLCALC 43.8 (L) 08/25/2020    TRIG 111 08/25/2020    CHOLHDL 29.0 08/25/2020   , Troponin No results for input(s): TROPONINI in the last 168 hours. and A1C:   Recent Labs   Lab 12/10/20  1217   HGBA1C 4.8     Radiology: CXR, XR pelvis, abdominal xray,  BLEUS, Thoracic xray, CT c-spine    Pending Diagnostic Studies:     Procedure Component Value Units Date/Time    COVID-19 Routine Screening Extended Care Placement [[id number]] Collected: 12/17/20 1038    Order Status: Sent Lab Status: In process Updated: 12/17/20 1038    Specimen: Nasopharyngeal     CT Interoperative Limited [[id number]] Resulted: 12/08/20 1823    Order Status: Sent Lab Status: In process Updated: 12/08/20 1824    Sodium [[id number]] Collected: 12/09/20 1357    Order Status: Sent Lab Status: In process Updated: 12/09/20 1358    Specimen: Blood         Final Active Diagnoses:    Diagnosis Date Noted POA    PRINCIPAL PROBLEM:  Osteomyelitis of thoracic vertebra [M46.24] 12/02/2020 Yes    Hyponatremia [E87.1] 12/09/2020 Unknown    Pre-operative cardiovascular examination [Z01.810] 12/02/2020 Not Applicable    Thoracic spine fracture [S22.009A]  Yes    Debility [R53.81] 10/21/2020 Yes    HTN (hypertension) [I10] 10/08/2020 Yes    Discitis of thoracic region [M46.44] 10/07/2020 Yes    Obstructive sleep apnea treated with continuous positive airway pressure (CPAP) [G47.33, Z99.89] 08/10/2020 Not Applicable    Severe obesity (BMI >= 40) [E66.01] 08/10/2020 Yes    MRSA bacteremia [R78.81, B95.62] 07/26/2020 Yes    Dementia without behavioral disturbance [F03.90] 07/26/2020 Yes      Problems Resolved During this Admission:      Discharged Condition: good    Disposition: Rehab Facility    Follow Up:  Follow-up Information     Joe Fuller Iii, MD.    Specialty: Internal Medicine  Why: Outpatient Services  Contact information:  [address] [place] [id number]  [phone]                 Patient Instructions:      X-Ray Thoracic Spine AP Lateral   Standing Status: Future Standing Exp. Date: 12/17/21     Order Specific Question Answer Comments   Reason for Exam: eval s/p thoracic corpectomy and fusion    May the Radiologist modify the order per protocol to meet the clinical needs of the  patient? Yes      Ambulatory referral/consult to Cardiology   Standing Status: Future   Referral Priority: Routine Referral Type: Consultation   Referral Reason: Specialty Services Required   Requested Specialty: Cardiology   Number of Visits Requested: 1     Notify your health care provider if you experience any of the following:  increased confusion or weakness     Notify your health care provider if you experience any of the following:  persistent dizziness, light-headedness, or visual disturbances     Notify your health care provider if you experience any of the following:  worsening rash     Notify your health care provider if you experience any of the following:  severe persistent headache     Notify your health care provider if you experience any of the following:  redness, tenderness, or signs of infection (pain, swelling, redness, odor or green/yellow discharge around incision site)     Notify your health care provider if you experience any of the following:  severe uncontrolled pain     Notify your health care provider if you experience any of the following:  persistent nausea and vomiting or diarrhea     Notify your health care provider if you experience any of the following:  temperature >100.4     Notify your health care provider if you experience any of the following:  difficulty breathing or increased cough     Activity as tolerated     Medications:  Reconciled Home Medications:      Medication List      START taking these medications    bacitracin zinc 500 unit/gram Oipk  Apply topically 2 (two) times daily.     oxyCODONE 10 mg Tab immediate release tablet  Commonly known as: ROXICODONE  Take 1 tablet (10 mg total) by mouth every 6 (six) hours as needed for Pain.     senna-docusate 8.6-50 mg 8.6-50 mg per tablet  Commonly known as: PERICOLACE  Take 2 tablets by mouth nightly as needed for Constipation.     vancomycin in 5 % dextrose 1.75 gram/500 mL Soln  1,750 mg, Intravenous, Administer over 120  Minutes, Every 24 hours (non-standard times), First dose (after last modification) on u 12/17/20 at 2200  Replaces: vancomycin in dextrose 5 % 1 gram/250 mL Soln        CHANGE how you take these medications    memantine 10 MG Tab  Commonly known as: NAMENDA  Take 1 tablet (10 mg total) by mouth 2 (two) times daily.  What changed: Another medication with the same name was removed. Continue taking this medication, and follow the directions you see here.     methocarbamoL 750 MG Tab  Commonly known as: ROBAXIN  Take 1 tablet (750 mg total) by mouth 4 (four) times daily. for 10 days  What changed:   · medication strength  · how much to take        CONTINUE taking these medications    acetaminophen 325 MG tablet  Commonly known as: TYLENOL  Take 2 tablets (650 mg total) by mouth every 6 (six) hours as needed (HEADACHE, TEMPERATURE - FEVER > 100.4).     albuterol-ipratropium 2.5 mg-0.5 mg/3 mL nebulizer solution  Commonly known as: DUO-NEB  Take 3 mLs by nebulization every 6 (six) hours as needed for Wheezing or Shortness of Breath. Rescue     aluminum & magnesium hydroxide-simethicone 400-400-40 mg/5 mL suspension  Commonly known as: MYLANTA MAX STRENGTH  Take 30 mLs by mouth every 6 (six) hours as needed for Indigestion.     aspirin 81 MG EC tablet  Commonly known as: ECOTRIN  Take 81 mg by mouth once daily.     clopidogreL 75 mg tablet  Commonly known as: PLAVIX  Take 1 tablet (75 mg total) by mouth once daily.     donepeziL 10 MG tablet  Commonly known as: ARICEPT  Take 10 mg by mouth every evening.     DULoxetine 60 MG capsule  Commonly known as: CYMBALTA  Take 60 mg by mouth once daily.     ferrous sulfate 324 mg (65 mg iron) Tbec  Take 325 mg by mouth every evening.     furosemide 40 MG tablet  Commonly known as: LASIX  Take 1 tablet (40 mg total) by mouth once daily.     isosorbide mononitrate 60 MG 24 hr tablet  Commonly known as: IMDUR  Take 60 mg by mouth once daily.     ondansetron 4 MG Tbdl  Commonly known  as: ZOFRAN-ODT  Take 1 tablet (4 mg total) by mouth every 8 (eight) hours as needed.     pantoprazole 40 MG tablet  Commonly known as: PROTONIX  Take 1 tablet (40 mg total) by mouth before breakfast.     pravastatin 40 MG tablet  Commonly known as: PRAVACHOL  Take 40 mg by mouth every evening.        STOP taking these medications    bisacodyL 5 mg EC tablet  Commonly known as: DULCOLAX     calcium carbonate 200 mg calcium (500 mg) chewable tablet  Commonly known as: TUMS     enoxaparin 40 mg/0.4 mL Syrg  Commonly known as: LOVENOX     guaiFENesin 600 mg 12 hr tablet  Commonly known as: MUCINEX     HYDROcodone-acetaminophen  mg per tablet  Commonly known as: NORCO     lisinopriL 10 MG tablet     lisinopriL 2.5 MG tablet  Commonly known as: PRINIVIL,ZESTRIL     melatonin 3 mg tablet  Commonly known as: MELATIN     metoprolol succinate 50 MG 24 hr tablet  Commonly known as: TOPROL-XL     miconazole nitrate 2% 2 % Oint  Commonly known as: MICOTIN     sodium chloride 0.9% injection  Commonly known as: NORMAL SALINE FLUSH     vancomycin in dextrose 5 % 1 gram/250 mL Soln  Replaced by: vancomycin in 5 % dextrose 1.75 gram/500 mL Soln            Melody Santiago PA-C  Neurosurgery  [facility]

## 2020-12-18 NOTE — PLAN OF CARE
12/18/20 0924   Post-Acute Status   Post-Acute Authorization Placement   Post-Acute Placement Status Set-up Complete       Pt has been accepted by [facility].  SW in contact with CM and Medical staff. Will continue to follow and offer support as needed.     [place], [place]  [facility]   Ext. [phone]

## 2020-12-18 NOTE — SUBJECTIVE & OBJECTIVE
Interval History: NAEON. AFVSS. Neuro stable. Denies new weakness, numbness, bowel or bladder dysfunction. Vanc dosage increased to reach therapeutic level. Patient without any further questions or concerns. Pelvic xray negative for any acute processes.     Medications:  Continuous Infusions:  Scheduled Meds:  PRN Meds:     Review of Systems  Objective:     Weight: (!) 141.3 kg (311 lb 8.2 oz)  Body mass index is 44.7 kg/m².  Vital Signs (Most Recent):  Temp: 98.8 °F (37.1 °C) (12/18/20 0832)  Pulse: (!) 56 (12/18/20 0832)  Resp: 18 (12/18/20 0932)  BP: (!) 103/54 (12/18/20 0832)  SpO2: (!) 94 % (12/18/20 0832) Vital Signs (24h Range):  Temp:  [97.7 °F (36.5 °C)-99.1 °F (37.3 °C)] 97.7 °F (36.5 °C)  Pulse:  [56-67] 57  Resp:  [15-22] 22  SpO2:  [92 %-97 %] 97 %  BP: (103-120)/(54-73) 115/56                          Closed/Suction Drain 12/08/20 1239 Right Back Accordion 10 Fr. (Active)   Site Description Unable to view 12/13/20 2000   Dressing Type Transparent (Tegaderm) 12/13/20 2000   Dressing Status Clean;Dry;Intact 12/13/20 2000   Dressing Intervention Integrity maintained 12/13/20 2000   Drainage Serosanguineous 12/13/20 2000   Status To bulb suction 12/13/20 2000   Output (mL) 0 mL 12/13/20 1800            Closed/Suction Drain 12/08/20 1239 Left Back Accordion 10 Fr. (Active)   Site Description Unable to view 12/13/20 2000   Dressing Type Transparent (Tegaderm) 12/13/20 2000   Dressing Status Clean;Dry;Intact 12/13/20 2000   Dressing Intervention Integrity maintained 12/13/20 2000   Drainage Serosanguineous 12/13/20 2000   Status To bulb suction 12/13/20 2000   Output (mL) 0 mL 12/13/20 1800       Female External Urinary Catheter 12/09/20 1741 (Active)   Skin no breakdown;perineum cleansed w/ soap and water;female external urine collection device repositioned 12/16/20 2000   Tolerance no signs/symptoms of discomfort 12/16/20 2000   Suction Continuous suction at 70 mmHg 12/16/20 2000   Date of last wick change  12/16/20 12/16/20 2000   Time of last wick change 0800 12/16/20 0800   Output (mL) 300 mL 12/15/20 0800       Neurosurgery Physical Exam  General: well developed, well nourished, no distress.   Head: normocephalic, atraumatic  Neck: No tracheal deviation.  Neurologic: Alert and oriented. Thought content appropriate.  GCS: Motor: 6/Verbal: 5/Eyes: 4 GCS Total: 15  Mental Status: Awake, Alert, Oriented x 4  Language: No aphasia  Speech: No dysarthria  Cranial nerves: face symmetric, tongue midline, CN II-XII grossly intact.   Eyes: pupils equal, round, reactive to light with accomodation, EOMI.   Ears: No drainage.   Pulmonary: normal respirations, no signs of respiratory distress, nasal cannula in place  Abdomen: soft, non-distended, not tender to palpation  Sensory: intact to light touch throughout  Motor Strength: Moves all extremities spontaneously with good tone.  Full strength upper and lower extremities. BLE ROM limited 2/2 pain and body habitus, R>L. Grossly full strength. No abnormal movements seen.      Strength   Deltoids Triceps Biceps Wrist Extension Wrist Flexion Hand    Upper: R 5/5 5/5 5/5 5/5 5/5 5/5     L 5/5 5/5 5/5 5/5 5/5 5/5       Iliopsoas Quadriceps Knee  Flexion Tibialis  anterior Gastro- cnemius EHL   Lower: R 4+/5 5/5 5/5 5/5 5/5 5/5     L 4+/5 5/5 5/5 5/5 5/5 5/5      Vascular: No LE edema.   Skin: Skin is warm, dry and intact.     Thoracic Incision c/d/I with skin edges well approximated with staples. No surrounding erythema or edema. No drainage from incision.       Significant Labs:  Recent Labs   Lab 12/17/20  0500   GLU 75      K 4.1      CO2 29   BUN 5*   CREATININE 0.8   CALCIUM 9.4   MG 2.0     Recent Labs   Lab 12/17/20  0500   WBC 7.15   HGB 8.5*   HCT 29.0*   *     No results for input(s): LABPT, INR, APTT in the last 48 hours.  Microbiology Results (last 7 days)     Procedure Component Value Units Date/Time    Culture, Anaerobe [[id number]] Collected:  12/08/20 1139    Order Status: Completed Specimen: Back Updated: 12/15/20 0740     Anaerobic Culture No anaerobes isolated    Narrative:      T9-10 disc space    Culture, Anaerobe [[id number]] Collected: 12/08/20 1139    Order Status: Completed Specimen: Back Updated: 12/15/20 0740     Anaerobic Culture No anaerobes isolated    Narrative:      T9-10 disc space    Aerobic culture [[id number]] Collected: 12/08/20 1139    Order Status: Completed Specimen: Back Updated: 12/12/20 1007     Aerobic Bacterial Culture No growth    Narrative:      T9-10 disc space        All pertinent labs from the last 24 hours have been reviewed.    Significant Diagnostics:  I have reviewed all pertinent imaging results/findings within the past 24 hours.

## 2020-12-18 NOTE — PROGRESS NOTES
[facility]  Neurosurgery  Progress Note    Subjective:     History of Present Illness: 70 y.o. [place] [facility] [place]H of Alzheimer's dz, HTN, HLD, CAD, YOVANI, and morbid obesity who was recently hospitalized x2 for MRSA bacteremia complicated by pneumonia and possible UTI, treated with linozolid x8 days and cipro and discharged 10/1. [place] [facility] [place] severe [place] pain. MRI revealed T9-10 osteodiscitis and T8-10 epidural phlegmon with associated paraverterbral abscesses. Spine infection likely hematogenous from under-treated bacteremia. Patient had no neurologic deficits on exam, no indications for emergent neurosurgical intervention, plan was made for conservative non-surgical treatment. She underwent needle aspiration of T9-10 disc space on 10/16, cultures were negative although she had already been undergoing antibiotic treatment. Repeat BCx remained no growth and she remained afebrile and neurologically stable. Patient was discharged to [facility] on 10/20 with plan for Vancomycin IV x 8 weeks (estimated end date 12/3).    Patient presents today after interval imaging obtained 11/30 revealed worsening bony destruction, unstable T9 fracture. Transferred for [facility].     Post-Op Info:  Procedure(s) (LRB):  T9-T10 corpectomy, posterior instrumented fusion T7-T12. (N/A)   10 Days Post-Op     Interval History: NAEON. AFVSS. Neuro stable. Denies new weakness, numbness, bowel or bladder dysfunction. Vanc dosage increased to reach therapeutic level. Patient without any further questions or concerns. Pelvic xray negative for any acute processes.     Medications:  Continuous Infusions:  Scheduled Meds:  PRN Meds:     Review of Systems  Objective:     Weight: (!) 141.3 kg (311 lb 8.2 oz)  Body mass index is 44.7 kg/m².  Vital Signs (Most Recent):  Temp: 98.8 °F (37.1 °C) (12/18/20 0832)  Pulse: (!) 56 (12/18/20 0832)  Resp: 18 (12/18/20 0932)  BP: (!) 103/54  (12/18/20 0832)  SpO2: (!) 94 % (12/18/20 0832) Vital Signs (24h Range):  Temp:  [97.7 °F (36.5 °C)-99.1 °F (37.3 °C)] 97.7 °F (36.5 °C)  Pulse:  [56-67] 57  Resp:  [15-22] 22  SpO2:  [92 %-97 %] 97 %  BP: (103-120)/(54-73) 115/56                          Closed/Suction Drain 12/08/20 1239 Right Back Accordion 10 Fr. (Active)   Site Description Unable to view 12/13/20 2000   Dressing Type Transparent (Tegaderm) 12/13/20 2000   Dressing Status Clean;Dry;Intact 12/13/20 2000   Dressing Intervention Integrity maintained 12/13/20 2000   Drainage Serosanguineous 12/13/20 2000   Status To bulb suction 12/13/20 2000   Output (mL) 0 mL 12/13/20 1800            Closed/Suction Drain 12/08/20 1239 Left Back Accordion 10 Fr. (Active)   Site Description Unable to view 12/13/20 2000   Dressing Type Transparent (Tegaderm) 12/13/20 2000   Dressing Status Clean;Dry;Intact 12/13/20 2000   Dressing Intervention Integrity maintained 12/13/20 2000   Drainage Serosanguineous 12/13/20 2000   Status To bulb suction 12/13/20 2000   Output (mL) 0 mL 12/13/20 1800       Female External Urinary Catheter 12/09/20 1741 (Active)   Skin no breakdown;perineum cleansed w/ soap and water;female external urine collection device repositioned 12/16/20 2000   Tolerance no signs/symptoms of discomfort 12/16/20 2000   Suction Continuous suction at 70 mmHg 12/16/20 2000   Date of last wick change 12/16/20 12/16/20 2000   Time of last wick change 0800 12/16/20 0800   Output (mL) 300 mL 12/15/20 0800       Neurosurgery Physical Exam  General: well developed, well nourished, no distress.   Head: normocephalic, atraumatic  Neck: No tracheal deviation.  Neurologic: Alert and oriented. Thought content appropriate.  GCS: Motor: 6/Verbal: 5/Eyes: 4 GCS Total: 15  Mental Status: Awake, Alert, Oriented x 4  Language: No aphasia  Speech: No dysarthria  Cranial nerves: face symmetric, tongue midline, CN II-XII grossly intact.   Eyes: pupils equal, round, reactive to  light with accomodation, EOMI.   Ears: No drainage.   Pulmonary: normal respirations, no signs of respiratory distress, nasal cannula in place  Abdomen: soft, non-distended, not tender to palpation  Sensory: intact to light touch throughout  Motor Strength: Moves all extremities spontaneously with good tone.  Full strength upper and lower extremities. BLE ROM limited 2/2 pain and body habitus, R>L. Grossly full strength. No abnormal movements seen.      Strength   Deltoids Triceps Biceps Wrist Extension Wrist Flexion Hand    Upper: R 5/5 5/5 5/5 5/5 5/5 5/5     L 5/5 5/5 5/5 5/5 5/5 5/5       Iliopsoas Quadriceps Knee  Flexion Tibialis  anterior Gastro- cnemius EHL   Lower: R 4+/5 5/5 5/5 5/5 5/5 5/5     L 4+/5 5/5 5/5 5/5 5/5 5/5      Vascular: No LE edema.   Skin: Skin is warm, dry and intact.     Thoracic Incision c/d/I with skin edges well approximated with staples. No surrounding erythema or edema. No drainage from incision.       Significant Labs:  Recent Labs   Lab 12/17/20  0500   GLU 75      K 4.1      CO2 29   BUN 5*   CREATININE 0.8   CALCIUM 9.4   MG 2.0     Recent Labs   Lab 12/17/20  0500   WBC 7.15   HGB 8.5*   HCT 29.0*   *     No results for input(s): LABPT, INR, APTT in the last 48 hours.  Microbiology Results (last 7 days)     Procedure Component Value Units Date/Time    Culture, Anaerobe [[id number]] Collected: 12/08/20 1139    Order Status: Completed Specimen: Back Updated: 12/15/20 0740     Anaerobic Culture No anaerobes isolated    Narrative:      T9-10 disc space    Culture, Anaerobe [[id number]] Collected: 12/08/20 1139    Order Status: Completed Specimen: Back Updated: 12/15/20 0740     Anaerobic Culture No anaerobes isolated    Narrative:      T9-10 disc space    Aerobic culture [[id number]] Collected: 12/08/20 1139    Order Status: Completed Specimen: Back Updated: 12/12/20 1007     Aerobic Bacterial Culture No growth    Narrative:      T9-10 disc space        All  pertinent labs from the last 24 hours have been reviewed.    Significant Diagnostics:  I have reviewed all pertinent imaging results/findings within the past 24 hours.    Assessment/Plan:     * Osteomyelitis of thoracic vertebra  70 F with known MRSA bacteremia with thoracic osteomyelitis presenting as transfer from SNF after CT T spine concerning for increased erosion of T9/T10 vertebral bodies as well as bilateral T9 pedicle fractures now s/p T9-10 corpectomy with T7-12 PSIF on 12/8:    Neurologically stable on exam.   - q4h neuro checks  - Post op imaging obtained when flat as patient is unable to sit up unassisted, satisfactory hardware placement.  - TLSO brace when up/OOB  - Keep incision open to air, bacitracin BID. Staples to be removed 12/22.  - Pain control: scheduled Tylenol and Robaxin, Oxycodone PRN  - Recommend repeat TTE as outpatient given changes from 8/10 - 10/12. Pt may have had NSTEMI. Endocarditis felt less likely.   - Will place ambulatory referral to Cardiology at discharge  - H/o MRSA Bacteremia: Afebrile, no leukocytosis. OR cultures remain NGTD. ID consulted, appreciate assistance.    - Continue ceftaroline and vancomycin. Discontinue ceftaroline once vancomycin trough therapeutic at 15-20. Can discharge to rehab on vancomycin as [facility] does not carry ceftaroline.    - Plan on Vanc x 6 weeks from sx (BRIT 1/19/2021) then suppression with Doxycycline    - Repeat Vanc trough 13.2, vanc dose increased to 1750 mg q24 hr.  - Hypoxia: CXR 12/13 shows airspace disease, left>right. No pleural effusion.    - Supplemental oxygen via NC as needed.    - Wean oxygen to maintain O2 sats > 90%. Encourage IS use.  - Anemia: improved s/p transfusion 1U RBC 12/10, now stable. Will ctm and transfuse for Hb < 7 or symptomatic at Hb < 8.  - CAD, s/p Cardiac Stent: MI in 2000 which required stent placements. Resumed home ASA/Plavix 12/15.  - YOVANI: CPAP qhs at home settings  - HTN: SBP goal <160. HR  60s, home dose metoprolol dc'd by NCC prior to stepdown, will continue to hold. Continue home dose Imdur and Lisinopril.  - Alzheimer's: Continue home memantine and donepezil  - HLD: Continue home pravastatin  - DVT prophylaxis: SHAYLA's, SCD's, SQH. Screening BLE U/S 12/11 negative for DVT.  - Bowel regimen: senna BID  - Atelectasis prophylaxis: IS hourly, PT/OT     Dispo: Medically stable for discharge to rehab. Incision care and activity recommendations reviewed. Plan of care discussed with patient and she voiced understanding. Her questions were all answered. Follow-up in Neurosurgery clinic arranged.           MRSA bacteremia            Melody Santiago PA-C  Neurosurgery  [facility]

## 2020-12-18 NOTE — PROGRESS NOTES
Pharmacokinetic Assessment Follow Up: IV Vancomycin    Vancomycin Regimen Assessment and Plan:  - Vancomycin trough level (23-hour level) resulted at 13.2 mcg/mL, which is considered subtherapeutic (goal: 15-20 mcg/mL)  - Stable serum creatinine  - Increase to vancomycin 1750 mg IV every 24 hours   - Next vancomycin level scheduled for 12/19 at 2100 or sooner if change in renal function    Drug levels (last 3 results):  Recent Labs   Lab Result Units 12/15/20  2041 12/17/20  2016   Vancomycin-Trough ug/mL 12.9 13.2       Pharmacy will continue to follow and monitor vancomycin.    Please contact pharmacy at extension [phone] for questions regarding this assessment.    Thank you for the consult,   Kassie Aguero       Patient brief summary:  Martina Betancourt is a 70 y.o. female initiated on antimicrobial therapy with IV Vancomycin for treatment of bone/joint infection      Drug Allergies:   Review of patient's allergies indicates:   Allergen Reactions    Hydroxyzine hcl     Tizanidine        Actual Body Weight:   141.3 kg    Renal Function:   Estimated Creatinine Clearance: 97.9 mL/min (based on SCr of 0.8 mg/dL).    Dialysis Method (if applicable):  N/A

## 2020-12-18 NOTE — PLAN OF CARE
POC reviewed with pt. Pt verbalized understanding. Questions and concerns addressed. No acute events overnight. Pt AAOx4. Pain managed with prn pain medication overnight. Fall/safety precautions maintained. Bed locked and in lowest position with call light within reach. See flowsheets for full assessment and VS information.       Problem: Adult Inpatient Plan of Care  Goal: Plan of Care Review  Outcome: Ongoing, Progressing  Goal: Optimal Comfort and Wellbeing  Outcome: Ongoing, Progressing     Problem: Fall Injury Risk  Goal: Absence of Fall and Fall-Related Injury  Outcome: Ongoing, Progressing

## 2020-12-18 NOTE — PLAN OF CARE
Patient discharged to [facility].  Care deferred to [facility].  Patient transported via stretcher.  Family notified of discharge.  Neurosurgery clinic to schedule follow up appointment.    Future Appointments   Date Time Provider Department Center   1/4/2021  3:00 PM Claudette Hernandez PA-C [facility] ID [address]   1/19/2021 11:00 AM Krystina Mendoza PA-C [facility] ID [address]   2/3/2021  9:30 AM [facility]   2/4/2021  3:00 PM Everardo Gongora MD [facility] [id number]       12/18/20 1235   Final Note   Assessment Type Final Discharge Note   Anticipated Discharge Disposition Rehab   Hospital Follow Up  Appt(s) scheduled?   (Neurosurgery clinic to schedule follow up appointment.)   Discharge plans and expectations educations in teach back method with documentation complete? Yes   Right Care Referral Info   Post Acute Recommendation IRF   Referral Type [facility]   Post-Acute Status   Post-Acute Authorization Placement   Post-Acute Placement Status Set-up Complete   Discharge Delays None known at this time

## 2020-12-18 NOTE — ASSESSMENT & PLAN NOTE
70 F with known MRSA bacteremia with thoracic osteomyelitis presenting as transfer from SNF after CT T spine concerning for increased erosion of T9/T10 vertebral bodies as well as bilateral T9 pedicle fractures now s/p T9-10 corpectomy with T7-12 PSIF on 12/8:    Neurologically stable on exam.   - q4h neuro checks  - Post op imaging obtained when flat as patient is unable to sit up unassisted, satisfactory hardware placement.  - TLSO brace when up/OOB  - Keep incision open to air, bacitracin BID. Staples to be removed 12/22.  - Pain control: scheduled Tylenol and Robaxin, Oxycodone PRN  - Recommend repeat TTE as outpatient given changes from 8/10 - 10/12. Pt may have had NSTEMI. Endocarditis felt less likely.   - Will place ambulatory referral to Cardiology at discharge  - H/o MRSA Bacteremia: Afebrile, no leukocytosis. OR cultures remain NGTD. ID consulted, appreciate assistance.    - Continue ceftaroline and vancomycin. Discontinue ceftaroline once vancomycin trough therapeutic at 15-20. Can discharge to rehab on vancomycin as [facility] does not carry ceftaroline.    - Plan on Vanc x 6 weeks from sx (BRIT 1/19/2021) then suppression with Doxycycline    - Repeat Vanc trough 13.2, vanc dose increased to 1750 mg q24 hr.  - Hypoxia: CXR 12/13 shows airspace disease, left>right. No pleural effusion.    - Supplemental oxygen via NC as needed.    - Wean oxygen to maintain O2 sats > 90%. Encourage IS use.  - Anemia: improved s/p transfusion 1U RBC 12/10, now stable. Will ctm and transfuse for Hb < 7 or symptomatic at Hb < 8.  - CAD, s/p Cardiac Stent: MI in 2000 which required stent placements. Resumed home ASA/Plavix 12/15.  - YOVANI: CPAP qhs at home settings  - HTN: SBP goal <160. HR 60s, home dose metoprolol dc'd by NCC prior to stepdown, will continue to hold. Continue home dose Imdur and Lisinopril.  - Alzheimer's: Continue home memantine and donepezil  - HLD: Continue home pravastatin  - DVT prophylaxis:  SHAYLA's, SCD's, SQH. Screening BLE U/S 12/11 negative for DVT.  - Bowel regimen: senna BID  - Atelectasis prophylaxis: IS hourly, PT/OT     Dispo: Medically stable for discharge to rehab. Incision care and activity recommendations reviewed. Plan of care discussed with patient and she voiced understanding. Her questions were all answered. Follow-up in Neurosurgery clinic arranged.

## 2020-12-18 NOTE — PLAN OF CARE
Patient to discharge to [facility].  Nurse to call report to [phone], nurse Bowden.  Stretcher transportation requested for 10:30 am.

## 2020-12-19 LAB
ALBUMIN SERPL BCP-MCNC: 1.6 G/DL (ref 3.5–5.2)
ALP SERPL-CCNC: 106 U/L (ref 55–135)
ALT SERPL W/O P-5'-P-CCNC: 6 U/L (ref 10–44)
ANION GAP SERPL CALC-SCNC: 2 MMOL/L (ref 8–16)
AST SERPL-CCNC: 7 U/L (ref 10–40)
BASOPHILS # BLD AUTO: 0.03 K/UL (ref 0–0.2)
BASOPHILS NFR BLD: 0.4 % (ref 0–1.9)
BILIRUB SERPL-MCNC: 0.2 MG/DL (ref 0.1–1)
BUN SERPL-MCNC: 5 MG/DL (ref 8–23)
CALCIUM SERPL-MCNC: 9.4 MG/DL (ref 8.7–10.5)
CHLORIDE SERPL-SCNC: 105 MMOL/L (ref 95–110)
CO2 SERPL-SCNC: 32 MMOL/L (ref 23–29)
CREAT SERPL-MCNC: 0.9 MG/DL (ref 0.5–1.4)
DIFFERENTIAL METHOD: ABNORMAL
EOSINOPHIL # BLD AUTO: 0.3 K/UL (ref 0–0.5)
EOSINOPHIL NFR BLD: 3.6 % (ref 0–8)
ERYTHROCYTE [DISTWIDTH] IN BLOOD BY AUTOMATED COUNT: 17.2 % (ref 11.5–14.5)
EST. GFR  (AFRICAN AMERICAN): >60 ML/MIN/1.73 M^2
EST. GFR  (NON AFRICAN AMERICAN): >60 ML/MIN/1.73 M^2
GLUCOSE SERPL-MCNC: 104 MG/DL (ref 70–110)
HCT VFR BLD AUTO: 28 % (ref 37–48.5)
HGB BLD-MCNC: 8.4 G/DL (ref 12–16)
IMM GRANULOCYTES # BLD AUTO: 0.03 K/UL (ref 0–0.04)
IMM GRANULOCYTES NFR BLD AUTO: 0.4 % (ref 0–0.5)
LYMPHOCYTES # BLD AUTO: 1.8 K/UL (ref 1–4.8)
LYMPHOCYTES NFR BLD: 23 % (ref 18–48)
MAGNESIUM SERPL-MCNC: 1.8 MG/DL (ref 1.6–2.6)
MCH RBC QN AUTO: 27.9 PG (ref 27–31)
MCHC RBC AUTO-ENTMCNC: 30 G/DL (ref 32–36)
MCV RBC AUTO: 93 FL (ref 82–98)
MONOCYTES # BLD AUTO: 1.2 K/UL (ref 0.3–1)
MONOCYTES NFR BLD: 14.8 % (ref 4–15)
NEUTROPHILS # BLD AUTO: 4.5 K/UL (ref 1.8–7.7)
NEUTROPHILS NFR BLD: 57.8 % (ref 38–73)
NRBC BLD-RTO: 0 /100 WBC
PHOSPHATE SERPL-MCNC: 2.8 MG/DL (ref 2.7–4.5)
PLATELET # BLD AUTO: 359 K/UL (ref 150–350)
PMV BLD AUTO: 9.1 FL (ref 9.2–12.9)
POTASSIUM SERPL-SCNC: 3.7 MMOL/L (ref 3.5–5.1)
PROT SERPL-MCNC: 5.3 G/DL (ref 6–8.4)
RBC # BLD AUTO: 3.01 M/UL (ref 4–5.4)
SODIUM SERPL-SCNC: 139 MMOL/L (ref 136–145)
TSH SERPL DL<=0.005 MIU/L-ACNC: 0.96 UIU/ML (ref 0.4–4)
VANCOMYCIN TROUGH SERPL-MCNC: 23.8 UG/ML (ref 10–22)
WBC # BLD AUTO: 7.75 K/UL (ref 3.9–12.7)

## 2020-12-19 PROCEDURE — 63600175 PHARM REV CODE 636 W HCPCS: Performed by: INTERNAL MEDICINE

## 2020-12-19 PROCEDURE — 99900031 HC PATIENT EDUCATION (STAT)

## 2020-12-19 PROCEDURE — 25000003 PHARM REV CODE 250: Performed by: INTERNAL MEDICINE

## 2020-12-19 PROCEDURE — 11000001 HC ACUTE MED/SURG PRIVATE ROOM

## 2020-12-19 PROCEDURE — 80053 COMPREHEN METABOLIC PANEL: CPT

## 2020-12-19 PROCEDURE — 94799 UNLISTED PULMONARY SVC/PX: CPT

## 2020-12-19 PROCEDURE — 80202 ASSAY OF VANCOMYCIN: CPT

## 2020-12-19 PROCEDURE — 99900035 HC TECH TIME PER 15 MIN (STAT)

## 2020-12-19 PROCEDURE — 36415 COLL VENOUS BLD VENIPUNCTURE: CPT

## 2020-12-19 PROCEDURE — 94761 N-INVAS EAR/PLS OXIMETRY MLT: CPT

## 2020-12-19 PROCEDURE — 84443 ASSAY THYROID STIM HORMONE: CPT

## 2020-12-19 PROCEDURE — 84100 ASSAY OF PHOSPHORUS: CPT

## 2020-12-19 PROCEDURE — 83735 ASSAY OF MAGNESIUM: CPT

## 2020-12-19 PROCEDURE — 85025 COMPLETE CBC W/AUTO DIFF WBC: CPT

## 2020-12-19 RX ADMIN — METHOCARBAMOL TABLETS 750 MG: 750 TABLET, COATED ORAL at 08:12

## 2020-12-19 RX ADMIN — OXYCODONE HYDROCHLORIDE 10 MG: 10 TABLET ORAL at 08:12

## 2020-12-19 RX ADMIN — ASPIRIN 81 MG: 81 TABLET, COATED ORAL at 08:12

## 2020-12-19 RX ADMIN — MEMANTINE 10 MG: 10 TABLET ORAL at 08:12

## 2020-12-19 RX ADMIN — ENOXAPARIN SODIUM 40 MG: 40 INJECTION SUBCUTANEOUS at 05:12

## 2020-12-19 RX ADMIN — BACITRACIN ZINC: 500 OINTMENT TOPICAL at 08:12

## 2020-12-19 RX ADMIN — ACETAMINOPHEN 650 MG: 325 TABLET ORAL at 12:12

## 2020-12-19 RX ADMIN — METHOCARBAMOL TABLETS 750 MG: 750 TABLET, COATED ORAL at 05:12

## 2020-12-19 RX ADMIN — PRAVASTATIN SODIUM 40 MG: 40 TABLET ORAL at 08:12

## 2020-12-19 RX ADMIN — PANTOPRAZOLE SODIUM 40 MG: 40 TABLET, DELAYED RELEASE ORAL at 05:12

## 2020-12-19 RX ADMIN — ISOSORBIDE MONONITRATE 60 MG: 30 TABLET, EXTENDED RELEASE ORAL at 08:12

## 2020-12-19 RX ADMIN — DONEPEZIL HYDROCHLORIDE 10 MG: 5 TABLET ORAL at 08:12

## 2020-12-19 RX ADMIN — FUROSEMIDE 40 MG: 40 TABLET ORAL at 08:12

## 2020-12-19 RX ADMIN — METHOCARBAMOL TABLETS 750 MG: 750 TABLET, COATED ORAL at 12:12

## 2020-12-19 RX ADMIN — OXYCODONE HYDROCHLORIDE 10 MG: 10 TABLET ORAL at 03:12

## 2020-12-19 RX ADMIN — CLOPIDOGREL 75 MG: 75 TABLET, FILM COATED ORAL at 08:12

## 2020-12-19 RX ADMIN — DULOXETINE HYDROCHLORIDE 60 MG: 60 CAPSULE, DELAYED RELEASE ORAL at 08:12

## 2020-12-19 RX ADMIN — MUPIROCIN: 20 OINTMENT TOPICAL at 08:12

## 2020-12-20 PROCEDURE — 94761 N-INVAS EAR/PLS OXIMETRY MLT: CPT

## 2020-12-20 PROCEDURE — 63600175 PHARM REV CODE 636 W HCPCS: Performed by: INTERNAL MEDICINE

## 2020-12-20 PROCEDURE — 25000003 PHARM REV CODE 250: Performed by: INTERNAL MEDICINE

## 2020-12-20 PROCEDURE — 99900035 HC TECH TIME PER 15 MIN (STAT)

## 2020-12-20 PROCEDURE — 99900031 HC PATIENT EDUCATION (STAT)

## 2020-12-20 PROCEDURE — 11000001 HC ACUTE MED/SURG PRIVATE ROOM

## 2020-12-20 PROCEDURE — 94799 UNLISTED PULMONARY SVC/PX: CPT

## 2020-12-20 RX ADMIN — BACITRACIN ZINC: 500 OINTMENT TOPICAL at 08:12

## 2020-12-20 RX ADMIN — PANTOPRAZOLE SODIUM 40 MG: 40 TABLET, DELAYED RELEASE ORAL at 08:12

## 2020-12-20 RX ADMIN — MUPIROCIN: 20 OINTMENT TOPICAL at 08:12

## 2020-12-20 RX ADMIN — ACETAMINOPHEN 650 MG: 325 TABLET ORAL at 12:12

## 2020-12-20 RX ADMIN — MEMANTINE 10 MG: 10 TABLET ORAL at 08:12

## 2020-12-20 RX ADMIN — METHOCARBAMOL TABLETS 750 MG: 750 TABLET, COATED ORAL at 12:12

## 2020-12-20 RX ADMIN — FUROSEMIDE 40 MG: 40 TABLET ORAL at 08:12

## 2020-12-20 RX ADMIN — DONEPEZIL HYDROCHLORIDE 10 MG: 5 TABLET ORAL at 08:12

## 2020-12-20 RX ADMIN — METHOCARBAMOL TABLETS 750 MG: 750 TABLET, COATED ORAL at 08:12

## 2020-12-20 RX ADMIN — ISOSORBIDE MONONITRATE 60 MG: 30 TABLET, EXTENDED RELEASE ORAL at 08:12

## 2020-12-20 RX ADMIN — CLOPIDOGREL 75 MG: 75 TABLET, FILM COATED ORAL at 08:12

## 2020-12-20 RX ADMIN — METHOCARBAMOL TABLETS 750 MG: 750 TABLET, COATED ORAL at 05:12

## 2020-12-20 RX ADMIN — DULOXETINE HYDROCHLORIDE 60 MG: 60 CAPSULE, DELAYED RELEASE ORAL at 08:12

## 2020-12-20 RX ADMIN — ASPIRIN 81 MG: 81 TABLET, COATED ORAL at 08:12

## 2020-12-20 RX ADMIN — PRAVASTATIN SODIUM 40 MG: 40 TABLET ORAL at 08:12

## 2020-12-20 RX ADMIN — VANCOMYCIN HYDROCHLORIDE 1500 MG: 1.5 INJECTION, POWDER, LYOPHILIZED, FOR SOLUTION INTRAVENOUS at 12:12

## 2020-12-20 RX ADMIN — ENOXAPARIN SODIUM 40 MG: 40 INJECTION SUBCUTANEOUS at 05:12

## 2020-12-20 RX ADMIN — OXYCODONE HYDROCHLORIDE 10 MG: 10 TABLET ORAL at 04:12

## 2020-12-21 PROBLEM — I10 HTN (HYPERTENSION): Status: RESOLVED | Noted: 2020-10-08 | Resolved: 2020-12-21

## 2020-12-21 LAB
ALBUMIN SERPL BCP-MCNC: 1.8 G/DL (ref 3.5–5.2)
ALP SERPL-CCNC: 121 U/L (ref 55–135)
ALT SERPL W/O P-5'-P-CCNC: 8 U/L (ref 10–44)
ANION GAP SERPL CALC-SCNC: 4 MMOL/L (ref 8–16)
AST SERPL-CCNC: 9 U/L (ref 10–40)
BASOPHILS # BLD AUTO: 0.03 K/UL (ref 0–0.2)
BASOPHILS NFR BLD: 0.4 % (ref 0–1.9)
BILIRUB SERPL-MCNC: 0.3 MG/DL (ref 0.1–1)
BUN SERPL-MCNC: 5 MG/DL (ref 8–23)
CALCIUM SERPL-MCNC: 9.4 MG/DL (ref 8.7–10.5)
CHLORIDE SERPL-SCNC: 103 MMOL/L (ref 95–110)
CO2 SERPL-SCNC: 33 MMOL/L (ref 23–29)
CREAT SERPL-MCNC: 1 MG/DL (ref 0.5–1.4)
DIFFERENTIAL METHOD: ABNORMAL
EOSINOPHIL # BLD AUTO: 0.2 K/UL (ref 0–0.5)
EOSINOPHIL NFR BLD: 3.1 % (ref 0–8)
ERYTHROCYTE [DISTWIDTH] IN BLOOD BY AUTOMATED COUNT: 17.1 % (ref 11.5–14.5)
ERYTHROCYTE [DISTWIDTH] IN BLOOD BY AUTOMATED COUNT: 17.1 % (ref 11.5–14.5)
EST. GFR  (AFRICAN AMERICAN): >60 ML/MIN/1.73 M^2
EST. GFR  (NON AFRICAN AMERICAN): 56.4 ML/MIN/1.73 M^2
GLUCOSE SERPL-MCNC: 76 MG/DL (ref 70–110)
HCT VFR BLD AUTO: 30.8 % (ref 37–48.5)
HCT VFR BLD AUTO: 30.8 % (ref 37–48.5)
HGB BLD-MCNC: 9.2 G/DL (ref 12–16)
HGB BLD-MCNC: 9.2 G/DL (ref 12–16)
IMM GRANULOCYTES # BLD AUTO: 0.01 K/UL (ref 0–0.04)
IMM GRANULOCYTES NFR BLD AUTO: 0.1 % (ref 0–0.5)
LYMPHOCYTES # BLD AUTO: 1.8 K/UL (ref 1–4.8)
LYMPHOCYTES NFR BLD: 25.2 % (ref 18–48)
MAGNESIUM SERPL-MCNC: 2 MG/DL (ref 1.6–2.6)
MCH RBC QN AUTO: 27.5 PG (ref 27–31)
MCH RBC QN AUTO: 27.5 PG (ref 27–31)
MCHC RBC AUTO-ENTMCNC: 29.9 G/DL (ref 32–36)
MCHC RBC AUTO-ENTMCNC: 29.9 G/DL (ref 32–36)
MCV RBC AUTO: 92 FL (ref 82–98)
MCV RBC AUTO: 92 FL (ref 82–98)
MONOCYTES # BLD AUTO: 1 K/UL (ref 0.3–1)
MONOCYTES NFR BLD: 14.4 % (ref 4–15)
NEUTROPHILS # BLD AUTO: 4.1 K/UL (ref 1.8–7.7)
NEUTROPHILS NFR BLD: 56.8 % (ref 38–73)
NRBC BLD-RTO: 0 /100 WBC
PHOSPHATE SERPL-MCNC: 3.2 MG/DL (ref 2.7–4.5)
PLATELET # BLD AUTO: 407 K/UL (ref 150–350)
PLATELET # BLD AUTO: 407 K/UL (ref 150–350)
PMV BLD AUTO: 9.5 FL (ref 9.2–12.9)
PMV BLD AUTO: 9.5 FL (ref 9.2–12.9)
POCT GLUCOSE: 379 MG/DL (ref 70–110)
POTASSIUM SERPL-SCNC: 3.3 MMOL/L (ref 3.5–5.1)
PROT SERPL-MCNC: 5.8 G/DL (ref 6–8.4)
RBC # BLD AUTO: 3.35 M/UL (ref 4–5.4)
RBC # BLD AUTO: 3.35 M/UL (ref 4–5.4)
SODIUM SERPL-SCNC: 140 MMOL/L (ref 136–145)
WBC # BLD AUTO: 7.15 K/UL (ref 3.9–12.7)
WBC # BLD AUTO: 7.15 K/UL (ref 3.9–12.7)

## 2020-12-21 PROCEDURE — 63600175 PHARM REV CODE 636 W HCPCS: Performed by: INTERNAL MEDICINE

## 2020-12-21 PROCEDURE — 97110 THERAPEUTIC EXERCISES: CPT

## 2020-12-21 PROCEDURE — 94799 UNLISTED PULMONARY SVC/PX: CPT

## 2020-12-21 PROCEDURE — 97530 THERAPEUTIC ACTIVITIES: CPT

## 2020-12-21 PROCEDURE — 11000001 HC ACUTE MED/SURG PRIVATE ROOM

## 2020-12-21 PROCEDURE — 25000003 PHARM REV CODE 250: Performed by: INTERNAL MEDICINE

## 2020-12-21 PROCEDURE — 83735 ASSAY OF MAGNESIUM: CPT

## 2020-12-21 PROCEDURE — 25000003 PHARM REV CODE 250: Performed by: NURSE PRACTITIONER

## 2020-12-21 PROCEDURE — 94760 N-INVAS EAR/PLS OXIMETRY 1: CPT

## 2020-12-21 PROCEDURE — 80053 COMPREHEN METABOLIC PANEL: CPT

## 2020-12-21 PROCEDURE — 99900035 HC TECH TIME PER 15 MIN (STAT)

## 2020-12-21 PROCEDURE — 84100 ASSAY OF PHOSPHORUS: CPT

## 2020-12-21 PROCEDURE — 85025 COMPLETE CBC W/AUTO DIFF WBC: CPT

## 2020-12-21 RX ORDER — POTASSIUM CHLORIDE 20 MEQ/1
40 TABLET, EXTENDED RELEASE ORAL ONCE
Status: COMPLETED | OUTPATIENT
Start: 2020-12-21 | End: 2020-12-21

## 2020-12-21 RX ORDER — ONDANSETRON 4 MG/1
4 TABLET, ORALLY DISINTEGRATING ORAL EVERY 6 HOURS PRN
Status: DISCONTINUED | OUTPATIENT
Start: 2020-12-21 | End: 2021-01-08 | Stop reason: HOSPADM

## 2020-12-21 RX ADMIN — FUROSEMIDE 40 MG: 40 TABLET ORAL at 08:12

## 2020-12-21 RX ADMIN — ONDANSETRON 4 MG: 4 TABLET, ORALLY DISINTEGRATING ORAL at 01:12

## 2020-12-21 RX ADMIN — BACITRACIN ZINC: 500 OINTMENT TOPICAL at 08:12

## 2020-12-21 RX ADMIN — DONEPEZIL HYDROCHLORIDE 10 MG: 5 TABLET ORAL at 08:12

## 2020-12-21 RX ADMIN — MUPIROCIN: 20 OINTMENT TOPICAL at 08:12

## 2020-12-21 RX ADMIN — VANCOMYCIN HYDROCHLORIDE 1500 MG: 1.5 INJECTION, POWDER, LYOPHILIZED, FOR SOLUTION INTRAVENOUS at 11:12

## 2020-12-21 RX ADMIN — OXYCODONE HYDROCHLORIDE 10 MG: 10 TABLET ORAL at 02:12

## 2020-12-21 RX ADMIN — ENOXAPARIN SODIUM 40 MG: 40 INJECTION SUBCUTANEOUS at 05:12

## 2020-12-21 RX ADMIN — METHOCARBAMOL TABLETS 750 MG: 750 TABLET, COATED ORAL at 01:12

## 2020-12-21 RX ADMIN — METHOCARBAMOL TABLETS 750 MG: 750 TABLET, COATED ORAL at 08:12

## 2020-12-21 RX ADMIN — CLOPIDOGREL 75 MG: 75 TABLET, FILM COATED ORAL at 08:12

## 2020-12-21 RX ADMIN — POTASSIUM CHLORIDE 40 MEQ: 1500 TABLET, EXTENDED RELEASE ORAL at 05:12

## 2020-12-21 RX ADMIN — MEMANTINE 10 MG: 10 TABLET ORAL at 08:12

## 2020-12-21 RX ADMIN — PRAVASTATIN SODIUM 40 MG: 40 TABLET ORAL at 08:12

## 2020-12-21 RX ADMIN — METHOCARBAMOL TABLETS 750 MG: 750 TABLET, COATED ORAL at 05:12

## 2020-12-21 RX ADMIN — ISOSORBIDE MONONITRATE 60 MG: 30 TABLET, EXTENDED RELEASE ORAL at 08:12

## 2020-12-21 RX ADMIN — PANTOPRAZOLE SODIUM 40 MG: 40 TABLET, DELAYED RELEASE ORAL at 05:12

## 2020-12-21 RX ADMIN — DULOXETINE HYDROCHLORIDE 60 MG: 60 CAPSULE, DELAYED RELEASE ORAL at 08:12

## 2020-12-21 RX ADMIN — OXYCODONE HYDROCHLORIDE 10 MG: 10 TABLET ORAL at 04:12

## 2020-12-21 RX ADMIN — ASPIRIN 81 MG: 81 TABLET, COATED ORAL at 08:12

## 2020-12-22 LAB — VANCOMYCIN TROUGH SERPL-MCNC: 23.3 UG/ML (ref 10–22)

## 2020-12-22 PROCEDURE — 94799 UNLISTED PULMONARY SVC/PX: CPT

## 2020-12-22 PROCEDURE — 63600175 PHARM REV CODE 636 W HCPCS: Performed by: INTERNAL MEDICINE

## 2020-12-22 PROCEDURE — 99900035 HC TECH TIME PER 15 MIN (STAT)

## 2020-12-22 PROCEDURE — 99900031 HC PATIENT EDUCATION (STAT)

## 2020-12-22 PROCEDURE — 97530 THERAPEUTIC ACTIVITIES: CPT

## 2020-12-22 PROCEDURE — 36415 COLL VENOUS BLD VENIPUNCTURE: CPT

## 2020-12-22 PROCEDURE — 25000003 PHARM REV CODE 250: Performed by: INTERNAL MEDICINE

## 2020-12-22 PROCEDURE — 80202 ASSAY OF VANCOMYCIN: CPT

## 2020-12-22 PROCEDURE — 11000001 HC ACUTE MED/SURG PRIVATE ROOM

## 2020-12-22 PROCEDURE — 97110 THERAPEUTIC EXERCISES: CPT

## 2020-12-22 PROCEDURE — 94761 N-INVAS EAR/PLS OXIMETRY MLT: CPT

## 2020-12-22 PROCEDURE — 97535 SELF CARE MNGMENT TRAINING: CPT

## 2020-12-22 RX ADMIN — PANTOPRAZOLE SODIUM 40 MG: 40 TABLET, DELAYED RELEASE ORAL at 05:12

## 2020-12-22 RX ADMIN — METHOCARBAMOL TABLETS 750 MG: 750 TABLET, COATED ORAL at 09:12

## 2020-12-22 RX ADMIN — DULOXETINE HYDROCHLORIDE 60 MG: 60 CAPSULE, DELAYED RELEASE ORAL at 09:12

## 2020-12-22 RX ADMIN — OXYCODONE HYDROCHLORIDE 10 MG: 10 TABLET ORAL at 10:12

## 2020-12-22 RX ADMIN — MEMANTINE 10 MG: 10 TABLET ORAL at 09:12

## 2020-12-22 RX ADMIN — PRAVASTATIN SODIUM 40 MG: 40 TABLET ORAL at 09:12

## 2020-12-22 RX ADMIN — METHOCARBAMOL TABLETS 750 MG: 750 TABLET, COATED ORAL at 05:12

## 2020-12-22 RX ADMIN — MUPIROCIN: 20 OINTMENT TOPICAL at 09:12

## 2020-12-22 RX ADMIN — VANCOMYCIN HYDROCHLORIDE 1500 MG: 1.5 INJECTION, POWDER, LYOPHILIZED, FOR SOLUTION INTRAVENOUS at 09:12

## 2020-12-22 RX ADMIN — METHOCARBAMOL TABLETS 750 MG: 750 TABLET, COATED ORAL at 12:12

## 2020-12-22 RX ADMIN — FUROSEMIDE 40 MG: 40 TABLET ORAL at 09:12

## 2020-12-22 RX ADMIN — BACITRACIN ZINC: 500 OINTMENT TOPICAL at 09:12

## 2020-12-22 RX ADMIN — CLOPIDOGREL 75 MG: 75 TABLET, FILM COATED ORAL at 09:12

## 2020-12-22 RX ADMIN — ASPIRIN 81 MG: 81 TABLET, COATED ORAL at 09:12

## 2020-12-22 RX ADMIN — ISOSORBIDE MONONITRATE 60 MG: 30 TABLET, EXTENDED RELEASE ORAL at 09:12

## 2020-12-22 RX ADMIN — DONEPEZIL HYDROCHLORIDE 10 MG: 5 TABLET ORAL at 09:12

## 2020-12-22 RX ADMIN — ENOXAPARIN SODIUM 40 MG: 40 INJECTION SUBCUTANEOUS at 04:12

## 2020-12-23 LAB
ALBUMIN SERPL BCP-MCNC: 1.9 G/DL (ref 3.5–5.2)
ALP SERPL-CCNC: 124 U/L (ref 55–135)
ALT SERPL W/O P-5'-P-CCNC: 9 U/L (ref 10–44)
ANION GAP SERPL CALC-SCNC: 5 MMOL/L (ref 8–16)
AST SERPL-CCNC: 9 U/L (ref 10–40)
BASOPHILS # BLD AUTO: 0.03 K/UL (ref 0–0.2)
BASOPHILS NFR BLD: 0.5 % (ref 0–1.9)
BILIRUB SERPL-MCNC: 0.3 MG/DL (ref 0.1–1)
BUN SERPL-MCNC: 6 MG/DL (ref 8–23)
CALCIUM SERPL-MCNC: 9.3 MG/DL (ref 8.7–10.5)
CHLORIDE SERPL-SCNC: 102 MMOL/L (ref 95–110)
CO2 SERPL-SCNC: 34 MMOL/L (ref 23–29)
CREAT SERPL-MCNC: 1.1 MG/DL (ref 0.5–1.4)
DIFFERENTIAL METHOD: ABNORMAL
EOSINOPHIL # BLD AUTO: 0.2 K/UL (ref 0–0.5)
EOSINOPHIL NFR BLD: 2.6 % (ref 0–8)
ERYTHROCYTE [DISTWIDTH] IN BLOOD BY AUTOMATED COUNT: 16.9 % (ref 11.5–14.5)
EST. GFR  (AFRICAN AMERICAN): 58 ML/MIN/1.73 M^2
EST. GFR  (NON AFRICAN AMERICAN): 50.3 ML/MIN/1.73 M^2
GLUCOSE SERPL-MCNC: 83 MG/DL (ref 70–110)
HCT VFR BLD AUTO: 31.7 % (ref 37–48.5)
HGB BLD-MCNC: 9.4 G/DL (ref 12–16)
IMM GRANULOCYTES # BLD AUTO: 0.02 K/UL (ref 0–0.04)
IMM GRANULOCYTES NFR BLD AUTO: 0.3 % (ref 0–0.5)
LYMPHOCYTES # BLD AUTO: 1.3 K/UL (ref 1–4.8)
LYMPHOCYTES NFR BLD: 19.4 % (ref 18–48)
MAGNESIUM SERPL-MCNC: 2.1 MG/DL (ref 1.6–2.6)
MCH RBC QN AUTO: 27.9 PG (ref 27–31)
MCHC RBC AUTO-ENTMCNC: 29.7 G/DL (ref 32–36)
MCV RBC AUTO: 94 FL (ref 82–98)
MONOCYTES # BLD AUTO: 0.9 K/UL (ref 0.3–1)
MONOCYTES NFR BLD: 13.5 % (ref 4–15)
NEUTROPHILS # BLD AUTO: 4.2 K/UL (ref 1.8–7.7)
NEUTROPHILS NFR BLD: 63.7 % (ref 38–73)
NRBC BLD-RTO: 0 /100 WBC
PHOSPHATE SERPL-MCNC: 3.6 MG/DL (ref 2.7–4.5)
PLATELET # BLD AUTO: 410 K/UL (ref 150–350)
PMV BLD AUTO: 9.6 FL (ref 9.2–12.9)
POTASSIUM SERPL-SCNC: 3.1 MMOL/L (ref 3.5–5.1)
PROT SERPL-MCNC: 5.7 G/DL (ref 6–8.4)
RBC # BLD AUTO: 3.37 M/UL (ref 4–5.4)
SODIUM SERPL-SCNC: 141 MMOL/L (ref 136–145)
VANCOMYCIN TROUGH SERPL-MCNC: 26.2 UG/ML (ref 10–22)
WBC # BLD AUTO: 6.54 K/UL (ref 3.9–12.7)

## 2020-12-23 PROCEDURE — 36415 COLL VENOUS BLD VENIPUNCTURE: CPT

## 2020-12-23 PROCEDURE — 94761 N-INVAS EAR/PLS OXIMETRY MLT: CPT

## 2020-12-23 PROCEDURE — 97535 SELF CARE MNGMENT TRAINING: CPT

## 2020-12-23 PROCEDURE — 97110 THERAPEUTIC EXERCISES: CPT

## 2020-12-23 PROCEDURE — 97530 THERAPEUTIC ACTIVITIES: CPT

## 2020-12-23 PROCEDURE — 80053 COMPREHEN METABOLIC PANEL: CPT

## 2020-12-23 PROCEDURE — 63600175 PHARM REV CODE 636 W HCPCS: Performed by: INTERNAL MEDICINE

## 2020-12-23 PROCEDURE — 85025 COMPLETE CBC W/AUTO DIFF WBC: CPT

## 2020-12-23 PROCEDURE — 25000003 PHARM REV CODE 250: Performed by: NURSE PRACTITIONER

## 2020-12-23 PROCEDURE — 25000003 PHARM REV CODE 250: Performed by: INTERNAL MEDICINE

## 2020-12-23 PROCEDURE — 84100 ASSAY OF PHOSPHORUS: CPT

## 2020-12-23 PROCEDURE — 99900035 HC TECH TIME PER 15 MIN (STAT)

## 2020-12-23 PROCEDURE — 83735 ASSAY OF MAGNESIUM: CPT

## 2020-12-23 PROCEDURE — 80202 ASSAY OF VANCOMYCIN: CPT

## 2020-12-23 PROCEDURE — 99900031 HC PATIENT EDUCATION (STAT)

## 2020-12-23 PROCEDURE — 11000001 HC ACUTE MED/SURG PRIVATE ROOM

## 2020-12-23 RX ORDER — FERROUS SULFATE 325(65) MG
325 TABLET ORAL NIGHTLY
Status: DISCONTINUED | OUTPATIENT
Start: 2020-12-23 | End: 2021-01-08 | Stop reason: HOSPADM

## 2020-12-23 RX ADMIN — BACITRACIN ZINC: 500 OINTMENT TOPICAL at 09:12

## 2020-12-23 RX ADMIN — ASPIRIN 81 MG: 81 TABLET, COATED ORAL at 09:12

## 2020-12-23 RX ADMIN — METHOCARBAMOL TABLETS 750 MG: 750 TABLET, COATED ORAL at 04:12

## 2020-12-23 RX ADMIN — OXYCODONE HYDROCHLORIDE 10 MG: 10 TABLET ORAL at 09:12

## 2020-12-23 RX ADMIN — FUROSEMIDE 40 MG: 40 TABLET ORAL at 09:12

## 2020-12-23 RX ADMIN — PRAVASTATIN SODIUM 40 MG: 40 TABLET ORAL at 08:12

## 2020-12-23 RX ADMIN — METHOCARBAMOL TABLETS 750 MG: 750 TABLET, COATED ORAL at 01:12

## 2020-12-23 RX ADMIN — FERROUS SULFATE TAB 325 MG (65 MG ELEMENTAL FE) 325 MG: 325 (65 FE) TAB at 08:12

## 2020-12-23 RX ADMIN — PANTOPRAZOLE SODIUM 40 MG: 40 TABLET, DELAYED RELEASE ORAL at 05:12

## 2020-12-23 RX ADMIN — DONEPEZIL HYDROCHLORIDE 10 MG: 5 TABLET ORAL at 08:12

## 2020-12-23 RX ADMIN — OXYCODONE HYDROCHLORIDE 10 MG: 10 TABLET ORAL at 01:12

## 2020-12-23 RX ADMIN — MEMANTINE 10 MG: 10 TABLET ORAL at 08:12

## 2020-12-23 RX ADMIN — ISOSORBIDE MONONITRATE 60 MG: 30 TABLET, EXTENDED RELEASE ORAL at 09:12

## 2020-12-23 RX ADMIN — MUPIROCIN: 20 OINTMENT TOPICAL at 09:12

## 2020-12-23 RX ADMIN — ENOXAPARIN SODIUM 40 MG: 40 INJECTION SUBCUTANEOUS at 04:12

## 2020-12-23 RX ADMIN — MEMANTINE 10 MG: 10 TABLET ORAL at 09:12

## 2020-12-23 RX ADMIN — CLOPIDOGREL 75 MG: 75 TABLET, FILM COATED ORAL at 09:12

## 2020-12-23 RX ADMIN — METHOCARBAMOL TABLETS 750 MG: 750 TABLET, COATED ORAL at 08:12

## 2020-12-23 RX ADMIN — METHOCARBAMOL TABLETS 750 MG: 750 TABLET, COATED ORAL at 09:12

## 2020-12-23 RX ADMIN — DULOXETINE HYDROCHLORIDE 60 MG: 60 CAPSULE, DELAYED RELEASE ORAL at 09:12

## 2020-12-24 LAB — VANCOMYCIN SERPL-MCNC: 19.2 UG/ML

## 2020-12-24 PROCEDURE — 94761 N-INVAS EAR/PLS OXIMETRY MLT: CPT

## 2020-12-24 PROCEDURE — 97110 THERAPEUTIC EXERCISES: CPT

## 2020-12-24 PROCEDURE — 80202 ASSAY OF VANCOMYCIN: CPT

## 2020-12-24 PROCEDURE — 99900031 HC PATIENT EDUCATION (STAT)

## 2020-12-24 PROCEDURE — 99900035 HC TECH TIME PER 15 MIN (STAT)

## 2020-12-24 PROCEDURE — 97530 THERAPEUTIC ACTIVITIES: CPT

## 2020-12-24 PROCEDURE — 97535 SELF CARE MNGMENT TRAINING: CPT

## 2020-12-24 PROCEDURE — 36415 COLL VENOUS BLD VENIPUNCTURE: CPT

## 2020-12-24 PROCEDURE — 11000001 HC ACUTE MED/SURG PRIVATE ROOM

## 2020-12-24 PROCEDURE — 25000003 PHARM REV CODE 250: Performed by: NURSE PRACTITIONER

## 2020-12-24 PROCEDURE — 63600175 PHARM REV CODE 636 W HCPCS: Performed by: INTERNAL MEDICINE

## 2020-12-24 PROCEDURE — 25000003 PHARM REV CODE 250: Performed by: INTERNAL MEDICINE

## 2020-12-24 PROCEDURE — 94799 UNLISTED PULMONARY SVC/PX: CPT

## 2020-12-24 RX ADMIN — MEMANTINE 10 MG: 10 TABLET ORAL at 08:12

## 2020-12-24 RX ADMIN — OXYCODONE HYDROCHLORIDE 10 MG: 10 TABLET ORAL at 08:12

## 2020-12-24 RX ADMIN — BACITRACIN ZINC: 500 OINTMENT TOPICAL at 08:12

## 2020-12-24 RX ADMIN — ENOXAPARIN SODIUM 40 MG: 40 INJECTION SUBCUTANEOUS at 04:12

## 2020-12-24 RX ADMIN — FERROUS SULFATE TAB 325 MG (65 MG ELEMENTAL FE) 325 MG: 325 (65 FE) TAB at 08:12

## 2020-12-24 RX ADMIN — PANTOPRAZOLE SODIUM 40 MG: 40 TABLET, DELAYED RELEASE ORAL at 05:12

## 2020-12-24 RX ADMIN — PRAVASTATIN SODIUM 40 MG: 40 TABLET ORAL at 08:12

## 2020-12-24 RX ADMIN — DONEPEZIL HYDROCHLORIDE 10 MG: 5 TABLET ORAL at 08:12

## 2020-12-24 RX ADMIN — ASPIRIN 81 MG: 81 TABLET, COATED ORAL at 08:12

## 2020-12-24 RX ADMIN — FUROSEMIDE 40 MG: 40 TABLET ORAL at 08:12

## 2020-12-24 RX ADMIN — VANCOMYCIN HYDROCHLORIDE 1250 MG: 1.25 INJECTION, POWDER, LYOPHILIZED, FOR SOLUTION INTRAVENOUS at 10:12

## 2020-12-24 RX ADMIN — CLOPIDOGREL 75 MG: 75 TABLET, FILM COATED ORAL at 08:12

## 2020-12-24 RX ADMIN — METHOCARBAMOL TABLETS 750 MG: 750 TABLET, COATED ORAL at 08:12

## 2020-12-24 RX ADMIN — ISOSORBIDE MONONITRATE 60 MG: 30 TABLET, EXTENDED RELEASE ORAL at 08:12

## 2020-12-24 RX ADMIN — METHOCARBAMOL TABLETS 750 MG: 750 TABLET, COATED ORAL at 12:12

## 2020-12-24 RX ADMIN — ACETAMINOPHEN 650 MG: 325 TABLET ORAL at 12:12

## 2020-12-24 RX ADMIN — DULOXETINE HYDROCHLORIDE 60 MG: 60 CAPSULE, DELAYED RELEASE ORAL at 08:12

## 2020-12-25 LAB
ALBUMIN SERPL BCP-MCNC: 1.9 G/DL (ref 3.5–5.2)
ALP SERPL-CCNC: 117 U/L (ref 55–135)
ALT SERPL W/O P-5'-P-CCNC: 10 U/L (ref 10–44)
ANION GAP SERPL CALC-SCNC: 6 MMOL/L (ref 8–16)
AST SERPL-CCNC: 16 U/L (ref 10–40)
BASOPHILS # BLD AUTO: 0.02 K/UL (ref 0–0.2)
BASOPHILS NFR BLD: 0.4 % (ref 0–1.9)
BILIRUB SERPL-MCNC: 0.2 MG/DL (ref 0.1–1)
BUN SERPL-MCNC: 7 MG/DL (ref 8–23)
CALCIUM SERPL-MCNC: 8.9 MG/DL (ref 8.7–10.5)
CHLORIDE SERPL-SCNC: 101 MMOL/L (ref 95–110)
CO2 SERPL-SCNC: 32 MMOL/L (ref 23–29)
CREAT SERPL-MCNC: 1.1 MG/DL (ref 0.5–1.4)
DIFFERENTIAL METHOD: ABNORMAL
EOSINOPHIL # BLD AUTO: 0 K/UL (ref 0–0.5)
EOSINOPHIL NFR BLD: 0.2 % (ref 0–8)
ERYTHROCYTE [DISTWIDTH] IN BLOOD BY AUTOMATED COUNT: 16.8 % (ref 11.5–14.5)
EST. GFR  (AFRICAN AMERICAN): 58 ML/MIN/1.73 M^2
EST. GFR  (NON AFRICAN AMERICAN): 50.3 ML/MIN/1.73 M^2
GLUCOSE SERPL-MCNC: 67 MG/DL (ref 70–110)
HCT VFR BLD AUTO: 30.8 % (ref 37–48.5)
HGB BLD-MCNC: 9.5 G/DL (ref 12–16)
IMM GRANULOCYTES # BLD AUTO: 0.01 K/UL (ref 0–0.04)
IMM GRANULOCYTES NFR BLD AUTO: 0.2 % (ref 0–0.5)
LYMPHOCYTES # BLD AUTO: 1.5 K/UL (ref 1–4.8)
LYMPHOCYTES NFR BLD: 30.7 % (ref 18–48)
MAGNESIUM SERPL-MCNC: 2 MG/DL (ref 1.6–2.6)
MCH RBC QN AUTO: 28.2 PG (ref 27–31)
MCHC RBC AUTO-ENTMCNC: 30.8 G/DL (ref 32–36)
MCV RBC AUTO: 91 FL (ref 82–98)
MONOCYTES # BLD AUTO: 1.1 K/UL (ref 0.3–1)
MONOCYTES NFR BLD: 22.9 % (ref 4–15)
NEUTROPHILS # BLD AUTO: 2.3 K/UL (ref 1.8–7.7)
NEUTROPHILS NFR BLD: 45.6 % (ref 38–73)
NRBC BLD-RTO: 0 /100 WBC
PLATELET # BLD AUTO: 371 K/UL (ref 150–350)
PMV BLD AUTO: 9.5 FL (ref 9.2–12.9)
POTASSIUM SERPL-SCNC: 2.9 MMOL/L (ref 3.5–5.1)
PROT SERPL-MCNC: 5.8 G/DL (ref 6–8.4)
RBC # BLD AUTO: 3.37 M/UL (ref 4–5.4)
SODIUM SERPL-SCNC: 139 MMOL/L (ref 136–145)
WBC # BLD AUTO: 4.98 K/UL (ref 3.9–12.7)

## 2020-12-25 PROCEDURE — 94799 UNLISTED PULMONARY SVC/PX: CPT

## 2020-12-25 PROCEDURE — 80053 COMPREHEN METABOLIC PANEL: CPT

## 2020-12-25 PROCEDURE — 99900035 HC TECH TIME PER 15 MIN (STAT)

## 2020-12-25 PROCEDURE — 99900031 HC PATIENT EDUCATION (STAT)

## 2020-12-25 PROCEDURE — 85025 COMPLETE CBC W/AUTO DIFF WBC: CPT

## 2020-12-25 PROCEDURE — 63600175 PHARM REV CODE 636 W HCPCS: Performed by: INTERNAL MEDICINE

## 2020-12-25 PROCEDURE — 25000003 PHARM REV CODE 250: Performed by: INTERNAL MEDICINE

## 2020-12-25 PROCEDURE — 83735 ASSAY OF MAGNESIUM: CPT

## 2020-12-25 PROCEDURE — 94760 N-INVAS EAR/PLS OXIMETRY 1: CPT

## 2020-12-25 PROCEDURE — 94761 N-INVAS EAR/PLS OXIMETRY MLT: CPT

## 2020-12-25 PROCEDURE — 11000001 HC ACUTE MED/SURG PRIVATE ROOM

## 2020-12-25 RX ORDER — BACITRACIN ZINC 500 [USP'U]/G
OINTMENT TOPICAL 2 TIMES DAILY PRN
Status: DISCONTINUED | OUTPATIENT
Start: 2020-12-25 | End: 2021-01-08 | Stop reason: HOSPADM

## 2020-12-25 RX ADMIN — METHOCARBAMOL TABLETS 750 MG: 750 TABLET, COATED ORAL at 04:12

## 2020-12-25 RX ADMIN — OXYCODONE HYDROCHLORIDE 10 MG: 10 TABLET ORAL at 08:12

## 2020-12-25 RX ADMIN — MEMANTINE 10 MG: 10 TABLET ORAL at 09:12

## 2020-12-25 RX ADMIN — METHOCARBAMOL TABLETS 750 MG: 750 TABLET, COATED ORAL at 12:12

## 2020-12-25 RX ADMIN — SENNOSIDES AND DOCUSATE SODIUM 2 TABLET: 8.6; 5 TABLET ORAL at 12:12

## 2020-12-25 RX ADMIN — FERROUS SULFATE TAB 325 MG (65 MG ELEMENTAL FE) 325 MG: 325 (65 FE) TAB at 08:12

## 2020-12-25 RX ADMIN — PANTOPRAZOLE SODIUM 40 MG: 40 TABLET, DELAYED RELEASE ORAL at 05:12

## 2020-12-25 RX ADMIN — PRAVASTATIN SODIUM 40 MG: 40 TABLET ORAL at 08:12

## 2020-12-25 RX ADMIN — ASPIRIN 81 MG: 81 TABLET, COATED ORAL at 09:12

## 2020-12-25 RX ADMIN — METHOCARBAMOL TABLETS 750 MG: 750 TABLET, COATED ORAL at 09:12

## 2020-12-25 RX ADMIN — DULOXETINE HYDROCHLORIDE 60 MG: 60 CAPSULE, DELAYED RELEASE ORAL at 09:12

## 2020-12-25 RX ADMIN — FUROSEMIDE 40 MG: 40 TABLET ORAL at 09:12

## 2020-12-25 RX ADMIN — ENOXAPARIN SODIUM 40 MG: 40 INJECTION SUBCUTANEOUS at 04:12

## 2020-12-25 RX ADMIN — ISOSORBIDE MONONITRATE 60 MG: 30 TABLET, EXTENDED RELEASE ORAL at 09:12

## 2020-12-25 RX ADMIN — CLOPIDOGREL 75 MG: 75 TABLET, FILM COATED ORAL at 09:12

## 2020-12-25 RX ADMIN — MEMANTINE 10 MG: 10 TABLET ORAL at 08:12

## 2020-12-25 RX ADMIN — DONEPEZIL HYDROCHLORIDE 10 MG: 5 TABLET ORAL at 08:12

## 2020-12-25 RX ADMIN — METHOCARBAMOL TABLETS 750 MG: 750 TABLET, COATED ORAL at 08:12

## 2020-12-25 RX ADMIN — VANCOMYCIN HYDROCHLORIDE 1250 MG: 1.25 INJECTION, POWDER, LYOPHILIZED, FOR SOLUTION INTRAVENOUS at 09:12

## 2020-12-25 RX ADMIN — ALUMINUM HYDROXIDE, MAGNESIUM HYDROXIDE, AND SIMETHICONE 30 ML: 200; 200; 20 SUSPENSION ORAL at 10:12

## 2020-12-26 LAB — VANCOMYCIN TROUGH SERPL-MCNC: 19.3 UG/ML (ref 10–22)

## 2020-12-26 PROCEDURE — 97530 THERAPEUTIC ACTIVITIES: CPT

## 2020-12-26 PROCEDURE — 94799 UNLISTED PULMONARY SVC/PX: CPT

## 2020-12-26 PROCEDURE — 94761 N-INVAS EAR/PLS OXIMETRY MLT: CPT

## 2020-12-26 PROCEDURE — 97110 THERAPEUTIC EXERCISES: CPT

## 2020-12-26 PROCEDURE — 63600175 PHARM REV CODE 636 W HCPCS: Performed by: INTERNAL MEDICINE

## 2020-12-26 PROCEDURE — 97535 SELF CARE MNGMENT TRAINING: CPT

## 2020-12-26 PROCEDURE — 11000001 HC ACUTE MED/SURG PRIVATE ROOM

## 2020-12-26 PROCEDURE — 99900031 HC PATIENT EDUCATION (STAT)

## 2020-12-26 PROCEDURE — 25000003 PHARM REV CODE 250: Performed by: INTERNAL MEDICINE

## 2020-12-26 PROCEDURE — 94760 N-INVAS EAR/PLS OXIMETRY 1: CPT

## 2020-12-26 PROCEDURE — 99900035 HC TECH TIME PER 15 MIN (STAT)

## 2020-12-26 PROCEDURE — 36415 COLL VENOUS BLD VENIPUNCTURE: CPT

## 2020-12-26 PROCEDURE — 80202 ASSAY OF VANCOMYCIN: CPT

## 2020-12-26 RX ORDER — VANCOMYCIN HCL IN 5 % DEXTROSE 1G/250ML
1000 PLASTIC BAG, INJECTION (ML) INTRAVENOUS
Status: DISCONTINUED | OUTPATIENT
Start: 2020-12-26 | End: 2021-01-08

## 2020-12-26 RX ADMIN — CLOPIDOGREL 75 MG: 75 TABLET, FILM COATED ORAL at 09:12

## 2020-12-26 RX ADMIN — BACITRACIN ZINC: 500 OINTMENT TOPICAL at 08:12

## 2020-12-26 RX ADMIN — METHOCARBAMOL TABLETS 750 MG: 750 TABLET, COATED ORAL at 04:12

## 2020-12-26 RX ADMIN — METHOCARBAMOL TABLETS 750 MG: 750 TABLET, COATED ORAL at 09:12

## 2020-12-26 RX ADMIN — OXYCODONE HYDROCHLORIDE 10 MG: 10 TABLET ORAL at 09:12

## 2020-12-26 RX ADMIN — OXYCODONE HYDROCHLORIDE 10 MG: 10 TABLET ORAL at 08:12

## 2020-12-26 RX ADMIN — DULOXETINE HYDROCHLORIDE 60 MG: 60 CAPSULE, DELAYED RELEASE ORAL at 09:12

## 2020-12-26 RX ADMIN — FUROSEMIDE 40 MG: 40 TABLET ORAL at 09:12

## 2020-12-26 RX ADMIN — MEMANTINE 10 MG: 10 TABLET ORAL at 09:12

## 2020-12-26 RX ADMIN — METHOCARBAMOL TABLETS 750 MG: 750 TABLET, COATED ORAL at 12:12

## 2020-12-26 RX ADMIN — ASPIRIN 81 MG: 81 TABLET, COATED ORAL at 09:12

## 2020-12-26 RX ADMIN — ISOSORBIDE MONONITRATE 60 MG: 30 TABLET, EXTENDED RELEASE ORAL at 09:12

## 2020-12-26 RX ADMIN — FERROUS SULFATE TAB 325 MG (65 MG ELEMENTAL FE) 325 MG: 325 (65 FE) TAB at 08:12

## 2020-12-26 RX ADMIN — PANTOPRAZOLE SODIUM 40 MG: 40 TABLET, DELAYED RELEASE ORAL at 05:12

## 2020-12-26 RX ADMIN — MEMANTINE 10 MG: 10 TABLET ORAL at 08:12

## 2020-12-26 RX ADMIN — POTASSIUM BICARBONATE 40 MEQ: 391 TABLET, EFFERVESCENT ORAL at 10:12

## 2020-12-26 RX ADMIN — ENOXAPARIN SODIUM 40 MG: 40 INJECTION SUBCUTANEOUS at 04:12

## 2020-12-26 RX ADMIN — VANCOMYCIN HYDROCHLORIDE 1000 MG: 1 INJECTION, POWDER, LYOPHILIZED, FOR SOLUTION INTRAVENOUS at 10:12

## 2020-12-26 RX ADMIN — ACETAMINOPHEN 650 MG: 325 TABLET ORAL at 12:12

## 2020-12-26 RX ADMIN — PRAVASTATIN SODIUM 40 MG: 40 TABLET ORAL at 08:12

## 2020-12-26 RX ADMIN — METHOCARBAMOL TABLETS 750 MG: 750 TABLET, COATED ORAL at 08:12

## 2020-12-26 RX ADMIN — DONEPEZIL HYDROCHLORIDE 10 MG: 5 TABLET ORAL at 08:12

## 2020-12-27 LAB
ALBUMIN SERPL BCP-MCNC: 1.9 G/DL (ref 3.5–5.2)
ALP SERPL-CCNC: 115 U/L (ref 55–135)
ALT SERPL W/O P-5'-P-CCNC: 12 U/L (ref 10–44)
ANION GAP SERPL CALC-SCNC: 5 MMOL/L (ref 8–16)
AST SERPL-CCNC: 19 U/L (ref 10–40)
BILIRUB SERPL-MCNC: 0.2 MG/DL (ref 0.1–1)
BUN SERPL-MCNC: 8 MG/DL (ref 8–23)
CALCIUM SERPL-MCNC: 8.7 MG/DL (ref 8.7–10.5)
CHLORIDE SERPL-SCNC: 100 MMOL/L (ref 95–110)
CO2 SERPL-SCNC: 33 MMOL/L (ref 23–29)
CREAT SERPL-MCNC: 0.9 MG/DL (ref 0.5–1.4)
EST. GFR  (AFRICAN AMERICAN): >60 ML/MIN/1.73 M^2
EST. GFR  (NON AFRICAN AMERICAN): >60 ML/MIN/1.73 M^2
GLUCOSE SERPL-MCNC: 87 MG/DL (ref 70–110)
MAGNESIUM SERPL-MCNC: 2.1 MG/DL (ref 1.6–2.6)
POTASSIUM SERPL-SCNC: 3.1 MMOL/L (ref 3.5–5.1)
PROT SERPL-MCNC: 5.6 G/DL (ref 6–8.4)
SODIUM SERPL-SCNC: 138 MMOL/L (ref 136–145)

## 2020-12-27 PROCEDURE — 94799 UNLISTED PULMONARY SVC/PX: CPT

## 2020-12-27 PROCEDURE — 83735 ASSAY OF MAGNESIUM: CPT

## 2020-12-27 PROCEDURE — 63600175 PHARM REV CODE 636 W HCPCS: Performed by: INTERNAL MEDICINE

## 2020-12-27 PROCEDURE — 99900035 HC TECH TIME PER 15 MIN (STAT)

## 2020-12-27 PROCEDURE — 94761 N-INVAS EAR/PLS OXIMETRY MLT: CPT

## 2020-12-27 PROCEDURE — 25000003 PHARM REV CODE 250: Performed by: INTERNAL MEDICINE

## 2020-12-27 PROCEDURE — 11000001 HC ACUTE MED/SURG PRIVATE ROOM

## 2020-12-27 PROCEDURE — 99900031 HC PATIENT EDUCATION (STAT)

## 2020-12-27 PROCEDURE — 80053 COMPREHEN METABOLIC PANEL: CPT

## 2020-12-27 PROCEDURE — 36415 COLL VENOUS BLD VENIPUNCTURE: CPT

## 2020-12-27 PROCEDURE — 94760 N-INVAS EAR/PLS OXIMETRY 1: CPT

## 2020-12-27 RX ADMIN — METHOCARBAMOL TABLETS 750 MG: 750 TABLET, COATED ORAL at 04:12

## 2020-12-27 RX ADMIN — FUROSEMIDE 40 MG: 40 TABLET ORAL at 08:12

## 2020-12-27 RX ADMIN — MEMANTINE 10 MG: 10 TABLET ORAL at 08:12

## 2020-12-27 RX ADMIN — VANCOMYCIN HYDROCHLORIDE 1000 MG: 1 INJECTION, POWDER, LYOPHILIZED, FOR SOLUTION INTRAVENOUS at 09:12

## 2020-12-27 RX ADMIN — METHOCARBAMOL TABLETS 750 MG: 750 TABLET, COATED ORAL at 01:12

## 2020-12-27 RX ADMIN — DULOXETINE HYDROCHLORIDE 60 MG: 60 CAPSULE, DELAYED RELEASE ORAL at 08:12

## 2020-12-27 RX ADMIN — OXYCODONE HYDROCHLORIDE 10 MG: 10 TABLET ORAL at 05:12

## 2020-12-27 RX ADMIN — ENOXAPARIN SODIUM 40 MG: 40 INJECTION SUBCUTANEOUS at 04:12

## 2020-12-27 RX ADMIN — CLOPIDOGREL 75 MG: 75 TABLET, FILM COATED ORAL at 08:12

## 2020-12-27 RX ADMIN — OXYCODONE HYDROCHLORIDE 10 MG: 10 TABLET ORAL at 08:12

## 2020-12-27 RX ADMIN — METHOCARBAMOL TABLETS 750 MG: 750 TABLET, COATED ORAL at 08:12

## 2020-12-27 RX ADMIN — ASPIRIN 81 MG: 81 TABLET, COATED ORAL at 08:12

## 2020-12-27 RX ADMIN — ACETAMINOPHEN 650 MG: 325 TABLET ORAL at 03:12

## 2020-12-27 RX ADMIN — PRAVASTATIN SODIUM 40 MG: 40 TABLET ORAL at 08:12

## 2020-12-27 RX ADMIN — FERROUS SULFATE TAB 325 MG (65 MG ELEMENTAL FE) 325 MG: 325 (65 FE) TAB at 08:12

## 2020-12-27 RX ADMIN — DONEPEZIL HYDROCHLORIDE 10 MG: 5 TABLET ORAL at 08:12

## 2020-12-27 RX ADMIN — PANTOPRAZOLE SODIUM 40 MG: 40 TABLET, DELAYED RELEASE ORAL at 05:12

## 2020-12-27 RX ADMIN — ISOSORBIDE MONONITRATE 60 MG: 30 TABLET, EXTENDED RELEASE ORAL at 08:12

## 2020-12-28 LAB — VANCOMYCIN TROUGH SERPL-MCNC: 15.9 UG/ML (ref 10–22)

## 2020-12-28 PROCEDURE — 25000003 PHARM REV CODE 250: Performed by: NURSE PRACTITIONER

## 2020-12-28 PROCEDURE — 63600175 PHARM REV CODE 636 W HCPCS: Performed by: INTERNAL MEDICINE

## 2020-12-28 PROCEDURE — 94799 UNLISTED PULMONARY SVC/PX: CPT

## 2020-12-28 PROCEDURE — 25000003 PHARM REV CODE 250: Performed by: INTERNAL MEDICINE

## 2020-12-28 PROCEDURE — 36415 COLL VENOUS BLD VENIPUNCTURE: CPT

## 2020-12-28 PROCEDURE — 99900035 HC TECH TIME PER 15 MIN (STAT)

## 2020-12-28 PROCEDURE — 97530 THERAPEUTIC ACTIVITIES: CPT

## 2020-12-28 PROCEDURE — 94760 N-INVAS EAR/PLS OXIMETRY 1: CPT

## 2020-12-28 PROCEDURE — 97535 SELF CARE MNGMENT TRAINING: CPT

## 2020-12-28 PROCEDURE — 99900031 HC PATIENT EDUCATION (STAT)

## 2020-12-28 PROCEDURE — 11000001 HC ACUTE MED/SURG PRIVATE ROOM

## 2020-12-28 PROCEDURE — 80202 ASSAY OF VANCOMYCIN: CPT

## 2020-12-28 PROCEDURE — 97112 NEUROMUSCULAR REEDUCATION: CPT

## 2020-12-28 RX ORDER — POTASSIUM CHLORIDE 20 MEQ/1
40 TABLET, EXTENDED RELEASE ORAL DAILY
Status: DISCONTINUED | OUTPATIENT
Start: 2020-12-28 | End: 2021-01-08 | Stop reason: HOSPADM

## 2020-12-28 RX ADMIN — ASPIRIN 81 MG: 81 TABLET, COATED ORAL at 08:12

## 2020-12-28 RX ADMIN — DULOXETINE HYDROCHLORIDE 60 MG: 60 CAPSULE, DELAYED RELEASE ORAL at 08:12

## 2020-12-28 RX ADMIN — MEMANTINE 10 MG: 10 TABLET ORAL at 08:12

## 2020-12-28 RX ADMIN — PRAVASTATIN SODIUM 40 MG: 40 TABLET ORAL at 08:12

## 2020-12-28 RX ADMIN — OXYCODONE HYDROCHLORIDE 10 MG: 10 TABLET ORAL at 10:12

## 2020-12-28 RX ADMIN — PANTOPRAZOLE SODIUM 40 MG: 40 TABLET, DELAYED RELEASE ORAL at 05:12

## 2020-12-28 RX ADMIN — FUROSEMIDE 40 MG: 40 TABLET ORAL at 08:12

## 2020-12-28 RX ADMIN — ENOXAPARIN SODIUM 40 MG: 40 INJECTION SUBCUTANEOUS at 04:12

## 2020-12-28 RX ADMIN — POTASSIUM CHLORIDE 40 MEQ: 1500 TABLET, EXTENDED RELEASE ORAL at 12:12

## 2020-12-28 RX ADMIN — METHOCARBAMOL TABLETS 750 MG: 750 TABLET, COATED ORAL at 08:12

## 2020-12-28 RX ADMIN — ISOSORBIDE MONONITRATE 60 MG: 30 TABLET, EXTENDED RELEASE ORAL at 08:12

## 2020-12-28 RX ADMIN — ONDANSETRON 4 MG: 4 TABLET, ORALLY DISINTEGRATING ORAL at 11:12

## 2020-12-28 RX ADMIN — DONEPEZIL HYDROCHLORIDE 10 MG: 5 TABLET ORAL at 08:12

## 2020-12-28 RX ADMIN — VANCOMYCIN HYDROCHLORIDE 1000 MG: 1 INJECTION, POWDER, LYOPHILIZED, FOR SOLUTION INTRAVENOUS at 10:12

## 2020-12-28 RX ADMIN — FERROUS SULFATE TAB 325 MG (65 MG ELEMENTAL FE) 325 MG: 325 (65 FE) TAB at 08:12

## 2020-12-28 RX ADMIN — METHOCARBAMOL TABLETS 750 MG: 750 TABLET, COATED ORAL at 04:12

## 2020-12-28 RX ADMIN — OXYCODONE HYDROCHLORIDE 10 MG: 10 TABLET ORAL at 08:12

## 2020-12-28 RX ADMIN — CLOPIDOGREL 75 MG: 75 TABLET, FILM COATED ORAL at 08:12

## 2020-12-28 RX ADMIN — METHOCARBAMOL TABLETS 750 MG: 750 TABLET, COATED ORAL at 12:12

## 2020-12-29 LAB
ALBUMIN SERPL BCP-MCNC: 2 G/DL (ref 3.5–5.2)
ALP SERPL-CCNC: 114 U/L (ref 55–135)
ALT SERPL W/O P-5'-P-CCNC: 14 U/L (ref 10–44)
ANION GAP SERPL CALC-SCNC: 7 MMOL/L (ref 8–16)
AST SERPL-CCNC: 23 U/L (ref 10–40)
BASOPHILS # BLD AUTO: ABNORMAL K/UL (ref 0–0.2)
BASOPHILS NFR BLD: 0 % (ref 0–1.9)
BILIRUB SERPL-MCNC: 0.2 MG/DL (ref 0.1–1)
BUN SERPL-MCNC: 6 MG/DL (ref 8–23)
CALCIUM SERPL-MCNC: 8.7 MG/DL (ref 8.7–10.5)
CHLORIDE SERPL-SCNC: 98 MMOL/L (ref 95–110)
CO2 SERPL-SCNC: 32 MMOL/L (ref 23–29)
CREAT SERPL-MCNC: 1 MG/DL (ref 0.5–1.4)
DIFFERENTIAL METHOD: ABNORMAL
EOSINOPHIL # BLD AUTO: ABNORMAL K/UL (ref 0–0.5)
EOSINOPHIL NFR BLD: 0 % (ref 0–8)
ERYTHROCYTE [DISTWIDTH] IN BLOOD BY AUTOMATED COUNT: 16.1 % (ref 11.5–14.5)
EST. GFR  (AFRICAN AMERICAN): >60 ML/MIN/1.73 M^2
EST. GFR  (NON AFRICAN AMERICAN): 56.4 ML/MIN/1.73 M^2
GLUCOSE SERPL-MCNC: 81 MG/DL (ref 70–110)
HCT VFR BLD AUTO: 34.1 % (ref 37–48.5)
HGB BLD-MCNC: 10.2 G/DL (ref 12–16)
IMM GRANULOCYTES # BLD AUTO: ABNORMAL K/UL (ref 0–0.04)
IMM GRANULOCYTES NFR BLD AUTO: ABNORMAL % (ref 0–0.5)
LYMPHOCYTES # BLD AUTO: ABNORMAL K/UL (ref 1–4.8)
LYMPHOCYTES NFR BLD: 46 % (ref 18–48)
MAGNESIUM SERPL-MCNC: 2.1 MG/DL (ref 1.6–2.6)
MCH RBC QN AUTO: 27.6 PG (ref 27–31)
MCHC RBC AUTO-ENTMCNC: 29.9 G/DL (ref 32–36)
MCV RBC AUTO: 92 FL (ref 82–98)
MONOCYTES # BLD AUTO: ABNORMAL K/UL (ref 0.3–1)
MONOCYTES NFR BLD: 28 % (ref 4–15)
NEUTROPHILS NFR BLD: 20 % (ref 38–73)
NEUTS BAND NFR BLD MANUAL: 6 %
NRBC BLD-RTO: 0 /100 WBC
PLATELET # BLD AUTO: 300 K/UL (ref 150–350)
PMV BLD AUTO: 9.6 FL (ref 9.2–12.9)
POTASSIUM SERPL-SCNC: 3.3 MMOL/L (ref 3.5–5.1)
PROT SERPL-MCNC: 5.9 G/DL (ref 6–8.4)
RBC # BLD AUTO: 3.69 M/UL (ref 4–5.4)
SODIUM SERPL-SCNC: 137 MMOL/L (ref 136–145)
WBC # BLD AUTO: 2.98 K/UL (ref 3.9–12.7)

## 2020-12-29 PROCEDURE — 25000003 PHARM REV CODE 250: Performed by: INTERNAL MEDICINE

## 2020-12-29 PROCEDURE — 11000001 HC ACUTE MED/SURG PRIVATE ROOM

## 2020-12-29 PROCEDURE — 30200315 PPD INTRADERMAL TEST REV CODE 302: Performed by: INTERNAL MEDICINE

## 2020-12-29 PROCEDURE — 85027 COMPLETE CBC AUTOMATED: CPT

## 2020-12-29 PROCEDURE — 97110 THERAPEUTIC EXERCISES: CPT

## 2020-12-29 PROCEDURE — 97535 SELF CARE MNGMENT TRAINING: CPT

## 2020-12-29 PROCEDURE — 94799 UNLISTED PULMONARY SVC/PX: CPT

## 2020-12-29 PROCEDURE — 80053 COMPREHEN METABOLIC PANEL: CPT

## 2020-12-29 PROCEDURE — 99900031 HC PATIENT EDUCATION (STAT)

## 2020-12-29 PROCEDURE — 97530 THERAPEUTIC ACTIVITIES: CPT

## 2020-12-29 PROCEDURE — 85007 BL SMEAR W/DIFF WBC COUNT: CPT

## 2020-12-29 PROCEDURE — 83735 ASSAY OF MAGNESIUM: CPT

## 2020-12-29 PROCEDURE — 99900035 HC TECH TIME PER 15 MIN (STAT)

## 2020-12-29 PROCEDURE — 36415 COLL VENOUS BLD VENIPUNCTURE: CPT

## 2020-12-29 PROCEDURE — 25000003 PHARM REV CODE 250: Performed by: NURSE PRACTITIONER

## 2020-12-29 PROCEDURE — 94761 N-INVAS EAR/PLS OXIMETRY MLT: CPT

## 2020-12-29 PROCEDURE — 86580 TB INTRADERMAL TEST: CPT | Performed by: INTERNAL MEDICINE

## 2020-12-29 PROCEDURE — 63600175 PHARM REV CODE 636 W HCPCS: Performed by: INTERNAL MEDICINE

## 2020-12-29 RX ORDER — METHOCARBAMOL 750 MG/1
750 TABLET, FILM COATED ORAL 3 TIMES DAILY PRN
Status: DISCONTINUED | OUTPATIENT
Start: 2020-12-29 | End: 2021-01-08 | Stop reason: HOSPADM

## 2020-12-29 RX ADMIN — METHOCARBAMOL TABLETS 750 MG: 750 TABLET, COATED ORAL at 12:12

## 2020-12-29 RX ADMIN — VANCOMYCIN HYDROCHLORIDE 1000 MG: 1 INJECTION, POWDER, LYOPHILIZED, FOR SOLUTION INTRAVENOUS at 09:12

## 2020-12-29 RX ADMIN — FUROSEMIDE 40 MG: 40 TABLET ORAL at 09:12

## 2020-12-29 RX ADMIN — OXYCODONE HYDROCHLORIDE 10 MG: 10 TABLET ORAL at 09:12

## 2020-12-29 RX ADMIN — PANTOPRAZOLE SODIUM 40 MG: 40 TABLET, DELAYED RELEASE ORAL at 05:12

## 2020-12-29 RX ADMIN — ISOSORBIDE MONONITRATE 60 MG: 30 TABLET, EXTENDED RELEASE ORAL at 09:12

## 2020-12-29 RX ADMIN — POTASSIUM CHLORIDE 40 MEQ: 1500 TABLET, EXTENDED RELEASE ORAL at 09:12

## 2020-12-29 RX ADMIN — FERROUS SULFATE TAB 325 MG (65 MG ELEMENTAL FE) 325 MG: 325 (65 FE) TAB at 09:12

## 2020-12-29 RX ADMIN — ASPIRIN 81 MG: 81 TABLET, COATED ORAL at 09:12

## 2020-12-29 RX ADMIN — MEMANTINE 10 MG: 10 TABLET ORAL at 09:12

## 2020-12-29 RX ADMIN — TUBERCULIN PURIFIED PROTEIN DERIVATIVE 5 UNITS: 5 INJECTION INTRADERMAL at 03:12

## 2020-12-29 RX ADMIN — METHOCARBAMOL TABLETS 750 MG: 750 TABLET, COATED ORAL at 04:12

## 2020-12-29 RX ADMIN — CLOPIDOGREL 75 MG: 75 TABLET, FILM COATED ORAL at 09:12

## 2020-12-29 RX ADMIN — ENOXAPARIN SODIUM 40 MG: 40 INJECTION SUBCUTANEOUS at 04:12

## 2020-12-29 RX ADMIN — PRAVASTATIN SODIUM 40 MG: 40 TABLET ORAL at 09:12

## 2020-12-29 RX ADMIN — DONEPEZIL HYDROCHLORIDE 10 MG: 5 TABLET ORAL at 09:12

## 2020-12-29 RX ADMIN — DULOXETINE HYDROCHLORIDE 60 MG: 60 CAPSULE, DELAYED RELEASE ORAL at 09:12

## 2020-12-30 LAB
BACTERIA #/AREA URNS HPF: NEGATIVE /HPF
BILIRUB UR QL STRIP: NEGATIVE
CLARITY UR: CLEAR
COLOR UR: YELLOW
GLUCOSE UR QL STRIP: NEGATIVE
HGB UR QL STRIP: NEGATIVE
HYALINE CASTS #/AREA URNS LPF: 1 /LPF
KETONES UR QL STRIP: NEGATIVE
LEUKOCYTE ESTERASE UR QL STRIP: ABNORMAL
MICROSCOPIC COMMENT: NORMAL
NITRITE UR QL STRIP: NEGATIVE
PH UR STRIP: 7 [PH] (ref 5–8)
PROT UR QL STRIP: NEGATIVE
RBC #/AREA URNS HPF: 0 /HPF (ref 0–4)
SP GR UR STRIP: <=1.005 (ref 1–1.03)
SQUAMOUS #/AREA URNS HPF: 0 /HPF
URN SPEC COLLECT METH UR: ABNORMAL
UROBILINOGEN UR STRIP-ACNC: NEGATIVE EU/DL
WBC #/AREA URNS HPF: 2 /HPF (ref 0–5)

## 2020-12-30 PROCEDURE — 25000003 PHARM REV CODE 250: Performed by: INTERNAL MEDICINE

## 2020-12-30 PROCEDURE — 97110 THERAPEUTIC EXERCISES: CPT

## 2020-12-30 PROCEDURE — 81000 URINALYSIS NONAUTO W/SCOPE: CPT

## 2020-12-30 PROCEDURE — 11000001 HC ACUTE MED/SURG PRIVATE ROOM

## 2020-12-30 PROCEDURE — 97530 THERAPEUTIC ACTIVITIES: CPT

## 2020-12-30 PROCEDURE — 94760 N-INVAS EAR/PLS OXIMETRY 1: CPT

## 2020-12-30 PROCEDURE — 25000003 PHARM REV CODE 250: Performed by: NURSE PRACTITIONER

## 2020-12-30 PROCEDURE — 97535 SELF CARE MNGMENT TRAINING: CPT

## 2020-12-30 PROCEDURE — 63600175 PHARM REV CODE 636 W HCPCS: Performed by: INTERNAL MEDICINE

## 2020-12-30 PROCEDURE — 99900035 HC TECH TIME PER 15 MIN (STAT)

## 2020-12-30 PROCEDURE — 94799 UNLISTED PULMONARY SVC/PX: CPT

## 2020-12-30 RX ORDER — MICONAZOLE NITRATE 2 %
POWDER (GRAM) TOPICAL 2 TIMES DAILY
Status: DISCONTINUED | OUTPATIENT
Start: 2020-12-30 | End: 2021-01-08 | Stop reason: HOSPADM

## 2020-12-30 RX ORDER — LOPERAMIDE HYDROCHLORIDE 2 MG/1
4 CAPSULE ORAL 2 TIMES DAILY PRN
Status: DISCONTINUED | OUTPATIENT
Start: 2020-12-30 | End: 2021-01-08 | Stop reason: HOSPADM

## 2020-12-30 RX ADMIN — DONEPEZIL HYDROCHLORIDE 10 MG: 5 TABLET ORAL at 08:12

## 2020-12-30 RX ADMIN — LOPERAMIDE HYDROCHLORIDE 4 MG: 2 CAPSULE ORAL at 05:12

## 2020-12-30 RX ADMIN — ANTI-FUNGAL POWDER MICONAZOLE NITRATE TALC FREE: 1.42 POWDER TOPICAL at 08:12

## 2020-12-30 RX ADMIN — DULOXETINE HYDROCHLORIDE 60 MG: 60 CAPSULE, DELAYED RELEASE ORAL at 09:12

## 2020-12-30 RX ADMIN — PRAVASTATIN SODIUM 40 MG: 40 TABLET ORAL at 08:12

## 2020-12-30 RX ADMIN — FUROSEMIDE 40 MG: 40 TABLET ORAL at 09:12

## 2020-12-30 RX ADMIN — ENOXAPARIN SODIUM 40 MG: 40 INJECTION SUBCUTANEOUS at 04:12

## 2020-12-30 RX ADMIN — ASPIRIN 81 MG: 81 TABLET, COATED ORAL at 09:12

## 2020-12-30 RX ADMIN — FERROUS SULFATE TAB 325 MG (65 MG ELEMENTAL FE) 325 MG: 325 (65 FE) TAB at 08:12

## 2020-12-30 RX ADMIN — METHOCARBAMOL TABLETS 750 MG: 750 TABLET, COATED ORAL at 12:12

## 2020-12-30 RX ADMIN — MEMANTINE 10 MG: 10 TABLET ORAL at 08:12

## 2020-12-30 RX ADMIN — PANTOPRAZOLE SODIUM 40 MG: 40 TABLET, DELAYED RELEASE ORAL at 05:12

## 2020-12-30 RX ADMIN — VANCOMYCIN HYDROCHLORIDE 1000 MG: 1 INJECTION, POWDER, LYOPHILIZED, FOR SOLUTION INTRAVENOUS at 09:12

## 2020-12-30 RX ADMIN — METHOCARBAMOL TABLETS 750 MG: 750 TABLET, COATED ORAL at 05:12

## 2020-12-30 RX ADMIN — ISOSORBIDE MONONITRATE 60 MG: 30 TABLET, EXTENDED RELEASE ORAL at 09:12

## 2020-12-30 RX ADMIN — OXYCODONE HYDROCHLORIDE 10 MG: 10 TABLET ORAL at 09:12

## 2020-12-30 RX ADMIN — MEMANTINE 10 MG: 10 TABLET ORAL at 09:12

## 2020-12-30 RX ADMIN — POTASSIUM CHLORIDE 40 MEQ: 1500 TABLET, EXTENDED RELEASE ORAL at 09:12

## 2020-12-30 RX ADMIN — CLOPIDOGREL 75 MG: 75 TABLET, FILM COATED ORAL at 09:12

## 2020-12-31 LAB
ALBUMIN SERPL BCP-MCNC: 1.9 G/DL (ref 3.5–5.2)
ALP SERPL-CCNC: 112 U/L (ref 55–135)
ALT SERPL W/O P-5'-P-CCNC: 17 U/L (ref 10–44)
ANION GAP SERPL CALC-SCNC: 5 MMOL/L (ref 8–16)
AST SERPL-CCNC: 29 U/L (ref 10–40)
BASOPHILS # BLD AUTO: 0.01 K/UL (ref 0–0.2)
BASOPHILS NFR BLD: 0.3 % (ref 0–1.9)
BILIRUB SERPL-MCNC: 0.2 MG/DL (ref 0.1–1)
BUN SERPL-MCNC: 6 MG/DL (ref 8–23)
CALCIUM SERPL-MCNC: 8.5 MG/DL (ref 8.7–10.5)
CHLORIDE SERPL-SCNC: 101 MMOL/L (ref 95–110)
CO2 SERPL-SCNC: 31 MMOL/L (ref 23–29)
CREAT SERPL-MCNC: 0.9 MG/DL (ref 0.5–1.4)
DIFFERENTIAL METHOD: ABNORMAL
EOSINOPHIL # BLD AUTO: 0 K/UL (ref 0–0.5)
EOSINOPHIL NFR BLD: 0.6 % (ref 0–8)
ERYTHROCYTE [DISTWIDTH] IN BLOOD BY AUTOMATED COUNT: 16.3 % (ref 11.5–14.5)
EST. GFR  (AFRICAN AMERICAN): >60 ML/MIN/1.73 M^2
EST. GFR  (NON AFRICAN AMERICAN): >60 ML/MIN/1.73 M^2
GLUCOSE SERPL-MCNC: 80 MG/DL (ref 70–110)
HCT VFR BLD AUTO: 33.2 % (ref 37–48.5)
HGB BLD-MCNC: 10.2 G/DL (ref 12–16)
IMM GRANULOCYTES # BLD AUTO: 0.01 K/UL (ref 0–0.04)
IMM GRANULOCYTES NFR BLD AUTO: 0.3 % (ref 0–0.5)
LYMPHOCYTES # BLD AUTO: 1.5 K/UL (ref 1–4.8)
LYMPHOCYTES NFR BLD: 40.9 % (ref 18–48)
MAGNESIUM SERPL-MCNC: 2 MG/DL (ref 1.6–2.6)
MCH RBC QN AUTO: 28 PG (ref 27–31)
MCHC RBC AUTO-ENTMCNC: 30.7 G/DL (ref 32–36)
MCV RBC AUTO: 91 FL (ref 82–98)
MONOCYTES # BLD AUTO: 0.6 K/UL (ref 0.3–1)
MONOCYTES NFR BLD: 17.1 % (ref 4–15)
NEUTROPHILS # BLD AUTO: 1.5 K/UL (ref 1.8–7.7)
NEUTROPHILS NFR BLD: 40.8 % (ref 38–73)
NRBC BLD-RTO: 0 /100 WBC
PLATELET # BLD AUTO: 300 K/UL (ref 150–350)
PMV BLD AUTO: 9.6 FL (ref 9.2–12.9)
POCT GLUCOSE: 136 MG/DL (ref 70–110)
POCT GLUCOSE: 79 MG/DL (ref 70–110)
POTASSIUM SERPL-SCNC: 3.4 MMOL/L (ref 3.5–5.1)
PROT SERPL-MCNC: 5.7 G/DL (ref 6–8.4)
RBC # BLD AUTO: 3.64 M/UL (ref 4–5.4)
SODIUM SERPL-SCNC: 137 MMOL/L (ref 136–145)
WBC # BLD AUTO: 3.62 K/UL (ref 3.9–12.7)

## 2020-12-31 PROCEDURE — 83735 ASSAY OF MAGNESIUM: CPT

## 2020-12-31 PROCEDURE — 97530 THERAPEUTIC ACTIVITIES: CPT

## 2020-12-31 PROCEDURE — 36415 COLL VENOUS BLD VENIPUNCTURE: CPT

## 2020-12-31 PROCEDURE — 25000003 PHARM REV CODE 250: Performed by: NURSE PRACTITIONER

## 2020-12-31 PROCEDURE — 97535 SELF CARE MNGMENT TRAINING: CPT

## 2020-12-31 PROCEDURE — 85025 COMPLETE CBC W/AUTO DIFF WBC: CPT

## 2020-12-31 PROCEDURE — 99900031 HC PATIENT EDUCATION (STAT)

## 2020-12-31 PROCEDURE — 97110 THERAPEUTIC EXERCISES: CPT

## 2020-12-31 PROCEDURE — 94799 UNLISTED PULMONARY SVC/PX: CPT

## 2020-12-31 PROCEDURE — 99900035 HC TECH TIME PER 15 MIN (STAT)

## 2020-12-31 PROCEDURE — 94761 N-INVAS EAR/PLS OXIMETRY MLT: CPT

## 2020-12-31 PROCEDURE — 25000003 PHARM REV CODE 250: Performed by: INTERNAL MEDICINE

## 2020-12-31 PROCEDURE — 63600175 PHARM REV CODE 636 W HCPCS: Performed by: INTERNAL MEDICINE

## 2020-12-31 PROCEDURE — 11000001 HC ACUTE MED/SURG PRIVATE ROOM

## 2020-12-31 PROCEDURE — 80053 COMPREHEN METABOLIC PANEL: CPT

## 2020-12-31 RX ADMIN — ONDANSETRON 4 MG: 4 TABLET, ORALLY DISINTEGRATING ORAL at 11:12

## 2020-12-31 RX ADMIN — PRAVASTATIN SODIUM 40 MG: 40 TABLET ORAL at 08:12

## 2020-12-31 RX ADMIN — OXYCODONE HYDROCHLORIDE 10 MG: 10 TABLET ORAL at 08:12

## 2020-12-31 RX ADMIN — ANTI-FUNGAL POWDER MICONAZOLE NITRATE TALC FREE: 1.42 POWDER TOPICAL at 09:12

## 2020-12-31 RX ADMIN — FERROUS SULFATE TAB 325 MG (65 MG ELEMENTAL FE) 325 MG: 325 (65 FE) TAB at 08:12

## 2020-12-31 RX ADMIN — ASPIRIN 81 MG: 81 TABLET, COATED ORAL at 09:12

## 2020-12-31 RX ADMIN — PANTOPRAZOLE SODIUM 40 MG: 40 TABLET, DELAYED RELEASE ORAL at 05:12

## 2020-12-31 RX ADMIN — CLOPIDOGREL 75 MG: 75 TABLET, FILM COATED ORAL at 09:12

## 2020-12-31 RX ADMIN — MEMANTINE 10 MG: 10 TABLET ORAL at 08:12

## 2020-12-31 RX ADMIN — DULOXETINE HYDROCHLORIDE 60 MG: 60 CAPSULE, DELAYED RELEASE ORAL at 09:12

## 2020-12-31 RX ADMIN — ANTI-FUNGAL POWDER MICONAZOLE NITRATE TALC FREE: 1.42 POWDER TOPICAL at 08:12

## 2020-12-31 RX ADMIN — DONEPEZIL HYDROCHLORIDE 10 MG: 5 TABLET ORAL at 08:12

## 2020-12-31 RX ADMIN — ISOSORBIDE MONONITRATE 60 MG: 30 TABLET, EXTENDED RELEASE ORAL at 09:12

## 2020-12-31 RX ADMIN — ENOXAPARIN SODIUM 40 MG: 40 INJECTION SUBCUTANEOUS at 04:12

## 2020-12-31 RX ADMIN — FUROSEMIDE 40 MG: 40 TABLET ORAL at 09:12

## 2020-12-31 RX ADMIN — METHOCARBAMOL TABLETS 750 MG: 750 TABLET, COATED ORAL at 02:12

## 2020-12-31 RX ADMIN — OXYCODONE HYDROCHLORIDE 10 MG: 10 TABLET ORAL at 09:12

## 2020-12-31 RX ADMIN — VANCOMYCIN HYDROCHLORIDE 1000 MG: 1 INJECTION, POWDER, LYOPHILIZED, FOR SOLUTION INTRAVENOUS at 09:12

## 2020-12-31 RX ADMIN — MEMANTINE 10 MG: 10 TABLET ORAL at 09:12

## 2020-12-31 RX ADMIN — POTASSIUM CHLORIDE 40 MEQ: 1500 TABLET, EXTENDED RELEASE ORAL at 09:12

## 2021-01-01 LAB
TB INDURATION 48 - 72 HR READ: 0 MM
VANCOMYCIN TROUGH SERPL-MCNC: 15.9 UG/ML (ref 10–22)

## 2021-01-01 PROCEDURE — 94799 UNLISTED PULMONARY SVC/PX: CPT

## 2021-01-01 PROCEDURE — 80202 ASSAY OF VANCOMYCIN: CPT

## 2021-01-01 PROCEDURE — 36415 COLL VENOUS BLD VENIPUNCTURE: CPT

## 2021-01-01 PROCEDURE — 99900035 HC TECH TIME PER 15 MIN (STAT)

## 2021-01-01 PROCEDURE — 11000001 HC ACUTE MED/SURG PRIVATE ROOM

## 2021-01-01 PROCEDURE — 94761 N-INVAS EAR/PLS OXIMETRY MLT: CPT

## 2021-01-01 PROCEDURE — 99900031 HC PATIENT EDUCATION (STAT)

## 2021-01-01 PROCEDURE — 63600175 PHARM REV CODE 636 W HCPCS: Performed by: INTERNAL MEDICINE

## 2021-01-01 PROCEDURE — 25000003 PHARM REV CODE 250: Performed by: INTERNAL MEDICINE

## 2021-01-01 PROCEDURE — 25000003 PHARM REV CODE 250: Performed by: NURSE PRACTITIONER

## 2021-01-01 RX ADMIN — METHOCARBAMOL TABLETS 750 MG: 750 TABLET, COATED ORAL at 08:01

## 2021-01-01 RX ADMIN — DONEPEZIL HYDROCHLORIDE 10 MG: 5 TABLET ORAL at 08:01

## 2021-01-01 RX ADMIN — CLOPIDOGREL 75 MG: 75 TABLET, FILM COATED ORAL at 08:01

## 2021-01-01 RX ADMIN — VANCOMYCIN HYDROCHLORIDE 1000 MG: 1 INJECTION, POWDER, LYOPHILIZED, FOR SOLUTION INTRAVENOUS at 10:01

## 2021-01-01 RX ADMIN — ASPIRIN 81 MG: 81 TABLET, COATED ORAL at 08:01

## 2021-01-01 RX ADMIN — BACITRACIN ZINC: 500 OINTMENT TOPICAL at 08:01

## 2021-01-01 RX ADMIN — DULOXETINE HYDROCHLORIDE 60 MG: 60 CAPSULE, DELAYED RELEASE ORAL at 08:01

## 2021-01-01 RX ADMIN — MEMANTINE 10 MG: 10 TABLET ORAL at 08:01

## 2021-01-01 RX ADMIN — ENOXAPARIN SODIUM 40 MG: 40 INJECTION SUBCUTANEOUS at 04:01

## 2021-01-01 RX ADMIN — PRAVASTATIN SODIUM 40 MG: 40 TABLET ORAL at 08:01

## 2021-01-01 RX ADMIN — ANTI-FUNGAL POWDER MICONAZOLE NITRATE TALC FREE: 1.42 POWDER TOPICAL at 08:01

## 2021-01-01 RX ADMIN — PANTOPRAZOLE SODIUM 40 MG: 40 TABLET, DELAYED RELEASE ORAL at 05:01

## 2021-01-01 RX ADMIN — OXYCODONE HYDROCHLORIDE 10 MG: 10 TABLET ORAL at 08:01

## 2021-01-01 RX ADMIN — FUROSEMIDE 40 MG: 40 TABLET ORAL at 08:01

## 2021-01-01 RX ADMIN — POTASSIUM CHLORIDE 40 MEQ: 1500 TABLET, EXTENDED RELEASE ORAL at 08:01

## 2021-01-01 RX ADMIN — FERROUS SULFATE TAB 325 MG (65 MG ELEMENTAL FE) 325 MG: 325 (65 FE) TAB at 08:01

## 2021-01-01 RX ADMIN — OXYCODONE HYDROCHLORIDE 10 MG: 10 TABLET ORAL at 12:01

## 2021-01-01 RX ADMIN — ISOSORBIDE MONONITRATE 60 MG: 30 TABLET, EXTENDED RELEASE ORAL at 08:01

## 2021-01-02 LAB
ALBUMIN SERPL BCP-MCNC: 1.9 G/DL (ref 3.5–5.2)
ALP SERPL-CCNC: 119 U/L (ref 55–135)
ALT SERPL W/O P-5'-P-CCNC: 17 U/L (ref 10–44)
ANION GAP SERPL CALC-SCNC: 2 MMOL/L (ref 8–16)
AST SERPL-CCNC: 25 U/L (ref 10–40)
BASOPHILS # BLD AUTO: 0.01 K/UL (ref 0–0.2)
BASOPHILS NFR BLD: 0.3 % (ref 0–1.9)
BILIRUB SERPL-MCNC: 0.2 MG/DL (ref 0.1–1)
BUN SERPL-MCNC: 6 MG/DL (ref 8–23)
CALCIUM SERPL-MCNC: 8.4 MG/DL (ref 8.7–10.5)
CHLORIDE SERPL-SCNC: 99 MMOL/L (ref 95–110)
CO2 SERPL-SCNC: 33 MMOL/L (ref 23–29)
CREAT SERPL-MCNC: 0.9 MG/DL (ref 0.5–1.4)
DIFFERENTIAL METHOD: ABNORMAL
EOSINOPHIL # BLD AUTO: 0 K/UL (ref 0–0.5)
EOSINOPHIL NFR BLD: 0.8 % (ref 0–8)
ERYTHROCYTE [DISTWIDTH] IN BLOOD BY AUTOMATED COUNT: 16.3 % (ref 11.5–14.5)
EST. GFR  (AFRICAN AMERICAN): >60 ML/MIN/1.73 M^2
EST. GFR  (NON AFRICAN AMERICAN): >60 ML/MIN/1.73 M^2
GLUCOSE SERPL-MCNC: 83 MG/DL (ref 70–110)
HCT VFR BLD AUTO: 34.2 % (ref 37–48.5)
HGB BLD-MCNC: 10.3 G/DL (ref 12–16)
IMM GRANULOCYTES # BLD AUTO: 0.03 K/UL (ref 0–0.04)
IMM GRANULOCYTES NFR BLD AUTO: 0.8 % (ref 0–0.5)
LYMPHOCYTES # BLD AUTO: 1.2 K/UL (ref 1–4.8)
LYMPHOCYTES NFR BLD: 31.9 % (ref 18–48)
MAGNESIUM SERPL-MCNC: 1.9 MG/DL (ref 1.6–2.6)
MCH RBC QN AUTO: 27.5 PG (ref 27–31)
MCHC RBC AUTO-ENTMCNC: 30.1 G/DL (ref 32–36)
MCV RBC AUTO: 91 FL (ref 82–98)
MONOCYTES # BLD AUTO: 0.8 K/UL (ref 0.3–1)
MONOCYTES NFR BLD: 20.1 % (ref 4–15)
NEUTROPHILS # BLD AUTO: 1.7 K/UL (ref 1.8–7.7)
NEUTROPHILS NFR BLD: 46.1 % (ref 38–73)
NRBC BLD-RTO: 0 /100 WBC
PLATELET # BLD AUTO: 283 K/UL (ref 150–350)
PMV BLD AUTO: 9 FL (ref 9.2–12.9)
POTASSIUM SERPL-SCNC: 3.8 MMOL/L (ref 3.5–5.1)
PROT SERPL-MCNC: 5.9 G/DL (ref 6–8.4)
RBC # BLD AUTO: 3.75 M/UL (ref 4–5.4)
SODIUM SERPL-SCNC: 134 MMOL/L (ref 136–145)
WBC # BLD AUTO: 3.73 K/UL (ref 3.9–12.7)

## 2021-01-02 PROCEDURE — 99900035 HC TECH TIME PER 15 MIN (STAT)

## 2021-01-02 PROCEDURE — 97530 THERAPEUTIC ACTIVITIES: CPT

## 2021-01-02 PROCEDURE — 25000003 PHARM REV CODE 250: Performed by: INTERNAL MEDICINE

## 2021-01-02 PROCEDURE — 94799 UNLISTED PULMONARY SVC/PX: CPT

## 2021-01-02 PROCEDURE — 97110 THERAPEUTIC EXERCISES: CPT

## 2021-01-02 PROCEDURE — 80053 COMPREHEN METABOLIC PANEL: CPT

## 2021-01-02 PROCEDURE — 94760 N-INVAS EAR/PLS OXIMETRY 1: CPT

## 2021-01-02 PROCEDURE — 11000001 HC ACUTE MED/SURG PRIVATE ROOM

## 2021-01-02 PROCEDURE — 83735 ASSAY OF MAGNESIUM: CPT

## 2021-01-02 PROCEDURE — 99900031 HC PATIENT EDUCATION (STAT)

## 2021-01-02 PROCEDURE — 63600175 PHARM REV CODE 636 W HCPCS: Performed by: INTERNAL MEDICINE

## 2021-01-02 PROCEDURE — 25000003 PHARM REV CODE 250: Performed by: NURSE PRACTITIONER

## 2021-01-02 PROCEDURE — 36415 COLL VENOUS BLD VENIPUNCTURE: CPT

## 2021-01-02 PROCEDURE — 85025 COMPLETE CBC W/AUTO DIFF WBC: CPT

## 2021-01-02 RX ADMIN — VANCOMYCIN HYDROCHLORIDE 1000 MG: 1 INJECTION, POWDER, LYOPHILIZED, FOR SOLUTION INTRAVENOUS at 10:01

## 2021-01-02 RX ADMIN — OXYCODONE HYDROCHLORIDE 10 MG: 10 TABLET ORAL at 09:01

## 2021-01-02 RX ADMIN — METHOCARBAMOL TABLETS 750 MG: 750 TABLET, COATED ORAL at 02:01

## 2021-01-02 RX ADMIN — ENOXAPARIN SODIUM 40 MG: 40 INJECTION SUBCUTANEOUS at 04:01

## 2021-01-02 RX ADMIN — FERROUS SULFATE TAB 325 MG (65 MG ELEMENTAL FE) 325 MG: 325 (65 FE) TAB at 08:01

## 2021-01-02 RX ADMIN — DULOXETINE HYDROCHLORIDE 60 MG: 60 CAPSULE, DELAYED RELEASE ORAL at 09:01

## 2021-01-02 RX ADMIN — ANTI-FUNGAL POWDER MICONAZOLE NITRATE TALC FREE: 1.42 POWDER TOPICAL at 09:01

## 2021-01-02 RX ADMIN — OXYCODONE HYDROCHLORIDE 10 MG: 10 TABLET ORAL at 08:01

## 2021-01-02 RX ADMIN — FUROSEMIDE 40 MG: 40 TABLET ORAL at 09:01

## 2021-01-02 RX ADMIN — CLOPIDOGREL 75 MG: 75 TABLET, FILM COATED ORAL at 09:01

## 2021-01-02 RX ADMIN — POTASSIUM CHLORIDE 40 MEQ: 1500 TABLET, EXTENDED RELEASE ORAL at 09:01

## 2021-01-02 RX ADMIN — DONEPEZIL HYDROCHLORIDE 10 MG: 5 TABLET ORAL at 08:01

## 2021-01-02 RX ADMIN — PRAVASTATIN SODIUM 40 MG: 40 TABLET ORAL at 08:01

## 2021-01-02 RX ADMIN — MEMANTINE 10 MG: 10 TABLET ORAL at 08:01

## 2021-01-02 RX ADMIN — ASPIRIN 81 MG: 81 TABLET, COATED ORAL at 09:01

## 2021-01-02 RX ADMIN — MEMANTINE 10 MG: 10 TABLET ORAL at 09:01

## 2021-01-02 RX ADMIN — ISOSORBIDE MONONITRATE 60 MG: 30 TABLET, EXTENDED RELEASE ORAL at 09:01

## 2021-01-02 RX ADMIN — PANTOPRAZOLE SODIUM 40 MG: 40 TABLET, DELAYED RELEASE ORAL at 05:01

## 2021-01-03 LAB
BASOPHILS # BLD AUTO: 0.01 K/UL (ref 0–0.2)
BASOPHILS NFR BLD: 0.2 % (ref 0–1.9)
DIFFERENTIAL METHOD: ABNORMAL
EOSINOPHIL # BLD AUTO: 0 K/UL (ref 0–0.5)
EOSINOPHIL NFR BLD: 0.7 % (ref 0–8)
ERYTHROCYTE [DISTWIDTH] IN BLOOD BY AUTOMATED COUNT: 16.2 % (ref 11.5–14.5)
HCT VFR BLD AUTO: 35.8 % (ref 37–48.5)
HGB BLD-MCNC: 10.6 G/DL (ref 12–16)
IMM GRANULOCYTES # BLD AUTO: 0.02 K/UL (ref 0–0.04)
IMM GRANULOCYTES NFR BLD AUTO: 0.5 % (ref 0–0.5)
LYMPHOCYTES # BLD AUTO: 1.3 K/UL (ref 1–4.8)
LYMPHOCYTES NFR BLD: 30.8 % (ref 18–48)
MCH RBC QN AUTO: 27.1 PG (ref 27–31)
MCHC RBC AUTO-ENTMCNC: 29.6 G/DL (ref 32–36)
MCV RBC AUTO: 92 FL (ref 82–98)
MONOCYTES # BLD AUTO: 0.8 K/UL (ref 0.3–1)
MONOCYTES NFR BLD: 18.1 % (ref 4–15)
NEUTROPHILS # BLD AUTO: 2.1 K/UL (ref 1.8–7.7)
NEUTROPHILS NFR BLD: 49.7 % (ref 38–73)
NRBC BLD-RTO: 0 /100 WBC
PLATELET # BLD AUTO: 309 K/UL (ref 150–350)
PMV BLD AUTO: 9.5 FL (ref 9.2–12.9)
RBC # BLD AUTO: 3.91 M/UL (ref 4–5.4)
WBC # BLD AUTO: 4.19 K/UL (ref 3.9–12.7)

## 2021-01-03 PROCEDURE — 25000003 PHARM REV CODE 250: Performed by: INTERNAL MEDICINE

## 2021-01-03 PROCEDURE — 25000003 PHARM REV CODE 250: Performed by: NURSE PRACTITIONER

## 2021-01-03 PROCEDURE — 99900035 HC TECH TIME PER 15 MIN (STAT)

## 2021-01-03 PROCEDURE — 94799 UNLISTED PULMONARY SVC/PX: CPT

## 2021-01-03 PROCEDURE — 94761 N-INVAS EAR/PLS OXIMETRY MLT: CPT

## 2021-01-03 PROCEDURE — 11000001 HC ACUTE MED/SURG PRIVATE ROOM

## 2021-01-03 PROCEDURE — 85025 COMPLETE CBC W/AUTO DIFF WBC: CPT

## 2021-01-03 PROCEDURE — 63600175 PHARM REV CODE 636 W HCPCS: Performed by: INTERNAL MEDICINE

## 2021-01-03 PROCEDURE — 94760 N-INVAS EAR/PLS OXIMETRY 1: CPT

## 2021-01-03 PROCEDURE — 36415 COLL VENOUS BLD VENIPUNCTURE: CPT

## 2021-01-03 RX ADMIN — PANTOPRAZOLE SODIUM 40 MG: 40 TABLET, DELAYED RELEASE ORAL at 05:01

## 2021-01-03 RX ADMIN — MEMANTINE 10 MG: 10 TABLET ORAL at 08:01

## 2021-01-03 RX ADMIN — CLOPIDOGREL 75 MG: 75 TABLET, FILM COATED ORAL at 08:01

## 2021-01-03 RX ADMIN — ASPIRIN 81 MG: 81 TABLET, COATED ORAL at 08:01

## 2021-01-03 RX ADMIN — ANTI-FUNGAL POWDER MICONAZOLE NITRATE TALC FREE: 1.42 POWDER TOPICAL at 08:01

## 2021-01-03 RX ADMIN — DULOXETINE HYDROCHLORIDE 60 MG: 60 CAPSULE, DELAYED RELEASE ORAL at 08:01

## 2021-01-03 RX ADMIN — ENOXAPARIN SODIUM 40 MG: 40 INJECTION SUBCUTANEOUS at 04:01

## 2021-01-03 RX ADMIN — ONDANSETRON 4 MG: 4 TABLET, ORALLY DISINTEGRATING ORAL at 08:01

## 2021-01-03 RX ADMIN — OXYCODONE HYDROCHLORIDE 10 MG: 10 TABLET ORAL at 08:01

## 2021-01-03 RX ADMIN — DONEPEZIL HYDROCHLORIDE 10 MG: 5 TABLET ORAL at 08:01

## 2021-01-03 RX ADMIN — METHOCARBAMOL TABLETS 750 MG: 750 TABLET, COATED ORAL at 05:01

## 2021-01-03 RX ADMIN — VANCOMYCIN HYDROCHLORIDE 1000 MG: 1 INJECTION, POWDER, LYOPHILIZED, FOR SOLUTION INTRAVENOUS at 10:01

## 2021-01-03 RX ADMIN — ANTI-FUNGAL POWDER MICONAZOLE NITRATE TALC FREE: 1.42 POWDER TOPICAL at 09:01

## 2021-01-03 RX ADMIN — FERROUS SULFATE TAB 325 MG (65 MG ELEMENTAL FE) 325 MG: 325 (65 FE) TAB at 08:01

## 2021-01-03 RX ADMIN — PRAVASTATIN SODIUM 40 MG: 40 TABLET ORAL at 08:01

## 2021-01-03 RX ADMIN — FUROSEMIDE 40 MG: 40 TABLET ORAL at 08:01

## 2021-01-03 RX ADMIN — POTASSIUM CHLORIDE 40 MEQ: 1500 TABLET, EXTENDED RELEASE ORAL at 08:01

## 2021-01-03 RX ADMIN — ISOSORBIDE MONONITRATE 60 MG: 30 TABLET, EXTENDED RELEASE ORAL at 08:01

## 2021-01-04 LAB
ALBUMIN SERPL BCP-MCNC: 1.9 G/DL (ref 3.5–5.2)
ALP SERPL-CCNC: 122 U/L (ref 55–135)
ALT SERPL W/O P-5'-P-CCNC: 16 U/L (ref 10–44)
ANION GAP SERPL CALC-SCNC: 4 MMOL/L (ref 8–16)
AST SERPL-CCNC: 24 U/L (ref 10–40)
BASOPHILS # BLD AUTO: 0.01 K/UL (ref 0–0.2)
BASOPHILS NFR BLD: 0.2 % (ref 0–1.9)
BILIRUB SERPL-MCNC: 0.3 MG/DL (ref 0.1–1)
BUN SERPL-MCNC: 8 MG/DL (ref 8–23)
CALCIUM SERPL-MCNC: 8.8 MG/DL (ref 8.7–10.5)
CHLORIDE SERPL-SCNC: 99 MMOL/L (ref 95–110)
CO2 SERPL-SCNC: 35 MMOL/L (ref 23–29)
CREAT SERPL-MCNC: 1 MG/DL (ref 0.5–1.4)
DIFFERENTIAL METHOD: ABNORMAL
EOSINOPHIL # BLD AUTO: 0.1 K/UL (ref 0–0.5)
EOSINOPHIL NFR BLD: 1.5 % (ref 0–8)
ERYTHROCYTE [DISTWIDTH] IN BLOOD BY AUTOMATED COUNT: 16.3 % (ref 11.5–14.5)
EST. GFR  (AFRICAN AMERICAN): >60 ML/MIN/1.73 M^2
EST. GFR  (NON AFRICAN AMERICAN): 56.4 ML/MIN/1.73 M^2
GLUCOSE SERPL-MCNC: 75 MG/DL (ref 70–110)
HCT VFR BLD AUTO: 36.7 % (ref 37–48.5)
HGB BLD-MCNC: 10.8 G/DL (ref 12–16)
IMM GRANULOCYTES # BLD AUTO: 0.02 K/UL (ref 0–0.04)
IMM GRANULOCYTES NFR BLD AUTO: 0.5 % (ref 0–0.5)
LYMPHOCYTES # BLD AUTO: 1.6 K/UL (ref 1–4.8)
LYMPHOCYTES NFR BLD: 40.4 % (ref 18–48)
MAGNESIUM SERPL-MCNC: 2.1 MG/DL (ref 1.6–2.6)
MCH RBC QN AUTO: 27.2 PG (ref 27–31)
MCHC RBC AUTO-ENTMCNC: 29.4 G/DL (ref 32–36)
MCV RBC AUTO: 92 FL (ref 82–98)
MONOCYTES # BLD AUTO: 0.8 K/UL (ref 0.3–1)
MONOCYTES NFR BLD: 20 % (ref 4–15)
NEUTROPHILS # BLD AUTO: 1.5 K/UL (ref 1.8–7.7)
NEUTROPHILS NFR BLD: 37.4 % (ref 38–73)
NRBC BLD-RTO: 0 /100 WBC
PLATELET # BLD AUTO: 324 K/UL (ref 150–350)
PMV BLD AUTO: 9.3 FL (ref 9.2–12.9)
POTASSIUM SERPL-SCNC: 3.5 MMOL/L (ref 3.5–5.1)
PROT SERPL-MCNC: 5.9 G/DL (ref 6–8.4)
RBC # BLD AUTO: 3.97 M/UL (ref 4–5.4)
SODIUM SERPL-SCNC: 138 MMOL/L (ref 136–145)
VANCOMYCIN TROUGH SERPL-MCNC: 14.6 UG/ML (ref 10–22)
WBC # BLD AUTO: 4.01 K/UL (ref 3.9–12.7)

## 2021-01-04 PROCEDURE — 25000003 PHARM REV CODE 250: Performed by: INTERNAL MEDICINE

## 2021-01-04 PROCEDURE — 80053 COMPREHEN METABOLIC PANEL: CPT

## 2021-01-04 PROCEDURE — 94760 N-INVAS EAR/PLS OXIMETRY 1: CPT

## 2021-01-04 PROCEDURE — 11000001 HC ACUTE MED/SURG PRIVATE ROOM

## 2021-01-04 PROCEDURE — 94761 N-INVAS EAR/PLS OXIMETRY MLT: CPT

## 2021-01-04 PROCEDURE — 97535 SELF CARE MNGMENT TRAINING: CPT

## 2021-01-04 PROCEDURE — 85025 COMPLETE CBC W/AUTO DIFF WBC: CPT

## 2021-01-04 PROCEDURE — 97530 THERAPEUTIC ACTIVITIES: CPT

## 2021-01-04 PROCEDURE — 99900031 HC PATIENT EDUCATION (STAT)

## 2021-01-04 PROCEDURE — 80202 ASSAY OF VANCOMYCIN: CPT

## 2021-01-04 PROCEDURE — 97116 GAIT TRAINING THERAPY: CPT

## 2021-01-04 PROCEDURE — 25000003 PHARM REV CODE 250: Performed by: NURSE PRACTITIONER

## 2021-01-04 PROCEDURE — 97110 THERAPEUTIC EXERCISES: CPT

## 2021-01-04 PROCEDURE — 83735 ASSAY OF MAGNESIUM: CPT

## 2021-01-04 PROCEDURE — 99900035 HC TECH TIME PER 15 MIN (STAT)

## 2021-01-04 PROCEDURE — 36415 COLL VENOUS BLD VENIPUNCTURE: CPT

## 2021-01-04 PROCEDURE — 63600175 PHARM REV CODE 636 W HCPCS: Performed by: INTERNAL MEDICINE

## 2021-01-04 RX ADMIN — ASPIRIN 81 MG: 81 TABLET, COATED ORAL at 08:01

## 2021-01-04 RX ADMIN — ANTI-FUNGAL POWDER MICONAZOLE NITRATE TALC FREE: 1.42 POWDER TOPICAL at 08:01

## 2021-01-04 RX ADMIN — MEMANTINE 10 MG: 10 TABLET ORAL at 08:01

## 2021-01-04 RX ADMIN — METHOCARBAMOL TABLETS 750 MG: 750 TABLET, COATED ORAL at 10:01

## 2021-01-04 RX ADMIN — DULOXETINE HYDROCHLORIDE 60 MG: 60 CAPSULE, DELAYED RELEASE ORAL at 08:01

## 2021-01-04 RX ADMIN — OXYCODONE HYDROCHLORIDE 10 MG: 10 TABLET ORAL at 08:01

## 2021-01-04 RX ADMIN — ISOSORBIDE MONONITRATE 60 MG: 30 TABLET, EXTENDED RELEASE ORAL at 08:01

## 2021-01-04 RX ADMIN — FUROSEMIDE 40 MG: 40 TABLET ORAL at 08:01

## 2021-01-04 RX ADMIN — FERROUS SULFATE TAB 325 MG (65 MG ELEMENTAL FE) 325 MG: 325 (65 FE) TAB at 08:01

## 2021-01-04 RX ADMIN — METHOCARBAMOL TABLETS 750 MG: 750 TABLET, COATED ORAL at 04:01

## 2021-01-04 RX ADMIN — VANCOMYCIN HYDROCHLORIDE 1000 MG: 1 INJECTION, POWDER, LYOPHILIZED, FOR SOLUTION INTRAVENOUS at 11:01

## 2021-01-04 RX ADMIN — OXYCODONE HYDROCHLORIDE 10 MG: 10 TABLET ORAL at 11:01

## 2021-01-04 RX ADMIN — PANTOPRAZOLE SODIUM 40 MG: 40 TABLET, DELAYED RELEASE ORAL at 05:01

## 2021-01-04 RX ADMIN — CLOPIDOGREL 75 MG: 75 TABLET, FILM COATED ORAL at 08:01

## 2021-01-04 RX ADMIN — POTASSIUM CHLORIDE 40 MEQ: 1500 TABLET, EXTENDED RELEASE ORAL at 08:01

## 2021-01-04 RX ADMIN — PRAVASTATIN SODIUM 40 MG: 40 TABLET ORAL at 08:01

## 2021-01-04 RX ADMIN — METHOCARBAMOL TABLETS 750 MG: 750 TABLET, COATED ORAL at 08:01

## 2021-01-04 RX ADMIN — ENOXAPARIN SODIUM 40 MG: 40 INJECTION SUBCUTANEOUS at 04:01

## 2021-01-04 RX ADMIN — DONEPEZIL HYDROCHLORIDE 10 MG: 5 TABLET ORAL at 08:01

## 2021-01-05 LAB — VANCOMYCIN TROUGH SERPL-MCNC: 16.1 UG/ML (ref 10–22)

## 2021-01-05 PROCEDURE — 97530 THERAPEUTIC ACTIVITIES: CPT

## 2021-01-05 PROCEDURE — 99900035 HC TECH TIME PER 15 MIN (STAT)

## 2021-01-05 PROCEDURE — 63600175 PHARM REV CODE 636 W HCPCS: Performed by: INTERNAL MEDICINE

## 2021-01-05 PROCEDURE — 80202 ASSAY OF VANCOMYCIN: CPT

## 2021-01-05 PROCEDURE — 25000003 PHARM REV CODE 250: Performed by: INTERNAL MEDICINE

## 2021-01-05 PROCEDURE — 11000001 HC ACUTE MED/SURG PRIVATE ROOM

## 2021-01-05 PROCEDURE — 97110 THERAPEUTIC EXERCISES: CPT

## 2021-01-05 PROCEDURE — 94761 N-INVAS EAR/PLS OXIMETRY MLT: CPT

## 2021-01-05 PROCEDURE — 36415 COLL VENOUS BLD VENIPUNCTURE: CPT

## 2021-01-05 PROCEDURE — 99900031 HC PATIENT EDUCATION (STAT)

## 2021-01-05 PROCEDURE — 94799 UNLISTED PULMONARY SVC/PX: CPT

## 2021-01-05 PROCEDURE — 25000003 PHARM REV CODE 250: Performed by: NURSE PRACTITIONER

## 2021-01-05 RX ADMIN — CLOPIDOGREL 75 MG: 75 TABLET, FILM COATED ORAL at 08:01

## 2021-01-05 RX ADMIN — OXYCODONE HYDROCHLORIDE 10 MG: 10 TABLET ORAL at 08:01

## 2021-01-05 RX ADMIN — DULOXETINE HYDROCHLORIDE 60 MG: 60 CAPSULE, DELAYED RELEASE ORAL at 08:01

## 2021-01-05 RX ADMIN — ONDANSETRON 4 MG: 4 TABLET, ORALLY DISINTEGRATING ORAL at 10:01

## 2021-01-05 RX ADMIN — MEMANTINE 10 MG: 10 TABLET ORAL at 08:01

## 2021-01-05 RX ADMIN — PANTOPRAZOLE SODIUM 40 MG: 40 TABLET, DELAYED RELEASE ORAL at 05:01

## 2021-01-05 RX ADMIN — ASPIRIN 81 MG: 81 TABLET, COATED ORAL at 08:01

## 2021-01-05 RX ADMIN — PRAVASTATIN SODIUM 40 MG: 40 TABLET ORAL at 08:01

## 2021-01-05 RX ADMIN — METHOCARBAMOL TABLETS 750 MG: 750 TABLET, COATED ORAL at 04:01

## 2021-01-05 RX ADMIN — FUROSEMIDE 40 MG: 40 TABLET ORAL at 08:01

## 2021-01-05 RX ADMIN — FERROUS SULFATE TAB 325 MG (65 MG ELEMENTAL FE) 325 MG: 325 (65 FE) TAB at 08:01

## 2021-01-05 RX ADMIN — DONEPEZIL HYDROCHLORIDE 10 MG: 5 TABLET ORAL at 08:01

## 2021-01-05 RX ADMIN — POTASSIUM CHLORIDE 40 MEQ: 1500 TABLET, EXTENDED RELEASE ORAL at 08:01

## 2021-01-05 RX ADMIN — ANTI-FUNGAL POWDER MICONAZOLE NITRATE TALC FREE: 1.42 POWDER TOPICAL at 08:01

## 2021-01-05 RX ADMIN — ENOXAPARIN SODIUM 40 MG: 40 INJECTION SUBCUTANEOUS at 04:01

## 2021-01-05 RX ADMIN — VANCOMYCIN HYDROCHLORIDE 1000 MG: 1 INJECTION, POWDER, LYOPHILIZED, FOR SOLUTION INTRAVENOUS at 10:01

## 2021-01-05 RX ADMIN — ISOSORBIDE MONONITRATE 60 MG: 30 TABLET, EXTENDED RELEASE ORAL at 08:01

## 2021-01-06 LAB
ALBUMIN SERPL BCP-MCNC: 2 G/DL (ref 3.5–5.2)
ALP SERPL-CCNC: 129 U/L (ref 55–135)
ALT SERPL W/O P-5'-P-CCNC: 14 U/L (ref 10–44)
ANION GAP SERPL CALC-SCNC: 6 MMOL/L (ref 8–16)
AST SERPL-CCNC: 14 U/L (ref 10–40)
BASOPHILS # BLD AUTO: 0.01 K/UL (ref 0–0.2)
BASOPHILS NFR BLD: 0.2 % (ref 0–1.9)
BILIRUB SERPL-MCNC: 0.3 MG/DL (ref 0.1–1)
BUN SERPL-MCNC: 8 MG/DL (ref 8–23)
CALCIUM SERPL-MCNC: 9 MG/DL (ref 8.7–10.5)
CHLORIDE SERPL-SCNC: 101 MMOL/L (ref 95–110)
CO2 SERPL-SCNC: 31 MMOL/L (ref 23–29)
CREAT SERPL-MCNC: 1.1 MG/DL (ref 0.5–1.4)
DIFFERENTIAL METHOD: ABNORMAL
EOSINOPHIL # BLD AUTO: 0.1 K/UL (ref 0–0.5)
EOSINOPHIL NFR BLD: 2.2 % (ref 0–8)
ERYTHROCYTE [DISTWIDTH] IN BLOOD BY AUTOMATED COUNT: 16.1 % (ref 11.5–14.5)
EST. GFR  (AFRICAN AMERICAN): 58 ML/MIN/1.73 M^2
EST. GFR  (NON AFRICAN AMERICAN): 50.3 ML/MIN/1.73 M^2
GLUCOSE SERPL-MCNC: 74 MG/DL (ref 70–110)
HCT VFR BLD AUTO: 37.5 % (ref 37–48.5)
HGB BLD-MCNC: 11 G/DL (ref 12–16)
IMM GRANULOCYTES # BLD AUTO: 0.01 K/UL (ref 0–0.04)
IMM GRANULOCYTES NFR BLD AUTO: 0.2 % (ref 0–0.5)
LYMPHOCYTES # BLD AUTO: 1.8 K/UL (ref 1–4.8)
LYMPHOCYTES NFR BLD: 34.9 % (ref 18–48)
MAGNESIUM SERPL-MCNC: 2.2 MG/DL (ref 1.6–2.6)
MCH RBC QN AUTO: 27.3 PG (ref 27–31)
MCHC RBC AUTO-ENTMCNC: 29.3 G/DL (ref 32–36)
MCV RBC AUTO: 93 FL (ref 82–98)
MONOCYTES # BLD AUTO: 0.7 K/UL (ref 0.3–1)
MONOCYTES NFR BLD: 14.3 % (ref 4–15)
NEUTROPHILS # BLD AUTO: 2.4 K/UL (ref 1.8–7.7)
NEUTROPHILS NFR BLD: 48.2 % (ref 38–73)
NRBC BLD-RTO: 0 /100 WBC
PLATELET # BLD AUTO: 391 K/UL (ref 150–350)
PMV BLD AUTO: 9.4 FL (ref 9.2–12.9)
POTASSIUM SERPL-SCNC: 3.6 MMOL/L (ref 3.5–5.1)
PROT SERPL-MCNC: 6.1 G/DL (ref 6–8.4)
RBC # BLD AUTO: 4.03 M/UL (ref 4–5.4)
SODIUM SERPL-SCNC: 138 MMOL/L (ref 136–145)
WBC # BLD AUTO: 5.05 K/UL (ref 3.9–12.7)

## 2021-01-06 PROCEDURE — 97530 THERAPEUTIC ACTIVITIES: CPT

## 2021-01-06 PROCEDURE — 63600175 PHARM REV CODE 636 W HCPCS: Performed by: INTERNAL MEDICINE

## 2021-01-06 PROCEDURE — 94761 N-INVAS EAR/PLS OXIMETRY MLT: CPT

## 2021-01-06 PROCEDURE — 36415 COLL VENOUS BLD VENIPUNCTURE: CPT

## 2021-01-06 PROCEDURE — 25000003 PHARM REV CODE 250: Performed by: INTERNAL MEDICINE

## 2021-01-06 PROCEDURE — 85025 COMPLETE CBC W/AUTO DIFF WBC: CPT

## 2021-01-06 PROCEDURE — 25000003 PHARM REV CODE 250: Performed by: NURSE PRACTITIONER

## 2021-01-06 PROCEDURE — 83735 ASSAY OF MAGNESIUM: CPT

## 2021-01-06 PROCEDURE — 99900031 HC PATIENT EDUCATION (STAT)

## 2021-01-06 PROCEDURE — 94640 AIRWAY INHALATION TREATMENT: CPT

## 2021-01-06 PROCEDURE — 97110 THERAPEUTIC EXERCISES: CPT

## 2021-01-06 PROCEDURE — 97535 SELF CARE MNGMENT TRAINING: CPT

## 2021-01-06 PROCEDURE — 99900035 HC TECH TIME PER 15 MIN (STAT)

## 2021-01-06 PROCEDURE — 25000242 PHARM REV CODE 250 ALT 637 W/ HCPCS: Performed by: INTERNAL MEDICINE

## 2021-01-06 PROCEDURE — 80053 COMPREHEN METABOLIC PANEL: CPT

## 2021-01-06 PROCEDURE — 11000001 HC ACUTE MED/SURG PRIVATE ROOM

## 2021-01-06 PROCEDURE — 94799 UNLISTED PULMONARY SVC/PX: CPT

## 2021-01-06 RX ADMIN — MEMANTINE 10 MG: 10 TABLET ORAL at 08:01

## 2021-01-06 RX ADMIN — PRAVASTATIN SODIUM 40 MG: 40 TABLET ORAL at 08:01

## 2021-01-06 RX ADMIN — PANTOPRAZOLE SODIUM 40 MG: 40 TABLET, DELAYED RELEASE ORAL at 05:01

## 2021-01-06 RX ADMIN — FERROUS SULFATE TAB 325 MG (65 MG ELEMENTAL FE) 325 MG: 325 (65 FE) TAB at 08:01

## 2021-01-06 RX ADMIN — CLOPIDOGREL 75 MG: 75 TABLET, FILM COATED ORAL at 08:01

## 2021-01-06 RX ADMIN — METHOCARBAMOL TABLETS 750 MG: 750 TABLET, COATED ORAL at 12:01

## 2021-01-06 RX ADMIN — POTASSIUM CHLORIDE 40 MEQ: 1500 TABLET, EXTENDED RELEASE ORAL at 08:01

## 2021-01-06 RX ADMIN — FUROSEMIDE 40 MG: 40 TABLET ORAL at 08:01

## 2021-01-06 RX ADMIN — DULOXETINE HYDROCHLORIDE 60 MG: 60 CAPSULE, DELAYED RELEASE ORAL at 08:01

## 2021-01-06 RX ADMIN — BACITRACIN ZINC: 500 OINTMENT TOPICAL at 08:01

## 2021-01-06 RX ADMIN — ISOSORBIDE MONONITRATE 60 MG: 30 TABLET, EXTENDED RELEASE ORAL at 08:01

## 2021-01-06 RX ADMIN — ASPIRIN 81 MG: 81 TABLET, COATED ORAL at 08:01

## 2021-01-06 RX ADMIN — IPRATROPIUM BROMIDE AND ALBUTEROL SULFATE 3 ML: .5; 3 SOLUTION RESPIRATORY (INHALATION) at 07:01

## 2021-01-06 RX ADMIN — METHOCARBAMOL TABLETS 750 MG: 750 TABLET, COATED ORAL at 03:01

## 2021-01-06 RX ADMIN — ANTI-FUNGAL POWDER MICONAZOLE NITRATE TALC FREE: 1.42 POWDER TOPICAL at 09:01

## 2021-01-06 RX ADMIN — ENOXAPARIN SODIUM 40 MG: 40 INJECTION SUBCUTANEOUS at 04:01

## 2021-01-06 RX ADMIN — DONEPEZIL HYDROCHLORIDE 10 MG: 5 TABLET ORAL at 08:01

## 2021-01-06 RX ADMIN — ANTI-FUNGAL POWDER MICONAZOLE NITRATE TALC FREE: 1.42 POWDER TOPICAL at 08:01

## 2021-01-06 RX ADMIN — OXYCODONE HYDROCHLORIDE 10 MG: 10 TABLET ORAL at 08:01

## 2021-01-06 RX ADMIN — VANCOMYCIN HYDROCHLORIDE 1000 MG: 1 INJECTION, POWDER, LYOPHILIZED, FOR SOLUTION INTRAVENOUS at 09:01

## 2021-01-07 PROCEDURE — 97530 THERAPEUTIC ACTIVITIES: CPT

## 2021-01-07 PROCEDURE — 99900035 HC TECH TIME PER 15 MIN (STAT)

## 2021-01-07 PROCEDURE — 97110 THERAPEUTIC EXERCISES: CPT

## 2021-01-07 PROCEDURE — 94799 UNLISTED PULMONARY SVC/PX: CPT

## 2021-01-07 PROCEDURE — 94761 N-INVAS EAR/PLS OXIMETRY MLT: CPT

## 2021-01-07 PROCEDURE — 11000001 HC ACUTE MED/SURG PRIVATE ROOM

## 2021-01-07 PROCEDURE — 63600175 PHARM REV CODE 636 W HCPCS: Performed by: INTERNAL MEDICINE

## 2021-01-07 PROCEDURE — 25000003 PHARM REV CODE 250: Performed by: INTERNAL MEDICINE

## 2021-01-07 PROCEDURE — 27000221 HC OXYGEN, UP TO 24 HOURS

## 2021-01-07 PROCEDURE — 97535 SELF CARE MNGMENT TRAINING: CPT

## 2021-01-07 PROCEDURE — 25000003 PHARM REV CODE 250: Performed by: NURSE PRACTITIONER

## 2021-01-07 PROCEDURE — 99900031 HC PATIENT EDUCATION (STAT)

## 2021-01-07 RX ADMIN — METHOCARBAMOL TABLETS 750 MG: 750 TABLET, COATED ORAL at 01:01

## 2021-01-07 RX ADMIN — CLOPIDOGREL 75 MG: 75 TABLET, FILM COATED ORAL at 08:01

## 2021-01-07 RX ADMIN — MEMANTINE 10 MG: 10 TABLET ORAL at 08:01

## 2021-01-07 RX ADMIN — PRAVASTATIN SODIUM 40 MG: 40 TABLET ORAL at 08:01

## 2021-01-07 RX ADMIN — POTASSIUM CHLORIDE 40 MEQ: 1500 TABLET, EXTENDED RELEASE ORAL at 08:01

## 2021-01-07 RX ADMIN — VANCOMYCIN HYDROCHLORIDE 1000 MG: 1 INJECTION, POWDER, LYOPHILIZED, FOR SOLUTION INTRAVENOUS at 10:01

## 2021-01-07 RX ADMIN — PANTOPRAZOLE SODIUM 40 MG: 40 TABLET, DELAYED RELEASE ORAL at 05:01

## 2021-01-07 RX ADMIN — ISOSORBIDE MONONITRATE 60 MG: 30 TABLET, EXTENDED RELEASE ORAL at 08:01

## 2021-01-07 RX ADMIN — OXYCODONE HYDROCHLORIDE 10 MG: 10 TABLET ORAL at 08:01

## 2021-01-07 RX ADMIN — FUROSEMIDE 40 MG: 40 TABLET ORAL at 08:01

## 2021-01-07 RX ADMIN — DONEPEZIL HYDROCHLORIDE 10 MG: 5 TABLET ORAL at 08:01

## 2021-01-07 RX ADMIN — ANTI-FUNGAL POWDER MICONAZOLE NITRATE TALC FREE: 1.42 POWDER TOPICAL at 08:01

## 2021-01-07 RX ADMIN — ENOXAPARIN SODIUM 40 MG: 40 INJECTION SUBCUTANEOUS at 04:01

## 2021-01-07 RX ADMIN — ACETAMINOPHEN 650 MG: 325 TABLET ORAL at 08:01

## 2021-01-07 RX ADMIN — FERROUS SULFATE TAB 325 MG (65 MG ELEMENTAL FE) 325 MG: 325 (65 FE) TAB at 08:01

## 2021-01-07 RX ADMIN — DULOXETINE HYDROCHLORIDE 60 MG: 60 CAPSULE, DELAYED RELEASE ORAL at 08:01

## 2021-01-07 RX ADMIN — ASPIRIN 81 MG: 81 TABLET, COATED ORAL at 08:01

## 2021-01-08 VITALS
TEMPERATURE: 98 F | RESPIRATION RATE: 16 BRPM | BODY MASS INDEX: 41.95 KG/M2 | HEART RATE: 73 BPM | HEIGHT: 70 IN | SYSTOLIC BLOOD PRESSURE: 125 MMHG | OXYGEN SATURATION: 93 % | DIASTOLIC BLOOD PRESSURE: 57 MMHG | WEIGHT: 293 LBS

## 2021-01-08 LAB
ALBUMIN SERPL BCP-MCNC: 2 G/DL (ref 3.5–5.2)
ALP SERPL-CCNC: 125 U/L (ref 55–135)
ALT SERPL W/O P-5'-P-CCNC: 14 U/L (ref 10–44)
ANION GAP SERPL CALC-SCNC: 4 MMOL/L (ref 8–16)
AST SERPL-CCNC: 11 U/L (ref 10–40)
BASOPHILS # BLD AUTO: 0.02 K/UL (ref 0–0.2)
BASOPHILS NFR BLD: 0.4 % (ref 0–1.9)
BILIRUB SERPL-MCNC: 0.3 MG/DL (ref 0.1–1)
BUN SERPL-MCNC: 11 MG/DL (ref 8–23)
CALCIUM SERPL-MCNC: 9 MG/DL (ref 8.7–10.5)
CHLORIDE SERPL-SCNC: 105 MMOL/L (ref 95–110)
CO2 SERPL-SCNC: 32 MMOL/L (ref 23–29)
CREAT SERPL-MCNC: 1.1 MG/DL (ref 0.5–1.4)
DIFFERENTIAL METHOD: ABNORMAL
EOSINOPHIL # BLD AUTO: 0.1 K/UL (ref 0–0.5)
EOSINOPHIL NFR BLD: 1.9 % (ref 0–8)
ERYTHROCYTE [DISTWIDTH] IN BLOOD BY AUTOMATED COUNT: 16.4 % (ref 11.5–14.5)
EST. GFR  (AFRICAN AMERICAN): 58 ML/MIN/1.73 M^2
EST. GFR  (NON AFRICAN AMERICAN): 50.3 ML/MIN/1.73 M^2
GLUCOSE SERPL-MCNC: 82 MG/DL (ref 70–110)
HCT VFR BLD AUTO: 35.5 % (ref 37–48.5)
HGB BLD-MCNC: 10.6 G/DL (ref 12–16)
IMM GRANULOCYTES # BLD AUTO: 0.03 K/UL (ref 0–0.04)
IMM GRANULOCYTES NFR BLD AUTO: 0.6 % (ref 0–0.5)
LYMPHOCYTES # BLD AUTO: 1.4 K/UL (ref 1–4.8)
LYMPHOCYTES NFR BLD: 30.3 % (ref 18–48)
MAGNESIUM SERPL-MCNC: 1.8 MG/DL (ref 1.6–2.6)
MCH RBC QN AUTO: 27.5 PG (ref 27–31)
MCHC RBC AUTO-ENTMCNC: 29.9 G/DL (ref 32–36)
MCV RBC AUTO: 92 FL (ref 82–98)
MONOCYTES # BLD AUTO: 0.8 K/UL (ref 0.3–1)
MONOCYTES NFR BLD: 17.3 % (ref 4–15)
NEUTROPHILS # BLD AUTO: 2.3 K/UL (ref 1.8–7.7)
NEUTROPHILS NFR BLD: 49.5 % (ref 38–73)
NRBC BLD-RTO: 0 /100 WBC
PLATELET # BLD AUTO: 390 K/UL (ref 150–350)
PMV BLD AUTO: 9.2 FL (ref 9.2–12.9)
POTASSIUM SERPL-SCNC: 3.5 MMOL/L (ref 3.5–5.1)
PROT SERPL-MCNC: 5.8 G/DL (ref 6–8.4)
RBC # BLD AUTO: 3.85 M/UL (ref 4–5.4)
SODIUM SERPL-SCNC: 141 MMOL/L (ref 136–145)
VANCOMYCIN TROUGH SERPL-MCNC: 18.3 UG/ML (ref 10–22)
WBC # BLD AUTO: 4.69 K/UL (ref 3.9–12.7)

## 2021-01-08 PROCEDURE — 25000003 PHARM REV CODE 250: Performed by: NURSE PRACTITIONER

## 2021-01-08 PROCEDURE — 99900035 HC TECH TIME PER 15 MIN (STAT)

## 2021-01-08 PROCEDURE — 25000003 PHARM REV CODE 250: Performed by: INTERNAL MEDICINE

## 2021-01-08 PROCEDURE — 80202 ASSAY OF VANCOMYCIN: CPT

## 2021-01-08 PROCEDURE — 36415 COLL VENOUS BLD VENIPUNCTURE: CPT

## 2021-01-08 PROCEDURE — 99900031 HC PATIENT EDUCATION (STAT)

## 2021-01-08 PROCEDURE — 85025 COMPLETE CBC W/AUTO DIFF WBC: CPT

## 2021-01-08 PROCEDURE — 94761 N-INVAS EAR/PLS OXIMETRY MLT: CPT

## 2021-01-08 PROCEDURE — 83735 ASSAY OF MAGNESIUM: CPT

## 2021-01-08 PROCEDURE — 94799 UNLISTED PULMONARY SVC/PX: CPT

## 2021-01-08 PROCEDURE — 63600175 PHARM REV CODE 636 W HCPCS: Performed by: INTERNAL MEDICINE

## 2021-01-08 PROCEDURE — 80053 COMPREHEN METABOLIC PANEL: CPT

## 2021-01-08 RX ORDER — MICONAZOLE NITRATE 2 %
POWDER (GRAM) TOPICAL 2 TIMES DAILY
Qty: 85 G | Refills: 2 | Status: SHIPPED | OUTPATIENT
Start: 2021-01-08 | End: 2021-03-17 | Stop reason: SDUPTHER

## 2021-01-08 RX ORDER — METHOCARBAMOL 750 MG/1
750 TABLET, FILM COATED ORAL 3 TIMES DAILY PRN
Qty: 30 TABLET | Refills: 3 | Status: ON HOLD | OUTPATIENT
Start: 2021-01-08 | End: 2022-04-12 | Stop reason: HOSPADM

## 2021-01-08 RX ORDER — POTASSIUM CHLORIDE 20 MEQ/1
20 TABLET, EXTENDED RELEASE ORAL DAILY
Qty: 30 TABLET | Refills: 2 | Status: ON HOLD | OUTPATIENT
Start: 2021-01-08 | End: 2022-04-12 | Stop reason: HOSPADM

## 2021-01-08 RX ADMIN — PANTOPRAZOLE SODIUM 40 MG: 40 TABLET, DELAYED RELEASE ORAL at 05:01

## 2021-01-08 RX ADMIN — POTASSIUM CHLORIDE 40 MEQ: 1500 TABLET, EXTENDED RELEASE ORAL at 08:01

## 2021-01-08 RX ADMIN — ANTI-FUNGAL POWDER MICONAZOLE NITRATE TALC FREE: 1.42 POWDER TOPICAL at 08:01

## 2021-01-08 RX ADMIN — FUROSEMIDE 40 MG: 40 TABLET ORAL at 08:01

## 2021-01-08 RX ADMIN — ASPIRIN 81 MG: 81 TABLET, COATED ORAL at 08:01

## 2021-01-08 RX ADMIN — VANCOMYCIN HYDROCHLORIDE 750 MG: 750 INJECTION, POWDER, LYOPHILIZED, FOR SOLUTION INTRAVENOUS at 10:01

## 2021-01-08 RX ADMIN — MEMANTINE 10 MG: 10 TABLET ORAL at 08:01

## 2021-01-08 RX ADMIN — ISOSORBIDE MONONITRATE 60 MG: 30 TABLET, EXTENDED RELEASE ORAL at 08:01

## 2021-01-08 RX ADMIN — DULOXETINE HYDROCHLORIDE 60 MG: 60 CAPSULE, DELAYED RELEASE ORAL at 08:01

## 2021-01-08 RX ADMIN — OXYCODONE HYDROCHLORIDE 10 MG: 10 TABLET ORAL at 08:01

## 2021-01-08 RX ADMIN — CLOPIDOGREL 75 MG: 75 TABLET, FILM COATED ORAL at 08:01

## 2021-01-08 RX ADMIN — ALUMINUM HYDROXIDE, MAGNESIUM HYDROXIDE, AND SIMETHICONE 30 ML: 200; 200; 20 SUSPENSION ORAL at 12:01

## 2021-01-08 RX ADMIN — METHOCARBAMOL TABLETS 750 MG: 750 TABLET, COATED ORAL at 02:01

## 2021-01-12 LAB
FUNGUS SPEC CULT: NORMAL
FUNGUS SPEC CULT: NORMAL

## 2021-01-13 ENCOUNTER — LAB VISIT (OUTPATIENT)
Dept: LAB | Facility: HOSPITAL | Age: 71
End: 2021-01-13
Attending: INTERNAL MEDICINE
Payer: MEDICARE

## 2021-01-13 DIAGNOSIS — M46.44 DISCITIS OF THORACIC REGION: Primary | ICD-10-CM

## 2021-01-13 LAB
ALBUMIN SERPL BCP-MCNC: 2.3 G/DL (ref 3.5–5.2)
ALP SERPL-CCNC: 130 U/L (ref 55–135)
ALT SERPL W/O P-5'-P-CCNC: 12 U/L (ref 10–44)
ANION GAP SERPL CALC-SCNC: 5 MMOL/L (ref 8–16)
AST SERPL-CCNC: 8 U/L (ref 10–40)
BASOPHILS # BLD AUTO: 0.03 K/UL (ref 0–0.2)
BASOPHILS NFR BLD: 0.5 % (ref 0–1.9)
BILIRUB SERPL-MCNC: 0.3 MG/DL (ref 0.1–1)
BUN SERPL-MCNC: 14 MG/DL (ref 8–23)
CALCIUM SERPL-MCNC: 9.5 MG/DL (ref 8.7–10.5)
CHLORIDE SERPL-SCNC: 108 MMOL/L (ref 95–110)
CO2 SERPL-SCNC: 30 MMOL/L (ref 23–29)
CREAT SERPL-MCNC: 1 MG/DL (ref 0.5–1.4)
CRP SERPL-MCNC: 0.48 MG/DL (ref 0–0.75)
DIFFERENTIAL METHOD: ABNORMAL
EOSINOPHIL # BLD AUTO: 0.1 K/UL (ref 0–0.5)
EOSINOPHIL NFR BLD: 2.1 % (ref 0–8)
ERYTHROCYTE [DISTWIDTH] IN BLOOD BY AUTOMATED COUNT: 16.9 % (ref 11.5–14.5)
ERYTHROCYTE [SEDIMENTATION RATE] IN BLOOD: 39 MM/HR (ref 0–20)
EST. GFR  (AFRICAN AMERICAN): >60 ML/MIN/1.73 M^2
EST. GFR  (NON AFRICAN AMERICAN): 56.4 ML/MIN/1.73 M^2
GLUCOSE SERPL-MCNC: 94 MG/DL (ref 70–110)
HCT VFR BLD AUTO: 40.2 % (ref 37–48.5)
HGB BLD-MCNC: 11.4 G/DL (ref 12–16)
IMM GRANULOCYTES # BLD AUTO: 0.05 K/UL (ref 0–0.04)
IMM GRANULOCYTES NFR BLD AUTO: 0.8 % (ref 0–0.5)
LYMPHOCYTES # BLD AUTO: 1.4 K/UL (ref 1–4.8)
LYMPHOCYTES NFR BLD: 21.8 % (ref 18–48)
MCH RBC QN AUTO: 27.6 PG (ref 27–31)
MCHC RBC AUTO-ENTMCNC: 28.4 G/DL (ref 32–36)
MCV RBC AUTO: 97 FL (ref 82–98)
MONOCYTES # BLD AUTO: 0.9 K/UL (ref 0.3–1)
MONOCYTES NFR BLD: 13.7 % (ref 4–15)
NEUTROPHILS # BLD AUTO: 3.8 K/UL (ref 1.8–7.7)
NEUTROPHILS NFR BLD: 61.1 % (ref 38–73)
NRBC BLD-RTO: 0 /100 WBC
PLATELET # BLD AUTO: 425 K/UL (ref 150–350)
PMV BLD AUTO: 9.3 FL (ref 9.2–12.9)
POTASSIUM SERPL-SCNC: 3.4 MMOL/L (ref 3.5–5.1)
PROT SERPL-MCNC: 6.1 G/DL (ref 6–8.4)
RBC # BLD AUTO: 4.13 M/UL (ref 4–5.4)
SODIUM SERPL-SCNC: 143 MMOL/L (ref 136–145)
VANCOMYCIN TROUGH SERPL-MCNC: 13.8 UG/ML (ref 10–22)
WBC # BLD AUTO: 6.19 K/UL (ref 3.9–12.7)

## 2021-01-13 PROCEDURE — 86140 C-REACTIVE PROTEIN: CPT

## 2021-01-13 PROCEDURE — 85025 COMPLETE CBC W/AUTO DIFF WBC: CPT

## 2021-01-13 PROCEDURE — 85651 RBC SED RATE NONAUTOMATED: CPT

## 2021-01-13 PROCEDURE — 80202 ASSAY OF VANCOMYCIN: CPT

## 2021-01-13 PROCEDURE — 36415 COLL VENOUS BLD VENIPUNCTURE: CPT

## 2021-01-13 PROCEDURE — 80053 COMPREHEN METABOLIC PANEL: CPT

## 2021-01-18 ENCOUNTER — LAB VISIT (OUTPATIENT)
Dept: LAB | Facility: HOSPITAL | Age: 71
End: 2021-01-18
Attending: INTERNAL MEDICINE
Payer: OTHER GOVERNMENT

## 2021-01-18 DIAGNOSIS — Z79.2 LONG TERM (CURRENT) USE OF ANTIBIOTICS: ICD-10-CM

## 2021-01-18 DIAGNOSIS — M46.24 OSTEOMYELITIS OF VERTEBRA OF THORACIC REGION: ICD-10-CM

## 2021-01-18 DIAGNOSIS — M46.44 DISCITIS OF THORACIC REGION: Primary | ICD-10-CM

## 2021-01-18 LAB
ALBUMIN SERPL BCP-MCNC: 2.1 G/DL (ref 3.5–5.2)
ALP SERPL-CCNC: 122 U/L (ref 55–135)
ALT SERPL W/O P-5'-P-CCNC: 11 U/L (ref 10–44)
ANION GAP SERPL CALC-SCNC: 5 MMOL/L (ref 8–16)
AST SERPL-CCNC: 9 U/L (ref 10–40)
BASOPHILS # BLD AUTO: 0.04 K/UL (ref 0–0.2)
BASOPHILS NFR BLD: 0.6 % (ref 0–1.9)
BILIRUB SERPL-MCNC: 0.3 MG/DL (ref 0.1–1)
BUN SERPL-MCNC: 11 MG/DL (ref 8–23)
CALCIUM SERPL-MCNC: 9.6 MG/DL (ref 8.7–10.5)
CHLORIDE SERPL-SCNC: 107 MMOL/L (ref 95–110)
CO2 SERPL-SCNC: 29 MMOL/L (ref 23–29)
CREAT SERPL-MCNC: 1.1 MG/DL (ref 0.5–1.4)
CRP SERPL-MCNC: 1.14 MG/DL (ref 0–0.75)
DIFFERENTIAL METHOD: ABNORMAL
EOSINOPHIL # BLD AUTO: 0.1 K/UL (ref 0–0.5)
EOSINOPHIL NFR BLD: 2 % (ref 0–8)
ERYTHROCYTE [DISTWIDTH] IN BLOOD BY AUTOMATED COUNT: 16.1 % (ref 11.5–14.5)
ERYTHROCYTE [SEDIMENTATION RATE] IN BLOOD: 45 MM/HR (ref 0–20)
EST. GFR  (AFRICAN AMERICAN): 58 ML/MIN/1.73 M^2
EST. GFR  (NON AFRICAN AMERICAN): 50.3 ML/MIN/1.73 M^2
GLUCOSE SERPL-MCNC: 144 MG/DL (ref 70–110)
HCT VFR BLD AUTO: 37.3 % (ref 37–48.5)
HGB BLD-MCNC: 10.8 G/DL (ref 12–16)
IMM GRANULOCYTES # BLD AUTO: 0.03 K/UL (ref 0–0.04)
IMM GRANULOCYTES NFR BLD AUTO: 0.4 % (ref 0–0.5)
LYMPHOCYTES # BLD AUTO: 1.4 K/UL (ref 1–4.8)
LYMPHOCYTES NFR BLD: 20.5 % (ref 18–48)
MCH RBC QN AUTO: 28.3 PG (ref 27–31)
MCHC RBC AUTO-ENTMCNC: 29 G/DL (ref 32–36)
MCV RBC AUTO: 98 FL (ref 82–98)
MONOCYTES # BLD AUTO: 0.7 K/UL (ref 0.3–1)
MONOCYTES NFR BLD: 10.6 % (ref 4–15)
NEUTROPHILS # BLD AUTO: 4.6 K/UL (ref 1.8–7.7)
NEUTROPHILS NFR BLD: 65.9 % (ref 38–73)
NRBC BLD-RTO: 0 /100 WBC
PLATELET # BLD AUTO: 361 K/UL (ref 150–350)
PMV BLD AUTO: 9.4 FL (ref 9.2–12.9)
POTASSIUM SERPL-SCNC: 3.1 MMOL/L (ref 3.5–5.1)
PROT SERPL-MCNC: 5.9 G/DL (ref 6–8.4)
RBC # BLD AUTO: 3.82 M/UL (ref 4–5.4)
SODIUM SERPL-SCNC: 141 MMOL/L (ref 136–145)
VANCOMYCIN TROUGH SERPL-MCNC: 10.8 UG/ML (ref 10–22)
WBC # BLD AUTO: 6.91 K/UL (ref 3.9–12.7)

## 2021-01-18 PROCEDURE — 80202 ASSAY OF VANCOMYCIN: CPT

## 2021-01-18 PROCEDURE — 80053 COMPREHEN METABOLIC PANEL: CPT

## 2021-01-18 PROCEDURE — 85651 RBC SED RATE NONAUTOMATED: CPT

## 2021-01-18 PROCEDURE — 86140 C-REACTIVE PROTEIN: CPT

## 2021-01-18 PROCEDURE — 36415 COLL VENOUS BLD VENIPUNCTURE: CPT

## 2021-01-18 PROCEDURE — 85025 COMPLETE CBC W/AUTO DIFF WBC: CPT

## 2021-01-19 ENCOUNTER — INFUSION (OUTPATIENT)
Dept: INFECTIOUS DISEASES | Facility: HOSPITAL | Age: 71
End: 2021-01-19
Attending: INTERNAL MEDICINE
Payer: MEDICARE

## 2021-01-19 ENCOUNTER — OFFICE VISIT (OUTPATIENT)
Dept: INFECTIOUS DISEASES | Facility: CLINIC | Age: 71
End: 2021-01-19
Payer: MEDICARE

## 2021-01-19 VITALS
SYSTOLIC BLOOD PRESSURE: 146 MMHG | DIASTOLIC BLOOD PRESSURE: 81 MMHG | HEIGHT: 70 IN | WEIGHT: 293 LBS | TEMPERATURE: 98 F | HEART RATE: 82 BPM | BODY MASS INDEX: 41.95 KG/M2

## 2021-01-19 DIAGNOSIS — M46.24 OSTEOMYELITIS OF THORACIC VERTEBRA: ICD-10-CM

## 2021-01-19 DIAGNOSIS — R78.81 BACTEREMIA DUE TO STAPHYLOCOCCUS AUREUS: Primary | ICD-10-CM

## 2021-01-19 DIAGNOSIS — R53.81 DEBILITY: ICD-10-CM

## 2021-01-19 DIAGNOSIS — B95.61 BACTEREMIA DUE TO STAPHYLOCOCCUS AUREUS: Primary | ICD-10-CM

## 2021-01-19 DIAGNOSIS — M46.44 DISCITIS OF THORACIC REGION: ICD-10-CM

## 2021-01-19 PROCEDURE — 99999 PR PBB SHADOW E&M-EST. PATIENT-LVL IV: ICD-10-PCS | Mod: PBBFAC,,, | Performed by: PHYSICIAN ASSISTANT

## 2021-01-19 PROCEDURE — 99213 PR OFFICE/OUTPT VISIT, EST, LEVL III, 20-29 MIN: ICD-10-PCS | Mod: S$PBB,,, | Performed by: PHYSICIAN ASSISTANT

## 2021-01-19 PROCEDURE — 99213 OFFICE O/P EST LOW 20 MIN: CPT | Mod: S$PBB,,, | Performed by: PHYSICIAN ASSISTANT

## 2021-01-19 PROCEDURE — 99214 OFFICE O/P EST MOD 30 MIN: CPT | Mod: PBBFAC | Performed by: PHYSICIAN ASSISTANT

## 2021-01-19 PROCEDURE — 99999 PR PBB SHADOW E&M-EST. PATIENT-LVL IV: CPT | Mod: PBBFAC,,, | Performed by: PHYSICIAN ASSISTANT

## 2021-01-19 RX ORDER — DOXYCYCLINE 100 MG/1
100 CAPSULE ORAL EVERY 12 HOURS
Qty: 84 CAPSULE | Refills: 3 | Status: SHIPPED | OUTPATIENT
Start: 2021-01-19 | End: 2021-05-21

## 2021-01-27 ENCOUNTER — TELEPHONE (OUTPATIENT)
Dept: NEUROSURGERY | Facility: CLINIC | Age: 71
End: 2021-01-27

## 2021-02-03 ENCOUNTER — HOSPITAL ENCOUNTER (OUTPATIENT)
Dept: RADIOLOGY | Facility: HOSPITAL | Age: 71
Discharge: HOME | End: 2021-02-03
Attending: PHYSICIAN ASSISTANT
Payer: MEDICARE

## 2021-02-03 DIAGNOSIS — S22.009A THORACIC SPINE FRACTURE: ICD-10-CM

## 2021-02-03 DIAGNOSIS — M46.24 OSTEOMYELITIS OF THORACIC VERTEBRA: ICD-10-CM

## 2021-02-03 PROCEDURE — 72070 X-RAY EXAM THORAC SPINE 2VWS: CPT | Mod: TC

## 2021-02-04 ENCOUNTER — TELEPHONE (OUTPATIENT)
Dept: NEUROSURGERY | Facility: CLINIC | Age: 71
End: 2021-02-04

## 2021-02-11 ENCOUNTER — OFFICE VISIT (OUTPATIENT)
Dept: NEUROSURGERY | Facility: CLINIC | Age: 71
End: 2021-02-11
Payer: MEDICARE

## 2021-02-11 DIAGNOSIS — Z98.1 HISTORY OF THORACIC SPINAL FUSION: Primary | ICD-10-CM

## 2021-02-11 PROCEDURE — 99024 PR POST-OP FOLLOW-UP VISIT: ICD-10-PCS | Mod: 95,POP,, | Performed by: NEUROLOGICAL SURGERY

## 2021-02-11 PROCEDURE — 99024 POSTOP FOLLOW-UP VISIT: CPT | Mod: 95,POP,, | Performed by: NEUROLOGICAL SURGERY

## 2021-03-17 ENCOUNTER — OFFICE VISIT (OUTPATIENT)
Dept: INFECTIOUS DISEASES | Facility: CLINIC | Age: 71
End: 2021-03-17
Payer: MEDICARE

## 2021-03-17 DIAGNOSIS — M46.44 DISCITIS OF THORACIC REGION: ICD-10-CM

## 2021-03-17 DIAGNOSIS — R78.81 MRSA BACTEREMIA: ICD-10-CM

## 2021-03-17 DIAGNOSIS — M46.24 OSTEOMYELITIS OF THORACIC VERTEBRA: ICD-10-CM

## 2021-03-17 DIAGNOSIS — L30.4 INTERTRIGO: Primary | ICD-10-CM

## 2021-03-17 DIAGNOSIS — B95.62 MRSA BACTEREMIA: ICD-10-CM

## 2021-03-17 PROCEDURE — 99213 PR OFFICE/OUTPT VISIT, EST, LEVL III, 20-29 MIN: ICD-10-PCS | Mod: 95,,, | Performed by: PHYSICIAN ASSISTANT

## 2021-03-17 PROCEDURE — 99213 OFFICE O/P EST LOW 20 MIN: CPT | Mod: 95,,, | Performed by: PHYSICIAN ASSISTANT

## 2021-03-17 RX ORDER — FLUCONAZOLE 200 MG/1
200 TABLET ORAL DAILY
Qty: 5 TABLET | Refills: 0 | Status: SHIPPED | OUTPATIENT
Start: 2021-03-17 | End: 2021-03-22

## 2021-03-17 RX ORDER — MICONAZOLE NITRATE 2 %
POWDER (GRAM) TOPICAL 2 TIMES DAILY
Qty: 85 G | Refills: 0 | Status: SHIPPED | OUTPATIENT
Start: 2021-03-17 | End: 2022-04-12

## 2021-03-19 ENCOUNTER — TELEPHONE (OUTPATIENT)
Dept: INFECTIOUS DISEASES | Facility: HOSPITAL | Age: 71
End: 2021-03-19

## 2021-03-19 ENCOUNTER — HOSPITAL ENCOUNTER (OUTPATIENT)
Dept: RADIOLOGY | Facility: HOSPITAL | Age: 71
Discharge: HOME | End: 2021-03-19
Attending: NEUROLOGICAL SURGERY
Payer: MEDICARE

## 2021-03-19 DIAGNOSIS — Z98.1 HISTORY OF THORACIC SPINAL FUSION: ICD-10-CM

## 2021-03-19 PROCEDURE — 72157 MRI CHEST SPINE W/O & W/DYE: CPT | Mod: 26,,, | Performed by: RADIOLOGY

## 2021-03-19 PROCEDURE — 72128 CT CHEST SPINE W/O DYE: CPT | Mod: 26,,, | Performed by: RADIOLOGY

## 2021-03-19 PROCEDURE — A9585 GADOBUTROL INJECTION: HCPCS | Performed by: NEUROLOGICAL SURGERY

## 2021-03-19 PROCEDURE — 72157 MRI THORACIC SPINE W WO CONTRAST: ICD-10-PCS | Mod: 26,,, | Performed by: RADIOLOGY

## 2021-03-19 PROCEDURE — 25500020 PHARM REV CODE 255: Performed by: NEUROLOGICAL SURGERY

## 2021-03-19 PROCEDURE — 72128 CT THORACIC SPINE WITHOUT CONTRAST: ICD-10-PCS | Mod: 26,,, | Performed by: RADIOLOGY

## 2021-03-19 PROCEDURE — 72157 MRI CHEST SPINE W/O & W/DYE: CPT | Mod: TC

## 2021-03-19 PROCEDURE — 72128 CT CHEST SPINE W/O DYE: CPT | Mod: TC

## 2021-03-19 RX ORDER — GADOBUTROL 604.72 MG/ML
10 INJECTION INTRAVENOUS
Status: COMPLETED | OUTPATIENT
Start: 2021-03-19 | End: 2021-03-19

## 2021-03-19 RX ADMIN — GADOBUTROL 10 ML: 604.72 INJECTION INTRAVENOUS at 12:03

## 2021-04-05 ENCOUNTER — OFFICE VISIT (OUTPATIENT)
Dept: SPINE | Facility: CLINIC | Age: 71
End: 2021-04-05
Payer: MEDICARE

## 2021-04-05 DIAGNOSIS — Z98.1 HISTORY OF THORACIC SPINAL FUSION: Primary | ICD-10-CM

## 2021-04-05 PROCEDURE — 99213 OFFICE O/P EST LOW 20 MIN: CPT | Mod: 95,,, | Performed by: NEUROLOGICAL SURGERY

## 2021-04-05 PROCEDURE — 99213 PR OFFICE/OUTPT VISIT, EST, LEVL III, 20-29 MIN: ICD-10-PCS | Mod: 95,,, | Performed by: NEUROLOGICAL SURGERY

## 2021-04-18 NOTE — SUBJECTIVE & OBJECTIVE
Interval History: NAEON. AFVSS. Pt resting this morning, c/o continued incisional back pain and R hip pain. Neuro exam stable. RLE more limited today 2/2 hip pain but grossly full strength. Will check XR of pelvis. Denies any new weakness, paresthesias, and b/b dysfunction. Repeat Vanc trough tonight, likely plan for DC to rehab tomorrow.    Medications:  Continuous Infusions:  Scheduled Meds:   acetaminophen  650 mg Oral Q6H    aspirin  81 mg Oral Daily    bacitracin zinc   Topical (Top) BID    bisacodyL  10 mg Rectal Daily    ceftaroline fosamil  600 mg Intravenous Q8H    clopidogreL  75 mg Oral Daily    donepeziL  10 mg Oral QHS    DULoxetine  60 mg Oral Daily    ferrous sulfate  325 mg Oral QHS    heparin (porcine)  5,000 Units Subcutaneous Q8H    isosorbide mononitrate  60 mg Oral Daily    lisinopriL  2.5 mg Oral Daily    memantine  10 mg Oral BID    methocarbamoL  750 mg Oral QID    pantoprazole  40 mg Oral Before breakfast    pravastatin  40 mg Oral QHS    senna-docusate 8.6-50 mg  1 tablet Oral BID    vancomycin (VANCOCIN) IVPB  1,500 mg Intravenous Q24H     PRN Meds:sodium chloride, albuterol-ipratropium, aluminum-magnesium hydroxide-simethicone, dextrose 50%, dextrose 50%, glucagon (human recombinant), glucose, glucose, glucose, insulin aspart U-100, labetaloL, morphine, ondansetron, oxyCODONE, oxyCODONE, promethazine (PHENERGAN) IVPB, senna-docusate 8.6-50 mg, Pharmacy to dose Vancomycin consult **AND** vancomycin - pharmacy to dose     Review of Systems  Objective:     Weight: (!) 141.3 kg (311 lb 8.2 oz)  Body mass index is 44.7 kg/m².  Vital Signs (Most Recent):  Temp: 98.4 °F (36.9 °C) (12/17/20 0742)  Pulse: (!) 58 (12/17/20 0819)  Resp: 18 (12/17/20 0908)  BP: 133/86 (12/17/20 0742)  SpO2: 97 % (12/17/20 0742) Vital Signs (24h Range):  Temp:  [96.8 °F (36 °C)-98.7 °F (37.1 °C)] 98.4 °F (36.9 °C)  Pulse:  [58-89] 58  Resp:  [17-21] 18  SpO2:  [95 %-97 %] 97 %  BP: (122-136)/(58-86)  133/86                          Closed/Suction Drain 12/08/20 1239 Right Back Accordion 10 Fr. (Active)   Site Description Unable to view 12/13/20 2000   Dressing Type Transparent (Tegaderm) 12/13/20 2000   Dressing Status Clean;Dry;Intact 12/13/20 2000   Dressing Intervention Integrity maintained 12/13/20 2000   Drainage Serosanguineous 12/13/20 2000   Status To bulb suction 12/13/20 2000   Output (mL) 0 mL 12/13/20 1800            Closed/Suction Drain 12/08/20 1239 Left Back Accordion 10 Fr. (Active)   Site Description Unable to view 12/13/20 2000   Dressing Type Transparent (Tegaderm) 12/13/20 2000   Dressing Status Clean;Dry;Intact 12/13/20 2000   Dressing Intervention Integrity maintained 12/13/20 2000   Drainage Serosanguineous 12/13/20 2000   Status To bulb suction 12/13/20 2000   Output (mL) 0 mL 12/13/20 1800       Female External Urinary Catheter 12/09/20 1741 (Active)   Skin no breakdown;perineum cleansed w/ soap and water;female external urine collection device repositioned 12/16/20 2000   Tolerance no signs/symptoms of discomfort 12/16/20 2000   Suction Continuous suction at 70 mmHg 12/16/20 2000   Date of last wick change 12/16/20 12/16/20 2000   Time of last wick change 0800 12/16/20 0800   Output (mL) 300 mL 12/15/20 0800       Neurosurgery Physical Exam    General: well developed, well nourished, no distress.   Head: normocephalic, atraumatic  Neck: No tracheal deviation.  Neurologic: Alert and oriented. Thought content appropriate.  GCS: Motor: 6/Verbal: 5/Eyes: 4 GCS Total: 15  Mental Status: Awake, Alert, Oriented x 4  Language: No aphasia  Speech: No dysarthria  Cranial nerves: face symmetric, tongue midline, CN II-XII grossly intact.   Eyes: pupils equal, round, reactive to light with accomodation, EOMI.   Ears: No drainage.   Pulmonary: normal respirations, no signs of respiratory distress, nasal cannula in place  Abdomen: soft, non-distended, not tender to palpation  Sensory: intact to light  touch throughout  Motor Strength: Moves all extremities spontaneously with good tone.  Full strength upper and lower extremities. BLE ROM limited 2/2 pain and body habitus, R>L. No abnormal movements seen.      Strength   Deltoids Triceps Biceps Wrist Extension Wrist Flexion Hand    Upper: R 5/5 5/5 5/5 5/5 5/5 5/5     L 5/5 5/5 5/5 5/5 5/5 5/5       Iliopsoas Quadriceps Knee  Flexion Tibialis  anterior Gastro- cnemius EHL   Lower: R 5/5 5/5 5/5 5/5 5/5 5/5     L 5/5 5/5 5/5 5/5 5/5 5/5      Vascular: No LE edema.   Skin: Skin is warm, dry and intact.     Thoracic Incision c/d/I with skin edges well approximated with staples. No surrounding erythema or edema. No drainage from incision.       Significant Labs:  Recent Labs   Lab 12/16/20  0337 12/17/20  0500   GLU 90 75    139   K 3.4* 4.1    104   CO2 29 29   BUN 6* 5*   CREATININE 0.8 0.8   CALCIUM 8.9 9.4   MG  --  2.0     Recent Labs   Lab 12/17/20  0500   WBC 7.15   HGB 8.5*   HCT 29.0*   *     No results for input(s): LABPT, INR, APTT in the last 48 hours.  Microbiology Results (last 7 days)     Procedure Component Value Units Date/Time    Culture, Anaerobe [[id number]] Collected: 12/08/20 1139    Order Status: Completed Specimen: Back Updated: 12/15/20 0740     Anaerobic Culture No anaerobes isolated    Narrative:      T9-10 disc space    Culture, Anaerobe [[id number]] Collected: 12/08/20 1139    Order Status: Completed Specimen: Back Updated: 12/15/20 0740     Anaerobic Culture No anaerobes isolated    Narrative:      T9-10 disc space    Aerobic culture [[id number]] Collected: 12/08/20 1139    Order Status: Completed Specimen: Back Updated: 12/12/20 1007     Aerobic Bacterial Culture No growth    Narrative:      T9-10 disc space    Aerobic culture [[id number]] Collected: 12/08/20 1139    Order Status: Completed Specimen: Back Updated: 12/11/20 1047     Aerobic Bacterial Culture No growth    Narrative:      T9-10 disc space    Fungus culture  [[id number]] Collected: 12/08/20 1139    Order Status: Completed Specimen: Back Updated: 12/10/20 1118     Fungus (Mycology) Culture Culture in progress    Narrative:      T9-10 disc space    Fungus culture [[id number]] Collected: 12/08/20 1139    Order Status: Completed Specimen: Back Updated: 12/10/20 1118     Fungus (Mycology) Culture Culture in progress    Narrative:      T9-10 disc space        All pertinent labs from the last 24 hours have been reviewed.    Significant Diagnostics:  I have reviewed and interpreted all pertinent imaging results/findings within the past 24 hours.   Negative

## 2021-05-07 ENCOUNTER — APPOINTMENT (OUTPATIENT)
Dept: LAB | Facility: HOSPITAL | Age: 71
End: 2021-05-07
Attending: INTERNAL MEDICINE
Payer: OTHER GOVERNMENT

## 2021-05-07 DIAGNOSIS — N39.0 URINARY TRACT INFECTION, SITE NOT SPECIFIED: Primary | ICD-10-CM

## 2021-05-07 LAB
BACTERIA #/AREA URNS HPF: ABNORMAL /HPF
BILIRUB UR QL STRIP: NEGATIVE
CLARITY UR: ABNORMAL
COLOR UR: YELLOW
GLUCOSE UR QL STRIP: NEGATIVE
HGB UR QL STRIP: ABNORMAL
HYALINE CASTS #/AREA URNS LPF: 0 /LPF
KETONES UR QL STRIP: NEGATIVE
LEUKOCYTE ESTERASE UR QL STRIP: ABNORMAL
MICROSCOPIC COMMENT: ABNORMAL
NITRITE UR QL STRIP: NEGATIVE
PH UR STRIP: 7 [PH] (ref 5–8)
PROT UR QL STRIP: ABNORMAL
RBC #/AREA URNS HPF: 2 /HPF (ref 0–4)
SP GR UR STRIP: 1.01 (ref 1–1.03)
SQUAMOUS #/AREA URNS HPF: 1 /HPF
URN SPEC COLLECT METH UR: ABNORMAL
UROBILINOGEN UR STRIP-ACNC: NEGATIVE EU/DL
WBC #/AREA URNS HPF: >100 /HPF (ref 0–5)

## 2021-05-07 PROCEDURE — 87077 CULTURE AEROBIC IDENTIFY: CPT | Performed by: INTERNAL MEDICINE

## 2021-05-07 PROCEDURE — 87186 SC STD MICRODIL/AGAR DIL: CPT | Performed by: INTERNAL MEDICINE

## 2021-05-07 PROCEDURE — 87088 URINE BACTERIA CULTURE: CPT | Performed by: INTERNAL MEDICINE

## 2021-05-07 PROCEDURE — 87086 URINE CULTURE/COLONY COUNT: CPT | Performed by: INTERNAL MEDICINE

## 2021-05-07 PROCEDURE — 81000 URINALYSIS NONAUTO W/SCOPE: CPT | Performed by: INTERNAL MEDICINE

## 2021-05-09 LAB — BACTERIA UR CULT: ABNORMAL

## 2021-07-15 ENCOUNTER — HOSPITAL ENCOUNTER (EMERGENCY)
Facility: HOSPITAL | Age: 71
Discharge: HOME | End: 2021-07-16
Attending: FAMILY MEDICINE
Payer: MEDICARE

## 2021-07-15 DIAGNOSIS — R19.7 DIARRHEA OF PRESUMED INFECTIOUS ORIGIN: ICD-10-CM

## 2021-07-15 DIAGNOSIS — K52.9 GASTROENTERITIS: Primary | ICD-10-CM

## 2021-07-15 DIAGNOSIS — E86.0 DEHYDRATION: ICD-10-CM

## 2021-07-15 LAB
ALBUMIN SERPL BCP-MCNC: 3.2 G/DL (ref 3.5–5.2)
ALP SERPL-CCNC: 105 U/L (ref 55–135)
ALT SERPL W/O P-5'-P-CCNC: 22 U/L (ref 10–44)
ANION GAP SERPL CALC-SCNC: 3 MMOL/L (ref 8–16)
AST SERPL-CCNC: 22 U/L (ref 10–40)
BACTERIA #/AREA URNS HPF: ABNORMAL /HPF
BASOPHILS # BLD AUTO: 0.01 K/UL (ref 0–0.2)
BASOPHILS NFR BLD: 0.1 % (ref 0–1.9)
BILIRUB SERPL-MCNC: 0.3 MG/DL (ref 0.1–1)
BILIRUB UR QL STRIP: NEGATIVE
BUN SERPL-MCNC: 21 MG/DL (ref 8–23)
CALCIUM SERPL-MCNC: 9.8 MG/DL (ref 8.7–10.5)
CHLORIDE SERPL-SCNC: 107 MMOL/L (ref 95–110)
CLARITY UR: ABNORMAL
CO2 SERPL-SCNC: 29 MMOL/L (ref 23–29)
COLOR UR: YELLOW
CREAT SERPL-MCNC: 0.8 MG/DL (ref 0.5–1.4)
DIFFERENTIAL METHOD: ABNORMAL
EOSINOPHIL # BLD AUTO: 0 K/UL (ref 0–0.5)
EOSINOPHIL NFR BLD: 0.6 % (ref 0–8)
ERYTHROCYTE [DISTWIDTH] IN BLOOD BY AUTOMATED COUNT: 15.3 % (ref 11.5–14.5)
EST. GFR  (AFRICAN AMERICAN): >60 ML/MIN/1.73 M^2
EST. GFR  (NON AFRICAN AMERICAN): >60 ML/MIN/1.73 M^2
GLUCOSE SERPL-MCNC: 85 MG/DL (ref 70–110)
GLUCOSE UR QL STRIP: NEGATIVE
HCT VFR BLD AUTO: 42 % (ref 37–48.5)
HGB BLD-MCNC: 12.6 G/DL (ref 12–16)
HGB UR QL STRIP: NEGATIVE
HYALINE CASTS #/AREA URNS LPF: 3 /LPF
IMM GRANULOCYTES # BLD AUTO: 0.01 K/UL (ref 0–0.04)
IMM GRANULOCYTES NFR BLD AUTO: 0.1 % (ref 0–0.5)
KETONES UR QL STRIP: NEGATIVE
LEUKOCYTE ESTERASE UR QL STRIP: ABNORMAL
LIPASE SERPL-CCNC: 112 U/L (ref 23–300)
LYMPHOCYTES # BLD AUTO: 1.8 K/UL (ref 1–4.8)
LYMPHOCYTES NFR BLD: 26 % (ref 18–48)
MCH RBC QN AUTO: 27.3 PG (ref 27–31)
MCHC RBC AUTO-ENTMCNC: 30 G/DL (ref 32–36)
MCV RBC AUTO: 91 FL (ref 82–98)
MICROSCOPIC COMMENT: ABNORMAL
MONOCYTES # BLD AUTO: 1.1 K/UL (ref 0.3–1)
MONOCYTES NFR BLD: 15.3 % (ref 4–15)
NEUTROPHILS # BLD AUTO: 4.1 K/UL (ref 1.8–7.7)
NEUTROPHILS NFR BLD: 57.9 % (ref 38–73)
NITRITE UR QL STRIP: NEGATIVE
NRBC BLD-RTO: 0 /100 WBC
PH UR STRIP: 7 [PH] (ref 5–8)
PLATELET # BLD AUTO: 278 K/UL (ref 150–450)
PMV BLD AUTO: 8.9 FL (ref 9.2–12.9)
POTASSIUM SERPL-SCNC: 4 MMOL/L (ref 3.5–5.1)
PROT SERPL-MCNC: 7.2 G/DL (ref 6–8.4)
PROT UR QL STRIP: ABNORMAL
RBC # BLD AUTO: 4.61 M/UL (ref 4–5.4)
RBC #/AREA URNS HPF: 2 /HPF (ref 0–4)
SODIUM SERPL-SCNC: 139 MMOL/L (ref 136–145)
SP GR UR STRIP: 1.02 (ref 1–1.03)
SQUAMOUS #/AREA URNS HPF: 1 /HPF
URN SPEC COLLECT METH UR: ABNORMAL
UROBILINOGEN UR STRIP-ACNC: 1 EU/DL
WBC # BLD AUTO: 7.04 K/UL (ref 3.9–12.7)
WBC #/AREA URNS HPF: >100 /HPF (ref 0–5)

## 2021-07-15 PROCEDURE — 36000 PLACE NEEDLE IN VEIN: CPT

## 2021-07-15 PROCEDURE — 80053 COMPREHEN METABOLIC PANEL: CPT | Performed by: FAMILY MEDICINE

## 2021-07-15 PROCEDURE — 36415 COLL VENOUS BLD VENIPUNCTURE: CPT | Performed by: FAMILY MEDICINE

## 2021-07-15 PROCEDURE — 87077 CULTURE AEROBIC IDENTIFY: CPT | Performed by: FAMILY MEDICINE

## 2021-07-15 PROCEDURE — 87186 SC STD MICRODIL/AGAR DIL: CPT | Performed by: FAMILY MEDICINE

## 2021-07-15 PROCEDURE — 87088 URINE BACTERIA CULTURE: CPT | Performed by: FAMILY MEDICINE

## 2021-07-15 PROCEDURE — 87086 URINE CULTURE/COLONY COUNT: CPT | Performed by: FAMILY MEDICINE

## 2021-07-15 PROCEDURE — 81000 URINALYSIS NONAUTO W/SCOPE: CPT | Performed by: FAMILY MEDICINE

## 2021-07-15 PROCEDURE — 85025 COMPLETE CBC W/AUTO DIFF WBC: CPT | Performed by: FAMILY MEDICINE

## 2021-07-15 PROCEDURE — 83690 ASSAY OF LIPASE: CPT | Performed by: FAMILY MEDICINE

## 2021-07-15 PROCEDURE — 96374 THER/PROPH/DIAG INJ IV PUSH: CPT

## 2021-07-15 PROCEDURE — P9612 CATHETERIZE FOR URINE SPEC: HCPCS

## 2021-07-15 PROCEDURE — 99284 EMERGENCY DEPT VISIT MOD MDM: CPT | Mod: 25

## 2021-07-15 RX ORDER — DIPHENOXYLATE HYDROCHLORIDE AND ATROPINE SULFATE 2.5; .025 MG/1; MG/1
1 TABLET ORAL
Status: COMPLETED | OUTPATIENT
Start: 2021-07-15 | End: 2021-07-16

## 2021-07-15 RX ORDER — ONDANSETRON 2 MG/ML
4 INJECTION INTRAMUSCULAR; INTRAVENOUS
Status: COMPLETED | OUTPATIENT
Start: 2021-07-15 | End: 2021-07-16

## 2021-07-16 VITALS
BODY MASS INDEX: 40.75 KG/M2 | DIASTOLIC BLOOD PRESSURE: 67 MMHG | RESPIRATION RATE: 18 BRPM | WEIGHT: 284 LBS | OXYGEN SATURATION: 97 % | SYSTOLIC BLOOD PRESSURE: 144 MMHG | HEART RATE: 74 BPM | TEMPERATURE: 98 F

## 2021-07-16 PROCEDURE — 63600175 PHARM REV CODE 636 W HCPCS: Performed by: FAMILY MEDICINE

## 2021-07-16 PROCEDURE — 25000003 PHARM REV CODE 250: Performed by: FAMILY MEDICINE

## 2021-07-16 RX ORDER — CIPROFLOXACIN 500 MG/1
500 TABLET ORAL 2 TIMES DAILY
Qty: 20 TABLET | Refills: 0 | Status: SHIPPED | OUTPATIENT
Start: 2021-07-16 | End: 2021-07-26

## 2021-07-16 RX ADMIN — ONDANSETRON 4 MG: 2 INJECTION INTRAMUSCULAR; INTRAVENOUS at 12:07

## 2021-07-16 RX ADMIN — SODIUM CHLORIDE 1000 ML: 0.9 INJECTION, SOLUTION INTRAVENOUS at 12:07

## 2021-07-16 RX ADMIN — DIPHENOXYLATE HYDROCHLORIDE AND ATROPINE SULFATE 1 TABLET: 2.5; .025 TABLET ORAL at 12:07

## 2021-07-18 LAB — BACTERIA UR CULT: ABNORMAL

## 2021-07-22 NOTE — BRIEF OP NOTE
[facility]  Brief Operative Note    SUMMARY     Surgery Date: 12/8/2020     Surgeon(s) and Role:     * Everardo Gongora MD - Primary     * Luis E Monteiro MD - Assisting     * Imer Mckeon MD - Resident - Assisting        Pre-op Diagnosis:  Thoracic spine fracture [S22.009A]  MRSA bacteremia [R78.81, B95.62]    Post-op Diagnosis:  Post-Op Diagnosis Codes:     * Thoracic spine fracture [S22.009A]     * MRSA bacteremia [R78.81, B95.62]    Procedure(s) (LRB):  T9-T10 corpectomy, posterior instrumented fusion T7-T12. (N/A)    Anesthesia: General    Description of Procedure: T9-10 corpectomy, T7-T12 instrumented fusion    Description of the findings of the procedure: Exposed from T7 to T12, placed screws at T7, T8, T11, and T12 bilaterally.  Performed T9-T10 laminectomies, then removed rib heads at T9 and T10, temporary occlusion test of the right T9 and T10 nerve roots, then performed rhizotomies of those nerve roots.  Performed corpectomy and placed cage.  Then placed rods.  Closed in layers with staples for skin and pravena incisional wound vac for dressing.    Estimated Blood Loss: 400 mL    Estimated Blood Loss has been documented.         Specimens:   Specimen (12h ago, onward)    None          [id number]     n/a

## 2021-09-03 ENCOUNTER — PATIENT MESSAGE (OUTPATIENT)
Dept: NEUROSURGERY | Facility: CLINIC | Age: 71
End: 2021-09-03

## 2021-09-08 ENCOUNTER — APPOINTMENT (OUTPATIENT)
Dept: LAB | Facility: HOSPITAL | Age: 71
End: 2021-09-08
Attending: INTERNAL MEDICINE
Payer: MEDICARE

## 2021-09-08 DIAGNOSIS — N39.0 URINARY TRACT INFECTION WITHOUT HEMATURIA, SITE UNSPECIFIED: Primary | ICD-10-CM

## 2021-09-08 DIAGNOSIS — Z87.440 PERSONAL HISTORY OF URINARY (TRACT) INFECTION: ICD-10-CM

## 2021-09-08 LAB
BACTERIA #/AREA URNS HPF: ABNORMAL /HPF
BILIRUB UR QL STRIP: NEGATIVE
CLARITY UR: ABNORMAL
COLOR UR: YELLOW
GLUCOSE UR QL STRIP: NEGATIVE
HGB UR QL STRIP: NEGATIVE
HYALINE CASTS #/AREA URNS LPF: 0 /LPF
KETONES UR QL STRIP: NEGATIVE
LEUKOCYTE ESTERASE UR QL STRIP: ABNORMAL
MICROSCOPIC COMMENT: ABNORMAL
NITRITE UR QL STRIP: POSITIVE
PH UR STRIP: 8 [PH] (ref 5–8)
PROT UR QL STRIP: NEGATIVE
RBC #/AREA URNS HPF: 1 /HPF (ref 0–4)
SP GR UR STRIP: 1.02 (ref 1–1.03)
SQUAMOUS #/AREA URNS HPF: 0 /HPF
URN SPEC COLLECT METH UR: ABNORMAL
UROBILINOGEN UR STRIP-ACNC: 1 EU/DL
WBC #/AREA URNS HPF: >100 /HPF (ref 0–5)

## 2021-09-08 PROCEDURE — 81000 URINALYSIS NONAUTO W/SCOPE: CPT | Performed by: INTERNAL MEDICINE

## 2021-09-08 PROCEDURE — 87077 CULTURE AEROBIC IDENTIFY: CPT | Mod: 59 | Performed by: INTERNAL MEDICINE

## 2021-09-08 PROCEDURE — 87086 URINE CULTURE/COLONY COUNT: CPT | Performed by: INTERNAL MEDICINE

## 2021-09-08 PROCEDURE — 87088 URINE BACTERIA CULTURE: CPT | Performed by: INTERNAL MEDICINE

## 2021-09-08 PROCEDURE — 87186 SC STD MICRODIL/AGAR DIL: CPT | Performed by: INTERNAL MEDICINE

## 2021-09-12 LAB — BACTERIA UR CULT: ABNORMAL

## 2021-12-22 NOTE — PLAN OF CARE
Problem: Physical Therapy Goal  Goal: Physical Therapy Goal  Description: Goals to be met by:      Patient will increase functional independence with mobility by performin. Supine to sit with Stand-by Assistance  2. Sit to supine with Stand-by Assistance  3. Rolling to Left and Right with Stand-by Assistance.  4. Sitting at edge of bed x20 minutes with Modified [place]  5. Lower extremity exercise program x20 reps per handout, with supervision    Standing TBA once WB status clarified after Ortho consult    Outcome: Ongoing, Progressing   Sup to sit with MIN a with RLE. Sat EOB with good balance x 30 min for LE therex and to eat lunch. Sit to sup with MIN A with BLE lift, max x 3 to scoot to HOB. Pt tolerated BLE TE supine and sitting 2x10 with vc.   1751

## 2022-03-14 ENCOUNTER — HOSPITAL ENCOUNTER (EMERGENCY)
Facility: HOSPITAL | Age: 72
Discharge: HOME | End: 2022-03-14
Attending: EMERGENCY MEDICINE
Payer: MEDICARE

## 2022-03-14 VITALS
HEIGHT: 69 IN | RESPIRATION RATE: 20 BRPM | DIASTOLIC BLOOD PRESSURE: 81 MMHG | BODY MASS INDEX: 41.94 KG/M2 | OXYGEN SATURATION: 98 % | HEART RATE: 80 BPM | SYSTOLIC BLOOD PRESSURE: 125 MMHG | TEMPERATURE: 98 F

## 2022-03-14 DIAGNOSIS — R06.02 SHORTNESS OF BREATH: ICD-10-CM

## 2022-03-14 DIAGNOSIS — J44.1 COPD EXACERBATION: ICD-10-CM

## 2022-03-14 DIAGNOSIS — N39.0 URINARY TRACT INFECTION WITHOUT HEMATURIA, SITE UNSPECIFIED: Primary | ICD-10-CM

## 2022-03-14 LAB
ALBUMIN SERPL BCP-MCNC: 3 G/DL (ref 3.5–5.2)
ALP SERPL-CCNC: 115 U/L (ref 55–135)
ALT SERPL W/O P-5'-P-CCNC: 14 U/L (ref 10–44)
ANION GAP SERPL CALC-SCNC: 0 MMOL/L (ref 8–16)
AST SERPL-CCNC: 28 U/L (ref 10–40)
BACTERIA #/AREA URNS HPF: ABNORMAL /HPF
BASOPHILS # BLD AUTO: 0.02 K/UL (ref 0–0.2)
BASOPHILS NFR BLD: 0.3 % (ref 0–1.9)
BILIRUB SERPL-MCNC: 0.4 MG/DL (ref 0.1–1)
BILIRUB UR QL STRIP: NEGATIVE
BUN SERPL-MCNC: 19 MG/DL (ref 8–23)
CALCIUM SERPL-MCNC: 9.5 MG/DL (ref 8.7–10.5)
CHLORIDE SERPL-SCNC: 101 MMOL/L (ref 95–110)
CLARITY UR: ABNORMAL
CO2 SERPL-SCNC: 39 MMOL/L (ref 23–29)
COLOR UR: YELLOW
CREAT SERPL-MCNC: 0.9 MG/DL (ref 0.5–1.4)
CTP QC/QA: YES
CTP QC/QA: YES
DIFFERENTIAL METHOD: ABNORMAL
EOSINOPHIL # BLD AUTO: 0.2 K/UL (ref 0–0.5)
EOSINOPHIL NFR BLD: 2 % (ref 0–8)
ERYTHROCYTE [DISTWIDTH] IN BLOOD BY AUTOMATED COUNT: 14.8 % (ref 11.5–14.5)
EST. GFR  (AFRICAN AMERICAN): >60 ML/MIN/1.73 M^2
EST. GFR  (NON AFRICAN AMERICAN): >60 ML/MIN/1.73 M^2
GLUCOSE SERPL-MCNC: 87 MG/DL (ref 70–110)
GLUCOSE UR QL STRIP: NEGATIVE
HCT VFR BLD AUTO: 46.1 % (ref 37–48.5)
HGB BLD-MCNC: 13.9 G/DL (ref 12–16)
HGB UR QL STRIP: ABNORMAL
HYALINE CASTS #/AREA URNS LPF: 11 /LPF
IMM GRANULOCYTES # BLD AUTO: 0.01 K/UL (ref 0–0.04)
IMM GRANULOCYTES NFR BLD AUTO: 0.1 % (ref 0–0.5)
KETONES UR QL STRIP: NEGATIVE
LEUKOCYTE ESTERASE UR QL STRIP: ABNORMAL
LYMPHOCYTES # BLD AUTO: 2.2 K/UL (ref 1–4.8)
LYMPHOCYTES NFR BLD: 29.3 % (ref 18–48)
MCH RBC QN AUTO: 28.5 PG (ref 27–31)
MCHC RBC AUTO-ENTMCNC: 30.2 G/DL (ref 32–36)
MCV RBC AUTO: 95 FL (ref 82–98)
MICROSCOPIC COMMENT: ABNORMAL
MONOCYTES # BLD AUTO: 0.9 K/UL (ref 0.3–1)
MONOCYTES NFR BLD: 12.4 % (ref 4–15)
NEUTROPHILS # BLD AUTO: 4.2 K/UL (ref 1.8–7.7)
NEUTROPHILS NFR BLD: 55.9 % (ref 38–73)
NITRITE UR QL STRIP: POSITIVE
NRBC BLD-RTO: 0 /100 WBC
NT-PROBNP SERPL-MCNC: 299 PG/ML (ref 5–900)
PH UR STRIP: 8 [PH] (ref 5–8)
PLATELET # BLD AUTO: 186 K/UL (ref 150–450)
PMV BLD AUTO: 10.1 FL (ref 9.2–12.9)
POC MOLECULAR INFLUENZA A AGN: NEGATIVE
POC MOLECULAR INFLUENZA B AGN: NEGATIVE
POTASSIUM SERPL-SCNC: 5.1 MMOL/L (ref 3.5–5.1)
PROT SERPL-MCNC: 7.1 G/DL (ref 6–8.4)
PROT UR QL STRIP: NEGATIVE
RBC # BLD AUTO: 4.87 M/UL (ref 4–5.4)
RBC #/AREA URNS HPF: 2 /HPF (ref 0–4)
SARS-COV-2 RDRP RESP QL NAA+PROBE: NEGATIVE
SODIUM SERPL-SCNC: 140 MMOL/L (ref 136–145)
SP GR UR STRIP: 1.01 (ref 1–1.03)
SQUAMOUS #/AREA URNS HPF: 2 /HPF
TROPONIN I SERPL DL<=0.01 NG/ML-MCNC: 12.8 PG/ML (ref 0–60)
URN SPEC COLLECT METH UR: ABNORMAL
UROBILINOGEN UR STRIP-ACNC: NEGATIVE EU/DL
WBC # BLD AUTO: 7.5 K/UL (ref 3.9–12.7)
WBC #/AREA URNS HPF: >100 /HPF (ref 0–5)

## 2022-03-14 PROCEDURE — 87088 URINE BACTERIA CULTURE: CPT | Performed by: EMERGENCY MEDICINE

## 2022-03-14 PROCEDURE — 94761 N-INVAS EAR/PLS OXIMETRY MLT: CPT

## 2022-03-14 PROCEDURE — 93010 EKG 12-LEAD: ICD-10-PCS | Mod: ,,, | Performed by: INTERNAL MEDICINE

## 2022-03-14 PROCEDURE — 96375 TX/PRO/DX INJ NEW DRUG ADDON: CPT

## 2022-03-14 PROCEDURE — U0002 COVID-19 LAB TEST NON-CDC: HCPCS | Performed by: EMERGENCY MEDICINE

## 2022-03-14 PROCEDURE — 80053 COMPREHEN METABOLIC PANEL: CPT | Performed by: EMERGENCY MEDICINE

## 2022-03-14 PROCEDURE — 84484 ASSAY OF TROPONIN QUANT: CPT | Performed by: EMERGENCY MEDICINE

## 2022-03-14 PROCEDURE — 96365 THER/PROPH/DIAG IV INF INIT: CPT

## 2022-03-14 PROCEDURE — 99285 EMERGENCY DEPT VISIT HI MDM: CPT | Mod: 25

## 2022-03-14 PROCEDURE — 94640 AIRWAY INHALATION TREATMENT: CPT

## 2022-03-14 PROCEDURE — P9612 CATHETERIZE FOR URINE SPEC: HCPCS

## 2022-03-14 PROCEDURE — 63600175 PHARM REV CODE 636 W HCPCS: Performed by: EMERGENCY MEDICINE

## 2022-03-14 PROCEDURE — 99900035 HC TECH TIME PER 15 MIN (STAT)

## 2022-03-14 PROCEDURE — 93005 ELECTROCARDIOGRAM TRACING: CPT

## 2022-03-14 PROCEDURE — 27000221 HC OXYGEN, UP TO 24 HOURS

## 2022-03-14 PROCEDURE — 36415 COLL VENOUS BLD VENIPUNCTURE: CPT | Performed by: EMERGENCY MEDICINE

## 2022-03-14 PROCEDURE — 87086 URINE CULTURE/COLONY COUNT: CPT | Performed by: EMERGENCY MEDICINE

## 2022-03-14 PROCEDURE — 87077 CULTURE AEROBIC IDENTIFY: CPT | Performed by: EMERGENCY MEDICINE

## 2022-03-14 PROCEDURE — 83880 ASSAY OF NATRIURETIC PEPTIDE: CPT | Performed by: EMERGENCY MEDICINE

## 2022-03-14 PROCEDURE — 25000242 PHARM REV CODE 250 ALT 637 W/ HCPCS: Performed by: EMERGENCY MEDICINE

## 2022-03-14 PROCEDURE — 93010 ELECTROCARDIOGRAM REPORT: CPT | Mod: ,,, | Performed by: INTERNAL MEDICINE

## 2022-03-14 PROCEDURE — 99900031 HC PATIENT EDUCATION (STAT)

## 2022-03-14 PROCEDURE — 81000 URINALYSIS NONAUTO W/SCOPE: CPT | Performed by: EMERGENCY MEDICINE

## 2022-03-14 PROCEDURE — 87186 SC STD MICRODIL/AGAR DIL: CPT | Performed by: EMERGENCY MEDICINE

## 2022-03-14 PROCEDURE — 85025 COMPLETE CBC W/AUTO DIFF WBC: CPT | Performed by: EMERGENCY MEDICINE

## 2022-03-14 RX ORDER — LEVOFLOXACIN 500 MG/1
500 TABLET, FILM COATED ORAL DAILY
Qty: 5 TABLET | Refills: 0 | Status: SHIPPED | OUTPATIENT
Start: 2022-03-14 | End: 2022-03-19

## 2022-03-14 RX ORDER — FUROSEMIDE 10 MG/ML
20 INJECTION INTRAMUSCULAR; INTRAVENOUS
Status: COMPLETED | OUTPATIENT
Start: 2022-03-14 | End: 2022-03-14

## 2022-03-14 RX ORDER — METHYLPREDNISOLONE SOD SUCC 125 MG
125 VIAL (EA) INJECTION
Status: COMPLETED | OUTPATIENT
Start: 2022-03-14 | End: 2022-03-14

## 2022-03-14 RX ORDER — PREDNISONE 20 MG/1
40 TABLET ORAL DAILY
Qty: 10 TABLET | Refills: 0 | Status: SHIPPED | OUTPATIENT
Start: 2022-03-14 | End: 2022-03-19

## 2022-03-14 RX ORDER — IPRATROPIUM BROMIDE AND ALBUTEROL SULFATE 2.5; .5 MG/3ML; MG/3ML
3 SOLUTION RESPIRATORY (INHALATION)
Status: COMPLETED | OUTPATIENT
Start: 2022-03-14 | End: 2022-03-14

## 2022-03-14 RX ADMIN — METHYLPREDNISOLONE SODIUM SUCCINATE 125 MG: 125 INJECTION, POWDER, FOR SOLUTION INTRAMUSCULAR; INTRAVENOUS at 10:03

## 2022-03-14 RX ADMIN — FUROSEMIDE 20 MG: 10 INJECTION, SOLUTION INTRAMUSCULAR; INTRAVENOUS at 10:03

## 2022-03-14 RX ADMIN — IPRATROPIUM BROMIDE AND ALBUTEROL SULFATE 3 ML: 2.5; .5 SOLUTION RESPIRATORY (INHALATION) at 10:03

## 2022-03-14 RX ADMIN — CEFTRIAXONE 1 G: 1 INJECTION, SOLUTION INTRAVENOUS at 12:03

## 2022-03-14 NOTE — ED PROVIDER NOTES
Encounter Date: 3/14/2022       History     Chief Complaint   Patient presents with    Shortness of Breath     Pt to ER via [facility] for labs.  Pt states she thought it best she check in for SOB x3 days.  Pt suspects she has a kidney infection.     70 yo female with dementia, HTN, chronic respiratory failure on home O2 chronically initially presents to the hospital via [facility] for outpatient labs but decided while she was here to be evaluated in the ED for SOB x 3 days. No fever. Non productive cough. No CP. No orthopnea. No PND. Similar to previous. No known sick contacts. Also reports possible UTI due to dysuria.         Review of patient's allergies indicates:   Allergen Reactions    Tizanidine      Past Medical History:   Diagnosis Date    Alzheimer disease     Alzheimer disease     Coronary artery disease     Hyperlipidemia     Hypertension     Myopathy     Non-ST elevation (NSTEMI) myocardial infarction     Obesity     Pneumonia     Sleep apnea      Past Surgical History:   Procedure Laterality Date    APPENDECTOMY      APPENDECTOMY      CHOLECYSTECTOMY      CORONARY STENT PLACEMENT      HYSTERECTOMY      SURGICAL REMOVAL OF VERTEBRAL BODY OF THORACIC SPINE N/A 2020    Procedure: T9-T10 corpectomy, posterior instrumented fusion T7-T12.;  Surgeon: Everardo Gongora MD;  Location: [facility] OR [address] [place] [address];  Service: Neurosurgery;  Laterality: N/A;    TONSILLECTOMY       Family History   Problem Relation Age of Onset    Stroke Mother     Hypertension Father      Social History     Tobacco Use    Smoking status: Former Smoker     Types: Cigarettes     Quit date: 2000     Years since quittin.2    Smokeless tobacco: Never Used   Substance Use Topics    Alcohol use: Not Currently    Drug use: Never     Review of Systems   Constitutional: Negative.    HENT: Negative.    Respiratory: Positive for cough and shortness of breath.    Cardiovascular: Negative.    Gastrointestinal: Negative.     Genitourinary: Positive for dysuria.   All other systems reviewed and are negative.      Physical Exam     Initial Vitals [03/14/22 0826]   BP Pulse Resp Temp SpO2   (!) 162/82 66 (!) 32 97.9 °F (36.6 °C) (!) 93 %      MAP       --         Physical Exam    Nursing note and vitals reviewed.  Constitutional: She appears well-developed and well-nourished. She is not diaphoretic. No distress.   HENT:   Head: Normocephalic and atraumatic.   Eyes: EOM are normal. Pupils are equal, round, and reactive to light.   Neck: Neck supple.   Normal range of motion.  Cardiovascular: Normal rate, regular rhythm and intact distal pulses.   Pulmonary/Chest: No respiratory distress. She has no wheezes. She has rhonchi. She has no rales.   Abdominal: Abdomen is soft. Bowel sounds are normal. She exhibits no distension. There is no abdominal tenderness. There is no rebound.   Musculoskeletal:         General: No tenderness or edema. Normal range of motion.      Cervical back: Normal range of motion and neck supple.     Neurological: She is alert and oriented to person, place, and time. She has normal strength and normal reflexes. No sensory deficit. GCS score is 15. GCS eye subscore is 4. GCS verbal subscore is 5. GCS motor subscore is 6.   Skin: Skin is warm and dry. Capillary refill takes less than 2 seconds. No rash noted.         ED Course   Procedures  Labs Reviewed   CBC W/ AUTO DIFFERENTIAL - Abnormal; Notable for the following components:       Result Value    MCHC 30.2 (*)     RDW 14.8 (*)     All other components within normal limits   COMPREHENSIVE METABOLIC PANEL - Abnormal; Notable for the following components:    CO2 39 (*)     Albumin 3.0 (*)     Anion Gap 0 (*)     All other components within normal limits   URINALYSIS, REFLEX TO URINE CULTURE - Abnormal; Notable for the following components:    Appearance, UA Cloudy (*)     Occult Blood UA Trace (*)     Nitrite, UA Positive (*)     Leukocytes, UA 3+ (*)     All other  components within normal limits    Narrative:     Preferred Collection Type->Urine, Clean Catch  Specimen Source->Urine   URINALYSIS MICROSCOPIC - Abnormal; Notable for the following components:    WBC, UA >100 (*)     Bacteria Few (*)     Hyaline Casts, UA 11 (*)     All other components within normal limits    Narrative:     Preferred Collection Type->Urine, Clean Catch  Specimen Source->Urine   CULTURE, URINE   TROPONIN I HIGH SENSITIVITY   NT-PRO NATRIURETIC PEPTIDE   SARS-COV-2 RDRP GENE    Narrative:     This test utilizes isothermal nucleic acid amplification   technology to detect the SARS-CoV-2 RdRp nucleic acid segment.   The analytical sensitivity (limit of detection) is 125 genome   equivalents/mL.   A POSITIVE result implies infection with the SARS-CoV-2 virus;   the patient is presumed to be contagious.     A NEGATIVE result means that SARS-CoV-2 nucleic acids are not   present above the limit of detection. A NEGATIVE result should be   treated as presumptive. It does not rule out the possibility of   COVID-19 and should not be the sole basis for treatment decisions.   If COVID-19 is strongly suspected based on clinical and exposure   history, re-testing using an alternate molecular assay should be   considered.   This test is only for use under the Food and Drug   Administration s Emergency Use Authorization (EUA).   Commercial kits are provided by [employer].   Performance characteristics of the EUA have been independently   verified by [facility] Department of   Pathology and Laboratory Medicine.   _________________________________________________________________   The authorized Fact Sheet for Healthcare Providers and the authorized Fact   Sheet for Patients of the ID NOW COVID-19 are available on the FDA   website:     https://www.fda.gov/media/232454/download  https://www.fda.gov/media/555953/download           POCT INFLUENZA A/B MOLECULAR     EKG Readings: (Independently  Interpreted)   Initial Reading: No STEMI. Rhythm: Normal Sinus Rhythm. Heart Rate: 66. Ectopy: No Ectopy. Clinical Impression: Normal Sinus Rhythm     ECG Results          EKG 12-lead (Final result)  Result time 03/14/22 09:15:41    Final result by Interface, Lab In [facility] (03/14/22 09:15:41)                 Narrative:    Test Reason : R06.02,    Vent. Rate : 066 BPM     Atrial Rate : 066 BPM     P-R Int : 174 ms          QRS Dur : 080 ms      QT Int : 414 ms       P-R-T Axes : 063 032 071 degrees     QTc Int : 434 ms    Baseline Artifact  Normal sinus rhythm  Normal ECG  When compared with ECG of 23-SEP-2020 13:12,  No significant change was found  Confirmed by Wilber Cowan MD ([id number]) on 3/14/2022 9:15:33 AM    Referred By: ABRIL   SELF           Confirmed By:Wilber Cowan MD                            Imaging Results          X-Ray Chest AP Portable (Final result)  Result time 03/14/22 09:49:16    Final result by Deangelo Summers MD (03/14/22 09:49:16)                 Impression:      Chronic lung changes.  No acute findings.      Electronically signed by: Deangelo Summers MD  Date:    03/14/2022  Time:    09:49             Narrative:    EXAMINATION:  XR CHEST AP PORTABLE    CLINICAL HISTORY:  shortness of breath;    COMPARISON:  Chest x-ray 10/07/2020.    FINDINGS:  Prominent AP cardiac silhouette.  Stable chronic reticular lung changes.  No acute consolidation or pleural fluid.  Spinal Terrazas rods present.                              X-Rays:   Independently Interpreted Readings:   Chest X-Ray: No acute abnormalities.     Medications   albuterol-ipratropium 2.5 mg-0.5 mg/3 mL nebulizer solution 3 mL (3 mLs Nebulization Given 3/14/22 1038)   methylPREDNISolone sodium succinate injection 125 mg (125 mg Intravenous Given 3/14/22 1039)   furosemide injection 20 mg (20 mg Intravenous Given 3/14/22 1039)   cefTRIAXone (ROCEPHIN) 1 g/50 mL D5W IVPB (1 g Intravenous New Bag 3/14/22 1214)     Medical Decision  Making:   Clinical Tests:   Lab Tests: Reviewed and Ordered  Radiological Study: Reviewed and Ordered  Medical Tests: Reviewed and Ordered                      Clinical Impression:   Final diagnoses:  [R06.02] Shortness of breath  [N39.0] Urinary tract infection without hematuria, site unspecified (Primary)  [J44.1] COPD exacerbation          ED Disposition Condition    Discharge Stable        ED Prescriptions     Medication Sig Dispense Start Date End Date Auth. Provider    predniSONE (DELTASONE) 20 MG tablet Take 2 tablets (40 mg total) by mouth once daily. for 5 days 10 tablet 3/14/2022 3/19/2022 Caleb Gonsales MD    levoFLOXacin (LEVAQUIN) 500 MG tablet Take 1 tablet (500 mg total) by mouth once daily. for 5 days 5 tablet 3/14/2022 3/19/2022 Caleb Gonsales MD        Follow-up Information     Follow up With Specialties Details Why Contact Info    Joe Fuller III, MD Internal Medicine Schedule an appointment as soon as possible for a visit   [address] [place] [id number]  [phone]             Caleb Gonsales MD  03/14/22 6939

## 2022-03-17 LAB — BACTERIA UR CULT: ABNORMAL

## 2022-04-06 ENCOUNTER — HOSPITAL ENCOUNTER (INPATIENT)
Facility: HOSPITAL | Age: 72
LOS: 6 days | Discharge: HOME HEALTH | DRG: 871 | End: 2022-04-12
Attending: EMERGENCY MEDICINE | Admitting: INTERNAL MEDICINE
Payer: MEDICARE

## 2022-04-06 DIAGNOSIS — R41.82 ALTERED MENTAL STATUS, UNSPECIFIED ALTERED MENTAL STATUS TYPE: ICD-10-CM

## 2022-04-06 DIAGNOSIS — J96.22 ACUTE ON CHRONIC RESPIRATORY FAILURE WITH HYPOXIA AND HYPERCAPNIA: Primary | ICD-10-CM

## 2022-04-06 DIAGNOSIS — N39.0 URINARY TRACT INFECTION WITHOUT HEMATURIA, SITE UNSPECIFIED: ICD-10-CM

## 2022-04-06 DIAGNOSIS — J96.21 ACUTE ON CHRONIC RESPIRATORY FAILURE WITH HYPOXIA AND HYPERCAPNIA: Primary | ICD-10-CM

## 2022-04-06 DIAGNOSIS — A41.9 SEPSIS: ICD-10-CM

## 2022-04-06 DIAGNOSIS — I21.4 NSTEMI (NON-ST ELEVATED MYOCARDIAL INFARCTION): ICD-10-CM

## 2022-04-06 DIAGNOSIS — M46.44 DISCITIS OF THORACIC REGION: ICD-10-CM

## 2022-04-06 LAB
ALBUMIN SERPL BCP-MCNC: 3 G/DL (ref 3.5–5.2)
ALP SERPL-CCNC: 123 U/L (ref 55–135)
ALT SERPL W/O P-5'-P-CCNC: 13 U/L (ref 10–44)
ANION GAP SERPL CALC-SCNC: 2 MMOL/L (ref 8–16)
AST SERPL-CCNC: 6 U/L (ref 10–40)
BACTERIA #/AREA URNS HPF: ABNORMAL /HPF
BASOPHILS # BLD AUTO: 0.02 K/UL (ref 0–0.2)
BASOPHILS NFR BLD: 0.3 % (ref 0–1.9)
BILIRUB SERPL-MCNC: 0.3 MG/DL (ref 0.1–1)
BILIRUB UR QL STRIP: NEGATIVE
BUN SERPL-MCNC: 11 MG/DL (ref 8–23)
CALCIUM SERPL-MCNC: 8.9 MG/DL (ref 8.7–10.5)
CHLORIDE SERPL-SCNC: 100 MMOL/L (ref 95–110)
CLARITY UR: ABNORMAL
CO2 SERPL-SCNC: 38 MMOL/L (ref 23–29)
COLOR UR: YELLOW
CREAT SERPL-MCNC: 1 MG/DL (ref 0.5–1.4)
CTP QC/QA: YES
CTP QC/QA: YES
DIFFERENTIAL METHOD: ABNORMAL
EOSINOPHIL # BLD AUTO: 0 K/UL (ref 0–0.5)
EOSINOPHIL NFR BLD: 0.5 % (ref 0–8)
EPITH CASTS #/AREA URNS LPF: 0 /LPF
ERYTHROCYTE [DISTWIDTH] IN BLOOD BY AUTOMATED COUNT: 15.3 % (ref 11.5–14.5)
EST. GFR  (AFRICAN AMERICAN): >60 ML/MIN/1.73 M^2
EST. GFR  (NON AFRICAN AMERICAN): 56 ML/MIN/1.73 M^2
GLUCOSE SERPL-MCNC: 142 MG/DL (ref 70–110)
GLUCOSE UR QL STRIP: NEGATIVE
HCT VFR BLD AUTO: 47 % (ref 37–48.5)
HGB BLD-MCNC: 13.7 G/DL (ref 12–16)
HGB UR QL STRIP: NEGATIVE
HYALINE CASTS #/AREA URNS LPF: 36.57 /LPF
IMM GRANULOCYTES # BLD AUTO: 0.01 K/UL (ref 0–0.04)
IMM GRANULOCYTES NFR BLD AUTO: 0.1 % (ref 0–0.5)
KETONES UR QL STRIP: ABNORMAL
LACTATE SERPL-SCNC: 1 MMOL/L (ref 0.5–2.2)
LEUKOCYTE ESTERASE UR QL STRIP: ABNORMAL
LYMPHOCYTES # BLD AUTO: 1.5 K/UL (ref 1–4.8)
LYMPHOCYTES NFR BLD: 19.7 % (ref 18–48)
MAGNESIUM SERPL-MCNC: 2.1 MG/DL (ref 1.6–2.6)
MCH RBC QN AUTO: 28.6 PG (ref 27–31)
MCHC RBC AUTO-ENTMCNC: 29.1 G/DL (ref 32–36)
MCV RBC AUTO: 98 FL (ref 82–98)
MICROSCOPIC COMMENT: ABNORMAL
MONOCYTES # BLD AUTO: 1 K/UL (ref 0.3–1)
MONOCYTES NFR BLD: 12.5 % (ref 4–15)
NEUTROPHILS # BLD AUTO: 5.2 K/UL (ref 1.8–7.7)
NEUTROPHILS NFR BLD: 66.9 % (ref 38–73)
NITRITE UR QL STRIP: NEGATIVE
NRBC BLD-RTO: 0 /100 WBC
NT-PROBNP SERPL-MCNC: 482 PG/ML (ref 5–900)
PH UR STRIP: 5 [PH] (ref 5–8)
PLATELET # BLD AUTO: 216 K/UL (ref 150–450)
PMV BLD AUTO: 9.6 FL (ref 9.2–12.9)
POC MOLECULAR INFLUENZA A AGN: NEGATIVE
POC MOLECULAR INFLUENZA B AGN: NEGATIVE
POTASSIUM SERPL-SCNC: 4.2 MMOL/L (ref 3.5–5.1)
PROT SERPL-MCNC: 6.7 G/DL (ref 6–8.4)
PROT UR QL STRIP: NEGATIVE
RBC # BLD AUTO: 4.79 M/UL (ref 4–5.4)
RBC #/AREA URNS HPF: 2 /HPF (ref 0–4)
SARS-COV-2 RDRP RESP QL NAA+PROBE: NEGATIVE
SODIUM SERPL-SCNC: 140 MMOL/L (ref 136–145)
SP GR UR STRIP: 1.02 (ref 1–1.03)
SQUAMOUS #/AREA URNS HPF: 6 /HPF
TROPONIN I SERPL DL<=0.01 NG/ML-MCNC: 19 PG/ML (ref 0–60)
URN SPEC COLLECT METH UR: ABNORMAL
UROBILINOGEN UR STRIP-ACNC: 1 EU/DL
WBC # BLD AUTO: 7.81 K/UL (ref 3.9–12.7)
WBC #/AREA URNS HPF: >100 /HPF (ref 0–5)
YEAST URNS QL MICRO: ABNORMAL

## 2022-04-06 PROCEDURE — U0002 COVID-19 LAB TEST NON-CDC: HCPCS | Performed by: EMERGENCY MEDICINE

## 2022-04-06 PROCEDURE — 80053 COMPREHEN METABOLIC PANEL: CPT | Performed by: EMERGENCY MEDICINE

## 2022-04-06 PROCEDURE — 11000001 HC ACUTE MED/SURG PRIVATE ROOM

## 2022-04-06 PROCEDURE — 83735 ASSAY OF MAGNESIUM: CPT | Performed by: EMERGENCY MEDICINE

## 2022-04-06 PROCEDURE — 27000221 HC OXYGEN, UP TO 24 HOURS

## 2022-04-06 PROCEDURE — 81000 URINALYSIS NONAUTO W/SCOPE: CPT | Performed by: EMERGENCY MEDICINE

## 2022-04-06 PROCEDURE — 85025 COMPLETE CBC W/AUTO DIFF WBC: CPT | Performed by: EMERGENCY MEDICINE

## 2022-04-06 PROCEDURE — 36415 COLL VENOUS BLD VENIPUNCTURE: CPT | Performed by: EMERGENCY MEDICINE

## 2022-04-06 PROCEDURE — 96365 THER/PROPH/DIAG IV INF INIT: CPT | Mod: 25

## 2022-04-06 PROCEDURE — 83605 ASSAY OF LACTIC ACID: CPT | Performed by: EMERGENCY MEDICINE

## 2022-04-06 PROCEDURE — 99285 EMERGENCY DEPT VISIT HI MDM: CPT | Mod: 25

## 2022-04-06 PROCEDURE — 87086 URINE CULTURE/COLONY COUNT: CPT | Performed by: EMERGENCY MEDICINE

## 2022-04-06 PROCEDURE — 99900035 HC TECH TIME PER 15 MIN (STAT)

## 2022-04-06 PROCEDURE — 25000003 PHARM REV CODE 250: Performed by: EMERGENCY MEDICINE

## 2022-04-06 PROCEDURE — 87040 BLOOD CULTURE FOR BACTERIA: CPT | Mod: 59 | Performed by: EMERGENCY MEDICINE

## 2022-04-06 PROCEDURE — 84484 ASSAY OF TROPONIN QUANT: CPT | Performed by: EMERGENCY MEDICINE

## 2022-04-06 PROCEDURE — 87502 INFLUENZA DNA AMP PROBE: CPT

## 2022-04-06 PROCEDURE — 83880 ASSAY OF NATRIURETIC PEPTIDE: CPT | Performed by: EMERGENCY MEDICINE

## 2022-04-06 PROCEDURE — 99900031 HC PATIENT EDUCATION (STAT)

## 2022-04-06 PROCEDURE — 63600175 PHARM REV CODE 636 W HCPCS: Performed by: EMERGENCY MEDICINE

## 2022-04-06 RX ORDER — ACETAMINOPHEN 325 MG/1
650 TABLET ORAL
Status: COMPLETED | OUTPATIENT
Start: 2022-04-06 | End: 2022-04-06

## 2022-04-06 RX ADMIN — CEFTRIAXONE 1 G: 1 INJECTION, SOLUTION INTRAVENOUS at 10:04

## 2022-04-06 RX ADMIN — ACETAMINOPHEN 650 MG: 325 TABLET ORAL at 09:04

## 2022-04-06 RX ADMIN — SODIUM CHLORIDE 500 ML: 0.9 INJECTION, SOLUTION INTRAVENOUS at 10:04

## 2022-04-07 PROBLEM — A41.9 SEPSIS: Status: ACTIVE | Noted: 2022-04-07

## 2022-04-07 PROBLEM — J96.22 ACUTE ON CHRONIC RESPIRATORY FAILURE WITH HYPOXIA AND HYPERCAPNIA: Status: ACTIVE | Noted: 2022-04-07

## 2022-04-07 PROBLEM — J96.21 ACUTE ON CHRONIC RESPIRATORY FAILURE WITH HYPOXIA AND HYPERCAPNIA: Status: ACTIVE | Noted: 2022-04-07

## 2022-04-07 PROBLEM — R41.82 AMS (ALTERED MENTAL STATUS): Status: ACTIVE | Noted: 2022-04-07

## 2022-04-07 PROBLEM — N39.0 URINARY TRACT INFECTION WITHOUT HEMATURIA: Status: ACTIVE | Noted: 2022-04-07

## 2022-04-07 PROBLEM — Z74.01 BEDBOUND: Status: ACTIVE | Noted: 2022-04-07

## 2022-04-07 LAB
ALBUMIN SERPL BCP-MCNC: 2.8 G/DL (ref 3.5–5.2)
ALP SERPL-CCNC: 116 U/L (ref 55–135)
ALT SERPL W/O P-5'-P-CCNC: 12 U/L (ref 10–44)
ANION GAP SERPL CALC-SCNC: 2 MMOL/L (ref 8–16)
AST SERPL-CCNC: 7 U/L (ref 10–40)
BASOPHILS # BLD AUTO: 0.01 K/UL (ref 0–0.2)
BASOPHILS NFR BLD: 0.1 % (ref 0–1.9)
BILIRUB SERPL-MCNC: 0.2 MG/DL (ref 0.1–1)
BIPAP: ABNORMAL
BUN SERPL-MCNC: 12 MG/DL (ref 8–23)
CALCIUM SERPL-MCNC: 8.8 MG/DL (ref 8.7–10.5)
CHLORIDE SERPL-SCNC: 100 MMOL/L (ref 95–110)
CO2 SERPL-SCNC: 38 MMOL/L (ref 23–29)
CORRECTED TEMPERATURE (PCO2): 70.4 MMHG
CORRECTED TEMPERATURE (PH): 7.36
CORRECTED TEMPERATURE (PO2): 108 MMHG
CREAT SERPL-MCNC: 0.9 MG/DL (ref 0.5–1.4)
DIFFERENTIAL METHOD: ABNORMAL
EOSINOPHIL # BLD AUTO: 0.1 K/UL (ref 0–0.5)
EOSINOPHIL NFR BLD: 0.7 % (ref 0–8)
ERYTHROCYTE [DISTWIDTH] IN BLOOD BY AUTOMATED COUNT: 15.2 % (ref 11.5–14.5)
EST. GFR  (AFRICAN AMERICAN): >60 ML/MIN/1.73 M^2
EST. GFR  (NON AFRICAN AMERICAN): >60 ML/MIN/1.73 M^2
FIO2: 50 %
GLUCOSE SERPL-MCNC: 85 MG/DL (ref 70–110)
HCO3 UR-SCNC: 34.5 MMOL/L
HCT VFR BLD AUTO: 45.3 % (ref 37–48.5)
HGB BLD-MCNC: 13.2 G/DL (ref 12–16)
IMM GRANULOCYTES # BLD AUTO: 0.01 K/UL (ref 0–0.04)
IMM GRANULOCYTES NFR BLD AUTO: 0.1 % (ref 0–0.5)
LYMPHOCYTES # BLD AUTO: 1.7 K/UL (ref 1–4.8)
LYMPHOCYTES NFR BLD: 25.2 % (ref 18–48)
Lab: ABNORMAL
MCH RBC QN AUTO: 28.4 PG (ref 27–31)
MCHC RBC AUTO-ENTMCNC: 29.1 G/DL (ref 32–36)
MCV RBC AUTO: 98 FL (ref 82–98)
MODIFIED ALLEN'S TEST: ABNORMAL
MONOCYTES # BLD AUTO: 1.1 K/UL (ref 0.3–1)
MONOCYTES NFR BLD: 16.9 % (ref 4–15)
NEUTROPHILS # BLD AUTO: 3.8 K/UL (ref 1.8–7.7)
NEUTROPHILS NFR BLD: 57 % (ref 38–73)
NOTIFIED BY: ABNORMAL
NRBC BLD-RTO: 0 /100 WBC
O2DEVICE: ABNORMAL
PCO2 BLDA: 70.4 MMHG (ref 35–45)
PH SMN: 7.36 [PH] (ref 7.34–7.45)
PLATELET # BLD AUTO: 208 K/UL (ref 150–450)
PMV BLD AUTO: 9.8 FL (ref 9.2–12.9)
PO2 BLDA: 108 MMHG (ref 80–100)
POC BASE DEFICIT: 13.9 MMOL/L
POC NOTIFIED NOTE: ABNORMAL
POC PERFORMED BY: ABNORMAL
POC SATURATED O2: 98.6 %
POC TCO2: 35.4 MMOL/L
POC TEMPERATURE: 37 C
POCT GLUCOSE: 87 MG/DL (ref 70–110)
POTASSIUM SERPL-SCNC: 3.7 MMOL/L (ref 3.5–5.1)
PROT SERPL-MCNC: 6.5 G/DL (ref 6–8.4)
PROVIDER NOTIFIED: ABNORMAL
RBC # BLD AUTO: 4.64 M/UL (ref 4–5.4)
SODIUM SERPL-SCNC: 140 MMOL/L (ref 136–145)
SPECIMEN SOURCE: ABNORMAL
WBC # BLD AUTO: 6.7 K/UL (ref 3.9–12.7)

## 2022-04-07 PROCEDURE — 27000190 HC CPAP FULL FACE MASK W/VALVE

## 2022-04-07 PROCEDURE — 94660 CPAP INITIATION&MGMT: CPT

## 2022-04-07 PROCEDURE — 94761 N-INVAS EAR/PLS OXIMETRY MLT: CPT

## 2022-04-07 PROCEDURE — 11000001 HC ACUTE MED/SURG PRIVATE ROOM

## 2022-04-07 PROCEDURE — 25000242 PHARM REV CODE 250 ALT 637 W/ HCPCS: Performed by: EMERGENCY MEDICINE

## 2022-04-07 PROCEDURE — 99900035 HC TECH TIME PER 15 MIN (STAT)

## 2022-04-07 PROCEDURE — A4216 STERILE WATER/SALINE, 10 ML: HCPCS | Performed by: NURSE PRACTITIONER

## 2022-04-07 PROCEDURE — 63600175 PHARM REV CODE 636 W HCPCS: Performed by: EMERGENCY MEDICINE

## 2022-04-07 PROCEDURE — 85025 COMPLETE CBC W/AUTO DIFF WBC: CPT | Performed by: EMERGENCY MEDICINE

## 2022-04-07 PROCEDURE — 36415 COLL VENOUS BLD VENIPUNCTURE: CPT | Performed by: EMERGENCY MEDICINE

## 2022-04-07 PROCEDURE — 25000003 PHARM REV CODE 250: Performed by: NURSE PRACTITIONER

## 2022-04-07 PROCEDURE — 36600 WITHDRAWAL OF ARTERIAL BLOOD: CPT

## 2022-04-07 PROCEDURE — 94640 AIRWAY INHALATION TREATMENT: CPT

## 2022-04-07 PROCEDURE — 25000003 PHARM REV CODE 250: Performed by: EMERGENCY MEDICINE

## 2022-04-07 PROCEDURE — 80053 COMPREHEN METABOLIC PANEL: CPT | Performed by: EMERGENCY MEDICINE

## 2022-04-07 PROCEDURE — 27000221 HC OXYGEN, UP TO 24 HOURS

## 2022-04-07 PROCEDURE — 99900031 HC PATIENT EDUCATION (STAT)

## 2022-04-07 PROCEDURE — 25000242 PHARM REV CODE 250 ALT 637 W/ HCPCS: Performed by: NURSE PRACTITIONER

## 2022-04-07 PROCEDURE — 82803 BLOOD GASES ANY COMBINATION: CPT

## 2022-04-07 PROCEDURE — 63600175 PHARM REV CODE 636 W HCPCS: Performed by: NURSE PRACTITIONER

## 2022-04-07 RX ORDER — TALC
6 POWDER (GRAM) TOPICAL NIGHTLY PRN
Status: DISCONTINUED | OUTPATIENT
Start: 2022-04-07 | End: 2022-04-12 | Stop reason: HOSPADM

## 2022-04-07 RX ORDER — METHYLPREDNISOLONE SOD SUCC 125 MG
80 VIAL (EA) INJECTION EVERY 8 HOURS
Status: DISCONTINUED | OUTPATIENT
Start: 2022-04-07 | End: 2022-04-10

## 2022-04-07 RX ORDER — SODIUM CHLORIDE 0.9 % (FLUSH) 0.9 %
10 SYRINGE (ML) INJECTION
Status: DISCONTINUED | OUTPATIENT
Start: 2022-04-07 | End: 2022-04-12 | Stop reason: HOSPADM

## 2022-04-07 RX ORDER — SODIUM CHLORIDE 9 MG/ML
INJECTION, SOLUTION INTRAVENOUS CONTINUOUS
Status: ACTIVE | OUTPATIENT
Start: 2022-04-07 | End: 2022-04-07

## 2022-04-07 RX ORDER — ONDANSETRON 2 MG/ML
4 INJECTION INTRAMUSCULAR; INTRAVENOUS EVERY 8 HOURS PRN
Status: DISCONTINUED | OUTPATIENT
Start: 2022-04-07 | End: 2022-04-12 | Stop reason: HOSPADM

## 2022-04-07 RX ORDER — ACETAMINOPHEN 325 MG/1
650 TABLET ORAL EVERY 8 HOURS PRN
Status: DISCONTINUED | OUTPATIENT
Start: 2022-04-07 | End: 2022-04-12 | Stop reason: HOSPADM

## 2022-04-07 RX ORDER — IPRATROPIUM BROMIDE AND ALBUTEROL SULFATE 2.5; .5 MG/3ML; MG/3ML
3 SOLUTION RESPIRATORY (INHALATION)
Status: DISCONTINUED | OUTPATIENT
Start: 2022-04-07 | End: 2022-04-12 | Stop reason: HOSPADM

## 2022-04-07 RX ADMIN — CEFTRIAXONE 1 G: 1 INJECTION, SOLUTION INTRAVENOUS at 12:04

## 2022-04-07 RX ADMIN — METHYLPREDNISOLONE SODIUM SUCCINATE 80 MG: 125 INJECTION, POWDER, FOR SOLUTION INTRAMUSCULAR; INTRAVENOUS at 06:04

## 2022-04-07 RX ADMIN — IPRATROPIUM BROMIDE AND ALBUTEROL SULFATE 3 ML: 2.5; .5 SOLUTION RESPIRATORY (INHALATION) at 01:04

## 2022-04-07 RX ADMIN — IPRATROPIUM BROMIDE AND ALBUTEROL SULFATE 3 ML: 2.5; .5 SOLUTION RESPIRATORY (INHALATION) at 07:04

## 2022-04-07 RX ADMIN — ACETAMINOPHEN 650 MG: 325 TABLET ORAL at 12:04

## 2022-04-07 RX ADMIN — SODIUM CHLORIDE: 0.9 INJECTION, SOLUTION INTRAVENOUS at 12:04

## 2022-04-07 RX ADMIN — METHYLPREDNISOLONE SODIUM SUCCINATE 80 MG: 125 INJECTION, POWDER, FOR SOLUTION INTRAMUSCULAR; INTRAVENOUS at 09:04

## 2022-04-07 RX ADMIN — Medication 10 ML: at 11:04

## 2022-04-07 RX ADMIN — IPRATROPIUM BROMIDE AND ALBUTEROL SULFATE 3 ML: 2.5; .5 SOLUTION RESPIRATORY (INHALATION) at 08:04

## 2022-04-07 RX ADMIN — METHYLPREDNISOLONE SODIUM SUCCINATE 80 MG: 125 INJECTION, POWDER, FOR SOLUTION INTRAMUSCULAR; INTRAVENOUS at 02:04

## 2022-04-07 NOTE — SUBJECTIVE & OBJECTIVE
Past Medical History:   Diagnosis Date    Alzheimer disease     Alzheimer disease     Coronary artery disease     Hyperlipidemia     Hypertension     Myopathy     Non-ST elevation (NSTEMI) myocardial infarction     Obesity     Pneumonia     Sleep apnea        Past Surgical History:   Procedure Laterality Date    APPENDECTOMY      APPENDECTOMY      CHOLECYSTECTOMY      CORONARY STENT PLACEMENT      HYSTERECTOMY      SURGICAL REMOVAL OF VERTEBRAL BODY OF THORACIC SPINE N/A 12/8/2020    Procedure: T9-T10 corpectomy, posterior instrumented fusion T7-T12.;  Surgeon: Everardo Gongora MD;  Location: [facility] OR [address] [place] [address];  Service: Neurosurgery;  Laterality: N/A;    TONSILLECTOMY         Review of patient's allergies indicates:   Allergen Reactions    Tizanidine        No current facility-administered medications on file prior to encounter.     Current Outpatient Medications on File Prior to Encounter   Medication Sig    acetaminophen (TYLENOL) 325 MG tablet Take 2 tablets (650 mg total) by mouth every 6 (six) hours as needed (HEADACHE, TEMPERATURE - FEVER > 100.4). (Patient not taking: Reported on 1/19/2021)    albuterol-ipratropium (DUO-NEB) 2.5 mg-0.5 mg/3 mL nebulizer solution Take 3 mLs by nebulization every 6 (six) hours as needed for Wheezing or Shortness of Breath. Rescue (Patient not taking: Reported on 1/19/2021)    aluminum & magnesium hydroxide-simethicone (MYLANTA MAX STRENGTH) 400-400-40 mg/5 mL suspension Take 30 mLs by mouth every 6 (six) hours as needed for Indigestion. (Patient not taking: Reported on 1/19/2021)    aspirin (ECOTRIN) 81 MG EC tablet Take 81 mg by mouth once daily.    bacitracin zinc 500 unit/gram OiPk Apply topically 2 (two) times daily.    clopidogreL (PLAVIX) 75 mg tablet Take 1 tablet (75 mg total) by mouth once daily.    donepeziL (ARICEPT) 10 MG tablet Take 10 mg by mouth every evening.    doxycycline (VIBRA-TABS) 100 MG tablet take one tablet by mouth twice daily (every 12 hours) for  infection until all gone. take with food. thank you!!!    DULoxetine (CYMBALTA) 60 MG capsule Take 60 mg by mouth once daily.    ferrous sulfate 324 mg (65 mg iron) TbEC Take 325 mg by mouth every evening.    furosemide (LASIX) 40 MG tablet Take 1 tablet (40 mg total) by mouth once daily.    isosorbide mononitrate (IMDUR) 60 MG 24 hr tablet Take 60 mg by mouth once daily.    memantine (NAMENDA) 10 MG Tab Take 1 tablet (10 mg total) by mouth 2 (two) times daily.    methocarbamoL (ROBAXIN) 750 MG Tab Take 1 tablet (750 mg total) by mouth 3 (three) times daily as needed.    miconazole NITRATE 2 % (MICOTIN) 2 % top powder Apply topically 2 (two) times daily. for 15 days    ondansetron (ZOFRAN-ODT) 4 MG TbDL Take 1 tablet (4 mg total) by mouth every 8 (eight) hours as needed. (Patient not taking: Reported on 2021)    oxyCODONE (ROXICODONE) 10 mg Tab immediate release tablet Take 1 tablet (10 mg total) by mouth every 6 (six) hours as needed for Pain.    pantoprazole (PROTONIX) 40 MG tablet Take 1 tablet (40 mg total) by mouth before breakfast.    potassium chloride SA (K-DUR,KLOR-CON) 20 MEQ tablet Take 1 tablet (20 mEq total) by mouth once daily.    pravastatin (PRAVACHOL) 40 MG tablet Take 40 mg by mouth every evening.    senna-docusate 8.6-50 mg (PERICOLACE) 8.6-50 mg per tablet Take 2 tablets by mouth nightly as needed for Constipation.     Family History       Problem Relation (Age of Onset)    Hypertension Father    Stroke Mother          Tobacco Use    Smoking status: Former Smoker     Types: Cigarettes     Quit date: 2000     Years since quittin.2    Smokeless tobacco: Never Used   Substance and Sexual Activity    Alcohol use: Not Currently    Drug use: Never    Sexual activity: Not Currently     Review of Systems   Unable to perform ROS: Acuity of condition   Objective:     Vital Signs (Most Recent):  Temp: 99.2 °F (37.3 °C) (22)  Pulse: 63 (22)  Resp: 13 (22)  BP:  (!) 146/69 (04/07/22 0718)  SpO2: 97 % (04/07/22 0728)   Vital Signs (24h Range):  Temp:  [97.7 °F (36.5 °C)-101.5 °F (38.6 °C)] 99.2 °F (37.3 °C)  Pulse:  [62-84] 63  Resp:  [10-34] 13  SpO2:  [80 %-99 %] 97 %  BP: (115-146)/(56-69) 146/69     Weight: (!) 161.9 kg (357 lb)  Body mass index is 52.72 kg/m².    Physical Exam  Vitals and nursing note reviewed.   Constitutional:       Appearance: Normal appearance. She is obese. She is ill-appearing.   HENT:      Head: Normocephalic and atraumatic.      Right Ear: Hearing normal.      Left Ear: Hearing normal.      Nose: Nose normal.      Mouth/Throat:      Lips: Pink.      Mouth: Mucous membranes are moist.   Eyes:      Pupils: Pupils are equal, round, and reactive to light.   Cardiovascular:      Rate and Rhythm: Normal rate and regular rhythm.      Pulses: Normal pulses.      Heart sounds: Normal heart sounds.   Pulmonary:      Effort: Pulmonary effort is normal.      Breath sounds: Normal breath sounds.      Comments: Continuous bipap  Abdominal:      General: Abdomen is flat. Bowel sounds are normal. There is no distension.      Palpations: Abdomen is soft.      Tenderness: There is no abdominal tenderness.   Musculoskeletal:         General: Normal range of motion.      Cervical back: Full passive range of motion without pain and normal range of motion.   Skin:     General: Skin is warm and dry.      Capillary Refill: Capillary refill takes less than 2 seconds.   Neurological:      General: No focal deficit present.      Mental Status: She is oriented to person, place, and time. Mental status is at baseline. She is lethargic.      Cranial Nerves: Cranial nerves are intact.   Psychiatric:         Speech: She is noncommunicative.         CRANIAL NERVES     CN III, IV, VI   Pupils are equal, round, and reactive to light.     Significant Labs: All pertinent labs within the past 24 hours have been reviewed.  Recent Lab Results  (Last 5 results in the past 24 hours)         04/07/22  0306   04/07/22  0248   04/06/22  2219   04/06/22  2207   04/06/22  2159        Base Deficit   13.9             Notified Note   RBR             Performed By:   Conner             Correct Temperature (PH)   7.356             Corrected Temperature (pCO2)   70.4             Corrected Temperature (pO2)   108             Provider Notified:   LORAINE AN             Specimen source   Arterial             Notified Time   04/07/2022 02:49             Notified By   Conner             Albumin 2.8         3.0       Alkaline Phosphatase 116         123       ALT 12         13       Anion Gap 2         2       Appearance, UA     Cloudy           AST 7         6       Bacteria, UA     None           Baso # 0.01         0.02       Basophil % 0.1         0.3       Bilirubin (UA)     Negative           BILIRUBIN TOTAL 0.2  Comment: For infants and newborns, interpretation of results should be based  on gestational age, weight and in agreement with clinical  observations.    Premature Infant recommended reference ranges:  Up to 24 hours.............<8.0 mg/dL  Up to 48 hours............<12.0 mg/dL  3-5 days..................<15.0 mg/dL  6-29 days.................<15.0 mg/dL    For patients on Eltrombopag therapy, use of Dimension [place] TBIL is   not   recommended.           0.3  Comment: For infants and newborns, interpretation of results should be based  on gestational age, weight and in agreement with clinical  observations.    Premature Infant recommended reference ranges:  Up to 24 hours.............<8.0 mg/dL  Up to 48 hours............<12.0 mg/dL  3-5 days..................<15.0 mg/dL  6-29 days.................<15.0 mg/dL    For patients on Eltrombopag therapy, use of Dimension [place] TBIL is   not   recommended.         BIPAP   14/7             BUN 12         11       Calcium 8.8         8.9       Chloride 100         100       CO2 38         38       Color, UA     Yellow           Creatinine 0.9         1.0        Differential Method Automated         Automated       eGFR if  >60.0         >60.0       eGFR if non  >60.0  Comment: Calculation used to obtain the estimated glomerular filtration  rate (eGFR) is the CKD-EPI equation.            56.0  Comment: Calculation used to obtain the estimated glomerular filtration  rate (eGFR) is the CKD-EPI equation.          Eos # 0.1         0.0       Eosinophil % 0.7         0.5       Epithelial Casts, UA     0           FiO2   50.0             Glucose 85         142       Glucose, UA     Negative           Gran # (ANC) 3.8         5.2       Gran % 57.0         66.9       Hematocrit 45.3         47.0       Hemoglobin 13.2         13.7       Hyaline Casts, UA     36.57           Immature Grans (Abs) 0.01  Comment: Mild elevation in immature granulocytes is non specific and   can be seen in a variety of conditions including stress response,   acute inflammation, trauma and pregnancy. Correlation with other   laboratory and clinical findings is essential.           0.01  Comment: Mild elevation in immature granulocytes is non specific and   can be seen in a variety of conditions including stress response,   acute inflammation, trauma and pregnancy. Correlation with other   laboratory and clinical findings is essential.         Immature Granulocytes 0.1         0.1       Ketones, UA     Trace           Lactate, Rakesh       1.0         Leukocytes, UA     1+           Lymph # 1.7         1.5       Lymph % 25.2         19.7       Magnesium         2.1       MCH 28.4         28.6       MCHC 29.1         29.1       MCV 98         98       Microscopic Comment     SEE COMMENT  Comment: Other formed elements not mentioned in the report are not   present in the microscopic examination.              MODIFIED REAGAN'S TEST   NA             Mono # 1.1         1.0       Mono % 16.9         12.5       MPV 9.8         9.6       NITRITE UA     Negative           nRBC 0          0       NT-proBNP         482       O2DEVICE   BIPAP             Occult Blood UA     Negative           pH, UA     5.0           Platelets 208         216       POC HCO3   34.5             POC PCO2   70.4  Comment: Conner notified LORAINE AN at 04/07/2022 02:49  RBR read back  Value above critical limit               POC PH   7.356             POC PO2   108  Comment: Value above reference range             POC SATURATED O2   98.6             POC TCO2   35.4             POC Temp   37.0             Potassium 3.7         4.2       PROTEIN TOTAL 6.5         6.7       Protein, UA     Negative  Comment: Recommend a 24 hour urine protein or a urine   protein/creatinine ratio if globulin induced proteinuria is  clinically suspected.             RBC 4.64         4.79       RBC, UA     2           RDW 15.2         15.3       Sodium 140         140       Specific Speed, UA     1.020           Specimen UA     Urine, Catheterized           Squam Epithel, UA     6           Troponin I High Sensitivity         19.0       UROBILINOGEN UA     1.0           WBC, UA     >100           WBC 6.70         7.81       Yeast, UA     None                                  Significant Imaging:   X-Ray Chest AP Portable  Narrative: EXAMINATION:  XR CHEST AP PORTABLE    CLINICAL HISTORY:  Sepsis;    COMPARISON:  03/14/2022    FINDINGS:  Increased hazy attenuation left lower lung zone suspicious for increased pleural fluid adjacent atelectasis or infiltrate.  Prominent cardiomediastinal silhouette has not significantly changed.  Mild vascular congestion.  Impression: Increased attenuation left lower hemithorax suspicious for increased pleural fluid adjacent atelectasis and or infiltrate    Prominent cardiomediastinal silhouette and vascular congestion, similar to prior    Electronically signed by: Fior Cummins MD  Date:    04/07/2022  Time:    09:12

## 2022-04-07 NOTE — ASSESSMENT & PLAN NOTE
Body mass index is 52.72 kg/m². Morbid obesity complicates all aspects of disease management from diagnostic modalities to treatment. Weight loss encouraged and health benefits explained to patient.

## 2022-04-07 NOTE — NURSING
Called to let rosie know about results of ABG C02 70, rosie spoke with [facility] about adjusting BIPAP settings, also will discuss code status with the family in the am. Will continue to monitor.

## 2022-04-07 NOTE — NURSING
On first rounds patient very lethargic, open eyes when called or shock, bi pap maintained, later moved to air bed, more alert and awake, but confused, patient c/o back tylenol given, resting on side bi pap changed to nasal cannula

## 2022-04-07 NOTE — ASSESSMENT & PLAN NOTE
Patient with Hypercapnic and Hypoxic Respiratory failure which is Acute on chronic.  she is on home oxygen at 3  LPM. Supplemental oxygen was provided and noted- Oxygen Concentration (%):  [50] 50.   Signs/symptoms of respiratory failure include- tachypnea, use of accessory muscles and lethargy. Contributing diagnoses includes - COPD, Interstitial lung disease and Obesity Hypoventilation Labs and images were reviewed. Patient Has recent ABG, which has been reviewed. Will treat underlying causes and adjust management of respiratory failure as follows-     Continue Oxygen via bipap

## 2022-04-07 NOTE — CARE UPDATE
04/07/22 0205   Patient Assessment/Suction   Level of Consciousness (AVPU) responds to voice   Respiratory Effort Unlabored   Expansion/Accessory Muscles/Retractions no use of accessory muscles   All Lung Fields Breath Sounds diminished   Rhythm/Pattern, Respiratory unlabored   Cough Frequency no cough   PRE-TX-O2   O2 Device (Oxygen Therapy) BiPAP   $ Is the patient on Low Flow Oxygen? Yes   Oxygen Concentration (%) 50   SpO2 95 %   Pulse Oximetry Type Intermittent   $ Pulse Oximetry - Multiple Charge Pulse Oximetry - Multiple   Pulse 63   Resp 17   Skin Integrity   $ Wound Care Tech Time 15 min   Area Observed Cheek;Bridge of nose;Chin;Forehead   Skin Appearance pale   Barrier used? Liquid Filled Cushion   Equipment Change   $ RT Equipment Treatment nebuilzer;Aerosol treatment mask;Venti Mask;BiPAP/CPAP Circuit;BiPAP machine;large full face mask   Ready to Wean/Extubation Screen   FIO2<=50 (chart decimal) 0.5   Preset CPAP/BiPAP Settings   Mode Of Delivery BiPAP S/T   $ CPAP/BiPAP Daily Charge BiPAP/CPAP Daily   $ Initial CPAP/BiPAP Setup? Yes   $ Is patient using? Yes   Size of Mask Medium/Large   Sized Appropriately? Yes   Equipment Type [employer]    Airway Device Type large full face mask   Humidifier not applicable   Ipap 14   EPAP (cm H2O) 7   Pressure Support (cm H2O) 7   ITime (sec) 1.3   Rise Time (sec) 2   Patient CPAP/BiPAP Settings   RR Total (Breaths/Min) 17   Tidal Volume (mL) 520   VE Minute Ventilation (L/min) 8.8 L/min   Peak Inspiratory Pressure (cm H2O) 14   Total Leak (L/Min) 39   Patient Trigger - ST Mode Only (%) 100   CPAP/BiPAP Backup Settings   Backup Rate 8 breaths per minute (bpm)   CPAP/BiPAP Alarms   High RR (breaths/min) 45   Low RR (breaths/min) 8   Apnea (Sec) 20   Respiratory Evaluation   $ Care Plan Tech Time 30 min   Patient placed on bipap per Dr. Gonsales. Tolerating well at this time with no distresss noted

## 2022-04-07 NOTE — HPI
72 yo female with extensive pmhx including dementia, chronic respiratory failure on home O2 is here via EMS from home with complaint of SOB and CP x 1 day. No aggravating or alleviating factors. EMS reports sat on 2L was 66%. Given duoneb abd solumedrol en route. Similar past presentations. Dementia limits this history. Spoke with Sharon aranda daughter for history and reports that she is DNR

## 2022-04-07 NOTE — H&P
[facility] Medicine  History & Physical    Patient Name: Martina Betancourt  MRN: [id number]  Patient Class: IP- Inpatient  Admission Date: 4/6/2022  Attending Physician: Joe Fuller III, MD   Primary Care Provider: Joe Fuller III, MD         Patient information was obtained from patient, past medical records and ER records.     Subjective:     Principal Problem:Sepsis    Chief Complaint:   Chief Complaint   Patient presents with    Shortness of Breath     Pt brought in by EMS for SOB all day as well as chest pain. EMS stated the pt SPO2 was 66% on her 2 LPM of home O2. EMS placed pt on CPAP, 125 mg solumedrol, and duoneb x1. Pt 98% upon arrival on CPAP.         HPI: 70 yo female with extensive pmhx including dementia, chronic respiratory failure on home O2 is here via EMS from home with complaint of SOB and CP x 1 day. No aggravating or alleviating factors. EMS reports sat on 2L was 66%. Given duoneb abd solumedrol en route. Similar past presentations. Dementia limits this history. Spoke with Sharon aranda daughter for history and reports that she is DNR      Past Medical History:   Diagnosis Date    Alzheimer disease     Alzheimer disease     Coronary artery disease     Hyperlipidemia     Hypertension     Myopathy     Non-ST elevation (NSTEMI) myocardial infarction     Obesity     Pneumonia     Sleep apnea        Past Surgical History:   Procedure Laterality Date    APPENDECTOMY      APPENDECTOMY      CHOLECYSTECTOMY      CORONARY STENT PLACEMENT      HYSTERECTOMY      SURGICAL REMOVAL OF VERTEBRAL BODY OF THORACIC SPINE N/A 12/8/2020    Procedure: T9-T10 corpectomy, posterior instrumented fusion T7-T12.;  Surgeon: Everardo Gongora MD;  Location: [facility] OR [address] [place] [address];  Service: Neurosurgery;  Laterality: N/A;    TONSILLECTOMY         Review of patient's allergies indicates:   Allergen Reactions    Tizanidine        No current facility-administered medications on file prior to  encounter.     Current Outpatient Medications on File Prior to Encounter   Medication Sig    acetaminophen (TYLENOL) 325 MG tablet Take 2 tablets (650 mg total) by mouth every 6 (six) hours as needed (HEADACHE, TEMPERATURE - FEVER > 100.4). (Patient not taking: Reported on 1/19/2021)    albuterol-ipratropium (DUO-NEB) 2.5 mg-0.5 mg/3 mL nebulizer solution Take 3 mLs by nebulization every 6 (six) hours as needed for Wheezing or Shortness of Breath. Rescue (Patient not taking: Reported on 1/19/2021)    aluminum & magnesium hydroxide-simethicone (MYLANTA MAX STRENGTH) 400-400-40 mg/5 mL suspension Take 30 mLs by mouth every 6 (six) hours as needed for Indigestion. (Patient not taking: Reported on 1/19/2021)    aspirin (ECOTRIN) 81 MG EC tablet Take 81 mg by mouth once daily.    bacitracin zinc 500 unit/gram OiPk Apply topically 2 (two) times daily.    clopidogreL (PLAVIX) 75 mg tablet Take 1 tablet (75 mg total) by mouth once daily.    donepeziL (ARICEPT) 10 MG tablet Take 10 mg by mouth every evening.    doxycycline (VIBRA-TABS) 100 MG tablet take one tablet by mouth twice daily (every 12 hours) for infection until all gone. take with food. thank you!!!    DULoxetine (CYMBALTA) 60 MG capsule Take 60 mg by mouth once daily.    ferrous sulfate 324 mg (65 mg iron) TbEC Take 325 mg by mouth every evening.    furosemide (LASIX) 40 MG tablet Take 1 tablet (40 mg total) by mouth once daily.    isosorbide mononitrate (IMDUR) 60 MG 24 hr tablet Take 60 mg by mouth once daily.    memantine (NAMENDA) 10 MG Tab Take 1 tablet (10 mg total) by mouth 2 (two) times daily.    methocarbamoL (ROBAXIN) 750 MG Tab Take 1 tablet (750 mg total) by mouth 3 (three) times daily as needed.    miconazole NITRATE 2 % (MICOTIN) 2 % top powder Apply topically 2 (two) times daily. for 15 days    ondansetron (ZOFRAN-ODT) 4 MG TbDL Take 1 tablet (4 mg total) by mouth every 8 (eight) hours as needed. (Patient not taking: Reported on  2021)    oxyCODONE (ROXICODONE) 10 mg Tab immediate release tablet Take 1 tablet (10 mg total) by mouth every 6 (six) hours as needed for Pain.    pantoprazole (PROTONIX) 40 MG tablet Take 1 tablet (40 mg total) by mouth before breakfast.    potassium chloride SA (K-DUR,KLOR-CON) 20 MEQ tablet Take 1 tablet (20 mEq total) by mouth once daily.    pravastatin (PRAVACHOL) 40 MG tablet Take 40 mg by mouth every evening.    senna-docusate 8.6-50 mg (PERICOLACE) 8.6-50 mg per tablet Take 2 tablets by mouth nightly as needed for Constipation.     Family History       Problem Relation (Age of Onset)    Hypertension Father    Stroke Mother          Tobacco Use    Smoking status: Former Smoker     Types: Cigarettes     Quit date: 2000     Years since quittin.2    Smokeless tobacco: Never Used   Substance and Sexual Activity    Alcohol use: Not Currently    Drug use: Never    Sexual activity: Not Currently     Review of Systems   Unable to perform ROS: Acuity of condition   Objective:     Vital Signs (Most Recent):  Temp: 99.2 °F (37.3 °C) (22)  Pulse: 63 (22)  Resp: 13 (22)  BP: (!) 146/69 (22)  SpO2: 97 % (22)   Vital Signs (24h Range):  Temp:  [97.7 °F (36.5 °C)-101.5 °F (38.6 °C)] 99.2 °F (37.3 °C)  Pulse:  [62-84] 63  Resp:  [10-34] 13  SpO2:  [80 %-99 %] 97 %  BP: (115-146)/(56-69) 146/69     Weight: (!) 161.9 kg (357 lb)  Body mass index is 52.72 kg/m².    Physical Exam  Vitals and nursing note reviewed.   Constitutional:       Appearance: Normal appearance. She is obese. She is ill-appearing.   HENT:      Head: Normocephalic and atraumatic.      Right Ear: Hearing normal.      Left Ear: Hearing normal.      Nose: Nose normal.      Mouth/Throat:      Lips: Pink.      Mouth: Mucous membranes are moist.   Eyes:      Pupils: Pupils are equal, round, and reactive to light.   Cardiovascular:      Rate and Rhythm: Normal rate and regular rhythm.       Pulses: Normal pulses.      Heart sounds: Normal heart sounds.   Pulmonary:      Effort: Pulmonary effort is normal.      Breath sounds: Normal breath sounds.      Comments: Continuous bipap  Abdominal:      General: Abdomen is flat. Bowel sounds are normal. There is no distension.      Palpations: Abdomen is soft.      Tenderness: There is no abdominal tenderness.   Musculoskeletal:         General: Normal range of motion.      Cervical back: Full passive range of motion without pain and normal range of motion.   Skin:     General: Skin is warm and dry.      Capillary Refill: Capillary refill takes less than 2 seconds.   Neurological:      General: No focal deficit present.      Mental Status: She is oriented to person, place, and time. Mental status is at baseline. She is lethargic.      Cranial Nerves: Cranial nerves are intact.   Psychiatric:         Speech: She is noncommunicative.         CRANIAL NERVES     CN III, IV, VI   Pupils are equal, round, and reactive to light.     Significant Labs: All pertinent labs within the past 24 hours have been reviewed.  Recent Lab Results  (Last 5 results in the past 24 hours)        04/07/22  0306   04/07/22  0248   04/06/22  2219   04/06/22  2207   04/06/22  2159        Base Deficit   13.9             Notified Note   RBR             Performed By:   Conner             Correct Temperature (PH)   7.356             Corrected Temperature (pCO2)   70.4             Corrected Temperature (pO2)   108             Provider Notified:   LORAINE AN             Specimen source   Arterial             Notified Time   04/07/2022 02:49             Notified By   Conner             Albumin 2.8         3.0       Alkaline Phosphatase 116         123       ALT 12         13       Anion Gap 2         2       Appearance, UA     Cloudy           AST 7         6       Bacteria, UA     None           Baso # 0.01         0.02       Basophil % 0.1         0.3       Bilirubin (UA)     Negative            BILIRUBIN TOTAL 0.2  Comment: For infants and newborns, interpretation of results should be based  on gestational age, weight and in agreement with clinical  observations.    Premature Infant recommended reference ranges:  Up to 24 hours.............<8.0 mg/dL  Up to 48 hours............<12.0 mg/dL  3-5 days..................<15.0 mg/dL  6-29 days.................<15.0 mg/dL    For patients on Eltrombopag therapy, use of Dimension Watson TBIL is   not   recommended.           0.3  Comment: For infants and newborns, interpretation of results should be based  on gestational age, weight and in agreement with clinical  observations.    Premature Infant recommended reference ranges:  Up to 24 hours.............<8.0 mg/dL  Up to 48 hours............<12.0 mg/dL  3-5 days..................<15.0 mg/dL  6-29 days.................<15.0 mg/dL    For patients on Eltrombopag therapy, use of Dimension Watson TBIL is   not   recommended.         BIPAP   14/7             BUN 12         11       Calcium 8.8         8.9       Chloride 100         100       CO2 38         38       Color, UA     Yellow           Creatinine 0.9         1.0       Differential Method Automated         Automated       eGFR if  >60.0         >60.0       eGFR if non  >60.0  Comment: Calculation used to obtain the estimated glomerular filtration  rate (eGFR) is the CKD-EPI equation.            56.0  Comment: Calculation used to obtain the estimated glomerular filtration  rate (eGFR) is the CKD-EPI equation.          Eos # 0.1         0.0       Eosinophil % 0.7         0.5       Epithelial Casts, UA     0           FiO2   50.0             Glucose 85         142       Glucose, UA     Negative           Gran # (ANC) 3.8         5.2       Gran % 57.0         66.9       Hematocrit 45.3         47.0       Hemoglobin 13.2         13.7       Hyaline Casts, UA     36.57           Immature Grans (Abs) 0.01  Comment: Mild elevation in  immature granulocytes is non specific and   can be seen in a variety of conditions including stress response,   acute inflammation, trauma and pregnancy. Correlation with other   laboratory and clinical findings is essential.           0.01  Comment: Mild elevation in immature granulocytes is non specific and   can be seen in a variety of conditions including stress response,   acute inflammation, trauma and pregnancy. Correlation with other   laboratory and clinical findings is essential.         Immature Granulocytes 0.1         0.1       Ketones, UA     Trace           Lactate, Rakesh       1.0         Leukocytes, UA     1+           Lymph # 1.7         1.5       Lymph % 25.2         19.7       Magnesium         2.1       MCH 28.4         28.6       MCHC 29.1         29.1       MCV 98         98       Microscopic Comment     SEE COMMENT  Comment: Other formed elements not mentioned in the report are not   present in the microscopic examination.              MODIFIED REAGAN'S TEST   NA             Mono # 1.1         1.0       Mono % 16.9         12.5       MPV 9.8         9.6       NITRITE UA     Negative           nRBC 0         0       NT-proBNP         482       O2DEVICE   BIPAP             Occult Blood UA     Negative           pH, UA     5.0           Platelets 208         216       POC HCO3   34.5             POC PCO2   70.4  Comment: Conner notified LORAINE AN at 04/07/2022 02:49  RBR read back  Value above critical limit               POC PH   7.356             POC PO2   108  Comment: Value above reference range             POC SATURATED O2   98.6             POC TCO2   35.4             POC Temp   37.0             Potassium 3.7         4.2       PROTEIN TOTAL 6.5         6.7       Protein, UA     Negative  Comment: Recommend a 24 hour urine protein or a urine   protein/creatinine ratio if globulin induced proteinuria is  clinically suspected.             RBC 4.64         4.79       RBC, UA     2           RDW  15.2         15.3       Sodium 140         140       Specific [place], UA     1.020           Specimen UA     Urine, Catheterized           Squam Epithel, UA     6           Troponin I High Sensitivity         19.0       UROBILINOGEN UA     1.0           WBC, UA     >100           WBC 6.70         7.81       Yeast, UA     None                                  Significant Imaging:   X-Ray Chest AP Portable  Narrative: EXAMINATION:  XR CHEST AP PORTABLE    CLINICAL HISTORY:  Sepsis;    COMPARISON:  03/14/2022    FINDINGS:  Increased hazy attenuation left lower lung zone suspicious for increased pleural fluid adjacent atelectasis or infiltrate.  Prominent cardiomediastinal silhouette has not significantly changed.  Mild vascular congestion.  Impression: Increased attenuation left lower hemithorax suspicious for increased pleural fluid adjacent atelectasis and or infiltrate    Prominent cardiomediastinal silhouette and vascular congestion, similar to prior    Electronically signed by: Fior Cummins MD  Date:    04/07/2022  Time:    09:12     Assessment/Plan:     * Sepsis  This patient does have evidence of infective focus  My overall impression is sepsis. Vital signs were reviewed and noted in progress note.  Antibiotics given-   Antibiotics (From admission, onward)            Start     Stop Route Frequency Ordered    04/07/22 0031  cefTRIAXone (ROCEPHIN) 1 g/50 mL D5W IVPB         -- IV Every 24 hours (non-standard times) 04/07/22 0031        Cultures were taken-   Microbiology Results (last 7 days)     Procedure Component Value Units Date/Time    Blood culture x two cultures. Draw prior to antibiotics. [[id number]] Collected: 04/06/22 2159    Order Status: Sent Specimen: Blood Updated: 04/07/22 0400    Blood culture x two cultures. Draw prior to antibiotics. [[id number]] Collected: 04/06/22 2207    Order Status: Sent Specimen: Blood Updated: 04/07/22 0400    Urine culture [[id number]] Collected: 04/06/22 2219    Order  Status: No result Specimen: Urine Updated: 04/06/22 2228        Latest lactate reviewed, they are-  Recent Labs   Lab 04/06/22 2207   LACTATE 1.0       Organ dysfunction indicated by Encephalopathy  and Acute respiratory failure    Acute on chronic respiratory failure with hypoxia and hypercapnia  Patient with Hypercapnic and Hypoxic Respiratory failure which is Acute on chronic.  she is on home oxygen at 3  LPM. Supplemental oxygen was provided and noted- Oxygen Concentration (%):  [50] 50.   Signs/symptoms of respiratory failure include- tachypnea, use of accessory muscles and lethargy. Contributing diagnoses includes - COPD, Interstitial lung disease and Obesity Hypoventilation Labs and images were reviewed. Patient Has recent ABG, which has been reviewed. Will treat underlying causes and adjust management of respiratory failure as follows-     Continue Oxygen via bipap    Urinary tract infection without hematuria  Continue rocephin and await final urine cultures      Severe obesity (BMI >= 40)  Body mass index is 52.72 kg/m². Morbid obesity complicates all aspects of disease management from diagnostic modalities to treatment. Weight loss encouraged and health benefits explained to patient.         Dementia without behavioral disturbance          VTE Risk Mitigation (From admission, onward)         Ordered     IP VTE HIGH RISK PATIENT  Once         04/07/22 0031     Place sequential compression device  Until discontinued         04/07/22 0031                   Livia Rivera NP  Department of Hospital Medicine   [facility]

## 2022-04-07 NOTE — ASSESSMENT & PLAN NOTE
This patient does have evidence of infective focus  My overall impression is sepsis. Vital signs were reviewed and noted in progress note.  Antibiotics given-   Antibiotics (From admission, onward)            Start     Stop Route Frequency Ordered    04/07/22 0031  cefTRIAXone (ROCEPHIN) 1 g/50 mL D5W IVPB         -- IV Every 24 hours (non-standard times) 04/07/22 0031        Cultures were taken-   Microbiology Results (last 7 days)     Procedure Component Value Units Date/Time    Blood culture x two cultures. Draw prior to antibiotics. [[id number]] Collected: 04/06/22 2159    Order Status: Sent Specimen: Blood Updated: 04/07/22 0400    Blood culture x two cultures. Draw prior to antibiotics. [[id number]] Collected: 04/06/22 2207    Order Status: Sent Specimen: Blood Updated: 04/07/22 0400    Urine culture [[id number]] Collected: 04/06/22 2219    Order Status: No result Specimen: Urine Updated: 04/06/22 2228        Latest lactate reviewed, they are-  Recent Labs   Lab 04/06/22 2207   LACTATE 1.0       Organ dysfunction indicated by Encephalopathy  and Acute respiratory failure

## 2022-04-07 NOTE — ED PROVIDER NOTES
Encounter Date: 2022       History     Chief Complaint   Patient presents with    Shortness of Breath     Pt brought in by EMS for SOB all day as well as chest pain. EMS stated the pt SPO2 was 66% on her 2 LPM of home O2. EMS placed pt on CPAP, 125 mg solumedrol, and duoneb x1. Pt 98% upon arrival on CPAP.      72 yo female with extensive pmhx including dementia, chronic respiratory failure on home O2 is here via EMS from home with complaint of SOB and CP x 1 day. No aggravating or alleviating factors. EMS reports sat on 2L was 66%. Given duoneb abd solumedrol en route. Similar past presentations. Dementia limits this history.         Review of patient's allergies indicates:   Allergen Reactions    Tizanidine      Past Medical History:   Diagnosis Date    Alzheimer disease     Alzheimer disease     Coronary artery disease     Hyperlipidemia     Hypertension     Myopathy     Non-ST elevation (NSTEMI) myocardial infarction     Obesity     Pneumonia     Sleep apnea      Past Surgical History:   Procedure Laterality Date    APPENDECTOMY      APPENDECTOMY      CHOLECYSTECTOMY      CORONARY STENT PLACEMENT      HYSTERECTOMY      SURGICAL REMOVAL OF VERTEBRAL BODY OF THORACIC SPINE N/A 2020    Procedure: T9-T10 corpectomy, posterior instrumented fusion T7-T12.;  Surgeon: Everardo Gongora MD;  Location: [facility] OR [address] [place] [address];  Service: Neurosurgery;  Laterality: N/A;    TONSILLECTOMY       Family History   Problem Relation Age of Onset    Stroke Mother     Hypertension Father      Social History     Tobacco Use    Smoking status: Former Smoker     Types: Cigarettes     Quit date: 2000     Years since quittin.2    Smokeless tobacco: Never Used   Substance Use Topics    Alcohol use: Not Currently    Drug use: Never     Review of Systems   Unable to perform ROS: Dementia       Physical Exam     Initial Vitals   BP Pulse Resp Temp SpO2   22  04/06/22 2130   (!) 123/58 84 (!) 26 (!) 101.5 °F (38.6 °C) (!) 80 %      MAP       --                Physical Exam    Nursing note and vitals reviewed.  Constitutional: She is not diaphoretic. No distress.   obese   HENT:   Head: Normocephalic and atraumatic.   Eyes: Conjunctivae and EOM are normal. Pupils are equal, round, and reactive to light. No scleral icterus.   Neck: Neck supple.   Normal range of motion.  Cardiovascular: Normal rate, regular rhythm and intact distal pulses.   Pulmonary/Chest: No respiratory distress. She has wheezes. She has rhonchi. She has no rales.   Abdominal: Abdomen is soft. Bowel sounds are normal. She exhibits no distension. There is no abdominal tenderness. There is no rebound and no guarding.   Musculoskeletal:         General: No tenderness or edema.      Cervical back: Normal range of motion and neck supple.     Neurological: She is alert.   Oriented to person and place only   Skin: Skin is warm and dry. Capillary refill takes less than 2 seconds. No rash noted. No erythema.         ED Course   Procedures  Labs Reviewed   CBC W/ AUTO DIFFERENTIAL - Abnormal; Notable for the following components:       Result Value    MCHC 29.1 (*)     RDW 15.3 (*)     All other components within normal limits   COMPREHENSIVE METABOLIC PANEL - Abnormal; Notable for the following components:    CO2 38 (*)     Glucose 142 (*)     Albumin 3.0 (*)     AST 6 (*)     Anion Gap 2 (*)     eGFR if non  56.0 (*)     All other components within normal limits   URINALYSIS, REFLEX TO URINE CULTURE - Abnormal; Notable for the following components:    Appearance, UA Cloudy (*)     Ketones, UA Trace (*)     Leukocytes, UA 1+ (*)     All other components within normal limits    Narrative:     Preferred Collection Type->Urine, Clean Catch  Specimen Source->Urine   URINALYSIS MICROSCOPIC - Abnormal; Notable for the following components:    WBC, UA >100 (*)     Hyaline Casts, UA 36.57 (*)     All  other components within normal limits    Narrative:     Preferred Collection Type->Urine, Clean Catch  Specimen Source->Urine   CULTURE, BLOOD   CULTURE, BLOOD   CULTURE, URINE   LACTIC ACID, PLASMA   MAGNESIUM   TROPONIN I HIGH SENSITIVITY   NT-PRO NATRIURETIC PEPTIDE   NT-PRO NATRIURETIC PEPTIDE   TROPONIN I HIGH SENSITIVITY   SARS-COV-2 RDRP GENE    Narrative:     This test utilizes isothermal nucleic acid amplification   technology to detect the SARS-CoV-2 RdRp nucleic acid segment.   The analytical sensitivity (limit of detection) is 125 genome   equivalents/mL.   A POSITIVE result implies infection with the SARS-CoV-2 virus;   the patient is presumed to be contagious.     A NEGATIVE result means that SARS-CoV-2 nucleic acids are not   present above the limit of detection. A NEGATIVE result should be   treated as presumptive. It does not rule out the possibility of   COVID-19 and should not be the sole basis for treatment decisions.   If COVID-19 is strongly suspected based on clinical and exposure   history, re-testing using an alternate molecular assay should be   considered.   This test is only for use under the Food and Drug   Administration s Emergency Use Authorization (EUA).   Commercial kits are provided by [employer].   Performance characteristics of the EUA have been independently   verified by [facility] Department of   Pathology and Laboratory Medicine.   _________________________________________________________________   The authorized Fact Sheet for Healthcare Providers and the authorized Fact   Sheet for Patients of the ID NOW COVID-19 are available on the FDA   website:     https://www.fda.gov/media/180358/download  https://www.fda.gov/media/138921/download          POCT INFLUENZA A/B MOLECULAR     EKG Readings: (Independently Interpreted)   Initial Reading: No STEMI. Rhythm: Normal Sinus Rhythm. Heart Rate: 82. Ectopy: No Ectopy. Clinical Impression: Normal Sinus Rhythm        Imaging Results          X-Ray Chest AP Portable (In process)               X-Rays:   Independently Interpreted Readings:   Chest X-Ray: No acute abnormalities.     Medications   sodium chloride 0.9% bolus 500 mL (500 mLs Intravenous New Bag 4/6/22 2245)   acetaminophen tablet 650 mg (650 mg Oral Given 4/6/22 2157)   cefTRIAXone (ROCEPHIN) 1 g/50 mL D5W IVPB (0 g Intravenous Stopped 4/6/22 2315)     Medical Decision Making:   Clinical Tests:   Lab Tests: Reviewed and Ordered  Radiological Study: Reviewed and Ordered  Medical Tests: Reviewed and Ordered  ED Management:  Discussed with Ramona THAPA on for Dr Fuller.                       Clinical Impression:   Final diagnoses:  [J96.21, J96.22] Acute on chronic respiratory failure with hypoxia and hypercapnia (Primary)  [N39.0] Urinary tract infection without hematuria, site unspecified          ED Disposition Condition    Admit               Caleb Gonsales MD  04/06/22 4962

## 2022-04-07 NOTE — NURSING
Spoke with Ramona Downey, NP with Dr. Fuller about pt. Having apneic episode, explained that went in room and pt. Respirations were shallow, sternal rubbed pt. And she was not responding to me, explained that this was a change from admission, on admission she was talking, answering questions, and even helping turn self in bed, we did a rapid response on her and Dr. Cadena ordered continuous BIPAP, new order given for ABG x1 now, Read back orders, will continue to monitor.

## 2022-04-08 LAB
ALBUMIN SERPL BCP-MCNC: 2.7 G/DL (ref 3.5–5.2)
ALP SERPL-CCNC: 104 U/L (ref 55–135)
ALT SERPL W/O P-5'-P-CCNC: 14 U/L (ref 10–44)
ANION GAP SERPL CALC-SCNC: 1 MMOL/L (ref 8–16)
AST SERPL-CCNC: 8 U/L (ref 10–40)
BACTERIA UR CULT: NORMAL
BACTERIA UR CULT: NORMAL
BASOPHILS # BLD AUTO: 0 K/UL (ref 0–0.2)
BASOPHILS NFR BLD: 0 % (ref 0–1.9)
BILIRUB SERPL-MCNC: 0.2 MG/DL (ref 0.1–1)
BUN SERPL-MCNC: 13 MG/DL (ref 8–23)
CALCIUM SERPL-MCNC: 9 MG/DL (ref 8.7–10.5)
CHLORIDE SERPL-SCNC: 102 MMOL/L (ref 95–110)
CO2 SERPL-SCNC: 36 MMOL/L (ref 23–29)
CREAT SERPL-MCNC: 0.9 MG/DL (ref 0.5–1.4)
DIFFERENTIAL METHOD: ABNORMAL
EOSINOPHIL # BLD AUTO: 0 K/UL (ref 0–0.5)
EOSINOPHIL NFR BLD: 0 % (ref 0–8)
ERYTHROCYTE [DISTWIDTH] IN BLOOD BY AUTOMATED COUNT: 14.8 % (ref 11.5–14.5)
EST. GFR  (AFRICAN AMERICAN): >60 ML/MIN/1.73 M^2
EST. GFR  (NON AFRICAN AMERICAN): >60 ML/MIN/1.73 M^2
GLUCOSE SERPL-MCNC: 189 MG/DL (ref 70–110)
HCT VFR BLD AUTO: 45.8 % (ref 37–48.5)
HGB BLD-MCNC: 13.6 G/DL (ref 12–16)
IMM GRANULOCYTES # BLD AUTO: 0.01 K/UL (ref 0–0.04)
IMM GRANULOCYTES NFR BLD AUTO: 0.2 % (ref 0–0.5)
LYMPHOCYTES # BLD AUTO: 1 K/UL (ref 1–4.8)
LYMPHOCYTES NFR BLD: 22 % (ref 18–48)
MAGNESIUM SERPL-MCNC: 2.2 MG/DL (ref 1.6–2.6)
MCH RBC QN AUTO: 29.1 PG (ref 27–31)
MCHC RBC AUTO-ENTMCNC: 29.7 G/DL (ref 32–36)
MCV RBC AUTO: 98 FL (ref 82–98)
MONOCYTES # BLD AUTO: 0.2 K/UL (ref 0.3–1)
MONOCYTES NFR BLD: 4.8 % (ref 4–15)
NEUTROPHILS # BLD AUTO: 3.4 K/UL (ref 1.8–7.7)
NEUTROPHILS NFR BLD: 73 % (ref 38–73)
NRBC BLD-RTO: 0 /100 WBC
PLATELET # BLD AUTO: 206 K/UL (ref 150–450)
PMV BLD AUTO: 9.6 FL (ref 9.2–12.9)
POTASSIUM SERPL-SCNC: 4.2 MMOL/L (ref 3.5–5.1)
PROT SERPL-MCNC: 6.5 G/DL (ref 6–8.4)
RBC # BLD AUTO: 4.68 M/UL (ref 4–5.4)
SODIUM SERPL-SCNC: 139 MMOL/L (ref 136–145)
WBC # BLD AUTO: 4.63 K/UL (ref 3.9–12.7)

## 2022-04-08 PROCEDURE — 27000221 HC OXYGEN, UP TO 24 HOURS

## 2022-04-08 PROCEDURE — 11000001 HC ACUTE MED/SURG PRIVATE ROOM

## 2022-04-08 PROCEDURE — 63600175 PHARM REV CODE 636 W HCPCS: Performed by: NURSE PRACTITIONER

## 2022-04-08 PROCEDURE — 99900035 HC TECH TIME PER 15 MIN (STAT)

## 2022-04-08 PROCEDURE — 99900031 HC PATIENT EDUCATION (STAT)

## 2022-04-08 PROCEDURE — 36415 COLL VENOUS BLD VENIPUNCTURE: CPT | Performed by: NURSE PRACTITIONER

## 2022-04-08 PROCEDURE — 85025 COMPLETE CBC W/AUTO DIFF WBC: CPT | Performed by: NURSE PRACTITIONER

## 2022-04-08 PROCEDURE — 80053 COMPREHEN METABOLIC PANEL: CPT | Performed by: NURSE PRACTITIONER

## 2022-04-08 PROCEDURE — 25000242 PHARM REV CODE 250 ALT 637 W/ HCPCS: Performed by: NURSE PRACTITIONER

## 2022-04-08 PROCEDURE — 83735 ASSAY OF MAGNESIUM: CPT | Performed by: NURSE PRACTITIONER

## 2022-04-08 PROCEDURE — 94761 N-INVAS EAR/PLS OXIMETRY MLT: CPT

## 2022-04-08 PROCEDURE — 25000003 PHARM REV CODE 250: Performed by: INTERNAL MEDICINE

## 2022-04-08 PROCEDURE — 25000003 PHARM REV CODE 250: Performed by: NURSE PRACTITIONER

## 2022-04-08 PROCEDURE — 94660 CPAP INITIATION&MGMT: CPT

## 2022-04-08 PROCEDURE — 94640 AIRWAY INHALATION TREATMENT: CPT

## 2022-04-08 RX ORDER — DULOXETIN HYDROCHLORIDE 60 MG/1
60 CAPSULE, DELAYED RELEASE ORAL DAILY
Status: DISCONTINUED | OUTPATIENT
Start: 2022-04-08 | End: 2022-04-12 | Stop reason: HOSPADM

## 2022-04-08 RX ORDER — MUPIROCIN 20 MG/G
OINTMENT TOPICAL 2 TIMES DAILY
Status: DISCONTINUED | OUTPATIENT
Start: 2022-04-08 | End: 2022-04-12 | Stop reason: HOSPADM

## 2022-04-08 RX ORDER — GABAPENTIN 300 MG/1
300 CAPSULE ORAL 2 TIMES DAILY
Status: DISCONTINUED | OUTPATIENT
Start: 2022-04-08 | End: 2022-04-12 | Stop reason: HOSPADM

## 2022-04-08 RX ORDER — LANOLIN ALCOHOL/MO/W.PET/CERES
1 CREAM (GRAM) TOPICAL DAILY
Status: DISCONTINUED | OUTPATIENT
Start: 2022-04-08 | End: 2022-04-12 | Stop reason: HOSPADM

## 2022-04-08 RX ORDER — ISOSORBIDE MONONITRATE 30 MG/1
60 TABLET, EXTENDED RELEASE ORAL DAILY
Status: DISCONTINUED | OUTPATIENT
Start: 2022-04-08 | End: 2022-04-12 | Stop reason: HOSPADM

## 2022-04-08 RX ORDER — FUROSEMIDE 40 MG/1
40 TABLET ORAL DAILY
Status: DISCONTINUED | OUTPATIENT
Start: 2022-04-08 | End: 2022-04-10

## 2022-04-08 RX ORDER — MEMANTINE HYDROCHLORIDE 10 MG/1
10 TABLET ORAL 2 TIMES DAILY
Status: DISCONTINUED | OUTPATIENT
Start: 2022-04-08 | End: 2022-04-12 | Stop reason: HOSPADM

## 2022-04-08 RX ORDER — DONEPEZIL HYDROCHLORIDE 5 MG/1
10 TABLET, FILM COATED ORAL NIGHTLY
Status: DISCONTINUED | OUTPATIENT
Start: 2022-04-08 | End: 2022-04-12 | Stop reason: HOSPADM

## 2022-04-08 RX ORDER — PRAVASTATIN SODIUM 40 MG/1
40 TABLET ORAL NIGHTLY
Status: DISCONTINUED | OUTPATIENT
Start: 2022-04-08 | End: 2022-04-12 | Stop reason: HOSPADM

## 2022-04-08 RX ADMIN — FERROUS SULFATE TAB 325 MG (65 MG ELEMENTAL FE) 1 EACH: 325 (65 FE) TAB at 10:04

## 2022-04-08 RX ADMIN — GABAPENTIN 300 MG: 300 CAPSULE ORAL at 09:04

## 2022-04-08 RX ADMIN — ONDANSETRON 4 MG: 2 INJECTION INTRAMUSCULAR; INTRAVENOUS at 04:04

## 2022-04-08 RX ADMIN — GABAPENTIN 300 MG: 300 CAPSULE ORAL at 01:04

## 2022-04-08 RX ADMIN — MEMANTINE 10 MG: 10 TABLET ORAL at 09:04

## 2022-04-08 RX ADMIN — IPRATROPIUM BROMIDE AND ALBUTEROL SULFATE 3 ML: 2.5; .5 SOLUTION RESPIRATORY (INHALATION) at 07:04

## 2022-04-08 RX ADMIN — METHYLPREDNISOLONE SODIUM SUCCINATE 80 MG: 125 INJECTION, POWDER, FOR SOLUTION INTRAMUSCULAR; INTRAVENOUS at 06:04

## 2022-04-08 RX ADMIN — ISOSORBIDE MONONITRATE 60 MG: 30 TABLET, EXTENDED RELEASE ORAL at 10:04

## 2022-04-08 RX ADMIN — MUPIROCIN: 20 OINTMENT TOPICAL at 09:04

## 2022-04-08 RX ADMIN — METHYLPREDNISOLONE SODIUM SUCCINATE 80 MG: 125 INJECTION, POWDER, FOR SOLUTION INTRAMUSCULAR; INTRAVENOUS at 09:04

## 2022-04-08 RX ADMIN — PRAVASTATIN SODIUM 40 MG: 40 TABLET ORAL at 09:04

## 2022-04-08 RX ADMIN — IPRATROPIUM BROMIDE AND ALBUTEROL SULFATE 3 ML: 2.5; .5 SOLUTION RESPIRATORY (INHALATION) at 01:04

## 2022-04-08 RX ADMIN — CEFTRIAXONE 1 G: 1 INJECTION, SOLUTION INTRAVENOUS at 12:04

## 2022-04-08 RX ADMIN — FUROSEMIDE 40 MG: 40 TABLET ORAL at 10:04

## 2022-04-08 RX ADMIN — ACETAMINOPHEN 650 MG: 325 TABLET ORAL at 09:04

## 2022-04-08 RX ADMIN — MUPIROCIN: 20 OINTMENT TOPICAL at 10:04

## 2022-04-08 RX ADMIN — DONEPEZIL HYDROCHLORIDE 10 MG: 5 TABLET ORAL at 09:04

## 2022-04-08 RX ADMIN — DULOXETINE HYDROCHLORIDE 60 MG: 60 CAPSULE, DELAYED RELEASE ORAL at 10:04

## 2022-04-08 RX ADMIN — MEMANTINE 10 MG: 10 TABLET ORAL at 10:04

## 2022-04-08 RX ADMIN — ACETAMINOPHEN 650 MG: 325 TABLET ORAL at 10:04

## 2022-04-08 RX ADMIN — METHYLPREDNISOLONE SODIUM SUCCINATE 80 MG: 125 INJECTION, POWDER, FOR SOLUTION INTRAMUSCULAR; INTRAVENOUS at 02:04

## 2022-04-08 NOTE — SUBJECTIVE & OBJECTIVE
Past Medical History:   Diagnosis Date    Alzheimer disease     Alzheimer disease     Coronary artery disease     Hyperlipidemia     Hypertension     Myopathy     Non-ST elevation (NSTEMI) myocardial infarction     Obesity     Pneumonia     Sleep apnea        Past Surgical History:   Procedure Laterality Date    APPENDECTOMY      APPENDECTOMY      CHOLECYSTECTOMY      CORONARY STENT PLACEMENT      HYSTERECTOMY      SURGICAL REMOVAL OF VERTEBRAL BODY OF THORACIC SPINE N/A 12/8/2020    Procedure: T9-T10 corpectomy, posterior instrumented fusion T7-T12.;  Surgeon: Everardo Gongora MD;  Location: [facility] OR [address] [place] [address];  Service: Neurosurgery;  Laterality: N/A;    TONSILLECTOMY         Review of patient's allergies indicates:   Allergen Reactions    Tizanidine        No current facility-administered medications on file prior to encounter.     Current Outpatient Medications on File Prior to Encounter   Medication Sig    acetaminophen (TYLENOL) 325 MG tablet Take 2 tablets (650 mg total) by mouth every 6 (six) hours as needed (HEADACHE, TEMPERATURE - FEVER > 100.4). (Patient not taking: Reported on 1/19/2021)    albuterol-ipratropium (DUO-NEB) 2.5 mg-0.5 mg/3 mL nebulizer solution Take 3 mLs by nebulization every 6 (six) hours as needed for Wheezing or Shortness of Breath. Rescue (Patient not taking: Reported on 1/19/2021)    aluminum & magnesium hydroxide-simethicone (MYLANTA MAX STRENGTH) 400-400-40 mg/5 mL suspension Take 30 mLs by mouth every 6 (six) hours as needed for Indigestion. (Patient not taking: Reported on 1/19/2021)    aspirin (ECOTRIN) 81 MG EC tablet Take 81 mg by mouth once daily.    bacitracin zinc 500 unit/gram OiPk Apply topically 2 (two) times daily.    clopidogreL (PLAVIX) 75 mg tablet Take 1 tablet (75 mg total) by mouth once daily.    donepeziL (ARICEPT) 10 MG tablet Take 10 mg by mouth every evening.    doxycycline (VIBRA-TABS) 100 MG tablet take one tablet by mouth twice daily (every 12 hours) for  infection until all gone. take with food. thank you!!!    DULoxetine (CYMBALTA) 60 MG capsule Take 60 mg by mouth once daily.    ferrous sulfate 324 mg (65 mg iron) TbEC Take 325 mg by mouth every evening.    furosemide (LASIX) 40 MG tablet Take 1 tablet (40 mg total) by mouth once daily.    isosorbide mononitrate (IMDUR) 60 MG 24 hr tablet Take 60 mg by mouth once daily.    memantine (NAMENDA) 10 MG Tab Take 1 tablet (10 mg total) by mouth 2 (two) times daily.    methocarbamoL (ROBAXIN) 750 MG Tab Take 1 tablet (750 mg total) by mouth 3 (three) times daily as needed.    miconazole NITRATE 2 % (MICOTIN) 2 % top powder Apply topically 2 (two) times daily. for 15 days    ondansetron (ZOFRAN-ODT) 4 MG TbDL Take 1 tablet (4 mg total) by mouth every 8 (eight) hours as needed. (Patient not taking: Reported on 2021)    oxyCODONE (ROXICODONE) 10 mg Tab immediate release tablet Take 1 tablet (10 mg total) by mouth every 6 (six) hours as needed for Pain.    pantoprazole (PROTONIX) 40 MG tablet Take 1 tablet (40 mg total) by mouth before breakfast.    potassium chloride SA (K-DUR,KLOR-CON) 20 MEQ tablet Take 1 tablet (20 mEq total) by mouth once daily.    pravastatin (PRAVACHOL) 40 MG tablet Take 40 mg by mouth every evening.    senna-docusate 8.6-50 mg (PERICOLACE) 8.6-50 mg per tablet Take 2 tablets by mouth nightly as needed for Constipation.     Family History       Problem Relation (Age of Onset)    Hypertension Father    Stroke Mother          Tobacco Use    Smoking status: Former Smoker     Types: Cigarettes     Quit date: 2000     Years since quittin.2    Smokeless tobacco: Never Used   Substance and Sexual Activity    Alcohol use: Not Currently    Drug use: Never    Sexual activity: Not Currently     Review of Systems   Constitutional:  Negative for activity change, appetite change, chills, diaphoresis, fatigue, fever and unexpected weight change.   HENT:  Negative for congestion, postnasal drip, sore  throat and trouble swallowing.    Eyes:  Negative for discharge.   Respiratory:  Positive for cough and shortness of breath. Negative for chest tightness and wheezing.    Cardiovascular:  Negative for chest pain, palpitations and leg swelling.   Gastrointestinal:  Negative for abdominal pain, blood in stool, constipation, diarrhea, nausea and vomiting.   Genitourinary:  Negative for decreased urine volume, difficulty urinating, dysuria, hematuria and urgency.   Musculoskeletal:  Negative for arthralgias.   Skin:  Negative for rash and wound.   Neurological:  Negative for dizziness, speech difficulty, numbness and headaches.   Psychiatric/Behavioral:  Negative for agitation and sleep disturbance.    Objective:     Vital Signs (Most Recent):  Temp: 98.9 °F (37.2 °C) (04/08/22 0525)  Pulse: 67 (04/08/22 0755)  Resp: 18 (04/08/22 0755)  BP: (!) 110/57 (04/08/22 0525)  SpO2: 96 % (04/08/22 0755)   Vital Signs (24h Range):  Temp:  [98.1 °F (36.7 °C)-99.2 °F (37.3 °C)] 98.9 °F (37.2 °C)  Pulse:  [63-74] 67  Resp:  [8-21] 18  SpO2:  [91 %-99 %] 96 %  BP: (105-145)/(57-75) 110/57     Weight: (!) 161.9 kg (357 lb)  Body mass index is 52.72 kg/m².    Physical Exam  Vitals and nursing note reviewed.   Constitutional:       Appearance: Normal appearance. She is obese. She is ill-appearing.   HENT:      Head: Normocephalic and atraumatic.      Right Ear: Hearing normal.      Left Ear: Hearing normal.      Nose: Nose normal.      Mouth/Throat:      Lips: Pink.      Mouth: Mucous membranes are moist.   Eyes:      Pupils: Pupils are equal, round, and reactive to light.   Cardiovascular:      Rate and Rhythm: Normal rate and regular rhythm.      Pulses: Normal pulses.      Heart sounds: Normal heart sounds.   Pulmonary:      Effort: Pulmonary effort is normal.      Breath sounds: Normal breath sounds.      Comments: Continuous oxygen, bipap prn  Abdominal:      General: Abdomen is flat. Bowel sounds are normal. There is no  distension.      Palpations: Abdomen is soft.      Tenderness: There is no abdominal tenderness.   Musculoskeletal:         General: Normal range of motion.      Cervical back: Full passive range of motion without pain and normal range of motion.   Skin:     General: Skin is warm and dry.      Capillary Refill: Capillary refill takes less than 2 seconds.   Neurological:      General: No focal deficit present.      Mental Status: She is oriented to person, place, and time. Mental status is at baseline. She is lethargic.      Cranial Nerves: Cranial nerves are intact.   Psychiatric:         Speech: She is noncommunicative.         CRANIAL NERVES     CN III, IV, VI   Pupils are equal, round, and reactive to light.     Significant Labs: All pertinent labs within the past 24 hours have been reviewed.  Recent Lab Results         04/08/22  0908        Baso # 0.00       Basophil % 0.0       Differential Method Automated       Eos # 0.0       Eosinophil % 0.0       Gran # (ANC) 3.4       Gran % 73.0       Hematocrit 45.8       Hemoglobin 13.6       Immature Grans (Abs) 0.01  Comment: Mild elevation in immature granulocytes is non specific and   can be seen in a variety of conditions including stress response,   acute inflammation, trauma and pregnancy. Correlation with other   laboratory and clinical findings is essential.         Immature Granulocytes 0.2       Lymph # 1.0       Lymph % 22.0       MCH 29.1       MCHC 29.7       MCV 98       Mono # 0.2       Mono % 4.8       MPV 9.6       nRBC 0       Platelets 206       RBC 4.68       RDW 14.8       WBC 4.63               Significant Imaging:   X-Ray Chest AP Portable  Narrative: EXAMINATION:  XR CHEST AP PORTABLE    CLINICAL HISTORY:  Sepsis;    COMPARISON:  03/14/2022    FINDINGS:  Increased hazy attenuation left lower lung zone suspicious for increased pleural fluid adjacent atelectasis or infiltrate.  Prominent cardiomediastinal silhouette has not significantly changed.   Mild vascular congestion.  Impression: Increased attenuation left lower hemithorax suspicious for increased pleural fluid adjacent atelectasis and or infiltrate    Prominent cardiomediastinal silhouette and vascular congestion, similar to prior    Electronically signed by: Fior Cummins MD  Date:    04/07/2022  Time:    09:12

## 2022-04-08 NOTE — ASSESSMENT & PLAN NOTE
This patient does have evidence of infective focus  My overall impression is sepsis. Vital signs were reviewed and noted in progress note.  Antibiotics given-   Antibiotics (From admission, onward)            Start     Stop Route Frequency Ordered    04/07/22 0031  cefTRIAXone (ROCEPHIN) 1 g/50 mL D5W IVPB         -- IV Every 24 hours (non-standard times) 04/07/22 0031        Cultures were taken-   Microbiology Results (last 7 days)     Procedure Component Value Units Date/Time    Blood culture x two cultures. Draw prior to antibiotics. [[id number]] Collected: 04/06/22 2159    Order Status: Completed Specimen: Blood Updated: 04/08/22 0613     Blood Culture, Routine No Growth to date      No Growth to date    Narrative:      Aerobic and anaerobic    Blood culture x two cultures. Draw prior to antibiotics. [[id number]] Collected: 04/06/22 2207    Order Status: Completed Specimen: Blood Updated: 04/08/22 0613     Blood Culture, Routine No Growth to date      No Growth to date    Narrative:      Aerobic and anaerobic    Urine culture [[id number]] Collected: 04/06/22 2219    Order Status: No result Specimen: Urine Updated: 04/06/22 2228        Latest lactate reviewed, they are-  Recent Labs   Lab 04/06/22 2207   LACTATE 1.0       Organ dysfunction indicated by Encephalopathy  and Acute respiratory failure

## 2022-04-08 NOTE — ASSESSMENT & PLAN NOTE
Patient with Hypercapnic and Hypoxic Respiratory failure which is Acute on chronic.  she is on home oxygen at 3  LPM. Supplemental oxygen was provided and noted- Oxygen Concentration (%):  [36-50] 36.   Signs/symptoms of respiratory failure include- tachypnea, use of accessory muscles and lethargy. Contributing diagnoses includes - COPD, Interstitial lung disease and Obesity Hypoventilation Labs and images were reviewed. Patient Has recent ABG, which has been reviewed. Will treat underlying causes and adjust management of respiratory failure as follows-     Continue Oxygen via bipap

## 2022-04-08 NOTE — PROGRESS NOTES
[facility] Medicine  Progress Note    Patient Name: Martina Betancourt  MRN: [id number]  Patient Class: IP- Inpatient   Admission Date: 4/6/2022  Length of Stay: 2 days  Attending Physician: Joe Fuller III, MD  Primary Care Provider: Joe Fuller III, MD        Subjective:     Principal Problem:Sepsis        HPI:  72 yo female with extensive pmhx including dementia, chronic respiratory failure on home O2 is here via EMS from home with complaint of SOB and CP x 1 day. No aggravating or alleviating factors. EMS reports sat on 2L was 66%. Given duoneb abd solumedrol en route. Similar past presentations. Dementia limits this history. Spoke with Sharon aranda daughter for history and reports that she is DNR      Overview/Hospital Course:  4/8/22 [facility] patient was awake and alert eating breakfast and reports feeling better. Complains of cough      Past Medical History:   Diagnosis Date    Alzheimer disease     Alzheimer disease     Coronary artery disease     Hyperlipidemia     Hypertension     Myopathy     Non-ST elevation (NSTEMI) myocardial infarction     Obesity     Pneumonia     Sleep apnea        Past Surgical History:   Procedure Laterality Date    APPENDECTOMY      APPENDECTOMY      CHOLECYSTECTOMY      CORONARY STENT PLACEMENT      HYSTERECTOMY      SURGICAL REMOVAL OF VERTEBRAL BODY OF THORACIC SPINE N/A 12/8/2020    Procedure: T9-T10 corpectomy, posterior instrumented fusion T7-T12.;  Surgeon: Everardo Gongora MD;  Location: [facility] OR [address] [place] [address];  Service: Neurosurgery;  Laterality: N/A;    TONSILLECTOMY         Review of patient's allergies indicates:   Allergen Reactions    Tizanidine        No current facility-administered medications on file prior to encounter.     Current Outpatient Medications on File Prior to Encounter   Medication Sig    acetaminophen (TYLENOL) 325 MG tablet Take 2 tablets (650 mg total) by mouth every 6 (six) hours as needed (HEADACHE, TEMPERATURE -  FEVER > 100.4). (Patient not taking: Reported on 1/19/2021)    albuterol-ipratropium (DUO-NEB) 2.5 mg-0.5 mg/3 mL nebulizer solution Take 3 mLs by nebulization every 6 (six) hours as needed for Wheezing or Shortness of Breath. Rescue (Patient not taking: Reported on 1/19/2021)    aluminum & magnesium hydroxide-simethicone (MYLANTA MAX STRENGTH) 400-400-40 mg/5 mL suspension Take 30 mLs by mouth every 6 (six) hours as needed for Indigestion. (Patient not taking: Reported on 1/19/2021)    aspirin (ECOTRIN) 81 MG EC tablet Take 81 mg by mouth once daily.    bacitracin zinc 500 unit/gram OiPk Apply topically 2 (two) times daily.    clopidogreL (PLAVIX) 75 mg tablet Take 1 tablet (75 mg total) by mouth once daily.    donepeziL (ARICEPT) 10 MG tablet Take 10 mg by mouth every evening.    doxycycline (VIBRA-TABS) 100 MG tablet take one tablet by mouth twice daily (every 12 hours) for infection until all gone. take with food. thank you!!!    DULoxetine (CYMBALTA) 60 MG capsule Take 60 mg by mouth once daily.    ferrous sulfate 324 mg (65 mg iron) TbEC Take 325 mg by mouth every evening.    furosemide (LASIX) 40 MG tablet Take 1 tablet (40 mg total) by mouth once daily.    isosorbide mononitrate (IMDUR) 60 MG 24 hr tablet Take 60 mg by mouth once daily.    memantine (NAMENDA) 10 MG Tab Take 1 tablet (10 mg total) by mouth 2 (two) times daily.    methocarbamoL (ROBAXIN) 750 MG Tab Take 1 tablet (750 mg total) by mouth 3 (three) times daily as needed.    miconazole NITRATE 2 % (MICOTIN) 2 % top powder Apply topically 2 (two) times daily. for 15 days    ondansetron (ZOFRAN-ODT) 4 MG TbDL Take 1 tablet (4 mg total) by mouth every 8 (eight) hours as needed. (Patient not taking: Reported on 1/19/2021)    oxyCODONE (ROXICODONE) 10 mg Tab immediate release tablet Take 1 tablet (10 mg total) by mouth every 6 (six) hours as needed for Pain.    pantoprazole (PROTONIX) 40 MG tablet Take 1 tablet (40 mg total) by mouth  before breakfast.    potassium chloride SA (K-DUR,KLOR-CON) 20 MEQ tablet Take 1 tablet (20 mEq total) by mouth once daily.    pravastatin (PRAVACHOL) 40 MG tablet Take 40 mg by mouth every evening.    senna-docusate 8.6-50 mg (PERICOLACE) 8.6-50 mg per tablet Take 2 tablets by mouth nightly as needed for Constipation.     Family History       Problem Relation (Age of Onset)    Hypertension Father    Stroke Mother          Tobacco Use    Smoking status: Former Smoker     Types: Cigarettes     Quit date: 2000     Years since quittin.2    Smokeless tobacco: Never Used   Substance and Sexual Activity    Alcohol use: Not Currently    Drug use: Never    Sexual activity: Not Currently     Review of Systems   Constitutional:  Negative for activity change, appetite change, chills, diaphoresis, fatigue, fever and unexpected weight change.   HENT:  Negative for congestion, postnasal drip, sore throat and trouble swallowing.    Eyes:  Negative for discharge.   Respiratory:  Positive for cough and shortness of breath. Negative for chest tightness and wheezing.    Cardiovascular:  Negative for chest pain, palpitations and leg swelling.   Gastrointestinal:  Negative for abdominal pain, blood in stool, constipation, diarrhea, nausea and vomiting.   Genitourinary:  Negative for decreased urine volume, difficulty urinating, dysuria, hematuria and urgency.   Musculoskeletal:  Negative for arthralgias.   Skin:  Negative for rash and wound.   Neurological:  Negative for dizziness, speech difficulty, numbness and headaches.   Psychiatric/Behavioral:  Negative for agitation and sleep disturbance.    Objective:     Vital Signs (Most Recent):  Temp: 98.9 °F (37.2 °C) (22 0525)  Pulse: 67 (22 0755)  Resp: 18 (22 0755)  BP: (!) 110/57 (22 0525)  SpO2: 96 % (22 0755)   Vital Signs (24h Range):  Temp:  [98.1 °F (36.7 °C)-99.2 °F (37.3 °C)] 98.9 °F (37.2 °C)  Pulse:  [63-74] 67  Resp:  [8-21]  18  SpO2:  [91 %-99 %] 96 %  BP: (105-145)/(57-75) 110/57     Weight: (!) 161.9 kg (357 lb)  Body mass index is 52.72 kg/m².    Physical Exam  Vitals and nursing note reviewed.   Constitutional:       Appearance: Normal appearance. She is obese. She is ill-appearing.   HENT:      Head: Normocephalic and atraumatic.      Right Ear: Hearing normal.      Left Ear: Hearing normal.      Nose: Nose normal.      Mouth/Throat:      Lips: Pink.      Mouth: Mucous membranes are moist.   Eyes:      Pupils: Pupils are equal, round, and reactive to light.   Cardiovascular:      Rate and Rhythm: Normal rate and regular rhythm.      Pulses: Normal pulses.      Heart sounds: Normal heart sounds.   Pulmonary:      Effort: Pulmonary effort is normal.      Breath sounds: Normal breath sounds.      Comments: Continuous oxygen, bipap prn  Abdominal:      General: Abdomen is flat. Bowel sounds are normal. There is no distension.      Palpations: Abdomen is soft.      Tenderness: There is no abdominal tenderness.   Musculoskeletal:         General: Normal range of motion.      Cervical back: Full passive range of motion without pain and normal range of motion.   Skin:     General: Skin is warm and dry.      Capillary Refill: Capillary refill takes less than 2 seconds.   Neurological:      General: No focal deficit present.      Mental Status: She is oriented to person, place, and time. Mental status is at baseline. She is lethargic.      Cranial Nerves: Cranial nerves are intact.   Psychiatric:         Speech: She is noncommunicative.         CRANIAL NERVES     CN III, IV, VI   Pupils are equal, round, and reactive to light.     Significant Labs: All pertinent labs within the past 24 hours have been reviewed.  Recent Lab Results         04/08/22  0908        Baso # 0.00       Basophil % 0.0       Differential Method Automated       Eos # 0.0       Eosinophil % 0.0       Gran # (ANC) 3.4       Gran % 73.0       Hematocrit 45.8        Hemoglobin 13.6       Immature Grans (Abs) 0.01  Comment: Mild elevation in immature granulocytes is non specific and   can be seen in a variety of conditions including stress response,   acute inflammation, trauma and pregnancy. Correlation with other   laboratory and clinical findings is essential.         Immature Granulocytes 0.2       Lymph # 1.0       Lymph % 22.0       MCH 29.1       MCHC 29.7       MCV 98       Mono # 0.2       Mono % 4.8       MPV 9.6       nRBC 0       Platelets 206       RBC 4.68       RDW 14.8       WBC 4.63               Significant Imaging:   X-Ray Chest AP Portable  Narrative: EXAMINATION:  XR CHEST AP PORTABLE    CLINICAL HISTORY:  Sepsis;    COMPARISON:  03/14/2022    FINDINGS:  Increased hazy attenuation left lower lung zone suspicious for increased pleural fluid adjacent atelectasis or infiltrate.  Prominent cardiomediastinal silhouette has not significantly changed.  Mild vascular congestion.  Impression: Increased attenuation left lower hemithorax suspicious for increased pleural fluid adjacent atelectasis and or infiltrate    Prominent cardiomediastinal silhouette and vascular congestion, similar to prior    Electronically signed by: Fior Cummins MD  Date:    04/07/2022  Time:    09:12       Assessment/Plan:      * Sepsis  This patient does have evidence of infective focus  My overall impression is sepsis. Vital signs were reviewed and noted in progress note.  Antibiotics given-   Antibiotics (From admission, onward)            Start     Stop Route Frequency Ordered    04/07/22 0031  cefTRIAXone (ROCEPHIN) 1 g/50 mL D5W IVPB         -- IV Every 24 hours (non-standard times) 04/07/22 0031        Cultures were taken-   Microbiology Results (last 7 days)     Procedure Component Value Units Date/Time    Blood culture x two cultures. Draw prior to antibiotics. [[id number]] Collected: 04/06/22 2159    Order Status: Completed Specimen: Blood Updated: 04/08/22 0613     Blood Culture,  Routine No Growth to date      No Growth to date    Narrative:      Aerobic and anaerobic    Blood culture x two cultures. Draw prior to antibiotics. [[id number]] Collected: 04/06/22 2207    Order Status: Completed Specimen: Blood Updated: 04/08/22 0613     Blood Culture, Routine No Growth to date      No Growth to date    Narrative:      Aerobic and anaerobic    Urine culture [[id number]] Collected: 04/06/22 2219    Order Status: No result Specimen: Urine Updated: 04/06/22 2228        Latest lactate reviewed, they are-  Recent Labs   Lab 04/06/22 2207   LACTATE 1.0       Organ dysfunction indicated by Encephalopathy  and Acute respiratory failure    AMS (altered mental status)  Resolved likely at baseline      Bedbound        Acute on chronic respiratory failure with hypoxia and hypercapnia  Patient with Hypercapnic and Hypoxic Respiratory failure which is Acute on chronic.  she is on home oxygen at 3  LPM. Supplemental oxygen was provided and noted- Oxygen Concentration (%):  [36-50] 36.   Signs/symptoms of respiratory failure include- tachypnea, use of accessory muscles and lethargy. Contributing diagnoses includes - COPD, Interstitial lung disease and Obesity Hypoventilation Labs and images were reviewed. Patient Has recent ABG, which has been reviewed. Will treat underlying causes and adjust management of respiratory failure as follows-     Continue Oxygen via bipap    Urinary tract infection without hematuria  Continue rocephin and await final urine cultures      Severe obesity (BMI >= 40)  Body mass index is 52.72 kg/m². Morbid obesity complicates all aspects of disease management from diagnostic modalities to treatment. Weight loss encouraged and health benefits explained to patient.         Dementia without behavioral disturbance          VTE Risk Mitigation (From admission, onward)         Ordered     IP VTE HIGH RISK PATIENT  Once         04/07/22 0031     Place sequential compression device  Until  discontinued         04/07/22 0031                Discharge Planning   BRIT:      Code Status: DNR   Is the patient medically ready for discharge?:     Reason for patient still in hospital (select all that apply): Patient trending condition, Laboratory test, Treatment and PT / OT recommendations  Discharge Plan A: Home with family, Home Health                  Livia Rivera NP  Department of Hospital Medicine   [facility]

## 2022-04-09 LAB
ALBUMIN SERPL BCP-MCNC: 2.7 G/DL (ref 3.5–5.2)
ALP SERPL-CCNC: 97 U/L (ref 55–135)
ALT SERPL W/O P-5'-P-CCNC: 13 U/L (ref 10–44)
ANION GAP SERPL CALC-SCNC: 1 MMOL/L (ref 8–16)
AST SERPL-CCNC: 5 U/L (ref 10–40)
BASOPHILS # BLD AUTO: 0 K/UL (ref 0–0.2)
BASOPHILS NFR BLD: 0 % (ref 0–1.9)
BILIRUB SERPL-MCNC: 0.2 MG/DL (ref 0.1–1)
BUN SERPL-MCNC: 19 MG/DL (ref 8–23)
CALCIUM SERPL-MCNC: 8.7 MG/DL (ref 8.7–10.5)
CHLORIDE SERPL-SCNC: 99 MMOL/L (ref 95–110)
CO2 SERPL-SCNC: 37 MMOL/L (ref 23–29)
CREAT SERPL-MCNC: 0.8 MG/DL (ref 0.5–1.4)
DIFFERENTIAL METHOD: ABNORMAL
EOSINOPHIL # BLD AUTO: 0 K/UL (ref 0–0.5)
EOSINOPHIL NFR BLD: 0 % (ref 0–8)
ERYTHROCYTE [DISTWIDTH] IN BLOOD BY AUTOMATED COUNT: 14.9 % (ref 11.5–14.5)
EST. GFR  (AFRICAN AMERICAN): >60 ML/MIN/1.73 M^2
EST. GFR  (NON AFRICAN AMERICAN): >60 ML/MIN/1.73 M^2
GLUCOSE SERPL-MCNC: 148 MG/DL (ref 70–110)
HCT VFR BLD AUTO: 42.9 % (ref 37–48.5)
HGB BLD-MCNC: 12.5 G/DL (ref 12–16)
IMM GRANULOCYTES # BLD AUTO: 0.02 K/UL (ref 0–0.04)
IMM GRANULOCYTES NFR BLD AUTO: 0.3 % (ref 0–0.5)
LYMPHOCYTES # BLD AUTO: 0.9 K/UL (ref 1–4.8)
LYMPHOCYTES NFR BLD: 12.6 % (ref 18–48)
MAGNESIUM SERPL-MCNC: 2.3 MG/DL (ref 1.6–2.6)
MCH RBC QN AUTO: 27.9 PG (ref 27–31)
MCHC RBC AUTO-ENTMCNC: 29.1 G/DL (ref 32–36)
MCV RBC AUTO: 96 FL (ref 82–98)
MONOCYTES # BLD AUTO: 0.5 K/UL (ref 0.3–1)
MONOCYTES NFR BLD: 7 % (ref 4–15)
NEUTROPHILS # BLD AUTO: 5.4 K/UL (ref 1.8–7.7)
NEUTROPHILS NFR BLD: 80.1 % (ref 38–73)
NRBC BLD-RTO: 0 /100 WBC
PLATELET # BLD AUTO: 243 K/UL (ref 150–450)
PMV BLD AUTO: 9.9 FL (ref 9.2–12.9)
POTASSIUM SERPL-SCNC: 4.4 MMOL/L (ref 3.5–5.1)
PROT SERPL-MCNC: 6.2 G/DL (ref 6–8.4)
RBC # BLD AUTO: 4.48 M/UL (ref 4–5.4)
SODIUM SERPL-SCNC: 137 MMOL/L (ref 136–145)
WBC # BLD AUTO: 6.76 K/UL (ref 3.9–12.7)

## 2022-04-09 PROCEDURE — 25000003 PHARM REV CODE 250: Performed by: INTERNAL MEDICINE

## 2022-04-09 PROCEDURE — 36415 COLL VENOUS BLD VENIPUNCTURE: CPT | Performed by: NURSE PRACTITIONER

## 2022-04-09 PROCEDURE — 85025 COMPLETE CBC W/AUTO DIFF WBC: CPT | Performed by: NURSE PRACTITIONER

## 2022-04-09 PROCEDURE — 63600175 PHARM REV CODE 636 W HCPCS: Performed by: NURSE PRACTITIONER

## 2022-04-09 PROCEDURE — 94761 N-INVAS EAR/PLS OXIMETRY MLT: CPT

## 2022-04-09 PROCEDURE — 99900035 HC TECH TIME PER 15 MIN (STAT)

## 2022-04-09 PROCEDURE — 94660 CPAP INITIATION&MGMT: CPT

## 2022-04-09 PROCEDURE — 25000242 PHARM REV CODE 250 ALT 637 W/ HCPCS: Performed by: NURSE PRACTITIONER

## 2022-04-09 PROCEDURE — 94640 AIRWAY INHALATION TREATMENT: CPT

## 2022-04-09 PROCEDURE — 11000001 HC ACUTE MED/SURG PRIVATE ROOM

## 2022-04-09 PROCEDURE — 99900031 HC PATIENT EDUCATION (STAT)

## 2022-04-09 PROCEDURE — 27000221 HC OXYGEN, UP TO 24 HOURS

## 2022-04-09 PROCEDURE — 80053 COMPREHEN METABOLIC PANEL: CPT | Performed by: NURSE PRACTITIONER

## 2022-04-09 PROCEDURE — 63600175 PHARM REV CODE 636 W HCPCS: Performed by: INTERNAL MEDICINE

## 2022-04-09 PROCEDURE — 25000003 PHARM REV CODE 250: Performed by: NURSE PRACTITIONER

## 2022-04-09 PROCEDURE — C9113 INJ PANTOPRAZOLE SODIUM, VIA: HCPCS | Performed by: INTERNAL MEDICINE

## 2022-04-09 PROCEDURE — 83735 ASSAY OF MAGNESIUM: CPT | Performed by: NURSE PRACTITIONER

## 2022-04-09 RX ORDER — HYDROCODONE BITARTRATE AND ACETAMINOPHEN 5; 325 MG/1; MG/1
1 TABLET ORAL EVERY 8 HOURS PRN
Status: DISCONTINUED | OUTPATIENT
Start: 2022-04-09 | End: 2022-04-12 | Stop reason: HOSPADM

## 2022-04-09 RX ORDER — MAG HYDROX/ALUMINUM HYD/SIMETH 200-200-20
30 SUSPENSION, ORAL (FINAL DOSE FORM) ORAL EVERY 6 HOURS
Status: DISCONTINUED | OUTPATIENT
Start: 2022-04-09 | End: 2022-04-10

## 2022-04-09 RX ORDER — PANTOPRAZOLE SODIUM 40 MG/10ML
40 INJECTION, POWDER, LYOPHILIZED, FOR SOLUTION INTRAVENOUS ONCE
Status: COMPLETED | OUTPATIENT
Start: 2022-04-09 | End: 2022-04-09

## 2022-04-09 RX ORDER — BENZONATATE 100 MG/1
100 CAPSULE ORAL 3 TIMES DAILY PRN
Status: DISCONTINUED | OUTPATIENT
Start: 2022-04-09 | End: 2022-04-12 | Stop reason: HOSPADM

## 2022-04-09 RX ADMIN — METHYLPREDNISOLONE SODIUM SUCCINATE 80 MG: 125 INJECTION, POWDER, FOR SOLUTION INTRAMUSCULAR; INTRAVENOUS at 05:04

## 2022-04-09 RX ADMIN — FUROSEMIDE 40 MG: 40 TABLET ORAL at 09:04

## 2022-04-09 RX ADMIN — IPRATROPIUM BROMIDE AND ALBUTEROL SULFATE 3 ML: 2.5; .5 SOLUTION RESPIRATORY (INHALATION) at 07:04

## 2022-04-09 RX ADMIN — METHYLPREDNISOLONE SODIUM SUCCINATE 80 MG: 125 INJECTION, POWDER, FOR SOLUTION INTRAMUSCULAR; INTRAVENOUS at 03:04

## 2022-04-09 RX ADMIN — CEFTRIAXONE 1 G: 1 INJECTION, SOLUTION INTRAVENOUS at 12:04

## 2022-04-09 RX ADMIN — ACETAMINOPHEN 650 MG: 325 TABLET ORAL at 09:04

## 2022-04-09 RX ADMIN — ISOSORBIDE MONONITRATE 60 MG: 30 TABLET, EXTENDED RELEASE ORAL at 09:04

## 2022-04-09 RX ADMIN — ALUMINUM HYDROXIDE, MAGNESIUM HYDROXIDE, AND SIMETHICONE 30 ML: 200; 200; 20 SUSPENSION ORAL at 09:04

## 2022-04-09 RX ADMIN — IPRATROPIUM BROMIDE AND ALBUTEROL SULFATE 3 ML: 2.5; .5 SOLUTION RESPIRATORY (INHALATION) at 02:04

## 2022-04-09 RX ADMIN — ACETAMINOPHEN 650 MG: 325 TABLET ORAL at 06:04

## 2022-04-09 RX ADMIN — PANTOPRAZOLE SODIUM 40 MG: 40 INJECTION, POWDER, FOR SOLUTION INTRAVENOUS at 09:04

## 2022-04-09 RX ADMIN — DULOXETINE HYDROCHLORIDE 60 MG: 60 CAPSULE, DELAYED RELEASE ORAL at 09:04

## 2022-04-09 RX ADMIN — HYDROCODONE BITARTRATE AND ACETAMINOPHEN 1 TABLET: 5; 325 TABLET ORAL at 09:04

## 2022-04-09 RX ADMIN — HYDROCODONE BITARTRATE AND ACETAMINOPHEN 1 TABLET: 5; 325 TABLET ORAL at 11:04

## 2022-04-09 RX ADMIN — MUPIROCIN: 20 OINTMENT TOPICAL at 09:04

## 2022-04-09 RX ADMIN — PRAVASTATIN SODIUM 40 MG: 40 TABLET ORAL at 09:04

## 2022-04-09 RX ADMIN — MEMANTINE 10 MG: 10 TABLET ORAL at 09:04

## 2022-04-09 RX ADMIN — DONEPEZIL HYDROCHLORIDE 10 MG: 5 TABLET ORAL at 09:04

## 2022-04-09 RX ADMIN — GABAPENTIN 300 MG: 300 CAPSULE ORAL at 09:04

## 2022-04-09 RX ADMIN — FERROUS SULFATE TAB 325 MG (65 MG ELEMENTAL FE) 1 EACH: 325 (65 FE) TAB at 09:04

## 2022-04-09 RX ADMIN — METHYLPREDNISOLONE SODIUM SUCCINATE 80 MG: 125 INJECTION, POWDER, FOR SOLUTION INTRAMUSCULAR; INTRAVENOUS at 09:04

## 2022-04-09 NOTE — ASSESSMENT & PLAN NOTE
Patient with Hypercapnic and Hypoxic Respiratory failure which is Acute on chronic.  she is on home oxygen at 3  LPM. Supplemental oxygen was provided and noted- Oxygen Concentration (%):  [36-50] 36.   Signs/symptoms of respiratory failure include- tachypnea, use of accessory muscles and lethargy. Contributing diagnoses includes - COPD, Interstitial lung disease and Obesity Hypoventilation Labs and images were reviewed. Patient Has recent ABG, which has been reviewed. Will treat underlying causes and adjust management of respiratory failure as follows-     Continue Oxygen via bipap  4/9 improving patient is getting IV steroids IV antibiotics she is on her oxygen and less use of the BiPAP

## 2022-04-09 NOTE — SUBJECTIVE & OBJECTIVE
Past Medical History:   Diagnosis Date    Alzheimer disease     Alzheimer disease     Coronary artery disease     Hyperlipidemia     Hypertension     Myopathy     Non-ST elevation (NSTEMI) myocardial infarction     Obesity     Pneumonia     Sleep apnea        Past Surgical History:   Procedure Laterality Date    APPENDECTOMY      APPENDECTOMY      CHOLECYSTECTOMY      CORONARY STENT PLACEMENT      HYSTERECTOMY      SURGICAL REMOVAL OF VERTEBRAL BODY OF THORACIC SPINE N/A 12/8/2020    Procedure: T9-T10 corpectomy, posterior instrumented fusion T7-T12.;  Surgeon: Everardo Gongora MD;  Location: [facility] OR [address] [place] [address];  Service: Neurosurgery;  Laterality: N/A;    TONSILLECTOMY         Review of patient's allergies indicates:   Allergen Reactions    Tizanidine        No current facility-administered medications on file prior to encounter.     Current Outpatient Medications on File Prior to Encounter   Medication Sig    acetaminophen (TYLENOL) 325 MG tablet Take 2 tablets (650 mg total) by mouth every 6 (six) hours as needed (HEADACHE, TEMPERATURE - FEVER > 100.4). (Patient not taking: Reported on 1/19/2021)    albuterol-ipratropium (DUO-NEB) 2.5 mg-0.5 mg/3 mL nebulizer solution Take 3 mLs by nebulization every 6 (six) hours as needed for Wheezing or Shortness of Breath. Rescue (Patient not taking: Reported on 1/19/2021)    aluminum & magnesium hydroxide-simethicone (MYLANTA MAX STRENGTH) 400-400-40 mg/5 mL suspension Take 30 mLs by mouth every 6 (six) hours as needed for Indigestion. (Patient not taking: Reported on 1/19/2021)    aspirin (ECOTRIN) 81 MG EC tablet Take 81 mg by mouth once daily.    bacitracin zinc 500 unit/gram OiPk Apply topically 2 (two) times daily.    clopidogreL (PLAVIX) 75 mg tablet Take 1 tablet (75 mg total) by mouth once daily.    donepeziL (ARICEPT) 10 MG tablet Take 10 mg by mouth every evening.    doxycycline (VIBRA-TABS) 100 MG tablet take one tablet by mouth twice daily (every 12 hours) for  infection until all gone. take with food. thank you!!!    DULoxetine (CYMBALTA) 60 MG capsule Take 60 mg by mouth once daily.    ferrous sulfate 324 mg (65 mg iron) TbEC Take 325 mg by mouth every evening.    furosemide (LASIX) 40 MG tablet Take 1 tablet (40 mg total) by mouth once daily.    isosorbide mononitrate (IMDUR) 60 MG 24 hr tablet Take 60 mg by mouth once daily.    memantine (NAMENDA) 10 MG Tab Take 1 tablet (10 mg total) by mouth 2 (two) times daily.    methocarbamoL (ROBAXIN) 750 MG Tab Take 1 tablet (750 mg total) by mouth 3 (three) times daily as needed.    miconazole NITRATE 2 % (MICOTIN) 2 % top powder Apply topically 2 (two) times daily. for 15 days    ondansetron (ZOFRAN-ODT) 4 MG TbDL Take 1 tablet (4 mg total) by mouth every 8 (eight) hours as needed. (Patient not taking: Reported on 2021)    oxyCODONE (ROXICODONE) 10 mg Tab immediate release tablet Take 1 tablet (10 mg total) by mouth every 6 (six) hours as needed for Pain.    pantoprazole (PROTONIX) 40 MG tablet Take 1 tablet (40 mg total) by mouth before breakfast.    potassium chloride SA (K-DUR,KLOR-CON) 20 MEQ tablet Take 1 tablet (20 mEq total) by mouth once daily.    pravastatin (PRAVACHOL) 40 MG tablet Take 40 mg by mouth every evening.    senna-docusate 8.6-50 mg (PERICOLACE) 8.6-50 mg per tablet Take 2 tablets by mouth nightly as needed for Constipation.     Family History       Problem Relation (Age of Onset)    Hypertension Father    Stroke Mother          Tobacco Use    Smoking status: Former Smoker     Types: Cigarettes     Quit date: 2000     Years since quittin.2    Smokeless tobacco: Never Used   Substance and Sexual Activity    Alcohol use: Not Currently    Drug use: Never    Sexual activity: Not Currently     Review of Systems   Constitutional:  Negative for activity change, appetite change, chills, diaphoresis, fatigue, fever and unexpected weight change.   HENT:  Negative for congestion, postnasal drip, sore  throat and trouble swallowing.    Eyes:  Negative for discharge.   Respiratory:  Positive for cough and shortness of breath. Negative for chest tightness and wheezing.    Cardiovascular:  Negative for chest pain, palpitations and leg swelling.   Gastrointestinal:  Negative for abdominal pain, blood in stool, constipation, diarrhea, nausea and vomiting.   Genitourinary:  Negative for decreased urine volume, difficulty urinating, dysuria, hematuria and urgency.   Musculoskeletal:  Negative for arthralgias.   Skin:  Negative for rash and wound.   Neurological:  Negative for dizziness, speech difficulty, numbness and headaches.   Psychiatric/Behavioral:  Negative for agitation and sleep disturbance.    Objective:     Vital Signs (Most Recent):  Temp: 98.2 °F (36.8 °C) (04/09/22 0722)  Pulse: 87 (04/09/22 0722)  Resp: 20 (04/09/22 0722)  BP: (!) 123/91 (04/09/22 0722)  SpO2: 100 % (04/09/22 0722)   Vital Signs (24h Range):  Temp:  [97.7 °F (36.5 °C)-98.7 °F (37.1 °C)] 98.2 °F (36.8 °C)  Pulse:  [55-87] 87  Resp:  [17-23] 20  SpO2:  [89 %-100 %] 100 %  BP: ()/(53-91) 123/91     Weight: (!) 161.9 kg (357 lb)  Body mass index is 52.72 kg/m².    Physical Exam  Vitals and nursing note reviewed.   Constitutional:       Appearance: Normal appearance. She is obese.   HENT:      Head: Normocephalic and atraumatic.      Right Ear: Hearing normal.      Left Ear: Hearing normal.      Nose: Nose normal.      Mouth/Throat:      Lips: Pink.      Mouth: Mucous membranes are moist.   Eyes:      Pupils: Pupils are equal, round, and reactive to light.   Cardiovascular:      Rate and Rhythm: Normal rate and regular rhythm.      Pulses: Normal pulses.      Heart sounds: Normal heart sounds.   Pulmonary:      Effort: Pulmonary effort is normal.      Comments: Continuous oxygen, bipap prn; coarse bs baldo  Abdominal:      General: Abdomen is flat. Bowel sounds are normal. There is no distension.      Palpations: Abdomen is soft.       Tenderness: There is no abdominal tenderness.   Genitourinary:     Comments: Yang with yellow urine  Musculoskeletal:         General: Normal range of motion.      Cervical back: Full passive range of motion without pain and normal range of motion.   Skin:     General: Skin is warm and dry.      Capillary Refill: Capillary refill takes less than 2 seconds.   Neurological:      General: No focal deficit present.      Mental Status: Mental status is at baseline.      Cranial Nerves: Cranial nerves are intact.         CRANIAL NERVES     CN III, IV, VI   Pupils are equal, round, and reactive to light.     Significant Labs: All pertinent labs within the past 24 hours have been reviewed.  Recent Lab Results         04/09/22  0306   04/08/22  0908        Albumin 2.7   2.7       Alkaline Phosphatase 97   104       ALT 13   14       Anion Gap 1   1       AST 5   8       Baso # 0.00   0.00       Basophil % 0.0   0.0       BILIRUBIN TOTAL 0.2  Comment: For infants and newborns, interpretation of results should be based  on gestational age, weight and in agreement with clinical  observations.    Premature Infant recommended reference ranges:  Up to 24 hours.............<8.0 mg/dL  Up to 48 hours............<12.0 mg/dL  3-5 days..................<15.0 mg/dL  6-29 days.................<15.0 mg/dL    For patients on Eltrombopag therapy, use of Dimension [place] TBIL is   not   recommended.     0.2  Comment: For infants and newborns, interpretation of results should be based  on gestational age, weight and in agreement with clinical  observations.    Premature Infant recommended reference ranges:  Up to 24 hours.............<8.0 mg/dL  Up to 48 hours............<12.0 mg/dL  3-5 days..................<15.0 mg/dL  6-29 days.................<15.0 mg/dL    For patients on Eltrombopag therapy, use of Dimension [place] TBIL is   not   recommended.         BUN 19   13       Calcium 8.7   9.0       Chloride 99   102       CO2 37   36        Creatinine 0.8   0.9       Differential Method Automated   Automated       eGFR if  >60.0   >60.0       eGFR if non  >60.0  Comment: Calculation used to obtain the estimated glomerular filtration  rate (eGFR) is the CKD-EPI equation.      >60.0  Comment: Calculation used to obtain the estimated glomerular filtration  rate (eGFR) is the CKD-EPI equation.          Eos # 0.0   0.0       Eosinophil % 0.0   0.0       Glucose 148   189       Gran # (ANC) 5.4   3.4       Gran % 80.1   73.0       Hematocrit 42.9   45.8       Hemoglobin 12.5   13.6       Immature Grans (Abs) 0.02  Comment: Mild elevation in immature granulocytes is non specific and   can be seen in a variety of conditions including stress response,   acute inflammation, trauma and pregnancy. Correlation with other   laboratory and clinical findings is essential.     0.01  Comment: Mild elevation in immature granulocytes is non specific and   can be seen in a variety of conditions including stress response,   acute inflammation, trauma and pregnancy. Correlation with other   laboratory and clinical findings is essential.         Immature Granulocytes 0.3   0.2       Lymph # 0.9   1.0       Lymph % 12.6   22.0       Magnesium 2.3   2.2       MCH 27.9   29.1       MCHC 29.1   29.7       MCV 96   98       Mono # 0.5   0.2       Mono % 7.0   4.8       MPV 9.9   9.6       nRBC 0   0       Platelets 243   206       Potassium 4.4   4.2       PROTEIN TOTAL 6.2   6.5       RBC 4.48   4.68       RDW 14.9   14.8       Sodium 137   139       WBC 6.76   4.63               Significant Imaging:   X-Ray Chest AP Portable  Narrative: EXAMINATION:  XR CHEST AP PORTABLE    CLINICAL HISTORY:  Sepsis;    COMPARISON:  03/14/2022    FINDINGS:  Increased hazy attenuation left lower lung zone suspicious for increased pleural fluid adjacent atelectasis or infiltrate.  Prominent cardiomediastinal silhouette has not significantly changed.  Mild vascular  congestion.  Impression: Increased attenuation left lower hemithorax suspicious for increased pleural fluid adjacent atelectasis and or infiltrate    Prominent cardiomediastinal silhouette and vascular congestion, similar to prior    Electronically signed by: Fior Cummins MD  Date:    04/07/2022  Time:    09:12

## 2022-04-09 NOTE — ASSESSMENT & PLAN NOTE
Patient appears to be at baseline today answering questions and pleasant; continue Aricept and Namenda

## 2022-04-09 NOTE — ASSESSMENT & PLAN NOTE
This patient does have evidence of infective focus  My overall impression is sepsis. Vital signs were reviewed and noted in progress note.  Antibiotics given-   Antibiotics (From admission, onward)            Start     Stop Route Frequency Ordered    04/08/22 1100  mupirocin 2 % ointment         04/13 0859 Nasl 2 times daily 04/08/22 0947    04/07/22 0031  cefTRIAXone (ROCEPHIN) 1 g/50 mL D5W IVPB         -- IV Every 24 hours (non-standard times) 04/07/22 0031        Cultures were taken-   Microbiology Results (last 7 days)     Procedure Component Value Units Date/Time    Blood culture x two cultures. Draw prior to antibiotics. [[id number]] Collected: 04/06/22 2207    Order Status: Completed Specimen: Blood Updated: 04/09/22 0613     Blood Culture, Routine No Growth to date      No Growth to date      No Growth to date    Narrative:      Aerobic and anaerobic    Blood culture x two cultures. Draw prior to antibiotics. [[id number]] Collected: 04/06/22 2159    Order Status: Completed Specimen: Blood Updated: 04/09/22 0613     Blood Culture, Routine No Growth to date      No Growth to date      No Growth to date    Narrative:      Aerobic and anaerobic    Urine culture [[id number]] Collected: 04/06/22 2219    Order Status: Completed Specimen: Urine Updated: 04/08/22 0955     Urine Culture, Routine Multiple organisms isolated. None in predominance.  Repeat if      clinically necessary.    Narrative:      Preferred Collection Type->Urine, Clean Catch  Specimen Source->Urine        Latest lactate reviewed, they are-  Recent Labs   Lab 04/06/22 2207   LACTATE 1.0       Organ dysfunction indicated by Encephalopathy  and Acute respiratory failure   4/9 ms improved and reps status improving.  Continue IV antibiotics for patient's

## 2022-04-09 NOTE — PROGRESS NOTES
[facility] Medicine  Progress Note    Patient Name: Martina Betancourt  MRN: [id number]  Patient Class: IP- Inpatient   Admission Date: 4/6/2022  Length of Stay: 3 days  Attending Physician: Joe Fuller III, MD  Primary Care Provider: Joe Fuller III, MD        Subjective:     Principal Problem:Sepsis        HPI:  72 yo female with extensive pmhx including dementia, chronic respiratory failure on home O2 is here via EMS from home with complaint of SOB and CP x 1 day. No aggravating or alleviating factors. EMS reports sat on 2L was 66%. Given duoneb abd solumedrol en route. Similar past presentations. Dementia limits this history. Spoke with Sharon aranda daughter for history and reports that she is DNR      Overview/Hospital Course:  4/8/22 [facility] patient was awake and alert eating breakfast and reports feeling better. Complains of cough  4/9 ND Pt is feelign better- repotrs breathign has improved but still coughing a lot      Past Medical History:   Diagnosis Date    Alzheimer disease     Alzheimer disease     Coronary artery disease     Hyperlipidemia     Hypertension     Myopathy     Non-ST elevation (NSTEMI) myocardial infarction     Obesity     Pneumonia     Sleep apnea        Past Surgical History:   Procedure Laterality Date    APPENDECTOMY      APPENDECTOMY      CHOLECYSTECTOMY      CORONARY STENT PLACEMENT      HYSTERECTOMY      SURGICAL REMOVAL OF VERTEBRAL BODY OF THORACIC SPINE N/A 12/8/2020    Procedure: T9-T10 corpectomy, posterior instrumented fusion T7-T12.;  Surgeon: Everardo Gongora MD;  Location: [facility] OR [address] [place] [address];  Service: Neurosurgery;  Laterality: N/A;    TONSILLECTOMY         Review of patient's allergies indicates:   Allergen Reactions    Tizanidine        No current facility-administered medications on file prior to encounter.     Current Outpatient Medications on File Prior to Encounter   Medication Sig    acetaminophen (TYLENOL) 325 MG tablet Take 2  tablets (650 mg total) by mouth every 6 (six) hours as needed (HEADACHE, TEMPERATURE - FEVER > 100.4). (Patient not taking: Reported on 1/19/2021)    albuterol-ipratropium (DUO-NEB) 2.5 mg-0.5 mg/3 mL nebulizer solution Take 3 mLs by nebulization every 6 (six) hours as needed for Wheezing or Shortness of Breath. Rescue (Patient not taking: Reported on 1/19/2021)    aluminum & magnesium hydroxide-simethicone (MYLANTA MAX STRENGTH) 400-400-40 mg/5 mL suspension Take 30 mLs by mouth every 6 (six) hours as needed for Indigestion. (Patient not taking: Reported on 1/19/2021)    aspirin (ECOTRIN) 81 MG EC tablet Take 81 mg by mouth once daily.    bacitracin zinc 500 unit/gram OiPk Apply topically 2 (two) times daily.    clopidogreL (PLAVIX) 75 mg tablet Take 1 tablet (75 mg total) by mouth once daily.    donepeziL (ARICEPT) 10 MG tablet Take 10 mg by mouth every evening.    doxycycline (VIBRA-TABS) 100 MG tablet take one tablet by mouth twice daily (every 12 hours) for infection until all gone. take with food. thank you!!!    DULoxetine (CYMBALTA) 60 MG capsule Take 60 mg by mouth once daily.    ferrous sulfate 324 mg (65 mg iron) TbEC Take 325 mg by mouth every evening.    furosemide (LASIX) 40 MG tablet Take 1 tablet (40 mg total) by mouth once daily.    isosorbide mononitrate (IMDUR) 60 MG 24 hr tablet Take 60 mg by mouth once daily.    memantine (NAMENDA) 10 MG Tab Take 1 tablet (10 mg total) by mouth 2 (two) times daily.    methocarbamoL (ROBAXIN) 750 MG Tab Take 1 tablet (750 mg total) by mouth 3 (three) times daily as needed.    miconazole NITRATE 2 % (MICOTIN) 2 % top powder Apply topically 2 (two) times daily. for 15 days    ondansetron (ZOFRAN-ODT) 4 MG TbDL Take 1 tablet (4 mg total) by mouth every 8 (eight) hours as needed. (Patient not taking: Reported on 1/19/2021)    oxyCODONE (ROXICODONE) 10 mg Tab immediate release tablet Take 1 tablet (10 mg total) by mouth every 6 (six) hours as needed  for Pain.    pantoprazole (PROTONIX) 40 MG tablet Take 1 tablet (40 mg total) by mouth before breakfast.    potassium chloride SA (K-DUR,KLOR-CON) 20 MEQ tablet Take 1 tablet (20 mEq total) by mouth once daily.    pravastatin (PRAVACHOL) 40 MG tablet Take 40 mg by mouth every evening.    senna-docusate 8.6-50 mg (PERICOLACE) 8.6-50 mg per tablet Take 2 tablets by mouth nightly as needed for Constipation.     Family History       Problem Relation (Age of Onset)    Hypertension Father    Stroke Mother          Tobacco Use    Smoking status: Former Smoker     Types: Cigarettes     Quit date: 2000     Years since quittin.2    Smokeless tobacco: Never Used   Substance and Sexual Activity    Alcohol use: Not Currently    Drug use: Never    Sexual activity: Not Currently     Review of Systems   Constitutional:  Negative for activity change, appetite change, chills, diaphoresis, fatigue, fever and unexpected weight change.   HENT:  Negative for congestion, postnasal drip, sore throat and trouble swallowing.    Eyes:  Negative for discharge.   Respiratory:  Positive for cough and shortness of breath. Negative for chest tightness and wheezing.    Cardiovascular:  Negative for chest pain, palpitations and leg swelling.   Gastrointestinal:  Negative for abdominal pain, blood in stool, constipation, diarrhea, nausea and vomiting.   Genitourinary:  Negative for decreased urine volume, difficulty urinating, dysuria, hematuria and urgency.   Musculoskeletal:  Negative for arthralgias.   Skin:  Negative for rash and wound.   Neurological:  Negative for dizziness, speech difficulty, numbness and headaches.   Psychiatric/Behavioral:  Negative for agitation and sleep disturbance.    Objective:     Vital Signs (Most Recent):  Temp: 98.2 °F (36.8 °C) (22)  Pulse: 87 (22)  Resp: 20 (22)  BP: (!) 123/91 (22)  SpO2: 100 % (22)   Vital Signs (24h Range):  Temp:  [97.7  °F (36.5 °C)-98.7 °F (37.1 °C)] 98.2 °F (36.8 °C)  Pulse:  [55-87] 87  Resp:  [17-23] 20  SpO2:  [89 %-100 %] 100 %  BP: ()/(53-91) 123/91     Weight: (!) 161.9 kg (357 lb)  Body mass index is 52.72 kg/m².    Physical Exam  Vitals and nursing note reviewed.   Constitutional:       Appearance: Normal appearance. She is obese.   HENT:      Head: Normocephalic and atraumatic.      Right Ear: Hearing normal.      Left Ear: Hearing normal.      Nose: Nose normal.      Mouth/Throat:      Lips: Pink.      Mouth: Mucous membranes are moist.   Eyes:      Pupils: Pupils are equal, round, and reactive to light.   Cardiovascular:      Rate and Rhythm: Normal rate and regular rhythm.      Pulses: Normal pulses.      Heart sounds: Normal heart sounds.   Pulmonary:      Effort: Pulmonary effort is normal.      Comments: Continuous oxygen, bipap prn; coarse bs baldo  Abdominal:      General: Abdomen is flat. Bowel sounds are normal. There is no distension.      Palpations: Abdomen is soft.      Tenderness: There is no abdominal tenderness.   Genitourinary:     Comments: Yang with yellow urine  Musculoskeletal:         General: Normal range of motion.      Cervical back: Full passive range of motion without pain and normal range of motion.   Skin:     General: Skin is warm and dry.      Capillary Refill: Capillary refill takes less than 2 seconds.   Neurological:      General: No focal deficit present.      Mental Status: Mental status is at baseline.      Cranial Nerves: Cranial nerves are intact.         CRANIAL NERVES     CN III, IV, VI   Pupils are equal, round, and reactive to light.     Significant Labs: All pertinent labs within the past 24 hours have been reviewed.  Recent Lab Results         04/09/22  0306   04/08/22  0908        Albumin 2.7   2.7       Alkaline Phosphatase 97   104       ALT 13   14       Anion Gap 1   1       AST 5   8       Baso # 0.00   0.00       Basophil % 0.0   0.0       BILIRUBIN TOTAL  0.2  Comment: For infants and newborns, interpretation of results should be based  on gestational age, weight and in agreement with clinical  observations.    Premature Infant recommended reference ranges:  Up to 24 hours.............<8.0 mg/dL  Up to 48 hours............<12.0 mg/dL  3-5 days..................<15.0 mg/dL  6-29 days.................<15.0 mg/dL    For patients on Eltrombopag therapy, use of Dimension Keno TBIL is   not   recommended.     0.2  Comment: For infants and newborns, interpretation of results should be based  on gestational age, weight and in agreement with clinical  observations.    Premature Infant recommended reference ranges:  Up to 24 hours.............<8.0 mg/dL  Up to 48 hours............<12.0 mg/dL  3-5 days..................<15.0 mg/dL  6-29 days.................<15.0 mg/dL    For patients on Eltrombopag therapy, use of Dimension Keno TBIL is   not   recommended.         BUN 19   13       Calcium 8.7   9.0       Chloride 99   102       CO2 37   36       Creatinine 0.8   0.9       Differential Method Automated   Automated       eGFR if  >60.0   >60.0       eGFR if non  >60.0  Comment: Calculation used to obtain the estimated glomerular filtration  rate (eGFR) is the CKD-EPI equation.      >60.0  Comment: Calculation used to obtain the estimated glomerular filtration  rate (eGFR) is the CKD-EPI equation.          Eos # 0.0   0.0       Eosinophil % 0.0   0.0       Glucose 148   189       Gran # (ANC) 5.4   3.4       Gran % 80.1   73.0       Hematocrit 42.9   45.8       Hemoglobin 12.5   13.6       Immature Grans (Abs) 0.02  Comment: Mild elevation in immature granulocytes is non specific and   can be seen in a variety of conditions including stress response,   acute inflammation, trauma and pregnancy. Correlation with other   laboratory and clinical findings is essential.     0.01  Comment: Mild elevation in immature granulocytes is non specific and    can be seen in a variety of conditions including stress response,   acute inflammation, trauma and pregnancy. Correlation with other   laboratory and clinical findings is essential.         Immature Granulocytes 0.3   0.2       Lymph # 0.9   1.0       Lymph % 12.6   22.0       Magnesium 2.3   2.2       MCH 27.9   29.1       MCHC 29.1   29.7       MCV 96   98       Mono # 0.5   0.2       Mono % 7.0   4.8       MPV 9.9   9.6       nRBC 0   0       Platelets 243   206       Potassium 4.4   4.2       PROTEIN TOTAL 6.2   6.5       RBC 4.48   4.68       RDW 14.9   14.8       Sodium 137   139       WBC 6.76   4.63               Significant Imaging:   X-Ray Chest AP Portable  Narrative: EXAMINATION:  XR CHEST AP PORTABLE    CLINICAL HISTORY:  Sepsis;    COMPARISON:  03/14/2022    FINDINGS:  Increased hazy attenuation left lower lung zone suspicious for increased pleural fluid adjacent atelectasis or infiltrate.  Prominent cardiomediastinal silhouette has not significantly changed.  Mild vascular congestion.  Impression: Increased attenuation left lower hemithorax suspicious for increased pleural fluid adjacent atelectasis and or infiltrate    Prominent cardiomediastinal silhouette and vascular congestion, similar to prior    Electronically signed by: Fior Cummins MD  Date:    04/07/2022  Time:    09:12       Assessment/Plan:      * Sepsis  This patient does have evidence of infective focus  My overall impression is sepsis. Vital signs were reviewed and noted in progress note.  Antibiotics given-   Antibiotics (From admission, onward)            Start     Stop Route Frequency Ordered    04/08/22 1100  mupirocin 2 % ointment         04/13 0859 Nasl 2 times daily 04/08/22 0947    04/07/22 0031  cefTRIAXone (ROCEPHIN) 1 g/50 mL D5W IVPB         -- IV Every 24 hours (non-standard times) 04/07/22 0031        Cultures were taken-   Microbiology Results (last 7 days)     Procedure Component Value Units Date/Time    Blood  culture x two cultures. Draw prior to antibiotics. [[id number]] Collected: 04/06/22 2207    Order Status: Completed Specimen: Blood Updated: 04/09/22 0613     Blood Culture, Routine No Growth to date      No Growth to date      No Growth to date    Narrative:      Aerobic and anaerobic    Blood culture x two cultures. Draw prior to antibiotics. [[id number]] Collected: 04/06/22 2159    Order Status: Completed Specimen: Blood Updated: 04/09/22 0613     Blood Culture, Routine No Growth to date      No Growth to date      No Growth to date    Narrative:      Aerobic and anaerobic    Urine culture [[id number]] Collected: 04/06/22 2219    Order Status: Completed Specimen: Urine Updated: 04/08/22 0955     Urine Culture, Routine Multiple organisms isolated. None in predominance.  Repeat if      clinically necessary.    Narrative:      Preferred Collection Type->Urine, Clean Catch  Specimen Source->Urine        Latest lactate reviewed, they are-  Recent Labs   Lab 04/06/22 2207   LACTATE 1.0       Organ dysfunction indicated by Encephalopathy  and Acute respiratory failure   4/9 ms improved and reps status improving.  Continue IV antibiotics for patient's    AMS (altered mental status)  Resolved likely at baseline  4/9 appears at baseline      Bedbound        Acute on chronic respiratory failure with hypoxia and hypercapnia  Patient with Hypercapnic and Hypoxic Respiratory failure which is Acute on chronic.  she is on home oxygen at 3  LPM. Supplemental oxygen was provided and noted- Oxygen Concentration (%):  [36-50] 36.   Signs/symptoms of respiratory failure include- tachypnea, use of accessory muscles and lethargy. Contributing diagnoses includes - COPD, Interstitial lung disease and Obesity Hypoventilation Labs and images were reviewed. Patient Has recent ABG, which has been reviewed. Will treat underlying causes and adjust management of respiratory failure as follows-     Continue Oxygen via bipap  4/9 improving patient  is getting IV steroids IV antibiotics she is on her oxygen and less use of the BiPAP    Urinary tract infection without hematuria  Continue rocephin and await final urine cultures  4/9 continue IV antibiotics      Severe obesity (BMI >= 40)  Body mass index is 52.72 kg/m². Morbid obesity complicates all aspects of disease management from diagnostic modalities to treatment. Weight loss encouraged and health benefits explained to patient.         Dementia without behavioral disturbance  Patient appears to be at baseline today answering questions and pleasant; continue Aricept and Namenda        VTE Risk Mitigation (From admission, onward)         Ordered     IP VTE HIGH RISK PATIENT  Once         04/07/22 0031     Place sequential compression device  Until discontinued         04/07/22 0031                Discharge Planning   BRIT:      Code Status: DNR   Is the patient medically ready for discharge?:     Reason for patient still in hospital (select all that apply): Treatment  Discharge Plan A: Home with family, Home Health                  Angelica Fortune MD  Department of Hospital Medicine   [facility]

## 2022-04-10 LAB
ALBUMIN SERPL BCP-MCNC: 2.7 G/DL (ref 3.5–5.2)
ALP SERPL-CCNC: 97 U/L (ref 55–135)
ALT SERPL W/O P-5'-P-CCNC: 19 U/L (ref 10–44)
ANION GAP SERPL CALC-SCNC: -1 MMOL/L (ref 8–16)
AST SERPL-CCNC: 10 U/L (ref 10–40)
BASOPHILS # BLD AUTO: 0 K/UL (ref 0–0.2)
BASOPHILS NFR BLD: 0 % (ref 0–1.9)
BILIRUB SERPL-MCNC: 0.2 MG/DL (ref 0.1–1)
BUN SERPL-MCNC: 31 MG/DL (ref 8–23)
CALCIUM SERPL-MCNC: 8.7 MG/DL (ref 8.7–10.5)
CHLORIDE SERPL-SCNC: 102 MMOL/L (ref 95–110)
CO2 SERPL-SCNC: 40 MMOL/L (ref 23–29)
CREAT SERPL-MCNC: 0.8 MG/DL (ref 0.5–1.4)
DIFFERENTIAL METHOD: ABNORMAL
EOSINOPHIL # BLD AUTO: 0 K/UL (ref 0–0.5)
EOSINOPHIL NFR BLD: 0 % (ref 0–8)
ERYTHROCYTE [DISTWIDTH] IN BLOOD BY AUTOMATED COUNT: 15.1 % (ref 11.5–14.5)
EST. GFR  (AFRICAN AMERICAN): >60 ML/MIN/1.73 M^2
EST. GFR  (NON AFRICAN AMERICAN): >60 ML/MIN/1.73 M^2
GLUCOSE SERPL-MCNC: 136 MG/DL (ref 70–110)
HCT VFR BLD AUTO: 43.4 % (ref 37–48.5)
HGB BLD-MCNC: 12.7 G/DL (ref 12–16)
IMM GRANULOCYTES # BLD AUTO: 0.02 K/UL (ref 0–0.04)
IMM GRANULOCYTES NFR BLD AUTO: 0.3 % (ref 0–0.5)
LYMPHOCYTES # BLD AUTO: 0.7 K/UL (ref 1–4.8)
LYMPHOCYTES NFR BLD: 11.6 % (ref 18–48)
MAGNESIUM SERPL-MCNC: 2.6 MG/DL (ref 1.6–2.6)
MCH RBC QN AUTO: 28.5 PG (ref 27–31)
MCHC RBC AUTO-ENTMCNC: 29.3 G/DL (ref 32–36)
MCV RBC AUTO: 98 FL (ref 82–98)
MONOCYTES # BLD AUTO: 0.5 K/UL (ref 0.3–1)
MONOCYTES NFR BLD: 7.7 % (ref 4–15)
NEUTROPHILS # BLD AUTO: 5.1 K/UL (ref 1.8–7.7)
NEUTROPHILS NFR BLD: 80.4 % (ref 38–73)
NRBC BLD-RTO: 0 /100 WBC
PLATELET # BLD AUTO: 214 K/UL (ref 150–450)
PMV BLD AUTO: 9.7 FL (ref 9.2–12.9)
POTASSIUM SERPL-SCNC: 5 MMOL/L (ref 3.5–5.1)
PROT SERPL-MCNC: 6 G/DL (ref 6–8.4)
RBC # BLD AUTO: 4.45 M/UL (ref 4–5.4)
SODIUM SERPL-SCNC: 141 MMOL/L (ref 136–145)
WBC # BLD AUTO: 6.37 K/UL (ref 3.9–12.7)

## 2022-04-10 PROCEDURE — 94640 AIRWAY INHALATION TREATMENT: CPT

## 2022-04-10 PROCEDURE — 36415 COLL VENOUS BLD VENIPUNCTURE: CPT | Performed by: NURSE PRACTITIONER

## 2022-04-10 PROCEDURE — 27000221 HC OXYGEN, UP TO 24 HOURS

## 2022-04-10 PROCEDURE — 63600175 PHARM REV CODE 636 W HCPCS: Performed by: INTERNAL MEDICINE

## 2022-04-10 PROCEDURE — 99900035 HC TECH TIME PER 15 MIN (STAT)

## 2022-04-10 PROCEDURE — 25000003 PHARM REV CODE 250: Performed by: NURSE PRACTITIONER

## 2022-04-10 PROCEDURE — 25000003 PHARM REV CODE 250: Performed by: INTERNAL MEDICINE

## 2022-04-10 PROCEDURE — 94761 N-INVAS EAR/PLS OXIMETRY MLT: CPT

## 2022-04-10 PROCEDURE — 99900031 HC PATIENT EDUCATION (STAT)

## 2022-04-10 PROCEDURE — 80053 COMPREHEN METABOLIC PANEL: CPT | Performed by: NURSE PRACTITIONER

## 2022-04-10 PROCEDURE — 11000001 HC ACUTE MED/SURG PRIVATE ROOM

## 2022-04-10 PROCEDURE — 63600175 PHARM REV CODE 636 W HCPCS: Performed by: NURSE PRACTITIONER

## 2022-04-10 PROCEDURE — 25000242 PHARM REV CODE 250 ALT 637 W/ HCPCS: Performed by: NURSE PRACTITIONER

## 2022-04-10 PROCEDURE — 83735 ASSAY OF MAGNESIUM: CPT | Performed by: NURSE PRACTITIONER

## 2022-04-10 PROCEDURE — C9113 INJ PANTOPRAZOLE SODIUM, VIA: HCPCS | Performed by: INTERNAL MEDICINE

## 2022-04-10 PROCEDURE — 85025 COMPLETE CBC W/AUTO DIFF WBC: CPT | Performed by: NURSE PRACTITIONER

## 2022-04-10 RX ORDER — FUROSEMIDE 40 MG/1
40 TABLET ORAL 2 TIMES DAILY
Status: DISCONTINUED | OUTPATIENT
Start: 2022-04-10 | End: 2022-04-12 | Stop reason: HOSPADM

## 2022-04-10 RX ORDER — PANTOPRAZOLE SODIUM 40 MG/10ML
40 INJECTION, POWDER, LYOPHILIZED, FOR SOLUTION INTRAVENOUS DAILY
Status: DISCONTINUED | OUTPATIENT
Start: 2022-04-10 | End: 2022-04-11

## 2022-04-10 RX ORDER — METHYLPREDNISOLONE SOD SUCC 125 MG
80 VIAL (EA) INJECTION EVERY 12 HOURS
Status: DISCONTINUED | OUTPATIENT
Start: 2022-04-10 | End: 2022-04-12 | Stop reason: HOSPADM

## 2022-04-10 RX ADMIN — MEMANTINE 10 MG: 10 TABLET ORAL at 09:04

## 2022-04-10 RX ADMIN — DONEPEZIL HYDROCHLORIDE 10 MG: 5 TABLET ORAL at 08:04

## 2022-04-10 RX ADMIN — HYDROCODONE BITARTRATE AND ACETAMINOPHEN 1 TABLET: 5; 325 TABLET ORAL at 09:04

## 2022-04-10 RX ADMIN — GABAPENTIN 300 MG: 300 CAPSULE ORAL at 08:04

## 2022-04-10 RX ADMIN — MUPIROCIN: 20 OINTMENT TOPICAL at 09:04

## 2022-04-10 RX ADMIN — PANTOPRAZOLE SODIUM 40 MG: 40 INJECTION, POWDER, LYOPHILIZED, FOR SOLUTION INTRAVENOUS at 09:04

## 2022-04-10 RX ADMIN — GABAPENTIN 300 MG: 300 CAPSULE ORAL at 09:04

## 2022-04-10 RX ADMIN — FUROSEMIDE 40 MG: 40 TABLET ORAL at 09:04

## 2022-04-10 RX ADMIN — IPRATROPIUM BROMIDE AND ALBUTEROL SULFATE 3 ML: 2.5; .5 SOLUTION RESPIRATORY (INHALATION) at 07:04

## 2022-04-10 RX ADMIN — PRAVASTATIN SODIUM 40 MG: 40 TABLET ORAL at 08:04

## 2022-04-10 RX ADMIN — IPRATROPIUM BROMIDE AND ALBUTEROL SULFATE 3 ML: 2.5; .5 SOLUTION RESPIRATORY (INHALATION) at 02:04

## 2022-04-10 RX ADMIN — MEMANTINE 10 MG: 10 TABLET ORAL at 08:04

## 2022-04-10 RX ADMIN — METHYLPREDNISOLONE SODIUM SUCCINATE 80 MG: 125 INJECTION, POWDER, FOR SOLUTION INTRAMUSCULAR; INTRAVENOUS at 05:04

## 2022-04-10 RX ADMIN — METHYLPREDNISOLONE SODIUM SUCCINATE 80 MG: 125 INJECTION, POWDER, FOR SOLUTION INTRAMUSCULAR; INTRAVENOUS at 08:04

## 2022-04-10 RX ADMIN — MUPIROCIN: 20 OINTMENT TOPICAL at 08:04

## 2022-04-10 RX ADMIN — HYDROCODONE BITARTRATE AND ACETAMINOPHEN 1 TABLET: 5; 325 TABLET ORAL at 06:04

## 2022-04-10 RX ADMIN — CEFTRIAXONE 1 G: 1 INJECTION, SOLUTION INTRAVENOUS at 12:04

## 2022-04-10 RX ADMIN — DULOXETINE HYDROCHLORIDE 60 MG: 60 CAPSULE, DELAYED RELEASE ORAL at 09:04

## 2022-04-10 RX ADMIN — FERROUS SULFATE TAB 325 MG (65 MG ELEMENTAL FE) 1 EACH: 325 (65 FE) TAB at 09:04

## 2022-04-10 RX ADMIN — FUROSEMIDE 40 MG: 40 TABLET ORAL at 06:04

## 2022-04-10 RX ADMIN — ALUMINUM HYDROXIDE, MAGNESIUM HYDROXIDE, AND SIMETHICONE 30 ML: 200; 200; 20 SUSPENSION ORAL at 05:04

## 2022-04-10 RX ADMIN — ISOSORBIDE MONONITRATE 60 MG: 30 TABLET, EXTENDED RELEASE ORAL at 09:04

## 2022-04-10 NOTE — CARE UPDATE
Patient: Nicholas Negrete    Procedure Summary     Date Anesthesia Start Anesthesia Stop Room / Location    10/27/17 1539 1553 [facility] ENDOSCOPY 1 / [facility] ENDOSCOPY       Procedure Diagnosis Surgeon Provider    COLONOSCOPY (N/A ) Encounter for screening colonoscopy  (Encounter for screening colonoscopy [Z12.11]) MD Meryl Meade CRNA          Anesthesia Type: MAC  Last vitals  BP   (!) 167/103 (10/27/17 1352)   Temp   97.9 °F (36.6 °C) (10/27/17 1352)   Pulse   96 (10/27/17 1352)   Resp   18 (10/27/17 1352)     SpO2   96 % (10/27/17 1352)     Post Anesthesia Care and Evaluation    Patient location during evaluation: bedside  Patient participation: waiting for patient participation  Level of consciousness: responsive to verbal stimuli  Pain management: adequate  Airway patency: patent  Anesthetic complications: No anesthetic complications    Cardiovascular status: acceptable  Respiratory status: acceptable  Hydration status: acceptable       Received phone call by bedside RN that pt was having emesis episodes and spO2 desats to low 80's while on 6LNC.  RT switched O2 device to 12L FiO2 50% Venti mask with spO2 jumping to 94%.  Pt will remain on venti mask QHS instead of scheduled BiPAP therapy because of emesis.  Bedside RN is aware of O2 device change and tonight's plan.  Will continue to monitor.

## 2022-04-10 NOTE — SUBJECTIVE & OBJECTIVE
Past Medical History:   Diagnosis Date    Alzheimer disease     Alzheimer disease     Coronary artery disease     Hyperlipidemia     Hypertension     Myopathy     Non-ST elevation (NSTEMI) myocardial infarction     Obesity     Pneumonia     Sleep apnea        Past Surgical History:   Procedure Laterality Date    APPENDECTOMY      APPENDECTOMY      CHOLECYSTECTOMY      CORONARY STENT PLACEMENT      HYSTERECTOMY      SURGICAL REMOVAL OF VERTEBRAL BODY OF THORACIC SPINE N/A 12/8/2020    Procedure: T9-T10 corpectomy, posterior instrumented fusion T7-T12.;  Surgeon: Everardo Gongora MD;  Location: [facility] OR [address] [place] [address];  Service: Neurosurgery;  Laterality: N/A;    TONSILLECTOMY         Review of patient's allergies indicates:   Allergen Reactions    Tizanidine        No current facility-administered medications on file prior to encounter.     Current Outpatient Medications on File Prior to Encounter   Medication Sig    acetaminophen (TYLENOL) 325 MG tablet Take 2 tablets (650 mg total) by mouth every 6 (six) hours as needed (HEADACHE, TEMPERATURE - FEVER > 100.4). (Patient not taking: Reported on 1/19/2021)    albuterol-ipratropium (DUO-NEB) 2.5 mg-0.5 mg/3 mL nebulizer solution Take 3 mLs by nebulization every 6 (six) hours as needed for Wheezing or Shortness of Breath. Rescue (Patient not taking: Reported on 1/19/2021)    aluminum & magnesium hydroxide-simethicone (MYLANTA MAX STRENGTH) 400-400-40 mg/5 mL suspension Take 30 mLs by mouth every 6 (six) hours as needed for Indigestion. (Patient not taking: Reported on 1/19/2021)    aspirin (ECOTRIN) 81 MG EC tablet Take 81 mg by mouth once daily.    bacitracin zinc 500 unit/gram OiPk Apply topically 2 (two) times daily.    clopidogreL (PLAVIX) 75 mg tablet Take 1 tablet (75 mg total) by mouth once daily.    donepeziL (ARICEPT) 10 MG tablet Take 10 mg by mouth every evening.    doxycycline (VIBRA-TABS) 100 MG tablet take one tablet by mouth twice daily (every 12 hours) for  infection until all gone. take with food. thank you!!!    DULoxetine (CYMBALTA) 60 MG capsule Take 60 mg by mouth once daily.    ferrous sulfate 324 mg (65 mg iron) TbEC Take 325 mg by mouth every evening.    furosemide (LASIX) 40 MG tablet Take 1 tablet (40 mg total) by mouth once daily.    isosorbide mononitrate (IMDUR) 60 MG 24 hr tablet Take 60 mg by mouth once daily.    memantine (NAMENDA) 10 MG Tab Take 1 tablet (10 mg total) by mouth 2 (two) times daily.    methocarbamoL (ROBAXIN) 750 MG Tab Take 1 tablet (750 mg total) by mouth 3 (three) times daily as needed.    miconazole NITRATE 2 % (MICOTIN) 2 % top powder Apply topically 2 (two) times daily. for 15 days    ondansetron (ZOFRAN-ODT) 4 MG TbDL Take 1 tablet (4 mg total) by mouth every 8 (eight) hours as needed. (Patient not taking: Reported on 2021)    oxyCODONE (ROXICODONE) 10 mg Tab immediate release tablet Take 1 tablet (10 mg total) by mouth every 6 (six) hours as needed for Pain.    pantoprazole (PROTONIX) 40 MG tablet Take 1 tablet (40 mg total) by mouth before breakfast.    potassium chloride SA (K-DUR,KLOR-CON) 20 MEQ tablet Take 1 tablet (20 mEq total) by mouth once daily.    pravastatin (PRAVACHOL) 40 MG tablet Take 40 mg by mouth every evening.    senna-docusate 8.6-50 mg (PERICOLACE) 8.6-50 mg per tablet Take 2 tablets by mouth nightly as needed for Constipation.     Family History       Problem Relation (Age of Onset)    Hypertension Father    Stroke Mother          Tobacco Use    Smoking status: Former Smoker     Types: Cigarettes     Quit date: 2000     Years since quittin.2    Smokeless tobacco: Never Used   Substance and Sexual Activity    Alcohol use: Not Currently    Drug use: Never    Sexual activity: Not Currently     Review of Systems   Constitutional:  Negative for activity change, appetite change, chills, diaphoresis, fatigue, fever and unexpected weight change.   HENT:  Negative for congestion, postnasal drip, sore  throat and trouble swallowing.    Eyes:  Negative for discharge.   Respiratory:  Positive for cough and shortness of breath. Negative for chest tightness and wheezing.    Cardiovascular:  Negative for chest pain, palpitations and leg swelling.   Gastrointestinal:  Negative for abdominal pain, blood in stool, constipation, diarrhea, nausea and vomiting.   Genitourinary:  Negative for decreased urine volume, difficulty urinating, dysuria, hematuria and urgency.   Musculoskeletal:  Negative for arthralgias.   Skin:  Negative for rash and wound.   Neurological:  Negative for dizziness, speech difficulty, numbness and headaches.   Psychiatric/Behavioral:  Negative for agitation and sleep disturbance.    Objective:     Vital Signs (Most Recent):  Temp: 97.5 °F (36.4 °C) (04/10/22 0734)  Pulse: 72 (04/10/22 0755)  Resp: 20 (04/10/22 0739)  BP: (!) 147/67 (04/10/22 0734)  SpO2: 97 % (04/10/22 0755)   Vital Signs (24h Range):  Temp:  [97.5 °F (36.4 °C)-98.9 °F (37.2 °C)] 97.5 °F (36.4 °C)  Pulse:  [66-82] 72  Resp:  [18-22] 20  SpO2:  [90 %-97 %] 97 %  BP: (120-147)/(56-68) 147/67     Weight: (!) 161.9 kg (357 lb)  Body mass index is 52.72 kg/m².    Physical Exam  Vitals and nursing note reviewed.   Constitutional:       Appearance: Normal appearance. She is obese.   HENT:      Head: Normocephalic and atraumatic.      Right Ear: Hearing normal.      Left Ear: Hearing normal.      Nose: Nose normal.      Mouth/Throat:      Lips: Pink.      Mouth: Mucous membranes are moist.   Eyes:      Pupils: Pupils are equal, round, and reactive to light.   Cardiovascular:      Rate and Rhythm: Normal rate and regular rhythm.      Pulses: Normal pulses.      Heart sounds: Normal heart sounds.   Pulmonary:      Effort: Pulmonary effort is normal.      Comments: Continuous oxygen, bipap prn; coarse bs baldo  Abdominal:      General: Abdomen is flat. Bowel sounds are normal. There is no distension.      Palpations: Abdomen is soft.       Tenderness: There is no abdominal tenderness.   Genitourinary:     Comments: Yang with yellow urine  Musculoskeletal:         General: Normal range of motion.      Cervical back: Full passive range of motion without pain and normal range of motion.   Skin:     General: Skin is warm and dry.      Capillary Refill: Capillary refill takes less than 2 seconds.   Neurological:      General: No focal deficit present.      Mental Status: Mental status is at baseline.      Cranial Nerves: Cranial nerves are intact.         CRANIAL NERVES     CN III, IV, VI   Pupils are equal, round, and reactive to light.     Significant Labs: All pertinent labs within the past 24 hours have been reviewed.  Recent Lab Results         04/10/22  0312        Albumin 2.7       Alkaline Phosphatase 97       ALT 19       Anion Gap -1       AST 10       Baso # 0.00       Basophil % 0.0       BILIRUBIN TOTAL 0.2  Comment: For infants and newborns, interpretation of results should be based  on gestational age, weight and in agreement with clinical  observations.    Premature Infant recommended reference ranges:  Up to 24 hours.............<8.0 mg/dL  Up to 48 hours............<12.0 mg/dL  3-5 days..................<15.0 mg/dL  6-29 days.................<15.0 mg/dL    For patients on Eltrombopag therapy, use of Dimension [place] TBIL is   not   recommended.         BUN 31       Calcium 8.7       Chloride 102       CO2 40       Creatinine 0.8       Differential Method Automated       eGFR if  >60.0       eGFR if non  >60.0  Comment: Calculation used to obtain the estimated glomerular filtration  rate (eGFR) is the CKD-EPI equation.          Eos # 0.0       Eosinophil % 0.0       Glucose 136       Gran # (ANC) 5.1       Gran % 80.4       Hematocrit 43.4       Hemoglobin 12.7       Immature Grans (Abs) 0.02  Comment: Mild elevation in immature granulocytes is non specific and   can be seen in a variety of conditions  including stress response,   acute inflammation, trauma and pregnancy. Correlation with other   laboratory and clinical findings is essential.         Immature Granulocytes 0.3       Lymph # 0.7       Lymph % 11.6       Magnesium 2.6       MCH 28.5       MCHC 29.3       MCV 98       Mono # 0.5       Mono % 7.7       MPV 9.7       nRBC 0       Platelets 214       Potassium 5.0       PROTEIN TOTAL 6.0       RBC 4.45       RDW 15.1       Sodium 141       WBC 6.37               Significant Imaging:   X-Ray Chest AP Portable  Narrative: EXAMINATION:  XR CHEST AP PORTABLE    CLINICAL HISTORY:  Sepsis;    COMPARISON:  03/14/2022    FINDINGS:  Increased hazy attenuation left lower lung zone suspicious for increased pleural fluid adjacent atelectasis or infiltrate.  Prominent cardiomediastinal silhouette has not significantly changed.  Mild vascular congestion.  Impression: Increased attenuation left lower hemithorax suspicious for increased pleural fluid adjacent atelectasis and or infiltrate    Prominent cardiomediastinal silhouette and vascular congestion, similar to prior    Electronically signed by: Fior Cummins MD  Date:    04/07/2022  Time:    09:12

## 2022-04-10 NOTE — PROGRESS NOTES
[facility] Medicine  Progress Note    Patient Name: Martina Betancourt  MRN: [id number]  Patient Class: IP- Inpatient   Admission Date: 4/6/2022  Length of Stay: 4 days  Attending Physician: Joe Fuller III, MD  Primary Care Provider: Joe Fuller III, MD        Subjective:     Principal Problem:Sepsis        HPI:  72 yo female with extensive pmhx including dementia, chronic respiratory failure on home O2 is here via EMS from home with complaint of SOB and CP x 1 day. No aggravating or alleviating factors. EMS reports sat on 2L was 66%. Given duoneb abd solumedrol en route. Similar past presentations. Dementia limits this history. Spoke with Sharon aranda daughter for history and reports that she is DNR      Overview/Hospital Course:  4/8/22 [facility] patient was awake and alert eating breakfast and reports feeling better. Complains of cough  4/9 ND Pt is feelign better- repotrs breathign has improved but still coughing a lot  4/10 pt repotrs feelign ok - resp repotrs havign to increase her O2 to get her in the low 90%.  Cxr reviewed      Past Medical History:   Diagnosis Date    Alzheimer disease     Alzheimer disease     Coronary artery disease     Hyperlipidemia     Hypertension     Myopathy     Non-ST elevation (NSTEMI) myocardial infarction     Obesity     Pneumonia     Sleep apnea        Past Surgical History:   Procedure Laterality Date    APPENDECTOMY      APPENDECTOMY      CHOLECYSTECTOMY      CORONARY STENT PLACEMENT      HYSTERECTOMY      SURGICAL REMOVAL OF VERTEBRAL BODY OF THORACIC SPINE N/A 12/8/2020    Procedure: T9-T10 corpectomy, posterior instrumented fusion T7-T12.;  Surgeon: Everardo Gongora MD;  Location: [facility] OR [address] [place] [address];  Service: Neurosurgery;  Laterality: N/A;    TONSILLECTOMY         Review of patient's allergies indicates:   Allergen Reactions    Tizanidine        No current facility-administered medications on file prior to encounter.     Current Outpatient  Medications on File Prior to Encounter   Medication Sig    acetaminophen (TYLENOL) 325 MG tablet Take 2 tablets (650 mg total) by mouth every 6 (six) hours as needed (HEADACHE, TEMPERATURE - FEVER > 100.4). (Patient not taking: Reported on 1/19/2021)    albuterol-ipratropium (DUO-NEB) 2.5 mg-0.5 mg/3 mL nebulizer solution Take 3 mLs by nebulization every 6 (six) hours as needed for Wheezing or Shortness of Breath. Rescue (Patient not taking: Reported on 1/19/2021)    aluminum & magnesium hydroxide-simethicone (MYLANTA MAX STRENGTH) 400-400-40 mg/5 mL suspension Take 30 mLs by mouth every 6 (six) hours as needed for Indigestion. (Patient not taking: Reported on 1/19/2021)    aspirin (ECOTRIN) 81 MG EC tablet Take 81 mg by mouth once daily.    bacitracin zinc 500 unit/gram OiPk Apply topically 2 (two) times daily.    clopidogreL (PLAVIX) 75 mg tablet Take 1 tablet (75 mg total) by mouth once daily.    donepeziL (ARICEPT) 10 MG tablet Take 10 mg by mouth every evening.    doxycycline (VIBRA-TABS) 100 MG tablet take one tablet by mouth twice daily (every 12 hours) for infection until all gone. take with food. thank you!!!    DULoxetine (CYMBALTA) 60 MG capsule Take 60 mg by mouth once daily.    ferrous sulfate 324 mg (65 mg iron) TbEC Take 325 mg by mouth every evening.    furosemide (LASIX) 40 MG tablet Take 1 tablet (40 mg total) by mouth once daily.    isosorbide mononitrate (IMDUR) 60 MG 24 hr tablet Take 60 mg by mouth once daily.    memantine (NAMENDA) 10 MG Tab Take 1 tablet (10 mg total) by mouth 2 (two) times daily.    methocarbamoL (ROBAXIN) 750 MG Tab Take 1 tablet (750 mg total) by mouth 3 (three) times daily as needed.    miconazole NITRATE 2 % (MICOTIN) 2 % top powder Apply topically 2 (two) times daily. for 15 days    ondansetron (ZOFRAN-ODT) 4 MG TbDL Take 1 tablet (4 mg total) by mouth every 8 (eight) hours as needed. (Patient not taking: Reported on 1/19/2021)    oxyCODONE (ROXICODONE)  10 mg Tab immediate release tablet Take 1 tablet (10 mg total) by mouth every 6 (six) hours as needed for Pain.    pantoprazole (PROTONIX) 40 MG tablet Take 1 tablet (40 mg total) by mouth before breakfast.    potassium chloride SA (K-DUR,KLOR-CON) 20 MEQ tablet Take 1 tablet (20 mEq total) by mouth once daily.    pravastatin (PRAVACHOL) 40 MG tablet Take 40 mg by mouth every evening.    senna-docusate 8.6-50 mg (PERICOLACE) 8.6-50 mg per tablet Take 2 tablets by mouth nightly as needed for Constipation.     Family History       Problem Relation (Age of Onset)    Hypertension Father    Stroke Mother          Tobacco Use    Smoking status: Former Smoker     Types: Cigarettes     Quit date: 2000     Years since quittin.2    Smokeless tobacco: Never Used   Substance and Sexual Activity    Alcohol use: Not Currently    Drug use: Never    Sexual activity: Not Currently     Review of Systems   Constitutional:  Negative for activity change, appetite change, chills, diaphoresis, fatigue, fever and unexpected weight change.   HENT:  Negative for congestion, postnasal drip, sore throat and trouble swallowing.    Eyes:  Negative for discharge.   Respiratory:  Positive for cough and shortness of breath. Negative for chest tightness and wheezing.    Cardiovascular:  Negative for chest pain, palpitations and leg swelling.   Gastrointestinal:  Negative for abdominal pain, blood in stool, constipation, diarrhea, nausea and vomiting.   Genitourinary:  Negative for decreased urine volume, difficulty urinating, dysuria, hematuria and urgency.   Musculoskeletal:  Negative for arthralgias.   Skin:  Negative for rash and wound.   Neurological:  Negative for dizziness, speech difficulty, numbness and headaches.   Psychiatric/Behavioral:  Negative for agitation and sleep disturbance.    Objective:     Vital Signs (Most Recent):  Temp: 97.5 °F (36.4 °C) (04/10/22 0734)  Pulse: 72 (04/10/22 0755)  Resp: 20 (04/10/22  0739)  BP: (!) 147/67 (04/10/22 0734)  SpO2: 97 % (04/10/22 0755)   Vital Signs (24h Range):  Temp:  [97.5 °F (36.4 °C)-98.9 °F (37.2 °C)] 97.5 °F (36.4 °C)  Pulse:  [66-82] 72  Resp:  [18-22] 20  SpO2:  [90 %-97 %] 97 %  BP: (120-147)/(56-68) 147/67     Weight: (!) 161.9 kg (357 lb)  Body mass index is 52.72 kg/m².    Physical Exam  Vitals and nursing note reviewed.   Constitutional:       Appearance: Normal appearance. She is obese.   HENT:      Head: Normocephalic and atraumatic.      Right Ear: Hearing normal.      Left Ear: Hearing normal.      Nose: Nose normal.      Mouth/Throat:      Lips: Pink.      Mouth: Mucous membranes are moist.   Eyes:      Pupils: Pupils are equal, round, and reactive to light.   Cardiovascular:      Rate and Rhythm: Normal rate and regular rhythm.      Pulses: Normal pulses.      Heart sounds: Normal heart sounds.   Pulmonary:      Effort: Pulmonary effort is normal.      Comments: Continuous oxygen, bipap prn; coarse bs baldo  Abdominal:      General: Abdomen is flat. Bowel sounds are normal. There is no distension.      Palpations: Abdomen is soft.      Tenderness: There is no abdominal tenderness.   Genitourinary:     Comments: Yang with yellow urine  Musculoskeletal:         General: Normal range of motion.      Cervical back: Full passive range of motion without pain and normal range of motion.   Skin:     General: Skin is warm and dry.      Capillary Refill: Capillary refill takes less than 2 seconds.   Neurological:      General: No focal deficit present.      Mental Status: Mental status is at baseline.      Cranial Nerves: Cranial nerves are intact.         CRANIAL NERVES     CN III, IV, VI   Pupils are equal, round, and reactive to light.     Significant Labs: All pertinent labs within the past 24 hours have been reviewed.  Recent Lab Results         04/10/22  0312        Albumin 2.7       Alkaline Phosphatase 97       ALT 19       Anion Gap -1       AST 10       Baso #  0.00       Basophil % 0.0       BILIRUBIN TOTAL 0.2  Comment: For infants and newborns, interpretation of results should be based  on gestational age, weight and in agreement with clinical  observations.    Premature Infant recommended reference ranges:  Up to 24 hours.............<8.0 mg/dL  Up to 48 hours............<12.0 mg/dL  3-5 days..................<15.0 mg/dL  6-29 days.................<15.0 mg/dL    For patients on Eltrombopag therapy, use of Dimension [place] TBIL is   not   recommended.         BUN 31       Calcium 8.7       Chloride 102       CO2 40       Creatinine 0.8       Differential Method Automated       eGFR if  >60.0       eGFR if non  >60.0  Comment: Calculation used to obtain the estimated glomerular filtration  rate (eGFR) is the CKD-EPI equation.          Eos # 0.0       Eosinophil % 0.0       Glucose 136       Gran # (ANC) 5.1       Gran % 80.4       Hematocrit 43.4       Hemoglobin 12.7       Immature Grans (Abs) 0.02  Comment: Mild elevation in immature granulocytes is non specific and   can be seen in a variety of conditions including stress response,   acute inflammation, trauma and pregnancy. Correlation with other   laboratory and clinical findings is essential.         Immature Granulocytes 0.3       Lymph # 0.7       Lymph % 11.6       Magnesium 2.6       MCH 28.5       MCHC 29.3       MCV 98       Mono # 0.5       Mono % 7.7       MPV 9.7       nRBC 0       Platelets 214       Potassium 5.0       PROTEIN TOTAL 6.0       RBC 4.45       RDW 15.1       Sodium 141       WBC 6.37               Significant Imaging:   X-Ray Chest AP Portable  Narrative: EXAMINATION:  XR CHEST AP PORTABLE    CLINICAL HISTORY:  Sepsis;    COMPARISON:  03/14/2022    FINDINGS:  Increased hazy attenuation left lower lung zone suspicious for increased pleural fluid adjacent atelectasis or infiltrate.  Prominent cardiomediastinal silhouette has not significantly changed.  Mild  vascular congestion.  Impression: Increased attenuation left lower hemithorax suspicious for increased pleural fluid adjacent atelectasis and or infiltrate    Prominent cardiomediastinal silhouette and vascular congestion, similar to prior    Electronically signed by: Fior Cummins MD  Date:    04/07/2022  Time:    09:12       Assessment/Plan:      * Sepsis  This patient does have evidence of infective focus  My overall impression is sepsis. Vital signs were reviewed and noted in progress note.  Antibiotics given-   Antibiotics (From admission, onward)            Start     Stop Route Frequency Ordered    04/08/22 1100  mupirocin 2 % ointment         04/13 0859 Nasl 2 times daily 04/08/22 0947    04/07/22 0031  cefTRIAXone (ROCEPHIN) 1 g/50 mL D5W IVPB         -- IV Every 24 hours (non-standard times) 04/07/22 0031        Cultures were taken-   Microbiology Results (last 7 days)     Procedure Component Value Units Date/Time    Blood culture x two cultures. Draw prior to antibiotics. [[id number]] Collected: 04/06/22 2159    Order Status: Completed Specimen: Blood Updated: 04/10/22 0612     Blood Culture, Routine No Growth to date      No Growth to date      No Growth to date      No Growth to date    Narrative:      Aerobic and anaerobic    Blood culture x two cultures. Draw prior to antibiotics. [[id number]] Collected: 04/06/22 2207    Order Status: Completed Specimen: Blood Updated: 04/10/22 0612     Blood Culture, Routine No Growth to date      No Growth to date      No Growth to date      No Growth to date    Narrative:      Aerobic and anaerobic    Urine culture [[id number]] Collected: 04/06/22 2219    Order Status: Completed Specimen: Urine Updated: 04/08/22 0955     Urine Culture, Routine Multiple organisms isolated. None in predominance.  Repeat if      clinically necessary.    Narrative:      Preferred Collection Type->Urine, Clean Catch  Specimen Source->Urine        Latest lactate reviewed, they are-  No  results for input(s): LACTATE in the last 72 hours.    Organ dysfunction indicated by Encephalopathy  and Acute respiratory failure   4/9 ms improved and reps status improving.  Continue IV antibiotics for patient's  4/10 cotn iv abxs    AMS (altered mental status)  Resolved likely at baseline  4/9 appears at baseline      Bedbound        Acute on chronic respiratory failure with hypoxia and hypercapnia  Patient with Hypercapnic and Hypoxic Respiratory failure which is Acute on chronic.  she is on home oxygen at 3  LPM. Supplemental oxygen was provided and noted- Oxygen Concentration (%):  [36-50] 44.   Signs/symptoms of respiratory failure include- tachypnea, use of accessory muscles and lethargy. Contributing diagnoses includes - COPD, Interstitial lung disease and Obesity Hypoventilation Labs and images were reviewed. Patient Has recent ABG, which has been reviewed. Will treat underlying causes and adjust management of respiratory failure as follows-     Continue Oxygen via bipap  4/9 improving patient is getting IV steroids IV antibiotics she is on her oxygen and less use of the BiPAP  4/10 increase lasix to bid - cotn iv abxs- wean iv steroids; cont o2 and bipap    Urinary tract infection without hematuria  Continue rocephin and await final urine cultures  4/9 continue IV antibiotics  4/10 cont abxs      Severe obesity (BMI >= 40)  Body mass index is 52.72 kg/m². Morbid obesity complicates all aspects of disease management from diagnostic modalities to treatment. Weight loss encouraged and health benefits explained to patient.         Dementia without behavioral disturbance  Patient appears to be at baseline today answering questions and pleasant; continue Aricept and Namenda        VTE Risk Mitigation (From admission, onward)         Ordered     IP VTE HIGH RISK PATIENT  Once         04/07/22 0031     Place sequential compression device  Until discontinued         04/07/22 0031                Discharge  Planning   BRIT:      Code Status: DNR   Is the patient medically ready for discharge?:     Reason for patient still in hospital (select all that apply): Treatment  Discharge Plan A: Home with family, Home Health                  Angelica Fortune MD  Department of Hospital Medicine   [facility]

## 2022-04-10 NOTE — ASSESSMENT & PLAN NOTE
This patient does have evidence of infective focus  My overall impression is sepsis. Vital signs were reviewed and noted in progress note.  Antibiotics given-   Antibiotics (From admission, onward)            Start     Stop Route Frequency Ordered    04/08/22 1100  mupirocin 2 % ointment         04/13 0859 Nasl 2 times daily 04/08/22 0947    04/07/22 0031  cefTRIAXone (ROCEPHIN) 1 g/50 mL D5W IVPB         -- IV Every 24 hours (non-standard times) 04/07/22 0031        Cultures were taken-   Microbiology Results (last 7 days)     Procedure Component Value Units Date/Time    Blood culture x two cultures. Draw prior to antibiotics. [[id number]] Collected: 04/06/22 2159    Order Status: Completed Specimen: Blood Updated: 04/10/22 0612     Blood Culture, Routine No Growth to date      No Growth to date      No Growth to date      No Growth to date    Narrative:      Aerobic and anaerobic    Blood culture x two cultures. Draw prior to antibiotics. [[id number]] Collected: 04/06/22 2207    Order Status: Completed Specimen: Blood Updated: 04/10/22 0612     Blood Culture, Routine No Growth to date      No Growth to date      No Growth to date      No Growth to date    Narrative:      Aerobic and anaerobic    Urine culture [[id number]] Collected: 04/06/22 2219    Order Status: Completed Specimen: Urine Updated: 04/08/22 0955     Urine Culture, Routine Multiple organisms isolated. None in predominance.  Repeat if      clinically necessary.    Narrative:      Preferred Collection Type->Urine, Clean Catch  Specimen Source->Urine        Latest lactate reviewed, they are-  No results for input(s): LACTATE in the last 72 hours.    Organ dysfunction indicated by Encephalopathy  and Acute respiratory failure   4/9 ms improved and reps status improving.  Continue IV antibiotics for patient's  4/10 cotn iv abxs

## 2022-04-11 LAB
ALBUMIN SERPL BCP-MCNC: 2.7 G/DL (ref 3.5–5.2)
ALP SERPL-CCNC: 95 U/L (ref 55–135)
ALT SERPL W/O P-5'-P-CCNC: 26 U/L (ref 10–44)
ANION GAP SERPL CALC-SCNC: 0 MMOL/L (ref 8–16)
AST SERPL-CCNC: 11 U/L (ref 10–40)
BASOPHILS # BLD AUTO: 0 K/UL (ref 0–0.2)
BASOPHILS NFR BLD: 0 % (ref 0–1.9)
BILIRUB SERPL-MCNC: 0.2 MG/DL (ref 0.1–1)
BUN SERPL-MCNC: 27 MG/DL (ref 8–23)
CALCIUM SERPL-MCNC: 8.7 MG/DL (ref 8.7–10.5)
CHLORIDE SERPL-SCNC: 100 MMOL/L (ref 95–110)
CO2 SERPL-SCNC: 40 MMOL/L (ref 23–29)
CREAT SERPL-MCNC: 0.8 MG/DL (ref 0.5–1.4)
DIFFERENTIAL METHOD: ABNORMAL
EOSINOPHIL # BLD AUTO: 0 K/UL (ref 0–0.5)
EOSINOPHIL NFR BLD: 0 % (ref 0–8)
ERYTHROCYTE [DISTWIDTH] IN BLOOD BY AUTOMATED COUNT: 15 % (ref 11.5–14.5)
EST. GFR  (AFRICAN AMERICAN): >60 ML/MIN/1.73 M^2
EST. GFR  (NON AFRICAN AMERICAN): >60 ML/MIN/1.73 M^2
GLUCOSE SERPL-MCNC: 173 MG/DL (ref 70–110)
HCT VFR BLD AUTO: 42.2 % (ref 37–48.5)
HGB BLD-MCNC: 12.3 G/DL (ref 12–16)
IMM GRANULOCYTES # BLD AUTO: 0.01 K/UL (ref 0–0.04)
IMM GRANULOCYTES NFR BLD AUTO: 0.2 % (ref 0–0.5)
LYMPHOCYTES # BLD AUTO: 0.6 K/UL (ref 1–4.8)
LYMPHOCYTES NFR BLD: 11.7 % (ref 18–48)
MAGNESIUM SERPL-MCNC: 2.6 MG/DL (ref 1.6–2.6)
MCH RBC QN AUTO: 28.3 PG (ref 27–31)
MCHC RBC AUTO-ENTMCNC: 29.1 G/DL (ref 32–36)
MCV RBC AUTO: 97 FL (ref 82–98)
MONOCYTES # BLD AUTO: 0.3 K/UL (ref 0.3–1)
MONOCYTES NFR BLD: 4.8 % (ref 4–15)
NEUTROPHILS # BLD AUTO: 4.5 K/UL (ref 1.8–7.7)
NEUTROPHILS NFR BLD: 83.3 % (ref 38–73)
NRBC BLD-RTO: 0 /100 WBC
PLATELET # BLD AUTO: 214 K/UL (ref 150–450)
PMV BLD AUTO: 9.6 FL (ref 9.2–12.9)
POTASSIUM SERPL-SCNC: 5 MMOL/L (ref 3.5–5.1)
PROT SERPL-MCNC: 6.1 G/DL (ref 6–8.4)
RBC # BLD AUTO: 4.35 M/UL (ref 4–5.4)
SODIUM SERPL-SCNC: 140 MMOL/L (ref 136–145)
WBC # BLD AUTO: 5.38 K/UL (ref 3.9–12.7)

## 2022-04-11 PROCEDURE — 63600175 PHARM REV CODE 636 W HCPCS: Performed by: INTERNAL MEDICINE

## 2022-04-11 PROCEDURE — 36415 COLL VENOUS BLD VENIPUNCTURE: CPT | Performed by: NURSE PRACTITIONER

## 2022-04-11 PROCEDURE — 94761 N-INVAS EAR/PLS OXIMETRY MLT: CPT

## 2022-04-11 PROCEDURE — 99900031 HC PATIENT EDUCATION (STAT)

## 2022-04-11 PROCEDURE — 85025 COMPLETE CBC W/AUTO DIFF WBC: CPT | Performed by: NURSE PRACTITIONER

## 2022-04-11 PROCEDURE — 27000221 HC OXYGEN, UP TO 24 HOURS

## 2022-04-11 PROCEDURE — 11000001 HC ACUTE MED/SURG PRIVATE ROOM

## 2022-04-11 PROCEDURE — 25000003 PHARM REV CODE 250: Performed by: INTERNAL MEDICINE

## 2022-04-11 PROCEDURE — 25000003 PHARM REV CODE 250: Performed by: NURSE PRACTITIONER

## 2022-04-11 PROCEDURE — 63600175 PHARM REV CODE 636 W HCPCS: Performed by: NURSE PRACTITIONER

## 2022-04-11 PROCEDURE — 80053 COMPREHEN METABOLIC PANEL: CPT | Performed by: NURSE PRACTITIONER

## 2022-04-11 PROCEDURE — 94640 AIRWAY INHALATION TREATMENT: CPT

## 2022-04-11 PROCEDURE — 94660 CPAP INITIATION&MGMT: CPT

## 2022-04-11 PROCEDURE — 83735 ASSAY OF MAGNESIUM: CPT | Performed by: NURSE PRACTITIONER

## 2022-04-11 PROCEDURE — 25000242 PHARM REV CODE 250 ALT 637 W/ HCPCS: Performed by: NURSE PRACTITIONER

## 2022-04-11 PROCEDURE — 99900035 HC TECH TIME PER 15 MIN (STAT)

## 2022-04-11 PROCEDURE — 97161 PT EVAL LOW COMPLEX 20 MIN: CPT

## 2022-04-11 RX ORDER — ASPIRIN 81 MG/1
81 TABLET ORAL DAILY
Status: DISCONTINUED | OUTPATIENT
Start: 2022-04-11 | End: 2022-04-12 | Stop reason: HOSPADM

## 2022-04-11 RX ORDER — ENOXAPARIN SODIUM 100 MG/ML
40 INJECTION SUBCUTANEOUS EVERY 24 HOURS
Status: DISCONTINUED | OUTPATIENT
Start: 2022-04-11 | End: 2022-04-12 | Stop reason: HOSPADM

## 2022-04-11 RX ORDER — PANTOPRAZOLE SODIUM 40 MG/1
40 TABLET, DELAYED RELEASE ORAL DAILY
Status: DISCONTINUED | OUTPATIENT
Start: 2022-04-12 | End: 2022-04-12 | Stop reason: HOSPADM

## 2022-04-11 RX ADMIN — GABAPENTIN 300 MG: 300 CAPSULE ORAL at 09:04

## 2022-04-11 RX ADMIN — PRAVASTATIN SODIUM 40 MG: 40 TABLET ORAL at 09:04

## 2022-04-11 RX ADMIN — HYDROCODONE BITARTRATE AND ACETAMINOPHEN 1 TABLET: 5; 325 TABLET ORAL at 07:04

## 2022-04-11 RX ADMIN — MUPIROCIN: 20 OINTMENT TOPICAL at 10:04

## 2022-04-11 RX ADMIN — ISOSORBIDE MONONITRATE 60 MG: 30 TABLET, EXTENDED RELEASE ORAL at 09:04

## 2022-04-11 RX ADMIN — METHYLPREDNISOLONE SODIUM SUCCINATE 80 MG: 125 INJECTION, POWDER, FOR SOLUTION INTRAMUSCULAR; INTRAVENOUS at 09:04

## 2022-04-11 RX ADMIN — FUROSEMIDE 40 MG: 40 TABLET ORAL at 09:04

## 2022-04-11 RX ADMIN — DONEPEZIL HYDROCHLORIDE 10 MG: 5 TABLET ORAL at 09:04

## 2022-04-11 RX ADMIN — DULOXETINE HYDROCHLORIDE 60 MG: 60 CAPSULE, DELAYED RELEASE ORAL at 09:04

## 2022-04-11 RX ADMIN — ASPIRIN 81 MG: 81 TABLET, COATED ORAL at 09:04

## 2022-04-11 RX ADMIN — FERROUS SULFATE TAB 325 MG (65 MG ELEMENTAL FE) 1 EACH: 325 (65 FE) TAB at 09:04

## 2022-04-11 RX ADMIN — IPRATROPIUM BROMIDE AND ALBUTEROL SULFATE 3 ML: 2.5; .5 SOLUTION RESPIRATORY (INHALATION) at 08:04

## 2022-04-11 RX ADMIN — MEMANTINE 10 MG: 10 TABLET ORAL at 09:04

## 2022-04-11 RX ADMIN — FUROSEMIDE 40 MG: 40 TABLET ORAL at 05:04

## 2022-04-11 RX ADMIN — MUPIROCIN: 20 OINTMENT TOPICAL at 09:04

## 2022-04-11 RX ADMIN — CEFTRIAXONE 1 G: 1 INJECTION, SOLUTION INTRAVENOUS at 12:04

## 2022-04-11 RX ADMIN — ENOXAPARIN SODIUM 40 MG: 40 INJECTION SUBCUTANEOUS at 09:04

## 2022-04-11 RX ADMIN — ACETAMINOPHEN 650 MG: 325 TABLET ORAL at 04:04

## 2022-04-11 RX ADMIN — METHYLPREDNISOLONE SODIUM SUCCINATE 80 MG: 125 INJECTION, POWDER, FOR SOLUTION INTRAMUSCULAR; INTRAVENOUS at 07:04

## 2022-04-11 RX ADMIN — HYDROCODONE BITARTRATE AND ACETAMINOPHEN 1 TABLET: 5; 325 TABLET ORAL at 09:04

## 2022-04-11 RX ADMIN — IPRATROPIUM BROMIDE AND ALBUTEROL SULFATE 3 ML: 2.5; .5 SOLUTION RESPIRATORY (INHALATION) at 01:04

## 2022-04-11 RX ADMIN — IPRATROPIUM BROMIDE AND ALBUTEROL SULFATE 3 ML: 2.5; .5 SOLUTION RESPIRATORY (INHALATION) at 07:04

## 2022-04-11 NOTE — PROGRESS NOTES
[facility] Medicine  Progress Note    Patient Name: Martina Betancourt  MRN: [id number]  Patient Class: IP- Inpatient   Admission Date: 4/6/2022  Length of Stay: 5 days  Attending Physician: Joe Fuller III, MD  Primary Care Provider: Joe Fuller III, MD        Subjective:     Principal Problem:Sepsis        HPI:  70 yo female with extensive pmhx including dementia, chronic respiratory failure on home O2 is here via EMS from home with complaint of SOB and CP x 1 day. No aggravating or alleviating factors. EMS reports sat on 2L was 66%. Given duoneb abd solumedrol en route. Similar past presentations. Dementia limits this history. Spoke with Sharon aranda daughter for history and reports that she is DNR      Overview/Hospital Course:  4/8/22 [employer] patient was awake and alert eating breakfast and reports feeling better. Complains of cough  4/9 ND Pt is feelign better- repotrs breathign has improved but still coughing a lot  4/10 pt repotrs feelign ok - resp repotrs havign to increase her O2 to get her in the low 90%.  Cxr reviewed  4/11/2022 FM:  Patient is awake alert able to recall my name and eating breakfast.  I spoken to the daughter who states that they should be able to accept the patient home tomorrow.  Overall her symptoms of sepsis and urinary tract infection are much improved.      Past Medical History:   Diagnosis Date    Alzheimer disease     Alzheimer disease     Coronary artery disease     Hyperlipidemia     Hypertension     Myopathy     Non-ST elevation (NSTEMI) myocardial infarction     Obesity     Pneumonia     Sleep apnea        Past Surgical History:   Procedure Laterality Date    APPENDECTOMY      APPENDECTOMY      CHOLECYSTECTOMY      CORONARY STENT PLACEMENT      HYSTERECTOMY      SURGICAL REMOVAL OF VERTEBRAL BODY OF THORACIC SPINE N/A 12/8/2020    Procedure: T9-T10 corpectomy, posterior instrumented fusion T7-T12.;  Surgeon: Everardo Gongora MD;  Location:  [facility] OR 2ND FLR;  Service: Neurosurgery;  Laterality: N/A;    TONSILLECTOMY         Review of patient's allergies indicates:   Allergen Reactions    Tizanidine        No current facility-administered medications on file prior to encounter.     Current Outpatient Medications on File Prior to Encounter   Medication Sig    acetaminophen (TYLENOL) 325 MG tablet Take 2 tablets (650 mg total) by mouth every 6 (six) hours as needed (HEADACHE, TEMPERATURE - FEVER > 100.4). (Patient not taking: Reported on 1/19/2021)    albuterol-ipratropium (DUO-NEB) 2.5 mg-0.5 mg/3 mL nebulizer solution Take 3 mLs by nebulization every 6 (six) hours as needed for Wheezing or Shortness of Breath. Rescue (Patient not taking: Reported on 1/19/2021)    aluminum & magnesium hydroxide-simethicone (MYLANTA MAX STRENGTH) 400-400-40 mg/5 mL suspension Take 30 mLs by mouth every 6 (six) hours as needed for Indigestion. (Patient not taking: Reported on 1/19/2021)    aspirin (ECOTRIN) 81 MG EC tablet Take 81 mg by mouth once daily.    bacitracin zinc 500 unit/gram OiPk Apply topically 2 (two) times daily.    clopidogreL (PLAVIX) 75 mg tablet Take 1 tablet (75 mg total) by mouth once daily.    donepeziL (ARICEPT) 10 MG tablet Take 10 mg by mouth every evening.    doxycycline (VIBRA-TABS) 100 MG tablet take one tablet by mouth twice daily (every 12 hours) for infection until all gone. take with food. thank you!!!    DULoxetine (CYMBALTA) 60 MG capsule Take 60 mg by mouth once daily.    ferrous sulfate 324 mg (65 mg iron) TbEC Take 325 mg by mouth every evening.    furosemide (LASIX) 40 MG tablet Take 1 tablet (40 mg total) by mouth once daily.    isosorbide mononitrate (IMDUR) 60 MG 24 hr tablet Take 60 mg by mouth once daily.    memantine (NAMENDA) 10 MG Tab Take 1 tablet (10 mg total) by mouth 2 (two) times daily.    methocarbamoL (ROBAXIN) 750 MG Tab Take 1 tablet (750 mg total) by mouth 3 (three) times daily as needed.    miconazole  NITRATE 2 % (MICOTIN) 2 % top powder Apply topically 2 (two) times daily. for 15 days    ondansetron (ZOFRAN-ODT) 4 MG TbDL Take 1 tablet (4 mg total) by mouth every 8 (eight) hours as needed. (Patient not taking: Reported on 2021)    oxyCODONE (ROXICODONE) 10 mg Tab immediate release tablet Take 1 tablet (10 mg total) by mouth every 6 (six) hours as needed for Pain.    pantoprazole (PROTONIX) 40 MG tablet Take 1 tablet (40 mg total) by mouth before breakfast.    potassium chloride SA (K-DUR,KLOR-CON) 20 MEQ tablet Take 1 tablet (20 mEq total) by mouth once daily.    pravastatin (PRAVACHOL) 40 MG tablet Take 40 mg by mouth every evening.    senna-docusate 8.6-50 mg (PERICOLACE) 8.6-50 mg per tablet Take 2 tablets by mouth nightly as needed for Constipation.     Family History       Problem Relation (Age of Onset)    Hypertension Father    Stroke Mother          Tobacco Use    Smoking status: Former Smoker     Types: Cigarettes     Quit date: 2000     Years since quittin.2    Smokeless tobacco: Never Used   Substance and Sexual Activity    Alcohol use: Not Currently    Drug use: Never    Sexual activity: Not Currently     Review of Systems   Constitutional:  Negative for activity change, appetite change, chills, diaphoresis, fatigue, fever and unexpected weight change.   HENT:  Negative for congestion, postnasal drip, sore throat and trouble swallowing.    Eyes:  Negative for discharge.   Respiratory:  Positive for cough and shortness of breath. Negative for chest tightness and wheezing.    Cardiovascular:  Negative for chest pain, palpitations and leg swelling.   Gastrointestinal:  Negative for abdominal pain, blood in stool, constipation, diarrhea, nausea and vomiting.   Genitourinary:  Negative for decreased urine volume, difficulty urinating, dysuria, hematuria and urgency.   Musculoskeletal:  Negative for arthralgias.   Skin:  Negative for rash and wound.   Neurological:  Negative for  dizziness, speech difficulty, numbness and headaches.   Psychiatric/Behavioral:  Negative for agitation and sleep disturbance.    Objective:     Vital Signs (Most Recent):  Temp: 97.5 °F (36.4 °C) (04/11/22 0718)  Pulse: (!) 52 (04/11/22 0725)  Resp: 20 (04/11/22 0752)  BP: (!) 144/73 (04/11/22 0718)  SpO2: 97 % (04/11/22 0725)   Vital Signs (24h Range):  Temp:  [97.5 °F (36.4 °C)-98.6 °F (37 °C)] 97.5 °F (36.4 °C)  Pulse:  [51-75] 52  Resp:  [15-28] 20  SpO2:  [92 %-100 %] 97 %  BP: (110-161)/(48-73) 144/73     Weight: (!) 161.9 kg (357 lb)  Body mass index is 52.72 kg/m².    Physical Exam  Vitals and nursing note reviewed.   Constitutional:       Appearance: Normal appearance. She is obese.   HENT:      Head: Normocephalic and atraumatic.      Right Ear: Hearing normal.      Left Ear: Hearing normal.      Nose: Nose normal.      Mouth/Throat:      Lips: Pink.      Mouth: Mucous membranes are moist.   Eyes:      Pupils: Pupils are equal, round, and reactive to light.   Cardiovascular:      Rate and Rhythm: Normal rate and regular rhythm.      Pulses: Normal pulses.      Heart sounds: Normal heart sounds.   Pulmonary:      Effort: Pulmonary effort is normal.      Comments: Continuous oxygen, bipap prn; coarse bs baldo  Abdominal:      General: Abdomen is flat. Bowel sounds are normal. There is no distension.      Palpations: Abdomen is soft.      Tenderness: There is no abdominal tenderness.   Genitourinary:     Comments: Yang with yellow urine  Musculoskeletal:         General: Normal range of motion.      Cervical back: Full passive range of motion without pain and normal range of motion.   Skin:     General: Skin is warm and dry.      Capillary Refill: Capillary refill takes less than 2 seconds.   Neurological:      General: No focal deficit present.      Mental Status: Mental status is at baseline.      Cranial Nerves: Cranial nerves are intact.         CRANIAL NERVES     CN III, IV, VI   Pupils are equal, round,  and reactive to light.     Significant Labs: All pertinent labs within the past 24 hours have been reviewed.  Recent Lab Results         04/11/22  0321        Albumin 2.7       Alkaline Phosphatase 95       ALT 26       Anion Gap 0       AST 11       Baso # 0.00       Basophil % 0.0       BILIRUBIN TOTAL 0.2  Comment: For infants and newborns, interpretation of results should be based  on gestational age, weight and in agreement with clinical  observations.    Premature Infant recommended reference ranges:  Up to 24 hours.............<8.0 mg/dL  Up to 48 hours............<12.0 mg/dL  3-5 days..................<15.0 mg/dL  6-29 days.................<15.0 mg/dL    For patients on Eltrombopag therapy, use of Dimension [place] TBIL is   not   recommended.         BUN 27       Calcium 8.7       Chloride 100       CO2 40       Creatinine 0.8       Differential Method Automated       eGFR if  >60.0       eGFR if non  >60.0  Comment: Calculation used to obtain the estimated glomerular filtration  rate (eGFR) is the CKD-EPI equation.          Eos # 0.0       Eosinophil % 0.0       Glucose 173       Gran # (ANC) 4.5       Gran % 83.3       Hematocrit 42.2       Hemoglobin 12.3       Immature Grans (Abs) 0.01  Comment: Mild elevation in immature granulocytes is non specific and   can be seen in a variety of conditions including stress response,   acute inflammation, trauma and pregnancy. Correlation with other   laboratory and clinical findings is essential.         Immature Granulocytes 0.2       Lymph # 0.6       Lymph % 11.7       Magnesium 2.6       MCH 28.3       MCHC 29.1       MCV 97       Mono # 0.3       Mono % 4.8       MPV 9.6       nRBC 0       Platelets 214       Potassium 5.0       PROTEIN TOTAL 6.1       RBC 4.35       RDW 15.0       Sodium 140       WBC 5.38               Significant Imaging:   X-Ray Chest 1 View  Narrative: EXAMINATION:  XR CHEST 1 VIEW    CLINICAL  HISTORY:  Cough, shortness of breath    COMPARISON:  04/06/2022    FINDINGS:  Cardiac silhouette is unchanged.  There is improved aeration of the left lung base.  New opacity at the perihilar right lung may represent atelectasis or developing infiltrate.  No obvious pleural effusion.  Impression: As above.    Electronically signed by: Sunny Swift MD  Date:    04/10/2022  Time:    10:20       Assessment/Plan:      * Sepsis  This patient does have evidence of infective focus  My overall impression is sepsis. Vital signs were reviewed and noted in progress note.  Antibiotics given-   Antibiotics (From admission, onward)            Start     Stop Route Frequency Ordered    04/08/22 1100  mupirocin 2 % ointment         04/13 0859 Nasl 2 times daily 04/08/22 0947    04/07/22 0031  cefTRIAXone (ROCEPHIN) 1 g/50 mL D5W IVPB         -- IV Every 24 hours (non-standard times) 04/07/22 0031        Cultures were taken-   Microbiology Results (last 7 days)     Procedure Component Value Units Date/Time    Blood culture x two cultures. Draw prior to antibiotics. [[id number]] Collected: 04/06/22 2159    Order Status: Completed Specimen: Blood Updated: 04/11/22 0612     Blood Culture, Routine No Growth to date      No Growth to date      No Growth to date      No Growth to date      No Growth to date    Narrative:      Aerobic and anaerobic    Blood culture x two cultures. Draw prior to antibiotics. [[id number]] Collected: 04/06/22 2207    Order Status: Completed Specimen: Blood Updated: 04/11/22 0612     Blood Culture, Routine No Growth to date      No Growth to date      No Growth to date      No Growth to date      No Growth to date    Narrative:      Aerobic and anaerobic    Urine culture [[id number]] Collected: 04/06/22 2219    Order Status: Completed Specimen: Urine Updated: 04/08/22 0955     Urine Culture, Routine Multiple organisms isolated. None in predominance.  Repeat if      clinically necessary.    Narrative:      Preferred  Collection Type->Urine, Clean Catch  Specimen Source->Urine        Latest lactate reviewed, they are-  No results for input(s): LACTATE in the last 72 hours.    Organ dysfunction indicated by Encephalopathy  and Acute respiratory failure   4/9 ms improved and reps status improving.  Continue IV antibiotics for patient's  4/10 cotn iv abxs    Much improved.    AMS (altered mental status)  Resolved likely at baseline  4/9 appears at baseline      Bedbound        Acute on chronic respiratory failure with hypoxia and hypercapnia  Patient with Hypercapnic and Hypoxic Respiratory failure which is Acute on chronic.  she is on home oxygen at 3  LPM. Supplemental oxygen was provided and noted- Oxygen Concentration (%):  [40-50] 50.   Signs/symptoms of respiratory failure include- tachypnea, use of accessory muscles and lethargy. Contributing diagnoses includes - COPD, Interstitial lung disease and Obesity Hypoventilation Labs and images were reviewed. Patient Has recent ABG, which has been reviewed. Will treat underlying causes and adjust management of respiratory failure as follows-     Much improved, stopping steroids.    Urinary tract infection without hematuria  Continue rocephin and await final urine cultures  4/9 continue IV antibiotics    cont abxs      Severe obesity (BMI >= 40)  Body mass index is 52.72 kg/m². Morbid obesity complicates all aspects of disease management from diagnostic modalities to treatment. Weight loss encouraged and health benefits explained to patient.         Dementia without behavioral disturbance  Patient appears to be at baseline today answering questions and pleasant; continue Aricept and Namenda        VTE Risk Mitigation (From admission, onward)         Ordered     enoxaparin injection 40 mg  Daily         04/11/22 0804     IP VTE HIGH RISK PATIENT  Once         04/07/22 0031     Place sequential compression device  Until discontinued         04/07/22 0031                Discharge  Planning   BRIT:      Code Status: DNR   Is the patient medically ready for discharge?:     Reason for patient still in hospital (select all that apply): Patient trending condition  Discharge Plan A: Home with family, Home Health                  Joe Fuller III, MD  Department of Hospital Medicine   [facility]

## 2022-04-11 NOTE — SUBJECTIVE & OBJECTIVE
Past Medical History:   Diagnosis Date    Alzheimer disease     Alzheimer disease     Coronary artery disease     Hyperlipidemia     Hypertension     Myopathy     Non-ST elevation (NSTEMI) myocardial infarction     Obesity     Pneumonia     Sleep apnea        Past Surgical History:   Procedure Laterality Date    APPENDECTOMY      APPENDECTOMY      CHOLECYSTECTOMY      CORONARY STENT PLACEMENT      HYSTERECTOMY      SURGICAL REMOVAL OF VERTEBRAL BODY OF THORACIC SPINE N/A 12/8/2020    Procedure: T9-T10 corpectomy, posterior instrumented fusion T7-T12.;  Surgeon: Everardo Gongora MD;  Location: [facility] OR [address] [place] [address];  Service: Neurosurgery;  Laterality: N/A;    TONSILLECTOMY         Review of patient's allergies indicates:   Allergen Reactions    Tizanidine        No current facility-administered medications on file prior to encounter.     Current Outpatient Medications on File Prior to Encounter   Medication Sig    acetaminophen (TYLENOL) 325 MG tablet Take 2 tablets (650 mg total) by mouth every 6 (six) hours as needed (HEADACHE, TEMPERATURE - FEVER > 100.4). (Patient not taking: Reported on 1/19/2021)    albuterol-ipratropium (DUO-NEB) 2.5 mg-0.5 mg/3 mL nebulizer solution Take 3 mLs by nebulization every 6 (six) hours as needed for Wheezing or Shortness of Breath. Rescue (Patient not taking: Reported on 1/19/2021)    aluminum & magnesium hydroxide-simethicone (MYLANTA MAX STRENGTH) 400-400-40 mg/5 mL suspension Take 30 mLs by mouth every 6 (six) hours as needed for Indigestion. (Patient not taking: Reported on 1/19/2021)    aspirin (ECOTRIN) 81 MG EC tablet Take 81 mg by mouth once daily.    bacitracin zinc 500 unit/gram OiPk Apply topically 2 (two) times daily.    clopidogreL (PLAVIX) 75 mg tablet Take 1 tablet (75 mg total) by mouth once daily.    donepeziL (ARICEPT) 10 MG tablet Take 10 mg by mouth every evening.    doxycycline (VIBRA-TABS) 100 MG tablet take one tablet by mouth twice daily (every 12 hours) for  infection until all gone. take with food. thank you!!!    DULoxetine (CYMBALTA) 60 MG capsule Take 60 mg by mouth once daily.    ferrous sulfate 324 mg (65 mg iron) TbEC Take 325 mg by mouth every evening.    furosemide (LASIX) 40 MG tablet Take 1 tablet (40 mg total) by mouth once daily.    isosorbide mononitrate (IMDUR) 60 MG 24 hr tablet Take 60 mg by mouth once daily.    memantine (NAMENDA) 10 MG Tab Take 1 tablet (10 mg total) by mouth 2 (two) times daily.    methocarbamoL (ROBAXIN) 750 MG Tab Take 1 tablet (750 mg total) by mouth 3 (three) times daily as needed.    miconazole NITRATE 2 % (MICOTIN) 2 % top powder Apply topically 2 (two) times daily. for 15 days    ondansetron (ZOFRAN-ODT) 4 MG TbDL Take 1 tablet (4 mg total) by mouth every 8 (eight) hours as needed. (Patient not taking: Reported on 2021)    oxyCODONE (ROXICODONE) 10 mg Tab immediate release tablet Take 1 tablet (10 mg total) by mouth every 6 (six) hours as needed for Pain.    pantoprazole (PROTONIX) 40 MG tablet Take 1 tablet (40 mg total) by mouth before breakfast.    potassium chloride SA (K-DUR,KLOR-CON) 20 MEQ tablet Take 1 tablet (20 mEq total) by mouth once daily.    pravastatin (PRAVACHOL) 40 MG tablet Take 40 mg by mouth every evening.    senna-docusate 8.6-50 mg (PERICOLACE) 8.6-50 mg per tablet Take 2 tablets by mouth nightly as needed for Constipation.     Family History       Problem Relation (Age of Onset)    Hypertension Father    Stroke Mother          Tobacco Use    Smoking status: Former Smoker     Types: Cigarettes     Quit date: 2000     Years since quittin.2    Smokeless tobacco: Never Used   Substance and Sexual Activity    Alcohol use: Not Currently    Drug use: Never    Sexual activity: Not Currently     Review of Systems   Constitutional:  Negative for activity change, appetite change, chills, diaphoresis, fatigue, fever and unexpected weight change.   HENT:  Negative for congestion, postnasal drip, sore  throat and trouble swallowing.    Eyes:  Negative for discharge.   Respiratory:  Positive for cough and shortness of breath. Negative for chest tightness and wheezing.    Cardiovascular:  Negative for chest pain, palpitations and leg swelling.   Gastrointestinal:  Negative for abdominal pain, blood in stool, constipation, diarrhea, nausea and vomiting.   Genitourinary:  Negative for decreased urine volume, difficulty urinating, dysuria, hematuria and urgency.   Musculoskeletal:  Negative for arthralgias.   Skin:  Negative for rash and wound.   Neurological:  Negative for dizziness, speech difficulty, numbness and headaches.   Psychiatric/Behavioral:  Negative for agitation and sleep disturbance.    Objective:     Vital Signs (Most Recent):  Temp: 97.5 °F (36.4 °C) (04/11/22 0718)  Pulse: (!) 52 (04/11/22 0725)  Resp: 20 (04/11/22 0752)  BP: (!) 144/73 (04/11/22 0718)  SpO2: 97 % (04/11/22 0725)   Vital Signs (24h Range):  Temp:  [97.5 °F (36.4 °C)-98.6 °F (37 °C)] 97.5 °F (36.4 °C)  Pulse:  [51-75] 52  Resp:  [15-28] 20  SpO2:  [92 %-100 %] 97 %  BP: (110-161)/(48-73) 144/73     Weight: (!) 161.9 kg (357 lb)  Body mass index is 52.72 kg/m².    Physical Exam  Vitals and nursing note reviewed.   Constitutional:       Appearance: Normal appearance. She is obese.   HENT:      Head: Normocephalic and atraumatic.      Right Ear: Hearing normal.      Left Ear: Hearing normal.      Nose: Nose normal.      Mouth/Throat:      Lips: Pink.      Mouth: Mucous membranes are moist.   Eyes:      Pupils: Pupils are equal, round, and reactive to light.   Cardiovascular:      Rate and Rhythm: Normal rate and regular rhythm.      Pulses: Normal pulses.      Heart sounds: Normal heart sounds.   Pulmonary:      Effort: Pulmonary effort is normal.      Comments: Continuous oxygen, bipap prn; coarse bs baldo  Abdominal:      General: Abdomen is flat. Bowel sounds are normal. There is no distension.      Palpations: Abdomen is soft.       Tenderness: There is no abdominal tenderness.   Genitourinary:     Comments: Yang with yellow urine  Musculoskeletal:         General: Normal range of motion.      Cervical back: Full passive range of motion without pain and normal range of motion.   Skin:     General: Skin is warm and dry.      Capillary Refill: Capillary refill takes less than 2 seconds.   Neurological:      General: No focal deficit present.      Mental Status: Mental status is at baseline.      Cranial Nerves: Cranial nerves are intact.         CRANIAL NERVES     CN III, IV, VI   Pupils are equal, round, and reactive to light.     Significant Labs: All pertinent labs within the past 24 hours have been reviewed.  Recent Lab Results         04/11/22  0321        Albumin 2.7       Alkaline Phosphatase 95       ALT 26       Anion Gap 0       AST 11       Baso # 0.00       Basophil % 0.0       BILIRUBIN TOTAL 0.2  Comment: For infants and newborns, interpretation of results should be based  on gestational age, weight and in agreement with clinical  observations.    Premature Infant recommended reference ranges:  Up to 24 hours.............<8.0 mg/dL  Up to 48 hours............<12.0 mg/dL  3-5 days..................<15.0 mg/dL  6-29 days.................<15.0 mg/dL    For patients on Eltrombopag therapy, use of Dimension [place] TBIL is   not   recommended.         BUN 27       Calcium 8.7       Chloride 100       CO2 40       Creatinine 0.8       Differential Method Automated       eGFR if  >60.0       eGFR if non  >60.0  Comment: Calculation used to obtain the estimated glomerular filtration  rate (eGFR) is the CKD-EPI equation.          Eos # 0.0       Eosinophil % 0.0       Glucose 173       Gran # (ANC) 4.5       Gran % 83.3       Hematocrit 42.2       Hemoglobin 12.3       Immature Grans (Abs) 0.01  Comment: Mild elevation in immature granulocytes is non specific and   can be seen in a variety of conditions  including stress response,   acute inflammation, trauma and pregnancy. Correlation with other   laboratory and clinical findings is essential.         Immature Granulocytes 0.2       Lymph # 0.6       Lymph % 11.7       Magnesium 2.6       MCH 28.3       MCHC 29.1       MCV 97       Mono # 0.3       Mono % 4.8       MPV 9.6       nRBC 0       Platelets 214       Potassium 5.0       PROTEIN TOTAL 6.1       RBC 4.35       RDW 15.0       Sodium 140       WBC 5.38               Significant Imaging:   X-Ray Chest 1 View  Narrative: EXAMINATION:  XR CHEST 1 VIEW    CLINICAL HISTORY:  Cough, shortness of breath    COMPARISON:  04/06/2022    FINDINGS:  Cardiac silhouette is unchanged.  There is improved aeration of the left lung base.  New opacity at the perihilar right lung may represent atelectasis or developing infiltrate.  No obvious pleural effusion.  Impression: As above.    Electronically signed by: Sunny Swift MD  Date:    04/10/2022  Time:    10:20

## 2022-04-11 NOTE — PT/OT/SLP EVAL
"Physical Therapy  Evaluation    Martina Betancourt   MRN: [id number]   Admitting Diagnosis: Sepsis                          Billable Minutes:  Evaluation 20    Diagnosis: Sepsis      Past Medical History:   Diagnosis Date    Alzheimer disease     Alzheimer disease     Coronary artery disease     Hyperlipidemia     Hypertension     Myopathy     Non-ST elevation (NSTEMI) myocardial infarction     Obesity     Pneumonia     Sleep apnea       Past Surgical History:   Procedure Laterality Date    APPENDECTOMY      APPENDECTOMY      CHOLECYSTECTOMY      CORONARY STENT PLACEMENT      HYSTERECTOMY      SURGICAL REMOVAL OF VERTEBRAL BODY OF THORACIC SPINE N/A 12/8/2020    Procedure: T9-T10 corpectomy, posterior instrumented fusion T7-T12.;  Surgeon: Everardo Gongora MD;  Location: [facility] OR [address] [place] [address];  Service: Neurosurgery;  Laterality: N/A;    TONSILLECTOMY         Referring physician: Alina  Date referred to PT: 4/11/22    General Precautions: Standard,  O2         Patient History:     DME owned (not currently used): wheelchair, hospital bed and yesenia lift; Home O2 continuously    Previous Level of Function:    Pt is bed bound primarily and she does get up to WC occasionally via yesenia lift. Her daughter is her primary caregiver and she has good family support; multiple grandson's who assist. She is provided bed bath; toileting via diaper and transportation to medical appointment's via ambulance    Subjective:  Communicated with nurse prior to session.    Pain: 4/10 FACES scale for generalized body aches and R shoulder from prior RTC and B sciatica "since I was 21 years old"    Chief Complaint: none; pt states she feels better overall and is hoping to go home soon  Patient goals: to go home           Objective:         Cognitive Exam:  Oriented to: Person, Place and Situation    Follows Commands/attention: Follows two-step commands  Communication: clear/fluent  Safety awareness/insight to disability: intact    Physical " Exam:  Postural examination/scapula alignment:    -       Rounded shoulders  -       Forward head      Sensation:   Impaired light touch B LE's    Lower Extremity Range of Motion:  Right Lower Extremity: WFL passively; AROM hip/knee ~30 % ROM  Left Lower Extremity: as R LE    Lower Extremity Strength:  Right Lower Extremity: 3-/5 ankles; knee/hips 2-/5  Left Lower Extremity: 3-/5 ankles; Knee/hips 2-/5       Functional Mobility:  Bed Mobility:   -rolling total assist using sheet under pt  -scooting laterally and up in bed total assist x 2    Transfers:   NA    Gait:    NA    Therapeutic Activities and Exercises:  AAROM of B ankles, hips and knees in supine though hips mostly passive as pt is very weak    Patient left HOB elevated with all lines intact and call button in reach.    Assessment:   Martina Betancourt is a 71 y.o. female with a medical diagnosis of Sepsis and presents with total care at home prior requiring yesenia lift transfers, bed baths and toileting; total assist positioning. Pt currently at baseline status, no skilled therapy recommended at this time.    Rehab identified problem list/impairments:      Rehab potential is poor.    Activity tolerance: Poor    Discharge recommendations:   none    Barriers to discharge:  none    Equipment recommendations:   none    GOALS:   Multidisciplinary Problems     Physical Therapy Goals     Not on file                PLAN:    Pt at baseline of total care function; no skilled therapy recommended; PT to sign off.        04/11/2022

## 2022-04-11 NOTE — ASSESSMENT & PLAN NOTE
Patient with Hypercapnic and Hypoxic Respiratory failure which is Acute on chronic.  she is on home oxygen at 3  LPM. Supplemental oxygen was provided and noted- Oxygen Concentration (%):  [40-50] 50.   Signs/symptoms of respiratory failure include- tachypnea, use of accessory muscles and lethargy. Contributing diagnoses includes - COPD, Interstitial lung disease and Obesity Hypoventilation Labs and images were reviewed. Patient Has recent ABG, which has been reviewed. Will treat underlying causes and adjust management of respiratory failure as follows-     Much improved, stopping steroids.

## 2022-04-11 NOTE — ASSESSMENT & PLAN NOTE
This patient does have evidence of infective focus  My overall impression is sepsis. Vital signs were reviewed and noted in progress note.  Antibiotics given-   Antibiotics (From admission, onward)            Start     Stop Route Frequency Ordered    04/08/22 1100  mupirocin 2 % ointment         04/13 0859 Nasl 2 times daily 04/08/22 0947    04/07/22 0031  cefTRIAXone (ROCEPHIN) 1 g/50 mL D5W IVPB         -- IV Every 24 hours (non-standard times) 04/07/22 0031        Cultures were taken-   Microbiology Results (last 7 days)     Procedure Component Value Units Date/Time    Blood culture x two cultures. Draw prior to antibiotics. [[id number]] Collected: 04/06/22 2159    Order Status: Completed Specimen: Blood Updated: 04/11/22 0612     Blood Culture, Routine No Growth to date      No Growth to date      No Growth to date      No Growth to date      No Growth to date    Narrative:      Aerobic and anaerobic    Blood culture x two cultures. Draw prior to antibiotics. [[id number]] Collected: 04/06/22 2207    Order Status: Completed Specimen: Blood Updated: 04/11/22 0612     Blood Culture, Routine No Growth to date      No Growth to date      No Growth to date      No Growth to date      No Growth to date    Narrative:      Aerobic and anaerobic    Urine culture [[id number]] Collected: 04/06/22 2219    Order Status: Completed Specimen: Urine Updated: 04/08/22 0955     Urine Culture, Routine Multiple organisms isolated. None in predominance.  Repeat if      clinically necessary.    Narrative:      Preferred Collection Type->Urine, Clean Catch  Specimen Source->Urine        Latest lactate reviewed, they are-  No results for input(s): LACTATE in the last 72 hours.    Organ dysfunction indicated by Encephalopathy  and Acute respiratory failure   4/9 ms improved and reps status improving.  Continue IV antibiotics for patient's  4/10 cotn iv abxs    Much improved.

## 2022-04-12 VITALS
HEART RATE: 60 BPM | BODY MASS INDEX: 43.4 KG/M2 | SYSTOLIC BLOOD PRESSURE: 155 MMHG | DIASTOLIC BLOOD PRESSURE: 72 MMHG | TEMPERATURE: 98 F | OXYGEN SATURATION: 93 % | HEIGHT: 69 IN | RESPIRATION RATE: 18 BRPM | WEIGHT: 293 LBS

## 2022-04-12 LAB
ALBUMIN SERPL BCP-MCNC: 2.8 G/DL (ref 3.5–5.2)
ALP SERPL-CCNC: 96 U/L (ref 55–135)
ALT SERPL W/O P-5'-P-CCNC: 51 U/L (ref 10–44)
ANION GAP SERPL CALC-SCNC: 3 MMOL/L (ref 8–16)
AST SERPL-CCNC: 19 U/L (ref 10–40)
BACTERIA BLD CULT: NORMAL
BACTERIA BLD CULT: NORMAL
BASOPHILS # BLD AUTO: 0 K/UL (ref 0–0.2)
BASOPHILS NFR BLD: 0 % (ref 0–1.9)
BILIRUB SERPL-MCNC: 0.3 MG/DL (ref 0.1–1)
BUN SERPL-MCNC: 31 MG/DL (ref 8–23)
CALCIUM SERPL-MCNC: 8.7 MG/DL (ref 8.7–10.5)
CHLORIDE SERPL-SCNC: 98 MMOL/L (ref 95–110)
CO2 SERPL-SCNC: 39 MMOL/L (ref 23–29)
CREAT SERPL-MCNC: 0.7 MG/DL (ref 0.5–1.4)
DIFFERENTIAL METHOD: ABNORMAL
EOSINOPHIL # BLD AUTO: 0 K/UL (ref 0–0.5)
EOSINOPHIL NFR BLD: 0 % (ref 0–8)
ERYTHROCYTE [DISTWIDTH] IN BLOOD BY AUTOMATED COUNT: 14.6 % (ref 11.5–14.5)
EST. GFR  (AFRICAN AMERICAN): >60 ML/MIN/1.73 M^2
EST. GFR  (NON AFRICAN AMERICAN): >60 ML/MIN/1.73 M^2
GLUCOSE SERPL-MCNC: 158 MG/DL (ref 70–110)
HCT VFR BLD AUTO: 43.4 % (ref 37–48.5)
HGB BLD-MCNC: 13.1 G/DL (ref 12–16)
IMM GRANULOCYTES # BLD AUTO: 0.01 K/UL (ref 0–0.04)
IMM GRANULOCYTES NFR BLD AUTO: 0.2 % (ref 0–0.5)
LYMPHOCYTES # BLD AUTO: 0.7 K/UL (ref 1–4.8)
LYMPHOCYTES NFR BLD: 14.3 % (ref 18–48)
MAGNESIUM SERPL-MCNC: 2.5 MG/DL (ref 1.6–2.6)
MCH RBC QN AUTO: 29 PG (ref 27–31)
MCHC RBC AUTO-ENTMCNC: 30.2 G/DL (ref 32–36)
MCV RBC AUTO: 96 FL (ref 82–98)
MONOCYTES # BLD AUTO: 0.3 K/UL (ref 0.3–1)
MONOCYTES NFR BLD: 6.1 % (ref 4–15)
NEUTROPHILS # BLD AUTO: 3.8 K/UL (ref 1.8–7.7)
NEUTROPHILS NFR BLD: 79.4 % (ref 38–73)
NRBC BLD-RTO: 0 /100 WBC
PLATELET # BLD AUTO: 209 K/UL (ref 150–450)
PMV BLD AUTO: 10.3 FL (ref 9.2–12.9)
POTASSIUM SERPL-SCNC: 4.6 MMOL/L (ref 3.5–5.1)
PROT SERPL-MCNC: 6.1 G/DL (ref 6–8.4)
RBC # BLD AUTO: 4.52 M/UL (ref 4–5.4)
SODIUM SERPL-SCNC: 140 MMOL/L (ref 136–145)
WBC # BLD AUTO: 4.74 K/UL (ref 3.9–12.7)

## 2022-04-12 PROCEDURE — 99900031 HC PATIENT EDUCATION (STAT)

## 2022-04-12 PROCEDURE — 25000003 PHARM REV CODE 250: Performed by: INTERNAL MEDICINE

## 2022-04-12 PROCEDURE — 94660 CPAP INITIATION&MGMT: CPT

## 2022-04-12 PROCEDURE — 83735 ASSAY OF MAGNESIUM: CPT | Performed by: NURSE PRACTITIONER

## 2022-04-12 PROCEDURE — 85025 COMPLETE CBC W/AUTO DIFF WBC: CPT | Performed by: NURSE PRACTITIONER

## 2022-04-12 PROCEDURE — 94640 AIRWAY INHALATION TREATMENT: CPT

## 2022-04-12 PROCEDURE — 94761 N-INVAS EAR/PLS OXIMETRY MLT: CPT

## 2022-04-12 PROCEDURE — 63600175 PHARM REV CODE 636 W HCPCS: Performed by: INTERNAL MEDICINE

## 2022-04-12 PROCEDURE — 99900035 HC TECH TIME PER 15 MIN (STAT)

## 2022-04-12 PROCEDURE — 27000221 HC OXYGEN, UP TO 24 HOURS

## 2022-04-12 PROCEDURE — 36415 COLL VENOUS BLD VENIPUNCTURE: CPT | Performed by: NURSE PRACTITIONER

## 2022-04-12 PROCEDURE — 25000242 PHARM REV CODE 250 ALT 637 W/ HCPCS: Performed by: NURSE PRACTITIONER

## 2022-04-12 PROCEDURE — 25000003 PHARM REV CODE 250: Performed by: NURSE PRACTITIONER

## 2022-04-12 PROCEDURE — 80053 COMPREHEN METABOLIC PANEL: CPT | Performed by: NURSE PRACTITIONER

## 2022-04-12 PROCEDURE — 63600175 PHARM REV CODE 636 W HCPCS: Performed by: NURSE PRACTITIONER

## 2022-04-12 RX ORDER — GABAPENTIN 300 MG/1
300 CAPSULE ORAL 2 TIMES DAILY
Qty: 180 CAPSULE | Refills: 1 | Status: SHIPPED | OUTPATIENT
Start: 2022-04-12 | End: 2022-07-11

## 2022-04-12 RX ADMIN — METHYLPREDNISOLONE SODIUM SUCCINATE 80 MG: 125 INJECTION, POWDER, FOR SOLUTION INTRAMUSCULAR; INTRAVENOUS at 08:04

## 2022-04-12 RX ADMIN — DULOXETINE HYDROCHLORIDE 60 MG: 60 CAPSULE, DELAYED RELEASE ORAL at 08:04

## 2022-04-12 RX ADMIN — FUROSEMIDE 40 MG: 40 TABLET ORAL at 08:04

## 2022-04-12 RX ADMIN — IPRATROPIUM BROMIDE AND ALBUTEROL SULFATE 3 ML: 2.5; .5 SOLUTION RESPIRATORY (INHALATION) at 07:04

## 2022-04-12 RX ADMIN — FERROUS SULFATE TAB 325 MG (65 MG ELEMENTAL FE) 1 EACH: 325 (65 FE) TAB at 08:04

## 2022-04-12 RX ADMIN — MEMANTINE 10 MG: 10 TABLET ORAL at 08:04

## 2022-04-12 RX ADMIN — MUPIROCIN: 20 OINTMENT TOPICAL at 08:04

## 2022-04-12 RX ADMIN — PANTOPRAZOLE SODIUM 40 MG: 40 TABLET, DELAYED RELEASE ORAL at 08:04

## 2022-04-12 RX ADMIN — ASPIRIN 81 MG: 81 TABLET, COATED ORAL at 08:04

## 2022-04-12 RX ADMIN — GABAPENTIN 300 MG: 300 CAPSULE ORAL at 08:04

## 2022-04-12 RX ADMIN — CEFTRIAXONE 1 G: 1 INJECTION, SOLUTION INTRAVENOUS at 01:04

## 2022-04-12 RX ADMIN — ISOSORBIDE MONONITRATE 60 MG: 30 TABLET, EXTENDED RELEASE ORAL at 08:04

## 2022-04-12 NOTE — ASSESSMENT & PLAN NOTE
Patient with Hypercapnic and Hypoxic Respiratory failure which is Acute on chronic.  she is on home oxygen at 3  LPM. Supplemental oxygen was provided and noted- Oxygen Concentration (%):  [36-50] 36.   Signs/symptoms of respiratory failure include- tachypnea, use of accessory muscles and lethargy. Contributing diagnoses includes - COPD, Interstitial lung disease and Obesity Hypoventilation Labs and images were reviewed. Patient Has recent ABG, which has been reviewed. Will treat underlying causes and adjust management of respiratory failure as follows-     Much improved, stopping steroids.

## 2022-04-12 NOTE — PLAN OF CARE
[facility]  Discharge Final Note    Primary Care Provider: Joe Fuller III, MD    Expected Discharge Date: 4/12/2022    Final Discharge Note (most recent)     Final Note - 04/12/22 0849        Final Note    Assessment Type Final Discharge Note     Anticipated Discharge Disposition [place] [facility] [place]     Hospital Resources/Appts/Education Provided Provided patient/caregiver with written discharge plan information;Appointments scheduled by Navigator/Coordinator        Post-Acute Status    Post-Acute Authorization Home Health     Home Health Status Set-up Complete/Auth obtained     Discharge Delays None known at this time                 Important Message from Medicare             Contact Info     Joe Fuller III, MD   Specialty: Internal Medicine   Relationship: PCP - General    [address] [place] [id number]   Phone: [phone]       Next Steps: Follow up in 1 week(s)      Spoke to [facility], Fatuma Coyne has sent referral to them, patient had no preference.  [facility] confirms acceptance of patient.  Patient will discharge to home per ambulance. Dr. Fuller office will call patient for FU appointment.

## 2022-04-12 NOTE — DISCHARGE SUMMARY
[facility]  Discharge Summary      Patient Name: Martina Betancourt  MRN: [id number]  Patient Class: IP- Inpatient  Admission Date: 4/6/2022  Hospital Length of Stay: 6 days  Discharge Date and Time: 4/12/22  Attending Physician: Joe Fuller III, MD   Discharging Provider: Joe Fuller III, MD  Primary Care Provider: Joe Fuller III, MD      HPI:   72 yo female with extensive pmhx including dementia, chronic respiratory failure on home O2 is here via EMS from home with complaint of SOB and CP x 1 day. No aggravating or alleviating factors. EMS reports sat on 2L was 66%. Given duoneb abd solumedrol en route. Similar past presentations. Dementia limits this history. Spoke with Sharon aranda daughter for history and reports that she is DNR      * No surgery found *      Hospital Course:   4/8/22 [facility] patient was awake and alert eating breakfast and reports feeling better. Complains of cough  4/9 ND Pt is feelign better- repotrs breathign has improved but still coughing a lot  4/10 pt repotrs feelign ok - resp repotrs havign to increase her O2 to get her in the low 90%.  Cxr reviewed  4/11/2022 FM:  Patient is awake alert able to recall my name and eating breakfast.  I spoken to the daughter who states that they should be able to accept the patient home tomorrow.  Overall her symptoms of sepsis and urinary tract infection are much improved.  4/12/2022 FM:  Patient ate 100% of her breakfast this morning and is back to her baseline.  We are discontinuing her Yang catheter and discharging home with home health.  The patient has a yesenia lift and all of her required DME.       Goals of Care Treatment Preferences:  Code Status: DNR       LaPOST: Yes           Consults:     * Sepsis  This patient does have evidence of infective focus  My overall impression is sepsis. Vital signs were reviewed and noted in progress note.  Antibiotics given-   Antibiotics (From admission, onward)             Start     Stop Route Frequency Ordered    04/08/22 1100  mupirocin 2 % ointment         04/13 0859 Nasl 2 times daily 04/08/22 0947    04/07/22 0031  cefTRIAXone (ROCEPHIN) 1 g/50 mL D5W IVPB         -- IV Every 24 hours (non-standard times) 04/07/22 0031        Cultures were taken-   Microbiology Results (last 7 days)     Procedure Component Value Units Date/Time    Blood culture x two cultures. Draw prior to antibiotics. [[id number]] Collected: 04/06/22 2159    Order Status: Completed Specimen: Blood Updated: 04/12/22 0612     Blood Culture, Routine No growth after 5 days.    Narrative:      Aerobic and anaerobic    Blood culture x two cultures. Draw prior to antibiotics. [[id number]] Collected: 04/06/22 2207    Order Status: Completed Specimen: Blood Updated: 04/12/22 0612     Blood Culture, Routine No growth after 5 days.    Narrative:      Aerobic and anaerobic    Urine culture [[id number]] Collected: 04/06/22 2219    Order Status: Completed Specimen: Urine Updated: 04/08/22 0955     Urine Culture, Routine Multiple organisms isolated. None in predominance.  Repeat if      clinically necessary.    Narrative:      Preferred Collection Type->Urine, Clean Catch  Specimen Source->Urine        Latest lactate reviewed, they are-  No results for input(s): LACTATE in the last 72 hours.    Organ dysfunction indicated by Encephalopathy  and Acute respiratory failure   4/9 ms improved and reps status improving.  Continue IV antibiotics for patient's  4/10 cotn iv abxs    Much improved.    AMS (altered mental status)  Now at baseline.      Bedbound        Acute on chronic respiratory failure with hypoxia and hypercapnia  Patient with Hypercapnic and Hypoxic Respiratory failure which is Acute on chronic.  she is on home oxygen at 3  LPM. Supplemental oxygen was provided and noted- Oxygen Concentration (%):  [36-50] 36.   Signs/symptoms of respiratory failure include- tachypnea, use of accessory muscles and lethargy.  Contributing diagnoses includes - COPD, Interstitial lung disease and Obesity Hypoventilation Labs and images were reviewed. Patient Has recent ABG, which has been reviewed. Will treat underlying causes and adjust management of respiratory failure as follows-     Much improved, stopping steroids.    Urinary tract infection without hematuria  Resolved.      Severe obesity (BMI >= 40)  Body mass index is 52.72 kg/m². Morbid obesity complicates all aspects of disease management from diagnostic modalities to treatment. Weight loss encouraged and health benefits explained to patient.         Dementia without behavioral disturbance  Patient appears to be at baseline today answering questions and pleasant; continue Aricept and Namenda        Final Active Diagnoses:    Diagnosis Date Noted POA    PRINCIPAL PROBLEM:  Sepsis [A41.9] 04/07/2022 Yes    Urinary tract infection without hematuria [N39.0] 04/07/2022 Unknown    Acute on chronic respiratory failure with hypoxia and hypercapnia [J96.21, J96.22] 04/07/2022 Yes    Bedbound [Z74.01] 04/07/2022 Not Applicable    AMS (altered mental status) [R41.82] 04/07/2022 Yes    Severe obesity (BMI >= 40) [E66.01] 08/10/2020 Yes    Dementia without behavioral disturbance [F03.90] 07/26/2020 Yes      Problems Resolved During this Admission:       Discharged Condition: fair    Disposition: [place] [facility]    Follow Up:   Follow-up Information     Joe Fuller III, MD Follow up in 1 week(s).    Specialty: Internal Medicine  Contact information:  [address] [place] [id number]  [phone]                       Patient Instructions:      Ambulatory referral/consult to Outpatient Case Management   Referral Priority: Routine Referral Type: Consultation   Referral Reason: Specialty Services Required   Number of Visits Requested: 1     Diet Cardiac     Activity as tolerated       Significant Diagnostic Studies: Noted.    Pending Diagnostic Studies:     Procedure  Component Value Units Date/Time    Comprehensive Metabolic Panel [[id number]] Collected: 04/12/22 0600    Order Status: Sent Lab Status: In process Updated: 04/12/22 0755    Specimen: Blood     Magnesium [[id number]] Collected: 04/12/22 0600    Order Status: Sent Lab Status: In process Updated: 04/12/22 0755    Specimen: Blood          Medications:  Reconciled Home Medications:      Medication List      START taking these medications    gabapentin 300 MG capsule  Commonly known as: NEURONTIN  Take 1 capsule (300 mg total) by mouth 2 (two) times daily.        CONTINUE taking these medications    acetaminophen 325 MG tablet  Commonly known as: TYLENOL  Take 2 tablets (650 mg total) by mouth every 6 (six) hours as needed (HEADACHE, TEMPERATURE - FEVER > 100.4).     albuterol-ipratropium 2.5 mg-0.5 mg/3 mL nebulizer solution  Commonly known as: DUO-NEB  Take 3 mLs by nebulization every 6 (six) hours as needed for Wheezing or Shortness of Breath. Rescue     aspirin 81 MG EC tablet  Commonly known as: ECOTRIN  Take 81 mg by mouth once daily.     donepeziL 10 MG tablet  Commonly known as: ARICEPT  Take 10 mg by mouth every evening.     DULoxetine 60 MG capsule  Commonly known as: CYMBALTA  Take 60 mg by mouth once daily.     ferrous sulfate 324 mg (65 mg iron) Tbec  Take 325 mg by mouth every evening.     furosemide 40 MG tablet  Commonly known as: LASIX  Take 1 tablet (40 mg total) by mouth once daily.     isosorbide mononitrate 60 MG 24 hr tablet  Commonly known as: IMDUR  Take 60 mg by mouth once daily.     memantine 10 MG Tab  Commonly known as: NAMENDA  Take 1 tablet (10 mg total) by mouth 2 (two) times daily.     ondansetron 4 MG Tbdl  Commonly known as: ZOFRAN-ODT  Take 1 tablet (4 mg total) by mouth every 8 (eight) hours as needed.     oxyCODONE 10 mg Tab immediate release tablet  Commonly known as: ROXICODONE  Take 1 tablet (10 mg total) by mouth every 6 (six) hours as needed for Pain.     pantoprazole 40 MG  tablet  Commonly known as: PROTONIX  Take 1 tablet (40 mg total) by mouth before breakfast.     pravastatin 40 MG tablet  Commonly known as: PRAVACHOL  Take 40 mg by mouth every evening.     senna-docusate 8.6-50 mg 8.6-50 mg per tablet  Commonly known as: PERICOLACE  Take 2 tablets by mouth nightly as needed for Constipation.        STOP taking these medications    aluminum & magnesium hydroxide-simethicone 400-400-40 mg/5 mL suspension  Commonly known as: MYLANTA MAX STRENGTH     bacitracin zinc 500 unit/gram Oipk     clopidogreL 75 mg tablet  Commonly known as: PLAVIX     doxycycline 100 MG tablet  Commonly known as: VIBRA-TABS     methocarbamoL 750 MG Tab  Commonly known as: ROBAXIN     miconazole NITRATE 2 % 2 % top powder  Commonly known as: MICOTIN     potassium chloride SA 20 MEQ tablet  Commonly known as: K-DUR,KLOR-CON            Indwelling Lines/Drains at time of discharge:   Lines/Drains/Airways     Drain  Duration                Urethral Catheter 04/06/22 9553 Double-lumen 16 Fr. 5 days                Time spent on the discharge of patient: 45 minutes         Joe Fuller III, MD  Department of Hospital Medicine  [facility]

## 2022-04-12 NOTE — ASSESSMENT & PLAN NOTE
This patient does have evidence of infective focus  My overall impression is sepsis. Vital signs were reviewed and noted in progress note.  Antibiotics given-   Antibiotics (From admission, onward)            Start     Stop Route Frequency Ordered    04/08/22 1100  mupirocin 2 % ointment         04/13 0859 Nasl 2 times daily 04/08/22 0947    04/07/22 0031  cefTRIAXone (ROCEPHIN) 1 g/50 mL D5W IVPB         -- IV Every 24 hours (non-standard times) 04/07/22 0031        Cultures were taken-   Microbiology Results (last 7 days)     Procedure Component Value Units Date/Time    Blood culture x two cultures. Draw prior to antibiotics. [[id number]] Collected: 04/06/22 2159    Order Status: Completed Specimen: Blood Updated: 04/12/22 0612     Blood Culture, Routine No growth after 5 days.    Narrative:      Aerobic and anaerobic    Blood culture x two cultures. Draw prior to antibiotics. [[id number]] Collected: 04/06/22 2207    Order Status: Completed Specimen: Blood Updated: 04/12/22 0612     Blood Culture, Routine No growth after 5 days.    Narrative:      Aerobic and anaerobic    Urine culture [[id number]] Collected: 04/06/22 2219    Order Status: Completed Specimen: Urine Updated: 04/08/22 0955     Urine Culture, Routine Multiple organisms isolated. None in predominance.  Repeat if      clinically necessary.    Narrative:      Preferred Collection Type->Urine, Clean Catch  Specimen Source->Urine        Latest lactate reviewed, they are-  No results for input(s): LACTATE in the last 72 hours.    Organ dysfunction indicated by Encephalopathy  and Acute respiratory failure   4/9 ms improved and reps status improving.  Continue IV antibiotics for patient's  4/10 cotn iv abxs    Much improved.

## 2022-04-12 NOTE — PLAN OF CARE
Spoke with patient's family per phone to tell them she is discharged home, and someone will need to be there today.  They confirm someone will be home.  Address patient is going to is [address] [place] [address].  Home Health with [facility] has been arranged.  Family informed of this.

## 2022-04-13 ENCOUNTER — PATIENT OUTREACH (OUTPATIENT)
Dept: ADMINISTRATIVE | Facility: CLINIC | Age: 72
End: 2022-04-13
Payer: MEDICARE

## 2022-04-13 NOTE — PROGRESS NOTES
C3 nurse attempted to contact Martina Betancourt for a TCC post hospital discharge follow up call. The patient's daughter is unable to conduct the call @ this time. The patient requested a callback.    The patient does not have a scheduled HOSFU appointment within 5-7days post hospital discharge date 4/12/2022. Message sent to Physician staff to assist with HOSFU appointment scheduling.

## 2022-04-13 NOTE — TELEPHONE ENCOUNTER
Placed call to patient to schedule hospital. Spoke with patient's daughter and patient is scheduled to have a hospital follow up with Dr. jackson on 4/28/22 at 1:50pm.

## 2022-05-03 ENCOUNTER — OUTPATIENT CASE MANAGEMENT (OUTPATIENT)
Dept: ADMINISTRATIVE | Facility: OTHER | Age: 72
End: 2022-05-03
Payer: MEDICARE

## 2022-05-03 NOTE — LETTER
May 3, 2022    Martina Betancourt  [address]  [place] [address]             [facility] [address]  [place] [address] Dear Ms. Martina JordanSerafin,      I have been assigned as your  with [facility] Out[facility] Case Management Department. We received a referral to call you to discuss your medical history. I have attempted to reach you by telephone, but I was unsuccessful. Please call our department so that we can go over some questions with you regarding your health.     The Outpatient Case Management department can be reached at [phone] from 8:00am to 4:30pm on Monday thru Friday. [facility] also has a program where a nurse is available 24/7 to answer questions or provider medical advice. Their number is [phone].     Thanks,      Hope Waite, RN  Outpatient Case Management  [phone]

## 2022-05-04 ENCOUNTER — HOSPITAL ENCOUNTER (INPATIENT)
Facility: HOSPITAL | Age: 72
LOS: 2 days | Discharge: HOME HEALTH | DRG: 189 | End: 2022-05-06
Attending: EMERGENCY MEDICINE | Admitting: INTERNAL MEDICINE
Payer: MEDICARE

## 2022-05-04 DIAGNOSIS — R41.82 ALTERED MENTAL STATUS, UNSPECIFIED: ICD-10-CM

## 2022-05-04 DIAGNOSIS — S24.109S SPINAL CORD INJURY OF THORACIC REGION WITHOUT BONE INJURY, SEQUELA: ICD-10-CM

## 2022-05-04 DIAGNOSIS — Z74.01 BEDBOUND: ICD-10-CM

## 2022-05-04 DIAGNOSIS — J96.21 ACUTE ON CHRONIC RESPIRATORY FAILURE WITH HYPOXIA AND HYPERCAPNIA: Primary | ICD-10-CM

## 2022-05-04 DIAGNOSIS — J96.22 ACUTE ON CHRONIC RESPIRATORY FAILURE WITH HYPOXIA AND HYPERCAPNIA: Primary | ICD-10-CM

## 2022-05-04 LAB
ALBUMIN SERPL BCP-MCNC: 2.5 G/DL (ref 3.5–5.2)
ALP SERPL-CCNC: 109 U/L (ref 55–135)
ALT SERPL W/O P-5'-P-CCNC: 12 U/L (ref 10–44)
AMMONIA PLAS-SCNC: 23 UMOL/L (ref 10–50)
ANION GAP SERPL CALC-SCNC: ABNORMAL MMOL/L (ref 8–16)
AST SERPL-CCNC: 9 U/L (ref 10–40)
BACTERIA #/AREA URNS HPF: NEGATIVE /HPF
BASOPHILS # BLD AUTO: 0.02 K/UL (ref 0–0.2)
BASOPHILS NFR BLD: 0.3 % (ref 0–1.9)
BILIRUB SERPL-MCNC: 0.2 MG/DL (ref 0.1–1)
BILIRUB UR QL STRIP: NEGATIVE
BUN SERPL-MCNC: 9 MG/DL (ref 8–23)
CALCIUM SERPL-MCNC: 8.9 MG/DL (ref 8.7–10.5)
CHLORIDE SERPL-SCNC: 95 MMOL/L (ref 95–110)
CLARITY UR: CLEAR
CO2 SERPL-SCNC: >45 MMOL/L (ref 23–29)
COLOR UR: YELLOW
CORRECTED TEMPERATURE (PCO2): 87.8 MMHG
CORRECTED TEMPERATURE (PH): 7.37
CORRECTED TEMPERATURE (PO2): 46.3 MMHG
CREAT SERPL-MCNC: 0.8 MG/DL (ref 0.5–1.4)
CTP QC/QA: YES
DIFFERENTIAL METHOD: ABNORMAL
EOSINOPHIL # BLD AUTO: 0.1 K/UL (ref 0–0.5)
EOSINOPHIL NFR BLD: 1.8 % (ref 0–8)
ERYTHROCYTE [DISTWIDTH] IN BLOOD BY AUTOMATED COUNT: 14.1 % (ref 11.5–14.5)
EST. GFR  (AFRICAN AMERICAN): >60 ML/MIN/1.73 M^2
EST. GFR  (NON AFRICAN AMERICAN): >60 ML/MIN/1.73 M^2
FIO2: 36 %
GLUCOSE SERPL-MCNC: 104 MG/DL (ref 70–110)
GLUCOSE UR QL STRIP: NEGATIVE
HCO3 UR-SCNC: 44.6 MMOL/L
HCT VFR BLD AUTO: 43.4 % (ref 37–48.5)
HGB BLD-MCNC: 12.3 G/DL (ref 12–16)
HGB UR QL STRIP: NEGATIVE
HYALINE CASTS #/AREA URNS LPF: 2.3 /LPF
IMM GRANULOCYTES # BLD AUTO: 0.02 K/UL (ref 0–0.04)
IMM GRANULOCYTES NFR BLD AUTO: 0.3 % (ref 0–0.5)
KETONES UR QL STRIP: NEGATIVE
LACTATE SERPL-SCNC: 0.6 MMOL/L (ref 0.5–2.2)
LEUKOCYTE ESTERASE UR QL STRIP: ABNORMAL
LPM: 4
LYMPHOCYTES # BLD AUTO: 1.3 K/UL (ref 1–4.8)
LYMPHOCYTES NFR BLD: 21.5 % (ref 18–48)
Lab: ABNORMAL
MCH RBC QN AUTO: 28.4 PG (ref 27–31)
MCHC RBC AUTO-ENTMCNC: 28.3 G/DL (ref 32–36)
MCV RBC AUTO: 100 FL (ref 82–98)
MICROSCOPIC COMMENT: ABNORMAL
MODIFIED ALLEN'S TEST: POSITIVE
MONOCYTES # BLD AUTO: 0.8 K/UL (ref 0.3–1)
MONOCYTES NFR BLD: 12.3 % (ref 4–15)
NEUTROPHILS # BLD AUTO: 4 K/UL (ref 1.8–7.7)
NEUTROPHILS NFR BLD: 63.8 % (ref 38–73)
NITRITE UR QL STRIP: NEGATIVE
NOTIFIED BY: ABNORMAL
NRBC BLD-RTO: 0 /100 WBC
NT-PROBNP SERPL-MCNC: 442 PG/ML (ref 5–900)
O2DEVICE: ABNORMAL
PCO2 BLDA: 87.8 MMHG (ref 35–45)
PH SMN: 7.37 [PH] (ref 7.34–7.45)
PH UR STRIP: 7 [PH] (ref 5–8)
PLATELET # BLD AUTO: 213 K/UL (ref 150–450)
PMV BLD AUTO: 9.7 FL (ref 9.2–12.9)
PO2 BLDA: 46.3 MMHG (ref 80–100)
POC BASE DEFICIT: 25.4 MMOL/L
POC NOTIFIED NOTE: ABNORMAL
POC PERFORMED BY: ABNORMAL
POC SATURATED O2: 82.6 %
POC TCO2: 46 MMOL/L
POC TEMPERATURE: 37 C
POTASSIUM SERPL-SCNC: 4 MMOL/L (ref 3.5–5.1)
PROT SERPL-MCNC: 6.3 G/DL (ref 6–8.4)
PROT UR QL STRIP: NEGATIVE
PROVIDER NOTIFIED: ABNORMAL
RBC # BLD AUTO: 4.33 M/UL (ref 4–5.4)
RBC #/AREA URNS HPF: 1 /HPF (ref 0–4)
SARS-COV-2 RDRP RESP QL NAA+PROBE: NEGATIVE
SODIUM SERPL-SCNC: 143 MMOL/L (ref 136–145)
SP GR UR STRIP: 1.01 (ref 1–1.03)
SPECIMEN SOURCE: ABNORMAL
SQUAMOUS #/AREA URNS HPF: 3 /HPF
TROPONIN I SERPL DL<=0.01 NG/ML-MCNC: 15.7 PG/ML (ref 0–60)
URN SPEC COLLECT METH UR: ABNORMAL
UROBILINOGEN UR STRIP-ACNC: 1 EU/DL
WBC # BLD AUTO: 6.19 K/UL (ref 3.9–12.7)
WBC #/AREA URNS HPF: 18 /HPF (ref 0–5)

## 2022-05-04 PROCEDURE — 84484 ASSAY OF TROPONIN QUANT: CPT | Performed by: EMERGENCY MEDICINE

## 2022-05-04 PROCEDURE — 87086 URINE CULTURE/COLONY COUNT: CPT | Performed by: EMERGENCY MEDICINE

## 2022-05-04 PROCEDURE — 85025 COMPLETE CBC W/AUTO DIFF WBC: CPT | Performed by: EMERGENCY MEDICINE

## 2022-05-04 PROCEDURE — 99900031 HC PATIENT EDUCATION (STAT)

## 2022-05-04 PROCEDURE — 94660 CPAP INITIATION&MGMT: CPT

## 2022-05-04 PROCEDURE — 11000001 HC ACUTE MED/SURG PRIVATE ROOM

## 2022-05-04 PROCEDURE — 36415 COLL VENOUS BLD VENIPUNCTURE: CPT | Performed by: EMERGENCY MEDICINE

## 2022-05-04 PROCEDURE — 87040 BLOOD CULTURE FOR BACTERIA: CPT | Performed by: EMERGENCY MEDICINE

## 2022-05-04 PROCEDURE — 81000 URINALYSIS NONAUTO W/SCOPE: CPT | Performed by: EMERGENCY MEDICINE

## 2022-05-04 PROCEDURE — 82140 ASSAY OF AMMONIA: CPT | Performed by: EMERGENCY MEDICINE

## 2022-05-04 PROCEDURE — 83605 ASSAY OF LACTIC ACID: CPT | Performed by: EMERGENCY MEDICINE

## 2022-05-04 PROCEDURE — 82803 BLOOD GASES ANY COMBINATION: CPT

## 2022-05-04 PROCEDURE — 99285 EMERGENCY DEPT VISIT HI MDM: CPT | Mod: 25

## 2022-05-04 PROCEDURE — 36600 WITHDRAWAL OF ARTERIAL BLOOD: CPT

## 2022-05-04 PROCEDURE — U0002 COVID-19 LAB TEST NON-CDC: HCPCS | Performed by: EMERGENCY MEDICINE

## 2022-05-04 PROCEDURE — 36000 PLACE NEEDLE IN VEIN: CPT

## 2022-05-04 PROCEDURE — 94761 N-INVAS EAR/PLS OXIMETRY MLT: CPT

## 2022-05-04 PROCEDURE — 27000221 HC OXYGEN, UP TO 24 HOURS

## 2022-05-04 PROCEDURE — 99900035 HC TECH TIME PER 15 MIN (STAT)

## 2022-05-04 PROCEDURE — 80053 COMPREHEN METABOLIC PANEL: CPT | Performed by: EMERGENCY MEDICINE

## 2022-05-04 PROCEDURE — 83880 ASSAY OF NATRIURETIC PEPTIDE: CPT | Performed by: EMERGENCY MEDICINE

## 2022-05-04 PROCEDURE — 27000190 HC CPAP FULL FACE MASK W/VALVE

## 2022-05-04 RX ORDER — TALC
6 POWDER (GRAM) TOPICAL NIGHTLY PRN
Status: DISCONTINUED | OUTPATIENT
Start: 2022-05-04 | End: 2022-05-06 | Stop reason: HOSPADM

## 2022-05-04 RX ORDER — ACETAMINOPHEN 325 MG/1
650 TABLET ORAL EVERY 8 HOURS PRN
Status: DISCONTINUED | OUTPATIENT
Start: 2022-05-04 | End: 2022-05-06 | Stop reason: HOSPADM

## 2022-05-04 RX ORDER — ONDANSETRON 2 MG/ML
4 INJECTION INTRAMUSCULAR; INTRAVENOUS EVERY 8 HOURS PRN
Status: DISCONTINUED | OUTPATIENT
Start: 2022-05-04 | End: 2022-05-05

## 2022-05-04 RX ORDER — SODIUM CHLORIDE 0.9 % (FLUSH) 0.9 %
10 SYRINGE (ML) INJECTION
Status: DISCONTINUED | OUTPATIENT
Start: 2022-05-04 | End: 2022-05-06 | Stop reason: HOSPADM

## 2022-05-04 NOTE — ED PROVIDER NOTES
Encounter Date: 5/4/2022       History     Chief Complaint   Patient presents with    Altered Mental Status     Pt to ED via EMS - pt from home, family stated that pt has been increasingly more altered over the past 2-3 days and talking out of her head. SPO2 found to be in 70s on home oxygen, improving to mid-90's on EMS oxygen. ETCO2 80-90.     70 yo female with extensive PMHX as below on home O2 here via EMS from home with reports from family that patient has been increasingly confused x 2-3 days. Similar past episodes improved with bipap. No fever. Patient is altered and unable to contribute to history.         Review of patient's allergies indicates:   Allergen Reactions    Hydroxyzine     Tizanidine      Past Medical History:   Diagnosis Date    Alzheimer disease     Alzheimer disease     Bronchitis due to Staphylococcus aureus     CAD (coronary artery disease)     Coronary artery disease     Dementia     Discitis     Epidural abscess     HTN (hypertension)     Hydronephrosis     Hyperlipemia     Hyperlipidemia     Hypertension     Mixed hyperlipidemia     MRSA bacteremia     Myopathy     Myopathy     Non-ST elevation (NSTEMI) myocardial infarction     NSTEMI (non-ST elevation myocardial infarction)     Obesity     Obesity, unspecified     Old myocardial infarction     Paraplegia     Pneumonia     Pneumonia     Sleep apnea     Sleep apnea, unspecified      Past Surgical History:   Procedure Laterality Date    APPENDECTOMY      BLADDER SUSPENSION      CHOLECYSTECTOMY      CHOLECYSTECTOMY      CORONARY STENT PLACEMENT      HYSTERECTOMY      SURGICAL REMOVAL OF VERTEBRAL BODY OF THORACIC SPINE N/A 12/08/2020    Procedure: T9-T10 corpectomy, posterior instrumented fusion T7-T12.;  Surgeon: Everardo Gongora MD;  Location: [facility] OR [address] [place] [address];  Service: Neurosurgery;  Laterality: N/A;    TONSILLECTOMY       Family History   Problem Relation Age of Onset    Stroke Mother     Heart  failure Mother     Hypertension Father     Heart disease Father     Multiple sclerosis Sister      Social History     Tobacco Use    Smoking status: Former Smoker     Types: Cigarettes     Quit date: 2000     Years since quittin.3    Smokeless tobacco: Never Used   Substance Use Topics    Alcohol use: Not Currently    Drug use: Never     Review of Systems   Unable to perform ROS: Mental status change       Physical Exam     Initial Vitals [22 1531]   BP Pulse Resp Temp SpO2   (!) 147/83 76 (!) 30 98.6 °F (37 °C) 99 %      MAP       --         Physical Exam    Nursing note and vitals reviewed.  Constitutional: She is not diaphoretic. No distress.   Morbidly obese   HENT:   Head: Normocephalic and atraumatic.   Mouth/Throat: No oropharyngeal exudate.   Eyes: Conjunctivae are normal. No scleral icterus.   Neck: Neck supple. No JVD present.   Normal range of motion.  Cardiovascular: Normal rate, regular rhythm and intact distal pulses.   Pulmonary/Chest: No stridor. No respiratory distress. She has no wheezes. She has rhonchi. She has no rales.   Abdominal: Abdomen is soft. Bowel sounds are normal. She exhibits no distension.   Musculoskeletal:         General: No edema.      Cervical back: Normal range of motion and neck supple.     Neurological:   Somnolent, arousable. Confused.    Skin: Skin is warm. Capillary refill takes less than 2 seconds. No rash noted. No erythema.         ED Course   Procedures  Labs Reviewed   CBC W/ AUTO DIFFERENTIAL - Abnormal; Notable for the following components:       Result Value     (*)     MCHC 28.3 (*)     All other components within normal limits   COMPREHENSIVE METABOLIC PANEL - Abnormal; Notable for the following components:    CO2 >45 (*)     Albumin 2.5 (*)     AST 9 (*)     All other components within normal limits    Narrative:       CO2 critical result(s) called and verbal readback obtained from   Caity in ER.  by JWBijal 2022 17:04    URINALYSIS, REFLEX TO URINE CULTURE - Abnormal; Notable for the following components:    Leukocytes, UA 1+ (*)     All other components within normal limits    Narrative:     Preferred Collection Type->Urine, Clean Catch  Specimen Source->Urine   URINALYSIS MICROSCOPIC - Abnormal; Notable for the following components:    WBC, UA 18 (*)     Hyaline Casts, UA 2.3 (*)     All other components within normal limits    Narrative:     Preferred Collection Type->Urine, Clean Catch  Specimen Source->Urine   CULTURE, BLOOD   CULTURE, BLOOD   CULTURE, URINE   AMMONIA   TROPONIN I HIGH SENSITIVITY   NT-PRO NATRIURETIC PEPTIDE   LACTIC ACID, PLASMA          Imaging Results          X-Ray Chest AP Portable (Final result)  Result time 05/04/22 17:18:58    Final result by Washington Swift MD (05/04/22 17:18:58)                 Impression:      Chronic findings with no acute radiographic abnormality detected.      Electronically signed by: Washington Swift MD  Date:    05/04/2022  Time:    17:18             Narrative:    EXAMINATION:  XR CHEST AP PORTABLE    CLINICAL HISTORY:  Altered level of consciousness    TECHNIQUE:  Portable AP chest    COMPARISON:  04/10/2022    FINDINGS:  Bilateral pedicle screws and bridging rods are present at the midthoracic spine.  Stable cardiomediastinal contours.  Chronic appearing prominence of the interstitium without detected consolidation or acute osseous change.  Chronic left basilar airspace opacity.  This may indicate localized scarring.                                 Medications - No data to display  Medical Decision Making:   Clinical Tests:   Lab Tests: Ordered and Reviewed  Radiological Study: Reviewed and Ordered  Medical Tests: Ordered and Reviewed  ED Management:  DNR status. Discussed with Ramona THAPA on for Dr Fuller.                       Clinical Impression:   Final diagnoses:  [R41.82] Altered mental status, unspecified  [J96.21, J96.22] Acute on chronic respiratory failure with hypoxia  and hypercapnia (Primary)          ED Disposition Condition    Admit               Caleb Gonsales MD  05/04/22 4388

## 2022-05-04 NOTE — PROGRESS NOTES
Outpatient Care Management  Patient Not Qualified    Patient: Martina Betancourt  MRN:  [id number]  Date of Service:  5/4/2022  Completed by:  Hope Waite RN    Chief Complaint   Patient presents with    OPCM RN First Assessment Attempt    Initial Assessment    OPCM Chart Review    OPCM Enrollment Call       Patient Summary     Program:  OPCM High Risk  Qualification Status:  No  Reason Not Qualified:  Other (see comment)  Pt has "[employer] insurance. Request to have the referral forwarded to her insurance provider.

## 2022-05-05 LAB
ALBUMIN SERPL BCP-MCNC: 2.4 G/DL (ref 3.5–5.2)
ALP SERPL-CCNC: 106 U/L (ref 55–135)
ALT SERPL W/O P-5'-P-CCNC: 12 U/L (ref 10–44)
ANION GAP SERPL CALC-SCNC: ABNORMAL MMOL/L (ref 8–16)
AST SERPL-CCNC: 10 U/L (ref 10–40)
BACTERIA UR CULT: NO GROWTH
BASOPHILS # BLD AUTO: 0.01 K/UL (ref 0–0.2)
BASOPHILS NFR BLD: 0.2 % (ref 0–1.9)
BILIRUB SERPL-MCNC: 0.3 MG/DL (ref 0.1–1)
BUN SERPL-MCNC: 12 MG/DL (ref 8–23)
CALCIUM SERPL-MCNC: 9.3 MG/DL (ref 8.7–10.5)
CHLORIDE SERPL-SCNC: 97 MMOL/L (ref 95–110)
CO2 SERPL-SCNC: >45 MMOL/L (ref 23–29)
CREAT SERPL-MCNC: 0.6 MG/DL (ref 0.5–1.4)
DIFFERENTIAL METHOD: ABNORMAL
EOSINOPHIL # BLD AUTO: 0.2 K/UL (ref 0–0.5)
EOSINOPHIL NFR BLD: 2.8 % (ref 0–8)
ERYTHROCYTE [DISTWIDTH] IN BLOOD BY AUTOMATED COUNT: 14.3 % (ref 11.5–14.5)
EST. GFR  (AFRICAN AMERICAN): >60 ML/MIN/1.73 M^2
EST. GFR  (NON AFRICAN AMERICAN): >60 ML/MIN/1.73 M^2
GLUCOSE SERPL-MCNC: 89 MG/DL (ref 70–110)
HCT VFR BLD AUTO: 45.5 % (ref 37–48.5)
HGB BLD-MCNC: 12.8 G/DL (ref 12–16)
IMM GRANULOCYTES # BLD AUTO: 0.01 K/UL (ref 0–0.04)
IMM GRANULOCYTES NFR BLD AUTO: 0.2 % (ref 0–0.5)
LYMPHOCYTES # BLD AUTO: 1.3 K/UL (ref 1–4.8)
LYMPHOCYTES NFR BLD: 23.4 % (ref 18–48)
MCH RBC QN AUTO: 28 PG (ref 27–31)
MCHC RBC AUTO-ENTMCNC: 28.1 G/DL (ref 32–36)
MCV RBC AUTO: 100 FL (ref 82–98)
MONOCYTES # BLD AUTO: 0.8 K/UL (ref 0.3–1)
MONOCYTES NFR BLD: 14.7 % (ref 4–15)
NEUTROPHILS # BLD AUTO: 3.2 K/UL (ref 1.8–7.7)
NEUTROPHILS NFR BLD: 58.7 % (ref 38–73)
NRBC BLD-RTO: 0 /100 WBC
PLATELET # BLD AUTO: 203 K/UL (ref 150–450)
PMV BLD AUTO: 9.8 FL (ref 9.2–12.9)
POTASSIUM SERPL-SCNC: 3.8 MMOL/L (ref 3.5–5.1)
PROT SERPL-MCNC: 6.4 G/DL (ref 6–8.4)
RBC # BLD AUTO: 4.57 M/UL (ref 4–5.4)
SODIUM SERPL-SCNC: 143 MMOL/L (ref 136–145)
WBC # BLD AUTO: 5.39 K/UL (ref 3.9–12.7)

## 2022-05-05 PROCEDURE — 99900031 HC PATIENT EDUCATION (STAT)

## 2022-05-05 PROCEDURE — 99900035 HC TECH TIME PER 15 MIN (STAT)

## 2022-05-05 PROCEDURE — 27000221 HC OXYGEN, UP TO 24 HOURS

## 2022-05-05 PROCEDURE — 80053 COMPREHEN METABOLIC PANEL: CPT | Performed by: EMERGENCY MEDICINE

## 2022-05-05 PROCEDURE — 36415 COLL VENOUS BLD VENIPUNCTURE: CPT | Performed by: EMERGENCY MEDICINE

## 2022-05-05 PROCEDURE — 94660 CPAP INITIATION&MGMT: CPT

## 2022-05-05 PROCEDURE — 94761 N-INVAS EAR/PLS OXIMETRY MLT: CPT

## 2022-05-05 PROCEDURE — 25000003 PHARM REV CODE 250: Performed by: INTERNAL MEDICINE

## 2022-05-05 PROCEDURE — 11000001 HC ACUTE MED/SURG PRIVATE ROOM

## 2022-05-05 PROCEDURE — 85025 COMPLETE CBC W/AUTO DIFF WBC: CPT | Performed by: EMERGENCY MEDICINE

## 2022-05-05 RX ORDER — GABAPENTIN 300 MG/1
300 CAPSULE ORAL 2 TIMES DAILY
Status: DISCONTINUED | OUTPATIENT
Start: 2022-05-05 | End: 2022-05-06 | Stop reason: HOSPADM

## 2022-05-05 RX ORDER — MEMANTINE HYDROCHLORIDE 10 MG/1
10 TABLET ORAL 2 TIMES DAILY
Status: DISCONTINUED | OUTPATIENT
Start: 2022-05-05 | End: 2022-05-06 | Stop reason: HOSPADM

## 2022-05-05 RX ORDER — DONEPEZIL HYDROCHLORIDE 5 MG/1
10 TABLET, FILM COATED ORAL NIGHTLY
Status: DISCONTINUED | OUTPATIENT
Start: 2022-05-05 | End: 2022-05-06 | Stop reason: HOSPADM

## 2022-05-05 RX ORDER — HYDROCODONE BITARTRATE AND ACETAMINOPHEN 5; 325 MG/1; MG/1
1 TABLET ORAL 3 TIMES DAILY PRN
Status: DISCONTINUED | OUTPATIENT
Start: 2022-05-05 | End: 2022-05-06 | Stop reason: HOSPADM

## 2022-05-05 RX ORDER — LANOLIN ALCOHOL/MO/W.PET/CERES
1 CREAM (GRAM) TOPICAL DAILY
Status: DISCONTINUED | OUTPATIENT
Start: 2022-05-05 | End: 2022-05-06 | Stop reason: HOSPADM

## 2022-05-05 RX ORDER — ACETAMINOPHEN 325 MG/1
650 TABLET ORAL EVERY 6 HOURS PRN
Status: DISCONTINUED | OUTPATIENT
Start: 2022-05-05 | End: 2022-05-06 | Stop reason: HOSPADM

## 2022-05-05 RX ORDER — PRIMIDONE 50 MG/1
50 TABLET ORAL 2 TIMES DAILY
Status: DISCONTINUED | OUTPATIENT
Start: 2022-05-05 | End: 2022-05-06 | Stop reason: HOSPADM

## 2022-05-05 RX ORDER — PRAVASTATIN SODIUM 40 MG/1
40 TABLET ORAL NIGHTLY
Status: DISCONTINUED | OUTPATIENT
Start: 2022-05-05 | End: 2022-05-06 | Stop reason: HOSPADM

## 2022-05-05 RX ORDER — IPRATROPIUM BROMIDE AND ALBUTEROL SULFATE 2.5; .5 MG/3ML; MG/3ML
3 SOLUTION RESPIRATORY (INHALATION) EVERY 6 HOURS PRN
Status: DISCONTINUED | OUTPATIENT
Start: 2022-05-05 | End: 2022-05-06 | Stop reason: HOSPADM

## 2022-05-05 RX ORDER — ASPIRIN 81 MG/1
81 TABLET ORAL DAILY
Status: DISCONTINUED | OUTPATIENT
Start: 2022-05-05 | End: 2022-05-06 | Stop reason: HOSPADM

## 2022-05-05 RX ORDER — ONDANSETRON 4 MG/1
4 TABLET, ORALLY DISINTEGRATING ORAL EVERY 8 HOURS PRN
Status: DISCONTINUED | OUTPATIENT
Start: 2022-05-05 | End: 2022-05-06 | Stop reason: HOSPADM

## 2022-05-05 RX ORDER — DULOXETIN HYDROCHLORIDE 60 MG/1
60 CAPSULE, DELAYED RELEASE ORAL DAILY
Status: DISCONTINUED | OUTPATIENT
Start: 2022-05-05 | End: 2022-05-06 | Stop reason: HOSPADM

## 2022-05-05 RX ORDER — ISOSORBIDE MONONITRATE 30 MG/1
60 TABLET, EXTENDED RELEASE ORAL DAILY
Status: DISCONTINUED | OUTPATIENT
Start: 2022-05-05 | End: 2022-05-06 | Stop reason: HOSPADM

## 2022-05-05 RX ADMIN — GABAPENTIN 300 MG: 300 CAPSULE ORAL at 09:05

## 2022-05-05 RX ADMIN — DONEPEZIL HYDROCHLORIDE 10 MG: 5 TABLET ORAL at 08:05

## 2022-05-05 RX ADMIN — ASPIRIN 81 MG: 81 TABLET, COATED ORAL at 09:05

## 2022-05-05 RX ADMIN — ISOSORBIDE MONONITRATE 60 MG: 30 TABLET, EXTENDED RELEASE ORAL at 09:05

## 2022-05-05 RX ADMIN — DULOXETINE HYDROCHLORIDE 60 MG: 60 CAPSULE, DELAYED RELEASE ORAL at 09:05

## 2022-05-05 RX ADMIN — PRIMIDONE 50 MG: 50 TABLET ORAL at 09:05

## 2022-05-05 RX ADMIN — GABAPENTIN 300 MG: 300 CAPSULE ORAL at 08:05

## 2022-05-05 RX ADMIN — MEMANTINE 10 MG: 10 TABLET ORAL at 09:05

## 2022-05-05 RX ADMIN — PRAVASTATIN SODIUM 40 MG: 40 TABLET ORAL at 08:05

## 2022-05-05 RX ADMIN — FERROUS SULFATE TAB 325 MG (65 MG ELEMENTAL FE) 1 EACH: 325 (65 FE) TAB at 09:05

## 2022-05-05 RX ADMIN — MEMANTINE 10 MG: 10 TABLET ORAL at 08:05

## 2022-05-05 RX ADMIN — PRIMIDONE 50 MG: 50 TABLET ORAL at 08:05

## 2022-05-05 NOTE — HPI
Patient is a 71-year-old female with an extensive medical history including an epidural abscess requiring several different surgical debridements and interventions.  The patient has a history of obesity hypoventilation obstructive sleep apnea chronic respiratory failure and multiple hospitalizations related to sepsis and respiratory difficulties.  The patient has a BiPAP at home and I have discussed the case with the patient's daughter this morning and the daughter states that she will no longer wear the BiPAP.  She states that she is tired and does not want to continue on.  The patient became more and more somnolent over the last few days and ultimately was brought into the emergency department and had a pCO2 of 90.  She was placed on BiPAP and had some improvement.  This morning the patient opens eyes with loud vigorous stimulation she attempts to talk to me through her mask but I have mainly communicated with the daughter.

## 2022-05-05 NOTE — RESPIRATORY THERAPY
05/05/22 1532   Patient Assessment/Suction   Level of Consciousness (AVPU) alert   Respiratory Effort Unlabored  (pt denies shortness of breath at this time, Pauline CASIANO notified)   Expansion/Accessory Muscles/Retractions expansion symmetric;no retractions   Rhythm/Pattern, Respiratory unlabored   PRE-TX-O2   O2 Device (Oxygen Therapy) (S)  nasal cannula   $ Is the patient on Low Flow Oxygen? Yes   Flow (L/min) (S)  4   Oxygen Concentration (%) 36   SpO2 (!) 90 %   Pulse Oximetry Type Intermittent   $ Pulse Oximetry - Multiple Charge Pulse Oximetry - Multiple   Pulse 76   Resp 20   Positioning HOB elevated 30 degrees   Skin Integrity   Area Observed Left;Right;Cheek;Bridge of nose;Chin;Forehead   Skin Appearance redness blanchable  (blanchable redness on bridge of patient's nose, Pauline CASIANO aware)   Barrier used? Liquid Filled Cushion  (Liquicell Cushion in place on bridge of patient's nose)   Preset CPAP/BiPAP Settings   Mode Of Delivery Standby  (PRN Bipap at bedside on Standby, Pauline CASIANO notified)   Respiratory Evaluation   $ Care Plan Tech Time 15 min   Home Oxygen   Has Home Oxygen? Yes  (Patient reports having home oxygen, but is unsure of liter flow used at home.)   Liter Flow   (patient unsure of liter flow used at home)   Duration continuous   Route nasal cannula       Patient more alert this afternoon and requesting to take Bipap off. Patient placed on 4lpm nasal cannula at this time -- O2 saturation 90%. Bipap remains at bedside on Standby. No distress noted -- patient denies shortness of breath. Pauline CASIANO notified

## 2022-05-05 NOTE — PLAN OF CARE
[facility]  Initial Discharge Assessment       Primary Care Provider: Joe Fuller III, MD    Admission Diagnosis: Acute on chronic respiratory failure with hypoxia and hypercapnia [J96.21, J96.22]  Altered mental status, unspecified [R41.82]    Admission Date: 5/4/2022  Expected Discharge Date:          Payor: MEDICARE / Plan: MEDICARE PART A & B / Product Type: Government /     Extended Emergency Contact Information  Primary Emergency Contact: Sharon Turk  Mobile Phone: [phone]  Relation: Daughter  Secondary Emergency Contact: Christelle Yoder  Mobile Phone: [phone]  Relation: Grandchild    Discharge Plan A: Home with family  Discharge Plan B: Home with family, Hospice/home      [address] [address]  Phone: [phone] Fax: [phone]      Initial Assessment (most recent)     Adult Discharge Assessment - 05/05/22 0833        Discharge Assessment    Assessment Type Discharge Planning Assessment     Confirmed/corrected address, phone number and insurance Yes     Confirmed Demographics Correct on Facesheet     Source of Information family;health record     Reason For Admission Acute on chronic respiratory failure with hypoxia and hypercapnia, Altered mental status, unspecified     Lives With child([place]), adult     Do you expect to return to your current living situation? Yes     Do you have help at home or someone to help you manage your care at home? Yes     Who are your caregiver(s) and their phone number(s)? Sharon (daughter) [phone]  and Christelle (grandchild)     Prior to hospitilization cognitive status: Unable to Assess     Current cognitive status: Unable to Assess   The patient was on the BiPAP machine.    Walking or Climbing Stairs Difficulty ambulation difficulty, dependent;ambulation difficulty, requires equipment;transferring difficulty, requires equipment;transferring difficulty, dependent      Mobility Management wheelchair and yesenia lift     Dressing/Bathing Difficulty bathing difficulty, dependent;dressing difficulty, dependent     Home Accessibility wheelchair accessible;other (see comments)   ramp attached to the home    Home Layout Able to live on 1st floor     Equipment Currently Used at Home lift device;wheelchair;other (see comments);oxygen;CPAP;hospital bed     Patient currently being followed by outpatient case management? No     Do you currently have service(s) that help you manage your care at home? Yes     Name and Contact number of agency [facility]-[phone]     Is the pt/caregiver preference to resume services with current agency --   TBD    Do you take prescription medications? Yes     Do you have prescription coverage? Yes     Coverage Medicare and [employer]     Do you have any problems affording any of your prescribed medications? No     Is the patient taking medications as prescribed? yes     Who is going to help you get home at discharge? Ambulance     How do you get to doctors appointments? other (see comments)   Ambulance Service    Are you on dialysis? No     Do you take coumadin? No     Discharge Plan A Home with family     Discharge Plan B Home with family;Hospice/home     DME Needed Upon Discharge  other (see comments)   TBD                Readmission Assessment (most recent)     Readmission Assessment - 05/05/22 1054        Readmission    Why were you hospitalized in the last 30 days? Sepsis     Why were you readmitted? New medical problem   Acute on chronic respiratory failure with hypoxia and hypercapnia    When you left the hospital how did you feel? --   Unable to assess. The patient is on the BiPAP machine.    When you left the hospital where did you go? Home with Family   The patient was discharged home with her family and she had home health care.    Tell me about what happened between when you left the hospital and the day you returned. --   Unable to assess. The  patient is on the BiPAP machine.    Did you call anyone? --   According to the patient's medical record, she was transported to the hospital via EMS.    Did you have  a follow-up appointment on discharge? Yes                Initial discharge assessment is completed.  The patient lives with her family in a single story home. She requires total assistance with her ADLs. . The patient will be returning home with her family upon discharge. The patient currently has home health with [facility]. At this time, the patient does not require any assistance from case management. The patient will be returning home with her family once she is discharged.    Will continue to monitor.

## 2022-05-05 NOTE — NURSING
Pt O2 stat dropped to 78 on tele monitor while on 4L NC. Placed pt back on BIPAP O2 increased to 91. Will continue to monitior.

## 2022-05-05 NOTE — RESPIRATORY THERAPY
05/05/22 1155   PRE-TX-O2   O2 Device (Oxygen Therapy) (S)  BiPAP   $ Is the patient on Low Flow Oxygen? Yes   Oxygen Concentration (%) (S)  50   SpO2 (!) 92 %   Respiratory Evaluation   $ Care Plan Tech Time 15 min     Weaned FIO2 to 50% on Bipap at this time per Dr. Fuller's orders to wean FIO2 to keep O2 sats >= 88%. Pauline CASIANO notified of change made

## 2022-05-05 NOTE — SUBJECTIVE & OBJECTIVE
Past Medical History:   Diagnosis Date    Alzheimer disease     Alzheimer disease     Bronchitis due to Staphylococcus aureus     CAD (coronary artery disease)     Coronary artery disease     Dementia     Discitis     Epidural abscess     HTN (hypertension)     Hydronephrosis     Hyperlipemia     Hyperlipidemia     Hypertension     Mixed hyperlipidemia     MRSA bacteremia     Myopathy     Myopathy     Non-ST elevation (NSTEMI) myocardial infarction     NSTEMI (non-ST elevation myocardial infarction)     Obesity     Obesity, unspecified     Old myocardial infarction     Paraplegia     Pneumonia     Pneumonia     Sleep apnea     Sleep apnea, unspecified        Past Surgical History:   Procedure Laterality Date    APPENDECTOMY      BLADDER SUSPENSION      CHOLECYSTECTOMY      CHOLECYSTECTOMY      CORONARY STENT PLACEMENT      HYSTERECTOMY      SURGICAL REMOVAL OF VERTEBRAL BODY OF THORACIC SPINE N/A 12/08/2020    Procedure: T9-T10 corpectomy, posterior instrumented fusion T7-T12.;  Surgeon: Everardo Gongora MD;  Location: [facility] OR [address] [place] [address];  Service: Neurosurgery;  Laterality: N/A;    TONSILLECTOMY         Review of patient's allergies indicates:   Allergen Reactions    Hydroxyzine     Tizanidine        No current facility-administered medications on file prior to encounter.     Current Outpatient Medications on File Prior to Encounter   Medication Sig    acetaminophen (TYLENOL) 325 MG tablet Take 2 tablets (650 mg total) by mouth every 6 (six) hours as needed (HEADACHE, TEMPERATURE - FEVER > 100.4). (Patient not taking: Reported on 1/19/2021)    acetaminophen (TYLENOL) 325 MG tablet 1 tablet EVERY 8 HOURS (route: oral)    albuterol-ipratropium (DUO-NEB) 2.5 mg-0.5 mg/3 mL nebulizer solution Take 3 mLs by nebulization every 6 (six) hours as needed for Wheezing or Shortness of Breath. Rescue    aspirin (ECOTRIN) 81 MG EC tablet Take 81 mg by mouth once daily.    ciprofloxacin HCl (CIPRO) 250 MG tablet     donepeziL  (ARICEPT) 10 MG tablet Take 10 mg by mouth every evening.    DULoxetine (CYMBALTA) 60 MG capsule Take 60 mg by mouth once daily.    ferrous sulfate 324 mg (65 mg iron) TbEC Take 325 mg by mouth every evening.    furosemide (LASIX) 40 MG tablet Take 1 tablet (40 mg total) by mouth once daily.    gabapentin (NEURONTIN) 300 MG capsule Take 1 capsule (300 mg total) by mouth 2 (two) times daily.    gentamicin (GARAMYCIN) 0.1 % cream Apply topically once daily.    HYDROcodone-acetaminophen (NORCO) 5-325 mg per tablet Take 1 tablet by mouth 3 (three) times daily as needed.    HYDROcodone-acetaminophen (NORCO) 5-325 mg per tablet 1 tablet EVERY 8 HOURS (route: oral)    hydrocortisone 1 % lotion 1 mL DAILY (route: topical)    isosorbide mononitrate (IMDUR) 60 MG 24 hr tablet Take 1 tablet (60 mg total) by mouth once daily.    memantine (NAMENDA) 10 MG Tab Take 1 tablet (10 mg total) by mouth 2 (two) times daily.    ondansetron (ZOFRAN-ODT) 4 MG TbDL Take 1 tablet (4 mg total) by mouth every 8 (eight) hours as needed. (Patient not taking: Reported on 1/19/2021)    ondansetron (ZOFRAN-ODT) 4 MG TbDL Take 1 tablet (4 mg total) by mouth every 8 (eight) hours as needed (nausea).    oxyCODONE (ROXICODONE) 10 mg Tab immediate release tablet Take 1 tablet (10 mg total) by mouth every 6 (six) hours as needed for Pain.    pantoprazole (PROTONIX) 40 MG tablet Take 1 tablet (40 mg total) by mouth before breakfast.    pravastatin (PRAVACHOL) 40 MG tablet Take 40 mg by mouth every evening.    primidone (MYSOLINE) 50 MG Tab Take 50 mg by mouth 2 (two) times daily.    primidone (MYSOLINE) 50 MG Tab 1 tablet 2 TIMES DAILY (route: oral)    senna-docusate 8.6-50 mg (PERICOLACE) 8.6-50 mg per tablet Take 2 tablets by mouth nightly as needed for Constipation.    [DISCONTINUED] aluminum & magnesium hydroxide-simethicone (MYLANTA MAX STRENGTH) 400-400-40 mg/5 mL suspension Take 30 mLs by mouth every 6 (six) hours as needed for Indigestion.  (Patient not taking: Reported on 2021)    [DISCONTINUED] clopidogreL (PLAVIX) 75 mg tablet Take 1 tablet (75 mg total) by mouth once daily.    [DISCONTINUED] miconazole NITRATE 2 % (MICOTIN) 2 % top powder Apply topically 2 (two) times daily. for 15 days     Family History       Problem Relation (Age of Onset)    Heart disease Father    Heart failure Mother    Hypertension Father    Multiple sclerosis Sister    Stroke Mother          Tobacco Use    Smoking status: Former Smoker     Types: Cigarettes     Quit date: 2000     Years since quittin.3    Smokeless tobacco: Never Used   Substance and Sexual Activity    Alcohol use: Not Currently    Drug use: Never    Sexual activity: Not Currently     Review of Systems   Unable to perform ROS: Mental status change   Objective:     Vital Signs (Most Recent):  Temp: 98 °F (36.7 °C) (22)  Pulse: 66 (22)  Resp: 18 (22)  BP: 136/63 (22)  SpO2: (!) 93 % (22)   Vital Signs (24h Range):  Temp:  [98 °F (36.7 °C)-98.8 °F (37.1 °C)] 98 °F (36.7 °C)  Pulse:  [65-76] 66  Resp:  [17-30] 18  SpO2:  [90 %-99 %] 93 %  BP: (118-147)/(58-83) 136/63     Weight: (!) 160.6 kg (354 lb)  Body mass index is 52.28 kg/m².    Physical Exam  Constitutional:       General: She is in acute distress.      Appearance: She is obese. She is ill-appearing.   HENT:      Head: Normocephalic and atraumatic.      Nose: Nose normal. No congestion or rhinorrhea.      Mouth/Throat:      Mouth: Mucous membranes are dry.      Pharynx: No oropharyngeal exudate.   Eyes:      General: No scleral icterus.     Extraocular Movements: Extraocular movements intact.   Neck:      Vascular: No carotid bruit.   Cardiovascular:      Rate and Rhythm: Normal rate and regular rhythm.      Heart sounds: No murmur heard.    No friction rub. No gallop.   Pulmonary:      Effort: Respiratory distress present.      Breath sounds: No stridor. No wheezing, rhonchi or rales.    Chest:      Chest wall: No tenderness.   Abdominal:      General: Bowel sounds are normal. There is no distension.      Palpations: Abdomen is soft. There is no mass.      Tenderness: There is no abdominal tenderness. There is no right CVA tenderness, left CVA tenderness, guarding or rebound.      Hernia: No hernia is present.   Musculoskeletal:         General: Swelling present. No tenderness or deformity.      Cervical back: Normal range of motion. No rigidity.      Right lower leg: Edema present.      Left lower leg: Edema present.   Lymphadenopathy:      Cervical: No cervical adenopathy.   Skin:     Coloration: Skin is pale. Skin is not jaundiced.   Neurological:      Cranial Nerves: No cranial nerve deficit.      Sensory: Sensory deficit present.      Motor: Weakness present.           Significant Labs: All pertinent labs within the past 24 hours have been reviewed.    Significant Imaging: I have reviewed all pertinent imaging results/findings within the past 24 hours.

## 2022-05-05 NOTE — H&P
[facility] Medicine  History & Physical    Patient Name: Martina Betancourt  MRN: [id number]  Patient Class: IP- Inpatient  Admission Date: 5/4/2022  Attending Physician: Joe Fuller III, MD  Primary Care Provider: Joe Fuller III, MD         Patient information was obtained from patient, caregiver / friend, past medical records and ER records.     Subjective:     Principal Problem:Acute on chronic respiratory failure with hypoxia and hypercapnia    Chief Complaint:   Chief Complaint   Patient presents with    Altered Mental Status     Pt to ED via EMS - pt from home, family stated that pt has been increasingly more altered over the past 2-3 days and talking out of her head. SPO2 found to be in 70s on home oxygen, improving to mid-90's on EMS oxygen. ETCO2 80-90.        HPI: Patient is a 71-year-old female with an extensive medical history including an epidural abscess requiring several different surgical debridements and interventions.  The patient has a history of obesity hypoventilation obstructive sleep apnea chronic respiratory failure and multiple hospitalizations related to sepsis and respiratory difficulties.  The patient has a BiPAP at home and I have discussed the case with the patient's daughter this morning and the daughter states that she will no longer wear the BiPAP.  She states that she is tired and does not want to continue on.  The patient became more and more somnolent over the last few days and ultimately was brought into the emergency department and had a pCO2 of 90.  She was placed on BiPAP and had some improvement.  This morning the patient opens eyes with loud vigorous stimulation she attempts to talk to me through her mask but I have mainly communicated with the daughter.      Past Medical History:   Diagnosis Date    Alzheimer disease     Alzheimer disease     Bronchitis due to Staphylococcus aureus     CAD (coronary artery disease)     Coronary artery disease      Dementia     Discitis     Epidural abscess     HTN (hypertension)     Hydronephrosis     Hyperlipemia     Hyperlipidemia     Hypertension     Mixed hyperlipidemia     MRSA bacteremia     Myopathy     Myopathy     Non-ST elevation (NSTEMI) myocardial infarction     NSTEMI (non-ST elevation myocardial infarction)     Obesity     Obesity, unspecified     Old myocardial infarction     Paraplegia     Pneumonia     Pneumonia     Sleep apnea     Sleep apnea, unspecified        Past Surgical History:   Procedure Laterality Date    APPENDECTOMY      BLADDER SUSPENSION      CHOLECYSTECTOMY      CHOLECYSTECTOMY      CORONARY STENT PLACEMENT      HYSTERECTOMY      SURGICAL REMOVAL OF VERTEBRAL BODY OF THORACIC SPINE N/A 12/08/2020    Procedure: T9-T10 corpectomy, posterior instrumented fusion T7-T12.;  Surgeon: Everardo Gongora MD;  Location: [facility] OR [address] [place] [address];  Service: Neurosurgery;  Laterality: N/A;    TONSILLECTOMY         Review of patient's allergies indicates:   Allergen Reactions    Hydroxyzine     Tizanidine        No current facility-administered medications on file prior to encounter.     Current Outpatient Medications on File Prior to Encounter   Medication Sig    acetaminophen (TYLENOL) 325 MG tablet Take 2 tablets (650 mg total) by mouth every 6 (six) hours as needed (HEADACHE, TEMPERATURE - FEVER > 100.4). (Patient not taking: Reported on 1/19/2021)    acetaminophen (TYLENOL) 325 MG tablet 1 tablet EVERY 8 HOURS (route: oral)    albuterol-ipratropium (DUO-NEB) 2.5 mg-0.5 mg/3 mL nebulizer solution Take 3 mLs by nebulization every 6 (six) hours as needed for Wheezing or Shortness of Breath. Rescue    aspirin (ECOTRIN) 81 MG EC tablet Take 81 mg by mouth once daily.    ciprofloxacin HCl (CIPRO) 250 MG tablet     donepeziL (ARICEPT) 10 MG tablet Take 10 mg by mouth every evening.    DULoxetine (CYMBALTA) 60 MG capsule Take 60 mg by mouth once daily.    ferrous sulfate 324 mg (65 mg  iron) TbEC Take 325 mg by mouth every evening.    furosemide (LASIX) 40 MG tablet Take 1 tablet (40 mg total) by mouth once daily.    gabapentin (NEURONTIN) 300 MG capsule Take 1 capsule (300 mg total) by mouth 2 (two) times daily.    gentamicin (GARAMYCIN) 0.1 % cream Apply topically once daily.    HYDROcodone-acetaminophen (NORCO) 5-325 mg per tablet Take 1 tablet by mouth 3 (three) times daily as needed.    HYDROcodone-acetaminophen (NORCO) 5-325 mg per tablet 1 tablet EVERY 8 HOURS (route: oral)    hydrocortisone 1 % lotion 1 mL DAILY (route: topical)    isosorbide mononitrate (IMDUR) 60 MG 24 hr tablet Take 1 tablet (60 mg total) by mouth once daily.    memantine (NAMENDA) 10 MG Tab Take 1 tablet (10 mg total) by mouth 2 (two) times daily.    ondansetron (ZOFRAN-ODT) 4 MG TbDL Take 1 tablet (4 mg total) by mouth every 8 (eight) hours as needed. (Patient not taking: Reported on 1/19/2021)    ondansetron (ZOFRAN-ODT) 4 MG TbDL Take 1 tablet (4 mg total) by mouth every 8 (eight) hours as needed (nausea).    oxyCODONE (ROXICODONE) 10 mg Tab immediate release tablet Take 1 tablet (10 mg total) by mouth every 6 (six) hours as needed for Pain.    pantoprazole (PROTONIX) 40 MG tablet Take 1 tablet (40 mg total) by mouth before breakfast.    pravastatin (PRAVACHOL) 40 MG tablet Take 40 mg by mouth every evening.    primidone (MYSOLINE) 50 MG Tab Take 50 mg by mouth 2 (two) times daily.    primidone (MYSOLINE) 50 MG Tab 1 tablet 2 TIMES DAILY (route: oral)    senna-docusate 8.6-50 mg (PERICOLACE) 8.6-50 mg per tablet Take 2 tablets by mouth nightly as needed for Constipation.    [DISCONTINUED] aluminum & magnesium hydroxide-simethicone (MYLANTA MAX STRENGTH) 400-400-40 mg/5 mL suspension Take 30 mLs by mouth every 6 (six) hours as needed for Indigestion. (Patient not taking: Reported on 1/19/2021)    [DISCONTINUED] clopidogreL (PLAVIX) 75 mg tablet Take 1 tablet (75 mg total) by mouth once daily.     [DISCONTINUED] miconazole NITRATE 2 % (MICOTIN) 2 % top powder Apply topically 2 (two) times daily. for 15 days     Family History       Problem Relation (Age of Onset)    Heart disease Father    Heart failure Mother    Hypertension Father    Multiple sclerosis Sister    Stroke Mother          Tobacco Use    Smoking status: Former Smoker     Types: Cigarettes     Quit date: 2000     Years since quittin.3    Smokeless tobacco: Never Used   Substance and Sexual Activity    Alcohol use: Not Currently    Drug use: Never    Sexual activity: Not Currently     Review of Systems   Unable to perform ROS: Mental status change   Objective:     Vital Signs (Most Recent):  Temp: 98 °F (36.7 °C) (22)  Pulse: 66 (22)  Resp: 18 (22)  BP: 136/63 (22)  SpO2: (!) 93 % (22)   Vital Signs (24h Range):  Temp:  [98 °F (36.7 °C)-98.8 °F (37.1 °C)] 98 °F (36.7 °C)  Pulse:  [65-76] 66  Resp:  [17-30] 18  SpO2:  [90 %-99 %] 93 %  BP: (118-147)/(58-83) 136/63     Weight: (!) 160.6 kg (354 lb)  Body mass index is 52.28 kg/m².    Physical Exam  Constitutional:       General: She is in acute distress.      Appearance: She is obese. She is ill-appearing.   HENT:      Head: Normocephalic and atraumatic.      Nose: Nose normal. No congestion or rhinorrhea.      Mouth/Throat:      Mouth: Mucous membranes are dry.      Pharynx: No oropharyngeal exudate.   Eyes:      General: No scleral icterus.     Extraocular Movements: Extraocular movements intact.   Neck:      Vascular: No carotid bruit.   Cardiovascular:      Rate and Rhythm: Normal rate and regular rhythm.      Heart sounds: No murmur heard.    No friction rub. No gallop.   Pulmonary:      Effort: Respiratory distress present.      Breath sounds: No stridor. No wheezing, rhonchi or rales.   Chest:      Chest wall: No tenderness.   Abdominal:      General: Bowel sounds are normal. There is no distension.      Palpations: Abdomen  is soft. There is no mass.      Tenderness: There is no abdominal tenderness. There is no right CVA tenderness, left CVA tenderness, guarding or rebound.      Hernia: No hernia is present.   Musculoskeletal:         General: Swelling present. No tenderness or deformity.      Cervical back: Normal range of motion. No rigidity.      Right lower leg: Edema present.      Left lower leg: Edema present.   Lymphadenopathy:      Cervical: No cervical adenopathy.   Skin:     Coloration: Skin is pale. Skin is not jaundiced.   Neurological:      Cranial Nerves: No cranial nerve deficit.      Sensory: Sensory deficit present.      Motor: Weakness present.           Significant Labs: All pertinent labs within the past 24 hours have been reviewed.    Significant Imaging: I have reviewed all pertinent imaging results/findings within the past 24 hours.    Assessment/Plan:     * Acute on chronic respiratory failure with hypoxia and hypercapnia  Sounds like patient may be ready for hospice care, family to meet today and discuss.    AMS (altered mental status)        Spinal cord injury of thoracic region without bone injury        Debility        CAD (coronary artery disease)        Severe obesity (BMI >= 40)  Body mass index is 52.28 kg/m². Morbid obesity complicates all aspects of disease management from diagnostic modalities to treatment. Weight loss encouraged and health benefits explained to patient.         Dementia without behavioral disturbance          VTE Risk Mitigation (From admission, onward)         Ordered     IP VTE HIGH RISK PATIENT  Once         05/04/22 1831     Place sequential compression device  Until discontinued         05/04/22 1831                   Joe Fuller III, MD  Department of Hospital Medicine   [facility]

## 2022-05-05 NOTE — ASSESSMENT & PLAN NOTE
Body mass index is 52.28 kg/m². Morbid obesity complicates all aspects of disease management from diagnostic modalities to treatment. Weight loss encouraged and health benefits explained to patient.

## 2022-05-06 VITALS
TEMPERATURE: 97 F | WEIGHT: 293 LBS | DIASTOLIC BLOOD PRESSURE: 63 MMHG | RESPIRATION RATE: 18 BRPM | BODY MASS INDEX: 43.4 KG/M2 | HEART RATE: 67 BPM | HEIGHT: 69 IN | SYSTOLIC BLOOD PRESSURE: 136 MMHG | OXYGEN SATURATION: 96 %

## 2022-05-06 PROCEDURE — 99900035 HC TECH TIME PER 15 MIN (STAT)

## 2022-05-06 PROCEDURE — 94660 CPAP INITIATION&MGMT: CPT

## 2022-05-06 PROCEDURE — 27000221 HC OXYGEN, UP TO 24 HOURS

## 2022-05-06 PROCEDURE — 99900031 HC PATIENT EDUCATION (STAT)

## 2022-05-06 PROCEDURE — 25000003 PHARM REV CODE 250: Performed by: INTERNAL MEDICINE

## 2022-05-06 PROCEDURE — 94761 N-INVAS EAR/PLS OXIMETRY MLT: CPT

## 2022-05-06 RX ADMIN — GABAPENTIN 300 MG: 300 CAPSULE ORAL at 08:05

## 2022-05-06 RX ADMIN — ASPIRIN 81 MG: 81 TABLET, COATED ORAL at 08:05

## 2022-05-06 RX ADMIN — MEMANTINE 10 MG: 10 TABLET ORAL at 08:05

## 2022-05-06 RX ADMIN — ISOSORBIDE MONONITRATE 60 MG: 30 TABLET, EXTENDED RELEASE ORAL at 08:05

## 2022-05-06 RX ADMIN — PRIMIDONE 50 MG: 50 TABLET ORAL at 08:05

## 2022-05-06 RX ADMIN — FERROUS SULFATE TAB 325 MG (65 MG ELEMENTAL FE) 1 EACH: 325 (65 FE) TAB at 08:05

## 2022-05-06 RX ADMIN — DULOXETINE HYDROCHLORIDE 60 MG: 60 CAPSULE, DELAYED RELEASE ORAL at 08:05

## 2022-05-06 NOTE — PLAN OF CARE
05/06/22 0909   Medicare Message   Important Message from Medicare regarding Discharge Appeal Rights Explained to patient/caregiver;Other (comments)  (verbal to daughter per phone)   Date IMM was signed 05/06/22   Time IMM was signed 0910   Spoke with daughter per phone.  Explained patient is discharging home today.  Explained medicare IM rights.  She is in agreement with discharge plan, and gives verbal consent per phone.

## 2022-05-06 NOTE — DISCHARGE SUMMARY
[facility] Medicine  Discharge Summary      Patient Name: Martina Betancourt  MRN: [id number]  Patient Class: IP- Inpatient  Admission Date: 5/4/2022  Hospital Length of Stay: 2 days  Discharge Date and Time:  05/06/2022 8:44 AM  Attending Physician: Joe Fuller III, MD   Discharging Provider: Joe Fuller III, MD  Primary Care Provider: Joe Fuller III, MD      HPI:   Patient is a 71-year-old female with an extensive medical history including an epidural abscess requiring several different surgical debridements and interventions.  The patient has a history of obesity hypoventilation obstructive sleep apnea chronic respiratory failure and multiple hospitalizations related to sepsis and respiratory difficulties.  The patient has a BiPAP at home and I have discussed the case with the patient's daughter this morning and the daughter states that she will no longer wear the BiPAP.  She states that she is tired and does not want to continue on.  The patient became more and more somnolent over the last few days and ultimately was brought into the emergency department and had a pCO2 of 90.  She was placed on BiPAP and had some improvement.  This morning the patient opens eyes with loud vigorous stimulation she attempts to talk to me through her mask but I have mainly communicated with the daughter.      * No surgery found *      Hospital Course:   Discharge Note:  Patient's family met with her and the patient agrees to use a noninvasive ventilator at home.  Pre see my care plan update note on specifics for her requirements.  The patient had discontinued wearing her CPAP and several other complications have arisen and she was admitted with acute on chronic respiratory failure both hypoxic and hypercapnic.  Patient has no other acute medical issues at this time and can be discharged home when her noninvasive ventilator is delivered.  I have spoken to the daughter and they understand.  They will use the  device q.h.s. and p.r.n..       Goals of Care Treatment Preferences:  Code Status: DNR       [place]: Yes           Consults:   Consults (From admission, onward)        Status Ordering Provider     IP consult to case management  Once        Provider:  (Not yet assigned)    Acknowledged ERIC FARRELL III          AMS (altered mental status)        Spinal cord injury of thoracic region without bone injury        Debility        CAD (coronary artery disease)        Severe obesity (BMI >= 40)  Body mass index is 52.28 kg/m². Morbid obesity complicates all aspects of disease management from diagnostic modalities to treatment. Weight loss encouraged and health benefits explained to patient.         Dementia without behavioral disturbance          Final Active Diagnoses:    Diagnosis Date Noted POA    PRINCIPAL PROBLEM:  Acute on chronic respiratory failure with hypoxia and hypercapnia [J96.21, J96.22] 04/07/2022 Yes    AMS (altered mental status) [R41.82] 04/07/2022 Yes    Spinal cord injury of thoracic region without bone injury [S24.109A] 12/18/2020 Yes    Debility [R53.81] 10/21/2020 Yes    CAD (coronary artery disease) [I25.10] 10/08/2020 Yes    Severe obesity (BMI >= 40) [E66.01] 08/10/2020 Yes    Dementia without behavioral disturbance [F03.90] 07/26/2020 Yes      Problems Resolved During this Admission:       Discharged Condition: fair    Disposition: Home or Self Care    Follow Up:    Patient Instructions:      Diet Cardiac     Activity as tolerated       Significant Diagnostic Studies: Labs:   CMP   Recent Labs   Lab 05/04/22  1604 05/05/22  0528    143   K 4.0 3.8   CL 95 97   CO2 >45* >45*    89   BUN 9 12   CREATININE 0.8 0.6   CALCIUM 8.9 9.3   PROT 6.3 6.4   ALBUMIN 2.5* 2.4*   BILITOT 0.2 0.3   ALKPHOS 109 106   AST 9* 10   ALT 12 12   ANIONGAP Unable to calculate Unable to calculate   ESTGFRAFRICA >60.0 >60.0   EGFRNONAA >60.0 >60.0    and CBC   Recent Labs   Lab 05/04/22  1604  05/05/22  0528   WBC 6.19 5.39   HGB 12.3 12.8   HCT 43.4 45.5    203       Pending Diagnostic Studies:     None         Medications:  Reconciled Home Medications:      Medication List      CHANGE how you take these medications    acetaminophen 325 MG tablet  Commonly known as: TYLENOL  Take 2 tablets (650 mg total) by mouth every 6 (six) hours as needed (HEADACHE, TEMPERATURE - FEVER > 100.4).  What changed: Another medication with the same name was removed. Continue taking this medication, and follow the directions you see here.     HYDROcodone-acetaminophen 5-325 mg per tablet  Commonly known as: NORCO  Take 1 tablet by mouth 3 (three) times daily as needed.  What changed: Another medication with the same name was removed. Continue taking this medication, and follow the directions you see here.     ondansetron 4 MG Tbdl  Commonly known as: ZOFRAN-ODT  Take 1 tablet (4 mg total) by mouth every 8 (eight) hours as needed.  What changed: Another medication with the same name was removed. Continue taking this medication, and follow the directions you see here.     primidone 50 MG Tab  Commonly known as: MYSOLINE  Take 50 mg by mouth 2 (two) times daily.  What changed: Another medication with the same name was removed. Continue taking this medication, and follow the directions you see here.        CONTINUE taking these medications    albuterol-ipratropium 2.5 mg-0.5 mg/3 mL nebulizer solution  Commonly known as: DUO-NEB  Take 3 mLs by nebulization every 6 (six) hours as needed for Wheezing or Shortness of Breath. Rescue     aspirin 81 MG EC tablet  Commonly known as: ECOTRIN  Take 81 mg by mouth once daily.     donepeziL 10 MG tablet  Commonly known as: ARICEPT  Take 10 mg by mouth every evening.     DULoxetine 60 MG capsule  Commonly known as: CYMBALTA  Take 60 mg by mouth once daily.     ferrous sulfate 324 mg (65 mg iron) Tbec  Take 325 mg by mouth every evening.     furosemide 40 MG tablet  Commonly known  as: LASIX  Take 1 tablet (40 mg total) by mouth once daily.     gabapentin 300 MG capsule  Commonly known as: NEURONTIN  Take 1 capsule (300 mg total) by mouth 2 (two) times daily.     hydrocortisone 1 % lotion  1 mL DAILY (route: topical)     isosorbide mononitrate 60 MG 24 hr tablet  Commonly known as: IMDUR  Take 1 tablet (60 mg total) by mouth once daily.     memantine 10 MG Tab  Commonly known as: NAMENDA  Take 1 tablet (10 mg total) by mouth 2 (two) times daily.     pantoprazole 40 MG tablet  Commonly known as: PROTONIX  Take 1 tablet (40 mg total) by mouth before breakfast.     pravastatin 40 MG tablet  Commonly known as: PRAVACHOL  Take 40 mg by mouth every evening.     senna-docusate 8.6-50 mg 8.6-50 mg per tablet  Commonly known as: PERICOLACE  Take 2 tablets by mouth nightly as needed for Constipation.        STOP taking these medications    ciprofloxacin HCl 250 MG tablet  Commonly known as: CIPRO     clopidogreL 75 mg tablet  Commonly known as: PLAVIX     gentamicin 0.1 % cream  Commonly known as: GARAMYCIN     oxyCODONE 10 mg Tab immediate release tablet  Commonly known as: ROXICODONE        ASK your doctor about these medications    aluminum & magnesium hydroxide-simethicone 400-400-40 mg/5 mL suspension  Commonly known as: MYLANTA MAX STRENGTH  Take 30 mLs by mouth every 6 (six) hours as needed for Indigestion.     miconazole NITRATE 2 % 2 % top powder  Commonly known as: MICOTIN  Apply topically 2 (two) times daily. for 15 days            Indwelling Lines/Drains at time of discharge:   Lines/Drains/Airways     None                 Time spent on the discharge of patient: 45 minutes         Joe Fuller III, MD  Department of Hospital Medicine  [facility]

## 2022-05-06 NOTE — HOSPITAL COURSE
Discharge Note:  Patient's family met with her and the patient agrees to use a noninvasive ventilator at home.  Pre see my care plan update note on specifics for her requirements.  The patient had discontinued wearing her CPAP and several other complications have arisen and she was admitted with acute on chronic respiratory failure both hypoxic and hypercapnic.  Patient has no other acute medical issues at this time and can be discharged home when her noninvasive ventilator is delivered.  I have spoken to the daughter and they understand.  They will use the device q.h.s. and p.r.n..

## 2022-05-06 NOTE — PLAN OF CARE
[facility] - Med Surg  Discharge Final Note    Primary Care Provider: Joe Fuller III, MD    Expected Discharge Date: 5/6/2022    Final Discharge Note (most recent)     Final Note - 05/06/22 1146        Final Note    Assessment Type Final Discharge Note     Anticipated Discharge Disposition Home-Health Care Svc        Post-Acute Status    Discharge Delays Ambulance Transport/Facility Transport                 Important Message from Medicare  Important Message from Medicare regarding Discharge Appeal Rights: Explained to patient/caregiver, Other (comments) (verbal to daughter per phone)     Date IMM was signed: 05/06/22  Time IMM was signed: 0910     Follow-up providers     Joe Fuller III, MD   Specialty: Internal Medicine   Relationship: PCP - General    [address] [place] [address]   Phone: [phone]       Next Steps: Follow up in 1 week(s)          After-discharge care            Home Medical Care     [facility]   Service: Home Health Services    [facility] [address] [address]   Phone: [phone]                     Final note is completed. The patient will be discharging home with her family. She will continue home health care with [facility]. She does not require any additional assistance from case management at this time.

## 2022-05-06 NOTE — CARE UPDATE
Acute on chronic respiratory failure. Patient has Chronic Respiratory Failure secondary to COPD and Obesity Hypovent. Home BiPAP is not appropriate due to patient requiring a specific targeted tidal volume assisted ventilation via non invasive ventilation which cannot be provided via CPAP/BPAP. This therapy is medically necessary and will provide the most effective treatment in order to effectively decrease work of breathing, improve pulmonary status, and reduce hospital readmissions. AVAPS-AE mode of therapy is required for proper ventilatory support and chronic symptom management.

## 2022-05-06 NOTE — NURSING
Leila arrived at this time for transport. Called Amy with [facility] to make her aware for trilogy machine to be delivered to patients home.

## 2022-05-06 NOTE — PLAN OF CARE
Patient is discharging home today.  Already current patient of [facility].  Spoke with Jessica at [facility], informed her patient is discharging to home today.  SW to fax records to [facility].

## 2022-05-06 NOTE — PLAN OF CARE
Patient is discharging home today.  Contacted Td per phone at [facility],  Sent packet with dichrge instructions to [facility] to obtain trilogy for patient.  Faxed referral per [facility].  Td is aware referral has been sent.

## 2022-05-09 LAB
BACTERIA BLD CULT: NORMAL
BACTERIA BLD CULT: NORMAL

## 2022-05-20 ENCOUNTER — HOSPITAL ENCOUNTER (INPATIENT)
Facility: HOSPITAL | Age: 72
LOS: 3 days | Discharge: HOSPICE | DRG: 189 | End: 2022-05-23
Attending: EMERGENCY MEDICINE | Admitting: INTERNAL MEDICINE
Payer: MEDICARE

## 2022-05-20 DIAGNOSIS — M79.606 PAIN OF LOWER EXTREMITY, UNSPECIFIED LATERALITY: ICD-10-CM

## 2022-05-20 DIAGNOSIS — R40.4 TRANSIENT ALTERATION OF AWARENESS: ICD-10-CM

## 2022-05-20 DIAGNOSIS — J96.22 ACUTE ON CHRONIC RESPIRATORY FAILURE WITH HYPOXIA AND HYPERCAPNIA: ICD-10-CM

## 2022-05-20 DIAGNOSIS — J96.22 ACUTE ON CHRONIC RESPIRATORY FAILURE WITH HYPERCAPNIA: Primary | ICD-10-CM

## 2022-05-20 DIAGNOSIS — J96.02 ACUTE RESPIRATORY FAILURE WITH HYPOXIA AND HYPERCARBIA: ICD-10-CM

## 2022-05-20 DIAGNOSIS — E66.2 OBESITY HYPOVENTILATION SYNDROME: ICD-10-CM

## 2022-05-20 DIAGNOSIS — E87.29 RESPIRATORY ACIDOSIS: ICD-10-CM

## 2022-05-20 DIAGNOSIS — J96.01 ACUTE RESPIRATORY FAILURE WITH HYPOXIA AND HYPERCARBIA: ICD-10-CM

## 2022-05-20 DIAGNOSIS — R53.83 FATIGUE: ICD-10-CM

## 2022-05-20 DIAGNOSIS — L30.4 INTERTRIGO: ICD-10-CM

## 2022-05-20 DIAGNOSIS — J96.21 ACUTE ON CHRONIC RESPIRATORY FAILURE WITH HYPOXIA AND HYPERCAPNIA: ICD-10-CM

## 2022-05-20 LAB
ALBUMIN SERPL BCP-MCNC: 3.1 G/DL (ref 3.5–5.2)
ALP SERPL-CCNC: 111 U/L (ref 55–135)
ALT SERPL W/O P-5'-P-CCNC: 12 U/L (ref 10–44)
ANION GAP SERPL CALC-SCNC: 3 MMOL/L (ref 8–16)
AST SERPL-CCNC: 9 U/L (ref 10–40)
BACTERIA #/AREA URNS HPF: NEGATIVE /HPF
BASOPHILS # BLD AUTO: 0.02 K/UL (ref 0–0.2)
BASOPHILS NFR BLD: 0.3 % (ref 0–1.9)
BILIRUB SERPL-MCNC: 0.4 MG/DL (ref 0.1–1)
BILIRUB UR QL STRIP: NEGATIVE
BUN SERPL-MCNC: 11 MG/DL (ref 8–23)
CALCIUM SERPL-MCNC: 9.1 MG/DL (ref 8.7–10.5)
CHLORIDE SERPL-SCNC: 96 MMOL/L (ref 95–110)
CLARITY UR: CLEAR
CO2 SERPL-SCNC: 43 MMOL/L (ref 23–29)
COLOR UR: YELLOW
CORRECTED TEMPERATURE (PCO2): 84.9 MMHG
CORRECTED TEMPERATURE (PH): 7.35
CORRECTED TEMPERATURE (PO2): 53.8 MMHG
CREAT SERPL-MCNC: 0.7 MG/DL (ref 0.5–1.4)
CTP QC/QA: YES
DIFFERENTIAL METHOD: ABNORMAL
EOSINOPHIL # BLD AUTO: 0.1 K/UL (ref 0–0.5)
EOSINOPHIL NFR BLD: 1.2 % (ref 0–8)
ERYTHROCYTE [DISTWIDTH] IN BLOOD BY AUTOMATED COUNT: 14.6 % (ref 11.5–14.5)
EST. GFR  (AFRICAN AMERICAN): >60 ML/MIN/1.73 M^2
EST. GFR  (NON AFRICAN AMERICAN): >60 ML/MIN/1.73 M^2
FIO2: 28 %
GLUCOSE SERPL-MCNC: 97 MG/DL (ref 70–110)
GLUCOSE UR QL STRIP: NEGATIVE
HCT VFR BLD AUTO: 47.5 % (ref 37–48.5)
HGB BLD-MCNC: 13.2 G/DL (ref 12–16)
HGB UR QL STRIP: NEGATIVE
HYALINE CASTS #/AREA URNS LPF: 8.2 /LPF
IMM GRANULOCYTES # BLD AUTO: 0.02 K/UL (ref 0–0.04)
IMM GRANULOCYTES NFR BLD AUTO: 0.3 % (ref 0–0.5)
KETONES UR QL STRIP: NEGATIVE
LEUKOCYTE ESTERASE UR QL STRIP: ABNORMAL
LPM: 2
LYMPHOCYTES # BLD AUTO: 1.8 K/UL (ref 1–4.8)
LYMPHOCYTES NFR BLD: 26.1 % (ref 18–48)
Lab: ABNORMAL
MCH RBC QN AUTO: 28.3 PG (ref 27–31)
MCHC RBC AUTO-ENTMCNC: 27.8 G/DL (ref 32–36)
MCV RBC AUTO: 102 FL (ref 82–98)
MICROSCOPIC COMMENT: ABNORMAL
MODIFIED ALLEN'S TEST: ABNORMAL
MONOCYTES # BLD AUTO: 0.7 K/UL (ref 0.3–1)
MONOCYTES NFR BLD: 9.9 % (ref 4–15)
NEUTROPHILS # BLD AUTO: 4.3 K/UL (ref 1.8–7.7)
NEUTROPHILS NFR BLD: 62.2 % (ref 38–73)
NITRITE UR QL STRIP: NEGATIVE
NOTIFIED BY: ABNORMAL
NRBC BLD-RTO: 0 /100 WBC
O2DEVICE: ABNORMAL
PCO2 BLDA: 84.9 MMHG (ref 35–45)
PH SMN: 7.35 [PH] (ref 7.34–7.45)
PH UR STRIP: 6 [PH] (ref 5–8)
PLATELET # BLD AUTO: 222 K/UL (ref 150–450)
PMV BLD AUTO: 9.6 FL (ref 9.2–12.9)
PO2 BLDA: 53.8 MMHG (ref 80–100)
POC BASE DEFICIT: 20.8 MMOL/L
POC HCO3: 40.2 MMOL/L
POC NOTIFIED NOTE: ABNORMAL
POC PERFORMED BY: ABNORMAL
POC SATURATED O2: 89.4 %
POC TCO2: 42.2 MMOL/L
POC TEMPERATURE: 37 C
POTASSIUM SERPL-SCNC: 4 MMOL/L (ref 3.5–5.1)
PROT SERPL-MCNC: 7.2 G/DL (ref 6–8.4)
PROT UR QL STRIP: NEGATIVE
PROVIDER NOTIFIED: ABNORMAL
RBC # BLD AUTO: 4.67 M/UL (ref 4–5.4)
RBC #/AREA URNS HPF: 4 /HPF (ref 0–4)
SARS-COV-2 RDRP RESP QL NAA+PROBE: NEGATIVE
SODIUM SERPL-SCNC: 142 MMOL/L (ref 136–145)
SP GR UR STRIP: 1.02 (ref 1–1.03)
SPECIMEN SOURCE: ABNORMAL
SQUAMOUS #/AREA URNS HPF: 3 /HPF
URN SPEC COLLECT METH UR: ABNORMAL
UROBILINOGEN UR STRIP-ACNC: 1 EU/DL
WBC # BLD AUTO: 6.89 K/UL (ref 3.9–12.7)
WBC #/AREA URNS HPF: 25 /HPF (ref 0–5)

## 2022-05-20 PROCEDURE — 99900035 HC TECH TIME PER 15 MIN (STAT)

## 2022-05-20 PROCEDURE — 11000001 HC ACUTE MED/SURG PRIVATE ROOM

## 2022-05-20 PROCEDURE — 87086 URINE CULTURE/COLONY COUNT: CPT | Performed by: EMERGENCY MEDICINE

## 2022-05-20 PROCEDURE — 80053 COMPREHEN METABOLIC PANEL: CPT | Performed by: EMERGENCY MEDICINE

## 2022-05-20 PROCEDURE — 81000 URINALYSIS NONAUTO W/SCOPE: CPT | Performed by: EMERGENCY MEDICINE

## 2022-05-20 PROCEDURE — 85025 COMPLETE CBC W/AUTO DIFF WBC: CPT | Performed by: EMERGENCY MEDICINE

## 2022-05-20 PROCEDURE — 36415 COLL VENOUS BLD VENIPUNCTURE: CPT | Performed by: EMERGENCY MEDICINE

## 2022-05-20 PROCEDURE — 99900031 HC PATIENT EDUCATION (STAT)

## 2022-05-20 PROCEDURE — 27000190 HC CPAP FULL FACE MASK W/VALVE

## 2022-05-20 PROCEDURE — 94761 N-INVAS EAR/PLS OXIMETRY MLT: CPT

## 2022-05-20 PROCEDURE — U0002 COVID-19 LAB TEST NON-CDC: HCPCS | Performed by: EMERGENCY MEDICINE

## 2022-05-20 PROCEDURE — 94660 CPAP INITIATION&MGMT: CPT

## 2022-05-20 PROCEDURE — 27000221 HC OXYGEN, UP TO 24 HOURS

## 2022-05-20 PROCEDURE — 99285 EMERGENCY DEPT VISIT HI MDM: CPT | Mod: 25

## 2022-05-20 RX ORDER — IPRATROPIUM BROMIDE AND ALBUTEROL SULFATE 2.5; .5 MG/3ML; MG/3ML
3 SOLUTION RESPIRATORY (INHALATION) EVERY 4 HOURS
Status: DISCONTINUED | OUTPATIENT
Start: 2022-05-20 | End: 2022-05-21

## 2022-05-20 RX ORDER — SODIUM CHLORIDE 0.9 % (FLUSH) 0.9 %
10 SYRINGE (ML) INJECTION
Status: DISCONTINUED | OUTPATIENT
Start: 2022-05-21 | End: 2022-05-23 | Stop reason: HOSPADM

## 2022-05-21 LAB
BIPAP: ABNORMAL
COMMENTS: ABNORMAL
CORRECTED TEMPERATURE (PCO2): 86.1 MMHG
CORRECTED TEMPERATURE (PH): 7.34
CORRECTED TEMPERATURE (PO2): 76.2 MMHG
FIO2: 40 %
Lab: ABNORMAL
MODIFIED ALLEN'S TEST: ABNORMAL
NOTIFIED BY: ABNORMAL
O2DEVICE: ABNORMAL
PCO2 BLDA: 86.1 MMHG (ref 35–45)
PH SMN: 7.34 [PH] (ref 7.34–7.45)
PO2 BLDA: 76.2 MMHG (ref 80–100)
POC BASE DEFICIT: 20.7 MMOL/L
POC HCO3: 40.5 MMOL/L
POC NOTIFIED NOTE: ABNORMAL
POC PERFORMED BY: ABNORMAL
POC SATURATED O2: 96 %
POC TCO2: 42.5 MMOL/L
POC TEMPERATURE: 37 C
PROVIDER NOTIFIED: ABNORMAL
SPECIMEN SOURCE: ABNORMAL

## 2022-05-21 PROCEDURE — 82803 BLOOD GASES ANY COMBINATION: CPT

## 2022-05-21 PROCEDURE — 94761 N-INVAS EAR/PLS OXIMETRY MLT: CPT

## 2022-05-21 PROCEDURE — 99900035 HC TECH TIME PER 15 MIN (STAT)

## 2022-05-21 PROCEDURE — 27000221 HC OXYGEN, UP TO 24 HOURS

## 2022-05-21 PROCEDURE — 36600 WITHDRAWAL OF ARTERIAL BLOOD: CPT

## 2022-05-21 PROCEDURE — 25000242 PHARM REV CODE 250 ALT 637 W/ HCPCS: Performed by: EMERGENCY MEDICINE

## 2022-05-21 PROCEDURE — 11000001 HC ACUTE MED/SURG PRIVATE ROOM

## 2022-05-21 PROCEDURE — 25000003 PHARM REV CODE 250: Performed by: INTERNAL MEDICINE

## 2022-05-21 PROCEDURE — 99900031 HC PATIENT EDUCATION (STAT)

## 2022-05-21 PROCEDURE — 94660 CPAP INITIATION&MGMT: CPT

## 2022-05-21 PROCEDURE — 94640 AIRWAY INHALATION TREATMENT: CPT

## 2022-05-21 RX ORDER — IPRATROPIUM BROMIDE AND ALBUTEROL SULFATE 2.5; .5 MG/3ML; MG/3ML
3 SOLUTION RESPIRATORY (INHALATION) EVERY 6 HOURS PRN
Status: DISCONTINUED | OUTPATIENT
Start: 2022-05-21 | End: 2022-05-23 | Stop reason: HOSPADM

## 2022-05-21 RX ORDER — PRAVASTATIN SODIUM 40 MG/1
40 TABLET ORAL NIGHTLY
Status: DISCONTINUED | OUTPATIENT
Start: 2022-05-21 | End: 2022-05-23 | Stop reason: HOSPADM

## 2022-05-21 RX ORDER — ISOSORBIDE MONONITRATE 30 MG/1
60 TABLET, EXTENDED RELEASE ORAL DAILY
Status: DISCONTINUED | OUTPATIENT
Start: 2022-05-21 | End: 2022-05-23 | Stop reason: HOSPADM

## 2022-05-21 RX ORDER — PRIMIDONE 50 MG/1
50 TABLET ORAL 2 TIMES DAILY
Status: DISCONTINUED | OUTPATIENT
Start: 2022-05-21 | End: 2022-05-23 | Stop reason: HOSPADM

## 2022-05-21 RX ORDER — LANOLIN ALCOHOL/MO/W.PET/CERES
1 CREAM (GRAM) TOPICAL DAILY
Refills: 2 | Status: DISCONTINUED | OUTPATIENT
Start: 2022-05-21 | End: 2022-05-23 | Stop reason: HOSPADM

## 2022-05-21 RX ORDER — IPRATROPIUM BROMIDE AND ALBUTEROL SULFATE 2.5; .5 MG/3ML; MG/3ML
3 SOLUTION RESPIRATORY (INHALATION)
Status: DISCONTINUED | OUTPATIENT
Start: 2022-05-21 | End: 2022-05-23 | Stop reason: HOSPADM

## 2022-05-21 RX ORDER — PANTOPRAZOLE SODIUM 40 MG/1
40 TABLET, DELAYED RELEASE ORAL
Status: DISCONTINUED | OUTPATIENT
Start: 2022-05-22 | End: 2022-05-23 | Stop reason: HOSPADM

## 2022-05-21 RX ORDER — ASPIRIN 81 MG/1
81 TABLET ORAL DAILY
Status: DISCONTINUED | OUTPATIENT
Start: 2022-05-21 | End: 2022-05-23 | Stop reason: HOSPADM

## 2022-05-21 RX ORDER — ONDANSETRON 4 MG/1
4 TABLET, ORALLY DISINTEGRATING ORAL EVERY 8 HOURS PRN
Status: DISCONTINUED | OUTPATIENT
Start: 2022-05-21 | End: 2022-05-23 | Stop reason: HOSPADM

## 2022-05-21 RX ORDER — MEMANTINE HYDROCHLORIDE 10 MG/1
10 TABLET ORAL 2 TIMES DAILY
Status: DISCONTINUED | OUTPATIENT
Start: 2022-05-21 | End: 2022-05-23 | Stop reason: HOSPADM

## 2022-05-21 RX ORDER — DONEPEZIL HYDROCHLORIDE 5 MG/1
10 TABLET, FILM COATED ORAL NIGHTLY
Status: DISCONTINUED | OUTPATIENT
Start: 2022-05-21 | End: 2022-05-23 | Stop reason: HOSPADM

## 2022-05-21 RX ORDER — HYDROCODONE BITARTRATE AND ACETAMINOPHEN 5; 325 MG/1; MG/1
1 TABLET ORAL EVERY 8 HOURS PRN
Status: DISCONTINUED | OUTPATIENT
Start: 2022-05-21 | End: 2022-05-23 | Stop reason: HOSPADM

## 2022-05-21 RX ORDER — GABAPENTIN 300 MG/1
300 CAPSULE ORAL 2 TIMES DAILY
Status: DISCONTINUED | OUTPATIENT
Start: 2022-05-21 | End: 2022-05-23 | Stop reason: HOSPADM

## 2022-05-21 RX ORDER — ACETAMINOPHEN 325 MG/1
650 TABLET ORAL EVERY 6 HOURS PRN
Status: DISCONTINUED | OUTPATIENT
Start: 2022-05-21 | End: 2022-05-23 | Stop reason: HOSPADM

## 2022-05-21 RX ORDER — DULOXETIN HYDROCHLORIDE 60 MG/1
60 CAPSULE, DELAYED RELEASE ORAL DAILY
Status: DISCONTINUED | OUTPATIENT
Start: 2022-05-21 | End: 2022-05-23 | Stop reason: HOSPADM

## 2022-05-21 RX ORDER — AMOXICILLIN 250 MG
2 CAPSULE ORAL NIGHTLY PRN
Status: DISCONTINUED | OUTPATIENT
Start: 2022-05-21 | End: 2022-05-23 | Stop reason: HOSPADM

## 2022-05-21 RX ADMIN — IPRATROPIUM BROMIDE AND ALBUTEROL SULFATE 3 ML: 2.5; .5 SOLUTION RESPIRATORY (INHALATION) at 01:05

## 2022-05-21 RX ADMIN — MEMANTINE 10 MG: 10 TABLET ORAL at 09:05

## 2022-05-21 RX ADMIN — IPRATROPIUM BROMIDE AND ALBUTEROL SULFATE 3 ML: 2.5; .5 SOLUTION RESPIRATORY (INHALATION) at 07:05

## 2022-05-21 RX ADMIN — DONEPEZIL HYDROCHLORIDE 10 MG: 5 TABLET ORAL at 09:05

## 2022-05-21 RX ADMIN — GABAPENTIN 300 MG: 300 CAPSULE ORAL at 01:05

## 2022-05-21 RX ADMIN — PRIMIDONE 50 MG: 50 TABLET ORAL at 09:05

## 2022-05-21 RX ADMIN — HYDROCODONE BITARTRATE AND ACETAMINOPHEN 1 TABLET: 5; 325 TABLET ORAL at 04:05

## 2022-05-21 RX ADMIN — GABAPENTIN 300 MG: 300 CAPSULE ORAL at 09:05

## 2022-05-21 RX ADMIN — PRAVASTATIN SODIUM 40 MG: 40 TABLET ORAL at 09:05

## 2022-05-21 NOTE — PLAN OF CARE
[facility]  Initial Discharge Assessment       Primary Care Provider: Joe Fuller III, MD    Admission Diagnosis: Transient alteration of awareness [R40.4]  Respiratory acidosis [E87.2]  Shaking [R25.1]  Fatigue [R53.83]  Obesity hypoventilation syndrome [E66.2]  Pain of lower extremity, unspecified laterality [M79.606]  Acute on chronic respiratory failure with hypercapnia [J96.22]    Admission Date: 5/20/2022  Expected Discharge Date:          Payor: JACEY / Plan: JACEY / Product Type: Government /     Extended Emergency Contact Information  Primary Emergency Contact: Helga Turkina  Mobile Phone: [phone]  Relation: Daughter  Secondary Emergency Contact: Christelle Yoder  Mobile Phone: [phone]  Relation: [facility] [place] [address] [address]  [facility] [address]  Phone: [phone] Fax: [phone]      Initial Assessment (most recent)     Adult Discharge Assessment - 05/21/22 1116        Discharge Assessment    Assessment Type Discharge Planning Assessment                attempted to reach someone via the patient's room phone and the emergency contact number for the daughter, Sharon, without success ([phone]) to complete the assessment.     Case management will remain available and complete the assessment at a later date.

## 2022-05-21 NOTE — ED PROVIDER NOTES
"EMERGENCY DEPARTMENT HISTORY AND PHYSICAL EXAM          Date: 2022   Patient Name: Martina Betancourt       History of Presenting Illness           Chief Complaint   Patient presents with    Shaking     Pt to ED via EMS - Home health nurse stated that pt has been "shaking and not eating" which typically means that pt has an UTI. Also c/o right leg pain.          History Provided By: Patient and EMS       Martina Betancourt is a 71 y.o. female with PMHX of dementia, NSTEMI, pneumonia, morbid obesity, debility, UTI who presents to the emergency department C/O chills.    Patient brought by EMS because home health nurse states that patient has been shaking and not eating.  They were concerned about a UTI as this is the symptoms she has had before.    Patient is poor historian.  She reports right-sided "sciatica" pain      PCP: Joe Fuller III, MD        Current Facility-Administered Medications   Medication Dose Route Frequency Provider Last Rate Last Admin    albuterol-ipratropium 2.5 mg-0.5 mg/3 mL nebulizer solution 3 mL  3 mL Nebulization Q4H Girma Zheng MD        [START ON 2022] sodium chloride 0.9% flush 10 mL  10 mL Intravenous PRN Girma Zheng MD         Current Outpatient Medications   Medication Sig Dispense Refill    acetaminophen (TYLENOL) 325 MG tablet Take 2 tablets (650 mg total) by mouth every 6 (six) hours as needed (HEADACHE, TEMPERATURE - FEVER > 100.4). (Patient not taking: Reported on 2021)  0    albuterol-ipratropium (DUO-NEB) 2.5 mg-0.5 mg/3 mL nebulizer solution Take 3 mLs by nebulization every 6 (six) hours as needed for Wheezing or Shortness of Breath. Rescue 360 mL 5    aspirin (ECOTRIN) 81 MG EC tablet Take 81 mg by mouth once daily.      donepeziL (ARICEPT) 10 MG tablet Take 1 tablet (10 mg total) by mouth every evening. 30 tablet 3    doxycycline (VIBRA-TABS) 100 MG tablet SMARTSI Tablet(s) By Mouth      DULoxetine (CYMBALTA) 60 MG capsule Take " 1 capsule (60 mg total) by mouth once daily. 30 capsule 3    ferrous sulfate 324 mg (65 mg iron) TbEC Take 1 tablet (324 mg total) by mouth every evening. 30 tablet 2    furosemide (LASIX) 40 MG tablet Take 1 tablet (40 mg total) by mouth once daily. 30 tablet 11    gabapentin (NEURONTIN) 300 MG capsule Take 1 capsule (300 mg total) by mouth 2 (two) times daily. 180 capsule 1    HYDROcodone-acetaminophen (NORCO) 5-325 mg per tablet Take 1 tablet by mouth every 8 (eight) hours as needed for Pain. 90 tablet 0    hydrocortisone 1 % lotion 1 mL DAILY (route: topical)      isosorbide mononitrate (IMDUR) 60 MG 24 hr tablet Take 1 tablet (60 mg total) by mouth once daily. 30 tablet 5    memantine (NAMENDA) 10 MG Tab Take 1 tablet (10 mg total) by mouth 2 (two) times daily. 240 tablet 3    ondansetron (ZOFRAN-ODT) 4 MG TbDL Take 1 tablet (4 mg total) by mouth every 8 (eight) hours as needed. (Patient not taking: Reported on 1/19/2021) 12 tablet 0    pantoprazole (PROTONIX) 40 MG tablet Take 1 tablet (40 mg total) by mouth before breakfast. 30 tablet 11    pravastatin (PRAVACHOL) 40 MG tablet Take 1 tablet (40 mg total) by mouth every evening. 30 tablet 3    primidone (MYSOLINE) 50 MG Tab Take 50 mg by mouth 2 (two) times daily.      senna-docusate 8.6-50 mg (PERICOLACE) 8.6-50 mg per tablet Take 2 tablets by mouth nightly as needed for Constipation.             Past History     Past Medical History:   Past Medical History:   Diagnosis Date    Alzheimer disease     Alzheimer disease     Bronchitis due to Staphylococcus aureus     CAD (coronary artery disease)     Coronary artery disease     Dementia     Discitis     Epidural abscess     HTN (hypertension)     Hydronephrosis     Hyperlipemia     Hyperlipidemia     Hypertension     Mixed hyperlipidemia     MRSA bacteremia     Myopathy     Myopathy     Non-ST elevation (NSTEMI) myocardial infarction     NSTEMI (non-ST elevation myocardial  "infarction)     Obesity     Obesity, unspecified     Old myocardial infarction     Paraplegia     Pneumonia     Pneumonia     Sleep apnea     Sleep apnea, unspecified         Past Surgical History:   Past Surgical History:   Procedure Laterality Date    APPENDECTOMY      BLADDER SUSPENSION      CHOLECYSTECTOMY      CHOLECYSTECTOMY      CORONARY STENT PLACEMENT      HYSTERECTOMY      SURGICAL REMOVAL OF VERTEBRAL BODY OF THORACIC SPINE N/A 2020    Procedure: T9-T10 corpectomy, posterior instrumented fusion T7-T12.;  Surgeon: Everardo Gongora MD;  Location: [facility] OR [address] [place] [address];  Service: Neurosurgery;  Laterality: N/A;    TONSILLECTOMY          Family History:   Family History   Problem Relation Age of Onset    Stroke Mother     Heart failure Mother     Hypertension Father     Heart disease Father     Multiple sclerosis Sister         Social History:   Social History     Tobacco Use    Smoking status: Former Smoker     Types: Cigarettes     Quit date: 2000     Years since quittin.3    Smokeless tobacco: Never Used   Substance Use Topics    Alcohol use: Not Currently    Drug use: Never        Allergies:   Review of patient's allergies indicates:   Allergen Reactions    Hydroxyzine     Tizanidine           Review of Systems   Review of Systems   Unable to perform ROS: Dementia                Physical Exam     Vitals:    22   BP: (!) 156/76    Pulse: 75    Resp: 20    Temp: 98.2 °F (36.8 °C)    SpO2: 96%    Weight:  (!) 163.7 kg (361 lb)   Height:  5' 9" (1.753 m)      Physical Exam  Vitals and nursing note reviewed.   Constitutional:       General: She is not in acute distress.     Appearance: Normal appearance. She is obese. She is not ill-appearing.      Comments: Obese and chronically ill appearing female lying in bed, shaking, eyes held close   HENT:      Head: Normocephalic and atraumatic.      Nose: Nose normal. No congestion or rhinorrhea.      " "Mouth/Throat:      Mouth: Mucous membranes are dry.   Eyes:      Conjunctiva/sclera: Conjunctivae normal.   Cardiovascular:      Rate and Rhythm: Normal rate and regular rhythm.   Pulmonary:      Effort: Pulmonary effort is normal. No respiratory distress.      Breath sounds: Decreased air movement present.   Abdominal:      Palpations: Abdomen is soft.      Tenderness: There is no guarding.   Musculoskeletal:         General: No tenderness or deformity. Normal range of motion.      Cervical back: Normal range of motion.      Right lower leg: Edema present.      Left lower leg: Edema present.   Skin:     General: Skin is warm and dry.      Coloration: Skin is pale.   Neurological:      General: No focal deficit present.      Mental Status: She is alert.      Comments: AAOx2-3, able to state President, Location "[place]", struggles with date              Diagnostic Study Results      Labs -   Recent Results (from the past 12 hour(s))   CBC W/ AUTO DIFFERENTIAL    Collection Time: 05/20/22  8:25 PM   Result Value Ref Range    WBC 6.89 3.90 - 12.70 K/uL    RBC 4.67 4.00 - 5.40 M/uL    Hemoglobin 13.2 12.0 - 16.0 g/dL    Hematocrit 47.5 37.0 - 48.5 %     (H) 82 - 98 fL    MCH 28.3 27.0 - 31.0 pg    MCHC 27.8 (L) 32.0 - 36.0 g/dL    RDW 14.6 (H) 11.5 - 14.5 %    Platelets 222 150 - 450 K/uL    MPV 9.6 9.2 - 12.9 fL    Immature Granulocytes 0.3 0.0 - 0.5 %    Gran # (ANC) 4.3 1.8 - 7.7 K/uL    Immature Grans (Abs) 0.02 0.00 - 0.04 K/uL    Lymph # 1.8 1.0 - 4.8 K/uL    Mono # 0.7 0.3 - 1.0 K/uL    Eos # 0.1 0.0 - 0.5 K/uL    Baso # 0.02 0.00 - 0.20 K/uL    nRBC 0 0 /100 WBC    Gran % 62.2 38.0 - 73.0 %    Lymph % 26.1 18.0 - 48.0 %    Mono % 9.9 4.0 - 15.0 %    Eosinophil % 1.2 0.0 - 8.0 %    Basophil % 0.3 0.0 - 1.9 %    Differential Method Automated    Comp. Metabolic Panel    Collection Time: 05/20/22  8:25 PM   Result Value Ref Range    Sodium 142 136 - 145 mmol/L    Potassium 4.0 3.5 - 5.1 mmol/L    " Chloride 96 95 - 110 mmol/L    CO2 43 (HH) 23 - 29 mmol/L    Glucose 97 70 - 110 mg/dL    BUN 11 8 - 23 mg/dL    Creatinine 0.7 0.5 - 1.4 mg/dL    Calcium 9.1 8.7 - 10.5 mg/dL    Total Protein 7.2 6.0 - 8.4 g/dL    Albumin 3.1 (L) 3.5 - 5.2 g/dL    Total Bilirubin 0.4 0.1 - 1.0 mg/dL    Alkaline Phosphatase 111 55 - 135 U/L    AST 9 (L) 10 - 40 U/L    ALT 12 10 - 44 U/L    Anion Gap 3 (L) 8 - 16 mmol/L    eGFR if African American >60.0 >60 mL/min/1.73 m^2    eGFR if non African American >60.0 >60 mL/min/1.73 m^2   Urinalysis, Reflex to Urine Culture Urine, Clean Catch    Collection Time: 05/20/22  8:45 PM    Specimen: Urine, Catheterized   Result Value Ref Range    Specimen UA Urine, Catheterized     Color, UA Yellow Yellow, Straw, Caro    Appearance, UA Clear Clear    pH, UA 6.0 5.0 - 8.0    Specific Gravity, UA 1.020 1.005 - 1.030    Protein, UA Negative Negative    Glucose, UA Negative Negative    Ketones, UA Negative Negative    Bilirubin (UA) Negative Negative    Occult Blood UA Negative Negative    Nitrite, UA Negative Negative    Urobilinogen, UA 1.0 <2.0 EU/dL    Leukocytes, UA 1+ (A) Negative   Urinalysis Microscopic    Collection Time: 05/20/22  8:45 PM   Result Value Ref Range    RBC, UA 4 0 - 4 /hpf    WBC, UA 25 (H) 0 - 5 /hpf    Bacteria Negative None-Occ /hpf    Squam Epithel, UA 3 /hpf    Hyaline Casts, UA 8.2 (A) 0-1/lpf /lpf    Microscopic Comment SEE COMMENT    POCT ARTERIAL BLOOD GAS    Collection Time: 05/20/22  9:57 PM   Result Value Ref Range    POC PH 7.347 7.345 - 7.450    POC PCO2 84.9 (HH) 35.0 - 45.0 mmHg    POC PO2 53.8 (LL) 80.0 - 100 mmHg    POC SATURATED O2 89.4 %    Correct Temperature (PH) 7.347     Corrected Temperature (pCO2) 84.9 mmHg    Corrected Temperature (pO2) 53.8 mmHg    POC HCO3 40.2 mmol/l    Base Deficit 20.8 mmol/l    POC TCO2 42.2 mmol/l    POC Temp 37.0 C    Specimen source Arterial     Performed By: Enriqueta     MODIFIED REAGAN'S TEST POS     FiO2 28.0 %    O2DEVICE  Nasal Cannula     LPM 2.0     Provider Notified: ERIC BARON     Notified Time 05/20/2022 21:58     Notified By Enriqueta     Notified Note Called and read back by ERIC BARON         Radiologic Studies -    X-Ray Chest 1 View    (Results Pending)        Medications given in the ED-   Medications   sodium chloride 0.9% flush 10 mL (has no administration in time range)   albuterol-ipratropium 2.5 mg-0.5 mg/3 mL nebulizer solution 3 mL (has no administration in time range)           Medical Decision Making    I am the first provider for this patient.     I reviewed the vital signs, available nursing notes, past medical history, past surgical history, family history and social history.     Vital Signs:  Reviewed the patient's vital signs.     Pulse Oximetry Analysis and Interpretation:    96% on NC, normal      CXR  interpretation: (Preliminary)   CXR read by Dr. Girma Zheng     Cardiomegaly, poor inspiration, no focal infiltrate, similar to prior study    Records Reviewed: Old medical records.  Nursing notes.  Previous radiology studies.        Provider Notes (Medical Decision Making): Martina Betancourt is a 71 y.o. female here for reported shaking and altered mental status.    Patient poor historian.  Answer some questions appropriately however has waxing waning mental status.  She has a history of dementia.    Chest x-ray grossly unchanged from prior study.  Labs overall benign aside from elevated CO2.  Urine unremarkable.  Subsequent ABG reveals pH is 7.34 with pCO2 84.9 with a partially compensated respiratory acidosis    Patient's history of hypercapnia, hypoventilation, and is reportedly on positive pressure ventilation at night.  Is unclear she has been using it.    She was admitted earlier this month for acute on chronic respiratory failure    Patient was placed on BiPAP which she is tolerating well.  Will start bronchodilator therapy as well.    Procedures:   Procedures      ED Course:    CONSULT NOTE:    11:23  PM      Dr. Zheng discussed care with?Dr Fuller, Hospitalist   It was a standard discussion,?including history of patients chief complaint, available diagnostic results, and treatment course.?Discussed case. Agrees with admission                   Diagnosis and Disposition     11:26 PM     I have spent 24 minutes of critical care time involved in lab review, consultations with specialist, family decision-making, and documentation.  During this entire length of time I was immediately available to the patient.     Critical Care:  The reason for providing this level of medical care for this critically ill patient was due a critical illness that impaired one or more vital organ systems such that there was a high probability of imminent or life threatening deterioration in the patients condition. This care involved high complexity decision making to assess, manipulate, and support vital system functions, to treat this degreee vital organ system failure and to prevent further life threatening deterioration of the patients condition.          CLINICAL IMPRESSION:         1. Acute on chronic respiratory failure with hypercapnia    2. Fatigue    3. Pain of lower extremity, unspecified laterality    4. Obesity hypoventilation syndrome    5. Transient alteration of awareness    6. Respiratory acidosis              PLAN:   1. Admit  2.      Medication List      ASK your doctor about these medications    acetaminophen 325 MG tablet  Commonly known as: TYLENOL  Take 2 tablets (650 mg total) by mouth every 6 (six) hours as needed (HEADACHE, TEMPERATURE - FEVER > 100.4).     albuterol-ipratropium 2.5 mg-0.5 mg/3 mL nebulizer solution  Commonly known as: DUO-NEB  Take 3 mLs by nebulization every 6 (six) hours as needed for Wheezing or Shortness of Breath. Rescue     aluminum & magnesium hydroxide-simethicone 400-400-40 mg/5 mL suspension  Commonly known as: MYLANTA MAX STRENGTH  Take 30 mLs by mouth every 6 (six) hours as needed  for Indigestion.     aspirin 81 MG EC tablet  Commonly known as: ECOTRIN     clopidogreL 75 mg tablet  Commonly known as: PLAVIX  Take 1 tablet (75 mg total) by mouth once daily.     donepeziL 10 MG tablet  Commonly known as: ARICEPT  Take 1 tablet (10 mg total) by mouth every evening.     doxycycline 100 MG tablet  Commonly known as: VIBRA-TABS     DULoxetine 60 MG capsule  Commonly known as: CYMBALTA  Take 1 capsule (60 mg total) by mouth once daily.     ferrous sulfate 324 mg (65 mg iron) Tbec  Take 1 tablet (324 mg total) by mouth every evening.     furosemide 40 MG tablet  Commonly known as: LASIX  Take 1 tablet (40 mg total) by mouth once daily.     gabapentin 300 MG capsule  Commonly known as: NEURONTIN  Take 1 capsule (300 mg total) by mouth 2 (two) times daily.     HYDROcodone-acetaminophen 5-325 mg per tablet  Commonly known as: NORCO  Take 1 tablet by mouth every 8 (eight) hours as needed for Pain.     hydrocortisone 1 % lotion     isosorbide mononitrate 60 MG 24 hr tablet  Commonly known as: IMDUR  Take 1 tablet (60 mg total) by mouth once daily.     memantine 10 MG Tab  Commonly known as: NAMENDA  Take 1 tablet (10 mg total) by mouth 2 (two) times daily.     miconazole NITRATE 2 % 2 % top powder  Commonly known as: MICOTIN  Apply topically 2 (two) times daily. for 15 days     ondansetron 4 MG Tbdl  Commonly known as: ZOFRAN-ODT  Take 1 tablet (4 mg total) by mouth every 8 (eight) hours as needed.     pantoprazole 40 MG tablet  Commonly known as: PROTONIX  Take 1 tablet (40 mg total) by mouth before breakfast.     pravastatin 40 MG tablet  Commonly known as: PRAVACHOL  Take 1 tablet (40 mg total) by mouth every evening.     primidone 50 MG Tab  Commonly known as: MYSOLINE     senna-docusate 8.6-50 mg 8.6-50 mg per tablet  Commonly known as: PERICOLACE  Take 2 tablets by mouth nightly as needed for Constipation.           3. Joe Fuller III, MD  [address] [facility]  [address]  [phone]    Schedule an appointment as soon as possible for a visit   Primary care follow up    [facility] Emergency Department  [address] [place] [address] [id number] [phone]  Go to   If symptoms worsen       _______________________________     Please note that this dictation was completed with Selv[employer], the computer voice recognition software.  Quite often unanticipated grammatical, syntax, homophones, and other interpretive errors are inadvertently transcribed by the computer software.  Please disregard these errors.  Please excuse any errors that have escaped final proofreading.             Girma Zheng MD  05/20/22 [id number]

## 2022-05-21 NOTE — CARE UPDATE
Transported pt to 638 from ER. LORAINE Shipman present. Transported on 3L NC. No distress. Placed pt back onto bipap once arrived in room.

## 2022-05-21 NOTE — H&P
"[facility] Medicine  History & Physical    Patient Name: Martina Betancourt  MRN: [id number]  Patient Class: IP- Inpatient  Admission Date: 5/20/2022  Attending Physician: Joe Fuller III, MD  Primary Care Provider: Joe Fuller III, MD         Patient information was obtained from relative(s), past medical records and ER records.     Subjective:     Principal Problem:Acute on chronic respiratory failure with hypoxia and hypercapnia    Chief Complaint:   Chief Complaint   Patient presents with    Shaking     Pt to ED via EMS - Home health nurse stated that pt has been "shaking and not eating" which typically means that pt has an UTI. Also c/o right leg pain.         HPI: ED HPI:  Martina Betancourt is a 71 y.o. female with PMHX of dementia, NSTEMI, pneumonia, morbid obesity, debility, UTI who presents to the emergency department C/O chills.     Patient brought by EMS because home health nurse states that patient has been shaking and not eating.  They were concerned about a UTI as this is the symptoms she has had before.     Patient is poor historian.  She reports right-sided "sciatica" pain        IM HPI:  Patient is well known to me and has a history of obesity hypoventilation syndrome with chronic hypercapnic respiratory failure.  I spoken to the daughter and the patient has had a recent admission to our acute care facility for hypercapnia and she was placed on a noninvasive ventilator at home and has been wearing q.h.s..  She now returns back to our facility for a similar event with pCO2 in the 80s.  The daughter thinks that during the day she is under ventilating and may need the machine continuously.  We discussed quality of life having to maintain on BiPAP continuously and I have discussed hospice care and end of life care with the daughter.      Past Medical History:   Diagnosis Date    Alzheimer disease     Alzheimer disease     Bronchitis due to Staphylococcus aureus     CAD (coronary " artery disease)     Coronary artery disease     Dementia     Discitis     Epidural abscess     HTN (hypertension)     Hydronephrosis     Hyperlipemia     Hyperlipidemia     Hypertension     Mixed hyperlipidemia     MRSA bacteremia     Myopathy     Myopathy     Non-ST elevation (NSTEMI) myocardial infarction     NSTEMI (non-ST elevation myocardial infarction)     Obesity     Obesity, unspecified     Old myocardial infarction     Paraplegia     Pneumonia     Pneumonia     Sleep apnea     Sleep apnea, unspecified        Past Surgical History:   Procedure Laterality Date    APPENDECTOMY      BLADDER SUSPENSION      CHOLECYSTECTOMY      CHOLECYSTECTOMY      CORONARY STENT PLACEMENT      HYSTERECTOMY      SURGICAL REMOVAL OF VERTEBRAL BODY OF THORACIC SPINE N/A 2020    Procedure: T9-T10 corpectomy, posterior instrumented fusion T7-T12.;  Surgeon: Everardo Gongora MD;  Location: [facility] OR [address] [place] [address];  Service: Neurosurgery;  Laterality: N/A;    TONSILLECTOMY         Review of patient's allergies indicates:   Allergen Reactions    Hydroxyzine     Tizanidine        No current facility-administered medications on file prior to encounter.     Current Outpatient Medications on File Prior to Encounter   Medication Sig    acetaminophen (TYLENOL) 325 MG tablet Take 2 tablets (650 mg total) by mouth every 6 (six) hours as needed (HEADACHE, TEMPERATURE - FEVER > 100.4). (Patient not taking: Reported on 2021)    albuterol-ipratropium (DUO-NEB) 2.5 mg-0.5 mg/3 mL nebulizer solution Take 3 mLs by nebulization every 6 (six) hours as needed for Wheezing or Shortness of Breath. Rescue    aspirin (ECOTRIN) 81 MG EC tablet Take 81 mg by mouth once daily.    donepeziL (ARICEPT) 10 MG tablet Take 1 tablet (10 mg total) by mouth every evening.    doxycycline (VIBRA-TABS) 100 MG tablet SMARTSI Tablet(s) By Mouth    DULoxetine (CYMBALTA) 60 MG capsule Take 1 capsule (60 mg total) by mouth once daily.     ferrous sulfate 324 mg (65 mg iron) TbEC Take 1 tablet (324 mg total) by mouth every evening.    furosemide (LASIX) 40 MG tablet Take 1 tablet (40 mg total) by mouth once daily.    gabapentin (NEURONTIN) 300 MG capsule Take 1 capsule (300 mg total) by mouth 2 (two) times daily.    HYDROcodone-acetaminophen (NORCO) 5-325 mg per tablet Take 1 tablet by mouth every 8 (eight) hours as needed for Pain.    hydrocortisone 1 % lotion 1 mL DAILY (route: topical)    isosorbide mononitrate (IMDUR) 60 MG 24 hr tablet Take 1 tablet (60 mg total) by mouth once daily.    memantine (NAMENDA) 10 MG Tab Take 1 tablet (10 mg total) by mouth 2 (two) times daily.    ondansetron (ZOFRAN-ODT) 4 MG TbDL Take 1 tablet (4 mg total) by mouth every 8 (eight) hours as needed. (Patient not taking: Reported on 1/19/2021)    pantoprazole (PROTONIX) 40 MG tablet Take 1 tablet (40 mg total) by mouth before breakfast.    pravastatin (PRAVACHOL) 40 MG tablet Take 1 tablet (40 mg total) by mouth every evening.    primidone (MYSOLINE) 50 MG Tab Take 50 mg by mouth 2 (two) times daily.    senna-docusate 8.6-50 mg (PERICOLACE) 8.6-50 mg per tablet Take 2 tablets by mouth nightly as needed for Constipation.    [DISCONTINUED] aluminum & magnesium hydroxide-simethicone (MYLANTA MAX STRENGTH) 400-400-40 mg/5 mL suspension Take 30 mLs by mouth every 6 (six) hours as needed for Indigestion. (Patient not taking: Reported on 1/19/2021)    [DISCONTINUED] clopidogreL (PLAVIX) 75 mg tablet Take 1 tablet (75 mg total) by mouth once daily.    [DISCONTINUED] miconazole NITRATE 2 % (MICOTIN) 2 % top powder Apply topically 2 (two) times daily. for 15 days     Family History       Problem Relation (Age of Onset)    Heart disease Father    Heart failure Mother    Hypertension Father    Multiple sclerosis Sister    Stroke Mother          Tobacco Use    Smoking status: Former Smoker     Types: Cigarettes     Quit date: 1/1/2000     Years since quitting:  22.4    Smokeless tobacco: Never Used   Substance and Sexual Activity    Alcohol use: Not Currently    Drug use: Never    Sexual activity: Not Currently     Review of Systems   Unable to perform ROS: Dementia   Objective:     Vital Signs (Most Recent):  Temp: 98.3 °F (36.8 °C) (05/21/22 0824)  Pulse: 60 (05/21/22 0824)  Resp: 14 (05/21/22 0824)  BP: (!) 142/65 (05/21/22 0824)  SpO2: 95 % (05/21/22 0824)   Vital Signs (24h Range):  Temp:  [98.2 °F (36.8 °C)-99.4 °F (37.4 °C)] 98.3 °F (36.8 °C)  Pulse:  [60-75] 60  Resp:  [14-24] 14  SpO2:  [90 %-99 %] 95 %  BP: (114-156)/(59-88) 142/65     Weight: (!) 158.1 kg (348 lb 8 oz)  Body mass index is 51.46 kg/m².    Physical Exam  Vitals and nursing note reviewed.   Constitutional:       General: She is not in acute distress.     Appearance: She is obese. She is ill-appearing.   HENT:      Head: Normocephalic and atraumatic.      Nose: Nose normal. No congestion or rhinorrhea.      Mouth/Throat:      Mouth: Mucous membranes are moist.      Pharynx: No oropharyngeal exudate.   Eyes:      General: No scleral icterus.  Neck:      Vascular: No carotid bruit.   Cardiovascular:      Rate and Rhythm: Normal rate and regular rhythm.      Heart sounds: Normal heart sounds. No murmur heard.    No friction rub. No gallop.   Pulmonary:      Effort: No respiratory distress.      Breath sounds: Normal breath sounds. No stridor. No wheezing, rhonchi or rales.   Chest:      Chest wall: No tenderness.   Abdominal:      General: Bowel sounds are normal. There is no distension.      Palpations: Abdomen is soft. There is no mass.      Tenderness: There is no abdominal tenderness. There is no right CVA tenderness, left CVA tenderness, guarding or rebound.      Hernia: No hernia is present.   Musculoskeletal:         General: No swelling, tenderness or deformity.      Cervical back: Neck supple. No rigidity.   Lymphadenopathy:      Cervical: No cervical adenopathy.   Skin:     Coloration:  Skin is not jaundiced.      Findings: No rash.   Neurological:      Sensory: No sensory deficit.      Motor: Weakness present.      Coordination: Coordination normal.   Psychiatric:      Comments: Dementia             Significant Labs: All pertinent labs within the past 24 hours have been reviewed.    Significant Imaging: I have reviewed all pertinent imaging results/findings within the past 24 hours.    Assessment/Plan:     * Acute on chronic respiratory failure with hypoxia and hypercapnia  Continuous noninvasive ventilation versus hospice care.  Daughter will meet with patient today.  Patient has underlying dementia.    AMS (altered mental status)        Bedbound        Severe obesity (BMI >= 40)  Body mass index is 51.46 kg/m². Morbid obesity complicates all aspects of disease management from diagnostic modalities to treatment. Weight loss encouraged and health benefits explained to patient.         Dementia without behavioral disturbance          VTE Risk Mitigation (From admission, onward)         Ordered     IP VTE HIGH RISK PATIENT  Once         05/20/22 2322     Place sequential compression device  Until discontinued         05/20/22 2322                   Joe Fuller III, MD  Department of Hospital Medicine   [facility]

## 2022-05-21 NOTE — HPI
"ED HPI:  Martina Betancourt is a 71 y.o. female with PMHX of dementia, NSTEMI, pneumonia, morbid obesity, debility, UTI who presents to the emergency department C/O chills.     Patient brought by EMS because home health nurse states that patient has been shaking and not eating.  They were concerned about a UTI as this is the symptoms she has had before.     Patient is poor historian.  She reports right-sided "sciatica" pain        IM HPI:  Patient is well known to me and has a history of obesity hypoventilation syndrome with chronic hypercapnic respiratory failure.  I spoken to the daughter and the patient has had a recent admission to our acute care facility for hypercapnia and she was placed on a noninvasive ventilator at home and has been wearing q.h.s..  She now returns back to our facility for a similar event with pCO2 in the 80s.  The daughter thinks that during the day she is under ventilating and may need the machine continuously.  We discussed quality of life having to maintain on BiPAP continuously and I have discussed hospice care and end of life care with the daughter.  "

## 2022-05-21 NOTE — SUBJECTIVE & OBJECTIVE
Past Medical History:   Diagnosis Date    Alzheimer disease     Alzheimer disease     Bronchitis due to Staphylococcus aureus     CAD (coronary artery disease)     Coronary artery disease     Dementia     Discitis     Epidural abscess     HTN (hypertension)     Hydronephrosis     Hyperlipemia     Hyperlipidemia     Hypertension     Mixed hyperlipidemia     MRSA bacteremia     Myopathy     Myopathy     Non-ST elevation (NSTEMI) myocardial infarction     NSTEMI (non-ST elevation myocardial infarction)     Obesity     Obesity, unspecified     Old myocardial infarction     Paraplegia     Pneumonia     Pneumonia     Sleep apnea     Sleep apnea, unspecified        Past Surgical History:   Procedure Laterality Date    APPENDECTOMY      BLADDER SUSPENSION      CHOLECYSTECTOMY      CHOLECYSTECTOMY      CORONARY STENT PLACEMENT      HYSTERECTOMY      SURGICAL REMOVAL OF VERTEBRAL BODY OF THORACIC SPINE N/A 2020    Procedure: T9-T10 corpectomy, posterior instrumented fusion T7-T12.;  Surgeon: Everardo Gongora MD;  Location: [facility] OR [address] [place] [address];  Service: Neurosurgery;  Laterality: N/A;    TONSILLECTOMY         Review of patient's allergies indicates:   Allergen Reactions    Hydroxyzine     Tizanidine        No current facility-administered medications on file prior to encounter.     Current Outpatient Medications on File Prior to Encounter   Medication Sig    acetaminophen (TYLENOL) 325 MG tablet Take 2 tablets (650 mg total) by mouth every 6 (six) hours as needed (HEADACHE, TEMPERATURE - FEVER > 100.4). (Patient not taking: Reported on 2021)    albuterol-ipratropium (DUO-NEB) 2.5 mg-0.5 mg/3 mL nebulizer solution Take 3 mLs by nebulization every 6 (six) hours as needed for Wheezing or Shortness of Breath. Rescue    aspirin (ECOTRIN) 81 MG EC tablet Take 81 mg by mouth once daily.    donepeziL (ARICEPT) 10 MG tablet Take 1 tablet (10 mg total) by mouth every evening.    doxycycline (VIBRA-TABS) 100 MG tablet SMARTSI  Tablet(s) By Mouth    DULoxetine (CYMBALTA) 60 MG capsule Take 1 capsule (60 mg total) by mouth once daily.    ferrous sulfate 324 mg (65 mg iron) TbEC Take 1 tablet (324 mg total) by mouth every evening.    furosemide (LASIX) 40 MG tablet Take 1 tablet (40 mg total) by mouth once daily.    gabapentin (NEURONTIN) 300 MG capsule Take 1 capsule (300 mg total) by mouth 2 (two) times daily.    HYDROcodone-acetaminophen (NORCO) 5-325 mg per tablet Take 1 tablet by mouth every 8 (eight) hours as needed for Pain.    hydrocortisone 1 % lotion 1 mL DAILY (route: topical)    isosorbide mononitrate (IMDUR) 60 MG 24 hr tablet Take 1 tablet (60 mg total) by mouth once daily.    memantine (NAMENDA) 10 MG Tab Take 1 tablet (10 mg total) by mouth 2 (two) times daily.    ondansetron (ZOFRAN-ODT) 4 MG TbDL Take 1 tablet (4 mg total) by mouth every 8 (eight) hours as needed. (Patient not taking: Reported on 1/19/2021)    pantoprazole (PROTONIX) 40 MG tablet Take 1 tablet (40 mg total) by mouth before breakfast.    pravastatin (PRAVACHOL) 40 MG tablet Take 1 tablet (40 mg total) by mouth every evening.    primidone (MYSOLINE) 50 MG Tab Take 50 mg by mouth 2 (two) times daily.    senna-docusate 8.6-50 mg (PERICOLACE) 8.6-50 mg per tablet Take 2 tablets by mouth nightly as needed for Constipation.    [DISCONTINUED] aluminum & magnesium hydroxide-simethicone (MYLANTA MAX STRENGTH) 400-400-40 mg/5 mL suspension Take 30 mLs by mouth every 6 (six) hours as needed for Indigestion. (Patient not taking: Reported on 1/19/2021)    [DISCONTINUED] clopidogreL (PLAVIX) 75 mg tablet Take 1 tablet (75 mg total) by mouth once daily.    [DISCONTINUED] miconazole NITRATE 2 % (MICOTIN) 2 % top powder Apply topically 2 (two) times daily. for 15 days     Family History       Problem Relation (Age of Onset)    Heart disease Father    Heart failure Mother    Hypertension Father    Multiple sclerosis Sister    Stroke Mother          Tobacco Use    Smoking  status: Former Smoker     Types: Cigarettes     Quit date: 2000     Years since quittin.4    Smokeless tobacco: Never Used   Substance and Sexual Activity    Alcohol use: Not Currently    Drug use: Never    Sexual activity: Not Currently     Review of Systems   Unable to perform ROS: Dementia   Objective:     Vital Signs (Most Recent):  Temp: 98.3 °F (36.8 °C) (22 08)  Pulse: 60 (22 08)  Resp: 14 (22)  BP: (!) 142/65 (22)  SpO2: 95 % (22)   Vital Signs (24h Range):  Temp:  [98.2 °F (36.8 °C)-99.4 °F (37.4 °C)] 98.3 °F (36.8 °C)  Pulse:  [60-75] 60  Resp:  [14-24] 14  SpO2:  [90 %-99 %] 95 %  BP: (114-156)/(59-88) 142/65     Weight: (!) 158.1 kg (348 lb 8 oz)  Body mass index is 51.46 kg/m².    Physical Exam  Vitals and nursing note reviewed.   Constitutional:       General: She is not in acute distress.     Appearance: She is obese. She is ill-appearing.   HENT:      Head: Normocephalic and atraumatic.      Nose: Nose normal. No congestion or rhinorrhea.      Mouth/Throat:      Mouth: Mucous membranes are moist.      Pharynx: No oropharyngeal exudate.   Eyes:      General: No scleral icterus.  Neck:      Vascular: No carotid bruit.   Cardiovascular:      Rate and Rhythm: Normal rate and regular rhythm.      Heart sounds: Normal heart sounds. No murmur heard.    No friction rub. No gallop.   Pulmonary:      Effort: No respiratory distress.      Breath sounds: Normal breath sounds. No stridor. No wheezing, rhonchi or rales.   Chest:      Chest wall: No tenderness.   Abdominal:      General: Bowel sounds are normal. There is no distension.      Palpations: Abdomen is soft. There is no mass.      Tenderness: There is no abdominal tenderness. There is no right CVA tenderness, left CVA tenderness, guarding or rebound.      Hernia: No hernia is present.   Musculoskeletal:         General: No swelling, tenderness or deformity.      Cervical back: Neck supple. No  rigidity.   Lymphadenopathy:      Cervical: No cervical adenopathy.   Skin:     Coloration: Skin is not jaundiced.      Findings: No rash.   Neurological:      Sensory: No sensory deficit.      Motor: Weakness present.      Coordination: Coordination normal.   Psychiatric:      Comments: Dementia             Significant Labs: All pertinent labs within the past 24 hours have been reviewed.    Significant Imaging: I have reviewed all pertinent imaging results/findings within the past 24 hours.

## 2022-05-21 NOTE — ASSESSMENT & PLAN NOTE
Continuous noninvasive ventilation versus hospice care.  Daughter will meet with patient today.  Patient has underlying dementia.

## 2022-05-21 NOTE — ASSESSMENT & PLAN NOTE
Body mass index is 51.46 kg/m². Morbid obesity complicates all aspects of disease management from diagnostic modalities to treatment. Weight loss encouraged and health benefits explained to patient.

## 2022-05-22 LAB
ALBUMIN SERPL BCP-MCNC: 2.6 G/DL (ref 3.5–5.2)
ALP SERPL-CCNC: 88 U/L (ref 55–135)
ALT SERPL W/O P-5'-P-CCNC: 12 U/L (ref 10–44)
ANION GAP SERPL CALC-SCNC: 3 MMOL/L (ref 8–16)
AST SERPL-CCNC: 10 U/L (ref 10–40)
BACTERIA UR CULT: NO GROWTH
BASOPHILS # BLD AUTO: 0.02 K/UL (ref 0–0.2)
BASOPHILS NFR BLD: 0.3 % (ref 0–1.9)
BILIRUB SERPL-MCNC: 0.7 MG/DL (ref 0.1–1)
BUN SERPL-MCNC: 14 MG/DL (ref 8–23)
CALCIUM SERPL-MCNC: 9.5 MG/DL (ref 8.7–10.5)
CHLORIDE SERPL-SCNC: 98 MMOL/L (ref 95–110)
CO2 SERPL-SCNC: 40 MMOL/L (ref 23–29)
CREAT SERPL-MCNC: 0.7 MG/DL (ref 0.5–1.4)
DIFFERENTIAL METHOD: ABNORMAL
EOSINOPHIL # BLD AUTO: 0.1 K/UL (ref 0–0.5)
EOSINOPHIL NFR BLD: 1.4 % (ref 0–8)
ERYTHROCYTE [DISTWIDTH] IN BLOOD BY AUTOMATED COUNT: 14.4 % (ref 11.5–14.5)
EST. GFR  (AFRICAN AMERICAN): >60 ML/MIN/1.73 M^2
EST. GFR  (NON AFRICAN AMERICAN): >60 ML/MIN/1.73 M^2
GLUCOSE SERPL-MCNC: 76 MG/DL (ref 70–110)
HCT VFR BLD AUTO: 42.9 % (ref 37–48.5)
HGB BLD-MCNC: 12.1 G/DL (ref 12–16)
IMM GRANULOCYTES # BLD AUTO: 0.01 K/UL (ref 0–0.04)
IMM GRANULOCYTES NFR BLD AUTO: 0.2 % (ref 0–0.5)
LYMPHOCYTES # BLD AUTO: 1.8 K/UL (ref 1–4.8)
LYMPHOCYTES NFR BLD: 27 % (ref 18–48)
MCH RBC QN AUTO: 27.8 PG (ref 27–31)
MCHC RBC AUTO-ENTMCNC: 28.2 G/DL (ref 32–36)
MCV RBC AUTO: 99 FL (ref 82–98)
MONOCYTES # BLD AUTO: 0.8 K/UL (ref 0.3–1)
MONOCYTES NFR BLD: 12.4 % (ref 4–15)
NEUTROPHILS # BLD AUTO: 3.9 K/UL (ref 1.8–7.7)
NEUTROPHILS NFR BLD: 58.7 % (ref 38–73)
NRBC BLD-RTO: 0 /100 WBC
PLATELET # BLD AUTO: 186 K/UL (ref 150–450)
PMV BLD AUTO: 10.2 FL (ref 9.2–12.9)
POTASSIUM SERPL-SCNC: 4 MMOL/L (ref 3.5–5.1)
PROT SERPL-MCNC: 6.1 G/DL (ref 6–8.4)
RBC # BLD AUTO: 4.35 M/UL (ref 4–5.4)
SODIUM SERPL-SCNC: 141 MMOL/L (ref 136–145)
WBC # BLD AUTO: 6.63 K/UL (ref 3.9–12.7)

## 2022-05-22 PROCEDURE — 94660 CPAP INITIATION&MGMT: CPT

## 2022-05-22 PROCEDURE — 99900035 HC TECH TIME PER 15 MIN (STAT)

## 2022-05-22 PROCEDURE — 85025 COMPLETE CBC W/AUTO DIFF WBC: CPT | Performed by: INTERNAL MEDICINE

## 2022-05-22 PROCEDURE — 25000003 PHARM REV CODE 250: Performed by: INTERNAL MEDICINE

## 2022-05-22 PROCEDURE — 36415 COLL VENOUS BLD VENIPUNCTURE: CPT | Performed by: INTERNAL MEDICINE

## 2022-05-22 PROCEDURE — 11000001 HC ACUTE MED/SURG PRIVATE ROOM

## 2022-05-22 PROCEDURE — 80053 COMPREHEN METABOLIC PANEL: CPT | Performed by: INTERNAL MEDICINE

## 2022-05-22 PROCEDURE — 27000221 HC OXYGEN, UP TO 24 HOURS

## 2022-05-22 PROCEDURE — 27000190 HC CPAP FULL FACE MASK W/VALVE

## 2022-05-22 PROCEDURE — 94761 N-INVAS EAR/PLS OXIMETRY MLT: CPT

## 2022-05-22 PROCEDURE — 94640 AIRWAY INHALATION TREATMENT: CPT

## 2022-05-22 PROCEDURE — 99900031 HC PATIENT EDUCATION (STAT)

## 2022-05-22 PROCEDURE — 25000242 PHARM REV CODE 250 ALT 637 W/ HCPCS: Performed by: EMERGENCY MEDICINE

## 2022-05-22 RX ORDER — GABAPENTIN 300 MG/1
300 CAPSULE ORAL ONCE
Status: COMPLETED | OUTPATIENT
Start: 2022-05-22 | End: 2022-05-23

## 2022-05-22 RX ADMIN — DULOXETINE HYDROCHLORIDE 60 MG: 60 CAPSULE, DELAYED RELEASE ORAL at 09:05

## 2022-05-22 RX ADMIN — GABAPENTIN 300 MG: 300 CAPSULE ORAL at 09:05

## 2022-05-22 RX ADMIN — PRIMIDONE 50 MG: 50 TABLET ORAL at 09:05

## 2022-05-22 RX ADMIN — MEMANTINE 10 MG: 10 TABLET ORAL at 09:05

## 2022-05-22 RX ADMIN — HYDROCODONE BITARTRATE AND ACETAMINOPHEN 1 TABLET: 5; 325 TABLET ORAL at 08:05

## 2022-05-22 RX ADMIN — GABAPENTIN 300 MG: 300 CAPSULE ORAL at 08:05

## 2022-05-22 RX ADMIN — ASPIRIN 81 MG: 81 TABLET, COATED ORAL at 09:05

## 2022-05-22 RX ADMIN — IPRATROPIUM BROMIDE AND ALBUTEROL SULFATE 3 ML: 2.5; .5 SOLUTION RESPIRATORY (INHALATION) at 07:05

## 2022-05-22 RX ADMIN — PANTOPRAZOLE SODIUM 40 MG: 40 TABLET, DELAYED RELEASE ORAL at 06:05

## 2022-05-22 RX ADMIN — IPRATROPIUM BROMIDE AND ALBUTEROL SULFATE 3 ML: 2.5; .5 SOLUTION RESPIRATORY (INHALATION) at 01:05

## 2022-05-22 RX ADMIN — MEMANTINE 10 MG: 10 TABLET ORAL at 08:05

## 2022-05-22 RX ADMIN — PRAVASTATIN SODIUM 40 MG: 40 TABLET ORAL at 08:05

## 2022-05-22 RX ADMIN — HYDROCODONE BITARTRATE AND ACETAMINOPHEN 1 TABLET: 5; 325 TABLET ORAL at 04:05

## 2022-05-22 RX ADMIN — DONEPEZIL HYDROCHLORIDE 10 MG: 5 TABLET ORAL at 08:05

## 2022-05-22 RX ADMIN — ISOSORBIDE MONONITRATE 60 MG: 30 TABLET, EXTENDED RELEASE ORAL at 09:05

## 2022-05-22 RX ADMIN — HYDROCODONE BITARTRATE AND ACETAMINOPHEN 1 TABLET: 5; 325 TABLET ORAL at 12:05

## 2022-05-22 NOTE — CARE UPDATE
Removed pt from Bipap and placed pt on 5 L NC due to pt complaining of face hurting from the mask and to allow pt a break from the mask. Pt sats remaining 95-97% on cannula. Will continue to monitor pt. Nurse aware.

## 2022-05-22 NOTE — ASSESSMENT & PLAN NOTE
Continuous noninvasive ventilation versus hospice care.  Daughter will meet with patient today.  Patient has underlying dementia.  Family is electing hospice care will consult today and once home is prepared can be DCd.

## 2022-05-22 NOTE — PLAN OF CARE
05/22/22 1043   Medicare Message   Important Message from Medicare regarding Discharge Appeal Rights Given to patient/caregiver;Explained to patient/caregiver;Signed/date by patient/caregiver  (Signed by daughter at the bedside.)   Date IMM was signed 05/22/22   Time IMM was signed 1042       Spoke to the patient and her daughter at the bedside about the plan for hospice at home. They agree with this plan and have talked with Dr. Fuller about it. They would like some time deciding on a hospice company. List given. Patient and daughter aware of discharge tomorrow.  to follow up in the AM.

## 2022-05-22 NOTE — PLAN OF CARE
Pt asleep most of shift, using BiPAP and sleeping soundly. Pt educated while administering care on importance of keeping oxygen saturation above 90%. Pt nods in agreement of information given. Will continue to monitor and document any changes.

## 2022-05-22 NOTE — HOSPITAL COURSE
5/22/2022 FM:  Patient is awake and alert today.  Patient's daughter is at bedside.  The patient and her daughter had a long conversation and they feel that having to wear a noninvasive ventilator continuously is no quality of life and therefore they are requesting hospice care at home.  They understand that this means not returning back to the hospital for respiratory issues.  The patient has had a long douglas over the last few years with medical illnesses and a decline.  I have also discussed tracheostomy and they are not interested.  Will refer to hospice for chronic respiratory failure.    Discharge Note:  Patient and her daughter have agreed to hospice care.  Hospice is evaluating and will discharge home today and they can prepare the house and all necessary supplies and equipment.

## 2022-05-22 NOTE — SUBJECTIVE & OBJECTIVE
Past Medical History:   Diagnosis Date    Alzheimer disease     Alzheimer disease     Bronchitis due to Staphylococcus aureus     CAD (coronary artery disease)     Coronary artery disease     Dementia     Discitis     Epidural abscess     HTN (hypertension)     Hydronephrosis     Hyperlipemia     Hyperlipidemia     Hypertension     Mixed hyperlipidemia     MRSA bacteremia     Myopathy     Myopathy     Non-ST elevation (NSTEMI) myocardial infarction     NSTEMI (non-ST elevation myocardial infarction)     Obesity     Obesity, unspecified     Old myocardial infarction     Paraplegia     Pneumonia     Pneumonia     Sleep apnea     Sleep apnea, unspecified        Past Surgical History:   Procedure Laterality Date    APPENDECTOMY      BLADDER SUSPENSION      CHOLECYSTECTOMY      CHOLECYSTECTOMY      CORONARY STENT PLACEMENT      HYSTERECTOMY      SURGICAL REMOVAL OF VERTEBRAL BODY OF THORACIC SPINE N/A 2020    Procedure: T9-T10 corpectomy, posterior instrumented fusion T7-T12.;  Surgeon: Everardo Gongora MD;  Location: [facility] OR [address] [place] [address];  Service: Neurosurgery;  Laterality: N/A;    TONSILLECTOMY         Review of patient's allergies indicates:   Allergen Reactions    Hydroxyzine     Tizanidine        No current facility-administered medications on file prior to encounter.     Current Outpatient Medications on File Prior to Encounter   Medication Sig    acetaminophen (TYLENOL) 325 MG tablet Take 2 tablets (650 mg total) by mouth every 6 (six) hours as needed (HEADACHE, TEMPERATURE - FEVER > 100.4). (Patient not taking: Reported on 2021)    albuterol-ipratropium (DUO-NEB) 2.5 mg-0.5 mg/3 mL nebulizer solution Take 3 mLs by nebulization every 6 (six) hours as needed for Wheezing or Shortness of Breath. Rescue    aspirin (ECOTRIN) 81 MG EC tablet Take 81 mg by mouth once daily.    donepeziL (ARICEPT) 10 MG tablet Take 1 tablet (10 mg total) by mouth every evening.    doxycycline (VIBRA-TABS) 100 MG tablet SMARTSI  Tablet(s) By Mouth    DULoxetine (CYMBALTA) 60 MG capsule Take 1 capsule (60 mg total) by mouth once daily.    ferrous sulfate 324 mg (65 mg iron) TbEC Take 1 tablet (324 mg total) by mouth every evening.    furosemide (LASIX) 40 MG tablet Take 1 tablet (40 mg total) by mouth once daily.    gabapentin (NEURONTIN) 300 MG capsule Take 1 capsule (300 mg total) by mouth 2 (two) times daily.    HYDROcodone-acetaminophen (NORCO) 5-325 mg per tablet Take 1 tablet by mouth every 8 (eight) hours as needed for Pain.    hydrocortisone 1 % lotion 1 mL DAILY (route: topical)    isosorbide mononitrate (IMDUR) 60 MG 24 hr tablet Take 1 tablet (60 mg total) by mouth once daily.    memantine (NAMENDA) 10 MG Tab Take 1 tablet (10 mg total) by mouth 2 (two) times daily.    ondansetron (ZOFRAN-ODT) 4 MG TbDL Take 1 tablet (4 mg total) by mouth every 8 (eight) hours as needed. (Patient not taking: Reported on 1/19/2021)    pantoprazole (PROTONIX) 40 MG tablet Take 1 tablet (40 mg total) by mouth before breakfast.    pravastatin (PRAVACHOL) 40 MG tablet Take 1 tablet (40 mg total) by mouth every evening.    primidone (MYSOLINE) 50 MG Tab Take 50 mg by mouth 2 (two) times daily.    senna-docusate 8.6-50 mg (PERICOLACE) 8.6-50 mg per tablet Take 2 tablets by mouth nightly as needed for Constipation.    [DISCONTINUED] aluminum & magnesium hydroxide-simethicone (MYLANTA MAX STRENGTH) 400-400-40 mg/5 mL suspension Take 30 mLs by mouth every 6 (six) hours as needed for Indigestion. (Patient not taking: Reported on 1/19/2021)    [DISCONTINUED] clopidogreL (PLAVIX) 75 mg tablet Take 1 tablet (75 mg total) by mouth once daily.    [DISCONTINUED] miconazole NITRATE 2 % (MICOTIN) 2 % top powder Apply topically 2 (two) times daily. for 15 days     Family History       Problem Relation (Age of Onset)    Heart disease Father    Heart failure Mother    Hypertension Father    Multiple sclerosis Sister    Stroke Mother          Tobacco Use    Smoking  status: Former Smoker     Types: Cigarettes     Quit date: 2000     Years since quittin.4    Smokeless tobacco: Never Used   Substance and Sexual Activity    Alcohol use: Not Currently    Drug use: Never    Sexual activity: Not Currently     Review of Systems   Unable to perform ROS: Dementia   Objective:     Vital Signs (Most Recent):  Temp: 98.8 °F (37.1 °C) (22)  Pulse: 66 (22)  Resp: (!) 22 (22)  BP: (!) 143/62 (22)  SpO2: 100 % (22)   Vital Signs (24h Range):  Temp:  [98.4 °F (36.9 °C)-98.9 °F (37.2 °C)] 98.8 °F (37.1 °C)  Pulse:  [61-80] 66  Resp:  [11-24] 22  SpO2:  [91 %-100 %] 100 %  BP: (106-166)/(46-72) 143/62     Weight: (!) 158.1 kg (348 lb 8 oz)  Body mass index is 51.46 kg/m².    Physical Exam  Vitals and nursing note reviewed.   Constitutional:       General: She is not in acute distress.     Appearance: She is obese. She is ill-appearing.   HENT:      Head: Normocephalic and atraumatic.      Nose: Nose normal. No congestion or rhinorrhea.      Mouth/Throat:      Mouth: Mucous membranes are moist.      Pharynx: No oropharyngeal exudate.   Eyes:      General: No scleral icterus.  Neck:      Vascular: No carotid bruit.   Cardiovascular:      Rate and Rhythm: Normal rate and regular rhythm.      Heart sounds: Normal heart sounds. No murmur heard.    No friction rub. No gallop.   Pulmonary:      Effort: No respiratory distress.      Breath sounds: Normal breath sounds. No stridor. No wheezing, rhonchi or rales.   Chest:      Chest wall: No tenderness.   Abdominal:      General: Bowel sounds are normal. There is no distension.      Palpations: Abdomen is soft. There is no mass.      Tenderness: There is no abdominal tenderness. There is no right CVA tenderness, left CVA tenderness, guarding or rebound.      Hernia: No hernia is present.   Musculoskeletal:         General: No swelling, tenderness or deformity.      Cervical back: Neck supple.  No rigidity.   Lymphadenopathy:      Cervical: No cervical adenopathy.   Skin:     Coloration: Skin is not jaundiced.      Findings: No rash.   Neurological:      Sensory: No sensory deficit.      Motor: Weakness present.      Coordination: Coordination normal.   Psychiatric:      Comments: Dementia             Significant Labs: All pertinent labs within the past 24 hours have been reviewed.    Significant Imaging: I have reviewed all pertinent imaging results/findings within the past 24 hours.

## 2022-05-22 NOTE — PROGRESS NOTES
"[facility] Medicine  Progress Note    Patient Name: Martina Betancourt  MRN: [id number]  Patient Class: IP- Inpatient   Admission Date: 5/20/2022  Length of Stay: 2 days  Attending Physician: Joe Fuller III, MD  Primary Care Provider: Joe Fuller III, MD        Subjective:     Principal Problem:Acute on chronic respiratory failure with hypoxia and hypercapnia        HPI:  ED HPI:  Martina Betancourt is a 71 y.o. female with PMHX of dementia, NSTEMI, pneumonia, morbid obesity, debility, UTI who presents to the emergency department C/O chills.     Patient brought by EMS because home health nurse states that patient has been shaking and not eating.  They were concerned about a UTI as this is the symptoms she has had before.     Patient is poor historian.  She reports right-sided "sciatica" pain        IM HPI:  Patient is well known to me and has a history of obesity hypoventilation syndrome with chronic hypercapnic respiratory failure.  I spoken to the daughter and the patient has had a recent admission to our acute care facility for hypercapnia and she was placed on a noninvasive ventilator at home and has been wearing q.h.s..  She now returns back to our facility for a similar event with pCO2 in the 80s.  The daughter thinks that during the day she is under ventilating and may need the machine continuously.  We discussed quality of life having to maintain on BiPAP continuously and I have discussed hospice care and end of life care with the daughter.      Overview/Hospital Course:  5/22/2022 FM:  Patient is awake and alert today.  Patient's daughter is at bedside.  The patient and her daughter had a long conversation and they feel that having to wear a noninvasive ventilator continuously is no quality of life and therefore they are requesting hospice care at home.  They understand that this means not returning back to the hospital for respiratory issues.  The patient has had a long douglas over the " last few years with medical illnesses and a decline.  I have also discussed tracheostomy and they are not interested.  Will refer to hospice for chronic respiratory failure.      Past Medical History:   Diagnosis Date    Alzheimer disease     Alzheimer disease     Bronchitis due to Staphylococcus aureus     CAD (coronary artery disease)     Coronary artery disease     Dementia     Discitis     Epidural abscess     HTN (hypertension)     Hydronephrosis     Hyperlipemia     Hyperlipidemia     Hypertension     Mixed hyperlipidemia     MRSA bacteremia     Myopathy     Myopathy     Non-ST elevation (NSTEMI) myocardial infarction     NSTEMI (non-ST elevation myocardial infarction)     Obesity     Obesity, unspecified     Old myocardial infarction     Paraplegia     Pneumonia     Pneumonia     Sleep apnea     Sleep apnea, unspecified        Past Surgical History:   Procedure Laterality Date    APPENDECTOMY      BLADDER SUSPENSION      CHOLECYSTECTOMY      CHOLECYSTECTOMY      CORONARY STENT PLACEMENT      HYSTERECTOMY      SURGICAL REMOVAL OF VERTEBRAL BODY OF THORACIC SPINE N/A 12/08/2020    Procedure: T9-T10 corpectomy, posterior instrumented fusion T7-T12.;  Surgeon: Everardo Gongora MD;  Location: [facility] OR [address] [place] [address];  Service: Neurosurgery;  Laterality: N/A;    TONSILLECTOMY         Review of patient's allergies indicates:   Allergen Reactions    Hydroxyzine     Tizanidine        No current facility-administered medications on file prior to encounter.     Current Outpatient Medications on File Prior to Encounter   Medication Sig    acetaminophen (TYLENOL) 325 MG tablet Take 2 tablets (650 mg total) by mouth every 6 (six) hours as needed (HEADACHE, TEMPERATURE - FEVER > 100.4). (Patient not taking: Reported on 1/19/2021)    albuterol-ipratropium (DUO-NEB) 2.5 mg-0.5 mg/3 mL nebulizer solution Take 3 mLs by nebulization every 6 (six) hours as needed for Wheezing or Shortness of Breath.  Rescue    aspirin (ECOTRIN) 81 MG EC tablet Take 81 mg by mouth once daily.    donepeziL (ARICEPT) 10 MG tablet Take 1 tablet (10 mg total) by mouth every evening.    doxycycline (VIBRA-TABS) 100 MG tablet SMARTSI Tablet(s) By Mouth    DULoxetine (CYMBALTA) 60 MG capsule Take 1 capsule (60 mg total) by mouth once daily.    ferrous sulfate 324 mg (65 mg iron) TbEC Take 1 tablet (324 mg total) by mouth every evening.    furosemide (LASIX) 40 MG tablet Take 1 tablet (40 mg total) by mouth once daily.    gabapentin (NEURONTIN) 300 MG capsule Take 1 capsule (300 mg total) by mouth 2 (two) times daily.    HYDROcodone-acetaminophen (NORCO) 5-325 mg per tablet Take 1 tablet by mouth every 8 (eight) hours as needed for Pain.    hydrocortisone 1 % lotion 1 mL DAILY (route: topical)    isosorbide mononitrate (IMDUR) 60 MG 24 hr tablet Take 1 tablet (60 mg total) by mouth once daily.    memantine (NAMENDA) 10 MG Tab Take 1 tablet (10 mg total) by mouth 2 (two) times daily.    ondansetron (ZOFRAN-ODT) 4 MG TbDL Take 1 tablet (4 mg total) by mouth every 8 (eight) hours as needed. (Patient not taking: Reported on 2021)    pantoprazole (PROTONIX) 40 MG tablet Take 1 tablet (40 mg total) by mouth before breakfast.    pravastatin (PRAVACHOL) 40 MG tablet Take 1 tablet (40 mg total) by mouth every evening.    primidone (MYSOLINE) 50 MG Tab Take 50 mg by mouth 2 (two) times daily.    senna-docusate 8.6-50 mg (PERICOLACE) 8.6-50 mg per tablet Take 2 tablets by mouth nightly as needed for Constipation.    [DISCONTINUED] aluminum & magnesium hydroxide-simethicone (MYLANTA MAX STRENGTH) 400-400-40 mg/5 mL suspension Take 30 mLs by mouth every 6 (six) hours as needed for Indigestion. (Patient not taking: Reported on 2021)    [DISCONTINUED] clopidogreL (PLAVIX) 75 mg tablet Take 1 tablet (75 mg total) by mouth once daily.    [DISCONTINUED] miconazole NITRATE 2 % (MICOTIN) 2 % top powder Apply topically 2 (two)  times daily. for 15 days     Family History       Problem Relation (Age of Onset)    Heart disease Father    Heart failure Mother    Hypertension Father    Multiple sclerosis Sister    Stroke Mother          Tobacco Use    Smoking status: Former Smoker     Types: Cigarettes     Quit date: 2000     Years since quittin.4    Smokeless tobacco: Never Used   Substance and Sexual Activity    Alcohol use: Not Currently    Drug use: Never    Sexual activity: Not Currently     Review of Systems   Unable to perform ROS: Dementia   Objective:     Vital Signs (Most Recent):  Temp: 98.8 °F (37.1 °C) (22)  Pulse: 66 (22)  Resp: (!) 22 (22)  BP: (!) 143/62 (22)  SpO2: 100 % (22)   Vital Signs (24h Range):  Temp:  [98.4 °F (36.9 °C)-98.9 °F (37.2 °C)] 98.8 °F (37.1 °C)  Pulse:  [61-80] 66  Resp:  [11-24] 22  SpO2:  [91 %-100 %] 100 %  BP: (106-166)/(46-72) 143/62     Weight: (!) 158.1 kg (348 lb 8 oz)  Body mass index is 51.46 kg/m².    Physical Exam  Vitals and nursing note reviewed.   Constitutional:       General: She is not in acute distress.     Appearance: She is obese. She is ill-appearing.   HENT:      Head: Normocephalic and atraumatic.      Nose: Nose normal. No congestion or rhinorrhea.      Mouth/Throat:      Mouth: Mucous membranes are moist.      Pharynx: No oropharyngeal exudate.   Eyes:      General: No scleral icterus.  Neck:      Vascular: No carotid bruit.   Cardiovascular:      Rate and Rhythm: Normal rate and regular rhythm.      Heart sounds: Normal heart sounds. No murmur heard.    No friction rub. No gallop.   Pulmonary:      Effort: No respiratory distress.      Breath sounds: Normal breath sounds. No stridor. No wheezing, rhonchi or rales.   Chest:      Chest wall: No tenderness.   Abdominal:      General: Bowel sounds are normal. There is no distension.      Palpations: Abdomen is soft. There is no mass.      Tenderness: There is no  abdominal tenderness. There is no right CVA tenderness, left CVA tenderness, guarding or rebound.      Hernia: No hernia is present.   Musculoskeletal:         General: No swelling, tenderness or deformity.      Cervical back: Neck supple. No rigidity.   Lymphadenopathy:      Cervical: No cervical adenopathy.   Skin:     Coloration: Skin is not jaundiced.      Findings: No rash.   Neurological:      Sensory: No sensory deficit.      Motor: Weakness present.      Coordination: Coordination normal.   Psychiatric:      Comments: Dementia             Significant Labs: All pertinent labs within the past 24 hours have been reviewed.    Significant Imaging: I have reviewed all pertinent imaging results/findings within the past 24 hours.      Assessment/Plan:      * Acute on chronic respiratory failure with hypoxia and hypercapnia  Continuous noninvasive ventilation versus hospice care.  Daughter will meet with patient today.  Patient has underlying dementia.  Family is electing hospice care will consult today and once home is prepared can be DCd.    AMS (altered mental status)        Bedbound        Severe obesity (BMI >= 40)  Body mass index is 51.46 kg/m². Morbid obesity complicates all aspects of disease management from diagnostic modalities to treatment. Weight loss encouraged and health benefits explained to patient.         Dementia without behavioral disturbance          VTE Risk Mitigation (From admission, onward)         Ordered     IP VTE HIGH RISK PATIENT  Once         05/20/22 2322     Place sequential compression device  Until discontinued         05/20/22 2322                Discharge Planning   BRIT:      Code Status: DNR   Is the patient medically ready for discharge?:     Reason for patient still in hospital (select all that apply): Patient trending condition                     Joe Fuller III, MD  Department of Hospital Medicine   [facility]

## 2022-05-23 VITALS
SYSTOLIC BLOOD PRESSURE: 123 MMHG | WEIGHT: 293 LBS | HEIGHT: 69 IN | RESPIRATION RATE: 20 BRPM | TEMPERATURE: 99 F | OXYGEN SATURATION: 99 % | HEART RATE: 63 BPM | BODY MASS INDEX: 43.4 KG/M2 | DIASTOLIC BLOOD PRESSURE: 60 MMHG

## 2022-05-23 LAB
ALBUMIN SERPL BCP-MCNC: 2.5 G/DL (ref 3.5–5.2)
ALP SERPL-CCNC: 89 U/L (ref 55–135)
ALT SERPL W/O P-5'-P-CCNC: 10 U/L (ref 10–44)
ANION GAP SERPL CALC-SCNC: 0 MMOL/L (ref 8–16)
AST SERPL-CCNC: 10 U/L (ref 10–40)
BASOPHILS # BLD AUTO: 0.02 K/UL (ref 0–0.2)
BASOPHILS NFR BLD: 0.3 % (ref 0–1.9)
BILIRUB SERPL-MCNC: 0.4 MG/DL (ref 0.1–1)
BUN SERPL-MCNC: 16 MG/DL (ref 8–23)
CALCIUM SERPL-MCNC: 9 MG/DL (ref 8.7–10.5)
CHLORIDE SERPL-SCNC: 99 MMOL/L (ref 95–110)
CO2 SERPL-SCNC: 42 MMOL/L (ref 23–29)
CREAT SERPL-MCNC: 0.7 MG/DL (ref 0.5–1.4)
DIFFERENTIAL METHOD: ABNORMAL
EOSINOPHIL # BLD AUTO: 0.1 K/UL (ref 0–0.5)
EOSINOPHIL NFR BLD: 1.8 % (ref 0–8)
ERYTHROCYTE [DISTWIDTH] IN BLOOD BY AUTOMATED COUNT: 14.4 % (ref 11.5–14.5)
EST. GFR  (AFRICAN AMERICAN): >60 ML/MIN/1.73 M^2
EST. GFR  (NON AFRICAN AMERICAN): >60 ML/MIN/1.73 M^2
GLUCOSE SERPL-MCNC: 124 MG/DL (ref 70–110)
HCT VFR BLD AUTO: 39.5 % (ref 37–48.5)
HGB BLD-MCNC: 11.4 G/DL (ref 12–16)
IMM GRANULOCYTES # BLD AUTO: 0.02 K/UL (ref 0–0.04)
IMM GRANULOCYTES NFR BLD AUTO: 0.3 % (ref 0–0.5)
LYMPHOCYTES # BLD AUTO: 1.6 K/UL (ref 1–4.8)
LYMPHOCYTES NFR BLD: 21.9 % (ref 18–48)
MCH RBC QN AUTO: 28 PG (ref 27–31)
MCHC RBC AUTO-ENTMCNC: 28.9 G/DL (ref 32–36)
MCV RBC AUTO: 97 FL (ref 82–98)
MONOCYTES # BLD AUTO: 0.9 K/UL (ref 0.3–1)
MONOCYTES NFR BLD: 12.5 % (ref 4–15)
NEUTROPHILS # BLD AUTO: 4.6 K/UL (ref 1.8–7.7)
NEUTROPHILS NFR BLD: 63.2 % (ref 38–73)
NRBC BLD-RTO: 0 /100 WBC
PLATELET # BLD AUTO: 166 K/UL (ref 150–450)
PMV BLD AUTO: 9.9 FL (ref 9.2–12.9)
POTASSIUM SERPL-SCNC: 3.7 MMOL/L (ref 3.5–5.1)
PROT SERPL-MCNC: 5.9 G/DL (ref 6–8.4)
RBC # BLD AUTO: 4.07 M/UL (ref 4–5.4)
SODIUM SERPL-SCNC: 141 MMOL/L (ref 136–145)
WBC # BLD AUTO: 7.29 K/UL (ref 3.9–12.7)

## 2022-05-23 PROCEDURE — 99900035 HC TECH TIME PER 15 MIN (STAT)

## 2022-05-23 PROCEDURE — 36415 COLL VENOUS BLD VENIPUNCTURE: CPT | Performed by: INTERNAL MEDICINE

## 2022-05-23 PROCEDURE — 94761 N-INVAS EAR/PLS OXIMETRY MLT: CPT

## 2022-05-23 PROCEDURE — 99900031 HC PATIENT EDUCATION (STAT)

## 2022-05-23 PROCEDURE — 85025 COMPLETE CBC W/AUTO DIFF WBC: CPT | Performed by: INTERNAL MEDICINE

## 2022-05-23 PROCEDURE — 27000221 HC OXYGEN, UP TO 24 HOURS

## 2022-05-23 PROCEDURE — 25000003 PHARM REV CODE 250: Performed by: INTERNAL MEDICINE

## 2022-05-23 PROCEDURE — 80053 COMPREHEN METABOLIC PANEL: CPT | Performed by: INTERNAL MEDICINE

## 2022-05-23 PROCEDURE — 94640 AIRWAY INHALATION TREATMENT: CPT

## 2022-05-23 PROCEDURE — 25000242 PHARM REV CODE 250 ALT 637 W/ HCPCS: Performed by: EMERGENCY MEDICINE

## 2022-05-23 RX ORDER — MICONAZOLE NITRATE 2 %
POWDER (GRAM) TOPICAL 2 TIMES DAILY
Qty: 85 G | Refills: 0 | Status: SHIPPED | OUTPATIENT
Start: 2022-05-23 | End: 2022-06-07

## 2022-05-23 RX ADMIN — PRIMIDONE 50 MG: 50 TABLET ORAL at 08:05

## 2022-05-23 RX ADMIN — MEMANTINE 10 MG: 10 TABLET ORAL at 08:05

## 2022-05-23 RX ADMIN — FERROUS SULFATE TAB 325 MG (65 MG ELEMENTAL FE) 1 EACH: 325 (65 FE) TAB at 08:05

## 2022-05-23 RX ADMIN — GABAPENTIN 300 MG: 300 CAPSULE ORAL at 12:05

## 2022-05-23 RX ADMIN — GABAPENTIN 300 MG: 300 CAPSULE ORAL at 08:05

## 2022-05-23 RX ADMIN — IPRATROPIUM BROMIDE AND ALBUTEROL SULFATE 3 ML: 2.5; .5 SOLUTION RESPIRATORY (INHALATION) at 01:05

## 2022-05-23 RX ADMIN — HYDROCODONE BITARTRATE AND ACETAMINOPHEN 1 TABLET: 5; 325 TABLET ORAL at 04:05

## 2022-05-23 RX ADMIN — IPRATROPIUM BROMIDE AND ALBUTEROL SULFATE 3 ML: 2.5; .5 SOLUTION RESPIRATORY (INHALATION) at 07:05

## 2022-05-23 RX ADMIN — DULOXETINE HYDROCHLORIDE 60 MG: 60 CAPSULE, DELAYED RELEASE ORAL at 08:05

## 2022-05-23 RX ADMIN — PANTOPRAZOLE SODIUM 40 MG: 40 TABLET, DELAYED RELEASE ORAL at 05:05

## 2022-05-23 RX ADMIN — ASPIRIN 81 MG: 81 TABLET, COATED ORAL at 08:05

## 2022-05-23 RX ADMIN — ISOSORBIDE MONONITRATE 60 MG: 30 TABLET, EXTENDED RELEASE ORAL at 08:05

## 2022-05-23 NOTE — PLAN OF CARE
Plan of care reviewed with the patient. Patient with complaints of pain on shift, moderately relieved by PRN oral pain medication.

## 2022-05-23 NOTE — PLAN OF CARE
[facility]  Discharge Final Note    Primary Care Provider: Joe Fuller III, MD    Expected Discharge Date: 5/23/2022    Final Discharge Note (most recent)     Final Note - 05/23/22 1611        Final Note    Assessment Type Final Discharge Note        Post-Acute Status    Post-Acute Authorization Hospice     Hospice Status Set-up Complete/Auth obtained     Discharge Delays Ambulance Transport/Facility Transport                 Important Message from Medicare  Important Message from Medicare regarding Discharge Appeal Rights: Given to patient/caregiver, Explained to patient/caregiver, Signed/date by patient/caregiver (Signed by daughter at the bedside.)     Date IMM was signed: 05/22/22  Time IMM was signed: 1042    Contact Info     Joe Fuller III, MD   Specialty: Internal Medicine   Relationship: PCP - General    [address] [place] [address]   Phone: [phone]       Next Steps: Schedule an appointment as soon as possible for a visit    Instructions: Primary care follow up    Hospice        Next Steps: Follow up in 1 day(s)      Final note. The patient will be discharging home with hospice care. At this time, she does not require any assistance from case management.

## 2022-05-23 NOTE — CARE UPDATE
05/22/22 2148   Patient Assessment/Suction   Level of Consciousness (AVPU) alert   Respiratory Effort Unlabored   Expansion/Accessory Muscles/Retractions no use of accessory muscles   All Lung Fields Breath Sounds diminished   Rhythm/Pattern, Respiratory unlabored   PRE-TX-O2   O2 Device (Oxygen Therapy) nasal cannula   $ Is the patient on Low Flow Oxygen? Yes   Flow (L/min) 4   SpO2 (!) 36 %   Pulse Oximetry Type Intermittent   $ Pulse Oximetry - Multiple Charge Pulse Oximetry - Multiple   Pulse 80   Resp 20   Patient wore bipap x 4min before wanting it off. Placed back on nc 4lpm with no disresss noted

## 2022-05-23 NOTE — CARE UPDATE
05/22/22 1940   Patient Assessment/Suction   Level of Consciousness (AVPU) alert   Respiratory Effort Unlabored   Expansion/Accessory Muscles/Retractions no use of accessory muscles   All Lung Fields Breath Sounds diminished   Rhythm/Pattern, Respiratory unlabored   Cough Frequency no cough   PRE-TX-O2   O2 Device (Oxygen Therapy) BiPAP   $ Is the patient on Low Flow Oxygen? Yes   Oxygen Concentration (%) 40   SpO2 97 %   Pulse Oximetry Type Intermittent   $ Pulse Oximetry - Multiple Charge Pulse Oximetry - Multiple   Pulse 78   Resp 20   Aerosol Therapy   $ Aerosol Therapy Charges Aerosol Treatment   Daily Review of Necessity (SVN) completed   Respiratory Treatment Status (SVN) given   Treatment Route (SVN) mask   Patient Position (SVN) HOB elevated   Post Treatment Assessment (SVN) breath sounds unchanged   Signs of Intolerance (SVN) none   Breath Sounds Post-Respiratory Treatment   Throughout All Fields Post-Treatment All Fields   Throughout All Fields Post-Treatment diminished   Post-treatment Heart Rate (beats/min) 80   Post-treatment Resp Rate (breaths/min) 20   Skin Integrity   $ Wound Care Tech Time 15 min   Area Observed Cheek;Bridge of nose;Chin;Forehead   Skin Appearance without discoloration   Barrier used? Liquid Skin Barrier   Equipment Change   $ RT Equipment medium full face mask   Ready to Wean/Extubation Screen   FIO2<=50 (chart decimal) 0.4   Preset CPAP/BiPAP Settings   Mode Of Delivery BiPAP S/T   $ CPAP/BiPAP Daily Charge BiPAP/CPAP Daily   $ Initial CPAP/BiPAP Setup? No   $ Is patient using? Yes   Size of Mask Medium/Large   Sized Appropriately? Yes   Equipment Type Trilogy    Airway Device Type medium full face mask   Humidifier not applicable   Ipap 16   EPAP (cm H2O) 16   Pressure Support (cm H2O) 0   ITime (sec) 1.3   Rise Time (sec) 2   Patient CPAP/BiPAP Settings   RR Total (Breaths/Min) 20   Tidal Volume (mL) 567   VE Minute Ventilation (L/min) 29.9 L/min   Total Leak (L/Min) 8    Patient Trigger - ST Mode Only (%) 100   CPAP/BiPAP Backup Settings   Backup Rate 8 breaths per minute (bpm)   Respiratory Evaluation   $ Care Plan Tech Time 30 min   Pt placed on bipap for hs

## 2022-05-23 NOTE — DISCHARGE SUMMARY
"[facility] Medicine  Discharge Summary      Patient Name: Martina Betancourt  MRN: [id number]  Patient Class: IP- Inpatient  Admission Date: 5/20/2022  Hospital Length of Stay: 3 days  Discharge Date and Time:  05/23/2022 8:40 AM  Attending Physician: Joe Fuller III, MD   Discharging Provider: Joe Fuller III, MD  Primary Care Provider: Joe Fuller III, MD      HPI:   ED HPI:  Martina Betancourt is a 71 y.o. female with PMHX of dementia, NSTEMI, pneumonia, morbid obesity, debility, UTI who presents to the emergency department C/O chills.     Patient brought by EMS because home health nurse states that patient has been shaking and not eating.  They were concerned about a UTI as this is the symptoms she has had before.     Patient is poor historian.  She reports right-sided "sciatica" pain        IM HPI:  Patient is well known to me and has a history of obesity hypoventilation syndrome with chronic hypercapnic respiratory failure.  I spoken to the daughter and the patient has had a recent admission to our acute care facility for hypercapnia and she was placed on a noninvasive ventilator at home and has been wearing q.h.s..  She now returns back to our facility for a similar event with pCO2 in the 80s.  The daughter thinks that during the day she is under ventilating and may need the machine continuously.  We discussed quality of life having to maintain on BiPAP continuously and I have discussed hospice care and end of life care with the daughter.      * No surgery found *      Hospital Course:   5/22/2022 FM:  Patient is awake and alert today.  Patient's daughter is at bedside.  The patient and her daughter had a long conversation and they feel that having to wear a noninvasive ventilator continuously is no quality of life and therefore they are requesting hospice care at home.  They understand that this means not returning back to the hospital for respiratory issues.  The patient has had a long " douglas over the last few years with medical illnesses and a decline.  I have also discussed tracheostomy and they are not interested.  Will refer to hospice for chronic respiratory failure.    Discharge Note:  Patient and her daughter have agreed to hospice care.  Hospice is evaluating and will discharge home today and they can prepare the house and all necessary supplies and equipment.       Goals of Care Treatment Preferences:  Code Status: DNR       LaPOST: Yes           Consults:   Consults (From admission, onward)        Status Ordering Provider     Inpatient consult to Social Work/Case Management  Once        Provider:  (Not yet assigned)    ERIC Hernandez III          * Acute on chronic respiratory failure with hypoxia and hypercapnia  Continuous noninvasive ventilation versus hospice care.  Daughter will meet with patient today.  Patient has underlying dementia.  Family is electing hospice care will consult today and once home is prepared can be DCd.    AMS (altered mental status)        Bedbound        Severe obesity (BMI >= 40)  Body mass index is 51.46 kg/m². Morbid obesity complicates all aspects of disease management from diagnostic modalities to treatment. Weight loss encouraged and health benefits explained to patient.         Dementia without behavioral disturbance          Final Active Diagnoses:    Diagnosis Date Noted POA    PRINCIPAL PROBLEM:  Acute on chronic respiratory failure with hypoxia and hypercapnia [J96.21, J96.22] 04/07/2022 Yes    AMS (altered mental status) [R41.82] 04/07/2022 Yes    Bedbound [Z74.01] 04/07/2022 Not Applicable    Severe obesity (BMI >= 40) [E66.01] 08/10/2020 Yes    Dementia without behavioral disturbance [F03.90] 07/26/2020 Yes      Problems Resolved During this Admission:       Discharged Condition: poor    Disposition: Hospice/Medical Facility    Follow Up:   Follow-up Information     Eric Fuller III, MD. Schedule an appointment as soon as  possible for a visit .    Specialty: Internal Medicine  Why: Primary care follow up  Contact information:  [address] [facility] [address] [phone]             Hospice Follow up in 1 day(s).                     Patient Instructions:      Diet Cardiac     Activity as tolerated       Significant Diagnostic Studies: Noted.    Pending Diagnostic Studies:     None         Medications:  Reconciled Home Medications:      Medication List      CONTINUE taking these medications    acetaminophen 325 MG tablet  Commonly known as: TYLENOL  Take 2 tablets (650 mg total) by mouth every 6 (six) hours as needed (HEADACHE, TEMPERATURE - FEVER > 100.4).     albuterol-ipratropium 2.5 mg-0.5 mg/3 mL nebulizer solution  Commonly known as: DUO-NEB  Take 3 mLs by nebulization every 6 (six) hours as needed for Wheezing or Shortness of Breath. Rescue     aspirin 81 MG EC tablet  Commonly known as: ECOTRIN  Take 81 mg by mouth once daily.     donepeziL 10 MG tablet  Commonly known as: ARICEPT  Take 1 tablet (10 mg total) by mouth every evening.     DULoxetine 60 MG capsule  Commonly known as: CYMBALTA  Take 1 capsule (60 mg total) by mouth once daily.     ferrous sulfate 324 mg (65 mg iron) Tbec  Take 1 tablet (324 mg total) by mouth every evening.     furosemide 40 MG tablet  Commonly known as: LASIX  Take 1 tablet (40 mg total) by mouth once daily.     gabapentin 300 MG capsule  Commonly known as: NEURONTIN  Take 1 capsule (300 mg total) by mouth 2 (two) times daily.     HYDROcodone-acetaminophen 5-325 mg per tablet  Commonly known as: NORCO  Take 1 tablet by mouth every 8 (eight) hours as needed for Pain.     hydrocortisone 1 % lotion  1 mL DAILY (route: topical)     isosorbide mononitrate 60 MG 24 hr tablet  Commonly known as: IMDUR  Take 1 tablet (60 mg total) by mouth once daily.     memantine 10 MG Tab  Commonly known as: NAMENDA  Take 1 tablet (10 mg total) by mouth 2 (two) times daily.     miconazole NITRATE 2 % 2 % top  powder  Commonly known as: MICOTIN  Apply topically 2 (two) times daily. for 15 days     ondansetron 4 MG Tbdl  Commonly known as: ZOFRAN-ODT  Take 1 tablet (4 mg total) by mouth every 8 (eight) hours as needed.     pantoprazole 40 MG tablet  Commonly known as: PROTONIX  Take 1 tablet (40 mg total) by mouth before breakfast.     pravastatin 40 MG tablet  Commonly known as: PRAVACHOL  Take 1 tablet (40 mg total) by mouth every evening.     primidone 50 MG Tab  Commonly known as: MYSOLINE  Take 50 mg by mouth 2 (two) times daily.     senna-docusate 8.6-50 mg 8.6-50 mg per tablet  Commonly known as: PERICOLACE  Take 2 tablets by mouth nightly as needed for Constipation.        STOP taking these medications    aluminum & magnesium hydroxide-simethicone 400-400-40 mg/5 mL suspension  Commonly known as: MYLANTA MAX STRENGTH     clopidogreL 75 mg tablet  Commonly known as: PLAVIX     doxycycline 100 MG tablet  Commonly known as: VIBRA-TABS            Indwelling Lines/Drains at time of discharge:   Lines/Drains/Airways     None                 Time spent on the discharge of patient: 45 minutes         Joe Fuller III, MD  Department of Hospital Medicine  [facility]

## 2022-05-23 NOTE — PLAN OF CARE
05/23/22 0812   Post-Acute Status   Post-Acute Authorization Hospice   Hospice Status Referrals Sent   Discharge Delays None known at this time   Discharge Plan   Discharge Plan A Home with family;Hospice/home   Discharge Plan B Home with family;Hospice/home     The patient and her daughter agreed to [facility]. New referral was sent via [facility]. SW will continue to monitor.     Addendum: 0830-Referral was received per Raquel with admissions.     1100- Pending the delivery of the patient's bed. Will continue to monitor.      1600-Bed was delivered.

## 2022-05-24 NOTE — NURSING
Patient discharged home on hospice. Left by [facility]. Daughter notified and also [facility] patient on the way home at this time.

## 2022-09-24 NOTE — PLAN OF CARE
[facility]  Initial Discharge Assessment       Primary Care Provider: Joe Fuller III, MD    Admission Diagnosis: Urinary tract infection without hematuria, site unspecified [N39.0]  Acute on chronic respiratory failure with hypoxia and hypercapnia [J96.21, J96.22]    Admission Date: 4/6/2022  Expected Discharge Date:     Discharge Barriers Identified: None    Payor: MEDICARE / Plan: MEDICARE PART A & B / Product Type: Government /     Extended Emergency Contact Information  Primary Emergency Contact: [place], Sharon  Mobile Phone: [phone]  Relation: Daughter  Secondary Emergency Contact: Christelle Yoder  Mobile Phone: [phone]  Relation: Grandchild    Discharge Plan A: Home with family, Home Health  Discharge Plan B: Home with family, Home Health      [place] [address] [address] [facility] [address] [facility] [address]  Phone: [phone] Fax: [phone]      Initial Assessment (most recent)     Adult Discharge Assessment - 04/07/22 1322        Discharge Assessment    Assessment Type Discharge Planning Assessment     Confirmed/corrected address, phone number and insurance Yes     Confirmed Demographics Correct on Facesheet     Source of Information family     When was your last doctors appointment? --   The daughter stated that her last appointment was in [facility].    Reason For Admission Sepsis     Lives With grandchild(jamaica);child(j[place]ica), adult;other (see comments)   great grandchildren    Do you expect to return to your current living situation? Yes     Do you have help at home or someone to help you manage your care at home? Yes     Who are your caregiver(s) and their phone number(s)? Sharon (daughter) [phone]  and Christelle (grandchild)     Current cognitive status: Unable to Assess     Walking or Climbing Stairs Difficulty ambulation difficulty, requires equipment;ambulation difficulty, dependent;transferring difficulty, requires equipment;ambulation  Mother and patient notified.   difficulty, assistance 1 person     Mobility Management wheelchair and lift     Dressing/Bathing Difficulty bathing difficulty, dependent;bathing difficulty, assistance 1 person;dressing difficulty, assistance 1 person     Dressing/Bathing Management bed baths     Home Accessibility wheelchair accessible;other (see comments)   The patient stated that their is a ramp attached to the home.    Home Layout Able to live on 1st floor     Equipment Currently Used at Home other (see comments);wheelchair;oxygen   lift    Patient currently being followed by outpatient case management? No     Do you currently have service(s) that help you manage your care at home? No     Do you take prescription medications? Yes     Do you have prescription coverage? Yes     Do you have any problems affording any of your prescribed medications? No     Is the patient taking medications as prescribed? yes     Who is going to help you get home at discharge? ambulance     How do you get to doctors appointments? other (see comments)   The patient's daughter stated that the ambulance transport the patient to her appointments.    Are you on dialysis? No     Do you take coumadin? No     Discharge Plan A Home with family;Home Health     Discharge Plan B Home with family;Home Health     DME Needed Upon Discharge  other (see comments)   TBD    Discharge Plan discussed with: Adult children     Discharge Barriers Identified None             Initial discharge assessment is completed. The assessment was done with the patient's daughter, Sharon. The patient lives with her family in a single story home. She requires total assistance with her ADLs. Sharon indicated that her son and daughter's fiance are with the patient during day. She is also able to check on the patient as well. I asked  Sharon about possible SNF placement, and she declined. The patient will be returning home with her family upon discharge. Sharon is open to home health care if it is  recommended.     Will continue to monitor.

## 2023-01-04 NOTE — HOSPITAL COURSE
patient was admitted  for acute on chronic hypoxic,hypercapnic respiratory failure,with history of YOVANI,non complaint with CPAP at home,was started in Bipap over night,repeat ABG show improvement,changed bipap to QHS,was stable on NC O 2 during day,,alert,had  elevated Trponin with no chest pain or EKG changed,cardiology was following,Echo show preserved EF,per cardiology small NSTEMI,she denies chest pain,troponin level are improved,was on ACS mediations,.  consulted wound care for below  breast wounds,started on miconazole,also foot wound care.   X ray right Ankle, show healing trimalleolar fracture,ortho. Was following,boot was placed,no plan for intervention.  Passed ST,was on diet.empiric IV Abx,  Her symptoms much improved,was alert and oriented,  PT,OT is recommending SNF,SW is consulted.  Patient was discharged to SNF with nightly bipap,oxygen during day,and follow up with PCP and orthopedic as out patient.   [FreeTextEntry1] : Aquilino Gant is a 51 year old male with a past medical history of testicular hypofunction and impotence. Patient was decreased to Testosterone cypionate injections 0.25 mL every 5 days from 0.5 mL every 5 days. Patient presents to office today to review lab work and assess symptoms. \par Patient reports he continues to do well. Good energy. Good libido. \par Denies urinary complaints this visit. \par ED managed on Sildenafil 100 mg. \par \par last visit his liver enzymes were a little high -- these were repeated a few months later and back down to normal\par \par Jan 2022 Blood work:\par Testosterone total- 392.0 ng/dL\par PSA- 0.45 ng/mL\par AST- 54\par ALT- 48\par Indirect Bilirubin >0.1\par Hemoglobin- 12.3 g/dL\par \par Previous Records:\par June 2021 blood work:\par Testosterone Total- 411 ng/dL\par SHBG- 24\par AST- 48\par ALT- 41\par Hemoglobin- 12.6 L\par \par April 2021\par Total T 896\par Liver enzymes -- ast 66, alt 54\par CBC -- 13.3\par \par 2/19/2020\par -PSA 0.6 ng/ml, 33 % free PSA\par -Total testosterone 762 (250-1100), Bioavailable 289.3 (110-575), testosterone free 140.7 ()\par \par 1/4/2019.- Trough\par -hemoglobin 13.4, platelets 207\par -Normal LFT\par -Total T 416 (250-1100), Bioavailable 152.9 (110-575), testosterone free 71.3 ()\par \par 1/5/2019.- Peak\par -PSA 0.6 ng/ml, 33 % Free PSA\par -Total T 599 (250-1100), Bioavailable 247.6 (110-575), testosterone free 117.9 () \par \par  Dec 20 2023\par Complete Blood Count - hgb 12.6\par Hepatic Function Panel;  AST - 51, ALT - 44\par PSA Profile - Total - 0.47\par Testosterone, Total - 330\par

## 2023-05-30 NOTE — ASSESSMENT & PLAN NOTE
Nutrition Problem:  (Moderate) Protein-Calorie Malnutrition  Malnutrition in the context of Chronic Illness/Injury    Related to (etiology):  Inadequate oral intake    Signs and Symptoms (as evidenced by):  Energy Intake: <75% of estimated energy requirement for >/= 1 month  Body Fat Depletion: mild depletion of orbitals   Muscle Mass Depletion: mild depletion of temples   Weight Loss: 12.5%% x 3 months    Interventions(treatment strategy):  Sodium modified diet  Collaboration with other providers  Commercial food- Boost Pudding    Nutrition Diagnosis Status:  New     On lopressor 50 bid for outpatient regiment  On admission hypertensive to 250s  Previous work up in 2015 negative for secondary cause, now with kidney transplant 2020  Started on nifedipine 5/29    Per primary  Pending US transplant doppler

## 2023-06-05 NOTE — PROGRESS NOTES
Pharmacokinetic Assessment Follow Up: IV Vancomycin    Vancomycin serum concentration assessment(s):    The trough level was drawn correctly and can be used to guide therapy at this time. The measurement is within the desired definitive target range of 15 to 20 mcg/mL.    Vancomycin Regimen Plan:    Continue regimen to Vancomycin 1000 mg IV every 24 hours with next serum trough concentration measured at 11/5/20 prior to 3 dose on 2330    Drug levels (last 3 results):  Recent Labs   Lab Result Units 10/31/20  2224 11/02/20  2324   Vancomycin-Trough ug/mL 15.8 16.6       Pharmacy will continue to follow and monitor vancomycin.    Please contact pharmacy at extension [phone] for questions regarding this assessment.    Thank you for the consult,   Jailene Pace       Patient brief summary:  Martina Betancourt is a 70 y.o. female initiated on antimicrobial therapy with IV Vancomycin for treatment of bone/joint infection    The patient's current regimen is 1000mg q24    Drug Allergies:   Review of patient's allergies indicates:   Allergen Reactions    Hydroxyzine hcl     Tizanidine        Actual Body Weight:   134kg    Renal Function:   Estimated Creatinine Clearance: 84.5 mL/min (based on SCr of 0.9 mg/dL).,     Dialysis Method (if applicable):  N/A    CBC (last 72 hours):  Recent Labs   Lab Result Units 11/02/20  0310   WBC K/uL 8.87   Hemoglobin g/dL 9.6*   Hematocrit % 32.7*   Platelets K/uL 371*   Gran % % 64.8   Lymph % % 21.2   Mono % % 10.3   Eosinophil % % 2.9   Basophil % % 0.3   Differential Method  Automated       Metabolic Panel (last 72 hours):  Recent Labs   Lab Result Units 10/31/20  2224 11/02/20  0310   Sodium mmol/L 138 140   Potassium mmol/L 4.0 3.7   Chloride mmol/L 97 100   CO2 mmol/L 30* 30*   Glucose mg/dL 132* 91   BUN mg/dL 12 10   Creatinine mg/dL 1.0 0.9   Magnesium mg/dL  --  1.8   Phosphorus mg/dL  --  3.8       Vancomycin Administrations:  vancomycin given in the last 96 hours                      vancomycin in dextrose 5 % 1 gram/250 mL IVPB 1,000 mg (mg) 1,000 mg New Bag 11/03/20 0027     1,000 mg New Bag 11/01/20 2333     1,000 mg New Bag  0030     1,000 mg New Bag 10/30/20 2345                    Microbiologic Results:  Microbiology Results (last 7 days)       Procedure Component Value Units Date/Time    Stool culture [[id number]] Collected: 10/26/20 1827    Order Status: Completed Specimen: Stool Updated: 10/30/20 1442     Stool Culture No Salmonella,Shigella,Vibrio,Campylobacter,Yersinia isolated.    E. coli 0157 antigen [[id number]] Collected: 10/26/20 1827    Order Status: Completed Specimen: Stool Updated: 10/28/20 1213     Shiga Toxin 1 E.coli Negative     Shiga Toxin 2 E.coli Negative           not applicable

## 2023-06-28 NOTE — PLAN OF CARE
Patient resting. Vancomycin continued q 24 hours through PICC line. Purple port of PICC line occluded. Red port flushed with blood return noted. Patient with limited activity today. Turned patient q2 hours and heels elevated on pillows    none

## 2023-09-09 NOTE — PLAN OF CARE
Re-eval and POC set 10/18/20    Problem: Occupational Therapy Goal  Goal: Occupational Therapy Goal  Description: Updated Goals to be met by: 10/25/2020     Patient will increase functional independence with ADLs by performing:    Feeding with Minimal Assistance seated EOB.  UE Dressing with Minimal Assistance seated EOB.  Grooming while EOB with Minimal Assistance.  Rolling to Bilateral with Moderate Assistance.   Supine to sit with Moderate Assistance.  Stand pivot to BSC with Moderate Assistance.      Goals to be met by: 10/16/2020     Patient will increase functional independence with ADLs by performing:    Feeding with Minimal Assistance seated EOB.  UE Dressing with Minimal Assistance seated EOB.  Grooming while EOB with Minimal Assistance.  Bathing from edge of bed with wipes with Minimal Assistance.  Rolling to Bilateral with Moderate Assistance.   Supine to sit with Moderate Assistance.    Outcome: Ongoing, Progressing      Spontaneous, unlabored and symmetrical

## 2023-10-04 ENCOUNTER — PATIENT MESSAGE (OUTPATIENT)
Dept: ADMINISTRATIVE | Facility: OTHER | Age: 73
End: 2023-10-04
Payer: MEDICARE

## 2024-02-27 NOTE — PROGRESS NOTES
"PSYCHIATRY  PROGRESS NOTE     DATE OF SERVICE   02/26/24          CHIEF COMPLAINT   \"There are some things and hear that did not happen.\"       SUBJECTIVE   Nursing reports:      He is talkative and plays card, board games with others in the shift.  He denies problematic anxiety, depression, pain/discomfort, and cold, flu like symptoms when asked.  States that he is feeling better, \" due to being around happier people,\" and stated that when he's around a lot of negative, aggressive people that it affects him, causes him distress.  He rambles in conversations and said that, \" people are attracted to talk to me, everything I walk in the park by Jackie Bermudez, people would just come up to me and wanting to talk to me.\"  Informed writer that his can cause him problems due to saying that he doesn't want to talk to everyone all the time.  He is wearing his left knee brace in the shift, yet states its loose and needs a new one.  Informed writer he was fitted with knee brace before losing a significant amount of weight.  He displays some disorganized thought process by difficulty expressing himself and said, \" I don't know what I'm taking,\" and education provided on his scheduled evening medications. States that he is sleeping better and agrees to come to staff for concerns.              Patient slept 7 hours overnight per nursing documentation.     reports:  -Attending Provider: CARISSA Davis CNP  -Voicemail Code: [id number]  -Team Note Due: Tuesday  -Next Steps:     Coordinate with [facility] regarding petition for commitment.  Coordinate with [facility] regarding belongings           Assessment/Intervention/Current Symtoms and Care Coordination:  Chart review and met with team, discussed pt progress, symptomology, and response to treatment.  Discussed the discharge plan and any potential impediments to discharge.  [facility] called and spoke to Higinio's CADI  Norah through the [facility] " [facility]  Infectious Disease  Progress Note    Patient Name: Martina Betancourt  MRN: [id number]  Admission Date: 12/1/2020  Length of Stay: 7 days  Attending Physician: Everardo Gongora MD  Primary Care Provider: Joe Fuller Iii, MD    Isolation Status: No active isolations  Assessment/Plan:      * Osteomyelitis of thoracic vertebra  70 year old female with hx of MRSA bacteremia, known T9-10 osteo discitis, T8-10 epidural phlegmon with associated paraverterbral abscesses (NSGY consulted at that time - did not recommend acute surgical intervention) whom has been on 6-8 weeks of IV Vancomycin now admitted with worsening erosive changes of vertebral bodes, pedicular fractures and persistent intervertebral disc space abscess and epidural phlegmon.     Vancomycin discontinued and Ceftaroline started. Blood cx are negative. She is afebrile. No leukocytosis. NSGY is planning for I&D, T9-T10 corpectomy and fusion today    Plan:  1. Continue ceftaroline  2. When taken to the OR, please send bone/tissue for path, gram stain, aerobic, anaerobic, AFB and fungal cultures   3. Anticipate 6 weeks of IV antibiotics   4. ID will follow.        Please call for any questions. Thank you.  Krystina Mendoza PA-C  Phone: [phone]  Pager: [phone]    Subjective:     Principal Problem:Osteomyelitis of thoracic vertebra    HPI: 69 yo female with a history of MRSA bactermia and dementia known to ID service and under the treatment for presumed MRSA osteodiscitis at T9-10.  She had been seen in Oct 2020 after having been admitted to and OSH and found to have MRSA bacteremia that was treated as inpt and dc'd on Zyvox to complete 7 d.  She then presented with excruciating back pain and found to have osteodiscitis at T9-10 and BL ST abscesses.  Blood cultures were no growth at that time and [facility] underwent aspiration at T9-10 and it was no growth.  She was discharged on Vancomycin to complete 6-8 weeks EOC 12/3/20 and she has been  "[facility] [place]. [facility] was informed that Higinio has been accepted to [facility] and he would be able to discharge to this place when the time comes.   [facility] received confirmation on court dates regarding commitment hearing.  [facility] met with Higinio to discuss discharge plans and commitment hearings. [facility] updated Higinio of commitment hearing dates. [facility] discussed [facility] and [facility] and CADI services through the [facility]. Higinio expressed a willingness to continue to move forward with [facility]. Higinio expressed wanting to learn more about [facility] due to their program locations being closer to his mother and brother.         Discharge Plan or Goal:  Pending further stabilization, continued compliance with medications, continued activities of daily living, and development of a safe discharge plan.   Considerations:      Barriers to Discharge:  Impact of mental health symptoms on well being   Impact of mental health symptoms on activities of daily living  Need for medication monitoring and medication management     Referral Status:        Legal Status:  Court Hold   County: [place]  File Number: [id number]  Start and expiration date of commitment:      Contacts:  Norah (CADI  through [facility] [place]): [phone]  [email or web address]     Upcoming Meetings and Dates/Important Information:        -Still Needed on AVS:             OBJECTIVE   Met with patient in OT room of unit [address]. patient is observed to be reviewing court documents which pertain to petition for mental health commitment. Upon patient interview, patient reports, \" There are some things and hear that did not happen.\"  Patient denies some of the behavior that which was reported when he was at [facility].  He also endorses that when he was at [facility] he experienced some bullying from other clients.  Patient's affect appears blunted and guarded however improving.  " at [facility]  She had followed up in ID clinic on  and back pain was improving. Repeat MRI  was done and showed:  1. Altered signal intensity changes at T9/T10 with evidence of erosive focus about the opposing endplates and evidence of enhancing phlegmonous formation identified in the central component of the disc with central low signal intensity compatible with osteomyelitis/discitis.  Vertical dimensions of the area of interest cause extreme erosion along the anterior portion of the vertebral bodies of interest without evidence of any significant paraspinal component.  2. No significant soft tissue component.    NSGY fu was arranged.      FU CT scan was planned and done on  showin.Findings compatible with T9-10 discitis/osteomyelitis with surrounding perivertebral soft tissue thickening.  Evaluation for underlying abscess an epidural process limited given the lack of intravenous contrast.  If there is concern for underlying epidural process, consider further evaluation with an MRI of the thoracic spine.  No retropulsion is noted.  2. Interval development of fracture deformity through the bilateral T9 pedicles and posterior superior cortical margin of the T9 vertebral body    MRI C/T/L done showin. Findings in keeping with known T9-T10 and osteomyelitis/discitis with associated intervertebral disc space abscess, epidural phlegmon with associated mass effect on the thoracic spinal cord and paravertebral phlegmon as discussed above.  Findings do not appear significantly improved when compared to most recent exam of 2020.   2. Subtle T2/STIR signal hyperintensity involving the T3-T4 intervertebral disc space, increased in conspicuity from prior examination. This could reflect an additional region of discitis/osteomyelitis and attention on follow-up exam is advised.   3. No evidence to suggest osteomyelitis/discitis in the cervical or lumbar spine at this time.  Multilevel  "Patient's mood appears slightly anxious.  Patient's speech is slightly rapid.  Patient's thought process is circumstantial, tangential, difficult to follow, perseverative, and disorganized.  Patient  continues to endorse delusional thoughts that he is affected by other's emotions and that his presence has an impact on other people's thoughts and emotions.  Patient also reports that he is experiencing paranoid delusional thoughts   Patient also endorses preoccupations and guilt as he reports belief that his presence causes bad things to happen to other people.  Patient also reports that if he, \"Does anything bad then I am guilty and not supposed to be here.\"  Patient denies any HI.  Denies any thoughts of SI, SIB, SA, intent or plan. Patient able to contract for safety. Patient denies auditory hallucinations or visual hallucinations today however endorses that he has experienced this in the past.  Patient reports that he is able to manage his mental health symptoms by suppressing these thoughts.  Patient denies depression or anxiety today.  Patient endorses sleeping well overnight and reports that he benefits from being in a room by himself.  Patient reports appetite is slightly improving.  Patient has previously reported delusional thought that when he is eating food he believes he is \"eating himself.\"  Today, he reports these thoughts are improving and, \"Now more metaphorical and not so literal.\"  Patient does report that he notices the water available on the unit appears \"cloudy\" and that his preference would be bottled water.  Provider subsequently updated diet order to reflect this.  Patient has previously reported constipation symptoms.  Today patient reports he did have a bowel movement last night which was of large size. Patient does report that at baseline he experiences constipation.  Patient denies any issues with urination.  Patient vital signs reviewed and noted to be stable. Patient denies any other " degenerative change of the cervical and lumbar spine.   4. Moderate size hiatal hernia and left pleural effusion.     She was admitted and surgery planned.  Her back pain has worsened over the last 2-3 weeks but she denies systemic sing of infection.  Blood cultures are NGTD and crp 12.4.  She remains on vanc and denies PICC problems.  The patient denies any recent fever, chills, or sweats.      Interval History: No AEON.  WBC WNL.  BCXS NGTD.  Surgery planned for this afternoon. The patient denies any recent fever, chills, or sweats. Back pain unchanged/stable.      Review of Systems   Constitutional: Negative for activity change, chills, diaphoresis and fever.   Respiratory: Negative for cough, shortness of breath and wheezing.    Cardiovascular: Negative for chest pain.   Gastrointestinal: Negative for abdominal pain, constipation, diarrhea, nausea and vomiting.   Genitourinary: Negative for dysuria, frequency and urgency.   Musculoskeletal: Positive for arthralgias and back pain.   Neurological: Negative for dizziness.   Hematological: Does not bruise/bleed easily.     Objective:     Vital Signs (Most Recent):  Temp: 97.2 °F (36.2 °C) (12/08/20 1314)  Pulse: 63 (12/08/20 1345)  Resp: 20 (12/08/20 1345)  BP: 130/76 (12/08/20 1345)  SpO2: 98 % (12/08/20 1345) Vital Signs (24h Range):  Temp:  [97.2 °F (36.2 °C)-98.1 °F (36.7 °C)] 97.2 °F (36.2 °C)  Pulse:  [58-72] 63  Resp:  [14-22] 20  SpO2:  [96 %-100 %] 98 %  BP: (103-138)/(58-77) 130/76     Weight: 125.4 kg (276 lb 7.3 oz)  Body mass index is 39.67 kg/m².    Estimated Creatinine Clearance: 73.3 mL/min (based on SCr of 1 mg/dL).    Physical Exam  Constitutional:       General: She is not in acute distress.     Appearance: She is well-developed. She is not diaphoretic.   HENT:      Head: Normocephalic and atraumatic.   Cardiovascular:      Rate and Rhythm: Normal rate and regular rhythm.      Heart sounds: Normal heart sounds. No murmur. No friction rub. No  "medical concerns today. Patient has no other questions or concerns.  Patient denies any side effects of current medication regimen.  Today, plan will be to continue olanzapine ODT tablet 15 mg PO at bedtime to target psychosis and mood disorder symptoms.   Patient has been compliant with psychiatric medications in the hospital however continues to report that he has not needed psychiatric medications in the community due to his ability to \"control my thoughts.\"  Patient reports awareness that he is currently on a court hold awaiting details of upcoming court hearings.  Patient offers no other questions or concerns.         MEDICATIONS   Medications:  Scheduled Meds:    cholecalciferol  1,250 mcg Oral Q7 Days     multivitamin, therapeutic  1 tablet Oral Daily     OLANZapine zydis  15 mg Oral At Bedtime     polyethylene glycol  17 g Oral Daily     senna-docusate  1-2 tablet Oral BID     Continuous Infusions:  PRN Meds:.acetaminophen, alum & mag hydroxide-simethicone, hydrOXYzine HCl, melatonin, nicotine, OLANZapine **OR** OLANZapine, polyethylene glycol    Medication adherence issues: MS Med Adherence Y/N: Yes, patient reports history of noncompliance with psychiatric medications in the community.  Patient is cooperative with meds in the hospital currently  Medication side effects: MEDICATION SIDE EFFECTS: Patient reports constipation  Benefit: Yes / No: Yes       ROS   Pertinent items are noted in HPI.       MENTAL STATUS EXAM   Vitals: /80 (BP Location: Right arm)   Pulse 97   Temp 98.9  F (37.2  C) (Oral)   Resp 16   Ht 1.93 m (6' 3.98\")   Wt 102.2 kg (225 lb 5 oz)   SpO2 96%   BMI 27.44 kg/m      Appearance:  Disheveled  Mood: Reports, \"good\"  Affect: blunted and guarded  was congruent to speech  Suicidal Ideation: PRESENT / ABSENT: absent   Homicidal Ideation: PRESENT / ABSENT: absent   Thought process: circumstantial, tangential, difficult to follow, perseverative, and disorganized   Thought " gallop.    Pulmonary:      Effort: Pulmonary effort is normal. No respiratory distress.      Breath sounds: Normal breath sounds. No wheezing or rales.   Abdominal:      General: There is no distension.      Palpations: Abdomen is soft.      Tenderness: There is no abdominal tenderness.   Genitourinary:     Comments: corrina  Skin:     General: Skin is warm and dry.      Comments: PICC c/d/i   Neurological:      Mental Status: She is alert and oriented to person, place, and time.   Psychiatric:         Behavior: Behavior normal.         Significant Labs:   Blood Culture:   Recent Labs   Lab 09/23/20  1433 09/26/20  1452 10/08/20  2344 10/08/20  2345 12/02/20  0020   LABBLOO Gram stain dale bottle: Gram positive cocci in clusters resembling Staph   Results called to and read back by:Parag Galvan RN 09/24/2020  11:30  Gram stain aer bottle: Gram positive cocci in clusters resembling Staph   Positive results previously called 09/24/2020  13:51  METHICILLIN RESISTANT STAPHYLOCOCCUS AUREUS  ID consult required at [facility] and [facility].  For susceptibility see order #[id number]  * No growth after 5 days. No growth after 5 days. No growth after 5 days. No growth after 5 days.     CBC:   No results for input(s): WBC, HGB, HCT, PLT in the last 48 hours.  CMP:   No results for input(s): NA, K, CL, CO2, GLU, BUN, CREATININE, CALCIUM, PROT, ALBUMIN, BILITOT, ALKPHOS, AST, ALT, ANIONGAP, EGFRNONAA in the last 48 hours.    Invalid input(s): ESTGFAFRICA  Wound Culture:   Recent Labs   Lab 10/16/20  1228   LABAERO No growth     All pertinent labs within the past 24 hours have been reviewed.    Significant Imaging: I have reviewed all pertinent imaging results/findings within the past 24 hours.   CT Cervical Spine Without Contrast [[id number]] Resulted: 12/04/20 0626   Order Status: Completed Updated: 12/04/20 0628   Narrative:     EXAMINATION:   CT CERVICAL SPINE WITHOUT CONTRAST     CLINICAL HISTORY:    evaluate for fracture; prevertebral edema at C5-6 on MRI;     TECHNIQUE:   Low dose axial images, sagittal and coronal reformations were performed though the cervical spine.  Contrast was not administered.     COMPARISON:   MRI cervical, thoracic and lumbar spine 12/02/2020, cervical spine MRI 06/01/2015     FINDINGS:   Cervical vertebral body alignment appears to be within normal limits.  Vertebral body heights appear maintained and similar to prior MRI of 2015.  No definite CT evidence to suggest acute cervical spine fracture. The facet joints articulate appropriately. No significant prevertebral soft tissue swelling noting the cervical esophagus is slightly thickened. There is multilevel intervertebral disc height loss and extensive multilevel degenerative change of the visualized cervical spine primarily consisting of multiple posterior disc osteophyte complexes, uncovertebral joint DJD and facet arthropathy.  C4-C5 and C5-C6 levels where there are varying degrees of moderate/severe neural foraminal narrowing bilaterally.  The visualized skull base is intact.  The visualized lung apices demonstrate bilateral pleural fluid.    Impression:       1. No CT evidence to suggest acute cervical spine fracture.  Clinical correlation and further evaluation as warranted.   2. Multilevel degenerative change of the cervical spine most pronounced at the C4-C5 and C5-C6 levels.       Electronically signed by: Christy cMgee MD   Date: 12/04/2020   Time: 06:26   MRI SPINE CERVICAL-THORACIC-LUMBAR W W/O CONTRAST (XPD) [[id number]] (Abnormal) Resulted: 12/02/20 0626   Order Status: Completed Updated: 12/02/20 0628   Narrative:     EXAMINATION:   MRI SPINE CERVICAL-THORACIC-LUMBAR W W/O CONTRAST (XPD)     CLINICAL HISTORY:   worsening osteo;     TECHNIQUE:   Multiplanar, multisequence MR images of the cervical, thoracic and lumbar spine were performed before and after the administration of 10 cc gadolinium based IV contrast.  content: significant for delusions, preoccupations, and paranoid ideation.   Fund of Knowledge: Average  Attention/Concentration: Poor  Language ability:  Intact, speech is slightly rapid  Memory: Intact  Insight:  limited.  Judgement: limited  Orientation: Yes, x4  Psychomotor Behavior: fidgety    Muscle Strength and Tone: MuscleStrength: Normal  Gait and Station: Normal       LABS   personally reviewed.     Vitamin D level noted to be low at 10 ng/mL; supplement ordered 2/22/24 to address vitamin D deficiency.     Latest Reference Range & Units 02/17/24 13:58 02/17/24 15:16 02/18/24 11:07 02/18/24 14:37 02/19/24 02:49 02/20/24 07:44   Sodium 135 - 145 mmol/L  140 143      Potassium 3.4 - 5.3 mmol/L  3.6 3.8      Chloride 98 - 107 mmol/L  104 108 (H)      Carbon Dioxide (CO2) 22 - 29 mmol/L  26 28      Urea Nitrogen 6.0 - 20.0 mg/dL  6.2 8.7      Creatinine 0.67 - 1.17 mg/dL  0.88 0.94      GFR Estimate >60 mL/min/1.73m2  >90 >90      Calcium 8.6 - 10.0 mg/dL  9.4 9.2      Anion Gap 7 - 15 mmol/L  10 7      Magnesium 1.7 - 2.3 mg/dL  1.9       Albumin 3.5 - 5.2 g/dL  4.4 4.1      Protein Total 6.4 - 8.3 g/dL  7.0 6.5      Alkaline Phosphatase 40 - 150 U/L  49 44      ALT 0 - 70 U/L  6 7      AST 0 - 45 U/L  11 13      Bilirubin Total <=1.2 mg/dL  1.3 (H) 1.2      Cholesterol <200 mg/dL      135   Glucose 70 - 99 mg/dL  104 (H) 135 (H)      HDL Cholesterol >=40 mg/dL      36 (L)   Hemoglobin A1C <5.7 %      4.8   LDL Cholesterol Calculated <=100 mg/dL      84   Non HDL Cholesterol <130 mg/dL      99   Triglycerides <150 mg/dL      75   TSH 0.30 - 4.20 uIU/mL      2.08   Vitamin D, Total (25-Hydroxy) 20 - 50 ng/mL      10 (L)   WBC 4.0 - 11.0 10e3/uL  5.4       Hemoglobin 13.3 - 17.7 g/dL  16.9       Hematocrit 40.0 - 53.0 %  46.5       Platelet Count 150 - 450 10e3/uL  232       RBC Count 4.40 - 5.90 10e6/uL  5.49       MCV 78 - 100 fL  85       MCH 26.5 - 33.0 pg  30.8       MCHC 31.5 - 36.5 g/dL  36.3       RDW      COMPARISON:   *CT thoracic spine 11/30/2020 10/13/2020   *MRI thoracic spine 11/09/2020, 10/12/2020   *CT lumbar spine 10/13/2020   *MRI lumbar spine 10/12/2020     FINDINGS:   Cervical spine:     Please note image quality is degraded by patient motion artifact, most notably sagittal postcontrast images.  Cervical vertebral bodies demonstrate no evidence to suggest acute fracture or abnormal infiltrating marrow replacement process.  There is multilevel intervertebral disc desiccation and disc height loss most pronounced at the C5-C6 level.  The craniocervical junction is within normal limits.  The cervical spinal cord is normal in caliber and signal intensity.  There is multilevel degenerative change of the cervical spine consisting of uncovertebral joint DJD, posterior disc osteophyte complexes and facet arthropathy.  Degenerative change most pronounced at the C5-C6 level where there is effacement of the anterior thecal sac and moderate/severe bilateral neural foraminal narrowing (right greater than left).  Following the administration of IV contrast, no pathologic foci of enhancement are appreciated.  Incidentally visualized soft tissue structures demonstrate a presumed sebaceous cyst in the right posterior extra thoracic soft tissues.     Thoracic:     There is redemonstration of abnormal marrow signal intensity and postcontrast enhancement involving the T9-T10 level consistent with patient's known osteomyelitis/discitis.  There is abnormal fluid signal and peripheral enhancement involving the T9-T10 intervertebral disc space concerning for associated disc space abscess.  There is abnormal signal intensity and postcontrast enhancement involving the anterior and posterior epidural spaces from the T8 through T10 level in keeping with associated epidural phlegmon.  There is associated mass effect on the thoracic spinal cord without definite abnormal intramedullary cord signal intensity.  Phlegmonous change  "10.0 - 15.0 %  11.9       % Neutrophils %  78       % Lymphocytes %  13       % Monocytes %  7       % Eosinophils %  1       % Basophils %  1       Absolute Basophils 0.0 - 0.2 10e3/uL  0.0       Absolute Eosinophils 0.0 - 0.7 10e3/uL  0.1       Absolute Immature Granulocytes <=0.4 10e3/uL  0.0       Absolute Lymphocytes 0.8 - 5.3 10e3/uL  0.7 (L)       Absolute Monocytes 0.0 - 1.3 10e3/uL  0.4       % Immature Granulocytes %  0       Absolute Neutrophils 1.6 - 8.3 10e3/uL  4.2       Absolute NRBCs 10e3/uL  0.0       NRBCs per 100 WBC <1 /100  0       SARS CoV2 PCR Negative      Negative    Amphetamine Qual Urine Screen Negative  Screen Negative        Fentanyl Qual Urine Screen Negative  Screen Negative        Cocaine Urine Screen Negative  Screen Negative        Benzodiazepine Urine Screen Negative  Screen Negative        Opiates Qualitative Urine Screen Negative  Screen Negative        PCP Urine Screen Negative  Screen Negative        Cannabinoids Qual Urine Screen Negative  Screen Positive !        Barbiturates Qual Urine Screen Negative  Screen Negative        EKG 12-LEAD, TRACING ONLY     Rpt     (H): Data is abnormally high  (L): Data is abnormally low  !: Data is abnormal  Rpt: View report in Results Review for more information  No results found for: \"PHENYTOIN\", \"PHENOBARB\", \"VALPROATE\", \"CBMZ\"       DIAGNOSIS   Principal Problem:    Schizoaffective disorder, bipolar type, currently hypomanic  History of schizophrenia  PTSD  R/O OCD    History of generalized anxiety disorder with panic attacks  History of ADHD    Clinically Significant Risk Factors                         # Overweight: Estimated body mass index is 27.44 kg/m  as calculated from the following:    Height as of this encounter: 1.93 m (6' 3.98\").    Weight as of this encounter: 102.2 kg (225 lb 5 oz).   # Moderate Malnutrition: based on nutrition assessment          Active Problem List:  Patient Active Problem List   Diagnosis     Irritable " appears to involve the T8-T9 and T9-T10 neural foramina.  There is associated paravertebral phlegmonous change also at these levels.  There is subtle T2/STIR signal hyperintensity involving the T3-T4 intervertebral disc space noting this appears increased in conspicuity from prior examination.  This could reflect an additional region of discitis/osteomyelitis and attention on follow-up exam is advised.  Thoracic vertebral body alignment is stable and there is redemonstration of multilevel degenerative change.  Incidentally visualized intrathoracic structures demonstrate a moderate-sized hiatal hernia and left pleural fluid.     Lumbar spine:     There is grade 2 anterolisthesis of L5 on S1 secondary to bilateral L5 pars defects, unchanged from prior examination.  There is grade 1 anterolisthesis of L3 on L4, also unchanged.  Lumbar vertebral bodies demonstrate no evidence of acute fracture or infiltrating marrow replacement process.  There is multilevel intervertebral disc desiccation.  The conus medullaris is normal in morphology and terminates at the T12-L1 level.  Following the administration of IV contrast, no pathologic foci of enhancement are appreciated.  There is multilevel degenerative change of the lumbar spine similar to most recent MRI of 10/12/2020.  There is a subcentimeter right renal cortical T2 hyperintensity, likely a cyst.  There is fatty atrophy of the paraspinal musculature.    Impression:       1. Findings in keeping with known T9-T10 and osteomyelitis/discitis with associated intervertebral disc space abscess, epidural phlegmon with associated mass effect on the thoracic spinal cord and paravertebral phlegmon as discussed above.  Findings do not appear significantly improved when compared to most recent exam of 11/09/2020.   2. Subtle T2/STIR signal hyperintensity involving the T3-T4 intervertebral disc space, increased in conspicuity from prior examination. This could reflect an additional  bowel syndrome     Class 2 severe obesity due to excess calories with serious comorbidity and body mass index (BMI) of 37.0 to 37.9 in adult (H)     Patellofemoral instability of left knee with pain     Tobacco abuse     Suicidal ideation     Moderate episode of recurrent major depressive disorder (H)     CARSON (obstructive sleep apnea)     Unspecified psychosis not due to a substance or known physiological condition (H)     Marijuana use     Schizophrenia, unspecified type (H)          HOSPITAL COURSE AND ASSESSMENT   This is a 29 year old male with history of schizophrenia, MDD, suicidal ideation, and PTSD who presented to [facility] ED on 2/17/2024 via EMS for psychiatric evaluation.  Patient was at [facility] and was experiencing symptoms of psychosis including disorganized, paranoid, and delusional thoughts.  Patient also exhibiting poor p.o. intake and not attending to ADLs such as bathing.  Patient has a history of Patellofemoral instability of left knee and requires a brace to stabilize.  Per chart review patient was not taking any psychiatric medications prior to admission.  Patient endorsed some cannabis use and urine drug screening negative for all except cannabinoids.  While admitted to the ED psychiatry provider was consulted and initiated olanzapine to target psychosis symptoms.  Patient was also started on Anafranil to target OCD symptoms.  Patient was evaluated by medical provider in the ED and determined to be medically stable.  Patient subsequently admitted to inpatient psychiatry unit 10 [address] for further psychiatric stabilization.  Current legal status is 72-hour hold.  Olanzapine started in the ED to target psychosis symptoms and plan will be to titrate to therapeutic dose while monitoring for side effects.  Psychiatric provider initiated petition for mental health commitment with Mg on 2/20/2024.  Trial of Anafranil initiated in the ED to target OCD symptoms  however this was discontinued due to risk for worsening psychosis symptoms.        PLAN   1. Ongoing education given regarding diagnostic and treatment options with risks, benefits and alternatives and adequate verbalization of understanding.  2.  Medications:  Continue multivitamin 1 tab PO daily  Continue cholecalciferol (vitamin D3) capsule 1250 mcg PO every 7 days for 8 doses to target vitamin D deficiency, administer immediately after supper  Continue olanzapine ODT tablet 15 mg PO at bedtime to target psychosis symptoms and mood disorder symptoms  PRN hydroxyzine tablet 25 mg PO every 4 hours as needed for anxiety  PRN olanzapine tablet 10 mg PO every 3 hours as needed for agitation, ordered linked with olanzapine IM injection  PRN melatonin tablet 3 mg PO at bedtime as needed for sleep  PRN nicotine lozenge 2 mg buccal every 1 hour as needed for nicotine withdrawal symptoms    3.  Labs/Imaging:  -N/A    4. Consults:  -Hospitalist to follow as needed    5. Precautions:  -Self-injury precautions, suicide precautions; risk moderate    6. Legal:  - Court hold with [facility]    7. Discharge planning:  -Per unit CTC        Risk Assessment: [facility] RISK ASSESSMENT: Patient able to contract for safety and Patient on precautions    Coordination of Care:   Treatment Plan reviewed and physician signed, Care discussed with Care/Treatment Team Members, Chart reviewed and Patient seen      Re-Certification I certify that the inpatient psychiatric facility services furnished since the previous certification were, and continue to be, medically necessary for, either, treatment which could reasonably be expected to improve the patient s condition or diagnostic study and that the hospital records indicate that the services furnished were, either, intensive treatment services, admission and related services necessary for diagnostic study, or equivalent services.     I certify that the patient continues to need, on a daily  region of discitis/osteomyelitis and attention on follow-up exam is advised.   3. No evidence to suggest osteomyelitis/discitis in the cervical or lumbar spine at this time.  Multilevel degenerative change of the cervical and lumbar spine.   4. Moderate size hiatal hernia and left pleural effusion.   This report was flagged in Epic as abnormal.     Electronically signed by resident: Leif Glovre   Date: 12/02/2020   Time: 04:58     Electronically signed by: Christy Mcgee MD   Date: 12/02/2020   Time: 06:26   Imaging History    2020  Date Procedure Name Status Accession Number Location   12/03/20 03:16 PM CT Cervical Spine Without Contrast Final [id number] [facility]   12/02/20 04:03 AM MRI SPINE CERVICAL-THORACIC-LUMBAR W W/O CONTRAST (XPD) Final [id number] [facility]   11/30/20 02:10 PM CT Thoracic Spine Without Contrast Final [id number] [facility]   11/09/20 12:08 PM MRI Thoracic Spine W WO Cont Final [id number] [facility]          basis, active treatment furnished directly by or requiring the supervision of inpatient psychiatric facility personnel.   I estimate 14-21 days of hospitalization is necessary for proper treatment of the patient. My plans for post-hospital care for this patient are   TBD     CARISSA Mendieta CNP    -     02/26/24       -     12:20 PM       Total time  35 minutes with > 50%spent on coordination of cares and psycho-education.    This note was created with help of Dragon dictation system. Grammatical / typing errors are not intentional.    CARISSA Mendieta CNP

## 2024-08-13 NOTE — PLAN OF CARE
Following up w/fletcher need for home o2 eval.  Not completed on 09/30/3030.  Will evaluate today.  Informed Caity, patient was being discharged today and needed documentation as soon as possible to submit order for home o2.  Caity did go into patient's room for eval.  DUNG also contacted Jenifer and spoke to Denisa.  Informed her of possible need for oxygen and hold delivery of bed until it is determined if patient will require oxygen so that we can have everything delivered at once.   None

## 2024-09-26 NOTE — PROGRESS NOTES
Pharmacokinetic Assessment Follow Up: IV Vancomycin    Vancomycin serum concentration assessment(s):    The trough level was drawn correctly and can be used to guide therapy at this time. The measurement is within the desired definitive target range of 15 to 20 mcg/mL.    Vancomycin Regimen Plan:    Continue regimen to Vancomycin 750 mg IV every 18 hours with next serum trough concentration measured at 11/19/20 prior to 3 dose on 1300    Drug levels (last 3 results):  Recent Labs   Lab Result Units 11/15/20  0135 11/16/20  1258 11/18/20  0100   Vancomycin-Trough ug/mL 15.9 16.8 18.0       Pharmacy will continue to follow and monitor vancomycin.    Please contact pharmacy at extension [phone] for questions regarding this assessment.    Thank you for the consult,   Jailene Pace       Patient brief summary:  Martina Betancourt is a 70 y.o. female initiated on antimicrobial therapy with IV Vancomycin for treatment of bone/joint infection    The patient's current regimen is 750mg q18    Drug Allergies:   Review of patient's allergies indicates:   Allergen Reactions    Hydroxyzine hcl     Tizanidine        Actual Body Weight:   128kg    Renal Function:   Estimated Creatinine Clearance: 74.3 mL/min (based on SCr of 1 mg/dL).,     Dialysis Method (if applicable):  N/A    CBC (last 72 hours):  Recent Labs   Lab Result Units 11/16/20  0538   WBC K/uL 8.83   Hemoglobin g/dL 10.9*   Hematocrit % 37.1   Platelets K/uL 344   Gran % % 74.0*   Lymph % % 13.0*   Mono % % 8.5   Eosinophil % % 4.0   Basophil % % 0.3   Differential Method  Automated       Metabolic Panel (last 72 hours):  Recent Labs   Lab Result Units 11/15/20  0629 11/16/20  0538 11/17/20  0622   Sodium mmol/L 138 137 138   Potassium mmol/L 3.7 4.0 4.3   Chloride mmol/L 100 98 98   CO2 mmol/L 26 31* 29   Glucose mg/dL 93 96 95   BUN mg/dL 12 15 13   Creatinine mg/dL 1.0 1.1 1.0   Magnesium mg/dL  --  2.5  --    Phosphorus mg/dL  --  3.2  --        Vancomycin  [FreeTextEntry1] : 78 yo F PMH HTN c/b seizures/HTNive emergency, HLD, CAD s/p PCI 2000, and SDH presenting for evaluation of sleep apnea.  1) Sleep apnea - She reports symptoms primarily of snoring and daytime fatigue without witnessed apneas. ESS today is 7 though likely underestimated  She underwent watchPAT HST with evidence of severe KASSI comprised primarily of obstructive events with significant degree of mixed and central apneas (overall 4% AHI 75.2/hr, m/cAHI 25.1/hr). However, study also confounded by severely reduced sleep efficiency (41%) and total sleep time of 2h 55mins. T90 during the study was 1.3%. She has been counseled on the health risks associated with KASSI including HTN, cardiovascular disease, arrhythmia, and stroke. Potential therapeutic options have been discussed. Given the concern for false positive HST will refer for confirmatory testing with in-center polysomnogram.   2) Irregular sleep wake cycle - reports inconsistent sleep times since hospitalization in June with difficulty falling asleep, staying asleep, and daytime naps in the afternoon. The importance of sleep hygiene, stimulus control, avoiding prolonged time in bed, and regularizing their sleep schedule was discussed. She is recommended to maintain a consistent bedtime with supplemental melatonin administered at 7pm and consistent AM bright light therapy upon awakening in the morning.   3) Shortness of breath - She reports intermittent shortness of breath since hospitalization that is present at rest and exertion. Prescribed albuterol though only took 1-3 times since prescription without significant benefit. During hospital course was noted to have intermittent stridor with ENT evaluation and possible SGS that was not seen on follow up CT. Concern for possible mass effect 2/2 brachiocephalic artery. Recommended for ENT follow up post-hospitalization. She will be referred for PFTs for further evaluation of lung function/airway obstruction   Follow up after PSG Administrations:  vancomycin given in the last 96 hours                     vancomycin 750 mg in dextrose 5 % 250 mL IVPB (ready to mix system) (mg) 750 mg New Bag 11/17/20 0913     750 mg New Bag 11/16/20 1407     750 mg New Bag 11/15/20 1913     750 mg New Bag  0216     750 mg New Bag 11/14/20 0752                    Microbiologic Results:  Microbiology Results (last 7 days)       ** No results found for the last 168 hours. **

## 2024-10-21 ENCOUNTER — PATIENT MESSAGE (OUTPATIENT)
Dept: FAMILY MEDICINE | Facility: CLINIC | Age: 74
End: 2024-10-21
Payer: OTHER GOVERNMENT

## (undated) DEVICE — SUT VICRYL+ 1 CT1 18IN

## (undated) DEVICE — CHLORAPREP W TINT 26ML APPL

## (undated) DEVICE — DRAPE ABDOMINAL TIBURON 14X11

## (undated) DEVICE — TUBE FRAZIER 5MM 2FT SOFT TIP

## (undated) DEVICE — Device

## (undated) DEVICE — SEALER AQUAMANTYS 2.3 BIPOLAR

## (undated) DEVICE — KIT SPINAL PATIENT CARE JACK

## (undated) DEVICE — TRAY FOLEY 16FR INFECTION CONT

## (undated) DEVICE — ROUTER TAPERED 2.3MM

## (undated) DEVICE — DRESSING AQUACEL FOAM 3 X 3

## (undated) DEVICE — SPHERE MARKER REFLECTIVE DISP

## (undated) DEVICE — DRESSING AQUACEL SACRAL 9 X 9

## (undated) DEVICE — TAP 5MM SPINAL POWER

## (undated) DEVICE — BUR BONE CUT MICRO TPS 3X3.8MM

## (undated) DEVICE — IMPLANTABLE DEVICE
Type: IMPLANTABLE DEVICE | Site: SPINE THORACIC | Status: NON-FUNCTIONAL
Removed: 2020-12-08

## (undated) DEVICE — SUT CTD VICRYL 2-0 CR/CT-2

## (undated) DEVICE — DRAPE INCISE IOBAN 2 23X17IN

## (undated) DEVICE — CANISTER INFOV.A.C WOUND 500ML

## (undated) DEVICE — SUT SILK 3-0 BLK BR SH 30IN

## (undated) DEVICE — SEE MEDLINE ITEM 157150

## (undated) DEVICE — DRESSING SURGICAL 1/2X1/2

## (undated) DEVICE — KIT EVACUATOR 3-SPRING 1/8 DRN

## (undated) DEVICE — HEMOSTAT SURGICEL 4X8IN

## (undated) DEVICE — DRESSING ANTIMICROBIAL 1 INCH

## (undated) DEVICE — SEE MEDLINE ITEM 156905

## (undated) DEVICE — CORD BIPOLAR 12 FOOT

## (undated) DEVICE — DRAPE C-ARMOR EQUIPMENT COVER

## (undated) DEVICE — BURR ROUND PRECISION 7.5MM

## (undated) DEVICE — BLADE 4IN EDGE INSULATED

## (undated) DEVICE — NDL SPINAL 18GX3.5 SPINOCAN

## (undated) DEVICE — SEE MEDLINE ITEM 146347

## (undated) DEVICE — KIT SURGIFLO HEMOSTATIC MATRIX

## (undated) DEVICE — DRAPE STERI INSTRUMENT 1018

## (undated) DEVICE — MARKER SKIN STND TIP BLUE BARR

## (undated) DEVICE — KIT PREVENA PLUS

## (undated) DEVICE — COVER BACK TABLE 72X21

## (undated) DEVICE — SPONGE LAP 4X18 PREWASHED

## (undated) DEVICE — SPONGE GAUZE 16PLY 4X4

## (undated) DEVICE — GAUZE SPONGE 4X4 12PLY

## (undated) DEVICE — FRAZIER 18FR

## (undated) DEVICE — KIT CONFIDENCE W/O NEEDLES

## (undated) DEVICE — DRAPE C ARM 42 X 120 10/BX

## (undated) DEVICE — SEE MEDLINE ITEM 157131

## (undated) DEVICE — ELECTRODE REM PLYHSV RETURN 9

## (undated) DEVICE — STAPLER SKIN PROXIMATE WIDE

## (undated) DEVICE — DRAPE STERI-DRAPE 1000 17X11IN

## (undated) DEVICE — SUT STRATAFIX PDS PLUS VIO

## (undated) DEVICE — DRESSING AQUACEL FOAM 5 X 5

## (undated) DEVICE — DRESSING TRANS 2X2 TEGADERM

## (undated) DEVICE — CAUTERY BOVIE PENCIL